# Patient Record
Sex: MALE | Race: WHITE | Employment: UNEMPLOYED | URBAN - METROPOLITAN AREA
[De-identification: names, ages, dates, MRNs, and addresses within clinical notes are randomized per-mention and may not be internally consistent; named-entity substitution may affect disease eponyms.]

---

## 2017-01-04 ENCOUNTER — TELEPHONE (OUTPATIENT)
Dept: NEUROLOGY | Age: 37
End: 2017-01-04

## 2017-01-04 DIAGNOSIS — G44.86 CERVICOGENIC HEADACHE: ICD-10-CM

## 2017-01-04 DIAGNOSIS — G43.709 CHRONIC MIGRAINE WITHOUT AURA WITHOUT STATUS MIGRAINOSUS, NOT INTRACTABLE: ICD-10-CM

## 2017-01-04 RX ORDER — BUTALBITAL, ACETAMINOPHEN AND CAFFEINE 50; 325; 40 MG/1; MG/1; MG/1
TABLET ORAL
Qty: 20 TAB | Refills: 2 | Status: SHIPPED | OUTPATIENT
Start: 2017-01-04 | End: 2017-06-01 | Stop reason: SDUPTHER

## 2017-01-04 NOTE — TELEPHONE ENCOUNTER
Dr. Justin Claros  rec'd request from [id number] [place] [address] [address] for a refill on Fioricet -40 mg table.  Please advise

## 2017-01-11 RX ORDER — SERTRALINE HYDROCHLORIDE 100 MG/1
100 TABLET, FILM COATED ORAL DAILY
Qty: 90 TAB | Refills: 3 | Status: SHIPPED | OUTPATIENT
Start: 2017-01-11 | End: 2018-01-30 | Stop reason: SDUPTHER

## 2017-01-11 NOTE — TELEPHONE ENCOUNTER
Chief Complaint   Patient presents with    Medication Refill     Last refill:  2 years ago (5/31/2014)        sertraline (ZOLOFT) 100 mg tablet       Take 100 mg by mouth daily.       Dispense: Not specified     By: Andrzej Dang MD

## 2017-01-11 NOTE — TELEPHONE ENCOUNTER
Mother of patient calling in refill for patient.  Patient can be reached at [phone]. [phone]

## 2017-02-12 ENCOUNTER — APPOINTMENT (OUTPATIENT)
Dept: GENERAL RADIOLOGY | Age: 37
End: 2017-02-12
Attending: PHYSICIAN ASSISTANT
Payer: COMMERCIAL

## 2017-02-12 ENCOUNTER — HOSPITAL ENCOUNTER (EMERGENCY)
Age: 37
Discharge: HOME | End: 2017-02-12
Attending: EMERGENCY MEDICINE
Payer: COMMERCIAL

## 2017-02-12 VITALS
TEMPERATURE: 97.9 F | DIASTOLIC BLOOD PRESSURE: 94 MMHG | RESPIRATION RATE: 16 BRPM | OXYGEN SATURATION: 98 % | BODY MASS INDEX: 37.45 KG/M2 | WEIGHT: 252.87 LBS | HEIGHT: 69 IN | HEART RATE: 104 BPM | SYSTOLIC BLOOD PRESSURE: 171 MMHG

## 2017-02-12 DIAGNOSIS — M79.601 ARM PAIN, ANTERIOR, RIGHT: Primary | ICD-10-CM

## 2017-02-12 DIAGNOSIS — M54.12 CERVICAL RADICULOPATHY: ICD-10-CM

## 2017-02-12 DIAGNOSIS — M50.30 DDD (DEGENERATIVE DISC DISEASE), CERVICAL: ICD-10-CM

## 2017-02-12 PROCEDURE — 74011250637 HC RX REV CODE- 250/637: Performed by: PHYSICIAN ASSISTANT

## 2017-02-12 PROCEDURE — 99283 EMERGENCY DEPT VISIT LOW MDM: CPT

## 2017-02-12 PROCEDURE — 73030 X-RAY EXAM OF SHOULDER: CPT

## 2017-02-12 RX ORDER — HYDROCODONE BITARTRATE AND ACETAMINOPHEN 5; 325 MG/1; MG/1
1 TABLET ORAL
Qty: 10 TAB | Refills: 0 | Status: SHIPPED | OUTPATIENT
Start: 2017-02-12 | End: 2017-03-08

## 2017-02-12 RX ORDER — HYDROCODONE BITARTRATE AND ACETAMINOPHEN 5; 325 MG/1; MG/1
1 TABLET ORAL
Status: COMPLETED | OUTPATIENT
Start: 2017-02-12 | End: 2017-02-12

## 2017-02-12 RX ORDER — DIAZEPAM 5 MG/1
5 TABLET ORAL
Status: COMPLETED | OUTPATIENT
Start: 2017-02-12 | End: 2017-02-12

## 2017-02-12 RX ORDER — DIAZEPAM 5 MG/1
5 TABLET ORAL
Qty: 10 TAB | Refills: 0 | Status: SHIPPED | OUTPATIENT
Start: 2017-02-12 | End: 2017-03-08

## 2017-02-12 RX ADMIN — DIAZEPAM 5 MG: 5 TABLET ORAL at 12:40

## 2017-02-12 RX ADMIN — HYDROCODONE BITARTRATE AND ACETAMINOPHEN 1 TABLET: 5; 325 TABLET ORAL at 12:39

## 2017-02-12 NOTE — LETTER
[address] 
[facility] EMERGENCY DEPT 
[address] [place] [address] [address] [id number] [phone] Work/School Note Date: 2/12/2017 To Whom It May concern: 
 
Jose Ly was seen and treated today in the emergency room by the following provider(s): 
Attending Provider: Tristan Herrera MD 
Physician Assistant: LOLLY Vargas. Jose Ly No work 48 hours. Sincerely, LOLLY Vargas

## 2017-02-12 NOTE — ED PROVIDER NOTES
HPI Comments: Francisca Mullins is a 39 y.o. male with pmhx significant for HTN, DM who presents ambulatory to the ED c/o sharp constant  right shoulder pain radiating into his RUE. Pt states movement exacerbates the pain. Per pt, he reports damage to his right rotator cuff as a child, but notes no follow-up or repair. He also notes numbness in the right hand. He specifically denies any neck pain or loss of bladder or stool control. PCP: Karie Spence MD    There are no other complaints, changes, or physical findings at this time. The history is provided by the patient. Past Medical History:   Diagnosis Date    Chronic headaches 2007    Depression     Diabetes ([address]     Hypertension     Thyroid disease      hypothyroid    Unspecified sleep apnea      does not use CPAP       Past Surgical History:   Procedure Laterality Date    Hx heent       wisdom teeth removed    Hx cholecystectomy  8/26/14     Dr Tonny Marrero    Hx orthopaedic       carpel tunnel         Family History:   Problem Relation Age of Onset    Diabetes Mother     Heart Disease Father     Cancer Father     Diabetes Father     Diabetes Paternal Aunt        Social History     Social History    Marital status:      Spouse name: N/A    Number of children: N/A    Years of education: N/A     Occupational History    Not on file. Social History Main Topics    Smoking status: Former Smoker     Packs/day: 0.00     Years: 14.00     Quit date: 1/1/2014    Smokeless tobacco: Never Used    Alcohol use No    Drug use: No    Sexual activity: Not on file     Other Topics Concern    Not on file     Social History Narrative         ALLERGIES: Demerol [meperidine]; Penicillin g; Percocet [oxycodone-acetaminophen]; and Sulfa (sulfonamide antibiotics)    Review of Systems   Constitutional: Negative for activity change, appetite change, chills and fever.    HENT: Negative for congestion, rhinorrhea, sinus pressure, sneezing and sore throat. Eyes: Negative for pain, discharge and visual disturbance. Respiratory: Negative for cough and shortness of breath. Cardiovascular: Negative for chest pain. Gastrointestinal: Negative for abdominal pain, diarrhea, nausea and vomiting. Genitourinary: Negative for dysuria, flank pain, frequency and urgency. Musculoskeletal: Positive for arthralgias (right shoulder). Negative for back pain, gait problem, joint swelling, myalgias and neck pain. Skin: Negative for color change and rash. Neurological: Positive for numbness (right hand). Negative for dizziness, speech difficulty, weakness, light-headedness and headaches. Psychiatric/Behavioral: Negative for agitation, behavioral problems and confusion. All other systems reviewed and are negative. Vitals:    02/12/17 1201   BP: (!) 171/94   Pulse: (!) 104   Resp: 16   Temp: 97.9 °F (36.6 °C)   SpO2: 98%   Weight: 114.7 kg (252 lb 13.9 oz)   Height: 5' 9\" (1.753 m)            Physical Exam   Constitutional: He is oriented to person, place, and time. He appears well-developed and well-nourished. No distress. HENT:   Head: Normocephalic and atraumatic. Right Ear: External ear normal.   Left Ear: External ear normal.   Nose: Nose normal.   Mouth/Throat: Oropharynx is clear and moist. No oropharyngeal exudate. Eyes: Conjunctivae and EOM are normal. Pupils are equal, round, and reactive to light. Right eye exhibits no discharge. Left eye exhibits no discharge. No scleral icterus. Neck: Normal range of motion. Neck supple. No JVD present. No tracheal deviation present. Cardiovascular: Normal rate, regular rhythm, normal heart sounds and intact distal pulses. Exam reveals no gallop and no friction rub. No murmur heard. Pulmonary/Chest: Effort normal and breath sounds normal. No respiratory distress. He has no wheezes. He has no rales. He exhibits no tenderness. Abdominal: Soft.  Bowel sounds are normal. He exhibits no distension and no mass. There is no tenderness. There is no rebound and no guarding. Musculoskeletal: Normal range of motion. He exhibits no edema or tenderness. Right trapezius, upper extremity, shoulder:  Tender  Decreased active passive  Neurovascularly intact   Lymphadenopathy:     He has no cervical adenopathy. Neurological: He is alert and oriented to person, place, and time. He has normal reflexes. No cranial nerve deficit. He exhibits normal muscle tone. Coordination normal.   Skin: Skin is warm and dry. He is not diaphoretic. Psychiatric: He has a normal mood and affect. His behavior is normal. Judgment and thought content normal.   Nursing note and vitals reviewed. MDM  Number of Diagnoses or Management Options  Diagnosis management comments: DDx: DDD, DJD, sciatica       Amount and/or Complexity of Data Reviewed  Tests in the radiology section of CPT®: ordered and reviewed  Review and summarize past medical records: yes    Patient Progress  Patient progress: stable    ED Course       Procedures     Progress Note  12:45 PM  Patient had a negative lumbar spine xray in 2015. Written by Graciela Hurd ED Scribe, as dictated by Vickey Herbert. LABORATORY TESTS:  No results found for this or any previous visit (from the past 12 hour(s)). IMAGING RESULTS:  XR SHOULDER RT AP/LAT MIN 2 V   Final Result          EXAM: XR SHOULDER RT AP/LAT MIN 2 V     INDICATION: Trauma. Right shoulder pain     COMPARISON: 9/24/2012.     FINDINGS: Three views of the right shoulder demonstrate no acute fracture or  dislocation.     IMPRESSION  IMPRESSION: No acute fracture. MEDICATIONS GIVEN:  Medications   HYDROcodone-acetaminophen (NORCO) 5-325 mg per tablet 1 Tab (1 Tab Oral Given 2/12/17 1239)   diazePAM (VALIUM) tablet 5 mg (5 mg Oral Given 2/12/17 1240)       IMPRESSION:  No diagnosis found. PLAN:  1.    Current Discharge Medication List      CONTINUE these medications which have NOT CHANGED Details   sertraline (ZOLOFT) 100 mg tablet Take 1 Tab by mouth daily. Qty: 90 Tab, Refills: 3      butalbital-acetaminophen-caffeine (FIORICET, ESGIC) -40 mg per tablet Take 1 tab at the onset of headache. Can take twice a day but not more than 10 days/month  Indications: only to be filled in one month intervals  Qty: 20 Tab, Refills: 2    Associated Diagnoses: Cervicogenic headache; Chronic migraine without aura without status migrainosus, not intractable      !! busPIRone (BUSPAR) 15 mg tablet Take 1 Tab by mouth two (2) times a day. Qty: 60 Tab, Refills: 3      insulin NPH (NOVOLIN N, HUMULIN N) 100 unit/mL injection 100 units twice daily  Qty: 1 Vial, Refills: 12    Associated Diagnoses: Uncontrolled type 1 diabetes mellitus without complication (HCC)      insulin regular (NOVOLIN R, HUMULIN R) 100 unit/mL injection 15-20 units TID AC  Qty: 1 Vial, Refills: 12    Associated Diagnoses: Uncontrolled type 1 diabetes mellitus without complication (HCC)      levothyroxine (SYNTHROID) 112 mcg tablet Take 1 Tab by mouth Daily (before breakfast). Qty: 90 Tab, Refills: 3    Associated Diagnoses: Acquired hypothyroidism      lisinopril (PRINIVIL, ZESTRIL) 10 mg tablet Take 1 Tab by mouth daily. Qty: 90 Tab, Refills: 3    Associated Diagnoses: Essential hypertension      topiramate (TOPAMAX) 25 mg tablet Wk 1 25 mg po qhs, wk 2 25 mg po bid, wk 3 25 mg am 50 mg qhs, wk 4 50 mg bid  Qty: 120 Tab, Refills: 2    Associated Diagnoses: Cervicogenic headache; Chronic migraine without aura without status migrainosus, not intractable      DULoxetine (CYMBALTA) 30 mg capsule Take 30 mg by mouth daily. diclofenac EC (VOLTAREN) 75 mg EC tablet Take 75 mg by mouth two (2) times a day. cyclobenzaprine (FLEXERIL) 5 mg tablet Take 10 mg by mouth three (3) times daily as needed for Muscle Spasm(s). !! busPIRone (BUSPAR) 5 mg tablet Take 5 mg by mouth.         HYDROcodone-acetaminophen (NORCO) 5-325 mg per tablet TK 1 T PO  Q 4 H PRN P  Refills: 0      gemfibrozil (LOPID) 600 mg tablet Take 1 Tab by mouth two (2) times a day. Qty: 180 Tab, Refills: 3    Associated Diagnoses: Hypertriglyceridemia       !! - Potential duplicate medications found. Please discuss with provider. 2.   Follow-up Information     None        3. Return to ED if worse     DISCHARGE NOTE  1:11 PM  The patient has been re-evaluated and is ready for discharge. Reviewed available results with patient. Counseled patient on diagnosis and care plan. Patient has expressed understanding, and all questions have been answered. Patient agrees with plan and agrees to follow up as recommended, or return to the ED if their symptoms worsen. Discharge instructions have been provided and explained to the patient, along with reasons to return to the ED. This note is prepared by Priya Mckinney, acting as Scribe for [employer]. MARGARITA Benson: The the scribe's documentation has been prepared under my direction and personally reviewed by me in its entirety. I confirm that the note above accurately reflects all work, treatment, procedures, and medical decision making performed by me.

## 2017-03-08 ENCOUNTER — HOSPITAL ENCOUNTER (EMERGENCY)
Age: 37
Discharge: HOME | End: 2017-03-08
Attending: EMERGENCY MEDICINE
Payer: COMMERCIAL

## 2017-03-08 VITALS
OXYGEN SATURATION: 98 % | HEIGHT: 69 IN | DIASTOLIC BLOOD PRESSURE: 86 MMHG | TEMPERATURE: 98.1 F | SYSTOLIC BLOOD PRESSURE: 131 MMHG | WEIGHT: 251.1 LBS | BODY MASS INDEX: 37.19 KG/M2 | HEART RATE: 95 BPM | RESPIRATION RATE: 18 BRPM

## 2017-03-08 DIAGNOSIS — G89.29 CHRONIC RIGHT SHOULDER PAIN: Primary | ICD-10-CM

## 2017-03-08 DIAGNOSIS — M43.6 ACUTE TORTICOLLIS: ICD-10-CM

## 2017-03-08 DIAGNOSIS — M25.511 CHRONIC RIGHT SHOULDER PAIN: Primary | ICD-10-CM

## 2017-03-08 PROCEDURE — 74011250637 HC RX REV CODE- 250/637: Performed by: PHYSICIAN ASSISTANT

## 2017-03-08 PROCEDURE — 99283 EMERGENCY DEPT VISIT LOW MDM: CPT

## 2017-03-08 RX ORDER — KETOROLAC TROMETHAMINE 10 MG/1
TABLET, FILM COATED ORAL
COMMUNITY
Start: 2017-02-16 | End: 2017-06-07

## 2017-03-08 RX ORDER — CYCLOBENZAPRINE HCL 10 MG
10 TABLET ORAL
Status: COMPLETED | OUTPATIENT
Start: 2017-03-08 | End: 2017-03-08

## 2017-03-08 RX ORDER — INSULIN LISPRO 100 [IU]/ML
INJECTION, SOLUTION INTRAVENOUS; SUBCUTANEOUS
COMMUNITY
Start: 2017-01-03 | End: 2018-03-20

## 2017-03-08 RX ORDER — HYDROCODONE BITARTRATE AND ACETAMINOPHEN 5; 325 MG/1; MG/1
1 TABLET ORAL
Qty: 20 TAB | Refills: 0 | Status: SHIPPED | OUTPATIENT
Start: 2017-03-08 | End: 2017-06-07

## 2017-03-08 RX ORDER — DULOXETIN HYDROCHLORIDE 60 MG/1
CAPSULE, DELAYED RELEASE ORAL
COMMUNITY
Start: 2017-03-06 | End: 2017-06-07

## 2017-03-08 RX ORDER — HYDROCODONE BITARTRATE AND ACETAMINOPHEN 5; 325 MG/1; MG/1
1 TABLET ORAL
Status: COMPLETED | OUTPATIENT
Start: 2017-03-08 | End: 2017-03-08

## 2017-03-08 RX ORDER — TRAMADOL HYDROCHLORIDE 50 MG/1
TABLET ORAL
COMMUNITY
Start: 2017-02-20 | End: 2017-03-08 | Stop reason: ALTCHOICE

## 2017-03-08 RX ORDER — CYCLOBENZAPRINE HCL 5 MG
5-10 TABLET ORAL
Qty: 20 TAB | Refills: 0 | Status: SHIPPED | OUTPATIENT
Start: 2017-03-08 | End: 2017-06-07

## 2017-03-08 RX ADMIN — CYCLOBENZAPRINE HYDROCHLORIDE 10 MG: 10 TABLET, FILM COATED ORAL at 21:42

## 2017-03-08 RX ADMIN — HYDROCODONE BITARTRATE AND ACETAMINOPHEN 1 TABLET: 5; 325 TABLET ORAL at 21:42

## 2017-03-09 NOTE — ED PROVIDER NOTES
HPI Comments: Norma Rocah is a 44 y.o. male with a history of hypertension, diabetes, hypothyroidism, depression, and cholecystectomy who presents ambulatory to [facility] ED with a chief complaint of neck and right shoulder pain for the past few weeks secondary to a known bulging disc and damaged rotator cuff for which patient is followed by orthopedics. Patient states he is currently on Tramadol, but reports it is not managing his symptoms. Patient reports he is followed by Dr. Damian Hurtado for his neck, and has an appointment with Dr. Blas Garcia for his shoulder on 3/26/17. Patient denies currently undergoing physical therapy. Patient denies any new injuries. Patient denies use of any antiinflammatories for his symptoms. Patient denies use of any tobacco products, alcohol, or illicit drugs. Patient denies any fevers or any other symptoms at this time. PCP: Naga Jordan MD  Orthopedics: Dr. Damian Hurtado, Dr. Blas Garcia    There are no other complaints, changes, or physical findings at this time. The history is provided by the patient. Past Medical History:   Diagnosis Date    Chronic headaches 2007    Depression     [address]     Hypertension     Thyroid disease     hypothyroid    Unspecified sleep apnea     does not use CPAP       Past Surgical History:   Procedure Laterality Date    HX CHOLECYSTECTOMY  8/26/14    Dr Leatha Peter    HX HEENT      wisdom teeth removed    HX ORTHOPAEDIC      carpel tunnel         Family History:   Problem Relation Age of Onset    Diabetes Mother     Heart Disease Father     Cancer Father     Diabetes Father     Diabetes Paternal Aunt        Social History     Social History    Marital status:      Spouse name: N/A    Number of children: N/A    Years of education: N/A     Occupational History    Not on file.      Social History Main Topics    Smoking status: Former Smoker     Packs/day: 0.00     Years: 14.00     Quit date: 1/1/2014    Smokeless tobacco: Never Used  Alcohol use No    Drug use: No    Sexual activity: Not on file     Other Topics Concern    Not on file     Social History Narrative     ALLERGIES: Demerol [meperidine]; Penicillin g; Percocet [oxycodone-acetaminophen]; and Sulfa (sulfonamide antibiotics)    Review of Systems   Constitutional: Negative. Negative for activity change, appetite change, chills and fever. HENT: Negative. Negative for congestion, rhinorrhea, sinus pressure, sneezing and sore throat. Eyes: Negative. Negative for pain, discharge and visual disturbance. Respiratory: Negative. Negative for cough and shortness of breath. Cardiovascular: Negative. Negative for chest pain. Gastrointestinal: Negative. Negative for abdominal pain, diarrhea, nausea and vomiting. Endocrine: Negative. Genitourinary: Negative. Negative for dysuria, flank pain, frequency and urgency. Musculoskeletal: Positive for arthralgias (right shoulder) and neck pain. Negative for back pain, gait problem, joint swelling and myalgias. Skin: Negative. Negative for color change and rash. Allergic/Immunologic: Negative. Neurological: Negative. Negative for dizziness, speech difficulty, weakness, light-headedness, numbness and headaches. Hematological: Negative. Psychiatric/Behavioral: Negative. Negative for agitation, behavioral problems and confusion. All other systems reviewed and are negative. Patient Vitals for the past 12 hrs:   Temp Pulse Resp BP SpO2   03/08/17 1932 98.1 °F (36.7 °C) (!) 102 18 131/86 98 %      Physical Exam   Constitutional: He is oriented to person, place, and time. He appears well-developed and well-nourished. No distress. Ambulatory   HENT:   Head: Normocephalic and atraumatic. Right Ear: External ear normal.   Left Ear: External ear normal.   Nose: Nose normal.   Mouth/Throat: Oropharynx is clear and moist. No oropharyngeal exudate.    Eyes: Conjunctivae and EOM are normal. Pupils are equal, round, and reactive to light. Right eye exhibits no discharge. Left eye exhibits no discharge. No scleral icterus. Neck: Normal range of motion. Neck supple. No tracheal deviation present. No contusion or deformity   Cardiovascular: Normal rate, regular rhythm, normal heart sounds and intact distal pulses. Exam reveals no gallop and no friction rub. No murmur heard. Pulmonary/Chest: Effort normal and breath sounds normal. No respiratory distress. He has no wheezes. He has no rales. He exhibits no tenderness. Abdominal: Soft. Bowel sounds are normal. He exhibits no distension and no mass. There is no tenderness. There is no rebound and no guarding. Musculoskeletal: He exhibits no edema. Tenderness to right rotator cuff, no contusion or deformity  Tightness to the right trapezius   Lymphadenopathy:     He has no cervical adenopathy. Neurological: He is alert and oriented to person, place, and time. No cranial nerve deficit. Coordination normal.   Neurovascularly intact to the right upper extremity   Skin: Skin is warm and dry. No rash noted. No erythema. Psychiatric: He has a normal mood and affect. His behavior is normal. Judgment and thought content normal.   Nursing note and vitals reviewed. MDM  Number of Diagnoses or Management Options  Diagnosis management comments: DDx: Shoulder pain, torticollis, DDD       Amount and/or Complexity of Data Reviewed  Review and summarize past medical records: yes    Patient Progress  Patient progress: stable      Procedures       MEDICATIONS GIVEN:  Medications   HYDROcodone-acetaminophen (NORCO) 5-325 mg per tablet 1 Tab (1 Tab Oral Given 3/8/17 2142)   cyclobenzaprine (FLEXERIL) tablet 10 mg (10 mg Oral Given 3/8/17 2142)       IMPRESSION:  1. Chronic right shoulder pain    2. Acute torticollis        PLAN:  1.    Current Discharge Medication List      START taking these medications    Details   HYDROcodone-acetaminophen (NORCO) 5-325 mg per tablet Take 1 Tab by mouth every six (6) hours as needed for Pain. Max Daily Amount: 4 Tabs. Qty: 20 Tab, Refills: 0         CONTINUE these medications which have CHANGED    Details   cyclobenzaprine (FLEXERIL) 5 mg tablet Take 1-2 Tabs by mouth three (3) times daily (with meals). Qty: 20 Tab, Refills: 0         CONTINUE these medications which have NOT CHANGED    Details   sertraline (ZOLOFT) 100 mg tablet Take 1 Tab by mouth daily. Qty: 90 Tab, Refills: 3      butalbital-acetaminophen-caffeine (FIORICET, ESGIC) -40 mg per tablet Take 1 tab at the onset of headache. Can take twice a day but not more than 10 days/month  Indications: only to be filled in one month intervals  Qty: 20 Tab, Refills: 2    Associated Diagnoses: Cervicogenic headache; Chronic migraine without aura without status migrainosus, not intractable      !! busPIRone (BUSPAR) 15 mg tablet Take 1 Tab by mouth two (2) times a day. Qty: 60 Tab, Refills: 3      insulin NPH (NOVOLIN N, HUMULIN N) 100 unit/mL injection 100 units twice daily  Qty: 1 Vial, Refills: 12    Associated Diagnoses: Uncontrolled type 1 diabetes mellitus without complication (HCC)      insulin regular (NOVOLIN R, HUMULIN R) 100 unit/mL injection 15-20 units TID AC  Qty: 1 Vial, Refills: 12    Associated Diagnoses: Uncontrolled type 1 diabetes mellitus without complication (HCC)      gemfibrozil (LOPID) 600 mg tablet Take 1 Tab by mouth two (2) times a day. Qty: 180 Tab, Refills: 3    Associated Diagnoses: Hypertriglyceridemia      levothyroxine (SYNTHROID) 112 mcg tablet Take 1 Tab by mouth Daily (before breakfast). Qty: 90 Tab, Refills: 3    Associated Diagnoses: Acquired hypothyroidism      lisinopril (PRINIVIL, ZESTRIL) 10 mg tablet Take 1 Tab by mouth daily.   Qty: 90 Tab, Refills: 3    Associated Diagnoses: Essential hypertension      topiramate (TOPAMAX) 25 mg tablet Wk 1 25 mg po qhs, wk 2 25 mg po bid, wk 3 25 mg am 50 mg qhs, wk 4 50 mg bid  Qty: 120 Tab, Refills: 2    Associated Diagnoses: Cervicogenic headache; Chronic migraine without aura without status migrainosus, not intractable      !! DULoxetine (CYMBALTA) 30 mg capsule Take 30 mg by mouth daily. diclofenac EC (VOLTAREN) 75 mg EC tablet Take 75 mg by mouth two (2) times a day. !! busPIRone (BUSPAR) 5 mg tablet Take 15 mg by mouth two (2) times a day.      ketorolac (TORADOL) 10 mg tablet       !! DULoxetine (CYMBALTA) 60 mg capsule       HUMALOG 100 unit/mL injection        !! - Potential duplicate medications found. Please discuss with provider. STOP taking these medications       traMADol (ULTRAM) 50 mg tablet Comments:   Reason for Stoppin.   Follow-up Information     Follow up With Details Comments [address] Pavan Florien, MD   [address] [facility] [address]  [address]  [address]  [address] [address]  [address] [place]   [address] [place] [address]  [phone]          Return to ED if worse     Discharge Note:  9:57 PM  The patient is ready for discharge. The patient's signs, symptoms, diagnosis, and discharge instructions have been discussed and the patient has conveyed their understanding. The patient is to follow up as recommended or return to the ER should their symptoms worsen. Plan has been discussed and the patient is in agreement. This note is prepared by Emmy Johnson, acting as Scribe for Hawk De León. MARGARITA Hernandez Search: The scribe's documentation has been prepared under my direction and personally reviewed by me in its entirety. I confirm that the note above accurately reflects all work, treatment, procedures, and medical decision making performed by me.

## 2017-03-09 NOTE — ED NOTES
Assumed care of pt from triage at this time. Pt arrives with c/o intermittent neck, R shoulder pain x  1 month. Pt was referred to Dr. Gaby Manzanares and had MRI of R neck-showed damaged rotator cuff and bulging disc between C5-C6. Pt with 8/10 posterior neck and R shoulder pain at this time. Pt resting comfortably on the stretcher in a position of comfort.  Pt in no acute distress at this time.  Call bell within reach.  Side rails x 2.   Stretcher locked in the lowest position.  Pt aware of plan to await for MD/PA-C/NP assessment, and pt/family verbalizes understanding.  Will continue to monitor neck, R shoulder pain.

## 2017-03-09 NOTE — ED NOTES
Discharge instructions given to patient by LOLLY Webster. Verbalized understanding of instructions. Patient discharged without difficulty. Patient discharged in stable condition via ambulation, pt denies wheelchair accompanied by family.

## 2017-04-20 ENCOUNTER — HOSPITAL ENCOUNTER (EMERGENCY)
Age: 37
Discharge: HOME | End: 2017-04-20
Attending: EMERGENCY MEDICINE
Payer: COMMERCIAL

## 2017-04-20 VITALS
HEIGHT: 69 IN | TEMPERATURE: 98.3 F | RESPIRATION RATE: 16 BRPM | WEIGHT: 254.19 LBS | DIASTOLIC BLOOD PRESSURE: 87 MMHG | SYSTOLIC BLOOD PRESSURE: 145 MMHG | OXYGEN SATURATION: 99 % | BODY MASS INDEX: 37.65 KG/M2 | HEART RATE: 104 BPM

## 2017-04-20 DIAGNOSIS — M25.511 CHRONIC RIGHT SHOULDER PAIN: Primary | ICD-10-CM

## 2017-04-20 DIAGNOSIS — G89.29 CHRONIC RIGHT SHOULDER PAIN: Primary | ICD-10-CM

## 2017-04-20 PROCEDURE — 99282 EMERGENCY DEPT VISIT SF MDM: CPT

## 2017-04-20 RX ORDER — NAPROXEN 500 MG/1
500 TABLET ORAL 2 TIMES DAILY WITH MEALS
Qty: 20 TAB | Refills: 0 | Status: SHIPPED | OUTPATIENT
Start: 2017-04-20 | End: 2017-04-30

## 2017-04-20 RX ORDER — HYDROCODONE BITARTRATE AND ACETAMINOPHEN 5; 325 MG/1; MG/1
1 TABLET ORAL
Qty: 15 TAB | Refills: 0 | Status: SHIPPED | OUTPATIENT
Start: 2017-04-20 | End: 2017-06-07

## 2017-04-20 RX ORDER — CYCLOBENZAPRINE HCL 10 MG
10 TABLET ORAL
Qty: 20 TAB | Refills: 0 | Status: SHIPPED | OUTPATIENT
Start: 2017-04-20 | End: 2017-06-07

## 2017-04-20 NOTE — ED NOTES
LOLLY Ridley provided patient with verbal and written discharge instructions. Patient discharged ambulatory.

## 2017-04-20 NOTE — DISCHARGE INSTRUCTIONS
Thank you for allowing us to provide you with care today. We hope we addressed all of your concerns and needs. We strive to provide excellent quality care in the Emergency Department. Please rate us as excellent, as anything less than excellent does not meet our expectations. If you feel that you have not received excellent quality care or timely care, please ask to speak to the nurse manager. Please choose us in the future for your continued health care needs. The exam and treatment you received in the Emergency Department were for an urgent problem and are not intended as complete care. It is important that you follow-up with a doctor, nurse practitioner, or  [id number] assistant to: (1) confirm your diagnosis, (2) re-evaluation of changes in your illness and treatment, and (3) for ongoing care. If your symptoms become worse or you do not improve as expected and you are unable to reach your usual health care provider, you should return to the Emergency Department. We are available 24 hours a day. Take this sheet with you when you go to your follow-up visit. If you have any problem arranging the follow-up visit, contact the Emergency Department immediately. Make an appointment with your Primary Care doctor for follow up of this visit. Return to the ER if you are unable to be seen in the time recommended on your discharge instructions.

## 2017-04-20 NOTE — ED PROVIDER NOTES
The history is provided by the patient. No  was used. Werner Pedraza is a 44 y.o. male who presents ambulatory  to [address] [facility] ED, with PMH of  HTN, DM, sleep apnea without C PAP, thyroid disease, chronic h/a, depression, and known hx right shoulder frozen shoulder and labrum tear and known hx C5C6 bulging disc, c/o 8/10 exacerbation of his right shoulder and neck pain x 3 days. Patient with long hx of right shoulder and neck pain, but for past x 3 days pain has been much more severe. Patient has decreased ROM of both neck and shoulder due to pain. Patient with ortho eval in past with hx epidural in neck that \"lasted only 4 days\" and hx shot in right shoulder \"that lasted only 4 days\". No improvement of his pain with previously prescribed tramadol by Ortho. Patient has appt with ortho on 5/1/17 and appt with ortho on 5/17/17. Patient denies specific known new injury/trauma/fall, fever/chills, URI s/s, cough, ABD pain, n/v, rashes/wounds or other specific c/o or concerns today. PCP: Toma Porter MD  Allergies: demerol, PCN, percocet, sulfa  Social Hx: former tobacco, no alcohol     There are no other complaints, changes or physical findings at this time. Past Medical History:   Diagnosis Date    Chronic headaches 2007    Depression     Diabetes ([facility] [address]     Hypertension     Thyroid disease     hypothyroid    Unspecified sleep apnea     does not use CPAP       Past Surgical History:   Procedure Laterality Date    HX CHOLECYSTECTOMY  8/26/14    Dr Kerwin Jacobs    HX HEENT      wisdom teeth removed    HX ORTHOPAEDIC      carpel tunnel         Family History:   Problem Relation Age of Onset    Diabetes Mother     Heart Disease Father     Cancer Father     Diabetes Father     Diabetes Paternal Aunt        Social History     Social History    Marital status:      Spouse name: N/A    Number of children: N/A    Years of education: N/A     Occupational History    Not on file. Social History Main Topics    Smoking status: Former Smoker     Packs/day: 0.00     Years: 14.00     Quit date: 1/1/2014    Smokeless tobacco: Never Used    Alcohol use No    Drug use: No    Sexual activity: Not on file     Other Topics Concern    Not on file     Social History Narrative         ALLERGIES: Demerol [meperidine]; Penicillin g; Percocet [oxycodone-acetaminophen]; and Sulfa (sulfonamide antibiotics)    Review of Systems   Constitutional: Negative for chills, fatigue and fever. HENT: Negative for congestion, ear pain, rhinorrhea and sore throat. Eyes: Negative for pain and redness. Respiratory: Negative for cough and wheezing. Cardiovascular: Negative for chest pain and palpitations. Gastrointestinal: Negative for abdominal pain, nausea and vomiting. Genitourinary: Negative for dysuria, frequency and urgency. Musculoskeletal: Negative for back pain and neck stiffness. Right shoulder pain   Skin: Negative for rash and wound. Neurological: Negative for dizziness, weakness, light-headedness, numbness and headaches. All other systems reviewed and are negative. Vitals:    04/20/17 1014   BP: 145/87   Pulse: (!) 104   Resp: 16   Temp: 98.3 °F (36.8 °C)   SpO2: 99%   Weight: 115.3 kg (254 lb 3.1 oz)   Height: 5' 9\" (1.753 m)            Physical Exam   Constitutional: He is oriented to person, place, and time. He appears well-developed and well-nourished. Non-toxic appearance. No distress. HENT:   Head: Normocephalic and atraumatic. Head is without right periorbital erythema and without left periorbital erythema. Right Ear: External ear normal.   Left Ear: External ear normal.   Nose: Nose normal.   Mouth/Throat: Uvula is midline. No trismus in the jaw. Eyes: Conjunctivae and EOM are normal. Pupils are equal, round, and reactive to light. No scleral icterus. Neck: Normal range of motion and full passive range of motion without pain.    Cardiovascular: Regular rhythm and normal heart sounds. Pulmonary/Chest: Effort normal and breath sounds normal. No accessory muscle usage. No tachypnea. No respiratory distress. He has no decreased breath sounds. He has no wheezes. Abdominal: Soft. There is no tenderness. There is no rigidity and no guarding. Musculoskeletal: Normal range of motion. Right shoulder: He exhibits tenderness. RIGHT SHOULDER:  Good symmetry  No bruising, redness or swelling  ROM limited secondary to pain  Diffuse tenderness   Neurological: He is alert and oriented to person, place, and time. He is not disoriented. No cranial nerve deficit or sensory deficit. GCS eye subscore is 4. GCS verbal subscore is 5. GCS motor subscore is 6. Skin: Skin is intact. No rash noted. Psychiatric: He has a normal mood and affect. His speech is normal.   Nursing note and vitals reviewed. MDM  Number of Diagnoses or Management Options  Chronic right shoulder pain:   Diagnosis management comments:      reviewed    Afebrile; well appearing; no new injury; presentation reflects a chronic, well documented complaint. Plan as below. Patient requests names of pain management MDs today while in ER. ED Course       Procedures     DISPOSITION:  11:27 AM    IMPRESSION:  1. Chronic right shoulder pain        PLAN:  1. Narcotic precautions reviewed with patient prior to discharge  2. Patient given pain management list handout at discharge  3. Take today's ER prescribed medications as directed  4. Follow up as instructed; keep your previously scheduled appts with ortho on 5/1/1/7 and 5/17/17. Discharge Medication List as of 4/20/2017 11:24 AM      START taking these medications    Details   !! HYDROcodone-acetaminophen (NORCO) 5-325 mg per tablet Take 1 Tab by mouth every four (4) hours as needed for Pain.  Max Daily Amount: 6 Tabs., Print, Disp-15 Tab, R-0      naproxen (NAPROSYN) 500 mg tablet Take 1 Tab by mouth two (2) times daily (with meals) for 10 days. , Print, Disp-20 Tab, R-0      !! cyclobenzaprine (FLEXERIL) 10 mg tablet Take 1 Tab by mouth three (3) times daily as needed for Muscle Spasm(s). , Print, Disp-20 Tab, R-0       !! - Potential duplicate medications found. Please discuss with provider. CONTINUE these medications which have NOT CHANGED    Details   ketorolac (TORADOL) 10 mg tablet Historical Med      !! DULoxetine (CYMBALTA) 60 mg capsule Historical Med      HUMALOG 100 unit/mL injection Historical Med, SANDRA      !! cyclobenzaprine (FLEXERIL) 5 mg tablet Take 1-2 Tabs by mouth three (3) times daily (with meals). , Normal, Disp-20 Tab, R-0      !! HYDROcodone-acetaminophen (NORCO) 5-325 mg per tablet Take 1 Tab by mouth every six (6) hours as needed for Pain. Max Daily Amount: 4 Tabs., Print, Disp-20 Tab, R-0      sertraline (ZOLOFT) 100 mg tablet Take 1 Tab by mouth daily. , Normal, Disp-90 Tab, R-3      butalbital-acetaminophen-caffeine (FIORICET, ESGIC) -40 mg per tablet Take 1 tab at the onset of headache. Can take twice a day but not more than 10 days/month  Indications: only to be filled in one month intervals, Print, Disp-20 Tab, R-2      !! busPIRone (BUSPAR) 15 mg tablet Take 1 Tab by mouth two (2) times a day., Normal, Disp-60 Tab, R-3      insulin NPH (NOVOLIN N, HUMULIN N) 100 unit/mL injection 100 units twice daily, No Print, Disp-1 Vial, R-12      insulin regular (NOVOLIN R, HUMULIN R) 100 unit/mL injection 15-20 units TID AC, No Print, Disp-1 Vial, R-12      gemfibrozil (LOPID) 600 mg tablet Take 1 Tab by mouth two (2) times a day., Normal, Disp-180 Tab, R-3      levothyroxine (SYNTHROID) 112 mcg tablet Take 1 Tab by mouth Daily (before breakfast). , Normal, Disp-90 Tab, R-3      lisinopril (PRINIVIL, ZESTRIL) 10 mg tablet Take 1 Tab by mouth daily. , Normal, Disp-90 Tab, R-3      topiramate (TOPAMAX) 25 mg tablet Wk 1 25 mg po qhs, wk 2 25 mg po bid, wk 3 25 mg am 50 mg qhs, wk 4 50 mg bid, Normal, Disp-120 Tab, R-2 !! DULoxetine (CYMBALTA) 30 mg capsule Take 30 mg by mouth daily. , Historical Med      diclofenac EC (VOLTAREN) 75 mg EC tablet Take 75 mg by mouth two (2) times a day., Historical Med      !! busPIRone (BUSPAR) 5 mg tablet Take 15 mg by mouth two (2) times a day., Historical Med       !! - Potential duplicate medications found. Please discuss with provider. Follow-up Information     Follow up With Details Comments Contact Info    Oksana Montejo MD Schedule an appointment as soon as possible for a visit PRIMARY CARE: call to schedule follow up [address]  [address]      Floyd Orta MD Schedule an appointment as soon as possible for a visit ORTHO: as needed if symptoms persist [address]  [phone]          Return to ED if worse     This note is prepared by Laura Matthew, acting as Scribe for MARGARITA Clifford PA-C : The scribe's documentation has been prepared under my direction and personally reviewed by me in its entirety. I confirm that the note above accurately reflects all work, treatment, procedures, and medical decision making performed by me.

## 2017-04-28 ENCOUNTER — DOCUMENTATION ONLY (OUTPATIENT)
Dept: NEUROLOGY | Age: 37
End: 2017-04-28

## 2017-06-01 DIAGNOSIS — G44.86 CERVICOGENIC HEADACHE: ICD-10-CM

## 2017-06-01 DIAGNOSIS — G43.709 CHRONIC MIGRAINE WITHOUT AURA WITHOUT STATUS MIGRAINOSUS, NOT INTRACTABLE: ICD-10-CM

## 2017-06-01 RX ORDER — BUTALBITAL, ACETAMINOPHEN AND CAFFEINE 50; 325; 40 MG/1; MG/1; MG/1
TABLET ORAL
Qty: 20 TAB | Refills: 0 | Status: SHIPPED | OUTPATIENT
Start: 2017-06-01 | End: 2017-06-07

## 2017-06-01 NOTE — TELEPHONE ENCOUNTER
No future appointments. Last Appointment My Department:  10/31/2016    Please advise of refill below. Requested Prescriptions     Pending Prescriptions Disp Refills    butalbital-acetaminophen-caffeine (FIORICET, ESGIC) -40 mg per tablet 20 Tab 0     Sig: Take 1 tab at the onset of headache. Can take twice a day but not more than 10 days/month.  PATIENT MUST MAKE APPOINTMENT FOR FURTHER REFILLS  Indications: only to be filled in one month intervals

## 2017-06-07 ENCOUNTER — APPOINTMENT (OUTPATIENT)
Dept: GENERAL RADIOLOGY | Age: 37
End: 2017-06-07
Attending: PHYSICIAN ASSISTANT
Payer: COMMERCIAL

## 2017-06-07 ENCOUNTER — HOSPITAL ENCOUNTER (EMERGENCY)
Age: 37
Discharge: HOME | End: 2017-06-07
Attending: EMERGENCY MEDICINE | Admitting: EMERGENCY MEDICINE
Payer: COMMERCIAL

## 2017-06-07 VITALS
SYSTOLIC BLOOD PRESSURE: 141 MMHG | BODY MASS INDEX: 37.49 KG/M2 | OXYGEN SATURATION: 100 % | WEIGHT: 253.09 LBS | HEART RATE: 109 BPM | RESPIRATION RATE: 11 BRPM | DIASTOLIC BLOOD PRESSURE: 100 MMHG | HEIGHT: 69 IN | TEMPERATURE: 98.1 F

## 2017-06-07 DIAGNOSIS — G43.709 CHRONIC MIGRAINE WITHOUT AURA WITHOUT STATUS MIGRAINOSUS, NOT INTRACTABLE: ICD-10-CM

## 2017-06-07 DIAGNOSIS — M54.5 CHRONIC LOW BACK PAIN, UNSPECIFIED BACK PAIN LATERALITY, WITH SCIATICA PRESENCE UNSPECIFIED: ICD-10-CM

## 2017-06-07 DIAGNOSIS — G44.86 CERVICOGENIC HEADACHE: Primary | ICD-10-CM

## 2017-06-07 DIAGNOSIS — G89.29 CHRONIC LOW BACK PAIN, UNSPECIFIED BACK PAIN LATERALITY, WITH SCIATICA PRESENCE UNSPECIFIED: ICD-10-CM

## 2017-06-07 PROCEDURE — 74011250637 HC RX REV CODE- 250/637: Performed by: EMERGENCY MEDICINE

## 2017-06-07 PROCEDURE — 72100 X-RAY EXAM L-S SPINE 2/3 VWS: CPT

## 2017-06-07 PROCEDURE — 74011636637 HC RX REV CODE- 636/637: Performed by: EMERGENCY MEDICINE

## 2017-06-07 PROCEDURE — 99283 EMERGENCY DEPT VISIT LOW MDM: CPT

## 2017-06-07 RX ORDER — DIAZEPAM 5 MG/1
5 TABLET ORAL
Status: COMPLETED | OUTPATIENT
Start: 2017-06-07 | End: 2017-06-07

## 2017-06-07 RX ORDER — PREDNISONE 20 MG/1
60 TABLET ORAL
Status: DISCONTINUED | OUTPATIENT
Start: 2017-06-07 | End: 2017-06-07 | Stop reason: CLARIF

## 2017-06-07 RX ORDER — PREDNISONE 20 MG/1
60 TABLET ORAL
Status: COMPLETED | OUTPATIENT
Start: 2017-06-07 | End: 2017-06-07

## 2017-06-07 RX ORDER — BUTALBITAL, ACETAMINOPHEN AND CAFFEINE 50; 325; 40 MG/1; MG/1; MG/1
1 TABLET ORAL
Status: COMPLETED | OUTPATIENT
Start: 2017-06-07 | End: 2017-06-07

## 2017-06-07 RX ORDER — BUTALBITAL, ACETAMINOPHEN AND CAFFEINE 50; 325; 40 MG/1; MG/1; MG/1
1 TABLET ORAL
Qty: 15 TAB | Refills: 0 | Status: SHIPPED | OUTPATIENT
Start: 2017-06-07 | End: 2017-07-18 | Stop reason: SDUPTHER

## 2017-06-07 RX ORDER — DIAZEPAM 5 MG/1
5 TABLET ORAL
Qty: 15 TAB | Refills: 0 | Status: SHIPPED | OUTPATIENT
Start: 2017-06-07 | End: 2017-10-01

## 2017-06-07 RX ADMIN — DIAZEPAM 5 MG: 5 TABLET ORAL at 20:01

## 2017-06-07 RX ADMIN — BUTALBITAL, ACETAMINOPHEN AND CAFFEINE 1 TABLET: 50; 325; 40 TABLET ORAL at 20:01

## 2017-06-07 RX ADMIN — PREDNISONE 60 MG: 20 TABLET ORAL at 20:02

## 2017-06-07 NOTE — DISCHARGE INSTRUCTIONS
Back Pain: Care Instructions  Your Care Instructions    Back pain has many possible causes. It is often related to problems with muscles and ligaments of the back. It may also be related to problems with the nerves, discs, or bones of the back. Moving, lifting, standing, sitting, or sleeping in an awkward way can strain the back. Sometimes you don't notice the injury until later. Arthritis is another common cause of back pain. Although it may hurt a lot, back pain usually improves on its own within several weeks. Most people recover in 12 weeks or less. Using good home treatment and being careful not to stress your back can help you feel better sooner. Follow-up care is a key part of your treatment and safety. Be sure to make and go to all appointments, and call your doctor if you are having problems. Its also a good idea to know your test results and keep a list of the medicines you take. How can you care for yourself at home? · Sit or lie in positions that are most comfortable and reduce your pain. Try one of these positions when you lie down:  ¨ Lie on your back with your knees bent and supported by large pillows. ¨ Lie on the floor with your legs on the seat of a sofa or chair. Renelle Borne on your side with your knees and hips bent and a pillow between your legs. ¨ Lie on your stomach if it does not make pain worse. · Do not sit up in bed, and avoid soft couches and twisted positions. Bed rest can help relieve pain at first, but it delays healing. Avoid bed rest after the first day of back pain. · Change positions every 30 minutes. If you must sit for long periods of time, take breaks from sitting. Get up and walk around, or lie in a comfortable position. · Try using a heating pad on a low or medium setting for 15 to 20 minutes every 2 or 3 hours. Try a warm shower in place of one session with the heating pad. · You can also try an ice pack for 10 to 15 minutes every 2 to 3 hours.  Put a thin cloth between the ice pack and your skin. · Take pain medicines exactly as directed. ¨ If the doctor gave you a prescription medicine for pain, take it as prescribed. ¨ If you are not taking a prescription pain medicine, ask your doctor if you can take an over-the-counter medicine. · Take short walks several times a day. You can start with 5 to 10 minutes, 3 or 4 times a day, and work up to longer walks. Walk on level surfaces and avoid hills and stairs until your back is better. · Return to work and other activities as soon as you can. Continued rest without activity is usually not good for your back. · To prevent future back pain, do exercises to stretch and strengthen your back and stomach. Learn how to use good posture, safe lifting techniques, and proper body mechanics. When should you call for help? Call your doctor now or seek immediate medical care if:  · You have new or worsening numbness in your legs. · You have new or worsening weakness in your legs. (This could make it hard to stand up.)  · You lose control of your bladder or bowels. Watch closely for changes in your health, and be sure to contact your doctor if:  · Your pain gets worse. · You are not getting better after 2 weeks. Where can you learn more? Go to http://ladarius-pavel.info/. Enter B843 in the search box to learn more about \"Back Pain: Care Instructions. \"  Current as of: May 23, 2016  Content Version: 11.2  © 5317-6189 [email or web address]. Care instructions adapted under license by [employer] (which disclaims liability or warranty for this information). If you have questions about a medical condition or this instruction, always ask your healthcare professional. [place] [address] any warranty or liability for your use of this information. Headache: Care Instructions  Your Care Instructions    Headaches have many possible causes.  Most headaches aren't a sign of a more serious problem, and they will get better on their own. Home treatment may help you feel better faster. The doctor has checked you carefully, but problems can develop later. If you notice any problems or new symptoms, get medical treatment right away. Follow-up care is a key part of your treatment and safety. Be sure to make and go to all appointments, and call your doctor if you are having problems. It's also a good idea to know your test results and keep a list of the medicines you take. How can you care for yourself at home? · Do not drive if you have taken a prescription pain medicine. · Rest in a quiet, dark room until your headache is gone. Close your eyes and try to relax or go to sleep. Don't watch TV or read. · Put a cold, moist cloth or cold pack on the painful area for 10 to 20 minutes at a time. Put a thin cloth between the cold pack and your skin. · Use a warm, moist towel or a heating pad set on low to relax tight shoulder and neck muscles. · Have someone gently massage your neck and shoulders. · Take pain medicines exactly as directed. ¨ If the doctor gave you a prescription medicine for pain, take it as prescribed. ¨ If you are not taking a prescription pain medicine, ask your doctor if you can take an over-the-counter medicine. · Be careful not to take pain medicine more often than the instructions allow, because you may get worse or more frequent headaches when the medicine wears off. · Do not ignore new symptoms that occur with a headache, such as a fever, weakness or numbness, vision changes, or confusion. These may be signs of a more serious problem. To prevent headaches  · Keep a headache diary so you can figure out what triggers your headaches. Avoiding triggers may help you prevent headaches. Record when each headache began, how long it lasted, and what the pain was like (throbbing, aching, stabbing, or dull).  Write down any other symptoms you had with the headache, such as nausea, flashing lights or dark spots, or sensitivity to bright light or loud noise. Note if the headache occurred near your period. List anything that might have triggered the headache, such as certain foods (chocolate, cheese, wine) or odors, smoke, bright light, stress, or lack of sleep. · Find healthy ways to deal with stress. Headaches are most common during or right after stressful times. Take time to relax before and after you do something that has caused a headache in the past.  · Try to keep your muscles relaxed by keeping good posture. Check your jaw, face, neck, and shoulder muscles for tension, and try relaxing them. When sitting at a desk, change positions often, and stretch for 30 seconds each hour. · Get plenty of sleep and exercise. · Eat regularly and well. Long periods without food can trigger a headache. · Treat yourself to a massage. Some people find that regular massages are very helpful in relieving tension. · Limit caffeine by not drinking too much coffee, tea, or soda. But don't quit caffeine suddenly, because that can also give you headaches. · Reduce eyestrain from computers by blinking frequently and looking away from the computer screen every so often. Make sure you have proper eyewear and that your monitor is set up properly, about an arm's length away. · Seek help if you have depression or anxiety. Your headaches may be linked to these conditions. Treatment can both prevent headaches and help with symptoms of anxiety or depression. When should you call for help? Call 911 anytime you think you may need emergency care. For example, call if:  · You have signs of a stroke. These may include:  ¨ Sudden numbness, paralysis, or weakness in your face, arm, or leg, especially on only one side of your body. ¨ Sudden vision changes. ¨ Sudden trouble speaking. ¨ Sudden confusion or trouble understanding simple statements. ¨ Sudden problems with walking or balance.   ¨ A sudden, severe headache that is different from past headaches. Call your doctor now or seek immediate medical care if:  · You have a new or worse headache. · Your headache gets much worse. Where can you learn more? Go to http://ladarius-pavel.info/. Enter M271 in the search box to learn more about \"Headache: Care Instructions. \"  Current as of: 2016  Content Version: 11.2  © 0897-0931 [employer]. Care instructions adapted under license by [employer] (which disclaims liability or warranty for this information). If you have questions about a medical condition or this instruction, always ask your healthcare professional. [address] any warranty or liability for your use of this information. [employer] Activation    Thank you for requesting access to [employer]. Please follow the instructions below to securely access and download your online medical record. [employer] allows you to send messages to your doctor, view your test results, renew your prescriptions, schedule appointments, and more. How Do I Sign Up? 1. In your internet browser, go to [email or web address]  2. Click on the First Time User? Click Here link in the Sign In box. You will be redirect to the New Member Sign Up page. 3. Enter your [employer] Access Code exactly as it appears below. You will not need to use this code after youve completed the sign-up process. If you do not sign up before the expiration date, you must request a new code. [employer] Access Code: [email or web address]  Expires: 2017  6:53 PM (This is the date your [employer] access code will )    4. Enter the last four digits of your Social Security Number (xxxx) and Date of Birth (mm/dd/yyyy) as indicated and click Submit. You will be taken to the next sign-up page. 5. Create a [employer] ID. This will be your [employer] login ID and cannot be changed, so think of one that is secure and easy to remember.   6. Create a [employer] password. You can change your password at any time. 7. Enter your Password Reset Question and Answer. This can be used at a later time if you forget your password. 8. Enter your e-mail address. You will receive e-mail notification when new information is available in [address] 9. Click Sign Up. You can now view and download portions of your medical record. 10. Click the Download Summary menu link to download a portable copy of your medical information. Additional Information    If you have questions, please visit the Frequently Asked Questions section of the [employer] website at [email or web address] Remember, [employer] is NOT to be used for urgent needs. For medical emergencies, dial 911.

## 2017-06-07 NOTE — ED NOTES
Pt to ED for lower back pain x3mo and HA x4. Pt reports a hx of migraines, states this feels similar. Radiates from neck up to anterior head. Pt AOx4, reports photophobia.

## 2017-07-18 ENCOUNTER — TELEPHONE (OUTPATIENT)
Dept: NEUROLOGY | Age: 37
End: 2017-07-18

## 2017-07-18 DIAGNOSIS — G43.009 MIGRAINE WITHOUT AURA AND WITHOUT STATUS MIGRAINOSUS, NOT INTRACTABLE: Primary | ICD-10-CM

## 2017-07-18 RX ORDER — BUTALBITAL, ACETAMINOPHEN AND CAFFEINE 50; 325; 40 MG/1; MG/1; MG/1
1 TABLET ORAL
Qty: 15 TAB | Refills: 0 | Status: SHIPPED | OUTPATIENT
Start: 2017-07-18 | End: 2017-08-11 | Stop reason: SDUPTHER

## 2017-07-24 ENCOUNTER — DOCUMENTATION ONLY (OUTPATIENT)
Dept: NEUROLOGY | Age: 37
End: 2017-07-24

## 2017-07-24 NOTE — PROGRESS NOTES
Faxed on 7/18/2017 to [facility] prescription for Butalbital-acetaminophen-caffeine -40 mg per tablet.

## 2017-08-11 ENCOUNTER — TELEPHONE (OUTPATIENT)
Dept: NEUROLOGY | Age: 37
End: 2017-08-11

## 2017-08-11 DIAGNOSIS — G43.009 MIGRAINE WITHOUT AURA AND WITHOUT STATUS MIGRAINOSUS, NOT INTRACTABLE: ICD-10-CM

## 2017-08-11 RX ORDER — BUTALBITAL, ACETAMINOPHEN AND CAFFEINE 50; 325; 40 MG/1; MG/1; MG/1
1 TABLET ORAL
Qty: 15 TAB | Refills: 0 | Status: SHIPPED | OUTPATIENT
Start: 2017-08-11 | End: 2017-09-07 | Stop reason: SDUPTHER

## 2017-08-15 ENCOUNTER — DOCUMENTATION ONLY (OUTPATIENT)
Dept: NEUROLOGY | Age: 37
End: 2017-08-15

## 2017-08-15 NOTE — PROGRESS NOTES
Faxed on 8/11/2017 to [address] prescription for Butalbital-acetaminophen-caffeine -40 mg at [phone].

## 2017-09-07 DIAGNOSIS — G43.009 MIGRAINE WITHOUT AURA AND WITHOUT STATUS MIGRAINOSUS, NOT INTRACTABLE: ICD-10-CM

## 2017-09-07 RX ORDER — BUTALBITAL, ACETAMINOPHEN AND CAFFEINE 50; 325; 40 MG/1; MG/1; MG/1
1 TABLET ORAL
Qty: 15 TAB | Refills: 0 | Status: SHIPPED | OUTPATIENT
Start: 2017-09-07 | End: 2017-10-14 | Stop reason: SDUPTHER

## 2017-09-07 NOTE — TELEPHONE ENCOUNTER
No future appointments. Last Appointment My Department:  10/31/2016    Please advise of refill below. Can you please sign off on for Dr Gregg Sarabia as he is out of the office    Requested Prescriptions     Pending Prescriptions Disp Refills    butalbital-acetaminophen-caffeine (ESGIC) -40 mg per tablet 15 Tab 0     Sig: Take 1 Tab by mouth every six (6) hours as needed for Pain. Max Daily Amount: 4 Tabs.  PLEASE HAVE PATIENT CALL FOR APPOINTMENT FOR FURTHER FILLS

## 2017-10-01 ENCOUNTER — HOSPITAL ENCOUNTER (EMERGENCY)
Age: 37
Discharge: HOME | End: 2017-10-01
Attending: EMERGENCY MEDICINE
Payer: COMMERCIAL

## 2017-10-01 VITALS
DIASTOLIC BLOOD PRESSURE: 85 MMHG | HEIGHT: 69 IN | BODY MASS INDEX: 38.11 KG/M2 | OXYGEN SATURATION: 100 % | WEIGHT: 257.28 LBS | HEART RATE: 101 BPM | RESPIRATION RATE: 18 BRPM | TEMPERATURE: 97.7 F | SYSTOLIC BLOOD PRESSURE: 165 MMHG

## 2017-10-01 DIAGNOSIS — M50.30 DEGENERATIVE DISC DISEASE, CERVICAL: Primary | ICD-10-CM

## 2017-10-01 DIAGNOSIS — M43.6 ACUTE TORTICOLLIS: ICD-10-CM

## 2017-10-01 PROCEDURE — 74011250637 HC RX REV CODE- 250/637: Performed by: PHYSICIAN ASSISTANT

## 2017-10-01 PROCEDURE — 99283 EMERGENCY DEPT VISIT LOW MDM: CPT

## 2017-10-01 RX ORDER — CYCLOBENZAPRINE HCL 10 MG
10 TABLET ORAL
Qty: 20 TAB | Refills: 0 | Status: SHIPPED | OUTPATIENT
Start: 2017-10-01 | End: 2019-06-17

## 2017-10-01 RX ORDER — HYDROCODONE BITARTRATE AND ACETAMINOPHEN 5; 325 MG/1; MG/1
1 TABLET ORAL
Status: COMPLETED | OUTPATIENT
Start: 2017-10-01 | End: 2017-10-01

## 2017-10-01 RX ORDER — NAPROXEN 500 MG/1
500 TABLET ORAL
Qty: 20 TAB | Refills: 0 | Status: SHIPPED | OUTPATIENT
Start: 2017-10-01 | End: 2018-03-20

## 2017-10-01 RX ORDER — CYCLOBENZAPRINE HCL 10 MG
10 TABLET ORAL
Status: COMPLETED | OUTPATIENT
Start: 2017-10-01 | End: 2017-10-01

## 2017-10-01 RX ORDER — NAPROXEN 250 MG/1
500 TABLET ORAL
Status: COMPLETED | OUTPATIENT
Start: 2017-10-01 | End: 2017-10-01

## 2017-10-01 RX ORDER — HYDROCODONE BITARTRATE AND ACETAMINOPHEN 5; 325 MG/1; MG/1
1-2 TABLET ORAL
Qty: 20 TAB | Refills: 0 | Status: SHIPPED | OUTPATIENT
Start: 2017-10-01 | End: 2018-03-20

## 2017-10-01 RX ORDER — CYCLOBENZAPRINE HCL 10 MG
10 TABLET ORAL
COMMUNITY
End: 2017-10-01

## 2017-10-01 RX ADMIN — CYCLOBENZAPRINE HYDROCHLORIDE 10 MG: 10 TABLET, FILM COATED ORAL at 16:22

## 2017-10-01 RX ADMIN — NAPROXEN 500 MG: 250 TABLET ORAL at 16:22

## 2017-10-01 RX ADMIN — HYDROCODONE BITARTRATE AND ACETAMINOPHEN 1 TABLET: 5; 325 TABLET ORAL at 16:22

## 2017-10-01 NOTE — ED NOTES
Pt ambulatory to discharge and accompanied by mother. Discharge papers given and explained by provider. Pt verbalized understanding.

## 2017-10-01 NOTE — LETTER
[address] 
[facility] EMERGENCY DEPT 
[address] [id number] [phone] Work/School Note Date: 10/1/2017 To Whom It May concern: 
 
Miguelina Ugarte was seen and treated today in the emergency room by the following provider(s): 
Attending Provider: Modesto Casas MD 
Physician Assistant: LOLLY Goldberg. Miguelina Ugarte may return to work on 10/4/17 or sooner, if feeling better. Sincerely, Lou Davis, PA

## 2017-10-01 NOTE — ED PROVIDER NOTES
[address]  EMERGENCY DEPARTMENT HISTORY AND PHYSICAL EXAM       Date of Service: 10/1/2017   Patient Name: Daniel Lepe   YOB: 1980  Medical Record Number: [id number]    History of Presenting Illness     Chief Complaint   Patient presents with    Neck Pain     ambulatory into triage with steady gait; pt complains of neck pain stating \"I have a bulging disc thats been acting up\" x 3 days; pt reports no relief at home with Tylenol; pt denies trauma, accident, or fall        History Provided By:  patient    Additional History:   Daniel Lepe is a 44 y.o. male with PMhx significant for bulging cervical disc, DM, chronic headaches, thyroid disease who presents ambulatory to the ED with cc of exacerbation in chronic posterior neck pain x 3 days. Pt reports radiating pain up into his head and down his RUE and describes his pain as aching. Exacerbating factors include turning his head. He states his symptoms began with a HA and progressed into neck pain. Pt reports taking Tylenol for his neck pain with no relief; however, he notes improvement in his HA with prescribed Fioricet. Pt is followed by Dr. Irene Smith and states he is in the process of scheduling outpatient PT and dry needling. He has had epidural injections in the past. Pt specifically denies any recent falls, injuries, or twists. He denies a hx of kidney or liver disease. Pt specifically denies any recent fevers or urinary/fecal incontinence. Social Hx: - Tobacco, - EtOH, - Illicit Drugs    There are no other complaints, changes or physical findings at this time.     Primary Care Provider: Cristiane Moran MD   Ortho: Dr. Irene Smith    Past History     Past Medical History:   Past Medical History:   Diagnosis Date    Chronic headaches 2007    Depression     Diabetes ([facility] [address]     Hypertension     Thyroid disease     hypothyroid    Unspecified sleep apnea     does not use CPAP        Past Surgical History:   Past Surgical History:   Procedure Laterality Date    HX CHOLECYSTECTOMY  8/26/14    Dr Tonny Marrero    HX HEENT      wisdom teeth removed    HX ORTHOPAEDIC      carpel tunnel        Family History:   Family History   Problem Relation Age of Onset    Diabetes Mother     Heart Disease Father     Cancer Father     Diabetes Father     Diabetes Paternal Aunt         Social History:   Social History   Substance Use Topics    Smoking status: Former Smoker     Packs/day: 0.00     Years: 14.00     Quit date: 1/1/2014    Smokeless tobacco: Never Used    Alcohol use No        Allergies: Allergies   Allergen Reactions    Demerol [Meperidine] Hives    Penicillin G Swelling    Percocet [Oxycodone-Acetaminophen] Hives and Nausea and Vomiting    Sulfa (Sulfonamide Antibiotics) Swelling        Review of Systems   Review of Systems   Constitutional: Negative. Negative for fever. HENT: Negative. Eyes: Negative. Respiratory: Negative. Cardiovascular: Negative. Gastrointestinal: Negative. Negative for constipation, diarrhea, nausea and vomiting. Denies liver disease   Endocrine:        Endorses thyroid disease   Genitourinary: Negative. Negative for dysuria. Denies kidney disease   Musculoskeletal: Positive for myalgias (RUE) and neck pain. Skin: Negative. Neurological: Negative. -urinary/fecal incontinence    All other systems reviewed and are negative. Physical Exam  Physical Exam   Constitutional: He is oriented to person, place, and time. He appears well-developed and well-nourished. No distress. HENT:   Head: Normocephalic and atraumatic. Right Ear: External ear normal.   Left Ear: External ear normal.   Nose: Nose normal.   Mouth/Throat: Oropharynx is clear and moist. No oropharyngeal exudate. Eyes: Conjunctivae and EOM are normal. Pupils are equal, round, and reactive to light. Right eye exhibits no discharge. Left eye exhibits no discharge. No scleral icterus. Neck: Normal range of motion. No tracheal deviation present. No step offs to cervical spine. Spasm and tenderness R trapezius and rhomboid muscle. Cardiovascular: Normal rate, regular rhythm, normal heart sounds and intact distal pulses. Exam reveals no gallop and no friction rub. No murmur heard. Pulmonary/Chest: Effort normal and breath sounds normal. No respiratory distress. He has no wheezes. He has no rales. He exhibits no tenderness. Musculoskeletal: He exhibits no edema. No contusion or deformity to spine. Pt ambulatory. Lymphadenopathy:     He has no cervical adenopathy. Neurological: He is alert and oriented to person, place, and time. No cranial nerve deficit. Coordination normal.   NVI BL upper extremities. Strength equal BL upper extremities. Skin: Skin is warm and dry. No rash noted. No erythema. Psychiatric: He has a normal mood and affect. His behavior is normal. Judgment and thought content normal.   Nursing note and vitals reviewed. Medical Decision Making   I am the first provider for this patient. I reviewed the vital signs, available nursing notes, past medical history, past surgical history, family history and social history. Provider Notes:   DDx: DDD, torticollis, spasm      ED Course:  4:00 PM   Initial assessment performed. The patients presenting problems have been discussed, and they are in agreement with the care plan formulated and outlined with them. I have encouraged them to ask questions as they arise throughout their visit. PROGRESS NOTE  4:05 PM   Pt requesting his scripts be printed since it is Sunday. Vital Signs-Reviewed the patient's vital signs.    Patient Vitals for the past 12 hrs:   Temp Pulse Resp BP SpO2   10/01/17 1556 97.7 °F (36.5 °C) (!) 101 18 165/85 100 %       Medications Given in the ED:  Medications   naproxen (NAPROSYN) tablet 500 mg (500 mg Oral Given 10/1/17 1622)   HYDROcodone-acetaminophen (NORCO) 5-325 mg per tablet 1 Tab (1 Tab Oral Given 10/1/17 1622)   cyclobenzaprine (FLEXERIL) tablet 10 mg (10 mg Oral Given 10/1/17 1622)       Diagnosis:  Clinical Impression:   1. Degenerative disc disease, cervical    2. Acute torticollis         Plan:  1:   Follow-up Information     Follow up With Details Comments 81 Afia Motta MD   [address] [place] [address]  [address] [facility]  [address]  [phone]      Sharyn Sunshine MD Schedule an appointment as soon as possible for a visit  [address] [place] [address] 100  [facility]  [phone]      [facility] EMERGENCY DEPT  If symptoms worsen [address] [place] [address]  [phone]          2:   Discharge Medication List as of 10/1/2017  4:41 PM      START taking these medications    Details   HYDROcodone-acetaminophen (NORCO) 5-325 mg per tablet Take 1-2 Tabs by mouth every six (6) hours as needed for Pain. Max Daily Amount: 8 Tabs., Print, Disp-20 Tab, R-0      naproxen (NAPROSYN) 500 mg tablet Take 1 Tab by mouth every twelve (12) hours as needed for Pain., Print, Disp-20 Tab, R-0         CONTINUE these medications which have CHANGED    Details   cyclobenzaprine (FLEXERIL) 10 mg tablet Take 1 Tab by mouth three (3) times daily (with meals). Indications: MUSCLE SPASM, Print, Disp-20 Tab, R-0         CONTINUE these medications which have NOT CHANGED    Details   butalbital-acetaminophen-caffeine (ESGIC) -40 mg per tablet Take 1 Tab by mouth every six (6) hours as needed for Pain. Max Daily Amount: 4 Tabs. PLEASE HAVE PATIENT CALL FOR APPOINTMENT FOR FURTHER FILLS, Print, Disp-15 Tab, R-0      HUMALOG 100 unit/mL injection Historical Med, SANDRA      sertraline (ZOLOFT) 100 mg tablet Take 1 Tab by mouth daily. , Normal, Disp-90 Tab, R-3      busPIRone (BUSPAR) 15 mg tablet Take 1 Tab by mouth two (2) times a day., Normal, Disp-60 Tab, R-3      insulin NPH (NOVOLIN N, HUMULIN N) 100 unit/mL injection 100 units twice daily, No Print, Disp-1 Vial, R-12 insulin regular (NOVOLIN R, HUMULIN R) 100 unit/mL injection 15-20 units TID AC, No Print, Disp-1 Vial, R-12      levothyroxine (SYNTHROID) 112 mcg tablet Take 1 Tab by mouth Daily (before breakfast). , Normal, Disp-90 Tab, R-3      lisinopril (PRINIVIL, ZESTRIL) 10 mg tablet Take 1 Tab by mouth daily. , Normal, Disp-90 Tab, R-3           Return to ED if worse. Discharge Note:  4:15 PM  The patient has been re-evaluated and is ready for discharge. Reviewed available results with patient. Counseled patient on diagnosis and care plan. Patient has expressed understanding, and all questions have been answered. Patient agrees with plan and agrees to follow up as recommended, or to return to the ED if their symptoms worsen. Discharge instructions have been provided and explained to the patient, along with reasons to return to the ED.  _______________________________   Attestations: This note is prepared by Madelyn Ndiaye, acting as Scribe for [employer]      The scribe's documentation has been prepared under my direction and personally reviewed by me in its entirety. I confirm that the note above accurately reflects all work, treatment, procedures, and medical decision making performed by me.   MARGARITA Hernandez Search  _______________________________

## 2017-10-01 NOTE — DISCHARGE INSTRUCTIONS
Cervical Disc Disease: Care Instructions  Your Care Instructions    Cervical disc disease results from damage, disease, or wear and tear to the discs between the bones (vertebra) in your neck. The discs act as shock absorbers for the spine and keep the spine flexible. When a disc is damaged, it can bulge out and press against the nerve roots or spinal cord. This is sometimes called a herniated or \"slipped disc. \" This pressure can cause pain and numbness or tingling in your arms and hands. It can also cause weakness in your legs. An accident can damage a disc and cause it to break open (rupture). Aging and hard physical work can also cause damage to cervical discs. The first treatments for cervical disc disease include physical therapy, special neck exercises, heat, and pain medicine. If these fail, your doctor may inject steroids and pain medicine into your neck. Surgery is usually done only if other treatments have not worked. Follow-up care is a key part of your treatment and safety. Be sure to make and go to all appointments, and call your doctor if you are having problems. It's also a good idea to know your test results and keep a list of the medicines you take. How can you care for yourself at home? · Take pain medicines exactly as directed. ¨ If the doctor gave you a prescription medicine for pain, take it as prescribed. ¨ If you are not taking a prescription pain medicine, ask your doctor if you can take an over-the-counter medicine. · Don't spend too long in one position. Take short breaks to move around and change positions. · Wear a seat belt and shoulder harness when you are in a car. · Sleep with a pillow under your head and neck that keeps your neck straight. · Follow your doctor's instructions for gentle neck-stretching exercises. · Do not smoke. Smoking can slow healing of your discs. If you need help quitting, talk to your doctor about stop-smoking programs and medicines.  These can increase your chances of quitting for good. · Avoid strenuous work or exercise until your doctor says it is okay. When should you call for help? Call 911 anytime you think you may need emergency care. For example, call if:  · You are unable to move an arm or a leg at all. Call your doctor now or seek immediate medical care if:  · You have new or worse symptoms in your arms, legs, chest, belly, or buttocks. Symptoms may include:  ¨ Numbness or tingling. ¨ Weakness. ¨ Pain. · You lose bladder or bowel control. Watch closely for changes in your health, and be sure to contact your doctor if:  · You are not getting better as expected. Where can you learn more? Go to http://ladarius-pavel.info/. Enter N118 in the search box to learn more about \"Cervical Disc Disease: Care Instructions. \"  Current as of: March 21, 2017  Content Version: 11.3  © 3109-9834 [employer]. Care instructions adapted under license by [employer] (which disclaims liability or warranty for this information). If you have questions about a medical condition or this instruction, always ask your healthcare professional. [place] [address] any warranty or liability for your use of this information. Torticollis: Care Instructions  Your Care Instructions  Torticollis is a severe tightness of the muscles on one side of the neck. The tight muscles can make the head turn to one side, lean to one side, or be pulled forward or backward. It is also called wryneck. Your doctor asked questions about your health and examined you. You may also have had X-rays or other tests. If your doctor thinks another medical problem is causing your tight neck muscles, you may need more tests. Torticollis usually gets better with home care. Your doctor may have you take medicine to relieve pain or relax your muscles.  He or she may suggest exercise and physical therapy to help increase flexibility and relieve stress. Your doctor may also have you wear a special collar, called a cervical collar, for a day or two. The collar may help make your neck more comfortable. Follow-up care is a key part of your treatment and safety. Be sure to make and go to all appointments, and call your doctor if you are having problems. It's also a good idea to know your test results and keep a list of the medicines you take. How can you care for yourself at home? · Be safe with medicines. Read and follow all instructions on the label. ¨ If the doctor gave you a prescription medicine for pain, take it as prescribed. ¨ If you are not taking a prescription pain medicine, ask your doctor if you can take an over-the-counter medicine. · Try using a heating pad on a low or medium setting for 15 to 20 minutes every 2 or 3 hours. Try a warm shower in place of one session with the heating pad. · Try using an ice pack for 10 to 15 minutes every 2 to 3 hours. Put a thin cloth between the ice and your skin. · If your doctor recommends a cervical collar, wear it exactly as directed. When should you call for help? Call your doctor now or seek immediate medical care if:  · You have new or worse numbness in your arms, buttocks, or legs. · You have new or worse weakness in your arms or legs. · Your neck pain gets worse. · You lose bladder or bowel control. Watch closely for changes in your health, and be sure to contact your doctor if:  · You do not get better as expected. Where can you learn more? Go to http://ladarius-pavel.info/. Enter C916 in the search box to learn more about \"Torticollis: Care Instructions. \"  Current as of: March 21, 2017  Content Version: 11.3  © 8736-5604 [employer]. Care instructions adapted under license by [employer] (which disclaims liability or warranty for this information).  If you have questions about a medical condition or this instruction, always ask your healthcare professional. [address] any warranty or liability for your use of this information.

## 2017-10-14 DIAGNOSIS — G43.009 MIGRAINE WITHOUT AURA AND WITHOUT STATUS MIGRAINOSUS, NOT INTRACTABLE: ICD-10-CM

## 2017-10-14 RX ORDER — BUTALBITAL, ACETAMINOPHEN AND CAFFEINE 50; 325; 40 MG/1; MG/1; MG/1
1 TABLET ORAL
Qty: 15 TAB | Refills: 0 | Status: SHIPPED | OUTPATIENT
Start: 2017-10-14 | End: 2017-11-16 | Stop reason: SDUPTHER

## 2017-10-16 ENCOUNTER — TELEPHONE (OUTPATIENT)
Dept: NEUROLOGY | Age: 37
End: 2017-10-16

## 2017-10-18 ENCOUNTER — DOCUMENTATION ONLY (OUTPATIENT)
Dept: NEUROLOGY | Age: 37
End: 2017-10-18

## 2017-11-16 ENCOUNTER — TELEPHONE (OUTPATIENT)
Dept: NEUROLOGY | Age: 37
End: 2017-11-16

## 2017-11-16 DIAGNOSIS — G43.009 MIGRAINE WITHOUT AURA AND WITHOUT STATUS MIGRAINOSUS, NOT INTRACTABLE: ICD-10-CM

## 2017-11-16 RX ORDER — BUTALBITAL, ACETAMINOPHEN AND CAFFEINE 50; 325; 40 MG/1; MG/1; MG/1
1 TABLET ORAL
Qty: 15 TAB | Refills: 1 | Status: SHIPPED | OUTPATIENT
Start: 2017-11-16 | End: 2018-01-18 | Stop reason: SDUPTHER

## 2017-11-16 NOTE — TELEPHONE ENCOUNTER
Spoke with patient regarding his foricet per Dr. Radha Damon he will need to show up for his appointment on 1/18/18 at 3 pm, and he will only refill medication for (1) one month. Patient states that reason for his no show appointment and his rescheduled appointment is due to his insurance. I will make Dr. Radha Damon aware.

## 2017-11-20 ENCOUNTER — DOCUMENTATION ONLY (OUTPATIENT)
Dept: NEUROLOGY | Age: 37
End: 2017-11-20

## 2017-11-20 NOTE — PROGRESS NOTES
Faxed on 11/16/2017 to Philippe Andersen a prescription for Butalbial-acetaminophen-caffeine -40 mg tablet to [phone].

## 2017-11-24 RX ORDER — BUSPIRONE HYDROCHLORIDE 15 MG/1
TABLET ORAL
Qty: 60 TAB | Refills: 3 | Status: SHIPPED | OUTPATIENT
Start: 2017-11-24 | End: 2018-09-02 | Stop reason: SDUPTHER

## 2018-01-18 ENCOUNTER — OFFICE VISIT (OUTPATIENT)
Dept: NEUROLOGY | Age: 38
End: 2018-01-18

## 2018-01-18 VITALS
OXYGEN SATURATION: 99 % | WEIGHT: 247.8 LBS | HEIGHT: 69 IN | HEART RATE: 90 BPM | SYSTOLIC BLOOD PRESSURE: 126 MMHG | BODY MASS INDEX: 36.7 KG/M2 | DIASTOLIC BLOOD PRESSURE: 72 MMHG

## 2018-01-18 DIAGNOSIS — G47.33 OBSTRUCTIVE SLEEP APNEA: ICD-10-CM

## 2018-01-18 DIAGNOSIS — G44.86 CERVICOGENIC HEADACHE: Primary | ICD-10-CM

## 2018-01-18 DIAGNOSIS — G43.009 MIGRAINE WITHOUT AURA AND WITHOUT STATUS MIGRAINOSUS, NOT INTRACTABLE: ICD-10-CM

## 2018-01-18 RX ORDER — AMITRIPTYLINE HYDROCHLORIDE 25 MG/1
25 TABLET, FILM COATED ORAL
Qty: 30 TAB | Refills: 5 | Status: SHIPPED | OUTPATIENT
Start: 2018-01-18 | End: 2019-06-17

## 2018-01-18 RX ORDER — BUTALBITAL, ACETAMINOPHEN AND CAFFEINE 50; 325; 40 MG/1; MG/1; MG/1
1 TABLET ORAL
Qty: 15 TAB | Refills: 5 | Status: SHIPPED | OUTPATIENT
Start: 2018-01-18 | End: 2018-04-16 | Stop reason: SDUPTHER

## 2018-01-18 NOTE — MR AVS SNAPSHOT
[address] [facility], 
[address], [address] [facility] 
[phone] Patient: Earla Runner MRN:  [id number] Visit Information Date & Time Provider Department Dept. Phone Encounter #  
 1/18/2018 11:40 AM Veronica Cruz MD [facility] [place] [phone] [id number] Your Appointments 7/18/2018 11:40 AM  
Follow Up with Veronica Cruz MD  
Neurology [facility] at [facility] Appt Note: [address], 
[address], [address] [id number]  
[address], [address], [address] [id number] Upcoming Health Maintenance Date Due  
 EYE EXAM RETINAL OR DILATED Q1 2/16/1989 Pneumococcal 19-64 Medium Risk (1 of 1 - PPSV23) 2/16/1998 DTaP/Tdap/Td series (1 - Tdap) 2/16/2000 HEMOGLOBIN A1C Q6M 5/28/2017 Influenza Age 5 to Adult 8/1/2017 MICROALBUMIN Q1 11/28/2017 LIPID PANEL Q1 11/28/2017 FOOT EXAM Q1 12/16/2017 Allergies as of 1/18/2018  Review Complete On: 1/18/2018 By: Veronica Cruz MD  
  
 Severity Noted Reaction Type Reactions Demerol [Meperidine] High 03/29/2012   Topical Hives Penicillin G  09/07/2011    Swelling Percocet [Oxycodone-acetaminophen]  03/23/2012    Hives, Nausea and Vomiting  
 Sulfa (Sulfonamide Antibiotics)  09/07/2011    Swelling Current Immunizations  Reviewed on 11/28/2016 Name Date Influenza Vaccine (Quad) PF 11/28/2016 Not reviewed this visit You Were Diagnosed With   
  
 Codes Comments Cervicogenic headache    -  Primary ICD-10-CM: B19 ICD-9-CM: 784.0 Migraine without aura and without status migrainosus, not intractable     ICD-10-CM: L97.952 ICD-9-CM: 346.10 Obstructive sleep apnea     ICD-10-CM: G47.33 
ICD-9-CM: 327.23 Vitals BP Pulse Height(growth percentile) Weight(growth percentile) SpO2 BMI  
 126/72 90 5' 9\" (1.753 m) 247 lb 12.8 oz (112.4 kg) 99% 36.59 kg/m2 Smoking Status Former Smoker BMI and BSA Data Body Mass Index Body Surface Area  
 36.59 kg/m 2 2.34 m 2 Preferred Pharmacy Pharmacy Name Phone [facility] [address] [place] [address] Dr [address], [address] [phone] Your Updated Medication List  
  
   
This list is accurate as of: 1/18/18 12:05 PM.  Always use your most recent med list.  
  
  
  
  
 amitriptyline 25 mg tablet Commonly known as:  ELAVIL Take 1 Tab by mouth nightly. 12.5 mg p.o. nightly for 1 week and then 25 mg p.o. nightly  
  
 busPIRone 15 mg tablet Commonly known as:  BUSPAR  
TAKE ONE TABLET BY MOUTH TWICE DAILY  
  
 butalbital-acetaminophen-caffeine -40 mg per tablet Commonly known as:  ESGIC Take 1 Tab by mouth every six (6) hours as needed for Pain. PLEASE HAVE PATIENT CALL FOR APPOINTMENT FOR FURTHER FILLS  
  
 cyclobenzaprine 10 mg tablet Commonly known as:  FLEXERIL Take 1 Tab by mouth three (3) times daily (with meals). Indications: MUSCLE SPASM HumaLOG 100 unit/mL injection Generic drug:  insulin lispro HYDROcodone-acetaminophen 5-325 mg per tablet Commonly known as:  Kayla Delgado Take 1-2 Tabs by mouth every six (6) hours as needed for Pain. Max Daily Amount: 8 Tabs. insulin  unit/mL injection Commonly known as:  NOVOLIN N, HUMULIN N  
100 units twice daily  
  
 insulin regular 100 unit/mL injection Commonly known as:  NOVOLIN R, HUMULIN R  
15-20 units TID AC  
  
 levothyroxine 112 mcg tablet Commonly known as:  SYNTHROID Take 1 Tab by mouth Daily (before breakfast). lisinopril 10 mg tablet Commonly known as:  Tyrel Boerne Take 1 Tab by mouth daily. naproxen 500 mg tablet Commonly known as:  NAPROSYN  
 Take 1 Tab by mouth every twelve (12) hours as needed for Pain. sertraline 100 mg tablet Commonly known as:  ZOLOFT Take 1 Tab by mouth daily. Prescriptions Sent to Pharmacy Refills  
 butalbital-acetaminophen-caffeine (ESGIC) -40 mg per tablet 5 Sig: Take 1 Tab by mouth every six (6) hours as needed for Pain. PLEASE HAVE PATIENT CALL FOR APPOINTMENT FOR FURTHER FILLS Class: Normal  
 Pharmacy: [facility] DR ARACELI SANCHEZ [address] Ph #: [phone] Route: Oral  
 amitriptyline (ELAVIL) 25 mg tablet 5 Sig: Take 1 Tab by mouth nightly. 12.5 mg p.o. nightly for 1 week and then 25 mg p.o. nightly Class: Normal  
 Pharmacy: [facility] DR ARACELI SANCHEZ [address] Ph #: [phone] Route: Oral  
  
Patient Instructions 1. Amitriptyline 12.5 mg po qhs for 1 wk and then 25 mg po qhs 
2. Fu in 6 months A Healthy Lifestyle: Care Instructions Your Care Instructions A healthy lifestyle can help you feel good, stay at a healthy weight, and have plenty of energy for both work and play. A healthy lifestyle is something you can share with your whole family. A healthy lifestyle also can lower your risk for serious health problems, such as high blood pressure, heart disease, and diabetes. You can follow a few steps listed below to improve your health and the health of your family. Follow-up care is a key part of your treatment and safety. Be sure to make and go to all appointments, and call your doctor if you are having problems. It's also a good idea to know your test results and keep a list of the medicines you take. How can you care for yourself at home? · Do not eat too much sugar, fat, or fast foods. You can still have dessert and treats now and then. The goal is moderation. · Start small to improve your eating habits.  Pay attention to portion sizes, drink less juice and soda pop, and eat more fruits and vegetables. ¨ Eat a healthy amount of food. A 3-ounce serving of meat, for example, is about the size of a deck of cards. Fill the rest of your plate with vegetables and whole grains. ¨ Limit the amount of soda and sports drinks you have every day. Drink more water when you are thirsty. ¨ Eat at least 5 servings of fruits and vegetables every day. It may seem like a lot, but it is not hard to reach this goal. A serving or helping is 1 piece of fruit, 1 cup of vegetables, or 2 cups of leafy, raw vegetables. Have an apple or some carrot sticks as an afternoon snack instead of a candy bar. Try to have fruits and/or vegetables at every meal. 
· Make exercise part of your daily routine. You may want to start with simple activities, such as walking, bicycling, or slow swimming. Try to be active 30 to 60 minutes every day. You do not need to do all 30 to 60 minutes all at once. For example, you can exercise 3 times a day for 10 or 20 minutes. Moderate exercise is safe for most people, but it is always a good idea to talk to your doctor before starting an exercise program. 
· Keep moving. Willa Hoop the lawn, work in the garden, or [place]. Take the stairs instead of the elevator at work. · If you smoke, quit. People who smoke have an increased risk for heart attack, stroke, cancer, and other lung illnesses. Quitting is hard, but there are ways to boost your chance of quitting tobacco for good. ¨ Use nicotine gum, patches, or lozenges. ¨ Ask your doctor about stop-smoking programs and medicines. ¨ Keep trying. In addition to reducing your risk of diseases in the future, you will notice some benefits soon after you stop using tobacco. If you have shortness of breath or asthma symptoms, they will likely get better within a few weeks after you quit. · Limit how much alcohol you drink.  Moderate amounts of alcohol (up to 2 drinks a day for men, 1 drink a day for women) are okay. But drinking too much can lead to liver problems, high blood pressure, and other health problems. Family health If you have a family, there are many things you can do together to improve your health. · Eat meals together as a family as often as possible. · Eat healthy foods. This includes fruits, vegetables, lean meats and dairy, and whole grains. · Include your family in your fitness plan. Most people think of activities such as jogging or tennis as the way to fitness, but there are many ways you and your family can be more active. Anything that makes you breathe hard and gets your heart pumping is exercise. Here are some tips: 
¨ Walk to do errands or to take your child to school or the bus. ¨ Go for a family bike ride after dinner instead of watching TV. Where can you learn more? Go to http://ladarius-pavel.info/. Enter O709 in the search box to learn more about \"A Healthy Lifestyle: Care Instructions. \" Current as of: May 12, 2017 Content Version: 11.4 © 4122-1211 [employer]. Care instructions adapted under license by [employer] (which disclaims liability or warranty for this information). If you have questions about a medical condition or this instruction, always ask your healthcare professional. [address] any warranty or liability for your use of this information. Introducing [facility]! [facility] introduces [employer]ia patient portal. Now you can access parts of your medical record, email your doctor's office, and request medication refills online. 1. In your internet browser, go to [email or web address] 2. Click on the First Time User? Click Here link in the Sign In box. You will see the New Member Sign Up page. 3. Enter your Pi-Cardia Access Code exactly as it appears below.  You will not need to use this code after youve completed the sign-up process. If you do not sign up before the expiration date, you must request a new code. · [employer] Access Code: [email or web address] Expires: 4/18/2018 11:44 AM 
 
4. Enter the last four digits of your Social Security Number (xxxx) and Date of Birth (mm/dd/yyyy) as indicated and click Submit. You will be taken to the next sign-up page. 5. Create a [employer] ID. This will be your [employer] login ID and cannot be changed, so think of one that is secure and easy to remember. 6. Create a [employer] password. You can change your password at any time. 7. Enter your Password Reset Question and Answer. This can be used at a later time if you forget your password. 8. Enter your e-mail address. You will receive e-mail notification when new information is available in [address] 9. Click Sign Up. You can now view and download portions of your medical record. 10. Click the Download Summary menu link to download a portable copy of your medical information. If you have questions, please visit the Frequently Asked Questions section of the [employer] website. Remember, [employer] is NOT to be used for urgent needs. For medical emergencies, dial 911. Now available from your iPhone and Android! Please provide this summary of care documentation to your next provider. Your primary care clinician is listed as Jorge Javed. If you have any questions after today's visit, please call [phone].

## 2018-01-18 NOTE — PATIENT INSTRUCTIONS
1. Amitriptyline 12.5 mg po qhs for 1 wk and then 25 mg po qhs  2. Fu in 6 months     A Healthy Lifestyle: Care Instructions  Your Care Instructions    A healthy lifestyle can help you feel good, stay at a healthy weight, and have plenty of energy for both work and play. A healthy lifestyle is something you can share with your whole family. A healthy lifestyle also can lower your risk for serious health problems, such as high blood pressure, heart disease, and diabetes. You can follow a few steps listed below to improve your health and the health of your family. Follow-up care is a key part of your treatment and safety. Be sure to make and go to all appointments, and call your doctor if you are having problems. It's also a good idea to know your test results and keep a list of the medicines you take. How can you care for yourself at home? · Do not eat too much sugar, fat, or fast foods. You can still have dessert and treats now and then. The goal is moderation. · Start small to improve your eating habits. Pay attention to portion sizes, drink less juice and soda pop, and eat more fruits and vegetables. ¨ Eat a healthy amount of food. A 3-ounce serving of meat, for example, is about the size of a deck of cards. Fill the rest of your plate with vegetables and whole grains. ¨ Limit the amount of soda and sports drinks you have every day. Drink more water when you are thirsty. ¨ Eat at least 5 servings of fruits and vegetables every day. It may seem like a lot, but it is not hard to reach this goal. A serving or helping is 1 piece of fruit, 1 cup of vegetables, or 2 cups of leafy, raw vegetables. Have an apple or some carrot sticks as an afternoon snack instead of a candy bar. Try to have fruits and/or vegetables at every meal.  · Make exercise part of your daily routine. You may want to start with simple activities, such as walking, bicycling, or slow swimming. Try to be active 30 to 60 minutes every day.  You do not need to do all 30 to 60 minutes all at once. For example, you can exercise 3 times a day for 10 or 20 minutes. Moderate exercise is safe for most people, but it is always a good idea to talk to your doctor before starting an exercise program.  · Keep moving. Vanessa Bernheim the lawn, work in the garden, or [employer]. Take the stairs instead of the elevator at work. · If you smoke, quit. People who smoke have an increased risk for heart attack, stroke, cancer, and other lung illnesses. Quitting is hard, but there are ways to boost your chance of quitting tobacco for good. ¨ Use nicotine gum, patches, or lozenges. ¨ Ask your doctor about stop-smoking programs and medicines. ¨ Keep trying. In addition to reducing your risk of diseases in the future, you will notice some benefits soon after you stop using tobacco. If you have shortness of breath or asthma symptoms, they will likely get better within a few weeks after you quit. · Limit how much alcohol you drink. Moderate amounts of alcohol (up to 2 drinks a day for men, 1 drink a day for women) are okay. But drinking too much can lead to liver problems, high blood pressure, and other health problems. Family health  If you have a family, there are many things you can do together to improve your health. · Eat meals together as a family as often as possible. · Eat healthy foods. This includes fruits, vegetables, lean meats and dairy, and whole grains. · Include your family in your fitness plan. Most people think of activities such as jogging or tennis as the way to fitness, but there are many ways you and your family can be more active. Anything that makes you breathe hard and gets your heart pumping is exercise. Here are some tips:  ¨ Walk to do errands or to take your child to school or the bus. ¨ Go for a family bike ride after dinner instead of watching TV. Where can you learn more? Go to http://ladarius-pavel.info/.   Enter V423 in the search box to learn more about \"A Healthy Lifestyle: Care Instructions. \"  Current as of: May 12, 2017  Content Version: 11.4  © 0932-3909 [employer]. Care instructions adapted under license by [employer] (which disclaims liability or warranty for this information). If you have questions about a medical condition or this instruction, always ask your healthcare professional. [address] any warranty or liability for your use of this information.

## 2018-01-18 NOTE — LETTER
Jane Emmanuel 44 y.o. male is my office patient and he was here today, 01/18/18 for a follow up visit.  
 
 
 
 
Cherelle Hernandez MD 
Neurologist

## 2018-01-18 NOTE — LETTER
1/18/2018 1:39 PM 
 
Patient:    Moraima Ferrera YOB: 1980 Date of Visit:    1/18/2018 Dear John Caryr, MD 
 
Thank you for referring Mr. Britney Chavez to me for evaluation/treatment. Below are the relevant portions of my assessment and plan of care. NEUROLOGY NEW PATIENT CONSULATION  
 
REFERRED BY: 
John Enamorado MD 
 
01/18/18 Chief Complaint Patient presents with  Follow-up Headaches SUBJECTIVE: 
 
Moraima Ferrera is a 44 y.o. male who presented to the neurology office for management of headaches. He has been having headaches for a long time but has been having worsening headaches since 2012. He does have close to 20 headache days in a month. He has 2 kinds of headache, one but starts of the right occipital area which is a sharp headache that shoots to the right frontal.  He does also have headaches in the left side as well. His headaches can also be pounding and throbbing and the headache severity is between 3-10 out of 10. He does have associated photophobia, phonophobia and osmophobia. Each headache can last for 4-5 days. He was recently in the emergency room and was given Fioricet and hydrocodone and he states that Percocet is helping him. Since the last visit I wrote for Topamax but he was not able to tolerate that. He continues to take Fioricet. Risk Factors for headaches Smoking: Denies denies Coffee: Denies cups/day Tea: Denies cups/day Soda: 6 cans/day Water: 1-2 glasses/day Sleeps at 8-11 p.m. and wakes up at 5:30 aM. He does snore. He has been diagnosed with obstructive sleep apnea but did not get the CPAP machine because he lost his insurance. Medications tried Preventative therapy: 
Topamax Abortive therapy Fioricet Current Outpatient Prescriptions Medication Sig  butalbital-acetaminophen-caffeine (ESGIC) -40 mg per tablet Take 1 Tab by mouth every six (6) hours as needed for Pain.  PLEASE HAVE PATIENT CALL FOR APPOINTMENT FOR FURTHER FILLS  amitriptyline (ELAVIL) 25 mg tablet Take 1 Tab by mouth nightly. 12.5 mg p.o. nightly for 1 week and then 25 mg p.o. nightly  busPIRone (BUSPAR) 15 mg tablet TAKE ONE TABLET BY MOUTH TWICE DAILY  cyclobenzaprine (FLEXERIL) 10 mg tablet Take 1 Tab by mouth three (3) times daily (with meals). Indications: MUSCLE SPASM  
 HYDROcodone-acetaminophen (NORCO) 5-325 mg per tablet Take 1-2 Tabs by mouth every six (6) hours as needed for Pain. Max Daily Amount: 8 Tabs.  naproxen (NAPROSYN) 500 mg tablet Take 1 Tab by mouth every twelve (12) hours as needed for Pain.  HUMALOG 100 unit/mL injection  sertraline (ZOLOFT) 100 mg tablet Take 1 Tab by mouth daily. (Patient taking differently: Take 100 mg by mouth two (2) times a day.)  insulin NPH (NOVOLIN N, HUMULIN N) 100 unit/mL injection 100 units twice daily  insulin regular (NOVOLIN R, HUMULIN R) 100 unit/mL injection 15-20 units TID AC (Patient taking differently: 15-20 units TID AC  Indications: 0-20u BID, per pt)  levothyroxine (SYNTHROID) 112 mcg tablet Take 1 Tab by mouth Daily (before breakfast).  lisinopril (PRINIVIL, ZESTRIL) 10 mg tablet Take 1 Tab by mouth daily. (Patient taking differently: Take 20 mg by mouth daily.) No current facility-administered medications for this visit. Allergies Allergen Reactions  Demerol [Meperidine] Hives  Penicillin G Swelling  Percocet [Oxycodone-Acetaminophen] Hives and Nausea and Vomiting  Sulfa (Sulfonamide Antibiotics) Swelling Past Medical History:  
Diagnosis Date  Chronic headaches 2007  Depression  Diabetes ([facility] [address]  Hypertension  Thyroid disease   
 hypothyroid  Unspecified sleep apnea   
 does not use CPAP Past Surgical History:  
Procedure Laterality Date  HX CHOLECYSTECTOMY  8/26/14 Dr Amador Chaney  HX HEENT    
 wisdom teeth removed  HX ORTHOPAEDIC    
 carpel tunnel Family History Problem Relation Age of Onset  Diabetes Mother  Heart Disease Father  Cancer Father  Diabetes Father  Diabetes Paternal Aunt Social History Substance Use Topics  Smoking status: Former Smoker Packs/day: 0.00 Years: 14.00 Quit date: 1/1/2014  Smokeless tobacco: Never Used  Alcohol use No  
 
 
REVIEW OF SYSTEMS:  
A ten system review of constitutional, cardiovascular, respiratory, musculoskeletal, endocrine, skin, SHEENT, genitourinary, psychiatric and neurologic systems was obtained and is unremarkable with the exception of the following: Anxiety, depression, fatigue, headaches, hearing loss, joint pain, skipped beats, snoring EXAMINATION:  
Visit Vitals  /72  Pulse 90  
 Ht 5' 9\" (1.753 m)  Wt 247 lb 12.8 oz (112.4 kg)  SpO2 99%  BMI 36.59 kg/m2 General:  
General appearance: Pt is in no acute distress Distal pulses are preserved Fundoscopic Exam: Normal 
 
Neurological Examination:  
Mental Status: AAO x3. Speech is fluent. Follows commands, has normal fund of knowledge, attention, short term recall, comprehension and insight. Cranial Nerves: Visual fields are full. PERRL, Extraocular movements are full. Facial sensation intact V1- V3. Facial movement intact, symmetric. Hearing intact to conversation. Palate elevates symmetrically. Shoulder shrug symmetric. Tongue midline. Motor: Strength is 5/5 in all 4 ext. No atrophy. Tone: Normal 
 
Sensation: Normal to light touch Reflexes: DTRs 2+ throughout. Plantar responses downgoing. Coordination/Cerebellar: Intact to finger-nose-finger Gait: Romberg is negative and casual gait is normal.  
 
Laboratory review:  
Results for orders placed or performed in visit on 11/28/16 LIPID PANEL Result Value Ref Range Cholesterol, total 394 (H) 100 - 199 mg/dL Triglyceride 1550 (HH) 0 - 149 mg/dL HDL Cholesterol 27 (L) >39 mg/dL VLDL, calculated Comment 5 - 40 mg/dL LDL, calculated Comment 0 - 99 mg/dL CBC WITH AUTOMATED DIFF Result Value Ref Range WBC 9.4 3.4 - 10.8 x10E3/uL  
 RBC 5.04 4.14 - 5.80 x10E6/uL HGB 15.0 12.6 - 17.7 g/dL HCT 41.8 37.5 - 51.0 % MCV 83 79 - 97 fL  
 MCH 29.8 26.6 - 33.0 pg  
 MCHC 35.9 (H) 31.5 - 35.7 g/dL  
 RDW 14.6 12.3 - 15.4 % PLATELET 985 887 - 092 x10E3/uL NEUTROPHILS 58 % Lymphocytes 32 % MONOCYTES 6 % EOSINOPHILS 2 % BASOPHILS 0 %  
 ABS. NEUTROPHILS 5.4 1.4 - 7.0 x10E3/uL Abs Lymphocytes 3.0 0.7 - 3.1 x10E3/uL  
 ABS. MONOCYTES 0.6 0.1 - 0.9 x10E3/uL  
 ABS. EOSINOPHILS 0.2 0.0 - 0.4 x10E3/uL  
 ABS. BASOPHILS 0.0 0.0 - 0.2 x10E3/uL IMMATURE GRANULOCYTES 2 %  
 ABS. IMM. GRANS. 0.1 0.0 - 0.1 x10E3/uL METABOLIC PANEL, COMPREHENSIVE Result Value Ref Range Glucose 238 (H) 65 - 99 mg/dL BUN 13 6 - 20 mg/dL Creatinine 0.88 0.76 - 1.27 mg/dL GFR est non- >59 mL/min/1.73 GFR est  >59 mL/min/1.73  
 BUN/Creatinine ratio 15 8 - 19 Sodium 135 (L) 136 - 144 mmol/L Potassium 4.2 3.5 - 5.2 mmol/L Chloride 94 (L) 97 - 106 mmol/L  
 CO2 23 18 - 29 mmol/L Calcium 9.8 8.7 - 10.2 mg/dL Protein, total 7.7 6.0 - 8.5 g/dL Albumin 4.5 3.5 - 5.5 g/dL GLOBULIN, TOTAL 3.2 1.5 - 4.5 g/dL A-G Ratio 1.4 1.1 - 2.5 Bilirubin, total 0.3 0.0 - 1.2 mg/dL Alk. phosphatase 114 39 - 117 IU/L  
 AST (SGOT) 24 0 - 40 IU/L  
 ALT (SGPT) 71 (H) 0 - 44 IU/L  
TSH 3RD GENERATION Result Value Ref Range TSH 10.850 (H) 0.450 - 4.500 uIU/mL CVD REPORT Result Value Ref Range INTERPRETATION Note AMB POC HEMOGLOBIN A1C Result Value Ref Range Hemoglobin A1c (POC) 10.1 % AMB POC URINE, MICROALBUMIN, SEMIQUANT (3 RESULTS) Result Value Ref Range ALBUMIN, URINE  Negative mg/L  
 CREATININE, URINE  mg/dL Microalbumin/creat ratio (POC)  mg/g [id number] CT scan of the head showed no acute process. Laboratory review: 
10/17/2016 CBC normal. CMP showed sodium of 132 and glucose 377 Documentation review: 
The patient was seen in the emergency room on 10/17/2016 because of gradually worsening chronic back pain for 2 days. The patient woke up and could not move. He does have a diagnosis of fibromyalgia. The patient was also complaining of neck pain radiating to the frontal area. The patient did get x-ray of the left knee and right knee which were normal.  The patient was given Norco, Compazine, Benadryl, Toradol and Valium the patient was discharged home on Norco one tablet every 4 hours as needed and Valium 5 mg every 8 times as needed. Assessment/Plan:  
Curry Desir is a 44 y.o. male who presented to the neurology office for management of headaches. His examination is normal and there was no evidence of papilledema. He did also get recent CT scan of the head which was normal.  He does have both chronic migraines without aura and right cervicogenic headache. He has also used Fioricet in the past and that has helped him and I will be prescribing that as well but told him not to take that more than 10 days in a month because of potential medication overuse headache. I am going to also start the patient on amitriptyline 12.5 mg p.o. nightly for a week and then 25 mg p.o. nightly He does also have obstructive sleep apnea and I did discuss with him that he should check with his primary care physician Follow-up in 6 months Thank you for allowing me to participate in the care of Mr. Roge Dunne. Please feel free to contact me if you have any questions. Guillermo Harmon MD 
Neurologist and Clinical Neurophysiologist 
 
CC: Blue Villa MD 
Fax: [phone] This note will not be viewable in [address] If you have questions, please do not hesitate to call me.   I look forward to following Mr. Cervantes along with you. Sincerely, Janina Bennett MD

## 2018-01-18 NOTE — PROGRESS NOTES
NEUROLOGY NEW PATIENT CONSULATION     REFERRED BY:  Carlos Arias MD    01/18/18    Chief Complaint   Patient presents with    Follow-up     Headaches       SUBJECTIVE:    Doug Parker is a 44 y.o. male who presented to the neurology office for management of headaches. He has been having headaches for a long time but has been having worsening headaches since 2012. He does have close to 20 headache days in a month. He has 2 kinds of headache, one but starts of the right occipital area which is a sharp headache that shoots to the right frontal.  He does also have headaches in the left side as well. His headaches can also be pounding and throbbing and the headache severity is between 3-10 out of 10. He does have associated photophobia, phonophobia and osmophobia. Each headache can last for 4-5 days. He was recently in the emergency room and was given Fioricet and hydrocodone and he states that Percocet is helping him. Since the last visit I wrote for Topamax but he was not able to tolerate that. He continues to take Fioricet. Risk Factors for headaches  Smoking: Denies denies  Coffee: Denies cups/day  Tea: Denies cups/day  Soda: 6 cans/day  Water: 1-2 glasses/day  Sleeps at 8-11 p.m. and wakes up at 5:30 aM. He does snore. He has been diagnosed with obstructive sleep apnea but did not get the CPAP machine because he lost his insurance. Medications tried  Preventative therapy:  Topamax    Abortive therapy   Fioricet    Current Outpatient Prescriptions   Medication Sig    butalbital-acetaminophen-caffeine (ESGIC) -40 mg per tablet Take 1 Tab by mouth every six (6) hours as needed for Pain. PLEASE HAVE PATIENT CALL FOR APPOINTMENT FOR FURTHER FILLS    amitriptyline (ELAVIL) 25 mg tablet Take 1 Tab by mouth nightly.  12.5 mg p.o. nightly for 1 week and then 25 mg p.o. nightly    busPIRone (BUSPAR) 15 mg tablet TAKE ONE TABLET BY MOUTH TWICE DAILY    cyclobenzaprine (FLEXERIL) 10 mg tablet Take 1 Tab by mouth three (3) times daily (with meals). Indications: MUSCLE SPASM    HYDROcodone-acetaminophen (NORCO) 5-325 mg per tablet Take 1-2 Tabs by mouth every six (6) hours as needed for Pain. Max Daily Amount: 8 Tabs.  naproxen (NAPROSYN) 500 mg tablet Take 1 Tab by mouth every twelve (12) hours as needed for Pain.  HUMALOG 100 unit/mL injection     sertraline (ZOLOFT) 100 mg tablet Take 1 Tab by mouth daily. (Patient taking differently: Take 100 mg by mouth two (2) times a day.)    insulin NPH (NOVOLIN N, HUMULIN N) 100 unit/mL injection 100 units twice daily    insulin regular (NOVOLIN R, HUMULIN R) 100 unit/mL injection 15-20 units TID AC (Patient taking differently: 15-20 units TID AC  Indications: 0-20u BID, per pt)    levothyroxine (SYNTHROID) 112 mcg tablet Take 1 Tab by mouth Daily (before breakfast).  lisinopril (PRINIVIL, ZESTRIL) 10 mg tablet Take 1 Tab by mouth daily. (Patient taking differently: Take 20 mg by mouth daily.)     No current facility-administered medications for this visit.       Allergies   Allergen Reactions    Demerol [Meperidine] Hives    Penicillin G Swelling    Percocet [Oxycodone-Acetaminophen] Hives and Nausea and Vomiting    Sulfa (Sulfonamide Antibiotics) Swelling     Past Medical History:   Diagnosis Date    Chronic headaches 2007    Depression     Diabetes ([facility] [address]     Hypertension     Thyroid disease     hypothyroid    Unspecified sleep apnea     does not use CPAP     Past Surgical History:   Procedure Laterality Date    HX CHOLECYSTECTOMY  8/26/14    Dr Nikoals Paul    HX HEENT      wisdom teeth removed    HX ORTHOPAEDIC      carpel tunnel     Family History   Problem Relation Age of Onset    Diabetes Mother     Heart Disease Father     Cancer Father     Diabetes Father     Diabetes Paternal Aunt      Social History   Substance Use Topics    Smoking status: Former Smoker     Packs/day: 0.00     Years: 14.00     Quit date: 1/1/2014   [address] [place] [address] Smokeless tobacco: Never Used    Alcohol use No       REVIEW OF SYSTEMS:   A ten system review of constitutional, cardiovascular, respiratory, musculoskeletal, endocrine, skin, SHEENT, genitourinary, psychiatric and neurologic systems was obtained and is unremarkable with the exception of the following: Anxiety, depression, fatigue, headaches, hearing loss, joint pain, skipped beats, snoring  EXAMINATION:   Visit Vitals    /72    Pulse 90    Ht 5' 9\" (1.753 m)    Wt 247 lb 12.8 oz (112.4 kg)    SpO2 99%    BMI 36.59 kg/m2        General:   General appearance: Pt is in no acute distress   Distal pulses are preserved  Fundoscopic Exam: Normal    Neurological Examination:   Mental Status: AAO x3. Speech is fluent. Follows commands, has normal fund of knowledge, attention, short term recall, comprehension and insight. Cranial Nerves: Visual fields are full. PERRL, Extraocular movements are full. Facial sensation intact V1- V3. Facial movement intact, symmetric. Hearing intact to conversation. Palate elevates symmetrically. Shoulder shrug symmetric. Tongue midline. Motor: Strength is 5/5 in all 4 ext. No atrophy. Tone: Normal    Sensation: Normal to light touch    Reflexes: DTRs 2+ throughout. Plantar responses downgoing.      Coordination/Cerebellar: Intact to finger-nose-finger     Gait: Romberg is negative and casual gait is normal.     Laboratory review:   Results for orders placed or performed in visit on 11/28/16   LIPID PANEL   Result Value Ref Range    Cholesterol, total 394 (H) 100 - 199 mg/dL    Triglyceride 1550 (HH) 0 - 149 mg/dL    HDL Cholesterol 27 (L) >39 mg/dL    VLDL, calculated Comment 5 - 40 mg/dL    LDL, calculated Comment 0 - 99 mg/dL   CBC WITH AUTOMATED DIFF   Result Value Ref Range    WBC 9.4 3.4 - 10.8 x10E3/uL    RBC 5.04 4.14 - 5.80 x10E6/uL    HGB 15.0 12.6 - 17.7 g/dL    HCT 41.8 37.5 - 51.0 %    MCV 83 79 - 97 fL    MCH 29.8 26.6 - 33.0 pg    MCHC 35.9 (H) 31.5 - 35.7 g/dL    RDW 14.6 12.3 - 15.4 %    PLATELET 766 661 - 685 x10E3/uL    NEUTROPHILS 58 %    Lymphocytes 32 %    MONOCYTES 6 %    EOSINOPHILS 2 %    BASOPHILS 0 %    ABS. NEUTROPHILS 5.4 1.4 - 7.0 x10E3/uL    Abs Lymphocytes 3.0 0.7 - 3.1 x10E3/uL    ABS. MONOCYTES 0.6 0.1 - 0.9 x10E3/uL    ABS. EOSINOPHILS 0.2 0.0 - 0.4 x10E3/uL    ABS. BASOPHILS 0.0 0.0 - 0.2 x10E3/uL    IMMATURE GRANULOCYTES 2 %    ABS. IMM. GRANS. 0.1 0.0 - 0.1 [id number]   METABOLIC PANEL, COMPREHENSIVE   Result Value Ref Range    Glucose 238 (H) 65 - 99 mg/dL    BUN 13 6 - 20 mg/dL    Creatinine 0.88 0.76 - 1.27 mg/dL    GFR est non- >59 mL/min/1.73    GFR est  >59 mL/min/1.73    BUN/Creatinine ratio 15 8 - 19    Sodium 135 (L) 136 - 144 mmol/L    Potassium 4.2 3.5 - 5.2 mmol/L    Chloride 94 (L) 97 - 106 mmol/L    CO2 23 18 - 29 mmol/L    Calcium 9.8 8.7 - 10.2 mg/dL    Protein, total 7.7 6.0 - 8.5 g/dL    Albumin 4.5 3.5 - 5.5 g/dL    GLOBULIN, TOTAL 3.2 1.5 - 4.5 g/dL    A-G Ratio 1.4 1.1 - 2.5    Bilirubin, total 0.3 0.0 - 1.2 mg/dL    Alk. phosphatase 114 39 - 117 IU/L    AST (SGOT) 24 0 - 40 IU/L    ALT (SGPT) 71 (H) 0 - 44 IU/L   TSH 3RD GENERATION   Result Value Ref Range    TSH 10.850 (H) 0.450 - 4.500 uIU/mL   CVD REPORT   Result Value Ref Range    INTERPRETATION Note    AMB POC HEMOGLOBIN A1C   Result Value Ref Range    Hemoglobin A1c (POC) 10.1 %   AMB POC URINE, MICROALBUMIN, SEMIQUANT (3 RESULTS)   Result Value Ref Range    ALBUMIN, URINE  Negative mg/L    CREATININE, URINE  mg/dL    Microalbumin/creat ratio (POC)  mg/g       [id number]  CT scan of the head showed no acute process. Laboratory review:  10/17/2016  CBC normal. CMP showed sodium of 132 and glucose 377    Documentation review:  The patient was seen in the emergency room on 10/17/2016 because of gradually worsening chronic back pain for 2 days. The patient woke up and could not move. He does have a diagnosis of fibromyalgia. The patient was also complaining of neck pain radiating to the frontal area. The patient did get x-ray of the left knee and right knee which were normal.  The patient was given Norco, Compazine, Benadryl, Toradol and Valium the patient was discharged home on Norco one tablet every 4 hours as needed and Valium 5 mg every 8 times as needed. Assessment/Plan:   Ezekiel Briones is a 44 y.o. male who presented to the neurology office for management of headaches. His examination is normal and there was no evidence of papilledema. He did also get recent CT scan of the head which was normal.  He does have both chronic migraines without aura and right cervicogenic headache. He has also used Fioricet in the past and that has helped him and I will be prescribing that as well but told him not to take that more than 10 days in a month because of potential medication overuse headache. I am going to also start the patient on amitriptyline 12.5 mg p.o. nightly for a week and then 25 mg p.o. nightly    He does also have obstructive sleep apnea and I did discuss with him that he should check with his primary care physician    Follow-up in 6 months      Thank you for allowing me to participate in the care of Mr. Roxy Stanton. Please feel free to contact me if you have any questions. Naun Velázquez MD  Neurologist and Clinical Neurophysiologist    CC: Francesca De Los Santos MD  Fax: [phone]    This note will not be viewable in [address]

## 2018-01-30 DIAGNOSIS — E78.1 HYPERTRIGLYCERIDEMIA: ICD-10-CM

## 2018-02-04 RX ORDER — SERTRALINE HYDROCHLORIDE 100 MG/1
TABLET, FILM COATED ORAL
Qty: 30 TAB | Refills: 11 | Status: SHIPPED | OUTPATIENT
Start: 2018-02-04 | End: 2019-02-23 | Stop reason: SDUPTHER

## 2018-02-04 RX ORDER — GEMFIBROZIL 600 MG/1
TABLET, FILM COATED ORAL
Qty: 60 TAB | Refills: 11 | Status: SHIPPED | OUTPATIENT
Start: 2018-02-04 | End: 2018-02-22 | Stop reason: ALTCHOICE

## 2018-02-04 NOTE — TELEPHONE ENCOUNTER
Approved zoloft and Gemfibrozil. Has not been seen for a year. Has poorly controlled diabetes. Is he seeing an endocrinologist??? Needs to be seeing someone every 3 months.  If needed, make apt to see us

## 2018-02-19 ENCOUNTER — OFFICE VISIT (OUTPATIENT)
Dept: FAMILY MEDICINE CLINIC | Age: 38
End: 2018-02-19

## 2018-02-19 VITALS
RESPIRATION RATE: 16 BRPM | TEMPERATURE: 98 F | BODY MASS INDEX: 37.92 KG/M2 | DIASTOLIC BLOOD PRESSURE: 84 MMHG | HEIGHT: 69 IN | HEART RATE: 104 BPM | WEIGHT: 256 LBS | OXYGEN SATURATION: 97 % | SYSTOLIC BLOOD PRESSURE: 133 MMHG

## 2018-02-19 DIAGNOSIS — N52.9 ERECTILE DYSFUNCTION, UNSPECIFIED ERECTILE DYSFUNCTION TYPE: ICD-10-CM

## 2018-02-19 DIAGNOSIS — Z79.4 TYPE 2 DIABETES MELLITUS WITH COMPLICATION, WITH LONG-TERM CURRENT USE OF INSULIN (HCC): Primary | Chronic | ICD-10-CM

## 2018-02-19 DIAGNOSIS — G47.30 SLEEP APNEA, UNSPECIFIED TYPE: ICD-10-CM

## 2018-02-19 DIAGNOSIS — E78.1 HYPERTRIGLYCERIDEMIA: ICD-10-CM

## 2018-02-19 DIAGNOSIS — Z23 ENCOUNTER FOR IMMUNIZATION: ICD-10-CM

## 2018-02-19 DIAGNOSIS — R80.9 MICROALBUMINURIA: ICD-10-CM

## 2018-02-19 DIAGNOSIS — M79.7 FIBROMYALGIA: ICD-10-CM

## 2018-02-19 DIAGNOSIS — I10 ESSENTIAL HYPERTENSION: Chronic | ICD-10-CM

## 2018-02-19 DIAGNOSIS — E11.8 TYPE 2 DIABETES MELLITUS WITH COMPLICATION, WITH LONG-TERM CURRENT USE OF INSULIN (HCC): Primary | Chronic | ICD-10-CM

## 2018-02-19 DIAGNOSIS — E03.9 ACQUIRED HYPOTHYROIDISM: ICD-10-CM

## 2018-02-19 LAB — HBA1C MFR BLD HPLC: 10.6 %

## 2018-02-19 RX ORDER — SILDENAFIL CITRATE 20 MG/1
60 TABLET ORAL
Qty: 30 TAB | Refills: 2 | Status: SHIPPED | OUTPATIENT
Start: 2018-02-19 | End: 2018-03-20

## 2018-02-19 NOTE — PROGRESS NOTES
.  Chief Complaint   Patient presents with    Diabetes       . 1. Have you been to the ER, urgent care clinic since your last visit? Hospitalized since your last visit? Yes    2. Have you seen or consulted any other health care providers outside of the [facility] since your last visit? Include any pap smears or colon screening. YES  Neck and back issues  . Jurgen Greene

## 2018-02-19 NOTE — PROGRESS NOTES
Santo Caballero is a 43 y.o. male who presents for Tdap immunization. He denies any symptoms , reactions or allergies that would exclude them from being immunized today. Risks and adverse reactions were discussed and the VIS was given to them. All questions were addressed. He was observed for 10 min post injection. There were no reactions observed.     Jose Cage

## 2018-02-19 NOTE — PROGRESS NOTES
FERNANDO Robert is a 43 y.o. male who presents follow-up on diabetes and other chronic issues. It has been a year since I have seen him. He had poorly controlled diabetes at our last visit. He used to see endocrinology Dr. Sawyer Felt but does not anymore. He carries a diagnosis of fibromyalgia    He has neck and back pain and went to see orthopedics since I saw him last.  He had an MRI that showed reportedly a bulging disc  around C5-C6 he believes. He had an epidural injection and a facet injection which provided him some relief for 4 days each. Because there was little to no improvement at his follow-up with Ortho over the summer it was deemed that his neck pain is related to muscle stiffness and he recommended physical therapy with dry needling. Dry needling has worked very well for him in the past but he did not follow-up to do it regularly. .    Is also seeing a neurologist because he is having headaches. These appear to be right sided parietal/temp oral.  Neurologist started him on amitriptyline 25 mg which initially helped him sleep but he does not feel like there is much effect anymore. Some days he hurts so badly cannot motivate himself to get out of bed. He says that he sleeps restlessly, snores, does not wake feeling rested. Has been asked to get evaluated for sleep apnea in the past but has not done it. Diabetes, has not changed his insulin around in quite a while. Was seeing endocrine and he was switched to the more affordable insulins, NPH and regular insulin. He is currently taking  units twice daily and regular insulin with breakfast, dinner, nighttime. Sometimes he will wake quite a while after each meal before he takes his insulin. He takes \"0-20 units\". Says that he will take about 16 units if his blood sugar is 314 units if his blood sugars 250. This blood sugar is taken randomly as far as I can tell.   Either before or after the meal whenever he thinks about it.      PMHx:  Past Medical History:   Diagnosis Date    Chronic headaches 2007    Depression     Diabetes ([facility] [address]     Hypertension     Thyroid disease     hypothyroid    Unspecified sleep apnea     does not use CPAP       Meds:   Current Outpatient Prescriptions   Medication Sig Dispense Refill    sildenafil, antihypertensive, (REVATIO) 20 mg tablet Take 3 Tabs by mouth daily as needed. 30 Tab 2    sertraline (ZOLOFT) 100 mg tablet TAKE ONE TABLET BY MOUTH ONCE DAILY 30 Tab 11    gemfibrozil (LOPID) 600 mg tablet TAKE ONE TABLET BY MOUTH TWICE DAILY 60 Tab 11    butalbital-acetaminophen-caffeine (ESGIC) -40 mg per tablet Take 1 Tab by mouth every six (6) hours as needed for Pain. PLEASE HAVE PATIENT CALL FOR APPOINTMENT FOR FURTHER FILLS 15 Tab 5    amitriptyline (ELAVIL) 25 mg tablet Take 1 Tab by mouth nightly. 12.5 mg p.o. nightly for 1 week and then 25 mg p.o. nightly 30 Tab 5    busPIRone (BUSPAR) 15 mg tablet TAKE ONE TABLET BY MOUTH TWICE DAILY 60 Tab 3    cyclobenzaprine (FLEXERIL) 10 mg tablet Take 1 Tab by mouth three (3) times daily (with meals). Indications: MUSCLE SPASM 20 Tab 0    insulin NPH (NOVOLIN N, HUMULIN N) 100 unit/mL injection 100 units twice daily 1 Vial 12    insulin regular (NOVOLIN R, HUMULIN R) 100 unit/mL injection 15-20 units TID AC (Patient taking differently: 15-20 units TID AC  Indications: 0-20u BID, per pt) 1 Vial 12    levothyroxine (SYNTHROID) 112 mcg tablet Take 1 Tab by mouth Daily (before breakfast). 90 Tab 3    lisinopril (PRINIVIL, ZESTRIL) 10 mg tablet Take 1 Tab by mouth daily. (Patient taking differently: Take 20 mg by mouth daily.) 90 Tab 3    HYDROcodone-acetaminophen (NORCO) 5-325 mg per tablet Take 1-2 Tabs by mouth every six (6) hours as needed for Pain. Max Daily Amount: 8 Tabs. 20 Tab 0    naproxen (NAPROSYN) 500 mg tablet Take 1 Tab by mouth every twelve (12) hours as needed for Pain.  20 Tab 0    HUMALOG 100 unit/mL injection Allergies: Allergies   Allergen Reactions    Demerol [Meperidine] Hives    Penicillin G Swelling    Percocet [Oxycodone-Acetaminophen] Hives and Nausea and Vomiting    Sulfa (Sulfonamide Antibiotics) Swelling       Smoker:  History   Smoking Status    Former Smoker    Packs/day: 0.00    Years: 14.00    Quit date: 1/1/2014   Smokeless Tobacco    Never Used       ETOH:   History   Alcohol Use No       FH:   Family History   Problem Relation Age of Onset    Diabetes Mother     Heart Disease Father     Cancer Father     Diabetes Father     Diabetes Paternal Aunt        ROS:   As listed in HPI. In addition:  Constitutional:   No headache, fever, fatigue, weight loss or weight gain      Cardiac:    No chest pain      Resp:   No cough or shortness of breath      Neuro   No loss of consciousness, dizziness, seizures      Physical Exam:  Blood pressure 133/84, pulse (!) 104, temperature 98 °F (36.7 °C), resp. rate 16, height 5' 9\" (1.753 m), weight 256 lb (116.1 kg), SpO2 97 %. GEN: No apparent distress. Alert and oriented and responds to all questions appropriately. NEUROLOGIC:  No focal neurologic deficits. Strength and sensation grossly intact. Coordination and gait grossly intact. EXT: Well perfused. No edema. SKIN: No obvious rashes. Assessment and Plan     Poorly controlled diabetes. A1c 10.6% (POC)  Microalbuminuria-on ACE  Not on statin  Does not have a good idea of how to use insulin. Tried to provide a lot of education today. Roslyn instructions:    units with breakfast and dinner  Regular insulin 10U with breakfast and dinner (lunch if you eat that day)  Check your blood sugar before breakfast and dinner (lunch if you eat that day)  Record these values and bring them to an appointment in 2 weeks. I strongly suspect that we will be coming down on the NPH and going up on the regular insulin.   He might be able to handle sliding scale with education but what he is doing right now is not a sliding scale    Why is he not taking metformin? Hypertriglyceridemia  Gemfibrozil, check a lipid panel    Hypothyroid  TSH    Back pain, neck pain, headache, fibromyalgia  Related? Did not fully address today. Did however talk about the evils of chronic opiate use as he has been getting occasional prescriptions for this. Strongly suspect sleep apnea  Stop bang score 7/8  [place] Sleepiness Scale 22 which is very high  Refer for sleep study    Erectile dysfunction  New problem over the last few months. Probably related to the above chronic medical issues. Use this as a talking point to get him to do a better job. Revatio    Follow-up 2 weeks      ICD-10-CM ICD-9-CM    1. Type 2 diabetes mellitus with complication, with long-term current use of insulin (HCC) E11.8 250.90 AMB POC HEMOGLOBIN A1C    Z79.4 V58.67 HEMOGLOBIN A1C WITH EAG   2. Hypertriglyceridemia E78.1 272.1 LIPID PANEL   3. Acquired hypothyroidism E03.9 244.9 TSH 3RD GENERATION   4. Fibromyalgia M79.7 729.1    5. Microalbuminuria R80.9 791.0 AMB POC URINE, MICROALBUMIN, SEMIQUANT (3 RESULTS)   6. Essential hypertension H96 628.9 METABOLIC PANEL, COMPREHENSIVE      CBC WITH AUTOMATED DIFF   7. Sleep apnea, unspecified type G47.30 780.57 SLEEP MEDICINE REFERRAL   8. Erectile dysfunction, unspecified erectile dysfunction type N52.9 607.84 sildenafil, antihypertensive, (REVATIO) 20 mg tablet   9. Encounter for immunization Z23 V03.89 TETANUS, DIPHTHERIA TOXOIDS AND ACELLULAR PERTUSSIS VACCINE (TDAP), IN INDIVIDS. >=7, IM      MA IMMUNIZ ADMIN,1 SINGLE/COMB VAC/TOXOID       AVS given. Pt expressed understanding of instructions    This was a 50 minute visit. Greater than 50% of the time was spent counseling the patient on above acute and chronic medical issues.

## 2018-02-19 NOTE — PATIENT INSTRUCTIONS
Vaccine Information Statement     Tdap (Tetanus, Diphtheria, Pertussis) Vaccine: What You Need to Know    Many Vaccine Information Statements are available in [place] and other languages. See www.immunize.org/vis. Hojas de Información Sobre Vacunas están disponibles en español y en muchos otros idiomas. Visite VanessaScale.[email or web address]    1. Why get vaccinated? Tetanus, diphtheria, and pertussis are very serious diseases. Tdap vaccine can protect us from these diseases. And, Tdap vaccine given to pregnant women can protect  babies against pertussis. TETANUS (Lockjaw) is rare in the [facility] today. It causes painful muscle tightening and stiffness, usually all over the body.  It can lead to tightening of muscles in the head and neck so you cant open your mouth, swallow, or sometimes even breathe. Tetanus kills about 1 out of 10 people who are infected even after receiving the best medical care. DIPHTHERIA is also rare in the [facility] today. It can cause a thick coating to form in the back of the throat.  It can lead to breathing problems, heart failure, paralysis, and death. PERTUSSIS (Whooping Cough) causes severe coughing spells, which can cause difficulty breathing, vomiting, and disturbed sleep.  It can also lead to weight loss, incontinence, and rib fractures. Up to 2 in 100 adolescents and 5 in 100 adults with pertussis are hospitalized or have complications, which could include pneumonia or death. These diseases are caused by bacteria. Diphtheria and pertussis are spread from person to person through secretions from coughing or sneezing. Tetanus enters the body through cuts, scratches, or wounds. Before vaccines, as many as 200,000 cases of diphtheria, 200,000 cases of pertussis, and hundreds of cases of tetanus, were reported in the [place] each year.  Since vaccination began, reports of cases for tetanus and diphtheria have dropped by about 99% and for pertussis by about 80%. 2. Tdap vaccine    Tdap vaccine can protect adolescents and adults from tetanus, diphtheria, and pertussis. One dose of Tdap is routinely given at age 6 or 15. People who did not get Tdap at that age should get it as soon as possible. Tdap is especially important for health care professionals and anyone having close contact with a baby younger than 12 months. Pregnant women should get a dose of Tdap during every pregnancy, to protect the  from pertussis. Infants are most at risk for severe, life-threatening complications from pertussis. Another vaccine, called Td, protects against tetanus and diphtheria, but not pertussis. A Td booster should be given every 10 years. Tdap may be given as one of these boosters if you have never gotten Tdap before. Tdap may also be given after a severe cut or burn to prevent tetanus infection. Your doctor or the person giving you the vaccine can give you more information. Tdap may safely be given at the same time as other vaccines. 3. Some people should not get this vaccine     A person who has ever had a life-threatening allergic reaction after a previous dose of any diphtheria, tetanus or pertussis containing vaccine, OR has a severe allergy to any part of this vaccine, should not get Tdap vaccine. Tell the person giving the vaccine about any severe allergies.  Anyone who had coma or long repeated seizures within 7 days after a childhood dose of DTP or DTaP, or a previous dose of Tdap, should not get Tdap, unless a cause other than the vaccine was found. They can still get Td.  Talk to your doctor if you:  - have seizures or another nervous system problem,  - had severe pain or swelling after any vaccine containing diphtheria, tetanus or pertussis,   - ever had a condition called Guillain Barré Syndrome (GBS),  - arent feeling well on the day the shot is scheduled.     4. Risks    With any medicine, including vaccines, there is a chance of side effects. These are usually mild and go away on their own. Serious reactions are also possible but are rare. Most people who get Tdap vaccine do not have any problems with it. Mild Problems following Tdap  (Did not interfere with activities)   Pain where the shot was given (about 3 in 4 adolescents or 2 in 3 adults)   Redness or swelling where the shot was given (about 1 person in 5)   Mild fever of at least 100.4°F (up to about 1 in 25 adolescents or 1 in 100 adults)   Headache (about 3 or 4 people in 10)   Tiredness (about 1 person in 3 or 4)   Nausea, vomiting, diarrhea, stomach ache (up to 1 in 4 adolescents or 1 in 10 adults)   Chills,  sore joints (about 1 person in 10)   Body aches (about 1 person in 3 or 4)    Rash, swollen glands (uncommon)    Moderate Problems following Tdap  (Interfered with activities, but did not require medical attention)   Pain where the shot was given (up to 1 in 5 or 6)    Redness or swelling where the shot was given (up to about 1 in 16 adolescents or 1 in 12 adults)   Fever over 102°F (about 1 in 100 adolescents or 1 in 250 adults)   Headache (about 1 in 7 adolescents or 1 in 10 adults)   Nausea, vomiting, diarrhea, stomach ache (up to 1 or 3 people in 100)   Swelling of the entire arm where the shot was given (up to about 1 in 500). Severe Problems following Tdap  (Unable to perform usual activities; required medical attention)   Swelling, severe pain, bleeding, and redness in the arm where the shot was given (rare). Problems that could happen after any vaccine:     People sometimes faint after a medical procedure, including vaccination. Sitting or lying down for about 15 minutes can help prevent fainting, and injuries caused by a fall. Tell your doctor if you feel dizzy, or have vision changes or ringing in the ears.      Some people get severe pain in the shoulder and have difficulty moving the arm where a shot was given. This happens very rarely.  Any medication can cause a severe allergic reaction. Such reactions from a vaccine are very rare, estimated at fewer than 1 in a million doses, and would happen within a few minutes to a few hours after the vaccination. As with any medicine, there is a very remote chance of a vaccine causing a serious injury or death. The safety of vaccines is always being monitored. For more information, visit: www.cdc.gov/vaccinesafety/    5. What if there is a serious problem? What should I look for?  Look for anything that concerns you, such as signs of a severe allergic reaction, very high fever, or unusual behavior.  Signs of a severe allergic reaction can include hives, swelling of the face and throat, difficulty breathing, a fast heartbeat, dizziness, and weakness. These would usually start a few minutes to a few hours after the vaccination. What should I do?  If you think it is a severe allergic reaction or other emergency that cant wait, call 9-1-1 or get the person to the nearest hospital. Otherwise, call your doctor.  Afterward, the reaction should be reported to the Vaccine Adverse Event Reporting System (VAERS). Your doctor might file this report, or you can do it yourself through the VAERS web site at www.vaers. [facility].gov, or by calling 7-438.645.6260. VAERS does not give medical advice. 6. The National Vaccine Injury Compensation Program    The [facility] Vaccine Injury Compensation Program (VICP) is a federal program that was created to compensate people who may have been injured by certain vaccines. Persons who believe they may have been injured by a vaccine can learn about the program and about filing a claim by calling 6-183.992.1759 or visiting the [employer] website at www.[facility].gov/vaccinecompensation. There is a time limit to file a claim for compensation. 7. How can I learn more?  Ask your doctor.  He or she can give you the vaccine package insert or suggest other sources of information.  Call your local or state health department.  Contact the Centers for Disease Control and Prevention (CDC):  - Call 0-110.374.3038 (1-800-CDC-INFO) or  - Visit CDCs website at www.cdc.gov/vaccines      Vaccine Information Statement   Tdap Vaccine  (2/24/2015)  [address] Gerardo [id number]    Department of Health and Human Services  Centers for Disease Control and Prevention    Office Use Only

## 2018-02-19 NOTE — LETTER
NOTIFICATION RETURN TO WORK / SCHOOL 
 
2/19/2018 9:25 AM 
 
Mr. Doug Parker [place] [id number] To Whom It May Concern: 
 
Doug Parker is currently under the care of [address] [place] [address]. He was seen today, February 19, 2018. If there are questions or concerns please have the patient contact our office. Sincerely, Carlos Arias MD

## 2018-02-20 LAB
ALBUMIN SERPL-MCNC: 4.5 G/DL (ref 3.5–5.5)
ALBUMIN/GLOB SERPL: 1.7 {RATIO} (ref 1.2–2.2)
ALP SERPL-CCNC: 98 IU/L (ref 39–117)
ALT SERPL-CCNC: 29 IU/L (ref 0–44)
AST SERPL-CCNC: 14 IU/L (ref 0–40)
BASOPHILS # BLD AUTO: 0 X10E3/UL (ref 0–0.2)
BASOPHILS NFR BLD AUTO: 0 %
BILIRUB SERPL-MCNC: 0.3 MG/DL (ref 0–1.2)
BUN SERPL-MCNC: 20 MG/DL (ref 6–20)
BUN/CREAT SERPL: 20 (ref 9–20)
CALCIUM SERPL-MCNC: 9.6 MG/DL (ref 8.7–10.2)
CHLORIDE SERPL-SCNC: 92 MMOL/L (ref 96–106)
CHOLEST SERPL-MCNC: 248 MG/DL (ref 100–199)
CO2 SERPL-SCNC: 22 MMOL/L (ref 18–29)
CREAT SERPL-MCNC: 0.99 MG/DL (ref 0.76–1.27)
EOSINOPHIL # BLD AUTO: 0.2 X10E3/UL (ref 0–0.4)
EOSINOPHIL NFR BLD AUTO: 2 %
ERYTHROCYTE [DISTWIDTH] IN BLOOD BY AUTOMATED COUNT: 13.7 % (ref 12.3–15.4)
EST. AVERAGE GLUCOSE BLD GHB EST-MCNC: 266 MG/DL
GFR SERPLBLD CREATININE-BSD FMLA CKD-EPI: 110 ML/MIN/1.73
GFR SERPLBLD CREATININE-BSD FMLA CKD-EPI: 96 ML/MIN/1.73
GLOBULIN SER CALC-MCNC: 2.6 G/DL (ref 1.5–4.5)
GLUCOSE SERPL-MCNC: 360 MG/DL (ref 65–99)
HBA1C MFR BLD: 10.9 % (ref 4.8–5.6)
HCT VFR BLD AUTO: 45.2 % (ref 37.5–51)
HDLC SERPL-MCNC: 33 MG/DL
HGB BLD-MCNC: 15.3 G/DL (ref 13–17.7)
IMM GRANULOCYTES # BLD: 0.1 X10E3/UL (ref 0–0.1)
IMM GRANULOCYTES NFR BLD: 1 %
INTERPRETATION, 910389: NORMAL
LDLC SERPL CALC-MCNC: ABNORMAL MG/DL (ref 0–99)
LYMPHOCYTES # BLD AUTO: 3.2 X10E3/UL (ref 0.7–3.1)
LYMPHOCYTES NFR BLD AUTO: 36 %
Lab: NORMAL
MCH RBC QN AUTO: 29 PG (ref 26.6–33)
MCHC RBC AUTO-ENTMCNC: 33.8 G/DL (ref 31.5–35.7)
MCV RBC AUTO: 86 FL (ref 79–97)
MONOCYTES # BLD AUTO: 0.6 X10E3/UL (ref 0.1–0.9)
MONOCYTES NFR BLD AUTO: 7 %
NEUTROPHILS # BLD AUTO: 4.8 X10E3/UL (ref 1.4–7)
NEUTROPHILS NFR BLD AUTO: 54 %
PLATELET # BLD AUTO: 268 X10E3/UL (ref 150–379)
POTASSIUM SERPL-SCNC: 5 MMOL/L (ref 3.5–5.2)
PROT SERPL-MCNC: 7.1 G/DL (ref 6–8.5)
RBC # BLD AUTO: 5.28 X10E6/UL (ref 4.14–5.8)
SODIUM SERPL-SCNC: 134 MMOL/L (ref 134–144)
TRIGL SERPL-MCNC: 567 MG/DL (ref 0–149)
TSH SERPL DL<=0.005 MIU/L-ACNC: 3.01 UIU/ML (ref 0.45–4.5)
VLDLC SERPL CALC-MCNC: ABNORMAL MG/DL (ref 5–40)
WBC # BLD AUTO: 9 X10E3/UL (ref 3.4–10.8)

## 2018-02-22 ENCOUNTER — TELEPHONE (OUTPATIENT)
Dept: FAMILY MEDICINE CLINIC | Age: 38
End: 2018-02-22

## 2018-02-22 RX ORDER — PRAVASTATIN SODIUM 40 MG/1
40 TABLET ORAL
Qty: 90 TAB | Refills: 3 | Status: SHIPPED | OUTPATIENT
Start: 2018-02-22 | End: 2019-03-05 | Stop reason: SDUPTHER

## 2018-02-22 RX ORDER — FENOFIBRATE 145 MG/1
145 TABLET, COATED ORAL DAILY
Qty: 90 TAB | Refills: 3 | Status: SHIPPED | OUTPATIENT
Start: 2018-02-22 | End: 2018-02-27

## 2018-02-27 ENCOUNTER — TELEPHONE (OUTPATIENT)
Dept: FAMILY MEDICINE CLINIC | Age: 38
End: 2018-02-27

## 2018-02-27 RX ORDER — FENOFIBRATE 160 MG/1
160 TABLET ORAL DAILY
Qty: 90 TAB | Refills: 3 | Status: SHIPPED | OUTPATIENT
Start: 2018-02-27 | End: 2019-03-23 | Stop reason: SDUPTHER

## 2018-02-27 NOTE — TELEPHONE ENCOUNTER
Notified by pharmacy that insurance would prefer fenofibrate 54 mg or 160 mg.   We will go with 160 mg

## 2018-03-05 ENCOUNTER — OFFICE VISIT (OUTPATIENT)
Dept: FAMILY MEDICINE CLINIC | Age: 38
End: 2018-03-05

## 2018-03-05 VITALS
WEIGHT: 258 LBS | HEIGHT: 69 IN | HEART RATE: 103 BPM | SYSTOLIC BLOOD PRESSURE: 130 MMHG | RESPIRATION RATE: 16 BRPM | OXYGEN SATURATION: 96 % | BODY MASS INDEX: 38.21 KG/M2 | TEMPERATURE: 98.8 F | DIASTOLIC BLOOD PRESSURE: 88 MMHG

## 2018-03-05 DIAGNOSIS — I10 ESSENTIAL HYPERTENSION: Chronic | ICD-10-CM

## 2018-03-05 DIAGNOSIS — Z79.4 TYPE 2 DIABETES MELLITUS WITH COMPLICATION, WITH LONG-TERM CURRENT USE OF INSULIN (HCC): Primary | Chronic | ICD-10-CM

## 2018-03-05 DIAGNOSIS — E11.8 TYPE 2 DIABETES MELLITUS WITH COMPLICATION, WITH LONG-TERM CURRENT USE OF INSULIN (HCC): Primary | Chronic | ICD-10-CM

## 2018-03-05 DIAGNOSIS — E03.9 ACQUIRED HYPOTHYROIDISM: ICD-10-CM

## 2018-03-05 DIAGNOSIS — E78.1 HYPERTRIGLYCERIDEMIA: ICD-10-CM

## 2018-03-05 LAB
ALBUMIN UR QL STRIP: 150 MG/L
CREATININE, URINE POC: 200 MG/DL
MICROALBUMIN/CREAT RATIO POC: NORMAL MG/G

## 2018-03-05 RX ORDER — METFORMIN HYDROCHLORIDE 500 MG/1
500 TABLET ORAL 2 TIMES DAILY WITH MEALS
Qty: 180 TAB | Refills: 3 | Status: SHIPPED | OUTPATIENT
Start: 2018-03-05 | End: 2019-06-20 | Stop reason: SDUPTHER

## 2018-03-05 NOTE — LETTER
NOTIFICATION RETURN TO WORK / SCHOOL 
 
3/5/2018 8:45 AM 
 
Mr. Reyes Fendt [phone] [id number] To Whom It May Concern: 
 
Reyes Fendt is currently under the care of [address] [place] [address]. He was seen in our office on 3/5/18. If there are questions or concerns please have the patient contact our office. Sincerely, Santi Gregory MD

## 2018-03-05 NOTE — PROGRESS NOTES
.  Chief Complaint   Patient presents with    Diabetes     1. Have you been to the ER, urgent care clinic since your last visit? Hospitalized since your last visit? No    2. Have you seen or consulted any other health care providers outside of the [address] [place] [address] since your last visit? Include any pap smears or colon screening. NO  \  . True Morgan

## 2018-03-05 NOTE — PROGRESS NOTES
FERNANDO Rivas is a 43 y.o. male who presents for 2 week follow-up on his blood sugar. A1c is quite high, 10.6% POC when we checked 2 weeks ago. Was taking his insulin haphazardly. After our last discussion he is now taking  units with breakfast and dinner, regular insulin 10 units with breakfast and dinner and lunch if he eats at that day. he has been checking his fasting blood sugar pre-lunch blood sugar and pre-dinner, as well as some nighttime sugars. Brought this log in today. I noticed that his fasting blood sugars are consistently in the 300s. This is regardless of what his evening blood sugar is (impressively his bedtime sugar 1 day was 118 and was 330 the next morning). 10 units of insulin covers some meals better than others but is consistently not enough to cover for dinner and to a lesser extent breakfast.  Seems to cover lunch pretty well. We talked about his breakfast which are really only to breakfast to choose from. He will either have biscuits, gravy, hashbrowns or a sausage egg and cheese biscuit with hashbrowns Loza's. It is the biscuits and gravy morning that is not being covered by the insulin      Was seeing endocrinology but has not followed up in quite a while. Was seen Dr. Darlene Hines. I referred him to [facility] endocrinology but he never went. Tells me that he has type 1 diabetes and was diagnosed when he was 6    PMHx:  Past Medical History:   Diagnosis Date    Chronic headaches 2007    Depression     Diabetes ([facility] [address]     Hypertension     Thyroid disease     hypothyroid    Unspecified sleep apnea     does not use CPAP       Meds:   Current Outpatient Prescriptions   Medication Sig Dispense Refill    metFORMIN (GLUCOPHAGE) 500 mg tablet Take 1 Tab by mouth two (2) times daily (with meals). 180 Tab 3    fenofibrate (LOFIBRA) 160 mg tablet Take 1 Tab by mouth daily. 90 Tab 3    pravastatin (PRAVACHOL) 40 mg tablet Take 1 Tab by mouth nightly.  90 Tab 3    sildenafil, antihypertensive, (REVATIO) 20 mg tablet Take 3 Tabs by mouth daily as needed. 30 Tab 2    sertraline (ZOLOFT) 100 mg tablet TAKE ONE TABLET BY MOUTH ONCE DAILY 30 Tab 11    butalbital-acetaminophen-caffeine (ESGIC) -40 mg per tablet Take 1 Tab by mouth every six (6) hours as needed for Pain. PLEASE HAVE PATIENT CALL FOR APPOINTMENT FOR FURTHER FILLS 15 Tab 5    amitriptyline (ELAVIL) 25 mg tablet Take 1 Tab by mouth nightly. 12.5 mg p.o. nightly for 1 week and then 25 mg p.o. nightly 30 Tab 5    busPIRone (BUSPAR) 15 mg tablet TAKE ONE TABLET BY MOUTH TWICE DAILY 60 Tab 3    cyclobenzaprine (FLEXERIL) 10 mg tablet Take 1 Tab by mouth three (3) times daily (with meals). Indications: MUSCLE SPASM 20 Tab 0    naproxen (NAPROSYN) 500 mg tablet Take 1 Tab by mouth every twelve (12) hours as needed for Pain. 20 Tab 0    HUMALOG 100 unit/mL injection       insulin NPH (NOVOLIN N, HUMULIN N) 100 unit/mL injection 100 units twice daily 1 Vial 12    insulin regular (NOVOLIN R, HUMULIN R) 100 unit/mL injection 15-20 units TID AC (Patient taking differently: 15-20 units TID AC  Indications: 0-20u BID, per pt) 1 Vial 12    levothyroxine (SYNTHROID) 112 mcg tablet Take 1 Tab by mouth Daily (before breakfast). 90 Tab 3    lisinopril (PRINIVIL, ZESTRIL) 10 mg tablet Take 1 Tab by mouth daily. (Patient taking differently: Take 20 mg by mouth daily.) 90 Tab 3    HYDROcodone-acetaminophen (NORCO) 5-325 mg per tablet Take 1-2 Tabs by mouth every six (6) hours as needed for Pain. Max Daily Amount: 8 Tabs. 20 Tab 0       Allergies:    Allergies   Allergen Reactions    Demerol [Meperidine] Hives    Penicillin G Swelling    Percocet [Oxycodone-Acetaminophen] Hives and Nausea and Vomiting    Sulfa (Sulfonamide Antibiotics) Swelling       Smoker:  History   Smoking Status    Former Smoker    Packs/day: 0.00    Years: 14.00    Quit date: 1/1/2014   Smokeless Tobacco    Never Used       ETOH: History   Alcohol Use No       FH:   Family History   Problem Relation Age of Onset    Diabetes Mother     Heart Disease Father     Cancer Father     Diabetes Father     Diabetes Paternal Aunt        ROS:   As listed in HPI. In addition:  Constitutional:   No headache, fever, fatigue, weight loss or weight gain      Cardiac:    No chest pain      Resp:   No cough or shortness of breath      Neuro   No loss of consciousness, dizziness, seizures      Physical Exam:  Blood pressure 130/88, pulse (!) 103, temperature 98.8 °F (37.1 °C), resp. rate 16, height 5' 9\" (1.753 m), weight 258 lb (117 kg), SpO2 96 %. GEN: No apparent distress. Alert and oriented and responds to all questions appropriately. NEUROLOGIC:  No focal neurologic deficits. Strength and sensation grossly intact. Coordination and gait grossly intact. EXT: Well perfused. No edema. SKIN: No obvious rashes. Assessment and Plan     Diabetes-type I? Childhood diagnosis  Poorly controlled  Continue  units with breakfast and dinner  Regular insulin 10 units with breakfast (and lunch if you eat that day) and increased to 14 units with dinner  Add metformin 500 mg twice daily because of his insulin resistance. This has been studied and is promising  In type I diabetics and has been shown to decrease blood sugars although there has not been a significant improvement in A1c in these studies    Keep a close eye on blood sugar. Make notes of foods that you are eating that are increasing her blood sugar as well as foods that you are eating that are maintaining good blood sugar. Hypertension reasonably well-controlled    Hypertriglyceridemia  Tolerating statin and TriCor    Strongly suspect sleep apnea, did not follow-up on this, has he made appointment? I do not see anything in our system    Follow-up 2 weeks      ICD-10-CM ICD-9-CM    1.  Type 2 diabetes mellitus with complication, with long-term current use of insulin ([facility]) E11.8 250.90 AMB POC URINE, MICROALBUMIN, SEMIQUANT (3 RESULTS)    Z79.4 V58.67 metFORMIN (GLUCOPHAGE) 500 mg tablet   2. Essential hypertension I10 401.9    3. Hypertriglyceridemia E78.1 272.1    4. Acquired hypothyroidism E03.9 244.9        AVS given.  Pt expressed understanding of instructions

## 2018-03-06 ENCOUNTER — DOCUMENTATION ONLY (OUTPATIENT)
Dept: FAMILY MEDICINE CLINIC | Age: 38
End: 2018-03-06

## 2018-03-06 NOTE — PROGRESS NOTES
3/6/18 Called [facility] pharmacy due to request sent from office Fenofibrate 145 mg tabs, Key: [id number]. Devi tech stated Fenofibrate 160 mg ready for pickup.

## 2018-03-07 RX ORDER — LEVOTHYROXINE SODIUM 112 UG/1
TABLET ORAL
Qty: 90 TAB | Refills: 3 | Status: SHIPPED | OUTPATIENT
Start: 2018-03-07 | End: 2019-04-27 | Stop reason: SDUPTHER

## 2018-03-07 RX ORDER — LISINOPRIL 10 MG/1
TABLET ORAL
Qty: 90 TAB | Refills: 3 | Status: SHIPPED | OUTPATIENT
Start: 2018-03-07 | End: 2019-04-27 | Stop reason: SDUPTHER

## 2018-03-20 ENCOUNTER — HOSPITAL ENCOUNTER (EMERGENCY)
Age: 38
Discharge: HOME | End: 2018-03-20
Attending: EMERGENCY MEDICINE
Payer: COMMERCIAL

## 2018-03-20 VITALS
SYSTOLIC BLOOD PRESSURE: 147 MMHG | RESPIRATION RATE: 16 BRPM | HEIGHT: 69 IN | TEMPERATURE: 98.4 F | DIASTOLIC BLOOD PRESSURE: 95 MMHG | OXYGEN SATURATION: 99 % | HEART RATE: 98 BPM | WEIGHT: 259.26 LBS | BODY MASS INDEX: 38.4 KG/M2

## 2018-03-20 DIAGNOSIS — M54.2 CHRONIC NECK PAIN: ICD-10-CM

## 2018-03-20 DIAGNOSIS — Z86.59 HISTORY OF DEPRESSION: ICD-10-CM

## 2018-03-20 DIAGNOSIS — G89.29 CHRONIC NECK PAIN: ICD-10-CM

## 2018-03-20 DIAGNOSIS — M79.7 FIBROMYALGIA: ICD-10-CM

## 2018-03-20 DIAGNOSIS — G44.86 CERVICOGENIC HEADACHE: Primary | ICD-10-CM

## 2018-03-20 PROCEDURE — 74011250637 HC RX REV CODE- 250/637: Performed by: PHYSICIAN ASSISTANT

## 2018-03-20 PROCEDURE — 99283 EMERGENCY DEPT VISIT LOW MDM: CPT

## 2018-03-20 RX ORDER — HYDROCODONE BITARTRATE AND ACETAMINOPHEN 5; 325 MG/1; MG/1
1 TABLET ORAL
Qty: 6 TAB | Refills: 0 | Status: SHIPPED | OUTPATIENT
Start: 2018-03-20 | End: 2018-03-22

## 2018-03-20 RX ORDER — HYDROCODONE BITARTRATE AND ACETAMINOPHEN 5; 325 MG/1; MG/1
1 TABLET ORAL
Status: COMPLETED | OUTPATIENT
Start: 2018-03-20 | End: 2018-03-20

## 2018-03-20 RX ADMIN — HYDROCODONE BITARTRATE AND ACETAMINOPHEN 1 TABLET: 5; 325 TABLET ORAL at 19:01

## 2018-03-20 NOTE — ED PROVIDER NOTES
EMERGENCY DEPARTMENT HISTORY AND PHYSICAL EXAM      Date: 3/20/2018  Patient Name: Jane Emmanuel    History of Presenting Illness     Chief Complaint   Patient presents with    Neck Pain     ambulatory to triage with mother, hx of bulging disk in neck, started Saturday while grocery shopping, denies lifting heacy objects and injury/ trauma    Back Pain     upper, right arm pain started on the way to the ED    Migraine     hx of migraines, blurred vision on Sat which is normal with migraines, denies N/V       History Provided By: Patient    HPI: Jane Emmanuel, 43 y.o. male with PMHx significant for HTN, diabetes, sleep apnea, chronic neck pain due to cervical disc bulge, and migraines, presents ambulatory to the ED with cc of intermittent episodes of a right-sided headache x 4 days. He states that his headache fluctuates in severity. He also c/o associated blurred vision, photophobia, and phonophobia x 4 days. He states that his symptoms are similar to prior migraines. He reports being followed by Neurology, and he has been prescribed Fioricet, Flexeril, and Elavil with no relief. He denies a history of Botox treatments. He notes that his most recent Neurology follow up was 1 month ago. He also c/o exacerbation of his chronic neck pain that intermittently radiates down his right elbow. He states that he is scheduled for an acupuncture procedure in 4 days. He specifically denies fevers, ear pain, or other complaints at this time. There are no other complaints, changes, or physical findings at this time. PCP: Gali Bansal MD   Neurology: Cherelle Hernandez MD  Orthopedic Surgery: Marco A Tariq MD    Current Outpatient Prescriptions   Medication Sig Dispense Refill    HYDROcodone-acetaminophen (NORCO) 5-325 mg per tablet Take 1 Tab by mouth every eight (8) hours as needed for Pain for up to 2 days. Max Daily Amount: 3 Tabs.  6 Tab 0    levothyroxine (SYNTHROID) 112 mcg tablet TAKE ONE TABLET BY MOUTH ONCE DAILY BEFORE BREAKFAST 90 Tab 3    lisinopril (PRINIVIL, ZESTRIL) 10 mg tablet TAKE ONE TABLET BY MOUTH ONCE DAILY 90 Tab 3    metFORMIN (GLUCOPHAGE) 500 mg tablet Take 1 Tab by mouth two (2) times daily (with meals). 180 Tab 3    fenofibrate (LOFIBRA) 160 mg tablet Take 1 Tab by mouth daily. 90 Tab 3    pravastatin (PRAVACHOL) 40 mg tablet Take 1 Tab by mouth nightly. 90 Tab 3    butalbital-acetaminophen-caffeine (ESGIC) -40 mg per tablet Take 1 Tab by mouth every six (6) hours as needed for Pain. PLEASE HAVE PATIENT CALL FOR APPOINTMENT FOR FURTHER FILLS 15 Tab 5    amitriptyline (ELAVIL) 25 mg tablet Take 1 Tab by mouth nightly. 12.5 mg p.o. nightly for 1 week and then 25 mg p.o. nightly 30 Tab 5    busPIRone (BUSPAR) 15 mg tablet TAKE ONE TABLET BY MOUTH TWICE DAILY 60 Tab 3    cyclobenzaprine (FLEXERIL) 10 mg tablet Take 1 Tab by mouth three (3) times daily (with meals). Indications:  MUSCLE SPASM 20 Tab 0    insulin NPH (NOVOLIN N, HUMULIN N) 100 unit/mL injection 100 units twice daily 1 Vial 12    insulin regular (NOVOLIN R, HUMULIN R) 100 unit/mL injection 15-20 units TID AC (Patient taking differently: 15-20 units TID AC  Indications: 0-20u BID, per pt) 1 Vial 12    sertraline (ZOLOFT) 100 mg tablet TAKE ONE TABLET BY MOUTH ONCE DAILY 30 Tab 11       Past History     Past Medical History:  Past Medical History:   Diagnosis Date    Chronic headaches 2007    Depression     Diabetes ([facility] [address]     Hypertension     Thyroid disease     hypothyroid    Unspecified sleep apnea     does not use CPAP       Past Surgical History:  Past Surgical History:   Procedure Laterality Date    HX CHOLECYSTECTOMY  8/26/14    Dr Pati Wilkins    HX HEENT      wisdom teeth removed    HX ORTHOPAEDIC      carpel tunnel       Family History:  Family History   Problem Relation Age of Onset    Diabetes Mother     Heart Disease Father     Cancer Father     Diabetes Father     Diabetes Paternal Aunt Social History:  Social History   Substance Use Topics    Smoking status: Former Smoker     Packs/day: 0.00     Years: 14.00     Quit date: 1/1/2014    Smokeless tobacco: Never Used    Alcohol use No       Allergies: Allergies   Allergen Reactions    Demerol [Meperidine] Hives    Penicillin G Swelling    Percocet [Oxycodone-Acetaminophen] Hives and Nausea and Vomiting    Sulfa (Sulfonamide Antibiotics) Swelling       Review of Systems   Review of Systems   Constitutional: Negative. Negative for activity change, appetite change, chills, diaphoresis, fever and unexpected weight change. HENT: Negative for congestion, hearing loss, rhinorrhea, sinus pressure, sneezing, sore throat and trouble swallowing. Eyes: Positive for photophobia and visual disturbance. Negative for pain, redness and itching. Respiratory: Negative for cough, shortness of breath and wheezing. Cardiovascular: Negative for chest pain, palpitations and leg swelling. Gastrointestinal: Negative for abdominal pain, constipation, diarrhea, nausea and vomiting. Genitourinary: Negative for dysuria. Musculoskeletal: Positive for neck pain. Negative for arthralgias, gait problem and myalgias. Skin: Negative for color change, pallor, rash and wound. Neurological: Positive for headaches. Negative for tremors, weakness, light-headedness and numbness. Positive for phonophobia   All other systems reviewed and are negative. Physical Exam   Physical Exam   Constitutional: He is oriented to person, place, and time. Vital signs are normal. No distress. 43 y.o. male in NAD, pt has elevated BMI  Communicates appropriately and in full sentences  Normal vital signs   HENT:   Head: Normocephalic and atraumatic. Eyes: Conjunctivae are normal. Pupils are equal, round, and reactive to light. Right eye exhibits no discharge. Left eye exhibits no discharge. Neck: Normal range of motion. Neck supple.    No nuchal rigidity  No meningeal signs  Full AROM of cervical spine without elicited pain   Cardiovascular: Normal rate, regular rhythm and intact distal pulses. Pulmonary/Chest: Effort normal and breath sounds normal. No respiratory distress. He has no wheezes. Abdominal: Soft. Bowel sounds are normal. He exhibits no distension. There is no tenderness. Musculoskeletal: Normal range of motion. He exhibits no edema, tenderness or deformity. No neurologic, motor, vascular, or compartment embarrassment observed on exam. No focal neurologic deficits. Neurological: He is alert and oriented to person, place, and time. Coordination normal.   No focal neuro deficits. NVI. Neurologically intact of UE and LE B/L  Sensation intact and symmetrical of UE and LE B/L. Strength 5/5 of UE B/L, Strength 5/5 of LE B/L. Symmetric bulk and tone of LE muscle groups. GCS 15. Skin: Skin is warm and dry. No rash noted. He is not diaphoretic. No erythema. No pallor. Psychiatric: He has a normal mood and affect. Nursing note and vitals reviewed. Medical Decision Making   I am the first provider for this patient. I reviewed the vital signs, available nursing notes, past medical history, past surgical history, family history and social history. Vital Signs-Reviewed the patient's vital signs. Patient Vitals for the past 12 hrs:   Temp Pulse Resp BP SpO2   03/20/18 1839 98.4 °F (36.9 °C) 98 16 (!) 147/95 99 %       Records Reviewed: Nursing Notes, Old Medical Records and Previous Radiology Studies    Provider Notes (Medical Decision Making):   DDx: Tension headache, cluster headache, migraine, DDD, DJD, cervical radiculopathy, cervicogenic headache, fibromyalgia. ED Course:   Initial assessment performed. The patients presenting problems have been discussed, and they are in agreement with the care plan formulated and outlined with them. I have encouraged them to ask questions as they arise throughout their visit.     Progress Note:  7:01 PM  The patient's  was reviewed. Pt has not filled controlled substance since 10/2017. Written by DENNIS Schneider, as dictated by Aimee Galan PA-C. Progress Note:  7:08 PM  At time of discharge, the patient was counseled on making appropriate lifestyle changes such as incorporating healthier diet choices and wearing his CPAP appropriately. The patient states that he has a history of sleep apnea, but he declines wearing his CPAP. He was informed that proper use of a CPAP could alleviate his symptoms. The patient was also recommended to follow up with his Neurologist and Orthopedic Surgeon for closer symptoms management to ensure longer lasting relief. Pt conveys his understanding of this , and he is in agreement with the plan. Pt is stable for discharge. Written by DENNIS Schneider, as dictated by Aimee Galan PA-C. Critical Care Time: 0 minutes    Discharge Note:  7:10 PM  The pt is ready for discharge. The pt's signs, symptoms, diagnosis, and discharge instructions have been discussed and pt has conveyed their understanding. The pt is to follow up as recommended or return to ER should their symptoms worsen. Plan has been discussed and pt is in agreement. PLAN:  1. Discharge Medication List as of 3/20/2018  7:04 PM      START taking these medications    Details   HYDROcodone-acetaminophen (NORCO) 5-325 mg per tablet Take 1 Tab by mouth every eight (8) hours as needed for Pain for up to 2 days. Max Daily Amount: 3 Tabs., Print, Disp-6 Tab, R-0         CONTINUE these medications which have NOT CHANGED    Details   levothyroxine (SYNTHROID) 112 mcg tablet TAKE ONE TABLET BY MOUTH ONCE DAILY BEFORE BREAKFAST, Normal, Disp-90 Tab, R-3      lisinopril (PRINIVIL, ZESTRIL) 10 mg tablet TAKE ONE TABLET BY MOUTH ONCE DAILY, Normal, Disp-90 Tab, R-3      metFORMIN (GLUCOPHAGE) 500 mg tablet Take 1 Tab by mouth two (2) times daily (with meals). , Normal, Disp-180 Tab, R-3      fenofibrate (LOFIBRA) 160 mg tablet Take 1 Tab by mouth daily. , Normal, Disp-90 Tab, R-3      pravastatin (PRAVACHOL) 40 mg tablet Take 1 Tab by mouth nightly., Normal, Disp-90 Tab, R-3      butalbital-acetaminophen-caffeine (ESGIC) -40 mg per tablet Take 1 Tab by mouth every six (6) hours as needed for Pain. PLEASE HAVE PATIENT CALL FOR APPOINTMENT FOR FURTHER FILLS, Normal, Disp-15 Tab, R-5      amitriptyline (ELAVIL) 25 mg tablet Take 1 Tab by mouth nightly. 12.5 mg p.o. nightly for 1 week and then 25 mg p.o. nightly, Normal, Disp-30 Tab, R-5      busPIRone (BUSPAR) 15 mg tablet TAKE ONE TABLET BY MOUTH TWICE DAILY, NormalPlease consider 90 day supplies to promote better adherenceDisp-60 Tab, R-3      cyclobenzaprine (FLEXERIL) 10 mg tablet Take 1 Tab by mouth three (3) times daily (with meals). Indications: MUSCLE SPASM, Print, Disp-20 Tab, R-0      insulin NPH (NOVOLIN N, HUMULIN N) 100 unit/mL injection 100 units twice daily, No Print, Disp-1 Vial, R-12      insulin regular (NOVOLIN R, HUMULIN R) 100 unit/mL injection 15-20 units TID AC, No Print, Disp-1 Vial, R-12      sertraline (ZOLOFT) 100 mg tablet TAKE ONE TABLET BY MOUTH ONCE DAILY, NormalPlease consider 90 day supplies to promote better adherenceDisp-30 Tab, R-11           2.    Follow-up Information     Follow up With Details Comments Contact Info    Jorge Javed MD Schedule an appointment as soon as possible for a visit in 2 days As needed, If symptoms worsen, Possible further evaluation and treatment [address]  [address] [facility]  [phone]      [facility] EMERGENCY DEPT Go to As needed, If symptoms worsen [facility] [address] [place] Juan Ortiz MD Call today  [address] [place] [address]  [address]  [address] [phone]      Caro Raymundo MD Call today  [facility] [address]  [address] [address]  [phone]          Return to ED if worse     Diagnosis     Clinical Impression:   1. Cervicogenic headache    2. Fibromyalgia    3. Chronic neck pain    4. History of depression        Attestations: This note is prepared by Terrell Blair, acting as a Scribe for Coco Conner. Wil Tang PA-C: The scribe's documentation has been prepared under my direction and personally reviewed by me in its entirety. I confirm that the notes above accurately reflects all work, treatment, procedures, and medical decision making performed by me.    8:19 AM  I was personally available for consultation in the emergency department. I have reviewed the chart and agree with the documentation recorded by the [facility], including the assessment, treatment plan, and disposition. Orly Dela Cruz MD          This note will not be viewable in [address]

## 2018-03-20 NOTE — DISCHARGE INSTRUCTIONS
Learning About How to Have a Healthy Back  What causes back pain? Back pain is often caused by overuse, strain, or injury. For example, people often hurt their backs playing sports or working in the yard, being jolted in a car accident, or lifting something too heavy. Aging plays a part too. Your bones and muscles tend to lose strength as you age, which makes injury more likely. The spongy discs between the bones of the spine (vertebrae) may suffer from wear and tear and no longer provide enough cushion between the bones. A disc that bulges or breaks open (herniated disc) can press on nerves, causing back pain. In some people, back pain is the result of arthritis, broken vertebrae caused by bone loss (osteoporosis), illness, or a spine problem. Although most people have back pain at one time or another, there are steps you can take to make it less likely. How can you have a healthy back? Reduce stress on your back through good posture  Slumping or slouching alone may not cause low back pain. But after the back has been strained or injured, bad posture can make pain worse. · Sleep in a position that maintains your back's normal curves and on a mattress that feels comfortable. Sleep on your side with a pillow between your knees, or sleep on your back with a pillow under your knees. These positions can reduce strain on your back. · Stand and sit up straight. \"Good posture\" generally means your ears, shoulders, and hips are in a straight line. · If you must stand for a long time, put one foot on a stool, ledge, or box. Switch feet every now and then. · Sit in a chair that is low enough to let you place both feet flat on the floor with both knees nearly level with your hips. If your chair or desk is too high, use a footrest to raise your knees. Place a small pillow, a rolled-up towel, or a lumbar roll in the curve of your back if you need extra support.   · Try a kneeling chair, which helps tilt your hips forward. This takes pressure off your lower back. · Try sitting on an exercise ball. It can rock from side to side, which helps keep your back loose. · When driving, keep your knees nearly level with your hips. Sit straight, and drive with both hands on the steering wheel. Your arms should be in a slightly bent position. Reduce stress on your back through careful lifting  · Squat down, bending at the hips and knees only. If you need to, put one knee to the floor and extend your other knee in front of you, bent at a right angle (half kneeling). · Press your chest straight forward. This helps keep your upper back straight while keeping a slight arch in your low back. · Hold the load as close to your body as possible, at the level of your belly button (navel). · Use your feet to change direction, taking small steps. · Lead with your hips as you change direction. Keep your shoulders in line with your hips as you move. · Set down your load carefully, squatting with your knees and hips only. Exercise and stretch your back  · Do some exercise on most days of the week, if your doctor says it is okay. You can walk, run, swim, or cycle. · Stretch your back muscles. Here are a few exercises to try:  Deborah Haus on your back, and gently pull one bent knee to your chest. Put that foot back on the floor, and then pull the other knee to your chest.  ¨ Do pelvic tilts. Lie on your back with your knees bent. Tighten your stomach muscles. Pull your belly button (navel) in and up toward your ribs. You should feel like your back is pressing to the floor and your hips and pelvis are slightly lifting off the floor. Hold for 6 seconds while breathing smoothly. ¨ Sit with your back flat against a wall. · Keep your core muscles strong. The muscles of your back, belly (abdomen), and buttocks support your spine. ¨ Pull in your belly and imagine pulling your navel toward your spine. Hold this for 6 seconds, then relax.  Remember to keep breathing normally as you tense your muscles. ¨ Do curl-ups. Always do them with your knees bent. Keep your low back on the floor, and curl your shoulders toward your knees using a smooth, slow motion. Keep your arms folded across your chest. If this bothers your neck, try putting your hands behind your neck (not your head), with your elbows spread apart. ¨ Lie on your back with your knees bent and your feet flat on the floor. Tighten your belly muscles, and then push with your feet and raise your buttocks up a few inches. Hold this position 6 seconds as you continue to breathe normally, then lower yourself slowly to the floor. Repeat 8 to 12 times. ¨ If you like group exercise, try Pilates or yoga. These classes have poses that strengthen the core muscles. Lead a healthy lifestyle  · Stay at a healthy weight to avoid strain on your back. · Do not smoke. Smoking increases the risk of osteoporosis, which weakens the spine. If you need help quitting, talk to your doctor about stop-smoking programs and medicines. These can increase your chances of quitting for good. Where can you learn more? Go to http://ladariusThinknumpavel.info/. Enter L315 in the search box to learn more about \"Learning About How to Have a Healthy Back. \"  Current as of: March 21, 2017  Content Version: 11.4  © 1303-0129 [employer]. Care instructions adapted under license by [employer] (which disclaims liability or warranty for this information). If you have questions about a medical condition or this instruction, always ask your healthcare professional. [place] [address] any warranty or liability for your use of this information. Back Pain: Care Instructions  Your Care Instructions    Back pain has many possible causes. It is often related to problems with muscles and ligaments of the back. It may also be related to problems with the nerves, discs, or bones of the back.  Moving, lifting, standing, sitting, or sleeping in an awkward way can strain the back. Sometimes you don't notice the injury until later. Arthritis is another common cause of back pain. Although it may hurt a lot, back pain usually improves on its own within several weeks. Most people recover in 12 weeks or less. Using good home treatment and being careful not to stress your back can help you feel better sooner. Follow-up care is a key part of your treatment and safety. Be sure to make and go to all appointments, and call your doctor if you are having problems. It's also a good idea to know your test results and keep a list of the medicines you take. How can you care for yourself at home? · Sit or lie in positions that are most comfortable and reduce your pain. Try one of these positions when you lie down:  ¨ Lie on your back with your knees bent and supported by large pillows. ¨ Lie on the floor with your legs on the seat of a sofa or chair. Prachi Basset on your side with your knees and hips bent and a pillow between your legs. ¨ Lie on your stomach if it does not make pain worse. · Do not sit up in bed, and avoid soft couches and twisted positions. Bed rest can help relieve pain at first, but it delays healing. Avoid bed rest after the first day of back pain. · Change positions every 30 minutes. If you must sit for long periods of time, take breaks from sitting. Get up and walk around, or lie in a comfortable position. · Try using a heating pad on a low or medium setting for 15 to 20 minutes every 2 or 3 hours. Try a warm shower in place of one session with the heating pad. · You can also try an ice pack for 10 to 15 minutes every 2 to 3 hours. Put a thin cloth between the ice pack and your skin. · Take pain medicines exactly as directed. ¨ If the doctor gave you a prescription medicine for pain, take it as prescribed.   ¨ If you are not taking a prescription pain medicine, ask your doctor if you can take an over-the-counter medicine. · Take short walks several times a day. You can start with 5 to 10 minutes, 3 or 4 times a day, and work up to longer walks. Walk on level surfaces and avoid hills and stairs until your back is better. · Return to work and other activities as soon as you can. Continued rest without activity is usually not good for your back. · To prevent future back pain, do exercises to stretch and strengthen your back and stomach. Learn how to use good posture, safe lifting techniques, and proper body mechanics. When should you call for help? Call your doctor now or seek immediate medical care if:  ? · You have new or worsening numbness in your legs. ? · You have new or worsening weakness in your legs. (This could make it hard to stand up.)   ? · You lose control of your bladder or bowels. ? Watch closely for changes in your health, and be sure to contact your doctor if:  ? · Your pain gets worse. ? · You are not getting better after 2 weeks. Where can you learn more? Go to http://ladarius-pavel.info/. Enter J260 in the search box to learn more about \"Back Pain: Care Instructions. \"  Current as of: March 21, 2017  Content Version: 11.4  © 1406-3330 [employer]. Care instructions adapted under license by [employer] (which disclaims liability or warranty for this information). If you have questions about a medical condition or this instruction, always ask your healthcare professional. [place] [address] any warranty or liability for your use of this information. Learning About Relief for Back Pain  What is back tension and strain? Back strain happens when you overstretch, or pull, a muscle in your back. You may hurt your back in an accident or when you exercise or lift something. Most back pain will get better with rest and time.  You can take care of yourself at home to help your back heal.  What can you do first to relieve back pain? When you first feel back pain, try these steps:  · Walk. Take a short walk (10 to 20 minutes) on a level surface (no slopes, hills, or stairs) every 2 to 3 hours. Walk only distances you can manage without pain, especially leg pain. · Relax. Find a comfortable position for rest. Some people are comfortable on the floor or a medium-firm bed with a small pillow under their head and another under their knees. Some people prefer to lie on their side with a pillow between their knees. Don't stay in one position for too long. · Try heat or ice. Try using a heating pad on a low or medium setting, or take a warm shower, for 15 to 20 minutes every 2 to 3 hours. Or you can buy single-use heat wraps that last up to 8 hours. You can also try an ice pack for 10 to 15 minutes every 2 to 3 hours. You can use an ice pack or a bag of frozen vegetables wrapped in a thin towel. There is not strong evidence that either heat or ice will help, but you can try them to see if they help. You may also want to try switching between heat and cold. · Take pain medicine exactly as directed. ¨ If the doctor gave you a prescription medicine for pain, take it as prescribed. ¨ If you are not taking a prescription pain medicine, ask your doctor if you can take an over-the-counter medicine. What else can you do? · Stretch and exercise. Exercises that increase flexibility may relieve your pain and make it easier for your muscles to keep your spine in a good, neutral position. And don't forget to keep walking. · Do self-massage. You can use self-massage to unwind after work or school or to energize yourself in the morning. You can easily massage your feet, hands, or neck. Self-massage works best if you are in comfortable clothes and are sitting or lying in a comfortable position. Use oil or lotion to massage bare skin. · Reduce stress.  Back pain can lead to a vicious [place]: Distress about the pain tenses the muscles in your back, which in turn causes more pain. Learn how to relax your mind and your muscles to lower your stress. Where can you learn more? Go to http://ladarius-pavel.info/. Enter X691 in the search box to learn more about \"Learning About Relief for Back Pain. \"  Current as of: March 21, 2017  Content Version: 11.4  © 3546-4537 [employer]. Care instructions adapted under license by [employer] (which disclaims liability or warranty for this information). If you have questions about a medical condition or this instruction, always ask your healthcare professional. [place] [address] any warranty or liability for your use of this information. Neck Pain: Care Instructions  Your Care Instructions    You can have neck pain anywhere from the bottom of your head to the top of your shoulders. It can spread to the upper back or arms. Injuries, painting a ceiling, sleeping with your neck twisted, staying in one position for too long, and many other activities can cause neck pain. Most neck pain gets better with home care. Your doctor may recommend medicine to relieve pain or relax your muscles. He or she may suggest exercise and physical therapy to increase flexibility and relieve stress. You may need to wear a special (cervical) collar to support your neck for a day or two. Follow-up care is a key part of your treatment and safety. Be sure to make and go to all appointments, and call your doctor if you are having problems. It's also a good idea to know your test results and keep a list of the medicines you take. How can you care for yourself at home? · Try using a heating pad on a low or medium setting for 15 to 20 minutes every 2 or 3 hours. Try a warm shower in place of one session with the heating pad. · You can also try an ice pack for 10 to 15 minutes every 2 to 3 hours. Put a thin cloth between the ice and your skin.   · Take pain medicines exactly as directed. ¨ If the doctor gave you a prescription medicine for pain, take it as prescribed. ¨ If you are not taking a prescription pain medicine, ask your doctor if you can take an over-the-counter medicine. · If your doctor recommends a cervical collar, wear it exactly as directed. When should you call for help? Call your doctor now or seek immediate medical care if:  ? · You have new or worsening numbness in your arms, buttocks or legs. ? · You have new or worsening weakness in your arms or legs. (This could make it hard to stand up.)   ? · You lose control of your bladder or bowels. ? Watch closely for changes in your health, and be sure to contact your doctor if:  ? · Your neck pain is getting worse. ? · You are not getting better after 1 week. ? · You do not get better as expected. Where can you learn more? Go to http://ladarius-pavel.info/. Enter [id number] in the search box to learn more about \"Neck Pain: Care Instructions. \"  Current as of: March 21, 2017  Content Version: 11.4  © 1620-5850 [employer]. Care instructions adapted under license by [employer] (which disclaims liability or warranty for this information). If you have questions about a medical condition or this instruction, always ask your healthcare professional. [place] [address] any warranty or liability for your use of this information. Neck: Exercises  Your Care Instructions  Here are some examples of typical rehabilitation exercises for your condition. Start each exercise slowly. Ease off the exercise if you start to have pain. Your doctor or physical therapist will tell you when you can start these exercises and which ones will work best for you. How to do the exercises  Neck stretch    1. This stretch works best if you keep your shoulder down as you lean away from it.  To help you remember to do this, start by relaxing your shoulders and lightly holding on to your thighs or your chair. 2. Tilt your head toward your shoulder and hold for 15 to 30 seconds. Let the weight of your head stretch your muscles. 3. If you would like a little added stretch, use your hand to gently and steadily pull your head toward your shoulder. For example, keeping your right shoulder down, lean your head to the left. 4. Repeat 2 to 4 times toward each shoulder. Diagonal neck stretch    1. Turn your head slightly toward the direction you will be stretching, and tilt your head diagonally toward your chest and hold for 15 to 30 seconds. 2. If you would like a little added stretch, use your hand to gently and steadily pull your head forward on the diagonal.  3. Repeat 2 to 4 times toward each side. Dorsal glide stretch    The dorsal glide stretches the back of the neck. If you feel pain, do not glide so far back. Some people find this exercise easier to do while lying on their backs with an ice pack on the neck. 1. Sit or stand tall and look straight ahead. 2. Slowly tuck your chin as you glide your head backward over your body  3. Hold for a count of 6, and then relax for up to 10 seconds. 4. Repeat 8 to 12 times. Chest and shoulder stretch    1. Sit or stand tall and glide your head backward as in the dorsal glide stretch. 2. Raise both arms so that your hands are next to your ears. 3. Take a deep breath, and as you breathe out, lower your elbows down and behind your back. You will feel your shoulder blades slide down and together, and at the same time you will feel a stretch across your chest and the front of your shoulders. 4. Hold for about 6 seconds, and then relax for up to 10 seconds. 5. Repeat 8 to 12 times. Strengthening: Hands on head    1. Move your head backward, forward, and side to side against gentle pressure from your hands, holding each position for about 6 seconds. 2. Repeat 8 to 12 times. Follow-up care is a key part of your treatment and safety.  Be sure to make and go to all appointments, and call your doctor if you are having problems. It's also a good idea to know your test results and keep a list of the medicines you take. Where can you learn more? Go to http://ladarius-pavel.info/. Enter P975 in the search box to learn more about \"Neck: Exercises. \"  Current as of: March 21, 2017  Content Version: 11.4  © 2223-5183 [employer]. Care instructions adapted under license by [employer] (which disclaims liability or warranty for this information). If you have questions about a medical condition or this instruction, always ask your healthcare professional. [address] any warranty or liability for your use of this information.

## 2018-03-28 ENCOUNTER — DOCUMENTATION ONLY (OUTPATIENT)
Dept: SLEEP MEDICINE | Age: 38
End: 2018-03-28

## 2018-04-16 DIAGNOSIS — G43.009 MIGRAINE WITHOUT AURA AND WITHOUT STATUS MIGRAINOSUS, NOT INTRACTABLE: ICD-10-CM

## 2018-04-17 RX ORDER — BUTALBITAL, ACETAMINOPHEN AND CAFFEINE 50; 325; 40 MG/1; MG/1; MG/1
TABLET ORAL
Qty: 15 TAB | Refills: 5 | Status: SHIPPED | OUTPATIENT
Start: 2018-04-17 | End: 2018-06-27 | Stop reason: DRUGHIGH

## 2018-06-20 DIAGNOSIS — G43.009 MIGRAINE WITHOUT AURA AND WITHOUT STATUS MIGRAINOSUS, NOT INTRACTABLE: ICD-10-CM

## 2018-06-21 NOTE — TELEPHONE ENCOUNTER
Future Appointments  Date Time Provider Aileen Smith   7/18/2018 11:40 AM Anabela Garsia MD [address]                         Last Appointment My Department:  1/18/2018    Please advise of refill below.    Last was sent 4/17/18    Requested Prescriptions     Pending Prescriptions Disp Refills    butalbital-acetaminophen-caffeine (FIORICET, ESGIC) -40 mg per tablet [Pharmacy Med Name: BUTAL/ACETAMN/CF -40 TAB] 15 Tab 5     Sig: TAKE 1 TABLET BY MOUTH EVERY 6 HOURS AS NEEDED FOR PAIN

## 2018-06-22 RX ORDER — BUTALBITAL, ACETAMINOPHEN AND CAFFEINE 50; 325; 40 MG/1; MG/1; MG/1
TABLET ORAL
Qty: 15 TAB | Refills: 5 | OUTPATIENT
Start: 2018-06-22

## 2018-06-22 NOTE — TELEPHONE ENCOUNTER
I called the pharmacy and the last fill dates were as follows: 6/7/18, 5/26/18, and 5/18/18.   Too soon to fill as this is to last 1 month each

## 2018-06-26 ENCOUNTER — TELEPHONE (OUTPATIENT)
Dept: NEUROLOGY | Age: 38
End: 2018-06-26

## 2018-06-26 NOTE — TELEPHONE ENCOUNTER
I called patient and advised I will need to speak with Dr Penelope Hernandez about his medication and call him back tomorrow.     When I called the pharmacy on 6/22/18, his fill dates are as follows:  6/7/18, 5/26/18, 5/18/18 and are for 15    Please advise if these were to be for 30 days

## 2018-06-26 NOTE — TELEPHONE ENCOUNTER
----- Message from [employer] sent at 6/26/2018  2:56 PM EDT -----  Regarding: Dr. Licona Or  Pt requested refill on his Rx(Fioricet) called to [facility] DR ARACELI SANCHEZ in [place]. Pt stated there are no refills left, and is out of medication. Best contact number [phone].

## 2018-06-27 DIAGNOSIS — G43.009 MIGRAINE WITHOUT AURA AND WITHOUT STATUS MIGRAINOSUS, NOT INTRACTABLE: Primary | ICD-10-CM

## 2018-06-27 RX ORDER — BUTALBITAL, ACETAMINOPHEN AND CAFFEINE 50; 325; 40 MG/1; MG/1; MG/1
TABLET ORAL
Qty: 20 TAB | Refills: 2 | Status: SHIPPED | OUTPATIENT
Start: 2018-06-27 | End: 2018-09-25 | Stop reason: SDUPTHER

## 2018-06-27 NOTE — TELEPHONE ENCOUNTER
I spoke with the patient ans notified of instructions of use of fioricet and that it was sent to the pharmacy

## 2018-09-04 RX ORDER — BUSPIRONE HYDROCHLORIDE 15 MG/1
TABLET ORAL
Qty: 60 TAB | Refills: 3 | Status: SHIPPED | OUTPATIENT
Start: 2018-09-04 | End: 2019-05-31 | Stop reason: SDUPTHER

## 2018-09-25 DIAGNOSIS — G43.009 MIGRAINE WITHOUT AURA AND WITHOUT STATUS MIGRAINOSUS, NOT INTRACTABLE: ICD-10-CM

## 2018-09-25 RX ORDER — BUTALBITAL, ACETAMINOPHEN AND CAFFEINE 50; 325; 40 MG/1; MG/1; MG/1
TABLET ORAL
Qty: 20 TAB | Refills: 2 | Status: SHIPPED | OUTPATIENT
Start: 2018-09-25 | End: 2018-12-21 | Stop reason: SDUPTHER

## 2018-11-10 ENCOUNTER — HOSPITAL ENCOUNTER (EMERGENCY)
Age: 38
Discharge: HOME | End: 2018-11-10
Attending: EMERGENCY MEDICINE
Payer: COMMERCIAL

## 2018-11-10 ENCOUNTER — APPOINTMENT (OUTPATIENT)
Dept: GENERAL RADIOLOGY | Age: 38
End: 2018-11-10
Attending: NURSE PRACTITIONER
Payer: COMMERCIAL

## 2018-11-10 VITALS
HEIGHT: 69 IN | OXYGEN SATURATION: 99 % | DIASTOLIC BLOOD PRESSURE: 97 MMHG | SYSTOLIC BLOOD PRESSURE: 153 MMHG | TEMPERATURE: 98.2 F | HEART RATE: 97 BPM | RESPIRATION RATE: 12 BRPM | WEIGHT: 253.09 LBS | BODY MASS INDEX: 37.49 KG/M2

## 2018-11-10 DIAGNOSIS — M79.641 RIGHT HAND PAIN: Primary | ICD-10-CM

## 2018-11-10 PROCEDURE — 73130 X-RAY EXAM OF HAND: CPT

## 2018-11-10 PROCEDURE — 99282 EMERGENCY DEPT VISIT SF MDM: CPT

## 2018-11-10 RX ORDER — NAPROXEN 500 MG/1
500 TABLET ORAL
Qty: 20 TAB | Refills: 0 | Status: SHIPPED | OUTPATIENT
Start: 2018-11-10 | End: 2019-06-17

## 2018-11-10 RX ORDER — TRAMADOL HYDROCHLORIDE 50 MG/1
50 TABLET ORAL
Qty: 20 TAB | Refills: 0 | Status: SHIPPED | OUTPATIENT
Start: 2018-11-10 | End: 2018-12-12

## 2018-11-10 RX ORDER — DOXYCYCLINE HYCLATE 100 MG
100 TABLET ORAL 2 TIMES DAILY
Qty: 14 TAB | Refills: 0 | Status: SHIPPED | OUTPATIENT
Start: 2018-11-10 | End: 2018-11-17

## 2018-11-10 NOTE — ED NOTES
Discharge instructions given to patient by PA [facility]. Patient verbalized understanding of discharge instructions. Pt discharged without difficulty. Pt discharged in stable condition via ambulation, accompanied by wife.

## 2018-11-10 NOTE — DISCHARGE INSTRUCTIONS
Hand Pain: Care Instructions  Your Care Instructions    Common causes of hand pain are overuse and injuries, such as might happen during sports or home repair projects. Everyday wear and tear, especially as you get older, also can cause hand pain. Most minor hand injuries will heal on their own, and home treatment is usually all you need to do. If you have sudden and severe pain, you may need tests and treatment. Follow-up care is a key part of your treatment and safety. Be sure to make and go to all appointments, and call your doctor if you are having problems. It's also a good idea to know your test results and keep a list of the medicines you take. How can you care for yourself at home? · Take pain medicines exactly as directed. ? If the doctor gave you a prescription medicine for pain, take it as prescribed. ? If you are not taking a prescription pain medicine, ask your doctor if you can take an over-the-counter medicine. · Rest and protect your hand. Take a break from any activity that may cause pain. · Put ice or a cold pack on your hand for 10 to 20 minutes at a time. Put a thin cloth between the ice and your skin. · Prop up the sore hand on a pillow when you ice it or anytime you sit or lie down during the next 3 days. Try to keep it above the level of your heart. This will help reduce swelling. · If your doctor recommends a sling, splint, or elastic bandage to support your hand, wear it as directed. When should you call for help? Call 911 anytime you think you may need emergency care. For example, call if:    · Your hand turns cool or pale or changes color.    Call your doctor now or seek immediate medical care if:    · You cannot move your hand.     · Your hand pops, moves out of its normal position, and then returns to its normal position.     · You have signs of infection, such as:  ? Increased pain, swelling, warmth, or redness. ? Red streaks leading from the sore area.   ? Pus draining from a place on your hand. ? A fever.     · Your hand feels numb or tingly.    Watch closely for changes in your health, and be sure to contact your doctor if:    · Your hand feels unstable when you try to use it.     · You do not get better as expected.     · You have any new symptoms, such as swelling.     · Bruises from an injury to your hand last longer than 2 weeks. Where can you learn more? Go to http://ladarius-pavel.info/. Enter R273 in the search box to learn more about \"Hand Pain: Care Instructions. \"  Current as of: November 20, 2017  Content Version: 11.8  © 5099-6150 [employer]. Care instructions adapted under license by [employer] (which disclaims liability or warranty for this information). If you have questions about a medical condition or this instruction, always ask your healthcare professional. [address] any warranty or liability for your use of this information.

## 2018-11-10 NOTE — ED PROVIDER NOTES
EMERGENCY DEPARTMENT HISTORY AND PHYSICAL EXAM      Date: 11/10/2018  Patient Name: Adrianna Gallo    History of Presenting Illness     Chief Complaint   Patient presents with    Hand Pain     Ambulatory into the ED with c/o non-traumatic Rt hand pain x 2 weeks       History Provided By: Patient    HPI: Adrianna Gallo, 43 y.o. male presents to the ED with cc of non-traumatic right (dominate) hand pain x 2 weeks. The patient states the pain and swelling and decreased ROM are all worsening over the past 2 weeks. He has taken Tylenol without relief and applied ice. He denies Hx of the same. There is a remote Hx of prior fractures in the hand without long term sequela. There are no other complaints, changes, or physical findings at this time. Social Hx: Tobacco (denies), EtOH (denies), Illicit drug use (denies)     PCP: Brian Barger MD    Current Outpatient Medications   Medication Sig Dispense Refill    naproxen (NAPROSYN) 500 mg tablet Take 1 Tab by mouth every twelve (12) hours as needed for Pain. 20 Tab 0    traMADol (ULTRAM) 50 mg tablet Take 1 Tab by mouth every six (6) hours as needed for Pain. Max Daily Amount: 200 mg. 20 Tab 0    doxycycline (VIBRA-TABS) 100 mg tablet Take 1 Tab by mouth two (2) times a day for 7 days. 14 Tab 0    butalbital-acetaminophen-caffeine (FIORICET, ESGIC) -40 mg per tablet TAKE 1 TABLET BY MOUTH AT THE ONSET OF HEADACHE. CAN TAKE TWICE DAILY BUT NOT MORE THAN 10 DAYS/MONTH.ONLY TO BE FILLED IN 1 MONTH INTERVALS 20 Tab 2    busPIRone (BUSPAR) 15 mg tablet TAKE ONE TABLET BY MOUTH TWICE DAILY 60 Tab 3    levothyroxine (SYNTHROID) 112 mcg tablet TAKE ONE TABLET BY MOUTH ONCE DAILY BEFORE BREAKFAST 90 Tab 3    lisinopril (PRINIVIL, ZESTRIL) 10 mg tablet TAKE ONE TABLET BY MOUTH ONCE DAILY 90 Tab 3    fenofibrate (LOFIBRA) 160 mg tablet Take 1 Tab by mouth daily. 90 Tab 3    pravastatin (PRAVACHOL) 40 mg tablet Take 1 Tab by mouth nightly.  90 Tab 3    sertraline (ZOLOFT) 100 mg tablet TAKE ONE TABLET BY MOUTH ONCE DAILY 30 Tab 11    amitriptyline (ELAVIL) 25 mg tablet Take 1 Tab by mouth nightly. 12.5 mg p.o. nightly for 1 week and then 25 mg p.o. nightly 30 Tab 5    cyclobenzaprine (FLEXERIL) 10 mg tablet Take 1 Tab by mouth three (3) times daily (with meals). Indications: MUSCLE SPASM 20 Tab 0    insulin NPH (NOVOLIN N, HUMULIN N) 100 unit/mL injection 100 units twice daily 1 Vial 12    insulin regular (NOVOLIN R, HUMULIN R) 100 unit/mL injection 15-20 units TID AC (Patient taking differently: 15-20 units TID AC  Indications: 0-20u BID, per pt) 1 Vial 12    metFORMIN (GLUCOPHAGE) 500 mg tablet Take 1 Tab by mouth two (2) times daily (with meals). 180 Tab 3       Past History     Past Medical History:  Past Medical History:   Diagnosis Date    Chronic headaches     Depression     Diabetes ([facility] [address]     Hypertension     Thyroid disease     hypothyroid    Unspecified sleep apnea     does not use CPAP       Past Surgical History:  Past Surgical History:   Procedure Laterality Date    HX CHOLECYSTECTOMY  14    Dr Lynn Grand    HX HEENT      wisdom teeth removed    HX ORTHOPAEDIC      carpel tunnel       Family History:  Family History   Problem Relation Age of Onset    Diabetes Mother     Heart Disease Father     Cancer Father     Diabetes Father     Diabetes Paternal Aunt        Social History:  Social History     Tobacco Use    Smoking status: Former Smoker     Packs/day: 0.00     Years: 14.00     Pack years: 0.00     Last attempt to quit: 2014     Years since quittin.8    Smokeless tobacco: Never Used   Substance Use Topics    Alcohol use: No    Drug use: No       Allergies:   Allergies   Allergen Reactions    Demerol [Meperidine] Hives    Penicillin G Swelling    Percocet [Oxycodone-Acetaminophen] Hives and Nausea and Vomiting    Sulfa (Sulfonamide Antibiotics) Swelling         Review of Systems   Review of Systems Constitutional: Negative for chills, diaphoresis and fever. HENT: Negative for congestion, ear pain, rhinorrhea and sore throat. Respiratory: Negative for cough and shortness of breath. Cardiovascular: Negative for chest pain. Gastrointestinal: Negative for abdominal pain, constipation, diarrhea, nausea and vomiting. Genitourinary: Negative for difficulty urinating, dysuria, frequency and hematuria. Musculoskeletal: Positive for arthralgias, joint swelling and myalgias. R hand pain/swelling   Neurological: Negative for headaches. All other systems reviewed and are negative. Physical Exam   Physical Exam   Constitutional: He is oriented to person, place, and time. He appears well-developed and well-nourished. No distress. Pleasant 43 y.o.  male    HENT:   Head: Normocephalic and atraumatic. Eyes: Conjunctivae are normal. Right eye exhibits no discharge. Left eye exhibits no discharge. Neck: Normal range of motion. Neck supple. Cardiovascular: Normal rate and intact distal pulses. Pulmonary/Chest: Effort normal and breath sounds normal.   Musculoskeletal:   R HAND: + swelling and slight erythema to the thenar eminence with tenderness to palpation. FROM of the thumb with pain. Painless ROM of the other fingers. Distal NV intact. Cap refill < 2 sec. Ambulatory without difficulty. Lymphadenopathy:     He has no cervical adenopathy. Neurological: He is alert and oriented to person, place, and time. Skin: Skin is warm and dry. He is not diaphoretic. Psychiatric: He has a normal mood and affect. His behavior is normal.   Nursing note and vitals reviewed. Diagnostic Study Results     Labs - None    Radiologic Studies -   XR HAND RT MIN 3 V (Final result)   Result time 11/10/18 16:17:53   Final result by Jenaro Eldridge Results In (11/10/18 16:16:16)                Initial Result:   Impression:    IMPRESSION:  No acute abnormality.               Narrative:    Hien Hancock HAND RT MIN 3 V    INDICATION:  right thumb and 1st finger pain. COMPARISON: None. FINDINGS: Three views of the right hand demonstrate no fracture or other acute  osseous or articular abnormality.  The soft tissues are within normal limits. Medical Decision Making   I am the first provider for this patient. I reviewed the vital signs, available nursing notes, past medical history, past surgical history, family history and social history. Vital Signs-Reviewed the patient's vital signs. Patient Vitals for the past 12 hrs:   Temp Pulse Resp BP SpO2   11/10/18 1519 98.2 °F (36.8 °C) (!) 106 12 104/60 99 %     Records Reviewed: Nursing Notes    Provider Notes (Medical Decision Making):   Sprain, strain, gamekeepers thumb, cellulitis, tenosynovitis,     ED Course:   Initial assessment performed. The patients presenting problems have been discussed, and they are in agreement with the care plan formulated and outlined with them. I have encouraged them to ask questions as they arise throughout their visit. Critical Care Time:   none    Disposition:  DISCHARGE NOTE:  4:34 PM  The pt is ready for discharge. The pt's signs, symptoms, diagnosis, and discharge instructions have been discussed and pt has conveyed their understanding. The pt is to follow up as recommended or return to ER should their symptoms worsen. Plan has been discussed and pt is in agreement. PLAN:  1. Current Discharge Medication List      START taking these medications    Details   naproxen (NAPROSYN) 500 mg tablet Take 1 Tab by mouth every twelve (12) hours as needed for Pain. Qty: 20 Tab, Refills: 0      traMADol (ULTRAM) 50 mg tablet Take 1 Tab by mouth every six (6) hours as needed for Pain. Max Daily Amount: 200 mg. Qty: 20 Tab, Refills: 0    Associated Diagnoses: Right hand pain      doxycycline (VIBRA-TABS) 100 mg tablet Take 1 Tab by mouth two (2) times a day for 7 days.   Qty: 14 Tab, Refills: 0 CONTINUE these medications which have NOT CHANGED    Details   butalbital-acetaminophen-caffeine (FIORICET, ESGIC) -40 mg per tablet TAKE 1 TABLET BY MOUTH AT THE ONSET OF HEADACHE. CAN TAKE TWICE DAILY BUT NOT MORE THAN 10 DAYS/MONTH.ONLY TO BE FILLED IN 1 MONTH INTERVALS  Qty: 20 Tab, Refills: 2    Comments: Please consider 90 day supplies to promote better adherence  Associated Diagnoses: Migraine without aura and without status migrainosus, not intractable      busPIRone (BUSPAR) 15 mg tablet TAKE ONE TABLET BY MOUTH TWICE DAILY  Qty: 60 Tab, Refills: 3    Comments: Please consider 90 day supplies to promote better adherence      levothyroxine (SYNTHROID) 112 mcg tablet TAKE ONE TABLET BY MOUTH ONCE DAILY BEFORE BREAKFAST  Qty: 90 Tab, Refills: 3    Associated Diagnoses: Acquired hypothyroidism      lisinopril (PRINIVIL, ZESTRIL) 10 mg tablet TAKE ONE TABLET BY MOUTH ONCE DAILY  Qty: 90 Tab, Refills: 3    Associated Diagnoses: Essential hypertension      fenofibrate (LOFIBRA) 160 mg tablet Take 1 Tab by mouth daily. Qty: 90 Tab, Refills: 3      pravastatin (PRAVACHOL) 40 mg tablet Take 1 Tab by mouth nightly. Qty: 90 Tab, Refills: 3      sertraline (ZOLOFT) 100 mg tablet TAKE ONE TABLET BY MOUTH ONCE DAILY  Qty: 30 Tab, Refills: 11    Comments: Please consider 90 day supplies to promote better adherence      amitriptyline (ELAVIL) 25 mg tablet Take 1 Tab by mouth nightly. 12.5 mg p.o. nightly for 1 week and then 25 mg p.o. nightly  Qty: 30 Tab, Refills: 5    Associated Diagnoses: Migraine without aura and without status migrainosus, not intractable      cyclobenzaprine (FLEXERIL) 10 mg tablet Take 1 Tab by mouth three (3) times daily (with meals). Indications:  MUSCLE SPASM  Qty: 20 Tab, Refills: 0      insulin NPH (NOVOLIN N, HUMULIN N) 100 unit/mL injection 100 units twice daily  Qty: 1 Vial, Refills: 12    Associated Diagnoses: Uncontrolled type 1 diabetes mellitus without complication ([facility] [address] insulin regular (NOVOLIN R, HUMULIN R) 100 unit/mL injection 15-20 units TID AC  Qty: 1 Vial, Refills: 12    Associated Diagnoses: Uncontrolled type 1 diabetes mellitus without complication (HCC)      metFORMIN (GLUCOPHAGE) 500 mg tablet Take 1 Tab by mouth two (2) times daily (with meals). Qty: 180 Tab, Refills: 3    Associated Diagnoses: Type 2 diabetes mellitus with complication, with long-term current use of insulin ([facility] [address]           2. Follow-up Information     Follow up With Specialties Details Why Contact Leeanne Valdivia MD Hand Surgery In 1 week  [facility]  [address]  [phone]          Return to ED if worse     Diagnosis     Clinical Impression:   1. Right hand pain          This note will not be viewable in MyChart.

## 2018-11-10 NOTE — ED NOTES

## 2018-12-12 ENCOUNTER — ANESTHESIA EVENT (OUTPATIENT)
Dept: SURGERY | Age: 38
End: 2018-12-12
Payer: COMMERCIAL

## 2018-12-12 NOTE — PERIOP NOTES
Pt states he breaks out in hives with PCN  Does not know if he has ever had any other forms of PCN in past. Normally does Z Packs for antibiotics       Requested pharmacy to remove morphine be removed from allergy list .

## 2018-12-12 NOTE — PERIOP NOTES
[facility]  Ambulatory Surgery Unit  Pre-operative Instructions    Surgery/Procedure Date  12/13/18           Tentative Arrival Time 0915      1. On the day of your surgery/procedure, please report to the Ambulatory Surgery Unit Registration Desk and sign in at your designated time. The Ambulatory Surgery Unit is located in [facility] on the [facility] side of the [facility] across from the [address] [place] [address]. Please have all of your health insurance cards and a photo ID. 2. You must have someone with you to drive you home, as you should not drive a car for 24 hours following anesthesia. Please make arrangements for a responsible adult friend or family member to stay with you for at least the first 24 hours after your surgery. 3. Do not have anything to eat or drink (including water, gum, mints, coffee, juice) after 11:59 PM  12/12/18. This may not apply to medications prescribed by your physician. (Please note below the special instructions with medications to take the morning of surgery, if applicable.)    4. We recommend you do not drink any alcoholic beverages for 24 hours before and after your surgery. 5. Contact your surgeons office for instructions on the following medications: non-steroidal anti-inflammatory drugs (i.e. Advil, Aleve), vitamins, and supplements. (Some surgeons will want you to stop these medications prior to surgery and others may allow you to take them)   **If you are currently taking Plavix, Coumadin, Aspirin and/or other blood-thinning agents, contact your surgeon for instructions. ** Your surgeon will partner with the physician prescribing these medications to determine if it is safe to stop or if you need to continue taking. Please do not stop taking these medications without instructions from your surgeon.     6. In an effort to help prevent surgical site infection, we ask that you shower with an anti-bacterial soap (i.e. Dial/Safeguard, or the soap provided to you at your preadmission testing appointment) for 3 days prior to and on the morning of surgery, using a fresh towel after each shower. (Please begin this process with fresh bed linens.) Do not apply any lotions, powders, or deodorants after the shower on the day of your procedure. If applicable, please do not shave the operative site for 48 hours prior to surgery. 7. Wear comfortable clothes. Wear glasses instead of contacts. Do not bring any jewelry or money (other than copays or fees as instructed). Do not wear make-up, particularly mascara, the morning of your surgery. Do not wear nail polish, particularly if you are having foot /hand surgery. Wear your hair loose or down, no ponytails, buns, stephie pins or clips. All body piercings must be removed. 8. You should understand that if you do not follow these instructions your surgery may be cancelled. If your physical condition changes (i.e. fever, cold or flu) please contact your surgeon as soon as possible. 9. It is important that you be on time. If a situation occurs where you may be late, or if you have any questions or problems, please call [phone].    10. Your surgery time may be subject to change. You will receive a phone call the day prior to surgery to confirm your arrival time. 11. Pediatric patients: please bring a change of clothes, diapers, bottle/sippy cup, pacifier, etc.      Special Instructions: Take all medications and inhalers, as prescribed, on the morning of surgery with a sip of water EXCEPT: Diabetic meds       Insulin Dependent Diabetic patients: Take your diabetic medications as prescribed the day before surgery. Hold all diabetic medications the day of surgery. If you are scheduled to arrive for surgery after 8:00 AM, and your AM blood sugar is >200, please call Ambulatory Surgery. I understand a pre-operative phone call will be made to verify my surgery time.   In the event that I am not available, I give permission for a message to be left on my answering service and/or with another person?       yes    Preop instructions reviewed  Pt verbalized understanding.      ___________________      ___________________      ________________  (Signature of Patient)          (Witness)                   (Date and Time)

## 2018-12-13 ENCOUNTER — ANESTHESIA (OUTPATIENT)
Dept: SURGERY | Age: 38
End: 2018-12-13
Payer: COMMERCIAL

## 2018-12-13 ENCOUNTER — HOSPITAL ENCOUNTER (OUTPATIENT)
Age: 38
Setting detail: OUTPATIENT SURGERY
Discharge: HOME | End: 2018-12-13
Attending: ORTHOPAEDIC SURGERY | Admitting: ORTHOPAEDIC SURGERY
Payer: COMMERCIAL

## 2018-12-13 VITALS
SYSTOLIC BLOOD PRESSURE: 150 MMHG | DIASTOLIC BLOOD PRESSURE: 76 MMHG | OXYGEN SATURATION: 97 % | BODY MASS INDEX: 37.62 KG/M2 | HEIGHT: 69 IN | HEART RATE: 91 BPM | RESPIRATION RATE: 12 BRPM | TEMPERATURE: 97.5 F | WEIGHT: 254 LBS

## 2018-12-13 DIAGNOSIS — M79.609 POSTOPERATIVE PAIN OF EXTREMITY: Primary | ICD-10-CM

## 2018-12-13 DIAGNOSIS — G89.18 POSTOPERATIVE PAIN OF EXTREMITY: Primary | ICD-10-CM

## 2018-12-13 LAB
GLUCOSE BLD STRIP.AUTO-MCNC: 252 MG/DL (ref 65–100)
GLUCOSE BLD STRIP.AUTO-MCNC: 255 MG/DL (ref 65–100)
SERVICE CMNT-IMP: ABNORMAL
SERVICE CMNT-IMP: ABNORMAL

## 2018-12-13 PROCEDURE — 74011250636 HC RX REV CODE- 250/636

## 2018-12-13 PROCEDURE — 77030002916 HC SUT ETHLN J&J -A: Performed by: ORTHOPAEDIC SURGERY

## 2018-12-13 PROCEDURE — 77030021352 HC CBL LD SYS DISP COVD -B: Performed by: ORTHOPAEDIC SURGERY

## 2018-12-13 PROCEDURE — 76060000062 HC AMB SURG ANES 1 TO 1.5 HR: Performed by: ORTHOPAEDIC SURGERY

## 2018-12-13 PROCEDURE — 76210000040 HC AMBSU PH I REC FIRST 0.5 HR: Performed by: ORTHOPAEDIC SURGERY

## 2018-12-13 PROCEDURE — A4565 SLINGS: HCPCS | Performed by: ORTHOPAEDIC SURGERY

## 2018-12-13 PROCEDURE — 82962 GLUCOSE BLOOD TEST: CPT

## 2018-12-13 PROCEDURE — 76030000001 HC AMB SURG OR TIME 1 TO 1.5: Performed by: ORTHOPAEDIC SURGERY

## 2018-12-13 PROCEDURE — 74011250636 HC RX REV CODE- 250/636: Performed by: ORTHOPAEDIC SURGERY

## 2018-12-13 PROCEDURE — 77030018836 HC SOL IRR NACL ICUM -A: Performed by: ORTHOPAEDIC SURGERY

## 2018-12-13 PROCEDURE — 76210000050 HC AMBSU PH II REC 0.5 TO 1 HR: Performed by: ORTHOPAEDIC SURGERY

## 2018-12-13 PROCEDURE — 77030006607 HC BLD CARPL SAFGD INLC -C: Performed by: ORTHOPAEDIC SURGERY

## 2018-12-13 PROCEDURE — 74011000250 HC RX REV CODE- 250: Performed by: ORTHOPAEDIC SURGERY

## 2018-12-13 PROCEDURE — 77030000032 HC CUF TRNQT ZIMM -B: Performed by: ORTHOPAEDIC SURGERY

## 2018-12-13 PROCEDURE — 77030020255 HC SOL INJ LR 1000ML BG: Performed by: ORTHOPAEDIC SURGERY

## 2018-12-13 PROCEDURE — 74011250636 HC RX REV CODE- 250/636: Performed by: ANESTHESIOLOGY

## 2018-12-13 RX ORDER — ONDANSETRON 2 MG/ML
INJECTION INTRAMUSCULAR; INTRAVENOUS AS NEEDED
Status: DISCONTINUED | OUTPATIENT
Start: 2018-12-13 | End: 2018-12-13 | Stop reason: HOSPADM

## 2018-12-13 RX ORDER — HYDROMORPHONE HYDROCHLORIDE 1 MG/ML
.2-.5 INJECTION, SOLUTION INTRAMUSCULAR; INTRAVENOUS; SUBCUTANEOUS ONCE
Status: DISCONTINUED | OUTPATIENT
Start: 2018-12-13 | End: 2018-12-13 | Stop reason: HOSPADM

## 2018-12-13 RX ORDER — HYDROCODONE BITARTRATE AND ACETAMINOPHEN 5; 325 MG/1; MG/1
1 TABLET ORAL
Qty: 25 TAB | Refills: 0 | Status: SHIPPED | OUTPATIENT
Start: 2018-12-13 | End: 2019-03-21 | Stop reason: ALTCHOICE

## 2018-12-13 RX ORDER — FENTANYL CITRATE 50 UG/ML
INJECTION, SOLUTION INTRAMUSCULAR; INTRAVENOUS AS NEEDED
Status: DISCONTINUED | OUTPATIENT
Start: 2018-12-13 | End: 2018-12-13 | Stop reason: HOSPADM

## 2018-12-13 RX ORDER — DIPHENHYDRAMINE HYDROCHLORIDE 50 MG/ML
12.5 INJECTION, SOLUTION INTRAMUSCULAR; INTRAVENOUS AS NEEDED
Status: DISCONTINUED | OUTPATIENT
Start: 2018-12-13 | End: 2018-12-13 | Stop reason: HOSPADM

## 2018-12-13 RX ORDER — CLINDAMYCIN PHOSPHATE 900 MG/50ML
900 INJECTION INTRAVENOUS ONCE
Status: COMPLETED | OUTPATIENT
Start: 2018-12-13 | End: 2018-12-13

## 2018-12-13 RX ORDER — LIDOCAINE HYDROCHLORIDE 10 MG/ML
0.1 INJECTION, SOLUTION EPIDURAL; INFILTRATION; INTRACAUDAL; PERINEURAL AS NEEDED
Status: DISCONTINUED | OUTPATIENT
Start: 2018-12-13 | End: 2018-12-13 | Stop reason: HOSPADM

## 2018-12-13 RX ORDER — SODIUM CHLORIDE 0.9 % (FLUSH) 0.9 %
5-10 SYRINGE (ML) INJECTION AS NEEDED
Status: DISCONTINUED | OUTPATIENT
Start: 2018-12-13 | End: 2018-12-13 | Stop reason: HOSPADM

## 2018-12-13 RX ORDER — DEXAMETHASONE SODIUM PHOSPHATE 4 MG/ML
INJECTION, SOLUTION INTRA-ARTICULAR; INTRALESIONAL; INTRAMUSCULAR; INTRAVENOUS; SOFT TISSUE AS NEEDED
Status: DISCONTINUED | OUTPATIENT
Start: 2018-12-13 | End: 2018-12-13 | Stop reason: HOSPADM

## 2018-12-13 RX ORDER — SODIUM CHLORIDE 0.9 % (FLUSH) 0.9 %
5-10 SYRINGE (ML) INJECTION EVERY 8 HOURS
Status: DISCONTINUED | OUTPATIENT
Start: 2018-12-13 | End: 2018-12-13 | Stop reason: HOSPADM

## 2018-12-13 RX ORDER — PROPOFOL 10 MG/ML
INJECTION, EMULSION INTRAVENOUS AS NEEDED
Status: DISCONTINUED | OUTPATIENT
Start: 2018-12-13 | End: 2018-12-13 | Stop reason: HOSPADM

## 2018-12-13 RX ORDER — PROPOFOL 10 MG/ML
INJECTION, EMULSION INTRAVENOUS
Status: DISCONTINUED | OUTPATIENT
Start: 2018-12-13 | End: 2018-12-13 | Stop reason: HOSPADM

## 2018-12-13 RX ORDER — HYDROMORPHONE HYDROCHLORIDE 2 MG/1
2 TABLET ORAL
Status: DISCONTINUED | OUTPATIENT
Start: 2018-12-13 | End: 2018-12-13 | Stop reason: HOSPADM

## 2018-12-13 RX ORDER — SODIUM CHLORIDE, SODIUM LACTATE, POTASSIUM CHLORIDE, CALCIUM CHLORIDE 600; 310; 30; 20 MG/100ML; MG/100ML; MG/100ML; MG/100ML
25 INJECTION, SOLUTION INTRAVENOUS CONTINUOUS
Status: DISCONTINUED | OUTPATIENT
Start: 2018-12-13 | End: 2018-12-13 | Stop reason: HOSPADM

## 2018-12-13 RX ORDER — LIDOCAINE HYDROCHLORIDE AND EPINEPHRINE 20; 5 MG/ML; UG/ML
INJECTION, SOLUTION EPIDURAL; INFILTRATION; INTRACAUDAL; PERINEURAL AS NEEDED
Status: DISCONTINUED | OUTPATIENT
Start: 2018-12-13 | End: 2018-12-13 | Stop reason: HOSPADM

## 2018-12-13 RX ORDER — ACETAMINOPHEN 10 MG/ML
INJECTION, SOLUTION INTRAVENOUS AS NEEDED
Status: DISCONTINUED | OUTPATIENT
Start: 2018-12-13 | End: 2018-12-13 | Stop reason: HOSPADM

## 2018-12-13 RX ORDER — MIDAZOLAM HYDROCHLORIDE 1 MG/ML
INJECTION, SOLUTION INTRAMUSCULAR; INTRAVENOUS AS NEEDED
Status: DISCONTINUED | OUTPATIENT
Start: 2018-12-13 | End: 2018-12-13 | Stop reason: HOSPADM

## 2018-12-13 RX ORDER — FENTANYL CITRATE 50 UG/ML
25 INJECTION, SOLUTION INTRAMUSCULAR; INTRAVENOUS
Status: DISCONTINUED | OUTPATIENT
Start: 2018-12-13 | End: 2018-12-13 | Stop reason: HOSPADM

## 2018-12-13 RX ADMIN — PROPOFOL 10 MG: 10 INJECTION, EMULSION INTRAVENOUS at 10:41

## 2018-12-13 RX ADMIN — DEXAMETHASONE SODIUM PHOSPHATE 4 MG: 4 INJECTION, SOLUTION INTRA-ARTICULAR; INTRALESIONAL; INTRAMUSCULAR; INTRAVENOUS; SOFT TISSUE at 10:33

## 2018-12-13 RX ADMIN — PROPOFOL 20 MG: 10 INJECTION, EMULSION INTRAVENOUS at 11:23

## 2018-12-13 RX ADMIN — ACETAMINOPHEN 1000 MG: 10 INJECTION, SOLUTION INTRAVENOUS at 10:28

## 2018-12-13 RX ADMIN — PROPOFOL 20 MG: 10 INJECTION, EMULSION INTRAVENOUS at 10:35

## 2018-12-13 RX ADMIN — CLINDAMYCIN PHOSPHATE 900 MG: 18 INJECTION, SOLUTION INTRAVENOUS at 10:27

## 2018-12-13 RX ADMIN — PROPOFOL 75 MCG/KG/MIN: 10 INJECTION, EMULSION INTRAVENOUS at 10:23

## 2018-12-13 RX ADMIN — FENTANYL CITRATE 25 MCG: 50 INJECTION, SOLUTION INTRAMUSCULAR; INTRAVENOUS at 11:14

## 2018-12-13 RX ADMIN — FENTANYL CITRATE 25 MCG: 50 INJECTION, SOLUTION INTRAMUSCULAR; INTRAVENOUS at 11:21

## 2018-12-13 RX ADMIN — PROPOFOL 50 MG: 10 INJECTION, EMULSION INTRAVENOUS at 10:38

## 2018-12-13 RX ADMIN — FENTANYL CITRATE 25 MCG: 50 INJECTION, SOLUTION INTRAMUSCULAR; INTRAVENOUS at 10:38

## 2018-12-13 RX ADMIN — MIDAZOLAM HYDROCHLORIDE 1 MG: 1 INJECTION, SOLUTION INTRAMUSCULAR; INTRAVENOUS at 10:27

## 2018-12-13 RX ADMIN — PROPOFOL 30 MG: 10 INJECTION, EMULSION INTRAVENOUS at 11:14

## 2018-12-13 RX ADMIN — PROPOFOL 30 MG: 10 INJECTION, EMULSION INTRAVENOUS at 10:31

## 2018-12-13 RX ADMIN — SODIUM CHLORIDE, SODIUM LACTATE, POTASSIUM CHLORIDE, AND CALCIUM CHLORIDE 25 ML/HR: 600; 310; 30; 20 INJECTION, SOLUTION INTRAVENOUS at 09:42

## 2018-12-13 RX ADMIN — PROPOFOL 50 MG: 10 INJECTION, EMULSION INTRAVENOUS at 10:23

## 2018-12-13 RX ADMIN — MIDAZOLAM HYDROCHLORIDE 2 MG: 1 INJECTION, SOLUTION INTRAMUSCULAR; INTRAVENOUS at 10:23

## 2018-12-13 RX ADMIN — ONDANSETRON 4 MG: 2 INJECTION INTRAMUSCULAR; INTRAVENOUS at 10:33

## 2018-12-13 RX ADMIN — PROPOFOL 30 MG: 10 INJECTION, EMULSION INTRAVENOUS at 11:10

## 2018-12-13 RX ADMIN — PROPOFOL 20 MG: 10 INJECTION, EMULSION INTRAVENOUS at 10:36

## 2018-12-13 RX ADMIN — FENTANYL CITRATE 25 MCG: 50 INJECTION, SOLUTION INTRAMUSCULAR; INTRAVENOUS at 10:36

## 2018-12-13 RX ADMIN — PROPOFOL 40 MG: 10 INJECTION, EMULSION INTRAVENOUS at 11:21

## 2018-12-13 RX ADMIN — PROPOFOL 20 MG: 10 INJECTION, EMULSION INTRAVENOUS at 11:15

## 2018-12-13 RX ADMIN — PROPOFOL 10 MG: 10 INJECTION, EMULSION INTRAVENOUS at 11:12

## 2018-12-13 NOTE — PERIOP NOTES
Received to PACU. Oral airway intact. No distress noted. VSS    1150 Aroused to name called. Oral airway removed. Pt denies pain, able to move fingers right hand    1158 QO=[id number] . Taking sips diet pepsi intermittantly    1 Mom and girlfriend to bedside. D/C instructions reviewed with all and Mother signed for instructions. 1225 Pt ready for discharge. Denies pain or nausea. VSS. Taking po fluids well. 1236 Discharged to home via/wc,accompanied to car per RN. Skin warm and dry, awake and alert. Respirations even, unlabored. Pt and family members questions and concerns addressed prior to discharge. All belongings with pt.  Sling applied to RUE prior to discharge

## 2018-12-13 NOTE — DISCHARGE INSTRUCTIONS
Discharge Instructions for:    Alena Hameed / [id number] : 1980    Admitted 2018 Discharged: 2018       Postoperative Hand Surgery Instructions      Elevation:  Elevation is one of the most important things to do following your surgery. Not only does it decrease swelling, but it also decreases pain. For hand or wrist surgery, keep the arm in your sling while up and about, with your hand above your elbow. When lying down, keep your arm propped up on a few pillows, so that your fingers are pointing towards the ceiling. Finger Motion:  **It is very important that you begin moving your fingers immediately after surgery, as often as you can, in order to prevent stiffness. Wiggling your fingers is not the same as moving them - moving your fingers involves bending them until they touch the dressing   (if possible). You should use your other hand to gently move the fingers on the operative hand if necessary. Dressings:  Please leave the surgical dressing in place until you are seen in the office for your first post-operative appointment with Dr Jose Bunn. The dressing helps to prevent infection and positions the extremity to protect the surgical procedure  that was performed. Bathing and showering are allowed as long as the dressing is kept dry. To keep your dressing dry while bathing, simply place a plastic bag (bread bag, newspaper bag, trash bag or subway sandwich bag) over the dressing and seal it with tape or a rubber band. Medications: You have likely been given a prescription for pain medication. Please follow the instructions closely. It is safe to take up to 600mg of Ibuprofen (Advil or Motrin) along with the pain prescription as long you are not allergic to it, are not on blood thinners, and do not have gastric reflux or an ulcer. However, do not take any additional tylenol/acetaminophen if your prescription contains tylenol.       Note that my policy is to give only one prescription for pain medication at the time of the surgery - if you use it up quickly, then you will have no more pain medication left after only a few days, and I will not give you another prescription. So use your pain medication sparingly, and only when necessary! If you have new or increasing numbness after any numbing medicine wears off (12-24 hours for regional blocks, 3 hours for local), call the office immediately. If you experience nausea, vomiting or itching with the pain medication, please call the office during regular office hours. Refills for pain medication prescriptions ARE NOT given on the weekend or after regular office hours. If antibiotics have been prescribed, please be sure to complete the entire prescription. Post-Operative Appointments:  Dr. Maria Esther Orozco likes to see you back in the office about 10-14 days following your surgery to change the post-operative dressing and remove any necessary sutures or staples. If you have not received a follow up appointment card please contact our office at [phone]. *If you have any questions or concerns or experience any sudden changes in your condition, please call our office at [phone]*    DO NOT TAKE TYLENOL/ACETAMINOPHEN WITH PERCOCET, [address], [address] [address] TAKE NARCOTIC PAIN MEDICATIONS WITH FOOD     Narcotics tend to be constipating, we suggest taking a stool softener such as Colace or Miralax (follow package instructions). DO NOT DRIVE WHILE TAKING NARCOTIC PAIN MEDICATIONS. DO NOT TAKE SLEEPING MEDICATIONS OR ANTIANXIETY MEDICATIONS WHILE TAKING NARCOTIC PAIN MEDICATIONS,  ESPECIALLY THE NIGHT OF ANESTHESIA! CPAP PATIENTS BE SURE TO WEAR MACHINE WHENEVER NAPPING OR SLEEPING!     DISCHARGE SUMMARY from Nurse    The following personal items collected during your admission are returned to you:   Dental Appliance: Dental Appliances: None  Vision: Visual Aid: None  Hearing Aid:    Jewelry: Jewelry: None  Clothing: Clothing: With patient  Other Valuables: Other Valuables: None  Valuables sent to safe:        PATIENT INSTRUCTIONS:    After General Anesthesia or Intravenous Sedation, for 24 hours or while taking prescription Narcotics:        Someone should be with you for the next 24 hours. For your own safety, a responsible adult must drive you home. · Limit your activities  · Recommended activity: Rest today, up with assistance today. Do not climb stairs or shower unattended for the next 24 hours. · Please start with a soft bland diet and advance as tolerated (no nausea) to regular diet. · If you have a sore throat you should try the following: fluids, warm salt water gargles, or throat lozenges. If it does not improve after several days please follow up with your primary physician. · Do not drive and operate hazardous machinery  · Do not make important personal or business decisions  · Do  not drink alcoholic beverages  · If you have not urinated within 8 hours after discharge, please contact your surgeon on call. Report the following to your surgeon:  · Excessive pain, swelling, redness or odor of or around the surgical area  · Temperature over 100.5  · Nausea and vomiting lasting longer than 4 hours or if unable to take medications  · Any signs of decreased circulation or nerve impairment to extremity: change in color, persistent  numbness, tingling, coldness or increase pain      · You will receive a Post Operative Call from one of the Recovery Room Nurses on the day after your surgery to check on you. It is very important for us to know how you are recovering after your surgery. If you have an issue or need to speak with someone, please call your surgeon, do not wait for the post operative call. · You may receive an e-mail or letter in the mail from [facility] regarding your experience with us in the Ambulatory Surgery Unit.  Your feedback is valuable to us and we appreciate your participation in the survey. · If the above instructions are not adequate or you are having problems after your surgery, call the physician at their office number. · We wish you a speedy recovery ? What to do at Home:      *  Please give a list of your current medications to your Primary Care Provider. *  Please update this list whenever your medications are discontinued, doses are      changed, or new medications (including over-the-counter products) are added. *  Please carry medication information at all times in case of emergency situations. These are general instructions for a healthy lifestyle:    No smoking/ No tobacco products/ Avoid exposure to second hand smoke    Surgeon General's Warning:  Quitting smoking now greatly reduces serious risk to your health. Obesity, smoking, and sedentary lifestyle greatly increases your risk for illness    A healthy diet, regular physical exercise & weight monitoring are important for maintaining a healthy lifestyle    You may be retaining fluid if you have a history of heart failure or if you experience any of the following symptoms:  Weight gain of 3 pounds or more overnight or 5 pounds in a week, increased swelling in our hands or feet or shortness of breath while lying flat in bed. Please call your doctor as soon as you notice any of these symptoms; do not wait until your next office visit. Recognize signs and symptoms of STROKE:    B - Balance  E - Eyes    F-  Face looks uneven  A-  Arms unable to move or move even  S-  Speech slurred or non-existent  T-  Time-call 911 as soon as signs and symptoms begin-DO NOT go       Back to bed or wait to see if you get better-TIME IS BRAIN. If you have not received your influenza and/or pneumococcal vaccine, please follow up with your primary care physician. The discharge information has been reviewed with the patient and caregiver. The patient and caregiver verbalized understanding.     TO PREVENT AN INFECTION      1. [address] YOUR HANDS     To prevent infection, good handwashing is the most important thing you or your caregiver can do.  Wash your hands with soap and water or use the hand  we gave you before you touch any wounds. 2. SHOWER     Use the antibacterial soap we gave you when you take a shower.  Shower with this soap until your wounds are healed.  To reach all areas of your body, you may need someone to help you.  Dont forget to clean your belly button with every shower. 3.  USE CLEAN SHEETS     Use freshly cleaned sheets on your bed after surgery.  To keep the surgery site clean, do not allow pets to sleep with you while your wound is still healing. 4. STOP SMOKING     Stop smoking, or at least cut back on smoking     Smoking slows your healing. 5.  CONTROL YOUR BLOOD SUGAR     High blood sugars slow wound healing.  If you are diabetic, control your blood sugar levels before and after your surgery.

## 2018-12-13 NOTE — OP NOTES
OPERATIVE REPORT          PREOPERATIVE DIAGNOSES  1. right carpal tunnel syndrome. POSTOPERATIVE DIAGNOSES  1. right carpal tunnel syndrome. OPERATIVE PROCEDURE: right carpal tunnel release. SURGEON: Flor Adames MD    ANESTHESIA: Local MAC anesthesia. COMPLICATIONS: None. ESTIMATED BLOOD LOSS: Minimal.    SPECIMENS:  None    IMPLANTS:  None    DESCRIPTION OF PROCEDURE: The patient brought into the operating room and  placed on the operating room table in the supine position and sedation  administered. The operative site had been identified, and appropriate preoperative antibiotic prophylaxis administered if indicated, in pre-op holding. The right upper extremity was prepped and draped in the usual  manner. After a time-out about 8 mL of 2% lidocaine with epinephrine were  injected in line with the proposed incision in the proximal palm. The limb  was then elevated, exsanguinated with an Esmarch bandage, and the  tourniquet inflated to 250 mmHg. A curved longitudinal incision was made in the proximal palm. The  subcutaneous tissues and palmar fascia were carefully divided. A mosquito  clamp was inserted underneath the ulnar-most portion of the transverse  carpal ligament. Under direct visualization a 15 blade was used to cut down  over the clamp, dividing the distal few millimeters of the TCL. A [place]  elevator was then inserted underneath the ulnar-most portion of the TCL,  followed by positioning of the KMI carpal tunnel release guide. The  single-use knife was then passed from distal to proximal, completely  dividing the TCL. Complete division was confirmed using right angle  retraction and loop magnification. The wound was thoroughly irrigated. Closure was performed with 4-0 nylon suture.       Bozena Celeste MD                                                     OPERATIVE REPORT       PREOPERATIVE DIAGNOSIS: Right index finger trigger finger, right trigger thumb  POSTOPERATIVE DIAGNOSIS: Right index finger trigger finger, right trigger thumb  OPERATIVE PROCEDURE: Release Right index finger trigger finger, right trigger thumb  SURGEON: Fernando Tyler MD.   ANESTHESIA: Local MAC anesthesia. ESTIMATED BLOOD LOSS: less than 5 ccl. SPECIMENS REMOVED: None. COMPLICATIONS: None. IMPLANTS: None. DESCRIPTION OF PROCEDURE: The patient was brought into the operating room,   placed in supine position and sedation administered. The Right upper   extremity was prepped and draped in the usual manner. After time-out,   approximately 5 ccs of 2% lidocaine with epinephrine were injected in   line with the proposed incision. The limb was then elevated, exsanguinated   with an Esmarch bandage and tourniquet inflated to 250 mmHg. A longitudinal incision was made in the distal palm, proximal to the index finger(a transverse incision was made in the thumb). Subcutaneous tissues were carefully divided and the A1 pulley located and divided under   direct visualization. The tendons then moved freely   with  flexion and extension of the digit, with no signs of triggering. The wound was   thoroughly irrigated. Closure was performed with 4-0 nylon suture. Xeroform, 4 x 4's, Kerlix and Coban were used as dressing. The tourniquet was   released. Total tourniquet time was 37 minutes, including the carpal tunnel release. The patient tolerated the procedures well, was taken back to the PACU in stable condition.      Cydney Denton MD   12/13/2018

## 2018-12-13 NOTE — BRIEF OP NOTE
BRIEF OPERATIVE NOTE    Date of Procedure: 12/13/2018   Preoperative Diagnosis: RIGHT CARPAL TUNNEL, RIGHT TRIGGER THUMB, RIGHT INDEX TRIGGER FINGER  Postoperative Diagnosis: RIGHT CARPAL TUNNEL, RIGHT TRIGGER THUMB, RIGHT INDEX TRIGGER FINGER    Procedure(s):  RIGHT CARPAL TUNNEL RELEASE, RIGHT TRIGGER THUMB RELEASE, RIGHT INDEX FINGER TRIGGER RELEASE (MAC/LOCAL)  FINGER TRIGGER RELEASE  Surgeon(s) and Role:     Tiny Hamm MD - Primary         Surgical Assistant: none    Surgical Staff:  Circ-1: Jakub Witt RN  Scrub Tech-1: Maria Teresa Spring   Event Time In Time Out   Incision Start 1045    Incision Close 1132      Anesthesia: MAC   Estimated Blood Loss: min  Specimens: * No specimens in log *   Findings: cts, tfs   Complications: none  Implants: * No implants in log *

## 2018-12-13 NOTE — ANESTHESIA PREPROCEDURE EVALUATION
Anesthetic History   No history of anesthetic complications            Review of Systems / Medical History  Patient summary reviewed, nursing notes reviewed and pertinent labs reviewed    Pulmonary        Sleep apnea: No treatment  Smoker      Comments: Former Smoker   Neuro/Psych         Headaches    Comments: Migraines  Anxiety Cardiovascular    Hypertension          Hyperlipidemia    Exercise tolerance: >4 METS     GI/Hepatic/Renal     GERD      PUD    Comments: Hx PANCREATITIS Endo/Other    Diabetes  Hypothyroidism  Obesity     Other Findings   Comments: Fibromyalgia           Physical Exam    Airway  Mallampati: III    Neck ROM: normal range of motion        Cardiovascular  Regular rate and rhythm,  S1 and S2 normal,  no murmur, click, rub, or gallop             Dental      Comments: 2 missing, no loose   Pulmonary  Breath sounds clear to auscultation               Abdominal  GI exam deferred       Other Findings            Anesthetic Plan    ASA: 3  Anesthesia type: MAC          Induction: Intravenous  Anesthetic plan and risks discussed with: Patient

## 2018-12-13 NOTE — PERIOP NOTES
Permission received to review discharge instructions and discuss private health information with Murali Jones and Willard London, mother and father and Deann Merlin, girlfriend.

## 2018-12-13 NOTE — PERIOP NOTES
Michaelbrigida Marisela  1980  [id number]    Situation:  Verbal report given from: Blas Dee RN and Rosario Fox CRNA  Procedure: Procedure(s):  RIGHT CARPAL TUNNEL RELEASE, RIGHT TRIGGER THUMB RELEASE, RIGHT INDEX FINGER TRIGGER RELEASE (MAC/LOCAL)  FINGER TRIGGER RELEASE    Background:    Preoperative diagnosis: RIGHT CARPAL TUNNEL, RIGHT TRIGGER THUMB, RIGHT INDEX TRIGGER FINGER    Postoperative diagnosis: RIGHT CARPAL TUNNEL, RIGHT TRIGGER THUMB, RIGHT INDEX TRIGGER FINGER    :  Dr. Doyal Mcburney    Assistant(s): Circ-1: Magen Miller RN  Scrub Tech-1: Cathy Frey     Specimens: * No specimens in log *    Assessment:  Intra-procedure medications         Anesthesia gave intra-procedure sedation and medications, see anesthesia flow sheet     Intravenous fluids: LR@ KVO     Vital signs stable       Recommendation:    Permission to share finding with Beckie Bennett (parents) and girlfriend Mallory : yes

## 2018-12-13 NOTE — ANESTHESIA POSTPROCEDURE EVALUATION
Procedure(s):  RIGHT CARPAL TUNNEL RELEASE, RIGHT TRIGGER THUMB RELEASE, RIGHT INDEX FINGER TRIGGER RELEASE (MAC/LOCAL)  FINGER TRIGGER RELEASE. Anesthesia Post Evaluation        Patient location during evaluation: PACU  Note status: Adequate. Level of consciousness: responsive to verbal stimuli and sleepy but conscious  Pain management: satisfactory to patient  Airway patency: patent  Anesthetic complications: no  Cardiovascular status: acceptable  Respiratory status: acceptable  Hydration status: acceptable  Comments: +Post-Anesthesia Evaluation and Assessment    Patient: Devorah Shirley MRN: [id number]  SSN: [id number]   YOB: 1980  Age: 43 y.o. Sex: male      Cardiovascular Function/Vital Signs    /79   Pulse 91   Temp 36.4 °C (97.5 °F)   Resp 12   Ht 5' 9\" (1.753 m)   Wt 115.2 kg (254 lb)   SpO2 97%   BMI 37.51 kg/m²     Patient is status post Procedure(s):  RIGHT CARPAL TUNNEL RELEASE, RIGHT TRIGGER THUMB RELEASE, RIGHT INDEX FINGER TRIGGER RELEASE (MAC/LOCAL)  FINGER TRIGGER RELEASE. Nausea/Vomiting: Controlled. Postoperative hydration reviewed and adequate. Pain:  Pain Scale 1: Numeric (0 - 10) (12/13/18 1214)  Pain Intensity 1: 0 (12/13/18 1214)   Managed. Neurological Status:   Neuro (WDL): Within Defined Limits (12/13/18 0921)   At baseline. Mental Status and Level of Consciousness: Arousable. Pulmonary Status:   O2 Device: Nasal cannula (12/13/18 1142)   Adequate oxygenation and airway patent. Complications related to anesthesia: None    Post-anesthesia assessment completed. No concerns.     Signed By: Brii Montalvo MD    12/13/2018  Post anesthesia nausea and vomiting:  controlled      Visit Vitals  /79   Pulse 91   Temp 36.4 °C (97.5 °F)   Resp 12   Ht 5' 9\" (1.753 m)   Wt 115.2 kg (254 lb)   SpO2 97%   BMI 37.51 kg/m²

## 2018-12-21 DIAGNOSIS — G43.009 MIGRAINE WITHOUT AURA AND WITHOUT STATUS MIGRAINOSUS, NOT INTRACTABLE: ICD-10-CM

## 2018-12-22 RX ORDER — BUTALBITAL, ACETAMINOPHEN AND CAFFEINE 50; 325; 40 MG/1; MG/1; MG/1
TABLET ORAL
Qty: 20 TAB | Refills: 2 | Status: SHIPPED | OUTPATIENT
Start: 2018-12-22 | End: 2019-04-18 | Stop reason: SDUPTHER

## 2019-01-25 NOTE — TELEPHONE ENCOUNTER
Spoke with Mr. Alex Segura, he states he takes flexeril on the regular. He will call back on Monday. I told him he was last given then in 2017.

## 2019-01-28 RX ORDER — CYCLOBENZAPRINE HCL 10 MG
10 TABLET ORAL
Qty: 20 TAB | Refills: 0 | OUTPATIENT
Start: 2019-01-28

## 2019-01-28 NOTE — TELEPHONE ENCOUNTER
Pt is calling Maria Guadalupe back states he spoke with on Friday and told her he would call back today

## 2019-01-28 NOTE — TELEPHONE ENCOUNTER
Patient states he last received cyclobenzaprine from [facility]. He states he is prescribed this medication by Dr. Giorgi Harrington for back pain. Patient will call him for refill.

## 2019-02-25 RX ORDER — SERTRALINE HYDROCHLORIDE 100 MG/1
TABLET, FILM COATED ORAL
Qty: 30 TAB | Refills: 11 | Status: SHIPPED | OUTPATIENT
Start: 2019-02-25 | End: 2019-06-10 | Stop reason: SDUPTHER

## 2019-03-06 RX ORDER — PRAVASTATIN SODIUM 40 MG/1
TABLET ORAL
Qty: 30 TAB | Refills: 11 | Status: SHIPPED | OUTPATIENT
Start: 2019-03-06 | End: 2019-03-22 | Stop reason: ALTCHOICE

## 2019-03-21 ENCOUNTER — OFFICE VISIT (OUTPATIENT)
Dept: FAMILY MEDICINE CLINIC | Age: 39
End: 2019-03-21

## 2019-03-21 VITALS
TEMPERATURE: 98.3 F | WEIGHT: 254 LBS | OXYGEN SATURATION: 96 % | HEART RATE: 90 BPM | SYSTOLIC BLOOD PRESSURE: 130 MMHG | DIASTOLIC BLOOD PRESSURE: 83 MMHG | BODY MASS INDEX: 37.62 KG/M2 | HEIGHT: 69 IN | RESPIRATION RATE: 18 BRPM

## 2019-03-21 DIAGNOSIS — M79.7 FIBROMYALGIA: ICD-10-CM

## 2019-03-21 DIAGNOSIS — I10 ESSENTIAL HYPERTENSION: ICD-10-CM

## 2019-03-21 DIAGNOSIS — Z79.4 TYPE 2 DIABETES MELLITUS WITH COMPLICATION, WITH LONG-TERM CURRENT USE OF INSULIN (HCC): Primary | ICD-10-CM

## 2019-03-21 DIAGNOSIS — E11.8 TYPE 2 DIABETES MELLITUS WITH COMPLICATION, WITH LONG-TERM CURRENT USE OF INSULIN (HCC): Primary | ICD-10-CM

## 2019-03-21 DIAGNOSIS — R00.2 PALPITATIONS: ICD-10-CM

## 2019-03-21 LAB
ALBUMIN UR QL STRIP: 80 MG/L
BILIRUB UR QL STRIP: NEGATIVE
CREATININE, URINE POC: 100 MG/DL
GLUCOSE UR-MCNC: NORMAL MG/DL
KETONES P FAST UR STRIP-MCNC: NEGATIVE MG/DL
MICROALBUMIN/CREAT RATIO POC: NORMAL MG/G
PH UR STRIP: 7 [PH] (ref 4.6–8)
PROT UR QL STRIP: NORMAL
SP GR UR STRIP: 1.01 (ref 1–1.03)
UA UROBILINOGEN AMB POC: NORMAL (ref 0.2–1)
URINALYSIS CLARITY POC: CLEAR
URINALYSIS COLOR POC: YELLOW
URINE BLOOD POC: NEGATIVE
URINE LEUKOCYTES POC: NEGATIVE
URINE NITRITES POC: NEGATIVE

## 2019-03-21 NOTE — PROGRESS NOTES
HPI  Sara Mims is a 35 y.o. male who presents feeling ill for the last 2 weeks. Complaining of anxiety. Elevated blood sugar    It has been 1 year since I have seen him. At that time he was taking insulin haphazardly. Was lost to follow-up to endocrinology. We got him taking his insulin more consistently and he worked on his diet but did not follow up with me after that. Reports that for the last year his fasting blood sugars have been in the mid 100s. The last 2 weeks he has had a respiratory infection, his blood sugar has increased into the 300s and 400s. This seems to have been associated with chills, hot flashes, dizziness. Separate a more chronic issue is anxiety that he feels is gotten worse over the last 6 months. He cannot think of any particular life stressors that started 6 months ago but in the last month he has had significant increase in his family stress and work stress. His mother is with him in the appointment today. She recognizes a lot of the symptoms of anxiety which she sees psychiatry and therapist for. He is taking Zoloft 100 mg and BuSpar 15 mg. He takes these every day. In general he might forget to take his medications once or twice per month over the weekend    PMHx:  Past Medical History:   Diagnosis Date    Chronic headaches 2007    Depression     Diabetes ([facility] [address]     GERD (gastroesophageal reflux disease)     Hypertension     Thyroid disease     hypothyroid    Unspecified sleep apnea     does not use CPAP       Meds:   Current Outpatient Medications   Medication Sig Dispense Refill    pravastatin (PRAVACHOL) 40 mg tablet TAKE 1 TABLET BY MOUTH NIGHTLY 30 Tab 11    sertraline (ZOLOFT) 100 mg tablet TAKE ONE TABLET BY MOUTH ONCE DAILY 30 Tab 11    butalbital-acetaminophen-caffeine (FIORICET, ESGIC) -40 mg per tablet TAKE 1 TABLET BY MOUTH AT THE ONSET OF HEADACHE. CAN TAKE TWICE DAILY BUT NOT MORE THAN 10 DAYS/MONTH.ONLY TO BE FILLED IN 1 MONTH INTERVALS 20 Tab 2    busPIRone (BUSPAR) 15 mg tablet TAKE ONE TABLET BY MOUTH TWICE DAILY 60 Tab 3    levothyroxine (SYNTHROID) 112 mcg tablet TAKE ONE TABLET BY MOUTH ONCE DAILY BEFORE BREAKFAST 90 Tab 3    lisinopril (PRINIVIL, ZESTRIL) 10 mg tablet TAKE ONE TABLET BY MOUTH ONCE DAILY 90 Tab 3    metFORMIN (GLUCOPHAGE) 500 mg tablet Take 1 Tab by mouth two (2) times daily (with meals). 180 Tab 3    fenofibrate (LOFIBRA) 160 mg tablet Take 1 Tab by mouth daily. 90 Tab 3    amitriptyline (ELAVIL) 25 mg tablet Take 1 Tab by mouth nightly. 12.5 mg p.o. nightly for 1 week and then 25 mg p.o. nightly 30 Tab 5    cyclobenzaprine (FLEXERIL) 10 mg tablet Take 1 Tab by mouth three (3) times daily (with meals). Indications: MUSCLE SPASM 20 Tab 0    insulin NPH (NOVOLIN N, HUMULIN N) 100 unit/mL injection 100 units twice daily 1 Vial 12    insulin regular (NOVOLIN R, HUMULIN R) 100 unit/mL injection 15-20 units TID AC (Patient taking differently: 15-20 units TID AC  Indications: 0-20u BID, per pt) 1 Vial 12    naproxen (NAPROSYN) 500 mg tablet Take 1 Tab by mouth every twelve (12) hours as needed for Pain. 20 Tab 0       Allergies: Allergies   Allergen Reactions    Morphine Nausea and Vomiting    Penicillin G Swelling    Percocet [Oxycodone-Acetaminophen] Hives and Nausea and Vomiting    Sulfa (Sulfonamide Antibiotics) Swelling    Tramadol Nausea Only     Nausea and headache         Smoker:  Social History     Tobacco Use   Smoking Status Former Smoker    Packs/day: 0.00    Years: 14.00    Pack years: 0.00    Last attempt to quit: 2014    Years since quittin.2   Smokeless Tobacco Never Used       ETOH:   Social History     Substance and Sexual Activity   Alcohol Use No       FH:   Family History   Problem Relation Age of Onset    Diabetes Mother     Heart Disease Father     Cancer Father     Diabetes Father     Diabetes Paternal Aunt        ROS:   As listed in HPI.  In addition:  Constitutional:   No headache, fever, fatigue, weight loss or weight gain      Cardiac:    No chest pain      Resp:   No cough or shortness of breath      Neuro   No loss of consciousness, dizziness, seizures      Physical Exam:  Blood pressure 130/83, pulse 90, temperature 98.3 °F (36.8 °C), temperature source Oral, resp. rate 18, height 5' 9\" (1.753 m), weight 254 lb (115.2 kg), SpO2 96 %. GEN: No apparent distress. Alert and oriented and responds to all questions appropriately. NEUROLOGIC:  No focal neurologic deficits. Strength and sensation grossly intact. Coordination and gait grossly intact. EXT: Well perfused. No edema. SKIN: No obvious rashes. Lungs clear to auscultation bilaterally  CV regular rate and rhythm no murmur  HEENT clear tympanic members clear his mucosa clear oromucosa         Assessment and Plan     Respiratory illness? Described cough congestion but says that he is feeling better. Nothing on physical exam.  Expect this to continue to improve    Diabetes  Elevated recently because of illness. Reports his blood sugar was 350 this morning. No ketones in his urine  He will continue his current insulin regime   units a.m. and p.m. Regular 10 units with meals    Plans to see endocrinology-Dr. Hay Nieto . No appointment yet    Palpitations  EKG is NSR. Likely due to illness, elevated blood sugar etc.  CMP    Anxiety  Taking Zoloft 100 mg  BuSpar  Anxiety getting worse due to situational and life stressors. Needs to see a counselor    Discussed the general need to be more mindful of his health. We talked about the effect of diabetes on the body. Emphasized the importance of regular follow-up    Glad that he has decided to see an endocrinologist.  He feels that his insurance is sufficiently good for him to be seeing Dr. Montanez More regularly    Will likely have him follow-up with me soon but will base this on blood work      ICD-10-CM ICD-9-CM    1.  Type 2 diabetes mellitus with complication, with long-term current use of insulin (HCC) E11.8 250.90 HEMOGLOBIN A1C WITH EAG    Z79.4 V58.67 AMB POC URINE, MICROALBUMIN, SEMIQUANT (3 RESULTS)      AMB POC URINALYSIS DIP STICK AUTO W/O MICRO      REFERRAL TO ENDOCRINOLOGY   2. Palpitations R00.2 785.1 AMB POC EKG ROUTINE W/ 12 LEADS, INTER & REP   3. Essential hypertension I10 401.9 TSH 3RD GENERATION      METABOLIC PANEL, COMPREHENSIVE      LIPID PANEL      CBC WITH AUTOMATED DIFF   4. Fibromyalgia M79.7 729.1        AVS given. Pt expressed understanding of instructions    This was a 40-minute visit. Greater than 50% of the time was spent counseling the patient on diabetes and anxiety.

## 2019-03-21 NOTE — PROGRESS NOTES
Chief Complaint   Patient presents with    Chills     and hot flashes     Fatigue    Anxiety    Dizziness    Chest Pain     not really a pain but didnt feel right      Health Maintenance reviewed     1. Have you been to the ER, urgent care clinic since your last visit? Hospitalized since your last visit? Yes, hand surgery Dr. Sydni Nick      2. Have you seen or consulted any other health care providers outside of the [address] [place] [address] since your last visit? Include any pap smears or colon screening.   Yes,  Jayro Nick for hand pain

## 2019-03-21 NOTE — LETTER
NOTIFICATION RETURN TO WORK / SCHOOL 
 
3/21/2019 12:21 PM 
 
Mr. Tess Eldridge [address] [id number] To Whom It May Concern: 
 
Tess Eldridge is currently under the care of [address] [place] [address]. He was seen today, March 21, 2019. If there are questions or concerns please have the patient contact our office. Sincerely, Evie Olmos MD

## 2019-03-22 ENCOUNTER — TELEPHONE (OUTPATIENT)
Dept: FAMILY MEDICINE CLINIC | Age: 39
End: 2019-03-22

## 2019-03-22 LAB
ALBUMIN SERPL-MCNC: 4.5 G/DL (ref 3.5–5.5)
ALBUMIN/GLOB SERPL: 1.6 {RATIO} (ref 1.2–2.2)
ALP SERPL-CCNC: 85 IU/L (ref 39–117)
ALT SERPL-CCNC: 37 IU/L (ref 0–44)
AST SERPL-CCNC: 19 IU/L (ref 0–40)
BASOPHILS # BLD AUTO: 0 X10E3/UL (ref 0–0.2)
BASOPHILS NFR BLD AUTO: 0 %
BILIRUB SERPL-MCNC: 0.4 MG/DL (ref 0–1.2)
BUN SERPL-MCNC: 15 MG/DL (ref 6–24)
BUN/CREAT SERPL: 15 (ref 9–20)
CALCIUM SERPL-MCNC: 9.4 MG/DL (ref 8.7–10.2)
CHLORIDE SERPL-SCNC: 97 MMOL/L (ref 96–106)
CHOLEST SERPL-MCNC: 252 MG/DL (ref 100–199)
CO2 SERPL-SCNC: 23 MMOL/L (ref 20–29)
CREAT SERPL-MCNC: 1.03 MG/DL (ref 0.76–1.27)
EOSINOPHIL # BLD AUTO: 0.2 X10E3/UL (ref 0–0.4)
EOSINOPHIL NFR BLD AUTO: 3 %
ERYTHROCYTE [DISTWIDTH] IN BLOOD BY AUTOMATED COUNT: 13.7 % (ref 12.3–15.4)
EST. AVERAGE GLUCOSE BLD GHB EST-MCNC: 255 MG/DL
GLOBULIN SER CALC-MCNC: 2.9 G/DL (ref 1.5–4.5)
GLUCOSE SERPL-MCNC: 355 MG/DL (ref 65–99)
HBA1C MFR BLD: 10.5 % (ref 4.8–5.6)
HCT VFR BLD AUTO: 41 % (ref 37.5–51)
HDLC SERPL-MCNC: 31 MG/DL
HGB BLD-MCNC: 14.2 G/DL (ref 13–17.7)
IMM GRANULOCYTES # BLD AUTO: 0.1 X10E3/UL (ref 0–0.1)
IMM GRANULOCYTES NFR BLD AUTO: 2 %
INTERPRETATION, 910389: NORMAL
LDLC SERPL CALC-MCNC: ABNORMAL MG/DL (ref 0–99)
LYMPHOCYTES # BLD AUTO: 3.1 X10E3/UL (ref 0.7–3.1)
LYMPHOCYTES NFR BLD AUTO: 36 %
Lab: NORMAL
MCH RBC QN AUTO: 29.6 PG (ref 26.6–33)
MCHC RBC AUTO-ENTMCNC: 34.6 G/DL (ref 31.5–35.7)
MCV RBC AUTO: 85 FL (ref 79–97)
MONOCYTES # BLD AUTO: 0.6 X10E3/UL (ref 0.1–0.9)
MONOCYTES NFR BLD AUTO: 7 %
NEUTROPHILS # BLD AUTO: 4.5 X10E3/UL (ref 1.4–7)
NEUTROPHILS NFR BLD AUTO: 52 %
PLATELET # BLD AUTO: 281 X10E3/UL (ref 150–379)
POTASSIUM SERPL-SCNC: 4.5 MMOL/L (ref 3.5–5.2)
PROT SERPL-MCNC: 7.4 G/DL (ref 6–8.5)
RBC # BLD AUTO: 4.8 X10E6/UL (ref 4.14–5.8)
SODIUM SERPL-SCNC: 135 MMOL/L (ref 134–144)
TRIGL SERPL-MCNC: 553 MG/DL (ref 0–149)
TSH SERPL DL<=0.005 MIU/L-ACNC: 3.04 UIU/ML (ref 0.45–4.5)
VLDLC SERPL CALC-MCNC: ABNORMAL MG/DL (ref 5–40)
WBC # BLD AUTO: 8.5 X10E3/UL (ref 3.4–10.8)

## 2019-03-22 RX ORDER — ROSUVASTATIN CALCIUM 20 MG/1
20 TABLET, COATED ORAL
Qty: 90 TAB | Refills: 3 | Status: ON HOLD | OUTPATIENT
Start: 2019-03-22 | End: 2019-12-03 | Stop reason: SDUPTHER

## 2019-03-22 NOTE — TELEPHONE ENCOUNTER
Triglycerides very high. Start more powerful statin-Crestor 20 mg. Stop pravastatin. Make sure patient is taking fenofibrate.     Please follow-up in 3 weeks with blood sugar log written out    Please make appointment with endocrinologist

## 2019-03-28 ENCOUNTER — TELEPHONE (OUTPATIENT)
Dept: FAMILY MEDICINE CLINIC | Age: 39
End: 2019-03-28

## 2019-03-28 NOTE — TELEPHONE ENCOUNTER
From [place]:    Pt is returning a call from the practice and is unsure what the call is about. Callback: [phone].

## 2019-03-28 NOTE — TELEPHONE ENCOUNTER
Ashley Brii Martinez notified Dr. Jonathan Wen said Triglycerides very high. Start more powerful statin-Crestor 20 mg. Stop pravastatin.   Make sure patient is taking fenofibrate.     Please follow-up in 3 weeks with blood sugar log written out     Please make appointment with endocrinologist  He voiced understanding but is out of town so he said as soon as he gets back he will start the crestor and stop the pravastatin

## 2019-04-18 DIAGNOSIS — G43.009 MIGRAINE WITHOUT AURA AND WITHOUT STATUS MIGRAINOSUS, NOT INTRACTABLE: ICD-10-CM

## 2019-04-19 RX ORDER — BUTALBITAL, ACETAMINOPHEN AND CAFFEINE 50; 325; 40 MG/1; MG/1; MG/1
TABLET ORAL
Qty: 20 TAB | Refills: 2 | Status: SHIPPED | OUTPATIENT
Start: 2019-04-19 | End: 2019-09-17 | Stop reason: SDUPTHER

## 2019-04-25 ENCOUNTER — DOCUMENTATION ONLY (OUTPATIENT)
Dept: FAMILY MEDICINE CLINIC | Age: 39
End: 2019-04-25

## 2019-04-27 DIAGNOSIS — E03.9 ACQUIRED HYPOTHYROIDISM: ICD-10-CM

## 2019-04-27 DIAGNOSIS — I10 ESSENTIAL HYPERTENSION: Chronic | ICD-10-CM

## 2019-04-29 RX ORDER — LISINOPRIL 10 MG/1
TABLET ORAL
Qty: 90 TAB | Refills: 3 | Status: SHIPPED | OUTPATIENT
Start: 2019-04-29 | End: 2019-10-17 | Stop reason: SDUPTHER

## 2019-04-29 RX ORDER — LEVOTHYROXINE SODIUM 112 UG/1
TABLET ORAL
Qty: 90 TAB | Refills: 3 | Status: SHIPPED | OUTPATIENT
Start: 2019-04-29 | End: 2020-05-05 | Stop reason: SDUPTHER

## 2019-05-15 ENCOUNTER — OFFICE VISIT (OUTPATIENT)
Dept: FAMILY MEDICINE CLINIC | Age: 39
End: 2019-05-15

## 2019-05-15 VITALS
BODY MASS INDEX: 37.62 KG/M2 | SYSTOLIC BLOOD PRESSURE: 138 MMHG | TEMPERATURE: 98.2 F | HEIGHT: 69 IN | WEIGHT: 254 LBS | DIASTOLIC BLOOD PRESSURE: 89 MMHG | HEART RATE: 88 BPM | OXYGEN SATURATION: 96 % | RESPIRATION RATE: 18 BRPM

## 2019-05-15 DIAGNOSIS — E66.01 SEVERE OBESITY (HCC): ICD-10-CM

## 2019-05-15 DIAGNOSIS — L23.7 POISON IVY DERMATITIS: Primary | ICD-10-CM

## 2019-05-15 RX ORDER — METHYLPREDNISOLONE ACETATE 80 MG/ML
80 INJECTION, SUSPENSION INTRA-ARTICULAR; INTRALESIONAL; INTRAMUSCULAR; SOFT TISSUE ONCE
Qty: 1 VIAL | Refills: 0
Start: 2019-05-15 | End: 2019-05-15

## 2019-05-15 NOTE — PROGRESS NOTES
Chief Complaint   Patient presents with    Rash     all over arms and upper back     Poison Ivy/Poison Oak/Poison Sumac Exposure     Health Maintenance reviewed     1. Have you been to the ER, urgent care clinic since your last visit? Hospitalized since your last visit? No    2. Have you seen or consulted any other health care providers outside of the [address] [place] [address] since your last visit? Include any pap smears or colon screening.  No

## 2019-05-15 NOTE — PROGRESS NOTES
Chief Complaint   Patient presents with    Rash     all over arms and upper back     Poison Ivy/Poison Oak/Poison Sumac Exposure     Patient reports that he was exposed to multiple plants while weed [place] the side of the highway 2 days ago. Patient reports that since then rash is spread from his arms to his chest and his back. Patient has been using an over-the-counter cream with some relief of symptoms, but it is not stop the spread of the rash. Subjective: (As above and below)     Chief Complaint   Patient presents with    Rash     all over arms and upper back     Poison Ivy/Poison Oak/Poison Sumac Exposure     he is a 35y.o. year old male who presents for evaluation. Reviewed PmHx, RxHx, FmHx, SocHx, AllgHx and updated in chart. Review of Systems - negative except as listed above    Objective:     Vitals:    05/15/19 1356   BP: 138/89   Pulse: 88   Resp: 18   Temp: 98.2 °F (36.8 °C)   TempSrc: Oral   SpO2: 96%   Weight: 254 lb (115.2 kg)   Height: 5' 9\" (1.753 m)     Physical Examination: General appearance - alert, well appearing, and in no distress  Mental status - normal mood, behavior, speech, dress, motor activity, and thought processes  Mouth - mucous membranes moist, pharynx normal without lesions  Chest - clear to auscultation, no wheezes, rales or rhonchi, symmetric air entry  Heart - normal rate, regular rhythm, normal S1, S2, no murmurs, rubs, clicks or gallops  Skin - vesicular rash in streaks on arms, chest and back    Assessment/ Plan:   1. Severe obesity ([address]  -Continue to work on diet and exercise    2. Poison ivy dermatitis  -Injection given in office today, advised patient if symptoms are not mostly resolved in 3 to 4 days to call office for oral supplement  - METHYLPREDNISOLONE ACETATE INJECTION 80 MG  - RI THER/PROPH/DIAG INJECTION, SUBCUT/IM  - methylPREDNISolone acetate (DEPO-MEDROL) 80 mg/mL injection; 1 mL by IntraMUSCular route once for 1 dose.   Dispense: 1 Vial; Refill: 0     Follow-up as needed  I have discussed the diagnosis with the patient and the intended plan as seen in the above orders. The patient has received an after-visit summary and questions were answered concerning future plans.      Medication Side Effects and Warnings were discussed with patient: yes  Patient Labs were reviewed: yes  Patient Past Records were reviewed:  yes    Lalo Kelly M.D.

## 2019-05-31 RX ORDER — BUSPIRONE HYDROCHLORIDE 15 MG/1
TABLET ORAL
Qty: 60 TAB | Refills: 3 | Status: SHIPPED | OUTPATIENT
Start: 2019-05-31 | End: 2019-08-26 | Stop reason: SDUPTHER

## 2019-06-10 ENCOUNTER — TELEPHONE (OUTPATIENT)
Dept: FAMILY MEDICINE CLINIC | Age: 39
End: 2019-06-10

## 2019-06-10 ENCOUNTER — OFFICE VISIT (OUTPATIENT)
Dept: FAMILY MEDICINE CLINIC | Age: 39
End: 2019-06-10

## 2019-06-10 VITALS
WEIGHT: 248 LBS | OXYGEN SATURATION: 97 % | DIASTOLIC BLOOD PRESSURE: 81 MMHG | BODY MASS INDEX: 36.73 KG/M2 | HEIGHT: 69 IN | SYSTOLIC BLOOD PRESSURE: 138 MMHG | RESPIRATION RATE: 17 BRPM | HEART RATE: 86 BPM | TEMPERATURE: 98.2 F

## 2019-06-10 DIAGNOSIS — Z79.4 TYPE 2 DIABETES MELLITUS WITH COMPLICATION, WITH LONG-TERM CURRENT USE OF INSULIN (HCC): Chronic | ICD-10-CM

## 2019-06-10 DIAGNOSIS — E11.8 TYPE 2 DIABETES MELLITUS WITH COMPLICATION, WITH LONG-TERM CURRENT USE OF INSULIN (HCC): Chronic | ICD-10-CM

## 2019-06-10 DIAGNOSIS — F41.9 ANXIETY: Primary | ICD-10-CM

## 2019-06-10 DIAGNOSIS — I10 ESSENTIAL HYPERTENSION: Chronic | ICD-10-CM

## 2019-06-10 RX ORDER — SERTRALINE HYDROCHLORIDE 100 MG/1
200 TABLET, FILM COATED ORAL DAILY
Qty: 60 TAB | Refills: 11 | Status: SHIPPED | OUTPATIENT
Start: 2019-06-10 | End: 2019-08-26 | Stop reason: SDUPTHER

## 2019-06-10 RX ORDER — HYDROXYZINE 25 MG/1
25 TABLET, FILM COATED ORAL
Qty: 90 TAB | Refills: 2 | Status: SHIPPED | OUTPATIENT
Start: 2019-06-10 | End: 2019-06-17 | Stop reason: ALTCHOICE

## 2019-06-10 RX ORDER — HYDROXYZINE PAMOATE 25 MG/1
25 CAPSULE ORAL
Qty: 90 CAP | Refills: 1 | Status: SHIPPED | OUTPATIENT
Start: 2019-06-10 | End: 2019-06-10 | Stop reason: ALTCHOICE

## 2019-06-10 NOTE — PATIENT INSTRUCTIONS
[address] [place]                 [address]    Call [address] [facility]  tel:[phone]

## 2019-06-10 NOTE — LETTER
NOTIFICATION RETURN TO WORK  
 
6/10/2019 10:04 AM 
 
[facility] [address] [facility] [id number] To Whom It May Concern: 
 
Caity Arthur is currently under the care of [address] [place] [address]. Please excuse patient from work 6/10/19-6/14/19 due to medical illness. If there are questions or concerns please have the patient contact our office. Sincerely, Myleskey Elliott MD

## 2019-06-10 NOTE — PROGRESS NOTES
Chief Complaint   Patient presents with   [facility] Follow Up     Pt reports that last Wednesday he food out of town, developed nausea/vomiting and diarrhea. Pt reports that he lost his appetite, then felt like things were closing in on him, felt light headed, chills. He was coming home last Friday, ended up stopping at a hospital in [place] due to feeling like he couldn't breathe. He was evaluated with normal testing of his heart and labs. Pt was given ativan which resolved symptoms and was diagnosed with anxiety. Pt reports that he has been under a lot of stress lately, both work and home, has been struggling with anxiety symptoms, feeling tearful. Pt reports that it has never been this bad in his life before. Pt reports that he has had issues in restaurants, not been able to stay, needed to leave due to anxiety. Pt reports that he last weight due to symptoms and lack of appetite. Pt is on sertraline and buspar. Subjective: (As above and below)     Chief Complaint   Patient presents with   [facility] Follow Up     he is a 35y.o. year old male who presents for evaluation. Reviewed PmHx, RxHx, FmHx, SocHx, AllgHx and updated in chart.     Review of Systems - negative except as listed above    Objective:     Vitals:    06/10/19 0921   BP: 138/81   Pulse: 86   Resp: 17   Temp: 98.2 °F (36.8 °C)   TempSrc: Oral   SpO2: 97%   Weight: 248 lb (112.5 kg)   Height: 5' 9\" (1.753 m)     Physical Examination: General appearance - alert, well appearing, and in no distress  Mental status - normal mood, behavior, speech, dress, motor activity, and thought processes  Mouth - mucous membranes moist, pharynx normal without lesions  Chest - clear to auscultation, no wheezes, rales or rhonchi, symmetric air entry  Heart - normal rate, regular rhythm, normal S1, S2, no murmurs, rubs, clicks or gallops  Musculoskeletal - no joint tenderness, deformity or swelling  Extremities - peripheral pulses normal, no pedal edema, no clubbing or cyanosis    Assessment/ Plan:   1. Type 2 diabetes mellitus with complication, with long-term current use of insulin (HCC)  -pt has an endo appt in one week    2. Essential hypertension  -well controlled    3. Anxiety  -pt requested time off from work due to increased stress, approved 5 days off to allow for medication changes. -increase zoloft, add vistaril as needed for acute anxiety  - hydrOXYzine pamoate (VISTARIL) 25 mg capsule; Take 1 Cap by mouth three (3) times daily as needed for Anxiety for up to 14 days. Dispense: 90 Cap; Refill: 1  - sertraline (ZOLOFT) 100 mg tablet; Take 2 Tabs by mouth daily. Dispense: 60 Tab; Refill: 11     Follow up in one month or as needed. I have discussed the diagnosis with the patient and the intended plan as seen in the above orders. The patient has received an after-visit summary and questions were answered concerning future plans.      Medication Side Effects and Warnings were discussed with patient: yes  Patient Labs were reviewed: yes  Patient Past Records were reviewed:  yes    Brenda Cole M.D.

## 2019-06-10 NOTE — PROGRESS NOTES
Chief Complaint   Patient presents with   [facility] Follow Up     1. Have you been to the ER, urgent care clinic since your last visit? Hospitalized since your last visit? Yes, ER- [facility]    2. Have you seen or consulted any other health care providers outside of the [address] [place] [address] since your last visit? Include any pap smears or colon screening.  No    Health Maintenance reviewed

## 2019-06-12 ENCOUNTER — TELEPHONE (OUTPATIENT)
Dept: FAMILY MEDICINE CLINIC | Age: 39
End: 2019-06-12

## 2019-06-12 NOTE — TELEPHONE ENCOUNTER
Pt is calling stating he saw Dr Heller and he wants to speak with  in ref to questions he has about med prescribed

## 2019-06-13 NOTE — TELEPHONE ENCOUNTER
Called pt and verified name and . Pt voiced that he was only taking his buspar once a day when the bottle says to take 1 tablet twice a day. Voiced to pt to continue to take his medication as prescribed twice a day per Dr. Ada Arellano. He voiced understanding. Pt has a f/u in one month.

## 2019-06-14 ENCOUNTER — TELEPHONE (OUTPATIENT)
Dept: FAMILY MEDICINE CLINIC | Age: 39
End: 2019-06-14

## 2019-06-14 NOTE — TELEPHONE ENCOUNTER
Pt returned call, verified name and . Pt notified to decrease dose per Dr. Kelsea Elizondo, he voiced understanding.

## 2019-06-14 NOTE — TELEPHONE ENCOUNTER
Called pt and verified name and . Pt voiced that this medication (zoloft) increase is making him tired and he wants to know if he can cut one of his pills in half. Is this ok to do? Please advise.

## 2019-06-14 NOTE — TELEPHONE ENCOUNTER
Patient calling to speak to a nurse about med he was prescribed on Monday. He says this med is making him very tired.

## 2019-06-17 ENCOUNTER — OFFICE VISIT (OUTPATIENT)
Dept: ENDOCRINOLOGY | Age: 39
End: 2019-06-17

## 2019-06-17 ENCOUNTER — OFFICE VISIT (OUTPATIENT)
Dept: FAMILY MEDICINE CLINIC | Age: 39
End: 2019-06-17

## 2019-06-17 VITALS
BODY MASS INDEX: 36.4 KG/M2 | HEIGHT: 69 IN | WEIGHT: 245.8 LBS | DIASTOLIC BLOOD PRESSURE: 75 MMHG | HEART RATE: 88 BPM | SYSTOLIC BLOOD PRESSURE: 114 MMHG

## 2019-06-17 VITALS
HEART RATE: 85 BPM | DIASTOLIC BLOOD PRESSURE: 84 MMHG | HEIGHT: 69 IN | RESPIRATION RATE: 18 BRPM | SYSTOLIC BLOOD PRESSURE: 133 MMHG | WEIGHT: 249 LBS | BODY MASS INDEX: 36.88 KG/M2 | OXYGEN SATURATION: 97 % | TEMPERATURE: 98.3 F

## 2019-06-17 DIAGNOSIS — Z79.4 TYPE 2 DIABETES MELLITUS WITH COMPLICATION, WITH LONG-TERM CURRENT USE OF INSULIN (HCC): Primary | Chronic | ICD-10-CM

## 2019-06-17 DIAGNOSIS — E03.9 ACQUIRED HYPOTHYROIDISM: ICD-10-CM

## 2019-06-17 DIAGNOSIS — E11.8 TYPE 2 DIABETES MELLITUS WITH COMPLICATION, WITH LONG-TERM CURRENT USE OF INSULIN (HCC): Primary | Chronic | ICD-10-CM

## 2019-06-17 DIAGNOSIS — R55 POSTURAL DIZZINESS WITH PRESYNCOPE: ICD-10-CM

## 2019-06-17 DIAGNOSIS — I10 ESSENTIAL HYPERTENSION: Chronic | ICD-10-CM

## 2019-06-17 DIAGNOSIS — R42 POSTURAL DIZZINESS WITH PRESYNCOPE: ICD-10-CM

## 2019-06-17 DIAGNOSIS — F41.9 ANXIETY: ICD-10-CM

## 2019-06-17 DIAGNOSIS — E10.9 TYPE 1 DIABETES MELLITUS WITHOUT COMPLICATION (HCC): Primary | ICD-10-CM

## 2019-06-17 RX ORDER — PROPRANOLOL HYDROCHLORIDE 20 MG/1
20 TABLET ORAL 2 TIMES DAILY
Qty: 60 TAB | Refills: 2 | Status: SHIPPED | OUTPATIENT
Start: 2019-06-17 | End: 2019-06-17

## 2019-06-17 NOTE — PROGRESS NOTES
Chief Complaint   Patient presents with    Diabetes     pcp and pharmacy verified   Records reviewed  History of Present Illness: Adam Mc is a 35 y.o. male who I was asked to see in consult by Dr. Brenda Cole, for evaluation of Type 1 DM. Pt was previously followed by Dr. Yareli Feliciano for many years and then saw Dr. Anahi Sahni in 2016. At that time he was taking  units BID and Regular insulin 15-20 units with each meal (but he admitted to not taking his R consistently). He never kept any follow up with Dr. Anahi Sahni and has not been seen by and Endocrinologist since 2016. Pt was diagnosed with DM at the age of 8. Current regimen is Novolin N  units twice per day and Novolin R 0-20, \"based on what I eat and what my sugars are\". Checks blood sugars 2-3 times per day. His FBGs run in the 200-300's range with BGs in the 's range during the day. The lower BGs were days he skipped lunch and his BGs will decrease. Pt notes that a couple of weeks ago he got food poisoning and \"I have not been myself since then\". He notes his PO intake has been lower. Most recent Hgb A1c was 10.5% in March 2019. Pt reports that he has been having feelings of \"my throat closing up on me and feeling dizzy, I am worried about my veins getting clogged up from the DM. \"    A typical day is as follows:  Pt wakes around 530AM to be at work by [employer].  - breakfast: He has his breakfast on the way to work and takes his insulin and pills when he gets to work. His ride to work is around 45min. He eats breakfast around 730-740AM, he will take his insulin about 15 minutes AFTER he finishes eating, when he gets to work. He takes his NPH and R at work at the same time. He states that he NEVER misses this dose. - He notes that he will snack on crackers, and cookies during the day. - lunch: He has lunch 2-3 days per week. He does not always check his BG at work. He does not consistently take his R insulin, when he does eat.   - dinner: He has dinner around 6-9PM, last night he had steak, asparagus and diet soda. He does not always take his R insulin with diner.  - He will take his second dose of NPH around 10PM, before he is ready to go to bed. Exercise consists of working on cars, caring for his son. He notes his job is physically active. Caring for his parents. No history of vascular disease. Pt reports that he does have neuropathy. No hx of nephropathy. Last eye exam was \"I can not recall when the last time I saw an eye doctor\". Pt has hx of hypothyroidism, since the age of 11-9 years old. He is currently taking 112mcg daily. Past Medical History:   Diagnosis Date    Chronic headaches 2007    Depression     Diabetes ([address]     GERD (gastroesophageal reflux disease)     Hypertension     Thyroid disease     hypothyroid    Unspecified sleep apnea     does not use CPAP     Past Surgical History:   Procedure Laterality Date    HX CHOLECYSTECTOMY  8/26/14    Dr Chauncey Coyle    HX HEENT      wisdom teeth removed    HX ORTHOPAEDIC Left 2012    carpel tunnel     Current Outpatient Medications   Medication Sig    insulin NPH (NOVOLIN N NPH U-100 INSULIN) 100 unit/mL injection by SubCUTAneous route once. 800-100 units twice a day    sertraline (ZOLOFT) 100 mg tablet Take 2 Tabs by mouth daily. (Patient taking differently: Take 100 mg by mouth daily.)    busPIRone (BUSPAR) 15 mg tablet TAKE 1 TABLET BY MOUTH TWICE DAILY    lisinopril (PRINIVIL, ZESTRIL) 10 mg tablet TAKE 1 TABLET BY MOUTH ONCE DAILY    levothyroxine (SYNTHROID) 112 mcg tablet TAKE 1 TABLET BY MOUTH ONCE DAILY BEFORE BREAKFAST    butalbital-acetaminophen-caffeine (FIORICET, ESGIC) -40 mg per tablet TAKE 1 TABLET BY MOUTH AT THE ONSET OF HEADACHE. CAN TAKE TWICE DAILY BUT NOT MORE THAN 10 DAYS/MONTH.ONLY TO BE FILLED IN 1 MONTH INTERVALS    fenofibrate (LOFIBRA) 160 mg tablet TAKE 1 TABLET BY MOUTH ONCE DAILY    rosuvastatin (CRESTOR) 20 mg tablet Take 1 Tab by mouth nightly.  metFORMIN (GLUCOPHAGE) 500 mg tablet Take 1 Tab by mouth two (2) times daily (with meals).  insulin regular (NOVOLIN R, HUMULIN R) 100 unit/mL injection 15-20 units TID AC (Patient taking differently: 15-20 units 2-3 times per day  Indications: 0-20u BID, per pt)     No current facility-administered medications for this visit.       Allergies   Allergen Reactions    Morphine Nausea and Vomiting    Penicillin G Swelling    Percocet [Oxycodone-Acetaminophen] Hives and Nausea and Vomiting    Sulfa (Sulfonamide Antibiotics) Swelling    Tramadol Nausea Only     Nausea and headache       Family History   Problem Relation Age of Onset    Diabetes Mother     Heart Disease Father     Cancer Father     Diabetes Father     Diabetes Paternal Aunt     Diabetes Maternal Grandmother     Thyroid Cancer Maternal Grandmother     Kidney Disease Maternal Grandmother     Arthritis Maternal Grandmother     Heart Disease Maternal Grandfather     High Cholesterol Maternal Grandfather     Hypertension Maternal Grandfather     Diabetes Paternal Grandmother     Hemophilia Paternal Grandfather      Social History     Socioeconomic History    Marital status:      Spouse name: Not on file    Number of children: Not on file    Years of education: Not on file    Highest education level: Not on file   Occupational History    Not on file   Social Needs    Financial resource strain: Not on file    Food insecurity:     Worry: Not on file     Inability: Not on file    Transportation needs:     Medical: Not on file     Non-medical: Not on file   Tobacco Use    Smoking status: Former Smoker     Packs/day: 0.00     Years: 14.00     Pack years: 0.00     Last attempt to quit: 2014     Years since quittin.4    Smokeless tobacco: Never Used   Substance and Sexual Activity    Alcohol use: No    Drug use: No    Sexual activity: Not on file   Lifestyle    Physical activity:     Days per week: Not on file     Minutes per session: Not on file    Stress: Not on file   Relationships    Social connections:     Talks on phone: Not on file     Gets together: Not on file     Attends [employer] service: Not on file     Active member of club or organization: Not on file     Attends meetings of clubs or organizations: Not on file     Relationship status: Not on file    Intimate partner violence:     Fear of current or ex partner: Not on file     Emotionally abused: Not on file     Physically abused: Not on file     Forced sexual activity: Not on file   Other Topics Concern    Not on file   Social History Narrative    Not on file     Review of Systems:  - Constitutional Symptoms: no fevers, chills, weight loss  - Eyes: no blurry vision or double vision  - Cardiovascular: no chest pain or palpitations  - Respiratory: no cough or shortness of breath  - Gastrointestinal: no dysphagia or abdominal pain  - Musculoskeletal: no joint pains or weakness  - Integumentary: no rashes  - Neurological: no numbness, tingling, or headaches  - Psychiatric: no depression or anxiety  - Endocrine: no heat or cold intolerance, no polyuria or polydipsia    Physical Examination:  Blood pressure 114/75, pulse 88, height 5' 9\" (1.753 m), weight 245 lb 12.8 oz (111.5 kg).   - General: pleasant, no distress, good eye contact  - HEENT: no exopthalmos, no periorbital edema, no scleral/conjunctival injection, EOMI, no lid lag or stare  - Neck: supple, no thyromegaly, masses, lymph nodes, or carotid bruits, no supraclavicular or dorsocervical fat pads  - Cardiovascular: regular, normal rate, normal S1 and S2, no murmurs/rubs/gallops, 2+ dorsalis pedis pulses bilaterally  - Respiratory: clear to auscultation bilaterally  - Gastrointestinal: soft, nontender, nondistended, no masses, no hepatosplenomegaly  - Musculoskeletal: no proximal muscle weakness in upper or lower extremities  - Integumentary: no acanthosis nigricans, no abdominal striae, no rashes, no edema, no foot ulcers  - Neurological: DTR 2+, no tremors  - Psychiatric: normal mood and affect    Diabetic foot exam:     Left Foot:   Visual Exam: callous - present   Pulse DP: 2+ (normal)   Filament test: normal sensation    Vibratory sensation: normal      Right Foot:   Visual Exam: callous - present   Pulse DP: 2+ (normal)   Filament test: normal sensation    Vibratory sensation: normal        Data Reviewed:   Component      Latest Ref Rng & Units 3/21/2019 3/21/2019 3/21/2019 3/21/2019          12:07 PM 11:58 AM 11:58 AM 11:58 AM   Glucose      65 - 99 mg/dL    355 (H)   BUN      6 - 24 mg/dL    15   Creatinine      0.76 - 1.27 mg/dL    1.03   GFR est non-AA      >59 mL/min/1.73    90   GFR est AA      >59 mL/min/1.73    105   BUN/Creatinine ratio      9 - 20    15   Sodium      134 - 144 mmol/L    135   Potassium      3.5 - 5.2 mmol/L    4.5   Chloride      96 - 106 mmol/L    97   CO2      20 - 29 mmol/L    23   Calcium      8.7 - 10.2 mg/dL    9.4   Protein, total      6.0 - 8.5 g/dL    7.4   Albumin      3.5 - 5.5 g/dL    4.5   GLOBULIN, TOTAL      1.5 - 4.5 g/dL    2.9   A-G Ratio      1.2 - 2.2    1.6   Bilirubin, total      0.0 - 1.2 mg/dL    0.4   Alk.  phosphatase      39 - 117 IU/L    85   AST      0 - 40 IU/L    19   ALT (SGPT)      0 - 44 IU/L    37   Cholesterol, total      100 - 199 mg/dL   252 (H)    Triglyceride      0 - 149 mg/dL   553 (HH)    HDL Cholesterol      >39 mg/dL   31 (L)    VLDL, calculated      5 - 40 mg/dL   Comment    LDL, calculated      0 - 99 mg/dL   Comment    ALBUMIN, URINE POC      Negative mg/L 80      CREATININE, URINE POC      mg/dL 100      Microalbumin/creat ratio (POC)      <30 MG/G       Hemoglobin A1c, (calculated)      4.8 - 5.6 %  10.5 (H)     Estimated average glucose      mg/dL  255       Component      Latest Ref Rng & Units 3/21/2019          11:58 AM   TSH      0.450 - 4.500 uIU/mL 3.040 Assessment/Plan:   1. Type 1 diabetes mellitus without complication ([facility] [address]    2. Acquired hypothyroidism    3. Postural dizziness with presyncope    1) We discussed at length the importance of taking his R WITH EACH MEAL. Pt voices understanding, but notes this is a struggle. His higher doses of NPH seem to be trying to \"cover\" his lunch and dinner, but I explained that this will not work to control his BGs and puts him a risk for hypoglycemia if his NPH dose is too high and he misses his meals. Pt instructed to ensure that he is taking his NPH every 12 hours (8AM and 8PM). Pt keeps his N and R insulin in his cooler with an ice pack. I recommended he pack his cooler so that the insulin is on top and so he has to see and touch it every time he opens the cooler to get something to drink. Pt voices understanding and agreement with the plan. With his hx of neuropathy and calluses, he would benefit from DM shoes and inserts. We discussed U-500, but he was concerned about cost.     2) Will check TFTs today and adjust his dose as needed. 3) Will order carotid dopplers to look for evidence of vascular disease to explain the presyncope. Pt voices understanding and agreement with the plan. Pain noted and pt was recommended to call his PCP for further evaluation and treatment, as needed    RTC 3 months    Patient Instructions   1) Novolin N: 80 units every 12 hours (8AM and 8PM). 2) It is important to work on taking your [address] Follow-up and Dispositions    · Return in about 3 months (around 9/17/2019).          Copy sent to:  Dr. Vladimir Espinoza

## 2019-06-17 NOTE — PROGRESS NOTES
Chief Complaint   Patient presents with    Anxiety    Medication Evaluation     Health Maintenance reviewed     1. Have you been to the ER, urgent care clinic since your last visit? Hospitalized since your last visit? no    2. Have you seen or consulted any other health care providers outside of the [address] [place] [address] since your last visit? Include any pap smears or colon screening.  No

## 2019-06-17 NOTE — PATIENT INSTRUCTIONS
1) Novolin N: 80 units every 12 hours (8AM and 8PM). 2) It is important to work on taking your [address]

## 2019-06-17 NOTE — PROGRESS NOTES
Chief Complaint   Patient presents with    Anxiety    Medication Evaluation     Pt reports that since increasing Zoloft from 48 to 100 he has been tired, lightheaded, still very anxious, heart racing. Pt reports that he cut back to 75 mg, took the decreased dose for 2 days, did not take any medication yesterday. Pt reports that he feels a little better today, but still does feel like himself or like he can function normally. Pt also reports a headache for over a week. Pt reports that when he sleeps he is sleeping well. He only feels comfortable when sleeping. Pt tried one pill of the vistaril, \"knocked me out for over a day\", then tried half a dose, still made pt tired and did not help with anxiety. Pt is still taking buspar, but only once daily, did increase to one and half daily and reports that it increased heart racing, \"it did not feel right\"    Pt is seeing endocrine later today. He has not been in some time. Subjective: (As above and below)     Chief Complaint   Patient presents with    Anxiety    Medication Evaluation     he is a 35y.o. year old male who presents for evaluation. Reviewed PmHx, RxHx, FmHx, SocHx, AllgHx and updated in chart.     Review of Systems - negative except as listed above    Objective:     Vitals:    06/17/19 1028   BP: 133/84   Pulse: 85   Resp: 18   Temp: 98.3 °F (36.8 °C)   TempSrc: Oral   SpO2: 97%   Weight: 249 lb (112.9 kg)   Height: 5' 9\" (1.753 m)     Physical Examination: General appearance - alert, well appearing, and in no distress  Mental status - anxiety  Mouth - mucous membranes moist, pharynx normal without lesions  Chest - clear to auscultation, no wheezes, rales or rhonchi, symmetric air entry  Heart - normal rate, regular rhythm, normal S1, S2, no murmurs, rubs, clicks or gallops  Musculoskeletal - no joint tenderness, deformity or swelling  Extremities - peripheral pulses normal, no pedal edema, no clubbing or cyanosis    Assessment/ Plan:   1. Type 2 diabetes mellitus with complication, with long-term current use of insulin ([address]  -follow up with endocrine as scheduled    2. Anxiety  -stop vistaril, start propranolol as needed  -reduce zoloft to 100mg  - pt was out of work for the last week, does not feel ready to go back at this point, approved two additional days of work out. - propranolol (INDERAL) 20 mg tablet; Take 1 Tab by mouth two (2) times a day. Dispense: 60 Tab; Refill: 2    3. Essential hypertension  -well controlled     Follow up as needed    I have discussed the diagnosis with the patient and the intended plan as seen in the above orders. The patient has received an after-visit summary and questions were answered concerning future plans. Medication Side Effects and Warnings were discussed with patient: yes  Patient Labs were reviewed: yes  Patient Past Records were reviewed:  yes    Michael Tovar M.D. Discussed the patient's BMI with him. The BMI follow up plan is as follows:     dietary management education, guidance, and counseling  encourage exercise  monitor weight  prescribed dietary intake    An After Visit Summary was printed and given to the patient.

## 2019-06-17 NOTE — PATIENT INSTRUCTIONS
Body Mass Index: Care Instructions  Your Care Instructions    Body mass index (BMI) can help you see if your weight is raising your risk for health problems. It uses a formula to compare how much you weigh with how tall you are. · A BMI lower than 18.5 is considered underweight. · A BMI between 18.5 and 24.9 is considered healthy. · A BMI between 25 and 29.9 is considered overweight. A BMI of 30 or higher is considered obese. If your BMI is in the normal range, it means that you have a lower risk for weight-related health problems. If your BMI is in the overweight or obese range, you may be at increased risk for weight-related health problems, such as high blood pressure, heart disease, stroke, arthritis or joint pain, and diabetes. If your BMI is in the underweight range, you may be at increased risk for health problems such as fatigue, lower protection (immunity) against illness, muscle loss, bone loss, hair loss, and hormone problems. BMI is just one measure of your risk for weight-related health problems. You may be at higher risk for health problems if you are not active, you eat an unhealthy diet, or you drink too much alcohol or use tobacco products. Follow-up care is a key part of your treatment and safety. Be sure to make and go to all appointments, and call your doctor if you are having problems. It's also a good idea to know your test results and keep a list of the medicines you take. How can you care for yourself at home? · Practice healthy eating habits. This includes eating plenty of fruits, vegetables, whole grains, lean protein, and low-fat dairy. · If your doctor recommends it, get more exercise. Walking is a good choice. Bit by bit, increase the amount you walk every day. Try for at least 30 minutes on most days of the week. · Do not smoke. Smoking can increase your risk for health problems. If you need help quitting, talk to your doctor about stop-smoking programs and medicines. These can increase your chances of quitting for good. · Limit alcohol to 2 drinks a day for men and 1 drink a day for women. Too much alcohol can cause health problems. If you have a BMI higher than 25  · Your doctor may do other tests to check your risk for weight-related health problems. This may include measuring the distance around your waist. A waist measurement of more than 40 inches in men or 35 inches in women can increase the risk of weight-related health problems. · Talk with your doctor about steps you can take to stay healthy or improve your health. You may need to make lifestyle changes to lose weight and stay healthy, such as changing your diet and getting regular exercise. If you have a BMI lower than 18.5  · Your doctor may do other tests to check your risk for health problems. · Talk with your doctor about steps you can take to stay healthy or improve your health. You may need to make lifestyle changes to gain or maintain weight and stay healthy, such as getting more healthy foods in your diet and doing exercises to build muscle. Where can you learn more? Go to http://ladarius-pavel.info/. Enter S176 in the search box to learn more about \"Body Mass Index: Care Instructions. \"  Current as of: October 13, 2016  Content Version: 11.4  © 3627-8402 [employer]. Care instructions adapted under license by [employer] (which disclaims liability or warranty for this information). If you have questions about a medical condition or this instruction, always ask your healthcare professional. [address] any warranty or liability for your use of this information.

## 2019-06-17 NOTE — LETTER
NOTIFICATION RETURN TO WORK  
 
6/17/2019 11:36 AM 
 
Mr. Brenda Soares [id number] To Whom It May Concern: 
 
Brenda Soares is currently under the care of [address] [place] [address]. Please excuse from work 6/17/19-6/18/19 due to medical illness. If there are questions or concerns please have the patient contact our office. Sincerely, Conor Adams MD

## 2019-06-18 LAB
EST. AVERAGE GLUCOSE BLD GHB EST-MCNC: 269 MG/DL
HBA1C MFR BLD: 11 % (ref 4.8–5.6)
T4 FREE SERPL-MCNC: 1.31 NG/DL (ref 0.82–1.77)
TSH SERPL DL<=0.005 MIU/L-ACNC: 1.71 UIU/ML (ref 0.45–4.5)

## 2019-06-18 NOTE — PROGRESS NOTES
Spoke with pt regarding his labs. Pt's TFTs look good.  Pt's A1C was 11.0%, he was instructed to work on getting his Novolin R with each meal.

## 2019-06-20 DIAGNOSIS — Z79.4 TYPE 2 DIABETES MELLITUS WITH COMPLICATION, WITH LONG-TERM CURRENT USE OF INSULIN (HCC): Chronic | ICD-10-CM

## 2019-06-20 DIAGNOSIS — E11.8 TYPE 2 DIABETES MELLITUS WITH COMPLICATION, WITH LONG-TERM CURRENT USE OF INSULIN (HCC): Chronic | ICD-10-CM

## 2019-06-21 RX ORDER — METFORMIN HYDROCHLORIDE 500 MG/1
TABLET ORAL
Qty: 180 TAB | Refills: 3 | Status: SHIPPED | OUTPATIENT
Start: 2019-06-21 | End: 2019-12-03

## 2019-07-08 ENCOUNTER — APPOINTMENT (OUTPATIENT)
Dept: CT IMAGING | Age: 39
End: 2019-07-08
Attending: EMERGENCY MEDICINE
Payer: COMMERCIAL

## 2019-07-08 ENCOUNTER — HOSPITAL ENCOUNTER (EMERGENCY)
Age: 39
Discharge: HOME | End: 2019-07-08
Attending: EMERGENCY MEDICINE
Payer: COMMERCIAL

## 2019-07-08 VITALS
BODY MASS INDEX: 36.69 KG/M2 | SYSTOLIC BLOOD PRESSURE: 126 MMHG | RESPIRATION RATE: 18 BRPM | HEART RATE: 78 BPM | DIASTOLIC BLOOD PRESSURE: 80 MMHG | WEIGHT: 248.46 LBS | TEMPERATURE: 98.9 F | OXYGEN SATURATION: 97 %

## 2019-07-08 DIAGNOSIS — R42 VERTIGO: Primary | ICD-10-CM

## 2019-07-08 DIAGNOSIS — R73.9 HYPERGLYCEMIA: ICD-10-CM

## 2019-07-08 DIAGNOSIS — G44.209 TENSION HEADACHE: ICD-10-CM

## 2019-07-08 LAB
ALBUMIN SERPL-MCNC: 3.8 G/DL (ref 3.5–5)
ALBUMIN/GLOB SERPL: 1 {RATIO} (ref 1.1–2.2)
ALP SERPL-CCNC: 125 U/L (ref 45–117)
ALT SERPL-CCNC: 52 U/L (ref 12–78)
ANION GAP SERPL CALC-SCNC: 7 MMOL/L (ref 5–15)
APPEARANCE UR: CLEAR
AST SERPL-CCNC: 14 U/L (ref 15–37)
BACTERIA URNS QL MICRO: NEGATIVE /HPF
BASOPHILS # BLD: 0.1 K/UL (ref 0–0.1)
BASOPHILS NFR BLD: 1 % (ref 0–1)
BILIRUB SERPL-MCNC: 0.4 MG/DL (ref 0.2–1)
BILIRUB UR QL: NEGATIVE
BUN SERPL-MCNC: 25 MG/DL (ref 6–20)
BUN/CREAT SERPL: 20 (ref 12–20)
CALCIUM SERPL-MCNC: 9 MG/DL (ref 8.5–10.1)
CHLORIDE SERPL-SCNC: 97 MMOL/L (ref 97–108)
CK SERPL-CCNC: 87 U/L (ref 39–308)
CO2 SERPL-SCNC: 26 MMOL/L (ref 21–32)
COLOR UR: ABNORMAL
CREAT SERPL-MCNC: 1.26 MG/DL (ref 0.7–1.3)
DIFFERENTIAL METHOD BLD: ABNORMAL
EOSINOPHIL # BLD: 0.2 K/UL (ref 0–0.4)
EOSINOPHIL NFR BLD: 2 % (ref 0–7)
EPITH CASTS URNS QL MICRO: ABNORMAL /LPF
ERYTHROCYTE [DISTWIDTH] IN BLOOD BY AUTOMATED COUNT: 13.1 % (ref 11.5–14.5)
GLOBULIN SER CALC-MCNC: 4 G/DL (ref 2–4)
GLUCOSE SERPL-MCNC: 357 MG/DL (ref 65–100)
GLUCOSE UR STRIP.AUTO-MCNC: >1000 MG/DL
HCT VFR BLD AUTO: 41.4 % (ref 36.6–50.3)
HGB BLD-MCNC: 14.2 G/DL (ref 12.1–17)
HGB UR QL STRIP: NEGATIVE
HYALINE CASTS URNS QL MICRO: ABNORMAL /LPF (ref 0–5)
IMM GRANULOCYTES # BLD AUTO: 0.1 K/UL (ref 0–0.04)
IMM GRANULOCYTES NFR BLD AUTO: 1 % (ref 0–0.5)
KETONES UR QL STRIP.AUTO: NEGATIVE MG/DL
LEUKOCYTE ESTERASE UR QL STRIP.AUTO: NEGATIVE
LYMPHOCYTES # BLD: 3 K/UL (ref 0.8–3.5)
LYMPHOCYTES NFR BLD: 31 % (ref 12–49)
MCH RBC QN AUTO: 28.8 PG (ref 26–34)
MCHC RBC AUTO-ENTMCNC: 34.3 G/DL (ref 30–36.5)
MCV RBC AUTO: 84 FL (ref 80–99)
MONOCYTES # BLD: 0.7 K/UL (ref 0–1)
MONOCYTES NFR BLD: 7 % (ref 5–13)
NEUTS SEG # BLD: 5.7 K/UL (ref 1.8–8)
NEUTS SEG NFR BLD: 58 % (ref 32–75)
NITRITE UR QL STRIP.AUTO: NEGATIVE
NRBC # BLD: 0 K/UL (ref 0–0.01)
NRBC BLD-RTO: 0 PER 100 WBC
PH UR STRIP: 5.5 [PH] (ref 5–8)
PLATELET # BLD AUTO: 245 K/UL (ref 150–400)
PMV BLD AUTO: 10.8 FL (ref 8.9–12.9)
POTASSIUM SERPL-SCNC: 4.3 MMOL/L (ref 3.5–5.1)
PROT SERPL-MCNC: 7.8 G/DL (ref 6.4–8.2)
PROT UR STRIP-MCNC: NEGATIVE MG/DL
RBC # BLD AUTO: 4.93 M/UL (ref 4.1–5.7)
RBC #/AREA URNS HPF: ABNORMAL /HPF (ref 0–5)
SODIUM SERPL-SCNC: 130 MMOL/L (ref 136–145)
SP GR UR REFRACTOMETRY: 1.03 (ref 1–1.03)
TROPONIN I SERPL-MCNC: <0.05 NG/ML
UA: UC IF INDICATED,UAUC: ABNORMAL
UROBILINOGEN UR QL STRIP.AUTO: 1 EU/DL (ref 0.2–1)
WBC # BLD AUTO: 9.7 K/UL (ref 4.1–11.1)
WBC URNS QL MICRO: ABNORMAL /HPF (ref 0–4)

## 2019-07-08 PROCEDURE — 99284 EMERGENCY DEPT VISIT MOD MDM: CPT

## 2019-07-08 PROCEDURE — 74011250637 HC RX REV CODE- 250/637: Performed by: EMERGENCY MEDICINE

## 2019-07-08 PROCEDURE — 96360 HYDRATION IV INFUSION INIT: CPT

## 2019-07-08 PROCEDURE — 36415 COLL VENOUS BLD VENIPUNCTURE: CPT

## 2019-07-08 PROCEDURE — 84484 ASSAY OF TROPONIN QUANT: CPT

## 2019-07-08 PROCEDURE — 81001 URINALYSIS AUTO W/SCOPE: CPT

## 2019-07-08 PROCEDURE — 80053 COMPREHEN METABOLIC PANEL: CPT

## 2019-07-08 PROCEDURE — 85025 COMPLETE CBC W/AUTO DIFF WBC: CPT

## 2019-07-08 PROCEDURE — 70450 CT HEAD/BRAIN W/O DYE: CPT

## 2019-07-08 PROCEDURE — 82550 ASSAY OF CK (CPK): CPT

## 2019-07-08 PROCEDURE — 74011250636 HC RX REV CODE- 250/636: Performed by: EMERGENCY MEDICINE

## 2019-07-08 RX ORDER — BUTALBITAL, ACETAMINOPHEN AND CAFFEINE 50; 325; 40 MG/1; MG/1; MG/1
1 TABLET ORAL
Status: COMPLETED | OUTPATIENT
Start: 2019-07-08 | End: 2019-07-08

## 2019-07-08 RX ORDER — HYDROXYZINE 25 MG/1
25 TABLET, FILM COATED ORAL
Status: COMPLETED | OUTPATIENT
Start: 2019-07-08 | End: 2019-07-08

## 2019-07-08 RX ORDER — MECLIZINE HYDROCHLORIDE 25 MG/1
25 TABLET ORAL
Qty: 20 TAB | Refills: 0 | Status: SHIPPED | OUTPATIENT
Start: 2019-07-08 | End: 2019-08-15

## 2019-07-08 RX ORDER — MECLIZINE HCL 12.5 MG 12.5 MG/1
25 TABLET ORAL
Status: COMPLETED | OUTPATIENT
Start: 2019-07-08 | End: 2019-07-08

## 2019-07-08 RX ORDER — HYDROXYZINE 25 MG/1
25 TABLET, FILM COATED ORAL
Qty: 20 TAB | Refills: 0 | Status: SHIPPED | OUTPATIENT
Start: 2019-07-08 | End: 2019-07-18

## 2019-07-08 RX ADMIN — SODIUM CHLORIDE 1000 ML: 900 INJECTION, SOLUTION INTRAVENOUS at 14:45

## 2019-07-08 RX ADMIN — MECLIZINE 25 MG: 12.5 TABLET ORAL at 14:45

## 2019-07-08 RX ADMIN — BUTALBITAL, ACETAMINOPHEN AND CAFFEINE 1 TABLET: 50; 325; 40 TABLET ORAL at 16:13

## 2019-07-08 RX ADMIN — HYDROXYZINE HYDROCHLORIDE 25 MG: 25 TABLET, FILM COATED ORAL at 16:13

## 2019-07-08 NOTE — ED TRIAGE NOTES
The patient arrives to triage, ambulatory with a steady gait. The patient reports dizziness, ears ringing that started over the weekend. Reports it started a month ago with food poisoning, then panic attacks. Reports saw PCP for panic attacks, reports taking for three weeks.

## 2019-07-08 NOTE — ED PROVIDER NOTES
EMERGENCY DEPARTMENT HISTORY AND PHYSICAL EXAM      Date: 7/8/2019  Patient Name: Suzie King    History of Presenting Illness     Chief Complaint   Patient presents with   Vanita Gonzalez in Ear    Dizziness       History Provided By: Patient    HPI: Suzie King, 35 y.o. male with PMHx significant for type 1 diabetes, hypertension, migraines and depression, presents    to the ED with cc of dizziness and tinnitus. The patient states that he started to have lightheadedness a month ago, when he was diagnosed with food poisoning. He was treated at another emergency department. He also experienced a panic attack there. He followed up with his PCP, who increased his Zoloft to 200 mg. He states that that made him feel like a zombie. So, his doctor decreased his Zoloft back to 100 mg and added propranolol. Since then, he has felt lightheaded off and on. In the last week, he has felt a spinning sensation and tinnitus. He states that he had a pounding sensation in his left ear few days ago, but denies any ear pain. He states that he could feel his pulse. He denies fever, chills, chest pain or shortness of breath at this time. He does admit that he had chest pain 3 days ago which lasted for 1 hour. It resolved when he laid down. He did not have any radiation of pain or nausea or shortness of breath associated with it. He states it felt alf like one of his panic attacks. There are no other complaints, changes, or physical findings at this time. PCP: [facility] MD Porfirio    No current facility-administered medications on file prior to encounter. Current Outpatient Medications on File Prior to Encounter   Medication Sig Dispense Refill    metFORMIN (GLUCOPHAGE) 500 mg tablet TAKE 1 TABLET BY MOUTH TWICE DAILY WITH MEALS 180 Tab 3    insulin NPH (NOVOLIN N NPH U-100 INSULIN) 100 unit/mL injection by SubCUTAneous route once.  800-100 units twice a day      sertraline (ZOLOFT) 100 mg tablet Take 2 Tabs by mouth daily. (Patient taking differently: Take 100 mg by mouth daily.) 60 Tab 11    busPIRone (BUSPAR) 15 mg tablet TAKE 1 TABLET BY MOUTH TWICE DAILY 60 Tab 3    lisinopril (PRINIVIL, ZESTRIL) 10 mg tablet TAKE 1 TABLET BY MOUTH ONCE DAILY 90 Tab 3    levothyroxine (SYNTHROID) 112 mcg tablet TAKE 1 TABLET BY MOUTH ONCE DAILY BEFORE BREAKFAST 90 Tab 3    butalbital-acetaminophen-caffeine (FIORICET, ESGIC) -40 mg per tablet TAKE 1 TABLET BY MOUTH AT THE ONSET OF HEADACHE. CAN TAKE TWICE DAILY BUT NOT MORE THAN 10 DAYS/MONTH.ONLY TO BE FILLED IN 1 MONTH INTERVALS 20 Tab 2    fenofibrate (LOFIBRA) 160 mg tablet TAKE 1 TABLET BY MOUTH ONCE DAILY 90 Tab 3    rosuvastatin (CRESTOR) 20 mg tablet Take 1 Tab by mouth nightly.  90 Tab 3    insulin regular (NOVOLIN R, HUMULIN R) 100 unit/mL injection 15-20 units TID AC (Patient taking differently: 15-20 units 2-3 times per day  Indications: 0-20u BID, per pt) 1 Vial 12       Past History     Past Medical History:  Past Medical History:   Diagnosis Date    Chronic headaches 2007    Depression     Diabetes ([address]     GERD (gastroesophageal reflux disease)     Hypertension     Thyroid disease     hypothyroid    Unspecified sleep apnea     does not use CPAP       Past Surgical History:  Past Surgical History:   Procedure Laterality Date    HX CHOLECYSTECTOMY  8/26/14    Dr Nemesio Gallo    HX HEENT      wisdom teeth removed    HX ORTHOPAEDIC Left 2012    carpel tunnel       Family History:  Family History   Problem Relation Age of Onset    Diabetes Mother     Heart Disease Father     Cancer Father     Diabetes Father     Diabetes Paternal Aunt     Diabetes Maternal Grandmother     Thyroid Cancer Maternal Grandmother     Kidney Disease Maternal Grandmother     Arthritis Maternal Grandmother     Heart Disease Maternal Grandfather     High Cholesterol Maternal Grandfather     Hypertension Maternal Grandfather     Diabetes Paternal Grandmother     Hemophilia Paternal Grandfather        Social History:  Social History     Tobacco Use    Smoking status: Former Smoker     Packs/day: 0.00     Years: 14.00     Pack years: 0.00     Last attempt to quit: 2014     Years since quittin.5    Smokeless tobacco: Never Used   Substance Use Topics    Alcohol use: No    Drug use: No       Allergies: Allergies   Allergen Reactions    Morphine Nausea and Vomiting    Penicillin G Swelling    Percocet [Oxycodone-Acetaminophen] Hives and Nausea and Vomiting    Sulfa (Sulfonamide Antibiotics) Swelling    Tramadol Nausea Only     Nausea and headache           Review of Systems   Review of Systems   Constitutional: Negative for fever. HENT: Positive for tinnitus. Negative for ear pain. Eyes: Negative. Respiratory: Negative for shortness of breath. Cardiovascular: Positive for chest pain. Gastrointestinal: Negative for abdominal pain. Endocrine: Negative for heat intolerance. Genitourinary: Negative for dysuria. Musculoskeletal: Negative for back pain. Skin: Negative for rash. Allergic/Immunologic: Negative for immunocompromised state. Neurological: Positive for dizziness, light-headedness and headaches. Patient has a mild headache currently   Hematological: Does not bruise/bleed easily. Psychiatric/Behavioral: Negative. All other systems reviewed and are negative. Physical Exam   Physical Exam   Constitutional: He is oriented to person, place, and time. He appears well-developed and well-nourished. No distress. HENT:   Head: Normocephalic and atraumatic. Cerumen in both ears, TMs clear    Eyes: Pupils are equal, round, and reactive to light. EOM are normal.   Neck: Normal range of motion. Neck supple. Cardiovascular: Normal rate, regular rhythm, normal heart sounds and intact distal pulses. Pulmonary/Chest: Effort normal and breath sounds normal. He has no wheezes. Abdominal: Soft.  Bowel sounds are normal. There is no tenderness. Musculoskeletal: Normal range of motion. He exhibits no edema or tenderness. Neurological: He is alert and oriented to person, place, and time. No cranial nerve deficit. Sensory and motor intact   Skin: Skin is warm and dry. No erythema. Psychiatric: He has a normal mood and affect. His behavior is normal.   Nursing note and vitals reviewed. Diagnostic Study Results     Labs -     Recent Results (from the past 12 hour(s))   CBC WITH AUTOMATED DIFF    Collection Time: 07/08/19  1:43 PM   Result Value Ref Range    WBC 9.7 4.1 - 11.1 K/uL    RBC 4.93 4.10 - 5.70 M/uL    HGB 14.2 12.1 - 17.0 g/dL    HCT 41.4 36.6 - 50.3 %    MCV 84.0 80.0 - 99.0 FL    MCH 28.8 26.0 - 34.0 PG    MCHC 34.3 30.0 - 36.5 g/dL    RDW 13.1 11.5 - 14.5 %    PLATELET 477 245 - 088 K/uL    MPV 10.8 8.9 - 12.9 FL    NRBC 0.0 0  WBC    ABSOLUTE NRBC 0.00 0.00 - 0.01 K/uL    NEUTROPHILS 58 32 - 75 %    LYMPHOCYTES 31 12 - 49 %    MONOCYTES 7 5 - 13 %    EOSINOPHILS 2 0 - 7 %    BASOPHILS 1 0 - 1 %    IMMATURE GRANULOCYTES 1 (H) 0.0 - 0.5 %    ABS. NEUTROPHILS 5.7 1.8 - 8.0 K/UL    ABS. LYMPHOCYTES 3.0 0.8 - 3.5 K/UL    ABS. MONOCYTES 0.7 0.0 - 1.0 K/UL    ABS. EOSINOPHILS 0.2 0.0 - 0.4 K/UL    ABS. BASOPHILS 0.1 0.0 - 0.1 K/UL    ABS. IMM. GRANS. 0.1 (H) 0.00 - 0.04 K/UL    DF AUTOMATED     METABOLIC PANEL, COMPREHENSIVE    Collection Time: 07/08/19  1:43 PM   Result Value Ref Range    Sodium 130 (L) 136 - 145 mmol/L    Potassium 4.3 3.5 - 5.1 mmol/L    Chloride 97 97 - 108 mmol/L    CO2 26 21 - 32 mmol/L    Anion gap 7 5 - 15 mmol/L    Glucose 357 (H) 65 - 100 mg/dL    BUN 25 (H) 6 - 20 MG/DL    Creatinine 1.26 0.70 - 1.30 MG/DL    BUN/Creatinine ratio 20 12 - 20      GFR est AA >60 >60 ml/min/1.73m2    GFR est non-AA >60 >60 ml/min/1.73m2    Calcium 9.0 8.5 - 10.1 MG/DL    Bilirubin, total 0.4 0.2 - 1.0 MG/DL    ALT (SGPT) 52 12 - 78 U/L    AST (SGOT) 14 (L) 15 - 37 U/L    Alk.  phosphatase 125 (H) 45 - 117 U/L    Protein, total 7.8 6.4 - 8.2 g/dL    Albumin 3.8 3.5 - 5.0 g/dL    Globulin 4.0 2.0 - 4.0 g/dL    A-G Ratio 1.0 (L) 1.1 - 2.2     URINALYSIS W/ REFLEX CULTURE    Collection Time: 07/08/19  1:53 PM   Result Value Ref Range    Color YELLOW/STRAW      Appearance CLEAR CLEAR      Specific gravity 1.026 1.003 - 1.030      pH (UA) 5.5 5.0 - 8.0      Protein NEGATIVE  NEG mg/dL    Glucose >1,000 (A) NEG mg/dL    Ketone NEGATIVE  NEG mg/dL    Bilirubin NEGATIVE  NEG      Blood NEGATIVE  NEG      Urobilinogen 1.0 0.2 - 1.0 EU/dL    Nitrites NEGATIVE  NEG      Leukocyte Esterase NEGATIVE  NEG      WBC 0-4 0 - 4 /hpf    RBC 0-5 0 - 5 /hpf    Epithelial cells FEW FEW /lpf    Bacteria NEGATIVE  NEG /hpf    UA:UC IF INDICATED CULTURE NOT INDICATED BY UA RESULT CNI      Hyaline cast 0-2 0 - 5 /lpf       Radiologic Studies -   CT HEAD WO CONT   Final Result   IMPRESSION: No acute intracranial process. Unchanged old lacunar infarct in the   left caudate head. CT Results  (Last 48 hours)    None        CXR Results  (Last 48 hours)    None            Medical Decision Making   I am the first provider for this patient. I reviewed the vital signs, available nursing notes, past medical history, past surgical history, family history and social history. Vital Signs-Reviewed the patient's vital signs. Patient Vitals for the past 12 hrs:   Temp Pulse Resp BP SpO2   07/08/19 1314 98.9 °F (37.2 °C) 78 18 139/89 97 %       Pulse Oximetry Analysis - 97% on room air    Cardiac Monitor:   Rate: 78 bpm  Rhythm: Normal Sinus Rhythm          Records Reviewed: Nursing Notes, Old Medical Records, Previous Radiology Studies and Previous Laboratory Studies    Provider Notes (Medical Decision Making):   Vertigo, CVA, TIA, electrolyte abnormality, dehydration, anemia,    ED Course:   Initial assessment performed.  The patients presenting problems have been discussed, and they are in agreement with the care plan formulated and outlined with them. I have encouraged them to ask questions as they arise throughout their visit. Progress note: The patient's results were reviewed. The patient is feeling better. He is advised to follow-up and return to ER if worse         Critical Care Time:   0    Disposition:  Home    PLAN:  1. Discharge Medication List as of 7/8/2019  4:30 PM      START taking these medications    Details   meclizine (ANTIVERT) 25 mg tablet Take 1 Tab by mouth three (3) times daily as needed for Dizziness., Normal, Disp-20 Tab, R-0      hydrOXYzine HCl (ATARAX) 25 mg tablet Take 1 Tab by mouth every six (6) hours as needed for Itching for up to 10 days. , Normal, Disp-20 Tab, R-0         CONTINUE these medications which have NOT CHANGED    Details   metFORMIN (GLUCOPHAGE) 500 mg tablet TAKE 1 TABLET BY MOUTH TWICE DAILY WITH MEALS, Normal, Disp-180 Tab, R-3      insulin NPH (NOVOLIN N NPH U-100 INSULIN) 100 unit/mL injection by SubCUTAneous route once. 800-100 units twice a day, Historical Med      sertraline (ZOLOFT) 100 mg tablet Take 2 Tabs by mouth daily. , NormalPlease consider 90 day supplies to promote better adherenceDisp-60 Tab, R-11      busPIRone (BUSPAR) 15 mg tablet TAKE 1 TABLET BY MOUTH TWICE DAILY, NormalPlease consider 90 day supplies to promote better adherenceDisp-60 Tab, R-3      lisinopril (PRINIVIL, ZESTRIL) 10 mg tablet TAKE 1 TABLET BY MOUTH ONCE DAILY, Normal, Disp-90 Tab, R-3      levothyroxine (SYNTHROID) 112 mcg tablet TAKE 1 TABLET BY MOUTH ONCE DAILY BEFORE BREAKFAST, Normal, Disp-90 Tab, R-3      butalbital-acetaminophen-caffeine (FIORICET, ESGIC) -40 mg per tablet TAKE 1 TABLET BY MOUTH AT THE ONSET OF HEADACHE. CAN TAKE TWICE DAILY BUT NOT MORE THAN 10 DAYS/MONTH.ONLY TO BE FILLED IN 1 MONTH INTERVALS, NormalPlease consider 90 day supplies to promote better adherenceDisp-20 Tab, R-2      fenofibrate (LOFIBRA) 160 mg tablet TAKE 1 TABLET BY MOUTH ONCE DAILY, NormalPlease consider 90 day supplies to promote better adherenceDisp-90 Tab, R-3      rosuvastatin (CRESTOR) 20 mg tablet Take 1 Tab by mouth nightly., Normal, Disp-90 Tab, R-3      insulin regular (NOVOLIN R, HUMULIN R) 100 unit/mL injection 15-20 units TID AC, No Print, Disp-1 Vial, R-12           2. Follow-up Information     Follow up With Specialties Details Why Contact Info    Yordy Heller MD [facility] In 2 days As needed [address] [phone]      Marisol Camarillo MD Otolaryngology  As needed [phone] Right [address] [place] [address]. [phone]      [facility] [place] EMER[facility]ENCY DE[facility] Emergency Medicine  If symptoms worsen [address]  [phone]        Return to ED if worse     Diagnosis     Clinical Impression:   1. Vertigo    2. Tension headache    3. Hyperglycemia        Attestations:     This chart was completed by myself, Dr. Cleary Memory

## 2019-07-08 NOTE — LETTER
[facility] EMER[facility]ENCY DEPT 
[address] [place] [address] [phone] 
[phone] Work/School Note Date: 7/8/2019 To Whom It May concern: 
 
Eliu Ford was seen and treated today in the emergency room by the following provider(s): 
Attending Provider: Clifford Gonzalez MD.   
 
Eliu Ford may return to work on 07/10/2019. Sincerely, Linda Walters MD

## 2019-07-08 NOTE — DISCHARGE INSTRUCTIONS
Patient Education        Headache: Care Instructions  Your Care Instructions    Headaches have many possible causes. Most headaches aren't a sign of a more serious problem, and they will get better on their own. Home treatment may help you feel better faster. The doctor has checked you carefully, but problems can develop later. If you notice any problems or new symptoms, get medical treatment right away. Follow-up care is a key part of your treatment and safety. Be sure to make and go to all appointments, and call your doctor if you are having problems. It's also a good idea to know your test results and keep a list of the medicines you take. How can you care for yourself at home? · Do not drive if you have taken a prescription pain medicine. · Rest in a quiet, dark room until your headache is gone. Close your eyes and try to relax or go to sleep. Don't watch TV or read. · Put a cold, moist cloth or cold pack on the painful area for 10 to 20 minutes at a time. Put a thin cloth between the cold pack and your skin. · Use a warm, moist towel or a heating pad set on low to relax tight shoulder and neck muscles. · Have someone gently massage your neck and shoulders. · Take pain medicines exactly as directed. ? If the doctor gave you a prescription medicine for pain, take it as prescribed. ? If you are not taking a prescription pain medicine, ask your doctor if you can take an over-the-counter medicine. · Be careful not to take pain medicine more often than the instructions allow, because you may get worse or more frequent headaches when the medicine wears off. · Do not ignore new symptoms that occur with a headache, such as a fever, weakness or numbness, vision changes, or confusion. These may be signs of a more serious problem. To prevent headaches  · Keep a headache diary so you can figure out what triggers your headaches. Avoiding triggers may help you prevent headaches.  Record when each headache began, how long it lasted, and what the pain was like (throbbing, aching, stabbing, or dull). Write down any other symptoms you had with the headache, such as nausea, flashing lights or dark spots, or sensitivity to bright light or loud noise. Note if the headache occurred near your period. List anything that might have triggered the headache, such as certain foods (chocolate, cheese, wine) or odors, smoke, bright light, stress, or lack of sleep. · Find healthy ways to deal with stress. Headaches are most common during or right after stressful times. Take time to relax before and after you do something that has caused a headache in the past.  · Try to keep your muscles relaxed by keeping good posture. Check your jaw, face, neck, and shoulder muscles for tension, and try relaxing them. When sitting at a desk, change positions often, and stretch for 30 seconds each hour. · Get plenty of sleep and exercise. · Eat regularly and well. Long periods without food can trigger a headache. · Treat yourself to a massage. Some people find that regular massages are very helpful in relieving tension. · Limit caffeine by not drinking too much coffee, tea, or soda. But don't quit caffeine suddenly, because that can also give you headaches. · Reduce eyestrain from computers by blinking frequently and looking away from the computer screen every so often. Make sure you have proper eyewear and that your monitor is set up properly, about an arm's length away. · Seek help if you have depression or anxiety. Your headaches may be linked to these conditions. Treatment can both prevent headaches and help with symptoms of anxiety or depression. When should you call for help? Call 911 anytime you think you may need emergency care. For example, call if:    · You have signs of a stroke. These may include:  ? Sudden numbness, paralysis, or weakness in your face, arm, or leg, especially on only one side of your body.   ? Sudden vision changes. ? Sudden trouble speaking. ? Sudden confusion or trouble understanding simple statements. ? Sudden problems with walking or balance. ? A sudden, severe headache that is different from past headaches.    Call your doctor now or seek immediate medical care if:    · You have a new or worse headache.     · Your headache gets much worse. Where can you learn more? Go to http://ladarius-pavel.info/. Enter M271 in the search box to learn more about \"Headache: Care Instructions. \"  Current as of: Chanel 3, 2018  Content Version: 11.9  © 4629-0327 [employer]. Care instructions adapted under license by [employer] (which disclaims liability or warranty for this information). If you have questions about a medical condition or this instruction, always ask your healthcare professional. [place] [address] any warranty or liability for your use of this information. Patient Education        Tension Headache: Care Instructions  Your Care Instructions  Most headaches are tension headaches. These headaches tend to happen again, especially if you are under stress. A tension headache may cause pain or a feeling of pressure all over your head. You probably can't pinpoint the center of the pain. If you keep getting tension headaches, the best thing you can do to limit them is to find out what is causing them and then make changes in those areas. Follow-up care is a key part of your treatment and safety. Be sure to make and go to all appointments, and call your doctor if you are having problems. It's also a good idea to know your test results and keep a list of the medicines you take. How can you care for yourself at home? · Rest in a quiet, dark room with a cool cloth on your forehead until your headache is gone. Close your eyes, and try to relax or go to sleep. Don't watch TV or read. Avoid using the computer.   · Use a warm, moist towel or a heating pad set on low to relax tight shoulder and neck muscles. · Have someone gently massage your neck and shoulders. · Take pain medicines exactly as directed. ? If the doctor gave you a prescription medicine for pain, take it as prescribed. ? If you are not taking a prescription pain medicine, ask your doctor if you can take an over-the-counter medicine. · Be careful not to take pain medicine more often than the instructions allow, because you may get worse or more frequent headaches when the medicine wears off. · If you get another tension headache, stop what you are doing and sit quietly for a moment. Close your eyes and breathe slowly. Try to relax your head and neck muscles. · Do not ignore new symptoms that occur with a headache, such as fever, weakness or numbness, vision changes, or confusion. These may be signs of a more serious problem. To help prevent headaches  · Keep a headache diary so you can figure out what triggers your headaches. Avoiding triggers may help you prevent headaches. Record when each headache began, how long it lasted, and what the pain was like (throbbing, aching, stabbing, or dull). List anything that may have triggered the headache, such as being physically or emotionally stressed or being anxious or depressed. Other possible triggers are hunger, anger, fatigue, poor posture, and muscle strain. · Find healthy ways to deal with stress. Headaches are most common during or right after stressful times. Take time to relax before and after you do something that has caused a headache in the past.  · Exercise daily to relieve stress. Relaxation exercises may help reduce tension. · Get plenty of sleep. · Eat regularly and well. Long periods without food can trigger a headache. · Treat yourself to a massage. Some people find that massages are very helpful in relieving tension. · Try to keep your muscles relaxed by keeping good posture.  Check your jaw, face, neck, and shoulder muscles for tension, and try to relax them. When sitting at a desk, change positions often, and stretch for 30 seconds each hour. · Reduce eyestrain from computers by blinking frequently and looking away from the computer screen every so often. Make sure you have proper eyewear and that your monitor is set up properly, about an arm's length away. When should you call for help? Call 911 anytime you think you may need emergency care. For example, call if:    · You have signs of a stroke. These may include:  ? Sudden numbness, paralysis, or weakness in your face, arm, or leg, especially on only one side of your body. ? Sudden vision changes. ? Sudden trouble speaking. ? Sudden confusion or trouble understanding simple statements. ? Sudden problems with walking or balance. ? A sudden, severe headache that is different from past headaches.    Call your doctor now or seek immediate medical care if:    · You have new or worse nausea and vomiting.     · You have a new or higher fever.     · Your headache gets much worse.    Watch closely for changes in your health, and be sure to contact your doctor if:    · You are not getting better after 2 days (48 hours). Where can you learn more? Go to http://ladarius-pavel.info/. Enter 84 17 [id number] in the search box to learn more about \"Tension Headache: Care Instructions. \"  Current as of: Chanel 3, 2018  Content Version: 11.9  © 1524-5470 [employer]. Care instructions adapted under license by [employer] (which disclaims liability or warranty for this information). If you have questions about a medical condition or this instruction, always ask your healthcare professional. [place] [address] any warranty or liability for your use of this information. Patient Education        Vertigo: Care Instructions  Your Care Instructions    Vertigo is the feeling that you or your surroundings are moving when there is no actual movement.  It is often described as a feeling of spinning, whirling, falling, or tilting. Vertigo may make you vomit or feel nauseated. You may have trouble standing or walking and may lose your balance. Vertigo is often related to an inner ear problem, but it can have other more serious causes. If vertigo continues, you may need more tests to find its cause. Follow-up care is a key part of your treatment and safety. Be sure to make and go to all appointments, and call your doctor if you are having problems. It's also a good idea to know your test results and keep a list of the medicines you take. How can you care for yourself at home? · Do not lie flat on your back. Prop yourself up slightly. This may reduce the spinning feeling. Keep your eyes open. · Move slowly so that you do not fall. · If your doctor recommends medicine, take it exactly as directed. · Do not drive while you are having vertigo. Certain exercises, called Holder-Daroff exercises, can help decrease vertigo. To do Holder-Daroff exercises:  · Sit on the edge of a bed or sofa and quickly lie down on the side that causes the worst vertigo. Lie on your side with your ear down. · Stay in this position for at least 30 seconds or until the vertigo goes away. · Sit up. If this causes vertigo, wait for it to stop. · Repeat the procedure on the other side. · Repeat this 10 times. Do these exercises 2 times a day until the vertigo is gone. When should you call for help? Call 911 anytime you think you may need emergency care. For example, call if:    · You passed out (lost consciousness).     · You have symptoms of a stroke. These may include:  ? Sudden numbness, tingling, weakness, or loss of movement in your face, arm, or leg, especially on only one side of your body. ? Sudden vision changes. ? Sudden trouble speaking. ? Sudden confusion or trouble understanding simple statements. ? Sudden problems with walking or balance.   ? A sudden, severe headache that is different from past headaches.    Call your doctor now or seek immediate medical care if:    · Vertigo occurs with a fever, a headache, or ringing in your ears.     · You have new or increased nausea and vomiting.    Watch closely for changes in your health, and be sure to contact your doctor if:    · Vertigo gets worse or happens more often.     · Vertigo has not gotten better after 2 weeks. Where can you learn more? Go to http://ladarius-pavel.info/. Enter T780 in the search box to learn more about \"Vertigo: Care Instructions. \"  Current as of: March 27, 2018  Content Version: 11.9  © 5770-0566 [employer]. Care instructions adapted under license by [employer] (which disclaims liability or warranty for this information). If you have questions about a medical condition or this instruction, always ask your healthcare professional. [address] any warranty or liability for your use of this information.

## 2019-07-15 ENCOUNTER — HOSPITAL ENCOUNTER (OUTPATIENT)
Dept: VASCULAR SURGERY | Age: 39
Discharge: HOME | End: 2019-07-15
Attending: INTERNAL MEDICINE
Payer: COMMERCIAL

## 2019-07-15 DIAGNOSIS — R42 POSTURAL DIZZINESS WITH PRESYNCOPE: ICD-10-CM

## 2019-07-15 DIAGNOSIS — R55 POSTURAL DIZZINESS WITH PRESYNCOPE: ICD-10-CM

## 2019-07-15 PROBLEM — I65.23 BILATERAL CAROTID ARTERY STENOSIS: Status: ACTIVE | Noted: 2019-07-15

## 2019-07-15 PROBLEM — G45.1 TRANSIENT ISCHEMIC ATTACK INVOLVING LEFT INTERNAL CAROTID ARTERY: Status: ACTIVE | Noted: 2019-07-15

## 2019-07-15 LAB
LEFT CCA DIST DIAS: 26.4 CM/S
LEFT CCA DIST SYS: 91.2 CM/S
LEFT CCA PROX DIAS: 29 CM/S
LEFT CCA PROX SYS: 122.3 CM/S
LEFT ECA DIAS: 16.1 CM/S
LEFT ECA SYS: 91.2 CM/S
LEFT ICA DIST DIAS: 29 CM/S
LEFT ICA DIST SYS: 75.6 CM/S
LEFT ICA MID DIAS: 23.8 CM/S
LEFT ICA MID SYS: 80.8 CM/S
LEFT ICA PROX DIAS: 26.4 CM/S
LEFT ICA PROX SYS: 91.2 CM/S
LEFT ICA/CCA SYS: 1
LEFT SUBCLAVIAN DIAS: 23.8 CM/S
LEFT SUBCLAVIAN SYS: 124.9 CM/S
LEFT VERTEBRAL DIAS: 16.2 CM/S
LEFT VERTEBRAL SYS: 49.4 CM/S
RIGHT CCA DIST DIAS: 29 CM/S
RIGHT CCA DIST SYS: 90.4 CM/S
RIGHT CCA PROX DIAS: 20.2 CM/S
RIGHT CCA PROX SYS: 108 CM/S
RIGHT ECA DIAS: 18 CM/S
RIGHT ECA SYS: 88.2 CM/S
RIGHT ICA DIST DIAS: 24.6 CM/S
RIGHT ICA DIST SYS: 68.5 CM/S
RIGHT ICA MID DIAS: 26.8 CM/S
RIGHT ICA MID SYS: 75.1 CM/S
RIGHT ICA PROX DIAS: 26.8 CM/S
RIGHT ICA PROX SYS: 72.9 CM/S
RIGHT ICA/CCA SYS: 0.8
RIGHT SUBCLAVIAN DIAS: 0 CM/S
RIGHT SUBCLAVIAN SYS: 98.3 CM/S
RIGHT VERTEBRAL DIAS: 0 CM/S
RIGHT VERTEBRAL SYS: 24.6 CM/S

## 2019-07-15 PROCEDURE — 93880 EXTRACRANIAL BILAT STUDY: CPT

## 2019-07-16 DIAGNOSIS — R42 POSTURAL DIZZINESS WITH PRESYNCOPE: Primary | ICD-10-CM

## 2019-07-16 DIAGNOSIS — R55 POSTURAL DIZZINESS WITH PRESYNCOPE: Primary | ICD-10-CM

## 2019-07-30 ENCOUNTER — TELEPHONE (OUTPATIENT)
Dept: ENDOCRINOLOGY | Age: 39
End: 2019-07-30

## 2019-07-30 NOTE — TELEPHONE ENCOUNTER
I called regarding the [id number] and got approval.    Approval #: [id number]  Expiration Date: 9/13/2019.

## 2019-07-30 NOTE — TELEPHONE ENCOUNTER
----- Message from Patrice Barnes sent at 7/30/2019  2:52 PM EDT -----  Regarding: /Telephone  General Message/Vendor Calls    Caller's first and last name: Benita Solis      Reason for call:  Insurance peer to peer      Callback required yes/no and why: yes      Best contact number(s): [phone]      Details to clarify the request: Benita Solis called from [facility] in regards to a peer to peer for [facility] [phone] option 3 include case number [id number] to complete the request       Patrice Barnes

## 2019-08-03 ENCOUNTER — HOSPITAL ENCOUNTER (OUTPATIENT)
Dept: MRI IMAGING | Age: 39
Discharge: HOME | End: 2019-08-03
Attending: INTERNAL MEDICINE
Payer: SUBSIDIZED

## 2019-08-03 DIAGNOSIS — R42 POSTURAL DIZZINESS WITH PRESYNCOPE: ICD-10-CM

## 2019-08-03 DIAGNOSIS — R55 POSTURAL DIZZINESS WITH PRESYNCOPE: ICD-10-CM

## 2019-08-03 PROCEDURE — 74011250636 HC RX REV CODE- 250/636: Performed by: INTERNAL MEDICINE

## 2019-08-03 PROCEDURE — 70553 MRI BRAIN STEM W/O & W/DYE: CPT

## 2019-08-03 PROCEDURE — A9575 INJ GADOTERATE MEGLUMI 0.1ML: HCPCS | Performed by: INTERNAL MEDICINE

## 2019-08-03 RX ORDER — GADOTERATE MEGLUMINE 376.9 MG/ML
20 INJECTION INTRAVENOUS
Status: COMPLETED | OUTPATIENT
Start: 2019-08-03 | End: 2019-08-03

## 2019-08-03 RX ADMIN — GADOTERATE MEGLUMINE 20 ML: 376.9 INJECTION INTRAVENOUS at 10:23

## 2019-08-14 ENCOUNTER — HOSPITAL ENCOUNTER (EMERGENCY)
Age: 39
Discharge: HOME | End: 2019-08-15
Attending: EMERGENCY MEDICINE
Payer: SUBSIDIZED

## 2019-08-14 DIAGNOSIS — J20.9 ACUTE BRONCHITIS, UNSPECIFIED ORGANISM: Primary | ICD-10-CM

## 2019-08-14 DIAGNOSIS — J01.90 ACUTE SINUSITIS, RECURRENCE NOT SPECIFIED, UNSPECIFIED LOCATION: ICD-10-CM

## 2019-08-14 PROCEDURE — 99283 EMERGENCY DEPT VISIT LOW MDM: CPT

## 2019-08-14 PROCEDURE — 94640 AIRWAY INHALATION TREATMENT: CPT

## 2019-08-14 PROCEDURE — 96361 HYDRATE IV INFUSION ADD-ON: CPT

## 2019-08-14 PROCEDURE — 96374 THER/PROPH/DIAG INJ IV PUSH: CPT

## 2019-08-15 ENCOUNTER — APPOINTMENT (OUTPATIENT)
Dept: GENERAL RADIOLOGY | Age: 39
End: 2019-08-15
Attending: EMERGENCY MEDICINE
Payer: SUBSIDIZED

## 2019-08-15 VITALS
RESPIRATION RATE: 16 BRPM | BODY MASS INDEX: 37 KG/M2 | WEIGHT: 249.78 LBS | TEMPERATURE: 99 F | OXYGEN SATURATION: 100 % | DIASTOLIC BLOOD PRESSURE: 92 MMHG | HEIGHT: 69 IN | SYSTOLIC BLOOD PRESSURE: 125 MMHG | HEART RATE: 87 BPM

## 2019-08-15 LAB
ALBUMIN SERPL-MCNC: 3.6 G/DL (ref 3.5–5)
ALBUMIN/GLOB SERPL: 0.8 {RATIO} (ref 1.1–2.2)
ALP SERPL-CCNC: 135 U/L (ref 45–117)
ALT SERPL-CCNC: 37 U/L (ref 12–78)
ANION GAP SERPL CALC-SCNC: 6 MMOL/L (ref 5–15)
AST SERPL-CCNC: 15 U/L (ref 15–37)
BASOPHILS # BLD: 0.1 K/UL (ref 0–0.1)
BASOPHILS NFR BLD: 1 % (ref 0–1)
BILIRUB SERPL-MCNC: 0.4 MG/DL (ref 0.2–1)
BUN SERPL-MCNC: 16 MG/DL (ref 6–20)
BUN/CREAT SERPL: 13 (ref 12–20)
CALCIUM SERPL-MCNC: 9.3 MG/DL (ref 8.5–10.1)
CHLORIDE SERPL-SCNC: 99 MMOL/L (ref 97–108)
CO2 SERPL-SCNC: 29 MMOL/L (ref 21–32)
CREAT SERPL-MCNC: 1.27 MG/DL (ref 0.7–1.3)
DIFFERENTIAL METHOD BLD: ABNORMAL
EOSINOPHIL # BLD: 0.3 K/UL (ref 0–0.4)
EOSINOPHIL NFR BLD: 3 % (ref 0–7)
ERYTHROCYTE [DISTWIDTH] IN BLOOD BY AUTOMATED COUNT: 13 % (ref 11.5–14.5)
GLOBULIN SER CALC-MCNC: 4.6 G/DL (ref 2–4)
GLUCOSE SERPL-MCNC: 143 MG/DL (ref 65–100)
HCT VFR BLD AUTO: 43.3 % (ref 36.6–50.3)
HGB BLD-MCNC: 14.7 G/DL (ref 12.1–17)
IMM GRANULOCYTES # BLD AUTO: 0.1 K/UL (ref 0–0.04)
IMM GRANULOCYTES NFR BLD AUTO: 1 % (ref 0–0.5)
LYMPHOCYTES # BLD: 3 K/UL (ref 0.8–3.5)
LYMPHOCYTES NFR BLD: 27 % (ref 12–49)
MCH RBC QN AUTO: 28.9 PG (ref 26–34)
MCHC RBC AUTO-ENTMCNC: 33.9 G/DL (ref 30–36.5)
MCV RBC AUTO: 85.1 FL (ref 80–99)
MONOCYTES # BLD: 1 K/UL (ref 0–1)
MONOCYTES NFR BLD: 9 % (ref 5–13)
NEUTS SEG # BLD: 6.7 K/UL (ref 1.8–8)
NEUTS SEG NFR BLD: 59 % (ref 32–75)
NRBC # BLD: 0 K/UL (ref 0–0.01)
NRBC BLD-RTO: 0 PER 100 WBC
PLATELET # BLD AUTO: 251 K/UL (ref 150–400)
PMV BLD AUTO: 10.5 FL (ref 8.9–12.9)
POTASSIUM SERPL-SCNC: 3.9 MMOL/L (ref 3.5–5.1)
PROT SERPL-MCNC: 8.2 G/DL (ref 6.4–8.2)
RBC # BLD AUTO: 5.09 M/UL (ref 4.1–5.7)
SODIUM SERPL-SCNC: 134 MMOL/L (ref 136–145)
WBC # BLD AUTO: 11.3 K/UL (ref 4.1–11.1)

## 2019-08-15 PROCEDURE — 71046 X-RAY EXAM CHEST 2 VIEWS: CPT

## 2019-08-15 PROCEDURE — 74011250636 HC RX REV CODE- 250/636

## 2019-08-15 PROCEDURE — 74011250636 HC RX REV CODE- 250/636: Performed by: EMERGENCY MEDICINE

## 2019-08-15 PROCEDURE — 85025 COMPLETE CBC W/AUTO DIFF WBC: CPT

## 2019-08-15 PROCEDURE — 74011000250 HC RX REV CODE- 250: Performed by: EMERGENCY MEDICINE

## 2019-08-15 PROCEDURE — 80053 COMPREHEN METABOLIC PANEL: CPT

## 2019-08-15 PROCEDURE — 74011000250 HC RX REV CODE- 250

## 2019-08-15 PROCEDURE — 74011250637 HC RX REV CODE- 250/637

## 2019-08-15 PROCEDURE — 36415 COLL VENOUS BLD VENIPUNCTURE: CPT

## 2019-08-15 PROCEDURE — 74011250637 HC RX REV CODE- 250/637: Performed by: EMERGENCY MEDICINE

## 2019-08-15 RX ORDER — HYDROCODONE POLISTIREX AND CHLORPHENIRAMINE POLISTIREX 10; 8 MG/5ML; MG/5ML
5 SUSPENSION, EXTENDED RELEASE ORAL
Status: COMPLETED | OUTPATIENT
Start: 2019-08-15 | End: 2019-08-15

## 2019-08-15 RX ORDER — KETOROLAC TROMETHAMINE 30 MG/ML
30 INJECTION, SOLUTION INTRAMUSCULAR; INTRAVENOUS
Status: COMPLETED | OUTPATIENT
Start: 2019-08-15 | End: 2019-08-15

## 2019-08-15 RX ORDER — ALBUTEROL SULFATE 90 UG/1
1 AEROSOL, METERED RESPIRATORY (INHALATION)
Status: COMPLETED | OUTPATIENT
Start: 2019-08-15 | End: 2019-08-15

## 2019-08-15 RX ORDER — ALBUTEROL SULFATE 2.5 MG/.5ML
5 SOLUTION RESPIRATORY (INHALATION)
Status: COMPLETED | OUTPATIENT
Start: 2019-08-15 | End: 2019-08-15

## 2019-08-15 RX ORDER — KETOROLAC TROMETHAMINE 30 MG/ML
INJECTION, SOLUTION INTRAMUSCULAR; INTRAVENOUS
Status: COMPLETED
Start: 2019-08-15 | End: 2019-08-15

## 2019-08-15 RX ORDER — ALBUTEROL SULFATE 0.83 MG/ML
SOLUTION RESPIRATORY (INHALATION)
Status: COMPLETED
Start: 2019-08-15 | End: 2019-08-15

## 2019-08-15 RX ORDER — HYDROCODONE POLISTIREX AND CHLORPHENIRAMINE POLISTIREX 10; 8 MG/5ML; MG/5ML
SUSPENSION, EXTENDED RELEASE ORAL
Status: COMPLETED
Start: 2019-08-15 | End: 2019-08-15

## 2019-08-15 RX ORDER — HYDROCODONE BITARTRATE AND HOMATROPINE METHYLBROMIDE ORAL SOLUTION 5; 1.5 MG/5ML; MG/5ML
5 LIQUID ORAL
Qty: 120 ML | Refills: 0 | Status: SHIPPED | OUTPATIENT
Start: 2019-08-15 | End: 2019-08-21

## 2019-08-15 RX ORDER — AZITHROMYCIN 250 MG/1
250 TABLET, FILM COATED ORAL DAILY
Qty: 4 TAB | Refills: 0 | Status: SHIPPED | OUTPATIENT
Start: 2019-08-15 | End: 2019-08-19

## 2019-08-15 RX ORDER — PROPRANOLOL HYDROCHLORIDE 20 MG/1
TABLET ORAL 3 TIMES DAILY
COMMUNITY
End: 2019-08-26 | Stop reason: ALTCHOICE

## 2019-08-15 RX ORDER — SODIUM CHLORIDE 0.9 % (FLUSH) 0.9 %
5-40 SYRINGE (ML) INJECTION AS NEEDED
Status: DISCONTINUED | OUTPATIENT
Start: 2019-08-15 | End: 2019-08-15 | Stop reason: HOSPADM

## 2019-08-15 RX ORDER — AZITHROMYCIN 250 MG/1
500 TABLET, FILM COATED ORAL
Status: COMPLETED | OUTPATIENT
Start: 2019-08-15 | End: 2019-08-15

## 2019-08-15 RX ORDER — SODIUM CHLORIDE 0.9 % (FLUSH) 0.9 %
5-40 SYRINGE (ML) INJECTION EVERY 8 HOURS
Status: DISCONTINUED | OUTPATIENT
Start: 2019-08-15 | End: 2019-08-15 | Stop reason: HOSPADM

## 2019-08-15 RX ADMIN — AZITHROMYCIN MONOHYDRATE 500 MG: 250 TABLET ORAL at 03:37

## 2019-08-15 RX ADMIN — HYDROCODONE POLISTIREX AND CHLORPHENIRAMINE POLISTIREX 5 ML: 10; 8 SUSPENSION, EXTENDED RELEASE ORAL at 01:30

## 2019-08-15 RX ADMIN — ALBUTEROL SULFATE 2.5 MG: 2.5 SOLUTION RESPIRATORY (INHALATION) at 01:37

## 2019-08-15 RX ADMIN — KETOROLAC TROMETHAMINE 30 MG: 30 INJECTION, SOLUTION INTRAMUSCULAR at 01:38

## 2019-08-15 RX ADMIN — ALBUTEROL SULFATE 5 MG: 2.5 SOLUTION RESPIRATORY (INHALATION) at 01:13

## 2019-08-15 RX ADMIN — SODIUM CHLORIDE 1000 ML: 900 INJECTION, SOLUTION INTRAVENOUS at 01:43

## 2019-08-15 RX ADMIN — ALBUTEROL SULFATE 1 PUFF: 90 AEROSOL, METERED RESPIRATORY (INHALATION) at 03:37

## 2019-08-15 RX ADMIN — KETOROLAC TROMETHAMINE 30 MG: 30 INJECTION, SOLUTION INTRAMUSCULAR; INTRAVENOUS at 01:38

## 2019-08-15 NOTE — ED NOTES
Reports feeling some better. Discussed use of albuterol inhaler post discharge. Dr. Juan Luis Hassan has reviewed discharge instructions with the patient. The patient verbalized understanding. Pt. A&Ox4, respirations even and unlabored. VS stable as noted in flowsheet. Declined wheelchair assist from department; paperwork in hand.

## 2019-08-15 NOTE — ED NOTES
Assumed care of pt from triage. Complains of weakness, congestion, cough, and occasional nausea over the last few days. A&Ox4, monitor x2.

## 2019-08-15 NOTE — ED NOTES
Medicated as per EMAR. Given medication education for the cough suppressant and the albuterol treatment. Verbalized understanding.

## 2019-08-15 NOTE — DISCHARGE INSTRUCTIONS
Patient Education        Saline Nasal Washes: Care Instructions  Your Care Instructions  Saline nasal washes help keep the nasal passages open by washing out thick or dried mucus. This simple remedy can help relieve symptoms of allergies, sinusitis, and colds. It also can make the nose feel more comfortable by keeping the mucous membranes moist. You may notice a little burning sensation in your nose the first few times you use the solution, but this usually gets better in a few days. Follow-up care is a key part of your treatment and safety. Be sure to make and go to all appointments, and call your doctor if you are having problems. It's also a good idea to know your test results and keep a list of the medicines you take. How can you care for yourself at home? · You can buy premixed saline solution in a squeeze bottle or other sinus rinse products at a drugstore. Read and follow the instructions on the label. · You also can make your own saline solution by adding 1 teaspoon of salt and 1 teaspoon of baking soda to 2 cups of distilled water. · If you use a homemade solution, pour a small amount into a clean bowl. Using a rubber bulb syringe, squeeze the syringe and place the tip in the salt water. Pull a small amount of the salt water into the syringe by relaxing your hand. · Sit down with your head tilted slightly back. Do not lie down. Put the tip of the bulb syringe or the squeeze bottle a little way into one of your nostrils. Gently drip or squirt a few drops into the nostril. Repeat with the other nostril. Some sneezing and gagging are normal at first.  · Gently blow your nose. · Wipe the syringe or bottle tip clean after each use. · Repeat this 2 or 3 times a day. · Use nasal washes gently if you have nosebleeds often. When should you call for help?   Watch closely for changes in your health, and be sure to contact your doctor if:    · You often get nosebleeds.     · You have problems doing the nasal washes. Where can you learn more? Go to http://ladarius-pavel.info/. Enter [id number] [id number] in the search box to learn more about \"Saline Nasal Washes: Care Instructions. \"  Current as of: October 21, 2018  Content Version: 12.1  © 8270-9282 [employer]. Care instructions adapted under license by [employer] (which disclaims liability or warranty for this information). If you have questions about a medical condition or this instruction, always ask your healthcare professional. [place] [address] any warranty or liability for your use of this information. Patient Education        Bronchitis: Care Instructions  Your Care Instructions    Bronchitis is inflammation of the bronchial tubes, which carry air to the lungs. The tubes swell and produce mucus, or phlegm. The mucus and inflamed bronchial tubes make you cough. You may have trouble breathing. Most cases of bronchitis are caused by viruses like those that cause colds. Antibiotics usually do not help and they may be harmful. Bronchitis usually develops rapidly and lasts about 2 to 3 weeks in otherwise healthy people. Follow-up care is a key part of your treatment and safety. Be sure to make and go to all appointments, and call your doctor if you are having problems. It's also a good idea to know your test results and keep a list of the medicines you take. How can you care for yourself at home? · Take all medicines exactly as prescribed. Call your doctor if you think you are having a problem with your medicine. · Get some extra rest.  · Take an over-the-counter pain medicine, such as acetaminophen (Tylenol), ibuprofen (Advil, Motrin), or naproxen (Aleve) to reduce fever and relieve body aches. Read and follow all instructions on the label. · Do not take two or more pain medicines at the same time unless the doctor told you to. Many pain medicines have acetaminophen, which is Tylenol.  Too much acetaminophen (Tylenol) can be harmful. · Take an over-the-counter cough medicine that contains dextromethorphan to help quiet a dry, hacking cough so that you can sleep. Avoid cough medicines that have more than one active ingredient. Read and follow all instructions on the label. · Breathe moist air from a humidifier, hot shower, or sink filled with hot water. The heat and moisture will thin mucus so you can cough it out. · Do not smoke. Smoking can make bronchitis worse. If you need help quitting, talk to your doctor about stop-smoking programs and medicines. These can increase your chances of quitting for good. When should you call for help? Call 911 anytime you think you may need emergency care. For example, call if:    · You have severe trouble breathing.    Call your doctor now or seek immediate medical care if:    · You have new or worse trouble breathing.     · You cough up dark brown or bloody mucus (sputum).     · You have a new or higher fever.     · You have a new rash.    Watch closely for changes in your health, and be sure to contact your doctor if:    · You cough more deeply or more often, especially if you notice more mucus or a change in the color of your mucus.     · You are not getting better as expected. Where can you learn more? Go to http://ladarius-pavel.info/. Enter H333 in the search box to learn more about \"Bronchitis: Care Instructions. \"  Current as of: September 5, 2018  Content Version: 12.1  © 7070-3542 [employer]. Care instructions adapted under license by [employer] (which disclaims liability or warranty for this information). If you have questions about a medical condition or this instruction, always ask your healthcare professional. [place] [address] any warranty or liability for your use of this information.          Patient Education        Sinusitis: Care Instructions  Your Care Instructions    Sinusitis is an infection of the lining of the sinus cavities in your head. Sinusitis often follows a cold. It causes pain and pressure in your head and face. In most cases, sinusitis gets better on its own in 1 to 2 weeks. But some mild symptoms may last for several weeks. Sometimes antibiotics are needed. Follow-up care is a key part of your treatment and safety. Be sure to make and go to all appointments, and call your doctor if you are having problems. It's also a good idea to know your test results and keep a list of the medicines you take. How can you care for yourself at home? · Take an over-the-counter pain medicine, such as acetaminophen (Tylenol), ibuprofen (Advil, Motrin), or naproxen (Aleve). Read and follow all instructions on the label. · If the doctor prescribed antibiotics, take them as directed. Do not stop taking them just because you feel better. You need to take the full course of antibiotics. · Be careful when taking over-the-counter cold or flu medicines and Tylenol at the same time. Many of these medicines have acetaminophen, which is Tylenol. Read the labels to make sure that you are not taking more than the recommended dose. Too much acetaminophen (Tylenol) can be harmful. · Breathe warm, moist air from a steamy shower, a hot bath, or a sink filled with hot water. Avoid cold, dry air. Using a humidifier in your home may help. Follow the directions for cleaning the machine. · Use saline (saltwater) nasal washes to help keep your nasal passages open and wash out mucus and bacteria. You can buy saline nose drops at a grocery store or drugstore. Or you can make your own at home by adding 1 teaspoon of salt and 1 teaspoon of baking soda to 2 cups of distilled water. If you make your own, fill a bulb syringe with the solution, insert the tip into your nostril, and squeeze gently. [place]  your nose.   · Put a hot, wet towel or a warm gel pack on your face 3 or 4 times a day for 5 to 10 minutes each time.  · Try a decongestant nasal spray like oxymetazoline (Afrin). Do not use it for more than 3 days in a row. Using it for more than 3 days can make your congestion worse. When should you call for help? Call your doctor now or seek immediate medical care if:    · You have new or worse swelling or redness in your face or around your eyes.     · You have a new or higher fever.    Watch closely for changes in your health, and be sure to contact your doctor if:    · You have new or worse facial pain.     · The mucus from your nose becomes thicker (like pus) or has new blood in it.     · You are not getting better as expected. Where can you learn more? Go to http://ladarius-pavel.info/. Enter Y642 in the search box to learn more about \"Sinusitis: Care Instructions. \"  Current as of: October 21, 2018  Content Version: 12.1  © 1742-6880 [employer]. Care instructions adapted under license by [email or web address] (which disclaims liability or warranty for this information). If you have questions about a medical condition or this instruction, always ask your healthcare professional. [place] [address] any warranty or liability for your use of this information.        USE INHALER EVERY 4 HOURS FOR THE NEXT 2 DAYS WHILE AWAKE    SALINE NASAL RINSE    IF UNABLE TO USE, OVER THE COUNTER FLONASE OR NASACORT TWICE A DAY FOR 5 DAYS    OVER THE COUNTER [address] OR CORICIDIN

## 2019-08-19 NOTE — ED PROVIDER NOTES
EMERGENCY DEPARTMENT HISTORY AND PHYSICAL EXAM      Date: 8/14/2019  Patient Name: Cesar Aranda    History of Presenting Illness     Chief Complaint   Patient presents with    Cough     x the week, became productive on Monday    Sinus Pain     headache, ear ache, sore throat       History Provided By: Patient    HPI: Cesar Aranda, 35 y.o. male with PMHx significant for HTN, DM, Hypothyroid, presents by POV  to the ED with cc of cough for 1 week. Pt states approx 1 week ago his cough started after several days it became more productive of greenish, yellow sputum. Pt states he has noticed an increase in shortness of breath especially with exertion. Pt states burning in his chest associated with cough. Pt denies any fever. He has noticed chills and decrease appetite. Pt states in addition t ocough he has been having sinus pressure and ear ache. Pain in chest rated 8/10, earache and sinuses 5/10. No alleviating factors. No abd pain, n/v/d. Pt denies any calf pain or tenderness. There are no other complaints, changes, or physical findings at this time. PCP: Morenita Heller Doctor, MD    No current facility-administered medications on file prior to encounter. Current Outpatient Medications on File Prior to Encounter   Medication Sig Dispense Refill    propranolol (INDERAL) 20 mg tablet Take  by mouth three (3) times daily.  metFORMIN (GLUCOPHAGE) 500 mg tablet TAKE 1 TABLET BY MOUTH TWICE DAILY WITH MEALS 180 Tab 3    insulin NPH (NOVOLIN N NPH U-100 INSULIN) 100 unit/mL injection by SubCUTAneous route once. 800-100 units twice a day      sertraline (ZOLOFT) 100 mg tablet Take 2 Tabs by mouth daily.  (Patient taking differently: Take 100 mg by mouth daily.) 60 Tab 11    busPIRone (BUSPAR) 15 mg tablet TAKE 1 TABLET BY MOUTH TWICE DAILY 60 Tab 3    lisinopril (PRINIVIL, ZESTRIL) 10 mg tablet TAKE 1 TABLET BY MOUTH ONCE DAILY 90 Tab 3    levothyroxine (SYNTHROID) 112 mcg tablet TAKE 1 TABLET BY MOUTH ONCE DAILY BEFORE BREAKFAST 90 Tab 3    butalbital-acetaminophen-caffeine (FIORICET, ESGIC) -40 mg per tablet TAKE 1 TABLET BY MOUTH AT THE ONSET OF HEADACHE. CAN TAKE TWICE DAILY BUT NOT MORE THAN 10 DAYS/MONTH.ONLY TO BE FILLED IN 1 MONTH INTERVALS 20 Tab 2    fenofibrate (LOFIBRA) 160 mg tablet TAKE 1 TABLET BY MOUTH ONCE DAILY 90 Tab 3    rosuvastatin (CRESTOR) 20 mg tablet Take 1 Tab by mouth nightly.  90 Tab 3    insulin regular (NOVOLIN R, HUMULIN R) 100 unit/mL injection 15-20 units TID AC (Patient taking differently: 15-20 units 2-3 times per day  Indications: 0-20u BID, per pt) 1 Vial 12       Past History     Past Medical History:  Past Medical History:   Diagnosis Date    Chronic headaches     Depression     Diabetes ([address]     GERD (gastroesophageal reflux disease)     Hypertension     Thyroid disease     hypothyroid    Unspecified sleep apnea     does not use CPAP       Past Surgical History:  Past Surgical History:   Procedure Laterality Date    HX CHOLECYSTECTOMY  14    Dr Bridges Bigger    HX HEENT      wisdom teeth removed    HX ORTHOPAEDIC Left 2012    carpel tunnel       Family History:  Family History   Problem Relation Age of Onset    Diabetes Mother     Heart Disease Father     Cancer Father     Diabetes Father     Diabetes Paternal Aunt     Diabetes Maternal Grandmother     Thyroid Cancer Maternal Grandmother     Kidney Disease Maternal Grandmother     Arthritis Maternal Grandmother     Heart Disease Maternal Grandfather     High Cholesterol Maternal Grandfather     Hypertension Maternal Grandfather     Diabetes Paternal Grandmother     Hemophilia Paternal Grandfather        Social History:  Social History     Tobacco Use    Smoking status: Former Smoker     Packs/day: 0.00     Years: 14.00     Pack years: 0.00     Last attempt to quit: 2014     Years since quittin.6    Smokeless tobacco: Never Used   Substance Use Topics    Alcohol use: No    Drug use: No       Allergies: Allergies   Allergen Reactions    Morphine Nausea and Vomiting    Penicillin G Swelling    Percocet [Oxycodone-Acetaminophen] Hives and Nausea and Vomiting    Sulfa (Sulfonamide Antibiotics) Swelling    Tramadol Nausea Only     Nausea and headache           Review of Systems   Review of Systems   Constitutional: Positive for appetite change and fatigue. Negative for chills and fever. HENT: Positive for congestion, ear pain, postnasal drip and rhinorrhea. Negative for sinus pressure and sore throat. Eyes: Negative. Respiratory: Positive for cough, shortness of breath and wheezing. Negative for choking and chest tightness. Cardiovascular: Negative. Negative for chest pain, palpitations and leg swelling. Gastrointestinal: Negative for abdominal pain, constipation, diarrhea, nausea and vomiting. Endocrine: Negative. Genitourinary: Negative. Negative for difficulty urinating, dysuria, flank pain and urgency. Musculoskeletal: Negative. Skin: Negative. Neurological: Negative. Negative for dizziness, speech difficulty, weakness, light-headedness, numbness and headaches. Psychiatric/Behavioral: Negative. All other systems reviewed and are negative. Physical Exam   Physical Exam   Constitutional: He is oriented to person, place, and time. He appears well-developed and well-nourished. No distress. HENT:   Head: Normocephalic and atraumatic. Mouth/Throat: Oropharynx is clear and moist. No oropharyngeal exudate. Left TM retracted, Right TM bulging, no effusion, no erythema   Eyes: Pupils are equal, round, and reactive to light. Conjunctivae and EOM are normal.   Neck: Normal range of motion. Neck supple. No JVD present. No tracheal deviation present. Cardiovascular: Normal rate, regular rhythm, normal heart sounds and intact distal pulses. No murmur heard. Pulmonary/Chest: Effort normal. No stridor. No respiratory distress. He has wheezes. He has no rales. He exhibits no tenderness. + rhonchi, no resp distress   Abdominal: Soft. He exhibits no distension. There is no tenderness. There is no rebound and no guarding. obese   Musculoskeletal: Normal range of motion. He exhibits no edema or tenderness. Neurological: He is alert and oriented to person, place, and time. No cranial nerve deficit. No gross motor or sensory deficits    Skin: Skin is warm and dry. He is not diaphoretic. Psychiatric: He has a normal mood and affect. His behavior is normal.   Nursing note and vitals reviewed. Diagnostic Study Results     Labs -   No results found for this or any previous visit (from the past 12 hour(s)). Radiologic Studies -   XR CHEST PA LAT   Final Result   IMPRESSION:   NORMAL CHEST. CT Results  (Last 48 hours)    None        CXR Results  (Last 48 hours)    None            Medical Decision Making   I am the first provider for this patient. I reviewed the vital signs, available nursing notes, past medical history, past surgical history, family history and social history. Vital Signs-Reviewed the patient's vital signs. No data found. Pulse Oximetry Analysis - 100% on RA      Records Reviewed: Nursing Notes, Old Medical Records, Previous Radiology Studies and Previous Laboratory Studies    Provider Notes (Medical Decision Making):   DDx- Bronchitis, sinusitis, pneumonia    ED Course:   Initial assessment performed. The patients presenting problems have been discussed, and they are in agreement with the care plan formulated and outlined with them. I have encouraged them to ask questions as they arise throughout their visit. Critical Care Time:   None    Disposition:  Dc home    PLAN:  1.    Discharge Medication List as of 8/15/2019  3:16 AM      START taking these medications    Details   HYDROcodone-homatropine (HYCODAN) 5-1.5 mg/5 mL syrup Take 5 mL by mouth four (4) times daily as needed for Cough for up to 6 days. Max Daily Amount: 20 mL., Print, Disp-120 mL, R-0      azithromycin (ZITHROMAX) 250 mg tablet Take 1 Tab by mouth daily for 4 days. , Normal, Disp-4 Tab, R-0         CONTINUE these medications which have NOT CHANGED    Details   propranolol (INDERAL) 20 mg tablet Take  by mouth three (3) times daily. , Historical Med      metFORMIN (GLUCOPHAGE) 500 mg tablet TAKE 1 TABLET BY MOUTH TWICE DAILY WITH MEALS, Normal, Disp-180 Tab, R-3      insulin NPH (NOVOLIN N NPH U-100 INSULIN) 100 unit/mL injection by SubCUTAneous route once. 800-100 units twice a day, Historical Med      sertraline (ZOLOFT) 100 mg tablet Take 2 Tabs by mouth daily. , NormalPlease consider 90 day supplies to promote better adherenceDisp-60 Tab, R-11      busPIRone (BUSPAR) 15 mg tablet TAKE 1 TABLET BY MOUTH TWICE DAILY, NormalPlease consider 90 day supplies to promote better adherenceDisp-60 Tab, R-3      lisinopril (PRINIVIL, ZESTRIL) 10 mg tablet TAKE 1 TABLET BY MOUTH ONCE DAILY, Normal, Disp-90 Tab, R-3      levothyroxine (SYNTHROID) 112 mcg tablet TAKE 1 TABLET BY MOUTH ONCE DAILY BEFORE BREAKFAST, Normal, Disp-90 Tab, R-3      butalbital-acetaminophen-caffeine (FIORICET, ESGIC) -40 mg per tablet TAKE 1 TABLET BY MOUTH AT THE ONSET OF HEADACHE. CAN TAKE TWICE DAILY BUT NOT MORE THAN 10 DAYS/MONTH.ONLY TO BE FILLED IN 1 MONTH INTERVALS, NormalPlease consider 90 day supplies to promote better adherenceDisp-20 Tab, R-2      fenofibrate (LOFIBRA) 160 mg tablet TAKE 1 TABLET BY MOUTH ONCE DAILY, NormalPlease consider 90 day supplies to promote better adherenceDisp-90 Tab, R-3      rosuvastatin (CRESTOR) 20 mg tablet Take 1 Tab by mouth nightly., Normal, Disp-90 Tab, R-3      insulin regular (NOVOLIN R, HUMULIN R) 100 unit/mL injection 15-20 units TID AC, No Print, Disp-1 Vial, R-12           2.    Follow-up Information     Follow up With Specialties Details Why Contact Info    Jani Heller MD [facility] [address]  [address] [facility]  [phone]          Return to ED if worse     Diagnosis     Clinical Impression:   1. Acute bronchitis, unspecified organism    2.  Acute sinusitis, recurrence not specified, unspecified location

## 2019-08-26 ENCOUNTER — OFFICE VISIT (OUTPATIENT)
Dept: FAMILY MEDICINE CLINIC | Age: 39
End: 2019-08-26

## 2019-08-26 VITALS
TEMPERATURE: 98 F | SYSTOLIC BLOOD PRESSURE: 122 MMHG | BODY MASS INDEX: 36.2 KG/M2 | OXYGEN SATURATION: 98 % | WEIGHT: 244.4 LBS | HEIGHT: 69 IN | DIASTOLIC BLOOD PRESSURE: 75 MMHG | HEART RATE: 71 BPM | RESPIRATION RATE: 16 BRPM

## 2019-08-26 DIAGNOSIS — F41.9 ANXIETY: ICD-10-CM

## 2019-08-26 RX ORDER — SERTRALINE HYDROCHLORIDE 100 MG/1
100 TABLET, FILM COATED ORAL DAILY
COMMUNITY
Start: 2019-08-26 | End: 2020-03-24 | Stop reason: SDUPTHER

## 2019-08-26 RX ORDER — BUSPIRONE HYDROCHLORIDE 15 MG/1
15 TABLET ORAL 3 TIMES DAILY
Qty: 90 TAB | Refills: 3 | Status: SHIPPED | OUTPATIENT
Start: 2019-08-26 | End: 2019-09-11 | Stop reason: SDUPTHER

## 2019-08-26 NOTE — PROGRESS NOTES
Chief Complaint   Patient presents with    ED Follow-up    Anxiety     MEDICATION EVALUATION     -weaning off propanolol  -worked up for dizziness by Dr. Asad Rodríguez  -had an MRI and carotid dopplers.     -was taking buspar once daily, increased to twice daily  -pt has been out of work due to multiple medical issues. Subjective: (As above and below)     Chief Complaint   Patient presents with    ED Follow-up    Anxiety     MEDICATION EVALUATION     he is a 35y.o. year old male who presents for evaluation. Reviewed PmHx, RxHx, FmHx, SocHx, AllgHx and updated in chart. Review of Systems - negative except as listed above    Objective:     Vitals:    08/26/19 1536   BP: 122/75   Pulse: 71   Resp: 16   Temp: 98 °F (36.7 °C)   TempSrc: Oral   SpO2: 98%   Weight: 244 lb 6.4 oz (110.9 kg)   Height: 5' 9\" (1.753 m)     Physical Examination: General appearance - alert, well appearing, and in no distress  Mental status - normal mood, behavior, speech, dress, motor activity, and thought processes  Mouth - mucous membranes moist, pharynx normal without lesions  Chest - clear to auscultation, no wheezes, rales or rhonchi, symmetric air entry  Heart - normal rate, regular rhythm, normal S1, S2, no murmurs, rubs, clicks or gallops  Musculoskeletal - no joint tenderness, deformity or swelling    Assessment/ Plan:   1. Anxiety  -increase buspar to three times a day  - sertraline (ZOLOFT) 100 mg tablet; Take 1 Tab by mouth daily. - busPIRone (BUSPAR) 15 mg tablet; Take 1 Tab by mouth three (3) times daily. Dispense: 90 Tab; Refill: 3     Follow up as needed or in one month    I have discussed the diagnosis with the patient and the intended plan as seen in the above orders. The patient has received an after-visit summary and questions were answered concerning future plans.      Medication Side Effects and Warnings were discussed with patient: yes  Patient Labs were reviewed: yes  Patient Past Records were reviewed: yes    Yazmin Dawn M.D.

## 2019-08-26 NOTE — PROGRESS NOTES
1. Have you been to the ER, urgent care clinic since your last visit? Hospitalized since your last visit? Yes, in [facility]    2. Have you seen or consulted any other health care providers outside of the [address] [place] [address] since your last visit? Include any pap smears or colon screening.  Yes, Dr. Burg[facility] [phone]     Health Maintenance Due   Topic Date Due    Pneumococcal 0-64 years (1 of 1 - PPSV23) 02/16/1985    EYE EXAM RETINAL OR DILATED  02/16/1989    Influenza Age 9 to Adult  08/01/2019

## 2019-09-03 ENCOUNTER — TELEPHONE (OUTPATIENT)
Dept: ENDOCRINOLOGY | Age: 39
End: 2019-09-03

## 2019-09-03 NOTE — TELEPHONE ENCOUNTER
I could not predict when the symptoms would resolve, so I picked a date that gave 2 months of leave before returning. If the symptoms have not resolved and his work needs an amended form, have them send the form to the office and I will amend it. Since his PCP is also evaluating him, I think he should have some input to the [facility] papers as well.

## 2019-09-03 NOTE — TELEPHONE ENCOUNTER
Spoke with patient. He was reading the last Short Term Disability form and noticed it said that he could return to work on 09/09/2019. He states that work wants him 100% better before returning to work. Patient states that he still has dizziness, feels \"fainty\" at times and has \"bad headaches\". He saw his PCP and has weaned off of Propranolol. PCP increased his Buspirone to 15 mg TID. He is not sure when he can return to work, but knows he cannot return next week.

## 2019-09-03 NOTE — TELEPHONE ENCOUNTER
Patient called wanting to speak with Oliver Meyer or Dr. Tristen Arthur. He has some questions about his short term disability claims.     Call back at [phone]

## 2019-09-11 DIAGNOSIS — F41.9 ANXIETY: ICD-10-CM

## 2019-09-11 RX ORDER — BUSPIRONE HYDROCHLORIDE 15 MG/1
15 TABLET ORAL 3 TIMES DAILY
Qty: 90 TAB | Refills: 3 | Status: SHIPPED | OUTPATIENT
Start: 2019-09-11 | End: 2020-03-24

## 2019-09-17 DIAGNOSIS — G43.009 MIGRAINE WITHOUT AURA AND WITHOUT STATUS MIGRAINOSUS, NOT INTRACTABLE: ICD-10-CM

## 2019-09-18 RX ORDER — BUTALBITAL, ACETAMINOPHEN AND CAFFEINE 50; 325; 40 MG/1; MG/1; MG/1
TABLET ORAL
Qty: 20 TAB | Refills: 2 | Status: SHIPPED | OUTPATIENT
Start: 2019-09-18 | End: 2020-01-13

## 2019-09-23 ENCOUNTER — APPOINTMENT (OUTPATIENT)
Dept: GENERAL RADIOLOGY | Age: 39
End: 2019-09-23
Attending: PHYSICIAN ASSISTANT
Payer: SELF-PAY

## 2019-09-23 ENCOUNTER — OFFICE VISIT (OUTPATIENT)
Dept: ENDOCRINOLOGY | Age: 39
End: 2019-09-23

## 2019-09-23 VITALS
WEIGHT: 252.8 LBS | BODY MASS INDEX: 37.44 KG/M2 | HEART RATE: 88 BPM | SYSTOLIC BLOOD PRESSURE: 131 MMHG | DIASTOLIC BLOOD PRESSURE: 68 MMHG | HEIGHT: 69 IN

## 2019-09-23 DIAGNOSIS — E10.9 TYPE 1 DIABETES MELLITUS WITHOUT COMPLICATION (HCC): Primary | ICD-10-CM

## 2019-09-23 DIAGNOSIS — R42 POSTURAL DIZZINESS WITH PRESYNCOPE: ICD-10-CM

## 2019-09-23 DIAGNOSIS — E03.9 ACQUIRED HYPOTHYROIDISM: ICD-10-CM

## 2019-09-23 DIAGNOSIS — R55 POSTURAL DIZZINESS WITH PRESYNCOPE: ICD-10-CM

## 2019-09-23 LAB
ALBUMIN SERPL-MCNC: 3.8 G/DL (ref 3.5–5)
ALBUMIN/GLOB SERPL: 1 {RATIO} (ref 1.1–2.2)
ALP SERPL-CCNC: 70 U/L (ref 45–117)
ALT SERPL-CCNC: 39 U/L (ref 12–78)
ANION GAP SERPL CALC-SCNC: 5 MMOL/L (ref 5–15)
AST SERPL-CCNC: 22 U/L (ref 15–37)
BASOPHILS # BLD: 0.1 K/UL (ref 0–0.1)
BASOPHILS NFR BLD: 1 % (ref 0–1)
BILIRUB SERPL-MCNC: 0.5 MG/DL (ref 0.2–1)
BUN SERPL-MCNC: 16 MG/DL (ref 6–20)
BUN/CREAT SERPL: 13 (ref 12–20)
CALCIUM SERPL-MCNC: 8.8 MG/DL (ref 8.5–10.1)
CHLORIDE SERPL-SCNC: 103 MMOL/L (ref 97–108)
CO2 SERPL-SCNC: 28 MMOL/L (ref 21–32)
CREAT SERPL-MCNC: 1.27 MG/DL (ref 0.7–1.3)
DIFFERENTIAL METHOD BLD: ABNORMAL
EOSINOPHIL # BLD: 0.2 K/UL (ref 0–0.4)
EOSINOPHIL NFR BLD: 3 % (ref 0–7)
ERYTHROCYTE [DISTWIDTH] IN BLOOD BY AUTOMATED COUNT: 13.3 % (ref 11.5–14.5)
GLOBULIN SER CALC-MCNC: 3.8 G/DL (ref 2–4)
GLUCOSE SERPL-MCNC: 243 MG/DL (ref 65–100)
HBA1C MFR BLD HPLC: 10.1 %
HCT VFR BLD AUTO: 40.3 % (ref 36.6–50.3)
HGB BLD-MCNC: 13.3 G/DL (ref 12.1–17)
IMM GRANULOCYTES # BLD AUTO: 0.1 K/UL (ref 0–0.04)
IMM GRANULOCYTES NFR BLD AUTO: 1 % (ref 0–0.5)
LYMPHOCYTES # BLD: 3.2 K/UL (ref 0.8–3.5)
LYMPHOCYTES NFR BLD: 37 % (ref 12–49)
MCH RBC QN AUTO: 28.2 PG (ref 26–34)
MCHC RBC AUTO-ENTMCNC: 33 G/DL (ref 30–36.5)
MCV RBC AUTO: 85.4 FL (ref 80–99)
MONOCYTES # BLD: 0.5 K/UL (ref 0–1)
MONOCYTES NFR BLD: 6 % (ref 5–13)
NEUTS SEG # BLD: 4.4 K/UL (ref 1.8–8)
NEUTS SEG NFR BLD: 52 % (ref 32–75)
NRBC # BLD: 0 K/UL (ref 0–0.01)
NRBC BLD-RTO: 0 PER 100 WBC
PLATELET # BLD AUTO: 245 K/UL (ref 150–400)
PMV BLD AUTO: 10.7 FL (ref 8.9–12.9)
POTASSIUM SERPL-SCNC: 4 MMOL/L (ref 3.5–5.1)
PROT SERPL-MCNC: 7.6 G/DL (ref 6.4–8.2)
RBC # BLD AUTO: 4.72 M/UL (ref 4.1–5.7)
SODIUM SERPL-SCNC: 136 MMOL/L (ref 136–145)
TROPONIN I SERPL-MCNC: <0.05 NG/ML
WBC # BLD AUTO: 8.5 K/UL (ref 4.1–11.1)

## 2019-09-23 PROCEDURE — 71046 X-RAY EXAM CHEST 2 VIEWS: CPT

## 2019-09-23 PROCEDURE — 84484 ASSAY OF TROPONIN QUANT: CPT

## 2019-09-23 PROCEDURE — 85025 COMPLETE CBC W/AUTO DIFF WBC: CPT

## 2019-09-23 PROCEDURE — 93005 ELECTROCARDIOGRAM TRACING: CPT

## 2019-09-23 PROCEDURE — 80053 COMPREHEN METABOLIC PANEL: CPT

## 2019-09-23 PROCEDURE — 36415 COLL VENOUS BLD VENIPUNCTURE: CPT

## 2019-09-23 PROCEDURE — 99284 EMERGENCY DEPT VISIT MOD MDM: CPT

## 2019-09-23 NOTE — PROGRESS NOTES
Chief Complaint   Patient presents with    Diabetes     pcp and pharmacy verified. Eye exam over a year     History of Present Illness: Evin Fatima is a 35 y.o. male here for follow up of diabetes. At our initial visit in June 2019 he was taking  units BID and Regular insulin (though not consistently) 15-20 units with meals. His A1C was 11.0%. Pt was instructed to decrease his NPH to 80 units in the AM and 80 units HS and to take Regular insulin 20 units WITH EACH MEAL. His A1C today was 10.1%. Pt continues to have issues of lightheadedness and dizziness. We have ruled out thyroid issues, hypoglycemia, hypotension and his CT and MRI of the brain were unremarkable and his carotid doppler was unremarkable. Pt notes he is starting to have Migraine HA's, which he notes he used to have in the past.    Pt is taking the NPH 80 units in the morning and HS (about 12 hours apart). He is taking the Regular insulin 10 units with each meal. He notes that if he takes more than 10 units he feels that he tends to have episodes of drops. He notes his BGs have been in the 120-300's range, but he is not having anymore issues of BGs in the 400-500's range. \"I feel very stressed out and run down\". A typical day is as follows:  Pt has been off work on disability since June 2019. He notes that he is waking around 8AM-Noon. He will eat breakfast, he will have a microwave turkey \"hungry man\" and water or diet soda. He is having dinner around 630-930PM, last night he had sushi and a 6\" Nazario sub and diet soda. No history of vascular disease. Pt has hx of neuropathy. No hx of nephropathy. Last eye exam was \"I can not recall when the last time I saw an eye doctor\". Pt has hx of hypothyroidism, since the age of 11-9 years old. He is currently taking 112mcg daily.       Current Outpatient Medications   Medication Sig    butalbital-acetaminophen-caffeine (FIORICET, ESGIC) -40 mg per tablet TAKE 1 TABLET BY MOUTH AT THE ONSET OF HEADACHE. CAN TAKE TWICE DAILY BUT NOT MORE THAN 10 DAYS/MONTH.ONLY TO BE FILLED IN 1 MONTH INTERVALS    busPIRone (BUSPAR) 15 mg tablet Take 1 Tab by mouth three (3) times daily. (Patient taking differently: Take 15 mg by mouth two (2) times a day.)    sertraline (ZOLOFT) 100 mg tablet Take 1 Tab by mouth daily.  metFORMIN (GLUCOPHAGE) 500 mg tablet TAKE 1 TABLET BY MOUTH TWICE DAILY WITH MEALS    insulin NPH (NOVOLIN N NPH U-100 INSULIN) 100 unit/mL injection by SubCUTAneous route once. 80 units twice a day    lisinopril (PRINIVIL, ZESTRIL) 10 mg tablet TAKE 1 TABLET BY MOUTH ONCE DAILY    levothyroxine (SYNTHROID) 112 mcg tablet TAKE 1 TABLET BY MOUTH ONCE DAILY BEFORE BREAKFAST    fenofibrate (LOFIBRA) 160 mg tablet TAKE 1 TABLET BY MOUTH ONCE DAILY    rosuvastatin (CRESTOR) 20 mg tablet Take 1 Tab by mouth nightly.  insulin regular (NOVOLIN R, HUMULIN R) 100 unit/mL injection 15-20 units TID AC (Patient taking differently: 10 units 2-3 times per day  Indications: 0-20u BID, per pt)     No current facility-administered medications for this visit. Allergies   Allergen Reactions    Morphine Nausea and Vomiting    Penicillin G Swelling    Percocet [Oxycodone-Acetaminophen] Hives and Nausea and Vomiting    Sulfa (Sulfonamide Antibiotics) Swelling    Tramadol Nausea Only     Nausea and headache       Review of Systems:  - Eyes: no blurry vision or double vision  - Cardiovascular: no chest pain  - Respiratory: no shortness of breath  - Musculoskeletal: no myalgias  - Neurological: no numbness/tingling in extremities    Physical Examination:  Blood pressure 131/68, pulse 88, height 5' 9\" (1.753 m), weight 252 lb 12.8 oz (114.7 kg).   - General: pleasant, no distress, good eye contact   - Neck: no carotid bruits  - Cardiovascular: regular, normal rate, nl s1 and s2, no m/r/g, 2+ DP pulses   - Respiratory: clear bilaterally  - Integumentary: no edema, no foot ulcers,   - Psychiatric: normal mood and affect    Diabetic foot exam:     Left Foot:   Visual Exam: callous - present   Pulse DP: 2+ (normal)   Filament test: normal sensation    Vibratory sensation: normal      Right Foot:   Visual Exam: callous - present   Pulse DP: 2+ (normal)   Filament test: normal sensation    Vibratory sensation: normal        Data Reviewed:   Component      Latest Ref Rng & Units 8/15/2019 8/15/2019 7/8/2019           1:29 AM  1:29 AM  1:43 PM   WBC      4.1 - 11.1 K/uL  11.3 (H)    RBC      4.10 - 5.70 M/uL  5.09    HGB      12.1 - 17.0 g/dL  14.7    HCT      36.6 - 50.3 %  43.3    MCV      80.0 - 99.0 FL  85.1    MCH      26.0 - 34.0 PG  28.9    MCHC      30.0 - 36.5 g/dL  33.9    RDW      11.5 - 14.5 %  13.0    PLATELET      421 - 619 K/uL  251    MPV      8.9 - 12.9 FL  10.5    NRBC      0  WBC  0.0    ABSOLUTE NRBC      0.00 - 0.01 K/uL  0.00    NEUTROPHILS      32 - 75 %  59    LYMPHOCYTES      12 - 49 %  27    MONOCYTES      5 - 13 %  9    EOSINOPHILS      0 - 7 %  3    BASOPHILS      0 - 1 %  1    IMMATURE GRANULOCYTES      0.0 - 0.5 %  1 (H)    ABS. NEUTROPHILS      1.8 - 8.0 K/UL  6.7    ABS. LYMPHOCYTES      0.8 - 3.5 K/UL  3.0    ABS. MONOCYTES      0.0 - 1.0 K/UL  1.0    ABS. EOSINOPHILS      0.0 - 0.4 K/UL  0.3    ABS. BASOPHILS      0.0 - 0.1 K/UL  0.1    ABS. IMM.  GRANS.      0.00 - 0.04 K/UL  0.1 (H)    DF        AUTOMATED    Sodium      136 - 145 mmol/L 134 (L)  130 (L)   Potassium      3.5 - 5.1 mmol/L 3.9  4.3   Chloride      97 - 108 mmol/L 99  97   CO2      21 - 32 mmol/L 29  26   Anion gap      5 - 15 mmol/L 6  7   Glucose      65 - 100 mg/dL 143 (H)  357 (H)   BUN      6 - 20 MG/DL 16  25 (H)   Creatinine      0.70 - 1.30 MG/DL 1.27  1.26   BUN/Creatinine ratio      12 - 20   13  20   GFR est AA      >60 ml/min/1.73m2 >60  >60   GFR est non-AA      >60 ml/min/1.73m2 >60  >60   Calcium      8.5 - 10.1 MG/DL 9.3  9.0   Bilirubin, total      0.2 - 1.0 MG/DL 0.4 0. 4   ALT (SGPT)      12 - 78 U/L 37  52   AST      15 - 37 U/L 15  14 (L)   Alk. phosphatase      45 - 117 U/L 135 (H)  125 (H)   Protein, total      6.4 - 8.2 g/dL 8.2  7.8   Albumin      3.5 - 5.0 g/dL 3.6  3.8   Globulin      2.0 - 4.0 g/dL 4.6 (H)  4.0   A-G Ratio      1.1 - 2.2   0.8 (L)  1.0 (L)   Hemoglobin A1c, (calculated)      4.8 - 5.6 %      Estimated average glucose      mg/dL      TSH      0.450 - 4.500 uIU/mL      T4, Free      0.82 - 1.77 ng/dL        Component      Latest Ref Rng & Units 7/8/2019 6/17/2019 6/17/2019 6/17/2019           1:43 PM  3:40 PM  3:40 PM  3:40 PM   WBC      4.1 - 11.1 K/uL 9.7      RBC      4.10 - 5.70 M/uL 4.93      HGB      12.1 - 17.0 g/dL 14.2      HCT      36.6 - 50.3 % 41.4      MCV      80.0 - 99.0 FL 84.0      MCH      26.0 - 34.0 PG 28.8      MCHC      30.0 - 36.5 g/dL 34.3      RDW      11.5 - 14.5 % 13.1      PLATELET      890 - 282 K/uL 245      MPV      8.9 - 12.9 FL 10.8      NRBC      0  WBC 0.0      ABSOLUTE NRBC      0.00 - 0.01 K/uL 0.00      NEUTROPHILS      32 - 75 % 58      LYMPHOCYTES      12 - 49 % 31      MONOCYTES      5 - 13 % 7      EOSINOPHILS      0 - 7 % 2      BASOPHILS      0 - 1 % 1      IMMATURE GRANULOCYTES      0.0 - 0.5 % 1 (H)      ABS. NEUTROPHILS      1.8 - 8.0 K/UL 5.7      ABS. LYMPHOCYTES      0.8 - 3.5 K/UL 3.0      ABS. MONOCYTES      0.0 - 1.0 K/UL 0.7      ABS. EOSINOPHILS      0.0 - 0.4 K/UL 0.2      ABS. BASOPHILS      0.0 - 0.1 K/UL 0.1      ABS. IMM.  GRANS.      0.00 - 0.04 K/UL 0.1 (H)      DF       AUTOMATED      Sodium      136 - 145 mmol/L       Potassium      3.5 - 5.1 mmol/L       Chloride      97 - 108 mmol/L       CO2      21 - 32 mmol/L       Anion gap      5 - 15 mmol/L       Glucose      65 - 100 mg/dL       BUN      6 - 20 MG/DL       Creatinine      0.70 - 1.30 MG/DL       BUN/Creatinine ratio      12 - 20         GFR est AA      >60 ml/min/1.73m2       GFR est non-AA      >60 ml/min/1.73m2 Calcium      8.5 - 10.1 MG/DL       Bilirubin, total      0.2 - 1.0 MG/DL       ALT (SGPT)      12 - 78 U/L       AST      15 - 37 U/L       Alk. phosphatase      45 - 117 U/L       Protein, total      6.4 - 8.2 g/dL       Albumin      3.5 - 5.0 g/dL       Globulin      2.0 - 4.0 g/dL       A-G Ratio      1.1 - 2.2         Hemoglobin A1c, (calculated)      4.8 - 5.6 %    11.0 (H)   Estimated average glucose      mg/dL    269   TSH      0.450 - 4.500 uIU/mL   1.710    T4, Free      0.82 - 1.77 ng/dL  1.31       His A1C today was 10.1%. Assessment/Plan:   1) DM > His A1C today was 10.1%. Will have pt increase his Regular insulin to 12 units with meals, continue the NPH 80 units BID and check his BGs 4 times per day and mail his BG logs to me in 3 weeks. 2) Hypothyroidism > Pt is euthyroid on LT4 112mcg daily. 3) Dizziness > I have evaluated him for thyroid abnormalities, he is not having hypoglycemia or hypotension during these episodes. I ordered CT and MRI of the head/brain, which were negative and carotid dopplers, which were unremarkable. I will refer to Neurology for further evaluation. Pt voices understanding and agreement with the plan.   Pain noted and pt was recommended to call his PCP for further evaluation and treatment, as needed    RTC 3 months    Copy sent to:  Dr. Marjan Begum

## 2019-09-24 ENCOUNTER — HOSPITAL ENCOUNTER (EMERGENCY)
Age: 39
Discharge: HOME | End: 2019-09-24
Attending: EMERGENCY MEDICINE
Payer: SELF-PAY

## 2019-09-24 VITALS
TEMPERATURE: 98.8 F | HEART RATE: 85 BPM | OXYGEN SATURATION: 94 % | WEIGHT: 253.53 LBS | RESPIRATION RATE: 20 BRPM | HEIGHT: 69 IN | SYSTOLIC BLOOD PRESSURE: 110 MMHG | DIASTOLIC BLOOD PRESSURE: 82 MMHG | BODY MASS INDEX: 37.55 KG/M2

## 2019-09-24 DIAGNOSIS — F41.1 ANXIETY STATE: ICD-10-CM

## 2019-09-24 DIAGNOSIS — R07.89 ATYPICAL CHEST PAIN: Primary | ICD-10-CM

## 2019-09-24 LAB
ATRIAL RATE: 89 BPM
CALCULATED P AXIS, ECG09: 43 DEGREES
CALCULATED R AXIS, ECG10: 18 DEGREES
CALCULATED T AXIS, ECG11: 32 DEGREES
DIAGNOSIS, 93000: NORMAL
P-R INTERVAL, ECG05: 154 MS
Q-T INTERVAL, ECG07: 340 MS
QRS DURATION, ECG06: 88 MS
QTC CALCULATION (BEZET), ECG08: 413 MS
TROPONIN I SERPL-MCNC: <0.05 NG/ML
VENTRICULAR RATE, ECG03: 89 BPM

## 2019-09-24 PROCEDURE — 36415 COLL VENOUS BLD VENIPUNCTURE: CPT

## 2019-09-24 PROCEDURE — 84484 ASSAY OF TROPONIN QUANT: CPT

## 2019-09-24 PROCEDURE — 74011250636 HC RX REV CODE- 250/636: Performed by: EMERGENCY MEDICINE

## 2019-09-24 RX ORDER — LORAZEPAM 2 MG/ML
0.5 INJECTION INTRAMUSCULAR
Status: COMPLETED | OUTPATIENT
Start: 2019-09-24 | End: 2019-09-24

## 2019-09-24 RX ORDER — CYCLOBENZAPRINE HCL 10 MG
10 TABLET ORAL
COMMUNITY
End: 2020-03-12

## 2019-09-24 RX ORDER — ASPIRIN 81 MG/1
81 TABLET ORAL DAILY
Qty: 30 TAB | Refills: 0 | Status: SHIPPED | OUTPATIENT
Start: 2019-09-24

## 2019-09-24 RX ORDER — METOPROLOL SUCCINATE 25 MG/1
25 TABLET, EXTENDED RELEASE ORAL DAILY
Qty: 30 TAB | Refills: 0 | Status: SHIPPED | OUTPATIENT
Start: 2019-09-24 | End: 2019-10-17

## 2019-09-24 RX ORDER — LORAZEPAM 0.5 MG/1
0.5 TABLET ORAL
Qty: 20 TAB | Refills: 0 | Status: SHIPPED | OUTPATIENT
Start: 2019-09-24 | End: 2019-10-17 | Stop reason: SDUPTHER

## 2019-09-24 RX ADMIN — LORAZEPAM 0.5 MG: 2 INJECTION, SOLUTION INTRAMUSCULAR; INTRAVENOUS at 03:14

## 2019-09-24 NOTE — ED NOTES
Discharge instructions given to patient by MD Verduzco. Verbalized understanding of instructions. Patient discharged without difficulty. Patient discharged in stable condition via ambulation accompanied by Mother.

## 2019-09-24 NOTE — ED PROVIDER NOTES
EMERGENCY DEPARTMENT HISTORY AND PHYSICAL EXAM      Date: 9/24/2019  Patient Name: Amy Mendoza    History of Presenting Illness     Chief Complaint   Patient presents with    Chest Pain     pain in upper left chest x 2 weeks       History Provided By: Patient and Patient's Mother    HPI: Amy Mendoza, 35 y.o. male with PMHx significant for hypertension, diabetes, anxiety, and migraines presents to the emergency room with chief complaint of chest pain that has been going on off and on for the past 2 weeks. Pain is a pressure-like pain and is intermittent. Patient states he has been under a lot of stress lately. His chest pain, when it occurs is associated with some shortness of breath, nausea but no vomiting, palpitations, and dizziness. Patient is currently here in the hospital visiting his father who is an inpatient. Patient has not had a stress test or other cardiac evaluation in the past.  He was visiting his father tonight when he had an episode of pain in his chest which was located in the substernal area. It was brief l but he was concerned so he came to the emergency room. Patient states his pain is 6 out of 10 in intensity. PCP: [facility] MD Porfirio    No current facility-administered medications on file prior to encounter. Current Outpatient Medications on File Prior to Encounter   Medication Sig Dispense Refill    cyclobenzaprine (FLEXERIL) 10 mg tablet Take 10 mg by mouth three (3) times daily as needed for Muscle Spasm(s).  butalbital-acetaminophen-caffeine (FIORICET, ESGIC) -40 mg per tablet TAKE 1 TABLET BY MOUTH AT THE ONSET OF HEADACHE. CAN TAKE TWICE DAILY BUT NOT MORE THAN 10 DAYS/MONTH.ONLY TO BE FILLED IN 1 MONTH INTERVALS 20 Tab 2    busPIRone (BUSPAR) 15 mg tablet Take 1 Tab by mouth three (3) times daily. (Patient taking differently: Take 15 mg by mouth two (2) times a day.) 90 Tab 3    sertraline (ZOLOFT) 100 mg tablet Take 1 Tab by mouth daily.  metFORMIN (GLUCOPHAGE) 500 mg tablet TAKE 1 TABLET BY MOUTH TWICE DAILY WITH MEALS 180 Tab 3    insulin NPH (NOVOLIN N NPH U-100 INSULIN) 100 unit/mL injection by SubCUTAneous route once. 80 units twice a day      lisinopril (PRINIVIL, ZESTRIL) 10 mg tablet TAKE 1 TABLET BY MOUTH ONCE DAILY 90 Tab 3    levothyroxine (SYNTHROID) 112 mcg tablet TAKE 1 TABLET BY MOUTH ONCE DAILY BEFORE BREAKFAST 90 Tab 3    fenofibrate (LOFIBRA) 160 mg tablet TAKE 1 TABLET BY MOUTH ONCE DAILY 90 Tab 3    rosuvastatin (CRESTOR) 20 mg tablet Take 1 Tab by mouth nightly.  90 Tab 3    insulin regular (NOVOLIN R, HUMULIN R) 100 unit/mL injection 15-20 units TID AC (Patient taking differently: 10 units 2-3 times per day  Indications: 0-20u BID, per pt) 1 Vial 12       Past History     Past Medical History:  Past Medical History:   Diagnosis Date    Chronic headaches 2007    Depression     Diabetes ([address]     GERD (gastroesophageal reflux disease)     Hypertension     Thyroid disease     hypothyroid    Unspecified sleep apnea     does not use CPAP       Past Surgical History:  Past Surgical History:   Procedure Laterality Date    HX CHOLECYSTECTOMY  8/26/14    Dr Christiana Denise    HX HEENT      wisdom teeth removed    HX ORTHOPAEDIC Left 2012    carpel tunnel       Family History:  Family History   Problem Relation Age of Onset    Diabetes Mother     Heart Disease Father     Cancer Father     Diabetes Father     Diabetes Paternal Aunt     Diabetes Maternal Grandmother     Thyroid Cancer Maternal Grandmother     Kidney Disease Maternal Grandmother     Arthritis Maternal Grandmother     Heart Disease Maternal Grandfather     High Cholesterol Maternal Grandfather     Hypertension Maternal Grandfather     Diabetes Paternal Grandmother     Hemophilia Paternal Grandfather        Social History:  Social History     Tobacco Use    Smoking status: Former Smoker     Packs/day: 0.00     Years: 14.00     Pack years: 0.00 Last attempt to quit: 2014     Years since quittin.7    Smokeless tobacco: Never Used   Substance Use Topics    Alcohol use: No    Drug use: No       Allergies: Allergies   Allergen Reactions    Morphine Nausea and Vomiting    Penicillin G Swelling    Percocet [Oxycodone-Acetaminophen] Hives and Nausea and Vomiting    Sulfa (Sulfonamide Antibiotics) Swelling    Tramadol Nausea Only     Nausea and headache           Review of Systems   Review of Systems   Constitutional: Negative for chills, diaphoresis and fever. HENT: Negative for congestion, ear pain, rhinorrhea and sore throat. Eyes: Negative. Respiratory: Positive for chest tightness and shortness of breath. Negative for cough and wheezing. Cardiovascular: Positive for palpitations. Gastrointestinal: Positive for nausea. Negative for abdominal pain, diarrhea and vomiting. Genitourinary: Negative for dysuria, flank pain and hematuria. Musculoskeletal: Negative for back pain and myalgias. Skin: Negative for rash and wound. Neurological: Positive for dizziness. Negative for seizures, syncope, speech difficulty, numbness and headaches. Psychiatric/Behavioral: Negative for behavioral problems. The patient is not nervous/anxious.           Physical Exam   General appearance -anxious, overweight well nourished, well appearing, and in no distress  Eyes - pupils equal and reactive, extraocular eye movements intact  ENT - mucous membranes moist, pharynx normal without lesions  Neck - supple, no significant adenopathy; non-tender to palpation  Chest - clear to auscultation, no wheezes, rales or rhonchi; mild anterior chest wall tenderness  Heart - normal rate and regular rhythm, S1 and S2 normal, no murmurs noted  Abdomen - soft, nontender, nondistended, no masses or organomegaly  Musculoskeletal - no joint tenderness, deformity or swelling; normal ROM  Extremities - peripheral pulses normal, no pedal edema  Skin - normal coloration and turgor, no rashes  Neurological - alert, oriented x3, normal speech, no focal findings or movement disorder noted    Diagnostic Study Results     Labs -     Recent Results (from the past 12 hour(s))   EKG, 12 LEAD, INITIAL    Collection Time: 09/23/19  9:18 PM   Result Value Ref Range    Ventricular Rate 89 BPM    Atrial Rate 89 BPM    P-R Interval 154 ms    QRS Duration 88 ms    Q-T Interval 340 ms    QTC Calculation (Bezet) 413 ms    Calculated P Axis 43 degrees    Calculated R Axis 18 degrees    Calculated T Axis 32 degrees    Diagnosis       Normal sinus rhythm  Normal ECG  When compared with ECG of 26-JAN-2013 22:53,  No significant change was found     CBC WITH AUTOMATED DIFF    Collection Time: 09/23/19  9:54 PM   Result Value Ref Range    WBC 8.5 4.1 - 11.1 K/uL    RBC 4.72 4.10 - 5.70 M/uL    HGB 13.3 12.1 - 17.0 g/dL    HCT 40.3 36.6 - 50.3 %    MCV 85.4 80.0 - 99.0 FL    MCH 28.2 26.0 - 34.0 PG    MCHC 33.0 30.0 - 36.5 g/dL    RDW 13.3 11.5 - 14.5 %    PLATELET 012 497 - 971 K/uL    MPV 10.7 8.9 - 12.9 FL    NRBC 0.0 0  WBC    ABSOLUTE NRBC 0.00 0.00 - 0.01 K/uL    NEUTROPHILS 52 32 - 75 %    LYMPHOCYTES 37 12 - 49 %    MONOCYTES 6 5 - 13 %    EOSINOPHILS 3 0 - 7 %    BASOPHILS 1 0 - 1 %    IMMATURE GRANULOCYTES 1 (H) 0.0 - 0.5 %    ABS. NEUTROPHILS 4.4 1.8 - 8.0 K/UL    ABS. LYMPHOCYTES 3.2 0.8 - 3.5 K/UL    ABS. MONOCYTES 0.5 0.0 - 1.0 K/UL    ABS. EOSINOPHILS 0.2 0.0 - 0.4 K/UL    ABS. BASOPHILS 0.1 0.0 - 0.1 K/UL    ABS. IMM.  GRANS. 0.1 (H) 0.00 - 0.04 K/UL    DF AUTOMATED     METABOLIC PANEL, COMPREHENSIVE    Collection Time: 09/23/19  9:54 PM   Result Value Ref Range    Sodium 136 136 - 145 mmol/L    Potassium 4.0 3.5 - 5.1 mmol/L    Chloride 103 97 - 108 mmol/L    CO2 28 21 - 32 mmol/L    Anion gap 5 5 - 15 mmol/L    Glucose 243 (H) 65 - 100 mg/dL    BUN 16 6 - 20 MG/DL    Creatinine 1.27 0.70 - 1.30 MG/DL    BUN/Creatinine ratio 13 12 - 20      GFR est AA >60 >60 ml/min/1.73m2    GFR est non-AA >60 >60 ml/min/1.73m2    Calcium 8.8 8.5 - 10.1 MG/DL    Bilirubin, total 0.5 0.2 - 1.0 MG/DL    ALT (SGPT) 39 12 - 78 U/L    AST (SGOT) 22 15 - 37 U/L    Alk. phosphatase 70 45 - 117 U/L    Protein, total 7.6 6.4 - 8.2 g/dL    Albumin 3.8 3.5 - 5.0 g/dL    Globulin 3.8 2.0 - 4.0 g/dL    A-G Ratio 1.0 (L) 1.1 - 2.2     TROPONIN I    Collection Time: 09/23/19  9:54 PM   Result Value Ref Range    Troponin-I, Qt. <0.05 <0.05 ng/mL   TROPONIN I    Collection Time: 09/24/19  3:13 AM   Result Value Ref Range    Troponin-I, Qt. <0.05 <0.05 ng/mL       Radiologic Studies -   XR CHEST PA LAT   Final Result   IMPRESSION: Normal chest.           CT Results  (Last 48 hours)    None        CXR Results  (Last 48 hours)               09/23/19 2305  XR CHEST PA LAT Final result    Impression:  IMPRESSION: Normal chest.           Narrative:  INDICATION: Left upper chest pain       COMPARISON: August 15, 2019       FINDINGS: PA and lateral views of the chest demonstrate a stable   cardiomediastinal silhouette and clear lungs bilaterally. The visualized osseous   structures are unremarkable. Medical Decision Making   I am the first provider for this patient. I reviewed the vital signs, available nursing notes, past medical history, past surgical history, family history and social history. Vital Signs-Reviewed the patient's vital signs. Patient Vitals for the past 12 hrs:   Temp Pulse Resp BP SpO2   09/23/19 2110 98.8 °F (37.1 °C) 85 20 (!) 143/94 98 %       EKG: Normal sinus rhythm, 89 bpm, normal axis, normal IA, QRS, QTc intervals, no ischemic changes. Records Reviewed: Nursing Notes and Old Medical Records    Provider Notes (Medical Decision Making):   Differential diagnosis: GERD, chest wall strain, acute coronary syndrome, pneumonia    ED Course:   Initial assessment performed.  The patients presenting problems have been discussed, and they are in agreement with the care plan formulated and outlined with them. I have encouraged them to ask questions as they arise throughout their visit. Progress Notes:  ED Course as of Sep 24 0451   Tue Sep 24, 2019   0450 Patient is feeling better after Ativan in the emergency room. 2 sets of cardiac enzymes are negative. Will discharge with aspirin and Toprol and encourage close follow-up with Dr. Maryellen Bridges.    [AO]      ED Course User Index  [AO] Raquel Capone April MD AGUSTO       Disposition:  Discharge home    PLAN:  1. Current Discharge Medication List      START taking these medications    Details   aspirin delayed-release 81 mg tablet Take 1 Tab by mouth daily. Qty: 30 Tab, Refills: 0      metoprolol succinate (TOPROL XL) 25 mg XL tablet Take 1 Tab by mouth daily. Qty: 30 Tab, Refills: 0      LORazepam (ATIVAN) 0.5 mg tablet Take 1 Tab by mouth every eight (8) hours as needed for Anxiety. Max Daily Amount: 1.5 mg.  Qty: 20 Tab, Refills: 0    Associated Diagnoses: Anxiety state           2. Follow-up Information     Follow up With Specialties Details Why Contact Info    [facility] EMERGENCY DEPT Emergency Medicine  If symptoms worsen [address] [place]    Adi Pena MD Cardiology Schedule an appointment as soon as possible for a visit  [phone]          Return to ED if worse     Diagnosis     Clinical Impression:   1. Atypical chest pain    2.  Anxiety state

## 2019-09-24 NOTE — ED TRIAGE NOTES
Pt arrived from triage ambulatory. Pt stated he has had CP x2 weeks. Pt reports being under a lot of stress recently with his father being in the hospital.  Pt reports lack of energy and pain in right neck shooting to head. Pt reports having migraines with blurred vision. Pt states he had a neuro appointment tomorrow (9/25). Pt states having chills and diarrhea.   Denies N/V.

## 2019-09-24 NOTE — DISCHARGE INSTRUCTIONS
Patient Education        Anxiety Disorder: Care Instructions  Your Care Instructions    Anxiety is a normal reaction to stress. Difficult situations can cause you to have symptoms such as sweaty palms and a nervous feeling. In an anxiety disorder, the symptoms are far more severe. Constant worry, muscle tension, trouble sleeping, nausea and diarrhea, and other symptoms can make normal daily activities difficult or impossible. These symptoms may occur for no reason, and they can affect your work, school, or social life. Medicines, counseling, and self-care can all help. Follow-up care is a key part of your treatment and safety. Be sure to make and go to all appointments, and call your doctor if you are having problems. It's also a good idea to know your test results and keep a list of the medicines you take. How can you care for yourself at home? · Take medicines exactly as directed. Call your doctor if you think you are having a problem with your medicine. · Go to your counseling sessions and follow-up appointments. · Recognize and accept your anxiety. Then, when you are in a situation that makes you anxious, say to yourself, \"This is not an emergency. I feel uncomfortable, but I am not in danger. I can keep going even if I feel anxious. \"  · Be kind to your body:  ? Relieve tension with exercise or a massage. ? Get enough rest.  ? Avoid alcohol, caffeine, nicotine, and illegal drugs. They can increase your anxiety level and cause sleep problems. ? Learn and do relaxation techniques. See below for more about these techniques. · Engage your mind. Get out and do something you enjoy. Go to a funny movie, or take a walk or hike. Plan your day. Having too much or too little to do can make you anxious. · Keep a record of your symptoms. Discuss your fears with a good friend or family member, or join a support group for people with similar problems. Talking to others sometimes relieves stress.   · Get involved in social groups, or volunteer to help others. Being alone sometimes makes things seem worse than they are. · Get at least 30 minutes of exercise on most days of the week to relieve stress. Walking is a good choice. You also may want to do other activities, such as running, swimming, cycling, or playing tennis or team sports. Relaxation techniques  Do relaxation exercises 10 to 20 minutes a day. You can play soothing, relaxing music while you do them, if you wish. · Tell others in your house that you are going to do your relaxation exercises. Ask them not to disturb you. · Find a comfortable place, away from all distractions and noise. · Lie down on your back, or sit with your back straight. · Focus on your breathing. Make it slow and steady. · Breathe in through your nose. Breathe out through either your nose or mouth. · Breathe deeply, filling up the area between your navel and your rib cage. Breathe so that your belly goes up and down. · Do not hold your breath. · Breathe like this for 5 to 10 minutes. Notice the feeling of calmness throughout your whole body. As you continue to breathe slowly and deeply, relax by doing the following for another 5 to 10 minutes:  · Tighten and relax each muscle group in your body. You can begin at your toes and work your way up to your head. · Imagine your muscle groups relaxing and becoming heavy. · Empty your mind of all thoughts. · Let yourself relax more and more deeply. · Become aware of the state of calmness that surrounds you. · When your relaxation time is over, you can bring yourself back to alertness by moving your fingers and toes and then your hands and feet and then stretching and moving your entire body. Sometimes people fall asleep during relaxation, but they usually wake up shortly afterward. · Always give yourself time to return to full alertness before you drive a car or do anything that might cause an accident if you are not fully alert.  Never play a relaxation tape while you drive a car. When should you call for help? Call 911 anytime you think you may need emergency care. For example, call if:    · You feel you cannot stop from hurting yourself or someone else.   Michael Rhodes the numbers for these national suicide hotlines: 8-294-855-TALK (7-593.272.6152) and 2-511-GNGXZFE (7-469.113.4978). If you or someone you know talks about suicide or feeling hopeless, get help right away.   Watch closely for changes in your health, and be sure to contact your doctor if:    · You have anxiety or fear that affects your life.     · You have symptoms of anxiety that are new or different from those you had before. Where can you learn more? Go to http://ladariusLivefyrepavel.info/. Enter P754 in the search box to learn more about \"Anxiety Disorder: Care Instructions. \"  Current as of: May 28, 2019  Content Version: 12.2  © 3064-4740 [employer]. Care instructions adapted under license by [employer] (which disclaims liability or warranty for this information). If you have questions about a medical condition or this instruction, always ask your healthcare professional. [address] any warranty or liability for your use of this information. Patient Education        Chest Pain: Care Instructions  Your Care Instructions    There are many things that can cause chest pain. Some are not serious and will get better on their own in a few days. But some kinds of chest pain need more testing and treatment. Your doctor may have recommended a follow-up visit in the next 8 to 12 hours. If you are not getting better, you may need more tests or treatment. Even though your doctor has released you, you still need to watch for any problems. The doctor carefully checked you, but sometimes problems can develop later. If you have new symptoms or if your symptoms do not get better, get medical care right away.   If you have worse or different chest pain or pressure that lasts more than 5 minutes or you passed out (lost consciousness), call 911 or seek other emergency help right away. A medical visit is only one step in your treatment. Even if you feel better, you still need to do what your doctor recommends, such as going to all suggested follow-up appointments and taking medicines exactly as directed. This will help you recover and help prevent future problems. How can you care for yourself at home? · Rest until you feel better. · Take your medicine exactly as prescribed. Call your doctor if you think you are having a problem with your medicine. · Do not drive after taking a prescription pain medicine. When should you call for help? Call 911 if:    · You passed out (lost consciousness).     · You have severe difficulty breathing.     · You have symptoms of a heart attack. These may include:  ? Chest pain or pressure, or a strange feeling in your chest.  ? Sweating. ? Shortness of breath. ? Nausea or vomiting. ? Pain, pressure, or a strange feeling in your back, neck, jaw, or upper belly or in one or both shoulders or arms. ? Lightheadedness or sudden weakness. ? A fast or irregular heartbeat. After you call 911, the  may tell you to chew 1 adult-strength or 2 to 4 low-dose aspirin. Wait for an ambulance. Do not try to drive yourself.    Call your doctor today if:    · You have any trouble breathing.     · Your chest pain gets worse.     · You are dizzy or lightheaded, or you feel like you may faint.     · You are not getting better as expected.     · You are having new or different chest pain. Where can you learn more? Go to http://ladarius-pavel.info/. Enter A120 in the search box to learn more about \"Chest Pain: Care Instructions. \"  Current as of: June 26, 2019  Content Version: 12.2  © 0729-6296 [employer].  Care instructions adapted under license by [employer] (which disclaims liability or warranty for this information). If you have questions about a medical condition or this instruction, always ask your healthcare professional. [address] any warranty or liability for your use of this information.

## 2019-09-25 ENCOUNTER — OFFICE VISIT (OUTPATIENT)
Dept: NEUROLOGY | Age: 39
End: 2019-09-25

## 2019-09-25 ENCOUNTER — TELEPHONE (OUTPATIENT)
Dept: ENDOCRINOLOGY | Age: 39
End: 2019-09-25

## 2019-09-25 VITALS
BODY MASS INDEX: 37.33 KG/M2 | HEIGHT: 69 IN | WEIGHT: 252 LBS | OXYGEN SATURATION: 98 % | DIASTOLIC BLOOD PRESSURE: 75 MMHG | HEART RATE: 87 BPM | SYSTOLIC BLOOD PRESSURE: 125 MMHG

## 2019-09-25 DIAGNOSIS — G43.719 INTRACTABLE CHRONIC MIGRAINE WITHOUT AURA AND WITHOUT STATUS MIGRAINOSUS: ICD-10-CM

## 2019-09-25 DIAGNOSIS — G44.86 CERVICOGENIC HEADACHE: ICD-10-CM

## 2019-09-25 DIAGNOSIS — G43.009 MIGRAINE WITHOUT AURA AND WITHOUT STATUS MIGRAINOSUS, NOT INTRACTABLE: Primary | ICD-10-CM

## 2019-09-25 DIAGNOSIS — G47.33 OBSTRUCTIVE SLEEP APNEA: ICD-10-CM

## 2019-09-25 NOTE — PROGRESS NOTES
NEUROLOGY FOLLOW UP NOTE       Chief Complaint   Patient presents with    Follow-up     migraine, vision flashes, facial numbness,dizzy, balance       SUBJECTIVE:    Lubna Waite is a 35 y.o. male who presented to the neurology office for management of headaches. He has been having headaches for a long time but has been having worsening headaches since 2012. He does have close to 20 headache days in a month. He has 2 kinds of headache, one but starts of the right occipital area which is a sharp headache that shoots to the right frontal.  He does also have headaches in the left side as well. His headaches can also be pounding and throbbing and the headache severity is between 3-10 out of 10. He does have associated photophobia, phonophobia and osmophobia. Each headache can last for 4-5 days. Interval Hx:  His headaches got better during summers and beginning June/July his headaches came back. He is having a constant headache. Headache character is the same as in the past.     Baseline headache frequency: 20/month  Headache frequency now:     Risk Factors for headaches  Smoking: Denies denies  Coffee: Denies cups/day  Tea: Denies cups/day  Soda: 6 cans/day  Water: 1-2 glasses/day  Sleeps at 8-11 p.m. and wakes up at 5:30 aM. He does snore. He has been diagnosed with obstructive sleep apnea but did not get the CPAP machine because he lost his insurance. Medications tried  Preventative therapy:  Topamax  Metoprolol  Amitriptyline    Abortive therapy   Fioricet    Current Outpatient Medications   Medication Sig    galcanezumab-gnlm (EMGALITY PEN) 120 mg/mL injection 1 mL by SubCUTAneous route every thirty (30) days.  galcanezumab-gnlm (EMGALITY PEN) 120 mg/mL injection 2 mL by SubCUTAneous route once for 1 dose.  cyclobenzaprine (FLEXERIL) 10 mg tablet Take 10 mg by mouth three (3) times daily as needed for Muscle Spasm(s).     metoprolol succinate (TOPROL XL) 25 mg XL tablet Take 1 Tab by mouth daily.    LORazepam (ATIVAN) 0.5 mg tablet Take 1 Tab by mouth every eight (8) hours as needed for Anxiety. Max Daily Amount: 1.5 mg.    butalbital-acetaminophen-caffeine (FIORICET, ESGIC) -40 mg per tablet TAKE 1 TABLET BY MOUTH AT THE ONSET OF HEADACHE. CAN TAKE TWICE DAILY BUT NOT MORE THAN 10 DAYS/MONTH.ONLY TO BE FILLED IN 1 MONTH INTERVALS    busPIRone (BUSPAR) 15 mg tablet Take 1 Tab by mouth three (3) times daily. (Patient taking differently: Take 15 mg by mouth two (2) times a day.)    sertraline (ZOLOFT) 100 mg tablet Take 1 Tab by mouth daily.  metFORMIN (GLUCOPHAGE) 500 mg tablet TAKE 1 TABLET BY MOUTH TWICE DAILY WITH MEALS    insulin NPH (NOVOLIN N NPH U-100 INSULIN) 100 unit/mL injection by SubCUTAneous route once. 80 units twice a day    lisinopril (PRINIVIL, ZESTRIL) 10 mg tablet TAKE 1 TABLET BY MOUTH ONCE DAILY    levothyroxine (SYNTHROID) 112 mcg tablet TAKE 1 TABLET BY MOUTH ONCE DAILY BEFORE BREAKFAST    fenofibrate (LOFIBRA) 160 mg tablet TAKE 1 TABLET BY MOUTH ONCE DAILY    rosuvastatin (CRESTOR) 20 mg tablet Take 1 Tab by mouth nightly.  insulin regular (NOVOLIN R, HUMULIN R) 100 unit/mL injection 15-20 units TID AC (Patient taking differently: 10 units 2-3 times per day  Indications: 0-20u BID, per pt)    aspirin delayed-release 81 mg tablet Take 1 Tab by mouth daily. No current facility-administered medications for this visit.       Allergies   Allergen Reactions    Morphine Nausea and Vomiting    Penicillin G Swelling    Percocet [Oxycodone-Acetaminophen] Hives and Nausea and Vomiting    Sulfa (Sulfonamide Antibiotics) Swelling    Tramadol Nausea Only     Nausea and headache       Past Medical History:   Diagnosis Date    Chronic headaches 2007    Depression     Diabetes ([facility] [address]     GERD (gastroesophageal reflux disease)     Hypertension     Thyroid disease     hypothyroid    Unspecified sleep apnea     does not use CPAP     Past Surgical History: Procedure Laterality Date    HX CHOLECYSTECTOMY  14    Dr Maverick Coyle    HX HEENT      wisdom teeth removed    HX ORTHOPAEDIC Left 2012    carpel tunnel     Family History   Problem Relation Age of Onset    Diabetes Mother     Heart Disease Father     Cancer Father     Diabetes Father     Diabetes Paternal Aunt     Diabetes Maternal Grandmother     Thyroid Cancer Maternal Grandmother     Kidney Disease Maternal Grandmother     Arthritis Maternal Grandmother     Heart Disease Maternal Grandfather     High Cholesterol Maternal Grandfather     Hypertension Maternal Grandfather     Diabetes Paternal Grandmother     Hemophilia Paternal Grandfather      Social History     Tobacco Use    Smoking status: Former Smoker     Packs/day: 0.00     Years: 14.00     Pack years: 0.00     Last attempt to quit: 2014     Years since quittin.7    Smokeless tobacco: Never Used   Substance Use Topics    Alcohol use: No    Drug use: No       REVIEW OF SYSTEMS:   A ten system review of constitutional, cardiovascular, respiratory, musculoskeletal, endocrine, skin, SHEENT, genitourinary, psychiatric and neurologic systems was obtained and is unremarkable with the exception of the following: Anxiety, depression, fatigue, headaches, hearing loss, joint pain, skipped beats, snoring  EXAMINATION:   Visit Vitals  /75   Pulse 87   Ht 5' 9\" (1.753 m)   Wt 252 lb (114.3 kg)   SpO2 98%   BMI 37.21 kg/m²        General:   General appearance: Pt is in no acute distress   Distal pulses are preserved  Fundoscopic Exam: Normal    Neurological Examination:   Mental Status: AAO x3. Speech is fluent. Follows commands, has normal fund of knowledge, attention, short term recall, comprehension and insight. Cranial Nerves: Visual fields are full. PERRL, Extraocular movements are full. Facial sensation intact V1- V3. Facial movement intact, symmetric. Hearing intact to conversation. Palate elevates symmetrically.  Shoulder shrug symmetric. Tongue midline. Motor: Strength is 5/5 in all 4 ext. No atrophy. Tone: Normal    Sensation: Normal to light touch    Reflexes: DTRs 2+ throughout. Plantar responses downgoing. Coordination/Cerebellar: Intact to finger-nose-finger     Gait: Romberg is negative and casual gait is normal.     Laboratory review:   Results for orders placed or performed during the hospital encounter of 09/24/19   CBC WITH AUTOMATED DIFF   Result Value Ref Range    WBC 8.5 4.1 - 11.1 K/uL    RBC 4.72 4.10 - 5.70 M/uL    HGB 13.3 12.1 - 17.0 g/dL    HCT 40.3 36.6 - 50.3 %    MCV 85.4 80.0 - 99.0 FL    MCH 28.2 26.0 - 34.0 PG    MCHC 33.0 30.0 - 36.5 g/dL    RDW 13.3 11.5 - 14.5 %    PLATELET 934 489 - 888 K/uL    MPV 10.7 8.9 - 12.9 FL    NRBC 0.0 0  WBC    ABSOLUTE NRBC 0.00 0.00 - 0.01 K/uL    NEUTROPHILS 52 32 - 75 %    LYMPHOCYTES 37 12 - 49 %    MONOCYTES 6 5 - 13 %    EOSINOPHILS 3 0 - 7 %    BASOPHILS 1 0 - 1 %    IMMATURE GRANULOCYTES 1 (H) 0.0 - 0.5 %    ABS. NEUTROPHILS 4.4 1.8 - 8.0 K/UL    ABS. LYMPHOCYTES 3.2 0.8 - 3.5 K/UL    ABS. MONOCYTES 0.5 0.0 - 1.0 K/UL    ABS. EOSINOPHILS 0.2 0.0 - 0.4 K/UL    ABS. BASOPHILS 0.1 0.0 - 0.1 K/UL    ABS. IMM. GRANS. 0.1 (H) 0.00 - 0.04 K/UL    DF AUTOMATED     METABOLIC PANEL, COMPREHENSIVE   Result Value Ref Range    Sodium 136 136 - 145 mmol/L    Potassium 4.0 3.5 - 5.1 mmol/L    Chloride 103 97 - 108 mmol/L    CO2 28 21 - 32 mmol/L    Anion gap 5 5 - 15 mmol/L    Glucose 243 (H) 65 - 100 mg/dL    BUN 16 6 - 20 MG/DL    Creatinine 1.27 0.70 - 1.30 MG/DL    BUN/Creatinine ratio 13 12 - 20      GFR est AA >60 >60 ml/min/1.73m2    GFR est non-AA >60 >60 ml/min/1.73m2    Calcium 8.8 8.5 - 10.1 MG/DL    Bilirubin, total 0.5 0.2 - 1.0 MG/DL    ALT (SGPT) 39 12 - 78 U/L    AST (SGOT) 22 15 - 37 U/L    Alk.  phosphatase 70 45 - 117 U/L    Protein, total 7.6 6.4 - 8.2 g/dL    Albumin 3.8 3.5 - 5.0 g/dL    Globulin 3.8 2.0 - 4.0 g/dL    A-G Ratio 1.0 (L) 1.1 - 2. 2     TROPONIN I   Result Value Ref Range    Troponin-I, Qt. <0.05 <0.05 ng/mL   TROPONIN I   Result Value Ref Range    Troponin-I, Qt. <0.05 <0.05 ng/mL   EKG, 12 LEAD, INITIAL   Result Value Ref Range    Ventricular Rate 89 BPM    Atrial Rate 89 BPM    P-R Interval 154 ms    QRS Duration 88 ms    Q-T Interval 340 ms    QTC Calculation (Bezet) 413 ms    Calculated P Axis 43 degrees    Calculated R Axis 18 degrees    Calculated T Axis 32 degrees    Diagnosis       Normal sinus rhythm  Normal ECG  Confirmed by Matt Rivera ([id number]) on 2019 7:21:25 PM         [id number]  CT scan of the head showed no acute process. Laboratory review:  10/17/2016  CBC normal. CMP showed sodium of 132 and glucose 377    Documentation review:  The patient was seen in the emergency room on 10/17/2016 because of gradually worsening chronic back pain for 2 days. The patient woke up and could not move. He does have a diagnosis of fibromyalgia. The patient was also complaining of neck pain radiating to the frontal area. The patient did get x-ray of the left knee and right knee which were normal.  The patient was given Norco, Compazine, Benadryl, Toradol and Valium the patient was discharged home on Norco one tablet every 4 hours as needed and Valium 5 mg every 8 times as needed. Assessment/Plan:   Kimberlyn Escalona is a 35 y.o. male who presented to the neurology office for management of headaches. His examination is normal and there was no evidence of papilledema. He did also get recent CT scan of the head which was normal.  He does have both chronic migraines without aura and right cervicogenic headache.     - Right greater and lesser occipital nerve block to be scheduled  -We will start the patient on Emgality. Emgality administered in office with no complications. Follow-up in 6 months      Thank you for allowing me to participate in the care of Mr. Ryan Barbour.  Please feel free to contact me if you have any questions. Maia Spence MD  Neurologist and Clinical Neurophysiologist    CC: Trinh Heller MD  Fax: [phone]    This note will not be viewable in [address]

## 2019-09-25 NOTE — LETTER
9/25/19 Patient: Dung Appiah YOB: 1980 Date of Visit: 9/25/2019 Trinh Heller MD 
[address] [facility] [address] [place] [address] VIA In Basket Dear Qing Long MD, Thank you for referring Mr. Teri Restrepo to [address] [place] [address] for evaluation. My notes for this consultation are attached. If you have questions, please do not hesitate to call me. I look forward to following your patient along with you. Sincerely, Maia Spence MD

## 2019-09-25 NOTE — PATIENT INSTRUCTIONS
Office Policies  · Phone calls/patient messages:  Please allow up to 24 hours for someone in the office to contact you about your call or message. Be mindful your provider may be out of the office or your message may require further review. We encourage you to use [employer] for your messages as this is a faster, more efficient way to communicate with our office  · Medication Refills:  Prescription medications require up to 48 business hours to process. We encourage you to use [employer] for your refills. For controlled medications: Please allow up to 72 business hours to process. Certain medications may require you to  a written prescription at our office. NO narcotic/controlled medications will be prescribed after 4pm Monday through Friday or on weekends  · Form/Paperwork Completion:  Please note there is a $25 fee for all paperwork completed by our providers. We ask that you allow 7-14 business days. Pre-payment is due prior to picking up/faxing the completed form. You may also download your forms to [employer] to have your doctor print off.

## 2019-09-25 NOTE — TELEPHONE ENCOUNTER
LEFTY: Spoke with patient. He said that he saw neuro, Patrisha Cancer today. He received 2 shots today for migraines. He will get these every month. He states that he has an appt on 10/25/19 at 10:20 am for a Botox injection and on 01/22/20 at 3 pm for a follow up appointment. Patient states that he was advised to see an ortho for a bulging disc in his neck. He has an appt 10/23/19 at 9:30 am with , ortho. He states that he has called Dr.Stanley Deluca's office for an appt to follow up on his heart. That office will call him back to schedule the appointment. Patient states he wanted to keep  informed.

## 2019-09-26 ENCOUNTER — TELEPHONE (OUTPATIENT)
Dept: ENDOCRINOLOGY | Age: 39
End: 2019-09-26

## 2019-09-26 ENCOUNTER — DOCUMENTATION ONLY (OUTPATIENT)
Dept: ENDOCRINOLOGY | Age: 39
End: 2019-09-26

## 2019-09-26 NOTE — TELEPHONE ENCOUNTER
9/26/2019  2:35 PM        Mrs. Cervantes calling on behalf of her son Shyanne Wellington would like for you to give her a call back regarding his headaches.

## 2019-09-26 NOTE — TELEPHONE ENCOUNTER
LEFTY: spoke with patient. He states that he awoke with a headache. After eating, about an hour and a half later, he started drifting to the right when walking. He said that the drifting to the right lasted about 5 minutes. He states that when he was drifting to the right, the right side of his face went numb. He states when he gets the headaches, it usually starts after getting a shooting pain in his neck, then it travels up to the upper right  portion of his head. He wanted to let  know for documentation of his symptoms.

## 2019-10-10 ENCOUNTER — HOSPITAL ENCOUNTER (EMERGENCY)
Age: 39
Discharge: HOME | End: 2019-10-11
Attending: EMERGENCY MEDICINE
Payer: COMMERCIAL

## 2019-10-10 ENCOUNTER — APPOINTMENT (OUTPATIENT)
Dept: GENERAL RADIOLOGY | Age: 39
End: 2019-10-10
Attending: PHYSICIAN ASSISTANT
Payer: COMMERCIAL

## 2019-10-10 VITALS
BODY MASS INDEX: 37.36 KG/M2 | WEIGHT: 252.21 LBS | HEIGHT: 69 IN | SYSTOLIC BLOOD PRESSURE: 173 MMHG | DIASTOLIC BLOOD PRESSURE: 86 MMHG | HEART RATE: 98 BPM | RESPIRATION RATE: 20 BRPM | TEMPERATURE: 98.1 F | OXYGEN SATURATION: 100 %

## 2019-10-10 DIAGNOSIS — G43.819 OTHER MIGRAINE WITHOUT STATUS MIGRAINOSUS, INTRACTABLE: ICD-10-CM

## 2019-10-10 DIAGNOSIS — M54.2 CERVICALGIA: Primary | ICD-10-CM

## 2019-10-10 PROCEDURE — 96374 THER/PROPH/DIAG INJ IV PUSH: CPT

## 2019-10-10 PROCEDURE — 74011250637 HC RX REV CODE- 250/637: Performed by: PHYSICIAN ASSISTANT

## 2019-10-10 PROCEDURE — 99283 EMERGENCY DEPT VISIT LOW MDM: CPT

## 2019-10-10 PROCEDURE — 72050 X-RAY EXAM NECK SPINE 4/5VWS: CPT

## 2019-10-10 PROCEDURE — 74011250636 HC RX REV CODE- 250/636: Performed by: PHYSICIAN ASSISTANT

## 2019-10-10 RX ORDER — NAPROXEN 500 MG/1
500 TABLET ORAL 2 TIMES DAILY WITH MEALS
Qty: 20 TAB | Refills: 0 | Status: SHIPPED | OUTPATIENT
Start: 2019-10-10 | End: 2019-10-20

## 2019-10-10 RX ORDER — FENTANYL CITRATE 50 UG/ML
100 INJECTION, SOLUTION INTRAMUSCULAR; INTRAVENOUS
Status: COMPLETED | OUTPATIENT
Start: 2019-10-10 | End: 2019-10-10

## 2019-10-10 RX ORDER — ONDANSETRON 4 MG/1
4 TABLET, ORALLY DISINTEGRATING ORAL
Qty: 10 TAB | Refills: 0 | Status: SHIPPED | OUTPATIENT
Start: 2019-10-10 | End: 2019-12-03

## 2019-10-10 RX ORDER — ONDANSETRON 4 MG/1
4 TABLET, ORALLY DISINTEGRATING ORAL
Status: COMPLETED | OUTPATIENT
Start: 2019-10-10 | End: 2019-10-10

## 2019-10-10 RX ORDER — HYDROCODONE BITARTRATE AND ACETAMINOPHEN 5; 325 MG/1; MG/1
1 TABLET ORAL
Qty: 9 TAB | Refills: 0 | Status: SHIPPED | OUTPATIENT
Start: 2019-10-10 | End: 2019-10-13

## 2019-10-10 RX ADMIN — FENTANYL CITRATE 100 MCG: 50 INJECTION INTRAMUSCULAR; INTRAVENOUS at 23:55

## 2019-10-10 RX ADMIN — ONDANSETRON 4 MG: 4 TABLET, ORALLY DISINTEGRATING ORAL at 23:54

## 2019-10-11 NOTE — ED PROVIDER NOTES
EMERGENCY DEPARTMENT HISTORY AND PHYSICAL EXAM      Date: 10/10/2019  Patient Name: Dolores Hernandez    History of Presenting Illness     Chief Complaint   Patient presents with    Neck Pain     Patient states he has a bulging disc and now the pain is in his shoulder and arm       History Provided By: Patient    HPI: Dolores Hernandez, 35 y.o. male presents ambulatory with his mother to the ED with cc of chronic but acutely worse this evening of 10 out of 10 constant, aching neck pain that is worse with movement and not associated with fever or injury. He tells me his neck pain is causing his chronic migraine condition to worsen and though he is taking Fioricet, he has had no relief of his symptoms. He tells me he is currently working with neurology for his chronic migraines and, regarding his neck, had an MRI under the guidance of Ortho/Spine. There are no other complaints, changes, or physical findings at this time. PCP: Mercy Heller MD    Current Outpatient Medications   Medication Sig Dispense Refill    HYDROcodone-acetaminophen (LORTAB 5-325) 5-325 mg per tablet Take 1 Tab by mouth every eight (8) hours as needed for Pain for up to 3 days. Max Daily Amount: 3 Tabs. 9 Tab 0    naproxen (NAPROSYN) 500 mg tablet Take 1 Tab by mouth two (2) times daily (with meals) for 10 days. 20 Tab 0    ondansetron (ZOFRAN ODT) 4 mg disintegrating tablet Take 1 Tab by mouth every eight (8) hours as needed for Nausea. 10 Tab 0    galcanezumab-gnlm (EMGALITY PEN) 120 mg/mL injection 1 mL by SubCUTAneous route every thirty (30) days. 1 mL 11    cyclobenzaprine (FLEXERIL) 10 mg tablet Take 10 mg by mouth three (3) times daily as needed for Muscle Spasm(s).  aspirin delayed-release 81 mg tablet Take 1 Tab by mouth daily. 30 Tab 0    metoprolol succinate (TOPROL XL) 25 mg XL tablet Take 1 Tab by mouth daily.  30 Tab 0    LORazepam (ATIVAN) 0.5 mg tablet Take 1 Tab by mouth every eight (8) hours as needed for Anxiety. Max Daily Amount: 1.5 mg. 20 Tab 0    butalbital-acetaminophen-caffeine (FIORICET, ESGIC) -40 mg per tablet TAKE 1 TABLET BY MOUTH AT THE ONSET OF HEADACHE. CAN TAKE TWICE DAILY BUT NOT MORE THAN 10 DAYS/MONTH.ONLY TO BE FILLED IN 1 MONTH INTERVALS 20 Tab 2    busPIRone (BUSPAR) 15 mg tablet Take 1 Tab by mouth three (3) times daily. (Patient taking differently: Take 15 mg by mouth two (2) times a day.) 90 Tab 3    sertraline (ZOLOFT) 100 mg tablet Take 1 Tab by mouth daily.  metFORMIN (GLUCOPHAGE) 500 mg tablet TAKE 1 TABLET BY MOUTH TWICE DAILY WITH MEALS 180 Tab 3    insulin NPH (NOVOLIN N NPH U-100 INSULIN) 100 unit/mL injection by SubCUTAneous route once. 80 units twice a day      lisinopril (PRINIVIL, ZESTRIL) 10 mg tablet TAKE 1 TABLET BY MOUTH ONCE DAILY 90 Tab 3    levothyroxine (SYNTHROID) 112 mcg tablet TAKE 1 TABLET BY MOUTH ONCE DAILY BEFORE BREAKFAST 90 Tab 3    fenofibrate (LOFIBRA) 160 mg tablet TAKE 1 TABLET BY MOUTH ONCE DAILY 90 Tab 3    rosuvastatin (CRESTOR) 20 mg tablet Take 1 Tab by mouth nightly.  90 Tab 3    insulin regular (NOVOLIN R, HUMULIN R) 100 unit/mL injection 15-20 units TID AC (Patient taking differently: 10 units 2-3 times per day  Indications: 0-20u BID, per pt) 1 Vial 12     Past History     Past Medical History:  Past Medical History:   Diagnosis Date    Chronic headaches 2007    Depression     Diabetes ([address]     GERD (gastroesophageal reflux disease)     Hypertension     Thyroid disease     hypothyroid    Unspecified sleep apnea     does not use CPAP       Past Surgical History:  Past Surgical History:   Procedure Laterality Date    HX CHOLECYSTECTOMY  8/26/14    Dr Katya Johnson    HX HEENT      wisdom teeth removed    HX ORTHOPAEDIC Left 2012    carpel tunnel       Family History:  Family History   Problem Relation Age of Onset    Diabetes Mother     Heart Disease Father     Cancer Father     Diabetes Father     Diabetes Paternal Aunt     Diabetes Maternal Grandmother     Thyroid Cancer Maternal Grandmother     Kidney Disease Maternal Grandmother     Arthritis Maternal Grandmother     Heart Disease Maternal Grandfather     High Cholesterol Maternal Grandfather     Hypertension Maternal Grandfather     Diabetes Paternal Grandmother     Hemophilia Paternal Grandfather        Social History:  Social History     Tobacco Use    Smoking status: Former Smoker     Packs/day: 0.00     Years: 14.00     Pack years: 0.00     Last attempt to quit: 2014     Years since quittin.7    Smokeless tobacco: Never Used   Substance Use Topics    Alcohol use: No    Drug use: No       Allergies: Allergies   Allergen Reactions    Morphine Nausea and Vomiting    Penicillin G Swelling    Percocet [Oxycodone-Acetaminophen] Hives and Nausea and Vomiting    Sulfa (Sulfonamide Antibiotics) Swelling    Tramadol Nausea Only     Nausea and headache       Review of Systems   Review of Systems   Constitutional: Negative for fatigue and fever. HENT: Negative for congestion, ear pain and rhinorrhea. Eyes: Negative for pain and redness. Respiratory: Negative for cough and wheezing. Cardiovascular: Negative for chest pain and palpitations. Gastrointestinal: Negative for abdominal pain, nausea and vomiting. Genitourinary: Negative for dysuria, frequency and urgency. Musculoskeletal: Positive for neck pain. Negative for back pain and neck stiffness. Skin: Negative for rash and wound. Neurological: Positive for headaches. Negative for weakness, light-headedness and numbness. Physical Exam   Physical Exam   Constitutional: He is oriented to person, place, and time. He appears well-developed and well-nourished. Non-toxic appearance. No distress. HENT:   Head: Normocephalic and atraumatic. Head is without right periorbital erythema and without left periorbital erythema.    Right Ear: External ear normal.   Left Ear: External ear normal.   Nose: Nose normal.   Mouth/Throat: Uvula is midline. No trismus in the jaw. Eyes: Pupils are equal, round, and reactive to light. Conjunctivae and EOM are normal. No scleral icterus. Neck: Normal range of motion and full passive range of motion without pain. No spinous process tenderness and no muscular tenderness present. CERVICAL SPINE:  No bruising, redness or swelling  Full active range of motion of all extremities  Diffuse soreness   Cardiovascular: Normal rate, regular rhythm and normal heart sounds. Pulmonary/Chest: Effort normal and breath sounds normal. No accessory muscle usage. No tachypnea. No respiratory distress. He has no decreased breath sounds. He has no wheezes. Abdominal: Soft. There is no tenderness. There is no rigidity and no guarding. Musculoskeletal: Normal range of motion. Neurological: He is alert and oriented to person, place, and time. He is not disoriented. No cranial nerve deficit or sensory deficit. GCS eye subscore is 4. GCS verbal subscore is 5. GCS motor subscore is 6. No focal neurological deficits   Skin: Skin is intact. No rash noted. Psychiatric: He has a normal mood and affect. His speech is normal.   Nursing note and vitals reviewed. Diagnostic Study Results     Labs -   No results found for this or any previous visit (from the past 12 hour(s)). Radiologic Studies -   XR SPINE CERV 4 OR 5 V   Final Result   IMPRESSION: No acute fracture or subluxation. CT Results  (Last 48 hours)    None        CXR Results  (Last 48 hours)    None        Medical Decision Making   I am the first provider for this patient. I reviewed the vital signs, available nursing notes, past medical history, past surgical history, family history and social history. Vital Signs-Reviewed the patient's vital signs.   Patient Vitals for the past 12 hrs:   Temp Pulse Resp BP SpO2   10/10/19 1930 98.1 °F (36.7 °C) 98 20 173/86 100 %       Pulse Oximetry Analysis - 100% on RA    Records Reviewed: Nursing Notes, Old Medical Records, Previous Radiology Studies, Previous Laboratory Studies and  and KARLEE    Provider Notes (Medical Decision Making):   DDx: HNP, cervical DDD, chronic migraine    Afebrile; appears uncomfortable but otherwise no distress; plain films of the cervical spine are negative for acute process; regarding his headache, there are are no focal neurological deficits and this is a chronic problem; will offer pain medication in the emergency department and refer to neurology and Ortho    ED Course:   Initial assessment performed. The patients presenting problems have been discussed, and they are in agreement with the care plan formulated and outlined with them. I have encouraged them to ask questions as they arise throughout their visit. Disposition:  Discharge    PLAN:  1. Current Discharge Medication List      START taking these medications    Details   HYDROcodone-acetaminophen (LORTAB 5-325) 5-325 mg per tablet Take 1 Tab by mouth every eight (8) hours as needed for Pain for up to 3 days. Max Daily Amount: 3 Tabs. Qty: 9 Tab, Refills: 0    Associated Diagnoses: Cervicalgia; Other migraine without status migrainosus, intractable      naproxen (NAPROSYN) 500 mg tablet Take 1 Tab by mouth two (2) times daily (with meals) for 10 days. Qty: 20 Tab, Refills: 0      ondansetron (ZOFRAN ODT) 4 mg disintegrating tablet Take 1 Tab by mouth every eight (8) hours as needed for Nausea. Qty: 10 Tab, Refills: 0           2.    Follow-up Information     Follow up With Specialties Details Amandeep Newberry MD Neurology Schedule an appointment as soon as possible for a visit NEUROLOGY: call to schedule follow up [id number]      Tawnya Lombardo MD Orthopedic Surgery Schedule an appointment as soon as possible for a visit ORTHO / SPINE: call to schedule follow up [phone]  [facility]  [phone]          Return to ED if worse     Diagnosis     Clinical Impression:   1. Cervicalgia    2.  Other migraine without status migrainosus, intractable

## 2019-10-11 NOTE — ED NOTES
Attempted to call the patient back to a room with no response. Will try again when another room becomes available.

## 2019-10-11 NOTE — ED NOTES
Patient was provided with discharge instructions. Instructions and any medications were reviewed with the patient &/or family by the provider. Questions and concerns addressed by the provider. Patient was ambulatory out of the ED and was escorted by his mother.

## 2019-10-17 ENCOUNTER — OFFICE VISIT (OUTPATIENT)
Dept: FAMILY MEDICINE CLINIC | Age: 39
End: 2019-10-17

## 2019-10-17 VITALS
DIASTOLIC BLOOD PRESSURE: 81 MMHG | TEMPERATURE: 97.2 F | HEIGHT: 69 IN | RESPIRATION RATE: 20 BRPM | OXYGEN SATURATION: 98 % | WEIGHT: 255 LBS | SYSTOLIC BLOOD PRESSURE: 144 MMHG | BODY MASS INDEX: 37.77 KG/M2 | HEART RATE: 95 BPM

## 2019-10-17 DIAGNOSIS — F41.8 DEPRESSION WITH ANXIETY: ICD-10-CM

## 2019-10-17 DIAGNOSIS — Z00.00 ENCOUNTER FOR MEDICAL EXAMINATION TO ESTABLISH CARE: Primary | ICD-10-CM

## 2019-10-17 DIAGNOSIS — E55.9 VITAMIN D DEFICIENCY: ICD-10-CM

## 2019-10-17 DIAGNOSIS — R35.0 FREQUENCY OF URINATION: ICD-10-CM

## 2019-10-17 DIAGNOSIS — Z79.4 TYPE 2 DIABETES MELLITUS WITH COMPLICATION, WITH LONG-TERM CURRENT USE OF INSULIN (HCC): ICD-10-CM

## 2019-10-17 DIAGNOSIS — E11.8 TYPE 2 DIABETES MELLITUS WITH COMPLICATION, WITH LONG-TERM CURRENT USE OF INSULIN (HCC): ICD-10-CM

## 2019-10-17 DIAGNOSIS — Z23 ENCOUNTER FOR IMMUNIZATION: ICD-10-CM

## 2019-10-17 DIAGNOSIS — E78.1 HYPERTRIGLYCERIDEMIA: ICD-10-CM

## 2019-10-17 DIAGNOSIS — I10 HYPERTENSION GOAL BP (BLOOD PRESSURE) < 130/80: ICD-10-CM

## 2019-10-17 DIAGNOSIS — E03.9 ACQUIRED HYPOTHYROIDISM: ICD-10-CM

## 2019-10-17 RX ORDER — LORAZEPAM 0.5 MG/1
0.5 TABLET ORAL
Qty: 20 TAB | Refills: 0 | Status: SHIPPED | OUTPATIENT
Start: 2019-10-17 | End: 2019-10-17 | Stop reason: CLARIF

## 2019-10-17 RX ORDER — LORAZEPAM 0.5 MG/1
0.5 TABLET ORAL
Qty: 30 TAB | Refills: 0 | Status: SHIPPED | OUTPATIENT
Start: 2019-10-17 | End: 2019-11-27 | Stop reason: SDUPTHER

## 2019-10-17 RX ORDER — LISINOPRIL 10 MG/1
TABLET ORAL
Qty: 90 TAB | Refills: 3 | Status: ON HOLD | OUTPATIENT
Start: 2019-10-17 | End: 2019-12-03 | Stop reason: SDUPTHER

## 2019-10-17 RX ORDER — CHLORTHALIDONE 25 MG/1
25 TABLET ORAL DAILY
Qty: 90 TAB | Refills: 1 | Status: SHIPPED | OUTPATIENT
Start: 2019-10-17 | End: 2020-05-22

## 2019-10-17 NOTE — PROGRESS NOTES
Chief Complaint   Patient presents with    New Patient     Patient is new with anxiety and depression. Having daily headaches and see neuro. Have appt on 10/23/2019 for bulging disk  at [facility].

## 2019-10-17 NOTE — PROGRESS NOTES
Chief Complaint   Patient presents with    New Patient    Dizziness     LIGHTHEADED     HPI:  Cathie Alford is a 35 y.o.  male with h/o diabetes, hypertension, hypercholesterolemia, hypothyroidism, migraine headaches, depression and anxiety presents to establish care. .  Patient has been frequenting ER with headaches and neck pain found to be due to bulging disc in neck. He has appointment to see a neurosurgeon next week. Most recent lab results indicate poorly controlled diabetes with A1C of 10% on insulin and metformin. He follow Dr. Alejandrina Galloway for diabetes and hypothyroidism. Depression and anxiety was managed by previous pcp. Migraine headaches is treated by a neurologist. Reason for this visit is to meet and greet pcp.   Previous PCP: Dr. Valeria Heller    Review of Systems  Constitutional: negative for fevers/chills  Eyes:   negative for visual disturbance   Respiratory:  negative for cough, dyspnea,wheezing  CV:   negative for chest pain, lower extremity edema  GI:   negative for abdominal pain  Endo:             negative for polyuria,polydipsia,polyphagia,heat intolerance  Genitourinary: negative for frequency, dysuria  Integument:  negative for rash and pruritus  Musculoskel: negative for myalgias, arthralgias, back pain, joint pain  Neurological:  negative for headaches, dizziness and gait   Behavl/Psych: negative for feelings of anxiety, depression, mood changes    Past Medical History:   Diagnosis Date    Anxiety     Chronic headaches 2007    Depression     Diabetes ([address]     GERD (gastroesophageal reflux disease)     Hypercholesterolemia     Hypertension     Thyroid disease     hypothyroid    Unspecified sleep apnea     does not use CPAP     Past Surgical History:   Procedure Laterality Date    HX CHOLECYSTECTOMY  8/26/14    Dr Watt Hand    HX HEENT      wisdom teeth removed    HX ORTHOPAEDIC Left 2012    carpel tunnel     Social History     Socioeconomic History    Marital status:      Spouse name: Not on file    Number of children: Not on file    Years of education: Not on file    Highest education level: Not on file   Tobacco Use    Smoking status: Former Smoker     Packs/day: 0.00     Years: 14.00     Pack years: 0.00     Last attempt to quit: 2014     Years since quittin.7    Smokeless tobacco: Never Used   Substance and Sexual Activity    Alcohol use: No    Drug use: No    Sexual activity: Not Currently     Family History   Problem Relation Age of Onset    Diabetes Mother     Heart Disease Father     Cancer Father     Diabetes Father     Diabetes Paternal Aunt     Diabetes Maternal Grandmother     Thyroid Cancer Maternal Grandmother     Kidney Disease Maternal Grandmother     Arthritis Maternal Grandmother     Heart Disease Maternal Grandfather     High Cholesterol Maternal Grandfather     Hypertension Maternal Grandfather     Diabetes Paternal Grandmother     Hemophilia Paternal Grandfather      Current Outpatient Medications   Medication Sig Dispense Refill    naproxen (NAPROSYN) 500 mg tablet Take 1 Tab by mouth two (2) times daily (with meals) for 10 days. 20 Tab 0    ondansetron (ZOFRAN ODT) 4 mg disintegrating tablet Take 1 Tab by mouth every eight (8) hours as needed for Nausea. 10 Tab 0    cyclobenzaprine (FLEXERIL) 10 mg tablet Take 10 mg by mouth three (3) times daily as needed for Muscle Spasm(s).  busPIRone (BUSPAR) 15 mg tablet Take 1 Tab by mouth three (3) times daily. (Patient taking differently: Take 15 mg by mouth two (2) times a day.) 90 Tab 3    sertraline (ZOLOFT) 100 mg tablet Take 1 Tab by mouth daily.  metFORMIN (GLUCOPHAGE) 500 mg tablet TAKE 1 TABLET BY MOUTH TWICE DAILY WITH MEALS 180 Tab 3    insulin NPH (NOVOLIN N NPH U-100 INSULIN) 100 unit/mL injection by SubCUTAneous route once.  80 units twice a day      lisinopril (PRINIVIL, ZESTRIL) 10 mg tablet TAKE 1 TABLET BY MOUTH ONCE DAILY 90 Tab 3    levothyroxine (SYNTHROID) 112 mcg tablet TAKE 1 TABLET BY MOUTH ONCE DAILY BEFORE BREAKFAST 90 Tab 3    fenofibrate (LOFIBRA) 160 mg tablet TAKE 1 TABLET BY MOUTH ONCE DAILY 90 Tab 3    rosuvastatin (CRESTOR) 20 mg tablet Take 1 Tab by mouth nightly. 90 Tab 3    galcanezumab-gnlm (EMGALITY PEN) 120 mg/mL injection 1 mL by SubCUTAneous route every thirty (30) days. 1 mL 11    aspirin delayed-release 81 mg tablet Take 1 Tab by mouth daily. 30 Tab 0    butalbital-acetaminophen-caffeine (FIORICET, ESGIC) -40 mg per tablet TAKE 1 TABLET BY MOUTH AT THE ONSET OF HEADACHE. CAN TAKE TWICE DAILY BUT NOT MORE THAN 10 DAYS/MONTH.ONLY TO BE FILLED IN 1 MONTH INTERVALS 20 Tab 2    insulin regular (NOVOLIN R, HUMULIN R) 100 unit/mL injection 15-20 units TID AC (Patient taking differently: 10 units 2-3 times per day  Indications: 0-20u BID, per pt) 1 Vial 12     Allergies   Allergen Reactions    Morphine Nausea and Vomiting    Penicillin G Swelling    Percocet [Oxycodone-Acetaminophen] Hives and Nausea and Vomiting    Sulfa (Sulfonamide Antibiotics) Swelling    Tramadol Nausea Only     Nausea and headache         Objective:  Visit Vitals  /81   Pulse 95   Temp 97.2 °F (36.2 °C) (Oral)   Resp 20   Ht 5' 9\" (1.753 m)   Wt 255 lb (115.7 kg)   SpO2 98%   BMI 37.66 kg/m²     Physical Exam:   General appearance - alert, well appearing in no distress  Mental status - alert, oriented to person, place, and time  EYE-PERRL, EOMI  ENT-ENT exam normal, no neck nodes or sinus tenderness  Mouth - mucous membranes moist, pharynx normal without lesions  Neck - supple, no significant adenopathy   Chest - clear to auscultation, no wheezes, rales or rhonchi  Heart - normal rate, regular rhythm, normal   Abdomen - soft, nontender, nondistended, no organomegaly  Ext-peripheral pulses normal, no pedal edema   Neuro -alert, oriented, normal speech, no focal findings   Back-full range of motion, no tenderness, palpable spasm or pain on motion       Results for orders placed or performed during the hospital encounter of 09/24/19   CBC WITH AUTOMATED DIFF   Result Value Ref Range    WBC 8.5 4.1 - 11.1 K/uL    RBC 4.72 4.10 - 5.70 M/uL    HGB 13.3 12.1 - 17.0 g/dL    HCT 40.3 36.6 - 50.3 %    MCV 85.4 80.0 - 99.0 FL    MCH 28.2 26.0 - 34.0 PG    MCHC 33.0 30.0 - 36.5 g/dL    RDW 13.3 11.5 - 14.5 %    PLATELET 402 405 - 562 K/uL    MPV 10.7 8.9 - 12.9 FL    NRBC 0.0 0  WBC    ABSOLUTE NRBC 0.00 0.00 - 0.01 K/uL    NEUTROPHILS 52 32 - 75 %    LYMPHOCYTES 37 12 - 49 %    MONOCYTES 6 5 - 13 %    EOSINOPHILS 3 0 - 7 %    BASOPHILS 1 0 - 1 %    IMMATURE GRANULOCYTES 1 (H) 0.0 - 0.5 %    ABS. NEUTROPHILS 4.4 1.8 - 8.0 K/UL    ABS. LYMPHOCYTES 3.2 0.8 - 3.5 K/UL    ABS. MONOCYTES 0.5 0.0 - 1.0 K/UL    ABS. EOSINOPHILS 0.2 0.0 - 0.4 K/UL    ABS. BASOPHILS 0.1 0.0 - 0.1 K/UL    ABS. IMM. GRANS. 0.1 (H) 0.00 - 0.04 K/UL    DF AUTOMATED     METABOLIC PANEL, COMPREHENSIVE   Result Value Ref Range    Sodium 136 136 - 145 mmol/L    Potassium 4.0 3.5 - 5.1 mmol/L    Chloride 103 97 - 108 mmol/L    CO2 28 21 - 32 mmol/L    Anion gap 5 5 - 15 mmol/L    Glucose 243 (H) 65 - 100 mg/dL    BUN 16 6 - 20 MG/DL    Creatinine 1.27 0.70 - 1.30 MG/DL    BUN/Creatinine ratio 13 12 - 20      GFR est AA >60 >60 ml/min/1.73m2    GFR est non-AA >60 >60 ml/min/1.73m2    Calcium 8.8 8.5 - 10.1 MG/DL    Bilirubin, total 0.5 0.2 - 1.0 MG/DL    ALT (SGPT) 39 12 - 78 U/L    AST (SGOT) 22 15 - 37 U/L    Alk.  phosphatase 70 45 - 117 U/L    Protein, total 7.6 6.4 - 8.2 g/dL    Albumin 3.8 3.5 - 5.0 g/dL    Globulin 3.8 2.0 - 4.0 g/dL    A-G Ratio 1.0 (L) 1.1 - 2.2     TROPONIN I   Result Value Ref Range    Troponin-I, Qt. <0.05 <0.05 ng/mL   TROPONIN I   Result Value Ref Range    Troponin-I, Qt. <0.05 <0.05 ng/mL   EKG, 12 LEAD, INITIAL   Result Value Ref Range    Ventricular Rate 89 BPM    Atrial Rate 89 BPM    P-R Interval 154 ms    QRS Duration 88 ms    Q-T Interval 340 ms QTC Calculation (Bezet) 413 ms    Calculated P Axis 43 degrees    Calculated R Axis 18 degrees    Calculated T Axis 32 degrees    Diagnosis       Normal sinus rhythm  Normal ECG  Confirmed by Yony Nam ([id number]) on 9/24/2019 7:21:25 PM       Assessment/Plan:  Diagnoses and all orders for this visit:    Encounter for medical examination to establish care  -     METABOLIC PANEL, COMPREHENSIVE  -     CBC W/O DIFF    Encounter for immunization  -     INFLUENZA VIRUS VAC QUAD,SPLIT,PRESV FREE SYRINGE IM  -     SD IMMUNIZ ADMIN,1 SINGLE/COMB VAC/TOXOID    Type 2 diabetes mellitus with complication, with long-term current use of insulin ([address]  -     HEMOGLOBIN A1C WITH EAG    Acquired hypothyroidism    Hypertriglyceridemia  -     LIPID PANEL    Depression with anxiety  -     METABOLIC PANEL, COMPREHENSIVE  -     LORazepam (ATIVAN) 0.5 mg tablet; Take 1 Tab by mouth every eight (8) hours as needed for Anxiety. Max Daily Amount: 1.5 mg., Print, Disp-30 Tab, R-0  -     REFERRAL TO PSYCHIATRY    Hypertension goal BP (blood pressure) < 130/80  -     chlorthalidone (HYGROTEN) 25 mg tablet; Take 1 Tab by mouth daily. , Normal, Disp-90 Tab, R-1  -     lisinopril (PRINIVIL, ZESTRIL) 10 mg tablet; TAKE 1 TABLET BY MOUTH ONCE DAILY, Normal, Disp-90 Tab, R-3  -     METABOLIC PANEL, COMPREHENSIVE    Vitamin D deficiency  -     VITAMIN D, 25 HYDROXY    Frequency of urination  -     URINALYSIS W/ RFLX MICROSCOPIC    Other orders  -     Discontinue: LORazepam (ATIVAN) 0.5 mg tablet; Take 1 Tab by mouth every eight (8) hours as needed for Anxiety. Max Daily Amount: 1.5 mg., Print, Disp-20 Tab, R-0      Patient Instructions     Vaccine Information Statement    Influenza (Flu) Vaccine (Inactivated or Recombinant): What You Need to Know    Many Vaccine Information Statements are available in [place]ian and other languages. See www.immunize.org/vis  Hojas de información sobre vacunas están disponibles en español y en muchos otros idiomas. Visite www.immunize.org/vis    1. Why get vaccinated? Influenza vaccine can prevent influenza (flu). Flu is a contagious disease that spreads around the [place] every year, usually between October and May. Anyone can get the flu, but it is more dangerous for some people. Infants and young children, people 72years of age and older, pregnant women, and people with certain health conditions or a weakened immune system are at greatest risk of flu complications. Pneumonia, bronchitis, sinus infections and ear infections are examples of flu-related complications. If you have a medical condition, such as heart disease, cancer or diabetes, flu can make it worse. Flu can cause fever and chills, sore throat, muscle aches, fatigue, cough, headache, and runny or stuffy nose. Some people may have vomiting and diarrhea, though this is more common in children than adults. Each year thousands of people in the [facility] die from flu, and many more are hospitalized. Flu vaccine prevents millions of illnesses and flu-related visits to the doctor each year. 2. Influenza vaccines     CDC recommends everyone 10months of age and older get vaccinated every flu season. Children 6 months through 6years of age may need 2 doses during a single flu season. Everyone else needs only 1 dose each flu season. It takes about 2 weeks for protection to develop after vaccination. There are many flu viruses, and they are always changing. Each year a new flu vaccine is made to protect against three or four viruses that are likely to cause disease in the upcoming flu season. Even when the vaccine doesnt exactly match these viruses, it may still provide some protection. Influenza vaccine does not cause flu. Influenza vaccine may be given at the same time as other vaccines.     3. Talk with your health care provider    Tell your vaccine provider if the person getting the vaccine:   Has had an allergic reaction after a previous dose of influenza vaccine, or has any severe, life-threatening allergies.  Has ever had Guillain-Barré Syndrome (also called GBS). In some cases, your health care provider may decide to postpone influenza vaccination to a future visit. People with minor illnesses, such as a cold, may be vaccinated. People who are moderately or severely ill should usually wait until they recover before getting influenza vaccine. Your health care provider can give you more information. 4. Risks of a reaction     Soreness, redness, and swelling where shot is given, fever, muscle aches, and headache can happen after influenza vaccine.  There may be a very small increased risk of Guillain-Barré Syndrome (GBS) after inactivated influenza vaccine (the flu shot). Irl Pae children who get the flu shot along with pneumococcal vaccine (PCV13), and/or DTaP vaccine at the same time might be slightly more likely to have a seizure caused by fever. Tell your health care provider if a child who is getting flu vaccine has ever had a seizure. People sometimes faint after medical procedures, including vaccination. Tell your provider if you feel dizzy or have vision changes or ringing in the ears. As with any medicine, there is a very remote chance of a vaccine causing a severe allergic reaction, other serious injury, or death. 5. What if there is a serious problem? An allergic reaction could occur after the vaccinated person leaves the clinic. If you see signs of a severe allergic reaction (hives, swelling of the face and throat, difficulty breathing, a fast heartbeat, dizziness, or weakness), call 9-1-1 and get the person to the nearest hospital.    For other signs that concern you, call your health care provider. Adverse reactions should be reported to the Vaccine Adverse Event Reporting System (VAERS). Your health care provider will usually file this report, or you can do it yourself.  Visit the VAERS website at www.vaers. hhs.gov or call 4-326.793.3064. VAERS is only for reporting reactions, and VAERS staff do not give medical advice. 6. The National Vaccine Injury Compensation Program    The [facility] Vaccine Injury Compensation Program (VICP) is a federal program that was created to compensate people who may have been injured by certain vaccines. Visit the VICP website at www.[facility].gov/vaccinecompensation or call 0-128.725.1901 to learn about the program and about filing a claim. There is a time limit to file a claim for compensation. 7. How can I learn more?  Ask your health care provider.  Call your local or state health department.  Contact the Centers for Disease Control and Prevention (CDC):  - Call 6-884.722.3531 (1-800-CDC-INFO) or  - Visit CDCs influenza website at www.cdc.gov/flu    Vaccine Information Statement (Interim)  Inactivated Influenza Vaccine   8/15/2019  42 UAshley Thurston [id number]   Department of Health and Human Services  Centers for Disease Control and Prevention    Office Use Only        Follow-up and Dispositions    · Return 2-3 weeks, for f/u results.

## 2019-10-17 NOTE — PATIENT INSTRUCTIONS
Vaccine Information Statement    Influenza (Flu) Vaccine (Inactivated or Recombinant): What You Need to Know    Many Vaccine Information Statements are available in Persian and other languages. See www.immunize.org/vis  Hojas de información sobre vacunas están disponibles en español y en muchos otros idiomas. Visite www.immunize.org/vis    1. Why get vaccinated? Influenza vaccine can prevent influenza (flu). Flu is a contagious disease that spreads around the [facility] every year, usually between October and May. Anyone can get the flu, but it is more dangerous for some people. Infants and young children, people 72years of age and older, pregnant women, and people with certain health conditions or a weakened immune system are at greatest risk of flu complications. Pneumonia, bronchitis, sinus infections and ear infections are examples of flu-related complications. If you have a medical condition, such as heart disease, cancer or diabetes, flu can make it worse. Flu can cause fever and chills, sore throat, muscle aches, fatigue, cough, headache, and runny or stuffy nose. Some people may have vomiting and diarrhea, though this is more common in children than adults. Each year thousands of people in the [facility] die from flu, and many more are hospitalized. Flu vaccine prevents millions of illnesses and flu-related visits to the doctor each year. 2. Influenza vaccines     CDC recommends everyone 10months of age and older get vaccinated every flu season. Children 6 months through 6years of age may need 2 doses during a single flu season. Everyone else needs only 1 dose each flu season. It takes about 2 weeks for protection to develop after vaccination. There are many flu viruses, and they are always changing. Each year a new flu vaccine is made to protect against three or four viruses that are likely to cause disease in the upcoming flu season.  Even when the vaccine doesnt exactly match these viruses, it may still provide some protection. Influenza vaccine does not cause flu. Influenza vaccine may be given at the same time as other vaccines. 3. Talk with your health care provider    Tell your vaccine provider if the person getting the vaccine:   Has had an allergic reaction after a previous dose of influenza vaccine, or has any severe, life-threatening allergies.  Has ever had Guillain-Barré Syndrome (also called GBS). In some cases, your health care provider may decide to postpone influenza vaccination to a future visit. People with minor illnesses, such as a cold, may be vaccinated. People who are moderately or severely ill should usually wait until they recover before getting influenza vaccine. Your health care provider can give you more information. 4. Risks of a reaction     Soreness, redness, and swelling where shot is given, fever, muscle aches, and headache can happen after influenza vaccine.  There may be a very small increased risk of Guillain-Barré Syndrome (GBS) after inactivated influenza vaccine (the flu shot). [facility] children who get the flu shot along with pneumococcal vaccine (PCV13), and/or DTaP vaccine at the same time might be slightly more likely to have a seizure caused by fever. Tell your health care provider if a child who is getting flu vaccine has ever had a seizure. People sometimes faint after medical procedures, including vaccination. Tell your provider if you feel dizzy or have vision changes or ringing in the ears. As with any medicine, there is a very remote chance of a vaccine causing a severe allergic reaction, other serious injury, or death. 5. What if there is a serious problem? An allergic reaction could occur after the vaccinated person leaves the clinic.  If you see signs of a severe allergic reaction (hives, swelling of the face and throat, difficulty breathing, a fast heartbeat, dizziness, or weakness), call 9-1-1 and get the person to the nearest hospital.    For other signs that concern you, call your health care provider. Adverse reactions should be reported to the Vaccine Adverse Event Reporting System (VAERS). Your health care provider will usually file this report, or you can do it yourself. Visit the VAERS website at www.vaers. [facility].gov or call 2-886.376.4631. VAERS is only for reporting reactions, and VAERS staff do not give medical advice. 6. The National Vaccine Injury Compensation Program    The [facility] Vaccine Injury Compensation Program (VICP) is a federal program that was created to compensate people who may have been injured by certain vaccines. Visit the VICP website at www.[facility].gov/vaccinecompensation or call 6-990.740.3860 to learn about the program and about filing a claim. There is a time limit to file a claim for compensation. 7. How can I learn more?  Ask your health care provider.  Call your local or state health department.  Contact the Centers for Disease Control and Prevention (CDC):  - Call 1-666.714.5055 (7-572-HTM-INFO) or  - Visit CDCs influenza website at www.cdc.gov/flu    Vaccine Information Statement (Interim)  Inactivated Influenza Vaccine   8/15/2019  42 MEREDITH Allison [id number]   Department of Health and Human Services  Centers for Disease Control and Prevention    Office Use Only

## 2019-10-18 ENCOUNTER — TELEPHONE (OUTPATIENT)
Dept: ENDOCRINOLOGY | Age: 39
End: 2019-10-18

## 2019-10-18 NOTE — TELEPHONE ENCOUNTER
Reviewed BG logs (see scanned documents)  Pt instructed to continue the NPH 80 units in the AM and 80 units HS, but he has been requiring an lot of correction R so will have him increase his R with meals to 20 units TID/AC    Pt voices understanding and agreement with the plan.

## 2019-10-23 LAB
25(OH)D3+25(OH)D2 SERPL-MCNC: 12 NG/ML (ref 30–100)
ALBUMIN SERPL-MCNC: 4.2 G/DL (ref 3.5–5.5)
ALBUMIN SERPL-MCNC: 4.3 G/DL (ref 3.5–5.5)
ALBUMIN/GLOB SERPL: 1.6 {RATIO} (ref 1.2–2.2)
ALBUMIN/GLOB SERPL: 1.7 {RATIO} (ref 1.2–2.2)
ALP SERPL-CCNC: 85 IU/L (ref 39–117)
ALP SERPL-CCNC: 88 IU/L (ref 39–117)
ALT SERPL-CCNC: 55 IU/L (ref 0–44)
ALT SERPL-CCNC: 56 IU/L (ref 0–44)
APPEARANCE UR: CLEAR
AST SERPL-CCNC: 17 IU/L (ref 0–40)
AST SERPL-CCNC: 17 IU/L (ref 0–40)
BILIRUB SERPL-MCNC: 0.3 MG/DL (ref 0–1.2)
BILIRUB SERPL-MCNC: 0.3 MG/DL (ref 0–1.2)
BILIRUB UR QL STRIP: NEGATIVE
BUN SERPL-MCNC: 17 MG/DL (ref 6–24)
BUN SERPL-MCNC: 19 MG/DL (ref 6–24)
BUN/CREAT SERPL: 15 (ref 9–20)
BUN/CREAT SERPL: 18 (ref 9–20)
CALCIUM SERPL-MCNC: 9.3 MG/DL (ref 8.7–10.2)
CALCIUM SERPL-MCNC: 9.3 MG/DL (ref 8.7–10.2)
CHLORIDE SERPL-SCNC: 95 MMOL/L (ref 96–106)
CHLORIDE SERPL-SCNC: 97 MMOL/L (ref 96–106)
CHOLEST SERPL-MCNC: 196 MG/DL (ref 100–199)
CO2 SERPL-SCNC: 22 MMOL/L (ref 20–29)
CO2 SERPL-SCNC: 23 MMOL/L (ref 20–29)
COLOR UR: YELLOW
CREAT SERPL-MCNC: 1.08 MG/DL (ref 0.76–1.27)
CREAT SERPL-MCNC: 1.13 MG/DL (ref 0.76–1.27)
ERYTHROCYTE [DISTWIDTH] IN BLOOD BY AUTOMATED COUNT: 12.8 % (ref 12.3–15.4)
ERYTHROCYTE [DISTWIDTH] IN BLOOD BY AUTOMATED COUNT: 12.9 % (ref 12.3–15.4)
EST. AVERAGE GLUCOSE BLD GHB EST-MCNC: 243 MG/DL
GLOBULIN SER CALC-MCNC: 2.5 G/DL (ref 1.5–4.5)
GLOBULIN SER CALC-MCNC: 2.7 G/DL (ref 1.5–4.5)
GLUCOSE SERPL-MCNC: 320 MG/DL (ref 65–99)
GLUCOSE SERPL-MCNC: 322 MG/DL (ref 65–99)
GLUCOSE UR QL: ABNORMAL
HBA1C MFR BLD: 10.1 % (ref 4.8–5.6)
HCT VFR BLD AUTO: 41.1 % (ref 37.5–51)
HCT VFR BLD AUTO: 41.4 % (ref 37.5–51)
HDLC SERPL-MCNC: 27 MG/DL
HGB BLD-MCNC: 13.6 G/DL (ref 13–17.7)
HGB BLD-MCNC: 13.7 G/DL (ref 13–17.7)
HGB UR QL STRIP: NEGATIVE
KETONES UR QL STRIP: NEGATIVE
LDLC SERPL CALC-MCNC: ABNORMAL MG/DL (ref 0–99)
LEUKOCYTE ESTERASE UR QL STRIP: NEGATIVE
MCH RBC QN AUTO: 28.4 PG (ref 26.6–33)
MCH RBC QN AUTO: 28.8 PG (ref 26.6–33)
MCHC RBC AUTO-ENTMCNC: 33.1 G/DL (ref 31.5–35.7)
MCHC RBC AUTO-ENTMCNC: 33.1 G/DL (ref 31.5–35.7)
MCV RBC AUTO: 86 FL (ref 79–97)
MCV RBC AUTO: 87 FL (ref 79–97)
MICRO URNS: ABNORMAL
NITRITE UR QL STRIP: NEGATIVE
PH UR STRIP: 5.5 [PH] (ref 5–7.5)
PLATELET # BLD AUTO: 220 X10E3/UL (ref 150–450)
PLATELET # BLD AUTO: 232 X10E3/UL (ref 150–450)
POTASSIUM SERPL-SCNC: 4.5 MMOL/L (ref 3.5–5.2)
POTASSIUM SERPL-SCNC: 4.5 MMOL/L (ref 3.5–5.2)
PROT SERPL-MCNC: 6.8 G/DL (ref 6–8.5)
PROT SERPL-MCNC: 6.9 G/DL (ref 6–8.5)
PROT UR QL STRIP: NEGATIVE
RBC # BLD AUTO: 4.73 X10E6/UL (ref 4.14–5.8)
RBC # BLD AUTO: 4.83 X10E6/UL (ref 4.14–5.8)
SODIUM SERPL-SCNC: 133 MMOL/L (ref 134–144)
SODIUM SERPL-SCNC: 135 MMOL/L (ref 134–144)
SP GR UR: >=1.03 (ref 1–1.03)
T4 FREE SERPL-MCNC: 1.16 NG/DL (ref 0.82–1.77)
TESTOST FREE SERPL-MCNC: 4.8 PG/ML (ref 6.8–21.5)
TESTOST SERPL-MCNC: 199 NG/DL (ref 264–916)
TRIGL SERPL-MCNC: 493 MG/DL (ref 0–149)
TSH SERPL DL<=0.005 MIU/L-ACNC: 2.37 UIU/ML (ref 0.45–4.5)
UROBILINOGEN UR STRIP-MCNC: 0.2 MG/DL (ref 0.2–1)
VLDLC SERPL CALC-MCNC: ABNORMAL MG/DL (ref 5–40)
WBC # BLD AUTO: 6.5 X10E3/UL (ref 3.4–10.8)
WBC # BLD AUTO: 6.6 X10E3/UL (ref 3.4–10.8)

## 2019-10-25 ENCOUNTER — OFFICE VISIT (OUTPATIENT)
Dept: NEUROLOGY | Age: 39
End: 2019-10-25

## 2019-10-25 ENCOUNTER — TELEPHONE (OUTPATIENT)
Dept: NEUROLOGY | Age: 39
End: 2019-10-25

## 2019-10-25 VITALS
SYSTOLIC BLOOD PRESSURE: 165 MMHG | OXYGEN SATURATION: 97 % | BODY MASS INDEX: 38.06 KG/M2 | DIASTOLIC BLOOD PRESSURE: 95 MMHG | WEIGHT: 257 LBS | HEIGHT: 69 IN | HEART RATE: 94 BPM

## 2019-10-25 DIAGNOSIS — G44.86 CERVICOGENIC HEADACHE: Primary | ICD-10-CM

## 2019-10-25 DIAGNOSIS — G43.009 MIGRAINE WITHOUT AURA AND WITHOUT STATUS MIGRAINOSUS, NOT INTRACTABLE: Primary | ICD-10-CM

## 2019-10-25 DIAGNOSIS — E29.1 HYPOGONADISM IN MALE: Primary | ICD-10-CM

## 2019-10-25 RX ORDER — LIDOCAINE HYDROCHLORIDE 10 MG/ML
INJECTION, SOLUTION EPIDURAL; INFILTRATION; INTRACAUDAL; PERINEURAL
Qty: 1 VIAL | Refills: 0 | Status: SHIPPED | COMMUNITY
Start: 2019-10-25 | End: 2020-05-22

## 2019-10-25 RX ORDER — METHYLPREDNISOLONE ACETATE 40 MG/ML
40 INJECTION, SUSPENSION INTRA-ARTICULAR; INTRALESIONAL; INTRAMUSCULAR; SOFT TISSUE ONCE
Qty: 1 VIAL | Refills: 0 | Status: SHIPPED | COMMUNITY
Start: 2019-10-25 | End: 2019-10-25

## 2019-10-25 NOTE — PATIENT INSTRUCTIONS

## 2019-10-25 NOTE — PROCEDURES
After cleaning the injection site with an alcohol swab, 2 mL of (1 mL Depo-Medrol +1 mL Marcaine) using a 30G 1/2 inch sterile needle was injected at the site of the greater occipital nerve identified at a point midway between the occipital protuberance and the mastoid process. The needle was aspirated prior to injection to exclude vascular compromise. There was no blood loss. After cleaning the injection site with an alcohol swab, 1ml of (0.5 mL Depo-Medrol +0.5 mL marcaine) using a 30G 1/2 inch sterile needle was injected at the site of the lesser occipital nerve identified at a point just medial to the between mastoid process. The needle was aspirated prior to injection to exclude vascular compromise. There was no blood loss. Procedure was performed on the right. Yes

## 2019-10-25 NOTE — PROGRESS NOTES
Discussed results with pt. Will repeat AM fasting Testosterone and check FSH, LH, prolactin and iron panel.

## 2019-10-29 LAB
FSH SERPL-ACNC: 2.1 MIU/ML (ref 1.5–12.4)
IRON SATN MFR SERPL: 22 % (ref 15–55)
IRON SERPL-MCNC: 83 UG/DL (ref 38–169)
LH SERPL-ACNC: 3 MIU/ML (ref 1.7–8.6)
PROLACTIN SERPL-MCNC: 8.9 NG/ML (ref 4–15.2)
TESTOST FREE SERPL-MCNC: 5.5 PG/ML (ref 6.8–21.5)
TESTOST SERPL-MCNC: 240 NG/DL (ref 264–916)
TIBC SERPL-MCNC: 369 UG/DL (ref 250–450)
UIBC SERPL-MCNC: 286 UG/DL (ref 111–343)

## 2019-10-30 RX ORDER — CLOMIPHENE CITRATE 50 MG/1
TABLET ORAL
Qty: 15 TAB | Refills: 3 | Status: SHIPPED | OUTPATIENT
Start: 2019-10-30 | End: 2020-05-22 | Stop reason: SDUPTHER

## 2019-10-30 NOTE — PROGRESS NOTES
Spoke with pt regarding his labs. We discussed that his testosterone is low and this could be due to his acute and chronic illnesses. We discussed topical testosterone vs oral clomid every other day. Pt would prefer the oral clomind.

## 2019-11-07 ENCOUNTER — TELEPHONE (OUTPATIENT)
Dept: ENDOCRINOLOGY | Age: 39
End: 2019-11-07

## 2019-11-08 ENCOUNTER — TELEPHONE (OUTPATIENT)
Dept: ENDOCRINOLOGY | Age: 39
End: 2019-11-08

## 2019-11-08 NOTE — TELEPHONE ENCOUNTER
Patient states that he is taking 80 units twice a day of Novolin N and between 8-20 units of R depending on how much he eats and his blood sugars. He will drop by his blood sugar readings this afternoon.

## 2019-11-08 NOTE — TELEPHONE ENCOUNTER
We can check an A1C prior to this, but I would like to see his most recent blood sugars, so we can continue to make adjustments to his insulin to get his sugars down.     Is he still taking the Novolin N 80 units in the morning, 80 units at bedtime and the Novolin R 20 units with each meal?

## 2019-11-08 NOTE — TELEPHONE ENCOUNTER
Patient brought in BS readings.  advises to take 25 units of \"R\" with EVERY meal. Patient expressed understanding.

## 2019-11-08 NOTE — TELEPHONE ENCOUNTER
Patient states that he wanted to give an update. He states that he has seen [address] and was found to have a bulging disk in his neck pressingon nerves. He has surgery scheduled 12/12/19. He was told that he has to have an A1C at 8 or under by then. He states that PCP did A1C on 10/22/19. It ws 10.1. Patient states that he is working very hard to stop snacking and eating healthy foods. Since his surgery is 12/12/19, he wonders when he can have another A1C to see if it dropped, since it is less than 3 months.

## 2019-11-18 ENCOUNTER — HOSPITAL ENCOUNTER (EMERGENCY)
Age: 39
Discharge: HOME | End: 2019-11-18
Attending: EMERGENCY MEDICINE
Payer: COMMERCIAL

## 2019-11-18 ENCOUNTER — APPOINTMENT (OUTPATIENT)
Dept: GENERAL RADIOLOGY | Age: 39
End: 2019-11-18
Attending: EMERGENCY MEDICINE
Payer: COMMERCIAL

## 2019-11-18 VITALS
RESPIRATION RATE: 18 BRPM | SYSTOLIC BLOOD PRESSURE: 161 MMHG | OXYGEN SATURATION: 99 % | WEIGHT: 258.6 LBS | TEMPERATURE: 98.6 F | HEART RATE: 92 BPM | BODY MASS INDEX: 38.3 KG/M2 | DIASTOLIC BLOOD PRESSURE: 86 MMHG | HEIGHT: 69 IN

## 2019-11-18 DIAGNOSIS — M54.2 NECK PAIN: Primary | ICD-10-CM

## 2019-11-18 PROCEDURE — 72050 X-RAY EXAM NECK SPINE 4/5VWS: CPT

## 2019-11-18 PROCEDURE — 96374 THER/PROPH/DIAG INJ IV PUSH: CPT

## 2019-11-18 PROCEDURE — 99282 EMERGENCY DEPT VISIT SF MDM: CPT

## 2019-11-18 PROCEDURE — 74011250636 HC RX REV CODE- 250/636: Performed by: PHYSICIAN ASSISTANT

## 2019-11-18 PROCEDURE — 96375 TX/PRO/DX INJ NEW DRUG ADDON: CPT

## 2019-11-18 RX ORDER — KETOROLAC TROMETHAMINE 30 MG/ML
15 INJECTION, SOLUTION INTRAMUSCULAR; INTRAVENOUS
Status: COMPLETED | OUTPATIENT
Start: 2019-11-18 | End: 2019-11-18

## 2019-11-18 RX ORDER — HYDROCODONE BITARTRATE AND ACETAMINOPHEN 5; 325 MG/1; MG/1
1 TABLET ORAL
Qty: 12 TAB | Refills: 0 | Status: SHIPPED | OUTPATIENT
Start: 2019-11-18 | End: 2019-11-21

## 2019-11-18 RX ORDER — DIAZEPAM 10 MG/2ML
2 INJECTION INTRAMUSCULAR
Status: COMPLETED | OUTPATIENT
Start: 2019-11-18 | End: 2019-11-18

## 2019-11-18 RX ORDER — FENTANYL CITRATE 50 UG/ML
75 INJECTION, SOLUTION INTRAMUSCULAR; INTRAVENOUS
Status: COMPLETED | OUTPATIENT
Start: 2019-11-18 | End: 2019-11-18

## 2019-11-18 RX ADMIN — FENTANYL CITRATE 75 MCG: 50 INJECTION, SOLUTION INTRAMUSCULAR; INTRAVENOUS at 22:54

## 2019-11-18 RX ADMIN — DIAZEPAM 2 MG: 5 INJECTION, SOLUTION INTRAMUSCULAR; INTRAVENOUS at 21:49

## 2019-11-18 RX ADMIN — KETOROLAC TROMETHAMINE 15 MG: 30 INJECTION, SOLUTION INTRAMUSCULAR at 21:48

## 2019-11-19 DIAGNOSIS — E55.9 VITAMIN D DEFICIENCY: Primary | ICD-10-CM

## 2019-11-19 RX ORDER — CHOLECALCIFEROL TAB 125 MCG (5000 UNIT) 125 MCG
5000 TAB ORAL DAILY
Qty: 90 TAB | Refills: 1 | Status: SHIPPED | OUTPATIENT
Start: 2019-11-19 | End: 2021-03-10 | Stop reason: SDUPTHER

## 2019-11-19 NOTE — ED NOTES
Discharge instructions given to patient by LOLLY Vivar. Pt verbalized understanding of discharge instructions. Pt discharged without difficulty. Pt discharged in stable condition via wheelchair, accompanied by mother.

## 2019-11-19 NOTE — ED PROVIDER NOTES
EMERGENCY DEPARTMENT HISTORY AND PHYSICAL EXAM      Date: 11/18/2019  Patient Name: Vitaliy Harper    History of Presenting Illness     Chief Complaint   Patient presents with    Neck Pain     Patient stated that he has a buldging disc in his cervical spine which is causing a bad headache, tingling and numbness in both arm. Patient denies any OTC pain medication prior to arrival. Patient supposed to have surgery next month. Wanting pain meds until he can see his ortho doctor. History Provided By: Patient and Patient's Mother    HPI: Vitaliy Harper, 35 y.o. male with PMHx significant for diabetes, GERD, hypercholesterolemia, hypertension, hypothyroidism, presents to the ED with cc of severe neck pain. The patient reports that he has a herniated disc in his neck and that he is scheduled for surgery in a few weeks. However, the pain has become more severe over the last few days. He has been taking over-the-counter analgesics and Flexeril without relief of pain. He came in tonight because he was no longer able to tolerate the pain. It is radiating into both of his arms. He has paresthesias at times but denies any currently. He denies fever, new injury, arm weakness. There are no other complaints, changes, or physical findings at this time. PCP: Idris Christine MD    No current facility-administered medications on file prior to encounter. Current Outpatient Medications on File Prior to Encounter   Medication Sig Dispense Refill    clomiPHENE (CLOMID) 50 mg tablet One pill every other day 15 Tab 3    lidocaine, PF, (XYLOCAINE) 10 mg/mL (1 %) injection Given in office 1 Vial 0    chlorthalidone (HYGROTEN) 25 mg tablet Take 1 Tab by mouth daily. 90 Tab 1    lisinopril (PRINIVIL, ZESTRIL) 10 mg tablet TAKE 1 TABLET BY MOUTH ONCE DAILY 90 Tab 3    LORazepam (ATIVAN) 0.5 mg tablet Take 1 Tab by mouth every eight (8) hours as needed for Anxiety.  Max Daily Amount: 1.5 mg. 30 Tab 0    ondansetron (ZOFRAN ODT) 4 mg disintegrating tablet Take 1 Tab by mouth every eight (8) hours as needed for Nausea. 10 Tab 0    galcanezumab-gnlm (EMGALITY PEN) 120 mg/mL injection 1 mL by SubCUTAneous route every thirty (30) days. 1 mL 11    cyclobenzaprine (FLEXERIL) 10 mg tablet Take 10 mg by mouth three (3) times daily as needed for Muscle Spasm(s).  aspirin delayed-release 81 mg tablet Take 1 Tab by mouth daily. 30 Tab 0    butalbital-acetaminophen-caffeine (FIORICET, ESGIC) -40 mg per tablet TAKE 1 TABLET BY MOUTH AT THE ONSET OF HEADACHE. CAN TAKE TWICE DAILY BUT NOT MORE THAN 10 DAYS/MONTH.ONLY TO BE FILLED IN 1 MONTH INTERVALS 20 Tab 2    busPIRone (BUSPAR) 15 mg tablet Take 1 Tab by mouth three (3) times daily. (Patient taking differently: Take 15 mg by mouth two (2) times a day.) 90 Tab 3    sertraline (ZOLOFT) 100 mg tablet Take 1 Tab by mouth daily.  metFORMIN (GLUCOPHAGE) 500 mg tablet TAKE 1 TABLET BY MOUTH TWICE DAILY WITH MEALS 180 Tab 3    insulin NPH (NOVOLIN N NPH U-100 INSULIN) 100 unit/mL injection by SubCUTAneous route once. 80 units twice a day      levothyroxine (SYNTHROID) 112 mcg tablet TAKE 1 TABLET BY MOUTH ONCE DAILY BEFORE BREAKFAST 90 Tab 3    fenofibrate (LOFIBRA) 160 mg tablet TAKE 1 TABLET BY MOUTH ONCE DAILY 90 Tab 3    rosuvastatin (CRESTOR) 20 mg tablet Take 1 Tab by mouth nightly.  90 Tab 3    insulin regular (NOVOLIN R, HUMULIN R) 100 unit/mL injection 15-20 units TID AC (Patient taking differently: 10 units 2-3 times per day  Indications: 0-20u BID, per pt) 1 Vial 12       Past History     Past Medical History:  Past Medical History:   Diagnosis Date    Anxiety     Chronic headaches 2007    Depression     Diabetes ([address]     GERD (gastroesophageal reflux disease)     Hypercholesterolemia     Hypertension     Thyroid disease     hypothyroid    Unspecified sleep apnea     does not use CPAP       Past Surgical History:  Past Surgical History:   Procedure Laterality Date    HX CHOLECYSTECTOMY  14    Dr Lucina Butler    HX HEENT      wisdom teeth removed    HX ORTHOPAEDIC Left 2012    carpel tunnel       Family History:  Family History   Problem Relation Age of Onset    Diabetes Mother     Heart Disease Father     Cancer Father     Diabetes Father     Diabetes Paternal Aunt     Diabetes Maternal Grandmother     Thyroid Cancer Maternal Grandmother     Kidney Disease Maternal Grandmother     Arthritis Maternal Grandmother     Heart Disease Maternal Grandfather     High Cholesterol Maternal Grandfather     Hypertension Maternal Grandfather     Diabetes Paternal Grandmother     Hemophilia Paternal Grandfather        Social History:  Social History     Tobacco Use    Smoking status: Former Smoker     Packs/day: 0.00     Years: 14.00     Pack years: 0.00     Last attempt to quit: 2014     Years since quittin.8    Smokeless tobacco: Never Used   Substance Use Topics    Alcohol use: No    Drug use: No       Allergies: Allergies   Allergen Reactions    Morphine Nausea and Vomiting    Penicillin G Swelling    Percocet [Oxycodone-Acetaminophen] Hives and Nausea and Vomiting    Sulfa (Sulfonamide Antibiotics) Swelling    Tramadol Nausea Only     Nausea and headache           Review of Systems   Review of Systems   Constitutional: Negative for chills and fever. HENT: Negative for ear pain and sore throat. Eyes: Negative for redness and visual disturbance. Respiratory: Negative for cough and shortness of breath. Cardiovascular: Negative for chest pain and palpitations. Gastrointestinal: Negative for abdominal pain, nausea and vomiting. Genitourinary: Negative for dysuria and hematuria. Musculoskeletal: Positive for neck pain. Negative for back pain and gait problem. Skin: Negative for rash and wound. Neurological: Negative for dizziness, weakness, numbness and headaches.    Psychiatric/Behavioral: Negative for behavioral problems and confusion. All other systems reviewed and are negative. Physical Exam   Physical Exam   Constitutional: He is oriented to person, place, and time. He appears well-developed and well-nourished. Pt is uncomfortable appearing. HENT:   Head: Normocephalic and atraumatic. Eyes: Pupils are equal, round, and reactive to light. Conjunctivae and EOM are normal.   Neck: Normal range of motion. Neck supple. Palpable spasms of the right cervical muscles with tenderness to palpation. There is also midline tenderness to palpation of the C-spine diffusely. Cardiovascular: Normal rate, regular rhythm and normal heart sounds. Pulses:       Radial pulses are 2+ on the right side, and 2+ on the left side. Pulmonary/Chest: Effort normal and breath sounds normal.   Musculoskeletal: Normal range of motion. Per MSK exam.   Neurological: He is alert and oriented to person, place, and time. He has normal strength. No cranial nerve deficit or sensory deficit. GCS eye subscore is 4. GCS verbal subscore is 5. GCS motor subscore is 6. Strength 5/5 in bilateral upper extremities. Skin: Skin is warm and dry. No rash noted. Psychiatric: He has a normal mood and affect. His behavior is normal.   Nursing note and vitals reviewed. Diagnostic Study Results     Labs -   No results found for this or any previous visit (from the past 12 hour(s)). Radiologic Studies -   XR SPINE CERV 4 OR 5 V   Final Result   IMPRESSION: No acute bony abnormality of the cervical spine . Loss of the usual   cervical lordosis which may be related to muscle spasm. Mild degenerative   change. .           CT Results  (Last 48 hours)    None        CXR Results  (Last 48 hours)    None            Medical Decision Making   I am the first provider for this patient. I reviewed the vital signs, available nursing notes, past medical history, past surgical history, family history and social history.     Vital Signs-Reviewed the patient's vital signs. Patient Vitals for the past 12 hrs:   Temp Pulse Resp BP SpO2   11/18/19 2022 98.6 °F (37 °C) 92 18 161/86 99 %         Records Reviewed: Nursing Notes and Old Medical Records      Provider Notes (Medical Decision Making):   DDx: herniated disc, muscle spasm, chronic neck pain    Pt has no red flag signs and neuro exam is intact. X-ray was ordered in triage prior to my assessment, which shows findings consistent with muscle spasm. Plan to treat with pain medication and muscle relaxer. ED Course:   Initial assessment performed. The patients presenting problems have been discussed, and they are in agreement with the care plan formulated and outlined with them. I have encouraged them to ask questions as they arise throughout their visit. Disposition:  11:00 PM -    The patient has been re-evaluated and is ready for discharge. Reviewed available results with patient. Counseled patient on diagnosis and care plan. Patient has expressed understanding, and all questions have been answered. Patient agrees with plan and agrees to follow up as recommended, or to return to the ED if their symptoms worsen. Discharge instructions have been provided and explained to the patient, along with reasons to return to the ED. PLAN:  1. Discharge Medication List as of 11/18/2019 10:57 PM      START taking these medications    Details   HYDROcodone-acetaminophen (NORCO) 5-325 mg per tablet Take 1 Tab by mouth every four (4) hours as needed for Pain for up to 3 days. Max Daily Amount: 6 Tabs., Print, Disp-12 Tab, R-0         CONTINUE these medications which have NOT CHANGED    Details   clomiPHENE (CLOMID) 50 mg tablet One pill every other day, Normal, Disp-15 Tab, R-3      lidocaine, PF, (XYLOCAINE) 10 mg/mL (1 %) injection Given in office, Sample, Disp-1 Vial, R-0      chlorthalidone (HYGROTEN) 25 mg tablet Take 1 Tab by mouth daily. , Normal, Disp-90 Tab, R-1      lisinopril (Deepa Aguirre) 10 mg tablet TAKE 1 TABLET BY MOUTH ONCE DAILY, Normal, Disp-90 Tab, R-3      LORazepam (ATIVAN) 0.5 mg tablet Take 1 Tab by mouth every eight (8) hours as needed for Anxiety. Max Daily Amount: 1.5 mg., Print, Disp-30 Tab, R-0      ondansetron (ZOFRAN ODT) 4 mg disintegrating tablet Take 1 Tab by mouth every eight (8) hours as needed for Nausea., Normal, Disp-10 Tab, R-0      galcanezumab-gnlm (EMGALITY PEN) 120 mg/mL injection 1 mL by SubCUTAneous route every thirty (30) days. , Print, Disp-1 mL, R-11      cyclobenzaprine (FLEXERIL) 10 mg tablet Take 10 mg by mouth three (3) times daily as needed for Muscle Spasm(s). , Historical Med      aspirin delayed-release 81 mg tablet Take 1 Tab by mouth daily. , Print, Disp-30 Tab, R-0      butalbital-acetaminophen-caffeine (FIORICET, ESGIC) -40 mg per tablet TAKE 1 TABLET BY MOUTH AT THE ONSET OF HEADACHE. CAN TAKE TWICE DAILY BUT NOT MORE THAN 10 DAYS/MONTH.ONLY TO BE FILLED IN 1 MONTH INTERVALS, NormalPlease consider 90 day supplies to promote better adherenceDisp-20 Tab, R-2      busPIRone (BUSPAR) 15 mg tablet Take 1 Tab by mouth three (3) times daily. , Normal, Disp-90 Tab, R-3      sertraline (ZOLOFT) 100 mg tablet Take 1 Tab by mouth daily. , Historical MedPlease consider 90 day supplies to promote better adherence      metFORMIN (GLUCOPHAGE) 500 mg tablet TAKE 1 TABLET BY MOUTH TWICE DAILY WITH MEALS, Normal, Disp-180 Tab, R-3      insulin NPH (NOVOLIN N NPH U-100 INSULIN) 100 unit/mL injection by SubCUTAneous route once.  80 units twice a day, Historical Med      levothyroxine (SYNTHROID) 112 mcg tablet TAKE 1 TABLET BY MOUTH ONCE DAILY BEFORE BREAKFAST, Normal, Disp-90 Tab, R-3      fenofibrate (LOFIBRA) 160 mg tablet TAKE 1 TABLET BY MOUTH ONCE DAILY, NormalPlease consider 90 day supplies to promote better adherenceDisp-90 Tab, R-3      rosuvastatin (CRESTOR) 20 mg tablet Take 1 Tab by mouth nightly., Normal, Disp-90 Tab, R-3      insulin regular (NOVOLIN R, HUMULIN R) 100 unit/mL injection 15-20 units TID AC, No Print, Disp-1 Vial, R-12           2. Follow-up Information     Follow up With Specialties Details Why Contact Info    Tacos Champion MD Orthopedic Surgery Call in 1 day to schedule a follow up appointment Pradip Younger [phone]      [facility] EMERGENCY DEPT Emergency Medicine Go to If symptoms worsen [address] [place] [address]  [phone]        Return to ED if worse     Diagnosis     Clinical Impression:   1. Neck pain            Erik Sinha.  MARGARITA Vivar

## 2019-11-26 ENCOUNTER — HOSPITAL ENCOUNTER (OUTPATIENT)
Dept: GENERAL RADIOLOGY | Age: 39
Discharge: HOME | DRG: 246 | End: 2019-11-26
Payer: COMMERCIAL

## 2019-11-26 DIAGNOSIS — R52 PAIN: ICD-10-CM

## 2019-11-26 PROCEDURE — 71046 X-RAY EXAM CHEST 2 VIEWS: CPT

## 2019-11-27 DIAGNOSIS — F41.8 DEPRESSION WITH ANXIETY: ICD-10-CM

## 2019-11-27 RX ORDER — LORAZEPAM 0.5 MG/1
TABLET ORAL
Qty: 30 TAB | Refills: 0 | Status: SHIPPED | OUTPATIENT
Start: 2019-11-27 | End: 2020-01-27 | Stop reason: SDUPTHER

## 2019-11-28 ENCOUNTER — HOSPITAL ENCOUNTER (INPATIENT)
Age: 39
LOS: 5 days | Discharge: HOME | DRG: 246 | End: 2019-12-03
Attending: EMERGENCY MEDICINE | Admitting: INTERNAL MEDICINE
Payer: COMMERCIAL

## 2019-11-28 ENCOUNTER — APPOINTMENT (OUTPATIENT)
Dept: GENERAL RADIOLOGY | Age: 39
DRG: 246 | End: 2019-11-28
Attending: EMERGENCY MEDICINE
Payer: COMMERCIAL

## 2019-11-28 DIAGNOSIS — I10 HYPERTENSION GOAL BP (BLOOD PRESSURE) < 130/80: ICD-10-CM

## 2019-11-28 DIAGNOSIS — I10 ACCELERATED HYPERTENSION: ICD-10-CM

## 2019-11-28 DIAGNOSIS — I24.9 ACS (ACUTE CORONARY SYNDROME) (HCC): ICD-10-CM

## 2019-11-28 DIAGNOSIS — N17.9 AKI (ACUTE KIDNEY INJURY) (HCC): ICD-10-CM

## 2019-11-28 DIAGNOSIS — R07.9 ACUTE CHEST PAIN: ICD-10-CM

## 2019-11-28 DIAGNOSIS — I20.0 UNSTABLE ANGINA (HCC): Primary | ICD-10-CM

## 2019-11-28 LAB
ALBUMIN SERPL-MCNC: 3.8 G/DL (ref 3.5–5)
ALBUMIN/GLOB SERPL: 1.1 {RATIO} (ref 1.1–2.2)
ALP SERPL-CCNC: 66 U/L (ref 45–117)
ALT SERPL-CCNC: 47 U/L (ref 12–78)
ANION GAP SERPL CALC-SCNC: 7 MMOL/L (ref 5–15)
AST SERPL-CCNC: 21 U/L (ref 15–37)
ATRIAL RATE: 100 BPM
BASOPHILS # BLD: 0.1 K/UL (ref 0–0.1)
BASOPHILS NFR BLD: 1 % (ref 0–1)
BILIRUB SERPL-MCNC: 0.4 MG/DL (ref 0.2–1)
BNP SERPL-MCNC: <5 PG/ML
BUN SERPL-MCNC: 19 MG/DL (ref 6–20)
BUN/CREAT SERPL: 14 (ref 12–20)
CALCIUM SERPL-MCNC: 9.4 MG/DL (ref 8.5–10.1)
CALCULATED P AXIS, ECG09: 58 DEGREES
CALCULATED R AXIS, ECG10: 52 DEGREES
CALCULATED T AXIS, ECG11: 71 DEGREES
CHLORIDE SERPL-SCNC: 103 MMOL/L (ref 97–108)
CK MB CFR SERPL CALC: 1.7 % (ref 0–2.5)
CK MB SERPL-MCNC: 1.4 NG/ML (ref 5–25)
CK SERPL-CCNC: 81 U/L (ref 39–308)
CK SERPL-CCNC: 94 U/L (ref 39–308)
CO2 SERPL-SCNC: 27 MMOL/L (ref 21–32)
COMMENT, HOLDF: NORMAL
CREAT SERPL-MCNC: 1.33 MG/DL (ref 0.7–1.3)
D DIMER PPP FEU-MCNC: <0.19 MG/L FEU (ref 0–0.65)
DIAGNOSIS, 93000: NORMAL
DIFFERENTIAL METHOD BLD: ABNORMAL
EOSINOPHIL # BLD: 0.3 K/UL (ref 0–0.4)
EOSINOPHIL NFR BLD: 3 % (ref 0–7)
ERYTHROCYTE [DISTWIDTH] IN BLOOD BY AUTOMATED COUNT: 13.6 % (ref 11.5–14.5)
GLOBULIN SER CALC-MCNC: 3.6 G/DL (ref 2–4)
GLUCOSE BLD STRIP.AUTO-MCNC: 166 MG/DL (ref 65–100)
GLUCOSE BLD STRIP.AUTO-MCNC: 191 MG/DL (ref 65–100)
GLUCOSE BLD STRIP.AUTO-MCNC: 221 MG/DL (ref 65–100)
GLUCOSE BLD STRIP.AUTO-MCNC: 335 MG/DL (ref 65–100)
GLUCOSE SERPL-MCNC: 164 MG/DL (ref 65–100)
HCT VFR BLD AUTO: 44.5 % (ref 36.6–50.3)
HGB BLD-MCNC: 14.7 G/DL (ref 12.1–17)
IMM GRANULOCYTES # BLD AUTO: 0.2 K/UL (ref 0–0.04)
IMM GRANULOCYTES NFR BLD AUTO: 1 % (ref 0–0.5)
LYMPHOCYTES # BLD: 4.1 K/UL (ref 0.8–3.5)
LYMPHOCYTES NFR BLD: 36 % (ref 12–49)
MCH RBC QN AUTO: 28.9 PG (ref 26–34)
MCHC RBC AUTO-ENTMCNC: 33 G/DL (ref 30–36.5)
MCV RBC AUTO: 87.4 FL (ref 80–99)
MONOCYTES # BLD: 0.8 K/UL (ref 0–1)
MONOCYTES NFR BLD: 7 % (ref 5–13)
NEUTS SEG # BLD: 5.9 K/UL (ref 1.8–8)
NEUTS SEG NFR BLD: 52 % (ref 32–75)
NRBC # BLD: 0 K/UL (ref 0–0.01)
NRBC BLD-RTO: 0 PER 100 WBC
P-R INTERVAL, ECG05: 158 MS
PLATELET # BLD AUTO: 214 K/UL (ref 150–400)
PMV BLD AUTO: 10.5 FL (ref 8.9–12.9)
POTASSIUM SERPL-SCNC: 3.8 MMOL/L (ref 3.5–5.1)
PROT SERPL-MCNC: 7.4 G/DL (ref 6.4–8.2)
Q-T INTERVAL, ECG07: 302 MS
QRS DURATION, ECG06: 86 MS
QTC CALCULATION (BEZET), ECG08: 389 MS
RBC # BLD AUTO: 5.09 M/UL (ref 4.1–5.7)
SAMPLES BEING HELD,HOLD: NORMAL
SERVICE CMNT-IMP: ABNORMAL
SODIUM SERPL-SCNC: 137 MMOL/L (ref 136–145)
TROPONIN I BLD-MCNC: <0.04 NG/ML (ref 0–0.08)
TROPONIN I SERPL-MCNC: <0.05 NG/ML
VENTRICULAR RATE, ECG03: 100 BPM
WBC # BLD AUTO: 11.3 K/UL (ref 4.1–11.1)

## 2019-11-28 PROCEDURE — 94760 N-INVAS EAR/PLS OXIMETRY 1: CPT

## 2019-11-28 PROCEDURE — 77010033678 HC OXYGEN DAILY

## 2019-11-28 PROCEDURE — 65660000000 HC RM CCU STEPDOWN

## 2019-11-28 PROCEDURE — 36415 COLL VENOUS BLD VENIPUNCTURE: CPT

## 2019-11-28 PROCEDURE — 93005 ELECTROCARDIOGRAM TRACING: CPT

## 2019-11-28 PROCEDURE — 96375 TX/PRO/DX INJ NEW DRUG ADDON: CPT

## 2019-11-28 PROCEDURE — 82550 ASSAY OF CK (CPK): CPT

## 2019-11-28 PROCEDURE — 96372 THER/PROPH/DIAG INJ SC/IM: CPT

## 2019-11-28 PROCEDURE — 71045 X-RAY EXAM CHEST 1 VIEW: CPT

## 2019-11-28 PROCEDURE — 74011250637 HC RX REV CODE- 250/637: Performed by: INTERNAL MEDICINE

## 2019-11-28 PROCEDURE — 74011636637 HC RX REV CODE- 636/637: Performed by: INTERNAL MEDICINE

## 2019-11-28 PROCEDURE — 74011250636 HC RX REV CODE- 250/636: Performed by: INTERNAL MEDICINE

## 2019-11-28 PROCEDURE — 82553 CREATINE MB FRACTION: CPT

## 2019-11-28 PROCEDURE — 96374 THER/PROPH/DIAG INJ IV PUSH: CPT

## 2019-11-28 PROCEDURE — 74011000250 HC RX REV CODE- 250: Performed by: EMERGENCY MEDICINE

## 2019-11-28 PROCEDURE — 85379 FIBRIN DEGRADATION QUANT: CPT

## 2019-11-28 PROCEDURE — 99218 HC RM OBSERVATION: CPT

## 2019-11-28 PROCEDURE — 83880 ASSAY OF NATRIURETIC PEPTIDE: CPT

## 2019-11-28 PROCEDURE — 84484 ASSAY OF TROPONIN QUANT: CPT

## 2019-11-28 PROCEDURE — 85025 COMPLETE CBC W/AUTO DIFF WBC: CPT

## 2019-11-28 PROCEDURE — 74011250636 HC RX REV CODE- 250/636: Performed by: EMERGENCY MEDICINE

## 2019-11-28 PROCEDURE — 96376 TX/PRO/DX INJ SAME DRUG ADON: CPT

## 2019-11-28 PROCEDURE — 82962 GLUCOSE BLOOD TEST: CPT

## 2019-11-28 PROCEDURE — 99285 EMERGENCY DEPT VISIT HI MDM: CPT

## 2019-11-28 PROCEDURE — 80053 COMPREHEN METABOLIC PANEL: CPT

## 2019-11-28 PROCEDURE — 74011250637 HC RX REV CODE- 250/637: Performed by: EMERGENCY MEDICINE

## 2019-11-28 RX ORDER — ONDANSETRON 2 MG/ML
4 INJECTION INTRAMUSCULAR; INTRAVENOUS
Status: DISCONTINUED | OUTPATIENT
Start: 2019-11-28 | End: 2019-12-03 | Stop reason: HOSPADM

## 2019-11-28 RX ORDER — BUSPIRONE HYDROCHLORIDE 10 MG/1
15 TABLET ORAL 2 TIMES DAILY
Status: DISCONTINUED | OUTPATIENT
Start: 2019-11-29 | End: 2019-12-03 | Stop reason: HOSPADM

## 2019-11-28 RX ORDER — LISINOPRIL 5 MG/1
10 TABLET ORAL DAILY
Status: DISCONTINUED | OUTPATIENT
Start: 2019-11-28 | End: 2019-11-29

## 2019-11-28 RX ORDER — LEVOTHYROXINE SODIUM 50 UG/1
100 TABLET ORAL
Status: DISCONTINUED | OUTPATIENT
Start: 2019-11-28 | End: 2019-12-03 | Stop reason: HOSPADM

## 2019-11-28 RX ORDER — SERTRALINE HYDROCHLORIDE 50 MG/1
100 TABLET, FILM COATED ORAL DAILY
Status: DISCONTINUED | OUTPATIENT
Start: 2019-11-28 | End: 2019-12-03 | Stop reason: HOSPADM

## 2019-11-28 RX ORDER — SODIUM CHLORIDE 9 MG/ML
75 INJECTION, SOLUTION INTRAVENOUS CONTINUOUS
Status: DISCONTINUED | OUTPATIENT
Start: 2019-11-28 | End: 2019-11-30

## 2019-11-28 RX ORDER — NITROGLYCERIN 0.4 MG/1
0.4 TABLET SUBLINGUAL
Status: DISCONTINUED | OUTPATIENT
Start: 2019-11-28 | End: 2019-12-03 | Stop reason: HOSPADM

## 2019-11-28 RX ORDER — FENOFIBRATE 145 MG/1
145 TABLET, COATED ORAL DAILY
Status: DISCONTINUED | OUTPATIENT
Start: 2019-11-28 | End: 2019-12-03 | Stop reason: HOSPADM

## 2019-11-28 RX ORDER — MAGNESIUM SULFATE 100 %
4 CRYSTALS MISCELLANEOUS AS NEEDED
Status: DISCONTINUED | OUTPATIENT
Start: 2019-11-28 | End: 2019-12-03 | Stop reason: HOSPADM

## 2019-11-28 RX ORDER — FENTANYL CITRATE 50 UG/ML
25 INJECTION, SOLUTION INTRAMUSCULAR; INTRAVENOUS
Status: DISCONTINUED | OUTPATIENT
Start: 2019-11-28 | End: 2019-12-03 | Stop reason: HOSPADM

## 2019-11-28 RX ORDER — ASPIRIN 81 MG/1
81 TABLET ORAL DAILY
Status: DISCONTINUED | OUTPATIENT
Start: 2019-11-28 | End: 2019-12-03 | Stop reason: HOSPADM

## 2019-11-28 RX ORDER — FENTANYL CITRATE 50 UG/ML
100 INJECTION, SOLUTION INTRAMUSCULAR; INTRAVENOUS
Status: COMPLETED | OUTPATIENT
Start: 2019-11-28 | End: 2019-11-28

## 2019-11-28 RX ORDER — METOPROLOL TARTRATE 25 MG/1
25 TABLET, FILM COATED ORAL EVERY 12 HOURS
Status: DISCONTINUED | OUTPATIENT
Start: 2019-11-28 | End: 2019-11-29

## 2019-11-28 RX ORDER — SODIUM CHLORIDE 0.9 % (FLUSH) 0.9 %
5-40 SYRINGE (ML) INJECTION EVERY 8 HOURS
Status: DISCONTINUED | OUTPATIENT
Start: 2019-11-28 | End: 2019-12-03 | Stop reason: HOSPADM

## 2019-11-28 RX ORDER — ENOXAPARIN SODIUM 100 MG/ML
40 INJECTION SUBCUTANEOUS EVERY 24 HOURS
Status: DISCONTINUED | OUTPATIENT
Start: 2019-11-28 | End: 2019-12-03 | Stop reason: HOSPADM

## 2019-11-28 RX ORDER — DEXTROSE 50 % IN WATER (D50W) INTRAVENOUS SYRINGE
25-50 AS NEEDED
Status: DISCONTINUED | OUTPATIENT
Start: 2019-11-28 | End: 2019-12-03 | Stop reason: HOSPADM

## 2019-11-28 RX ORDER — CYCLOBENZAPRINE HCL 10 MG
10 TABLET ORAL
Status: DISCONTINUED | OUTPATIENT
Start: 2019-11-28 | End: 2019-12-03 | Stop reason: HOSPADM

## 2019-11-28 RX ORDER — BISACODYL 5 MG
5 TABLET, DELAYED RELEASE (ENTERIC COATED) ORAL DAILY PRN
Status: DISCONTINUED | OUTPATIENT
Start: 2019-11-28 | End: 2019-12-03 | Stop reason: HOSPADM

## 2019-11-28 RX ORDER — ROSUVASTATIN CALCIUM 10 MG/1
20 TABLET, COATED ORAL
Status: DISCONTINUED | OUTPATIENT
Start: 2019-11-28 | End: 2019-12-03 | Stop reason: HOSPADM

## 2019-11-28 RX ORDER — NITROGLYCERIN 20 MG/100ML
0-20 INJECTION INTRAVENOUS
Status: DISCONTINUED | OUTPATIENT
Start: 2019-11-28 | End: 2019-11-28

## 2019-11-28 RX ORDER — SODIUM CHLORIDE 0.9 % (FLUSH) 0.9 %
5-40 SYRINGE (ML) INJECTION AS NEEDED
Status: DISCONTINUED | OUTPATIENT
Start: 2019-11-28 | End: 2019-12-03 | Stop reason: HOSPADM

## 2019-11-28 RX ORDER — INSULIN LISPRO 100 [IU]/ML
INJECTION, SOLUTION INTRAVENOUS; SUBCUTANEOUS
Status: DISCONTINUED | OUTPATIENT
Start: 2019-11-28 | End: 2019-12-03 | Stop reason: HOSPADM

## 2019-11-28 RX ADMIN — SODIUM CHLORIDE 75 ML/HR: 900 INJECTION, SOLUTION INTRAVENOUS at 23:28

## 2019-11-28 RX ADMIN — FENTANYL CITRATE 25 MCG: 50 INJECTION, SOLUTION INTRAMUSCULAR; INTRAVENOUS at 17:10

## 2019-11-28 RX ADMIN — ROSUVASTATIN CALCIUM 20 MG: 10 TABLET, COATED ORAL at 23:22

## 2019-11-28 RX ADMIN — NITROGLYCERIN 1 INCH: 20 OINTMENT TOPICAL at 09:50

## 2019-11-28 RX ADMIN — ASPIRIN 81 MG: 81 TABLET, COATED ORAL at 08:25

## 2019-11-28 RX ADMIN — ENOXAPARIN SODIUM 40 MG: 40 INJECTION SUBCUTANEOUS at 06:08

## 2019-11-28 RX ADMIN — SODIUM CHLORIDE 75 ML/HR: 900 INJECTION, SOLUTION INTRAVENOUS at 08:09

## 2019-11-28 RX ADMIN — NITROGLYCERIN 1 INCH: 20 OINTMENT TOPICAL at 21:36

## 2019-11-28 RX ADMIN — INSULIN LISPRO 3 UNITS: 100 INJECTION, SOLUTION INTRAVENOUS; SUBCUTANEOUS at 16:39

## 2019-11-28 RX ADMIN — NITROGLYCERIN 1 INCH: 20 OINTMENT TOPICAL at 03:06

## 2019-11-28 RX ADMIN — Medication 10 ML: at 14:52

## 2019-11-28 RX ADMIN — INSULIN LISPRO 4 UNITS: 100 INJECTION, SOLUTION INTRAVENOUS; SUBCUTANEOUS at 19:52

## 2019-11-28 RX ADMIN — NITROGLYCERIN 1 INCH: 20 OINTMENT TOPICAL at 15:30

## 2019-11-28 RX ADMIN — Medication 10 ML: at 23:28

## 2019-11-28 RX ADMIN — FENTANYL CITRATE 100 MCG: 50 INJECTION, SOLUTION INTRAMUSCULAR; INTRAVENOUS at 04:24

## 2019-11-28 RX ADMIN — LEVOTHYROXINE SODIUM 100 MCG: 75 TABLET ORAL at 08:24

## 2019-11-28 RX ADMIN — FENOFIBRATE 145 MG: 145 TABLET ORAL at 13:59

## 2019-11-28 RX ADMIN — NITROGLYCERIN 10 MCG/MIN: 20 INJECTION INTRAVENOUS at 04:09

## 2019-11-28 RX ADMIN — METOPROLOL TARTRATE 25 MG: 25 TABLET ORAL at 09:50

## 2019-11-28 RX ADMIN — SERTRALINE HYDROCHLORIDE 100 MG: 50 TABLET ORAL at 08:24

## 2019-11-28 RX ADMIN — NITROGLYCERIN 0.4 MG: 0.4 TABLET, ORALLY DISINTEGRATING SUBLINGUAL at 18:17

## 2019-11-28 RX ADMIN — LISINOPRIL 10 MG: 5 TABLET ORAL at 08:24

## 2019-11-28 RX ADMIN — NITROGLYCERIN 0.4 MG: 0.4 TABLET, ORALLY DISINTEGRATING SUBLINGUAL at 13:33

## 2019-11-28 RX ADMIN — INSULIN LISPRO 2 UNITS: 100 INJECTION, SOLUTION INTRAVENOUS; SUBCUTANEOUS at 09:49

## 2019-11-28 RX ADMIN — Medication 10 ML: at 06:09

## 2019-11-28 RX ADMIN — INSULIN LISPRO 2 UNITS: 100 INJECTION, SOLUTION INTRAVENOUS; SUBCUTANEOUS at 13:59

## 2019-11-28 RX ADMIN — FENTANYL CITRATE 100 MCG: 50 INJECTION, SOLUTION INTRAMUSCULAR; INTRAVENOUS at 03:05

## 2019-11-28 RX ADMIN — METOPROLOL TARTRATE 25 MG: 25 TABLET ORAL at 21:36

## 2019-11-28 NOTE — ED PROVIDER NOTES
EMERGENCY DEPARTMENT HISTORY AND PHYSICAL EXAM      Please note that this dictation was completed with [employer], the computer voice recognition software. Quite often unanticipated grammatical, syntax, homophones, and other interpretive errors are inadvertently transcribed by the computer software. Please disregard these errors and any errors that have escaped final proofreading. Thank you. Date: 11/28/2019  Patient Name: Cindy Joyce  Patient Age and Sex: 35 y.o. male    History of Presenting Illness     Chief Complaint   Patient presents with    Chest Pain       History Provided By: Patient and EMS    HPI: Cindy Joyce, 35 y.o. male with past medical history as documented below presents to the ED with c/o of acute onset of severe, pressure-like left sided chest pain onset about 1 hour PTA. Pt states he was sitting watching TV when episode occurred. Pt reports pain radiating down to left arm. He reports initial pain at 9/10 intensity but currently down to 7/10 after full dose aspirin given by EMS. He reports associated dizziness, diaphoresis during onset of episode which has not subsided. Pt states he's had similar episodes for the past month, was seen by Dr. Jose Anguiano a week ago and had abnormal stress test. He is scheduled for cardiac cath next week. He states the other episodes of chest pain would happen randomly, at rest or with exertion. He does report a h/o of HTN, hyperlipidemia and type 1 DM since age 8. He reports a strong family h/o of early CAD/MI and his father just had PCI last week. Given severity of pain, he called EMS. Vitals were stable with normal 12-lead EKG. He was given aspirin 324mg and one SLN prior to arrival. Pt denies any other alleviating or exacerbating factors.  Additionally, pt specifically denies any recent fever, chills, headache, nausea, vomiting, abdominal pain, numbness, weakness, BLE swelling, heart palpitations, urinary sxs, diarrhea, constipation, melena, hematochezia, cough, or congestion. There are no other complaints, changes or physical findings at this time. PCP: Jacquie Allison MD    Past History   Past Medical History:  Past Medical History:   Diagnosis Date    Anxiety     Chronic headaches 2007    Depression     Diabetes ([address]     GERD (gastroesophageal reflux disease)     Hypercholesterolemia     Hypertension     Thyroid disease     hypothyroid    Unspecified sleep apnea     does not use CPAP       Past Surgical History:  Past Surgical History:   Procedure Laterality Date    HX CHOLECYSTECTOMY  14    Dr Chely Edmonds    HX HEENT      wisdom teeth removed    HX ORTHOPAEDIC Left 2012    carpel tunnel       Family History:  Family History   Problem Relation Age of Onset    Diabetes Mother     Heart Disease Father     Cancer Father     Diabetes Father     Diabetes Paternal Aunt     Diabetes Maternal Grandmother     Thyroid Cancer Maternal Grandmother     Kidney Disease Maternal Grandmother     Arthritis Maternal Grandmother     Heart Disease Maternal Grandfather     High Cholesterol Maternal Grandfather     Hypertension Maternal Grandfather     Diabetes Paternal Grandmother     Hemophilia Paternal Grandfather        Social History:  Social History     Tobacco Use    Smoking status: Former Smoker     Packs/day: 0.00     Years: 14.00     Pack years: 0.00     Last attempt to quit: 2014     Years since quittin.9    Smokeless tobacco: Never Used   Substance Use Topics    Alcohol use: No    Drug use: No       Allergies: Allergies   Allergen Reactions    Morphine Nausea and Vomiting    Penicillin G Swelling    Percocet [Oxycodone-Acetaminophen] Hives and Nausea and Vomiting    Sulfa (Sulfonamide Antibiotics) Swelling    Tramadol Nausea Only     Nausea and headache         Current Medications:  No current facility-administered medications on file prior to encounter.       Current Outpatient Medications on File Prior to Encounter Medication Sig Dispense Refill    ondansetron (ZOFRAN ODT) 4 mg disintegrating tablet Take 1 Tab by mouth every eight (8) hours as needed for Nausea. 10 Tab 0    LORazepam (ATIVAN) 0.5 mg tablet TAKE 1 TABLET BY MOUTH ONCE DAILY AS NEEDED 30 Tab 0    cholecalciferol, VITAMIN D3, (VITAMIN D3) 5,000 unit tab tablet Take 1 Tab by mouth daily. 90 Tab 1    clomiPHENE (CLOMID) 50 mg tablet One pill every other day 15 Tab 3    lidocaine, PF, (XYLOCAINE) 10 mg/mL (1 %) injection Given in office 1 Vial 0    chlorthalidone (HYGROTEN) 25 mg tablet Take 1 Tab by mouth daily. 90 Tab 1    lisinopril (PRINIVIL, ZESTRIL) 10 mg tablet TAKE 1 TABLET BY MOUTH ONCE DAILY 90 Tab 3    galcanezumab-gnlm (EMGALITY PEN) 120 mg/mL injection 1 mL by SubCUTAneous route every thirty (30) days. 1 mL 11    cyclobenzaprine (FLEXERIL) 10 mg tablet Take 10 mg by mouth three (3) times daily as needed for Muscle Spasm(s).  aspirin delayed-release 81 mg tablet Take 1 Tab by mouth daily. 30 Tab 0    butalbital-acetaminophen-caffeine (FIORICET, ESGIC) -40 mg per tablet TAKE 1 TABLET BY MOUTH AT THE ONSET OF HEADACHE. CAN TAKE TWICE DAILY BUT NOT MORE THAN 10 DAYS/MONTH.ONLY TO BE FILLED IN 1 MONTH INTERVALS 20 Tab 2    busPIRone (BUSPAR) 15 mg tablet Take 1 Tab by mouth three (3) times daily. (Patient taking differently: Take 15 mg by mouth two (2) times a day.) 90 Tab 3    sertraline (ZOLOFT) 100 mg tablet Take 1 Tab by mouth daily.  metFORMIN (GLUCOPHAGE) 500 mg tablet TAKE 1 TABLET BY MOUTH TWICE DAILY WITH MEALS 180 Tab 3    insulin NPH (NOVOLIN N NPH U-100 INSULIN) 100 unit/mL injection by SubCUTAneous route once. 80 units twice a day      levothyroxine (SYNTHROID) 112 mcg tablet TAKE 1 TABLET BY MOUTH ONCE DAILY BEFORE BREAKFAST 90 Tab 3    fenofibrate (LOFIBRA) 160 mg tablet TAKE 1 TABLET BY MOUTH ONCE DAILY 90 Tab 3    rosuvastatin (CRESTOR) 20 mg tablet Take 1 Tab by mouth nightly.  90 Tab 3    insulin regular (NOVOLIN R, HUMULIN R) 100 unit/mL injection 15-20 units TID AC (Patient taking differently: 10 units 2-3 times per day  Indications: 0-20u BID, per pt) 1 Vial 12       Review of Systems   Review of Systems   Constitutional: Positive for diaphoresis. Negative for chills and fever. HENT: Negative. Negative for congestion, facial swelling, rhinorrhea, sore throat, trouble swallowing and voice change. Eyes: Negative. Respiratory: Positive for chest tightness and shortness of breath. Negative for apnea, cough and wheezing. Cardiovascular: Positive for chest pain. Negative for palpitations and leg swelling. Gastrointestinal: Negative. Negative for abdominal distention, abdominal pain, blood in stool, constipation, diarrhea, nausea and vomiting. Endocrine: Negative. Negative for cold intolerance, heat intolerance and polyuria. Genitourinary: Negative. Negative for difficulty urinating, dysuria, flank pain, frequency, hematuria and urgency. Musculoskeletal: Negative. Negative for arthralgias, back pain, myalgias, neck pain and neck stiffness. Skin: Negative. Negative for color change and rash. Neurological: Positive for dizziness and light-headedness. Negative for syncope, facial asymmetry, speech difficulty, weakness, numbness and headaches. Hematological: Negative. Does not bruise/bleed easily. Psychiatric/Behavioral: Negative. Negative for confusion and self-injury. The patient is not nervous/anxious. Physical Exam   Physical Exam  Vitals signs and nursing note reviewed. Constitutional:       General: He is in acute distress. Appearance: He is well-developed. He is ill-appearing, toxic-appearing and diaphoretic. HENT:      Head: Normocephalic and atraumatic. Mouth/Throat:      Pharynx: No posterior oropharyngeal erythema. Eyes:      Conjunctiva/sclera: Conjunctivae normal.      Pupils: Pupils are equal, round, and reactive to light.    Neck:      Musculoskeletal: Normal range of motion. Cardiovascular:      Rate and Rhythm: Normal rate and regular rhythm. Heart sounds: Normal heart sounds. No murmur. No friction rub. No gallop. Pulmonary:      Effort: Pulmonary effort is normal. No respiratory distress. Breath sounds: Normal breath sounds. No wheezing or rales. Chest:      Chest wall: No tenderness. Abdominal:      General: Bowel sounds are normal. There is no distension. Palpations: Abdomen is soft. There is no mass. Tenderness: There is no tenderness. There is no guarding or rebound. Musculoskeletal: Normal range of motion. General: No tenderness or deformity. Skin:     General: Skin is warm. Findings: No rash. Neurological:      Mental Status: He is alert and oriented to person, place, and time. Cranial Nerves: No cranial nerve deficit. Motor: No abnormal muscle tone. Coordination: Coordination normal.      Deep Tendon Reflexes: Reflexes normal.   Psychiatric:         Behavior: Behavior is cooperative. Diagnostic Study Results     Labs -  Recent Results (from the past 24 hour(s))   CBC WITH AUTOMATED DIFF    Collection Time: 11/28/19  2:31 AM   Result Value Ref Range    WBC 11.3 (H) 4.1 - 11.1 K/uL    RBC 5.09 4. 10 - 5.70 M/uL    HGB 14.7 12.1 - 17.0 g/dL    HCT 44.5 36.6 - 50.3 %    MCV 87.4 80.0 - 99.0 FL    MCH 28.9 26.0 - 34.0 PG    MCHC 33.0 30.0 - 36.5 g/dL    RDW 13.6 11.5 - 14.5 %    PLATELET 692 890 - 018 K/uL    MPV 10.5 8.9 - 12.9 FL    NRBC 0.0 0  WBC    ABSOLUTE NRBC 0.00 0.00 - 0.01 K/uL    NEUTROPHILS 52 32 - 75 %    LYMPHOCYTES 36 12 - 49 %    MONOCYTES 7 5 - 13 %    EOSINOPHILS 3 0 - 7 %    BASOPHILS 1 0 - 1 %    IMMATURE GRANULOCYTES 1 (H) 0.0 - 0.5 %    ABS. NEUTROPHILS 5.9 1.8 - 8.0 K/UL    ABS. LYMPHOCYTES 4.1 (H) 0.8 - 3.5 K/UL    ABS. MONOCYTES 0.8 0.0 - 1.0 K/UL    ABS. EOSINOPHILS 0.3 0.0 - 0.4 K/UL    ABS. BASOPHILS 0.1 0.0 - 0.1 K/UL    ABS. IMM.  GRANS. 0.2 (H) 0.00 - 0.04 K/UL    DF AUTOMATED     METABOLIC PANEL, COMPREHENSIVE    Collection Time: 11/28/19  2:31 AM   Result Value Ref Range    Sodium 137 136 - 145 mmol/L    Potassium 3.8 3.5 - 5.1 mmol/L    Chloride 103 97 - 108 mmol/L    CO2 27 21 - 32 mmol/L    Anion gap 7 5 - 15 mmol/L    Glucose 164 (H) 65 - 100 mg/dL    BUN 19 6 - 20 MG/DL    Creatinine 1.33 (H) 0.70 - 1.30 MG/DL    BUN/Creatinine ratio 14 12 - 20      GFR est AA >60 >60 ml/min/1.73m2    GFR est non-AA 60 (L) >60 ml/min/1.73m2    Calcium 9.4 8.5 - 10.1 MG/DL    Bilirubin, total 0.4 0.2 - 1.0 MG/DL    ALT (SGPT) 47 12 - 78 U/L    AST (SGOT) 21 15 - 37 U/L    Alk.  phosphatase 66 45 - 117 U/L    Protein, total 7.4 6.4 - 8.2 g/dL    Albumin 3.8 3.5 - 5.0 g/dL    Globulin 3.6 2.0 - 4.0 g/dL    A-G Ratio 1.1 1.1 - 2.2     CK W/ REFLX CKMB    Collection Time: 11/28/19  2:31 AM   Result Value Ref Range    CK 94 39 - 308 U/L   TROPONIN I    Collection Time: 11/28/19  2:31 AM   Result Value Ref Range    Troponin-I, Qt. <0.05 <0.05 ng/mL   NT-PRO BNP    Collection Time: 11/28/19  2:31 AM   Result Value Ref Range    NT pro-BNP <5 <125 PG/ML   POC TROPONIN-I    Collection Time: 11/28/19  2:31 AM   Result Value Ref Range    Troponin-I (POC) <0.04 0.00 - 0.08 ng/mL   D DIMER    Collection Time: 11/28/19  4:25 AM   Result Value Ref Range    D-dimer <0.19 0.00 - 0.65 mg/L FEU   EKG, 12 LEAD, INITIAL    Collection Time: 11/28/19  6:02 AM   Result Value Ref Range    Ventricular Rate 90 BPM    Atrial Rate 90 BPM    P-R Interval 162 ms    QRS Duration 90 ms    Q-T Interval 330 ms    QTC Calculation (Bezet) 403 ms    Calculated P Axis 40 degrees    Calculated R Axis 12 degrees    Calculated T Axis 60 degrees    Diagnosis       Normal sinus rhythm  Possible Inferior infarct , age undetermined  Anterior infarct , age undetermined  Abnormal ECG  When compared with ECG of 28-NOV-2019 02:26,  MANUAL COMPARISON REQUIRED, DATA IS UNCONFIRMED     SAMPLES BEING HELD    Collection Time: 11/28/19  6:10 AM   Result Value Ref Range    SAMPLES BEING HELD 1SST     COMMENT        Add-on orders for these samples will be processed based on acceptable specimen integrity and analyte stability, which may vary by analyte. Radiologic Studies -   XR CHEST PORT   Final Result   IMPRESSION: No acute process. CT Results  (Last 48 hours)    None        CXR Results  (Last 48 hours)               11/28/19 0239  XR CHEST PORT Final result    Impression:  IMPRESSION: No acute process. Narrative:  EXAM:  CR chest portable       INDICATION: Chest pain       COMPARISON: 11/26/2019. TECHNIQUE: Portable AP upright chest view at 0216 hours       FINDINGS: Cardiac monitoring wires overlie the thorax. The cardiomediastinal   contours are stable. The lungs and pleural spaces are clear. There is no   pneumothorax. The bones and upper abdomen are stable. 11/26/19 1411  XR CHEST PA LAT Final result    Impression:  IMPRESSION: Evidence of pulmonary hyperaeration. Narrative:  Chest 2 views dated 11/26/2019       Comparison chest dated 9/23/2019       History is pain       PA and lateral views of the chest were obtained. It is difficult to accurately   assess the size of the cardiac silhouette. There is hyperaeration of the lungs. No areas of lobar consolidation are identified. Medical Decision Making   I am the first provider for this patient. I reviewed the vital signs, available nursing notes, past medical history, past surgical history, family history and social history. Vital Signs-Reviewed the patient's vital signs.   Patient Vitals for the past 24 hrs:   Temp Pulse Resp BP SpO2   11/28/19 0515 98.6 °F (37 °C) 93 16 143/55 94 %   11/28/19 0500 -- 98 15 126/68 96 %   11/28/19 0445 -- 92 10 131/70 96 %   11/28/19 0430 -- 97 12 142/67 95 %   11/28/19 0415 -- 97 15 135/64 94 %   11/28/19 0400 98.6 °F (37 °C) 100 20 136/77 94 %   11/28/19 0345 -- 95 14 126/65 93 %   11/28/19 0330 -- 96 12 141/78 95 %   11/28/19 0315 -- (!) 101 12 137/63 97 %   11/28/19 0300 -- (!) 102 13 145/62 96 %   11/28/19 0245 -- 98 17 143/66 95 %   11/28/19 0230 -- (!) 107 17 161/73 --   11/28/19 0223 98.8 °F (37.1 °C) (!) 102 16 143/66 96 %     Pulse Oximetry Analysis - 96% on RA    Cardiac Monitor:   Rate: 102 bpm  Rhythm: Sinus Tachycardia      ED EKG interpretation:  Rhythm: normal sinus rhythm; and regular . Rate (approx.): 90; Axis: normal; P wave: normal; QRS interval: normal ; ST/T wave: non-specific changes; Other findings: borderline ekg. This EKG was interpreted by Marcial Dinero M.D. Records Reviewed: Nursing Notes, Old Medical Records, Previous electrocardiograms, Previous Radiology Studies and Previous Laboratory Studies    Provider Notes (Medical Decision Making):   DDx includes STEMI, NSTEMI, Angina, PE, Aortic Pathology, Chest Wall Pain, Pleurisy, Pneumonia, GERD/esophagitis, Anxiety. No cough/fever or focal lung findings to suggest pneumonia. No tachycardia, hypoxia or pleuritic component to suggest PE. Pulses symmetric and no extremely elevated BP/asymmetry or classic tearing sensation to suggest Aortic Dissection. Also, no neuro findings. No wretching/forceful vomiting to suggest esophageal disaster. Denies IV drug abuse, has native valves, no fevers/murmurs or skin lesions to suggest endocarditis. Will evaluate with EKG, labs, cardiac enzymes, chest x-ray. Will provide pain control and reassess. ED Course:   Initial assessment performed. The patients presenting problems have been discussed, and they are in agreement with the care plan formulated and outlined with them. I have encouraged them to ask questions as they arise throughout their visit. HYPERTENSION COUNSELING   Education was provided to the patient today regarding their hypertension.  Patient is made aware of their elevated blood pressure and is instructed to follow up this week with their Primary Care for a recheck. Patient is counseled regarding consequences of chronic, uncontrolled hypertension including kidney disease, heart disease, stroke or even death. Patient states their understanding and agrees to follow up this week. Additionally, during their visit, I discussed sodium restriction, maintaining ideal body weight and regular exercise program as physiologic means to achieve blood pressure control. The patient will strive towards this. I reviewed our electronic medical record system for any past medical records that were available that may contribute to the patient's current condition, the nursing notes and vital signs from today's visit. Dulce Treadwell MD    ED Orders Placed :  Orders Placed This Encounter    XR CHEST PORT    CBC WITH AUTOMATED DIFF    METABOLIC PANEL, COMPREHENSIVE    CK W/ REFLX CKMB    TROPONIN I (Utilize POC if available)    NT-PRO BNP    D DIMER    TROPONIN I    METABOLIC PANEL, BASIC    CBC W/O DIFF    CK W/ CKMB & INDEX    SAMPLES BEING HELD    DIET NPO    CHEST PAIN PANEL TRACKING (DO NOT DESELECT)    Cardiac Monitor    VITAL SIGNS    VITAL SIGNS PER UNIT ROUTINE    BEDREST, COMPLETE    NOTIFY PROVIDER:  ADMIT STATUS    NOTIFY PROVIDER: VITAL SIGNS CHANGES    INTAKE AND OUTPUT    APPLY/MAINTAIN SEQUENTIAL COMPRESSION DEVICE    NURSING-MISCELLANEOUS: Initiate Hypoglycemia protocol if blood glucose is less than 80 mg/dL CONTINUOUS    NURSING-MISCELLANEOUS: FOR CONSCIOUS PATIENT: Administer 4 ounces fruit juice OR regular soda OR 4 glucose tablets. CONTINUOUS    NURSING-MISCELLANEOUS: If patient NPO, unconscious or unable to swallow and If Blood Glucose between 60 and 80 mg/dL: Follow instructions below IF PATIENT NPO, UNCONSCIOUS OR UNABLE TO SWALLOW AND IF BLOOD GLUCOSE BETWEEN 60 AND 80 MG/DL: GIVE 25 . ..     NURSING-MISCELLANEOUS: Repeat finger stick blood glucose in 15 minutes AFTER treatment, if less than 80 repeat hypoglycemic protocol and notify provider. CONTINUOUS    NURSING-MISCELLANEOUS: Following Hypoglycemic Protocol: Once patient is fully alert and BG greater than 80 provide a small snack,  if NPO consider IV fluids with dextrose. CONTINUOUS    NURSING-MISCELLANEOUS: Document all interventions in the Electronic Medical  Record (EMR).  CONTINUOUS    NURSING-MISCELLANEOUS: Blood glucose targets -- ICU: 140 - 180 mg/dL;  NON-ICU: 100 - 180 mg/dL CONTINUOUS    FULL CODE    POC TROPONIN-I    EKG 12 LEAD INITIAL    SALINE LOCK IV ONE TIME STAT    fentaNYL citrate (PF) injection 100 mcg    nitroglycerin (NITROBID) 2 % ointment 1 Inch    DISCONTD: nitroglycerin (Tridil) 200 mcg/ml infusion    fentaNYL citrate (PF) injection 100 mcg    sodium chloride (NS) flush 5-40 mL    sodium chloride (NS) flush 5-40 mL    ondansetron (ZOFRAN) injection 4 mg    bisacodyl (DULCOLAX) tablet 5 mg    enoxaparin (LOVENOX) injection 40 mg    glucose chewable tablet 16 g    dextrose (D50W) injection syrg 12.5-25 g    glucagon (GLUCAGEN) injection 1 mg    insulin lispro (HUMALOG) injection    IP CONSULT TO HOSPITALIST    IP CONSULT TO CARDIOLOGY    INITIAL PHYSICIAN ORDER: OBSERVATION/OUTPATIENT IN A BED OBSERVATION; Telemetry; chest pain     ED Medications Administered:  Medications   0.9% sodium chloride infusion (0 mL/hr IntraVENous Transfusion Completed 11/29/19 1600)   0.9% sodium chloride infusion (0 mL/hr IntraVENous Stopped 12/2/19 1557)   fentaNYL citrate (PF) injection 100 mcg (100 mcg IntraVENous Given 11/28/19 0305)   nitroglycerin (NITROBID) 2 % ointment 1 Inch (1 Inch Topical Given 11/28/19 0306)   fentaNYL citrate (PF) injection 100 mcg (100 mcg IntraVENous Given 11/28/19 0424)   insulin lispro (HUMALOG) injection 12 Units (12 Units SubCUTAneous Given 11/29/19 0802)   heparinized saline 2 units/mL infusion (500 mL Irrigation New Bag 11/29/19 0904)   heparinized saline 2 units/mL infusion (500 mL Irrigation New Bag 11/29/19 0905)   heparinized saline 2 units/mL infusion (500 mL Irrigation New Bag 11/29/19 0905)   atropine injection 0.1 mg (0.1 mg IntraVENous Given 11/29/19 1141)   insulin lispro (HUMALOG) injection 15 Units (15 Units SubCUTAneous Given 11/30/19 1259)   potassium chloride SR (KLOR-CON 10) tablet 40 mEq (40 mEq Oral Given 12/1/19 0843)   heparinized saline 2 units/mL infusion (500 mL Irrigation New Bag 12/2/19 0934)   heparinized saline 2 units/mL infusion (500 mL Irrigation New Bag 12/2/19 0935)   heparinized saline 2 units/mL infusion (500 mL Irrigation New Bag 12/2/19 0935)   acetaminophen (TYLENOL) tablet 650 mg (650 mg Oral Given 12/3/19 0910)         Progress Note:  Pt reports improvement of pain after IV Fentanyl and nitro paste, now down to 3/10. Progress Note:  Pt's pain has resumed, states now up to 9/10, will place on nitro gtt. Consult Cards, will consider anticoagulation. Pt will need to be admitted for further f/u, likely cath at this point for unstable angina. Consult Note:  Barbara Siemens, MD spoke with Dr. Army Andrade,   Specialty: Cardiology  Discussed pt's hx, disposition, and available diagnostic and imaging results. Reviewed care plans. Agree with management and plan thus far. Consultant recommends trending cardiac enzymes, keep NPO for possible cath in AM. Dr. Antony Garcia to see patient in AM.    Progress Note:  I have just re-evaluated the patient. Patient reports resolution of pain   I have reviewed His vital signs and determined there is currently no worsening in their condition or physical exam. Results have been reviewed with them and their questions have been answered. We will continue to review further results as they come available. Consult Note:  Barbara Siemens, MD spoke with Dr. Mandie Tubbs,   Specialty: Hospitalist  Discussed pt's hx, disposition, and available diagnostic and imaging results. Reviewed care plans. Agree with management and plan thus far. Consultant will evaluate pt for admission.     CRITICAL CARE NOTE Yogesh Mcnamara IMPENDING DETERIORATION -Airway, Respiratory, Cardiovascular, Metabolic and Renal  ASSOCIATED RISK FACTORS - Hypotension, Shock, Hypoxia, Dysrhythmia and Metabolic changes  MANAGEMENT- Bedside Assessment and Supervision of Care  INTERPRETATION -  Xrays, ECG, Blood Pressure and Cardiac Output Measures   INTERVENTIONS - hemodynamic mngmt and Metobolic interventions  CASE REVIEW - Hospitalist, Medical Sub-Specialist, Nursing and Family  TREATMENT RESPONSE -Improved  PERFORMED BY - Self    NOTES   :    I have spent 55 minutes of critical care time involved in lab review, consultations with specialist, family decision- making, bedside attention and documentation. During this entire length of time I was immediately available to the patient . Critical Care: The reason for providing this level of medical care for this critically ill patient was due to a critical illness that impaired one or more vital organ systems, such that there was a high probability of imminent or life threatening deterioration in the patient's condition. This care involved high complexity decision making to assess, manipulate, and support vital system functions, to treat this degree of vital organ system failure, and to prevent further life threatening deterioration of the patients condition. Billy Oquendo MD    Disposition: ADMIT  Patient is being admitted to the hospital. The results of their tests and reasons for their admission have been discussed with them and/or available family. I have discussed the case with the admitting specialist and expressed my high concern for decompensation if patient is discharged from the ED. The patient and/or available family convey agreement and understanding for the need to be admitted and for their admission diagnosis. Consultation has been made with the inpatient physician specialist for hospitalization. Diagnosis     Clinical Impression:   1. Unstable angina ([address]    2.  ACS (acute coronary syndrome) ([address]    3. Acute chest pain    4. Hypertension goal BP (blood pressure) < 130/80    5. Accelerated hypertension    6. SERGE (acute kidney injury) ([address]        Attestation:  I personally performed the services described in this documentation on this date 11/28/2019 for patient, Kendell Gonsales. Junior Michaela MD      This note will not be viewable in [address]

## 2019-11-28 NOTE — PROGRESS NOTES
Chest pain rule out acute current syndrome Admit patient to telemetry 99follow-up serial troponincardiology consultationstart patient on aspirinnitroglycerin as needed Echocardiogram 
 
Hypertensioncontinue home antihypertensive medication DMstart patient sliding scale with insulin Hyperlipidemia Continue home medication 35years old male from home with past medical history significant for hypertension,  hyperlipidemia, DM presented to the hospital for evaluation of chest pain radiated to the left started today, patient stated he been complaining from chest pain for the month and he was seen by cardiologist and patient had stress test done for him as outpatient and it was abnormal and patient was scheduled for cardiac cath next week, patient denies any nausea any vomiting any shortness of breath.

## 2019-11-28 NOTE — Clinical Note
Lesion: Located in the Proximal LAD. Stent inserted. Stent deployed. Single technique used. First inflation pressure = 14 greg; inflation time: 15 sec.

## 2019-11-28 NOTE — Clinical Note
Lesion located in the Mid RCA. Balloon inflated using multiple inflations inflation technique. Pressure = 8 greg; Duration = 14 sec. Inflation 2: Pressure: 10 greg; Duration: 14 sec.

## 2019-11-28 NOTE — Clinical Note
Lesion: Located in the Mid RCA. Stent deployed. Single technique used. First inflation pressure = 18 greg; inflation time: 18 sec.

## 2019-11-28 NOTE — Clinical Note
Lesion: Located in the Distal RCA. Stent deployed. Multiple inflations used. First inflation pressure = 12 greg; inflation time: 14 sec. Second inflation pressure: 15 greg; inflation time: 10 sec.

## 2019-11-28 NOTE — PROGRESS NOTES
1330: Approached by patient's family member who said \"He needs to see you, he isn't feeling very well. \" Patient laying in bed. States he is experiencing chest pain 8/10. VSS. Visit Vitals  /55   Pulse 74   Temp 97.9 °F (36.6 °C)   Resp 20   Ht 5' 9\" (1.753 m)   Wt 116.5 kg (256 lb 13.4 oz)   SpO2 97%   BMI 37.93 kg/m²     SL nitroglycerin administered x1. Patient states pain now 3.5/10. BP 94/50.    1540: Bedside shift report given to KG Rockwell. SBAR and Kardex discussed. room air

## 2019-11-28 NOTE — Clinical Note
Lesion located in the Proximal RCA. Balloon inflated using multiple inflations inflation technique. Pressure = 8 greg; Duration = 12 sec. Inflation 2: Pressure: 12 greg; Duration: 18 sec. Inflation 3: Pressure: 12 greg; Duration: 10 sec.

## 2019-11-28 NOTE — PROGRESS NOTES
Awaiting return call form Dr Nori Brock concerning patient c/o chest pain.  Requesting a PRN pain medication

## 2019-11-28 NOTE — Clinical Note
TRANSFER - OUT REPORT:     Verbal report given to: Ruth. Report consisted of patient's Situation, Background, Assessment and   Recommendations(SBAR). Opportunity for questions and clarification was provided. Patient transported with a Registered Nurse. Patient transported to: [facility].

## 2019-11-28 NOTE — Clinical Note
Lesion located in the Distal RCA. Balloon inflated using multiple inflations inflation technique. Pressure = 12 greg; Duration = 10 sec. Inflation 2: Pressure: 12 greg; Duration: 10 sec.

## 2019-11-28 NOTE — Clinical Note
Lesion located in the Mid RCA. Balloon inflated using multiple inflations inflation technique. Pressure = 15 greg; Duration = 10 sec. Inflation 2: Pressure: 15 greg; Duration: 10 sec. Inflation 3: Pressure: 15 greg; Duration: 10 sec.

## 2019-11-28 NOTE — Clinical Note
Lesion located in the Mid LAD. Balloon inserted. Balloon inflated using multiple inflations inflation technique. Pressure = 8 greg; Duration = 10 sec. Inflation 2: Pressure: 8 greg; Duration: 10 sec. Inflation 3: Pressure: 8 greg; Duration: 10 sec.

## 2019-11-28 NOTE — PROGRESS NOTES
Problem: Pain  Goal: *Control of Pain  Outcome: Progressing Towards Goal     Problem: Falls - Risk of  Goal: *Absence of Falls  Description  Document David Bah Fall Risk and appropriate interventions in the flowsheet.   Outcome: Progressing Towards Goal  Note: Fall Risk Interventions:            Medication Interventions: Patient to call before getting OOB

## 2019-11-28 NOTE — Clinical Note
Lesion located in the Proximal RCA. Balloon inflated using single inflation technique. Pressure = 15 greg; Duration = 10 sec.

## 2019-11-28 NOTE — PROGRESS NOTES
chart reviewed , agree with H&P   Cath tomorrow per cardio  2D echo pending   Continue with current management  Non billable round

## 2019-11-28 NOTE — H&P
Hospitalist Admission Note    NAME: Bridgett Canela   :  1980   MRN:  [id number]     Date/Time:  2019 5:17 AM    Patient PCP: Angi Khan MD  ______________________________________________________________________  Given the patient's current clinical presentation, I have a high level of concern for decompensation if discharged from the emergency department. Complex decision making was performed, which includes reviewing the patient's available past medical records, laboratory results, and x-ray films. My assessment of this patient's clinical condition and my plan of care is as follows. Assessment / Plan:    Chest pain rule out acute coronary  syndrome  -Admit patient to telemetry   -follow-up serial troponin  -cardiology consultation  -start patient on aspirin  -nitroglycerin as needed  -Echocardiogram    Hypertension  -continue home antihypertensive medication    DM  -start patient sliding scale with insulin    Hyperlipidemia  -Continue home medication      Code Status: Full   Surrogate Decision Maker:Arnaud Cervantes    DVT Prophylaxis: Lovenox   GI Prophylaxis: not indicated          Subjective:   CHIEF COMPLAINT: chest pain    HISTORY OF PRESENT ILLNESS:     35years old male from home with past medical history significant for hypertension, hyperlipidemia, DM presented to the hospital for evaluation of chest pain radiated to the left started today, patient stated he been complaining from chest pain for the month and he was seen by cardiologist and patient had stress test done for him as outpatient and it was abnormal and patient was scheduled for cardiac cath next week, patient denies any nausea any vomiting any shortness of breath. We were asked to admit for work up and evaluation of the above problems.      Past Medical History:   Diagnosis Date    Anxiety     Chronic headaches     Depression     Diabetes (HCC)     GERD (gastroesophageal reflux disease)     Hypercholesterolemia     Hypertension     Thyroid disease     hypothyroid    Unspecified sleep apnea     does not use CPAP        Past Surgical History:   Procedure Laterality Date    HX CHOLECYSTECTOMY  14    Dr Amber Maciel    HX HEENT      wisdom teeth removed    HX ORTHOPAEDIC Left 2012    carpel tunnel       Social History     Tobacco Use    Smoking status: Former Smoker     Packs/day: 0.00     Years: 14.00     Pack years: 0.00     Last attempt to quit: 2014     Years since quittin.9    Smokeless tobacco: Never Used   Substance Use Topics    Alcohol use: No        Family History   Problem Relation Age of Onset    Diabetes Mother     Heart Disease Father     Cancer Father     Diabetes Father     Diabetes Paternal Aunt     Diabetes Maternal Grandmother     Thyroid Cancer Maternal Grandmother     Kidney Disease Maternal Grandmother     Arthritis Maternal Grandmother     Heart Disease Maternal Grandfather     High Cholesterol Maternal Grandfather     Hypertension Maternal Grandfather     Diabetes Paternal Grandmother     Hemophilia Paternal Grandfather      Allergies   Allergen Reactions    Morphine Nausea and Vomiting    Penicillin G Swelling    Percocet [Oxycodone-Acetaminophen] Hives and Nausea and Vomiting    Sulfa (Sulfonamide Antibiotics) Swelling    Tramadol Nausea Only     Nausea and headache          Prior to Admission medications    Medication Sig Start Date End Date Taking? Authorizing Provider   LORazepam (ATIVAN) 0.5 mg tablet TAKE 1 TABLET BY MOUTH ONCE DAILY AS NEEDED 19   Janelle Hurst MD   cholecalciferol, VITAMIN D3, (VITAMIN D3) 5,000 unit tab tablet Take 1 Tab by mouth daily.  19   Yuliya Higginbotham MD   clomiPHENE (CLOMID) 50 mg tablet One pill every other day 10/30/19   Keesha Nunez MD   lidocaine, PF, (XYLOCAINE) 10 mg/mL (1 %) injection Given in office 10/25/19   Mojgan Martino MD   chlorthalidone (HYGROTEN) 25 mg tablet Take 1 Tab by mouth daily. 10/17/19   Micah Hurst MD   lisinopril (PRINIVIL, ZESTRIL) 10 mg tablet TAKE 1 TABLET BY MOUTH ONCE DAILY 10/17/19   Micah Hurst MD   ondansetron (ZOFRAN ODT) 4 mg disintegrating tablet Take 1 Tab by mouth every eight (8) hours as needed for Nausea. 10/10/19   LOLLY Gallagher   galcanezumab-gnlm (EMGALITY PEN) 120 mg/mL injection 1 mL by SubCUTAneous route every thirty (30) days. 9/25/19   Thomas Castaneda MD   cyclobenzaprine (FLEXERIL) 10 mg tablet Take 10 mg by mouth three (3) times daily as needed for Muscle Spasm(s). Audrey Kay MD   aspirin delayed-release 81 mg tablet Take 1 Tab by mouth daily. 9/24/19   Regine Verduzco MD   butalbital-acetaminophen-caffeine (FIORICET, ESGIC) -40 mg per tablet TAKE 1 TABLET BY MOUTH AT THE ONSET OF HEADACHE. CAN TAKE TWICE DAILY BUT NOT MORE THAN 10 DAYS/MONTH.ONLY TO BE FILLED IN 1 MONTH INTERVALS 9/18/19   Thomas Castaneda MD   busPIRone (BUSPAR) 15 mg tablet Take 1 Tab by mouth three (3) times daily. Patient taking differently: Take 15 mg by mouth two (2) times a day. 9/11/19   Jyoti Heller MD   sertraline (ZOLOFT) 100 mg tablet Take 1 Tab by mouth daily. 8/26/19   Jyoti Heller MD   metFORMIN (GLUCOPHAGE) 500 mg tablet TAKE 1 TABLET BY MOUTH TWICE DAILY WITH MEALS 6/21/19   Susan Ramos MD   insulin NPH (NOVOLIN N NPH U-100 INSULIN) 100 unit/mL injection by SubCUTAneous route once. 80 units twice a day    Provider, Historical   levothyroxine (SYNTHROID) 112 mcg tablet TAKE 1 TABLET BY MOUTH ONCE DAILY BEFORE BREAKFAST 4/29/19   Susan Ramos MD   fenofibrate (LOFIBRA) 160 mg tablet TAKE 1 TABLET BY MOUTH ONCE DAILY 3/25/19   Susan Ramos MD   rosuvastatin (CRESTOR) 20 mg tablet Take 1 Tab by mouth nightly.  3/22/19   Susan Ramos MD   insulin regular (NOVOLIN R, HUMULIN R) 100 unit/mL injection 15-20 units TID [facility]  Patient taking differently: 10 units 2-3 times per day  Indications: 0-20u BID, per pt 12/16/16   Herbert Toure MD       REVIEW OF SYSTEMS:     I am not able to complete the review of systems because: The patient is intubated and sedated    The patient has altered mental status due to his acute medical problems    The patient has baseline aphasia from prior stroke(s)    The patient has baseline dementia and is not reliable historian    The patient is in acute medical distress and unable to provide information           Total of 12 systems reviewed as follows:       POSITIVE= underlined text  Negative = text not underlined  General:  fever, chills, sweats, generalized weakness, weight loss/gain,      loss of appetite   Eyes:    blurred vision, eye pain, loss of vision, double vision  ENT:    rhinorrhea, pharyngitis   Respiratory:   cough, sputum production, SOB, COLE, wheezing, pleuritic pain   Cardiology:   chest pain, palpitations, orthopnea, PND, edema, syncope   Gastrointestinal:  abdominal pain , N/V, diarrhea, dysphagia, constipation, bleeding   Genitourinary:  frequency, urgency, dysuria, hematuria, incontinence   Muskuloskeletal :  arthralgia, myalgia, back pain  Hematology:  easy bruising, nose or gum bleeding, lymphadenopathy   Dermatological: rash, ulceration, pruritis, color change / jaundice  Endocrine:   hot flashes or polydipsia   Neurological:  headache, dizziness, confusion, focal weakness, paresthesia,     Speech difficulties, memory loss, gait difficulty  Psychological: Feelings of anxiety, depression, agitation    Objective:   VITALS:    Visit Vitals  /55   Pulse 93   Temp 98.6 °F (37 °C)   Resp 16   Ht 5' 9\" (1.753 m)   Wt 117.9 kg (260 lb)   SpO2 94%   BMI 38.40 kg/m²       PHYSICAL EXAM:    General:    Alert, cooperative, no distress, appears stated age.      HEENT: Atraumatic, anicteric sclerae, pink conjunctivae     No oral ulcers, mucosa moist, throat clear, dentition fair  Neck:  Supple, symmetrical,  thyroid: non tender  Lungs:   Clear to auscultation bilaterally. No Wheezing or Rhonchi. No rales. Chest wall:  No tenderness  No Accessory muscle use. Heart:   Regular  rhythm,  No  murmur   No edema  Abdomen:   Soft, non-tender. Not distended. Bowel sounds normal  Extremities: No cyanosis. No clubbing,      Skin turgor normal, Capillary refill normal, Radial dial pulse 2+  Skin:     Not pale. Not Jaundiced  No rashes   Psych:  Good insight. Not depressed. Not anxious or agitated. Neurologic: EOMs intact. No facial asymmetry. No aphasia or slurred speech. Symmetrical strength, Sensation grossly intact. Alert and oriented X 4.     _______________________________________________________________________  Care Plan discussed with:    Comments   Patient y    Family      RN y    Care Manager                    Consultant:      _______________________________________________________________________  Expected  Disposition:   Home with Family y   HH/PT/OT/RN    SNF/LTC    ZEUS    ________________________________________________________________________  TOTAL TIME:  61  Minutes    Critical Care Provided     Minutes non procedure based      Comments    y Reviewed previous records   >50% of visit spent in counseling and coordination of care y Discussion with patient and/or family and questions answered       ________________________________________________________________________  Signed: Brenda Huffman MD    Procedures: see electronic medical records for all procedures/Xrays and details which were not copied into this note but were reviewed prior to creation of Plan. LAB DATA REVIEWED:    Recent Results (from the past 24 hour(s))   CBC WITH AUTOMATED DIFF    Collection Time: 11/28/19  2:31 AM   Result Value Ref Range    WBC 11.3 (H) 4.1 - 11.1 K/uL    RBC 5.09 4. 10 - 5.70 M/uL    HGB 14.7 12.1 - 17.0 g/dL    HCT 44.5 36.6 - 50.3 %    MCV 87.4 80.0 - 99.0 FL    MCH 28.9 26.0 - 34.0 PG    MCHC 33.0 30.0 - 36.5 g/dL    RDW 13.6 11.5 - 14.5 %    PLATELET 481 819 - 658 K/uL    MPV 10.5 8.9 - 12.9 FL    NRBC 0.0 0  WBC    ABSOLUTE NRBC 0.00 0.00 - 0.01 K/uL    NEUTROPHILS 52 32 - 75 %    LYMPHOCYTES 36 12 - 49 %    MONOCYTES 7 5 - 13 %    EOSINOPHILS 3 0 - 7 %    BASOPHILS 1 0 - 1 %    IMMATURE GRANULOCYTES 1 (H) 0.0 - 0.5 %    ABS. NEUTROPHILS 5.9 1.8 - 8.0 K/UL    ABS. LYMPHOCYTES 4.1 (H) 0.8 - 3.5 K/UL    ABS. MONOCYTES 0.8 0.0 - 1.0 K/UL    ABS. EOSINOPHILS 0.3 0.0 - 0.4 K/UL    ABS. BASOPHILS 0.1 0.0 - 0.1 K/UL    ABS. IMM. GRANS. 0.2 (H) 0.00 - 0.04 K/UL    DF AUTOMATED     METABOLIC PANEL, COMPREHENSIVE    Collection Time: 11/28/19  2:31 AM   Result Value Ref Range    Sodium 137 136 - 145 mmol/L    Potassium 3.8 3.5 - 5.1 mmol/L    Chloride 103 97 - 108 mmol/L    CO2 27 21 - 32 mmol/L    Anion gap 7 5 - 15 mmol/L    Glucose 164 (H) 65 - 100 mg/dL    BUN 19 6 - 20 MG/DL    Creatinine 1.33 (H) 0.70 - 1.30 MG/DL    BUN/Creatinine ratio 14 12 - 20      GFR est AA >60 >60 ml/min/1.73m2    GFR est non-AA 60 (L) >60 ml/min/1.73m2    Calcium 9.4 8.5 - 10.1 MG/DL    Bilirubin, total 0.4 0.2 - 1.0 MG/DL    ALT (SGPT) 47 12 - 78 U/L    AST (SGOT) 21 15 - 37 U/L    Alk.  phosphatase 66 45 - 117 U/L    Protein, total 7.4 6.4 - 8.2 g/dL    Albumin 3.8 3.5 - 5.0 g/dL    Globulin 3.6 2.0 - 4.0 g/dL    A-G Ratio 1.1 1.1 - 2.2     CK W/ REFLX CKMB    Collection Time: 11/28/19  2:31 AM   Result Value Ref Range    CK 94 39 - 308 U/L   TROPONIN I    Collection Time: 11/28/19  2:31 AM   Result Value Ref Range    Troponin-I, Qt. <0.05 <0.05 ng/mL   NT-PRO BNP    Collection Time: 11/28/19  2:31 AM   Result Value Ref Range    NT pro-BNP <5 <125 PG/ML   POC TROPONIN-I    Collection Time: 11/28/19  2:31 AM   Result Value Ref Range    Troponin-I (POC) <0.04 0.00 - 0.08 ng/mL

## 2019-11-28 NOTE — CONSULTS
Consult/Admission    NAME: Cindy Joyce   :  1980   MRN:  [id number]     Date/Time:  2019 8:09 AM    Patient PCP: Anne Simental MD  ________________________________________________________________________     Assessment:     Unstable Angina   HTN  Hyperlipidemia  Diabetes type 1  - onset age 8. Obesity   Family hx of CAD / MI. DJD   ? Cervical disc disease.    Has been on short term disability due to dizziness and lightheadedness. His Father was just in hospital in October and had PCI . Plan:     Anti anginal meds     Aspirin, B Blocker ,  Nitro paste. Cath tomorrow, sooner if he becomes  unstable. [x]           High complexity decision making was performed        Subjective:   CHIEF COMPLAINT: chest pain   HISTORY OF PRESENT ILLNESS:     Fracisco Sanderson is a [address]  male who has above diagnoses. Has been having sxs for the last month, with precordial chest pain , dizziness. Not particularly related to exertion. No radiation. Saw Dr Jose Anguiano earlier this week,  Had an abnormal stress echo and scheduled for cath next week. During the night had severe aching in the chest , sweating. Called EMS and brought in. No acute EKG change. Initial Trop normal     Just some minor discomfort at this time.            Past Medical History:   Diagnosis Date    Anxiety     Chronic headaches     Depression     Diabetes ([address]     GERD (gastroesophageal reflux disease)     Hypercholesterolemia     Hypertension     Thyroid disease     hypothyroid    Unspecified sleep apnea     does not use CPAP      Past Surgical History:   Procedure Laterality Date    HX CHOLECYSTECTOMY  14    Dr Suzie Lester    HX HEENT      wisdom teeth removed    HX ORTHOPAEDIC Left 2012    carpel tunnel     Allergies   Allergen Reactions    Morphine Nausea and Vomiting    Penicillin G Swelling    Percocet [Oxycodone-Acetaminophen] Hives and Nausea and Vomiting    Sulfa (Sulfonamide Antibiotics) Swelling    Tramadol Nausea Only     Nausea and headache        Meds:  See below  Social History     Tobacco Use    Smoking status: Former Smoker     Packs/day: 0.00     Years: 14.00     Pack years: 0.00     Last attempt to quit: 2014     Years since quittin.9    Smokeless tobacco: Never Used   Substance Use Topics    Alcohol use: No      Family History   Problem Relation Age of Onset    Diabetes Mother     Heart Disease Father     Cancer Father     Diabetes Father     Diabetes Paternal Aunt     Diabetes Maternal Grandmother     Thyroid Cancer Maternal Grandmother     Kidney Disease Maternal Grandmother     Arthritis Maternal Grandmother     Heart Disease Maternal Grandfather     High Cholesterol Maternal Grandfather     Hypertension Maternal Grandfather     Diabetes Paternal Grandmother     Hemophilia Paternal Grandfather        REVIEW OF SYSTEMS:     []            Unable to obtain  ROS due to ---   [x]            Total of 12 systems reviewed as follows:    Constitutional: negative fever, negative chills, negative weight loss  Eyes:   negative visual changes  ENT:   negative sore throat, tongue or lip swelling  Respiratory:  negative cough, negative dyspnea  Cards:  negative for chest pain, palpitations, lower extremity edema  GI:   negative for nausea, vomiting, diarrhea, and abdominal pain  Genitourinary: negative for frequency, dysuria  Integument:  negative for rash   Hematologic:  negative for easy bruising and gum/nose bleeding  Musculoskel: negative for myalgias,  back pain  Neurological:  negative for headaches, dizziness, vertigo, weakness  Behavl/Psych: negative for feelings of anxiety, depression     Pertinent Positives include :    Objective:      Physical Exam:    Last 24hrs VS reviewed since prior progress note.  Most recent are:    Visit Vitals  /64 (BP 1 Location: Right arm, BP Patient Position: At rest)   Pulse 93   Temp 98 °F (36.7 °C)   Resp 16   Ht 5' 9\" (1.753 m)   Wt 116.5 kg (256 lb 13.4 oz)   SpO2 97%   BMI 37.93 kg/m²       Intake/Output Summary (Last 24 hours) at 11/28/2019 0809  Last data filed at 11/28/2019 0420  Gross per 24 hour   Intake 0.41 ml   Output --   Net 0.41 ml        General Appearance: Well developed, well nourished, alert & oriented x 3,    no acute distress. Ears/Nose/Mouth/Throat: Pupils equal and round, Hearing grossly normal.  Neck: Supple. JVP within normal limits. Carotids good upstrokes, with no bruit. Chest: Lungs clear to auscultation bilaterally. Cardiovascular: Regular rate and rhythm, S1S2 normal, no murmur, rubs, gallops. Abdomen: Soft, non-tender, bowel sounds are active. No organomegaly. Extremities: No edema bilaterally. Femoral pulses +2, Distal Pulses +1. Skin: Warm and dry. Neuro: CN II-XII grossly intact, Strength and sensation grossly intact. Data:      Prior to Admission medications    Medication Sig Start Date End Date Taking? Authorizing Provider   ondansetron (ZOFRAN ODT) 4 mg disintegrating tablet Take 1 Tab by mouth every eight (8) hours as needed for Nausea. 10/10/19  Yes LOLLY Wagner   LORazepam (ATIVAN) 0.5 mg tablet TAKE 1 TABLET BY MOUTH ONCE DAILY AS NEEDED 11/27/19   Akanksha Hurst MD   cholecalciferol, VITAMIN D3, (VITAMIN D3) 5,000 unit tab tablet Take 1 Tab by mouth daily. 11/19/19   Fabi Rivas MD   clomiPHENE (CLOMID) 50 mg tablet One pill every other day 10/30/19   Mo Mullen MD   lidocaine, PF, (XYLOCAINE) 10 mg/mL (1 %) injection Given in office 10/25/19   Bart King MD   chlorthalidone (HYGROTEN) 25 mg tablet Take 1 Tab by mouth daily. 10/17/19   Akanksha Hurst MD   lisinopril (PRINIVIL, ZESTRIL) 10 mg tablet TAKE 1 TABLET BY MOUTH ONCE DAILY 10/17/19   Akanksha Hurst MD   galcanezumab-gn (EMGALITY PEN) 120 mg/mL injection 1 mL by SubCUTAneous route every thirty (30) days.  9/25/19   Bart King MD   cyclobenzaprine (FLEXERIL) 10 mg tablet Take 10 mg by mouth three (3) times daily as needed for Muscle Spasm(s). Eliza, MD Audrey   aspirin delayed-release 81 mg tablet Take 1 Tab by mouth daily. 9/24/19   Regine Verduzco MD   butalbital-acetaminophen-caffeine (FIORICET, ESGIC) -40 mg per tablet TAKE 1 TABLET BY MOUTH AT THE ONSET OF HEADACHE. CAN TAKE TWICE DAILY BUT NOT MORE THAN 10 DAYS/MONTH.ONLY TO BE FILLED IN 1 MONTH INTERVALS 9/18/19   Bubba Nichols MD   busPIRone (BUSPAR) 15 mg tablet Take 1 Tab by mouth three (3) times daily. Patient taking differently: Take 15 mg by mouth two (2) times a day. 9/11/19   Anaya Heller MD   sertraline (ZOLOFT) 100 mg tablet Take 1 Tab by mouth daily. 8/26/19   Anaya Heller MD   metFORMIN (GLUCOPHAGE) 500 mg tablet TAKE 1 TABLET BY MOUTH TWICE DAILY WITH MEALS 6/21/19   Jacklyn Henriquez MD   insulin NPH (NOVOLIN N NPH U-100 INSULIN) 100 unit/mL injection by SubCUTAneous route once. 80 units twice a day    Provider, Pratima   levothyroxine (SYNTHROID) 112 mcg tablet TAKE 1 TABLET BY MOUTH ONCE DAILY BEFORE BREAKFAST 4/29/19   Jacklyn Henriquez MD   fenofibrate (LOFIBRA) 160 mg tablet TAKE 1 TABLET BY MOUTH ONCE DAILY 3/25/19   Jacklyn Henriquez MD   rosuvastatin (CRESTOR) 20 mg tablet Take 1 Tab by mouth nightly. 3/22/19   Jacklyn Henriquez MD   insulin regular (NOVOLIN R, HUMULIN R) 100 unit/mL injection 15-20 units TID [facility]  Patient taking differently: 10 units 2-3 times per day  Indications: 0-20u BID, per pt 12/16/16   Tru Kellogg MD       Recent Results (from the past 24 hour(s))   CBC WITH AUTOMATED DIFF    Collection Time: 11/28/19  2:31 AM   Result Value Ref Range    WBC 11.3 (H) 4.1 - 11.1 K/uL    RBC 5.09 4. 10 - 5.70 M/uL    HGB 14.7 12.1 - 17.0 g/dL    HCT 44.5 36.6 - 50.3 %    MCV 87.4 80.0 - 99.0 FL    MCH 28.9 26.0 - 34.0 PG    MCHC 33.0 30.0 - 36.5 g/dL    RDW 13.6 11.5 - 14.5 %    PLATELET 339 476 - 230 K/uL    MPV 10.5 8.9 - 12.9 FL    NRBC 0.0 0  WBC ABSOLUTE NRBC 0.00 0.00 - 0.01 K/uL    NEUTROPHILS 52 32 - 75 %    LYMPHOCYTES 36 12 - 49 %    MONOCYTES 7 5 - 13 %    EOSINOPHILS 3 0 - 7 %    BASOPHILS 1 0 - 1 %    IMMATURE GRANULOCYTES 1 (H) 0.0 - 0.5 %    ABS. NEUTROPHILS 5.9 1.8 - 8.0 K/UL    ABS. LYMPHOCYTES 4.1 (H) 0.8 - 3.5 K/UL    ABS. MONOCYTES 0.8 0.0 - 1.0 K/UL    ABS. EOSINOPHILS 0.3 0.0 - 0.4 K/UL    ABS. BASOPHILS 0.1 0.0 - 0.1 K/UL    ABS. IMM. GRANS. 0.2 (H) 0.00 - 0.04 K/UL    DF AUTOMATED     METABOLIC PANEL, COMPREHENSIVE    Collection Time: 11/28/19  2:31 AM   Result Value Ref Range    Sodium 137 136 - 145 mmol/L    Potassium 3.8 3.5 - 5.1 mmol/L    Chloride 103 97 - 108 mmol/L    CO2 27 21 - 32 mmol/L    Anion gap 7 5 - 15 mmol/L    Glucose 164 (H) 65 - 100 mg/dL    BUN 19 6 - 20 MG/DL    Creatinine 1.33 (H) 0.70 - 1.30 MG/DL    BUN/Creatinine ratio 14 12 - 20      GFR est AA >60 >60 ml/min/1.73m2    GFR est non-AA 60 (L) >60 ml/min/1.73m2    Calcium 9.4 8.5 - 10.1 MG/DL    Bilirubin, total 0.4 0.2 - 1.0 MG/DL    ALT (SGPT) 47 12 - 78 U/L    AST (SGOT) 21 15 - 37 U/L    Alk.  phosphatase 66 45 - 117 U/L    Protein, total 7.4 6.4 - 8.2 g/dL    Albumin 3.8 3.5 - 5.0 g/dL    Globulin 3.6 2.0 - 4.0 g/dL    A-G Ratio 1.1 1.1 - 2.2     CK W/ REFLX CKMB    Collection Time: 11/28/19  2:31 AM   Result Value Ref Range    CK 94 39 - 308 U/L   TROPONIN I    Collection Time: 11/28/19  2:31 AM   Result Value Ref Range    Troponin-I, Qt. <0.05 <0.05 ng/mL   NT-PRO BNP    Collection Time: 11/28/19  2:31 AM   Result Value Ref Range    NT pro-BNP <5 <125 PG/ML   POC TROPONIN-I    Collection Time: 11/28/19  2:31 AM   Result Value Ref Range    Troponin-I (POC) <0.04 0.00 - 0.08 ng/mL   D DIMER    Collection Time: 11/28/19  4:25 AM   Result Value Ref Range    D-dimer <0.19 0.00 - 0.65 mg/L FEU   EKG, 12 LEAD, INITIAL    Collection Time: 11/28/19  6:02 AM   Result Value Ref Range    Ventricular Rate 90 BPM    Atrial Rate 90 BPM    P-R Interval 162 ms    QRS Duration 90 ms    Q-T Interval 330 ms    QTC Calculation (Bezet) 403 ms    Calculated P Axis 40 degrees    Calculated R Axis 12 degrees    Calculated T Axis 60 degrees    Diagnosis       Normal sinus rhythm  Possible Inferior infarct , age undetermined  Anterior infarct , age undetermined  Abnormal ECG  When compared with ECG of 28-NOV-2019 02:26,  MANUAL COMPARISON REQUIRED, DATA IS UNCONFIRMED     CK W/ CKMB & INDEX    Collection Time: 11/28/19  6:10 AM   Result Value Ref Range    CK 81 39 - 308 U/L    CK - MB 1.4 <3.6 NG/ML    CK-MB Index 1.7 0.0 - 2.5     SAMPLES BEING HELD    Collection Time: 11/28/19  6:10 AM   Result Value Ref Range    SAMPLES BEING HELD 1SST     COMMENT        Add-on orders for these samples will be processed based on acceptable specimen integrity and analyte stability, which may vary by analyte.    TROPONIN I    Collection Time: 11/28/19  6:10 AM   Result Value Ref Range    Troponin-I, Qt. <0.05 <0.05 ng/mL

## 2019-11-28 NOTE — Clinical Note
Lesion located in the Proximal LAD. Balloon inserted. Balloon inflated using multiple inflations inflation technique. Pressure = 18 greg; Duration = 24 sec. Inflation 2: Pressure: 18 greg; Duration: 24 sec. Inflation 3: Pressure: 15 greg; Duration: 10 sec. Inflation 4: Pressure: 15 greg; Duration: 10 sec. Inflation 5: Pressure: 15 greg; Duration: 10 sec.

## 2019-11-28 NOTE — ED NOTES
Patient presents to the ED via EMS complaining of left side chest pain. Patient reports he is currently under the care of Shaheed Mike MD (Cardiologist) for chest pain. Patient acknowledges having a heart block. Patient also reports he has a scheduled cardiac cath next week. EMS reports giving 324 of asa and 1 nitro tablet. Patient placed on the monitor x3 and provided with his call bell.

## 2019-11-28 NOTE — Clinical Note
Lesion: Located in the Mid LAD. Stent inserted. Stent deployed. Single technique used. First inflation pressure = 12 greg; inflation time: 20 sec.

## 2019-11-28 NOTE — Clinical Note
Lesion: Located in the Proximal RCA. Stent deployed. Single technique used. First inflation pressure = 14 greg; inflation time: 20 sec.

## 2019-11-28 NOTE — Clinical Note
Lesion located in the Mid RCA. Balloon inflated using multiple inflations inflation technique. Pressure = 14 greg; Duration = 12 sec. Inflation 2: Pressure: 15 greg; Duration: 10 sec. Inflation 3: Pressure: 15 greg; Duration: 10 sec.

## 2019-11-28 NOTE — Clinical Note
Lesion located in the Proximal RCA. Balloon inflated using multiple inflations inflation technique. Pressure = 15 greg; Duration = 0 sec. Inflation 2: Pressure: 15 greg; Duration: 10 sec. Inflation 3: Pressure: 15 greg; Duration: 10 sec.

## 2019-11-28 NOTE — Clinical Note
TRANSFER - OUT REPORT:     Verbal report given to: Emily Almaraz RN. Report consisted of patient's Situation, Background, Assessment and   Recommendations(SBAR). Opportunity for questions and clarification was provided. Patient transported to: IVCU.

## 2019-11-28 NOTE — Clinical Note
Lesion: Located in the Proximal LAD. Stent inserted. Stent deployed. Multiple inflations used. First inflation pressure = 12 greg; inflation time: 12 sec. Second inflation pressure: 15 greg; inflation time: 11 sec.

## 2019-11-28 NOTE — Clinical Note
Lesion located in the Proximal RCA. Balloon inflated using single inflation technique. Pressure = 10 greg; Duration = 16 sec.

## 2019-11-28 NOTE — PROGRESS NOTES
Problem: Falls - Risk of  Goal: *Absence of Falls  Description  Document Redgie Curet Fall Risk and appropriate interventions in the flowsheet.   Outcome: Progressing Towards Goal  Note: Fall Risk Interventions:            Medication Interventions: Patient to call before getting OOB

## 2019-11-28 NOTE — Clinical Note
Lesion: Located in the Proximal LAD. Stent inserted. Single technique used. First inflation pressure = 15 greg; inflation time: 15 sec.

## 2019-11-29 ENCOUNTER — APPOINTMENT (OUTPATIENT)
Dept: NON INVASIVE DIAGNOSTICS | Age: 39
DRG: 246 | End: 2019-11-29
Attending: INTERNAL MEDICINE
Payer: COMMERCIAL

## 2019-11-29 LAB
ANION GAP SERPL CALC-SCNC: 6 MMOL/L (ref 5–15)
ATRIAL RATE: 66 BPM
AV VELOCITY RATIO: 0.54
BUN SERPL-MCNC: 21 MG/DL (ref 6–20)
BUN/CREAT SERPL: 14 (ref 12–20)
CALCIUM SERPL-MCNC: 8.5 MG/DL (ref 8.5–10.1)
CALCULATED P AXIS, ECG09: 43 DEGREES
CALCULATED R AXIS, ECG10: 19 DEGREES
CALCULATED T AXIS, ECG11: 35 DEGREES
CHLORIDE SERPL-SCNC: 104 MMOL/L (ref 97–108)
CO2 SERPL-SCNC: 26 MMOL/L (ref 21–32)
CREAT SERPL-MCNC: 1.47 MG/DL (ref 0.7–1.3)
DIAGNOSIS, 93000: NORMAL
ECHO AV AREA PEAK VELOCITY: 1.6 CM2
ECHO AV AREA/BSA PEAK VELOCITY: 0.7 CM2/M2
ECHO AV PEAK GRADIENT: 7.5 MMHG
ECHO AV PEAK VELOCITY: 137.11 CM/S
ECHO EST RA PRESSURE: 10 MMHG
ECHO LA AREA 4C: 11.8 CM2
ECHO LA MAJOR AXIS: 2.94 CM
ECHO LA VOL 4C: 22.67 ML (ref 18–58)
ECHO LA VOLUME INDEX A4C: 9.88 ML/M2 (ref 16–28)
ECHO LV E' LATERAL VELOCITY: 12.2 CM/S
ECHO LV E' SEPTAL VELOCITY: 5.99 CM/S
ECHO LV EDV TEICHHOLZ: 0.36 ML
ECHO LV ESV TEICHHOLZ: 0.14 ML
ECHO LV INTERNAL DIMENSION DIASTOLIC: 3.84 CM (ref 4.2–5.9)
ECHO LV INTERNAL DIMENSION SYSTOLIC: 2.63 CM
ECHO LV IVSD: 1.5 CM (ref 0.6–1)
ECHO LV MASS 2D: 264.1 G (ref 88–224)
ECHO LV MASS INDEX 2D: 115.1 G/M2 (ref 49–115)
ECHO LV POSTERIOR WALL DIASTOLIC: 1.52 CM (ref 0.6–1)
ECHO LVOT DIAM: 1.94 CM
ECHO LVOT PEAK GRADIENT: 2.2 MMHG
ECHO LVOT PEAK VELOCITY: 74.7 CM/S
ECHO MV A VELOCITY: 67.32 CM/S
ECHO MV E DECELERATION TIME (DT): 246.6 MS
ECHO MV E VELOCITY: 86.69 CM/S
ECHO MV E/A RATIO: 1.29
ECHO MV E/E' LATERAL: 7.11
ECHO MV E/E' RATIO (AVERAGED): 10.79
ECHO MV E/E' SEPTAL: 14.47
ECHO MV REGURGITANT PEAK GRADIENT: 3.7 MMHG
ECHO MV REGURGITANT PEAK VELOCITY: 95.9 CM/S
ECHO PULMONARY ARTERY SYSTOLIC PRESSURE (PASP): 15.4 MMHG
ECHO RIGHT VENTRICULAR SYSTOLIC PRESSURE (RVSP): 15.4 MMHG
ECHO TV REGURGITANT MAX VELOCITY: 116.67 CM/S
ECHO TV REGURGITANT PEAK GRADIENT: 5.4 MMHG
ERYTHROCYTE [DISTWIDTH] IN BLOOD BY AUTOMATED COUNT: 13.3 % (ref 11.5–14.5)
GLUCOSE BLD STRIP.AUTO-MCNC: 201 MG/DL (ref 65–100)
GLUCOSE BLD STRIP.AUTO-MCNC: 296 MG/DL (ref 65–100)
GLUCOSE BLD STRIP.AUTO-MCNC: 296 MG/DL (ref 65–100)
GLUCOSE BLD STRIP.AUTO-MCNC: 327 MG/DL (ref 65–100)
GLUCOSE BLD STRIP.AUTO-MCNC: 401 MG/DL (ref 65–100)
GLUCOSE SERPL-MCNC: 370 MG/DL (ref 65–100)
HCT VFR BLD AUTO: 40.1 % (ref 36.6–50.3)
HGB BLD-MCNC: 13 G/DL (ref 12.1–17)
LVFS 2D: 31.55 %
LVSV (TEICH): 16.13 ML
MCH RBC QN AUTO: 28.3 PG (ref 26–34)
MCHC RBC AUTO-ENTMCNC: 32.4 G/DL (ref 30–36.5)
MCV RBC AUTO: 87.4 FL (ref 80–99)
MV DEC SLOPE: 3.52
NRBC # BLD: 0 K/UL (ref 0–0.01)
NRBC BLD-RTO: 0 PER 100 WBC
P-R INTERVAL, ECG05: 152 MS
PLATELET # BLD AUTO: 187 K/UL (ref 150–400)
PMV BLD AUTO: 10.5 FL (ref 8.9–12.9)
POTASSIUM SERPL-SCNC: 5 MMOL/L (ref 3.5–5.1)
Q-T INTERVAL, ECG07: 364 MS
QRS DURATION, ECG06: 90 MS
QTC CALCULATION (BEZET), ECG08: 381 MS
RBC # BLD AUTO: 4.59 M/UL (ref 4.1–5.7)
SERVICE CMNT-IMP: ABNORMAL
SODIUM SERPL-SCNC: 136 MMOL/L (ref 136–145)
VENTRICULAR RATE, ECG03: 66 BPM
WBC # BLD AUTO: 8.9 K/UL (ref 4.1–11.1)

## 2019-11-29 PROCEDURE — 74011250637 HC RX REV CODE- 250/637: Performed by: INTERNAL MEDICINE

## 2019-11-29 PROCEDURE — C1769 GUIDE WIRE: HCPCS | Performed by: INTERNAL MEDICINE

## 2019-11-29 PROCEDURE — 65660000000 HC RM CCU STEPDOWN

## 2019-11-29 PROCEDURE — 4A023N7 MEASUREMENT OF CARDIAC SAMPLING AND PRESSURE, LEFT HEART, PERCUTANEOUS APPROACH: ICD-10-PCS | Performed by: INTERNAL MEDICINE

## 2019-11-29 PROCEDURE — 77030004549 HC CATH ANGI DX PRF MRTM -A: Performed by: INTERNAL MEDICINE

## 2019-11-29 PROCEDURE — 93005 ELECTROCARDIOGRAM TRACING: CPT

## 2019-11-29 PROCEDURE — 92928 PRQ TCAT PLMT NTRAC ST 1 LES: CPT | Performed by: INTERNAL MEDICINE

## 2019-11-29 PROCEDURE — 77030019569 HC BND COMPR RAD TERU -B: Performed by: INTERNAL MEDICINE

## 2019-11-29 PROCEDURE — 74011250636 HC RX REV CODE- 250/636: Performed by: INTERNAL MEDICINE

## 2019-11-29 PROCEDURE — B2151ZZ FLUOROSCOPY OF LEFT HEART USING LOW OSMOLAR CONTRAST: ICD-10-PCS | Performed by: INTERNAL MEDICINE

## 2019-11-29 PROCEDURE — 77030030195 HC CATH ANGI DX PRF4 MRTM -A: Performed by: INTERNAL MEDICINE

## 2019-11-29 PROCEDURE — 77030019697 HC SYR ANGI INFL MRTM -B: Performed by: INTERNAL MEDICINE

## 2019-11-29 PROCEDURE — 77030010221 HC SPLNT WR POS TELE -B: Performed by: INTERNAL MEDICINE

## 2019-11-29 PROCEDURE — 36415 COLL VENOUS BLD VENIPUNCTURE: CPT

## 2019-11-29 PROCEDURE — 77030019569 HC BND COMPR RAD TERU -B

## 2019-11-29 PROCEDURE — 99153 MOD SED SAME PHYS/QHP EA: CPT | Performed by: INTERNAL MEDICINE

## 2019-11-29 PROCEDURE — C1874 STENT, COATED/COV W/DEL SYS: HCPCS | Performed by: INTERNAL MEDICINE

## 2019-11-29 PROCEDURE — 74011636320 HC RX REV CODE- 636/320: Performed by: INTERNAL MEDICINE

## 2019-11-29 PROCEDURE — 93306 TTE W/DOPPLER COMPLETE: CPT

## 2019-11-29 PROCEDURE — 82962 GLUCOSE BLOOD TEST: CPT

## 2019-11-29 PROCEDURE — C1894 INTRO/SHEATH, NON-LASER: HCPCS | Performed by: INTERNAL MEDICINE

## 2019-11-29 PROCEDURE — 77030015766: Performed by: INTERNAL MEDICINE

## 2019-11-29 PROCEDURE — 85027 COMPLETE CBC AUTOMATED: CPT

## 2019-11-29 PROCEDURE — B2111ZZ FLUOROSCOPY OF MULTIPLE CORONARY ARTERIES USING LOW OSMOLAR CONTRAST: ICD-10-PCS | Performed by: INTERNAL MEDICINE

## 2019-11-29 PROCEDURE — 93458 L HRT ARTERY/VENTRICLE ANGIO: CPT | Performed by: INTERNAL MEDICINE

## 2019-11-29 PROCEDURE — 027137Z DILATION OF CORONARY ARTERY, TWO ARTERIES WITH FOUR OR MORE DRUG-ELUTING INTRALUMINAL DEVICES, PERCUTANEOUS APPROACH: ICD-10-PCS | Performed by: INTERNAL MEDICINE

## 2019-11-29 PROCEDURE — 74011000250 HC RX REV CODE- 250: Performed by: INTERNAL MEDICINE

## 2019-11-29 PROCEDURE — C1725 CATH, TRANSLUMIN NON-LASER: HCPCS | Performed by: INTERNAL MEDICINE

## 2019-11-29 PROCEDURE — 80048 BASIC METABOLIC PNL TOTAL CA: CPT

## 2019-11-29 PROCEDURE — 74011636637 HC RX REV CODE- 636/637: Performed by: INTERNAL MEDICINE

## 2019-11-29 PROCEDURE — C1887 CATHETER, GUIDING: HCPCS | Performed by: INTERNAL MEDICINE

## 2019-11-29 PROCEDURE — 99152 MOD SED SAME PHYS/QHP 5/>YRS: CPT | Performed by: INTERNAL MEDICINE

## 2019-11-29 PROCEDURE — 85347 COAGULATION TIME ACTIVATED: CPT

## 2019-11-29 DEVICE — STENT RONYX25030UX RESOLUTE ONYX 2.50X30
Type: IMPLANTABLE DEVICE | Status: FUNCTIONAL
Brand: RESOLUTE ONYX™

## 2019-11-29 DEVICE — STENT RONYX25018UX RESOLUTE ONYX 2.50X18
Type: IMPLANTABLE DEVICE | Status: FUNCTIONAL
Brand: RESOLUTE ONYX™

## 2019-11-29 DEVICE — STENT RONYX22538UX RESOLUTE ONYX 2.25X38
Type: IMPLANTABLE DEVICE | Status: FUNCTIONAL
Brand: RESOLUTE ONYX™

## 2019-11-29 DEVICE — STENT RONYX30012UX RESOLUTE ONYX 3.00X12
Type: IMPLANTABLE DEVICE | Status: FUNCTIONAL
Brand: RESOLUTE ONYX™

## 2019-11-29 RX ORDER — HEPARIN SODIUM 200 [USP'U]/100ML
INJECTION, SOLUTION INTRAVENOUS
Status: COMPLETED | OUTPATIENT
Start: 2019-11-29 | End: 2019-11-29

## 2019-11-29 RX ORDER — LIDOCAINE HYDROCHLORIDE 10 MG/ML
INJECTION, SOLUTION EPIDURAL; INFILTRATION; INTRACAUDAL; PERINEURAL AS NEEDED
Status: DISCONTINUED | OUTPATIENT
Start: 2019-11-29 | End: 2019-11-29 | Stop reason: HOSPADM

## 2019-11-29 RX ORDER — SODIUM CHLORIDE 9 MG/ML
100 INJECTION, SOLUTION INTRAVENOUS CONTINUOUS
Status: DISPENSED | OUTPATIENT
Start: 2019-11-29 | End: 2019-11-29

## 2019-11-29 RX ORDER — FENTANYL CITRATE 50 UG/ML
INJECTION, SOLUTION INTRAMUSCULAR; INTRAVENOUS AS NEEDED
Status: DISCONTINUED | OUTPATIENT
Start: 2019-11-29 | End: 2019-11-29 | Stop reason: HOSPADM

## 2019-11-29 RX ORDER — ATROPINE SULFATE 1 MG/ML
1 INJECTION, SOLUTION INTRAVENOUS ONCE
Status: DISCONTINUED | OUTPATIENT
Start: 2019-11-29 | End: 2019-11-29

## 2019-11-29 RX ORDER — ATROPINE SULFATE 0.1 MG/ML
0.1 INJECTION INTRAVENOUS ONCE
Status: COMPLETED | OUTPATIENT
Start: 2019-11-29 | End: 2019-11-29

## 2019-11-29 RX ORDER — CALCIUM CARBONATE 200(500)MG
200 TABLET,CHEWABLE ORAL
Status: DISCONTINUED | OUTPATIENT
Start: 2019-11-29 | End: 2019-12-03 | Stop reason: HOSPADM

## 2019-11-29 RX ORDER — INSULIN LISPRO 100 [IU]/ML
12 INJECTION, SOLUTION INTRAVENOUS; SUBCUTANEOUS ONCE
Status: COMPLETED | OUTPATIENT
Start: 2019-11-29 | End: 2019-11-29

## 2019-11-29 RX ORDER — BUTALBITAL, ACETAMINOPHEN AND CAFFEINE 50; 325; 40 MG/1; MG/1; MG/1
1 TABLET ORAL
Status: DISCONTINUED | OUTPATIENT
Start: 2019-11-29 | End: 2019-12-03 | Stop reason: HOSPADM

## 2019-11-29 RX ORDER — MIDAZOLAM HYDROCHLORIDE 1 MG/ML
INJECTION, SOLUTION INTRAMUSCULAR; INTRAVENOUS AS NEEDED
Status: DISCONTINUED | OUTPATIENT
Start: 2019-11-29 | End: 2019-11-29 | Stop reason: HOSPADM

## 2019-11-29 RX ORDER — BUSPIRONE HYDROCHLORIDE 10 MG/1
15 TABLET ORAL 3 TIMES DAILY
Status: DISCONTINUED | OUTPATIENT
Start: 2019-11-29 | End: 2019-11-29 | Stop reason: SDUPTHER

## 2019-11-29 RX ORDER — METOPROLOL TARTRATE 25 MG/1
12.5 TABLET, FILM COATED ORAL EVERY 12 HOURS
Status: DISCONTINUED | OUTPATIENT
Start: 2019-11-29 | End: 2019-12-03 | Stop reason: HOSPADM

## 2019-11-29 RX ORDER — LORAZEPAM 0.5 MG/1
0.5 TABLET ORAL DAILY PRN
Status: DISCONTINUED | OUTPATIENT
Start: 2019-11-29 | End: 2019-12-03 | Stop reason: HOSPADM

## 2019-11-29 RX ORDER — PANTOPRAZOLE SODIUM 40 MG/1
40 TABLET, DELAYED RELEASE ORAL
Status: DISCONTINUED | OUTPATIENT
Start: 2019-11-29 | End: 2019-12-03 | Stop reason: HOSPADM

## 2019-11-29 RX ORDER — SODIUM CHLORIDE 0.9 % (FLUSH) 0.9 %
5-40 SYRINGE (ML) INJECTION EVERY 8 HOURS
Status: DISCONTINUED | OUTPATIENT
Start: 2019-11-29 | End: 2019-12-03 | Stop reason: HOSPADM

## 2019-11-29 RX ORDER — SODIUM CHLORIDE 0.9 % (FLUSH) 0.9 %
5-40 SYRINGE (ML) INJECTION AS NEEDED
Status: DISCONTINUED | OUTPATIENT
Start: 2019-11-29 | End: 2019-12-03 | Stop reason: HOSPADM

## 2019-11-29 RX ORDER — VERAPAMIL HYDROCHLORIDE 2.5 MG/ML
INJECTION, SOLUTION INTRAVENOUS AS NEEDED
Status: DISCONTINUED | OUTPATIENT
Start: 2019-11-29 | End: 2019-11-29 | Stop reason: HOSPADM

## 2019-11-29 RX ORDER — HEPARIN SODIUM 1000 [USP'U]/ML
INJECTION, SOLUTION INTRAVENOUS; SUBCUTANEOUS AS NEEDED
Status: DISCONTINUED | OUTPATIENT
Start: 2019-11-29 | End: 2019-11-29 | Stop reason: HOSPADM

## 2019-11-29 RX ORDER — ZOLPIDEM TARTRATE 5 MG/1
5 TABLET ORAL
Status: DISCONTINUED | OUTPATIENT
Start: 2019-11-29 | End: 2019-12-03 | Stop reason: HOSPADM

## 2019-11-29 RX ADMIN — ROSUVASTATIN CALCIUM 20 MG: 10 TABLET, COATED ORAL at 22:37

## 2019-11-29 RX ADMIN — ASPIRIN 81 MG: 81 TABLET, COATED ORAL at 07:51

## 2019-11-29 RX ADMIN — PANTOPRAZOLE SODIUM 40 MG: 40 TABLET, DELAYED RELEASE ORAL at 12:05

## 2019-11-29 RX ADMIN — FENTANYL CITRATE 25 MCG: 50 INJECTION, SOLUTION INTRAMUSCULAR; INTRAVENOUS at 12:09

## 2019-11-29 RX ADMIN — METOPROLOL TARTRATE 25 MG: 25 TABLET ORAL at 08:03

## 2019-11-29 RX ADMIN — LISINOPRIL 10 MG: 5 TABLET ORAL at 08:03

## 2019-11-29 RX ADMIN — NITROGLYCERIN 1 INCH: 20 OINTMENT TOPICAL at 08:04

## 2019-11-29 RX ADMIN — SERTRALINE HYDROCHLORIDE 100 MG: 50 TABLET ORAL at 08:11

## 2019-11-29 RX ADMIN — Medication 10 ML: at 17:08

## 2019-11-29 RX ADMIN — FENTANYL CITRATE 25 MCG: 50 INJECTION, SOLUTION INTRAMUSCULAR; INTRAVENOUS at 07:50

## 2019-11-29 RX ADMIN — BUSPIRONE HYDROCHLORIDE 15 MG: 10 TABLET ORAL at 17:08

## 2019-11-29 RX ADMIN — NITROGLYCERIN 1 INCH: 20 OINTMENT TOPICAL at 04:23

## 2019-11-29 RX ADMIN — Medication 10 ML: at 21:36

## 2019-11-29 RX ADMIN — INSULIN LISPRO 5 UNITS: 100 INJECTION, SOLUTION INTRAVENOUS; SUBCUTANEOUS at 12:07

## 2019-11-29 RX ADMIN — INSULIN LISPRO 2 UNITS: 100 INJECTION, SOLUTION INTRAVENOUS; SUBCUTANEOUS at 21:34

## 2019-11-29 RX ADMIN — LEVOTHYROXINE SODIUM 100 MCG: 75 TABLET ORAL at 07:25

## 2019-11-29 RX ADMIN — HUMAN INSULIN 60 UNITS: 100 INJECTION, SUSPENSION SUBCUTANEOUS at 16:13

## 2019-11-29 RX ADMIN — HUMAN INSULIN 40 UNITS: 100 INJECTION, SUSPENSION SUBCUTANEOUS at 12:08

## 2019-11-29 RX ADMIN — INSULIN LISPRO 5 UNITS: 100 INJECTION, SOLUTION INTRAVENOUS; SUBCUTANEOUS at 16:13

## 2019-11-29 RX ADMIN — BUTALBITAL, ACETAMINOPHEN AND CAFFEINE 1 TABLET: 50; 325; 40 TABLET ORAL at 19:33

## 2019-11-29 RX ADMIN — INSULIN LISPRO 12 UNITS: 100 INJECTION, SOLUTION INTRAVENOUS; SUBCUTANEOUS at 08:02

## 2019-11-29 RX ADMIN — HUMAN INSULIN 6 UNITS: 100 INJECTION, SOLUTION SUBCUTANEOUS at 16:13

## 2019-11-29 RX ADMIN — HUMAN INSULIN 6 UNITS: 100 INJECTION, SOLUTION SUBCUTANEOUS at 12:06

## 2019-11-29 RX ADMIN — FENOFIBRATE 145 MG: 145 TABLET ORAL at 12:05

## 2019-11-29 RX ADMIN — METOPROLOL TARTRATE 12.5 MG: 25 TABLET ORAL at 21:33

## 2019-11-29 RX ADMIN — BUSPIRONE HYDROCHLORIDE 15 MG: 10 TABLET ORAL at 08:03

## 2019-11-29 RX ADMIN — Medication 10 ML: at 07:50

## 2019-11-29 RX ADMIN — ATROPINE SULFATE 0.1 MG: 0.1 INJECTION PARENTERAL at 11:41

## 2019-11-29 RX ADMIN — CALCIUM CARBONATE (ANTACID) CHEW TAB 500 MG 200 MG: 500 CHEW TAB at 12:05

## 2019-11-29 RX ADMIN — TICAGRELOR 90 MG: 90 TABLET ORAL at 22:37

## 2019-11-29 RX ADMIN — FENTANYL CITRATE 25 MCG: 50 INJECTION, SOLUTION INTRAMUSCULAR; INTRAVENOUS at 16:14

## 2019-11-29 NOTE — PROGRESS NOTES
[facility]  Home  Follow up appointments    Reason for Admission:   Chest pain, unstable angina                   RRAT Score:    5                 Plan for utilizing home health: Yet to be determined. Not homebound. Current Advanced Directive/Advance Care Plan:   Not on file                         Transition of Care Plan:       Home  Follow up appointments    CM met with patient to confirm demographics and discuss discharge planning. Pt name and  confirmed as pt identifiers. Patient is , lives with his parents in a single story home. Employed. ADL's/IADL's - independent pta to include driving. DME - none  Preferred Rx  - R [address] no previous experience    Update emergency contacts: Add:  Martha Renner  [address] no phone provided. Shanika Pacheco [phone]          Care Management Interventions  PCP Verified by CM: Yes  Mode of Transport at Discharge: Other (see comment)(family)  Transition of Care Consult (CM Consult): Discharge Planning  Discharge Durable Medical Equipment: No  Physical Therapy Consult: No  Occupational Therapy Consult: No  Speech Therapy Consult: No  Current Support Network: Other(lives with parents 1 story home)  Confirm Follow Up Transport: Family  Plan discussed with Pt/Family/Caregiver: Yes  Discharge Location  Discharge Placement: Home    Pateint is scheduled for procedure on Monday, 2019. CM will continue to follow for discharge planning.      Valdo Pop RN CM  Ext [phone]

## 2019-11-29 NOTE — PROCEDURES
L Cath     PCI of LAD   PCI of Cx     LAD with long segment of disease through prox and mid vessel. Up to 90% stenosis. PCI with several overlapping JAMIL OG to 0% . PHIL 3 flow. Cx marginal with 80% lesion. PCI with JAMIL OG to 0%. PHIL 3    The RCA is total occlusion in mid vessel with colaterals from the left. Unable to seat a guide well. No intervetion     LV looks normal.      Check Echo.      Will return to lab next week and see about doing the RCA from the Femoral.

## 2019-11-29 NOTE — PROGRESS NOTES
1900: Received report from day shift nurse Joshua Gaston. Reviewed SBAR, Kardex, I/O, MAR, Procedural information and Recent results. VSS, NSR (rhythm), RA (oxygenation). A/O X 4  Pt resting in bed/sitting up in the bed. Family at bedside. Pt reporting no CP/SOB. 2200: Telehealth request for 15mg buspar to be ordered BID.    2300: VSS, NSR, no complaints. 0300: Labs drawn. VSS, NSR, no complaints. Nitro paste 1 inch on left thigh. 0700: Bedside and Verbal shift change report given to day shift nurse Joshua Gaston (oncoming nurse) by KG Irene (offgoing nurse). Report included the following information: SBAR, Kardex, Intake/Output, MAR, Procedural information, and Recent Results.

## 2019-11-29 NOTE — PROGRESS NOTES
Hospitalist Progress Note    NAME: Terrie Rai   :  1980   MRN:  [id number]       Assessment / Plan:  Unstable angina   S/p cardiac cath with PCI LAD and LCX on   Unsuccessful PCI of the RCA   Pt came in with CP and abnormal Outpatient stress test   Underwent cardiac cath with stent placement on LAD and LCX . RCA completely occluded , unable to access . Plan is to return to cath lab next week to try to stent RCA through femoral access  C/w asa , brilinta , statin , BB , hold ACEI because SERGE  2D echo showed EF 61%     Hypertension  BP soft , decrease BB dose , hold ACEI   C/w IVF      SERGE   Creat 1.33> 1.47  C/w IVF     DM2 with hyperglycemia   BS > 300  -restart home dose regimen  novolin N and humulin   C/w SSI   Hba1c 10%     Hyperlipidemia  Depression   Hypothyroidism   Migraines   -Continue with statin , c/w zoloft   - c/w synthroid   - c/w prn Fioricet   Check lipid profile       Morbid obesity : BMI > 35 with complications DM2 HTN   Code Status: Full   Surrogate Decision Maker:Arnaud Cervantes     DVT Prophylaxis: Lovenox   GI Prophylaxis: not indicated      30.0 - 39.9 Obese / Body mass index is 37.8 kg/m². Subjective:     Chief Complaint / Reason for Physician Visit  Fu unstable angina. Seen before going to cath . Pt c/o HA and BS is 400mg this am .    Discussed with RN events overnight. Review of Systems:  Symptom Y/N Comments  Symptom Y/N Comments   Fever/Chills n   Chest Pain n    Poor Appetite    Edema     Cough    Abdominal Pain n    Sputum    Joint Pain     SOB/COLE n   Pruritis/Rash     Nausea/vomit n   Tolerating PT/OT     Diarrhea n   Tolerating Diet  npo   Constipation    Other y HA      Could NOT obtain due to:      Objective:     VITALS:   Last 24hrs VS reviewed since prior progress note.  Most recent are:  Patient Vitals for the past 24 hrs:   Temp Pulse Resp BP SpO2   19 1155 -- 74 -- 106/60 97 %   19 1145 -- 73 -- 102/56 97 %   19 1130 -- -- -- 96/60 --   11/29/19 1125 -- 67 -- 91/57 93 %   11/29/19 1115 -- -- -- 107/57 --   11/29/19 1100 -- 72 -- 111/69 96 %   11/29/19 1050 97.8 °F (36.6 °C) 69 17 105/71 95 %   11/29/19 1045 -- -- -- 104/65 --   11/29/19 1038 97.7 °F (36.5 °C) 68 16 120/64 96 %   11/29/19 0700 98.1 °F (36.7 °C) 81 20 121/73 --   11/29/19 0300 98.1 °F (36.7 °C) 80 18 141/73 93 %   11/28/19 2300 97.6 °F (36.4 °C) 74 18 104/64 93 %   11/28/19 1900 98.3 °F (36.8 °C) 84 18 112/60 94 %   11/28/19 1700 98 °F (36.7 °C) 76 17 114/48 99 %   11/28/19 1530 98.8 °F (37.1 °C) 75 18 116/64 98 %   11/28/19 1357 -- -- -- 135/51 --   11/28/19 1339 -- -- -- 94/50 --   11/28/19 1330 97.9 °F (36.6 °C) 74 20 114/55 97 %   11/28/19 1254 97.5 °F (36.4 °C) 72 18 91/53 98 %     No intake or output data in the 24 hours ending 11/29/19 1230     PHYSICAL EXAM:  General: WD, WN. Alert, cooperative, no acute distress    EENT:  EOMI. Anicteric sclerae. MMM  Resp:  CTA bilaterally, no wheezing or rales. No accessory muscle use  CV:  Regular  rhythm,  No edema  GI:  Soft, Non distended, Non tender.  +Bowel sounds  Neurologic:  Alert and oriented X 3, normal speech,   Psych:   Good insight. Not anxious nor agitated  Skin:  No rashes. No jaundice    Reviewed most current lab test results and cultures  YES  Reviewed most current radiology test results   YES  Review and summation of old records today    NO  Reviewed patient's current orders and MAR    YES  PMH/SH reviewed - no change compared to H&P  ________________________________________________________________________  Care Plan discussed with:    Comments   Patient x    Family      RN x    Care Manager     Consultant                        Multidiciplinary team rounds were held today with , nursing, pharmacist and clinical coordinator. Patient's plan of care was discussed; medications were reviewed and discharge planning was addressed. ________________________________________________________________________  Total NON critical care TIME:  35   Minutes    Total CRITICAL CARE TIME Spent:   Minutes non procedure based      Comments   >50% of visit spent in counseling and coordination of care x    ________________________________________________________________________  Atiya Gudino MD     Procedures: see electronic medical records for all procedures/Xrays and details which were not copied into this note but were reviewed prior to creation of Plan. LABS:  I reviewed today's most current labs and imaging studies.   Pertinent labs include:  Recent Labs     11/29/19 0358 11/28/19 0231   WBC 8.9 11.3*   HGB 13.0 14.7   HCT 40.1 44.5    214     Recent Labs     11/29/19 0358 11/28/19 0231    137   K 5.0 3.8    103   CO2 26 27   * 164*   BUN 21* 19   CREA 1.47* 1.33*   CA 8.5 9.4   ALB  --  3.8   TBILI  --  0.4   SGOT  --  21   ALT  --  47       Signed: Atiya Gudino MD

## 2019-11-30 LAB
ANION GAP SERPL CALC-SCNC: 6 MMOL/L (ref 5–15)
BASOPHILS # BLD: 0.1 K/UL (ref 0–0.1)
BASOPHILS NFR BLD: 1 % (ref 0–1)
BUN SERPL-MCNC: 18 MG/DL (ref 6–20)
BUN/CREAT SERPL: 14 (ref 12–20)
CALCIUM SERPL-MCNC: 9.2 MG/DL (ref 8.5–10.1)
CHLORIDE SERPL-SCNC: 108 MMOL/L (ref 97–108)
CHOLEST SERPL-MCNC: 124 MG/DL
CO2 SERPL-SCNC: 27 MMOL/L (ref 21–32)
CREAT SERPL-MCNC: 1.27 MG/DL (ref 0.7–1.3)
DIFFERENTIAL METHOD BLD: ABNORMAL
EOSINOPHIL # BLD: 0.3 K/UL (ref 0–0.4)
EOSINOPHIL NFR BLD: 3 % (ref 0–7)
ERYTHROCYTE [DISTWIDTH] IN BLOOD BY AUTOMATED COUNT: 13.6 % (ref 11.5–14.5)
GLUCOSE BLD STRIP.AUTO-MCNC: 193 MG/DL (ref 65–100)
GLUCOSE BLD STRIP.AUTO-MCNC: 298 MG/DL (ref 65–100)
GLUCOSE BLD STRIP.AUTO-MCNC: 388 MG/DL (ref 65–100)
GLUCOSE BLD STRIP.AUTO-MCNC: 424 MG/DL (ref 65–100)
GLUCOSE SERPL-MCNC: 73 MG/DL (ref 65–100)
HCT VFR BLD AUTO: 42.3 % (ref 36.6–50.3)
HDLC SERPL-MCNC: 31 MG/DL
HDLC SERPL: 4 {RATIO} (ref 0–5)
HGB BLD-MCNC: 14.1 G/DL (ref 12.1–17)
IMM GRANULOCYTES # BLD AUTO: 0.1 K/UL (ref 0–0.04)
IMM GRANULOCYTES NFR BLD AUTO: 1 % (ref 0–0.5)
LDLC SERPL CALC-MCNC: 47.2 MG/DL (ref 0–100)
LIPID PROFILE,FLP: ABNORMAL
LYMPHOCYTES # BLD: 3.2 K/UL (ref 0.8–3.5)
LYMPHOCYTES NFR BLD: 29 % (ref 12–49)
MCH RBC QN AUTO: 28.7 PG (ref 26–34)
MCHC RBC AUTO-ENTMCNC: 33.3 G/DL (ref 30–36.5)
MCV RBC AUTO: 86 FL (ref 80–99)
MONOCYTES # BLD: 0.8 K/UL (ref 0–1)
MONOCYTES NFR BLD: 8 % (ref 5–13)
NEUTS SEG # BLD: 6.4 K/UL (ref 1.8–8)
NEUTS SEG NFR BLD: 58 % (ref 32–75)
NRBC # BLD: 0 K/UL (ref 0–0.01)
NRBC BLD-RTO: 0 PER 100 WBC
PLATELET # BLD AUTO: 208 K/UL (ref 150–400)
PMV BLD AUTO: 10.5 FL (ref 8.9–12.9)
POTASSIUM SERPL-SCNC: 3.7 MMOL/L (ref 3.5–5.1)
RBC # BLD AUTO: 4.92 M/UL (ref 4.1–5.7)
SERVICE CMNT-IMP: ABNORMAL
SODIUM SERPL-SCNC: 141 MMOL/L (ref 136–145)
TRIGL SERPL-MCNC: 229 MG/DL (ref ?–150)
VLDLC SERPL CALC-MCNC: 45.8 MG/DL
WBC # BLD AUTO: 10.9 K/UL (ref 4.1–11.1)

## 2019-11-30 PROCEDURE — 82962 GLUCOSE BLOOD TEST: CPT

## 2019-11-30 PROCEDURE — 74011250636 HC RX REV CODE- 250/636: Performed by: INTERNAL MEDICINE

## 2019-11-30 PROCEDURE — 85025 COMPLETE CBC W/AUTO DIFF WBC: CPT

## 2019-11-30 PROCEDURE — 74011636637 HC RX REV CODE- 636/637: Performed by: INTERNAL MEDICINE

## 2019-11-30 PROCEDURE — 74011250637 HC RX REV CODE- 250/637: Performed by: INTERNAL MEDICINE

## 2019-11-30 PROCEDURE — 80061 LIPID PANEL: CPT

## 2019-11-30 PROCEDURE — 80048 BASIC METABOLIC PNL TOTAL CA: CPT

## 2019-11-30 PROCEDURE — 65660000000 HC RM CCU STEPDOWN

## 2019-11-30 PROCEDURE — 36415 COLL VENOUS BLD VENIPUNCTURE: CPT

## 2019-11-30 RX ORDER — INSULIN LISPRO 100 [IU]/ML
15 INJECTION, SOLUTION INTRAVENOUS; SUBCUTANEOUS ONCE
Status: COMPLETED | OUTPATIENT
Start: 2019-11-30 | End: 2019-11-30

## 2019-11-30 RX ADMIN — FENTANYL CITRATE 25 MCG: 50 INJECTION, SOLUTION INTRAMUSCULAR; INTRAVENOUS at 14:25

## 2019-11-30 RX ADMIN — BUSPIRONE HYDROCHLORIDE 15 MG: 10 TABLET ORAL at 09:42

## 2019-11-30 RX ADMIN — INSULIN LISPRO 15 UNITS: 100 INJECTION, SOLUTION INTRAVENOUS; SUBCUTANEOUS at 12:59

## 2019-11-30 RX ADMIN — Medication 10 ML: at 05:28

## 2019-11-30 RX ADMIN — SERTRALINE HYDROCHLORIDE 100 MG: 50 TABLET ORAL at 09:42

## 2019-11-30 RX ADMIN — FENTANYL CITRATE 25 MCG: 50 INJECTION, SOLUTION INTRAMUSCULAR; INTRAVENOUS at 09:43

## 2019-11-30 RX ADMIN — FENOFIBRATE 145 MG: 145 TABLET ORAL at 09:42

## 2019-11-30 RX ADMIN — HUMAN INSULIN 10 UNITS: 100 INJECTION, SOLUTION SUBCUTANEOUS at 16:49

## 2019-11-30 RX ADMIN — PANTOPRAZOLE SODIUM 40 MG: 40 TABLET, DELAYED RELEASE ORAL at 09:43

## 2019-11-30 RX ADMIN — ROSUVASTATIN CALCIUM 20 MG: 10 TABLET, COATED ORAL at 22:27

## 2019-11-30 RX ADMIN — Medication 10 ML: at 14:28

## 2019-11-30 RX ADMIN — INSULIN LISPRO 7 UNITS: 100 INJECTION, SOLUTION INTRAVENOUS; SUBCUTANEOUS at 16:49

## 2019-11-30 RX ADMIN — HUMAN INSULIN 60 UNITS: 100 INJECTION, SUSPENSION SUBCUTANEOUS at 09:44

## 2019-11-30 RX ADMIN — Medication 10 ML: at 22:27

## 2019-11-30 RX ADMIN — INSULIN LISPRO 3 UNITS: 100 INJECTION, SOLUTION INTRAVENOUS; SUBCUTANEOUS at 22:25

## 2019-11-30 RX ADMIN — METOPROLOL TARTRATE 12.5 MG: 25 TABLET ORAL at 22:26

## 2019-11-30 RX ADMIN — HUMAN INSULIN 6 UNITS: 100 INJECTION, SOLUTION SUBCUTANEOUS at 09:44

## 2019-11-30 RX ADMIN — TICAGRELOR 90 MG: 90 TABLET ORAL at 13:01

## 2019-11-30 RX ADMIN — ASPIRIN 81 MG: 81 TABLET, COATED ORAL at 09:42

## 2019-11-30 RX ADMIN — HUMAN INSULIN 6 UNITS: 100 INJECTION, SOLUTION SUBCUTANEOUS at 13:00

## 2019-11-30 RX ADMIN — METOPROLOL TARTRATE 12.5 MG: 25 TABLET ORAL at 09:43

## 2019-11-30 RX ADMIN — TICAGRELOR 90 MG: 90 TABLET ORAL at 22:26

## 2019-11-30 RX ADMIN — INSULIN LISPRO 2 UNITS: 100 INJECTION, SOLUTION INTRAVENOUS; SUBCUTANEOUS at 09:44

## 2019-11-30 RX ADMIN — BUSPIRONE HYDROCHLORIDE 15 MG: 10 TABLET ORAL at 16:52

## 2019-11-30 RX ADMIN — ENOXAPARIN SODIUM 40 MG: 40 INJECTION SUBCUTANEOUS at 05:28

## 2019-11-30 RX ADMIN — LEVOTHYROXINE SODIUM 100 MCG: 75 TABLET ORAL at 05:25

## 2019-11-30 RX ADMIN — HUMAN INSULIN 80 UNITS: 100 INJECTION, SUSPENSION SUBCUTANEOUS at 16:50

## 2019-11-30 NOTE — CARDIO/PULMONARY
Cardiac Rehab Note: chart review     Consult has been acknowledged     Smoking history assessed. Patient is a former smoker. EF 65%  on 11/29/193echo per     CAD education folder, with heart heathy diet, warning signs, heart facts, catheterization brochure, and out patient cardiac rehab program provided to Terrie Rai on 11/30/19                                              Educated using teach back method. Reviewed CAD diagnosis definition and purpose of intervention. Discussed risk factors for CAD to include the following: family history, elevated BMI, hyperlipidemia, hypertension, diabetes, and stress. Patient is diabetic and stated he started a month or so ago being \"better\" about doing what he is suppose to do. Discussed Heart Healthy/Low Sodium (less than 2000 mg) diet. Reviewed the importance of medication compliance, follow up appointments with cardiologist, signs and symptoms of angina, and what to report to physician after discharge. Emphasized the value of cardiac rehab. Discussed Cardiac Rehab Program format, benefits, and encouraged enrollment to assist with risk modification and management. Patient will have another cath on Monday. Nursing indicated that patient BG still high/variable. Patient indicated he is eating food in addition to his meal trays. Encouraged patient finish meal trays but also asked patient to note any additional food/snacks/drinks he is consuming and coordinate intake with nursing for better BG management. Asked nursing about dietary/DTC consult visit. Terrie Cecelia verbalized understanding with questions answered.   Jany Mejia RN

## 2019-11-30 NOTE — PROGRESS NOTES
Hospitalist Progress Note    NAME: Joaquin Tapia   :  1980   MRN:  [id number]       Assessment / Plan:  Unstable angina   S/p cardiac cath with PCI LAD and LCX on   Unsuccessful PCI of the RCA   Pt came in with CP and abnormal Outpatient stress test   Underwent cardiac cath with stent placement on LAD and LCX . RCA completely occluded , unable to access . Plan is to return to cath lab next week to try to stent RCA through femoral access  C/w asa , brilinta , statin , BB , hold ACEI because SERGE  2D echo showed EF 61%     Hypertension  BP soft , decrease BB dose , hold ACEI        SERGE resolved   Creat 1.33> 1.47> 1.27  Stop IVF     DM2 with hyperglycemia   BS > 300  - home dose regimen  novolin N and humulin restarted at 1/2 dose , increase to full dose   C/w SSI , diabetic diet   Hba1c 10%     Hyperlipidemia  Depression   Hypothyroidism   Migraines   -Continue with statin , c/w zoloft   - c/w synthroid   - c/w prn Fioricet   LDL 0.47      Morbid obesity : BMI > 35 with complications DM2 HTN   Code Status: Full   Surrogate Decision Maker:Arnaud Cervantes     DVT Prophylaxis: Lovenox   GI Prophylaxis: not indicated      30.0 - 39.9 Obese / Body mass index is 37.8 kg/m². Subjective:     Chief Complaint / Reason for Physician Visit  Fu unstable angina. Reports no acute complaints . Discussed with RN events overnight. Review of Systems:  Symptom Y/N Comments  Symptom Y/N Comments   Fever/Chills n   Chest Pain n    Poor Appetite    Edema     Cough    Abdominal Pain n    Sputum    Joint Pain     SOB/COLE n   Pruritis/Rash     Nausea/vomit n   Tolerating PT/OT     Diarrhea n   Tolerating Diet y    Constipation    Other       Could NOT obtain due to:      Objective:     VITALS:   Last 24hrs VS reviewed since prior progress note.  Most recent are:  Patient Vitals for the past 24 hrs:   Temp Pulse Resp BP SpO2   19 0359 97.9 °F (36.6 °C) 69 18 138/63 97 %   19 0026 97.7 °F (36.5 °C) 78 17 117/50 98 %   11/29/19 2100 -- 76 -- 126/67 94 %   11/29/19 2001 -- 71 -- -- 96 %   11/29/19 2000 -- -- -- 131/68 --   11/29/19 1901 97.5 °F (36.4 °C) 70 18 116/54 96 %   11/29/19 1800 -- 74 17 126/74 98 %   11/29/19 1701 -- 77 18 104/52 97 %   11/29/19 1630 -- 82 17 121/71 95 %   11/29/19 1614 -- 77 -- -- 96 %   11/29/19 1600 -- 66 -- 151/87 97 %   11/29/19 1530 -- 68 -- 143/79 97 %   11/29/19 1515 -- 74 -- 145/72 96 %   11/29/19 1500 -- 73 18 151/71 98 %   11/29/19 1451 -- -- -- 145/67 --   11/29/19 1445 -- 74 18 128/75 98 %   11/29/19 1430 -- 75 -- 134/70 97 %   11/29/19 1415 -- 77 -- 116/71 98 %   11/29/19 1400 -- -- -- (!) 151/93 97 %   11/29/19 1345 -- 71 -- 147/78 98 %   11/29/19 1330 -- 79 -- 153/83 96 %   11/29/19 1315 -- 69 -- 164/70 97 %   11/29/19 1300 -- 77 -- (!) 143/106 95 %   11/29/19 1245 -- 72 -- 122/75 97 %   11/29/19 1155 -- 74 -- 106/60 97 %   11/29/19 1145 -- 73 -- 102/56 97 %   11/29/19 1130 -- -- -- 96/60 --   11/29/19 1125 -- 67 -- 91/57 93 %   11/29/19 1115 -- -- -- 107/57 --   11/29/19 1100 -- 72 -- 111/69 96 %   11/29/19 1050 97.8 °F (36.6 °C) 69 17 105/71 95 %   11/29/19 1045 -- -- -- 104/65 --   11/29/19 1038 97.7 °F (36.5 °C) 68 16 120/64 96 %       Intake/Output Summary (Last 24 hours) at 11/30/2019 0803  Last data filed at 11/29/2019 1330  Gross per 24 hour   Intake 360 ml   Output --   Net 360 ml        PHYSICAL EXAM:  General: WD, WN. Alert, cooperative, no acute distress    EENT:  EOMI. Anicteric sclerae. MMM  Resp:  CTA bilaterally, no wheezing or rales. No accessory muscle use  CV:  Regular  rhythm,  No edema  GI:  Soft, Non distended, Non tender.  +Bowel sounds  Neurologic:  Alert and oriented X 3, normal speech,   Psych:   Good insight. Not anxious nor agitated  Skin:  No rashes.   No jaundice    Reviewed most current lab test results and cultures  YES  Reviewed most current radiology test results   YES  Review and summation of old records today    NO  Reviewed patient's current orders and MAR    YES  PMH/SH reviewed - no change compared to H&P  ________________________________________________________________________  Care Plan discussed with:    Comments   Patient x    Family      RN x    Care Manager     Consultant                        Multidiciplinary team rounds were held today with , nursing, pharmacist and clinical coordinator. Patient's plan of care was discussed; medications were reviewed and discharge planning was addressed. ________________________________________________________________________  Total NON critical care TIME:  35   Minutes    Total CRITICAL CARE TIME Spent:   Minutes non procedure based      Comments   >50% of visit spent in counseling and coordination of care x    ________________________________________________________________________  Jared Harris MD     Procedures: see electronic medical records for all procedures/Xrays and details which were not copied into this note but were reviewed prior to creation of Plan. LABS:  I reviewed today's most current labs and imaging studies.   Pertinent labs include:  Recent Labs     11/30/19 0353 11/29/19 0358 11/28/19 0231   WBC 10.9 8.9 11.3*   HGB 14.1 13.0 14.7   HCT 42.3 40.1 44.5    187 214     Recent Labs     11/30/19 0353 11/29/19 0358 11/28/19 0231    136 137   K 3.7 5.0 3.8    104 103   CO2 27 26 27   GLU 73 370* 164*   BUN 18 21* 19   CREA 1.27 1.47* 1.33*   CA 9.2 8.5 9.4   ALB  --   --  3.8   TBILI  --   --  0.4   SGOT  --   --  21   ALT  --   --  47       Signed: Jared Harris MD

## 2019-12-01 LAB
ANION GAP SERPL CALC-SCNC: 8 MMOL/L (ref 5–15)
BUN SERPL-MCNC: 17 MG/DL (ref 6–20)
BUN/CREAT SERPL: 15 (ref 12–20)
CALCIUM SERPL-MCNC: 8.8 MG/DL (ref 8.5–10.1)
CHLORIDE SERPL-SCNC: 108 MMOL/L (ref 97–108)
CO2 SERPL-SCNC: 26 MMOL/L (ref 21–32)
CREAT SERPL-MCNC: 1.15 MG/DL (ref 0.7–1.3)
ERYTHROCYTE [DISTWIDTH] IN BLOOD BY AUTOMATED COUNT: 13.2 % (ref 11.5–14.5)
GLUCOSE BLD STRIP.AUTO-MCNC: 100 MG/DL (ref 65–100)
GLUCOSE BLD STRIP.AUTO-MCNC: 148 MG/DL (ref 65–100)
GLUCOSE BLD STRIP.AUTO-MCNC: 193 MG/DL (ref 65–100)
GLUCOSE BLD STRIP.AUTO-MCNC: 236 MG/DL (ref 65–100)
GLUCOSE BLD STRIP.AUTO-MCNC: 61 MG/DL (ref 65–100)
GLUCOSE BLD STRIP.AUTO-MCNC: 98 MG/DL (ref 65–100)
GLUCOSE SERPL-MCNC: 67 MG/DL (ref 65–100)
HCT VFR BLD AUTO: 40.9 % (ref 36.6–50.3)
HGB BLD-MCNC: 13.8 G/DL (ref 12.1–17)
MCH RBC QN AUTO: 28.9 PG (ref 26–34)
MCHC RBC AUTO-ENTMCNC: 33.7 G/DL (ref 30–36.5)
MCV RBC AUTO: 85.7 FL (ref 80–99)
NRBC # BLD: 0 K/UL (ref 0–0.01)
NRBC BLD-RTO: 0 PER 100 WBC
PLATELET # BLD AUTO: 202 K/UL (ref 150–400)
PMV BLD AUTO: 10.1 FL (ref 8.9–12.9)
POTASSIUM SERPL-SCNC: 3.4 MMOL/L (ref 3.5–5.1)
RBC # BLD AUTO: 4.77 M/UL (ref 4.1–5.7)
SERVICE CMNT-IMP: ABNORMAL
SERVICE CMNT-IMP: NORMAL
SERVICE CMNT-IMP: NORMAL
SODIUM SERPL-SCNC: 142 MMOL/L (ref 136–145)
WBC # BLD AUTO: 9.6 K/UL (ref 4.1–11.1)

## 2019-12-01 PROCEDURE — 82962 GLUCOSE BLOOD TEST: CPT

## 2019-12-01 PROCEDURE — 74011250637 HC RX REV CODE- 250/637: Performed by: INTERNAL MEDICINE

## 2019-12-01 PROCEDURE — 74011636637 HC RX REV CODE- 636/637: Performed by: INTERNAL MEDICINE

## 2019-12-01 PROCEDURE — 80048 BASIC METABOLIC PNL TOTAL CA: CPT

## 2019-12-01 PROCEDURE — 36415 COLL VENOUS BLD VENIPUNCTURE: CPT

## 2019-12-01 PROCEDURE — 85027 COMPLETE CBC AUTOMATED: CPT

## 2019-12-01 PROCEDURE — 65660000000 HC RM CCU STEPDOWN

## 2019-12-01 PROCEDURE — 74011250636 HC RX REV CODE- 250/636: Performed by: INTERNAL MEDICINE

## 2019-12-01 RX ORDER — POTASSIUM CHLORIDE 750 MG/1
40 TABLET, FILM COATED, EXTENDED RELEASE ORAL ONCE
Status: COMPLETED | OUTPATIENT
Start: 2019-12-01 | End: 2019-12-01

## 2019-12-01 RX ORDER — SODIUM CHLORIDE 9 MG/ML
100 INJECTION, SOLUTION INTRAVENOUS CONTINUOUS
Status: DISCONTINUED | OUTPATIENT
Start: 2019-12-02 | End: 2019-12-03 | Stop reason: HOSPADM

## 2019-12-01 RX ADMIN — METOPROLOL TARTRATE 12.5 MG: 25 TABLET ORAL at 20:42

## 2019-12-01 RX ADMIN — FENTANYL CITRATE 25 MCG: 50 INJECTION, SOLUTION INTRAMUSCULAR; INTRAVENOUS at 15:31

## 2019-12-01 RX ADMIN — INSULIN LISPRO 3 UNITS: 100 INJECTION, SOLUTION INTRAVENOUS; SUBCUTANEOUS at 17:04

## 2019-12-01 RX ADMIN — Medication 10 ML: at 13:47

## 2019-12-01 RX ADMIN — INSULIN LISPRO 2 UNITS: 100 INJECTION, SOLUTION INTRAVENOUS; SUBCUTANEOUS at 08:51

## 2019-12-01 RX ADMIN — HUMAN INSULIN 80 UNITS: 100 INJECTION, SUSPENSION SUBCUTANEOUS at 17:05

## 2019-12-01 RX ADMIN — TICAGRELOR 90 MG: 90 TABLET ORAL at 10:50

## 2019-12-01 RX ADMIN — HUMAN INSULIN 10 UNITS: 100 INJECTION, SOLUTION SUBCUTANEOUS at 13:46

## 2019-12-01 RX ADMIN — LEVOTHYROXINE SODIUM 100 MCG: 75 TABLET ORAL at 05:32

## 2019-12-01 RX ADMIN — PANTOPRAZOLE SODIUM 40 MG: 40 TABLET, DELAYED RELEASE ORAL at 08:43

## 2019-12-01 RX ADMIN — METOPROLOL TARTRATE 12.5 MG: 25 TABLET ORAL at 08:43

## 2019-12-01 RX ADMIN — Medication 10 ML: at 22:17

## 2019-12-01 RX ADMIN — HUMAN INSULIN 80 UNITS: 100 INJECTION, SUSPENSION SUBCUTANEOUS at 10:49

## 2019-12-01 RX ADMIN — HUMAN INSULIN 10 UNITS: 100 INJECTION, SOLUTION SUBCUTANEOUS at 08:51

## 2019-12-01 RX ADMIN — ASPIRIN 81 MG: 81 TABLET, COATED ORAL at 08:44

## 2019-12-01 RX ADMIN — FENOFIBRATE 145 MG: 145 TABLET ORAL at 08:43

## 2019-12-01 RX ADMIN — HUMAN INSULIN 10 UNITS: 100 INJECTION, SOLUTION SUBCUTANEOUS at 17:04

## 2019-12-01 RX ADMIN — BUSPIRONE HYDROCHLORIDE 15 MG: 10 TABLET ORAL at 08:44

## 2019-12-01 RX ADMIN — BUTALBITAL, ACETAMINOPHEN AND CAFFEINE 1 TABLET: 50; 325; 40 TABLET ORAL at 14:27

## 2019-12-01 RX ADMIN — FENTANYL CITRATE 25 MCG: 50 INJECTION, SOLUTION INTRAMUSCULAR; INTRAVENOUS at 20:41

## 2019-12-01 RX ADMIN — SERTRALINE HYDROCHLORIDE 100 MG: 50 TABLET ORAL at 08:43

## 2019-12-01 RX ADMIN — BUSPIRONE HYDROCHLORIDE 15 MG: 10 TABLET ORAL at 17:04

## 2019-12-01 RX ADMIN — POTASSIUM CHLORIDE 40 MEQ: 750 TABLET, FILM COATED, EXTENDED RELEASE ORAL at 08:43

## 2019-12-01 RX ADMIN — ENOXAPARIN SODIUM 40 MG: 40 INJECTION SUBCUTANEOUS at 05:32

## 2019-12-01 RX ADMIN — ROSUVASTATIN CALCIUM 20 MG: 10 TABLET, COATED ORAL at 22:17

## 2019-12-01 RX ADMIN — TICAGRELOR 90 MG: 90 TABLET ORAL at 23:00

## 2019-12-01 RX ADMIN — FENTANYL CITRATE 25 MCG: 50 INJECTION, SOLUTION INTRAMUSCULAR; INTRAVENOUS at 07:35

## 2019-12-01 RX ADMIN — INSULIN LISPRO 2 UNITS: 100 INJECTION, SOLUTION INTRAVENOUS; SUBCUTANEOUS at 13:46

## 2019-12-01 RX ADMIN — Medication 10 ML: at 05:33

## 2019-12-01 RX ADMIN — FENTANYL CITRATE 25 MCG: 50 INJECTION, SOLUTION INTRAMUSCULAR; INTRAVENOUS at 10:55

## 2019-12-01 NOTE — PROGRESS NOTES
1900 - Bedside report from Ruth. NSR 70-80's. No current complaints. Trace edema. 2220 - . Since dinner he had a regular icey 120cc and 8 saltines w 2 tablespoons of peanut butter - all of which he has had in the last 45 minutes. Pt ed regarding regular sugar items (icey cups). 0700- No complaints all shift. bedside report to RN. NSR.   Pt would benefit from DTC education

## 2019-12-01 NOTE — PROGRESS NOTES
Progress Note      12/1/2019 9:52 AM  NAME: Sydni Dasilva   MRN:  [id number]   Admit Diagnosis: Chest pain [R07.9]; Unstable angina (HCC) [I20.0]     Assessment:     Severe Multivessel CAD on CATH   PCI of prox - mid LAD with JAMIL OG   PCI of Cx Marginal with ONX OG   RCA is total occlusion. LV EF  - 60%   Normal LV fx.     Echo normal LV function with EF 60%. No significant valve disease. Unstable Angina   HTN  Hyperlipidemia  Diabetes type 1  - onset age 8. Obesity   Family hx of CAD / MI. DJD   ? Cervical disc disease.         Has been on short term disability due to dizziness and lightheadedness.       His Father was just in hospital in October and had PCI . 12/1  No further chest pain at this time.           Plan: Will proceed with staged PCI of the occluded RCA tomorrow           [x]        High complexity decision making was performed    Subjective:     Sydni Dasilva denies chest pain, dyspnea. Discussed with RN events overnight. Patient Active Problem List   Diagnosis Code    DM [facility] mellitus) [facility] E11.9    Acquired hypothyroidism E03.9    Essential hypertension I10    Fibromyalgia M79.7    Microalbuminuria R80.9    Anxiety F41.9    Migraine without aura G43.009    Hypertriglyceridemia E78.1    Severe obesity (HCC) E66.01    Bilateral carotid artery stenosis I65.23    Transient ischemic attack involving left internal carotid artery G45.1    Chest pain R07.9    Unstable angina (HCC) I20.0       Review of Systems:    Symptom Y/N Comments  Symptom Y/N Comments   Fever/Chills N   Chest Pain N    Poor Appetite N   Edema N    Cough N   Abdominal Pain N    Sputum N   Joint Pain N    SOB/COLE N   Pruritis/Rash N    Nausea/vomit N   Tolerating PT/OT Y    Diarrhea N   Tolerating Diet Y    Constipation N   Other       Could NOT obtain due to:      Objective:      Physical Exam:    Last 24hrs VS reviewed since prior progress note.  Most recent are:    Visit Vitals  /65   Pulse 76   Temp 98.2 °F (36.8 °C)   Resp 17   Ht 5' 9\" (1.753 m)   Wt 116.1 kg (256 lb)   SpO2 98%   BMI 37.80 kg/m²       Intake/Output Summary (Last 24 hours) at 12/1/2019 3373  Last data filed at 11/30/2019 2320  Gross per 24 hour   Intake 700 ml   Output 800 ml   Net -100 ml        General Appearance: Well developed, well nourished, alert & oriented x 3,    no acute distress. Ears/Nose/Mouth/Throat: Hearing grossly normal.  Neck: Supple. Chest: Lungs clear to auscultation bilaterally. Cardiovascular: Regular rate and rhythm, S1S2 normal, no murmur. Abdomen: Soft, non-tender, bowel sounds are active. Extremities: No edema bilaterally. Skin: Warm and dry. PMH/SH reviewed - no change compared to H&P    Data Review    Telemetry: normal sinus rhythm     Lab Data Personally Reviewed:    Recent Labs     12/01/19  0530 11/30/19  0353   WBC 9.6 10.9   HGB 13.8 14.1   HCT 40.9 42.3    208   LABRCNT(INR:3,PTP:3,APTT:3,)  Recent Labs     12/01/19  0530 11/30/19  0353 11/29/19  0358    141 136   K 3.4* 3.7 5.0    108 104   CO2 26 27 26   BUN 17 18 21*   CREA 1.15 1.27 1.47*   GLU 67 73 370*   CA 8.8 9.2 8.5   LABRCNT(CPK:3,CpKMB:3,ckndx:3,troiq:3)  Lab Results   Component Value Date/Time    Cholesterol, total 124 11/30/2019 03:53 AM    HDL Cholesterol 31 11/30/2019 03:53 AM    LDL,Direct 151 (H) 08/24/2014 02:16 AM    LDL, calculated 47.2 11/30/2019 03:53 AM    Triglyceride 229 (H) 11/30/2019 03:53 AM    CHOL/HDL Ratio 4.0 11/30/2019 03:53 AM   LABRCNT(sgot:3,gpt:3,ap:3,tbiL:3,TP:3,ALB:3,GLOB:3,ggt:3,aml:3,amyp:3,lpse:3,hlpse:3)No results for input(s): PH, PCO2, PO2 in the last 72 hours.   Lab Results   Component Value Date/Time    Cholesterol, total 124 11/30/2019 03:53 AM    HDL Cholesterol 31 11/30/2019 03:53 AM    LDL,Direct 151 (H) 08/24/2014 02:16 AM    LDL, calculated 47.2 11/30/2019 03:53 AM    Triglyceride 229 (H) 11/30/2019 03:53 AM    CHOL/HDL Ratio 4.0 11/30/2019 03:53 AM   [facility] HospitalJonathan Jones MD  No results for input(s): PH, PCO2, PO2 in the last 72 hours.     Medications Personally Reviewed:    Current Facility-Administered Medications   Medication Dose Route Frequency    insulin NPH (NOVOLIN N, HUMULIN N) injection 80 Units  80 Units SubCUTAneous ACB&D    insulin regular (NOVOLIN R, HUMULIN R) injection 10 Units  10 Units SubCUTAneous TIDAC    butalbital-acetaminophen-caffeine (FIORICET, ESGIC) -40 mg per tablet 1 Tab  1 Tab Oral Q6H PRN    LORazepam (ATIVAN) tablet 0.5 mg  0.5 mg Oral DAILY PRN    ticagrelor (BRILINTA) tablet 90 mg  90 mg Oral Q12H    sodium chloride (NS) flush 5-40 mL  5-40 mL IntraVENous Q8H    sodium chloride (NS) flush 5-40 mL  5-40 mL IntraVENous PRN    zolpidem (AMBIEN) tablet 5 mg  5 mg Oral QHS PRN    pantoprazole (PROTONIX) tablet 40 mg  40 mg Oral ACB    calcium carbonate (TUMS) chewable tablet 200 mg [elemental]  200 mg Oral TID PRN    metoprolol tartrate (LOPRESSOR) tablet 12.5 mg  12.5 mg Oral Q12H    sodium chloride (NS) flush 5-40 mL  5-40 mL IntraVENous Q8H    sodium chloride (NS) flush 5-40 mL  5-40 mL IntraVENous PRN    ondansetron (ZOFRAN) injection 4 mg  4 mg IntraVENous Q6H PRN    bisacodyl (DULCOLAX) tablet 5 mg  5 mg Oral DAILY PRN    enoxaparin (LOVENOX) injection 40 mg  40 mg SubCUTAneous Q24H    glucose chewable tablet 16 g  4 Tab Oral PRN    dextrose (D50W) injection syrg 12.5-25 g  25-50 mL IntraVENous PRN    glucagon (GLUCAGEN) injection 1 mg  1 mg IntraMUSCular PRN    insulin lispro (HUMALOG) injection   SubCUTAneous AC&HS    rosuvastatin (CRESTOR) tablet 20 mg  20 mg Oral QHS    fenofibrate nanocrystallized (TRICOR) tablet 145 mg  145 mg Oral DAILY    levothyroxine (SYNTHROID) tablet 100 mcg  100 mcg Oral 6am    sertraline (ZOLOFT) tablet 100 mg  100 mg Oral DAILY    cyclobenzaprine (FLEXERIL) tablet 10 mg  10 mg Oral TID PRN    aspirin delayed-release tablet 81 mg  81 mg Oral DAILY    nitroglycerin (NITROSTAT) tablet 0.4 mg  0.4 mg SubLINGual Q5MIN PRN    fentaNYL citrate (PF) injection 25 mcg  25 mcg IntraVENous Q4H PRN    busPIRone (BUSPAR) tablet 15 mg  15 mg Oral BID         Isabel Perez MD

## 2019-12-01 NOTE — PROGRESS NOTES
Hospitalist Progress Note    NAME: Arian Armstrong   :  1980   MRN:  [id number]       Assessment / Plan:  Unstable angina   S/p cardiac cath with PCI LAD and LCX on   Unsuccessful PCI of the RCA   Pt came in with CP and abnormal Outpatient stress test   Underwent cardiac cath with stent placement on LAD and LCX . RCA completely occluded , unable to access . Plan is to return to cath labon  to try to stent RCA through femoral access:staged PCI of the occluded RCA   C/w asa , brilinta , statin , BB , hold ACEI because SERGE  2D echo showed EF 61%     Hypertension  BP soft , decrease BB dose , hold ACEI      SERGE resolved   Creat 1.33> 1.47> 1.27  Stop IVF     DM2 with hyperglycemia   BS more controlled   - home dose regimen  novolin N and humulin restarted at 1/2 dose , increase to full dose   C/w SSI , diabetic diet   Hba1c 10%     Hyperlipidemia  Depression   Hypothyroidism   Migraines   -Continue with statin , c/w zoloft   - c/w synthroid   - c/w prn Fioricet   LDL 0.47      Morbid obesity : BMI > 35 with complications DM2 HTN   Code Status: Full   Surrogate Decision Maker:Arnaud Cervantes     DVT Prophylaxis: Lovenox   GI Prophylaxis: not indicated      30.0 - 39.9 Obese / Body mass index is 37.8 kg/m². Subjective:     Chief Complaint / Reason for Physician Visit  Fu unstable angina. Reports no acute complaints . Discussed with RN events overnight. Review of Systems:  Symptom Y/N Comments  Symptom Y/N Comments   Fever/Chills n   Chest Pain n    Poor Appetite    Edema     Cough    Abdominal Pain n    Sputum    Joint Pain     SOB/COLE n   Pruritis/Rash     Nausea/vomit n   Tolerating PT/OT     Diarrhea n   Tolerating Diet y    Constipation    Other       Could NOT obtain due to:      Objective:     VITALS:   Last 24hrs VS reviewed since prior progress note.  Most recent are:  Patient Vitals for the past 24 hrs:   Temp Pulse Resp BP SpO2   19 0710 98.2 °F (36.8 °C) 76 17 132/65 98 %   12/01/19 0359 98.7 °F (37.1 °C) 63 16 137/72 97 %   11/30/19 2320 98 °F (36.7 °C) 84 16 142/53 97 %   11/30/19 1901 98.4 °F (36.9 °C) 81 16 140/71 100 %   11/30/19 1514 98.1 °F (36.7 °C) 78 18 140/60 97 %   11/30/19 1112 98.1 °F (36.7 °C) 82 18 115/61 98 %       Intake/Output Summary (Last 24 hours) at 12/1/2019 0819  Last data filed at 11/30/2019 2320  Gross per 24 hour   Intake 700 ml   Output 800 ml   Net -100 ml        PHYSICAL EXAM:  General: WD, WN. Alert, cooperative, no acute distress    EENT:  EOMI. Anicteric sclerae. MMM  Resp:  CTA bilaterally, no wheezing or rales. No accessory muscle use  CV:  Regular  rhythm,  No edema  GI:  Soft, Non distended, Non tender.  +Bowel sounds  Neurologic:  Alert and oriented X 3, normal speech,   Psych:   Good insight. Not anxious nor agitated  Skin:  No rashes. No jaundice    Reviewed most current lab test results and cultures  YES  Reviewed most current radiology test results   YES  Review and summation of old records today    NO  Reviewed patient's current orders and MAR    YES  PMH/ reviewed - no change compared to H&P  ________________________________________________________________________  Care Plan discussed with:    Comments   Patient x    Family      RN x    Care Manager     Consultant                        Multidiciplinary team rounds were held today with , nursing, pharmacist and clinical coordinator. Patient's plan of care was discussed; medications were reviewed and discharge planning was addressed.      ________________________________________________________________________  Total NON critical care TIME:  25   Minutes    Total CRITICAL CARE TIME Spent:   Minutes non procedure based      Comments   >50% of visit spent in counseling and coordination of care x    ________________________________________________________________________  Shamar Zamora MD     Procedures: see electronic medical records for all procedures/Xrays and details which were not copied into this note but were reviewed prior to creation of Plan. LABS:  I reviewed today's most current labs and imaging studies.   Pertinent labs include:  Recent Labs     12/01/19 0530 11/30/19 0353 11/29/19 0358   WBC 9.6 10.9 8.9   HGB 13.8 14.1 13.0   HCT 40.9 42.3 40.1    208 187     Recent Labs     12/01/19 0530 11/30/19 0353 11/29/19 0358    141 136   K 3.4* 3.7 5.0    108 104   CO2 26 27 26   GLU 67 73 370*   BUN 17 18 21*   CREA 1.15 1.27 1.47*   CA 8.8 9.2 8.5       Signed: Atiya Gudino MD

## 2019-12-02 ENCOUNTER — TELEPHONE (OUTPATIENT)
Dept: ENDOCRINOLOGY | Age: 39
End: 2019-12-02

## 2019-12-02 LAB
ANION GAP SERPL CALC-SCNC: 7 MMOL/L (ref 5–15)
ATRIAL RATE: 73 BPM
BUN SERPL-MCNC: 18 MG/DL (ref 6–20)
BUN/CREAT SERPL: 15 (ref 12–20)
CALCIUM SERPL-MCNC: 8.6 MG/DL (ref 8.5–10.1)
CALCULATED P AXIS, ECG09: 46 DEGREES
CALCULATED R AXIS, ECG10: 19 DEGREES
CALCULATED T AXIS, ECG11: 63 DEGREES
CHLORIDE SERPL-SCNC: 105 MMOL/L (ref 97–108)
CO2 SERPL-SCNC: 26 MMOL/L (ref 21–32)
CREAT SERPL-MCNC: 1.2 MG/DL (ref 0.7–1.3)
DIAGNOSIS, 93000: NORMAL
ERYTHROCYTE [DISTWIDTH] IN BLOOD BY AUTOMATED COUNT: 13.2 % (ref 11.5–14.5)
GLUCOSE BLD STRIP.AUTO-MCNC: 147 MG/DL (ref 65–100)
GLUCOSE BLD STRIP.AUTO-MCNC: 151 MG/DL (ref 65–100)
GLUCOSE BLD STRIP.AUTO-MCNC: 163 MG/DL (ref 65–100)
GLUCOSE BLD STRIP.AUTO-MCNC: 207 MG/DL (ref 65–100)
GLUCOSE SERPL-MCNC: 191 MG/DL (ref 65–100)
HCT VFR BLD AUTO: 40.8 % (ref 36.6–50.3)
HGB BLD-MCNC: 13.8 G/DL (ref 12.1–17)
MCH RBC QN AUTO: 29.2 PG (ref 26–34)
MCHC RBC AUTO-ENTMCNC: 33.8 G/DL (ref 30–36.5)
MCV RBC AUTO: 86.3 FL (ref 80–99)
NRBC # BLD: 0 K/UL (ref 0–0.01)
NRBC BLD-RTO: 0 PER 100 WBC
P-R INTERVAL, ECG05: 146 MS
PLATELET # BLD AUTO: 197 K/UL (ref 150–400)
PMV BLD AUTO: 10.5 FL (ref 8.9–12.9)
POTASSIUM SERPL-SCNC: 3.7 MMOL/L (ref 3.5–5.1)
Q-T INTERVAL, ECG07: 356 MS
QRS DURATION, ECG06: 92 MS
QTC CALCULATION (BEZET), ECG08: 392 MS
RBC # BLD AUTO: 4.73 M/UL (ref 4.1–5.7)
SERVICE CMNT-IMP: ABNORMAL
SODIUM SERPL-SCNC: 138 MMOL/L (ref 136–145)
VENTRICULAR RATE, ECG03: 73 BPM
WBC # BLD AUTO: 10.2 K/UL (ref 4.1–11.1)

## 2019-12-02 PROCEDURE — C1725 CATH, TRANSLUMIN NON-LASER: HCPCS | Performed by: INTERNAL MEDICINE

## 2019-12-02 PROCEDURE — C1769 GUIDE WIRE: HCPCS | Performed by: INTERNAL MEDICINE

## 2019-12-02 PROCEDURE — 027034Z DILATION OF CORONARY ARTERY, ONE ARTERY WITH DRUG-ELUTING INTRALUMINAL DEVICE, PERCUTANEOUS APPROACH: ICD-10-PCS | Performed by: INTERNAL MEDICINE

## 2019-12-02 PROCEDURE — 74011250637 HC RX REV CODE- 250/637: Performed by: INTERNAL MEDICINE

## 2019-12-02 PROCEDURE — 82962 GLUCOSE BLOOD TEST: CPT

## 2019-12-02 PROCEDURE — 85347 COAGULATION TIME ACTIVATED: CPT

## 2019-12-02 PROCEDURE — C1874 STENT, COATED/COV W/DEL SYS: HCPCS | Performed by: INTERNAL MEDICINE

## 2019-12-02 PROCEDURE — 93454 CORONARY ARTERY ANGIO S&I: CPT | Performed by: INTERNAL MEDICINE

## 2019-12-02 PROCEDURE — 36415 COLL VENOUS BLD VENIPUNCTURE: CPT

## 2019-12-02 PROCEDURE — 80048 BASIC METABOLIC PNL TOTAL CA: CPT

## 2019-12-02 PROCEDURE — 65660000000 HC RM CCU STEPDOWN

## 2019-12-02 PROCEDURE — 92943 PRQ TRLUML REVSC CH OCC ANT: CPT | Performed by: INTERNAL MEDICINE

## 2019-12-02 PROCEDURE — 74011636320 HC RX REV CODE- 636/320: Performed by: INTERNAL MEDICINE

## 2019-12-02 PROCEDURE — 99152 MOD SED SAME PHYS/QHP 5/>YRS: CPT | Performed by: INTERNAL MEDICINE

## 2019-12-02 PROCEDURE — 74011250636 HC RX REV CODE- 250/636: Performed by: INTERNAL MEDICINE

## 2019-12-02 PROCEDURE — 74011000250 HC RX REV CODE- 250: Performed by: INTERNAL MEDICINE

## 2019-12-02 PROCEDURE — 77030029065 HC DRSG HEMO QCLOT ZMED -B: Performed by: INTERNAL MEDICINE

## 2019-12-02 PROCEDURE — C1760 CLOSURE DEV, VASC: HCPCS | Performed by: INTERNAL MEDICINE

## 2019-12-02 PROCEDURE — C1887 CATHETER, GUIDING: HCPCS | Performed by: INTERNAL MEDICINE

## 2019-12-02 PROCEDURE — 74011636637 HC RX REV CODE- 636/637: Performed by: INTERNAL MEDICINE

## 2019-12-02 PROCEDURE — C1894 INTRO/SHEATH, NON-LASER: HCPCS | Performed by: INTERNAL MEDICINE

## 2019-12-02 PROCEDURE — 99153 MOD SED SAME PHYS/QHP EA: CPT | Performed by: INTERNAL MEDICINE

## 2019-12-02 PROCEDURE — 77030003623 HC NDL PERC VASC TELE -C: Performed by: INTERNAL MEDICINE

## 2019-12-02 PROCEDURE — 85027 COMPLETE CBC AUTOMATED: CPT

## 2019-12-02 PROCEDURE — 93005 ELECTROCARDIOGRAM TRACING: CPT

## 2019-12-02 PROCEDURE — 77030019697 HC SYR ANGI INFL MRTM -B: Performed by: INTERNAL MEDICINE

## 2019-12-02 DEVICE — STENT RONYX22534UX RESOLUTE ONYX 2.25X34
Type: IMPLANTABLE DEVICE | Status: FUNCTIONAL
Brand: RESOLUTE ONYX™

## 2019-12-02 DEVICE — STENT RONYX25038UX RESOLUTE ONYX 2.50X38
Type: IMPLANTABLE DEVICE | Status: FUNCTIONAL
Brand: RESOLUTE ONYX™

## 2019-12-02 DEVICE — STENT RONYX22526UX RESOLUTE ONYX 2.25X26
Type: IMPLANTABLE DEVICE | Status: FUNCTIONAL
Brand: RESOLUTE ONYX™

## 2019-12-02 RX ORDER — SODIUM CHLORIDE 0.9 % (FLUSH) 0.9 %
5-40 SYRINGE (ML) INJECTION AS NEEDED
Status: DISCONTINUED | OUTPATIENT
Start: 2019-12-02 | End: 2019-12-03 | Stop reason: HOSPADM

## 2019-12-02 RX ORDER — LORAZEPAM 2 MG/ML
INJECTION INTRAMUSCULAR AS NEEDED
Status: DISCONTINUED | OUTPATIENT
Start: 2019-12-02 | End: 2019-12-02 | Stop reason: HOSPADM

## 2019-12-02 RX ORDER — SODIUM CHLORIDE 9 MG/ML
100 INJECTION, SOLUTION INTRAVENOUS CONTINUOUS
Status: DISPENSED | OUTPATIENT
Start: 2019-12-02 | End: 2019-12-02

## 2019-12-02 RX ORDER — FENTANYL CITRATE 50 UG/ML
INJECTION, SOLUTION INTRAMUSCULAR; INTRAVENOUS AS NEEDED
Status: DISCONTINUED | OUTPATIENT
Start: 2019-12-02 | End: 2019-12-02 | Stop reason: HOSPADM

## 2019-12-02 RX ORDER — MIDAZOLAM HYDROCHLORIDE 1 MG/ML
INJECTION, SOLUTION INTRAMUSCULAR; INTRAVENOUS AS NEEDED
Status: DISCONTINUED | OUTPATIENT
Start: 2019-12-02 | End: 2019-12-02 | Stop reason: HOSPADM

## 2019-12-02 RX ORDER — ZOLPIDEM TARTRATE 5 MG/1
5 TABLET ORAL
Status: DISCONTINUED | OUTPATIENT
Start: 2019-12-02 | End: 2019-12-03 | Stop reason: HOSPADM

## 2019-12-02 RX ORDER — LIDOCAINE HYDROCHLORIDE 10 MG/ML
INJECTION INFILTRATION; PERINEURAL AS NEEDED
Status: DISCONTINUED | OUTPATIENT
Start: 2019-12-02 | End: 2019-12-02 | Stop reason: HOSPADM

## 2019-12-02 RX ORDER — HEPARIN SODIUM 200 [USP'U]/100ML
INJECTION, SOLUTION INTRAVENOUS
Status: COMPLETED | OUTPATIENT
Start: 2019-12-02 | End: 2019-12-02

## 2019-12-02 RX ORDER — SODIUM CHLORIDE 0.9 % (FLUSH) 0.9 %
5-40 SYRINGE (ML) INJECTION EVERY 8 HOURS
Status: DISCONTINUED | OUTPATIENT
Start: 2019-12-02 | End: 2019-12-03 | Stop reason: HOSPADM

## 2019-12-02 RX ORDER — HEPARIN SODIUM 1000 [USP'U]/ML
INJECTION, SOLUTION INTRAVENOUS; SUBCUTANEOUS AS NEEDED
Status: DISCONTINUED | OUTPATIENT
Start: 2019-12-02 | End: 2019-12-02 | Stop reason: HOSPADM

## 2019-12-02 RX ADMIN — Medication 10 ML: at 22:31

## 2019-12-02 RX ADMIN — PANTOPRAZOLE SODIUM 40 MG: 40 TABLET, DELAYED RELEASE ORAL at 08:01

## 2019-12-02 RX ADMIN — INSULIN LISPRO 3 UNITS: 100 INJECTION, SOLUTION INTRAVENOUS; SUBCUTANEOUS at 17:19

## 2019-12-02 RX ADMIN — FENTANYL CITRATE 25 MCG: 50 INJECTION, SOLUTION INTRAMUSCULAR; INTRAVENOUS at 00:41

## 2019-12-02 RX ADMIN — HUMAN INSULIN 10 UNITS: 100 INJECTION, SOLUTION SUBCUTANEOUS at 17:19

## 2019-12-02 RX ADMIN — LEVOTHYROXINE SODIUM 100 MCG: 75 TABLET ORAL at 06:17

## 2019-12-02 RX ADMIN — HUMAN INSULIN 10 UNITS: 100 INJECTION, SOLUTION SUBCUTANEOUS at 11:27

## 2019-12-02 RX ADMIN — HUMAN INSULIN 80 UNITS: 100 INJECTION, SUSPENSION SUBCUTANEOUS at 11:27

## 2019-12-02 RX ADMIN — INSULIN LISPRO 2 UNITS: 100 INJECTION, SOLUTION INTRAVENOUS; SUBCUTANEOUS at 08:01

## 2019-12-02 RX ADMIN — TICAGRELOR 90 MG: 90 TABLET ORAL at 22:30

## 2019-12-02 RX ADMIN — METOPROLOL TARTRATE 12.5 MG: 25 TABLET ORAL at 06:20

## 2019-12-02 RX ADMIN — BUSPIRONE HYDROCHLORIDE 15 MG: 10 TABLET ORAL at 08:01

## 2019-12-02 RX ADMIN — ROSUVASTATIN CALCIUM 20 MG: 10 TABLET, COATED ORAL at 22:37

## 2019-12-02 RX ADMIN — TICAGRELOR 90 MG: 90 TABLET ORAL at 08:01

## 2019-12-02 RX ADMIN — INSULIN LISPRO 2 UNITS: 100 INJECTION, SOLUTION INTRAVENOUS; SUBCUTANEOUS at 11:26

## 2019-12-02 RX ADMIN — SERTRALINE HYDROCHLORIDE 100 MG: 50 TABLET ORAL at 08:01

## 2019-12-02 RX ADMIN — FENOFIBRATE 145 MG: 145 TABLET ORAL at 08:01

## 2019-12-02 RX ADMIN — FENTANYL CITRATE 25 MCG: 50 INJECTION, SOLUTION INTRAMUSCULAR; INTRAVENOUS at 21:36

## 2019-12-02 RX ADMIN — FENTANYL CITRATE 25 MCG: 50 INJECTION, SOLUTION INTRAMUSCULAR; INTRAVENOUS at 12:46

## 2019-12-02 RX ADMIN — HUMAN INSULIN 80 UNITS: 100 INJECTION, SUSPENSION SUBCUTANEOUS at 17:19

## 2019-12-02 RX ADMIN — SODIUM CHLORIDE 100 ML/HR: 900 INJECTION, SOLUTION INTRAVENOUS at 06:16

## 2019-12-02 RX ADMIN — ASPIRIN 81 MG: 81 TABLET, COATED ORAL at 06:19

## 2019-12-02 RX ADMIN — Medication 10 ML: at 06:17

## 2019-12-02 RX ADMIN — Medication 10 ML: at 22:32

## 2019-12-02 RX ADMIN — BUSPIRONE HYDROCHLORIDE 15 MG: 10 TABLET ORAL at 17:19

## 2019-12-02 RX ADMIN — FENTANYL CITRATE 25 MCG: 50 INJECTION, SOLUTION INTRAMUSCULAR; INTRAVENOUS at 17:24

## 2019-12-02 RX ADMIN — METOPROLOL TARTRATE 12.5 MG: 25 TABLET ORAL at 22:30

## 2019-12-02 NOTE — PROGRESS NOTES
Hospitalist Progress Note    NAME: Nikhil Smyth   :  1980   MRN:  [id number]       Assessment / Plan:  Unstable angina   S/p cardiac cath with PCI LAD and LCX on   Unsuccessful PCI of the RCA on   S/p PCI of RCA on   Pt came in with CP and abnormal Outpatient stress test   Underwent cardiac cath with stent placement on LAD and LCX . RCA completely occluded , unable to access  On . Pt returned  to cath lab on   t and underwent staged PCI of the occluded RCA through femoral access   C/w asa , brilinta , statin , BB , hold ACEI because SERGE  2D echo showed EF 61%     Hypertension  C/w BB ,restart ACEI upon DC if creat remained stable      SERGE resolved   Creat 1.33> 1.47> 1.27      DM2 with hyperglycemia   BS more controlled   - home dose regimen  novolin N and humulin restarted at 1/2 dose , increased to full dose   C/w SSI , diabetic diet   Hba1c 10%     Hyperlipidemia  Depression   Hypothyroidism   Migraines   -Continue with statin , c/w zoloft   - c/w synthroid   - c/w prn Fioricet   LDL 0.47      Morbid obesity : BMI > 35 with complications DM2 HTN   Code Status: Full   Surrogate Decision Maker:Arnaud Cervantes     DVT Prophylaxis: Lovenox   GI Prophylaxis: not indicated      30.0 - 39.9 Obese / Body mass index is 37.31 kg/m². Subjective:     Chief Complaint / Reason for Physician Visit  Fu unstable angina. Seen before pt went to the cath lab . No acute complaints , just anxious . Discussed with RN events overnight. Review of Systems:  Symptom Y/N Comments  Symptom Y/N Comments   Fever/Chills n   Chest Pain n    Poor Appetite    Edema     Cough    Abdominal Pain n    Sputum    Joint Pain     SOB/COLE n   Pruritis/Rash     Nausea/vomit n   Tolerating PT/OT     Diarrhea n   Tolerating Diet  npo   Constipation    Other       Could NOT obtain due to:      Objective:     VITALS:   Last 24hrs VS reviewed since prior progress note.  Most recent are:  Patient Vitals for the past 24 hrs:   Temp Pulse Resp BP SpO2   12/02/19 0701 -- 81 -- -- --   12/02/19 0700 97.8 °F (36.6 °C) 81 18 124/73 97 %   12/02/19 0641 -- 85 -- 134/73 --   12/02/19 0347 97.7 °F (36.5 °C) 79 18 119/72 94 %   12/01/19 2226 -- 74 -- -- 96 %   12/01/19 2225 98.4 °F (36.9 °C) 74 18 115/70 96 %   12/01/19 2042 98.6 °F (37 °C) 88 18 145/66 95 %   12/01/19 2027 -- 84 -- -- 95 %   12/01/19 2026 -- -- -- 145/66 --   12/01/19 1511 98.2 °F (36.8 °C) 79 17 133/69 99 %   12/01/19 1141 98.4 °F (36.9 °C) 72 18 131/86 99 %       Intake/Output Summary (Last 24 hours) at 12/2/2019 0815  Last data filed at 12/2/2019 0730  Gross per 24 hour   Intake 200 ml   Output 600 ml   Net -400 ml        PHYSICAL EXAM:  General: WD, WN. Alert, cooperative, no acute distress    EENT:  EOMI. Anicteric sclerae. MMM  Resp:  CTA bilaterally, no wheezing or rales. No accessory muscle use  CV:  Regular  rhythm,  No edema  GI:  Soft, Non distended, Non tender.  +Bowel sounds  Neurologic:  Alert and oriented X 3, normal speech,   Psych:   Good insight. Not anxious nor agitated  Skin:  No rashes. No jaundice    Reviewed most current lab test results and cultures  YES  Reviewed most current radiology test results   YES  Review and summation of old records today    NO  Reviewed patient's current orders and MAR    YES  PMH/SH reviewed - no change compared to H&P  ________________________________________________________________________  Care Plan discussed with:    Comments   Patient x    Family      RN x    Care Manager     Consultant                        Multidiciplinary team rounds were held today with , nursing, pharmacist and clinical coordinator. Patient's plan of care was discussed; medications were reviewed and discharge planning was addressed.      ________________________________________________________________________  Total NON critical care TIME:  25   Minutes    Total CRITICAL CARE TIME Spent:   Minutes non procedure based Comments   >50% of visit spent in counseling and coordination of care x    ________________________________________________________________________  Tami Capps MD     Procedures: see electronic medical records for all procedures/Xrays and details which were not copied into this note but were reviewed prior to creation of Plan. LABS:  I reviewed today's most current labs and imaging studies.   Pertinent labs include:  Recent Labs     12/02/19 0343 12/01/19 0530 11/30/19  0353   WBC 10.2 9.6 10.9   HGB 13.8 13.8 14.1   HCT 40.8 40.9 42.3    202 208     Recent Labs     12/02/19 0343 12/01/19  0530 11/30/19  0353    142 141   K 3.7 3.4* 3.7    108 108   CO2 26 26 27   * 67 73   BUN 18 17 18   CREA 1.20 1.15 1.27   CA 8.6 8.8 9.2       Signed: Tami Capps MD

## 2019-12-02 NOTE — TELEPHONE ENCOUNTER
----- Message from Magaly Marquez sent at 12/2/2019  9:23 AM EST -----  Regarding: Dr. Susan Lim Message/Vendor Calls    Caller's first and last name: Cherelle Salgado       Reason for call: Pt's family member is calling asking for Patrick Briggs (Nurse to Dr. Selina Benson) to contact her back in regards to the Pt.        Callback required yes/no and why:Y      Best contact number(s):[phone]      Details to clarify the request:      Magaly Marquez

## 2019-12-02 NOTE — TELEPHONE ENCOUNTER
----- Message from Charissa  sent at 12/2/2019  2:40 PM EST -----  Regarding: /telephone  General Message/Vendor Calls    Caller's first and last name: Giovani Espinal. Reason for call: Pt's mother said the pt would like a call back from the nurse. Callback required yes/no and why: yes. Best contact number(s):[phone], [phone]. Details to clarify the request: The pt is currently admitted in [facility] with \"heart issues\".

## 2019-12-02 NOTE — PROGRESS NOTES
Problem: Unstable Angina/NSTEMI: Discharge Outcomes  Goal: *Hemodynamically stable  Outcome: Progressing Towards Goal  Goal: *Stable cardiac rhythm  Outcome: Progressing Towards Goal  Goal: *Lungs clear or at baseline  Outcome: Progressing Towards Goal  Goal: *Optimal pain control at patient's stated goal  Outcome: Progressing Towards Goal  Goal: *Identifies cardiac risk factors  Outcome: Progressing Towards Goal  Goal: *Verbalizes home exercise program, activity guidelines, cardiac precautions  Outcome: Progressing Towards Goal  Goal: *Verbalizes understanding and describes prescribed diet  Outcome: Progressing Towards Goal  Goal: *Verbalizes name, dosage, time, side effects, and number of days to continue medications  Outcome: Progressing Towards Goal  Goal: *Anxiety reduced or absent  Outcome: Progressing Towards Goal  Goal: *Understands and describes signs and symptoms to report to providers(Stroke Metric)  Outcome: Progressing Towards Goal  Goal: *Describes follow-up/return visits to physicians  Outcome: Progressing Towards Goal  Goal: *Describes available resources and support systems  Outcome: Progressing Towards Goal  Goal: *Influenza immunization  Outcome: Progressing Towards Goal  Goal: *Pneumococcal immunization  Outcome: Progressing Towards Goal  Goal: *Describes smoking cessation resources  Outcome: Progressing Towards Goal     Problem: Pain  Goal: *Control of Pain  Outcome: Progressing Towards Goal     Problem: Falls - Risk of  Goal: *Absence of Falls  Description  Document Josiah Fall Risk and appropriate interventions in the flowsheet.   Outcome: Progressing Towards Goal  Note: Fall Risk Interventions:            Medication Interventions: Patient to call before getting OOB                   Problem: Patient Education: Go to Patient Education Activity  Goal: Patient/Family Education  Outcome: Progressing Towards Goal     Problem: Breathing Pattern - Ineffective  Goal: *Absence of hypoxia  Outcome: Progressing Towards Goal     Problem: Patient Education: Go to Patient Education Activity  Goal: Patient/Family Education  Outcome: Progressing Towards Goal     Problem: Cath Lab Procedures: Discharge Outcomes  Goal: *Stable cardiac rhythm  Outcome: Progressing Towards Goal  Goal: *Hemodynamically stable  Outcome: Progressing Towards Goal  Goal: *Optimal pain control at patient's stated goal  Outcome: Progressing Towards Goal  Goal: *Pulses palpable, skin color within defined limits, skin temperature warm  Outcome: Progressing Towards Goal  Goal: *Lungs clear or at baseline  Outcome: Progressing Towards Goal  Goal: *Demonstrates ability to perform prescribed activity without shortness of breath or discomfort  Outcome: Progressing Towards Goal  Goal: *Verbalizes home exercise program, activity guidelines, cardiac precautions  Outcome: Progressing Towards Goal  Goal: *Verbalizes understanding and describes prescribed diet  Outcome: Progressing Towards Goal  Goal: *Verbalizes understanding and describes medication purposes and frequencies  Outcome: Progressing Towards Goal  Goal: *Identifies cardiac risk factors  Outcome: Progressing Towards Goal  Goal: *No signs and symptoms of infection or wound complications  Outcome: Progressing Towards Goal  Goal: *Anxiety reduced or absent  Outcome: Progressing Towards Goal  Goal: *Verbalizes and demonstrates incision care  Outcome: Progressing Towards Goal  Goal: *Understands and describes signs and symptoms to report to providers(Stroke Metric)  Outcome: Progressing Towards Goal  Goal: *Describes follow-up/return visits to physicians  Outcome: Progressing Towards Goal  Goal: *Describes available resources and support systems  Outcome: Progressing Towards Goal  Goal: *Influenza immunization  Outcome: Progressing Towards Goal  Goal: *Pneumococcal immunization  Outcome: Progressing Towards Goal     Problem: Pressure Injury - Risk of  Goal: *Prevention of pressure injury  Description  Document Sree Scale and appropriate interventions in the flowsheet. Outcome: Progressing Towards Goal  Note: Pressure Injury Interventions:             Activity Interventions: Chair cushion         Nutrition Interventions: Document food/fluid/supplement intake                     Problem: Patient Education: Go to Patient Education Activity  Goal: Patient/Family Education  Outcome: Progressing Towards Goal     Problem: Patient Education: Go to Patient Education Activity  Goal: Patient/Family Education  Outcome: Progressing Towards Goal     Problem: Cath Lab Procedures: Pre-Procedure  Goal: Off Pathway (Use only if patient is Off Pathway)  Outcome: Progressing Towards Goal  Goal: Activity/Safety  Outcome: Progressing Towards Goal  Goal: Consults, if ordered  Outcome: Progressing Towards Goal  Goal: Diagnostic Test/Procedures  Outcome: Progressing Towards Goal  Goal: Nutrition/Diet  Outcome: Progressing Towards Goal  Goal: Discharge Planning  Outcome: Progressing Towards Goal  Goal: Medications  Outcome: Progressing Towards Goal  Goal: Respiratory  Outcome: Progressing Towards Goal  Goal: Treatments/Interventions/Procedures  Outcome: Progressing Towards Goal  Goal: Psychosocial  Outcome: Progressing Towards Goal  Goal: *Verbalize description of procedure  Outcome: Progressing Towards Goal  Goal: *Consent signed  Outcome: Progressing Towards Goal     Problem: Diabetes Maintenance:Admission  Goal: Activity/Safety  Outcome: Progressing Towards Goal  Goal: Diagnostic Tests/Procedures  Outcome: Progressing Towards Goal  Goal: Nutrition  Outcome: Progressing Towards Goal  Goal: Medications  Outcome: Progressing Towards Goal  Goal: Treatments/Interventions/Procedures  Outcome: Progressing Towards Goal     Problem: Diabetes Maintenance:Ongoing  Goal: Activity/Safety  Outcome: Progressing Towards Goal  Goal: Nutrition  Outcome: Progressing Towards Goal  Goal: Medications  Outcome: Progressing Towards Goal  Goal: Treatments/Interventsions/Procedures  Outcome: Progressing Towards Goal  Goal: *Blood Glucose 80 to 180 md/dl  Outcome: Progressing Towards Goal     Problem: Diabetes Maintenance:Discharge Outcomes  Goal: *Describes follow-up/return visits to physicians  Outcome: Progressing Towards Goal  Goal: *Blood glucose at patient's target range or approaching  Outcome: Progressing Towards Goal  Goal: *Aware of nutrition guidelines  Outcome: Progressing Towards Goal  Goal: *Verbalizes information about medication  Description  Verbalizes name, dosage, time, side effects, and number of days to  continue medications.   Outcome: Progressing Towards Goal  Goal: *Describes goals, rules, symptoms, and treatments  Description  Describes blood glucose goals, monitoring, sick day rules,  hypo/hyperglycemia prevention, symptoms, and treatment  Outcome: Progressing Towards Goal  Goal: *Describes available outpatient diabetes resources and support systems  Outcome: Progressing Towards Goal

## 2019-12-02 NOTE — DIABETES MGMT
DTC note:   Consult received. Pt currently in cath lab. Please consider changing NPH to Q12hrs and changing regular insulin to humalog to prevent insulin stacking. Pt with hypoglycemia yesterday evening. DTC will f/u with pt for consult.   Thank you,  WICHO Ann, RN, [address]

## 2019-12-02 NOTE — PROGRESS NOTES
[facility]  Home  Follow up appointments    Chart review. Patient is scheduled for staged PCI of the occluded RCA  Today. CM will continue to follow for discharge needs.     Henrique Bell RN CM  Ext [phone]

## 2019-12-02 NOTE — PROGRESS NOTES
Bedside shift change report given to Leah Trivedi RN (oncoming nurse) by Christos Mcdonald RN (offgoing nurse). Report included the following information SBAR, Kardex, ED Summary, Procedure Summary, Intake/Output, MAR, Accordion, Recent Results, Med Rec Status, Cardiac Rhythm NSR, Alarm Parameters , Pre Procedure Checklist, Procedure Verification, Quality Measures and Dual Neuro Assessment.

## 2019-12-02 NOTE — DIABETES MGMT
[facility]    [facility] Consult Note    Recommendations/ Comments: Please consider changing NPH to Q12hrs and changing regular insulin to humalog to prevent insulin stacking. Pt with hypoglycemia yesterday evening. Current hospital DM medication: NPH 80 units ac b/d, regular 10 units ac tid plus humalog correction    Met with pt for consult. Pt reports he is followed by Dr. Jax Clarke. Pt reports he is planning on having neck surgery and it has been postponed due to his elevated a1c. He is working to decrease his a1c. He reports he had been letting his BG run higher due to being afraid of hypoglycemia. He reports he had 5-6 low BG less than 80 in the last month. He is monitoring his BG 3-5x/day and will often take his regular insulin each of those times. Discussed duration of regular insulin and risk of insulin stacking. Enc pt to only take regular insulin ac& hs unless otherwise directed by Dr. Jax Clarke. Pt treats his hypoglycemia with soda. Discussed tx of hypoglycemia. Discussed basic meal planning using plate method. Pt has f/u with Dr. Jax Clarke scheduled. Discussed infection risk and need for good BG control pre/post-op any surgery. Consult received for:  [x]             Assessment of home management                []      Medication Recommendations                []             Meter/monitoring     []             Insulin instruction     []             New diagnosis     []             Outpatient education     []             Insulin pump patient     []             Insulin infusion     []             DKA/HHS    Chart reviewed and initial evaluation complete on Sydni Dasilva. Patient is a 35 y.o. male with known Type 2 Diabetes on metformin 500mg ac b/d, NPH 80 units Qam, Qhs, regular SSI ac/hs- pt adjusts depending on po intake and BG level at home.     Assessed and instructed patient on the following:   ·  interpretation of lab results, blood sugar goals, complications of diabetes mellitus, hypoglycemia prevention and treatment, nutrition and referred to Diabetes Educator      Provided patient with the following: [x]             Diabetes Self Care Guide               [x]             Insulin education materials               []             CHO counting education materials               [x]             Outpatient DTC contact number               []             Glucometer               Patient was able to give return demonstration of    []       Glucometer    []       saline injection      with []     without []       assistance needed. []       Nurse to have patient self inject prior to discharge. Discussed with patient and/or family need for follow up appointment for diabetes management after discharge. A1c:   Lab Results   Component Value Date/Time    Hemoglobin A1c 10.1 (H) 10/22/2019 10:45 AM       Recent Glucose Results:   Lab Results   Component Value Date/Time     (H) 12/02/2019 03:43 AM    GLUCPOC 151 (H) 12/02/2019 11:15 AM    GLUCPOC 147 (H) 12/02/2019 06:44 AM    GLUCPOC 98 12/01/2019 11:21 PM        Lab Results   Component Value Date/Time    Creatinine 1.20 12/02/2019 03:43 AM     Estimated Creatinine Clearance: 102.2 mL/min (based on SCr of 1.2 mg/dL). Active Orders   Diet    DIET DIABETIC CONSISTENT CARB Regular        PO intake:   Patient Vitals for the past 72 hrs:   % Diet Eaten   12/02/19 1319 100 %   11/30/19 1901 100 %       Will continue to follow as needed. Thank you.     WICHO Wilson, RN, [address]

## 2019-12-02 NOTE — PROGRESS NOTES
222: B, hold insulin sliding scale    2306 - Pt. Reported feeling \"funny\" and requested for BG check. B  Provided pt. With juices.  - B, pt. Asymptomatic.

## 2019-12-02 NOTE — TELEPHONE ENCOUNTER
Spoke with Oliver Cuello, mother. Patient was brought to [facility] via squad on 11/29/19 for chest pain. He had several blocked arteries. Had 5 stents placed on 11/29/19 and 3 stents placed today. He is in ED [facility] room 2162. Patient requests a call from Jose Raman on his cell #, listed in chart.

## 2019-12-02 NOTE — PROGRESS NOTES
Bedside shift change report given to Hiwot Cates RN (oncoming nurse) by Lelia Simon RN (offgoing nurse). Report included the following information SBAR, Kardex, ED Summary, Procedure Summary, Intake/Output, MAR, Accordion, Recent Results, Med Rec Status, Cardiac Rhythm NSR, Alarm Parameters , Pre Procedure Checklist, Procedure Verification, Quality Measures and Dual Neuro Assessment.

## 2019-12-02 NOTE — CARDIO/PULMONARY
Cardiac Rehab Note: chart review      Consult has been acknowledged      Smoking history assessed. Patient is a former smoker.      EF 65%  on 11/29/193echo per      CAD education folder, with heart heathy diet, warning signs, heart facts, catheterization brochure, and out patient cardiac rehab program provided to Jose Ly on 11/30/19                                              Educated using teach back method. Reviewed CAD diagnosis definition and purpose of intervention. Discussed risk factors for CAD to include the following: family history, elevated BMI, hyperlipidemia, hypertension, diabetes, and stress. Pt denies having read over written materials provided during first educational session. Pt asking appropriate questions regarding HH diet. Pt seen by Diabetic nurse for teaching this afternoon as well. Instructed on plant based diet with decrease in saturated fats and heart healthy manner of cooking: broiled, grilled, air fried. Pt and family members verbalized understanding.

## 2019-12-02 NOTE — PROCEDURES
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 PCI using AL0. 75 guide . Crossed occlusion with  200 after Kellie Elaine XT would not cross. Ballooned with 1.5 and 2.0 balloons. Stented with overlapping Navneet OG prox to distal bifurcation , post dilated with 3.0 NC to 15 REGINA. 100 % / PHIL 0 to 0% / PHIL 3.      Angio seal R femoral.

## 2019-12-02 NOTE — PROGRESS NOTES
Bedrest complete. Assisted patient with walking in the grider. No signs or symptoms of chest pain, shortness of breath, or dizziness. Tolerated well. VSS.

## 2019-12-03 VITALS
WEIGHT: 252.65 LBS | HEIGHT: 69 IN | BODY MASS INDEX: 37.42 KG/M2 | OXYGEN SATURATION: 97 % | TEMPERATURE: 98.3 F | RESPIRATION RATE: 18 BRPM | DIASTOLIC BLOOD PRESSURE: 75 MMHG | HEART RATE: 86 BPM | SYSTOLIC BLOOD PRESSURE: 113 MMHG

## 2019-12-03 PROBLEM — I25.110 CORONARY ARTERY DISEASE INVOLVING NATIVE CORONARY ARTERY OF NATIVE HEART WITH UNSTABLE ANGINA PECTORIS (HCC): Status: ACTIVE | Noted: 2019-12-03

## 2019-12-03 LAB
ACT BLD: 224 SECS (ref 79–138)
ACT BLD: 252 SECS (ref 79–138)
ACT BLD: 285 SECS (ref 79–138)
ACT BLD: 406 SECS (ref 79–138)
ACT BLD: 422 SECS (ref 79–138)
ANION GAP SERPL CALC-SCNC: 6 MMOL/L (ref 5–15)
BASOPHILS # BLD: 0.1 K/UL (ref 0–0.1)
BASOPHILS NFR BLD: 1 % (ref 0–1)
BUN SERPL-MCNC: 17 MG/DL (ref 6–20)
BUN/CREAT SERPL: 13 (ref 12–20)
CALCIUM SERPL-MCNC: 8.5 MG/DL (ref 8.5–10.1)
CHLORIDE SERPL-SCNC: 105 MMOL/L (ref 97–108)
CO2 SERPL-SCNC: 27 MMOL/L (ref 21–32)
CREAT SERPL-MCNC: 1.31 MG/DL (ref 0.7–1.3)
DIFFERENTIAL METHOD BLD: ABNORMAL
EOSINOPHIL # BLD: 0.2 K/UL (ref 0–0.4)
EOSINOPHIL NFR BLD: 3 % (ref 0–7)
ERYTHROCYTE [DISTWIDTH] IN BLOOD BY AUTOMATED COUNT: 13.4 % (ref 11.5–14.5)
GLUCOSE BLD STRIP.AUTO-MCNC: 106 MG/DL (ref 65–100)
GLUCOSE BLD STRIP.AUTO-MCNC: 231 MG/DL (ref 65–100)
GLUCOSE BLD STRIP.AUTO-MCNC: 71 MG/DL (ref 65–100)
GLUCOSE BLD STRIP.AUTO-MCNC: 76 MG/DL (ref 65–100)
GLUCOSE SERPL-MCNC: 166 MG/DL (ref 65–100)
HCT VFR BLD AUTO: 39.1 % (ref 36.6–50.3)
HGB BLD-MCNC: 12.9 G/DL (ref 12.1–17)
IMM GRANULOCYTES # BLD AUTO: 0.1 K/UL (ref 0–0.04)
IMM GRANULOCYTES NFR BLD AUTO: 1 % (ref 0–0.5)
LYMPHOCYTES # BLD: 2.5 K/UL (ref 0.8–3.5)
LYMPHOCYTES NFR BLD: 28 % (ref 12–49)
MCH RBC QN AUTO: 28.6 PG (ref 26–34)
MCHC RBC AUTO-ENTMCNC: 33 G/DL (ref 30–36.5)
MCV RBC AUTO: 86.7 FL (ref 80–99)
MONOCYTES # BLD: 0.6 K/UL (ref 0–1)
MONOCYTES NFR BLD: 7 % (ref 5–13)
NEUTS SEG # BLD: 5.2 K/UL (ref 1.8–8)
NEUTS SEG NFR BLD: 60 % (ref 32–75)
NRBC # BLD: 0 K/UL (ref 0–0.01)
NRBC BLD-RTO: 0 PER 100 WBC
PLATELET # BLD AUTO: 202 K/UL (ref 150–400)
PMV BLD AUTO: 10.8 FL (ref 8.9–12.9)
POTASSIUM SERPL-SCNC: 3.8 MMOL/L (ref 3.5–5.1)
RBC # BLD AUTO: 4.51 M/UL (ref 4.1–5.7)
SERVICE CMNT-IMP: ABNORMAL
SERVICE CMNT-IMP: ABNORMAL
SERVICE CMNT-IMP: NORMAL
SERVICE CMNT-IMP: NORMAL
SODIUM SERPL-SCNC: 138 MMOL/L (ref 136–145)
WBC # BLD AUTO: 8.7 K/UL (ref 4.1–11.1)

## 2019-12-03 PROCEDURE — 74011250637 HC RX REV CODE- 250/637: Performed by: INTERNAL MEDICINE

## 2019-12-03 PROCEDURE — 74011636637 HC RX REV CODE- 636/637: Performed by: INTERNAL MEDICINE

## 2019-12-03 PROCEDURE — 36415 COLL VENOUS BLD VENIPUNCTURE: CPT

## 2019-12-03 PROCEDURE — 80048 BASIC METABOLIC PNL TOTAL CA: CPT

## 2019-12-03 PROCEDURE — 74011250636 HC RX REV CODE- 250/636: Performed by: INTERNAL MEDICINE

## 2019-12-03 PROCEDURE — 85025 COMPLETE CBC W/AUTO DIFF WBC: CPT

## 2019-12-03 PROCEDURE — 82962 GLUCOSE BLOOD TEST: CPT

## 2019-12-03 RX ORDER — ICOSAPENT ETHYL 1000 MG/1
1 CAPSULE ORAL 2 TIMES DAILY WITH MEALS
Qty: 30 CAP | Refills: 1 | Status: SHIPPED
Start: 2019-12-03 | End: 2020-01-27

## 2019-12-03 RX ORDER — ROSUVASTATIN CALCIUM 20 MG/1
20 TABLET, COATED ORAL
Qty: 90 TAB | Refills: 3 | Status: SHIPPED | OUTPATIENT
Start: 2019-12-03 | End: 2020-04-29

## 2019-12-03 RX ORDER — METOPROLOL SUCCINATE 25 MG/1
25 TABLET, EXTENDED RELEASE ORAL DAILY
Qty: 90 TAB | Refills: 3 | Status: SHIPPED | OUTPATIENT
Start: 2019-12-03 | End: 2022-04-28

## 2019-12-03 RX ORDER — LISINOPRIL 10 MG/1
TABLET ORAL
Qty: 90 TAB | Refills: 3 | Status: SHIPPED | OUTPATIENT
Start: 2019-12-03 | End: 2020-07-10

## 2019-12-03 RX ORDER — ACETAMINOPHEN 325 MG/1
650 TABLET ORAL ONCE
Status: COMPLETED | OUTPATIENT
Start: 2019-12-03 | End: 2019-12-03

## 2019-12-03 RX ADMIN — FENOFIBRATE 145 MG: 145 TABLET ORAL at 08:21

## 2019-12-03 RX ADMIN — Medication 10 ML: at 07:25

## 2019-12-03 RX ADMIN — HUMAN INSULIN 10 UNITS: 100 INJECTION, SOLUTION SUBCUTANEOUS at 11:48

## 2019-12-03 RX ADMIN — PANTOPRAZOLE SODIUM 40 MG: 40 TABLET, DELAYED RELEASE ORAL at 07:52

## 2019-12-03 RX ADMIN — TICAGRELOR 90 MG: 90 TABLET ORAL at 11:49

## 2019-12-03 RX ADMIN — ASPIRIN 81 MG: 81 TABLET, COATED ORAL at 08:21

## 2019-12-03 RX ADMIN — INSULIN LISPRO 3 UNITS: 100 INJECTION, SOLUTION INTRAVENOUS; SUBCUTANEOUS at 11:49

## 2019-12-03 RX ADMIN — METOPROLOL TARTRATE 12.5 MG: 25 TABLET ORAL at 08:21

## 2019-12-03 RX ADMIN — LORAZEPAM 0.5 MG: 0.5 TABLET ORAL at 02:12

## 2019-12-03 RX ADMIN — BUSPIRONE HYDROCHLORIDE 15 MG: 10 TABLET ORAL at 08:21

## 2019-12-03 RX ADMIN — SERTRALINE HYDROCHLORIDE 100 MG: 50 TABLET ORAL at 08:21

## 2019-12-03 RX ADMIN — HUMAN INSULIN 10 UNITS: 100 INJECTION, SOLUTION SUBCUTANEOUS at 09:03

## 2019-12-03 RX ADMIN — FENTANYL CITRATE 25 MCG: 50 INJECTION, SOLUTION INTRAMUSCULAR; INTRAVENOUS at 02:12

## 2019-12-03 RX ADMIN — ACETAMINOPHEN 650 MG: 325 TABLET ORAL at 09:10

## 2019-12-03 RX ADMIN — LEVOTHYROXINE SODIUM 100 MCG: 75 TABLET ORAL at 07:25

## 2019-12-03 RX ADMIN — HUMAN INSULIN 80 UNITS: 100 INJECTION, SUSPENSION SUBCUTANEOUS at 09:03

## 2019-12-03 NOTE — PROGRESS NOTES
[facility]  Home  Follow up appointments      1155am  Brillinta card provided to patient  Follow up appointments noted on AVS.    CM tasks are complete for discharge. Nursing informed.      Jen Fairchild RN CM  Ext [phone]

## 2019-12-03 NOTE — DISCHARGE SUMMARY
Hospitalist Discharge Note    NAME: David Leblanc   :  1980   MRN:  [id number]     Admit date: 2019    Discharge date: 19    PCP: Mendoza Giang MD    Discharge Diagnoses:    Unstable angina POA    CAD POA s/p LAD, LCirc and RCA stents    Essential Hypertension POA     SERGE resolved    DM type 2 with hyperglycemia POA    Hyperlipidemia    Depression     Hypothyroidism     Migraines      Obesity POA Body mass index is 37.31 kg/m². with complications DM2 HTN     Code Status: Full     Discharge Medications:  Current Discharge Medication List      START taking these medications    Details   ticagrelor (BRILINTA) 90 mg tablet Take 1 Tab by mouth every twelve (12) hours every twelve (12) hours. Qty: 60 Tab, Refills: 12      metoprolol succinate (TOPROL-XL) 25 mg XL tablet Take 1 Tab by mouth daily. Qty: 90 Tab, Refills: 3         CONTINUE these medications which have CHANGED    Details   lisinopril (PRINIVIL, ZESTRIL) 10 mg tablet TAKE 1 TABLET BY MOUTH ONCE DAILY  Qty: 90 Tab, Refills: 3    Associated Diagnoses: Hypertension goal BP (blood pressure) < 130/80      rosuvastatin (CRESTOR) 20 mg tablet Take 1 Tab by mouth nightly. Qty: 90 Tab, Refills: 3         CONTINUE these medications which have NOT CHANGED    Details   LORazepam (ATIVAN) 0.5 mg tablet TAKE 1 TABLET BY MOUTH ONCE DAILY AS NEEDED  Qty: 30 Tab, Refills: 0    Associated Diagnoses: Depression with anxiety      cholecalciferol, VITAMIN D3, (VITAMIN D3) 5,000 unit tab tablet Take 1 Tab by mouth daily. Qty: 90 Tab, Refills: 1    Associated Diagnoses: Vitamin D deficiency      clomiPHENE (CLOMID) 50 mg tablet One pill every other day  Qty: 15 Tab, Refills: 3      lidocaine, PF, (XYLOCAINE) 10 mg/mL (1 %) injection Given in office  Qty: 1 Vial, Refills: 0    Associated Diagnoses: Cervicogenic headache      chlorthalidone (HYGROTEN) 25 mg tablet Take 1 Tab by mouth daily.   Qty: 90 Tab, Refills: 1    Associated Diagnoses: Hypertension goal BP (blood pressure) < 130/80      galcanezumab-gnlm (EMGALITY PEN) 120 mg/mL injection 1 mL by SubCUTAneous route every thirty (30) days. Qty: 1 mL, Refills: 11    Associated Diagnoses: Intractable chronic migraine without aura and without status migrainosus      cyclobenzaprine (FLEXERIL) 10 mg tablet Take 10 mg by mouth three (3) times daily as needed for Muscle Spasm(s). aspirin delayed-release 81 mg tablet Take 1 Tab by mouth daily. Qty: 30 Tab, Refills: 0      butalbital-acetaminophen-caffeine (FIORICET, ESGIC) -40 mg per tablet TAKE 1 TABLET BY MOUTH AT THE ONSET OF HEADACHE. CAN TAKE TWICE DAILY BUT NOT MORE THAN 10 DAYS/MONTH.ONLY TO BE FILLED IN 1 MONTH INTERVALS  Qty: 20 Tab, Refills: 2    Comments: Please consider 90 day supplies to promote better adherence  Associated Diagnoses: Migraine without aura and without status migrainosus, not intractable      busPIRone (BUSPAR) 15 mg tablet Take 1 Tab by mouth three (3) times daily. Qty: 90 Tab, Refills: 3    Associated Diagnoses: Anxiety      sertraline (ZOLOFT) 100 mg tablet Take 1 Tab by mouth daily. Comments: Please consider 90 day supplies to promote better adherence  Associated Diagnoses: Anxiety      insulin NPH (NOVOLIN N NPH U-100 INSULIN) 100 unit/mL injection by SubCUTAneous route once.  80 units twice a day      levothyroxine (SYNTHROID) 112 mcg tablet TAKE 1 TABLET BY MOUTH ONCE DAILY BEFORE BREAKFAST  Qty: 90 Tab, Refills: 3    Associated Diagnoses: Acquired hypothyroidism      fenofibrate (LOFIBRA) 160 mg tablet TAKE 1 TABLET BY MOUTH ONCE DAILY  Qty: 90 Tab, Refills: 3    Comments: Please consider 90 day supplies to promote better adherence      insulin regular (NOVOLIN R, HUMULIN R) 100 unit/mL injection 15-20 units TID AC  Qty: 1 Vial, Refills: 12    Associated Diagnoses: Uncontrolled type 1 diabetes mellitus without complication (HCC)         STOP taking these medications       ondansetron (ZOFRAN ODT) 4 mg disintegrating tablet Comments:   Reason for Stopping:         metFORMIN (GLUCOPHAGE) 500 mg tablet Comments:   Reason for Stopping: Follow-up Information     Follow up With Specialties Details Why Contact Info    Laney Randle MD Cardiology Schedule an appointment as soon as possible for a visit in 2 weeks Cardiology - office will call you to schedule.    If you do not hear from them in 2 business days please follow up . [phone]      Victor Hugo Jones MD Internal Medicine On 12/4/2019 For previously scheduled appointment at 11:10AM  [address]  [phone]            Time spent on discharge:   I spent greater than 30 minutes on discharge, seeing and examining the patient, reconciling home meds and new meds, coordinating care with case management, doing the discharge papers and the D/C summary    Discharge disposition: home    Discharge Condition: Stable    Summary of admission H+P(copied from Dr Katy Mazariegos Note):     CHIEF COMPLAINT: chest pain     HISTORY OF PRESENT ILLNESS:     35years old male from home with past medical history significant for hypertension, hyperlipidemia, DM presented to the hospital for evaluation of chest pain radiated to the left started today, patient stated he been complaining from chest pain for the month and he was seen by cardiologist and patient had stress test done for him as outpatient and it was abnormal and patient was scheduled for cardiac cath next week, patient denies any nausea any vomiting any shortness of breath.               We were asked to admit for work up and evaluation of the above problems.           Past Medical History:   Diagnosis Date    Anxiety      Chronic headaches 2007    Depression      Diabetes ([address]      GERD (gastroesophageal reflux disease)      Hypercholesterolemia      Hypertension      Thyroid disease       hypothyroid    Unspecified sleep apnea       does not use CPAP      pCXR read by radiology FINDINGS:   Cardiac monitoring wires overlie the thorax. The cardiomediastinal  contours are stable. The lungs and pleural spaces are clear. There is no pneumothorax. The bones and upper abdomen are stable. IMPRESSION: No acute process. Echo TTE read by cardiology   Left Ventricle Normal cavity size, systolic function (ejection fraction normal) and diastolic function. Moderate concentric hypertrophy. The estimated ejection fraction is 61 - 65%. Left Atrium Normal cavity size. Right Ventricle Normal cavity size. Right Atrium Normal cavity size. Aortic Valve Trileaflet valve structure, no stenosis and no regurgitation. There is a possible mass on the aortic valve. Mitral Valve Normal valve structure and no stenosis. Trace regurgitation. Tricuspid Valve Normal valve structure and no stenosis. Trace tricuspid valve regurgitation. Pulmonary arterial systolic pressure is 44.3 mmHg. Pulmonic Valve Normal valve structure. Aorta Normal aortic root, ascending aortic, and aortic arch. Cardiac cath 11/29/2019 per cardiology note:  Diagnostic   Dominance: Right   Left Anterior Descending   Prox LAD to Mid LAD lesion 90% stenosed. .   Left Circumflex   Mid Cx lesion 90% stenosed. .   Intervention     Prox LAD to Mid LAD lesion   Angioplasty   Angioplasty was performed prior to stent deployment. Balloon inflated using multiple inflations inflation technique. Supplies used: CATH BLLN RX TREK 2.78F01FQ --    Stent   Drug-eluting stent was successfully placed. Supplies used: STENT COR OG 2.19S43AG -- OG RESOLUTE JAMIL   Stent   Drug-eluting stent was successfully placed. Supplies used: STENT COR OG 2.58I31CW -- OG RESOLUTE JAMIL   Stent   Drug-eluting stent was successfully placed. Supplies used: STENT COR OG 3.51E35NI -- OG RESOLUTE JAMIL   Stent   Drug-eluting stent was successfully placed.    Supplies used: STENT COR OG 3.33V25CV -- OG RESOLUTE JAMIL   Angioplasty   Angioplasty was performed following stent deployment. Supplies used: CATH BLLN COR DIL 3X20MM -- NC TREK RAPID-EXCHANGE   Post-Intervention Lesion Assessment          Mid Cx lesion   Stent   Drug-eluting stent was successfully placed. Supplies used: STENT COR OG 2.21S49SL -- OG RESOLUTE JAMIL   Post-Intervention Lesion Assessment     Cardiac cath per Dr Maida Whelan note 12/2/2019   Diagnostic   Dominance: Right   Right Coronary Artery   Prox RCA lesion 100% stenosed. . Diagnostic coronary angiography shows positive for . Intervention     Prox RCA lesion   Angioplasty   Angioplasty using a standard balloon was performed prior to stent deployment. Balloon inflated using multiple inflations inflation technique. Supplies used: CATH PRUDENCIO BLLN OTW 1.5X12MM -- SPRINTER; CATH BLLN RX MINI TREK 2X30MM --    Stent   Multiple inflations done and stents placed. Drug-eluting stent was successfully placed. Supplies used: STENT COR OG 2.71H97GV -- OG RESOLUTE JAMIL; STENT COR OG 2.58U43JR -- OG RESOLUTE JAMIL; STENT COR OG 2.17I83NN -- OG RESOLUTE JAMIL   Post-Intervention Lesion Assessment   The intervention was successful. The guidewire crossed the lesion. Device was deployed. There is no pre-intervention PHIL flow. Post-intervention PHIL flow is 3. There were no complications. Supplies used: STENT COR OG 2.28J27MG -- OG RESOLUTE JAMIL; STENT COR OG 2.79L89TD -- OG RESOLUTE JAMIL; STENT COR OG 2.96E67UG -- OG RESOLUTE JAMIL; CATH BLLN COR DIL 3X15MM -- NC TREK RAPID-EXCHANGE; CATH BLLN RX MINI TREK 2X30MM -- ; CATH PRUDENCIO BLLN OTW 1.5X12MM -- SPRINTER   There is a 0% residual stenosis post intervention.        Hospital course:     Unstable angina   CAD POA s/p LAD, LCirc and RCA stents  PCI LAD and LCX with stents on 11/29  Unsuccessful PCI of the RCA on 11/29  S/p PCI of RCA on 12/02  Pt came in with CP and abnormal Outpatient stress test   Underwent cardiac cath with stent placement on LAD and LCX on 11/29. RCA completely occluded , unable to access    Pt returned  to cath lab on 12/2 and underwent staged PCI of the occluded RCA   C/w asa , brilinta , statin , BB , resumed ACE in at discharge  2D echo showed EF 61%   Cleared for discharge by Dr Burt Escobar Hypertension POA  C/w BB ,restart ACEI upon DC     SERGE resolved   Creat 1.33> 1.47> 1.27    DM2 with hyperglycemia   BS more controlled   Resume home insulin at discharge  C/w SSI , diabetic diet   Hba1c 10%     Hyperlipidemia  Depression   Hypothyroidism   Migraines   Continue with statin , c/w zoloft   C/w synthroid    /w prn Fioricet      Obesity : BMI > 35 with complications DM2 HTN     Code Status: Full     Surrogate Decision Maker:Arnaud Cervantes     DVT Prophylaxis: Lovenox   GI Prophylaxis: not indicated     Subjective:     Chief Complaint / Reason for Physician Visit  Fu unstable angina. \"My right groin is mildly sore\"  Minimal CP, no SOB or N/V  Sore mildly in right groin after the cardiac cath  Cleared by cardiology for discharge  Discussed with RN events overnight. Review of Systems:  Symptom Y/N Comments  Symptom Y/N Comments   Fever/Chills n   Chest Pain n    Poor Appetite    Edema     Cough    Abdominal Pain n    Sputum    Joint Pain     SOB/COLE n   Pruritis/Rash     Nausea/vomit n   Tolerating PT/OT     Diarrhea n   Tolerating Diet y    Constipation    Other       Could NOT obtain due to:      Objective:     VITALS:   Last 24hrs VS reviewed since prior progress note.  Most recent are:  Patient Vitals for the past 24 hrs:   Temp Pulse Resp BP SpO2   12/03/19 1140 98.3 °F (36.8 °C) 86 18 113/75 97 %   12/03/19 0745 97.9 °F (36.6 °C) 89 18 117/57 100 %   12/03/19 0300 98.2 °F (36.8 °C) 85 18 133/64 99 %   12/02/19 2300 96.9 °F (36.1 °C) 86 18 150/82 97 %   12/02/19 1900 97.8 °F (36.6 °C) 89 18 149/61 98 %   12/02/19 1500 97.9 °F (36.6 °C) 82 18 120/68 98 %   12/02/19 1430 -- 73 -- 124/70 98 % 12/02/19 1400 -- 74 -- 127/77 99 %   12/02/19 1330 -- 71 -- 118/69 100 %   12/02/19 1300 -- 72 -- 129/66 100 %   12/02/19 1230 -- 72 -- 130/55 99 %   12/02/19 1200 -- 71 -- 139/53 99 %       Intake/Output Summary (Last 24 hours) at 12/3/2019 1156  Last data filed at 12/2/2019 1735  Gross per 24 hour   Intake 600 ml   Output 750 ml   Net -150 ml        PHYSICAL EXAM:  General: WD, WN. Alert, cooperative, no acute distress    EENT:  EOMI. Anicteric sclerae. MMM  Resp:  CTA bilaterally, no wheezing or rales. No accessory muscle use  CV:  Regular  rhythm,  No edema  GI:  Soft, Non distended, Non tender.  +Bowel sounds  Neurologic:  Alert and oriented X 3, normal speech,   Psych:   Good insight. Not anxious nor agitated  Skin:  No rashes. No jaundice    Reviewed most current lab test results and cultures  YES  Reviewed most current radiology test results   YES  Review and summation of old records today    NO  Reviewed patient's current orders and MAR    YES  PMH/ reviewed - no change compared to H&P  ________________________________________________________________________  Care Plan discussed with:    Comments   Patient x    Family      RN x    Care Manager     Consultant                        Multidiciplinary team rounds were held today with , nursing, pharmacist and clinical coordinator. Patient's plan of care was discussed; medications were reviewed and discharge planning was addressed. ________________________________________________________________________      Comments   >50% of visit spent in counseling and coordination of care x    ________________________________________________________________________  Ekta Lopez MD     Procedures: see electronic medical records for all procedures/Xrays and details which were not copied into this note but were reviewed prior to creation of Plan. LABS:  I reviewed today's most current labs and imaging studies.   Pertinent labs include:  Recent Labs     12/03/19 0214 12/02/19 0343 12/01/19  0530   WBC 8.7 10.2 9.6   HGB 12.9 13.8 13.8   HCT 39.1 40.8 40.9    197 202     Recent Labs     12/03/19 0214 12/02/19 0343 12/01/19  0530    138 142   K 3.8 3.7 3.4*    105 108   CO2 27 26 26   * 191* 67   BUN 17 18 17   CREA 1.31* 1.20 1.15   CA 8.5 8.6 8.8       Signed: Rina Dunn MD

## 2019-12-03 NOTE — PROGRESS NOTES
07:05 - Bedside and Verbal shift change report given to KG Campbell (oncoming nurse) by Lakhwinder Elise RN (offgoing nurse). Report included the following information SBAR, Kardex, Procedure Summary, MAR, Recent Results and Cardiac Rhythm SR.     07:25 - BS 76.  OJ given per protocol. Advised Rubi SANTAMARIA RN who is assuming care. 09:15 - Assumed care of patient. 12:45 - Spoke with Dr Bibiana Herrera regarding discharge order for fenofibrate 160mg daily. Patient states MD took him off this med and exchanged for Vascepa 1Gm BID. Confirmed this order with Dr Bibiana Herrera. Adjustment made by Buster Barrios NP.    13:00 - Discharge instructions given to patient. Verbalizes understanding. PIV and tele removed.

## 2019-12-03 NOTE — PROGRESS NOTES
Progress Note      12/3/2019 9:52 AM  NAME: Norma Rocha   MRN:  [id number]   Admit Diagnosis: Chest pain [R07.9]; Unstable angina (HCC) [I20.0]     Assessment:     Severe Multivessel CAD on CATH   PCI of prox - mid LAD with JAMIL OG   PCI of Cx Marginal with ONX OG   RCA is total occlusion. LV EF  - 60%   Normal LV fx.     Echo normal LV function with EF 60%. No significant valve disease. 12/2 PCI of RCA for  .  3 JAMIL OG . 0% residual.   Normal flow. Unstable Angina   HTN  Hyperlipidemia  Diabetes type 1  - onset age 8. Obesity   Family hx of CAD / MI. DJD   ? Cervical disc disease.         Has been on short term disability due to dizziness and lightheadedness.       His Father was just in hospital in October and had PCI . 12/1  No further chest pain at this time. 12/3 Doing well . Some pain at cath site, otherwise fine. Site looks fine.          Plan:     OK to discharge home. Advance as tolerated. F/u with Dr Handy Hernandez in ~ 2 weeks. [x]        High complexity decision making was performed    Subjective:     Norma Rocha denies chest pain, dyspnea. Discussed with RN events overnight.      Patient Active Problem List   Diagnosis Code    DM (diabetes mellitus) ([facility] [address] E11.9    Acquired hypothyroidism E03.9    Essential hypertension I10    Fibromyalgia M79.7    Microalbuminuria R80.9    Anxiety F41.9    Migraine without aura G43.009    Hypertriglyceridemia E78.1    Severe obesity (HCC) E66.01    Bilateral carotid artery stenosis I65.23    Transient ischemic attack involving left internal carotid artery G45.1    Chest pain R07.9    Unstable angina (HCC) I20.0       Review of Systems:    Symptom Y/N Comments  Symptom Y/N Comments   Fever/Chills N   Chest Pain N    Poor Appetite N   Edema N    Cough N   Abdominal Pain N    Sputum N   Joint Pain N    SOB/COLE N   Pruritis/Rash N    Nausea/vomit N   Tolerating PT/OT Y    Diarrhea N Tolerating Diet Y    Constipation N   Other       Could NOT obtain due to:      Objective:      Physical Exam:    Last 24hrs VS reviewed since prior progress note. Most recent are:    Visit Vitals  /57 (BP 1 Location: Right arm, BP Patient Position: At rest)   Pulse 89   Temp 97.9 °F (36.6 °C)   Resp 18   Ht 5' 9\" (1.753 m)   Wt 114.6 kg (252 lb 10.4 oz)   SpO2 100%   BMI 37.31 kg/m²       Intake/Output Summary (Last 24 hours) at 12/3/2019 0943  Last data filed at 12/2/2019 1735  Gross per 24 hour   Intake 600 ml   Output 750 ml   Net -150 ml        General Appearance: Well developed, well nourished, alert & oriented x 3,    no acute distress. Ears/Nose/Mouth/Throat: Hearing grossly normal.  Neck: Supple. Chest: Lungs clear to auscultation bilaterally. Cardiovascular: Regular rate and rhythm, S1S2 normal, no murmur. Abdomen: Soft, non-tender, bowel sounds are active. Extremities: No edema bilaterally. Skin: Warm and dry. PMH/SH reviewed - no change compared to H&P    Data Review    Telemetry: normal sinus rhythm     Lab Data Personally Reviewed:    Recent Labs     12/03/19  0214 12/02/19  0343   WBC 8.7 10.2   HGB 12.9 13.8   HCT 39.1 40.8    197   LABRCNT(INR:3,PTP:3,APTT:3,)  Recent Labs     12/03/19  0214 12/02/19  0343 12/01/19  0530    138 142   K 3.8 3.7 3.4*    105 108   CO2 27 26 26   BUN 17 18 17   CREA 1.31* 1.20 1.15   * 191* 67   CA 8.5 8.6 8.8   LABRCNT(CPK:3,CpKMB:3,ckndx:3,troiq:3)  Lab Results   Component Value Date/Time    Cholesterol, total 124 11/30/2019 03:53 AM    HDL Cholesterol 31 11/30/2019 03:53 AM    LDL,Direct 151 (H) 08/24/2014 02:16 AM    LDL, calculated 47.2 11/30/2019 03:53 AM    Triglyceride 229 (H) 11/30/2019 03:53 AM    CHOL/HDL Ratio 4.0 11/30/2019 03:53 AM   LABRCNT(sgot:3,gpt:3,ap:3,tbiL:3,TP:3,ALB:3,GLOB:3,ggt:3,aml:3,amyp:3,lpse:3,hlpse:3)No results for input(s): PH, PCO2, PO2 in the last 72 hours.   Lab Results   Component Value Date/Time    Cholesterol, total 124 11/30/2019 03:53 AM    HDL Cholesterol 31 11/30/2019 03:53 AM    LDL,Direct 151 (H) 08/24/2014 02:16 AM    LDL, calculated 47.2 11/30/2019 03:53 AM    Triglyceride 229 (H) 11/30/2019 03:53 AM    CHOL/HDL Ratio 4.0 11/30/2019 03:53 AM   Jose Miguel Oakley MD  No results for input(s): PH, PCO2, PO2 in the last 72 hours.     Medications Personally Reviewed:    Current Facility-Administered Medications   Medication Dose Route Frequency    sodium chloride (NS) flush 5-40 mL  5-40 mL IntraVENous Q8H    sodium chloride (NS) flush 5-40 mL  5-40 mL IntraVENous PRN    zolpidem (AMBIEN) tablet 5 mg  5 mg Oral QHS PRN    0.9% sodium chloride infusion  100 mL/hr IntraVENous CONTINUOUS    insulin NPH (NOVOLIN N, HUMULIN N) injection 80 Units  80 Units SubCUTAneous ACB&D    insulin regular (NOVOLIN R, HUMULIN R) injection 10 Units  10 Units SubCUTAneous TIDAC    butalbital-acetaminophen-caffeine (FIORICET, ESGIC) -40 mg per tablet 1 Tab  1 Tab Oral Q6H PRN    LORazepam (ATIVAN) tablet 0.5 mg  0.5 mg Oral DAILY PRN    ticagrelor (BRILINTA) tablet 90 mg  90 mg Oral Q12H    sodium chloride (NS) flush 5-40 mL  5-40 mL IntraVENous Q8H    sodium chloride (NS) flush 5-40 mL  5-40 mL IntraVENous PRN    zolpidem (AMBIEN) tablet 5 mg  5 mg Oral QHS PRN    pantoprazole (PROTONIX) tablet 40 mg  40 mg Oral ACB    calcium carbonate (TUMS) chewable tablet 200 mg [elemental]  200 mg Oral TID PRN    metoprolol tartrate (LOPRESSOR) tablet 12.5 mg  12.5 mg Oral Q12H    sodium chloride (NS) flush 5-40 mL  5-40 mL IntraVENous Q8H    sodium chloride (NS) flush 5-40 mL  5-40 mL IntraVENous PRN    ondansetron (ZOFRAN) injection 4 mg  4 mg IntraVENous Q6H PRN    bisacodyl (DULCOLAX) tablet 5 mg  5 mg Oral DAILY PRN    enoxaparin (LOVENOX) injection 40 mg  40 mg SubCUTAneous Q24H    glucose chewable tablet 16 g  4 Tab Oral PRN    dextrose (D50W) injection syrg 12.5-25 g  25-50 mL IntraVENous PRN    glucagon (GLUCAGEN) injection 1 mg  1 mg IntraMUSCular PRN    insulin lispro (HUMALOG) injection   SubCUTAneous AC&HS    rosuvastatin (CRESTOR) tablet 20 mg  20 mg Oral QHS    fenofibrate nanocrystallized (TRICOR) tablet 145 mg  145 mg Oral DAILY    levothyroxine (SYNTHROID) tablet 100 mcg  100 mcg Oral 6am    sertraline (ZOLOFT) tablet 100 mg  100 mg Oral DAILY    cyclobenzaprine (FLEXERIL) tablet 10 mg  10 mg Oral TID PRN    aspirin delayed-release tablet 81 mg  81 mg Oral DAILY    nitroglycerin (NITROSTAT) tablet 0.4 mg  0.4 mg SubLINGual Q5MIN PRN    fentaNYL citrate (PF) injection 25 mcg  25 mcg IntraVENous Q4H PRN    busPIRone (BUSPAR) tablet 15 mg  15 mg Oral BID         Astrid Quintanilla MD

## 2019-12-03 NOTE — DISCHARGE INSTRUCTIONS
Patient Discharge Instructions    Arian Armstrong / [id number] : 1980    Admitted 2019 Discharged: 12/3/2019         DISCHARGE DIAGNOSIS:     Unstable angina ([facility] (2019)      DM (diabetes mellitus) ([facility] (2014) type 1      Acquired hypothyroidism (2016)      Essential hypertension (2016)      Bilateral carotid artery stenosis (7/15/2019)      Coronary artery disease involving native coronary artery of native heart with unstable angina pectoris ([facility] (12/3/2019)           What to do at Home    1. Recommended diet: Diabetic Diet, low fat    2. Recommended activity: Activity as tolerated    3. If you experience any of the following symptoms then please call your primary care physician or return to the emergency room if you cannot get hold of your doctor:   Fevers > 100.5, chills   Nausea or vomiting, persistent diarrhea > 24 hours   Blood in stool or black stools   Chest pain or SOB      Follow-up Information     Follow up With Specialties Details Why Contact Info    Inessa Delcid MD Cardiology Schedule an appointment as soon as possible for a visit in 2 weeks Cardiology - office will call you to schedule. If you do not hear from them in 2 business days please follow up . [address] [phone]      Yue Escobar MD Internal Medicine On 2019 For previously scheduled appointment at 11:10AM  [address] [place] [address]  [phone]          Do not stop taking the aspirin and brilinta until,Dr Roseanna Esquivel says it is okay to stop    Hold metformin till Dr Jose De Jesus Holden rechecks your kidney function at your next visit    Information obtained by :  I understand that if any problems occur once I am at home I am to contact my physician. I understand and acknowledge receipt of the instructions indicated above. Physician's or R.N.'s Signature                                                                  Date/Time                                                                                                                                              Patient or Representative Signature                                                          Date/Time

## 2019-12-04 ENCOUNTER — APPOINTMENT (OUTPATIENT)
Dept: GENERAL RADIOLOGY | Age: 39
End: 2019-12-04
Attending: EMERGENCY MEDICINE
Payer: COMMERCIAL

## 2019-12-04 ENCOUNTER — HOSPITAL ENCOUNTER (EMERGENCY)
Age: 39
Discharge: HOME | End: 2019-12-04
Attending: EMERGENCY MEDICINE
Payer: COMMERCIAL

## 2019-12-04 VITALS
TEMPERATURE: 97.9 F | OXYGEN SATURATION: 98 % | BODY MASS INDEX: 37.47 KG/M2 | HEART RATE: 85 BPM | RESPIRATION RATE: 18 BRPM | DIASTOLIC BLOOD PRESSURE: 51 MMHG | SYSTOLIC BLOOD PRESSURE: 117 MMHG | HEIGHT: 69 IN | WEIGHT: 253 LBS

## 2019-12-04 DIAGNOSIS — R07.89 ATYPICAL CHEST PAIN: Primary | ICD-10-CM

## 2019-12-04 LAB
ANION GAP SERPL CALC-SCNC: 8 MMOL/L (ref 5–15)
ATRIAL RATE: 84 BPM
BASOPHILS # BLD: 0.1 K/UL (ref 0–0.1)
BASOPHILS NFR BLD: 1 % (ref 0–1)
BUN SERPL-MCNC: 20 MG/DL (ref 6–20)
BUN/CREAT SERPL: 14 (ref 12–20)
CALCIUM SERPL-MCNC: 9.1 MG/DL (ref 8.5–10.1)
CALCULATED P AXIS, ECG09: 39 DEGREES
CALCULATED R AXIS, ECG10: 33 DEGREES
CALCULATED T AXIS, ECG11: 52 DEGREES
CHLORIDE SERPL-SCNC: 100 MMOL/L (ref 97–108)
CO2 SERPL-SCNC: 27 MMOL/L (ref 21–32)
CREAT SERPL-MCNC: 1.44 MG/DL (ref 0.7–1.3)
DIAGNOSIS, 93000: NORMAL
DIFFERENTIAL METHOD BLD: ABNORMAL
EOSINOPHIL # BLD: 0.3 K/UL (ref 0–0.4)
EOSINOPHIL NFR BLD: 3 % (ref 0–7)
ERYTHROCYTE [DISTWIDTH] IN BLOOD BY AUTOMATED COUNT: 13.2 % (ref 11.5–14.5)
GLUCOSE SERPL-MCNC: 305 MG/DL (ref 65–100)
HCT VFR BLD AUTO: 41.5 % (ref 36.6–50.3)
HGB BLD-MCNC: 13.6 G/DL (ref 12.1–17)
IMM GRANULOCYTES # BLD AUTO: 0.1 K/UL (ref 0–0.04)
IMM GRANULOCYTES NFR BLD AUTO: 2 % (ref 0–0.5)
LYMPHOCYTES # BLD: 2.1 K/UL (ref 0.8–3.5)
LYMPHOCYTES NFR BLD: 26 % (ref 12–49)
MCH RBC QN AUTO: 28.2 PG (ref 26–34)
MCHC RBC AUTO-ENTMCNC: 32.8 G/DL (ref 30–36.5)
MCV RBC AUTO: 86.1 FL (ref 80–99)
MONOCYTES # BLD: 0.5 K/UL (ref 0–1)
MONOCYTES NFR BLD: 6 % (ref 5–13)
NEUTS SEG # BLD: 5.1 K/UL (ref 1.8–8)
NEUTS SEG NFR BLD: 62 % (ref 32–75)
NRBC # BLD: 0 K/UL (ref 0–0.01)
NRBC BLD-RTO: 0 PER 100 WBC
P-R INTERVAL, ECG05: 152 MS
PLATELET # BLD AUTO: 219 K/UL (ref 150–400)
PMV BLD AUTO: 10.5 FL (ref 8.9–12.9)
POTASSIUM SERPL-SCNC: 4.2 MMOL/L (ref 3.5–5.1)
Q-T INTERVAL, ECG07: 340 MS
QRS DURATION, ECG06: 88 MS
QTC CALCULATION (BEZET), ECG08: 401 MS
RBC # BLD AUTO: 4.82 M/UL (ref 4.1–5.7)
SODIUM SERPL-SCNC: 135 MMOL/L (ref 136–145)
TROPONIN I SERPL-MCNC: <0.05 NG/ML
VENTRICULAR RATE, ECG03: 84 BPM
WBC # BLD AUTO: 8.2 K/UL (ref 4.1–11.1)

## 2019-12-04 PROCEDURE — 85025 COMPLETE CBC W/AUTO DIFF WBC: CPT

## 2019-12-04 PROCEDURE — 84484 ASSAY OF TROPONIN QUANT: CPT

## 2019-12-04 PROCEDURE — 36415 COLL VENOUS BLD VENIPUNCTURE: CPT

## 2019-12-04 PROCEDURE — 99283 EMERGENCY DEPT VISIT LOW MDM: CPT

## 2019-12-04 PROCEDURE — 74011636637 HC RX REV CODE- 636/637: Performed by: EMERGENCY MEDICINE

## 2019-12-04 PROCEDURE — 93005 ELECTROCARDIOGRAM TRACING: CPT

## 2019-12-04 PROCEDURE — 71046 X-RAY EXAM CHEST 2 VIEWS: CPT

## 2019-12-04 PROCEDURE — 74011250637 HC RX REV CODE- 250/637: Performed by: EMERGENCY MEDICINE

## 2019-12-04 PROCEDURE — 96374 THER/PROPH/DIAG INJ IV PUSH: CPT

## 2019-12-04 PROCEDURE — 80048 BASIC METABOLIC PNL TOTAL CA: CPT

## 2019-12-04 RX ORDER — NITROGLYCERIN 0.4 MG/1
0.4 TABLET SUBLINGUAL
Qty: 1 BOTTLE | Refills: 0 | Status: SHIPPED | OUTPATIENT
Start: 2019-12-04

## 2019-12-04 RX ORDER — NITROGLYCERIN 0.4 MG/1
0.4 TABLET SUBLINGUAL
Status: DISCONTINUED | OUTPATIENT
Start: 2019-12-04 | End: 2019-12-04 | Stop reason: HOSPADM

## 2019-12-04 RX ORDER — METOPROLOL TARTRATE 5 MG/5ML
5 INJECTION INTRAVENOUS ONCE
Status: DISCONTINUED | OUTPATIENT
Start: 2019-12-04 | End: 2019-12-04 | Stop reason: HOSPADM

## 2019-12-04 RX ADMIN — NITROGLYCERIN 0.4 MG: 0.4 TABLET, ORALLY DISINTEGRATING SUBLINGUAL at 13:26

## 2019-12-04 RX ADMIN — HUMAN INSULIN 10 UNITS: 100 INJECTION, SOLUTION SUBCUTANEOUS at 13:10

## 2019-12-04 NOTE — DISCHARGE INSTRUCTIONS
Try taking Nitroglycerin and tylenol as needed for chest pain. Follow up closely with Dr. Arelis Kearney or Dr. Elizabeth Llamas.

## 2019-12-04 NOTE — ED NOTES
Patient presents to the ED ambulatory complaining of left side chest pain. Patient was discharged on 12/3/2019 after having 8 stents placed. Patient reports that he had chest pain at the time that he was discharged, continue to have chest pain overnight last night. Chest pain is in his left chest, aching and sharp,  8 out of 10 severity. Patient acknowledges some palpitations and denies nausea and vomiting.

## 2019-12-04 NOTE — ED PROVIDER NOTES
EMERGENCY DEPARTMENT HISTORY AND PHYSICAL EXAM      Date: 12/4/2019  Patient Name: Maite Cormier  Patient Age and Sex: 35 y.o. male    History of Presenting Illness     Chief Complaint   Patient presents with    Chest Pain     pt reports he was admitted last week, had cardiac stents placed, continues with chest pain and elevated BP       History Provided By: Patient and Patient's Mother    Ability to gather history was limited by: None    HPI: Maite Cormier, 35 y.o. male with history of type 1 diabetes, hypercholesterolemia, hypertension, coronary artery disease, just discharged from the hospital yesterday after he was hospitalized for unstable angina and had something like 8 drug-eluting stents placed to multiple coronary vessels. He states that he had chest pain at the time that he was discharged, continue to have chest pain overnight last night. Chest pain is focal right over the area of the heart in the left chest, aching and sharp in nature, up to 8 out of 10 severity. Some palpitations. No significant shortness of breath or nausea or vomiting. He reports his blood pressure was 160/100 range all night last night. Pt denies any other alleviating or exacerbating factors. There are no other complaints, changes or physical findings at this time.      Past Medical History:   Diagnosis Date    Anxiety     Chronic headaches 2007    Depression     Diabetes ([address]     GERD (gastroesophageal reflux disease)     Hypercholesterolemia     Hypertension     Thyroid disease     hypothyroid    Unspecified sleep apnea     does not use CPAP     Past Surgical History:   Procedure Laterality Date    HX CHOLECYSTECTOMY  8/26/14    Dr Beckie Gould    HX HEENT      wisdom teeth removed    HX ORTHOPAEDIC Left 2012    carpel tunnel       PCP: Amelia Khan MD    Past History     Past Medical History:  Past Medical History:   Diagnosis Date    Anxiety     Chronic headaches 2007    Depression     [address] [address]     GERD (gastroesophageal reflux disease)     Hypercholesterolemia     Hypertension     Thyroid disease     hypothyroid    Unspecified sleep apnea     does not use CPAP       Past Surgical History:  Past Surgical History:   Procedure Laterality Date    HX CHOLECYSTECTOMY  14    Dr Salma Downing    HX HEENT      wisdom teeth removed    HX ORTHOPAEDIC Left 2012    carpel tunnel       Family History:  Family History   Problem Relation Age of Onset    Diabetes Mother     Heart Disease Father     Cancer Father     Diabetes Father     Diabetes Paternal Aunt     Diabetes Maternal Grandmother     Thyroid Cancer Maternal Grandmother     Kidney Disease Maternal Grandmother     Arthritis Maternal Grandmother     Heart Disease Maternal Grandfather     High Cholesterol Maternal Grandfather     Hypertension Maternal Grandfather     Diabetes Paternal Grandmother     Hemophilia Paternal Grandfather        Social History:  Social History     Tobacco Use    Smoking status: Former Smoker     Packs/day: 0.00     Years: 14.00     Pack years: 0.00     Last attempt to quit: 2014     Years since quittin.9    Smokeless tobacco: Never Used   Substance Use Topics    Alcohol use: No    Drug use: No       Allergies:   Allergies   Allergen Reactions    Morphine Nausea and Vomiting    Penicillin G Swelling    Percocet [Oxycodone-Acetaminophen] Hives and Nausea and Vomiting    Sulfa (Sulfonamide Antibiotics) Swelling    Tramadol Nausea Only     Nausea and headache         Current Medications:  Current Facility-Administered Medications on File Prior to Encounter   Medication Dose Route Frequency Provider Last Rate Last Dose    [DISCONTINUED] sodium chloride (NS) flush 5-40 mL  5-40 mL IntraVENous Q8H Akanksha Reed MD   10 mL at 19 0725    [DISCONTINUED] sodium chloride (NS) flush 5-40 mL  5-40 mL IntraVENous PRN Nura Ambriz MD        [DISCONTINUED] zolpidem (AMBIEN) tablet 5 mg 5 mg Oral QHS PRAGUSTO Matias MD        [DISCONTINUED] 0.9% sodium chloride infusion  100 mL/hr IntraVENous CONTINUOUS Rubi Reed  mL/hr at 12/02/19 0616 100 mL/hr at 12/02/19 0616    [DISCONTINUED] insulin NPH (NOVOLIN N, HUMULIN N) injection 80 Units  80 Units SubCUTAneous MOLINAB&D Lady Matias MD   80 Units at 12/03/19 0903    [DISCONTINUED] insulin regular (NOVOLIN R, HUMULIN R) injection 10 Units  10 Units SubCUTAneous TIDAC Lady Matias MD   10 Units at 12/03/19 1148    [DISCONTINUED] butalbital-acetaminophen-caffeine (FIORICET, ESGIC) -40 mg per tablet 1 Tab  1 Tab Oral Q6H PRAGUSTO Matias MD   1 Tab at 12/01/19 1427    [DISCONTINUED] LORazepam (ATIVAN) tablet 0.5 mg  0.5 mg Oral DAILY PRN Lady Matias MD   0.5 mg at 12/03/19 0212    [DISCONTINUED] ticagrelor (BRILINTA) tablet 90 mg  90 mg Oral Q12H Lady Matias MD   90 mg at 12/03/19 1149    [DISCONTINUED] sodium chloride (NS) flush 5-40 mL  5-40 mL IntraVENous Q8H Rubi Reed MD   10 mL at 12/02/19 2231    [DISCONTINUED] sodium chloride (NS) flush 5-40 mL  5-40 mL IntraVENous PRAGUSTO Matias MD        [DISCONTINUED] zolpidem (AMBIEN) tablet 5 mg  5 mg Oral QHS PRAGUSTO Matias MD        [DISCONTINUED] pantoprazole (PROTONIX) tablet 40 mg  40 mg Oral NENO MATHEWS MD   40 mg at 12/03/19 6724    [DISCONTINUED] calcium carbonate (TUMS) chewable tablet 200 mg [elemental]  200 mg Oral TID PRN Lady Matias MD   200 mg at 11/29/19 1205    [DISCONTINUED] metoprolol tartrate (LOPRESSOR) tablet 12.5 mg  12.5 mg Oral Q12H Lady Matias MD   12.5 mg at 12/03/19 0821    [DISCONTINUED] sodium chloride (NS) flush 5-40 mL  5-40 mL IntraVENous Q8H Rubi Reed MD   10 mL at 12/02/19 2232    [DISCONTINUED] sodium chloride (NS) flush 5-40 mL  5-40 mL IntraVENous PRN Lady Matias MD   10 mL at 11/29/19 0750    [DISCONTINUED] ondansetron (ZOFRAN) injection 4 mg  4 mg IntraVENous Q6H PRN Yuliya Gonzalez MD        [DISCONTINUED] bisacodyl (DULCOLAX) tablet 5 mg  5 mg Oral DAILY PRN Yuliya Gonzalez MD        [DISCONTINUED] enoxaparin (LOVENOX) injection 40 mg  40 mg SubCUTAneous Q24H Gasper Reed MD   40 mg at 12/01/19 0532    [DISCONTINUED] glucose chewable tablet 16 g  4 Tab Oral PRN Yuliya Gonzalez MD        [DISCONTINUED] dextrose (D50W) injection syrg 12.5-25 g  25-50 mL IntraVENous PRN Yuliya Gonzalez MD        [DISCONTINUED] glucagon (GLUCAGEN) injection 1 mg  1 mg IntraMUSCular PRN Yuliya Gonzalez MD        [DISCONTINUED] insulin lispro (HUMALOG) injection   SubCUTAneous AC&HS Yuliya Gonzalez MD   3 Units at 12/03/19 1149    [DISCONTINUED] rosuvastatin (CRESTOR) tablet 20 mg  20 mg Oral QHS Yuliya Gonzalez MD   20 mg at 12/02/19 2237    [DISCONTINUED] fenofibrate nanocrystallized (TRICOR) tablet 145 mg  145 mg Oral DAILY Yuliya Gonzalez MD   145 mg at 12/03/19 4311    [DISCONTINUED] levothyroxine (SYNTHROID) tablet 100 mcg  100 mcg Oral Alesia King MD   100 mcg at 12/03/19 0725    [DISCONTINUED] sertraline (ZOLOFT) tablet 100 mg  100 mg Oral DAILY Yuliya Gonzalez MD   100 mg at 12/03/19 2203    [DISCONTINUED] cyclobenzaprine (FLEXERIL) tablet 10 mg  10 mg Oral TID PRN Yuliya Gonzalez MD        [DISCONTINUED] aspirin delayed-release tablet 81 mg  81 mg Oral DAILY Gasper Reed MD   81 mg at 12/03/19 4357    [DISCONTINUED] nitroglycerin (NITROSTAT) tablet 0.4 mg  0.4 mg SubLINGual Q5MIN PRN Yuliya Gonzalez MD   0.4 mg at 11/28/19 1817    [DISCONTINUED] fentaNYL citrate (PF) injection 25 mcg  25 mcg IntraVENous Q4H PRN Yuliya Gonzalez MD   25 mcg at 12/03/19 8607    [DISCONTINUED] busPIRone (BUSPAR) tablet 15 mg  15 mg Oral BID Yuliya Gonzalez MD   15 mg at 12/03/19 7874     Current Outpatient Medications on File Prior to Encounter   Medication Sig Dispense Refill    lisinopril (PRINIVIL, ZESTRIL) 10 mg tablet TAKE 1 TABLET BY MOUTH ONCE DAILY 90 Tab 3    rosuvastatin (CRESTOR) 20 mg tablet Take 1 Tab by mouth nightly. 90 Tab 3    ticagrelor (BRILINTA) 90 mg tablet Take 1 Tab by mouth every twelve (12) hours every twelve (12) hours. 60 Tab 12    metoprolol succinate (TOPROL-XL) 25 mg XL tablet Take 1 Tab by mouth daily. 90 Tab 3    icosapent ethyl (VASCEPA) 1 gram capsule Take 1 Cap by mouth two (2) times daily (with meals). 30 Cap 1    LORazepam (ATIVAN) 0.5 mg tablet TAKE 1 TABLET BY MOUTH ONCE DAILY AS NEEDED 30 Tab 0    cholecalciferol, VITAMIN D3, (VITAMIN D3) 5,000 unit tab tablet Take 1 Tab by mouth daily. 90 Tab 1    clomiPHENE (CLOMID) 50 mg tablet One pill every other day 15 Tab 3    lidocaine, PF, (XYLOCAINE) 10 mg/mL (1 %) injection Given in office 1 Vial 0    chlorthalidone (HYGROTEN) 25 mg tablet Take 1 Tab by mouth daily. 90 Tab 1    galcanezumab-gnlm (EMGALITY PEN) 120 mg/mL injection 1 mL by SubCUTAneous route every thirty (30) days. 1 mL 11    cyclobenzaprine (FLEXERIL) 10 mg tablet Take 10 mg by mouth three (3) times daily as needed for Muscle Spasm(s).  aspirin delayed-release 81 mg tablet Take 1 Tab by mouth daily. 30 Tab 0    butalbital-acetaminophen-caffeine (FIORICET, ESGIC) -40 mg per tablet TAKE 1 TABLET BY MOUTH AT THE ONSET OF HEADACHE. CAN TAKE TWICE DAILY BUT NOT MORE THAN 10 DAYS/MONTH.ONLY TO BE FILLED IN 1 MONTH INTERVALS 20 Tab 2    busPIRone (BUSPAR) 15 mg tablet Take 1 Tab by mouth three (3) times daily. (Patient taking differently: Take 15 mg by mouth two (2) times a day.) 90 Tab 3    sertraline (ZOLOFT) 100 mg tablet Take 1 Tab by mouth daily.  insulin NPH (NOVOLIN N NPH U-100 INSULIN) 100 unit/mL injection by SubCUTAneous route once.  80 units twice a day      levothyroxine (SYNTHROID) 112 mcg tablet TAKE 1 TABLET BY MOUTH ONCE DAILY BEFORE BREAKFAST 90 Tab 3    insulin regular (NOVOLIN R, HUMULIN R) 100 unit/mL injection 15-20 units TID AC (Patient taking differently: 10 units 2-3 times per day  Indications: 0-20u BID, per pt) 1 Vial 12       Review of Systems   Review of Systems   Respiratory: Negative for shortness of breath. Cardiovascular: Positive for chest pain. All other systems reviewed and are negative. Physical Exam   Vital Signs  Patient Vitals for the past 24 hrs:   Temp Pulse Resp BP SpO2   12/04/19 1326 -- 85 -- 117/51 --   12/04/19 1116 97.9 °F (36.6 °C) 90 18 173/79 98 %       Physical Exam  Vitals signs and nursing note reviewed. Constitutional:       General: He is not in acute distress. Appearance: He is well-developed. Comments: Obese, no distress   HENT:      Head: Normocephalic and atraumatic. Eyes:      General:         Right eye: No discharge. Left eye: No discharge. Conjunctiva/sclera: Conjunctivae normal.   Neck:      Musculoskeletal: Normal range of motion and neck supple. Cardiovascular:      Rate and Rhythm: Normal rate and regular rhythm. Heart sounds: Normal heart sounds. No murmur. Pulmonary:      Effort: Pulmonary effort is normal. No respiratory distress. Breath sounds: Normal breath sounds. No wheezing. Abdominal:      General: There is no distension. Palpations: Abdomen is soft. Tenderness: There is no tenderness. Musculoskeletal: Normal range of motion. General: No deformity. Skin:     General: Skin is warm and dry. Findings: No rash. Neurological:      Mental Status: He is alert and oriented to person, place, and time. Psychiatric:         Behavior: Behavior normal.         Thought Content:  Thought content normal.         Diagnostic Study Results   Labs  Recent Results (from the past 24 hour(s))   EKG, 12 LEAD, INITIAL    Collection Time: 12/04/19 11:11 AM   Result Value Ref Range    Ventricular Rate 84 BPM    Atrial Rate 84 BPM    P-R Interval 152 ms QRS Duration 88 ms    Q-T Interval 340 ms    QTC Calculation (Bezet) 401 ms    Calculated P Axis 39 degrees    Calculated R Axis 33 degrees    Calculated T Axis 52 degrees    Diagnosis       Normal sinus rhythm  Nonspecific T wave abnormality  When compared with ECG of 02-DEC-2019 11:18,  No significant change was found  Confirmed by Kel Ho ([id number]) on 12/4/2019 12:47:36 PM     CBC WITH AUTOMATED DIFF    Collection Time: 12/04/19 11:55 AM   Result Value Ref Range    WBC 8.2 4.1 - 11.1 K/uL    RBC 4.82 4.10 - 5.70 M/uL    HGB 13.6 12.1 - 17.0 g/dL    HCT 41.5 36.6 - 50.3 %    MCV 86.1 80.0 - 99.0 FL    MCH 28.2 26.0 - 34.0 PG    MCHC 32.8 30.0 - 36.5 g/dL    RDW 13.2 11.5 - 14.5 %    PLATELET 375 222 - 464 K/uL    MPV 10.5 8.9 - 12.9 FL    NRBC 0.0 0  WBC    ABSOLUTE NRBC 0.00 0.00 - 0.01 K/uL    NEUTROPHILS 62 32 - 75 %    LYMPHOCYTES 26 12 - 49 %    MONOCYTES 6 5 - 13 %    EOSINOPHILS 3 0 - 7 %    BASOPHILS 1 0 - 1 %    IMMATURE GRANULOCYTES 2 (H) 0.0 - 0.5 %    ABS. NEUTROPHILS 5.1 1.8 - 8.0 K/UL    ABS. LYMPHOCYTES 2.1 0.8 - 3.5 K/UL    ABS. MONOCYTES 0.5 0.0 - 1.0 K/UL    ABS. EOSINOPHILS 0.3 0.0 - 0.4 K/UL    ABS. BASOPHILS 0.1 0.0 - 0.1 K/UL    ABS. IMM. GRANS. 0.1 (H) 0.00 - 0.04 K/UL    DF AUTOMATED     METABOLIC PANEL, BASIC    Collection Time: 12/04/19 11:55 AM   Result Value Ref Range    Sodium 135 (L) 136 - 145 mmol/L    Potassium 4.2 3.5 - 5.1 mmol/L    Chloride 100 97 - 108 mmol/L    CO2 27 21 - 32 mmol/L    Anion gap 8 5 - 15 mmol/L    Glucose 305 (H) 65 - 100 mg/dL    BUN 20 6 - 20 MG/DL    Creatinine 1.44 (H) 0.70 - 1.30 MG/DL    BUN/Creatinine ratio 14 12 - 20      GFR est AA >60 >60 ml/min/1.73m2    GFR est non-AA 54 (L) >60 ml/min/1.73m2    Calcium 9.1 8.5 - 10.1 MG/DL   TROPONIN I    Collection Time: 12/04/19 11:55 AM   Result Value Ref Range    Troponin-I, Qt. <0.05 <0.05 ng/mL       Radiologic Studies  XR CHEST PA LAT   Final Result   IMPRESSION: Normal two view chest x-ray. CT Results  (Last 48 hours)    None        CXR Results  (Last 48 hours)               12/04/19 1210  XR CHEST PA LAT Final result    Impression:  IMPRESSION: Normal two view chest x-ray. Narrative:  INDICATION: left chest pain, recent cardia cath       EXAM: CXR 2 View       FINDINGS: Frontal and lateral views of the chest show clear lungs. Heart size is   normal. There is no pulmonary edema. There is no evident pneumothorax,   adenopathy or effusion. Procedures   EKG  Date/Time: 12/4/2019 12:27 PM  Performed by: Portillo Gray MD  Authorized by: Portillo Gray MD     ECG reviewed by ED Physician in the absence of a cardiologist: yes    Interpretation:     Interpretation: non-specific    Rate:     ECG rate assessment: normal    Rhythm:     Rhythm: sinus rhythm    Ectopy:     Ectopy: none    QRS:     QRS axis:  Normal  ST segments:     ST segments:  Normal  T waves:     T waves: normal          Medical Decision Making     Provider Notes (Medical Decision Making):   54-year-old male with type 1 diabetes, severe CAD, just discharged from the hospital yesterday after placement of multiple stents to multiple coronary arteries, complaining of focal left chest pain, which he had at the time of discharge and is not significantly improved. EKG without signs of acute ischemia. No STEMI. We will check laboratories, troponin, chest x-ray. Plan for cardiology consult. We will treat his blood pressure with amlodipine and nitroglycerin, monitor for improvement in chest pain. Melvi Mccollum MD  12:25 PM    Troponin negative. ECG non-ischemic. Spoke with Dr. Whit Galarza (was in the ED) who feels pt's pain is noncardiac in origin, or at least not ACS/unstable angina. Does not recommend additional telemetry monitoring. No evidence of Dressler's or pericarditis. Given prolonged time course of ongoing pain, will defer additional troponin testing.   Pt to try NTG PRN, Tylenol for discomfort, and follow up closely with Dr. Jerman Delatorre. Penny Saab MD        Consult required? Yes cardiology consult on persistent chest pain after placement of multiple stents      Medications Administered During ED Course:  Medications   nitroglycerin (NITROSTAT) tablet 0.4 mg (0.4 mg SubLINGual Given 12/4/19 1326)   metoprolol (LOPRESSOR) injection 5 mg (has no administration in time range)   insulin regular (NOVOLIN R, HUMULIN R) injection 10 Units (10 Units IntraVENous Given 12/4/19 1310)          Diagnosis and Disposition     Disposition:  Discharged    Clinical Impression:   1. Atypical chest pain        Attestation:  I personally performed the services described in this documentation on this date 12/4/2019 for patient Cathie Alford. Zurdo Payne MD        I was the first provider for this patient on this visit. To the best of my ability I reviewed relevant prior medical records, electrocardiograms, laboratories, and radiologic studies. The patient's presenting problems were discussed, and the patient was in agreement with the care plan formulated and outlined with them. Zurdo Payne MD    Please note that this dictation was completed with Dragon voice recognition software. Quite often unanticipated grammatical, syntax, homophones, and other interpretive errors are inadvertently transcribed by the computer software. Please disregard these errors and excuse any errors that have escaped final proofreading.

## 2019-12-04 NOTE — ED NOTES
Patient's blood pressure is within normal limits. Jaylyn Chang MD. Verbal order received to hold Metoprolol & Nitroglycerin. Patient updated on plan of care.

## 2019-12-06 ENCOUNTER — TELEPHONE (OUTPATIENT)
Dept: CARDIAC REHAB | Age: 39
End: 2019-12-06

## 2019-12-06 NOTE — TELEPHONE ENCOUNTER
Spoke w pt re [facility]. Stated his short term disability is running out soon and hopes to get long term disability. Reminded to do some activity 3-5 x week like walks or join a gym. Pt asked if being SOB when lying down and sleeping was normal. Suggested he call Dr. Nichole León office today and provided him with contact number. Reminded if SOB becomes worse to call 911.

## 2019-12-13 DIAGNOSIS — E10.9 TYPE 1 DIABETES MELLITUS WITHOUT COMPLICATION (HCC): Primary | ICD-10-CM

## 2019-12-13 NOTE — TELEPHONE ENCOUNTER
12/13/2019  3:17 PM      Ms. Makayla Jones calling on behalf of Brittnee Gudino stated that he was taken of Metformin when he was in the hospital and would like a call back.     Please call [phone]    Thanks

## 2019-12-13 NOTE — TELEPHONE ENCOUNTER
Patient states was taken off Metformin when he had contrast dye. He said the hospital told him to have his kidneys tested via lab before he starts taking Metformin again. Please order. Advised patient can go to the lab on Monday.

## 2019-12-17 ENCOUNTER — TELEPHONE (OUTPATIENT)
Dept: ENDOCRINOLOGY | Age: 39
End: 2019-12-17

## 2019-12-17 NOTE — TELEPHONE ENCOUNTER
FYI: Patient states will get labs drawn tomorrow and bring 2 weeks of blood sugar readings here for review. He states that he has been off of Metformin for about 2 weeks due to kidneys.

## 2019-12-17 NOTE — TELEPHONE ENCOUNTER
----- Message from Sabine Farrell sent at 12/17/2019  2:33 PM EST -----  Regarding: Dr Deborah Anderson  General Message/Vendor Calls    Caller's first and last name:      Reason for call: requesting a call from Prosper Smith regarding medicine.       Callback required yes/no and why:      Best contact number(s):[phone]      Details to clarify the request:      Sabine Farrell

## 2019-12-19 ENCOUNTER — TELEPHONE (OUTPATIENT)
Dept: ENDOCRINOLOGY | Age: 39
End: 2019-12-19

## 2019-12-19 LAB
BUN SERPL-MCNC: 18 MG/DL (ref 6–24)
BUN/CREAT SERPL: 14 (ref 9–20)
CALCIUM SERPL-MCNC: 9.4 MG/DL (ref 8.7–10.2)
CHLORIDE SERPL-SCNC: 96 MMOL/L (ref 96–106)
CO2 SERPL-SCNC: 22 MMOL/L (ref 20–29)
CREAT SERPL-MCNC: 1.27 MG/DL (ref 0.76–1.27)
GLUCOSE SERPL-MCNC: NORMAL MG/DL
POTASSIUM SERPL-SCNC: NORMAL MMOL/L
SODIUM SERPL-SCNC: 135 MMOL/L (ref 134–144)

## 2019-12-19 NOTE — TELEPHONE ENCOUNTER
Patient has been given the contact information for Social Security. Encouraged to call them. Patient expressed understanding.

## 2019-12-19 NOTE — TELEPHONE ENCOUNTER
FYI:  Patient came in stating that he is still having headaches. He states he needs neck surgery to help. He said that [address] is \"cutting off\" his short term disability. His company does not offer long term disability. He wonders if  has any suggestions about possible state disability or any other advice, for he fears that he may not be hirable after the heart surgery. Advised VM, per 's recommendation, that this message will be sent to the  for this practice and this issue will be discussed at his next office visit next week.

## 2020-01-08 ENCOUNTER — TELEPHONE (OUTPATIENT)
Dept: CARDIAC REHAB | Age: 40
End: 2020-01-08

## 2020-01-08 ENCOUNTER — HOSPITAL ENCOUNTER (EMERGENCY)
Age: 40
Discharge: HOME | End: 2020-01-08
Attending: EMERGENCY MEDICINE
Payer: COMMERCIAL

## 2020-01-08 ENCOUNTER — APPOINTMENT (OUTPATIENT)
Dept: GENERAL RADIOLOGY | Age: 40
End: 2020-01-08
Attending: EMERGENCY MEDICINE
Payer: COMMERCIAL

## 2020-01-08 VITALS
OXYGEN SATURATION: 96 % | WEIGHT: 246.03 LBS | BODY MASS INDEX: 36.44 KG/M2 | TEMPERATURE: 98.8 F | SYSTOLIC BLOOD PRESSURE: 136 MMHG | HEART RATE: 98 BPM | DIASTOLIC BLOOD PRESSURE: 79 MMHG | RESPIRATION RATE: 19 BRPM | HEIGHT: 69 IN

## 2020-01-08 DIAGNOSIS — G43.009 MIGRAINE WITHOUT AURA AND WITHOUT STATUS MIGRAINOSUS, NOT INTRACTABLE: ICD-10-CM

## 2020-01-08 DIAGNOSIS — B34.9 VIRAL ILLNESS: Primary | ICD-10-CM

## 2020-01-08 DIAGNOSIS — M79.10 MYALGIA: ICD-10-CM

## 2020-01-08 LAB
ALBUMIN SERPL-MCNC: 3.6 G/DL (ref 3.5–5)
ALBUMIN/GLOB SERPL: 0.9 {RATIO} (ref 1.1–2.2)
ALP SERPL-CCNC: 86 U/L (ref 45–117)
ALT SERPL-CCNC: 43 U/L (ref 12–78)
ANION GAP SERPL CALC-SCNC: 8 MMOL/L (ref 5–15)
AST SERPL-CCNC: 16 U/L (ref 15–37)
BASOPHILS # BLD: 0.1 K/UL (ref 0–0.1)
BASOPHILS NFR BLD: 1 % (ref 0–1)
BILIRUB SERPL-MCNC: 0.9 MG/DL (ref 0.2–1)
BUN SERPL-MCNC: 20 MG/DL (ref 6–20)
BUN/CREAT SERPL: 14 (ref 12–20)
CALCIUM SERPL-MCNC: 9.3 MG/DL (ref 8.5–10.1)
CHLORIDE SERPL-SCNC: 101 MMOL/L (ref 97–108)
CO2 SERPL-SCNC: 26 MMOL/L (ref 21–32)
CREAT SERPL-MCNC: 1.4 MG/DL (ref 0.7–1.3)
DIFFERENTIAL METHOD BLD: ABNORMAL
EOSINOPHIL # BLD: 0.1 K/UL (ref 0–0.4)
EOSINOPHIL NFR BLD: 1 % (ref 0–7)
ERYTHROCYTE [DISTWIDTH] IN BLOOD BY AUTOMATED COUNT: 13.4 % (ref 11.5–14.5)
FLUAV AG NPH QL IA: NEGATIVE
FLUBV AG NOSE QL IA: NEGATIVE
GLOBULIN SER CALC-MCNC: 4.1 G/DL (ref 2–4)
GLUCOSE SERPL-MCNC: 311 MG/DL (ref 65–100)
HCT VFR BLD AUTO: 40.2 % (ref 36.6–50.3)
HGB BLD-MCNC: 13.5 G/DL (ref 12.1–17)
IMM GRANULOCYTES # BLD AUTO: 0.1 K/UL (ref 0–0.04)
IMM GRANULOCYTES NFR BLD AUTO: 1 % (ref 0–0.5)
LACTATE BLD-SCNC: 2.3 MMOL/L (ref 0.4–2)
LYMPHOCYTES # BLD: 1 K/UL (ref 0.8–3.5)
LYMPHOCYTES NFR BLD: 10 % (ref 12–49)
MCH RBC QN AUTO: 28.4 PG (ref 26–34)
MCHC RBC AUTO-ENTMCNC: 33.6 G/DL (ref 30–36.5)
MCV RBC AUTO: 84.6 FL (ref 80–99)
MONOCYTES # BLD: 0.8 K/UL (ref 0–1)
MONOCYTES NFR BLD: 8 % (ref 5–13)
NEUTS SEG # BLD: 8.2 K/UL (ref 1.8–8)
NEUTS SEG NFR BLD: 79 % (ref 32–75)
NRBC # BLD: 0 K/UL (ref 0–0.01)
NRBC BLD-RTO: 0 PER 100 WBC
PLATELET # BLD AUTO: 175 K/UL (ref 150–400)
PMV BLD AUTO: 10 FL (ref 8.9–12.9)
POTASSIUM SERPL-SCNC: 4.3 MMOL/L (ref 3.5–5.1)
PROT SERPL-MCNC: 7.7 G/DL (ref 6.4–8.2)
RBC # BLD AUTO: 4.75 M/UL (ref 4.1–5.7)
SODIUM SERPL-SCNC: 135 MMOL/L (ref 136–145)
WBC # BLD AUTO: 10.2 K/UL (ref 4.1–11.1)

## 2020-01-08 PROCEDURE — 36415 COLL VENOUS BLD VENIPUNCTURE: CPT

## 2020-01-08 PROCEDURE — 85025 COMPLETE CBC W/AUTO DIFF WBC: CPT

## 2020-01-08 PROCEDURE — 80053 COMPREHEN METABOLIC PANEL: CPT

## 2020-01-08 PROCEDURE — 74011250636 HC RX REV CODE- 250/636: Performed by: EMERGENCY MEDICINE

## 2020-01-08 PROCEDURE — 96374 THER/PROPH/DIAG INJ IV PUSH: CPT

## 2020-01-08 PROCEDURE — 99284 EMERGENCY DEPT VISIT MOD MDM: CPT

## 2020-01-08 PROCEDURE — 87804 INFLUENZA ASSAY W/OPTIC: CPT

## 2020-01-08 PROCEDURE — 96375 TX/PRO/DX INJ NEW DRUG ADDON: CPT

## 2020-01-08 PROCEDURE — 83605 ASSAY OF LACTIC ACID: CPT

## 2020-01-08 PROCEDURE — 71046 X-RAY EXAM CHEST 2 VIEWS: CPT

## 2020-01-08 RX ORDER — KETOROLAC TROMETHAMINE 30 MG/ML
30 INJECTION, SOLUTION INTRAMUSCULAR; INTRAVENOUS ONCE
Status: COMPLETED | OUTPATIENT
Start: 2020-01-08 | End: 2020-01-08

## 2020-01-08 RX ORDER — ACETAMINOPHEN 500 MG
1000 TABLET ORAL ONCE
Status: DISCONTINUED | OUTPATIENT
Start: 2020-01-08 | End: 2020-01-08 | Stop reason: HOSPADM

## 2020-01-08 RX ORDER — METOCLOPRAMIDE HYDROCHLORIDE 5 MG/ML
10 INJECTION INTRAMUSCULAR; INTRAVENOUS
Status: COMPLETED | OUTPATIENT
Start: 2020-01-08 | End: 2020-01-08

## 2020-01-08 RX ORDER — IBUPROFEN 600 MG/1
600 TABLET ORAL
Qty: 20 TAB | Refills: 0 | Status: SHIPPED | OUTPATIENT
Start: 2020-01-08 | End: 2021-02-17

## 2020-01-08 RX ADMIN — KETOROLAC TROMETHAMINE 30 MG: 30 INJECTION, SOLUTION INTRAMUSCULAR at 10:07

## 2020-01-08 RX ADMIN — METOCLOPRAMIDE 10 MG: 5 INJECTION, SOLUTION INTRAMUSCULAR; INTRAVENOUS at 10:07

## 2020-01-08 RX ADMIN — SODIUM CHLORIDE 1000 ML: 900 INJECTION, SOLUTION INTRAVENOUS at 10:07

## 2020-01-08 NOTE — ED PROVIDER NOTES
EMERGENCY DEPARTMENT HISTORY AND PHYSICAL EXAM      Date: 1/8/2020  Patient Name: Francisca Mullins  Patient Age and Sex: 35 y.o. male    History of Presenting Illness     Chief Complaint   Patient presents with    Vomiting     began vomiting last night    Diarrhea     diarrhea x3-4 days    Headache     x3-4 days       History Provided By: Patient    Ability to gather history was limited by: None    HPI: Francisca Mullins, 35 y.o. male complains of flulike illness for the past 3 to 4 days. He has had nausea, vomiting, diarrhea, headache, generalized body aches. No respiratory symptoms or cough. No rashes. No unusual neck pain or stiffness or new back pain. His headache is throbbing, 8 out of 10 severity, did not respond to Fioricet. He has a history of migraines with similar symptoms. Pt denies any other alleviating or exacerbating factors. There are no other complaints, changes or physical findings at this time.      Past Medical History:   Diagnosis Date    Anxiety     Chronic headaches 2007    Depression     Diabetes ([address]     GERD (gastroesophageal reflux disease)     Hypercholesterolemia     Hypertension     Thyroid disease     hypothyroid    Unspecified sleep apnea     does not use CPAP     Past Surgical History:   Procedure Laterality Date    HX CHOLECYSTECTOMY  8/26/14    Dr Tonny Marrero    HX JESSICAENT      wisdom teeth removed    HX ORTHOPAEDIC Left 2012    carpel tunnel       PCP: Trae Law MD    Past History     Past Medical History:  Past Medical History:   Diagnosis Date    Anxiety     Chronic headaches 2007    Depression     Diabetes ([address]     GERD (gastroesophageal reflux disease)     Hypercholesterolemia     Hypertension     Thyroid disease     hypothyroid    Unspecified sleep apnea     does not use CPAP       Past Surgical History:  Past Surgical History:   Procedure Laterality Date    HX CHOLECYSTECTOMY  8/26/14    Dr Tonny Marrero    HX JESSICAENT      wisdom teeth removed  HX ORTHOPAEDIC Left 2012    carpel tunnel       Family History:  Family History   Problem Relation Age of Onset    Diabetes Mother     Heart Disease Father     Cancer Father     Diabetes Father     Diabetes Paternal Aunt     Diabetes Maternal Grandmother     Thyroid Cancer Maternal Grandmother     Kidney Disease Maternal Grandmother     Arthritis Maternal Grandmother     Heart Disease Maternal Grandfather     High Cholesterol Maternal Grandfather     Hypertension Maternal Grandfather     Diabetes Paternal Grandmother     Hemophilia Paternal Grandfather        Social History:  Social History     Tobacco Use    Smoking status: Former Smoker     Packs/day: 0.00     Years: 14.00     Pack years: 0.00     Last attempt to quit: 2014     Years since quittin.0    Smokeless tobacco: Never Used   Substance Use Topics    Alcohol use: No    Drug use: No       Allergies: Allergies   Allergen Reactions    Morphine Nausea and Vomiting    Penicillin G Swelling    Percocet [Oxycodone-Acetaminophen] Hives and Nausea and Vomiting    Sulfa (Sulfonamide Antibiotics) Swelling    Tramadol Nausea Only     Nausea and headache         Current Medications:  No current facility-administered medications on file prior to encounter. Current Outpatient Medications on File Prior to Encounter   Medication Sig Dispense Refill    nitroglycerin (NITROSTAT) 0.4 mg SL tablet Take 1 Tab by mouth every five (5) minutes as needed for Chest Pain. Sit down then put one tab under the tongue every 5 minutes as needed 1 Bottle 0    lisinopril (PRINIVIL, ZESTRIL) 10 mg tablet TAKE 1 TABLET BY MOUTH ONCE DAILY 90 Tab 3    rosuvastatin (CRESTOR) 20 mg tablet Take 1 Tab by mouth nightly. 90 Tab 3    ticagrelor (BRILINTA) 90 mg tablet Take 1 Tab by mouth every twelve (12) hours every twelve (12) hours. 60 Tab 12    metoprolol succinate (TOPROL-XL) 25 mg XL tablet Take 1 Tab by mouth daily.  90 Tab 3    icosapent ethyl (VASCEPA) 1 gram capsule Take 1 Cap by mouth two (2) times daily (with meals). 30 Cap 1    LORazepam (ATIVAN) 0.5 mg tablet TAKE 1 TABLET BY MOUTH ONCE DAILY AS NEEDED 30 Tab 0    cholecalciferol, VITAMIN D3, (VITAMIN D3) 5,000 unit tab tablet Take 1 Tab by mouth daily. 90 Tab 1    clomiPHENE (CLOMID) 50 mg tablet One pill every other day 15 Tab 3    lidocaine, PF, (XYLOCAINE) 10 mg/mL (1 %) injection Given in office 1 Vial 0    chlorthalidone (HYGROTEN) 25 mg tablet Take 1 Tab by mouth daily. 90 Tab 1    galcanezumab-gnlm (EMGALITY PEN) 120 mg/mL injection 1 mL by SubCUTAneous route every thirty (30) days. 1 mL 11    cyclobenzaprine (FLEXERIL) 10 mg tablet Take 10 mg by mouth three (3) times daily as needed for Muscle Spasm(s).  aspirin delayed-release 81 mg tablet Take 1 Tab by mouth daily. 30 Tab 0    butalbital-acetaminophen-caffeine (FIORICET, ESGIC) -40 mg per tablet TAKE 1 TABLET BY MOUTH AT THE ONSET OF HEADACHE. CAN TAKE TWICE DAILY BUT NOT MORE THAN 10 DAYS/MONTH.ONLY TO BE FILLED IN 1 MONTH INTERVALS 20 Tab 2    busPIRone (BUSPAR) 15 mg tablet Take 1 Tab by mouth three (3) times daily. (Patient taking differently: Take 15 mg by mouth two (2) times a day.) 90 Tab 3    sertraline (ZOLOFT) 100 mg tablet Take 1 Tab by mouth daily.  insulin NPH (NOVOLIN N NPH U-100 INSULIN) 100 unit/mL injection by SubCUTAneous route once. 80 units twice a day      levothyroxine (SYNTHROID) 112 mcg tablet TAKE 1 TABLET BY MOUTH ONCE DAILY BEFORE BREAKFAST 90 Tab 3    insulin regular (NOVOLIN R, HUMULIN R) 100 unit/mL injection 15-20 units TID AC (Patient taking differently: 10 units 2-3 times per day  Indications: 0-20u BID, per pt) 1 Vial 12       Review of Systems   Review of Systems   Constitutional: Positive for fatigue and fever. Respiratory: Negative for cough and shortness of breath. Gastrointestinal: Positive for diarrhea and nausea. Negative for abdominal pain.    Musculoskeletal: Positive for myalgias. Negative for back pain and neck pain. Neurological: Positive for headaches. All other systems reviewed and are negative. Physical Exam   Vital Signs  Patient Vitals for the past 24 hrs:   Temp Pulse Resp BP SpO2   01/08/20 1144 98.8 °F (37.1 °C) -- -- -- --   01/08/20 1130 -- 98 19 -- 96 %   01/08/20 1015 -- 95 19 136/79 96 %   01/08/20 1000 -- 99 23 140/77 96 %   01/08/20 0937 -- 100 16 -- 99 %   01/08/20 0934 -- 98 -- 116/56 97 %   01/08/20 0922 (!) 100.6 °F (38.1 °C) 100 15 (!) 138/116 100 %       Physical Exam  Vitals signs and nursing note reviewed. Constitutional:       General: He is not in acute distress. Appearance: He is well-developed. HENT:      Head: Normocephalic and atraumatic. Eyes:      General:         Right eye: No discharge. Left eye: No discharge. Conjunctiva/sclera: Conjunctivae normal.   Neck:      Musculoskeletal: Normal range of motion and neck supple. Cardiovascular:      Rate and Rhythm: Normal rate and regular rhythm. Heart sounds: Normal heart sounds. No murmur. Pulmonary:      Effort: Pulmonary effort is normal. No respiratory distress. Breath sounds: Normal breath sounds. No wheezing. Abdominal:      General: There is no distension. Palpations: Abdomen is soft. Tenderness: There is no tenderness. Musculoskeletal: Normal range of motion. General: No deformity. Skin:     General: Skin is warm and dry. Findings: No rash. Neurological:      Mental Status: He is alert and oriented to person, place, and time. Psychiatric:         Behavior: Behavior normal.         Thought Content:  Thought content normal.         Diagnostic Study Results   Labs  Recent Results (from the past 24 hour(s))   INFLUENZA A & B AG (RAPID TEST)    Collection Time: 01/08/20 10:00 AM   Result Value Ref Range    Influenza A Antigen NEGATIVE  NEG      Influenza B Antigen NEGATIVE  NEG     METABOLIC PANEL, COMPREHENSIVE Collection Time: 01/08/20 10:00 AM   Result Value Ref Range    Sodium 135 (L) 136 - 145 mmol/L    Potassium 4.3 3.5 - 5.1 mmol/L    Chloride 101 97 - 108 mmol/L    CO2 26 21 - 32 mmol/L    Anion gap 8 5 - 15 mmol/L    Glucose 311 (H) 65 - 100 mg/dL    BUN 20 6 - 20 MG/DL    Creatinine 1.40 (H) 0.70 - 1.30 MG/DL    BUN/Creatinine ratio 14 12 - 20      GFR est AA >60 >60 ml/min/1.73m2    GFR est non-AA 56 (L) >60 ml/min/1.73m2    Calcium 9.3 8.5 - 10.1 MG/DL    Bilirubin, total 0.9 0.2 - 1.0 MG/DL    ALT (SGPT) 43 12 - 78 U/L    AST (SGOT) 16 15 - 37 U/L    Alk. phosphatase 86 45 - 117 U/L    Protein, total 7.7 6.4 - 8.2 g/dL    Albumin 3.6 3.5 - 5.0 g/dL    Globulin 4.1 (H) 2.0 - 4.0 g/dL    A-G Ratio 0.9 (L) 1.1 - 2.2     POC LACTIC ACID    Collection Time: 01/08/20 10:16 AM   Result Value Ref Range    Lactic Acid (POC) 2.30 (HH) 0.40 - 2.00 mmol/L   CBC WITH AUTOMATED DIFF    Collection Time: 01/08/20 11:07 AM   Result Value Ref Range    WBC 10.2 4.1 - 11.1 K/uL    RBC 4.75 4.10 - 5.70 M/uL    HGB 13.5 12.1 - 17.0 g/dL    HCT 40.2 36.6 - 50.3 %    MCV 84.6 80.0 - 99.0 FL    MCH 28.4 26.0 - 34.0 PG    MCHC 33.6 30.0 - 36.5 g/dL    RDW 13.4 11.5 - 14.5 %    PLATELET 334 040 - 729 K/uL    MPV 10.0 8.9 - 12.9 FL    NRBC 0.0 0  WBC    ABSOLUTE NRBC 0.00 0.00 - 0.01 K/uL    NEUTROPHILS 79 (H) 32 - 75 %    LYMPHOCYTES 10 (L) 12 - 49 %    MONOCYTES 8 5 - 13 %    EOSINOPHILS 1 0 - 7 %    BASOPHILS 1 0 - 1 %    IMMATURE GRANULOCYTES 1 (H) 0.0 - 0.5 %    ABS. NEUTROPHILS 8.2 (H) 1.8 - 8.0 K/UL    ABS. LYMPHOCYTES 1.0 0.8 - 3.5 K/UL    ABS. MONOCYTES 0.8 0.0 - 1.0 K/UL    ABS. EOSINOPHILS 0.1 0.0 - 0.4 K/UL    ABS. BASOPHILS 0.1 0.0 - 0.1 K/UL    ABS. IMM. GRANS. 0.1 (H) 0.00 - 0.04 K/UL    DF AUTOMATED         Radiologic Studies  XR CHEST PA LAT   Final Result   IMPRESSION: No acute findings.         CT Results  (Last 48 hours)    None        CXR Results  (Last 48 hours)               01/08/20 1046  XR CHEST PA LAT Final result    Impression:  IMPRESSION: No acute findings. Narrative:  EXAM: XR CHEST PA LAT       INDICATION: headache, flu-like illness       COMPARISON: None. FINDINGS: PA and lateral radiographs of the chest demonstrate clear lungs. The   cardiac and mediastinal contours and pulmonary vascularity are normal. The bones   and soft tissues are within normal limits. Procedures   Procedures    Medical Decision Making     Provider Notes (Medical Decision Making):   79-year-old male with flulike symptoms, generalized body aches, headache, nausea, vomiting, diarrhea. Well-appearing, no significant distress. He has a mild borderline fever. No significant leukocytosis. Mild elevation in his lactate. This patient does not appear to have sepsis based on my H&P. He appears to have an uncomplicated flulike illness. I specifically considered the possibility of meningitis. He is quite well-appearing, neck is supple, negative Brudzinski sign. He also has a longstanding history of similar headaches. I spoke with the patient and his family member regarding the remote possibility of meningitis, we collectively agreed that the risks of pursuing spinal tap outweigh the benefits at this point. No evidence of pneumonia. At this time he will return home, plenty of ibuprofen and Tylenol, stay well-hydrated, and will return if worsening headache or neck pain/stiffness or any other concerning symptoms. Pamela Ortega MD  3:57 PM        Consult required? No      Medications Administered During ED Course:  Medications   ketorolac (TORADOL) injection 30 mg (30 mg IntraVENous Given 1/8/20 1007)   metoclopramide HCl (REGLAN) injection 10 mg (10 mg IntraVENous Given 1/8/20 1007)   sodium chloride 0.9 % bolus infusion 1,000 mL (0 mL IntraVENous IV Completed 1/8/20 1117)          Diagnosis and Disposition     Disposition:  Discharged    Clinical Impression:   1. Viral illness    2.  Myalgia 3. Migraine without aura and without status migrainosus, not intractable        Attestation:  I personally performed the services described in this documentation on this date 1/8/2020 for patient Tylor Huitron. Bill Chadwick MD        I was the first provider for this patient on this visit. To the best of my ability I reviewed relevant prior medical records, electrocardiograms, laboratories, and radiologic studies. The patient's presenting problems were discussed, and the patient was in agreement with the care plan formulated and outlined with them. Bill Chadwick MD    Please note that this dictation was completed with Dragon voice recognition software. Quite often unanticipated grammatical, syntax, homophones, and other interpretive errors are inadvertently transcribed by the computer software. Please disregard these errors and excuse any errors that have escaped final proofreading.

## 2020-01-08 NOTE — ED NOTES
AVS reviewed with pt/family who verbalized understanding. All pt concerns addressed and questions answered. Pt discharged at this time.

## 2020-01-08 NOTE — DISCHARGE INSTRUCTIONS
Your symptoms are most consistent with an uncomplicated flulike illness, although you tested negative for influenza virus. If you develop worsening headaches, unusual neck or back pain or stiffness, confusion or difficulty waking, or any other concerning symptoms that could potentially represent meningitis, return to the emergency department immediately. Take ibuprofen and Tylenol as needed every 3-4 hours, stay well-hydrated and follow-up closely with your primary care physician.

## 2020-01-13 DIAGNOSIS — G43.009 MIGRAINE WITHOUT AURA AND WITHOUT STATUS MIGRAINOSUS, NOT INTRACTABLE: ICD-10-CM

## 2020-01-13 RX ORDER — BUTALBITAL, ACETAMINOPHEN AND CAFFEINE 50; 325; 40 MG/1; MG/1; MG/1
TABLET ORAL
Qty: 20 TAB | Refills: 0 | Status: SHIPPED | OUTPATIENT
Start: 2020-01-13 | End: 2021-02-17

## 2020-01-15 ENCOUNTER — PATIENT OUTREACH (OUTPATIENT)
Dept: ENDOCRINOLOGY | Age: 40
End: 2020-01-15

## 2020-01-15 ENCOUNTER — OFFICE VISIT (OUTPATIENT)
Dept: ENDOCRINOLOGY | Age: 40
End: 2020-01-15

## 2020-01-15 VITALS
WEIGHT: 256 LBS | BODY MASS INDEX: 37.92 KG/M2 | DIASTOLIC BLOOD PRESSURE: 64 MMHG | HEART RATE: 75 BPM | SYSTOLIC BLOOD PRESSURE: 118 MMHG | HEIGHT: 69 IN

## 2020-01-15 DIAGNOSIS — E10.9 TYPE 1 DIABETES MELLITUS WITHOUT COMPLICATION (HCC): Primary | ICD-10-CM

## 2020-01-15 DIAGNOSIS — E29.1 HYPOGONADISM IN MALE: ICD-10-CM

## 2020-01-15 DIAGNOSIS — E03.9 ACQUIRED HYPOTHYROIDISM: ICD-10-CM

## 2020-01-15 LAB — HBA1C MFR BLD HPLC: 9.5 %

## 2020-01-15 RX ORDER — CLOPIDOGREL BISULFATE 75 MG/1
TABLET ORAL
COMMUNITY
Start: 2019-12-23 | End: 2021-10-31

## 2020-01-15 NOTE — PROGRESS NOTES
Chief Complaint   Patient presents with    Diabetes     pcp and pharmacy verified     History of Present Illness: Brooklynn Mendosa is a 35 y.o. male here for follow up of diabetes. Since our last visit in September 2019 pt was admitted for CP and found to have multivessel disease, requiring multiple stents, which were placed by Dr. Bozena Caal of Cardiology. He was also in the ED last week with issues of N/V. He was tested for influenza and it was negative. For his issues of vertigo, and dizziness he is followed by Dr. Sheldon Sotelo of Neurology. At our last visit we tested his Thyroid labs, which were normal, but he had a very low Testosterone of 199 on 10/22/19 and repeat was 240 in 10/28/19. His FSH, LH and prolactin were unremarkable. Pt opted to start on Clomid 50mg every other day. His A1C today was 9.5%. He brought in his BG logs, pt is checking his BGs 4 times per day his BGs are running from  througout the day. Pt notes that there are times he is skipping his R, because he is concerned about having a low BG. He ha been taking the NPH 80 units BID and has been taking 8-18 units of R. He notes that for the past few days he has not been eating much. He will get a low about once per month. He notes that his BGs were improving till his Cath and his BGs have been higher since. Pt was laid off because his company did not have long-term disability and he is not able to work, because of his MI and the vertigo. He has been working on cutting back on sodas and is adjusting his diet, drinking more water and ginger ale. He keeps his NPH and R vials at room temperature. Pt has hx of CAD s/p stenting in December 2019. Pt has hx of neuropathy. No hx of nephropathy. Last eye exam was \"I can not recall when the last time I saw an eye doctor\". Pt has hx of hypothyroidism, since the age of 11-9 years old. He is currently taking 112mcg daily.  He was biochemically euthyroid in October 2019.      Current Outpatient Medications   Medication Sig    butalbital-acetaminophen-caffeine (FIORICET, ESGIC) -40 mg per tablet TAKE 1 TABLET BY MOUTH AT THE ONSET OF HEADACHE. CAN TAKE TWICE DAILY BUT NOT MORE THAN 10 DAYS/MONTH.ONLY TO BE FILLED IN 1 MONTH INTERVALS    ibuprofen (MOTRIN) 600 mg tablet Take 1 Tab by mouth every six (6) hours as needed for Pain.  nitroglycerin (NITROSTAT) 0.4 mg SL tablet Take 1 Tab by mouth every five (5) minutes as needed for Chest Pain. Sit down then put one tab under the tongue every 5 minutes as needed    lisinopril (PRINIVIL, ZESTRIL) 10 mg tablet TAKE 1 TABLET BY MOUTH ONCE DAILY    rosuvastatin (CRESTOR) 20 mg tablet Take 1 Tab by mouth nightly.  metoprolol succinate (TOPROL-XL) 25 mg XL tablet Take 1 Tab by mouth daily. (Patient taking differently: Take 25 mg by mouth two (2) times a day.)    LORazepam (ATIVAN) 0.5 mg tablet TAKE 1 TABLET BY MOUTH ONCE DAILY AS NEEDED    cholecalciferol, VITAMIN D3, (VITAMIN D3) 5,000 unit tab tablet Take 1 Tab by mouth daily.  chlorthalidone (HYGROTEN) 25 mg tablet Take 1 Tab by mouth daily.  cyclobenzaprine (FLEXERIL) 10 mg tablet Take 10 mg by mouth three (3) times daily as needed for Muscle Spasm(s).  aspirin delayed-release 81 mg tablet Take 1 Tab by mouth daily.  busPIRone (BUSPAR) 15 mg tablet Take 1 Tab by mouth three (3) times daily. (Patient taking differently: Take 15 mg by mouth two (2) times a day.)    sertraline (ZOLOFT) 100 mg tablet Take 1 Tab by mouth daily.  insulin NPH (NOVOLIN N NPH U-100 INSULIN) 100 unit/mL injection by SubCUTAneous route once.  80 units twice a day    levothyroxine (SYNTHROID) 112 mcg tablet TAKE 1 TABLET BY MOUTH ONCE DAILY BEFORE BREAKFAST    insulin regular (NOVOLIN R, HUMULIN R) 100 unit/mL injection 15-20 units TID AC (Patient taking differently: 0-20 units 2-3 times per day  Indications: 0-20u BID, per pt)    clopidogrel (PLAVIX) 75 mg tab TAKE 1 TABLET BY MOUTH ONCE DAILY    ticagrelor (BRILINTA) 90 mg tablet Take 1 Tab by mouth every twelve (12) hours every twelve (12) hours.  icosapent ethyl (VASCEPA) 1 gram capsule Take 1 Cap by mouth two (2) times daily (with meals).  clomiPHENE (CLOMID) 50 mg tablet One pill every other day    lidocaine, PF, (XYLOCAINE) 10 mg/mL (1 %) injection Given in office    galcanezumab-gnlm (EMGALITY PEN) 120 mg/mL injection 1 mL by SubCUTAneous route every thirty (30) days. No current facility-administered medications for this visit. Allergies   Allergen Reactions    Morphine Nausea and Vomiting    Penicillin G Swelling    Percocet [Oxycodone-Acetaminophen] Hives and Nausea and Vomiting    Sulfa (Sulfonamide Antibiotics) Swelling    Tramadol Nausea Only     Nausea and headache       Review of Systems:  - Eyes: no blurry vision or double vision  - Cardiovascular: no chest pain  - Respiratory: no shortness of breath  - Musculoskeletal: no myalgias  - Neurological: no numbness/tingling in extremities    Physical Examination:  There were no vitals taken for this visit. - General: pleasant, no distress, good eye contact   - Neck: no carotid bruits  - Cardiovascular: regular, normal rate, nl s1 and s2, no m/r/g, 2+ DP pulses   - Respiratory: clear bilaterally  - Integumentary: no edema, no foot ulcers,   - Psychiatric: normal mood and affect    Diabetic foot exam:     Left Foot:   Visual Exam: callous - present   Pulse DP: 2+ (normal)   Filament test: normal sensation    Vibratory sensation: normal      Right Foot:   Visual Exam: callous - present   Pulse DP: 2+ (normal)   Filament test: normal sensation    Vibratory sensation: normal        Data Reviewed:   His A1C today was 9.5%.     Component      Latest Ref Rng & Units 1/8/2020 1/8/2020 1/8/2020          11:07 AM 10:00 AM 10:00 AM   WBC      4.1 - 11.1 K/uL 10.2     RBC      4.10 - 5.70 M/uL 4.75     HGB      12.1 - 17.0 g/dL 13.5     HCT      36.6 - 50.3 % 40.2 MCV      80.0 - 99.0 FL 84.6     MCH      26.0 - 34.0 PG 28.4     MCHC      30.0 - 36.5 g/dL 33.6     RDW      11.5 - 14.5 % 13.4     PLATELET      930 - 188 K/uL 175     MPV      8.9 - 12.9 FL 10.0     NRBC      0  WBC 0.0     ABSOLUTE NRBC      0.00 - 0.01 K/uL 0.00     NEUTROPHILS      32 - 75 % 79 (H)     LYMPHOCYTES      12 - 49 % 10 (L)     MONOCYTES      5 - 13 % 8     EOSINOPHILS      0 - 7 % 1     BASOPHILS      0 - 1 % 1     IMMATURE GRANULOCYTES      0.0 - 0.5 % 1 (H)     ABS. NEUTROPHILS      1.8 - 8.0 K/UL 8.2 (H)     ABS. LYMPHOCYTES      0.8 - 3.5 K/UL 1.0     ABS. MONOCYTES      0.0 - 1.0 K/UL 0.8     ABS. EOSINOPHILS      0.0 - 0.4 K/UL 0.1     ABS. BASOPHILS      0.0 - 0.1 K/UL 0.1     ABS. IMM. GRANS.      0.00 - 0.04 K/UL 0.1 (H)     DF       AUTOMATED     Sodium      136 - 145 mmol/L  135 (L)    Potassium      3.5 - 5.1 mmol/L  4.3    Chloride      97 - 108 mmol/L  101    CO2      21 - 32 mmol/L  26    Anion gap      5 - 15 mmol/L  8    Glucose      65 - 100 mg/dL  311 (H)    BUN      6 - 20 MG/DL  20    Creatinine      0.70 - 1.30 MG/DL  1.40 (H)    BUN/Creatinine ratio      12 - 20    14    GFR est AA      >60 ml/min/1.73m2  >60    GFR est non-AA      >60 ml/min/1.73m2  56 (L)    Calcium      8.5 - 10.1 MG/DL  9.3    Bilirubin, total      0.2 - 1.0 MG/DL  0.9    ALT (SGPT)      12 - 78 U/L  43    AST      15 - 37 U/L  16    Alk. phosphatase      45 - 117 U/L  86    Protein, total      6.4 - 8.2 g/dL  7.7    Albumin      3.5 - 5.0 g/dL  3.6    Globulin      2.0 - 4.0 g/dL  4.1 (H)    A-G Ratio      1.1 - 2.2    0.9 (L)    Influenza A Antigen      NEG     NEGATIVE   Influenza B Antigen      NEG     NEGATIVE       Assessment/Plan:   1) DM > His A1C today was 9.5%. Will decrease his NPH to 60 units in the AM and 60 units HS and increase his Regular 20 units with each meal plus 2:50 for BG > 150. Pt to check his BGs 4 times per day and mail his BG logs to me in 2 weeks.     2) Hypothyroidism > Pt is euthyroid on LT4 112mcg daily. 3) Hypogonadism > He has not been taking the Clomid, pt instructed to start. Pt voices understanding and agreement with the plan. Pain noted and pt was recommended to call his PCP for further evaluation and treatment, as needed    RTC 3 months    We spent 40 minutes of face to face time together and > 50% of the time was spent in counseling on diabetes management and determining what changes to make to his insulin regimen.             Copy sent to:  Dr. Suresh Lockwood

## 2020-01-15 NOTE — PROGRESS NOTES
Ambulatory Care Management Note      Date/Time:  1/15/2020 3:50 PM    This patient was received as a referral from Provider   Top Challenges reviewed with the provider   Patient has a hemoglobin A1C of 9.5. Patient has an average blood sugar of 226. Patient is on insulin therapy for diabetes management. Ambulatory  contacted patient for discussion and case management of diabetes   Summary of patients top problems:   1. Patient is a 35year old male with a diagnosis of diabetes mellitus, type 1. Patient has a medical history of severe obesity, unstable angina, HTN, HLD,  CAD and migraines. Patient has a recent surgical history of cardiac stent placement in December 2019. Patient is on Humulin R 18 units daily and NPH 80 units 2 times daily. Patient states skipping or decreasing his insulin due to fear of low blood sugar. ACM discuss in detail treatment of low blood sugar. Patient states having no income, but still have medical insurance with his last employer. ACM discussed with patient option for obtaining discounted or free medication. ACM referred patient to the disability office after patient states he believe he will no longer be able to work due to having frequent migraines, pinched nerve in his neck and heart problems. Patient will contact social security for help with obtaining disability benefits.   ACM also provide patient with resources for food to help with cost.  Patient's challenges to self management identified:   financial, ineffective coping, lack of knowledge about disease, level of motivation, polypharmacy    Medication Management:  good adherence, poor understanding    Advance Care Planning:   Does patient have an Advance Directive:  not on file; education provided    [employer], Referrals, and Durable Medical Equipment: n/a    PCP/Specialist follow up:   Future Appointments   Date Time Provider Aileen Smith   1/22/2020  3:00 PM Bubba Nichols MD [facility]   3/24/2020  2:00 PM Jero Corbett  [facility]   4/21/2020  4:10 PM Pop Delatorre MD RDE LOPEZ [address] [facility] Patient verbalizes understanding of self -management goals of living with Diabetes. 1/15/20  Patient will test his blood sugar 3-4 times daily and review with ACM in next 1 week. Patient verbalized understanding of all information discussed. Patient has this Ambulatory Care Manager's contact information for any further questions, concerns, or needs.

## 2020-01-15 NOTE — PATIENT INSTRUCTIONS
1) NPH: 60 units in the morning and 60 units at bedtime. 2) R: Take 20 units with each meal (only take R when you eat).     3) Correction:    150-199 2 units  200-249 4 units  250-299 6 units  300-349 8 units  350-399 10 units  400-449 12 units  450-499 14 units  500-549 16 units  550 + 18 units

## 2020-01-22 ENCOUNTER — PATIENT OUTREACH (OUTPATIENT)
Dept: ENDOCRINOLOGY | Age: 40
End: 2020-01-22

## 2020-01-22 NOTE — PROGRESS NOTES
Goals Addressed                 This Visit's Progress     Identification of barriers to adherence to a plan of care such as inability to afford medications, lack of insurance, lack of transportation, etc.        1/22/20  ACM and patient spoke in detail about patient's inability to work and he need for income, food and medications. ACM provided patient with application for social security association (SSA). Patient will complete application and return it to his local social security office in [facility]. ACM provided patient with resources for free or discount medications, community food sources and his local  office contact.  Patient verbalizes understanding of self -management goals of living with Diabetes. 1/15/20  Patient will test his blood sugar 4 times daily and review with ACM in next 1 week. 1/22/20  Patient states he has been testing his blood sugar 3-4 times daily. Patient will bring in his blood sugar on Friday, 1/24/20 for review with ACM.

## 2020-01-24 ENCOUNTER — TELEPHONE (OUTPATIENT)
Dept: FAMILY MEDICINE CLINIC | Age: 40
End: 2020-01-24

## 2020-01-24 NOTE — TELEPHONE ENCOUNTER
----- Message from Himanshu Rodas sent at 1/24/2020 12:30 PM EST -----  Regarding: Donah/telephone  Pts mother Julius Rothman is requesting an appointment for Monday for a cold. Pts number is [phone] and Mothers number is [phone].

## 2020-01-27 ENCOUNTER — OFFICE VISIT (OUTPATIENT)
Dept: FAMILY MEDICINE CLINIC | Age: 40
End: 2020-01-27

## 2020-01-27 VITALS
HEIGHT: 69 IN | SYSTOLIC BLOOD PRESSURE: 117 MMHG | OXYGEN SATURATION: 98 % | DIASTOLIC BLOOD PRESSURE: 67 MMHG | RESPIRATION RATE: 20 BRPM | BODY MASS INDEX: 38.06 KG/M2 | WEIGHT: 257 LBS | TEMPERATURE: 97.5 F | HEART RATE: 75 BPM

## 2020-01-27 DIAGNOSIS — I10 HYPERTENSION, WELL CONTROLLED: ICD-10-CM

## 2020-01-27 DIAGNOSIS — E11.65 TYPE 2 DIABETES MELLITUS WITH HYPERGLYCEMIA, WITHOUT LONG-TERM CURRENT USE OF INSULIN (HCC): Primary | ICD-10-CM

## 2020-01-27 DIAGNOSIS — E11.8 TYPE 2 DIABETES MELLITUS WITH COMPLICATION, WITH LONG-TERM CURRENT USE OF INSULIN (HCC): Primary | ICD-10-CM

## 2020-01-27 DIAGNOSIS — Z79.4 TYPE 2 DIABETES MELLITUS WITH COMPLICATION, WITH LONG-TERM CURRENT USE OF INSULIN (HCC): Primary | ICD-10-CM

## 2020-01-27 DIAGNOSIS — F41.8 DEPRESSION WITH ANXIETY: ICD-10-CM

## 2020-01-27 DIAGNOSIS — E03.9 ACQUIRED HYPOTHYROIDISM: ICD-10-CM

## 2020-01-27 DIAGNOSIS — J02.9 SORE THROAT: ICD-10-CM

## 2020-01-27 PROBLEM — E11.21 TYPE 2 DIABETES WITH NEPHROPATHY (HCC): Status: ACTIVE | Noted: 2020-01-27

## 2020-01-27 LAB
ERYTHROCYTE [DISTWIDTH] IN BLOOD BY AUTOMATED COUNT: 13.6 % (ref 11.6–15.4)
GLUCOSE POC: 382 MG/DL
HBA1C MFR BLD HPLC: 9.6 %
HCT VFR BLD AUTO: 38.5 % (ref 37.5–51)
HGB BLD-MCNC: 12.9 G/DL (ref 13–17.7)
MCH RBC QN AUTO: 28.1 PG (ref 26.6–33)
MCHC RBC AUTO-ENTMCNC: 33.5 G/DL (ref 31.5–35.7)
MCV RBC AUTO: 84 FL (ref 79–97)
PLATELET # BLD AUTO: 233 X10E3/UL (ref 150–450)
RBC # BLD AUTO: 4.59 X10E6/UL (ref 4.14–5.8)
S PYO AG THROAT QL: NEGATIVE
VALID INTERNAL CONTROL?: YES
WBC # BLD AUTO: 7.2 X10E3/UL (ref 3.4–10.8)

## 2020-01-27 RX ORDER — LORAZEPAM 0.5 MG/1
TABLET ORAL
Qty: 30 TAB | Refills: 0 | Status: SHIPPED | OUTPATIENT
Start: 2020-01-27 | End: 2020-03-24 | Stop reason: ALTCHOICE

## 2020-01-27 NOTE — PATIENT INSTRUCTIONS
A Healthy Lifestyle: Care Instructions  Your Care Instructions    A healthy lifestyle can help you feel good, stay at a healthy weight, and have plenty of energy for both work and play. A healthy lifestyle is something you can share with your whole family. A healthy lifestyle also can lower your risk for serious health problems, such as high blood pressure, heart disease, and diabetes. You can follow a few steps listed below to improve your health and the health of your family. Follow-up care is a key part of your treatment and safety. Be sure to make and go to all appointments, and call your doctor if you are having problems. It's also a good idea to know your test results and keep a list of the medicines you take. How can you care for yourself at home? · Do not eat too much sugar, fat, or fast foods. You can still have dessert and treats now and then. The goal is moderation. · Start small to improve your eating habits. Pay attention to portion sizes, drink less juice and soda pop, and eat more fruits and vegetables. ? Eat a healthy amount of food. A 3-ounce serving of meat, for example, is about the size of a deck of cards. Fill the rest of your plate with vegetables and whole grains. ? Limit the amount of soda and sports drinks you have every day. Drink more water when you are thirsty. ? Eat at least 5 servings of fruits and vegetables every day. It may seem like a lot, but it is not hard to reach this goal. A serving or helping is 1 piece of fruit, 1 cup of vegetables, or 2 cups of leafy, raw vegetables. Have an apple or some carrot sticks as an afternoon snack instead of a candy bar. Try to have fruits and/or vegetables at every meal.  · Make exercise part of your daily routine. You may want to start with simple activities, such as walking, bicycling, or slow swimming. Try to be active 30 to 60 minutes every day. You do not need to do all 30 to 60 minutes all at once.  For example, you can exercise 3 times a day for 10 or 20 minutes. Moderate exercise is safe for most people, but it is always a good idea to talk to your doctor before starting an exercise program.  · Keep moving. [place] [employer] the lawn, work in the garden, or [employer]. Take the stairs instead of the elevator at work. · If you smoke, quit. People who smoke have an increased risk for heart attack, stroke, cancer, and other lung illnesses. Quitting is hard, but there are ways to boost your chance of quitting tobacco for good. ? Use nicotine gum, patches, or lozenges. ? Ask your doctor about stop-smoking programs and medicines. ? Keep trying. In addition to reducing your risk of diseases in the future, you will notice some benefits soon after you stop using tobacco. If you have shortness of breath or asthma symptoms, they will likely get better within a few weeks after you quit. · Limit how much alcohol you drink. Moderate amounts of alcohol (up to 2 drinks a day for men, 1 drink a day for women) are okay. But drinking too much can lead to liver problems, high blood pressure, and other health problems. Family health  If you have a family, there are many things you can do together to improve your health. · Eat meals together as a family as often as possible. · Eat healthy foods. This includes fruits, vegetables, lean meats and dairy, and whole grains. · Include your family in your fitness plan. Most people think of activities such as jogging or tennis as the way to fitness, but there are many ways you and your family can be more active. Anything that makes you breathe hard and gets your heart pumping is exercise. Here are some tips:  ? Walk to do errands or to take your child to school or the bus.  ? Go for a family bike ride after dinner instead of watching TV. Where can you learn more? Go to http://ladarius-pavel.info/. Enter P513 in the search box to learn more about \"A Healthy Lifestyle: Care Instructions. \"  Current as of: May 28, 2019  Content Version: 12.2  © 2326-7632 [employer]. Care instructions adapted under license by [employer] (which disclaims liability or warranty for this information). If you have questions about a medical condition or this instruction, always ask your healthcare professional. [address] any warranty or liability for your use of this information. A Healthy Lifestyle: Care Instructions  Your Care Instructions    A healthy lifestyle can help you feel good, stay at a healthy weight, and have plenty of energy for both work and play. A healthy lifestyle is something you can share with your whole family. A healthy lifestyle also can lower your risk for serious health problems, such as high blood pressure, heart disease, and diabetes. You can follow a few steps listed below to improve your health and the health of your family. Follow-up care is a key part of your treatment and safety. Be sure to make and go to all appointments, and call your doctor if you are having problems. It's also a good idea to know your test results and keep a list of the medicines you take. How can you care for yourself at home? · Do not eat too much sugar, fat, or fast foods. You can still have dessert and treats now and then. The goal is moderation. · Start small to improve your eating habits. Pay attention to portion sizes, drink less juice and soda pop, and eat more fruits and vegetables. ? Eat a healthy amount of food. A 3-ounce serving of meat, for example, is about the size of a deck of cards. Fill the rest of your plate with vegetables and whole grains. ? Limit the amount of soda and sports drinks you have every day. Drink more water when you are thirsty. ? Eat at least 5 servings of fruits and vegetables every day.  It may seem like a lot, but it is not hard to reach this goal. A serving or helping is 1 piece of fruit, 1 cup of vegetables, or 2 cups of leafy, raw vegetables. Have an apple or some carrot sticks as an afternoon snack instead of a candy bar. Try to have fruits and/or vegetables at every meal.  · Make exercise part of your daily routine. You may want to start with simple activities, such as walking, bicycling, or slow swimming. Try to be active 30 to 60 minutes every day. You do not need to do all 30 to 60 minutes all at once. For example, you can exercise 3 times a day for 10 or 20 minutes. Moderate exercise is safe for most people, but it is always a good idea to talk to your doctor before starting an exercise program.  · Keep moving. [place] the lawn, work in the garden, or [employer]. Take the stairs instead of the elevator at work. · If you smoke, quit. People who smoke have an increased risk for heart attack, stroke, cancer, and other lung illnesses. Quitting is hard, but there are ways to boost your chance of quitting tobacco for good. ? Use nicotine gum, patches, or lozenges. ? Ask your doctor about stop-smoking programs and medicines. ? Keep trying. In addition to reducing your risk of diseases in the future, you will notice some benefits soon after you stop using tobacco. If you have shortness of breath or asthma symptoms, they will likely get better within a few weeks after you quit. · Limit how much alcohol you drink. Moderate amounts of alcohol (up to 2 drinks a day for men, 1 drink a day for women) are okay. But drinking too much can lead to liver problems, high blood pressure, and other health problems. Family health  If you have a family, there are many things you can do together to improve your health. · Eat meals together as a family as often as possible. · Eat healthy foods. This includes fruits, vegetables, lean meats and dairy, and whole grains. · Include your family in your fitness plan.  Most people think of activities such as jogging or tennis as the way to fitness, but there are many ways you and your family can be more active. Anything that makes you breathe hard and gets your heart pumping is exercise. Here are some tips:  ? Walk to do errands or to take your child to school or the bus.  ? Go for a family bike ride after dinner instead of watching TV. Where can you learn more? Go to http://ladarius-pavel.info/. Enter E014 in the search box to learn more about \"A Healthy Lifestyle: Care Instructions. \"  Current as of: May 28, 2019  Content Version: 12.2  © 4063-2311 [employer]. Care instructions adapted under license by [employer] (which disclaims liability or warranty for this information). If you have questions about a medical condition or this instruction, always ask your healthcare professional. [address] any warranty or liability for your use of this information.

## 2020-01-27 NOTE — PROGRESS NOTES
Chief Complaint   Patient presents with   [address] [place] [address] ED Follow-up     surgery on 2019    Blood sugar problem    Sore Throat     HPI:  Laisha Cox is a 35 y.o.  male with h/o poorly controlled diabetes, hypertension, hypercholesterolemia, hypothyroidism, migraine headaches, depression and anxiety presents accompanied by for ER follow up. Patient follows endocrinologist for diabetes and hypothyroidism. Patient was seen in ER with headaches and URI, was treated with antibiotics. Patient is complaining of sore throat follow treatment. Throat swab is negative. Note, he is non compliant with diabetic treatment and diet. I have advised diabetic education as relates to diet.  Blood pressure is at goal.    Review of Systems  As per hpi    Past Medical History:   Diagnosis Date    Anxiety     Chronic headaches 2007    Depression     Diabetes ([facility] [address]     GERD (gastroesophageal reflux disease)     Hypercholesterolemia     Hypertension     Thyroid disease     hypothyroid    Unspecified sleep apnea     does not use CPAP     Past Surgical History:   Procedure Laterality Date    HX CHOLECYSTECTOMY  14    Dr Jessica Donahue    HX HEENT      wisdom teeth removed    HX ORTHOPAEDIC Left 2012    carpel tunnel     Social History     Socioeconomic History    Marital status:      Spouse name: Not on file    Number of children: Not on file    Years of education: Not on file    Highest education level: Not on file   Tobacco Use    Smoking status: Former Smoker     Packs/day: 0.00     Years: 14.00     Pack years: 0.00     Last attempt to quit: 2014     Years since quittin.0    Smokeless tobacco: Never Used   Substance and Sexual Activity    Alcohol use: No    Drug use: No    Sexual activity: Not Currently     Family History   Problem Relation Age of Onset    Diabetes Mother     Heart Disease Father     Cancer Father     Diabetes Father     Diabetes Paternal Aunt     Diabetes Maternal Grandmother     Thyroid Cancer Maternal Grandmother     Kidney Disease Maternal Grandmother     Arthritis Maternal Grandmother     Heart Disease Maternal Grandfather     High Cholesterol Maternal Grandfather     Hypertension Maternal Grandfather     Diabetes Paternal Grandmother     Hemophilia Paternal Grandfather      Current Outpatient Medications   Medication Sig Dispense Refill    clopidogrel (PLAVIX) 75 mg tab TAKE 1 TABLET BY MOUTH ONCE DAILY      butalbital-acetaminophen-caffeine (FIORICET, ESGIC) -40 mg per tablet TAKE 1 TABLET BY MOUTH AT THE ONSET OF HEADACHE. CAN TAKE TWICE DAILY BUT NOT MORE THAN 10 DAYS/MONTH.ONLY TO BE FILLED IN 1 MONTH INTERVALS 20 Tab 0    ibuprofen (MOTRIN) 600 mg tablet Take 1 Tab by mouth every six (6) hours as needed for Pain. 20 Tab 0    nitroglycerin (NITROSTAT) 0.4 mg SL tablet Take 1 Tab by mouth every five (5) minutes as needed for Chest Pain. Sit down then put one tab under the tongue every 5 minutes as needed 1 Bottle 0    lisinopril (PRINIVIL, ZESTRIL) 10 mg tablet TAKE 1 TABLET BY MOUTH ONCE DAILY 90 Tab 3    rosuvastatin (CRESTOR) 20 mg tablet Take 1 Tab by mouth nightly. 90 Tab 3    metoprolol succinate (TOPROL-XL) 25 mg XL tablet Take 1 Tab by mouth daily. (Patient taking differently: Take 25 mg by mouth two (2) times a day.) 90 Tab 3    LORazepam (ATIVAN) 0.5 mg tablet TAKE 1 TABLET BY MOUTH ONCE DAILY AS NEEDED 30 Tab 0    cholecalciferol, VITAMIN D3, (VITAMIN D3) 5,000 unit tab tablet Take 1 Tab by mouth daily. 90 Tab 1    clomiPHENE (CLOMID) 50 mg tablet One pill every other day 15 Tab 3    lidocaine, PF, (XYLOCAINE) 10 mg/mL (1 %) injection Given in office 1 Vial 0    chlorthalidone (HYGROTEN) 25 mg tablet Take 1 Tab by mouth daily. 90 Tab 1    cyclobenzaprine (FLEXERIL) 10 mg tablet Take 10 mg by mouth three (3) times daily as needed for Muscle Spasm(s).  aspirin delayed-release 81 mg tablet Take 1 Tab by mouth daily.  30 Tab 0    busPIRone (BUSPAR) 15 mg tablet Take 1 Tab by mouth three (3) times daily. (Patient taking differently: Take 15 mg by mouth two (2) times a day.) 90 Tab 3    sertraline (ZOLOFT) 100 mg tablet Take 1 Tab by mouth daily.  insulin NPH (NOVOLIN N NPH U-100 INSULIN) 100 unit/mL injection by SubCUTAneous route once. 80 units twice a day      levothyroxine (SYNTHROID) 112 mcg tablet TAKE 1 TABLET BY MOUTH ONCE DAILY BEFORE BREAKFAST 90 Tab 3    insulin regular (NOVOLIN R, HUMULIN R) 100 unit/mL injection 15-20 units TID AC (Patient taking differently: 0-20 units 2-3 times per day  Indications: 0-20u BID, per pt) 1 Vial 12    ticagrelor (BRILINTA) 90 mg tablet Take 1 Tab by mouth every twelve (12) hours every twelve (12) hours.  60 Tab 12     Allergies   Allergen Reactions    Morphine Nausea and Vomiting    Penicillin G Swelling    Percocet [Oxycodone-Acetaminophen] Hives and Nausea and Vomiting    Sulfa (Sulfonamide Antibiotics) Swelling    Tramadol Nausea Only     Nausea and headache       Objective:  Visit Vitals  /67   Pulse 75   Temp 97.5 °F (36.4 °C)   Resp 20   Ht 5' 9\" (1.753 m)   Wt 257 lb (116.6 kg)   SpO2 98%   BMI 37.95 kg/m²     Physical Exam:   General appearance - obesealert, well appearing in no distress  Mental status - alert, oriented to person, place, and time  Chest - clear to auscultation, no wheezes, rales or rhonchi  Heart - normal rate, regular rhythm, normal blood pressure  Abdomen - soft, nontender, nondistended, no organomegaly  Ext-peripheral pulses normal, no pedal edema   Neuro -alert, oriented, normal speech, no focal findings   Back-full range of motion, no tenderness, palpable spasm or pain on motion     Results for orders placed or performed in visit on 01/15/20   AMB POC HEMOGLOBIN A1C   Result Value Ref Range    Hemoglobin A1c (POC) 9.5 %     Assessment/Plan:  Diagnoses and all orders for this visit:    Type 2 diabetes mellitus with complication, with long-term current use of insulin (HCC)  -     AMB POC HEMOGLOBIN A1C  -     AMB POC GLUCOSE BLOOD, BY GLUCOSE MONITORING DEVICE  -     REFERRAL TO DIETITIAN  -     URINALYSIS W/ RFLX MICROSCOPIC    Sore throat  -     AMB POC RAPID STREP A    Depression with anxiety  -     LORazepam (ATIVAN) 0.5 mg tablet; TAKE 1 TABLET BY MOUTH ONCE DAILY AS NEEDED, Print, Disp-30 Tab, R-0    Acquired hypothyroidism    Hypertension, well controlled      Patient Instructions        A Healthy Lifestyle: Care Instructions  Your Care Instructions    A healthy lifestyle can help you feel good, stay at a healthy weight, and have plenty of energy for both work and play. A healthy lifestyle is something you can share with your whole family. A healthy lifestyle also can lower your risk for serious health problems, such as high blood pressure, heart disease, and diabetes. You can follow a few steps listed below to improve your health and the health of your family. Follow-up care is a key part of your treatment and safety. Be sure to make and go to all appointments, and call your doctor if you are having problems. It's also a good idea to know your test results and keep a list of the medicines you take. How can you care for yourself at home? · Do not eat too much sugar, fat, or fast foods. You can still have dessert and treats now and then. The goal is moderation. · Start small to improve your eating habits. Pay attention to portion sizes, drink less juice and soda pop, and eat more fruits and vegetables. ? Eat a healthy amount of food. A 3-ounce serving of meat, for example, is about the size of a deck of cards. Fill the rest of your plate with vegetables and whole grains. ? Limit the amount of soda and sports drinks you have every day. Drink more water when you are thirsty. ? Eat at least 5 servings of fruits and vegetables every day.  It may seem like a lot, but it is not hard to reach this goal. A serving or helping is 1 piece of fruit, 1 cup of vegetables, or 2 cups of leafy, raw vegetables. Have an apple or some carrot sticks as an afternoon snack instead of a candy bar. Try to have fruits and/or vegetables at every meal.  · Make exercise part of your daily routine. You may want to start with simple activities, such as walking, bicycling, or slow swimming. Try to be active 30 to 60 minutes every day. You do not need to do all 30 to 60 minutes all at once. For example, you can exercise 3 times a day for 10 or 20 minutes. Moderate exercise is safe for most people, but it is always a good idea to talk to your doctor before starting an exercise program.  · Keep moving. [place] the lawn, work in the garden, or [employer]. Take the stairs instead of the elevator at work. · If you smoke, quit. People who smoke have an increased risk for heart attack, stroke, cancer, and other lung illnesses. Quitting is hard, but there are ways to boost your chance of quitting tobacco for good. ? Use nicotine gum, patches, or lozenges. ? Ask your doctor about stop-smoking programs and medicines. ? Keep trying. In addition to reducing your risk of diseases in the future, you will notice some benefits soon after you stop using tobacco. If you have shortness of breath or asthma symptoms, they will likely get better within a few weeks after you quit. · Limit how much alcohol you drink. Moderate amounts of alcohol (up to 2 drinks a day for men, 1 drink a day for women) are okay. But drinking too much can lead to liver problems, high blood pressure, and other health problems. Family health  If you have a family, there are many things you can do together to improve your health. · Eat meals together as a family as often as possible. · Eat healthy foods. This includes fruits, vegetables, lean meats and dairy, and whole grains. · Include your family in your fitness plan.  Most people think of activities such as jogging or tennis as the way to fitness, but there are many ways you and your family can be more active. Anything that makes you breathe hard and gets your heart pumping is exercise. Here are some tips:  ? Walk to do errands or to take your child to school or the bus.  ? Go for a family bike ride after dinner instead of watching TV. Where can you learn more? Go to http://ladarius-pavel.info/. Enter N252 in the search box to learn more about \"A Healthy Lifestyle: Care Instructions. \"  Current as of: May 28, 2019  Content Version: 12.2  © 0345-1748 [employer]. Care instructions adapted under license by [employer] (which disclaims liability or warranty for this information). If you have questions about a medical condition or this instruction, always ask your healthcare professional. [address] any warranty or liability for your use of this information. A Healthy Lifestyle: Care Instructions  Your Care Instructions    A healthy lifestyle can help you feel good, stay at a healthy weight, and have plenty of energy for both work and play. A healthy lifestyle is something you can share with your whole family. A healthy lifestyle also can lower your risk for serious health problems, such as high blood pressure, heart disease, and diabetes. You can follow a few steps listed below to improve your health and the health of your family. Follow-up care is a key part of your treatment and safety. Be sure to make and go to all appointments, and call your doctor if you are having problems. It's also a good idea to know your test results and keep a list of the medicines you take. How can you care for yourself at home? · Do not eat too much sugar, fat, or fast foods. You can still have dessert and treats now and then. The goal is moderation. · Start small to improve your eating habits. Pay attention to portion sizes, drink less juice and soda pop, and eat more fruits and vegetables. ? Eat a healthy amount of food.  A 3-ounce serving of meat, for example, is about the size of a deck of cards. Fill the rest of your plate with vegetables and whole grains. ? Limit the amount of soda and sports drinks you have every day. Drink more water when you are thirsty. ? Eat at least 5 servings of fruits and vegetables every day. It may seem like a lot, but it is not hard to reach this goal. A serving or helping is 1 piece of fruit, 1 cup of vegetables, or 2 cups of leafy, raw vegetables. Have an apple or some carrot sticks as an afternoon snack instead of a candy bar. Try to have fruits and/or vegetables at every meal.  · Make exercise part of your daily routine. You may want to start with simple activities, such as walking, bicycling, or slow swimming. Try to be active 30 to 60 minutes every day. You do not need to do all 30 to 60 minutes all at once. For example, you can exercise 3 times a day for 10 or 20 minutes. Moderate exercise is safe for most people, but it is always a good idea to talk to your doctor before starting an exercise program.  · Keep moving. [employer] the lawn, work in the garden, or [place]. Take the stairs instead of the elevator at work. · If you smoke, quit. People who smoke have an increased risk for heart attack, stroke, cancer, and other lung illnesses. Quitting is hard, but there are ways to boost your chance of quitting tobacco for good. ? Use nicotine gum, patches, or lozenges. ? Ask your doctor about stop-smoking programs and medicines. ? Keep trying. In addition to reducing your risk of diseases in the future, you will notice some benefits soon after you stop using tobacco. If you have shortness of breath or asthma symptoms, they will likely get better within a few weeks after you quit. · Limit how much alcohol you drink. Moderate amounts of alcohol (up to 2 drinks a day for men, 1 drink a day for women) are okay.  But drinking too much can lead to liver problems, high blood pressure, and other health problems. Family health  If you have a family, there are many things you can do together to improve your health. · Eat meals together as a family as often as possible. · Eat healthy foods. This includes fruits, vegetables, lean meats and dairy, and whole grains. · Include your family in your fitness plan. Most people think of activities such as jogging or tennis as the way to fitness, but there are many ways you and your family can be more active. Anything that makes you breathe hard and gets your heart pumping is exercise. Here are some tips:  ? Walk to do errands or to take your child to school or the bus.  ? Go for a family bike ride after dinner instead of watching TV. Where can you learn more? Go to http://ladarius-pavel.info/. Enter X100 in the search box to learn more about \"A Healthy Lifestyle: Care Instructions. \"  Current as of: May 28, 2019  Content Version: 12.2  © 4151-2401 [employer]. Care instructions adapted under license by [employer] (which disclaims liability or warranty for this information). If you have questions about a medical condition or this instruction, always ask your healthcare professional. [place] [address] any warranty or liability for your use of this information. Follow-up and Dispositions    · Return in about 3 months (around 4/27/2020), or if symptoms worsen or fail to improve, for routine follow up.

## 2020-01-28 LAB
APPEARANCE UR: CLEAR
BILIRUB UR QL STRIP: NEGATIVE
COLOR UR: YELLOW
GLUCOSE UR QL: ABNORMAL
HGB UR QL STRIP: NEGATIVE
KETONES UR QL STRIP: NEGATIVE
LEUKOCYTE ESTERASE UR QL STRIP: NEGATIVE
MICRO URNS: ABNORMAL
NITRITE UR QL STRIP: NEGATIVE
PH UR STRIP: 7 [PH] (ref 5–7.5)
PROT UR QL STRIP: ABNORMAL
SP GR UR: >=1.03 (ref 1–1.03)
UROBILINOGEN UR STRIP-MCNC: 1 MG/DL (ref 0.2–1)

## 2020-01-29 ENCOUNTER — PATIENT OUTREACH (OUTPATIENT)
Dept: ENDOCRINOLOGY | Age: 40
End: 2020-01-29

## 2020-01-30 NOTE — PROGRESS NOTES
Goals Addressed                 This Visit's Progress     Identification of barriers to adherence to a plan of care such as inability to afford medications, lack of insurance, lack of transportation, etc.        1/22/20  ACM and patient spoke in detail about patient's inability to work and he need for income, food and medications. ACM provided patient with application for social security association (SSA). Patient will complete application and return it to his local social security office in [facility]. ACM provided patient with resources for free or discount medications, community food sources and his local  office contact. 1/29/20  Spoke to patient today. Patient states his insurance coverage will be ending in the next 2 days, 1/31/20. Patient states he is concerned about his insulin medications. ACM provided patient with contact information for [employer] program and [employer] program to help with follow up visit and medications. Patient will contact [employer] program and [employer] today. ACM will contact patient in next 2 days to help complete patient assistance program (PAP) application.  Patient verbalizes understanding of self -management goals of living with Diabetes. 1/15/20  Patient will test his blood sugar 4 times daily and review with ACM in next 1 week. 1/22/20  Patient states he has been testing his blood sugar 3-4 times daily. Patient will bring in his blood sugar on Friday, 1/24/20 for review with ACM. 1/29/20  Patient states he is testing his blood sugar daily, but states he does adjust his insulin on what his blood sugar is according to corrective scale. Patient is taking Novolin N and Novolin R for diabetes management.
Mother

## 2020-02-05 ENCOUNTER — PATIENT OUTREACH (OUTPATIENT)
Dept: ENDOCRINOLOGY | Age: 40
End: 2020-02-05

## 2020-02-05 NOTE — PROGRESS NOTES
Goals Addressed                 This Visit's Progress     Identification of barriers to adherence to a plan of care such as inability to afford medications, lack of insurance, lack of transportation, etc.        1/22/20  ACM and patient spoke in detail about patient's inability to work and he need for income, food and medications. ACM provided patient with application for social security association (SSA). Patient will complete application and return it to his local social security office in [facility]. ACM provided patient with resources for free or discount medications, community food sources and his local  office contact. 1/29/20  Spoke to patient today. Patient states his insurance coverage will be ending in the next 2 days, 1/31/20. Patient states he is concerned about his insulin medications. ACM provided patient with contact information for [employer] program and [employer] program to help with follow up visit and medications. Patient will contact [employer] program and [employer] today. ACM will contact patient in next 2 days to help complete patient assistance program (PAP) application. 2/5/20  Spoke to patient today, patient states he has contacted the [employer] office and is submitting his financial verification documents. ACM will follow up with patient in next 2 weeks.  Patient verbalizes understanding of self -management goals of living with Diabetes. 1/15/20  Patient will test his blood sugar 4 times daily and review with ACM in next 1 week. 1/22/20  Patient states he has been testing his blood sugar 3-4 times daily. Patient will bring in his blood sugar on Friday, 1/24/20 for review with ACM. 1/29/20  Patient states he is testing his blood sugar daily, but states he does adjust his insulin on what his blood sugar is according to corrective scale. Patient is taking Novolin N and Novolin R for diabetes management. 2/5/20  Patient states having his insulin at home. Patient will contact ACM if cost of insulin is more than he can afford. ACM will assist with patient assistance and resources for obtaining lost cost insulin. Patient will continue to test his blood sugar for the next 2 weeks 3-4 times daily and review with ACM at next contact.

## 2020-02-14 ENCOUNTER — HOSPITAL ENCOUNTER (EMERGENCY)
Age: 40
Discharge: HOME | End: 2020-02-14
Attending: EMERGENCY MEDICINE
Payer: COMMERCIAL

## 2020-02-14 ENCOUNTER — APPOINTMENT (OUTPATIENT)
Dept: GENERAL RADIOLOGY | Age: 40
End: 2020-02-14
Attending: EMERGENCY MEDICINE
Payer: COMMERCIAL

## 2020-02-14 ENCOUNTER — TELEPHONE (OUTPATIENT)
Dept: ENDOCRINOLOGY | Age: 40
End: 2020-02-14

## 2020-02-14 VITALS
DIASTOLIC BLOOD PRESSURE: 70 MMHG | HEART RATE: 70 BPM | TEMPERATURE: 98 F | RESPIRATION RATE: 16 BRPM | SYSTOLIC BLOOD PRESSURE: 130 MMHG | BODY MASS INDEX: 37.65 KG/M2 | WEIGHT: 254.19 LBS | HEIGHT: 69 IN | OXYGEN SATURATION: 98 %

## 2020-02-14 DIAGNOSIS — R07.89 ATYPICAL CHEST PAIN: Primary | ICD-10-CM

## 2020-02-14 LAB
ALBUMIN SERPL-MCNC: 3.4 G/DL (ref 3.5–5)
ALBUMIN/GLOB SERPL: 0.9 {RATIO} (ref 1.1–2.2)
ALP SERPL-CCNC: 79 U/L (ref 45–117)
ALT SERPL-CCNC: 54 U/L (ref 12–78)
ANION GAP SERPL CALC-SCNC: 3 MMOL/L (ref 5–15)
AST SERPL-CCNC: 22 U/L (ref 15–37)
ATRIAL RATE: 73 BPM
BASOPHILS # BLD: 0 K/UL (ref 0–0.1)
BASOPHILS NFR BLD: 1 % (ref 0–1)
BILIRUB SERPL-MCNC: 0.4 MG/DL (ref 0.2–1)
BUN SERPL-MCNC: 14 MG/DL (ref 6–20)
BUN/CREAT SERPL: 11 (ref 12–20)
CALCIUM SERPL-MCNC: 8.8 MG/DL (ref 8.5–10.1)
CALCULATED P AXIS, ECG09: 39 DEGREES
CALCULATED R AXIS, ECG10: 12 DEGREES
CALCULATED T AXIS, ECG11: 23 DEGREES
CHLORIDE SERPL-SCNC: 102 MMOL/L (ref 97–108)
CK SERPL-CCNC: 86 U/L (ref 39–308)
CO2 SERPL-SCNC: 30 MMOL/L (ref 21–32)
CREAT SERPL-MCNC: 1.31 MG/DL (ref 0.7–1.3)
DIAGNOSIS, 93000: NORMAL
DIFFERENTIAL METHOD BLD: ABNORMAL
EOSINOPHIL # BLD: 0.2 K/UL (ref 0–0.4)
EOSINOPHIL NFR BLD: 4 % (ref 0–7)
ERYTHROCYTE [DISTWIDTH] IN BLOOD BY AUTOMATED COUNT: 13.3 % (ref 11.5–14.5)
GLOBULIN SER CALC-MCNC: 3.8 G/DL (ref 2–4)
GLUCOSE SERPL-MCNC: 355 MG/DL (ref 65–100)
HCT VFR BLD AUTO: 39.3 % (ref 36.6–50.3)
HGB BLD-MCNC: 13 G/DL (ref 12.1–17)
IMM GRANULOCYTES # BLD AUTO: 0.1 K/UL (ref 0–0.04)
IMM GRANULOCYTES NFR BLD AUTO: 1 % (ref 0–0.5)
LIPASE SERPL-CCNC: 65 U/L (ref 73–393)
LYMPHOCYTES # BLD: 1.8 K/UL (ref 0.8–3.5)
LYMPHOCYTES NFR BLD: 31 % (ref 12–49)
MCH RBC QN AUTO: 28 PG (ref 26–34)
MCHC RBC AUTO-ENTMCNC: 33.1 G/DL (ref 30–36.5)
MCV RBC AUTO: 84.7 FL (ref 80–99)
MONOCYTES # BLD: 0.5 K/UL (ref 0–1)
MONOCYTES NFR BLD: 8 % (ref 5–13)
NEUTS SEG # BLD: 3.2 K/UL (ref 1.8–8)
NEUTS SEG NFR BLD: 55 % (ref 32–75)
NRBC # BLD: 0 K/UL (ref 0–0.01)
NRBC BLD-RTO: 0 PER 100 WBC
P-R INTERVAL, ECG05: 158 MS
PLATELET # BLD AUTO: 162 K/UL (ref 150–400)
PMV BLD AUTO: 10.2 FL (ref 8.9–12.9)
POTASSIUM SERPL-SCNC: 4.5 MMOL/L (ref 3.5–5.1)
PROT SERPL-MCNC: 7.2 G/DL (ref 6.4–8.2)
Q-T INTERVAL, ECG07: 374 MS
QRS DURATION, ECG06: 92 MS
QTC CALCULATION (BEZET), ECG08: 412 MS
RBC # BLD AUTO: 4.64 M/UL (ref 4.1–5.7)
SODIUM SERPL-SCNC: 135 MMOL/L (ref 136–145)
TROPONIN I BLD-MCNC: <0.04 NG/ML (ref 0–0.08)
TROPONIN I SERPL-MCNC: <0.05 NG/ML
VENTRICULAR RATE, ECG03: 73 BPM
WBC # BLD AUTO: 5.8 K/UL (ref 4.1–11.1)

## 2020-02-14 PROCEDURE — 96374 THER/PROPH/DIAG INJ IV PUSH: CPT

## 2020-02-14 PROCEDURE — 36415 COLL VENOUS BLD VENIPUNCTURE: CPT

## 2020-02-14 PROCEDURE — 80053 COMPREHEN METABOLIC PANEL: CPT

## 2020-02-14 PROCEDURE — 84484 ASSAY OF TROPONIN QUANT: CPT

## 2020-02-14 PROCEDURE — 74011000250 HC RX REV CODE- 250: Performed by: EMERGENCY MEDICINE

## 2020-02-14 PROCEDURE — 83690 ASSAY OF LIPASE: CPT

## 2020-02-14 PROCEDURE — 85025 COMPLETE CBC W/AUTO DIFF WBC: CPT

## 2020-02-14 PROCEDURE — 71045 X-RAY EXAM CHEST 1 VIEW: CPT

## 2020-02-14 PROCEDURE — 74011250636 HC RX REV CODE- 250/636: Performed by: EMERGENCY MEDICINE

## 2020-02-14 PROCEDURE — 99285 EMERGENCY DEPT VISIT HI MDM: CPT

## 2020-02-14 PROCEDURE — 82550 ASSAY OF CK (CPK): CPT

## 2020-02-14 PROCEDURE — 74011250637 HC RX REV CODE- 250/637: Performed by: EMERGENCY MEDICINE

## 2020-02-14 PROCEDURE — 93005 ELECTROCARDIOGRAM TRACING: CPT

## 2020-02-14 RX ORDER — ACETAMINOPHEN 500 MG
1000 TABLET ORAL
Status: COMPLETED | OUTPATIENT
Start: 2020-02-14 | End: 2020-02-14

## 2020-02-14 RX ORDER — PHENOL/SODIUM PHENOLATE
20 AEROSOL, SPRAY (ML) MUCOUS MEMBRANE DAILY
Qty: 20 TAB | Refills: 0 | Status: SHIPPED | OUTPATIENT
Start: 2020-02-14 | End: 2022-01-14 | Stop reason: ALTCHOICE

## 2020-02-14 RX ORDER — GUAIFENESIN 100 MG/5ML
324 LIQUID (ML) ORAL
Status: COMPLETED | OUTPATIENT
Start: 2020-02-14 | End: 2020-02-14

## 2020-02-14 RX ADMIN — LIDOCAINE HYDROCHLORIDE 40 ML: 20 SOLUTION ORAL; TOPICAL at 13:10

## 2020-02-14 RX ADMIN — ASPIRIN 81 MG 324 MG: 81 TABLET ORAL at 13:10

## 2020-02-14 RX ADMIN — FAMOTIDINE 20 MG: 10 INJECTION, SOLUTION INTRAVENOUS at 13:10

## 2020-02-14 RX ADMIN — ACETAMINOPHEN 1000 MG: 500 TABLET ORAL at 14:52

## 2020-02-14 NOTE — ED PROVIDER NOTES
EMERGENCY DEPARTMENT HISTORY AND PHYSICAL EXAM      Date: 2/14/2020  Patient Name: Miguelina Ugarte    Please note that this dictation was completed with [employer], the computer voice recognition software. Quite often unanticipated grammatical, syntax, homophones, and other interpretive errors are inadvertently transcribed by the computer software. Please disregard these errors. Please excuse any errors that have escaped final proofreading. History of Presenting Illness     Chief Complaint   Patient presents with    Hypertension     The patient presents to the ED with complaints of high blood pressure that started  yesterday and chest pain that started three days ago.  Chest Pain       History Provided By: Patient    HPI: Miguelina Ugarte, 35 y.o. male, with a past medical history significant for coronary artery disease presenting the emergency department complaining of chest pain. Describes intermittent episodes of sharp stabbing pain in his chest and epigastrium. No associated shortness of breath, no diaphoresis, no nausea or vomiting. Not similar to his prior cardiac episode. Denies any fevers or chills. No cough. No unilateral leg pain or leg swelling. PCP: Fabi Rivas MD    No current facility-administered medications on file prior to encounter. Current Outpatient Medications on File Prior to Encounter   Medication Sig Dispense Refill    LORazepam (ATIVAN) 0.5 mg tablet TAKE 1 TABLET BY MOUTH ONCE DAILY AS NEEDED 30 Tab 0    clopidogrel (PLAVIX) 75 mg tab TAKE 1 TABLET BY MOUTH ONCE DAILY      butalbital-acetaminophen-caffeine (FIORICET, ESGIC) -40 mg per tablet TAKE 1 TABLET BY MOUTH AT THE ONSET OF HEADACHE. CAN TAKE TWICE DAILY BUT NOT MORE THAN 10 DAYS/MONTH.ONLY TO BE FILLED IN 1 MONTH INTERVALS 20 Tab 0    ibuprofen (MOTRIN) 600 mg tablet Take 1 Tab by mouth every six (6) hours as needed for Pain.  20 Tab 0    nitroglycerin (NITROSTAT) 0.4 mg SL tablet Take 1 Tab by mouth every five (5) minutes as needed for Chest Pain. Sit down then put one tab under the tongue every 5 minutes as needed 1 Bottle 0    lisinopril (PRINIVIL, ZESTRIL) 10 mg tablet TAKE 1 TABLET BY MOUTH ONCE DAILY 90 Tab 3    rosuvastatin (CRESTOR) 20 mg tablet Take 1 Tab by mouth nightly. 90 Tab 3    metoprolol succinate (TOPROL-XL) 25 mg XL tablet Take 1 Tab by mouth daily. (Patient taking differently: Take 25 mg by mouth two (2) times a day.) 90 Tab 3    cholecalciferol, VITAMIN D3, (VITAMIN D3) 5,000 unit tab tablet Take 1 Tab by mouth daily. 90 Tab 1    clomiPHENE (CLOMID) 50 mg tablet One pill every other day 15 Tab 3    lidocaine, PF, (XYLOCAINE) 10 mg/mL (1 %) injection Given in office 1 Vial 0    chlorthalidone (HYGROTEN) 25 mg tablet Take 1 Tab by mouth daily. 90 Tab 1    cyclobenzaprine (FLEXERIL) 10 mg tablet Take 10 mg by mouth three (3) times daily as needed for Muscle Spasm(s).  aspirin delayed-release 81 mg tablet Take 1 Tab by mouth daily. 30 Tab 0    busPIRone (BUSPAR) 15 mg tablet Take 1 Tab by mouth three (3) times daily. (Patient taking differently: Take 15 mg by mouth two (2) times a day.) 90 Tab 3    sertraline (ZOLOFT) 100 mg tablet Take 1 Tab by mouth daily.  insulin NPH (NOVOLIN N NPH U-100 INSULIN) 100 unit/mL injection by SubCUTAneous route once.  80 units twice a day      levothyroxine (SYNTHROID) 112 mcg tablet TAKE 1 TABLET BY MOUTH ONCE DAILY BEFORE BREAKFAST 90 Tab 3    insulin regular (NOVOLIN R, HUMULIN R) 100 unit/mL injection 15-20 units TID AC (Patient taking differently: 0-20 units 2-3 times per day  Indications: 0-20u BID, per pt) 1 Vial 12       Past History     Past Medical History:  Past Medical History:   Diagnosis Date    Anxiety     Chronic headaches 2007    Depression     Diabetes ([address]     GERD (gastroesophageal reflux disease)     Hypercholesterolemia     Hypertension     Thyroid disease     hypothyroid    Unspecified sleep apnea     does not use CPAP       Past Surgical History:  Past Surgical History:   Procedure Laterality Date    HX CHOLECYSTECTOMY  14    Dr Renetta Johns    HX HEENT      wisdom teeth removed    HX ORTHOPAEDIC Left 2012    carpel tunnel       Family History:  Family History   Problem Relation Age of Onset    Diabetes Mother     Heart Disease Father     Cancer Father     Diabetes Father     Diabetes Paternal Aunt     Diabetes Maternal Grandmother     Thyroid Cancer Maternal Grandmother     Kidney Disease Maternal Grandmother     Arthritis Maternal Grandmother     Heart Disease Maternal Grandfather     High Cholesterol Maternal Grandfather     Hypertension Maternal Grandfather     Diabetes Paternal Grandmother     Hemophilia Paternal Grandfather        Social History:  Social History     Tobacco Use    Smoking status: Former Smoker     Packs/day: 0.00     Years: 14.00     Pack years: 0.00     Last attempt to quit: 2014     Years since quittin.1    Smokeless tobacco: Never Used   Substance Use Topics    Alcohol use: No    Drug use: No       Allergies: Allergies   Allergen Reactions    Morphine Nausea and Vomiting    Penicillin G Swelling    Percocet [Oxycodone-Acetaminophen] Hives and Nausea and Vomiting    Sulfa (Sulfonamide Antibiotics) Swelling    Tramadol Nausea Only     Nausea and headache           Review of Systems   Review of Systems   Constitutional: Negative for chills and fever. HENT: Negative for congestion and sore throat. Eyes: Negative for visual disturbance. Respiratory: Negative for cough and shortness of breath. Cardiovascular: Positive for chest pain. Negative for leg swelling. Gastrointestinal: Positive for abdominal pain. Negative for blood in stool, diarrhea, nausea and vomiting. Endocrine: Negative for polyuria. Genitourinary: Negative for dysuria and testicular pain. Musculoskeletal: Negative for arthralgias, joint swelling and myalgias.    Skin: Negative for rash.   Allergic/Immunologic: Negative for immunocompromised state. Neurological: Negative for weakness and headaches. Hematological: Does not bruise/bleed easily. Psychiatric/Behavioral: Negative for confusion. Physical Exam   Physical Exam  Vitals signs and nursing note reviewed. Constitutional:       Appearance: He is well-developed. HENT:      Head: Normocephalic and atraumatic. Eyes:      General:         Right eye: No discharge. Left eye: No discharge. Conjunctiva/sclera: Conjunctivae normal.      Pupils: Pupils are equal, round, and reactive to light. Neck:      Musculoskeletal: Normal range of motion and neck supple. Trachea: No tracheal deviation. Cardiovascular:      Rate and Rhythm: Normal rate and regular rhythm. Heart sounds: Normal heart sounds. No murmur. Pulmonary:      Effort: Pulmonary effort is normal. No respiratory distress. Breath sounds: Normal breath sounds. No wheezing or rales. Abdominal:      General: Bowel sounds are normal.      Palpations: Abdomen is soft. Tenderness: There is no abdominal tenderness. There is no guarding or rebound. Musculoskeletal: Normal range of motion. General: No tenderness or deformity. Skin:     General: Skin is warm and dry. Findings: No erythema or rash. Neurological:      Mental Status: He is alert and oriented to person, place, and time. Psychiatric:         Behavior: Behavior normal.         Diagnostic Study Results     Labs -     No results found for this or any previous visit (from the past 12 hour(s)). Radiologic Studies -   XR CHEST PORT   Final Result   Impression: No acute process. CT Results  (Last 48 hours)    None        CXR Results  (Last 48 hours)               02/14/20 1147  XR CHEST PORT Final result    Impression:  Impression: No acute process. Narrative:   Indication: Chest pain       Comparison: 1/8/2020       Portable exam of the chest obtained at 1118 demonstrates normal heart size. There is no acute process in the lung fields. The osseous structures are   unremarkable. Medical Decision Making   I am the first provider for this patient. I reviewed the vital signs, available nursing notes, past medical history, past surgical history, family history and social history. Vital Signs-Reviewed the patient's vital signs. No data found. EKG interpretation: (Preliminary)  EKG shows sinus rhythm, rate 73. Normal axis. Normal intervals. No evidence of acute ischemic ST or T wave changes. Interpreted by me    Records Reviewed: Nursing Notes and Old Medical Records    Provider Notes (Medical Decision Making):   Patient presents with CP. DDx:  ACS, Aortic dissection, PNA, PE, PTX, pericarditis, myocarditis, GERD, costochondritis, anxiety. Most likely noncardiac, likely GI given the HPI and Physical exam. Will obtain labs, CXR, EKG. - I have re-examined the patient and the patient denies chest pain on re-examination. The patient has had onset of chest pain greater than 8 hours and one negative set of cardiac enzymes or 2 negative sets of cardiac enzymes in the ER during this visit. The diagnosis, follow up, return instructions, test results, x-rays and medications have been discussed and reviewed with the patient. The patient has been given the opportunity to ask questions. The patient  expresses understanding of the diagnosis, follow-up and return instructions. The patient agrees to follow up with primary care or cardiology as directed and to return immediately if the chest pain worsens. The patient expresses understanding that although cardiac testing at this time is negative, a cardiac problem could still be present and that a follow-up appointment for further evaluation and risk factor modification is necessary to complete the evaluation of this complaint. ED Course:   Initial assessment performed.  The patients presenting problems have been discussed, and they are in agreement with the care plan formulated and outlined with them. I have encouraged them to ask questions as they arise throughout their visit. Critical Care Time:   none    Disposition:  DISCHARGE NOTE  Patients results have been reviewed with them. Patient and/or family have verbally conveyed their understanding and agreement of the patient's signs, symptoms, diagnosis, treatment and prognosis and additionally agree to follow up as recommended or return to the Emergency Room should their condition change or have any new concerns prior to their follow-up appointment. Patient verbally agrees with the care-plan and verbally conveys that all of their questions have been answered. Discharge instructions have also been provided to the patient with some educational information regarding their diagnosis as well a list of reasons why they would want to return to the ER prior to their follow-up appointment should their condition change. PLAN:  1. Discharge Medication List as of 2/14/2020  2:43 PM        2. Follow-up Information     Follow up With Specialties Details Why Contact Info    Viviana Christine MD Internal Medicine Schedule an appointment as soon as possible for a visit  [address] [facility]  [address]  [address] [address]  [address] [facility]  Schedule an appointment as soon as possible for a visit  [address] [facility] [phone]    [facility] EMERGENCY DEPT Emergency Medicine  If symptoms worsen [address] [facility] [address]   [address] [place]    Terry Bailey MD Cardiology In 4 days  [address]  [address]  [address] [phone]            Return to ED if worse     Diagnosis     Clinical Impression:   1. Atypical chest pain        Attestations:   This note was completed by Cathi Cuello DO

## 2020-02-14 NOTE — DISCHARGE INSTRUCTIONS

## 2020-02-14 NOTE — TELEPHONE ENCOUNTER
Reviewed BG logs. I called pt and recommended he continue the NPH 70 units AM/HS, and increase in Novolin R to 20 units with each meal plus 2:50 correction scale. Pt voices understanding and agreement with the plan.

## 2020-02-20 ENCOUNTER — PATIENT OUTREACH (OUTPATIENT)
Dept: ENDOCRINOLOGY | Age: 40
End: 2020-02-20

## 2020-02-20 NOTE — PROGRESS NOTES
Goals Addressed                 This Visit's Progress     Identification of barriers to adherence to a plan of care such as inability to afford medications, lack of insurance, lack of transportation, etc.        1/22/20  ACM and patient spoke in detail about patient's inability to work and he need for income, food and medications. ACM provided patient with application for social security association (SSA). Patient will complete application and return it to his local social security office in [facility]. ACM provided patient with resources for free or discount medications, community food sources and his local  office contact. 1/29/20  Spoke to patient today. Patient states his insurance coverage will be ending in the next 2 days, 1/31/20. Patient states he is concerned about his insulin medications. ACM provided patient with contact information for [employer] program and [employer] program to help with follow up visit and medications. Patient will contact [employer] program and [employer] today. ACM will contact patient in next 2 days to help complete patient assistance program (PAP) application. 2/5/20  Spoke to patient today, patient states he has contacted the [employer] office and is submitting his financial verification documents. ACM will follow up with patient in next 2 weeks. 2/20/20  Spoke to patient. ACM and patient completed the [employer] application. Patient will make a copy of application bring original application to Vasyl at [facility] today. ACM will contact patient in 2-3 weeks.

## 2020-03-11 ENCOUNTER — HOSPITAL ENCOUNTER (EMERGENCY)
Age: 40
Discharge: HOME | End: 2020-03-12
Attending: EMERGENCY MEDICINE
Payer: SUBSIDIZED

## 2020-03-11 DIAGNOSIS — R07.9 CHEST PAIN, UNSPECIFIED TYPE: Primary | ICD-10-CM

## 2020-03-11 LAB
ALBUMIN SERPL-MCNC: 3.4 G/DL (ref 3.5–5)
ALBUMIN/GLOB SERPL: 0.9 {RATIO} (ref 1.1–2.2)
ALP SERPL-CCNC: 71 U/L (ref 45–117)
ALT SERPL-CCNC: 39 U/L (ref 12–78)
ANION GAP SERPL CALC-SCNC: 6 MMOL/L (ref 5–15)
AST SERPL-CCNC: 34 U/L (ref 15–37)
BASOPHILS # BLD: 0.1 K/UL (ref 0–0.1)
BASOPHILS NFR BLD: 1 % (ref 0–1)
BILIRUB SERPL-MCNC: 0.4 MG/DL (ref 0.2–1)
BUN SERPL-MCNC: 13 MG/DL (ref 6–20)
BUN/CREAT SERPL: 11 (ref 12–20)
CALCIUM SERPL-MCNC: 8.5 MG/DL (ref 8.5–10.1)
CHLORIDE SERPL-SCNC: 103 MMOL/L (ref 97–108)
CO2 SERPL-SCNC: 25 MMOL/L (ref 21–32)
CREAT SERPL-MCNC: 1.2 MG/DL (ref 0.7–1.3)
DIFFERENTIAL METHOD BLD: ABNORMAL
EOSINOPHIL # BLD: 0.3 K/UL (ref 0–0.4)
EOSINOPHIL NFR BLD: 4 % (ref 0–7)
ERYTHROCYTE [DISTWIDTH] IN BLOOD BY AUTOMATED COUNT: 13.6 % (ref 11.5–14.5)
GLOBULIN SER CALC-MCNC: 3.9 G/DL (ref 2–4)
GLUCOSE SERPL-MCNC: 351 MG/DL (ref 65–100)
HCT VFR BLD AUTO: 39.7 % (ref 36.6–50.3)
HGB BLD-MCNC: 13.3 G/DL (ref 12.1–17)
IMM GRANULOCYTES # BLD AUTO: 0.1 K/UL (ref 0–0.04)
IMM GRANULOCYTES NFR BLD AUTO: 1 % (ref 0–0.5)
LYMPHOCYTES # BLD: 2.4 K/UL (ref 0.8–3.5)
LYMPHOCYTES NFR BLD: 30 % (ref 12–49)
MCH RBC QN AUTO: 27.5 PG (ref 26–34)
MCHC RBC AUTO-ENTMCNC: 33.5 G/DL (ref 30–36.5)
MCV RBC AUTO: 82 FL (ref 80–99)
MONOCYTES # BLD: 0.6 K/UL (ref 0–1)
MONOCYTES NFR BLD: 7 % (ref 5–13)
NEUTS SEG # BLD: 4.6 K/UL (ref 1.8–8)
NEUTS SEG NFR BLD: 57 % (ref 32–75)
NRBC # BLD: 0 K/UL (ref 0–0.01)
NRBC BLD-RTO: 0 PER 100 WBC
PLATELET # BLD AUTO: 173 K/UL (ref 150–400)
PMV BLD AUTO: 10.8 FL (ref 8.9–12.9)
POTASSIUM SERPL-SCNC: 4.6 MMOL/L (ref 3.5–5.1)
PROT SERPL-MCNC: 7.3 G/DL (ref 6.4–8.2)
RBC # BLD AUTO: 4.84 M/UL (ref 4.1–5.7)
SODIUM SERPL-SCNC: 134 MMOL/L (ref 136–145)
TROPONIN I SERPL-MCNC: <0.05 NG/ML
TROPONIN I SERPL-MCNC: <0.05 NG/ML
WBC # BLD AUTO: 7.9 K/UL (ref 4.1–11.1)

## 2020-03-11 PROCEDURE — 36415 COLL VENOUS BLD VENIPUNCTURE: CPT

## 2020-03-11 PROCEDURE — 74011000250 HC RX REV CODE- 250: Performed by: EMERGENCY MEDICINE

## 2020-03-11 PROCEDURE — 74011250637 HC RX REV CODE- 250/637: Performed by: EMERGENCY MEDICINE

## 2020-03-11 PROCEDURE — 84484 ASSAY OF TROPONIN QUANT: CPT

## 2020-03-11 PROCEDURE — 99285 EMERGENCY DEPT VISIT HI MDM: CPT

## 2020-03-11 PROCEDURE — 85025 COMPLETE CBC W/AUTO DIFF WBC: CPT

## 2020-03-11 PROCEDURE — 80053 COMPREHEN METABOLIC PANEL: CPT

## 2020-03-11 PROCEDURE — 93005 ELECTROCARDIOGRAM TRACING: CPT

## 2020-03-11 RX ORDER — NITROGLYCERIN 0.4 MG/1
0.4 TABLET SUBLINGUAL
Status: COMPLETED | OUTPATIENT
Start: 2020-03-11 | End: 2020-03-11

## 2020-03-11 RX ORDER — GUAIFENESIN 100 MG/5ML
162 LIQUID (ML) ORAL
Status: COMPLETED | OUTPATIENT
Start: 2020-03-11 | End: 2020-03-11

## 2020-03-11 RX ADMIN — LIDOCAINE HYDROCHLORIDE 40 ML: 20 SOLUTION ORAL; TOPICAL at 21:17

## 2020-03-11 RX ADMIN — ASPIRIN 81 MG 162 MG: 81 TABLET ORAL at 21:17

## 2020-03-11 RX ADMIN — NITROGLYCERIN 0.4 MG: 0.4 TABLET, ORALLY DISINTEGRATING SUBLINGUAL at 21:17

## 2020-03-12 VITALS
DIASTOLIC BLOOD PRESSURE: 68 MMHG | SYSTOLIC BLOOD PRESSURE: 114 MMHG | HEIGHT: 69 IN | RESPIRATION RATE: 18 BRPM | HEART RATE: 68 BPM | BODY MASS INDEX: 37.62 KG/M2 | WEIGHT: 253.97 LBS | OXYGEN SATURATION: 95 % | TEMPERATURE: 97.9 F

## 2020-03-12 LAB
ATRIAL RATE: 66 BPM
ATRIAL RATE: 73 BPM
CALCULATED P AXIS, ECG09: 37 DEGREES
CALCULATED P AXIS, ECG09: 39 DEGREES
CALCULATED R AXIS, ECG10: 13 DEGREES
CALCULATED R AXIS, ECG10: 18 DEGREES
CALCULATED T AXIS, ECG11: 13 DEGREES
CALCULATED T AXIS, ECG11: 15 DEGREES
DIAGNOSIS, 93000: NORMAL
DIAGNOSIS, 93000: NORMAL
P-R INTERVAL, ECG05: 158 MS
P-R INTERVAL, ECG05: 160 MS
Q-T INTERVAL, ECG07: 366 MS
Q-T INTERVAL, ECG07: 380 MS
QRS DURATION, ECG06: 96 MS
QRS DURATION, ECG06: 98 MS
QTC CALCULATION (BEZET), ECG08: 398 MS
QTC CALCULATION (BEZET), ECG08: 403 MS
VENTRICULAR RATE, ECG03: 66 BPM
VENTRICULAR RATE, ECG03: 73 BPM

## 2020-03-12 RX ORDER — CYCLOBENZAPRINE HCL 10 MG
10 TABLET ORAL
Qty: 12 TAB | Refills: 0 | OUTPATIENT
Start: 2020-03-12 | End: 2020-12-21

## 2020-03-12 RX ORDER — SUCRALFATE 1 G/10ML
1 SUSPENSION ORAL 4 TIMES DAILY
Qty: 414 ML | Refills: 0 | Status: SHIPPED | OUTPATIENT
Start: 2020-03-12 | End: 2020-05-22

## 2020-03-12 NOTE — ED NOTES
Oral MENENDEZ reviewed discharge instructions with the patient. The patient verbalized understanding. All questions and concerns were addressed. The patient is discharged ambulatory with instructions and prescriptions in hand. Pt is alert and oriented x 4. Respirations are clear and unlabored.

## 2020-03-12 NOTE — DISCHARGE INSTRUCTIONS

## 2020-03-15 NOTE — ED PROVIDER NOTES
EMERGENCY DEPARTMENT HISTORY AND PHYSICAL EXAM      Date: 3/11/2020  Patient Name: Francisca Mullins    History of Presenting Illness     Chief Complaint   Patient presents with    Epigastric Pain     Pt ambulatory to triage with c/o \"gas-like\" pain since yesterday \"my chest has been bothering me real bad, not sure if its indigestion or not\"; pain is non-radiating; no N/V       History Provided By: Patient    HPI: Francisca Mullins, 38 y.o. male with PMHx as noted below presents the emergency department with chief complaint of chest pain. Patient reports constant left-sided chest pain since yesterday. He describes it as a dull aching sensation that does not radiate. Patient also reports experiencing worsening indigestion over the last day. Patient did take his daily aspirin today and is compliant with his other medications. Pt denies any other alleviating or exacerbating factors. Additionally, pt specifically denies any recent fever, chills, headache, nausea, vomiting, , SOB, lightheadedness, dizziness, numbness, weakness, BLE swelling, heart palpitations, urinary sxs, diarrhea, constipation, melena, hematochezia, cough, or congestion. PCP: Trae Law MD    Current Outpatient Medications   Medication Sig Dispense Refill    sucralfate (Carafate) 100 mg/mL suspension Take 5 mL by mouth four (4) times daily. 414 mL 0    cyclobenzaprine (FLEXERIL) 10 mg tablet Take 1 Tab by mouth three (3) times daily as needed for Muscle Spasm(s). 12 Tab 0    Omeprazole delayed release (PRILOSEC D/R) 20 mg tablet Take 1 Tab by mouth daily. 20 Tab 0    LORazepam (ATIVAN) 0.5 mg tablet TAKE 1 TABLET BY MOUTH ONCE DAILY AS NEEDED 30 Tab 0    clopidogrel (PLAVIX) 75 mg tab TAKE 1 TABLET BY MOUTH ONCE DAILY      butalbital-acetaminophen-caffeine (FIORICET, ESGIC) -40 mg per tablet TAKE 1 TABLET BY MOUTH AT THE ONSET OF HEADACHE. CAN TAKE TWICE DAILY BUT NOT MORE THAN 10 DAYS/MONTH.ONLY TO BE FILLED IN 1 MONTH INTERVALS 20 Tab 0    ibuprofen (MOTRIN) 600 mg tablet Take 1 Tab by mouth every six (6) hours as needed for Pain. 20 Tab 0    nitroglycerin (NITROSTAT) 0.4 mg SL tablet Take 1 Tab by mouth every five (5) minutes as needed for Chest Pain. Sit down then put one tab under the tongue every 5 minutes as needed 1 Bottle 0    lisinopril (PRINIVIL, ZESTRIL) 10 mg tablet TAKE 1 TABLET BY MOUTH ONCE DAILY 90 Tab 3    rosuvastatin (CRESTOR) 20 mg tablet Take 1 Tab by mouth nightly. 90 Tab 3    metoprolol succinate (TOPROL-XL) 25 mg XL tablet Take 1 Tab by mouth daily. (Patient taking differently: Take 25 mg by mouth two (2) times a day.) 90 Tab 3    cholecalciferol, VITAMIN D3, (VITAMIN D3) 5,000 unit tab tablet Take 1 Tab by mouth daily. 90 Tab 1    clomiPHENE (CLOMID) 50 mg tablet One pill every other day 15 Tab 3    lidocaine, PF, (XYLOCAINE) 10 mg/mL (1 %) injection Given in office 1 Vial 0    chlorthalidone (HYGROTEN) 25 mg tablet Take 1 Tab by mouth daily. 90 Tab 1    aspirin delayed-release 81 mg tablet Take 1 Tab by mouth daily. 30 Tab 0    busPIRone (BUSPAR) 15 mg tablet Take 1 Tab by mouth three (3) times daily. (Patient taking differently: Take 15 mg by mouth two (2) times a day.) 90 Tab 3    sertraline (ZOLOFT) 100 mg tablet Take 1 Tab by mouth daily.  insulin NPH (NOVOLIN N NPH U-100 INSULIN) 100 unit/mL injection by SubCUTAneous route once.  80 units twice a day      levothyroxine (SYNTHROID) 112 mcg tablet TAKE 1 TABLET BY MOUTH ONCE DAILY BEFORE BREAKFAST 90 Tab 3    insulin regular (NOVOLIN R, HUMULIN R) 100 unit/mL injection 15-20 units TID AC (Patient taking differently: 0-20 units 2-3 times per day  Indications: 0-20u BID, per pt) 1 Vial 12       Past History     Past Medical History:  Past Medical History:   Diagnosis Date    Anxiety     Chronic headaches 2007    Depression     Diabetes ([address]     GERD (gastroesophageal reflux disease)     Hypercholesterolemia     Hypertension     Thyroid disease     hypothyroid    Unspecified sleep apnea     does not use CPAP       Past Surgical History:  Past Surgical History:   Procedure Laterality Date    HX CHOLECYSTECTOMY  14    Dr Christian Landers    HX HEENT      wisdom teeth removed    HX ORTHOPAEDIC Left 2012    carpel tunnel       Family History:  Family History   Problem Relation Age of Onset    Diabetes Mother     Heart Disease Father     Cancer Father     Diabetes Father     Diabetes Paternal Aunt     Diabetes Maternal Grandmother     Thyroid Cancer Maternal Grandmother     Kidney Disease Maternal Grandmother     Arthritis Maternal Grandmother     Heart Disease Maternal Grandfather     High Cholesterol Maternal Grandfather     Hypertension Maternal Grandfather     Diabetes Paternal Grandmother     Hemophilia Paternal Grandfather        Social History:  Social History     Tobacco Use    Smoking status: Former Smoker     Packs/day: 0.00     Years: 14.00     Pack years: 0.00     Last attempt to quit: 2014     Years since quittin.2    Smokeless tobacco: Never Used   Substance Use Topics    Alcohol use: No    Drug use: No       Allergies: Allergies   Allergen Reactions    Morphine Nausea and Vomiting    Penicillin G Swelling    Percocet [Oxycodone-Acetaminophen] Hives and Nausea and Vomiting    Sulfa (Sulfonamide Antibiotics) Swelling    Tramadol Nausea Only     Nausea and headache           Review of Systems   Review of Systems  Constitutional: Negative for fever, chills, and fatigue. HENT: Negative for congestion, sore throat, rhinorrhea, sneezing and neck stiffness   Eyes: Negative for discharge and redness. Respiratory: Negative for  shortness of breath, wheezing   Cardiovascular: Positive for chest pain. Negative palpitations   Gastrointestinal: Negative for nausea, vomiting, constipation, diarrhea and blood in stool.    Genitourinary: Negative for dysuria, hematuria, flank pain, decreased urine volume, discharge,   Musculoskeletal: Negative for myalgias or joint pain . Skin: Negative for rash or lesions . Neurological: Negative weakness, light-headedness, numbness and headaches. Physical Exam   Physical Exam    GENERAL: alert and oriented, no acute distress  EYES: PEERL, No injection, discharge or icterus. ENT: Mucous membranes pink and moist.  NECK: Supple  LUNGS: Airway patent. Non-labored respirations. Breath sounds clear with good air entry bilaterally. HEART: Regular rate and rhythm. No peripheral edema  ABDOMEN: Non-distended and non-tender, without guarding or rebound. SKIN:  warm, dry  MSK/EXTREMITIES: Without swelling, tenderness or deformity, symmetric with normal ROM  NEUROLOGICAL: Alert, oriented      Diagnostic Study Results     Labs -  Reviewed    Radiologic Studies -   No orders to display     CT Results  (Last 48 hours)    None        CXR Results  (Last 48 hours)    None            Medical Decision Making   I am the first provider for this patient. I reviewed the vital signs, available nursing notes, past medical history, past surgical history, family history and social history. Vital Signs-Reviewed the patient's vital signs. EKG interpretation: (Preliminary)  Rhythm: normal sinus rhythm; and regular . Rate (approx.): 73; Axis: normal; P wave: normal; QRS interval: normal ; ST/T wave: Nonspecific abnormality. No significant changes when compared to previous;  was interpreted by Zuri Segura MD,ED Provider. Records Reviewed: Nursing Notes and Old Medical Records    Provider Notes (Medical Decision Making): On presentation the patient is well appearing, in no acute distress with reassurnig vital signs. Based on the history and exam the differential diagnosis for this patient includes PE, dissection, ACS, MSK chest pain, pleurisy, costochondritis, bronchitis, GERD, esophageal spasm.      The pt is unlikely to have PE or aortic dissection given his description of symptoms and resolution while in the department. Do not feel that further testing for these diagnoses is indicated. I have re-examined the patient and the patient denies any new or changing symptoms. Has remained pain-free. The patient has had 2 negative troponin at this time and 2 EKG's without any signs of acute ischemia. The diagnosis, follow up, return instructions, test esults, x-rays and medications have been discussed and reviewed with the patient. Patient notified of his elevated blood glucose and the need for stricter glycemic control as left uncontrolled long-term could result in worsening heart disease, kidney failure, blindness, severe infection. He expressed understanding and need for follow-up. The patient has been given the opportunity to ask questions. The patient expresses understanding of the diagnosis, follow-up and return instructions. The patient agrees to follow up with cardiology as directed and to return immediately if the chest pain worsens. The patient expresses understanding that although cardiac testing at this time is negative, a cardiac problem could still be present and that a follow-up appointment with a cardiologist for further evaluation and risk factor modification is necessary to complete the evaluation of this complaint. Jose Linton MD.        ED Course:   Initial assessment performed. The patients presenting problems have been discussed, and they are in agreement with the care plan formulated and outlined with them. I have encouraged them to ask questions as they arise throughout their visit. PROGRESS NOTE:  The patient has been re-evaluated and is ready for discharge. Reviewed available results with patient and have counseled them on diagnosis and care plan. They have expressed understanding, and all their questions have been answered. They agree with plan and agree to have pt F/U as recommended, or return to the ED if their sxs worsen. Discharge instructions have been provided and explained to them, along with reasons to have pt return to the ED. The patient is amenable to discharge so will discharge pt at this time      Disposition:  home    PLAN:  1. Discharge Medication List as of 3/12/2020 12:23 AM      START taking these medications    Details   sucralfate (Carafate) 100 mg/mL suspension Take 5 mL by mouth four (4) times daily. , Normal, Disp-414 mL, R-0         CONTINUE these medications which have CHANGED    Details   cyclobenzaprine (FLEXERIL) 10 mg tablet Take 1 Tab by mouth three (3) times daily as needed for Muscle Spasm(s). , Normal, Disp-12 Tab, R-0         CONTINUE these medications which have NOT CHANGED    Details   Omeprazole delayed release (PRILOSEC D/R) 20 mg tablet Take 1 Tab by mouth daily. , Normal, Disp-20 Tab, R-0      LORazepam (ATIVAN) 0.5 mg tablet TAKE 1 TABLET BY MOUTH ONCE DAILY AS NEEDED, Print, Disp-30 Tab, R-0      clopidogrel (PLAVIX) 75 mg tab TAKE 1 TABLET BY MOUTH ONCE DAILY, Historical Med      butalbital-acetaminophen-caffeine (FIORICET, ESGIC) -40 mg per tablet TAKE 1 TABLET BY MOUTH AT THE ONSET OF HEADACHE. CAN TAKE TWICE DAILY BUT NOT MORE THAN 10 DAYS/MONTH.ONLY TO BE FILLED IN 1 MONTH INTERVALS, Normal, Disp-20 Tab, R-0      ibuprofen (MOTRIN) 600 mg tablet Take 1 Tab by mouth every six (6) hours as needed for Pain., Normal, Disp-20 Tab, R-0      nitroglycerin (NITROSTAT) 0.4 mg SL tablet Take 1 Tab by mouth every five (5) minutes as needed for Chest Pain. Sit down then put one tab under the tongue every 5 minutes as needed, Normal, Disp-1 Bottle, R-0      lisinopril (PRINIVIL, ZESTRIL) 10 mg tablet TAKE 1 TABLET BY MOUTH ONCE DAILY, Print, Disp-90 Tab, R-3      rosuvastatin (CRESTOR) 20 mg tablet Take 1 Tab by mouth nightly. , Print, Disp-90 Tab, R-3      metoprolol succinate (TOPROL-XL) 25 mg XL tablet Take 1 Tab by mouth daily. , Print, Disp-90 Tab, R-3      cholecalciferol, VITAMIN D3, (VITAMIN D3) 5,000 unit tab tablet Take 1 Tab by mouth daily. , Print, Disp-90 Tab, R-1      clomiPHENE (CLOMID) 50 mg tablet One pill every other day, Normal, Disp-15 Tab, R-3      lidocaine, PF, (XYLOCAINE) 10 mg/mL (1 %) injection Given in office, Sample, Disp-1 Vial, R-0      chlorthalidone (HYGROTEN) 25 mg tablet Take 1 Tab by mouth daily. , Normal, Disp-90 Tab, R-1      aspirin delayed-release 81 mg tablet Take 1 Tab by mouth daily. , Print, Disp-30 Tab, R-0      busPIRone (BUSPAR) 15 mg tablet Take 1 Tab by mouth three (3) times daily. , Normal, Disp-90 Tab, R-3      sertraline (ZOLOFT) 100 mg tablet Take 1 Tab by mouth daily. , Historical MedPlease consider 90 day supplies to promote better adherence      insulin NPH (NOVOLIN N NPH U-100 INSULIN) 100 unit/mL injection by SubCUTAneous route once. 80 units twice a day, Historical Med      levothyroxine (SYNTHROID) 112 mcg tablet TAKE 1 TABLET BY MOUTH ONCE DAILY BEFORE BREAKFAST, Normal, Disp-90 Tab, R-3      insulin regular (NOVOLIN R, HUMULIN R) 100 unit/mL injection 15-20 units TID AC, No Print, Disp-1 Vial, R-12           2. Follow-up Information     Follow up With Specialties Details Why Contact Info    Severo Carol, MD Internal Medicine Schedule an appointment as soon as possible for a visit in 2 days  [address] [facility]  [address]      Sinai Hartman MD Cardiology Schedule an appointment as soon as possible for a visit in 2 days  [phone]          Return to ED if worse     Diagnosis     Clinical Impression:   1. Chest pain, unspecified type      2. Hyperglycemia    Please note that this dictation was completed with Dragon, computer voice recognition software. Quite often unanticipated grammatical, syntax, homophones, and other interpretive errors are inadvertently transcribed by the computer software. Please disregard these errors.   Additionally, please excuse any errors that have escaped final proofreading.

## 2020-03-24 ENCOUNTER — OFFICE VISIT (OUTPATIENT)
Dept: BEHAVIORAL/MENTAL HEALTH CLINIC | Age: 40
End: 2020-03-24

## 2020-03-24 VITALS — TEMPERATURE: 97.6 F

## 2020-03-24 DIAGNOSIS — F41.0 ANXIETY DISORDER DUE TO GENERAL MEDICAL CONDITION WITH PANIC ATTACK: Primary | ICD-10-CM

## 2020-03-24 DIAGNOSIS — F06.4 ANXIETY DISORDER DUE TO GENERAL MEDICAL CONDITION WITH PANIC ATTACK: Primary | ICD-10-CM

## 2020-03-24 DIAGNOSIS — F41.9 ANXIETY: ICD-10-CM

## 2020-03-24 DIAGNOSIS — G47.00 INSOMNIA DISORDER WITH NON-SLEEP DISORDER MENTAL COMORBIDITY: ICD-10-CM

## 2020-03-24 DIAGNOSIS — F43.29 ADJUSTMENT DISORDER WITH MIXED EMOTIONAL FEATURES: ICD-10-CM

## 2020-03-24 DIAGNOSIS — F34.1 DYSTHYMIA: ICD-10-CM

## 2020-03-24 RX ORDER — SERTRALINE HYDROCHLORIDE 100 MG/1
100 TABLET, FILM COATED ORAL DAILY
Qty: 30 TAB | Refills: 3 | Status: SHIPPED | OUTPATIENT
Start: 2020-03-24 | End: 2020-05-08

## 2020-03-24 RX ORDER — LORAZEPAM 1 MG/1
1 TABLET ORAL
Qty: 30 TAB | Refills: 3 | Status: SHIPPED | OUTPATIENT
Start: 2020-03-24 | End: 2020-05-08

## 2020-03-24 RX ORDER — TRAZODONE HYDROCHLORIDE 50 MG/1
50 TABLET ORAL
Qty: 30 TAB | Refills: 3 | Status: SHIPPED | OUTPATIENT
Start: 2020-03-24 | End: 2020-05-22

## 2020-03-24 RX ORDER — BUSPIRONE HYDROCHLORIDE 15 MG/1
15 TABLET ORAL 2 TIMES DAILY
Qty: 60 TAB | Refills: 3 | Status: SHIPPED | OUTPATIENT
Start: 2020-03-24 | End: 2020-05-08

## 2020-03-24 NOTE — PROGRESS NOTES
INITIAL PSYCHIATRIC EVALUATION    IDENTIFICATION:      A. Name: Rehan Mae Age:     38 y.o.      C.   MRN: [id number]       D.   CSN:      [id number]      E. Admission Date: (Not on file)       REMBERTO   :     1980          SOURCE OF INFORMATION: The patient, past records          CHIEF COMPLAINT:  'I have been down, anxious, and short tempered\"    Current symptoms: down, hit wall,door,racing thoughts, argumentative,irritable, tense, anxious, insomnia,tired, multiple panic attacks, insomnia ( trouble falling asleep), crowds like at [place]  Duration of symptoms: 1 year, following cardiac problems and food poisoining     PHQ-9 scores:3/27  HAM-A scores:  Mood Disorder Questionaire scores:    HPI: Darreld Boy is a 35 y.o.   male  poorly controlled diabetes,CAD ( with ten stents placed), hypertension, hypercholesterolemia, hypothyroidism,hypogonadism, migraine headaches, depression/anxiety that presented to establish care at this clinic. He has been struggling with insomnia, anxiety with panic attacks, and depressive symptoms since a bout of food poisoning and his coronary artery blockages found last year. He feels that his temper has been bad for years because of the death of his grandparents, then his wife's infidelity,divorce, and not being able to have the boys. After which his medical problems ensued. He has been trying to live but unable to forget the panic attacks he developed  Year ago. He has not been the same, he has trouble falling and maitaining sleep, sleep apnea has been entertained but he never went to obtain a CPAP machine. He reports having resorted to alcohol at one point for one year, smoked until he quit many years ago when his son asked him why he smoked. He has never had any major psychiatric problems prior to the broken marriage and his health problems.  He was depressed and sad over the grandparent but never sought any interventions despite becoming defiant, aggressive and oppositional.   He has never attempted suicide nor ever admitted for a psychiatric disorder. He has never been arrested nor charged with any criminal activity. He has had a very supportive family. STRESSORS and or Precipitating factors:  Heart problems, father's behavior ( since his CNS hge)    PERSONAL COPING STYLEs :  Walk away, escape, take lorazepam, Used to punch walls and things in the past           REVIEW OF  PSYCHIATRIC SYSTEMS:  Patient did not meet criteria for a Major Depressive Disorder ( W OR W/O  psychotic features) PTSD, Schizophrenia, Bipolar Affective Disorder, Obsessive Compulsive Disorder, Anxiety Disorder, Agoraphobia, EATING DISORDER,SUBSTANCE USE DISORDER,  Psychotic disorders or ASD. PAST PSYCHIATRIC HISTORY:   Outpatient Psychiatric treatments:marriage counselor in the past  Suicide attempts/self inflictive behaviors:none  Hospitalizations:  none  Medications Tried:Duloxetine, Inderal, Hydroxyzine, Ambien,    The current ones : Zoloft 100mg, Ativan 0.5mg daily,and Buspar 15mg tid    ECT,TMS or Ketamine infusion:   none  Legal/Assault History:  none    PAST SUBSTANCE ABUSE HISTORY:  Began drinking for one year after losing his job as an  ( ), Worked for [employer] for over 12 years  No drinks in past 2-4 weeks. Smoked cigs for 10-12 years,stopped. Drank 10-12 mountain dews/day,    FAMILY PSYCH HISTORY: parents both suffer from anxiety       SOCIAL HISTORY: he is the only child born to an intact marriage,depressed after his grandmother(Alli)  And then his grandfather  in . 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 Worked for [employer] work for over 12 years, then laid off. She  his HS tl after she returned from college, took care of her son and their son,  after two years of marriage.  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 PAST MEDICAL/SURGICAL HISTORY:   IDDM,CAD, multivessel disease, requiring multiple stents, which were placed by Dr. Bozena Caal of Cardiology, vertigo, and dizziness he is followed by Dr. Sheldon Sotelo of Neurology.   Hypothyroidism, Hypogonadism,  Current Outpatient Medications   Medication Sig Dispense Refill    cyclobenzaprine (FLEXERIL) 10 mg tablet Take 1 Tab by mouth three (3) times daily as needed for Muscle Spasm(s). 12 Tab 0    Omeprazole delayed release (PRILOSEC D/R) 20 mg tablet Take 1 Tab by mouth daily. 20 Tab 0    clopidogrel (PLAVIX) 75 mg tab TAKE 1 TABLET BY MOUTH ONCE DAILY      butalbital-acetaminophen-caffeine (FIORICET, ESGIC) -40 mg per tablet TAKE 1 TABLET BY MOUTH AT THE ONSET OF HEADACHE. CAN TAKE TWICE DAILY BUT NOT MORE THAN 10 DAYS/MONTH.ONLY TO BE FILLED IN 1 MONTH INTERVALS 20 Tab 0    ibuprofen (MOTRIN) 600 mg tablet Take 1 Tab by mouth every six (6) hours as needed for Pain. 20 Tab 0    nitroglycerin (NITROSTAT) 0.4 mg SL tablet Take 1 Tab by mouth every five (5) minutes as needed for Chest Pain. Sit down then put one tab under the tongue every 5 minutes as needed 1 Bottle 0    lisinopril (PRINIVIL, ZESTRIL) 10 mg tablet TAKE 1 TABLET BY MOUTH ONCE DAILY 90 Tab 3    rosuvastatin (CRESTOR) 20 mg tablet Take 1 Tab by mouth nightly. 90 Tab 3    metoprolol succinate (TOPROL-XL) 25 mg XL tablet Take 1 Tab by mouth daily. (Patient taking differently: Take 25 mg by mouth two (2) times a day.) 90 Tab 3    cholecalciferol, VITAMIN D3, (VITAMIN D3) 5,000 unit tab tablet Take 1 Tab by mouth daily. 90 Tab 1    clomiPHENE (CLOMID) 50 mg tablet One pill every other day 15 Tab 3    aspirin delayed-release 81 mg tablet Take 1 Tab by mouth daily.  30 Tab 0    levothyroxine (SYNTHROID) 112 mcg tablet TAKE 1 TABLET BY MOUTH ONCE DAILY BEFORE BREAKFAST 90 Tab 3    insulin regular (NOVOLIN R, HUMULIN R) 100 unit/mL injection 15-20 units TID AC (Patient taking differently: 0-20 units 2-3 times per day  Indications: 0-20u BID, per pt) 1 Vial 12    busPIRone (BUSPAR) 15 mg tablet Take 1 Tab by mouth two (2) times a day. 60 Tab 3    sertraline (ZOLOFT) 100 mg tablet Take 1 Tab by mouth daily. 30 Tab 3    LORazepam (ATIVAN) 1 mg tablet Take 1 Tab by mouth every four (4) hours as needed for Anxiety. Max Daily Amount: 6 mg. 30 Tab 3    traZODone (DESYREL) 50 mg tablet Take 1 Tab by mouth nightly. 30 Tab 3    sucralfate (Carafate) 100 mg/mL suspension Take 5 mL by mouth four (4) times daily. 414 mL 0    lidocaine, PF, (XYLOCAINE) 10 mg/mL (1 %) injection Given in office 1 Vial 0    chlorthalidone (HYGROTEN) 25 mg tablet Take 1 Tab by mouth daily. 90 Tab 1    insulin NPH (NOVOLIN N NPH U-100 INSULIN) 100 unit/mL injection by SubCUTAneous route once. 80 units twice a day           Medical review of systems mainly considered within normal limits expect as noted in history above. Pertinent LABS:unremarkable      ALLERGIES:   He is allergic to morphine; penicillin g; percocet [oxycodone-acetaminophen]; sulfa (sulfonamide antibiotics); and tramadol. MENTAL STATUS EXAM:  Orientation person, place, time/date, situation, day of week, month of year and year    Behavior/Eye contact: Good eye contact   Appearance:  Well kempt,appearing his/her age   Motor Behavior:  within normal limits   Speech:  normal pitch, normal volume and non-pressured   Thought Process: goal directed and logical   Thought Content free of delusions and obsessions   Suicidal ideations no plan  and no intention   Homicidal ideations no plan  and no intention   Mood:  anxious and sad   Affect:  full range and sad   Memory recent  adequate   Memory remote:  adequate   Concentration:  adequate   Abstraction:  abstract   Insight:  good   Reliability good   Perceptual disortions  Absent( auditory,visual,olfactory,tactile), patient denied         ASSESSMENT:  The patient is a 38 y.o.  male with symptoms  Suggestive of an adjusttment disorder with mixed emotional features in a prolonged grieving man(death of grandparents, divorce from wife and numerous medical problems, including hypogonadism). He remains anxious and does not sleep well . Suicide/Homicide risk : (Nil,Low, Moderate, High): NIL    STRENGTHS:   WEAKNESSES:    PROVISIONAL DIAGNOSES:    ICD-10-CM ICD-9-CM   1. Anxiety disorder due to general medical condition with panic attack F06.4 293.84    F41.0 300.01   2. Adjustment disorder with mixed emotional features F43.29 309.9   3. Dysthymia F34.1 300.4   4. Insomnia disorder with non-sleep disorder mental comorbidity G47.00 780.52   5. Anxiety F41.9 300.00       Orders Placed This Encounter    busPIRone (BUSPAR) 15 mg tablet     Sig: Take 1 Tab by mouth two (2) times a day. Dispense:  60 Tab     Refill:  3    sertraline (ZOLOFT) 100 mg tablet     Sig: Take 1 Tab by mouth daily. Dispense:  30 Tab     Refill:  3     Please consider 90 day supplies to promote better adherence    LORazepam (ATIVAN) 1 mg tablet     Sig: Take 1 Tab by mouth every four (4) hours as needed for Anxiety. Max Daily Amount: 6 mg. Dispense:  30 Tab     Refill:  3    traZODone (DESYREL) 50 mg tablet     Sig: Take 1 Tab by mouth nightly. Dispense:  30 Tab     Refill:  3       TREATMENT PLAN:       1. Medications:               Discussed adding Trazadone to the regimen for insomnia and increasing dose of Ativan to 1mg as needed to control his anxiety and agitation. He agreed. No other changes for now. The risks and benefits of the proposed medications; the potential medication side effects and consequences of non-compliance were dicussed. The  patient was given opportunity to ask questions             2. Counseling/ psychotherapy:  Psych-education provided: impact of anxiety and insomnia on DM,weight, and CAD.    Discussed rational versus irrational thinking patterns and their consequences. Discussed healthy/adaptive and unhealthy/maladaptive coping. 3.  Labs/ tests/ old records/ collateral: NA    4. Follow up : 6 weeks. Referrals/Consults: will refer for IT once more stable          Mr. Rishabh Gómez has a reminder for a \"due or due soon\" health maintenance. I have asked that he contact his primary care provider for follow-up on this health maintenance. TIME SPENT FACE TO FACE: 60,71,15,56, 60 MINUTES  minute visit spent counseling regarding her extensive symptoms, reviewing previous records and coordinating care. SIGNED:    Daina Armstrong. Hector Gomez MD,   Adult Psychiatrist/Psychosomatic Medicine  3/24/2020         * If you feel suicidal after hours, please call the [address] [place] [address] @ [phone] OR GO TO THE NEAREST [address]. YOU MAY ALSO ACCESS THE SUICIDE HOTLINE @ \"SPEAKING OF SUICIDE. COM/RESOURCES\"

## 2020-04-15 DIAGNOSIS — E10.9 TYPE 1 DIABETES MELLITUS WITHOUT COMPLICATION (HCC): ICD-10-CM

## 2020-04-15 DIAGNOSIS — E29.1 HYPOGONADISM IN MALE: ICD-10-CM

## 2020-04-15 DIAGNOSIS — E03.9 ACQUIRED HYPOTHYROIDISM: ICD-10-CM

## 2020-04-21 ENCOUNTER — VIRTUAL VISIT (OUTPATIENT)
Dept: ENDOCRINOLOGY | Age: 40
End: 2020-04-21

## 2020-04-21 DIAGNOSIS — E29.1 HYPOGONADISM IN MALE: ICD-10-CM

## 2020-04-21 DIAGNOSIS — E10.9 TYPE 1 DIABETES MELLITUS WITHOUT COMPLICATION (HCC): Primary | ICD-10-CM

## 2020-04-21 DIAGNOSIS — E03.9 ACQUIRED HYPOTHYROIDISM: ICD-10-CM

## 2020-04-21 RX ORDER — METFORMIN HYDROCHLORIDE 500 MG/1
TABLET ORAL
COMMUNITY
Start: 2020-02-27 | End: 2020-08-31

## 2020-04-21 RX ORDER — ALUMINA, MAGNESIA, AND SIMETHICONE 2400; 2400; 240 MG/30ML; MG/30ML; MG/30ML
10 SUSPENSION ORAL
COMMUNITY
End: 2022-09-14

## 2020-04-21 NOTE — PROGRESS NOTES
No chief complaint on file. **THIS IS A VIRTUAL VISIT VIA A VIDEO ENCOUNTER. PATIENT AGREED TO HAVE THEIR CARE DELIVERED OVER VIDEO IN PLACE OF THEIR REGULARLY SCHEDULED OFFICE VISIT**      History of Present Illness: Maya Her is a 38 y.o. male here for follow up of diabetes. 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 Timothy Jean of Cardiology. He was also in the ED in January 2020 with issues of N/V. He was tested for influenza and it was negative. For his issues of vertigo, and dizziness he is followed by Dr. Leonardo Camargo of Neurology. Since our last visit in January we have been adjusting his insulin regimen based on his BG logs. My most recent recommendation was NPH 70 units AM/HS, and increase in Novolin R to 20 units with each meal plus 2:50 correction scale. He was in the ED with high BP and CP in February 2020 and \"epigastric pain\" in March 2020. He was diagnosed with GERD and started on Omeprazole. He has also been taking Malox PRN. He is now following with Dr. Elza Oconnell of psychiatry for anxiety and depression. Pt is checking his BGs 3 times per day. His BGs have been running \"high\". Pt notes he has not been having any issues of low BGs and is not skipping any doses of his Novolin R. He notes that sometimes during the middle of the day it can be in the (mid-200-300's range). She has been taking 20+ units of the Novolin R to \"couinteract what I am eating plus low BGs. He is taking NPH 70 units in the AM and 70 units HS    Pt is eating 2-3 meals per day. He wakes around 8AM-Noon. He takes his morning NPH 70. He will wake up, eat and then take his Novolin N and Novolin R  His breakfast today was eggos and a ham and cheese sandwich and diet soda. He will have lunch around 3PM, yesterday he did not have lunch.  He only takes his Novolin R if he eats  He has dinner around 8-11PM, last night he had ham and pancakes and diet soda. Pt has hx of CAD s/p stenting in December 2019. Pt has hx of neuropathy. No hx of nephropathy. Last eye exam was February 2020. No retinopathy. Pt has hx of hypothyroidism, since the age of 11-9 years old. He is currently taking 112mcg daily. He was biochemically euthyroid in October 2019. At our visit in October 2019 we tested his Thyroid labs, which were normal, but he had a very low Testosterone of 199 on 10/22/19 and repeat was 240 in 10/28/19. His FSH, LH and prolactin were unremarkable. Pt opted to start not Clomid 50mg every other day. At our last visit we again ordered the clomid. Pt notes that he has been taking the Clomid 50mg every other day. He notes he is still having issues of ED and poor libido. Current Outpatient Medications   Medication Sig    busPIRone (BUSPAR) 15 mg tablet Take 1 Tab by mouth two (2) times a day.  sertraline (ZOLOFT) 100 mg tablet Take 1 Tab by mouth daily.  LORazepam (ATIVAN) 1 mg tablet Take 1 Tab by mouth every four (4) hours as needed for Anxiety. Max Daily Amount: 6 mg.  traZODone (DESYREL) 50 mg tablet Take 1 Tab by mouth nightly.  sucralfate (Carafate) 100 mg/mL suspension Take 5 mL by mouth four (4) times daily.  cyclobenzaprine (FLEXERIL) 10 mg tablet Take 1 Tab by mouth three (3) times daily as needed for Muscle Spasm(s).  Omeprazole delayed release (PRILOSEC D/R) 20 mg tablet Take 1 Tab by mouth daily.  clopidogrel (PLAVIX) 75 mg tab TAKE 1 TABLET BY MOUTH ONCE DAILY    butalbital-acetaminophen-caffeine (FIORICET, ESGIC) -40 mg per tablet TAKE 1 TABLET BY MOUTH AT THE ONSET OF HEADACHE. CAN TAKE TWICE DAILY BUT NOT MORE THAN 10 DAYS/MONTH.ONLY TO BE FILLED IN 1 MONTH INTERVALS    ibuprofen (MOTRIN) 600 mg tablet Take 1 Tab by mouth every six (6) hours as needed for Pain.     nitroglycerin (NITROSTAT) 0.4 mg SL tablet Take 1 Tab by mouth every five (5) minutes as needed for Chest Pain. Sit down then put one tab under the tongue every 5 minutes as needed    lisinopril (PRINIVIL, ZESTRIL) 10 mg tablet TAKE 1 TABLET BY MOUTH ONCE DAILY    rosuvastatin (CRESTOR) 20 mg tablet Take 1 Tab by mouth nightly.  metoprolol succinate (TOPROL-XL) 25 mg XL tablet Take 1 Tab by mouth daily. (Patient taking differently: Take 25 mg by mouth two (2) times a day.)    cholecalciferol, VITAMIN D3, (VITAMIN D3) 5,000 unit tab tablet Take 1 Tab by mouth daily.  clomiPHENE (CLOMID) 50 mg tablet One pill every other day    lidocaine, PF, (XYLOCAINE) 10 mg/mL (1 %) injection Given in office    chlorthalidone (HYGROTEN) 25 mg tablet Take 1 Tab by mouth daily.  aspirin delayed-release 81 mg tablet Take 1 Tab by mouth daily.  insulin NPH (NOVOLIN N NPH U-100 INSULIN) 100 unit/mL injection by SubCUTAneous route once. 80 units twice a day    levothyroxine (SYNTHROID) 112 mcg tablet TAKE 1 TABLET BY MOUTH ONCE DAILY BEFORE BREAKFAST    insulin regular (NOVOLIN R, HUMULIN R) 100 unit/mL injection 15-20 units TID AC (Patient taking differently: 0-20 units 2-3 times per day  Indications: 0-20u BID, per pt)     No current facility-administered medications for this visit. Allergies   Allergen Reactions    Morphine Nausea and Vomiting    Penicillin G Swelling    Percocet [Oxycodone-Acetaminophen] Hives and Nausea and Vomiting    Sulfa (Sulfonamide Antibiotics) Swelling    Tramadol Nausea Only     Nausea and headache       Review of Systems:  - Eyes: no blurry vision or double vision  - Cardiovascular: no chest pain  - Respiratory: no shortness of breath  - Musculoskeletal: no myalgias  - Neurological: no numbness/tingling in extremities    Physical Examination:  There were no vitals taken for this visit.   - GENERAL: NCAT, Appears well nourished   - EYES: EOMI, non-icteric, no proptosis   - Ear/Nose/Throat: NCAT, no visible inflammation or masses   - CARDIOVASCULAR: no cyanosis, no visible JVD - RESPIRATORY: respiratory effort normal without any distress or labored breathing   - MUSCULOSKELETAL: Normal ROM of neck and upper extremities observed   - SKIN: No rash on face   - NEUROLOGIC:  No facial asymmetry (Cranial nerve 7 motor function), No gaze palsy   - PSYCHIATRIC: Normal affect, Normal insight and judgement           Data Reviewed:   Component      Latest Ref Rng & Units 3/11/2020 3/11/2020           8:05 PM  8:05 PM   WBC      4.1 - 11.1 K/uL  7.9   RBC      4.10 - 5.70 M/uL  4.84   HGB      12.1 - 17.0 g/dL  13.3   HCT      36.6 - 50.3 %  39.7   MCV      80.0 - 99.0 FL  82.0   MCH      26.0 - 34.0 PG  27.5   MCHC      30.0 - 36.5 g/dL  33.5   RDW      11.5 - 14.5 %  13.6   PLATELET      012 - 699 K/uL  173   MPV      8.9 - 12.9 FL  10.8   NRBC      0  WBC  0.0   ABSOLUTE NRBC      0.00 - 0.01 K/uL  0.00   NEUTROPHILS      32 - 75 %  57   LYMPHOCYTES      12 - 49 %  30   MONOCYTES      5 - 13 %  7   EOSINOPHILS      0 - 7 %  4   BASOPHILS      0 - 1 %  1   IMMATURE GRANULOCYTES      0.0 - 0.5 %  1 (H)   ABS. NEUTROPHILS      1.8 - 8.0 K/UL  4.6   ABS. LYMPHOCYTES      0.8 - 3.5 K/UL  2.4   ABS. MONOCYTES      0.0 - 1.0 K/UL  0.6   ABS. EOSINOPHILS      0.0 - 0.4 K/UL  0.3   ABS. BASOPHILS      0.0 - 0.1 K/UL  0.1   ABS. IMM. GRANS.      0.00 - 0.04 K/UL  0.1 (H)   DF        AUTOMATED   Sodium      136 - 145 mmol/L 134 (L)    Potassium      3.5 - 5.1 mmol/L 4.6    Chloride      97 - 108 mmol/L 103    CO2      21 - 32 mmol/L 25    Anion gap      5 - 15 mmol/L 6    Glucose      65 - 100 mg/dL 351 (H)    BUN      6 - 20 MG/DL 13    Creatinine      0.70 - 1.30 MG/DL 1.20    BUN/Creatinine ratio      12 - 20   11 (L)    GFR est AA      >60 ml/min/1.73m2 >60    GFR est non-AA      >60 ml/min/1.73m2 >60    Calcium      8.5 - 10.1 MG/DL 8.5    Bilirubin, total      0.2 - 1.0 MG/DL 0.4    ALT (SGPT)      12 - 78 U/L 39    AST      15 - 37 U/L 34    Alk.  phosphatase      45 - 117 U/L 71    Protein, total      6.4 - 8.2 g/dL 7.3    Albumin      3.5 - 5.0 g/dL 3.4 (L)    Globulin      2.0 - 4.0 g/dL 3.9    A-G Ratio      1.1 - 2.2   0.9 (L)        Assessment/Plan:   1) DM > His BGs remain elevated, he is no longer having issues of low BGs. Will have pt continue the NPH 70 units in the AM and 70 units HS and increase his Novolin R to 30 units with breakfast, 30 units with lunch and 30 units with dinner  plus 2:50 for BG > 150. .  Will order an A1C today. Pt to check his BGs 4 times per day and mail his BG logs to me in 2 weeks. 2) Hypothyroidism > Pt is euthyroid on LT4 112mcg daily. Will order TFTs and adjust his dose as needed. 3) Hypogonadism > He has been taking the Clomid, but notes his ED and poor libido have not improved. Will order a testosterone level    Pt voices understanding and agreement with the plan.   Pain noted and pt was recommended to call his PCP for further evaluation and treatment, as needed    RTC 5/22/20       Copy sent to:  Dr. Benjamin Ramirez

## 2020-04-21 NOTE — PATIENT INSTRUCTIONS
1) NPH: 70 units in the morning and 70 units at bedtime. 2) R: Take 30 units with each meal (only take R when you eat). 3) Correction: 
 
150-199 2 units 200-249 4 units 250-299 6 units 300-349 8 units 350-399 10 units 400-449 12 units 450-499 14 units 500-549 16 units 550 + 18 units

## 2020-05-03 DIAGNOSIS — E03.9 ACQUIRED HYPOTHYROIDISM: ICD-10-CM

## 2020-05-04 RX ORDER — LEVOTHYROXINE SODIUM 112 UG/1
TABLET ORAL
Qty: 90 TAB | Refills: 0 | OUTPATIENT
Start: 2020-05-04

## 2020-05-05 RX ORDER — LEVOTHYROXINE SODIUM 112 UG/1
TABLET ORAL
Qty: 90 TAB | Refills: 3 | Status: SHIPPED | OUTPATIENT
Start: 2020-05-05 | End: 2021-05-03

## 2020-05-08 ENCOUNTER — VIRTUAL VISIT (OUTPATIENT)
Dept: BEHAVIORAL/MENTAL HEALTH CLINIC | Age: 40
End: 2020-05-08

## 2020-05-08 DIAGNOSIS — F43.29 ADJUSTMENT DISORDER WITH MIXED EMOTIONAL FEATURES: ICD-10-CM

## 2020-05-08 DIAGNOSIS — F34.1 DYSTHYMIA: ICD-10-CM

## 2020-05-08 DIAGNOSIS — F06.4 ANXIETY DISORDER DUE TO GENERAL MEDICAL CONDITION WITH PANIC ATTACK: ICD-10-CM

## 2020-05-08 DIAGNOSIS — F41.9 ANXIETY: ICD-10-CM

## 2020-05-08 DIAGNOSIS — F41.0 ANXIETY DISORDER DUE TO GENERAL MEDICAL CONDITION WITH PANIC ATTACK: ICD-10-CM

## 2020-05-08 DIAGNOSIS — G47.00 INSOMNIA DISORDER WITH NON-SLEEP DISORDER MENTAL COMORBIDITY: Primary | ICD-10-CM

## 2020-05-08 RX ORDER — BUSPIRONE HYDROCHLORIDE 15 MG/1
15 TABLET ORAL 2 TIMES DAILY
Qty: 60 TAB | Refills: 5 | Status: SHIPPED | OUTPATIENT
Start: 2020-05-08 | End: 2021-03-10 | Stop reason: SDUPTHER

## 2020-05-08 RX ORDER — SERTRALINE HYDROCHLORIDE 100 MG/1
100 TABLET, FILM COATED ORAL DAILY
Qty: 30 TAB | Refills: 5 | Status: SHIPPED | OUTPATIENT
Start: 2020-05-08 | End: 2021-04-05 | Stop reason: SDUPTHER

## 2020-05-08 RX ORDER — LORAZEPAM 1 MG/1
1 TABLET ORAL
Qty: 30 TAB | Refills: 5 | Status: SHIPPED | OUTPATIENT
Start: 2020-05-08 | End: 2021-12-15

## 2020-05-08 NOTE — PROGRESS NOTES
Heather Poe is a 38 y.o. male evaluated via audio only technology on 5/8/2020. He is a   male with symptoms suggestive of an adjusttment disorder with mixed emotional features in a prolonged grieving man(death of grandparents, divorce from wife and numerous medical problems, including hypogonadism). He was anxious and did not sleep well . Trazadone 50mg was added to his regimen for insomnia which helped him with sound sleep but he woke up groggy and therefore stopped taking it. He remains anxious when his father loses his temper and acts out. Consent: He and/or his health care decision maker is aware that he may receive a bill for this audio only encounter, depending on his insurance coverage, and has provided verbal consent to proceed: Yes    I communicated with the patient and/or health care decision maker about the nature and details of the following:  Assessment & Plan:   Diagnoses and all orders for this visit:    1. Insomnia disorder with non-sleep disorder mental comorbidity    2. Anxiety disorder due to general medical condition with panic attack    3. Dysthymia    4. Adjustment disorder with mixed emotional features    5. Anxiety    He was reminded and counseled on the importance of sleep in his case. He was receptive. 12  Subjective:   Heather Poe is a 38 y.o. male who was seen for Anxiety    He indicated his frustration with his father's temper and inability to obtain any benefits. He is unable to return to work due to cervical vertebral disc prolapses that may given in any time and render him paralyzed. He has his son with him and lives with his parents at this time, helping them out. Prior to Admission medications    Medication Sig Start Date End Date Taking?  Authorizing Provider   levothyroxine (SYNTHROID) 112 mcg tablet TAKE 1 TABLET BY MOUTH ONCE DAILY BEFORE BREAKFAST 5/5/20   Jacqueline Lema MD   rosuvastatin (CRESTOR) 20 mg tablet Take 1 tablet by mouth nightly 4/29/20   Bob Hurst MD   aluminum & magnesium hydroxide-simethicone (Maalox Maximum Strength) 400-400-40 mg/5 mL suspension Take 10 mL by mouth every six (6) hours as needed for Indigestion. Provider, Historical   metFORMIN (GLUCOPHAGE) 500 mg tablet TAKE 1 TABLET BY MOUTH TWICE DAILY WITH MEALS 2/27/20   Provider, Historical   busPIRone (BUSPAR) 15 mg tablet Take 1 Tab by mouth two (2) times a day. 3/24/20   Betzaida Villeda MD   sertraline (ZOLOFT) 100 mg tablet Take 1 Tab by mouth daily. 3/24/20   Betzaida Villeda MD   LORazepam (ATIVAN) 1 mg tablet Take 1 Tab by mouth every four (4) hours as needed for Anxiety. Max Daily Amount: 6 mg. 3/24/20   Betzaida Villeda MD   traZODone (DESYREL) 50 mg tablet Take 1 Tab by mouth nightly. 3/24/20   Betzaida Villeda MD   sucralfate (Carafate) 100 mg/mL suspension Take 5 mL by mouth four (4) times daily. 3/12/20   Nhung Toribio MD   cyclobenzaprine (FLEXERIL) 10 mg tablet Take 1 Tab by mouth three (3) times daily as needed for Muscle Spasm(s). 3/12/20   Nhung Toribio MD   Omeprazole delayed release (PRILOSEC D/R) 20 mg tablet Take 1 Tab by mouth daily. 2/14/20   Kenneth Jacobs DO   clopidogrel (PLAVIX) 75 mg tab TAKE 1 TABLET BY MOUTH ONCE DAILY 12/23/19   Provider, Historical   butalbital-acetaminophen-caffeine (FIORICET, ESGIC) -40 mg per tablet TAKE 1 TABLET BY MOUTH AT THE ONSET OF HEADACHE. CAN TAKE TWICE DAILY BUT NOT MORE THAN 10 DAYS/MONTH.ONLY TO BE FILLED IN 1 MONTH INTERVALS 1/13/20   Megan Guallpa MD   ibuprofen (MOTRIN) 600 mg tablet Take 1 Tab by mouth every six (6) hours as needed for Pain. 1/8/20   Charlie Fields MD   nitroglycerin (NITROSTAT) 0.4 mg SL tablet Take 1 Tab by mouth every five (5) minutes as needed for Chest Pain.  Sit down then put one tab under the tongue every 5 minutes as needed 12/4/19   Charlie Fields MD   lisinopril (PRINIVIL, ZESTRIL) 10 mg tablet TAKE 1 TABLET BY MOUTH ONCE DAILY  Patient taking differently: two (2) times a day. TAKE 1 TABLET BY MOUTH TWICE DAILY 12/3/19   Maximilian Ruiz MD   metoprolol succinate (TOPROL-XL) 25 mg XL tablet Take 1 Tab by mouth daily. Patient taking differently: Take 25 mg by mouth two (2) times a day. 12/3/19   Maximilian Riuz MD   cholecalciferol, VITAMIN D3, (VITAMIN D3) 5,000 unit tab tablet Take 1 Tab by mouth daily. 11/19/19   Walker Hernandes MD   clomiPHENE (CLOMID) 50 mg tablet One pill every other day 10/30/19   Alirio Iverson MD   lidocaine, PF, (XYLOCAINE) 10 mg/mL (1 %) injection Given in office 10/25/19   Oralai Liao MD   chlorthalidone (HYGROTEN) 25 mg tablet Take 1 Tab by mouth daily. 10/17/19   Domah, Isa Duane, MD   aspirin delayed-release 81 mg tablet Take 1 Tab by mouth daily. 9/24/19   Regine Verduzco MD   insulin NPH (NOVOLIN N NPH U-100 INSULIN) 100 unit/mL injection by SubCUTAneous route once. 70 units twice a day    Provider, Historical   insulin regular (NOVOLIN R, HUMULIN R) 100 unit/mL injection 15-20 units TID AC  Patient taking differently: 18-20 units 2-3 times per day. Indications: 0-20u BID, per pt 12/16/16   Keesha Wallace MD     Allergies   Allergen Reactions    Morphine Nausea and Vomiting    Penicillin G Swelling    Percocet [Oxycodone-Acetaminophen] Hives and Nausea and Vomiting    Sulfa (Sulfonamide Antibiotics) Swelling    Tramadol Nausea Only     Nausea and headache       ROS: as noted he is a cardiac patient and insulin dependent diabetic with numerous medical disorders but is currently stable. He sounded lucid, w/o any speech or thought disorder. NO perceptual distortions noted, no delusions, denied any SI/HI. He was full cognizant of the current events and oriented to all spheres. I affirm this is a Patient-Initiated Episode with a Patient who has not had a related appointment within my department in the past 7 days or scheduled within the next 24 hours.     Total Time: minutes: 11-20 minutes    Note: not billable if this call serves to triage the patient into an appointment for the relevant concern      Jaime Zabala MD (3) slightly limited

## 2020-05-22 ENCOUNTER — VIRTUAL VISIT (OUTPATIENT)
Dept: ENDOCRINOLOGY | Age: 40
End: 2020-05-22

## 2020-05-22 DIAGNOSIS — E10.42 TYPE 1 DIABETES MELLITUS WITH DIABETIC POLYNEUROPATHY (HCC): ICD-10-CM

## 2020-05-22 DIAGNOSIS — E10.9 TYPE 1 DIABETES MELLITUS WITHOUT COMPLICATION (HCC): Primary | ICD-10-CM

## 2020-05-22 DIAGNOSIS — E29.1 HYPOGONADISM IN MALE: ICD-10-CM

## 2020-05-22 DIAGNOSIS — E03.9 ACQUIRED HYPOTHYROIDISM: ICD-10-CM

## 2020-05-22 RX ORDER — CLOMIPHENE CITRATE 50 MG/1
TABLET ORAL
Qty: 15 TAB | Refills: 5 | Status: SHIPPED | OUTPATIENT
Start: 2020-05-22 | End: 2021-08-17

## 2020-05-22 NOTE — PROGRESS NOTES
Chief Complaint   Patient presents with    Diabetes     pcp and pharmacy verified            **THIS IS A VIRTUAL VISIT VIA A VIDEO ENCOUNTER. PATIENT AGREED TO HAVE THEIR CARE DELIVERED OVER VIDEO IN PLACE OF THEIR REGULARLY SCHEDULED OFFICE VISIT**      History of Present Illness: Girtha Siemens is a 38 y.o. male here for follow up of diabetes. 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 Lyndsey Orourke of Cardiology. He was also in the ED in January 2020 with issues of N/V. He was tested for influenza and it was negative. For his issues of vertigo, and dizziness he is followed by Dr. Sharyn Mims of Neurology. At our last visit in April 2020 I increased his insulin regimen, he was no longer having issues of hypoglycemia, but he was having frequent hyperglycemia. Pt instructed to continue the NPH 70 units in the AM and 70 units HS and to increase his Novolin R to 30 units with breakfast, 30 units with lunch and 30 units with dinner, plus 2:50 correction scale. He is now following with Dr. Guillermo Shah of psychiatry for anxiety and depression. Pt reports that his BGs have been \"getting better\". I have been forgetting to take my Novolin R sometimes, but I am still having his blood sugars after I eat. He notes that he has also been taking his Novolin R after he eats. His BGs are high after dinner and in the early morning and have been lower in the afternoon. Pt notes he has been taking NPH 70 units in the AM and 70 units HS and to increase his Novolin R to 20-30 units with meals plus the correction scale. He has had 2 episodes of low BG, one around dinner, the other during the afternoon. He has not been having any issues of low BGs after dinner or in the evening. He notes that his PO intake has been down because of \"stomach problems\".  He notes that every since he had the PCI and stenting he had had issues of gastric pain and he notes that he gets very bloated. He notes that he will eat 1/2 a sandwich and \"I feel like I have eaten a 3 course meal.\"    Pt is eating 2 meals per day  He has breakfast, around 9AM-Noon, today he had two slices of pizza and diet soda. He notes that he will occasionally snack during the day on crackers, trail mix or chips. He has dinner around 6-9PM, last night he had 6\" sub (steak and cheese) no side items and diet fruit drink. Pt has hx of CAD s/p stenting in December 2019. Pt has hx of neuropathy. No hx of nephropathy. Last eye exam was February 2020. No retinopathy. Pt has hx of hypothyroidism, since the age of 11-9 years old. He is currently taking 112mcg daily. He was biochemically euthyroid in October 2019. At our visit in October 2019 we tested his Thyroid labs, which were normal, but he had a very low Testosterone of 199 on 10/22/19 and repeat was 240 in 10/28/19. His FSH, LH and prolactin were unremarkable. Pt opted to start not Clomid 50mg every other day. At our last visit we again ordered the clomid. Pt notes that he has been taking the Clomid 50mg every other day. He notes he is still having issues of ED and poor libido. Current Outpatient Medications   Medication Sig    LORazepam (ATIVAN) 1 mg tablet Take 1 Tab by mouth every four (4) hours as needed for Anxiety. Max Daily Amount: 6 mg.  busPIRone (BUSPAR) 15 mg tablet Take 1 Tab by mouth two (2) times a day.  sertraline (ZOLOFT) 100 mg tablet Take 1 Tab by mouth daily.  levothyroxine (SYNTHROID) 112 mcg tablet TAKE 1 TABLET BY MOUTH ONCE DAILY BEFORE BREAKFAST    rosuvastatin (CRESTOR) 20 mg tablet Take 1 tablet by mouth nightly    aluminum & magnesium hydroxide-simethicone (Maalox Maximum Strength) 400-400-40 mg/5 mL suspension Take 10 mL by mouth every six (6) hours as needed for Indigestion.     metFORMIN (GLUCOPHAGE) 500 mg tablet TAKE 1 TABLET BY MOUTH TWICE DAILY WITH MEALS    cyclobenzaprine (FLEXERIL) 10 mg tablet Take 1 Tab by mouth three (3) times daily as needed for Muscle Spasm(s).  Omeprazole delayed release (PRILOSEC D/R) 20 mg tablet Take 1 Tab by mouth daily.  clopidogrel (PLAVIX) 75 mg tab TAKE 1 TABLET BY MOUTH ONCE DAILY    butalbital-acetaminophen-caffeine (FIORICET, ESGIC) -40 mg per tablet TAKE 1 TABLET BY MOUTH AT THE ONSET OF HEADACHE. CAN TAKE TWICE DAILY BUT NOT MORE THAN 10 DAYS/MONTH.ONLY TO BE FILLED IN 1 MONTH INTERVALS    ibuprofen (MOTRIN) 600 mg tablet Take 1 Tab by mouth every six (6) hours as needed for Pain.  nitroglycerin (NITROSTAT) 0.4 mg SL tablet Take 1 Tab by mouth every five (5) minutes as needed for Chest Pain. Sit down then put one tab under the tongue every 5 minutes as needed    lisinopril (PRINIVIL, ZESTRIL) 10 mg tablet TAKE 1 TABLET BY MOUTH ONCE DAILY (Patient taking differently: two (2) times a day. TAKE 1 TABLET BY MOUTH TWICE DAILY)    metoprolol succinate (TOPROL-XL) 25 mg XL tablet Take 1 Tab by mouth daily. (Patient taking differently: Take 25 mg by mouth two (2) times a day.)    cholecalciferol, VITAMIN D3, (VITAMIN D3) 5,000 unit tab tablet Take 1 Tab by mouth daily.  clomiPHENE (CLOMID) 50 mg tablet One pill every other day    aspirin delayed-release 81 mg tablet Take 1 Tab by mouth daily.  insulin NPH (NOVOLIN N NPH U-100 INSULIN) 100 unit/mL injection by SubCUTAneous route once. 70 units twice a day    insulin regular (NOVOLIN R, HUMULIN R) 100 unit/mL injection 15-20 units TID AC (Patient taking differently: 20-30 units with each meal 3 times per day)    traZODone (DESYREL) 50 mg tablet Take 1 Tab by mouth nightly.  sucralfate (Carafate) 100 mg/mL suspension Take 5 mL by mouth four (4) times daily.  lidocaine, PF, (XYLOCAINE) 10 mg/mL (1 %) injection Given in office    chlorthalidone (HYGROTEN) 25 mg tablet Take 1 Tab by mouth daily. No current facility-administered medications for this visit.       Allergies Allergen Reactions    Morphine Nausea and Vomiting    Penicillin G Swelling    Percocet [Oxycodone-Acetaminophen] Hives and Nausea and Vomiting    Sulfa (Sulfonamide Antibiotics) Swelling    Tramadol Nausea Only     Nausea and headache       Review of Systems:  - Eyes: no blurry vision or double vision  - Cardiovascular: no chest pain  - Respiratory: no shortness of breath  - Musculoskeletal: no myalgias  - Neurological: no numbness/tingling in extremities    Physical Examination:  There were no vitals taken for this visit. - GENERAL: NCAT, Appears well nourished   - EYES: EOMI, non-icteric, no proptosis   - Ear/Nose/Throat: NCAT, no visible inflammation or masses   - CARDIOVASCULAR: no cyanosis, no visible JVD   - RESPIRATORY: respiratory effort normal without any distress or labored breathing   - MUSCULOSKELETAL: Normal ROM of neck and upper extremities observed   - SKIN: No rash on face   - NEUROLOGIC:  No facial asymmetry (Cranial nerve 7 motor function), No gaze palsy   - PSYCHIATRIC: Normal affect, Normal insight and judgement           Data Reviewed:   Component      Latest Ref Rng & Units 3/11/2020 3/11/2020           8:05 PM  8:05 PM   WBC      4.1 - 11.1 K/uL  7.9   RBC      4.10 - 5.70 M/uL  4.84   HGB      12.1 - 17.0 g/dL  13.3   HCT      36.6 - 50.3 %  39.7   MCV      80.0 - 99.0 FL  82.0   MCH      26.0 - 34.0 PG  27.5   MCHC      30.0 - 36.5 g/dL  33.5   RDW      11.5 - 14.5 %  13.6   PLATELET      948 - 173 K/uL  173   MPV      8.9 - 12.9 FL  10.8   NRBC      0  WBC  0.0   ABSOLUTE NRBC      0.00 - 0.01 K/uL  0.00   NEUTROPHILS      32 - 75 %  57   LYMPHOCYTES      12 - 49 %  30   MONOCYTES      5 - 13 %  7   EOSINOPHILS      0 - 7 %  4   BASOPHILS      0 - 1 %  1   IMMATURE GRANULOCYTES      0.0 - 0.5 %  1 (H)   ABS. NEUTROPHILS      1.8 - 8.0 K/UL  4.6   ABS. LYMPHOCYTES      0.8 - 3.5 K/UL  2.4   ABS. MONOCYTES      0.0 - 1.0 K/UL  0.6   ABS.  EOSINOPHILS      0.0 - 0.4 K/UL  0.3 ABS. BASOPHILS      0.0 - 0.1 K/UL  0.1   ABS. IMM. GRANS.      0.00 - 0.04 K/UL  0.1 (H)   DF        AUTOMATED   Sodium      136 - 145 mmol/L 134 (L)    Potassium      3.5 - 5.1 mmol/L 4.6    Chloride      97 - 108 mmol/L 103    CO2      21 - 32 mmol/L 25    Anion gap      5 - 15 mmol/L 6    Glucose      65 - 100 mg/dL 351 (H)    BUN      6 - 20 MG/DL 13    Creatinine      0.70 - 1.30 MG/DL 1.20    BUN/Creatinine ratio      12 - 20   11 (L)    GFR est AA      >60 ml/min/1.73m2 >60    GFR est non-AA      >60 ml/min/1.73m2 >60    Calcium      8.5 - 10.1 MG/DL 8.5    Bilirubin, total      0.2 - 1.0 MG/DL 0.4    ALT (SGPT)      12 - 78 U/L 39    AST      15 - 37 U/L 34    Alk. phosphatase      45 - 117 U/L 71    Protein, total      6.4 - 8.2 g/dL 7.3    Albumin      3.5 - 5.0 g/dL 3.4 (L)    Globulin      2.0 - 4.0 g/dL 3.9    A-G Ratio      1.1 - 2.2   0.9 (L)        Assessment/Plan:   1) DM > His BGs remain elevated, though he notes he has been missing doses. He has had some lower BGs in the afternoon and his evening BGs are consistently high. Will have pt continue the NPH 70 units in the AM and 70 units HS and increase his Novolin R to 20 units with breakfast, 20 units with lunch and 40 units with dinner  plus 2:50 for BG > 150. Pt to check his BGs 4 times per day and mail his BG logs to me in 2 weeks. 2) Hypothyroidism > Pt is euthyroid on LT4 112mcg daily. Will order TFTs and adjust his dose as needed. 3) Hypogonadism > He has been taking the Clomid, but notes his ED and poor libido have not improved. Will order a testosterone level    Pt voices understanding and agreement with the plan.   Pain noted and pt was recommended to call his PCP for further evaluation and treatment, as needed    RTC 3 months      Copy sent to:  Dr. Willard Hernandez

## 2020-06-11 NOTE — LETTER
[facility] EMERGENCY DEPT 
[address] [place] [address] [id number] [phone] Work/School Note Date: 3/20/2018 To Whom It May concern: 
 
Rosi Montoya was seen and treated today in the emergency room by the following provider(s): 
Attending Provider: Bhavin Ricketts MD 
Physician Assistant: Carla Mendieta. Dione Preciado. Rosi Montoya may return to work on 3/22/2018. Sincerely, 
 
 
 
 
Carla Mendieta.  Dione Preciado 
 
 
 
 thin liquid/mech soft/ground

## 2020-06-25 ENCOUNTER — HOSPITAL ENCOUNTER (EMERGENCY)
Age: 40
Discharge: HOME | End: 2020-06-25
Attending: EMERGENCY MEDICINE
Payer: MEDICAID

## 2020-06-25 VITALS
HEART RATE: 71 BPM | WEIGHT: 252.87 LBS | RESPIRATION RATE: 16 BRPM | HEIGHT: 69 IN | OXYGEN SATURATION: 99 % | DIASTOLIC BLOOD PRESSURE: 66 MMHG | BODY MASS INDEX: 37.45 KG/M2 | SYSTOLIC BLOOD PRESSURE: 152 MMHG | TEMPERATURE: 98.1 F

## 2020-06-25 DIAGNOSIS — M54.2 CERVICALGIA: Primary | ICD-10-CM

## 2020-06-25 PROCEDURE — 74011250637 HC RX REV CODE- 250/637: Performed by: PHYSICIAN ASSISTANT

## 2020-06-25 PROCEDURE — 99283 EMERGENCY DEPT VISIT LOW MDM: CPT

## 2020-06-25 RX ORDER — CYCLOBENZAPRINE HCL 10 MG
10 TABLET ORAL
Qty: 20 TAB | Refills: 0 | Status: SHIPPED | OUTPATIENT
Start: 2020-06-25 | End: 2020-08-26 | Stop reason: ALTCHOICE

## 2020-06-25 RX ORDER — HYDROCODONE BITARTRATE AND ACETAMINOPHEN 5; 325 MG/1; MG/1
1 TABLET ORAL
Status: COMPLETED | OUTPATIENT
Start: 2020-06-25 | End: 2020-06-25

## 2020-06-25 RX ORDER — HYDROCODONE BITARTRATE AND ACETAMINOPHEN 5; 325 MG/1; MG/1
1 TABLET ORAL
Qty: 9 TAB | Refills: 0 | Status: SHIPPED | OUTPATIENT
Start: 2020-06-25 | End: 2020-06-28

## 2020-06-25 RX ORDER — CYCLOBENZAPRINE HCL 10 MG
10 TABLET ORAL
Status: COMPLETED | OUTPATIENT
Start: 2020-06-25 | End: 2020-06-25

## 2020-06-25 RX ADMIN — CYCLOBENZAPRINE HYDROCHLORIDE 10 MG: 10 TABLET, FILM COATED ORAL at 17:39

## 2020-06-25 RX ADMIN — HYDROCODONE BITARTRATE AND ACETAMINOPHEN 1 TABLET: 5; 325 TABLET ORAL at 17:39

## 2020-06-25 NOTE — ED PROVIDER NOTES
EMERGENCY DEPARTMENT HISTORY AND PHYSICAL EXAM      Date: 6/25/2020  Patient Name: Priscilla Mosqueda    History of Presenting Illness     Chief Complaint   Patient presents with    Neck Pain     hx of bulging disk at C5-6.  he turned his neck over the weekend and has had pain and headache since       History Provided By: Patient    HPI: Priscilla Mosqueda, 38 y.o. male with a history of insulin-dependent diabetes, chronic headaches, hypertension, depression and others as below presents ambulatory to the ED with cc of several days of 6 out of 10 constant, aching posterior neck pain that is worse with movement. He tells me he knows that he has a \"bulging disc at C5\" that was diagnosed on MRI sometime ago. He has seen Ortho/pain management for epidural steroid injections in the past with Dr. Lucia Mancilla, however he had no lasting benefit from that procedure. He tells me this weekend he went to turn his head and felt a sudden, sharp \"pop\". Pain is well localized and does not radiate. He denies numbness or weakness. There has been no fever. He tells me he has taken ibuprofen with no significant improvement. There are no other complaints, changes, or physical findings at this time. PCP: Desire Martino MD    Current Outpatient Medications   Medication Sig Dispense Refill    HYDROcodone-acetaminophen (NORCO) 5-325 mg per tablet Take 1 Tab by mouth every eight (8) hours as needed for Pain for up to 3 days. Max Daily Amount: 3 Tabs. 9 Tab 0    cyclobenzaprine (FLEXERIL) 10 mg tablet Take 1 Tab by mouth three (3) times daily as needed for Muscle Spasm(s). 20 Tab 0    clomiPHENE (CLOMID) 50 mg tablet One pill every other day 15 Tab 5    insulin regular (NOVOLIN R, HUMULIN R) 100 unit/mL injection 20 with breakfast and 40 units with dinner plus correction.  160 units per day max 3 Vial 12    insulin NPH (NovoLIN N NPH U-100 Insulin) 100 unit/mL injection 70 units twice a day 4 Vial 12    LORazepam (ATIVAN) 1 mg tablet Take 1 Tab by mouth every four (4) hours as needed for Anxiety. Max Daily Amount: 6 mg. 30 Tab 5    busPIRone (BUSPAR) 15 mg tablet Take 1 Tab by mouth two (2) times a day. 60 Tab 5    sertraline (ZOLOFT) 100 mg tablet Take 1 Tab by mouth daily. 30 Tab 5    levothyroxine (SYNTHROID) 112 mcg tablet TAKE 1 TABLET BY MOUTH ONCE DAILY BEFORE BREAKFAST 90 Tab 3    rosuvastatin (CRESTOR) 20 mg tablet Take 1 tablet by mouth nightly 30 Tab 3    aluminum & magnesium hydroxide-simethicone (Maalox Maximum Strength) 400-400-40 mg/5 mL suspension Take 10 mL by mouth every six (6) hours as needed for Indigestion.  metFORMIN (GLUCOPHAGE) 500 mg tablet TAKE 1 TABLET BY MOUTH TWICE DAILY WITH MEALS      cyclobenzaprine (FLEXERIL) 10 mg tablet Take 1 Tab by mouth three (3) times daily as needed for Muscle Spasm(s). 12 Tab 0    Omeprazole delayed release (PRILOSEC D/R) 20 mg tablet Take 1 Tab by mouth daily. 20 Tab 0    clopidogrel (PLAVIX) 75 mg tab TAKE 1 TABLET BY MOUTH ONCE DAILY      butalbital-acetaminophen-caffeine (FIORICET, ESGIC) -40 mg per tablet TAKE 1 TABLET BY MOUTH AT THE ONSET OF HEADACHE. CAN TAKE TWICE DAILY BUT NOT MORE THAN 10 DAYS/MONTH.ONLY TO BE FILLED IN 1 MONTH INTERVALS 20 Tab 0    ibuprofen (MOTRIN) 600 mg tablet Take 1 Tab by mouth every six (6) hours as needed for Pain. 20 Tab 0    nitroglycerin (NITROSTAT) 0.4 mg SL tablet Take 1 Tab by mouth every five (5) minutes as needed for Chest Pain. Sit down then put one tab under the tongue every 5 minutes as needed 1 Bottle 0    lisinopril (PRINIVIL, ZESTRIL) 10 mg tablet TAKE 1 TABLET BY MOUTH ONCE DAILY (Patient taking differently: two (2) times a day. TAKE 1 TABLET BY MOUTH TWICE DAILY) 90 Tab 3    metoprolol succinate (TOPROL-XL) 25 mg XL tablet Take 1 Tab by mouth daily.  (Patient taking differently: Take 25 mg by mouth two (2) times a day.) 90 Tab 3    cholecalciferol, VITAMIN D3, (VITAMIN D3) 5,000 unit tab tablet Take 1 Tab by mouth daily. 90 Tab 1    aspirin delayed-release 81 mg tablet Take 1 Tab by mouth daily. 27 Tab 0     Past History     Past Medical History:  Past Medical History:   Diagnosis Date    Anxiety     Chronic headaches 2007    Depression     Diabetes ([address]     GERD (gastroesophageal reflux disease)     Hypercholesterolemia     Hypertension     Thyroid disease     hypothyroid    Unspecified sleep apnea     does not use CPAP       Past Surgical History:  Past Surgical History:   Procedure Laterality Date    HX CHOLECYSTECTOMY  14    Dr Kimberly Aponte    HX HEENT      wisdom teeth removed    HX ORTHOPAEDIC Left 2012    carpel tunnel       Family History:  Family History   Problem Relation Age of Onset    Diabetes Mother     Heart Disease Father     Cancer Father     Diabetes Father     Diabetes Paternal Aunt     Diabetes Maternal Grandmother     Thyroid Cancer Maternal Grandmother     Kidney Disease Maternal Grandmother     Arthritis Maternal Grandmother     Heart Disease Maternal Grandfather     High Cholesterol Maternal Grandfather     Hypertension Maternal Grandfather     Diabetes Paternal Grandmother     Hemophilia Paternal Grandfather        Social History:  Social History     Tobacco Use    Smoking status: Former Smoker     Packs/day: 0.00     Years: 14.00     Pack years: 0.00     Last attempt to quit: 2014     Years since quittin.4    Smokeless tobacco: Never Used   Substance Use Topics    Alcohol use: No    Drug use: No       Allergies: Allergies   Allergen Reactions    Morphine Nausea and Vomiting    Penicillin G Swelling    Percocet [Oxycodone-Acetaminophen] Hives and Nausea and Vomiting    Sulfa (Sulfonamide Antibiotics) Swelling    Tramadol Nausea Only     Nausea and headache       Review of Systems   Review of Systems   Constitutional: Negative for fatigue and fever. HENT: Negative for congestion, ear pain and rhinorrhea. Eyes: Negative for pain and redness. Respiratory: Negative for cough and wheezing. Cardiovascular: Negative for chest pain and palpitations. Gastrointestinal: Negative for abdominal pain, nausea and vomiting. Genitourinary: Negative for dysuria, frequency and urgency. Musculoskeletal: Positive for neck pain. Negative for back pain and neck stiffness. Skin: Negative for rash and wound. Neurological: Negative for weakness, light-headedness, numbness and headaches. Physical Exam   Physical Exam  Vitals signs and nursing note reviewed. Constitutional:       General: He is not in acute distress. Appearance: He is well-developed. He is not toxic-appearing. HENT:      Head: Normocephalic and atraumatic. No right periorbital erythema or left periorbital erythema. Jaw: No trismus. Right Ear: External ear normal.      Left Ear: External ear normal.      Nose: Nose normal.      Mouth/Throat:      Pharynx: Uvula midline. Eyes:      General: No scleral icterus. Conjunctiva/sclera: Conjunctivae normal.      Pupils: Pupils are equal, round, and reactive to light. Neck:      Musculoskeletal: Full passive range of motion without pain and normal range of motion. Cardiovascular:      Rate and Rhythm: Normal rate and regular rhythm. Heart sounds: Normal heart sounds. Pulmonary:      Effort: Pulmonary effort is normal. No tachypnea, accessory muscle usage or respiratory distress. Breath sounds: Normal breath sounds. No decreased breath sounds or wheezing. Abdominal:      Palpations: Abdomen is soft. Abdomen is not rigid. Tenderness: There is no abdominal tenderness. There is no guarding. Musculoskeletal: Normal range of motion. Cervical back: He exhibits tenderness. Back:       Comments: CERVICAL SPINE:  Full active range of motion of all extremities  No bruising, redness or swelling  Primarily right-sided neck pain with palpation   Skin:     Findings: No rash.    Neurological:      Mental Status: He is alert and oriented to person, place, and time. He is not disoriented. GCS: GCS eye subscore is 4. GCS verbal subscore is 5. GCS motor subscore is 6. Cranial Nerves: No cranial nerve deficit. Sensory: No sensory deficit. Psychiatric:         Speech: Speech normal.       Diagnostic Study Results     Labs -   No results found for this or any previous visit (from the past 12 hour(s)). Radiologic Studies -   No orders to display     CT Results  (Last 48 hours)    None        CXR Results  (Last 48 hours)    None        Medical Decision Making   I am the first provider for this patient. I reviewed the vital signs, available nursing notes, past medical history, past surgical history, family history and social history. Vital Signs-Reviewed the patient's vital signs. Patient Vitals for the past 12 hrs:   Temp Pulse Resp BP SpO2   06/25/20 1642 98.1 °F (36.7 °C) 71 16 152/66 99 %       Pulse Oximetry Analysis - 99% on RA    Records Reviewed: Nursing Notes, Old Medical Records, Previous Radiology Studies, Previous Laboratory Studies and     Provider Notes (Medical Decision Making): Afebrile, well appearing, no new injury, presentation reflects an exacerbation of a chronic problem, plan as below     ED Course:   Initial assessment performed. The patients presenting problems have been discussed, and they are in agreement with the care plan formulated and outlined with them. I have encouraged them to ask questions as they arise throughout their visit. Disposition:  Discharge    PLAN:  1. Current Discharge Medication List      START taking these medications    Details   HYDROcodone-acetaminophen (NORCO) 5-325 mg per tablet Take 1 Tab by mouth every eight (8) hours as needed for Pain for up to 3 days. Max Daily Amount: 3 Tabs.   Qty: 9 Tab, Refills: 0    Associated Diagnoses: Cervicalgia      !! cyclobenzaprine (FLEXERIL) 10 mg tablet Take 1 Tab by mouth three (3) times daily as needed for Muscle Spasm(s). Qty: 20 Tab, Refills: 0       !! - Potential duplicate medications found. Please discuss with provider. CONTINUE these medications which have NOT CHANGED    Details   !! cyclobenzaprine (FLEXERIL) 10 mg tablet Take 1 Tab by mouth three (3) times daily as needed for Muscle Spasm(s). Qty: 12 Tab, Refills: 0      ibuprofen (MOTRIN) 600 mg tablet Take 1 Tab by mouth every six (6) hours as needed for Pain. Qty: 20 Tab, Refills: 0       !! - Potential duplicate medications found. Please discuss with provider. 2.   Follow-up Information     Follow up With Specialties Details Why Karri Lino MD Physical Medicine and Rehab Schedule an appointment as soon as possible for a visit ORTHO: as needed if symptoms persist [address] [place] [address]  [address]  [address] [address] [phone]          Return to ED if worse     Diagnosis     Clinical Impression:   1.  Cervicalgia

## 2020-06-26 ENCOUNTER — PATIENT OUTREACH (OUTPATIENT)
Dept: INTERNAL MEDICINE CLINIC | Age: 40
End: 2020-06-26

## 2020-06-26 NOTE — PROGRESS NOTES
Patient contacted regarding recent discharge and COVID-19 risk. [facility] ED 20 dx-cervicalgia    Discussed COVID-19 related testing which was not done at this time. Test results were not done. Patient informed of results, if available? N/a  Recent Travel Screening and Travel History documentation     Travel Screening       Question Response     In the last month, have you been in contact with someone who was confirmed or suspected to have Coronavirus / COVID-19? No / Unsure     Do you have any of the following symptoms? None of these     Have you traveled internationally in the last month? No      Travel History   Travel since 20     No documented travel since 20        Spoke to pt who stated he is feeling better and has the prescriptions, but not needed them. Pt will call PCP and schedule appt. Pt has received two neck injections from Dr Anthony Fontanez and may or may not call him. Pt agreed to Loop enrollment. Care Transition Nurse/ Ambulatory Care Manager contacted the patient by telephone to perform post discharge assessment. Verified name and  with patient as identifiers. Patient has following risk factors of: diabetes and severe obesity. CTN/ACM reviewed discharge instructions, medical action plan and red flags related to discharge diagnosis. Reviewed and educated them on any new and changed medications related to discharge diagnosis. Advised obtaining a 90-day supply of all daily and as-needed medications. Education provided regarding infection prevention, and signs and symptoms of COVID-19 and when to seek medical attention with patient who verbalized understanding. Discussed exposure protocols and quarantine from [address] you at higher risk for severe illness  and given an opportunity for questions and concerns. The patient agrees to contact the COVID-19 hotline [phone] or PCP office for questions related to their healthcare.  CTN/ACM provided contact information for future reference. From CDC: Are you at higher risk for severe illness?  Wash your hands often.  Avoid close contact (6 feet, which is about two arm lengths) with people who are sick.  Put distance between yourself and other people if COVID-19 is spreading in your community.  Clean and disinfect frequently touched surfaces.  Avoid all cruise travel and non-essential air travel.  Call your healthcare professional if you have concerns about COVID-19 and your underlying condition or if you are sick. For more information on steps you can take to protect yourself, see CDC's How to Protect Yourself      Patient/family/caregiver given information for [employer] Loop and agrees to enroll yes  Patient's preferred e-mail:  [email or web address]  Patient's preferred phone number: [phone]  Based on Loop alert triggers, patient will be contacted by nurse care manager for worsening symptoms. Pt will be further monitored by COVID Loop Team based on severity of symptoms and risk factors.

## 2020-07-27 PROCEDURE — 99284 EMERGENCY DEPT VISIT MOD MDM: CPT

## 2020-07-27 PROCEDURE — 96375 TX/PRO/DX INJ NEW DRUG ADDON: CPT

## 2020-07-27 PROCEDURE — 75810000053 HC SPLINT APPLICATION

## 2020-07-27 PROCEDURE — 96374 THER/PROPH/DIAG INJ IV PUSH: CPT

## 2020-07-28 ENCOUNTER — APPOINTMENT (OUTPATIENT)
Dept: GENERAL RADIOLOGY | Age: 40
End: 2020-07-28
Attending: EMERGENCY MEDICINE
Payer: MEDICAID

## 2020-07-28 ENCOUNTER — HOSPITAL ENCOUNTER (EMERGENCY)
Age: 40
Discharge: HOME | End: 2020-07-28
Attending: EMERGENCY MEDICINE
Payer: MEDICAID

## 2020-07-28 VITALS
OXYGEN SATURATION: 97 % | SYSTOLIC BLOOD PRESSURE: 119 MMHG | HEART RATE: 75 BPM | RESPIRATION RATE: 17 BRPM | BODY MASS INDEX: 38.2 KG/M2 | WEIGHT: 257.94 LBS | HEIGHT: 69 IN | TEMPERATURE: 98.1 F | DIASTOLIC BLOOD PRESSURE: 68 MMHG

## 2020-07-28 DIAGNOSIS — M79.643 PAIN OF HAND, UNSPECIFIED LATERALITY: Primary | ICD-10-CM

## 2020-07-28 DIAGNOSIS — M10.00 ACUTE IDIOPATHIC GOUT, UNSPECIFIED SITE: ICD-10-CM

## 2020-07-28 LAB
ANION GAP SERPL CALC-SCNC: 7 MMOL/L (ref 5–15)
BASOPHILS # BLD: 0.1 K/UL (ref 0–0.1)
BASOPHILS NFR BLD: 1 % (ref 0–1)
BUN SERPL-MCNC: 17 MG/DL (ref 6–20)
BUN/CREAT SERPL: 14 (ref 12–20)
CALCIUM SERPL-MCNC: 8.8 MG/DL (ref 8.5–10.1)
CHLORIDE SERPL-SCNC: 101 MMOL/L (ref 97–108)
CO2 SERPL-SCNC: 26 MMOL/L (ref 21–32)
CREAT SERPL-MCNC: 1.24 MG/DL (ref 0.7–1.3)
DIFFERENTIAL METHOD BLD: ABNORMAL
EOSINOPHIL # BLD: 0.3 K/UL (ref 0–0.4)
EOSINOPHIL NFR BLD: 4 % (ref 0–7)
ERYTHROCYTE [DISTWIDTH] IN BLOOD BY AUTOMATED COUNT: 13.7 % (ref 11.5–14.5)
GLUCOSE SERPL-MCNC: 397 MG/DL (ref 65–100)
HCT VFR BLD AUTO: 40.4 % (ref 36.6–50.3)
HGB BLD-MCNC: 13.8 G/DL (ref 12.1–17)
IMM GRANULOCYTES # BLD AUTO: 0.1 K/UL (ref 0–0.04)
IMM GRANULOCYTES NFR BLD AUTO: 1 % (ref 0–0.5)
LYMPHOCYTES # BLD: 2.4 K/UL (ref 0.8–3.5)
LYMPHOCYTES NFR BLD: 33 % (ref 12–49)
MCH RBC QN AUTO: 28.3 PG (ref 26–34)
MCHC RBC AUTO-ENTMCNC: 34.2 G/DL (ref 30–36.5)
MCV RBC AUTO: 83 FL (ref 80–99)
MONOCYTES # BLD: 0.5 K/UL (ref 0–1)
MONOCYTES NFR BLD: 7 % (ref 5–13)
NEUTS SEG # BLD: 4.1 K/UL (ref 1.8–8)
NEUTS SEG NFR BLD: 54 % (ref 32–75)
NRBC # BLD: 0 K/UL (ref 0–0.01)
NRBC BLD-RTO: 0 PER 100 WBC
PLATELET # BLD AUTO: 186 K/UL (ref 150–400)
PMV BLD AUTO: 10.7 FL (ref 8.9–12.9)
POTASSIUM SERPL-SCNC: 4.3 MMOL/L (ref 3.5–5.1)
RBC # BLD AUTO: 4.87 M/UL (ref 4.1–5.7)
SODIUM SERPL-SCNC: 134 MMOL/L (ref 136–145)
URATE SERPL-MCNC: 6.1 MG/DL (ref 3.5–7.2)
WBC # BLD AUTO: 7.4 K/UL (ref 4.1–11.1)

## 2020-07-28 PROCEDURE — 80048 BASIC METABOLIC PNL TOTAL CA: CPT

## 2020-07-28 PROCEDURE — 75810000053 HC SPLINT APPLICATION

## 2020-07-28 PROCEDURE — 74011250637 HC RX REV CODE- 250/637: Performed by: EMERGENCY MEDICINE

## 2020-07-28 PROCEDURE — 85025 COMPLETE CBC W/AUTO DIFF WBC: CPT

## 2020-07-28 PROCEDURE — 36415 COLL VENOUS BLD VENIPUNCTURE: CPT

## 2020-07-28 PROCEDURE — 96375 TX/PRO/DX INJ NEW DRUG ADDON: CPT

## 2020-07-28 PROCEDURE — 84550 ASSAY OF BLOOD/URIC ACID: CPT

## 2020-07-28 PROCEDURE — 96374 THER/PROPH/DIAG INJ IV PUSH: CPT

## 2020-07-28 PROCEDURE — 74011250636 HC RX REV CODE- 250/636: Performed by: EMERGENCY MEDICINE

## 2020-07-28 PROCEDURE — 73130 X-RAY EXAM OF HAND: CPT

## 2020-07-28 RX ORDER — KETOROLAC TROMETHAMINE 30 MG/ML
15 INJECTION, SOLUTION INTRAMUSCULAR; INTRAVENOUS
Status: COMPLETED | OUTPATIENT
Start: 2020-07-28 | End: 2020-07-28

## 2020-07-28 RX ORDER — HYDROCODONE BITARTRATE AND ACETAMINOPHEN 10; 325 MG/1; MG/1
2 TABLET ORAL
Qty: 20 TAB | Refills: 0 | Status: SHIPPED | OUTPATIENT
Start: 2020-07-28 | End: 2020-07-31

## 2020-07-28 RX ORDER — HYDROCODONE BITARTRATE AND ACETAMINOPHEN 10; 325 MG/1; MG/1
1 TABLET ORAL
Status: COMPLETED | OUTPATIENT
Start: 2020-07-28 | End: 2020-07-28

## 2020-07-28 RX ORDER — NAPROXEN 500 MG/1
500 TABLET ORAL 2 TIMES DAILY WITH MEALS
Qty: 6 TAB | Refills: 0 | Status: SHIPPED | OUTPATIENT
Start: 2020-07-28 | End: 2020-07-31

## 2020-07-28 RX ORDER — HYDROMORPHONE HYDROCHLORIDE 1 MG/ML
1 INJECTION, SOLUTION INTRAMUSCULAR; INTRAVENOUS; SUBCUTANEOUS ONCE
Status: COMPLETED | OUTPATIENT
Start: 2020-07-28 | End: 2020-07-28

## 2020-07-28 RX ORDER — PREDNISONE 50 MG/1
50 TABLET ORAL DAILY
Qty: 5 TAB | Refills: 0 | Status: SHIPPED | OUTPATIENT
Start: 2020-07-28 | End: 2020-08-02

## 2020-07-28 RX ADMIN — KETOROLAC TROMETHAMINE 15 MG: 30 INJECTION, SOLUTION INTRAMUSCULAR at 01:40

## 2020-07-28 RX ADMIN — HYDROMORPHONE HYDROCHLORIDE 1 MG: 1 INJECTION, SOLUTION INTRAMUSCULAR; INTRAVENOUS; SUBCUTANEOUS at 03:22

## 2020-07-28 RX ADMIN — HYDROCODONE BITARTRATE AND ACETAMINOPHEN 1 TABLET: 10; 325 TABLET ORAL at 01:39

## 2020-07-28 RX ADMIN — METHYLPREDNISOLONE SODIUM SUCCINATE 125 MG: 125 INJECTION, POWDER, FOR SOLUTION INTRAMUSCULAR; INTRAVENOUS at 01:43

## 2020-07-28 NOTE — LETTER
[address] 
[facility] EMERGENCY DEPT 
[address] [place] [address] Dandre Diaz [id number] 
[phone] Work/School Note Date: 7/27/2020 To Whom It May concern: 
 
Sona Carrasco was seen and treated today in the emergency room by the following provider(s): 
Attending Provider: Angi Cota MD.   
 
Sona Carrasco may return to work on 8/3/2020. Sincerely, Dennie Floro, MD

## 2020-07-28 NOTE — DISCHARGE INSTRUCTIONS
Patient Education        Gout: Care Instructions  Your Care Instructions     Gout is a form of arthritis caused by a buildup of uric acid crystals in a joint. It causes sudden attacks of pain, swelling, redness, and stiffness, usually in one joint, especially the big toe. Gout usually comes on without a cause. But it can be brought on by drinking alcohol (especially beer) or eating seafood and red meat. Taking certain medicines, such as diuretics or aspirin, also can bring on an attack of gout. Taking your medicines as prescribed and following up with your doctor regularly can help you avoid gout attacks in the future. Follow-up care is a key part of your treatment and safety. Be sure to make and go to all appointments, and call your doctor if you are having problems. It's also a good idea to know your test results and keep a list of the medicines you take. How can you care for yourself at home? · If the joint is swollen, put ice or a cold pack on the area for 10 to 20 minutes at a time. Put a thin cloth between the ice and your skin. · Prop up the sore limb on a pillow when you ice it or anytime you sit or lie down during the next 3 days. Try to keep it above the level of your heart. This will help reduce swelling. · Rest sore joints. Avoid activities that put weight or strain on the joints for a few days. Take short rest breaks from your regular activities during the day. · Take your medicines exactly as prescribed. Call your doctor if you think you are having a problem with your medicine. · Take pain medicines exactly as directed. ? If the doctor gave you a prescription medicine for pain, take it as prescribed. ? If you are not taking a prescription pain medicine, ask your doctor if you can take an over-the-counter medicine. · Eat less seafood and red meat. · Check with your doctor before drinking alcohol. · Losing weight, if you are overweight, may help reduce attacks of gout.  But do not go on a \"crash diet. \" Losing a lot of weight in a short amount of time can cause a gout attack. When should you call for help? Call your doctor now or seek immediate medical care if:  · You have a fever. · The joint is so painful you cannot use it. · You have sudden, unexplained swelling, redness, warmth, or severe pain in one or more joints. Watch closely for changes in your health, and be sure to contact your doctor if:  · You have joint pain. · Your symptoms get worse or are not improving after 2 or 3 days. Where can you learn more? Go to http://ladarius-pavel.info/  Enter E531 in the search box to learn more about \"Gout: Care Instructions. \"  Current as of: December 9, 2019               Content Version: 12.5  © 6171-1981 [employer]. Care instructions adapted under license by [employer] (which disclaims liability or warranty for this information). If you have questions about a medical condition or this instruction, always ask your healthcare professional. [place] [address] any warranty or liability for your use of this information. Patient Education        Hand Pain: Care Instructions  Your Care Instructions     Common causes of hand pain are overuse and injuries, such as might happen during sports or home repair projects. Everyday wear and tear, especially as you get older, also can cause hand pain. Most minor hand injuries will heal on their own, and home treatment is usually all you need to do. If you have sudden and severe pain, you may need tests and treatment. Follow-up care is a key part of your treatment and safety. Be sure to make and go to all appointments, and call your doctor if you are having problems. It's also a good idea to know your test results and keep a list of the medicines you take. How can you care for yourself at home? · Take pain medicines exactly as directed.   ? If the doctor gave you a prescription medicine for pain, take it as prescribed. ? If you are not taking a prescription pain medicine, ask your doctor if you can take an over-the-counter medicine. · Rest and protect your hand. Take a break from any activity that may cause pain. · Put ice or a cold pack on your hand for 10 to 20 minutes at a time. Put a thin cloth between the ice and your skin. · Prop up the sore hand on a pillow when you ice it or anytime you sit or lie down during the next 3 days. Try to keep it above the level of your heart. This will help reduce swelling. · If your doctor recommends a sling, splint, or elastic bandage to support your hand, wear it as directed. When should you call for help? [id number] anytime you think you may need emergency care. For example, call if:  · Your hand turns cool or pale or changes color. Call your doctor now or seek immediate medical care if:  · You cannot move your hand. · Your hand pops, moves out of its normal position, and then returns to its normal position. · You have signs of infection, such as:  ? Increased pain, swelling, warmth, or redness. ? Red streaks leading from the sore area. ? Pus draining from a place on your hand. ? A fever. · Your hand feels numb or tingly. Watch closely for changes in your health, and be sure to contact your doctor if:  · Your hand feels unstable when you try to use it. · You do not get better as expected. · You have any new symptoms, such as swelling. · Bruises from an injury to your hand last longer than 2 weeks. Where can you learn more? Go to http://ladarius-pavel.info/  Enter R273 in the search box to learn more about \"Hand Pain: Care Instructions. \"  Current as of: June 26, 2019               Content Version: 12.5  © 9340-7255 [employer]. Care instructions adapted under license by [employer] (which disclaims liability or warranty for this information).  If you have questions about a medical condition or this instruction, always ask your healthcare professional. [place] [address] any warranty or liability for your use of this information.

## 2020-07-28 NOTE — ED NOTES
Pt asked if he is taking his diabetes medicine and states he ate 2 brownies earlier tonight and was supposed to dose himself but he forgot.

## 2020-07-28 NOTE — ED NOTES
Dr. Nancy Orozco placed a fiberglass splint on rt hand and pt tolerated well. Cap refill<3 seconds before and after splint placement. Pt discharged home with written and verbal instructions given by Dr. Nancy Orozco and patient ambulated to lobby without difficulty and pt's mother driving him home.

## 2020-07-31 NOTE — ED PROVIDER NOTES
EMERGENCY DEPARTMENT HISTORY AND PHYSICAL EXAM    Please note that this dictation was completed with Welcome Real-time, the computer voice recognition software. Quite often unanticipated grammatical, syntax, homophones, and other interpretive errors are inadvertently transcribed by the computer software. Please disregard these errors. Please excuse any errors that have escaped final proofreading. Date: 7/28/2020  Patient Name: Carline Barriga  Patient Age and Sex: 38 y.o. male    History of Presenting Illness     Chief Complaint   Patient presents with    Hand Swelling     right hand swelling and pain x several weeks. Denies being bit or scratched by anything. Denies injury       History Provided By: Patient    HPI: Carline Barriga, is a 38 y.o. male whose medical history is noted below presents to the ED with pain and swelling in his right hand and wrist. Has had remote carpal tunnel surgery there previously. 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 Worse with flexing fingers. Better when hand held at rest.  Has not tried anything yet for the symptoms. Pain initially mild to moderate, became severe today. No fevers or chills. Pt denies any other alleviating or exacerbating factors. There are no other complaints, changes or physical findings at this time.      PCP: Manpreet Campa MD    Past History   Past Medical History:  Past Medical History:   Diagnosis Date    Anxiety     Chronic headaches 2007    Depression     Diabetes ([facility] [address]     GERD (gastroesophageal reflux disease)     Hypercholesterolemia     Hypertension     Thyroid disease     hypothyroid    Unspecified sleep apnea     does not use CPAP       Past Surgical History:  Past Surgical History:   Procedure Laterality Date    HX CHOLECYSTECTOMY  8/26/14    Dr Lyles Netrene    HX HEENT      wisdom teeth removed    HX ORTHOPAEDIC Left 2012    carpel tunnel       Family History:  Family History   Problem Relation Age of Onset    Diabetes Mother     Heart Disease Father     Cancer Father     Diabetes Father     Diabetes Paternal Aunt     Diabetes Maternal Grandmother     Thyroid Cancer Maternal Grandmother     Kidney Disease Maternal Grandmother     Arthritis Maternal Grandmother     Heart Disease Maternal Grandfather     High Cholesterol Maternal Grandfather     Hypertension Maternal Grandfather     Diabetes Paternal Grandmother     Hemophilia Paternal Grandfather        Social History:  Social History     Tobacco Use    Smoking status: Former Smoker     Packs/day: 0.00     Years: 14.00     Pack years: 0.00     Last attempt to quit: 2014     Years since quittin.5    Smokeless tobacco: Never Used   Substance Use Topics    Alcohol use: No    Drug use: No       Allergies: Allergies   Allergen Reactions    Morphine Nausea and Vomiting    Penicillin G Swelling    Percocet [Oxycodone-Acetaminophen] Hives and Nausea and Vomiting    Sulfa (Sulfonamide Antibiotics) Swelling    Tramadol Nausea Only     Nausea and headache         Review of Systems     Constitutional: No fever, normal appetite  Skin: No rash, no color change  HEENT: No nasal congestion  Resp: No cough or SOB  CV: No chest pain, no palpitations  GI: No vomiting or diarrhea  : No dysuria or hematuria  MSK: right hand pain, no other joint pain or swelling. Full rom. Neuro: No numbness or focal weakness  Psych: Not suicidal/homicidal    Physical Exam     General: alert, No acute distress  Eyes: EOMI, normal conjunctiva, PERRLA  ENT: moist mucous membranes  Neck: Active, full ROM of neck   Skin: No rashes, no ecchymosis or wonds           Lungs: Equal chest expansion. No respiratory distress. Lungs clear to auscultation. Heart: Regular rate and rhythm. Equal and normal pulses in all extremities, no peripheral edema    Abd:  non distended, soft, not tender  MSK: Full, active ROM in all 4 extremities, no midline back pain. tenderness and mild swelling around right MCP joint of fingers 2-4. Referred pain to mid-hand and wrist although no swelling there and pain is less compared to MCP joints. Normal cap refill in all fingers. No finger swelling. No erythema, induration, fluctuance. Normal  strength but flexing fingers  Limited by pain. Neuro: alert and oriented at baseline, normal speech; no unilateral or focal weakness  Psych: Cooperative with exam; Appropriate mood and affect          Diagnostic Study Results     Labs - I have personally reviewed and interpreted all laboratory results. Trenton Gerber MD, MSc  Recent Results (from the past 100 hour(s))   CBC WITH AUTOMATED DIFF    Collection Time: 07/28/20  1:19 AM   Result Value Ref Range    WBC 7.4 4.1 - 11.1 K/uL    RBC 4.87 4.10 - 5.70 M/uL    HGB 13.8 12.1 - 17.0 g/dL    HCT 40.4 36.6 - 50.3 %    MCV 83.0 80.0 - 99.0 FL    MCH 28.3 26.0 - 34.0 PG    MCHC 34.2 30.0 - 36.5 g/dL    RDW 13.7 11.5 - 14.5 %    PLATELET 845 355 - 502 K/uL    MPV 10.7 8.9 - 12.9 FL    NRBC 0.0 0  WBC    ABSOLUTE NRBC 0.00 0.00 - 0.01 K/uL    NEUTROPHILS 54 32 - 75 %    LYMPHOCYTES 33 12 - 49 %    MONOCYTES 7 5 - 13 %    EOSINOPHILS 4 0 - 7 %    BASOPHILS 1 0 - 1 %    IMMATURE GRANULOCYTES 1 (H) 0.0 - 0.5 %    ABS. NEUTROPHILS 4.1 1.8 - 8.0 K/UL    ABS. LYMPHOCYTES 2.4 0.8 - 3.5 K/UL    ABS. MONOCYTES 0.5 0.0 - 1.0 K/UL    ABS. EOSINOPHILS 0.3 0.0 - 0.4 K/UL    ABS. BASOPHILS 0.1 0.0 - 0.1 K/UL    ABS. IMM.  GRANS. 0.1 (H) 0.00 - 0.04 K/UL    DF AUTOMATED     METABOLIC PANEL, BASIC    Collection Time: 07/28/20  1:19 AM   Result Value Ref Range    Sodium 134 (L) 136 - 145 mmol/L    Potassium 4.3 3.5 - 5.1 mmol/L    Chloride 101 97 - 108 mmol/L    CO2 26 21 - 32 mmol/L    Anion gap 7 5 - 15 mmol/L    Glucose 397 (H) 65 - 100 mg/dL    BUN 17 6 - 20 MG/DL    Creatinine 1.24 0.70 - 1.30 MG/DL    BUN/Creatinine ratio 14 12 - 20      GFR est AA >60 >60 ml/min/1.73m2    GFR est non-AA >60 >60 ml/min/1.73m2    Calcium 8.8 8.5 - 10.1 MG/DL   URIC ACID    Collection Time: 07/28/20  1:19 AM   Result Value Ref Range    Uric acid 6.1 3.5 - 7.2 MG/DL       Radiologic Studies - I have personally reviewed and interpreted all imaging studies and agree with radiology interpretation and report. - Brian Meyer MD, MSc  XR HAND RT MIN 3 V   Final Result   IMPRESSION: Diffuse soft tissue swelling in the first web space. No significant   osseous abnormality. Medical Decision Making   I am the first provider for this patient. Records Reviewed: I reviewed our electronic medical record system for any past medical records that were available that may contribute to the patient's current condition, including their PMH, surgical history, social and family history. Reviewed the nursing notes and vital signs from today's visit. Nursing notes will be reviewed as they become available in realtime while the pt has been in the ED. Brian Meyer MD Msc    Vital Signs-Reviewed the patient's vital signs. Provider Notes (Medical Decision Making):   Overall well-appearing male presents with tenderness and swelling most notable around right 2nd and 3rd mcp joint. Has full rom in hand. No erythema. No tenderness along flexor tendons. Fingers are not swollen. Ddx: gout, arthritis, fracture, unlikely septic arthritis given exam. Also low concern for tendon inflammation or infection as thendons are not tender. Plan: pain control, labs to assess wbc, hand xray    ED Course:   Initial assessment performed. The patients presenting problems have been discussed, and they are in agreement with the care plan formulated and outlined with them. I have encouraged them to ask questions as they arise throughout their visit. On my interpretation of the patient's laboratory studies: no leukocytosis which combined with low clinical concern for cellulitis is reassuring that this is not infectious process.   Uric acid not elevated, however, absence of high uric acid does not rule out gout. On my interpretation of the patient's imaging studies ; no fracture on right hand xray    Impression: likely gout as history fits this sort of onset as does his exam. No sufficient joint effusion to tap but will treat empirically. Yovanny also place hand in splint for comfort. pateint has hand surgeon, will call later today to schedule follow up. Procedure Note - Splint Placement:  2:23 AM  Performed by: lida  Neurovascularly intact prior to tx. An Orthoglass and and wrist splint was placed on pt's right hand. Joint was placed in neutral position. Neurovascularly intact after tx. The procedure took 16-30 minutes, and pt tolerated well. Progress note:  Patient has been reassessed and reports feeling considerably better, has normal vital signs and feels comfortable going home. I think this is reasonable as no findings today suggest a life-threatening condition. DISPOSITION: DISCHARGE  The patient's results have been reviewed with patient and available family and/or caregiver. They verbally convey their understanding and agreement of the patient's signs, symptoms, diagnosis, treatment and prognosis and additionally agree to follow up as recommended in the discharge instructions or to return to the Emergency Department should the patient's condition change prior to their follow-up appointment. The patient and available family and/or caregiver verbally agree with the care plan and all of their questions have been answered. The discharge instructions have also been provided to the them with educational information regarding the patient's diagnosis as well a list of reasons why the patient would want to return to the ER prior to their follow-up appointment should any concerns arise, the patient's condition change or symptoms worsen.     Eric Mike MD, Msc    PLAN:  Discharge Medication List as of 7/28/2020  3:45 AM      START taking these medications    Details   predniSONE (DELTASONE) 50 mg tablet Take 1 Tab by mouth daily for 5 days. , Normal, Disp-5 Tab,R-0      naproxen (NAPROSYN) 500 mg tablet Take 1 Tab by mouth two (2) times daily (with meals) for 3 days. , Normal, Disp-6 Tab,R-0      HYDROcodone-acetaminophen (Norco)  mg tablet Take 2 Tabs by mouth every six (6) hours as needed for Pain for up to 3 days. Max Daily Amount: 8 Tabs., Normal, Disp-20 Tab,R-0         CONTINUE these medications which have NOT CHANGED    Details   lisinopriL (PRINIVIL, ZESTRIL) 10 mg tablet Take 2 Tabs by mouth daily. TAKE 1 TABLET BY MOUTH TWICE DAILY, Normal, Disp-60 Tab,R-2      !! cyclobenzaprine (FLEXERIL) 10 mg tablet Take 1 Tab by mouth three (3) times daily as needed for Muscle Spasm(s). , Normal, Disp-20 Tab, R-0      clomiPHENE (CLOMID) 50 mg tablet One pill every other day, Normal, Disp-15 Tab, R-5      insulin regular (NOVOLIN R, HUMULIN R) 100 unit/mL injection 20 with breakfast and 40 units with dinner plus correction. 160 units per day max, Normal, Disp-3 Vial, R-12      insulin NPH (NovoLIN N NPH U-100 Insulin) 100 unit/mL injection 70 units twice a day, Normal, Disp-4 Vial, R-12      LORazepam (ATIVAN) 1 mg tablet Take 1 Tab by mouth every four (4) hours as needed for Anxiety. Max Daily Amount: 6 mg., Normal, Disp-30 Tab, R-5      busPIRone (BUSPAR) 15 mg tablet Take 1 Tab by mouth two (2) times a day., Normal, Disp-60 Tab, R-5      sertraline (ZOLOFT) 100 mg tablet Take 1 Tab by mouth daily. , Normal, Disp-30 Tab, R-5Please consider 90 day supplies to promote better adherence      levothyroxine (SYNTHROID) 112 mcg tablet TAKE 1 TABLET BY MOUTH ONCE DAILY BEFORE BREAKFAST, Normal, Disp-90 Tab, R-3      rosuvastatin (CRESTOR) 20 mg tablet Take 1 tablet by mouth nightly, Normal, Disp-30 Tab, R-3      aluminum & magnesium hydroxide-simethicone (Maalox Maximum Strength) 400-400-40 mg/5 mL suspension Take 10 mL by mouth every six (6) hours as needed for Indigestion. , Historical Med      metFORMIN (GLUCOPHAGE) 500 mg tablet TAKE 1 TABLET BY MOUTH TWICE DAILY WITH MEALS, Historical Med      !! cyclobenzaprine (FLEXERIL) 10 mg tablet Take 1 Tab by mouth three (3) times daily as needed for Muscle Spasm(s). , Normal, Disp-12 Tab, R-0      Omeprazole delayed release (PRILOSEC D/R) 20 mg tablet Take 1 Tab by mouth daily. , Normal, Disp-20 Tab, R-0      clopidogrel (PLAVIX) 75 mg tab TAKE 1 TABLET BY MOUTH ONCE DAILY, Historical Med      butalbital-acetaminophen-caffeine (FIORICET, ESGIC) -40 mg per tablet TAKE 1 TABLET BY MOUTH AT THE ONSET OF HEADACHE. CAN TAKE TWICE DAILY BUT NOT MORE THAN 10 DAYS/MONTH.ONLY TO BE FILLED IN 1 MONTH INTERVALS, Normal, Disp-20 Tab, R-0      ibuprofen (MOTRIN) 600 mg tablet Take 1 Tab by mouth every six (6) hours as needed for Pain., Normal, Disp-20 Tab, R-0      nitroglycerin (NITROSTAT) 0.4 mg SL tablet Take 1 Tab by mouth every five (5) minutes as needed for Chest Pain. Sit down then put one tab under the tongue every 5 minutes as needed, Normal, Disp-1 Bottle, R-0      metoprolol succinate (TOPROL-XL) 25 mg XL tablet Take 1 Tab by mouth daily. , Print, Disp-90 Tab, R-3      cholecalciferol, VITAMIN D3, (VITAMIN D3) 5,000 unit tab tablet Take 1 Tab by mouth daily. , Print, Disp-90 Tab, R-1      aspirin delayed-release 81 mg tablet Take 1 Tab by mouth daily. , Print, Disp-30 Tab, R-0       !! - Potential duplicate medications found. Please discuss with provider. 1.   2.     Follow-up Information     Follow up With Specialties Details Why Contact Info    Your hand surgeon  Call today Please see your hand surgeon as soon as able, preferably this week. 3.   Return to ED if worse       IJuwan MD, am the attending of record for this patient encounter. Diagnosis     Clinical Impression:   1. Pain of hand, unspecified laterality    2.  Acute idiopathic gout, unspecified site        Attestation:  I personally performed the services described in this documentation on this date 7/28/2020 for patient Rina Goodpasture.   Marcela Silva MD

## 2020-08-26 ENCOUNTER — VIRTUAL VISIT (OUTPATIENT)
Dept: ENDOCRINOLOGY | Age: 40
End: 2020-08-26

## 2020-08-26 DIAGNOSIS — E10.9 TYPE 1 DIABETES MELLITUS WITHOUT COMPLICATION (HCC): Primary | ICD-10-CM

## 2020-08-26 DIAGNOSIS — E03.9 ACQUIRED HYPOTHYROIDISM: ICD-10-CM

## 2020-08-26 DIAGNOSIS — E29.1 HYPOGONADISM IN MALE: ICD-10-CM

## 2020-08-26 DIAGNOSIS — E78.2 MIXED HYPERLIPIDEMIA: ICD-10-CM

## 2020-08-26 PROCEDURE — 99214 OFFICE O/P EST MOD 30 MIN: CPT | Performed by: INTERNAL MEDICINE

## 2020-08-26 NOTE — PROGRESS NOTES
Chief Complaint   Patient presents with    Diabetes     Doxy: [phone] send txt message at 11:15. No recent labs    Other     Pharmacy; [id number]            **THIS IS A VIRTUAL VISIT VIA A VIDEO ENCOUNTER. PATIENT AGREED TO HAVE THEIR CARE DELIVERED OVER VIDEO IN PLACE OF THEIR REGULARLY SCHEDULED OFFICE VISIT**      History of Present Illness: Sergei Flores is a 38 y.o. male here for follow up of diabetes. 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 Julia Diaz of Cardiology. He was also in the ED in January 2020 with issues of N/V. He was tested for influenza and it was negative. For his issues of vertigo, and dizziness he is followed by Dr. Fernie Hernandez of Neurology. At our visit in April 2020 I increased his insulin regimen, he was no longer having issues of hypoglycemia, but he was having frequent hyperglycemia. Pt instructed to continue the NPH 70 units in the AM and 70 units HS and to increase his Novolin R to 30 units with breakfast, 30 units with lunch and 30 units with dinner, plus 2:50 correction scale. He is now following with Dr. Micah Bautista of psychiatry for anxiety and depression. Pt reports that his BGs have been \"out of whack, I was doing better, but I got on a steroid when I went to the ED for swelling in my hand. At first they thought it was broken, but the X-rays were negative. They gave me steroids, thinking it could be gout. I am waiting for my insurance to kick in so I can see my hand specialist.\"  He was on the prednisone for a week, in July. He notes that since he has been off the prednisone, his BGs have improved. He notes he is still having high BGs and \"those have been my fault a lot of the time if I miss a shot of inuslin\".     Pt reports that he is taking the NPH 70 units in the AM and 70 units HS and Novoln R 20-30 with breakfast, 14-20 with lunch \"If I eat lunch\" (he notes that if his BGs are in the 200s before he eats he will take 14 units) and 20-30 with dinner. He has been taking 10-20 units of R HS because his BGs have been high. He notes that when he does take his Novolin R his BGs will come down to a better range. He notes his BGs have been in the 300-600s when he checks. He has not been having low BGs. Pt is eating 2-3 meals per day  He has breakfast, around 9AM-Noon, today he had dennis, eggs, [place] toast sticks (no toppings), hash browns and diet soda. He will have lunch 2-3 days per week. Yesterday he had a roast beef and cheese sandwich, fries and diet soda. He notes that he will occasionally snack during the day on crackers, trail mix or chips. He has dinner around 6-9PM, last night he had a steak and fries. He has been having a [place] snack of a ham and cheese sandwich and then going back to bed. Pt has hx of CAD s/p stenting in December 2019. Pt is taking Rosuvastatin 20mg daily. Pt has hx of neuropathy. No hx of nephropathy. Last eye exam was February 2020. No retinopathy. Pt has hx of hypothyroidism, since the age of 11-9 years old. He is currently taking 112mcg daily. He was biochemically euthyroid in October 2019. At our visit in October 2019 we tested his Thyroid labs, which were normal, but he had a very low Testosterone of 199 on 10/22/19 and repeat was 240 in 10/28/19. His FSH, LH and prolactin were unremarkable. Pt opted to  Not start Clomid 50mg every other day. At our last visit we again ordered the clomid. Pt notes that he has been taking the Clomid 50mg every other day. He notes he is still having issues of ED and poor libido. Current Outpatient Medications   Medication Sig    lisinopriL (PRINIVIL, ZESTRIL) 10 mg tablet Take 2 Tabs by mouth daily.  TAKE 1 TABLET BY MOUTH TWICE DAILY    clomiPHENE (CLOMID) 50 mg tablet One pill every other day    insulin regular (NOVOLIN R, HUMULIN R) 100 unit/mL injection 20 with breakfast and 40 units with dinner plus correction. 160 units per day max (Patient taking differently: 20 with breakfast and 30 units with dinner plus correction. 160 units per day max)    insulin NPH (NovoLIN N NPH U-100 Insulin) 100 unit/mL injection 70 units twice a day    LORazepam (ATIVAN) 1 mg tablet Take 1 Tab by mouth every four (4) hours as needed for Anxiety. Max Daily Amount: 6 mg.  busPIRone (BUSPAR) 15 mg tablet Take 1 Tab by mouth two (2) times a day.  sertraline (ZOLOFT) 100 mg tablet Take 1 Tab by mouth daily.  levothyroxine (SYNTHROID) 112 mcg tablet TAKE 1 TABLET BY MOUTH ONCE DAILY BEFORE BREAKFAST    rosuvastatin (CRESTOR) 20 mg tablet Take 1 tablet by mouth nightly    aluminum & magnesium hydroxide-simethicone (Maalox Maximum Strength) 400-400-40 mg/5 mL suspension Take 10 mL by mouth every six (6) hours as needed for Indigestion.  metFORMIN (GLUCOPHAGE) 500 mg tablet TAKE 1 TABLET BY MOUTH TWICE DAILY WITH MEALS    cyclobenzaprine (FLEXERIL) 10 mg tablet Take 1 Tab by mouth three (3) times daily as needed for Muscle Spasm(s).  Omeprazole delayed release (PRILOSEC D/R) 20 mg tablet Take 1 Tab by mouth daily.  clopidogrel (PLAVIX) 75 mg tab TAKE 1 TABLET BY MOUTH ONCE DAILY    butalbital-acetaminophen-caffeine (FIORICET, ESGIC) -40 mg per tablet TAKE 1 TABLET BY MOUTH AT THE ONSET OF HEADACHE. CAN TAKE TWICE DAILY BUT NOT MORE THAN 10 DAYS/MONTH.ONLY TO BE FILLED IN 1 MONTH INTERVALS    ibuprofen (MOTRIN) 600 mg tablet Take 1 Tab by mouth every six (6) hours as needed for Pain.  nitroglycerin (NITROSTAT) 0.4 mg SL tablet Take 1 Tab by mouth every five (5) minutes as needed for Chest Pain. Sit down then put one tab under the tongue every 5 minutes as needed    metoprolol succinate (TOPROL-XL) 25 mg XL tablet Take 1 Tab by mouth daily.  (Patient taking differently: Take 25 mg by mouth two (2) times a day.)    cholecalciferol, VITAMIN D3, (VITAMIN D3) 5,000 unit tab tablet Take 1 Tab by mouth daily.  aspirin delayed-release 81 mg tablet Take 1 Tab by mouth daily. No current facility-administered medications for this visit. Allergies   Allergen Reactions    Morphine Nausea and Vomiting    Penicillin G Swelling    Percocet [Oxycodone-Acetaminophen] Hives and Nausea and Vomiting    Sulfa (Sulfonamide Antibiotics) Swelling    Tramadol Nausea Only     Nausea and headache       Review of Systems:  - Eyes: no blurry vision or double vision  - Cardiovascular: no chest pain  - Respiratory: no shortness of breath  - Musculoskeletal: no myalgias  - Neurological: no numbness/tingling in extremities    Physical Examination:  There were no vitals taken for this visit.   - GENERAL: NCAT, Appears well nourished   - EYES: EOMI, non-icteric, no proptosis   - Ear/Nose/Throat: NCAT, no visible inflammation or masses   - CARDIOVASCULAR: no cyanosis, no visible JVD   - RESPIRATORY: respiratory effort normal without any distress or labored breathing   - MUSCULOSKELETAL: Normal ROM of neck and upper extremities observed   - SKIN: No rash on face   - NEUROLOGIC:  No facial asymmetry (Cranial nerve 7 motor function), No gaze palsy   - PSYCHIATRIC: Normal affect, Normal insight and judgement           Data Reviewed:   Component      Latest Ref Rng & Units 7/28/2020 7/28/2020 7/28/2020           1:19 AM  1:19 AM  1:19 AM   WBC      4.1 - 11.1 K/uL   7.4   RBC      4.10 - 5.70 M/uL   4.87   HGB      12.1 - 17.0 g/dL   13.8   HCT      36.6 - 50.3 %   40.4   MCV      80.0 - 99.0 FL   83.0   MCH      26.0 - 34.0 PG   28.3   MCHC      30.0 - 36.5 g/dL   34.2   RDW      11.5 - 14.5 %   13.7   PLATELET      127 - 784 K/uL   186   MPV      8.9 - 12.9 FL   10.7   NRBC      0  WBC   0.0   ABSOLUTE NRBC      0.00 - 0.01 K/uL   0.00   NEUTROPHILS      32 - 75 %   54   LYMPHOCYTES      12 - 49 %   33   MONOCYTES      5 - 13 %   7   EOSINOPHILS      0 - 7 %   4   BASOPHILS      0 - 1 %   1   IMMATURE GRANULOCYTES      0.0 - 0.5 %   1 (H)   ABS. NEUTROPHILS      1.8 - 8.0 K/UL   4.1   ABS. LYMPHOCYTES      0.8 - 3.5 K/UL   2.4   ABS. MONOCYTES      0.0 - 1.0 K/UL   0.5   ABS. EOSINOPHILS      0.0 - 0.4 K/UL   0.3   ABS. BASOPHILS      0.0 - 0.1 K/UL   0.1   ABS. IMM. GRANS.      0.00 - 0.04 K/UL   0.1 (H)   DF         AUTOMATED   Sodium      136 - 145 mmol/L 134 (L)     Potassium      3.5 - 5.1 mmol/L 4.3     Chloride      97 - 108 mmol/L 101     CO2      21 - 32 mmol/L 26     Anion gap      5 - 15 mmol/L 7     Glucose      65 - 100 mg/dL 397 (H)     BUN      6 - 20 MG/DL 17     Creatinine      0.70 - 1.30 MG/DL 1.24     BUN/Creatinine ratio      12 - 20   14     GFR est AA      >60 ml/min/1.73m2 >60     GFR est non-AA      >60 ml/min/1.73m2 >60     Calcium      8.5 - 10.1 MG/DL 8.8     Uric acid      3.5 - 7.2 MG/DL  6.1        Assessment/Plan:   1) DM > His BGs remain elevated, though he notes he has been missing doses. He has had some lower BGs in the afternoon and his evening BGs are consistently high. Will have pt continue the NPH 70 units in the AM and 70 units HS and increase his Novolin R to 40 units with breakfast, 30 units with lunch and 40 units with dinner  plus 2:50 for BG > 150. Pt to check his BGs 4 times per day and mail his BG logs to me in 2 weeks. Will order a A1C    2) Hypothyroidism > Pt is euthyroid on LT4 112mcg daily. Will order TFTs and adjust his dose as needed. 3) Hypogonadism > He has been taking the Clomid, but notes his ED and poor libido have not improved. Will order a testosterone level. 4) HLD > Pt to continue the Rosuvastatin 20mg daily. Will order a lipid panel. Pt voices understanding and agreement with the plan.   Pain noted and pt was recommended to call his PCP for further evaluation and treatment, as needed    RTC 3 months      Copy sent to:  Dr. Romulo Cheema

## 2020-08-28 RX ORDER — ROSUVASTATIN CALCIUM 20 MG/1
TABLET, COATED ORAL
Qty: 30 TAB | Refills: 0 | Status: SHIPPED | OUTPATIENT
Start: 2020-08-28 | End: 2020-10-02

## 2020-09-28 DIAGNOSIS — E10.42 TYPE 1 DIABETES MELLITUS WITH DIABETIC POLYNEUROPATHY (HCC): ICD-10-CM

## 2020-09-28 NOTE — TELEPHONE ENCOUNTER
----- Message from [facility] sent at 9/28/2020 11:06 AM EDT -----  Regarding: /Refill  Medication Refill    Caller (if not patient):      Relationship of caller (if not patient):      Best contact number(s):[phone]      Name of medication and dosage if known:2 insulin long term and short term (pt do not know the name of the medication )      Is patient out of this medication (yes/no):yes      Pharmacy name:[facility]    Pharmacy listed in chart? (yes/no):yes  Pharmacy phone [phone]      Details to clarify the request:Pt requesting a refill .       [facility]

## 2020-10-02 RX ORDER — ROSUVASTATIN CALCIUM 20 MG/1
TABLET, COATED ORAL
Qty: 30 TAB | Refills: 0 | Status: ON HOLD | OUTPATIENT
Start: 2020-10-02 | End: 2021-10-31 | Stop reason: SDUPTHER

## 2020-10-07 ENCOUNTER — TELEPHONE (OUTPATIENT)
Dept: ENDOCRINOLOGY | Age: 40
End: 2020-10-07

## 2020-10-07 NOTE — TELEPHONE ENCOUNTER
Insurance will not cover Humulin N. Insurance prefers Lantus. If OK to switch, please send rx to pharmacy. Thank you.

## 2020-10-08 RX ORDER — INSULIN GLARGINE 100 [IU]/ML
INJECTION, SOLUTION SUBCUTANEOUS
Qty: 4 VIAL | Refills: 0 | Status: SHIPPED | OUTPATIENT
Start: 2020-10-08 | End: 2020-11-23

## 2020-10-08 NOTE — TELEPHONE ENCOUNTER
Please let him know I wrote for lantus vial to replace the NPH and he will take the same dose of 70 units twice daily. Sometimes lantus is stronger than NPH and if he is having 2 or more readings under 90 with switching to lantus, he should decrease his dose to 66 units twice daily. This will be ready for  at the pharmacy today.

## 2020-10-08 NOTE — TELEPHONE ENCOUNTER
Patient has been advised and expressed understanding. He just picked up a vial of NPH. Advised to finish up the NPH. Patient agreed.

## 2020-10-20 DIAGNOSIS — I10 HYPERTENSION GOAL BP (BLOOD PRESSURE) < 130/80: ICD-10-CM

## 2020-10-20 RX ORDER — LISINOPRIL 10 MG/1
20 TABLET ORAL DAILY
Qty: 60 TAB | Refills: 0 | Status: SHIPPED | OUTPATIENT
Start: 2020-10-20 | End: 2021-08-17 | Stop reason: DRUGHIGH

## 2020-10-25 ENCOUNTER — HOSPITAL ENCOUNTER (EMERGENCY)
Age: 40
Discharge: HOME | End: 2020-10-25
Attending: EMERGENCY MEDICINE
Payer: MEDICAID

## 2020-10-25 VITALS
TEMPERATURE: 98 F | SYSTOLIC BLOOD PRESSURE: 123 MMHG | RESPIRATION RATE: 16 BRPM | OXYGEN SATURATION: 92 % | DIASTOLIC BLOOD PRESSURE: 65 MMHG | HEART RATE: 79 BPM | BODY MASS INDEX: 38.51 KG/M2 | WEIGHT: 260.8 LBS

## 2020-10-25 DIAGNOSIS — M54.12 CERVICAL RADICULOPATHY: Primary | ICD-10-CM

## 2020-10-25 DIAGNOSIS — M54.2 ACUTE NECK PAIN: ICD-10-CM

## 2020-10-25 PROCEDURE — 99284 EMERGENCY DEPT VISIT MOD MDM: CPT

## 2020-10-25 PROCEDURE — 74011250637 HC RX REV CODE- 250/637: Performed by: PHYSICIAN ASSISTANT

## 2020-10-25 RX ORDER — CYCLOBENZAPRINE HCL 10 MG
10 TABLET ORAL
Status: COMPLETED | OUTPATIENT
Start: 2020-10-25 | End: 2020-10-25

## 2020-10-25 RX ORDER — CYCLOBENZAPRINE HCL 10 MG
10 TABLET ORAL
Qty: 20 TAB | Refills: 0 | OUTPATIENT
Start: 2020-10-25 | End: 2020-12-21

## 2020-10-25 RX ORDER — HYDROCODONE BITARTRATE AND ACETAMINOPHEN 5; 325 MG/1; MG/1
1 TABLET ORAL
Qty: 15 TAB | Refills: 0 | Status: SHIPPED | OUTPATIENT
Start: 2020-10-25 | End: 2020-10-30

## 2020-10-25 RX ORDER — HYDROCODONE BITARTRATE AND ACETAMINOPHEN 5; 325 MG/1; MG/1
2 TABLET ORAL
Status: COMPLETED | OUTPATIENT
Start: 2020-10-25 | End: 2020-10-25

## 2020-10-25 RX ADMIN — CYCLOBENZAPRINE 10 MG: 10 TABLET, FILM COATED ORAL at 17:10

## 2020-10-25 RX ADMIN — HYDROCODONE BITARTRATE AND ACETAMINOPHEN 2 TABLET: 5; 325 TABLET ORAL at 17:10

## 2020-10-25 NOTE — ED TRIAGE NOTES
History of slipped disk in his neck. Complaints of neck pain for 1 week but now pain radiating down his back and into his arms. Also complaints of arm pain with numbness and tingling into arms.

## 2020-10-25 NOTE — ED NOTES
Pt presents from triage w/ Hx of c5 and c6 injury. Pt c/o neck pain pain down both arms, left shoulder pain, head ache, nausea.

## 2020-10-25 NOTE — ED PROVIDER NOTES
EMERGENCY DEPARTMENT HISTORY AND PHYSICAL EXAM      Date: 10/25/2020  Patient Name: Alta Fishman    History of Presenting Illness     Chief Complaint   Patient presents with    Neck Pain    Back Pain    Headache       History Provided By: Patient    HPI: Alta Fishman, 38 y.o. male history of diabetes, chronic headaches, chronic neck pain, hypertension and others as below presents ambulatory with his mother to the ED with cc of a couple of days of 8 out of 10 constant, aching posterior neck pain for which he found some, but no lasting improvement with his prescribed lorazepam.  He tells me he has a bulging disc at C5-C6 and has seen Dr. Ruddy Rai MD with Ortho/Spine and was scheduled for an anterior cervical discectomy this past December, however the procedure was canceled due to his heart attack in November 2019. Has been no new injury. There has been no fever recently. There are no other complaints, changes, or physical findings at this time. PCP: Hillary Salinas MD    Current Outpatient Medications   Medication Sig Dispense Refill    HYDROcodone-acetaminophen (NORCO) 5-325 mg per tablet Take 1 Tab by mouth every eight (8) hours as needed for Pain for up to 5 days. Max Daily Amount: 3 Tabs. 15 Tab 0    cyclobenzaprine (FLEXERIL) 10 mg tablet Take 1 Tab by mouth three (3) times daily as needed for Muscle Spasm(s). 20 Tab 0    lisinopriL (PRINIVIL, ZESTRIL) 10 mg tablet Take 2 Tabs by mouth daily. 60 Tab 0    insulin glargine (LANTUS) 100 unit/mL injection Inject 70 units in the morning and at night--replaces NPH 4 Vial 0    rosuvastatin (CRESTOR) 20 mg tablet Take 1 tablet by mouth nightly 30 Tab 0    insulin regular (NOVOLIN R, HUMULIN R) 100 unit/mL injection 20-38 units with breakfast and 20-38 units with dinner plus correction.  160 units per day max 5 Vial 3    metFORMIN (GLUCOPHAGE) 500 mg tablet TAKE 1 TABLET BY MOUTH TWICE DAILY WITH MEALS 180 Tab 3    clomiPHENE (CLOMID) 50 mg tablet One pill every other day 15 Tab 5    LORazepam (ATIVAN) 1 mg tablet Take 1 Tab by mouth every four (4) hours as needed for Anxiety. Max Daily Amount: 6 mg. 30 Tab 5    busPIRone (BUSPAR) 15 mg tablet Take 1 Tab by mouth two (2) times a day. 60 Tab 5    sertraline (ZOLOFT) 100 mg tablet Take 1 Tab by mouth daily. 30 Tab 5    levothyroxine (SYNTHROID) 112 mcg tablet TAKE 1 TABLET BY MOUTH ONCE DAILY BEFORE BREAKFAST 90 Tab 3    aluminum & magnesium hydroxide-simethicone (Maalox Maximum Strength) 400-400-40 mg/5 mL suspension Take 10 mL by mouth every six (6) hours as needed for Indigestion.  cyclobenzaprine (FLEXERIL) 10 mg tablet Take 1 Tab by mouth three (3) times daily as needed for Muscle Spasm(s). 12 Tab 0    Omeprazole delayed release (PRILOSEC D/R) 20 mg tablet Take 1 Tab by mouth daily. 20 Tab 0    clopidogrel (PLAVIX) 75 mg tab TAKE 1 TABLET BY MOUTH ONCE DAILY      butalbital-acetaminophen-caffeine (FIORICET, ESGIC) -40 mg per tablet TAKE 1 TABLET BY MOUTH AT THE ONSET OF HEADACHE. CAN TAKE TWICE DAILY BUT NOT MORE THAN 10 DAYS/MONTH.ONLY TO BE FILLED IN 1 MONTH INTERVALS 20 Tab 0    ibuprofen (MOTRIN) 600 mg tablet Take 1 Tab by mouth every six (6) hours as needed for Pain. 20 Tab 0    nitroglycerin (NITROSTAT) 0.4 mg SL tablet Take 1 Tab by mouth every five (5) minutes as needed for Chest Pain. Sit down then put one tab under the tongue every 5 minutes as needed 1 Bottle 0    metoprolol succinate (TOPROL-XL) 25 mg XL tablet Take 1 Tab by mouth daily. (Patient taking differently: Take 25 mg by mouth two (2) times a day.) 90 Tab 3    cholecalciferol, VITAMIN D3, (VITAMIN D3) 5,000 unit tab tablet Take 1 Tab by mouth daily. 90 Tab 1    aspirin delayed-release 81 mg tablet Take 1 Tab by mouth daily.  30 Tab 0     Past History     Past Medical History:  Past Medical History:   Diagnosis Date    Anxiety     Chronic headaches 2007    Depression     Diabetes ([facility] [address]     GERD (gastroesophageal reflux disease)     Hypercholesterolemia     Hypertension     Thyroid disease     hypothyroid    Unspecified sleep apnea     does not use CPAP       Past Surgical History:  Past Surgical History:   Procedure Laterality Date    HX CHOLECYSTECTOMY  14    Dr Zaina Young    HX HEENT      wisdom teeth removed    HX ORTHOPAEDIC Left 2012    carpel tunnel       Family History:  Family History   Problem Relation Age of Onset    Diabetes Mother     Heart Disease Father     Cancer Father     Diabetes Father     Diabetes Paternal Aunt     Diabetes Maternal Grandmother     Thyroid Cancer Maternal Grandmother     Kidney Disease Maternal Grandmother     Arthritis Maternal Grandmother     Heart Disease Maternal Grandfather     High Cholesterol Maternal Grandfather     Hypertension Maternal Grandfather     Diabetes Paternal Grandmother     Hemophilia Paternal Grandfather        Social History:  Social History     Tobacco Use    Smoking status: Former Smoker     Packs/day: 0.00     Years: 14.00     Pack years: 0.00     Last attempt to quit: 2014     Years since quittin.8    Smokeless tobacco: Never Used   Substance Use Topics    Alcohol use: No    Drug use: No       Allergies: Allergies   Allergen Reactions    Morphine Nausea and Vomiting    Penicillin G Swelling    Percocet [Oxycodone-Acetaminophen] Hives and Nausea and Vomiting    Sulfa (Sulfonamide Antibiotics) Swelling    Tramadol Nausea Only     Nausea and headache       Review of Systems   Review of Systems   Constitutional: Negative for fatigue and fever. HENT: Negative for congestion, ear pain and rhinorrhea. Eyes: Negative for pain and redness. Respiratory: Negative for cough and wheezing. Cardiovascular: Negative for chest pain and palpitations. Gastrointestinal: Negative for abdominal pain, nausea and vomiting. Genitourinary: Negative for dysuria, frequency and urgency.    Musculoskeletal: Negative for back pain, neck pain and neck stiffness. Skin: Negative for rash and wound. Neurological: Positive for headaches. Negative for weakness, light-headedness and numbness. Physical Exam   Physical Exam  Vitals signs and nursing note reviewed. Constitutional:       General: He is not in acute distress. Appearance: He is well-developed. He is obese. He is not toxic-appearing. HENT:      Head: Normocephalic and atraumatic. No right periorbital erythema or left periorbital erythema. Jaw: No trismus. Right Ear: External ear normal.      Left Ear: External ear normal.      Nose: Nose normal.      Mouth/Throat:      Pharynx: Uvula midline. Eyes:      General: No scleral icterus. Conjunctiva/sclera: Conjunctivae normal.      Pupils: Pupils are equal, round, and reactive to light. Neck:      Musculoskeletal: Full passive range of motion without pain and normal range of motion. Comments:   CERVICAL SPINE:  Supple  Full active range of motion of all extremities  No bruising, redness or swelling  Posterior cervical and paracervical soreness on palpation  Cardiovascular:      Rate and Rhythm: Normal rate and regular rhythm. Heart sounds: Normal heart sounds. Pulmonary:      Effort: Pulmonary effort is normal. No tachypnea, accessory muscle usage or respiratory distress. Breath sounds: Normal breath sounds. No decreased breath sounds or wheezing. Abdominal:      Palpations: Abdomen is soft. Abdomen is not rigid. Tenderness: There is no abdominal tenderness. There is no guarding. Musculoskeletal: Normal range of motion. Skin:     Findings: No rash. Neurological:      Mental Status: He is alert and oriented to person, place, and time. He is not disoriented. GCS: GCS eye subscore is 4. GCS verbal subscore is 5. GCS motor subscore is 6. Cranial Nerves: No cranial nerve deficit. Sensory: No sensory deficit.       Comments:   Normal speech  Good facial symmetry  No focal neurological deficits   Psychiatric:         Speech: Speech normal.       Diagnostic Study Results     Labs -   No results found for this or any previous visit (from the past 12 hour(s)). Radiologic Studies -   No orders to display     CT Results  (Last 48 hours)    None        CXR Results  (Last 48 hours)    None        Medical Decision Making   I am the first provider for this patient. I reviewed the vital signs, available nursing notes, past medical history, past surgical history, family history and social history. Vital Signs-Reviewed the patient's vital signs. Patient Vitals for the past 12 hrs:   Temp Pulse Resp BP SpO2   10/25/20 1645 -- -- -- 123/65 92 %   10/25/20 1630 -- -- -- 122/67 93 %   10/25/20 1615 -- -- -- 118/76 95 %   10/25/20 1600 -- -- -- 136/85 94 %   10/25/20 1546 98 °F (36.7 °C) 79 16 -- 98 %       Pulse Oximetry Analysis - 98% on RA    Records Reviewed: Nursing Notes, Old Medical Records, Previous Radiology Studies, Previous Laboratory Studies and     Provider Notes (Medical Decision Making): Afebrile, well appearing, no new injury, presentation reflects an exacerbation of a chronic problem, plan as below     ED Course:   Initial assessment performed. The patients presenting problems have been discussed, and they are in agreement with the care plan formulated and outlined with them. I have encouraged them to ask questions as they arise throughout their visit. Disposition:  Discharge    PLAN:  1. Discharge Medication List as of 10/25/2020  5:05 PM      START taking these medications    Details   HYDROcodone-acetaminophen (NORCO) 5-325 mg per tablet Take 1 Tab by mouth every eight (8) hours as needed for Pain for up to 5 days. Max Daily Amount: 3 Tabs., Normal, Disp-15 Tab,R-0      !! cyclobenzaprine (FLEXERIL) 10 mg tablet Take 1 Tab by mouth three (3) times daily as needed for Muscle Spasm(s). , Normal, Disp-20 Tab,R-0       !! - Potential duplicate medications found. Please discuss with provider. CONTINUE these medications which have NOT CHANGED    Details   lisinopriL (PRINIVIL, ZESTRIL) 10 mg tablet Take 2 Tabs by mouth daily. , Normal, Disp-60 Tab,R-0      insulin glargine (LANTUS) 100 unit/mL injection Inject 70 units in the morning and at night--replaces NPH, Normal, Disp-4 Vial,R-0      rosuvastatin (CRESTOR) 20 mg tablet Take 1 tablet by mouth nightly, Normal, Disp-30 Tab,R-0      insulin regular (NOVOLIN R, HUMULIN R) 100 unit/mL injection 20-38 units with breakfast and 20-38 units with dinner plus correction. 160 units per day max, Normal, Disp-5 Vial,R-3      metFORMIN (GLUCOPHAGE) 500 mg tablet TAKE 1 TABLET BY MOUTH TWICE DAILY WITH MEALS, Normal, Disp-180 Tab,R-3      clomiPHENE (CLOMID) 50 mg tablet One pill every other day, Normal, Disp-15 Tab, R-5      LORazepam (ATIVAN) 1 mg tablet Take 1 Tab by mouth every four (4) hours as needed for Anxiety. Max Daily Amount: 6 mg., Normal, Disp-30 Tab, R-5      busPIRone (BUSPAR) 15 mg tablet Take 1 Tab by mouth two (2) times a day., Normal, Disp-60 Tab, R-5      sertraline (ZOLOFT) 100 mg tablet Take 1 Tab by mouth daily. , Normal, Disp-30 Tab, R-5Please consider 90 day supplies to promote better adherence      levothyroxine (SYNTHROID) 112 mcg tablet TAKE 1 TABLET BY MOUTH ONCE DAILY BEFORE BREAKFAST, Normal, Disp-90 Tab, R-3      aluminum & magnesium hydroxide-simethicone (Maalox Maximum Strength) 400-400-40 mg/5 mL suspension Take 10 mL by mouth every six (6) hours as needed for Indigestion. , Historical Med      !! cyclobenzaprine (FLEXERIL) 10 mg tablet Take 1 Tab by mouth three (3) times daily as needed for Muscle Spasm(s). , Normal, Disp-12 Tab, R-0      Omeprazole delayed release (PRILOSEC D/R) 20 mg tablet Take 1 Tab by mouth daily. , Normal, Disp-20 Tab, R-0      clopidogrel (PLAVIX) 75 mg tab TAKE 1 TABLET BY MOUTH ONCE DAILY, Historical Med      butalbital-acetaminophen-caffeine (FIORICET, ESGIC) -40 mg per tablet TAKE 1 TABLET BY MOUTH AT THE ONSET OF HEADACHE. CAN TAKE TWICE DAILY BUT NOT MORE THAN 10 DAYS/MONTH.ONLY TO BE FILLED IN 1 MONTH INTERVALS, Normal, Disp-20 Tab, R-0      ibuprofen (MOTRIN) 600 mg tablet Take 1 Tab by mouth every six (6) hours as needed for Pain., Normal, Disp-20 Tab, R-0      nitroglycerin (NITROSTAT) 0.4 mg SL tablet Take 1 Tab by mouth every five (5) minutes as needed for Chest Pain. Sit down then put one tab under the tongue every 5 minutes as needed, Normal, Disp-1 Bottle, R-0      metoprolol succinate (TOPROL-XL) 25 mg XL tablet Take 1 Tab by mouth daily. , Print, Disp-90 Tab, R-3      cholecalciferol, VITAMIN D3, (VITAMIN D3) 5,000 unit tab tablet Take 1 Tab by mouth daily. , Print, Disp-90 Tab, R-1      aspirin delayed-release 81 mg tablet Take 1 Tab by mouth daily. , Print, Disp-30 Tab, R-0       !! - Potential duplicate medications found. Please discuss with provider. 2.   Follow-up Information     Follow up With Specialties Details Why Contact Info    Radha Worthington MD Neurosurgery Schedule an appointment as soon as possible for a visit NEUROSURGERY: as needed if symptoms persist [id number] [facility]  [phone]          Return to ED if worse     Diagnosis     Clinical Impression:   1. Cervical radiculopathy    2.  Acute neck pain

## 2020-10-26 ENCOUNTER — HOSPITAL ENCOUNTER (EMERGENCY)
Age: 40
Discharge: STILL ADMITTED OR ADMINISTRATIVE | End: 2020-10-26

## 2020-10-26 ENCOUNTER — PATIENT OUTREACH (OUTPATIENT)
Dept: CASE MANAGEMENT | Age: 40
End: 2020-10-26

## 2020-10-26 NOTE — PROGRESS NOTES
Patient contacted regarding recent discharge and COVID-19 risk. [facility] ED 10/25/20 dx-cervical radiculopathy, acute neck pain (neck & back pain, H/A)    Discussed COVID-19 related testing which was not done at this time. Test results were not done. Patient informed of results, if available? N/a    Pt stated he is feeling a little bit better than yesterday. He has his prescriptions and is waiting for neuro surgeon to call back about an appt. Care Transition Nurse/ Ambulatory Care Manager/ LPN Care Coordinator contacted the patient by telephone to perform post discharge assessment. Verified name and  with patient as identifiers. Patient has following risk factors of: diabetes and CAD, HTN, severe obesity. CTN/ACM/LPN reviewed discharge instructions, medical action plan and red flags related to discharge diagnosis. Reviewed and educated them on any new and changed medications related to discharge diagnosis. Advised obtaining a 90-day supply of all daily and as-needed medications. Advance Care Planning:   Does patient have an Advance Directive: currently not on file; education provided     Education provided regarding infection prevention, and signs and symptoms of COVID-19 and when to seek medical attention with patient who verbalized understanding. Discussed exposure protocols and quarantine from [address] you at higher risk for severe illness 2019 and given an opportunity for questions and concerns. The patient agrees to contact the COVID-19 hotline [phone] or PCP office for questions related to their healthcare. CTN/ACM/LPN provided contact information for future reference. From CDC: Are you at higher risk for severe illness?  Wash your hands often.  Avoid close contact (6 feet, which is about two arm lengths) with people who are sick.  Put distance between yourself and other people if COVID-19 is spreading in your community.    Clean and disinfect frequently touched surfaces.  Avoid all cruise travel and non-essential air travel.  Call your healthcare professional if you have concerns about COVID-19 and your underlying condition or if you are sick. For more information on steps you can take to protect yourself, see CDC's How to Protect Yourself      Patient/family/caregiver given information for GetWell Loop and agrees to enroll yes  Patient's preferred e-mail:  [email or web address]  Patient's preferred phone number: [phone]  Based on Loop alert triggers, patient will be contacted by nurse care manager for worsening symptoms. Pt will be further monitored by COVID Loop Team based on severity of symptoms and risk factors.

## 2020-10-26 NOTE — ACP (ADVANCE CARE PLANNING)
Advance Care Planning:   Does patient have an Advance Directive: currently not on file; education provided       Primary Decision Maker: Lanny Brannon - Mother - [phone]    Secondary Decision Maker: Dilcia Jordan - Girlfriend - [phone]

## 2020-11-20 ENCOUNTER — HOSPITAL ENCOUNTER (EMERGENCY)
Age: 40
Discharge: HOME | End: 2020-11-21
Attending: EMERGENCY MEDICINE
Payer: COMMERCIAL

## 2020-11-20 VITALS
RESPIRATION RATE: 17 BRPM | BODY MASS INDEX: 38.56 KG/M2 | HEART RATE: 85 BPM | SYSTOLIC BLOOD PRESSURE: 153 MMHG | TEMPERATURE: 97.8 F | DIASTOLIC BLOOD PRESSURE: 80 MMHG | OXYGEN SATURATION: 99 % | HEIGHT: 69 IN | WEIGHT: 260.36 LBS

## 2020-11-20 DIAGNOSIS — M54.2 CHRONIC MIDLINE POSTERIOR NECK PAIN: Primary | ICD-10-CM

## 2020-11-20 DIAGNOSIS — G89.29 OTHER CHRONIC PAIN: ICD-10-CM

## 2020-11-20 DIAGNOSIS — G89.29 CHRONIC MIDLINE POSTERIOR NECK PAIN: Primary | ICD-10-CM

## 2020-11-20 DIAGNOSIS — M54.12 CERVICAL RADICULOPATHY: ICD-10-CM

## 2020-11-20 PROCEDURE — 99283 EMERGENCY DEPT VISIT LOW MDM: CPT

## 2020-11-20 PROCEDURE — 96375 TX/PRO/DX INJ NEW DRUG ADDON: CPT

## 2020-11-20 PROCEDURE — 96374 THER/PROPH/DIAG INJ IV PUSH: CPT

## 2020-11-21 PROCEDURE — 74011250636 HC RX REV CODE- 250/636: Performed by: EMERGENCY MEDICINE

## 2020-11-21 PROCEDURE — 96375 TX/PRO/DX INJ NEW DRUG ADDON: CPT

## 2020-11-21 PROCEDURE — 74011250637 HC RX REV CODE- 250/637: Performed by: EMERGENCY MEDICINE

## 2020-11-21 PROCEDURE — 96374 THER/PROPH/DIAG INJ IV PUSH: CPT

## 2020-11-21 PROCEDURE — 74011000250 HC RX REV CODE- 250: Performed by: EMERGENCY MEDICINE

## 2020-11-21 RX ORDER — DIPHENHYDRAMINE HYDROCHLORIDE 50 MG/ML
25 INJECTION, SOLUTION INTRAMUSCULAR; INTRAVENOUS
Status: COMPLETED | OUTPATIENT
Start: 2020-11-21 | End: 2020-11-21

## 2020-11-21 RX ORDER — LIDOCAINE 4 G/100G
1 PATCH TOPICAL
Status: DISCONTINUED | OUTPATIENT
Start: 2020-11-21 | End: 2020-11-21 | Stop reason: HOSPADM

## 2020-11-21 RX ORDER — LIDOCAINE 50 MG/G
PATCH TOPICAL
Qty: 5 EACH | Refills: 0 | OUTPATIENT
Start: 2020-11-21 | End: 2020-12-21

## 2020-11-21 RX ORDER — PREDNISONE 10 MG/1
TABLET ORAL
Qty: 48 TAB | Refills: 0 | OUTPATIENT
Start: 2020-11-21 | End: 2020-12-21

## 2020-11-21 RX ORDER — METOCLOPRAMIDE HYDROCHLORIDE 5 MG/ML
10 INJECTION INTRAMUSCULAR; INTRAVENOUS
Status: COMPLETED | OUTPATIENT
Start: 2020-11-21 | End: 2020-11-21

## 2020-11-21 RX ORDER — DIAZEPAM 5 MG/1
5 TABLET ORAL
Status: COMPLETED | OUTPATIENT
Start: 2020-11-21 | End: 2020-11-21

## 2020-11-21 RX ORDER — DEXAMETHASONE SODIUM PHOSPHATE 4 MG/ML
10 INJECTION, SOLUTION INTRA-ARTICULAR; INTRALESIONAL; INTRAMUSCULAR; INTRAVENOUS; SOFT TISSUE
Status: COMPLETED | OUTPATIENT
Start: 2020-11-21 | End: 2020-11-21

## 2020-11-21 RX ORDER — BUTALBITAL, ACETAMINOPHEN AND CAFFEINE 50; 325; 40 MG/1; MG/1; MG/1
2 TABLET ORAL
Status: COMPLETED | OUTPATIENT
Start: 2020-11-21 | End: 2020-11-21

## 2020-11-21 RX ORDER — DIAZEPAM 5 MG/1
5 TABLET ORAL
Qty: 15 TAB | Refills: 0 | OUTPATIENT
Start: 2020-11-21 | End: 2020-12-21

## 2020-11-21 RX ADMIN — DIAZEPAM 5 MG: 5 TABLET ORAL at 01:04

## 2020-11-21 RX ADMIN — BUTALBITAL, ACETAMINOPHEN, AND CAFFEINE 2 TABLET: 50; 325; 40 TABLET ORAL at 01:04

## 2020-11-21 RX ADMIN — DEXAMETHASONE SODIUM PHOSPHATE 10 MG: 4 INJECTION, SOLUTION INTRAMUSCULAR; INTRAVENOUS at 01:03

## 2020-11-21 RX ADMIN — METOCLOPRAMIDE 10 MG: 5 INJECTION, SOLUTION INTRAMUSCULAR; INTRAVENOUS at 01:04

## 2020-11-21 RX ADMIN — DIPHENHYDRAMINE HYDROCHLORIDE 25 MG: 50 INJECTION, SOLUTION INTRAMUSCULAR; INTRAVENOUS at 01:04

## 2020-11-21 NOTE — DISCHARGE INSTRUCTIONS
Patient Education        Neck Pain: Care Instructions  Your Care Instructions    You can have neck pain anywhere from the bottom of your head to the top of your shoulders. It can spread to the upper back or arms. Injuries, painting a ceiling, sleeping with your neck twisted, staying in one position for too long, and many other activities can cause neck pain. Most neck pain gets better with home care. Your doctor may recommend medicine to relieve pain or relax your muscles. He or she may suggest exercise and physical therapy to increase flexibility and relieve stress. You may need to wear a special (cervical) collar to support your neck for a day or two. Follow-up care is a key part of your treatment and safety. Be sure to make and go to all appointments, and call your doctor if you are having problems. It's also a good idea to know your test results and keep a list of the medicines you take. How can you care for yourself at home? · Try using a heating pad on a low or medium setting for 15 to 20 minutes every 2 or 3 hours. Try a warm shower in place of one session with the heating pad. · You can also try an ice pack for 10 to 15 minutes every 2 to 3 hours. Put a thin cloth between the ice and your skin. · Take pain medicines exactly as directed. ¨ If the doctor gave you a prescription medicine for pain, take it as prescribed. ¨ If you are not taking a prescription pain medicine, ask your doctor if you can take an over-the-counter medicine. · If your doctor recommends a cervical collar, wear it exactly as directed. When should you call for help? Call your doctor now or seek immediate medical care if:  ? · You have new or worsening numbness in your arms, buttocks or legs. ? · You have new or worsening weakness in your arms or legs. (This could make it hard to stand up.)   ? · You lose control of your bladder or bowels. ? Watch closely for changes in your health, and be sure to contact your doctor if:  ? · Your neck pain is getting worse. ? · You are not getting better after 1 week. ? · You do not get better as expected. Where can you learn more? Go to http://www.gray.com/. Enter [id number] in the search box to learn more about \"Neck Pain: Care Instructions. \"  Current as of: March 21, 2017  Content Version: 11.5  © 0994-8392 [employer]. Care instructions adapted under license by [employer] (which disclaims liability or warranty for this information). If you have questions about a medical condition or this instruction, always ask your healthcare professional. [place] [address] any warranty or liability for your use of this information. Patient Education        Neck: Exercises  Introduction  Here are some examples of exercises for you to try. The exercises may be suggested for a condition or for rehabilitation. Start each exercise slowly. Ease off the exercises if you start to have pain. You will be told when to start these exercises and which ones will work best for you. How to do the exercises  Neck stretch   1. This stretch works best if you keep your shoulder down as you lean away from it. To help you remember to do this, start by relaxing your shoulders and lightly holding on to your thighs or your chair. 2. Tilt your head toward your shoulder and hold for 15 to 30 seconds. Let the weight of your head stretch your muscles. 3. If you would like a little added stretch, use your hand to gently and steadily pull your head toward your shoulder. For example, keeping your right shoulder down, lean your head to the left. 4. Repeat 2 to 4 times toward each shoulder. Diagonal neck stretch   1. Turn your head slightly toward the direction you will be stretching, and tilt your head diagonally toward your chest and hold for 15 to 30 seconds.   2. If you would like a little added stretch, use your hand to gently and steadily pull your head forward on the diagonal.  3. Repeat 2 to 4 times toward each side. Dorsal glide stretch   The dorsal glide stretches the back of the neck. If you feel pain, do not glide so far back. Some people find this exercise easier to do while lying on their backs with an ice pack on the neck. 1. Sit or stand tall and look straight ahead. 2. Slowly tuck your chin as you glide your head backward over your body  3. Hold for a count of 6, and then relax for up to 10 seconds. 4. Repeat 8 to 12 times. Chest and shoulder stretch   1. Sit or stand tall and glide your head backward as in the dorsal glide stretch. 2. Raise both arms so that your hands are next to your ears. 3. Take a deep breath, and as you breathe out, lower your elbows down and behind your back. You will feel your shoulder blades slide down and together, and at the same time you will feel a stretch across your chest and the front of your shoulders. 4. Hold for about 6 seconds, and then relax for up to 10 seconds. 5. Repeat 8 to 12 times. Strengthening: Hands on head   1. Move your head backward, forward, and side to side against gentle pressure from your hands, holding each position for about 6 seconds. 2. Repeat 8 to 12 times. Follow-up care is a key part of your treatment and safety. Be sure to make and go to all appointments, and call your doctor if you are having problems. It's also a good idea to know your test results and keep a list of the medicines you take. Where can you learn more? Go to http://www.herrera.com/  Enter P975 in the search box to learn more about \"Neck: Exercises. \"  Current as of: March 2, 2020               Content Version: 12.6  © 9154-9243 [employer]. Care instructions adapted under license by [employer] (which disclaims liability or warranty for this information).  If you have questions about a medical condition or this instruction, always ask your healthcare professional. [address] any warranty or liability for your use of this information. Patient Education        Pinched Nerve in the Neck: Care Instructions  Your Care Instructions  A pinched nerve in the neck happens when a vertebra or disc in the upper part of your spine is damaged. This damage can happen because of an injury. Or it can just happen with age. The changes caused by the damage may put pressure on a nearby nerve root, pinching it. This causes symptoms such as sharp pain in your neck, shoulder, arm, hand, or back. You may also have tingling or numbness. Sometimes it makes your arm weaker. The symptoms are usually worse when you turn your head or strain your neck. For many people, the symptoms get better over time and finally go away. Early treatment usually includes medicines for pain and swelling. Sometimes physical therapy and special exercises may help. Follow-up care is a key part of your treatment and safety. Be sure to make and go to all appointments, and call your doctor if you are having problems. It's also a good idea to know your test results and keep a list of the medicines you take. How can you care for yourself at home? · Be safe with medicines. Read and follow all instructions on the label. ? If the doctor gave you a prescription medicine for pain, take it as prescribed. ? If you are not taking a prescription pain medicine, ask your doctor if you can take an over-the-counter medicine. · Try using a heating pad on a low or medium setting for 15 to 20 minutes every 2 or 3 hours. Try a warm shower in place of one session with the heating pad. You can also buy single-use heat wraps that last up to 8 hours. · You can also try an ice pack for 10 to 15 minutes every 2 to 3 hours. There isn't strong evidence that either heat or ice will help. But you can try them to see if they help you. · Don't spend too long in one position.  Take short breaks to move around and change positions. · Wear a seat belt and shoulder harness when you are in a car. · Sleep with a pillow under your head and neck that keeps your neck straight. · If you were given a neck brace (cervical collar) to limit neck motion, wear it as instructed for as many days as your doctor tells you to. Do not wear it longer than you were told to. Wearing a brace for too long can lead to neck stiffness and can weaken the neck muscles. · Follow your doctor's instructions for gentle neck-stretching exercises. · Do not smoke. Smoking can slow healing of your discs. If you need help quitting, talk to your doctor about stop-smoking programs and medicines. These can increase your chances of quitting for good. · Avoid strenuous work or exercise until your doctor says it is okay. When should you call for help? Call 911 anytime you think you may need emergency care. For example, call if:    · You are unable to move an arm or a leg at all. Call your doctor now or seek immediate medical care if:    · You have new or worse symptoms in your arms, legs, chest, belly, or buttocks. Symptoms may include:  ? Numbness or tingling. ? Weakness. ? Pain.     · You lose bladder or bowel control. Watch closely for changes in your health, and be sure to contact your doctor if:    · You are not getting better as expected. Where can you learn more? Go to http://www.gray.com/  Enter U096 in the search box to learn more about \"Pinched Nerve in the Neck: Care Instructions. \"  Current as of: March 2, 2020               Content Version: 12.6  © 2006-2020 [employer]. Care instructions adapted under license by [employer] (which disclaims liability or warranty for this information).  If you have questions about a medical condition or this instruction, always ask your healthcare professional. [place] [address] any warranty or liability for your use of this information.

## 2020-11-21 NOTE — ED TRIAGE NOTES
Pt states he has chronic neck pain from a bulging disc and has been more painfull for the past wk and is going down his shoulders and into his head.

## 2020-11-21 NOTE — ED PROVIDER NOTES
EMERGENCY DEPARTMENT HISTORY AND PHYSICAL EXAM     ------------------------------------------------------------------------------------------------------  Please note that this dictation was completed with [employer], the [employer] voice recognition software. Quite often unanticipated grammatical, syntax, homophones, and other interpretive errors are inadvertently transcribed by the computer software. Please disregard these errors. Please excuse any errors that have escaped final proofreading.  -----------------------------------------------------------------------------------------------------------------    Date: 11/20/2020  Patient Name: Vern Honeycutt    History of Presenting Illness     Chief Complaint   Patient presents with    Neck Pain       History Provided By: Patient    HPI: Vern Honeycutt is a 38 y.o. male, with significant pmhx of chronic neck pain, migraines, acs, dm, thyroid dysfunction, who presents via private vehicle to the ED with c/o on chronic posterior neck pain. Patient reports having ongoing issues for the last many years and was previously followed by a spine specialist with [facility] who subsequently retired. Notes he has a bulging disc at C5/C6 and has not had surgery for it. Notes that he was getting ready to have surgery when he had heart attack. Notes he has been intermittently taking Ativan at home to alleviate his spasm type pain. Denies recent injury, lifting of heavy objects or new exercise routines. Reports having chronic intermittent headaches as well and has been having a migraine type headache for the last several days. Attempted to sleep all day to alleviate his symptoms without relief. Reports compliance with his aspirin and Plavix. Is followed by Dr. Lamonte Mariano as his cardiologist.  Worsening pain with movement of his head to the left or right. No recent fever, cough, shortness of breath or known contact with anyone having COVID-19.   Pt also specifically denies any recent fevers, chills, CP, SOB, nausea, vomiting, diarrhea, abd pain, changes in BM, urinary sxs. PCP: Shagufta Suarez MD    Social Hx: denies tobacco, denies EtOH, denies Illicit Drugs     There are no other complaints, changes, or physical findings at this time. Allergies   Allergen Reactions    Morphine Nausea and Vomiting    Penicillin G Swelling    Percocet [Oxycodone-Acetaminophen] Hives and Nausea and Vomiting    Sulfa (Sulfonamide Antibiotics) Swelling    Tramadol Nausea Only     Nausea and headache           Current Outpatient Medications   Medication Sig Dispense Refill    predniSONE (STERAPRED DS) 10 mg dose pack Take as directed on packaging 48 Tab 0    diazePAM (Valium) 5 mg tablet Take 1 Tab by mouth every eight (8) hours as needed (spasm). Max Daily Amount: 15 mg. 15 Tab 0    lidocaine (Lidoderm) 5 % Apply patch to the affected area for 12 hours a day and remove for 12 hours a day. 5 Each 0    cyclobenzaprine (FLEXERIL) 10 mg tablet Take 1 Tab by mouth three (3) times daily as needed for Muscle Spasm(s). 20 Tab 0    lisinopriL (PRINIVIL, ZESTRIL) 10 mg tablet Take 2 Tabs by mouth daily. 60 Tab 0    insulin glargine (LANTUS) 100 unit/mL injection Inject 70 units in the morning and at night--replaces NPH 4 Vial 0    rosuvastatin (CRESTOR) 20 mg tablet Take 1 tablet by mouth nightly 30 Tab 0    insulin regular (NOVOLIN R, HUMULIN R) 100 unit/mL injection 20-38 units with breakfast and 20-38 units with dinner plus correction. 160 units per day max 5 Vial 3    metFORMIN (GLUCOPHAGE) 500 mg tablet TAKE 1 TABLET BY MOUTH TWICE DAILY WITH MEALS 180 Tab 3    clomiPHENE (CLOMID) 50 mg tablet One pill every other day 15 Tab 5    LORazepam (ATIVAN) 1 mg tablet Take 1 Tab by mouth every four (4) hours as needed for Anxiety. Max Daily Amount: 6 mg. 30 Tab 5    busPIRone (BUSPAR) 15 mg tablet Take 1 Tab by mouth two (2) times a day.  60 Tab 5    sertraline (ZOLOFT) 100 mg tablet Take 1 Tab by mouth daily. 30 Tab 5    levothyroxine (SYNTHROID) 112 mcg tablet TAKE 1 TABLET BY MOUTH ONCE DAILY BEFORE BREAKFAST 90 Tab 3    aluminum & magnesium hydroxide-simethicone (Maalox Maximum Strength) 400-400-40 mg/5 mL suspension Take 10 mL by mouth every six (6) hours as needed for Indigestion.  cyclobenzaprine (FLEXERIL) 10 mg tablet Take 1 Tab by mouth three (3) times daily as needed for Muscle Spasm(s). 12 Tab 0    Omeprazole delayed release (PRILOSEC D/R) 20 mg tablet Take 1 Tab by mouth daily. 20 Tab 0    clopidogrel (PLAVIX) 75 mg tab TAKE 1 TABLET BY MOUTH ONCE DAILY      butalbital-acetaminophen-caffeine (FIORICET, ESGIC) -40 mg per tablet TAKE 1 TABLET BY MOUTH AT THE ONSET OF HEADACHE. CAN TAKE TWICE DAILY BUT NOT MORE THAN 10 DAYS/MONTH.ONLY TO BE FILLED IN 1 MONTH INTERVALS 20 Tab 0    ibuprofen (MOTRIN) 600 mg tablet Take 1 Tab by mouth every six (6) hours as needed for Pain. 20 Tab 0    nitroglycerin (NITROSTAT) 0.4 mg SL tablet Take 1 Tab by mouth every five (5) minutes as needed for Chest Pain. Sit down then put one tab under the tongue every 5 minutes as needed 1 Bottle 0    metoprolol succinate (TOPROL-XL) 25 mg XL tablet Take 1 Tab by mouth daily. (Patient taking differently: Take 25 mg by mouth two (2) times a day.) 90 Tab 3    cholecalciferol, VITAMIN D3, (VITAMIN D3) 5,000 unit tab tablet Take 1 Tab by mouth daily. 90 Tab 1    aspirin delayed-release 81 mg tablet Take 1 Tab by mouth daily.  30 Tab 0       Past History     Past Medical History:  Past Medical History:   Diagnosis Date    Anxiety     Chronic headaches 2007    Depression     Diabetes ([address]     GERD (gastroesophageal reflux disease)     Hypercholesterolemia     Hypertension     Thyroid disease     hypothyroid    Unspecified sleep apnea     does not use CPAP       Past Surgical History:  Past Surgical History:   Procedure Laterality Date    HX CHOLECYSTECTOMY  8/26/14    Dr Jin Query    RX FSHVT wisdom teeth removed    HX ORTHOPAEDIC Left 2012    carpel tunnel       Family History:  Family History   Problem Relation Age of Onset    Diabetes Mother     Heart Disease Father     Cancer Father     Diabetes Father     Diabetes Paternal Aunt     Diabetes Maternal Grandmother     Thyroid Cancer Maternal Grandmother     Kidney Disease Maternal Grandmother     Arthritis Maternal Grandmother     Heart Disease Maternal Grandfather     High Cholesterol Maternal Grandfather     Hypertension Maternal Grandfather     Diabetes Paternal Grandmother     Hemophilia Paternal Grandfather        Social History:  Social History     Tobacco Use    Smoking status: Former Smoker     Packs/day: 0.00     Years: 14.00     Pack years: 0.00     Last attempt to quit: 2014     Years since quittin.8    Smokeless tobacco: Never Used   Substance Use Topics    Alcohol use: No    Drug use: No       Allergies: Allergies   Allergen Reactions    Morphine Nausea and Vomiting    Penicillin G Swelling    Percocet [Oxycodone-Acetaminophen] Hives and Nausea and Vomiting    Sulfa (Sulfonamide Antibiotics) Swelling    Tramadol Nausea Only     Nausea and headache           Review of Systems   Review of Systems   Constitutional: Negative for chills and fever. HENT: Negative. Eyes: Negative. Respiratory: Negative for cough, chest tightness and shortness of breath. Cardiovascular: Negative for chest pain and leg swelling. Gastrointestinal: Negative for abdominal pain, diarrhea, nausea and vomiting. Endocrine: Negative. Genitourinary: Negative for difficulty urinating and dysuria. Musculoskeletal: Positive for neck pain. Negative for myalgias. Skin: Negative. Neurological: Negative. Psychiatric/Behavioral: Negative. All other systems reviewed and are negative. Physical Exam   Physical Exam  Vitals signs and nursing note reviewed.    Constitutional:       General: He is not in acute distress. Appearance: He is well-developed. He is not diaphoretic. HENT:      Head: Normocephalic and atraumatic. Nose: Nose normal.      Mouth/Throat:      Pharynx: No oropharyngeal exudate. Eyes:      Conjunctiva/sclera: Conjunctivae normal.      Pupils: Pupils are equal, round, and reactive to light. Neck:      Musculoskeletal: Normal range of motion and neck supple. Vascular: No JVD. Cardiovascular:      Rate and Rhythm: Normal rate and regular rhythm. Heart sounds: Normal heart sounds. No murmur. No friction rub. Pulmonary:      Effort: Pulmonary effort is normal. No respiratory distress. Breath sounds: Normal breath sounds. No stridor. No wheezing or rales. Abdominal:      General: Bowel sounds are normal. There is no distension. Palpations: Abdomen is soft. Tenderness: There is no abdominal tenderness. There is no rebound. Musculoskeletal: Normal range of motion. General: No tenderness. Skin:     General: Skin is warm and dry. Findings: No rash. Neurological:      Mental Status: He is alert and oriented to person, place, and time. Cranial Nerves: No cranial nerve deficit. Psychiatric:         Speech: Speech normal.         Behavior: Behavior normal.         Thought Content: Thought content normal.         Judgment: Judgment normal.           Diagnostic Study Results     Labs -   No results found for this or any previous visit (from the past 12 hour(s)). Radiologic Studies -   No orders to display     CT Results  (Last 48 hours)    None        CXR Results  (Last 48 hours)    None            Medical Decision Making   I am the first provider for this patient. I reviewed the vital signs, available nursing notes, past medical history, past surgical history, family history and social history. Vital Signs-Reviewed the patient's vital signs.   Patient Vitals for the past 12 hrs:   Temp Pulse Resp BP SpO2   11/20/20 2159 97.8 °F (36.6 °C) 85 17 (!) 153/80 99 %       Pulse Oximetry Analysis - 99% on RA normal    Records Reviewed/Interpretted: Nursing Notes from triage and Old Medical Records, noting previous ER visits for cervical radiculopathy    Provider Notes (Medical Decision Making):     DDX:  Cervical radiculopathy, acute on chronic pain, migraine, muscle spasm    Plan:  Analgesic, headache cocktail    Impression:  Chronic neck pain, radiculopathy    ED Course:   Initial assessment performed. The patients presenting problems have been discussed, and they are in agreement with the care plan formulated and outlined with them. I have encouraged them to ask questions as they arise throughout their visit. I reviewed our electronic medical record system for any past medical records that were available that may contribute to the patients current condition, the nursing notes and and vital signs from today's visit  Nursing notes will be reviewed as they become available in realtime while the pt has been in the ED. Nirmal Euceda MD  HYPERTENSION COUNSELING:  Patient made aware of their elevated blood pressure and is instructed to follow up this week with their Primary Care or [address] [id number] for a recheck (should they be discharged.) Patient is counseled regarding consequences of chronic, uncontrolled hypertension including kidney disease, heart disease, stroke or even death. Patient states their understanding    I personally reviewed/interpreted pt's imaging. Agree with official read by radiology as noted above. Nirmal Euceda MD      Progress note:  Pt noted to be feeling better, ready for discharge,  will follow up with primary care and Ortho as instructed. All questions have been answered, pt voiced understanding and agreement with plan. Specific return precautions provided in addition to instructions for pt to return to the ED immediately should sx worsen at any time.   Nirmal Euceda MD             Critical Care Time: none      Diagnosis     Clinical Impression:   1. Chronic midline posterior neck pain    2. Cervical radiculopathy    3. Other chronic pain        PLAN:  1. Discharge Medication List as of 11/21/2020  2:01 AM      START taking these medications    Details   predniSONE (STERAPRED DS) 10 mg dose pack Take as directed on packaging, Normal, Disp-48 Tab,R-0      diazePAM (Valium) 5 mg tablet Take 1 Tab by mouth every eight (8) hours as needed (spasm). Max Daily Amount: 15 mg., Normal, Disp-15 Tab,R-0      lidocaine (Lidoderm) 5 % Apply patch to the affected area for 12 hours a day and remove for 12 hours a day., Normal, Disp-5 Each,R-0         CONTINUE these medications which have NOT CHANGED    Details   !! cyclobenzaprine (FLEXERIL) 10 mg tablet Take 1 Tab by mouth three (3) times daily as needed for Muscle Spasm(s). , Normal, Disp-20 Tab,R-0      lisinopriL (PRINIVIL, ZESTRIL) 10 mg tablet Take 2 Tabs by mouth daily. , Normal, Disp-60 Tab,R-0      insulin glargine (LANTUS) 100 unit/mL injection Inject 70 units in the morning and at night--replaces NPH, Normal, Disp-4 Vial,R-0      rosuvastatin (CRESTOR) 20 mg tablet Take 1 tablet by mouth nightly, Normal, Disp-30 Tab,R-0      insulin regular (NOVOLIN R, HUMULIN R) 100 unit/mL injection 20-38 units with breakfast and 20-38 units with dinner plus correction. 160 units per day max, Normal, Disp-5 Vial,R-3      metFORMIN (GLUCOPHAGE) 500 mg tablet TAKE 1 TABLET BY MOUTH TWICE DAILY WITH MEALS, Normal, Disp-180 Tab,R-3      clomiPHENE (CLOMID) 50 mg tablet One pill every other day, Normal, Disp-15 Tab, R-5      LORazepam (ATIVAN) 1 mg tablet Take 1 Tab by mouth every four (4) hours as needed for Anxiety. Max Daily Amount: 6 mg., Normal, Disp-30 Tab, R-5      busPIRone (BUSPAR) 15 mg tablet Take 1 Tab by mouth two (2) times a day., Normal, Disp-60 Tab, R-5      sertraline (ZOLOFT) 100 mg tablet Take 1 Tab by mouth daily. , Normal, Disp-30 Tab, R-5Please consider 90 day supplies to promote better adherence      levothyroxine (SYNTHROID) 112 mcg tablet TAKE 1 TABLET BY MOUTH ONCE DAILY BEFORE BREAKFAST, Normal, Disp-90 Tab, R-3      aluminum & magnesium hydroxide-simethicone (Maalox Maximum Strength) 400-400-40 mg/5 mL suspension Take 10 mL by mouth every six (6) hours as needed for Indigestion. , Historical Med      !! cyclobenzaprine (FLEXERIL) 10 mg tablet Take 1 Tab by mouth three (3) times daily as needed for Muscle Spasm(s). , Normal, Disp-12 Tab, R-0      Omeprazole delayed release (PRILOSEC D/R) 20 mg tablet Take 1 Tab by mouth daily. , Normal, Disp-20 Tab, R-0      clopidogrel (PLAVIX) 75 mg tab TAKE 1 TABLET BY MOUTH ONCE DAILY, Historical Med      butalbital-acetaminophen-caffeine (FIORICET, ESGIC) -40 mg per tablet TAKE 1 TABLET BY MOUTH AT THE ONSET OF HEADACHE. CAN TAKE TWICE DAILY BUT NOT MORE THAN 10 DAYS/MONTH.ONLY TO BE FILLED IN 1 MONTH INTERVALS, Normal, Disp-20 Tab, R-0      ibuprofen (MOTRIN) 600 mg tablet Take 1 Tab by mouth every six (6) hours as needed for Pain., Normal, Disp-20 Tab, R-0      nitroglycerin (NITROSTAT) 0.4 mg SL tablet Take 1 Tab by mouth every five (5) minutes as needed for Chest Pain. Sit down then put one tab under the tongue every 5 minutes as needed, Normal, Disp-1 Bottle, R-0      metoprolol succinate (TOPROL-XL) 25 mg XL tablet Take 1 Tab by mouth daily. , Print, Disp-90 Tab, R-3      cholecalciferol, VITAMIN D3, (VITAMIN D3) 5,000 unit tab tablet Take 1 Tab by mouth daily. , Print, Disp-90 Tab, R-1      aspirin delayed-release 81 mg tablet Take 1 Tab by mouth daily. , Print, Disp-30 Tab, R-0       !! - Potential duplicate medications found. Please discuss with provider.         2.   Follow-up Information     Follow up With Specialties Details Why Contact Info    Sidney Newman MD Internal Medicine Schedule an appointment as soon as possible for a visit in 2 days  [address]  [phone] Rachel Hernandes MD Orthopedic Surgery Schedule an appointment as soon as possible for a visit in 2 days  [address]  [address]   [phone]          Return to ED if worse     Disposition:  The patient's results have been reviewed with family and/or caregiver. They verbally convey their understanding and agreement of the patient's signs, symptoms, diagnosis, treatment and prognosis and additionally agree to follow up as recommended in the discharge instructions or to return to the Emergency Room should the patient's condition change prior to their follow-up appointment. The family and/or caregiver verbally agrees with the care-plan and all of their questions have been answered. The discharge instructions have also been provided to the them with educational information regarding the patient's diagnosis as well a list of reasons why the patient would want to return to the ER prior to their follow-up appointment should their condition change.   Yeny Burnett MD

## 2020-11-23 ENCOUNTER — PATIENT OUTREACH (OUTPATIENT)
Dept: CASE MANAGEMENT | Age: 40
End: 2020-11-23

## 2020-11-23 NOTE — PROGRESS NOTES
Patient contacted regarding recent discharge and COVID-19 risk. Discussed COVID-19 related testing which was not done at this time. Ambulatory Care Manager contacted the patient by telephone to perform post discharge assessment. Verified name and  with patient as identifiers. Patient has following risk factors of: diabetes and ED visit 20. ACM reviewed discharge instructions, medical action plan and red flags related to discharge diagnosis. Reviewed and educated them on any new and changed medications related to discharge diagnosis. Patient denied questions or concerns regarding discharge instructions or medications. Advance Care Planning:   Does patient have an Advance Directive: currently not on file; education provided     Patient verified healthcare decision makers as correctly listed in chart:  Lauri Armenta (mother) [address] [phone],  Mana Lee (girlfriend) Secondary Decision Maker [phone] and Eula Cantrell (father) [phone]    Education provided regarding infection prevention, and signs and symptoms of COVID-19 and when to seek medical attention with patient who verbalized understanding. Discussed exposure protocols and quarantine from [address] [place] [address] you at higher risk for severe illness  and given an opportunity for questions and concerns. The patient was supplied the COVID-19 hotline [phone] and [facility] department hotline [phone]. [facility] recommended patient follow up with PCP and provided [facility] contact information for future reference. From CDC: Are you at higher risk for severe illness?  Wash your hands often and avoid touching eyes, nose and mouth.  Avoid close contact (6 feet, which is about two arm lengths) with people who are sick.  Put distance between yourself and other people if COVID-19 is spreading in your community.  Clean and disinfect frequently touched surfaces.    Avoid all cruise travel and non-essential air travel.  Call your healthcare professional if you have concerns about COVID-19 and your underlying condition or if you are sick. For more information on steps you can take to protect yourself, see CDC's How to Protect Yourself      Patient/family/caregiver given information for GetWell Loop and agrees to enroll yes  Patient's preferred e-mail:  [email or web address]  Patient's preferred phone number: [phone]  Based on Loop alert triggers, patient will be contacted by nurse care manager for worsening symptoms. Pt will be further monitored by COVID Loop Team based on severity of symptoms and risk factors.     Neelam Neri RN  Ambulatory Care Manager

## 2020-11-23 NOTE — ACP (ADVANCE CARE PLANNING)
Advance Care Planning:   Does patient have an Advance Directive: currently not on file; education provided     Patient verified healthcare decision makers as correctly listed in chart:  Po Davies (mother) [address] [facility] [phone],  Julianabuddy Singleton (girlfriend) Secondary Decision Maker [phone] and Ayan Bustos (father) [phone]  Nichelle Campoverde RN  Ambulatory Care Manager

## 2020-12-01 LAB
CHOLEST SERPL-MCNC: 140 MG/DL (ref 100–199)
EST. AVERAGE GLUCOSE BLD GHB EST-MCNC: 275 MG/DL
FSH SERPL-ACNC: 2.2 MIU/ML (ref 1.5–12.4)
HBA1C MFR BLD: 11.2 % (ref 4.8–5.6)
HDLC SERPL-MCNC: 26 MG/DL
INTERPRETATION, 910389: NORMAL
LDLC SERPL CALC-MCNC: 71 MG/DL (ref 0–99)
LH SERPL-ACNC: 5.5 MIU/ML (ref 1.7–8.6)
Lab: NORMAL
T4 FREE SERPL-MCNC: 1.04 NG/DL (ref 0.82–1.77)
TESTOST FREE SERPL-MCNC: 12.3 PG/ML (ref 6.8–21.5)
TESTOST SERPL-MCNC: 278 NG/DL (ref 264–916)
TRIGL SERPL-MCNC: 262 MG/DL (ref 0–149)
TSH SERPL DL<=0.005 MIU/L-ACNC: 2.84 UIU/ML (ref 0.45–4.5)
VLDLC SERPL CALC-MCNC: 43 MG/DL (ref 5–40)

## 2020-12-04 ENCOUNTER — VIRTUAL VISIT (OUTPATIENT)
Dept: ENDOCRINOLOGY | Age: 40
End: 2020-12-04
Payer: COMMERCIAL

## 2020-12-04 DIAGNOSIS — E29.1 HYPOGONADISM IN MALE: ICD-10-CM

## 2020-12-04 DIAGNOSIS — E03.9 ACQUIRED HYPOTHYROIDISM: ICD-10-CM

## 2020-12-04 DIAGNOSIS — E10.42 TYPE 1 DIABETES MELLITUS WITH DIABETIC POLYNEUROPATHY (HCC): Primary | ICD-10-CM

## 2020-12-04 DIAGNOSIS — E78.2 MIXED HYPERLIPIDEMIA: ICD-10-CM

## 2020-12-04 PROCEDURE — 99215 OFFICE O/P EST HI 40 MIN: CPT | Performed by: INTERNAL MEDICINE

## 2020-12-04 RX ORDER — TESTOSTERONE CYPIONATE 200 MG/ML
INJECTION INTRAMUSCULAR
Qty: 2 ML | Refills: 3 | Status: SHIPPED | OUTPATIENT
Start: 2020-12-04 | End: 2021-06-23

## 2020-12-04 NOTE — PROGRESS NOTES
Chief Complaint   Patient presents with    Diabetes     pcp and pharmacy verified. Eye exam: 1 year ago. DOXY. ME            **THIS IS A VIRTUAL VISIT VIA A VIDEO ENCOUNTER. PATIENT AGREED TO HAVE THEIR CARE DELIVERED OVER VIDEO IN PLACE OF THEIR REGULARLY SCHEDULED OFFICE VISIT**      History of Present Illness: Bernardo Victoria is a 38 y.o. male here for follow up of diabetes. 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 David Rebollar of Cardiology. He was also in the ED in January 2020 with issues of N/V. He was tested for influenza and it was negative. For his issues of vertigo, and dizziness he is followed by Dr. Ina Ta of Neurology. At our visit in April 2020 I increased his insulin regimen, he was no longer having issues of hypoglycemia, but he was having frequent hyperglycemia. Pt instructed to continue the NPH 70 units in the AM and 70 units HS and to increase his Novolin R to 30 units with breakfast, 30 units with lunch and 30 units with dinner, plus 2:50 correction scale. He is now following with Dr. Lazara Pulliam of psychiatry for anxiety and depression. Pt notes he was pt on a regimen of prednisone and his BGS were running in the 600s. His A1C last week was 11.2%. Pt notes that he has been in the hospital for the buldging disc in his neck and that is when they gave him the prednisone. Fallon Munacorbin gave me a prescription of prednisone, but I did not start that. I started more doses of the Novolin R to get my sugars back down to a better range. Pt is now taking Lantus 70 units BID and Novoln R 20-35 with breakfast, 20 with lunch \"If I eat lunch\" (he notes that if his BGs are in the 200s before he eats he will take 14 units) and 20-30 with dinner. He has been taking 10-16 units of R HS because his BGs have been high.     He was scared to start 70 BID of the Lantus, so he started on 40 units BID and has been increasing it progressively and he is now up to the 70 Units BID. He has not been having low BGs. Pt is eating 2-3 meals per day  He has breakfast, around 8-11AM, today he had creamed chipped beef, toast and diet soda. He will have lunch 2-3 days per week. He has not had lunch today or yesterday. He notes that he will occasionally snack during the day on crackers and PB or peanuts or chips. He has dinner around 6-9PM, last night he had chicken, tater tots and diet soda. He will occasionally have an evening snack. Last night he had some crackers. Pt has hx of CAD s/p stenting in December 2019. Pt is taking Rosuvastatin 20mg daily. Pt has hx of neuropathy. No hx of nephropathy. Last eye exam was February 2020. No retinopathy. Pt has hx of hypothyroidism, since the age of 11-9 years old. He is currently taking 112mcg daily. He was biochemically euthyroid in October 2019. At our visit in October 2019 we tested his Thyroid labs, which were normal, but he had a very low Testosterone of 199 on 10/22/19 and repeat was 240 in 10/28/19. His FSH, LH and prolactin were unremarkable. Pt opted to  Not start Clomid 50mg every other day. At our last visit we again ordered the clomid. Pt notes that he has been taking the Clomid 50mg every other day. He notes he is still having issues of ED and poor libido so he stopped taking the Clomid because of the cost.  He wanted to discuss trying an alternative agent. Current Outpatient Medications   Medication Sig    insulin glargine (Lantus U-100 Insulin) 100 unit/mL injection INJECT 70 UNITS SUBCUTANEOUSLY IN THE MORNING AND AT NIGHT    diazePAM (Valium) 5 mg tablet Take 1 Tab by mouth every eight (8) hours as needed (spasm). Max Daily Amount: 15 mg.    cyclobenzaprine (FLEXERIL) 10 mg tablet Take 1 Tab by mouth three (3) times daily as needed for Muscle Spasm(s).  lisinopriL (PRINIVIL, ZESTRIL) 10 mg tablet Take 2 Tabs by mouth daily.     rosuvastatin (CRESTOR) 20 mg tablet Take 1 tablet by mouth nightly    insulin regular (NOVOLIN R, HUMULIN R) 100 unit/mL injection 20-38 units with breakfast and 20-38 units with dinner plus correction. 160 units per day max (Patient taking differently: 20-34 units with breakfast,20-34 units with lunch and  20-34 units with dinner plus correction. 160 units per day max)    metFORMIN (GLUCOPHAGE) 500 mg tablet TAKE 1 TABLET BY MOUTH TWICE DAILY WITH MEALS    LORazepam (ATIVAN) 1 mg tablet Take 1 Tab by mouth every four (4) hours as needed for Anxiety. Max Daily Amount: 6 mg.  busPIRone (BUSPAR) 15 mg tablet Take 1 Tab by mouth two (2) times a day.  sertraline (ZOLOFT) 100 mg tablet Take 1 Tab by mouth daily.  levothyroxine (SYNTHROID) 112 mcg tablet TAKE 1 TABLET BY MOUTH ONCE DAILY BEFORE BREAKFAST    aluminum & magnesium hydroxide-simethicone (Maalox Maximum Strength) 400-400-40 mg/5 mL suspension Take 10 mL by mouth every six (6) hours as needed for Indigestion.  cyclobenzaprine (FLEXERIL) 10 mg tablet Take 1 Tab by mouth three (3) times daily as needed for Muscle Spasm(s).  Omeprazole delayed release (PRILOSEC D/R) 20 mg tablet Take 1 Tab by mouth daily.  clopidogrel (PLAVIX) 75 mg tab TAKE 1 TABLET BY MOUTH ONCE DAILY    butalbital-acetaminophen-caffeine (FIORICET, ESGIC) -40 mg per tablet TAKE 1 TABLET BY MOUTH AT THE ONSET OF HEADACHE. CAN TAKE TWICE DAILY BUT NOT MORE THAN 10 DAYS/MONTH.ONLY TO BE FILLED IN 1 MONTH INTERVALS    nitroglycerin (NITROSTAT) 0.4 mg SL tablet Take 1 Tab by mouth every five (5) minutes as needed for Chest Pain. Sit down then put one tab under the tongue every 5 minutes as needed    metoprolol succinate (TOPROL-XL) 25 mg XL tablet Take 1 Tab by mouth daily. (Patient taking differently: Take 25 mg by mouth two (2) times a day.)    cholecalciferol, VITAMIN D3, (VITAMIN D3) 5,000 unit tab tablet Take 1 Tab by mouth daily.     aspirin delayed-release 81 mg tablet Take 1 Tab by mouth daily.  predniSONE (STERAPRED DS) 10 mg dose pack Take as directed on packaging    lidocaine (Lidoderm) 5 % Apply patch to the affected area for 12 hours a day and remove for 12 hours a day.  clomiPHENE (CLOMID) 50 mg tablet One pill every other day    ibuprofen (MOTRIN) 600 mg tablet Take 1 Tab by mouth every six (6) hours as needed for Pain. No current facility-administered medications for this visit. Allergies   Allergen Reactions    Morphine Nausea and Vomiting    Penicillin G Swelling    Percocet [Oxycodone-Acetaminophen] Hives and Nausea and Vomiting    Sulfa (Sulfonamide Antibiotics) Swelling    Tramadol Nausea Only     Nausea and headache       Review of Systems:  - Eyes: no blurry vision or double vision  - Cardiovascular: no chest pain  - Respiratory: no shortness of breath  - Musculoskeletal: no myalgias  - Neurological: no numbness/tingling in extremities    Physical Examination:  There were no vitals taken for this visit.   - GENERAL: NCAT, Appears well nourished   - EYES: EOMI, non-icteric, no proptosis   - Ear/Nose/Throat: NCAT, no visible inflammation or masses   - CARDIOVASCULAR: no cyanosis, no visible JVD   - RESPIRATORY: respiratory effort normal without any distress or labored breathing   - MUSCULOSKELETAL: Normal ROM of neck and upper extremities observed   - SKIN: No rash on face   - NEUROLOGIC:  No facial asymmetry (Cranial nerve 7 motor function), No gaze palsy   - PSYCHIATRIC: Normal affect, Normal insight and judgement           Data Reviewed:   Component      Latest Ref Rng & Units 11/30/2020 11/30/2020 11/30/2020 11/30/2020          11:18 AM 11:18 AM 11:18 AM 11:18 AM   Cholesterol, total      100 - 199 mg/dL  140     Triglyceride      0 - 149 mg/dL  262 (H)     HDL Cholesterol      >39 mg/dL  26 (L)     VLDL Cholesterol Shaquille      5 - 40 mg/dL  43 (H)     LDL Cholesterol      0 - 99 mg/dL  71     Testosterone      264 - 916 ng/dL   278 Free testosterone (Direct)      6.8 - 21.5 pg/mL   12.3    Luteinizing hormone      1.7 - 8.6 mIU/mL 5.5      FSH      1.5 - 12.4 mIU/mL 2.2      T4, Free      0.82 - 1.77 ng/dL    1.04     Component      Latest Ref Rng & Units 11/30/2020 11/30/2020          11:18 AM 11:18 AM   Hemoglobin A1c, (calculated)      4.8 - 5.6 %  11.2 (H)   Estimated average glucose      mg/dL  275   TSH      0.450 - 4.500 uIU/mL 2.840        Assessment/Plan:   1) DM > His BGs are still running in the 200s range. Will have pt increase his Lantus 80 units in the AM and 70 units HS and continue the Novolin R 20-35 with breakfast, 20 with lunch  And 30 units with dinner. Pt to check his BGs 4 times per day and send me BG readings in 2 weeks. His A1C last week was 11.2%. 2) Hypothyroidism > Pt is euthyroid on LT4 112mcg daily. Will have pt continue the LT4 112mcg daily. 3) Hypogonadism > He has been taking the Clomid, but it was not helping with his symptoms so we agreed to start on Testosterone IM 100mg weekly. 4) HLD > Pt to continue the Rosuvastatin 20mg daily. His lipid panel was at goal in November 2020. Pt voices understanding and agreement with the plan. Pain noted and pt was recommended to call his PCP for further evaluation and treatment, as needed    RTC 4 months    We spent 40 minutes of total time together during this virtual visit with a tele-video encounter. All questions were answered and a copy of the instructions were sent to her via [employer]/a letter.        Copy sent to:  Dr. Shabbir Noguera

## 2020-12-07 RX ORDER — SYRINGE AND NEEDLE,INSULIN,1ML 25GX1"
SYRINGE, EMPTY DISPOSABLE MISCELLANEOUS
Qty: 100 EACH | Refills: 3 | Status: SHIPPED | OUTPATIENT
Start: 2020-12-07

## 2020-12-09 ENCOUNTER — HOSPITAL ENCOUNTER (EMERGENCY)
Age: 40
Discharge: HOME | End: 2020-12-09
Attending: EMERGENCY MEDICINE
Payer: COMMERCIAL

## 2020-12-09 VITALS
OXYGEN SATURATION: 100 % | TEMPERATURE: 98.1 F | BODY MASS INDEX: 39.22 KG/M2 | SYSTOLIC BLOOD PRESSURE: 129 MMHG | RESPIRATION RATE: 16 BRPM | DIASTOLIC BLOOD PRESSURE: 77 MMHG | HEART RATE: 88 BPM | WEIGHT: 264.77 LBS | HEIGHT: 69 IN

## 2020-12-09 DIAGNOSIS — M54.2 NECK PAIN: ICD-10-CM

## 2020-12-09 DIAGNOSIS — M50.20 HERNIATED DISC, CERVICAL: Primary | ICD-10-CM

## 2020-12-09 PROCEDURE — 74011250637 HC RX REV CODE- 250/637: Performed by: EMERGENCY MEDICINE

## 2020-12-09 PROCEDURE — 99283 EMERGENCY DEPT VISIT LOW MDM: CPT

## 2020-12-09 RX ORDER — ACETAMINOPHEN 500 MG
1000 TABLET ORAL ONCE
Status: COMPLETED | OUTPATIENT
Start: 2020-12-09 | End: 2020-12-09

## 2020-12-09 RX ORDER — DIAZEPAM 5 MG/1
5 TABLET ORAL
Qty: 20 TAB | Refills: 0 | OUTPATIENT
Start: 2020-12-09 | End: 2020-12-21

## 2020-12-09 RX ORDER — DIAZEPAM 5 MG/1
5 TABLET ORAL
Status: COMPLETED | OUTPATIENT
Start: 2020-12-09 | End: 2020-12-09

## 2020-12-09 RX ADMIN — DIAZEPAM 5 MG: 5 TABLET ORAL at 22:06

## 2020-12-09 RX ADMIN — IBUPROFEN 600 MG: 200 TABLET, FILM COATED ORAL at 22:06

## 2020-12-09 RX ADMIN — ACETAMINOPHEN 1000 MG: 500 TABLET ORAL at 22:06

## 2020-12-10 ENCOUNTER — PATIENT OUTREACH (OUTPATIENT)
Dept: CASE MANAGEMENT | Age: 40
End: 2020-12-10

## 2020-12-10 NOTE — DISCHARGE INSTRUCTIONS
PAIN CONTROL  Tylenol 1000 mg every 6 hours  Ibuprofen 600mg every 6 hours  Valium 5mg every 8 hours as needed for breakthrough pain  Lidocaine Patch 4% (Over the counter - called Salonpas), icy hot, aspercreme  Heat, ice, massage

## 2020-12-10 NOTE — ACP (ADVANCE CARE PLANNING)
Advance Care Planning:   Does patient have an Advance Directive: currently not on file; education provided  and patient is not interested in completing an Advance Medical Directive at this time.

## 2020-12-10 NOTE — ED PROVIDER NOTES
EMERGENCY DEPARTMENT HISTORY AND PHYSICAL EXAM      Date: 12/9/2020  Patient Name: Tanmay Moy    History of Presenting Illness     Chief Complaint   Patient presents with    Neck Pain     3 to 4 days of acute flare of neck pain. pt has a bulging disc C5 C 6. has tingling radiating down both arms. History Provided By: Patient    HPI: Tanmay Moy, 38 y.o. male  With past medical history of herniated disc in C5-C6 presenting with neck pain. The patient states that he has been having increase in his neck symptoms for the past couple of days. He states that he has been taking Tylenol without relief of his symptoms. He has seen a surgeon in the past but is trying to get a new surgeon. Denies any bowel or bladder dysfunction. He says the pain is exacerbated if he moves his head particularly down towards his chest.  No fevers or chills. No new injuries. No other complaints. There are no other complaints, changes, or physical findings at this time. PCP: Peter Constantino MD    No current facility-administered medications on file prior to encounter. Current Outpatient Medications on File Prior to Encounter   Medication Sig Dispense Refill    Insulin Syringe-Needle U-100 (BD Insulin Syringe) 1 mL 25 x 1\" syrg Use for drawing up/injecting testosterone once weekly. 100 Each 3    testosterone cypionate (DEPOTESTOTERONE CYPIONATE) 200 mg/mL injection Take 1/2 a milileter every week (100mg). 2 mL 3    Syringe with Needle, Disp, 1 mL 25 gauge x 5/8\" syrg Use with testosterone once every week 50 Syringe 3    insulin glargine (Lantus U-100 Insulin) 100 unit/mL injection INJECT 70 UNITS SUBCUTANEOUSLY IN THE MORNING AND AT NIGHT 40 mL 3    predniSONE (STERAPRED DS) 10 mg dose pack Take as directed on packaging 48 Tab 0    diazePAM (Valium) 5 mg tablet Take 1 Tab by mouth every eight (8) hours as needed (spasm).  Max Daily Amount: 15 mg. 15 Tab 0    lidocaine (Lidoderm) 5 % Apply patch to the affected area for 12 hours a day and remove for 12 hours a day. 5 Each 0    cyclobenzaprine (FLEXERIL) 10 mg tablet Take 1 Tab by mouth three (3) times daily as needed for Muscle Spasm(s). 20 Tab 0    lisinopriL (PRINIVIL, ZESTRIL) 10 mg tablet Take 2 Tabs by mouth daily. 60 Tab 0    rosuvastatin (CRESTOR) 20 mg tablet Take 1 tablet by mouth nightly 30 Tab 0    insulin regular (NOVOLIN R, HUMULIN R) 100 unit/mL injection 20-38 units with breakfast and 20-38 units with dinner plus correction. 160 units per day max (Patient taking differently: 20-34 units with breakfast,20-34 units with lunch and  20-34 units with dinner plus correction. 160 units per day max) 5 Vial 3    metFORMIN (GLUCOPHAGE) 500 mg tablet TAKE 1 TABLET BY MOUTH TWICE DAILY WITH MEALS 180 Tab 3    clomiPHENE (CLOMID) 50 mg tablet One pill every other day 15 Tab 5    LORazepam (ATIVAN) 1 mg tablet Take 1 Tab by mouth every four (4) hours as needed for Anxiety. Max Daily Amount: 6 mg. 30 Tab 5    busPIRone (BUSPAR) 15 mg tablet Take 1 Tab by mouth two (2) times a day. 60 Tab 5    sertraline (ZOLOFT) 100 mg tablet Take 1 Tab by mouth daily. 30 Tab 5    levothyroxine (SYNTHROID) 112 mcg tablet TAKE 1 TABLET BY MOUTH ONCE DAILY BEFORE BREAKFAST 90 Tab 3    aluminum & magnesium hydroxide-simethicone (Maalox Maximum Strength) 400-400-40 mg/5 mL suspension Take 10 mL by mouth every six (6) hours as needed for Indigestion.  cyclobenzaprine (FLEXERIL) 10 mg tablet Take 1 Tab by mouth three (3) times daily as needed for Muscle Spasm(s). 12 Tab 0    Omeprazole delayed release (PRILOSEC D/R) 20 mg tablet Take 1 Tab by mouth daily. 20 Tab 0    clopidogrel (PLAVIX) 75 mg tab TAKE 1 TABLET BY MOUTH ONCE DAILY      butalbital-acetaminophen-caffeine (FIORICET, ESGIC) -40 mg per tablet TAKE 1 TABLET BY MOUTH AT THE ONSET OF HEADACHE. CAN TAKE TWICE DAILY BUT NOT MORE THAN 10 DAYS/MONTH.ONLY TO BE FILLED IN 1 MONTH INTERVALS 20 Tab 0    ibuprofen (MOTRIN) 600 mg tablet Take 1 Tab by mouth every six (6) hours as needed for Pain. 20 Tab 0    nitroglycerin (NITROSTAT) 0.4 mg SL tablet Take 1 Tab by mouth every five (5) minutes as needed for Chest Pain. Sit down then put one tab under the tongue every 5 minutes as needed 1 Bottle 0    metoprolol succinate (TOPROL-XL) 25 mg XL tablet Take 1 Tab by mouth daily. (Patient taking differently: Take 25 mg by mouth two (2) times a day.) 90 Tab 3    cholecalciferol, VITAMIN D3, (VITAMIN D3) 5,000 unit tab tablet Take 1 Tab by mouth daily. 90 Tab 1    aspirin delayed-release 81 mg tablet Take 1 Tab by mouth daily.  27 Tab 0       Past History     Past Medical History:  Past Medical History:   Diagnosis Date    Anxiety     Chronic headaches 2007    Depression     Diabetes ([address]     GERD (gastroesophageal reflux disease)     Hypercholesterolemia     Hypertension     Thyroid disease     hypothyroid    Unspecified sleep apnea     does not use CPAP       Past Surgical History:  Past Surgical History:   Procedure Laterality Date    HX CHOLECYSTECTOMY  14    Dr Saumya Vora    HX HEENT      wisdom teeth removed    HX ORTHOPAEDIC Left 2012    carpel tunnel       Family History:  Family History   Problem Relation Age of Onset    Diabetes Mother     Heart Disease Father     Cancer Father     Diabetes Father     Diabetes Paternal Aunt     Diabetes Maternal Grandmother     Thyroid Cancer Maternal Grandmother     Kidney Disease Maternal Grandmother     Arthritis Maternal Grandmother     Heart Disease Maternal Grandfather     High Cholesterol Maternal Grandfather     Hypertension Maternal Grandfather     Diabetes Paternal Grandmother     Hemophilia Paternal Grandfather        Social History:  Social History     Tobacco Use    Smoking status: Former Smoker     Packs/day: 0.00     Years: 14.00     Pack years: 0.00     Last attempt to quit: 2014     Years since quittin.9    Smokeless tobacco: Never Used   Substance Use Topics    Alcohol use: No    Drug use: No       Allergies: Allergies   Allergen Reactions    Morphine Nausea and Vomiting    Penicillin G Swelling    Percocet [Oxycodone-Acetaminophen] Hives and Nausea and Vomiting    Sulfa (Sulfonamide Antibiotics) Swelling    Tramadol Nausea Only     Nausea and headache           Review of Systems   Constitutional: No  fever  Skin: No  rash  HEENT: No  nasal congestion  Resp: No cough  CV: No chest pain  GI: No vomiting  : No dysuria  MSK: + joint pain      Physical Exam     Patient Vitals for the past 12 hrs:   Temp Pulse Resp BP SpO2   12/09/20 1904 98.1 °F (36.7 °C) 88 16 129/77 100 %       General: alert, No acute distress  Eyes: EOMI, normal conjunctiva  ENT: moist mucous membranes. Neck: Posterior tenderness to palpation and neck, no crepitus, muscle spasm on the bilateral paraspinal muscles in the neck. Skin: No rashes. no jaundice              Lungs: Equal chest expansion. no respiratory distress. Heart: regular rate     no peripheral edema    Abd:  non distended soft  Back: Full ROM  MSK: Full, active ROM in all 4 extremities. Neuro: alert  Person, Place, Time and Situation; normal speech;   Psych: Cooperative with exam; Appropriate mood and affect             Diagnostic Study Results     Labs -   No results found for this or any previous visit (from the past 12 hour(s)). Radiologic Studies -   No orders to display     CT Results  (Last 48 hours)    None        CXR Results  (Last 48 hours)    None          Medical Decision Making   I am the first provider for this patient. I reviewed the vital signs, available nursing notes, past medical history, past surgical history, family history and social history. Vital Signs-Reviewed the patient's vital signs.   Patient Vitals for the past 12 hrs:   Temp Pulse Resp BP SpO2   12/09/20 1904 98.1 °F (36.7 °C) 88 16 129/77 100 %       Pulse Oximetry Analysis - 100% on room air -  Interpretation: Normal    Provider Notes (Medical Decision Making):     Differential Diagnosis: Muscle spasm, herniated disc, chronic neck pain    Initial Plan: Oral and topical analgesics and discharged to follow-up as an outpatient. No red flag symptoms, patient is walking without any difficulty, normal neurologic exam.    ED Course:   Initial assessment performed. The patients presenting problems have been discussed, and they are in agreement with the care plan formulated and outlined with them. I have encouraged them to ask questions as they arise throughout their visit. Violeta Reyes MD, am the attending of record for this patient encounter. Dispo: Discharged. The patient has been re-evaluated and is ready for discharge. Reviewed available results with patient. Counseled patient on diagnosis and care plan. Patient has expressed understanding, and all questions have been answered. Patient agrees with plan and agrees to follow up as recommended, or to return to the ED if their symptoms worsen. Discharge instructions have been provided and explained to the patient, along with reasons to return to the ED. PLAN:  Current Discharge Medication List      START taking these medications    Details   !! diazePAM (Valium) 5 mg tablet Take 1 Tab by mouth every eight (8) hours as needed for Muscle Spasm(s). Max Daily Amount: 15 mg.  Qty: 20 Tab, Refills: 0    Associated Diagnoses: Herniated disc, cervical       !! - Potential duplicate medications found. Please discuss with provider. CONTINUE these medications which have NOT CHANGED    Details   !! diazePAM (Valium) 5 mg tablet Take 1 Tab by mouth every eight (8) hours as needed (spasm). Max Daily Amount: 15 mg.  Qty: 15 Tab, Refills: 0    Associated Diagnoses: Chronic midline posterior neck pain       !! - Potential duplicate medications found. Please discuss with provider.         2.     Follow-up Information     Follow up With Specialties Details Why Contact Info    Juan Colon MD Internal Medicine  As needed [id number] [facility]  [address] [place] [address]  [phone]          3. Return to ED if worse       Diagnosis     Clinical Impression:   1. Herniated disc, cervical    2. Neck pain        Attestations:    Lou Saleem MD    Please note that this dictation was completed with [employer], the computer voice recognition software. Quite often unanticipated grammatical, syntax, homophones, and other interpretive errors are inadvertently transcribed by the computer software. Please disregard these errors. Please excuse any errors that have escaped final proofreading. Thank you.

## 2020-12-10 NOTE — PROGRESS NOTES
Patient contacted regarding recent discharge and COVID-19 risk. Discussed COVID-19 related testing which was not done at this time. Test results were not done. Patient informed of results, if available? N/A      Care Transition Nurse/ Ambulatory Care Manager/ LPN Care Coordinator contacted the patient by telephone to perform post discharge assessment. Verified name and  with patient as identifiers. Patient has following risk factors of: diabetes. CTN/ACM/LPN reviewed discharge instructions, medical action plan and red flags related to discharge diagnosis. Reviewed and educated them on any new and changed medications related to discharge diagnosis; Rx- valium. Advised obtaining a 90-day supply of all daily and as-needed medications. Advance Care Planning:   Does patient have an Advance Directive: currently not on file; education provided  and patient is not interested in completing an Advance Medical Directive at this time. Education provided regarding infection prevention, and signs and symptoms of COVID-19 and when to seek medical attention with patient who verbalized understanding. Discussed exposure protocols and quarantine from [address] you at higher risk for severe illness  and given an opportunity for questions and concerns. The patient agrees to contact the COVID-19 hotline [phone] or PCP office for questions related to their healthcare. CTN/ACM/LPN provided contact information for future reference. From CDC: Are you at higher risk for severe illness?  Wash your hands often.  Avoid close contact (6 feet, which is about two arm lengths) with people who are sick.  Put distance between yourself and other people if COVID-19 is spreading in your community.  Clean and disinfect frequently touched surfaces.  Avoid all cruise travel and non-essential air travel.    Call your healthcare professional if you have concerns about COVID-19 and your underlying condition or if you are sick. For more information on steps you can take to protect yourself, see CDC's How to Protect Yourself      Patient/family/caregiver given information for [facility] and agrees to enroll no  Patient's preferred e-mail:  N/A  Patient's preferred phone number: N/A  Based on Loop alert triggers, patient will be contacted by nurse care manager for worsening symptoms. Pt was advised to f/u with PCP (Dr. Naila Arias Provider) post-discharge. Patient declines ACM assistance today with scheduling of PCP follow-up. Plan for follow-up call in 7-14 days based on severity of symptoms and risk factors.

## 2020-12-21 ENCOUNTER — APPOINTMENT (OUTPATIENT)
Dept: GENERAL RADIOLOGY | Age: 40
End: 2020-12-21
Attending: EMERGENCY MEDICINE
Payer: COMMERCIAL

## 2020-12-21 ENCOUNTER — HOSPITAL ENCOUNTER (EMERGENCY)
Age: 40
Discharge: HOME | End: 2020-12-21
Attending: EMERGENCY MEDICINE
Payer: COMMERCIAL

## 2020-12-21 VITALS
DIASTOLIC BLOOD PRESSURE: 59 MMHG | WEIGHT: 255.51 LBS | SYSTOLIC BLOOD PRESSURE: 102 MMHG | RESPIRATION RATE: 16 BRPM | TEMPERATURE: 98.6 F | OXYGEN SATURATION: 94 % | BODY MASS INDEX: 37.84 KG/M2 | HEIGHT: 69 IN | HEART RATE: 91 BPM

## 2020-12-21 DIAGNOSIS — J18.9 COMMUNITY ACQUIRED PNEUMONIA, UNSPECIFIED LATERALITY: Primary | ICD-10-CM

## 2020-12-21 DIAGNOSIS — Z20.822 SUSPECTED COVID-19 VIRUS INFECTION: ICD-10-CM

## 2020-12-21 DIAGNOSIS — E86.0 DEHYDRATION: ICD-10-CM

## 2020-12-21 LAB
ANION GAP SERPL CALC-SCNC: 5 MMOL/L (ref 5–15)
BUN SERPL-MCNC: 15 MG/DL (ref 6–20)
BUN/CREAT SERPL: 12 (ref 12–20)
CALCIUM SERPL-MCNC: 8.4 MG/DL (ref 8.5–10.1)
CHLORIDE SERPL-SCNC: 101 MMOL/L (ref 97–108)
CO2 SERPL-SCNC: 27 MMOL/L (ref 21–32)
CREAT SERPL-MCNC: 1.28 MG/DL (ref 0.7–1.3)
ERYTHROCYTE [DISTWIDTH] IN BLOOD BY AUTOMATED COUNT: 13.6 % (ref 11.5–14.5)
GLUCOSE SERPL-MCNC: 223 MG/DL (ref 65–100)
HCT VFR BLD AUTO: 39.2 % (ref 36.6–50.3)
HGB BLD-MCNC: 13.2 G/DL (ref 12.1–17)
MCH RBC QN AUTO: 28 PG (ref 26–34)
MCHC RBC AUTO-ENTMCNC: 33.7 G/DL (ref 30–36.5)
MCV RBC AUTO: 83.2 FL (ref 80–99)
NRBC # BLD: 0 K/UL (ref 0–0.01)
NRBC BLD-RTO: 0 PER 100 WBC
PLATELET # BLD AUTO: 144 K/UL (ref 150–400)
PMV BLD AUTO: 10.1 FL (ref 8.9–12.9)
POTASSIUM SERPL-SCNC: 4.1 MMOL/L (ref 3.5–5.1)
RBC # BLD AUTO: 4.71 M/UL (ref 4.1–5.7)
SODIUM SERPL-SCNC: 133 MMOL/L (ref 136–145)
WBC # BLD AUTO: 4.2 K/UL (ref 4.1–11.1)

## 2020-12-21 PROCEDURE — 74011250637 HC RX REV CODE- 250/637: Performed by: EMERGENCY MEDICINE

## 2020-12-21 PROCEDURE — 96361 HYDRATE IV INFUSION ADD-ON: CPT

## 2020-12-21 PROCEDURE — 99284 EMERGENCY DEPT VISIT MOD MDM: CPT

## 2020-12-21 PROCEDURE — 85027 COMPLETE CBC AUTOMATED: CPT

## 2020-12-21 PROCEDURE — 36415 COLL VENOUS BLD VENIPUNCTURE: CPT

## 2020-12-21 PROCEDURE — 87635 SARS-COV-2 COVID-19 AMP PRB: CPT

## 2020-12-21 PROCEDURE — 96360 HYDRATION IV INFUSION INIT: CPT

## 2020-12-21 PROCEDURE — 74011250636 HC RX REV CODE- 250/636: Performed by: EMERGENCY MEDICINE

## 2020-12-21 PROCEDURE — 71045 X-RAY EXAM CHEST 1 VIEW: CPT

## 2020-12-21 PROCEDURE — 74011636637 HC RX REV CODE- 636/637: Performed by: EMERGENCY MEDICINE

## 2020-12-21 PROCEDURE — 80048 BASIC METABOLIC PNL TOTAL CA: CPT

## 2020-12-21 RX ORDER — ACETAMINOPHEN 500 MG
1000 TABLET ORAL
Status: COMPLETED | OUTPATIENT
Start: 2020-12-21 | End: 2020-12-21

## 2020-12-21 RX ORDER — AZITHROMYCIN 250 MG/1
TABLET, FILM COATED ORAL
Qty: 6 TAB | Refills: 0 | Status: SHIPPED | OUTPATIENT
Start: 2020-12-21 | End: 2021-02-17 | Stop reason: ALTCHOICE

## 2020-12-21 RX ORDER — GUAIFENESIN 100 MG/5ML
200 SOLUTION ORAL
Status: COMPLETED | OUTPATIENT
Start: 2020-12-21 | End: 2020-12-21

## 2020-12-21 RX ORDER — PREDNISONE 20 MG/1
60 TABLET ORAL
Status: COMPLETED | OUTPATIENT
Start: 2020-12-21 | End: 2020-12-21

## 2020-12-21 RX ORDER — PREDNISONE 20 MG/1
60 TABLET ORAL DAILY
Qty: 15 TAB | Refills: 0 | Status: SHIPPED | OUTPATIENT
Start: 2020-12-21 | End: 2020-12-24

## 2020-12-21 RX ADMIN — PREDNISONE 60 MG: 20 TABLET ORAL at 17:20

## 2020-12-21 RX ADMIN — GUAIFENESIN 200 MG: 200 SOLUTION ORAL at 17:20

## 2020-12-21 RX ADMIN — SODIUM CHLORIDE 1000 ML: 9 INJECTION, SOLUTION INTRAVENOUS at 17:48

## 2020-12-21 RX ADMIN — ACETAMINOPHEN 1000 MG: 500 TABLET ORAL at 17:20

## 2020-12-21 NOTE — DISCHARGE INSTRUCTIONS

## 2020-12-21 NOTE — ED PROVIDER NOTES
EMERGENCY DEPARTMENT HISTORY AND PHYSICAL EXAM      Date: 12/21/2020  Patient Name: Bhavna Lara  Patient Age and Sex: 38 y.o. male     History of Presenting Illness     Chief Complaint   Patient presents with    Cough     sneezing last weekend. cough, diarrhea, headaches behind eyes wednesday. body aches       History Provided By: Patient    HPI: Bhavna Lara is a 79-year-old male with a history of diabetes, hypertension presenting for fever, sneezing, cough. Patient states that 1 week ago started having a cough, with sneezing, congestion. Then on Wednesday he was working with metal and he feels that ever since then has been going downhill. More fatigued, lightheaded and now having fevers. Yesterday his temperature was 100.4. States that a week and a half ago his father passed away so he was in the hospital then. No known COVID-19 exposures. Denies any nausea, vomiting but does state he has been having diarrhea. There are no other complaints, changes, or physical findings at this time. PCP: Charlee Capone MD    No current facility-administered medications on file prior to encounter. Current Outpatient Medications on File Prior to Encounter   Medication Sig Dispense Refill    [DISCONTINUED] diazePAM (Valium) 5 mg tablet Take 1 Tab by mouth every eight (8) hours as needed for Muscle Spasm(s). Max Daily Amount: 15 mg. 20 Tab 0    Insulin Syringe-Needle U-100 (BD Insulin Syringe) 1 mL 25 x 1\" syrg Use for drawing up/injecting testosterone once weekly. 100 Each 3    testosterone cypionate (DEPOTESTOTERONE CYPIONATE) 200 mg/mL injection Take 1/2 a milileter every week (100mg).  2 mL 3    Syringe with Needle, Disp, 1 mL 25 gauge x 5/8\" syrg Use with testosterone once every week 50 Syringe 3    insulin glargine (Lantus U-100 Insulin) 100 unit/mL injection INJECT 70 UNITS SUBCUTANEOUSLY IN THE MORNING AND AT NIGHT 40 mL 3    [DISCONTINUED] predniSONE (STERAPRED DS) 10 mg dose pack Take as directed on packaging 48 Tab 0    [DISCONTINUED] diazePAM (Valium) 5 mg tablet Take 1 Tab by mouth every eight (8) hours as needed (spasm). Max Daily Amount: 15 mg. 15 Tab 0    [DISCONTINUED] lidocaine (Lidoderm) 5 % Apply patch to the affected area for 12 hours a day and remove for 12 hours a day. 5 Each 0    [DISCONTINUED] cyclobenzaprine (FLEXERIL) 10 mg tablet Take 1 Tab by mouth three (3) times daily as needed for Muscle Spasm(s). 20 Tab 0    lisinopriL (PRINIVIL, ZESTRIL) 10 mg tablet Take 2 Tabs by mouth daily. 60 Tab 0    rosuvastatin (CRESTOR) 20 mg tablet Take 1 tablet by mouth nightly 30 Tab 0    insulin regular (NOVOLIN R, HUMULIN R) 100 unit/mL injection 20-38 units with breakfast and 20-38 units with dinner plus correction. 160 units per day max (Patient taking differently: 20-34 units with breakfast,20-34 units with lunch and  20-34 units with dinner plus correction. 160 units per day max) 5 Vial 3    metFORMIN (GLUCOPHAGE) 500 mg tablet TAKE 1 TABLET BY MOUTH TWICE DAILY WITH MEALS 180 Tab 3    clomiPHENE (CLOMID) 50 mg tablet One pill every other day 15 Tab 5    LORazepam (ATIVAN) 1 mg tablet Take 1 Tab by mouth every four (4) hours as needed for Anxiety. Max Daily Amount: 6 mg. 30 Tab 5    busPIRone (BUSPAR) 15 mg tablet Take 1 Tab by mouth two (2) times a day. 60 Tab 5    sertraline (ZOLOFT) 100 mg tablet Take 1 Tab by mouth daily. 30 Tab 5    levothyroxine (SYNTHROID) 112 mcg tablet TAKE 1 TABLET BY MOUTH ONCE DAILY BEFORE BREAKFAST 90 Tab 3    aluminum & magnesium hydroxide-simethicone (Maalox Maximum Strength) 400-400-40 mg/5 mL suspension Take 10 mL by mouth every six (6) hours as needed for Indigestion.  [DISCONTINUED] cyclobenzaprine (FLEXERIL) 10 mg tablet Take 1 Tab by mouth three (3) times daily as needed for Muscle Spasm(s). 12 Tab 0    Omeprazole delayed release (PRILOSEC D/R) 20 mg tablet Take 1 Tab by mouth daily.  20 Tab 0    clopidogrel (PLAVIX) 75 mg tab TAKE 1 TABLET BY MOUTH ONCE DAILY      butalbital-acetaminophen-caffeine (FIORICET, ESGIC) -40 mg per tablet TAKE 1 TABLET BY MOUTH AT THE ONSET OF HEADACHE. CAN TAKE TWICE DAILY BUT NOT MORE THAN 10 DAYS/MONTH.ONLY TO BE FILLED IN 1 MONTH INTERVALS 20 Tab 0    ibuprofen (MOTRIN) 600 mg tablet Take 1 Tab by mouth every six (6) hours as needed for Pain. 20 Tab 0    nitroglycerin (NITROSTAT) 0.4 mg SL tablet Take 1 Tab by mouth every five (5) minutes as needed for Chest Pain. Sit down then put one tab under the tongue every 5 minutes as needed 1 Bottle 0    metoprolol succinate (TOPROL-XL) 25 mg XL tablet Take 1 Tab by mouth daily. (Patient taking differently: Take 25 mg by mouth two (2) times a day.) 90 Tab 3    cholecalciferol, VITAMIN D3, (VITAMIN D3) 5,000 unit tab tablet Take 1 Tab by mouth daily. 90 Tab 1    aspirin delayed-release 81 mg tablet Take 1 Tab by mouth daily.  30 Tab 0       Past History     Past Medical History:  Past Medical History:   Diagnosis Date    Anxiety     Chronic headaches 2007    Depression     Diabetes ([facility] [address]     GERD (gastroesophageal reflux disease)     Hypercholesterolemia     Hypertension     Thyroid disease     hypothyroid    Unspecified sleep apnea     does not use CPAP       Past Surgical History:  Past Surgical History:   Procedure Laterality Date    HX CHOLECYSTECTOMY  8/26/14    Dr Trevor Welch    HX HEENT      wisdom teeth removed    HX ORTHOPAEDIC Left 2012    carpel tunnel       Family History:  Family History   Problem Relation Age of Onset    Diabetes Mother     Heart Disease Father     Cancer Father     Diabetes Father     Diabetes Paternal Aunt     Diabetes Maternal Grandmother     Thyroid Cancer Maternal Grandmother     Kidney Disease Maternal Grandmother     Arthritis Maternal Grandmother     Heart Disease Maternal Grandfather     High Cholesterol Maternal Grandfather     Hypertension Maternal Grandfather     Diabetes Paternal Grandmother     Hemophilia Paternal Grandfather        Social History:  Social History     Tobacco Use    Smoking status: Former Smoker     Packs/day: 0.00     Years: 14.00     Pack years: 0.00     Quit date: 2014     Years since quittin.9    Smokeless tobacco: Never Used   Substance Use Topics    Alcohol use: No    Drug use: No       Allergies: Allergies   Allergen Reactions    Morphine Nausea and Vomiting    Penicillin G Swelling    Percocet [Oxycodone-Acetaminophen] Hives and Nausea and Vomiting    Sulfa (Sulfonamide Antibiotics) Swelling    Tramadol Nausea Only     Nausea and headache           Review of Systems   Review of Systems   Constitutional: Positive for fatigue and fever. Negative for chills. HENT: Positive for congestion. Respiratory: Positive for cough. Negative for shortness of breath. Cardiovascular: Negative for chest pain. Gastrointestinal: Positive for diarrhea and nausea. Negative for abdominal pain, constipation and vomiting. Genitourinary: Negative for dysuria, frequency and hematuria. Neurological: Negative for weakness and numbness. All other systems reviewed and are negative. Physical Exam   Physical Exam  Vitals signs and nursing note reviewed. Constitutional:       Appearance: He is well-developed. HENT:      Head: Normocephalic and atraumatic. Nose: Nose normal.      Mouth/Throat:      Mouth: Mucous membranes are moist.   Eyes:      Extraocular Movements: Extraocular movements intact. Conjunctiva/sclera: Conjunctivae normal.   Neck:      Musculoskeletal: Normal range of motion and neck supple. Cardiovascular:      Rate and Rhythm: Normal rate and regular rhythm. Pulmonary:      Effort: Pulmonary effort is normal. No respiratory distress. Comments: Dry cough, lung exam deferred secondary to Covid pandemic  Abdominal:      General: There is no distension. Palpations: Abdomen is soft. Tenderness: There is no abdominal tenderness. Musculoskeletal: Normal range of motion. Skin:     General: Skin is warm and dry. Neurological:      General: No focal deficit present. Mental Status: He is alert and oriented to person, place, and time. Mental status is at baseline. Psychiatric:         Mood and Affect: Mood normal.          Diagnostic Study Results     Labs -     Recent Results (from the past 12 hour(s))   SARS-COV-2    Collection Time: 12/21/20  5:18 PM   Result Value Ref Range    Specimen source Nasopharyngeal      SARS-CoV-2 PENDING     SARS-CoV-2 PENDING     Specimen source Nasopharyngeal      COVID-19 rapid test PENDING     Specimen type NP Swab      Health status PENDING     COVID-19 PENDING    CBC W/O DIFF    Collection Time: 12/21/20  5:41 PM   Result Value Ref Range    WBC 4.2 4.1 - 11.1 K/uL    RBC 4.71 4.10 - 5.70 M/uL    HGB 13.2 12.1 - 17.0 g/dL    HCT 39.2 36.6 - 50.3 %    MCV 83.2 80.0 - 99.0 FL    MCH 28.0 26.0 - 34.0 PG    MCHC 33.7 30.0 - 36.5 g/dL    RDW 13.6 11.5 - 14.5 %    PLATELET 302 (L) 420 - 400 K/uL    MPV 10.1 8.9 - 12.9 FL    NRBC 0.0 0  WBC    ABSOLUTE NRBC 0.00 0.00 - 6.60 K/uL   METABOLIC PANEL, BASIC    Collection Time: 12/21/20  5:41 PM   Result Value Ref Range    Sodium 133 (L) 136 - 145 mmol/L    Potassium 4.1 3.5 - 5.1 mmol/L    Chloride 101 97 - 108 mmol/L    CO2 27 21 - 32 mmol/L    Anion gap 5 5 - 15 mmol/L    Glucose 223 (H) 65 - 100 mg/dL    BUN 15 6 - 20 MG/DL    Creatinine 1.28 0.70 - 1.30 MG/DL    BUN/Creatinine ratio 12 12 - 20      GFR est AA >60 >60 ml/min/1.73m2    GFR est non-AA >60 >60 ml/min/1.73m2    Calcium 8.4 (L) 8.5 - 10.1 MG/DL       Radiologic Studies -   XR CHEST PORT   Final Result   IMPRESSION: Mild patchy bibasilar airspace disease, suspicious for early   pneumonia.         CT Results  (Last 48 hours)    None        CXR Results  (Last 48 hours)               12/21/20 7897  XR CHEST PORT Final result    Impression:  IMPRESSION: Mild patchy bibasilar airspace disease, suspicious for early   pneumonia. Narrative:  INDICATION: Cough       COMPARISON: February 14, 2020       FINDINGS: AP portable imaging of the chest performed at 5:40 PM demonstrates a   stable cardiomediastinal silhouette. There is mild patchy bibasilar airspace   disease. No significant osseous abnormalities are seen. Medical Decision Making   I am the first provider for this patient. I reviewed the vital signs, available nursing notes, past medical history, past surgical history, family history and social history. Vital Signs-Reviewed the patient's vital signs. Patient Vitals for the past 12 hrs:   Temp Pulse Resp BP SpO2   12/21/20 1756 -- -- -- -- 95 %   12/21/20 1745 -- -- -- (!) 127/59 95 %   12/21/20 1739 98.6 °F (37 °C) -- -- -- --   12/21/20 1735 -- -- -- 106/60 95 %   12/21/20 1730 -- -- -- (!) 98/55 94 %   12/21/20 1657 (!) 100.8 °F (38.2 °C) 91 16 127/74 99 %       Records Reviewed: Nursing Notes and Old Medical Records    Provider Notes (Medical Decision Making):   Patient presenting with fever, cough. Vitals otherwise all within normal limits. Given his history, will get COVID-19 swab, chest x-ray to rule out pneumonia. Differential includes COVID-19 infection, URI, other viral illness, pneumonia. ED Course:   Initial assessment performed. The patients presenting problems have been discussed, and they are in agreement with the care plan formulated and outlined with them. I have encouraged them to ask questions as they arise throughout their visit. ED Course as of Dec 21 1816   Mon Dec 21, 2020   1731 Nurse made me aware that patient stated that he started to feel lightheaded and they checked his blood pressure and it was 98/50. Place IV and give him some fluids. States that he has not been eating well given his cough and cold symptoms. Likely is dehydrated so normal saline and labs ordered.     [JS]      ED Course User Index  [JS] Thalia Conte MD Critical Care Time:   0    Disposition:  Discharge Note:  The patient has been re-evaluated and is ready for discharge. Reviewed available results with patient. Counseled patient on diagnosis and care plan. Patient has expressed understanding, and all questions have been answered. Patient agrees with plan and agrees to follow up as recommended, or to return to the ED if their symptoms worsen. Discharge instructions have been provided and explained to the patient, along with reasons to return to the ED. PLAN:  Current Discharge Medication List      START taking these medications    Details   azithromycin (Zithromax Z-Brad) 250 mg tablet Take 2 tablets on first day, and then 1 tablet daily for next 4 days. Qty: 6 Tab, Refills: 0      predniSONE (DELTASONE) 20 mg tablet Take 60 mg by mouth daily for 3 days. Qty: 15 Tab, Refills: 0      zinc 50 mg tab tablet Take 1 Tab by mouth two (2) times a day for 5 days. Qty: 10 Tab, Refills: 0         STOP taking these medications       diazePAM (Valium) 5 mg tablet Comments:   Reason for Stopping:         predniSONE (STERAPRED DS) 10 mg dose pack Comments:   Reason for Stopping:         diazePAM (Valium) 5 mg tablet Comments:   Reason for Stopping:         lidocaine (Lidoderm) 5 % Comments:   Reason for Stopping:         cyclobenzaprine (FLEXERIL) 10 mg tablet Comments:   Reason for Stopping:         cyclobenzaprine (FLEXERIL) 10 mg tablet Comments:   Reason for Stoppin.   Follow-up Information     Follow up With Specialties Details Why Contact Info    Derrell Martin MD Internal Medicine  As needed [address] [place] [address]  [phone]          3. Return to ED if worse     Diagnosis     Clinical Impression:   1. Community acquired pneumonia, unspecified laterality    2. Suspected COVID-19 virus infection    3. Dehydration        Attestations:    Emmy Barksdale M.D.         Please note that this dictation was completed with Dragon, the computer voice recognition software. Quite often unanticipated grammatical, syntax, homophones, and other interpretive errors are inadvertently transcribed by the computer software. Please disregard these errors. Please excuse any errors that have escaped final proofreading. Thank you.

## 2020-12-22 ENCOUNTER — PATIENT OUTREACH (OUTPATIENT)
Dept: CASE MANAGEMENT | Age: 40
End: 2020-12-22

## 2020-12-22 LAB
HEALTH STATUS, XMCV2T: ABNORMAL
SARS-COV-2, COV2: DETECTED
SOURCE, COVRS: ABNORMAL
SPECIMEN SOURCE, FCOV2M: ABNORMAL
SPECIMEN TYPE, XMCV1T: ABNORMAL

## 2020-12-22 NOTE — ED NOTES
Bedside shift change report given to TY RN (oncoming nurse) by Marie Pyle RN (offgoing nurse). Report included the following information SBAR, Kardex, ED Summary, Intake/Output, MAR and Recent Results.

## 2020-12-22 NOTE — PROGRESS NOTES
Patient contacted regarding COVID-19  risk. Discussed COVID-19 related testing which was available at this time. Test results were positive. Patient informed of results, if available? yes. Outreach made within 2 business days of discharge: Yes    Care Transition Nurse/ Ambulatory Care Manager/ LPN Care Coordinator contacted the patient by telephone to perform post discharge assessment. Verified name and  with patient as identifiers. Provided introduction to self, and explanation of the CTN/ACM/LPN role, and reason for call due to risk factors for infection and/or exposure to COVID-19. Symptoms reviewed with patient who verbalized the following symptoms: cough. Due to no new or worsening symptoms encounter was not routed to provider for escalation. Discussed follow-up appointments. If no appointment was previously scheduled, appointment scheduling offered: Advised to follow up virtually. [facility] follow up appointment(s):   Future Appointments   Date Time Provider Aileen Smith   2021 12:10 PM Malgorzata Marie MD [facility] [address] [facility] AMB     Non-BS follow up appointment(s): n/a      Advance Care Planning:   Does patient have an Advance Directive: health care decision makers updated    Patient has following risk factors of: diabetes. CTN/ACM/LPN reviewed discharge instructions, medical action plan and red flags such as increased shortness of breath, increasing fever and signs of decompensation with patient who verbalized understanding. Discussed exposure protocols and quarantine with CDC Guidelines What to do if you are sick with coronavirus disease .  Patient was given an opportunity for questions and concerns. The patient agrees to contact the [facility] exposure line [phone], local [facility] department [facility]  ([phone]) and PCP office for questions related to their healthcare. CTN/ACM provided contact information for future needs.     Reviewed and educated patient on any new and changed medications related to discharge diagnosis. Patient/family/caregiver given information for [employer] and agrees to enroll yes  Patient's preferred e-mail:  n/a  Patient's preferred phone number: [phone]  Based on Loop alert triggers, patient will be contacted by nurse care manager for worsening symptoms. Pt will be further monitored by COVID Loop Team, pending account activation by patient.  based on severity of symptoms and risk factors.

## 2020-12-22 NOTE — ACP (ADVANCE CARE PLANNING)
Advance Care Planning   Healthcare Decision Maker:       Primary Decision Maker: Ruth Dietrich - Mother - [phone]    Secondary Decision Maker: Spencerdamari Barrera - Girlfriend - [phone]    Click here to complete [address] including selection of the Healthcare Decision Maker Relationship (ie \"Primary\")

## 2020-12-23 NOTE — PROGRESS NOTES
Electronic health records demonstrate patient was contacted via telephone yesterday by Patient Outreach and made aware of his positive SARS-CoV-2 test.

## 2021-01-13 ENCOUNTER — PATIENT OUTREACH (OUTPATIENT)
Dept: CASE MANAGEMENT | Age: 41
End: 2021-01-13

## 2021-01-13 NOTE — PROGRESS NOTES
Per chart review, patient is most recently followed by BETZY Ennis LPN for [address] Transitions s/p subsequent ED visit to [facility] on 12/21/2020. No further outreach and/or follow-up by this ACM is scheduled at this time.

## 2021-02-17 ENCOUNTER — OFFICE VISIT (OUTPATIENT)
Dept: FAMILY MEDICINE CLINIC | Age: 41
End: 2021-02-17
Payer: COMMERCIAL

## 2021-02-17 VITALS
HEIGHT: 69 IN | SYSTOLIC BLOOD PRESSURE: 117 MMHG | WEIGHT: 264 LBS | OXYGEN SATURATION: 98 % | RESPIRATION RATE: 20 BRPM | TEMPERATURE: 96.6 F | HEART RATE: 78 BPM | DIASTOLIC BLOOD PRESSURE: 60 MMHG | BODY MASS INDEX: 39.1 KG/M2

## 2021-02-17 DIAGNOSIS — G47.30 SLEEP APNEA IN ADULT: ICD-10-CM

## 2021-02-17 DIAGNOSIS — E55.9 VITAMIN D DEFICIENCY: ICD-10-CM

## 2021-02-17 DIAGNOSIS — E03.9 ACQUIRED HYPOTHYROIDISM: ICD-10-CM

## 2021-02-17 DIAGNOSIS — R35.0 FREQUENCY OF URINATION: ICD-10-CM

## 2021-02-17 DIAGNOSIS — E78.1 HYPERTRIGLYCERIDEMIA: ICD-10-CM

## 2021-02-17 DIAGNOSIS — I10 HYPERTENSION, WELL CONTROLLED: ICD-10-CM

## 2021-02-17 DIAGNOSIS — E11.8 TYPE 2 DIABETES MELLITUS WITH COMPLICATION, WITH LONG-TERM CURRENT USE OF INSULIN (HCC): Primary | ICD-10-CM

## 2021-02-17 DIAGNOSIS — E11.9 ENCOUNTER FOR DIABETIC FOOT EXAM (HCC): ICD-10-CM

## 2021-02-17 DIAGNOSIS — Z79.4 TYPE 2 DIABETES MELLITUS WITH COMPLICATION, WITH LONG-TERM CURRENT USE OF INSULIN (HCC): Primary | ICD-10-CM

## 2021-02-17 LAB — GLUCOSE POC: 354 MG/DL

## 2021-02-17 PROCEDURE — 82962 GLUCOSE BLOOD TEST: CPT | Performed by: INTERNAL MEDICINE

## 2021-02-17 PROCEDURE — 99214 OFFICE O/P EST MOD 30 MIN: CPT | Performed by: INTERNAL MEDICINE

## 2021-02-17 NOTE — PROGRESS NOTES
Chief Complaint   Patient presents with   [facility] ED Follow-up     regular check up     Sleep Problem    Blurred Vision     Patient have appts with Dr. sAhley Najera Guardian) at [facility] at [address] [place] [address] 5/11/2021

## 2021-02-17 NOTE — PROGRESS NOTES
Chief Complaint   Patient presents with    ED Follow-up     regular check up     Sleep Problem    Blurred Vision     HPI:  Sparkle Holloway is a 42 y.o.  male with h/o poorly controlled diabetes, hypertension, hypercholesterolemia, hypothyroidism, migraine headaches, depression and anxiety presents for follow-up. Patient is c/o problem falling and staying at sleep. He says he wakes up choking. Also, he has additional complaints of blurred Vision . He has appointment for eye exam with Dr. Dre Lee 3/16/21. He will see Dr. Reddy/endocrinology 21 and  Dr. Weaver Neigh his GI: 21.      Review of Systems  As per hpi    Past Medical History:   Diagnosis Date    Anxiety     Chronic headaches 2007    Depression     Diabetes ([address]     GERD (gastroesophageal reflux disease)     Hypercholesterolemia     Hypertension     Thyroid disease     hypothyroid    Unspecified sleep apnea     does not use CPAP     Past Surgical History:   Procedure Laterality Date    HX CHOLECYSTECTOMY  14    Dr Mcgrath Shall    HX HEENT      wisdom teeth removed    HX ORTHOPAEDIC Left 2012    carpel tunnel     Social History     Socioeconomic History    Marital status:      Spouse name: Not on file    Number of children: Not on file    Years of education: Not on file    Highest education level: Not on file   Tobacco Use    Smoking status: Former Smoker     Packs/day: 0.00     Years: 14.00     Pack years: 0.00     Quit date: 2014     Years since quittin.1    Smokeless tobacco: Never Used   Substance and Sexual Activity    Alcohol use: No    Drug use: No    Sexual activity: Not Currently     Family History   Problem Relation Age of Onset    Diabetes Mother     Heart Disease Father     Cancer Father     Diabetes Father     Diabetes Paternal Aunt     Diabetes Maternal Grandmother     Thyroid Cancer Maternal Grandmother     Kidney Disease Maternal Grandmother     Arthritis Maternal Grandmother     Heart Disease Maternal Grandfather     High Cholesterol Maternal Grandfather     Hypertension Maternal Grandfather     Diabetes Paternal Grandmother     Hemophilia Paternal Grandfather      Current Outpatient Medications   Medication Sig Dispense Refill    Insulin Syringe-Needle U-100 (BD Insulin Syringe) 1 mL 25 x 1\" syrg Use for drawing up/injecting testosterone once weekly. 100 Each 3    testosterone cypionate (DEPOTESTOTERONE CYPIONATE) 200 mg/mL injection Take 1/2 a milileter every week (100mg). 2 mL 3    Syringe with Needle, Disp, 1 mL 25 gauge x 5/8\" syrg Use with testosterone once every week 50 Syringe 3    insulin glargine (Lantus U-100 Insulin) 100 unit/mL injection INJECT 70 UNITS SUBCUTANEOUSLY IN THE MORNING AND AT NIGHT 40 mL 3    lisinopriL (PRINIVIL, ZESTRIL) 10 mg tablet Take 2 Tabs by mouth daily. 60 Tab 0    rosuvastatin (CRESTOR) 20 mg tablet Take 1 tablet by mouth nightly 30 Tab 0    insulin regular (NOVOLIN R, HUMULIN R) 100 unit/mL injection 20-38 units with breakfast and 20-38 units with dinner plus correction. 160 units per day max (Patient taking differently: 20-34 units with breakfast,20-34 units with lunch and  20-34 units with dinner plus correction. 160 units per day max) 5 Vial 3    metFORMIN (GLUCOPHAGE) 500 mg tablet TAKE 1 TABLET BY MOUTH TWICE DAILY WITH MEALS 180 Tab 3    clomiPHENE (CLOMID) 50 mg tablet One pill every other day 15 Tab 5    LORazepam (ATIVAN) 1 mg tablet Take 1 Tab by mouth every four (4) hours as needed for Anxiety. Max Daily Amount: 6 mg. 30 Tab 5    busPIRone (BUSPAR) 15 mg tablet Take 1 Tab by mouth two (2) times a day. 60 Tab 5    sertraline (ZOLOFT) 100 mg tablet Take 1 Tab by mouth daily.  30 Tab 5    levothyroxine (SYNTHROID) 112 mcg tablet TAKE 1 TABLET BY MOUTH ONCE DAILY BEFORE BREAKFAST 90 Tab 3    aluminum & magnesium hydroxide-simethicone (Maalox Maximum Strength) 400-400-40 mg/5 mL suspension Take 10 mL by mouth every six (6) hours as needed for Indigestion.  Omeprazole delayed release (PRILOSEC D/R) 20 mg tablet Take 1 Tab by mouth daily. 20 Tab 0    clopidogrel (PLAVIX) 75 mg tab TAKE 1 TABLET BY MOUTH ONCE DAILY      nitroglycerin (NITROSTAT) 0.4 mg SL tablet Take 1 Tab by mouth every five (5) minutes as needed for Chest Pain. Sit down then put one tab under the tongue every 5 minutes as needed 1 Bottle 0    metoprolol succinate (TOPROL-XL) 25 mg XL tablet Take 1 Tab by mouth daily. (Patient taking differently: Take 25 mg by mouth two (2) times a day.) 90 Tab 3    cholecalciferol, VITAMIN D3, (VITAMIN D3) 5,000 unit tab tablet Take 1 Tab by mouth daily. 90 Tab 1    aspirin delayed-release 81 mg tablet Take 1 Tab by mouth daily. 30 Tab 0     Allergies   Allergen Reactions    Morphine Nausea and Vomiting    Penicillin G Swelling    Percocet [Oxycodone-Acetaminophen] Hives and Nausea and Vomiting    Sulfa (Sulfonamide Antibiotics) Swelling    Tramadol Nausea Only     Nausea and headache       Objective:  Visit Vitals  /60   Pulse 78   Temp (!) 96.6 °F (35.9 °C) (Temporal)   Resp 20   Ht 5' 9\" (1.753 m)   Wt 264 lb (119.7 kg)   SpO2 98%   BMI 38.99 kg/m²     Physical Exam:   General appearance - alert, well appearing in no distress  Mental status - alert, oriented to person, place, and time  EYE-PERRL, EOMI  Neck - supple, no significant adenopathy   Chest - clear to auscultation, no wheezes, rales or rhonchi  Heart - normal rate, regular rhythm, no murmurs  Abdomen - soft, nontender, nondistended, no organomegaly  Ext-peripheral pulses normal, no pedal edema  Neuro - no focal findings   Foot Exam: 2+ pulse, no corn/calus, no edema, normal reaction to vibration.     Results for orders placed or performed in visit on 02/17/21   AMB POC GLUCOSE BLOOD, BY GLUCOSE MONITORING DEVICE   Result Value Ref Range    Glucose  mg/dL     Assessment/Plan:  Diagnoses and all orders for this visit:    Type 2 diabetes mellitus with complication, with long-term current use of insulin (HCC)  -     AMB POC GLUCOSE BLOOD, BY GLUCOSE MONITORING DEVICE  -     HEMOGLOBIN A1C WITH EAG; Future  -     CBC W/O DIFF; Future  -     METABOLIC PANEL, COMPREHENSIVE; Future  -     MICROALBUMIN, UR, RAND W/ MICROALB/CREAT RATIO; Future    Sleep apnea in adult  -     SLEEP MEDICINE REFERRAL    Hypertension, well controlled  -     CBC W/O DIFF; Future  -     METABOLIC PANEL, COMPREHENSIVE; Future    Vitamin D deficiency  -     VITAMIN D, 25 HYDROXY; Future    Acquired hypothyroidism  -     TSH 3RD GENERATION; Future    Frequency of urination  -     URINALYSIS W/ RFLX MICROSCOPIC; Future    Hypertriglyceridemia  -     LIPID PANEL; Future      Patient Instructions        Learning About Diabetes Food Guidelines  Your Care Instructions     Meal planning is important to manage diabetes. It helps keep your blood sugar at a target level (which you set with your doctor). You don't have to eat special foods. You can eat what your family eats, including sweets once in a while. But you do have to pay attention to how often you eat and how much you eat of certain foods. You may want to work with a dietitian or a certified diabetes educator (CDE) to help you plan meals and snacks. A dietitian or CDE can also help you lose weight if that is one of your goals. What should you know about eating carbs? Managing the amount of carbohydrate (carbs) you eat is an important part of healthy meals when you have diabetes. Carbohydrate is found in many foods. · Learn which foods have carbs. And learn the amounts of carbs in different foods. ? Bread, cereal, pasta, and rice have about 15 grams of carbs in a serving. A serving is 1 slice of bread (1 ounce), ½ cup of cooked cereal, or 1/3 cup of cooked pasta or rice. ? Fruits have 15 grams of carbs in a serving.  A serving is 1 small fresh fruit, such as an apple or orange; ½ of a banana; ½ cup of cooked or canned fruit; ½ cup of fruit juice; 1 cup of melon or raspberries; or 2 tablespoons of dried fruit. ? Milk and no-sugar-added yogurt have 15 grams of carbs in a serving. A serving is 1 cup of milk or 2/3 cup of no-sugar-added yogurt. ? Starchy vegetables have 15 grams of carbs in a serving. A serving is ½ cup of mashed potatoes or sweet potato; 1 cup winter squash; ½ of a small baked potato; ½ cup of cooked beans; or ½ cup cooked corn or green peas. · Learn how much carbs to eat each day and at each meal. A dietitian or CDE can teach you how to keep track of the amount of carbs you eat. This is called carbohydrate counting. · If you are not sure how to count carbohydrate grams, use the Plate Method to plan meals. It is a good, quick way to make sure that you have a balanced meal. It also helps you spread carbs throughout the day. ? Divide your plate by types of foods. Put non-starchy vegetables on half the plate, meat or other protein food on one-quarter of the plate, and a grain or starchy vegetable in the final quarter of the plate. To this you can add a small piece of fruit and 1 cup of milk or yogurt, depending on how many carbs you are supposed to eat at a meal.  · Try to eat about the same amount of carbs at each meal. Do not \"save up\" your daily allowance of carbs to eat at one meal.  · Proteins have very little or no carbs per serving. Examples of proteins are beef, chicken, turkey, fish, eggs, tofu, cheese, cottage cheese, and peanut butter. A serving size of meat is 3 ounces, which is about the size of a deck of cards. Examples of meat substitute serving sizes (equal to 1 ounce of meat) are 1/4 cup of cottage cheese, 1 egg, 1 tablespoon of peanut butter, and ½ cup of tofu. How can you eat out and still eat healthy? · Learn to estimate the serving sizes of foods that have carbohydrate. If you measure food at home, it will be easier to estimate the amount in a serving of restaurant food.   · If the meal you order has too much carbohydrate (such as potatoes, corn, or baked beans), ask to have a low-carbohydrate food instead. Ask for a salad or green vegetables. · If you use insulin, check your blood sugar before and after eating out to help you plan how much to eat in the future. · If you eat more carbohydrate at a meal than you had planned, take a walk or do other exercise. This will help lower your blood sugar. What else should you know? · Limit saturated fat, such as the fat from meat and dairy products. This is a healthy choice because people who have diabetes are at higher risk of heart disease. So choose lean cuts of meat and nonfat or low-fat dairy products. Use olive or canola oil instead of butter or shortening when cooking. · Don't skip meals. Your blood sugar may drop too low if you skip meals and take insulin or certain medicines for diabetes. · Check with your doctor before you drink alcohol. Alcohol can cause your blood sugar to drop too low. Alcohol can also cause a bad reaction if you take certain diabetes medicines. Follow-up care is a key part of your treatment and safety. Be sure to make and go to all appointments, and call your doctor if you are having problems. It's also a good idea to know your test results and keep a list of the medicines you take. Where can you learn more? Go to http://ladarius-pavel.info/  Enter I147 in the search box to learn more about \"Learning About Diabetes Food Guidelines. \"  Current as of: December 20, 2019               Content Version: 12.6  © 3223-4488 [employer]. Care instructions adapted under license by [employer] (which disclaims liability or warranty for this information). If you have questions about a medical condition or this instruction, always ask your healthcare professional. [place] [address] any warranty or liability for your use of this information.         Follow-up and Dispositions · Return 2-3 weeks, for f/u results.

## 2021-02-17 NOTE — PATIENT INSTRUCTIONS

## 2021-03-03 ENCOUNTER — TELEPHONE (OUTPATIENT)
Dept: ENDOCRINOLOGY | Age: 41
End: 2021-03-03

## 2021-03-03 NOTE — TELEPHONE ENCOUNTER
Per , needs to get refills from PCP for Buspar, Zoloft or Lorazepam.  Patient expressed understanding.

## 2021-03-03 NOTE — TELEPHONE ENCOUNTER
----- Message from [facility] sent at 3/3/2021 11:11 AM EST -----  Regarding: Dr. Yogesh Welch telephone  General Message/Vendor Calls    Caller's first and last name:n/a      Reason for call: medication questions      Callback required yes/no and why:yes      Best contact number(s):[phone]      Details to clarify the request: Patient would like to be contacted to see if Dr. Ion Camargo would be willing to refill his 15mg Buspirone medication as the prescribing doctor has retired.       [facility]

## 2021-03-10 ENCOUNTER — OFFICE VISIT (OUTPATIENT)
Dept: FAMILY MEDICINE CLINIC | Age: 41
End: 2021-03-10
Payer: COMMERCIAL

## 2021-03-10 VITALS
HEIGHT: 69 IN | RESPIRATION RATE: 20 BRPM | BODY MASS INDEX: 37.18 KG/M2 | SYSTOLIC BLOOD PRESSURE: 128 MMHG | HEART RATE: 81 BPM | DIASTOLIC BLOOD PRESSURE: 66 MMHG | TEMPERATURE: 97.5 F | OXYGEN SATURATION: 98 % | WEIGHT: 251 LBS

## 2021-03-10 DIAGNOSIS — E55.9 VITAMIN D DEFICIENCY: ICD-10-CM

## 2021-03-10 DIAGNOSIS — F41.9 ANXIETY: ICD-10-CM

## 2021-03-10 DIAGNOSIS — F06.4 ANXIETY DISORDER DUE TO GENERAL MEDICAL CONDITION WITH PANIC ATTACK: ICD-10-CM

## 2021-03-10 DIAGNOSIS — F41.0 ANXIETY DISORDER DUE TO GENERAL MEDICAL CONDITION WITH PANIC ATTACK: ICD-10-CM

## 2021-03-10 DIAGNOSIS — E11.65 POORLY CONTROLLED DIABETES MELLITUS (HCC): Primary | ICD-10-CM

## 2021-03-10 DIAGNOSIS — F43.29 ADJUSTMENT DISORDER WITH MIXED EMOTIONAL FEATURES: ICD-10-CM

## 2021-03-10 DIAGNOSIS — I10 HYPERTENSION, WELL CONTROLLED: ICD-10-CM

## 2021-03-10 PROCEDURE — 99214 OFFICE O/P EST MOD 30 MIN: CPT | Performed by: INTERNAL MEDICINE

## 2021-03-10 RX ORDER — CHOLECALCIFEROL TAB 125 MCG (5000 UNIT) 125 MCG
5000 TAB ORAL DAILY
Qty: 90 TAB | Refills: 1 | Status: SHIPPED | OUTPATIENT
Start: 2021-03-10

## 2021-03-10 RX ORDER — LORAZEPAM 1 MG/1
1 TABLET ORAL
Qty: 30 TAB | Refills: 5 | Status: CANCELLED | OUTPATIENT
Start: 2021-03-10

## 2021-03-10 RX ORDER — BUSPIRONE HYDROCHLORIDE 15 MG/1
15 TABLET ORAL 2 TIMES DAILY
Qty: 60 TAB | Refills: 3 | Status: SHIPPED | OUTPATIENT
Start: 2021-03-10 | End: 2021-07-08

## 2021-03-10 NOTE — LETTER
3/10/2021 Mr. Jacek Campa [facility] [id number] Dear Jacek Campa: 
 
Please find your most recent results below. Glucose in urine, serum glucose is up, A1C is up(10.8%), Slightly low sodium Vit D is low Resulted Orders MICROALBUMIN, UR, RAND W/ MICROALB/CREAT RATIO Result Value Ref Range Microalbumin,urine random 2.99 MG/DL Creatinine, urine 63.10 mg/dL Microalbumin/Creat ratio (mg/g creat) 47 (H) 0 - 30 mg/g URINALYSIS W/ RFLX MICROSCOPIC Result Value Ref Range Color YELLOW/STRAW Appearance CLEAR CLEAR Specific gravity 1.022 1.003 - 1.030    
 pH (UA) 5.0 5.0 - 8.0 Protein Negative Negative mg/dL Glucose >1,000 (A) Negative mg/dL Ketone Negative Negative mg/dL Bilirubin Negative Negative Blood Negative Negative Urobilinogen 0.2 0.2 - 1.0 EU/dL Nitrites Negative Negative Leukocyte Esterase Negative Negative VITAMIN D, 25 HYDROXY Result Value Ref Range Vitamin D 25-Hydroxy 29.8 (L) 30 - 100 ng/mL METABOLIC PANEL, COMPREHENSIVE Result Value Ref Range Sodium 134 (L) 136 - 145 mmol/L Potassium 4.4 3.5 - 5.1 mmol/L Chloride 100 97 - 108 mmol/L  
 CO2 27 21 - 32 mmol/L Anion gap 7 5 - 15 mmol/L Glucose 292 (H) 65 - 100 mg/dL BUN 13 6 - 20 MG/DL Creatinine 1.03 0.70 - 1.30 MG/DL  
 BUN/Creatinine ratio 13 12 - 20 GFR est AA >60 >60 ml/min/1.73m2 GFR est non-AA >60 >60 ml/min/1.73m2 Calcium 9.0 8.5 - 10.1 MG/DL Bilirubin, total 0.5 0.2 - 1.0 MG/DL  
 ALT (SGPT) 58 12 - 78 U/L  
 AST (SGOT) 25 15 - 37 U/L Alk. phosphatase 84 45 - 117 U/L Protein, total 7.1 6.4 - 8.2 g/dL Albumin 4.0 3.5 - 5.0 g/dL Globulin 3.1 2.0 - 4.0 g/dL A-G Ratio 1.3 1.1 - 2.2    
CBC W/O DIFF Result Value Ref Range WBC 6.5 4.1 - 11.1 K/uL  
 RBC 4.75 4.10 - 5.70 M/uL  
 HGB 13.2 12.1 - 17.0 g/dL HCT 40.4 36.6 - 50.3 %  MCV 85.1 80.0 - 99.0 FL  
 MCH 27.8 26.0 - 34.0 PG  
 MCHC 32.7 30.0 - 36.5 g/dL  
 RDW 14.5 11.5 - 14.5 % PLATELET 604 337 - 849 K/uL MPV 10.4 8.9 - 12.9 FL  
 NRBC 0.0 0  WBC ABSOLUTE NRBC 0.00 0.00 - 0.01 K/uL LIPID PANEL Result Value Ref Range LIPID PROFILE Cholesterol, total 137 <200 MG/DL Triglyceride 303 (H) <150 MG/DL  
 HDL Cholesterol 29 MG/DL  
 LDL, calculated 47.4 0 - 100 MG/DL VLDL, calculated 60.6 MG/DL  
 CHOL/HDL Ratio 4.7 0.0 - 5.0 HEMOGLOBIN A1C WITH EAG Result Value Ref Range Hemoglobin A1c 10.8 (H) 4.0 - 5.6 % Est. average glucose 263 mg/dL TSH 3RD GENERATION Result Value Ref Range TSH 2.10 0.36 - 3.74 uIU/mL RECOMMENDATIONS: 
Work on diet and exercise. Continue with current  medications. Please call me if you have any questions: [phone] Sincerely, Dm Poe MD

## 2021-03-10 NOTE — PROGRESS NOTES
Chief Complaint   Patient presents with    Follow-up     2-3 week     HPI:  Lexi Johnson is a 42 y.o.  male presents for 2 week follow-up on results  Glucose in urine, serum glucose is elevated, A1C is also elevated(10.8%) with slightly low serum sodium  Vit D is low. Vit D supplement has been called in to pharmacy. Patient is been referred to diabetic teaching class. He follows Dr. Brigid Flores for diabetes.     Review of Systems  As per hpi    Past Medical History:   Diagnosis Date    Anxiety     Chronic headaches 2007    Depression     Diabetes ([address]     GERD (gastroesophageal reflux disease)     Hypercholesterolemia     Hypertension     Thyroid disease     hypothyroid    Unspecified sleep apnea     does not use CPAP     Past Surgical History:   Procedure Laterality Date    HX CHOLECYSTECTOMY  14    Dr Lev Ann    HX HEENT      wisdom teeth removed    HX ORTHOPAEDIC Left 2012    carpel tunnel     Social History     Socioeconomic History    Marital status:      Spouse name: Not on file    Number of children: Not on file    Years of education: Not on file    Highest education level: Not on file   Tobacco Use    Smoking status: Former Smoker     Packs/day: 0.00     Years: 14.00     Pack years: 0.00     Quit date: 2014     Years since quittin.1    Smokeless tobacco: Never Used   Substance and Sexual Activity    Alcohol use: No    Drug use: No    Sexual activity: Not Currently     Family History   Problem Relation Age of Onset    Diabetes Mother     Heart Disease Father     Cancer Father     Diabetes Father     Diabetes Paternal Aunt     Diabetes Maternal Grandmother     Thyroid Cancer Maternal Grandmother     Kidney Disease Maternal Grandmother     Arthritis Maternal Grandmother     Heart Disease Maternal Grandfather     High Cholesterol Maternal Grandfather     Hypertension Maternal Grandfather     Diabetes Paternal Grandmother     Hemophilia Paternal Grandfather      Current Outpatient Medications   Medication Sig Dispense Refill    Insulin Syringe-Needle U-100 (BD Insulin Syringe) 1 mL 25 x 1\" syrg Use for drawing up/injecting testosterone once weekly. 100 Each 3    testosterone cypionate (DEPOTESTOTERONE CYPIONATE) 200 mg/mL injection Take 1/2 a milileter every week (100mg). 2 mL 3    Syringe with Needle, Disp, 1 mL 25 gauge x 5/8\" syrg Use with testosterone once every week 50 Syringe 3    insulin glargine (Lantus U-100 Insulin) 100 unit/mL injection INJECT 70 UNITS SUBCUTANEOUSLY IN THE MORNING AND AT NIGHT 40 mL 3    lisinopriL (PRINIVIL, ZESTRIL) 10 mg tablet Take 2 Tabs by mouth daily. 60 Tab 0    rosuvastatin (CRESTOR) 20 mg tablet Take 1 tablet by mouth nightly 30 Tab 0    insulin regular (NOVOLIN R, HUMULIN R) 100 unit/mL injection 20-38 units with breakfast and 20-38 units with dinner plus correction. 160 units per day max (Patient taking differently: 20-34 units with breakfast,20-34 units with lunch and  20-34 units with dinner plus correction. 160 units per day max) 5 Vial 3    metFORMIN (GLUCOPHAGE) 500 mg tablet TAKE 1 TABLET BY MOUTH TWICE DAILY WITH MEALS 180 Tab 3    clomiPHENE (CLOMID) 50 mg tablet One pill every other day 15 Tab 5    LORazepam (ATIVAN) 1 mg tablet Take 1 Tab by mouth every four (4) hours as needed for Anxiety. Max Daily Amount: 6 mg. 30 Tab 5    busPIRone (BUSPAR) 15 mg tablet Take 1 Tab by mouth two (2) times a day. 60 Tab 5    sertraline (ZOLOFT) 100 mg tablet Take 1 Tab by mouth daily. 30 Tab 5    levothyroxine (SYNTHROID) 112 mcg tablet TAKE 1 TABLET BY MOUTH ONCE DAILY BEFORE BREAKFAST 90 Tab 3    aluminum & magnesium hydroxide-simethicone (Maalox Maximum Strength) 400-400-40 mg/5 mL suspension Take 10 mL by mouth every six (6) hours as needed for Indigestion.  Omeprazole delayed release (PRILOSEC D/R) 20 mg tablet Take 1 Tab by mouth daily.  20 Tab 0    clopidogrel (PLAVIX) 75 mg tab TAKE 1 TABLET BY MOUTH ONCE DAILY      nitroglycerin (NITROSTAT) 0.4 mg SL tablet Take 1 Tab by mouth every five (5) minutes as needed for Chest Pain. Sit down then put one tab under the tongue every 5 minutes as needed 1 Bottle 0    metoprolol succinate (TOPROL-XL) 25 mg XL tablet Take 1 Tab by mouth daily. (Patient taking differently: Take 25 mg by mouth two (2) times a day.) 90 Tab 3    cholecalciferol, VITAMIN D3, (VITAMIN D3) 5,000 unit tab tablet Take 1 Tab by mouth daily. 90 Tab 1    aspirin delayed-release 81 mg tablet Take 1 Tab by mouth daily.  30 Tab 0     Allergies   Allergen Reactions    Morphine Nausea and Vomiting    Penicillin G Swelling    Percocet [Oxycodone-Acetaminophen] Hives and Nausea and Vomiting    Sulfa (Sulfonamide Antibiotics) Swelling    Tramadol Nausea Only     Nausea and headache       Objective:  Visit Vitals  /66   Pulse 81   Temp 97.5 °F (36.4 °C) (Temporal)   Resp 20   Ht 5' 9\" (1.753 m)   Wt 251 lb (113.9 kg)   SpO2 98%   BMI 37.07 kg/m²     Physical Exam:   General appearance - obese, alert, well appearing in no distress  Mental status - alert, oriented to person, place, and time  Chest - clear to auscultation, no wheezes, rales or rhonchi  Heart - normal rate, regular rhythm, no murmurs  Abdomen - soft, nontender, nondistended, no organomegaly  Ext-peripheral pulses normal, no pedal edema  Neuro -no focal findings    Results for orders placed or performed in visit on 02/17/21   MICROALBUMIN, UR, RAND W/ MICROALB/CREAT RATIO   Result Value Ref Range    Microalbumin,urine random 2.99 MG/DL    Creatinine, urine 63.10 mg/dL    Microalbumin/Creat ratio (mg/g creat) 47 (H) 0 - 30 mg/g   URINALYSIS W/ RFLX MICROSCOPIC   Result Value Ref Range    Color YELLOW/STRAW      Appearance CLEAR CLEAR      Specific gravity 1.022 1.003 - 1.030      pH (UA) 5.0 5.0 - 8.0      Protein Negative Negative mg/dL    Glucose >1,000 (A) Negative mg/dL    Ketone Negative Negative mg/dL    Bilirubin Negative Negative      Blood Negative Negative      Urobilinogen 0.2 0.2 - 1.0 EU/dL    Nitrites Negative Negative      Leukocyte Esterase Negative Negative     VITAMIN D, 25 HYDROXY   Result Value Ref Range    Vitamin D 25-Hydroxy 29.8 (L) 30 - 047 ng/mL   METABOLIC PANEL, COMPREHENSIVE   Result Value Ref Range    Sodium 134 (L) 136 - 145 mmol/L    Potassium 4.4 3.5 - 5.1 mmol/L    Chloride 100 97 - 108 mmol/L    CO2 27 21 - 32 mmol/L    Anion gap 7 5 - 15 mmol/L    Glucose 292 (H) 65 - 100 mg/dL    BUN 13 6 - 20 MG/DL    Creatinine 1.03 0.70 - 1.30 MG/DL    BUN/Creatinine ratio 13 12 - 20      GFR est AA >60 >60 ml/min/1.73m2    GFR est non-AA >60 >60 ml/min/1.73m2    Calcium 9.0 8.5 - 10.1 MG/DL    Bilirubin, total 0.5 0.2 - 1.0 MG/DL    ALT (SGPT) 58 12 - 78 U/L    AST (SGOT) 25 15 - 37 U/L    Alk.  phosphatase 84 45 - 117 U/L    Protein, total 7.1 6.4 - 8.2 g/dL    Albumin 4.0 3.5 - 5.0 g/dL    Globulin 3.1 2.0 - 4.0 g/dL    A-G Ratio 1.3 1.1 - 2.2     CBC W/O DIFF   Result Value Ref Range    WBC 6.5 4.1 - 11.1 K/uL    RBC 4.75 4.10 - 5.70 M/uL    HGB 13.2 12.1 - 17.0 g/dL    HCT 40.4 36.6 - 50.3 %    MCV 85.1 80.0 - 99.0 FL    MCH 27.8 26.0 - 34.0 PG    MCHC 32.7 30.0 - 36.5 g/dL    RDW 14.5 11.5 - 14.5 %    PLATELET 958 592 - 665 K/uL    MPV 10.4 8.9 - 12.9 FL    NRBC 0.0 0  WBC    ABSOLUTE NRBC 0.00 0.00 - 0.01 K/uL   LIPID PANEL   Result Value Ref Range    LIPID PROFILE          Cholesterol, total 137 <200 MG/DL    Triglyceride 303 (H) <150 MG/DL    HDL Cholesterol 29 MG/DL    LDL, calculated 47.4 0 - 100 MG/DL    VLDL, calculated 60.6 MG/DL    CHOL/HDL Ratio 4.7 0.0 - 5.0     HEMOGLOBIN A1C WITH EAG   Result Value Ref Range    Hemoglobin A1c 10.8 (H) 4.0 - 5.6 %    Est. average glucose 263 mg/dL   TSH 3RD GENERATION   Result Value Ref Range    TSH 2.10 0.36 - 3.74 uIU/mL       Assessment/Plan:  Diagnoses and all orders for this visit:    Poorly controlled diabetes mellitus (HCC)  -     REFERRAL TO DIABETIC EDUCATION; Standing    Anxiety  -     busPIRone (BUSPAR) 15 mg tablet; Take 1 Tab by mouth two (2) times a day., Normal, Disp-60 Tab, R-3  -     REFERRAL TO PSYCHIATRY    Anxiety disorder due to general medical condition with panic attack  -     busPIRone (BUSPAR) 15 mg tablet; Take 1 Tab by mouth two (2) times a day., Normal, Disp-60 Tab, R-3  -     REFERRAL TO PSYCHIATRY    Adjustment disorder with mixed emotional features  -     busPIRone (BUSPAR) 15 mg tablet; Take 1 Tab by mouth two (2) times a day., Normal, Disp-60 Tab, R-3  -     REFERRAL TO PSYCHIATRY    Vitamin D deficiency  -     cholecalciferol (Vitamin D3) (5000 Units/125 mcg) tab tablet; Take 1 Tab by mouth daily. , Normal, Disp-90 Tab, R-1    Hypertension, well controlled    Other orders  -     Cancel: LORazepam (ATIVAN) 1 mg tablet; Take 1 Tab by mouth every four (4) hours as needed for Anxiety. Max Daily Amount: 6 mg., Normal, Disp-30 Tab, R-5      Patient Instructions        Counting Carbohydrates: Care Instructions  Your Care Instructions     You don't have to eat special foods when you have diabetes. You just have to be careful to eat healthy foods. Carbohydrates (carbs) raise blood sugar higher and quicker than any other nutrient. Carbs are found in desserts, breads and cereals, and fruit. They're also in starchy vegetables. These include potatoes, corn, and grains such as rice and pasta. Carbs are also in milk and yogurt. The more carbs you eat at one time, the higher your blood sugar will rise. Spreading carbs all through the day helps keep your blood sugar levels within your target range. Counting carbs is one of the best ways to keep your blood sugar under control. If you use insulin, counting carbs helps you match the right amount of insulin to the number of grams of carbs in a meal. Then you can change your diet and insulin dose as needed.  Testing your blood sugar several times a day can help you learn how carbs affect your blood sugar.  A registered dietitian or certified diabetes educator can help you plan meals and snacks. Follow-up care is a key part of your treatment and safety. Be sure to make and go to all appointments, and call your doctor if you are having problems. It's also a good idea to know your test results and keep a list of the medicines you take. How can you care for yourself at home? Know your daily amount of carbohydrates  Your daily amount depends on several things, such as your weight, how active you are, which diabetes medicines you take, and what your goals are for your blood sugar levels. A registered dietitian or certified diabetes educator can help you plan how many carbs to include in each meal and snack. For most adults, a guideline for the daily amount of carbs is:  · 45 to 60 grams at each meal. That's about the same as 3 to 4 carbohydrate servings. · 15 to 20 grams at each snack. That's about the same as 1 carbohydrate serving. Count carbs  Counting carbs lets you know how much rapid-acting insulin to take before you eat. If you use an insulin pump, you get a constant rate of insulin during the day. So the pump must be programmed at meals. This gives you extra insulin to cover the rise in blood sugar after meals. If you take insulin:  · Learn your own insulin-to-carb ratio. You and your diabetes health professional will figure out the ratio. You can do this by testing your blood sugar after meals. For example, you may need a certain amount of insulin for every 15 grams of carbs. · Add up the carb grams in a meal. Then you can figure out how many units of insulin to take based on your insulin-to-carb ratio. · Exercise lowers blood sugar. You can use less insulin than you would if you were not doing exercise. Keep in mind that timing matters.  If you exercise within 1 hour after a meal, your body may need less insulin for that meal than it would if you exercised 3 hours after the meal. Test your blood sugar to find out how exercise affects your need for insulin. If you do or don't take insulin:  · Look at labels on packaged foods. This can tell you how many carbs are in a serving. You can also use guides from the American Diabetes Association. · Be aware of portions, or serving sizes. If a package has two servings and you eat the whole package, you need to double the number of grams of carbohydrate listed for one serving. · Protein, fat, and fiber do not raise blood sugar as much as carbs do. If you eat a lot of these nutrients in a meal, your blood sugar will rise more slowly than it would otherwise. Eat from all food groups  · Eat at least three meals a day. · Plan meals to include food from all the food groups. The food groups include grains, fruits, dairy, proteins, and vegetables. · Talk to your dietitian or diabetes educator about ways to add limited amounts of sweets into your meal plan. · If you drink alcohol, talk to your doctor. It may not be recommended when you are taking certain diabetes medicines. Where can you learn more? Go to http://www.gray.com/  Enter G703 in the search box to learn more about \"Counting Carbohydrates: Care Instructions. \"  Current as of: December 20, 2019               Content Version: 12.6  © 0204-5735 [employer]. Care instructions adapted under license by [employer] (which disclaims liability or warranty for this information). If you have questions about a medical condition or this instruction, always ask your healthcare professional. [place] [address] any warranty or liability for your use of this information. Follow-up and Dispositions    · Return in about 3 months (around 6/10/2021), or if symptoms worsen or fail to improve, for routine follow up.

## 2021-03-10 NOTE — PATIENT INSTRUCTIONS
Counting Carbohydrates: Care Instructions  Your Care Instructions     You don't have to eat special foods when you have diabetes. You just have to be careful to eat healthy foods. Carbohydrates (carbs) raise blood sugar higher and quicker than any other nutrient. Carbs are found in desserts, breads and cereals, and fruit. They're also in starchy vegetables. These include potatoes, corn, and grains such as rice and pasta. Carbs are also in milk and yogurt. The more carbs you eat at one time, the higher your blood sugar will rise. Spreading carbs all through the day helps keep your blood sugar levels within your target range. Counting carbs is one of the best ways to keep your blood sugar under control. If you use insulin, counting carbs helps you match the right amount of insulin to the number of grams of carbs in a meal. Then you can change your diet and insulin dose as needed. Testing your blood sugar several times a day can help you learn how carbs affect your blood sugar. A registered dietitian or certified diabetes educator can help you plan meals and snacks. Follow-up care is a key part of your treatment and safety. Be sure to make and go to all appointments, and call your doctor if you are having problems. It's also a good idea to know your test results and keep a list of the medicines you take. How can you care for yourself at home? Know your daily amount of carbohydrates  Your daily amount depends on several things, such as your weight, how active you are, which diabetes medicines you take, and what your goals are for your blood sugar levels. A registered dietitian or certified diabetes educator can help you plan how many carbs to include in each meal and snack. For most adults, a guideline for the daily amount of carbs is:  · 45 to 60 grams at each meal. That's about the same as 3 to 4 carbohydrate servings. · 15 to 20 grams at each snack.  That's about the same as 1 carbohydrate serving. Count carbs  Counting carbs lets you know how much rapid-acting insulin to take before you eat. If you use an insulin pump, you get a constant rate of insulin during the day. So the pump must be programmed at meals. This gives you extra insulin to cover the rise in blood sugar after meals. If you take insulin:  · Learn your own insulin-to-carb ratio. You and your diabetes health professional will figure out the ratio. You can do this by testing your blood sugar after meals. For example, you may need a certain amount of insulin for every 15 grams of carbs. · Add up the carb grams in a meal. Then you can figure out how many units of insulin to take based on your insulin-to-carb ratio. · Exercise lowers blood sugar. You can use less insulin than you would if you were not doing exercise. Keep in mind that timing matters. If you exercise within 1 hour after a meal, your body may need less insulin for that meal than it would if you exercised 3 hours after the meal. Test your blood sugar to find out how exercise affects your need for insulin. If you do or don't take insulin:  · Look at labels on packaged foods. This can tell you how many carbs are in a serving. You can also use guides from the American Diabetes Association. · Be aware of portions, or serving sizes. If a package has two servings and you eat the whole package, you need to double the number of grams of carbohydrate listed for one serving. · Protein, fat, and fiber do not raise blood sugar as much as carbs do. If you eat a lot of these nutrients in a meal, your blood sugar will rise more slowly than it would otherwise. Eat from all food groups  · Eat at least three meals a day. · Plan meals to include food from all the food groups. The food groups include grains, fruits, dairy, proteins, and vegetables. · Talk to your dietitian or diabetes educator about ways to add limited amounts of sweets into your meal plan.   · If you drink alcohol, talk to your doctor. It may not be recommended when you are taking certain diabetes medicines. Where can you learn more? Go to http://www.gray.com/  Enter G703 in the search box to learn more about \"Counting Carbohydrates: Care Instructions. \"  Current as of: December 20, 2019               Content Version: 12.6  © 4590-3346 [employer] Care instructions adapted under license by [employer] (which disclaims liability or warranty for this information). If you have questions about a medical condition or this instruction, always ask your healthcare professional. [address] any warranty or liability for your use of this information.

## 2021-03-15 ENCOUNTER — VIRTUAL VISIT (OUTPATIENT)
Dept: DIABETES SERVICES | Age: 41
End: 2021-03-15
Payer: COMMERCIAL

## 2021-03-15 DIAGNOSIS — E11.65 POORLY CONTROLLED DIABETES MELLITUS (HCC): ICD-10-CM

## 2021-03-15 PROCEDURE — G0108 DIAB MANAGE TRN  PER INDIV: HCPCS | Performed by: DIETITIAN, REGISTERED

## 2021-03-15 NOTE — PROGRESS NOTES
[employer]  Diabetes Self-Management Education & Support Program  Pre-program Assessment    Reason for Referral: uncontrolled diabetes  Referral Source: Michelle Graham MD  Services requested: Jose Guadalupe Rios        Mr. Adria Waite was seen today in a virtual visit via doxy. me for education for elevated blood sugars. Patient states he has had diabetes for 33 years, since 25. States his son also has diabetes, on an insulin pump, and diagnosed at age 3. States he has been on the insulin pump in the past and it wasnt for me.   Patient states that since meeting with Dr. Joyce Tsai, he has stopped snacking and has started taking his insulin right after eating instead of 2-3 hours later. Patient also reports that he is rubbing and pressing his injection site because he is getting lumps under the skin. Discussed the importance of allowing the insulin to absorb on its own. States he his also giving all insulin (80 units) in one injection- patient to divide dose in half and inject ½ at a time, 1 inch apart. Reports lipohypertrophy- patient verbalizes understanding to not inject insulin in these areas until they are healed. States that in the past few weeks, he has starting injecting insulin in the back of his arm, and reports better glycemic control. From my perspective, the participant would benefit from [facility] specifically related to Reducing risks, Healthy eating, Monitoring, Physical activity, Taking medications, Healthy coping and Problem solving. Will adapt DSMES program to build on participant's skills score, confidence score and preparedness score as noted in the Diabetes Skills, Confidence, and Preparedness Index. During the program, we will focus on providing DSMES that specifically addresses participant's interest in Reducing risks, Healthy eating, Taking medications and Problem solving, as shown by their reported readiness to change.     The participant would be best served by attending weekly individual sessions. Diabetes Self-Management Education Follow-up Visit: 1 week to begin David training       Clinical Presentation  Jared Cook is a 42 y.o. White male referred for diabetes self-management education. Participant has Type 1 DM for 31-40 years. Family history positive for diabetes. Patient reports receiving DSMES services in the past. Most recent A1c value:   Lab Results   Component Value Date/Time    Hemoglobin A1c 10.8 (H) 02/17/2021 12:24 PM    Hemoglobin A1c (POC) 9.6 01/27/2020 11:55 AM       Diabetes-related medical history:    Macrovascular disease  Myocardial infarction  Other associated conditions     Depression    Diabetes-related medications:  Current dosing:   Key Antihyperglycemic Medications             insulin glargine (Lantus U-100 Insulin) 100 unit/mL injection INJECT 80 UNITS SUBCUTANEOUSLY IN THE MORNING AND AT NIGHT    insulin regular (NOVOLIN R, HUMULIN R) 100 unit/mL injection 20-38 units with breakfast and 20-38 units with dinner plus correction. 160 units per day max    metFORMIN (GLUCOPHAGE) 500 mg tablet TAKE 1 TABLET BY MOUTH TWICE DAILY WITH MEALS          Blood Pressure Management  Key ACE/ARB Medications             lisinopriL (PRINIVIL, ZESTRIL) 10 mg tablet Take 2 Tabs by mouth daily. Lipid Management  Key Antihyperlipidemia Meds             rosuvastatin (CRESTOR) 20 mg tablet Take 1 tablet by mouth nightly          Clot Prevention  Key Anti-Platelet Anticoagulant Meds             clopidogrel (PLAVIX) 75 mg tab TAKE 1 TABLET BY MOUTH ONCE DAILY    aspirin delayed-release 81 mg tablet Take 1 Tab by mouth daily. Learning Assessment  Learning objectives Educator assessment (3/15/2021)   Diabetes Disease Process  The participant can   A) describe diabetes in basic terms;   B) state the type of diabetes they have; &   C) state accepted blood glucose targets.      Healthy Eating  The participant can   A) identify carbohydrate foods; &   B) accurately read food labels. Being Active  The participant can  A) state the benefits of physical activity;  B) report their current PA practices;  C) identify PA they would consider incorporating in their lives; &  D) develop an implementation plan. Monitoring  The participant can  A) operate their blood glucose meter; &  B) describe how they log their blood glucoses to share with their provider. Taking Medications  The participant can  A) name their diabetes medications;  B) state the purpose and dose;  C) note side effects; &  D) describe proper storage, disposal & transport (if appropriate). Healthy Coping  The participant can    A) describe their response to diabetes diagnosis; B) describe their specific coping mechanisms;  C) identify supportive people and/or other resources that positively support their diabetes self-care and health. Reducing Risks  The participant can describe the preventive measures used by providers to promote health and prevent diabetes complications. Problem Solving  The participant can   A) identify signs, symptoms & treatment of hypoglycemia;   B) identify signs, symptoms & treatment of hyperglycemia;  C) describe their sick day plan; &  D) identify BG patterns to discuss with their provider.        Yes  Yes  No        Yes  Yes        Yes  Yes  Yes  Yes        Yes  Yes          Yes  Yes  Yes  No      Yes  Yes  Yes        Yes          Yes  Yes  Yes  Yes     Characteristics to Learning   Barriers to Learning   [] Cognitive loss  [] Mental retardation       [] Psychiatric disorder  [] Visually impaired  [] Hearing loss                 [] Low literacy  [] Low health literacy  [] Language  [] Functional limitation  [] Pain   [] Financial  [] Transportation  [x] None   Favorite Ways to Learn   [] Lecture  [] Slides  [] Reading [] Video-Internet  [] Cassettes/CDs/MP3's  [x] Interactive Small Groups [] Other       Behavioral Assessment  Current self-care practices  Educator assessment (3/15/2021)   Healthy Eating  Current practices     Would benefit from [facility] related to Healthy Eating: Yes      Eats a carbohydrate controlled diet: No      Stage of change: Preparation   Being Active  Current practices  How many days during the past week have you performed physical activity where your heart beats faster and your breathing is harder than normal for 30 minutes or more?  0 days    How many days in a typical week do you perform activity such as this?  0 days     Would benefit from [facility] related to Being Active: Yes      Exercises 150 minutes/week: No      Stage of change: Contemplation     Monitoring  Current practices  Do you monitor your blood sugar? Yes    How often do you monitor? 4x/day    What are the range of readings? 103-354 mg/dL    Do you know your last A1c measurement? Yes    Do you know the meaning of the A1c? Yes     Would benefit from [facility] related to Monitoring: Yes      Uses BG readings to establish trends and understand BG patterns: No      Stage of change: Action   Taking Medication  Current practices  Do you understand what your diabetes medications do? No    How often do you miss doses of your diabetes medications? 1-3 times per week    Can you afford your diabetes medications? Yes   Would benefit from [facility] related to Taking Medication: Yes      Takes medications consistently to receive full benefit: No      Stage of change: Preparation       Healthy Coping   Current state  Diabetes Skills, Confidence and Preparedness Index:   Total score: 5.0  Skills: 5.0  Confidence: 5.0  Preparedness: 5.0   Would benefit from DSMES related to Healthy Coping: Yes      Identifies specific people, organizations,etc, that actively support their diabetes self-care efforts: Yes      Stage of change: Action     Reducing Risks  Current state  Vaccines:  Influenza:   Immunization History   Administered Date(s) Administered    Influenza Vaccine [employer]) PF (>6 Mo Flulaval, Fluarix, and >3 Yrs Kamrontio Asencio 67090) 11/28/2016, 10/17/2019       Pneumococcal: There is no immunization history for the selected administration types on file for this patient. Hepatitis: There is no immunization history for the selected administration types on file for this patient. Examinations:  Diabetic Foot and Eye Exam HM Status   Topic Date Due    Eye Exam  03/16/2021 (Originally 2/16/1990)    Diabetic Foot Care  02/17/2022        Dental exam: DUE    Foot exam: DUE    Heart Protection:  BP Readings from Last 2 Encounters:   03/10/21 128/66   02/17/21 117/60        Lab Results   Component Value Date/Time    LDL,Direct 151 (H) 08/24/2014 02:16 AM    LDL, calculated 47.4 02/17/2021 12:24 PM        Kidney Protection:  Lab Results   Component Value Date/Time    Microalbumin/Creat ratio (mg/g creat) 47 (H) 02/17/2021 12:24 PM    Microalbumin,urine random 2.99 02/17/2021 12:24 PM        Would benefit from [facility] related to Reducing Risks: Yes      Actively participates in decision-making with provider regarding secondary prevention:  Yes      Stage of change: Action   Problem Solving  Current state  Hypoglycemia Management:  What are signs and symptoms of hypoglycemia that you experience? Shaking/trembling    How do you prevent hypoglycemia? Consistent meals/snack times    How do you treat hypoglycemia? Rule of 15    Hyperglycemia Management:  What are signs and symptoms of hyperglycemia that you experience? Frequent urination    How can you prevent hyperglycemia? Take medication as instructed    Sick Day Management:  What do you do differently on sick days? Pt reported being unaware of self-management on sick days    Pattern Management:  Do you notice blood glucose patterns when you look at the readings in your meter or logbook? Yes    How do you use the blood glucose readings from your meter or logbook?  Pt reported being unaware of pattern management skills Would benefit from [facility] related to Problem Solving: Yes      Articulates appropriate strategies to address hypoglycemia, hyperglycemia, sick day care and BG pattern: yes      Stage of change: Action     Note: Content derived from the American Association of Diabetes Educators' Diabetes Education Curriculum: A Guide to Successful Self-Management (2nd edition)      Jayy Kim RD on 3/15/2021 at 2:31 PM    [facility] for Telehealth:  Kendall Demarco is a 42 y.o. male being evaluated through a synchronous (real-time) audio-video technology platform, as a substitution for an in-person encounter, to address the healthcare issues mentioned above. I was in the office. The patient was at home. A caregiver was present when appropriate. The patient and/or his healthcare decision maker, is aware that this patient-initiated, Telehealth encounter on 3/15/2021 is a billable service, with coverage as determined by his insurance carrier. He is aware that he may receive a bill and has provided verbal consent to proceed: Yes. This telehealth encounter occurred during the COVID-19 pandemic and public health emergency. Evaluation of the following organ systems was limited: Vitals/Constitutional/EENT/Resp/CV/GI//MS/Neuro/Skin/Heme-Lymph-Imm. Pursuant to the emergency declaration under the [address] [place] [address] waiver authority and the [employer] Act, this Virtual Visit was conducted with patient's (and/or legal guardian's) consent, to reduce the risk of exposure to COVID-19 and provide necessary medical care.      Time in appointment: 47 minutes

## 2021-03-22 ENCOUNTER — CLINICAL SUPPORT (OUTPATIENT)
Dept: DIABETES SERVICES | Age: 41
End: 2021-03-22

## 2021-03-22 DIAGNOSIS — E11.65 POORLY CONTROLLED DIABETES MELLITUS (HCC): Primary | ICD-10-CM

## 2021-03-22 DIAGNOSIS — E11.65 UNCONTROLLED TYPE 2 DIABETES MELLITUS WITH HYPERGLYCEMIA (HCC): Primary | ICD-10-CM

## 2021-03-22 NOTE — PROGRESS NOTES
[employer]  Diabetes Self-Management Education & Support Program  Encounter note                                                                                       Continuous Glucose Monitor (CGM) Training Note    Mr. Sly Elias presents to office for training on Freestyle Mikal 2 CGM with Registered Nurse Diabetes Educator; Certified CGM Trainer. Diabetes self-care management training was completed related to CGM technology: [employer] Freestyle Mikal 2 CGM. The following areas were addressed:   Purpose of CGM   Appropriate locations for CGM wear   CGM lifespan and when to change site   Site selection and skin preparation   Troubleshooting for site failure, loss of connectivity/signal   Charging of devices, as applicable   Linking of CGM to smart devices, as applicable   Discrepancies between blood glucose meter and CGM data   Calibrations, as applicable   Linking to insulin pump, as applicable   CGM warm-up phase   Proper disposal of CGM and     Mr. Syl Elias demonstrated ability to place Freestyle Mikal 2 CGM and verbalized understanding on training information. Mr. Syl Elias understands he is to contact our office, or the [employer] [address] with any issues related to their CGM. Contact information and number provided. Educational Materials provided. DATE DSMES TOPIC EVALUATION     @[email or web address]@ HOW CAN BLOOD GLUCOSE MONITORING HELP ME?   a. Value of blood glucose monitoring   b. Realistic expectations   c. Blood glucose monitoring targets   d. Target adjustments   e. Setting a1c & blood glucose targets with provider   f. Meter selection    g. Technique for obtaining blood droplet   h. Blood glucose testing sites   i. Determining best times to test   j. Pregnancy recommendations   k.  Data sharing with provider        The participant    Can demonstrate their glucometer procedure YES   Logs their BG readings YES    The participant needs to address Charge [place] daily  Use BG meter for hypoglycemia and discrepancies with CGM data  Contact our office or the [employer] CGM company with any issues related to your CGM device using the contact information and numbers provided. Follow up appointment for continuing Diabetes Education scheduled 4/6/2021.        Nikos Friedman RN on 3/22/2021 at 3:50 PM  Time in appointment: 60 minutes

## 2021-03-26 ENCOUNTER — TELEPHONE (OUTPATIENT)
Dept: DIABETES SERVICES | Age: 41
End: 2021-03-26

## 2021-03-26 RX ORDER — INSULIN GLARGINE 100 [IU]/ML
INJECTION, SOLUTION SUBCUTANEOUS
Qty: 60 ML | Refills: 3 | Status: SHIPPED | OUTPATIENT
Start: 2021-03-26 | End: 2021-09-01

## 2021-03-26 RX ORDER — FLASH GLUCOSE SENSOR
KIT MISCELLANEOUS
Qty: 2 KIT | Refills: 3 | Status: SHIPPED | OUTPATIENT
Start: 2021-03-26 | End: 2022-04-14

## 2021-03-26 RX ORDER — FLASH GLUCOSE SCANNING READER
EACH MISCELLANEOUS
Qty: 1 EACH | Refills: 0 | Status: SHIPPED | OUTPATIENT
Start: 2021-03-26 | End: 2022-04-14

## 2021-03-26 NOTE — TELEPHONE ENCOUNTER
Returned participant call relating to CGM technology. Mr. Ronny Hinkle reported that he \"hit my arm on the door frame and knocked my CGM off\". He stated that he \"really liked it\" and \"wants another one\". Encouraged Mr. Ronny Hinkle to contact provider for prescription for Abbott Freestyle Mikal 2 CGM today. Reviewed BG monitoring. Mr. Ronny Hinkle understands that he will need to utilize his BG meter. He stated that he will contact provider today and will monitor and record BG and communicate results with provider.

## 2021-03-26 NOTE — TELEPHONE ENCOUNTER
Patient states takes 80 units BID of Lantus. Ran out of insulin. Pharmacy won't refill. Advised will send new Rx with the increased units BID.

## 2021-04-05 DIAGNOSIS — F41.9 ANXIETY: ICD-10-CM

## 2021-04-06 RX ORDER — SERTRALINE HYDROCHLORIDE 100 MG/1
100 TABLET, FILM COATED ORAL DAILY
Qty: 30 TAB | Refills: 5 | Status: SHIPPED | OUTPATIENT
Start: 2021-04-06 | End: 2021-10-01

## 2021-04-07 ENCOUNTER — OFFICE VISIT (OUTPATIENT)
Dept: ENDOCRINOLOGY | Age: 41
End: 2021-04-07
Payer: COMMERCIAL

## 2021-04-07 VITALS
SYSTOLIC BLOOD PRESSURE: 125 MMHG | HEART RATE: 80 BPM | RESPIRATION RATE: 14 BRPM | DIASTOLIC BLOOD PRESSURE: 75 MMHG | WEIGHT: 250 LBS | BODY MASS INDEX: 36.92 KG/M2

## 2021-04-07 DIAGNOSIS — E10.42 TYPE 1 DIABETES MELLITUS WITH DIABETIC POLYNEUROPATHY (HCC): Primary | ICD-10-CM

## 2021-04-07 DIAGNOSIS — E03.9 ACQUIRED HYPOTHYROIDISM: ICD-10-CM

## 2021-04-07 DIAGNOSIS — E29.1 HYPOGONADISM IN MALE: ICD-10-CM

## 2021-04-07 DIAGNOSIS — E78.2 MIXED HYPERLIPIDEMIA: ICD-10-CM

## 2021-04-07 PROCEDURE — 99214 OFFICE O/P EST MOD 30 MIN: CPT | Performed by: INTERNAL MEDICINE

## 2021-04-07 NOTE — PROGRESS NOTES
Chief Complaint   Patient presents with    Diabetes     pcp and pharmacy verified. Eye exam: 2021  IN PERSON    Thyroid Problem    Hypogonadism     History of Present Illness: Ebonie Katz is a 42 y.o. male here for follow up of diabetes. 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 Juancarlos Oconnell of Cardiology. He was also in the ED in January 2020 with issues of N/V. He was tested for influenza and it was negative. For his issues of vertigo, and dizziness he is followed by Dr. Noe Bermudez of Neurology. At our visit in April 2020 I increased his insulin regimen, he was no longer having issues of hypoglycemia, but he was having frequent hyperglycemia. Pt instructed to continue the NPH 70 units in the AM and 70 units HS and to increase his Novolin R to 30 units with breakfast, 30 units with lunch and 30 units with dinner, plus 2:50 correction scale. He is now following with Dr. Jayden Carvajal of psychiatry for anxiety and depression. Since our last visit, pt was referred to a Dietician and they have been working with his diet and he has been using [employer] and is testing his BGs 8-10 times per day. He has noted that with checking his BGs more his numbers are some better, but still off. His A1C in February was down to 10.8%. Pt notes he was pt on a regimen of prednisone and his BGS were running in the 600s. His A1C last week was 11.2%. Pt notes that he has been in the hospital for the buldging disc in his neck and that is when they gave him the prednisone. Carlota Sarabia gave me a prescription of prednisone, but I did not start that. I started more doses of the Novolin R to get my sugars back down to a better range. His BGs have been running in the [id number].     Pt is currently taking Lantus 80 units in the AM and 80 units HS and Humalin R 12-30 units with each meal based on his BGs and what he is eating. Pt notes that his PO intake has changed. He notes about a month he was having issues of grazing all night, and his BGs were higher. When he stopped the grazing he noted he has lost 5-6 pounds. He has not been having low BGs. Pt is eating 2-3 meals per day  He has breakfast, around 8-11AM, today he had a chicken sandwich, fries and diet soda. If he has a late breakfast he will skip lunch. He will have lunch 2-3 days per week. He has not had lunch today or yesterday. He notes that he will occasionally snack during the day on crackers and PB or peanuts or chips. He has dinner around 6-8PM, last night he had a taco salad with steak and diet soda. He will occasionally have an evening snack. Last night he had some crackers. He notes he has been snacking on easter candy. Pt has hx of CAD s/p stenting in December 2019. Pt is taking Rosuvastatin 20mg daily. Pt has hx of neuropathy. No hx of nephropathy. Last eye exam was March 2021 \"he said I had blood in my left eye, he said my DM in the eye is looking good, he did not think the blood was due to my diabetes. He referred me to a specialist at [facility]. \"     Pt has hx of hypothyroidism, since the age of 11-9 years old. He is currently taking 112mcg daily. He was biochemically euthyroid in February 2021. At our visit in October 2019 we tested his Thyroid labs, which were normal, but he had a very low Testosterone of 199 on 10/22/19 and repeat was 240 in 10/28/19. His FSH, LH and prolactin were unremarkable. Pt opted to  Not start Clomid 50mg every other day. At our last visit we again ordered the clomid. Pt notes that he has been taking the Clomid 50mg every other day. He notes he is still having issues of ED and poor libido so he stopped taking the Clomid because of the cost.  He wanted to discuss trying an alternative agent. He is now taking 100mg weekly.  He takes his injection on Wednesday afternoon      Current Outpatient Medications Medication Sig    sertraline (ZOLOFT) 100 mg tablet Take 1 Tab by mouth daily.  insulin glargine (Lantus U-100 Insulin) 100 unit/mL injection INJECT 80 UNITS SUBCUTANEOUSLY IN THE MORNING AND 80 UNITS AT NIGHT    flash glucose sensor (FreeStyle Mikal 2 Sensor) kit Change sensor every 14 days    flash glucose scanning reader (FreeStyle Mikal 2 Isabel) misc Change sensor every 14 days    busPIRone (BUSPAR) 15 mg tablet Take 1 Tab by mouth two (2) times a day.  cholecalciferol (Vitamin D3) (5000 Units/125 mcg) tab tablet Take 1 Tab by mouth daily.  Insulin Syringe-Needle U-100 (BD Insulin Syringe) 1 mL 25 x 1\" syrg Use for drawing up/injecting testosterone once weekly.  testosterone cypionate (DEPOTESTOTERONE CYPIONATE) 200 mg/mL injection Take 1/2 a milileter every week (100mg).  Syringe with Needle, Disp, 1 mL 25 gauge x 5/8\" syrg Use with testosterone once every week    lisinopriL (PRINIVIL, ZESTRIL) 10 mg tablet Take 2 Tabs by mouth daily.  rosuvastatin (CRESTOR) 20 mg tablet Take 1 tablet by mouth nightly    insulin regular (NOVOLIN R, HUMULIN R) 100 unit/mL injection 20-38 units with breakfast and 20-38 units with dinner plus correction. 160 units per day max (Patient taking differently: 20-34 units with breakfast,20-34 units with lunch and  20-34 units with dinner plus correction. 160 units per day max)    metFORMIN (GLUCOPHAGE) 500 mg tablet TAKE 1 TABLET BY MOUTH TWICE DAILY WITH MEALS    LORazepam (ATIVAN) 1 mg tablet Take 1 Tab by mouth every four (4) hours as needed for Anxiety. Max Daily Amount: 6 mg.  levothyroxine (SYNTHROID) 112 mcg tablet TAKE 1 TABLET BY MOUTH ONCE DAILY BEFORE BREAKFAST    aluminum & magnesium hydroxide-simethicone (Maalox Maximum Strength) 400-400-40 mg/5 mL suspension Take 10 mL by mouth every six (6) hours as needed for Indigestion.  Omeprazole delayed release (PRILOSEC D/R) 20 mg tablet Take 1 Tab by mouth daily.     clopidogrel (PLAVIX) 75 mg tab TAKE 1 TABLET BY MOUTH ONCE DAILY    nitroglycerin (NITROSTAT) 0.4 mg SL tablet Take 1 Tab by mouth every five (5) minutes as needed for Chest Pain. Sit down then put one tab under the tongue every 5 minutes as needed    metoprolol succinate (TOPROL-XL) 25 mg XL tablet Take 1 Tab by mouth daily. (Patient taking differently: Take 25 mg by mouth two (2) times a day.)    aspirin delayed-release 81 mg tablet Take 1 Tab by mouth daily.  clomiPHENE (CLOMID) 50 mg tablet One pill every other day     No current facility-administered medications for this visit. Allergies   Allergen Reactions    Morphine Nausea and Vomiting    Penicillin G Swelling    Percocet [Oxycodone-Acetaminophen] Hives and Nausea and Vomiting    Sulfa (Sulfonamide Antibiotics) Swelling    Tramadol Nausea Only     Nausea and headache       Review of Systems:  - Eyes: no blurry vision or double vision  - Cardiovascular: no chest pain  - Respiratory: no shortness of breath  - Musculoskeletal: no myalgias  - Neurological: no numbness/tingling in extremities    Physical Examination:  Blood pressure 125/75, pulse 80, resp. rate 14, weight 250 lb (113.4 kg).   - General: pleasant, no distress, good eye contact   - Neck: no carotid bruits  - Cardiovascular: regular, normal rate, nl s1 and s2, no m/r/g, 2+ DP pulses   - Respiratory: clear bilaterally  - Integumentary: no edema, no foot ulcers,   - Psychiatric: normal mood and affect    Diabetic foot exam:     Left Foot:   Visual Exam: callous - present   Pulse DP: 2+ (normal)   Filament test: normal sensation    Vibratory sensation: normal      Right Foot:   Visual Exam: callous - present   Pulse DP: 2+ (normal)   Filament test: normal sensation    Vibratory sensation: normal        Data Reviewed:   Component      Latest Ref Rng & Units 2/17/2021 2/17/2021 2/17/2021 2/17/2021          12:24 PM 12:24 PM 12:24 PM 12:24 PM   WBC      4.1 - 11.1 K/uL    6.5   RBC      4.10 - 5.70 M/uL    4.75 HGB      12.1 - 17.0 g/dL    13.2   HCT      36.6 - 50.3 %    40.4   MCV      80.0 - 99.0 FL    85.1   MCH      26.0 - 34.0 PG    27.8   MCHC      30.0 - 36.5 g/dL    32.7   RDW      11.5 - 14.5 %    14.5   PLATELET      280 - 211 K/uL    178   MPV      8.9 - 12.9 FL    10.4   NRBC      0  WBC    0.0   ABSOLUTE NRBC      0.00 - 0.01 K/uL    0.00   Cholesterol, total      <200 MG/DL   137    Triglyceride      <150 MG/DL   303 (H)    HDL Cholesterol      MG/DL   29    LDL, calculated      0 - 100 MG/DL   47.4    VLDL, calculated      MG/DL   60.6    CHOL/HDL Ratio      0.0 - 5.0     4.7    Hemoglobin A1c, (calculated)      4.0 - 5.6 %  10.8 (H)     Est. average glucose      mg/dL  263     TSH      0.36 - 3.74 uIU/mL 2.10        Component      Latest Ref Rng & Units 2/17/2021 2/17/2021 2/17/2021          12:24 PM 12:24 PM 12:24 PM   Sodium      136 - 145 mmol/L 134 (L)     Potassium      3.5 - 5.1 mmol/L 4.4     Chloride      97 - 108 mmol/L 100     CO2      21 - 32 mmol/L 27     Anion gap      5 - 15 mmol/L 7     Glucose      65 - 100 mg/dL 292 (H)     BUN      6 - 20 MG/DL 13     Creatinine      0.70 - 1.30 MG/DL 1.03     BUN/Creatinine ratio      12 - 20   13     GFR est AA      >60 ml/min/1.73m2 >60     GFR est non-AA      >60 ml/min/1.73m2 >60     Calcium      8.5 - 10.1 MG/DL 9.0     Bilirubin, total      0.2 - 1.0 MG/DL 0.5     ALT      12 - 78 U/L 58     AST      15 - 37 U/L 25     Alk.  phosphatase      45 - 117 U/L 84     Protein, total      6.4 - 8.2 g/dL 7.1     Albumin      3.5 - 5.0 g/dL 4.0     Globulin      2.0 - 4.0 g/dL 3.1     A-G Ratio      1.1 - 2.2   1.3     Color         YELLOW/STRAW   Appearance      CLEAR     CLEAR   Specific gravity      1.003 - 1.030     1.022   pH (UA)      5.0 - 8.0     5.0   Protein      Negative mg/dL   Negative   Glucose      Negative mg/dL   >1,000 (A)   Ketone      Negative mg/dL   Negative   Bilirubin      Negative     Negative   Blood      Negative Negative   Urobilinogen      0.2 - 1.0 EU/dL   0.2   Nitrites      Negative     Negative   Leukocyte Esterase      Negative     Negative   Vitamin D 25-Hydroxy      30 - 100 ng/mL  29.8 (L)    TSH      0.36 - 3.74 uIU/mL        Component      Latest Ref Rng & Units 2/17/2021 2/17/2021          12:24 PM 11:20 AM   Microalbumin,urine random      MG/DL 2.99    Creatinine, urine      mg/dL 63.10    Microalbumin/Creat. Ratio      0 - 30 mg/g 47 (H)    GLUCOSE,FAST - POC      mg/dL  354         Assessment/Plan:   1) DM > His A1C in February was 10.8%. Will have pt continue the Lantus 80 units BID, but increase his Novolin R to 20 units with each meal plus 2:50 correction. Pt to check his BGs 4 times per day and mail his BG logs to me in 2 weeks. 2) Hypothyroidism > Pt is biochemically euthyroid on LT4 112mcg daily. 3) Hypogonadism > He reports that he has been taking the Testosterone 100mg weekly. Will order a testosterone level. His CBC in February was unremarkable. Pt voices understanding and agreement with the plan.   Pain noted and pt was recommended to call his PCP for further evaluation and treatment, as needed    RTC 3 months    Copy sent to:  Dr. Mandie Naranjo

## 2021-04-07 NOTE — PATIENT INSTRUCTIONS
1) Novolin R: Take 20 units with each meal, plus the correction.     2) Correction scale:  150-199 2 additional units  200-249 4 additional units  250-299 6 additional units  300-349 8 additional units  350-399 10 additional units  400-449 12 additional units  450-499 14 additional units  500-549 16 additional units  550+ 18 additional units

## 2021-04-13 ENCOUNTER — CLINICAL SUPPORT (OUTPATIENT)
Dept: DIABETES SERVICES | Age: 41
End: 2021-04-13
Payer: COMMERCIAL

## 2021-04-13 DIAGNOSIS — E11.65 POORLY CONTROLLED DIABETES MELLITUS (HCC): Primary | ICD-10-CM

## 2021-04-13 PROCEDURE — G0108 DIAB MANAGE TRN  PER INDIV: HCPCS | Performed by: DIETITIAN, REGISTERED

## 2021-04-16 LAB
TESTOST FREE SERPL-MCNC: 13 PG/ML (ref 6.8–21.5)
TESTOST SERPL-MCNC: 536 NG/DL (ref 264–916)

## 2021-04-16 NOTE — PROGRESS NOTES
[facility] Program for Diabetes Health  Diabetes Self-Management Education & Support Program  Encounter note    SUMMARY    EVALUATION: Diabetes self-care management training was completed today. Mr. Vinny Daniels has had diabetes for 30 years as well as having a son with Type 1 diabetes. Reviewed CHO counting and choosing healthy foods while eating out that are consistent in CHO as well as heart healthy. He has been chosing foods that are high in saturated fat, mostly fried/fast foods- states this is what he enjoys. Discussed the benefits of healthier eating and to decrease poor food choices by choosing healthier options more frequently. States he is learning how to maneuver around with his CGM as he as had it come off several times. Provided patient with Tegaderm samples to see if this will help with protection. Average BG rading of David rodas is 297 mg/dL (ranging per patient from  mg/dL). Patient admits that he did eat more Easter candy than he should have. He notes that his blood sugars were well within range on Saturday when he was actively working in the yard. Encouarged patient to be morephysically active to help decrease insulin resistance. Mr. Vinny Daniels understands that he will need to utilize his BG meter- states he has been finger stick checking when he has been feeling hypoglycemic. Discussed insulin pump technology with patient. He states this may be something he would be interested in the future to help with BG control. States he has dividing his basal insulin it 2 different injections (40 units twice) and he states that he is having few knots under the skin as well better BG control. RECOMMENDATIONS:    States will monitor and record BG and communicate results with provider. No further appointment has been scheduled at this time with [facility]. To continue to work with Dr. Bushra Mariee and Dr. Puja Monique in regards to his diabetes. SMART GOAL(S)  1.  Patient did not set a goal  ACHIEVEMENT OF GOAL(S)  [x] 0-24%     [] 25-49%     [] 50-74%     [] %           DATE DSMES TOPIC EVALUATION     4/21/2021 WHAT CAN I EAT?   a. General principles   b. Nutritional terms & tools    Healthy Plate method    Carbohydrate Counting    Reading food labels    Free apps        The participant    Uses Healthy Plate principles in constructing meals No   Reads food labels in choosing acceptable foods Yes    The participant needs to address- decreasing eating out frequency as well as decrease saturated fats.      Time in session: 65 minutes

## 2021-05-03 DIAGNOSIS — E03.9 ACQUIRED HYPOTHYROIDISM: ICD-10-CM

## 2021-05-03 RX ORDER — LEVOTHYROXINE SODIUM 112 UG/1
TABLET ORAL
Qty: 90 TAB | Refills: 0 | Status: SHIPPED | OUTPATIENT
Start: 2021-05-03 | End: 2021-07-27

## 2021-05-20 ENCOUNTER — VIRTUAL VISIT (OUTPATIENT)
Dept: SLEEP MEDICINE | Age: 41
End: 2021-05-20
Payer: COMMERCIAL

## 2021-05-20 VITALS — HEIGHT: 69 IN | BODY MASS INDEX: 38.21 KG/M2 | WEIGHT: 258 LBS

## 2021-05-20 DIAGNOSIS — F32.A ANXIETY AND DEPRESSION: ICD-10-CM

## 2021-05-20 DIAGNOSIS — I10 ESSENTIAL HYPERTENSION: ICD-10-CM

## 2021-05-20 DIAGNOSIS — G47.33 OSA (OBSTRUCTIVE SLEEP APNEA): Primary | ICD-10-CM

## 2021-05-20 DIAGNOSIS — F41.9 ANXIETY AND DEPRESSION: ICD-10-CM

## 2021-05-20 PROCEDURE — 99203 OFFICE O/P NEW LOW 30 MIN: CPT | Performed by: INTERNAL MEDICINE
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
## 2021-05-20 NOTE — PATIENT INSTRUCTIONS
217 [facility]., [place]. 1668 [facility], [address] Tel.  [phone] Fax. 100 [facility] [address] [place], [address] [facility] Tel.  [phone] Fax. [phone] [address] Tel.  [phone] Fax. [phone] Sleep Apnea: After Your Visit Your Care Instructions Sleep apnea occurs when you frequently stop breathing for 10 seconds or longer during sleep. It can be mild to severe, based on the number of times per hour that you stop breathing or have slowed breathing. Blocked or narrowed airways in your nose, mouth, or throat can cause sleep apnea. Your airway can become blocked when your throat muscles and tongue relax during sleep. Sleep apnea is common, occurring in 1 out of 20 individuals. Individuals having any of the following characteristics should be evaluated and treated right away due to high risk and detrimental consequences from untreated sleep apnea: 
1. Obesity 2. Congestive Heart failure 3. Atrial Fibrillation 4. Uncontrolled Hypertension 5. Type II Diabetes 6. Night-time Arrhythmias 7. Stroke 8. Pulmonary Hypertension 9. High-risk Driving Populations (pilots, truck drivers, etc.) 10. Patients Considering Weight-loss Surgery How do you know you have sleep apnea? You probably have sleep apnea if you answer 'yes' to 3 or more of the following questions: S - Have you been told that you Snore? T - Are you often Tired during the day? O - Has anyone Observed you stop breathing while sleeping? P- Do you have (or are being treated for) high blood Pressure? B - Are you obese (Body Mass Index > 35)? A - Is your Age 48years old or older? N - Is your Neck size greater than 16 inches? G - Are you male Gender? A sleep physician can prescribe a breathing device that prevents tissues in the throat from blocking your airway.  Or your doctor may recommend using a dental device (oral breathing device) to help keep your airway open. In some cases, surgery may be needed to remove enlarged tissues in the throat. Follow-up care is a key part of your treatment and safety. Be sure to make and go to all appointments, and call your doctor if you are having problems. It's also a good idea to know your test results and keep a list of the medicines you take. How can you care for yourself at home? · Lose weight, if needed. It may reduce the number of times you stop breathing or have slowed breathing. · Go to bed at the same time every night. · Sleep on your side. It may stop mild apnea. If you tend to roll onto your back, sew a pocket in the back of your pajama top. Put a tennis ball into the pocket, and stitch the pocket shut. This will help keep you from sleeping on your back. · Avoid alcohol and medicines such as sleeping pills and sedatives before bed. · Do not smoke. Smoking can make sleep apnea worse. If you need help quitting, talk to your doctor about stop-smoking programs and medicines. These can increase your chances of quitting for good. · Prop up the head of your bed 4 to 6 inches by putting bricks under the legs of the bed. · Treat breathing problems, such as a stuffy nose, caused by a cold or allergies. · Use a continuous positive airway pressure (CPAP) breathing machine if lifestyle changes do not help your apnea and your doctor recommends it. The machine keeps your airway from closing when you sleep. · If CPAP does not help you, ask your doctor whether you should try other breathing machines. A bilevel positive airway pressure machine has two types of air pressureâone for breathing in and one for breathing out. Another device raises or lowers air pressure as needed while you breathe. · If your nose feels dry or bleeds when using one of these machines, talk with your doctor about increasing moisture in the air. A humidifier may help.  
· If your nose is runny or stuffy from using a breathing machine, talk with your doctor about using decongestants or a corticosteroid nasal spray. When should you call for help? Watch closely for changes in your health, and be sure to contact your doctor if: 
· You still have sleep apnea even though you have made lifestyle changes. · You are thinking of trying a device such as CPAP. · You are having problems using a CPAP or similar machine. Where can you learn more? Go to 365looks (Coqueta.me). Enter T025 in the search box to learn more about \"Sleep Apnea: After Your Visit. \"  
© 0081-9857 Healthwise, Incorporated. Care instructions adapted under license by [facility] [employer] (which disclaims liability or warranty for this information). This care instruction is for use with your licensed healthcare professional. If you have questions about a medical condition or this instruction, always ask your healthcare professional. Lalo Slider any warranty or liability for your use of this information. PROPER SLEEP HYGIENE What to avoid · Do not have drinks with caffeine, such as coffee or black tea, for 8 hours before bed. · Do not smoke or use other types of tobacco near bedtime. Nicotine is a stimulant and can keep you awake. · Avoid drinking alcohol late in the evening, because it can cause you to wake in the middle of the night. · Do not eat a big meal close to bedtime. If you are hungry, eat a light snack. · Do not drink a lot of water close to bedtime, because the need to urinate may wake you up during the night. · Do not read or watch TV in bed. Use the bed only for sleeping and sexual activity. What to try · Go to bed at the same time every night, and wake up at the same time every morning. Do not take naps during the day. · Keep your bedroom quiet, dark, and cool. · Get regular exercise, but not within 3 to 4 hours of your bedtime. Mirlande Naranjo · Sleep on a comfortable pillow and mattress.  
· If watching the clock makes you anxious, turn it facing away from you so you cannot see the time. · If you worry when you lie down, start a worry book. Well before bedtime, write down your worries, and then set the book and your concerns aside. · Try meditation or other relaxation techniques before you go to bed. · If you cannot fall asleep, get up and go to another room until you feel sleepy. Do something relaxing. Repeat your bedtime routine before you go to bed again. · Make your house quiet and calm about an hour before bedtime. Turn down the lights, turn off the TV, log off the computer, and turn down the volume on music. This can help you relax after a busy day. Drowsy Driving [employer] | [employer] cites drowsiness as a causing factor in more than 470,417 police reported crashes annually, resulting in 76,000 injuries and 1,500 deaths. Other surveys suggest 55% of people polled have driven while drowsy in the past year, 23% had fallen asleep but not crashed, 3% crashed, and 2% had and accident due to drowsy driving. Who is at risk? Young Drivers: One study of drowsy driving accidents states that 55% of the drivers were under 25 years. Of those, 75% were male. Shift Workers and Travelers: People who work overnight or travel across time zones frequently are at higher risk of experiencing Circadian Rhythm Disorders. They are trying to work and function when their body is programed to sleep. Sleep Deprived: Lack of sleep has a serious impact on your ability to pay attention or focus on a task. Consistently getting less than the average of 8 hours your body needs creates partial or cumulative sleep deprivation. Untreated Sleep Disorders: Sleep Apnea, Narcolepsy, R.L.S., and other sleep disorders (untreated) prevent a person from getting enough restful sleep. This leads to excessive daytime sleepiness and increases the risk for drowsy driving accidents by up to 7 times.  
Medications / Alcohol: Even over the counter medications can cause drowsiness. Medications that impair a drivers attention should have a warning label. Alcohol naturally makes you sleepy and on its own can cause accidents. Combined with excessive drowsiness its effects are amplified. Signs of Drowsy Driving: * You don't remember driving the last few miles * You may drift out of your sheyla * You are unable to focus and your thoughts wander * You may yawn more often than normal 
 * You have difficulty keeping your eyes open / nodding off * Missing traffic signs, speeding, or tailgating Prevention-  
Good sleep hygiene, lifestyle and behavioral choices have the most impact on drowsy driving. There is no substitute for sleep and the average person requires 8 hours nightly. If you find yourself driving drowsy, stop and sleep. Consider the sleep hygiene tips provided during your visit as well. Medication Refill Policy: Refills for all medications require 1 week advance notice. Please have your pharmacy fax a refill request. We are unable to fax, or call in \"controled substance\" medications and you will need to pick these prescriptions up from our office. MiName Activation Thank you for requesting access to MiName. Please follow the instructions below to securely access and download your online medical record. MiName allows you to send messages to your doctor, view your test results, renew your prescriptions, schedule appointments, and more. How Do I Sign Up? 1. In your internet browser, go to [email or web address] Devicehart. 2. Click on the First Time User? Click Here link in the Sign In box. You will see the New Member Sign Up page. 3. Enter your MiName Access Code exactly as it appears below. You will not need to use this code after youve completed the sign-up process. If you do not sign up before the expiration date, you must request a new code. MiName Access Code: Activation code not generated Current MiName Status: Active (This is the date your Fanplayr access code will ) 4. Enter the last four digits of your Social Security Number (xxxx) and Date of Birth (mm/dd/yyyy) as indicated and click Submit. You will be taken to the next sign-up page. 5. Create a Cuikert ID. This will be your Fanplayr login ID and cannot be changed, so think of one that is secure and easy to remember. 6. Create a Fanplayr password. You can change your password at any time. 7. Enter your Password Reset Question and Answer. This can be used at a later time if you forget your password. 8. Enter your e-mail address. You will receive e-mail notification when new information is available in [address] 9. Click Sign Up. You can now view and download portions of your medical record. 10. Click the Download Summary menu link to download a portable copy of your medical information. Additional Information If you have questions, please call [phone]. Remember, Fanplayr is NOT to be used for urgent needs. For medical emergencies, dial 911.

## 2021-05-24 ENCOUNTER — OFFICE VISIT (OUTPATIENT)
Dept: SLEEP MEDICINE | Age: 41
End: 2021-05-24

## 2021-05-24 DIAGNOSIS — G47.33 OSA (OBSTRUCTIVE SLEEP APNEA): Primary | ICD-10-CM

## 2021-05-24 NOTE — PROGRESS NOTES
S>Sabas Cantu is a 42 y.o. male seen today to receive a home sleep testing unit (HST). · Patient was educated on proper hookup and operation of the HST via detailed instruction sheet (per COVID-19 precautions)  · Instruction forms with after hours contact and documentation were signed. O>    There were no vitals taken for this visit. A>  No diagnosis found. P>  · General information regarding operations and maintenance of the device was provided. · Follow-up appointment was made to return the HST. He will be contacted once the results have been reviewed. · He was asked to contact our office for any problems regarding his home sleep test study.

## 2021-05-25 ENCOUNTER — OFFICE VISIT (OUTPATIENT)
Dept: SLEEP MEDICINE | Age: 41
End: 2021-05-25

## 2021-05-25 ENCOUNTER — HOSPITAL ENCOUNTER (OUTPATIENT)
Dept: SLEEP MEDICINE | Age: 41
Discharge: HOME | End: 2021-05-25
Payer: COMMERCIAL

## 2021-05-25 DIAGNOSIS — G47.33 OSA (OBSTRUCTIVE SLEEP APNEA): Primary | ICD-10-CM

## 2021-05-25 PROCEDURE — 95806 SLEEP STUDY UNATT&RESP EFFT: CPT | Performed by: INTERNAL MEDICINE

## 2021-05-26 ENCOUNTER — TELEPHONE (OUTPATIENT)
Dept: SLEEP MEDICINE | Age: 41
End: 2021-05-26

## 2021-05-26 DIAGNOSIS — G47.33 OSA (OBSTRUCTIVE SLEEP APNEA): Primary | ICD-10-CM

## 2021-05-26 DIAGNOSIS — Z11.52 ENCOUNTER FOR SCREENING FOR COVID-19: ICD-10-CM

## 2021-05-26 NOTE — TELEPHONE ENCOUNTER
Results of Sleep Testing reviewed with patient who agrees to return for an attended titration due to severe prolonged hypoxemia noted on diagnostic testing. An APAP prescription was written for interim use due to severity of WALLY and follow-up discussed with patient. Patient encouraged to call if there were any further questions regarding sleep symptoms. Encounter Diagnoses   Name Primary?  WALLY (obstructive sleep apnea) Yes    Encounter for screening for COVID-19        Orders Placed This Encounter    AMB SUPPLY ORDER     Diagnosis: Obstructive Sleep Apnea ICD-10 Code (G47.33)    Positive Airway Pressure Therapy: Duration of need: 99 months. ResMed APAP Device with Heated Humidifer [id number] / [id number]. Minimum Pressure: 4 cmH2O, Maximum Pressure: 20 cmH2O.  Nasal Cushion (Replace) 2 per month.  Nasal Interface Mask 1 every 3 months.  Headgear 1 every 6 months.  Filter(s) Disposable 2 per month.  Filter(s) Non-Disposable 1 every 6 months. [address] [facility] for [employer] (Replace) 1 every 6 months.  Tubing with heating element 1 every 3 months. Perform Mask Fitting per patient preference and comfort - replace as above. Ike Fernandez MD, FAASM; NPI: [id number]  Electronically signed. 05/26/21    NOVEL CORONAVIRUS (COVID-19)     Standing Status:   Future     Number of Occurrences:   1     Standing Expiration Date:   8/26/2021     Scheduling Instructions:      1) Due to current limited availability of the COVID-19 PCR test, tests will be prioritized and may not be completed.              2) Order only if the test result will change clinical management or necessary for a return to mission-critical employment decision.              3) Print and instruct patient to adhere to CDC home isolation program. (Link Above)              4) Set up or refer patient for a monitoring program.              5) Have patient sign up for and leverage Loco2t (if not previously done). Order Specific Question:   Status     Answer:   Asymptomatic/Surveillance(e.g. pre-op/pre-procedure/pre-delivery/transfer)     Order Specific Question:   Reason for Test     Answer:   Upcoming elective surgery/procedure/delivery, return to work, or discharge to another facility    SLEEP LAB (PAP TITRATION)     Standing Status:   Future     Standing Expiration Date:   8/26/2021     Scheduling Instructions:      Start on CPAP Switch to Bi-Level if Hypoxemia noted on CPAP following resolution of events.      Order Specific Question:   Reason for Exam     Answer:   WALLY

## 2021-05-27 ENCOUNTER — DOCUMENTATION ONLY (OUTPATIENT)
Dept: SLEEP MEDICINE | Age: 41
End: 2021-05-27

## 2021-05-27 ENCOUNTER — TELEPHONE (OUTPATIENT)
Dept: SLEEP MEDICINE | Age: 41
End: 2021-05-27

## 2021-05-27 NOTE — TELEPHONE ENCOUNTER
Reviewed sleep study results with patient. He expressed understanding and is willing to proceed with a trial of PAP. Reviewed sleep study results with patient. He expressed understanding and is willing to proceed with a CPAP Titration.

## 2021-06-10 ENCOUNTER — OFFICE VISIT (OUTPATIENT)
Dept: SLEEP MEDICINE | Age: 41
End: 2021-06-10

## 2021-06-10 DIAGNOSIS — G47.33 OSA (OBSTRUCTIVE SLEEP APNEA): Primary | ICD-10-CM

## 2021-06-10 NOTE — PROGRESS NOTES
Tech in appropriate PPE. Patient taken to room. Nasal Swab  COVID Test explained to patient to be done prior to titration study. Swab of both nostrils completed. Patient will be called for a detected result. PAP titration appointment confirmed, patient expressed understanding. Patient doesn't need assistance during study. (If patient does need assistance, caregiver must come with patient).

## 2021-06-11 LAB
SARS-COV-2, NAA 2 DAY TAT: NORMAL
SARS-COV-2, NAA: NOT DETECTED

## 2021-06-15 ENCOUNTER — HOSPITAL ENCOUNTER (OUTPATIENT)
Dept: SLEEP MEDICINE | Age: 41
Discharge: HOME | End: 2021-06-15
Payer: COMMERCIAL

## 2021-06-15 DIAGNOSIS — G47.33 OSA (OBSTRUCTIVE SLEEP APNEA): ICD-10-CM

## 2021-06-15 PROCEDURE — 95811 POLYSOM 6/>YRS CPAP 4/> PARM: CPT | Performed by: INTERNAL MEDICINE

## 2021-06-16 ENCOUNTER — DOCUMENTATION ONLY (OUTPATIENT)
Dept: SLEEP MEDICINE | Age: 41
End: 2021-06-16

## 2021-06-16 VITALS
HEIGHT: 69 IN | OXYGEN SATURATION: 97 % | SYSTOLIC BLOOD PRESSURE: 133 MMHG | HEART RATE: 78 BPM | WEIGHT: 258 LBS | BODY MASS INDEX: 38.21 KG/M2 | TEMPERATURE: 97.6 F | DIASTOLIC BLOOD PRESSURE: 74 MMHG

## 2021-06-16 NOTE — PROGRESS NOTES
217 [facility] [place]. [place], [address]  Tel.  [phone]  Fax. 100 [facility] 60  [place], 200 [address] [facility]  Tel.  [phone]  Fax. [phone] [address]  Tel.  [phone]  Fax. [phone]     Sleep Study Technical Notes        PRE-Test:  Stephanie Wood (: 1980) arrived in the lobby. Patient was greeted, temperature checked (97.6 ) and screening questions asked. The patient was taken to the Sleep Center and taken directly to his/her room. BP (133/74) and SaO2 (97) were taken. Weight per patient (250). Procedure explained to the patient and questions were answered. The patient expressed understanding of the procedure. Electrodes were applied without incident. The patient was placed in bed and the study was started. Acquisition Notes:   Lights off: 9:56pm     Respiratory events: obstructive, hypopnea, central   ECG:     PAP titration: CPAP & Bi-LEVEL   Desensitization Mask(s) Used: Res Med Air Fit N20 nasal-medium, Res Med [employer] F30i full face mask - medium cushion   Other comments: Avoidence of the supine position was suggested. POST Test:   Patient was awakened. Mask & electrodes were removed. The patient was discharged after answering the Post Sleep Questionnaire. Patient stated that he was alert and ok to drive.  Equipment and room cleaned per infection control policy.

## 2021-06-27 ENCOUNTER — HOSPITAL ENCOUNTER (EMERGENCY)
Age: 41
Discharge: HOME | End: 2021-06-27
Attending: EMERGENCY MEDICINE
Payer: COMMERCIAL

## 2021-06-27 ENCOUNTER — APPOINTMENT (OUTPATIENT)
Dept: CT IMAGING | Age: 41
End: 2021-06-27
Attending: EMERGENCY MEDICINE
Payer: COMMERCIAL

## 2021-06-27 VITALS
HEIGHT: 69 IN | RESPIRATION RATE: 16 BRPM | SYSTOLIC BLOOD PRESSURE: 109 MMHG | DIASTOLIC BLOOD PRESSURE: 52 MMHG | TEMPERATURE: 97.9 F | WEIGHT: 258.6 LBS | OXYGEN SATURATION: 95 % | BODY MASS INDEX: 38.3 KG/M2 | HEART RATE: 78 BPM

## 2021-06-27 DIAGNOSIS — M54.9 OTHER ACUTE BACK PAIN: Primary | ICD-10-CM

## 2021-06-27 DIAGNOSIS — R10.9 FLANK PAIN: ICD-10-CM

## 2021-06-27 LAB
ALBUMIN SERPL-MCNC: 3.7 G/DL (ref 3.5–5)
ALBUMIN/GLOB SERPL: 1 {RATIO} (ref 1.1–2.2)
ALP SERPL-CCNC: 93 U/L (ref 45–117)
ALT SERPL-CCNC: 39 U/L (ref 12–78)
ANION GAP SERPL CALC-SCNC: 5 MMOL/L (ref 5–15)
APPEARANCE UR: CLEAR
AST SERPL-CCNC: 14 U/L (ref 15–37)
BACTERIA URNS QL MICRO: NEGATIVE /HPF
BASOPHILS # BLD: 0.1 K/UL (ref 0–0.1)
BASOPHILS NFR BLD: 1 % (ref 0–1)
BILIRUB SERPL-MCNC: 0.5 MG/DL (ref 0.2–1)
BILIRUB UR QL: NEGATIVE
BUN SERPL-MCNC: 16 MG/DL (ref 6–20)
BUN/CREAT SERPL: 14 (ref 12–20)
CALCIUM SERPL-MCNC: 9 MG/DL (ref 8.5–10.1)
CHLORIDE SERPL-SCNC: 103 MMOL/L (ref 97–108)
CO2 SERPL-SCNC: 29 MMOL/L (ref 21–32)
COLOR UR: ABNORMAL
CREAT SERPL-MCNC: 1.13 MG/DL (ref 0.7–1.3)
DIFFERENTIAL METHOD BLD: ABNORMAL
EOSINOPHIL # BLD: 0.3 K/UL (ref 0–0.4)
EOSINOPHIL NFR BLD: 3 % (ref 0–7)
EPITH CASTS URNS QL MICRO: ABNORMAL /LPF
ERYTHROCYTE [DISTWIDTH] IN BLOOD BY AUTOMATED COUNT: 14.1 % (ref 11.5–14.5)
GLOBULIN SER CALC-MCNC: 3.7 G/DL (ref 2–4)
GLUCOSE SERPL-MCNC: 259 MG/DL (ref 65–100)
GLUCOSE UR STRIP.AUTO-MCNC: 500 MG/DL
HCT VFR BLD AUTO: 40.9 % (ref 36.6–50.3)
HGB BLD-MCNC: 13.8 G/DL (ref 12.1–17)
HGB UR QL STRIP: NEGATIVE
HYALINE CASTS URNS QL MICRO: ABNORMAL /LPF (ref 0–5)
IMM GRANULOCYTES # BLD AUTO: 0.1 K/UL (ref 0–0.04)
IMM GRANULOCYTES NFR BLD AUTO: 1 % (ref 0–0.5)
KETONES UR QL STRIP.AUTO: NEGATIVE MG/DL
LEUKOCYTE ESTERASE UR QL STRIP.AUTO: NEGATIVE
LIPASE SERPL-CCNC: 86 U/L (ref 73–393)
LYMPHOCYTES # BLD: 2.1 K/UL (ref 0.8–3.5)
LYMPHOCYTES NFR BLD: 28 % (ref 12–49)
MCH RBC QN AUTO: 28 PG (ref 26–34)
MCHC RBC AUTO-ENTMCNC: 33.7 G/DL (ref 30–36.5)
MCV RBC AUTO: 83 FL (ref 80–99)
MONOCYTES # BLD: 0.6 K/UL (ref 0–1)
MONOCYTES NFR BLD: 8 % (ref 5–13)
NEUTS SEG # BLD: 4.4 K/UL (ref 1.8–8)
NEUTS SEG NFR BLD: 59 % (ref 32–75)
NITRITE UR QL STRIP.AUTO: NEGATIVE
NRBC # BLD: 0 K/UL (ref 0–0.01)
NRBC BLD-RTO: 0 PER 100 WBC
PH UR STRIP: 5.5 [PH] (ref 5–8)
PLATELET # BLD AUTO: 179 K/UL (ref 150–400)
PMV BLD AUTO: 10 FL (ref 8.9–12.9)
POTASSIUM SERPL-SCNC: 4.3 MMOL/L (ref 3.5–5.1)
PROT SERPL-MCNC: 7.4 G/DL (ref 6.4–8.2)
PROT UR STRIP-MCNC: 30 MG/DL
RBC # BLD AUTO: 4.93 M/UL (ref 4.1–5.7)
RBC #/AREA URNS HPF: ABNORMAL /HPF (ref 0–5)
SODIUM SERPL-SCNC: 137 MMOL/L (ref 136–145)
SP GR UR REFRACTOMETRY: 1.02 (ref 1–1.03)
UA: UC IF INDICATED,UAUC: ABNORMAL
UROBILINOGEN UR QL STRIP.AUTO: 1 EU/DL (ref 0.2–1)
WBC # BLD AUTO: 7.4 K/UL (ref 4.1–11.1)
WBC URNS QL MICRO: ABNORMAL /HPF (ref 0–4)

## 2021-06-27 PROCEDURE — 36415 COLL VENOUS BLD VENIPUNCTURE: CPT

## 2021-06-27 PROCEDURE — 74011000250 HC RX REV CODE- 250: Performed by: EMERGENCY MEDICINE

## 2021-06-27 PROCEDURE — 74011250637 HC RX REV CODE- 250/637: Performed by: EMERGENCY MEDICINE

## 2021-06-27 PROCEDURE — 96375 TX/PRO/DX INJ NEW DRUG ADDON: CPT

## 2021-06-27 PROCEDURE — 96376 TX/PRO/DX INJ SAME DRUG ADON: CPT

## 2021-06-27 PROCEDURE — 96374 THER/PROPH/DIAG INJ IV PUSH: CPT

## 2021-06-27 PROCEDURE — 99284 EMERGENCY DEPT VISIT MOD MDM: CPT

## 2021-06-27 PROCEDURE — 80053 COMPREHEN METABOLIC PANEL: CPT

## 2021-06-27 PROCEDURE — 83690 ASSAY OF LIPASE: CPT

## 2021-06-27 PROCEDURE — 81001 URINALYSIS AUTO W/SCOPE: CPT

## 2021-06-27 PROCEDURE — 85025 COMPLETE CBC W/AUTO DIFF WBC: CPT

## 2021-06-27 PROCEDURE — 74176 CT ABD & PELVIS W/O CONTRAST: CPT

## 2021-06-27 PROCEDURE — 74011250636 HC RX REV CODE- 250/636: Performed by: EMERGENCY MEDICINE

## 2021-06-27 RX ORDER — DIAZEPAM 5 MG/1
5 TABLET ORAL
Qty: 20 TABLET | Refills: 0 | Status: SHIPPED | OUTPATIENT
Start: 2021-06-27 | End: 2021-10-06 | Stop reason: ALTCHOICE

## 2021-06-27 RX ORDER — DIPHENHYDRAMINE HCL 25 MG
25 CAPSULE ORAL
Status: COMPLETED | OUTPATIENT
Start: 2021-06-27 | End: 2021-06-27

## 2021-06-27 RX ORDER — LIDOCAINE 4 G/100G
1 PATCH TOPICAL EVERY 24 HOURS
Status: DISCONTINUED | OUTPATIENT
Start: 2021-06-27 | End: 2021-06-27 | Stop reason: HOSPADM

## 2021-06-27 RX ORDER — FENTANYL CITRATE 50 UG/ML
100 INJECTION, SOLUTION INTRAMUSCULAR; INTRAVENOUS
Status: COMPLETED | OUTPATIENT
Start: 2021-06-27 | End: 2021-06-27

## 2021-06-27 RX ORDER — DIAZEPAM 5 MG/1
5 TABLET ORAL
Status: COMPLETED | OUTPATIENT
Start: 2021-06-27 | End: 2021-06-27

## 2021-06-27 RX ORDER — KETOROLAC TROMETHAMINE 30 MG/ML
15 INJECTION, SOLUTION INTRAMUSCULAR; INTRAVENOUS
Status: COMPLETED | OUTPATIENT
Start: 2021-06-27 | End: 2021-06-27

## 2021-06-27 RX ORDER — HYDROCODONE BITARTRATE AND ACETAMINOPHEN 5; 325 MG/1; MG/1
1 TABLET ORAL
Qty: 12 TABLET | Refills: 0 | Status: SHIPPED | OUTPATIENT
Start: 2021-06-27 | End: 2021-06-30

## 2021-06-27 RX ORDER — ONDANSETRON 2 MG/ML
4 INJECTION INTRAMUSCULAR; INTRAVENOUS
Status: COMPLETED | OUTPATIENT
Start: 2021-06-27 | End: 2021-06-27

## 2021-06-27 RX ADMIN — KETOROLAC TROMETHAMINE 15 MG: 30 INJECTION, SOLUTION INTRAMUSCULAR; INTRAVENOUS at 18:37

## 2021-06-27 RX ADMIN — FENTANYL CITRATE 100 MCG: 50 INJECTION INTRAMUSCULAR; INTRAVENOUS at 16:44

## 2021-06-27 RX ADMIN — DIAZEPAM 5 MG: 5 TABLET ORAL at 17:11

## 2021-06-27 RX ADMIN — FENTANYL CITRATE 100 MCG: 50 INJECTION INTRAMUSCULAR; INTRAVENOUS at 18:37

## 2021-06-27 RX ADMIN — ONDANSETRON 4 MG: 2 INJECTION INTRAMUSCULAR; INTRAVENOUS at 16:43

## 2021-06-27 RX ADMIN — DIPHENHYDRAMINE HYDROCHLORIDE 25 MG: 25 CAPSULE ORAL at 19:22

## 2021-06-27 NOTE — ED NOTES
Dr. Christiane Hazel and Roopa Hope RN reviewed discharge instructions with the patient. The patient verbalized understanding. All questions and concerns were addressed. The patient declined a wheelchair and is discharged ambulatory in the care of family members with instructions and prescriptions in hand. Pt is alert and oriented x 4. Respirations are clear and unlabored. Pt with some itching to his upper extremities so given benadryl before d/c.

## 2021-06-27 NOTE — ED NOTES
Notified phlebotomist of hemolyzed lab draw, she states the pt is a difficult stick and will need an US guided lab draw when he is brought back to an ED room.

## 2021-06-30 ENCOUNTER — DOCUMENTATION ONLY (OUTPATIENT)
Dept: SLEEP MEDICINE | Age: 41
End: 2021-06-30

## 2021-06-30 ENCOUNTER — TELEPHONE (OUTPATIENT)
Dept: SLEEP MEDICINE | Age: 41
End: 2021-06-30

## 2021-06-30 DIAGNOSIS — G47.33 OSA (OBSTRUCTIVE SLEEP APNEA): Primary | ICD-10-CM

## 2021-06-30 NOTE — TELEPHONE ENCOUNTER
Patient called into the office on 06/30/2021 at 2:58pm stating that he received a [employer] notification that his results were ready for review, however he cannot see them. Please upload result and call patients to review.

## 2021-06-30 NOTE — TELEPHONE ENCOUNTER
Orders Placed This Encounter    AMB SUPPLY ORDER     Diagnosis: Sleep Apnea ICD-10 Code (G47.30); ICD-9 Code (780.57). Positive Airway Pressure Therapy: Duration of need: 99 months. ResMed VPAP Auto (VAuto Mode): Maximum IPAP: 25 cmH2O; Minimum EPAP: 9 cmH2O; PS: 6 cmH2O. Ramp Time: 30 Minutes. CPAP mask -  As fitted during titration OR patient preference, headgear, tubing, and filter;  heated humidifier; wireless modem. Remote monitoring enrollment. Dennis Silveira MD, FAASM; NPI: [id number]  Electronically signed. 06/30/21

## 2021-07-01 ENCOUNTER — DOCUMENTATION ONLY (OUTPATIENT)
Dept: SLEEP MEDICINE | Age: 41
End: 2021-07-01

## 2021-07-02 NOTE — ED PROVIDER NOTES
EMERGENCY DEPARTMENT HISTORY AND PHYSICAL EXAM      Date: 6/27/2021  Patient Name: Garett Victor    History of Presenting Illness     Chief Complaint   Patient presents with    Flank Pain     Ambulatory into the ED with c/o Rt flank/RUQ abdominal pain x 3-4 days. Denies CP, SOB, abdominal pain, n/v/d, urinary sx. No obvious distress noted.  Abdominal Pain       History Provided By: Patient    HPI: Garett Victor, 42 y.o. male presents to the ED with cc of flank pain. Patient states pain began approximately 4 days ago in his right flank and right upper quadrant. He states initially he thought maybe he had pulled a muscle but since the pain has not alleviated and has worsened he came into the emergency department tonight for evaluation. He states the pain is currently rated 8 out of 10 sharp stabbing and worse with certain movements. There has been no fever or chills. He denies any chest pain or shortness of breath. He denies any cough or cold symptoms. There has been no nausea, vomiting or diarrhea. Denies any gross hematuria. He states he does have a history of prior kidney stones he also has a history of previous back issues related to herniated disc. There is been no pain or swelling lower extremities. He denies any melena, hematochezia or bright red blood per rectum. There are no other complaints, changes, or physical findings at this time. PCP: Kalyn Baca MD    No current facility-administered medications on file prior to encounter. Current Outpatient Medications on File Prior to Encounter   Medication Sig Dispense Refill    testosterone cypionate (DEPOTESTOTERONE CYPIONATE) 200 mg/mL injection INJECT 1/2 ML (100 MG) INTRAMUSCULARLY EVERY WEEK 2 mL 4    levothyroxine (SYNTHROID) 112 mcg tablet TAKE 1 TABLET BY MOUTH ONCE DAILY BEFORE BREAKFAST 90 Tab 0    sertraline (ZOLOFT) 100 mg tablet Take 1 Tab by mouth daily.  30 Tab 5    insulin glargine (Lantus U-100 Insulin) 100 unit/mL injection INJECT 80 UNITS SUBCUTANEOUSLY IN THE MORNING AND 80 UNITS AT NIGHT 60 mL 3    flash glucose sensor (FreeStyle Mikal 2 Sensor) kit Change sensor every 14 days 2 Kit 3    flash glucose scanning reader (FreeStyle Mikal 2 [place]) misc Change sensor every 14 days 1 Each 0    busPIRone (BUSPAR) 15 mg tablet Take 1 Tab by mouth two (2) times a day. 60 Tab 3    cholecalciferol (Vitamin D3) (5000 Units/125 mcg) tab tablet Take 1 Tab by mouth daily. 90 Tab 1    Insulin Syringe-Needle U-100 (BD Insulin Syringe) 1 mL 25 x 1\" syrg Use for drawing up/injecting testosterone once weekly. 100 Each 3    Syringe with Needle, Disp, 1 mL 25 gauge x 5/8\" syrg Use with testosterone once every week 50 Syringe 3    lisinopriL (PRINIVIL, ZESTRIL) 10 mg tablet Take 2 Tabs by mouth daily. 60 Tab 0    rosuvastatin (CRESTOR) 20 mg tablet Take 1 tablet by mouth nightly 30 Tab 0    insulin regular (NOVOLIN R, HUMULIN R) 100 unit/mL injection 20-38 units with breakfast and 20-38 units with dinner plus correction. 160 units per day max (Patient taking differently: 20-34 units with breakfast,20-34 units with lunch and  20-34 units with dinner plus correction. 160 units per day max) 5 Vial 3    metFORMIN (GLUCOPHAGE) 500 mg tablet TAKE 1 TABLET BY MOUTH TWICE DAILY WITH MEALS 180 Tab 3    clomiPHENE (CLOMID) 50 mg tablet One pill every other day (Patient not taking: Reported on 5/20/2021) 15 Tab 5    LORazepam (ATIVAN) 1 mg tablet Take 1 Tab by mouth every four (4) hours as needed for Anxiety. Max Daily Amount: 6 mg. 30 Tab 5    aluminum & magnesium hydroxide-simethicone (Maalox Maximum Strength) 400-400-40 mg/5 mL suspension Take 10 mL by mouth every six (6) hours as needed for Indigestion. (Patient not taking: Reported on 5/20/2021)      Omeprazole delayed release (PRILOSEC D/R) 20 mg tablet Take 1 Tab by mouth daily.  20 Tab 0    clopidogrel (PLAVIX) 75 mg tab TAKE 1 TABLET BY MOUTH ONCE DAILY      nitroglycerin (NITROSTAT) 0.4 mg SL tablet Take 1 Tab by mouth every five (5) minutes as needed for Chest Pain. Sit down then put one tab under the tongue every 5 minutes as needed 1 Bottle 0    metoprolol succinate (TOPROL-XL) 25 mg XL tablet Take 1 Tab by mouth daily. (Patient taking differently: Take 25 mg by mouth two (2) times a day.) 90 Tab 3    aspirin delayed-release 81 mg tablet Take 1 Tab by mouth daily. 27 Tab 0       Past History     Past Medical History:  Past Medical History:   Diagnosis Date    Anxiety     Chronic headaches 2007    Depression     Diabetes ([address]     GERD (gastroesophageal reflux disease)     Hypercholesterolemia     Hypertension     Thyroid disease     hypothyroid    Unspecified sleep apnea     does not use CPAP       Past Surgical History:  Past Surgical History:   Procedure Laterality Date    HX CHOLECYSTECTOMY  14    Dr Zaina Young    HX HEENT      wisdom teeth removed    HX ORTHOPAEDIC Left 2012    carpel tunnel       Family History:  Family History   Problem Relation Age of Onset    Diabetes Mother     Heart Disease Father     Cancer Father     Diabetes Father     Diabetes Paternal Aunt     Diabetes Maternal Grandmother     Thyroid Cancer Maternal Grandmother     Kidney Disease Maternal Grandmother     Arthritis Maternal Grandmother     Heart Disease Maternal Grandfather     High Cholesterol Maternal Grandfather     Hypertension Maternal Grandfather     Diabetes Paternal Grandmother     Hemophilia Paternal Grandfather        Social History:  Social History     Tobacco Use    Smoking status: Former Smoker     Packs/day: 0.00     Years: 14.00     Pack years: 0.00     Quit date: 2014     Years since quittin.5    Smokeless tobacco: Never Used   Substance Use Topics    Alcohol use: No    Drug use: No       Allergies:   Allergies   Allergen Reactions    Morphine Nausea and Vomiting    Penicillin G Swelling    Percocet [Oxycodone-Acetaminophen] Hives and Nausea and Vomiting    Sulfa (Sulfonamide Antibiotics) Swelling    Tramadol Nausea Only     Nausea and headache           Review of Systems   Review of Systems   Constitutional: Negative. Negative for appetite change, chills, fatigue and fever. HENT: Negative. Negative for congestion, rhinorrhea, sinus pressure and sore throat. Eyes: Negative. Respiratory: Negative. Negative for cough, choking, chest tightness, shortness of breath and wheezing. Cardiovascular: Negative. Negative for chest pain, palpitations and leg swelling. Gastrointestinal: Positive for abdominal pain (RUQ has had prior cholecystectomy ). Negative for constipation, diarrhea, nausea and vomiting. Endocrine: Negative. Genitourinary: Positive for flank pain (right). Negative for difficulty urinating, dysuria and urgency. Skin: Negative. Neurological: Negative. Negative for dizziness, speech difficulty, weakness, light-headedness, numbness and headaches. Psychiatric/Behavioral: Negative. All other systems reviewed and are negative. Physical Exam   Physical Exam  Vitals and nursing note reviewed. Constitutional:       General: He is not in acute distress. Appearance: He is well-developed. He is obese. He is not diaphoretic. Comments: Appears uncomfortable      HENT:      Head: Normocephalic and atraumatic. Mouth/Throat:      Mouth: Mucous membranes are moist.      Pharynx: No oropharyngeal exudate. Eyes:      Extraocular Movements: Extraocular movements intact. Conjunctiva/sclera: Conjunctivae normal.      Pupils: Pupils are equal, round, and reactive to light. Neck:      Vascular: No JVD. Trachea: No tracheal deviation. Cardiovascular:      Rate and Rhythm: Normal rate and regular rhythm. Heart sounds: Normal heart sounds. No murmur heard. Pulmonary:      Effort: Pulmonary effort is normal. No respiratory distress. Breath sounds: Normal breath sounds. No stridor.  No wheezing or rales. Abdominal:      General: A surgical scar is present. Bowel sounds are normal. There is no distension. Palpations: Abdomen is soft. Tenderness: There is no abdominal tenderness. There is right CVA tenderness. There is no guarding or rebound. Negative signs include Rovsing's sign, McBurney's sign and psoas sign. Musculoskeletal:         General: Tenderness (right upper lumbar paraspinal muscles ) present. Normal range of motion. Cervical back: Normal range of motion and neck supple. Right lower leg: No edema. Left lower leg: No edema. Skin:     General: Skin is warm and dry. Capillary Refill: Capillary refill takes less than 2 seconds. Findings: No rash. Neurological:      Mental Status: He is alert and oriented to person, place, and time. Cranial Nerves: No cranial nerve deficit.       Comments: No gross motor or sensory deficits    Psychiatric:         Mood and Affect: Mood normal.         Behavior: Behavior normal.         Diagnostic Study Results     Labs -  CBC WITH AUTOMATED DIFF (Final result)   Component (Lab Inquiry)  Collection Time Result Time WBC RBC HGB HCT MCV   06/27/21 15:33:00 06/27/21 15:43:33 7.4 4.93 13.8 40.9 83.0       Collection Time Result Time MCH MCHC RDW PLT MPLV   06/27/21 15:33:00 06/27/21 15:43:33 28.0 33.7 14.1 179 10.0       Collection Time Result Time NRBC ANRBC GRANS LYMPH MONOS   06/27/21 15:33:00 06/27/21 15:43:33 0.0 0.00 59 28 8       Collection Time Result Time EOS BASOS IG ABG ABL   06/27/21 15:33:00 06/27/21 15:43:33 3 1 1High  4.4 2.1       Collection Time Result Time ABM SALEEM ABB AIG DF   06/27/21 15:33:00 06/27/21 15:43:33 0.6 0.3 0.1 0.1High  AUTOMATED         Final result                          LIPASE (Final result)   Component (Lab Inquiry)  Collection Time Result Time LPSE   06/27/21 15:33:00 06/27/21 16:09:36 86         Final result                          Contains abnormal data METABOLIC PANEL, COMPREHENSIVE (Final result)   Component (Lab Inquiry)  Collection Time Result Time NA K CL CO2 AGAP   06/27/21 15:33:00 06/27/21 16:09:36 137 4.3 103 29 5       Collection Time Result Time GLU BUN CREA BUCR GFRAA   06/27/21 15:33:00 06/27/21 16:09:36 259High  16 1.13 14 >60       Collection Time Result Time GFRNA CA TBIL GPT SGOT   06/27/21 15:33:00 06/27/21 16:09:36 >60   Estimated GFR is calcu. .. 9.0 0.5 39 14Low        Collection Time Result Time AP TP ALB GLOB AGRAT   06/27/21 15:33:00 06/27/21 16:09:36 93 7.4 3.7 3.7 1.0Low        Final result                        Contains abnormal data URINALYSIS W/ REFLEX CULTURE (Final result)   Component (Lab Inquiry)  Collection Time Result Time COLOR APPRN REFSG ENZO PROTU   06/27/21 14:10:00 06/27/21 14:38:52 YELLOW/STRAW   Color Reference Range:. .. CLEAR 1.023 5.5 30Abnormal        Collection Time Result Time GLUCU KETU BILU BLDU UROU   06/27/21 14:10:00 06/27/21 14:38:52 500Abnormal  Negative Negative Negative 1.0       Collection Time Result Time DONNA LEUKU WBCU RBCU EPSU   06/27/21 14:10:00 06/27/21 14:38:52 Negative Negative 0-4 0-5 FEW   Epithelial cell catego. ..       Collection Time Result Time Sound[employer] Sale HYCST   06/27/21 14:10:00 06/27/21 14:38:52 Negative CULTURE NOT INDICATED BY UA RESULT 0-2       Final result                        Radiologic Studies -   CT ABD PELV WO CONT   Final Result   No acute process on noncontrast CT. No nephrolithiasis or hydronephrosis. CT Results  (Last 48 hours)    None        CXR Results  (Last 48 hours)    None          Medical Decision Making   I am the first provider for this patient. I reviewed the vital signs, available nursing notes, past medical history, past surgical history, family history and social history. Vital Signs-Reviewed the patient's vital signs.     Records Reviewed: Nursing Notes, Old Medical Records, Previous Radiology Studies and Previous Laboratory Studies, Hx of WALLY, followed by Endocrine for Hypogonadism, DM, previous herniated disc     Provider Notes (Medical Decision Making):   DDx- Kidney stone, UTI, Back strain, constipation, colitis     ED Course:   Initial assessment performed. The patients presenting problems have been discussed, and they are in agreement with the care plan formulated and outlined with them. I have encouraged them to ask questions as they arise throughout their visit. CT Abd/Pelvis neg for intra-abdominal etiology of pain. Will treat as musculoskeletal pain. Disposition:  DC home     DISCHARGE PLAN:  1. Discharge Medication List as of 6/27/2021  7:14 PM      START taking these medications    Details   diazePAM (Valium) 5 mg tablet Take 1 Tablet by mouth three (3) times daily as needed for Anxiety (spasm). Max Daily Amount: 15 mg., Normal, Disp-20 Tablet, R-0      HYDROcodone-acetaminophen (Norco) 5-325 mg per tablet Take 1 Tablet by mouth every six (6) hours as needed for Pain for up to 3 days. Max Daily Amount: 4 Tablets., Normal, Disp-12 Tablet, R-0         CONTINUE these medications which have NOT CHANGED    Details   testosterone cypionate (DEPOTESTOTERONE CYPIONATE) 200 mg/mL injection INJECT 1/2 ML (100 MG) INTRAMUSCULARLY EVERY WEEK, Normal, Disp-2 mL, R-4      levothyroxine (SYNTHROID) 112 mcg tablet TAKE 1 TABLET BY MOUTH ONCE DAILY BEFORE BREAKFAST, Normal, Disp-90 Tab, R-0      sertraline (ZOLOFT) 100 mg tablet Take 1 Tab by mouth daily. , Normal, Disp-30 Tab, R-5Please consider 90 day supplies to promote better adherence      insulin glargine (Lantus U-100 Insulin) 100 unit/mL injection INJECT 80 UNITS SUBCUTANEOUSLY IN THE MORNING AND 80 UNITS AT NIGHT, Normal, Disp-60 mL, R-3      flash glucose sensor (FreeStyle Mikal 2 Sensor) kit Change sensor every 14 days, Normal, Disp-2 Kit, R-3      flash glucose scanning reader (FreeStyle Mikal 2 [place]) misc Change sensor every 14 days, Normal, Disp-1 Each, R-0      busPIRone (BUSPAR) 15 mg tablet Take 1 Tab by mouth two (2) times a day., Normal, Disp-60 Tab, R-3      cholecalciferol (Vitamin D3) (5000 Units/125 mcg) tab tablet Take 1 Tab by mouth daily. , Normal, Disp-90 Tab, R-1      Insulin Syringe-Needle U-100 (BD Insulin Syringe) 1 mL 25 x 1\" syrg Use for drawing up/injecting testosterone once weekly. , Normal, Disp-100 Each,R-3      Syringe with Needle, Disp, 1 mL 25 gauge x 5/8\" syrg Use with testosterone once every week, Normal, Disp-50 Syringe,R-3      lisinopriL (PRINIVIL, ZESTRIL) 10 mg tablet Take 2 Tabs by mouth daily. , Normal, Disp-60 Tab,R-0      rosuvastatin (CRESTOR) 20 mg tablet Take 1 tablet by mouth nightly, Normal, Disp-30 Tab,R-0      insulin regular (NOVOLIN R, HUMULIN R) 100 unit/mL injection 20-38 units with breakfast and 20-38 units with dinner plus correction. 160 units per day max, Normal, Disp-5 Vial,R-3      metFORMIN (GLUCOPHAGE) 500 mg tablet TAKE 1 TABLET BY MOUTH TWICE DAILY WITH MEALS, Normal, Disp-180 Tab,R-3      clomiPHENE (CLOMID) 50 mg tablet One pill every other day, Normal, Disp-15 Tab, R-5      LORazepam (ATIVAN) 1 mg tablet Take 1 Tab by mouth every four (4) hours as needed for Anxiety. Max Daily Amount: 6 mg., Normal, Disp-30 Tab, R-5      aluminum & magnesium hydroxide-simethicone (Maalox Maximum Strength) 400-400-40 mg/5 mL suspension Take 10 mL by mouth every six (6) hours as needed for Indigestion. , Historical Med      Omeprazole delayed release (PRILOSEC D/R) 20 mg tablet Take 1 Tab by mouth daily. , Normal, Disp-20 Tab, R-0      clopidogrel (PLAVIX) 75 mg tab TAKE 1 TABLET BY MOUTH ONCE DAILY, Historical Med      nitroglycerin (NITROSTAT) 0.4 mg SL tablet Take 1 Tab by mouth every five (5) minutes as needed for Chest Pain. Sit down then put one tab under the tongue every 5 minutes as needed, Normal, Disp-1 Bottle, R-0      metoprolol succinate (TOPROL-XL) 25 mg XL tablet Take 1 Tab by mouth daily. , Print, Disp-90 Tab, R-3      aspirin delayed-release 81 mg tablet Take 1 Tab by mouth daily. , Print, Disp-30 Tab, R-0           2. Follow-up Information     Follow up With Specialties Details Why Contact Info    Carina Mata MD Internal Medicine   [id number]  [phone]          3. Return to ED if worse     Diagnosis     Clinical Impression:   1. Other acute back pain    2. Flank pain        Attestations:    Arliss Phoenix, DO    Please note that this dictation was completed with [employer], the computer voice recognition software. Quite often unanticipated grammatical, syntax, homophones, and other interpretive errors are inadvertently transcribed by the computer software. Please disregard these errors. Please excuse any errors that have escaped final proofreading. Thank you.

## 2021-07-09 NOTE — TELEPHONE ENCOUNTER
Mr Miguel Ángel Webb is going to reach out to [address] [place] [address] in regards to set up of his ordered V pap auto device.

## 2021-07-14 ENCOUNTER — TELEPHONE (OUTPATIENT)
Dept: ENDOCRINOLOGY | Age: 41
End: 2021-07-14

## 2021-07-14 NOTE — TELEPHONE ENCOUNTER
----- Message from Alan Gould sent at 7/14/2021  1:19 PM EDT -----  Regarding: /Telephone  General Message/Vendor Calls    Caller's first and last name Rich Covarrubias       Reason for call: speak with the nurse       Callback required yes/no and why: yes      Best contact number(s): [phone]      Details to clarify the request:Pt requesting to speak with Leslie Bar in regards to a letter stating the reason pt is not working .       Alan Gould

## 2021-07-14 NOTE — TELEPHONE ENCOUNTER
Patient asks that Sonja Winston write a letter explaining why he was taken out of work. He states that he had had a heart attack and neck pain. Advised that he should get the letters from his cardiologist and neurologist. He states that Sonja Winston took him out of work due to his \"health issues\" and would like a letter stating such. He may be applying for disability and needs the letter for child support. He said he can pick it up. Said OK to address it to : To Whom It May Concern.

## 2021-08-17 RX ORDER — BUTALBITAL, ACETAMINOPHEN AND CAFFEINE 300; 40; 50 MG/1; MG/1; MG/1
1 CAPSULE ORAL
COMMUNITY
End: 2021-10-06 | Stop reason: ALTCHOICE

## 2021-08-17 RX ORDER — CYCLOBENZAPRINE HCL 10 MG
10 TABLET ORAL
COMMUNITY
End: 2021-10-06 | Stop reason: ALTCHOICE

## 2021-08-17 RX ORDER — LISINOPRIL 20 MG/1
10 TABLET ORAL 2 TIMES DAILY
COMMUNITY
End: 2021-10-31

## 2021-08-17 NOTE — PERIOP NOTES
[facility]  Ambulatory Surgery Unit  Pre-operative Instructions    Surgery/Procedure Date  8/26            Tentative Arrival Time tbd      1. On the day of your surgery/procedure, please report to the Ambulatory Surgery Unit Registration Desk and sign in at your designated time. The Ambulatory Surgery Unit is located in [facility] on the [facility] side of the [facility] across from the [address] [place] [address]. Please have all of your health insurance cards and a photo ID. 2. You must have someone with you to drive you home, as you should not drive a car for 24 hours following anesthesia. Please make arrangements for a responsible adult friend or family member to stay with you for at least the first 24 hours after your surgery. 3. Do not have anything to eat or drink (including water, gum, mints, coffee, juice) after 11:59 PM  8/25. This may not apply to medications prescribed by your physician. (Please note below the special instructions with medications to take the morning of surgery, if applicable.)    4. We recommend you do not drink any alcoholic beverages for 24 hours before and after your surgery. 5. Contact your surgeons office for instructions on the following medications: non-steroidal anti-inflammatory drugs (i.e. Advil, Aleve), vitamins, and supplements. (Some surgeons will want you to stop these medications prior to surgery and others may allow you to take them)   **If you are currently taking Plavix, Coumadin, Aspirin and/or other blood-thinning agents, contact your surgeon for instructions. ** Your surgeon will partner with the physician prescribing these medications to determine if it is safe to stop or if you need to continue taking. Please do not stop taking these medications without instructions from your surgeon.     6. In an effort to help prevent surgical site infection, we ask that you shower with an anti-bacterial soap (i.e. Dial/Safeguard, or the soap provided to you at your preadmission testing appointment) for 3 days prior to and on the morning of surgery, using a fresh towel after each shower. (Please begin this process with fresh bed linens.) Do not apply any lotions, powders, or deodorants after the shower on the day of your procedure. If applicable, please do not shave the operative site for 48 hours prior to surgery. 7. Wear comfortable clothes. Wear glasses instead of contacts. Do not bring any jewelry or money (other than copays or fees as instructed). Do not wear make-up, particularly mascara, the morning of your surgery. Do not wear nail polish, particularly if you are having foot /hand surgery. Wear your hair loose or down, no ponytails, buns, stephie pins or clips. All body piercings must be removed. 8. You should understand that if you do not follow these instructions your surgery may be cancelled. If your physical condition changes (i.e. fever, cold or flu) please contact your surgeon as soon as possible. 9. It is important that you be on time. If a situation occurs where you may be late, or if you have any questions or problems, please call [phone].    10. Your surgery time may be subject to change. You will receive a phone call the day prior to surgery to confirm your arrival time. Special Instructions: Take all medications and inhalers, as prescribed, on the morning of surgery with a sip of water EXCEPT: diabetic meds (plavix/aspirin hold per Dr. Curry Hawkins and Dr. Dandre Baeza)      Insulin Dependent Diabetic patients: Take your diabetic medications as prescribed the day before surgery. Hold all diabetic medications the day of surgery. If you are scheduled to arrive for surgery after 8:00 AM, and your AM blood sugar is >200, please call Ambulatory Surgery. I understand a pre-operative phone call will be made to verify my surgery time.   In the event that I am not available, I give permission for a message to be left on my answering service and/or with another person? yes      Reviewed by phone with pt, verbalized understanding.   covid testing 8/23 between 7-1145am.  Quarantine post covid testing   ___________________      ___________________      ________________  (Signature of Patient)          (Witness)                   (Date and Time)

## 2021-08-17 NOTE — PERIOP NOTES
Pat call to patient. Has not been instructed on plavix/asa for surgery. Call to [facility] in Dr. Ordoñez Overall' office. She will ask Dr. Smita Jordan and update patient. If plavix/asa hold is required, [facility] will contact Dr. Sriram Kong office for clearance.

## 2021-08-19 ENCOUNTER — HOSPITAL ENCOUNTER (EMERGENCY)
Age: 41
Discharge: HOME | End: 2021-08-19
Attending: EMERGENCY MEDICINE
Payer: COMMERCIAL

## 2021-08-19 VITALS
BODY MASS INDEX: 39.15 KG/M2 | DIASTOLIC BLOOD PRESSURE: 83 MMHG | WEIGHT: 264.33 LBS | HEIGHT: 69 IN | SYSTOLIC BLOOD PRESSURE: 168 MMHG | RESPIRATION RATE: 18 BRPM | HEART RATE: 85 BPM | OXYGEN SATURATION: 100 % | TEMPERATURE: 98.3 F

## 2021-08-19 DIAGNOSIS — M54.12 CERVICAL RADICULOPATHY: ICD-10-CM

## 2021-08-19 DIAGNOSIS — M54.2 ACUTE NECK PAIN: Primary | ICD-10-CM

## 2021-08-19 LAB — TROPONIN I BLD-MCNC: <0.04 NG/ML (ref 0–0.08)

## 2021-08-19 PROCEDURE — 93005 ELECTROCARDIOGRAM TRACING: CPT

## 2021-08-19 PROCEDURE — 96374 THER/PROPH/DIAG INJ IV PUSH: CPT

## 2021-08-19 PROCEDURE — 74011250637 HC RX REV CODE- 250/637: Performed by: EMERGENCY MEDICINE

## 2021-08-19 PROCEDURE — 84484 ASSAY OF TROPONIN QUANT: CPT

## 2021-08-19 PROCEDURE — 99284 EMERGENCY DEPT VISIT MOD MDM: CPT

## 2021-08-19 PROCEDURE — 74011250636 HC RX REV CODE- 250/636: Performed by: EMERGENCY MEDICINE

## 2021-08-19 RX ORDER — HYDROCODONE BITARTRATE AND ACETAMINOPHEN 5; 325 MG/1; MG/1
2 TABLET ORAL
Status: COMPLETED | OUTPATIENT
Start: 2021-08-19 | End: 2021-08-19

## 2021-08-19 RX ORDER — IBUPROFEN 600 MG/1
600 TABLET ORAL
Qty: 20 TABLET | Refills: 0 | Status: SHIPPED | OUTPATIENT
Start: 2021-08-19 | End: 2021-10-06 | Stop reason: ALTCHOICE

## 2021-08-19 RX ORDER — HYDROCODONE BITARTRATE AND ACETAMINOPHEN 5; 325 MG/1; MG/1
2 TABLET ORAL
Qty: 15 TABLET | Refills: 0 | Status: SHIPPED | OUTPATIENT
Start: 2021-08-19 | End: 2021-08-23

## 2021-08-19 RX ORDER — KETOROLAC TROMETHAMINE 30 MG/ML
30 INJECTION, SOLUTION INTRAMUSCULAR; INTRAVENOUS
Status: COMPLETED | OUTPATIENT
Start: 2021-08-19 | End: 2021-08-19

## 2021-08-19 RX ADMIN — HYDROCODONE BITARTRATE AND ACETAMINOPHEN 2 TABLET: 5; 325 TABLET ORAL at 22:04

## 2021-08-19 RX ADMIN — KETOROLAC TROMETHAMINE 30 MG: 30 INJECTION, SOLUTION INTRAMUSCULAR; INTRAVENOUS at 22:05

## 2021-08-19 NOTE — PERIOP NOTES
Called to pt, lm for him to call back. Reached out to Dr. Krista yMers office to see if pt has set up an appt or if surgeon has requested blood thinner clearance. Per Tia no appt or blood thinner request at office. Asked Dr. Kalli Dickson if pt needed to be off blood thinners to proceed. He is fine with pt staying on blood thinner to proceed with this case. Pt called back, he has made an appt with Dr. Meghann Chavez, not til after surgery. I informed him to stay on blood thinner, not to take morning of surgery, he can take when he gets home. We reviewed medications for day of surgery. Pt verbalized understanding.

## 2021-08-20 ENCOUNTER — PATIENT OUTREACH (OUTPATIENT)
Dept: CASE MANAGEMENT | Age: 41
End: 2021-08-20

## 2021-08-20 LAB
ATRIAL RATE: 81 BPM
CALCULATED P AXIS, ECG09: 43 DEGREES
CALCULATED R AXIS, ECG10: 3 DEGREES
CALCULATED T AXIS, ECG11: 91 DEGREES
DIAGNOSIS, 93000: NORMAL
P-R INTERVAL, ECG05: 164 MS
Q-T INTERVAL, ECG07: 356 MS
QRS DURATION, ECG06: 90 MS
QTC CALCULATION (BEZET), ECG08: 413 MS
VENTRICULAR RATE, ECG03: 81 BPM

## 2021-08-20 NOTE — PROGRESS NOTES
ACM contacted patient for ER follow up . Unable to reach patient and left VM for return call with contact info provided for return call. 12:00  ACM attempted second outreach to the patient . Unable to reach patient and left VM for return call .      ACM will close this episode at this time due to inability to reach the patient

## 2021-08-20 NOTE — DISCHARGE INSTRUCTIONS
It was a pleasure taking care of you at [facility] Emergency Department today. We know that when you come to [facility], you are entrusting us with your health, comfort, and safety. Our physicians and nurses honor that trust, and we truly appreciate the opportunity to care for you and your loved ones. We also value your feedback. If you receive a survey about your Emergency Department experience today, please fill it out. We care about our patients' feedback, and we listen to what you have to say. Thank you!

## 2021-08-20 NOTE — ED NOTES
9:16- CC:Patient presents A/O x4  Complains that 2 days ago he felt a pop in his neck. States he has been getting severe headaches 9 out of 10. Has had pains in the past on the left side of neck but the pain on the right side is new. Hurts around neck and upper part of spine, pt states he has a bulging disk at C5, and C6. Buldging disk happened about 2 years ago per patient. Pt states he has been taking tylenol but states it doesn't really help.        10:13- Patient tolerated pain meds po and IV without difficulty.

## 2021-08-20 NOTE — ED PROVIDER NOTES
EMERGENCY DEPARTMENT HISTORY AND PHYSICAL EXAM      Date: 8/19/2021  Patient Name: Tanner Rodrigues  Patient Age and Sex: 42 y.o. male  MRN:  [id number]  CSN:  [id number]    History of Presenting Illness     Chief Complaint   Patient presents with    Neck Pain     ED visit posterior neck with radiation to upper back with pain - onset of sxs, few days ago, 3 days ago - reports turning my head quickly hearing a \" pop and worsening pain since this happened \" ;; hx of herniated disc;;       History Provided By: Patient    Ability to gather history was limited by:     HPI: Tanner Rodrigues, 42 y.o. male with history of obesity, poorly controlled diabetes, hypertension, CAD, complains of acute on chronic neck pain. Felt a popping sensation in the back of his neck a couple days ago while turning his head, this felt pain radiating into the right arm and into the back of the head. No specific neuro symptoms, no unusual weakness or numbness. Pain is moderate to severe, aching and tingling/burning in nature. Location:    Quality:      Severity:    Duration:   Timing:      Context:    Modifying factors:   Associated symptoms:     Past History      The patient's medical, surgical, and social history on file were reviewed by me today.      The family history was reviewed by me today and was non-contributory, unless otherwise specified below:    Past Medical History:  Past Medical History:   Diagnosis Date    Anxiety and depression     Bleeding of eye, left     8/17/21 pt reports receiving injections in right eye for beeding    Chronic headaches 2007    COVID-19 12/20/2020    Diabetes ([address]     GERD (gastroesophageal reflux disease)     Hypercholesterolemia     Hypertension     Hypothyroidism     MI (myocardial infarction) ([address]     as of 8/17/21 pt reports 8 stents total    WALLY on CPAP        Past Surgical History:  Past Surgical History:   Procedure Laterality Date    HX CARPAL TUNNEL RELEASE Left 2012    HX CARPAL TUNNEL RELEASE Right     CTE trigger thumb and index finger    HX HEART CATHETERIZATION      HX LAP CHOLECYSTECTOMY  14    Dr Saini Buys    HX WISDOM TEETH EXTRACTION         Family History:  Family History   Problem Relation Age of Onset    Diabetes Mother     Heart Disease Father     Cancer Father     Diabetes Father     Diabetes Paternal Aunt     Diabetes Maternal Grandmother     Thyroid Cancer Maternal Grandmother     Kidney Disease Maternal Grandmother     Arthritis Maternal Grandmother     Heart Disease Maternal Grandfather     High Cholesterol Maternal Grandfather     Hypertension Maternal Grandfather     Diabetes Paternal Grandmother     Hemophilia Paternal Grandfather        Social History:  Social History     Tobacco Use    Smoking status: Former Smoker     Packs/day: 0.00     Years: 14.00     Pack years: 0.00     Quit date: 2014     Years since quittin.6    Smokeless tobacco: Never Used   Substance Use Topics    Alcohol use: No    Drug use: No       Current Medications:  No current facility-administered medications on file prior to encounter. Current Outpatient Medications on File Prior to Encounter   Medication Sig Dispense Refill    lisinopriL (PRINIVIL, ZESTRIL) 20 mg tablet Take 10 mg by mouth two (2) times a day.  butalbital-acetaminophen-caff (Fioricet) -40 mg per capsule Take 1 Capsule by mouth two (2) times daily as needed for Headache.  cyclobenzaprine (FLEXERIL) 10 mg tablet Take 10 mg by mouth three (3) times daily as needed for Muscle Spasm(s).  levothyroxine (SYNTHROID) 112 mcg tablet TAKE 1 TABLET BY MOUTH ONCE DAILY BEFORE BREAKFAST 90 Tablet 2    busPIRone (BUSPAR) 15 mg tablet Take 1 tablet by mouth twice daily 60 Tablet 2    diazePAM (Valium) 5 mg tablet Take 1 Tablet by mouth three (3) times daily as needed for Anxiety (spasm).  Max Daily Amount: 15 mg. 20 Tablet 0    testosterone cypionate (Arther Allis CYPIONATE) 200 mg/mL injection INJECT 1/2 ML (100 MG) INTRAMUSCULARLY EVERY WEEK 2 mL 4    sertraline (ZOLOFT) 100 mg tablet Take 1 Tab by mouth daily. 30 Tab 5    insulin glargine (Lantus U-100 Insulin) 100 unit/mL injection INJECT 80 UNITS SUBCUTANEOUSLY IN THE MORNING AND 80 UNITS AT NIGHT (Patient taking differently: INJECT 90 UNITS SUBCUTANEOUSLY IN THE MORNING AND 90 UNITS AT NIGHT) 60 mL 3    flash glucose sensor (FreeStyle Mikal 2 Sensor) kit Change sensor every 14 days 2 Kit 3    flash glucose scanning reader (FreeStyle Mikal 2 [place]) misc Change sensor every 14 days 1 Each 0    cholecalciferol (Vitamin D3) (5000 Units/125 mcg) tab tablet Take 1 Tab by mouth daily. 90 Tab 1    Insulin Syringe-Needle U-100 (BD Insulin Syringe) 1 mL 25 x 1\" syrg Use for drawing up/injecting testosterone once weekly. 100 Each 3    Syringe with Needle, Disp, 1 mL 25 gauge x 5/8\" syrg Use with testosterone once every week 50 Syringe 3    rosuvastatin (CRESTOR) 20 mg tablet Take 1 tablet by mouth nightly 30 Tab 0    insulin regular (NOVOLIN R, HUMULIN R) 100 unit/mL injection 20-38 units with breakfast and 20-38 units with dinner plus correction. 160 units per day max (Patient taking differently: 30 units with breakfast,30 units with lunch and  30 units with dinner plus correction. 160 units per day max) 5 Vial 3    metFORMIN (GLUCOPHAGE) 500 mg tablet TAKE 1 TABLET BY MOUTH TWICE DAILY WITH MEALS 180 Tab 3    LORazepam (ATIVAN) 1 mg tablet Take 1 Tab by mouth every four (4) hours as needed for Anxiety. Max Daily Amount: 6 mg. 30 Tab 5    aluminum & magnesium hydroxide-simethicone (Maalox Maximum Strength) 400-400-40 mg/5 mL suspension Take 10 mL by mouth every six (6) hours as needed for Indigestion.  Omeprazole delayed release (PRILOSEC D/R) 20 mg tablet Take 1 Tab by mouth daily.  20 Tab 0    clopidogrel (PLAVIX) 75 mg tab TAKE 1 TABLET BY MOUTH ONCE DAILY      nitroglycerin (NITROSTAT) 0.4 mg SL tablet Take 1 Tab by mouth every five (5) minutes as needed for Chest Pain. Sit down then put one tab under the tongue every 5 minutes as needed (Patient not taking: Reported on 8/17/2021) 1 Bottle 0    metoprolol succinate (TOPROL-XL) 25 mg XL tablet Take 1 Tab by mouth daily. (Patient taking differently: Take 25 mg by mouth two (2) times a day.) 90 Tab 3    aspirin delayed-release 81 mg tablet Take 1 Tab by mouth daily. 30 Tab 0       Allergies: Allergies   Allergen Reactions    Morphine Nausea and Vomiting    Penicillin G Swelling    Percocet [Oxycodone-Acetaminophen] Hives and Nausea and Vomiting    Sulfa (Sulfonamide Antibiotics) Swelling    Tramadol Nausea Only     Nausea and headache       Review of Systems    A complete ROS was reviewed by me today and was negative, unless otherwise specified below:    Review of Systems   Constitutional: Negative for fatigue and fever. Respiratory: Negative for shortness of breath. Cardiovascular: Negative for chest pain. Gastrointestinal: Negative for abdominal pain. Musculoskeletal: Positive for neck pain. Neurological: Negative for weakness. All other systems reviewed and are negative. Physical Exam   Vital Signs  Patient Vitals for the past 8 hrs:   Temp Pulse Resp BP SpO2   08/19/21 2012 98.3 °F (36.8 °C) 85 18 (!) 168/83 100 %          Physical Exam  Vitals and nursing note reviewed. Constitutional:       General: He is not in acute distress. Appearance: He is obese. He is not ill-appearing. HENT:      Head: Normocephalic and atraumatic. Neck:     Musculoskeletal:      Cervical back: Normal range of motion and neck supple. No swelling, deformity or bony tenderness. Muscular tenderness present. No spinous process tenderness. Normal range of motion. Skin:     General: Skin is warm. Findings: No ecchymosis, erythema, rash or wound. Neurological:      General: No focal deficit present.       Mental Status: He is alert and oriented to person, place, and time. Sensory: Sensation is intact. Motor: Motor function is intact. No weakness. Psychiatric:         Mood and Affect: Mood normal.         Behavior: Behavior normal.         Cognition and Memory: Cognition normal.         Diagnostic Study Results   Labs  Recent Results (from the past 24 hour(s))   EKG, 12 LEAD, INITIAL    Collection Time: 08/19/21  8:14 PM   Result Value Ref Range    Ventricular Rate 81 BPM    Atrial Rate 81 BPM    P-R Interval 164 ms    QRS Duration 90 ms    Q-T Interval 356 ms    QTC Calculation (Bezet) 413 ms    Calculated P Axis 43 degrees    Calculated R Axis 3 degrees    Calculated T Axis 91 degrees    Diagnosis       Normal sinus rhythm  Left ventricular hypertrophy with repolarization abnormality  Cannot rule out Inferior infarct , age undetermined  When compared with ECG of 11-MAR-2020 23:50,  Non-specific change in ST segment in Lateral leads     POC TROPONIN-I    Collection Time: 08/19/21  8:36 PM   Result Value Ref Range    Troponin-I (POC) <0.04 0.00 - 0.08 ng/mL       Radiologic Studies  No orders to display     CT Results  (Last 48 hours)    None        CXR Results  (Last 48 hours)    None          Billable Procedures   Procedures    Cardiac monitoring was not ordered for this patient. Medical Decision Making     I reviewed the patient's most recent Emergency Dept notes and diagnostic tests in formulating my MDM on today's visit. Provider Notes (Medical Decision Making):   54-year-old male with acute on chronic pain in the posterior neck starting a couple days ago, felt a popping sensation, pain radiating to right upper extremity. Well-appearing, no distress. Obese, normal vital signs. Normal neurologic exam, no concerning weakness or numbness. No strokelike symptoms. No clinical concern at this time for acute spinal cord pathology, significant impingement of the spinal cord, or vascular injury such as carotid dissection or CVA.   No imaging is indicated. Benign H&P. [place] better after Norco and Toradol. Discharge home Rx Norco and ibuprofen. Ena Katz MD  10:45 PM  2021     Consults:    Social History     Tobacco Use    Smoking status: Former Smoker     Packs/day: 0.00     Years: 14.00     Pack years: 0.00     Quit date: 2014     Years since quittin.6    Smokeless tobacco: Never Used   Substance Use Topics    Alcohol use: No    Drug use: No       Medications Administered during ED course:  Medications   ketorolac (TORADOL) injection 30 mg (30 mg IntraVENous Given 21)   HYDROcodone-acetaminophen (NORCO) 5-325 mg per tablet 2 Tablet (2 Tablets Oral Given 21)          Prescriptions from today's ED visit:  Current Discharge Medication List      START taking these medications    Details   HYDROcodone-acetaminophen (Norco) 5-325 mg per tablet Take 2 Tablets by mouth every six (6) hours as needed for Pain for up to 3 days. Max Daily Amount: 8 Tablets. Qty: 15 Tablet, Refills: 0  Start date: 2021, End date: 2021    Associated Diagnoses: Acute neck pain; Cervical radiculopathy      ibuprofen (MOTRIN) 600 mg tablet Take 1 Tablet by mouth every six (6) hours as needed for Pain. Qty: 20 Tablet, Refills: 0  Start date: 2021            Diagnosis and Disposition     Disposition:  Discharged    Clinical Impression:   1. Acute neck pain    2. Cervical radiculopathy        Attestation:  I personally performed the services described in this documentation on this date 2021 for patient Rossi Lebron. Ena Katz MD        I was the first provider for this patient on this visit. To the best of my ability I reviewed relevant prior medical records, electrocardiograms, laboratories, and radiologic studies. The patient's presenting problems were discussed, and the patient was in agreement with the care plan formulated and outlined with them.       Ena Katz MD    Please note that this dictation was completed with Dragon voice recognition software. Quite often unanticipated grammatical, syntax, homophones, and other interpretive errors are inadvertently transcribed by the computer software. Please disregard these errors and excuse any errors that have escaped final proofreading.

## 2021-08-23 ENCOUNTER — HOSPITAL ENCOUNTER (OUTPATIENT)
Dept: PREADMISSION TESTING | Age: 41
Discharge: HOME | End: 2021-08-23
Payer: COMMERCIAL

## 2021-08-23 ENCOUNTER — TELEPHONE (OUTPATIENT)
Dept: ENDOCRINOLOGY | Age: 41
End: 2021-08-23

## 2021-08-23 ENCOUNTER — PATIENT OUTREACH (OUTPATIENT)
Dept: CASE MANAGEMENT | Age: 41
End: 2021-08-23

## 2021-08-23 ENCOUNTER — OFFICE VISIT (OUTPATIENT)
Dept: NEUROLOGY | Age: 41
End: 2021-08-23
Payer: COMMERCIAL

## 2021-08-23 ENCOUNTER — TELEPHONE (OUTPATIENT)
Dept: SLEEP MEDICINE | Age: 41
End: 2021-08-23

## 2021-08-23 VITALS
BODY MASS INDEX: 38.99 KG/M2 | WEIGHT: 264 LBS | SYSTOLIC BLOOD PRESSURE: 136 MMHG | RESPIRATION RATE: 17 BRPM | OXYGEN SATURATION: 97 % | DIASTOLIC BLOOD PRESSURE: 72 MMHG | HEART RATE: 76 BPM

## 2021-08-23 DIAGNOSIS — I63.81 MULTIPLE LACUNAR INFARCTS (HCC): ICD-10-CM

## 2021-08-23 DIAGNOSIS — R42 DIZZY SPELLS: Primary | ICD-10-CM

## 2021-08-23 DIAGNOSIS — I65.23 BILATERAL CAROTID ARTERY STENOSIS: ICD-10-CM

## 2021-08-23 PROCEDURE — 99215 OFFICE O/P EST HI 40 MIN: CPT | Performed by: PSYCHIATRY & NEUROLOGY

## 2021-08-23 PROCEDURE — U0003 INFECTIOUS AGENT DETECTION BY NUCLEIC ACID (DNA OR RNA); SEVERE ACUTE RESPIRATORY SYNDROME CORONAVIRUS 2 (SARS-COV-2) (CORONAVIRUS DISEASE [COVID-19]), AMPLIFIED PROBE TECHNIQUE, MAKING USE OF HIGH THROUGHPUT TECHNOLOGIES AS DESCRIBED BY CMS-2020-01-R: HCPCS

## 2021-08-23 NOTE — ASSESSMENT & PLAN NOTE
sounds more consistent with benign positional vertigo but given patient's complex medical history I think it is reasonable to check for carotid artery stenosis duplex studies will be ordered as well as MRI of the brain with special attention to IACs patient also has a known history of basal ganglia lacunar infarcts

## 2021-08-23 NOTE — TELEPHONE ENCOUNTER
8/23/2021  3:01 PM    Wanted to let you know that he had his appt with neurology and has been scheduled for a doppler scan for next month and will also be getting an MRI.   Can be reached at [phone]    Thanks,  Nuzhat Krueger

## 2021-08-23 NOTE — PROGRESS NOTES
[facility] 82  In Office FOLLOW-UP VISIT         Samantha Rosado is a 42 y.o. male who presents today for the following:  Chief Complaint   Patient presents with    Follow-up     doing rouhg; heart attack; not doing well, losing left eye sight, when he bends over it feels like it cuts off blood flow         ASSESSMENT AND PLAN  Patient is known to this practice. This is my first time seeing the patient. Chart and history reviewed in detail at today's office visit. 1. Dizzy spells  Assessment & Plan:    sounds more consistent with benign positional vertigo but given patient's complex medical history I think it is reasonable to check for carotid artery stenosis duplex studies will be ordered as well as MRI of the brain with special attention to IACs patient also has a known history of basal ganglia lacunar infarcts  Orders:  -     MRI BRAIN WO CONT; Future  2. Bilateral carotid artery stenosis  -     DUPLEX CAROTID BILATERAL; Future  3. Multiple lacunar infarcts [facility])  Assessment & Plan:   MRI of the brain has been ordered along with duplex studies    Patient has been encouraged to keep his risk factors under good control to include blood sugars, hypertension and cholesterol      Patient and/or family was given time to ask questions and voice concerns. I believe all questions concerns were adequately addressed at this  office visit. Patient and/or family also verbalized agreement and understanding of the above-stated plan    Patient remains a complex patient secondary to polypharmacy, significant comorbid conditions, and use of high-risk medications which complicate the decision making process related to patient's neurologic diagnosis    Follow-up and Dispositions    · Return in about 3 months (around 11/23/2021) for In office appointment. ICD-10-CM ICD-9-CM    1. Dizzy spells  R42 780.4 MRI BRAIN WO CONT   2.  Bilateral carotid artery stenosis  I65.23 433.10 DUPLEX CAROTID BILATERAL     433.30    3. Multiple lacunar infarcts (HCC)  I63.81 434.91          I attest that 40 minutes was spent on today's visit reviewing medical records and diagnostic testing deemed pertinent to this patient's care, along with direct time spent at patient's visit including the history, physical assessment and plan, discussing diagnosis and management along with documentation. HPI  Historical Data  Patient is known to the practice and was previously seen by Dr. Wilfred Riojas    Neurologic diagnosis  Headaches and migraines  He has been having headaches for a long time but has been having worsening headaches since 2012. He does have close to 20 headache days in a month. He has 2 kinds of headache, one but starts of the right occipital area which is a sharp headache that shoots to the right frontal.  He does also have headaches in the left side as well. His headaches can also be pounding and throbbing and the headache severity is between 3-10 out of 10. He does have associated photophobia, phonophobia and osmophobia. Each headache can last for 4-5 days. Risk Factors for headaches  Smoking: Denies denies  Coffee: Denies cups/day  Tea: Denies cups/day  Soda: 6 cans/day  Water: 1-2 glasses/day  Sleeps at 8-11 p.m. and wakes up at 5:30 aM. He does snore. He has been diagnosed with obstructive sleep apnea but did not get the CPAP machine because he lost his insurance. 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[employer]    Interim Data:     Dizziness  With position changes: lightheaded and bending and also when he stands up  Duration:  For the past several months  If gets up might get lightheaded  If puts head down can occur  Pre-syncope but no actual syncopal events  Denies any acute neurologic symptoms related to these events Other  Frequent chest pain and SOB   States cardiac he is aware thought perhaps more related to reflux   Has not needed to use nitroglycerin    Diabetes: blood sugar normally \"ok\" runs between 200-300  LT eye: \"getting shots in eyes\"  Denies numbness tingling or sensory loss in his lower extremities or in his hands          Pertinent diagnostic data    11/28/19    ECHO ADULT COMPLETE 11/29/2019 11/29/2019    Interpretation Summary  · Left Ventricle: Normal cavity size, systolic function (ejection fraction normal) and diastolic function. Moderate concentric hypertrophy. Estimated left ventricular ejection fraction is 61 - 65%. · Mitral Valve: Trace mitral valve regurgitation is present. Signed by: Mahnaz Arreola MD on 11/29/2019 12:26 PM      Results from Hospital Encounter encounter on 08/03/19    MRI BRAIN W WO CONT    Impression  IMPRESSION: Small bilateral remote basal ganglia lacunes . No acute lesion no  masses. Allergies   Allergen Reactions    Morphine Nausea and Vomiting    Penicillin G Swelling    Percocet [Oxycodone-Acetaminophen] Hives and Nausea and Vomiting    Sulfa (Sulfonamide Antibiotics) Swelling    Tramadol Nausea Only     Nausea and headache         Current Outpatient Medications   Medication Sig    ibuprofen (MOTRIN) 600 mg tablet Take 1 Tablet by mouth every six (6) hours as needed for Pain.  lisinopriL (PRINIVIL, ZESTRIL) 20 mg tablet Take 10 mg by mouth two (2) times a day.  butalbital-acetaminophen-caff (Fioricet) -40 mg per capsule Take 1 Capsule by mouth two (2) times daily as needed for Headache.  cyclobenzaprine (FLEXERIL) 10 mg tablet Take 10 mg by mouth three (3) times daily as needed for Muscle Spasm(s).     levothyroxine (SYNTHROID) 112 mcg tablet TAKE 1 TABLET BY MOUTH ONCE DAILY BEFORE BREAKFAST    busPIRone (BUSPAR) 15 mg tablet Take 1 tablet by mouth twice daily    diazePAM (Valium) 5 mg tablet Take 1 Tablet by mouth three (3) times daily as needed for Anxiety (spasm). Max Daily Amount: 15 mg.  testosterone cypionate (DEPOTESTOTERONE CYPIONATE) 200 mg/mL injection INJECT 1/2 ML (100 MG) INTRAMUSCULARLY EVERY WEEK    sertraline (ZOLOFT) 100 mg tablet Take 1 Tab by mouth daily.  insulin glargine (Lantus U-100 Insulin) 100 unit/mL injection INJECT 80 UNITS SUBCUTANEOUSLY IN THE MORNING AND 80 UNITS AT NIGHT (Patient taking differently: INJECT 90 UNITS SUBCUTANEOUSLY IN THE MORNING AND 90 UNITS AT NIGHT)    flash glucose sensor (FreeStyle Mikal 2 Sensor) kit Change sensor every 14 days    flash glucose scanning reader (FreeStyle Mikal 2 [place]) misc Change sensor every 14 days    cholecalciferol (Vitamin D3) (5000 Units/125 mcg) tab tablet Take 1 Tab by mouth daily.  Insulin Syringe-Needle U-100 (BD Insulin Syringe) 1 mL 25 x 1\" syrg Use for drawing up/injecting testosterone once weekly.  Syringe with Needle, Disp, 1 mL 25 gauge x 5/8\" syrg Use with testosterone once every week    rosuvastatin (CRESTOR) 20 mg tablet Take 1 tablet by mouth nightly    insulin regular (NOVOLIN R, HUMULIN R) 100 unit/mL injection 20-38 units with breakfast and 20-38 units with dinner plus correction. 160 units per day max (Patient taking differently: 30 units with breakfast,30 units with lunch and  30 units with dinner plus correction. 160 units per day max)    metFORMIN (GLUCOPHAGE) 500 mg tablet TAKE 1 TABLET BY MOUTH TWICE DAILY WITH MEALS    LORazepam (ATIVAN) 1 mg tablet Take 1 Tab by mouth every four (4) hours as needed for Anxiety. Max Daily Amount: 6 mg.  aluminum & magnesium hydroxide-simethicone (Maalox Maximum Strength) 400-400-40 mg/5 mL suspension Take 10 mL by mouth every six (6) hours as needed for Indigestion.  Omeprazole delayed release (PRILOSEC D/R) 20 mg tablet Take 1 Tab by mouth daily.     clopidogrel (PLAVIX) 75 mg tab TAKE 1 TABLET BY MOUTH ONCE DAILY    metoprolol succinate (TOPROL-XL) 25 mg XL tablet Take 1 Tab by mouth daily. (Patient taking differently: Take 25 mg by mouth two (2) times a day.)    aspirin delayed-release 81 mg tablet Take 1 Tab by mouth daily.  nitroglycerin (NITROSTAT) 0.4 mg SL tablet Take 1 Tab by mouth every five (5) minutes as needed for Chest Pain. Sit down then put one tab under the tongue every 5 minutes as needed (Patient not taking: Reported on 8/17/2021)     No current facility-administered medications for this visit. Past medical history/surgical history, family history, and social history have been reviewed for today's visit      ROS    A ten system review of constitutional, cardiovascular, respiratory, musculoskeletal, endocrine, skin, SHEENT, genitourinary, psychiatric and neurologic systems was obtained and is unremarkable except as mentioned under HPI          EXAMINATION:     Visit Vitals  /72 (BP 1 Location: Left upper arm, BP Patient Position: Sitting, BP Cuff Size: Adult)   Pulse 76   Resp 17   Wt 264 lb (119.7 kg)   SpO2 97%   BMI 38.99 kg/m²         General appearance: Patient is well-developed and well-nourished large girth and large boned gentleman in no apparent distress and well groomed.     Psych/mental health:  Affect: Appropriate    PHQ  3 most recent PHQ Screens 3/24/2020   Little interest or pleasure in doing things Not at all   Feeling down, depressed, irritable, or hopeless Several days   Total Score PHQ 2 1   Trouble falling or staying asleep, or sleeping too much Several days   Feeling tired or having little energy Several days   Poor appetite, weight loss, or overeating Not at all   Feeling bad about yourself - or that you are a failure or have let yourself or your family down Not at all   Trouble concentrating on things such as school, work, reading, or watching TV Not at all   Moving or speaking so slowly that other people could have noticed; or the opposite being so fidgety that others notice Not at all   Thoughts of being better off dead, or hurting yourself in some way Not at all   PHQ 9 Score 3       HEENT:   Normocephalic  With evidence of trauma: No  Full range of motion head neck: Yes  Tenderness to palpation of the head neck region: No      Cardiovascular:     Extremities warm to touch: Yes  Extremity swelling: No  Discoloration: No  Evidence of PVD: No    Respiratory:   Dyspnea on exertion: No   Abnormal effort on casual observation: No   Use of portable oxygen: No   Evidence of cyanosis: No     Musculoskeletal:   Evidence of significant bone deformities: No   Spinal curvature: No     Integumentary:    Obvious bruising: No   Lacerations or discoloration on casual observation: No       Neurological Examination:   Mental Status:        MMSE  No flowsheet data found. Formal testing was not completed    there was nothing concerning on general observation and discussion. Alert oriented and appropriate to general conversation  Normal processing on general observation  Followed conversation and responded seemingly appropriate throughout the office visit  No word finding difficulties noted on casual observation  Able to follow directions without difficulty     Cranial Nerves:    Pupils equal round reactive to light   Left sclera with induration  EOMs intact gaze is conjugate  No nystagmus is appreciated  Facial motor intact bilaterally  Hearing intact to conversation  Voice with normal projection, no evidence of secretion pooling  Shoulder shrug intact bilaterally  No tongue deviation appreciated     Motor:   Normal bulk  No tremor appreciated on today's exam  No abnormal movements appreciated on today's exam  Moves extremities spontaneously and with purpose  5/5 x 4      Sensation: Intact to light touch    Coordination/Cerebellar:   Grossly intact    Gait: Ambulates independently    Reflexes: Diminished but symmetrical    Fall risk assessment  Fall Risk Assessment, last 12 mths 3/10/2021   Able to walk?  Yes   Fall in past 12 months? 0   Do you feel unsteady?  0   Are you worried about falling 0                 Rigoberto Houser MS, ANP-BC, [facility]

## 2021-08-23 NOTE — PATIENT INSTRUCTIONS
As per our discussion    MRI scan has been ordered when central scheduling calls you let them know that your shoulder with is quite broad and if you need to be switched to an open scanner or something like that  Central scheduling should be calling you to set up the test that have been ordered. If you do not hear from them you can reach out to them at [phone] to get that completed. The carotid duplex studies which checks your circulation will be done here at the office    In the meantime continue to do good work related to your diabetes and all your other health issues    The lightheadedness and dizziness especially with position changes is multifactorial in nature as we discussed    But again we can check these other studies to make sure there is no contributing factors    Depending on those results we may do what is called ANS testing    Office Policies    o Phone calls/patient messages:  Please allow up to 24 hours for someone in the office to contact you about your call or message. Be mindful your provider may be out of the office or your message may require further review. We encourage you to use [employer] for your messages as this is a faster, more efficient way to communicate with our office    o Medication Refills:  Prescription medications require up to 48 business hours to process. We encourage you to use [employer] for your refills. For controlled medications: Please allow up to 72 business hours to process. Certain medications may require you to  a written prescription at our office. NO narcotic/controlled medications will be prescribed after 4pm Monday through Friday or on weekends    o Form/Paperwork Completion:  We ask that you allow 7-14 business days. You may also download your forms to [employer] to have your doctor print off.

## 2021-08-23 NOTE — LETTER
8/23/2021    Patient: Radha Quintero   YOB: 1980   Date of Visit: 8/23/2021     Manish Oliver MD  [address]  [address]  [address] [address]  Via In Basket    Dear Manish Oliver MD,      Thank you for referring Mr. Ric Taylor to [address] [place] [address] for evaluation. My notes for this consultation are attached. If you have questions, please do not hesitate to call me. I look forward to following your patient along with you.       Sincerely,    Bailey Thomas, NP

## 2021-08-23 NOTE — TELEPHONE ENCOUNTER
Patient states he's having mask problems since pressures have been changed on his bilevel.  He can be reached at [phone]

## 2021-08-23 NOTE — PROGRESS NOTES
Educated patient about risk for severe COVID-19 due to risk factors according to CDC guidelines. ACM reviewed discharge instructions, medical action plan and red flag symptoms with the patient who verbalized understanding. Discussed COVID vaccination status: no. Education provided on COVID-19 vaccination as appropriate. Discussed exposure protocols and quarantine with CDC Guidelines. Patient was given an opportunity to verbalize any questions and concerns and agrees to contact ACM or health care provider for questions related to their healthcare. ACM provided patient info on neck exercises to relieve neck pain. Offered PCP follow up / Patient declined.  Sees ortho as needed

## 2021-08-23 NOTE — ASSESSMENT & PLAN NOTE
MRI of the brain has been ordered along with duplex studies    Patient has been encouraged to keep his risk factors under good control to include blood sugars, hypertension and cholesterol and encouraged to follow-up with his appropriate specialist and PCP

## 2021-08-24 LAB — SARS-COV-2, COV2NT: NOT DETECTED

## 2021-08-24 NOTE — TELEPHONE ENCOUNTER
Patient shared that he is doing well at current settings and will follow up with office moving forward if needed.

## 2021-08-25 ENCOUNTER — ANESTHESIA EVENT (OUTPATIENT)
Dept: SURGERY | Age: 41
End: 2021-08-25
Payer: COMMERCIAL

## 2021-08-26 ENCOUNTER — TELEPHONE (OUTPATIENT)
Dept: SLEEP MEDICINE | Age: 41
End: 2021-08-26

## 2021-08-26 ENCOUNTER — ANESTHESIA (OUTPATIENT)
Dept: SURGERY | Age: 41
End: 2021-08-26
Payer: COMMERCIAL

## 2021-08-26 ENCOUNTER — HOSPITAL ENCOUNTER (OUTPATIENT)
Age: 41
Setting detail: OUTPATIENT SURGERY
Discharge: HOME | End: 2021-08-26
Attending: ORTHOPAEDIC SURGERY | Admitting: ORTHOPAEDIC SURGERY
Payer: COMMERCIAL

## 2021-08-26 VITALS
WEIGHT: 260 LBS | OXYGEN SATURATION: 95 % | SYSTOLIC BLOOD PRESSURE: 123 MMHG | RESPIRATION RATE: 12 BRPM | HEIGHT: 69 IN | BODY MASS INDEX: 38.51 KG/M2 | TEMPERATURE: 98.3 F | HEART RATE: 74 BPM | DIASTOLIC BLOOD PRESSURE: 76 MMHG

## 2021-08-26 DIAGNOSIS — G89.18 POSTOPERATIVE PAIN OF EXTREMITY: Primary | ICD-10-CM

## 2021-08-26 DIAGNOSIS — M79.609 POSTOPERATIVE PAIN OF EXTREMITY: Primary | ICD-10-CM

## 2021-08-26 LAB
GLUCOSE BLD STRIP.AUTO-MCNC: 132 MG/DL (ref 65–117)
GLUCOSE BLD STRIP.AUTO-MCNC: 93 MG/DL (ref 65–117)
SERVICE CMNT-IMP: ABNORMAL
SERVICE CMNT-IMP: NORMAL

## 2021-08-26 PROCEDURE — 77030031139 HC SUT VCRL2 J&J -A: Performed by: ORTHOPAEDIC SURGERY

## 2021-08-26 PROCEDURE — 74011250636 HC RX REV CODE- 250/636

## 2021-08-26 PROCEDURE — 76210000040 HC AMBSU PH I REC FIRST 0.5 HR: Performed by: ORTHOPAEDIC SURGERY

## 2021-08-26 PROCEDURE — 77030000032 HC CUF TRNQT ZIMM -B: Performed by: ORTHOPAEDIC SURGERY

## 2021-08-26 PROCEDURE — 74011000250 HC RX REV CODE- 250: Performed by: NURSE ANESTHETIST, CERTIFIED REGISTERED

## 2021-08-26 PROCEDURE — 74011250636 HC RX REV CODE- 250/636: Performed by: ORTHOPAEDIC SURGERY

## 2021-08-26 PROCEDURE — 2709999900 HC NON-CHARGEABLE SUPPLY: Performed by: ORTHOPAEDIC SURGERY

## 2021-08-26 PROCEDURE — 74011250636 HC RX REV CODE- 250/636: Performed by: NURSE ANESTHETIST, CERTIFIED REGISTERED

## 2021-08-26 PROCEDURE — 74011250637 HC RX REV CODE- 250/637: Performed by: ORTHOPAEDIC SURGERY

## 2021-08-26 PROCEDURE — 74011250636 HC RX REV CODE- 250/636: Performed by: ANESTHESIOLOGY

## 2021-08-26 PROCEDURE — 77030021352 HC CBL LD SYS DISP COVD -B: Performed by: ORTHOPAEDIC SURGERY

## 2021-08-26 PROCEDURE — 74011000250 HC RX REV CODE- 250: Performed by: ORTHOPAEDIC SURGERY

## 2021-08-26 PROCEDURE — 76030000001 HC AMB SURG OR TIME 1 TO 1.5: Performed by: ORTHOPAEDIC SURGERY

## 2021-08-26 PROCEDURE — 76060000062 HC AMB SURG ANES 1 TO 1.5 HR: Performed by: ORTHOPAEDIC SURGERY

## 2021-08-26 PROCEDURE — 77030039825 HC MSK NSL PAP SUPERNO2VA VYRM -B: Performed by: ANESTHESIOLOGY

## 2021-08-26 PROCEDURE — 77030002916 HC SUT ETHLN J&J -A: Performed by: ORTHOPAEDIC SURGERY

## 2021-08-26 PROCEDURE — 77030040356 HC CORD BPLR FRCP COVD -A: Performed by: ORTHOPAEDIC SURGERY

## 2021-08-26 PROCEDURE — 76210000050 HC AMBSU PH II REC 0.5 TO 1 HR: Performed by: ORTHOPAEDIC SURGERY

## 2021-08-26 PROCEDURE — 82962 GLUCOSE BLOOD TEST: CPT

## 2021-08-26 RX ORDER — SODIUM CHLORIDE, SODIUM LACTATE, POTASSIUM CHLORIDE, CALCIUM CHLORIDE 600; 310; 30; 20 MG/100ML; MG/100ML; MG/100ML; MG/100ML
25 INJECTION, SOLUTION INTRAVENOUS CONTINUOUS
Status: DISCONTINUED | OUTPATIENT
Start: 2021-08-26 | End: 2021-08-26 | Stop reason: HOSPADM

## 2021-08-26 RX ORDER — DEXAMETHASONE SODIUM PHOSPHATE 4 MG/ML
INJECTION, SOLUTION INTRA-ARTICULAR; INTRALESIONAL; INTRAMUSCULAR; INTRAVENOUS; SOFT TISSUE AS NEEDED
Status: DISCONTINUED | OUTPATIENT
Start: 2021-08-26 | End: 2021-08-26 | Stop reason: HOSPADM

## 2021-08-26 RX ORDER — HYDROMORPHONE HYDROCHLORIDE 1 MG/ML
.2-.5 INJECTION, SOLUTION INTRAMUSCULAR; INTRAVENOUS; SUBCUTANEOUS ONCE
Status: DISCONTINUED | OUTPATIENT
Start: 2021-08-26 | End: 2021-08-26 | Stop reason: HOSPADM

## 2021-08-26 RX ORDER — LIDOCAINE HYDROCHLORIDE 20 MG/ML
INJECTION, SOLUTION EPIDURAL; INFILTRATION; INTRACAUDAL; PERINEURAL AS NEEDED
Status: DISCONTINUED | OUTPATIENT
Start: 2021-08-26 | End: 2021-08-26 | Stop reason: HOSPADM

## 2021-08-26 RX ORDER — ONDANSETRON 2 MG/ML
4 INJECTION INTRAMUSCULAR; INTRAVENOUS AS NEEDED
Status: DISCONTINUED | OUTPATIENT
Start: 2021-08-26 | End: 2021-08-26 | Stop reason: HOSPADM

## 2021-08-26 RX ORDER — VANCOMYCIN/0.9 % SOD CHLORIDE 1.5G/250ML
1500 PLASTIC BAG, INJECTION (ML) INTRAVENOUS ONCE
Status: COMPLETED | OUTPATIENT
Start: 2021-08-26 | End: 2021-08-26

## 2021-08-26 RX ORDER — SODIUM CHLORIDE 0.9 % (FLUSH) 0.9 %
5-40 SYRINGE (ML) INJECTION AS NEEDED
Status: DISCONTINUED | OUTPATIENT
Start: 2021-08-26 | End: 2021-08-26 | Stop reason: HOSPADM

## 2021-08-26 RX ORDER — ONDANSETRON 2 MG/ML
INJECTION INTRAMUSCULAR; INTRAVENOUS AS NEEDED
Status: DISCONTINUED | OUTPATIENT
Start: 2021-08-26 | End: 2021-08-26 | Stop reason: HOSPADM

## 2021-08-26 RX ORDER — FENTANYL CITRATE 50 UG/ML
25 INJECTION, SOLUTION INTRAMUSCULAR; INTRAVENOUS
Status: DISCONTINUED | OUTPATIENT
Start: 2021-08-26 | End: 2021-08-26 | Stop reason: HOSPADM

## 2021-08-26 RX ORDER — SODIUM CHLORIDE 9 MG/ML
INJECTION, SOLUTION INTRAVENOUS
Status: COMPLETED | OUTPATIENT
Start: 2021-08-26 | End: 2021-08-26

## 2021-08-26 RX ORDER — HYDROCODONE BITARTRATE AND ACETAMINOPHEN 5; 325 MG/1; MG/1
1 TABLET ORAL
Qty: 20 TABLET | Refills: 0 | Status: SHIPPED | OUTPATIENT
Start: 2021-08-26 | End: 2021-08-29

## 2021-08-26 RX ORDER — LIDOCAINE HYDROCHLORIDE AND EPINEPHRINE 20; 5 MG/ML; UG/ML
INJECTION, SOLUTION EPIDURAL; INFILTRATION; INTRACAUDAL; PERINEURAL AS NEEDED
Status: DISCONTINUED | OUTPATIENT
Start: 2021-08-26 | End: 2021-08-26 | Stop reason: HOSPADM

## 2021-08-26 RX ORDER — DEXMEDETOMIDINE HYDROCHLORIDE 100 UG/ML
INJECTION, SOLUTION INTRAVENOUS AS NEEDED
Status: DISCONTINUED | OUTPATIENT
Start: 2021-08-26 | End: 2021-08-26 | Stop reason: HOSPADM

## 2021-08-26 RX ORDER — PROPOFOL 10 MG/ML
INJECTION, EMULSION INTRAVENOUS AS NEEDED
Status: DISCONTINUED | OUTPATIENT
Start: 2021-08-26 | End: 2021-08-26 | Stop reason: HOSPADM

## 2021-08-26 RX ORDER — SODIUM CHLORIDE 0.9 % (FLUSH) 0.9 %
5-40 SYRINGE (ML) INJECTION EVERY 8 HOURS
Status: DISCONTINUED | OUTPATIENT
Start: 2021-08-26 | End: 2021-08-26 | Stop reason: HOSPADM

## 2021-08-26 RX ORDER — VANCOMYCIN/0.9 % SOD CHLORIDE 1.5G/250ML
PLASTIC BAG, INJECTION (ML) INTRAVENOUS
Status: COMPLETED
Start: 2021-08-26 | End: 2021-08-26

## 2021-08-26 RX ORDER — PROPOFOL 10 MG/ML
INJECTION, EMULSION INTRAVENOUS
Status: DISCONTINUED | OUTPATIENT
Start: 2021-08-26 | End: 2021-08-26 | Stop reason: HOSPADM

## 2021-08-26 RX ORDER — MIDAZOLAM HYDROCHLORIDE 1 MG/ML
INJECTION, SOLUTION INTRAMUSCULAR; INTRAVENOUS AS NEEDED
Status: DISCONTINUED | OUTPATIENT
Start: 2021-08-26 | End: 2021-08-26 | Stop reason: HOSPADM

## 2021-08-26 RX ORDER — EPHEDRINE SULFATE/0.9% NACL/PF 50 MG/5 ML
SYRINGE (ML) INTRAVENOUS AS NEEDED
Status: DISCONTINUED | OUTPATIENT
Start: 2021-08-26 | End: 2021-08-26 | Stop reason: HOSPADM

## 2021-08-26 RX ORDER — DIPHENHYDRAMINE HYDROCHLORIDE 50 MG/ML
12.5 INJECTION, SOLUTION INTRAMUSCULAR; INTRAVENOUS AS NEEDED
Status: DISCONTINUED | OUTPATIENT
Start: 2021-08-26 | End: 2021-08-26 | Stop reason: HOSPADM

## 2021-08-26 RX ORDER — FENTANYL CITRATE 50 UG/ML
INJECTION, SOLUTION INTRAMUSCULAR; INTRAVENOUS AS NEEDED
Status: DISCONTINUED | OUTPATIENT
Start: 2021-08-26 | End: 2021-08-26 | Stop reason: HOSPADM

## 2021-08-26 RX ORDER — LIDOCAINE HYDROCHLORIDE 10 MG/ML
0.1 INJECTION, SOLUTION EPIDURAL; INFILTRATION; INTRACAUDAL; PERINEURAL AS NEEDED
Status: DISCONTINUED | OUTPATIENT
Start: 2021-08-26 | End: 2021-08-26 | Stop reason: HOSPADM

## 2021-08-26 RX ADMIN — PROPOFOL 75 MCG/KG/MIN: 10 INJECTION, EMULSION INTRAVENOUS at 08:20

## 2021-08-26 RX ADMIN — Medication 3 AMPULE: at 07:16

## 2021-08-26 RX ADMIN — DEXMEDETOMIDINE HYDROCHLORIDE 4 MCG: 100 INJECTION, SOLUTION, CONCENTRATE INTRAVENOUS at 08:20

## 2021-08-26 RX ADMIN — MIDAZOLAM HYDROCHLORIDE 2 MG: 1 INJECTION, SOLUTION INTRAMUSCULAR; INTRAVENOUS at 08:11

## 2021-08-26 RX ADMIN — VANCOMYCIN HYDROCHLORIDE 1500 MG: 10 INJECTION, POWDER, LYOPHILIZED, FOR SOLUTION INTRAVENOUS at 07:20

## 2021-08-26 RX ADMIN — DEXAMETHASONE SODIUM PHOSPHATE 8 MG: 4 INJECTION, SOLUTION INTRAMUSCULAR; INTRAVENOUS at 08:35

## 2021-08-26 RX ADMIN — LIDOCAINE HYDROCHLORIDE 80 MG: 20 INJECTION, SOLUTION EPIDURAL; INFILTRATION; INTRACAUDAL; PERINEURAL at 08:20

## 2021-08-26 RX ADMIN — PROPOFOL 40 MG: 10 INJECTION, EMULSION INTRAVENOUS at 08:20

## 2021-08-26 RX ADMIN — ONDANSETRON HYDROCHLORIDE 4 MG: 2 INJECTION, SOLUTION INTRAMUSCULAR; INTRAVENOUS at 08:40

## 2021-08-26 RX ADMIN — Medication 10 MG: at 08:27

## 2021-08-26 RX ADMIN — FENTANYL CITRATE 25 MCG: 50 INJECTION, SOLUTION INTRAMUSCULAR; INTRAVENOUS at 09:01

## 2021-08-26 RX ADMIN — Medication 10 MG: at 08:54

## 2021-08-26 RX ADMIN — Medication 1500 MG: at 07:20

## 2021-08-26 RX ADMIN — SODIUM CHLORIDE, POTASSIUM CHLORIDE, SODIUM LACTATE AND CALCIUM CHLORIDE 25 ML/HR: 600; 310; 30; 20 INJECTION, SOLUTION INTRAVENOUS at 07:16

## 2021-08-26 RX ADMIN — FENTANYL CITRATE 50 MCG: 50 INJECTION, SOLUTION INTRAMUSCULAR; INTRAVENOUS at 08:20

## 2021-08-26 NOTE — PERIOP NOTES
Received to PACU. arouses to verbal stimuli,   5812 Stated only discomfort was \"thing in my nose\" . Awake, answering questions, O2 sats 100%, nasal trumpet removed, on room air. 0930 Dr. Sherri Sahni to bedside to speak with pt.  0938 BS checked=93  0942 HOB elevated, drinking apple juice and then diet ginger ale without diff. 1010 D/C instructions reviewed with patient at bedside and with mother in conference room and she signed for them. Pt drinking liquids without difficulty. No discomfort. 1038 Discharged to home via/wc,accompanied to car per RN. Skin warm and dry, awake and alert. Respirations even, unlabored. Pt and family members questions and concerns addressed prior to discharge. All belongings with pt.

## 2021-08-26 NOTE — PERIOP NOTES
Permission received to review discharge instructions and discuss private health information with mother, Zac Miles. Patient states that mother will be with them for at least 24 hours following today's procedure. Mistral-Air warming blanket applied at this time. Set to appropriate setting that is comfortable to patient. Will continue to monitor.

## 2021-08-26 NOTE — TELEPHONE ENCOUNTER
Patient's mother came into the office on 08/26/2021 at 8:30am stating her son is at [facility] today having hand surgery and her needs to have his pap pressure decreased right now it is currently around 10 and it's to much air for him she states. Patient can be reached at [phone].

## 2021-08-26 NOTE — TELEPHONE ENCOUNTER
* Device pressure change to Bi - Level  Max 20; Min EPAP 9; PS 4 cmH2O.    * PAP card download in 2 weeks.

## 2021-08-26 NOTE — ANESTHESIA POSTPROCEDURE EVALUATION
Procedure(s):  RIGHT MIDDLE AND RIGHT RING FINGERS TRIGGER RELEASE. MAC    Anesthesia Post Evaluation      Multimodal analgesia: multimodal analgesia used between 6 hours prior to anesthesia start to PACU discharge  Patient location during evaluation: PACU  Patient participation: complete - patient participated  Level of consciousness: awake and alert  Pain score: 0  Airway patency: patent  Anesthetic complications: no  Cardiovascular status: acceptable  Respiratory status: acceptable  Hydration status: acceptable  Comments: Denies surgical site pain. C/o nasal pain from nasal trumpet; has significant WALLY & needed it as well as a Supernova while sedated. Post anesthesia nausea and vomiting:  none  Final Post Anesthesia Temperature Assessment:  Normothermia (36.0-37.5 degrees C)      INITIAL Post-op Vital signs:   Vitals Value Taken Time   /69 08/26/21 0930   Temp 36.7 °C (98 °F) 08/26/21 0920   Pulse 75 08/26/21 0939   Resp 23 08/26/21 0939   SpO2 97 % 08/26/21 0939   Vitals shown include unvalidated device data.

## 2021-08-26 NOTE — BRIEF OP NOTE
Brief Postoperative Note    Patient: Bethany Alfaro  YOB: 1980  MRN: [id number]    Date of Procedure: 8/26/2021     Pre-Op Diagnosis: TRIGGER FINGERS    Post-Op Diagnosis: Same as preoperative diagnosis.       Procedure(s):  RIGHT MIDDLE AND RIGHT RING FINGERS TRIGGER RELEASE    Surgeon(s):  Shelley Wall MD    Surgical Assistant: None    Anesthesia: MAC     Estimated Blood Loss (mL): Minimal    Complications: None    Specimens: * No specimens in log *     Implants: * No implants in log *    Drains: * No LDAs found *    Findings: Tfs as above    Electronically Signed by Carina Springer MD on 8/26/2021 at 9:29 AM

## 2021-08-26 NOTE — ANESTHESIA PREPROCEDURE EVALUATION
Anesthetic History   No history of anesthetic complications            Review of Systems / Medical History  Patient summary reviewed, nursing notes reviewed and pertinent labs reviewed    Pulmonary        Sleep apnea: CPAP  Smoker      Comments: Former Smoker   Neuro/Psych         Headaches and psychiatric history    Comments: Migraines  Anxiety Cardiovascular    Hypertension          Past MI, CAD, cardiac stents (x8, last time 2019. ) and hyperlipidemia    Exercise tolerance: >4 METS  Comments: 08/21 EKG= SR, LVH, inf infarct, NSSTTW    12 /19 ECHO= EF 61-65%, mod cLVH   GI/Hepatic/Renal     GERD: well controlled          Comments: Hx PANCREATITIS Endo/Other    Diabetes: type 2  Hypothyroidism  Obesity     Other Findings   Comments: Fibromyalgia  Bulging discs in neck         Physical Exam    Airway  Mallampati: IV  TM Distance: 4 - 6 cm  Neck ROM: decreased range of motion, short neck   Mouth opening: Normal     Cardiovascular    Rhythm: regular  Rate: normal         Dental  No notable dental hx    Comments: 2 missing, no loose   Pulmonary  Breath sounds clear to auscultation               Abdominal  GI exam deferred       Other Findings            Anesthetic Plan    ASA: 3  Anesthesia type: MAC          Induction: Intravenous  Anesthetic plan and risks discussed with: Patient      preop glucose 132.  Took BB at 545am

## 2021-08-26 NOTE — PERIOP NOTES
Tanner Rodrigues  1980  [id number]    Situation:  Verbal report given from: DANIEL Vasques RN and Yogesh Laguna  Procedure: Procedure(s):  RIGHT MIDDLE AND RIGHT RING FINGERS TRIGGER RELEASE    Background:    Preoperative diagnosis: TRIGGER FINGERS    Postoperative diagnosis: TRIGGER FINGERS    :  Dr. Lydia Douglas    Assistant(s): Circ-1: Jane CHRISTIE  Scrub Tech-1: May Jean    Specimens: * No specimens in log *    Assessment:  Intra-procedure medications         Anesthesia gave intra-procedure sedation and medications, see anesthesia flow sheet     Intravenous fluids: LR@ KVO     Vital signs stable       Recommendation:    Permission to share finding with mother : yes

## 2021-08-26 NOTE — DISCHARGE INSTRUCTIONS
Discharge Instructions for:    Mamadou Teague / [id number] : 1980    Admitted 2021 Discharged: 2021       Postoperative Hand Surgery Instructions      Elevation:  Elevation is one of the most important things to do following your surgery. Not only does it decrease swelling, but it also decreases pain. For hand or wrist surgery, keep the arm in your sling while up and about, with your hand above your elbow. When lying down, keep your arm propped up on a few pillows, so that your fingers are pointing towards the ceiling. Finger Motion:  **It is very important that you begin moving your fingers immediately after surgery, as often as you can, in order to prevent stiffness. Wiggling your fingers is not the same as moving them - moving your fingers involves bending them until they touch the dressing   (if possible). You should use your other hand to gently move the fingers on the operative hand if necessary. Dressings:  Please leave the surgical dressing in place until you are seen in the office for your first post-operative appointment with Dr Raghu Mckeon. The dressing helps to prevent infection and positions the extremity to protect the surgical procedure  that was performed. Bathing and showering are allowed as long as the dressing is kept dry. To keep your dressing dry while bathing, simply place a plastic bag (bread bag, newspaper bag, trash bag or subway sandwich bag) over the dressing and seal it with tape or a rubber band. Medications: You have likely been given a prescription for pain medication. Please follow the instructions closely. It is safe to take up to 600mg of Ibuprofen (Advil or Motrin) along with the pain prescription as long you are not allergic to it, are not on blood thinners, and do not have gastric reflux or an ulcer. However, do not take any additional tylenol/acetaminophen if your prescription contains tylenol.       Note that my policy is to give only one prescription for pain medication at the time of the surgery - if you use it up quickly, then you will have no more pain medication left after only a few days, and I will not give you another prescription. So use your pain medication sparingly, and only when necessary! If you have new or increasing numbness after any numbing medicine wears off (12-24 hours for regional blocks, 3 hours for local), call the office immediately. If you experience nausea, vomiting or itching with the pain medication, please call the office during regular office hours. Refills for pain medication prescriptions ARE NOT given on the weekend or after regular office hours. If antibiotics have been prescribed, please be sure to complete the entire prescription. Post-Operative Appointments:  Dr. Tam Marie likes to see you back in the office about 10-14 days following your surgery to change the post-operative dressing and remove any necessary sutures or staples. If you have not received a follow up appointment card please contact our office at [phone]. *If you have any questions or concerns or experience any sudden changes in your condition, please call our office at [phone]*    TO PREVENT AN INFECTION      1. [address] YOUR HANDS     To prevent infection, good handwashing is the most important thing you or your caregiver can do.  Wash your hands with soap and water or use the hand  we gave you before you touch any wounds. 2. SHOWER     Use the antibacterial soap we gave you when you take a shower.  Shower with this soap until your wounds are healed.  To reach all areas of your body, you may need someone to help you.  Dont forget to clean your belly button with every shower. 3.  USE CLEAN SHEETS     Use freshly cleaned sheets on your bed after surgery.  To keep the surgery site clean, do not allow pets to sleep with you while your wound is still healing.      4. STOP SMOKING     Stop smoking, or at least cut back on smoking     Smoking slows your healing. 5.  CONTROL YOUR BLOOD SUGAR     High blood sugars slow wound healing. If you are diabetic, control your blood sugar levels before and after your surgery. DO NOT TAKE TYLENOL/ACETAMINOPHEN WITH PERCOCET, [facility], [address] TAKE NARCOTIC PAIN MEDICATIONS WITH FOOD     Narcotics tend to be constipating, we suggest taking a stool softener such as Colace or Miralax (follow package instructions). DO NOT DRIVE WHILE TAKING NARCOTIC PAIN MEDICATIONS. DO NOT TAKE SLEEPING MEDICATIONS OR ANTIANXIETY MEDICATIONS WHILE TAKING NARCOTIC PAIN MEDICATIONS,  ESPECIALLY THE NIGHT OF ANESTHESIA! CPAP PATIENTS BE SURE TO WEAR MACHINE WHENEVER NAPPING OR SLEEPING! DISCHARGE SUMMARY from Nurse    The following personal items collected during your admission are returned to you:   Dental Appliance: Dental Appliances: None  Vision: Visual Aid: None  Hearing Aid:    Jewelry: Jewelry: None  Clothing: Clothing: With patient  Other Valuables: Other Valuables: None  Valuables sent to safe:        PATIENT INSTRUCTIONS:    After General Anesthesia or Intravenous Sedation, for 24 hours or while taking prescription Narcotics:        Someone should be with you for the next 24 hours. For your own safety, a responsible adult must drive you home. Limit your activities  Recommended activity: Rest today, up with assistance today. Do not climb stairs or shower unattended for the next 24 hours. Please start with a soft bland diet and advance as tolerated (no nausea) to regular diet. If you have a sore throat you should try the following: fluids, warm salt water gargles, or throat lozenges. If it does not improve after several days please follow up with your primary physician.   Do not drive and operate hazardous machinery  Do not make important personal or business decisions  Do  not drink alcoholic beverages  If you have not urinated within 8 hours after discharge, please contact your surgeon on call. Report the following to your surgeon:  Excessive pain, swelling, redness or odor of or around the surgical area  Temperature over 100.5  Nausea and vomiting lasting longer than 4 hours or if unable to take medications  Any signs of decreased circulation or nerve impairment to extremity: change in color, persistent  numbness, tingling, coldness or increase pain      You will receive a Post Operative Call from one of the Recovery Room Nurses on the day after your surgery to check on you. It is very important for us to know how you are recovering after your surgery. If you have an issue or need to speak with someone, please call your surgeon, do not wait for the post operative call. You may receive an e-mail or letter in the mail from [employer] regarding your experience with us in the Ambulatory Surgery Unit. Your feedback is valuable to us and we appreciate your participation in the survey. If the above instructions are not adequate or you are having problems after your surgery, call the physician at their office number. We wish you a speedy recovery ? What to do at Home:      *  Please give a list of your current medications to your Primary Care Provider. *  Please update this list whenever your medications are discontinued, doses are      changed, or new medications (including over-the-counter products) are added. *  Please carry medication information at all times in case of emergency situations. If you have not received your influenza and/or pneumococcal vaccine, please follow up with your primary care physician. The discharge information has been reviewed with the patient and caregiver. The patient and caregiver verbalized understanding.     

## 2021-08-26 NOTE — OP NOTES
OPERATIVE REPORT       PREOPERATIVE DIAGNOSIS: Right middle and ring finger trigger fingers  POSTOPERATIVE DIAGNOSIS: Right middle and ring finger trigger fingers  OPERATIVE PROCEDURE: Release right middle and ring finger  trigger fingers  SURGEON: Berto Romero MD.   ANESTHESIA: Local MAC anesthesia. ESTIMATED BLOOD LOSS: less than 5 ccl. SPECIMENS REMOVED: None. COMPLICATIONS: None. IMPLANTS: None. DESCRIPTION OF PROCEDURE: The patient was brought into the operating room,   placed in supine position and sedation administered. The Right upper   extremity was prepped and draped in the usual manner. After time-out,   approximately 5 ccs of 2% lidocaine with epinephrine were injected in   line with the proposed incision. The limb was then elevated, exsanguinated   with an Esmarch bandage and tourniquet inflated to 250 mmHg. A transverse incision was made in the distal palm, proximal to the appropriate fingers. Subcutaneous tissues were carefully divided and the A1 pulley located and divided under   direct visualization. The tendons then moved freely   with  flexion and extension of the digit, with no signs of triggering. His fingers remained stiff, likely due to his diabetes. The wound was   thoroughly irrigated. Closure was performed with 4-0 nylon suture. Xeroform, 4 x 4's, Kerlix and Coban were used as dressing. The tourniquet was   released. Total tourniquet time was 37 minutes. The patient tolerated the   procedure well, was taken back to the PACU in stable condition.      Carina Springer MD   8/26/2021

## 2021-08-26 NOTE — TELEPHONE ENCOUNTER
Warner Velasquez was called and shared he would like the setting reduced for comfort. Download was added  To patient chart for review.

## 2021-08-27 NOTE — TELEPHONE ENCOUNTER
Confirm sending these new settings to device [id number]:   Set Therapy mode to VAuto   Set Max IPAP to 20.0 cmH2O   Set Min EPAP to 9.0 cmH2O   Set Pressure Support to 4.0 cmH2O    Set Ramp enable to On   Set Ramp time to 30 min   Set Start EPAP to 4.0 cmH2O     Patient was contacted and made aware of pressure settings change.

## 2021-09-01 ENCOUNTER — TELEPHONE (OUTPATIENT)
Dept: SLEEP MEDICINE | Age: 41
End: 2021-09-01

## 2021-09-01 NOTE — TELEPHONE ENCOUNTER
Pressure changes were just changed on 8/26/21 - patient may need a follow up with the physician to address concern.

## 2021-09-03 ENCOUNTER — HOSPITAL ENCOUNTER (OUTPATIENT)
Dept: MRI IMAGING | Age: 41
Discharge: HOME | End: 2021-09-03
Payer: COMMERCIAL

## 2021-09-03 DIAGNOSIS — R42 DIZZY SPELLS: ICD-10-CM

## 2021-09-03 PROCEDURE — 70551 MRI BRAIN STEM W/O DYE: CPT

## 2021-09-04 ENCOUNTER — HOSPITAL ENCOUNTER (EMERGENCY)
Age: 41
Discharge: HOME | End: 2021-09-05
Attending: EMERGENCY MEDICINE
Payer: COMMERCIAL

## 2021-09-04 VITALS
RESPIRATION RATE: 18 BRPM | TEMPERATURE: 97.5 F | DIASTOLIC BLOOD PRESSURE: 89 MMHG | SYSTOLIC BLOOD PRESSURE: 194 MMHG | BODY MASS INDEX: 38.96 KG/M2 | HEART RATE: 83 BPM | OXYGEN SATURATION: 100 % | HEIGHT: 69 IN | WEIGHT: 263.01 LBS

## 2021-09-04 DIAGNOSIS — N48.1 BALANITIS: Primary | ICD-10-CM

## 2021-09-04 DIAGNOSIS — B00.9 HERPES INFECTION: ICD-10-CM

## 2021-09-04 LAB
GLUCOSE BLD STRIP.AUTO-MCNC: 275 MG/DL (ref 65–117)
SERVICE CMNT-IMP: ABNORMAL

## 2021-09-04 PROCEDURE — 99284 EMERGENCY DEPT VISIT MOD MDM: CPT

## 2021-09-04 PROCEDURE — 82962 GLUCOSE BLOOD TEST: CPT

## 2021-09-05 LAB
APPEARANCE UR: CLEAR
BACTERIA URNS QL MICRO: NEGATIVE /HPF
BILIRUB UR QL: NEGATIVE
COLOR UR: ABNORMAL
EPITH CASTS URNS QL MICRO: ABNORMAL /LPF
GLUCOSE UR STRIP.AUTO-MCNC: >1000 MG/DL
HGB UR QL STRIP: NEGATIVE
KETONES UR QL STRIP.AUTO: 15 MG/DL
LEUKOCYTE ESTERASE UR QL STRIP.AUTO: ABNORMAL
NITRITE UR QL STRIP.AUTO: NEGATIVE
OTHER,OTHU: ABNORMAL
PH UR STRIP: 6.5 [PH] (ref 5–8)
PROT UR STRIP-MCNC: 30 MG/DL
RBC #/AREA URNS HPF: ABNORMAL /HPF (ref 0–5)
SP GR UR REFRACTOMETRY: 1.02 (ref 1–1.03)
UA: UC IF INDICATED,UAUC: ABNORMAL
UROBILINOGEN UR QL STRIP.AUTO: 2 EU/DL (ref 0.2–1)
WBC URNS QL MICRO: ABNORMAL /HPF (ref 0–4)

## 2021-09-05 PROCEDURE — 81001 URINALYSIS AUTO W/SCOPE: CPT

## 2021-09-05 PROCEDURE — 74011000250 HC RX REV CODE- 250: Performed by: EMERGENCY MEDICINE

## 2021-09-05 PROCEDURE — 74011250637 HC RX REV CODE- 250/637: Performed by: EMERGENCY MEDICINE

## 2021-09-05 RX ORDER — FLUCONAZOLE 100 MG/1
150 TABLET ORAL
Status: COMPLETED | OUTPATIENT
Start: 2021-09-05 | End: 2021-09-05

## 2021-09-05 RX ORDER — ACYCLOVIR 800 MG/1
800 TABLET ORAL
Qty: 35 TABLET | Refills: 0 | Status: SHIPPED | OUTPATIENT
Start: 2021-09-05 | End: 2021-09-12

## 2021-09-05 RX ORDER — LIDOCAINE 40 MG/G
CREAM TOPICAL
Status: COMPLETED | OUTPATIENT
Start: 2021-09-05 | End: 2021-09-05

## 2021-09-05 RX ORDER — VALACYCLOVIR HYDROCHLORIDE 500 MG/1
500 TABLET, FILM COATED ORAL
Status: COMPLETED | OUTPATIENT
Start: 2021-09-05 | End: 2021-09-05

## 2021-09-05 RX ORDER — VALACYCLOVIR HYDROCHLORIDE 1 G/1
1000 TABLET, FILM COATED ORAL 3 TIMES DAILY
Qty: 21 TABLET | Refills: 0 | Status: SHIPPED | OUTPATIENT
Start: 2021-09-05 | End: 2021-09-12

## 2021-09-05 RX ORDER — CHLORPHENIRAMINE MALEATE 4 MG
TABLET ORAL 2 TIMES DAILY
Qty: 24 G | Refills: 0 | Status: SHIPPED | OUTPATIENT
Start: 2021-09-05 | End: 2021-10-05

## 2021-09-05 RX ADMIN — VALACYCLOVIR HYDROCHLORIDE 500 MG: 500 TABLET, FILM COATED ORAL at 01:49

## 2021-09-05 RX ADMIN — FLUCONAZOLE 150 MG: 100 TABLET ORAL at 00:35

## 2021-09-05 RX ADMIN — LIDOCAINE: 40 CREAM TOPICAL at 00:36

## 2021-09-05 NOTE — ED PROVIDER NOTES
EMERGENCY DEPARTMENT HISTORY AND PHYSICAL EXAM      Date: 9/4/2021  Patient Name: Araseli Haile    History of Presenting Illness     Chief Complaint   Patient presents with    Rash     penile swelling; reddness; states is able to urinate; denies any urinary infection s/s; recent hand surgery and was on antibiotics for it afterwards; thinks he may have a yeast infection. History Provided By: Patient    HPI: Araseli Haile, 42 y.o. male presents to the ED with cc of pain and swelling of penis. Pt uncircumcised with hx of DM. He states in the past he has had issues like this but typically improved with daily cleaning with Hibiclens and water. He states he had surgery on his right hand. Since then he had noted some swelling to the penis and he began using Hibiclens. Today he had been unable to retract foreskin. He is having pain in the penis rated 8/10. There are no alleviating factors. Pain exacerbated by touch. He denies any penile discharge. He is able to urinate. There has been no fever or chills. He denies n/v/d. He denies any pain in the scrotum, testicles, rectal. He denies hematuria. There are no other complaints, changes, or physical findings at this time. PCP: Malick Hobbs MD    No current facility-administered medications on file prior to encounter. Current Outpatient Medications on File Prior to Encounter   Medication Sig Dispense Refill    insulin glargine (Lantus U-100 Insulin) 100 unit/mL injection INJECT 95 UNITS SUBCUTANEOUSLY IN THE MORNING AND 95 UNITS AT NIGHT 60 mL 1    ibuprofen (MOTRIN) 600 mg tablet Take 1 Tablet by mouth every six (6) hours as needed for Pain. 20 Tablet 0    lisinopriL (PRINIVIL, ZESTRIL) 20 mg tablet Take 10 mg by mouth two (2) times a day.  butalbital-acetaminophen-caff (Fioricet) -40 mg per capsule Take 1 Capsule by mouth two (2) times daily as needed for Headache.       cyclobenzaprine (FLEXERIL) 10 mg tablet Take 10 mg by mouth three (3) times daily as needed for Muscle Spasm(s).  levothyroxine (SYNTHROID) 112 mcg tablet TAKE 1 TABLET BY MOUTH ONCE DAILY BEFORE BREAKFAST 90 Tablet 2    busPIRone (BUSPAR) 15 mg tablet Take 1 tablet by mouth twice daily 60 Tablet 2    diazePAM (Valium) 5 mg tablet Take 1 Tablet by mouth three (3) times daily as needed for Anxiety (spasm). Max Daily Amount: 15 mg. 20 Tablet 0    testosterone cypionate (DEPOTESTOTERONE CYPIONATE) 200 mg/mL injection INJECT 1/2 ML (100 MG) INTRAMUSCULARLY EVERY WEEK 2 mL 4    sertraline (ZOLOFT) 100 mg tablet Take 1 Tab by mouth daily. 30 Tab 5    flash glucose sensor (FreeStyle Mikal 2 Sensor) kit Change sensor every 14 days 2 Kit 3    flash glucose scanning reader (FreeStyle Mikal 2 [place]) misc Change sensor every 14 days 1 Each 0    cholecalciferol (Vitamin D3) (5000 Units/125 mcg) tab tablet Take 1 Tab by mouth daily. 90 Tab 1    Insulin Syringe-Needle U-100 (BD Insulin Syringe) 1 mL 25 x 1\" syrg Use for drawing up/injecting testosterone once weekly. 100 Each 3    Syringe with Needle, Disp, 1 mL 25 gauge x 5/8\" syrg Use with testosterone once every week 50 Syringe 3    rosuvastatin (CRESTOR) 20 mg tablet Take 1 tablet by mouth nightly 30 Tab 0    insulin regular (NOVOLIN R, HUMULIN R) 100 unit/mL injection 20-38 units with breakfast and 20-38 units with dinner plus correction. 160 units per day max (Patient taking differently: 30 units with breakfast,30 units with lunch and  30 units with dinner plus correction. 160 units per day max) 5 Vial 3    metFORMIN (GLUCOPHAGE) 500 mg tablet TAKE 1 TABLET BY MOUTH TWICE DAILY WITH MEALS 180 Tab 3    LORazepam (ATIVAN) 1 mg tablet Take 1 Tab by mouth every four (4) hours as needed for Anxiety. Max Daily Amount: 6 mg. 30 Tab 5    aluminum & magnesium hydroxide-simethicone (Maalox Maximum Strength) 400-400-40 mg/5 mL suspension Take 10 mL by mouth every six (6) hours as needed for Indigestion.       Omeprazole delayed release (PRILOSEC D/R) 20 mg tablet Take 1 Tab by mouth daily. 20 Tab 0    clopidogrel (PLAVIX) 75 mg tab TAKE 1 TABLET BY MOUTH ONCE DAILY      nitroglycerin (NITROSTAT) 0.4 mg SL tablet Take 1 Tab by mouth every five (5) minutes as needed for Chest Pain. Sit down then put one tab under the tongue every 5 minutes as needed (Patient not taking: Reported on 8/17/2021) 1 Bottle 0    metoprolol succinate (TOPROL-XL) 25 mg XL tablet Take 1 Tab by mouth daily. (Patient taking differently: Take 25 mg by mouth two (2) times a day.) 90 Tab 3    aspirin delayed-release 81 mg tablet Take 1 Tab by mouth daily.  27 Tab 0       Past History     Past Medical History:  Past Medical History:   Diagnosis Date    Anxiety and depression     Bleeding of eye, left     8/17/21 pt reports receiving injections in right eye for beeding    Chronic headaches 2007    COVID-19 12/20/2020    Diabetes ([facility] [address]     GERD (gastroesophageal reflux disease)     Hypercholesterolemia     Hypertension     Hypothyroidism     MI (myocardial infarction) ([facility] [address]     as of 8/17/21 pt reports 8 stents total    WALLY on CPAP        Past Surgical History:  Past Surgical History:   Procedure Laterality Date    HX CARPAL TUNNEL RELEASE Left 2012    HX CARPAL TUNNEL RELEASE Right 2018    CTE trigger thumb and index finger    HX HEART CATHETERIZATION      HX LAP CHOLECYSTECTOMY  8/26/14    Dr Dylon August    HX WISDOM TEETH EXTRACTION         Family History:  Family History   Problem Relation Age of Onset    Diabetes Mother     Heart Disease Father     Cancer Father     Diabetes Father     Diabetes Paternal Aunt     Diabetes Maternal Grandmother     Thyroid Cancer Maternal Grandmother     Kidney Disease Maternal Grandmother     Arthritis Maternal Grandmother     Heart Disease Maternal Grandfather     High Cholesterol Maternal Grandfather     Hypertension Maternal Grandfather     Diabetes Paternal Grandmother     Hemophilia Paternal Grandfather Social History:  Social History     Tobacco Use    Smoking status: Former Smoker     Packs/day: 0.00     Years: 14.00     Pack years: 0.00     Quit date: 2014     Years since quittin.6    Smokeless tobacco: Never Used   Substance Use Topics    Alcohol use: No    Drug use: No       Allergies: Allergies   Allergen Reactions    Morphine Nausea and Vomiting    Penicillin G Swelling    Percocet [Oxycodone-Acetaminophen] Hives and Nausea and Vomiting    Sulfa (Sulfonamide Antibiotics) Swelling    Tramadol Nausea Only     Nausea and headache           Review of Systems   Review of Systems   Constitutional: Negative. Negative for appetite change, chills, fatigue and fever. HENT: Negative. Negative for congestion, rhinorrhea, sinus pressure and sore throat. Eyes: Negative. Respiratory: Negative. Negative for cough, choking, chest tightness, shortness of breath and wheezing. Cardiovascular: Negative. Negative for chest pain, palpitations and leg swelling. Gastrointestinal: Negative for abdominal pain, constipation, diarrhea, nausea and vomiting. Endocrine: Negative. Genitourinary: Positive for penile pain and penile swelling. Negative for difficulty urinating, dysuria, flank pain, frequency, hematuria and urgency. Musculoskeletal: Negative. Recent surgery on right hand    Skin: Negative. Neurological: Negative. Negative for dizziness, speech difficulty, weakness, light-headedness, numbness and headaches. Psychiatric/Behavioral: Negative. All other systems reviewed and are negative. Physical Exam   Physical Exam  Vitals and nursing note reviewed. Constitutional:       General: He is not in acute distress. Appearance: He is well-developed. He is obese. He is not diaphoretic. HENT:      Head: Normocephalic and atraumatic. Mouth/Throat:      Pharynx: No oropharyngeal exudate. Eyes:      Extraocular Movements: Extraocular movements intact. Conjunctiva/sclera: Conjunctivae normal.      Pupils: Pupils are equal, round, and reactive to light. Neck:      Vascular: No JVD. Trachea: No tracheal deviation. Cardiovascular:      Rate and Rhythm: Normal rate and regular rhythm. Heart sounds: Normal heart sounds. No murmur heard. Pulmonary:      Effort: Pulmonary effort is normal. No respiratory distress. Breath sounds: Normal breath sounds. No stridor. No wheezing or rales. Genitourinary:     Comments: Uncircumcised, the penis is edematous, foreskin can not be pulled all the way back, but can be retracted enough to view urethral os, there is no discharge, several ulcerations/vessicles, c/w HSV outbreak, no scrotal swelling   Musculoskeletal:         General: No tenderness. Normal range of motion. Cervical back: Normal range of motion and neck supple. Right lower leg: No edema. Left lower leg: No edema. Comments: Surgical dressing in place right hand   Skin:     General: Skin is warm and dry. Capillary Refill: Capillary refill takes less than 2 seconds. Neurological:      Mental Status: He is alert and oriented to person, place, and time. Cranial Nerves: No cranial nerve deficit.       Comments: No gross motor or sensory deficits    Psychiatric:         Mood and Affect: Mood normal.         Behavior: Behavior normal.         Diagnostic Study Results     Labs -     Recent Results (from the past 12 hour(s))   GLUCOSE, POC    Collection Time: 09/04/21 11:37 PM   Result Value Ref Range    Glucose (POC) 275 (H) 65 - 117 mg/dL    Performed by Fifi Lang    URINALYSIS W/ REFLEX CULTURE    Collection Time: 09/05/21 12:40 AM    Specimen: Urine   Result Value Ref Range    Color YELLOW/STRAW      Appearance CLEAR CLEAR      Specific gravity 1.020 1.003 - 1.030      pH (UA) 6.5 5.0 - 8.0      Protein 30 (A) NEG mg/dL    Glucose >1,000 (A) NEG mg/dL    Ketone 15 (A) NEG mg/dL    Bilirubin Negative NEG      Blood Negative NEG      Urobilinogen 2.0 (H) 0.2 - 1.0 EU/dL    Nitrites Negative NEG      Leukocyte Esterase SMALL (A) NEG      WBC 5-10 0 - 4 /hpf    RBC 0-5 0 - 5 /hpf    Epithelial cells FEW FEW /lpf    Bacteria Negative NEG /hpf    UA:UC IF INDICATED CULTURE NOT INDICATED BY UA RESULT CNI      Other: Renal Epithelial cells Present         Radiologic Studies -   No orders to display     CT Results  (Last 48 hours)    None        CXR Results  (Last 48 hours)    None          Medical Decision Making   I am the first provider for this patient. I reviewed the vital signs, available nursing notes, past medical history, past surgical history, family history and social history. Vital Signs-Reviewed the patient's vital signs. Patient Vitals for the past 12 hrs:   Temp Pulse Resp BP SpO2   09/04/21 2342 97.5 °F (36.4 °C) -- -- -- --   09/04/21 2326 -- 83 18 (!) 194/89 100 %     Records Reviewed: Nursing Notes, Old Medical Records, Previous Radiology Studies and Previous Laboratory Studies  Pt with extensive PMHx- DM, CAD, HTN, CVA, most recently had trigger finger release by Dr. Cathy Paget    Provider Notes (Medical Decision Making):   DDx- Balanitis, UTI, HSV outbreak, candidal infxn, phimosis    ED Course:   Initial assessment performed. The patients presenting problems have been discussed, and they are in agreement with the care plan formulated and outlined with them. I have encouraged them to ask questions as they arise throughout their visit. Pt has had fever blisters before. Will start pt on Valtrex given several small ulcerations to the inside of the foreskin that are ttp. Pt is able to urinate and foreskin can be retracted to visualized urethral os. Pt is a diabetic and is also at [facility] for yeast. Pt has been using  Monistat cream which he states has helped some. Pt given dose of Diflucan here will prescribe anti-fungal for topical application. Lidocaine cream brought pain to 0/10.      Disposition:  DC home, follow-up with Urology, return to the ED if swelling worsens or the inability to urinate. DISCHARGE PLAN:  1. Discharge Medication List as of 9/5/2021  1:55 AM      START taking these medications    Details   valACYclovir (VALTREX) 1 gram tablet Take 1 Tablet by mouth three (3) times daily for 7 days. , Normal, Disp-21 Tablet, R-0      acyclovir (ZOVIRAX) 800 mg tablet Take 1 Tablet by mouth five (5) times daily for 7 days. , Print, Disp-35 Tablet, R-0      clotrimazole (LOTRIMIN) 1 % topical cream Apply  to affected area two (2) times a day for 30 days. Apply twice a day for 2 weeks, Normal, Disp-24 g, R-0         CONTINUE these medications which have NOT CHANGED    Details   insulin glargine (Lantus U-100 Insulin) 100 unit/mL injection INJECT 95 UNITS SUBCUTANEOUSLY IN THE MORNING AND 95 UNITS AT NIGHT, Normal, Disp-60 mL, R-1      ibuprofen (MOTRIN) 600 mg tablet Take 1 Tablet by mouth every six (6) hours as needed for Pain., Normal, Disp-20 Tablet, R-0      lisinopriL (PRINIVIL, ZESTRIL) 20 mg tablet Take 10 mg by mouth two (2) times a day., Historical Med      butalbital-acetaminophen-caff (Fioricet) -40 mg per capsule Take 1 Capsule by mouth two (2) times daily as needed for Headache., Historical Med      cyclobenzaprine (FLEXERIL) 10 mg tablet Take 10 mg by mouth three (3) times daily as needed for Muscle Spasm(s). , Historical Med      levothyroxine (SYNTHROID) 112 mcg tablet TAKE 1 TABLET BY MOUTH ONCE DAILY BEFORE BREAKFAST, Normal, Disp-90 Tablet, R-2      busPIRone (BUSPAR) 15 mg tablet Take 1 tablet by mouth twice daily, Normal, Disp-60 Tablet, R-2      diazePAM (Valium) 5 mg tablet Take 1 Tablet by mouth three (3) times daily as needed for Anxiety (spasm).  Max Daily Amount: 15 mg., Normal, Disp-20 Tablet, R-0      testosterone cypionate (DEPOTESTOTERONE CYPIONATE) 200 mg/mL injection INJECT 1/2 ML (100 MG) INTRAMUSCULARLY EVERY WEEK, Normal, Disp-2 mL, R-4      sertraline (ZOLOFT) 100 mg tablet Take 1 Tab by mouth daily. , Normal, Disp-30 Tab, R-5Please consider 90 day supplies to promote better adherence      flash glucose sensor (FreeStyle Mikal 2 Sensor) kit Change sensor every 14 days, Normal, Disp-2 Kit, R-3      flash glucose scanning reader (FreeStyle Mikal 2 [place]) misc Change sensor every 14 days, Normal, Disp-1 Each, R-0      cholecalciferol (Vitamin D3) (5000 Units/125 mcg) tab tablet Take 1 Tab by mouth daily. , Normal, Disp-90 Tab, R-1      Insulin Syringe-Needle U-100 (BD Insulin Syringe) 1 mL 25 x 1\" syrg Use for drawing up/injecting testosterone once weekly. , Normal, Disp-100 Each,R-3      Syringe with Needle, Disp, 1 mL 25 gauge x 5/8\" syrg Use with testosterone once every week, Normal, Disp-50 Syringe,R-3      rosuvastatin (CRESTOR) 20 mg tablet Take 1 tablet by mouth nightly, Normal, Disp-30 Tab,R-0      insulin regular (NOVOLIN R, HUMULIN R) 100 unit/mL injection 20-38 units with breakfast and 20-38 units with dinner plus correction. 160 units per day max, Normal, Disp-5 Vial,R-3      metFORMIN (GLUCOPHAGE) 500 mg tablet TAKE 1 TABLET BY MOUTH TWICE DAILY WITH MEALS, Normal, Disp-180 Tab,R-3      LORazepam (ATIVAN) 1 mg tablet Take 1 Tab by mouth every four (4) hours as needed for Anxiety. Max Daily Amount: 6 mg., Normal, Disp-30 Tab, R-5      aluminum & magnesium hydroxide-simethicone (Maalox Maximum Strength) 400-400-40 mg/5 mL suspension Take 10 mL by mouth every six (6) hours as needed for Indigestion. , Historical Med      Omeprazole delayed release (PRILOSEC D/R) 20 mg tablet Take 1 Tab by mouth daily. , Normal, Disp-20 Tab, R-0      clopidogrel (PLAVIX) 75 mg tab TAKE 1 TABLET BY MOUTH ONCE DAILY, Historical Med      nitroglycerin (NITROSTAT) 0.4 mg SL tablet Take 1 Tab by mouth every five (5) minutes as needed for Chest Pain.  Sit down then put one tab under the tongue every 5 minutes as needed, Normal, Disp-1 Bottle, R-0      metoprolol succinate (TOPROL-XL) 25 mg XL tablet Take 1 Tab by mouth daily. , Print, Disp-90 Tab, R-3      aspirin delayed-release 81 mg tablet Take 1 Tab by mouth daily. , Print, Disp-30 Tab, R-0           2. Follow-up Information     Follow up With Specialties Details Why Contact Info    Gem Bueno MD Internal Medicine   [address] [place] [address]  [phone]      Shawnee Baron MD Urology   [place] [address]  [address] [address] [phone]          3. Return to ED if worse     Diagnosis     Clinical Impression:   1. Balanitis    2. Herpes infection        Attestations:    Mati Espinal, DO    Please note that this dictation was completed with [employer], the computer voice recognition software. Quite often unanticipated grammatical, syntax, homophones, and other interpretive errors are inadvertently transcribed by the computer software. Please disregard these errors. Please excuse any errors that have escaped final proofreading. Thank you.

## 2021-09-07 ENCOUNTER — PATIENT OUTREACH (OUTPATIENT)
Dept: CASE MANAGEMENT | Age: 41
End: 2021-09-07

## 2021-09-07 NOTE — PROGRESS NOTES
Patient resolved from Transition of Care episode on 9/7/21. ACM/CTN was unsuccessful at contacting this patient today. Patient/family was provided the following resources and education related to COVID-19 during the initial call:                         Signs, symptoms and red flags related to COVID-19            CDC exposure and quarantine guidelines            [employer] exposure contact - [phone]            Contact for their local Department of Health                 Patient has not had any additional ED or hospital visits. No further outreach scheduled with this CTN/ACM. Episode of Care resolved. Patient has this CTN/ACM contact information if future needs arise.

## 2021-10-01 DIAGNOSIS — F41.9 ANXIETY: ICD-10-CM

## 2021-10-01 RX ORDER — SERTRALINE HYDROCHLORIDE 100 MG/1
TABLET, FILM COATED ORAL
Qty: 30 TABLET | Refills: 0 | Status: SHIPPED | OUTPATIENT
Start: 2021-10-01 | End: 2021-11-08

## 2021-10-01 RX ORDER — METFORMIN HYDROCHLORIDE 500 MG/1
500 TABLET ORAL 2 TIMES DAILY WITH MEALS
Qty: 180 TABLET | Refills: 3 | Status: SHIPPED | OUTPATIENT
Start: 2021-10-01

## 2021-10-06 ENCOUNTER — VIRTUAL VISIT (OUTPATIENT)
Dept: FAMILY MEDICINE CLINIC | Age: 41
End: 2021-10-06
Payer: COMMERCIAL

## 2021-10-06 DIAGNOSIS — I10 HYPERTENSION, WELL CONTROLLED: ICD-10-CM

## 2021-10-06 DIAGNOSIS — E03.9 ACQUIRED HYPOTHYROIDISM: ICD-10-CM

## 2021-10-06 DIAGNOSIS — Z11.59 NEED FOR HEPATITIS C SCREENING TEST: ICD-10-CM

## 2021-10-06 DIAGNOSIS — E11.65 POORLY CONTROLLED DIABETES MELLITUS (HCC): Primary | ICD-10-CM

## 2021-10-06 DIAGNOSIS — R35.0 FREQUENCY OF URINATION: ICD-10-CM

## 2021-10-06 DIAGNOSIS — E78.00 HYPERCHOLESTEROLEMIA: ICD-10-CM

## 2021-10-06 PROCEDURE — 99214 OFFICE O/P EST MOD 30 MIN: CPT | Performed by: INTERNAL MEDICINE

## 2021-10-06 NOTE — PROGRESS NOTES
Called patient to schedule a 12 day follow up with . patient reported have eye surgery on 10/12/2021 at [facility] with Dr. Jimmie Pantoja.

## 2021-10-06 NOTE — PROGRESS NOTES
Chief Complaint   Patient presents with    Medication Refill     AM glucose readings 186 and 350    Follow-up     HPI:  Alistair Arriaga is a 42 y.o. male who was seen by synchronous (real-time) audio-video technology on 10/6/2021 for Medication Refill (AM glucose readings 186 and 350) and Follow-up    Assessment & Plan:   Diagnoses and all orders for this visit:    1. Poorly controlled diabetes mellitus ([address]  -     HEMOGLOBIN A1C WITH EAG; Future  -     METABOLIC PANEL, COMPREHENSIVE; Future    2. Acquired hypothyroidism  -     TSH 3RD GENERATION; Future    3. Frequency of urination  -     URINALYSIS W/ RFLX MICROSCOPIC; Future    4. Hypertension, well controlled  -     HEMOGLOBIN A1C WITH EAG; Future  -     METABOLIC PANEL, COMPREHENSIVE; Future    5. Hypercholesterolemia  -     LIPID PANEL; Future    6. Need for hepatitis C screening test  -     HEPATITIS C AB; Future      I spent at least 20 minutes on this visit with this established patient. [id number]  Subjective:   Alistair Arriaga is a 42 y.o.  male with h/o poorly controlled diabetes, hypertension, hypercholesterolemia, hypothyroidism, migraine headaches, depression and anxiety is seen for overdue follow-up. Patient has not been seen in office since March/2021. Advised to do blood work and follow up in clinic for review of results. Prior to Admission medications    Medication Sig Start Date End Date Taking? Authorizing Provider   sertraline (ZOLOFT) 100 mg tablet Take 1 tablet by mouth once daily 10/1/21  Yes Daisha Morse MD   metFORMIN (GLUCOPHAGE) 500 mg tablet Take 1 Tablet by mouth two (2) times daily (with meals). 10/1/21  Yes Malu Ryan MD   insulin regular (HumuLIN R Regular U-100 Insuln) 100 unit/mL injection 30 units with breakfast,30 units with lunch and  30 units with dinner plus correction.  160 units per day max 9/29/21  Yes Malu Ryan MD   insulin glargine (Lantus U-100 Insulin) 100 unit/mL injection INJECT 95 UNITS SUBCUTANEOUSLY IN THE MORNING AND 95 UNITS AT NIGHT 9/1/21  Yes Chavez Golden MD   lisinopriL (PRINIVIL, ZESTRIL) 20 mg tablet Take 10 mg by mouth two (2) times a day. Yes Provider, Historical   levothyroxine (SYNTHROID) 112 mcg tablet TAKE 1 TABLET BY MOUTH ONCE DAILY BEFORE BREAKFAST 7/27/21  Yes Chavez Golden MD   busPIRone (BUSPAR) 15 mg tablet Take 1 tablet by mouth twice daily 7/8/21  Yes Brittany Mcwilliams MD   testosterone cypionate (DEPOTESTOTERONE CYPIONATE) 200 mg/mL injection INJECT 1/2 ML (100 MG) INTRAMUSCULARLY EVERY WEEK 6/23/21  Yes Chavez Golden MD   flash glucose sensor (FreeStyle [place] 2 Sensor) kit Change sensor every 14 days 3/26/21  Yes Chavez Golden MD   flash glucose scanning reader (FreeStyle Mikal 2 [place]) misc Change sensor every 14 days 3/26/21  Yes Chavez Golden MD   cholecalciferol (Vitamin D3) (5000 Units/125 mcg) tab tablet Take 1 Tab by mouth daily. 3/10/21  Yes Brittany Mcwilliams MD   Insulin Syringe-Needle U-100 (BD Insulin Syringe) 1 mL 25 x 1\" syrg Use for drawing up/injecting testosterone once weekly. 12/7/20  Yes Chavez Golden MD   Syringe with Needle, Disp, 1 mL 25 gauge x 5/8\" syrg Use with testosterone once every week 12/4/20  Yes Chavez Golden MD   rosuvastatin (CRESTOR) 20 mg tablet Take 1 tablet by mouth nightly 10/2/20  Yes Brittany Mcwilliams MD   LORazepam (ATIVAN) 1 mg tablet Take 1 Tab by mouth every four (4) hours as needed for Anxiety. Max Daily Amount: 6 mg. 5/8/20  Yes Kamron Gill MD   aluminum & magnesium hydroxide-simethicone (Maalox Maximum Strength) 400-400-40 mg/5 mL suspension Take 10 mL by mouth every six (6) hours as needed for Indigestion. Yes Provider, Historical   Omeprazole delayed release (PRILOSEC D/R) 20 mg tablet Take 1 Tab by mouth daily.  2/14/20  Yes Gina Gordon,    clopidogrel (PLAVIX) 75 mg tab TAKE 1 TABLET BY MOUTH ONCE DAILY 12/23/19  Yes Provider, Historical   nitroglycerin (NITROSTAT) 0.4 mg SL tablet Take 1 Tab by mouth every five (5) minutes as needed for Chest Pain. Sit down then put one tab under the tongue every 5 minutes as needed 12/4/19  Yes Shaquille Louis MD   metoprolol succinate (TOPROL-XL) 25 mg XL tablet Take 1 Tab by mouth daily. Patient taking differently: Take 25 mg by mouth two (2) times a day. 12/3/19  Yes Miguel Esposito MD   aspirin delayed-release 81 mg tablet Take 1 Tab by mouth daily. 9/24/19  Yes Regine Verduzco MD   clotrimazole (LOTRIMIN) 1 % topical cream Apply  to affected area two (2) times a day for 30 days. Apply twice a day for 2 weeks 9/5/21 10/5/21  Shey Florez DO   ibuprofen (MOTRIN) 600 mg tablet Take 1 Tablet by mouth every six (6) hours as needed for Pain. Patient not taking: Reported on 10/6/2021 8/19/21 10/6/21  Shaquille Louis MD   butalbital-acetaminophen-caff (Fioricet) -40 mg per capsule Take 1 Capsule by mouth two (2) times daily as needed for Headache. Patient not taking: Reported on 10/6/2021  10/6/21  Provider, Historical   cyclobenzaprine (FLEXERIL) 10 mg tablet Take 10 mg by mouth three (3) times daily as needed for Muscle Spasm(s). Patient not taking: Reported on 10/6/2021  10/6/21  Provider, Historical   diazePAM (Valium) 5 mg tablet Take 1 Tablet by mouth three (3) times daily as needed for Anxiety (spasm). Max Daily Amount: 15 mg.   Patient not taking: Reported on 10/6/2021 6/27/21 10/6/21  Shey Florez DO     Patient Active Problem List   Diagnosis Code    DM (diabetes mellitus) ([facility] E11.9    Acquired hypothyroidism E03.9    Essential hypertension I10    Fibromyalgia M79.7    Microalbuminuria R80.9    Anxiety F41.9    Migraine without aura G43.009    Hypertriglyceridemia E78.1    Severe obesity (HCC) E66.01    Bilateral carotid artery stenosis I65.23    Transient ischemic attack involving left internal carotid artery G45.1    Chest pain R07.9    Unstable angina ([facility]) I20.0    Coronary artery disease involving native coronary artery of native heart with unstable angina pectoris ([facility]) I25.110    Type 2 diabetes with nephropathy ([facility]) E11.21    Dysthymia F34.1    Adjustment disorder with mixed emotional features F43.29    Anxiety disorder due to general medical condition with panic attack F06.4, F41.0    Insomnia disorder with non-sleep disorder mental comorbidity G47.00    Dizzy spells R42    Multiple lacunar infarcts ([facility]) I63.81     Current Outpatient Medications   Medication Sig Dispense Refill    sertraline (ZOLOFT) 100 mg tablet Take 1 tablet by mouth once daily 30 Tablet 0    metFORMIN (GLUCOPHAGE) 500 mg tablet Take 1 Tablet by mouth two (2) times daily (with meals). 180 Tablet 3    insulin regular (HumuLIN R Regular U-100 Insuln) 100 unit/mL injection 30 units with breakfast,30 units with lunch and  30 units with dinner plus correction. 160 units per day max 50 mL 3    insulin glargine (Lantus U-100 Insulin) 100 unit/mL injection INJECT 95 UNITS SUBCUTANEOUSLY IN THE MORNING AND 95 UNITS AT NIGHT 60 mL 1    lisinopriL (PRINIVIL, ZESTRIL) 20 mg tablet Take 10 mg by mouth two (2) times a day.  levothyroxine (SYNTHROID) 112 mcg tablet TAKE 1 TABLET BY MOUTH ONCE DAILY BEFORE BREAKFAST 90 Tablet 2    busPIRone (BUSPAR) 15 mg tablet Take 1 tablet by mouth twice daily 60 Tablet 2    testosterone cypionate (DEPOTESTOTERONE CYPIONATE) 200 mg/mL injection INJECT 1/2 ML (100 MG) INTRAMUSCULARLY EVERY WEEK 2 mL 4    flash glucose sensor (FreeStyle Mikal 2 Sensor) kit Change sensor every 14 days 2 Kit 3    flash glucose scanning reader (FreeStyle Mikal 2 [place]) misc Change sensor every 14 days 1 Each 0    cholecalciferol (Vitamin D3) (5000 Units/125 mcg) tab tablet Take 1 Tab by mouth daily. 90 Tab 1    Insulin Syringe-Needle U-100 (BD Insulin Syringe) 1 mL 25 x 1\" syrg Use for drawing up/injecting testosterone once weekly.  100 Each 3    Syringe with Needle, Disp, 1 mL 25 gauge x 5/8\" syrg Use with testosterone once every week 50 Syringe 3    rosuvastatin (CRESTOR) 20 mg tablet Take 1 tablet by mouth nightly 30 Tab 0    LORazepam (ATIVAN) 1 mg tablet Take 1 Tab by mouth every four (4) hours as needed for Anxiety. Max Daily Amount: 6 mg. 30 Tab 5    aluminum & magnesium hydroxide-simethicone (Maalox Maximum Strength) 400-400-40 mg/5 mL suspension Take 10 mL by mouth every six (6) hours as needed for Indigestion.  Omeprazole delayed release (PRILOSEC D/R) 20 mg tablet Take 1 Tab by mouth daily. 20 Tab 0    clopidogrel (PLAVIX) 75 mg tab TAKE 1 TABLET BY MOUTH ONCE DAILY      nitroglycerin (NITROSTAT) 0.4 mg SL tablet Take 1 Tab by mouth every five (5) minutes as needed for Chest Pain. Sit down then put one tab under the tongue every 5 minutes as needed 1 Bottle 0    metoprolol succinate (TOPROL-XL) 25 mg XL tablet Take 1 Tab by mouth daily. (Patient taking differently: Take 25 mg by mouth two (2) times a day.) 90 Tab 3    aspirin delayed-release 81 mg tablet Take 1 Tab by mouth daily.  30 Tab 0     Allergies   Allergen Reactions    Morphine Nausea and Vomiting    Penicillin G Swelling    Percocet [Oxycodone-Acetaminophen] Hives and Nausea and Vomiting    Sulfa (Sulfonamide Antibiotics) Swelling    Tramadol Nausea Only     Nausea and headache       Past Medical History:   Diagnosis Date    Anxiety and depression     Bleeding of eye, left     8/17/21 pt reports receiving injections in right eye for beeding    Chronic headaches 2007    COVID-19 12/20/2020    Diabetes ([facility]     GERD (gastroesophageal reflux disease)     Hypercholesterolemia     Hypertension     Hypothyroidism     MI (myocardial infarction) ([facility]     as of 8/17/21 pt reports 8 stents total    WALLY on CPAP      Past Surgical History:   Procedure Laterality Date    HX CARPAL TUNNEL RELEASE Left 2012    HX CARPAL TUNNEL RELEASE Right 2018    CTE trigger thumb and index finger    HX HEART CATHETERIZATION      HX LAP CHOLECYSTECTOMY  14    Dr Nasir Patel EXTRACTION       Family History   Problem Relation Age of Onset    Diabetes Mother     Heart Disease Father     Cancer Father     Diabetes Father     Diabetes Paternal Aunt     Diabetes Maternal Grandmother     Thyroid Cancer Maternal Grandmother     Kidney Disease Maternal Grandmother     Arthritis Maternal Grandmother     Heart Disease Maternal Grandfather     High Cholesterol Maternal Grandfather     Hypertension Maternal Grandfather     Diabetes Paternal Grandmother     Hemophilia Paternal Grandfather      Social History     Tobacco Use    Smoking status: Former Smoker     Packs/day: 0.00     Years: 14.00     Pack years: 0.00     Quit date: 2014     Years since quittin.7    Smokeless tobacco: Never Used   Substance Use Topics    Alcohol use: No     ROS:  As per hpi    Objective:     Patient-Reported Vitals 10/6/2021   Patient-Reported Weight -   Patient-Reported Temperature 98.7      General: alert, cooperative, no distress   Mental  status: normal mood, behavior, speech, dress, motor activity, and thought processes, able to follow commands   HENT: NCAT   Neck: no visualized mass   Resp: no respiratory distress   Neuro: no gross deficits   Skin: no discoloration or lesions of concern on visible areas   Psychiatric: normal affect, consistent with stated mood, no evidence of hallucinations     Additional exam findings: We discussed the expected course, resolution and complications of the diagnosis(es) in detail. Medication risks, benefits, costs, interactions, and alternatives were discussed as indicated. I advised him to contact the office if his condition worsens, changes or fails to improve as anticipated. He expressed understanding with the diagnosis(es) and plan. Heather Poe, was evaluated through a synchronous (real-time) audio-video encounter.  The patient (or guardian if applicable) is aware that this is a billable service. Verbal consent to proceed has been obtained within the past 12 months. The visit was conducted pursuant to the emergency declaration under the [address] [place] [address] and the [employer] Act. Patient identification was verified, and a caregiver was present when appropriate. The patient was located in a state where the provider was credentialed to provide care.     Wilbert Worrell MD

## 2021-10-08 ENCOUNTER — TELEPHONE (OUTPATIENT)
Dept: ENDOCRINOLOGY | Age: 41
End: 2021-10-08

## 2021-10-08 NOTE — TELEPHONE ENCOUNTER
PATIENT STATES OK TO WAIT UNTIL DR GRACE RETURNS ON 10/11/2021. Spoke with patient. He will be having eye surgery on 10/12/21. He had brought a pre-op packet from his eye surgeon from [facility] listing which meds he can and cannot take the AM of his surgery. He wanted to \"run this by \" to assure he agrees with the recommendation. He said last time he was NPO and took no meds, but this time he was told to take his oral meds (except oral DM meds) with a few sips of water only, but hold plavix and baby aspirin. The form said to take only 45 units of Lantus (1/2 his normal dose) the night before and 45 units (1/2 his normal dose) the AM of his eye surgery. (packet was placed on 's desk). Please respond Monday 10/11/21, as patient is having eye surgery on 10/12/21.

## 2021-10-11 NOTE — TELEPHONE ENCOUNTER
Patient has been advised and expressed understanding to take 60 units tonight and 60 units AM of surgery.

## 2021-10-18 ENCOUNTER — OFFICE VISIT (OUTPATIENT)
Dept: ENDOCRINOLOGY | Age: 41
End: 2021-10-18
Payer: COMMERCIAL

## 2021-10-18 ENCOUNTER — OFFICE VISIT (OUTPATIENT)
Dept: FAMILY MEDICINE CLINIC | Age: 41
End: 2021-10-18

## 2021-10-18 VITALS
HEART RATE: 74 BPM | RESPIRATION RATE: 20 BRPM | SYSTOLIC BLOOD PRESSURE: 127 MMHG | BODY MASS INDEX: 39.25 KG/M2 | WEIGHT: 265 LBS | TEMPERATURE: 97.5 F | DIASTOLIC BLOOD PRESSURE: 70 MMHG | OXYGEN SATURATION: 99 % | HEIGHT: 69 IN

## 2021-10-18 VITALS
WEIGHT: 265.2 LBS | DIASTOLIC BLOOD PRESSURE: 70 MMHG | HEART RATE: 81 BPM | BODY MASS INDEX: 39.28 KG/M2 | HEIGHT: 69 IN | SYSTOLIC BLOOD PRESSURE: 127 MMHG

## 2021-10-18 DIAGNOSIS — E11.65 POORLY CONTROLLED DIABETES MELLITUS (HCC): Primary | ICD-10-CM

## 2021-10-18 DIAGNOSIS — E10.42 TYPE 1 DIABETES MELLITUS WITH DIABETIC POLYNEUROPATHY (HCC): Primary | ICD-10-CM

## 2021-10-18 DIAGNOSIS — I10 HYPERTENSION GOAL BP (BLOOD PRESSURE) < 130/80: ICD-10-CM

## 2021-10-18 DIAGNOSIS — E78.2 MIXED HYPERLIPIDEMIA: ICD-10-CM

## 2021-10-18 DIAGNOSIS — E78.1 HYPERTRIGLYCERIDEMIA: ICD-10-CM

## 2021-10-18 DIAGNOSIS — E29.1 HYPOGONADISM IN MALE: ICD-10-CM

## 2021-10-18 DIAGNOSIS — E03.9 ACQUIRED HYPOTHYROIDISM: ICD-10-CM

## 2021-10-18 PROCEDURE — 99214 OFFICE O/P EST MOD 30 MIN: CPT | Performed by: INTERNAL MEDICINE

## 2021-10-18 RX ORDER — PREDNISOLONE ACETATE 10 MG/ML
SUSPENSION/ DROPS OPHTHALMIC
COMMUNITY
Start: 2021-10-12 | End: 2021-12-03

## 2021-10-18 RX ORDER — OFLOXACIN 3 MG/ML
SOLUTION/ DROPS OPHTHALMIC
COMMUNITY
Start: 2021-10-12 | End: 2022-01-14 | Stop reason: ALTCHOICE

## 2021-10-18 NOTE — PATIENT INSTRUCTIONS
1) Continue the Lantus 90 units in the morning and 90 units at bedtime    2) Increase the Novolin R to 30 units with each meal plus correction.   Taking the 40 units of Novolin R, if your blood sugars are in the 300 is not too much and should not cause your blood sugars to run too low,    3) Novolin R correction  150-199 2 additional units  200-249 4 additional units  250-299 6 additional units  300-349 8 additional units  350-399 10 additional units  400-449 12 additional units  450-499 14 additional units  500-549 16 additional units  550+ 18 additional units

## 2021-10-18 NOTE — LETTER
10/18/2021    Mr. Alistair Arriaga  [facility] [id number]    Dear Marcin Cervantes:    Please find your most recent results below. A1C shows poorly controlled diabetes, Triglyceride level shows improvement, Serum glucose is up  Glucose present in urine    Resulted Orders   HEPATITIS C AB   Result Value Ref Range    Hep C virus Ab Interp. NONREACTIVE NONREACTIVE     HEMOGLOBIN A1C WITH EAG   Result Value Ref Range    Hemoglobin A1c 9.9 (H) 4.0 - 5.6 %    Est. average glucose 237 mg/dL   LIPID PANEL   Result Value Ref Range    Cholesterol, total 142 <200 MG/DL    Triglyceride 223 (H) <150 MG/DL    HDL Cholesterol 35 MG/DL    LDL, calculated 62.4 0 - 100 MG/DL    VLDL, calculated 44.6 MG/DL    CHOL/HDL Ratio 4.1 0.0 - 5.0     METABOLIC PANEL, COMPREHENSIVE   Result Value Ref Range    Sodium 132 (L) 136 - 145 mmol/L    Potassium 4.5 3.5 - 5.1 mmol/L    Chloride 101 97 - 108 mmol/L    CO2 28 21 - 32 mmol/L    Anion gap 3 (L) 5 - 15 mmol/L    Glucose 334 (H) 65 - 100 mg/dL    BUN 18 6 - 20 MG/DL    Creatinine 1.15 0.70 - 1.30 MG/DL    BUN/Creatinine ratio 16 12 - 20      GFR est AA >60 >60 ml/min/1.73m2    GFR est non-AA >60 >60 ml/min/1.73m2    Calcium 9.2 8.5 - 10.1 MG/DL    Bilirubin, total 0.6 0.2 - 1.0 MG/DL    ALT (SGPT) 44 12 - 78 U/L    AST (SGOT) 17 15 - 37 U/L    Alk.  phosphatase 93 45 - 117 U/L    Protein, total 7.9 6.4 - 8.2 g/dL    Albumin 3.7 3.5 - 5.0 g/dL    Globulin 4.2 (H) 2.0 - 4.0 g/dL    A-G Ratio 0.9 (L) 1.1 - 2.2     URINALYSIS W/ RFLX MICROSCOPIC   Result Value Ref Range    Color YELLOW/STRAW      Appearance CLEAR CLEAR      Specific gravity 1.023 1.003 - 1.030      pH (UA) 5.5 5.0 - 8.0      Protein TRACE (A) Negative mg/dL    Glucose >1,000 (A) Negative mg/dL    Ketone Negative Negative mg/dL    Bilirubin Negative Negative      Blood Negative Negative      Urobilinogen 0.2 0.2 - 1.0 EU/dL    Nitrites Negative Negative      Leukocyte Esterase Negative Negative      WBC 0-4 0 - 4 /hpf    RBC 0-5 0 - 5 /hpf    Epithelial cells FEW FEW /lpf    Bacteria Negative Negative /hpf    Hyaline cast 0-2 0 - 5 /lpf   TSH 3RD GENERATION   Result Value Ref Range    TSH 1.71 0.36 - 3.74 uIU/mL     RECOMMENDATIONS:  Work on diet and exercise. Continue with current  medications.     Please call me if you have any questions: [phone]    Sincerely,      Bree Cifuentes MD

## 2021-10-18 NOTE — PROGRESS NOTES
Chief Complaint   Patient presents with    Diabetes     pcp and pharmacy verified    Thyroid Problem    Hypogonadism     History of Present Illness: Vince Brizuela is a 42 y.o. male here for follow up of diabetes. 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 Haley Oliveira of Cardiology. He was also in the ED in January 2020 with issues of N/V. He was tested for influenza and it was negative. For his issues of vertigo, and dizziness he is followed by Dr. Belvie Najjar of Neurology. At our visit in April 2020 I increased his insulin regimen, he was no longer having issues of hypoglycemia, but he was having frequent hyperglycemia. Pt instructed to continue the NPH 70 units in the AM and 70 units HS and to increase his Novolin R to 30 units with breakfast, 30 units with lunch and 30 units with dinner, plus 2:50 correction scale. Pt is still following with Dr. Alaina Gerardo of psychiatry for anxiety and depression. He was working with a dietician and has been using a [employer] CGM. He was on oral prednisone prior to trigger finger release. He also had an injection of steroid in his had. These caused his BG to run much higher than they were. Pt recently had surgery for his eyes (retinopathy). \"I am not seeing very well still. I had blood all in my eye that they got out. I have a bubble holding my retina in place. It is healing well. \"    He is taking Novolin R 20 units per each meal plus 2:50 correction with meals and Lantus 90 units in the AM and 90 units HS and Metformin 1000mg BID. He has been testing his BGs 6-8 times per day using the FreeStyle Mikal CGM. He finds that he is not needing correction with each meal, pt he notes that he may need up to 16 units of correction with a meal.  His BGs are running in the 200-300s range. His A1C last week has declined from 11.2% in April to 9.9% in October 2021.     He notes that he will miss a dose of his Novolin R and will take it AFTER he eats \"when I remember\". He notes when he does recall he will take his Novolin R. Pt is eating 2-3 meals per day  He has breakfast, around 7-10AM, today he did not eat breakfast.   If he has a late breakfast he will skip lunch. He will have lunch 2-3 days per week. He will eat around Noon-1PM, today he had a chicken sandwich, mashed potatoes and diet soda. He notes that he will occasionally snack during the day on crackers and PB or peanuts or chips. He has dinner around 7-9PM, last night he had salad, pasta and diet soda. He will occasionally have an evening snack. He notes he has been snacking on sushi. Typically between MN-2AM.    Pt has hx of CAD s/p stenting in December 2019. Pt is taking Rosuvastatin 20mg daily. Pt has hx of neuropathy. No hx of nephropathy. Pt has hx of hypothyroidism, since the age of 11-9 years old. He is currently taking 112mcg daily. At our visit in October 2019 we tested his Thyroid labs, which were normal, but he had a very low Testosterone of 199 on 10/22/19 and repeat was 240 in 10/28/19. His FSH, LH and prolactin were unremarkable. Pt opted to  Not start Clomid 50mg every other day. At our last visit we again ordered the clomid. Pt notes that he has been taking the Clomid 50mg every other day. He notes he is still having issues of ED and poor libido so he stopped taking the Clomid because of the cost.  He wanted to discuss trying an alternative agent. He is not currently taking the testosterone he noted some change in the size of his testicles and he stopped. He has not taken any in 3 weeks. \"When I don't take it, I can feel the difference. Pt notes he was changed from CPAP to BiPap for the last 2 months.     Current Outpatient Medications   Medication Sig    prednisoLONE acetate (PRED FORTE) 1 % ophthalmic suspension     ofloxacin (FLOXIN) 0.3 % ophthalmic solution     busPIRone (BUSPAR) 15 mg tablet Take 1 tablet by mouth twice daily    sertraline (ZOLOFT) 100 mg tablet Take 1 tablet by mouth once daily    metFORMIN (GLUCOPHAGE) 500 mg tablet Take 1 Tablet by mouth two (2) times daily (with meals).  insulin regular (HumuLIN R Regular U-100 Insuln) 100 unit/mL injection 30 units with breakfast,30 units with lunch and  30 units with dinner plus correction. 160 units per day max (Patient taking differently: 20 units with breakfast, 20 units with lunch and  20 units with dinner plus correction. 160 units per day max)    insulin glargine (Lantus U-100 Insulin) 100 unit/mL injection INJECT 95 UNITS SUBCUTANEOUSLY IN THE MORNING AND 95 UNITS AT NIGHT (Patient taking differently: INJECT 90 UNITS SUBCUTANEOUSLY IN THE MORNING AND 90 UNITS AT NIGHT)    lisinopriL (PRINIVIL, ZESTRIL) 20 mg tablet Take 10 mg by mouth two (2) times a day.  levothyroxine (SYNTHROID) 112 mcg tablet TAKE 1 TABLET BY MOUTH ONCE DAILY BEFORE BREAKFAST    flash glucose sensor (FreeStyle Mikal 2 Sensor) kit Change sensor every 14 days    flash glucose scanning reader (FreeStyle Mikal 2 [place]) misc Change sensor every 14 days    cholecalciferol (Vitamin D3) (5000 Units/125 mcg) tab tablet Take 1 Tab by mouth daily.  Insulin Syringe-Needle U-100 (BD Insulin Syringe) 1 mL 25 x 1\" syrg Use for drawing up/injecting testosterone once weekly.  Syringe with Needle, Disp, 1 mL 25 gauge x 5/8\" syrg Use with testosterone once every week    rosuvastatin (CRESTOR) 20 mg tablet Take 1 tablet by mouth nightly    LORazepam (ATIVAN) 1 mg tablet Take 1 Tab by mouth every four (4) hours as needed for Anxiety. Max Daily Amount: 6 mg.  aluminum & magnesium hydroxide-simethicone (Maalox Maximum Strength) 400-400-40 mg/5 mL suspension Take 10 mL by mouth every six (6) hours as needed for Indigestion.  Omeprazole delayed release (PRILOSEC D/R) 20 mg tablet Take 1 Tab by mouth daily.     clopidogrel (PLAVIX) 75 mg tab TAKE 1 TABLET BY MOUTH ONCE DAILY    nitroglycerin (NITROSTAT) 0.4 mg SL tablet Take 1 Tab by mouth every five (5) minutes as needed for Chest Pain. Sit down then put one tab under the tongue every 5 minutes as needed    metoprolol succinate (TOPROL-XL) 25 mg XL tablet Take 1 Tab by mouth daily. (Patient taking differently: Take 25 mg by mouth two (2) times a day.)    aspirin delayed-release 81 mg tablet Take 1 Tab by mouth daily.  testosterone cypionate (DEPOTESTOTERONE CYPIONATE) 200 mg/mL injection INJECT 1/2 ML (100 MG) INTRAMUSCULARLY EVERY WEEK (Patient not taking: Reported on 10/18/2021)     No current facility-administered medications for this visit. Allergies   Allergen Reactions    Morphine Nausea and Vomiting    Penicillin G Swelling    Percocet [Oxycodone-Acetaminophen] Hives and Nausea and Vomiting    Sulfa (Sulfonamide Antibiotics) Swelling    Tramadol Nausea Only     Nausea and headache       Review of Systems:  - Eyes: no blurry vision or double vision  - Cardiovascular: no chest pain  - Respiratory: no shortness of breath  - Musculoskeletal: no myalgias  - Neurological: no numbness/tingling in extremities    Physical Examination:  Blood pressure 127/70, pulse 81, height 5' 9\" (1.753 m), weight 265 lb 3.2 oz (120.3 kg).   - General: pleasant, no distress, good eye contact   - Neck: no carotid bruits  - Cardiovascular: regular, normal rate, nl s1 and s2, no m/r/g, 2+ DP pulses   - Respiratory: clear bilaterally  - Integumentary: no edema, no foot ulcers,   - Psychiatric: normal mood and affect    Diabetic foot exam:     Left Foot:   Visual Exam: callous - present   Pulse DP: 2+ (normal)   Filament test: normal sensation    Vibratory sensation: normal      Right Foot:   Visual Exam: callous - present   Pulse DP: 2+ (normal)   Filament test: normal sensation    Vibratory sensation: normal        Data Reviewed:   Component      Latest Ref Rng & Units 10/7/2021 10/7/2021 10/7/2021 10/7/2021           1:50 PM  1:50 PM  1:50 PM  1:50 PM   Sodium      136 - 145 mmol/L  132 (L)     Potassium      3.5 - 5.1 mmol/L  4.5     Chloride      97 - 108 mmol/L  101     CO2      21 - 32 mmol/L  28     Anion gap      5 - 15 mmol/L  3 (L)     Glucose      65 - 100 mg/dL  334 (H)     BUN      6 - 20 MG/DL  18     Creatinine      0.70 - 1.30 MG/DL  1.15     BUN/Creatinine ratio      12 - 20    16     GFR est AA      >60 ml/min/1.73m2  >60     GFR est non-AA      >60 ml/min/1.73m2  >60     Calcium      8.5 - 10.1 MG/DL  9.2     Bilirubin, total      0.2 - 1.0 MG/DL  0.6     ALT      12 - 78 U/L  44     AST      15 - 37 U/L  17     Alk. phosphatase      45 - 117 U/L  93     Protein, total      6.4 - 8.2 g/dL  7.9     Albumin      3.5 - 5.0 g/dL  3.7     Globulin      2.0 - 4.0 g/dL  4.2 (H)     A-G Ratio      1.1 - 2.2    0.9 (L)     Cholesterol, total      <200 MG/DL   142    Triglyceride      <150 MG/DL   223 (H)    HDL Cholesterol      MG/DL   35    LDL, calculated      0 - 100 MG/DL   62.4    VLDL, calculated      MG/DL   44.6    CHOL/HDL Ratio      0.0 - 5.0     4.1    Hemoglobin A1c, (calculated)      4.0 - 5.6 %    9.9 (H)   Est. average glucose      mg/dL    237   TSH      0.36 - 3.74 uIU/mL 1.71          Assessment/Plan:   1) DM > His A1C in October 2021 was 9.9%. Pt to continue the Lantus 90 units BID and will increase his Novolin R to 30 units with each meal, plus the 2:50 correction for BG > 150. Pt to check his BGs 4 times per day and mail his BG logs to me in 2 weeks. 2) Hypothyroidism > Pt is clinically and biochemically euthyroid on LT4 112mcg daily. 3) Hypogonadism > Pt to restart the Testosterone 100mg weekly. Pt voices understanding and agreement with the plan. Pain noted and pt was recommended to call his PCP for further evaluation and treatment, as needed    RTC 4 months    Follow-up and Dispositions    · Return in about 4 months (around 2/18/2022).      Copy sent to:  Dr. La Parkinson Martha Plasencia

## 2021-10-18 NOTE — PATIENT INSTRUCTIONS
DASH Diet: Care Instructions  Your Care Instructions     The DASH diet is an eating plan that can help lower your blood pressure. DASH stands for Dietary Approaches to Stop Hypertension. Hypertension is high blood pressure. The DASH diet focuses on eating foods that are high in calcium, potassium, and magnesium. These nutrients can lower blood pressure. The foods that are highest in these nutrients are fruits, vegetables, low-fat dairy products, nuts, seeds, and legumes. But taking calcium, potassium, and magnesium supplements instead of eating foods that are high in those nutrients does not have the same effect. The DASH diet also includes whole grains, fish, and poultry. The DASH diet is one of several lifestyle changes your doctor may recommend to lower your high blood pressure. Your doctor may also want you to decrease the amount of sodium in your diet. Lowering sodium while following the DASH diet can lower blood pressure even further than just the DASH diet alone. Follow-up care is a key part of your treatment and safety. Be sure to make and go to all appointments, and call your doctor if you are having problems. It's also a good idea to know your test results and keep a list of the medicines you take. How can you care for yourself at home? Following the DASH diet  · Eat 4 to 5 servings of fruit each day. A serving is 1 medium-sized piece of fruit, ½ cup chopped or canned fruit, 1/4 cup dried fruit, or 4 ounces (½ cup) of fruit juice. Choose fruit more often than fruit juice. · Eat 4 to 5 servings of vegetables each day. A serving is 1 cup of lettuce or raw leafy vegetables, ½ cup of chopped or cooked vegetables, or 4 ounces (½ cup) of vegetable juice. Choose vegetables more often than vegetable juice. · Get 2 to 3 servings of low-fat and fat-free dairy each day. A serving is 8 ounces of milk, 1 cup of yogurt, or 1 ½ ounces of cheese. · Eat 6 to 8 servings of grains each day.  A serving is 1 slice of bread, 1 ounce of dry cereal, or ½ cup of cooked rice, pasta, or cooked cereal. Try to choose whole-grain products as much as possible. · Limit lean meat, poultry, and fish to 2 servings each day. A serving is 3 ounces, about the size of a deck of cards. · Eat 4 to 5 servings of nuts, seeds, and legumes (cooked dried beans, lentils, and split peas) each week. A serving is 1/3 cup of nuts, 2 tablespoons of seeds, or ½ cup of cooked beans or peas. · Limit fats and oils to 2 to 3 servings each day. A serving is 1 teaspoon of vegetable oil or 2 tablespoons of salad dressing. · Limit sweets and added sugars to 5 servings or less a week. A serving is 1 tablespoon jelly or jam, ½ cup sorbet, or 1 cup of lemonade. · Eat less than 2,300 milligrams (mg) of sodium a day. If you limit your sodium to 1,500 mg a day, you can lower your blood pressure even more. · Be aware that all of these are the suggested number of servings for people who eat 1,800 to 2,000 calories a day. Your recommended number of servings may be different if you need more or fewer calories. Tips for success  · Start small. Do not try to make dramatic changes to your diet all at once. You might feel that you are missing out on your favorite foods and then be more likely to not follow the plan. Make small changes, and stick with them. Once those changes become habit, add a few more changes. · Try some of the following:  ? Make it a goal to eat a fruit or vegetable at every meal and at snacks. This will make it easy to get the recommended amount of fruits and vegetables each day. ? Try yogurt topped with fruit and nuts for a snack or healthy dessert. ? Add lettuce, tomato, cucumber, and onion to sandwiches. ? Combine a ready-made pizza crust with low-fat mozzarella cheese and lots of vegetable toppings. Try using tomatoes, squash, spinach, broccoli, carrots, cauliflower, and onions. ?  Have a variety of cut-up vegetables with a low-fat dip as an appetizer instead of chips and dip. ? Sprinkle sunflower seeds or chopped almonds over salads. Or try adding chopped walnuts or almonds to cooked vegetables. ? Try some vegetarian meals using beans and peas. Add garbanzo or kidney beans to salads. Make burritos and tacos with mashed payne beans or black beans. Where can you learn more? Go to http://www.herrera.com/  Enter H967 in the search box to learn more about \"DASH Diet: Care Instructions. \"  Current as of: April 29, 2021               Content Version: 13.0  © 0993-4477 [employer]. Care instructions adapted under license by [employer] (which disclaims liability or warranty for this information). If you have questions about a medical condition or this instruction, always ask your healthcare professional. [address] any warranty or liability for your use of this information.

## 2021-10-18 NOTE — PROGRESS NOTES
Chief Complaint   Patient presents with    Results     labs     HPI:  Priscilla Mosqueda is a 42 y.o. AA male presents for result review. A1C (9.9% from 10%) shows poorly controlled diabetes, Serum glucose is up, there is glucose present in urine. Patient follows Dr. Josefina Watson. Triglyceride level shows improvement. Review of Systems  As per hpi    Past Medical History:   Diagnosis Date    Anxiety and depression     Bleeding of eye, left     21 pt reports receiving injections in right eye for beeding    Chronic headaches     COVID-19 2020    [facility] [address]     GERD (gastroesophageal reflux disease)     Hypercholesterolemia     Hypertension     Hypothyroidism     MI (myocardial infarction) ([facility] [address]     as of 21 pt reports 8 stents total    WALLY on CPAP      Past Surgical History:   Procedure Laterality Date    HX CARPAL TUNNEL RELEASE Left     HX CARPAL TUNNEL RELEASE Right     CTE trigger thumb and index finger    HX HEART CATHETERIZATION      HX LAP CHOLECYSTECTOMY  14    Dr Susan Baldwin History     Socioeconomic History    Marital status:      Spouse name: Not on file    Number of children: Not on file    Years of education: Not on file    Highest education level: Not on file   Tobacco Use    Smoking status: Former Smoker     Packs/day: 0.00     Years: 14.00     Pack years: 0.00     Quit date: 2014     Years since quittin.8    Smokeless tobacco: Never Used   Substance and Sexual Activity    Alcohol use: No    Drug use: No    Sexual activity: Not Currently     Social Determinants of Health     Financial Resource Strain:     Difficulty of Paying Living Expenses:    Food Insecurity:     Worried About Running Out of Food in the Last Year:     Ran Out of Food in the Last Year:    Transportation Needs:     Lack of Transportation (Medical):      Lack of Transportation (Non-Medical):    Physical Activity:  Days of Exercise per Week:     Minutes of Exercise per Session:    Stress:     Feeling of Stress :    Social Connections:     Frequency of Communication with Friends and Family:     Frequency of Social Gatherings with Friends and Family:     Attends Tenriism Services:     Active Member of Clubs or Organizations:     Attends Club or Organization Meetings:     Marital Status:      Family History   Problem Relation Age of Onset    Diabetes Mother     Heart Disease Father     Cancer Father     Diabetes Father     Diabetes Paternal Aunt     Diabetes Maternal Grandmother     Thyroid Cancer Maternal Grandmother     Kidney Disease Maternal Grandmother     Arthritis Maternal Grandmother     Heart Disease Maternal Grandfather     High Cholesterol Maternal Grandfather     Hypertension Maternal Grandfather     Diabetes Paternal Grandmother     Hemophilia Paternal Grandfather      Current Outpatient Medications   Medication Sig Dispense Refill    prednisoLONE acetate (PRED FORTE) 1 % ophthalmic suspension       ofloxacin (FLOXIN) 0.3 % ophthalmic solution       busPIRone (BUSPAR) 15 mg tablet Take 1 tablet by mouth twice daily 60 Tablet 5    sertraline (ZOLOFT) 100 mg tablet Take 1 tablet by mouth once daily 30 Tablet 0    metFORMIN (GLUCOPHAGE) 500 mg tablet Take 1 Tablet by mouth two (2) times daily (with meals). 180 Tablet 3    insulin regular (HumuLIN R Regular U-100 Insuln) 100 unit/mL injection 30 units with breakfast,30 units with lunch and  30 units with dinner plus correction. 160 units per day max 50 mL 3    insulin glargine (Lantus U-100 Insulin) 100 unit/mL injection INJECT 95 UNITS SUBCUTANEOUSLY IN THE MORNING AND 95 UNITS AT NIGHT 60 mL 1    lisinopriL (PRINIVIL, ZESTRIL) 20 mg tablet Take 10 mg by mouth two (2) times a day.       levothyroxine (SYNTHROID) 112 mcg tablet TAKE 1 TABLET BY MOUTH ONCE DAILY BEFORE BREAKFAST 90 Tablet 2    testosterone cypionate (DEPOTESTOTERONE CYPIONATE) 200 mg/mL injection INJECT 1/2 ML (100 MG) INTRAMUSCULARLY EVERY WEEK 2 mL 4    flash glucose sensor (FreeStyle Mikal 2 Sensor) kit Change sensor every 14 days 2 Kit 3    flash glucose scanning reader (FreeStyle Mikal 2 [place]) misc Change sensor every 14 days 1 Each 0    cholecalciferol (Vitamin D3) (5000 Units/125 mcg) tab tablet Take 1 Tab by mouth daily. 90 Tab 1    Insulin Syringe-Needle U-100 (BD Insulin Syringe) 1 mL 25 x 1\" syrg Use for drawing up/injecting testosterone once weekly. 100 Each 3    Syringe with Needle, Disp, 1 mL 25 gauge x 5/8\" syrg Use with testosterone once every week 50 Syringe 3    rosuvastatin (CRESTOR) 20 mg tablet Take 1 tablet by mouth nightly 30 Tab 0    LORazepam (ATIVAN) 1 mg tablet Take 1 Tab by mouth every four (4) hours as needed for Anxiety. Max Daily Amount: 6 mg. 30 Tab 5    aluminum & magnesium hydroxide-simethicone (Maalox Maximum Strength) 400-400-40 mg/5 mL suspension Take 10 mL by mouth every six (6) hours as needed for Indigestion.  Omeprazole delayed release (PRILOSEC D/R) 20 mg tablet Take 1 Tab by mouth daily. 20 Tab 0    clopidogrel (PLAVIX) 75 mg tab TAKE 1 TABLET BY MOUTH ONCE DAILY      nitroglycerin (NITROSTAT) 0.4 mg SL tablet Take 1 Tab by mouth every five (5) minutes as needed for Chest Pain. Sit down then put one tab under the tongue every 5 minutes as needed 1 Bottle 0    metoprolol succinate (TOPROL-XL) 25 mg XL tablet Take 1 Tab by mouth daily. (Patient taking differently: Take 25 mg by mouth two (2) times a day.) 90 Tab 3    aspirin delayed-release 81 mg tablet Take 1 Tab by mouth daily.  30 Tab 0     Allergies   Allergen Reactions    Morphine Nausea and Vomiting    Penicillin G Swelling    Percocet [Oxycodone-Acetaminophen] Hives and Nausea and Vomiting    Sulfa (Sulfonamide Antibiotics) Swelling    Tramadol Nausea Only     Nausea and headache       Objective:  Visit Vitals  BP (!) 147/70 Comment: no medicaion taken Pulse 74   Temp 97.5 °F (36.4 °C)   Resp 20   Ht 5' 9\" (1.753 m)   Wt 265 lb (120.2 kg)   SpO2 99%   BMI 39.13 kg/m²     Physical Exam:   General appearance - alert, well appearing in no distress  Mental status - alert, oriented to person, place, and time  Chest - clear to auscultation, no wheezes, rales or rhonchi  Heart - normal rate, regular rhythm, no murmurs  Abdomen - soft, nontender, nondistended, no organomegaly  Ext-peripheral pulses normal, no pedal edema    Results for orders placed or performed in visit on 10/07/21   HEPATITIS C AB   Result Value Ref Range    Hep C virus Ab Interp. NONREACTIVE NONREACTIVE     HEMOGLOBIN A1C WITH EAG   Result Value Ref Range    Hemoglobin A1c 9.9 (H) 4.0 - 5.6 %    Est. average glucose 237 mg/dL   LIPID PANEL   Result Value Ref Range    Cholesterol, total 142 <200 MG/DL    Triglyceride 223 (H) <150 MG/DL    HDL Cholesterol 35 MG/DL    LDL, calculated 62.4 0 - 100 MG/DL    VLDL, calculated 44.6 MG/DL    CHOL/HDL Ratio 4.1 0.0 - 5.0     METABOLIC PANEL, COMPREHENSIVE   Result Value Ref Range    Sodium 132 (L) 136 - 145 mmol/L    Potassium 4.5 3.5 - 5.1 mmol/L    Chloride 101 97 - 108 mmol/L    CO2 28 21 - 32 mmol/L    Anion gap 3 (L) 5 - 15 mmol/L    Glucose 334 (H) 65 - 100 mg/dL    BUN 18 6 - 20 MG/DL    Creatinine 1.15 0.70 - 1.30 MG/DL    BUN/Creatinine ratio 16 12 - 20      GFR est AA >60 >60 ml/min/1.73m2    GFR est non-AA >60 >60 ml/min/1.73m2    Calcium 9.2 8.5 - 10.1 MG/DL    Bilirubin, total 0.6 0.2 - 1.0 MG/DL    ALT (SGPT) 44 12 - 78 U/L    AST (SGOT) 17 15 - 37 U/L    Alk.  phosphatase 93 45 - 117 U/L    Protein, total 7.9 6.4 - 8.2 g/dL    Albumin 3.7 3.5 - 5.0 g/dL    Globulin 4.2 (H) 2.0 - 4.0 g/dL    A-G Ratio 0.9 (L) 1.1 - 2.2     URINALYSIS W/ RFLX MICROSCOPIC   Result Value Ref Range    Color YELLOW/STRAW      Appearance CLEAR CLEAR      Specific gravity 1.023 1.003 - 1.030      pH (UA) 5.5 5.0 - 8.0      Protein TRACE (A) Negative mg/dL    Glucose >1,000 (A) Negative mg/dL    Ketone Negative Negative mg/dL    Bilirubin Negative Negative      Blood Negative Negative      Urobilinogen 0.2 0.2 - 1.0 EU/dL    Nitrites Negative Negative      Leukocyte Esterase Negative Negative      WBC 0-4 0 - 4 /hpf    RBC 0-5 0 - 5 /hpf    Epithelial cells FEW FEW /lpf    Bacteria Negative Negative /hpf    Hyaline cast 0-2 0 - 5 /lpf   TSH 3RD GENERATION   Result Value Ref Range    TSH 1.71 0.36 - 3.74 uIU/mL     Assessment/Plan:  Diagnoses and all orders for this visit:    Poorly controlled diabetes mellitus (HCC)    Hypertension goal BP (blood pressure) < 130/80    Hypertriglyceridemia      Patient Instructions        DASH Diet: Care Instructions  Your Care Instructions     The DASH diet is an eating plan that can help lower your blood pressure. DASH stands for Dietary Approaches to Stop Hypertension. Hypertension is high blood pressure. The DASH diet focuses on eating foods that are high in calcium, potassium, and magnesium. These nutrients can lower blood pressure. The foods that are highest in these nutrients are fruits, vegetables, low-fat dairy products, nuts, seeds, and legumes. But taking calcium, potassium, and magnesium supplements instead of eating foods that are high in those nutrients does not have the same effect. The DASH diet also includes whole grains, fish, and poultry. The DASH diet is one of several lifestyle changes your doctor may recommend to lower your high blood pressure. Your doctor may also want you to decrease the amount of sodium in your diet. Lowering sodium while following the DASH diet can lower blood pressure even further than just the DASH diet alone. Follow-up care is a key part of your treatment and safety. Be sure to make and go to all appointments, and call your doctor if you are having problems. It's also a good idea to know your test results and keep a list of the medicines you take. How can you care for yourself at home?   Following the DASH diet  · Eat 4 to 5 servings of fruit each day. A serving is 1 medium-sized piece of fruit, ½ cup chopped or canned fruit, 1/4 cup dried fruit, or 4 ounces (½ cup) of fruit juice. Choose fruit more often than fruit juice. · Eat 4 to 5 servings of vegetables each day. A serving is 1 cup of lettuce or raw leafy vegetables, ½ cup of chopped or cooked vegetables, or 4 ounces (½ cup) of vegetable juice. Choose vegetables more often than vegetable juice. · Get 2 to 3 servings of low-fat and fat-free dairy each day. A serving is 8 ounces of milk, 1 cup of yogurt, or 1 ½ ounces of cheese. · Eat 6 to 8 servings of grains each day. A serving is 1 slice of bread, 1 ounce of dry cereal, or ½ cup of cooked rice, pasta, or cooked cereal. Try to choose whole-grain products as much as possible. · Limit lean meat, poultry, and fish to 2 servings each day. A serving is 3 ounces, about the size of a deck of cards. · Eat 4 to 5 servings of nuts, seeds, and legumes (cooked dried beans, lentils, and split peas) each week. A serving is 1/3 cup of nuts, 2 tablespoons of seeds, or ½ cup of cooked beans or peas. · Limit fats and oils to 2 to 3 servings each day. A serving is 1 teaspoon of vegetable oil or 2 tablespoons of salad dressing. · Limit sweets and added sugars to 5 servings or less a week. A serving is 1 tablespoon jelly or jam, ½ cup sorbet, or 1 cup of lemonade. · Eat less than 2,300 milligrams (mg) of sodium a day. If you limit your sodium to 1,500 mg a day, you can lower your blood pressure even more. · Be aware that all of these are the suggested number of servings for people who eat 1,800 to 2,000 calories a day. Your recommended number of servings may be different if you need more or fewer calories. Tips for success  · Start small. Do not try to make dramatic changes to your diet all at once. You might feel that you are missing out on your favorite foods and then be more likely to not follow the plan.  Make small changes, and stick with them. Once those changes become habit, add a few more changes. · Try some of the following:  ? Make it a goal to eat a fruit or vegetable at every meal and at snacks. This will make it easy to get the recommended amount of fruits and vegetables each day. ? Try yogurt topped with fruit and nuts for a snack or healthy dessert. ? Add lettuce, tomato, cucumber, and onion to sandwiches. ? Combine a ready-made pizza crust with low-fat mozzarella cheese and lots of vegetable toppings. Try using tomatoes, squash, spinach, broccoli, carrots, cauliflower, and onions. ? Have a variety of cut-up vegetables with a low-fat dip as an appetizer instead of chips and dip. ? Sprinkle sunflower seeds or chopped almonds over salads. Or try adding chopped walnuts or almonds to cooked vegetables. ? Try some vegetarian meals using beans and peas. Add garbanzo or kidney beans to salads. Make burritos and tacos with mashed payne beans or black beans. Where can you learn more? Go to http://www.herrera.com/  Enter H967 in the search box to learn more about \"DASH Diet: Care Instructions. \"  Current as of: April 29, 2021               Content Version: 13.0  © 3990-7765 Healthwise, Incorporated. Care instructions adapted under license by [employer] (which disclaims liability or warranty for this information). If you have questions about a medical condition or this instruction, always ask your healthcare professional. [place] [address] any warranty or liability for your use of this information. Follow-up and Dispositions    · Return in about 3 months (around 1/18/2022), or if symptoms worsen or fail to improve, for routine follow up.

## 2021-10-19 ENCOUNTER — TELEPHONE (OUTPATIENT)
Dept: ENDOCRINOLOGY | Age: 41
End: 2021-10-19

## 2021-10-19 NOTE — TELEPHONE ENCOUNTER
----- Message from [facility] sent at 10/19/2021  2:08 PM EDT -----  Regarding: Dr. Shivani Jackson telephone  Contact: [phone]  General Message/Vendor Calls    Caller's first and last name: Jai oMta   Mother       Reason for call: Patient wanted to make aware to keep copies from procedure.       Callback required yes/no and why: no      Best contact number(s): [phone]      Details to clarify the request: [address] [place] [address], [address]

## 2021-10-19 NOTE — TELEPHONE ENCOUNTER
Spoke with patient. He states that he has another copy and does not need the pre and post op instructions back. Advised that we do not need them, but to ask his eye doctor to fax a report to Jose Raman. He states that he has an appt tomorrow.

## 2021-10-20 ENCOUNTER — OFFICE VISIT (OUTPATIENT)
Dept: SLEEP MEDICINE | Age: 41
End: 2021-10-20
Payer: COMMERCIAL

## 2021-10-20 VITALS
TEMPERATURE: 97 F | WEIGHT: 261 LBS | SYSTOLIC BLOOD PRESSURE: 112 MMHG | HEART RATE: 84 BPM | RESPIRATION RATE: 20 BRPM | DIASTOLIC BLOOD PRESSURE: 69 MMHG | BODY MASS INDEX: 38.54 KG/M2 | OXYGEN SATURATION: 97 %

## 2021-10-20 DIAGNOSIS — I10 ESSENTIAL HYPERTENSION: ICD-10-CM

## 2021-10-20 DIAGNOSIS — G47.33 OSA (OBSTRUCTIVE SLEEP APNEA): Primary | ICD-10-CM

## 2021-10-20 PROCEDURE — 99214 OFFICE O/P EST MOD 30 MIN: CPT | Performed by: INTERNAL MEDICINE

## 2021-10-20 NOTE — PROGRESS NOTES
AirCurve 10 VAuto   Settings sent to device  Set Mode to Advance Auto    Set Min EPAP to 11.0 cmH2O   Set Max IPAP to 25.0 cmH2O   Set Pressure support to 4.0 cmH2O   Set Ramp enable to On   Set Ramp time to 30 min   Set Start EPAP to 4.0 cmH2O  Previous Settings  Set Mode to Advance Auto    Set Min EPAP to 9.0 cmH2O   Set Max IPAP to 20.0 cmH2O   Set Pressure support to 4.0 cmH2O   Set Ramp enable to On   Set Ramp time to 30 min   Set Start EPAP to 4.0 cmH2O    Settings sent to device via [employer]  per Dr. Roland Ackerman

## 2021-10-20 NOTE — PROGRESS NOTES
[address] [facility] [place]. [place], [address]  Tel.  [phone]  Fax. [address] [facility] [address]  [place], [address] [facility]  Tel.  [phone]  Fax. [phone] [address]  Tel.  [phone]  Fax. [phone] BUTCH Sharma (: 1980) is a 42 y.o. male, established patient, seen for positive airway pressure follow-up and evaluation of the following chief complaint(s):   Sleep Problem (1st Adherence visit)       Jacek Campa was last seen by me on 21, prior notes reviewed in detail. Home Sleep Apnea Test (HSAT) performed on 21 showed an AHI of 76.3/hr with a lowest SpO2 of 71%, duration of SpO2 < 88% 70.2 minutes. ASSESSMENT/PLAN:    ICD-10-CM ICD-9-CM    1. WALLY (obstructive sleep apnea)  G47.33 327.23    2. Essential hypertension  I10 401.9    3. BMI 39.0-39.9,adult  Z68.39 V85.39        On ResMed:  AirCurve 10 VAuto:      Settings:  Mode - VAuto    Max IPAP: 20 cmH2O    Min EPAP: 09 cmH2O    PS: 04 cmH2O      He is adherent with PAP therapy and PAP continues to benefit patient and remains necessary for control of his sleep apnea. Follow-up and Dispositions    · Return for follow-up with nurse practioner in 3 months. 1. Sleep Apnea -   * Change pressure setting to                            Mode - VAuto    Max IPAP: 25 cmH2O    Min EPAP: 11 cmH2O    PS: 04 cmH2O      * He is familiar with the telephone monitoring application, is willing to track therapy and agrees to notify use if AHI is >5 per hour. * Counseling was provided regarding the importance of regular PAP use with emphasis on ensuring sufficient total sleep time, proper sleep hygiene, and safe driving. * Re-enforced proper and regular cleaning for the device. * He was asked to contact our office for any problems regarding PAP therapy.       2. Recommended a dedicated weight loss program through appropriate diet and exercise regimen as significant weight reduction has been shown to reduce severity of obstructive sleep apnea. SUBJECTIVE/OBJECTIVE:    He  is seen today for follow up on PAP device and reports no problems using the device. The following concerns identified:    Drowsiness no Problems exhaling no   Snoring no Forget to put on no   Mask Comfortable yes Can't fall asleep no   Dry Mouth no Mask falls off no   Air Leaking no Frequent awakenings no       He admits that his sleep has improved on PAP therapy using FF mask and heated tubing. Review of device download indicated:    Usage Days >= 4 hours 77 % over 30 days  Median Usage  5 hour 10 minutes    EPAP:  Median (avg) 10.9 cmH2O  95th % (avg) 14.3 cmH2O  Max (avg) 16.1cmH2O    IPAP:  Median (avg) 15.4 cmH2O  95th % (avg) 18.8 cmH2O  Max (avg) 19.7cmH2O    Average AHI: 10.1 /hr which reflects improved sleep breathing condition. [place] Sleepiness Score: 13   Modified F.O.S.Q. Score Total / 2: 15.5       Sleep Review of Systems: notable for Negative difficulty falling asleep; Positive awakenings at night; Negative  early morning headaches; Negative  memory problems; Negative  concentration issues; Positive  chest pain has been checked out; Positive  shortness of breath;  Negative rashes or itching; Negative heartburn / belching / flatulence; Negative  significant mood issues; occasional afternoon naps per week.       Visit Vitals  /69 (BP 1 Location: Right arm, BP Patient Position: Sitting, BP Cuff Size: Large adult)   Pulse 84   Temp 97 °F (36.1 °C) (Oral)   Resp 20   Wt 261 lb (118.4 kg)   SpO2 97%   BMI 38.54 kg/m²         General:   Not in acute distress   Eyes:  Anicteric sclerae, no obvious strabismus   Nose:  No obvious nasal septum deviation    Oropharynx:   Class 4 oropharyngeal outlet, thick tongue base, uvula not seen due to low-lying soft palate, narrow tonsilo-pharyngeal pilars   Tonsils:   tonsils are not visualized due to low-lying soft palate   Neck: midline trachea   Chest/Lungs:  Equal lung expansion, clear on auscultation    CVS:  Normal rate, regular rhythm; no JVD   Skin:  Warm to touch; no obvious rashes   Neuro:  No focal deficits ; no obvious tremor    Psych:  Normal affect,  normal countenance; An electronic signature was used to authenticate this note. Say Hernandez MD, FAASM  Electronically signed.  10/20/21

## 2021-10-20 NOTE — PATIENT INSTRUCTIONS
[address] [facility] [place]. [place], [address]  Tel.  [phone]  Fax. [address] [facility] 60  [place], [address] [facility]  Tel.  [phone]  Fax. [phone] [address] [facility] [address]  Tel.  [phone]  Fax. [phone]     Learning About CPAP for Sleep Apnea  What is CPAP? CPAP is a small machine that you use at home every night while you sleep. It increases air pressure in your throat to keep your airway open. When you have sleep apnea, this can help you sleep better so you feel much better. CPAP stands for \"continuous positive airway pressure. \"  The CPAP machine will have one of the following:  · A mask that covers your nose and mouth  · Prongs that fit into your nose  · A mask that covers your nose only, the most common type. This type is called NCPAP. The N stands for \"nasal.\"  Why is it done? CPAP is usually the best treatment for obstructive sleep apnea. It is the first treatment choice and the most widely used. Your doctor may suggest CPAP if you have:  · Moderate to severe sleep apnea. · Sleep apnea and coronary artery disease (CAD) or heart failure. How does it help? · CPAP can help you have more normal sleep, so you feel less sleepy and more alert during the daytime. · CPAP may help keep heart failure or other heart problems from getting worse. · NCPAP may help lower your blood pressure. · If you use CPAP, your bed partner may also sleep better because you are not snoring or restless. What are the side effects? Some people who use CPAP have:  · A dry or stuffy nose and a sore throat. · Irritated skin on the face. · Sore eyes. · Bloating. If you have any of these problems, work with your doctor to fix them. Here are some things you can try:  · Be sure the mask or nasal prongs fit well. · See if your doctor can adjust the pressure of your CPAP. · If your nose is dry, try a humidifier.   · If your nose is runny or stuffy, try decongestant medicine or a steroid nasal spray. If these things do not help, you might try a different type of machine. Some machines have air pressure that adjusts on its own. Others have air pressures that are different when you breathe in than when you breathe out. This may reduce discomfort caused by too much pressure in your nose. Where can you learn more? Go to Informantonline.be  Enter Ron Alves in the search box to learn more about \"Learning About CPAP for Sleep Apnea. \"   © 3916-8877 Healthwise, Incorporated. Care instructions adapted under license by [facility] [employer] (which disclaims liability or warranty for this information). This care instruction is for use with your licensed healthcare professional. If you have questions about a medical condition or this instruction, always ask your healthcare professional. [address] any warranty or liability for your use of this information. Content Version: 1.0.17852; Last Revised: January 11, 2010  PROPER SLEEP HYGIENE    What to avoid  · Do not have drinks with caffeine, such as coffee or black tea, for 8 hours before bed. · Do not smoke or use other types of tobacco near bedtime. Nicotine is a stimulant and can keep you awake. · Avoid drinking alcohol late in the evening, because it can cause you to wake in the middle of the night. · Do not eat a big meal close to bedtime. If you are hungry, eat a light snack. · Do not drink a lot of water close to bedtime, because the need to urinate may wake you up during the night. · Do not read or watch TV in bed. Use the bed only for sleeping and sexual activity. What to try  · Go to bed at the same time every night, and wake up at the same time every morning. Do not take naps during the day. · Keep your bedroom quiet, dark, and cool. · Get regular exercise, but not within 3 to 4 hours of your bedtime. .  · Sleep on a comfortable pillow and mattress.   · If watching the clock makes you anxious, turn it facing away from you so you cannot see the time. · If you worry when you lie down, start a worry book. Well before bedtime, write down your worries, and then set the book and your concerns aside. · Try meditation or other relaxation techniques before you go to bed. · If you cannot fall asleep, get up and go to another room until you feel sleepy. Do something relaxing. Repeat your bedtime routine before you go to bed again. · Make your house quiet and calm about an hour before bedtime. Turn down the lights, turn off the TV, log off the computer, and turn down the volume on music. This can help you relax after a busy day. Drowsy Driving: The [facility] cites drowsiness as a causing factor in more than 724,918 police reported crashes annually, resulting in 76,000 injuries and 1,500 deaths. Other surveys suggest 55% of people polled have driven while drowsy in the past year, 23% had fallen asleep but not crashed, 3% crashed, and 2% had and accident due to drowsy driving. Who is at risk? Young Drivers: One study of drowsy driving accidents states that 55% of the drivers were under 25 years. Of those, 75% were male. Shift Workers and Travelers: People who work overnight or travel across time zones frequently are at higher risk of experiencing Circadian Rhythm Disorders. They are trying to work and function when their body is programed to sleep. Sleep Deprived: Lack of sleep has a serious impact on your ability to pay attention or focus on a task. Consistently getting less than the average of 8 hours your body needs creates partial or cumulative sleep deprivation. Untreated Sleep Disorders: Sleep Apnea, Narcolepsy, R.L.S., and other sleep disorders (untreated) prevent a person from getting enough restful sleep. This leads to excessive daytime sleepiness and increases the risk for drowsy driving accidents by up to 7 times.   Medications / Alcohol: Even over the counter medications can cause drowsiness. Medications that impair a drivers attention should have a warning label. Alcohol naturally makes you sleepy and on its own can cause accidents. Combined with excessive drowsiness its effects are amplified. Signs of Drowsy Driving:   * You don't remember driving the last few miles   * You may drift out of your sheyla   * You are unable to focus and your thoughts wander   * You may yawn more often than normal   * You have difficulty keeping your eyes open / nodding off   * Missing traffic signs, speeding, or tailgating  Prevention-   Good sleep hygiene, lifestyle and behavioral choices have the most impact on drowsy driving. There is no substitute for sleep and the average person requires 8 hours nightly. If you find yourself driving drowsy, stop and sleep. Consider the sleep hygiene tips provided during your visit as well. Medication Refill Policy: Refills for all medications require 1 week advance notice. Please have your pharmacy fax a refill request. We are unable to fax, or call in \"controled substance\" medications and you will need to pick these prescriptions up from our office. Take the Interview Activation    Thank you for requesting access to Take the Interview. Please follow the instructions below to securely access and download your online medical record. Take the Interview allows you to send messages to your doctor, view your test results, renew your prescriptions, schedule appointments, and more. How Do I Sign Up? 1. In your internet browser, go to [email or web address] 2. Click on the First Time User? Click Here link in the Sign In box. You will see the New Member Sign Up page. 3. Enter your Take the Interview Access Code exactly as it appears below. You will not need to use this code after youve completed the sign-up process. If you do not sign up before the expiration date, you must request a new code. Take the Interview Access Code:  Activation code not generated  Current Leho Status: Active (This is the date your Leho access code will )    4. Enter the last four digits of your Social Security Number (xxxx) and Date of Birth (mm/dd/yyyy) as indicated and click Submit. You will be taken to the next sign-up page. 5. Create a Orth[employer] Kinematicst ID. This will be your [employer] Kinematic[employer] login ID and cannot be changed, so think of one that is secure and easy to remember. 6. Create a Leho password. You can change your password at any time. 7. Enter your Password Reset Question and Answer. This can be used at a later time if you forget your password. 8. Enter your e-mail address. You will receive e-mail notification when new information is available in [address] 9. Click Sign Up. You can now view and download portions of your medical record. 10. Click the Download Summary menu link to download a portable copy of your medical information. Additional Information    If you have questions, please call [phone]. Remember, Leho is NOT to be used for urgent needs. For medical emergencies, dial 911.

## 2021-10-27 ENCOUNTER — HOSPITAL ENCOUNTER (OUTPATIENT)
Age: 41
Setting detail: OBSERVATION
Discharge: HOME | End: 2021-10-31
Attending: EMERGENCY MEDICINE | Admitting: INTERNAL MEDICINE
Payer: COMMERCIAL

## 2021-10-27 ENCOUNTER — APPOINTMENT (OUTPATIENT)
Dept: GENERAL RADIOLOGY | Age: 41
End: 2021-10-27
Attending: EMERGENCY MEDICINE
Payer: COMMERCIAL

## 2021-10-27 DIAGNOSIS — I21.4 NSTEMI (NON-ST ELEVATED MYOCARDIAL INFARCTION) (HCC): Primary | ICD-10-CM

## 2021-10-27 DIAGNOSIS — R07.9 CHEST PAIN, UNSPECIFIED TYPE: ICD-10-CM

## 2021-10-27 LAB
COMMENT, HOLDF: NORMAL
SAMPLES BEING HELD,HOLD: NORMAL

## 2021-10-27 PROCEDURE — 84484 ASSAY OF TROPONIN QUANT: CPT

## 2021-10-27 PROCEDURE — 82550 ASSAY OF CK (CPK): CPT

## 2021-10-27 PROCEDURE — 85025 COMPLETE CBC W/AUTO DIFF WBC: CPT

## 2021-10-27 PROCEDURE — 99284 EMERGENCY DEPT VISIT MOD MDM: CPT

## 2021-10-27 PROCEDURE — 36415 COLL VENOUS BLD VENIPUNCTURE: CPT

## 2021-10-27 PROCEDURE — 85379 FIBRIN DEGRADATION QUANT: CPT

## 2021-10-27 PROCEDURE — 93005 ELECTROCARDIOGRAM TRACING: CPT

## 2021-10-27 PROCEDURE — 80053 COMPREHEN METABOLIC PANEL: CPT

## 2021-10-27 PROCEDURE — 83880 ASSAY OF NATRIURETIC PEPTIDE: CPT

## 2021-10-27 PROCEDURE — 71045 X-RAY EXAM CHEST 1 VIEW: CPT

## 2021-10-27 RX ORDER — ONDANSETRON 2 MG/ML
4 INJECTION INTRAMUSCULAR; INTRAVENOUS ONCE
Status: COMPLETED | OUTPATIENT
Start: 2021-10-27 | End: 2021-10-28

## 2021-10-27 NOTE — Clinical Note
TRANSFER - OUT REPORT:     Verbal report given to: Chris Cerda. Report consisted of patient's Situation, Background, Assessment and   Recommendations(SBAR). Opportunity for questions and clarification was provided. Patient transported with a Registered Nurse. [facility]

## 2021-10-28 ENCOUNTER — APPOINTMENT (OUTPATIENT)
Dept: CT IMAGING | Age: 41
End: 2021-10-28
Attending: EMERGENCY MEDICINE
Payer: COMMERCIAL

## 2021-10-28 LAB
ALBUMIN SERPL-MCNC: 3.5 G/DL (ref 3.5–5)
ALBUMIN/GLOB SERPL: 1 {RATIO} (ref 1.1–2.2)
ALP SERPL-CCNC: 97 U/L (ref 45–117)
ALT SERPL-CCNC: 59 U/L (ref 12–78)
ANION GAP SERPL CALC-SCNC: 6 MMOL/L (ref 5–15)
AST SERPL-CCNC: 21 U/L (ref 15–37)
ATRIAL RATE: 87 BPM
BASOPHILS # BLD: 0 K/UL (ref 0–0.1)
BASOPHILS NFR BLD: 0 % (ref 0–1)
BILIRUB SERPL-MCNC: 0.4 MG/DL (ref 0.2–1)
BNP SERPL-MCNC: 72 PG/ML
BUN SERPL-MCNC: 16 MG/DL (ref 6–20)
BUN/CREAT SERPL: 13 (ref 12–20)
CALCIUM SERPL-MCNC: 9.8 MG/DL (ref 8.5–10.1)
CALCULATED P AXIS, ECG09: 47 DEGREES
CALCULATED R AXIS, ECG10: 6 DEGREES
CALCULATED T AXIS, ECG11: 108 DEGREES
CHLORIDE SERPL-SCNC: 99 MMOL/L (ref 97–108)
CK SERPL-CCNC: 100 U/L (ref 39–308)
CO2 SERPL-SCNC: 26 MMOL/L (ref 21–32)
CREAT SERPL-MCNC: 1.26 MG/DL (ref 0.7–1.3)
D DIMER PPP FEU-MCNC: 0.2 MG/L FEU (ref 0–0.65)
DIAGNOSIS, 93000: NORMAL
DIFFERENTIAL METHOD BLD: NORMAL
EOSINOPHIL # BLD: 0.2 K/UL (ref 0–0.4)
EOSINOPHIL NFR BLD: 2 % (ref 0–7)
ERYTHROCYTE [DISTWIDTH] IN BLOOD BY AUTOMATED COUNT: 13.9 % (ref 11.5–14.5)
GLOBULIN SER CALC-MCNC: 3.6 G/DL (ref 2–4)
GLUCOSE BLD STRIP.AUTO-MCNC: 425 MG/DL (ref 65–117)
GLUCOSE BLD STRIP.AUTO-MCNC: 461 MG/DL (ref 65–117)
GLUCOSE BLD STRIP.AUTO-MCNC: 464 MG/DL (ref 65–117)
GLUCOSE SERPL-MCNC: 368 MG/DL (ref 65–100)
HCT VFR BLD AUTO: 38.2 % (ref 36.6–50.3)
HGB BLD-MCNC: 13.1 G/DL (ref 12.1–17)
IMM GRANULOCYTES # BLD AUTO: 0 K/UL (ref 0–0.04)
IMM GRANULOCYTES NFR BLD AUTO: 0 % (ref 0–0.5)
LYMPHOCYTES # BLD: 2.2 K/UL (ref 0.8–3.5)
LYMPHOCYTES NFR BLD: 28 % (ref 12–49)
MCH RBC QN AUTO: 28.7 PG (ref 26–34)
MCHC RBC AUTO-ENTMCNC: 34.3 G/DL (ref 30–36.5)
MCV RBC AUTO: 83.6 FL (ref 80–99)
METAMYELOCYTES NFR BLD MANUAL: 5 %
MONOCYTES # BLD: 0.4 K/UL (ref 0–1)
MONOCYTES NFR BLD: 5 % (ref 5–13)
NEUTS BAND NFR BLD MANUAL: 1 %
NEUTS SEG # BLD: 4.8 K/UL (ref 1.8–8)
NEUTS SEG NFR BLD: 59 % (ref 32–75)
NRBC # BLD: 0 K/UL (ref 0–0.01)
NRBC BLD-RTO: 0 PER 100 WBC
P-R INTERVAL, ECG05: 158 MS
PLATELET # BLD AUTO: 186 K/UL (ref 150–400)
PMV BLD AUTO: 10 FL (ref 8.9–12.9)
POTASSIUM SERPL-SCNC: 4.1 MMOL/L (ref 3.5–5.1)
PROT SERPL-MCNC: 7.1 G/DL (ref 6.4–8.2)
Q-T INTERVAL, ECG07: 334 MS
QRS DURATION, ECG06: 88 MS
QTC CALCULATION (BEZET), ECG08: 401 MS
RBC # BLD AUTO: 4.57 M/UL (ref 4.1–5.7)
RBC MORPH BLD: NORMAL
SERVICE CMNT-IMP: ABNORMAL
SODIUM SERPL-SCNC: 131 MMOL/L (ref 136–145)
TROPONIN-HIGH SENSITIVITY: 24 NG/L (ref 0–76)
TROPONIN-HIGH SENSITIVITY: 63 NG/L (ref 0–76)
TROPONIN-HIGH SENSITIVITY: 69 NG/L (ref 0–76)
VENTRICULAR RATE, ECG03: 87 BPM
WBC # BLD AUTO: 8 K/UL (ref 4.1–11.1)

## 2021-10-28 PROCEDURE — 74011250637 HC RX REV CODE- 250/637: Performed by: STUDENT IN AN ORGANIZED HEALTH CARE EDUCATION/TRAINING PROGRAM

## 2021-10-28 PROCEDURE — 96361 HYDRATE IV INFUSION ADD-ON: CPT

## 2021-10-28 PROCEDURE — 99218 HC RM OBSERVATION: CPT

## 2021-10-28 PROCEDURE — 74011000636 HC RX REV CODE- 636: Performed by: EMERGENCY MEDICINE

## 2021-10-28 PROCEDURE — 82962 GLUCOSE BLOOD TEST: CPT

## 2021-10-28 PROCEDURE — 74011636637 HC RX REV CODE- 636/637: Performed by: INTERNAL MEDICINE

## 2021-10-28 PROCEDURE — 74011250636 HC RX REV CODE- 250/636: Performed by: INTERNAL MEDICINE

## 2021-10-28 PROCEDURE — 74011250636 HC RX REV CODE- 250/636: Performed by: EMERGENCY MEDICINE

## 2021-10-28 PROCEDURE — 84484 ASSAY OF TROPONIN QUANT: CPT

## 2021-10-28 PROCEDURE — 74011250637 HC RX REV CODE- 250/637: Performed by: INTERNAL MEDICINE

## 2021-10-28 PROCEDURE — 74011636637 HC RX REV CODE- 636/637: Performed by: STUDENT IN AN ORGANIZED HEALTH CARE EDUCATION/TRAINING PROGRAM

## 2021-10-28 PROCEDURE — 74011000250 HC RX REV CODE- 250: Performed by: INTERNAL MEDICINE

## 2021-10-28 PROCEDURE — 74177 CT ABD & PELVIS W/CONTRAST: CPT

## 2021-10-28 PROCEDURE — 96374 THER/PROPH/DIAG INJ IV PUSH: CPT

## 2021-10-28 PROCEDURE — 36415 COLL VENOUS BLD VENIPUNCTURE: CPT

## 2021-10-28 RX ORDER — SODIUM CHLORIDE 0.9 % (FLUSH) 0.9 %
5-40 SYRINGE (ML) INJECTION AS NEEDED
Status: DISCONTINUED | OUTPATIENT
Start: 2021-10-28 | End: 2021-10-30 | Stop reason: SDUPTHER

## 2021-10-28 RX ORDER — ONDANSETRON 2 MG/ML
4 INJECTION INTRAMUSCULAR; INTRAVENOUS
Status: DISCONTINUED | OUTPATIENT
Start: 2021-10-28 | End: 2021-10-31 | Stop reason: HOSPADM

## 2021-10-28 RX ORDER — ATORVASTATIN CALCIUM 40 MG/1
40 TABLET, FILM COATED ORAL
Status: DISCONTINUED | OUTPATIENT
Start: 2021-10-28 | End: 2021-10-31 | Stop reason: HOSPADM

## 2021-10-28 RX ORDER — ENOXAPARIN SODIUM 100 MG/ML
40 INJECTION SUBCUTANEOUS DAILY
Status: DISCONTINUED | OUTPATIENT
Start: 2021-10-28 | End: 2021-10-31 | Stop reason: HOSPADM

## 2021-10-28 RX ORDER — INSULIN GLARGINE 100 [IU]/ML
0.2 INJECTION, SOLUTION SUBCUTANEOUS DAILY
Status: DISCONTINUED | OUTPATIENT
Start: 2021-10-28 | End: 2021-10-28

## 2021-10-28 RX ORDER — MAGNESIUM SULFATE 100 %
4 CRYSTALS MISCELLANEOUS AS NEEDED
Status: DISCONTINUED | OUTPATIENT
Start: 2021-10-28 | End: 2021-10-30 | Stop reason: SDUPTHER

## 2021-10-28 RX ORDER — ACETAMINOPHEN 325 MG/1
650 TABLET ORAL
Status: DISCONTINUED | OUTPATIENT
Start: 2021-10-28 | End: 2021-10-31 | Stop reason: HOSPADM

## 2021-10-28 RX ORDER — ASPIRIN 81 MG/1
81 TABLET ORAL DAILY
Status: DISCONTINUED | OUTPATIENT
Start: 2021-10-28 | End: 2021-10-31 | Stop reason: HOSPADM

## 2021-10-28 RX ORDER — INSULIN GLARGINE 100 [IU]/ML
0.2 INJECTION, SOLUTION SUBCUTANEOUS DAILY
Status: DISCONTINUED | OUTPATIENT
Start: 2021-10-28 | End: 2021-10-30

## 2021-10-28 RX ORDER — LISINOPRIL 5 MG/1
10 TABLET ORAL 2 TIMES DAILY
Status: DISCONTINUED | OUTPATIENT
Start: 2021-10-28 | End: 2021-10-31 | Stop reason: HOSPADM

## 2021-10-28 RX ORDER — ONDANSETRON 4 MG/1
4 TABLET, ORALLY DISINTEGRATING ORAL
Status: DISCONTINUED | OUTPATIENT
Start: 2021-10-28 | End: 2021-10-31 | Stop reason: HOSPADM

## 2021-10-28 RX ORDER — LEVOTHYROXINE SODIUM 112 UG/1
112 TABLET ORAL
Status: DISCONTINUED | OUTPATIENT
Start: 2021-10-28 | End: 2021-10-31 | Stop reason: HOSPADM

## 2021-10-28 RX ORDER — BUSPIRONE HYDROCHLORIDE 5 MG/1
15 TABLET ORAL 2 TIMES DAILY
Status: DISCONTINUED | OUTPATIENT
Start: 2021-10-28 | End: 2021-10-31 | Stop reason: HOSPADM

## 2021-10-28 RX ORDER — DEXTROSE 50 % IN WATER (D50W) INTRAVENOUS SYRINGE
25-50 AS NEEDED
Status: DISCONTINUED | OUTPATIENT
Start: 2021-10-28 | End: 2021-10-30 | Stop reason: SDUPTHER

## 2021-10-28 RX ORDER — SODIUM CHLORIDE 0.9 % (FLUSH) 0.9 %
5-40 SYRINGE (ML) INJECTION EVERY 8 HOURS
Status: DISCONTINUED | OUTPATIENT
Start: 2021-10-28 | End: 2021-10-30 | Stop reason: SDUPTHER

## 2021-10-28 RX ORDER — POLYETHYLENE GLYCOL 3350 17 G/17G
17 POWDER, FOR SOLUTION ORAL DAILY PRN
Status: DISCONTINUED | OUTPATIENT
Start: 2021-10-28 | End: 2021-10-31 | Stop reason: HOSPADM

## 2021-10-28 RX ORDER — INSULIN LISPRO 100 [IU]/ML
INJECTION, SOLUTION INTRAVENOUS; SUBCUTANEOUS
Status: DISCONTINUED | OUTPATIENT
Start: 2021-10-28 | End: 2021-10-29

## 2021-10-28 RX ADMIN — SODIUM CHLORIDE 10 ML: 9 INJECTION, SOLUTION INTRAMUSCULAR; INTRAVENOUS; SUBCUTANEOUS at 06:53

## 2021-10-28 RX ADMIN — BUSPIRONE HYDROCHLORIDE 15 MG: 5 TABLET ORAL at 17:23

## 2021-10-28 RX ADMIN — INSULIN LISPRO 12 UNITS: 100 INJECTION, SOLUTION INTRAVENOUS; SUBCUTANEOUS at 19:08

## 2021-10-28 RX ADMIN — INSULIN LISPRO 7 UNITS: 100 INJECTION, SOLUTION INTRAVENOUS; SUBCUTANEOUS at 12:15

## 2021-10-28 RX ADMIN — LISINOPRIL 10 MG: 10 TABLET ORAL at 17:23

## 2021-10-28 RX ADMIN — LISINOPRIL 10 MG: 10 TABLET ORAL at 12:17

## 2021-10-28 RX ADMIN — SODIUM CHLORIDE 10 ML: 9 INJECTION, SOLUTION INTRAMUSCULAR; INTRAVENOUS; SUBCUTANEOUS at 17:24

## 2021-10-28 RX ADMIN — ONDANSETRON 4 MG: 2 INJECTION INTRAMUSCULAR; INTRAVENOUS at 00:01

## 2021-10-28 RX ADMIN — INSULIN GLARGINE 24 UNITS: 100 INJECTION, SOLUTION SUBCUTANEOUS at 12:15

## 2021-10-28 RX ADMIN — IOPAMIDOL 100 ML: 755 INJECTION, SOLUTION INTRAVENOUS at 00:26

## 2021-10-28 RX ADMIN — ACETAMINOPHEN 650 MG: 325 TABLET ORAL at 17:16

## 2021-10-28 RX ADMIN — SODIUM CHLORIDE 1000 ML: 9 INJECTION, SOLUTION INTRAVENOUS at 00:05

## 2021-10-28 RX ADMIN — LEVOTHYROXINE SODIUM 112 MCG: 0.11 TABLET ORAL at 12:17

## 2021-10-28 RX ADMIN — BUSPIRONE HYDROCHLORIDE 15 MG: 5 TABLET ORAL at 12:27

## 2021-10-28 RX ADMIN — INSULIN GLARGINE 24 UNITS: 100 INJECTION, SOLUTION SUBCUTANEOUS at 17:24

## 2021-10-28 RX ADMIN — ASPIRIN 81 MG: 81 TABLET, COATED ORAL at 12:17

## 2021-10-28 RX ADMIN — ENOXAPARIN SODIUM 40 MG: 100 INJECTION SUBCUTANEOUS at 12:15

## 2021-10-28 NOTE — ED NOTES
Patient states he was wrestling with his girlfriend tonight around 10pm and he felt acute SOB, sweating, and  N/V/D. He states he hasn't experienced this before. Patient is A&Ox4. He states he has 8 heart stents from MI in 2019.

## 2021-10-28 NOTE — PROGRESS NOTES
Problem: Diabetes Self-Management  Goal: *Disease process and treatment process  Description: Define diabetes and identify own type of diabetes; list 3 options for treating diabetes. Outcome: Progressing Towards Goal  Goal: *Incorporating nutritional management into lifestyle  Description: Describe effect of type, amount and timing of food on blood glucose; list 3 methods for planning meals. Outcome: Progressing Towards Goal  Goal: *Incorporating physical activity into lifestyle  Description: State effect of exercise on blood glucose levels. Outcome: Progressing Towards Goal  Goal: *Developing strategies to promote health/change behavior  Description: Define the ABC's of diabetes; identify appropriate screenings, schedule and personal plan for screenings. Outcome: Progressing Towards Goal  Goal: *Using medications safely  Description: State effect of diabetes medications on diabetes; name diabetes medication taking, action and side effects. Outcome: Progressing Towards Goal  Goal: *Monitoring blood glucose, interpreting and using results  Description: Identify recommended blood glucose targets  and personal targets. Outcome: Progressing Towards Goal  Goal: *Prevention, detection, treatment of acute complications  Description: List symptoms of hyper- and hypoglycemia; describe how to treat low blood sugar and actions for lowering  high blood glucose level. Outcome: Progressing Towards Goal  Goal: *Prevention, detection and treatment of chronic complications  Description: Define the natural course of diabetes and describe the relationship of blood glucose levels to long term complications of diabetes.   Outcome: Progressing Towards Goal  Goal: *Developing strategies to address psychosocial issues  Description: Describe feelings about living with diabetes; identify support needed and support network  Outcome: Progressing Towards Goal     Problem: Patient Education: Go to Patient Education Activity  Goal: Patient/Family Education  Outcome: Progressing Towards Goal     Problem: Hypertension  Goal: *Blood pressure within specified parameters  Outcome: Progressing Towards Goal  Goal: *Fluid volume balance  Outcome: Progressing Towards Goal  Goal: *Labs within defined limits  Outcome: Progressing Towards Goal     Problem: Patient Education: Go to Patient Education Activity  Goal: Patient/Family Education  Outcome: Progressing Towards Goal

## 2021-10-28 NOTE — H&P
Hospitalist Admission Note    NAME: Jacek Campa   :  1980   MRN:  [id number]     Date/Time:  10/28/2021 7:13 AM    Patient PCP: Jeanine Lyons MD  ______________________________________________________________________  Given the patient's current clinical presentation, I have a high level of concern for decompensation if discharged from the emergency department. Complex decision making was performed, which includes reviewing the patient's available past medical records, laboratory results, and x-ray films. My assessment of this patient's clinical condition and my plan of care is as follows. Assessment / Plan:  Dizziness, abdominal pain  Concern for ACS  Uncontrolled diabetes mellitus  Hypothyroidism  Hypertension  Tobacco use  WALLY    -Admit to observation on telemetry  -High-sensitivity troponin negative x2 but uptrending with convincing story for ACS  -Became diaphoretic while \"wrestling\" with his wife earlier this evening also noting dizziness  -Symptoms resolved with rest  -Cardiology consult  -Trend troponin  -Continue aspirin and statin  -Continue Synthroid for hypothyroidism  -Keep n.p.o. for now  -Counseled on tobacco cessation, admits smoking about a pack per day recently and as above also noted to have uncontrolled diabetes and was counseled on the cardiac risk this poses  -Nicotine patch  -Continue CPAP nightly for his sleep apnea      Anxiety/depression  -Continue buspirone      Code Status: Full  Surrogate Decision Maker: Francheska Baker, mother: [phone]    DVT Prophylaxis: Lovenox  GI Prophylaxis: not indicated      Subjective:   CHIEF COMPLAINT: Dizziness, diaphoresis    HISTORY OF PRESENT ILLNESS:     Akash Horn is a 42 y.o.   male with past medical history of poorly controlled diabetes, hypothyroidism, hypertension, hyperlipidemia, GERD, tobacco use, and anxiety/depression who presents to the ED after experiencing a spell of dizziness and diaphoresis while wrestling with his girlfriend earlier this evening. Symptoms lasted for minutes and resolved with rest.  Denies chest pain but does admit some chest discomfort as well as some nausea. Patient did \"feel hot\" during this episode but otherwise denies fevers or chills. It has been a few days since his last BM which is a little unusual for him. Patient denies EtOH intake or recreational drug use. We were asked to admit for work up and evaluation of the above problems.      Past Medical History:   Diagnosis Date    Anxiety and depression     Bleeding of eye, left     21 pt reports receiving injections in right eye for beeding    Chronic headaches     COVID-19 2020    Diabetes ([facility]     GERD (gastroesophageal reflux disease)     Hypercholesterolemia     Hypertension     Hypothyroidism     MI (myocardial infarction) ([facility]     as of 21 pt reports 8 stents total    WALLY on CPAP         Past Surgical History:   Procedure Laterality Date    HX CARPAL TUNNEL RELEASE Left     HX CARPAL TUNNEL RELEASE Right     CTE trigger thumb and index finger    HX HEART CATHETERIZATION      HX LAP CHOLECYSTECTOMY  14    Dr Awais Benito    HX WISDOM TEETH EXTRACTION         Social History     Tobacco Use    Smoking status: Former Smoker     Packs/day: 0.00     Years: 14.00     Pack years: 0.00     Quit date: 2014     Years since quittin.8    Smokeless tobacco: Never Used   Substance Use Topics    Alcohol use: No        Family History   Problem Relation Age of Onset    Diabetes Mother     Heart Disease Father     Cancer Father     Diabetes Father     Diabetes Paternal Aunt     Diabetes Maternal Grandmother     Thyroid Cancer Maternal Grandmother     Kidney Disease Maternal Grandmother     Arthritis Maternal Grandmother     Heart Disease Maternal Grandfather     High Cholesterol Maternal Grandfather     Hypertension Maternal Grandfather     Diabetes Paternal Grandmother     Hemophilia Paternal Grandfather      Allergies   Allergen Reactions    Morphine Nausea and Vomiting    Penicillin G Swelling    Percocet [Oxycodone-Acetaminophen] Hives and Nausea and Vomiting    Sulfa (Sulfonamide Antibiotics) Swelling    Tramadol Nausea Only     Nausea and headache          Prior to Admission medications    Medication Sig Start Date End Date Taking? Authorizing Provider   prednisoLONE acetate (PRED FORTE) 1 % ophthalmic suspension  10/12/21   Provider, Historical   ofloxacin (FLOXIN) 0.3 % ophthalmic solution  10/12/21   Provider, Historical   busPIRone (BUSPAR) 15 mg tablet Take 1 tablet by mouth twice daily 10/14/21   Norma BREWER MD   sertraline (ZOLOFT) 100 mg tablet Take 1 tablet by mouth once daily 10/1/21   Norma BREWER MD   metFORMIN (GLUCOPHAGE) 500 mg tablet Take 1 Tablet by mouth two (2) times daily (with meals). 10/1/21   Cj Haynes MD   insulin regular (HumuLIN R Regular U-100 Insuln) 100 unit/mL injection 30 units with breakfast,30 units with lunch and  30 units with dinner plus correction. 160 units per day max  Patient taking differently: 20 units with breakfast, 20 units with lunch and  20 units with dinner plus correction. 160 units per day max 9/29/21   Cj Haynes MD   insulin glargine (Lantus U-100 Insulin) 100 unit/mL injection INJECT 95 UNITS SUBCUTANEOUSLY IN THE MORNING AND 95 UNITS AT NIGHT  Patient taking differently: INJECT 90 UNITS SUBCUTANEOUSLY IN THE MORNING AND 90 UNITS AT NIGHT 9/1/21   Cj Haynes MD   lisinopriL (PRINIVIL, ZESTRIL) 20 mg tablet Take 10 mg by mouth two (2) times a day.     Provider, Historical   levothyroxine (SYNTHROID) 112 mcg tablet TAKE 1 TABLET BY MOUTH ONCE DAILY BEFORE BREAKFAST 7/27/21   Cj Haynes MD   testosterone cypionate (DEPOTESTOTERONE CYPIONATE) 200 mg/mL injection INJECT 1/2 ML (100 MG) INTRAMUSCULARLY EVERY WEEK  Patient not taking: Reported on 10/18/2021 6/23/21 Oliver Romero MD   flash glucose sensor (FreeStyle Hernandez 2 Sensor) kit Change sensor every 14 days 3/26/21   Oliver Romero MD   flash glucose scanning reader [facility] [place]) misc Change sensor every 14 days 3/26/21   Oliver Romero MD   cholecalciferol (Vitamin D3) (5000 Units/125 mcg) tab tablet Take 1 Tab by mouth daily. 3/10/21   Domah, Herma Severs, MD   Insulin Syringe-Needle U-100 (BD Insulin Syringe) 1 mL 25 x 1\" syrg Use for drawing up/injecting testosterone once weekly. 12/7/20   Oliver Romero MD   Syringe with Needle, Disp, 1 mL 25 gauge x 5/8\" syrg Use with testosterone once every week 12/4/20   Oliver Romero MD   rosuvastatin (CRESTOR) 20 mg tablet Take 1 tablet by mouth nightly 10/2/20   Domah, Herma Severs, MD   LORazepam (ATIVAN) 1 mg tablet Take 1 Tab by mouth every four (4) hours as needed for Anxiety. Max Daily Amount: 6 mg. 5/8/20   Myra Claude, MD   aluminum & magnesium hydroxide-simethicone (Maalox Maximum Strength) 400-400-40 mg/5 mL suspension Take 10 mL by mouth every six (6) hours as needed for Indigestion. Provider, Historical   Omeprazole delayed release (PRILOSEC D/R) 20 mg tablet Take 1 Tab by mouth daily. 2/14/20   Jayme Warner, DO   clopidogrel (PLAVIX) 75 mg tab TAKE 1 TABLET BY MOUTH ONCE DAILY 12/23/19   Provider, Historical   nitroglycerin (NITROSTAT) 0.4 mg SL tablet Take 1 Tab by mouth every five (5) minutes as needed for Chest Pain. Sit down then put one tab under the tongue every 5 minutes as needed 12/4/19   Catalino Martin MD   metoprolol succinate (TOPROL-XL) 25 mg XL tablet Take 1 Tab by mouth daily. Patient taking differently: Take 25 mg by mouth two (2) times a day. 12/3/19   Jacob Aguilar MD   aspirin delayed-release 81 mg tablet Take 1 Tab by mouth daily. 9/24/19   Regine Verduzco MD       REVIEW OF SYSTEMS:     I am not able to complete the review of systems because:    The patient is intubated and sedated    The patient has altered mental status due to his acute medical problems    The patient has baseline aphasia from prior stroke(s)    The patient has baseline dementia and is not reliable historian    The patient is in acute medical distress and unable to provide information           Total of 12 systems reviewed as follows:       POSITIVE= underlined text  Negative = text not underlined  General:  fever, chills, sweats, generalized weakness, weight loss/gain,      loss of appetite   Eyes:    blurred vision, eye pain, loss of vision, double vision  ENT:    rhinorrhea, pharyngitis   Respiratory:   cough, sputum production, SOB, COLE, wheezing, pleuritic pain   Cardiology:   chest pain, palpitations, orthopnea, PND, edema, syncope   Gastrointestinal:  abdominal pain , N/V, diarrhea, dysphagia, constipation, bleeding   Genitourinary:  frequency, urgency, dysuria, hematuria, incontinence   Muskuloskeletal :  arthralgia, myalgia, back pain  Hematology:  easy bruising, nose or gum bleeding, lymphadenopathy   Dermatological: rash, ulceration, pruritis, color change / jaundice  Endocrine:   hot flashes or polydipsia   Neurological:  headache, dizziness, confusion, focal weakness, paresthesia,     Speech difficulties, memory loss, gait difficulty  Psychological: Feelings of anxiety, depression, agitation    Objective:   VITALS:    Visit Vitals  BP (!) 152/86 (BP 1 Location: Right upper arm, BP Patient Position: At rest;Lying)   Pulse 75   Temp 97.8 °F (36.6 °C)   Resp 12   Ht 5' 9\" (1.753 m)   Wt 117.9 kg (260 lb)   SpO2 97%   BMI 38.40 kg/m²       PHYSICAL EXAM:    General:    Alert, cooperative, no distress, appears stated age. HEENT: Atraumatic, anicteric sclerae, pink conjunctivae     No oral ulcers, mucosa moist, throat clear, dentition fair  Neck:  Supple, symmetrical,  thyroid: non tender  Lungs:   Mildly diminished air entry b/l/ fields. No Wheezing or Rhonchi. No rales.   Chest wall:  No tenderness  No Accessory muscle use.  Heart:   Regular  rhythm,  No  murmur   No edema  Abdomen:   Soft, non-tender. Not distended. Bowel sounds normal  Extremities: No cyanosis. No clubbing,      Skin turgor normal, Capillary refill normal, Radial dial pulse 2+  Skin:     Not pale. Not Jaundiced  No rashes   Psych:  Not anxious or agitated. Neurologic: EOMs intact. No facial asymmetry. No aphasia or slurred speech. Symmetrical strength, Sensation grossly intact. Alert and oriented X 4.     _______________________________________________________________________  Care Plan discussed with:    Comments   Patient x    Family      RN x    Care Manager                    Consultant:      _______________________________________________________________________  Expected  Disposition:   Home with Family x   HH/PT/OT/RN    SNF/LTC    ZEUS    ________________________________________________________________________  TOTAL TIME:  72 Minutes    Critical Care Provided     Minutes non procedure based      Comments    x Reviewed previous records   >50% of visit spent in counseling and coordination of care x Discussion with patient and/or family and questions answered       ________________________________________________________________________  Signed: Skip Sol, DO    Procedures: see electronic medical records for all procedures/Xrays and details which were not copied into this note but were reviewed prior to creation of Plan.     LAB DATA REVIEWED:    Recent Results (from the past 24 hour(s))   EKG, 12 LEAD, INITIAL    Collection Time: 10/27/21 11:37 PM   Result Value Ref Range    Ventricular Rate 87 BPM    Atrial Rate 87 BPM    P-R Interval 158 ms    QRS Duration 88 ms    Q-T Interval 334 ms    QTC Calculation (Bezet) 401 ms    Calculated P Axis 47 degrees    Calculated R Axis 6 degrees    Calculated T Axis 108 degrees    Diagnosis       Normal sinus rhythm  Left ventricular hypertrophy with repolarization abnormality  When compared with ECG of 19-AUG-2021 20:14,  No significant change was found     CBC WITH AUTOMATED DIFF    Collection Time: 10/27/21 11:45 PM   Result Value Ref Range    WBC 8.0 4.1 - 11.1 K/uL    RBC 4.57 4.10 - 5.70 M/uL    HGB 13.1 12.1 - 17.0 g/dL    HCT 38.2 36.6 - 50.3 %    MCV 83.6 80.0 - 99.0 FL    MCH 28.7 26.0 - 34.0 PG    MCHC 34.3 30.0 - 36.5 g/dL    RDW 13.9 11.5 - 14.5 %    PLATELET 112 663 - 424 K/uL    MPV 10.0 8.9 - 12.9 FL    NRBC 0.0 0  WBC    ABSOLUTE NRBC 0.00 0.00 - 0.01 K/uL    NEUTROPHILS 59 32 - 75 %    BAND NEUTROPHILS 1 %    LYMPHOCYTES 28 12 - 49 %    MONOCYTES 5 5 - 13 %    EOSINOPHILS 2 0 - 7 %    BASOPHILS 0 0 - 1 %    METAMYELOCYTES 5 %    IMMATURE GRANULOCYTES 0 0.0 - 0.5 %    ABS. NEUTROPHILS 4.8 1.8 - 8.0 K/UL    ABS. LYMPHOCYTES 2.2 0.8 - 3.5 K/UL    ABS. MONOCYTES 0.4 0.0 - 1.0 K/UL    ABS. EOSINOPHILS 0.2 0.0 - 0.4 K/UL    ABS. BASOPHILS 0.0 0.0 - 0.1 K/UL    ABS. IMM. GRANS. 0.0 0.00 - 0.04 K/UL    DF MANUAL      RBC COMMENTS NORMOCYTIC, NORMOCHROMIC     METABOLIC PANEL, COMPREHENSIVE    Collection Time: 10/27/21 11:45 PM   Result Value Ref Range    Sodium 131 (L) 136 - 145 mmol/L    Potassium 4.1 3.5 - 5.1 mmol/L    Chloride 99 97 - 108 mmol/L    CO2 26 21 - 32 mmol/L    Anion gap 6 5 - 15 mmol/L    Glucose 368 (H) 65 - 100 mg/dL    BUN 16 6 - 20 MG/DL    Creatinine 1.26 0.70 - 1.30 MG/DL    BUN/Creatinine ratio 13 12 - 20      GFR est AA >60 >60 ml/min/1.73m2    GFR est non-AA >60 >60 ml/min/1.73m2    Calcium 9.8 8.5 - 10.1 MG/DL    Bilirubin, total 0.4 0.2 - 1.0 MG/DL    ALT (SGPT) 59 12 - 78 U/L    AST (SGOT) 21 15 - 37 U/L    Alk.  phosphatase 97 45 - 117 U/L    Protein, total 7.1 6.4 - 8.2 g/dL    Albumin 3.5 3.5 - 5.0 g/dL    Globulin 3.6 2.0 - 4.0 g/dL    A-G Ratio 1.0 (L) 1.1 - 2.2     CK W/ REFLX CKMB    Collection Time: 10/27/21 11:45 PM   Result Value Ref Range     39 - 308 U/L   NT-PRO BNP    Collection Time: 10/27/21 11:45 PM   Result Value Ref Range    NT pro-BNP 72 <125 PG/ML   SAMPLES BEING HELD    Collection Time: 10/27/21 11:45 PM   Result Value Ref Range    SAMPLES BEING HELD BL     COMMENT        Add-on orders for these samples will be processed based on acceptable specimen integrity and analyte stability, which may vary by analyte.    D DIMER    Collection Time: 10/27/21 11:45 PM   Result Value Ref Range    D-dimer 0.20 0.00 - 0.65 mg/L FEU   TROPONIN-HIGH SENSITIVITY    Collection Time: 10/27/21 11:48 PM   Result Value Ref Range    Troponin-High Sensitivity 24 0 - 76 ng/L   TROPONIN-HIGH SENSITIVITY    Collection Time: 10/28/21  1:53 AM   Result Value Ref Range    Troponin-High Sensitivity 63 0 - 76 ng/L   TROPONIN-HIGH SENSITIVITY    Collection Time: 10/28/21  5:49 AM   Result Value Ref Range    Troponin-High Sensitivity 69 0 - 76 ng/L

## 2021-10-28 NOTE — PROGRESS NOTES
Patient seen and evaluated in his room. [For details see H&P done by my colleague earlier today]    He complains of no dizziness, chest pain. He has some vague belly pain today. Labs reviewed, CT abdomen reviewed. Cardiology following, likely medical management only for now.   Possible discharge in a.m. if symptoms better

## 2021-10-28 NOTE — H&P
Patient is a 48-year-old adult  male with past medical history of CAD who underwent PCI in 2019, requiring multiple PCI procedures with placement of 8 stents. Patient states he was \"wrestling\" with his girlfriend earlier this evening around 10 PM and became diaphoretic and dizzy. He denies palpitations or chest pain per se. The patient has diabetes, which is poorly controlled. Reports he recently has been taking Lantus 90 units twice daily as well as 30 units of short acting mealtime insulin 3 times a day with meals. He admits that his blood sugars have been intermittently into the 300s. EKG in the ED shows NSR and high sensitivity troponin is normal x 2. Patient has has no further episodes of diaphoresis/dizziness or any chest discomfort in the ED.      Full note to follow

## 2021-10-28 NOTE — ED NOTES
[facility]. She didn't have any questions at this time.

## 2021-10-28 NOTE — ED PROVIDER NOTES
EMERGENCY DEPARTMENT HISTORY AND PHYSICAL EXAM     ----------------------------------------------------------------------------  Please note that this dictation was completed with 3Jam, the computer voice recognition software. Quite often unanticipated grammatical, syntax, homophones, and other interpretive errors are inadvertently transcribed by the computer software. Please disregard these errors. Please excuse any errors that have escaped final proofreading  ----------------------------------------------------------------------------      Date: 10/27/2021  Patient Name: Leopoldo Halter    History of Presenting Illness     Chief Complaint   Patient presents with    Shortness of Breath     pt presents w/ c/o rapid on set of SOB, and feeling like he is going to pass out. pt reports no BM since saturday        History Provided By:  Patient    HPI: Leopoldo Halter is a 42 y.o. male, with significant pmhx of CAD, anxiety poorly controlled diabetes, reflux, cholesterol, sleep apnea who presents via private vehicle to the ED with c/o sudden onset of dizziness, diaphoresis and shortness of breath that started while \"horsing around\" with his girlfriend. Patient reports having episode of nausea with dry heaving without actual vomiting. Notes that he has not had a bowel movement in 4 days which is abnormal for him and is currently followed by GI at [facility] for ongoing abdominal issues. Specifically denies chest pain with associated shortness of breath. Patient also specifically denies any associated fevers, chills,  diarrhea, abd pain,  urinary sxs, or headache. Social Hx: denies tobacco  denies EtOH , denies recreational/Illicit Drugs    There are no other complaints, changes, or physical findings at this time.      PCP: Ankur Cummings MD    Allergies   Allergen Reactions    Morphine Nausea and Vomiting    Penicillin G Swelling    Percocet [Oxycodone-Acetaminophen] Hives and Nausea and Vomiting    Sulfa (Sulfonamide Antibiotics) Swelling    Tramadol Nausea Only     Nausea and headache         Current Outpatient Medications   Medication Sig Dispense Refill    prednisoLONE acetate (PRED FORTE) 1 % ophthalmic suspension       ofloxacin (FLOXIN) 0.3 % ophthalmic solution       busPIRone (BUSPAR) 15 mg tablet Take 1 tablet by mouth twice daily 60 Tablet 5    sertraline (ZOLOFT) 100 mg tablet Take 1 tablet by mouth once daily 30 Tablet 0    metFORMIN (GLUCOPHAGE) 500 mg tablet Take 1 Tablet by mouth two (2) times daily (with meals). 180 Tablet 3    insulin regular (HumuLIN R Regular U-100 Insuln) 100 unit/mL injection 30 units with breakfast,30 units with lunch and  30 units with dinner plus correction. 160 units per day max (Patient taking differently: 20 units with breakfast, 20 units with lunch and  20 units with dinner plus correction. 160 units per day max) 50 mL 3    insulin glargine (Lantus U-100 Insulin) 100 unit/mL injection INJECT 95 UNITS SUBCUTANEOUSLY IN THE MORNING AND 95 UNITS AT NIGHT (Patient taking differently: INJECT 90 UNITS SUBCUTANEOUSLY IN THE MORNING AND 90 UNITS AT NIGHT) 60 mL 1    lisinopriL (PRINIVIL, ZESTRIL) 20 mg tablet Take 10 mg by mouth two (2) times a day.  levothyroxine (SYNTHROID) 112 mcg tablet TAKE 1 TABLET BY MOUTH ONCE DAILY BEFORE BREAKFAST 90 Tablet 2    testosterone cypionate (DEPOTESTOTERONE CYPIONATE) 200 mg/mL injection INJECT 1/2 ML (100 MG) INTRAMUSCULARLY EVERY WEEK (Patient not taking: Reported on 10/18/2021) 2 mL 4    flash glucose sensor (FreeStyle Mikal 2 Sensor) kit Change sensor every 14 days 2 Kit 3    flash glucose scanning reader (FreeStyle Mikal 2 [place]) misc Change sensor every 14 days 1 Each 0    cholecalciferol (Vitamin D3) (5000 Units/125 mcg) tab tablet Take 1 Tab by mouth daily. 90 Tab 1    Insulin Syringe-Needle U-100 (BD Insulin Syringe) 1 mL 25 x 1\" syrg Use for drawing up/injecting testosterone once weekly.  100 Each 3    Syringe with Needle, Disp, 1 mL 25 gauge x 5/8\" syrg Use with testosterone once every week 50 Syringe 3    rosuvastatin (CRESTOR) 20 mg tablet Take 1 tablet by mouth nightly 30 Tab 0    LORazepam (ATIVAN) 1 mg tablet Take 1 Tab by mouth every four (4) hours as needed for Anxiety. Max Daily Amount: 6 mg. 30 Tab 5    aluminum & magnesium hydroxide-simethicone (Maalox Maximum Strength) 400-400-40 mg/5 mL suspension Take 10 mL by mouth every six (6) hours as needed for Indigestion.  Omeprazole delayed release (PRILOSEC D/R) 20 mg tablet Take 1 Tab by mouth daily. 20 Tab 0    clopidogrel (PLAVIX) 75 mg tab TAKE 1 TABLET BY MOUTH ONCE DAILY      nitroglycerin (NITROSTAT) 0.4 mg SL tablet Take 1 Tab by mouth every five (5) minutes as needed for Chest Pain. Sit down then put one tab under the tongue every 5 minutes as needed 1 Bottle 0    metoprolol succinate (TOPROL-XL) 25 mg XL tablet Take 1 Tab by mouth daily. (Patient taking differently: Take 25 mg by mouth two (2) times a day.) 90 Tab 3    aspirin delayed-release 81 mg tablet Take 1 Tab by mouth daily.  27 Tab 0       Past History     Past Medical History:  Past Medical History:   Diagnosis Date    Anxiety and depression     Bleeding of eye, left     8/17/21 pt reports receiving injections in right eye for beeding    Chronic headaches 2007    COVID-19 12/20/2020    Diabetes ([address]     GERD (gastroesophageal reflux disease)     Hypercholesterolemia     Hypertension     Hypothyroidism     MI (myocardial infarction) ([address]     as of 8/17/21 pt reports 8 stents total    WALLY on CPAP        Past Surgical History:  Past Surgical History:   Procedure Laterality Date    HX CARPAL TUNNEL RELEASE Left 2012    HX CARPAL TUNNEL RELEASE Right 2018    CTE trigger thumb and index finger    HX HEART CATHETERIZATION      HX LAP CHOLECYSTECTOMY  8/26/14    Dr Chritsie White    HX WISDOM TEETH EXTRACTION         Family History:  Family History   Problem Relation Age of Onset  Diabetes Mother     Heart Disease Father     Cancer Father     Diabetes Father     Diabetes Paternal Aunt     Diabetes Maternal Grandmother     Thyroid Cancer Maternal Grandmother     Kidney Disease Maternal Grandmother     Arthritis Maternal Grandmother     Heart Disease Maternal Grandfather     High Cholesterol Maternal Grandfather     Hypertension Maternal Grandfather     Diabetes Paternal Grandmother     Hemophilia Paternal Grandfather        Social History:  Social History     Tobacco Use    Smoking status: Former Smoker     Packs/day: 0.00     Years: 14.00     Pack years: 0.00     Quit date: 2014     Years since quittin.8    Smokeless tobacco: Never Used   Substance Use Topics    Alcohol use: No    Drug use: No       Allergies: Allergies   Allergen Reactions    Morphine Nausea and Vomiting    Penicillin G Swelling    Percocet [Oxycodone-Acetaminophen] Hives and Nausea and Vomiting    Sulfa (Sulfonamide Antibiotics) Swelling    Tramadol Nausea Only     Nausea and headache           Review of Systems   Review of Systems   Constitutional: Positive for diaphoresis. Negative for chills and fever. HENT: Negative. Eyes: Negative. Respiratory: Positive for shortness of breath. Negative for cough and chest tightness. Cardiovascular: Negative for chest pain and leg swelling. Gastrointestinal: Positive for nausea. Negative for abdominal pain, diarrhea and vomiting. Endocrine: Negative. Genitourinary: Negative for difficulty urinating and dysuria. Musculoskeletal: Negative for myalgias. Skin: Negative. Neurological: Positive for dizziness and light-headedness. Negative for syncope. Psychiatric/Behavioral: Negative. All other systems reviewed and are negative. Physical Exam   Physical Exam  Vitals and nursing note reviewed. Constitutional:       General: He is not in acute distress. Appearance: He is well-developed. He is obese.  He is not diaphoretic. HENT:      Head: Normocephalic and atraumatic. Nose: Nose normal.      Mouth/Throat:      Pharynx: No oropharyngeal exudate. Eyes:      Conjunctiva/sclera: Conjunctivae normal.      Pupils: Pupils are equal, round, and reactive to light. Neck:      Vascular: No JVD. Cardiovascular:      Rate and Rhythm: Normal rate and regular rhythm. Heart sounds: Normal heart sounds. No murmur heard. No friction rub. Pulmonary:      Effort: Pulmonary effort is normal. No respiratory distress. Breath sounds: Normal breath sounds. No stridor. No wheezing or rales. Abdominal:      General: Bowel sounds are normal. There is no distension. Palpations: Abdomen is soft. Tenderness: There is no abdominal tenderness. There is no rebound. Musculoskeletal:         General: No tenderness. Normal range of motion. Cervical back: Normal range of motion and neck supple. Skin:     General: Skin is warm and dry. Findings: No rash. Neurological:      General: No focal deficit present. Mental Status: He is alert and oriented to person, place, and time. Cranial Nerves: No cranial nerve deficit. Psychiatric:         Speech: Speech normal.         Behavior: Behavior normal.         Thought Content:  Thought content normal.         Judgment: Judgment normal.           Diagnostic Study Results     Labs -     Recent Results (from the past 12 hour(s))   EKG, 12 LEAD, INITIAL    Collection Time: 10/27/21 11:37 PM   Result Value Ref Range    Ventricular Rate 87 BPM    Atrial Rate 87 BPM    P-R Interval 158 ms    QRS Duration 88 ms    Q-T Interval 334 ms    QTC Calculation (Bezet) 401 ms    Calculated P Axis 47 degrees    Calculated R Axis 6 degrees    Calculated T Axis 108 degrees    Diagnosis       Normal sinus rhythm  Left ventricular hypertrophy with repolarization abnormality  When compared with ECG of 19-AUG-2021 20:14,  No significant change was found     CBC WITH AUTOMATED DIFF    Collection Time: 10/27/21 11:45 PM   Result Value Ref Range    WBC 8.0 4.1 - 11.1 K/uL    RBC 4.57 4.10 - 5.70 M/uL    HGB 13.1 12.1 - 17.0 g/dL    HCT 38.2 36.6 - 50.3 %    MCV 83.6 80.0 - 99.0 FL    MCH 28.7 26.0 - 34.0 PG    MCHC 34.3 30.0 - 36.5 g/dL    RDW 13.9 11.5 - 14.5 %    PLATELET 661 496 - 096 K/uL    MPV 10.0 8.9 - 12.9 FL    NRBC 0.0 0  WBC    ABSOLUTE NRBC 0.00 0.00 - 0.01 K/uL    NEUTROPHILS 59 32 - 75 %    BAND NEUTROPHILS 1 %    LYMPHOCYTES 28 12 - 49 %    MONOCYTES 5 5 - 13 %    EOSINOPHILS 2 0 - 7 %    BASOPHILS 0 0 - 1 %    METAMYELOCYTES 5 %    IMMATURE GRANULOCYTES 0 0.0 - 0.5 %    ABS. NEUTROPHILS 4.8 1.8 - 8.0 K/UL    ABS. LYMPHOCYTES 2.2 0.8 - 3.5 K/UL    ABS. MONOCYTES 0.4 0.0 - 1.0 K/UL    ABS. EOSINOPHILS 0.2 0.0 - 0.4 K/UL    ABS. BASOPHILS 0.0 0.0 - 0.1 K/UL    ABS. IMM. GRANS. 0.0 0.00 - 0.04 K/UL    DF MANUAL      RBC COMMENTS NORMOCYTIC, NORMOCHROMIC     METABOLIC PANEL, COMPREHENSIVE    Collection Time: 10/27/21 11:45 PM   Result Value Ref Range    Sodium 131 (L) 136 - 145 mmol/L    Potassium 4.1 3.5 - 5.1 mmol/L    Chloride 99 97 - 108 mmol/L    CO2 26 21 - 32 mmol/L    Anion gap 6 5 - 15 mmol/L    Glucose 368 (H) 65 - 100 mg/dL    BUN 16 6 - 20 MG/DL    Creatinine 1.26 0.70 - 1.30 MG/DL    BUN/Creatinine ratio 13 12 - 20      GFR est AA >60 >60 ml/min/1.73m2    GFR est non-AA >60 >60 ml/min/1.73m2    Calcium 9.8 8.5 - 10.1 MG/DL    Bilirubin, total 0.4 0.2 - 1.0 MG/DL    ALT (SGPT) 59 12 - 78 U/L    AST (SGOT) 21 15 - 37 U/L    Alk.  phosphatase 97 45 - 117 U/L    Protein, total 7.1 6.4 - 8.2 g/dL    Albumin 3.5 3.5 - 5.0 g/dL    Globulin 3.6 2.0 - 4.0 g/dL    A-G Ratio 1.0 (L) 1.1 - 2.2     CK W/ REFLX CKMB    Collection Time: 10/27/21 11:45 PM   Result Value Ref Range     39 - 308 U/L   NT-PRO BNP    Collection Time: 10/27/21 11:45 PM   Result Value Ref Range    NT pro-BNP 72 <125 PG/ML   SAMPLES BEING HELD    Collection Time: 10/27/21 11:45 PM   Result Value Ref Range    SAMPLES BEING HELD BL     COMMENT        Add-on orders for these samples will be processed based on acceptable specimen integrity and analyte stability, which may vary by analyte. D DIMER    Collection Time: 10/27/21 11:45 PM   Result Value Ref Range    D-dimer 0.20 0.00 - 0.65 mg/L FEU   TROPONIN-HIGH SENSITIVITY    Collection Time: 10/27/21 11:48 PM   Result Value Ref Range    Troponin-High Sensitivity 24 0 - 76 ng/L   TROPONIN-HIGH SENSITIVITY    Collection Time: 10/28/21  1:53 AM   Result Value Ref Range    Troponin-High Sensitivity 63 0 - 76 ng/L       Radiologic Studies -   CT ABD PELV W CONT   Final Result   1. No acute intra-abdominal pathology      2.  17 is inflammation in the anterior abdominal wall with skin thickening. Question cellulitis      XR CHEST PORT   Final Result   Cardiomegaly. Diffuse interstitial opacities may represent pulmonary   edema versus viral pneumonia        CT Results  (Last 48 hours)               10/28/21 0026  CT ABD PELV W CONT Final result    Impression:  1. No acute intra-abdominal pathology       2.  17 is inflammation in the anterior abdominal wall with skin thickening. Question cellulitis       Narrative:  EXAM: CT ABD PELV W CONT       INDICATION: eval for obstruction       COMPARISON: 1/27/2021        CONTRAST: 100 mL of Isovue-370. TECHNIQUE:    Following the uneventful intravenous administration of contrast, thin axial   images were obtained through the abdomen and pelvis. Coronal and sagittal   reconstructions were generated. Oral contrast was not administered. CT dose   reduction was achieved through use of a standardized protocol tailored for this   examination and automatic exposure control for dose modulation. FINDINGS:    LOWER THORAX: No significant abnormality in the incidentally imaged lower chest.   LIVER: Mild hepatic steatosis. Small hypodensity in the left hepatic lobe too   small to characterize.    BILIARY TREE: Cholecystectomy CBD is not dilated. SPLEEN: within normal limits. PANCREAS: No mass or ductal dilatation. ADRENALS: Unremarkable. KIDNEYS: No mass, calculus, or hydronephrosis. STOMACH: Unremarkable. SMALL BOWEL: No dilatation or wall thickening. COLON: No dilatation or wall thickening. APPENDIX: Unremarkable   PERITONEUM: No ascites or pneumoperitoneum. RETROPERITONEUM: No lymphadenopathy or aortic aneurysm. REPRODUCTIVE ORGANS: Unremarkable   URINARY BLADDER: No mass or calculus. BONES: No destructive bone lesion. ABDOMINAL WALL: Subcutaneous soft tissue stranding and skin thickening in the   anterior abdominal wall. Question cellulitis   ADDITIONAL COMMENTS: N/A               CXR Results  (Last 48 hours)               10/28/21 0002  XR CHEST PORT Final result    Impression:  Cardiomegaly. Diffuse interstitial opacities may represent pulmonary   edema versus viral pneumonia       Narrative:  EXAM: XR CHEST PORT       INDICATION: Shortness of breath       COMPARISON: 12/21/2020       FINDINGS: A portable AP radiograph of the chest was obtained at 2350 hours. The   patient is on a cardiac monitor. Diffuse interstitial opacities are noted. Heart   size is enlarged. .  The bones and soft tissues are grossly within normal limits. Medical Decision Making   I am the first provider for this patient. I reviewed the vital signs, available nursing notes, past medical history, past surgical history, family history and social history. Vital Signs-Reviewed the patient's vital signs.   Patient Vitals for the past 12 hrs:   Temp Pulse Resp BP SpO2   10/27/21 2333 97.8 °F (36.6 °C) 86 18 (!) 165/80 98 %       Pulse Oximetry Analysis - 98% on RA, normal  Rate: 86 bpm  Rhythm: nsr      Provider Notes (Medical Decision Making):     DDX:  Arrhythmia, electrolyte abnormality, PE, bowel obstruction, panic attack    Plan:  EKG, labs, D-dimer, chest x-ray, CT scan, antiemetic, IV fluids    Impression:  Nstemi, acute chest pain    ED Course:   Initial assessment performed. The patients presenting problems have been discussed, and they are in agreement with the care plan formulated and outlined with them. I have encouraged them to ask questions as they arise throughout their visit. I reviewed the nursing notes and and vital signs from today's visit, as well as the electronic medical record system for any past medical records that were available that may contribute to the patients current condition, including noting previous primary care and endocrine visits for poorly controlled type 1 diabetes and anxiety    Nursing notes will be reviewed as they become available in realtime while the pt has been in the ED. Radha Olivas MD      HYPERTENSION COUNSELING:  Patient made aware of their elevated blood pressure and is instructed to follow up this week with their Primary Care or [address] [address] for a recheck (should they be discharged.) Patient is counseled regarding consequences of chronic, uncontrolled hypertension including kidney disease, heart disease, stroke or even death. Patient states their understanding    EKG interpretation 2337: NSR, nl Axis, rate 87; , QRS 88, QTc 401; NO STEMI; interpreted by Radha Olivas MD    I personally reviewed/interpreted pt's imaging. Agree with official read by radiology as noted above. Radha Olivas MD    PROGRESS NOTE:  3:09 AM   Pt with upper trending troponin, milligrams chest pain at time of reevaluation. Will admit for further work-up. Radha Olivas MD    CONSULT NOTE:   3:09 Rambo Kong MD spoke with Dr. Kitty Amador,   Specialty: Marlon Amador due to nstemi. Discussed pt's HPI and available diagnostic results thus far. Expressed concerns for needed admission. Consultant will evaluate for admission.   Radha Olivas MD    ADMISSION NOTE:  3:09 AM  Patient is being admitted to [facility] by Dr. Kati Plata. The results of their tests and reasons for their admission have been discussed with them and/or available family. They convey agreement and understanding for the need to be admitted and for their admission diagnosis. Dami Llamas MD             Critical Care Time:     none      Diagnosis     Clinical Impression:   1. NSTEMI (non-ST elevated myocardial infarction) ([address]        PLAN:  1.  Admit to hospitalist

## 2021-10-28 NOTE — PROGRESS NOTES
Pt known to me   3 vessel CAD with intervention in 2019 . PCI of RCA for ,   LAD and Cx arteries   Type 1 diabetes poorly controlled   Obesity   Hyperlipidemia     Atypical symptoms on presentation yesterday   No EKG changes   Troponin normal     No indication of ACS   Do not plan for cardiac cath unless there is a change in status . Full note to follow .

## 2021-10-28 NOTE — PROGRESS NOTES
Transition of Care Plan:    RUR: N/A - pt is observation status  Disposition: Home with family assistance  Follow up appointments: PCP, Cardiologist?  DME needed: Pt uses a Bipap machine at night  Transportation at Discharge: Pt's girlfriend  Joel August or means to access home: Pt's girlfriend has access        Medicare Letter: N/A  Observation Letter: Received on 10/28/2021  Is patient a BCPI-A Bundle:        No   If yes, was Bundle Letter given?:     Caregiver Contact: MotherEscobar ([phone])  Discharge Caregiver contacted prior to discharge? Pt's mother is currently admitted to [address] [place] [address]    Reason for Admission:  Chest Pain                     RUR Score:          N/A - pt is observation            Plan for utilizing home health:          PCP: First and Last name:  Ruddy Levy MD     Name of Practice: [facility] at [address] [place] [address]   Are you a current patient: Yes/No: Yes   Approximate date of last visit: 10/18/2021   Can you participate in a virtual visit with your PCP: Yes                    Current Advanced Directive/Advance Care Plan: Full Code      Healthcare Decision Maker:              Primary Decision Maker: Kwasi Butler Mother - [phone]    Secondary Decision Maker: Jojo Whaet - Girlfriend - [phone]                  Transition of Care Plan:       Home with family assistance             CM met with patient at the bedside to complete initial assessment. CM introduced role, verified demographics, and discussed discharge planning. Pt is a 43 yo male with an admitting diagnosis of Chest Pain. Pt lives with his mother in a 1 story home with 3 LOPEZ. Pt is independent at baseline. Pt uses a Bipap machine at night. Pt has no hx of HH, SNF, or IPR. Pt's daughter will transport him home at d/c. CM will continue to follow for discharge planning while patient is admitted on current unit. Please contact this CM with questions or issues related to discharge.      Care Management Interventions  PCP Verified by CM: Yes (Dr. Henry Gonzalez)  Last Visit to PCP: 10/18/21  Mode of Transport at Discharge:  Other (see comment) (Pt's girlfriend)  Transition of Care Consult (CM Consult): Discharge Planning  Discharge Durable Medical Equipment: No (Pt has a Bipap machine that he uses at night)  Physical Therapy Consult: No  Occupational Therapy Consult: No  Speech Therapy Consult: No  Support Systems: Parent(s), Spouse/Significant Other  Confirm Follow Up Transport: Self  Discharge Location  Discharge Placement: Home with family assistance    JOSE MIGUEL Eng  Care Manager [facility]  [phone]

## 2021-10-29 LAB
ACT BLD: 312 SECS (ref 79–138)
ANION GAP SERPL CALC-SCNC: 8 MMOL/L (ref 5–15)
ATRIAL RATE: 85 BPM
BUN SERPL-MCNC: 19 MG/DL (ref 6–20)
BUN/CREAT SERPL: 18 (ref 12–20)
CALCIUM SERPL-MCNC: 9.4 MG/DL (ref 8.5–10.1)
CALCULATED P AXIS, ECG09: 33 DEGREES
CALCULATED R AXIS, ECG10: -3 DEGREES
CALCULATED T AXIS, ECG11: 94 DEGREES
CHLORIDE SERPL-SCNC: 95 MMOL/L (ref 97–108)
CO2 SERPL-SCNC: 26 MMOL/L (ref 21–32)
CREAT SERPL-MCNC: 1.05 MG/DL (ref 0.7–1.3)
DIAGNOSIS, 93000: NORMAL
ERYTHROCYTE [DISTWIDTH] IN BLOOD BY AUTOMATED COUNT: 14.1 % (ref 11.5–14.5)
GLUCOSE BLD STRIP.AUTO-MCNC: 331 MG/DL (ref 65–117)
GLUCOSE BLD STRIP.AUTO-MCNC: 377 MG/DL (ref 65–117)
GLUCOSE BLD STRIP.AUTO-MCNC: 395 MG/DL (ref 65–117)
GLUCOSE BLD STRIP.AUTO-MCNC: 461 MG/DL (ref 65–117)
GLUCOSE SERPL-MCNC: 400 MG/DL (ref 65–100)
HCT VFR BLD AUTO: 40.6 % (ref 36.6–50.3)
HGB BLD-MCNC: 13.6 G/DL (ref 12.1–17)
MCH RBC QN AUTO: 28.2 PG (ref 26–34)
MCHC RBC AUTO-ENTMCNC: 33.5 G/DL (ref 30–36.5)
MCV RBC AUTO: 84.2 FL (ref 80–99)
NRBC # BLD: 0 K/UL (ref 0–0.01)
NRBC BLD-RTO: 0 PER 100 WBC
P-R INTERVAL, ECG05: 152 MS
PLATELET # BLD AUTO: 182 K/UL (ref 150–400)
PMV BLD AUTO: 10.3 FL (ref 8.9–12.9)
POTASSIUM SERPL-SCNC: 4.6 MMOL/L (ref 3.5–5.1)
Q-T INTERVAL, ECG07: 352 MS
QRS DURATION, ECG06: 90 MS
QTC CALCULATION (BEZET), ECG08: 418 MS
RBC # BLD AUTO: 4.82 M/UL (ref 4.1–5.7)
SERVICE CMNT-IMP: ABNORMAL
SODIUM SERPL-SCNC: 129 MMOL/L (ref 136–145)
TROPONIN-HIGH SENSITIVITY: 593 NG/L (ref 0–76)
VENTRICULAR RATE, ECG03: 85 BPM
WBC # BLD AUTO: 7.4 K/UL (ref 4.1–11.1)

## 2021-10-29 PROCEDURE — 77030015766: Performed by: INTERNAL MEDICINE

## 2021-10-29 PROCEDURE — C1769 GUIDE WIRE: HCPCS | Performed by: INTERNAL MEDICINE

## 2021-10-29 PROCEDURE — 84484 ASSAY OF TROPONIN QUANT: CPT

## 2021-10-29 PROCEDURE — 74011636637 HC RX REV CODE- 636/637: Performed by: NURSE PRACTITIONER

## 2021-10-29 PROCEDURE — 99153 MOD SED SAME PHYS/QHP EA: CPT | Performed by: INTERNAL MEDICINE

## 2021-10-29 PROCEDURE — 99218 HC RM OBSERVATION: CPT

## 2021-10-29 PROCEDURE — 85027 COMPLETE CBC AUTOMATED: CPT

## 2021-10-29 PROCEDURE — 74011000250 HC RX REV CODE- 250: Performed by: INTERNAL MEDICINE

## 2021-10-29 PROCEDURE — 85347 COAGULATION TIME ACTIVATED: CPT

## 2021-10-29 PROCEDURE — 77030010221 HC SPLNT WR POS TELE -B: Performed by: INTERNAL MEDICINE

## 2021-10-29 PROCEDURE — 77030030195 HC CATH ANGI DX PRF4 MRTM -A: Performed by: INTERNAL MEDICINE

## 2021-10-29 PROCEDURE — 74011250637 HC RX REV CODE- 250/637: Performed by: INTERNAL MEDICINE

## 2021-10-29 PROCEDURE — 74011250636 HC RX REV CODE- 250/636: Performed by: INTERNAL MEDICINE

## 2021-10-29 PROCEDURE — 93458 L HRT ARTERY/VENTRICLE ANGIO: CPT | Performed by: INTERNAL MEDICINE

## 2021-10-29 PROCEDURE — 82962 GLUCOSE BLOOD TEST: CPT

## 2021-10-29 PROCEDURE — 77030004549 HC CATH ANGI DX PRF MRTM -A: Performed by: INTERNAL MEDICINE

## 2021-10-29 PROCEDURE — 80048 BASIC METABOLIC PNL TOTAL CA: CPT

## 2021-10-29 PROCEDURE — 74011636637 HC RX REV CODE- 636/637: Performed by: STUDENT IN AN ORGANIZED HEALTH CARE EDUCATION/TRAINING PROGRAM

## 2021-10-29 PROCEDURE — 2709999900 HC NON-CHARGEABLE SUPPLY

## 2021-10-29 PROCEDURE — 77030019697 HC SYR ANGI INFL MRTM -B: Performed by: INTERNAL MEDICINE

## 2021-10-29 PROCEDURE — 36415 COLL VENOUS BLD VENIPUNCTURE: CPT

## 2021-10-29 PROCEDURE — 93005 ELECTROCARDIOGRAM TRACING: CPT

## 2021-10-29 PROCEDURE — 77030008543 HC TBNG MON PRSS MRTM -A: Performed by: INTERNAL MEDICINE

## 2021-10-29 PROCEDURE — C1887 CATHETER, GUIDING: HCPCS | Performed by: INTERNAL MEDICINE

## 2021-10-29 PROCEDURE — 77030019698 HC SYR ANGI MDLON MRTM -A: Performed by: INTERNAL MEDICINE

## 2021-10-29 PROCEDURE — 99152 MOD SED SAME PHYS/QHP 5/>YRS: CPT | Performed by: INTERNAL MEDICINE

## 2021-10-29 PROCEDURE — 74011636637 HC RX REV CODE- 636/637: Performed by: INTERNAL MEDICINE

## 2021-10-29 PROCEDURE — 2709999900 HC NON-CHARGEABLE SUPPLY: Performed by: INTERNAL MEDICINE

## 2021-10-29 PROCEDURE — 74011000636 HC RX REV CODE- 636: Performed by: INTERNAL MEDICINE

## 2021-10-29 PROCEDURE — C1894 INTRO/SHEATH, NON-LASER: HCPCS | Performed by: INTERNAL MEDICINE

## 2021-10-29 PROCEDURE — 77030042317 HC BND COMPR HEMSTAT -B: Performed by: INTERNAL MEDICINE

## 2021-10-29 PROCEDURE — C1874 STENT, COATED/COV W/DEL SYS: HCPCS | Performed by: INTERNAL MEDICINE

## 2021-10-29 PROCEDURE — C1725 CATH, TRANSLUMIN NON-LASER: HCPCS | Performed by: INTERNAL MEDICINE

## 2021-10-29 PROCEDURE — 92928 PRQ TCAT PLMT NTRAC ST 1 LES: CPT | Performed by: INTERNAL MEDICINE

## 2021-10-29 DEVICE — STENT RONYX25018UX RESOLUTE ONYX 2.50X18
Type: IMPLANTABLE DEVICE | Status: FUNCTIONAL
Brand: RESOLUTE ONYX™

## 2021-10-29 RX ORDER — METOPROLOL TARTRATE 25 MG/1
25 TABLET, FILM COATED ORAL 2 TIMES DAILY
Status: DISCONTINUED | OUTPATIENT
Start: 2021-10-29 | End: 2021-10-31 | Stop reason: HOSPADM

## 2021-10-29 RX ORDER — GUAIFENESIN 100 MG/5ML
LIQUID (ML) ORAL AS NEEDED
Status: DISCONTINUED | OUTPATIENT
Start: 2021-10-29 | End: 2021-10-29

## 2021-10-29 RX ORDER — HEPARIN SODIUM 200 [USP'U]/100ML
INJECTION, SOLUTION INTRAVENOUS
Status: DISCONTINUED | OUTPATIENT
Start: 2021-10-29 | End: 2021-10-29

## 2021-10-29 RX ORDER — LISINOPRIL 20 MG/1
20 TABLET ORAL DAILY
Qty: 30 TABLET | Refills: 12 | Status: SHIPPED | OUTPATIENT
Start: 2021-10-29 | End: 2022-04-28

## 2021-10-29 RX ORDER — ZOLPIDEM TARTRATE 5 MG/1
5 TABLET ORAL
Status: DISCONTINUED | OUTPATIENT
Start: 2021-10-29 | End: 2021-10-31 | Stop reason: HOSPADM

## 2021-10-29 RX ORDER — CLOPIDOGREL BISULFATE 75 MG/1
75 TABLET ORAL DAILY
Qty: 30 TABLET | Refills: 12 | Status: SHIPPED | OUTPATIENT
Start: 2021-10-29

## 2021-10-29 RX ORDER — INSULIN LISPRO 100 [IU]/ML
12 INJECTION, SOLUTION INTRAVENOUS; SUBCUTANEOUS ONCE
Status: DISCONTINUED | OUTPATIENT
Start: 2021-10-29 | End: 2021-10-29

## 2021-10-29 RX ORDER — SODIUM CHLORIDE 0.9 % (FLUSH) 0.9 %
5-40 SYRINGE (ML) INJECTION AS NEEDED
Status: DISCONTINUED | OUTPATIENT
Start: 2021-10-29 | End: 2021-10-31 | Stop reason: HOSPADM

## 2021-10-29 RX ORDER — SODIUM CHLORIDE 9 MG/ML
125 INJECTION, SOLUTION INTRAVENOUS CONTINUOUS
Status: DISCONTINUED | OUTPATIENT
Start: 2021-10-29 | End: 2021-10-31 | Stop reason: HOSPADM

## 2021-10-29 RX ORDER — CLOPIDOGREL BISULFATE 75 MG/1
75 TABLET ORAL DAILY
Status: DISCONTINUED | OUTPATIENT
Start: 2021-10-30 | End: 2021-10-31 | Stop reason: HOSPADM

## 2021-10-29 RX ORDER — FENTANYL CITRATE 50 UG/ML
INJECTION, SOLUTION INTRAMUSCULAR; INTRAVENOUS AS NEEDED
Status: DISCONTINUED | OUTPATIENT
Start: 2021-10-29 | End: 2021-10-29

## 2021-10-29 RX ORDER — SODIUM CHLORIDE 9 MG/ML
100 INJECTION, SOLUTION INTRAVENOUS CONTINUOUS
Status: DISPENSED | OUTPATIENT
Start: 2021-10-29 | End: 2021-10-29

## 2021-10-29 RX ORDER — INSULIN LISPRO 100 [IU]/ML
7 INJECTION, SOLUTION INTRAVENOUS; SUBCUTANEOUS ONCE
Status: COMPLETED | OUTPATIENT
Start: 2021-10-29 | End: 2021-10-29

## 2021-10-29 RX ORDER — LORAZEPAM 1 MG/1
1 TABLET ORAL
Status: DISCONTINUED | OUTPATIENT
Start: 2021-10-29 | End: 2021-10-31 | Stop reason: HOSPADM

## 2021-10-29 RX ORDER — LIDOCAINE HYDROCHLORIDE 10 MG/ML
INJECTION, SOLUTION EPIDURAL; INFILTRATION; INTRACAUDAL; PERINEURAL AS NEEDED
Status: DISCONTINUED | OUTPATIENT
Start: 2021-10-29 | End: 2021-10-29

## 2021-10-29 RX ORDER — INSULIN LISPRO 100 [IU]/ML
INJECTION, SOLUTION INTRAVENOUS; SUBCUTANEOUS
Status: DISCONTINUED | OUTPATIENT
Start: 2021-10-29 | End: 2021-10-30

## 2021-10-29 RX ORDER — HEPARIN SODIUM 1000 [USP'U]/ML
INJECTION, SOLUTION INTRAVENOUS; SUBCUTANEOUS AS NEEDED
Status: DISCONTINUED | OUTPATIENT
Start: 2021-10-29 | End: 2021-10-29

## 2021-10-29 RX ORDER — SODIUM CHLORIDE 0.9 % (FLUSH) 0.9 %
5-40 SYRINGE (ML) INJECTION EVERY 8 HOURS
Status: DISCONTINUED | OUTPATIENT
Start: 2021-10-29 | End: 2021-10-31 | Stop reason: HOSPADM

## 2021-10-29 RX ORDER — MIDAZOLAM HYDROCHLORIDE 1 MG/ML
INJECTION, SOLUTION INTRAMUSCULAR; INTRAVENOUS AS NEEDED
Status: DISCONTINUED | OUTPATIENT
Start: 2021-10-29 | End: 2021-10-29

## 2021-10-29 RX ORDER — CLOPIDOGREL BISULFATE 75 MG/1
TABLET ORAL AS NEEDED
Status: DISCONTINUED | OUTPATIENT
Start: 2021-10-29 | End: 2021-10-29

## 2021-10-29 RX ORDER — VERAPAMIL HYDROCHLORIDE 2.5 MG/ML
INJECTION, SOLUTION INTRAVENOUS AS NEEDED
Status: DISCONTINUED | OUTPATIENT
Start: 2021-10-29 | End: 2021-10-29

## 2021-10-29 RX ADMIN — ALUMINUM HYDROXIDE AND MAGNESIUM HYDROXIDE 15 ML: 200; 200 SUSPENSION ORAL at 16:26

## 2021-10-29 RX ADMIN — BUSPIRONE HYDROCHLORIDE 15 MG: 5 TABLET ORAL at 18:00

## 2021-10-29 RX ADMIN — INSULIN GLARGINE 24 UNITS: 100 INJECTION, SOLUTION SUBCUTANEOUS at 10:25

## 2021-10-29 RX ADMIN — HUMAN INSULIN 10 UNITS: 100 INJECTION, SOLUTION SUBCUTANEOUS at 14:17

## 2021-10-29 RX ADMIN — METOPROLOL TARTRATE 25 MG: 25 TABLET, FILM COATED ORAL at 10:51

## 2021-10-29 RX ADMIN — INSULIN LISPRO 10 UNITS: 100 INJECTION, SOLUTION INTRAVENOUS; SUBCUTANEOUS at 10:26

## 2021-10-29 RX ADMIN — METOPROLOL TARTRATE 25 MG: 25 TABLET, FILM COATED ORAL at 18:01

## 2021-10-29 RX ADMIN — SODIUM CHLORIDE 10 ML: 9 INJECTION, SOLUTION INTRAMUSCULAR; INTRAVENOUS; SUBCUTANEOUS at 01:25

## 2021-10-29 RX ADMIN — LISINOPRIL 10 MG: 10 TABLET ORAL at 18:01

## 2021-10-29 RX ADMIN — SODIUM CHLORIDE 10 ML: 9 INJECTION, SOLUTION INTRAMUSCULAR; INTRAVENOUS; SUBCUTANEOUS at 21:04

## 2021-10-29 RX ADMIN — LEVOTHYROXINE SODIUM 112 MCG: 0.11 TABLET ORAL at 10:25

## 2021-10-29 RX ADMIN — ATORVASTATIN CALCIUM 40 MG: 40 TABLET, FILM COATED ORAL at 21:02

## 2021-10-29 RX ADMIN — LORAZEPAM 1 MG: 1 TABLET ORAL at 21:01

## 2021-10-29 RX ADMIN — Medication 10 ML: at 14:18

## 2021-10-29 RX ADMIN — LISINOPRIL 10 MG: 10 TABLET ORAL at 10:25

## 2021-10-29 RX ADMIN — INSULIN LISPRO 10 UNITS: 100 INJECTION, SOLUTION INTRAVENOUS; SUBCUTANEOUS at 16:26

## 2021-10-29 RX ADMIN — ACETAMINOPHEN 650 MG: 325 TABLET ORAL at 21:01

## 2021-10-29 RX ADMIN — BUSPIRONE HYDROCHLORIDE 15 MG: 5 TABLET ORAL at 10:25

## 2021-10-29 RX ADMIN — INSULIN LISPRO 5 UNITS: 100 INJECTION, SOLUTION INTRAVENOUS; SUBCUTANEOUS at 22:11

## 2021-10-29 RX ADMIN — ATORVASTATIN CALCIUM 40 MG: 40 TABLET, FILM COATED ORAL at 01:25

## 2021-10-29 RX ADMIN — ALUMINUM HYDROXIDE AND MAGNESIUM HYDROXIDE 15 ML: 200; 200 SUSPENSION ORAL at 21:01

## 2021-10-29 RX ADMIN — INSULIN LISPRO 7 UNITS: 100 INJECTION, SOLUTION INTRAVENOUS; SUBCUTANEOUS at 01:24

## 2021-10-29 RX ADMIN — SODIUM CHLORIDE 10 ML: 9 INJECTION, SOLUTION INTRAMUSCULAR; INTRAVENOUS; SUBCUTANEOUS at 14:18

## 2021-10-29 RX ADMIN — Medication 10 ML: at 21:04

## 2021-10-29 RX ADMIN — SODIUM CHLORIDE 10 ML: 9 INJECTION, SOLUTION INTRAMUSCULAR; INTRAVENOUS; SUBCUTANEOUS at 06:43

## 2021-10-29 NOTE — PROGRESS NOTES
Progress Note      10/29/2021 7:15 AM  NAME: Alistair Arriaga   MRN:  [id number]   Admit Diagnosis: Chest pain [R07.9]     Assessment:     Non cardiac dizziness and SOB   Coronary artery disease  - stable   S/p PCI of RCA , LAD and CX with stenting 2019   - stable   Insulin dependent Type 1 Diabetes ,  Poorly controlled   Hyperlipidemia   Obesity   Sleep Apnea   Nausea   Sees GI at [facility] for GI issues. NOS       10/29   Troponin shows increase this AM to 593  ( high sensitivity test ) , so that is a change. It is slightly positive now . He has not had any new angina . Creatinine is 1.0 . Plan:     Given the elevation in the Troponin level today , will proceed with Cath this morning and PCI if needed. Reviewed with him and he agrees. Has not had anything to eat yet . Will proceed. [x]        High complexity decision making was performed    Subjective:     Alistair Arriaga denies chest pain, dyspnea. Discussed with RN events overnight.      Patient Active Problem List   Diagnosis Code    [facility] mellitus) [facility] E11.9    Acquired hypothyroidism E03.9    Essential hypertension I10    Fibromyalgia M79.7    Microalbuminuria R80.9    Anxiety F41.9    Migraine without aura G43.009    Hypertriglyceridemia E78.1    Severe obesity (HCC) E66.01    Bilateral carotid artery stenosis I65.23    Transient ischemic attack involving left internal carotid artery G45.1    Chest pain R07.9    Unstable angina (HCC) I20.0    Coronary artery disease involving native coronary artery of native heart with unstable angina pectoris (HCC) I25.110    Type 2 diabetes with nephropathy ([facility]) E11.21    Dysthymia F34.1    Adjustment disorder with mixed emotional features F43.29    Anxiety disorder due to general medical condition with panic attack F06.4, F41.0    Insomnia disorder with non-sleep disorder mental comorbidity G47.00    Dizzy spells R42    Multiple lacunar infarcts ([facility] I63.81 Review of Systems:    Symptom Y/N Comments  Symptom Y/N Comments   Fever/Chills N   Chest Pain N    Poor Appetite N   Edema N    Cough N   Abdominal Pain N    Sputum N   Joint Pain N    SOB/COLE N   Pruritis/Rash N    Nausea/vomit N   Tolerating PT/OT Y    Diarrhea N   Tolerating Diet Y    Constipation N   Other       Could NOT obtain due to:      Objective:      Physical Exam:    Last 24hrs VS reviewed since prior progress note. Most recent are:    Visit Vitals  /66 (BP 1 Location: Right upper arm, BP Patient Position: At rest)   Pulse 82   Temp 97.9 °F (36.6 °C)   Resp 18   Ht 5' 9\" (1.753 m)   Wt 117.9 kg (260 lb)   SpO2 95%   BMI 38.40 kg/m²     No intake or output data in the 24 hours ending 10/29/21 0715     General Appearance: Well developed, well nourished, alert & oriented x 3,    no acute distress. Ears/Nose/Mouth/Throat: Hearing grossly normal.  Neck: Supple. Chest: Lungs clear to auscultation bilaterally. Cardiovascular: Regular rate and rhythm, S1S2 normal, no murmur. Abdomen: Soft, non-tender, bowel sounds are active. Extremities: No edema bilaterally. Skin: Warm and dry.     PMH/SH reviewed - no change compared to H&P    Data Review    Telemetry: normal sinus rhythm     Lab Data Personally Reviewed:    Recent Labs     10/29/21  0322 10/27/21  2345   WBC 7.4 8.0   HGB 13.6 13.1   HCT 40.6 38.2    186   LABRCNT(INR:3,PTP:3,APTT:3,)  Recent Labs     10/29/21  0322 10/27/21  2345   * 131*   K 4.6 4.1   CL 95* 99   CO2 26 26   BUN 19 16   CREA 1.05 1.26   * 368*   CA 9.4 9.8   LABRCNT(CPK:3,CpKMB:3,ckndx:3,troiq:3)  Lab Results   Component Value Date/Time    Cholesterol, total 142 10/07/2021 01:50 PM    HDL Cholesterol 35 10/07/2021 01:50 PM    LDL,Direct 151 (H) 08/24/2014 02:16 AM    LDL, calculated 62.4 10/07/2021 01:50 PM    Triglyceride 223 (H) 10/07/2021 01:50 PM    CHOL/HDL Ratio 4.1 10/07/2021 01:50 PM LABRCNT(sgot:3,gpt:3,ap:3,tbiL:3,TP:3,ALB:3,GLOB:3,ggt:3,aml:3,amyp:3,lpse:3,hlpse:3)No results for input(s): PH, PCO2, PO2 in the last 72 hours. Lab Results   Component Value Date/Time    Cholesterol, total 142 10/07/2021 01:50 PM    HDL Cholesterol 35 10/07/2021 01:50 PM    LDL,Direct 151 (H) 08/24/2014 02:16 AM    LDL, calculated 62.4 10/07/2021 01:50 PM    Triglyceride 223 (H) 10/07/2021 01:50 PM    CHOL/HDL Ratio 4.1 10/07/2021 01:50 PM   MEDTABLETimherman Ordaz MD  No results for input(s): PH, PCO2, PO2 in the last 72 hours.     Medications Personally Reviewed:    Current Facility-Administered Medications   Medication Dose Route Frequency    insulin lispro (HUMALOG) injection   SubCUTAneous AC&HS    sodium chloride (NS) flush 5-40 mL  5-40 mL IntraVENous Q8H    sodium chloride (NS) flush 5-40 mL  5-40 mL IntraVENous PRN    polyethylene glycol (MIRALAX) packet 17 g  17 g Oral DAILY PRN    ondansetron (ZOFRAN ODT) tablet 4 mg  4 mg Oral Q8H PRN    Or    ondansetron (ZOFRAN) injection 4 mg  4 mg IntraVENous Q6H PRN    enoxaparin (LOVENOX) injection 40 mg  40 mg SubCUTAneous DAILY    busPIRone (BUSPAR) tablet 15 mg  15 mg Oral BID    aspirin delayed-release tablet 81 mg  81 mg Oral DAILY    levothyroxine (SYNTHROID) tablet 112 mcg  112 mcg Oral ACB    lisinopriL (PRINIVIL, ZESTRIL) tablet 10 mg  10 mg Oral BID    glucose chewable tablet 16 g  4 Tablet Oral PRN    dextrose (D50W) injection syrg 12.5-25 g  25-50 mL IntraVENous PRN    glucagon (GLUCAGEN) injection 1 mg  1 mg IntraMUSCular PRN    acetaminophen (TYLENOL) tablet 650 mg  650 mg Oral Q6H PRN    insulin glargine (LANTUS) injection 24 Units  0.2 Units/kg SubCUTAneous DAILY    atorvastatin (LIPITOR) tablet 40 mg  40 mg Oral QHS         Kaz Lang MD

## 2021-10-29 NOTE — PROGRESS NOTES
TRANSFER - OUT REPORT:    Verbal report given to Ruth (name) on Prem Michael  being transferred to [facility] (unit) for routine progression of care       Report consisted of patients Situation, Background, Assessment and   Recommendations(SBAR). Information from the following report(s) SBAR, Kardex, Procedure Summary, Intake/Output, MAR, Accordion, Recent Results, Cardiac Rhythm SR and Quality Measures was reviewed with the receiving nurse. Lines:   Peripheral IV 10/28/21 Anterior;Left;Proximal Forearm (Active)   Site Assessment Clean, dry, & intact 10/28/21 2100   Phlebitis Assessment 0 10/28/21 2100   Infiltration Assessment 0 10/28/21 2100   Dressing Status Clean, dry, & intact 10/28/21 2100   Dressing Type Transparent 10/28/21 2100   Hub Color/Line Status [place] 10/28/21 2100   Alcohol Cap Used Yes 10/28/21 2100        Opportunity for questions and clarification was provided.       Patient transported with:   Monitor

## 2021-10-29 NOTE — PROCEDURES
L Cath     PCI of Cx Marginal for in stent restenosis . Stented RCA with mild disease   Short Left Main normal     Stented LAD with mild  Disease. Stented Cx Marginal with 80% stenosis . PCI with 2.5 xc  18 Navneet OG with 0% residual   PHIL 3 flow     No V  Gram     R Radial    Heparin    Plavix .

## 2021-10-29 NOTE — CONSULTS
Consult/Admission    NAME: Dre Guerrero   :  1980   MRN:  [id number]     Date/Time:  10/29/2021 7:02 AM                                   This is a delayed entry Consult note on this patient who was seen and examined on 10/28/21 for cardiac consultation . Patient PCP: Kamron Shrestha MD  ________________________________________________________________________     Assessment:     Non cardiac dizziness and SOB   Coronary artery disease  - stable   S/p PCI of RCA , LAD and CX with stenting 2019   - stable   Insulin dependent Type 1 Diabetes ,  Poorly controlled   Hyperlipidemia   Obesity   Sleep Apnea   Nausea   Sees GI at [facility] for GI issues. NOS             Plan:     Continue on cardiac meds as OP   No plan for further cardiac testing at this time , unless clinical course changes. [x]           High complexity decision making was performed         Subjective:   CHIEF COMPLAINT: Dizziness,  SOB . HISTORY OF PRESENT ILLNESS:       Dre Guerrero is a 42 y.o. male, with significant pmhx of CAD, s/p PCI of RCA , LAD and Circumflex arteries 2019 . He has poorly controlled diabetes, reflux, Hyperlipidemia , sleep apnea who presents via private vehicle to the ED with c/o sudden onset of dizziness, diaphoresis and shortness of breath that started while \"horsing around\" with his girlfriend. Patient reports having episode of nausea with dry heaving without actual vomiting. He is currently followed by GI at [facility] for ongoing abdominal issues. He  Specifically denies chest pain with associated shortness of breath. Prior cardiac history is notable for:   Acute MI  In 2019 ,  With PCI of RCA , LAD and Cx arteries . RCA was  . He has not had further cardiac complications, symptoms or intervention . The Troponin levels here are normal .    The EKG is reviewed and there are no acute changes.          Past Medical History:   Diagnosis Date    Anxiety and depression     Bleeding of eye, left     21 pt reports receiving injections in right eye for beeding    Chronic headaches 2007    COVID-19 2020    Diabetes [facility]     GERD (gastroesophageal reflux disease)     Hypercholesterolemia     Hypertension     Hypothyroidism     MI (myocardial infarction) ([facility]     as of 21 pt reports 8 stents total    WALLY on CPAP       Past Surgical History:   Procedure Laterality Date    HX CARPAL TUNNEL RELEASE Left     HX CARPAL TUNNEL RELEASE Right     CTE trigger thumb and index finger    HX HEART CATHETERIZATION      HX LAP CHOLECYSTECTOMY  14    Dr Kimberly Aponte    HX WISDOM TEETH EXTRACTION       Allergies   Allergen Reactions    Morphine Nausea and Vomiting    Penicillin G Swelling    Percocet [Oxycodone-Acetaminophen] Hives and Nausea and Vomiting     Pt is allergic to Oxycodone, has previously tolerated Tylenol and Hydrocodone.     Sulfa (Sulfonamide Antibiotics) Swelling    Tramadol Nausea Only     Nausea and headache        Meds:  See below  Social History     Tobacco Use    Smoking status: Former Smoker     Packs/day: 0.00     Years: 14.00     Pack years: 0.00     Quit date: 2014     Years since quittin.8    Smokeless tobacco: Never Used   Substance Use Topics    Alcohol use: No      Family History   Problem Relation Age of Onset    Diabetes Mother     Heart Disease Father     Cancer Father     Diabetes Father     Diabetes Paternal Aunt     Diabetes Maternal Grandmother     Thyroid Cancer Maternal Grandmother     Kidney Disease Maternal Grandmother     Arthritis Maternal Grandmother     Heart Disease Maternal Grandfather     High Cholesterol Maternal Grandfather     Hypertension Maternal Grandfather     Diabetes Paternal Grandmother     Hemophilia Paternal Grandfather        REVIEW OF SYSTEMS:     []            Unable to obtain  ROS due to ---   [x]            Total of 12 systems reviewed as follows:    Constitutional: negative fever, negative chills, negative weight loss  Eyes:   negative visual changes  ENT:   negative sore throat, tongue or lip swelling  Respiratory:  negative cough, negative dyspnea  Cards:  negative for chest pain, palpitations, lower extremity edema  GI:   negative for nausea, vomiting, diarrhea, and abdominal pain  Genitourinary: negative for frequency, dysuria  Integument:  negative for rash   Hematologic:  negative for easy bruising and gum/nose bleeding  Musculoskel: negative for myalgias,  back pain  Neurological:  negative for headaches, dizziness, vertigo, weakness  Behavl/Psych: negative for feelings of anxiety, depression     Pertinent Positives include :    Objective:      Physical Exam:    Last 24hrs VS reviewed since prior progress note. Most recent are:    Visit Vitals  /66 (BP 1 Location: Right upper arm, BP Patient Position: At rest)   Pulse 82   Temp 97.9 °F (36.6 °C)   Resp 18   Ht 5' 9\" (1.753 m)   Wt 117.9 kg (260 lb)   SpO2 95%   BMI 38.40 kg/m²     No intake or output data in the 24 hours ending 10/29/21 0702     General Appearance: Well developed, well nourished, alert & oriented x 3,    no acute distress. Ears/Nose/Mouth/Throat: Pupils equal and round, Hearing grossly normal.  Neck: Supple. JVP within normal limits. Carotids good upstrokes, with no bruit. Chest: Lungs clear to auscultation bilaterally. Cardiovascular: Regular rate and rhythm, S1S2 normal, no murmur, rubs, gallops. Abdomen: Soft, non-tender, bowel sounds are active. No organomegaly. Extremities: No edema bilaterally. Femoral pulses +2, Distal Pulses +1. Skin: Warm and dry. Neuro: CN II-XII grossly intact, Strength and sensation grossly intact. Data:      Prior to Admission medications    Medication Sig Start Date End Date Taking?  Authorizing Provider   prednisoLONE acetate (PRED FORTE) 1 % ophthalmic suspension  10/12/21   Provider, Historical   ofloxacin (FLOXIN) 0.3 % ophthalmic solution  10/12/21 Provider, Historical   busPIRone (BUSPAR) 15 mg tablet Take 1 tablet by mouth twice daily 10/14/21   Joni BREWER MD   sertraline (ZOLOFT) 100 mg tablet Take 1 tablet by mouth once daily 10/1/21   Joni BREWER MD   metFORMIN (GLUCOPHAGE) 500 mg tablet Take 1 Tablet by mouth two (2) times daily (with meals). 10/1/21   Mary Bustamante MD   insulin regular (HumuLIN R Regular U-100 Insuln) 100 unit/mL injection 30 units with breakfast,30 units with lunch and  30 units with dinner plus correction. 160 units per day max  Patient taking differently: 20 units with breakfast, 20 units with lunch and  20 units with dinner plus correction. 160 units per day max 9/29/21   Mary Bustamante MD   insulin glargine (Lantus U-100 Insulin) 100 unit/mL injection INJECT 95 UNITS SUBCUTANEOUSLY IN THE MORNING AND 95 UNITS AT NIGHT  Patient taking differently: INJECT 90 UNITS SUBCUTANEOUSLY IN THE MORNING AND 90 UNITS AT NIGHT 9/1/21   Mary Bustamante MD   lisinopriL (PRINIVIL, ZESTRIL) 20 mg tablet Take 10 mg by mouth two (2) times a day. Provider, Historical   levothyroxine (SYNTHROID) 112 mcg tablet TAKE 1 TABLET BY MOUTH ONCE DAILY BEFORE BREAKFAST 7/27/21   Mary Bustamante MD   testosterone cypionate (DEPOTESTOTERONE CYPIONATE) 200 mg/mL injection INJECT 1/2 ML (100 MG) INTRAMUSCULARLY EVERY WEEK  Patient not taking: Reported on 10/18/2021 6/23/21   Mary Bustamante MD   flash glucose sensor (FreeStyle ELHAM MERVIN [facility] 2 Sensor) kit Change sensor every 14 days 3/26/21   Mary Bustamante MD   flash glucose scanning reader [facility] 2 [place]) misc Change sensor every 14 days 3/26/21   Mary Bustamante MD   cholecalciferol (Vitamin D3) (5000 Units/125 mcg) tab tablet Take 1 Tab by mouth daily. 3/10/21   Briana Hurst MD   Insulin Syringe-Needle U-100 (BD Insulin Syringe) 1 mL 25 x 1\" syrg Use for drawing up/injecting testosterone once weekly.  12/7/20   Mary Bustamante MD   Syringe with Needle, Disp, 1 mL 25 gauge x 5/8\" syrg Use with testosterone once every week 12/4/20   Alirio Iverson MD   rosuvastatin (CRESTOR) 20 mg tablet Take 1 tablet by mouth nightly 10/2/20   Domah, Isa Duane, MD   LORazepam (ATIVAN) 1 mg tablet Take 1 Tab by mouth every four (4) hours as needed for Anxiety. Max Daily Amount: 6 mg. 5/8/20   Maik Lobo MD   aluminum & magnesium hydroxide-simethicone (Maalox Maximum Strength) 400-400-40 mg/5 mL suspension Take 10 mL by mouth every six (6) hours as needed for Indigestion. Provider, Historical   Omeprazole delayed release (PRILOSEC D/R) 20 mg tablet Take 1 Tab by mouth daily. 2/14/20   Anita Elise DO   clopidogrel (PLAVIX) 75 mg tab TAKE 1 TABLET BY MOUTH ONCE DAILY 12/23/19   Provider, Historical   nitroglycerin (NITROSTAT) 0.4 mg SL tablet Take 1 Tab by mouth every five (5) minutes as needed for Chest Pain. Sit down then put one tab under the tongue every 5 minutes as needed 12/4/19   Kya Chowdary MD   metoprolol succinate (TOPROL-XL) 25 mg XL tablet Take 1 Tab by mouth daily. Patient taking differently: Take 25 mg by mouth two (2) times a day. 12/3/19   Maximilian Ruiz MD   aspirin delayed-release 81 mg tablet Take 1 Tab by mouth daily.  9/24/19   Regine Verduzco MD       Recent Results (from the past 24 hour(s))   GLUCOSE, POC    Collection Time: 10/28/21 12:10 PM   Result Value Ref Range    Glucose (POC) 461 (H) 65 - 117 mg/dL    Performed by Bridgette Appl (TRV PCT)    GLUCOSE, POC    Collection Time: 10/28/21  4:41 PM   Result Value Ref Range    Glucose (POC) 464 (H) 65 - 117 mg/dL    Performed by Bridgette Appl (TRV PCT)    GLUCOSE, POC    Collection Time: 10/28/21  9:41 PM   Result Value Ref Range    Glucose (POC) 425 (H) 65 - 117 mg/dL    Performed by Krystal Pryor (PCT)    TROPONIN-HIGH SENSITIVITY    Collection Time: 10/29/21  3:22 AM   Result Value Ref Range    Troponin-High Sensitivity 593 (HH) 0 - 76 ng/L   CBC W/O DIFF    Collection Time: 10/29/21  3:22 AM   Result Value Ref Range    WBC 7.4 4.1 - 11.1 K/uL    RBC 4.82 4.10 - 5.70 M/uL    HGB 13.6 12.1 - 17.0 g/dL    HCT 40.6 36.6 - 50.3 %    MCV 84.2 80.0 - 99.0 FL    MCH 28.2 26.0 - 34.0 PG    MCHC 33.5 30.0 - 36.5 g/dL    RDW 14.1 11.5 - 14.5 %    PLATELET 979 061 - 007 K/uL    MPV 10.3 8.9 - 12.9 FL    NRBC 0.0 0  WBC    ABSOLUTE NRBC 0.00 0.00 - 7.41 K/uL   METABOLIC PANEL, BASIC    Collection Time: 10/29/21  3:22 AM   Result Value Ref Range    Sodium 129 (L) 136 - 145 mmol/L    Potassium 4.6 3.5 - 5.1 mmol/L    Chloride 95 (L) 97 - 108 mmol/L    CO2 26 21 - 32 mmol/L    Anion gap 8 5 - 15 mmol/L    Glucose 400 (H) 65 - 100 mg/dL    BUN 19 6 - 20 MG/DL    Creatinine 1.05 0.70 - 1.30 MG/DL    BUN/Creatinine ratio 18 12 - 20      GFR est AA >60 >60 ml/min/1.73m2    GFR est non-AA >60 >60 ml/min/1.73m2    Calcium 9.4 8.5 - 10.1 MG/DL

## 2021-10-29 NOTE — PROGRESS NOTES
0130-Pt BS uncontrolled educated on need to control urge to constantly snack. Pt verbalized not knowing that having a high glucose level can make you hungrier. Aware that he isn't on his home dosage of insulin but diet addressed. On call paged for pt persistent glucose level over 400 sliding scale adjusted and one time order for humalog ordered and given. 0600-Received critical result Troponin 593 on pt whose trend wasn't above 60. MD called stat to determine next steps. MD  Came to bedside to evaluate pt who had been asymptomatic overnight and still has no complaints this morning. New plan of having cardiac cath today. 0730-Day nurse updated on the above.

## 2021-10-29 NOTE — PROGRESS NOTES
Doing well     OK to discharge home today from my standpoint     See me 1 mth     All Daily :   ASA 81 mg   Plavix 75 mg   Metoprolol succinate 25 mg   Lisinopril 20 mg   Rosuvastatin 20 mg .

## 2021-10-29 NOTE — PROGRESS NOTES
Received notification from bedside RN about patient with regards to: , been consistently in 400's    Intervention given: Change Lispro SS to Insulin resistant scale, Lispro 7 units SQ x 1 dose ordered

## 2021-10-29 NOTE — PROGRESS NOTES
Problem: Diabetes Self-Management  Goal: *Prevention, detection, treatment of acute complications  Description: List symptoms of hyper- and hypoglycemia; describe how to treat low blood sugar and actions for lowering  high blood glucose level. Outcome: Progressing Towards Goal  Goal: *Prevention, detection and treatment of chronic complications  Description: Define the natural course of diabetes and describe the relationship of blood glucose levels to long term complications of diabetes.   Outcome: Progressing Towards Goal     Problem: Hypertension  Goal: *Fluid volume balance  Outcome: Progressing Towards Goal

## 2021-10-30 LAB
ANION GAP SERPL CALC-SCNC: 4 MMOL/L (ref 5–15)
BASOPHILS # BLD: 0.1 K/UL (ref 0–0.1)
BASOPHILS NFR BLD: 1 % (ref 0–1)
BUN SERPL-MCNC: 20 MG/DL (ref 6–20)
BUN/CREAT SERPL: 17 (ref 12–20)
CALCIUM SERPL-MCNC: 10 MG/DL (ref 8.5–10.1)
CHLORIDE SERPL-SCNC: 95 MMOL/L (ref 97–108)
CO2 SERPL-SCNC: 28 MMOL/L (ref 21–32)
CREAT SERPL-MCNC: 1.2 MG/DL (ref 0.7–1.3)
DIFFERENTIAL METHOD BLD: ABNORMAL
EOSINOPHIL # BLD: 0.2 K/UL (ref 0–0.4)
EOSINOPHIL NFR BLD: 4 % (ref 0–7)
ERYTHROCYTE [DISTWIDTH] IN BLOOD BY AUTOMATED COUNT: 14.1 % (ref 11.5–14.5)
GLUCOSE BLD STRIP.AUTO-MCNC: 329 MG/DL (ref 65–117)
GLUCOSE BLD STRIP.AUTO-MCNC: 351 MG/DL (ref 65–117)
GLUCOSE BLD STRIP.AUTO-MCNC: 460 MG/DL (ref 65–117)
GLUCOSE BLD STRIP.AUTO-MCNC: 463 MG/DL (ref 65–117)
GLUCOSE BLD STRIP.AUTO-MCNC: 467 MG/DL (ref 65–117)
GLUCOSE BLD STRIP.AUTO-MCNC: 507 MG/DL (ref 65–117)
GLUCOSE BLD STRIP.AUTO-MCNC: 91 MG/DL (ref 65–117)
GLUCOSE SERPL-MCNC: 430 MG/DL (ref 65–100)
HCT VFR BLD AUTO: 38.5 % (ref 36.6–50.3)
HGB BLD-MCNC: 12.9 G/DL (ref 12.1–17)
IMM GRANULOCYTES # BLD AUTO: 0.1 K/UL (ref 0–0.04)
IMM GRANULOCYTES NFR BLD AUTO: 1 % (ref 0–0.5)
LYMPHOCYTES # BLD: 1.4 K/UL (ref 0.8–3.5)
LYMPHOCYTES NFR BLD: 22 % (ref 12–49)
MCH RBC QN AUTO: 28 PG (ref 26–34)
MCHC RBC AUTO-ENTMCNC: 33.5 G/DL (ref 30–36.5)
MCV RBC AUTO: 83.5 FL (ref 80–99)
MONOCYTES # BLD: 0.6 K/UL (ref 0–1)
MONOCYTES NFR BLD: 8 % (ref 5–13)
NEUTS SEG # BLD: 4.3 K/UL (ref 1.8–8)
NEUTS SEG NFR BLD: 64 % (ref 32–75)
NRBC # BLD: 0 K/UL (ref 0–0.01)
NRBC BLD-RTO: 0 PER 100 WBC
PLATELET # BLD AUTO: 171 K/UL (ref 150–400)
PMV BLD AUTO: 10.2 FL (ref 8.9–12.9)
POTASSIUM SERPL-SCNC: 4.6 MMOL/L (ref 3.5–5.1)
RBC # BLD AUTO: 4.61 M/UL (ref 4.1–5.7)
SERVICE CMNT-IMP: ABNORMAL
SERVICE CMNT-IMP: NORMAL
SODIUM SERPL-SCNC: 127 MMOL/L (ref 136–145)
WBC # BLD AUTO: 6.6 K/UL (ref 4.1–11.1)

## 2021-10-30 PROCEDURE — 74011636637 HC RX REV CODE- 636/637: Performed by: GENERAL ACUTE CARE HOSPITAL

## 2021-10-30 PROCEDURE — 74011250636 HC RX REV CODE- 250/636: Performed by: INTERNAL MEDICINE

## 2021-10-30 PROCEDURE — 36415 COLL VENOUS BLD VENIPUNCTURE: CPT

## 2021-10-30 PROCEDURE — 74011250637 HC RX REV CODE- 250/637: Performed by: INTERNAL MEDICINE

## 2021-10-30 PROCEDURE — 94760 N-INVAS EAR/PLS OXIMETRY 1: CPT

## 2021-10-30 PROCEDURE — 99218 HC RM OBSERVATION: CPT

## 2021-10-30 PROCEDURE — 82962 GLUCOSE BLOOD TEST: CPT

## 2021-10-30 PROCEDURE — 85025 COMPLETE CBC W/AUTO DIFF WBC: CPT

## 2021-10-30 PROCEDURE — 80048 BASIC METABOLIC PNL TOTAL CA: CPT

## 2021-10-30 PROCEDURE — 77010033678 HC OXYGEN DAILY

## 2021-10-30 RX ORDER — DEXTROSE 50 % IN WATER (D50W) INTRAVENOUS SYRINGE
12.5-25 AS NEEDED
Status: DISCONTINUED | OUTPATIENT
Start: 2021-10-30 | End: 2021-10-31 | Stop reason: HOSPADM

## 2021-10-30 RX ORDER — INSULIN GLARGINE 100 [IU]/ML
90 INJECTION, SOLUTION SUBCUTANEOUS DAILY
Status: DISCONTINUED | OUTPATIENT
Start: 2021-10-30 | End: 2021-10-30

## 2021-10-30 RX ORDER — INSULIN GLARGINE 100 [IU]/ML
45 INJECTION, SOLUTION SUBCUTANEOUS DAILY
Status: DISCONTINUED | OUTPATIENT
Start: 2021-10-30 | End: 2021-10-30

## 2021-10-30 RX ORDER — INSULIN LISPRO 100 [IU]/ML
INJECTION, SOLUTION INTRAVENOUS; SUBCUTANEOUS
Status: DISCONTINUED | OUTPATIENT
Start: 2021-10-30 | End: 2021-10-31 | Stop reason: HOSPADM

## 2021-10-30 RX ORDER — INSULIN GLARGINE 100 [IU]/ML
90 INJECTION, SOLUTION SUBCUTANEOUS DAILY
Status: DISCONTINUED | OUTPATIENT
Start: 2021-10-31 | End: 2021-10-30

## 2021-10-30 RX ORDER — INSULIN GLARGINE 100 [IU]/ML
90 INJECTION, SOLUTION SUBCUTANEOUS DAILY
Status: DISCONTINUED | OUTPATIENT
Start: 2021-10-31 | End: 2021-10-31 | Stop reason: HOSPADM

## 2021-10-30 RX ORDER — MAGNESIUM SULFATE 100 %
4 CRYSTALS MISCELLANEOUS AS NEEDED
Status: DISCONTINUED | OUTPATIENT
Start: 2021-10-30 | End: 2021-10-31 | Stop reason: HOSPADM

## 2021-10-30 RX ADMIN — HUMAN INSULIN 30 UNITS: 100 INJECTION, SOLUTION SUBCUTANEOUS at 12:44

## 2021-10-30 RX ADMIN — METOPROLOL TARTRATE 25 MG: 25 TABLET, FILM COATED ORAL at 09:21

## 2021-10-30 RX ADMIN — HUMAN INSULIN 30 UNITS: 100 INJECTION, SOLUTION SUBCUTANEOUS at 09:18

## 2021-10-30 RX ADMIN — BUSPIRONE HYDROCHLORIDE 15 MG: 5 TABLET ORAL at 17:32

## 2021-10-30 RX ADMIN — LEVOTHYROXINE SODIUM 112 MCG: 0.11 TABLET ORAL at 09:22

## 2021-10-30 RX ADMIN — METOPROLOL TARTRATE 25 MG: 25 TABLET, FILM COATED ORAL at 17:00

## 2021-10-30 RX ADMIN — ENOXAPARIN SODIUM 40 MG: 100 INJECTION SUBCUTANEOUS at 09:22

## 2021-10-30 RX ADMIN — INSULIN GLARGINE 45 UNITS: 100 INJECTION, SOLUTION SUBCUTANEOUS at 09:19

## 2021-10-30 RX ADMIN — ACETAMINOPHEN 650 MG: 325 TABLET ORAL at 19:37

## 2021-10-30 RX ADMIN — INSULIN GLARGINE 45 UNITS: 100 INJECTION, SOLUTION SUBCUTANEOUS at 09:20

## 2021-10-30 RX ADMIN — Medication 10 ML: at 16:53

## 2021-10-30 RX ADMIN — Medication 10 ML: at 21:59

## 2021-10-30 RX ADMIN — LISINOPRIL 10 MG: 10 TABLET ORAL at 16:51

## 2021-10-30 RX ADMIN — BUSPIRONE HYDROCHLORIDE 15 MG: 5 TABLET ORAL at 09:21

## 2021-10-30 RX ADMIN — ALUMINUM HYDROXIDE AND MAGNESIUM HYDROXIDE 15 ML: 200; 200 SUSPENSION ORAL at 16:50

## 2021-10-30 RX ADMIN — Medication 10 ML: at 06:00

## 2021-10-30 RX ADMIN — ASPIRIN 81 MG: 81 TABLET, COATED ORAL at 09:21

## 2021-10-30 RX ADMIN — HUMAN INSULIN 30 UNITS: 100 INJECTION, SOLUTION SUBCUTANEOUS at 16:51

## 2021-10-30 RX ADMIN — ALUMINUM HYDROXIDE AND MAGNESIUM HYDROXIDE 15 ML: 200; 200 SUSPENSION ORAL at 09:22

## 2021-10-30 RX ADMIN — INSULIN LISPRO 10 UNITS: 100 INJECTION, SOLUTION INTRAVENOUS; SUBCUTANEOUS at 16:30

## 2021-10-30 RX ADMIN — SODIUM CHLORIDE 10 ML: 9 INJECTION, SOLUTION INTRAMUSCULAR; INTRAVENOUS; SUBCUTANEOUS at 06:00

## 2021-10-30 RX ADMIN — LISINOPRIL 10 MG: 10 TABLET ORAL at 09:22

## 2021-10-30 RX ADMIN — CLOPIDOGREL BISULFATE 75 MG: 75 TABLET ORAL at 09:21

## 2021-10-30 RX ADMIN — ATORVASTATIN CALCIUM 40 MG: 40 TABLET, FILM COATED ORAL at 21:59

## 2021-10-30 NOTE — PROGRESS NOTES
Bedside shift change report given to Shivani Hawk RN (oncoming nurse) by Lakeshia Huber RN (offgoing nurse). Report included the following information SBAR, Kardex, ED Summary, Procedure Summary, Intake/Output, MAR, Accordion, Recent Results, Med Rec Status, Cardiac Rhythm NSR, Alarm Parameters , Pre Procedure Checklist, Procedure Verification, Quality Measures and Dual Neuro Assessment.

## 2021-10-30 NOTE — PROGRESS NOTES
1920 - Bedside shift change report given to Gonsalo Olsen RN (oncoming nurse) by Ruchi Posada RN (offgoing nurse). Report included the following information SBAR, Kardex, ED Summary, Procedure Summary, Intake/Output, MAR, Accordion, Recent Results, Med Rec Status, Cardiac Rhythm NSR, Alarm Parameters , Pre Procedure Checklist, Procedure Verification, Quality Measures and Dual Neuro Assessment. 2101 - Pt. Complained of right chest/ shoulder muscle aching 3/10, Gas issue and feeling anxious. RN administered Tylenol 650 mg, Maalox and Ativan 1mg PO.

## 2021-10-30 NOTE — PROGRESS NOTES
Pt. Ambulated to bathroom and in the grider without difficulty. Pt. Denied SOB, CP, dizziness and palpitation. Right radial cardiac site dressing intact, no bleeding and hematoma. Site care education provided.

## 2021-10-31 VITALS
SYSTOLIC BLOOD PRESSURE: 100 MMHG | WEIGHT: 256 LBS | HEART RATE: 90 BPM | DIASTOLIC BLOOD PRESSURE: 67 MMHG | BODY MASS INDEX: 37.92 KG/M2 | OXYGEN SATURATION: 98 % | RESPIRATION RATE: 19 BRPM | TEMPERATURE: 97.7 F | HEIGHT: 69 IN

## 2021-10-31 LAB
GLUCOSE BLD STRIP.AUTO-MCNC: 188 MG/DL (ref 65–117)
GLUCOSE BLD STRIP.AUTO-MCNC: 331 MG/DL (ref 65–117)
SERVICE CMNT-IMP: ABNORMAL
SERVICE CMNT-IMP: ABNORMAL

## 2021-10-31 PROCEDURE — 82962 GLUCOSE BLOOD TEST: CPT

## 2021-10-31 PROCEDURE — 74011250636 HC RX REV CODE- 250/636: Performed by: INTERNAL MEDICINE

## 2021-10-31 PROCEDURE — 74011636637 HC RX REV CODE- 636/637: Performed by: GENERAL ACUTE CARE HOSPITAL

## 2021-10-31 PROCEDURE — 99218 HC RM OBSERVATION: CPT

## 2021-10-31 PROCEDURE — 77010033678 HC OXYGEN DAILY

## 2021-10-31 PROCEDURE — 74011250637 HC RX REV CODE- 250/637: Performed by: INTERNAL MEDICINE

## 2021-10-31 RX ORDER — ROSUVASTATIN CALCIUM 40 MG/1
40 TABLET, COATED ORAL
Qty: 30 TABLET | Refills: 2 | Status: SHIPPED | OUTPATIENT
Start: 2021-10-31 | End: 2021-11-30

## 2021-10-31 RX ADMIN — LEVOTHYROXINE SODIUM 112 MCG: 0.11 TABLET ORAL at 09:43

## 2021-10-31 RX ADMIN — METOPROLOL TARTRATE 25 MG: 25 TABLET, FILM COATED ORAL at 09:43

## 2021-10-31 RX ADMIN — HUMAN INSULIN 30 UNITS: 100 INJECTION, SOLUTION SUBCUTANEOUS at 11:44

## 2021-10-31 RX ADMIN — INSULIN GLARGINE 90 UNITS: 100 INJECTION, SOLUTION SUBCUTANEOUS at 09:43

## 2021-10-31 RX ADMIN — LISINOPRIL 10 MG: 10 TABLET ORAL at 09:43

## 2021-10-31 RX ADMIN — ASPIRIN 81 MG: 81 TABLET, COATED ORAL at 09:43

## 2021-10-31 RX ADMIN — Medication 10 ML: at 06:38

## 2021-10-31 RX ADMIN — INSULIN LISPRO 3 UNITS: 100 INJECTION, SOLUTION INTRAVENOUS; SUBCUTANEOUS at 07:30

## 2021-10-31 RX ADMIN — HUMAN INSULIN 30 UNITS: 100 INJECTION, SOLUTION SUBCUTANEOUS at 09:44

## 2021-10-31 RX ADMIN — ENOXAPARIN SODIUM 40 MG: 100 INJECTION SUBCUTANEOUS at 09:46

## 2021-10-31 RX ADMIN — CLOPIDOGREL BISULFATE 75 MG: 75 TABLET ORAL at 09:43

## 2021-10-31 RX ADMIN — BUSPIRONE HYDROCHLORIDE 15 MG: 5 TABLET ORAL at 11:44

## 2021-10-31 RX ADMIN — ALUMINUM HYDROXIDE AND MAGNESIUM HYDROXIDE 15 ML: 200; 200 SUSPENSION ORAL at 09:54

## 2021-10-31 NOTE — PROGRESS NOTES
Continues to do well from cardiac standpoint     OK to discharge home today     See me 1 mth     All Daily :   ASA 81 mg   Plavix 75 mg   Metoprolol succinate 25 mg   Lisinopril 20 mg   Rosuvastatin 20 mg .

## 2021-10-31 NOTE — PROGRESS NOTES
Bedside shift change report given to Dawood Brooks RN (oncoming nurse) by Niki Chew RN (offgoing nurse). Report included the following information SBAR, Kardex, ED Summary, Procedure Summary, Intake/Output, MAR, Accordion, Recent Results, Med Rec Status, Cardiac Rhythm NSR, Alarm Parameters , Pre Procedure Checklist, Procedure Verification, Quality Measures and Dual Neuro Assessment.

## 2021-10-31 NOTE — DISCHARGE SUMMARY
Hospitalist Discharge Summary     Patient ID:  Bear Banks  [id number]  15 y.o.  1980  10/27/2021    PCP on record: Isidro Little MD    Admit date: 10/27/2021  Discharge date and time: 10/31/2021    DISCHARGE DIAGNOSIS:  Dizziness, abdominal pain  Concern for ACS  Uncontrolled type I diabetes mellitus  Hypothyroidism  Hypertension  Tobacco use  WALLY    CONSULTATIONS:  IP CONSULT TO CARDIOLOGY    Excerpted HPI from H&P of Yohannes Savage DO:  Naeem Becker is a 42 y.o.  male with past medical history of poorly controlled type I diabetes, hypothyroidism, hypertension, hyperlipidemia, GERD, tobacco use, and anxiety/depression who presents to the ED after experiencing a spell of dizziness and diaphoresis while wrestling with his girlfriend earlier this evening. Symptoms lasted for minutes and resolved with rest.  Denies chest pain but does admit some chest discomfort as well as some nausea. Patient did \"feel hot\" during this episode but otherwise denies fevers or chills. It has been a few days since his last BM which is a little unusual for him. Patient denies EtOH intake or recreational drug use.  ____________________________________________________________________  DISCHARGE SUMMARY/HOSPITAL COURSE:  for full details see H&P, daily progress notes, labs, consult notes. Dizziness, abdominal pain  Concern for ACS  Uncontrolled type I diabetes mellitus  Hypothyroidism  Hypertension  Tobacco use  WALLY  -High-sensitivity troponin negative x2 but uptrending with convincing story for ACS  -Became diaphoretic while \"wrestling\" with his wife earlier this evening also noting dizziness  -cardiac cath done and pt had PCI of Cx Marginal for in stent restenosis   -Continue aspirin and statin  -blood sugar uncontrolled as pt was not getting his home regimen. Resume home insulin regimen: Lantus 90 units BID, pre meal Insulin 30 units TID + ISS.  Pt advised to check finger stick ACHS and adjust insulin dose accordingly  -Counseled on tobacco cessation, admits smoking about a pack per day recently and as above also noted to have uncontrolled diabetes and was counseled on the cardiac risk this poses  -Nicotine patch  -Continue Synthroid for hypothyroidism  -Continue CPAP nightly for his sleep apnea  POC d/w pt, all question/concerns addressed and pt verbalized understanding.      _______________________________________________________________________  Patient seen and examined by me on discharge day. Pertinent Findings:  Gen:    Not in distress  Chest: Clear lungs  CVS:   Regular rhythm. No edema  Abd:  Soft, not distended, not tender  Neuro:  AAOX3  _______________________________________________________________________  DISCHARGE MEDICATIONS:   Current Discharge Medication List      CONTINUE these medications which have CHANGED    Details   rosuvastatin (CRESTOR) 40 mg tablet Take 1 Tablet by mouth nightly for 30 days. Qty: 30 Tablet, Refills: 2  Start date: 10/31/2021, End date: 11/30/2021      clopidogreL (PLAVIX) 75 mg tab Take 1 Tablet by mouth daily. Qty: 30 Tablet, Refills: 12  Start date: 10/29/2021      lisinopriL (PRINIVIL, ZESTRIL) 20 mg tablet Take 1 Tablet by mouth daily. Qty: 30 Tablet, Refills: 12  Start date: 10/29/2021         CONTINUE these medications which have NOT CHANGED    Details   prednisoLONE acetate (PRED FORTE) 1 % ophthalmic suspension       ofloxacin (FLOXIN) 0.3 % ophthalmic solution       busPIRone (BUSPAR) 15 mg tablet Take 1 tablet by mouth twice daily  Qty: 60 Tablet, Refills: 5    Associated Diagnoses: Anxiety; Anxiety disorder due to general medical condition with panic attack;  Adjustment disorder with mixed emotional features      sertraline (ZOLOFT) 100 mg tablet Take 1 tablet by mouth once daily  Qty: 30 Tablet, Refills: 0    Associated Diagnoses: Anxiety      metFORMIN (GLUCOPHAGE) 500 mg tablet Take 1 Tablet by mouth two (2) times daily (with meals). Qty: 180 Tablet, Refills: 3      insulin regular (HumuLIN R Regular U-100 Insuln) 100 unit/mL injection 30 units with breakfast,30 units with lunch and  30 units with dinner plus correction. 160 units per day max  Qty: 50 mL, Refills: 3    Associated Diagnoses: Type 1 diabetes mellitus with diabetic polyneuropathy (HCC)      insulin glargine (Lantus U-100 Insulin) 100 unit/mL injection INJECT 95 UNITS SUBCUTANEOUSLY IN THE MORNING AND 95 UNITS AT NIGHT  Qty: 60 mL, Refills: 1      levothyroxine (SYNTHROID) 112 mcg tablet TAKE 1 TABLET BY MOUTH ONCE DAILY BEFORE BREAKFAST  Qty: 90 Tablet, Refills: 2    Associated Diagnoses: Acquired hypothyroidism      testosterone cypionate (DEPOTESTOTERONE CYPIONATE) 200 mg/mL injection INJECT 1/2 ML (100 MG) INTRAMUSCULARLY EVERY WEEK  Qty: 2 mL, Refills: 4    Associated Diagnoses: Hypogonadism in male      flash glucose sensor (FreeStyle Mikal 2 Sensor) kit Change sensor every 14 days  Qty: 2 Kit, Refills: 3      flash glucose scanning reader (FreeStyle Mikal 2 [place]) misc Change sensor every 14 days  Qty: 1 Each, Refills: 0      cholecalciferol (Vitamin D3) (5000 Units/125 mcg) tab tablet Take 1 Tab by mouth daily. Qty: 90 Tab, Refills: 1    Associated Diagnoses: Vitamin D deficiency      Insulin Syringe-Needle U-100 (BD Insulin Syringe) 1 mL 25 x 1\" syrg Use for drawing up/injecting testosterone once weekly. Qty: 100 Each, Refills: 3      Syringe with Needle, Disp, 1 mL 25 gauge x 5/8\" syrg Use with testosterone once every week  Qty: 50 Syringe, Refills: 3      LORazepam (ATIVAN) 1 mg tablet Take 1 Tab by mouth every four (4) hours as needed for Anxiety. Max Daily Amount: 6 mg. Qty: 30 Tab, Refills: 5    Associated Diagnoses: Anxiety disorder due to general medical condition with panic attack;  Adjustment disorder with mixed emotional features      aluminum & magnesium hydroxide-simethicone (Maalox Maximum Strength) 400-400-40 mg/5 mL suspension Take 10 mL by mouth every six (6) hours as needed for Indigestion. Omeprazole delayed release (PRILOSEC D/R) 20 mg tablet Take 1 Tab by mouth daily. Qty: 20 Tab, Refills: 0      nitroglycerin (NITROSTAT) 0.4 mg SL tablet Take 1 Tab by mouth every five (5) minutes as needed for Chest Pain. Sit down then put one tab under the tongue every 5 minutes as needed  Qty: 1 Bottle, Refills: 0      metoprolol succinate (TOPROL-XL) 25 mg XL tablet Take 1 Tab by mouth daily. Qty: 90 Tab, Refills: 3      aspirin delayed-release 81 mg tablet Take 1 Tab by mouth daily. Qty: 30 Tab, Refills: 0               Patient Follow Up Instructions: Activity: Activity as tolerated  Diet: DIET ONE TIME MESSAGE  ADULT DIET Regular; 4 carb choices (60 gm/meal); Low Fat/Low Chol/High Fiber/2 gm Na  Wound Care: None needed  Follow-up with PCP in  1 week.   Follow-up tests/labs none       Follow-up Information     Follow up With Specialties Details Why Yamil Valadez MD Cardiology Schedule an appointment as soon as possible for a visit in 1 month  [address] [facility]  [address]  [place] [phone]      Yolanda Childress MD Internal Medicine   [address]  [address]  [place] [phone]          ________________________________________________________________    Risk of deterioration: Low    Condition at Discharge:  Stable  __________________________________________________________________    Disposition  Home with family, no needs    ____________________________________________________________________    Code Status: Full Code  ___________________________________________________________________      Total time in minutes spent coordinating this discharge (includes going over instructions, follow-up, prescriptions, and preparing report for sign off to her PCP) :  >30 minutes    Signed:  Radha Kaye MD

## 2021-10-31 NOTE — PROGRESS NOTES
- Bedside shift change report given to Addison Drummond RN (oncoming nurse) by Gisella Ozuna RN (offgoing nurse). Report included the following information SBAR, Kardex, ED Summary, Procedure Summary, Intake/Output, MAR, Accordion, Recent Results, Med Rec Status, Cardiac Rhythm NSR, Alarm Parameters , Pre Procedure Checklist, Procedure Verification, Quality Measures and Dual Neuro Assessment.  - Pt. Reported feeling dizzy (acute on chronic) while using bathroom. B, BP: 107/77, HR: 81 bpm.  Urinal at bedside and instruct pt. To call prior to get out of bed.     - Pt. Finished a sandwich.

## 2021-10-31 NOTE — PROGRESS NOTES
Hospitalist Progress Note    NAME: Neal Tamayo   :  1980   MRN:  [id number]       Assessment / Plan:    Dizziness, abdominal pain  Concern for ACS  Uncontrolled diabetes mellitus  Hypothyroidism  Hypertension  Tobacco use  WALLY     -Admit to observation on telemetry  -High-sensitivity troponin negative x2 but uptrending with convincing story for ACS  -Became diaphoretic while \"wrestling\" with his wife earlier this evening also noting dizziness  -cardiac cath reported PCI of Cx Marginal for in stent restenosis   -Continue aspirin and statin  -resume home insulin regimen: Lantus 90 units BID, pre meal Insulin 30 units TID + ISS  -Counseled on tobacco cessation, admits smoking about a pack per day recently and as above also noted to have uncontrolled diabetes and was counseled on the cardiac risk this poses  -Nicotine patch  -Continue Synthroid for hypothyroidism  -Continue CPAP nightly for his sleep apnea     Anxiety/depression  -Continue buspirone     Code Status: Full  Surrogate Decision Maker: Jared Lee, mother: [phone]  DVT Prophylaxis: Lovenox  GI Prophylaxis: not indicated  Anticipated Discharge Date:  24 hours        Subjective:     Discussed with RN events overnight. Pt denies CP and SOB  Blood glucose significantly elevated and pt on high insulin regimen at home which is resumed today    Review of Systems:  Symptom Y/N Comments  Symptom Y/N Comments   Fever/Chills    Chest Pain     Poor Appetite    Edema     Cough    Abdominal Pain     Sputum    Joint Pain     SOB/COLE    Pruritis/Rash     Nausea/vomit    Tolerating PT/OT     Diarrhea    Tolerating Diet     Constipation    Other       PO intake: No data found.     Wt Readings from Last 10 Encounters:   10/30/21 115.2 kg (253 lb 15.5 oz)   10/20/21 118.4 kg (261 lb)   10/18/21 120.3 kg (265 lb 3.2 oz)   10/18/21 120.2 kg (265 lb) 09/04/21 119.3 kg (263 lb 0.1 oz)   08/26/21 117.9 kg (260 lb)   08/23/21 119.7 kg (264 lb)   08/19/21 119.9 kg (264 lb 5.3 oz)   06/27/21 117.3 kg (258 lb 9.6 oz)   06/16/21 117 kg (258 lb)       Objective:     VITALS:   Last 24hrs VS reviewed since prior progress note. Most recent are:  Patient Vitals for the past 24 hrs:   Temp Pulse Resp BP SpO2   10/30/21 1935 98.1 °F (36.7 °C) 74 16 117/66 96 %   10/30/21 1445 98.3 °F (36.8 °C) 81 17 110/70 97 %   10/30/21 1046 97.4 °F (36.3 °C) 74 14 107/72 95 %   10/30/21 0800 98 °F (36.7 °C) 73 17 (!) 145/56 95 %   10/30/21 0320 98.1 °F (36.7 °C) 78 16 127/63 97 %   10/29/21 2307 98.3 °F (36.8 °C) 77 16 (!) 120/48 97 %       Intake/Output Summary (Last 24 hours) at 10/30/2021 2224  Last data filed at 10/30/2021 0590  Gross per 24 hour   Intake 300 ml   Output 300 ml   Net 0 ml        I had a face to face encounter, and independently examined this patient on 10/30/2021, as outlined below:    PHYSICAL EXAM:  General:    No distress     HEENT: Atraumatic, anicteric sclerae, pink conjunctivae, MMM  Neck:  Supple, symmetrical  Lungs:   CTA. No Wheezing/Rhonchi. No rales. No tenderness. No Accessory muscle use. Heart:   Regular rhythm. No murmur. No JVD   GI/:   Soft. NT. ND. BS normal  Extremities: No edema. No cyanosis. No clubbing. Skin:     Not pale. Not Jaundiced. No rashes   Psych:  Good insight. Not depressed. Not anxious or agitated. Neurologic: Alert and oriented X 4. EOMs intact. No facial asymmetry. No slurred speech. Symmetrical strength, Sensation grossly intact. Labs     I reviewed today's most current labs and imaging studies.   Pertinent labs include:  Recent Labs     10/30/21  0319 10/29/21  0322 10/27/21  2345   WBC 6.6 7.4 8.0   HGB 12.9 13.6 13.1   HCT 38.5 40.6 38.2    182 186     Recent Labs     10/30/21  0319 10/29/21  0322 10/27/21  2345   * 129* 131*   K 4.6 4.6 4.1   CL 95* 95* 99   CO2 28 26 26   * 400* 368*   BUN 20 19 16   CREA 1.20 1.05 1.26   CA 10.0 9.4 9.8   ALB  --   --  3.5   TBILI  --   --  0.4   ALT  --   --  59     CT ABD PELV W CONT    Result Date: 10/28/2021  1. No acute intra-abdominal pathology 2.  17 is inflammation in the anterior abdominal wall with skin thickening. Question cellulitis    XR CHEST PORT    Result Date: 10/28/2021  Cardiomegaly. Diffuse interstitial opacities may represent pulmonary edema versus viral pneumonia     No results found. 11/28/19    ECHO ADULT COMPLETE 11/29/2019 11/29/2019    Interpretation Summary  · Left Ventricle: Normal cavity size, systolic function (ejection fraction normal) and diastolic function. Moderate concentric hypertrophy. Estimated left ventricular ejection fraction is 61 - 65%. · Mitral Valve: Trace mitral valve regurgitation is present.     Signed by: Milford Kawasaki, MD on 11/29/2019 12:26 PM       Current Medications:     Current Facility-Administered Medications:     insulin regular (NOVOLIN R, HUMULIN R) injection 30 Units, 30 Units, SubCUTAneous, TIGuy OMALLEY General Solid, MD, 30 Units at 10/30/21 1651    [START ON 10/31/2021] insulin glargine (LANTUS) injection 90 Units, 90 Units, SubCUTAneous, DAILY **AND** [DISCONTINUED] insulin glargine (LANTUS) injection 90 Units, 90 Units, SubCUTAneous, DAILY, Kassie Tovar MD    insulin lispro (HUMALOG) injection, , SubCUTAneous, AC&HS, General Saran Tovar MD, 10 Units at 10/30/21 1630    glucose chewable tablet 16 g, 4 Tablet, Oral, PRN, General Saran Tovar MD    dextrose (D50W) injection syrg 12.5-25 g, 12.5-25 g, IntraVENous, PRNGuy Haydar M, MD    glucagon (GLUCAGEN) injection 1 mg, 1 mg, IntraMUSCular, PRGuy LIZ General Solid, MD    [Held by provider] 0.9% sodium chloride infusion, 125 mL/hr, IntraVENous, CONTINUOUS, Blayne Reed MD, Last Rate: 125 mL/hr at 10/29/21 1028, 125 mL/hr at 10/29/21 1028    clopidogreL (PLAVIX) tablet 75 mg, 75 mg, Oral, DAILY, Jonathan Reed MD BISI, 75 mg at 10/30/21 9177    sodium chloride (NS) flush 5-40 mL, 5-40 mL, IntraVENous, Q8H, Valeria Reed MD, 10 mL at 10/30/21 2159    sodium chloride (NS) flush 5-40 mL, 5-40 mL, IntraVENous, PRN, Valeria Reed MD    LORazepam (ATIVAN) tablet 1 mg, 1 mg, Oral, BID PRN, Mary Hamilton MD, 1 mg at 10/29/21 2101    zolpidem (AMBIEN) tablet 5 mg, 5 mg, Oral, QHS PRN, Mary Hamilton MD    metoprolol tartrate (LOPRESSOR) tablet 25 mg, 25 mg, Oral, BID, Valeria Reed MD, 25 mg at 10/30/21 1700    aluminum-magnesium hydroxide (MAALOX) oral suspension 15 mL, 15 mL, Oral, QID PRN, Mary Hamilton MD, 15 mL at 10/30/21 1650    polyethylene glycol (MIRALAX) packet 17 g, 17 g, Oral, DAILY PRN, Valeria Reed MD    ondansetron (ZOFRAN ODT) tablet 4 mg, 4 mg, Oral, Q8H PRN **OR** ondansetron (ZOFRAN) injection 4 mg, 4 mg, IntraVENous, Q6H PRN, Valeria Reed MD    enoxaparin (LOVENOX) injection 40 mg, 40 mg, SubCUTAneous, DAILY, Valeria Reed MD, 40 mg at 10/30/21 5096    busPIRone (BUSPAR) tablet 15 mg, 15 mg, Oral, BID, Jonathan Reed MD, 15 mg at 10/30/21 173    aspirin delayed-release tablet 81 mg, 81 mg, Oral, DAILY, Valeria Reed MD, 81 mg at 10/30/21 2485    levothyroxine (SYNTHROID) tablet 112 mcg, 112 mcg, Oral, ACB, Valeria Reed MD, 112 mcg at 10/30/21 1317    lisinopriL (PRINIVIL, ZESTRIL) tablet 10 mg, 10 mg, Oral, BID, Jonathan Reed MD, 10 mg at 10/30/21 1651    acetaminophen (TYLENOL) tablet 650 mg, 650 mg, Oral, Q6H PRN, Valeria Reed MD, 650 mg at 10/30/21 1937    atorvastatin (LIPITOR) tablet 40 mg, 40 mg, Oral, QHS, Valeria Reed MD, 40 mg at 10/30/21 5538     Procedures: see electronic medical records for all procedures/Xrays and details which were not copied into this note but were reviewed prior to creation of Plan.     Reviewed most current lab test results and cultures  YES  Reviewed most current radiology test results   YES  Review and summation of old records today    NO  Reviewed patient's current orders and MAR    YES  PMH/SH reviewed - no change compared to H&P  ________________________________________________________________________  Care Plan discussed with:    Comments   Patient x    Family      RN x    Care Manager     Consultant                        Multidiciplinary team rounds were held today with , nursing, pharmacist and clinical coordinator. Patient's plan of care was discussed; medications were reviewed and discharge planning was addressed.      ________________________________________________________________________  Total NON critical care TIME:   35  Minutes    Total CRITICAL CARE TIME Spent:   Minutes non procedure based      Comments   >50% of visit spent in counseling and coordination of care x     This includes time during multidisciplinary rounds if indicated above   ________________________________________________________________________  Lexy Daniel MD

## 2021-10-31 NOTE — PROGRESS NOTES
D/C Plan:    -d/c home  -PCP & speciality follow up        Scheduled to discharge today. Aware to follow up w/PCP and specialists. Patient desires to drive himself home, assigned RN is aware and will check w/discharging physician. If patient isn't able to drive himself, he has access to transportation. Clear to discharge from SW standpoint.          JOSE MIGUEL Kumar

## 2021-10-31 NOTE — PROGRESS NOTES
Discharge instructions reviewed with patient. Allowed adequate time to ask questions, all questions answered. Printed copy of AVS given to patient. All belongings gathered, IV and tele discontinued. Pt waiting for ride home. Liam Mullins

## 2021-10-31 NOTE — DISCHARGE INSTRUCTIONS
Patient Education        Heart Attack: Care Instructions  Overview     A heart attack is an event that occurs when part of the heart muscle does not get enough blood and oxygen. This part of the heart starts to die. A heart attack is also called a myocardial infarction, or MI. A heart attack most often happens because blood flow through one or more of the coronary arteries is blocked. This blockage is usually caused by a blood clot that forms when plaque in the artery breaks open. After a heart attack, you may be worried about your future. Over the next several weeks, your heart will start to heal. Though it can be hard to break old habits, you can reduce your risk of having another heart attack. You can do this by making some lifestyle changes and by taking medicines. Follow-up care is a key part of your treatment and safety. Be sure to make and go to all appointments, and call your doctor if you are having problems. It's also a good idea to know your test results and keep a list of the medicines you take. How can you care for yourself at home? Activity    · Until your doctor says it is okay, do not do strenuous exercise. And do not lift, pull, or push anything heavy. Ask your doctor what types of activities are safe for you.     · If your doctor has not set you up with a cardiac rehabilitation (rehab) program, talk to him or her about whether that is right for you. Cardiac rehab includes supervised exercise. It also includes help with diet and lifestyle changes and emotional support. It may reduce your risk of future heart problems.     · Increase your activities slowly. Take short rest breaks when you get tired.     · If your doctor recommends it, get more exercise. Walking is a good choice. Bit by bit, increase the amount you walk every day. Try for at least 30 minutes on most days of the week. You also may want to swim, bike, or do other activities.  Talk with your doctor before you start an exercise program to make sure it is safe for you.     · Ask your doctor when you can drive, go back to work, and do other daily activities again.     · You can have sex as soon as you feel ready for it. Often this means when you can easily walk around or climb stairs. Talk with your doctor if you have any concerns. If you are taking nitroglycerin, do not take erection-enhancing medicine such as sildenafil (Viagra), tadalafil (Cialis), or vardenafil (Levitra) . Lifestyle changes    · Do not smoke. Smoking increases your risk of another heart attack. If you need help quitting, talk to your doctor about stop-smoking programs and medicines. These can increase your chances of quitting for good.     · Eat a heart-healthy diet that is low in saturated fat and salt, and is full of fruits, vegetables and whole-grains. You may get more details about how to eat healthy. But these tips can help you get started.     · Stay at a healthy weight, or lose weight if you need to. Medicines    · Be safe with medicines. Take your medicines exactly as prescribed. Call your doctor if you think you are having a problem with your medicine. You will get more details on the specific medicines your doctor prescribes. Do not stop taking your medicine unless your doctor tells you to. Not taking your medicine might raise your risk of having another heart attack.     · You may need several medicines to help lower your risk of another heart attack. These include:  ? Blood pressure medicines such as angiotensin-converting enzyme (ACE) inhibitors, ARBs (angiotensin II receptor blockers), and beta-blockers. ? Cholesterol medicine called statins. ? Aspirin and other blood thinners. These prevent blood clots that can cause a heart attack.     · If your doctor has given you nitroglycerin, keep it with you at all times. If you have angina symptoms, such as chest pain or pressure, sit down and rest. Take the first dose of nitroglycerin as directed.  If symptoms get worse or are not getting better within 5 minutes, call 911 right away. Stay on the phone. The emergency  will tell you what to do.     · Do not take any over-the-counter medicines, vitamins, or herbal products without talking to your doctor first.   Staying healthy    · Manage other health conditions such as high blood pressure and diabetes.     · Avoid colds and flu. Get a pneumococcal vaccine shot. If you have had one before, ask your doctor whether you need another dose. Get the flu vaccine every year. If you must be around people with colds or flu, wash your hands often.     · Be sure to tell your doctor about any angina symptoms you have had, even if they went away. Pay attention to your angina symptoms. Know what is typical for you and learn how to control it. Know when to call for help.     · Talk to your family, friends, or a counselor about your feelings. It is normal to feel frightened, angry, hopeless, helpless, and even guilty. Talking openly about bad feelings can help you cope. If you have symptoms of depression, talk to your doctor. When should you call for help? Call 911 anytime you think you may need emergency care. For example, call if:    · You have symptoms of a heart attack. These may include:  ? Chest pain or pressure, or a strange feeling in the chest.  ? Sweating. ? Shortness of breath. ? Nausea or vomiting. ? Pain, pressure, or a strange feeling in the back, neck, jaw, or upper belly or in one or both shoulders or arms. ? Lightheadedness or sudden weakness. ? A fast or irregular heartbeat. After you call 911, the  may tell you to chew 1 adult-strength or 2 to 4 low-dose aspirin. Wait for an ambulance. Do not try to drive yourself.     · You have angina symptoms (such as chest pain or pressure) that do not go away with rest or are not getting better within 5 minutes after you take a dose of nitroglycerin.     · You passed out (lost consciousness).      · You feel like you are having another heart attack. Call your doctor now or seek immediate medical care if:    · You are having angina symptoms, such as chest pain or pressure, more often than usual, or the symptoms are different or worse than usual.     · You have new or increased shortness of breath.     · You are dizzy or lightheaded, or you feel like you may faint. Watch closely for changes in your health, and be sure to contact your doctor if you have any problems. Where can you learn more? Go to http://www.gray.com/  Enter H564 in the search box to learn more about \"Heart Attack: Care Instructions. \"  Current as of: April 29, 2021               Content Version: 13.0  © 2006-2021 [employer]. Care instructions adapted under license by [employer] (which disclaims liability or warranty for this information). If you have questions about a medical condition or this instruction, always ask your healthcare professional. [place] [address] any warranty or liability for your use of this information. Patient Education   Patient Education        Learning About Type 2 Diabetes  What is type 2 diabetes? Type 2 diabetes is a condition in which you have too much sugar (glucose) in your blood. Glucose is a type of sugar produced in your body when carbohydrates and other foods are digested. It provides energy to cells throughout the body. Normally, blood sugar levels increase after you eat a meal. When blood sugar rises, cells in the pancreas release insulin, which causes the body to absorb sugar from the blood and lowers the blood sugar level to normal.  When you have type 2 diabetes, sugar stays in the blood rather than entering the body's cells to be used for energy. This results in high blood sugar. It happens when your body can't use insulin the right way.   Over time, high blood sugar can harm many parts of the body, such as your eyes, heart, blood vessels, nerves, and kidneys. It can also increase your risk for other health problems (complications). What can you expect with type 2 diabetes? Raúl Covington keep hearing about how important it is to keep your blood sugar within a target range. That's because over time, high blood sugar can lead to serious problems. It can:  · Harm your eyes, nerves, and kidneys. · Damage your blood vessels, leading to heart disease and stroke. · Reduce blood flow and cause nerve damage to parts of your body, especially your feet. This can cause slow healing and pain when you walk. · Make your immune system weak and less able to fight infections. When people hear the word \"diabetes,\" they often think of problems like these. But daily care and treatment can help prevent or delay these problems. The goal is to keep your blood sugar in a target range. That's the best way to reduce your chance of having more problems from diabetes. What are the symptoms? Some people who have type 2 diabetes may not have any symptoms early on. Many people with the disease don't even know they have it at first. But with time, diabetes starts to cause symptoms. You have most symptoms of type 2 diabetes when your blood sugar is either too high or too low. The most common symptoms of high blood sugar include:  · Thirst.  · Needing to urinate often. · Weight loss. · Blurry vision. The symptoms of low blood sugar include:  · Sweating. · Shakiness. · Weakness. · Hunger. · Confusion. You're not likely to get symptoms of low blood sugar unless you take insulin or use certain diabetes medicines that lower blood sugar. How can you help prevent type 2 diabetes? There are things you can do to help prevent type 2 diabetes. Stay at a healthy weight. Exercise regularly, and eat healthy foods. Even small changes can make a difference. If you have prediabetes, the medicine metformin can help prevent type 2 diabetes.   How is type 2 diabetes treated? Treatment for type 2 diabetes will change over time to meet your needs. But the focus of your treatment will usually be to keep your blood sugar levels in your target range. This will help prevent problems such as eye, kidney, heart, blood vessel, and nerve disease. Some people may need medicines to help their bodies make insulin or decrease insulin resistance. Some medicines slow down how quickly the body absorbs carbohydrates. Treatment to manage type 2 diabetes includes:  · Making healthy food choices and being active. · Losing weight, if you need to. · Seeing your doctor regularly. · Keeping your blood sugar in your target range. · Taking medicines, if you need them. · Quitting smoking, if you smoke. · Keeping your blood pressure and cholesterol under control. Follow-up care is a key part of your treatment and safety. Be sure to make and go to all appointments, and call your doctor if you are having problems. It's also a good idea to know your test results and keep a list of the medicines you take. Where can you learn more? Go to http://www.gray.com/  Enter [id number] in the search box to learn more about \"Learning About Type 2 Diabetes. \"  Current as of: August 31, 2020               Content Version: 13.0  © 4010-0472 [employer]. Care instructions adapted under license by [employer] (which disclaims liability or warranty for this information). If you have questions about a medical condition or this instruction, always ask your healthcare professional. [place] [address] any warranty or liability for your use of this information. Learning About Low Blood Sugar (Hypoglycemia) in Diabetes  What is low blood sugar (hypoglycemia)? Hypoglycemia means that your blood sugar is low and your body (especially your brain) is not getting enough fuel.  If you have diabetes, your blood sugar can go too low if you take too much of some diabetes medicines. It can also go too low if you miss a meal. And it can happen if you exercise too hard without eating enough food. Some medicines used to treat other health problems can cause low blood sugar too. What are the symptoms? Symptoms of low blood sugar can start quickly. It may take just 10 to 15 minutes. If you have had diabetes for many years, you may not realize that your blood sugar is low until it drops very low. · If your blood sugar level drops below 70 (mild low blood sugar), you may feel tired, anxious, dizzy, weak, shaky, or sweaty. You may have a fast heartbeat or blurry vision. · If your blood sugar level continues to drop, your behavior may change. You may feel more irritable. You may find it hard to concentrate or talk. And you may feel unsteady when you stand or walk. You may become too weak or confused to eat something with sugar to raise your blood sugar level. · If your blood sugar level drops very low, you may pass out (lose consciousness). Or you may have a seizure or stroke. If you have symptoms of severe low blood sugar, you need to get medical care right away. If you had a low blood sugar level during the night, you may wake up tired or with a headache. Or you may sweat so much during the night that your pajamas or sheets are damp when you wake up. How is low blood sugar treated? You can treat low blood sugar by eating or drinking something that has 15 grams of carbohydrate. These should be quick-sugar foods. Check your blood sugar level again 15 minutes after having a quick-sugar food to make sure your level is getting back to your target range. Children usually need less than 15 grams of carbohydrate. Check with your doctor or diabetes educator for the amount that is right for your child.   Here are examples of quick-sugar foods that have 15 grams of carbohydrate:  · 3 to 4 glucose tablets  · 1 tablespoon (3 teaspoons) of table sugar  · 1 tablespoon (3 teaspoons) honey  · ½ cup to ¾ cup (4 to 6 ounces) of fruit juice or regular (not diet) soda  · Hard candy (such as 6 Life Savers)  If you have problems with severe low blood sugar, someone else may have to give you glucagon. This is a hormone that raises blood sugar levels quickly. How can you prevent low blood sugar? You can take steps to prevent low blood sugar. · Follow your treatment plan. Take your insulin or other diabetes medicine exactly as your doctor prescribed it. Talk with your doctor if you're having low blood sugar often. Your medicine may need to be adjusted if it's causing your low blood sugar. · Check your blood sugar levels often. This helps you find early changes before an emergency happens. · Keep a quick-sugar food with you in case your blood sugar level drops low. · Eat small meals more often so that you don't get too hungry between meals. Don't skip meals. · Balance extra exercise with eating more. Check your blood sugar and learn how it changes after exercise. If your blood sugar stays at a normal level, you may not need to eat after you exercise. · Limit how much alcohol you drink. Alcohol can make low blood sugar go even lower. Don't drink alcohol if you have problems recognizing the early signs of low blood sugar. · Keep a diary of your symptoms. This helps you learn when changes in your body may signal low blood sugar. And keep track of how often you have low blood sugar, including when you last ate and what you ate. This will help you learn what causes your blood sugar to drop. · Learn about diabetes and low blood sugar. Support groups or a diabetes education center can help you understand how medicines, diet, and exercise affect your blood sugar levels. Since low blood sugar levels can quickly become an emergency, be sure to wear medical alert jewelry, such as a medical alert bracelet. This is to let people know you have diabetes so they can get help for you.  You can buy this at most drugstores. And make sure your family, friends, and coworkers know the symptoms of low blood sugar. Teach them what to do to get your sugar level up. Follow-up care is a key part of your treatment and safety. Be sure to make and go to all appointments, and call your doctor if you are having problems. It's also a good idea to know your test results and keep a list of the medicines you take. Where can you learn more? Go to http://www.gray.com/  Enter T511 in the search box to learn more about \"Learning About Low Blood Sugar (Hypoglycemia) in Diabetes. \"  Current as of: August 31, 2020               Content Version: 13.0  © 2006-2021 Healthwise, Incorporated. Care instructions adapted under license by [employer] (which disclaims liability or warranty for this information). If you have questions about a medical condition or this instruction, always ask your healthcare professional. [address] any warranty or liability for your use of this information.

## 2021-11-01 ENCOUNTER — TRANSCRIBE ORDER (OUTPATIENT)
Dept: CARDIAC REHAB | Age: 41
End: 2021-11-01

## 2021-11-01 DIAGNOSIS — Z95.5 STENTED CORONARY ARTERY: Primary | ICD-10-CM

## 2021-11-01 NOTE — CARDIO/PULMONARY
Cardiac Rehab Note: chart review/referral     CP, cardia cath with intervention 10/29/21    Smoking history assessed. Patient is a former smoker. Smoking Cessation Program link has not been added to the AVS.     CAD education folder, with heart heathy diet, warning signs, heart facts, catheterization brochure, and out patient cardiac rehab program mailed to Lexi Johnson. Patient materials mailed, cp rehab will f/u with phone call.

## 2021-11-04 ENCOUNTER — TELEPHONE (OUTPATIENT)
Dept: ENDOCRINOLOGY | Age: 41
End: 2021-11-04

## 2021-11-04 NOTE — TELEPHONE ENCOUNTER
Spoke with patient. He states that he was hospitalized recently at [address] [facility] and had another heart stent placed. He states that he has been taking 20-30 units of Humulin R and 90 units of Lantus twice a day. His blood sugars are running from the 50s up to 240 (the highest). This am his blood sugar was 76. He felt sweaty. He ate candy and Cheeezits and then a small bowl of sweetened cereal. He has not rechecked his blood sugar. Last night his blood sugar was in the 100s. He ate steak for dinner and did not take any insulin (Humulin R). He said he is trying to work on his eating habits. (He stated that he just wanted to let Eliane Cardenas know about his recent health update).

## 2021-11-20 ENCOUNTER — TELEPHONE (OUTPATIENT)
Dept: ENDOCRINOLOGY | Age: 41
End: 2021-11-20

## 2021-11-20 RX ORDER — INSULIN GLARGINE 100 [IU]/ML
INJECTION, SOLUTION SUBCUTANEOUS
Qty: 60 ML | Refills: 0 | OUTPATIENT
Start: 2021-11-20 | End: 2021-12-20

## 2021-11-20 NOTE — TELEPHONE ENCOUNTER
Received a message from Judith Beard, [facility] that [facility] and needed his lantus refilled as he was all out and apparently a fax was sent this week for a refill and was never received. I gave a verbal approval for 6 vials with no refills and future refills can go to Dr. Candy Saab.

## 2021-12-01 ENCOUNTER — APPOINTMENT (OUTPATIENT)
Dept: GENERAL RADIOLOGY | Age: 41
End: 2021-12-01
Attending: PHYSICIAN ASSISTANT
Payer: COMMERCIAL

## 2021-12-01 ENCOUNTER — HOSPITAL ENCOUNTER (EMERGENCY)
Age: 41
Discharge: HOME | End: 2021-12-01
Attending: EMERGENCY MEDICINE
Payer: COMMERCIAL

## 2021-12-01 VITALS
WEIGHT: 264.99 LBS | HEART RATE: 78 BPM | TEMPERATURE: 98.7 F | RESPIRATION RATE: 14 BRPM | OXYGEN SATURATION: 100 % | BODY MASS INDEX: 39.25 KG/M2 | DIASTOLIC BLOOD PRESSURE: 70 MMHG | SYSTOLIC BLOOD PRESSURE: 142 MMHG | HEIGHT: 69 IN

## 2021-12-01 DIAGNOSIS — M54.2 ACUTE NECK PAIN: ICD-10-CM

## 2021-12-01 DIAGNOSIS — M54.50 ACUTE LEFT-SIDED LOW BACK PAIN WITHOUT SCIATICA: Primary | ICD-10-CM

## 2021-12-01 LAB
APPEARANCE UR: CLEAR
BACTERIA URNS QL MICRO: NEGATIVE /HPF
BILIRUB UR QL: NEGATIVE
COLOR UR: ABNORMAL
EPITH CASTS URNS QL MICRO: ABNORMAL /LPF
GLUCOSE UR STRIP.AUTO-MCNC: 100 MG/DL
HGB UR QL STRIP: NEGATIVE
HYALINE CASTS URNS QL MICRO: ABNORMAL /LPF (ref 0–5)
KETONES UR QL STRIP.AUTO: NEGATIVE MG/DL
LEUKOCYTE ESTERASE UR QL STRIP.AUTO: NEGATIVE
NITRITE UR QL STRIP.AUTO: NEGATIVE
PH UR STRIP: 5.5 [PH] (ref 5–8)
PROT UR STRIP-MCNC: NEGATIVE MG/DL
RBC #/AREA URNS HPF: ABNORMAL /HPF (ref 0–5)
SP GR UR REFRACTOMETRY: 1.01 (ref 1–1.03)
UA: UC IF INDICATED,UAUC: ABNORMAL
UROBILINOGEN UR QL STRIP.AUTO: 1 EU/DL (ref 0.2–1)
WBC URNS QL MICRO: ABNORMAL /HPF (ref 0–4)

## 2021-12-01 PROCEDURE — 72100 X-RAY EXAM L-S SPINE 2/3 VWS: CPT

## 2021-12-01 PROCEDURE — 81001 URINALYSIS AUTO W/SCOPE: CPT

## 2021-12-01 PROCEDURE — 99282 EMERGENCY DEPT VISIT SF MDM: CPT

## 2021-12-01 RX ORDER — HYDROCODONE BITARTRATE AND ACETAMINOPHEN 5; 325 MG/1; MG/1
1 TABLET ORAL
Qty: 15 TABLET | Refills: 0 | Status: SHIPPED | OUTPATIENT
Start: 2021-12-01 | End: 2021-12-06

## 2021-12-01 NOTE — ED PROVIDER NOTES
EMERGENCY DEPARTMENT HISTORY AND PHYSICAL EXAM      Date: 12/1/2021  Patient Name: Love Oreilly    History of Presenting Illness     Chief Complaint   Patient presents with    Back Pain     upper back pain ongoing; but here today for lower back pain with radiating to left lower; no difficulty of urination. no known injury. feels like someone putting a fist in to it for a week. History Provided By: Patient    HPI: Love Oreilly, 42 y.o. male with history of diabetes, hypertension, CAD currently on Plavix and others as below presents ambulatory to the ED with cc of about a week of 7 out of 10 constant, achy low back pain that does radiate slightly around the flank. He denies any specific injury. He denies any nausea or vomiting. He tells me he has frequent loose stools because of a gallbladder issue that is unchanged. There has been no constipation. Pain is worse with movement and palpation. He tells me he also has a history of a bulging disc in his neck and his neck is been bothering him as well. He denies any numbness or weakness. He tells me he is currently followed by [facility]. He has been well lately without fever. There has been no chest pain or shortness of breath. He denies saddle anesthesia. He denies any urinary symptoms such as dysuria, urgency or frequency. There are no other complaints, changes, or physical findings at this time. PCP: Madhav Levy MD    Current Outpatient Medications   Medication Sig Dispense Refill    HYDROcodone-acetaminophen (NORCO) 5-325 mg per tablet Take 1 Tablet by mouth every six (6) hours as needed for Pain for up to 5 days. Max Daily Amount: 4 Tablets.  15 Tablet 0    insulin glargine (Lantus U-100 Insulin) 100 unit/mL injection INJECT 95 UNITS SUBCUTANEOUSLY IN THE MORNING AND 95 UNITS AT NIGHT 60 mL 0    sertraline (ZOLOFT) 100 mg tablet Take 1 tablet by mouth once daily 30 Tablet 4    clopidogreL (PLAVIX) 75 mg tab Take 1 Tablet by mouth daily. 30 Tablet 12    lisinopriL (PRINIVIL, ZESTRIL) 20 mg tablet Take 1 Tablet by mouth daily. 30 Tablet 12    prednisoLONE acetate (PRED FORTE) 1 % ophthalmic suspension       ofloxacin (FLOXIN) 0.3 % ophthalmic solution       busPIRone (BUSPAR) 15 mg tablet Take 1 tablet by mouth twice daily 60 Tablet 5    metFORMIN (GLUCOPHAGE) 500 mg tablet Take 1 Tablet by mouth two (2) times daily (with meals). 180 Tablet 3    insulin regular (HumuLIN R Regular U-100 Insuln) 100 unit/mL injection 30 units with breakfast,30 units with lunch and  30 units with dinner plus correction. 160 units per day max (Patient taking differently: 20 units with breakfast, 20 units with lunch and  20 units with dinner plus correction. 160 units per day max) 50 mL 3    levothyroxine (SYNTHROID) 112 mcg tablet TAKE 1 TABLET BY MOUTH ONCE DAILY BEFORE BREAKFAST 90 Tablet 2    testosterone cypionate (DEPOTESTOTERONE CYPIONATE) 200 mg/mL injection INJECT 1/2 ML (100 MG) INTRAMUSCULARLY EVERY WEEK (Patient not taking: Reported on 10/18/2021) 2 mL 4    flash glucose sensor (FreeStyle Mikal 2 Sensor) kit Change sensor every 14 days 2 Kit 3    flash glucose scanning reader (FreeStyle Mikal 2 [place]) misc Change sensor every 14 days 1 Each 0    cholecalciferol (Vitamin D3) (5000 Units/125 mcg) tab tablet Take 1 Tab by mouth daily. 90 Tab 1    Insulin Syringe-Needle U-100 (BD Insulin Syringe) 1 mL 25 x 1\" syrg Use for drawing up/injecting testosterone once weekly. 100 Each 3    Syringe with Needle, Disp, 1 mL 25 gauge x 5/8\" syrg Use with testosterone once every week 50 Syringe 3    LORazepam (ATIVAN) 1 mg tablet Take 1 Tab by mouth every four (4) hours as needed for Anxiety. Max Daily Amount: 6 mg. 30 Tab 5    aluminum & magnesium hydroxide-simethicone (Maalox Maximum Strength) 400-400-40 mg/5 mL suspension Take 10 mL by mouth every six (6) hours as needed for Indigestion.       Omeprazole delayed release (PRILOSEC D/R) 20 mg tablet Take 1 Tab by mouth daily. 20 Tab 0    nitroglycerin (NITROSTAT) 0.4 mg SL tablet Take 1 Tab by mouth every five (5) minutes as needed for Chest Pain. Sit down then put one tab under the tongue every 5 minutes as needed 1 Bottle 0    metoprolol succinate (TOPROL-XL) 25 mg XL tablet Take 1 Tab by mouth daily. (Patient taking differently: Take 25 mg by mouth two (2) times a day.) 90 Tab 3    aspirin delayed-release 81 mg tablet Take 1 Tab by mouth daily.  27 Tab 0     Past History     Past Medical History:  Past Medical History:   Diagnosis Date    Anxiety and depression     Bleeding of eye, left     8/17/21 pt reports receiving injections in right eye for beeding    Chronic headaches 2007    COVID-19 12/20/2020    Diabetes type 1, uncontrolled ([address]     since 8years old    GERD (gastroesophageal reflux disease)     Hypercholesterolemia     Hypertension     Hypothyroidism     MI (myocardial infarction) ([address]     as of 8/17/21 pt reports 8 stents total    WALLY on CPAP        Past Surgical History:  Past Surgical History:   Procedure Laterality Date    HX CARPAL TUNNEL RELEASE Left 2012    HX CARPAL TUNNEL RELEASE Right 2018    CTE trigger thumb and index finger    HX HEART CATHETERIZATION      HX LAP CHOLECYSTECTOMY  8/26/14    Dr Hollie Baker    HX WISDOM TEETH EXTRACTION         Family History:  Family History   Problem Relation Age of Onset    Diabetes Mother     Heart Disease Father     Cancer Father     Diabetes Father     Diabetes Paternal Aunt     Diabetes Maternal Grandmother     Thyroid Cancer Maternal Grandmother     Kidney Disease Maternal Grandmother     Arthritis Maternal Grandmother     Heart Disease Maternal Grandfather     High Cholesterol Maternal Grandfather     Hypertension Maternal Grandfather     Diabetes Paternal Grandmother     Hemophilia Paternal Grandfather        Social History:  Social History     Tobacco Use    Smoking status: Former Smoker     Packs/day: 0.00     Years: 14.00     Pack years: 0.00     Quit date: 2014     Years since quittin.9    Smokeless tobacco: Never Used   Substance Use Topics    Alcohol use: No    Drug use: No       Allergies: Allergies   Allergen Reactions    Morphine Nausea and Vomiting    Penicillin G Swelling    Percocet [Oxycodone-Acetaminophen] Hives and Nausea and Vomiting     Pt is allergic to Oxycodone, has previously tolerated Tylenol and Hydrocodone.  Sulfa (Sulfonamide Antibiotics) Swelling    Tramadol Nausea Only     Nausea and headache       Review of Systems   Review of Systems   Constitutional: Negative for fatigue and fever. HENT: Negative for congestion, ear pain and rhinorrhea. Eyes: Negative for pain and redness. Respiratory: Negative for cough and wheezing. Cardiovascular: Negative for chest pain and palpitations. Gastrointestinal: Negative for abdominal pain, nausea and vomiting. Genitourinary: Negative for dysuria, frequency and urgency. Musculoskeletal: Positive for back pain and neck pain. Negative for neck stiffness. Skin: Negative for rash and wound. Neurological: Negative for weakness, light-headedness, numbness and headaches. Physical Exam   Physical Exam  Vitals and nursing note reviewed. Constitutional:       General: He is not in acute distress. Appearance: He is well-developed. He is not toxic-appearing. HENT:      Head: Normocephalic and atraumatic. No right periorbital erythema or left periorbital erythema. Jaw: No trismus. Right Ear: External ear normal.      Left Ear: External ear normal.      Nose: Nose normal.      Mouth/Throat:      Pharynx: Uvula midline. Eyes:      General: No scleral icterus. Conjunctiva/sclera: Conjunctivae normal.      Pupils: Pupils are equal, round, and reactive to light. Cardiovascular:      Rate and Rhythm: Normal rate and regular rhythm. Heart sounds: Normal heart sounds.    Pulmonary:      Effort: Pulmonary effort is normal. No tachypnea, accessory muscle usage or respiratory distress. Breath sounds: Normal breath sounds. No decreased breath sounds or wheezing. Abdominal:      Palpations: Abdomen is soft. Abdomen is not rigid. Tenderness: There is no abdominal tenderness. There is no guarding. Comments:   Obese  Soft  Nontender   Musculoskeletal:         General: Normal range of motion. Cervical back: Full passive range of motion without pain and normal range of motion. Back:       Comments:   LOWER BACK:  No bruising, redness or swelling. No step off. Diffuse muscular discomfort. No CVA tenderness   Skin:     Findings: No rash. Neurological:      Mental Status: He is alert and oriented to person, place, and time. He is not disoriented. GCS: GCS eye subscore is 4. GCS verbal subscore is 5. GCS motor subscore is 6. Cranial Nerves: No cranial nerve deficit. Sensory: No sensory deficit. Psychiatric:         Speech: Speech normal.       Diagnostic Study Results     Labs -     Recent Results (from the past 12 hour(s))   URINALYSIS W/ REFLEX CULTURE    Collection Time: 12/01/21  2:36 PM    Specimen: Urine   Result Value Ref Range    Color YELLOW/STRAW      Appearance CLEAR CLEAR      Specific gravity 1.010 1.003 - 1.030      pH (UA) 5.5 5.0 - 8.0      Protein Negative NEG mg/dL    Glucose 100 (A) NEG mg/dL    Ketone Negative NEG mg/dL    Bilirubin Negative NEG      Blood Negative NEG      Urobilinogen 1.0 0.2 - 1.0 EU/dL    Nitrites Negative NEG      Leukocyte Esterase Negative NEG      WBC 0-4 0 - 4 /hpf    RBC 0-5 0 - 5 /hpf    Epithelial cells FEW FEW /lpf    Bacteria Negative NEG /hpf    UA:UC IF INDICATED CULTURE NOT INDICATED BY UA RESULT CNI      Hyaline cast 0-2 0 - 5 /lpf       Radiologic Studies -   XR SPINE LUMB 2 OR 3 V   Final Result   No acute bony abnormality of the lumbar spine is confirmed. See   discussion above.            CT Results  (Last 48 hours) None        CXR Results  (Last 48 hours)    None        Medical Decision Making   I am the first provider for this patient. I reviewed the vital signs, available nursing notes, past medical history, past surgical history, family history and social history. Vital Signs-Reviewed the patient's vital signs. Patient Vitals for the past 12 hrs:   Temp Pulse Resp BP SpO2   12/01/21 1426 98.7 °F (37.1 °C) 78 14 (!) 142/70 100 %       Pulse Oximetry Analysis - 100% on RA    Records Reviewed: Nursing Notes, Old Medical Records, Previous Radiology Studies, Previous Laboratory Studies and  and KARLEE    Provider Notes (Medical Decision Making):   DDx: Compression fracture, HNP, DDD, strain    Afebrile and well-appearing. Patient ambulates independently without a limp or list.  UA is without blood or evidence of infection. Plain films of the lumbar spine are negative for acute process. Regarding the neck pain, this is a chronic problem and there has been no new injury. Additional testing deferred. Will offer medication and refer to Ortho if symptoms persist.  Return precautions for any concerns. ED Course:   Initial assessment performed. The patients presenting problems have been discussed, and they are in agreement with the care plan formulated and outlined with them. I have encouraged them to ask questions as they arise throughout their visit. Disposition:  Discharge    PLAN:  1. Discharge Medication List as of 12/1/2021  4:35 PM      START taking these medications    Details   HYDROcodone-acetaminophen (NORCO) 5-325 mg per tablet Take 1 Tablet by mouth every six (6) hours as needed for Pain for up to 5 days.  Max Daily Amount: 4 Tablets., Normal, Disp-15 Tablet, R-0         CONTINUE these medications which have NOT CHANGED    Details   insulin glargine (Lantus U-100 Insulin) 100 unit/mL injection INJECT 95 UNITS SUBCUTANEOUSLY IN THE MORNING AND 95 UNITS AT NIGHT, Phone In, Disp-60 mL, R-0 sertraline (ZOLOFT) 100 mg tablet Take 1 tablet by mouth once daily, Normal, Disp-30 Tablet, R-4      clopidogreL (PLAVIX) 75 mg tab Take 1 Tablet by mouth daily. , Normal, Disp-30 Tablet, R-12      lisinopriL (PRINIVIL, ZESTRIL) 20 mg tablet Take 1 Tablet by mouth daily. , Normal, Disp-30 Tablet, R-12      prednisoLONE acetate (PRED FORTE) 1 % ophthalmic suspension Historical Med      ofloxacin (FLOXIN) 0.3 % ophthalmic solution Historical Med      busPIRone (BUSPAR) 15 mg tablet Take 1 tablet by mouth twice daily, Normal, Disp-60 Tablet, R-5      metFORMIN (GLUCOPHAGE) 500 mg tablet Take 1 Tablet by mouth two (2) times daily (with meals). , Normal, Disp-180 Tablet, R-3      insulin regular (HumuLIN R Regular U-100 Insuln) 100 unit/mL injection 30 units with breakfast,30 units with lunch and  30 units with dinner plus correction. 160 units per day max, Normal, Disp-50 mL, R-3      levothyroxine (SYNTHROID) 112 mcg tablet TAKE 1 TABLET BY MOUTH ONCE DAILY BEFORE BREAKFAST, Normal, Disp-90 Tablet, R-2      testosterone cypionate (DEPOTESTOTERONE CYPIONATE) 200 mg/mL injection INJECT 1/2 ML (100 MG) INTRAMUSCULARLY EVERY WEEK, Normal, Disp-2 mL, R-4      flash glucose sensor (FreeStyle Mikal 2 Sensor) kit Change sensor every 14 days, Normal, Disp-2 Kit, R-3      flash glucose scanning reader (FreeStyle Mikal 2 [place]) misc Change sensor every 14 days, Normal, Disp-1 Each, R-0      cholecalciferol (Vitamin D3) (5000 Units/125 mcg) tab tablet Take 1 Tab by mouth daily. , Normal, Disp-90 Tab, R-1      Insulin Syringe-Needle U-100 (BD Insulin Syringe) 1 mL 25 x 1\" syrg Use for drawing up/injecting testosterone once weekly. , Normal, Disp-100 Each,R-3      Syringe with Needle, Disp, 1 mL 25 gauge x 5/8\" syrg Use with testosterone once every week, Normal, Disp-50 Syringe,R-3      LORazepam (ATIVAN) 1 mg tablet Take 1 Tab by mouth every four (4) hours as needed for Anxiety.  Max Daily Amount: 6 mg., Normal, Disp-30 Tab, R-5 aluminum & magnesium hydroxide-simethicone (Maalox Maximum Strength) 400-400-40 mg/5 mL suspension Take 10 mL by mouth every six (6) hours as needed for Indigestion. , Historical Med      Omeprazole delayed release (PRILOSEC D/R) 20 mg tablet Take 1 Tab by mouth daily. , Normal, Disp-20 Tab, R-0      nitroglycerin (NITROSTAT) 0.4 mg SL tablet Take 1 Tab by mouth every five (5) minutes as needed for Chest Pain. Sit down then put one tab under the tongue every 5 minutes as needed, Normal, Disp-1 Bottle, R-0      metoprolol succinate (TOPROL-XL) 25 mg XL tablet Take 1 Tab by mouth daily. , Print, Disp-90 Tab, R-3      aspirin delayed-release 81 mg tablet Take 1 Tab by mouth daily. , Print, Disp-30 Tab, R-0         STOP taking these medications       rosuvastatin (CRESTOR) 40 mg tablet Comments:   Reason for Stoppin.   Follow-up Information     Follow up With Specialties Details Why Contact Info    [address]  Call  ORTHO: as needed Sánchez  [phone]        Return to ED if worse     Diagnosis     Clinical Impression:   1. Acute left-sided low back pain without sciatica    2.  Acute neck pain

## 2021-12-03 ENCOUNTER — OFFICE VISIT (OUTPATIENT)
Dept: NEUROLOGY | Age: 41
End: 2021-12-03
Payer: COMMERCIAL

## 2021-12-03 VITALS
BODY MASS INDEX: 39.1 KG/M2 | DIASTOLIC BLOOD PRESSURE: 55 MMHG | RESPIRATION RATE: 18 BRPM | TEMPERATURE: 97.8 F | SYSTOLIC BLOOD PRESSURE: 94 MMHG | WEIGHT: 264 LBS | HEART RATE: 74 BPM | OXYGEN SATURATION: 95 % | HEIGHT: 69 IN

## 2021-12-03 DIAGNOSIS — I63.81 MULTIPLE LACUNAR INFARCTS (HCC): ICD-10-CM

## 2021-12-03 DIAGNOSIS — M47.22 CERVICAL SPONDYLOSIS WITH RADICULOPATHY: ICD-10-CM

## 2021-12-03 DIAGNOSIS — R42 DIZZY SPELLS: ICD-10-CM

## 2021-12-03 DIAGNOSIS — G43.009 MIGRAINE WITHOUT AURA AND WITHOUT STATUS MIGRAINOSUS, NOT INTRACTABLE: Primary | ICD-10-CM

## 2021-12-03 PROCEDURE — 99215 OFFICE O/P EST HI 40 MIN: CPT | Performed by: PSYCHIATRY & NEUROLOGY

## 2021-12-03 NOTE — LETTER
12/3/2021 3:26 PM    Patient:  Bear Banks   YOB: 1980  Date of Visit: 12/3/2021      Dear Lev Thurman MD  [address]  [address]  [place] [facility] [place]  [address], [place]  [address] [facility] [address] [facility]  [address] [facility] [phone]  Via In Basket: Thank you for referring Mr. Naeem Becker to me for evaluation/treatment. Below are the relevant portions of my assessment and plan of care. If you have questions, please do not hesitate to call me. I look forward to following Mr. Cervantes along with you.         Sincerely,      Kelly Arriaga NP

## 2021-12-03 NOTE — ASSESSMENT & PLAN NOTE
MRI was unremarkable and duplex studies did not show any significant stenosis.     I believe this is coming from mostly metabolic issues with uncontrolled blood sugars [his most mid recent dizzy episode occurred with a blood sugar of 500] additionally he is also having some low blood pressure and this may be contributory especially with position changes    I have asked him to monitor his blood pressure as well as his blood sugar when he has his did dizzy episodes and he may need medications adjusted from cardiology related to blood pressure    It is also possible he is developing autonomic neuropathy secondary to diabetes at which point he may have to go on medications to increase his blood pressure with position changes but again given his complexed cardiac issues I would prefer to defer that into the range of cardiology if necessary

## 2021-12-03 NOTE — LETTER
12/3/2021    Patient: Jacquie Guajardo   YOB: 1980   Date of Visit: 12/3/2021     Cathy Turner MD  [address]  [address]  [address] [phone]  Via In Basket    Dear Cathy Turner MD,      Thank you for referring Mr. Paulie Garcia to [facility] for evaluation. My notes for this consultation are attached. If you have questions, please do not hesitate to call me. I look forward to following your patient along with you.       Sincerely,    Harsha Nelson NP

## 2021-12-03 NOTE — ASSESSMENT & PLAN NOTE
Has been not been particularly problematic however patient is beginning to have more problems from a cervical perspective and is afraid that this will aggravate his migraines.     He does have an appointment with Ortho which I think is appropriate

## 2021-12-03 NOTE — ASSESSMENT & PLAN NOTE
Patient has an appointment to see Ortho regarding symptoms of increasing neck pain and arm pain on the right side

## 2021-12-03 NOTE — PROGRESS NOTES
[facility] 82  In Office FOLLOW-UP VISIT         Girtha Siemens is a 42 y.o. male who presents today for the following:  Chief Complaint   Patient presents with    Follow-up     shooting pain from neck up, bulging disc C5 & C6    Migraine         ASSESSMENT AND PLAN      1. Migraine without aura and without status migrainosus, not intractable  Assessment & Plan:  Has been not been particularly problematic however patient is beginning to have more problems from a cervical perspective and is afraid that this will aggravate his migraines. He does have an appointment with Ortho which I think is appropriate  2. Multiple lacunar infarcts [facility])  Assessment & Plan:  Duplex studies did not show any hemodynamically significant stenosis  MRI scan was unremarkable    Patient remains on 81 mg aspirin a day as well as Plavix [dual purpose related to cardiac as well and managed by cardiology]    He has been encouraged to keep all of his comorbid conditions under good control and he is making a very concerted effort to do so  He is to follow-up with all of his specialists as appropriate as well as PCP for management of these issues    RECOMMENDATIONS:  - BP goal is less than 140/90  - Goal HbA1c is less than 7.   - LDL less than 70         3. Dizzy spells  Assessment & Plan:  MRI was unremarkable and duplex studies did not show any significant stenosis.     I believe this is coming from mostly metabolic issues with uncontrolled blood sugars [his most mid recent dizzy episode occurred with a blood sugar of 500] additionally he is also having some low blood pressure and this may be contributory especially with position changes    I have asked him to monitor his blood pressure as well as his blood sugar when he has his did dizzy episodes and he may need medications adjusted from cardiology related to blood pressure    It is also possible he is developing autonomic neuropathy secondary to diabetes at which point he may have to go on medications to increase his blood pressure with position changes but again given his complexed cardiac issues I would prefer to defer that into the range of cardiology if necessary  4. Cervical spondylosis with radiculopathy  Assessment & Plan:  Patient has an appointment to see Ortho regarding symptoms of increasing neck pain and arm pain on the right side          Patient and/or family was given time to ask questions and voice concerns. I believe all questions concerns were adequately addressed at this  office visit. Patient and/or family also verbalized agreement and understanding of the above-stated plan    Patient remains a complex patient secondary to polypharmacy, significant comorbid conditions, and use of high-risk medications which complicate the decision making process related to patient's neurologic diagnosis    Follow-up and Dispositions    · Return in about 6 months (around 6/3/2022) for In office appointment. ICD-10-CM ICD-9-CM    1. Migraine without aura and without status migrainosus, not intractable  G43.009 346.10    2. Multiple lacunar infarcts (HCC)  I63.81 434.91    3. Dizzy spells  R42 780.4    4. Cervical spondylosis with radiculopathy  M47.22 721.0          I attest that 40 minutes was spent on today's visit reviewing medical records and diagnostic testing deemed pertinent to this patient's care, along with direct time spent at patient's visit including the history, physical assessment and plan, discussing diagnosis and management along with documentation. HPI  Historical Data  Patient is known to the practice and was previously seen by Dr. Sharyn Mims    Neurologic diagnosis  Headaches and migraines  He has been having headaches for a long time but has been having worsening headaches since 2012. He does have close to 20 headache days in a month.   He has 2 kinds of headache, one but starts of the right occipital area which is a sharp headache that shoots to the right frontal.  He does also have headaches in the left side as well. His headaches can also be pounding and throbbing and the headache severity is between 3-10 out of 10. He does have associated photophobia, phonophobia and osmophobia. Each headache can last for 4-5 days. Risk Factors for headaches  Smoking: Denies denies  Coffee: Denies cups/day  Tea: Denies cups/day  Soda: 6 cans/day  Water: 1-2 glasses/day  Sleeps at 8-11 p.m. and wakes up at 5:30 aM. He does snore. He has been diagnosed with obstructive sleep apnea but did not get the CPAP machine because he lost his insurance.      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    Interim Data:   Since last office visit patient has been to the ED 3 times  9/4/2021 for rash: Penile swelling and redness   Patient had fever blisters and was started on Valtrex   He was treated for yeast infection the penile region with Monistat and Diflucan    To the ED on 10/27/2021 and he was admitted to the hospital and discharged on 10/31/2021   Presented to the ED chest discomfort    Discharge diagnosis was dizziness and abdominal pain    Uncontrolled type 1 diabetes    Cardiac cath completed had PCI of CX marginal for stent restenosis: was restented     Was to continue on aspirin and Plavix      ED on 12/1/2021 for back pain: Has old chronic upper back pain but was in because of low back pain radiating to left lower leg   Was discharged on Norco 5/325 15 tablets no refills    Starting cardiac rehab next week      Dizziness  With position changes: lightheaded and bending and also when he stands up  If gets up might get lightheaded  If puts head down can occur  Pre-syncope but no actual syncopal events  Denies any acute neurologic symptoms related to these events   MRI of the brain completed and was normal and his results were reviewed at office visit of 12/3/2021 along with sending a note MyChart to the patient previously    Having shooting pain from RT neck to Headaches and down Rt arm   Going to [facility]     Other  Diabetes: blood sugar normally \"ok\" runs between 150-200 since d/c from hospital end of Oct 2021   12/3/21:    LT eye: \"getting shots in eyes\"   Had surgery and now Left eye much improved   Due for Rt eye intervention in January 2021          Pertinent diagnostic data    Carotid duplex studies completed 9/1/2021  This study consisted of pulsed wave Doppler examination, color flow imaging, and duplex imaging of both right and left carotid systems in both vertebral arteries     Imaging of both right and left carotid systems showed mild mixed plaque in the bifurcations and proximal and distal internal carotid arteries bilaterally with stenosis in the range of 16-49% only and with no flow abnormalities identified. Both external carotid arteries and both common carotid arteries showed showed normal antegrade flow, and showed mild disease in the range of 0-15% only without associated flow abnormalities.     Left vertebral artery showed normal antegrade flow, but the right vertebral artery was not well visualized, most likely representing technical difficulty, but cannot completely rule out obstructive cerebrovascular disease or congenital variant, so if clinically indicated other imaging modalities like CTA or MRA may be of further diagnostic benefit, if clinically indicated.     Clinical correlation recommended. 11/28/19    ECHO ADULT COMPLETE 11/29/2019 11/29/2019    Interpretation Summary  · Left Ventricle: Normal cavity size, systolic function (ejection fraction normal) and diastolic function. Moderate concentric hypertrophy.  Estimated left ventricular ejection fraction is 61 - 65%.  · Mitral Valve: Trace mitral valve regurgitation is present. Signed by: Cecy Vincent MD on 11/29/2019 12:26 PM      Results from Hospital Encounter encounter on 09/03/21    MRI BRAIN WO CONT    Impression  No acute intracranial abnormality. Normal IACs. Results from [place] encounter on 08/03/19    MRI BRAIN W WO CONT    Impression  IMPRESSION: Small bilateral remote basal ganglia lacunes . No acute lesion no  masses. 12/01/21:  Lumbar spine x-rays    IMPRESSION  No acute bony abnormality of the lumbar spine is confirmed. See  discussion above. Allergies   Allergen Reactions    Morphine Nausea and Vomiting    Penicillin G Swelling    Percocet [Oxycodone-Acetaminophen] Hives and Nausea and Vomiting     Pt is allergic to Oxycodone, has previously tolerated Tylenol and Hydrocodone.  Sulfa (Sulfonamide Antibiotics) Swelling    Tramadol Nausea Only     Nausea and headache         Current Outpatient Medications   Medication Sig    HYDROcodone-acetaminophen (NORCO) 5-325 mg per tablet Take 1 Tablet by mouth every six (6) hours as needed for Pain for up to 5 days. Max Daily Amount: 4 Tablets.  insulin glargine (Lantus U-100 Insulin) 100 unit/mL injection INJECT 95 UNITS SUBCUTANEOUSLY IN THE MORNING AND 95 UNITS AT NIGHT    sertraline (ZOLOFT) 100 mg tablet Take 1 tablet by mouth once daily    clopidogreL (PLAVIX) 75 mg tab Take 1 Tablet by mouth daily.  lisinopriL (PRINIVIL, ZESTRIL) 20 mg tablet Take 1 Tablet by mouth daily.  busPIRone (BUSPAR) 15 mg tablet Take 1 tablet by mouth twice daily    metFORMIN (GLUCOPHAGE) 500 mg tablet Take 1 Tablet by mouth two (2) times daily (with meals).  insulin regular (HumuLIN R Regular U-100 Insuln) 100 unit/mL injection 30 units with breakfast,30 units with lunch and  30 units with dinner plus correction.  160 units per day max (Patient taking differently: 20 units with breakfast, 20 units with lunch and  20 units with dinner plus correction. 160 units per day max)    levothyroxine (SYNTHROID) 112 mcg tablet TAKE 1 TABLET BY MOUTH ONCE DAILY BEFORE BREAKFAST    flash glucose sensor (FreeStyle Mikal 2 Sensor) kit Change sensor every 14 days    flash glucose scanning reader (FreeStyle Mikal 2 [place]ett) misc Change sensor every 14 days    cholecalciferol (Vitamin D3) (5000 Units/125 mcg) tab tablet Take 1 Tab by mouth daily.  Insulin Syringe-Needle U-100 (BD Insulin Syringe) 1 mL 25 x 1\" syrg Use for drawing up/injecting testosterone once weekly.  Syringe with Needle, Disp, 1 mL 25 gauge x 5/8\" syrg Use with testosterone once every week    LORazepam (ATIVAN) 1 mg tablet Take 1 Tab by mouth every four (4) hours as needed for Anxiety. Max Daily Amount: 6 mg.  aluminum & magnesium hydroxide-simethicone (Maalox Maximum Strength) 400-400-40 mg/5 mL suspension Take 10 mL by mouth every six (6) hours as needed for Indigestion.  Omeprazole delayed release (PRILOSEC D/R) 20 mg tablet Take 1 Tab by mouth daily.  nitroglycerin (NITROSTAT) 0.4 mg SL tablet Take 1 Tab by mouth every five (5) minutes as needed for Chest Pain. Sit down then put one tab under the tongue every 5 minutes as needed    metoprolol succinate (TOPROL-XL) 25 mg XL tablet Take 1 Tab by mouth daily. (Patient taking differently: Take 25 mg by mouth two (2) times a day.)    aspirin delayed-release 81 mg tablet Take 1 Tab by mouth daily.  ofloxacin (FLOXIN) 0.3 % ophthalmic solution  (Patient not taking: Reported on 12/3/2021)     No current facility-administered medications for this visit.        Past medical history/surgical history, family history, and social history have been reviewed for today's visit      ROS    A ten system review of constitutional, cardiovascular, respiratory, musculoskeletal, endocrine, skin, SHEENT, genitourinary, psychiatric and neurologic systems was obtained and is unremarkable except as mentioned under HPI          EXAMINATION:     Visit Vitals  BP (!) 94/55   Pulse 74   Temp 97.8 °F (36.6 °C) (Temporal)   Resp 18   Ht 5' 9\" (1.753 m)   Wt 264 lb (119.7 kg)   SpO2 95%   BMI 38.99 kg/m²         General appearance: Patient is well-developed and well-nourished large girth and large boned gentleman in no apparent distress and well groomed. Psych/mental health:  Affect: Appropriate    PHQ  3 most recent PHQ Screens 3/24/2020   Little interest or pleasure in doing things Not at all   Feeling down, depressed, irritable, or hopeless Several days   Total Score PHQ 2 1   Trouble falling or staying asleep, or sleeping too much Several days   Feeling tired or having little energy Several days   Poor appetite, weight loss, or overeating Not at all   Feeling bad about yourself - or that you are a failure or have let yourself or your family down Not at all   Trouble concentrating on things such as school, work, reading, or watching TV Not at all   Moving or speaking so slowly that other people could have noticed; or the opposite being so fidgety that others notice Not at all   Thoughts of being better off dead, or hurting yourself in some way Not at all   PHQ 9 Score 3       HEENT:   Normocephalic  With evidence of trauma: No  Full range of motion head neck: Yes  Tenderness to palpation of the head neck region: No      Cardiovascular:     Extremities warm to touch: Yes  Extremity swelling: No  Discoloration: No  Evidence of PVD: No    Respiratory:   Dyspnea on exertion: No   Abnormal effort on casual observation: No   Use of portable oxygen: No   Evidence of cyanosis: No     Musculoskeletal:   Evidence of significant bone deformities: No   Spinal curvature: No     Integumentary:    Obvious bruising: No   Lacerations or discoloration on casual observation: No       Neurological Examination:   Mental Status:        MMSE  No flowsheet data found.      Formal testing was not completed    there was nothing concerning on general observation and discussion. Alert oriented and appropriate to general conversation  Normal processing on general observation  Followed conversation and responded seemingly appropriate throughout the office visit  No word finding difficulties noted on casual observation  Able to follow directions without difficulty     Cranial Nerves:    Pupils equal round reactive to light  EOMs intact gaze is conjugate  No nystagmus is appreciated  Facial motor intact bilaterally  Hearing intact to conversation  Voice with normal projection, no evidence of secretion pooling  Shoulder shrug intact bilaterally  No tongue deviation appreciated     Motor:   Normal bulk  No tremor appreciated on today's exam  No abnormal movements appreciated on today's exam  Moves extremities spontaneously and with purpose  5/5 x 4      Sensation: Intact to light touch    Coordination/Cerebellar:   Grossly intact    Gait: Ambulates independently    Reflexes: Diminished but symmetrical    Fall risk assessment  Fall Risk Assessment, last 12 mths 3/10/2021   Able to walk? Yes   Fall in past 12 months? 0   Do you feel unsteady?  0   Are you worried about falling 0                 Vivienne Soliz MS, ANP-BC, [facility]

## 2021-12-03 NOTE — ASSESSMENT & PLAN NOTE
Duplex studies did not show any hemodynamically significant stenosis  MRI scan was unremarkable    Patient remains on 81 mg aspirin a day as well as Plavix [dual purpose related to cardiac as well and managed by cardiology]    He has been encouraged to keep all of his comorbid conditions under good control and he is making a very concerted effort to do so  He is to follow-up with all of his specialists as appropriate as well as PCP for management of these issues    RECOMMENDATIONS:  - BP goal is less than 140/90  - Goal HbA1c is less than 7.   - LDL less than 70

## 2021-12-03 NOTE — PATIENT INSTRUCTIONS
As per discussion    You are doing a great job continue on managing your blood sugars I do want you to monitor your blood pressure as it starting to get borderline low and have that addressed through cardiology    I agree with you seeing orthopedics regarding your neck    And I do think the issues related to going to cardiac rehab will help you on a lot of other domains    I want you to focus on keeping your blood sugar under good control an inch is lost not so much the actual weight    I hope you have a great holiday season and happy new year    Office Policies    o Phone calls/patient messages:  Please allow up to 24 hours for someone in the office to contact you about your call or message. Be mindful your provider may be out of the office or your message may require further review. We encourage you to use [employer] for your messages as this is a faster, more efficient way to communicate with our office    o Medication Refills:  Prescription medications require up to 48 business hours to process. We encourage you to use [employer] for your refills. For controlled medications: Please allow up to 72 business hours to process. Certain medications may require you to  a written prescription at our office. NO narcotic/controlled medications will be prescribed after 4pm Monday through Friday or on weekends    o Form/Paperwork Completion:  We ask that you allow 7-14 business days. You may also download your forms to [employer] to have your doctor print off.

## 2021-12-08 ENCOUNTER — HOSPITAL ENCOUNTER (EMERGENCY)
Age: 41
Discharge: HOME | End: 2021-12-08
Attending: EMERGENCY MEDICINE
Payer: COMMERCIAL

## 2021-12-08 ENCOUNTER — APPOINTMENT (OUTPATIENT)
Dept: GENERAL RADIOLOGY | Age: 41
End: 2021-12-08
Attending: EMERGENCY MEDICINE
Payer: COMMERCIAL

## 2021-12-08 VITALS
BODY MASS INDEX: 39.97 KG/M2 | SYSTOLIC BLOOD PRESSURE: 127 MMHG | RESPIRATION RATE: 20 BRPM | HEART RATE: 75 BPM | HEIGHT: 69 IN | DIASTOLIC BLOOD PRESSURE: 62 MMHG | WEIGHT: 269.84 LBS | OXYGEN SATURATION: 95 % | TEMPERATURE: 98.4 F

## 2021-12-08 DIAGNOSIS — R07.9 CHEST PAIN WITH LOW RISK OF ACUTE CORONARY SYNDROME: Primary | ICD-10-CM

## 2021-12-08 LAB
ALBUMIN SERPL-MCNC: 3.6 G/DL (ref 3.5–5)
ALBUMIN/GLOB SERPL: 1 {RATIO} (ref 1.1–2.2)
ALP SERPL-CCNC: 81 U/L (ref 45–117)
ALT SERPL-CCNC: 49 U/L (ref 12–78)
ANION GAP SERPL CALC-SCNC: 6 MMOL/L (ref 5–15)
AST SERPL-CCNC: 28 U/L (ref 15–37)
ATRIAL RATE: 78 BPM
BASOPHILS # BLD: 0 K/UL (ref 0–0.1)
BASOPHILS NFR BLD: 0 % (ref 0–1)
BILIRUB SERPL-MCNC: 0.4 MG/DL (ref 0.2–1)
BNP SERPL-MCNC: 69 PG/ML
BUN SERPL-MCNC: 15 MG/DL (ref 6–20)
BUN/CREAT SERPL: 10 (ref 12–20)
CALCIUM SERPL-MCNC: 9.3 MG/DL (ref 8.5–10.1)
CALCULATED P AXIS, ECG09: 50 DEGREES
CALCULATED R AXIS, ECG10: 20 DEGREES
CALCULATED T AXIS, ECG11: 112 DEGREES
CHLORIDE SERPL-SCNC: 102 MMOL/L (ref 97–108)
CO2 SERPL-SCNC: 29 MMOL/L (ref 21–32)
CREAT SERPL-MCNC: 1.43 MG/DL (ref 0.7–1.3)
DIAGNOSIS, 93000: NORMAL
DIFFERENTIAL METHOD BLD: NORMAL
EOSINOPHIL # BLD: 0.2 K/UL (ref 0–0.4)
EOSINOPHIL NFR BLD: 2 % (ref 0–7)
ERYTHROCYTE [DISTWIDTH] IN BLOOD BY AUTOMATED COUNT: 13.8 % (ref 11.5–14.5)
GLOBULIN SER CALC-MCNC: 3.6 G/DL (ref 2–4)
GLUCOSE SERPL-MCNC: 309 MG/DL (ref 65–100)
HCT VFR BLD AUTO: 38.6 % (ref 36.6–50.3)
HGB BLD-MCNC: 12.8 G/DL (ref 12.1–17)
IMM GRANULOCYTES # BLD AUTO: 0 K/UL (ref 0–0.04)
IMM GRANULOCYTES NFR BLD AUTO: 0 % (ref 0–0.5)
LYMPHOCYTES # BLD: 2.4 K/UL (ref 0.8–3.5)
LYMPHOCYTES NFR BLD: 31 % (ref 12–49)
MCH RBC QN AUTO: 28.5 PG (ref 26–34)
MCHC RBC AUTO-ENTMCNC: 33.2 G/DL (ref 30–36.5)
MCV RBC AUTO: 86 FL (ref 80–99)
METAMYELOCYTES NFR BLD MANUAL: 1 %
MONOCYTES # BLD: 0.6 K/UL (ref 0–1)
MONOCYTES NFR BLD: 8 % (ref 5–13)
MYELOCYTES NFR BLD MANUAL: 1 %
NEUTS BAND NFR BLD MANUAL: 1 %
NEUTS SEG # BLD: 4.4 K/UL (ref 1.8–8)
NEUTS SEG NFR BLD: 56 % (ref 32–75)
NRBC # BLD: 0 K/UL (ref 0–0.01)
NRBC BLD-RTO: 0 PER 100 WBC
P-R INTERVAL, ECG05: 162 MS
PLATELET # BLD AUTO: 183 K/UL (ref 150–400)
PMV BLD AUTO: 10 FL (ref 8.9–12.9)
POTASSIUM SERPL-SCNC: 4.5 MMOL/L (ref 3.5–5.1)
PROT SERPL-MCNC: 7.2 G/DL (ref 6.4–8.2)
Q-T INTERVAL, ECG07: 358 MS
QRS DURATION, ECG06: 92 MS
QTC CALCULATION (BEZET), ECG08: 408 MS
RBC # BLD AUTO: 4.49 M/UL (ref 4.1–5.7)
RBC MORPH BLD: NORMAL
SODIUM SERPL-SCNC: 137 MMOL/L (ref 136–145)
TROPONIN-HIGH SENSITIVITY: 10 NG/L (ref 0–76)
TROPONIN-HIGH SENSITIVITY: 12 NG/L (ref 0–76)
VENTRICULAR RATE, ECG03: 78 BPM
WBC # BLD AUTO: 7.7 K/UL (ref 4.1–11.1)

## 2021-12-08 PROCEDURE — 71046 X-RAY EXAM CHEST 2 VIEWS: CPT

## 2021-12-08 PROCEDURE — 80053 COMPREHEN METABOLIC PANEL: CPT

## 2021-12-08 PROCEDURE — 36415 COLL VENOUS BLD VENIPUNCTURE: CPT

## 2021-12-08 PROCEDURE — 96374 THER/PROPH/DIAG INJ IV PUSH: CPT

## 2021-12-08 PROCEDURE — 84484 ASSAY OF TROPONIN QUANT: CPT

## 2021-12-08 PROCEDURE — 93005 ELECTROCARDIOGRAM TRACING: CPT

## 2021-12-08 PROCEDURE — 83880 ASSAY OF NATRIURETIC PEPTIDE: CPT

## 2021-12-08 PROCEDURE — 85025 COMPLETE CBC W/AUTO DIFF WBC: CPT

## 2021-12-08 PROCEDURE — 99285 EMERGENCY DEPT VISIT HI MDM: CPT

## 2021-12-08 PROCEDURE — 74011250636 HC RX REV CODE- 250/636: Performed by: EMERGENCY MEDICINE

## 2021-12-08 RX ORDER — KETOROLAC TROMETHAMINE 30 MG/ML
15 INJECTION, SOLUTION INTRAMUSCULAR; INTRAVENOUS ONCE
Status: COMPLETED | OUTPATIENT
Start: 2021-12-08 | End: 2021-12-08

## 2021-12-08 RX ADMIN — KETOROLAC TROMETHAMINE 15 MG: 30 INJECTION, SOLUTION INTRAMUSCULAR; INTRAVENOUS at 14:18

## 2021-12-08 NOTE — DISCHARGE INSTRUCTIONS
It was a pleasure taking care of you in our Emergency Department today. We know that when you come to [facility], you are entrusting us with your health, comfort, and safety. Our physicians and nurses honor that trust, and truly appreciate the opportunity to care for you and your loved ones. We also value your feedback. If you receive a survey about your Emergency Department experience today, please fill it out. We care about our patients' feedback, and we listen to what you have to say. Please read over your discharge instructions as these contain pertinent information to help you in the healing process. These instructions include a list of prescriptions you were given today. Follow-up information is also noted on your discharge papers. There are attached instructions and information pertaining to the reason why you were seen in the emergency department today. These discharge instructions may not be for exactly why you were here, but may be the closest available instructions that we have. These include important advice for things that you can do at home to feel better, and reasons to return to the emergency department. The evaluation and treatment you received in the emergency department is not always definitive care. If follow-up with your primary care doctor or specialist was recommended, it is important that you make these appointments for follow-up care. You may need further testing, procedures, and/or medications to help you feel better. Further tests may be required that are not available in the emergency department. Failure to make these follow-up appointments may jeopardize your health. The emergency department is here for emergent stabilization and evaluation of life and limb threatening illness and/or injuries.   Further care through a specialist or primary care doctor may be required to assist in your healing and complete your treatment and/or evaluation. We may not always be able to make a diagnosis in the emergency department, or things may change that will alter your diagnosis. Our primary goal is to ensure that nothing serious is occurring and that you are stable to continue your treatment and evaluation at home as an outpatient. Of course, if things change, and you feel worse, you are always encouraged to return to the emergency department for re-evaluation. Lab Results Today:  Recent Results (from the past 8 hour(s))   EKG, 12 LEAD, INITIAL    Collection Time: 12/08/21 11:57 AM   Result Value Ref Range    Ventricular Rate 78 BPM    Atrial Rate 78 BPM    P-R Interval 162 ms    QRS Duration 92 ms    Q-T Interval 358 ms    QTC Calculation (Bezet) 408 ms    Calculated P Axis 50 degrees    Calculated R Axis 20 degrees    Calculated T Axis 112 degrees    Diagnosis       Normal sinus rhythm  ST & T wave abnormality, consider lateral ischemia  When compared with ECG of 29-OCT-2021 09:03,  T wave inversion more evident in Lateral leads  Confirmed by Seth Polo ([id number]) on 12/8/2021 1:59:27 PM     CBC WITH AUTOMATED DIFF    Collection Time: 12/08/21 11:59 AM   Result Value Ref Range    WBC 7.7 4.1 - 11.1 K/uL    RBC 4.49 4. 10 - 5.70 M/uL    HGB 12.8 12.1 - 17.0 g/dL    HCT 38.6 36.6 - 50.3 %    MCV 86.0 80.0 - 99.0 FL    MCH 28.5 26.0 - 34.0 PG    MCHC 33.2 30.0 - 36.5 g/dL    RDW 13.8 11.5 - 14.5 %    PLATELET 934 599 - 785 K/uL    MPV 10.0 8.9 - 12.9 FL    NRBC 0.0 0  WBC    ABSOLUTE NRBC 0.00 0.00 - 0.01 K/uL    NEUTROPHILS 56 32 - 75 %    BAND NEUTROPHILS 1 %    LYMPHOCYTES 31 12 - 49 %    MONOCYTES 8 5 - 13 %    EOSINOPHILS 2 0 - 7 %    BASOPHILS 0 0 - 1 %    METAMYELOCYTES 1 %    MYELOCYTES 1 %    IMMATURE GRANULOCYTES 0 0.0 - 0.5 %    ABS. NEUTROPHILS 4.4 1.8 - 8.0 K/UL    ABS. LYMPHOCYTES 2.4 0.8 - 3.5 K/UL    ABS. MONOCYTES 0.6 0.0 - 1.0 K/UL    ABS. EOSINOPHILS 0.2 0.0 - 0.4 K/UL    ABS. BASOPHILS 0.0 0.0 - 0.1 K/UL    ABS. IMM. GRANS. 0.0 0.00 - 0.04 K/UL    DF MANUAL      RBC COMMENTS NORMOCYTIC, NORMOCHROMIC     METABOLIC PANEL, COMPREHENSIVE    Collection Time: 12/08/21 11:59 AM   Result Value Ref Range    Sodium 137 136 - 145 mmol/L    Potassium 4.5 3.5 - 5.1 mmol/L    Chloride 102 97 - 108 mmol/L    CO2 29 21 - 32 mmol/L    Anion gap 6 5 - 15 mmol/L    Glucose 309 (H) 65 - 100 mg/dL    BUN 15 6 - 20 MG/DL    Creatinine 1.43 (H) 0.70 - 1.30 MG/DL    BUN/Creatinine ratio 10 (L) 12 - 20      GFR est AA >60 >60 ml/min/1.73m2    GFR est non-AA 54 (L) >60 ml/min/1.73m2    Calcium 9.3 8.5 - 10.1 MG/DL    Bilirubin, total 0.4 0.2 - 1.0 MG/DL    ALT (SGPT) 49 12 - 78 U/L    AST (SGOT) 28 15 - 37 U/L    Alk. phosphatase 81 45 - 117 U/L    Protein, total 7.2 6.4 - 8.2 g/dL    Albumin 3.6 3.5 - 5.0 g/dL    Globulin 3.6 2.0 - 4.0 g/dL    A-G Ratio 1.0 (L) 1.1 - 2.2     NT-PRO BNP    Collection Time: 12/08/21 11:59 AM   Result Value Ref Range    NT pro-BNP 69 <125 PG/ML   TROPONIN-HIGH SENSITIVITY    Collection Time: 12/08/21 11:59 AM   Result Value Ref Range    Troponin-High Sensitivity 12 0 - 76 ng/L   TROPONIN-HIGH SENSITIVITY    Collection Time: 12/08/21  1:54 PM   Result Value Ref Range    Troponin-High Sensitivity 10 0 - 76 ng/L        Radiology Results Today:  XR CHEST PA LAT    Result Date: 12/8/2021  1.  No acute cardiopulmonary disease

## 2021-12-08 NOTE — ED PROVIDER NOTES
EMERGENCY DEPARTMENT HISTORY AND PHYSICAL EXAM      Date: 12/8/2021  Patient Name: Love Oreilly    History of Presenting Illness     Chief Complaint   Patient presents with    Chest Pain     c/o chest pain that radiates under left arm; PT stated he took 1 NTG with some relief; +SOB, near syncope       History Provided By: Patient    HPI: Love Oreilly, 42 y.o. male  presents to the ED with cc of chest pain. Patient states he has pain in the left side of his chest under his axilla radiating to the left upper arm. It occurred while he was at the [facility] with a friend. After his friend was seen at the [facility] patient came to the emergency department. In the meantime he states he took 1 nitroglycerin which did help with the pain. He has a significant coronary artery disease history. 1 month ago had a stent placed by Dr. Mge Dubois. Dr. Lex Martinez is his primary cardiologist.  He is currently on aspirin and Plavix. He states he did feel nauseous and had some lightheadedness. Pain is only very minor at this time and mostly resolved. No leg swelling.     Past History     Past Medical History:  Past Medical History:   Diagnosis Date    Anxiety and depression     Bleeding of eye, left     8/17/21 pt reports receiving injections in right eye for beeding    Chronic headaches 2007    COVID-19 12/20/2020    Diabetes type 1, uncontrolled ([address]     since 8years old    GERD (gastroesophageal reflux disease)     Hypercholesterolemia     Hypertension     Hypothyroidism     MI (myocardial infarction) ([address]     as of 8/17/21 pt reports 8 stents total    WALLY on CPAP        Past Surgical History:  Past Surgical History:   Procedure Laterality Date    HX CARPAL TUNNEL RELEASE Left 2012    HX CARPAL TUNNEL RELEASE Right 2018    CTE trigger thumb and index finger    HX HEART CATHETERIZATION      HX LAP CHOLECYSTECTOMY  8/26/14    Dr Annika Mas    HX WISDOM TEETH EXTRACTION         Medications:  No current facility-administered medications on file prior to encounter. Current Outpatient Medications on File Prior to Encounter   Medication Sig Dispense Refill    insulin glargine (Lantus U-100 Insulin) 100 unit/mL injection INJECT 95 UNITS SUBCUTANEOUSLY IN THE MORNING AND 95 UNITS AT NIGHT 60 mL 0    sertraline (ZOLOFT) 100 mg tablet Take 1 tablet by mouth once daily 30 Tablet 4    clopidogreL (PLAVIX) 75 mg tab Take 1 Tablet by mouth daily. 30 Tablet 12    lisinopriL (PRINIVIL, ZESTRIL) 20 mg tablet Take 1 Tablet by mouth daily. 30 Tablet 12    ofloxacin (FLOXIN) 0.3 % ophthalmic solution  (Patient not taking: Reported on 12/3/2021)      busPIRone (BUSPAR) 15 mg tablet Take 1 tablet by mouth twice daily 60 Tablet 5    metFORMIN (GLUCOPHAGE) 500 mg tablet Take 1 Tablet by mouth two (2) times daily (with meals). 180 Tablet 3    insulin regular (HumuLIN R Regular U-100 Insuln) 100 unit/mL injection 30 units with breakfast,30 units with lunch and  30 units with dinner plus correction. 160 units per day max (Patient taking differently: 20 units with breakfast, 20 units with lunch and  20 units with dinner plus correction. 160 units per day max) 50 mL 3    levothyroxine (SYNTHROID) 112 mcg tablet TAKE 1 TABLET BY MOUTH ONCE DAILY BEFORE BREAKFAST 90 Tablet 2    flash glucose sensor (FreeStyle Mikal 2 Sensor) kit Change sensor every 14 days 2 Kit 3    flash glucose scanning reader (FreeStyle Mikal 2 [place]) misc Change sensor every 14 days 1 Each 0    cholecalciferol (Vitamin D3) (5000 Units/125 mcg) tab tablet Take 1 Tab by mouth daily. 90 Tab 1    Insulin Syringe-Needle U-100 (BD Insulin Syringe) 1 mL 25 x 1\" syrg Use for drawing up/injecting testosterone once weekly. 100 Each 3    Syringe with Needle, Disp, 1 mL 25 gauge x 5/8\" syrg Use with testosterone once every week 50 Syringe 3    LORazepam (ATIVAN) 1 mg tablet Take 1 Tab by mouth every four (4) hours as needed for Anxiety.  Max Daily Amount: 6 mg. 30 Tab 5    aluminum & magnesium hydroxide-simethicone (Maalox Maximum Strength) 400-400-40 mg/5 mL suspension Take 10 mL by mouth every six (6) hours as needed for Indigestion.  Omeprazole delayed release (PRILOSEC D/R) 20 mg tablet Take 1 Tab by mouth daily. 20 Tab 0    nitroglycerin (NITROSTAT) 0.4 mg SL tablet Take 1 Tab by mouth every five (5) minutes as needed for Chest Pain. Sit down then put one tab under the tongue every 5 minutes as needed 1 Bottle 0    metoprolol succinate (TOPROL-XL) 25 mg XL tablet Take 1 Tab by mouth daily. (Patient taking differently: Take 25 mg by mouth two (2) times a day.) 90 Tab 3    aspirin delayed-release 81 mg tablet Take 1 Tab by mouth daily. 27 Tab 0       Family History:  Family History   Problem Relation Age of Onset    Diabetes Mother     Heart Disease Father     Cancer Father     Diabetes Father     Diabetes Paternal Aunt     Diabetes Maternal Grandmother     Thyroid Cancer Maternal Grandmother     Kidney Disease Maternal Grandmother     Arthritis Maternal Grandmother     Heart Disease Maternal Grandfather     High Cholesterol Maternal Grandfather     Hypertension Maternal Grandfather     Diabetes Paternal Grandmother     Hemophilia Paternal Grandfather        Social History:  Social History     Tobacco Use    Smoking status: Former Smoker     Packs/day: 0.00     Years: 14.00     Pack years: 0.00     Quit date: 2014     Years since quittin.9    Smokeless tobacco: Never Used   Substance Use Topics    Alcohol use: No    Drug use: No       Allergies: Allergies   Allergen Reactions    Morphine Nausea and Vomiting    Penicillin G Swelling    Percocet [Oxycodone-Acetaminophen] Hives and Nausea and Vomiting     Pt is allergic to Oxycodone, has previously tolerated Tylenol and Hydrocodone.     Sulfa (Sulfonamide Antibiotics) Swelling    Tramadol Nausea Only     Nausea and headache         All the above components of the past  history are auto-populated from the electronic record. They have been reviewed and the patient has been interviewed for any pertinent past history that pertains to the patient's chief complaint and reason for visit. Not all pre-populated components may be accurate at the time this note was generated. Review of Systems   Review of Systems   Constitutional: Negative for chills and fever. HENT: Negative for congestion, ear pain, rhinorrhea, sore throat and trouble swallowing. Eyes: Negative for visual disturbance. Respiratory: Negative for cough, chest tightness and shortness of breath. Cardiovascular: Positive for chest pain. Negative for palpitations. Gastrointestinal: Positive for nausea. Negative for abdominal pain, blood in stool, constipation, diarrhea and vomiting. Genitourinary: Negative for decreased urine volume, difficulty urinating, dysuria and frequency. Musculoskeletal: Negative for back pain and neck pain. Skin: Negative for color change and rash. Neurological: Positive for light-headedness. Negative for dizziness, weakness and headaches. Physical Exam   Physical Exam  Vitals and nursing note reviewed. Constitutional:       General: He is not in acute distress. Appearance: He is well-developed. He is not ill-appearing. HENT:      Head: Normocephalic. Eyes:      Conjunctiva/sclera: Conjunctivae normal.   Cardiovascular:      Rate and Rhythm: Normal rate and regular rhythm. Pulmonary:      Effort: Pulmonary effort is normal. No accessory muscle usage or respiratory distress. Abdominal:      General: There is no distension. Musculoskeletal:      Cervical back: Normal range of motion. Skin:     General: Skin is warm and dry. Neurological:      Mental Status: He is alert and oriented to person, place, and time.          Due to the COVID-19 pandemic, in order to reduce the spread and transmission of the virus, some basic elements of the physical exam have been deferred to reduce direct or close contact with the patient unless they are deemed to be absolutely necessary, regardless of whether the virus is highly suspected or not. Diagnostic Study Results     Labs -     Recent Results (from the past 24 hour(s))   EKG, 12 LEAD, INITIAL    Collection Time: 12/08/21 11:57 AM   Result Value Ref Range    Ventricular Rate 78 BPM    Atrial Rate 78 BPM    P-R Interval 162 ms    QRS Duration 92 ms    Q-T Interval 358 ms    QTC Calculation (Bezet) 408 ms    Calculated P Axis 50 degrees    Calculated R Axis 20 degrees    Calculated T Axis 112 degrees    Diagnosis       Normal sinus rhythm  ST & T wave abnormality, consider lateral ischemia  When compared with ECG of 29-OCT-2021 09:03,  T wave inversion more evident in Lateral leads  Confirmed by Cande Mckee ([id number]) on 12/8/2021 1:59:27 PM     CBC WITH AUTOMATED DIFF    Collection Time: 12/08/21 11:59 AM   Result Value Ref Range    WBC 7.7 4.1 - 11.1 K/uL    RBC 4.49 4. 10 - 5.70 M/uL    HGB 12.8 12.1 - 17.0 g/dL    HCT 38.6 36.6 - 50.3 %    MCV 86.0 80.0 - 99.0 FL    MCH 28.5 26.0 - 34.0 PG    MCHC 33.2 30.0 - 36.5 g/dL    RDW 13.8 11.5 - 14.5 %    PLATELET 631 942 - 088 K/uL    MPV 10.0 8.9 - 12.9 FL    NRBC 0.0 0  WBC    ABSOLUTE NRBC 0.00 0.00 - 0.01 K/uL    NEUTROPHILS 56 32 - 75 %    BAND NEUTROPHILS 1 %    LYMPHOCYTES 31 12 - 49 %    MONOCYTES 8 5 - 13 %    EOSINOPHILS 2 0 - 7 %    BASOPHILS 0 0 - 1 %    METAMYELOCYTES 1 %    MYELOCYTES 1 %    IMMATURE GRANULOCYTES 0 0.0 - 0.5 %    ABS. NEUTROPHILS 4.4 1.8 - 8.0 K/UL    ABS. LYMPHOCYTES 2.4 0.8 - 3.5 K/UL    ABS. MONOCYTES 0.6 0.0 - 1.0 K/UL    ABS. EOSINOPHILS 0.2 0.0 - 0.4 K/UL    ABS. BASOPHILS 0.0 0.0 - 0.1 K/UL    ABS. IMM.  GRANS. 0.0 0.00 - 0.04 K/UL    DF MANUAL      RBC COMMENTS NORMOCYTIC, NORMOCHROMIC     METABOLIC PANEL, COMPREHENSIVE    Collection Time: 12/08/21 11:59 AM   Result Value Ref Range    Sodium 137 136 - 145 mmol/L    Potassium 4.5 3.5 - 5.1 mmol/L Chloride 102 97 - 108 mmol/L    CO2 29 21 - 32 mmol/L    Anion gap 6 5 - 15 mmol/L    Glucose 309 (H) 65 - 100 mg/dL    BUN 15 6 - 20 MG/DL    Creatinine 1.43 (H) 0.70 - 1.30 MG/DL    BUN/Creatinine ratio 10 (L) 12 - 20      GFR est AA >60 >60 ml/min/1.73m2    GFR est non-AA 54 (L) >60 ml/min/1.73m2    Calcium 9.3 8.5 - 10.1 MG/DL    Bilirubin, total 0.4 0.2 - 1.0 MG/DL    ALT (SGPT) 49 12 - 78 U/L    AST (SGOT) 28 15 - 37 U/L    Alk. phosphatase 81 45 - 117 U/L    Protein, total 7.2 6.4 - 8.2 g/dL    Albumin 3.6 3.5 - 5.0 g/dL    Globulin 3.6 2.0 - 4.0 g/dL    A-G Ratio 1.0 (L) 1.1 - 2.2     NT-PRO BNP    Collection Time: 12/08/21 11:59 AM   Result Value Ref Range    NT pro-BNP 69 <125 PG/ML   TROPONIN-HIGH SENSITIVITY    Collection Time: 12/08/21 11:59 AM   Result Value Ref Range    Troponin-High Sensitivity 12 0 - 76 ng/L   TROPONIN-HIGH SENSITIVITY    Collection Time: 12/08/21  1:54 PM   Result Value Ref Range    Troponin-High Sensitivity 10 0 - 76 ng/L       Radiologic Studies -   XR CHEST PA LAT   Final Result   1. No acute cardiopulmonary disease           CT Results  (Last 48 hours)    None        CXR Results  (Last 48 hours)               12/08/21 1225  XR CHEST PA LAT Final result    Impression:  1. No acute cardiopulmonary disease           Narrative:  INDICATION:  Chest Pain        Exam: Chest 2 views. Comparison: 10/27/2021. Findings: Cardiomediastinal silhouette is normal. Pulmonary vasculature is not   engorged. No focal parenchymal opacities, effusions, or pneumothorax. Bony   thorax is intact. Medical Decision Making     I reviewed the vital signs, available nursing notes, past medical history, past surgical history, family history and social history. Vital Signs-I have reviewed the vital signs that have been made available during the patient's emergency department visit.   The vital signs auto-populated below are obtained mostly by electronic means through monitoring devices that have been downloaded into the patient's chart by the nursing staff. Some vital signs are not downloaded into the chart until after the patient has been discharged and this note has been completed, therefore some vital signs may not be available to the physician for review prior to patient's discharge or admission. The physician has reviewed the patient's triage vital signs, monitored the electronic monitoring devices remotely for any significant vital sign abnormalities, and have reviewed vital signs prior to discharge. Some vital signs reviewed at bedside or remotely utilizing electronic monitoring devices may be different than the vital signs downloaded into the electronic medical record. Some vital signs may be erroneous and inaccurate since they are obtained by electronic monitoring devices, and not all vital signs are verified for accuracy by nursing staff prior to downloading into the patient's chart. Patient Vitals for the past 24 hrs:   Temp Pulse Resp BP SpO2   12/08/21 1155 98.4 °F (36.9 °C) 80 20 (!) 153/66 99 %         Records Reviewed: Nursing notes for today's visit have been reviewed. I have also reviewed most recent medical records pertinent to today's complaints, if available in our medical record system. I have also reviewed all labs and imaging results from previous results in comparison to results obtained today. If an EKG was obtained today, it has been compared to previous EKGs, if available. If arriving via EMS, the EMS report has been reviewed if made available to us within the patient's time in the emergency department. Provider Notes (Medical Decision Making):   Patient with a history of coronary artery disease presents with pain in the left shoulder and left arm. Patient just recently had stent placement a month ago. EKG shows no changes. He has 2 sets a high sensitivity troponins that are normal.  Atypical pain presentation.   Spoke with cardiologist who did his last stent and they feel he is safe to be discharged home. Likely musculoskeletal type pain. We will treat with low-dose of NSAIDs here in the ER. Recommend warm compress and heating pad. ED Course:   Initial assessment performed. The patients presenting problems have been discussed, and they are in agreement with the care plan formulated and outlined with them. I have encouraged them to ask questions as they arise throughout their visit. Orders Placed This Encounter    XR CHEST PA LAT    CBC WITH AUTOMATED DIFF    METABOLIC PANEL, COMPREHENSIVE    NT-PRO BNP    TROPONIN-HIGH SENSITIVITY    TROPONIN-HIGH SENSITIVITY    CHEST PAIN PANEL TRACKING (DO NOT DESELECT)    CARDIAC MONITOR - ED ONLY    OXYGEN CANNULA    VITAL SIGNS Per Protocol    MEASURE HEIGHT    WEIGH PATIENT    EKG NOTEWRITER(ASAP ONLY)    OXYGEN CANNULA Liters per minute: 2; Indications for O2 therapy: CHEST PAIN CONTINUOUS STAT    EKG, 12 LEAD, INITIAL    SALINE LOCK IV ONE TIME STAT    ketorolac (TORADOL) injection 15 mg    IP CONSULT TO CARDIOLOGY       EKG    Date/Time: 12/8/2021 11:57 AM  Performed by: Mable Mcgill MD  Authorized by: Mable Mcgill MD     ECG reviewed by ED Physician in the absence of a cardiologist: yes    Previous ECG:     Previous ECG:  Compared to current    Comparison ECG info:  10-    Similarity:  No change  Rate:     ECG rate:  78    ECG rate assessment: normal    Rhythm:     Rhythm: sinus rhythm    Ectopy:     Ectopy: none    QRS:     QRS axis:  Normal  Conduction:     Conduction: normal    ST segments:     ST segments:  Non-specific  T waves:     T waves: non-specific            Critical Care Time:   0    Disposition:  Discharge    The patient's emergency department evaluation is now complete. I have reviewed all labs, imaging, and pertinent information. I have discussed all results with the patient and/or family.   Based on our evaluation today I do believe that the patient is safe to be discharged home. The patient has been provided with at home instructions that are pertinent to their complaint today, although these may not be specific to the exact diagnosis. I have reviewed the patient's home medications and attempted to reconcile if not already done so by pharmacy or nursing staff. I have discussed all new prescriptions with the patient. The patient has been encouraged to follow-up with primary care doctor and/or specialist, and these have been discussed with the patient. The patient has been advised that they may return to the emergency department if they have any worsening symptoms and or new symptoms that are of concern to them. Verbal discharge instructions may have also been provided to the patient that may not be specifically contained in the written discharge instructions. The patient has been given opportunity to ask questions prior to discharge. PLAN:  1. Current Discharge Medication List        2. Follow-up Information     Follow up With Specialties Details Why Contact Info    Milford Kawasaki, MD Cardio Vascular Surgery, Interventional Cardiology, Cardiology Schedule an appointment as soon as possible for a visit  As needed [address]  [address]  [address] [phone]          Return to ED if worse     Diagnosis     Clinical Impression:   1.  Chest pain with low risk of acute coronary syndrome

## 2021-12-09 ENCOUNTER — HOSPITAL ENCOUNTER (OUTPATIENT)
Dept: CARDIAC REHAB | Age: 41
Discharge: HOME | End: 2021-12-09
Payer: COMMERCIAL

## 2021-12-09 VITALS — HEIGHT: 69 IN | WEIGHT: 267 LBS | BODY MASS INDEX: 39.55 KG/M2

## 2021-12-09 LAB
GLUCOSE BLD STRIP.AUTO-MCNC: 280 MG/DL (ref 65–117)
SERVICE CMNT-IMP: ABNORMAL

## 2021-12-09 PROCEDURE — 93798 PHYS/QHP OP CAR RHAB W/ECG: CPT

## 2021-12-09 RX ORDER — ROSUVASTATIN CALCIUM 20 MG/1
20 TABLET, COATED ORAL
COMMUNITY
End: 2022-04-14

## 2021-12-09 NOTE — CARDIO/PULMONARY
[facility]    Cardiac Rehabilitation Program    Intake Appointment  2021           NAME: Melissa Rodriguez : 1980 AGE: 42 y.o. GENDER: male    CARDIAC REHAB ADMITTING DIAGNOSIS: Presence of coronary angioplasty implant and graft [Z95.5]    REFERRING PHYSICIAN: Lisa Grigsby MD    MEDICAL HX:  Past Medical History:   Diagnosis Date    Anxiety and depression     Bleeding of eye, left     21 pt reports receiving injections in right eye for beeding    Chronic headaches 2007    COVID-19 2020    Diabetes type 1, uncontrolled ([facility] [address]     since 8years old    GERD (gastroesophageal reflux disease)     Hypercholesterolemia     Hypertension     Hypothyroidism     MI (myocardial infarction) ([facility] [address]     as of 21 pt reports 8 stents total    WALLY on CPAP        LABS:     Lab Results   Component Value Date/Time    Hemoglobin A1c 9.9 (H) 10/07/2021 01:50 PM    Hemoglobin A1c (POC) 9.6 2020 11:55 AM     Lab Results   Component Value Date/Time    Cholesterol, total 142 10/07/2021 01:50 PM    HDL Cholesterol 35 10/07/2021 01:50 PM    LDL,Direct 151 (H) 2014 02:16 AM    LDL, calculated 62.4 10/07/2021 01:50 PM    VLDL, calculated 44.6 10/07/2021 01:50 PM    Triglyceride 223 (H) 10/07/2021 01:50 PM    CHOL/HDL Ratio 4.1 10/07/2021 01:50 PM         MEDICATIONS/SUPPLEMENTS:     Prior to Admission medications    Medication Sig Start Date End Date Taking? Authorizing Provider   rosuvastatin (CRESTOR) 20 mg tablet Take 20 mg by mouth nightly.    Yes Provider, Historical   insulin glargine (Lantus U-100 Insulin) 100 unit/mL injection INJECT 95 UNITS SUBCUTANEOUSLY IN THE MORNING AND 95 UNITS AT NIGHT  Patient taking differently: INJECT 90 UNITS SUBCUTANEOUSLY IN THE MORNING AND 90 UNITS AT NIGHT 21  Yes Juancarlos Silva MD   sertraline (ZOLOFT) 100 mg tablet Take 1 tablet by mouth once daily 21  Yes Allan Molina MD   clopidogreL (PLAVIX) 75 mg tab Take 1 Tablet by mouth daily. 10/29/21  Yes Jake Hernandez MD   lisinopriL (PRINIVIL, ZESTRIL) 20 mg tablet Take 1 Tablet by mouth daily. 10/29/21  Yes Jake Hernandez MD   busPIRone (BUSPAR) 15 mg tablet Take 1 tablet by mouth twice daily 10/14/21  Yes Pelon Randle MD   metFORMIN (GLUCOPHAGE) 500 mg tablet Take 1 Tablet by mouth two (2) times daily (with meals). 10/1/21  Yes Kiran Spence MD   insulin regular (HumuLIN R Regular U-100 Insuln) 100 unit/mL injection 30 units with breakfast,30 units with lunch and  30 units with dinner plus correction. 160 units per day max  Patient taking differently: 20 units with breakfast, 20 units with lunch and  20 units with dinner plus correction. 160 units per day max 9/29/21  Yes Kiran Spence MD   levothyroxine (SYNTHROID) 112 mcg tablet TAKE 1 TABLET BY MOUTH ONCE DAILY BEFORE BREAKFAST 7/27/21  Yes Kiran Spence MD   flash glucose sensor (FreeStyle Hernandez 2 Sensor) kit Change sensor every 14 days 3/26/21  Yes Kiran Spence MD   flash glucose scanning reader [facility] Mikal 2 [place]) misc Change sensor every 14 days 3/26/21  Yes Kiran Spence MD   cholecalciferol (Vitamin D3) (5000 Units/125 mcg) tab tablet Take 1 Tab by mouth daily. 3/10/21  Yes Pelon Randle MD   Insulin Syringe-Needle U-100 (BD Insulin Syringe) 1 mL 25 x 1\" syrg Use for drawing up/injecting testosterone once weekly. 12/7/20  Yes Kiran Spence MD   Syringe with Needle, Disp, 1 mL 25 gauge x 5/8\" syrg Use with testosterone once every week 12/4/20  Yes Kiran Spence MD   LORazepam (ATIVAN) 1 mg tablet Take 1 Tab by mouth every four (4) hours as needed for Anxiety. Max Daily Amount: 6 mg. 5/8/20  Yes Lincoln Mathew MD   aluminum & magnesium hydroxide-simethicone (Maalox Maximum Strength) 400-400-40 mg/5 mL suspension Take 10 mL by mouth every six (6) hours as needed for Indigestion.    Yes Provider, Historical   Omeprazole delayed release (PRILOSEC D/R) 20 mg tablet Take 1 Tab by mouth daily. 2/14/20  Yes Stephan Baum,    nitroglycerin (NITROSTAT) 0.4 mg SL tablet Take 1 Tab by mouth every five (5) minutes as needed for Chest Pain. Sit down then put one tab under the tongue every 5 minutes as needed 12/4/19  Yes Josie Dallas MD   metoprolol succinate (TOPROL-XL) 25 mg XL tablet Take 1 Tab by mouth daily. Patient taking differently: Take 25 mg by mouth two (2) times a day. 12/3/19  Yes Sun Jorge MD   aspirin delayed-release 81 mg tablet Take 1 Tab by mouth daily. 9/24/19  Yes Regine Verduzco MD   ofloxacin (FLOXIN) 0.3 % ophthalmic solution  10/12/21   Provider, Historical       ANTHROPOMETRICS:      Ht Readings from Last 1 Encounters:   12/09/21 5' 9\" (1.753 m)      Wt Readings from Last 1 Encounters:   12/09/21 121.1 kg (267 lb)        WAIST: 48    SCREENING SCORES:                       [facility]: 30.2  [facility]: 28  Rate Your Plate: 41  Cardiac Knowledge: 8  PHQ-9: 7       VISIT SUMMARY:    Dre Guerrero 42 y.o. presented to [facility] for program orientation and 6 minute walk test today with a primary diagnosis of Presence of coronary angioplasty implant and graft [Z95.5]. Dre Guerrero has a history that includes: T1 Diabetes, HTN, CAD, GERD, Chronic Pain, hypothyroid. Cardiac risk factors include smoking/ tobacco exposure, family history, diabetes mellitus, obesity, hypertension, thyroid dysfunction. EF is  . Dre Guerrero is (not)  and lives alone. social hx. PHQ9, depression score, is  7 and this is considered moderate. The result was discussed with patient who affirms score to be accurate and score was faxed to PCP. Patient denied chest pain or SOB during 6 minute walk test and was in SR/ST/BBB. Patient walked 240 meters meters at a speed of 1.8 mph and grade of 0 % for a final MET level of 2.3. Exercise prescription developed around patient stated goals, to be supplemented with home exercise recommendations. Education manual given to patient and reviewed. Patient will attend cardiac rehab 2 times/week and denied the classes. Fasting Blood glucose was 380 today. Pt stated he ate lunch and took his insulin. RN checked BG and resulted at 280.        Linda Lam RN

## 2021-12-15 ENCOUNTER — APPOINTMENT (OUTPATIENT)
Dept: GENERAL RADIOLOGY | Age: 41
End: 2021-12-15
Attending: EMERGENCY MEDICINE
Payer: COMMERCIAL

## 2021-12-15 ENCOUNTER — HOSPITAL ENCOUNTER (EMERGENCY)
Age: 41
Discharge: HOME | End: 2021-12-15
Attending: EMERGENCY MEDICINE
Payer: COMMERCIAL

## 2021-12-15 ENCOUNTER — APPOINTMENT (OUTPATIENT)
Dept: CT IMAGING | Age: 41
End: 2021-12-15
Attending: EMERGENCY MEDICINE
Payer: COMMERCIAL

## 2021-12-15 VITALS
OXYGEN SATURATION: 92 % | TEMPERATURE: 97.7 F | BODY MASS INDEX: 38.51 KG/M2 | HEART RATE: 75 BPM | HEIGHT: 69 IN | RESPIRATION RATE: 18 BRPM | DIASTOLIC BLOOD PRESSURE: 76 MMHG | WEIGHT: 260 LBS | SYSTOLIC BLOOD PRESSURE: 113 MMHG

## 2021-12-15 DIAGNOSIS — R07.1 CHEST PAIN ON BREATHING: ICD-10-CM

## 2021-12-15 DIAGNOSIS — R91.8 LUNG INFILTRATE ON CT: Primary | ICD-10-CM

## 2021-12-15 DIAGNOSIS — F41.0 PANIC ATTACK: ICD-10-CM

## 2021-12-15 LAB
ALBUMIN SERPL-MCNC: 3.4 G/DL (ref 3.5–5)
ALBUMIN/GLOB SERPL: 1 {RATIO} (ref 1.1–2.2)
ALP SERPL-CCNC: 92 U/L (ref 45–117)
ALT SERPL-CCNC: 48 U/L (ref 12–78)
ANION GAP SERPL CALC-SCNC: 7 MMOL/L (ref 5–15)
AST SERPL-CCNC: 18 U/L (ref 15–37)
ATRIAL RATE: 79 BPM
BASOPHILS # BLD: 0.1 K/UL (ref 0–0.1)
BASOPHILS NFR BLD: 1 % (ref 0–1)
BILIRUB SERPL-MCNC: 0.5 MG/DL (ref 0.2–1)
BNP SERPL-MCNC: 96 PG/ML
BUN SERPL-MCNC: 19 MG/DL (ref 6–20)
BUN/CREAT SERPL: 16 (ref 12–20)
CALCIUM SERPL-MCNC: 8.8 MG/DL (ref 8.5–10.1)
CALCULATED P AXIS, ECG09: 43 DEGREES
CALCULATED R AXIS, ECG10: 9 DEGREES
CALCULATED T AXIS, ECG11: 112 DEGREES
CHLORIDE SERPL-SCNC: 104 MMOL/L (ref 97–108)
CO2 SERPL-SCNC: 27 MMOL/L (ref 21–32)
COMMENT, HOLDF: NORMAL
CREAT SERPL-MCNC: 1.22 MG/DL (ref 0.7–1.3)
DIAGNOSIS, 93000: NORMAL
DIFFERENTIAL METHOD BLD: ABNORMAL
EOSINOPHIL # BLD: 0.3 K/UL (ref 0–0.4)
EOSINOPHIL NFR BLD: 4 % (ref 0–7)
ERYTHROCYTE [DISTWIDTH] IN BLOOD BY AUTOMATED COUNT: 13.9 % (ref 11.5–14.5)
GLOBULIN SER CALC-MCNC: 3.5 G/DL (ref 2–4)
GLUCOSE SERPL-MCNC: 282 MG/DL (ref 65–100)
HCT VFR BLD AUTO: 36.9 % (ref 36.6–50.3)
HGB BLD-MCNC: 12.6 G/DL (ref 12.1–17)
IMM GRANULOCYTES # BLD AUTO: 0.1 K/UL (ref 0–0.04)
IMM GRANULOCYTES NFR BLD AUTO: 1 % (ref 0–0.5)
LIPASE SERPL-CCNC: 78 U/L (ref 73–393)
LYMPHOCYTES # BLD: 1.5 K/UL (ref 0.8–3.5)
LYMPHOCYTES NFR BLD: 20 % (ref 12–49)
MCH RBC QN AUTO: 28.6 PG (ref 26–34)
MCHC RBC AUTO-ENTMCNC: 34.1 G/DL (ref 30–36.5)
MCV RBC AUTO: 83.7 FL (ref 80–99)
MONOCYTES # BLD: 0.6 K/UL (ref 0–1)
MONOCYTES NFR BLD: 8 % (ref 5–13)
NEUTS SEG # BLD: 4.9 K/UL (ref 1.8–8)
NEUTS SEG NFR BLD: 66 % (ref 32–75)
NRBC # BLD: 0 K/UL (ref 0–0.01)
NRBC BLD-RTO: 0 PER 100 WBC
P-R INTERVAL, ECG05: 156 MS
PLATELET # BLD AUTO: 180 K/UL (ref 150–400)
PMV BLD AUTO: 10.2 FL (ref 8.9–12.9)
POTASSIUM SERPL-SCNC: 4 MMOL/L (ref 3.5–5.1)
PROT SERPL-MCNC: 6.9 G/DL (ref 6.4–8.2)
Q-T INTERVAL, ECG07: 368 MS
QRS DURATION, ECG06: 90 MS
QTC CALCULATION (BEZET), ECG08: 421 MS
RBC # BLD AUTO: 4.41 M/UL (ref 4.1–5.7)
SAMPLES BEING HELD,HOLD: NORMAL
SARS-COV-2, COV2: NORMAL
SODIUM SERPL-SCNC: 138 MMOL/L (ref 136–145)
TROPONIN-HIGH SENSITIVITY: 12 NG/L (ref 0–76)
TROPONIN-HIGH SENSITIVITY: 14 NG/L (ref 0–76)
VENTRICULAR RATE, ECG03: 79 BPM
WBC # BLD AUTO: 7.3 K/UL (ref 4.1–11.1)

## 2021-12-15 PROCEDURE — 74011000250 HC RX REV CODE- 250: Performed by: EMERGENCY MEDICINE

## 2021-12-15 PROCEDURE — 80053 COMPREHEN METABOLIC PANEL: CPT

## 2021-12-15 PROCEDURE — 74177 CT ABD & PELVIS W/CONTRAST: CPT

## 2021-12-15 PROCEDURE — 74011250637 HC RX REV CODE- 250/637: Performed by: EMERGENCY MEDICINE

## 2021-12-15 PROCEDURE — 83880 ASSAY OF NATRIURETIC PEPTIDE: CPT

## 2021-12-15 PROCEDURE — 71275 CT ANGIOGRAPHY CHEST: CPT

## 2021-12-15 PROCEDURE — 74011000636 HC RX REV CODE- 636: Performed by: EMERGENCY MEDICINE

## 2021-12-15 PROCEDURE — U0005 INFEC AGEN DETEC AMPLI PROBE: HCPCS

## 2021-12-15 PROCEDURE — 96374 THER/PROPH/DIAG INJ IV PUSH: CPT

## 2021-12-15 PROCEDURE — C9113 INJ PANTOPRAZOLE SODIUM, VIA: HCPCS | Performed by: EMERGENCY MEDICINE

## 2021-12-15 PROCEDURE — 93005 ELECTROCARDIOGRAM TRACING: CPT

## 2021-12-15 PROCEDURE — 71045 X-RAY EXAM CHEST 1 VIEW: CPT

## 2021-12-15 PROCEDURE — 84484 ASSAY OF TROPONIN QUANT: CPT

## 2021-12-15 PROCEDURE — 85025 COMPLETE CBC W/AUTO DIFF WBC: CPT

## 2021-12-15 PROCEDURE — 83690 ASSAY OF LIPASE: CPT

## 2021-12-15 PROCEDURE — 74011250636 HC RX REV CODE- 250/636: Performed by: EMERGENCY MEDICINE

## 2021-12-15 PROCEDURE — 99285 EMERGENCY DEPT VISIT HI MDM: CPT

## 2021-12-15 PROCEDURE — 36415 COLL VENOUS BLD VENIPUNCTURE: CPT

## 2021-12-15 RX ORDER — AZITHROMYCIN 250 MG/1
TABLET, FILM COATED ORAL
Qty: 6 TABLET | Refills: 0 | Status: SHIPPED | OUTPATIENT
Start: 2021-12-15 | End: 2022-01-14 | Stop reason: ALTCHOICE

## 2021-12-15 RX ORDER — PANTOPRAZOLE SODIUM 40 MG/10ML
40 INJECTION, POWDER, LYOPHILIZED, FOR SOLUTION INTRAVENOUS
Status: COMPLETED | OUTPATIENT
Start: 2021-12-15 | End: 2021-12-15

## 2021-12-15 RX ORDER — BENZONATATE 200 MG/1
200 CAPSULE ORAL
Qty: 15 CAPSULE | Refills: 1 | Status: SHIPPED | OUTPATIENT
Start: 2021-12-15 | End: 2022-01-14 | Stop reason: ALTCHOICE

## 2021-12-15 RX ORDER — DICYCLOMINE HYDROCHLORIDE 20 MG/1
20 TABLET ORAL
Status: COMPLETED | OUTPATIENT
Start: 2021-12-15 | End: 2021-12-15

## 2021-12-15 RX ORDER — LORAZEPAM 1 MG/1
1 TABLET ORAL
Qty: 10 TABLET | Refills: 0 | Status: SHIPPED | OUTPATIENT
Start: 2021-12-15 | End: 2022-01-14 | Stop reason: ALTCHOICE

## 2021-12-15 RX ORDER — AZITHROMYCIN 250 MG/1
TABLET, FILM COATED ORAL
Qty: 6 TABLET | Refills: 0 | Status: SHIPPED | OUTPATIENT
Start: 2021-12-15 | End: 2021-12-15 | Stop reason: SDUPTHER

## 2021-12-15 RX ORDER — HYDROXYZINE 25 MG/1
50 TABLET, FILM COATED ORAL
Status: COMPLETED | OUTPATIENT
Start: 2021-12-15 | End: 2021-12-15

## 2021-12-15 RX ADMIN — IOPAMIDOL 100 ML: 755 INJECTION, SOLUTION INTRAVENOUS at 09:17

## 2021-12-15 RX ADMIN — DICYCLOMINE HYDROCHLORIDE 20 MG: 20 TABLET ORAL at 06:08

## 2021-12-15 RX ADMIN — PANTOPRAZOLE SODIUM 40 MG: 40 INJECTION, POWDER, FOR SOLUTION INTRAVENOUS at 06:09

## 2021-12-15 RX ADMIN — LIDOCAINE HYDROCHLORIDE 40 ML: 20 SOLUTION TOPICAL at 06:08

## 2021-12-15 RX ADMIN — HYDROXYZINE HYDROCHLORIDE 50 MG: 25 TABLET, FILM COATED ORAL at 06:07

## 2021-12-15 NOTE — ED NOTES
PT to room by EMS and self ambulates to bed. PT here today with complaints of sob, anxiety and abd pain. PT states that he ws laying in bed and got SOB and abd pain that was not relieved with movement. PT states that he has a history of GERD so he thought it might be indigestion and felt like \"my stomach was going to come up through my mouth\". PT states that he also has had lots of burping since this started. PT states that he is tender to palpation in the abd and feels distended on the sides. Pt states that he also felt Sob and anxious due to the pain. EMS put pt on 2L NC for comfort due to the anxiety and pt states that he is feeling better now on room air. PT states that he is also having 6/10 temporal headache that started with this episode as well. PT states that he was seen her a week ago for indigestion. PT states that he had a stent a month ago for a blockage. PT denies n/v/d, fever, cough, passing out, urinary symptoms. Pt ANOX4, respirations even and unlabored, skin warm dry and intact, NAD noted.

## 2021-12-15 NOTE — DISCHARGE INSTRUCTIONS
It was a pleasure taking care of you in our Emergency Department today. We know that when you come to [facility], you are entrusting us with your health, comfort, and safety. Our physicians and nurses honor that trust, and truly appreciate the opportunity to care for you and your loved ones. We also value your feedback. If you receive a survey about your Emergency Department experience today, please fill it out. We care about our patients' feedback, and we listen to what you have to say. Thank you!       Dr. Ankush Delatorre MD.

## 2021-12-15 NOTE — ED PROVIDER NOTES
EMERGENCY DEPARTMENT HISTORY AND PHYSICAL EXAM     ----------------------------------------------------------------------------  Please note that this dictation was completed with [employer], the computer voice recognition software. Quite often unanticipated grammatical, syntax, homophones, and other interpretive errors are inadvertently transcribed by the computer software. Please disregard these errors. Please excuse any errors that have escaped final proofreading  ----------------------------------------------------------------------------      Date: 12/15/2021  Patient Name: Dre Guerrero    History of Presenting Illness     Chief Complaint   Patient presents with    Chest Pain     pt presents w/ c/o cp since 0100. pt had recent stints placed. pt reports CP and SOB        History Provided By:  Patient    HPI: Dre Guerrero is a 42 y.o. male, with significant pmhx of CAD with previous stenting, anxiety, hypertension, GERD, type 1 diabetes who presents via EMS to the ED with c/o shortness of breath started earlier in the evening with associated abdominal distention. Notes he is been having longstanding issues with his abdomen for which she sees GI but notes that they \"cannot figure out what is going on with him. \"  He notes that he ate dinner and had \"steak\" with food. Stomach was pushing up to his chest.  Denies associated nausea or vomiting. Patient also specifically denies any associated fevers, chills,  urinary sxs, or headache. Social Hx: denies tobacco  denies EtOH , denies recreational/Illicit Drugs    There are no other complaints, changes, or physical findings at this time. PCP: Kamron Shrestha MD    Allergies   Allergen Reactions    Morphine Nausea and Vomiting    Penicillin G Swelling    Percocet [Oxycodone-Acetaminophen] Hives and Nausea and Vomiting     Pt is allergic to Oxycodone, has previously tolerated Tylenol and Hydrocodone.     Sulfa (Sulfonamide Antibiotics) Swelling    Tramadol Nausea Only     Nausea and headache         Current Facility-Administered Medications   Medication Dose Route Frequency Provider Last Rate Last Admin    iopamidoL (ISOVUE-370) 76 % injection 100 mL  100 mL IntraVENous RAD ONCE Blayne Burden MD         Current Outpatient Medications   Medication Sig Dispense Refill    rosuvastatin (CRESTOR) 20 mg tablet Take 20 mg by mouth nightly.  insulin glargine (Lantus U-100 Insulin) 100 unit/mL injection INJECT 95 UNITS SUBCUTANEOUSLY IN THE MORNING AND 95 UNITS AT NIGHT (Patient taking differently: INJECT 90 UNITS SUBCUTANEOUSLY IN THE MORNING AND 90 UNITS AT NIGHT) 60 mL 0    sertraline (ZOLOFT) 100 mg tablet Take 1 tablet by mouth once daily 30 Tablet 4    clopidogreL (PLAVIX) 75 mg tab Take 1 Tablet by mouth daily. 30 Tablet 12    lisinopriL (PRINIVIL, ZESTRIL) 20 mg tablet Take 1 Tablet by mouth daily. 30 Tablet 12    ofloxacin (FLOXIN) 0.3 % ophthalmic solution  (Patient not taking: Reported on 12/3/2021)      busPIRone (BUSPAR) 15 mg tablet Take 1 tablet by mouth twice daily 60 Tablet 5    metFORMIN (GLUCOPHAGE) 500 mg tablet Take 1 Tablet by mouth two (2) times daily (with meals). 180 Tablet 3    insulin regular (HumuLIN R Regular U-100 Insuln) 100 unit/mL injection 30 units with breakfast,30 units with lunch and  30 units with dinner plus correction. 160 units per day max (Patient taking differently: 20 units with breakfast, 20 units with lunch and  20 units with dinner plus correction. 160 units per day max) 50 mL 3    levothyroxine (SYNTHROID) 112 mcg tablet TAKE 1 TABLET BY MOUTH ONCE DAILY BEFORE BREAKFAST 90 Tablet 2    flash glucose sensor (FreeStyle Mikal 2 Sensor) kit Change sensor every 14 days 2 Kit 3    flash glucose scanning reader (FreeStyle Mikal 2 Sparks) misc Change sensor every 14 days 1 Each 0    cholecalciferol (Vitamin D3) (5000 Units/125 mcg) tab tablet Take 1 Tab by mouth daily.  90 Tab 1    Insulin Syringe-Needle U-100 (BD Insulin Syringe) 1 mL 25 x 1\" syrg Use for drawing up/injecting testosterone once weekly. 100 Each 3    Syringe with Needle, Disp, 1 mL 25 gauge x 5/8\" syrg Use with testosterone once every week 50 Syringe 3    LORazepam (ATIVAN) 1 mg tablet Take 1 Tab by mouth every four (4) hours as needed for Anxiety. Max Daily Amount: 6 mg. 30 Tab 5    aluminum & magnesium hydroxide-simethicone (Maalox Maximum Strength) 400-400-40 mg/5 mL suspension Take 10 mL by mouth every six (6) hours as needed for Indigestion.  Omeprazole delayed release (PRILOSEC D/R) 20 mg tablet Take 1 Tab by mouth daily. 20 Tab 0    nitroglycerin (NITROSTAT) 0.4 mg SL tablet Take 1 Tab by mouth every five (5) minutes as needed for Chest Pain. Sit down then put one tab under the tongue every 5 minutes as needed 1 Bottle 0    metoprolol succinate (TOPROL-XL) 25 mg XL tablet Take 1 Tab by mouth daily. (Patient taking differently: Take 25 mg by mouth two (2) times a day.) 90 Tab 3    aspirin delayed-release 81 mg tablet Take 1 Tab by mouth daily.  27 Tab 0       Past History     Past Medical History:  Past Medical History:   Diagnosis Date    Anxiety and depression     Bleeding of eye, left     8/17/21 pt reports receiving injections in right eye for beeding    Chronic headaches 2007    COVID-19 12/20/2020    Diabetes type 1, uncontrolled ([address]     since 8years old    GERD (gastroesophageal reflux disease)     Hypercholesterolemia     Hypertension     Hypothyroidism     MI (myocardial infarction) ([address]     as of 8/17/21 pt reports 8 stents total    WALLY on CPAP        Past Surgical History:  Past Surgical History:   Procedure Laterality Date    HX CARPAL TUNNEL RELEASE Left 2012    HX CARPAL TUNNEL RELEASE Right 2018    CTE trigger thumb and index finger    HX HEART CATHETERIZATION      HX HEENT      HX LAP CHOLECYSTECTOMY  8/26/14    Dr Mitchell Buck    HX WISDOM TEETH EXTRACTION         Family History:  Family History Problem Relation Age of Onset    Diabetes Mother     Hypertension Mother     Heart Disease Father     Cancer Father     Diabetes Father     Diabetes Paternal Aunt     Diabetes Maternal Grandmother     Thyroid Cancer Maternal Grandmother     Kidney Disease Maternal Grandmother     Arthritis Maternal Grandmother     Heart Disease Maternal Grandfather     High Cholesterol Maternal Grandfather     Hypertension Maternal Grandfather     Diabetes Paternal Grandmother     Hemophilia Paternal Grandfather        Social History:  Social History     Tobacco Use    Smoking status: Former Smoker     Packs/day: 0.00     Years: 14.00     Pack years: 0.00     Quit date: 2014     Years since quittin.9    Smokeless tobacco: Never Used   Vaping Use    Vaping Use: Never used   Substance Use Topics    Alcohol use: No    Drug use: No       Allergies: Allergies   Allergen Reactions    Morphine Nausea and Vomiting    Penicillin G Swelling    Percocet [Oxycodone-Acetaminophen] Hives and Nausea and Vomiting     Pt is allergic to Oxycodone, has previously tolerated Tylenol and Hydrocodone.  Sulfa (Sulfonamide Antibiotics) Swelling    Tramadol Nausea Only     Nausea and headache           Review of Systems   Review of Systems   Constitutional: Negative for chills and fever. HENT: Negative. Eyes: Negative. Respiratory: Positive for chest tightness and shortness of breath. Negative for cough. Cardiovascular: Negative for chest pain and leg swelling. Gastrointestinal: Positive for abdominal distention. Negative for abdominal pain, diarrhea, nausea and vomiting. Endocrine: Negative. Genitourinary: Negative for difficulty urinating and dysuria. Musculoskeletal: Negative for myalgias. Skin: Negative. Neurological: Negative. Psychiatric/Behavioral: Negative. All other systems reviewed and are negative. Physical Exam   Physical Exam  Vitals and nursing note reviewed. Constitutional:       General: He is not in acute distress. Appearance: He is well-developed. He is obese. He is not diaphoretic. HENT:      Head: Normocephalic and atraumatic. Nose: Nose normal.      Mouth/Throat:      Pharynx: No oropharyngeal exudate. Eyes:      Conjunctiva/sclera: Conjunctivae normal.      Pupils: Pupils are equal, round, and reactive to light. Neck:      Vascular: No JVD. Cardiovascular:      Rate and Rhythm: Normal rate and regular rhythm. Heart sounds: Normal heart sounds. No murmur heard. No friction rub. Pulmonary:      Effort: Pulmonary effort is normal. No respiratory distress. Breath sounds: Normal breath sounds. No stridor. No wheezing or rales. Abdominal:      General: Bowel sounds are normal. There is no distension. Palpations: Abdomen is soft. Tenderness: There is no abdominal tenderness. There is no rebound. Musculoskeletal:         General: No tenderness. Normal range of motion. Cervical back: Normal range of motion and neck supple. Skin:     General: Skin is warm and dry. Findings: No rash. Neurological:      Mental Status: He is alert and oriented to person, place, and time. Cranial Nerves: No cranial nerve deficit. Psychiatric:         Speech: Speech normal.         Behavior: Behavior normal.         Thought Content:  Thought content normal.         Judgment: Judgment normal.           Diagnostic Study Results     Labs -     Recent Results (from the past 12 hour(s))   EKG, 12 LEAD, INITIAL    Collection Time: 12/15/21  3:38 AM   Result Value Ref Range    Ventricular Rate 79 BPM    Atrial Rate 79 BPM    P-R Interval 156 ms    QRS Duration 90 ms    Q-T Interval 368 ms    QTC Calculation (Bezet) 421 ms    Calculated P Axis 43 degrees    Calculated R Axis 9 degrees    Calculated T Axis 112 degrees    Diagnosis       Normal sinus rhythm  Left ventricular hypertrophy with repolarization abnormality  When compared with ECG of 08-DEC-2021 11:57,  No significant change was found     CBC WITH AUTOMATED DIFF    Collection Time: 12/15/21  3:41 AM   Result Value Ref Range    WBC 7.3 4.1 - 11.1 K/uL    RBC 4.41 4.10 - 5.70 M/uL    HGB 12.6 12.1 - 17.0 g/dL    HCT 36.9 36.6 - 50.3 %    MCV 83.7 80.0 - 99.0 FL    MCH 28.6 26.0 - 34.0 PG    MCHC 34.1 30.0 - 36.5 g/dL    RDW 13.9 11.5 - 14.5 %    PLATELET 826 689 - 097 K/uL    MPV 10.2 8.9 - 12.9 FL    NRBC 0.0 0  WBC    ABSOLUTE NRBC 0.00 0.00 - 0.01 K/uL    NEUTROPHILS 66 32 - 75 %    LYMPHOCYTES 20 12 - 49 %    MONOCYTES 8 5 - 13 %    EOSINOPHILS 4 0 - 7 %    BASOPHILS 1 0 - 1 %    IMMATURE GRANULOCYTES 1 (H) 0.0 - 0.5 %    ABS. NEUTROPHILS 4.9 1.8 - 8.0 K/UL    ABS. LYMPHOCYTES 1.5 0.8 - 3.5 K/UL    ABS. MONOCYTES 0.6 0.0 - 1.0 K/UL    ABS. EOSINOPHILS 0.3 0.0 - 0.4 K/UL    ABS. BASOPHILS 0.1 0.0 - 0.1 K/UL    ABS. IMM. GRANS. 0.1 (H) 0.00 - 0.04 K/UL    DF AUTOMATED     METABOLIC PANEL, COMPREHENSIVE    Collection Time: 12/15/21  3:41 AM   Result Value Ref Range    Sodium 138 136 - 145 mmol/L    Potassium 4.0 3.5 - 5.1 mmol/L    Chloride 104 97 - 108 mmol/L    CO2 27 21 - 32 mmol/L    Anion gap 7 5 - 15 mmol/L    Glucose 282 (H) 65 - 100 mg/dL    BUN 19 6 - 20 MG/DL    Creatinine 1.22 0.70 - 1.30 MG/DL    BUN/Creatinine ratio 16 12 - 20      GFR est AA >60 >60 ml/min/1.73m2    GFR est non-AA >60 >60 ml/min/1.73m2    Calcium 8.8 8.5 - 10.1 MG/DL    Bilirubin, total 0.5 0.2 - 1.0 MG/DL    ALT (SGPT) 48 12 - 78 U/L    AST (SGOT) 18 15 - 37 U/L    Alk.  phosphatase 92 45 - 117 U/L    Protein, total 6.9 6.4 - 8.2 g/dL    Albumin 3.4 (L) 3.5 - 5.0 g/dL    Globulin 3.5 2.0 - 4.0 g/dL    A-G Ratio 1.0 (L) 1.1 - 2.2     NT-PRO BNP    Collection Time: 12/15/21  3:41 AM   Result Value Ref Range    NT pro-BNP 96 <125 PG/ML   TROPONIN-HIGH SENSITIVITY    Collection Time: 12/15/21  3:41 AM   Result Value Ref Range    Troponin-High Sensitivity 12 0 - 76 ng/L   SAMPLES BEING HELD    Collection Time: 12/15/21  3:41 AM   Result Value Ref Range    SAMPLES BEING HELD RD BL     COMMENT        Add-on orders for these samples will be processed based on acceptable specimen integrity and analyte stability, which may vary by analyte. TROPONIN-HIGH SENSITIVITY    Collection Time: 12/15/21  6:02 AM   Result Value Ref Range    Troponin-High Sensitivity 14 0 - 76 ng/L   LIPASE    Collection Time: 12/15/21  6:02 AM   Result Value Ref Range    Lipase 78 73 - 393 U/L       Radiologic Studies -   XR CHEST PORT   Final Result   Normal chest.      CTA CHEST W OR W WO CONT    (Results Pending)     CT Results  (Last 48 hours)    None        CXR Results  (Last 48 hours)               12/15/21 0417  XR CHEST PORT Final result    Impression:  Normal chest.       Narrative:  EXAM: XR CHEST PORT       INDICATION: chest pain       COMPARISON: December 8, 2021       FINDINGS: A portable AP radiograph of the chest was obtained at 0405 hours. The   patient is on a cardiac monitor. The lungs are clear. The cardiac and   mediastinal contours and pulmonary vascularity are normal.  The bones and soft   tissues are grossly within normal limits. Medical Decision Making   I am the first provider for this patient. I reviewed the vital signs, available nursing notes, past medical history, past surgical history, family history and social history. Vital Signs-Reviewed the patient's vital signs.   Patient Vitals for the past 12 hrs:   Temp Pulse Resp BP SpO2   12/15/21 0810 97.7 °F (36.5 °C) 72 17 (!) 149/57 94 %   12/15/21 0630 -- 71 18 (!) 127/59 95 %   12/15/21 0615 -- 78 19 (!) 132/57 93 %   12/15/21 0600 -- 77 17 (!) 140/73 98 %   12/15/21 0545 -- 78 24 131/61 95 %   12/15/21 0530 -- 73 18 133/66 93 %   12/15/21 0515 -- 75 21 128/67 94 %   12/15/21 0500 -- 76 21 (!) 142/66 92 %   12/15/21 0445 -- 75 21 (!) 149/67 92 %   12/15/21 0430 -- 72 22 (!) 143/58 92 %   12/15/21 0415 -- 75 18 139/62 94 %   12/15/21 0400 -- 76 19 (!) 147/69 94 %   12/15/21 0345 -- 78 16 138/67 95 %   12/15/21 0340 -- -- -- -- 97 %   12/15/21 0339 -- 79 16 -- 95 %   12/15/21 0334 -- -- -- 136/72 --   12/15/21 0326 98.4 °F (36.9 °C) 77 18 (!) 142/75 97 %       Pulse Oximetry Analysis - 95% on RA, normal  Rate: 77 bpm  Rhythm: nsr      Provider Notes (Medical Decision Making):     DDX:  ACS, arrhythmia, gastroparesis, PE, anxiety    Plan:  Labs, EKG, chest x-ray    Impression:  Shortness of breath, anxiety    ED Course:   Initial assessment performed. The patients presenting problems have been discussed, and they are in agreement with the care plan formulated and outlined with them. I have encouraged them to ask questions as they arise throughout their visit. I reviewed the nursing notes and and vital signs from today's visit, as well as the electronic medical record system for any past medical records that were available that may contribute to the patients current condition, including previous ER visits for similar symptoms. And that previous stenting and NSTEMI, recent 2 months    Nursing notes will be reviewed as they become available in realtime while the pt has been in the ED. Radha Olivas MD    I personally reviewed/interpreted pt's imaging. Agree with official read by radiology as noted above. Radha Olivas MD      8:16 AM  Patient's presentation, labs/imaging and plan of care was reviewed with Dr. Trish Feliz as part of sign out. They will f/u on cta as part of the plan discussed with the patient. Dr. El Stephenson assistance in completion of this plan is greatly appreciated but it should be noted that I will be the provider of record for this patient. Radha Olivas MD          ED Course as of 12/16/21 0333   Wed Dec 15, 2021   1242 CT results show patchy infiltrates in the bilateral lower lungs, no PE, nonacute abdomen. Discussed with patient, prescribing antibiotics, Ativan for anxiety and Tessalon Perles.   Patient to get SARS test prior to discharge. [TL]      ED Course User Index  [TL] Mikala Luo MD       Critical Care Time:     none      Diagnosis     Clinical Impression:   1. SOB (shortness of breath)    2. Anxiety state        PLAN:  1. Current Discharge Medication List        2. Follow-up Information     Follow up With Specialties Details Why Contact Info    Andra Simon MD Internal Medicine Schedule an appointment as soon as possible for a visit in 2 days  [address] [facility]  [address] [facility]  [address] [place]      Eduin Ochoa MD Cardio Vascular Surgery, Interventional Cardiology, Cardiology Schedule an appointment as soon as possible for a visit in 2 days  [address] [facility] [address]  [phone]      [facility] EMERGENCY DEPT Emergency Medicine  As needed [address] [facility]  [address]  [phone]        Return to ED if worse     Disposition:    The patient's results have been reviewed with family and/or caregiver. They verbally convey their understanding and agreement of the patient's signs, symptoms, diagnosis, treatment and prognosis and additionally agree to follow up as recommended in the discharge instructions or to return to the Emergency Room should the patient's condition change prior to their follow-up appointment. The family and/or caregiver verbally agrees with the care-plan and all of their questions have been answered. The discharge instructions have also been provided to the them with educational information regarding the patient's diagnosis as well a list of reasons why the patient would want to return to the ER prior to their follow-up appointment should their condition change.   Carlee Solano MD

## 2021-12-15 NOTE — ED NOTES
Bedside and Verbal shift change report given to Fabiola Chavez (oncoming nurse) by Dionicio Oseguera (offgoing nurse). Report included the following information SBAR, ED Summary, Intake/Output and MAR.

## 2021-12-16 ENCOUNTER — APPOINTMENT (OUTPATIENT)
Dept: CARDIAC REHAB | Age: 41
End: 2021-12-16
Payer: COMMERCIAL

## 2021-12-16 ENCOUNTER — PATIENT OUTREACH (OUTPATIENT)
Dept: CASE MANAGEMENT | Age: 41
End: 2021-12-16

## 2021-12-16 LAB
SARS-COV-2, XPLCVT: NOT DETECTED
SOURCE, COVRS: NORMAL

## 2021-12-16 NOTE — PROGRESS NOTES
Patient contacted regarding COVID-19 risk. Discussed COVID-19 related testing which was available at this time. Test results were negative. Patient informed of results, if available? yes. Ambulatory Care Manager contacted the patient by telephone to perform post discharge assessment. Call within 2 business days of discharge: Yes Verified name and  with patient as identifiers. Provided introduction to self, and explanation of the CTN/ACM role, and reason for call due to risk factors for infection and/or exposure to COVID-19. Symptoms reviewed with patient who verbalized the following symptoms: shortness of breath, stomach pain. Due to no new or worsening symptoms encounter was not routed to provider for escalation. Discussed follow-up appointments. If no appointment was previously scheduled, appointment scheduling offered:  yes.   [facility] follow up appointment(s):   Future Appointments   Date Time Provider Aileen Smith   2021  3:00 PM [facility]UTRITION [facility]   2021  2:30 PM MRM CARDIOPULM EXERCISE [facility]   2021  2:30 PM MRM CARDIOPULM EXERCISE [facility]   2021  2:30 PM MR[facility]ARD[facility]OPULM EXERCISE [facility]   2021  2:30 PM MRM CARDIOPULM EXERCISE [facility]   1/3/2022  2:30 PM MRM CARDIOPULM EXERCISE [facility]   2022  2:30 PM MRM CARDIOPULM EXERCISE [facility]   1/10/2022  2:30 PM MRM CARDIOPULM EXERCISE [facility]   2022  2:30 PM MRM CARDIOPULM EXERCISE [facility]   2022  2:30 PM MRM CARDIOPULM EXERCISE [facility]   2022 11:10 AM Jeny Hurst MD [facility] AM[facility]   2022  2:30 PM MRM CARDIOPULM EXERCISE [facility]   2022  2:30 PM MRM CARDIOPULM EXERCISE [facility]   2022  2:00 PM Dionne Duarte MD [facility] AMB   2022  2:30 PM MRM CARDIOPULM EXERCISE [facility]   2022  2:30 PM EDEN CARDIOPULM EXERCISE [facility]   2/3/2022  2:30 PM MRM CARDIOPULM EXERCISE [facility]   2/7/2022  2:30 PM MRM CARDIOPULM EXERCISE [facility]   2/10/2022  2:30 PM MRM CARDIOPULM EXERCISE [facility]   2/14/2022  2:30 PM MRM CARDIOPULM EXERCISE [facility]   2/17/2022  2:30 PM MRM CARDIOPULM EXERCISE [facility]   2/18/2022 11:10 AM Jyoti Munson MD [facility] [address] BS AMB   2/21/2022  2:30 PM MRM CARDIOPULM EXERCISE [facility]   2/24/2022  2:30 PM MRM CARDIOPULM EXERCISE [facility]   2/28/2022  2:30 PM MRM CARDIOPULM EXERCISE [facility]   3/3/2022  2:30 PM MRM CARDIOPULM EXERCISE [facility]   3/7/2022  2:30 PM MRM CARDIOPULM EXERCISE [facility]   3/10/2022  2:30 PM MRM CARDIOPULM EXERCISE [facility]   3/14/2022  2:30 PM MRM CARDIOPULM EXERCISE [facility]   3/17/2022  2:30 PM MRM CARDIOPULM EXERCISE [facility]   3/21/2022  2:30 PM MRM CARDIOPULM EXERCISE [facility]   3/24/2022  2:30 PM MRM CARDIOPULM EXERCISE [facility]   3/28/2022  2:30 PM MRM CARDIOPULM EXERCISE [facility]   3/31/2022  2:30 PM MRM [id number] [facility]   4/4/2022  2:30 PM MRM CARDIOPULM EXERCISE [facility]   4/7/2022  2:30 PM MRM CARDIOPULM EXERCISE [facility]   4/11/2022  2:30 PM MRM CARDIOPULM EXERCISE [facility]   6/13/2022  1:00 PM Suzie Gifford NP [facility] BS AMB     [facility] follow up appointment(s): Interventions to address risk factors: Scheduled appointment with PCP-Patient will call toSchedule follow up with PCP, GI and cardiology. Advance Care Planning:   Does patient have an Advance Directive: not on file. Educated patient about risk for severe COVID-19 due to risk factors according to CDC guidelines.  ACM reviewed discharge instructions, medical action plan and red flag symptoms with the patient who verbalized understanding. Discussed COVID vaccination status: yes. Education provided on COVID-19 vaccination as appropriate. Discussed exposure protocols and quarantine with CDC Guidelines. Patient was given an opportunity to verbalize any questions and concerns and agrees to contact ACM or health care provider for questions related to their healthcare. Reviewed and educated patient on any new and changed medications related to discharge diagnosis     Was patient discharged with a pulse oximeter? no    ACM provided contact information. Plan for follow-up call in 5-7 days based on severity of symptoms and risk factors.

## 2021-12-20 ENCOUNTER — APPOINTMENT (OUTPATIENT)
Dept: CARDIAC REHAB | Age: 41
End: 2021-12-20
Payer: COMMERCIAL

## 2021-12-23 ENCOUNTER — APPOINTMENT (OUTPATIENT)
Dept: CARDIAC REHAB | Age: 41
End: 2021-12-23
Payer: COMMERCIAL

## 2021-12-23 ENCOUNTER — PATIENT OUTREACH (OUTPATIENT)
Dept: CASE MANAGEMENT | Age: 41
End: 2021-12-23

## 2021-12-23 NOTE — PROGRESS NOTES
Patient resolved from [address] episode on 12/23/21. Discussed COVID-19 related testing which was available at this time. Test results were negative. Patient informed of results, if available? yes     Patient/family has been provided the following resources and education related to COVID-19:                         Signs, symptoms and red flags related to COVID-19            CDC exposure and quarantine guidelines            [facility] exposure contact - [phone]            Contact for their local Department of Health                 Patient currently reports that the following symptoms have improved: SOB/Abdominal Pian . No further outreach scheduled with this CTN/ACM/LPN/HC/ MA. Episode of Care resolved. Patient has this CTN/ACM/LPN/HC/MA contact information if future needs arise.

## 2022-01-03 ENCOUNTER — APPOINTMENT (OUTPATIENT)
Dept: CARDIAC REHAB | Age: 42
End: 2022-01-03

## 2022-01-06 ENCOUNTER — APPOINTMENT (OUTPATIENT)
Dept: CARDIAC REHAB | Age: 42
End: 2022-01-06

## 2022-01-10 ENCOUNTER — APPOINTMENT (OUTPATIENT)
Dept: CARDIAC REHAB | Age: 42
End: 2022-01-10

## 2022-01-13 ENCOUNTER — APPOINTMENT (OUTPATIENT)
Dept: CARDIAC REHAB | Age: 42
End: 2022-01-13

## 2022-01-13 ENCOUNTER — HOSPITAL ENCOUNTER (OUTPATIENT)
Dept: GENERAL RADIOLOGY | Age: 42
Discharge: HOME | End: 2022-01-13
Payer: COMMERCIAL

## 2022-01-13 ENCOUNTER — TRANSCRIBE ORDER (OUTPATIENT)
Dept: EMERGENCY DEPT | Age: 42
End: 2022-01-13

## 2022-01-13 DIAGNOSIS — J18.9 PNEUMONIA IN INFECTIOUS DISEASE: ICD-10-CM

## 2022-01-13 DIAGNOSIS — J18.9 PNEUMONIA IN INFECTIOUS DISEASE: Primary | ICD-10-CM

## 2022-01-13 PROCEDURE — 71046 X-RAY EXAM CHEST 2 VIEWS: CPT

## 2022-01-14 ENCOUNTER — OFFICE VISIT (OUTPATIENT)
Dept: FAMILY MEDICINE CLINIC | Age: 42
End: 2022-01-14
Payer: COMMERCIAL

## 2022-01-14 VITALS
HEIGHT: 69 IN | TEMPERATURE: 97.7 F | HEART RATE: 70 BPM | RESPIRATION RATE: 20 BRPM | BODY MASS INDEX: 38.95 KG/M2 | SYSTOLIC BLOOD PRESSURE: 132 MMHG | WEIGHT: 263 LBS | OXYGEN SATURATION: 99 % | DIASTOLIC BLOOD PRESSURE: 65 MMHG

## 2022-01-14 DIAGNOSIS — R35.0 FREQUENCY OF URINATION: ICD-10-CM

## 2022-01-14 DIAGNOSIS — I10 HYPERTENSION, WELL CONTROLLED: ICD-10-CM

## 2022-01-14 DIAGNOSIS — F43.29 ADJUSTMENT DISORDER WITH MIXED EMOTIONAL FEATURES: ICD-10-CM

## 2022-01-14 DIAGNOSIS — K21.00 GASTROESOPHAGEAL REFLUX DISEASE WITH ESOPHAGITIS WITHOUT HEMORRHAGE: ICD-10-CM

## 2022-01-14 DIAGNOSIS — E11.65 POORLY CONTROLLED DIABETES MELLITUS (HCC): Primary | ICD-10-CM

## 2022-01-14 DIAGNOSIS — E78.00 HYPERCHOLESTEROLEMIA: ICD-10-CM

## 2022-01-14 DIAGNOSIS — E03.9 ACQUIRED HYPOTHYROIDISM: ICD-10-CM

## 2022-01-14 PROBLEM — M54.50 LOW BACK PAIN: Status: ACTIVE | Noted: 2022-01-14

## 2022-01-14 LAB
GLUCOSE POC: 275 MG/DL
HBA1C MFR BLD HPLC: 9.1 %

## 2022-01-14 PROCEDURE — 83036 HEMOGLOBIN GLYCOSYLATED A1C: CPT | Performed by: INTERNAL MEDICINE

## 2022-01-14 PROCEDURE — 99214 OFFICE O/P EST MOD 30 MIN: CPT | Performed by: INTERNAL MEDICINE

## 2022-01-14 PROCEDURE — 82962 GLUCOSE BLOOD TEST: CPT | Performed by: INTERNAL MEDICINE

## 2022-01-14 RX ORDER — BUSPIRONE HYDROCHLORIDE 30 MG/1
30 TABLET ORAL DAILY
Qty: 90 TABLET | Refills: 1 | Status: SHIPPED | OUTPATIENT
Start: 2022-01-14 | End: 2022-05-06 | Stop reason: SDUPTHER

## 2022-01-14 RX ORDER — RANITIDINE 150 MG/1
20 TABLET, FILM COATED ORAL DAILY
COMMUNITY
End: 2022-01-14 | Stop reason: DRUGHIGH

## 2022-01-14 RX ORDER — RANITIDINE 300 MG/1
300 TABLET ORAL DAILY
Qty: 30 TABLET | Refills: 3 | Status: SHIPPED | OUTPATIENT
Start: 2022-01-14 | End: 2022-01-19 | Stop reason: ALTCHOICE

## 2022-01-14 NOTE — PROGRESS NOTES
Chief Complaint   Patient presents with   [facility] ED Follow-up     12/15/2021     Medication Refill     HPI:  Juana Acosta is a 42 y.o.  male with h/o CAD with previous stenting, poorly controlled diabetes type 1, hypertension, hypercholesterolemia, hypothyroidism, migraine headaches, depression and anxiety presents ER follow-up. Patient was seen in ER 12/15/2021 for for shortness on breath. He has since been evaluated by his cardiologist. Patient is asymptomatic today. Blood pressure is at goal. Patient says omeprazole not working so he changed to [employer]. He is requesting refill to medication. Patient reports he sees Dr. Karina Saha for asthma; He also follows GI at [facility] due to insurance coverage.     Review of Systems  As per hpi    Past Medical History:   Diagnosis Date    Anxiety and depression     Bleeding of eye, left     21 pt reports receiving injections in right eye for beeding    Chronic headaches     COVID-19 2020    Diabetes type 1, uncontrolled ([address]     since 8years old    GERD (gastroesophageal reflux disease)     Hypercholesterolemia     Hypertension     Hypothyroidism     MI (myocardial infarction) ([address]     as of 21 pt reports 8 stents total    WALLY on CPAP      Past Surgical History:   Procedure Laterality Date    HX CARPAL TUNNEL RELEASE Left     HX CARPAL TUNNEL RELEASE Right     CTE trigger thumb and index finger    HX HEART CATHETERIZATION      HX HEENT      HX LAP CHOLECYSTECTOMY  14    Dr Richard Payment EXTRACTION       Social History     Socioeconomic History    Marital status:    Tobacco Use    Smoking status: Former Smoker     Packs/day: 0.00     Years: 14.00     Pack years: 0.00     Quit date: 2014     Years since quittin.0    Smokeless tobacco: Never Used   Vaping Use    Vaping Use: Never used   Substance and Sexual Activity    Alcohol use: No    Drug use: No    Sexual activity: Not Currently Other Topics Concern     Service No    Blood Transfusions No    Caffeine Concern No    Occupational Exposure No    Hobby Hazards No    Sleep Concern Yes    Stress Concern No    Weight Concern Yes    Special Diet Yes    Back Care Yes    Exercise No    Bike Helmet No    Seat Belt Yes    Self-Exams No     Family History   Problem Relation Age of Onset    Diabetes Mother     Hypertension Mother     Heart Disease Father     Cancer Father     Diabetes Father     Diabetes Paternal Aunt     Diabetes Maternal Grandmother     Thyroid Cancer Maternal Grandmother     Kidney Disease Maternal Grandmother     Arthritis Maternal Grandmother     Heart Disease Maternal Grandfather     High Cholesterol Maternal Grandfather     Hypertension Maternal Grandfather     Diabetes Paternal Grandmother     Hemophilia Paternal Grandfather      Current Outpatient Medications   Medication Sig Dispense Refill    raNITIdine (Zantac) 150 mg tablet Take 20 mg by mouth daily. Indications: heartburn      insulin glargine (Lantus U-100 Insulin) 100 unit/mL injection INJECT 90 UNITS SUBCUTANEOUSLY IN THE MORNING AND 90 UNITS AT NIGHT 60 mL 5    benzonatate (TESSALON) 200 mg capsule Take 1 Capsule by mouth three (3) times daily as needed for Cough for up to 15 doses. 15 Capsule 1    LORazepam (Ativan) 1 mg tablet Take 1 Tablet by mouth every eight (8) hours as needed for Anxiety for up to 10 doses. Max Daily Amount: 3 mg. 10 Tablet 0    azithromycin (Zithromax Z-Brad) 250 mg tablet 2 pills p.o. on day #1; 1 pill p.o. on days 2 through 5. 6 Tablet 0    rosuvastatin (CRESTOR) 20 mg tablet Take 20 mg by mouth nightly.  sertraline (ZOLOFT) 100 mg tablet Take 1 tablet by mouth once daily 30 Tablet 4    clopidogreL (PLAVIX) 75 mg tab Take 1 Tablet by mouth daily. 30 Tablet 12    lisinopriL (PRINIVIL, ZESTRIL) 20 mg tablet Take 1 Tablet by mouth daily.  30 Tablet 12    ofloxacin (FLOXIN) 0.3 % ophthalmic solution       busPIRone (BUSPAR) 15 mg tablet Take 1 tablet by mouth twice daily 60 Tablet 5    metFORMIN (GLUCOPHAGE) 500 mg tablet Take 1 Tablet by mouth two (2) times daily (with meals). 180 Tablet 3    insulin regular (HumuLIN R Regular U-100 Insuln) 100 unit/mL injection 30 units with breakfast,30 units with lunch and  30 units with dinner plus correction. 160 units per day max (Patient taking differently: 20 units with breakfast, 20 units with lunch and  20 units with dinner plus correction. 160 units per day max) 50 mL 3    levothyroxine (SYNTHROID) 112 mcg tablet TAKE 1 TABLET BY MOUTH ONCE DAILY BEFORE BREAKFAST 90 Tablet 2    flash glucose sensor (FreeStyle Mikal 2 Sensor) kit Change sensor every 14 days 2 Kit 3    flash glucose scanning reader (FreeStyle Mikal 2 [place]) misc Change sensor every 14 days 1 Each 0    cholecalciferol (Vitamin D3) (5000 Units/125 mcg) tab tablet Take 1 Tab by mouth daily. 90 Tab 1    Insulin Syringe-Needle U-100 (BD Insulin Syringe) 1 mL 25 x 1\" syrg Use for drawing up/injecting testosterone once weekly. 100 Each 3    Syringe with Needle, Disp, 1 mL 25 gauge x 5/8\" syrg Use with testosterone once every week 50 Syringe 3    aluminum & magnesium hydroxide-simethicone (Maalox Maximum Strength) 400-400-40 mg/5 mL suspension Take 10 mL by mouth every six (6) hours as needed for Indigestion.  nitroglycerin (NITROSTAT) 0.4 mg SL tablet Take 1 Tab by mouth every five (5) minutes as needed for Chest Pain. Sit down then put one tab under the tongue every 5 minutes as needed 1 Bottle 0    metoprolol succinate (TOPROL-XL) 25 mg XL tablet Take 1 Tab by mouth daily. (Patient taking differently: Take 25 mg by mouth two (2) times a day.) 90 Tab 3    aspirin delayed-release 81 mg tablet Take 1 Tab by mouth daily.  30 Tab 0     Allergies   Allergen Reactions    Morphine Nausea and Vomiting    Penicillin G Swelling    Percocet [Oxycodone-Acetaminophen] Hives and Nausea and Vomiting     Pt is allergic to Oxycodone, has previously tolerated Tylenol and Hydrocodone.  Sulfa (Sulfonamide Antibiotics) Swelling    Tramadol Nausea Only     Nausea and headache         Objective:  Visit Vitals  /65   Pulse 70   Temp 97.7 °F (36.5 °C) (Temporal)   Resp 20   Ht 5' 9\" (1.753 m)   Wt 263 lb (119.3 kg)   SpO2 99%   BMI 38.84 kg/m²     Physical Exam:   General appearance - alert, well appearing in no distress  Mental status - alert, oriented to person, place, and time  Chest - clear to auscultation, no wheezes, rales or rhonchi  Heart - normal rate, regular rhythm, no murmurs  Abdomen - soft, nontender, nondistended, no organomegaly  Ext-peripheral pulses normal, no pedal edema  Neuro - no focal findings     Results for orders placed or performed during the hospital encounter of 12/15/21   CBC WITH AUTOMATED DIFF   Result Value Ref Range    WBC 7.3 4.1 - 11.1 K/uL    RBC 4.41 4.10 - 5.70 M/uL    HGB 12.6 12.1 - 17.0 g/dL    HCT 36.9 36.6 - 50.3 %    MCV 83.7 80.0 - 99.0 FL    MCH 28.6 26.0 - 34.0 PG    MCHC 34.1 30.0 - 36.5 g/dL    RDW 13.9 11.5 - 14.5 %    PLATELET 886 763 - 325 K/uL    MPV 10.2 8.9 - 12.9 FL    NRBC 0.0 0  WBC    ABSOLUTE NRBC 0.00 0.00 - 0.01 K/uL    NEUTROPHILS 66 32 - 75 %    LYMPHOCYTES 20 12 - 49 %    MONOCYTES 8 5 - 13 %    EOSINOPHILS 4 0 - 7 %    BASOPHILS 1 0 - 1 %    IMMATURE GRANULOCYTES 1 (H) 0.0 - 0.5 %    ABS. NEUTROPHILS 4.9 1.8 - 8.0 K/UL    ABS. LYMPHOCYTES 1.5 0.8 - 3.5 K/UL    ABS. MONOCYTES 0.6 0.0 - 1.0 K/UL    ABS. EOSINOPHILS 0.3 0.0 - 0.4 K/UL    ABS. BASOPHILS 0.1 0.0 - 0.1 K/UL    ABS. IMM.  GRANS. 0.1 (H) 0.00 - 0.04 K/UL    DF AUTOMATED     METABOLIC PANEL, COMPREHENSIVE   Result Value Ref Range    Sodium 138 136 - 145 mmol/L    Potassium 4.0 3.5 - 5.1 mmol/L    Chloride 104 97 - 108 mmol/L    CO2 27 21 - 32 mmol/L    Anion gap 7 5 - 15 mmol/L    Glucose 282 (H) 65 - 100 mg/dL    BUN 19 6 - 20 MG/DL    Creatinine 1.22 0.70 - 1.30 MG/DL BUN/Creatinine ratio 16 12 - 20      GFR est AA >60 >60 ml/min/1.73m2    GFR est non-AA >60 >60 ml/min/1.73m2    Calcium 8.8 8.5 - 10.1 MG/DL    Bilirubin, total 0.5 0.2 - 1.0 MG/DL    ALT (SGPT) 48 12 - 78 U/L    AST (SGOT) 18 15 - 37 U/L    Alk. phosphatase 92 45 - 117 U/L    Protein, total 6.9 6.4 - 8.2 g/dL    Albumin 3.4 (L) 3.5 - 5.0 g/dL    Globulin 3.5 2.0 - 4.0 g/dL    A-G Ratio 1.0 (L) 1.1 - 2.2     NT-PRO BNP   Result Value Ref Range    NT pro-BNP 96 <125 PG/ML   TROPONIN-HIGH SENSITIVITY   Result Value Ref Range    Troponin-High Sensitivity 12 0 - 76 ng/L   SAMPLES BEING HELD   Result Value Ref Range    SAMPLES BEING HELD RD BL     COMMENT        Add-on orders for these samples will be processed based on acceptable specimen integrity and analyte stability, which may vary by analyte.    TROPONIN-HIGH SENSITIVITY   Result Value Ref Range    Troponin-High Sensitivity 14 0 - 76 ng/L   LIPASE   Result Value Ref Range    Lipase 78 73 - 393 U/L   SARS-COV-2   Result Value Ref Range    SARS-CoV-2 Please find results under separate order     SARS-COV-2   Result Value Ref Range    Specimen source Nasopharyngeal      SARS-CoV-2 Not detected NOTD     EKG, 12 LEAD, INITIAL   Result Value Ref Range    Ventricular Rate 79 BPM    Atrial Rate 79 BPM    P-R Interval 156 ms    QRS Duration 90 ms    Q-T Interval 368 ms    QTC Calculation (Bezet) 421 ms    Calculated P Axis 43 degrees    Calculated R Axis 9 degrees    Calculated T Axis 112 degrees    Diagnosis       Normal sinus rhythm  Left ventricular hypertrophy with repolarization abnormality  When compared with ECG of 08-DEC-2021 11:57,  No significant change was found  Confirmed by Bert Carter ([id number]) on 12/15/2021 8:53:05 AM       Assessment/Plan:  Diagnoses and all orders for this visit:    Poorly controlled diabetes mellitus ([facility]  -     AMB POC HEMOGLOBIN A1C  -     AMB POC GLUCOSE BLOOD, BY GLUCOSE MONITORING DEVICE  -     METABOLIC PANEL, COMPREHENSIVE; Future    Acquired hypothyroidism  -     TSH 3RD GENERATION; Future    Hypertension, well controlled  -     METABOLIC PANEL, COMPREHENSIVE; Future    Gastroesophageal reflux disease with esophagitis without hemorrhage  -     raNITIdine (ZANTAC) 300 mg tab; Take 1 Tablet by mouth daily. , Normal, Disp-30 Tablet, R-3  -     METABOLIC PANEL, COMPREHENSIVE; Future    Adjustment disorder with mixed emotional features  -     busPIRone (BUSPAR) 30 mg tablet; Take 1 Tablet by mouth daily. , Normal, Disp-90 Tablet, R-1  -     METABOLIC PANEL, COMPREHENSIVE; Future  -     REFERRAL TO PSYCHIATRY    Frequency of urination  -     URINALYSIS W/ RFLX MICROSCOPIC; Future    Hypercholesterolemia  -     LIPID PANEL; Future      Patient Instructions      Ranitidine (Zantac, Zantac 150, Zantac 300, Zantac 75) - (By mouth)   Why this medicine is used:   Treats ulcers, heartburn, and gastroesophageal reflux disease (GERD). Contact a nurse or doctor right away if you have:  · Blistering, peeling, red skin rash  · Unusual bleeding, bruising, weakness  · Dark urine or pale stools  · Nausea, vomiting, loss of appetite, stomach pain  · Yellow skin or eyes     Common side effects:  · Constipation, diarrhea  · Headache  © 2017 [facility] [place][note 2] Information is for End User's use only and may not be sold, redistributed or otherwise used for commercial purposes. Follow-up and Dispositions    · Return in about 4 months (around 5/14/2022), or if symptoms worsen or fail to improve, for routine follow up.

## 2022-01-14 NOTE — PATIENT INSTRUCTIONS
Ranitidine (Zantac, Zantac 150, Zantac 300, Zantac 75) - (By mouth)   Why this medicine is used:   Treats ulcers, heartburn, and gastroesophageal reflux disease (GERD). Contact a nurse or doctor right away if you have:  · Blistering, peeling, red skin rash  · Unusual bleeding, bruising, weakness  · Dark urine or pale stools  · Nausea, vomiting, loss of appetite, stomach pain  · Yellow skin or eyes     Common side effects:  · Constipation, diarrhea  · Headache  © 2017 [facility] Information is for End User's use only and may not be sold, redistributed or otherwise used for commercial purposes.

## 2022-01-17 ENCOUNTER — TELEPHONE (OUTPATIENT)
Dept: FAMILY MEDICINE CLINIC | Age: 42
End: 2022-01-17

## 2022-01-17 NOTE — TELEPHONE ENCOUNTER
Per [facility] : No Longer make RANITIDINE  Patient has been using the OTC Zantac which is now FAMOTIDINE 20MG

## 2022-01-19 DIAGNOSIS — K21.00 GASTROESOPHAGEAL REFLUX DISEASE WITH ESOPHAGITIS WITHOUT HEMORRHAGE: Primary | ICD-10-CM

## 2022-01-19 RX ORDER — FAMOTIDINE 40 MG/1
40 TABLET, FILM COATED ORAL DAILY
Qty: 30 TABLET | Refills: 3 | Status: SHIPPED | OUTPATIENT
Start: 2022-01-19

## 2022-01-20 ENCOUNTER — TELEPHONE (OUTPATIENT)
Dept: CARDIAC REHAB | Age: 42
End: 2022-01-20

## 2022-01-26 ENCOUNTER — OFFICE VISIT (OUTPATIENT)
Dept: SLEEP MEDICINE | Age: 42
End: 2022-01-26
Payer: COMMERCIAL

## 2022-01-26 VITALS
HEART RATE: 75 BPM | RESPIRATION RATE: 20 BRPM | BODY MASS INDEX: 38.51 KG/M2 | DIASTOLIC BLOOD PRESSURE: 68 MMHG | TEMPERATURE: 97.7 F | SYSTOLIC BLOOD PRESSURE: 119 MMHG | HEIGHT: 69 IN | WEIGHT: 260 LBS | OXYGEN SATURATION: 96 %

## 2022-01-26 DIAGNOSIS — I10 ESSENTIAL HYPERTENSION: ICD-10-CM

## 2022-01-26 DIAGNOSIS — I25.110 CORONARY ARTERY DISEASE INVOLVING NATIVE CORONARY ARTERY OF NATIVE HEART WITH UNSTABLE ANGINA PECTORIS (HCC): ICD-10-CM

## 2022-01-26 DIAGNOSIS — G47.33 OSA (OBSTRUCTIVE SLEEP APNEA): Primary | ICD-10-CM

## 2022-01-26 DIAGNOSIS — E11.21 TYPE 2 DIABETES WITH NEPHROPATHY (HCC): ICD-10-CM

## 2022-01-26 PROCEDURE — 99214 OFFICE O/P EST MOD 30 MIN: CPT | Performed by: INTERNAL MEDICINE

## 2022-01-26 NOTE — PROGRESS NOTES
[address] [facility]. [place], [address]   Tel.  [phone]   Fax. [address] [facility] [address]   [place], [address] [facility]   Tel.  [phone]   Fax. [phone] [address]   Tel.  [phone]   Fax. [phone] (: 1980) is a 42 y.o. male, established patient, seen for positive airway pressure follow-up and evaluation. He was last seen by Dr. Cristina Moise on 10/2021, prior notes reviewed in detail. Home sleep test 2021 showed AHI of 76.3/hr with a lowest SaO2 of 71%. He is seen today for follow up. ASSESSMENT/PLAN:    ICD-10-CM ICD-9-CM    1. WALLY (obstructive sleep apnea)  G47.33 327.23 AMB SUPPLY ORDER   2. Coronary artery disease involving native coronary artery of native heart with unstable angina pectoris ([facility])  I25.110 414.01      411.1    3. Type 2 diabetes with nephropathy ([facility])  E11.21 250.40      583.81    4. Essential hypertension  I10 401.9    5. BMI 38.0-38.9,adult  Z68.38 V85.38      AHI = 76.3 (). On Bi - Level :  Max IPAP 25 cmH2O; Min EPAP 11 cmH2O; PS 4 cmH2O. Set up 2021. He is adherent with PAP therapy and PAP continues to benefit patient and remains necessary for control of his sleep apnea. 1. Sleep Apnea - Continue on current pressures    Orders Placed This Encounter    AMB SUPPLY ORDER     Diagnosis: (G47.33) WALLY (obstructive sleep apnea)  (primary encounter diagnosis)     Replacement Supplies for Positive Airway Pressure Therapy Device:   Duration of need: 99 months.  Full Face Mask 1 every 3 months.  Full Face Mask Cushion 1 per month.  Headgear 1 every 6 months.  Positive Airway Pressure chinstrap 1 every 6 months.  Tubing with heating element 1 every 3 months.  Filter(s) Disposable 2 per month.  Filter(s) Non-Disposable 1 every 6 months. .   [address] [facility] for Humidifier (Replace) 1 every 6 months.     Jennie Pantoja Kate Gonzalez [facility] NPI: [id number]    Electronically signed. Date:- 01/26/22     * Counseling was provided regarding the importance of regular PAP use with emphasis on ensuring sufficient total sleep time, proper sleep hygiene, and safe driving. * Re-enforced proper and regular cleaning for the device. * He was asked to contact our office for any problems regarding PAP therapy. 2. Coronary artery disease - s/p cardiac stents. Followed by [facility]     3. Type II diabetes -  he continues on his current regimen. I have reviewed the relationship between sleep disordered breathing as it relates to diabetes. 4. Hypertension -  continue on his current regimen, he will continue to monitor his BP and follow up with his PMD for reevaluation/adjustment of medications if warranted. I have reviewed the relationship between hypertension as it relates to sleep-disordered breathing. 5. Recommended a dedicated weight loss program through appropriate diet and exercise regimen as significant weight reduction has been shown to reduce severity of obstructive sleep apnea. SUBJECTIVE/OBJECTIVE:    He  is seen today for follow up on PAP device and reports no problems using the device. The following concerns identified:    Drowsiness no Problems exhaling no   Snoring sometimes Forget to put on no   Mask Comfortable yes Can't fall asleep no   Dry Mouth no Mask falls off no   Air Leaking no Frequent awakenings no     He admits that his sleep has improved on PAP therapy using full face mask and heated tubing. Review of device download indicated:  BiLevel pressure: Max IPAP 25 cmH2O; Min EPAP 11 cmH2O; PS 4 cmH2O;   95th Percentile Leak: 24 L/Min     % Used Days >= 4 hours: 87.  Avg hours used:  6 hours 11 minutes. Therapy Apnea Index averaged over PAP use: 4.8 /hr which reflects significantly improved sleep breathing condition.     [place] Sleepiness Score: 15 and Modified F.O.S.Q. Score Total / 2: 15.5 which reflects improved sleep quality over therapy time. Sleep Review of Systems: notable for Negative difficulty falling asleep; Negative awakenings at night; Negative early morning headaches; Negative memory problems; Negative concentration issues; Negative chest pain; Negative shortness of breath; Negative significant joint pain at night; Negative significant muscle pain at night; Negative rashes or itching; Negative heartburn; Negative significant mood issues; Visit Vitals  /68 (BP 1 Location: Right arm, BP Patient Position: Sitting, BP Cuff Size: Large adult)   Pulse 75   Temp 97.7 °F (36.5 °C) (Temporal)   Resp 20   Ht 5' 9\" (1.753 m)   Wt 260 lb (117.9 kg)   SpO2 96%   BMI 38.40 kg/m²          General:   Alert, oriented, not in acute distress   Eyes:  Anicteric Sclerae; no obvious strabismus   Nose:  No obvious nasal septum deviation    Neck:   Midline trachea   Chest/Lungs:  Symmetrical lung expansion, clear lung fields on auscultation    CVS:  Normal rate, regular rhythm,  no JVD   Extremities:  No obvious rashes, no edema    Neuro:  No focal deficits; No obvious tremor    Psych:  Normal affect,  normal countenance     Patient's phone number [phone] (home)  was reviewed and confirmed for accuracy. He gives permission for messages regarding results and appointments to be left at that number. On this date 01/26/2022 I have spent 30 minutes reviewing previous notes, test results and face to face with the patient discussing the diagnosis and importance of compliance with the treatment plan as well as documenting on the day of the visit. An electronic signature was used to authenticate this note.     -- Rosemary Monreal NP, [facility]  01/26/22

## 2022-01-26 NOTE — PATIENT INSTRUCTIONS
[address] [facility] [place]. [place], [address]  Tel.  [phone]  Fax. 100 [facility] 60  [place], [address] [facility]  Tel.  [phone]  Fax. [phone] [address]  Tel.  [phone]  Fax. [phone]     Learning About CPAP for Sleep Apnea  What is CPAP? CPAP is a small machine that you use at home every night while you sleep. It increases air pressure in your throat to keep your airway open. When you have sleep apnea, this can help you sleep better so you feel much better. CPAP stands for \"continuous positive airway pressure. \"  The CPAP machine will have one of the following:  · A mask that covers your nose and mouth  · Prongs that fit into your nose  · A mask that covers your nose only, the most common type. This type is called NCPAP. The N stands for \"nasal.\"  Why is it done? CPAP is usually the best treatment for obstructive sleep apnea. It is the first treatment choice and the most widely used. Your doctor may suggest CPAP if you have:  · Moderate to severe sleep apnea. · Sleep apnea and coronary artery disease (CAD) or heart failure. How does it help? · CPAP can help you have more normal sleep, so you feel less sleepy and more alert during the daytime. · CPAP may help keep heart failure or other heart problems from getting worse. · NCPAP may help lower your blood pressure. · If you use CPAP, your bed partner may also sleep better because you are not snoring or restless. What are the side effects? Some people who use CPAP have:  · A dry or stuffy nose and a sore throat. · Irritated skin on the face. · Sore eyes. · Bloating. If you have any of these problems, work with your doctor to fix them. Here are some things you can try:  · Be sure the mask or nasal prongs fit well. · See if your doctor can adjust the pressure of your CPAP. · If your nose is dry, try a humidifier.   · If your nose is runny or stuffy, try decongestant medicine or a steroid nasal spray. If these things do not help, you might try a different type of machine. Some machines have air pressure that adjusts on its own. Others have air pressures that are different when you breathe in than when you breathe out. This may reduce discomfort caused by too much pressure in your nose. Where can you learn more? Go to [employer]  Enter Cathyann Felty in the search box to learn more about \"Learning About CPAP for Sleep Apnea. \"   © 8150-8065 Healthwise, Incorporated. Care instructions adapted under license by [facility] [employer] (which disclaims liability or warranty for this information). This care instruction is for use with your licensed healthcare professional. If you have questions about a medical condition or this instruction, always ask your healthcare professional. [address] any warranty or liability for your use of this information. Content Version: 4.8.31540; Last Revised: January 11, 2010  PROPER SLEEP HYGIENE    What to avoid  · Do not have drinks with caffeine, such as coffee or black tea, for 8 hours before bed. · Do not smoke or use other types of tobacco near bedtime. Nicotine is a stimulant and can keep you awake. · Avoid drinking alcohol late in the evening, because it can cause you to wake in the middle of the night. · Do not eat a big meal close to bedtime. If you are hungry, eat a light snack. · Do not drink a lot of water close to bedtime, because the need to urinate may wake you up during the night. · Do not read or watch TV in bed. Use the bed only for sleeping and sexual activity. What to try  · Go to bed at the same time every night, and wake up at the same time every morning. Do not take naps during the day. · Keep your bedroom quiet, dark, and cool. · Get regular exercise, but not within 3 to 4 hours of your bedtime. .  · Sleep on a comfortable pillow and mattress.   · If watching the clock makes you anxious, turn it facing away from you so you cannot see the time. · If you worry when you lie down, start a worry book. Well before bedtime, write down your worries, and then set the book and your concerns aside. · Try meditation or other relaxation techniques before you go to bed. · If you cannot fall asleep, get up and go to another room until you feel sleepy. Do something relaxing. Repeat your bedtime routine before you go to bed again. · Make your house quiet and calm about an hour before bedtime. Turn down the lights, turn off the TV, log off the computer, and turn down the volume on music. This can help you relax after a busy day. Drowsy Driving: The [facility] cites drowsiness as a causing factor in more than 123,444 police reported crashes annually, resulting in 76,000 injuries and 1,500 deaths. Other surveys suggest 55% of people polled have driven while drowsy in the past year, 23% had fallen asleep but not crashed, 3% crashed, and 2% had and accident due to drowsy driving. Who is at risk? Young Drivers: One study of drowsy driving accidents states that 55% of the drivers were under 25 years. Of those, 75% were male. Shift Workers and Travelers: People who work overnight or travel across time zones frequently are at higher risk of experiencing Circadian Rhythm Disorders. They are trying to work and function when their body is programed to sleep. Sleep Deprived: Lack of sleep has a serious impact on your ability to pay attention or focus on a task. Consistently getting less than the average of 8 hours your body needs creates partial or cumulative sleep deprivation. Untreated Sleep Disorders: Sleep Apnea, Narcolepsy, R.L.S., and other sleep disorders (untreated) prevent a person from getting enough restful sleep. This leads to excessive daytime sleepiness and increases the risk for drowsy driving accidents by up to 7 times.   Medications / Alcohol: Even over the counter medications can cause drowsiness. Medications that impair a drivers attention should have a warning label. Alcohol naturally makes you sleepy and on its own can cause accidents. Combined with excessive drowsiness its effects are amplified. Signs of Drowsy Driving:   * You don't remember driving the last few miles   * You may drift out of your sheyla   * You are unable to focus and your thoughts wander   * You may yawn more often than normal   * You have difficulty keeping your eyes open / nodding off   * Missing traffic signs, speeding, or tailgating  Prevention-   Good sleep hygiene, lifestyle and behavioral choices have the most impact on drowsy driving. There is no substitute for sleep and the average person requires 8 hours nightly. If you find yourself driving drowsy, stop and sleep. Consider the sleep hygiene tips provided during your visit as well. Medication Refill Policy: Refills for all medications require 1 week advance notice. Please have your pharmacy fax a refill request. We are unable to fax, or call in \"controled substance\" medications and you will need to pick these prescriptions up from our office. AUTO[employer]CT Activation    Thank you for requesting access to [employer]CT. Please follow the instructions below to securely access and download your online medical record. AUTO[employer]CT allows you to send messages to your doctor, view your test results, renew your prescriptions, schedule appointments, and more. How Do I Sign Up? 1. In your internet browser, go to [email or web address]. 2. Click on the First Time User? Click Here link in the Sign In box. You will see the New Member Sign Up page. 3. Enter your AUTO[employer]CT Access Code exactly as it appears below. You will not need to use this code after youve completed the sign-up process. If you do not sign up before the expiration date, you must request a new code. AUTO[employer]CT Access Code:  Activation code not generated  Current Naytev Status: Active (This is the date your Naytev access code will )    4. Enter the last four digits of your Social Security Number (xxxx) and Date of Birth (mm/dd/yyyy) as indicated and click Submit. You will be taken to the next sign-up page. 5. Create a Savveot ID. This will be your Sav[employer]ot login ID and cannot be changed, so think of one that is secure and easy to remember. 6. Create a Naytev password. You can change your password at any time. 7. Enter your Password Reset Question and Answer. This can be used at a later time if you forget your password. 8. Enter your e-mail address. You will receive e-mail notification when new information is available in [address] 9. Click Sign Up. You can now view and download portions of your medical record. 10. Click the Download Summary menu link to download a portable copy of your medical information. Additional Information    If you have questions, please call [phone]. Remember, Naytev is NOT to be used for urgent needs. For medical emergencies, dial 911.

## 2022-01-28 ENCOUNTER — HOSPITAL ENCOUNTER (EMERGENCY)
Age: 42
Discharge: HOME | End: 2022-01-28
Attending: EMERGENCY MEDICINE
Payer: COMMERCIAL

## 2022-01-28 VITALS
RESPIRATION RATE: 20 BRPM | HEART RATE: 103 BPM | WEIGHT: 259.7 LBS | SYSTOLIC BLOOD PRESSURE: 153 MMHG | HEIGHT: 69 IN | BODY MASS INDEX: 38.47 KG/M2 | DIASTOLIC BLOOD PRESSURE: 69 MMHG | TEMPERATURE: 99.7 F | OXYGEN SATURATION: 100 %

## 2022-01-28 DIAGNOSIS — R51.9 NONINTRACTABLE HEADACHE, UNSPECIFIED CHRONICITY PATTERN, UNSPECIFIED HEADACHE TYPE: ICD-10-CM

## 2022-01-28 DIAGNOSIS — Z20.822 PERSON UNDER INVESTIGATION FOR COVID-19: Primary | ICD-10-CM

## 2022-01-28 LAB
FLUAV AG NPH QL IA: NEGATIVE
FLUBV AG NOSE QL IA: NEGATIVE

## 2022-01-28 PROCEDURE — 99282 EMERGENCY DEPT VISIT SF MDM: CPT

## 2022-01-28 PROCEDURE — 87804 INFLUENZA ASSAY W/OPTIC: CPT

## 2022-01-28 PROCEDURE — U0005 INFEC AGEN DETEC AMPLI PROBE: HCPCS

## 2022-01-28 PROCEDURE — 74011250637 HC RX REV CODE- 250/637: Performed by: EMERGENCY MEDICINE

## 2022-01-28 RX ORDER — FLUTICASONE PROPIONATE 50 MCG
2 SPRAY, SUSPENSION (ML) NASAL DAILY
Status: DISCONTINUED | OUTPATIENT
Start: 2022-01-29 | End: 2022-01-29 | Stop reason: HOSPADM

## 2022-01-28 RX ORDER — BUTALBITAL, ACETAMINOPHEN AND CAFFEINE 50; 325; 40 MG/1; MG/1; MG/1
1 TABLET ORAL
Qty: 10 TABLET | Refills: 0 | Status: SHIPPED | OUTPATIENT
Start: 2022-01-28 | End: 2022-06-13 | Stop reason: SDUPTHER

## 2022-01-28 RX ORDER — IBUPROFEN 600 MG/1
600 TABLET ORAL
Status: COMPLETED | OUTPATIENT
Start: 2022-01-28 | End: 2022-01-28

## 2022-01-28 RX ADMIN — IBUPROFEN 600 MG: 600 TABLET ORAL at 21:55

## 2022-01-29 NOTE — ED PROVIDER NOTES
EMERGENCY DEPARTMENT HISTORY AND PHYSICAL EXAM      Date: 1/28/2022  Patient Name: Leatha Guzman    History of Presenting Illness     Chief Complaint   Patient presents with    Nasal Congestion     Patient arrives with complaint of stuffy nose, headache and fever since this morning when he woke up. Patient also reports body aches as well.  Headache    Fever       History Provided By: Patient    HPI: Leatha Guzman, 42 y.o. male with PMHx as noted below presents the emergency department chief complaint of nasal congestion, moderate, gradual onset dull diffuse headache, body aches, chills, fever. Patient reported onset of symptoms earlier today. Symptoms been constant, moderate in intensity. Patient has not received Covid vaccine. Pt denies any other alleviating or exacerbating factors. Additionally, pt specifically denies any chest pain, severe dyspnea, AMS    PCP: Sapna Cervantes MD    Current Outpatient Medications   Medication Sig Dispense Refill    butalbital-acetaminophen-caffeine (FIORICET, ESGIC) -40 mg per tablet Take 1 Tablet by mouth every six (6) hours as needed for Headache. Indications: a migraine headache 10 Tablet 0    famotidine (PEPCID) 40 mg tablet Take 1 Tablet by mouth daily. 30 Tablet 3    busPIRone (BUSPAR) 30 mg tablet Take 1 Tablet by mouth daily. 90 Tablet 1    insulin glargine (Lantus U-100 Insulin) 100 unit/mL injection INJECT 90 UNITS SUBCUTANEOUSLY IN THE MORNING AND 90 UNITS AT NIGHT 60 mL 5    rosuvastatin (CRESTOR) 20 mg tablet Take 20 mg by mouth nightly.  sertraline (ZOLOFT) 100 mg tablet Take 1 tablet by mouth once daily 30 Tablet 4    clopidogreL (PLAVIX) 75 mg tab Take 1 Tablet by mouth daily. 30 Tablet 12    lisinopriL (PRINIVIL, ZESTRIL) 20 mg tablet Take 1 Tablet by mouth daily. (Patient taking differently: Take 20 mg by mouth daily.  10 in morning) 30 Tablet 12    metFORMIN (GLUCOPHAGE) 500 mg tablet Take 1 Tablet by mouth two (2) times daily (with meals). 180 Tablet 3    insulin regular (HumuLIN R Regular U-100 Insuln) 100 unit/mL injection 30 units with breakfast,30 units with lunch and  30 units with dinner plus correction. 160 units per day max (Patient taking differently: 20 units with breakfast, 20 units with lunch and  20 units with dinner plus correction. 160 units per day max) 50 mL 3    levothyroxine (SYNTHROID) 112 mcg tablet TAKE 1 TABLET BY MOUTH ONCE DAILY BEFORE BREAKFAST 90 Tablet 2    flash glucose sensor (FreeStyle Mikal 2 Sensor) kit Change sensor every 14 days 2 Kit 3    flash glucose scanning reader (FreeStyle Mikal 2 [place]) misc Change sensor every 14 days 1 Each 0    cholecalciferol (Vitamin D3) (5000 Units/125 mcg) tab tablet Take 1 Tab by mouth daily. 90 Tab 1    Insulin Syringe-Needle U-100 (BD Insulin Syringe) 1 mL 25 x 1\" syrg Use for drawing up/injecting testosterone once weekly. 100 Each 3    Syringe with Needle, Disp, 1 mL 25 gauge x 5/8\" syrg Use with testosterone once every week 50 Syringe 3    aluminum & magnesium hydroxide-simethicone (Maalox Maximum Strength) 400-400-40 mg/5 mL suspension Take 10 mL by mouth every six (6) hours as needed for Indigestion.  nitroglycerin (NITROSTAT) 0.4 mg SL tablet Take 1 Tab by mouth every five (5) minutes as needed for Chest Pain. Sit down then put one tab under the tongue every 5 minutes as needed 1 Bottle 0    metoprolol succinate (TOPROL-XL) 25 mg XL tablet Take 1 Tab by mouth daily. (Patient taking differently: Take 25 mg by mouth two (2) times a day.) 90 Tab 3    aspirin delayed-release 81 mg tablet Take 1 Tab by mouth daily.  30 Tab 0       Past History     Past Medical History:  Past Medical History:   Diagnosis Date    Anxiety and depression     Bleeding of eye, left     8/17/21 pt reports receiving injections in right eye for beeding    Chronic headaches 2007    COVID-19 12/20/2020    Diabetes type 1, uncontrolled ([address]     since 8years old    GERD (gastroesophageal reflux disease)     Hypercholesterolemia     Hypertension     Hypothyroidism     MI [facility]) [facility] [address]     as of 21 pt reports 8 stents total    WALLY on CPAP        Past Surgical History:  Past Surgical History:   Procedure Laterality Date    HX CARPAL TUNNEL RELEASE Left     HX CARPAL TUNNEL RELEASE Right     CTE trigger thumb and index finger    HX HEART CATHETERIZATION      HX HEENT      HX LAP CHOLECYSTECTOMY  14    Dr Anish Caban    HX WISDOM TEETH EXTRACTION         Family History:  Family History   Problem Relation Age of Onset    Diabetes Mother     Hypertension Mother     Heart Disease Father     Cancer Father     Diabetes Father     Diabetes Paternal Aunt     Diabetes Maternal Grandmother     Thyroid Cancer Maternal Grandmother     Kidney Disease Maternal Grandmother     Arthritis Maternal Grandmother     Heart Disease Maternal Grandfather     High Cholesterol Maternal Grandfather     Hypertension Maternal Grandfather     Diabetes Paternal Grandmother     Hemophilia Paternal Grandfather        Social History:  Social History     Tobacco Use    Smoking status: Former Smoker     Packs/day: 0.00     Years: 14.00     Pack years: 0.00     Quit date: 2014     Years since quittin.0    Smokeless tobacco: Never Used   Vaping Use    Vaping Use: Never used   Substance Use Topics    Alcohol use: No    Drug use: No       Allergies: Allergies   Allergen Reactions    Morphine Nausea and Vomiting    Penicillin G Swelling    Percocet [Oxycodone-Acetaminophen] Hives and Nausea and Vomiting     Pt is allergic to Oxycodone, has previously tolerated Tylenol and Hydrocodone.  Sulfa (Sulfonamide Antibiotics) Swelling    Tramadol Nausea Only     Nausea and headache           Review of Systems   Review of Systems  Constitutional: positive for fever, chills, and fatigue. HENT: positive for congestion.   Negative sore throat, neck stiffness Eyes: Negative for discharge and redness. Respiratory: Negative for  shortness of breath, wheezing   Cardiovascular: Negative for chest pain, palpitations   Gastrointestinal: Negative for nausea, vomiting, abdominal pain, constipation, diarrhea and blood in stool. Genitourinary: Negative for dysuria, hematuria, flank pain, decreased urine volume, discharge,   Musculoskeletal: positive for myalgias. Negative joint pain . Skin: Negative for rash or lesions . Neurological: Negative weakness, light-headedness, numbness. Positive headaches. Physical Exam   Physical Exam    GENERAL: alert and oriented, no acute distress  EYES: PEERL, No injection, discharge or icterus. ENT: Mucous membranes pink and moist.  NECK: Supple  LUNGS: Airway patent. Non-labored respirations. Breath sounds clear with good air entry bilaterally. HEART: Regular rate and rhythm. No peripheral edema  ABDOMEN: Non-distended and non-tender, without guarding or rebound. SKIN:  warm, dry  MSK/EXTREMITIES: Without swelling, tenderness or deformity, symmetric with normal ROM  NEUROLOGICAL: Alert, oriented      Diagnostic Study Results     Labs -     Recent Results (from the past 12 hour(s))   INFLUENZA A+B VIRAL AGS    Collection Time: 01/28/22  9:58 PM   Result Value Ref Range    Influenza A Antigen Negative NEG      Influenza B Antigen Negative NEG         Radiologic Studies -   No orders to display     CT Results  (Last 48 hours)    None        CXR Results  (Last 48 hours)    None            Medical Decision Making     IJohnnie MD am the first provider for this patient and am the attending of record for this patient encounter. I reviewed the vital signs, available nursing notes, past medical history, past surgical history, family history and social history. Vital Signs-Reviewed the patient's vital signs.   Patient Vitals for the past 12 hrs:   Temp Pulse Resp BP SpO2   01/28/22 2041 99.7 °F (37.6 °C) (!) 103 20 (!) 153/69 100 %       Pulse Oximetry Analysis - 100% on RA      Records Reviewed: Nursing Notes and Old Medical Records    Provider Notes (Medical Decision Making): On presentation, the patient is well appearing, in no acute distress with reassuring vital signs. Based on my history and exam the differential diagnosis for this patient includes COVID 19, flu, URI, sinusitis, bronchitis. Nothing to suggest strep pharyngitis, PNA or bacterial sinusitis. Covid test sent and pending. Flu testing is negative. Patient was given Motrin for his symptoms. Patient may have COVID 19, however is breathing comfortably, maintaining adequate O2 sats on room air is tolerating PO and is overall well appearing so does not require admission at this time. Have recommended self quarantine per CDC guidelines. Symptomatic therapy suggested. Should return immediately to the emergency department develops shortness of breath, confusion, weakness or any othe new/worrisome symptoms       ED Course:   Initial assessment performed. The patients presenting problems have been discussed, and they are in agreement with the care plan formulated and outlined with them. I have encouraged them to ask questions as they arise throughout their visit. Medications   ibuprofen (MOTRIN) tablet 600 mg (600 mg Oral Given 1/28/22 2155)         PROGRESS  Rickie Cervantes's  results have been reviewed with him. He has been counseled regarding his diagnosis. He verbally conveys understanding and agreement of the signs, symptoms, diagnosis, treatment and prognosis and additionally agrees to follow up as recommended. He also agrees with the care-plan and conveys that all of his questions have been answered.   I have also put together some discharge instructions for him that include: 1) educational information regarding their diagnosis, 2) how to care for their diagnosis at home, as well a 3) list of reasons why they would want to return to the ED prior to their follow-up appointment, should their condition change. Disposition:  home    PLAN:  1. Discharge Medication List as of 1/28/2022  9:51 PM      START taking these medications    Details   butalbital-acetaminophen-caffeine (FIORICET, ESGIC) -40 mg per tablet Take 1 Tablet by mouth every six (6) hours as needed for Headache. Indications: a migraine headache, Normal, Disp-10 Tablet, R-0         CONTINUE these medications which have NOT CHANGED    Details   famotidine (PEPCID) 40 mg tablet Take 1 Tablet by mouth daily. , Normal, Disp-30 Tablet, R-3      busPIRone (BUSPAR) 30 mg tablet Take 1 Tablet by mouth daily. , Normal, Disp-90 Tablet, R-1      insulin glargine (Lantus U-100 Insulin) 100 unit/mL injection INJECT 90 UNITS SUBCUTANEOUSLY IN THE MORNING AND 90 UNITS AT NIGHT, Normal, Disp-60 mL, R-5      rosuvastatin (CRESTOR) 20 mg tablet Take 20 mg by mouth nightly., Historical Med      sertraline (ZOLOFT) 100 mg tablet Take 1 tablet by mouth once daily, Normal, Disp-30 Tablet, R-4      clopidogreL (PLAVIX) 75 mg tab Take 1 Tablet by mouth daily. , Normal, Disp-30 Tablet, R-12      lisinopriL (PRINIVIL, ZESTRIL) 20 mg tablet Take 1 Tablet by mouth daily. , Normal, Disp-30 Tablet, R-12      metFORMIN (GLUCOPHAGE) 500 mg tablet Take 1 Tablet by mouth two (2) times daily (with meals). , Normal, Disp-180 Tablet, R-3      insulin regular (HumuLIN R Regular U-100 Insuln) 100 unit/mL injection 30 units with breakfast,30 units with lunch and  30 units with dinner plus correction.  160 units per day max, Normal, Disp-50 mL, R-3      levothyroxine (SYNTHROID) 112 mcg tablet TAKE 1 TABLET BY MOUTH ONCE DAILY BEFORE BREAKFAST, Normal, Disp-90 Tablet, R-2      flash glucose sensor (FreeStyle Mikal 2 Sensor) kit Change sensor every 14 days, Normal, Disp-2 Kit, R-3      flash glucose scanning reader (FreeStyle Mikal 2 [place]) misc Change sensor every 14 days, Normal, Disp-1 Each, R-0      cholecalciferol (Vitamin D3) (5000 Units/125 mcg) tab tablet Take 1 Tab by mouth daily. , Normal, Disp-90 Tab, R-1      Insulin Syringe-Needle U-100 (BD Insulin Syringe) 1 mL 25 x 1\" syrg Use for drawing up/injecting testosterone once weekly. , Normal, Disp-100 Each,R-3      Syringe with Needle, Disp, 1 mL 25 gauge x 5/8\" syrg Use with testosterone once every week, Normal, Disp-50 Syringe,R-3      aluminum & magnesium hydroxide-simethicone (Maalox Maximum Strength) 400-400-40 mg/5 mL suspension Take 10 mL by mouth every six (6) hours as needed for Indigestion. , Historical Med      nitroglycerin (NITROSTAT) 0.4 mg SL tablet Take 1 Tab by mouth every five (5) minutes as needed for Chest Pain. Sit down then put one tab under the tongue every 5 minutes as needed, Normal, Disp-1 Bottle, R-0      metoprolol succinate (TOPROL-XL) 25 mg XL tablet Take 1 Tab by mouth daily. , Print, Disp-90 Tab, R-3      aspirin delayed-release 81 mg tablet Take 1 Tab by mouth daily. , Print, Disp-30 Tab, R-0           2. Follow-up Information     Follow up With Specialties Details Why Contact Info    Willian Mendez MD Internal Medicine Schedule an appointment as soon as possible for a visit in 2 days  [address]. [phone]      [facility] EMERGENCY DEPT Emergency Medicine  If symptoms worsen [address] [place] [address]  [phone]        Return to ED if worse     Diagnosis     Clinical Impression:   1. Person under investigation for COVID-19    2. Nonintractable headache, unspecified chronicity pattern, unspecified headache type        Please note that this dictation was completed with Dragon, computer voice recognition software. Quite often unanticipated grammatical, syntax, homophones, and other interpretive errors are inadvertently transcribed by the computer software. Please disregard these errors. Additionally, please excuse any errors that have escaped final proofreading.

## 2022-01-30 LAB
SARS-COV-2, XPLCVT: DETECTED
SOURCE, COVRS: ABNORMAL

## 2022-01-31 ENCOUNTER — APPOINTMENT (OUTPATIENT)
Dept: CARDIAC REHAB | Age: 42
End: 2022-01-31

## 2022-01-31 ENCOUNTER — PATIENT OUTREACH (OUTPATIENT)
Dept: CASE MANAGEMENT | Age: 42
End: 2022-01-31

## 2022-01-31 NOTE — PROGRESS NOTES
1/31/2022  3:05 PM    Ambulatory Care Coordination ED COVID Follow up Call    Challenges to be reviewed by the provider   Additional needs identified to be addressed with provider no  none    Patient reports that he contacted his Cardiologist today inquire about OTC medications that he can take for symptom relief of nasal congestion - he is waiting to receive a return phone call from the office; history of hypertension and coronary artery disease is noted. Patient reports that he is using Abreva for fever blisters. ACM will route encounter to PCP today for notification/review. Encounter was not routed to provider for escalation. Method of communication with provider :chart routing. Discussed COVID-19 related testing which was available at this time. Test results were positive. Patient informed of results, if available? yes. Current Symptoms: cough, no new symptoms and no worsening symptoms, headaches, nasal congestion, sore throat, fever blisters. Reviewed New or Changed Meds: Yes; Fioricet prescribed at ED discharge. Do you have what you need at home?  Durable Medical Equipment ordered at discharge: None   Home Health/Outpatient orders at discharge: none    Pulse oximeter? no Discussed and confirmed pulse oximeter discharge instructions and when to notify provider or seek emergency care. Patient education provided: Reviewed appropriate site of care based on symptoms and resources available to patient including: When to call 911. Follow up appointment recommended: Yes; PCP (Dr. Gerlene Runner). . If no appointment scheduled, scheduling offered: no.  Future Appointments   Date Time Provider Aileen Smith   2/18/2022 11:10 AM Katheryn Ellison MD [facility] [address] BS AMB   5/2/2022 10:30 AM Ally Martin MD [facility] BS AMB   5/20/2022 10:50 AM Bella Tapia MD [facility] BS AMB   6/13/2022  1:00 PM Myriam Figueroa, NP [facility] BS AMB   2/1/2023 11:00 AM Mary Joshi MD [facility] BS AMB Interventions: Obtained and reviewed discharge summary and/or continuity of care documents, Education of patient/family/caregiver/guardian to support self-management-emergency warning signs and when to seek emergency medical attention, encouraged rest and hydration and patient is advised to contact his PCP regarding scheduling follow-up. Reviewed discharge instructions, medical action plan and red flags with patient who verbalized understanding. Provided contact information for future needs. Plan for follow-up call in 7-10 days based on severity of symptoms and risk factors. Plan for next call: follow-up assessment.      Koffi Schilling RN

## 2022-02-01 ENCOUNTER — HOSPITAL ENCOUNTER (EMERGENCY)
Age: 42
Discharge: HOME | End: 2022-02-01
Attending: EMERGENCY MEDICINE
Payer: COMMERCIAL

## 2022-02-01 VITALS
WEIGHT: 257.72 LBS | DIASTOLIC BLOOD PRESSURE: 83 MMHG | OXYGEN SATURATION: 100 % | SYSTOLIC BLOOD PRESSURE: 169 MMHG | HEART RATE: 79 BPM | TEMPERATURE: 98.1 F | HEIGHT: 69 IN | BODY MASS INDEX: 38.17 KG/M2 | RESPIRATION RATE: 16 BRPM

## 2022-02-01 DIAGNOSIS — U07.1 COVID-19 VIRUS INFECTION: ICD-10-CM

## 2022-02-01 DIAGNOSIS — B00.1 COLD SORE: Primary | ICD-10-CM

## 2022-02-01 DIAGNOSIS — J02.9 ACUTE PHARYNGITIS, UNSPECIFIED ETIOLOGY: ICD-10-CM

## 2022-02-01 PROCEDURE — 87070 CULTURE OTHR SPECIMN AEROBIC: CPT

## 2022-02-01 PROCEDURE — 87147 CULTURE TYPE IMMUNOLOGIC: CPT

## 2022-02-01 PROCEDURE — 99282 EMERGENCY DEPT VISIT SF MDM: CPT

## 2022-02-01 RX ORDER — VALACYCLOVIR HYDROCHLORIDE 1 G/1
1000 TABLET, FILM COATED ORAL 2 TIMES DAILY
Qty: 14 TABLET | Refills: 0 | Status: SHIPPED | OUTPATIENT
Start: 2022-02-01 | End: 2022-02-08

## 2022-02-01 RX ORDER — LIDOCAINE HYDROCHLORIDE 20 MG/ML
15 SOLUTION OROPHARYNGEAL AS NEEDED
Qty: 1 EACH | Refills: 0 | Status: SHIPPED | OUTPATIENT
Start: 2022-02-01 | End: 2022-10-13

## 2022-02-02 ENCOUNTER — PATIENT OUTREACH (OUTPATIENT)
Dept: CASE MANAGEMENT | Age: 42
End: 2022-02-02

## 2022-02-02 NOTE — PROGRESS NOTES
Follow Up Call    Challenges to be reviewed by the provider   Additional needs identified to be addressed with provider: no  none           Encounter was not routed to provider for escalation. Method of communication with provider: none. Contacted the patient by telephone to follow up after second ED visit. .    Status: improved, pt reports the cold sore looks better and his throat doesn't hurt as much  Interventions to address identified needs: n/a    [address] follow up appointment(s):   Future Appointments   Date Time Provider Aileen Smith   2/3/2022 11:10 AM Linwood Underwood MD [facility] BS AMB   2/18/2022 11:10 AM Eris Veras MD [facility] [address] BS AMB   5/2/2022 10:30 AM Alisia Julien MD [facility] BS AMB   5/20/2022 10:50 AM Linwood Underwood MD [facility] BS AMB   6/13/2022  1:00 PM LATASHA Ochoa BS AMB   2/1/2023 11:00 AM Dakota Franco MD [facility] BS [facility] follow up appointment(s): n/a     Follow up appointment completed? No, but pt was instructed to call Dr Caprice Simmonds office for a VV should his condition change. Provided contact information for future needs. Plan for follow-up call in 3-5 days based on severity of symptoms and risk factors.   Plan for next call: f/u on pt's current condition and assess for any needs      Karri Mcfadden RN

## 2022-02-03 ENCOUNTER — VIRTUAL VISIT (OUTPATIENT)
Dept: FAMILY MEDICINE CLINIC | Age: 42
End: 2022-02-03
Payer: COMMERCIAL

## 2022-02-03 ENCOUNTER — APPOINTMENT (OUTPATIENT)
Dept: CARDIAC REHAB | Age: 42
End: 2022-02-03

## 2022-02-03 DIAGNOSIS — J04.0 LARYNGITIS: Primary | ICD-10-CM

## 2022-02-03 DIAGNOSIS — U07.1 LAB TEST POSITIVE FOR DETECTION OF COVID-19 VIRUS: ICD-10-CM

## 2022-02-03 PROBLEM — S05.01XA CORNEAL ABRASION, RIGHT: Status: ACTIVE | Noted: 2022-01-19

## 2022-02-03 PROBLEM — E10.3553 STABLE PROLIFERATIVE DIABETIC RETINOPATHY OF BOTH EYES ASSOCIATED WITH TYPE 1 DIABETES MELLITUS (HCC): Status: ACTIVE | Noted: 2022-01-19

## 2022-02-03 LAB
BACTERIA SPEC CULT: ABNORMAL
BACTERIA SPEC CULT: ABNORMAL
SERVICE CMNT-IMP: ABNORMAL

## 2022-02-03 PROCEDURE — 99214 OFFICE O/P EST MOD 30 MIN: CPT | Performed by: INTERNAL MEDICINE

## 2022-02-03 RX ORDER — DOXYCYCLINE 100 MG/1
100 CAPSULE ORAL 2 TIMES DAILY
Qty: 14 CAPSULE | Refills: 0 | Status: SHIPPED | OUTPATIENT
Start: 2022-02-03 | End: 2022-02-10

## 2022-02-03 NOTE — PROGRESS NOTES
Chief Complaint   Patient presents with    Post-COVID Symptoms    Mouth Lesions     was seen in ER  and given Rx medication      HPI:  Stefan Rushing is a 42 y.o. male who was seen by synchronous (real-time) audio-video technology on 2/3/2022 for Post-COVID Symptoms and Mouth Lesions (was seen in ER  and given Rx medication )    Assessment & Plan:   Diagnoses and all orders for this visit:    1. Laryngitis    2. Lab test positive for detection of COVID-19 virus  -     doxycycline (VIBRAMYCIN) 100 mg capsule; Take 1 Capsule by mouth two (2) times a day for 7 days. I spent at least 20 minutes on this visit with this established patient. 712  Subjective:   Stefan Rushing is a 42 y.o. male with diabetes, hypertension, hypercholesterolemia, coronary artery disease is seen for ER follow up. Patient was seen in ER 2/01/22 following diagnoses with COVID with c/o sore throat. He was diagnosed with cold sore and acute pharyngitis. He was treated with valtrex and mouth wash. Prior to Admission medications    Medication Sig Start Date End Date Taking? Authorizing Provider   valACYclovir (VALTREX) 1 gram tablet Take 1 Tablet by mouth two (2) times a day for 7 days. 2/1/22 2/8/22 Yes Duarte Vivar PA   lidocaine (XYLOCAINE) 2 % solution Take 15 mL by mouth as needed for Pain. Gargle and spit out to help with pain 2/1/22  Yes Duarte Vivar PA   butalbital-acetaminophen-caffeine (FIORICET, ESGIC) -40 mg per tablet Take 1 Tablet by mouth every six (6) hours as needed for Headache. Indications: a migraine headache 1/28/22  Yes Tanmay Khanna MD   famotidine (PEPCID) 40 mg tablet Take 1 Tablet by mouth daily. 1/19/22  Yes Alden Pierre MD   busPIRone (BUSPAR) 30 mg tablet Take 1 Tablet by mouth daily.  1/14/22  Yes Alden Pierre MD   insulin glargine (Lantus U-100 Insulin) 100 unit/mL injection INJECT 90 UNITS SUBCUTANEOUSLY IN THE MORNING AND 90 UNITS AT NIGHT 12/20/21  Yes Annabel Bazan MD   rosuvastatin (CRESTOR) 20 mg tablet Take 20 mg by mouth nightly. Yes Provider, Historical   sertraline (ZOLOFT) 100 mg tablet Take 1 tablet by mouth once daily 11/8/21  Yes Ilir Rocha MD   clopidogreL (PLAVIX) 75 mg tab Take 1 Tablet by mouth daily. 10/29/21  Yes Darryle Donna, MD   lisinopriL (PRINIVIL, ZESTRIL) 20 mg tablet Take 1 Tablet by mouth daily. Patient taking differently: Take 20 mg by mouth daily. 10 in morning 10/29/21  Yes Darryle Donna, MD   metFORMIN (GLUCOPHAGE) 500 mg tablet Take 1 Tablet by mouth two (2) times daily (with meals). 10/1/21  Yes Maeve Leavitt MD   insulin regular (HumuLIN R Regular U-100 Insuln) 100 unit/mL injection 30 units with breakfast,30 units with lunch and  30 units with dinner plus correction. 160 units per day max  Patient taking differently: 20 units with breakfast, 20 units with lunch and  20 units with dinner plus correction. 160 units per day max 9/29/21  Yes Maeve Leavitt MD   flash glucose sensor (FreeStyle [place] 2 Sensor) kit Change sensor every 14 days 3/26/21  Yes Maeve Leavitt MD   flash glucose scanning reader [facility] 2 [place]) misc Change sensor every 14 days 3/26/21  Yes Maeve Leavitt MD   cholecalciferol (Vitamin D3) (5000 Units/125 mcg) tab tablet Take 1 Tab by mouth daily. 3/10/21  Yes Ilir Rocha MD   Insulin Syringe-Needle U-100 (BD Insulin Syringe) 1 mL 25 x 1\" syrg Use for drawing up/injecting testosterone once weekly. 12/7/20  Yes Maeve Leavitt MD   Syringe with Needle, Disp, 1 mL 25 gauge x 5/8\" syrg Use with testosterone once every week 12/4/20  Yes Maeve Leavitt MD   aluminum & magnesium hydroxide-simethicone (Maalox Maximum Strength) 400-400-40 mg/5 mL suspension Take 10 mL by mouth every six (6) hours as needed for Indigestion. Yes Provider, Historical   nitroglycerin (NITROSTAT) 0.4 mg SL tablet Take 1 Tab by mouth every five (5) minutes as needed for Chest Pain.  Sit down then put one tab under the tongue every 5 minutes as needed 12/4/19  Yes Shania Soares MD   metoprolol succinate (TOPROL-XL) 25 mg XL tablet Take 1 Tab by mouth daily. Patient taking differently: Take 25 mg by mouth two (2) times a day. 12/3/19  Yes Kobi Espinal MD   aspirin delayed-release 81 mg tablet Take 1 Tab by mouth daily. 9/24/19  Yes Regine Verduzco MD   levothyroxine (SYNTHROID) 112 mcg tablet TAKE 1 TABLET BY MOUTH ONCE DAILY BEFORE BREAKFAST  Patient not taking: Reported on 2/3/2022 7/27/21 2/3/22  Constantine Brock MD     Patient Active Problem List   Diagnosis Code    DM (diabetes mellitus) ([facility] [address] E11.9    Acquired hypothyroidism E03.9    Essential hypertension I10    Fibromyalgia M79.7    Microalbuminuria R80.9    Anxiety F41.9    Migraine without aura G43.009    Hypertriglyceridemia E78.1    Severe obesity ([facility] [address] E66.01    Transient ischemic attack involving left internal carotid artery G45.1    Chest pain R07.9    Unstable angina ([facility] [address] I20.0    Coronary artery disease involving native coronary artery of native heart with unstable angina pectoris (HCC) I25.110    Type 2 diabetes with nephropathy ([facility]) E11.21    Dysthymia F34.1    Adjustment disorder with mixed emotional features F43.29    Anxiety disorder due to general medical condition with panic attack F06.4, F41.0    Insomnia disorder with non-sleep disorder mental comorbidity G47.00    Dizzy spells R42    Multiple lacunar infarcts (HCC) I63.81    Cervical spondylosis with radiculopathy M47.22    Low back pain M54.50     Current Outpatient Medications   Medication Sig Dispense Refill    valACYclovir (VALTREX) 1 gram tablet Take 1 Tablet by mouth two (2) times a day for 7 days. 14 Tablet 0    lidocaine (XYLOCAINE) 2 % solution Take 15 mL by mouth as needed for Pain.  Gargle and spit out to help with pain 1 Each 0    butalbital-acetaminophen-caffeine (FIORICET, ESGIC) -40 mg per tablet Take 1 Tablet by mouth every six (6) hours as needed for Headache. Indications: a migraine headache 10 Tablet 0    famotidine (PEPCID) 40 mg tablet Take 1 Tablet by mouth daily. 30 Tablet 3    busPIRone (BUSPAR) 30 mg tablet Take 1 Tablet by mouth daily. 90 Tablet 1    insulin glargine (Lantus U-100 Insulin) 100 unit/mL injection INJECT 90 UNITS SUBCUTANEOUSLY IN THE MORNING AND 90 UNITS AT NIGHT 60 mL 5    rosuvastatin (CRESTOR) 20 mg tablet Take 20 mg by mouth nightly.  sertraline (ZOLOFT) 100 mg tablet Take 1 tablet by mouth once daily 30 Tablet 4    clopidogreL (PLAVIX) 75 mg tab Take 1 Tablet by mouth daily. 30 Tablet 12    lisinopriL (PRINIVIL, ZESTRIL) 20 mg tablet Take 1 Tablet by mouth daily. (Patient taking differently: Take 20 mg by mouth daily. 10 in morning) 30 Tablet 12    metFORMIN (GLUCOPHAGE) 500 mg tablet Take 1 Tablet by mouth two (2) times daily (with meals). 180 Tablet 3    insulin regular (HumuLIN R Regular U-100 Insuln) 100 unit/mL injection 30 units with breakfast,30 units with lunch and  30 units with dinner plus correction. 160 units per day max (Patient taking differently: 20 units with breakfast, 20 units with lunch and  20 units with dinner plus correction. 160 units per day max) 50 mL 3    flash glucose sensor (FreeStyle Mikal 2 Sensor) kit Change sensor every 14 days 2 Kit 3    flash glucose scanning reader (FreeStyle Mikal 2 [place]) misc Change sensor every 14 days 1 Each 0    cholecalciferol (Vitamin D3) (5000 Units/125 mcg) tab tablet Take 1 Tab by mouth daily. 90 Tab 1    Insulin Syringe-Needle U-100 (BD Insulin Syringe) 1 mL 25 x 1\" syrg Use for drawing up/injecting testosterone once weekly. 100 Each 3    Syringe with Needle, Disp, 1 mL 25 gauge x 5/8\" syrg Use with testosterone once every week 50 Syringe 3    aluminum & magnesium hydroxide-simethicone (Maalox Maximum Strength) 400-400-40 mg/5 mL suspension Take 10 mL by mouth every six (6) hours as needed for Indigestion.       nitroglycerin (NITROSTAT) 0.4 mg SL tablet Take 1 Tab by mouth every five (5) minutes as needed for Chest Pain. Sit down then put one tab under the tongue every 5 minutes as needed 1 Bottle 0    metoprolol succinate (TOPROL-XL) 25 mg XL tablet Take 1 Tab by mouth daily. (Patient taking differently: Take 25 mg by mouth two (2) times a day.) 90 Tab 3    aspirin delayed-release 81 mg tablet Take 1 Tab by mouth daily. 30 Tab 0     Allergies   Allergen Reactions    Morphine Nausea and Vomiting    Penicillin G Swelling    Percocet [Oxycodone-Acetaminophen] Hives and Nausea and Vomiting     Pt is allergic to Oxycodone, has previously tolerated Tylenol and Hydrocodone.     Sulfa (Sulfonamide Antibiotics) Swelling    Tramadol Nausea Only     Nausea and headache       Past Medical History:   Diagnosis Date    Anxiety and depression     Bleeding of eye, left     8/17/21 pt reports receiving injections in right eye for beeding    Chronic headaches 2007    COVID-19 12/20/2020    Diabetes type 1, uncontrolled ([facility] [address]     since 8years old    GERD (gastroesophageal reflux disease)     Hypercholesterolemia     Hypertension     Hypothyroidism     MI (myocardial infarction) ([address]     as of 8/17/21 pt reports 8 stents total    WALLY on CPAP      Past Surgical History:   Procedure Laterality Date    HX CARPAL TUNNEL RELEASE Left 2012    HX CARPAL TUNNEL RELEASE Right 2018    CTE trigger thumb and index finger    HX HEART CATHETERIZATION      HX HEENT      HX LAP CHOLECYSTECTOMY  8/26/14    Dr Sylvester Kothari    HX WISDOM TEETH EXTRACTION       Family History   Problem Relation Age of Onset    Diabetes Mother     Hypertension Mother     Heart Disease Father     Cancer Father     Diabetes Father     Diabetes Paternal Aunt     Diabetes Maternal Grandmother     Thyroid Cancer Maternal Grandmother     Kidney Disease Maternal Grandmother     Arthritis Maternal Grandmother     Heart Disease Maternal Grandfather     High Cholesterol Maternal Grandfather     Hypertension Maternal Grandfather     Diabetes Paternal Grandmother     Hemophilia Paternal Grandfather      Social History     Tobacco Use    Smoking status: Former Smoker     Packs/day: 0.00     Years: 14.00     Pack years: 0.00     Quit date: 2014     Years since quittin.0    Smokeless tobacco: Never Used   Substance Use Topics    Alcohol use: No     ROS:  As per hpi    Objective:     Patient-Reported Vitals 2/3/2022   Patient-Reported Weight 254   Patient-Reported Height 5'9\"   Patient-Reported Temperature 98.3      General: alert, cooperative, no distress   Mental  status: normal mood, behavior, speech, dress, motor activity, and thought processes, able to follow commands   HENT: NCAT   Neck: no visualized mass   Resp: no respiratory distress   Neuro: no gross deficits   Skin: no discoloration or lesions of concern on visible areas   Psychiatric: normal affect, consistent with stated mood, no evidence of hallucinations     Additional exam findings: We discussed the expected course, resolution and complications of the diagnosis(es) in detail. Medication risks, benefits, costs, interactions, and alternatives were discussed as indicated. I advised him to contact the office if his condition worsens, changes or fails to improve as anticipated. He expressed understanding with the diagnosis(es) and plan. Partha Duran, was evaluated through a synchronous (real-time) audio-video encounter. The patient (or guardian if applicable) is aware that this is a billable service, which includes applicable co-pays. Verbal consent to proceed has been obtained. The visit was conducted pursuant to the emergency declaration under the [address] [place] [address] authority and the [employer] General Act. Patient identification was verified, and a caregiver was present when appropriate.  The patient was located at home in a state where the provider was licensed to provide care.     David Katz MD

## 2022-02-04 NOTE — PROGRESS NOTES
RE STREP: Called patient who verified demographics. Informed of positive throat culture. He notes that he followed up with his PCP yesterday and was already placed on an antibiotic. He believes this was doxycycline. No additional changed needed.

## 2022-02-07 ENCOUNTER — APPOINTMENT (OUTPATIENT)
Dept: CARDIAC REHAB | Age: 42
End: 2022-02-07

## 2022-02-08 ENCOUNTER — PATIENT OUTREACH (OUTPATIENT)
Dept: CASE MANAGEMENT | Age: 42
End: 2022-02-08

## 2022-02-08 NOTE — PROGRESS NOTES
2/8/2022  11:36 AM    Chart review by this ACM today to perform follow-up assessment. Per chart review, patient is currently being followed by EMPERATRIZ Villagran RN for Care Transitions s/p subsequent ED visit to [facility] ED on 2/1/22. No further patient outreach/follow-up by this ACM is scheduled at this time.

## 2022-02-10 ENCOUNTER — APPOINTMENT (OUTPATIENT)
Dept: CARDIAC REHAB | Age: 42
End: 2022-02-10

## 2022-02-14 ENCOUNTER — APPOINTMENT (OUTPATIENT)
Dept: CARDIAC REHAB | Age: 42
End: 2022-02-14

## 2022-02-17 ENCOUNTER — APPOINTMENT (OUTPATIENT)
Dept: CARDIAC REHAB | Age: 42
End: 2022-02-17

## 2022-02-18 ENCOUNTER — OFFICE VISIT (OUTPATIENT)
Dept: ENDOCRINOLOGY | Age: 42
End: 2022-02-18
Payer: COMMERCIAL

## 2022-02-18 VITALS
SYSTOLIC BLOOD PRESSURE: 125 MMHG | DIASTOLIC BLOOD PRESSURE: 70 MMHG | WEIGHT: 263.2 LBS | BODY MASS INDEX: 38.98 KG/M2 | HEIGHT: 69 IN | HEART RATE: 70 BPM

## 2022-02-18 DIAGNOSIS — E78.2 MIXED HYPERLIPIDEMIA: ICD-10-CM

## 2022-02-18 DIAGNOSIS — E29.1 HYPOGONADISM IN MALE: ICD-10-CM

## 2022-02-18 DIAGNOSIS — E03.9 ACQUIRED HYPOTHYROIDISM: ICD-10-CM

## 2022-02-18 DIAGNOSIS — E10.42 TYPE 1 DIABETES MELLITUS WITH DIABETIC POLYNEUROPATHY (HCC): Primary | ICD-10-CM

## 2022-02-18 PROCEDURE — 99215 OFFICE O/P EST HI 40 MIN: CPT | Performed by: INTERNAL MEDICINE

## 2022-02-18 RX ORDER — TESTOSTERONE 20.25 MG/1.25G
GEL TOPICAL
Qty: 1 EACH | Refills: 3 | Status: SHIPPED | OUTPATIENT
Start: 2022-02-18 | End: 2022-06-29 | Stop reason: SDUPTHER

## 2022-02-18 RX ORDER — ROSUVASTATIN CALCIUM 40 MG/1
40 TABLET, COATED ORAL DAILY
COMMUNITY
Start: 2022-02-16

## 2022-02-18 RX ORDER — LEVOTHYROXINE SODIUM 112 UG/1
TABLET ORAL
COMMUNITY
Start: 2022-02-15 | End: 2022-05-23

## 2022-02-18 NOTE — PROGRESS NOTES
Chief Complaint   Patient presents with    Diabetes     pcp and pharmacy verified     History of Present Illness: Carmelina Carpenter is a 36 y.o. male here for follow up of diabetes. 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 Patsy Martinez of Cardiology. He was also in the ED in January 2020 with issues of N/V. He was tested for influenza and it was negative. For his issues of vertigo, and dizziness he is followed by Dr. Dm Villavicencio of Neurology. At our visit in April 2020 I increased his insulin regimen, he was no longer having issues of hypoglycemia, but he was having frequent hyperglycemia. Pt instructed to continue the NPH 70 units in the AM and 70 units HS and to increase his Novolin R to 30 units with breakfast, 30 units with lunch and 30 units with dinner, plus 2:50 correction scale. Pt is still following with Dr. James Wilson of psychiatry for anxiety and depression. He was working with a dietician and has been using a [employer] CGM. His A1C in January 2022 was down from 9.9% to 9.1%. \"A whole bunch of stuff has been going on. I have tried changing my diet to work on weight loss. I have had an ECHO recently, It showed my heart was good, though one of my valves was a little small, but nothing major. I saw a lung doctor and they did breathing tests on my lungs and they said everything was good. \"    Pt notes that the FreeStyle brendon sensor has been off between 10-50 points. He has not been on any steroids since our last visit, \"but I have been on ABx, I ended up with COVID and strep throat 3 weeks ago and that has caused me to have his blood sugars. For the last month my sugars have been all over the place. \"  \"It started at an ear ache, then I got fever blisters and then strep throat\". Pt notes his vision is doing well and he is recovering from his retinopathy surgery.  He is still having difficulty with seeing \"up close\". Pt notes he has been taking Regular 10-30 based on his carb intake and BG readings. He notes he feels very uncomfortable when his BGs get in the 100s range. Pt is taking Lantus 90 units in the AM and 90 units HS. He is still taking the Metformin 500mg BID. He is testing his BGs 4-6 times per day using the [employer]. His BGs have been in the 100-400 range. His FBGs tend to be his lowest and then they run higher during the day. Pt is eating 2-3 meals per day  He has breakfast, around 730-830AM, yesterday he had sausage gravy, a biscuit, has browns and diet soda. today he did not eat breakfast.   He has lunch 3 days per week. He will eat around Noon-1PM, he did not eat lunch yesterday. He notes that he has not been snacking during the day. He has dinner around 7-9PM, last night he had a ham steak and diet soda. He will snack in the evening. He notes he has been snacking on chips or a sandwich. He notes he is snacking at night, because he is hungry. He goes to bed around 11PM-2AM.    Pt has hx of CAD s/p stenting in December 2019. Pt is taking Rosuvastatin 40mg daily. Pt has hx of neuropathy. No hx of nephropathy. Pt has hx of hypothyroidism, since the age of 11-9 years old. He is currently taking 112mcg daily. At our visit in October 2019 we tested his Thyroid labs, which were normal, but he had a very low Testosterone of 199 on 10/22/19 and repeat was 240 in 10/28/19. His FSH, LH and prolactin were unremarkable. Pt opted to  Not start Clomid 50mg every other day. At our last visit we again ordered the clomid. Pt notes that he has been taking the Clomid 50mg every other day. He notes he is still having issues of ED and poor libido so he stopped taking the Clomid because of the cost.  He wanted to discuss trying an alternative agent. He is not currently taking the testosterone he noted some change in the size of his testicles and he stopped.  He did not note any improvement symptomatically so he stopped. Pt notes he was changed from CPAP to BiPap for the last 2 months. Current Outpatient Medications   Medication Sig    lidocaine (XYLOCAINE) 2 % solution Take 15 mL by mouth as needed for Pain. Gargle and spit out to help with pain    butalbital-acetaminophen-caffeine (FIORICET, ESGIC) -40 mg per tablet Take 1 Tablet by mouth every six (6) hours as needed for Headache. Indications: a migraine headache    famotidine (PEPCID) 40 mg tablet Take 1 Tablet by mouth daily.  busPIRone (BUSPAR) 30 mg tablet Take 1 Tablet by mouth daily.  insulin glargine (Lantus U-100 Insulin) 100 unit/mL injection INJECT 90 UNITS SUBCUTANEOUSLY IN THE MORNING AND 90 UNITS AT NIGHT    rosuvastatin (CRESTOR) 20 mg tablet Take 20 mg by mouth nightly.  sertraline (ZOLOFT) 100 mg tablet Take 1 tablet by mouth once daily    clopidogreL (PLAVIX) 75 mg tab Take 1 Tablet by mouth daily.  lisinopriL (PRINIVIL, ZESTRIL) 20 mg tablet Take 1 Tablet by mouth daily. (Patient taking differently: Take 20 mg by mouth daily. 10 in morning)    metFORMIN (GLUCOPHAGE) 500 mg tablet Take 1 Tablet by mouth two (2) times daily (with meals).  insulin regular (HumuLIN R Regular U-100 Insuln) 100 unit/mL injection 30 units with breakfast,30 units with lunch and  30 units with dinner plus correction. 160 units per day max (Patient taking differently: 20 units with breakfast, 20 units with lunch and  20 units with dinner plus correction. 160 units per day max)    flash glucose sensor (FreeStyle Mikal 2 Sensor) kit Change sensor every 14 days    flash glucose scanning reader (FreeStyle Mikal 2 [place]) misc Change sensor every 14 days    cholecalciferol (Vitamin D3) (5000 Units/125 mcg) tab tablet Take 1 Tab by mouth daily.  Insulin Syringe-Needle U-100 (BD Insulin Syringe) 1 mL 25 x 1\" syrg Use for drawing up/injecting testosterone once weekly.     Syringe with Needle, Disp, 1 mL 25 gauge x 5/8\" syrg Use with testosterone once every week    aluminum & magnesium hydroxide-simethicone (Maalox Maximum Strength) 400-400-40 mg/5 mL suspension Take 10 mL by mouth every six (6) hours as needed for Indigestion.  nitroglycerin (NITROSTAT) 0.4 mg SL tablet Take 1 Tab by mouth every five (5) minutes as needed for Chest Pain. Sit down then put one tab under the tongue every 5 minutes as needed    metoprolol succinate (TOPROL-XL) 25 mg XL tablet Take 1 Tab by mouth daily. (Patient taking differently: Take 25 mg by mouth two (2) times a day.)    aspirin delayed-release 81 mg tablet Take 1 Tab by mouth daily. No current facility-administered medications for this visit. Allergies   Allergen Reactions    Morphine Nausea and Vomiting    Penicillin G Swelling    Percocet [Oxycodone-Acetaminophen] Hives and Nausea and Vomiting     Pt is allergic to Oxycodone, has previously tolerated Tylenol and Hydrocodone.  Sulfa (Sulfonamide Antibiotics) Swelling    Tramadol Nausea Only     Nausea and headache       Review of Systems:  - Eyes: no blurry vision or double vision  - Cardiovascular: no chest pain  - Respiratory: no shortness of breath  - Musculoskeletal: no myalgias  - Neurological: no numbness/tingling in extremities    Physical Examination:  There were no vitals taken for this visit.   - General: pleasant, no distress, good eye contact   - Neck: no carotid bruits  - Cardiovascular: regular, normal rate, nl s1 and s2, no m/r/g, 2+ DP pulses   - Respiratory: clear bilaterally  - Integumentary: no edema, no foot ulcers,   - Psychiatric: normal mood and affect    Diabetic foot exam:     Left Foot:   Visual Exam: callous - present   Pulse DP: 2+ (normal)   Filament test: normal sensation    Vibratory sensation: normal      Right Foot:   Visual Exam: callous - present   Pulse DP: 2+ (normal)   Filament test: normal sensation    Vibratory sensation: normal        Data Reviewed:   Component Latest Ref Rng & Units 1/14/2022          11:36 AM   Hemoglobin A1c (POC)      % 9.1       Assessment/Plan:   1) DM > His A1C in January 2022 was down from 9.9% to 9.1%. Since his BGs are ok in the morning and much higher during the day, I instructed pt to increase his Regular insulin to 15 units with small meals and 20 units with his larger meals, plus 3:50 for BG >150 and to cut down on the snacking. If his BGs are not improving on this change we can discuss trying him on phentermine to help with appetite suppression to help with the snacking. Pt to check his BGs 4 times per day and mail his BG logs to me in 4 weeks. 2) Hypothyroidism > Pt is clinically euthyroid on LT4 112mcg daily. His TFTs were at goal in October 2021. 3) Hypogonadism > He was not benefiting from the Testosterone so we agreed to try him on a topical testosterone. I spent 40 minutes on his case and > 50% of the time was spent in counseling on diabetes management and determining what changes to make to his insulin regimen. Pt voices understanding and agreement with the plan.   Pain noted and pt was recommended to call his PCP for further evaluation and treatment, as needed    RTC 4 months    Copy sent to:  Dr. Yani Louis

## 2022-02-18 NOTE — LETTER
2/18/2022 11:47 AM    Mr. Juana Acosta  [facility] [address]. [id number]      To Whom It May Concern:    Juana Acosta is currently under the care of [facility]. Because of the complications he is experiencing due to his diabetes and sequela, he is currently on disability and is not able to work. If there are questions or concerns please have the patient contact our office.         Sincerely,      Gilda Urrutia MD

## 2022-02-18 NOTE — LETTER
2/18/2022    Patient: Jeffery Perdomo   YOB: 1980   Date of Visit: 2/18/2022     Lyubov Degree, MD  [address] [facility]  [address] [place] [address]  [address] [facility] [address]    Dear Lyubov Degree, MD,      Thank you for referring Mr. Néstor Adams to [address] [place] [address] for evaluation. My notes for this consultation are attached. If you have questions, please do not hesitate to call me. I look forward to following your patient along with you.       Sincerely,    Agustina Lange MD

## 2022-02-18 NOTE — PATIENT INSTRUCTIONS
1) Continue the Lantus 90 units in the morning and 90 units at bedtime. 2) R insulin: Try taking 15 units of insulin for a small meal and 20 units for a larger meal plus correction    150-199 3 additional units  200-249 6 additional units  250-299 9 additional units  300-349 12 additional units  350-399 15 additional units  400-449 18 additional units  450-499 21 additional units  500-549 24 additional units  550+ 27 additional units             Plantar Fasciitis: Care Instructions  Overview     Plantar fasciitis is pain and inflammation of the plantar fascia, the tissue at the bottom of your foot that connects the heel bone to the toes. The plantar fascia also supports the arch. If you strain the plantar fascia, it can develop small tears and cause heel pain when you stand or walk. Plantar fasciitis can be caused by running or other sports. It also may occur in people who are overweight or who have high arches or flat feet. You may get plantar fasciitis if you walk or stand for long periods, or have a tight Achilles tendon or calf muscles. You can improve your foot pain with rest and other care at home. It might take a few weeks to a few months for your foot to heal completely. Follow-up care is a key part of your treatment and safety. Be sure to make and go to all appointments, and call your doctor if you are having problems. It's also a good idea to know your test results and keep a list of the medicines you take. How can you care for yourself at home? · Rest your feet often. Reduce your activity to a level that lets you avoid pain. If possible, do not run or walk on hard surfaces. · Take pain medicines exactly as directed. ? If the doctor gave you a prescription medicine for pain, take it as prescribed. ? If you are not taking a prescription pain medicine, take an over-the-counter anti-inflammatory medicine for pain and swelling, such as ibuprofen (Advil, Motrin) or naproxen (Aleve).  Read and follow all instructions on the label. · Use ice massage to help with pain and swelling. You can use an ice cube or an ice cup several times a day. To make an ice cup, fill a paper cup with water and freeze it. Cut off the top of the cup until a half-inch of ice shows. Hold onto the remaining paper to use the cup. Rub the ice in small circles over the area for 5 to 7 minutes. · Contrast baths, which alternate hot and cold water, can also help reduce swelling. But because heat alone may make pain and swelling worse, end a contrast bath with a soak in cold water. · Wear a night splint if your doctor suggests it. A night splint holds your foot with the toes pointed up and the foot and ankle at a 90-degree angle. This position gives the bottom of your foot a constant, gentle stretch. · Do simple exercises such as calf stretches and towel stretches 2 to 3 times each day, especially when you first get up in the morning. These can help the plantar fascia become more flexible. They also make the muscles that support your arch stronger. Hold these stretches for 15 to 30 seconds per stretch. Repeat 2 to 4 times. ? Stand about 1 foot from a wall. Place the palms of both hands against the wall at chest level. Lean forward against the wall, keeping one leg with the knee straight and heel on the ground while bending the knee of the other leg.  ? Sit down on the floor or a mat with your feet stretched in front of you. Roll up a towel lengthwise, and loop it over the ball of your foot. Holding the towel at both ends, gently pull the towel toward you to stretch your foot. · Wear shoes with good arch support. Athletic shoes or shoes with a well-cushioned sole are good choices. · Replace athletic shoes regularly. · Try heel cups or shoe inserts (orthotics) to help cushion your heel. You can buy these at many shoe stores. · Put on your shoes as soon as you get out of bed.  Going barefoot or wearing slippers may make your pain worse.  · Reach and stay at a good weight for your height. This puts less strain on your feet. When should you call for help? Call your doctor now or seek immediate medical care if:    · You have heel pain with fever, redness, or warmth in your heel.     · You cannot put weight on the sore foot. Watch closely for changes in your health, and be sure to contact your doctor if:    · You have numbness or tingling in your heel.     · Your heel pain lasts more than 2 weeks. Where can you learn more? Go to http://www.gray.com/  Enter X351 in the search box to learn more about \"Plantar Fasciitis: Care Instructions. \"  Current as of: July 1, 2021               Content Version: 13.0  © 2006-2021 [employer]. Care instructions adapted under license by [employer] (which disclaims liability or warranty for this information). If you have questions about a medical condition or this instruction, always ask your healthcare professional. [place] [address] any warranty or liability for your use of this information. Plantar Fasciitis: Exercises  Introduction  Here are some examples of exercises for you to try. The exercises may be suggested for a condition or for rehabilitation. Start each exercise slowly. Ease off the exercises if you start to have pain. You will be told when to start these exercises and which ones will work best for you. How to do the exercises  Towel stretch    1. Sit with your legs extended and knees straight. 2. Place a towel around your foot just under the toes. 3. Hold each end of the towel in each hand, with your hands above your knees. 4. Pull back with the towel so that your foot stretches toward you. 5. Hold the position for at least 15 to 30 seconds. 6. Repeat 2 to 4 times a session, up to 5 sessions a day.   Calf stretch    This exercise stretches the muscles at the back of the lower leg (the calf) and the Achilles tendon. Do this exercise 3 or 4 times a day, 5 days a week. 1. Stand facing a wall with your hands on the wall at about eye level. Put the leg you want to stretch about a step behind your other leg. 2. Keeping your back heel on the floor, bend your front knee until you feel a stretch in the back leg. 3. Hold the stretch for 15 to 30 seconds. Repeat 2 to 4 times. Plantar fascia and calf stretch    Stretching the plantar fascia and calf muscles can increase flexibility and decrease heel pain. You can do this exercise several times each day and before and after activity. 1. Stand on a step as shown above. Be sure to hold on to the banister. 2. Slowly let your heels down over the edge of the step as you relax your calf muscles. You should feel a gentle stretch across the bottom of your foot and up the back of your leg to your knee. 3. Hold the stretch about 15 to 30 seconds, and then tighten your calf muscle a little to bring your heel back up to the level of the step. Repeat 2 to 4 times. Towel curls    Make this exercise more challenging by placing a weighted object, such as a soup can, on the other end of the towel. 1. While sitting, place your foot on a towel on the floor and scrunch the towel toward you with your toes. 2. Then, also using your toes, push the towel away from you. [place]mar pickups    1. Put marbles on the floor next to a cup.  2. Using your toes, try to lift the marbles up from the floor and put them in the cup. Follow-up care is a key part of your treatment and safety. Be sure to make and go to all appointments, and call your doctor if you are having problems. It's also a good idea to know your test results and keep a list of the medicines you take. Where can you learn more? Go to http://www.gray.com/  Enter [id number] in the search box to learn more about \"Plantar Fasciitis: Exercises. \"  Current as of: July 1, 2021               Content Version: 13.0  © 9381-0586 [employer]. Care instructions adapted under license by [employer] (which disclaims liability or warranty for this information). If you have questions about a medical condition or this instruction, always ask your healthcare professional. [address] any warranty or liability for your use of this information.

## 2022-02-18 NOTE — Clinical Note
2/18/2022 11:47 AM    Mr. Perrin [facility] [address]. [id number]              Sincerely,      Isis Shaikh MD

## 2022-02-21 ENCOUNTER — APPOINTMENT (OUTPATIENT)
Dept: CARDIAC REHAB | Age: 42
End: 2022-02-21

## 2022-02-24 ENCOUNTER — APPOINTMENT (OUTPATIENT)
Dept: CARDIAC REHAB | Age: 42
End: 2022-02-24

## 2022-02-28 ENCOUNTER — APPOINTMENT (OUTPATIENT)
Dept: CARDIAC REHAB | Age: 42
End: 2022-02-28

## 2022-03-03 ENCOUNTER — APPOINTMENT (OUTPATIENT)
Dept: CARDIAC REHAB | Age: 42
End: 2022-03-03

## 2022-03-07 ENCOUNTER — APPOINTMENT (OUTPATIENT)
Dept: CARDIAC REHAB | Age: 42
End: 2022-03-07

## 2022-03-10 ENCOUNTER — APPOINTMENT (OUTPATIENT)
Dept: CARDIAC REHAB | Age: 42
End: 2022-03-10

## 2022-03-14 ENCOUNTER — APPOINTMENT (OUTPATIENT)
Dept: CARDIAC REHAB | Age: 42
End: 2022-03-14

## 2022-03-17 ENCOUNTER — APPOINTMENT (OUTPATIENT)
Dept: CARDIAC REHAB | Age: 42
End: 2022-03-17

## 2022-03-18 PROBLEM — G47.00 INSOMNIA DISORDER WITH NON-SLEEP DISORDER MENTAL COMORBIDITY: Status: ACTIVE | Noted: 2020-03-24

## 2022-03-18 PROBLEM — I20.0 UNSTABLE ANGINA (HCC): Status: ACTIVE | Noted: 2019-11-28

## 2022-03-18 PROBLEM — E10.3553 STABLE PROLIFERATIVE DIABETIC RETINOPATHY OF BOTH EYES ASSOCIATED WITH TYPE 1 DIABETES MELLITUS (HCC): Status: ACTIVE | Noted: 2022-01-19

## 2022-03-19 PROBLEM — F06.4 ANXIETY DISORDER DUE TO GENERAL MEDICAL CONDITION WITH PANIC ATTACK: Status: ACTIVE | Noted: 2020-03-24

## 2022-03-19 PROBLEM — F34.1 DYSTHYMIA: Status: ACTIVE | Noted: 2020-03-24

## 2022-03-19 PROBLEM — F43.29 ADJUSTMENT DISORDER WITH MIXED EMOTIONAL FEATURES: Status: ACTIVE | Noted: 2020-03-24

## 2022-03-19 PROBLEM — R42 DIZZY SPELLS: Status: ACTIVE | Noted: 2021-08-23

## 2022-03-19 PROBLEM — M47.22 CERVICAL SPONDYLOSIS WITH RADICULOPATHY: Status: ACTIVE | Noted: 2021-12-03

## 2022-03-19 PROBLEM — E66.01 SEVERE OBESITY (HCC): Status: ACTIVE | Noted: 2019-05-15

## 2022-03-19 PROBLEM — R07.9 CHEST PAIN: Status: ACTIVE | Noted: 2019-11-28

## 2022-03-19 PROBLEM — M54.50 LOW BACK PAIN: Status: ACTIVE | Noted: 2022-01-14

## 2022-03-19 PROBLEM — F41.0 ANXIETY DISORDER DUE TO GENERAL MEDICAL CONDITION WITH PANIC ATTACK: Status: ACTIVE | Noted: 2020-03-24

## 2022-03-19 PROBLEM — I63.81 MULTIPLE LACUNAR INFARCTS (HCC): Status: ACTIVE | Noted: 2021-08-23

## 2022-03-20 PROBLEM — E11.21 TYPE 2 DIABETES WITH NEPHROPATHY (HCC): Status: ACTIVE | Noted: 2020-01-27

## 2022-03-20 PROBLEM — G45.1 TRANSIENT ISCHEMIC ATTACK INVOLVING LEFT INTERNAL CAROTID ARTERY: Status: ACTIVE | Noted: 2019-07-15

## 2022-03-20 PROBLEM — I25.110 CORONARY ARTERY DISEASE INVOLVING NATIVE CORONARY ARTERY OF NATIVE HEART WITH UNSTABLE ANGINA PECTORIS (HCC): Status: ACTIVE | Noted: 2019-12-03

## 2022-03-20 PROBLEM — S05.01XA CORNEAL ABRASION, RIGHT: Status: ACTIVE | Noted: 2022-01-19

## 2022-03-21 ENCOUNTER — APPOINTMENT (OUTPATIENT)
Dept: CARDIAC REHAB | Age: 42
End: 2022-03-21

## 2022-03-24 ENCOUNTER — APPOINTMENT (OUTPATIENT)
Dept: CARDIAC REHAB | Age: 42
End: 2022-03-24

## 2022-03-28 ENCOUNTER — APPOINTMENT (OUTPATIENT)
Dept: CARDIAC REHAB | Age: 42
End: 2022-03-28

## 2022-03-31 ENCOUNTER — APPOINTMENT (OUTPATIENT)
Dept: CARDIAC REHAB | Age: 42
End: 2022-03-31

## 2022-04-04 ENCOUNTER — APPOINTMENT (OUTPATIENT)
Dept: CARDIAC REHAB | Age: 42
End: 2022-04-04

## 2022-04-06 ENCOUNTER — HOSPITAL ENCOUNTER (EMERGENCY)
Age: 42
Discharge: HOME | End: 2022-04-06
Attending: STUDENT IN AN ORGANIZED HEALTH CARE EDUCATION/TRAINING PROGRAM
Payer: COMMERCIAL

## 2022-04-06 ENCOUNTER — APPOINTMENT (OUTPATIENT)
Dept: ULTRASOUND IMAGING | Age: 42
End: 2022-04-06
Attending: PHYSICIAN ASSISTANT
Payer: COMMERCIAL

## 2022-04-06 ENCOUNTER — APPOINTMENT (OUTPATIENT)
Dept: CT IMAGING | Age: 42
End: 2022-04-06
Attending: PHYSICIAN ASSISTANT
Payer: COMMERCIAL

## 2022-04-06 VITALS
OXYGEN SATURATION: 100 % | SYSTOLIC BLOOD PRESSURE: 149 MMHG | DIASTOLIC BLOOD PRESSURE: 70 MMHG | HEART RATE: 82 BPM | RESPIRATION RATE: 16 BRPM | BODY MASS INDEX: 38.82 KG/M2 | HEIGHT: 69 IN | TEMPERATURE: 97.8 F | WEIGHT: 262.13 LBS

## 2022-04-06 DIAGNOSIS — R52 INTRACTABLE PAIN: Primary | ICD-10-CM

## 2022-04-06 DIAGNOSIS — N50.811 TESTICULAR PAIN, RIGHT: ICD-10-CM

## 2022-04-06 DIAGNOSIS — R59.0 LYMPHADENOPATHY, INGUINAL: ICD-10-CM

## 2022-04-06 LAB
ALBUMIN SERPL-MCNC: 3.5 G/DL (ref 3.5–5)
ALBUMIN/GLOB SERPL: 1 {RATIO} (ref 1.1–2.2)
ALP SERPL-CCNC: 91 U/L (ref 45–117)
ALT SERPL-CCNC: 55 U/L (ref 12–78)
ANION GAP SERPL CALC-SCNC: 4 MMOL/L (ref 5–15)
APPEARANCE UR: CLEAR
AST SERPL-CCNC: 20 U/L (ref 15–37)
BACTERIA URNS QL MICRO: NEGATIVE /HPF
BASOPHILS # BLD: 0.1 K/UL (ref 0–0.1)
BASOPHILS NFR BLD: 1 % (ref 0–1)
BILIRUB SERPL-MCNC: 0.7 MG/DL (ref 0.2–1)
BILIRUB UR QL: NEGATIVE
BUN SERPL-MCNC: 15 MG/DL (ref 6–20)
BUN/CREAT SERPL: 15 (ref 12–20)
CALCIUM SERPL-MCNC: 8.8 MG/DL (ref 8.5–10.1)
CHLORIDE SERPL-SCNC: 101 MMOL/L (ref 97–108)
CO2 SERPL-SCNC: 26 MMOL/L (ref 21–32)
COLOR UR: YELLOW
CREAT BLD-MCNC: 0.89 MG/DL (ref 0.51–1.19)
CREAT SERPL-MCNC: 1.03 MG/DL (ref 0.7–1.3)
DIFFERENTIAL METHOD BLD: ABNORMAL
EOSINOPHIL # BLD: 0.3 K/UL (ref 0–0.4)
EOSINOPHIL NFR BLD: 4 % (ref 0–7)
EPITH CASTS URNS QL MICRO: ABNORMAL /LPF
ERYTHROCYTE [DISTWIDTH] IN BLOOD BY AUTOMATED COUNT: 14 % (ref 11.5–14.5)
GLOBULIN SER CALC-MCNC: 3.4 G/DL (ref 2–4)
GLUCOSE SERPL-MCNC: 440 MG/DL (ref 65–100)
GLUCOSE UR STRIP.AUTO-MCNC: >1000 MG/DL
HCT VFR BLD AUTO: 37.1 % (ref 36.6–50.3)
HGB BLD-MCNC: 12.4 G/DL (ref 12.1–17)
HGB UR QL STRIP: NEGATIVE
HYALINE CASTS URNS QL MICRO: ABNORMAL /LPF (ref 0–5)
IMM GRANULOCYTES # BLD AUTO: 0.1 K/UL (ref 0–0.04)
IMM GRANULOCYTES NFR BLD AUTO: 1 % (ref 0–0.5)
KETONES UR QL STRIP.AUTO: NEGATIVE MG/DL
LEUKOCYTE ESTERASE UR QL STRIP.AUTO: NEGATIVE
LYMPHOCYTES # BLD: 1.8 K/UL (ref 0.8–3.5)
LYMPHOCYTES NFR BLD: 27 % (ref 12–49)
MCH RBC QN AUTO: 27.8 PG (ref 26–34)
MCHC RBC AUTO-ENTMCNC: 33.4 G/DL (ref 30–36.5)
MCV RBC AUTO: 83.2 FL (ref 80–99)
MONOCYTES # BLD: 0.5 K/UL (ref 0–1)
MONOCYTES NFR BLD: 8 % (ref 5–13)
NEUTS SEG # BLD: 4.1 K/UL (ref 1.8–8)
NEUTS SEG NFR BLD: 59 % (ref 32–75)
NITRITE UR QL STRIP.AUTO: NEGATIVE
NRBC # BLD: 0 K/UL (ref 0–0.01)
NRBC BLD-RTO: 0 PER 100 WBC
PH UR STRIP: 5.5 [PH] (ref 5–8)
PLATELET # BLD AUTO: 144 K/UL (ref 150–400)
PMV BLD AUTO: 9.6 FL (ref 8.9–12.9)
POTASSIUM SERPL-SCNC: 4.8 MMOL/L (ref 3.5–5.1)
PROT SERPL-MCNC: 6.9 G/DL (ref 6.4–8.2)
PROT UR STRIP-MCNC: NEGATIVE MG/DL
RBC # BLD AUTO: 4.46 M/UL (ref 4.1–5.7)
RBC #/AREA URNS HPF: ABNORMAL /HPF (ref 0–5)
SODIUM SERPL-SCNC: 131 MMOL/L (ref 136–145)
SP GR UR REFRACTOMETRY: 1.02 (ref 1–1.03)
UA: UC IF INDICATED,UAUC: ABNORMAL
UROBILINOGEN UR QL STRIP.AUTO: 0.2 EU/DL (ref 0.2–1)
WBC # BLD AUTO: 6.8 K/UL (ref 4.1–11.1)
WBC URNS QL MICRO: ABNORMAL /HPF (ref 0–4)

## 2022-04-06 PROCEDURE — 99285 EMERGENCY DEPT VISIT HI MDM: CPT

## 2022-04-06 PROCEDURE — 76870 US EXAM SCROTUM: CPT

## 2022-04-06 PROCEDURE — 74011250637 HC RX REV CODE- 250/637: Performed by: PHYSICIAN ASSISTANT

## 2022-04-06 PROCEDURE — 74177 CT ABD & PELVIS W/CONTRAST: CPT

## 2022-04-06 PROCEDURE — 85025 COMPLETE CBC W/AUTO DIFF WBC: CPT

## 2022-04-06 PROCEDURE — 74011000636 HC RX REV CODE- 636: Performed by: STUDENT IN AN ORGANIZED HEALTH CARE EDUCATION/TRAINING PROGRAM

## 2022-04-06 PROCEDURE — 81001 URINALYSIS AUTO W/SCOPE: CPT

## 2022-04-06 PROCEDURE — 82565 ASSAY OF CREATININE: CPT

## 2022-04-06 PROCEDURE — 80053 COMPREHEN METABOLIC PANEL: CPT

## 2022-04-06 PROCEDURE — 36415 COLL VENOUS BLD VENIPUNCTURE: CPT

## 2022-04-06 RX ORDER — ACETAMINOPHEN 325 MG/1
650 TABLET ORAL
Qty: 20 TABLET | Refills: 0 | Status: SHIPPED | OUTPATIENT
Start: 2022-04-06 | End: 2022-04-06

## 2022-04-06 RX ORDER — OXYCODONE HYDROCHLORIDE 5 MG/1
10 TABLET ORAL ONCE
Status: COMPLETED | OUTPATIENT
Start: 2022-04-06 | End: 2022-04-06

## 2022-04-06 RX ORDER — OXYCODONE HYDROCHLORIDE 5 MG/1
5 TABLET ORAL
Qty: 10 TABLET | Refills: 0 | Status: SHIPPED | OUTPATIENT
Start: 2022-04-06 | End: 2022-04-06

## 2022-04-06 RX ORDER — HYDROCODONE BITARTRATE AND ACETAMINOPHEN 5; 325 MG/1; MG/1
1 TABLET ORAL
Qty: 18 TABLET | Refills: 0 | Status: SHIPPED | OUTPATIENT
Start: 2022-04-06 | End: 2022-04-09

## 2022-04-06 RX ADMIN — IOPAMIDOL 100 ML: 755 INJECTION, SOLUTION INTRAVENOUS at 11:38

## 2022-04-06 RX ADMIN — OXYCODONE HYDROCHLORIDE 10 MG: 5 TABLET ORAL at 09:37

## 2022-04-06 NOTE — Clinical Note
[facility] EMERGENCY DEPT  [address] [place] [id number] [phone]    Work/School Note    Date: 4/6/2022    To Whom It May concern:    Kelton Feliciano was seen and treated today in the emergency room by the following provider(s):  Attending Provider: Ana Morse MD  Physician Assistant: LOLLY Waddell. Kelton Feliciano is excused from work/school on 04/06/22 and 04/07/22. He is medically clear to return to work/school on 4/8/2022.        Sincerely,          LOLLY Goode

## 2022-04-06 NOTE — ED PROVIDER NOTES
EMERGENCY DEPARTMENT HISTORY AND PHYSICAL EXAM      Date: 4/6/2022  Patient Name: Zofia Caballero    History of Presenting Illness     Chief Complaint   Patient presents with    Hip Pain     right hip pain radiating from right groin feels like someone kicked him; onset pain a week ago it is radiating down right leg; no position of comfort-pain is making it hard to sleep       History Provided By: Patient    HPI: Zofia Caballero, 36 y.o. male with a past medical history of coronary artery disease, hypertension, diabetes who presents emergency department with right hip and groin pain. His symptoms began 1 week ago with pain on the outside of his right hip. It has been persistent since and now complains of an achy right testicular pain. Pain radiates down the inside of his thigh. The pain has been constant, though at times waxing and waning in severity. He denies any alleviating or exacerbating factors. Had some nausea, but no vomiting diarrhea or bloody stools. He denies any fevers, sore throat, congestion, difficulty breathing, abdominal pain, dysuria, hematuria, penile discharge, testicular swelling or redness. He is not have any concern for STD. He had a rash on both of his hips a few weeks ago that was itchy but Subsequently resolved. He denies any new rash. There are no other complaints, changes, or physical findings at this time. PCP: Pablo Apley, MD    No current facility-administered medications on file prior to encounter. Current Outpatient Medications on File Prior to Encounter   Medication Sig Dispense Refill    levothyroxine (SYNTHROID) 112 mcg tablet       rosuvastatin (CRESTOR) 40 mg tablet Take 40 mg by mouth daily.  testosterone (ANDROGEL) 20.25 mg/1.25 gram (1.62 %) gel Apply 2 pump presses daily. Ok to dispense generic testosterone. 1 Each 3    lidocaine (XYLOCAINE) 2 % solution Take 15 mL by mouth as needed for Pain.  Gargle and spit out to help with pain 1 Each 0    butalbital-acetaminophen-caffeine (FIORICET, ESGIC) -40 mg per tablet Take 1 Tablet by mouth every six (6) hours as needed for Headache. Indications: a migraine headache 10 Tablet 0    famotidine (PEPCID) 40 mg tablet Take 1 Tablet by mouth daily. 30 Tablet 3    busPIRone (BUSPAR) 30 mg tablet Take 1 Tablet by mouth daily. (Patient taking differently: Take 15 mg by mouth two (2) times a day.) 90 Tablet 1    insulin glargine (Lantus U-100 Insulin) 100 unit/mL injection INJECT 90 UNITS SUBCUTANEOUSLY IN THE MORNING AND 90 UNITS AT NIGHT 60 mL 5    rosuvastatin (CRESTOR) 20 mg tablet Take 20 mg by mouth nightly. (Patient not taking: Reported on 2/18/2022)      sertraline (ZOLOFT) 100 mg tablet Take 1 tablet by mouth once daily 30 Tablet 4    clopidogreL (PLAVIX) 75 mg tab Take 1 Tablet by mouth daily. 30 Tablet 12    lisinopriL (PRINIVIL, ZESTRIL) 20 mg tablet Take 1 Tablet by mouth daily. (Patient taking differently: Take 10 mg by mouth daily.) 30 Tablet 12    metFORMIN (GLUCOPHAGE) 500 mg tablet Take 1 Tablet by mouth two (2) times daily (with meals). 180 Tablet 3    insulin regular (HumuLIN R Regular U-100 Insuln) 100 unit/mL injection 30 units with breakfast,30 units with lunch and  30 units with dinner plus correction. 160 units per day max (Patient taking differently: 10-30 units with breakfast, 10-30 units with lunch and  10-30 units with dinner plus correction. 160 units per day max) 50 mL 3    flash glucose sensor (FreeStyle Mikal 2 Sensor) kit Change sensor every 14 days (Patient not taking: Reported on 2/18/2022) 2 Kit 3    flash glucose scanning reader (FreeStyle Mikal 2 [place]) misc Change sensor every 14 days (Patient not taking: Reported on 2/18/2022) 1 Each 0    cholecalciferol (Vitamin D3) (5000 Units/125 mcg) tab tablet Take 1 Tab by mouth daily. 90 Tab 1    Insulin Syringe-Needle U-100 (BD Insulin Syringe) 1 mL 25 x 1\" syrg Use for drawing up/injecting testosterone once weekly.  100 Each 3    Syringe with Needle, Disp, 1 mL 25 gauge x 5/8\" syrg Use with testosterone once every week (Patient not taking: Reported on 2/18/2022) 50 Syringe 3    aluminum & magnesium hydroxide-simethicone (Maalox Maximum Strength) 400-400-40 mg/5 mL suspension Take 10 mL by mouth every six (6) hours as needed for Indigestion.  nitroglycerin (NITROSTAT) 0.4 mg SL tablet Take 1 Tab by mouth every five (5) minutes as needed for Chest Pain. Sit down then put one tab under the tongue every 5 minutes as needed 1 Bottle 0    metoprolol succinate (TOPROL-XL) 25 mg XL tablet Take 1 Tab by mouth daily. (Patient taking differently: Take 25 mg by mouth two (2) times a day.) 90 Tab 3    aspirin delayed-release 81 mg tablet Take 1 Tab by mouth daily.  [address] [place] Tab 0       Past History     Past Medical History:  Past Medical History:   Diagnosis Date    Anxiety and depression     Bleeding of eye, left     8/17/21 pt reports receiving injections in right eye for beeding    Chronic headaches 2007    COVID-19 12/20/2020    Diabetes type 1, uncontrolled ([address]     since 8years old    GERD (gastroesophageal reflux disease)     Hypercholesterolemia     Hypertension     Hypothyroidism     MI (myocardial infarction) ([address]     as of 8/17/21 pt reports 8 stents total    WALLY on CPAP        Past Surgical History:  Past Surgical History:   Procedure Laterality Date    HX CARPAL TUNNEL RELEASE Left 2012    HX CARPAL TUNNEL RELEASE Right 2018    CTE trigger thumb and index finger    HX HEART CATHETERIZATION      HX HEENT      HX LAP CHOLECYSTECTOMY  8/26/14    Dr Eduin Manzano    HX WISDOM TEETH EXTRACTION         Family History:  Family History   Problem Relation Age of Onset    Diabetes Mother     Hypertension Mother     Heart Disease Father     Cancer Father     Diabetes Father     Diabetes Paternal Aunt     Diabetes Maternal Grandmother     Thyroid Cancer Maternal Grandmother     Kidney Disease Maternal Grandmother  Arthritis Maternal Grandmother     Heart Disease Maternal Grandfather     High Cholesterol Maternal Grandfather     Hypertension Maternal Grandfather     Diabetes Paternal Grandmother     Hemophilia Paternal Grandfather        Social History:  Social History     Tobacco Use    Smoking status: Former Smoker     Packs/day: 0.00     Years: 14.00     Pack years: 0.00     Quit date: 2014     Years since quittin.2    Smokeless tobacco: Never Used   Vaping Use    Vaping Use: Never used   Substance Use Topics    Alcohol use: No    Drug use: No       Allergies: Allergies   Allergen Reactions    Morphine Nausea and Vomiting    Penicillin G Swelling    Percocet [Oxycodone-Acetaminophen] Hives and Nausea and Vomiting     Pt is allergic to Oxycodone, has previously tolerated Tylenol and Hydrocodone.  Sulfa (Sulfonamide Antibiotics) Swelling    Tramadol Nausea Only     Nausea and headache           Review of Systems   Review of Systems   Constitutional: Negative for chills and fever. HENT: Negative for congestion and sore throat. Respiratory: Negative for cough and shortness of breath. Cardiovascular: Negative for chest pain. Gastrointestinal: Negative for abdominal pain, diarrhea, nausea and vomiting. Genitourinary: Positive for testicular pain. Negative for dysuria and hematuria. Musculoskeletal: Positive for arthralgias. Negative for myalgias. Skin: Negative for rash. Neurological: Negative for headaches. All other systems reviewed and are negative. Physical Exam   Physical Exam  Vitals and nursing note reviewed. Exam conducted with a chaperone present. Constitutional:       General: He is not in acute distress. Appearance: He is not toxic-appearing. HENT:      Head: Atraumatic.       Right Ear: External ear normal.      Left Ear: External ear normal.      Nose: Nose normal.      Mouth/Throat:      Mouth: Mucous membranes are moist.   Eyes:      Extraocular Movements: Extraocular movements intact. Conjunctiva/sclera: Conjunctivae normal.   Cardiovascular:      Rate and Rhythm: Normal rate and regular rhythm. Pulses: Normal pulses. Heart sounds: Normal heart sounds. No murmur heard. No friction rub. No gallop. Pulmonary:      Effort: Pulmonary effort is normal. No respiratory distress. Breath sounds: Normal breath sounds. No stridor. No wheezing, rhonchi or rales. Abdominal:      General: Abdomen is flat. There is no distension. Palpations: Abdomen is soft. Tenderness: There is no abdominal tenderness. There is no guarding or rebound. Genitourinary:     Penis: Normal.       Testes: Normal.      Comments: Penis uncircumcised. Foreskin retracted with no lesions swelling or redness. Scrotum normal to inspection with no erythema, swelling or rash. Right testicle tenderness to palpation over posterior and anterior aspect. No testicular swelling. Testicular lie normal.  No hernias  Musculoskeletal:         General: Tenderness present. No swelling. Normal range of motion. Cervical back: Neck supple. Comments: Hip normal to inspection other than mild intertriginous redness, which patient states is chronic. Range of hip normal with active and passive range of motion with only mild pain. Pain worse with external rotation of the leg. Tenderness over right trochanteric bursa sac. Skin:     General: Skin is warm. Neurological:      Mental Status: He is alert and oriented to person, place, and time. Psychiatric:         Mood and Affect: Mood normal.         Behavior: Behavior normal.         Thought Content:  Thought content normal.         Judgment: Judgment normal.         Diagnostic Study Results     Labs -     Recent Results (from the past 12 hour(s))   URINALYSIS W/ REFLEX CULTURE    Collection Time: 04/06/22 10:16 AM    Specimen: Urine   Result Value Ref Range    Color YELLOW      Appearance CLEAR CLEAR      Specific gravity 1.025 1.003 - 1.030      pH (UA) 5.5 5.0 - 8.0      Protein Negative NEG mg/dL    Glucose >1,000 (A) NEG mg/dL    Ketone Negative NEG mg/dL    Bilirubin Negative NEG      Blood Negative NEG      Urobilinogen 0.2 0.2 - 1.0 EU/dL    Nitrites Negative NEG      Leukocyte Esterase Negative NEG      UA:UC IF INDICATED CULTURE NOT INDICATED BY UA RESULT CNI      WBC 0-4 0 - 4 /hpf    RBC 0-5 0 - 5 /hpf    Epithelial cells FEW FEW /lpf    Bacteria Negative NEG /hpf    Hyaline cast 0-2 0 - 5 /lpf   CREATININE, POC    Collection Time: 04/06/22 11:21 AM   Result Value Ref Range    Creatinine (POC) 0.89 0.51 - 1.19 mg/dL    GFRAA, POC >60 >60 ml/min/1.73m2    GFRNA, POC >60 >60 ml/min/1.73m2   CBC WITH AUTOMATED DIFF    Collection Time: 04/06/22 11:23 AM   Result Value Ref Range    WBC 6.8 4.1 - 11.1 K/uL    RBC 4.46 4.10 - 5.70 M/uL    HGB 12.4 12.1 - 17.0 g/dL    HCT 37.1 36.6 - 50.3 %    MCV 83.2 80.0 - 99.0 FL    MCH 27.8 26.0 - 34.0 PG    MCHC 33.4 30.0 - 36.5 g/dL    RDW 14.0 11.5 - 14.5 %    PLATELET 290 (L) 307 - 400 K/uL    MPV 9.6 8.9 - 12.9 FL    NRBC 0.0 0  WBC    ABSOLUTE NRBC 0.00 0.00 - 0.01 K/uL    NEUTROPHILS 59 32 - 75 %    LYMPHOCYTES 27 12 - 49 %    MONOCYTES 8 5 - 13 %    EOSINOPHILS 4 0 - 7 %    BASOPHILS 1 0 - 1 %    IMMATURE GRANULOCYTES 1 (H) 0.0 - 0.5 %    ABS. NEUTROPHILS 4.1 1.8 - 8.0 K/UL    ABS. LYMPHOCYTES 1.8 0.8 - 3.5 K/UL    ABS. MONOCYTES 0.5 0.0 - 1.0 K/UL    ABS. EOSINOPHILS 0.3 0.0 - 0.4 K/UL    ABS. BASOPHILS 0.1 0.0 - 0.1 K/UL    ABS. IMM.  GRANS. 0.1 (H) 0.00 - 0.04 K/UL    DF AUTOMATED     METABOLIC PANEL, COMPREHENSIVE    Collection Time: 04/06/22 11:23 AM   Result Value Ref Range    Sodium 131 (L) 136 - 145 mmol/L    Potassium 4.8 3.5 - 5.1 mmol/L    Chloride 101 97 - 108 mmol/L    CO2 26 21 - 32 mmol/L    Anion gap 4 (L) 5 - 15 mmol/L    Glucose 440 (H) 65 - 100 mg/dL    BUN 15 6 - 20 MG/DL    Creatinine 1.03 0.70 - 1.30 MG/DL    BUN/Creatinine ratio 15 12 - 20      GFR est AA >60 >60 ml/min/1.73m2    GFR est non-AA >60 >60 ml/min/1.73m2    Calcium 8.8 8.5 - 10.1 MG/DL    Bilirubin, total 0.7 0.2 - 1.0 MG/DL    ALT (SGPT) 55 12 - 78 U/L    AST (SGOT) 20 15 - 37 U/L    Alk. phosphatase 91 45 - 117 U/L    Protein, total 6.9 6.4 - 8.2 g/dL    Albumin 3.5 3.5 - 5.0 g/dL    Globulin 3.4 2.0 - 4.0 g/dL    A-G Ratio 1.0 (L) 1.1 - 2.2         Radiologic Studies -   CT ABD PELV W CONT   Final Result   No acute abnormality. US SCROTUM/TESTICLES   Final Result   Bilateral microlithiasis. Prominent right inguinal lymph nodes. No   acute abnormality. CT Results  (Last 48 hours)               04/06/22 1138  CT ABD PELV W CONT Final result    Impression:  No acute abnormality. Narrative:  EXAM: CT ABD PELV W CONT       INDICATION: RLQ/hip/testicular pain       COMPARISON: 12/15/2021        CONTRAST: 100 mL of Isovue-370. ORAL CONTRAST: None       TECHNIQUE:    Following the uneventful intravenous administration of contrast, thin axial   images were obtained through the abdomen and pelvis. Coronal and sagittal   reconstructions were generated. CT dose reduction was achieved through use of a   standardized protocol tailored for this examination and automatic exposure   control for dose modulation. FINDINGS:    LOWER THORAX: No significant abnormality in the incidentally imaged lower chest.   LIVER: No mass. BILIARY TREE: Status post cholecystectomy. CBD is not dilated. SPLEEN: within normal limits. PANCREAS: No mass or ductal dilatation. ADRENALS: Unremarkable. KIDNEYS: No mass, calculus, or hydronephrosis. STOMACH: Unremarkable. SMALL BOWEL: No dilatation or wall thickening. COLON: No dilatation or wall thickening. APPENDIX: Within normal limits. PERITONEUM: No ascites or pneumoperitoneum. RETROPERITONEUM: No lymphadenopathy or aortic aneurysm. REPRODUCTIVE ORGANS: There is no pelvic mass or lymphadenopathy.    URINARY BLADDER: No mass or calculus. BONES: No destructive bone lesion. ABDOMINAL WALL: No mass or hernia. ADDITIONAL COMMENTS: N/A               CXR Results  (Last 48 hours)    None            Medical Decision Making   I am the first provider for this patient. I reviewed the vital signs, available nursing notes, past medical history, past surgical history, family history and social history. Vital Signs-Reviewed the patient's vital signs. Patient Vitals for the past 12 hrs:   Temp Pulse Resp BP SpO2   04/06/22 0840 97.8 °F (36.6 °C) 82 16 (!) 149/70 100 %       Records Reviewed: Nursing Notes    Provider Notes (Medical Decision Making):   Patient evaluated for 1 week history of progressive right hip pain that had migrated to his right testicle. Differential diagnosis includes but not limited to, testicular torsion , epididymitis , cellulitis, septic hip , appendicitis, psoas abscess, transient synovitis, soft tissue strain, reactive lymphadenitis. Patient was otherwise well-appearing, afebrile, nontoxic-appearing examination of both his abdomen, hip and scrotum/genitals not reveal any acute abnormality aside from mild testicular tenderness and reproducible pain with range of motion of the hip. Patient only had mild pain with movement of his hip, nor was any significant swelling or erythema. I doubt this is septic arthritis. Ultrasound did not reveal any testicular abnormality, and given the length of patient's symptoms and overall appearance in the ED, this is inconsistent with testicular torsion. Given the pain began in his hip and unremarkable urinalysis, this is inconsistent with epididymitis. This differential and work-up was discussed with the patient. Through shared decision-making, patient elected to have CT of his abdomen and pelvis performed, which did not reveal any acutefindings.  I discussed the case with the attending physician Dr Lucas Thakur, and we agreed that his symptoms did not represent an emergent process and possibly a synovitis or reactive lymphadenopathy. We discussed his inguinal lymphadenopathy in the overall diagnostic uncertainty of his presentation and current work up. We agreed to conservative management, close follow up with his PCP in the next 48 hours, as well as strict return precautions to the ED. There was no signs of localized infection or indications otherwise for antibiotic treatment. Patient expressed understanding and agreement with the treatment plan. ED Course:   Initial assessment performed. The patients presenting problems have been discussed, and they are in agreement with the care plan formulated and outlined with them. I have encouraged them to ask questions as they arise throughout their visit. Critical Care Time: None    Disposition:   The patient's emergency department evaluation is now complete. I have reviewed all labs, imaging, and pertinent information. I have discussed all results with the patient and/or family. Based on our evaluation today I do believe that the patient is safe to be discharged home. The patient has been provided with at home instructions that are pertinent to their complaint today, although these may not be specific to the exact diagnosis. I have reviewed the patient's home medications and attempted to reconcile if not already done so by pharmacy or nursing staff. I have discussed all new prescriptions with the patient. The patient has been encouraged to follow-up with primary care doctor and/or specialist, and these have been discussed with the patient. The patient has been advised that they may return to the emergency department if they have any worsening symptoms and or new symptoms that are of concern to them. Verbal discharge instructions may have also been provided to the patient that may not be specifically contained in the written discharge instructions. The patient has been given opportunity to ask questions prior to discharge. PLAN:  1. Discharge Medication List as of 4/6/2022 12:50 PM      START taking these medications    Details   oxyCODONE IR (Roxicodone) 5 mg immediate release tablet Take 1 Tablet by mouth every six (6) hours as needed for Pain for up to 5 days. Max Daily Amount: 20 mg., Normal, Disp-10 Tablet, R-0      acetaminophen (TYLENOL) 325 mg tablet Take 2 Tablets by mouth every four (4) hours as needed for Pain., Normal, Disp-20 Tablet, R-0         CONTINUE these medications which have NOT CHANGED    Details   levothyroxine (SYNTHROID) 112 mcg tablet Historical Med      !! rosuvastatin (CRESTOR) 40 mg tablet Take 40 mg by mouth daily. , Historical Med      testosterone (ANDROGEL) 20.25 mg/1.25 gram (1.62 %) gel Apply 2 pump presses daily. Ok to dispense generic testosterone., Normal, Disp-1 Each, R-3      lidocaine (XYLOCAINE) 2 % solution Take 15 mL by mouth as needed for Pain. Gargle and spit out to help with pain, Normal, Disp-1 Each, R-0      butalbital-acetaminophen-caffeine (FIORICET, ESGIC) -40 mg per tablet Take 1 Tablet by mouth every six (6) hours as needed for Headache. Indications: a migraine headache, Normal, Disp-10 Tablet, R-0      famotidine (PEPCID) 40 mg tablet Take 1 Tablet by mouth daily. , Normal, Disp-30 Tablet, R-3      busPIRone (BUSPAR) 30 mg tablet Take 1 Tablet by mouth daily. , Normal, Disp-90 Tablet, R-1      insulin glargine (Lantus U-100 Insulin) 100 unit/mL injection INJECT 90 UNITS SUBCUTANEOUSLY IN THE MORNING AND 90 UNITS AT NIGHT, Normal, Disp-60 mL, R-5      !! rosuvastatin (CRESTOR) 20 mg tablet Take 20 mg by mouth nightly., Historical Med      sertraline (ZOLOFT) 100 mg tablet Take 1 tablet by mouth once daily, Normal, Disp-30 Tablet, R-4      clopidogreL (PLAVIX) 75 mg tab Take 1 Tablet by mouth daily. , Normal, Disp-30 Tablet, R-12      lisinopriL (PRINIVIL, ZESTRIL) 20 mg tablet Take 1 Tablet by mouth daily. , Normal, Disp-30 Tablet, R-12      metFORMIN (GLUCOPHAGE) 500 mg tablet Take 1 Tablet by mouth two (2) times daily (with meals). , Normal, Disp-180 Tablet, R-3      insulin regular (HumuLIN R Regular U-100 Insuln) 100 unit/mL injection 30 units with breakfast,30 units with lunch and  30 units with dinner plus correction. 160 units per day max, Normal, Disp-50 mL, R-3      flash glucose sensor (FreeStyle Mikal 2 Sensor) kit Change sensor every 14 days, Normal, Disp-2 Kit, R-3      flash glucose scanning reader (FreeStyle Mikal 2 [place]) misc Change sensor every 14 days, Normal, Disp-1 Each, R-0      cholecalciferol (Vitamin D3) (5000 Units/125 mcg) tab tablet Take 1 Tab by mouth daily. , Normal, Disp-90 Tab, R-1      Insulin Syringe-Needle U-100 (BD Insulin Syringe) 1 mL 25 x 1\" syrg Use for drawing up/injecting testosterone once weekly. , Normal, Disp-100 Each,R-3      Syringe with Needle, Disp, 1 mL 25 gauge x 5/8\" syrg Use with testosterone once every week, Normal, Disp-50 Syringe,R-3      aluminum & magnesium hydroxide-simethicone (Maalox Maximum Strength) 400-400-40 mg/5 mL suspension Take 10 mL by mouth every six (6) hours as needed for Indigestion. , Historical Med      nitroglycerin (NITROSTAT) 0.4 mg SL tablet Take 1 Tab by mouth every five (5) minutes as needed for Chest Pain. Sit down then put one tab under the tongue every 5 minutes as needed, Normal, Disp-1 Bottle, R-0      metoprolol succinate (TOPROL-XL) 25 mg XL tablet Take 1 Tab by mouth daily. , Print, Disp-90 Tab, R-3      aspirin delayed-release 81 mg tablet Take 1 Tab by mouth daily. , Print, Disp-30 Tab, R-0       !! - Potential duplicate medications found. Please discuss with provider.         2.   Follow-up Information     Follow up With Specialties Details Why Contact Info    [facility]  Schedule an appointment as soon as possible for a visit   [id number] Matilde Cook [address]  In 1 day  [address]  [address] [facility] [address]    Calderon Jason MD Internal Medicine Schedule an appointment as soon as possible for a visit   [address] [place] [address]  [phone]          Return to ED if worse     Diagnosis     Clinical Impression:   1. Intractable pain    2. Testicular pain, right    3. Lymphadenopathy, inguinal          Please note that this dictation was completed with [employer], the computer voice recognition software. Quite often unanticipated grammatical, syntax, homophones, and other interpretive errors are inadvertently transcribed by the computer software. Please disregards these errors. Please excuse any errors that have escaped final proofreading.

## 2022-04-06 NOTE — DISCHARGE INSTRUCTIONS
Call your primary care doctor immediately to make an appointment within the next 1 to 2 days for reevaluation of your symptoms. You should also follow-up with a urology/orthopedic doctor and have been provided contact information you. Return to the emergency department sooner if you have any new or worsening symptoms.

## 2022-04-07 ENCOUNTER — APPOINTMENT (OUTPATIENT)
Dept: CARDIAC REHAB | Age: 42
End: 2022-04-07

## 2022-04-09 ENCOUNTER — HOSPITAL ENCOUNTER (EMERGENCY)
Age: 42
Discharge: HOME | End: 2022-04-09
Attending: EMERGENCY MEDICINE
Payer: COMMERCIAL

## 2022-04-09 ENCOUNTER — APPOINTMENT (OUTPATIENT)
Dept: ULTRASOUND IMAGING | Age: 42
End: 2022-04-09
Attending: EMERGENCY MEDICINE
Payer: COMMERCIAL

## 2022-04-09 VITALS
SYSTOLIC BLOOD PRESSURE: 165 MMHG | HEIGHT: 69 IN | TEMPERATURE: 97.8 F | WEIGHT: 262.13 LBS | BODY MASS INDEX: 38.82 KG/M2 | HEART RATE: 79 BPM | DIASTOLIC BLOOD PRESSURE: 70 MMHG | OXYGEN SATURATION: 100 % | RESPIRATION RATE: 14 BRPM

## 2022-04-09 DIAGNOSIS — N50.811 RIGHT TESTICULAR PAIN: ICD-10-CM

## 2022-04-09 DIAGNOSIS — I86.1 RIGHT VARICOCELE: Primary | ICD-10-CM

## 2022-04-09 DIAGNOSIS — M54.31 SCIATICA OF RIGHT SIDE: ICD-10-CM

## 2022-04-09 PROCEDURE — 76870 US EXAM SCROTUM: CPT

## 2022-04-09 PROCEDURE — 99284 EMERGENCY DEPT VISIT MOD MDM: CPT

## 2022-04-09 PROCEDURE — 74011000250 HC RX REV CODE- 250: Performed by: EMERGENCY MEDICINE

## 2022-04-09 PROCEDURE — 74011250636 HC RX REV CODE- 250/636: Performed by: EMERGENCY MEDICINE

## 2022-04-09 PROCEDURE — 74011250637 HC RX REV CODE- 250/637: Performed by: EMERGENCY MEDICINE

## 2022-04-09 PROCEDURE — 96372 THER/PROPH/DIAG INJ SC/IM: CPT

## 2022-04-09 RX ORDER — KETOROLAC TROMETHAMINE 30 MG/ML
30 INJECTION, SOLUTION INTRAMUSCULAR; INTRAVENOUS
Status: COMPLETED | OUTPATIENT
Start: 2022-04-09 | End: 2022-04-09

## 2022-04-09 RX ORDER — ACETAMINOPHEN 500 MG
1000 TABLET ORAL
Status: COMPLETED | OUTPATIENT
Start: 2022-04-09 | End: 2022-04-09

## 2022-04-09 RX ORDER — OXYCODONE HYDROCHLORIDE 5 MG/1
5 TABLET ORAL
Status: COMPLETED | OUTPATIENT
Start: 2022-04-09 | End: 2022-04-09

## 2022-04-09 RX ORDER — METHYLPREDNISOLONE 4 MG/1
TABLET ORAL
Qty: 1 DOSE PACK | Refills: 0 | Status: SHIPPED | OUTPATIENT
Start: 2022-04-09 | End: 2022-04-28

## 2022-04-09 RX ORDER — LIDOCAINE 4 G/100G
1 PATCH TOPICAL
Status: DISCONTINUED | OUTPATIENT
Start: 2022-04-09 | End: 2022-04-09 | Stop reason: HOSPADM

## 2022-04-09 RX ADMIN — OXYCODONE 5 MG: 5 TABLET ORAL at 08:02

## 2022-04-09 RX ADMIN — ACETAMINOPHEN 1000 MG: 500 TABLET ORAL at 08:01

## 2022-04-09 RX ADMIN — KETOROLAC TROMETHAMINE 30 MG: 30 INJECTION, SOLUTION INTRAMUSCULAR at 08:01

## 2022-04-09 NOTE — DISCHARGE INSTRUCTIONS
You were evaluated in the emergency department for right testicular pain, right hip pain, right-sided back pain. Your examination was reassuring as was your work-up including ultrasound. It will be important for you to follow-up with your primary care physician and [facility] urology within 2-4 days. If you develop worsening symptoms such as worsening testicular pain or fevers, please return to the emergency department immediately.

## 2022-04-09 NOTE — ED PROVIDER NOTES
EMERGENCY DEPARTMENT HISTORY AND PHYSICAL EXAM      Date: 2022  Patient Name: Claire Mario    History of Presenting Illness     Chief Complaint   Patient presents with    Hip Pain     ongoing right hip and right groin pain; was here on wednesday, had multiple tests has swollen lymph glands; but pain is worse and he cannot sleep. History Provided By: Patient    HPI: Claire Mario, 36 y.o. male with history of hypertension, hypercholesterolemia, CAD, prior MI, insulin-dependent diabetes presents to the ED with cc of right hip pain, right groin pain, right testicular pain, right low back pain. Pain has been present over the past 1 week. He was seen here in the ED for same symptoms on  and underwent CT imaging and ultrasound which demonstrated right inguinal lymphadenopathy. He has been taking hydrocodone acetaminophen at home without significant relief of symptoms. Pain is now worsened and is worse when lying down and with positional changes. Pain is now radiating into the right low back and down the right lower extremity into the right knee. Denies injury or trauma. He also complains of persistent intermittent testicular pain described as a throbbing sensation. There are no other complaints, changes, or physical findings at this time. PCP: Lorraine Jose MD    No current facility-administered medications on file prior to encounter. Current Outpatient Medications on File Prior to Encounter   Medication Sig Dispense Refill    [] HYDROcodone-acetaminophen (Lorcet, HYDROcodone,) 5-325 mg per tablet Take 1 Tablet by mouth every four (4) hours as needed for Pain for up to 3 days. Max Daily Amount: 6 Tablets. 18 Tablet 0    levothyroxine (SYNTHROID) 112 mcg tablet       rosuvastatin (CRESTOR) 40 mg tablet Take 40 mg by mouth daily.  testosterone (ANDROGEL) 20.25 mg/1.25 gram (1.62 %) gel Apply 2 pump presses daily. Ok to dispense generic testosterone.  1 Each 3    lidocaine (XYLOCAINE) 2 % solution Take 15 mL by mouth as needed for Pain. Gargle and spit out to help with pain 1 Each 0    butalbital-acetaminophen-caffeine (FIORICET, ESGIC) -40 mg per tablet Take 1 Tablet by mouth every six (6) hours as needed for Headache. Indications: a migraine headache 10 Tablet 0    famotidine (PEPCID) 40 mg tablet Take 1 Tablet by mouth daily. 30 Tablet 3    busPIRone (BUSPAR) 30 mg tablet Take 1 Tablet by mouth daily. (Patient taking differently: Take 15 mg by mouth two (2) times a day.) 90 Tablet 1    insulin glargine (Lantus U-100 Insulin) 100 unit/mL injection INJECT 90 UNITS SUBCUTANEOUSLY IN THE MORNING AND 90 UNITS AT NIGHT 60 mL 5    rosuvastatin (CRESTOR) 20 mg tablet Take 20 mg by mouth nightly. (Patient not taking: Reported on 2/18/2022)      sertraline (ZOLOFT) 100 mg tablet Take 1 tablet by mouth once daily 30 Tablet 4    clopidogreL (PLAVIX) 75 mg tab Take 1 Tablet by mouth daily. 30 Tablet 12    lisinopriL (PRINIVIL, ZESTRIL) 20 mg tablet Take 1 Tablet by mouth daily. (Patient taking differently: Take 10 mg by mouth daily.) 30 Tablet 12    metFORMIN (GLUCOPHAGE) 500 mg tablet Take 1 Tablet by mouth two (2) times daily (with meals). 180 Tablet 3    insulin regular (HumuLIN R Regular U-100 Insuln) 100 unit/mL injection 30 units with breakfast,30 units with lunch and  30 units with dinner plus correction. 160 units per day max (Patient taking differently: 10-30 units with breakfast, 10-30 units with lunch and  10-30 units with dinner plus correction. 160 units per day max) 50 mL 3    flash glucose sensor (FreeStyle Mikal 2 Sensor) kit Change sensor every 14 days (Patient not taking: Reported on 2/18/2022) 2 Kit 3    flash glucose scanning reader (FreeStyle Mikal 2 [place]) misc Change sensor every 14 days (Patient not taking: Reported on 2/18/2022) 1 Each 0    cholecalciferol (Vitamin D3) (5000 Units/125 mcg) tab tablet Take 1 Tab by mouth daily.  90 Tab 1    Insulin Syringe-Needle U-100 (BD Insulin Syringe) 1 mL 25 x 1\" syrg Use for drawing up/injecting testosterone once weekly. 100 Each 3    Syringe with Needle, Disp, 1 mL 25 gauge x 5/8\" syrg Use with testosterone once every week (Patient not taking: Reported on 2/18/2022) 50 Syringe 3    aluminum & magnesium hydroxide-simethicone (Maalox Maximum Strength) 400-400-40 mg/5 mL suspension Take 10 mL by mouth every six (6) hours as needed for Indigestion.  nitroglycerin (NITROSTAT) 0.4 mg SL tablet Take 1 Tab by mouth every five (5) minutes as needed for Chest Pain. Sit down then put one tab under the tongue every 5 minutes as needed 1 Bottle 0    metoprolol succinate (TOPROL-XL) 25 mg XL tablet Take 1 Tab by mouth daily. (Patient taking differently: Take 25 mg by mouth two (2) times a day.) 90 Tab 3    aspirin delayed-release 81 mg tablet Take 1 Tab by mouth daily.  27 Tab 0       Past History     Past Medical History:  Past Medical History:   Diagnosis Date    Anxiety and depression     Bleeding of eye, left     8/17/21 pt reports receiving injections in right eye for beeding    Chronic headaches 2007    COVID-19 12/20/2020    Diabetes type 1, uncontrolled ([address]     since 8years old    GERD (gastroesophageal reflux disease)     Hypercholesterolemia     Hypertension     Hypothyroidism     MI (myocardial infarction) ([address]     as of 8/17/21 pt reports 8 stents total    WALLY on CPAP        Past Surgical History:  Past Surgical History:   Procedure Laterality Date    HX CARPAL TUNNEL RELEASE Left 2012    HX CARPAL TUNNEL RELEASE Right 2018    CTE trigger thumb and index finger    HX HEART CATHETERIZATION      HX HEENT      HX LAP CHOLECYSTECTOMY  8/26/14    Dr Bola Mercado    HX WISDOM TEETH EXTRACTION         Family History:  Family History   Problem Relation Age of Onset    Diabetes Mother     Hypertension Mother     Heart Disease Father     Cancer Father     Diabetes Father     Diabetes Paternal Aunt     Diabetes Maternal Grandmother     Thyroid Cancer Maternal Grandmother     Kidney Disease Maternal Grandmother     Arthritis Maternal Grandmother     Heart Disease Maternal Grandfather     High Cholesterol Maternal Grandfather     Hypertension Maternal Grandfather     Diabetes Paternal Grandmother     Hemophilia Paternal Grandfather        Social History:  Social History     Tobacco Use    Smoking status: Former Smoker     Packs/day: 0.00     Years: 14.00     Pack years: 0.00     Quit date: 2014     Years since quittin.2    Smokeless tobacco: Never Used   Vaping Use    Vaping Use: Never used   Substance Use Topics    Alcohol use: No    Drug use: No       Allergies: Allergies   Allergen Reactions    Morphine Nausea and Vomiting    Penicillin G Swelling    Percocet [Oxycodone-Acetaminophen] Hives and Nausea and Vomiting     Pt is allergic to Oxycodone, has previously tolerated Tylenol and Hydrocodone.  Sulfa (Sulfonamide Antibiotics) Swelling    Tramadol Nausea Only     Nausea and headache           Review of Systems   Review of Systems   Constitutional: Negative for chills and fever. Respiratory: Negative for cough and shortness of breath. Cardiovascular: Negative for chest pain and leg swelling. Gastrointestinal: Negative for abdominal pain, nausea and vomiting. Genitourinary: Positive for testicular pain. Negative for dysuria. Musculoskeletal: Positive for arthralgias, back pain and myalgias. Negative for gait problem. Skin: Negative for color change and rash. Neurological: Negative for dizziness, weakness, light-headedness and headaches. All other systems reviewed and are negative. Physical Exam   Physical Exam  Vitals and nursing note reviewed. Constitutional:       General: He is not in acute distress. Appearance: Normal appearance. He is not ill-appearing, toxic-appearing or diaphoretic. HENT:      Head: Normocephalic and atraumatic. Cardiovascular:      Rate and Rhythm: Normal rate and regular rhythm. Heart sounds: Normal heart sounds. No murmur heard. Pulmonary:      Effort: Pulmonary effort is normal. No respiratory distress. Breath sounds: Normal breath sounds. No wheezing. Abdominal:      Palpations: Abdomen is soft. Tenderness: There is no abdominal tenderness. There is no guarding or rebound. Genitourinary:     Comments: Normal uncircumcised penis. Minimal testicular pain to the lateral aspect of the right scrotum. No significant testicular mass. Normal cremasteric reflexes. Musculoskeletal:      Comments: Full range of motion right lower extremity, neurovascularly intact distally. No reproducible bony TTP. There is some TTP to the right inguinal crease with lymphadenopathy. Skin:     General: Skin is warm and dry. Findings: No erythema or rash. Neurological:      General: No focal deficit present. Mental Status: He is alert and oriented to person, place, and time. Diagnostic Study Results     Labs -  Labs Reviewed - No data to display    Radiologic Studies -   US SCROTUM/TESTICLES   Final Result   Bilateral testicular microlithiasis. Right varicocele. No acute   abnormality. CT Results  (Last 48 hours)    None        CXR Results  (Last 48 hours)    None          Medical Decision Making   I am the first provider for this patient. I reviewed the vital signs, available nursing notes, past medical history, past surgical history, family history and social history. Vital Signs-Reviewed the patient's vital signs.   Visit Vitals  BP (!) 165/70   Pulse 79   Temp 97.8 °F (36.6 °C)   Resp 14   Ht 5' 9\" (1.753 m)   Wt 118.9 kg (262 lb 2 oz)   SpO2 100%   BMI 38.71 kg/m²       Records Reviewed: Nursing Notes    Provider Notes (Medical Decision Making):   79-year-old male presenting with persistent right testicular pain, right groin pain, and pain that radiates from right low back down into right knee has been persistent after ED evaluation on 4/6 for same symptoms. Afebrile and vital signs stable. He was evaluated with CT and ultrasound 3 days ago. Abdomen is soft, benign, nontender, and nonperitoneal and do not feel need to repeat CT today. Will repeat ultrasound to rule out intermittent torsion. Differential diagnosis includes intermittent torsion, epididymitis, inguinal lymphadenopathy, musculoskeletal etiology including sciatica. He does have positive straight leg raise supporting sciatica. We will treat symptomatically, repeat ultrasound, and anticipate discharge home with steroids with close follow-up with PCP. Ultrasound negative for torsion or acute process. He does have varicocele as well as bilateral microlithiasis. Encourage follow-up with urology and patient given strict return ED precautions. We will treat presumed sciatica with course of steroids. Patient encouraged close glucose control and follow-up with PCP regarding this. ED Course:   Initial assessment performed. The patients presenting problems have been discussed, and they are in agreement with the care plan formulated and outlined with them. I have encouraged them to ask questions as they arise throughout their visit. Discharge Note:  The patient has been re-evaluated and is ready for discharge. Reviewed available results with patient. Counseled patient on diagnosis and care plan. Patient has expressed understanding, and all questions have been answered. Patient agrees with plan and agrees to follow up as recommended, or to return to the ED if their symptoms worsen. Discharge instructions have been provided and explained to the patient, along with reasons to return to the ED. Disposition:  Discharge    DISCHARGE PLAN:  1.    Discharge Medication List as of 4/9/2022 10:07 AM      START taking these medications    Details   methylPREDNISolone (Medrol, Brad,) 4 mg tablet Take by mouth as directed until completion., Normal, Disp-1 Dose Pack, R-0         CONTINUE these medications which have NOT CHANGED    Details   HYDROcodone-acetaminophen (Lorcet, HYDROcodone,) 5-325 mg per tablet Take 1 Tablet by mouth every four (4) hours as needed for Pain for up to 3 days. Max Daily Amount: 6 Tablets., Normal, Disp-18 Tablet, R-0      levothyroxine (SYNTHROID) 112 mcg tablet Historical Med      !! rosuvastatin (CRESTOR) 40 mg tablet Take 40 mg by mouth daily. , Historical Med      testosterone (ANDROGEL) 20.25 mg/1.25 gram (1.62 %) gel Apply 2 pump presses daily. Ok to dispense generic testosterone., Normal, Disp-1 Each, R-3      lidocaine (XYLOCAINE) 2 % solution Take 15 mL by mouth as needed for Pain. Gargle and spit out to help with pain, Normal, Disp-1 Each, R-0      butalbital-acetaminophen-caffeine (FIORICET, ESGIC) -40 mg per tablet Take 1 Tablet by mouth every six (6) hours as needed for Headache. Indications: a migraine headache, Normal, Disp-10 Tablet, R-0      famotidine (PEPCID) 40 mg tablet Take 1 Tablet by mouth daily. , Normal, Disp-30 Tablet, R-3      busPIRone (BUSPAR) 30 mg tablet Take 1 Tablet by mouth daily. , Normal, Disp-90 Tablet, R-1      insulin glargine (Lantus U-100 Insulin) 100 unit/mL injection INJECT 90 UNITS SUBCUTANEOUSLY IN THE MORNING AND 90 UNITS AT NIGHT, Normal, Disp-60 mL, R-5      !! rosuvastatin (CRESTOR) 20 mg tablet Take 20 mg by mouth nightly., Historical Med      sertraline (ZOLOFT) 100 mg tablet Take 1 tablet by mouth once daily, Normal, Disp-30 Tablet, R-4      clopidogreL (PLAVIX) 75 mg tab Take 1 Tablet by mouth daily. , Normal, Disp-30 Tablet, R-12      lisinopriL (PRINIVIL, ZESTRIL) 20 mg tablet Take 1 Tablet by mouth daily. , Normal, Disp-30 Tablet, R-12      metFORMIN (GLUCOPHAGE) 500 mg tablet Take 1 Tablet by mouth two (2) times daily (with meals). , Normal, Disp-180 Tablet, R-3      insulin regular (HumuLIN R Regular U-100 Insuln) 100 unit/mL injection 30 units with breakfast,30 units with lunch and  30 units with dinner plus correction. 160 units per day max, Normal, Disp-50 mL, R-3      flash glucose sensor (FreeStyle Mikal 2 Sensor) kit Change sensor every 14 days, Normal, Disp-2 Kit, R-3      flash glucose scanning reader (FreeStyle Mikal 2 [place]) misc Change sensor every 14 days, Normal, Disp-1 Each, R-0      cholecalciferol (Vitamin D3) (5000 Units/125 mcg) tab tablet Take 1 Tab by mouth daily. , Normal, Disp-90 Tab, R-1      Insulin Syringe-Needle U-100 (BD Insulin Syringe) 1 mL 25 x 1\" syrg Use for drawing up/injecting testosterone once weekly. , Normal, Disp-100 Each,R-3      Syringe with Needle, Disp, 1 mL 25 gauge x 5/8\" syrg Use with testosterone once every week, Normal, Disp-50 Syringe,R-3      aluminum & magnesium hydroxide-simethicone (Maalox Maximum Strength) 400-400-40 mg/5 mL suspension Take 10 mL by mouth every six (6) hours as needed for Indigestion. , Historical Med      nitroglycerin (NITROSTAT) 0.4 mg SL tablet Take 1 Tab by mouth every five (5) minutes as needed for Chest Pain. Sit down then put one tab under the tongue every 5 minutes as needed, Normal, Disp-1 Bottle, R-0      metoprolol succinate (TOPROL-XL) 25 mg XL tablet Take 1 Tab by mouth daily. , Print, Disp-90 Tab, R-3      aspirin delayed-release 81 mg tablet Take 1 Tab by mouth daily. , Print, Disp-30 Tab, R-0       !! - Potential duplicate medications found. Please discuss with provider.         2.   Follow-up Information     Follow up With Specialties Details Why Contact Info    Lorraine Jose MD Internal Medicine Schedule an appointment as soon as possible for a visit   [address]   [address] [facility] [phone]      [facility]  Schedule an appointment as soon as possible for a visit   [address] [place] [address]  [phone]    hospitals EMERGENCY DEPT Emergency Medicine Go to  As needed, If symptoms worsen [address]  [place] [address]  [phone]        3. Return to ED if worse     Diagnosis     Clinical Impression:   1. Right varicocele    2. Right testicular pain    3. Sciatica of right side        Attestations:  I am the first and primary provider of record for this patient's ED encounter. I personally performed the services described above in this documentation. Rivera Moreira MD    Please note that this dictation was completed with Quickoffice, the computer voice recognition software. Quite often unanticipated grammatical, syntax, homophones, and other interpretive errors are inadvertently transcribed by the computer software. Please disregard these errors. Please excuse any errors that have escaped final proofreading. Thank you.

## 2022-04-09 NOTE — ED NOTES
2510: US at bedside. 1035Floria MD Farhan has reviewed discharge instructions with the patient. The patient verbalized understanding. Patient made aware of prescription to be picked up at pharmacy. Patient wheeled out of ED at this time by ED tech. Accompanied home by mother.

## 2022-04-11 ENCOUNTER — APPOINTMENT (OUTPATIENT)
Dept: CARDIAC REHAB | Age: 42
End: 2022-04-11

## 2022-04-11 ENCOUNTER — TELEPHONE (OUTPATIENT)
Dept: ENDOCRINOLOGY | Age: 42
End: 2022-04-11

## 2022-04-11 NOTE — TELEPHONE ENCOUNTER
I called pt and instructed him to take his Lantus as normal, but hold his Novolin R during the clear liquid diet, till after his procedure. Pt voices understanding and agreement with the plan.

## 2022-04-11 NOTE — TELEPHONE ENCOUNTER
4/11/2022  01:06 pm      Pt is having upper and lower gi done on Thursday. Pt wanted to know if  had any request for his insulin for Wednesday. VR#[phone]      Thanks,  Nic Schaeffer

## 2022-04-14 ENCOUNTER — HOSPITAL ENCOUNTER (EMERGENCY)
Age: 42
Discharge: HOME | End: 2022-04-14
Attending: EMERGENCY MEDICINE
Payer: COMMERCIAL

## 2022-04-14 VITALS
HEART RATE: 66 BPM | BODY MASS INDEX: 38.01 KG/M2 | TEMPERATURE: 97.8 F | SYSTOLIC BLOOD PRESSURE: 146 MMHG | DIASTOLIC BLOOD PRESSURE: 67 MMHG | HEIGHT: 69 IN | WEIGHT: 256.62 LBS | RESPIRATION RATE: 11 BRPM | OXYGEN SATURATION: 93 %

## 2022-04-14 DIAGNOSIS — R04.0 EPISTAXIS: Primary | ICD-10-CM

## 2022-04-14 DIAGNOSIS — M54.31 SCIATICA, RIGHT SIDE: ICD-10-CM

## 2022-04-14 DIAGNOSIS — Z79.02 ANTIPLATELET OR ANTITHROMBOTIC LONG-TERM USE: ICD-10-CM

## 2022-04-14 LAB
GLUCOSE BLD STRIP.AUTO-MCNC: 286 MG/DL (ref 65–117)
SERVICE CMNT-IMP: ABNORMAL

## 2022-04-14 PROCEDURE — 74011000250 HC RX REV CODE- 250: Performed by: EMERGENCY MEDICINE

## 2022-04-14 PROCEDURE — 82962 GLUCOSE BLOOD TEST: CPT

## 2022-04-14 PROCEDURE — 74011250637 HC RX REV CODE- 250/637: Performed by: EMERGENCY MEDICINE

## 2022-04-14 PROCEDURE — 99284 EMERGENCY DEPT VISIT MOD MDM: CPT

## 2022-04-14 PROCEDURE — 74011250636 HC RX REV CODE- 250/636: Performed by: EMERGENCY MEDICINE

## 2022-04-14 RX ORDER — TIZANIDINE 4 MG/1
4 TABLET ORAL
Qty: 15 TABLET | Refills: 0 | Status: SHIPPED | OUTPATIENT
Start: 2022-04-14 | End: 2022-04-28

## 2022-04-14 RX ORDER — HYDROCODONE BITARTRATE AND ACETAMINOPHEN 5; 325 MG/1; MG/1
1 TABLET ORAL ONCE
Status: COMPLETED | OUTPATIENT
Start: 2022-04-14 | End: 2022-04-14

## 2022-04-14 RX ORDER — TRANEXAMIC ACID 100 MG/ML
1 INJECTION, SOLUTION INTRAVENOUS ONCE
Status: COMPLETED | OUTPATIENT
Start: 2022-04-14 | End: 2022-04-14

## 2022-04-14 RX ADMIN — HYDROCODONE BITARTRATE AND ACETAMINOPHEN 1 TABLET: 5; 325 TABLET ORAL at 08:22

## 2022-04-14 RX ADMIN — TRANEXAMIC ACID 1000 MG: 1 INJECTION, SOLUTION INTRAVENOUS at 08:22

## 2022-04-14 RX ADMIN — SODIUM CHLORIDE 1000 ML: 9 INJECTION, SOLUTION INTRAVENOUS at 09:12

## 2022-04-14 NOTE — ED NOTES
Dr. Sahra Denise informed of patient having continuous left nostril bleeding. Anterior 4.5 rapid RHINO applied to left nostril by Dr. Melvina Read. Patient tolerated procedure well. RN will monitor patient for any further nose bleeding.

## 2022-04-14 NOTE — DISCHARGE INSTRUCTIONS
It was a pleasure taking care of you in our Emergency Department today. We know that when you come to [facility] you are entrusting us with your health, comfort, and safety. Our physicians and nurses honor that trust, and truly appreciate the opportunity to care for you and your loved ones. We also value your feedback. If you receive a survey about your Emergency Department experience today, please fill it out. We care about our patients' feedback, and we listen to what you have to say. Please read over your discharge instructions as these contain pertinent information to help you in the healing process. These instructions include a list of prescriptions you were given today. Follow-up information is also noted on your discharge papers. There are attached instructions and information pertaining to the reason why you were seen in the emergency department today. These discharge instructions may not be for exactly why you were here, but may be the closest available instructions that we have. These include important advice for things that you can do at home to feel better, and reasons to return to the emergency department. The evaluation and treatment you received in the emergency department is not always definitive care. If follow-up with your primary care doctor or specialist was recommended, it is important that you make these appointments for follow-up care. You may need further testing, procedures, and/or medications to help you feel better. Further tests may be required that are not available in the emergency department. Failure to make these follow-up appointments may jeopardize your health. The emergency department is here for emergent stabilization and evaluation of life and limb threatening illness and/or injuries.   Further care through a specialist or primary care doctor may be required to assist in your healing and complete your treatment and/or evaluation. We may not always be able to make a diagnosis in the emergency department, or things may change that will alter your diagnosis. Our primary goal is to ensure that nothing serious is occurring and that you are stable to continue your treatment and evaluation at home as an outpatient. Of course, if things change, and you feel worse, you are always encouraged to return to the emergency department for re-evaluation. Lab Results Today:  Recent Results (from the past 8 hour(s))   GLUCOSE, POC    Collection Time: 04/14/22  9:14 AM   Result Value Ref Range    Glucose (POC) 286 (H) 65 - 117 mg/dL    Performed by Ayala Beard (BRYAN RN)         Radiology Results Today:  No results found.
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
## 2022-04-14 NOTE — ED NOTES
Patient called to say that he was having nose bleed after the administration of the intra nasal medication. Entered patient's room and noted blood draining from patient's left nostril. Nose clamp placed, pressure applied to stop bleeding.

## 2022-04-14 NOTE — ED PROVIDER NOTES
EMERGENCY DEPARTMENT HISTORY AND PHYSICAL EXAM      Date: 4/14/2022  Patient Name: Jerry Spear    History of Presenting Illness     Chief Complaint   Patient presents with    Epistaxis     pt presents w/ c/o epistaxis x2 hours, pt rpeorts on plavix and 81mg aspirin     Hip Pain     pt also reports right hip pain [id number] has been seen for this complaint       History Provided By: Patient    HPI: Jerry Spear, 36 y.o. male  presents to the ED with cc of epistaxis and right hip pain. Patient states he started having epistaxis from the left naris about 2 hours ago. He is currently on Plavix and aspirin. He states this started out pretty heavy and he had a large clot in the nares. Currently after applying a nose clamp and pressure in the ER it has stopped. He states it was running down the back of his throat a little bit. No nasal steroid or decongestant use. He states he has not been blowing his nose a lot. He also has a complaint of right hip pain. He has been told this is likely from sciatica. He has had work-up here in the emergency department on 2 occasions. He was referred to specialist however is not able to get a quick appointment. He questions what he can do for pain as over-the-counter medicines are currently not helping such as Tylenol and lidocaine patches.     Past History     Past Medical History:  Past Medical History:   Diagnosis Date    Anxiety and depression     Bleeding of eye, left     8/17/21 pt reports receiving injections in right eye for beeding    Chronic headaches 2007    COVID-19 12/20/2020    Diabetes type 1, uncontrolled ([address]     since 8years old    GERD (gastroesophageal reflux disease)     Hypercholesterolemia     Hypertension     Hypothyroidism     MI (myocardial infarction) ([address]     as of 8/17/21 pt reports 8 stents total    WALLY on CPAP        Past Surgical History:  Past Surgical History:   Procedure Laterality Date    HX CARPAL TUNNEL RELEASE Left 2012    HX CARPAL TUNNEL RELEASE Right 2018    CTE trigger thumb and index finger    HX HEART CATHETERIZATION      HX HEENT      HX LAP CHOLECYSTECTOMY  8/26/14    Dr Mackay  EXTRACTION         Medications:  No current facility-administered medications on file prior to encounter. Current Outpatient Medications on File Prior to Encounter   Medication Sig Dispense Refill    methylPREDNISolone (Medrol, Brad,) 4 mg tablet Take by mouth as directed until completion. 1 Dose Pack 0    levothyroxine (SYNTHROID) 112 mcg tablet       rosuvastatin (CRESTOR) 40 mg tablet Take 40 mg by mouth daily.  testosterone (ANDROGEL) 20.25 mg/1.25 gram (1.62 %) gel Apply 2 pump presses daily. Ok to dispense generic testosterone. 1 Each 3    lidocaine (XYLOCAINE) 2 % solution Take 15 mL by mouth as needed for Pain. Gargle and spit out to help with pain 1 Each 0    butalbital-acetaminophen-caffeine (FIORICET, ESGIC) -40 mg per tablet Take 1 Tablet by mouth every six (6) hours as needed for Headache. Indications: a migraine headache 10 Tablet 0    famotidine (PEPCID) 40 mg tablet Take 1 Tablet by mouth daily. 30 Tablet 3    busPIRone (BUSPAR) 30 mg tablet Take 1 Tablet by mouth daily. (Patient taking differently: Take 15 mg by mouth two (2) times a day.) 90 Tablet 1    insulin glargine (Lantus U-100 Insulin) 100 unit/mL injection INJECT 90 UNITS SUBCUTANEOUSLY IN THE MORNING AND 90 UNITS AT NIGHT 60 mL 5    [DISCONTINUED] rosuvastatin (CRESTOR) 20 mg tablet Take 20 mg by mouth nightly. (Patient not taking: Reported on 2/18/2022)      sertraline (ZOLOFT) 100 mg tablet Take 1 tablet by mouth once daily 30 Tablet 4    clopidogreL (PLAVIX) 75 mg tab Take 1 Tablet by mouth daily. 30 Tablet 12    lisinopriL (PRINIVIL, ZESTRIL) 20 mg tablet Take 1 Tablet by mouth daily.  (Patient taking differently: Take 10 mg by mouth daily.) 30 Tablet 12    metFORMIN (GLUCOPHAGE) 500 mg tablet Take 1 Tablet by mouth two (2) times daily (with meals). 180 Tablet 3    insulin regular (HumuLIN R Regular U-100 Insuln) 100 unit/mL injection 30 units with breakfast,30 units with lunch and  30 units with dinner plus correction. 160 units per day max (Patient taking differently: 10-30 units with breakfast, 10-30 units with lunch and  10-30 units with dinner plus correction. 160 units per day max) 50 mL 3    [DISCONTINUED] flash glucose sensor (FreeStyle Mikal 2 Sensor) kit Change sensor every 14 days (Patient not taking: Reported on 2/18/2022) 2 Kit 3    [DISCONTINUED] flash glucose scanning reader (FreeStyle Mikal 2 [place]) misc Change sensor every 14 days (Patient not taking: Reported on 2/18/2022) 1 Each 0    cholecalciferol (Vitamin D3) (5000 Units/125 mcg) tab tablet Take 1 Tab by mouth daily. 90 Tab 1    Insulin Syringe-Needle U-100 (BD Insulin Syringe) 1 mL 25 x 1\" syrg Use for drawing up/injecting testosterone once weekly. 100 Each 3    [DISCONTINUED] Syringe with Needle, Disp, 1 mL 25 gauge x 5/8\" syrg Use with testosterone once every week (Patient not taking: Reported on 2/18/2022) 50 Syringe 3    aluminum & magnesium hydroxide-simethicone (Maalox Maximum Strength) 400-400-40 mg/5 mL suspension Take 10 mL by mouth every six (6) hours as needed for Indigestion.  nitroglycerin (NITROSTAT) 0.4 mg SL tablet Take 1 Tab by mouth every five (5) minutes as needed for Chest Pain. Sit down then put one tab under the tongue every 5 minutes as needed 1 Bottle 0    metoprolol succinate (TOPROL-XL) 25 mg XL tablet Take 1 Tab by mouth daily. (Patient taking differently: Take 25 mg by mouth two (2) times a day.) 90 Tab 3    aspirin delayed-release 81 mg tablet Take 1 Tab by mouth daily.  27 Tab 0       Family History:  Family History   Problem Relation Age of Onset    Diabetes Mother     Hypertension Mother     Heart Disease Father     Cancer Father     Diabetes Father     Diabetes Paternal Aunt     Diabetes Maternal Grandmother     Thyroid Cancer Maternal Grandmother     Kidney Disease Maternal Grandmother     Arthritis Maternal Grandmother     Heart Disease Maternal Grandfather     High Cholesterol Maternal Grandfather     Hypertension Maternal Grandfather     Diabetes Paternal Grandmother     Hemophilia Paternal Grandfather        Social History:  Social History     Tobacco Use    Smoking status: Former Smoker     Packs/day: 0.00     Years: 14.00     Pack years: 0.00     Quit date: 2014     Years since quittin.2    Smokeless tobacco: Never Used   Vaping Use    Vaping Use: Never used   Substance Use Topics    Alcohol use: No    Drug use: No       Allergies: Allergies   Allergen Reactions    Morphine Nausea and Vomiting    Penicillin G Swelling    Percocet [Oxycodone-Acetaminophen] Hives and Nausea and Vomiting     Pt is allergic to Oxycodone, has previously tolerated Tylenol and Hydrocodone.  Sulfa (Sulfonamide Antibiotics) Swelling    Tramadol Nausea Only     Nausea and headache         All the above components of the past  history are auto-populated from the electronic record. They have been reviewed and the patient has been interviewed for any pertinent past history that pertains to the patient's chief complaint and reason for visit. Not all pre-populated components may be accurate at the time this note was generated. Review of Systems   Review of Systems   Constitutional: Negative for chills and fever. HENT: Positive for nosebleeds. Negative for congestion, ear pain, rhinorrhea, sore throat and trouble swallowing. Eyes: Negative for visual disturbance. Respiratory: Negative for cough, chest tightness and shortness of breath. Cardiovascular: Negative for chest pain and palpitations. Gastrointestinal: Negative for abdominal pain, blood in stool, constipation, diarrhea, nausea and vomiting.    Genitourinary: Negative for decreased urine volume, difficulty urinating, dysuria and frequency. Musculoskeletal: Positive for arthralgias. Negative for back pain and neck pain. Right hip pain   Skin: Negative for color change and rash. Neurological: Negative for dizziness, weakness, light-headedness and headaches. Physical Exam   Physical Exam  Vitals and nursing note reviewed. Constitutional:       General: He is not in acute distress. Appearance: He is well-developed. He is not ill-appearing. HENT:      Head: Normocephalic. Nose:      Right Nostril: No epistaxis. Left Nostril: Epistaxis present. Comments: Potential site for bleeding on the nasal septum with no active bleeding  Eyes:      Conjunctiva/sclera: Conjunctivae normal.   Cardiovascular:      Rate and Rhythm: Normal rate and regular rhythm. Pulmonary:      Effort: Pulmonary effort is normal. No accessory muscle usage or respiratory distress. Abdominal:      General: There is no distension. Musculoskeletal:      Cervical back: Normal range of motion. Skin:     General: Skin is warm and dry. Neurological:      Mental Status: He is alert and oriented to person, place, and time. Diagnostic Study Results     Labs -     Recent Results (from the past 24 hour(s))   GLUCOSE, POC    Collection Time: 04/14/22  9:14 AM   Result Value Ref Range    Glucose (POC) 286 (H) 65 - 117 mg/dL    Performed by Danie Spear (BRYAN RN)        Radiologic Studies -   No orders to display     CT Results  (Last 48 hours)    None        CXR Results  (Last 48 hours)    None            Medical Decision Making     I reviewed the vital signs, available nursing notes, past medical history, past surgical history, family history and social history. Vital Signs-I have reviewed the vital signs that have been made available during the patient's emergency department visit.   The vital signs auto-populated below are obtained mostly by electronic means through monitoring devices that have been downloaded into the patient's chart by the nursing staff. Some vital signs are not downloaded into the chart until after the patient has been discharged and this note has been completed, therefore some vital signs may not be available to the physician for review prior to patient's discharge or admission. The physician has reviewed the patient's triage vital signs, monitored the electronic monitoring devices remotely for any significant vital sign abnormalities, and have reviewed vital signs prior to discharge. Some vital signs reviewed at bedside or remotely utilizing electronic monitoring devices may be different than the vital signs downloaded into the electronic medical record. Some vital signs may be erroneous and inaccurate since they are obtained by electronic monitoring devices, and not all vital signs are verified for accuracy by nursing staff prior to downloading into the patient's chart. Patient Vitals for the past 24 hrs:   Temp Pulse Resp BP SpO2   04/14/22 0954 -- 66 11 (!) 146/67 93 %   04/14/22 0922 -- 62 12 119/67 94 %   04/14/22 0907 -- -- -- (!) 96/55 100 %   04/14/22 0757 -- -- -- 139/73 98 %   04/14/22 0735 97.8 °F (36.6 °C) 70 20 137/78 98 %   04/14/22 0704 97.8 °F (36.6 °C) 69 -- -- 100 %         Records Reviewed: Nursing notes for today's visit have been reviewed. I have also reviewed most recent medical records pertinent to today's complaints, if available in our medical record system. I have also reviewed all labs and imaging results from previous results in comparison to results obtained today. If an EKG was obtained today, it has been compared to previous EKGs, if available. If arriving via EMS, the EMS report has been reviewed if made available to us within the patient's time in the emergency department. Provider Notes (Medical Decision Making):   Patient who is on antiplatelet therapy presents with epistaxis from the left naris. Source appears to be anterior.   Patient was able to stop the bleeding with nose clamp on arrival to the emergency department. Initially TXA was applied intranasally. He was monitored and unfortunately had return of hemorrhage from the left naris. A nasal tampon device was then placed to control the bleeding which worked successfully. He did have a vagal response to the tampon device with the balloon being inflated so this was deflated and the patient was given an IV fluid bolus. He had not eaten or drank anything this morning and having given a hydrocodone so this all exacerbated his symptoms. Will refer to ENT. Advised to call this afternoon to see before the weekend. Patient instructed to return to the ER if he has worsening bleeding. Patient also has sciatica on the right side in which she has been seen twice in the ER. Patient currently is not having relief with over-the-counter medicines. He was given hydrocodone 6 days ago for 3-day course. I will start him on tizanidine to see if this helps with his symptoms and would recommend continuing the Tylenol and lidocaine patches. ED Course:   Initial assessment performed. The patients presenting problems have been discussed, and they are in agreement with the care plan formulated and outlined with them. I have encouraged them to ask questions as they arise throughout their visit.          Orders Placed This Encounter    EPISTAXIS MANAGEMENT    GLUCOSE, POC    SALINE LOCK IV ONE TIME STAT    tranexamic acid (CYKLOKAPRON) injection 1,000 mg    HYDROcodone-acetaminophen (NORCO) 5-325 mg per tablet 1 Tablet    sodium chloride 0.9 % bolus infusion 1,000 mL    tiZANidine (ZANAFLEX) 4 mg tablet       Epistaxis Management    Date/Time: 4/14/2022 9:00 AM  Performed by: Celia Lange MD  Authorized by: Celia Lange MD     Procedure details:     Treatment site:  L anterior    Treatment method:  Nasal tampon    Treatment complexity:  Limited    Treatment episode: initial    Post-procedure details:     Assessment: Bleeding stopped    Patient tolerance of procedure: Tolerated well, no immediate complications  Comments:      Vagal response about 10 minutes after placement. Balloon deflated. Critical Care Time:   0    Disposition:  Discharge    The patient's emergency department evaluation is now complete. I have reviewed all labs, imaging, and pertinent information. I have discussed all results with the patient and/or family. Based on our evaluation today I do believe that the patient is safe to be discharged home. The patient has been provided with at home instructions that are pertinent to their complaint today, although these may not be specific to the exact diagnosis. I have reviewed the patient's home medications and attempted to reconcile if not already done so by pharmacy or nursing staff. I have discussed all new prescriptions with the patient. The patient has been encouraged to follow-up with primary care doctor and/or specialist, and these have been discussed with the patient. The patient has been advised that they may return to the emergency department if they have any worsening symptoms and or new symptoms that are of concern to them. Verbal discharge instructions may have also been provided to the patient that may not be specifically contained in the written discharge instructions. The patient has been given opportunity to ask questions prior to discharge. PLAN:  1. Current Discharge Medication List      START taking these medications    Details   tiZANidine (ZANAFLEX) 4 mg tablet Take 1 Tablet by mouth three (3) times daily as needed for Muscle Spasm(s) or Pain. Qty: 15 Tablet, Refills: 0  Start date: 4/14/2022         CONTINUE these medications which have NOT CHANGED    Details   rosuvastatin (CRESTOR) 40 mg tablet Take 40 mg by mouth daily.            2.   Follow-up Information     Follow up With Specialties Details Why Contact Info    Kenyn Paz MD Internal Medicine Schedule an appointment as soon as possible for a visit  As needed [address]  [address]  [address] [facility]      Ida Pollock MD Otolaryngology Schedule an appointment as soon as possible for a visit  nosebleeds [address]  [address]  [address] [phone]          Return to ED if worse     Diagnosis     Clinical Impression:   1. Epistaxis    2. Sciatica, right side    3.  Antiplatelet or antithrombotic long-term use

## 2022-04-14 NOTE — ED NOTES
After patient had RHINO placed in left nostril, he called to say that he was not \"feeling good, I feel I am going to pass out, and I am sweating, I can't breath\". Patient was assessed and noted to be slightly diaphoretic, skin moist, blood pressure dropped to 96/56. Dr. Corinne Sahu was called and came to patient bedside. New orders received for IV access and bolus of normal saline. Patient placed on cardiac monitor. Dr. Lizy Cloud decreased the amount of pressure in RHINO tube.

## 2022-04-14 NOTE — ED NOTES
Reviewed discharge instructions with patient and mother. Patient verbalized understanding of instructions, Patient taken out to private vehicle in wheelchair in a stable condition accompanied by mom.

## 2022-04-26 LAB
HBA1C MFR BLD HPLC: 10.1 %
LDL-C, EXTERNAL: 60

## 2022-04-28 ENCOUNTER — OFFICE VISIT (OUTPATIENT)
Dept: BEHAVIORAL/MENTAL HEALTH CLINIC | Age: 42
End: 2022-04-28
Payer: COMMERCIAL

## 2022-04-28 VITALS
TEMPERATURE: 97 F | RESPIRATION RATE: 16 BRPM | DIASTOLIC BLOOD PRESSURE: 76 MMHG | WEIGHT: 258.8 LBS | BODY MASS INDEX: 38.33 KG/M2 | HEIGHT: 69 IN | SYSTOLIC BLOOD PRESSURE: 129 MMHG | HEART RATE: 72 BPM | OXYGEN SATURATION: 98 %

## 2022-04-28 DIAGNOSIS — F43.10 PTSD (POST-TRAUMATIC STRESS DISORDER): ICD-10-CM

## 2022-04-28 DIAGNOSIS — F41.1 GAD (GENERALIZED ANXIETY DISORDER): Primary | ICD-10-CM

## 2022-04-28 DIAGNOSIS — G47.00 INSOMNIA DISORDER WITH NON-SLEEP DISORDER MENTAL COMORBIDITY: ICD-10-CM

## 2022-04-28 PROCEDURE — 99214 OFFICE O/P EST MOD 30 MIN: CPT | Performed by: NURSE PRACTITIONER

## 2022-04-28 RX ORDER — LORAZEPAM 1 MG/1
1 TABLET ORAL
Qty: 45 TABLET | Refills: 0 | Status: SHIPPED | OUTPATIENT
Start: 2022-04-28 | End: 2022-05-26 | Stop reason: SDUPTHER

## 2022-04-28 RX ORDER — PROPRANOLOL HYDROCHLORIDE 10 MG/1
10 TABLET ORAL
Qty: 90 TABLET | Refills: 0 | Status: SHIPPED | OUTPATIENT
Start: 2022-04-28 | End: 2022-05-26

## 2022-04-28 RX ORDER — SERTRALINE HYDROCHLORIDE 25 MG/1
25 TABLET, FILM COATED ORAL DAILY
Qty: 30 TABLET | Refills: 0 | Status: SHIPPED | OUTPATIENT
Start: 2022-04-28 | End: 2022-05-23

## 2022-04-28 RX ORDER — SERTRALINE HYDROCHLORIDE 100 MG/1
100 TABLET, FILM COATED ORAL DAILY
Qty: 30 TABLET | Refills: 1 | Status: SHIPPED | OUTPATIENT
Start: 2022-04-28 | End: 2022-05-26 | Stop reason: SDUPTHER

## 2022-04-28 NOTE — PROGRESS NOTES
Chief Complaint   Patient presents with    New Patient     Former [facility] patient     Visit Vitals  /76 (BP 1 Location: Left upper arm, BP Patient Position: Sitting, BP Cuff Size: Large adult)   Pulse 72   Temp 97 °F (36.1 °C) (Temporal)   Resp 16   Ht 5' 9\" (1.753 m)   Wt 117.4 kg (258 lb 12.8 oz)   SpO2 98%   BMI 38.22 kg/m²     Prior to Admission medications    Medication Sig Start Date End Date Taking? Authorizing Provider   sertraline (ZOLOFT) 100 mg tablet Take 1 tablet by mouth once daily 4/27/22  Yes Umair Salgado MD   levothyroxine (SYNTHROID) 112 mcg tablet  2/15/22  Yes Provider, Historical   rosuvastatin (CRESTOR) 40 mg tablet Take 40 mg by mouth daily. 2/16/22  Yes Provider, Historical   testosterone (ANDROGEL) 20.25 mg/1.25 gram (1.62 %) gel Apply 2 pump presses daily. Ok to dispense generic testosterone. 2/18/22  Yes Tia Arrieta MD   lidocaine (XYLOCAINE) 2 % solution Take 15 mL by mouth as needed for Pain. Gargle and spit out to help with pain 2/1/22  Yes Justin Vivar PA   butalbital-acetaminophen-caffeine (FIORICET, ESGIC) -40 mg per tablet Take 1 Tablet by mouth every six (6) hours as needed for Headache. Indications: a migraine headache 1/28/22  Yes Ray Randle MD   famotidine (PEPCID) 40 mg tablet Take 1 Tablet by mouth daily. 1/19/22  Yes Umair Salgado MD   busPIRone (BUSPAR) 30 mg tablet Take 1 Tablet by mouth daily. Patient taking differently: Take 15 mg by mouth two (2) times a day. 1/14/22  Yes Umair Salgado MD   insulin glargine (Lantus U-100 Insulin) 100 unit/mL injection INJECT 90 UNITS SUBCUTANEOUSLY IN THE MORNING AND 90 UNITS AT NIGHT 12/20/21  Yes Tia Arrieta MD   clopidogreL (PLAVIX) 75 mg tab Take 1 Tablet by mouth daily. 10/29/21  Yes Roya Joseph MD   lisinopriL (PRINIVIL, ZESTRIL) 20 mg tablet Take 1 Tablet by mouth daily. Patient taking differently: Take 10 mg by mouth daily.  10/29/21  Yes Roya Joseph MD   metFORMIN (GLUCOPHAGE) 500 mg tablet Take 1 Tablet by mouth two (2) times daily (with meals). 10/1/21  Yes Evelyn Medina MD   insulin regular (HumuLIN R Regular U-100 Insuln) 100 unit/mL injection 30 units with breakfast,30 units with lunch and  30 units with dinner plus correction. 160 units per day max  Patient taking differently: 10-30 units with breakfast, 10-30 units with lunch and  10-30 units with dinner plus correction. 160 units per day max 9/29/21  Yes Evelyn Medina MD   cholecalciferol (Vitamin D3) (5000 Units/125 mcg) tab tablet Take 1 Tab by mouth daily. 3/10/21  Yes Maurice Carr MD   Insulin Syringe-Needle U-100 (BD Insulin Syringe) 1 mL 25 x 1\" syrg Use for drawing up/injecting testosterone once weekly. 12/7/20  Yes Evelyn Medina MD   aluminum & magnesium hydroxide-simethicone (Maalox Maximum Strength) 400-400-40 mg/5 mL suspension Take 10 mL by mouth every six (6) hours as needed for Indigestion. Yes Provider, Historical   nitroglycerin (NITROSTAT) 0.4 mg SL tablet Take 1 Tab by mouth every five (5) minutes as needed for Chest Pain. Sit down then put one tab under the tongue every 5 minutes as needed 12/4/19  Yes Sherren Becket, MD   metoprolol succinate (TOPROL-XL) 25 mg XL tablet Take 1 Tab by mouth daily. Patient taking differently: Take 25 mg by mouth two (2) times a day. 12/3/19  Yes Angie Garcia MD   aspirin delayed-release 81 mg tablet Take 1 Tab by mouth daily. 9/24/19  Yes Regine Verduzco MD   tiZANidine (ZANAFLEX) 4 mg tablet Take 1 Tablet by mouth three (3) times daily as needed for Muscle Spasm(s) or Pain. Patient not taking: Reported on 4/28/2022 4/14/22   Brian LOPEZ MD   methylPREDNISolone (Medrol, Brad,) 4 mg tablet Take by mouth as directed until completion.  4/9/22   Amber Pruett MD

## 2022-04-28 NOTE — PROGRESS NOTES
CHIEF COMPLAINT:  Marquetta Olszewski is a 36 y.o. male and was seen today to establish psychiatric care. HPI:    Rafael Charles reports the following psychiatric symptoms:  depression and anxiety. The symptoms have been present for years and are of moderate severity. The symptoms occur constantly. Associated symptoms include  agitation, anger outbursts, anxiety attacks, fear of impending doom and relationship difficulties. Rafael Charles is a transfer patient within the [address] [place] [address] system last seen Gary Plascencia two years ago. He has a past psychiatric hx of Adjustment Disorder, Insomnia, and anxiety. He has past medical hx of MI, DMII, CAD. Pt believes exacerbation in anxiety started several months ago when he starting having GI upset and chest pain. Patient reports being afraid of having another MI. He reports having daily panic attacks, night cantu, fear of enclosed spaces, social anxiety and general worry. He reports sleep disturbance due to chronic pain. He is currently taking Zoloft 100 mg daily and  Buspar 15 mg BID. There are no other precipitating or alleviating factors. Denies SI/HI/AH/VH and delusions.      PAST HISTORY:  Psychiatric:  Past Psychiatric Hospitalization: denies   Past Outpatient Providers:  Chelle   Past Psychiatric Medications: Duloxetine, Inderal, Hydroxyzine, Ambien, Ativan     Medical:  Active Ambulatory Problems     Diagnosis Date Noted    DM (diabetes mellitus) ([facility] 08/23/2014    Acquired hypothyroidism 11/28/2016    Essential hypertension 11/28/2016    Fibromyalgia 11/28/2016    Microalbuminuria 11/28/2016    Anxiety 11/28/2016    Migraine without aura 11/28/2016    Hypertriglyceridemia 12/05/2016    Severe obesity ([address] 05/15/2019    Transient ischemic attack involving left internal carotid artery 07/15/2019    Chest pain 11/28/2019    Unstable angina ([address] 11/28/2019    Coronary artery disease involving native coronary artery of native heart with unstable angina pectoris ([address] 2019    Type 2 diabetes with nephropathy ([facility] 2020    Dysthymia 2020    Adjustment disorder with mixed emotional features 2020    Anxiety disorder due to general medical condition with panic attack 2020    Insomnia disorder with non-sleep disorder mental comorbidity 2020    Dizzy spells 2021    [facility] lacunar infarcts ([facility] 2021    Cervical spondylosis with radiculopathy 2021    Low back pain 2022    Corneal abrasion, right 2022    Stable proliferative diabetic retinopathy of both eyes associated with type 1 diabetes mellitus ([facility] 2022     Resolved Ambulatory Problems     Diagnosis Date Noted    Acute pancreatitis 2014     Past Medical History:   Diagnosis Date    Anxiety and depression     Bleeding of eye, left     Chronic headaches     COVID-19 2020    Diabetes type 1, uncontrolled ([facility]     GERD (gastroesophageal reflux disease)     Hypercholesterolemia     Hypertension     Hypothyroidism     MI (myocardial infarction) ([facility]     WALLY on CPAP      Substance Use:   Social History     Socioeconomic History    Marital status:    Tobacco Use    Smoking status: Former Smoker     Packs/day: 0.00     Years: 14.00     Pack years: 0.00     Quit date: 2014     Years since quittin.3    Smokeless tobacco: Never Used   Vaping Use    Vaping Use: Never used   Substance and Sexual Activity    Alcohol use: No    Drug use: No    Sexual activity: Not Currently   Other Topics Concern     Service No    Blood Transfusions No    Caffeine Concern No    Occupational Exposure No    Hobby Hazards No    Sleep Concern Yes    Stress Concern No    Weight Concern Yes    Special Diet Yes    Back Care Yes    Exercise No    Bike Helmet No    Seat Belt Yes    Self-Exams No     Social:  Marital Status: , currently has a girl friend   Children: Son 16year old Norma Reyes.    Educational Level: High School, Electrical Apprenticeship   Work History: Disabled- was working with [employer] before MI in 2019  Legal History: denies   Pertinent Childhood History: Denies   Grew up in [place] with both parents     Family:  Family history of mental or substance use history reported. Family history of medical problems reviewed. Family History   Problem Relation Age of Onset    Diabetes Mother     Hypertension Mother     Heart Disease Father     Cancer Father     Diabetes Father     Diabetes Paternal Aunt     Diabetes Maternal Grandmother     Thyroid Cancer Maternal Grandmother     Kidney Disease Maternal Grandmother     Arthritis Maternal Grandmother     Heart Disease Maternal Grandfather     High Cholesterol Maternal Grandfather     Hypertension Maternal Grandfather     Diabetes Paternal Grandmother     Hemophilia Paternal Grandfather         MEDICATIONS:  Current Outpatient Medications   Medication Sig Dispense Refill    sertraline (ZOLOFT) 100 mg tablet Take 1 Tablet by mouth daily. 30 Tablet 1    sertraline (ZOLOFT) 25 mg tablet Take 1 Tablet by mouth daily. 30 Tablet 0    LORazepam (ATIVAN) 1 mg tablet Take 1 Tablet by mouth two (2) times daily as needed for Anxiety. Max Daily Amount: 2 mg. 45 Tablet 0    propranoloL (INDERAL) 10 mg tablet Take 1 Tablet by mouth three (3) times daily as needed for Anxiety. 90 Tablet 0    levothyroxine (SYNTHROID) 112 mcg tablet       rosuvastatin (CRESTOR) 40 mg tablet Take 40 mg by mouth daily.  testosterone (ANDROGEL) 20.25 mg/1.25 gram (1.62 %) gel Apply 2 pump presses daily. Ok to dispense generic testosterone. 1 Each 3    lidocaine (XYLOCAINE) 2 % solution Take 15 mL by mouth as needed for Pain. Gargle and spit out to help with pain 1 Each 0    butalbital-acetaminophen-caffeine (FIORICET, ESGIC) -40 mg per tablet Take 1 Tablet by mouth every six (6) hours as needed for Headache.  Indications: a migraine headache 10 Tablet 0  famotidine (PEPCID) 40 mg tablet Take 1 Tablet by mouth daily. 30 Tablet 3    busPIRone (BUSPAR) 30 mg tablet Take 1 Tablet by mouth daily. (Patient taking differently: Take 15 mg by mouth two (2) times a day.) 90 Tablet 1    insulin glargine (Lantus U-100 Insulin) 100 unit/mL injection INJECT 90 UNITS SUBCUTANEOUSLY IN THE MORNING AND 90 UNITS AT NIGHT 60 mL 5    clopidogreL (PLAVIX) 75 mg tab Take 1 Tablet by mouth daily. 30 Tablet 12    metFORMIN (GLUCOPHAGE) 500 mg tablet Take 1 Tablet by mouth two (2) times daily (with meals). 180 Tablet 3    cholecalciferol (Vitamin D3) (5000 Units/125 mcg) tab tablet Take 1 Tab by mouth daily. 90 Tab 1    Insulin Syringe-Needle U-100 (BD Insulin Syringe) 1 mL 25 x 1\" syrg Use for drawing up/injecting testosterone once weekly. 100 Each 3    aluminum & magnesium hydroxide-simethicone (Maalox Maximum Strength) 400-400-40 mg/5 mL suspension Take 10 mL by mouth every six (6) hours as needed for Indigestion.  nitroglycerin (NITROSTAT) 0.4 mg SL tablet Take 1 Tab by mouth every five (5) minutes as needed for Chest Pain. Sit down then put one tab under the tongue every 5 minutes as needed 1 Bottle 0    aspirin delayed-release 81 mg tablet Take 1 Tab by mouth daily. 30 Tab 0       ALLERGIES:  Allergies   Allergen Reactions    Morphine Nausea and Vomiting    Penicillin G Swelling    Percocet [Oxycodone-Acetaminophen] Hives and Nausea and Vomiting     Pt is allergic to Oxycodone, has previously tolerated Tylenol and Hydrocodone.     Sulfa (Sulfonamide Antibiotics) Swelling    Tramadol Nausea Only     Nausea and headache         REVIEW OF SYSTEMS:  Psychiatric:  depression, anxiety   Appetite:\"comes and goes\"    Sleep: decreased more than normal, average about 6 hours, but due to pain patient is unable to sleep much  Neuro: denies   Cardio: Hx of MI in 2019  Pt reports the following:  DMII, CAD, Chronic pain   All other systems reviewed and are considered negative. MENTAL STATUS EXAM:     Orientation oriented to time, place and person   Vital Signs (BP,Pulse, Temp) See below (reviewed)   Gait and Station Within normal limits   Abnormal Muscular Movements/Tone/Behavior No EPS, no Tardive Dyskinesia, no abnormal muscular movements; wnl tone   Relations cooperative   General Appearance:  age appropriate and within normal Limits   Language No aphasia or dysarthria   Speech:  normal pitch and normal volume   Thought Processes logical, wnl rate of thoughts, good abstract reasoning and computation   Thought Associations within normal limits   Thought Content free of delusions and free of hallucinations   Suicidal Ideations none   Homicidal Ideations none   Mood:  anxious and depressed   Affect:  depressed   Memory recent  adequate   Memory remote:  adequate   Concentration/Attention:  adequate   Fund of Knowledge average   Insight:  good   Reliability good   Judgment:  good     VITALS:     Visit Vitals  /76 (BP 1 Location: Left upper arm, BP Patient Position: Sitting, BP Cuff Size: Large adult)   Pulse 72   Temp 97 °F (36.1 °C) (Temporal)   Resp 16   Ht 5' 9\" (1.753 m)   Wt 117.4 kg (258 lb 12.8 oz)   SpO2 98%   BMI 38.22 kg/m²       PERTINENT DATA:  No visits with results within 2 Day(s) from this visit. Latest known visit with results is:   Admission on 04/14/2022, Discharged on 04/14/2022   Component Date Value Ref Range Status    Glucose (POC) 04/14/2022 286* 65 - 117 mg/dL Final    Performed by 04/14/2022 Shena Mariano (BRYAN RN)   Final       XR Results (most recent):  Results from Hospital Encounter encounter on 01/13/22    XR CHEST PA LAT    Narrative  EXAM: XR CHEST PA LAT    HISTORY: Pneumonia. COMPARISON: CT 12/15/2021    FINDINGS: 2 view(s) of the chest. The lungs are well inflated. No focal  consolidation, pleural effusion, or pneumothorax. The cardiomediastinal  silhouette is unremarkable.  The visualized osseous structures are unremarkable. Impression  No acute cardiopulmonary process. MEDICAL DECISION MAKING:  Problems addressed today:     ICD-10-CM ICD-9-CM    1. CESAR (generalized anxiety disorder)  F41.1 300.02 sertraline (ZOLOFT) 100 mg tablet      sertraline (ZOLOFT) 25 mg tablet      LORazepam (ATIVAN) 1 mg tablet      propranoloL (INDERAL) 10 mg tablet   2. Insomnia disorder with non-sleep disorder mental comorbidity  G47.00 780.52    3. PTSD (post-traumatic stress disorder)  F43.10 309.81            Assessment:   Alberto Gary is a 36 y.o. male and presents to outpatient face to face apt to establish care with this provider. He is alert and oriented, cooperative, dressed appropriately for season, and has good eye contact. Discussed current medications and treatment plan, discussed alternative treatment plan, medications, dosage, risks vs benefits, benzo safety, and side effects. Patient educated on diagnosis, encouraged to start psychotherapy.  reviewed, benzo agreement signed. Will add Zoloft 25 mg, start Propranolol 10 mg TID for anxiety, and start Ativan  1 mg BID as needed for severe anxiety and panic attacks. Patient informed of benzo policy. He denies SI/HI/AH/VH and delusions. Plan:   1. Medication:      Current Outpatient Medications   Medication Sig Dispense Refill    sertraline (ZOLOFT) 100 mg tablet Take 1 Tablet by mouth daily. 30 Tablet 1    sertraline (ZOLOFT) 25 mg tablet Take 1 Tablet by mouth daily. 30 Tablet 0    LORazepam (ATIVAN) 1 mg tablet Take 1 Tablet by mouth two (2) times daily as needed for Anxiety. Max Daily Amount: 2 mg. 45 Tablet 0    propranoloL (INDERAL) 10 mg tablet Take 1 Tablet by mouth three (3) times daily as needed for Anxiety. 90 Tablet 0    levothyroxine (SYNTHROID) 112 mcg tablet       rosuvastatin (CRESTOR) 40 mg tablet Take 40 mg by mouth daily.  testosterone (ANDROGEL) 20.25 mg/1.25 gram (1.62 %) gel Apply 2 pump presses daily.  Ok to dispense generic testosterone. 1 Each 3    lidocaine (XYLOCAINE) 2 % solution Take 15 mL by mouth as needed for Pain. Gargle and spit out to help with pain 1 Each 0    butalbital-acetaminophen-caffeine (FIORICET, ESGIC) -40 mg per tablet Take 1 Tablet by mouth every six (6) hours as needed for Headache. Indications: a migraine headache 10 Tablet 0    famotidine (PEPCID) 40 mg tablet Take 1 Tablet by mouth daily. 30 Tablet 3    busPIRone (BUSPAR) 30 mg tablet Take 1 Tablet by mouth daily. (Patient taking differently: Take 15 mg by mouth two (2) times a day.) 90 Tablet 1    insulin glargine (Lantus U-100 Insulin) 100 unit/mL injection INJECT 90 UNITS SUBCUTANEOUSLY IN THE MORNING AND 90 UNITS AT NIGHT 60 mL 5    clopidogreL (PLAVIX) 75 mg tab Take 1 Tablet by mouth daily. 30 Tablet 12    metFORMIN (GLUCOPHAGE) 500 mg tablet Take 1 Tablet by mouth two (2) times daily (with meals). 180 Tablet 3    cholecalciferol (Vitamin D3) (5000 Units/125 mcg) tab tablet Take 1 Tab by mouth daily. 90 Tab 1    Insulin Syringe-Needle U-100 (BD Insulin Syringe) 1 mL 25 x 1\" syrg Use for drawing up/injecting testosterone once weekly. 100 Each 3    aluminum & magnesium hydroxide-simethicone (Maalox Maximum Strength) 400-400-40 mg/5 mL suspension Take 10 mL by mouth every six (6) hours as needed for Indigestion.  nitroglycerin (NITROSTAT) 0.4 mg SL tablet Take 1 Tab by mouth every five (5) minutes as needed for Chest Pain. Sit down then put one tab under the tongue every 5 minutes as needed 1 Bottle 0    aspirin delayed-release 81 mg tablet Take 1 Tab by mouth daily. 30 Tab 0         Medication changes made today: Zoloft 25 mg po daily, cont Zoloft 100 mg po daily, start Propranolol 10 mg TID as needed for anxiety. 2. Counseling and coordination of care including instructions for treatment, risks/benefits, risk factor reduction and patient/family education. He agrees with the plan.  Patient instructed to call with any side effects, questions or issues. 3. Collateral information  4. Individual therapy   5. Monitor VS and appropriate labs  6. Request records     Follow-up and Dispositions    · Return in about 4 weeks (around 5/26/2022) for Med/management .               4/28/2022  Bunny Albert NP

## 2022-05-02 ENCOUNTER — TELEPHONE (OUTPATIENT)
Dept: BEHAVIORAL/MENTAL HEALTH CLINIC | Age: 42
End: 2022-05-02

## 2022-05-02 ENCOUNTER — OFFICE VISIT (OUTPATIENT)
Dept: INTERNAL MEDICINE CLINIC | Age: 42
End: 2022-05-02
Payer: COMMERCIAL

## 2022-05-02 VITALS
WEIGHT: 256 LBS | SYSTOLIC BLOOD PRESSURE: 129 MMHG | HEIGHT: 69 IN | BODY MASS INDEX: 37.92 KG/M2 | HEART RATE: 72 BPM | TEMPERATURE: 98 F | OXYGEN SATURATION: 98 % | DIASTOLIC BLOOD PRESSURE: 78 MMHG | RESPIRATION RATE: 18 BRPM

## 2022-05-02 DIAGNOSIS — F41.0 ANXIETY DISORDER DUE TO GENERAL MEDICAL CONDITION WITH PANIC ATTACK: ICD-10-CM

## 2022-05-02 DIAGNOSIS — F06.4 ANXIETY DISORDER DUE TO GENERAL MEDICAL CONDITION WITH PANIC ATTACK: ICD-10-CM

## 2022-05-02 DIAGNOSIS — R42 DIZZY SPELLS: ICD-10-CM

## 2022-05-02 DIAGNOSIS — I25.110 CORONARY ARTERY DISEASE INVOLVING NATIVE CORONARY ARTERY OF NATIVE HEART WITH UNSTABLE ANGINA PECTORIS (HCC): ICD-10-CM

## 2022-05-02 DIAGNOSIS — G47.00 INSOMNIA DISORDER WITH NON-SLEEP DISORDER MENTAL COMORBIDITY: ICD-10-CM

## 2022-05-02 DIAGNOSIS — E78.1 HYPERTRIGLYCERIDEMIA: ICD-10-CM

## 2022-05-02 DIAGNOSIS — E11.21 TYPE 2 DIABETES WITH NEPHROPATHY (HCC): Primary | ICD-10-CM

## 2022-05-02 DIAGNOSIS — M47.22 CERVICAL SPONDYLOSIS WITH RADICULOPATHY: ICD-10-CM

## 2022-05-02 DIAGNOSIS — I63.81 MULTIPLE LACUNAR INFARCTS (HCC): ICD-10-CM

## 2022-05-02 DIAGNOSIS — F43.29 ADJUSTMENT DISORDER WITH MIXED EMOTIONAL FEATURES: ICD-10-CM

## 2022-05-02 DIAGNOSIS — E66.01 SEVERE OBESITY (HCC): ICD-10-CM

## 2022-05-02 PROCEDURE — 99214 OFFICE O/P EST MOD 30 MIN: CPT | Performed by: INTERNAL MEDICINE

## 2022-05-02 RX ORDER — METOPROLOL SUCCINATE 25 MG/1
25 TABLET, EXTENDED RELEASE ORAL 2 TIMES DAILY
COMMUNITY

## 2022-05-02 NOTE — PROGRESS NOTES
PROGRESS NOTE  Name: Miles Dinero   : 1980       ASSESSMENT/ PLAN:     1. Type 2 diabetes with nephropathy ([facility] [address]): Follows with Dr. Harpreet Marquez labs to him. He is seeing ophthalmologist for retinopathy. 2. Coronary artery disease involving native coronary artery of native heart with unstable angina pectoris [facility]): As per Dr. Raymon Light, cardiology. 3. Severe obesity ([facility] [address]): Diet, exercise. 4. Hypertriglyceridemia:  5. Adjustment disorder with mixed emotional features / Anxiety disorder due to general medical condition with panic attack: Continue follow up with Psychiatry. 6. Insomnia disorder with non-sleep disorder mental comorbidity: Continue follow up with psychiatrist.   7. Multiple lacunar infarcts [facility]): Per neurology. 8. Dizzy spells: Seeing neurology. 9. Cervical spondylosis with radiculopathy: Ongoing, not relieved with MARILY. Future appt planned with Dr. Jeremiah Andre, pain management. 10. GERD: Improved; seeing GI at [facility]. 11. Topical skin allergies: Called in the double antihistamine cream at [facility]. Follow-up and Dispositions  ·   Return in about 6 months (around 2022) for CAD, DM, etc.         I have reviewed the patient's medications and risks/side effects/benefits were discussed. Diagnosis(-es) explained to patient and questions answered. Literature provided where appropriate. SUBJECTIVE:   Mr. Miles Dinero is a 36 y.o. male who is here for follow up of routine medical issues. Chief Complaint   Patient presents with    New Patient    Depression     seeing someone for this here     Medication Refill     request for hydrocortisone and antihistamine gel for poison ivy     Rash     4 years ago regla arm got poison ivy , reoccurs yearly        He uses Medi-Summer \"double antihistamine\" gel, and wants a refill. He sees Dr. Raymon Light for CAD. \"I have 9 stensts\". Last was in , Dr. Brooks Fagan. He has had groin pain that comes and goes. Seeing urology now. Depression: \"My heart attack and my father passing away--it got to me. \"  Sees Antoine P[facility]rez at [facility]. At this time, this is stable. No SI. He sees Dr. Melody Lesches, endocrinologist, for DM. Sees Neurologist, for dizziness. GERD: \"Doing a lot better. \" He has seen gastroenterologist at [facility] and was put on nexium qAM and pepcid bid. Pain: \"I have a bulging disk in my neck, with shooting pain from my neck up to my head. I've had three different opinions. \" He has had three MARILY--relief was fleeting. At this time, he is otherwise doing well and has brought no other complaints to my attention today. For a list of the medical issues addressed today, see the assessment and plan below. PMH:   Past Medical History:   Diagnosis Date    Anxiety and depression     Bleeding of eye, left     8/17/21 pt reports receiving injections in right eye for beeding    Chronic headaches 2007    COVID-19 12/20/2020    Diabetes type 1, uncontrolled     since 8years old    GERD (gastroesophageal reflux disease)     Hypercholesterolemia     Hypertension     Hypothyroidism     MI (myocardial infarction) ([facility] [address]     as of 8/17/21 pt reports 8 stents total    WALLY on CPAP        Past Surgical History:   Procedure Laterality Date    HX CARPAL TUNNEL RELEASE Left 2012    HX CARPAL TUNNEL RELEASE Right 2018    CTE trigger thumb and index finger    HX HEART CATHETERIZATION      HX HEENT      HX LAP CHOLECYSTECTOMY  8/26/14    Dr Kurt Burkitt HX WISDOM TEETH EXTRACTION         All: He is allergic to morphine, penicillin g, percocet [oxycodone-acetaminophen], sulfa (sulfonamide antibiotics), and tramadol. Current Outpatient Medications   Medication Sig    metoprolol succinate (TOPROL-XL) 25 mg XL tablet Take 25 mg by mouth two (2) times a day.  sertraline (ZOLOFT) 100 mg tablet Take 1 Tablet by mouth daily.     sertraline (ZOLOFT) 25 mg tablet Take 1 Tablet by mouth daily.  LORazepam (ATIVAN) 1 mg tablet Take 1 Tablet by mouth two (2) times daily as needed for Anxiety. Max Daily Amount: 2 mg.  levothyroxine (SYNTHROID) 112 mcg tablet     rosuvastatin (CRESTOR) 40 mg tablet Take 40 mg by mouth daily.  testosterone (ANDROGEL) 20.25 mg/1.25 gram (1.62 %) gel Apply 2 pump presses daily. Ok to dispense generic testosterone.  butalbital-acetaminophen-caffeine (FIORICET, ESGIC) -40 mg per tablet Take 1 Tablet by mouth every six (6) hours as needed for Headache. Indications: a migraine headache    famotidine (PEPCID) 40 mg tablet Take 1 Tablet by mouth daily.  busPIRone (BUSPAR) 30 mg tablet Take 1 Tablet by mouth daily. (Patient taking differently: Take 15 mg by mouth two (2) times a day.)    insulin glargine (Lantus U-100 Insulin) 100 unit/mL injection INJECT 90 UNITS SUBCUTANEOUSLY IN THE MORNING AND 90 UNITS AT NIGHT    clopidogreL (PLAVIX) 75 mg tab Take 1 Tablet by mouth daily.  metFORMIN (GLUCOPHAGE) 500 mg tablet Take 1 Tablet by mouth two (2) times daily (with meals).  cholecalciferol (Vitamin D3) (5000 Units/125 mcg) tab tablet Take 1 Tab by mouth daily.  Insulin Syringe-Needle U-100 (BD Insulin Syringe) 1 mL 25 x 1\" syrg Use for drawing up/injecting testosterone once weekly.  aluminum & magnesium hydroxide-simethicone (Maalox Maximum Strength) 400-400-40 mg/5 mL suspension Take 10 mL by mouth every six (6) hours as needed for Indigestion.  nitroglycerin (NITROSTAT) 0.4 mg SL tablet Take 1 Tab by mouth every five (5) minutes as needed for Chest Pain. Sit down then put one tab under the tongue every 5 minutes as needed    aspirin delayed-release 81 mg tablet Take 1 Tab by mouth daily.  propranoloL (INDERAL) 10 mg tablet Take 1 Tablet by mouth three (3) times daily as needed for Anxiety. (Patient not taking: Reported on 5/2/2022)    lidocaine (XYLOCAINE) 2 % solution Take 15 mL by mouth as needed for Pain.  Gargle and spit out to help with pain (Patient not taking: Reported on 5/2/2022)     No current facility-administered medications for this visit. FH: His family history includes Arthritis in his maternal grandmother; Cancer in his father; Diabetes in his father, maternal grandmother, mother, paternal aunt, and paternal grandmother; Heart Disease in his father and maternal grandfather; Hemophilia in his paternal grandfather; High Cholesterol in his maternal grandfather; Hypertension in his maternal grandfather and mother; Kidney Disease in his maternal grandmother; Thyroid Cancer in his maternal grandmother. SH: He is a former , worked for [employer]; Trying to get disability. He reports that he quit smoking about 8 years ago. He smoked 0.00 packs per day for 14.00 years. He has never used smokeless tobacco. He reports that he does not drink alcohol and does not use drugs. ROS: See above; Complete ROS otherwise negative. OBJECTIVE:   Vitals:   Visit Vitals  /78 (BP 1 Location: Left arm, BP Patient Position: Sitting, BP Cuff Size: Adult)   Pulse 72   Temp 98 °F (36.7 °C) (Temporal)   Resp 18   Ht 5' 9\" (1.753 m)   Wt 256 lb (116.1 kg)   SpO2 98%   BMI 37.80 kg/m²      Gen: Pleasant 36 y.o.  male in NAD. HEENT: PERRLA. EOMI. OP moist and pink. Neck: Supple. No LAD. HEART: RRR, No M/G/R.    LUNGS: CTAB No W/R. ABDOMEN: S, NT, ND, BS+. EXTREMITIES: Warm. No C/C/E.  MUSCULOSKELETAL: Normal ROM, muscle strength 5/5 all groups. NEURO: Alert and oriented x 3. Cranial nerves grossly intact. No focal sensory or motor deficits noted. SKIN: Warm. Dry. No rashes or other lesions noted.     Lab Results   Component Value Date/Time    Sodium 131 (L) 04/06/2022 11:23 AM    Potassium 4.8 04/06/2022 11:23 AM    Chloride 101 04/06/2022 11:23 AM    CO2 26 04/06/2022 11:23 AM    Anion gap 4 (L) 04/06/2022 11:23 AM    Glucose 440 (H) 04/06/2022 11:23 AM    BUN 15 04/06/2022 11:23 AM    Creatinine 1.03 04/06/2022 11:23 AM BUN/Creatinine ratio 15 04/06/2022 11:23 AM    GFR est AA >60 04/06/2022 11:23 AM    GFR est non-AA >60 04/06/2022 11:23 AM    Calcium 8.8 04/06/2022 11:23 AM    Bilirubin, total 0.7 04/06/2022 11:23 AM    ALT (SGPT) 55 04/06/2022 11:23 AM    Alk.  phosphatase 91 04/06/2022 11:23 AM    Protein, total 6.9 04/06/2022 11:23 AM    Albumin 3.5 04/06/2022 11:23 AM    Globulin 3.4 04/06/2022 11:23 AM    A-G Ratio 1.0 (L) 04/06/2022 11:23 AM       Lab Results   Component Value Date/Time    Cholesterol, total 142 10/07/2021 01:50 PM    HDL Cholesterol 35 10/07/2021 01:50 PM    LDL,Direct 151 (H) 08/24/2014 02:16 AM    LDL, calculated 62.4 10/07/2021 01:50 PM    Triglyceride 223 (H) 10/07/2021 01:50 PM    CHOL/HDL Ratio 4.1 10/07/2021 01:50 PM        Lab Results   Component Value Date/Time    Hemoglobin A1c 9.9 (H) 10/07/2021 01:50 PM       Lab Results   Component Value Date/Time    WBC 6.8 04/06/2022 11:23 AM    HGB 12.4 04/06/2022 11:23 AM    HCT 37.1 04/06/2022 11:23 AM    PLATELET 943 (L) 08/10/6365 11:23 AM    MCV 83.2 04/06/2022 11:23 AM

## 2022-05-02 NOTE — TELEPHONE ENCOUNTER
Patient was recently prescribed PROPRANOLOL, but was advised by another of their providers not to take the medication due to existing prescriptions. Wanted to request a new script for a different medication if possible.     CB: [phone]

## 2022-05-06 DIAGNOSIS — F41.1 GAD (GENERALIZED ANXIETY DISORDER): Primary | ICD-10-CM

## 2022-05-06 DIAGNOSIS — F43.29 ADJUSTMENT DISORDER WITH MIXED EMOTIONAL FEATURES: ICD-10-CM

## 2022-05-06 RX ORDER — BUSPIRONE HYDROCHLORIDE 15 MG/1
15 TABLET ORAL 3 TIMES DAILY
Qty: 90 TABLET | Refills: 2 | Status: SHIPPED | OUTPATIENT
Start: 2022-05-06 | End: 2022-08-12 | Stop reason: SDUPTHER

## 2022-05-22 DIAGNOSIS — F41.1 GAD (GENERALIZED ANXIETY DISORDER): ICD-10-CM

## 2022-05-23 RX ORDER — SERTRALINE HYDROCHLORIDE 25 MG/1
TABLET, FILM COATED ORAL
Qty: 30 TABLET | Refills: 0 | Status: SHIPPED | OUTPATIENT
Start: 2022-05-23 | End: 2022-05-26 | Stop reason: DRUGHIGH

## 2022-05-26 ENCOUNTER — OFFICE VISIT (OUTPATIENT)
Dept: BEHAVIORAL/MENTAL HEALTH CLINIC | Age: 42
End: 2022-05-26
Payer: COMMERCIAL

## 2022-05-26 VITALS
WEIGHT: 254 LBS | BODY MASS INDEX: 37.62 KG/M2 | HEART RATE: 71 BPM | RESPIRATION RATE: 18 BRPM | HEIGHT: 69 IN | DIASTOLIC BLOOD PRESSURE: 60 MMHG | SYSTOLIC BLOOD PRESSURE: 106 MMHG | OXYGEN SATURATION: 98 % | TEMPERATURE: 97.1 F

## 2022-05-26 DIAGNOSIS — F43.10 PTSD (POST-TRAUMATIC STRESS DISORDER): ICD-10-CM

## 2022-05-26 DIAGNOSIS — G47.00 INSOMNIA DISORDER WITH NON-SLEEP DISORDER MENTAL COMORBIDITY: Primary | ICD-10-CM

## 2022-05-26 DIAGNOSIS — F41.1 GAD (GENERALIZED ANXIETY DISORDER): ICD-10-CM

## 2022-05-26 DIAGNOSIS — F43.29 ADJUSTMENT DISORDER WITH MIXED EMOTIONAL FEATURES: ICD-10-CM

## 2022-05-26 PROCEDURE — 99214 OFFICE O/P EST MOD 30 MIN: CPT | Performed by: NURSE PRACTITIONER

## 2022-05-26 RX ORDER — LORAZEPAM 1 MG/1
1 TABLET ORAL
Qty: 90 TABLET | Refills: 0 | Status: SHIPPED | OUTPATIENT
Start: 2022-05-26 | End: 2022-10-13 | Stop reason: SDUPTHER

## 2022-05-26 RX ORDER — ESOMEPRAZOLE MAGNESIUM 40 MG/1
CAPSULE, DELAYED RELEASE ORAL
COMMUNITY
Start: 2022-04-27

## 2022-05-26 RX ORDER — SERTRALINE HYDROCHLORIDE 100 MG/1
100 TABLET, FILM COATED ORAL DAILY
Qty: 30 TABLET | Refills: 2 | Status: SHIPPED | OUTPATIENT
Start: 2022-05-26 | End: 2022-07-11 | Stop reason: SDUPTHER

## 2022-05-26 RX ORDER — SERTRALINE HYDROCHLORIDE 25 MG/1
25 TABLET, FILM COATED ORAL DAILY
Qty: 30 TABLET | Refills: 2 | Status: SHIPPED | OUTPATIENT
Start: 2022-05-26 | End: 2022-07-11 | Stop reason: SDUPTHER

## 2022-05-26 NOTE — PROGRESS NOTES
Chief Complaint   Patient presents with    Anxiety     4w f/u       1. Have you been to the ER, urgent care clinic since your last visit? Hospitalized since your last visit? No    2. Have you seen or consulted any other health care providers outside of the [address] [place] [address] since your last visit? Include any pap smears or colon screening. No    Pt was pulled directly to providers, vitals taken after visit.

## 2022-05-26 NOTE — PROGRESS NOTES
CHIEF COMPLAINT:  Jerry Spear is a 36 y.o. male and was seen today for follow-up of psychiatric condition and psychotropic medication management. HPI:    Lubna Miller reports the following psychiatric symptoms by hx:  depression and anxiety. Overall symptoms have been present for years. Currently symptoms are is of moderate/high severity. Anxiety is exacerbated. The symptoms occur intermittently. Pt reports medications are effective but he continues to have at least 1 panic attack daily. Precipitating factors: mother's illness, Alleviating factors: none Met with pt for appt today  to review current treatment plan. FAMILY/SOCIAL HX:   Psychosocial Stressors     REVIEW OF SYSTEMS:  Psychiatric symptoms being monitored for:  depression, anxiety   Appetite:good   Sleep: poor with DIS (difficulty initiating sleep)   Neuro: denies   Cardio: denies     Visit Vitals  Ht 5' 9\" (1.753 m)   BMI 37.80 kg/m²       Side Effects:  none    MENTAL STATUS EXAM:   Sensorium  oriented to time, place and person   Relations cooperative   Appearance:  age appropriate and within normal Limits   Motor Behavior:  within normal limits   Speech:  normal pitch and normal volume   Thought Process: within normal limits   Thought Content free of delusions and free of hallucinations   Suicidal ideations none   Homicidal ideations none   Mood:  anxious   Affect:  mood-congruent   Memory recent  adequate   Memory remote:  adequate   Concentration:  adequate   Abstraction:  abstract   Insight:  good   Reliability good   Judgment:  good     MEDICAL DECISION MAKING:  Problems addressed today:    ICD-10-CM ICD-9-CM    1. Insomnia disorder with non-sleep disorder mental comorbidity  G47.00 780.52    2. CESAR (generalized anxiety disorder)  F41.1 300.02 sertraline (ZOLOFT) 100 mg tablet      LORazepam (ATIVAN) 1 mg tablet      sertraline (ZOLOFT) 25 mg tablet   3. PTSD (post-traumatic stress disorder)  F43.10 309.81    4.  Adjustment disorder with mixed emotional features  F43.29 309.9        Assessment:   Kayli is responding to treatment. Symptoms are less in severity and less in occurrence. Patient reports increased anxiety and panic attacks do to fear of death and mother had CVA. Discussed current medications and dosages. Recommend therapy possibly EMDR and ART. Educated patient on trauma. Will increase the Ativan to every hours 8. Patient is unable to take other medications for anxiety due to cardiac hx. Discussed risks vs benefits, safety, tolerance, and risks of physical and psychological dependence. Reviewed treatment goals and target symptoms to monitor for. Denies SI/HI/AH/VH and delusions. Plan:   1. Current Outpatient Medications   Medication Sig Dispense Refill    sertraline (ZOLOFT) 100 mg tablet Take 1 Tablet by mouth daily. 30 Tablet 2    LORazepam (ATIVAN) 1 mg tablet Take 1 Tablet by mouth every eight (8) hours as needed for Anxiety. Max Daily Amount: 3 mg. 90 Tablet 0    sertraline (ZOLOFT) 25 mg tablet Take 1 Tablet by mouth daily. 30 Tablet 2    esomeprazole (NEXIUM) 40 mg capsule TAKE 1 CAPSULE BY MOUTH ONCE DAILY BEFORE BREAKFAST DO NOT OPEN CAPSULE      levothyroxine (SYNTHROID) 112 mcg tablet TAKE 1 TABLET BY MOUTH ONCE DAILY BEFORE BREAKFAST 90 Tablet 3    busPIRone (BUSPAR) 15 mg tablet Take 1 Tablet by mouth three (3) times daily. 90 Tablet 2    metoprolol succinate (TOPROL-XL) 25 mg XL tablet Take 25 mg by mouth two (2) times a day.  rosuvastatin (CRESTOR) 40 mg tablet Take 40 mg by mouth daily.  testosterone (ANDROGEL) 20.25 mg/1.25 gram (1.62 %) gel Apply 2 pump presses daily. Ok to dispense generic testosterone. 1 Each 3    lidocaine (XYLOCAINE) 2 % solution Take 15 mL by mouth as needed for Pain.  Gargle and spit out to help with pain (Patient not taking: Reported on 5/2/2022) 1 Each 0    butalbital-acetaminophen-caffeine (FIORICET, ESGIC) -40 mg per tablet Take 1 Tablet by mouth every six (6) hours as needed for Headache. Indications: a migraine headache 10 Tablet 0    famotidine (PEPCID) 40 mg tablet Take 1 Tablet by mouth daily. 30 Tablet 3    insulin glargine (Lantus U-100 Insulin) 100 unit/mL injection INJECT 90 UNITS SUBCUTANEOUSLY IN THE MORNING AND 90 UNITS AT NIGHT 60 mL 5    clopidogreL (PLAVIX) 75 mg tab Take 1 Tablet by mouth daily. 30 Tablet 12    metFORMIN (GLUCOPHAGE) 500 mg tablet Take 1 Tablet by mouth two (2) times daily (with meals). 180 Tablet 3    cholecalciferol (Vitamin D3) (5000 Units/125 mcg) tab tablet Take 1 Tab by mouth daily. 90 Tab 1    Insulin Syringe-Needle U-100 (BD Insulin Syringe) 1 mL 25 x 1\" syrg Use for drawing up/injecting testosterone once weekly. 100 Each 3    aluminum & magnesium hydroxide-simethicone (Maalox Maximum Strength) 400-400-40 mg/5 mL suspension Take 10 mL by mouth every six (6) hours as needed for Indigestion.  nitroglycerin (NITROSTAT) 0.4 mg SL tablet Take 1 Tab by mouth every five (5) minutes as needed for Chest Pain. Sit down then put one tab under the tongue every 5 minutes as needed 1 Bottle 0    aspirin delayed-release 81 mg tablet Take 1 Tab by mouth daily. 30 Tab 0          medication changes made today: Ativan 1 mg po  every 8 hours as needed. 2.  Counseling and coordination of care including instructions for treatment, risks/benefits, risk factor reduction and patient/family education. He agrees with the plan. Patient instructed to call with any side effects, questions or issues.           5/26/2022  Malina Lainez NP

## 2022-06-10 NOTE — Clinical Note
Catheter inserted. Quality 47: Advance Care Plan: Advance Care Planning discussed and documented; advance care plan or surrogate decision maker documented in the medical record. Quality 431: Preventive Care And Screening: Unhealthy Alcohol Use - Screening: Patient screened for unhealthy alcohol use using a single question and scores less than 2 times per year Quality 226: Preventive Care And Screening: Tobacco Use: Screening And Cessation Intervention: Patient screened for tobacco use and is an ex/non-smoker Quality 130: Documentation Of Current Medications In The Medical Record: Current Medications Documented Detail Level: Detailed

## 2022-06-13 ENCOUNTER — OFFICE VISIT (OUTPATIENT)
Dept: NEUROLOGY | Age: 42
End: 2022-06-13
Payer: COMMERCIAL

## 2022-06-13 VITALS
SYSTOLIC BLOOD PRESSURE: 140 MMHG | OXYGEN SATURATION: 97 % | WEIGHT: 258 LBS | HEART RATE: 80 BPM | HEIGHT: 69 IN | TEMPERATURE: 98 F | DIASTOLIC BLOOD PRESSURE: 78 MMHG | RESPIRATION RATE: 16 BRPM | BODY MASS INDEX: 38.21 KG/M2

## 2022-06-13 DIAGNOSIS — F43.29 ADJUSTMENT DISORDER WITH MIXED EMOTIONAL FEATURES: ICD-10-CM

## 2022-06-13 DIAGNOSIS — E10.59 TYPE 1 DIABETES MELLITUS WITH OTHER CIRCULATORY COMPLICATION (HCC): Chronic | ICD-10-CM

## 2022-06-13 DIAGNOSIS — R42 DIZZY SPELLS: ICD-10-CM

## 2022-06-13 DIAGNOSIS — I65.23 BILATERAL CAROTID ARTERY STENOSIS: ICD-10-CM

## 2022-06-13 DIAGNOSIS — I63.81 MULTIPLE LACUNAR INFARCTS (HCC): ICD-10-CM

## 2022-06-13 DIAGNOSIS — M47.22 CERVICAL SPONDYLOSIS WITH RADICULOPATHY: ICD-10-CM

## 2022-06-13 DIAGNOSIS — G43.009 MIGRAINE WITHOUT AURA AND WITHOUT STATUS MIGRAINOSUS, NOT INTRACTABLE: Primary | ICD-10-CM

## 2022-06-13 PROCEDURE — 3046F HEMOGLOBIN A1C LEVEL >9.0%: CPT | Performed by: PSYCHIATRY & NEUROLOGY

## 2022-06-13 PROCEDURE — 99215 OFFICE O/P EST HI 40 MIN: CPT | Performed by: PSYCHIATRY & NEUROLOGY

## 2022-06-13 RX ORDER — BUTALBITAL, ACETAMINOPHEN AND CAFFEINE 50; 325; 40 MG/1; MG/1; MG/1
1 TABLET ORAL
Qty: 10 TABLET | Refills: 0 | Status: SHIPPED | OUTPATIENT
Start: 2022-06-13

## 2022-06-13 NOTE — ASSESSMENT & PLAN NOTE
Remains poorly controlled   Pt states working on [facility] issues    Discussed diet and exercise r/t BS issues and encouraged pt to continue to f/u with specialist as appropriate

## 2022-06-13 NOTE — PROGRESS NOTES
Chief Complaint   Patient presents with    Follow-up     follow up on migraines which is ok but has other underlying issues that he is usure what is going on.   moving head gets dizzy.   sometime for no reason will feel like spinning and but everything else is staying still.  has left ear nosies like pops and like water is in the ear.  hear the heart beat in left ear. has very high blood sugar ove [id number]

## 2022-06-13 NOTE — ASSESSMENT & PLAN NOTE
Patient is already on 81 mg aspirin daily  Stenosis is less than 50% bilaterally as of 9/20/2021 patient is not particularly symptomatic we will look to repeat duplex testing at next office visit

## 2022-06-13 NOTE — ASSESSMENT & PLAN NOTE
Probably multifactorial in nature MRI did not show any acute events and duplex studies did not show any significant stenosis    Most likely this is related to blood sugar issues as well as hypertensive issues and other medical problems    Patient's been encouraged to keep his multiple core medical conditions under his good control as possible    Recommend possibly using Zyrtec or like product but without the \"D\" component for about 30 days and if no improvement should follow-up with ENT

## 2022-06-13 NOTE — ASSESSMENT & PLAN NOTE
Being followed by mental health encourage patient continue to follow-up with mental health routinely    Have also recommended not using butalbital any later than 6 PM since he takes clonazepam at night

## 2022-06-13 NOTE — ASSESSMENT & PLAN NOTE
Duplex did not show any hemodynamically significant stenosis MRI without any acute issues    Patient remains on aspirin and Plavix [for cardiac reasons but will certainly manage to prophylax in this arena]    Patient is to follow-up with all of his specialist as appropriate he remains high risk for cerebrovascular events secondary to cardiovascular issues as well as poorly controlled diabetes

## 2022-06-13 NOTE — PATIENT INSTRUCTIONS
As per discussion    I renewed the Fioricet and then I am also can give Claudia Ledesma I may have to switch to NurTec depending on what your insurance allows us to prescribe] we do need to get prior authorization for the Ubrelvy/NurTec  You are able to use these medications pretty liberally they do not interfere with other medications your liver your kidney or have any cardiac ramifications    Continue to work on your diet regarding your blood sugars you have done a good job so far just keep pushing and get very diligent with your insulins you are eating habits etc.  Continue to follow-up with your specialist as appropriate    Below is some information on [id number] as well as diabetic diet for you to review      Office Policies      o Appointments  Please make sure that you arrive for your next appointment at least 15 minutes prior to your appointment time. If for some reason you are going to be late please notify the office to determine if you need to be rescheduled or we can adjust your appointment time      o Phone calls/patient messages:  Please allow up to 24 hours for someone in the office to contact you about your call or message. Be mindful your provider may be out of the office or your message may require further review. We encourage you to use [employer] for your messages as this is a faster, more efficient way to communicate with our office    o Medication Refills:  Prescription medications require up to 48 business hours to process. We encourage you to use [employer] for your refills. For controlled medications: Please allow up to 72 business hours to process. Certain medications may require you to  a written prescription at our office. NO narcotic/controlled medications will be prescribed after 4pm Monday through Friday or on weekends    o Form/Paperwork Completion:  We ask that you allow 7-14 business days.  You may also download your forms to [employer] to have your doctor print off.               Learning About Carbohydrate (Carb) Counting and Eating Out When You Have Diabetes  Why plan your meals? Meal planning can be a key part of managing diabetes. Planning meals and snacks with the right balance of carbohydrate, protein, and fat can help you keep your blood sugar at the target level you set with your doctor. You don't have to eat special foods. You can eat what your family eats, including sweets once in a while. But you do have to pay attention to how often you eat and how much you eat of certain foods. You may want to work with a dietitian or a diabetes educator. They can give you tips and meal ideas and can answer your questions about meal planning. This health professional can also help you reach a healthy weight if that is one of your goals. What should you know about eating carbs? Managing the amount of carbohydrate (carbs) you eat is an important part of healthy meals when you have diabetes. Carbohydrate is found in many foods. · Learn which foods have carbs. And learn the amounts of carbs in different foods. ? Bread, cereal, pasta, and rice have about 15 grams of carbs in a serving. A serving is 1 slice of bread (1 ounce), ½ cup of cooked cereal, or 1/3 cup of cooked pasta or rice. ? Fruits have 15 grams of carbs in a serving. A serving is 1 small fresh fruit, such as an apple or orange; ½ of a banana; ½ cup of cooked or canned fruit; ½ cup of fruit juice; 1 cup of melon or raspberries; or 2 tablespoons of dried fruit. ? Milk and no-sugar-added yogurt have 15 grams of carbs in a serving. A serving is 1 cup of milk or 3/4 cup (6 oz) of no-sugar-added yogurt. ? Starchy vegetables have 15 grams of carbs in a serving. A serving is ½ cup of mashed potatoes or sweet potato; 1 cup winter squash; ½ of a small baked potato; ½ cup of cooked beans; or ½ cup cooked corn or green peas.   · Learn how much carbs to eat each day and at each meal. A dietitian or certified diabetes educator can teach you how to keep track of the amount of carbs you eat. This is called carbohydrate counting. · If you are not sure how to count carbohydrate grams, use the plate method to plan meals. It is a quick way to make sure that you have a balanced meal. It also can help you manage the amount of carbohydrate you eat at meals. ? Divide your plate by types of foods. Put non-starchy vegetables on half the plate, meat or other protein food on one-quarter of the plate, and a grain or starchy vegetable in the final quarter of the plate. To this you can add a small piece of fruit and 1 cup of milk or yogurt, depending on how many carbs you are supposed to eat at a meal.  · Try to eat about the same amount of carbs at each meal. Do not \"save up\" your daily allowance of carbs to eat at one meal.  · Proteins have very little or no carbs. Examples of proteins are beef, chicken, turkey, fish, eggs, tofu, cheese, cottage cheese, and peanut butter. How can you eat out and still eat healthy? · Learn to estimate the serving sizes of foods that have carbohydrate. If you measure food at home, it will be easier to estimate the amount in a serving of restaurant food. · If the meal you order has too much carbohydrate (such as potatoes, corn, or baked beans), ask to have a low-carbohydrate food instead. Ask for a salad or non-starchy vegetables like broccoli, cauliflower, green beans, or peppers. · If you eat more carbohydrate at a meal than you had planned, take a walk or do other exercise. This will help lower your blood sugar. What are some tips for eating healthy? · Limit saturated fat, such as the fat from meat and dairy products. This is a healthy choice because people who have diabetes are at higher risk of heart disease. So choose lean cuts of meat and nonfat or low-fat dairy products. Use olive or canola oil instead of butter or shortening when cooking. · Don't skip meals.  Your blood sugar may drop too low if you skip meals and take insulin or certain medicines for diabetes. · Check with your doctor before you drink alcohol. Alcohol can cause your blood sugar to drop too low. Alcohol can also cause a bad reaction if you take certain diabetes medicines. Follow-up care is a key part of your treatment and safety. Be sure to make and go to all appointments, and call your doctor if you are having problems. It's also a good idea to know your test results and keep a list of the medicines you take. Where can you learn more? Go to http://www.herrera.com/  Enter I147 in the search box to learn more about \"Learning About Carbohydrate (Carb) Counting and Eating Out When You Have Diabetes. \"  Current as of: September 8, 2021               Content Version: 13.2  © 2006-2022 [employer]. Care instructions adapted under license by [employer] (which disclaims liability or warranty for this information). If you have questions about a medical condition or this instruction, always ask your healthcare professional. [place] [address] any warranty or liability for your use of this information. Learning About the [address] Diet  What is the Mediterranean diet? The Mediterranean diet is a style of eating rather than a diet plan. It features foods eaten in [place], [place], [place] and [place], and other countries along the [facility]. It emphasizes eating foods like fish, fruits, vegetables, beans, high-fiber breads and whole grains, nuts, and olive oil. This style of eating includes limited red meat, cheese, and sweets. Why choose the Mediterranean diet? A Mediterranean-style diet may improve heart health. It contains more fat than other heart-healthy diets. But the fats are mainly from nuts, unsaturated oils (such as fish oils and olive oil), and certain nut or seed oils (such as canola, soybean, or flaxseed oil).  These fats may help protect the heart and blood vessels. How can you get started on the Mediterranean diet? Here are some things you can do to switch to a more Mediterranean way of eating. What to eat  · Eat a variety of fruits and vegetables each day, such as grapes, blueberries, tomatoes, broccoli, peppers, figs, olives, spinach, eggplant, beans, lentils, and chickpeas. · Eat a variety of whole-grain foods each day, such as oats, brown rice, and whole wheat bread, pasta, and couscous. · Eat fish at least 2 times a week. Try tuna, salmon, mackerel, lake trout, herring, or sardines. · Eat moderate amounts of low-fat dairy products, such as milk, cheese, or yogurt. · Eat moderate amounts of poultry and eggs. · Choose healthy (unsaturated) fats, such as nuts, olive oil, and certain nut or seed oils like canola, soybean, and flaxseed. · Limit unhealthy (saturated) fats, such as butter, palm oil, and coconut oil. And limit fats found in animal products, such as meat and dairy products made with whole milk. Try to eat red meat only a few times a month in very small amounts. · Limit sweets and desserts to only a few times a week. This includes sugar-sweetened drinks like soda. The Mediterranean diet may also include red wine with your meal--1 glass each day for women and up to 2 glasses a day for men. Tips for eating at home  · Use herbs, spices, garlic, lemon zest, and citrus juice instead of salt to add flavor to foods. · Add avocado slices to your sandwich instead of dennis. · Have fish for lunch or dinner instead of red meat. Brush the fish with olive oil, and broil or grill it. · Sprinkle your salad with seeds or nuts instead of cheese. · Cook with olive or canola oil instead of butter or oils that are high in saturated fat. · Switch from 2% milk or whole milk to 1% or fat-free milk.   · Dip raw vegetables in a vinaigrette dressing or hummus instead of dips made from mayonnaise or sour cream.  · Have a piece of fruit for dessert instead of a piece of cake. Try baked apples, or have some dried fruit. Tips for eating out  · Try broiled, grilled, baked, or poached fish instead of having it fried or breaded. · Ask your  to have your meals prepared with olive oil instead of butter. · Order dishes made with marinara sauce or sauces made from olive oil. Avoid sauces made from cream or mayonnaise. · Choose whole-grain breads, whole wheat pasta and pizza crust, brown rice, beans, and lentils. · Cut back on butter or margarine on bread. Instead, you can dip your bread in a small amount of olive oil. · Ask for a side salad or grilled vegetables instead of french fries or chips. Where can you learn more? Go to http://www.herrera.com/  Enter O407 in the search box to learn more about \"Learning About the Mediterranean Diet. \"  Current as of: September 8, 2021               Content Version: 13.2  © 2006-2022 Healthwise, [facility]. Care instructions adapted under license by [employer] (which disclaims liability or warranty for this information). If you have questions about a medical condition or this instruction, always ask your healthcare professional. [place] [address] any warranty or liability for your use of this information.

## 2022-06-13 NOTE — ASSESSMENT & PLAN NOTE
Still continues with persistent neck pain is being followed by Ortho and has been seen at [facility] as well

## 2022-06-13 NOTE — PROGRESS NOTES
[facility] 82  In Office FOLLOW-UP VISIT         Liya Thibodeaux is a 36 y.o. male who presents today for the following:  Chief Complaint   Patient presents with    Follow-up     follow up on migraines which is ok but has other underlying issues that he is usure what is going on.   moving head gets dizzy. sometime for no reason will feel like spinning and but everything else is staying still.  has left ear nosies like pops and like water is in the ear.  hear the heart beat in left ear. has very high blood sugar ove [id number]         ASSESSMENT AND PLAN      1. Migraine without aura and without status migrainosus, not intractable  Assessment & Plan:   Has not traditionally done well with preventative medications  He is limited in his rescue medication secondary to cardiac and cerebrovascular issues subsequently triptans and ergotamine's are contraindicated    I renewed the Fioricet  I have prescribed Ubrelvy for rescue as well hopefully will not address too many insurance barriers regarding this  Orders:  -     ubrogepant (UBRELVY) 100 mg tablet; Take 1 Tablet by mouth daily as needed for Migraine. Indications: a migraine headache, Normal, Disp-16 Tablet, R-12  -     butalbital-acetaminophen-caffeine (FIORICET, ESGIC) -40 mg per tablet; Take 1 Tablet by mouth every six (6) hours as needed for Headache. Indications: a migraine headache, Normal, Disp-10 Tablet, R-0  2. Multiple lacunar infarcts [facility])  Assessment & Plan:  Duplex did not show any hemodynamically significant stenosis MRI without any acute issues    Patient remains on aspirin and Plavix [for cardiac reasons but will certainly manage to prophylax in this arena]    Patient is to follow-up with all of his specialist as appropriate he remains high risk for cerebrovascular events secondary to cardiovascular issues as well as poorly controlled diabetes  3.  Dizzy spells  Assessment & Plan:   Probably multifactorial in nature MRI did not show any acute events and duplex studies did not show any significant stenosis    Most likely this is related to blood sugar issues as well as hypertensive issues and other medical problems    Patient's been encouraged to keep his multiple core medical conditions under his good control as possible    Recommend possibly using Zyrtec or like product but without the \"D\" component for about 30 days and if no improvement should follow-up with ENT  4. Cervical spondylosis with radiculopathy  Assessment & Plan:   Still continues with persistent neck pain is being followed by Ortho and has been seen at [facility] as well      5. Bilateral carotid artery stenosis  Assessment & Plan:   Patient is already on 81 mg aspirin daily  Stenosis is less than 50% bilaterally as of 9/20/2021 patient is not particularly symptomatic we will look to repeat duplex testing at next office visit  6. Adjustment disorder with mixed emotional features  Assessment & Plan:   Being followed by mental health encourage patient continue to follow-up with mental health routinely    Have also recommended not using butalbital any later than 6 PM since he takes clonazepam at night  7. Type 1 diabetes mellitus with other circulatory complication [facility])  Assessment & Plan:  Remains poorly controlled   Pt states working on [employer] issues    Discussed diet and exercise r/t BS issues and encouraged pt to continue to f/u with specialist as appropriate          Patient and/or family was given time to ask questions and voice concerns. I believe all questions concerns were adequately addressed at this  office visit.   Patient and/or family also verbalized agreement and understanding of the above-stated plan    Patient remains a complex patient secondary to polypharmacy, significant comorbid conditions, and use of high-risk medications which complicate the decision making process related to patient's neurologic diagnosis    Follow-up and Dispositions    · Return in about 6 months (around 12/13/2022) for In office appointment. ICD-10-CM ICD-9-CM    1. Migraine without aura and without status migrainosus, not intractable  G43.009 346.10 ubrogepant (UBRELVY) 100 mg tablet      butalbital-acetaminophen-caffeine (FIORICET, ESGIC) -40 mg per tablet   2. Multiple lacunar infarcts (HCC)  I63.81 434.91    3. Dizzy spells  R42 780.4    4. Cervical spondylosis with radiculopathy  M47.22 721.0    5. Bilateral carotid artery stenosis  I65.23 433.10      433.30    6. Adjustment disorder with mixed emotional features  F43.29 309.9    7. Type 1 diabetes mellitus with other circulatory complication (HCC)  D26.50 250.71      443.81          I attest that 40 minutes was spent on today's visit reviewing medical records and diagnostic testing deemed pertinent to this patient's care, along with direct time spent at patient's visit including the history, physical assessment and plan, discussing diagnosis and management along with documentation. HPI  Historical Data  Patient is known to the practice and was previously seen by Dr. Perez Gerardo    Neurologic diagnosis  Headaches and migraines  He has been having headaches for a long time but has been having worsening headaches since 2012. He does have close to 20 headache days in a month. He has 2 kinds of headache, one but starts of the right occipital area which is a sharp headache that shoots to the right frontal.  He does also have headaches in the left side as well. His headaches can also be pounding and throbbing and the headache severity is between 3-10 out of 10. He does have associated photophobia, phonophobia and osmophobia. Each headache can last for 4-5 days. Risk Factors for headaches  Smoking: Denies denies  Coffee: Denies cups/day  Tea: Denies cups/day  Soda: 6 cans/day  Water: 1-2 glasses/day  Sleeps at 8-11 p.m. and wakes up at 5:30 aM. He does snore.   He has been diagnosed with obstructive sleep apnea but did not get the CPAP machine because he lost his insurance.      Medications tried  Preventative therapy:  Topamax  Metoprolol  Amitriptyline  Botox     Abortive therapy   Fioricet    Other significant comorbid conditions/concerns  Obstructive sleep apnea: biPap  MI  DM  COVID 19 February 2022    ED visits  9/4/2021 for rash: Penile swelling and redness   Patient had fever blisters and was started on Valtrex   He was treated for yeast infection the penile region with Monistat and Diflucan    To the ED on 10/27/2021 and he was admitted to the hospital and discharged on 10/31/2021   Presented to the ED chest discomfort    Discharge diagnosis was dizziness and abdominal pain    Uncontrolled type 1 diabetes    Cardiac cath completed had PCI of CX marginal for stent restenosis: was restented     Was to continue on aspirin and Plavix      ED on 12/1/2021 for back pain: Has old chronic upper back pain but was in because of low back pain radiating to left lower leg   Was discharged on Norco 5/325 15 tablets no refills    Between January and April 2022:  Patient has been to the ED multiple times sinus problems, hip pain, COVID 19 positive, nosebleed        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 get disability    Interim Data:     Since last office visit   He is now being seen by mental health for anxiety and depression and insomnia along with PTSD    Cardiac rehab on hold due to other issues   But patient plans on getting this reinitiated      Dizziness  With position changes: lightheaded and bending and also when he stands up  If gets up might get lightheaded  If puts head down can occur  Pre-syncope but no actual syncopal events  Denies any acute neurologic symptoms related to these events   MRI of the brain completed and was normal and his results were reviewed at office visit of 12/3/2021 along with sending a note MyChart to the patient previously  OV 6/13/2022: Feels as though he is also having some pulsatile tinnitus that comes and goes he has not been to ENT regarding this    Having shooting pain from RT neck to Headaches and down Rt arm   Going to [facility]     Headaches/ migraines  2-3x/week   Patient is out of Fioricet and does not like to use OTCs  Check -300's    Other  Diabetes: blood sugar normally \"ok\" runs between 150-200 since d/c from hospital end of Oct 2021   12/3/21:    LT eye: \"getting shots in eyes\"   Had surgery and now Left eye much improved   Continues to be followed routinely              Pertinent diagnostic data    Lab Results   Component Value Date/Time    Hemoglobin A1c 9.9 (H) 10/07/2021 01:50 PM    Hemoglobin A1c (POC) 9.1 01/14/2022 11:36 AM    Hemoglobin A1c, External 10.1 04/26/2022 12:00 AM         Carotid duplex studies completed 9/1/2021  This study consisted of pulsed wave Doppler examination, color flow imaging, and duplex imaging of both right and left carotid systems in both vertebral arteries     Imaging of both right and left carotid systems showed mild mixed plaque in the bifurcations and proximal and distal internal carotid arteries bilaterally with stenosis in the range of 16-49% only and with no flow abnormalities identified. Both external carotid arteries and both common carotid arteries showed showed normal antegrade flow, and showed mild disease in the range of 0-15% only without associated flow abnormalities.     Left vertebral artery showed normal antegrade flow, but the right vertebral artery was not well visualized, most likely representing technical difficulty, but cannot completely rule out obstructive cerebrovascular disease or congenital variant, so if clinically indicated other imaging modalities like CTA or MRA may be of further diagnostic benefit, if clinically indicated.     Clinical correlation recommended.       11/28/19    ECHO ADULT COMPLETE 11/29/2019 11/29/2019    Interpretation Summary  · Left Ventricle: Normal cavity size, systolic function (ejection fraction normal) and diastolic function. Moderate concentric hypertrophy. Estimated left ventricular ejection fraction is 61 - 65%. · Mitral Valve: Trace mitral valve regurgitation is present. Signed by: Yusuf Phelan MD on 11/29/2019 12:26 PM      Results from Hospital Encounter encounter on 09/03/21    MRI BRAIN WO CONT    Impression  No acute intracranial abnormality. Normal IACs. Results from [place] encounter on 08/03/19    MRI BRAIN W WO CONT    Impression  IMPRESSION: Small bilateral remote basal ganglia lacunes . No acute lesion no  masses. 12/01/21:  Lumbar spine x-rays    IMPRESSION  No acute bony abnormality of the lumbar spine is confirmed. See  discussion above. Allergies   Allergen Reactions    Morphine Nausea and Vomiting    Penicillin G Swelling    Percocet [Oxycodone-Acetaminophen] Hives and Nausea and Vomiting     Pt is allergic to Oxycodone, has previously tolerated Tylenol and Hydrocodone.  Sulfa (Sulfonamide Antibiotics) Swelling    Tramadol Nausea Only     Nausea and headache         Current Outpatient Medications   Medication Sig    ubrogepant (UBRELVY) 100 mg tablet Take 1 Tablet by mouth daily as needed for Migraine. Indications: a migraine headache    butalbital-acetaminophen-caffeine (FIORICET, ESGIC) -40 mg per tablet Take 1 Tablet by mouth every six (6) hours as needed for Headache. Indications: a migraine headache    esomeprazole (NEXIUM) 40 mg capsule TAKE 1 CAPSULE BY MOUTH ONCE DAILY BEFORE BREAKFAST DO NOT OPEN CAPSULE    sertraline (ZOLOFT) 100 mg tablet Take 1 Tablet by mouth daily.  LORazepam (ATIVAN) 1 mg tablet Take 1 Tablet by mouth every eight (8) hours as needed for Anxiety. Max Daily Amount: 3 mg.  sertraline (ZOLOFT) 25 mg tablet Take 1 Tablet by mouth daily.     levothyroxine (SYNTHROID) 112 mcg tablet TAKE 1 TABLET BY MOUTH ONCE DAILY BEFORE BREAKFAST    busPIRone (BUSPAR) 15 mg tablet Take 1 Tablet by mouth three (3) times daily.  metoprolol succinate (TOPROL-XL) 25 mg XL tablet Take 25 mg by mouth two (2) times a day.  rosuvastatin (CRESTOR) 40 mg tablet Take 40 mg by mouth daily.  testosterone (ANDROGEL) 20.25 mg/1.25 gram (1.62 %) gel Apply 2 pump presses daily. Ok to dispense generic testosterone.  famotidine (PEPCID) 40 mg tablet Take 1 Tablet by mouth daily.  insulin glargine (Lantus U-100 Insulin) 100 unit/mL injection INJECT 90 UNITS SUBCUTANEOUSLY IN THE MORNING AND 90 UNITS AT NIGHT    clopidogreL (PLAVIX) 75 mg tab Take 1 Tablet by mouth daily.  metFORMIN (GLUCOPHAGE) 500 mg tablet Take 1 Tablet by mouth two (2) times daily (with meals).  cholecalciferol (Vitamin D3) (5000 Units/125 mcg) tab tablet Take 1 Tab by mouth daily.  Insulin Syringe-Needle U-100 (BD Insulin Syringe) 1 mL 25 x 1\" syrg Use for drawing up/injecting testosterone once weekly.  aluminum & magnesium hydroxide-simethicone (Maalox Maximum Strength) 400-400-40 mg/5 mL suspension Take 10 mL by mouth every six (6) hours as needed for Indigestion.  nitroglycerin (NITROSTAT) 0.4 mg SL tablet Take 1 Tab by mouth every five (5) minutes as needed for Chest Pain. Sit down then put one tab under the tongue every 5 minutes as needed    aspirin delayed-release 81 mg tablet Take 1 Tab by mouth daily.  lidocaine (XYLOCAINE) 2 % solution Take 15 mL by mouth as needed for Pain. Gargle and spit out to help with pain (Patient not taking: Reported on 5/2/2022)     No current facility-administered medications for this visit.        Past medical history/surgical history, family history, and social history have been reviewed for today's visit      ROS    A ten system review of constitutional, cardiovascular, respiratory, musculoskeletal, endocrine, skin, SHEENT, genitourinary, psychiatric and neurologic systems was obtained and is unremarkable except as mentioned under HPI          EXAMINATION: Visit Vitals  BP (!) 140/78 (BP 1 Location: Left upper arm, BP Patient Position: Sitting, BP Cuff Size: Large adult)   Pulse 80   Temp 98 °F (36.7 °C)   Resp 16   Ht 5' 9\" (1.753 m)   Wt 258 lb (117 kg)   SpO2 97%   BMI 38.10 kg/m²         General appearance: Patient is well-developed and well-nourished large girth and large boned gentleman in no apparent distress and well groomed.     Psych/mental health:  Affect: Appropriate    PHQ  3 most recent PHQ Screens 6/13/2022   Little interest or pleasure in doing things Not at all   Feeling down, depressed, irritable, or hopeless Not at all   Total Score PHQ 2 0   Trouble falling or staying asleep, or sleeping too much -   Feeling tired or having little energy -   Poor appetite, weight loss, or overeating -   Feeling bad about yourself - or that you are a failure or have let yourself or your family down -   Trouble concentrating on things such as school, work, reading, or watching TV -   Moving or speaking so slowly that other people could have noticed; or the opposite being so fidgety that others notice -   Thoughts of being better off dead, or hurting yourself in some way -   PHQ 9 Score -   How difficult have these problems made it for you to do your work, take care of your home and get along with others -       HEENT:   Normocephalic  With evidence of trauma: No  Full range of motion head neck: Yes  Tenderness to palpation of the head neck region: No      Cardiovascular:     Extremities warm to touch: Yes  Extremity swelling: No  Discoloration: No  Evidence of PVD: No    Respiratory:   Dyspnea on exertion: No   Abnormal effort on casual observation: No   Use of portable oxygen: No   Evidence of cyanosis: No     Musculoskeletal:   Evidence of significant bone deformities: No   Spinal curvature: No     Integumentary:    Obvious bruising: No   Lacerations or discoloration on casual observation: No       Neurological Examination:   Mental Status:        MMSE  No flowsheet data found. Formal testing was not completed    there was nothing concerning on general observation and discussion. Alert oriented and appropriate to general conversation  Normal processing on general observation  Followed conversation and responded seemingly appropriate throughout the office visit  No word finding difficulties noted on casual observation  Able to follow directions without difficulty     Cranial Nerves:    Grossly intact    Motor:   Normal bulk  No tremor appreciated on today's exam  No abnormal movements appreciated on today's exam  Moves extremities spontaneously and with purpose  5/5 x 4      Sensation: Intact to light touch    Coordination/Cerebellar:   Grossly intact    Gait: Ambulates independently    Reflexes: Diminished but symmetrical    Fall risk assessment  Fall Risk Assessment, last 12 mths 5/2/2022   Able to walk? Yes   Fall in past 12 months? 0   Do you feel unsteady?  0   Are you worried about falling 0                 Roxy Black MS, ANP-BC, [facility]

## 2022-06-13 NOTE — ASSESSMENT & PLAN NOTE
Has not traditionally done well with preventative medications  He is limited in his rescue medication secondary to cardiac and cerebrovascular issues subsequently triptans and ergotamine's are contraindicated    I renewed the Fioricet  I have prescribed Nicolás Daley for rescue as well hopefully will not address too many insurance barriers regarding this

## 2022-06-16 NOTE — TELEPHONE ENCOUNTER
Patient states needs new rx. He has increased his insulin to 20-40 units per meal because his sugars are running higher.

## 2022-06-21 ENCOUNTER — TELEPHONE (OUTPATIENT)
Dept: NEUROLOGY | Age: 42
End: 2022-06-21

## 2022-06-21 NOTE — TELEPHONE ENCOUNTER
Mary AMBROCIO sent to [facility] w/ notes. Maria Guadalupe Granados - [phone]    Status is pending.

## 2022-06-27 ENCOUNTER — TRANSCRIBE ORDER (OUTPATIENT)
Dept: CARDIAC REHAB | Age: 42
End: 2022-06-27

## 2022-06-27 DIAGNOSIS — Z95.5 STENTED CORONARY ARTERY: Primary | ICD-10-CM

## 2022-06-29 ENCOUNTER — OFFICE VISIT (OUTPATIENT)
Dept: ENDOCRINOLOGY | Age: 42
End: 2022-06-29
Payer: COMMERCIAL

## 2022-06-29 VITALS
WEIGHT: 259.2 LBS | BODY MASS INDEX: 38.39 KG/M2 | HEIGHT: 69 IN | SYSTOLIC BLOOD PRESSURE: 105 MMHG | DIASTOLIC BLOOD PRESSURE: 65 MMHG | HEART RATE: 72 BPM

## 2022-06-29 DIAGNOSIS — E29.1 HYPOGONADISM IN MALE: ICD-10-CM

## 2022-06-29 PROCEDURE — 99215 OFFICE O/P EST HI 40 MIN: CPT | Performed by: INTERNAL MEDICINE

## 2022-06-29 RX ORDER — LISINOPRIL 10 MG/1
10 TABLET ORAL DAILY
COMMUNITY
Start: 2022-06-24

## 2022-06-29 RX ORDER — TESTOSTERONE 20.25 MG/1.25G
GEL TOPICAL
Qty: 2 EACH | Refills: 5 | Status: SHIPPED | OUTPATIENT
Start: 2022-06-29

## 2022-06-29 RX ORDER — SYRINGE,INSUL U-500,NDL,0.5ML 31GX15/64"
SYRINGE, EMPTY DISPOSABLE MISCELLANEOUS
Qty: 300 SYRINGE | Refills: 3 | Status: SHIPPED | OUTPATIENT
Start: 2022-06-29

## 2022-06-29 NOTE — LETTER
6/29/2022    Patient: Keely Ivy   YOB: 1980   Date of Visit: 6/29/2022     James Martinez MD  [address]  [facility] [address]  [facility] [address]  [address] [place]    Dear James Martinez MD,      Thank you for referring Mr. Merlene Jett to [address] [place] [address] for evaluation. My notes for this consultation are attached. If you have questions, please do not hesitate to call me. I look forward to following your patient along with you.       Sincerely,    Estephanie Santiago MD

## 2022-07-11 ENCOUNTER — OFFICE VISIT (OUTPATIENT)
Dept: BEHAVIORAL/MENTAL HEALTH CLINIC | Age: 42
End: 2022-07-11
Payer: COMMERCIAL

## 2022-07-11 DIAGNOSIS — F43.29 ADJUSTMENT DISORDER WITH MIXED EMOTIONAL FEATURES: ICD-10-CM

## 2022-07-11 DIAGNOSIS — G47.00 INSOMNIA DISORDER WITH NON-SLEEP DISORDER MENTAL COMORBIDITY: ICD-10-CM

## 2022-07-11 DIAGNOSIS — F41.1 GAD (GENERALIZED ANXIETY DISORDER): Primary | ICD-10-CM

## 2022-07-11 DIAGNOSIS — F43.10 PTSD (POST-TRAUMATIC STRESS DISORDER): ICD-10-CM

## 2022-07-11 PROCEDURE — 99214 OFFICE O/P EST MOD 30 MIN: CPT | Performed by: NURSE PRACTITIONER

## 2022-07-11 RX ORDER — SERTRALINE HYDROCHLORIDE 100 MG/1
100 TABLET, FILM COATED ORAL DAILY
Qty: 30 TABLET | Refills: 2 | Status: SHIPPED | OUTPATIENT
Start: 2022-07-11 | End: 2022-10-13 | Stop reason: SDUPTHER

## 2022-07-11 RX ORDER — SERTRALINE HYDROCHLORIDE 25 MG/1
25 TABLET, FILM COATED ORAL DAILY
Qty: 30 TABLET | Refills: 2 | Status: SHIPPED | OUTPATIENT
Start: 2022-07-11 | End: 2022-10-13 | Stop reason: SDUPTHER

## 2022-07-11 NOTE — PROGRESS NOTES
CHIEF COMPLAINT:  Mariana Britt is a 36 y.o. male and was seen today for follow-up of psychiatric condition and psychotropic medication management. HPI:    Av Jean reports the following psychiatric symptoms by hx:  depression and anxiety. Overall symptoms have been present for years. Currently symptoms are is of moderate/high severity. Anxiety is exacerbated. The symptoms occur intermittently. Pt reports medications are effective. Precipitating factors: mother's illness, Alleviating factors: none Met with pt for appt today  to review current treatment plan. FAMILY/SOCIAL HX:   Psychosocial Stressors     REVIEW OF SYSTEMS:  Psychiatric symptoms being monitored for:  depression, anxiety   Appetite:good   Sleep: poor with DIS (difficulty initiating sleep)   Neuro: denies    Cardio: denies   There were no vitals taken for this visit. Side Effects:  none    MENTAL STATUS EXAM:   Sensorium  oriented to time, place and person   Relations cooperative   Appearance:  age appropriate and within normal Limits   Motor Behavior:  within normal limits   Speech:  normal pitch and normal volume   Thought Process: within normal limits   Thought Content free of delusions and free of hallucinations   Suicidal ideations none   Homicidal ideations none   Mood:  \"pretty good\"    Affect:  mood-congruent   Memory recent  adequate   Memory remote:  adequate   Concentration:  adequate   Abstraction:  abstract   Insight:  good   Reliability good   Judgment:  good         MEDICAL DECISION MAKING:  Problems addressed today:    ICD-10-CM ICD-9-CM    1. CESAR (generalized anxiety disorder)  F41.1 300.02 sertraline (ZOLOFT) 100 mg tablet      sertraline (ZOLOFT) 25 mg tablet   2. Insomnia disorder with non-sleep disorder mental comorbidity  G47.00 780.52    3. PTSD (post-traumatic stress disorder)  F43.10 309.81    4.  Adjustment disorder with mixed emotional features  F43.29 309.9          Assessment:   Av Jean is responding to treatment. Symptoms are less in severity and less in occurrence. Patient reports some GI disturbance. His PCP is aware. He endorses anxiety related to health condition and fear of MI. Discussed current medications and dosages. No changes made today. Reviewed treatment goals and target symptoms to monitor for. Reviewed benzo safety and policy. Reinforced positive coping strategies and discussed low fat diet. Will continue to monitor . Plan:   1. Current Outpatient Medications   Medication Sig Dispense Refill    sertraline (ZOLOFT) 100 mg tablet Take 1 Tablet by mouth daily. 30 Tablet 2    sertraline (ZOLOFT) 25 mg tablet Take 1 Tablet by mouth daily. 30 Tablet 2    lisinopriL (PRINIVIL, ZESTRIL) 10 mg tablet Take 10 mg by mouth daily.  testosterone (ANDROGEL) 20.25 mg/1.25 gram (1.62 %) gel Apply 4 pump presses daily. Ok to dispense generic testosterone. 2 Each 5    insulin U-500 CONCENTRATED regular (U-500) 500 unit/mL (3 mL) inpn subQ pen Take 100 units in the morning 100 units with lunch and 100 units with dinner. 12 mL 5    insulin CONCENTRATED regular (U-500 CONCENTRATED) 500 unit/mL soln 100 units in the morning 100 units with lunch and 100 units with dinner 60 mL 5    insulin U-500 syringe-needle (BD Insulin Syringe U-500) 1/2 mL 31 gauge x 15/64\" syrg Three shots per day. 300 Syringe 3    insulin regular (NOVOLIN R, HUMULIN R) 100 unit/mL injection Take 20-40 unit with each meal 3 times per day. (Patient taking differently: Take 12-40 unit with each meal 3 times per day.) 40 mL 2    ubrogepant (UBRELVY) 100 mg tablet Take 1 Tablet by mouth daily as needed for Migraine. Indications: a migraine headache (Patient not taking: Reported on 6/29/2022) 16 Tablet 12    butalbital-acetaminophen-caffeine (FIORICET, ESGIC) -40 mg per tablet Take 1 Tablet by mouth every six (6) hours as needed for Headache.  Indications: a migraine headache 10 Tablet 0    esomeprazole (NEXIUM) 40 mg capsule TAKE 1 CAPSULE BY MOUTH ONCE DAILY BEFORE BREAKFAST DO NOT OPEN CAPSULE      LORazepam (ATIVAN) 1 mg tablet Take 1 Tablet by mouth every eight (8) hours as needed for Anxiety. Max Daily Amount: 3 mg. 90 Tablet 0    levothyroxine (SYNTHROID) 112 mcg tablet TAKE 1 TABLET BY MOUTH ONCE DAILY BEFORE BREAKFAST 90 Tablet 3    busPIRone (BUSPAR) 15 mg tablet Take 1 Tablet by mouth three (3) times daily. 90 Tablet 2    metoprolol succinate (TOPROL-XL) 25 mg XL tablet Take 25 mg by mouth two (2) times a day.  rosuvastatin (CRESTOR) 40 mg tablet Take 40 mg by mouth daily.  lidocaine (XYLOCAINE) 2 % solution Take 15 mL by mouth as needed for Pain. Gargle and spit out to help with pain (Patient not taking: Reported on 5/2/2022) 1 Each 0    famotidine (PEPCID) 40 mg tablet Take 1 Tablet by mouth daily. 30 Tablet 3    insulin glargine (Lantus U-100 Insulin) 100 unit/mL injection INJECT 90 UNITS SUBCUTANEOUSLY IN THE MORNING AND 90 UNITS AT NIGHT 60 mL 5    clopidogreL (PLAVIX) 75 mg tab Take 1 Tablet by mouth daily. 30 Tablet 12    metFORMIN (GLUCOPHAGE) 500 mg tablet Take 1 Tablet by mouth two (2) times daily (with meals). 180 Tablet 3    cholecalciferol (Vitamin D3) (5000 Units/125 mcg) tab tablet Take 1 Tab by mouth daily. 90 Tab 1    Insulin Syringe-Needle U-100 (BD Insulin Syringe) 1 mL 25 x 1\" syrg Use for drawing up/injecting testosterone once weekly. 100 Each 3    aluminum & magnesium hydroxide-simethicone (Maalox Maximum Strength) 400-400-40 mg/5 mL suspension Take 10 mL by mouth every six (6) hours as needed for Indigestion.  nitroglycerin (NITROSTAT) 0.4 mg SL tablet Take 1 Tab by mouth every five (5) minutes as needed for Chest Pain. Sit down then put one tab under the tongue every 5 minutes as needed 1 Bottle 0    aspirin delayed-release 81 mg tablet Take 1 Tab by mouth daily. 30 Tab 0          medication changes made today: None, Continue previously prescribed meds     2.   Counseling and coordination of care including instructions for treatment, risks/benefits, risk factor reduction and patient/family education. He agrees with the plan. Patient instructed to call with any side effects, questions or issues.          7/11/2022  Kian Costa NP

## 2022-07-16 ENCOUNTER — HOSPITAL ENCOUNTER (EMERGENCY)
Age: 42
Discharge: HOME | End: 2022-07-16
Attending: EMERGENCY MEDICINE
Payer: COMMERCIAL

## 2022-07-16 VITALS
RESPIRATION RATE: 14 BRPM | HEIGHT: 69 IN | OXYGEN SATURATION: 99 % | DIASTOLIC BLOOD PRESSURE: 64 MMHG | SYSTOLIC BLOOD PRESSURE: 122 MMHG | BODY MASS INDEX: 38.3 KG/M2 | HEART RATE: 81 BPM | WEIGHT: 258.6 LBS | TEMPERATURE: 98.1 F

## 2022-07-16 DIAGNOSIS — H92.01 OTALGIA OF RIGHT EAR: Primary | ICD-10-CM

## 2022-07-16 DIAGNOSIS — H60.501 ACUTE OTITIS EXTERNA OF RIGHT EAR, UNSPECIFIED TYPE: ICD-10-CM

## 2022-07-16 PROCEDURE — 99283 EMERGENCY DEPT VISIT LOW MDM: CPT

## 2022-07-16 RX ORDER — NEOMYCIN SULFATE, POLYMYXIN B SULFATE AND HYDROCORTISONE 10; 3.5; 1 MG/ML; MG/ML; [USP'U]/ML
3 SUSPENSION/ DROPS AURICULAR (OTIC) 4 TIMES DAILY
Qty: 10 ML | Refills: 0 | Status: SHIPPED | OUTPATIENT
Start: 2022-07-16 | End: 2022-07-23

## 2022-07-16 RX ORDER — METHYLPREDNISOLONE 4 MG/1
TABLET ORAL
Qty: 1 DOSE PACK | Refills: 0 | Status: SHIPPED | OUTPATIENT
Start: 2022-07-16 | End: 2022-09-02

## 2022-07-16 NOTE — ED PROVIDER NOTES
EMERGENCY DEPARTMENT HISTORY AND PHYSICAL EXAM      Date: 7/16/2022  Patient Name: Ray Hawk    History of Presenting Illness     Chief Complaint   Patient presents with    Ear Pain     feeling sick for couple of days; last night right ear started hurting; now feels like someone has hit hm on right side of head; top jaw bone hurts to chew food; home Covid test negative yesterday       History Provided By: Patient    HPI: Ray Hakw, 36 y.o. male with PMHx of DM1, HTN, CAD, presents BIB self to the ED with cc of R otalgia x 2-3 days. The pain is described as like a pressure, felt at the right tragus and radiating around the right ear. Pain radiates to his right TMJ area and is worse with eating food. This is in the setting of recent cold/cough symptoms x1 week. COVID test at home was negative. Denies otorrhea, change in hearing, fever, significant nasal congestion, sore throat, difficulty swallowing. There are no other complaints, changes, or physical findings at this time. PCP: Fabian Simmons MD    No current facility-administered medications on file prior to encounter. Current Outpatient Medications on File Prior to Encounter   Medication Sig Dispense Refill    sertraline (ZOLOFT) 100 mg tablet Take 1 Tablet by mouth daily. 30 Tablet 2    sertraline (ZOLOFT) 25 mg tablet Take 1 Tablet by mouth daily. 30 Tablet 2    lisinopriL (PRINIVIL, ZESTRIL) 10 mg tablet Take 10 mg by mouth daily.  testosterone (ANDROGEL) 20.25 mg/1.25 gram (1.62 %) gel Apply 4 pump presses daily. Ok to dispense generic testosterone. 2 Each 5    insulin U-500 CONCENTRATED regular (U-500) 500 unit/mL (3 mL) inpn subQ pen Take 100 units in the morning 100 units with lunch and 100 units with dinner.  12 mL 5    insulin CONCENTRATED regular (U-500 CONCENTRATED) 500 unit/mL soln 100 units in the morning 100 units with lunch and 100 units with dinner 60 mL 5    insulin U-500 syringe-needle (BD Insulin Syringe U-500) 1/2 mL 31 gauge x 15/64\" syrg Three shots per day. 300 Syringe 3    insulin regular (NOVOLIN R, HUMULIN R) 100 unit/mL injection Take 20-40 unit with each meal 3 times per day. (Patient taking differently: Take 12-40 unit with each meal 3 times per day.) 40 mL 2    ubrogepant (UBRELVY) 100 mg tablet Take 1 Tablet by mouth daily as needed for Migraine. Indications: a migraine headache (Patient not taking: Reported on 6/29/2022) 16 Tablet 12    butalbital-acetaminophen-caffeine (FIORICET, ESGIC) -40 mg per tablet Take 1 Tablet by mouth every six (6) hours as needed for Headache. Indications: a migraine headache 10 Tablet 0    esomeprazole (NEXIUM) 40 mg capsule TAKE 1 CAPSULE BY MOUTH ONCE DAILY BEFORE BREAKFAST DO NOT OPEN CAPSULE      LORazepam (ATIVAN) 1 mg tablet Take 1 Tablet by mouth every eight (8) hours as needed for Anxiety. Max Daily Amount: 3 mg. 90 Tablet 0    levothyroxine (SYNTHROID) 112 mcg tablet TAKE 1 TABLET BY MOUTH ONCE DAILY BEFORE BREAKFAST 90 Tablet 3    busPIRone (BUSPAR) 15 mg tablet Take 1 Tablet by mouth three (3) times daily. 90 Tablet 2    metoprolol succinate (TOPROL-XL) 25 mg XL tablet Take 25 mg by mouth two (2) times a day.  rosuvastatin (CRESTOR) 40 mg tablet Take 40 mg by mouth daily.  lidocaine (XYLOCAINE) 2 % solution Take 15 mL by mouth as needed for Pain. Gargle and spit out to help with pain (Patient not taking: Reported on 5/2/2022) 1 Each 0    famotidine (PEPCID) 40 mg tablet Take 1 Tablet by mouth daily. 30 Tablet 3    insulin glargine (Lantus U-100 Insulin) 100 unit/mL injection INJECT 90 UNITS SUBCUTANEOUSLY IN THE MORNING AND 90 UNITS AT NIGHT 60 mL 5    clopidogreL (PLAVIX) 75 mg tab Take 1 Tablet by mouth daily. 30 Tablet 12    metFORMIN (GLUCOPHAGE) 500 mg tablet Take 1 Tablet by mouth two (2) times daily (with meals). 180 Tablet 3    cholecalciferol (Vitamin D3) (5000 Units/125 mcg) tab tablet Take 1 Tab by mouth daily.  90 Tab 1    Insulin Syringe-Needle U-100 (BD Insulin Syringe) 1 mL 25 x 1\" syrg Use for drawing up/injecting testosterone once weekly. 100 Each 3    aluminum & magnesium hydroxide-simethicone (Maalox Maximum Strength) 400-400-40 mg/5 mL suspension Take 10 mL by mouth every six (6) hours as needed for Indigestion.  nitroglycerin (NITROSTAT) 0.4 mg SL tablet Take 1 Tab by mouth every five (5) minutes as needed for Chest Pain. Sit down then put one tab under the tongue every 5 minutes as needed 1 Bottle 0    aspirin delayed-release 81 mg tablet Take 1 Tab by mouth daily.  27 Tab 0       Past History     Past Medical History:  Past Medical History:   Diagnosis Date    Anxiety and depression     Bleeding of eye, left     8/17/21 pt reports receiving injections in right eye for beeding    Chronic headaches 2007    COVID-19 12/20/2020    Diabetes type 1, uncontrolled     since 8years old    GERD (gastroesophageal reflux disease)     Hypercholesterolemia     Hypertension     Hypothyroidism     MI (myocardial infarction) ([facility] [address]     as of 8/17/21 pt reports 8 stents total    WALLY on CPAP        Past Surgical History:  Past Surgical History:   Procedure Laterality Date    HX CARPAL TUNNEL RELEASE Left 2012    HX CARPAL TUNNEL RELEASE Right 2018    CTE trigger thumb and index finger    HX HEART CATHETERIZATION      HX HEENT      HX LAP CHOLECYSTECTOMY  8/26/14    Dr Annika Mas    HX WISDOM TEETH EXTRACTION         Family History:  Family History   Problem Relation Age of Onset    Diabetes Mother     Hypertension Mother     Heart Disease Father     Cancer Father     Diabetes Father     Other Father         MAC    Diabetes Paternal Aunt     Diabetes Maternal Grandmother     Thyroid Cancer Maternal Grandmother     Kidney Disease Maternal Grandmother     Arthritis Maternal Grandmother     Heart Disease Maternal Grandfather     High Cholesterol Maternal Grandfather     Hypertension Maternal Grandfather     Diabetes Paternal Grandmother     Hemophilia Paternal Grandfather        Social History:  Social History     Tobacco Use    Smoking status: Former Smoker     Packs/day: 0.00     Years: 14.00     Pack years: 0.00     Quit date: 2014     Years since quittin.5    Smokeless tobacco: Never Used   Vaping Use    Vaping Use: Never used   Substance Use Topics    Alcohol use: No    Drug use: No       Allergies: Allergies   Allergen Reactions    Morphine Nausea and Vomiting    Penicillin G Swelling    Percocet [Oxycodone-Acetaminophen] Hives and Nausea and Vomiting     Pt is allergic to Oxycodone, has previously tolerated Tylenol and Hydrocodone.  Sulfa (Sulfonamide Antibiotics) Swelling    Tramadol Nausea Only     Nausea and headache           Review of Systems   Review of Systems   Constitutional: Negative for fever. HENT: Positive for ear pain. Negative for ear discharge, hearing loss, sore throat, trouble swallowing and voice change. Respiratory: Positive for cough. Negative for shortness of breath. Cardiovascular: Negative for chest pain. Neurological: Negative for dizziness. All other systems reviewed and are negative. Physical Exam   Physical Exam  Vitals and nursing note reviewed. Constitutional:       General: He is not in acute distress. Appearance: Normal appearance. He is not toxic-appearing. HENT:      Head: Normocephalic and atraumatic. Right Ear: Hearing, tympanic membrane and external ear normal. Tympanic membrane is not erythematous or retracted. Left Ear: Hearing, tympanic membrane and external ear normal.      Ears:      Comments: Mild TTP over the right tragus. Pain reported inside the ear on movement of the right pinna. No mastoid tenderness. Nose: Nose normal.      Mouth/Throat:      Mouth: Mucous membranes are moist.   Eyes:      Extraocular Movements: Extraocular movements intact.       Conjunctiva/sclera: Conjunctivae normal.      Pupils: Pupils are equal, round, and reactive to light. Neck:      Trachea: Phonation normal.   Cardiovascular:      Rate and Rhythm: Normal rate and regular rhythm. Pulmonary:      Effort: Pulmonary effort is normal.      Breath sounds: Normal breath sounds. Abdominal:      Palpations: Abdomen is soft. Tenderness: There is no abdominal tenderness. There is no guarding. Musculoskeletal:         General: Normal range of motion. Cervical back: Normal range of motion and neck supple. Skin:     General: Skin is warm and dry. Neurological:      General: No focal deficit present. Mental Status: He is alert and oriented to person, place, and time. GCS: GCS eye subscore is 4. GCS verbal subscore is 5. GCS motor subscore is 6. Psychiatric:         Mood and Affect: Mood normal.         Behavior: Behavior normal. Behavior is cooperative. Diagnostic Study Results     Labs -   No results found for this or any previous visit (from the past 12 hour(s)). Radiologic Studies -   No orders to display     CT Results  (Last 48 hours)    None        CXR Results  (Last 48 hours)    None            Medical Decision Making   I am the first provider for this patient. I reviewed the vital signs, available nursing notes, past medical history, past surgical history, family history and social history. Vital Signs-Reviewed the patient's vital signs. Patient Vitals for the past 12 hrs:   Temp Pulse Resp BP SpO2   07/16/22 1746 98.1 °F (36.7 °C) 81 14 122/64 99 %       Records Reviewed: Nursing Notes and Old Medical Records    Provider Notes (Medical Decision Making):   Patient has mild tenderness on palpation of the tragus and manipulation of the pinna, exam is otherwise unremarkable. Discussed DDx of early otitis externa, TMJ arthritis, eustachian tube dysfunction. Will treat with antibiotic eardrops and Medrol Dosepak.   Recommended close attention to blood sugars while on steroids due to presence of diabetes. Follow-up with PCP. The patient is in agreement this plan. ED Course:   Initial assessment performed. The patients presenting problems have been discussed, and they are in agreement with the care plan formulated and outlined with them. I have encouraged them to ask questions as they arise throughout their visit. Critical Care Time: None    Disposition:  dc    PLAN:  1. Discharge Medication List as of 7/16/2022  6:33 PM      START taking these medications    Details   neomycin-polymyxin-hydrocortisone, buffered, (PEDIOTIC) 3.5-10,000-1 mg/mL-unit/mL-% otic suspension Administer 3 Drops in right ear four (4) times daily for 7 days. , Normal, Disp-10 mL, R-0      methylPREDNISolone (Medrol, Brad,) 4 mg tablet Take as directed on package, Normal, Disp-1 Dose Pack, R-0         CONTINUE these medications which have NOT CHANGED    Details   !! sertraline (ZOLOFT) 100 mg tablet Take 1 Tablet by mouth daily. , Normal, Disp-30 Tablet, R-2Changed number of refills      !! sertraline (ZOLOFT) 25 mg tablet Take 1 Tablet by mouth daily. , Normal, Disp-30 Tablet, R-2Include 2 refills      lisinopriL (PRINIVIL, ZESTRIL) 10 mg tablet Take 10 mg by mouth daily. , Historical Med      testosterone (ANDROGEL) 20.25 mg/1.25 gram (1.62 %) gel Apply 4 pump presses daily.  Ok to dispense generic testosterone., Normal, Disp-2 Each, R-5      insulin U-500 CONCENTRATED regular (U-500) 500 unit/mL (3 mL) inpn subQ pen Take 100 units in the morning 100 units with lunch and 100 units with dinner., Print, Disp-12 mL, R-5      insulin CONCENTRATED regular (U-500 CONCENTRATED) 500 unit/mL soln 100 units in the morning 100 units with lunch and 100 units with dinner, Print, Disp-60 mL, R-5      insulin U-500 syringe-needle (BD Insulin Syringe U-500) 1/2 mL 31 gauge x 15/64\" syrg Three shots per day., Print, Disp-300 Syringe, R-3      insulin regular (NOVOLIN R, HUMULIN R) 100 unit/mL injection Take 20-40 unit with each meal 3 times per day., Normal, Disp-40 mL, R-2      ubrogepant (UBRELVY) 100 mg tablet Take 1 Tablet by mouth daily as needed for Migraine. Indications: a migraine headache, Normal, Disp-16 Tablet, R-12      butalbital-acetaminophen-caffeine (FIORICET, ESGIC) -40 mg per tablet Take 1 Tablet by mouth every six (6) hours as needed for Headache. Indications: a migraine headache, Normal, Disp-10 Tablet, R-0      esomeprazole (NEXIUM) 40 mg capsule TAKE 1 CAPSULE BY MOUTH ONCE DAILY BEFORE BREAKFAST DO NOT OPEN CAPSULE, Historical Med      LORazepam (ATIVAN) 1 mg tablet Take 1 Tablet by mouth every eight (8) hours as needed for Anxiety. Max Daily Amount: 3 mg., Normal, Disp-90 Tablet, R-0      levothyroxine (SYNTHROID) 112 mcg tablet TAKE 1 TABLET BY MOUTH ONCE DAILY BEFORE BREAKFAST, Normal, Disp-90 Tablet, R-3      busPIRone (BUSPAR) 15 mg tablet Take 1 Tablet by mouth three (3) times daily. , Normal, Disp-90 Tablet, R-2      metoprolol succinate (TOPROL-XL) 25 mg XL tablet Take 25 mg by mouth two (2) times a day., Historical Med      rosuvastatin (CRESTOR) 40 mg tablet Take 40 mg by mouth daily. , Historical Med      lidocaine (XYLOCAINE) 2 % solution Take 15 mL by mouth as needed for Pain. Gargle and spit out to help with pain, Normal, Disp-1 Each, R-0      famotidine (PEPCID) 40 mg tablet Take 1 Tablet by mouth daily. , Normal, Disp-30 Tablet, R-3      insulin glargine (Lantus U-100 Insulin) 100 unit/mL injection INJECT 90 UNITS SUBCUTANEOUSLY IN THE MORNING AND 90 UNITS AT NIGHT, Normal, Disp-60 mL, R-5      clopidogreL (PLAVIX) 75 mg tab Take 1 Tablet by mouth daily. , Normal, Disp-30 Tablet, R-12      metFORMIN (GLUCOPHAGE) 500 mg tablet Take 1 Tablet by mouth two (2) times daily (with meals). , Normal, Disp-180 Tablet, R-3      cholecalciferol (Vitamin D3) (5000 Units/125 mcg) tab tablet Take 1 Tab by mouth daily. , Normal, Disp-90 Tab, R-1      Insulin Syringe-Needle U-100 (BD Insulin Syringe) 1 mL 25 x 1\" syrg Use for drawing up/injecting testosterone once weekly. , Normal, Disp-100 Each,R-3      aluminum & magnesium hydroxide-simethicone (Maalox Maximum Strength) 400-400-40 mg/5 mL suspension Take 10 mL by mouth every six (6) hours as needed for Indigestion. , Historical Med      nitroglycerin (NITROSTAT) 0.4 mg SL tablet Take 1 Tab by mouth every five (5) minutes as needed for Chest Pain. Sit down then put one tab under the tongue every 5 minutes as needed, Normal, Disp-1 Bottle, R-0      aspirin delayed-release 81 mg tablet Take 1 Tab by mouth daily. , Print, Disp-30 Tab, R-0       !! - Potential duplicate medications found. Please discuss with provider. 2.   Follow-up Information     Follow up With Specialties Details Why Contact Info    [facility] EMERGENCY DEPT Emergency Medicine  As needed, If symptoms worsen [address]  [address]   [address] [facility]    Elsie Greene MD Internal Medicine Physician Call  For follow up [address] [place] [address]  [phone]          Return to ED if worse     Diagnosis     Clinical Impression:   1. Otalgia of right ear    2. Acute otitis externa of right ear, unspecified type          Please note that this dictation was completed with [employer], the computer voice recognition software. Quite often unanticipated grammatical, syntax, homophones, and other interpretive errors are inadvertently transcribed by the computer software. Please disregards these errors. Please excuse any errors that have escaped final proofreading.

## 2022-07-16 NOTE — ED NOTES
I have reviewed discharge instructions with the patient. The patient verbalized understanding. Patient ambulatory out of ED at this time. Made aware of prescription to be picked up at pharmacy.

## 2022-07-18 ENCOUNTER — HOSPITAL ENCOUNTER (OUTPATIENT)
Dept: CARDIAC REHAB | Age: 42
Discharge: HOME | End: 2022-07-18
Payer: COMMERCIAL

## 2022-07-18 VITALS — WEIGHT: 261.4 LBS | HEIGHT: 69 IN | BODY MASS INDEX: 38.72 KG/M2

## 2022-07-18 PROCEDURE — 93798 PHYS/QHP OP CAR RHAB W/ECG: CPT

## 2022-07-18 NOTE — CARDIO/PULMONARY
[facility]    Cardiac Rehabilitation Program    Intake Appointment  2022           NAME: Barbara Land : 1980 AGE: 36 y.o. GENDER: male    CARDIAC REHAB ADMITTING DIAGNOSIS: Presence of coronary angioplasty implant and graft [Z95.5]    REFERRING PHYSICIAN: Maria Guadalupe Daniels MD    MEDICAL HX:  Past Medical History:   Diagnosis Date    Anxiety and depression     anxiety, depression    Bleeding of eye, left     21 pt reports receiving injections in right eye for beeding    Chronic headaches     COVID-19 2020    Diabetes type 1, uncontrolled     since 8years old    GERD (gastroesophageal reflux disease)     Hypercholesterolemia     Hypertension     Hypothyroidism     MI (myocardial infarction) ([address]     as of 21 pt reports 8 stents total    WALLY on CPAP        LABS:     Lab Results   Component Value Date/Time    Hemoglobin A1c 9.9 (H) 10/07/2021 01:50 PM    Hemoglobin A1c (POC) 9.1 2022 11:36 AM    Hemoglobin A1c, External 10.1 2022 12:00 AM     Lab Results   Component Value Date/Time    Cholesterol, total 142 10/07/2021 01:50 PM    HDL Cholesterol 35 10/07/2021 01:50 PM    LDL,Direct 151 (H) 2014 02:16 AM    LDL, calculated 62.4 10/07/2021 01:50 PM    VLDL, calculated 44.6 10/07/2021 01:50 PM    Triglyceride 223 (H) 10/07/2021 01:50 PM    CHOL/HDL Ratio 4.1 10/07/2021 01:50 PM         MEDICATIONS/SUPPLEMENTS:     Prior to Admission medications    Medication Sig Start Date End Date Taking? Authorizing Provider   neomycin-polymyxin-hydrocortisone, buffered, (PEDIOTIC) 3.5-10,000-1 mg/mL-unit/mL-% otic suspension Administer 3 Drops in right ear four (4) times daily for 7 days. 22  LOLLY Reina   methylPREDNISolone (Medrol, Brad,) 4 mg tablet Take as directed on package 22   LOLLY Reina   sertraline (ZOLOFT) 100 mg tablet Take 1 Tablet by mouth daily.  22   Margie Gonzalez NP sertraline (ZOLOFT) 25 mg tablet Take 1 Tablet by mouth daily. 7/11/22   Avelino Fernandez NP   lisinopriL (PRINIVIL, ZESTRIL) 10 mg tablet Take 10 mg by mouth daily. 6/24/22   Provider, Historical   testosterone (ANDROGEL) 20.25 mg/1.25 gram (1.62 %) gel Apply 4 pump presses daily. Ok to dispense generic testosterone. 6/29/22   Karolyn Glass MD   insulin U-500 CONCENTRATED regular (U-500) 500 unit/mL (3 mL) inpn subQ pen Take 100 units in the morning 100 units with lunch and 100 units with dinner. 6/29/22   Karolyn Glass MD   insulin CONCENTRATED regular (U-500 CONCENTRATED) 500 unit/mL soln 100 units in the morning 100 units with lunch and 100 units with dinner 6/29/22   Karolyn Glass MD   insulin U-500 syringe-needle (BD Insulin Syringe U-500) 1/2 mL 31 gauge x 15/64\" syrg Three shots per day. 6/29/22   Karolyn Glass MD   insulin regular (NOVOLIN R, HUMULIN R) 100 unit/mL injection Take 20-40 unit with each meal 3 times per day. Patient taking differently: Take 12-40 unit with each meal 3 times per day. 6/16/22   Karolyn Glass MD   ubrogepant (UBRELVY) 100 mg tablet Take 1 Tablet by mouth daily as needed for Migraine. Indications: a migraine headache  Patient not taking: Reported on 6/29/2022 6/13/22   Mercedes White NP   butalbital-acetaminophen-caffeine (FIORICET, ESGIC) -40 mg per tablet Take 1 Tablet by mouth every six (6) hours as needed for Headache. Indications: a migraine headache 6/13/22   Mercedes White NP   esomeprazole (NEXIUM) 40 mg capsule TAKE 1 CAPSULE BY MOUTH ONCE DAILY BEFORE BREAKFAST DO NOT OPEN CAPSULE 4/27/22   Provider, Historical   LORazepam (ATIVAN) 1 mg tablet Take 1 Tablet by mouth every eight (8) hours as needed for Anxiety.  Max Daily Amount: 3 mg. 5/26/22   Avelino Fernandez NP   levothyroxine (SYNTHROID) 112 mcg tablet TAKE 1 TABLET BY MOUTH ONCE DAILY BEFORE BREAKFAST 5/23/22   Karolyn Glass MD   busPIRone (BUSPAR) 15 mg tablet Take 1 Tablet by mouth three (3) times daily. 5/6/22   Viktor Fernandez NP   metoprolol succinate (TOPROL-XL) 25 mg XL tablet Take 25 mg by mouth two (2) times a day. Provider, Historical   rosuvastatin (CRESTOR) 40 mg tablet Take 40 mg by mouth daily. 2/16/22   Provider, Historical   lidocaine (XYLOCAINE) 2 % solution Take 15 mL by mouth as needed for Pain. Gargle and spit out to help with pain  Patient not taking: Reported on 5/2/2022 2/1/22   LOLLY Mc   famotidine (PEPCID) 40 mg tablet Take 1 Tablet by mouth daily. 1/19/22   Dari Hurst MD   insulin glargine (Lantus U-100 Insulin) 100 unit/mL injection INJECT 90 UNITS SUBCUTANEOUSLY IN THE MORNING AND 90 UNITS AT NIGHT 12/20/21   Oniel Smalls MD   clopidogreL (PLAVIX) 75 mg tab Take 1 Tablet by mouth daily. 10/29/21   Zi Kumar MD   metFORMIN (GLUCOPHAGE) 500 mg tablet Take 1 Tablet by mouth two (2) times daily (with meals). 10/1/21   Oniel Smalls MD   cholecalciferol (Vitamin D3) (5000 Units/125 mcg) tab tablet Take 1 Tab by mouth daily. 3/10/21   Dari Hurst MD   Insulin Syringe-Needle U-100 (BD Insulin Syringe) 1 mL 25 x 1\" syrg Use for drawing up/injecting testosterone once weekly. 12/7/20   Oniel Smalls MD   aluminum & magnesium hydroxide-simethicone (Maalox Maximum Strength) 400-400-40 mg/5 mL suspension Take 10 mL by mouth every six (6) hours as needed for Indigestion. Provider, Historical   nitroglycerin (NITROSTAT) 0.4 mg SL tablet Take 1 Tab by mouth every five (5) minutes as needed for Chest Pain. Sit down then put one tab under the tongue every 5 minutes as needed 12/4/19   Fletcher Savage MD   aspirin delayed-release 81 mg tablet Take 1 Tab by mouth daily.  9/24/19   Regine Verduzco MD       ANTHROPOMETRICS:      Ht Readings from Last 1 Encounters:   07/16/22 5' 9\" (1.753 m)      Wt Readings from Last 1 Encounters:   07/16/22 117.3 kg (258 lb 9.6 oz)        WAIST:      SCREENING SCORES: Farhan:    Naomi:    Rate Your Plate:    Cardiac Knowledge:    PHQ-9:         VISIT SUMMARY:    Love Oreilly 36 y.o. presented to [facility] for program orientation and 6 minute walk test today with a primary diagnosis of Presence of coronary angioplasty implant and graft [Z95.5]. Cardiac risk factors include smoking/ tobacco exposure, family history, dyslipidemia, diabetes mellitus, obesity, hypertension, prior MI, thyroid dysfunction. EF is 60%. Love Oreilly is (not) . PHQ9, depression score, is  10 and this is considered moderate. The result was discussed with patient who affirms score to be accurate and his PCP (Dr. Rosalia Hamm was notified of score. Patient denied chest pain or SOB during 6 minute walk test and was in NSR/BBB. Patient walked 240 meters at a speed of 1.5 and grade of 0.0 for a final MET level of 2.1Exercise prescription developed around patient stated goals, to be supplemented with home exercise recommendations. Education manual given to patient and reviewed. Patient will attend cardiac rehab 2-3 times/week and also plans to participate in educational classes.         Aylin Sheppadr

## 2022-07-20 ENCOUNTER — HOSPITAL ENCOUNTER (OUTPATIENT)
Dept: CARDIAC REHAB | Age: 42
Discharge: HOME | End: 2022-07-20
Payer: COMMERCIAL

## 2022-07-20 VITALS — WEIGHT: 258.8 LBS | BODY MASS INDEX: 38.22 KG/M2

## 2022-07-20 PROCEDURE — 93798 PHYS/QHP OP CAR RHAB W/ECG: CPT

## 2022-07-21 ENCOUNTER — TELEPHONE (OUTPATIENT)
Dept: ENDOCRINOLOGY | Age: 42
End: 2022-07-21

## 2022-07-21 NOTE — TELEPHONE ENCOUNTER
Dr. Ike Obregon,    What are the correct directions for the Humulin R insulin.  The prescription states 2 different directions

## 2022-07-21 NOTE — TELEPHONE ENCOUNTER
7/21/2022    Ramya called from [address] and left a vm at 9:28 am stating they are calling to verify the instructions on pt's [place]. They can be reached at [phone].     Thanks,   Cheli Rai

## 2022-07-25 ENCOUNTER — HOSPITAL ENCOUNTER (OUTPATIENT)
Dept: CARDIAC REHAB | Age: 42
Discharge: HOME | End: 2022-07-25
Payer: COMMERCIAL

## 2022-07-25 VITALS — BODY MASS INDEX: 38.42 KG/M2 | WEIGHT: 260.2 LBS

## 2022-07-25 PROCEDURE — 93797 PHYS/QHP OP CAR RHAB WO ECG: CPT

## 2022-07-25 PROCEDURE — 93798 PHYS/QHP OP CAR RHAB W/ECG: CPT

## 2022-07-25 NOTE — CARDIO/PULMONARY
Cardiac Rehab Nutrition Assessment - 1:1 Evaluation         NAME: Anni Serrato : 1980 AGE: 36 y.o. GENDER: male  CARDIAC REHAB ADMITTING DIAGNOSIS: s/p stent x8, then an additional stent     PROBLEM LIST:  Patient Active Problem List   Diagnosis Code    DM (diabetes mellitus) ([facility] [place] [facility] E11.9    Acquired hypothyroidism E03.9    Essential hypertension I10    Fibromyalgia M79.7    Microalbuminuria R80.9    Anxiety F41.9    Migraine without aura G43.009    Hypertriglyceridemia E78.1    Severe obesity ([facility]) E66.01    Transient ischemic attack involving left internal carotid artery G45. 1    Chest pain R07.9    Unstable angina ([facility]) I20.0    Coronary artery disease involving native coronary artery of native heart with unstable angina pectoris ([facility]) I25.110    Type 2 diabetes with nephropathy ([facility]) E11.21    Dysthymia F34.1    Adjustment disorder with mixed emotional features F43.29    Anxiety disorder due to general medical condition with panic attack F06.4, F41.0    Insomnia disorder with non-sleep disorder mental comorbidity G47.00    Dizzy spells R42    Multiple lacunar infarcts ([facility]) I63.81    Cervical spondylosis with radiculopathy M47.22    Low back pain M54.50    Corneal abrasion, right S05. 01XA    Stable proliferative diabetic retinopathy of both eyes associated with type 1 diabetes mellitus ([facility] [place] [facility] I42.0465    Bilateral carotid artery stenosis I65.23    Gall bladder removed 10 years afo    LABS:   Lab Results   Component Value Date/Time    Hemoglobin A1c 9.9 (H) 10/07/2021 01:50 PM    Hemoglobin A1c (POC) 9.1 2022 11:36 AM    Hemoglobin A1c, External 10.1 2022 12:00 AM     Lab Results   Component Value Date/Time    Cholesterol, total 142 10/07/2021 01:50 PM    HDL Cholesterol 35 10/07/2021 01:50 PM    LDL,Direct 151 (H) 2014 02:16 AM    LDL, calculated 62.4 10/07/2021 01:50 PM    VLDL, calculated 44.6 10/07/2021 01:50 PM    Triglyceride 223 (H) 10/07/2021 01:50 PM    CHOL/HDL Ratio 4.1 10/07/2021 01:50 PM         MEDICATIONS/SUPPLEMENTS:   Prior to Admission medications    Medication Sig Start Date End Date Taking? Authorizing Provider   insulin regular (NOVOLIN R, HUMULIN R) 100 unit/mL injection Take 12-40 unit with each meal 3 times per day. 7/21/22   Oniel Smalls MD   methylPREDNISolone (Medrol, Brad,) 4 mg tablet Take as directed on package  Patient not taking: Reported on 7/18/2022 7/16/22   LOLLY Sanderson   sertraline (ZOLOFT) 100 mg tablet Take 1 Tablet by mouth daily. 7/11/22   Viktor Fernandez NP   sertraline (ZOLOFT) 25 mg tablet Take 1 Tablet by mouth daily. 7/11/22   Viktor Fernandez NP   lisinopriL (PRINIVIL, ZESTRIL) 10 mg tablet Take 10 mg by mouth daily. 6/24/22   Provider, Historical   testosterone (ANDROGEL) 20.25 mg/1.25 gram (1.62 %) gel Apply 4 pump presses daily. Ok to dispense generic testosterone. 6/29/22   Oniel Smalls MD   insulin U-500 syringe-needle (BD Insulin Syringe U-500) 1/2 mL 31 gauge x 15/64\" syrg Three shots per day. 6/29/22   Oniel Smalls MD   ubrogepant (UBRELVY) 100 mg tablet Take 1 Tablet by mouth daily as needed for Migraine. Indications: a migraine headache  Patient not taking: Reported on 6/29/2022 6/13/22   El Caraballo NP   butalbital-acetaminophen-caffeine (FIORICET, ESGIC) -40 mg per tablet Take 1 Tablet by mouth every six (6) hours as needed for Headache. Indications: a migraine headache  Patient not taking: Reported on 7/18/2022 6/13/22   El Caraballo NP   esomeprazole (NEXIUM) 40 mg capsule TAKE 1 CAPSULE BY MOUTH ONCE DAILY BEFORE BREAKFAST DO NOT OPEN CAPSULE 4/27/22   Provider, Historical   LORazepam (ATIVAN) 1 mg tablet Take 1 Tablet by mouth every eight (8) hours as needed for Anxiety.  Max Daily Amount: 3 mg. 5/26/22   Viktor Fernandez NP   levothyroxine (SYNTHROID) 112 mcg tablet TAKE 1 TABLET BY MOUTH ONCE DAILY BEFORE BREAKFAST 5/23/22   Oniel Smalls MD   busPIRone (BUSPAR) 15 mg tablet Take 1 Tablet by mouth three (3) times daily. 5/6/22   Gila Fernandez [facility], [facility]   metoprolol succinate (TOPROL-XL) 25 mg XL tablet Take 25 mg by mouth two (2) times a day. Provider, Historical   rosuvastatin (CRESTOR) 40 mg tablet Take 40 mg by mouth daily. 2/16/22   Provider, Historical   lidocaine (XYLOCAINE) 2 % solution Take 15 mL by mouth as needed for Pain. Gargle and spit out to help with pain  Patient not taking: Reported on 5/2/2022 2/1/22   LOLLY Shoemaker   famotidine (PEPCID) 40 mg tablet Take 1 Tablet by mouth daily. 1/19/22   Bbo Hurst MD   insulin glargine (Lantus U-100 Insulin) 100 unit/mL injection INJECT 90 UNITS SUBCUTANEOUSLY IN THE MORNING AND 90 UNITS AT NIGHT 12/20/21   Donte Ken MD   clopidogreL (PLAVIX) 75 mg tab Take 1 Tablet by mouth daily. 10/29/21   Mary Hamilton MD   metFORMIN (GLUCOPHAGE) 500 mg tablet Take 1 Tablet by mouth two (2) times daily (with meals). 10/1/21   Donte Ken MD   cholecalciferol (Vitamin D3) (5000 Units/125 mcg) tab tablet Take 1 Tab by mouth daily. 3/10/21   Bob Hurst MD   Insulin Syringe-Needle U-100 (BD Insulin Syringe) 1 mL 25 x 1\" syrg Use for drawing up/injecting testosterone once weekly. 12/7/20   Donte Ken MD   aluminum & magnesium hydroxide-simethicone (Maalox Maximum Strength) 400-400-40 mg/5 mL suspension Take 10 mL by mouth every six (6) hours as needed for Indigestion. Provider, Historical   nitroglycerin (NITROSTAT) 0.4 mg SL tablet Take 1 Tab by mouth every five (5) minutes as needed for Chest Pain. Sit down then put one tab under the tongue every 5 minutes as needed 12/4/19   Charlie Fields MD   aspirin delayed-release 81 mg tablet Take 1 Tab by mouth daily.  9/24/19   Regine Verduzco MD         ANTHROPOMETRICS:    Ht Readings from Last 1 Encounters:   07/18/22 5' 9\" (1.753 m)      Wt Readings from Last 10 Encounters:   07/20/22 117.4 kg (258 lb 12.8 oz)   07/18/22 118.6 kg (261 lb 6.4 oz)   07/16/22 117.3 kg (258 lb 9.6 oz)   06/29/22 117.6 kg (259 lb 3.2 oz)   06/13/22 117 kg (258 lb)   05/26/22 115.2 kg (254 lb)   05/02/22 116.1 kg (256 lb)   04/28/22 117.4 kg (258 lb 12.8 oz)   04/14/22 116.4 kg (256 lb 9.9 oz)   04/09/22 118.9 kg (262 lb 2 oz)         BMI: 38.42 kg/M2 Category: obese (class I)  Waist: 50 inches    Reported Wt Hx:     Reported Diet Hx: Pt has always loved salads/greens; fruit. Pt had been snacking frequently and drinking 6-8 diet sodas/day prior to stents. Pt hs been experiencing stomach issues since surgery; state he feels full after consuming even small portions. Yesterday, pt consumed a mini-peanut butter/jelly and cheeseburger and felt extremely bloated/taut. Denies bloating after eating salads/veggies. Pt notes his gall bladder was removed ~10 years ago, suspect stomach issues exacerbated with consumption of high fat foods. Rate Your Plate Score: 41  (Score 58-72: Making many healthy choices; 41-57: Some choices need improving 24-40: many choices need improving)    24 HOUR DIET RECALL  Breakfast    Lunch    Dinner    Snacks    Beverages No sugar gatorade or unsweet tea or diet soda     Kendall Demarco     Environmental/Social: Significant other; Lives with mother who is also a current patient at [facility]. NUTRITION INTERVENTION:  Nutrition 60 minute one-on-one education & goal setting with Jose Flores with Kendall Demarco relevant labs compared to ideals. Reviewed weight history and patient's verbalized weight goal as well as any real or perceived barriers to obtaining the goal. Collaborated with patient to set a specific short and long term weight goal.     Reviewed Rate Your Plate and conducted a verbal diet recall.   Assessed for environmental, financial, psychosocial, physical and comorbidities that may impact the food and eating patterns / behaviors of [address] with patient to set specific nutrient goals as well as specific food / behavior changes that will help patient meet the overall goal of following a heart healthy eating pattern (using guidelines as set forth by the American Heart Association and modeled after healthful eating patterns as recognized by the USDA Dietary Guidelines such as DASH, Mediterranean or plant-based). Briefly reviewed with Kendall Demarco the nutrition information in the Cardiac Rehab patient education book and encouraged Kendall Demarco to read thoroughly, ask questions as needed, and use for future reference for heart healthy nutrition information. Kendall Demarco is scheduled to participate in Cardiac Rehab group nutrition classes. PATIENT GOALS:    Weight Goals: 230#    Nutrition Goals:  Daily Recommendations:  Calories: 2000 /day  (using Didi Fitch with AF 1.2-500)    Saturated Fat: no more than 11 g/day  Trans Fat: 0 g/day  Sodium: no more than 2565-4293 mg/day  Fruit: 2 cups / day  Vegetables: 2-3 cups/day    Other:  - read and compare food labels  - make veg main part of meal (fill 1/2 plate at most meals)  - choose lean protein  - increase water consumption/unsweetened tea while limiting diet soda      Keeping a food diary was recommended. Questions addressed. Follow-up plans discussed. Kendall Demarco verbalized understanding.             Gerard Ahuja RD

## 2022-07-27 ENCOUNTER — APPOINTMENT (OUTPATIENT)
Dept: CARDIAC REHAB | Age: 42
End: 2022-07-27
Payer: COMMERCIAL

## 2022-08-01 ENCOUNTER — APPOINTMENT (OUTPATIENT)
Dept: CARDIAC REHAB | Age: 42
End: 2022-08-01
Payer: MEDICAID

## 2022-08-02 ENCOUNTER — TELEPHONE (OUTPATIENT)
Dept: ENDOCRINOLOGY | Age: 42
End: 2022-08-02

## 2022-08-02 RX ORDER — PEN NEEDLE, DIABETIC 31 GX3/16"
NEEDLE, DISPOSABLE MISCELLANEOUS
Qty: 500 PEN NEEDLE | Refills: 3 | Status: SHIPPED | OUTPATIENT
Start: 2022-08-02

## 2022-08-02 NOTE — TELEPHONE ENCOUNTER
Fax received from [address] requesting that a prescription for pen needles be sent in to go along with insulin pen prescription; patient does not want to pay out of pocket.     Latasha Cuellar LPN

## 2022-08-03 ENCOUNTER — TELEPHONE (OUTPATIENT)
Dept: ENDOCRINOLOGY | Age: 42
End: 2022-08-03

## 2022-08-03 NOTE — TELEPHONE ENCOUNTER
8/3/2022  4:01 PM    Roge Vázquez from [address] called to get clarification on the new script for insulin. She stated that it went from humalog to a concentrated amount which is much higher than before.   She requested a call back at [phone] thanks

## 2022-08-03 NOTE — TELEPHONE ENCOUNTER
Called pharmacist, who was concerned pt did not fully understand our plan with the insulin changes. I called pt and again instructed him to stop the Lantus and he will start the Humalg U-500 100 units TID/AC as his insulin to control his BGs. Pt to continue to use the Humulin R U-100 5:50 correction for BG >150 only. Pt voices understanding and agreement with the plan.

## 2022-08-08 ENCOUNTER — APPOINTMENT (OUTPATIENT)
Dept: CARDIAC REHAB | Age: 42
End: 2022-08-08
Payer: MEDICAID

## 2022-08-10 ENCOUNTER — HOSPITAL ENCOUNTER (OUTPATIENT)
Dept: CARDIAC REHAB | Age: 42
Discharge: HOME | End: 2022-08-10
Payer: MEDICAID

## 2022-08-10 VITALS — BODY MASS INDEX: 38.66 KG/M2 | WEIGHT: 261.8 LBS

## 2022-08-10 LAB
GLUCOSE BLD STRIP.AUTO-MCNC: 275 MG/DL (ref 65–117)
SERVICE CMNT-IMP: ABNORMAL

## 2022-08-10 PROCEDURE — 93798 PHYS/QHP OP CAR RHAB W/ECG: CPT

## 2022-08-10 PROCEDURE — 93797 PHYS/QHP OP CAR RHAB WO ECG: CPT

## 2022-08-12 ENCOUNTER — TELEPHONE (OUTPATIENT)
Dept: ENDOCRINOLOGY | Age: 42
End: 2022-08-12

## 2022-08-12 DIAGNOSIS — F41.1 GAD (GENERALIZED ANXIETY DISORDER): ICD-10-CM

## 2022-08-12 RX ORDER — BUSPIRONE HYDROCHLORIDE 15 MG/1
15 TABLET ORAL 3 TIMES DAILY
Qty: 90 TABLET | Refills: 2 | Status: SHIPPED | OUTPATIENT
Start: 2022-08-12

## 2022-08-12 NOTE — TELEPHONE ENCOUNTER
----- Message from Domi Browning LPN sent at 4/61/2894  1:26 PM EDT -----  Regarding: FW: U-500    ----- Message -----  From: Mariana Britt  Sent: 8/12/2022  10:24 AM EDT  To: [facility] Nurse Pool  Subject: U-500                                            Kirti Escobar can you give me a call? I have some questions about this new insulin. My blood sugar has been all over the place. I did start myself off on 40 units and now I'm up to 90 units and I'm staying in the high 400 in the mornings. If you can please call me that will be great. My number is [phone].  Thank you sir

## 2022-08-12 NOTE — TELEPHONE ENCOUNTER
Pt notes he started 40 units AM and with dinner and he notes his BGs have been in the 300s during the day and 300-400s in the yesterday he increased to 90 units in the AM and his BGs during the day were in the 200s. I instructed him to take 100 units of U-500 in the AM and 100 units with dinner. If he eats lunch in the afternoon, pt is to take 40 units. Pt to call next week with BG readings. Pt voices understanding and agreement with the plan.

## 2022-08-15 ENCOUNTER — APPOINTMENT (OUTPATIENT)
Dept: CARDIAC REHAB | Age: 42
End: 2022-08-15
Payer: MEDICAID

## 2022-08-18 RX ORDER — INSULIN GLARGINE 300 U/ML
INJECTION, SOLUTION SUBCUTANEOUS
Qty: 15 ML | Refills: 5 | Status: SHIPPED | OUTPATIENT
Start: 2022-08-18 | End: 2022-09-30 | Stop reason: SDUPTHER

## 2022-08-18 NOTE — PROGRESS NOTES
Spoke with pt he notes the U-500 is not working well for him and he is not feeling well with this new insulin. We agreed to try Toujeo Max U-300, 100 units day and Humalin R U-100, 40 units with each meal.    Pt voices understanding and agreement with the plan.

## 2022-08-19 ENCOUNTER — DOCUMENTATION ONLY (OUTPATIENT)
Dept: ENDOCRINOLOGY | Age: 42
End: 2022-08-19

## 2022-08-29 ENCOUNTER — APPOINTMENT (OUTPATIENT)
Dept: CARDIAC REHAB | Age: 42
End: 2022-08-29
Payer: MEDICAID

## 2022-08-31 ENCOUNTER — APPOINTMENT (OUTPATIENT)
Dept: CARDIAC REHAB | Age: 42
End: 2022-08-31
Payer: MEDICAID

## 2022-09-01 ENCOUNTER — TELEPHONE (OUTPATIENT)
Dept: ENDOCRINOLOGY | Age: 42
End: 2022-09-01

## 2022-09-01 DIAGNOSIS — E10.42 TYPE 1 DIABETES MELLITUS WITH DIABETIC POLYNEUROPATHY (HCC): ICD-10-CM

## 2022-09-01 DIAGNOSIS — E78.2 MIXED HYPERLIPIDEMIA: ICD-10-CM

## 2022-09-01 DIAGNOSIS — E29.1 HYPOGONADISM IN MALE: Primary | ICD-10-CM

## 2022-09-01 DIAGNOSIS — E03.9 ACQUIRED HYPOTHYROIDISM: ICD-10-CM

## 2022-09-01 NOTE — TELEPHONE ENCOUNTER
Toujeo Max 100 units every day    Novolin R If you eat a meal, take 20 units plus correction.   If you have a snack (two pieces of bread, or crackers) Take 10 units of the Novolin R.    150-199 5 additional units  200-249 10 additional units  250-299 15 additional units  300-349 20 additional units  350-399 25 additional units  400-449 30 additional units  594-046 35 additional units  500-549 40 additional units  550+ 45 additional units

## 2022-09-02 ENCOUNTER — OFFICE VISIT (OUTPATIENT)
Dept: SLEEP MEDICINE | Age: 42
End: 2022-09-02
Payer: COMMERCIAL

## 2022-09-02 VITALS
HEART RATE: 82 BPM | OXYGEN SATURATION: 96 % | BODY MASS INDEX: 38.08 KG/M2 | WEIGHT: 257.1 LBS | DIASTOLIC BLOOD PRESSURE: 70 MMHG | SYSTOLIC BLOOD PRESSURE: 113 MMHG | TEMPERATURE: 97.5 F | RESPIRATION RATE: 20 BRPM | HEIGHT: 69 IN

## 2022-09-02 DIAGNOSIS — I25.110 CORONARY ARTERY DISEASE INVOLVING NATIVE CORONARY ARTERY OF NATIVE HEART WITH UNSTABLE ANGINA PECTORIS (HCC): ICD-10-CM

## 2022-09-02 DIAGNOSIS — I10 ESSENTIAL HYPERTENSION: ICD-10-CM

## 2022-09-02 DIAGNOSIS — G47.33 OSA (OBSTRUCTIVE SLEEP APNEA): Primary | ICD-10-CM

## 2022-09-02 DIAGNOSIS — E11.21 TYPE 2 DIABETES WITH NEPHROPATHY (HCC): ICD-10-CM

## 2022-09-02 PROCEDURE — 3046F HEMOGLOBIN A1C LEVEL >9.0%: CPT | Performed by: NURSE PRACTITIONER

## 2022-09-02 PROCEDURE — 99214 OFFICE O/P EST MOD 30 MIN: CPT | Performed by: NURSE PRACTITIONER

## 2022-09-02 NOTE — PROGRESS NOTES
Patient was fit with a F&P Flakito FFM and will use for trial.   Patient will follow up if working well and would like stay with use.

## 2022-09-02 NOTE — PROGRESS NOTES
[address] [facility] [place] [place], [address]   Tel.  [phone]   Fax. [address] [facility] [address]   [place], [address] [facility]   Tel.  [phone]   Fax. [phone] [address]   Tel.  [phone]   Fax. [phone] (: 1980) is a 36 y.o. male, established patient, seen for positive airway pressure follow-up and evaluation. He was last seen by Dr. Ivy Ignacio on 2022, prior notes and sleep tests reviewed in detail. Home sleep test 2021 showed AHI of 76.3/hr with a lowest SaO2 of 71%, duration of SaO2 < 88% 70 min. Subsequent titration 2021 showed CPAP failure with adequate treatment on BiPAP. ASSESSMENT/PLAN:    ICD-10-CM ICD-9-CM    1. WALLY (obstructive sleep apnea)  G47.33 327.23       2. Essential hypertension  I10 401.9       3. Coronary artery disease involving native coronary artery of native heart with unstable angina pectoris ([facility])  I25.110 414.01      411.1       4. Type 2 diabetes with nephropathy ([facility])  E11.21 250.40      583.81       5. Adult BMI 37.0-37.9 kg/sq m  Z68.37 V85.37           AHI = 76.3(2021). On Bi - Level :  Max IPAP 25 cmH2O; Min EPAP 11 cmH2O; PS 4 cmH2O. Set up 2021.    he is not compliant with PAP therapy although when used PAP shows benefit to the patient and remains necessary for control of his sleep apnea. There is continued clinical benefit from the hours of use demonstrated by AHI reduced from 76.3/hr to 5.8/hr. Follow-up and Dispositions    Return in about 4 weeks (around 2022) for compliance follow up - can be virtual .         Sleep Apnea -    Sleep apnea condition has been exacerbated and treatment is causing negative side effects. Change in treatment plan is needed. Change full face mask style to below the nose and start desensitization process for mask tolerance.     * Tech to assess mask fit for tolerance, history of anxiety and panic attack with F20 style mask    * Counseling was provided regarding the importance of regular PAP use with emphasis on ensuring sufficient total sleep time, proper sleep hygiene, and safe driving. * Re-enforced proper and regular cleaning for the device. * He was asked to contact our office for any problems regarding PAP therapy. 2. Hypertension -  continue on his current regimen, he will continue to monitor his BP and follow up with his PMD for reevaluation/adjustment of medications if warranted. I have reviewed the relationship between hypertension as it relates to sleep-disordered breathing. 3. Coronary Artery Disease - continue on his current regimen. I have reviewed the relationship between heart disease and sleep disordered breathing. 4. Type II diabetes -  he continues on his current regimen. I have reviewed the relationship between sleep disordered breathing as it relates to diabetes. 5.  Encouraged continued weight management program through appropriate diet and exercise regimen as significant weight reduction has been shown to reduce severity of obstructive sleep apnea. SUBJECTIVE/OBJECTIVE:    He  is seen today for follow up on PAP device and reports problems using the device. The following concerns identified:    Drowsiness no Problems exhaling no   Snoring no Forget to put on no   Mask Comfortable No, he feels it is suffocating Can't fall asleep yes   Dry Mouth no Mask falls off no   Air Leaking no Frequent awakenings no       He admits that his sleep had slightly improved on PAP therapy using full face mask and heated tubing but he had a panic attack while wearing the mask earlier this year and has not been able to resume using the mask. He notes that he is waking up more and more tired during the day. He reports working on his overall health and wanting to treat the sleep apnea for cardiac benefits.   We discussed alternatives to PAP therapy which are limited due to the severity of his apnea and his current weight. He may be interested in an oral appliance or inspire if we are unable to overcome the CPAP intolerance issues. Review of device download indicates no use in the prior 5 months, previous data shows: Auto Bilevel pressure: Max UPAP 25, Min EPAP 11, PS 4 cmH2O; Max Pressure: IPAP 20.1, EPAP 16.1 cmH2O;  95th percentile Pressure: IPAP 18.9, EPAP 14.9 cmH2O   95th Percentile Leak: 34.0 L/Min       Therapy Apnea Index averaged over PAP use: 5.8 /hr which reflects improved sleep breathing condition. Fitzgerald Sleepiness Score: 14 and Modified F.O.S.Q. Score Total / 2: 13.5 which reflects significant daytime sleepiness. Sleep Review of Systems: notable for Negative difficulty falling asleep; Positive awakenings at night; Negative early morning headaches; Negative memory problems; Negative concentration issues; Negative chest pain; Negative shortness of breath; Negative significant joint pain at night; Negative significant muscle pain at night; Negative rashes or itching; Negative heartburn; Negative significant mood issues; daily afternoon naps per week      Visit Vitals  /70 (BP 1 Location: Right arm, BP Patient Position: Sitting, BP Cuff Size: Large adult)   Pulse 82   Temp 97.5 °F (36.4 °C) (Temporal)   Resp 20   Ht 5' 9\" (1.753 m)   Wt 257 lb 1.6 oz (116.6 kg)   SpO2 96%   BMI 37.97 kg/m²          General:   Alert, oriented, not in acute distress   Eyes:  Anicteric Sclerae; no obvious strabismus   Nose:  No obvious nasal septum deviation    Neck:   Midline trachea   Chest/Lungs:  Symmetrical lung expansion, clear lung fields on auscultation    CVS:  Normal rate, regular rhythm,  no JVD   Extremities:  No obvious rashes, no edema    Neuro:  No focal deficits; No obvious tremor    Psych:  Normal affect,  normal countenance       An electronic signature was used to authenticate this note.     -- Tj Lechuga NP, [facility]  09/02/22

## 2022-09-02 NOTE — PATIENT INSTRUCTIONS
[address] [facility] [place]. [place], [address]  Tel.  [phone]  Fax. 100 [facility] 60  [place], [address] [facility]  Tel.  [phone]  Fax. [phone] [address]  Tel.  [phone]  Fax. [phone]     Learning About CPAP for Sleep Apnea  What is CPAP? CPAP is a small machine that you use at home every night while you sleep. It increases air pressure in your throat to keep your airway open. When you have sleep apnea, this can help you sleep better so you feel much better. CPAP stands for \"continuous positive airway pressure. \"  The CPAP machine will have one of the following:  A mask that covers your nose and mouth  Prongs that fit into your nose  A mask that covers your nose only, the most common type. This type is called NCPAP. The N stands for \"nasal.\"  Why is it done? CPAP is usually the best treatment for obstructive sleep apnea. It is the first treatment choice and the most widely used. Your doctor may suggest CPAP if you have: Moderate to severe sleep apnea. Sleep apnea and coronary artery disease (CAD) or heart failure. How does it help? CPAP can help you have more normal sleep, so you feel less sleepy and more alert during the daytime. CPAP may help keep heart failure or other heart problems from getting worse. NCPAP may help lower your blood pressure. If you use CPAP, your bed partner may also sleep better because you are not snoring or restless. What are the side effects? Some people who use CPAP have:  A dry or stuffy nose and a sore throat. Irritated skin on the face. Sore eyes. Bloating. If you have any of these problems, work with your doctor to fix them. Here are some things you can try:  Be sure the mask or nasal prongs fit well. See if your doctor can adjust the pressure of your CPAP. If your nose is dry, try a humidifier.   If your nose is runny or stuffy, try decongestant medicine or a steroid nasal spray. If these things do not help, you might try a different type of machine. Some machines have air pressure that adjusts on its own. Others have air pressures that are different when you breathe in than when you breathe out. This may reduce discomfort caused by too much pressure in your nose. Where can you learn more? Go to [employer]  Enter Carissa Abraham in the search box to learn more about \"Learning About CPAP for Sleep Apnea. \"   © 8524-0052 Healthwise, Incorporated. Care instructions adapted under license by [employer] (which disclaims liability or warranty for this information). This care instruction is for use with your licensed healthcare professional. If you have questions about a medical condition or this instruction, always ask your healthcare professional. [address] any warranty or liability for your use of this information. Content Version: 3.4.95862; Last Revised: January 11, 2010  PROPER SLEEP HYGIENE    What to avoid  Do not have drinks with caffeine, such as coffee or black tea, for 8 hours before bed. Do not smoke or use other types of tobacco near bedtime. Nicotine is a stimulant and can keep you awake. Avoid drinking alcohol late in the evening, because it can cause you to wake in the middle of the night. Do not eat a big meal close to bedtime. If you are hungry, eat a light snack. Do not drink a lot of water close to bedtime, because the need to urinate may wake you up during the night. Do not read or watch TV in bed. Use the bed only for sleeping and sexual activity. What to try  Go to bed at the same time every night, and wake up at the same time every morning. Do not take naps during the day. Keep your bedroom quiet, dark, and cool. Get regular exercise, but not within 3 to 4 hours of your bedtime. .  Sleep on a comfortable pillow and mattress.   If watching the clock makes you anxious, turn it facing away from you so you cannot see the time. If you worry when you lie down, start a worry book. Well before bedtime, write down your worries, and then set the book and your concerns aside. Try meditation or other relaxation techniques before you go to bed. If you cannot fall asleep, get up and go to another room until you feel sleepy. Do something relaxing. Repeat your bedtime routine before you go to bed again. Make your house quiet and calm about an hour before bedtime. Turn down the lights, turn off the TV, log off the computer, and turn down the volume on music. This can help you relax after a busy day. Drowsy Driving: The [employer] cites drowsiness as a causing factor in more than 419,676 police reported crashes annually, resulting in 76,000 injuries and 1,500 deaths. Other surveys suggest 55% of people polled have driven while drowsy in the past year, 23% had fallen asleep but not crashed, 3% crashed, and 2% had and accident due to drowsy driving. Who is at risk? Young Drivers: One study of drowsy driving accidents states that 55% of the drivers were under 25 years. Of those, 75% were male. Shift Workers and Travelers: People who work overnight or travel across time zones frequently are at higher risk of experiencing Circadian Rhythm Disorders. They are trying to work and function when their body is programed to sleep. Sleep Deprived: Lack of sleep has a serious impact on your ability to pay attention or focus on a task. Consistently getting less than the average of 8 hours your body needs creates partial or cumulative sleep deprivation. Untreated Sleep Disorders: Sleep Apnea, Narcolepsy, R.L.S., and other sleep disorders (untreated) prevent a person from getting enough restful sleep. This leads to excessive daytime sleepiness and increases the risk for drowsy driving accidents by up to 7 times.   Medications / Alcohol: Even over the counter medications can cause drowsiness. Medications that impair a drivers attention should have a warning label. Alcohol naturally makes you sleepy and on its own can cause accidents. Combined with excessive drowsiness its effects are amplified. Signs of Drowsy Driving:   * You don't remember driving the last few miles   * You may drift out of your sheyla   * You are unable to focus and your thoughts wander   * You may yawn more often than normal   * You have difficulty keeping your eyes open / nodding off   * Missing traffic signs, speeding, or tailgating  Prevention-   Good sleep hygiene, lifestyle and behavioral choices have the most impact on drowsy driving. There is no substitute for sleep and the average person requires 8 hours nightly. If you find yourself driving drowsy, stop and sleep. Consider the sleep hygiene tips provided during your visit as well. Medication Refill Policy: Refills for all medications require 1 week advance notice. Please have your pharmacy fax a refill request. We are unable to fax, or call in \"controled substance\" medications and you will need to pick these prescriptions up from our office. Divvyshot Activation    Thank you for requesting access to Divvyshot. Please follow the instructions below to securely access and download your online medical record. Divvyshot allows you to send messages to your doctor, view your test results, renew your prescriptions, schedule appointments, and more. How Do I Sign Up? In your internet browser, go to [email or web address]. Click on the First Time User? Click Here link in the Sign In box. You will see the New Member Sign Up page. Enter your Divvyshot Access Code exactly as it appears below. You will not need to use this code after youve completed the sign-up process. If you do not sign up before the expiration date, you must request a new code. Divvyshot Access Code:  Activation code not generated  Current Divvyshot Status: Active (This is the date your Pop[employer].org access code will )    Enter the last four digits of your Social Security Number (xxxx) and Date of Birth (mm/dd/yyyy) as indicated and click Submit. You will be taken to the next sign-up page. Create a Populus.org ID. This will be your Populus.org login ID and cannot be changed, so think of one that is secure and easy to remember. Create a Populus.org password. You can change your password at any time. Enter your Password Reset Question and Answer. This can be used at a later time if you forget your password. Enter your e-mail address. You will receive e-mail notification when new information is available in [address] Click Sign Up. You can now view and download portions of your medical record. Click the Workpop link to download a portable copy of your medical information. Additional Information    If you have questions, please call [phone]. Remember, [email or web address] is NOT to be used for urgent needs. For medical emergencies, dial 911.

## 2022-09-07 ENCOUNTER — APPOINTMENT (OUTPATIENT)
Dept: CARDIAC REHAB | Age: 42
End: 2022-09-07

## 2022-09-12 ENCOUNTER — HOSPITAL ENCOUNTER (OUTPATIENT)
Dept: CARDIAC REHAB | Age: 42
End: 2022-09-12

## 2022-09-13 LAB
ALBUMIN SERPL-MCNC: 4.4 G/DL (ref 4–5)
ALBUMIN/GLOB SERPL: 1.9 {RATIO} (ref 1.2–2.2)
ALP SERPL-CCNC: 133 IU/L (ref 44–121)
ALT SERPL-CCNC: 47 IU/L (ref 0–44)
AST SERPL-CCNC: 23 IU/L (ref 0–40)
BILIRUB SERPL-MCNC: 0.3 MG/DL (ref 0–1.2)
BUN SERPL-MCNC: 18 MG/DL (ref 6–24)
BUN/CREAT SERPL: 18 (ref 9–20)
CALCIUM SERPL-MCNC: 9.5 MG/DL (ref 8.7–10.2)
CHLORIDE SERPL-SCNC: 93 MMOL/L (ref 96–106)
CHOLEST SERPL-MCNC: 181 MG/DL (ref 100–199)
CO2 SERPL-SCNC: 21 MMOL/L (ref 20–29)
CREAT SERPL-MCNC: 1.01 MG/DL (ref 0.76–1.27)
EGFR: 95 ML/MIN/1.73
ERYTHROCYTE [DISTWIDTH] IN BLOOD BY AUTOMATED COUNT: 14.6 % (ref 11.6–15.4)
GLOBULIN SER CALC-MCNC: 2.3 G/DL (ref 1.5–4.5)
GLUCOSE SERPL-MCNC: 352 MG/DL (ref 65–99)
HCT VFR BLD AUTO: 41.9 % (ref 37.5–51)
HDLC SERPL-MCNC: 28 MG/DL
HGB BLD-MCNC: 13.8 G/DL (ref 13–17.7)
IMP & REVIEW OF LAB RESULTS: NORMAL
LDLC SERPL CALC-MCNC: 68 MG/DL (ref 0–99)
MCH RBC QN AUTO: 27.8 PG (ref 26.6–33)
MCHC RBC AUTO-ENTMCNC: 32.9 G/DL (ref 31.5–35.7)
MCV RBC AUTO: 85 FL (ref 79–97)
PLATELET # BLD AUTO: 171 X10E3/UL (ref 150–450)
POTASSIUM SERPL-SCNC: 5.2 MMOL/L (ref 3.5–5.2)
PROT SERPL-MCNC: 6.7 G/DL (ref 6–8.5)
RBC # BLD AUTO: 4.96 X10E6/UL (ref 4.14–5.8)
SODIUM SERPL-SCNC: 130 MMOL/L (ref 134–144)
T4 FREE SERPL-MCNC: 0.9 NG/DL (ref 0.82–1.77)
TESTOST FREE SERPL-MCNC: 6 PG/ML (ref 6.8–21.5)
TESTOST SERPL-MCNC: 207 NG/DL (ref 264–916)
TRIGL SERPL-MCNC: 552 MG/DL (ref 0–149)
TSH SERPL DL<=0.005 MIU/L-ACNC: 4.7 UIU/ML (ref 0.45–4.5)
VLDLC SERPL CALC-MCNC: 85 MG/DL (ref 5–40)
WBC # BLD AUTO: 6.7 X10E3/UL (ref 3.4–10.8)

## 2022-09-13 RX ORDER — LEVOTHYROXINE SODIUM 125 UG/1
125 TABLET ORAL
Qty: 90 TABLET | Refills: 3 | Status: SHIPPED | OUTPATIENT
Start: 2022-09-13

## 2022-09-14 ENCOUNTER — HOSPITAL ENCOUNTER (EMERGENCY)
Age: 42
Discharge: HOME | End: 2022-09-14
Attending: STUDENT IN AN ORGANIZED HEALTH CARE EDUCATION/TRAINING PROGRAM
Payer: COMMERCIAL

## 2022-09-14 ENCOUNTER — HOSPITAL ENCOUNTER (OUTPATIENT)
Dept: CARDIAC REHAB | Age: 42
Discharge: HOME | End: 2022-09-14

## 2022-09-14 ENCOUNTER — APPOINTMENT (OUTPATIENT)
Dept: GENERAL RADIOLOGY | Age: 42
End: 2022-09-14
Attending: STUDENT IN AN ORGANIZED HEALTH CARE EDUCATION/TRAINING PROGRAM
Payer: COMMERCIAL

## 2022-09-14 VITALS
DIASTOLIC BLOOD PRESSURE: 75 MMHG | RESPIRATION RATE: 11 BRPM | OXYGEN SATURATION: 98 % | BODY MASS INDEX: 37.77 KG/M2 | WEIGHT: 255 LBS | HEART RATE: 78 BPM | HEIGHT: 69 IN | SYSTOLIC BLOOD PRESSURE: 135 MMHG | TEMPERATURE: 98.8 F

## 2022-09-14 DIAGNOSIS — R73.9 HYPERGLYCEMIA: ICD-10-CM

## 2022-09-14 DIAGNOSIS — R07.9 CHEST PAIN, UNSPECIFIED TYPE: Primary | ICD-10-CM

## 2022-09-14 DIAGNOSIS — R10.13 DYSPEPSIA: ICD-10-CM

## 2022-09-14 LAB
ALBUMIN SERPL-MCNC: 3.4 G/DL (ref 3.5–5)
ALBUMIN/GLOB SERPL: 1 {RATIO} (ref 1.1–2.2)
ALP SERPL-CCNC: 105 U/L (ref 45–117)
ALT SERPL-CCNC: 58 U/L (ref 12–78)
ANION GAP SERPL CALC-SCNC: 6 MMOL/L (ref 5–15)
AST SERPL-CCNC: 26 U/L (ref 15–37)
ATRIAL RATE: 74 BPM
ATRIAL RATE: 77 BPM
BASOPHILS # BLD: 0.1 K/UL (ref 0–0.1)
BASOPHILS NFR BLD: 1 % (ref 0–1)
BILIRUB SERPL-MCNC: 0.5 MG/DL (ref 0.2–1)
BNP SERPL-MCNC: 176 PG/ML
BUN SERPL-MCNC: 20 MG/DL (ref 6–20)
BUN/CREAT SERPL: 17 (ref 12–20)
CALCIUM SERPL-MCNC: 9.1 MG/DL (ref 8.5–10.1)
CALCULATED P AXIS, ECG09: 47 DEGREES
CALCULATED P AXIS, ECG09: 60 DEGREES
CALCULATED R AXIS, ECG10: -3 DEGREES
CALCULATED R AXIS, ECG10: 36 DEGREES
CALCULATED T AXIS, ECG11: 100 DEGREES
CALCULATED T AXIS, ECG11: 82 DEGREES
CHLORIDE SERPL-SCNC: 100 MMOL/L (ref 97–108)
CO2 SERPL-SCNC: 28 MMOL/L (ref 21–32)
CREAT SERPL-MCNC: 1.15 MG/DL (ref 0.7–1.3)
DIAGNOSIS, 93000: NORMAL
DIAGNOSIS, 93000: NORMAL
DIFFERENTIAL METHOD BLD: ABNORMAL
EOSINOPHIL # BLD: 0.3 K/UL (ref 0–0.4)
EOSINOPHIL NFR BLD: 3 % (ref 0–7)
ERYTHROCYTE [DISTWIDTH] IN BLOOD BY AUTOMATED COUNT: 14.5 % (ref 11.5–14.5)
GLOBULIN SER CALC-MCNC: 3.5 G/DL (ref 2–4)
GLUCOSE BLD STRIP.AUTO-MCNC: 390 MG/DL (ref 65–117)
GLUCOSE SERPL-MCNC: 383 MG/DL (ref 65–100)
HCT VFR BLD AUTO: 38.5 % (ref 36.6–50.3)
HGB BLD-MCNC: 12.6 G/DL (ref 12.1–17)
IMM GRANULOCYTES # BLD AUTO: 0.2 K/UL (ref 0–0.04)
IMM GRANULOCYTES NFR BLD AUTO: 2 % (ref 0–0.5)
LYMPHOCYTES # BLD: 2.1 K/UL (ref 0.8–3.5)
LYMPHOCYTES NFR BLD: 24 % (ref 12–49)
MCH RBC QN AUTO: 27 PG (ref 26–34)
MCHC RBC AUTO-ENTMCNC: 32.7 G/DL (ref 30–36.5)
MCV RBC AUTO: 82.4 FL (ref 80–99)
MONOCYTES # BLD: 0.7 K/UL (ref 0–1)
MONOCYTES NFR BLD: 8 % (ref 5–13)
NEUTS SEG # BLD: 5.4 K/UL (ref 1.8–8)
NEUTS SEG NFR BLD: 62 % (ref 32–75)
NRBC # BLD: 0 K/UL (ref 0–0.01)
NRBC BLD-RTO: 0 PER 100 WBC
P-R INTERVAL, ECG05: 158 MS
P-R INTERVAL, ECG05: 160 MS
PLATELET # BLD AUTO: 173 K/UL (ref 150–400)
PMV BLD AUTO: 10 FL (ref 8.9–12.9)
POTASSIUM SERPL-SCNC: 5.1 MMOL/L (ref 3.5–5.1)
PROT SERPL-MCNC: 6.9 G/DL (ref 6.4–8.2)
Q-T INTERVAL, ECG07: 380 MS
Q-T INTERVAL, ECG07: 382 MS
QRS DURATION, ECG06: 86 MS
QRS DURATION, ECG06: 88 MS
QTC CALCULATION (BEZET), ECG08: 424 MS
QTC CALCULATION (BEZET), ECG08: 430 MS
RBC # BLD AUTO: 4.67 M/UL (ref 4.1–5.7)
SERVICE CMNT-IMP: ABNORMAL
SODIUM SERPL-SCNC: 134 MMOL/L (ref 136–145)
TROPONIN-HIGH SENSITIVITY: 22 NG/L (ref 0–76)
TROPONIN-HIGH SENSITIVITY: 24 NG/L (ref 0–76)
VENTRICULAR RATE, ECG03: 74 BPM
VENTRICULAR RATE, ECG03: 77 BPM
WBC # BLD AUTO: 8.7 K/UL (ref 4.1–11.1)

## 2022-09-14 PROCEDURE — 83880 ASSAY OF NATRIURETIC PEPTIDE: CPT

## 2022-09-14 PROCEDURE — 74011000250 HC RX REV CODE- 250: Performed by: STUDENT IN AN ORGANIZED HEALTH CARE EDUCATION/TRAINING PROGRAM

## 2022-09-14 PROCEDURE — 85025 COMPLETE CBC W/AUTO DIFF WBC: CPT

## 2022-09-14 PROCEDURE — 93005 ELECTROCARDIOGRAM TRACING: CPT

## 2022-09-14 PROCEDURE — 80053 COMPREHEN METABOLIC PANEL: CPT

## 2022-09-14 PROCEDURE — 84484 ASSAY OF TROPONIN QUANT: CPT

## 2022-09-14 PROCEDURE — 99285 EMERGENCY DEPT VISIT HI MDM: CPT

## 2022-09-14 PROCEDURE — 96375 TX/PRO/DX INJ NEW DRUG ADDON: CPT

## 2022-09-14 PROCEDURE — 71045 X-RAY EXAM CHEST 1 VIEW: CPT

## 2022-09-14 PROCEDURE — 74011250636 HC RX REV CODE- 250/636: Performed by: STUDENT IN AN ORGANIZED HEALTH CARE EDUCATION/TRAINING PROGRAM

## 2022-09-14 PROCEDURE — 96361 HYDRATE IV INFUSION ADD-ON: CPT

## 2022-09-14 PROCEDURE — 96374 THER/PROPH/DIAG INJ IV PUSH: CPT

## 2022-09-14 PROCEDURE — 82962 GLUCOSE BLOOD TEST: CPT

## 2022-09-14 PROCEDURE — 36415 COLL VENOUS BLD VENIPUNCTURE: CPT

## 2022-09-14 PROCEDURE — 74011636637 HC RX REV CODE- 636/637: Performed by: STUDENT IN AN ORGANIZED HEALTH CARE EDUCATION/TRAINING PROGRAM

## 2022-09-14 PROCEDURE — 74011250637 HC RX REV CODE- 250/637: Performed by: STUDENT IN AN ORGANIZED HEALTH CARE EDUCATION/TRAINING PROGRAM

## 2022-09-14 RX ORDER — ACETAMINOPHEN 500 MG
1000 TABLET ORAL ONCE
Status: COMPLETED | OUTPATIENT
Start: 2022-09-14 | End: 2022-09-14

## 2022-09-14 RX ORDER — MAG HYDROX/ALUMINUM HYD/SIMETH 200-200-20
30 SUSPENSION, ORAL (FINAL DOSE FORM) ORAL
Qty: 150 ML | Refills: 0 | Status: SHIPPED | OUTPATIENT
Start: 2022-09-14

## 2022-09-14 RX ORDER — NITROGLYCERIN 0.4 MG/1
0.4 TABLET SUBLINGUAL ONCE
Status: COMPLETED | OUTPATIENT
Start: 2022-09-14 | End: 2022-09-14

## 2022-09-14 RX ADMIN — NITROGLYCERIN 0.4 MG: 0.4 TABLET, ORALLY DISINTEGRATING SUBLINGUAL at 10:01

## 2022-09-14 RX ADMIN — Medication 8 UNITS: at 12:26

## 2022-09-14 RX ADMIN — ACETAMINOPHEN 1000 MG: 500 TABLET ORAL at 10:01

## 2022-09-14 RX ADMIN — FAMOTIDINE 20 MG: 10 INJECTION INTRAVENOUS at 10:00

## 2022-09-14 RX ADMIN — SODIUM CHLORIDE 1000 ML: 9 INJECTION, SOLUTION INTRAVENOUS at 12:26

## 2022-09-14 RX ADMIN — ALUMINUM HYDROXIDE AND MAGNESIUM HYDROXIDE: 200; 200 SUSPENSION ORAL at 10:00

## 2022-09-14 NOTE — ED TRIAGE NOTES
Pt arrives by Ems complaining of chest pain started at 0700 in the middle radiating to the left. Pt has a hx of 9 stents. Pt states he felt that his arms were heavy. EMS did give 324mg of aspirin. Pt states he felt like he was going to pass out.

## 2022-09-14 NOTE — ED NOTES
I reviewed discharge instructions with the patient. The patient verbalized understanding. All questions and concerns were addressed. The patient is discharged home with instructions and prescriptions in hand. Pt is alert and oriented x 4. Respirations are clear and unlabored.

## 2022-09-14 NOTE — ED PROVIDER NOTES
EMERGENCY DEPARTMENT HISTORY AND PHYSICAL EXAM      Date: 9/14/2022  Patient Name: Sri Pereira    History of Presenting Illness     Chief Complaint   Patient presents with    Chest Pain     Pt arrives by EMS where he reports chest pain in the middle of his chest. Pt reports his arms felt heavy. Hx of 9 stents. HPI: Sri Pereira, 36 y.o. male presents to the ED with cc of chest pain. This started around 7 AM after he lay flat after eating several pieces of bologna. He states that he has had stomach issues for the past year, and has similar symptoms especially after he eats spicy food. He describes this as a substernal tightness. He reports some associated shortness of breath, no associated nausea or diaphoresis. Since it started, the discomfort comes and goes randomly, nonexertional, it feels improved now. Got 325 aspirin by EMS prior to arrival.  He otherwise denies any fevers or cough, no leg pain or leg swelling, he has been compliant with all of his medications including his aspirin and Plavix. There are no other complaints, changes, or physical findings at this time. PCP: Brittany Munson MD    No current facility-administered medications on file prior to encounter. Current Outpatient Medications on File Prior to Encounter   Medication Sig Dispense Refill    levothyroxine (SYNTHROID) 125 mcg tablet Take 1 Tablet by mouth Daily (before breakfast). 90 Tablet 3    insulin glargine U-300 conc (Toujeo Max U-300 SoloStar) 300 unit/mL (3 mL) inpn Take 100 units every day 15 mL 5    insulin regular (NOVOLIN R, HUMULIN R) 100 unit/mL injection 40 units with each meal, plus correction. Max daily dose of 150 units. 40 mL 5    busPIRone (BUSPAR) 15 mg tablet Take 1 Tablet by mouth three (3) times daily.  90 Tablet 2    Insulin Needles, Disposable, 32 gauge x 5/32\" ndle 5 shots per day (dispense whatever brand is covered by his insurance) 500 Pen Needle 3    sertraline (ZOLOFT) 100 mg tablet Take 1 Tablet by mouth daily. 30 Tablet 2    sertraline (ZOLOFT) 25 mg tablet Take 1 Tablet by mouth daily. 30 Tablet 2    lisinopriL (PRINIVIL, ZESTRIL) 10 mg tablet Take 10 mg by mouth daily. testosterone (ANDROGEL) 20.25 mg/1.25 gram (1.62 %) gel Apply 4 pump presses daily. Ok to dispense generic testosterone. 2 Each 5    insulin U-500 syringe-needle (BD Insulin Syringe U-500) 1/2 mL 31 gauge x 15/64\" syrg Three shots per day. 300 Syringe 3    ubrogepant (UBRELVY) 100 mg tablet Take 1 Tablet by mouth daily as needed for Migraine. Indications: a migraine headache (Patient not taking: No sig reported) 16 Tablet 12    butalbital-acetaminophen-caffeine (FIORICET, ESGIC) -40 mg per tablet Take 1 Tablet by mouth every six (6) hours as needed for Headache. Indications: a migraine headache (Patient not taking: No sig reported) 10 Tablet 0    esomeprazole (NEXIUM) 40 mg capsule TAKE 1 CAPSULE BY MOUTH ONCE DAILY BEFORE BREAKFAST DO NOT OPEN CAPSULE      LORazepam (ATIVAN) 1 mg tablet Take 1 Tablet by mouth every eight (8) hours as needed for Anxiety. Max Daily Amount: 3 mg. 90 Tablet 0    metoprolol succinate (TOPROL-XL) 25 mg XL tablet Take 25 mg by mouth two (2) times a day. rosuvastatin (CRESTOR) 40 mg tablet Take 40 mg by mouth daily. lidocaine (XYLOCAINE) 2 % solution Take 15 mL by mouth as needed for Pain. Gargle and spit out to help with pain (Patient not taking: No sig reported) 1 Each 0    famotidine (PEPCID) 40 mg tablet Take 1 Tablet by mouth daily. 30 Tablet 3    clopidogreL (PLAVIX) 75 mg tab Take 1 Tablet by mouth daily. 30 Tablet 12    metFORMIN (GLUCOPHAGE) 500 mg tablet Take 1 Tablet by mouth two (2) times daily (with meals). 180 Tablet 3    cholecalciferol (Vitamin D3) (5000 Units/125 mcg) tab tablet Take 1 Tab by mouth daily. 90 Tab 1    Insulin Syringe-Needle U-100 (BD Insulin Syringe) 1 mL 25 x 1\" syrg Use for drawing up/injecting testosterone once weekly.  100 Each 3 aluminum & magnesium hydroxide-simethicone (MYLANTA II) 400-400-40 mg/5 mL suspension Take 10 mL by mouth every six (6) hours as needed for Indigestion. nitroglycerin (NITROSTAT) 0.4 mg SL tablet Take 1 Tab by mouth every five (5) minutes as needed for Chest Pain. Sit down then put one tab under the tongue every 5 minutes as needed 1 Bottle 0    aspirin delayed-release 81 mg tablet Take 1 Tab by mouth daily.  27 Tab 0       Past History     Past Medical History:  Past Medical History:   Diagnosis Date    Anxiety and depression     anxiety, depression    Bleeding of eye, left     8/17/21 pt reports receiving injections in right eye for beeding    Chronic headaches 2007    COVID-19 12/20/2020    Diabetes type 1, uncontrolled     since 8years old    GERD (gastroesophageal reflux disease)     Hypercholesterolemia     Hypertension     Hypothyroidism     MI (myocardial infarction) ([facility] [address]     as of 8/17/21 pt reports 8 stents total    WALLY on CPAP        Past Surgical History:  Past Surgical History:   Procedure Laterality Date    HX CARPAL TUNNEL RELEASE Left 2012    HX CARPAL TUNNEL RELEASE Right 2018    CTE trigger thumb and index finger    HX HEART CATHETERIZATION      HX HEENT      HX LAP CHOLECYSTECTOMY  8/26/14    Dr Fay Dys    HX WISDOM TEETH EXTRACTION         Family History:  Family History   Problem Relation Age of Onset    Diabetes Mother     Hypertension Mother     Heart Disease Father     Cancer Father     Diabetes Father     Other Father         MAC    Diabetes Paternal Aunt     Diabetes Maternal Grandmother     Thyroid Cancer Maternal Grandmother     Kidney Disease Maternal Grandmother     Arthritis Maternal Grandmother     Heart Disease Maternal Grandfather     High Cholesterol Maternal Grandfather     Hypertension Maternal Grandfather     Diabetes Paternal Grandmother     Hemophilia Paternal Grandfather        Social History:  Social History     Tobacco Use    Smoking status: Former     Packs/day: 0.00     Years: 14.00     Pack years: 0.00     Types: Cigarettes     Quit date: 2014     Years since quittin.7    Smokeless tobacco: Never   Vaping Use    Vaping Use: Never used   Substance Use Topics    Alcohol use: No    Drug use: No       Allergies: Allergies   Allergen Reactions    Morphine Nausea and Vomiting    Penicillin G Swelling    Percocet [Oxycodone-Acetaminophen] Hives and Nausea and Vomiting     Pt is allergic to Oxycodone, has previously tolerated Tylenol and Hydrocodone. Sulfa (Sulfonamide Antibiotics) Swelling    Tramadol Nausea Only     Nausea and headache           Review of Systems   no fever  No ear pain  No eye pain  Reports shortness of breath  Reports chest pain  no abdominal pain  no dysuria  no leg pain  No rash  No lymphadenopathy  No weight loss    Physical Exam   Physical Exam  Constitutional:       General: He is not in acute distress. Appearance: He is not toxic-appearing. HENT:      Head: Normocephalic and atraumatic. Eyes:      Extraocular Movements: Extraocular movements intact. Cardiovascular:      Rate and Rhythm: Normal rate and regular rhythm. Pulmonary:      Effort: Pulmonary effort is normal.      Breath sounds: Normal breath sounds. Abdominal:      Palpations: Abdomen is soft. Tenderness: There is no abdominal tenderness. Musculoskeletal:         General: No deformity. Cervical back: Neck supple. Skin:     General: Skin is warm and dry. Neurological:      General: No focal deficit present. Mental Status: He is alert and oriented to person, place, and time.    Psychiatric:         Mood and Affect: Mood normal.       Diagnostic Study Results     Labs -     Recent Results (from the past 24 hour(s))   EKG, 12 LEAD, INITIAL    Collection Time: 22  9:53 AM   Result Value Ref Range    Ventricular Rate 77 BPM    Atrial Rate 77 BPM    P-R Interval 158 ms    QRS Duration 86 ms    Q-T Interval 380 ms    QTC Calculation (Bezet) 430 ms Calculated P Axis 60 degrees    Calculated R Axis 36 degrees    Calculated T Axis 82 degrees    Diagnosis       Normal sinus rhythm  T wave abnormality, consider lateral ischemia  When compared with ECG of 15-DEC-2021 03:38,  No significant change was found     CBC WITH AUTOMATED DIFF    Collection Time: 09/14/22  9:54 AM   Result Value Ref Range    WBC 8.7 4.1 - 11.1 K/uL    RBC 4.67 4.10 - 5.70 M/uL    HGB 12.6 12.1 - 17.0 g/dL    HCT 38.5 36.6 - 50.3 %    MCV 82.4 80.0 - 99.0 FL    MCH 27.0 26.0 - 34.0 PG    MCHC 32.7 30.0 - 36.5 g/dL    RDW 14.5 11.5 - 14.5 %    PLATELET 359 659 - 339 K/uL    MPV 10.0 8.9 - 12.9 FL    NRBC 0.0 0  WBC    ABSOLUTE NRBC 0.00 0.00 - 0.01 K/uL    NEUTROPHILS 62 32 - 75 %    LYMPHOCYTES 24 12 - 49 %    MONOCYTES 8 5 - 13 %    EOSINOPHILS 3 0 - 7 %    BASOPHILS 1 0 - 1 %    IMMATURE GRANULOCYTES 2 (H) 0.0 - 0.5 %    ABS. NEUTROPHILS 5.4 1.8 - 8.0 K/UL    ABS. LYMPHOCYTES 2.1 0.8 - 3.5 K/UL    ABS. MONOCYTES 0.7 0.0 - 1.0 K/UL    ABS. EOSINOPHILS 0.3 0.0 - 0.4 K/UL    ABS. BASOPHILS 0.1 0.0 - 0.1 K/UL    ABS. IMM. GRANS. 0.2 (H) 0.00 - 0.04 K/UL    DF AUTOMATED     METABOLIC PANEL, COMPREHENSIVE    Collection Time: 09/14/22  9:54 AM   Result Value Ref Range    Sodium 134 (L) 136 - 145 mmol/L    Potassium 5.1 3.5 - 5.1 mmol/L    Chloride 100 97 - 108 mmol/L    CO2 28 21 - 32 mmol/L    Anion gap 6 5 - 15 mmol/L    Glucose 383 (H) 65 - 100 mg/dL    BUN 20 6 - 20 MG/DL    Creatinine 1.15 0.70 - 1.30 MG/DL    BUN/Creatinine ratio 17 12 - 20      GFR est AA >60 >60 ml/min/1.73m2    GFR est non-AA >60 >60 ml/min/1.73m2    Calcium 9.1 8.5 - 10.1 MG/DL    Bilirubin, total 0.5 0.2 - 1.0 MG/DL    ALT (SGPT) 58 12 - 78 U/L    AST (SGOT) 26 15 - 37 U/L    Alk.  phosphatase 105 45 - 117 U/L    Protein, total 6.9 6.4 - 8.2 g/dL    Albumin 3.4 (L) 3.5 - 5.0 g/dL    Globulin 3.5 2.0 - 4.0 g/dL    A-G Ratio 1.0 (L) 1.1 - 2.2     NT-PRO BNP    Collection Time: 09/14/22  9:54 AM   Result Value Ref Range    NT pro- (H) <125 PG/ML   TROPONIN-HIGH SENSITIVITY    Collection Time: 09/14/22  9:54 AM   Result Value Ref Range    Troponin-High Sensitivity 24 0 - 76 ng/L       Radiologic Studies -   XR CHEST PORT   Final Result   No acute process. CT Results  (Last 48 hours)      None          CXR Results  (Last 48 hours)                 09/14/22 1014  XR CHEST PORT Final result    Impression:  No acute process. Narrative:  INDICATION: chest pain       EXAM:  AP CHEST RADIOGRAPH       COMPARISON: January 13, 2022       FINDINGS:       AP portable view of the chest demonstrates a normal cardiomediastinal   silhouette. There is no edema, effusion, consolidation, or pneumothorax. The   osseous structures are unremarkable. Medical Decision Making   I am the first provider for this patient. I reviewed the vital signs, available nursing notes, past medical history, past surgical history, family history and social history. Vital Signs-Reviewed the patient's vital signs. Patient Vitals for the past 24 hrs:   Temp Pulse Resp BP SpO2   09/14/22 1045 -- 78 11 135/75 98 %   09/14/22 1015 -- 79 18 121/75 96 %   09/14/22 0945 98.8 °F (37.1 °C) 79 12 (!) 173/90 97 %         Provider Notes (Medical Decision Making):   69-year-old male presenting with chest pain. Substernal, nonexertional pain that is worse after eating bologna and lying flat with history of gastric problems is concerning for possible gastritis or GERD, esophageal spasm. However, he does have significant cardiac history, differential includes ACS. He is saturating well on room air, not tachycardic, unlikely PE, lungs clear to auscultation, no leg swelling, unlikely CHF. He received aspirin prior to arrival, will be given GI cocktail and nitroglycerin and Tylenol. ED Course:     Initial assessment performed.  The patients presenting problems have been discussed, and they are in agreement with the care plan formulated and outlined with them. I have encouraged them to ask questions as they arise throughout their visit. ED Course as of 09/15/22 0605   Wed Sep 14, 2022   0957 EKG is performed at 9: 48, shows sinus rhythm at a rate of 77, , QRS 86, QTc 430, axis upright, no ST segment elevation or depression concerning for ACS, T wave inversion in V6, this is interpreted as sinus rhythm with T wave inversion. [CM]   3811 On comparison to prior EKG in December 2021, borderline T wave inversion in V6 also seen at that time. He also had T wave inversion in lead I and aVL, no longer see T wave inversion in lead I on this EKG. [CM]   6701 Chart reviewed, had cardiac catheterization in 2021, had stent placed to circumflex marginal at that time [CM]   1231 Repeat EKG is performed at 12: 27, shows sinus rhythm at a rate of 74, , QRS 88, QTc 424, axis upright, no ST segment elevation or depression concerning for ACS, stable appearing T wave changes in V6, continue to inversion in aVL, this is interpreted as sinus rhythm with stable T wave abnormalities. [CM]      ED Course User Index  [CM] Teresa Candelario MD      CBC negative for leukocytosis or anemia, basic metabolic panel with normal renal function, no worrisome electrolyte abnormalities other than hyperglycemia. Patient states that he has had trouble controlling his sugars over the last several months, working with his primary care doctor to control these. His initial troponin is not significantly elevated at 24. Chest x-ray is unremarkable. He is given insulin and IV fluids, and repeat troponin is not significantly changed at 22. She is resting comfortably on reevaluation, and is chest pain-free. Counseled on compliance with his diabetic medications, and need to follow-up within the next few days with his endocrinologist.  Patient is counseled on supportive care and return precautions.  Will return to the ED for any worsening pain, shortness of breath, or any new or worrisome symptoms. Will followup with cardiology, primary care doctor within 2 days. Critical Care Time:         Disposition:  Home    PLAN:  1. Current Discharge Medication List        2.    Follow-up Information    None       Return to ED if worse     Diagnosis     Clinical Impression: Acute chest pain with history of dyspepsia, acute hyperglycemia with history of poorly controlled diabetes

## 2022-09-30 ENCOUNTER — DOCUMENTATION ONLY (OUTPATIENT)
Dept: SLEEP MEDICINE | Age: 42
End: 2022-09-30

## 2022-10-05 ENCOUNTER — DOCUMENTATION ONLY (OUTPATIENT)
Dept: SLEEP MEDICINE | Age: 42
End: 2022-10-05

## 2022-10-05 ENCOUNTER — VIRTUAL VISIT (OUTPATIENT)
Dept: SLEEP MEDICINE | Age: 42
End: 2022-10-05
Payer: COMMERCIAL

## 2022-10-05 DIAGNOSIS — G47.33 OSA (OBSTRUCTIVE SLEEP APNEA): Primary | ICD-10-CM

## 2022-10-05 PROCEDURE — 99214 OFFICE O/P EST MOD 30 MIN: CPT | Performed by: NURSE PRACTITIONER

## 2022-10-05 NOTE — PROGRESS NOTES
Blu Gonzalez (: 1980) is a 36 y.o. male, established patient, seen for positive airway pressure follow-up, he was last seen by me on 2022, previously seen by Dr. Lexi Carter on 2022, prior notes and sleep tests reviewed in detail. Home sleep test 2021 showed AHI of 76.3/hr with a lowest SaO2 of 71%, duration of SaO2 < 88% 70 min. Subsequent titration 2021 showed CPAP failure with adequate treatment on BiPAP. ASSESSMENT/PLAN:    ICD-10-CM ICD-9-CM    1. WALLY (obstructive sleep apnea)  G47.33 327.23           AHI = 76.3(2021). On ResMed Bi - Level, Resmed :   Max IPAP 25 cmH2O; Min EPAP 11 cmH2O; PS 4 cmH2O. Set up 2021. He is not compliant with PAP therapy although when used PAP shows benefit to the patient and remains necessary for control of his sleep apnea. There is continued clinical benefit from the hours of use demonstrated by AHI reduced from 76.3/hr to 5.8/hr. Follow-up and Dispositions    Return in about 3 months (around 2023) for 3 month compliance . Sleep Apnea -   Continue on current pressures, he will try to use mask and follow up with us if he is able to start using device to adjust pressure if needed. * Counseling was provided regarding the importance of regular PAP use with emphasis on ensuring sufficient total sleep time, proper sleep hygiene, and safe driving. * He was asked to contact our office for any problems regarding PAP therapy. 2. Encouraged continued weight management program through appropriate diet and exercise regimen as significant weight reduction has been shown to reduce severity of obstructive sleep apnea. SUBJECTIVE/OBJECTIVE:    He  is seen today for follow up on PAP device and reports problems using the device. He had noted tolerance issues at last visit after he had a panic attack while wearing the mask earlier this year.   We had discussed trial of new mask and he was fitted with a F&P Flakito FFM in sleep office for him to trial at home, starting with brief periods and removing if he felt anxious. We had also discussed alternatives to PAP therapy which are limited due to the severity of his apnea and his current weight. Both oral appliance and inspire were reviewed as alternatives given his CPAP intolerance issues. Review of device download indicates no usage since prior visit last month. He reports that his sleep schedule has been irregular and he has been afraid to try the mask. We discussed trying the mask without the hose attached to help get use to the feel of the mask. He reports that he is not having issues with dozing and his prior sleepiness while driving has not been an issue of late. We discussed need to use caution when driving if he is not feeling rested. [place] Sleepiness Score: (P) 14 and Modified F.O.S.Q. Score Total / 2: (P) 16 which reflects unchanged sleep quality over therapy time. He reports that he is not sleepy when driving. CO2 28 mmol/L on 9/14/22 labs    Sleep Review of Systems: notable for Positive difficulty falling asleep; Positive awakenings at night; Negative early morning headaches; Negative memory problems; Negative concentration issues; Negative chest pain; Negative shortness of breath; Negative significant joint pain at night; Negative rashes or itching; Positive heartburn; Negative significant mood issues; occasional afternoon naps     Vitals reported by patient   Patient-Reported Vitals 10/5/2022   Patient-Reported Weight 255   Patient-Reported Height -   Patient-Reported Temperature -      Calculated BMI 37    Physical Exam completed by visual and auditory observation of patient with verbal input from patient.     General:   Alert, oriented, not in acute distress   Eyes:  Anicteric Sclerae; no obvious strabismus   Nose:  No obvious nasal septum deviation    Neck:   Midline trachea, no visible mass   Chest/Lungs:  Respiratory effort normal, no visualized signs of difficulty breathing or respiratory distress   CVS:  No JVD   Extremities:  No obvious rashes noted on face, neck, or hands   Neuro:  No facial asymmetry, no focal deficits; no obvious tremor    Psych:  Normal affect,  normal countenance       Pino Valenzuela, was evaluated through a synchronous (real-time) audio-video encounter. The patient (or guardian if applicable) is aware that this is a billable service, which includes applicable co-pays. This Virtual Visit was conducted with patient's (and/or legal guardian's) consent. The visit was conducted pursuant to the emergency declaration under the [address] [place] [address], [address] [place] [address] [facility] authority and the [employer] General Act. Patient identification was verified, and a caregiver was present when appropriate. The patient was located at: Home: [facility]. [id number]  The provider was located at: Facility (Appt Department): [address] [place] [address]      On this date 10/05/2022 I have spent 30 minutes reviewing previous notes, test results and face to face with the patient discussing the diagnosis and importance of compliance with the treatment plan as well as documenting on the day of the visit. An electronic signature was used to authenticate this note.     -- Garvin Habermann, NP, [facility]  10/05/22

## 2022-10-05 NOTE — PATIENT INSTRUCTIONS
[address] [facility] [place]. [place], [address]  Tel.  [phone]  Fax. [phone] [facility]  [place], [address] [facility]  Tel.  [phone]  Fax. [phone] [address]  Tel.  [phone]  Fax. [phone]     Learning About CPAP for Sleep Apnea  What is CPAP? CPAP is a small machine that you use at home every night while you sleep. It increases air pressure in your throat to keep your airway open. When you have sleep apnea, this can help you sleep better so you feel much better. CPAP stands for \"continuous positive airway pressure. \"  The CPAP machine will have one of the following:  A mask that covers your nose and mouth  Prongs that fit into your nose  A mask that covers your nose only, the most common type. This type is called NCPAP. The N stands for \"nasal.\"  Why is it done? CPAP is usually the best treatment for obstructive sleep apnea. It is the first treatment choice and the most widely used. Your doctor may suggest CPAP if you have: Moderate to severe sleep apnea. Sleep apnea and coronary artery disease (CAD) or heart failure. How does it help? CPAP can help you have more normal sleep, so you feel less sleepy and more alert during the daytime. CPAP may help keep heart failure or other heart problems from getting worse. NCPAP may help lower your blood pressure. If you use CPAP, your bed partner may also sleep better because you are not snoring or restless. What are the side effects? Some people who use CPAP have:  A dry or stuffy nose and a sore throat. Irritated skin on the face. Sore eyes. Bloating. If you have any of these problems, work with your doctor to fix them. Here are some things you can try:  Be sure the mask or nasal prongs fit well. See if your doctor can adjust the pressure of your CPAP. If your nose is dry, try a humidifier.   If your nose is runny or stuffy, try decongestant medicine or a steroid nasal spray. If these things do not help, you might try a different type of machine. Some machines have air pressure that adjusts on its own. Others have air pressures that are different when you breathe in than when you breathe out. This may reduce discomfort caused by too much pressure in your nose. Where can you learn more? Go to [employer]be  Enter Denise Langford in the search box to learn more about \"Learning About CPAP for Sleep Apnea. \"   © 9281-5818 Healthwise, Incorporated. Care instructions adapted under license by [facility] (which disclaims liability or warranty for this information). This care instruction is for use with your licensed healthcare professional. If you have questions about a medical condition or this instruction, always ask your healthcare professional. [address] any warranty or liability for your use of this information. Content Version: 7.4.46767; Last Revised: January 11, 2010  PROPER SLEEP HYGIENE    What to avoid  Do not have drinks with caffeine, such as coffee or black tea, for 8 hours before bed. Do not smoke or use other types of tobacco near bedtime. Nicotine is a stimulant and can keep you awake. Avoid drinking alcohol late in the evening, because it can cause you to wake in the middle of the night. Do not eat a big meal close to bedtime. If you are hungry, eat a light snack. Do not drink a lot of water close to bedtime, because the need to urinate may wake you up during the night. Do not read or watch TV in bed. Use the bed only for sleeping and sexual activity. What to try  Go to bed at the same time every night, and wake up at the same time every morning. Do not take naps during the day. Keep your bedroom quiet, dark, and cool. Get regular exercise, but not within 3 to 4 hours of your bedtime. .  Sleep on a comfortable pillow and mattress.   If watching the clock makes you anxious, turn it facing away from you so you cannot see the time. If you worry when you lie down, start a worry book. Well before bedtime, write down your worries, and then set the book and your concerns aside. Try meditation or other relaxation techniques before you go to bed. If you cannot fall asleep, get up and go to another room until you feel sleepy. Do something relaxing. Repeat your bedtime routine before you go to bed again. Make your house quiet and calm about an hour before bedtime. Turn down the lights, turn off the TV, log off the computer, and turn down the volume on music. This can help you relax after a busy day. Drowsy Driving: The [place] [address] cites drowsiness as a causing factor in more than 867,227 police reported crashes annually, resulting in 76,000 injuries and 1,500 deaths. Other surveys suggest 55% of people polled have driven while drowsy in the past year, 23% had fallen asleep but not crashed, 3% crashed, and 2% had and accident due to drowsy driving. Who is at risk? Young Drivers: One study of drowsy driving accidents states that 55% of the drivers were under 25 years. Of those, 75% were male. Shift Workers and Travelers: People who work overnight or travel across time zones frequently are at higher risk of experiencing Circadian Rhythm Disorders. They are trying to work and function when their body is programed to sleep. Sleep Deprived: Lack of sleep has a serious impact on your ability to pay attention or focus on a task. Consistently getting less than the average of 8 hours your body needs creates partial or cumulative sleep deprivation. Untreated Sleep Disorders: Sleep Apnea, Narcolepsy, R.L.S., and other sleep disorders (untreated) prevent a person from getting enough restful sleep. This leads to excessive daytime sleepiness and increases the risk for drowsy driving accidents by up to 7 times.   Medications / Alcohol: Even over the counter medications can cause drowsiness. Medications that impair a drivers attention should have a warning label. Alcohol naturally makes you sleepy and on its own can cause accidents. Combined with excessive drowsiness its effects are amplified. Signs of Drowsy Driving:   * You don't remember driving the last few miles   * You may drift out of your sheyla   * You are unable to focus and your thoughts wander   * You may yawn more often than normal   * You have difficulty keeping your eyes open / nodding off   * Missing traffic signs, speeding, or tailgating  Prevention-   Good sleep hygiene, lifestyle and behavioral choices have the most impact on drowsy driving. There is no substitute for sleep and the average person requires 8 hours nightly. If you find yourself driving drowsy, stop and sleep. Consider the sleep hygiene tips provided during your visit as well. Medication Refill Policy: Refills for all medications require 1 week advance notice. Please have your pharmacy fax a refill request. We are unable to fax, or call in \"controled substance\" medications and you will need to pick these prescriptions up from our office. [employer] Activation    Thank you for requesting access to [employer]. Please follow the instructions below to securely access and download your online medical record. [employer] allows you to send messages to your doctor, view your test results, renew your prescriptions, schedule appointments, and more. How Do I Sign Up? In your internet browser, go to [email or web address]. Click on the First Time User? Click Here link in the Sign In box. You will see the New Member Sign Up page. Enter your [employer] Access Code exactly as it appears below. You will not need to use this code after youve completed the sign-up process. If you do not sign up before the expiration date, you must request a new code. [employer] Access Code:  Activation code not generated  Current [employer] Status: Active (This is the date your [employer] access code will )    Enter the last four digits of your Social Security Number (xxxx) and Date of Birth (mm/dd/yyyy) as indicated and click Submit. You will be taken to the next sign-up page. Create a [employer] ID. This will be your [employer] login ID and cannot be changed, so think of one that is secure and easy to remember. Create a [employer] password. You can change your password at any time. Enter your Password Reset Question and Answer. This can be used at a later time if you forget your password. Enter your e-mail address. You will receive e-mail notification when new information is available in [address] Click Sign Up. You can now view and download portions of your medical record. Click the [employer] link to download a portable copy of your medical information. Additional Information    If you have questions, please call [phone]. Remember, [employer] is NOT to be used for urgent needs. For medical emergencies, dial 911.

## 2022-10-06 RX ORDER — LIDOCAINE 40 MG/G
CREAM TOPICAL
Qty: 15 G | Refills: 1 | Status: SHIPPED | OUTPATIENT
Start: 2022-10-06

## 2022-10-10 ENCOUNTER — APPOINTMENT (OUTPATIENT)
Dept: CARDIAC REHAB | Age: 42
End: 2022-10-10

## 2022-10-13 ENCOUNTER — VIRTUAL VISIT (OUTPATIENT)
Dept: BEHAVIORAL/MENTAL HEALTH CLINIC | Age: 42
End: 2022-10-13
Payer: COMMERCIAL

## 2022-10-13 DIAGNOSIS — F43.10 PTSD (POST-TRAUMATIC STRESS DISORDER): ICD-10-CM

## 2022-10-13 DIAGNOSIS — F43.29 ADJUSTMENT DISORDER WITH MIXED EMOTIONAL FEATURES: Primary | ICD-10-CM

## 2022-10-13 DIAGNOSIS — G47.00 INSOMNIA DISORDER WITH NON-SLEEP DISORDER MENTAL COMORBIDITY: ICD-10-CM

## 2022-10-13 DIAGNOSIS — F41.1 GAD (GENERALIZED ANXIETY DISORDER): ICD-10-CM

## 2022-10-13 PROCEDURE — 99214 OFFICE O/P EST MOD 30 MIN: CPT | Performed by: NURSE PRACTITIONER

## 2022-10-13 RX ORDER — SERTRALINE HYDROCHLORIDE 100 MG/1
100 TABLET, FILM COATED ORAL DAILY
Qty: 30 TABLET | Refills: 2 | Status: SHIPPED | OUTPATIENT
Start: 2022-10-13

## 2022-10-13 RX ORDER — LORAZEPAM 1 MG/1
1 TABLET ORAL
Qty: 90 TABLET | Refills: 2 | Status: SHIPPED | OUTPATIENT
Start: 2022-10-13

## 2022-10-13 RX ORDER — SERTRALINE HYDROCHLORIDE 50 MG/1
50 TABLET, FILM COATED ORAL DAILY
Qty: 30 TABLET | Refills: 2 | Status: SHIPPED | OUTPATIENT
Start: 2022-10-13

## 2022-10-19 ENCOUNTER — APPOINTMENT (OUTPATIENT)
Dept: CARDIAC REHAB | Age: 42
End: 2022-10-19

## 2022-10-24 ENCOUNTER — APPOINTMENT (OUTPATIENT)
Dept: CARDIAC REHAB | Age: 42
End: 2022-10-24

## 2022-10-26 ENCOUNTER — APPOINTMENT (OUTPATIENT)
Dept: CARDIAC REHAB | Age: 42
End: 2022-10-26

## 2022-10-31 ENCOUNTER — APPOINTMENT (OUTPATIENT)
Dept: CARDIAC REHAB | Age: 42
End: 2022-10-31

## 2022-10-31 RX ORDER — INSULIN GLARGINE 300 U/ML
INJECTION, SOLUTION SUBCUTANEOUS
Qty: 30 ML | Refills: 5 | Status: ON HOLD | OUTPATIENT
Start: 2022-10-31

## 2022-11-02 ENCOUNTER — APPOINTMENT (OUTPATIENT)
Dept: CARDIAC REHAB | Age: 42
End: 2022-11-02

## 2022-11-15 ENCOUNTER — OFFICE VISIT (OUTPATIENT)
Dept: INTERNAL MEDICINE CLINIC | Age: 42
End: 2022-11-15
Payer: COMMERCIAL

## 2022-11-15 ENCOUNTER — HOSPITAL ENCOUNTER (INPATIENT)
Age: 42
LOS: 24 days | Discharge: HOME | End: 2022-12-09
Attending: EMERGENCY MEDICINE | Admitting: THORACIC SURGERY (CARDIOTHORACIC VASCULAR SURGERY)
Payer: COMMERCIAL

## 2022-11-15 ENCOUNTER — APPOINTMENT (OUTPATIENT)
Dept: GENERAL RADIOLOGY | Age: 42
End: 2022-11-15
Attending: EMERGENCY MEDICINE
Payer: COMMERCIAL

## 2022-11-15 VITALS
DIASTOLIC BLOOD PRESSURE: 63 MMHG | SYSTOLIC BLOOD PRESSURE: 98 MMHG | OXYGEN SATURATION: 97 % | HEIGHT: 69 IN | WEIGHT: 257 LBS | BODY MASS INDEX: 38.06 KG/M2 | TEMPERATURE: 98.1 F | HEART RATE: 83 BPM | RESPIRATION RATE: 18 BRPM

## 2022-11-15 DIAGNOSIS — Z95.1 S/P CABG X 1: ICD-10-CM

## 2022-11-15 DIAGNOSIS — Z95.2 S/P AVR (AORTIC VALVE REPLACEMENT) AND AORTOPLASTY: ICD-10-CM

## 2022-11-15 DIAGNOSIS — D72.825 BANDEMIA: ICD-10-CM

## 2022-11-15 DIAGNOSIS — I20.0 UNSTABLE ANGINA (HCC): ICD-10-CM

## 2022-11-15 DIAGNOSIS — R50.9 FEVER, UNSPECIFIED FEVER CAUSE: ICD-10-CM

## 2022-11-15 DIAGNOSIS — N17.9 AKI (ACUTE KIDNEY INJURY) (HCC): ICD-10-CM

## 2022-11-15 DIAGNOSIS — J96.01 ACUTE RESPIRATORY FAILURE WITH HYPOXIA (HCC): ICD-10-CM

## 2022-11-15 DIAGNOSIS — I21.4 NSTEMI (NON-ST ELEVATED MYOCARDIAL INFARCTION) (HCC): Primary | ICD-10-CM

## 2022-11-15 DIAGNOSIS — F41.9 ANXIETY: ICD-10-CM

## 2022-11-15 DIAGNOSIS — R79.89 LFT ELEVATION: ICD-10-CM

## 2022-11-15 DIAGNOSIS — U07.1 LAB TEST POSITIVE FOR DETECTION OF COVID-19 VIRUS: ICD-10-CM

## 2022-11-15 DIAGNOSIS — R07.9 CHEST PAIN: ICD-10-CM

## 2022-11-15 DIAGNOSIS — I10 ESSENTIAL HYPERTENSION: ICD-10-CM

## 2022-11-15 DIAGNOSIS — E10.65 POOR CONTROL TYPE I DIABETES MELLITUS (HCC): ICD-10-CM

## 2022-11-15 DIAGNOSIS — I25.111 CORONARY ARTERY DISEASE INVOLVING NATIVE HEART WITH ANGINA PECTORIS AND DOCUMENTED SPASM, UNSPECIFIED VESSEL OR LESION TYPE (HCC): ICD-10-CM

## 2022-11-15 DIAGNOSIS — I06.0 RHEUMATIC AORTIC STENOSIS: ICD-10-CM

## 2022-11-15 DIAGNOSIS — I25.9 CHEST PAIN DUE TO MYOCARDIAL ISCHEMIA, UNSPECIFIED ISCHEMIC CHEST PAIN TYPE: ICD-10-CM

## 2022-11-15 DIAGNOSIS — I35.0 AORTIC VALVE STENOSIS, ETIOLOGY OF CARDIAC VALVE DISEASE UNSPECIFIED: ICD-10-CM

## 2022-11-15 DIAGNOSIS — R07.9 CHEST PAIN, UNSPECIFIED TYPE: ICD-10-CM

## 2022-11-15 DIAGNOSIS — E10.59 TYPE 1 DIABETES MELLITUS WITH OTHER CIRCULATORY COMPLICATION (HCC): ICD-10-CM

## 2022-11-15 DIAGNOSIS — R07.9 CHEST PAIN, UNSPECIFIED TYPE: Primary | ICD-10-CM

## 2022-11-15 LAB
ALBUMIN SERPL-MCNC: 3.8 G/DL (ref 3.5–5)
ALBUMIN/GLOB SERPL: 1.1 {RATIO} (ref 1.1–2.2)
ALP SERPL-CCNC: 112 U/L (ref 45–117)
ALT SERPL-CCNC: 48 U/L (ref 12–78)
ANION GAP SERPL CALC-SCNC: 3 MMOL/L (ref 5–15)
APTT PPP: 26.5 SEC (ref 22.1–31)
AST SERPL-CCNC: 17 U/L (ref 15–37)
BASOPHILS # BLD: 0 K/UL (ref 0–0.1)
BASOPHILS # BLD: 0.1 K/UL (ref 0–0.1)
BASOPHILS NFR BLD: 1 % (ref 0–1)
BASOPHILS NFR BLD: 1 % (ref 0–1)
BILIRUB SERPL-MCNC: 0.5 MG/DL (ref 0.2–1)
BNP SERPL-MCNC: 208 PG/ML
BUN SERPL-MCNC: 21 MG/DL (ref 6–20)
BUN/CREAT SERPL: 19 (ref 12–20)
CALCIUM SERPL-MCNC: 9.3 MG/DL (ref 8.5–10.1)
CHLORIDE SERPL-SCNC: 98 MMOL/L (ref 97–108)
CO2 SERPL-SCNC: 31 MMOL/L (ref 21–32)
COMMENT, HOLDF: NORMAL
CREAT SERPL-MCNC: 1.1 MG/DL (ref 0.7–1.3)
DIFFERENTIAL METHOD BLD: ABNORMAL
DIFFERENTIAL METHOD BLD: ABNORMAL
EOSINOPHIL # BLD: 0.3 K/UL (ref 0–0.4)
EOSINOPHIL # BLD: 0.3 K/UL (ref 0–0.4)
EOSINOPHIL NFR BLD: 4 % (ref 0–7)
EOSINOPHIL NFR BLD: 4 % (ref 0–7)
ERYTHROCYTE [DISTWIDTH] IN BLOOD BY AUTOMATED COUNT: 14 % (ref 11.5–14.5)
ERYTHROCYTE [DISTWIDTH] IN BLOOD BY AUTOMATED COUNT: 14.2 % (ref 11.5–14.5)
GLOBULIN SER CALC-MCNC: 3.4 G/DL (ref 2–4)
GLUCOSE BLD STRIP.AUTO-MCNC: 171 MG/DL (ref 65–117)
GLUCOSE SERPL-MCNC: 297 MG/DL (ref 65–100)
HCT VFR BLD AUTO: 38.8 % (ref 36.6–50.3)
HCT VFR BLD AUTO: 39.7 % (ref 36.6–50.3)
HGB BLD-MCNC: 13.2 G/DL (ref 12.1–17)
HGB BLD-MCNC: 13.2 G/DL (ref 12.1–17)
IMM GRANULOCYTES # BLD AUTO: 0.1 K/UL (ref 0–0.04)
IMM GRANULOCYTES # BLD AUTO: 0.1 K/UL (ref 0–0.04)
IMM GRANULOCYTES NFR BLD AUTO: 1 % (ref 0–0.5)
IMM GRANULOCYTES NFR BLD AUTO: 1 % (ref 0–0.5)
LYMPHOCYTES # BLD: 2 K/UL (ref 0.8–3.5)
LYMPHOCYTES # BLD: 2.1 K/UL (ref 0.8–3.5)
LYMPHOCYTES NFR BLD: 24 % (ref 12–49)
LYMPHOCYTES NFR BLD: 24 % (ref 12–49)
MCH RBC QN AUTO: 27.5 PG (ref 26–34)
MCH RBC QN AUTO: 27.7 PG (ref 26–34)
MCHC RBC AUTO-ENTMCNC: 33.2 G/DL (ref 30–36.5)
MCHC RBC AUTO-ENTMCNC: 34 G/DL (ref 30–36.5)
MCV RBC AUTO: 81.3 FL (ref 80–99)
MCV RBC AUTO: 82.7 FL (ref 80–99)
MONOCYTES # BLD: 0.6 K/UL (ref 0–1)
MONOCYTES # BLD: 0.7 K/UL (ref 0–1)
MONOCYTES NFR BLD: 7 % (ref 5–13)
MONOCYTES NFR BLD: 9 % (ref 5–13)
NEUTS SEG # BLD: 5.3 K/UL (ref 1.8–8)
NEUTS SEG # BLD: 5.4 K/UL (ref 1.8–8)
NEUTS SEG NFR BLD: 61 % (ref 32–75)
NEUTS SEG NFR BLD: 63 % (ref 32–75)
NRBC # BLD: 0 K/UL (ref 0–0.01)
NRBC # BLD: 0 K/UL (ref 0–0.01)
NRBC BLD-RTO: 0 PER 100 WBC
NRBC BLD-RTO: 0 PER 100 WBC
PLATELET # BLD AUTO: 179 K/UL (ref 150–400)
PLATELET # BLD AUTO: 180 K/UL (ref 150–400)
PMV BLD AUTO: 10.1 FL (ref 8.9–12.9)
PMV BLD AUTO: 10.1 FL (ref 8.9–12.9)
POTASSIUM SERPL-SCNC: 4.4 MMOL/L (ref 3.5–5.1)
PROT SERPL-MCNC: 7.2 G/DL (ref 6.4–8.2)
RBC # BLD AUTO: 4.77 M/UL (ref 4.1–5.7)
RBC # BLD AUTO: 4.8 M/UL (ref 4.1–5.7)
SAMPLES BEING HELD,HOLD: NORMAL
SERVICE CMNT-IMP: ABNORMAL
SODIUM SERPL-SCNC: 132 MMOL/L (ref 136–145)
THERAPEUTIC RANGE,PTTT: NORMAL SECS (ref 58–77)
TROPONIN-HIGH SENSITIVITY: 714 NG/L (ref 0–76)
UFH PPP CHRO-ACNC: <0.1 IU/ML
WBC # BLD AUTO: 8.3 K/UL (ref 4.1–11.1)
WBC # BLD AUTO: 8.7 K/UL (ref 4.1–11.1)

## 2022-11-15 PROCEDURE — 85520 HEPARIN ASSAY: CPT

## 2022-11-15 PROCEDURE — 85025 COMPLETE CBC W/AUTO DIFF WBC: CPT

## 2022-11-15 PROCEDURE — 99214 OFFICE O/P EST MOD 30 MIN: CPT | Performed by: INTERNAL MEDICINE

## 2022-11-15 PROCEDURE — 71045 X-RAY EXAM CHEST 1 VIEW: CPT

## 2022-11-15 PROCEDURE — 65270000029 HC RM PRIVATE

## 2022-11-15 PROCEDURE — 85730 THROMBOPLASTIN TIME PARTIAL: CPT

## 2022-11-15 PROCEDURE — 82962 GLUCOSE BLOOD TEST: CPT

## 2022-11-15 PROCEDURE — 84484 ASSAY OF TROPONIN QUANT: CPT

## 2022-11-15 PROCEDURE — 74011250636 HC RX REV CODE- 250/636: Performed by: NURSE PRACTITIONER

## 2022-11-15 PROCEDURE — 74011250637 HC RX REV CODE- 250/637: Performed by: INTERNAL MEDICINE

## 2022-11-15 PROCEDURE — 74011250636 HC RX REV CODE- 250/636: Performed by: INTERNAL MEDICINE

## 2022-11-15 PROCEDURE — 36415 COLL VENOUS BLD VENIPUNCTURE: CPT

## 2022-11-15 PROCEDURE — 99285 EMERGENCY DEPT VISIT HI MDM: CPT

## 2022-11-15 PROCEDURE — 93005 ELECTROCARDIOGRAM TRACING: CPT

## 2022-11-15 PROCEDURE — 80053 COMPREHEN METABOLIC PANEL: CPT

## 2022-11-15 PROCEDURE — 83880 ASSAY OF NATRIURETIC PEPTIDE: CPT

## 2022-11-15 PROCEDURE — 74011000250 HC RX REV CODE- 250: Performed by: INTERNAL MEDICINE

## 2022-11-15 RX ORDER — INSULIN GLARGINE 100 [IU]/ML
50 INJECTION, SOLUTION SUBCUTANEOUS DAILY
Status: DISCONTINUED | OUTPATIENT
Start: 2022-11-16 | End: 2022-11-16

## 2022-11-15 RX ORDER — HEPARIN SODIUM 1000 [USP'U]/ML
2000 INJECTION, SOLUTION INTRAVENOUS; SUBCUTANEOUS AS NEEDED
Status: DISCONTINUED | OUTPATIENT
Start: 2022-11-15 | End: 2022-11-22

## 2022-11-15 RX ORDER — CLOPIDOGREL BISULFATE 75 MG/1
75 TABLET ORAL DAILY
Status: DISCONTINUED | OUTPATIENT
Start: 2022-11-16 | End: 2022-11-22

## 2022-11-15 RX ORDER — SODIUM CHLORIDE 0.9 % (FLUSH) 0.9 %
5-40 SYRINGE (ML) INJECTION EVERY 8 HOURS
Status: DISCONTINUED | OUTPATIENT
Start: 2022-11-15 | End: 2022-11-18 | Stop reason: SDUPTHER

## 2022-11-15 RX ORDER — FUROSEMIDE 10 MG/ML
20 INJECTION INTRAMUSCULAR; INTRAVENOUS 2 TIMES DAILY
Status: DISCONTINUED | OUTPATIENT
Start: 2022-11-15 | End: 2022-11-15

## 2022-11-15 RX ORDER — HEPARIN SODIUM 1000 [USP'U]/ML
4000 INJECTION, SOLUTION INTRAVENOUS; SUBCUTANEOUS ONCE
Status: COMPLETED | OUTPATIENT
Start: 2022-11-15 | End: 2022-11-15

## 2022-11-15 RX ORDER — INSULIN GLARGINE 100 [IU]/ML
50 INJECTION, SOLUTION SUBCUTANEOUS
Status: DISCONTINUED | OUTPATIENT
Start: 2022-11-15 | End: 2022-11-16

## 2022-11-15 RX ORDER — ACETAMINOPHEN 650 MG/1
650 SUPPOSITORY RECTAL
Status: DISCONTINUED | OUTPATIENT
Start: 2022-11-15 | End: 2022-11-22

## 2022-11-15 RX ORDER — ALPRAZOLAM 0.5 MG/1
0.5 TABLET ORAL
Status: COMPLETED | OUTPATIENT
Start: 2022-11-15 | End: 2022-11-15

## 2022-11-15 RX ORDER — INSULIN GLARGINE 100 [IU]/ML
50 INJECTION, SOLUTION SUBCUTANEOUS
Status: DISCONTINUED | OUTPATIENT
Start: 2022-11-15 | End: 2022-11-15

## 2022-11-15 RX ORDER — BUSPIRONE HYDROCHLORIDE 5 MG/1
15 TABLET ORAL 3 TIMES DAILY
Status: DISCONTINUED | OUTPATIENT
Start: 2022-11-15 | End: 2022-12-09 | Stop reason: HOSPADM

## 2022-11-15 RX ORDER — MAGNESIUM SULFATE 100 %
4 CRYSTALS MISCELLANEOUS AS NEEDED
Status: DISCONTINUED | OUTPATIENT
Start: 2022-11-15 | End: 2022-11-22

## 2022-11-15 RX ORDER — ROSUVASTATIN CALCIUM 40 MG/1
40 TABLET, COATED ORAL DAILY
Status: DISCONTINUED | OUTPATIENT
Start: 2022-11-16 | End: 2022-11-27

## 2022-11-15 RX ORDER — GUAIFENESIN 100 MG/5ML
162 LIQUID (ML) ORAL
Status: COMPLETED | OUTPATIENT
Start: 2022-11-15 | End: 2022-11-15

## 2022-11-15 RX ORDER — PANTOPRAZOLE SODIUM 40 MG/1
40 TABLET, DELAYED RELEASE ORAL
Status: DISCONTINUED | OUTPATIENT
Start: 2022-11-16 | End: 2022-11-27

## 2022-11-15 RX ORDER — INSULIN LISPRO 100 [IU]/ML
INJECTION, SOLUTION INTRAVENOUS; SUBCUTANEOUS
Status: DISCONTINUED | OUTPATIENT
Start: 2022-11-15 | End: 2022-11-21

## 2022-11-15 RX ORDER — INSULIN GLARGINE 100 [IU]/ML
100 INJECTION, SOLUTION SUBCUTANEOUS
Status: DISCONTINUED | OUTPATIENT
Start: 2022-11-15 | End: 2022-11-15 | Stop reason: SDUPTHER

## 2022-11-15 RX ORDER — ACETAMINOPHEN 325 MG/1
650 TABLET ORAL
Status: DISCONTINUED | OUTPATIENT
Start: 2022-11-15 | End: 2022-11-22

## 2022-11-15 RX ORDER — HEPARIN SODIUM 1000 [USP'U]/ML
4000 INJECTION, SOLUTION INTRAVENOUS; SUBCUTANEOUS AS NEEDED
Status: DISCONTINUED | OUTPATIENT
Start: 2022-11-15 | End: 2022-11-22

## 2022-11-15 RX ORDER — POLYETHYLENE GLYCOL 3350 17 G/17G
17 POWDER, FOR SOLUTION ORAL DAILY PRN
Status: DISCONTINUED | OUTPATIENT
Start: 2022-11-15 | End: 2022-11-22

## 2022-11-15 RX ORDER — ONDANSETRON 4 MG/1
4 TABLET, ORALLY DISINTEGRATING ORAL
Status: DISCONTINUED | OUTPATIENT
Start: 2022-11-15 | End: 2022-11-22

## 2022-11-15 RX ORDER — ASPIRIN 81 MG/1
81 TABLET ORAL DAILY
Status: DISCONTINUED | OUTPATIENT
Start: 2022-11-16 | End: 2022-11-22

## 2022-11-15 RX ORDER — ONDANSETRON 2 MG/ML
4 INJECTION INTRAMUSCULAR; INTRAVENOUS
Status: DISCONTINUED | OUTPATIENT
Start: 2022-11-15 | End: 2022-11-22

## 2022-11-15 RX ORDER — METOPROLOL SUCCINATE 25 MG/1
25 TABLET, EXTENDED RELEASE ORAL 2 TIMES DAILY
Status: DISCONTINUED | OUTPATIENT
Start: 2022-11-15 | End: 2022-11-22

## 2022-11-15 RX ORDER — HEPARIN SODIUM 10000 [USP'U]/100ML
8-25 INJECTION, SOLUTION INTRAVENOUS
Status: DISPENSED | OUTPATIENT
Start: 2022-11-15 | End: 2022-11-22

## 2022-11-15 RX ORDER — SODIUM CHLORIDE 0.9 % (FLUSH) 0.9 %
5-40 SYRINGE (ML) INJECTION AS NEEDED
Status: DISCONTINUED | OUTPATIENT
Start: 2022-11-15 | End: 2022-11-18 | Stop reason: SDUPTHER

## 2022-11-15 RX ORDER — BUSPIRONE HYDROCHLORIDE 7.5 MG/1
15 TABLET ORAL 3 TIMES DAILY
Status: DISCONTINUED | OUTPATIENT
Start: 2022-11-15 | End: 2022-11-15 | Stop reason: SDUPTHER

## 2022-11-15 RX ORDER — FUROSEMIDE 10 MG/ML
20 INJECTION INTRAMUSCULAR; INTRAVENOUS 2 TIMES DAILY
Status: DISPENSED | OUTPATIENT
Start: 2022-11-16 | End: 2022-11-16

## 2022-11-15 RX ORDER — FENTANYL CITRATE 50 UG/ML
50 INJECTION, SOLUTION INTRAMUSCULAR; INTRAVENOUS ONCE
Status: COMPLETED | OUTPATIENT
Start: 2022-11-15 | End: 2022-11-15

## 2022-11-15 RX ORDER — LORAZEPAM 1 MG/1
1 TABLET ORAL
Status: DISCONTINUED | OUTPATIENT
Start: 2022-11-15 | End: 2022-12-05

## 2022-11-15 RX ORDER — GUAIFENESIN 100 MG/5ML
81 LIQUID (ML) ORAL
Status: COMPLETED | OUTPATIENT
Start: 2022-11-15 | End: 2022-11-15

## 2022-11-15 RX ORDER — NITROGLYCERIN 0.4 MG/1
0.4 TABLET SUBLINGUAL
Status: COMPLETED | OUTPATIENT
Start: 2022-11-15 | End: 2022-11-15

## 2022-11-15 RX ORDER — INSULIN GLARGINE 100 [IU]/ML
100 INJECTION, SOLUTION SUBCUTANEOUS
Status: DISCONTINUED | OUTPATIENT
Start: 2022-11-15 | End: 2022-11-15 | Stop reason: DRUGHIGH

## 2022-11-15 RX ORDER — DEXTROSE MONOHYDRATE 100 MG/ML
0-250 INJECTION, SOLUTION INTRAVENOUS AS NEEDED
Status: DISCONTINUED | OUTPATIENT
Start: 2022-11-15 | End: 2022-11-22

## 2022-11-15 RX ORDER — SERTRALINE HYDROCHLORIDE 50 MG/1
100 TABLET, FILM COATED ORAL DAILY
Status: DISCONTINUED | OUTPATIENT
Start: 2022-11-16 | End: 2022-12-09 | Stop reason: HOSPADM

## 2022-11-15 RX ADMIN — HEPARIN SODIUM 4000 UNITS: 1000 INJECTION INTRAVENOUS; SUBCUTANEOUS at 19:45

## 2022-11-15 RX ADMIN — BUSPIRONE HYDROCHLORIDE 15 MG: 5 TABLET ORAL at 23:24

## 2022-11-15 RX ADMIN — ALPRAZOLAM 0.5 MG: 0.5 TABLET ORAL at 19:46

## 2022-11-15 RX ADMIN — FENTANYL CITRATE 50 MCG: 50 INJECTION, SOLUTION INTRAMUSCULAR; INTRAVENOUS at 21:24

## 2022-11-15 RX ADMIN — METOPROLOL SUCCINATE 25 MG: 25 TABLET, FILM COATED, EXTENDED RELEASE ORAL at 20:38

## 2022-11-15 RX ADMIN — ASPIRIN 162 MG: 81 TABLET, CHEWABLE ORAL at 19:47

## 2022-11-15 RX ADMIN — ASPIRIN 81 MG: 81 TABLET, CHEWABLE ORAL at 19:46

## 2022-11-15 RX ADMIN — NITROGLYCERIN 0.4 MG: 0.4 TABLET, ORALLY DISINTEGRATING SUBLINGUAL at 19:48

## 2022-11-15 RX ADMIN — SODIUM CHLORIDE, PRESERVATIVE FREE 10 ML: 5 INJECTION INTRAVENOUS at 23:29

## 2022-11-15 RX ADMIN — HEPARIN SODIUM 8 UNITS/KG/HR: 10000 INJECTION, SOLUTION INTRAVENOUS at 20:42

## 2022-11-15 NOTE — Clinical Note
Mitral stenosis of 50.50  Using 6 Indian Right 4 guide and 4 Indian glide catheter, dual transducers.

## 2022-11-15 NOTE — ED PROVIDER NOTES
EMERGENCY DEPARTMENT HISTORY AND PHYSICAL EXAM      Date: 11/15/2022  Patient Name: Sri Pereira    History of Presenting Illness     Chief Complaint   Patient presents with    Chest Pain     Pt ambulatory into triage with a cc chest pain x 1 month; PCP advised pt to come to ED for further evaluation        History Provided By: Patient    HPI: Sri Pereira, 36 y.o. male presents to the ED with cc of chest pain. Patient has a history of coronary artery disease, and states he has received 9 stents in the past.  He was seen in the ER previously, and had a negative work-up. He was told his symptoms were due to indigestion. For the last month, he has had intermittent pain which lasts up to 20 minutes at a time. He says it occurs with exertion primarily. He says that it can be a 7 out of 10 in severity. It usually starts in the mid chest, and radiates to both sides of the chest, both arms, occasionally to the back, and into the left jaw. It is associated with shortness of breath, nausea and occasional diaphoresis. He has been taking Maalox or similar medications with the symptoms. If he rests,  the symptoms will resolve within 20 minutes. Denies leg pain, leg edema, cough, fever or chills. Patient has a 2 out of 10 pain in the left chest now, states he believes that is due to anxiety. He has no radiation or shortness of breath at this time. There are no other complaints, changes, or physical findings at this time. PCP: Brittany Munson MD    No current facility-administered medications on file prior to encounter.      Current Outpatient Medications on File Prior to Encounter   Medication Sig Dispense Refill    metFORMIN (GLUCOPHAGE) 500 mg tablet TAKE 1 TABLET BY MOUTH TWICE DAILY WITH MEALS 180 Tablet 3    insulin glargine U-300 conc (Toujeo Max U-300 SoloStar) 300 unit/mL (3 mL) inpn Take 140 units every day (new dosage) 30 mL 5    LORazepam (ATIVAN) 1 mg tablet Take 1 Tablet by mouth every eight (8) hours as needed for Anxiety. Max Daily Amount: 3 mg. 90 Tablet 2    sertraline (ZOLOFT) 100 mg tablet Take 1 Tablet by mouth daily. 30 Tablet 2    sertraline (ZOLOFT) 50 mg tablet Take 1 Tablet by mouth daily. 30 Tablet 2    [DISCONTINUED] lidocaine (LMX 4) 4 % topical cream Apply  to affected area two (2) times daily as needed for Pain. (Patient not taking: Reported on 11/15/2022) 15 g 1    alum-mag hydroxide-simeth (MYLANTA) 200-200-20 mg/5 mL susp Take 30 mL by mouth every four (4) hours as needed for Indigestion. 150 mL 0    levothyroxine (SYNTHROID) 125 mcg tablet Take 1 Tablet by mouth Daily (before breakfast). 90 Tablet 3    insulin regular (NOVOLIN R, HUMULIN R) 100 unit/mL injection 40 units with each meal, plus correction. Max daily dose of 150 units. 40 mL 5    busPIRone (BUSPAR) 15 mg tablet Take 1 Tablet by mouth three (3) times daily. 90 Tablet 2    Insulin Needles, Disposable, 32 gauge x 5/32\" ndle 5 shots per day (dispense whatever brand is covered by his insurance) 500 Pen Needle 3    lisinopriL (PRINIVIL, ZESTRIL) 10 mg tablet Take 10 mg by mouth daily. testosterone (ANDROGEL) 20.25 mg/1.25 gram (1.62 %) gel Apply 4 pump presses daily. Ok to dispense generic testosterone. 2 Each 5    insulin U-500 syringe-needle (BD Insulin Syringe U-500) 1/2 mL 31 gauge x 15/64\" syrg Three shots per day. 300 Syringe 3    butalbital-acetaminophen-caffeine (FIORICET, ESGIC) -40 mg per tablet Take 1 Tablet by mouth every six (6) hours as needed for Headache. Indications: a migraine headache 10 Tablet 0    esomeprazole (NEXIUM) 40 mg capsule TAKE 1 CAPSULE BY MOUTH ONCE DAILY BEFORE BREAKFAST DO NOT OPEN CAPSULE      metoprolol succinate (TOPROL-XL) 25 mg XL tablet Take 25 mg by mouth two (2) times a day. rosuvastatin (CRESTOR) 40 mg tablet Take 40 mg by mouth daily. famotidine (PEPCID) 40 mg tablet Take 1 Tablet by mouth daily.  30 Tablet 3    clopidogreL (PLAVIX) 75 mg tab Take 1 Tablet by mouth daily. 30 Tablet 12    cholecalciferol (Vitamin D3) (5000 Units/125 mcg) tab tablet Take 1 Tab by mouth daily. 90 Tab 1    Insulin Syringe-Needle U-100 (BD Insulin Syringe) 1 mL 25 x 1\" syrg Use for drawing up/injecting testosterone once weekly. 100 Each 3    nitroglycerin (NITROSTAT) 0.4 mg SL tablet Take 1 Tab by mouth every five (5) minutes as needed for Chest Pain. Sit down then put one tab under the tongue every 5 minutes as needed 1 Bottle 0    aspirin delayed-release 81 mg tablet Take 1 Tab by mouth daily.  27 Tab 0       Past History     Past Medical History:  Past Medical History:   Diagnosis Date    Anxiety and depression     anxiety, depression    Bleeding of eye, left     8/17/21 pt reports receiving injections in right eye for beeding    Chronic headaches 2007    COVID-19 12/20/2020    Diabetes type 1, uncontrolled     since 8years old    GERD (gastroesophageal reflux disease)     Hypercholesterolemia     Hypertension     Hypothyroidism     MI (myocardial infarction) ([facility] [address]     as of 8/17/21 pt reports 8 stents total    WALLY on CPAP        Past Surgical History:  Past Surgical History:   Procedure Laterality Date    HX CARPAL TUNNEL RELEASE Left 2012    HX CARPAL TUNNEL RELEASE Right 2018    CTE trigger thumb and index finger    HX HEART CATHETERIZATION      HX HEENT      HX LAP CHOLECYSTECTOMY  8/26/14    Dr Christine Duncan    HX WISDOM TEETH EXTRACTION         Family History:  Family History   Problem Relation Age of Onset    Diabetes Mother     Hypertension Mother     Heart Disease Father     Cancer Father     Diabetes Father     Other Father         MAC    Diabetes Paternal Aunt     Diabetes Maternal Grandmother     Thyroid Cancer Maternal Grandmother     Kidney Disease Maternal Grandmother     Arthritis Maternal Grandmother     Heart Disease Maternal Grandfather     High Cholesterol Maternal Grandfather     Hypertension Maternal Grandfather     Diabetes Paternal Grandmother     Hemophilia Paternal Grandfather        Social History:  Social History     Tobacco Use    Smoking status: Former     Packs/day: 0.00     Years: 14.00     Pack years: 0.00     Types: Cigarettes     Quit date: 2014     Years since quittin.8    Smokeless tobacco: Never   Vaping Use    Vaping Use: Never used   Substance Use Topics    Alcohol use: No    Drug use: No       Allergies: Allergies   Allergen Reactions    Morphine Nausea and Vomiting    Penicillin G Swelling    Percocet [Oxycodone-Acetaminophen] Hives and Nausea and Vomiting     Pt is allergic to Oxycodone, has previously tolerated Tylenol and Hydrocodone. Sulfa (Sulfonamide Antibiotics) Swelling    Tramadol Nausea Only     Nausea and headache           Review of Systems   Review of Systems   Constitutional:  Positive for diaphoresis. Negative for fever. HENT:  Negative for congestion. Eyes: Negative. Respiratory:  Positive for shortness of breath. Cardiovascular:  Positive for chest pain. Gastrointestinal:  Positive for nausea. Negative for abdominal pain. Endocrine: Negative for heat intolerance. Musculoskeletal:  Positive for back pain. Skin:  Negative for rash. Allergic/Immunologic: Negative for immunocompromised state. Neurological:  Negative for dizziness. Hematological:  Does not bruise/bleed easily. Psychiatric/Behavioral: Negative. All other systems reviewed and are negative. Physical Exam   Physical Exam  Vitals and nursing note reviewed. Constitutional:       General: He is not in acute distress. Appearance: He is well-developed. HENT:      Head: Normocephalic and atraumatic. Cardiovascular:      Rate and Rhythm: Normal rate and regular rhythm. Heart sounds: Normal heart sounds. Pulmonary:      Effort: Pulmonary effort is normal.      Breath sounds: Normal breath sounds. Abdominal:      General: Bowel sounds are normal.      Palpations: Abdomen is soft.    Musculoskeletal:         General: Normal range of motion. Cervical back: Normal range of motion. Skin:     General: Skin is warm and dry. Neurological:      General: No focal deficit present. Mental Status: He is alert and oriented to person, place, and time. Coordination: Coordination normal.   Psychiatric:         Mood and Affect: Mood normal.         Behavior: Behavior normal.       Diagnostic Study Results     Labs -     Recent Results (from the past 12 hour(s))   EKG, 12 LEAD, INITIAL    Collection Time: 11/15/22  5:28 PM   Result Value Ref Range    Ventricular Rate 83 BPM    Atrial Rate 83 BPM    P-R Interval 160 ms    QRS Duration 84 ms    Q-T Interval 358 ms    QTC Calculation (Bezet) 420 ms    Calculated P Axis 50 degrees    Calculated R Axis 3 degrees    Calculated T Axis 126 degrees    Diagnosis       Normal sinus rhythm  Left ventricular hypertrophy with repolarization abnormality  When compared with ECG of 14-SEP-2022 12:27,  No significant change was found     CBC WITH AUTOMATED DIFF    Collection Time: 11/15/22  5:34 PM   Result Value Ref Range    WBC 8.7 4.1 - 11.1 K/uL    RBC 4.80 4. 10 - 5.70 M/uL    HGB 13.2 12.1 - 17.0 g/dL    HCT 39.7 36.6 - 50.3 %    MCV 82.7 80.0 - 99.0 FL    MCH 27.5 26.0 - 34.0 PG    MCHC 33.2 30.0 - 36.5 g/dL    RDW 14.2 11.5 - 14.5 %    PLATELET 892 768 - 492 K/uL    MPV 10.1 8.9 - 12.9 FL    NRBC 0.0 0  WBC    ABSOLUTE NRBC 0.00 0.00 - 0.01 K/uL    NEUTROPHILS 61 32 - 75 %    LYMPHOCYTES 24 12 - 49 %    MONOCYTES 9 5 - 13 %    EOSINOPHILS 4 0 - 7 %    BASOPHILS 1 0 - 1 %    IMMATURE GRANULOCYTES 1 (H) 0.0 - 0.5 %    ABS. NEUTROPHILS 5.4 1.8 - 8.0 K/UL    ABS. LYMPHOCYTES 2.1 0.8 - 3.5 K/UL    ABS. MONOCYTES 0.7 0.0 - 1.0 K/UL    ABS. EOSINOPHILS 0.3 0.0 - 0.4 K/UL    ABS. BASOPHILS 0.1 0.0 - 0.1 K/UL    ABS. IMM.  GRANS. 0.1 (H) 0.00 - 0.04 K/UL    DF AUTOMATED     METABOLIC PANEL, COMPREHENSIVE    Collection Time: 11/15/22  5:34 PM   Result Value Ref Range    Sodium 132 (L) 136 - 145 mmol/L Potassium 4.4 3.5 - 5.1 mmol/L    Chloride 98 97 - 108 mmol/L    CO2 31 21 - 32 mmol/L    Anion gap 3 (L) 5 - 15 mmol/L    Glucose 297 (H) 65 - 100 mg/dL    BUN 21 (H) 6 - 20 MG/DL    Creatinine 1.10 0.70 - 1.30 MG/DL    BUN/Creatinine ratio 19 12 - 20      eGFR >60 >60 ml/min/1.73m2    Calcium 9.3 8.5 - 10.1 MG/DL    Bilirubin, total 0.5 0.2 - 1.0 MG/DL    ALT (SGPT) 48 12 - 78 U/L    AST (SGOT) 17 15 - 37 U/L    Alk. phosphatase 112 45 - 117 U/L    Protein, total 7.2 6.4 - 8.2 g/dL    Albumin 3.8 3.5 - 5.0 g/dL    Globulin 3.4 2.0 - 4.0 g/dL    A-G Ratio 1.1 1.1 - 2.2     NT-PRO BNP    Collection Time: 11/15/22  5:34 PM   Result Value Ref Range    NT pro- (H) <125 PG/ML   TROPONIN-HIGH SENSITIVITY    Collection Time: 11/15/22  5:34 PM   Result Value Ref Range    Troponin-High Sensitivity 714 (HH) 0 - 76 ng/L       Radiologic Studies -   XR CHEST PORT   Final Result      Mild interstitial pulmonary edema versus interstitial pneumonia. No   pneumothorax. Consider PA and lateral chest views when the patient can better   tolerate. CT Results  (Last 48 hours)      None          CXR Results  (Last 48 hours)                 11/15/22 1745  XR CHEST PORT Final result    Impression:      Mild interstitial pulmonary edema versus interstitial pneumonia. No   pneumothorax. Consider PA and lateral chest views when the patient can better   tolerate. Narrative:  EXAM: XR CHEST PORT       INDICATION: Chest pain. COMPARISON: CT chest on 12/15/2021. Portable chest on 9/14/2022. Chest views on   1/13/2022. TECHNIQUE: Portable chest AP view       FINDINGS: The cardiomediastinal and hilar contours are within normal limits. Coronary calcific atherosclerosis is visible. Aorta is not enlarged. Pulmonary   vasculature is within normal limits. Reticular interstitial opacities are more central than lateral and new. No focal   airspace opacity. No pneumothorax.  The visualized bones and upper abdomen are   age-appropriate. Medical Decision Making   I am the first provider for this patient. I reviewed the vital signs, available nursing notes, past medical history, past surgical history, family history and social history. Vital Signs-Reviewed the patient's vital signs. Patient Vitals for the past 12 hrs:   Temp Pulse Resp BP SpO2   11/15/22 1722 97.9 °F (36.6 °C) 84 18 (!) 146/86 100 %       EKG interpretation: (Preliminary)  Rhythm: normal sinus rhythm; and regular . Rate (approx.): 83; Axis: normal; MO interval: normal; QRS interval: normal ; ST/T wave: normal; Other findings: left ventricular hypertrophy. Records Reviewed: Nursing Notes, Old Medical Records, Previous electrocardiograms, Previous Radiology Studies, and Previous Laboratory Studies    Provider Notes (Medical Decision Making):   ACS, unstable angina,    ED Course:   Initial assessment performed. The patients presenting problems have been discussed, and they are in agreement with the care plan formulated and outlined with them. I have encouraged them to ask questions as they arise throughout their visit. Consult note:    I spoke to Dr. Brigida Barillas, cardiology. He recommends heparin drip, admission by the hospitalist    Consult note: The patient is being admitted by the hospitalist, Dr. Vicki Concepcion Time:   0    Disposition:  admit    DISCHARGE PLAN:  1. Current Discharge Medication List        2. Follow-up Information    None       3. Return to ED if worse     Diagnosis     Clinical Impression:   1. NSTEMI (non-ST elevated myocardial infarction) [facility])        Attestations:    Raf Calvo MD        Please note that this dictation was completed with [employer], the computer voice recognition software. Quite often unanticipated grammatical, syntax, homophones, and other interpretive errors are inadvertently transcribed by the computer software. Please disregard these errors.   Please excuse any errors that have escaped final proofreading. Thank you.

## 2022-11-15 NOTE — Clinical Note
Sheath #1: Sheath: inserted. Sheath inserted/placed in the right radial  artery.  Used 4/5 slender sheath dialator only, then advanced the 5/6 glide sheath in right  Radial artery

## 2022-11-15 NOTE — CONSULTS
Cardiology Consult Note    CC: CP    PCP:Beltran Jules MD  Reason for consult:  CAD  Requesting MD:  Dr. Susan Ludwig Date: 11/15/2022   Today's Date: 11/15/2022   Cardiologist:  Dr Ana Alcazar. Cardiac Assessment/Plan:   1. CAD. Unstable Angina 11/2019. Underwent PCI of LAD and LCx with OG 11/29/19. Unsuccessful attempt at PCI of RCA on that day. Then underwent staged PCI of RCA on 12/2/19. EF 61% during admission. *Cath 10/2021: PCI of Cx Marginal for in stent restenosis (Stented Cx Marginal with 80% stenosis . PCI with 2.5 xc  18 Navneet OG with 0% residual   PHIL 3 flow ). Previously Stented RCA with mild disease   Short Left Main normal; Stented LAD with mild  Disease. 2. HTN  3. Hyperlipidemia  4. Diabetes type 2  5. WALLY  6. Obesity  7. Family hx of CAD / MI (father, uncle, grandfather)  6. DJD  9. Cervical disc disease. 10. Panic attacks; sees psychiatry. 11. GERD. 12. He describes esophageal dysfxn and ulcer (last month @ [facility] [place] Presenting with intermittent CP; worse COLE and trop 700. Rx in ER w/ asa/hep gtt/NTG paste; Admission to hospitalists pending. I have recommended diagnostic cath for definitive evaluation of the patient's coronary anatomy and PCI if appropriate; All risks, benefits, and alternatives were discussed and patient wishes to proceed; tomorrow AM with KATERINA MENENDEZ.        As noted by Dr Epifania Osgood in office today: \"He was in ER in Sept, for \"I thought I was having a heart attack\"; turned out to be acid reflux. He contacted GI, and had barium study. \"She said my esophagus is shrinking. She also was worried there were a couple of ulcers. \"  EGD is planned. He says, \"I can't do anything without getting winded and feeling like I'm going to pass out. \" Gets chest pressure radiating to back. He'll drink a maalox, and sits, and this helps. He sees Dr. Ana Alcazar for CAD. \"I have 9 stents\". Last was in 2021, Dr. Shira Brito. He sees urologist for groin pain that comes and goes. Depression: He sees Ivy Justin at [facility]. At this time, this is stable. No SI. He sees Dr. Sandip Zelaya, endocrinologist, for DM. Sees Neurologist, for dizziness. GERD: \"Doing a lot better. \" He has seen gastroenterologist at [address] [place] [address] and was put on nexium qAM and pepcid bid. Pain: \"I have a bulging disk in my neck, with shooting pain from my neck up to my head. I've had three different opinions. \" He has had three MARILY--relief was fleeting. \"    ______________________________________________________________________    The patient reports continued CP ever since coronary arteries widely patent after last cath 10/2021; Central CP after eating; but also w/ exertion lately as well as COLE worse than usual for last month. No PND, orthopnea, palpitations, pre-syncope, syncope, peripheral edema; + decrease in exercise tolerance. ECG: sinus; LVH w/ repolarization changes. Tele: sinus  CXR: \"Mild interstitial pulmonary edema versus interstitial pneumonia. No  pneumothorax. Consider PA and lateral chest views when the patient can better  tolerate. \"    Notable labs: Nl CBC; Na 132; Cr 1.1; gluc 297. LDL 68. Trop 714; proBNP 208. Works for [employer] ([employer] controls). Followed by Dr. Luiz Sheffield.       ______________________________________________________________________  Patient Active Problem List    Diagnosis Date Noted    Bilateral carotid artery stenosis 06/13/2022    Corneal abrasion, right 01/19/2022    Stable proliferative diabetic retinopathy of both eyes associated with type 1 diabetes mellitus ([address] 01/19/2022    Low back pain 01/14/2022    Cervical spondylosis with radiculopathy 12/03/2021    Dizzy spells 08/23/2021    Multiple lacunar infarcts ([address] 08/23/2021    Dysthymia 03/24/2020    Adjustment disorder with mixed emotional features 03/24/2020    Anxiety disorder due to general medical condition with panic attack 03/24/2020    Insomnia disorder with non-sleep disorder mental comorbidity 03/24/2020    Type 2 diabetes with nephropathy ([facility] 01/27/2020    Coronary artery disease involving native coronary artery of native heart with unstable angina pectoris ([address] 12/03/2019    Chest pain 11/28/2019    Unstable angina ([address] 11/28/2019    Transient ischemic attack involving left internal carotid artery 07/15/2019    Severe obesity ([address] 05/15/2019    Hypertriglyceridemia 12/05/2016    Acquired hypothyroidism 11/28/2016    Essential hypertension 11/28/2016    Fibromyalgia 11/28/2016    Microalbuminuria 11/28/2016    Anxiety 11/28/2016    Migraine without aura 11/28/2016    DM (diabetes mellitus) ([address] 08/23/2014        Past Medical History:   Diagnosis Date    Anxiety and depression     anxiety, depression    Bleeding of eye, left     8/17/21 pt reports receiving injections in right eye for beeding    Chronic headaches 2007    COVID-19 12/20/2020    Diabetes type 1, uncontrolled     since 8years old    GERD (gastroesophageal reflux disease)     Hypercholesterolemia     Hypertension     Hypothyroidism     MI (myocardial infarction) ([address]     as of 8/17/21 pt reports 8 stents total    WALLY on CPAP       Past Surgical History:   Procedure Laterality Date    HX CARPAL TUNNEL RELEASE Left 2012    HX CARPAL TUNNEL RELEASE Right 2018    CTE trigger thumb and index finger    HX HEART CATHETERIZATION      HX HEENT      HX LAP CHOLECYSTECTOMY  8/26/14    Dr Aimee Renner    HX WISDOM TEETH EXTRACTION       Allergies   Allergen Reactions    Morphine Nausea and Vomiting    Penicillin G Swelling    Percocet [Oxycodone-Acetaminophen] Hives and Nausea and Vomiting     Pt is allergic to Oxycodone, has previously tolerated Tylenol and Hydrocodone.     Sulfa (Sulfonamide Antibiotics) Swelling    Tramadol Nausea Only     Nausea and headache        Family History   Problem Relation Age of Onset    Diabetes Mother     Hypertension Mother     Heart Disease Father     Cancer Father     Diabetes Father     Other Father         MAC    Diabetes Paternal Aunt     Diabetes Maternal Grandmother     Thyroid Cancer Maternal Grandmother     Kidney Disease Maternal Grandmother     Arthritis Maternal Grandmother     Heart Disease Maternal Grandfather     High Cholesterol Maternal Grandfather     Hypertension Maternal Grandfather     Diabetes Paternal Grandmother     Hemophilia Paternal Grandfather       Social History     Socioeconomic History    Marital status:      Spouse name: Not on file    Number of children: Not on file    Years of education: Not on file    Highest education level: High school graduate   Occupational History    Not on file   Tobacco Use    Smoking status: Former     Packs/day: 0.00     Years: 14.00     Pack years: 0.00     Types: Cigarettes     Quit date: 2014     Years since quittin.8    Smokeless tobacco: Never   Vaping Use    Vaping Use: Never used   Substance and Sexual Activity    Alcohol use: No    Drug use: No    Sexual activity: Not Currently   Other Topics Concern     Service No    Blood Transfusions No    Caffeine Concern No    Occupational Exposure No    Hobby Hazards No    Sleep Concern Yes    Stress Concern No    Weight Concern Yes    Special Diet Yes    Back Care Yes    Exercise No    Bike Helmet No    Seat Belt Yes    Self-Exams No   Social History Narrative    Not on file     Social Determinants of Health     Financial Resource Strain: Not on file   Food Insecurity: Not on file   Transportation Needs: Not on file   Physical Activity: Not on file   Stress: Not on file   Social Connections: Not on file   Intimate Partner Violence: Not on file   Housing Stability: Not on file     Current Facility-Administered Medications   Medication Dose Route Frequency    nitroglycerin (NITROSTAT) tablet 0.4 mg  0.4 mg SubLINGual NOW     Current Outpatient Medications   Medication Sig    metFORMIN (GLUCOPHAGE) 500 mg tablet TAKE 1 TABLET BY MOUTH TWICE DAILY WITH MEALS    insulin glargine U-300 conc (Toujeo Max U-300 SoloStar) 300 unit/mL (3 mL) inpn Take 140 units every day (new dosage)    LORazepam (ATIVAN) 1 mg tablet Take 1 Tablet by mouth every eight (8) hours as needed for Anxiety. Max Daily Amount: 3 mg.    sertraline (ZOLOFT) 100 mg tablet Take 1 Tablet by mouth daily. sertraline (ZOLOFT) 50 mg tablet Take 1 Tablet by mouth daily. alum-mag hydroxide-simeth (MYLANTA) 200-200-20 mg/5 mL susp Take 30 mL by mouth every four (4) hours as needed for Indigestion. levothyroxine (SYNTHROID) 125 mcg tablet Take 1 Tablet by mouth Daily (before breakfast). insulin regular (NOVOLIN R, HUMULIN R) 100 unit/mL injection 40 units with each meal, plus correction. Max daily dose of 150 units. busPIRone (BUSPAR) 15 mg tablet Take 1 Tablet by mouth three (3) times daily. Insulin Needles, Disposable, 32 gauge x 5/32\" ndle 5 shots per day (dispense whatever brand is covered by his insurance)    lisinopriL (PRINIVIL, ZESTRIL) 10 mg tablet Take 10 mg by mouth daily. testosterone (ANDROGEL) 20.25 mg/1.25 gram (1.62 %) gel Apply 4 pump presses daily. Ok to dispense generic testosterone. insulin U-500 syringe-needle (BD Insulin Syringe U-500) 1/2 mL 31 gauge x 15/64\" syrg Three shots per day. butalbital-acetaminophen-caffeine (FIORICET, ESGIC) -40 mg per tablet Take 1 Tablet by mouth every six (6) hours as needed for Headache. Indications: a migraine headache    esomeprazole (NEXIUM) 40 mg capsule TAKE 1 CAPSULE BY MOUTH ONCE DAILY BEFORE BREAKFAST DO NOT OPEN CAPSULE    metoprolol succinate (TOPROL-XL) 25 mg XL tablet Take 25 mg by mouth two (2) times a day. rosuvastatin (CRESTOR) 40 mg tablet Take 40 mg by mouth daily. famotidine (PEPCID) 40 mg tablet Take 1 Tablet by mouth daily. clopidogreL (PLAVIX) 75 mg tab Take 1 Tablet by mouth daily. cholecalciferol (Vitamin D3) (5000 Units/125 mcg) tab tablet Take 1 Tab by mouth daily.     Insulin Syringe-Needle U-100 (BD Insulin Syringe) 1 mL 25 x 1\" syrg Use for drawing up/injecting testosterone once weekly. nitroglycerin (NITROSTAT) 0.4 mg SL tablet Take 1 Tab by mouth every five (5) minutes as needed for Chest Pain. Sit down then put one tab under the tongue every 5 minutes as needed    aspirin delayed-release 81 mg tablet Take 1 Tab by mouth daily. No reported FHx of early CAD or SCD    Prior to Admission Medications:  Prior to Admission medications    Medication Sig Start Date End Date Taking? Authorizing Provider   metFORMIN (GLUCOPHAGE) 500 mg tablet TAKE 1 TABLET BY MOUTH TWICE DAILY WITH MEALS 11/7/22   Maeve Phillips MD   insulin glargine U-300 conc (Toujeo Max U-300 SoloStar) 300 unit/mL (3 mL) inpn Take 140 units every day (new dosage) 10/31/22   Maeve Phillips MD   LORazepam (ATIVAN) 1 mg tablet Take 1 Tablet by mouth every eight (8) hours as needed for Anxiety. Max Daily Amount: 3 mg. 10/13/22   Maria Esther Fernandez NP   sertraline (ZOLOFT) 100 mg tablet Take 1 Tablet by mouth daily. 10/13/22   Mena Fernandez NP   sertraline (ZOLOFT) 50 mg tablet Take 1 Tablet by mouth daily. 10/13/22   Mena Fernandez NP   alum-mag hydroxide-simeth (MYLANTA) 200-200-20 mg/5 mL susp Take 30 mL by mouth every four (4) hours as needed for Indigestion. 9/14/22   Devi Candelario MD   levothyroxine (SYNTHROID) 125 mcg tablet Take 1 Tablet by mouth Daily (before breakfast). 9/13/22   Maeve Phillips MD   insulin regular (NOVOLIN R, HUMULIN R) 100 unit/mL injection 40 units with each meal, plus correction. Max daily dose of 150 units. 8/18/22   Maeve Phillips MD   busPIRone (BUSPAR) 15 mg tablet Take 1 Tablet by mouth three (3) times daily. 8/12/22   Ranjith Heck MD   Insulin Needles, Disposable, 32 gauge x 5/32\" ndle 5 shots per day (dispense whatever brand is covered by his insurance) 8/2/22   Maeve Phillips MD   lisinopriL (PRINIVIL, ZESTRIL) 10 mg tablet Take 10 mg by mouth daily.  6/24/22 Provider, Historical   testosterone (ANDROGEL) 20.25 mg/1.25 gram (1.62 %) gel Apply 4 pump presses daily. Ok to dispense generic testosterone. 6/29/22   Leisa Ovalle MD   insulin U-500 syringe-needle (BD Insulin Syringe U-500) 1/2 mL 31 gauge x 15/64\" syrg Three shots per day. 6/29/22   Leisa Ovalle MD   butalbital-acetaminophen-caffeine (FIORICET, ESGIC) -40 mg per tablet Take 1 Tablet by mouth every six (6) hours as needed for Headache. Indications: a migraine headache 6/13/22   Maryuri Ochoa NP   esomeprazole (NEXIUM) 40 mg capsule TAKE 1 CAPSULE BY MOUTH ONCE DAILY BEFORE BREAKFAST DO NOT OPEN CAPSULE 4/27/22   Provider, Historical   metoprolol succinate (TOPROL-XL) 25 mg XL tablet Take 25 mg by mouth two (2) times a day. Provider, Historical   rosuvastatin (CRESTOR) 40 mg tablet Take 40 mg by mouth daily. 2/16/22   Provider, Historical   famotidine (PEPCID) 40 mg tablet Take 1 Tablet by mouth daily. 1/19/22   Priscilla Hurst MD   clopidogreL (PLAVIX) 75 mg tab Take 1 Tablet by mouth daily. 10/29/21   Ilene Jenkins MD   cholecalciferol (Vitamin D3) (5000 Units/125 mcg) tab tablet Take 1 Tab by mouth daily. 3/10/21   Priscilla Hurst MD   Insulin Syringe-Needle U-100 (BD Insulin Syringe) 1 mL 25 x 1\" syrg Use for drawing up/injecting testosterone once weekly. 12/7/20   Leisa Ovalle MD   nitroglycerin (NITROSTAT) 0.4 mg SL tablet Take 1 Tab by mouth every five (5) minutes as needed for Chest Pain. Sit down then put one tab under the tongue every 5 minutes as needed 12/4/19   Alexus Pichardo MD   aspirin delayed-release 81 mg tablet Take 1 Tab by mouth daily.  9/24/19   Regine Verduzco MD        Review of Symptoms: Except as noted in HPI, patient denies recent fever or chills, nausea, vomiting, diarrhea, hemoptysis, hematemesis, dysuria, myalgias, focal neurologic symptoms, ecchymosis, angioedema, odynophagia, dysphagia, sore throat, earache,rash, melena, hematochezia, depression, GERD, cold intolerance, petechia, bleeding gums, or significant weight loss. 24 hr VS reviewed, overall VSSAF  Temp (24hrs), Av °F (36.7 °C), Min:97.9 °F (36.6 °C), Max:98.1 °F (36.7 °C)    Patient Vitals for the past 8 hrs:   Pulse   11/15/22 172 84     Patient Vitals for the past 8 hrs:   Resp   11/15/22 1722 18     Patient Vitals for the past 8 hrs:   BP   11/15/22 1722 (!) 146/86     No intake or output data in the 24 hours ending 11/15/22 185      Physical Exam (complete single organ system exam)    Cons: The patient is no distress. Appears stated age. HEENT: Normal conjunctivae and palate. No xanthelasma. Neck: Flat JVP without appreciable HJR. Resp: Normal respiratory effort with clear lungs bilaterally. CV: Regular rate and rhythm. PMI not palpated. Normal S1,S2  No gallop or rubs appreciated. Apical systolic murmur appreciated. Intact carotid upstroke bilaterally without appreciated bruits. Abdominal aorta not palpated; no abdominal bruit noted. Intact pedal pulses. No peripheral edema. GI: No abd mass noted, soft; no organomegaly noted. Bowel sounds present. Obese  Muscular:  No significant kyphosis. Strength WNL for age. Ext: No cyanosis, clubbing, or stigmata of peripheral embolization. Derm: No ulcers or stasis dermatitis of lower extremities. Neuro: Alert and oriented x 3;  Grossly non-focal. Normal mood and affect.      Labs:   Recent Results (from the past 24 hour(s))   EKG, 12 LEAD, INITIAL    Collection Time: 11/15/22  5:28 PM   Result Value Ref Range    Ventricular Rate 83 BPM    Atrial Rate 83 BPM    P-R Interval 160 ms    QRS Duration 84 ms    Q-T Interval 358 ms    QTC Calculation (Bezet) 420 ms    Calculated P Axis 50 degrees    Calculated R Axis 3 degrees    Calculated T Axis 126 degrees    Diagnosis       Normal sinus rhythm  Left ventricular hypertrophy with repolarization abnormality  When compared with ECG of 14-SEP-2022 12:27,  No significant change was found     CBC WITH AUTOMATED DIFF    Collection Time: 11/15/22  5:34 PM   Result Value Ref Range    WBC 8.7 4.1 - 11.1 K/uL    RBC 4.80 4. 10 - 5.70 M/uL    HGB 13.2 12.1 - 17.0 g/dL    HCT 39.7 36.6 - 50.3 %    MCV 82.7 80.0 - 99.0 FL    MCH 27.5 26.0 - 34.0 PG    MCHC 33.2 30.0 - 36.5 g/dL    RDW 14.2 11.5 - 14.5 %    PLATELET 007 968 - 710 K/uL    MPV 10.1 8.9 - 12.9 FL    NRBC 0.0 0  WBC    ABSOLUTE NRBC 0.00 0.00 - 0.01 K/uL    NEUTROPHILS 61 32 - 75 %    LYMPHOCYTES 24 12 - 49 %    MONOCYTES 9 5 - 13 %    EOSINOPHILS 4 0 - 7 %    BASOPHILS 1 0 - 1 %    IMMATURE GRANULOCYTES 1 (H) 0.0 - 0.5 %    ABS. NEUTROPHILS 5.4 1.8 - 8.0 K/UL    ABS. LYMPHOCYTES 2.1 0.8 - 3.5 K/UL    ABS. MONOCYTES 0.7 0.0 - 1.0 K/UL    ABS. EOSINOPHILS 0.3 0.0 - 0.4 K/UL    ABS. BASOPHILS 0.1 0.0 - 0.1 K/UL    ABS. IMM. GRANS. 0.1 (H) 0.00 - 0.04 K/UL    DF AUTOMATED     METABOLIC PANEL, COMPREHENSIVE    Collection Time: 11/15/22  5:34 PM   Result Value Ref Range    Sodium 132 (L) 136 - 145 mmol/L    Potassium 4.4 3.5 - 5.1 mmol/L    Chloride 98 97 - 108 mmol/L    CO2 31 21 - 32 mmol/L    Anion gap 3 (L) 5 - 15 mmol/L    Glucose 297 (H) 65 - 100 mg/dL    BUN 21 (H) 6 - 20 MG/DL    Creatinine 1.10 0.70 - 1.30 MG/DL    BUN/Creatinine ratio 19 12 - 20      eGFR >60 >60 ml/min/1.73m2    Calcium 9.3 8.5 - 10.1 MG/DL    Bilirubin, total 0.5 0.2 - 1.0 MG/DL    ALT (SGPT) 48 12 - 78 U/L    AST (SGOT) 17 15 - 37 U/L    Alk. phosphatase 112 45 - 117 U/L    Protein, total 7.2 6.4 - 8.2 g/dL    Albumin 3.8 3.5 - 5.0 g/dL    Globulin 3.4 2.0 - 4.0 g/dL    A-G Ratio 1.1 1.1 - 2.2     NT-PRO BNP    Collection Time: 11/15/22  5:34 PM   Result Value Ref Range    NT pro- (H) <125 PG/ML   TROPONIN-HIGH SENSITIVITY    Collection Time: 11/15/22  5:34 PM   Result Value Ref Range    Troponin-High Sensitivity 714 (HH) 0 - 76 ng/L     No results for input(s): CPK, CKMB, CKNDX, TROIQ in the last 72 hours.     Recent Labs     11/15/22  0455 *   K 4.4   CL 98   CO2 31   BUN 21*   CREA 1.10   *   CA 9.3   ALB 3.8   WBC 8.7   HGB 13.2   HCT 39.7          Mo Obrien MD

## 2022-11-15 NOTE — PROGRESS NOTES
PROGRESS NOTE  Name: Stefan Rushing   : 1980       ASSESSMENT/ PLAN:     Chest pain and dyspnea: Concerning for angina. F/up ASAP with Dr. Danelle White. ER when it recurs, to get Trop and Ekg checked. Hypertension: BP is fine. Type 2 diabetes with nephropathy ([facility] [place] [address]): Follows with Dr. Wendi Pang labs to him. He is seeing ophthalmologist for retinopathy. Coronary artery disease involving native coronary artery of native heart with unstable angina pectoris [facility]): As per Dr. Danelle White, cardiology. Severe obesity ([facility] [place] [address]): Diet, exercise. Hypertriglyceridemia:  Adjustment disorder with mixed emotional features / Anxiety disorder due to general medical condition with panic attack: Continue follow up with Psychiatry. Insomnia disorder with non-sleep disorder mental comorbidity: Continue follow up with psychiatrist.   Multiple lacunar infarcts [facility]): Per neurology. Dizzy spells: Seeing neurology. Cervical spondylosis with radiculopathy: Ongoing, not relieved with MARILY. Future appt planned with Dr. Shay Nath, pain management. GERD: Improved; seeing GI at [facility]. RTC 6 months DM    I have reviewed the patient's medications and risks/side effects/benefits were discussed. Diagnosis(-es) explained to patient and questions answered. Literature provided where appropriate. SUBJECTIVE:   Mr. Stefan Rushing is a 36 y.o. male who is here for follow up of routine medical issues. Chief Complaint   Patient presents with    Follow-up     6 month fu       He was in ER in Sept, for \"I thought I was having a heart attack\"; turned out to be acid reflux. He contacted GI, and had barium study. \"She said my esophagus is shrinking. She also was worried there were a couple of ulcers. \"  EGD is planned. He says, \"I can't do anything without getting winded and feeling like I'm going to pass out. \" Gets chest pressure radiating to back. He'll drink a maalox, and sits, and this helps.      He sees  Rimma Villeda for CAD. \"I have 9 stents\". Last was in 2021, Dr. Darvin Lizarraga. He sees urologist for groin pain that comes and goes. Depression: He sees Princess Mckeon at [facility]. At this time, this is stable. No SI. He sees Dr. Liza Read, endocrinologist, for DM. Sees Neurologist, for dizziness. GERD: \"Doing a lot better. \" He has seen gastroenterologist at [facility] and was put on nexium qAM and pepcid bid. Pain: \"I have a bulging disk in my neck, with shooting pain from my neck up to my head. I've had three different opinions. \" He has had three MARILY--relief was fleeting. At this time, he is otherwise doing well and has brought no other complaints to my attention today. For a list of the medical issues addressed today, see the assessment and plan below. PMH:   Past Medical History:   Diagnosis Date    Anxiety and depression     anxiety, depression    Bleeding of eye, left     8/17/21 pt reports receiving injections in right eye for beeding    Chronic headaches 2007    COVID-19 12/20/2020    Diabetes type 1, uncontrolled     since 8years old    GERD (gastroesophageal reflux disease)     Hypercholesterolemia     Hypertension     Hypothyroidism     MI (myocardial infarction) ([facility] [address]     as of 8/17/21 pt reports 8 stents total    WALLY on CPAP        Past Surgical History:   Procedure Laterality Date    HX CARPAL TUNNEL RELEASE Left 2012    HX CARPAL TUNNEL RELEASE Right 2018    CTE trigger thumb and index finger    HX HEART CATHETERIZATION      HX HEENT      HX LAP CHOLECYSTECTOMY  8/26/14    Dr Aimee Renner    HX WISDOM TEETH EXTRACTION         All: He is allergic to morphine, penicillin g, percocet [oxycodone-acetaminophen], sulfa (sulfonamide antibiotics), and tramadol.    Current Outpatient Medications   Medication Sig    metFORMIN (GLUCOPHAGE) 500 mg tablet TAKE 1 TABLET BY MOUTH TWICE DAILY WITH MEALS    insulin glargine U-300 conc (Toujeo Max U-300 SoloStar) 300 unit/mL (3 mL) inpn Take 140 units every day (new dosage)    LORazepam (ATIVAN) 1 mg tablet Take 1 Tablet by mouth every eight (8) hours as needed for Anxiety. Max Daily Amount: 3 mg.    sertraline (ZOLOFT) 100 mg tablet Take 1 Tablet by mouth daily. sertraline (ZOLOFT) 50 mg tablet Take 1 Tablet by mouth daily. alum-mag hydroxide-simeth (MYLANTA) 200-200-20 mg/5 mL susp Take 30 mL by mouth every four (4) hours as needed for Indigestion. levothyroxine (SYNTHROID) 125 mcg tablet Take 1 Tablet by mouth Daily (before breakfast). insulin regular (NOVOLIN R, HUMULIN R) 100 unit/mL injection 40 units with each meal, plus correction. Max daily dose of 150 units. busPIRone (BUSPAR) 15 mg tablet Take 1 Tablet by mouth three (3) times daily. Insulin Needles, Disposable, 32 gauge x 5/32\" ndle 5 shots per day (dispense whatever brand is covered by his insurance)    lisinopriL (PRINIVIL, ZESTRIL) 10 mg tablet Take 10 mg by mouth daily. testosterone (ANDROGEL) 20.25 mg/1.25 gram (1.62 %) gel Apply 4 pump presses daily. Ok to dispense generic testosterone. insulin U-500 syringe-needle (BD Insulin Syringe U-500) 1/2 mL 31 gauge x 15/64\" syrg Three shots per day. butalbital-acetaminophen-caffeine (FIORICET, ESGIC) -40 mg per tablet Take 1 Tablet by mouth every six (6) hours as needed for Headache. Indications: a migraine headache    esomeprazole (NEXIUM) 40 mg capsule TAKE 1 CAPSULE BY MOUTH ONCE DAILY BEFORE BREAKFAST DO NOT OPEN CAPSULE    metoprolol succinate (TOPROL-XL) 25 mg XL tablet Take 25 mg by mouth two (2) times a day. rosuvastatin (CRESTOR) 40 mg tablet Take 40 mg by mouth daily. famotidine (PEPCID) 40 mg tablet Take 1 Tablet by mouth daily. clopidogreL (PLAVIX) 75 mg tab Take 1 Tablet by mouth daily. cholecalciferol (Vitamin D3) (5000 Units/125 mcg) tab tablet Take 1 Tab by mouth daily.     Insulin Syringe-Needle U-100 (BD Insulin Syringe) 1 mL 25 x 1\" syrg Use for drawing up/injecting testosterone once weekly. nitroglycerin (NITROSTAT) 0.4 mg SL tablet Take 1 Tab by mouth every five (5) minutes as needed for Chest Pain. Sit down then put one tab under the tongue every 5 minutes as needed    aspirin delayed-release 81 mg tablet Take 1 Tab by mouth daily. lidocaine (LMX 4) 4 % topical cream Apply  to affected area two (2) times daily as needed for Pain. (Patient not taking: Reported on 11/15/2022)     No current facility-administered medications for this visit. FH: His family history includes Arthritis in his maternal grandmother; Cancer in his father; Diabetes in his father, maternal grandmother, mother, paternal aunt, and paternal grandmother; Heart Disease in his father and maternal grandfather; Hemophilia in his paternal grandfather; High Cholesterol in his maternal grandfather; Hypertension in his maternal grandfather and mother; Kidney Disease in his maternal grandmother; Other in his father; Thyroid Cancer in his maternal grandmother. SH: He is a former , worked for [employer]; Trying to get disability. He reports that he quit smoking about 8 years ago. His smoking use included cigarettes. He has never used smokeless tobacco. He reports that he does not drink alcohol and does not use drugs. ROS: See above; Complete ROS otherwise negative. OBJECTIVE:   Vitals:   Visit Vitals  BP 98/63 (BP 1 Location: Left upper arm, BP Patient Position: Sitting, BP Cuff Size: Large adult)   Pulse 83   Temp 98.1 °F (36.7 °C) (Temporal)   Resp 18   Ht 5' 9\" (1.753 m)   Wt 257 lb (116.6 kg)   SpO2 97%   BMI 37.95 kg/m²      Gen: Pleasant 36 y.o.  male in NAD. HEENT: PERRLA. EOMI. OP moist and pink. Neck: Supple. No LAD. HEART: RRR, No M/G/R.    LUNGS: CTAB No W/R. ABDOMEN: S, NT, ND, BS+. EXTREMITIES: Warm. No C/C/E.  MUSCULOSKELETAL: Normal ROM, muscle strength 5/5 all groups. NEURO: Alert and oriented x 3. Cranial nerves grossly intact.   No focal sensory or motor deficits noted. SKIN: Warm. Dry. No rashes or other lesions noted. Lab Results   Component Value Date/Time    Sodium 134 (L) 09/14/2022 09:54 AM    Potassium 5.1 09/14/2022 09:54 AM    Chloride 100 09/14/2022 09:54 AM    CO2 28 09/14/2022 09:54 AM    Anion gap 6 09/14/2022 09:54 AM    Glucose 383 (H) 09/14/2022 09:54 AM    BUN 20 09/14/2022 09:54 AM    Creatinine 1.15 09/14/2022 09:54 AM    BUN/Creatinine ratio 17 09/14/2022 09:54 AM    GFR est AA >60 09/14/2022 09:54 AM    GFR est non-AA >60 09/14/2022 09:54 AM    Calcium 9.1 09/14/2022 09:54 AM    Bilirubin, total 0.5 09/14/2022 09:54 AM    ALT (SGPT) 58 09/14/2022 09:54 AM    Alk.  phosphatase 105 09/14/2022 09:54 AM    Protein, total 6.9 09/14/2022 09:54 AM    Albumin 3.4 (L) 09/14/2022 09:54 AM    Globulin 3.5 09/14/2022 09:54 AM    A-G Ratio 1.0 (L) 09/14/2022 09:54 AM       Lab Results   Component Value Date/Time    Cholesterol, total 181 09/07/2022 09:40 AM    HDL Cholesterol 28 (L) 09/07/2022 09:40 AM    LDL,Direct 151 (H) 08/24/2014 02:16 AM    LDL, calculated 68 09/07/2022 09:40 AM    LDL, calculated 62.4 10/07/2021 01:50 PM    Triglyceride 552 (HH) 09/07/2022 09:40 AM    CHOL/HDL Ratio 4.1 10/07/2021 01:50 PM        Lab Results   Component Value Date/Time    Hemoglobin A1c 9.9 (H) 10/07/2021 01:50 PM    Hemoglobin A1c, External 10.1 04/26/2022 12:00 AM       Lab Results   Component Value Date/Time    WBC 8.7 09/14/2022 09:54 AM    HGB 12.6 09/14/2022 09:54 AM    HCT 38.5 09/14/2022 09:54 AM    PLATELET 980 37/47/3849 09:54 AM    MCV 82.4 09/14/2022 09:54 AM

## 2022-11-15 NOTE — Clinical Note
TRANSFER - OUT REPORT:     Verbal report given to: [facility]. Report consisted of patient's Situation, Background, Assessment and   Recommendations(SBAR). Opportunity for questions and clarification was provided. Patient transported with a Registered Nurse. [facility]

## 2022-11-15 NOTE — Clinical Note
TRANSFER - OUT REPORT:     Verbal report given to: [facility]. Report consisted of patient's Situation, Background, Assessment and   Recommendations(SBAR). Opportunity for questions and clarification was provided. Patient transported with a Registered Nurse. Patient transported to: [facility].

## 2022-11-15 NOTE — PROGRESS NOTES
1. \"Have you been to the ER, urgent care clinic since your last visit? Hospitalized since your last visit? Yes, 9/14/2022 Chest pain    2. \"Have you seen or consulted any other health care providers outside of the [address] [place] [address] since your last visit? See care team     3. For patients aged 39-70: Has the patient had a colonoscopy / FIT/ Cologuard?  No

## 2022-11-16 ENCOUNTER — APPOINTMENT (OUTPATIENT)
Dept: NON INVASIVE DIAGNOSTICS | Age: 42
End: 2022-11-16
Attending: STUDENT IN AN ORGANIZED HEALTH CARE EDUCATION/TRAINING PROGRAM
Payer: COMMERCIAL

## 2022-11-16 ENCOUNTER — APPOINTMENT (OUTPATIENT)
Dept: VASCULAR SURGERY | Age: 42
End: 2022-11-16
Payer: COMMERCIAL

## 2022-11-16 ENCOUNTER — APPOINTMENT (OUTPATIENT)
Dept: CT IMAGING | Age: 42
End: 2022-11-16
Payer: COMMERCIAL

## 2022-11-16 LAB
ANION GAP SERPL CALC-SCNC: 8 MMOL/L (ref 5–15)
APPEARANCE UR: CLEAR
ARTERIAL PATENCY WRIST A: ABNORMAL
ATRIAL RATE: 83 BPM
BACTERIA URNS QL MICRO: NEGATIVE /HPF
BASE EXCESS BLDA CALC-SCNC: 0.4 MMOL/L
BDY SITE: ABNORMAL
BILIRUB UR QL: NEGATIVE
BUN SERPL-MCNC: 20 MG/DL (ref 6–20)
BUN/CREAT SERPL: 20 (ref 12–20)
CALCIUM SERPL-MCNC: 9 MG/DL (ref 8.5–10.1)
CALCULATED P AXIS, ECG09: 50 DEGREES
CALCULATED R AXIS, ECG10: 3 DEGREES
CALCULATED T AXIS, ECG11: 126 DEGREES
CHLORIDE SERPL-SCNC: 102 MMOL/L (ref 97–108)
CHOLEST SERPL-MCNC: 148 MG/DL
CO2 SERPL-SCNC: 26 MMOL/L (ref 21–32)
COLOR UR: ABNORMAL
COMMENT, HOLDF: NORMAL
CREAT SERPL-MCNC: 1 MG/DL (ref 0.7–1.3)
DIAGNOSIS, 93000: NORMAL
ECHO AV AREA PEAK VELOCITY: 1 CM2
ECHO AV AREA VTI: 1.2 CM2
ECHO AV AREA/BSA PEAK VELOCITY: 0.4 CM2/M2
ECHO AV AREA/BSA VTI: 0.5 CM2/M2
ECHO AV MEAN GRADIENT: 37 MMHG
ECHO AV MEAN VELOCITY: 2.9 M/S
ECHO AV PEAK GRADIENT: 63 MMHG
ECHO AV PEAK VELOCITY: 4 M/S
ECHO AV VELOCITY RATIO: 0.28
ECHO AV VTI: 85.9 CM
ECHO LA DIAMETER INDEX: 1.52 CM/M2
ECHO LA DIAMETER: 3.5 CM
ECHO LA VOL 4C: 35 ML (ref 18–58)
ECHO LA VOLUME INDEX A4C: 15 ML/M2 (ref 16–34)
ECHO LV E' LATERAL VELOCITY: 9 CM/S
ECHO LV E' SEPTAL VELOCITY: 5 CM/S
ECHO LV EDV A4C: 100 ML
ECHO LV EDV INDEX A4C: 43 ML/M2
ECHO LV EJECTION FRACTION A4C: 55 %
ECHO LV ESV A4C: 45 ML
ECHO LV ESV INDEX A4C: 20 ML/M2
ECHO LV FRACTIONAL SHORTENING: 21 % (ref 28–44)
ECHO LV INTERNAL DIMENSION DIASTOLE INDEX: 2.09 CM/M2
ECHO LV INTERNAL DIMENSION DIASTOLIC: 4.8 CM (ref 4.2–5.9)
ECHO LV INTERNAL DIMENSION SYSTOLIC INDEX: 1.65 CM/M2
ECHO LV INTERNAL DIMENSION SYSTOLIC: 3.8 CM
ECHO LV IVSD: 1.1 CM (ref 0.6–1)
ECHO LV MASS 2D: 219.1 G (ref 88–224)
ECHO LV MASS INDEX 2D: 95.3 G/M2 (ref 49–115)
ECHO LV POSTERIOR WALL DIASTOLIC: 1.3 CM (ref 0.6–1)
ECHO LV RELATIVE WALL THICKNESS RATIO: 0.54
ECHO LVOT AREA: 3.8 CM2
ECHO LVOT AV VTI INDEX: 0.33
ECHO LVOT DIAM: 2.2 CM
ECHO LVOT MEAN GRADIENT: 3 MMHG
ECHO LVOT PEAK GRADIENT: 5 MMHG
ECHO LVOT PEAK VELOCITY: 1.1 M/S
ECHO LVOT STROKE VOLUME INDEX: 46.4 ML/M2
ECHO LVOT SV: 106.8 ML
ECHO LVOT VTI: 28.1 CM
ECHO MV A VELOCITY: 0.98 M/S
ECHO MV AREA PHT: 4.6 CM2
ECHO MV AREA VTI: 3.3 CM2
ECHO MV E DECELERATION TIME (DT): 164.3 MS
ECHO MV E VELOCITY: 0.95 M/S
ECHO MV E/A RATIO: 0.97
ECHO MV E/E' LATERAL: 10.56
ECHO MV E/E' RATIO (AVERAGED): 14.78
ECHO MV E/E' SEPTAL: 19
ECHO MV LVOT VTI INDEX: 1.15
ECHO MV MAX VELOCITY: 1 M/S
ECHO MV MEAN GRADIENT: 2 MMHG
ECHO MV MEAN VELOCITY: 0.6 M/S
ECHO MV PEAK GRADIENT: 4 MMHG
ECHO MV PRESSURE HALF TIME (PHT): 47.8 MS
ECHO MV VTI: 32.3 CM
ECHO PV MAX VELOCITY: 1.4 M/S
ECHO PV PEAK GRADIENT: 8 MMHG
ECHO RV FREE WALL PEAK S': 13 CM/S
ECHO RV TAPSE: 1.3 CM (ref 1.7–?)
EPITH CASTS URNS QL MICRO: ABNORMAL /LPF
ERYTHROCYTE [DISTWIDTH] IN BLOOD BY AUTOMATED COUNT: 14.3 % (ref 11.5–14.5)
EST. AVERAGE GLUCOSE BLD GHB EST-MCNC: 212 MG/DL
GLUCOSE BLD STRIP.AUTO-MCNC: 181 MG/DL (ref 65–117)
GLUCOSE BLD STRIP.AUTO-MCNC: 190 MG/DL (ref 65–117)
GLUCOSE BLD STRIP.AUTO-MCNC: 227 MG/DL (ref 65–117)
GLUCOSE BLD STRIP.AUTO-MCNC: 269 MG/DL (ref 65–117)
GLUCOSE BLD STRIP.AUTO-MCNC: 298 MG/DL (ref 65–117)
GLUCOSE BLD STRIP.AUTO-MCNC: 370 MG/DL (ref 65–117)
GLUCOSE SERPL-MCNC: 207 MG/DL (ref 65–100)
GLUCOSE UR STRIP.AUTO-MCNC: >1000 MG/DL
HBA1C MFR BLD: 9 % (ref 4–5.6)
HCO3 BLDA-SCNC: 26 MMOL/L (ref 22–26)
HCT VFR BLD AUTO: 37.6 % (ref 36.6–50.3)
HDLC SERPL-MCNC: 33 MG/DL
HDLC SERPL: 4.5 {RATIO} (ref 0–5)
HGB BLD-MCNC: 12.4 G/DL (ref 12.1–17)
HGB UR QL STRIP: NEGATIVE
HYALINE CASTS URNS QL MICRO: ABNORMAL /LPF (ref 0–2)
KETONES UR QL STRIP.AUTO: NEGATIVE MG/DL
LDLC SERPL CALC-MCNC: 45.8 MG/DL (ref 0–100)
LEUKOCYTE ESTERASE UR QL STRIP.AUTO: NEGATIVE
MAGNESIUM SERPL-MCNC: 2 MG/DL (ref 1.6–2.4)
MCH RBC QN AUTO: 27.4 PG (ref 26–34)
MCHC RBC AUTO-ENTMCNC: 33 G/DL (ref 30–36.5)
MCV RBC AUTO: 83.2 FL (ref 80–99)
NITRITE UR QL STRIP.AUTO: NEGATIVE
NRBC # BLD: 0 K/UL (ref 0–0.01)
NRBC BLD-RTO: 0 PER 100 WBC
P-R INTERVAL, ECG05: 160 MS
PCO2 BLDA: 44 MMHG (ref 35–45)
PH BLDA: 7.39 [PH] (ref 7.35–7.45)
PH UR STRIP: 6 [PH] (ref 5–8)
PLATELET # BLD AUTO: 164 K/UL (ref 150–400)
PMV BLD AUTO: 9.5 FL (ref 8.9–12.9)
PO2 BLDA: 70 MMHG (ref 80–100)
POTASSIUM SERPL-SCNC: 4.1 MMOL/L (ref 3.5–5.1)
PROCALCITONIN SERPL-MCNC: <0.05 NG/ML
PROT UR STRIP-MCNC: NEGATIVE MG/DL
Q-T INTERVAL, ECG07: 358 MS
QRS DURATION, ECG06: 84 MS
QTC CALCULATION (BEZET), ECG08: 420 MS
RBC # BLD AUTO: 4.52 M/UL (ref 4.1–5.7)
RBC #/AREA URNS HPF: ABNORMAL /HPF (ref 0–5)
SAMPLES BEING HELD,HOLD: NORMAL
SAO2 % BLD: 94 % (ref 92–97)
SAO2% DEVICE SAO2% SENSOR NAME: ABNORMAL
SERVICE CMNT-IMP: ABNORMAL
SODIUM SERPL-SCNC: 136 MMOL/L (ref 136–145)
SP GR UR REFRACTOMETRY: 1.01 (ref 1–1.03)
SPECIMEN SITE: ABNORMAL
TRIGL SERPL-MCNC: 346 MG/DL (ref ?–150)
TROPONIN-HIGH SENSITIVITY: 639 NG/L (ref 0–76)
TSH SERPL DL<=0.05 MIU/L-ACNC: 1.96 UIU/ML (ref 0.36–3.74)
UA: UC IF INDICATED,UAUC: ABNORMAL
UFH PPP CHRO-ACNC: <0.1 IU/ML
UROBILINOGEN UR QL STRIP.AUTO: 0.2 EU/DL (ref 0.2–1)
VENTRICULAR RATE, ECG03: 83 BPM
VLDLC SERPL CALC-MCNC: 69.2 MG/DL
WBC # BLD AUTO: 8.6 K/UL (ref 4.1–11.1)
WBC URNS QL MICRO: ABNORMAL /HPF (ref 0–4)

## 2022-11-16 PROCEDURE — C1887 CATHETER, GUIDING: HCPCS | Performed by: STUDENT IN AN ORGANIZED HEALTH CARE EDUCATION/TRAINING PROGRAM

## 2022-11-16 PROCEDURE — 93880 EXTRACRANIAL BILAT STUDY: CPT

## 2022-11-16 PROCEDURE — C1894 INTRO/SHEATH, NON-LASER: HCPCS | Performed by: STUDENT IN AN ORGANIZED HEALTH CARE EDUCATION/TRAINING PROGRAM

## 2022-11-16 PROCEDURE — 2709999900 HC NON-CHARGEABLE SUPPLY: Performed by: STUDENT IN AN ORGANIZED HEALTH CARE EDUCATION/TRAINING PROGRAM

## 2022-11-16 PROCEDURE — 74011000636 HC RX REV CODE- 636: Performed by: INTERNAL MEDICINE

## 2022-11-16 PROCEDURE — 83036 HEMOGLOBIN GLYCOSYLATED A1C: CPT

## 2022-11-16 PROCEDURE — 74011250636 HC RX REV CODE- 250/636: Performed by: INTERNAL MEDICINE

## 2022-11-16 PROCEDURE — 80061 LIPID PANEL: CPT

## 2022-11-16 PROCEDURE — 77030015766: Performed by: STUDENT IN AN ORGANIZED HEALTH CARE EDUCATION/TRAINING PROGRAM

## 2022-11-16 PROCEDURE — 99152 MOD SED SAME PHYS/QHP 5/>YRS: CPT | Performed by: STUDENT IN AN ORGANIZED HEALTH CARE EDUCATION/TRAINING PROGRAM

## 2022-11-16 PROCEDURE — 84443 ASSAY THYROID STIM HORMONE: CPT

## 2022-11-16 PROCEDURE — 65270000029 HC RM PRIVATE

## 2022-11-16 PROCEDURE — 74011000250 HC RX REV CODE- 250: Performed by: INTERNAL MEDICINE

## 2022-11-16 PROCEDURE — C1769 GUIDE WIRE: HCPCS | Performed by: STUDENT IN AN ORGANIZED HEALTH CARE EDUCATION/TRAINING PROGRAM

## 2022-11-16 PROCEDURE — 84145 PROCALCITONIN (PCT): CPT

## 2022-11-16 PROCEDURE — 94729 DIFFUSING CAPACITY: CPT

## 2022-11-16 PROCEDURE — 77030040934 HC CATH DIAG DXTERITY MEDT -A: Performed by: STUDENT IN AN ORGANIZED HEALTH CARE EDUCATION/TRAINING PROGRAM

## 2022-11-16 PROCEDURE — B2111ZZ FLUOROSCOPY OF MULTIPLE CORONARY ARTERIES USING LOW OSMOLAR CONTRAST: ICD-10-PCS | Performed by: STUDENT IN AN ORGANIZED HEALTH CARE EDUCATION/TRAINING PROGRAM

## 2022-11-16 PROCEDURE — 77030019698 HC SYR ANGI MDLON MRTM -A: Performed by: STUDENT IN AN ORGANIZED HEALTH CARE EDUCATION/TRAINING PROGRAM

## 2022-11-16 PROCEDURE — 77030008543 HC TBNG MON PRSS MRTM -A: Performed by: STUDENT IN AN ORGANIZED HEALTH CARE EDUCATION/TRAINING PROGRAM

## 2022-11-16 PROCEDURE — 93458 L HRT ARTERY/VENTRICLE ANGIO: CPT | Performed by: STUDENT IN AN ORGANIZED HEALTH CARE EDUCATION/TRAINING PROGRAM

## 2022-11-16 PROCEDURE — 94375 RESPIRATORY FLOW VOLUME LOOP: CPT

## 2022-11-16 PROCEDURE — 74011000250 HC RX REV CODE- 250: Performed by: STUDENT IN AN ORGANIZED HEALTH CARE EDUCATION/TRAINING PROGRAM

## 2022-11-16 PROCEDURE — 82803 BLOOD GASES ANY COMBINATION: CPT

## 2022-11-16 PROCEDURE — 74011250637 HC RX REV CODE- 250/637: Performed by: NURSE PRACTITIONER

## 2022-11-16 PROCEDURE — 36415 COLL VENOUS BLD VENIPUNCTURE: CPT

## 2022-11-16 PROCEDURE — 74011000636 HC RX REV CODE- 636: Performed by: STUDENT IN AN ORGANIZED HEALTH CARE EDUCATION/TRAINING PROGRAM

## 2022-11-16 PROCEDURE — 99153 MOD SED SAME PHYS/QHP EA: CPT | Performed by: STUDENT IN AN ORGANIZED HEALTH CARE EDUCATION/TRAINING PROGRAM

## 2022-11-16 PROCEDURE — 80048 BASIC METABOLIC PNL TOTAL CA: CPT

## 2022-11-16 PROCEDURE — 77030030195 HC CATH ANGI DX PRF4 MRTM -A: Performed by: STUDENT IN AN ORGANIZED HEALTH CARE EDUCATION/TRAINING PROGRAM

## 2022-11-16 PROCEDURE — 4A023N7 MEASUREMENT OF CARDIAC SAMPLING AND PRESSURE, LEFT HEART, PERCUTANEOUS APPROACH: ICD-10-PCS | Performed by: STUDENT IN AN ORGANIZED HEALTH CARE EDUCATION/TRAINING PROGRAM

## 2022-11-16 PROCEDURE — 74011250636 HC RX REV CODE- 250/636: Performed by: STUDENT IN AN ORGANIZED HEALTH CARE EDUCATION/TRAINING PROGRAM

## 2022-11-16 PROCEDURE — 83735 ASSAY OF MAGNESIUM: CPT

## 2022-11-16 PROCEDURE — 36600 WITHDRAWAL OF ARTERIAL BLOOD: CPT

## 2022-11-16 PROCEDURE — B2151ZZ FLUOROSCOPY OF LEFT HEART USING LOW OSMOLAR CONTRAST: ICD-10-PCS | Performed by: STUDENT IN AN ORGANIZED HEALTH CARE EDUCATION/TRAINING PROGRAM

## 2022-11-16 PROCEDURE — 81001 URINALYSIS AUTO W/SCOPE: CPT

## 2022-11-16 PROCEDURE — 77030019569 HC BND COMPR RAD TERU -B: Performed by: STUDENT IN AN ORGANIZED HEALTH CARE EDUCATION/TRAINING PROGRAM

## 2022-11-16 PROCEDURE — 85520 HEPARIN ASSAY: CPT

## 2022-11-16 PROCEDURE — 82962 GLUCOSE BLOOD TEST: CPT

## 2022-11-16 PROCEDURE — 84484 ASSAY OF TROPONIN QUANT: CPT

## 2022-11-16 PROCEDURE — 99223 1ST HOSP IP/OBS HIGH 75: CPT | Performed by: THORACIC SURGERY (CARDIOTHORACIC VASCULAR SURGERY)

## 2022-11-16 PROCEDURE — 85027 COMPLETE CBC AUTOMATED: CPT

## 2022-11-16 PROCEDURE — 93306 TTE W/DOPPLER COMPLETE: CPT

## 2022-11-16 PROCEDURE — 77030010221 HC SPLNT WR POS TELE -B: Performed by: STUDENT IN AN ORGANIZED HEALTH CARE EDUCATION/TRAINING PROGRAM

## 2022-11-16 PROCEDURE — 74011636637 HC RX REV CODE- 636/637: Performed by: INTERNAL MEDICINE

## 2022-11-16 PROCEDURE — 74011250637 HC RX REV CODE- 250/637: Performed by: INTERNAL MEDICINE

## 2022-11-16 PROCEDURE — 71275 CT ANGIOGRAPHY CHEST: CPT

## 2022-11-16 RX ORDER — MIDAZOLAM HYDROCHLORIDE 1 MG/ML
INJECTION, SOLUTION INTRAMUSCULAR; INTRAVENOUS AS NEEDED
Status: DISCONTINUED | OUTPATIENT
Start: 2022-11-16 | End: 2022-11-16 | Stop reason: HOSPADM

## 2022-11-16 RX ORDER — INSULIN LISPRO 100 [IU]/ML
8 INJECTION, SOLUTION INTRAVENOUS; SUBCUTANEOUS
Status: DISCONTINUED | OUTPATIENT
Start: 2022-11-16 | End: 2022-11-17

## 2022-11-16 RX ORDER — INSULIN GLARGINE 100 [IU]/ML
70 INJECTION, SOLUTION SUBCUTANEOUS DAILY
Status: DISCONTINUED | OUTPATIENT
Start: 2022-11-17 | End: 2022-11-21

## 2022-11-16 RX ORDER — HEPARIN SODIUM 200 [USP'U]/100ML
INJECTION, SOLUTION INTRAVENOUS
Status: COMPLETED | OUTPATIENT
Start: 2022-11-16 | End: 2022-11-16

## 2022-11-16 RX ORDER — FENTANYL CITRATE 50 UG/ML
INJECTION, SOLUTION INTRAMUSCULAR; INTRAVENOUS AS NEEDED
Status: DISCONTINUED | OUTPATIENT
Start: 2022-11-16 | End: 2022-11-16 | Stop reason: HOSPADM

## 2022-11-16 RX ORDER — LIDOCAINE HYDROCHLORIDE 10 MG/ML
INJECTION, SOLUTION EPIDURAL; INFILTRATION; INTRACAUDAL; PERINEURAL AS NEEDED
Status: DISCONTINUED | OUTPATIENT
Start: 2022-11-16 | End: 2022-11-16 | Stop reason: HOSPADM

## 2022-11-16 RX ORDER — VERAPAMIL HYDROCHLORIDE 2.5 MG/ML
INJECTION, SOLUTION INTRAVENOUS AS NEEDED
Status: DISCONTINUED | OUTPATIENT
Start: 2022-11-16 | End: 2022-11-16 | Stop reason: HOSPADM

## 2022-11-16 RX ORDER — INSULIN GLARGINE 100 [IU]/ML
70 INJECTION, SOLUTION SUBCUTANEOUS
Status: DISCONTINUED | OUTPATIENT
Start: 2022-11-16 | End: 2022-11-21

## 2022-11-16 RX ADMIN — Medication 8 UNITS: at 14:12

## 2022-11-16 RX ADMIN — Medication 8 UNITS: at 17:25

## 2022-11-16 RX ADMIN — SODIUM CHLORIDE, PRESERVATIVE FREE 10 ML: 5 INJECTION INTRAVENOUS at 21:40

## 2022-11-16 RX ADMIN — BUSPIRONE HYDROCHLORIDE 15 MG: 5 TABLET ORAL at 21:40

## 2022-11-16 RX ADMIN — NITROGLYCERIN 1 INCH: 20 OINTMENT TOPICAL at 01:54

## 2022-11-16 RX ADMIN — LORAZEPAM 1 MG: 1 TABLET ORAL at 21:40

## 2022-11-16 RX ADMIN — CLOPIDOGREL BISULFATE 75 MG: 75 TABLET ORAL at 08:11

## 2022-11-16 RX ADMIN — ROSUVASTATIN CALCIUM 40 MG: 40 TABLET, FILM COATED ORAL at 10:24

## 2022-11-16 RX ADMIN — SODIUM CHLORIDE, PRESERVATIVE FREE 10 ML: 5 INJECTION INTRAVENOUS at 17:28

## 2022-11-16 RX ADMIN — IOPAMIDOL 80 ML: 755 INJECTION, SOLUTION INTRAVENOUS at 15:44

## 2022-11-16 RX ADMIN — HEPARIN SODIUM 4000 UNITS: 1000 INJECTION INTRAVENOUS; SUBCUTANEOUS at 04:03

## 2022-11-16 RX ADMIN — PANTOPRAZOLE SODIUM 40 MG: 40 TABLET, DELAYED RELEASE ORAL at 10:28

## 2022-11-16 RX ADMIN — SERTRALINE 100 MG: 50 TABLET, FILM COATED ORAL at 10:28

## 2022-11-16 RX ADMIN — BUSPIRONE HYDROCHLORIDE 15 MG: 5 TABLET ORAL at 10:27

## 2022-11-16 RX ADMIN — INSULIN GLARGINE 50 UNITS: 100 INJECTION, SOLUTION SUBCUTANEOUS at 10:24

## 2022-11-16 RX ADMIN — Medication 7 UNITS: at 11:37

## 2022-11-16 RX ADMIN — Medication 7 UNITS: at 17:26

## 2022-11-16 RX ADMIN — SODIUM CHLORIDE, PRESERVATIVE FREE 10 ML: 5 INJECTION INTRAVENOUS at 06:40

## 2022-11-16 RX ADMIN — INSULIN GLARGINE 70 UNITS: 100 INJECTION, SOLUTION SUBCUTANEOUS at 21:40

## 2022-11-16 RX ADMIN — ASPIRIN 81 MG: 81 TABLET, COATED ORAL at 08:11

## 2022-11-16 NOTE — PROGRESS NOTES
Received notification from bedside RN about patient with regards to: chest pain, requesting medication for relief.  Has documented reaction to Morphine  VS: /86, HR 84, RR 18, O2 sat 100% on RA    Intervention given: Fentanyl 50 mcg IV x 1 dose ordered

## 2022-11-16 NOTE — ED NOTES
Spoke with pharmacy to verify Heparin starting rate of 8/units/kg and then a repeat Anti Xa in 6 hours.

## 2022-11-16 NOTE — PROGRESS NOTES
1002: pt arrived to [facility] from [facility]. [email or web address] needs echo prior to determining poss surgery workup.    1251: 4cc total of air taken out of pts TR band. Mildy oozing, will re-assess in an hour. 1430: TR band taken off and covered with gauze and tegaderm. Pt educated to keep immobilizer on for atleast 4-6 hours.

## 2022-11-16 NOTE — PROGRESS NOTES
Reason for Admission:  Acute NSTEMI                     RUR Score:  9%                   Plan for utilizing home health:  Declines        PCP: First and Last name:  France Montoya MD     Name of Practice:    Are you a current patient: Yes/No:    Approximate date of last visit: 11/15/22   Can you participate in a virtual visit with your PCP:                     Current Advanced Directive/Advance Care Plan: Full Code  CM confirmed patient is a Full Code    Healthcare Decision Maker:   Click here to complete [id number] including selection of the Healthcare Decision Maker Relationship (ie \"Primary\")             Primary Decision Maker: Santos Bar - Mother - [phone]    Secondary Decision Maker: Gabriel Spanglerison - Girlfriend - [phone]                  Transition of Care Plan:                    Patient lives at home with his mother. Patient uses no DME and is independent in all care. Patient's GF will be his ride at discharge. Patient uses [facility] in [place]. Current Dispo: Home/self.

## 2022-11-16 NOTE — H&P
Hospitalist Admission Note    NAME: Aydin Powellland   :  1980   MRN:  [id number]     Date/Time:  11/15/2022 7:52 PM    Patient PCP: Helen Camargo MD  ______________________________________________________________________  Given the patient's current clinical presentation, I have a high level of concern for decompensation if discharged from the emergency department. Complex decision making was performed, which includes reviewing the patient's available past medical records, laboratory results, and x-ray films. My assessment of this patient's clinical condition and my plan of care is as follows. Assessment / Plan:  Acute non-ST elevation myocardial infarction  History of coronary artery disease s/p multiple stents  -Troponin is 740  -Start heparin drip. Continue home aspirin, Plavix, metoprolol and Crestor. Keep n.p.o. from midnight. Cardiology consulted. -Consider starting on Nitropaste if he develops any further chest pain      Shortness of breath could be anginal equivalent but will rule out congestive heart failure  -proBNP is mildly elevated at 208. Chest x-ray shows pulmonary edema versus interstitial pneumonia  -We will do Lasix 20 mg IV x1. Check 2D echocardiogram.  -Strict I's and O's, daily weights and low-salt diet  -Check procalcitonin to rule out pneumonia    Diabetes mellitus type 2 uncontrolled  -He is on insulin Toujeo at home. Will change to Lantus 100 units at bedtime. Since he will be kept n.p.o., will hold his home regular insulin with meals and instead start him on insulin sliding scale with blood sugar checks. Depression  Hypothyroidism  Hypertension  GERD  Dyslipidemia  -Continue home BuSpar, levothyroxine, metoprolol, PPI, crestor    Code Status: Full code  Surrogate Decision Maker:  Mother    DVT Prophylaxis: Heparin drip      Baseline: From home, independent of ADLs      Subjective:   CHIEF COMPLAINT: Chest pain and shortness of breath    HISTORY OF PRESENT ILLNESS:     Milka Montalvo is a 36 y.o.   male who presents with past medical history of coronary artery disease, hypertension, diabetes mellitus, extensive history of stents he is coming to the hospital chief complaint of chest pain and shortness of breath. Patient reports that chest pain has been going on for months and saw his primary care physician and was felt to be related to GI causes and was scheduled to have an endoscopy. Went to see his primary care physician today and noted to have typical cardiac chest pain along with some shortness of breath for which she was advised to go to the emergency department. He reports chest pain substernal, pressure-like pain, radiating to the left arm. He also reports some exertional shortness of breath but not much cough or phlegm. On arrival to ED, vital signs are within normal, on labs CBC is normal.  BMP shows sodium of 132, glucose of 297, creatinine of 1.10. Troponin is 714. proBNP is 208. Chest x-ray shows mild interstitial pulmonary edema versus interstitial pneumonia no pneumothorax. We were asked to admit for work up and evaluation of the above problems.      Past Medical History:   Diagnosis Date    Anxiety and depression     anxiety, depression    Bleeding of eye, left     8/17/21 pt reports receiving injections in right eye for beeding    Chronic headaches 2007    COVID-19 12/20/2020    Diabetes type 1, uncontrolled     since 8years old    GERD (gastroesophageal reflux disease)     Hypercholesterolemia     Hypertension     Hypothyroidism     MI (myocardial infarction) ([facility] [address]     as of 8/17/21 pt reports 8 stents total    WALLY on CPAP         Past Surgical History:   Procedure Laterality Date    HX CARPAL TUNNEL RELEASE Left 2012    HX CARPAL TUNNEL RELEASE Right 2018    CTE trigger thumb and index finger    HX HEART CATHETERIZATION      HX HEENT      HX LAP CHOLECYSTECTOMY  8/26/14    Dr Burgess Mitchell     [facility] Social History     Tobacco Use    Smoking status: Former     Packs/day: 0.00     Years: 14.00     Pack years: 0.00     Types: Cigarettes     Quit date: 2014     Years since quittin.8    Smokeless tobacco: Never   Substance Use Topics    Alcohol use: No        Family History   Problem Relation Age of Onset    Diabetes Mother     Hypertension Mother     Heart Disease Father     Cancer Father     Diabetes Father     Other Father         MAC    Diabetes Paternal Aunt     Diabetes Maternal Grandmother     Thyroid Cancer Maternal Grandmother     Kidney Disease Maternal Grandmother     Arthritis Maternal Grandmother     Heart Disease Maternal Grandfather     High Cholesterol Maternal Grandfather     Hypertension Maternal Grandfather     Diabetes Paternal Grandmother     Hemophilia Paternal Grandfather      Allergies   Allergen Reactions    Morphine Nausea and Vomiting    Penicillin G Swelling    Percocet [Oxycodone-Acetaminophen] Hives and Nausea and Vomiting     Pt is allergic to Oxycodone, has previously tolerated Tylenol and Hydrocodone. Sulfa (Sulfonamide Antibiotics) Swelling    Tramadol Nausea Only     Nausea and headache          Prior to Admission medications    Medication Sig Start Date End Date Taking? Authorizing Provider   metFORMIN (GLUCOPHAGE) 500 mg tablet TAKE 1 TABLET BY MOUTH TWICE DAILY WITH MEALS 22   Gary Gagnon MD   insulin glargine U-300 conc (Toujeo Max U-300 SoloStar) 300 unit/mL (3 mL) inpn Take 140 units every day (new dosage) 10/31/22   Gary Gagnon MD   LORazepam (ATIVAN) 1 mg tablet Take 1 Tablet by mouth every eight (8) hours as needed for Anxiety. Max Daily Amount: 3 mg. 10/13/22   Anika Fernandez NP   sertraline (ZOLOFT) 100 mg tablet Take 1 Tablet by mouth daily. 10/13/22   Burgess Elysia Fernandez NP   sertraline (ZOLOFT) 50 mg tablet Take 1 Tablet by mouth daily.  10/13/22   Burgess Elysia Fernandez NP   alum-mag hydroxide-simeth (MYLANTA) 200-200-20 mg/5 mL susp Take 30 mL by mouth every four (4) hours as needed for Indigestion. 9/14/22   Evert Candelario MD   levothyroxine (SYNTHROID) 125 mcg tablet Take 1 Tablet by mouth Daily (before breakfast). 9/13/22   Kaleigh Lewis MD   insulin regular (NOVOLIN R, HUMULIN R) 100 unit/mL injection 40 units with each meal, plus correction. Max daily dose of 150 units. 8/18/22   Kaleigh Lewis MD   busPIRone (BUSPAR) 15 mg tablet Take 1 Tablet by mouth three (3) times daily. 8/12/22   Abrahma Whelan MD   Insulin Needles, Disposable, 32 gauge x 5/32\" ndle 5 shots per day (dispense whatever brand is covered by his insurance) 8/2/22   Kaleigh Lewis MD   lisinopriL (PRINIVIL, ZESTRIL) 10 mg tablet Take 10 mg by mouth daily. 6/24/22   Provider, Historical   testosterone (ANDROGEL) 20.25 mg/1.25 gram (1.62 %) gel Apply 4 pump presses daily. Ok to dispense generic testosterone. 6/29/22   Kaleigh Lewis MD   insulin U-500 syringe-needle (BD Insulin Syringe U-500) 1/2 mL 31 gauge x 15/64\" syrg Three shots per day. 6/29/22   Kaleigh Lewis MD   butalbital-acetaminophen-caffeine (FIORICET, ESGIC) -40 mg per tablet Take 1 Tablet by mouth every six (6) hours as needed for Headache. Indications: a migraine headache 6/13/22   Sharee Cuenca NP   esomeprazole (NEXIUM) 40 mg capsule TAKE 1 CAPSULE BY MOUTH ONCE DAILY BEFORE BREAKFAST DO NOT OPEN CAPSULE 4/27/22   Provider, Historical   metoprolol succinate (TOPROL-XL) 25 mg XL tablet Take 25 mg by mouth two (2) times a day. Provider, Historical   rosuvastatin (CRESTOR) 40 mg tablet Take 40 mg by mouth daily. 2/16/22   Provider, Historical   famotidine (PEPCID) 40 mg tablet Take 1 Tablet by mouth daily. 1/19/22   Kiara Hurst MD   clopidogreL (PLAVIX) 75 mg tab Take 1 Tablet by mouth daily. 10/29/21   Ayla Caal MD   cholecalciferol (Vitamin D3) (5000 Units/125 mcg) tab tablet Take 1 Tab by mouth daily.  3/10/21   Esperanza Colon MD   Insulin Syringe-Needle U-100 (BD Insulin Syringe) 1 mL 25 x 1\" syrg Use for drawing up/injecting testosterone once weekly. 12/7/20   Wilma Araujo MD   nitroglycerin (NITROSTAT) 0.4 mg SL tablet Take 1 Tab by mouth every five (5) minutes as needed for Chest Pain. Sit down then put one tab under the tongue every 5 minutes as needed 12/4/19   Rambo Marroquin MD   aspirin delayed-release 81 mg tablet Take 1 Tab by mouth daily. 9/24/19   Regine Verduzco MD       REVIEW OF SYSTEMS:     I am not able to complete the review of systems because:    The patient is intubated and sedated    The patient has altered mental status due to his acute medical problems    The patient has baseline aphasia from prior stroke(s)    The patient has baseline dementia and is not reliable historian    The patient is in acute medical distress and unable to provide information           Total of 12 systems reviewed as follows:       POSITIVE= underlined text  Negative = text not underlined  General:  fever, chills, sweats, generalized weakness, weight loss/gain,      loss of appetite   Eyes:    blurred vision, eye pain, loss of vision, double vision  ENT:    rhinorrhea, pharyngitis   Respiratory:   cough, sputum production, SOB, COLE, wheezing, pleuritic pain   Cardiology:   chest pain, palpitations, orthopnea, PND, edema, syncope   Gastrointestinal:  abdominal pain , N/V, diarrhea, dysphagia, constipation, bleeding   Genitourinary:  frequency, urgency, dysuria, hematuria, incontinence   Muskuloskeletal :  arthralgia, myalgia, back pain  Hematology:  easy bruising, nose or gum bleeding, lymphadenopathy   Dermatological: rash, ulceration, pruritis, color change / jaundice  Endocrine:   hot flashes or polydipsia   Neurological:  headache, dizziness, confusion, focal weakness, paresthesia,     Speech difficulties, memory loss, gait difficulty  Psychological: Feelings of anxiety, depression, agitation    Objective:   VITALS:    Visit Vitals  BP Kat Ellison ) 146/86 (BP 1 Location: Left arm, BP Patient Position: At rest)   Pulse 84   Temp 97.9 °F (36.6 °C)   Resp 18   Ht 5' 9\" (1.753 m)   Wt 117 kg (257 lb 15 oz)   SpO2 100%   BMI 38.09 kg/m²       PHYSICAL EXAM:    General:    Alert, cooperative, no distress, appears stated age. HEENT: Atraumatic, anicteric sclerae, pink conjunctivae     No oral ulcers, mucosa moist  Neck:  Supple, symmetrical,  thyroid: non tender  Lungs:   Bilateral air entry present, crackles present, no wheezing  Chest wall:  No tenderness  No Accessory muscle use. Heart:   Regular  rhythm,  No  murmur   No edema  Abdomen:   Soft, non-tender. Not distended. Bowel sounds normal  Extremities: No cyanosis. No clubbing,      Skin turgor normal, Capillary refill normal, Radial dial pulse 2+  Skin:     Not pale. Not Jaundiced  No rashes   Psych:  Not anxious or agitated. Neurologic: EOMs intact. No facial asymmetry. No aphasia or slurred speech. Symmetrical strength, Sensation grossly intact.  Alert and oriented X 4.     _______________________________________________________________________  Care Plan discussed with:    Comments   Patient y    Family      RN y    Care Manager                    Consultant:      _______________________________________________________________________  Expected  Disposition:   Home with Family y   HH/PT/OT/RN    SNF/LTC    ZEUS    ________________________________________________________________________  TOTAL TIME:  61  Minutes    Critical Care Provided     Minutes non procedure based      Comments    y Reviewed previous records   >50% of visit spent in counseling and coordination of care y Discussion with patient and/or family and questions answered       ________________________________________________________________________  Signed: Nicolasa Nazario MD    Procedures: see electronic medical records for all procedures/Xrays and details which were not copied into this note but were reviewed prior to creation of Plan.    LAB DATA REVIEWED:    Recent Results (from the past 24 hour(s))   EKG, 12 LEAD, INITIAL    Collection Time: 11/15/22  5:28 PM   Result Value Ref Range    Ventricular Rate 83 BPM    Atrial Rate 83 BPM    P-R Interval 160 ms    QRS Duration 84 ms    Q-T Interval 358 ms    QTC Calculation (Bezet) 420 ms    Calculated P Axis 50 degrees    Calculated R Axis 3 degrees    Calculated T Axis 126 degrees    Diagnosis       Normal sinus rhythm  Left ventricular hypertrophy with repolarization abnormality  When compared with ECG of 14-SEP-2022 12:27,  No significant change was found     CBC WITH AUTOMATED DIFF    Collection Time: 11/15/22  5:34 PM   Result Value Ref Range    WBC 8.7 4.1 - 11.1 K/uL    RBC 4.80 4. 10 - 5.70 M/uL    HGB 13.2 12.1 - 17.0 g/dL    HCT 39.7 36.6 - 50.3 %    MCV 82.7 80.0 - 99.0 FL    MCH 27.5 26.0 - 34.0 PG    MCHC 33.2 30.0 - 36.5 g/dL    RDW 14.2 11.5 - 14.5 %    PLATELET 032 437 - 805 K/uL    MPV 10.1 8.9 - 12.9 FL    NRBC 0.0 0  WBC    ABSOLUTE NRBC 0.00 0.00 - 0.01 K/uL    NEUTROPHILS 61 32 - 75 %    LYMPHOCYTES 24 12 - 49 %    MONOCYTES 9 5 - 13 %    EOSINOPHILS 4 0 - 7 %    BASOPHILS 1 0 - 1 %    IMMATURE GRANULOCYTES 1 (H) 0.0 - 0.5 %    ABS. NEUTROPHILS 5.4 1.8 - 8.0 K/UL    ABS. LYMPHOCYTES 2.1 0.8 - 3.5 K/UL    ABS. MONOCYTES 0.7 0.0 - 1.0 K/UL    ABS. EOSINOPHILS 0.3 0.0 - 0.4 K/UL    ABS. BASOPHILS 0.1 0.0 - 0.1 K/UL    ABS. IMM.  GRANS. 0.1 (H) 0.00 - 0.04 K/UL    DF AUTOMATED     METABOLIC PANEL, COMPREHENSIVE    Collection Time: 11/15/22  5:34 PM   Result Value Ref Range    Sodium 132 (L) 136 - 145 mmol/L    Potassium 4.4 3.5 - 5.1 mmol/L    Chloride 98 97 - 108 mmol/L    CO2 31 21 - 32 mmol/L    Anion gap 3 (L) 5 - 15 mmol/L    Glucose 297 (H) 65 - 100 mg/dL    BUN 21 (H) 6 - 20 MG/DL    Creatinine 1.10 0.70 - 1.30 MG/DL    BUN/Creatinine ratio 19 12 - 20      eGFR >60 >60 ml/min/1.73m2    Calcium 9.3 8.5 - 10.1 MG/DL    Bilirubin, total 0.5 0.2 - 1.0 MG/DL    ALT (SGPT) 48 12 - 78 U/L    AST (SGOT) 17 15 - 37 U/L    Alk. phosphatase 112 45 - 117 U/L    Protein, total 7.2 6.4 - 8.2 g/dL    Albumin 3.8 3.5 - 5.0 g/dL    Globulin 3.4 2.0 - 4.0 g/dL    A-G Ratio 1.1 1.1 - 2.2     NT-PRO BNP    Collection Time: 11/15/22  5:34 PM   Result Value Ref Range    NT pro- (H) <125 PG/ML   TROPONIN-HIGH SENSITIVITY    Collection Time: 11/15/22  5:34 PM   Result Value Ref Range    Troponin-High Sensitivity 714 (HH) 0 - 76 ng/L   CBC WITH AUTOMATED DIFF    Collection Time: 11/15/22  7:39 PM   Result Value Ref Range    WBC 8.3 4.1 - 11.1 K/uL    RBC 4.77 4.10 - 5.70 M/uL    HGB 13.2 12.1 - 17.0 g/dL    HCT 38.8 36.6 - 50.3 %    MCV 81.3 80.0 - 99.0 FL    MCH 27.7 26.0 - 34.0 PG    MCHC 34.0 30.0 - 36.5 g/dL    RDW 14.0 11.5 - 14.5 %    PLATELET 328 341 - 710 K/uL    MPV 10.1 8.9 - 12.9 FL    NRBC 0.0 0  WBC    ABSOLUTE NRBC 0.00 0.00 - 0.01 K/uL    NEUTROPHILS 63 32 - 75 %    LYMPHOCYTES 24 12 - 49 %    MONOCYTES 7 5 - 13 %    EOSINOPHILS 4 0 - 7 %    BASOPHILS 1 0 - 1 %    IMMATURE GRANULOCYTES 1 (H) 0.0 - 0.5 %    ABS. NEUTROPHILS 5.3 1.8 - 8.0 K/UL    ABS. LYMPHOCYTES 2.0 0.8 - 3.5 K/UL    ABS. MONOCYTES 0.6 0.0 - 1.0 K/UL    ABS. EOSINOPHILS 0.3 0.0 - 0.4 K/UL    ABS. BASOPHILS 0.0 0.0 - 0.1 K/UL    ABS. IMM. GRANS. 0.1 (H) 0.00 - 0.04 K/UL    DF AUTOMATED     SAMPLES BEING HELD    Collection Time: 11/15/22  7:39 PM   Result Value Ref Range    SAMPLES BEING HELD 2PST, RED, SST, BLUE     COMMENT        Add-on orders for these samples will be processed based on acceptable specimen integrity and analyte stability, which may vary by analyte.

## 2022-11-16 NOTE — ED NOTES
Bedside  report given to Cinthia Jenkins (oncoming nurse) by Flavia Murphy (offgoing nurse). Report included the following information SBAR, ED Summary, MAR and Recent Results.

## 2022-11-16 NOTE — PROGRESS NOTES
Brief Procedure Note:         Indication:   CP,.  SOB    Procedure:   LHC, Cors   LV gram   AV study     Complications: None   Blood loss: Minimal   Condition: Stable     Brief procedure Result:     Patent LAD stent with mild to moderate diffuse disease   Jailed small diagonal branch with severe ostial narrowing   Mild RCA   Severe aortic stenosis with mean gradient 50.50 mm Hg   Elevated LVEDP   Dilated ascending aorta     Recommendations:     Severe AS - with mean gradient 50.50, also dilated ascending aorta by LV gram   Needs Echo   If Confirmed severe AS (could be bicuspid valve) then consider for surgery then IFR of LAD/ RCA should be considered to eval for hemodynamically significant   Discussed with Dr Marija Chaparro, D/w Dr Cristobal Liz       Full note and recommendations to follow. '

## 2022-11-16 NOTE — CONSULTS
Patient: Ramsey Cole   Age: 36 y.o. Patient Care Team:  Cameron Blackburn MD as PCP - General (Internal Medicine Physician)  Cameron Blackburn MD as PCP - [address] [place] [address]  [facility] Provider  Gareth Serna MD (Neurology)  Leisa Ovalle MD as Consulting Provider (Endocrinology Physician)  Melia Edmonds MD (Cardiovascular Disease Physician)  Brittaney Brewster MD as Physician (Psychiatry)    PCP: Cameron Blackburn MD    Cardiologist: Dr. Omi Story    Diagnosis/Reason for Consultation: The primary encounter diagnosis was NSTEMI (non-ST elevated myocardial infarction) [facility]). A diagnosis of Unstable angina (HCC) was also pertinent to this visit. Problem List:   Patient Active Problem List   Diagnosis Code    DM [facility]diabetes mellitus) ([facility] E11.9    Acquired hypothyroidism E03.9    Essential hypertension I10    Fibromyalgia M79.7    Microalbuminuria R80.9    Anxiety F41.9    Migraine without aura G43.009    Hypertriglyceridemia E78.1    Severe obesity ([facility]) E66.01    Transient ischemic attack involving left internal carotid artery G45. 1    Chest pain R07.9    Unstable angina ([facility]) I20.0    Coronary artery disease involving native coronary artery of native heart with unstable angina pectoris ([facility]) I25.110    Type 2 diabetes with nephropathy ([facility]) E11.21    Dysthymia F34.1    Adjustment disorder with mixed emotional features F43.29    Anxiety disorder due to general medical condition with panic attack F06.4, F41.0    Insomnia disorder with non-sleep disorder mental comorbidity G47.00    Dizzy spells R42    Multiple lacunar infarcts ([facility]) I63.81    Cervical spondylosis with radiculopathy M47.22    Low back pain M54.50    Corneal abrasion, right S05. 01XA    Stable proliferative diabetic retinopathy of both eyes associated with type 1 diabetes mellitus ([facility] Z00.3526    Bilateral carotid artery stenosis I65.23    Acute non-Q wave non-ST elevation myocardial infarction (NSTEMI) (HCC) I21.4         HPI: 36 y.o. male who presents with past medical history of coronary artery disease, hypertension, XOL, diabetes mellitus, extensive history of stents, GERD, WALLY not on CPAP, former smoker, hypothyroidism, anxiety and depression, that is referred to the [address] [place] [address] by Dr. Nadiya Turner for interventional evaluation of AS. Patient presented to the emergency department on 11/15/22 with c/o CP and SOB. Patient has been having issues with the same since 2019. Around 2019, he had 8 stents placed; he later needed another stent in 2021. Presented to emergency department again on 9/15/22 with CP, but was sent home when his troponin and EKG were negative. They told him at that time that it may be heart burn. Since then, he has been working with Dr. Jenny Fabian with GI at [address] [place] [address]. He underwent a Upper GI with small bowel follow-through Double contrast on 22, which showed the followin. Mild esophageal dysmotility. 2. Gastric wall thickening may be exaggerated by incomplete distention, but gastritis is possible. Superimposed subcentimeter ulcerations in the distal stomach not excluded. Recommend further evaluation with endoscopy. 3. Mildly patulous thoracic esophagus with mild tapering distally. Apparent mural irregularity in the distal esophagus, seen only on a single image. This could also be better assessed endoscopically. 4. Borderline rapid small bowel transit time. No evidence of small bowel obstruction. 5. Patchy opacity in the left lung base. Recommend dedicated chest radiograph. (C&P from [facility] note)    He was started on PPIs (Nexium in the morning; 1 Prilosec in the afternoon and 1 Prilosec at bedtime; additionally, he was going through 1.5 bottles of Maalox per week) but continued to have symptoms, which he described as a pain in the epigastric area, that radiated to the back and down both arms; it would actually start to feel like both arms would go numb.  Maalox and rest would sometimes give him relief; any kind of activity would make the discomfort worse. Plan was for a scope, which he was in the process of scheduling the day of admission. Went to see his primary care physician (Dr. Radha Gilliam) again on the day of admission and was noted to have typical cardiac chest pain (substernal, pressure-like pain, radiating to the left arm) along with some shortness of breath for which she was advised to go to the emergency department. CP was associated with some exertional shortness of breath but not much cough or phlegm. On arrival to ED, vital signs were within normal. On labs CBC was normal.  BMP showed sodium of 132, glucose of 297, creatinine of 1.10. Troponin was 714 and  proBNP was 208. Chest x-ray showed mild interstitial pulmonary edema versus interstitial pneumonia no pneumothorax. He was admitted for acute NSTEMI, and CHF r/o. He was started on a heparin gtt and home ASA, Plavix, BB and statin were resumed. Cardiology was consulted, an echocardiogram was ordered and he was given a one time dose of IV Lasix. He developed CP again overnight which was treated with nitro paste. He had a LHC today which showed Patent LAD stent with mild to moderate diffuse disease, jailed small diagonal branch with severe ostial narrowing, mild RCA, severe aortic stenosis with mean gradient 50.50 mm Hg, an elevated LVEDP, and a dilated ascending aorta. Endorses dizziness/lightheadedness, fatigue, CP, and SOB. Symptoms are intermittent. Denies syncope, PND, claudication, and LE edema. Endorses thyroid dz, tobacco abuse (Smoked half PPD x 18 years.  Quit in ), family hx of cardiac dx (father- first MI at 48-  from [facility]; maternal grandfather  at 62 from MI), and WALLY (not on CPAP)  Denies hx of CVA/TIA, difficulty swallowing, lung dz, previous thoracic surgery, liver dz, alcohol abuse, drug abuse, kidney dz, and PAD/PVD or vein stripping    Functional status: Independent and ambulatory without assistive devices   Activity level: Assists mother around the house and yard. Social support:  He is . He lives with his mother. Occupation: He is in the process of trying to obtain disability   COVID vaccination status: Unvaccinated     (+) Medication changes in past 3 months  (-) Blood/Blackness/Tarriness of stools  Heart Failure Admission w/in past year: [facility]- echo pending   Heart Failure Admission w/in past 2 weeks: See above         NYHA Classification: III   Class I (Mild): No limitation of physical activity. Ordinary physical activity does not cause undue fatigue, palpitation, or dyspnea. Class II (Mild): Slight limitation of physical activity. Comfortable at rest, but ordinary physical activity results in fatigue, palpitation, or dyspnea. Class III (Moderate): Marked limitation of physical activity. Comfortable at rest, but less than ordinary activity causes fatigue, palpitation, or dyspnea   Class IV (Severe): Unable to carry out any physical activity without discomfort. Symptoms  of cardiac insufficiency at rest.  If any physical activity is undertaken, discomfort is increased.     Angina Classification: 3   Class 0: No symptoms   Class 1: Angina with strenuous exercise   Class 2: Angina with moderate exercise   Class 3: Angina with mild exertion   Walking 1-2 level blocks at normal pace; Climbing 1 flight of stairs at normal pace  Class 4: Angina at any level of physical exertion       Past Medical History:   Diagnosis Date    Anxiety and depression     anxiety, depression    Bleeding of eye, left     8/17/21 pt reports receiving injections in right eye for beeding    Chronic headaches 2007    COVID-19 12/20/2020    Diabetes type 1, uncontrolled     since 8years old    GERD (gastroesophageal reflux disease)     Hypercholesterolemia     Hypertension     Hypothyroidism     MI (myocardial infarction) ([facility]     as of 8/17/21 pt reports 8 stents total    WALLY on CPAP Endocarditis: no  Pulmonary HTN:  unknown Greater than 2/3 systemic: unknown  Immunocompromised/Steroids: no  Liver Dz/Cirrhosis: no   If yes, MELD score:  N/A  Last Dental Visit:  \"It's been a couple of years\"   Any dental pain/concerns: no    Past Surgical History:   Procedure Laterality Date    HX CARPAL TUNNEL RELEASE Left     HX CARPAL TUNNEL RELEASE Right 2018    CTE trigger thumb and index finger    HX HEART CATHETERIZATION      HX HEENT      HX LAP CHOLECYSTECTOMY  14    Dr Anish Caban    HX WISDOM TEETH EXTRACTION        Social History     Tobacco Use    Smoking status: Former     Packs/day: 0.00     Years: 14.00     Pack years: 0.00     Types: Cigarettes     Quit date: 2014     Years since quittin.8    Smokeless tobacco: Never   Substance Use Topics    Alcohol use: No      Family History   Problem Relation Age of Onset    Diabetes Mother     Hypertension Mother     Heart Disease Father     Cancer Father     Diabetes Father     Other Father         MAC    Diabetes Paternal Aunt     Diabetes Maternal Grandmother     Thyroid Cancer Maternal Grandmother     Kidney Disease Maternal Grandmother     Arthritis Maternal Grandmother     Heart Disease Maternal Grandfather     High Cholesterol Maternal Grandfather     Hypertension Maternal Grandfather     Diabetes Paternal Grandmother     Hemophilia Paternal Grandfather      Prior to Admission medications    Medication Sig Start Date End Date Taking? Authorizing Provider   metFORMIN (GLUCOPHAGE) 500 mg tablet TAKE 1 TABLET BY MOUTH TWICE DAILY WITH MEALS 22   Maeve Phillips MD   insulin glargine U-300 conc (Toujeo Max U-300 SoloStar) 300 unit/mL (3 mL) inpn Take 140 units every day (new dosage) 10/31/22   Maeve Phillips MD   LORazepam (ATIVAN) 1 mg tablet Take 1 Tablet by mouth every eight (8) hours as needed for Anxiety.  Max Daily Amount: 3 mg. 10/13/22   Maria Esther Fernandez NP   sertraline (ZOLOFT) 100 mg tablet Take 1 Tablet by mouth daily. 10/13/22   Olayinka Fernandez NP   sertraline (ZOLOFT) 50 mg tablet Take 1 Tablet by mouth daily. 10/13/22   Olayinka Fernandez NP   alum-mag hydroxide-simeth (MYLANTA) 200-200-20 mg/5 mL susp Take 30 mL by mouth every four (4) hours as needed for Indigestion. 9/14/22   Kateryna Candelario MD   levothyroxine (SYNTHROID) 125 mcg tablet Take 1 Tablet by mouth Daily (before breakfast). 9/13/22   Javid Bustamante MD   insulin regular (NOVOLIN R, HUMULIN R) 100 unit/mL injection 40 units with each meal, plus correction. Max daily dose of 150 units. 8/18/22   Javid Bustamante MD   busPIRone (BUSPAR) 15 mg tablet Take 1 Tablet by mouth three (3) times daily. 8/12/22   Monica Nur MD   Insulin Needles, Disposable, 32 gauge x 5/32\" ndle 5 shots per day (dispense whatever brand is covered by his insurance) 8/2/22   Javid Bustamante MD   lisinopriL (PRINIVIL, ZESTRIL) 10 mg tablet Take 10 mg by mouth daily. 6/24/22   Provider, Historical   testosterone (ANDROGEL) 20.25 mg/1.25 gram (1.62 %) gel Apply 4 pump presses daily. Ok to dispense generic testosterone. 6/29/22   Javid Bustamante MD   insulin U-500 syringe-needle (BD Insulin Syringe U-500) 1/2 mL 31 gauge x 15/64\" syrg Three shots per day. 6/29/22   Javid Bustamante MD   butalbital-acetaminophen-caffeine (FIORICET, ESGIC) -40 mg per tablet Take 1 Tablet by mouth every six (6) hours as needed for Headache. Indications: a migraine headache 6/13/22   Marcia Trujillo NP   esomeprazole (NEXIUM) 40 mg capsule TAKE 1 CAPSULE BY MOUTH ONCE DAILY BEFORE BREAKFAST DO NOT OPEN CAPSULE 4/27/22   Provider, Historical   metoprolol succinate (TOPROL-XL) 25 mg XL tablet Take 25 mg by mouth two (2) times a day. Provider, Historical   rosuvastatin (CRESTOR) 40 mg tablet Take 40 mg by mouth daily. 2/16/22   Provider, Historical   famotidine (PEPCID) 40 mg tablet Take 1 Tablet by mouth daily.  1/19/22   Cristine Hurst MD   clopidogreL (PLAVIX) 75 mg tab Take 1 Tablet by mouth daily. 10/29/21   Mitra Borden MD   cholecalciferol (Vitamin D3) (5000 Units/125 mcg) tab tablet Take 1 Tab by mouth daily. 3/10/21   Twyla Hurst MD   Insulin Syringe-Needle U-100 (BD Insulin Syringe) 1 mL 25 x 1\" syrg Use for drawing up/injecting testosterone once weekly. 12/7/20   Katheryn Ellison MD   nitroglycerin (NITROSTAT) 0.4 mg SL tablet Take 1 Tab by mouth every five (5) minutes as needed for Chest Pain. Sit down then put one tab under the tongue every 5 minutes as needed 12/4/19   Rosario Ortiz MD   aspirin delayed-release 81 mg tablet Take 1 Tab by mouth daily. 9/24/19   Regine Verduzco MD       Allergies   Allergen Reactions    Morphine Nausea and Vomiting    Penicillin G Swelling    Percocet [Oxycodone-Acetaminophen] Hives and Nausea and Vomiting     Pt is allergic to Oxycodone, has previously tolerated Tylenol and Hydrocodone.     Sulfa (Sulfonamide Antibiotics) Swelling    Tramadol Nausea Only     Nausea and headache         Current Medications:   Current Facility-Administered Medications   Medication Dose Route Frequency Provider Last Rate Last Admin    nitroglycerin (NITROBID) 2 % ointment 1 Inch  1 Inch Topical Q6H PRN Angela Singleton, NP   1 Inch at 11/16/22 0154    heparin 25,000 units in D5W 250 ml infusion  8-25 Units/kg/hr IntraVENous TITRATE Joshua Patel MD   Stopped at 11/16/22 0815    heparin (porcine) 1,000 unit/mL injection 2,000 Units  2,000 Units IntraVENous PRN Joshua Patel MD        Or    heparin (porcine) 1,000 unit/mL injection 4,000 Units  4,000 Units IntraVENous PRN Joshua Patel MD   4,000 Units at 11/16/22 0403    aspirin delayed-release tablet 81 mg  81 mg Oral DAILY Joshua Patel MD   81 mg at 11/16/22 8959    clopidogreL (PLAVIX) tablet 75 mg  75 mg Oral DAILY Joshua Patel MD   75 mg at 11/16/22 0811    pantoprazole (PROTONIX) tablet 40 mg  40 mg Oral ACB Joshua Patel MD        levothyroxine (SYNTHROID) tablet 125 mcg 125 mcg Oral ACB Dimple Grimaldo MD        LORazepam (ATIVAN) tablet 1 mg  1 mg Oral Q8H PRN Petrona Merida MD        metoprolol succinate (TOPROL-XL) XL tablet 25 mg  25 mg Oral BID Petrona Merida MD   25 mg at 11/15/22 2038    rosuvastatin (CRESTOR) tablet 40 mg  40 mg Oral DAILY Petrona Merida MD        sertraline (ZOLOFT) tablet 100 mg  100 mg Oral DAILY Dimple Grimaldo MD        sodium chloride (NS) flush 5-40 mL  5-40 mL IntraVENous Q8H Petrona Merida MD   10 mL at 11/16/22 0640    sodium chloride (NS) flush 5-40 mL  5-40 mL IntraVENous PRN Petrona Merida MD        acetaminophen (TYLENOL) tablet 650 mg  650 mg Oral Q6H PRN Petrona Merida MD        Or    acetaminophen (TYLENOL) suppository 650 mg  650 mg Rectal Q6H PRN Petrona Merida MD        polyethylene glycol (MIRALAX) packet 17 g  17 g Oral DAILY PRN Petrona Merida MD        ondansetron (ZOFRAN ODT) tablet 4 mg  4 mg Oral Q8H PRN Petrona Merida MD        Or    ondansetron (ZOFRAN) injection 4 mg  4 mg IntraVENous Q6H PRN Petrona Merida MD        insulin lispro (HUMALOG) injection   SubCUTAneous AC&HS Petrona Merida MD        glucose chewable tablet 16 g  4 Tablet Oral PRN Dimple Bernal MD        glucagon (GLUCAGEN) injection 1 mg  1 mg IntraMUSCular PRN Petrona Merida MD        dextrose 10% infusion 0-250 mL  0-250 mL IntraVENous PRN Petrona Merida MD        furosemide (LASIX) injection 20 mg  20 mg IntraVENous BID Petrona Merida MD        busPIRone (BUSPAR) tablet 15 mg  15 mg Oral TID Petrona Merida MD   15 mg at 11/15/22 2324    insulin glargine (LANTUS) injection 50 Units  50 Units SubCUTAneous DAILY Dimple Grimaldo MD        And    insulin glargine (LANTUS) injection 50 Units  50 Units SubCUTAneous QHS Petrona Merida MD           Vitals: Blood pressure 99/66, pulse 67, temperature 97.5 °F (36.4 °C), resp. rate 17, height 5' 9\" (1.753 m), weight 257 lb 15 oz (117 kg), SpO2 94 %.        Allergies: is allergic to morphine, penicillin g, percocet [oxycodone-acetaminophen], sulfa (sulfonamide antibiotics), and tramadol. Review of Systems: Pertinent Positives/Negatives per HPI   [] Unable to obtain  ROS due to  []mental status change  []sedated   []intubated   [x]Total of 13 systems reviewed as follows:  Constitutional: Negative fever, negative chills  Eyes:   Negative for amauroses fugax  ENT:   Negative sore throat,oral abscess   Endocrine Negative for thyroid replacement Rx; goiter; DM  Respiratory:  Negative chronic cough,sputum production  Cards:   Negative for palpitations, varicosities, claudication, lower extremity edema  GI:   Negative for dysphagia, bleeding, nausea, vomiting, diarrhea, and abdominal pain  Genitourinary: Negative for frequency, dysuria, BPH   Integument:  Negative for rash and pruritus  Hematologic:  Negative for easy bruising; bleeding dyscrasia   Musculoskel: Negative for muscle weakness inhibiting ambulation  Neurological:  Negative for stroke, TIA, syncope, dizziness  Behavl/Psych: Negative for feelings of anxiety, depression     Cardiovascular Testing:     EKG:   EKG RESULTS       Procedure Component Value Units Date/Time    EKG, 12 LEAD, INITIAL [[id number]] Collected: 11/15/22 1728    Order Status: Completed Updated: 11/15/22 1729     Ventricular Rate 83 BPM      Atrial Rate 83 BPM      P-R Interval 160 ms      QRS Duration 84 ms      Q-T Interval 358 ms      QTC Calculation (Bezet) 420 ms      Calculated P Axis 50 degrees      Calculated R Axis 3 degrees      Calculated T Axis 126 degrees      Diagnosis --     Normal sinus rhythm  Left ventricular hypertrophy with repolarization abnormality  When compared with ECG of 14-SEP-2022 12:27,  No significant change was found                TTE:  11/28/19    ECHO ADULT COMPLETE 11/29/2019 11/29/2019    Interpretation Summary  · Left Ventricle: Normal cavity size, systolic function (ejection fraction normal) and diastolic function. Moderate concentric hypertrophy.  Estimated left ventricular ejection fraction is 61 - 65%. · Mitral Valve: Trace mitral valve regurgitation is present. Signed by: Boogie Mendoza MD on 11/29/2019 12:26 PM    Repeat pending        SYLWIA: None    Cardiac catheterization: 10/27/21    CARDIAC PROCEDURE 11/03/2021 11/3/2021    Conclusion  L Cath    PCI of Cx Marginal for in stent restenosis . Stented RCA with mild disease      RCA with Anterior origin . AL1 catheter to access. Short Left Main normal    Stented LAD with mild  Disease. Stented Cx Marginal with 80% stenosis . PCI with 2.5 xc  18 Navneet OG with 0% residual   PHIL 3 flow    No V  Gram    R Radial    Heparin    Plavix . Signed by: Boogie Mendoza MD on 11/3/2021  4:56 PM    Repeat pending    Carotid Dopplers: pending    PFTs: FEV1/Predicted:  Pending  Normal FEV1 > 1 Liter, Predicted = 100%, DLCO > 80%    Mild Lung Disease (60-70% Predicted)    Moderate Lung Disease (50-55% Predicted)    Severe Lung Disease (< 50% Predicted or PO2 < 60 or pCO2>50 on RA)    CTA chest: pending    Physical Exam:  General: Awake, alert, oriented  and no acute distress. Neuro:  Gross motor and sensory apparatus intact   Neck:  Supple, trachea Midline   Heart:  Regular rate and rhythm  and grade II murmur right of the sternal border, 2nd ICS   Lungs:    Clear to auscultation bilaterally   Abdomen:    Soft, non-distended, non-tender and obese   Extremities:  Non-pitting edema BLE and skin on RLL noted to be shiny    Skin:  No obvious s/s of infection or breakdown   MSK:  FROM in all major joints. Dulce muscle tone.         STS 4.2 Risk Score / Predicted 30 day mortality: - calculations scanned into EMR      STS Adult Cardiac Surgery Database Version 4.20  RISK SCORES  Procedure: Isolated AVR  Risk of Mortality:  1.469%  Renal Failure:  3.641%  Permanent Stroke:  0.794%  Prolonged Ventilation:  5.466%  DSW Infection:  0.227%  Reoperation:  3.078%  Morbidity or Mortality:  8.995%  Short Length of Stay:  51.986%  Long Length of Stay:  3.063%      STS Adult Cardiac Surgery Database Version 4.20  RISK SCORES  Procedure: AVR + CAB (if needed)  Risk of Mortality:  2.932%  Renal Failure:  4.759%  Permanent Stroke:  1.247%  Prolonged Ventilation:  10.871%  DSW Infection:  0.379%  Reoperation:  5.124%  Morbidity or Mortality:  19.348%  Short Length of Stay:  34.685%  Long Length of Stay:  4.306%    Assessment/Plan:     Severe AS. Most likely a bicuspid valve. Will initiate SAVR workup, as with his age, he will need a mechanical valve. Plan for repeat heart cath/CTA chest as explained below. Will need Plavix washout. NSTEMI, in the setting of known CAD s/p stents, s/p C 11/16/22. Status of LAD stent is in question. Plan for repeat to further evaluate this. On ASA, Plavix, BB, heparin gtt. Dilated ascending aorta. Presence of potential bicuspid valve has a high association with aneurysms-CTA chest ordered to evaluate this. May need repair with AVR.   SOB. . Repeat echo still pending. HTN. On ACEI, BB PTA; currently only on BB. XOL. Continue statin. WALLY , not on CPAP. Was unable to tolerate due to anxiety; he has been working on this with Sleep Medicine. OP follow-up. DMII with hyperglycemia. On Toujeo at home. A1C from 2021 9.9, repeat ordered and pending. Currently on Lantus and SSI. May need to start insulin gtt day prior to surgery pending results. Hypothyroidism. On Synthroid PTA; continue. Repeat TSH pending. GERD. Continue PPI. Anxiety and depression. On Buspar, Zoloft; continue. Obesity. BMI 38.09%. Diet and exercise counseling. Time spent: 120 minutes      Addendum: Pt seen and examined. Images reviewed. Discussed with Dr. Akil Lobo in depth and also Dr. Dennis Morrison. Will need an open AVR as well as possibly CABGx2 depending on findings of IFR on repeat cath. In discussion with Dr. Akil Lobo, he does have prior LAD and RCA stents which appear they could have some stenosis.  He will perform the IFR Friday. The patient endorses to me signifcant symptoms of ongoing CP with minimal exertion and signficant dyspnea with mild exertion. I believe the best thing for him would be to remain in the hospital until the Plavix washes out and perform the surgery next week. He understood the risks and benefits and agreed to proceed and to remain in the hospital until then. We discussed the valve choice (mechanical vs bioprosthetic) but this will be an ongoing discussion of the pros and cons over the next several days. We have him tentatively planned for surgery Tuesday or Wednesday of next week.      Time spent 125 minutes

## 2022-11-16 NOTE — PROGRESS NOTES
Jet Salgado 36 y.o. male    Change of shift. Renae Cristina RN has received report from [facility], the off going nurse. Report has included, Demographics, Chief complaint, SBAR, kardex, most recent lab results, MAR and plan of care. All questions and concerns have been answered.

## 2022-11-16 NOTE — PROGRESS NOTES
Received notification from bedside RN about patient with regards to: recurrence of chest pain  VS: /67, HR 71, RR 18, O2 sat 95% on RA    Intervention given: Nitropaste PRN ordered

## 2022-11-16 NOTE — PROGRESS NOTES
Hospitalist Progress Note    NAME: Frida Beard   :  1980   MRN:  [id number]       Assessment / Plan:  Acute non-ST elevation myocardial infarction  History of coronary artery disease s/p multiple stents  -S/p coronary angiogram today noted to have patent LAD stent with mild to moderate diffuse disease and a jailed small diagonal branch with severe ostial narrowing  -Cath also showed evidence of severe aortic stenosis and cardiology is recommending evaluation by CT surgery and they will consider IFR of LAD/RCA  -Cardiology following and appreciate recommendations  -Continue aspirin, Plavix. Continue heparin drip. Severe aortic stenosis   -CT surgery consulted. Will follow recommendations. Dilated ascending aorta  -Noted on coronary angiogram  -CTA chest ordered  -Bicuspid aortic valve has high association with aneurysms she will need CT    Diabetes mellitus type 2 uncontrolled  -He is on insulin Toujeo 140 units daily  -We will change to Lantus 70 units twice daily per formulary. Add insulin lispro 8 units 3 times daily. Continue sliding scale insulin.  -On diabetic diet  -Will need insulin drip prior to surgery      Depression  Hypothyroidism  Hypertension  GERD  Dyslipidemia  -Continue home BuSpar, levothyroxine, metoprolol, PPI, crestor    Obstructive sleep apnea  -Not on CPAP as not able to tolerate due to anxiety     Code Status: Full code  Surrogate Decision Maker: Mother     DVT Prophylaxis: Heparin drip        Baseline: From home, independent of ADLs      YOMAIRA:   Barriers: Evaluation by CT surgery, aortic valve surgery, cath           Subjective:     Chief Complaint / Reason for Physician Visit  No further chest pain. Had cardiac cath today and noted to have severe arctic stenosis. Reports mild shortness of breath.   No nausea or vomiting  Overnight events noted      Review of Systems:  Symptom Y/N Comments  Symptom Y/N Comments   Fever/Chills    Chest Pain     Poor Appetite Edema     Cough    Abdominal Pain     Sputum    Joint Pain     SOB/COLE    Pruritis/Rash     Nausea/vomit    Tolerating PT/OT     Diarrhea    Tolerating Diet     Constipation    Other       Could NOT obtain due to:      Objective:     VITALS:   Last 24hrs VS reviewed since prior progress note. Most recent are:  Patient Vitals for the past 24 hrs:   Temp Pulse Resp BP SpO2   11/16/22 1459 -- -- -- 122/87 --   11/16/22 1430 -- -- -- 122/87 --   11/16/22 1410 -- -- -- 107/79 --   11/16/22 1255 -- -- -- 113/77 --   11/16/22 1251 -- -- -- 125/73 --   11/16/22 1200 -- 70 16 (!) 100/58 96 %   11/16/22 1133 -- 71 19 98/63 97 %   11/16/22 1115 -- 80 26 103/73 95 %   11/16/22 1058 -- 67 9 96/77 98 %   11/16/22 1052 -- 69 11 95/66 96 %   11/16/22 1043 -- 72 11 (!) 81/48 99 %   11/16/22 1028 -- 72 15 (!) 101/57 95 %   11/16/22 1013 -- 71 16 (!) 95/57 97 %   11/16/22 1002 97.5 °F (36.4 °C) 67 17 99/66 94 %   11/16/22 0700 97.8 °F (36.6 °C) 64 17 108/68 96 %   11/16/22 0300 97.6 °F (36.4 °C) 64 9 123/81 91 %   11/16/22 0154 -- 71 -- 119/79 --   11/15/22 2343 97.8 °F (36.6 °C) 71 18 109/67 95 %   11/15/22 2318 97.8 °F (36.6 °C) -- -- -- --   11/15/22 2132 -- 74 11 118/68 95 %   11/15/22 1722 97.9 °F (36.6 °C) 84 18 (!) 146/86 100 %       Intake/Output Summary (Last 24 hours) at 11/16/2022 1544  Last data filed at 11/16/2022 0410  Gross per 24 hour   Intake 70.88 ml   Output --   Net 70.88 ml        I had a face to face encounter and independently examined this patient on 11/16/2022, as outlined below:  PHYSICAL EXAM:  General: WD, WN. Alert, cooperative, no acute distress    EENT:  EOMI. Anicteric sclerae. MMM  Resp:  CTA bilaterally, no wheezing or rales. No accessory muscle use  CV:  Regular  rhythm,  No edema, systolic murmur present  GI:  Soft, Non distended, Non tender. +Bowel sounds  Neurologic:  Alert and oriented X 3, normal speech,   Psych:   Not anxious nor agitated  Skin:  No rashes.   No jaundice    Reviewed most current lab test results and cultures  YES  Reviewed most current radiology test results   YES  Review and summation of old records today    NO  Reviewed patient's current orders and MAR    YES  PMH/SH reviewed - no change compared to H&P  ________________________________________________________________________  Care Plan discussed with:    Comments   Patient y    Family      RN y    Care Manager     Consultant                        Multidiciplinary team rounds were held today with , nursing, pharmacist and clinical coordinator. Patient's plan of care was discussed; medications were reviewed and discharge planning was addressed. ________________________________________________________________________  Total NON critical care TIME:  35    Minutes    Total CRITICAL CARE TIME Spent:   Minutes non procedure based      Comments   >50% of visit spent in counseling and coordination of care     ________________________________________________________________________  Michaelle Mcintyre MD     Procedures: see electronic medical records for all procedures/Xrays and details which were not copied into this note but were reviewed prior to creation of Plan. LABS:  I reviewed today's most current labs and imaging studies.   Pertinent labs include:  Recent Labs     11/16/22  0254 11/15/22  1939 11/15/22  1734   WBC 8.6 8.3 8.7   HGB 12.4 13.2 13.2   HCT 37.6 38.8 39.7    180 179     Recent Labs     11/16/22  0254 11/15/22  1734    132*   K 4.1 4.4    98   CO2 26 31   * 297*   BUN 20 21*   CREA 1.00 1.10   CA 9.0 9.3   MG 2.0  --    ALB  --  3.8   TBILI  --  0.5   ALT  --  48       Signed: Michaelle Mcintyre MD

## 2022-11-17 ENCOUNTER — TELEPHONE (OUTPATIENT)
Dept: ENDOCRINOLOGY | Age: 42
End: 2022-11-17

## 2022-11-17 LAB
ANION GAP SERPL CALC-SCNC: 9 MMOL/L (ref 5–15)
BUN SERPL-MCNC: 17 MG/DL (ref 6–20)
BUN/CREAT SERPL: 15 (ref 12–20)
CALCIUM SERPL-MCNC: 8.8 MG/DL (ref 8.5–10.1)
CHLORIDE SERPL-SCNC: 98 MMOL/L (ref 97–108)
CO2 SERPL-SCNC: 26 MMOL/L (ref 21–32)
CREAT SERPL-MCNC: 1.11 MG/DL (ref 0.7–1.3)
ERYTHROCYTE [DISTWIDTH] IN BLOOD BY AUTOMATED COUNT: 14 % (ref 11.5–14.5)
GLUCOSE BLD STRIP.AUTO-MCNC: 135 MG/DL (ref 65–117)
GLUCOSE BLD STRIP.AUTO-MCNC: 304 MG/DL (ref 65–117)
GLUCOSE BLD STRIP.AUTO-MCNC: 330 MG/DL (ref 65–117)
GLUCOSE BLD STRIP.AUTO-MCNC: 355 MG/DL (ref 65–117)
GLUCOSE SERPL-MCNC: 352 MG/DL (ref 65–100)
HCT VFR BLD AUTO: 38.1 % (ref 36.6–50.3)
HGB BLD-MCNC: 12.7 G/DL (ref 12.1–17)
LEFT CCA DIST DIAS: 27.2 CM/S
LEFT CCA DIST SYS: 96.6 CM/S
LEFT CCA PROX DIAS: 21.7 CM/S
LEFT CCA PROX SYS: 87.5 CM/S
LEFT ECA DIAS: 10.8 CM/S
LEFT ECA SYS: 87.5 CM/S
LEFT ICA DIST DIAS: 23.6 CM/S
LEFT ICA DIST SYS: 78.4 CM/S
LEFT ICA MID DIAS: 19.9 CM/S
LEFT ICA MID SYS: 72.9 CM/S
LEFT ICA PROX DIAS: 23.6 CM/S
LEFT ICA PROX SYS: 76.5 CM/S
LEFT ICA/CCA SYS: 0.8 NO UNITS
LEFT VERTEBRAL DIAS: 10.8 CM/S
LEFT VERTEBRAL SYS: 40 CM/S
MCH RBC QN AUTO: 27.9 PG (ref 26–34)
MCHC RBC AUTO-ENTMCNC: 33.3 G/DL (ref 30–36.5)
MCV RBC AUTO: 83.6 FL (ref 80–99)
NRBC # BLD: 0 K/UL (ref 0–0.01)
NRBC BLD-RTO: 0 PER 100 WBC
PLATELET # BLD AUTO: 183 K/UL (ref 150–400)
PMV BLD AUTO: 10.3 FL (ref 8.9–12.9)
POTASSIUM SERPL-SCNC: 4.5 MMOL/L (ref 3.5–5.1)
RBC # BLD AUTO: 4.56 M/UL (ref 4.1–5.7)
RIGHT CCA DIST DIAS: 19.5 CM/S
RIGHT CCA DIST SYS: 78.8 CM/S
RIGHT CCA PROX DIAS: 19.5 CM/S
RIGHT CCA PROX SYS: 94.1 CM/S
RIGHT ECA DIAS: 0 CM/S
RIGHT ECA SYS: 81 CM/S
RIGHT ICA DIST DIAS: 26.1 CM/S
RIGHT ICA DIST SYS: 78.8 CM/S
RIGHT ICA MID DIAS: 39.3 CM/S
RIGHT ICA MID SYS: 105.1 CM/S
RIGHT ICA PROX DIAS: 39.3 CM/S
RIGHT ICA PROX SYS: 100.8 CM/S
RIGHT ICA/CCA SYS: 1.3 NO UNITS
RIGHT VERTEBRAL DIAS: 0 CM/S
RIGHT VERTEBRAL SYS: 23 CM/S
SERVICE CMNT-IMP: ABNORMAL
SODIUM SERPL-SCNC: 133 MMOL/L (ref 136–145)
VAS LEFT SUBCLAVIAN PROX EDV: 0 CM/S
VAS LEFT SUBCLAVIAN PROX PSV: 100.3 CM/S
VAS RIGHT SUBCLAVIAN PROX EDV: 0 CM/S
VAS RIGHT SUBCLAVIAN PROX PSV: 142.5 CM/S
WBC # BLD AUTO: 7.2 K/UL (ref 4.1–11.1)

## 2022-11-17 PROCEDURE — 85520 HEPARIN ASSAY: CPT

## 2022-11-17 PROCEDURE — 74011250637 HC RX REV CODE- 250/637: Performed by: INTERNAL MEDICINE

## 2022-11-17 PROCEDURE — 74011000250 HC RX REV CODE- 250: Performed by: INTERNAL MEDICINE

## 2022-11-17 PROCEDURE — 36415 COLL VENOUS BLD VENIPUNCTURE: CPT

## 2022-11-17 PROCEDURE — 74011250637 HC RX REV CODE- 250/637: Performed by: NURSE PRACTITIONER

## 2022-11-17 PROCEDURE — 99233 SBSQ HOSP IP/OBS HIGH 50: CPT | Performed by: THORACIC SURGERY (CARDIOTHORACIC VASCULAR SURGERY)

## 2022-11-17 PROCEDURE — 74011250636 HC RX REV CODE- 250/636: Performed by: INTERNAL MEDICINE

## 2022-11-17 PROCEDURE — 80048 BASIC METABOLIC PNL TOTAL CA: CPT

## 2022-11-17 PROCEDURE — 74011636637 HC RX REV CODE- 636/637: Performed by: INTERNAL MEDICINE

## 2022-11-17 PROCEDURE — 82962 GLUCOSE BLOOD TEST: CPT

## 2022-11-17 PROCEDURE — 85027 COMPLETE CBC AUTOMATED: CPT

## 2022-11-17 PROCEDURE — 65270000029 HC RM PRIVATE

## 2022-11-17 PROCEDURE — 93005 ELECTROCARDIOGRAM TRACING: CPT

## 2022-11-17 RX ORDER — INSULIN LISPRO 100 [IU]/ML
30 INJECTION, SOLUTION INTRAVENOUS; SUBCUTANEOUS
Status: DISCONTINUED | OUTPATIENT
Start: 2022-11-17 | End: 2022-11-19

## 2022-11-17 RX ADMIN — METOPROLOL SUCCINATE 25 MG: 25 TABLET, FILM COATED, EXTENDED RELEASE ORAL at 17:22

## 2022-11-17 RX ADMIN — BUSPIRONE HYDROCHLORIDE 15 MG: 5 TABLET ORAL at 07:47

## 2022-11-17 RX ADMIN — LEVOTHYROXINE SODIUM 125 MCG: 0.07 TABLET ORAL at 07:47

## 2022-11-17 RX ADMIN — BUSPIRONE HYDROCHLORIDE 15 MG: 5 TABLET ORAL at 21:18

## 2022-11-17 RX ADMIN — SODIUM CHLORIDE, PRESERVATIVE FREE 10 ML: 5 INJECTION INTRAVENOUS at 13:40

## 2022-11-17 RX ADMIN — LORAZEPAM 1 MG: 1 TABLET ORAL at 21:18

## 2022-11-17 RX ADMIN — INSULIN GLARGINE 70 UNITS: 100 INJECTION, SOLUTION SUBCUTANEOUS at 21:18

## 2022-11-17 RX ADMIN — SERTRALINE 100 MG: 50 TABLET, FILM COATED ORAL at 08:18

## 2022-11-17 RX ADMIN — ROSUVASTATIN CALCIUM 40 MG: 40 TABLET, FILM COATED ORAL at 08:18

## 2022-11-17 RX ADMIN — Medication 10 UNITS: at 12:06

## 2022-11-17 RX ADMIN — BUSPIRONE HYDROCHLORIDE 15 MG: 5 TABLET ORAL at 13:38

## 2022-11-17 RX ADMIN — Medication 30 UNITS: at 12:05

## 2022-11-17 RX ADMIN — PANTOPRAZOLE SODIUM 40 MG: 40 TABLET, DELAYED RELEASE ORAL at 07:47

## 2022-11-17 RX ADMIN — INSULIN GLARGINE 70 UNITS: 100 INJECTION, SOLUTION SUBCUTANEOUS at 08:18

## 2022-11-17 RX ADMIN — ASPIRIN 81 MG: 81 TABLET, COATED ORAL at 08:17

## 2022-11-17 RX ADMIN — METOPROLOL SUCCINATE 25 MG: 25 TABLET, FILM COATED, EXTENDED RELEASE ORAL at 08:17

## 2022-11-17 RX ADMIN — Medication 10 UNITS: at 21:18

## 2022-11-17 RX ADMIN — NITROGLYCERIN 1 INCH: 20 OINTMENT TOPICAL at 15:53

## 2022-11-17 RX ADMIN — Medication 10 UNITS: at 07:50

## 2022-11-17 RX ADMIN — Medication 8 UNITS: at 07:50

## 2022-11-17 RX ADMIN — SODIUM CHLORIDE, PRESERVATIVE FREE 10 ML: 5 INJECTION INTRAVENOUS at 21:27

## 2022-11-17 RX ADMIN — LORAZEPAM 1 MG: 1 TABLET ORAL at 15:56

## 2022-11-17 NOTE — PROGRESS NOTES
Spiritual Care Assessment/Progress Note  [facility]      NAME: Dianne Nuno      MRN: [id number]  AGE: 36 y.o. SEX: male  Congregational Affiliation: Scientology   Language: English     11/17/2022     Total Time (in minutes): 35     Spiritual Assessment begun in [facility]ARD[facility] through conversation with:         [x]Patient        [x] Family    [] Friend(s)        Reason for Consult: Request by staff     Spiritual beliefs: (Please include comment if needed)     [x] Identifies with a atul tradition:         [] Supported by a atul community:            [] Claims no spiritual orientation:           [] Seeking spiritual identity:                [] Adheres to an individual form of spirituality:           [] Not able to assess:                           Identified resources for coping:      [x] Prayer                               [] Music                  [] Guided Imagery     [x] Family/friends                 [] Pet visits     [] Devotional reading                         [] Unknown     [] Other:                                                Interventions offered during this visit: (See comments for more details)    Patient Interventions: Affirmation of emotions/emotional suffering, Initial/Spiritual assessment, patient floor, Life review/legacy, Normalization of emotional/spiritual concerns, Prayer (actual), Integration of medical assessment with existing values and beliefs, Affirmation of atul, Catharsis/review of pertinent events in supportive environment, Iconic (affirming the presence of God/Higher Power), Congregational beliefs/image of God discussed     Family/Friend(s):  Affirmation of emotions/emotional suffering, Iconic (affirming the presence of God/Higher Power), Life review/legacy, Integration of medical assessment with existing values and beliefs, Prayer (actual), Normalization of emotional/spiritual concerns, Congregational beliefs/image of God discussed     Plan of Care:     [] Support spiritual and/or cultural needs    [] Support AMD and/or advance care planning process      [] Support grieving process   [] Coordinate Rites and/or Rituals    [] Coordination with community clergy   [] No spiritual needs identified at this time   [] Detailed Plan of Care below (See Comments)  [] Make referral to Music Therapy  [] Make referral to Pet Therapy     [] Make referral to Addiction services  [] Make referral to [facility]  [] Make referral to Spiritual Care Partner  [] No future visits requested        [x] Contact Spiritual Care for further referrals     Comments:  Patient's nurse left a  on spiritual care office phone, requesting for pastoral support for Mr. Cervantes in [phone]. Patient had expressed to nurse that he was concerned about his upcoming procedure. Patient was in his recliner, and his mother was present when  visited. They both received visit kindly and affirmed they accepted nurse's offer to call a  for support. Patient shared at length about his medical condition, which started in 2019. He stated that he laid in bed the whole night thinking about his upcoming procedure and wondered what would happen if he dies in the process. He has an 25year old son, his only child, whom he loves dearly. He himself is an only child as well, mom stated, and all they have is each other.  listened with empathy and affirmation. Exploring coping resources, patient shared that he believes in God and his word, though he is not very Restoration. He shared that he has been talking to him a lot.  spoke calm words to encourage focus on the present, patient's mom requested for prayer, which  offered. They seemed encouraged and thanked  for the visit and prayer. Spiritual care is still available for further assistance.        Visited by: Jayesh kohli : [phone]  ([phone])

## 2022-11-17 NOTE — PROGRESS NOTES
CM reviewed chart. Pt lives at home with his mother. Pt's significant other will transport at d/c. Pt has an YOMAIRA of 11/22/22. CM will continue to follow.      JOSE MIGUEL West  Care Management, [place]

## 2022-11-17 NOTE — CARDIO/PULMONARY
Cardiac Rehab Note: chart review/referral     BUARK SALDANA  Cardiac cath 11/16/22:  \"Brief procedure Result: Patent LAD stent with mild to moderate diffuse disease   Jailed small diagonal branch with severe ostial narrowing   Mild RCA   Severe aortic stenosis with mean gradient 50.50 mm Hg   Elevated LVEDP   Dilated ascending aorta      Recommendations: Severe AS - with mean gradient 50.50, also dilated ascending aorta by LV gram   Needs Echo   If Confirmed severe AS (could be bicuspid valve) then consider for surgery then IFR of LAD/ RCA should be considered to eval for hemodynamically significant   Discussed with Dr Amrit Gomez, D/w Dr Aundrea Mims \"      Smoking history assessed. Patient is a former smoker. Smoking Cessation Program link has not been added to the AVS.     Patient not seen at this time due to current condition as indicated in chart. CP Rehab will see as indicated.

## 2022-11-17 NOTE — PROGRESS NOTES
CALLED FACILITY, STILL PENDING INSURANCE CSS FLOOR Progress Note    Admit Date: 11/15/2022  POD: 1 Day Post-Op      Procedure:  Procedure(s):  LEFT HEART CATH / CORONARY ANGIOGRAPHY    Subjective/overnight events:   Pt seen with Dr. Kacey Marlow, up in the chair. In NSR, on RA, afebrile . VSS. No events overnight. He is experiencing some anxiety r/t planned procedure. He is open to a visit with the [facility]; nursing has been notified. Objective:     Visit Vitals  /83   Pulse 85   Temp 97.8 °F (36.6 °C)   Resp 16   Ht 5' 9\" (1.753 m)   Wt 257 lb 15 oz (117 kg)   SpO2 98%   BMI 38.09 kg/m²     Temp (24hrs), Av.8 °F (36.6 °C), Min:97.5 °F (36.4 °C), Max:98 °F (36.7 °C)      Last 24hr Input/Output:  No intake or output data in the 24 hours ending 22 0750     Chest tube output: N/A    EKG/Rhythm:   EKG Results       Procedure 720 Value Units Date/Time    EKG, 12 LEAD, INITIAL [[id number]] Collected: 11/15/22 1728    Order Status: Completed Updated: 22 1136     Ventricular Rate 83 BPM      Atrial Rate 83 BPM      P-R Interval 160 ms      QRS Duration 84 ms      Q-T Interval 358 ms      QTC Calculation (Bezet) 420 ms      Calculated P Axis 50 degrees      Calculated R Axis 3 degrees      Calculated T Axis 126 degrees      Diagnosis --     Normal sinus rhythm  Left ventricular hypertrophy with repolarization abnormality  When compared with ECG of 14-SEP-2022 12:27,  No significant change was found  Confirmed by Mitali Chandler ([id number]) on 2022 11:35:53 AM              Oxygen: RA    CXR:   CXR Results  (Last 48 hours)                 11/15/22 1745  XR CHEST PORT Final result    Impression:      Mild interstitial pulmonary edema versus interstitial pneumonia. No   pneumothorax. Consider PA and lateral chest views when the patient can better   tolerate. Narrative:  EXAM: XR CHEST PORT       INDICATION: Chest pain. COMPARISON: CT chest on 12/15/2021. Portable chest on 2022. Chest views on   2022.        TECHNIQUE: Portable chest AP view       FINDINGS: The cardiomediastinal and hilar contours are within normal limits. Coronary calcific atherosclerosis is visible. Aorta is not enlarged. Pulmonary   vasculature is within normal limits. Reticular interstitial opacities are more central than lateral and new. No focal   airspace opacity. No pneumothorax. The visualized bones and upper abdomen are   age-appropriate. CT Results  (Last 48 hours)                 11/16/22 1544  CTA CHEST W OR W WO CONT Final result    Impression:  1. Ectasia of the ascending aorta which measures up to 3.7 cm.   2. Incidental findings as above. Narrative:  EXAM:  CTA CHEST W OR W WO CONT   INDICATION:  Eval aorta. Additional history:   COMPARISON: CT of the chest, 12/15/2021. .   TECHNIQUE:    Precontrast  images were obtained to localize the volume for acquisition. Multislice helical CT arteriography was performed from the diaphragm to the   thoracic inlet during uneventful rapid bolus intravenous contrast   administration. Lung and soft tissue windows were generated. Coronal and   sagittal images were generated and 3D post processing consisting of coronal   maximum intensity images was performed. CT dose reduction was achieved through use of a standardized protocol tailored   for this examination and automatic exposure control for dose modulation. Tracey Sales FINDINGS:   CHEST:   Chest wall/thoracic inlet: Within normal limits. Thyroid: Within normal limits. Mediastinum/ye: Within normal limits. Heart/vessels: Calcifications about the aortic valve. Calcification/stents in   the coronary arteries. Ectasia of the ascending aorta which measures up to 3.7   cm   Lungs/Pleura: Within normal limits. .   INCIDENTALLY IMAGED ABDOMEN:   Incidentally imaged portions of the abdomen appear unremarkable. .   MSK:    Within normal limits.    .             11/15/22    ECHO ADULT COMPLETE 11/16/2022 11/16/2022    Interpretation Summary    Left Ventricle: Low normal left ventricular systolic function with a visually estimated EF of 50 - 55%. Left ventricle size is normal. Increased wall thickness. Normal wall motion. Aortic Valve: Not well visualized. Possible bicuspid aortic valve. Thickened cusp. Calcified cusp with restricted leaflet motion. Moderate to Severe stenosis of the aortic valve. Mean gradient 39.9 mm Hg. Peak veolocity 4.05 m/sec. Technical qualifiers: Echo study was technically difficult due to patient's body habitus. Signed by: Mikayla Mendez MD on 11/16/2022  4:16 PM    11/15/22    CARDIAC PROCEDURE 11/16/2022 11/16/2022    Conclusion  Cardiac Cathetherization Note    PreOp Diagnosis / Indication:  Chest pain, sob. History of PCI to LAD, LCx and RCA    Procedure Performed:  1 LHC, Cors, Cineflouroscopy  2 AV study  3 LV gram      I have explained the nature of cardiac catheterization and possible percutaneous coronary intervention including risks and benefits of the procedure with the patient which include at least a 1:1000 risk for diagnostic procedure and 1/100 risk for percutaneous intervention. Risks include but are not limited to risk of heart attack, stroke, vascular trauma requiring surgical repair or transfusion, abnormal heart rhythm requiring defibrillation or pacemaker, need for intraaortic balloon pump support, renal dysfunction requiring dialysis, exacerbated gastrointestinal bleeding, allergic response to medications requiring ventilatory support, emergent cardiac surgery and even death. They also understand the need for medical compliance - particularly if stenting is required - mandating continued daily consumption of aspirin and plavix or other antiplatelet therapy. Differences between medicated stent versus bare metal stent reviewed with patient. The patient expresses an understanding and verbally consents.  They also understand plans for either radial or femoral access - with unique risks to both vascular beds including arterial occlusion, vascular trauma, hematoma and need for vascular surgery. I have answered all of their questions regarding the procedure and they are willing to proceed. Procedure status: [x]  Elective  [] Urgent  [] Emergent  Operators:  Raghu Wallace  Assistants: None  Access:   [x]  RIGHT Radial  []  LEFT Radial  [] RCFA  []  LCFA  []  RCFV  []  LCFV  Catheters: TIG, JR  Closure: [x]  TR Band  []  Angioseal  []  Perclose  []  Manual Compression  Tubes/Drains:  [x] No tubes or drains remain from this procedure  [] Other:  Estimated Blood Loss: Minimal  Specimens: None  Sedation: Moderate conscious sedation with IV fentany & versed, local anesthesia with 1% lidocaine. This was performed by non-anesthesia personnel and I provided direct supervision to a trained independent observer. Time under moderate sedation: 1hr 10  Min  Patient age:  36 y.o. Complications:  [x] None  [] Other:  Patient Condition at the end of the procedure:  [x] Stable  [] Other:    Hemodynamics: Ao: 111/74  LV:  163/17, EDP 30, There was 50-60 mm Hg gradient during pull back    Simultaneous pressure measurement of LV and Ao performed  Mean gradient across Aortic valve was 50.5 mm Hg suggesting severe AS. Cors:    Dominance: [x] Right  [] Left  [] Mixed    LM: Very short. No significant disease. LAD: Large caliber vessel that wraps around the apex which has patent stent, 30% proximal narrowing, with up 40-50% narrowing in proximal segment. D1: Small to moderate caliber vessel with 90% ostial narrowing, jailed from LAD stent. LCX: Large caliber vessel without significant stenosis, patent LCx to OM stent. OM1: Moderate caliber vessel with patent stent in mid segment and diffuse disease distally. 60-70% narrowing in distal vessel. RCA: Moderate vessel with patent stent and 40-50% narrowing in mid segment.   PDA: Moderate caliber vessel without significant stenosis. PLB: Small caliber vessel without significant stenosis. LV angiography:  EF: 50-55%  Wall motion:  NA  MR: None minimal      Findings/PostOp Diagnosis:  1. Mild to moderate disease in major epicardial artery  2. Severe ostial disease of diagonal branch (jailed from LAD stent)  3. Severe aortic stenosis with mean gradient 51 mm Hg (by simultaneous pressure measurement)  4. Mildly dilated ascending aorta  5. Elevated left sided filling pressures    Recommendations:  1. Echocardiogram to assess aortic stenosis and AV morphology  2. Routine post procedure & access site care  3. Continue aggressive medical management and risk factor modification    Clara Hastings MD    Signed by: Clara Hastings MD on 11/16/2022  4:54 PM        PFTs (11/16/22):      Carotid US:   11/16/22- preliminary report: There is mild stenosis in the right ICA (<50%). There is mild stenosis in the left ICA (<50%). The right vertebral is antegrade. The left vertebral is antegrade.       Admission Weight: Last Weight   Weight: 257 lb 15 oz (117 kg) Weight: 257 lb 15 oz (117 kg)       EXAM:  General: Awake, alert, oriented  and no acute distress   Lungs:    Diminished to ausculation bilaterally   Incision:  N/A   Heart:   grade II systolic ejection murmur right of the sternal border, 2nd ICS   Abdomen:    Soft, non-distended, non-tender and obese   Extremities:  Non-pitting edema BLE   Neurologic:  Gross motor and sensory apparatus intact         Activity: OOB TID, ambulate     Diet:  Cardiac    Lab Data Reviewed:   Recent Labs     11/17/22  0646 11/17/22  0319   WBC  --  7.2   HGB  --  12.7   HCT  --  38.1   PLT  --  183   NA  --  133*   K  --  4.5   BUN  --  17   CREA  --  1.11   GLU  --  352*   GLUCPOC 330*  --          Assessment:     Active Problems:    Acute non-Q wave non-ST elevation myocardial infarction (NSTEMI) ([facility] [address] (11/15/2022)             Plan/Recommendations/Medical Decision Making:     Severe symptomatic AS, most likely a bicuspid valve. SAVR  workup in process including possible repeat heart cath tomorrow. Discussed with Dr. Amrit Gomez- Dr. Aundrea Mims considering tissue valve instead of mechanical, possibly early next week; final plan TBD by Dr. Aundrea Mims. Plavix placed on hold. NSTEMI, in the setting of known CAD s/p stents, s/p [facility] 11/16/22. Status of LAD stent is in question. Plan for repeat to further evaluate this, possibly tomorrow per Dr. Akil Lobo. On ASA, Plavix, BB, heparin gtt. Dilated ascending aorta. No aneurysm noted on CT as above. SOB in the setting of AS. . Repeat echo and PFT results as above. Plan for SAVR as above. HTN. On ACEI, BB PTA; currently only on BB. XOL. Continue statin. WALLY , not on CPAP. Was unable to tolerate due to anxiety; he has been working on this with Sleep Medicine. OP follow-up. DMII with hyperglycemia. On Toujeo at home. Currently on Lantus and SSI per primary team. Repeat A1C 9.0- will need to start insulin gtt day prior to surgery. Hypothyroidism. On Synthroid PTA; continue. Repeat TSH 1.96. GERD. Continue PPI. Anxiety and depression. On Buspar, Zoloft; continue. Supportive care. Obesity. BMI 38.09%. Diet and exercise counseling. Time spent: 30 minutes     DVT ppx- Heparin gtt  Dispo- Remain on stepdown        Signed By: Eber Salguero NP    Addendum: Pt seen and examined. Agree with LATASHA Berger note. He is a good candidate for surgery. Discussed prosthesis choice further with Dr. Amrit Gomez, and again with the patient. He is open to either valve choice, and understands the pros and cons of each. We decided to let him think it through and will discuss more tomorrow. He is scheduled for repeat [facility] tmrw with IFR of his stents.      Time spent: 35 minutes

## 2022-11-17 NOTE — PROGRESS NOTES
Hospitalist Progress Note    NAME: Bean Hazel   :  1980   MRN:  [id number]       Assessment / Plan:  Acute non-ST elevation myocardial infarction  History of coronary artery disease s/p multiple stents  -S/p coronary angiogram today noted to have patent LAD stent with mild to moderate diffuse disease and a jailed small diagonal branch with severe ostial narrowing  -Cath also showed evidence of severe aortic stenosis   -CT surgery will consider tissue aortic valve replacement early next week  -Cardiology is planning on coronary angiogram with IFR of LAD/RCA  -Continue aspirin. Holding Plavix due to anticipated surgery. Continue heparin drip per cardiology  -Appreciate recommendations from cardiology and CT surgery    Severe aortic stenosis  Shortness of breath due to severe aortic stenosis  -Possible tissue valve replacement early next week    Dilated ascending aorta  -Noted on coronary angiogram  -CTA shows ectasia of the aorta at 3.7 cm but no aneurysm    Diabetes mellitus type 2 uncontrolled  -He is on insulin Toujeo 140 units daily  -Continue Lantus 70 units twice daily  -Increase lispro to 30 units twice daily  -He is on 36 units of insulin lispro 3 times daily at home  -Continue sliding scale insulin as well  -Continue diabetic diet  -Will need insulin drip at the time of surgery for effective blood sugar management        Depression  Hypothyroidism  Hypertension  GERD  Dyslipidemia  -Continue home BuSpar, levothyroxine, metoprolol, PPI, crestor    Obstructive sleep apnea  -Not on CPAP as not able to tolerate due to anxiety     Code Status: Full code  Surrogate Decision Maker: Mother     DVT Prophylaxis: Heparin drip        Baseline: From home, independent of ADLs      YOMAIRA:   Barriers: Evaluation by CT surgery, aortic valve surgery, cath           Subjective:     Chief Complaint / Reason for Physician Visit  Reports some anxiety due to upcoming procedures. Chest pain is improved.   Still with some shortness of breath mostly on exertion. No cough or phlegm  Blood sugar still high  Overnight events noted and discussed with nursing        Review of Systems:  Symptom Y/N Comments  Symptom Y/N Comments   Fever/Chills    Chest Pain     Poor Appetite    Edema     Cough    Abdominal Pain     Sputum    Joint Pain     SOB/COLE    Pruritis/Rash     Nausea/vomit    Tolerating PT/OT     Diarrhea    Tolerating Diet     Constipation    Other       Could NOT obtain due to:      Objective:     VITALS:   Last 24hrs VS reviewed since prior progress note. Most recent are:  Patient Vitals for the past 24 hrs:   Temp Pulse Resp BP SpO2   11/17/22 0745 96.8 °F (36 °C) 85 14 130/83 98 %   11/17/22 0329 97.8 °F (36.6 °C) 91 16 130/62 98 %   11/16/22 2254 98 °F (36.7 °C) 83 14 (!) 105/54 95 %   11/16/22 1912 97.7 °F (36.5 °C) 76 14 105/67 98 %   11/16/22 1830 -- 78 -- 120/78 --   11/16/22 1800 -- 75 -- 124/72 --   11/16/22 1731 -- 72 -- 126/74 --   11/16/22 1725 -- 76 -- 105/74 --   11/16/22 1645 -- 79 -- (!) 152/58 --   11/16/22 1459 -- -- -- 122/87 --   11/16/22 1430 -- -- -- 122/87 --   11/16/22 1410 -- -- -- 107/79 --   11/16/22 1255 -- -- -- 113/77 --   11/16/22 1251 -- -- -- 125/73 --       No intake or output data in the 24 hours ending 11/17/22 1213       I had a face to face encounter and independently examined this patient on 11/17/2022, as outlined below:  PHYSICAL EXAM:  General: WD, WN. Alert, cooperative, no acute distress    EENT:  EOMI. Anicteric sclerae. MMM  Resp:  CTA bilaterally, no wheezing or rales. No accessory muscle use  CV:  Regular  rhythm,  No edema, systolic murmur present  GI:  Soft, Non distended, Non tender. +Bowel sounds  Neurologic:  Alert and oriented X 3, normal speech,   Psych:   Not anxious nor agitated  Skin:  No rashes.   No jaundice    Reviewed most current lab test results and cultures  YES  Reviewed most current radiology test results   YES  Review and summation of old records today    NO  Reviewed patient's current orders and MAR    YES  PMH/SH reviewed - no change compared to H&P  ________________________________________________________________________  Care Plan discussed with:    Comments   Patient y    Family      RN y    Care Manager     Consultant                        Multidiciplinary team rounds were held today with , nursing, pharmacist and clinical coordinator. Patient's plan of care was discussed; medications were reviewed and discharge planning was addressed. ________________________________________________________________________  Total NON critical care TIME:  35    Minutes    Total CRITICAL CARE TIME Spent:   Minutes non procedure based      Comments   >50% of visit spent in counseling and coordination of care     ________________________________________________________________________  Jennie Stanley MD     Procedures: see electronic medical records for all procedures/Xrays and details which were not copied into this note but were reviewed prior to creation of Plan. LABS:  I reviewed today's most current labs and imaging studies.   Pertinent labs include:  Recent Labs     11/17/22  0319 11/16/22  0254 11/15/22  1939   WBC 7.2 8.6 8.3   HGB 12.7 12.4 13.2   HCT 38.1 37.6 38.8    164 180       Recent Labs     11/17/22  0319 11/16/22  0254 11/15/22  1734   * 136 132*   K 4.5 4.1 4.4   CL 98 102 98   CO2 26 26 31   * 207* 297*   BUN 17 20 21*   CREA 1.11 1.00 1.10   CA 8.8 9.0 9.3   MG  --  2.0  --    ALB  --   --  3.8   TBILI  --   --  0.5   ALT  --   --  48         Signed: Jennie Stanley MD

## 2022-11-17 NOTE — PROGRESS NOTES
Cardiology Progress Note  11/17/2022 8:36 AM  Admit Date: 11/15/2022  Admit Diagnosis/CC: Acute non-Q wave non-ST elevation myocardial infarction (NSTEMI) ([facility] [address] [I21.4]  Subjective:   Patient reports:  Chest Pain:  [x] none,  consistent with [] non-cardiac  [] atypical  []  anginal chest pain             [x] none now    []  on-going  Dyspnea: [x] none  [] at rest  [] with exertion  [] improved  [] unchanged [] worsening  PND:       [x] none  [] overnight   [] current  Orthopnea: [x] none  [] improved  [] unchanged  [] worsening  Presyncope: [x] none  [] improved  [] unchanged  [] worsening  Ambulated in hallway without symptoms  [] Yes  Ambulated in room without symptoms  [x] Yes  ROS(2+other systems)   Hematuria: [] Yes  [x] No.   Dysuria: [] Yes  [x] No                                           Cough:       [] Yes  [x] No.   Sputum: [] Yes  [x] No                                            Hematochezia: [] Yes  [x] No.   Melena: [] Yes  [x] No                                            No change in family and social history from H&P/Consult note.     Current Facility-Administered Medications   Medication Dose Route Frequency    insulin lispro (HUMALOG) injection 30 Units  30 Units SubCUTAneous TIDAC    nitroglycerin (NITROBID) 2 % ointment 1 Inch  1 Inch Topical Q6H PRN    insulin glargine (LANTUS) injection 70 Units  70 Units SubCUTAneous DAILY    And    insulin glargine (LANTUS) injection 70 Units  70 Units SubCUTAneous QHS    heparin 25,000 units in D5W 250 ml infusion  8-25 Units/kg/hr IntraVENous TITRATE    heparin (porcine) 1,000 unit/mL injection 2,000 Units  2,000 Units IntraVENous PRN    Or    heparin (porcine) 1,000 unit/mL injection 4,000 Units  4,000 Units IntraVENous PRN    aspirin delayed-release tablet 81 mg  81 mg Oral DAILY    [Held by provider] clopidogreL (PLAVIX) tablet 75 mg  75 mg Oral DAILY    pantoprazole (PROTONIX) tablet 40 mg  40 mg Oral ACB    levothyroxine (SYNTHROID) tablet 125 mcg 125 mcg Oral ACB    LORazepam (ATIVAN) tablet 1 mg  1 mg Oral Q8H PRN    metoprolol succinate (TOPROL-XL) XL tablet 25 mg  25 mg Oral BID    rosuvastatin (CRESTOR) tablet 40 mg  40 mg Oral DAILY    sertraline (ZOLOFT) tablet 100 mg  100 mg Oral DAILY    sodium chloride (NS) flush 5-40 mL  5-40 mL IntraVENous Q8H    sodium chloride (NS) flush 5-40 mL  5-40 mL IntraVENous PRN    acetaminophen (TYLENOL) tablet 650 mg  650 mg Oral Q6H PRN    Or    acetaminophen (TYLENOL) suppository 650 mg  650 mg Rectal Q6H PRN    polyethylene glycol (MIRALAX) packet 17 g  17 g Oral DAILY PRN    ondansetron (ZOFRAN ODT) tablet 4 mg  4 mg Oral Q8H PRN    Or    ondansetron (ZOFRAN) injection 4 mg  4 mg IntraVENous Q6H PRN    insulin lispro (HUMALOG) injection   SubCUTAneous AC&HS    glucose chewable tablet 16 g  4 Tablet Oral PRN    glucagon (GLUCAGEN) injection 1 mg  1 mg IntraMUSCular PRN    dextrose 10% infusion 0-250 mL  0-250 mL IntraVENous PRN    busPIRone (BUSPAR) tablet 15 mg  15 mg Oral TID       Objective:    Physical Exam:  Last VS: Visit Vitals  /83 (BP 1 Location: Left upper arm, BP Patient Position: Sitting)   Pulse 85   Temp 96.8 °F (36 °C)   Resp 14   Ht 5' 9\" (1.753 m)   Wt 117 kg (257 lb 15 oz)   SpO2 98%   BMI 38.09 kg/m²    Temp (24hrs), Av.6 °F (36.4 °C), Min:96.8 °F (36 °C), Max:98 °F (36.7 °C)    24 hr VS reviewed. General Appearance:   [x] well developed, well nourished,  [x] NAD. [] agitated   [] lethargic but arousable  [] obtunded   ENT, Palate:    [x] WNL   [] dry palate/MM        Respiratory:    [x] CTA bilateral  [] rales  [] rhonchi  [] normal resp effort      [] similar to yesterday   [] worse    [] improved   Cardiovascular:   [x] RRR   [] Irregular rate and rhythm   [x] Normal S1, S2   [x] No gallop or rub.    [] no murmur   [x] no new murmur  [] murmur c/w:   [x] no edema  RLE: []1+ []2+ [] 3+;  LLE: []1+ []2+ []3+      [] edema similar to yesterday   [] worse    [] improved   [x] normal JVP   [] Elevated JVP    [x] JVP similar to yesterday   [] worse    [] improved   [x] carotid upstroke unchanged   [x] abd aorta not palpated   [x] no stigmata of peripheral emboli   GI:    [x] abd soft, non-distented,bowel sounds present, no                     organomegaly appreciated   Skin:  Neuro:    Cath Site:  [x] warm and dry [] cold extremities   [x] A/O x 3, grossly non-focal      [] intact w/o hematoma or bruit; distal pulse unchanged.               Data Review:   Labs:    Recent Results (from the past 24 hour(s))   GLUCOSE, POC    Collection Time: 11/16/22 10:07 AM   Result Value Ref Range    Glucose (POC) 227 (H) 65 - 117 mg/dL    Performed by Aniket Regalado RN    GLUCOSE, POC    Collection Time: 11/16/22 11:21 AM   Result Value Ref Range    Glucose (POC) 298 (H) 65 - 117 mg/dL    Performed by Makayla Chinchilla RN    TSH 3RD GENERATION    Collection Time: 11/16/22 12:27 PM   Result Value Ref Range    TSH 1.96 0.36 - 3.74 uIU/mL   HEMOGLOBIN A1C WITH EAG    Collection Time: 11/16/22 12:27 PM   Result Value Ref Range    Hemoglobin A1c 9.0 (H) 4.0 - 5.6 %    Est. average glucose 212 mg/dL   URINALYSIS W/ REFLEX CULTURE    Collection Time: 11/16/22 12:27 PM    Specimen: Urine   Result Value Ref Range    Color YELLOW/STRAW      Appearance CLEAR CLEAR      Specific gravity 1.010 1.003 - 1.030      pH (UA) 6.0 5.0 - 8.0      Protein Negative NEG mg/dL    Glucose >1,000 (A) NEG mg/dL    Ketone Negative NEG mg/dL    Bilirubin Negative NEG      Blood Negative NEG      Urobilinogen 0.2 0.2 - 1.0 EU/dL    Nitrites Negative NEG      Leukocyte Esterase Negative NEG      UA:UC IF INDICATED CULTURE NOT INDICATED BY UA RESULT      WBC 0-4 0 - 4 /hpf    RBC 0-5 0 - 5 /hpf    Epithelial cells FEW FEW /lpf    Bacteria Negative NEG /hpf    Hyaline cast 0-2 0 - 2 /lpf   SAMPLES BEING HELD    Collection Time: 11/16/22 12:27 PM   Result Value Ref Range    SAMPLES BEING HELD PST     COMMENT        Add-on orders for these samples will be processed based on acceptable specimen integrity and analyte stability, which may vary by analyte.    GLUCOSE, POC    Collection Time: 11/16/22  1:37 PM   Result Value Ref Range    Glucose (POC) 370 (H) 65 - 117 mg/dL    Performed by Edward Henriquez    DUPLEX CAROTID BILATERAL    Collection Time: 11/16/22  2:26 PM   Result Value Ref Range    Right subclavian prox .5 cm/s    Right subclavian prox EDV 0.0 cm/s    Right cca dist sys 78.8 cm/s    Right CCA dist ayers 19.5 cm/s    Right CCA prox sys 94.1 cm/s    Right CCA prox ayers 19.5 cm/s    Right ICA dist sys 78.8 cm/s    Right ICA dist ayers 26.1 cm/s    Right ICA mid sys 105.1 cm/s    Right ICA mid ayers 39.3 cm/s    Right ICA prox sys 100.8 cm/s    Right ICA prox ayers 39.3 cm/s    Right eca sys 81.0 cm/s    RIGHT EXTERNAL CAROTID ARTERY D 0.00 cm/s    Right vertebral sys 23.0 cm/s    RIGHT VERTEBRAL ARTERY D 0.00 cm/s    Right ICA/CCA sys 1.3 no units    Left subclavian prox .3 cm/s    Left subclavian prox EDV 0.0 cm/s    Left CCA dist sys 96.6 cm/s    Left CCA dist ayers 27.2 cm/s    Left CCA prox sys 87.5 cm/s    Left CCA prox ayers 21.7 cm/s    Left ICA dist sys 78.4 cm/s    Left ICA dist ayers 23.6 cm/s    Left ICA mid sys 72.9 cm/s    Left ICA mid ayers 19.9 cm/s    Left ICA prox sys 76.5 cm/s    Left ICA prox ayers 23.6 cm/s    Left ECA sys 87.5 cm/s    LEFT EXTERNAL CAROTID ARTERY D 10.80 cm/s    Left vertebral sys 40.0 cm/s    LEFT VERTEBRAL ARTERY D 10.80 cm/s    Left ICA/CCA sys 0.80 no units   ECHO ADULT COMPLETE    Collection Time: 11/16/22  3:23 PM   Result Value Ref Range    IVSd 1.1 (A) 0.6 - 1.0 cm    LVIDd 4.8 4.2 - 5.9 cm    LVIDs 3.8 cm    LVOT Diameter 2.2 cm    LVPWd 1.3 (A) 0.6 - 1.0 cm    LV Ejection Fraction A4C 55 %    LV EDV A4C 100 mL    LV ESV A4C 45 mL    LVOT Peak Gradient 5 mmHg    LVOT Mean Gradient 3 mmHg    LVOT .8 ml    LVOT Peak Velocity 1.1 m/s    LVOT VTI 28.1 cm    RV Free Wall Peak S' 13 cm/s    LA Diameter 3.5 cm    LA Volume 4C 35 18 - 58 mL    AV Area by Peak Velocity 1.0 cm2    AV Area by VTI 1.2 cm2    AV Peak Gradient 63 mmHg    AV Mean Gradient 37 mmHg    AV Peak Velocity 4.0 m/s    AV Mean Velocity 2.9 m/s    AV VTI 85.9 cm    MV A Velocity 0.98 m/s    MV E Wave Deceleration Time 164.3 ms    MV E Velocity 0.95 m/s    LV E' Lateral Velocity 9 cm/s    LV E' Septal Velocity 5 cm/s    MV PHT 47.8 ms    MV Area by PHT 4.6 cm2    MV Area by VTI 3.3 cm2    MV Peak Gradient 4 mmHg    MV Mean Gradient 2 mmHg    MV Max Velocity 1.0 m/s    MV Mean Velocity 0.6 m/s    MV VTI 32.3 cm    PV Peak Gradient 8 mmHg    PV Max Velocity 1.4 m/s    TAPSE 1.3 (A) 1.7 cm    Fractional Shortening 2D 21 28 - 44 %    LV ESV Index A4C 20 mL/m2    LV EDV Index A4C 43 mL/m2    LVIDd Index 2.09 cm/m2    LVIDs Index 1.65 cm/m2    LV RWT Ratio 0.54     LV Mass 2D 219.1 88 - 224 g    LV Mass 2D Index 95.3 49 - 115 g/m2    MV E/A 0.97     E/E' Ratio (Averaged) 14.78     E/E' Lateral 10.56     E/E' Septal 19.00     LVOT Stroke Volume Index 46.4 mL/m2    LVOT Area 3.8 cm2    LA Volume Index 4C 15 (A) 16 - 34 mL/m2    LA Size Index 1.52 cm/m2    AV Velocity Ratio 0.28     LVOT:AV VTI Index 0.33     ÁNGELA/BSA VTI 0.5 cm2/m2    ÁNGELA/BSA Peak Velocity 0.4 cm2/m2    MV:LVOT VTI Index 1.15    GLUCOSE, POC    Collection Time: 11/16/22  4:17 PM   Result Value Ref Range    Glucose (POC) 269 (H) 65 - 117 mg/dL    Performed by Cordell Hernandez. PCT    BLOOD GAS, ARTERIAL    Collection Time: 11/16/22  5:18 PM   Result Value Ref Range    pH 7.39 7.35 - 7.45      PCO2 44 35 - 45 mmHg    PO2 70 (L) 80 - 100 mmHg    O2 SAT 94 92 - 97 %    BICARBONATE 26 22 - 26 mmol/L    BASE EXCESS 0.4 mmol/L    O2 METHOD ROOM AIR      Sample source ARTERIAL      SITE LEFT RADIAL      MARSHA'S TEST NOT APPLICABLE     GLUCOSE, POC    Collection Time: 11/16/22  8:59 PM   Result Value Ref Range    Glucose (POC) 181 (H) 65 - 117 mg/dL    Performed by Cordell Hernandez.  PCT CBC W/O DIFF    Collection Time: 11/17/22  3:19 AM   Result Value Ref Range    WBC 7.2 4.1 - 11.1 K/uL    RBC 4.56 4.10 - 5.70 M/uL    HGB 12.7 12.1 - 17.0 g/dL    HCT 38.1 36.6 - 50.3 %    MCV 83.6 80.0 - 99.0 FL    MCH 27.9 26.0 - 34.0 PG    MCHC 33.3 30.0 - 36.5 g/dL    RDW 14.0 11.5 - 14.5 %    PLATELET 675 814 - 627 K/uL    MPV 10.3 8.9 - 12.9 FL    NRBC 0.0 0  WBC    ABSOLUTE NRBC 0.00 0.00 - 3.56 K/uL   METABOLIC PANEL, BASIC    Collection Time: 11/17/22  3:19 AM   Result Value Ref Range    Sodium 133 (L) 136 - 145 mmol/L    Potassium 4.5 3.5 - 5.1 mmol/L    Chloride 98 97 - 108 mmol/L    CO2 26 21 - 32 mmol/L    Anion gap 9 5 - 15 mmol/L    Glucose 352 (H) 65 - 100 mg/dL    BUN 17 6 - 20 MG/DL    Creatinine 1.11 0.70 - 1.30 MG/DL    BUN/Creatinine ratio 15 12 - 20      eGFR >60 >60 ml/min/1.73m2    Calcium 8.8 8.5 - 10.1 MG/DL   GLUCOSE, POC    Collection Time: 11/17/22  6:46 AM   Result Value Ref Range    Glucose (POC) 330 (H) 65 - 117 mg/dL    Performed by Sabrina Swenson RN        Provided Telemetry: [x] sinus  [] chronic afib   [] paroxysmal afib  [] NSVT      Assessment:     Active Problems:    Acute non-Q wave non-ST elevation myocardial infarction (NSTEMI) ([facility] [address] (11/15/2022)    S/W Dr. Akil Lobo. . AVR + FFR. Plan: Aortic Valve Disease  unchanged    Surgical consult      For all other plans, see orders.    [x] High complexity decision making was performed

## 2022-11-18 ENCOUNTER — TELEPHONE (OUTPATIENT)
Dept: ENDOCRINOLOGY | Age: 42
End: 2022-11-18

## 2022-11-18 LAB
ANION GAP SERPL CALC-SCNC: 6 MMOL/L (ref 5–15)
ATRIAL RATE: 77 BPM
BUN SERPL-MCNC: 18 MG/DL (ref 6–20)
BUN/CREAT SERPL: 18 (ref 12–20)
CALCIUM SERPL-MCNC: 9.2 MG/DL (ref 8.5–10.1)
CALCULATED P AXIS, ECG09: 49 DEGREES
CALCULATED R AXIS, ECG10: -6 DEGREES
CALCULATED T AXIS, ECG11: 108 DEGREES
CHLORIDE SERPL-SCNC: 98 MMOL/L (ref 97–108)
CO2 SERPL-SCNC: 27 MMOL/L (ref 21–32)
CREAT SERPL-MCNC: 1.02 MG/DL (ref 0.7–1.3)
DIAGNOSIS, 93000: NORMAL
ERYTHROCYTE [DISTWIDTH] IN BLOOD BY AUTOMATED COUNT: 14.1 % (ref 11.5–14.5)
GLUCOSE BLD STRIP.AUTO-MCNC: 208 MG/DL (ref 65–117)
GLUCOSE BLD STRIP.AUTO-MCNC: 256 MG/DL (ref 65–117)
GLUCOSE BLD STRIP.AUTO-MCNC: 279 MG/DL (ref 65–117)
GLUCOSE BLD STRIP.AUTO-MCNC: 298 MG/DL (ref 65–117)
GLUCOSE SERPL-MCNC: 353 MG/DL (ref 65–100)
HCT VFR BLD AUTO: 38.5 % (ref 36.6–50.3)
HGB BLD-MCNC: 12.9 G/DL (ref 12.1–17)
MCH RBC QN AUTO: 27.4 PG (ref 26–34)
MCHC RBC AUTO-ENTMCNC: 33.5 G/DL (ref 30–36.5)
MCV RBC AUTO: 81.7 FL (ref 80–99)
NRBC # BLD: 0 K/UL (ref 0–0.01)
NRBC BLD-RTO: 0 PER 100 WBC
P-R INTERVAL, ECG05: 164 MS
PLATELET # BLD AUTO: 181 K/UL (ref 150–400)
PMV BLD AUTO: 10.2 FL (ref 8.9–12.9)
POTASSIUM SERPL-SCNC: 4.3 MMOL/L (ref 3.5–5.1)
Q-T INTERVAL, ECG07: 370 MS
QRS DURATION, ECG06: 82 MS
QTC CALCULATION (BEZET), ECG08: 418 MS
RBC # BLD AUTO: 4.71 M/UL (ref 4.1–5.7)
SERVICE CMNT-IMP: ABNORMAL
SODIUM SERPL-SCNC: 131 MMOL/L (ref 136–145)
UFH PPP CHRO-ACNC: 0.22 IU/ML
UFH PPP CHRO-ACNC: 0.33 IU/ML
VENTRICULAR RATE, ECG03: 77 BPM
WBC # BLD AUTO: 8.1 K/UL (ref 4.1–11.1)

## 2022-11-18 PROCEDURE — 93571 IV DOP VEL&/PRESS C FLO 1ST: CPT | Performed by: STUDENT IN AN ORGANIZED HEALTH CARE EDUCATION/TRAINING PROGRAM

## 2022-11-18 PROCEDURE — 93454 CORONARY ARTERY ANGIO S&I: CPT | Performed by: STUDENT IN AN ORGANIZED HEALTH CARE EDUCATION/TRAINING PROGRAM

## 2022-11-18 PROCEDURE — 93572 IV DOP VEL&/PRESS C FLO EA: CPT | Performed by: STUDENT IN AN ORGANIZED HEALTH CARE EDUCATION/TRAINING PROGRAM

## 2022-11-18 PROCEDURE — 77030010221 HC SPLNT WR POS TELE -B: Performed by: STUDENT IN AN ORGANIZED HEALTH CARE EDUCATION/TRAINING PROGRAM

## 2022-11-18 PROCEDURE — C1887 CATHETER, GUIDING: HCPCS | Performed by: STUDENT IN AN ORGANIZED HEALTH CARE EDUCATION/TRAINING PROGRAM

## 2022-11-18 PROCEDURE — 74011636637 HC RX REV CODE- 636/637: Performed by: INTERNAL MEDICINE

## 2022-11-18 PROCEDURE — C1769 GUIDE WIRE: HCPCS | Performed by: STUDENT IN AN ORGANIZED HEALTH CARE EDUCATION/TRAINING PROGRAM

## 2022-11-18 PROCEDURE — 36415 COLL VENOUS BLD VENIPUNCTURE: CPT

## 2022-11-18 PROCEDURE — 99153 MOD SED SAME PHYS/QHP EA: CPT | Performed by: STUDENT IN AN ORGANIZED HEALTH CARE EDUCATION/TRAINING PROGRAM

## 2022-11-18 PROCEDURE — 85027 COMPLETE CBC AUTOMATED: CPT

## 2022-11-18 PROCEDURE — 85520 HEPARIN ASSAY: CPT

## 2022-11-18 PROCEDURE — 2709999900 HC NON-CHARGEABLE SUPPLY: Performed by: STUDENT IN AN ORGANIZED HEALTH CARE EDUCATION/TRAINING PROGRAM

## 2022-11-18 PROCEDURE — 74011000636 HC RX REV CODE- 636: Performed by: STUDENT IN AN ORGANIZED HEALTH CARE EDUCATION/TRAINING PROGRAM

## 2022-11-18 PROCEDURE — 74011250637 HC RX REV CODE- 250/637: Performed by: STUDENT IN AN ORGANIZED HEALTH CARE EDUCATION/TRAINING PROGRAM

## 2022-11-18 PROCEDURE — 65270000029 HC RM PRIVATE

## 2022-11-18 PROCEDURE — 74011250636 HC RX REV CODE- 250/636: Performed by: INTERNAL MEDICINE

## 2022-11-18 PROCEDURE — 77030019569 HC BND COMPR RAD TERU -B: Performed by: STUDENT IN AN ORGANIZED HEALTH CARE EDUCATION/TRAINING PROGRAM

## 2022-11-18 PROCEDURE — 74011000250 HC RX REV CODE- 250: Performed by: STUDENT IN AN ORGANIZED HEALTH CARE EDUCATION/TRAINING PROGRAM

## 2022-11-18 PROCEDURE — C1894 INTRO/SHEATH, NON-LASER: HCPCS | Performed by: STUDENT IN AN ORGANIZED HEALTH CARE EDUCATION/TRAINING PROGRAM

## 2022-11-18 PROCEDURE — 77030008543 HC TBNG MON PRSS MRTM -A: Performed by: STUDENT IN AN ORGANIZED HEALTH CARE EDUCATION/TRAINING PROGRAM

## 2022-11-18 PROCEDURE — 99233 SBSQ HOSP IP/OBS HIGH 50: CPT | Performed by: THORACIC SURGERY (CARDIOTHORACIC VASCULAR SURGERY)

## 2022-11-18 PROCEDURE — 77010033678 HC OXYGEN DAILY

## 2022-11-18 PROCEDURE — 80048 BASIC METABOLIC PNL TOTAL CA: CPT

## 2022-11-18 PROCEDURE — 74011250637 HC RX REV CODE- 250/637: Performed by: INTERNAL MEDICINE

## 2022-11-18 PROCEDURE — 74011250636 HC RX REV CODE- 250/636: Performed by: STUDENT IN AN ORGANIZED HEALTH CARE EDUCATION/TRAINING PROGRAM

## 2022-11-18 PROCEDURE — 99152 MOD SED SAME PHYS/QHP 5/>YRS: CPT | Performed by: STUDENT IN AN ORGANIZED HEALTH CARE EDUCATION/TRAINING PROGRAM

## 2022-11-18 PROCEDURE — 82962 GLUCOSE BLOOD TEST: CPT

## 2022-11-18 RX ORDER — HEPARIN SODIUM 200 [USP'U]/100ML
INJECTION, SOLUTION INTRAVENOUS
Status: COMPLETED | OUTPATIENT
Start: 2022-11-18 | End: 2022-11-18

## 2022-11-18 RX ORDER — GUAIFENESIN 100 MG/5ML
LIQUID (ML) ORAL AS NEEDED
Status: DISCONTINUED | OUTPATIENT
Start: 2022-11-18 | End: 2022-11-18 | Stop reason: HOSPADM

## 2022-11-18 RX ORDER — SODIUM CHLORIDE 0.9 % (FLUSH) 0.9 %
5-40 SYRINGE (ML) INJECTION AS NEEDED
Status: DISCONTINUED | OUTPATIENT
Start: 2022-11-18 | End: 2022-11-22

## 2022-11-18 RX ORDER — LIDOCAINE HYDROCHLORIDE 10 MG/ML
INJECTION, SOLUTION EPIDURAL; INFILTRATION; INTRACAUDAL; PERINEURAL AS NEEDED
Status: DISCONTINUED | OUTPATIENT
Start: 2022-11-18 | End: 2022-11-18 | Stop reason: HOSPADM

## 2022-11-18 RX ORDER — FENTANYL CITRATE 50 UG/ML
INJECTION, SOLUTION INTRAMUSCULAR; INTRAVENOUS AS NEEDED
Status: DISCONTINUED | OUTPATIENT
Start: 2022-11-18 | End: 2022-11-18 | Stop reason: HOSPADM

## 2022-11-18 RX ORDER — SODIUM CHLORIDE 0.9 % (FLUSH) 0.9 %
5-40 SYRINGE (ML) INJECTION EVERY 8 HOURS
Status: DISCONTINUED | OUTPATIENT
Start: 2022-11-18 | End: 2022-11-22

## 2022-11-18 RX ORDER — HEPARIN SODIUM 1000 [USP'U]/ML
INJECTION, SOLUTION INTRAVENOUS; SUBCUTANEOUS AS NEEDED
Status: DISCONTINUED | OUTPATIENT
Start: 2022-11-18 | End: 2022-11-18 | Stop reason: HOSPADM

## 2022-11-18 RX ORDER — MIDAZOLAM HYDROCHLORIDE 1 MG/ML
INJECTION, SOLUTION INTRAMUSCULAR; INTRAVENOUS AS NEEDED
Status: DISCONTINUED | OUTPATIENT
Start: 2022-11-18 | End: 2022-11-18 | Stop reason: HOSPADM

## 2022-11-18 RX ORDER — VERAPAMIL HYDROCHLORIDE 2.5 MG/ML
INJECTION, SOLUTION INTRAVENOUS AS NEEDED
Status: DISCONTINUED | OUTPATIENT
Start: 2022-11-18 | End: 2022-11-18 | Stop reason: HOSPADM

## 2022-11-18 RX ADMIN — SODIUM CHLORIDE, PRESERVATIVE FREE 10 ML: 5 INJECTION INTRAVENOUS at 09:15

## 2022-11-18 RX ADMIN — INSULIN GLARGINE 70 UNITS: 100 INJECTION, SOLUTION SUBCUTANEOUS at 09:12

## 2022-11-18 RX ADMIN — ASPIRIN 81 MG: 81 TABLET, COATED ORAL at 09:12

## 2022-11-18 RX ADMIN — ROSUVASTATIN CALCIUM 40 MG: 40 TABLET, FILM COATED ORAL at 09:12

## 2022-11-18 RX ADMIN — Medication 30 UNITS: at 12:10

## 2022-11-18 RX ADMIN — ACETAMINOPHEN 650 MG: 325 TABLET ORAL at 22:18

## 2022-11-18 RX ADMIN — SERTRALINE 100 MG: 50 TABLET, FILM COATED ORAL at 09:13

## 2022-11-18 RX ADMIN — LEVOTHYROXINE SODIUM 125 MCG: 0.07 TABLET ORAL at 09:12

## 2022-11-18 RX ADMIN — Medication 7 UNITS: at 12:09

## 2022-11-18 RX ADMIN — Medication 30 UNITS: at 17:09

## 2022-11-18 RX ADMIN — Medication 4 UNITS: at 22:04

## 2022-11-18 RX ADMIN — BUSPIRONE HYDROCHLORIDE 15 MG: 5 TABLET ORAL at 15:38

## 2022-11-18 RX ADMIN — Medication 30 UNITS: at 09:12

## 2022-11-18 RX ADMIN — METOPROLOL SUCCINATE 25 MG: 25 TABLET, FILM COATED, EXTENDED RELEASE ORAL at 09:12

## 2022-11-18 RX ADMIN — METOPROLOL SUCCINATE 25 MG: 25 TABLET, FILM COATED, EXTENDED RELEASE ORAL at 17:09

## 2022-11-18 RX ADMIN — BUSPIRONE HYDROCHLORIDE 15 MG: 5 TABLET ORAL at 09:12

## 2022-11-18 RX ADMIN — INSULIN GLARGINE 70 UNITS: 100 INJECTION, SOLUTION SUBCUTANEOUS at 22:05

## 2022-11-18 RX ADMIN — Medication 4 UNITS: at 17:09

## 2022-11-18 RX ADMIN — Medication 7 UNITS: at 07:34

## 2022-11-18 RX ADMIN — BUSPIRONE HYDROCHLORIDE 15 MG: 5 TABLET ORAL at 22:06

## 2022-11-18 RX ADMIN — SODIUM CHLORIDE, PRESERVATIVE FREE 10 ML: 5 INJECTION INTRAVENOUS at 22:07

## 2022-11-18 RX ADMIN — PANTOPRAZOLE SODIUM 40 MG: 40 TABLET, DELAYED RELEASE ORAL at 09:12

## 2022-11-18 RX ADMIN — HEPARIN SODIUM 2000 UNITS: 1000 INJECTION INTRAVENOUS; SUBCUTANEOUS at 01:10

## 2022-11-18 NOTE — PROGRESS NOTES
0745: Pt off unit to CCL.    0902: Pt back from CCL. TR band @ 12cc. R radial CDI, no hematoma. 1055: TR band removed. R radial CDI, no hematoma. 1545: Restart heparin gtt per Dionisio Rivera MD. Heparin restarted.  Confirmed with Belen Herring, pharmacist to restart at old rate of 14u/kg/hr

## 2022-11-18 NOTE — TELEPHONE ENCOUNTER
LEFTY: Spoke with patient. He states that he is still in [facility]. He will be having CABG and an aortic valve replacement next week. He wanted to let  know.

## 2022-11-18 NOTE — PROGRESS NOTES
Brief Procedure Note:         Indication:   CAD    Procedure:   IFR of LAD and RCA    Complications: None   Blood loss: Minimal   Condition: Stable     Brief procedure Result: Moderate diffuse LAD disease significant by IFR (0.69)   Moderate RCA disease not significant by IFR (0.93)     Recommendations:    Moderate diffuse LAD disease significant by IFR - Would need to revascularized with AVR         Full note and recommendations to follow. '

## 2022-11-18 NOTE — TELEPHONE ENCOUNTER
Patient needs to reschedule appointment from 11/21/22. Please call patient to schedule an appointment.  Thank you

## 2022-11-18 NOTE — PROGRESS NOTES
Problem: Diabetes Self-Management  Goal: *Disease process and treatment process  Description: Define diabetes and identify own type of diabetes; list 3 options for treating diabetes. Outcome: Progressing Towards Goal  Goal: *Incorporating nutritional management into lifestyle  Description: Describe effect of type, amount and timing of food on blood glucose; list 3 methods for planning meals. Outcome: Progressing Towards Goal  Goal: *Incorporating physical activity into lifestyle  Description: State effect of exercise on blood glucose levels. Outcome: Progressing Towards Goal  Goal: *Developing strategies to promote health/change behavior  Description: Define the ABC's of diabetes; identify appropriate screenings, schedule and personal plan for screenings. Outcome: Progressing Towards Goal  Goal: *Using medications safely  Description: State effect of diabetes medications on diabetes; name diabetes medication taking, action and side effects. Outcome: Progressing Towards Goal  Goal: *Monitoring blood glucose, interpreting and using results  Description: Identify recommended blood glucose targets  and personal targets. Outcome: Progressing Towards Goal  Goal: *Prevention, detection, treatment of acute complications  Description: List symptoms of hyper- and hypoglycemia; describe how to treat low blood sugar and actions for lowering  high blood glucose level. Outcome: Progressing Towards Goal  Goal: *Prevention, detection and treatment of chronic complications  Description: Define the natural course of diabetes and describe the relationship of blood glucose levels to long term complications of diabetes.   Outcome: Progressing Towards Goal  Goal: *Developing strategies to address psychosocial issues  Description: Describe feelings about living with diabetes; identify support needed and support network  Outcome: Progressing Towards Goal  Goal: *Insulin pump training  Outcome: Progressing Towards Goal  Goal: *Sick day guidelines  Outcome: Progressing Towards Goal  Goal: *Patient Specific Goal (EDIT GOAL, INSERT TEXT)  Outcome: Progressing Towards Goal     Problem: Patient Education: Go to Patient Education Activity  Goal: Patient/Family Education  Outcome: Progressing Towards Goal     Problem: Falls - Risk of  Goal: *Absence of Falls  Description: Document Delgado Blades Fall Risk and appropriate interventions in the flowsheet.   Outcome: Progressing Towards Goal  Note: Fall Risk Interventions:            Medication Interventions: Teach patient to arise slowly                   Problem: Patient Education: Go to Patient Education Activity  Goal: Patient/Family Education  Outcome: Progressing Towards Goal

## 2022-11-19 LAB
ANION GAP SERPL CALC-SCNC: 6 MMOL/L (ref 5–15)
BUN SERPL-MCNC: 16 MG/DL (ref 6–20)
BUN/CREAT SERPL: 15 (ref 12–20)
CALCIUM SERPL-MCNC: 8.6 MG/DL (ref 8.5–10.1)
CHLORIDE SERPL-SCNC: 97 MMOL/L (ref 97–108)
CO2 SERPL-SCNC: 27 MMOL/L (ref 21–32)
CREAT SERPL-MCNC: 1.05 MG/DL (ref 0.7–1.3)
ERYTHROCYTE [DISTWIDTH] IN BLOOD BY AUTOMATED COUNT: 14.3 % (ref 11.5–14.5)
GLUCOSE BLD STRIP.AUTO-MCNC: 152 MG/DL (ref 65–117)
GLUCOSE BLD STRIP.AUTO-MCNC: 158 MG/DL (ref 65–117)
GLUCOSE BLD STRIP.AUTO-MCNC: 277 MG/DL (ref 65–117)
GLUCOSE BLD STRIP.AUTO-MCNC: 352 MG/DL (ref 65–117)
GLUCOSE BLD STRIP.AUTO-MCNC: 359 MG/DL (ref 65–117)
GLUCOSE BLD STRIP.AUTO-MCNC: 400 MG/DL (ref 65–117)
GLUCOSE BLD STRIP.AUTO-MCNC: 85 MG/DL (ref 65–117)
GLUCOSE SERPL-MCNC: 374 MG/DL (ref 65–100)
HCT VFR BLD AUTO: 37.1 % (ref 36.6–50.3)
HGB BLD-MCNC: 12.8 G/DL (ref 12.1–17)
MCH RBC QN AUTO: 28.6 PG (ref 26–34)
MCHC RBC AUTO-ENTMCNC: 34.5 G/DL (ref 30–36.5)
MCV RBC AUTO: 82.8 FL (ref 80–99)
NRBC # BLD: 0 K/UL (ref 0–0.01)
NRBC BLD-RTO: 0 PER 100 WBC
PLATELET # BLD AUTO: 179 K/UL (ref 150–400)
PMV BLD AUTO: 10.2 FL (ref 8.9–12.9)
POTASSIUM SERPL-SCNC: 4.2 MMOL/L (ref 3.5–5.1)
RBC # BLD AUTO: 4.48 M/UL (ref 4.1–5.7)
SERVICE CMNT-IMP: ABNORMAL
SERVICE CMNT-IMP: NORMAL
SODIUM SERPL-SCNC: 130 MMOL/L (ref 136–145)
UFH PPP CHRO-ACNC: 0.34 IU/ML
WBC # BLD AUTO: 7.7 K/UL (ref 4.1–11.1)

## 2022-11-19 PROCEDURE — 85027 COMPLETE CBC AUTOMATED: CPT

## 2022-11-19 PROCEDURE — 82962 GLUCOSE BLOOD TEST: CPT

## 2022-11-19 PROCEDURE — 65270000029 HC RM PRIVATE

## 2022-11-19 PROCEDURE — 74011250637 HC RX REV CODE- 250/637: Performed by: INTERNAL MEDICINE

## 2022-11-19 PROCEDURE — 74011636637 HC RX REV CODE- 636/637: Performed by: INTERNAL MEDICINE

## 2022-11-19 PROCEDURE — 85520 HEPARIN ASSAY: CPT

## 2022-11-19 PROCEDURE — 74011250636 HC RX REV CODE- 250/636: Performed by: INTERNAL MEDICINE

## 2022-11-19 PROCEDURE — 74011000250 HC RX REV CODE- 250: Performed by: STUDENT IN AN ORGANIZED HEALTH CARE EDUCATION/TRAINING PROGRAM

## 2022-11-19 PROCEDURE — 99231 SBSQ HOSP IP/OBS SF/LOW 25: CPT | Performed by: THORACIC SURGERY (CARDIOTHORACIC VASCULAR SURGERY)

## 2022-11-19 PROCEDURE — 80048 BASIC METABOLIC PNL TOTAL CA: CPT

## 2022-11-19 PROCEDURE — 74011250637 HC RX REV CODE- 250/637: Performed by: NURSE PRACTITIONER

## 2022-11-19 PROCEDURE — 36415 COLL VENOUS BLD VENIPUNCTURE: CPT

## 2022-11-19 RX ORDER — INSULIN LISPRO 100 [IU]/ML
36 INJECTION, SOLUTION INTRAVENOUS; SUBCUTANEOUS
Status: DISCONTINUED | OUTPATIENT
Start: 2022-11-19 | End: 2022-11-21

## 2022-11-19 RX ADMIN — PANTOPRAZOLE SODIUM 40 MG: 40 TABLET, DELAYED RELEASE ORAL at 09:27

## 2022-11-19 RX ADMIN — Medication 36 UNITS: at 11:20

## 2022-11-19 RX ADMIN — SODIUM CHLORIDE, PRESERVATIVE FREE 10 ML: 5 INJECTION INTRAVENOUS at 21:53

## 2022-11-19 RX ADMIN — Medication 10 UNITS: at 09:27

## 2022-11-19 RX ADMIN — ASPIRIN 81 MG: 81 TABLET, COATED ORAL at 09:27

## 2022-11-19 RX ADMIN — SERTRALINE 100 MG: 50 TABLET, FILM COATED ORAL at 09:27

## 2022-11-19 RX ADMIN — HEPARIN SODIUM 14 UNITS/KG/HR: 10000 INJECTION, SOLUTION INTRAVENOUS at 21:48

## 2022-11-19 RX ADMIN — LORAZEPAM 1 MG: 1 TABLET ORAL at 04:02

## 2022-11-19 RX ADMIN — HEPARIN SODIUM 14 UNITS/KG/HR: 10000 INJECTION, SOLUTION INTRAVENOUS at 06:00

## 2022-11-19 RX ADMIN — INSULIN GLARGINE 70 UNITS: 100 INJECTION, SOLUTION SUBCUTANEOUS at 09:27

## 2022-11-19 RX ADMIN — Medication 36 UNITS: at 17:29

## 2022-11-19 RX ADMIN — BUSPIRONE HYDROCHLORIDE 15 MG: 5 TABLET ORAL at 21:52

## 2022-11-19 RX ADMIN — BUSPIRONE HYDROCHLORIDE 15 MG: 5 TABLET ORAL at 15:16

## 2022-11-19 RX ADMIN — Medication 7 UNITS: at 11:18

## 2022-11-19 RX ADMIN — INSULIN GLARGINE 70 UNITS: 100 INJECTION, SOLUTION SUBCUTANEOUS at 22:46

## 2022-11-19 RX ADMIN — METOPROLOL SUCCINATE 25 MG: 25 TABLET, FILM COATED, EXTENDED RELEASE ORAL at 17:30

## 2022-11-19 RX ADMIN — NITROGLYCERIN 1 INCH: 20 OINTMENT TOPICAL at 01:08

## 2022-11-19 RX ADMIN — BUSPIRONE HYDROCHLORIDE 15 MG: 5 TABLET ORAL at 09:27

## 2022-11-19 RX ADMIN — ROSUVASTATIN CALCIUM 40 MG: 40 TABLET, FILM COATED ORAL at 09:27

## 2022-11-19 RX ADMIN — Medication 30 UNITS: at 09:28

## 2022-11-19 RX ADMIN — SODIUM CHLORIDE, PRESERVATIVE FREE 10 ML: 5 INJECTION INTRAVENOUS at 06:00

## 2022-11-19 RX ADMIN — LORAZEPAM 1 MG: 1 TABLET ORAL at 21:52

## 2022-11-19 RX ADMIN — METOPROLOL SUCCINATE 25 MG: 25 TABLET, FILM COATED, EXTENDED RELEASE ORAL at 09:27

## 2022-11-19 RX ADMIN — LEVOTHYROXINE SODIUM 125 MCG: 0.07 TABLET ORAL at 06:18

## 2022-11-19 NOTE — PROGRESS NOTES
[facility] FLOOR Progress Note    Admit Date: 11/15/2022  POD:  Day 1 Post-Op      Procedure:  Procedure(s):  LEFT HEART CATH / CORONARY ANGIOGRAPHY    Subjective/overnight events:   Pt seen with Dr. Paige Bird, in bed. In NSR, on RA, afebrile . VSS. No events overnight. Anxious about surgery .            Objective:     Visit Vitals  /68 (BP 1 Location: Left upper arm, BP Patient Position: At rest)   Pulse 72   Temp 97.7 °F (36.5 °C)   Resp 16   Ht 5' 9\" (1.753 m)   Wt 253 lb 1.4 oz (114.8 kg)   SpO2 98%   BMI 37.37 kg/m²     Temp (24hrs), Av.1 °F (36.7 °C), Min:97.7 °F (36.5 °C), Max:98.5 °F (36.9 °C)      Last 24hr Input/Output:    Intake/Output Summary (Last 24 hours) at 2022 1009  Last data filed at 2022 0932  Gross per 24 hour   Intake 1190 ml   Output --   Net 1190 ml          Chest tube output: N/A    EKG/Rhythm:   EKG Results       Procedure 720 Value Units Date/Time    EKG, 12 LEAD, INITIAL [[id number]] Collected: 22 1532    Order Status: Completed Updated: 22 1017     Ventricular Rate 77 BPM      Atrial Rate 77 BPM      P-R Interval 164 ms      QRS Duration 82 ms      Q-T Interval 370 ms      QTC Calculation (Bezet) 418 ms      Calculated P Axis 49 degrees      Calculated R Axis -6 degrees      Calculated T Axis 108 degrees      Diagnosis --     Normal sinus rhythm  Minimal voltage criteria for LVH, may be normal variant ( R in aVL )  Nonspecific ST and T wave abnormality  Poor R-wave Progression (consider lead placement or loss of anterior forces)    Confirmed by Edi Bryan MD, Raf Russell ([id number]) on 2022 10:17:13 AM      EKG, 12 LEAD, INITIAL [[id number]] Collected: 11/15/22 1728    Order Status: Completed Updated: 22 1136     Ventricular Rate 83 BPM      Atrial Rate 83 BPM      P-R Interval 160 ms      QRS Duration 84 ms      Q-T Interval 358 ms      QTC Calculation (Bezet) 420 ms      Calculated P Axis 50 degrees      Calculated R Axis 3 degrees      Calculated T Axis 126 degrees      Diagnosis --     Normal sinus rhythm  Left ventricular hypertrophy with repolarization abnormality  When compared with ECG of 14-SEP-2022 12:27,  No significant change was found  Confirmed by Rosy Cedillo ([id number]) on 11/16/2022 11:35:53 AM              Oxygen: RA    CXR:   CXR Results  (Last 48 hours)      None          CT Results  (Last 48 hours)      None          11/15/22    ECHO ADULT COMPLETE 11/16/2022 11/16/2022    Interpretation Summary    Left Ventricle: Low normal left ventricular systolic function with a visually estimated EF of 50 - 55%. Left ventricle size is normal. Increased wall thickness. Normal wall motion. Aortic Valve: Not well visualized. Possible bicuspid aortic valve. Thickened cusp. Calcified cusp with restricted leaflet motion. Moderate to Severe stenosis of the aortic valve. Mean gradient 39.9 mm Hg. Peak veolocity 4.05 m/sec. Technical qualifiers: Echo study was technically difficult due to patient's body habitus. Signed by: Martha Palmer MD on 11/16/2022  4:16 PM    11/15/22    CARDIAC PROCEDURE 11/16/2022 11/16/2022    Conclusion  Cardiac Cathetherization Note    PreOp Diagnosis / Indication:  Chest pain, sob. History of PCI to LAD, LCx and RCA    Procedure Performed:  1 LHC, Cors, Cineflouroscopy  2 AV study  3 LV gram      I have explained the nature of cardiac catheterization and possible percutaneous coronary intervention including risks and benefits of the procedure with the patient which include at least a 1:1000 risk for diagnostic procedure and 1/100 risk for percutaneous intervention.     Risks include but are not limited to risk of heart attack, stroke, vascular trauma requiring surgical repair or transfusion, abnormal heart rhythm requiring defibrillation or pacemaker, need for intraaortic balloon pump support, renal dysfunction requiring dialysis, exacerbated gastrointestinal bleeding, allergic response to medications requiring ventilatory support, emergent cardiac surgery and even death. They also understand the need for medical compliance - particularly if stenting is required - mandating continued daily consumption of aspirin and plavix or other antiplatelet therapy. Differences between medicated stent versus bare metal stent reviewed with patient. The patient expresses an understanding and verbally consents. They also understand plans for either radial or femoral access - with unique risks to both vascular beds including arterial occlusion, vascular trauma, hematoma and need for vascular surgery. I have answered all of their questions regarding the procedure and they are willing to proceed. Procedure status: [x]  Elective  [] Urgent  [] Emergent  Operators:  Keyanna Burnett  Assistants: None  Access:   [x]  RIGHT Radial  []  LEFT Radial  [] RCFA  []  LCFA  []  RCFV  []  LCFV  Catheters: TIG, JR  Closure: [x]  TR Band  []  Angioseal  []  Perclose  []  Manual Compression  Tubes/Drains:  [x] No tubes or drains remain from this procedure  [] Other:  Estimated Blood Loss: Minimal  Specimens: None  Sedation: Moderate conscious sedation with IV fentany & versed, local anesthesia with 1% lidocaine. This was performed by non-anesthesia personnel and I provided direct supervision to a trained independent observer. Time under moderate sedation: 1hr 10  Min  Patient age:  36 y.o. Complications:  [x] None  [] Other:  Patient Condition at the end of the procedure:  [x] Stable  [] Other:    Hemodynamics: Ao: 111/74  LV:  163/17, EDP 30, There was 50-60 mm Hg gradient during pull back    Simultaneous pressure measurement of LV and Ao performed  Mean gradient across Aortic valve was 50.5 mm Hg suggesting severe AS. Cors:    Dominance: [x] Right  [] Left  [] Mixed    LM: Very short. No significant disease.     LAD: Large caliber vessel that wraps around the apex which has patent stent, 30% proximal narrowing, with up 40-50% narrowing in proximal segment. D1: Small to moderate caliber vessel with 90% ostial narrowing, jailed from LAD stent. LCX: Large caliber vessel without significant stenosis, patent LCx to OM stent. OM1: Moderate caliber vessel with patent stent in mid segment and diffuse disease distally. 60-70% narrowing in distal vessel. RCA: Moderate vessel with patent stent and 40-50% narrowing in mid segment. PDA: Moderate caliber vessel without significant stenosis. PLB: Small caliber vessel without significant stenosis. LV angiography:  EF: 50-55%  Wall motion:  NA  MR: None minimal      Findings/PostOp Diagnosis:  1. Mild to moderate disease in major epicardial artery  2. Severe ostial disease of diagonal branch (jailed from LAD stent)  3. Severe aortic stenosis with mean gradient 51 mm Hg (by simultaneous pressure measurement)  4. Mildly dilated ascending aorta  5. Elevated left sided filling pressures    Recommendations:  1. Echocardiogram to assess aortic stenosis and AV morphology  2. Routine post procedure & access site care  3. Continue aggressive medical management and risk factor modification    Siena Webster MD    Signed by: Siena Webster MD on 11/16/2022  4:54 PM        PFTs (11/16/22):      Carotid US:   11/16/22- preliminary report: There is mild stenosis in the right ICA (<50%). There is mild stenosis in the left ICA (<50%). The right vertebral is antegrade. The left vertebral is antegrade.       Admission Weight: Last Weight   Weight: 257 lb 15 oz (117 kg) Weight: 253 lb 1.4 oz (114.8 kg)       EXAM:  General: Awake, alert, oriented  and no acute distress   Lungs:    Diminished to ausculation bilaterally   Incision:  N/A   Heart:   grade II systolic ejection murmur right of the sternal border, 2nd ICS   Abdomen:    Soft, non-distended, non-tender and obese   Extremities:  Non-pitting edema BLE   Neurologic:  Gross motor and sensory apparatus intact         Activity: OOB TID, ambulate     Diet: Cardiac    Lab Data Reviewed:   Recent Labs     11/19/22  0855 11/19/22  0632 11/19/22  0349   WBC  --   --  7.7   HGB  --   --  12.8   HCT  --   --  37.1   PLT  --   --  179   NA  --   --  130*   K  --   --  4.2   BUN  --   --  16   CREA  --   --  1.05   GLU  --   --  374*   GLUCPOC 352*   < >  --     < > = values in this interval not displayed. Assessment:     Active Problems:    Acute non-Q wave non-ST elevation myocardial infarction (NSTEMI) ([facility] [address] (11/15/2022)           Plan/Recommendations/Medical Decision Making:     Severe symptomatic AS, most likely a bicuspid valve. Plan for bioprosthetic AVR with CABG next week. NSTEMI, in the setting of known CAD s/p stents, s/p C 11/16/22 and 11/18/22- plan for CABG with AVR. On ASA, Plavix, BB, heparin gtt; continue. Dilated ascending aorta. No aneurysm noted on CT as above. SOB in the setting of AS. . Repeat echo and PFT results as above. Plan for SAVR as above. HTN. On ACEI, BB PTA; currently only on BB. XOL. Continue statin. WALLY , not on CPAP. Was unable to tolerate due to anxiety; he has been working on this with Sleep Medicine. OP follow-up. DMII with hyperglycemia. On Toujeo at home. Currently on Lantus and SSI per primary team. Repeat A1C 9.0- will need to start insulin gtt day prior to surgery. Hypothyroidism. On Synthroid PTA; continue. Repeat TSH 1.96. GERD. Continue PPI. Anxiety and depression. On Buspar, Zoloft; continue. Supportive care. Obesity. BMI 38.09%. Diet and exercise counseling. DVT ppx- Heparin gtt  Dispo- Remain on stepdown    Time spent: 15 minutes    Signed By: Karuna Smyth NP    Addendum: Addendum: Pt seen and examined. Agree with LATASHA Rader note. Doing well. Answered all his questions, which were related to timing. Will be either Tuesday or Wednesday. Encourage ambulation.      Time spent: 20 minutes

## 2022-11-19 NOTE — PROGRESS NOTES
Cardiology Progress Note  11/19/2022 8:36 AM  Admit Date: 11/15/2022  Admit Diagnosis/CC: Acute non-Q wave non-ST elevation myocardial infarction (NSTEMI) ([facility] [address] [I21.4]  Subjective:   Patient reports:  Chest Pain:  [x] none,  consistent with [] non-cardiac  [] atypical  []  anginal chest pain             [x] none now    []  on-going  Dyspnea: [x] none  [] at rest  [] with exertion  [] improved  [] unchanged [] worsening  PND:       [x] none  [] overnight   [] current  Orthopnea: [x] none  [] improved  [] unchanged  [] worsening  Presyncope: [x] none  [] improved  [] unchanged  [] worsening  Ambulated in hallway without symptoms  [] Yes  Ambulated in room without symptoms  [x] Yes  ROS(2+other systems)   Hematuria: [] Yes  [x] No.   Dysuria: [] Yes  [x] No                                           Cough:       [] Yes  [x] No.   Sputum: [] Yes  [x] No                                            Hematochezia: [] Yes  [x] No.   Melena: [] Yes  [x] No                                            No change in family and social history from H&P/Consult note.     Current Facility-Administered Medications   Medication Dose Route Frequency    sodium chloride (NS) flush 5-40 mL  5-40 mL IntraVENous Q8H    sodium chloride (NS) flush 5-40 mL  5-40 mL IntraVENous PRN    insulin lispro (HUMALOG) injection 30 Units  30 Units SubCUTAneous TIDAC    nitroglycerin (NITROBID) 2 % ointment 1 Inch  1 Inch Topical Q6H PRN    insulin glargine (LANTUS) injection 70 Units  70 Units SubCUTAneous DAILY    And    insulin glargine (LANTUS) injection 70 Units  70 Units SubCUTAneous QHS    heparin 25,000 units in D5W 250 ml infusion  8-25 Units/kg/hr IntraVENous TITRATE    heparin (porcine) 1,000 unit/mL injection 2,000 Units  2,000 Units IntraVENous PRN    Or    heparin (porcine) 1,000 unit/mL injection 4,000 Units  4,000 Units IntraVENous PRN    aspirin delayed-release tablet 81 mg  81 mg Oral DAILY    [Held by provider] clopidogreL (PLAVIX) tablet 75 mg  75 mg Oral DAILY    pantoprazole (PROTONIX) tablet 40 mg  40 mg Oral ACB    levothyroxine (SYNTHROID) tablet 125 mcg  125 mcg Oral ACB    LORazepam (ATIVAN) tablet 1 mg  1 mg Oral Q8H PRN    metoprolol succinate (TOPROL-XL) XL tablet 25 mg  25 mg Oral BID    rosuvastatin (CRESTOR) tablet 40 mg  40 mg Oral DAILY    sertraline (ZOLOFT) tablet 100 mg  100 mg Oral DAILY    acetaminophen (TYLENOL) tablet 650 mg  650 mg Oral Q6H PRN    Or    acetaminophen (TYLENOL) suppository 650 mg  650 mg Rectal Q6H PRN    polyethylene glycol (MIRALAX) packet 17 g  17 g Oral DAILY PRN    ondansetron (ZOFRAN ODT) tablet 4 mg  4 mg Oral Q8H PRN    Or    ondansetron (ZOFRAN) injection 4 mg  4 mg IntraVENous Q6H PRN    insulin lispro (HUMALOG) injection   SubCUTAneous AC&HS    glucose chewable tablet 16 g  4 Tablet Oral PRN    glucagon (GLUCAGEN) injection 1 mg  1 mg IntraMUSCular PRN    dextrose 10% infusion 0-250 mL  0-250 mL IntraVENous PRN    busPIRone (BUSPAR) tablet 15 mg  15 mg Oral TID       Objective:    Physical Exam:  Last VS: Visit Vitals  /68 (BP 1 Location: Left upper arm, BP Patient Position: At rest)   Pulse 72   Temp 97.7 °F (36.5 °C)   Resp 16   Ht 5' 9\" (1.753 m)   Wt 114.8 kg (253 lb 1.4 oz)   SpO2 98%   BMI 37.37 kg/m²      Temp (24hrs), Av.1 °F (36.7 °C), Min:97.7 °F (36.5 °C), Max:98.5 °F (36.9 °C)    24 hr VS reviewed. General Appearance:   [x] well developed, well nourished,  [x] NAD. [] agitated   [] lethargic but arousable  [] obtunded   ENT, Palate:    [x] WNL   [] dry palate/MM        Respiratory:    [x] CTA bilateral  [] rales  [] rhonchi  [] normal resp effort      [] similar to yesterday   [] worse    [] improved   Cardiovascular:   [x] RRR   [] Irregular rate and rhythm   [x] Normal S1, S2   [x] No gallop or rub.    [] no murmur   [x] no new murmur  [] murmur c/w:   [x] no edema  RLE: []1+ []2+ [] 3+;  LLE: []1+ []2+ []3+      [] edema similar to yesterday   [] worse    [] improved [x] normal JVP   [] Elevated JVP    [x] JVP similar to yesterday   [] worse    [] improved   [x] carotid upstroke unchanged   [x] abd aorta not palpated   [x] no stigmata of peripheral emboli   GI:    [x] abd soft, non-distented,bowel sounds present, no                     organomegaly appreciated   Skin:  Neuro:    Cath Site:  [x] warm and dry [] cold extremities   [x] A/O x 3, grossly non-focal      [] intact w/o hematoma or bruit; distal pulse unchanged. Data Review:   Labs:    Recent Results (from the past 24 hour(s))   GLUCOSE, POC    Collection Time: 11/18/22 11:14 AM   Result Value Ref Range    Glucose (POC) 256 (H) 65 - 117 mg/dL    Performed by Fernand Favre" RN    GLUCOSE, POC    Collection Time: 11/18/22  4:37 PM   Result Value Ref Range    Glucose (POC) 208 (H) 65 - 117 mg/dL    [facility].  PCT    GLUCOSE, POC    Collection Time: 11/18/22  9:07 PM   Result Value Ref Range    Glucose (POC) 298 (H) 65 - 117 mg/dL    Performed by [facility]. PCT    UNFRACTIONATED AND LMW HEPARIN    Collection Time: 11/18/22 10:09 PM   Result Value Ref Range    Heparin Xa,Unfrac. and LMW 0.33 IU/mL   GLUCOSE, POC    Collection Time: 11/19/22  3:18 AM   Result Value Ref Range    Glucose (POC) 359 (H) 65 - 117 mg/dL    Performed by Stas Burnett RN (BSN)    UNFRACTIONATED AND LMW HEPARIN    Collection Time: 11/19/22  3:49 AM   Result Value Ref Range    Heparin Xa,Unfrac. and LMW 0.34 IU/mL   CBC W/O DIFF    Collection Time: 11/19/22  3:49 AM   Result Value Ref Range    WBC 7.7 4.1 - 11.1 K/uL    RBC 4.48 4.10 - 5.70 M/uL    HGB 12.8 12.1 - 17.0 g/dL    HCT 37.1 36.6 - 50.3 %    MCV 82.8 80.0 - 99.0 FL    MCH 28.6 26.0 - 34.0 PG    MCHC 34.5 30.0 - 36.5 g/dL    RDW 14.3 11.5 - 14.5 %    PLATELET 333 156 - 215 K/uL    MPV 10.2 8.9 - 12.9 FL    NRBC 0.0 0  WBC    ABSOLUTE NRBC 0.00 0.00 - 2.67 K/uL   METABOLIC PANEL, BASIC    Collection Time: 11/19/22  3:49 AM   Result Value Ref Range    Sodium 130 (L) 136 - 145 mmol/L    Potassium 4.2 3.5 - 5.1 mmol/L    Chloride 97 97 - 108 mmol/L    CO2 27 21 - 32 mmol/L    Anion gap 6 5 - 15 mmol/L    Glucose 374 (H) 65 - 100 mg/dL    BUN 16 6 - 20 MG/DL    Creatinine 1.05 0.70 - 1.30 MG/DL    BUN/Creatinine ratio 15 12 - 20      eGFR >60 >60 ml/min/1.73m2    Calcium 8.6 8.5 - 10.1 MG/DL   GLUCOSE, POC    Collection Time: 11/19/22  6:32 AM   Result Value Ref Range    Glucose (POC) 400 (H) 65 - 117 mg/dL    Performed by Paulie Triplett RN (BSN)    GLUCOSE, POC    Collection Time: 11/19/22  8:55 AM   Result Value Ref Range    Glucose (POC) 352 (H) 65 - 117 mg/dL    Performed by Juliocesar Pressley RN        Provided Telemetry: [x] sinus  [] chronic afib   [] paroxysmal afib  [] NSVT      Assessment:     Active Problems:    Acute non-Q wave non-ST elevation myocardial infarction (NSTEMI) ([facility] [address] (11/15/2022)  S/W Dr. Loraine Barbour. . AVR + FFR. Plan:   - Cont heparin  - Cont asa  - Cont metoprolol  - Cont crestor    For CABG/AVR next week      For all other plans, see orders.    [x] High complexity decision making was performed

## 2022-11-19 NOTE — PROGRESS NOTES
0815: Perfect serve sent to Paras Butler MD about BG of 400. Wants recheck. Recheck . Pt given 30 units Lispro with meals as ordered plus 10 units Lispor per Paras Butler MD to follow sliding scale. See MAR.     1111:  36 units Lispro with meals now ordered, given per MAR. 7 units Lispro given as ordered by sliding scale. Pt received 43 units total of Lispro. See MAR. Verbal order to increase pts diet to include double protein and non starchy vegetable diet order. RD also available and suggesting low carb supplement to help pt feel full. Pt also informed me that he is Type 1 DM. 1513: Checked pts BG per pts request d/t him feeling shaky. BG 85. Pt reports eating hamburger with only top portion of bun and side salad with dressing at lunch with no snacks as of yet. Pop crackers and PB given to keep BG from dropping more. Will recheck BG before dinner. 1634: Dinner BG check . Notified MD. Paras Butler MD asked to give 36 units Lispro as ordered and hold sliding scale dose. See MAR.

## 2022-11-20 LAB
ANION GAP SERPL CALC-SCNC: 7 MMOL/L (ref 5–15)
BUN SERPL-MCNC: 17 MG/DL (ref 6–20)
BUN/CREAT SERPL: 18 (ref 12–20)
CALCIUM SERPL-MCNC: 8.4 MG/DL (ref 8.5–10.1)
CHLORIDE SERPL-SCNC: 103 MMOL/L (ref 97–108)
CO2 SERPL-SCNC: 25 MMOL/L (ref 21–32)
CREAT SERPL-MCNC: 0.97 MG/DL (ref 0.7–1.3)
ERYTHROCYTE [DISTWIDTH] IN BLOOD BY AUTOMATED COUNT: 14.1 % (ref 11.5–14.5)
GLUCOSE BLD STRIP.AUTO-MCNC: 181 MG/DL (ref 65–117)
GLUCOSE BLD STRIP.AUTO-MCNC: 185 MG/DL (ref 65–117)
GLUCOSE BLD STRIP.AUTO-MCNC: 196 MG/DL (ref 65–117)
GLUCOSE BLD STRIP.AUTO-MCNC: 209 MG/DL (ref 65–117)
GLUCOSE BLD STRIP.AUTO-MCNC: 299 MG/DL (ref 65–117)
GLUCOSE SERPL-MCNC: 305 MG/DL (ref 65–100)
HCT VFR BLD AUTO: 38.4 % (ref 36.6–50.3)
HGB BLD-MCNC: 13.2 G/DL (ref 12.1–17)
MCH RBC QN AUTO: 28.3 PG (ref 26–34)
MCHC RBC AUTO-ENTMCNC: 34.4 G/DL (ref 30–36.5)
MCV RBC AUTO: 82.2 FL (ref 80–99)
NRBC # BLD: 0 K/UL (ref 0–0.01)
NRBC BLD-RTO: 0 PER 100 WBC
PLATELET # BLD AUTO: 187 K/UL (ref 150–400)
PMV BLD AUTO: 10.1 FL (ref 8.9–12.9)
POTASSIUM SERPL-SCNC: 4.1 MMOL/L (ref 3.5–5.1)
RBC # BLD AUTO: 4.67 M/UL (ref 4.1–5.7)
SERVICE CMNT-IMP: ABNORMAL
SODIUM SERPL-SCNC: 135 MMOL/L (ref 136–145)
UFH PPP CHRO-ACNC: 0.3 IU/ML
WBC # BLD AUTO: 8.9 K/UL (ref 4.1–11.1)

## 2022-11-20 PROCEDURE — 85520 HEPARIN ASSAY: CPT

## 2022-11-20 PROCEDURE — 85027 COMPLETE CBC AUTOMATED: CPT

## 2022-11-20 PROCEDURE — 80048 BASIC METABOLIC PNL TOTAL CA: CPT

## 2022-11-20 PROCEDURE — 74011636637 HC RX REV CODE- 636/637: Performed by: INTERNAL MEDICINE

## 2022-11-20 PROCEDURE — 36415 COLL VENOUS BLD VENIPUNCTURE: CPT

## 2022-11-20 PROCEDURE — 74011000250 HC RX REV CODE- 250: Performed by: STUDENT IN AN ORGANIZED HEALTH CARE EDUCATION/TRAINING PROGRAM

## 2022-11-20 PROCEDURE — 74011250637 HC RX REV CODE- 250/637: Performed by: NURSE PRACTITIONER

## 2022-11-20 PROCEDURE — 99231 SBSQ HOSP IP/OBS SF/LOW 25: CPT | Performed by: THORACIC SURGERY (CARDIOTHORACIC VASCULAR SURGERY)

## 2022-11-20 PROCEDURE — 74011250637 HC RX REV CODE- 250/637: Performed by: INTERNAL MEDICINE

## 2022-11-20 PROCEDURE — 82962 GLUCOSE BLOOD TEST: CPT

## 2022-11-20 PROCEDURE — 74011250636 HC RX REV CODE- 250/636: Performed by: INTERNAL MEDICINE

## 2022-11-20 PROCEDURE — 65270000029 HC RM PRIVATE

## 2022-11-20 RX ORDER — LEVOTHYROXINE SODIUM 125 UG/1
125 TABLET ORAL
Status: DISCONTINUED | OUTPATIENT
Start: 2022-11-21 | End: 2022-11-29

## 2022-11-20 RX ADMIN — LORAZEPAM 1 MG: 1 TABLET ORAL at 22:35

## 2022-11-20 RX ADMIN — Medication 36 UNITS: at 08:28

## 2022-11-20 RX ADMIN — HEPARIN SODIUM 14 UNITS/KG/HR: 10000 INJECTION, SOLUTION INTRAVENOUS at 14:58

## 2022-11-20 RX ADMIN — LEVOTHYROXINE SODIUM 125 MCG: 0.07 TABLET ORAL at 04:52

## 2022-11-20 RX ADMIN — Medication 3 UNITS: at 11:57

## 2022-11-20 RX ADMIN — Medication 7 UNITS: at 08:30

## 2022-11-20 RX ADMIN — SODIUM CHLORIDE, PRESERVATIVE FREE 10 ML: 5 INJECTION INTRAVENOUS at 04:05

## 2022-11-20 RX ADMIN — ASPIRIN 81 MG: 81 TABLET, COATED ORAL at 08:31

## 2022-11-20 RX ADMIN — NITROGLYCERIN 1 INCH: 20 OINTMENT TOPICAL at 13:24

## 2022-11-20 RX ADMIN — SODIUM CHLORIDE, PRESERVATIVE FREE 10 ML: 5 INJECTION INTRAVENOUS at 14:28

## 2022-11-20 RX ADMIN — SERTRALINE 100 MG: 50 TABLET, FILM COATED ORAL at 08:31

## 2022-11-20 RX ADMIN — BUSPIRONE HYDROCHLORIDE 15 MG: 5 TABLET ORAL at 22:35

## 2022-11-20 RX ADMIN — METOPROLOL SUCCINATE 25 MG: 25 TABLET, FILM COATED, EXTENDED RELEASE ORAL at 18:10

## 2022-11-20 RX ADMIN — INSULIN GLARGINE 70 UNITS: 100 INJECTION, SOLUTION SUBCUTANEOUS at 08:31

## 2022-11-20 RX ADMIN — Medication 36 UNITS: at 11:56

## 2022-11-20 RX ADMIN — BUSPIRONE HYDROCHLORIDE 15 MG: 5 TABLET ORAL at 08:31

## 2022-11-20 RX ADMIN — METOPROLOL SUCCINATE 25 MG: 25 TABLET, FILM COATED, EXTENDED RELEASE ORAL at 08:35

## 2022-11-20 RX ADMIN — PANTOPRAZOLE SODIUM 40 MG: 40 TABLET, DELAYED RELEASE ORAL at 08:37

## 2022-11-20 RX ADMIN — SODIUM CHLORIDE, PRESERVATIVE FREE 10 ML: 5 INJECTION INTRAVENOUS at 22:36

## 2022-11-20 RX ADMIN — INSULIN GLARGINE 70 UNITS: 100 INJECTION, SOLUTION SUBCUTANEOUS at 22:36

## 2022-11-20 RX ADMIN — BUSPIRONE HYDROCHLORIDE 15 MG: 5 TABLET ORAL at 14:27

## 2022-11-20 RX ADMIN — Medication 3 UNITS: at 16:53

## 2022-11-20 RX ADMIN — Medication 36 UNITS: at 16:52

## 2022-11-20 RX ADMIN — ROSUVASTATIN CALCIUM 40 MG: 40 TABLET, FILM COATED ORAL at 08:32

## 2022-11-20 NOTE — PROGRESS NOTES
Hospitalist Progress Note    NAME: Renetta Bryant   :  1980   MRN:  [id number]       Assessment / Plan:  Acute non-ST elevation myocardial infarction  History of coronary artery disease s/p multiple stents  -S/p coronary angiogram  noted to have patent LAD stent with mild to moderate diffuse disease and a jailed small diagonal branch with severe ostial narrowing  -Cath also showed evidence of severe aortic stenosis   -CT surgery will consider tissue aortic valve replacement early next week  -Underwent repeat angiogram today  and recommended to have AVR secondary to significant lesion in LAD  -Continue aspirin. Holding Plavix due to anticipated surgery. Continue heparin drip per cardiology  -Appreciate recommendations from cardiology and CT surgery    Severe aortic stenosis  Shortness of breath due to severe aortic stenosis  -Possible tissue valve replacement early next week    Dilated ascending aorta  -Noted on coronary angiogram  -CTA shows ectasia of the aorta at 3.7 cm but no aneurysm    Diabetes mellitus type 2 uncontrolled  -He is on insulin Toujeo 140 units daily  -Continue Lantus 70 units twice daily  -Increase lispro to 36 units 3 times daily  -He is on 36 units of insulin lispro 3 times daily at home  -Continue sliding scale insulin as well  -Continue diabetic diet  -Will need insulin drip at the time of surgery for effective blood sugar management  -Consider adjusting insulin further based on amount of sliding scale required today        Depression  Hypothyroidism  Hypertension  GERD  Dyslipidemia  -Continue home BuSpar, levothyroxine, metoprolol, PPI, crestor    Obstructive sleep apnea  -Not on CPAP as not able to tolerate due to anxiety     Code Status: Full code  Surrogate Decision Maker:  Mother     DVT Prophylaxis: Heparin drip        Baseline: From home, independent of ADLs      YOMAIRA:   Barriers: Evaluation by CT surgery, aortic valve surgery, cath           Subjective: Chief Complaint / Reason for Physician Visit  Does not report any chest pain or shortness of breath  Blood sugar still high        Review of Systems:  Symptom Y/N Comments  Symptom Y/N Comments   Fever/Chills    Chest Pain     Poor Appetite    Edema     Cough    Abdominal Pain     Sputum    Joint Pain     SOB/COLE    Pruritis/Rash     Nausea/vomit    Tolerating PT/OT     Diarrhea    Tolerating Diet     Constipation    Other       Could NOT obtain due to:      Objective:     VITALS:   Last 24hrs VS reviewed since prior progress note. Most recent are:  Patient Vitals for the past 24 hrs:   Temp Pulse Resp BP SpO2   11/19/22 1515 98 °F (36.7 °C) 84 16 122/70 96 %   11/19/22 1112 97.8 °F (36.6 °C) 73 16 136/85 96 %   11/19/22 0738 97.7 °F (36.5 °C) 72 16 119/68 98 %   11/19/22 0229 98.5 °F (36.9 °C) 73 16 120/67 95 %   11/18/22 2341 98.3 °F (36.8 °C) 72 16 118/65 96 %         Intake/Output Summary (Last 24 hours) at 11/19/2022 2102  Last data filed at 11/19/2022 1751  Gross per 24 hour   Intake 1450 ml   Output --   Net 1450 ml          I had a face to face encounter and independently examined this patient on 11/19/2022, as outlined below:  PHYSICAL EXAM:  General: WD, WN. Alert, cooperative, no acute distress    EENT:  EOMI. Anicteric sclerae. MMM  Resp:  CTA bilaterally, no wheezing or rales. No accessory muscle use  CV:  Regular  rhythm,  No edema, systolic murmur present  GI:  Soft, Non distended, Non tender. +Bowel sounds  Neurologic:  Alert and oriented X 3, normal speech,   Psych:   Not anxious nor agitated  Skin:  No rashes.   No jaundice    Reviewed most current lab test results and cultures  YES  Reviewed most current radiology test results   YES  Review and summation of old records today    NO  Reviewed patient's current orders and MAR    YES  PMH/SH reviewed - no change compared to H&P  ________________________________________________________________________  Care Plan discussed with:    Comments Patient y    Family      RN y    Care Manager     Consultant                        Multidiciplinary team rounds were held today with , nursing, pharmacist and clinical coordinator. Patient's plan of care was discussed; medications were reviewed and discharge planning was addressed. ________________________________________________________________________  Total NON critical care TIME:  35    Minutes    Total CRITICAL CARE TIME Spent:   Minutes non procedure based      Comments   >50% of visit spent in counseling and coordination of care     ________________________________________________________________________  Jennie Stanley MD     Procedures: see electronic medical records for all procedures/Xrays and details which were not copied into this note but were reviewed prior to creation of Plan. LABS:  I reviewed today's most current labs and imaging studies.   Pertinent labs include:  Recent Labs     11/19/22  0349 11/18/22  0307 11/17/22  0319   WBC 7.7 8.1 7.2   HGB 12.8 12.9 12.7   HCT 37.1 38.5 38.1    181 183       Recent Labs     11/19/22  0349 11/18/22  0307 11/17/22  0319   * 131* 133*   K 4.2 4.3 4.5   CL 97 98 98   CO2 27 27 26   * 353* 352*   BUN 16 18 17   CREA 1.05 1.02 1.11   CA 8.6 9.2 8.8         Signed: Jennie Stanley MD

## 2022-11-20 NOTE — PROGRESS NOTES
1930 - Bedside report from [facility]. SR  VS'S. Heparin infusing, therapeutic since this am.  No chest pain, + murmur, lungs clear, trace edema.   Up in chair.     - Bedside report to STEPHANIE EDWARDS.

## 2022-11-20 NOTE — PROGRESS NOTES
Hospitalist Progress Note    NAME: Pino Valenzuela   :  1980   MRN:  [id number]       Assessment / Plan:  Acute non-ST elevation myocardial infarction  History of coronary artery disease s/p multiple stents  -S/p coronary angiogram  noted to have patent LAD stent with mild to moderate diffuse disease and a jailed small diagonal branch with severe ostial narrowing  -Cath also showed evidence of severe aortic stenosis   -CT surgery to perform  tissue aortic valve replacement early next week  -Underwent repeat angiogram  and recommended to have AVR secondary to significant lesion in LAD. Plan on CABG  -Continue aspirin. Holding Plavix due to anticipated surgery. Continue heparin drip per cardiology  -Appreciate recommendations from cardiology and CT surgery    Severe aortic stenosis  Shortness of breath due to severe aortic stenosis  -tissue valve replacement early this week with CABG    Dilated ascending aorta  -Noted on coronary angiogram  -CTA shows ectasia of the aorta at 3.7 cm but no aneurysm    Diabetes mellitus type 1 uncontrolled  -He is on insulin Toujeo 140 units daily  -Continue Lantus 70 units twice daily  -Increased lispro to 36 units 3 times daily  -He is on 36 units of insulin lispro 3 times daily at home  -Continue sliding scale insulin as well  -Continue diabetic diet  -Will need insulin drip at the time of surgery for effective blood sugar management  -Consider adjusting insulin further based on amount of sliding scale required today        Depression  Hypothyroidism  Hypertension  GERD  Dyslipidemia  -Continue home BuSpar, levothyroxine, metoprolol, PPI, crestor    Obstructive sleep apnea  -Not on CPAP as not able to tolerate due to anxiety     Code Status: Full code  Surrogate Decision Maker:  Mother     DVT Prophylaxis: Heparin drip        Baseline: From home, independent of ADLs      YOMAIRA:   Barriers: aortic valve surgery, CABG           Subjective:     Chief Complaint / Reason for Physician Visit  Patient seen and examined mother at bedside. Patient currently has no complaints at this time. He states he has been walking around the room. Denies any chest pain or shortness of breath. Awaiting for surgery earlier this week. Review of Systems:  Symptom Y/N Comments  Symptom Y/N Comments   Fever/Chills    Chest Pain     Poor Appetite    Edema     Cough    Abdominal Pain     Sputum    Joint Pain     SOB/COLE    Pruritis/Rash     Nausea/vomit    Tolerating PT/OT     Diarrhea    Tolerating Diet     Constipation    Other       Could NOT obtain due to:      Objective:     VITALS:   Last 24hrs VS reviewed since prior progress note. Most recent are:  Patient Vitals for the past 24 hrs:   Temp Pulse Resp BP SpO2   11/20/22 0835 97.6 °F (36.4 °C) 75 -- 114/61 --   11/20/22 0812 97.6 °F (36.4 °C) 77 18 97/66 94 %   11/20/22 0435 98.3 °F (36.8 °C) 73 16 112/70 97 %   11/19/22 2250 98 °F (36.7 °C) 76 16 119/66 96 %   11/19/22 1958 98.1 °F (36.7 °C) 75 16 139/75 96 %   11/19/22 1515 98 °F (36.7 °C) 84 16 122/70 96 %   11/19/22 1112 97.8 °F (36.6 °C) 73 16 136/85 96 %         Intake/Output Summary (Last 24 hours) at 11/20/2022 1105  Last data filed at 11/20/2022 0435  Gross per 24 hour   Intake 1801.31 ml   Output --   Net 1801.31 ml          I had a face to face encounter and independently examined this patient on 11/20/2022, as outlined below:  PHYSICAL EXAM:  General: WD, WN. Alert, cooperative, no acute distress    EENT:  EOMI. Anicteric sclerae. MMM  Resp:  CTA bilaterally, no wheezing or rales. No accessory muscle use  CV:  Regular  rhythm,  No edema, systolic murmur present  GI:  Soft, Non distended, Non tender. +Bowel sounds  Neurologic:  Alert and oriented X 3, normal speech,   Psych:   Not anxious nor agitated  Skin:  No rashes.   No jaundice    Reviewed most current lab test results and cultures  YES  Reviewed most current radiology test results   YES  Review and summation of old records today    NO  Reviewed patient's current orders and MAR    YES  PMH/SH reviewed - no change compared to H&P  ________________________________________________________________________  Care Plan discussed with:    Comments   Patient y    Family      RN y    Care Manager     Consultant                        Multidiciplinary team rounds were held today with , nursing, pharmacist and clinical coordinator. Patient's plan of care was discussed; medications were reviewed and discharge planning was addressed. ________________________________________________________________________  Total NON critical care TIME:  35    Minutes    Total CRITICAL CARE TIME Spent:   Minutes non procedure based      Comments   >50% of visit spent in counseling and coordination of care     ________________________________________________________________________  Odalis Weber MD     Procedures: see electronic medical records for all procedures/Xrays and details which were not copied into this note but were reviewed prior to creation of Plan. LABS:  I reviewed today's most current labs and imaging studies.   Pertinent labs include:  Recent Labs     11/20/22  0430 11/19/22 0349 11/18/22  0307   WBC 8.9 7.7 8.1   HGB 13.2 12.8 12.9   HCT 38.4 37.1 38.5    179 181       Recent Labs     11/20/22  0430 11/19/22  0349 11/18/22  0307   * 130* 131*   K 4.1 4.2 4.3    97 98   CO2 25 27 27   * 374* 353*   BUN 17 16 18   CREA 0.97 1.05 1.02   CA 8.4* 8.6 9.2         Signed: Odalis Weber MD

## 2022-11-20 NOTE — PROGRESS NOTES
Cardiology Progress Note  11/20/2022 8:36 AM  Admit Date: 11/15/2022  Admit Diagnosis/CC: Acute non-Q wave non-ST elevation myocardial infarction (NSTEMI) ([facility] [address] [I21.4]  Subjective:   Patient reports:  Chest Pain:  [x] none,  consistent with [] non-cardiac  [] atypical  []  anginal chest pain             [x] none now    []  on-going  Dyspnea: [x] none  [] at rest  [] with exertion  [] improved  [] unchanged [] worsening  PND:       [x] none  [] overnight   [] current  Orthopnea: [x] none  [] improved  [] unchanged  [] worsening  Presyncope: [x] none  [] improved  [] unchanged  [] worsening  Ambulated in hallway without symptoms  [] Yes  Ambulated in room without symptoms  [x] Yes  ROS(2+other systems)   Hematuria: [] Yes  [x] No.   Dysuria: [] Yes  [x] No                                           Cough:       [] Yes  [x] No.   Sputum: [] Yes  [x] No                                            Hematochezia: [] Yes  [x] No.   Melena: [] Yes  [x] No                                            No change in family and social history from H&P/Consult note.     Current Facility-Administered Medications   Medication Dose Route Frequency    [START ON 11/21/2022] levothyroxine (SYNTHROID) tablet 125 mcg  125 mcg Oral 6am    insulin lispro (HUMALOG) injection 36 Units  36 Units SubCUTAneous TIDAC    sodium chloride (NS) flush 5-40 mL  5-40 mL IntraVENous Q8H    sodium chloride (NS) flush 5-40 mL  5-40 mL IntraVENous PRN    nitroglycerin (NITROBID) 2 % ointment 1 Inch  1 Inch Topical Q6H PRN    insulin glargine (LANTUS) injection 70 Units  70 Units SubCUTAneous DAILY    And    insulin glargine (LANTUS) injection 70 Units  70 Units SubCUTAneous QHS    heparin 25,000 units in D5W 250 ml infusion  8-25 Units/kg/hr IntraVENous TITRATE    heparin (porcine) 1,000 unit/mL injection 2,000 Units  2,000 Units IntraVENous PRN    Or    heparin (porcine) 1,000 unit/mL injection 4,000 Units  4,000 Units IntraVENous PRN    aspirin delayed-release tablet 81 mg  81 mg Oral DAILY    [Held by provider] clopidogreL (PLAVIX) tablet 75 mg  75 mg Oral DAILY    pantoprazole (PROTONIX) tablet 40 mg  40 mg Oral ACB    LORazepam (ATIVAN) tablet 1 mg  1 mg Oral Q8H PRN    metoprolol succinate (TOPROL-XL) XL tablet 25 mg  25 mg Oral BID    rosuvastatin (CRESTOR) tablet 40 mg  40 mg Oral DAILY    sertraline (ZOLOFT) tablet 100 mg  100 mg Oral DAILY    acetaminophen (TYLENOL) tablet 650 mg  650 mg Oral Q6H PRN    Or    acetaminophen (TYLENOL) suppository 650 mg  650 mg Rectal Q6H PRN    polyethylene glycol (MIRALAX) packet 17 g  17 g Oral DAILY PRN    ondansetron (ZOFRAN ODT) tablet 4 mg  4 mg Oral Q8H PRN    Or    ondansetron (ZOFRAN) injection 4 mg  4 mg IntraVENous Q6H PRN    insulin lispro (HUMALOG) injection   SubCUTAneous AC&HS    glucose chewable tablet 16 g  4 Tablet Oral PRN    glucagon (GLUCAGEN) injection 1 mg  1 mg IntraMUSCular PRN    dextrose 10% infusion 0-250 mL  0-250 mL IntraVENous PRN    busPIRone (BUSPAR) tablet 15 mg  15 mg Oral TID       Objective:    Physical Exam:  Last VS: Visit Vitals  /61   Pulse 75   Temp 98.3 °F (36.8 °C)   Resp 16   Ht 5' 9\" (1.753 m)   Wt 114.9 kg (253 lb 4.9 oz)   SpO2 97%   BMI 37.41 kg/m²      Temp (24hrs), Av °F (36.7 °C), Min:97.8 °F (36.6 °C), Max:98.3 °F (36.8 °C)    24 hr VS reviewed. General Appearance:   [x] well developed, well nourished,  [x] NAD. [] agitated   [] lethargic but arousable  [] obtunded   ENT, Palate:    [x] WNL   [] dry palate/MM        Respiratory:    [x] CTA bilateral  [] rales  [] rhonchi  [] normal resp effort      [] similar to yesterday   [] worse    [] improved   Cardiovascular:   [x] RRR   [] Irregular rate and rhythm   [x] Normal S1, S2   [x] No gallop or rub.    [] no murmur   [x] no new murmur  [] murmur c/w:   [x] no edema  RLE: []1+ []2+ [] 3+;  LLE: []1+ []2+ []3+      [] edema similar to yesterday   [] worse    [] improved   [x] normal JVP   [] Elevated JVP    [x] JVP similar to yesterday   [] worse    [] improved   [x] carotid upstroke unchanged   [x] abd aorta not palpated   [x] no stigmata of peripheral emboli   GI:    [x] abd soft, non-distented,bowel sounds present, no                     organomegaly appreciated   Skin:  Neuro:    Cath Site:  [x] warm and dry [] cold extremities   [x] A/O x 3, grossly non-focal      [] intact w/o hematoma or bruit; distal pulse unchanged.               Data Review:   Labs:    Recent Results (from the past 24 hour(s))   GLUCOSE, POC    Collection Time: 11/19/22 11:11 AM   Result Value Ref Range    Glucose (POC) 277 (H) 65 - 117 mg/dL    Performed by Juliocesar Pressley RN    GLUCOSE, POC    Collection Time: 11/19/22  3:13 PM   Result Value Ref Range    Glucose (POC) 85 65 - 117 mg/dL    Performed by Juliocesar Pressley RN    GLUCOSE, POC    Collection Time: 11/19/22  4:34 PM   Result Value Ref Range    Glucose (POC) 152 (H) 65 - 117 mg/dL    Performed by Robert Escamilla, POC    Collection Time: 11/19/22  9:56 PM   Result Value Ref Range    Glucose (POC) 158 (H) 65 - 117 mg/dL    Performed by Sherren Cornell C.    UNFRACTIONATED AND LMW HEPARIN    Collection Time: 11/20/22  4:30 AM   Result Value Ref Range    Heparin Xa,Unfrac. and LMW 0.30 IU/mL   CBC W/O DIFF    Collection Time: 11/20/22  4:30 AM   Result Value Ref Range    WBC 8.9 4.1 - 11.1 K/uL    RBC 4.67 4.10 - 5.70 M/uL    HGB 13.2 12.1 - 17.0 g/dL    HCT 38.4 36.6 - 50.3 %    MCV 82.2 80.0 - 99.0 FL    MCH 28.3 26.0 - 34.0 PG    MCHC 34.4 30.0 - 36.5 g/dL    RDW 14.1 11.5 - 14.5 %    PLATELET 607 729 - 061 K/uL    MPV 10.1 8.9 - 12.9 FL    NRBC 0.0 0  WBC    ABSOLUTE NRBC 0.00 0.00 - 1.94 K/uL   METABOLIC PANEL, BASIC    Collection Time: 11/20/22  4:30 AM   Result Value Ref Range    Sodium 135 (L) 136 - 145 mmol/L    Potassium 4.1 3.5 - 5.1 mmol/L    Chloride 103 97 - 108 mmol/L    CO2 25 21 - 32 mmol/L    Anion gap 7 5 - 15 mmol/L    Glucose 305 (H) 65 - 100 mg/dL BUN 17 6 - 20 MG/DL    Creatinine 0.97 0.70 - 1.30 MG/DL    BUN/Creatinine ratio 18 12 - 20      eGFR >60 >60 ml/min/1.73m2    Calcium 8.4 (L) 8.5 - 10.1 MG/DL   GLUCOSE, POC    Collection Time: 11/20/22  6:44 AM   Result Value Ref Range    Glucose (POC) 299 (H) 65 - 117 mg/dL    Performed by Lenny Molina. Provided Telemetry: [x] sinus  [] chronic afib   [] paroxysmal afib  [] NSVT      Assessment:     Active Problems:    Acute non-Q wave non-ST elevation myocardial infarction (NSTEMI) ([facility] [address] (11/15/2022)  S/W Dr. Makayla Harmon. . AVR + FFR. Plan:   - Cont heparin  - Cont asa  - Cont metoprolol  - Cont crestor    For CABG/AVR next week      For all other plans, see orders.    [x] High complexity decision making was performed

## 2022-11-20 NOTE — PROGRESS NOTES
[facility] FLOOR Progress Note    Admit Date: 11/15/2022  POD:  Day 2 Post-Op    Procedure:  Procedure(s):  LEFT HEART CATH / CORONARY ANGIOGRAPHY    Subjective/overnight events:   Pt seen with Dr. Kacey Marlow, in bed. In NSR, on RA, afebrile . VSS. No events overnight. No complaints at present . Reports that he had some CP yesterday; it's gone currently. Got SOB while trying to wash up yesterday as well.        Objective:     Visit Vitals  BP 97/66   Pulse 77   Temp 98.3 °F (36.8 °C)   Resp 16   Ht 5' 9\" (1.753 m)   Wt 253 lb 4.9 oz (114.9 kg)   SpO2 97%   BMI 37.41 kg/m²     Temp (24hrs), Av °F (36.7 °C), Min:97.8 °F (36.6 °C), Max:98.3 °F (36.8 °C)      Last 24hr Input/Output:    Intake/Output Summary (Last 24 hours) at 2022 0828  Last data filed at 2022 0435  Gross per 24 hour   Intake 1901.31 ml   Output --   Net 1901.31 ml        Chest tube output: N/A    EKG/Rhythm:   EKG Results       Procedure 720 Value Units Date/Time    EKG, 12 LEAD, INITIAL [[id number]] Collected: 22 1532    Order Status: Completed Updated: 22 1017     Ventricular Rate 77 BPM      Atrial Rate 77 BPM      P-R Interval 164 ms      QRS Duration 82 ms      Q-T Interval 370 ms      QTC Calculation (Bezet) 418 ms      Calculated P Axis 49 degrees      Calculated R Axis -6 degrees      Calculated T Axis 108 degrees      Diagnosis --     Normal sinus rhythm  Minimal voltage criteria for LVH, may be normal variant ( R in aVL )  Nonspecific ST and T wave abnormality  Poor R-wave Progression (consider lead placement or loss of anterior forces)    Confirmed by Yrn Rousseau MD, Andre Vasquez ([id number]) on 2022 10:17:13 AM      EKG, 12 LEAD, INITIAL [[id number]] Collected: 11/15/22 1721    Order Status: Completed Updated: 22 1136     Ventricular Rate 83 BPM      Atrial Rate 83 BPM      P-R Interval 160 ms      QRS Duration 84 ms      Q-T Interval 358 ms      QTC Calculation (Bezet) 420 ms      Calculated P Axis 50 degrees      Calculated R Axis 3 degrees      Calculated T Axis 126 degrees      Diagnosis --     Normal sinus rhythm  Left ventricular hypertrophy with repolarization abnormality  When compared with ECG of 14-SEP-2022 12:27,  No significant change was found  Confirmed by Naldo Billy ([id number]) on 11/16/2022 11:35:53 AM              Oxygen: RA    CXR:   CXR Results  (Last 48 hours)      None          CT Results  (Last 48 hours)      None          11/15/22    ECHO ADULT COMPLETE 11/16/2022 11/16/2022    Interpretation Summary    Left Ventricle: Low normal left ventricular systolic function with a visually estimated EF of 50 - 55%. Left ventricle size is normal. Increased wall thickness. Normal wall motion. Aortic Valve: Not well visualized. Possible bicuspid aortic valve. Thickened cusp. Calcified cusp with restricted leaflet motion. Moderate to Severe stenosis of the aortic valve. Mean gradient 39.9 mm Hg. Peak veolocity 4.05 m/sec. Technical qualifiers: Echo study was technically difficult due to patient's body habitus. Signed by: Blas Rivas MD on 11/16/2022  4:16 PM    11/15/22    CARDIAC PROCEDURE 11/16/2022 11/16/2022    Conclusion  Cardiac Cathetherization Note    PreOp Diagnosis / Indication:  Chest pain, sob. History of PCI to LAD, LCx and RCA    Procedure Performed:  1 LHC, Cors, Cineflouroscopy  2 AV study  3 LV gram      I have explained the nature of cardiac catheterization and possible percutaneous coronary intervention including risks and benefits of the procedure with the patient which include at least a 1:1000 risk for diagnostic procedure and 1/100 risk for percutaneous intervention.     Risks include but are not limited to risk of heart attack, stroke, vascular trauma requiring surgical repair or transfusion, abnormal heart rhythm requiring defibrillation or pacemaker, need for intraaortic balloon pump support, renal dysfunction requiring dialysis, exacerbated gastrointestinal bleeding, allergic response to medications requiring ventilatory support, emergent cardiac surgery and even death. They also understand the need for medical compliance - particularly if stenting is required - mandating continued daily consumption of aspirin and plavix or other antiplatelet therapy. Differences between medicated stent versus bare metal stent reviewed with patient. The patient expresses an understanding and verbally consents. They also understand plans for either radial or femoral access - with unique risks to both vascular beds including arterial occlusion, vascular trauma, hematoma and need for vascular surgery. I have answered all of their questions regarding the procedure and they are willing to proceed. Procedure status: [x]  Elective  [] Urgent  [] Emergent  Operators:  Williams Gramajo  Assistants: None  Access:   [x]  RIGHT Radial  []  LEFT Radial  [] RCFA  []  LCFA  []  RCFV  []  LCFV  Catheters: TIG, JR  Closure: [x]  TR Band  []  Angioseal  []  Perclose  []  Manual Compression  Tubes/Drains:  [x] No tubes or drains remain from this procedure  [] Other:  Estimated Blood Loss: Minimal  Specimens: None  Sedation: Moderate conscious sedation with IV fentany & versed, local anesthesia with 1% lidocaine. This was performed by non-anesthesia personnel and I provided direct supervision to a trained independent observer. Time under moderate sedation: 1hr 10  Min  Patient age:  36 y.o. Complications:  [x] None  [] Other:  Patient Condition at the end of the procedure:  [x] Stable  [] Other:    Hemodynamics: Ao: 111/74  LV:  163/17, EDP 30, There was 50-60 mm Hg gradient during pull back    Simultaneous pressure measurement of LV and Ao performed  Mean gradient across Aortic valve was 50.5 mm Hg suggesting severe AS. Cors:    Dominance: [x] Right  [] Left  [] Mixed    LM: Very short. No significant disease.     LAD: Large caliber vessel that wraps around the apex which has patent stent, 30% proximal narrowing, with up 40-50% narrowing in proximal segment. D1: Small to moderate caliber vessel with 90% ostial narrowing, jailed from LAD stent. LCX: Large caliber vessel without significant stenosis, patent LCx to OM stent. OM1: Moderate caliber vessel with patent stent in mid segment and diffuse disease distally. 60-70% narrowing in distal vessel. RCA: Moderate vessel with patent stent and 40-50% narrowing in mid segment. PDA: Moderate caliber vessel without significant stenosis. PLB: Small caliber vessel without significant stenosis. LV angiography:  EF: 50-55%  Wall motion:  NA  MR: None minimal      Findings/PostOp Diagnosis:  1. Mild to moderate disease in major epicardial artery  2. Severe ostial disease of diagonal branch (jailed from LAD stent)  3. Severe aortic stenosis with mean gradient 51 mm Hg (by simultaneous pressure measurement)  4. Mildly dilated ascending aorta  5. Elevated left sided filling pressures    Recommendations:  1. Echocardiogram to assess aortic stenosis and AV morphology  2. Routine post procedure & access site care  3. Continue aggressive medical management and risk factor modification    Sridhar Bender MD    Signed by: Sridhar Bender MD on 11/16/2022  4:54 PM        PFTs (11/16/22):      Carotid US:   11/16/22- preliminary report: There is mild stenosis in the right ICA (<50%). There is mild stenosis in the left ICA (<50%). The right vertebral is antegrade. The left vertebral is antegrade.       Admission Weight: Last Weight   Weight: 257 lb 15 oz (117 kg) Weight: 253 lb 4.9 oz (114.9 kg)       EXAM:  General: Awake, alert, oriented  and no acute distress   Lungs:    Diminished to ausculation bilaterally   Incision:  N/A   Heart:   grade II systolic ejection murmur right of the sternal border, 2nd ICS   Abdomen:    Soft, non-distended, non-tender and obese   Extremities:  Non-pitting edema BLE   Neurologic:  Gross motor and sensory apparatus intact         Activity: OOB TID, ambulate     Diet:  Cardiac    Lab Data Reviewed:   Recent Labs     11/20/22  0644 11/20/22  0430   WBC  --  8.9   HGB  --  13.2   HCT  --  38.4   PLT  --  187   NA  --  135*   K  --  4.1   BUN  --  17   CREA  --  0.97   GLU  --  305*   GLUCPOC 299*  --          Assessment:     Active Problems:    Acute non-Q wave non-ST elevation myocardial infarction (NSTEMI) ([facility] [address] (11/15/2022)           Plan/Recommendations/Medical Decision Making:     Severe symptomatic AS, most likely a bicuspid valve. Plan for bioprosthetic AVR with CABG next week. NSTEMI, in the setting of known CAD s/p stents, s/p C 11/16/22 and 11/18/22- plan for CABG with AVR. On ASA, Plavix, BB, heparin gtt; continue. Dilated ascending aorta. No aneurysm noted on CT as above. SOB in the setting of AS. . Repeat echo and PFT results as above. Plan for SAVR as above. HTN. On ACEI, BB PTA; currently only on BB. XOL. Continue statin. WALLY , not on CPAP. Was unable to tolerate due to anxiety; he has been working on this with Sleep Medicine. OP follow-up. DMII with hyperglycemia. On Toujeo at home. Currently on Lantus and SSI per primary team. Repeat A1C 9.0- will need to start insulin gtt day prior to surgery. Hypothyroidism. On Synthroid PTA; continue. Repeat TSH 1.96. GERD. Continue PPI. Anxiety and depression. On Buspar, Zoloft; continue. Supportive care. Obesity. BMI 37.37%. Diet and exercise counseling. DVT ppx- Heparin gtt  Dispo- Remain on stepdown    Time spent: 15 minutes    Signed By: Martha Olvera NP    Addendum: Pt seen and examined. Agree with NP Fidencio Dobbs note. No cp but mentioned significant sob with walking to the bathroom. Reviewed plan for next week, including single vessel bypass to LAD.      Time spent: 20 minutes

## 2022-11-21 ENCOUNTER — ANESTHESIA EVENT (OUTPATIENT)
Dept: CARDIOTHORACIC SURGERY | Age: 42
DRG: 162 | End: 2022-11-21
Payer: COMMERCIAL

## 2022-11-21 LAB
ADMINISTERED INITIALS, ADMINIT: NORMAL
D50 ADMINISTERED, D50ADM: 0 ML
D50 ORDER, D50ORD: 0 ML
GLUCOSE BLD STRIP.AUTO-MCNC: 199 MG/DL (ref 65–117)
GLUCOSE BLD STRIP.AUTO-MCNC: 205 MG/DL (ref 65–117)
GLUCOSE BLD STRIP.AUTO-MCNC: 227 MG/DL (ref 65–117)
GLUCOSE BLD STRIP.AUTO-MCNC: 235 MG/DL (ref 65–117)
GLUCOSE BLD STRIP.AUTO-MCNC: 235 MG/DL (ref 65–117)
GLUCOSE BLD STRIP.AUTO-MCNC: 239 MG/DL (ref 65–117)
GLUCOSE BLD STRIP.AUTO-MCNC: 247 MG/DL (ref 65–117)
GLUCOSE BLD STRIP.AUTO-MCNC: 255 MG/DL (ref 65–117)
GLUCOSE BLD STRIP.AUTO-MCNC: 281 MG/DL (ref 65–117)
GLUCOSE BLD STRIP.AUTO-MCNC: 354 MG/DL (ref 65–117)
GLUCOSE BLD STRIP.AUTO-MCNC: 383 MG/DL (ref 65–117)
GLUCOSE BLD STRIP.AUTO-MCNC: 391 MG/DL (ref 65–117)
GLUCOSE, GLC: 199 MG/DL
GLUCOSE, GLC: 205 MG/DL
GLUCOSE, GLC: 227 MG/DL
GLUCOSE, GLC: 235 MG/DL
GLUCOSE, GLC: 235 MG/DL
GLUCOSE, GLC: 239 MG/DL
GLUCOSE, GLC: 247 MG/DL
GLUCOSE, GLC: 255 MG/DL
GLUCOSE, GLC: 281 MG/DL
HIGH TARGET, HITG: 180 MG/DL
INSULIN ADMINSTERED, INSADM: 10.5 UNITS/HOUR
INSULIN ADMINSTERED, INSADM: 12.5 UNITS/HOUR
INSULIN ADMINSTERED, INSADM: 14.5 UNITS/HOUR
INSULIN ADMINSTERED, INSADM: 15.5 UNITS/HOUR
INSULIN ADMINSTERED, INSADM: 15.6 UNITS/HOUR
INSULIN ADMINSTERED, INSADM: 3.6 UNITS/HOUR
INSULIN ADMINSTERED, INSADM: 5 UNITS/HOUR
INSULIN ADMINSTERED, INSADM: 7.5 UNITS/HOUR
INSULIN ADMINSTERED, INSADM: 8.8 UNITS/HOUR
INSULIN ORDER, INSORD: 10.5 UNITS/HOUR
INSULIN ORDER, INSORD: 12.5 UNITS/HOUR
INSULIN ORDER, INSORD: 14.5 UNITS/HOUR
INSULIN ORDER, INSORD: 15.5 UNITS/HOUR
INSULIN ORDER, INSORD: 15.6 UNITS/HOUR
INSULIN ORDER, INSORD: 3.6 UNITS/HOUR
INSULIN ORDER, INSORD: 5 UNITS/HOUR
INSULIN ORDER, INSORD: 7.5 UNITS/HOUR
INSULIN ORDER, INSORD: 8.8 UNITS/HOUR
LOW TARGET, LOT: 140 MG/DL
MINUTES UNTIL NEXT BG, NBG: 60 MIN
MULTIPLIER, MUL: 0.02
MULTIPLIER, MUL: 0.03
MULTIPLIER, MUL: 0.04
MULTIPLIER, MUL: 0.05
MULTIPLIER, MUL: 0.06
MULTIPLIER, MUL: 0.07
MULTIPLIER, MUL: 0.08
MULTIPLIER, MUL: 0.09
MULTIPLIER, MUL: 0.1
ORDER INITIALS, ORDINIT: NORMAL
SERVICE CMNT-IMP: ABNORMAL
UFH PPP CHRO-ACNC: 0.3 IU/ML

## 2022-11-21 PROCEDURE — 74011000250 HC RX REV CODE- 250

## 2022-11-21 PROCEDURE — 74011636637 HC RX REV CODE- 636/637

## 2022-11-21 PROCEDURE — 86923 COMPATIBILITY TEST ELECTRIC: CPT

## 2022-11-21 PROCEDURE — 74011250637 HC RX REV CODE- 250/637: Performed by: INTERNAL MEDICINE

## 2022-11-21 PROCEDURE — 77010033678 HC OXYGEN DAILY

## 2022-11-21 PROCEDURE — 65270000029 HC RM PRIVATE

## 2022-11-21 PROCEDURE — 74011250636 HC RX REV CODE- 250/636: Performed by: INTERNAL MEDICINE

## 2022-11-21 PROCEDURE — 74011000250 HC RX REV CODE- 250: Performed by: STUDENT IN AN ORGANIZED HEALTH CARE EDUCATION/TRAINING PROGRAM

## 2022-11-21 PROCEDURE — 74011636637 HC RX REV CODE- 636/637: Performed by: INTERNAL MEDICINE

## 2022-11-21 PROCEDURE — 85520 HEPARIN ASSAY: CPT

## 2022-11-21 PROCEDURE — 82962 GLUCOSE BLOOD TEST: CPT

## 2022-11-21 PROCEDURE — 99024 POSTOP FOLLOW-UP VISIT: CPT | Performed by: THORACIC SURGERY (CARDIOTHORACIC VASCULAR SURGERY)

## 2022-11-21 PROCEDURE — 86900 BLOOD TYPING SEROLOGIC ABO: CPT

## 2022-11-21 PROCEDURE — 74011000258 HC RX REV CODE- 258

## 2022-11-21 PROCEDURE — 36415 COLL VENOUS BLD VENIPUNCTURE: CPT

## 2022-11-21 PROCEDURE — 74011250637 HC RX REV CODE- 250/637

## 2022-11-21 RX ORDER — DEXTROSE MONOHYDRATE 100 MG/ML
0-250 INJECTION, SOLUTION INTRAVENOUS AS NEEDED
Status: DISCONTINUED | OUTPATIENT
Start: 2022-11-21 | End: 2022-11-22

## 2022-11-21 RX ORDER — MUPIROCIN 20 MG/G
OINTMENT TOPICAL EVERY 12 HOURS
Status: DISCONTINUED | OUTPATIENT
Start: 2022-11-21 | End: 2022-11-23

## 2022-11-21 RX ORDER — MAGNESIUM SULFATE HEPTAHYDRATE 40 MG/ML
2 INJECTION, SOLUTION INTRAVENOUS ONCE
Status: DISCONTINUED | OUTPATIENT
Start: 2022-11-22 | End: 2022-11-22

## 2022-11-21 RX ORDER — INSULIN GLARGINE 100 [IU]/ML
43 INJECTION, SOLUTION SUBCUTANEOUS DAILY
Status: DISCONTINUED | OUTPATIENT
Start: 2022-11-21 | End: 2022-11-21

## 2022-11-21 RX ORDER — DESMOPRESSIN ACETATE 4 UG/ML
2 INJECTION, SOLUTION INTRAVENOUS; SUBCUTANEOUS ONCE
Status: COMPLETED | OUTPATIENT
Start: 2022-11-22 | End: 2022-11-22

## 2022-11-21 RX ORDER — INSULIN GLARGINE 100 [IU]/ML
42 INJECTION, SOLUTION SUBCUTANEOUS
Status: DISCONTINUED | OUTPATIENT
Start: 2022-11-21 | End: 2022-11-21

## 2022-11-21 RX ORDER — INSULIN GLARGINE 100 [IU]/ML
85 INJECTION, SOLUTION SUBCUTANEOUS DAILY
Status: DISCONTINUED | OUTPATIENT
Start: 2022-11-21 | End: 2022-11-21 | Stop reason: SDUPTHER

## 2022-11-21 RX ORDER — NOREPINEPHRINE BITARTRATE/D5W 8 MG/250ML
2-16 PLASTIC BAG, INJECTION (ML) INTRAVENOUS
Status: DISCONTINUED | OUTPATIENT
Start: 2022-11-22 | End: 2022-11-22

## 2022-11-21 RX ORDER — DEXMEDETOMIDINE HYDROCHLORIDE 4 UG/ML
.1-1.5 INJECTION, SOLUTION INTRAVENOUS
Status: DISCONTINUED | OUTPATIENT
Start: 2022-11-22 | End: 2022-11-23

## 2022-11-21 RX ORDER — INSULIN GLARGINE 100 [IU]/ML
85 INJECTION, SOLUTION SUBCUTANEOUS
Status: DISCONTINUED | OUTPATIENT
Start: 2022-11-21 | End: 2022-11-21 | Stop reason: SDUPTHER

## 2022-11-21 RX ORDER — PROTAMINE SULFATE 10 MG/ML
250 INJECTION, SOLUTION INTRAVENOUS
Status: DISCONTINUED | OUTPATIENT
Start: 2022-11-22 | End: 2022-11-22

## 2022-11-21 RX ORDER — INSULIN GLARGINE 100 [IU]/ML
43 INJECTION, SOLUTION SUBCUTANEOUS
Status: DISCONTINUED | OUTPATIENT
Start: 2022-11-21 | End: 2022-11-21

## 2022-11-21 RX ORDER — NITROGLYCERIN 20 MG/100ML
0-20 INJECTION INTRAVENOUS
Status: DISCONTINUED | OUTPATIENT
Start: 2022-11-22 | End: 2022-11-22

## 2022-11-21 RX ORDER — INSULIN GLARGINE 100 [IU]/ML
42 INJECTION, SOLUTION SUBCUTANEOUS DAILY
Status: DISCONTINUED | OUTPATIENT
Start: 2022-11-21 | End: 2022-11-21

## 2022-11-21 RX ORDER — POTASSIUM CHLORIDE 29.8 MG/ML
20 INJECTION INTRAVENOUS ONCE
Status: DISCONTINUED | OUTPATIENT
Start: 2022-11-22 | End: 2022-11-22

## 2022-11-21 RX ORDER — CHLORHEXIDINE GLUCONATE 1.2 MG/ML
15 RINSE ORAL EVERY 12 HOURS
Status: DISCONTINUED | OUTPATIENT
Start: 2022-11-21 | End: 2022-11-23 | Stop reason: SDUPTHER

## 2022-11-21 RX ORDER — INSULIN LISPRO 100 [IU]/ML
INJECTION, SOLUTION INTRAVENOUS; SUBCUTANEOUS
Status: DISCONTINUED | OUTPATIENT
Start: 2022-11-21 | End: 2022-11-22

## 2022-11-21 RX ORDER — AMIODARONE HYDROCHLORIDE 200 MG/1
400 TABLET ORAL 2 TIMES DAILY
Status: DISCONTINUED | OUTPATIENT
Start: 2022-11-21 | End: 2022-11-22

## 2022-11-21 RX ORDER — DOBUTAMINE HYDROCHLORIDE 200 MG/100ML
0-10 INJECTION INTRAVENOUS
Status: DISCONTINUED | OUTPATIENT
Start: 2022-11-22 | End: 2022-11-23

## 2022-11-21 RX ORDER — INSULIN GLARGINE 100 [IU]/ML
1-50 INJECTION, SOLUTION SUBCUTANEOUS
Status: DISCONTINUED | OUTPATIENT
Start: 2022-11-21 | End: 2022-11-22

## 2022-11-21 RX ORDER — DOPAMINE HYDROCHLORIDE 320 MG/100ML
0-20 INJECTION, SOLUTION INTRAVENOUS
Status: DISCONTINUED | OUTPATIENT
Start: 2022-11-22 | End: 2022-11-22

## 2022-11-21 RX ADMIN — SERTRALINE 100 MG: 50 TABLET, FILM COATED ORAL at 08:48

## 2022-11-21 RX ADMIN — HEPARIN SODIUM 14 UNITS/KG/HR: 10000 INJECTION, SOLUTION INTRAVENOUS at 07:31

## 2022-11-21 RX ADMIN — Medication 8 UNITS: at 08:52

## 2022-11-21 RX ADMIN — LEVOTHYROXINE SODIUM 125 MCG: 0.12 TABLET ORAL at 06:22

## 2022-11-21 RX ADMIN — METOPROLOL SUCCINATE 25 MG: 25 TABLET, FILM COATED, EXTENDED RELEASE ORAL at 17:33

## 2022-11-21 RX ADMIN — LORAZEPAM 1 MG: 1 TABLET ORAL at 15:40

## 2022-11-21 RX ADMIN — SODIUM CHLORIDE, PRESERVATIVE FREE 10 ML: 5 INJECTION INTRAVENOUS at 15:40

## 2022-11-21 RX ADMIN — INSULIN GLARGINE 42 UNITS: 100 INJECTION, SOLUTION SUBCUTANEOUS at 08:54

## 2022-11-21 RX ADMIN — Medication 3 UNITS: at 16:49

## 2022-11-21 RX ADMIN — Medication 36 UNITS: at 12:02

## 2022-11-21 RX ADMIN — BUSPIRONE HYDROCHLORIDE 15 MG: 5 TABLET ORAL at 22:46

## 2022-11-21 RX ADMIN — MUPIROCIN: 20 OINTMENT TOPICAL at 22:46

## 2022-11-21 RX ADMIN — BUSPIRONE HYDROCHLORIDE 15 MG: 5 TABLET ORAL at 15:37

## 2022-11-21 RX ADMIN — PANTOPRAZOLE SODIUM 40 MG: 40 TABLET, DELAYED RELEASE ORAL at 08:48

## 2022-11-21 RX ADMIN — SODIUM CHLORIDE 3.9 UNITS/HR: 9 INJECTION, SOLUTION INTRAVENOUS at 15:25

## 2022-11-21 RX ADMIN — ROSUVASTATIN CALCIUM 40 MG: 40 TABLET, FILM COATED ORAL at 08:48

## 2022-11-21 RX ADMIN — Medication 12 UNITS: at 12:03

## 2022-11-21 RX ADMIN — SODIUM CHLORIDE, PRESERVATIVE FREE 10 ML: 5 INJECTION INTRAVENOUS at 06:23

## 2022-11-21 RX ADMIN — METOPROLOL SUCCINATE 25 MG: 25 TABLET, FILM COATED, EXTENDED RELEASE ORAL at 08:48

## 2022-11-21 RX ADMIN — AMIODARONE HYDROCHLORIDE 400 MG: 200 TABLET ORAL at 17:35

## 2022-11-21 RX ADMIN — SODIUM CHLORIDE, PRESERVATIVE FREE 10 ML: 5 INJECTION INTRAVENOUS at 22:46

## 2022-11-21 RX ADMIN — 0.12% CHLORHEXIDINE GLUCONATE 15 ML: 1.2 RINSE ORAL at 22:46

## 2022-11-21 RX ADMIN — INSULIN GLARGINE 43 UNITS: 100 INJECTION, SOLUTION SUBCUTANEOUS at 08:55

## 2022-11-21 RX ADMIN — ASPIRIN 81 MG: 81 TABLET, COATED ORAL at 08:48

## 2022-11-21 RX ADMIN — Medication 36 UNITS: at 08:48

## 2022-11-21 RX ADMIN — BUSPIRONE HYDROCHLORIDE 15 MG: 5 TABLET ORAL at 08:48

## 2022-11-21 NOTE — PROGRESS NOTES
dermatitisCardiology Progress Note  11/21/2022 9:19 AM  Admit Date: 11/15/2022  Admit Diagnosis/CC: Acute non-Q wave non-ST elevation myocardial infarction (NSTEMI) ([facility] [address] [I21.4]  Subjective:   Patient reports:  Chest Pain:  [x] none,  consistent with [] non-cardiac  [] atypical  []  anginal chest pain             [x] none now    []  on-going  Dyspnea: [x] none  [] at rest  [] with exertion  [] improved  [] unchanged [] worsening  PND:       [x] none  [] overnight   [] current  Orthopnea: [x] none  [] improved  [] unchanged  [] worsening  Presyncope: [x] none  [] improved  [] unchanged  [] worsening  Ambulated in hallway without symptoms  [] Yes  Ambulated in room without symptoms  [x] Yes  ROS(2+other systems)   Hematuria: [] Yes  [x] No.   Dysuria: [] Yes  [x] No                                           Cough:       [] Yes  [x] No.   Sputum: [] Yes  [x] No                                            Hematochezia: [] Yes  [x] No.   Melena: [] Yes  [x] No                                            No change in family and social history from H&P/Consult note.     Current Facility-Administered Medications   Medication Dose Route Frequency    insulin glargine (LANTUS) injection 42 Units  42 Units SubCUTAneous DAILY    And    insulin glargine (LANTUS) injection 43 Units  43 Units SubCUTAneous DAILY    insulin glargine (LANTUS) injection 42 Units  42 Units SubCUTAneous QHS    And    insulin glargine (LANTUS) injection 43 Units  43 Units SubCUTAneous QHS    levothyroxine (SYNTHROID) tablet 125 mcg  125 mcg Oral 6am    insulin lispro (HUMALOG) injection 36 Units  36 Units SubCUTAneous TIDAC    sodium chloride (NS) flush 5-40 mL  5-40 mL IntraVENous Q8H    sodium chloride (NS) flush 5-40 mL  5-40 mL IntraVENous PRN    nitroglycerin (NITROBID) 2 % ointment 1 Inch  1 Inch Topical Q6H PRN    heparin 25,000 units in D5W 250 ml infusion  8-25 Units/kg/hr IntraVENous TITRATE    heparin (porcine) 1,000 unit/mL injection 2,000 Units 2,000 Units IntraVENous PRN    Or    heparin (porcine) 1,000 unit/mL injection 4,000 Units  4,000 Units IntraVENous PRN    aspirin delayed-release tablet 81 mg  81 mg Oral DAILY    [Held by provider] clopidogreL (PLAVIX) tablet 75 mg  75 mg Oral DAILY    pantoprazole (PROTONIX) tablet 40 mg  40 mg Oral ACB    LORazepam (ATIVAN) tablet 1 mg  1 mg Oral Q8H PRN    metoprolol succinate (TOPROL-XL) XL tablet 25 mg  25 mg Oral BID    rosuvastatin (CRESTOR) tablet 40 mg  40 mg Oral DAILY    sertraline (ZOLOFT) tablet 100 mg  100 mg Oral DAILY    acetaminophen (TYLENOL) tablet 650 mg  650 mg Oral Q6H PRN    Or    acetaminophen (TYLENOL) suppository 650 mg  650 mg Rectal Q6H PRN    polyethylene glycol (MIRALAX) packet 17 g  17 g Oral DAILY PRN    ondansetron (ZOFRAN ODT) tablet 4 mg  4 mg Oral Q8H PRN    Or    ondansetron (ZOFRAN) injection 4 mg  4 mg IntraVENous Q6H PRN    insulin lispro (HUMALOG) injection   SubCUTAneous AC&HS    glucose chewable tablet 16 g  4 Tablet Oral PRN    glucagon (GLUCAGEN) injection 1 mg  1 mg IntraMUSCular PRN    dextrose 10% infusion 0-250 mL  0-250 mL IntraVENous PRN    busPIRone (BUSPAR) tablet 15 mg  15 mg Oral TID       Objective:    Physical Exam:  Last VS: Visit Vitals  BP (!) 137/57 (BP 1 Location: Left upper arm, BP Patient Position: At rest)   Pulse 75   Temp 97.8 °F (36.6 °C)   Resp 17   Ht 5' 9\" (1.753 m)   Wt 114.9 kg (253 lb 4.9 oz)   SpO2 96%   BMI 37.41 kg/m²    Temp (24hrs), Av.1 °F (36.7 °C), Min:97.7 °F (36.5 °C), Max:98.5 °F (36.9 °C)    24 hr VS reviewed. General Appearance:   [x] well developed, well nourished,  [x] NAD. [] agitated   [] lethargic but arousable  [] obtunded   ENT, Palate:    [x] WNL   [] dry palate/MM        Respiratory:    [x] CTA bilateral  [] rales  [] rhonchi  [] normal resp effort      [] similar to yesterday   [] worse    [] improved   Cardiovascular:   [x] RRR   [] Irregular rate and rhythm   [x] Normal S1, S2   [x] No gallop or rub.    [] no murmur   [x] no new murmur  [] murmur c/w:   [x] no edema  RLE: []1+ []2+ [] 3+;  LLE: []1+ []2+ []3+      [] edema similar to yesterday   [] worse    [] improved   [x] normal JVP   [] Elevated JVP    [x] JVP similar to yesterday   [] worse    [] improved   [x] carotid upstroke unchanged   [x] abd aorta not palpated   [x] no stigmata of peripheral emboli   GI:    [x] abd soft, non-distented,bowel sounds present, no                     organomegaly appreciated   Skin:  Neuro:    Cath Site:  [x] warm and dry [] cold extremities   [x] A/O x 3, grossly non-focal      [] intact w/o hematoma or bruit; distal pulse unchanged.               Data Review:   Labs:    Recent Results (from the past 24 hour(s))   GLUCOSE, POC    Collection Time: 11/20/22 10:18 AM   Result Value Ref Range    Glucose (POC) 209 (H) 65 - 117 mg/dL    Performed by Janna Farias RN    GLUCOSE, POC    Collection Time: 11/20/22 11:11 AM   Result Value Ref Range    Glucose (POC) 196 (H) 65 - 117 mg/dL    Performed by Janna Farias RN    GLUCOSE, POC    Collection Time: 11/20/22  4:29 PM   Result Value Ref Range    Glucose (POC) 185 (H) 65 - 117 mg/dL    Performed by Janna Farias RN    GLUCOSE, POC    Collection Time: 11/20/22  9:09 PM   Result Value Ref Range    Glucose (POC) 181 (H) 65 - 117 mg/dL    Performed by Serge Graham RN    UNFRACTIONATED AND LMW HEPARIN    Collection Time: 11/21/22  4:07 AM   Result Value Ref Range    Heparin Xa,Unfrac. and LMW 0.30 IU/mL   GLUCOSE, POC    Collection Time: 11/21/22  6:48 AM   Result Value Ref Range    Glucose (POC) 354 (H) 65 - 117 mg/dL    Performed by Serge Graham RN    GLUCOSE, POC    Collection Time: 11/21/22  6:50 AM   Result Value Ref Range    Glucose (POC) 383 (H) 65 - 117 mg/dL    Performed by Serge Graham RN        Provided Telemetry: [x] sinus  [] chronic afib   [] paroxysmal afib  [] NSVT      Assessment:     Active Problems:    Acute non-Q wave non-ST elevation myocardial infarction (NSTEMI) ([facility] [address] (11/15/2022)        Plan: Aortic Valve Disease  unchanged    Surgical consult  For OR. Still very SOB with exertion. Severe AS. For all other plans, see orders.    [x] High complexity decision making was performed

## 2022-11-21 NOTE — PROGRESS NOTES
Hospitalist Progress Note    NAME: Carmelina Carpenter   :  1980   MRN:  [id number]       Assessment / Plan:  Acute non-ST elevation myocardial infarction  History of coronary artery disease s/p multiple stents  -S/p coronary angiogram  noted to have patent LAD stent with mild to moderate diffuse disease and a jailed small diagonal branch with severe ostial narrowing  -Cath also showed evidence of severe aortic stenosis   -CT surgery to perform  tissue aortic valve replacement early next week  -Underwent repeat angiogram  and recommended to have AVR secondary to significant lesion in LAD. Plan on CABG tomorrow   -Continue aspirin. Holding Plavix due to anticipated surgery. Continue heparin drip per cardiology  -Appreciate recommendations from cardiology and CT surgery    Severe aortic stenosis  Shortness of breath due to severe aortic stenosis  -tissue valve replacement early this week with CABG    Dilated ascending aorta  -Noted on coronary angiogram  -CTA shows ectasia of the aorta at 3.7 cm but no aneurysm    Diabetes mellitus type 1 uncontrolled  -He is on insulin Toujeo 140 units daily  -Continue Lantus 70 units twice daily  -Increased lispro to 36 units 3 times daily  -He is on 36 units of insulin lispro 3 times daily at home  -Continue sliding scale insulin as well  -Continue diabetic diet  -Will need insulin drip at the time of surgery for effective blood sugar management  -Consider adjusting insulin further based on amount of sliding scale required today  -Patient will be placed on insulin gtt shortly in preparation for surgery tomorrow      Depression  Hypothyroidism  Hypertension  GERD  Dyslipidemia  -Continue home BuSpar, levothyroxine, metoprolol, PPI, crestor    Obstructive sleep apnea  -Not on CPAP as not able to tolerate due to anxiety     Code Status: Full code  Surrogate Decision Maker:  Mother     DVT Prophylaxis: Heparin drip        Baseline: From home, independent of ADLs      YOMAIRA: 11/23  Barriers: aortic valve surgery, CABG           Subjective:     Chief Complaint / Reason for Physician Visit  Patient seen and examined mother at bedside. Patient currently has no complaints. States that he is going to the OR tomorrow morning with cardiothoracic surgery. Denies any chest pain or shortness of breath. All questions addressed. Awaiting for surgery earlier this week. Review of Systems:  Symptom Y/N Comments  Symptom Y/N Comments   Fever/Chills    Chest Pain     Poor Appetite    Edema     Cough    Abdominal Pain     Sputum    Joint Pain     SOB/COLE    Pruritis/Rash     Nausea/vomit    Tolerating PT/OT     Diarrhea    Tolerating Diet     Constipation    Other       Could NOT obtain due to:      Objective:     VITALS:   Last 24hrs VS reviewed since prior progress note. Most recent are:  Patient Vitals for the past 24 hrs:   Temp Pulse Resp BP SpO2   11/21/22 1110 97.9 °F (36.6 °C) 72 17 138/63 97 %   11/21/22 0730 97.8 °F (36.6 °C) 75 17 (!) 137/57 96 %   11/21/22 0415 97.7 °F (36.5 °C) 78 18 123/79 95 %   11/20/22 2240 98.1 °F (36.7 °C) 84 18 113/61 98 %   11/20/22 2010 98.2 °F (36.8 °C) 81 18 114/76 98 %   11/20/22 1810 -- 87 -- 122/60 --   11/20/22 1502 98.2 °F (36.8 °C) 77 16 120/69 98 %       No intake or output data in the 24 hours ending 11/21/22 1433       I had a face to face encounter and independently examined this patient on 11/21/2022, as outlined below:  PHYSICAL EXAM:  General: WD, WN. Alert, cooperative, no acute distress    EENT:  EOMI. Anicteric sclerae. MMM  Resp:  CTA bilaterally, no wheezing or rales. No accessory muscle use  CV:  Regular  rhythm,  No edema, systolic murmur present  GI:  Soft, Non distended, Non tender. +Bowel sounds  Neurologic:  Alert and oriented X 3, normal speech,   Psych:   Not anxious nor agitated  Skin:  No rashes.   No jaundice    Reviewed most current lab test results and cultures  YES  Reviewed most current radiology test results   YES  Review and summation of old records today    NO  Reviewed patient's current orders and MAR    YES  PMH/SH reviewed - no change compared to H&P  ________________________________________________________________________  Care Plan discussed with:    Comments   Patient y    Family      RN y    Care Manager     Consultant                        Multidiciplinary team rounds were held today with , nursing, pharmacist and clinical coordinator. Patient's plan of care was discussed; medications were reviewed and discharge planning was addressed. ________________________________________________________________________  Total NON critical care TIME:  35    Minutes    Total CRITICAL CARE TIME Spent:   Minutes non procedure based      Comments   >50% of visit spent in counseling and coordination of care     ________________________________________________________________________  Petrona Rodriguez MD     Procedures: see electronic medical records for all procedures/Xrays and details which were not copied into this note but were reviewed prior to creation of Plan. LABS:  I reviewed today's most current labs and imaging studies.   Pertinent labs include:  Recent Labs     11/20/22 0430 11/19/22  0349   WBC 8.9 7.7   HGB 13.2 12.8   HCT 38.4 37.1    179       Recent Labs     11/20/22 0430 11/19/22  0349   * 130*   K 4.1 4.2    97   CO2 25 27   * 374*   BUN 17 16   CREA 0.97 1.05   CA 8.4* 8.6         Signed: Petrona Rodriguez MD

## 2022-11-21 NOTE — PROGRESS NOTES
[facility] FLOOR Progress Note    Admit Date: 11/15/2022  POD:  Day 3 Post-Op    Procedure:  Procedure(s):  LEFT HEART CATH / CORONARY ANGIOGRAPHY    Subjective/overnight events:   Pt seen in bed eating breakfast. In NSR, on RA, afebrile . VSS. No events overnight. No complaints .          Objective:     Visit Vitals  /79 (BP 1 Location: Left upper arm, BP Patient Position: At rest)   Pulse 78   Temp 97.7 °F (36.5 °C)   Resp 18   Ht 5' 9\" (1.753 m)   Wt 253 lb 4.9 oz (114.9 kg)   SpO2 95%   BMI 37.41 kg/m²     Temp (24hrs), Av °F (36.7 °C), Min:97.6 °F (36.4 °C), Max:98.5 °F (36.9 °C)      Last 24hr Input/Output:  No intake or output data in the 24 hours ending 22 0548       Chest tube output: N/A    EKG/Rhythm:   EKG Results       Procedure 720 Value Units Date/Time    EKG, 12 LEAD, INITIAL [[id number]] Collected: 22 1532    Order Status: Completed Updated: 22 1017     Ventricular Rate 77 BPM      Atrial Rate 77 BPM      P-R Interval 164 ms      QRS Duration 82 ms      Q-T Interval 370 ms      QTC Calculation (Bezet) 418 ms      Calculated P Axis 49 degrees      Calculated R Axis -6 degrees      Calculated T Axis 108 degrees      Diagnosis --     Normal sinus rhythm  Minimal voltage criteria for LVH, may be normal variant ( R in aVL )  Nonspecific ST and T wave abnormality  Poor R-wave Progression (consider lead placement or loss of anterior forces)    Confirmed by Edi Bryan MD, Raf Russell ([id number]) on 2022 10:17:13 AM      EKG, 12 LEAD, INITIAL [[id number]] Collected: 11/15/22 1728    Order Status: Completed Updated: 22 1136     Ventricular Rate 83 BPM      Atrial Rate 83 BPM      P-R Interval 160 ms      QRS Duration 84 ms      Q-T Interval 358 ms      QTC Calculation (Bezet) 420 ms      Calculated P Axis 50 degrees      Calculated R Axis 3 degrees      Calculated T Axis 126 degrees      Diagnosis --     Normal sinus rhythm  Left ventricular hypertrophy with repolarization abnormality  When compared with ECG of 14-SEP-2022 12:27,  No significant change was found  Confirmed by Pervis Agusto ([id number]) on 11/16/2022 11:35:53 AM              Oxygen: RA    CXR:   CXR Results  (Last 48 hours)      None          CT Results  (Last 48 hours)      None          11/15/22    ECHO ADULT COMPLETE 11/16/2022 11/16/2022    Interpretation Summary    Left Ventricle: Low normal left ventricular systolic function with a visually estimated EF of 50 - 55%. Left ventricle size is normal. Increased wall thickness. Normal wall motion. Aortic Valve: Not well visualized. Possible bicuspid aortic valve. Thickened cusp. Calcified cusp with restricted leaflet motion. Moderate to Severe stenosis of the aortic valve. Mean gradient 39.9 mm Hg. Peak veolocity 4.05 m/sec. Technical qualifiers: Echo study was technically difficult due to patient's body habitus. Signed by: Hank Herrera MD on 11/16/2022  4:16 PM    11/15/22    CARDIAC PROCEDURE 11/16/2022 11/16/2022    Conclusion  Cardiac Cathetherization Note    PreOp Diagnosis / Indication:  Chest pain, sob. History of PCI to LAD, LCx and RCA    Procedure Performed:  1 LHC, Cors, Cineflouroscopy  2 AV study  3 LV gram      I have explained the nature of cardiac catheterization and possible percutaneous coronary intervention including risks and benefits of the procedure with the patient which include at least a 1:1000 risk for diagnostic procedure and 1/100 risk for percutaneous intervention. Risks include but are not limited to risk of heart attack, stroke, vascular trauma requiring surgical repair or transfusion, abnormal heart rhythm requiring defibrillation or pacemaker, need for intraaortic balloon pump support, renal dysfunction requiring dialysis, exacerbated gastrointestinal bleeding, allergic response to medications requiring ventilatory support, emergent cardiac surgery and even death.  They also understand the need for medical compliance - particularly if stenting is required - mandating continued daily consumption of aspirin and plavix or other antiplatelet therapy. Differences between medicated stent versus bare metal stent reviewed with patient. The patient expresses an understanding and verbally consents. They also understand plans for either radial or femoral access - with unique risks to both vascular beds including arterial occlusion, vascular trauma, hematoma and need for vascular surgery. I have answered all of their questions regarding the procedure and they are willing to proceed. Procedure status: [x]  Elective  [] Urgent  [] Emergent  Operators:  Spenser Brown  Assistants: None  Access:   [x]  RIGHT Radial  []  LEFT Radial  [] RCFA  []  LCFA  []  RCFV  []  LCFV  Catheters: TIG, JR  Closure: [x]  TR Band  []  Angioseal  []  Perclose  []  Manual Compression  Tubes/Drains:  [x] No tubes or drains remain from this procedure  [] Other:  Estimated Blood Loss: Minimal  Specimens: None  Sedation: Moderate conscious sedation with IV fentany & versed, local anesthesia with 1% lidocaine. This was performed by non-anesthesia personnel and I provided direct supervision to a trained independent observer. Time under moderate sedation: 1hr 10  Min  Patient age:  36 y.o. Complications:  [x] None  [] Other:  Patient Condition at the end of the procedure:  [x] Stable  [] Other:    Hemodynamics: Ao: 111/74  LV:  163/17, EDP 30, There was 50-60 mm Hg gradient during pull back    Simultaneous pressure measurement of LV and Ao performed  Mean gradient across Aortic valve was 50.5 mm Hg suggesting severe AS. Cors:    Dominance: [x] Right  [] Left  [] Mixed    LM: Very short. No significant disease. LAD: Large caliber vessel that wraps around the apex which has patent stent, 30% proximal narrowing, with up 40-50% narrowing in proximal segment. D1: Small to moderate caliber vessel with 90% ostial narrowing, jailed from LAD stent.     LCX: Large caliber vessel without significant stenosis, patent LCx to OM stent. OM1: Moderate caliber vessel with patent stent in mid segment and diffuse disease distally. 60-70% narrowing in distal vessel. RCA: Moderate vessel with patent stent and 40-50% narrowing in mid segment. PDA: Moderate caliber vessel without significant stenosis. PLB: Small caliber vessel without significant stenosis. LV angiography:  EF: 50-55%  Wall motion:  NA  MR: None minimal      Findings/PostOp Diagnosis:  1. Mild to moderate disease in major epicardial artery  2. Severe ostial disease of diagonal branch (jailed from LAD stent)  3. Severe aortic stenosis with mean gradient 51 mm Hg (by simultaneous pressure measurement)  4. Mildly dilated ascending aorta  5. Elevated left sided filling pressures    Recommendations:  1. Echocardiogram to assess aortic stenosis and AV morphology  2. Routine post procedure & access site care  3. Continue aggressive medical management and risk factor modification    Kavon Nicole MD    Signed by: Kavon Nicole MD on 11/16/2022  4:54 PM        PFTs (11/16/22):      Carotid US:   11/16/22- preliminary report: There is mild stenosis in the right ICA (<50%). There is mild stenosis in the left ICA (<50%). The right vertebral is antegrade. The left vertebral is antegrade.       Admission Weight: Last Weight   Weight: 257 lb 15 oz (117 kg) Weight: 253 lb 4.9 oz (114.9 kg)       EXAM:  General: Awake, alert, oriented  and no acute distress   Lungs:    Diminished to ausculation bilaterally   Incision:  N/A   Heart:   grade II systolic ejection murmur right of the sternal border, 2nd ICS   Abdomen:    Soft, non-distended, non-tender and obese   Extremities:  Non-pitting edema BLE   Neurologic:  Gross motor and sensory apparatus intact         Activity: OOB TID, ambulate     Diet:  Cardiac    Lab Data Reviewed:   Recent Labs     11/20/22  2109 11/20/22  0644 11/20/22  0430   WBC  --   --  8.9   HGB  -- --  13.2   HCT  --   --  38.4   PLT  --   --  187   NA  --   --  135*   K  --   --  4.1   BUN  --   --  17   CREA  --   --  0.97   GLU  --   --  305*   GLUCPOC 181*   < >  --     < > = values in this interval not displayed. Assessment:     Active Problems:    Acute non-Q wave non-ST elevation myocardial infarction (NSTEMI) ([address] (11/15/2022)           Plan/Recommendations/Medical Decision Making:     Severe symptomatic AS, most likely a bicuspid valve. Plan for bioprosthetic AVR with CABG this week, Tuesday or Wednesday; final plans per Dr. Blayne Sanchez. NSTEMI, in the setting of known CAD s/p stents, s/p [facility] 11/16/22 and 11/18/22- plan for CABG with AVR. On ASA, Plavix, BB, heparin gtt; continue. Dilated ascending aorta. No aneurysm noted on CT as above. SOB in the setting of AS. . Repeat echo and PFT results as above. Plan for SAVR as above. HTN. On ACEI, BB PTA; currently only on BB. XOL. Continue statin. WALLY , not on CPAP. Was unable to tolerate due to anxiety; he has been working on this with Sleep Medicine. OP follow-up. DMII with hyperglycemia. On Toujeo at home. Currently on Lantus and SSI per primary team. Repeat A1C 9.0- will need to start insulin gtt day prior to surgery. Hypothyroidism. On Synthroid PTA; continue. Repeat TSH 1.96. GERD. Continue PPI. Anxiety and depression. On Buspar, Zoloft; continue. Supportive care. Obesity. BMI 37.41%. Diet and exercise counseling. DVT ppx- Heparin gtt  Dispo- Remain on stepdown    Time spent: 15 minutes    Signed By: Kyleigh Campbell NP    Addendum: Pt seen and examined. Agree with NP Alexi Mckeon note. Opening for OR available tmrw. Plavix has been washed out. Will plan for tissue AVR and LIMA to LAD. Discussed with pt NPO at midnight and hep off at midnight, and insulin infusion. Patient understood and wished to proceed tmrw. Updated Dr. Chriss Rose.     Time spent: 25 minutes

## 2022-11-22 ENCOUNTER — APPOINTMENT (OUTPATIENT)
Dept: GENERAL RADIOLOGY | Age: 42
End: 2022-11-22
Attending: PHYSICIAN ASSISTANT
Payer: COMMERCIAL

## 2022-11-22 ENCOUNTER — ANESTHESIA (OUTPATIENT)
Dept: CARDIOTHORACIC SURGERY | Age: 42
DRG: 162 | End: 2022-11-22
Payer: COMMERCIAL

## 2022-11-22 ENCOUNTER — HOSPITAL ENCOUNTER (OUTPATIENT)
Dept: NON INVASIVE DIAGNOSTICS | Age: 42
Discharge: HOME | End: 2022-11-22
Attending: THORACIC SURGERY (CARDIOTHORACIC VASCULAR SURGERY)

## 2022-11-22 PROBLEM — I25.10 CAD (CORONARY ARTERY DISEASE): Status: ACTIVE | Noted: 2022-11-22

## 2022-11-22 PROBLEM — Z95.1 S/P CABG X 1: Status: ACTIVE | Noted: 2022-11-22

## 2022-11-22 PROBLEM — Z95.2 S/P AVR (AORTIC VALVE REPLACEMENT) AND AORTOPLASTY: Status: ACTIVE | Noted: 2022-11-22

## 2022-11-22 PROBLEM — I35.0 AORTIC STENOSIS: Status: ACTIVE | Noted: 2022-11-22

## 2022-11-22 LAB
ACUTE KIDNEY INJURY RISK SCORE, AKIR: 0.33 (ref 0–0.3)
ADMINISTERED INITIALS, ADMINIT: NORMAL
ALBUMIN SERPL-MCNC: 3.6 G/DL (ref 3.5–5)
ALBUMIN/GLOB SERPL: 1.5 {RATIO} (ref 1.1–2.2)
ALP SERPL-CCNC: 78 U/L (ref 45–117)
ALT SERPL-CCNC: 109 U/L (ref 12–78)
ANION GAP SERPL CALC-SCNC: 8 MMOL/L (ref 5–15)
APTT PPP: 26.4 SEC (ref 22.1–31)
ARTERIAL PATENCY WRIST A: ABNORMAL
ARTERIAL PATENCY WRIST A: ABNORMAL
AST SERPL-CCNC: 172 U/L (ref 15–37)
BASE DEFICIT BLDA-SCNC: 4.8 MMOL/L
BASE DEFICIT BLDA-SCNC: 5.2 MMOL/L
BDY SITE: ABNORMAL
BDY SITE: ABNORMAL
BILIRUB SERPL-MCNC: 1.9 MG/DL (ref 0.2–1)
BREATHS.SPONTANEOUS ON VENT: 30
BUN SERPL-MCNC: 22 MG/DL (ref 6–20)
BUN/CREAT SERPL: 14 (ref 12–20)
CA-I BLD-SCNC: 1.3 MMOL/L (ref 1.13–1.32)
CALCIUM SERPL-MCNC: 9.1 MG/DL (ref 8.5–10.1)
CHLORIDE SERPL-SCNC: 110 MMOL/L (ref 97–108)
CO2 SERPL-SCNC: 23 MMOL/L (ref 21–32)
CREAT SERPL-MCNC: 1.62 MG/DL (ref 0.7–1.3)
D50 ADMINISTERED, D50ADM: 0 ML
D50 ORDER, D50ORD: 0 ML
FIO2 ON VENT: 50 %
FIO2 ON VENT: 80 %
GAS FLOW.O2 SETTING OXYMISER: 14 L/MIN
GLOBULIN SER CALC-MCNC: 2.4 G/DL (ref 2–4)
GLUCOSE BLD STRIP.AUTO-MCNC: 104 MG/DL (ref 65–117)
GLUCOSE BLD STRIP.AUTO-MCNC: 109 MG/DL (ref 65–117)
GLUCOSE BLD STRIP.AUTO-MCNC: 127 MG/DL (ref 65–117)
GLUCOSE BLD STRIP.AUTO-MCNC: 135 MG/DL (ref 65–117)
GLUCOSE BLD STRIP.AUTO-MCNC: 141 MG/DL (ref 65–117)
GLUCOSE BLD STRIP.AUTO-MCNC: 145 MG/DL (ref 65–117)
GLUCOSE BLD STRIP.AUTO-MCNC: 149 MG/DL (ref 65–117)
GLUCOSE BLD STRIP.AUTO-MCNC: 163 MG/DL (ref 65–117)
GLUCOSE BLD STRIP.AUTO-MCNC: 165 MG/DL (ref 65–117)
GLUCOSE BLD STRIP.AUTO-MCNC: 169 MG/DL (ref 65–117)
GLUCOSE BLD STRIP.AUTO-MCNC: 171 MG/DL (ref 65–117)
GLUCOSE BLD STRIP.AUTO-MCNC: 185 MG/DL (ref 65–117)
GLUCOSE BLD STRIP.AUTO-MCNC: 205 MG/DL (ref 65–117)
GLUCOSE BLD STRIP.AUTO-MCNC: 81 MG/DL (ref 65–117)
GLUCOSE SERPL-MCNC: 176 MG/DL (ref 65–100)
GLUCOSE, GLC: 109 MG/DL
GLUCOSE, GLC: 121 MG/DL
GLUCOSE, GLC: 127 MG/DL
GLUCOSE, GLC: 135 MG/DL
GLUCOSE, GLC: 135 MG/DL
GLUCOSE, GLC: 141 MG/DL
GLUCOSE, GLC: 145 MG/DL
GLUCOSE, GLC: 149 MG/DL
GLUCOSE, GLC: 160 MG/DL
GLUCOSE, GLC: 163 MG/DL
GLUCOSE, GLC: 165 MG/DL
GLUCOSE, GLC: 169 MG/DL
GLUCOSE, GLC: 171 MG/DL
GLUCOSE, GLC: 171 MG/DL
GLUCOSE, GLC: 184 MG/DL
GLUCOSE, GLC: 185 MG/DL
GLUCOSE, GLC: 187 MG/DL
GLUCOSE, GLC: 205 MG/DL
GLUCOSE, GLC: 206 MG/DL
GLUCOSE, GLC: 209 MG/DL
GLUCOSE, GLC: 228 MG/DL
GLUCOSE, GLC: 81 MG/DL
HCO3 BLDA-SCNC: 21 MMOL/L (ref 22–26)
HCO3 BLDA-SCNC: 23 MMOL/L (ref 22–26)
HCT VFR BLD AUTO: 34.4 % (ref 36.6–50.3)
HCT VFR BLD AUTO: 34.5 % (ref 36.6–50.3)
HGB BLD-MCNC: 11.7 G/DL (ref 12.1–17)
HGB BLD-MCNC: 11.8 G/DL (ref 12.1–17)
HIGH TARGET, HITG: 130 MG/DL
HIGH TARGET, HITG: 140 MG/DL
HIGH TARGET, HITG: 180 MG/DL
HISTORY CHECKED?,CKHIST: NORMAL
INR PPP: 1.1 (ref 0.9–1.1)
INSULIN ADMINSTERED, INSADM: 1.6 UNITS/HOUR
INSULIN ADMINSTERED, INSADM: 1.8 UNITS/HOUR
INSULIN ADMINSTERED, INSADM: 10.2 UNITS/HOUR
INSULIN ADMINSTERED, INSADM: 10.7 UNITS/HOUR
INSULIN ADMINSTERED, INSADM: 12.1 UNITS/HOUR
INSULIN ADMINSTERED, INSADM: 12.2 UNITS/HOUR
INSULIN ADMINSTERED, INSADM: 12.4 UNITS/HOUR
INSULIN ADMINSTERED, INSADM: 12.7 UNITS/HOUR
INSULIN ADMINSTERED, INSADM: 12.8 UNITS/HOUR
INSULIN ADMINSTERED, INSADM: 12.8 UNITS/HOUR
INSULIN ADMINSTERED, INSADM: 13 UNITS/HOUR
INSULIN ADMINSTERED, INSADM: 13.4 UNITS/HOUR
INSULIN ADMINSTERED, INSADM: 13.4 UNITS/HOUR
INSULIN ADMINSTERED, INSADM: 13.5 UNITS/HOUR
INSULIN ADMINSTERED, INSADM: 13.7 UNITS/HOUR
INSULIN ADMINSTERED, INSADM: 15 UNITS/HOUR
INSULIN ADMINSTERED, INSADM: 15.3 UNITS/HOUR
INSULIN ADMINSTERED, INSADM: 16 UNITS/HOUR
INSULIN ADMINSTERED, INSADM: 4 UNITS/HOUR
INSULIN ADMINSTERED, INSADM: 4.7 UNITS/HOUR
INSULIN ADMINSTERED, INSADM: 5.6 UNITS/HOUR
INSULIN ADMINSTERED, INSADM: 7.4 UNITS/HOUR
INSULIN ORDER, INSORD: 1.6 UNITS/HOUR
INSULIN ORDER, INSORD: 1.8 UNITS/HOUR
INSULIN ORDER, INSORD: 10.2 UNITS/HOUR
INSULIN ORDER, INSORD: 10.7 UNITS/HOUR
INSULIN ORDER, INSORD: 12.1 UNITS/HOUR
INSULIN ORDER, INSORD: 12.2 UNITS/HOUR
INSULIN ORDER, INSORD: 12.4 UNITS/HOUR
INSULIN ORDER, INSORD: 12.7 UNITS/HOUR
INSULIN ORDER, INSORD: 12.8 UNITS/HOUR
INSULIN ORDER, INSORD: 12.8 UNITS/HOUR
INSULIN ORDER, INSORD: 13 UNITS/HOUR
INSULIN ORDER, INSORD: 13.4 UNITS/HOUR
INSULIN ORDER, INSORD: 13.4 UNITS/HOUR
INSULIN ORDER, INSORD: 13.5 UNITS/HOUR
INSULIN ORDER, INSORD: 13.7 UNITS/HOUR
INSULIN ORDER, INSORD: 15 UNITS/HOUR
INSULIN ORDER, INSORD: 15.3 UNITS/HOUR
INSULIN ORDER, INSORD: 16 UNITS/HOUR
INSULIN ORDER, INSORD: 4 UNITS/HOUR
INSULIN ORDER, INSORD: 4.7 UNITS/HOUR
INSULIN ORDER, INSORD: 5.6 UNITS/HOUR
INSULIN ORDER, INSORD: 7.4 UNITS/HOUR
LOW TARGET, LOT: 100 MG/DL
LOW TARGET, LOT: 140 MG/DL
LOW TARGET, LOT: 95 MG/DL
MAGNESIUM SERPL-MCNC: 2.7 MG/DL (ref 1.6–2.4)
MAGNESIUM SERPL-MCNC: 3 MG/DL (ref 1.6–2.4)
MINUTES UNTIL NEXT BG, NBG: 60 MIN
MULTIPLIER, MUL: 0.03
MULTIPLIER, MUL: 0.04
MULTIPLIER, MUL: 0.05
MULTIPLIER, MUL: 0.06
MULTIPLIER, MUL: 0.07
MULTIPLIER, MUL: 0.08
MULTIPLIER, MUL: 0.08
MULTIPLIER, MUL: 0.09
MULTIPLIER, MUL: 0.1
MULTIPLIER, MUL: 0.1
MULTIPLIER, MUL: 0.11
MULTIPLIER, MUL: 0.11
MULTIPLIER, MUL: 0.12
MULTIPLIER, MUL: 0.13
MULTIPLIER, MUL: 0.14
MULTIPLIER, MUL: 0.15
MULTIPLIER, MUL: 0.16
MULTIPLIER, MUL: 0.17
MULTIPLIER, MUL: 0.18
MULTIPLIER, MUL: 0.18
ORDER INITIALS, ORDINIT: NORMAL
PCO2 BLDA: 43 MMHG (ref 35–45)
PCO2 BLDA: 51 MMHG (ref 35–45)
PEEP RESPIRATORY: 5 CM[H2O]
PEEP RESPIRATORY: 5 CM[H2O]
PH BLDA: 7.26 [PH] (ref 7.35–7.45)
PH BLDA: 7.31 [PH] (ref 7.35–7.45)
PO2 BLDA: 84 MMHG (ref 80–100)
PO2 BLDA: 87 MMHG (ref 80–100)
POTASSIUM SERPL-SCNC: 4.8 MMOL/L (ref 3.5–5.1)
PRESSURE SUPPORT SETTING VENT: 5 CM[H2O]
PROT SERPL-MCNC: 6 G/DL (ref 6.4–8.2)
PROTHROMBIN TIME: 11.2 SEC (ref 9–11.1)
SAO2 % BLD: 95 % (ref 92–97)
SAO2 % BLD: 95 % (ref 92–97)
SAO2% DEVICE SAO2% SENSOR NAME: ABNORMAL
SAO2% DEVICE SAO2% SENSOR NAME: ABNORMAL
SERVICE CMNT-IMP: ABNORMAL
SERVICE CMNT-IMP: NORMAL
SODIUM SERPL-SCNC: 141 MMOL/L (ref 136–145)
SPECIMEN SITE: ABNORMAL
SPECIMEN SITE: ABNORMAL
THERAPEUTIC RANGE,PTTT: NORMAL SECS (ref 58–77)
VENTILATION MODE VENT: ABNORMAL
VT SETTING VENT: 400 ML

## 2022-11-22 PROCEDURE — 77030018729 HC ELECTRD DEFIB PAD CARD -B: Performed by: THORACIC SURGERY (CARDIOTHORACIC VASCULAR SURGERY)

## 2022-11-22 PROCEDURE — 77030013798 HC KT TRNSDUC PRSSR EDWD -B: Performed by: ANESTHESIOLOGY

## 2022-11-22 PROCEDURE — 76060000044 HC ANESTHESIA 6.5 TO 7 HR: Performed by: THORACIC SURGERY (CARDIOTHORACIC VASCULAR SURGERY)

## 2022-11-22 PROCEDURE — 77030011235 HC DRN PERCRD SUMP MEDT -B: Performed by: THORACIC SURGERY (CARDIOTHORACIC VASCULAR SURGERY)

## 2022-11-22 PROCEDURE — 2709999900 HC NON-CHARGEABLE SUPPLY: Performed by: THORACIC SURGERY (CARDIOTHORACIC VASCULAR SURGERY)

## 2022-11-22 PROCEDURE — 77030002933 HC SUT MCRYL J&J -A: Performed by: THORACIC SURGERY (CARDIOTHORACIC VASCULAR SURGERY)

## 2022-11-22 PROCEDURE — 74011000250 HC RX REV CODE- 250: Performed by: NURSE ANESTHETIST, CERTIFIED REGISTERED

## 2022-11-22 PROCEDURE — P9047 ALBUMIN (HUMAN), 25%, 50ML: HCPCS | Performed by: THORACIC SURGERY (CARDIOTHORACIC VASCULAR SURGERY)

## 2022-11-22 PROCEDURE — 71045 X-RAY EXAM CHEST 1 VIEW: CPT

## 2022-11-22 PROCEDURE — 77030013079 HC BLNKT BAIR HGGR 3M -A: Performed by: ANESTHESIOLOGY

## 2022-11-22 PROCEDURE — 88311 DECALCIFY TISSUE: CPT

## 2022-11-22 PROCEDURE — 74011000258 HC RX REV CODE- 258: Performed by: PHYSICIAN ASSISTANT

## 2022-11-22 PROCEDURE — 93005 ELECTROCARDIOGRAM TRACING: CPT

## 2022-11-22 PROCEDURE — 77030002996 HC SUT SLK J&J -A: Performed by: THORACIC SURGERY (CARDIOTHORACIC VASCULAR SURGERY)

## 2022-11-22 PROCEDURE — 77030037878 HC DRSG MEPILEX >48IN BORD MOLN -B: Performed by: THORACIC SURGERY (CARDIOTHORACIC VASCULAR SURGERY)

## 2022-11-22 PROCEDURE — 74011250636 HC RX REV CODE- 250/636: Performed by: NURSE ANESTHETIST, CERTIFIED REGISTERED

## 2022-11-22 PROCEDURE — 77030003010 HC SUT SURG STL J&J -B: Performed by: THORACIC SURGERY (CARDIOTHORACIC VASCULAR SURGERY)

## 2022-11-22 PROCEDURE — 74011000250 HC RX REV CODE- 250: Performed by: PHYSICIAN ASSISTANT

## 2022-11-22 PROCEDURE — 76010000119 HC CV SURG 6.5 TO 7 HR: Performed by: THORACIC SURGERY (CARDIOTHORACIC VASCULAR SURGERY)

## 2022-11-22 PROCEDURE — 88305 TISSUE EXAM BY PATHOLOGIST: CPT

## 2022-11-22 PROCEDURE — 88304 TISSUE EXAM BY PATHOLOGIST: CPT

## 2022-11-22 PROCEDURE — 74011250636 HC RX REV CODE- 250/636: Performed by: PHYSICIAN ASSISTANT

## 2022-11-22 PROCEDURE — 77030003029 HC SUT VCRL J&J -B: Performed by: THORACIC SURGERY (CARDIOTHORACIC VASCULAR SURGERY)

## 2022-11-22 PROCEDURE — 74011636637 HC RX REV CODE- 636/637: Performed by: NURSE PRACTITIONER

## 2022-11-22 PROCEDURE — 77030041469 HC VLV AORT INSPIRIS EDWD -K4: Performed by: THORACIC SURGERY (CARDIOTHORACIC VASCULAR SURGERY)

## 2022-11-22 PROCEDURE — C1768 GRAFT, VASCULAR: HCPCS | Performed by: THORACIC SURGERY (CARDIOTHORACIC VASCULAR SURGERY)

## 2022-11-22 PROCEDURE — 80053 COMPREHEN METABOLIC PANEL: CPT

## 2022-11-22 PROCEDURE — 77030041244 HC CBL PACE EXT TEMP REMG -B: Performed by: THORACIC SURGERY (CARDIOTHORACIC VASCULAR SURGERY)

## 2022-11-22 PROCEDURE — 65620000000 HC RM CCU GENERAL

## 2022-11-22 PROCEDURE — 02100Z9 BYPASS CORONARY ARTERY, ONE ARTERY FROM LEFT INTERNAL MAMMARY, OPEN APPROACH: ICD-10-PCS | Performed by: THORACIC SURGERY (CARDIOTHORACIC VASCULAR SURGERY)

## 2022-11-22 PROCEDURE — 77030013532 HC SEAL FBRN TISSL RDYTUSE 4ML BAXT -C: Performed by: THORACIC SURGERY (CARDIOTHORACIC VASCULAR SURGERY)

## 2022-11-22 PROCEDURE — 77030010797: Performed by: THORACIC SURGERY (CARDIOTHORACIC VASCULAR SURGERY)

## 2022-11-22 PROCEDURE — 77010033678 HC OXYGEN DAILY

## 2022-11-22 PROCEDURE — 74011000250 HC RX REV CODE- 250: Performed by: THORACIC SURGERY (CARDIOTHORACIC VASCULAR SURGERY)

## 2022-11-22 PROCEDURE — 77030002524 HC INSTR CLMP FGRTY EDWD -B: Performed by: THORACIC SURGERY (CARDIOTHORACIC VASCULAR SURGERY)

## 2022-11-22 PROCEDURE — C1751 CATH, INF, PER/CENT/MIDLINE: HCPCS | Performed by: ANESTHESIOLOGY

## 2022-11-22 PROCEDURE — 74011250636 HC RX REV CODE- 250/636: Performed by: THORACIC SURGERY (CARDIOTHORACIC VASCULAR SURGERY)

## 2022-11-22 PROCEDURE — 74011250636 HC RX REV CODE- 250/636: Performed by: NURSE PRACTITIONER

## 2022-11-22 PROCEDURE — 65610000006 HC RM INTENSIVE CARE

## 2022-11-22 PROCEDURE — 74011000258 HC RX REV CODE- 258: Performed by: THORACIC SURGERY (CARDIOTHORACIC VASCULAR SURGERY)

## 2022-11-22 PROCEDURE — 85018 HEMOGLOBIN: CPT

## 2022-11-22 PROCEDURE — 82803 BLOOD GASES ANY COMBINATION: CPT

## 2022-11-22 PROCEDURE — 33533 CABG ARTERIAL SINGLE: CPT | Performed by: THORACIC SURGERY (CARDIOTHORACIC VASCULAR SURGERY)

## 2022-11-22 PROCEDURE — C1713 ANCHOR/SCREW BN/BN,TIS/BN: HCPCS | Performed by: THORACIC SURGERY (CARDIOTHORACIC VASCULAR SURGERY)

## 2022-11-22 PROCEDURE — 74011250636 HC RX REV CODE- 250/636: Performed by: ANESTHESIOLOGY

## 2022-11-22 PROCEDURE — 74011250637 HC RX REV CODE- 250/637: Performed by: PHYSICIAN ASSISTANT

## 2022-11-22 PROCEDURE — 85610 PROTHROMBIN TIME: CPT

## 2022-11-22 PROCEDURE — 77030012390 HC DRN CHST BTL GTNG -B: Performed by: THORACIC SURGERY (CARDIOTHORACIC VASCULAR SURGERY)

## 2022-11-22 PROCEDURE — 77030007667 HC INSRT SUT HLD MEDT -B: Performed by: THORACIC SURGERY (CARDIOTHORACIC VASCULAR SURGERY)

## 2022-11-22 PROCEDURE — 77030002986 HC SUT PROL J&J -A: Performed by: THORACIC SURGERY (CARDIOTHORACIC VASCULAR SURGERY)

## 2022-11-22 PROCEDURE — 74011250637 HC RX REV CODE- 250/637: Performed by: INTERNAL MEDICINE

## 2022-11-22 PROCEDURE — 77030002912 HC SUT ETHBND J&J -A: Performed by: THORACIC SURGERY (CARDIOTHORACIC VASCULAR SURGERY)

## 2022-11-22 PROCEDURE — 77030033150: Performed by: THORACIC SURGERY (CARDIOTHORACIC VASCULAR SURGERY)

## 2022-11-22 PROCEDURE — 74011000258 HC RX REV CODE- 258: Performed by: NURSE ANESTHETIST, CERTIFIED REGISTERED

## 2022-11-22 PROCEDURE — 74011000250 HC RX REV CODE- 250: Performed by: NURSE PRACTITIONER

## 2022-11-22 PROCEDURE — 77030008771 HC TU NG SALEM SUMP -A: Performed by: ANESTHESIOLOGY

## 2022-11-22 PROCEDURE — 82962 GLUCOSE BLOOD TEST: CPT

## 2022-11-22 PROCEDURE — 83735 ASSAY OF MAGNESIUM: CPT

## 2022-11-22 PROCEDURE — 77030003020 HC SUT TICRN COVD -A: Performed by: THORACIC SURGERY (CARDIOTHORACIC VASCULAR SURGERY)

## 2022-11-22 PROCEDURE — 94002 VENT MGMT INPAT INIT DAY: CPT

## 2022-11-22 PROCEDURE — 77030020061 HC IV BLD WRMR ADMIN SET 3M -B: Performed by: ANESTHESIOLOGY

## 2022-11-22 PROCEDURE — 85730 THROMBOPLASTIN TIME PARTIAL: CPT

## 2022-11-22 PROCEDURE — 02HV33Z INSERTION OF INFUSION DEVICE INTO SUPERIOR VENA CAVA, PERCUTANEOUS APPROACH: ICD-10-PCS | Performed by: THORACIC SURGERY (CARDIOTHORACIC VASCULAR SURGERY)

## 2022-11-22 PROCEDURE — 2709999900 HC NON-CHARGEABLE SUPPLY

## 2022-11-22 PROCEDURE — 77030013798 HC KT TRNSDUC PRSSR EDWD -B: Performed by: THORACIC SURGERY (CARDIOTHORACIC VASCULAR SURGERY)

## 2022-11-22 PROCEDURE — 77030026438 HC STYL ET INTUB CARD -A: Performed by: ANESTHESIOLOGY

## 2022-11-22 PROCEDURE — 82330 ASSAY OF CALCIUM: CPT

## 2022-11-22 PROCEDURE — 36415 COLL VENOUS BLD VENIPUNCTURE: CPT

## 2022-11-22 PROCEDURE — 74011000250 HC RX REV CODE- 250: Performed by: ANESTHESIOLOGY

## 2022-11-22 PROCEDURE — 77030008771 HC TU NG SALEM SUMP -A: Performed by: THORACIC SURGERY (CARDIOTHORACIC VASCULAR SURGERY)

## 2022-11-22 PROCEDURE — 77030014491 HC PLEDG PTFE BARD -A: Performed by: THORACIC SURGERY (CARDIOTHORACIC VASCULAR SURGERY)

## 2022-11-22 PROCEDURE — 02RF08Z REPLACEMENT OF AORTIC VALVE WITH ZOOPLASTIC TISSUE, OPEN APPROACH: ICD-10-PCS | Performed by: THORACIC SURGERY (CARDIOTHORACIC VASCULAR SURGERY)

## 2022-11-22 PROCEDURE — 77030006994: Performed by: THORACIC SURGERY (CARDIOTHORACIC VASCULAR SURGERY)

## 2022-11-22 PROCEDURE — 74011000258 HC RX REV CODE- 258: Performed by: NURSE PRACTITIONER

## 2022-11-22 PROCEDURE — 74011000258 HC RX REV CODE- 258

## 2022-11-22 PROCEDURE — 77030020167 HC PERF SET MULT MEDT -B: Performed by: THORACIC SURGERY (CARDIOTHORACIC VASCULAR SURGERY)

## 2022-11-22 PROCEDURE — 74011636637 HC RX REV CODE- 636/637

## 2022-11-22 PROCEDURE — 02RX0JZ REPLACEMENT OF THORACIC AORTA, ASCENDING/ARCH WITH SYNTHETIC SUBSTITUTE, OPEN APPROACH: ICD-10-PCS | Performed by: THORACIC SURGERY (CARDIOTHORACIC VASCULAR SURGERY)

## 2022-11-22 PROCEDURE — 77030008684 HC TU ET CUF COVD -B: Performed by: ANESTHESIOLOGY

## 2022-11-22 PROCEDURE — 74011636637 HC RX REV CODE- 636/637: Performed by: THORACIC SURGERY (CARDIOTHORACIC VASCULAR SURGERY)

## 2022-11-22 PROCEDURE — 77030010506 HC ADH BIOGLU CRYO -G: Performed by: THORACIC SURGERY (CARDIOTHORACIC VASCULAR SURGERY)

## 2022-11-22 PROCEDURE — 33863 ASCENDING AORTIC GRAFT: CPT | Performed by: THORACIC SURGERY (CARDIOTHORACIC VASCULAR SURGERY)

## 2022-11-22 PROCEDURE — 03HY32Z INSERTION OF MONITORING DEVICE INTO UPPER ARTERY, PERCUTANEOUS APPROACH: ICD-10-PCS | Performed by: ANESTHESIOLOGY

## 2022-11-22 PROCEDURE — 77030005401 HC CATH RAD ARRO -A: Performed by: ANESTHESIOLOGY

## 2022-11-22 DEVICE — VALVE AORT 25MM REPL RESILIENT BOV PERICARD CO CHROMIUM ALLY: Type: IMPLANTABLE DEVICE | Site: AORTIC VALVE | Status: FUNCTIONAL

## 2022-11-22 DEVICE — IMPLANTABLE DEVICE: Type: IMPLANTABLE DEVICE | Site: AORTA | Status: FUNCTIONAL

## 2022-11-22 RX ORDER — CEFAZOLIN SODIUM 1 G/3ML
1 INJECTION, POWDER, FOR SOLUTION INTRAMUSCULAR; INTRAVENOUS ONCE
Status: COMPLETED | OUTPATIENT
Start: 2022-11-22 | End: 2022-11-22

## 2022-11-22 RX ORDER — LIDOCAINE HYDROCHLORIDE 20 MG/ML
INJECTION, SOLUTION EPIDURAL; INFILTRATION; INTRACAUDAL; PERINEURAL AS NEEDED
Status: DISCONTINUED | OUTPATIENT
Start: 2022-11-22 | End: 2022-11-22 | Stop reason: HOSPADM

## 2022-11-22 RX ORDER — LANOLIN ALCOHOL/MO/W.PET/CERES
400 CREAM (GRAM) TOPICAL 2 TIMES DAILY
Status: DISCONTINUED | OUTPATIENT
Start: 2022-11-23 | End: 2022-11-27

## 2022-11-22 RX ORDER — SODIUM CHLORIDE 0.9 % (FLUSH) 0.9 %
5-40 SYRINGE (ML) INJECTION AS NEEDED
Status: DISCONTINUED | OUTPATIENT
Start: 2022-11-22 | End: 2022-12-06

## 2022-11-22 RX ORDER — MUPIROCIN 20 MG/G
OINTMENT TOPICAL 2 TIMES DAILY
Status: COMPLETED | OUTPATIENT
Start: 2022-11-22 | End: 2022-11-27

## 2022-11-22 RX ORDER — ACETAMINOPHEN 325 MG/1
650 TABLET ORAL
Status: DISCONTINUED | OUTPATIENT
Start: 2022-11-22 | End: 2022-11-22

## 2022-11-22 RX ORDER — HEPARIN SODIUM 1000 [USP'U]/ML
2000 INJECTION, SOLUTION INTRAVENOUS; SUBCUTANEOUS ONCE
Status: COMPLETED | OUTPATIENT
Start: 2022-11-22 | End: 2022-11-22

## 2022-11-22 RX ORDER — INSULIN GLARGINE 100 [IU]/ML
1-50 INJECTION, SOLUTION SUBCUTANEOUS
Status: DISCONTINUED | OUTPATIENT
Start: 2022-11-22 | End: 2022-11-30 | Stop reason: SDUPTHER

## 2022-11-22 RX ORDER — LIDOCAINE 4 G/100G
2 PATCH TOPICAL EVERY 24 HOURS
Status: DISCONTINUED | OUTPATIENT
Start: 2022-11-22 | End: 2022-12-09 | Stop reason: HOSPADM

## 2022-11-22 RX ORDER — SODIUM CHLORIDE 0.9 % (FLUSH) 0.9 %
5-40 SYRINGE (ML) INJECTION EVERY 8 HOURS
Status: DISCONTINUED | OUTPATIENT
Start: 2022-11-22 | End: 2022-11-23

## 2022-11-22 RX ORDER — CEFAZOLIN SODIUM/WATER 2 G/20 ML
SYRINGE (ML) INTRAVENOUS AS NEEDED
Status: DISCONTINUED | OUTPATIENT
Start: 2022-11-22 | End: 2022-11-22 | Stop reason: HOSPADM

## 2022-11-22 RX ORDER — SODIUM CHLORIDE 9 MG/ML
9 INJECTION, SOLUTION INTRAVENOUS CONTINUOUS
Status: DISCONTINUED | OUTPATIENT
Start: 2022-11-22 | End: 2022-11-23

## 2022-11-22 RX ORDER — MIDAZOLAM HYDROCHLORIDE 1 MG/ML
INJECTION, SOLUTION INTRAMUSCULAR; INTRAVENOUS AS NEEDED
Status: DISCONTINUED | OUTPATIENT
Start: 2022-11-22 | End: 2022-11-22 | Stop reason: HOSPADM

## 2022-11-22 RX ORDER — FENTANYL CITRATE 50 UG/ML
50 INJECTION, SOLUTION INTRAMUSCULAR; INTRAVENOUS AS NEEDED
Status: DISCONTINUED | OUTPATIENT
Start: 2022-11-22 | End: 2022-11-22

## 2022-11-22 RX ORDER — HYDROMORPHONE HYDROCHLORIDE 1 MG/ML
0.5 INJECTION, SOLUTION INTRAMUSCULAR; INTRAVENOUS; SUBCUTANEOUS
Status: DISCONTINUED | OUTPATIENT
Start: 2022-11-22 | End: 2022-11-23

## 2022-11-22 RX ORDER — FENTANYL CITRATE 50 UG/ML
INJECTION, SOLUTION INTRAMUSCULAR; INTRAVENOUS AS NEEDED
Status: DISCONTINUED | OUTPATIENT
Start: 2022-11-22 | End: 2022-11-22 | Stop reason: HOSPADM

## 2022-11-22 RX ORDER — ACETAMINOPHEN 500 MG
1000 TABLET ORAL EVERY 6 HOURS
Status: DISCONTINUED | OUTPATIENT
Start: 2022-11-22 | End: 2022-11-23

## 2022-11-22 RX ORDER — PROTAMINE SULFATE 10 MG/ML
INJECTION, SOLUTION INTRAVENOUS AS NEEDED
Status: DISCONTINUED | OUTPATIENT
Start: 2022-11-22 | End: 2022-11-22 | Stop reason: HOSPADM

## 2022-11-22 RX ORDER — MAGNESIUM SULFATE 100 %
4 CRYSTALS MISCELLANEOUS AS NEEDED
Status: DISCONTINUED | OUTPATIENT
Start: 2022-11-22 | End: 2022-11-30 | Stop reason: SDUPTHER

## 2022-11-22 RX ORDER — LANOLIN ALCOHOL/MO/W.PET/CERES
3-6 CREAM (GRAM) TOPICAL
Status: DISCONTINUED | OUTPATIENT
Start: 2022-11-22 | End: 2022-12-09 | Stop reason: SDUPTHER

## 2022-11-22 RX ORDER — PROPOFOL 10 MG/ML
INJECTION, EMULSION INTRAVENOUS
Status: DISCONTINUED | OUTPATIENT
Start: 2022-11-22 | End: 2022-11-22 | Stop reason: HOSPADM

## 2022-11-22 RX ORDER — HEPARIN SODIUM 1000 [USP'U]/ML
INJECTION, SOLUTION INTRAVENOUS; SUBCUTANEOUS AS NEEDED
Status: DISCONTINUED | OUTPATIENT
Start: 2022-11-22 | End: 2022-11-22 | Stop reason: HOSPADM

## 2022-11-22 RX ORDER — CHLORHEXIDINE GLUCONATE 1.2 MG/ML
10 RINSE ORAL 2 TIMES DAILY
Status: DISPENSED | OUTPATIENT
Start: 2022-11-22 | End: 2022-11-29

## 2022-11-22 RX ORDER — ONDANSETRON 2 MG/ML
INJECTION INTRAMUSCULAR; INTRAVENOUS AS NEEDED
Status: DISCONTINUED | OUTPATIENT
Start: 2022-11-22 | End: 2022-11-22 | Stop reason: HOSPADM

## 2022-11-22 RX ORDER — TRAMADOL HYDROCHLORIDE 50 MG/1
50 TABLET ORAL
Status: DISCONTINUED | OUTPATIENT
Start: 2022-11-22 | End: 2022-11-23

## 2022-11-22 RX ORDER — SODIUM BICARBONATE 1 MEQ/ML
SYRINGE (ML) INTRAVENOUS AS NEEDED
Status: DISCONTINUED | OUTPATIENT
Start: 2022-11-22 | End: 2022-11-22 | Stop reason: HOSPADM

## 2022-11-22 RX ORDER — PAPAVERINE HYDROCHLORIDE 30 MG/ML
30 INJECTION INTRAMUSCULAR; INTRAVENOUS ONCE
Status: COMPLETED | OUTPATIENT
Start: 2022-11-22 | End: 2022-11-22

## 2022-11-22 RX ORDER — NALOXONE HYDROCHLORIDE 0.4 MG/ML
0.4 INJECTION, SOLUTION INTRAMUSCULAR; INTRAVENOUS; SUBCUTANEOUS AS NEEDED
Status: DISCONTINUED | OUTPATIENT
Start: 2022-11-22 | End: 2022-12-09 | Stop reason: HOSPADM

## 2022-11-22 RX ORDER — SODIUM CHLORIDE 0.9 % (FLUSH) 0.9 %
5-40 SYRINGE (ML) INJECTION EVERY 8 HOURS
Status: DISCONTINUED | OUTPATIENT
Start: 2022-11-22 | End: 2022-12-07

## 2022-11-22 RX ORDER — ONDANSETRON 2 MG/ML
4 INJECTION INTRAMUSCULAR; INTRAVENOUS
Status: DISCONTINUED | OUTPATIENT
Start: 2022-11-22 | End: 2022-12-09 | Stop reason: HOSPADM

## 2022-11-22 RX ORDER — BACITRACIN 500 UNIT/G
1 PACKET (EA) TOPICAL AS NEEDED
Status: DISCONTINUED | OUTPATIENT
Start: 2022-11-22 | End: 2022-12-05

## 2022-11-22 RX ORDER — AMIODARONE HYDROCHLORIDE 200 MG/1
400 TABLET ORAL 2 TIMES DAILY
Status: DISCONTINUED | OUTPATIENT
Start: 2022-11-23 | End: 2022-11-27

## 2022-11-22 RX ORDER — PROPOFOL 10 MG/ML
0-50 VIAL (ML) INTRAVENOUS
Status: DISCONTINUED | OUTPATIENT
Start: 2022-11-22 | End: 2022-11-23

## 2022-11-22 RX ORDER — GLYCOPYRROLATE 0.2 MG/ML
INJECTION INTRAMUSCULAR; INTRAVENOUS AS NEEDED
Status: DISCONTINUED | OUTPATIENT
Start: 2022-11-22 | End: 2022-11-22 | Stop reason: HOSPADM

## 2022-11-22 RX ORDER — ALBUTEROL SULFATE 0.83 MG/ML
2.5 SOLUTION RESPIRATORY (INHALATION)
Status: DISCONTINUED | OUTPATIENT
Start: 2022-11-22 | End: 2022-12-09 | Stop reason: HOSPADM

## 2022-11-22 RX ORDER — INSULIN LISPRO 100 [IU]/ML
INJECTION, SOLUTION INTRAVENOUS; SUBCUTANEOUS
Status: DISCONTINUED | OUTPATIENT
Start: 2022-11-24 | End: 2022-11-30

## 2022-11-22 RX ORDER — GUAIFENESIN 100 MG/5ML
81 LIQUID (ML) ORAL DAILY
Status: DISCONTINUED | OUTPATIENT
Start: 2022-11-23 | End: 2022-12-09 | Stop reason: HOSPADM

## 2022-11-22 RX ORDER — HYDROMORPHONE HYDROCHLORIDE 1 MG/ML
0.2 INJECTION, SOLUTION INTRAMUSCULAR; INTRAVENOUS; SUBCUTANEOUS
Status: DISCONTINUED | OUTPATIENT
Start: 2022-11-22 | End: 2022-11-22

## 2022-11-22 RX ORDER — SODIUM CHLORIDE 0.9 % (FLUSH) 0.9 %
5-40 SYRINGE (ML) INJECTION AS NEEDED
Status: DISCONTINUED | OUTPATIENT
Start: 2022-11-22 | End: 2022-11-23 | Stop reason: SDUPTHER

## 2022-11-22 RX ORDER — MIDAZOLAM HYDROCHLORIDE 1 MG/ML
1 INJECTION, SOLUTION INTRAMUSCULAR; INTRAVENOUS AS NEEDED
Status: DISCONTINUED | OUTPATIENT
Start: 2022-11-22 | End: 2022-11-22

## 2022-11-22 RX ORDER — INSULIN LISPRO 100 [IU]/ML
INJECTION, SOLUTION INTRAVENOUS; SUBCUTANEOUS
Status: ACTIVE | OUTPATIENT
Start: 2022-11-23 | End: 2022-11-24

## 2022-11-22 RX ORDER — ACETAMINOPHEN 325 MG/1
650 TABLET ORAL ONCE
Status: ACTIVE | OUTPATIENT
Start: 2022-11-22 | End: 2022-11-23

## 2022-11-22 RX ORDER — FENTANYL CITRATE/PF 1500MCG/30
PATIENT CONTROLLED ANALGESIA SYRINGE INTRAVENOUS
Status: DISCONTINUED | OUTPATIENT
Start: 2022-11-22 | End: 2022-11-23 | Stop reason: ALTCHOICE

## 2022-11-22 RX ORDER — SODIUM CHLORIDE 9 MG/ML
INJECTION, SOLUTION INTRAVENOUS
Status: DISCONTINUED | OUTPATIENT
Start: 2022-11-22 | End: 2022-11-22 | Stop reason: HOSPADM

## 2022-11-22 RX ORDER — BUPIVACAINE HYDROCHLORIDE 2.5 MG/ML
30 INJECTION, SOLUTION EPIDURAL; INFILTRATION; INTRACAUDAL ONCE
Status: COMPLETED | OUTPATIENT
Start: 2022-11-22 | End: 2022-11-22

## 2022-11-22 RX ORDER — ROCURONIUM BROMIDE 10 MG/ML
INJECTION, SOLUTION INTRAVENOUS AS NEEDED
Status: DISCONTINUED | OUTPATIENT
Start: 2022-11-22 | End: 2022-11-22 | Stop reason: HOSPADM

## 2022-11-22 RX ORDER — POLYETHYLENE GLYCOL 3350 17 G/17G
17 POWDER, FOR SOLUTION ORAL DAILY
Status: DISCONTINUED | OUTPATIENT
Start: 2022-11-23 | End: 2022-12-09 | Stop reason: HOSPADM

## 2022-11-22 RX ORDER — KETOROLAC TROMETHAMINE 30 MG/ML
15 INJECTION, SOLUTION INTRAMUSCULAR; INTRAVENOUS EVERY 6 HOURS
Status: DISCONTINUED | OUTPATIENT
Start: 2022-11-22 | End: 2022-11-22

## 2022-11-22 RX ORDER — LIDOCAINE HYDROCHLORIDE 10 MG/ML
0.1 INJECTION, SOLUTION EPIDURAL; INFILTRATION; INTRACAUDAL; PERINEURAL AS NEEDED
Status: DISCONTINUED | OUTPATIENT
Start: 2022-11-22 | End: 2022-11-23 | Stop reason: ALTCHOICE

## 2022-11-22 RX ORDER — SODIUM CHLORIDE 9 MG/ML
250 INJECTION, SOLUTION INTRAVENOUS AS NEEDED
Status: DISCONTINUED | OUTPATIENT
Start: 2022-11-22 | End: 2022-11-22

## 2022-11-22 RX ORDER — ONDANSETRON 2 MG/ML
4 INJECTION INTRAMUSCULAR; INTRAVENOUS AS NEEDED
Status: DISCONTINUED | OUTPATIENT
Start: 2022-11-22 | End: 2022-11-28 | Stop reason: SDUPTHER

## 2022-11-22 RX ORDER — VANCOMYCIN/0.9 % SOD CHLORIDE 1.5G/250ML
1.5 PLASTIC BAG, INJECTION (ML) INTRAVENOUS EVERY 12 HOURS
Status: DISCONTINUED | OUTPATIENT
Start: 2022-11-22 | End: 2022-11-23 | Stop reason: DRUGHIGH

## 2022-11-22 RX ORDER — AMOXICILLIN 250 MG
1 CAPSULE ORAL DAILY
Status: DISCONTINUED | OUTPATIENT
Start: 2022-11-23 | End: 2022-11-29

## 2022-11-22 RX ORDER — PROPOFOL 10 MG/ML
INJECTION, EMULSION INTRAVENOUS AS NEEDED
Status: DISCONTINUED | OUTPATIENT
Start: 2022-11-22 | End: 2022-11-22 | Stop reason: HOSPADM

## 2022-11-22 RX ORDER — NEOSTIGMINE METHYLSULFATE 1 MG/ML
INJECTION, SOLUTION INTRAVENOUS AS NEEDED
Status: DISCONTINUED | OUTPATIENT
Start: 2022-11-22 | End: 2022-11-22 | Stop reason: HOSPADM

## 2022-11-22 RX ORDER — SODIUM CHLORIDE 450 MG/100ML
10 INJECTION, SOLUTION INTRAVENOUS CONTINUOUS
Status: DISCONTINUED | OUTPATIENT
Start: 2022-11-22 | End: 2022-12-05

## 2022-11-22 RX ORDER — MIDAZOLAM HYDROCHLORIDE 1 MG/ML
1 INJECTION, SOLUTION INTRAMUSCULAR; INTRAVENOUS
Status: DISCONTINUED | OUTPATIENT
Start: 2022-11-22 | End: 2022-12-02

## 2022-11-22 RX ORDER — FENTANYL CITRATE 50 UG/ML
25 INJECTION, SOLUTION INTRAMUSCULAR; INTRAVENOUS
Status: DISCONTINUED | OUTPATIENT
Start: 2022-11-22 | End: 2022-11-22

## 2022-11-22 RX ORDER — MAGNESIUM SULFATE 1 G/100ML
1 INJECTION INTRAVENOUS AS NEEDED
Status: DISCONTINUED | OUTPATIENT
Start: 2022-11-22 | End: 2022-12-05

## 2022-11-22 RX ORDER — SODIUM CHLORIDE, SODIUM LACTATE, POTASSIUM CHLORIDE, CALCIUM CHLORIDE 600; 310; 30; 20 MG/100ML; MG/100ML; MG/100ML; MG/100ML
50 INJECTION, SOLUTION INTRAVENOUS CONTINUOUS
Status: DISCONTINUED | OUTPATIENT
Start: 2022-11-22 | End: 2022-11-23 | Stop reason: ALTCHOICE

## 2022-11-22 RX ORDER — SODIUM CHLORIDE, SODIUM LACTATE, POTASSIUM CHLORIDE, CALCIUM CHLORIDE 600; 310; 30; 20 MG/100ML; MG/100ML; MG/100ML; MG/100ML
25 INJECTION, SOLUTION INTRAVENOUS CONTINUOUS
Status: DISCONTINUED | OUTPATIENT
Start: 2022-11-22 | End: 2022-11-22

## 2022-11-22 RX ADMIN — SODIUM CHLORIDE 13 UNITS/HR: 9 INJECTION, SOLUTION INTRAVENOUS at 23:10

## 2022-11-22 RX ADMIN — ONDANSETRON HYDROCHLORIDE 4 MG: 2 INJECTION, SOLUTION INTRAMUSCULAR; INTRAVENOUS at 14:17

## 2022-11-22 RX ADMIN — SODIUM CHLORIDE, POTASSIUM CHLORIDE, SODIUM LACTATE AND CALCIUM CHLORIDE 250 ML: 600; 310; 30; 20 INJECTION, SOLUTION INTRAVENOUS at 17:40

## 2022-11-22 RX ADMIN — MUPIROCIN: 20 OINTMENT TOPICAL at 19:03

## 2022-11-22 RX ADMIN — SODIUM CHLORIDE 1.4 MCG/KG/HR: 9 INJECTION, SOLUTION INTRAVENOUS at 20:32

## 2022-11-22 RX ADMIN — Medication 2 G: at 08:20

## 2022-11-22 RX ADMIN — SODIUM CHLORIDE: 9 INJECTION, SOLUTION INTRAVENOUS at 07:56

## 2022-11-22 RX ADMIN — EPINEPHRINE 2 MCG/MIN: 1 INJECTION INTRAMUSCULAR; INTRAVENOUS; SUBCUTANEOUS at 17:00

## 2022-11-22 RX ADMIN — DESMOPRESSIN ACETATE 40 MCG: 4 INJECTION INTRAVENOUS at 13:09

## 2022-11-22 RX ADMIN — SODIUM CHLORIDE 1.2 MCG/KG/HR: 9 INJECTION, SOLUTION INTRAVENOUS at 21:50

## 2022-11-22 RX ADMIN — Medication 3 MG: at 14:34

## 2022-11-22 RX ADMIN — FENTANYL CITRATE: 50 INJECTION INTRAVENOUS at 16:38

## 2022-11-22 RX ADMIN — FENTANYL CITRATE 50 MCG: 50 INJECTION INTRAMUSCULAR; INTRAVENOUS at 07:00

## 2022-11-22 RX ADMIN — VANCOMYCIN HYDROCHLORIDE 1500 MG: 10 INJECTION, POWDER, LYOPHILIZED, FOR SOLUTION INTRAVENOUS at 17:22

## 2022-11-22 RX ADMIN — LORAZEPAM 1 MG: 1 TABLET ORAL at 00:03

## 2022-11-22 RX ADMIN — ROCURONIUM BROMIDE 50 MG: 10 INJECTION INTRAVENOUS at 09:30

## 2022-11-22 RX ADMIN — ACETAMINOPHEN 1000 MG: 500 TABLET ORAL at 18:00

## 2022-11-22 RX ADMIN — AMINOCAPROIC ACID 10 G/HR: 250 INJECTION, SOLUTION INTRAVENOUS at 07:56

## 2022-11-22 RX ADMIN — ROCURONIUM BROMIDE 50 MG: 10 INJECTION INTRAVENOUS at 12:41

## 2022-11-22 RX ADMIN — EPINEPHRINE 3 MCG/MIN: 1 INJECTION INTRAMUSCULAR; INTRAVENOUS; SUBCUTANEOUS at 12:43

## 2022-11-22 RX ADMIN — NITROGLYCERIN 20 MCG: 20 INJECTION INTRAVENOUS at 13:04

## 2022-11-22 RX ADMIN — ACETAMINOPHEN 1000 MG: 500 TABLET ORAL at 23:05

## 2022-11-22 RX ADMIN — PROPOFOL 200 MG: 10 INJECTION, EMULSION INTRAVENOUS at 07:56

## 2022-11-22 RX ADMIN — SODIUM CHLORIDE 0.3 MCG/KG/HR: 9 INJECTION, SOLUTION INTRAVENOUS at 11:50

## 2022-11-22 RX ADMIN — SODIUM CHLORIDE 200 MCG: 900 INJECTION, SOLUTION INTRAVENOUS at 07:58

## 2022-11-22 RX ADMIN — LIDOCAINE HYDROCHLORIDE 100 MG: 20 INJECTION, SOLUTION EPIDURAL; INFILTRATION; INTRACAUDAL; PERINEURAL at 07:56

## 2022-11-22 RX ADMIN — SODIUM CHLORIDE 16 UNITS/HR: 9 INJECTION, SOLUTION INTRAVENOUS at 01:40

## 2022-11-22 RX ADMIN — SODIUM CHLORIDE, PRESERVATIVE FREE 10 ML: 5 INJECTION INTRAVENOUS at 17:55

## 2022-11-22 RX ADMIN — FENTANYL CITRATE 100 MCG/HR: 50 INJECTION, SOLUTION INTRAMUSCULAR; INTRAVENOUS at 07:56

## 2022-11-22 RX ADMIN — PROPOFOL 20 MG: 10 INJECTION, EMULSION INTRAVENOUS at 08:41

## 2022-11-22 RX ADMIN — 0.12% CHLORHEXIDINE GLUCONATE 15 ML: 1.2 RINSE ORAL at 23:00

## 2022-11-22 RX ADMIN — Medication 2 G: at 14:10

## 2022-11-22 RX ADMIN — CHLORHEXIDINE GLUCONATE 0.12% ORAL RINSE 10 ML: 1.2 LIQUID ORAL at 19:02

## 2022-11-22 RX ADMIN — PROPOFOL 25 MCG/KG/MIN: 10 INJECTION, EMULSION INTRAVENOUS at 13:53

## 2022-11-22 RX ADMIN — ROCURONIUM BROMIDE 100 MG: 10 INJECTION INTRAVENOUS at 07:56

## 2022-11-22 RX ADMIN — SODIUM CHLORIDE, PRESERVATIVE FREE 10 ML: 5 INJECTION INTRAVENOUS at 23:02

## 2022-11-22 RX ADMIN — Medication 2 G: at 11:15

## 2022-11-22 RX ADMIN — KETOROLAC TROMETHAMINE 15 MG: 30 INJECTION, SOLUTION INTRAMUSCULAR; INTRAVENOUS at 16:24

## 2022-11-22 RX ADMIN — BUSPIRONE HYDROCHLORIDE 15 MG: 5 TABLET ORAL at 23:00

## 2022-11-22 RX ADMIN — SODIUM CHLORIDE 0.8 MCG/KG/HR: 9 INJECTION, SOLUTION INTRAVENOUS at 23:15

## 2022-11-22 RX ADMIN — SODIUM CHLORIDE 1 MCG/KG/HR: 9 INJECTION, SOLUTION INTRAVENOUS at 16:33

## 2022-11-22 RX ADMIN — SODIUM BICARBONATE 50 MEQ: 84 INJECTION, SOLUTION INTRAVENOUS at 14:16

## 2022-11-22 RX ADMIN — MIDAZOLAM 5 MG: 1 INJECTION INTRAMUSCULAR; INTRAVENOUS at 07:00

## 2022-11-22 RX ADMIN — SODIUM CHLORIDE 200 MCG: 900 INJECTION, SOLUTION INTRAVENOUS at 08:00

## 2022-11-22 RX ADMIN — FENTANYL CITRATE 200 MCG: 50 INJECTION INTRAMUSCULAR; INTRAVENOUS at 08:25

## 2022-11-22 RX ADMIN — FENTANYL CITRATE 200 MCG: 50 INJECTION INTRAMUSCULAR; INTRAVENOUS at 07:56

## 2022-11-22 RX ADMIN — PHENYLEPHRINE HYDROCHLORIDE 60 MCG/MIN: 10 INJECTION INTRAVENOUS at 07:56

## 2022-11-22 RX ADMIN — SODIUM CHLORIDE 10 ML/HR: 4.5 INJECTION, SOLUTION INTRAVENOUS at 17:48

## 2022-11-22 RX ADMIN — MUPIROCIN: 20 OINTMENT TOPICAL at 22:59

## 2022-11-22 RX ADMIN — PROTAMINE SULFATE 300 MG: 10 INJECTION, SOLUTION INTRAVENOUS at 13:05

## 2022-11-22 RX ADMIN — SODIUM CHLORIDE 9 ML/HR: 9 INJECTION, SOLUTION INTRAVENOUS at 16:25

## 2022-11-22 RX ADMIN — HEPARIN SODIUM 45000 UNITS: 1000 INJECTION, SOLUTION INTRAVENOUS; SUBCUTANEOUS at 09:20

## 2022-11-22 RX ADMIN — FAMOTIDINE 20 MG: 10 INJECTION, SOLUTION INTRAVENOUS at 23:00

## 2022-11-22 RX ADMIN — SODIUM CHLORIDE 1 MCG/KG/HR: 9 INJECTION, SOLUTION INTRAVENOUS at 22:45

## 2022-11-22 RX ADMIN — GLYCOPYRROLATE 0.4 MG: 0.2 INJECTION, SOLUTION INTRAMUSCULAR; INTRAVENOUS at 14:34

## 2022-11-22 RX ADMIN — SODIUM CHLORIDE, POTASSIUM CHLORIDE, SODIUM LACTATE AND CALCIUM CHLORIDE 25 ML/HR: 600; 310; 30; 20 INJECTION, SOLUTION INTRAVENOUS at 07:00

## 2022-11-22 RX ADMIN — SODIUM CHLORIDE 12.4 UNITS/HR: 9 INJECTION, SOLUTION INTRAVENOUS at 16:17

## 2022-11-22 RX ADMIN — SODIUM BICARBONATE 50 MEQ: 84 INJECTION, SOLUTION INTRAVENOUS at 14:19

## 2022-11-22 NOTE — CONSULTS
ICU DAILY PROGRESS NOTE      Summary  35 y/o male POD ) from CABG x1 an AV valve replacement. He arrived intubated and on pressors. Previous 24 Hours    On pump CORONARY ARTERY BYPASS GRAFT  X 1 WITH LEFT INTERNAL MAMMARY ARTERY to LAD  AORTIC VALVE REPLACEMENT WITH MEYER 25MM INSPIRIS RESILIA TISSUE VALVE   REPAIR OF ASCENDING AORTIC ANEURYSM with 26mm hemashield graft    [facility] Day 7    Critical Care Problems    Patient Active Problem List   Diagnosis Code    DM (diabetes mellitus) ([facility].) E11.9    Acquired hypothyroidism E03.9    Essential hypertension I10    Fibromyalgia M79.7    Microalbuminuria R80.9    Anxiety F41.9    Migraine without aura G43.009    Hypertriglyceridemia E78.1    Severe obesity ([facility]) E66.01    Transient ischemic attack involving left internal carotid artery G45. 1    Chest pain R07.9    Unstable angina ([facility]) I20.0    Coronary artery disease involving native coronary artery of native heart with unstable angina pectoris ([facility]) I25.110    Type 2 diabetes with nephropathy ([facility]) E11.21    Dysthymia F34.1    Adjustment disorder with mixed emotional features F43.29    Anxiety disorder due to general medical condition with panic attack F06.4, F41.0    Insomnia disorder with non-sleep disorder mental comorbidity G47.00    Dizzy spells R42    Multiple lacunar infarcts ([facility]) I63.81    Cervical spondylosis with radiculopathy M47.22    Low back pain M54.50    Corneal abrasion, right S05. 01XA    Stable proliferative diabetic retinopathy of both eyes associated with type 1 diabetes mellitus ([facility].) F48.0187    Bilateral carotid artery stenosis I65.23    Acute non-Q wave non-ST elevation myocardial infarction (NSTEMI) ([facility]) I21.4    Aortic stenosis I35.0    CAD (coronary artery disease) I25.10    S/P CABG x 1 Z95.1    S/P AVR (aortic valve replacement) and aortoplasty Z95.2         Plan    Neuro  -routine post op pain management  - wean off sedation for SAT/SBT trial    CVS  CAD s/p CABG  - wean pressors to maintain MAP > 65 and SVO2 > 65  - cont. amio    Pulm  Acute Postoperative respiratory failure  - wean Fio2  - SBT and extubate once ready. GI  - on famotidine    Renal  -replete K and Mg as needed    Endo  Hyperglycemia  Hypothyroidism  - insulin drip, BS goal   - cont levothyroxine    ID  - complete periop vanc    Heme  -DVT prophylaxis as per CT surgery    Vitals  Visit Vitals  BP (!) 104/49   Pulse 86   Temp 98.3 °F (36.8 °C)   Resp 16   Ht 5' 9\" (1.753 m)   Wt 114.9 kg (253 lb 4.9 oz)   SpO2 96%   BMI 37.41 kg/m²    O2 Flow Rate (L/min): 4 l/min O2 Device: Ventilator Temp (24hrs), Av °F (36.7 °C), Min:97.8 °F (36.6 °C), Max:98.3 °F (36.8 °C)    CVP (mmHg): 10 mmHg (22 1532)      Intake/Output:     Intake/Output Summary (Last 24 hours) at 2022 1719  Last data filed at 2022 1700  Gross per 24 hour   Intake 1910 ml   Output 2849 ml   Net -939 ml         Physical exam:        I have examined the patient on this day 2022 and the above documented exam is accurate including the components that have been copied forward    LABS AND  DATA: Personally reviewed  Recent Labs     22  1513 22  0430   WBC  --  8.9   HGB 11.8* 13.2   HCT 34.5* 38.4   PLT  --  187     Recent Labs     22  1513 22  0430   NA  --  135*   K  --  4.1   CL  --  103   CO2  --  25   BUN  --  17   CREA  --  0.97   GLU  --  305*   CA  --  8.4*   MG 3.0*  --      No results for input(s): AP, TBIL, TP, ALB, GLOB, AML, LPSE in the last 72 hours. No lab exists for component: SGOT, GPT, AMYP  Recent Labs     22  1513   INR 1.1   PTP 11.2*   APTT 26.4      No results for input(s): PHI, PCO2I, PO2I, FIO2I in the last 72 hours. No results for input(s): CPK, CKMB, TROIQ, BNPP in the last 72 hours.     Hemodynamics:   PAP: PAP Systolic: 37 (44/52/79 0615) CO: CO (l/min): 6.1 l/min (22 1700)   Wedge:   CI: CI (l/min/m2): 2.7 l/min/m2 (22 1700)   CVP:  CVP (mmHg): 10 mmHg (11/22/22 1532) SVR:       PVR:       Ventilator Settings:  Mode Rate Tidal Volume Pressure FiO2 PEEP   Assist control   450 ml  8 cm H2O 80 % 5 cm H20     Peak airway pressure: 27 cm H2O    Minute ventilation: 10.1 l/min        MEDS: Reviewed    Chest X-Ray:  CXR Results  (Last 48 hours)                 11/22/22 1523  XR CHEST PORT Final result    Impression:  New postsurgical changes. Tubes and lines in satisfactory position. Mild   pulmonary fluid overload. Narrative:  INDICATION:    post op heart        EXAMINATION:  AP CHEST, PORTABLE       COMPARISON: November 15       FINDINGS: Single AP portable view of the chest at 1513 hours demonstrates   interval median sternotomy, intubation, and placement of a NG tube in the   stomach. There is also a right IJ line, with a Bozrah-Yuly catheter tip in the   main pulmonary outflow tract. There is evidence of chest/mediastinal drains. There is no evidence of pneumothorax. There is mild pulmonary fluid overload. The cardiomediastinal silhouette is stable. There is no new airspace disease. Multidisciplinary Rounds Completed:  N/A      DISPOSITION  Stay in ICU    CRITICAL CARE CONSULTANT NOTE  I had a in-person encounter with Charlee Raya, reviewed and interpreted patient data including events, labs, images, vital signs, I/O's, and examined patient. I have discussed the case and the plan and management of the patient's care with the consulting services, the bedside nurses and the respiratory therapist.      NOTE OF PERSONAL INVOLVEMENT IN CARE   This patient is at high risk for sudden and clinically significant deterioration, which requires the highest level of preparedness to intervene urgently. I participated in the decision-making and personally managed or directed the management of the following life and organ supporting interventions that required my frequent assessment to treat or prevent imminent deterioration.     I personally spent 50 minutes of critical care time. This is time spent at patient's bedside actively involved in patient care as well as the coordination of care and discussions with the patient's family. This does not include any procedural time which has been billed separately.       ------------------------------------------------------------------------------    Petar Peguero MD, PhD  [address]  [phone]

## 2022-11-22 NOTE — ANESTHESIA PROCEDURE NOTES
Arterial Line Placement    Start time: 11/22/2022 7:00 AM  Performed by: Lyubov Sorensen MD  Authorized by: Lyubov Sorensen MD     Pre-Procedure  Indications:  Arterial pressure monitoring and blood sampling  Preanesthetic Checklist: patient identified, risks and benefits discussed, anesthesia consent, site marked, patient being monitored, timeout performed and patient being monitored    Timeout Time: 07:00 EST      Procedure:   Prep:  ChloraPrep  Seldinger Technique?: Yes    Orientation:  Right  Location:  Radial artery  Catheter size:  20 G  Number of attempts:  1    Assessment:   Post-procedure:  Line secured and sterile dressing applied  Patient Tolerance:  Patient tolerated the procedure well with no immediate complications

## 2022-11-22 NOTE — PROGRESS NOTES
1900: Received report from day shift nurse Ginny Arellano. Reviewed SBAR, Kardex, I/O, MAR, Procedural information and Recent results. VSS, NSR (rhythm), RA (oxygenation). A/O X 4  Pt resting in bed/sitting up in the bed. Family at bedside. Pt reporting no CP/SOB. Heparin gtt at 14u/kg/hr. To be turned off at MN. Insulin gtt at 8. 8units/hr, hourly BG checks. Plan for first case AVR and CABG in the AM.     2200: Pt showered. Complete linen change. Complete equipment change. All equipment bleach cleansed. Pt is feeling ready for tomorrow's procedure. Procedural and Blood consent signed and on the chart. Anesthesia consent has not been signed by MD.     Pt has request for CM/MD/Authorized personnel. Pt has a court date with [address] [place] [address] on 11/28/22. Pt needs a letter of recognition FAXED to the district court acknowledging that he will be unable to attend this court date due to major cardiovascular surgery on 11/22/22. Pt needs this letter faxed to the [facility] office. Info below:   Address: [address] [place] [address], [address] [place] [address]  Telephone #: [phone]  Fax #: [phone].     0000: Pt NPO. Heparin gtt turned off. Insulin gtt running at 14.5units/hr. 0530: VSS, NSR, RA, no CP/SOB. Insulin gtt running at 1.6units/hr. 56: Verbal report given to Ortega Barr, pre-op RN. Pt washing up with his second CHG/gown change for the day of surgery. 7804: PT MARCELO for surgery, transported with RN and family.

## 2022-11-22 NOTE — ANESTHESIA POSTPROCEDURE EVALUATION
Post-Anesthesia Evaluation and Assessment    Patient: Bogdan Mora MRN: [id number]  SSN: [id number]    YOB: 1980  Age: 36 y.o. Sex: male      I have evaluated the patient and they are stable in the ICU. Cardiovascular Function/Vital Signs  Visit Vitals  BP (!) 104/49   Pulse 90   Temp 36.6 °C (97.8 °F)   Resp 8   Ht 5' 9\" (1.753 m)   Wt 114.9 kg (253 lb 4.9 oz)   SpO2 93%   BMI 37.41 kg/m²       Patient is status post General anesthesia for Procedure(s):  SYLWIA BY DR Ibrahima Alvarez -  CORONARY ARTERY BYPASS GRAFT  X 1 WITH LEFT INTERNAL MAMMARY ARTERY GRAFTING - AORTIC VALVE REPLACEMENT WITH MEYER 25MM INSPIRIS RESILIA TISSUE VALVE AND REPAIR OF ASCENDING AORTIC ANEURYSM. Nausea/Vomiting: None    Postoperative hydration reviewed and adequate. Pain:  Pain Scale 1: Numeric (0 - 10) (11/22/22 0710)  Pain Intensity 1: 0 (11/22/22 0710)   Managed    Neurological Status:   Neuro (WDL): Within Defined Limits (11/22/22 0718)   Intubated and sedated     Pulmonary Status:   O2 Device: Nasal cannula (11/22/22 0730)   Intubated with adequate ventilation and oxygenation     Complications related to anesthesia: None    Post-anesthesia assessment completed.  No concerns    Signed By: Manpreet Kauffman MD     November 22, 2022

## 2022-11-22 NOTE — PERIOP NOTES
06:15= brought to Pre OP via stretcher; A&Ox4, consents verified (3).     06:45= clipped and cleansed according to protocol; mepilex dsg applied to sacrum; area noted in center of sacrum, which appears to be from moisture. 06:58= timeout performed with Dr. Elizabeth Nguyen; 2LO2NC placed on pt and frequent VS started prior to sedation. Two warming blankets applied. 07:09= O2 sats decreased to 75% on 2LO2NC; increased to 4LO2 and sats increased to 94-99% (hx of WALLY).

## 2022-11-22 NOTE — PERIOP NOTES
0830 - FAMILY UPDATED RE PROCEDURE PROGRESS - SPOKE WITH PATIENTS MOTHER VIOLET    1000 - FAMILY UPDATED RE PROCEDURE PROGRESS - SPOKE WITH  PATIENTS MOTHER VIOLET    1315 - FAMILY UPDATED RE PROCEDURE PROGRESS - SPOKE WITH PATIENTS  MOTHER VIOLET    1320 - TRANSFER - OUT REPORT:    Verbal report given to ANH Guzman  being transferred to CCU for routine progression of care       Report consisted of patients Situation, Background, Assessment and   Recommendations(SBAR). Information from the following report(s) SBAR, OR Summary, and Procedure Summary was reviewed with the receiving nurse. Lines:   Double Lumen 11/22/22 Right (Active)       Peripheral IV 11/15/22 Left Antecubital (Active)   Site Assessment Clean, dry, & intact 11/22/22 0700   Phlebitis Assessment 0 11/22/22 0700   Infiltration Assessment 0 11/22/22 0700   Dressing Status Clean, dry, & intact 11/22/22 0700   Dressing Type Tape;Transparent 11/22/22 0700   Hub Color/Line Status Infusing;Pink 11/22/22 0700   Action Taken Blood drawn 11/21/22 0415   Alcohol Cap Used Yes 11/21/22 0759       Peripheral IV 11/15/22 Right Antecubital (Active)   Site Assessment Clean, dry, & intact 11/22/22 0700   Phlebitis Assessment 0 11/22/22 0700   Infiltration Assessment 0 11/22/22 0700   Dressing Status Clean, dry, & intact 11/22/22 0700   Dressing Type Tape;Transparent 11/22/22 0700   Hub Color/Line Status Flushed;Pink; Infusing 11/22/22 0700   Action Taken Blood drawn 11/17/22 0329   Alcohol Cap Used Yes 11/21/22 0759       Arterial Line 11/22/22 Right Radial artery (Active)        Opportunity for questions and clarification was provided.       Patient transported with:   Monitor  O2 @ 10 liters via ET Tube and AMBU Bag ventilations accompanied by CRNA, MARGARITA and RN Circulator

## 2022-11-22 NOTE — ANESTHESIA PREPROCEDURE EVALUATION
Anesthetic History   No history of anesthetic complications            Review of Systems / Medical History  Patient summary reviewed, nursing notes reviewed and pertinent labs reviewed    Pulmonary        Sleep apnea: CPAP  Smoker      Comments: Former Smoker   Neuro/Psych         TIA, headaches and psychiatric history    Comments: Migraines  Anxiety Cardiovascular    Hypertension          Past MI, CAD, cardiac stents (x8, last time 2019. ) and hyperlipidemia    Exercise tolerance: <4 METS  Comments:      Left Ventricle: Low normal left ventricular systolic function with a visually estimated EF of 50 - 55%. Left ventricle size is normal. Increased wall thickness. Normal wall motion.   Aortic Valve: Not well visualized. Possible bicuspid aortic valve. Thickened cusp. Calcified cusp with restricted leaflet motion. Moderate to Severe stenosis of the aortic valve. Mean gradient 39.9 mm Hg. Peak veolocity 4.05 m/sec. GI/Hepatic/Renal     GERD: well controlled          Comments: Hx PANCREATITIS Endo/Other    Diabetes: type 2  Hypothyroidism  Obesity and arthritis     Other Findings   Comments: Fibromyalgia  Bulging discs in neck           Physical Exam    Airway  Mallampati: IV  TM Distance: 4 - 6 cm  Neck ROM: decreased range of motion, short neck   Mouth opening: Normal     Cardiovascular    Rhythm: regular  Rate: normal         Dental  No notable dental hx    Comments: 2 missing, no loose   Pulmonary  Breath sounds clear to auscultation               Abdominal  GI exam deferred       Other Findings            Anesthetic Plan    ASA: 4  Anesthesia type: general    Monitoring Plan: Arterial line, BIS, CVP, [place]-Yuly and SYLWIA    Post procedure ventilation   Induction: Intravenous  Anesthetic plan and risks discussed with: Patient      Discussed with the patient risks of anesthesia for the procedure and they wish to proceed.  Plan for GETA with standard ASA monitors, 2 PIV, arterial line, central line, PA catheter, SYLWIA, blood transfusion likely, ICU post op and all other indicated procedures.

## 2022-11-22 NOTE — PERIOP NOTES
TRANSFER - IN REPORT:    Verbal report received from Hailey RN(name) on Dorbi Laughter  being received from Jim Holliday) for ordered procedure      Report consisted of patients Situation, Background, Assessment and   Recommendations(SBAR). Information from the following report(s) SBAR, Kardex, Procedure Summary, Intake/Output, MAR, Recent Results, Med Rec Status, Cardiac Rhythm NSR, and Procedure Verification was reviewed with the receiving nurse. Opportunity for questions and clarification was provided. Assessment completed upon patients arrival to unit and care assumed.

## 2022-11-22 NOTE — ANESTHESIA PROCEDURE NOTES
SYLWIA  Date/Time: 11/22/2022 12:01 PM      Procedure Details: probe placement, image aquisition & interpretation    Risks and benefits discussed with the patient and plans are to proceed    Procedure Note    Performed by: Marie Alex MD  Authorized by: Marie Alex MD     Indications: assessment of ascending aorta and assessment of surgical repair  Modalities: 2D, CF, CWD, PWD  Probe Type: biplane and multiplane  Insertion: atraumatic  Patient Status: intubated and sedated  Echocardiographic and Doppler Measurements   Aorta  Size  Diam(cm)  Dissection PlaqueThick(mm)  Plaque Mobile    Ascending dilated 4.2 No  No    Arch normal  No  No    Descending normal  No  No          Valves  Annulus  Stenosis  Area/Grad  Regurg  Leaflet   Morph  Leaflet   Motion    Aortic normal severe  1+ calcified, bicuspid restricted    Mitral normal none  1+ normal normal    Tricuspid normal none  1+ normal normal          Atria  Size  SEC (smoke)  Thrombus  Tumor  Device    Rt Atrium normal No No  No    Lt Atrium normal No No  No     Interatrial Septum Morphology: normal    Interventricular Septum Morphology: normal  Ventricle  Cavity Size  Cavity Dimension Hypertrophy  Thrombus  Gloal FXN  EF    RV normal  No no normal     LV normal  Yes No mildly impaired >50%       Regional Function  (1 = normal, 2 = mildly hypokinetic, 3 = severely hypokinetic, 4 = akinetic, 5 = dyskinetic) LAV - Long [place]   ME LAV = 0  ME LAV = 90  ME LAV = 130   Basal Sept:2 Basal Ant:2 Basal Post:3   Mid Sept:2 Mid Ant:2 Mid Post:2   Apical Sept:2 Apical Ant:2 Basal Ant Sept:2   Basal Lat:2 Basal Inf:2 Mid Ant Sept:2   Mid Lat:2 Mid Inf:2    Apical Lat:2 Apical Inf:2      Diastolic function: Grade 1  Pericardium: normal    Post Intervention Follow-up Study  Ventricular Global Function: unchanged  Ventricular Regional Function: unchanged     Valve  Function  Regurgitation  Area    Aortic improved 0     Mitral no change 1+     Tricuspid no change 1+     Prosthetic normal       Complications: None  Comments: Pre Exam- Mild global LV dysfunction with LVEF 50%, normal RV function. Bicuspid aortic valve with severe AS and trace AI. Normal appearing mitral valve with trace MR, normal appearing tricuspid valve with trace TR. Normal atrium, JOHN no thrombus, no PFO. No Pericardial effusion. Ascending aorta dilated to 4.2 cm no dissection. Normal descending aorta. Post Exam- S/p CABG, AVR, ascending aorta replacement. Initial post pump severe RV dysfunction, improved to mild dysfunction with epi. Bioprosthetic AVR well positioned, leaflets mobile, mean grad 9mmhg, no PVL. LVEF 50% with no new WMA. Rest of exam unchanged.

## 2022-11-22 NOTE — ANESTHESIA PROCEDURE NOTES
Central Line Placement with PAC insertion    Start time: 11/22/2022 7:00 AM  Performed by: Fabrizio Valente MD  Authorized by: Fabrizio Valente MD     Indications: vascular access, central pressure monitoring and need for vasopressors  Preanesthetic Checklist: patient identified, risks and benefits discussed, anesthesia consent, site marked, patient being monitored and timeout performed    Timeout Time: 07:00 EST       Pre-procedure: All elements of maximal sterile barrier technique followed?  Yes    2% Chlorhexidine for cutaneous antisepsis, Hand hygiene performed prior to catheter insertion and Ultrasound guidance    Sterile Ultrasound Technique followed?: Yes            Procedure:   Prep:  Chlorhexidine  Location: internal jugular  Orientation:  Right  Patient position:  Trendelenburg  Catheter type:  Double lumen  Catheter size:  9 Fr  Catheter length:  12 cm  Number of attempts:  1  Successful placement: Yes      Assessment:   Post-procedure:  Catheter secured and sterile dressing applied  Assessment:  Blood return through all ports, free fluid flow and guidewire removal verified  Insertion:  Uncomplicated  Patient tolerance:  Patient tolerated the procedure well with no immediate complications  8 Fr CCO PAC

## 2022-11-22 NOTE — BRIEF OP NOTE
BRIEF OP NOTE  Pre-Op Diagnosis: SEVERE AORTIC STENOSIS, CAD    Post-Op Diagnosis: SEVERE AORTIC STENOSIS, CAD      Procedure: On pump CORONARY ARTERY BYPASS GRAFT  X 1 WITH LEFT INTERNAL MAMMARY ARTERY to LAD  AORTIC VALVE REPLACEMENT WITH MEYER 25MM INSPIRIS RESILIA TISSUE VALVE   REPAIR OF ASCENDING AORTIC ANEURYSM with 26mm hemashield graft    Surgeon: Maxine Coleman MD    Assistant(s): Raphael Myrick PA-C    Anesthesia: General     Infusions: precedex, insulin, baldev, epi    Estimated Blood Loss:  1420ml    Cell Saver:  1050ml    Specimens:   ID Type Source Tests Collected by Time Destination   1 : Aortic Valve Leaflets Preservative Aortic Valve  Carrie Soares MD 11/22/2022 0920 Pathology   2 : Aorta Preservative Aorta  Carrie Soares MD 11/22/2022 1057 Pathology     Drains and pacing wires: 2 atrial wires, 1 bipolar ventricular wire, 3 lloyd drains    Complications: None    Findings: CAD, AS, Ascending aortic aneurysm    Implants:   Implant Name Type Inv.  Item Serial No.  Lot No. LRB No. Used Action   VALVE AORT 25MM REPL RESILIENT BOV PERICARD CO CHROMIUM ALLY - [id number]  VALVE AORT 25MM REPL RESILIENT BOV PERICARD CO CHROMIUM ALLY [id number] [facility]_WD [id number] N/A 1 Implanted   GRAFT VASC [facility] STR [id number] - [id number]  GRAFT VASC WOVN DBL [facility] STR [id number] Group 1 [employer] [facility]_WD [id number] N/A 1 Implanted

## 2022-11-22 NOTE — PERIOP NOTES
Received orders from [address] [place] [address] from OR to order 2 units PRBCs and have 4 units ahead.

## 2022-11-22 NOTE — PROGRESS NOTES
1445  Patient arrived into CCU from OR with OR team, Dr Azucena Chinchilla , and Radha AMBROCIO. Patient CABG x 1, AVR and ascending aortic aneurysm repair with epi, dex , propofol and insulin gtts infusing. Report received from CRNA at the bedside. Chest tube drainage so far 93cc. Labs, chest x-ray, EKG, and assessment completed. 1655  Pt beginning to wake up, calmed easily. Follows commands though barely squeezes hands. Pt nods yes to pain. Pt given PCA dose. 's. D/w Dr. Azucena Chinchilla. Will decrease epi to 2mcg and leave at 2mcg overnight. 1800  Pt placed on SBT 5/5 50%. Tolerating well. Dex decreased    1835  ABG drawn    1850 ABG results discussed with Dr. Vinny Chandler. Order received to extubate pt to BIPAP.   RT made aware    1917  Pt extubated to BIPAP

## 2022-11-23 ENCOUNTER — APPOINTMENT (OUTPATIENT)
Dept: GENERAL RADIOLOGY | Age: 42
End: 2022-11-23
Attending: NURSE PRACTITIONER
Payer: COMMERCIAL

## 2022-11-23 ENCOUNTER — APPOINTMENT (OUTPATIENT)
Dept: GENERAL RADIOLOGY | Age: 42
End: 2022-11-23
Attending: PHYSICIAN ASSISTANT
Payer: COMMERCIAL

## 2022-11-23 LAB
ACUTE KIDNEY INJURY RISK SCORE, AKIR: 1.44 (ref 0–0.3)
ADMINISTERED INITIALS, ADMINIT: NORMAL
ALBUMIN SERPL-MCNC: 3.2 G/DL (ref 3.5–5)
ALBUMIN SERPL-MCNC: 3.3 G/DL (ref 3.5–5)
ALBUMIN SERPL-MCNC: 3.4 G/DL (ref 3.5–5)
ALBUMIN/GLOB SERPL: 1.3 {RATIO} (ref 1.1–2.2)
ALBUMIN/GLOB SERPL: 1.3 {RATIO} (ref 1.1–2.2)
ALBUMIN/GLOB SERPL: 1.5 {RATIO} (ref 1.1–2.2)
ALP SERPL-CCNC: 72 U/L (ref 45–117)
ALP SERPL-CCNC: 73 U/L (ref 45–117)
ALP SERPL-CCNC: 91 U/L (ref 45–117)
ALT SERPL-CCNC: 102 U/L (ref 12–78)
ALT SERPL-CCNC: 107 U/L (ref 12–78)
ALT SERPL-CCNC: 141 U/L (ref 12–78)
ANION GAP SERPL CALC-SCNC: 6 MMOL/L (ref 5–15)
ANION GAP SERPL CALC-SCNC: 6 MMOL/L (ref 5–15)
ANION GAP SERPL CALC-SCNC: 7 MMOL/L (ref 5–15)
ANION GAP SERPL CALC-SCNC: 7 MMOL/L (ref 5–15)
APPEARANCE UR: ABNORMAL
AST SERPL-CCNC: 263 U/L (ref 15–37)
AST SERPL-CCNC: 267 U/L (ref 15–37)
AST SERPL-CCNC: 349 U/L (ref 15–37)
BACTERIA URNS QL MICRO: ABNORMAL /HPF
BILIRUB SERPL-MCNC: 0.7 MG/DL (ref 0.2–1)
BILIRUB SERPL-MCNC: 0.8 MG/DL (ref 0.2–1)
BILIRUB SERPL-MCNC: 1.5 MG/DL (ref 0.2–1)
BILIRUB UR QL CFM: POSITIVE
BNP SERPL-MCNC: 2387 PG/ML
BUN SERPL-MCNC: 26 MG/DL (ref 6–20)
BUN SERPL-MCNC: 27 MG/DL (ref 6–20)
BUN SERPL-MCNC: 34 MG/DL (ref 6–20)
BUN SERPL-MCNC: 34 MG/DL (ref 6–20)
BUN/CREAT SERPL: 13 (ref 12–20)
BUN/CREAT SERPL: 13 (ref 12–20)
BUN/CREAT SERPL: 15 (ref 12–20)
BUN/CREAT SERPL: 15 (ref 12–20)
CALCIUM SERPL-MCNC: 8.7 MG/DL (ref 8.5–10.1)
CALCIUM SERPL-MCNC: 9 MG/DL (ref 8.5–10.1)
CALCIUM SERPL-MCNC: 9.1 MG/DL (ref 8.5–10.1)
CALCIUM SERPL-MCNC: 9.4 MG/DL (ref 8.5–10.1)
CHLORIDE SERPL-SCNC: 104 MMOL/L (ref 97–108)
CHLORIDE SERPL-SCNC: 104 MMOL/L (ref 97–108)
CHLORIDE SERPL-SCNC: 110 MMOL/L (ref 97–108)
CHLORIDE SERPL-SCNC: 110 MMOL/L (ref 97–108)
CO2 SERPL-SCNC: 23 MMOL/L (ref 21–32)
CO2 SERPL-SCNC: 23 MMOL/L (ref 21–32)
CO2 SERPL-SCNC: 25 MMOL/L (ref 21–32)
CO2 SERPL-SCNC: 26 MMOL/L (ref 21–32)
COLOR UR: ABNORMAL
CREAT SERPL-MCNC: 1.94 MG/DL (ref 0.7–1.3)
CREAT SERPL-MCNC: 2.01 MG/DL (ref 0.7–1.3)
CREAT SERPL-MCNC: 2.22 MG/DL (ref 0.7–1.3)
CREAT SERPL-MCNC: 2.3 MG/DL (ref 0.7–1.3)
D50 ADMINISTERED, D50ADM: 0 ML
D50 ORDER, D50ORD: 0 ML
EPITH CASTS URNS QL MICRO: ABNORMAL /LPF
ERYTHROCYTE [DISTWIDTH] IN BLOOD BY AUTOMATED COUNT: 14.8 % (ref 11.5–14.5)
GLOBULIN SER CALC-MCNC: 2.2 G/DL (ref 2–4)
GLOBULIN SER CALC-MCNC: 2.5 G/DL (ref 2–4)
GLOBULIN SER CALC-MCNC: 2.6 G/DL (ref 2–4)
GLUCOSE BLD STRIP.AUTO-MCNC: 102 MG/DL (ref 65–117)
GLUCOSE BLD STRIP.AUTO-MCNC: 108 MG/DL (ref 65–117)
GLUCOSE BLD STRIP.AUTO-MCNC: 110 MG/DL (ref 65–117)
GLUCOSE BLD STRIP.AUTO-MCNC: 112 MG/DL (ref 65–117)
GLUCOSE BLD STRIP.AUTO-MCNC: 114 MG/DL (ref 65–117)
GLUCOSE BLD STRIP.AUTO-MCNC: 115 MG/DL (ref 65–117)
GLUCOSE BLD STRIP.AUTO-MCNC: 116 MG/DL (ref 65–117)
GLUCOSE BLD STRIP.AUTO-MCNC: 122 MG/DL (ref 65–117)
GLUCOSE BLD STRIP.AUTO-MCNC: 123 MG/DL (ref 65–117)
GLUCOSE BLD STRIP.AUTO-MCNC: 127 MG/DL (ref 65–117)
GLUCOSE BLD STRIP.AUTO-MCNC: 147 MG/DL (ref 65–117)
GLUCOSE BLD STRIP.AUTO-MCNC: 158 MG/DL (ref 65–117)
GLUCOSE BLD STRIP.AUTO-MCNC: 163 MG/DL (ref 65–117)
GLUCOSE BLD STRIP.AUTO-MCNC: 174 MG/DL (ref 65–117)
GLUCOSE BLD STRIP.AUTO-MCNC: 203 MG/DL (ref 65–117)
GLUCOSE BLD STRIP.AUTO-MCNC: 94 MG/DL (ref 65–117)
GLUCOSE BLD STRIP.AUTO-MCNC: 96 MG/DL (ref 65–117)
GLUCOSE BLD STRIP.AUTO-MCNC: 97 MG/DL (ref 65–117)
GLUCOSE SERPL-MCNC: 109 MG/DL (ref 65–100)
GLUCOSE SERPL-MCNC: 124 MG/DL (ref 65–100)
GLUCOSE SERPL-MCNC: 132 MG/DL (ref 65–100)
GLUCOSE SERPL-MCNC: 190 MG/DL (ref 65–100)
GLUCOSE UR STRIP.AUTO-MCNC: NEGATIVE MG/DL
GLUCOSE, GLC: 102 MG/DL
GLUCOSE, GLC: 108 MG/DL
GLUCOSE, GLC: 112 MG/DL
GLUCOSE, GLC: 114 MG/DL
GLUCOSE, GLC: 115 MG/DL
GLUCOSE, GLC: 115 MG/DL
GLUCOSE, GLC: 116 MG/DL
GLUCOSE, GLC: 122 MG/DL
GLUCOSE, GLC: 123 MG/DL
GLUCOSE, GLC: 127 MG/DL
GLUCOSE, GLC: 127 MG/DL
GLUCOSE, GLC: 147 MG/DL
GLUCOSE, GLC: 158 MG/DL
GLUCOSE, GLC: 163 MG/DL
GLUCOSE, GLC: 174 MG/DL
GLUCOSE, GLC: 203 MG/DL
GLUCOSE, GLC: 94 MG/DL
GLUCOSE, GLC: 96 MG/DL
GLUCOSE, GLC: 97 MG/DL
HCT VFR BLD AUTO: 32.8 % (ref 36.6–50.3)
HGB BLD-MCNC: 10.8 G/DL (ref 12.1–17)
HGB UR QL STRIP: ABNORMAL
HIGH TARGET, HITG: 130 MG/DL
INSULIN ADMINSTERED, INSADM: 11 UNITS/HOUR
INSULIN ADMINSTERED, INSADM: 11.7 UNITS/HOUR
INSULIN ADMINSTERED, INSADM: 13 UNITS/HOUR
INSULIN ADMINSTERED, INSADM: 15.1 UNITS/HOUR
INSULIN ADMINSTERED, INSADM: 16.9 UNITS/HOUR
INSULIN ADMINSTERED, INSADM: 17.9 UNITS/HOUR
INSULIN ADMINSTERED, INSADM: 21 UNITS/HOUR
INSULIN ADMINSTERED, INSADM: 23.5 UNITS/HOUR
INSULIN ADMINSTERED, INSADM: 4.9 UNITS/HOUR
INSULIN ADMINSTERED, INSADM: 6.3 UNITS/HOUR
INSULIN ADMINSTERED, INSADM: 6.4 UNITS/HOUR
INSULIN ADMINSTERED, INSADM: 7.6 UNITS/HOUR
INSULIN ADMINSTERED, INSADM: 8.1 UNITS/HOUR
INSULIN ADMINSTERED, INSADM: 8.6 UNITS/HOUR
INSULIN ADMINSTERED, INSADM: 9.4 UNITS/HOUR
INSULIN ADMINSTERED, INSADM: 9.5 UNITS/HOUR
INSULIN ADMINSTERED, INSADM: 9.7 UNITS/HOUR
INSULIN ADMINSTERED, INSADM: 9.9 UNITS/HOUR
INSULIN ADMINSTERED, INSADM: 9.9 UNITS/HOUR
INSULIN ORDER, INSORD: 11 UNITS/HOUR
INSULIN ORDER, INSORD: 11.7 UNITS/HOUR
INSULIN ORDER, INSORD: 13 UNITS/HOUR
INSULIN ORDER, INSORD: 15.1 UNITS/HOUR
INSULIN ORDER, INSORD: 16.9 UNITS/HOUR
INSULIN ORDER, INSORD: 17.9 UNITS/HOUR
INSULIN ORDER, INSORD: 21 UNITS/HOUR
INSULIN ORDER, INSORD: 23.5 UNITS/HOUR
INSULIN ORDER, INSORD: 4.9 UNITS/HOUR
INSULIN ORDER, INSORD: 6.3 UNITS/HOUR
INSULIN ORDER, INSORD: 6.4 UNITS/HOUR
INSULIN ORDER, INSORD: 7.6 UNITS/HOUR
INSULIN ORDER, INSORD: 8.1 UNITS/HOUR
INSULIN ORDER, INSORD: 8.6 UNITS/HOUR
INSULIN ORDER, INSORD: 9.4 UNITS/HOUR
INSULIN ORDER, INSORD: 9.5 UNITS/HOUR
INSULIN ORDER, INSORD: 9.7 UNITS/HOUR
INSULIN ORDER, INSORD: 9.9 UNITS/HOUR
INSULIN ORDER, INSORD: 9.9 UNITS/HOUR
KETONES UR QL STRIP.AUTO: ABNORMAL MG/DL
LEUKOCYTE ESTERASE UR QL STRIP.AUTO: ABNORMAL
LOW TARGET, LOT: 95 MG/DL
MAGNESIUM SERPL-MCNC: 2.4 MG/DL (ref 1.6–2.4)
MCH RBC QN AUTO: 27.3 PG (ref 26–34)
MCHC RBC AUTO-ENTMCNC: 32.9 G/DL (ref 30–36.5)
MCV RBC AUTO: 83 FL (ref 80–99)
MINUTES UNTIL NEXT BG, NBG: 120 MIN
MINUTES UNTIL NEXT BG, NBG: 120 MIN
MINUTES UNTIL NEXT BG, NBG: 60 MIN
MULTIPLIER, MUL: 0.14
MULTIPLIER, MUL: 0.14
MULTIPLIER, MUL: 0.15
MULTIPLIER, MUL: 0.15
MULTIPLIER, MUL: 0.16
MULTIPLIER, MUL: 0.17
MULTIPLIER, MUL: 0.18
MULTIPLIER, MUL: 0.19
MULTIPLIER, MUL: 0.19
NITRITE UR QL STRIP.AUTO: NEGATIVE
NRBC # BLD: 0 K/UL (ref 0–0.01)
NRBC BLD-RTO: 0 PER 100 WBC
ORDER INITIALS, ORDINIT: NORMAL
PH UR STRIP: 5 [PH] (ref 5–8)
PLATELET # BLD AUTO: 158 K/UL (ref 150–400)
PMV BLD AUTO: 9.7 FL (ref 8.9–12.9)
POTASSIUM SERPL-SCNC: 5.4 MMOL/L (ref 3.5–5.1)
POTASSIUM SERPL-SCNC: 5.6 MMOL/L (ref 3.5–5.1)
POTASSIUM SERPL-SCNC: 5.6 MMOL/L (ref 3.5–5.1)
POTASSIUM SERPL-SCNC: 5.8 MMOL/L (ref 3.5–5.1)
PROT SERPL-MCNC: 5.4 G/DL (ref 6.4–8.2)
PROT SERPL-MCNC: 5.8 G/DL (ref 6.4–8.2)
PROT SERPL-MCNC: 6 G/DL (ref 6.4–8.2)
PROT UR STRIP-MCNC: 100 MG/DL
RBC # BLD AUTO: 3.95 M/UL (ref 4.1–5.7)
RBC #/AREA URNS HPF: ABNORMAL /HPF (ref 0–5)
SERVICE CMNT-IMP: ABNORMAL
SERVICE CMNT-IMP: NORMAL
SODIUM SERPL-SCNC: 136 MMOL/L (ref 136–145)
SODIUM SERPL-SCNC: 136 MMOL/L (ref 136–145)
SODIUM SERPL-SCNC: 139 MMOL/L (ref 136–145)
SODIUM SERPL-SCNC: 140 MMOL/L (ref 136–145)
SP GR UR REFRACTOMETRY: 1.03
UROBILINOGEN UR QL STRIP.AUTO: 1 EU/DL (ref 0.2–1)
VANCOMYCIN SERPL-MCNC: 10 UG/ML
WBC # BLD AUTO: 20.6 K/UL (ref 4.1–11.1)
WBC URNS QL MICRO: ABNORMAL /HPF (ref 0–4)

## 2022-11-23 PROCEDURE — 81001 URINALYSIS AUTO W/SCOPE: CPT

## 2022-11-23 PROCEDURE — 74011250636 HC RX REV CODE- 250/636: Performed by: NURSE PRACTITIONER

## 2022-11-23 PROCEDURE — 97110 THERAPEUTIC EXERCISES: CPT | Performed by: OCCUPATIONAL THERAPIST

## 2022-11-23 PROCEDURE — 74011000250 HC RX REV CODE- 250: Performed by: THORACIC SURGERY (CARDIOTHORACIC VASCULAR SURGERY)

## 2022-11-23 PROCEDURE — 99233 SBSQ HOSP IP/OBS HIGH 50: CPT | Performed by: CLINICAL NURSE SPECIALIST

## 2022-11-23 PROCEDURE — 85027 COMPLETE CBC AUTOMATED: CPT

## 2022-11-23 PROCEDURE — 74011000250 HC RX REV CODE- 250: Performed by: PHYSICIAN ASSISTANT

## 2022-11-23 PROCEDURE — 80202 ASSAY OF VANCOMYCIN: CPT

## 2022-11-23 PROCEDURE — 74011250637 HC RX REV CODE- 250/637: Performed by: PHYSICIAN ASSISTANT

## 2022-11-23 PROCEDURE — 74011250636 HC RX REV CODE- 250/636: Performed by: THORACIC SURGERY (CARDIOTHORACIC VASCULAR SURGERY)

## 2022-11-23 PROCEDURE — 97161 PT EVAL LOW COMPLEX 20 MIN: CPT

## 2022-11-23 PROCEDURE — 74011000250 HC RX REV CODE- 250: Performed by: NURSE PRACTITIONER

## 2022-11-23 PROCEDURE — P9047 ALBUMIN (HUMAN), 25%, 50ML: HCPCS | Performed by: NURSE PRACTITIONER

## 2022-11-23 PROCEDURE — 97530 THERAPEUTIC ACTIVITIES: CPT | Performed by: OCCUPATIONAL THERAPIST

## 2022-11-23 PROCEDURE — 97110 THERAPEUTIC EXERCISES: CPT

## 2022-11-23 PROCEDURE — 97165 OT EVAL LOW COMPLEX 30 MIN: CPT | Performed by: OCCUPATIONAL THERAPIST

## 2022-11-23 PROCEDURE — 71045 X-RAY EXAM CHEST 1 VIEW: CPT

## 2022-11-23 PROCEDURE — 77010033678 HC OXYGEN DAILY

## 2022-11-23 PROCEDURE — 36415 COLL VENOUS BLD VENIPUNCTURE: CPT

## 2022-11-23 PROCEDURE — 74011250637 HC RX REV CODE- 250/637: Performed by: INTERNAL MEDICINE

## 2022-11-23 PROCEDURE — 80053 COMPREHEN METABOLIC PANEL: CPT

## 2022-11-23 PROCEDURE — 74011250636 HC RX REV CODE- 250/636: Performed by: PHYSICIAN ASSISTANT

## 2022-11-23 PROCEDURE — 74011636637 HC RX REV CODE- 636/637: Performed by: NURSE PRACTITIONER

## 2022-11-23 PROCEDURE — 74011250637 HC RX REV CODE- 250/637: Performed by: THORACIC SURGERY (CARDIOTHORACIC VASCULAR SURGERY)

## 2022-11-23 PROCEDURE — 74011000250 HC RX REV CODE- 250: Performed by: ANESTHESIOLOGY

## 2022-11-23 PROCEDURE — 74011000258 HC RX REV CODE- 258: Performed by: NURSE PRACTITIONER

## 2022-11-23 PROCEDURE — 97116 GAIT TRAINING THERAPY: CPT

## 2022-11-23 PROCEDURE — 74011000258 HC RX REV CODE- 258: Performed by: PHYSICIAN ASSISTANT

## 2022-11-23 PROCEDURE — 74011250636 HC RX REV CODE- 250/636: Performed by: INTERNAL MEDICINE

## 2022-11-23 PROCEDURE — 74011636637 HC RX REV CODE- 636/637: Performed by: INTERNAL MEDICINE

## 2022-11-23 PROCEDURE — 82962 GLUCOSE BLOOD TEST: CPT

## 2022-11-23 PROCEDURE — 83735 ASSAY OF MAGNESIUM: CPT

## 2022-11-23 PROCEDURE — 74011000250 HC RX REV CODE- 250: Performed by: INTERNAL MEDICINE

## 2022-11-23 PROCEDURE — 94660 CPAP INITIATION&MGMT: CPT

## 2022-11-23 PROCEDURE — 83880 ASSAY OF NATRIURETIC PEPTIDE: CPT

## 2022-11-23 PROCEDURE — 65610000006 HC RM INTENSIVE CARE

## 2022-11-23 RX ORDER — HYDROCODONE BITARTRATE AND ACETAMINOPHEN 7.5; 325 MG/1; MG/1
1 TABLET ORAL
Status: DISCONTINUED | OUTPATIENT
Start: 2022-11-23 | End: 2022-11-27

## 2022-11-23 RX ORDER — DEXTROSE MONOHYDRATE 100 MG/ML
250 INJECTION, SOLUTION INTRAVENOUS ONCE
Status: COMPLETED | OUTPATIENT
Start: 2022-11-23 | End: 2022-11-23

## 2022-11-23 RX ORDER — FUROSEMIDE 10 MG/ML
40 INJECTION INTRAMUSCULAR; INTRAVENOUS ONCE
Status: COMPLETED | OUTPATIENT
Start: 2022-11-23 | End: 2022-11-23

## 2022-11-23 RX ORDER — SODIUM BICARBONATE 1 MEQ/ML
100 SYRINGE (ML) INTRAVENOUS
Status: COMPLETED | OUTPATIENT
Start: 2022-11-23 | End: 2022-11-23

## 2022-11-23 RX ORDER — HYDROMORPHONE HYDROCHLORIDE 1 MG/ML
0.5 INJECTION, SOLUTION INTRAMUSCULAR; INTRAVENOUS; SUBCUTANEOUS
Status: DISCONTINUED | OUTPATIENT
Start: 2022-11-23 | End: 2022-12-03

## 2022-11-23 RX ORDER — VANCOMYCIN/0.9 % SOD CHLORIDE 1.5G/250ML
1500 PLASTIC BAG, INJECTION (ML) INTRAVENOUS ONCE
Status: COMPLETED | OUTPATIENT
Start: 2022-11-23 | End: 2022-11-23

## 2022-11-23 RX ORDER — HYDROCODONE BITARTRATE AND ACETAMINOPHEN 5; 325 MG/1; MG/1
1 TABLET ORAL
Status: DISCONTINUED | OUTPATIENT
Start: 2022-11-23 | End: 2022-11-27

## 2022-11-23 RX ORDER — ALBUMIN HUMAN 250 G/1000ML
12.5 SOLUTION INTRAVENOUS ONCE
Status: COMPLETED | OUTPATIENT
Start: 2022-11-23 | End: 2022-11-23

## 2022-11-23 RX ORDER — ACETAMINOPHEN 500 MG
500 TABLET ORAL
Status: DISCONTINUED | OUTPATIENT
Start: 2022-11-23 | End: 2022-11-27

## 2022-11-23 RX ORDER — SODIUM CHLORIDE 9 MG/ML
3 INJECTION, SOLUTION INTRAVENOUS CONTINUOUS
Status: DISCONTINUED | OUTPATIENT
Start: 2022-11-23 | End: 2022-12-05

## 2022-11-23 RX ORDER — BUMETANIDE 0.25 MG/ML
1 INJECTION INTRAMUSCULAR; INTRAVENOUS ONCE
Status: COMPLETED | OUTPATIENT
Start: 2022-11-23 | End: 2022-11-23

## 2022-11-23 RX ORDER — HYDROMORPHONE HYDROCHLORIDE 1 MG/ML
1 INJECTION, SOLUTION INTRAMUSCULAR; INTRAVENOUS; SUBCUTANEOUS
Status: DISCONTINUED | OUTPATIENT
Start: 2022-11-23 | End: 2022-12-03

## 2022-11-23 RX ADMIN — CALCIUM CHLORIDE 2 G: 100 INJECTION, SOLUTION INTRAVENOUS at 12:44

## 2022-11-23 RX ADMIN — SENNOSIDES AND DOCUSATE SODIUM 1 TABLET: 50; 8.6 TABLET ORAL at 09:07

## 2022-11-23 RX ADMIN — SODIUM CHLORIDE 0.5 MCG/KG/HR: 9 INJECTION, SOLUTION INTRAVENOUS at 03:58

## 2022-11-23 RX ADMIN — MAGNESIUM OXIDE TAB 400 MG (241.3 MG ELEMENTAL MG) 400 MG: 400 (241.3 MG) TAB at 09:07

## 2022-11-23 RX ADMIN — POLYETHYLENE GLYCOL 3350 17 G: 17 POWDER, FOR SOLUTION ORAL at 09:08

## 2022-11-23 RX ADMIN — MUPIROCIN: 20 OINTMENT TOPICAL at 17:33

## 2022-11-23 RX ADMIN — BUSPIRONE HYDROCHLORIDE 15 MG: 5 TABLET ORAL at 09:06

## 2022-11-23 RX ADMIN — SERTRALINE 100 MG: 50 TABLET, FILM COATED ORAL at 09:06

## 2022-11-23 RX ADMIN — HYDROMORPHONE HYDROCHLORIDE 0.5 MG: 1 INJECTION, SOLUTION INTRAMUSCULAR; INTRAVENOUS; SUBCUTANEOUS at 14:31

## 2022-11-23 RX ADMIN — FAMOTIDINE 20 MG: 10 INJECTION, SOLUTION INTRAVENOUS at 09:05

## 2022-11-23 RX ADMIN — VANCOMYCIN HYDROCHLORIDE 1500 MG: 10 INJECTION, POWDER, LYOPHILIZED, FOR SOLUTION INTRAVENOUS at 18:16

## 2022-11-23 RX ADMIN — ASPIRIN 81 MG CHEWABLE TABLET 81 MG: 81 TABLET CHEWABLE at 09:06

## 2022-11-23 RX ADMIN — HYDROMORPHONE HYDROCHLORIDE 1 MG: 1 INJECTION, SOLUTION INTRAMUSCULAR; INTRAVENOUS; SUBCUTANEOUS at 19:57

## 2022-11-23 RX ADMIN — SODIUM CHLORIDE, PRESERVATIVE FREE 10 ML: 5 INJECTION INTRAVENOUS at 23:12

## 2022-11-23 RX ADMIN — SODIUM CHLORIDE 15.1 UNITS/HR: 9 INJECTION, SOLUTION INTRAVENOUS at 11:22

## 2022-11-23 RX ADMIN — ONDANSETRON 4 MG: 2 INJECTION INTRAMUSCULAR; INTRAVENOUS at 09:14

## 2022-11-23 RX ADMIN — DEXTROSE MONOHYDRATE 250 ML: 100 INJECTION, SOLUTION INTRAVENOUS at 13:10

## 2022-11-23 RX ADMIN — SODIUM ZIRCONIUM CYCLOSILICATE 15 G: 10 POWDER, FOR SUSPENSION ORAL at 12:44

## 2022-11-23 RX ADMIN — Medication: at 09:10

## 2022-11-23 RX ADMIN — HYDROCODONE BITARTRATE AND ACETAMINOPHEN 1 TABLET: 7.5; 325 TABLET ORAL at 17:30

## 2022-11-23 RX ADMIN — FUROSEMIDE 40 MG: 10 INJECTION, SOLUTION INTRAMUSCULAR; INTRAVENOUS at 06:18

## 2022-11-23 RX ADMIN — ACETAMINOPHEN 1000 MG: 500 TABLET ORAL at 13:10

## 2022-11-23 RX ADMIN — ROSUVASTATIN CALCIUM 40 MG: 40 TABLET, FILM COATED ORAL at 09:06

## 2022-11-23 RX ADMIN — LEVOTHYROXINE SODIUM 125 MCG: 0.12 TABLET ORAL at 06:19

## 2022-11-23 RX ADMIN — ACETAMINOPHEN 1000 MG: 500 TABLET ORAL at 06:19

## 2022-11-23 RX ADMIN — MAGNESIUM OXIDE TAB 400 MG (241.3 MG ELEMENTAL MG) 400 MG: 400 (241.3 MG) TAB at 17:30

## 2022-11-23 RX ADMIN — SODIUM BICARBONATE 100 MEQ: 84 INJECTION INTRAVENOUS at 13:10

## 2022-11-23 RX ADMIN — BUMETANIDE 1 MG: 0.25 INJECTION, SOLUTION INTRAMUSCULAR; INTRAVENOUS at 17:29

## 2022-11-23 RX ADMIN — CHLORHEXIDINE GLUCONATE 0.12% ORAL RINSE 10 ML: 1.2 LIQUID ORAL at 09:05

## 2022-11-23 RX ADMIN — AMIODARONE HYDROCHLORIDE 400 MG: 200 TABLET ORAL at 17:30

## 2022-11-23 RX ADMIN — AMIODARONE HYDROCHLORIDE 400 MG: 200 TABLET ORAL at 09:05

## 2022-11-23 RX ADMIN — ALBUMIN HUMAN 12.5 G: 0.25 SOLUTION INTRAVENOUS at 04:02

## 2022-11-23 RX ADMIN — MUPIROCIN: 20 OINTMENT TOPICAL at 09:08

## 2022-11-23 RX ADMIN — LORAZEPAM 1 MG: 1 TABLET ORAL at 04:11

## 2022-11-23 RX ADMIN — CHLORHEXIDINE GLUCONATE 0.12% ORAL RINSE 10 ML: 1.2 LIQUID ORAL at 17:32

## 2022-11-23 RX ADMIN — Medication 10 UNITS: at 12:00

## 2022-11-23 RX ADMIN — NALOXONE HYDROCHLORIDE 0.4 MG: 0.4 INJECTION, SOLUTION INTRAMUSCULAR; INTRAVENOUS; SUBCUTANEOUS at 14:24

## 2022-11-23 RX ADMIN — BUSPIRONE HYDROCHLORIDE 15 MG: 5 TABLET ORAL at 23:12

## 2022-11-23 RX ADMIN — SODIUM CHLORIDE 11.7 UNITS/HR: 9 INJECTION, SOLUTION INTRAVENOUS at 20:37

## 2022-11-23 RX ADMIN — SODIUM CHLORIDE 50 ML/HR: 9 INJECTION, SOLUTION INTRAVENOUS at 00:56

## 2022-11-23 RX ADMIN — SODIUM CHLORIDE, PRESERVATIVE FREE 10 ML: 5 INJECTION INTRAVENOUS at 13:17

## 2022-11-23 RX ADMIN — BUSPIRONE HYDROCHLORIDE 15 MG: 5 TABLET ORAL at 13:10

## 2022-11-23 NOTE — PROGRESS NOTES
Comprehensive Nutrition Assessment    Type and Reason for Visit: Initial, RD nutrition re-screen/LOS    Nutrition Recommendations/Plan:   Continue diet advancement per CTS team (recommend 2000 kcal CHO controlled/Cardiac)   Resume Ensure High Protein BID once advanced past clears   If hyperkalemia continues will also need Low K diet rxn and change to Nepro ONS     Malnutrition Assessment:  Malnutrition Status:  No malnutrition (11/23/22 1549)        Nutrition Assessment:  Pt admitted with NSTEMI. PMH: CAD, DM, HTN, GERD. Chart reviewed for LOS, case discussed during CCU rounds. Pt sleeping soundly at time of visit, no family in the room to speak with. He is s/p CABG x 1. MST not filled out. Appetite appears to have been fairly good preop. He is c/o nausea today and thus not eating much. Nephrology consulted for poor renal function postop, K 5.8 this afternoon. -203 despite glucostabilizer, diabetes team is following and making recommendations. Patient Vitals for the past 168 hrs:   % Diet Eaten   11/19/22 1751 76 - 100%   11/19/22 1112 76 - 100%   11/19/22 0738 76 - 100%   11/18/22 1900 76 - 100%   11/18/22 1711 51 - 75%   11/18/22 0915 51 - 75%   11/18/22 0725 0%            Nutrition Related Findings:    Meds: humalog, magox, protonix, miralax, pericolace, vancomycin; Drips: insulin. Edema: +3-all extremities. BM 11/20 Wound Type: Surgical incision    Current Nutrition Intake & Therapies:  Average Meal Intake: 1-25% (great cirilo preop)     ADULT ORAL NUTRITION SUPPLEMENT Breakfast, Dinner; Low Calorie/High Protein  ADULT DIET Clear Liquid; 4 carb choices (60 gm/meal)    Anthropometric Measures:  Height: 5' 9\" (175.3 cm)  Ideal Body Weight (IBW): 160 lbs (73 kg)     Current Body Wt:  124.5 kg (274 lb 7.6 oz), 171.5 % IBW.  Bed scale  Current BMI (kg/m2): 40.5                          BMI Category: Obese class 3 (BMI 40.0 or greater)    Estimated Daily Nutrient Needs:  Energy Requirements Based On: Formula  Weight Used for Energy Requirements: Current  Energy (kcal/day): MSJ 2000 (2131 x 1.2 -500 for obesity)  Weight Used for Protein Requirements: Ideal  Protein (g/day): 109g (1.5gPro/kg IBW)  Method Used for Fluid Requirements: 1 ml/kcal  Fluid (ml/day): 2000mL    Nutrition Diagnosis:   No nutrition diagnosis at this time     Nutrition Interventions:   Food and/or Nutrient Delivery: Continue current diet, Modify current diet, Start oral nutrition supplement  Nutrition Education/Counseling: No recommendations at this time  Coordination of Nutrition Care: Continue to monitor while inpatient, Interdisciplinary rounds       Goals:     Goals: PO intake 50% or greater, by next RD assessment (advance to solids)       Nutrition Monitoring and Evaluation:   Behavioral-Environmental Outcomes: None identified  Food/Nutrient Intake Outcomes: Diet advancement/tolerance, Food and nutrient intake, Supplement intake  Physical Signs/Symptoms Outcomes: Biochemical data, Nutrition focused physical findings, Skin, Weight, Nausea/vomiting    Discharge Planning:     Too soon to determine    Lubna Chandler RD, CNSC  Contact: ext [phone]

## 2022-11-23 NOTE — INTERDISCIPLINARY ROUNDS
Interdisciplinary team rounds were held 11/23/2022 with the following team members:Care Management, Diabetes Treatment Specialist, Nursing, Nutrition, Pharmacy, Physician, and Clinical Coordinator. Plan of care discussed. See clinical pathway and/or care plan for interventions and desired outcomes. Goals of the Day: Remains on EPI gtt.

## 2022-11-23 NOTE — PROGRESS NOTES
35 y/o m pt admitted to the ICU s/p CABG x1, AVR, and ascending aortic aneurysm repair on 11/22.    11/22-11/23 overnight: pt having elevated kidney biomarkers, worsening SERGE despite gentle fluid boluses (Cr 2.01 up from baseline of 0.97 and K 5.6). Received 250cc LR x2 and 12.5 g of 25% albumin. Also developing a transaminitis (/ ). CVP this am 13 (up from 7 earlier in the night) and net positive 2 L for the shift with an average UO of 30 cc/hr. CXR showed stable mild pulmonary edema and pro-BNP 2387 (up from 208 on 11/15). I am concerned that SERGE and transaminitis is possibly due to RV dysfunction since SYLWIA intra op post procedure showed RV dysfunction and pt starting to have s/o fluid overload. Will give 40 of lasix now. TTE and nephrology consult ordered for today. Case discussed with on-call CT surgeon, Dr. Lidia Zheng, who agreed with plan.     Charli Lung, NP  [facility]  11/23/22

## 2022-11-23 NOTE — CONSULTS
ICU DAILY PROGRESS NOTE      Summary  37 y/o male POD 1 from CABG x1 an AV valve replacement. He arrived intubated and on pressors. Extubated 11/22 on POD 0 @ 19:00. Noted to have SERGE with creatinine rise 2. Possibly secondary to hypotension in OR. Previous 24 Hours    On pump CORONARY ARTERY BYPASS GRAFT  X 1 WITH LEFT INTERNAL MAMMARY ARTERY to LAD  AORTIC VALVE REPLACEMENT WITH MEYER 25MM INSPIRIS RESILIA TISSUE VALVE   REPAIR OF ASCENDING AORTIC ANEURYSM with 26mm hemashield graft    [facility] Day 8    Critical Care Problems    Patient Active Problem List   Diagnosis Code    DM (diabetes mellitus) ([facility] E11.9    Acquired hypothyroidism E03.9    Essential hypertension I10    Fibromyalgia M79.7    Microalbuminuria R80.9    Anxiety F41.9    Migraine without aura G43.009    Hypertriglyceridemia E78.1    Severe obesity ([facility]) E66.01    Transient ischemic attack involving left internal carotid artery G45. 1    Chest pain R07.9    Unstable angina ([facility]) I20.0    Coronary artery disease involving native coronary artery of native heart with unstable angina pectoris ([facility]) I25.110    Type 2 diabetes with nephropathy ([facility]) E11.21    Dysthymia F34.1    Adjustment disorder with mixed emotional features F43.29    Anxiety disorder due to general medical condition with panic attack F06.4, F41.0    Insomnia disorder with non-sleep disorder mental comorbidity G47.00    Dizzy spells R42    Multiple lacunar infarcts ([facility]) I63.81    Cervical spondylosis with radiculopathy M47.22    Low back pain M54.50    Corneal abrasion, right S05. 01XA    Stable proliferative diabetic retinopathy of both eyes associated with type 1 diabetes mellitus ([facility] G26.5796    Bilateral carotid artery stenosis I65.23    Acute non-Q wave non-ST elevation myocardial infarction (NSTEMI) ([facility]) I21.4    Aortic stenosis I35.0    CAD (coronary artery disease) I25.10    S/P CABG x 1 Z95.1    S/P AVR (aortic valve replacement) and aortoplasty Z95.2         Plan    Neuro  -routine post op pain management  - stopped PCA because pt to sleepy and not doing deep breathing exercises. CVS  CAD s/p CABG  - wean pressors to maintain MAP > 65 and SVO2 > 65  - cont. amio    Pulm  Acute Postoperative respiratory failure- resolved  - extubated last night. - saturating well but not doing IS; may need to do intermittent bipap    GI  - on famotidine    Renal  SERGE  - Nephrology now following and reviewed their consult; Maintain perfusion and avoid any nephrotoxic agents.   -replete K and Mg as needed    Endo  Hyperglycemia  Hypothyroidism  - continue insulin drip, BS goal   - cont levothyroxine    ID  - complete periop vanc    Heme  -DVT prophylaxis as per CT surgery    Vitals  Visit Vitals  BP (!) 105/56   Pulse 83   Temp (!) 100.9 °F (38.3 °C)   Resp 27   Ht 5' 9\" (1.753 m)   Wt 124.5 kg (274 lb 7.6 oz)   SpO2 96%   BMI 40.53 kg/m²    O2 Flow Rate (L/min): 6 l/min O2 Device: Nasal cannula Temp (24hrs), Av.5 °F (37.5 °C), Min:97.8 °F (36.6 °C), Max:101.1 °F (38.4 °C)    CVP (mmHg): 14 mmHg (22 0900)      Intake/Output:     Intake/Output Summary (Last 24 hours) at 2022 1152  Last data filed at 2022 0900  Gross per 24 hour   Intake 5894.88 ml   Output 3925 ml   Net 1969.88 ml           Physical exam:        I have examined the patient on this day 2022 and the above documented exam is accurate including the components that have been copied forward    LABS AND  DATA: Personally reviewed  Recent Labs     22   WBC 20.6*  --    HGB 10.8* 11.7*   HCT 32.8* 34.4*     --        Recent Labs     22  0456 22    140 141   K 5.6* 5.6* 4.8   * 110* 110*   CO2    BUN 27* 26* 22*   CREA 2.01* 1.94* 1.62*   * 132* 176*   CA 8.7 9.0 9.1   MG  --  2.4 2.7*       Recent Labs     22  0456 22  0249   AP 72 73   TP 6.0* 5.8*   ALB 3.4* 3.3*   GLOB 2.6 2.5     Recent Labs     11/22/22 1513   INR 1.1   PTP 11.2*   APTT 26.4        No results for input(s): PHI, PCO2I, PO2I, FIO2I in the last 72 hours. No results for input(s): CPK, CKMB, TROIQ, BNPP in the last 72 hours. Hemodynamics:   PAP: PAP Systolic: 30 (70/29/56 6849) CO: CO (l/min): 4.9 l/min (11/23/22 0900)   Wedge:   CI: CI (l/min/m2): 2.1 l/min/m2 (11/23/22 0900)   CVP:  CVP (mmHg): 14 mmHg (11/23/22 0900) SVR:       PVR:       Ventilator Settings:  Mode Rate Tidal Volume Pressure FiO2 PEEP   NIPPV   450 ml  5 cm H2O 50 % 5 cm H20     Peak airway pressure: 13 cm H2O    Minute ventilation: 11.4 l/min        MEDS: Reviewed    Chest X-Ray:  CXR Results  (Last 48 hours)                 11/23/22 0453  XR CHEST PORT Final result    Impression:      Stable mild pulmonary edema. Narrative:  EXAM:  XR CHEST PORT       INDICATION: Mild pulmonary edema       COMPARISON: 11/22/2022 at 1513 hours       TECHNIQUE: Portable AP semiupright chest view at 0426 hours       FINDINGS: The endotracheal tube and enteric tube have been removed. The right IJ   pulmonary artery catheter, mediastinal drain, and left chest tube are stable. The cardiomediastinal contours are stable. There are stable mild diffuse interstitial opacities. There is no pleural   effusion or pneumothorax. The bones and upper abdomen are stable. 11/22/22 1523  XR CHEST PORT Final result    Impression:  New postsurgical changes. Tubes and lines in satisfactory position. Mild   pulmonary fluid overload. Narrative:  INDICATION:    post op heart        EXAMINATION:  AP CHEST, PORTABLE       COMPARISON: November 15       FINDINGS: Single AP portable view of the chest at 1513 hours demonstrates   interval median sternotomy, intubation, and placement of a NG tube in the   stomach. There is also a right IJ line, with a [place] catheter tip in the   main pulmonary outflow tract. There is evidence of chest/mediastinal drains. There is no evidence of pneumothorax. There is mild pulmonary fluid overload. The cardiomediastinal silhouette is stable. There is no new airspace disease. Multidisciplinary Rounds Completed:  N/A      DISPOSITION  Stay in ICU    CRITICAL CARE CONSULTANT NOTE  I had a in-person encounter with Pino Valenzuela, reviewed and interpreted patient data including events, labs, images, vital signs, I/O's, and examined patient. I have discussed the case and the plan and management of the patient's care with the consulting services, the bedside nurses and the respiratory therapist.      NOTE OF PERSONAL INVOLVEMENT IN CARE   This patient is at high risk for sudden and clinically significant deterioration, which requires the highest level of preparedness to intervene urgently. I participated in the decision-making and personally managed or directed the management of the following life and organ supporting interventions that required my frequent assessment to treat or prevent imminent deterioration. I personally spent 40 minutes of critical care time. This is time spent at patient's bedside actively involved in patient care as well as the coordination of care and discussions with the patient's family. This does not include any procedural time which has been billed separately.       ------------------------------------------------------------------------------    Ariella Jorge MD, PhD  [address]  [phone]

## 2022-11-23 NOTE — DIABETES MGMT
[address] [facility]    CLINICAL NURSE SPECIALIST CONSULT     Initial Presentation   Tyson Garcia is a 36 y.o. male admitted 11/15/22 after experiencing chest pain. Afebrile. Hypertensive. 02 sats 100%  LAB: WBC 8.7. Normal H&H. /AG 3. Normal kidney & liver parameters. NT pro-; troponin 714  CXR:  Mild interstitial pulmonary edema versus interstitial pneumonia. No   pneumothorax. Consider PA and lateral chest views when the patient can better   tolerate. HX:   Past Medical History:   Diagnosis Date    Anxiety and depression     anxiety, depression    Bleeding of eye, left     8/17/21 pt reports receiving injections in right eye for beeding    Chronic headaches 2007    COVID-19 12/20/2020    Diabetes type 1, uncontrolled     since 8years old    GERD (gastroesophageal reflux disease)     Hypercholesterolemia     Hypertension     Hypothyroidism     MI (myocardial infarction) ([facility]     as of 8/17/21 pt reports 9 stents total    WALLY on CPAP       INITIAL DX:   Acute non-Q wave non-ST elevation myocardial infarction (NSTEMI) ([facility] [I21.4]  Aortic stenosis [I35.0]  CAD (coronary artery disease) [I25.10]     Current Treatment     TX: 11/22/22 On pump CORONARY ARTERY BYPASS GRAFT  X 1 WITH LEFT INTERNAL MAMMARY ARTERY to LAD  AORTIC VALVE REPLACEMENT WITH MEYER 25MM INSPIRIS RESILIA TISSUE VALVE   REPAIR OF ASCENDING AORTIC ANEURYSM with 26mm hemashield graft    Consulted by Provider for advanced diabetes nursing assessment and care for:   [x] Transitioning off Ranelle Stalling   [x] Inpatient management strategy  [] Home management assessment  [] Survival skill education    Hospital Course   Clinical progress has been complicated by need for ICU level of care.      Diabetes History   Type 1 diabetes since age 8; hospitalized at that time and discharged on insulin therapy  Family history positive for both Type 1 & 2 diabetes  Has been on a variety of insulin regimens over the years  Sees Krishan Nguyen MD (endocrinologist) for diabetes care    Diabetes-related Medical History  Acute complications  DKA  Neurological complications  Peripheral neuropathy  Microvascular disease  Retinopathy; loss of vision in left eye  Macrovascular disease  CAD, MI  Other  Sleep apnea    Diabetes Medication History  Key Antihyperglycemic Medications               metFORMIN (GLUCOPHAGE) 500 mg tablet (Taking) TAKE 1 TABLET BY MOUTH TWICE DAILY WITH MEALS    insulin glargine U-300 conc (Toujeo Max U-300 SoloStar) 300 unit/mL (3 mL) inpn (Taking) Take 140 units every day (new dosage)    insulin regular (NOVOLIN R, HUMULIN R) 100 unit/mL injection (Taking) 40 units with each meal, plus correction. Max daily dose of 150 units. Diabetes self-management practices:   Eating pattern - Eats 3 meals on most days and snacks in the evening   [x] Not eating a carbohydrate-controlled mealplan  Physical activity pattern   [x] Not employing a physical activity program to control BG  Monitoring pattern   [x] Testing BGs   [x] Breakfast 300s  [x] Lunch  100-200s  [x] Dinner  100-200s  Taking medications pattern  [x] Consistent administration  [x] Affordable  Overall evaluation:    [x] Not achieving A1c target with drug therapy & self-care practices    Subjective   I 've had diabetes since I was [age] (7).      Objective   Physical exam  General Obese male in no acute distress.  Conversant and cooperative  Neuro  Alert, oriented   Vital Signs Visit Vitals  BP (!) 105/56   Pulse 83   Temp (!) 100.9 °F (38.3 °C)   Resp 27   Ht 5' 9\" (1.753 m)   Wt 124.5 kg (274 lb 7.6 oz)   SpO2 96%   BMI 40.53 kg/m²     Laboratory  Recent Labs     11/23/22  0456 11/23/22  0249 11/22/22  1842   * 132* 176*   AGAP 6 7 8   WBC  --  20.6*  --    CREA 2.01* 1.94* 1.62*   * 263* 172*   * 107* 109*     Factors impacting BG management  Factor Dose Comments   Nutrition:  CL meals    Not consuming much of anything Drugs:  Vasopressor load  Atypical antipsychotics   Epi infusion  Buspar 3XB. Zoloft D   Affects insulin delivery     Pain Dilaudid infusion    Infection Vanc Q12 hrs Fever. WBC elevated   Other:   Kidney function  Liver function  NT pro-BNP   eGFR 43/creatinine 1.9  Liver enzymes elevated  2387      Blood glucose pattern      Significant diabetes-related events over the past 24-72 hours  11/15/22 Admission   Total insulin requirements without achieving target Bgs until 11/20/22:    11/21/22 Started on Jalaine Lamin  11/23/22 Using 5-10 units/hr of insulin this morning    Assessment and Plan   Nursing Diagnosis Risk for unstable blood glucose pattern   Nursing Intervention Domain 5255 Decision-making Support   Nursing Interventions Examined current inpatient diabetes/blood glucose control   Explored factors facilitating and impeding inpatient management  Explored corrective strategies with patient and responsible inpatient provider   Informed patient of rational for insulin strategy while hospitalized     Evaluation   This 36year old  male with known CAD was admitted with chest pain; he underwent CABG  & AVR. Patient has known Type 1 diabetes with poor control going into this surgery. Patient required 260 units/D prior to the surgery (during this admission) without consistently achieving BG targets. Has been on Glucostabilizer since surgery; has been requiring less & less insulin overnight. Now at 5 units/hour which equates to his pre-admission insulin dosing per Dr. Adithya Hill of 1 unit/kg/D. This patient would benefit from diabetes self-management education and support [facility]) after discharge.     Recommendations     [x] When ready to transition off Glucostabilizer, use of Subcutaneous Insulin Order set (7328)  Insulin Dosing Specific recommendation   Basal                                      (Based on weight, BMI & GFR) [x]        1 unit/kg/D Lantus insulin 120 units daily OR split into 60 twice daily   Nutritional                                      (Based on CHO/dextrose load) [x] Insulin-resistant sensitivity Humalog 20-25 units with each consumed meal of >50%   Corrective                                       (Useful in adjusting insulin dosing) [x] Insulin-resistant sensitivity      [x] Referral to  [x] Program [facility] (Phone [phone] to schedule appointment) for [facility]    Billing Code(s)   [x] 89676 IP subsequent hospital care - 35 minutes     Before making these care recommendations, I personally reviewed the hospitalization record, including notes, laboratory & diagnostic data and current medications, and examined the patient at the bedside (circumstances permitting) before making care recommendations. More than fifty (50) percent of the time was spent in patient counseling and/or care coordination.   Total minutes: [address], CNS  Diabetes Clinical Nurse Specialist  [facility]  Access via [facility]

## 2022-11-23 NOTE — PROGRESS NOTES
Problem: Self Care Deficits Care Plan (Adult)  Goal: *Acute Goals and Plan of Care (Insert Text)  Description:     FUNCTIONAL STATUS PRIOR TO ADMISSION: Patient was independent and active without use of DME.    HOME SUPPORT: The patient lived with his mother. Occupational Therapy Goals  Initiated 11/23/2022  1. Patient will perform grooming standing at sink with supervision/set-up within 7 day(s). 2.  Patient will perform upper body dressing with supervision/set-up within 7 day(s). 3.  Patient will perform lower body dressing with supervision/set-up within 7 day(s). 4.  Patient will perform toilet transfers with supervision/set-up within 7 day(s). 5.  Patient will perform all aspects of toileting with supervision/set-up within 7 day(s). Outcome: Not Met     OCCUPATIONAL THERAPY EVALUATION  Patient: Blu Gonzalez (56 y.o. male)  Date: 11/23/2022  Primary Diagnosis: Acute non-Q wave non-ST elevation myocardial infarction (NSTEMI) (HCC) [I21.4]  Aortic stenosis [I35.0]  CAD (coronary artery disease) [I25.10]  Procedure(s) (LRB):  SYLWIA BY DR Olga Carranza -  CORONARY ARTERY BYPASS GRAFT  X 1 WITH LEFT INTERNAL MAMMARY ARTERY GRAFTING - AORTIC VALVE REPLACEMENT WITH MEYER 25MM INSPIRIS RESILIA TISSUE VALVE AND REPAIR OF ASCENDING AORTIC ANEURYSM (N/A) 1 Day Post-Op   Precautions:   Sternal (move in the tube)    ASSESSMENT  Based on the objective data described below, the patient presents with post op day 1 for CABG x1, AVR and repair of AAA, now with post op drowsiness, sternal/move in the tube precautions, edema, expected sternal incision pain, GW, decreased activity tolerance, decreased safety awareness and decreased balance which is impairing his functional independence. The patient is functioning below his independent baseline, now performing ADLs at a min A to total A level and is CGA of 2 for functional mobility.  The patient will benefit from acute OT intervention and will likely need HHOT and PT with the assistance of family PRN after discharge pending progress. Functional Outcome Measure: The patient scored 10/100 on the Barthel Index outcome measure which is indicative of 90% impairment in function. PLAN :  Recommendations and Planned Interventions: self care training, functional mobility training, therapeutic exercise, balance training, therapeutic activities, endurance activities, patient education, home safety training, and family training/education    Frequency/Duration: Patient will be followed by occupational therapy 5 times a week to address goals.     Recommendation for discharge: (in order for the patient to meet his/her long term goals)  Occupational therapy at least 2 days/week in the home and support of family, pending progress    Equipment recommendations for successful discharge (if) home: Likely none       OBJECTIVE DATA SUMMARY:   HISTORY:   Past Medical History:   Diagnosis Date    Anxiety and depression     anxiety, depression    Bleeding of eye, left     8/17/21 pt reports receiving injections in right eye for beeding    Chronic headaches 2007    COVID-19 12/20/2020    Diabetes type 1, uncontrolled     since 8years old    GERD (gastroesophageal reflux disease)     Hypercholesterolemia     Hypertension     Hypothyroidism     MI (myocardial infarction) ([facility] [address]     as of 8/17/21 pt reports 9 stents total    WALLY on CPAP      Past Surgical History:   Procedure Laterality Date    HX CARPAL TUNNEL RELEASE Left 2012    HX CARPAL TUNNEL RELEASE Right 2018    CTE trigger thumb and index finger    HX HEART CATHETERIZATION      HX HEENT      HX LAP CHOLECYSTECTOMY  8/26/14    Dr Lizeth Meng    HX [id number] [facility] EXTRACTION         Expanded or extensive additional review of patient history:     Home Situation  Home Environment: Private residence  # Steps to Enter: 3  Rails to Enter: Yes  One/Two Story Residence: One story  Living Alone: No  Support Systems: Parent(s)  Patient Expects to be Discharged to[de-identified] Home  Current DME Used/Available at Home: None  Tub or Shower Type: Tub/Shower combination    Hand dominance: Right    EXAMINATION OF PERFORMANCE DEFICITS:  Cognitive/Behavioral Status:   Drowsy  Able to follow commands  Oriented X 4     Vision/Perceptual:    Acuity: Within Defined Limits         Range of Motion:  AROM: Grossly decreased, non-functional    Strength:  Strength: Generally decreased, functional    Coordination:  Coordination: Generally decreased, functional            Tone & Sensation:  Tone: Normal  Sensation: Impaired (reported BUE numbness/tingling)       Balance:  Sitting: Intact  Standing: Impaired  Standing - Static: Good  Standing - Dynamic : Fair    Functional Mobility and Transfers for ADLs:  Bed Mobility:  Rolling: Contact guard assistance;Assist x2  Supine to Sit: Contact guard assistance;Assist x2  Scooting: Stand-by assistance    Transfers:  Sit to Stand: Contact guard assistance;Assist x2  Stand to Sit: Contact guard assistance;Assist x2  Bed to Chair: Contact guard assistance;Assist x2    ADL Assessment:  Feeding: Minimum assistance    Oral Facial Hygiene/Grooming: Moderate assistance    Bathing: Total assistance    Upper Body Dressing: Total assistance    Lower Body Dressing: Total assistance    Toileting:  Total assistance              Therapeutic Exercise:     CARDIAC  EXERCISE   Sets   Reps   Active Active Assist   Passive Self ROM   Comments   Shoulder flexion 1 5 []                                            [x]                                            []                                            []                                               Shoulder abduction 1 5 []                                            [x]                                            []                                            []                                               Scapular elevation 1 5 [x]                                            [] []                                            []                                               Scapular retraction 1 5 [x]                                            []                                            []                                            []                                               Trunk rotation 1 5 [x]                                            []                                            []                                            []                                               Trunk sidebending 1 5 [x]                                            []                                            []                                            []                                                  []                                            []                                            []                                            []                                                   Functional Measure:  Barthel Index:    Bathin  Bladder: 0  Bowels: 5  Groomin  Dressin  Feedin  Mobility: 0  Stairs: 0  Toilet Use: 0  Transfer (Bed to Chair and Back): 5  Total: 10/100        Percentage of impairment   0%   1-19%   20-39%   40-59%   60-79%   80-99%   100%   Barthel Score 0-100 100 99-80 79-60 59-40 20-39 1-19   0     The Barthel ADL Index: Guidelines  1. The index should be used as a record of what a patient does, not as a record of what a patient could do. 2. The main aim is to establish degree of independence from any help, physical or verbal, however minor and for whatever reason. 3. The need for supervision renders the patient not independent. 4. A patient's performance should be established using the best available evidence. Asking the patient, friends/relatives and nurses are the usual sources, but direct observation and common sense are also important. However direct testing is not needed.   5. Usually the patient's performance over the preceding 24-48 hours is important, but occasionally longer periods will be relevant. 6. Middle categories imply that the patient supplies over 50 per cent of the effort. 7. Use of aids to be independent is allowed. Chani Hennessy., Barthel, D.W. ([id number]). Functional evaluation: the Barthel Index. [address] [facility] (14)2. Cleotha Reason pari GLENNY Hadley, Leland Cowden., Michelle Gaitan., [place], [address] (1999). Measuring the change indisability after inpatient rehabilitation; comparison of the responsiveness of the Barthel Index and Functional [place] Measure. Journal of Neurology, Neurosurgery, and Psychiatry, 66(4), 094-595. Gustavo Maya, N.J.A, DANIEL Hodge, & Viviane Horne M.A. (2004.) Assessment of post-stroke quality of life in cost-effectiveness studies: The usefulness of the Barthel Index and the EuroQoL-5D. Quality of Life Research, 13, 427-43        Pain Rating:  Sternal incision pain    Activity Tolerance:   Poor      After treatment patient left in no apparent distress:    Sitting in chair and Call bell within reach    COMMUNICATION/EDUCATION:   The patients plan of care was discussed with: Physical Therapist and Registered Nurse. Patient understands intent and goals of therapy, but is neutral about his/her participation. This patients plan of care is appropriate for delegation to [facility].     Thank you for this referral.  De Reza, OTR/L  Time Calculation: 31 mins

## 2022-11-23 NOTE — PROGRESS NOTES
Problem: Mobility Impaired (Adult and Pediatric)  Goal: *Acute Goals and Plan of Care (Insert Text)  Description: FUNCTIONAL STATUS PRIOR TO ADMISSION: Patient was independent and active without use of DME. Still driving. Denies home O2 use. States he is active at baseline, performing yard work and working part-time as a . HOME SUPPORT PRIOR TO ADMISSION: The patient lived with mother. Physical Therapy Goals  Initiated 11/23/2022  1. Patient will move from supine to sit and sit to supine , scoot up and down, and roll side to side in bed with modified independence within 5 days. 2.  Patient will perform sit to/from stand with modified independence within 5 days. 3.  Patient will ambulate 300 feet with least restrictive assistive device and independence within 5 days. 4.  Patient will ascend/descend 3 stairs with 1 handrail(s) with modified independence within 5 days. 5.  Patient will perform cardiac exercises per protocol with independence within 5 days. 6.  Patient will verbally and functionally demonstrate 3/3 sternal precautions without cues within 5 days.          11/23/2022 1257 by Gemini Dee PT  Outcome: Progressing Towards Goal   PHYSICAL THERAPY EVALUATION  Patient: Tiny Solares (61 y.o. male)  Date: 11/23/2022  Primary Diagnosis: Acute non-Q wave non-ST elevation myocardial infarction (NSTEMI) ([facility]) [I21.4]  Aortic stenosis [I35.0]  CAD (coronary artery disease) [I25.10]  Procedure(s) (LRB):  SYLWIA BY DR Karen Whitaker -  CORONARY ARTERY BYPASS GRAFT  X 1 WITH LEFT INTERNAL MAMMARY ARTERY GRAFTING - AORTIC VALVE REPLACEMENT WITH MEYER 25MM INSPIRIS RESILIA TISSUE VALVE AND REPAIR OF ASCENDING AORTIC ANEURYSM (N/A) 1 Day Post-Op   Precautions:   Sternal (move in the tube)      ASSESSMENT  Based on the objective data described below, the patient presents with drowsiness, self-limiting behaviors, increased pain levels, impaired activity tolerance, generalized weakness, impaired balance, BUE edema, and overall impaired functional mobility on POD 1 following CABGx1, AVR, and AAA repair. Pt required max verbal cueing throughout for arousal/maintained eyes in opened position/active participation during therapy. Overall, pt required CGAx2 for bed mobility, sit<>stand transfers, and brief ambulation from EOB>>bedside chair. Mobilization limited by lines/leads this date. Pending hospital course and further progress with therapy, recommend pt return home w/ HHPT and assist of family. Patient is not verbalizing and is not demonstrating understanding of mindful-based movements (\"move in the tube\") principles of keeping UEs proximal to ribcage to prevent lateral pull on the sternum during load-bearing activities with verbal and manual cues required for compliance. Current Level of Function Impacting Discharge (mobility/balance): CGAx2 during bed mobility, sit<>stand transfer, and brief ambulation from EOB>>bedside chair    Functional Outcome Measure: The patient scored 10/100 on the Barthel Index outcome measure which is indicative of significant impairments in ADLs and functional mobility. Other factors to consider for discharge: pain levels, self-limiting, independent prior     Patient will benefit from skilled therapy intervention to address the above noted impairments. PLAN :  Recommendations and Planned Interventions: bed mobility training, transfer training, gait training, therapeutic exercises, patient and family training/education, and therapeutic activities      Frequency/Duration: Patient will be followed by physical therapy:  daily to address goals.     Recommendation for discharge: (in order for the patient to meet his/her long term goals)  Physical therapy at least 2 days/week in the home  w/ family assist, pending further progress with therapy    This discharge recommendation:  Has not yet been discussed the attending provider and/or case management    IF patient discharges home will need the following DME: to be determined (TBD) likely none         SUBJECTIVE:   Patient stated I'm feeling nauseous.     OBJECTIVE DATA SUMMARY:   Patient mobilized on continuous portable monitor/telemetry.   HISTORY:    Past Medical History:   Diagnosis Date    Anxiety and depression     anxiety, depression    Bleeding of eye, left     8/17/21 pt reports receiving injections in right eye for beeding    Chronic headaches 2007    COVID-19 12/20/2020    Diabetes type 1, uncontrolled     since 8years old    GERD (gastroesophageal reflux disease)     Hypercholesterolemia     Hypertension     Hypothyroidism     MI (myocardial infarction) ([facility] [address]     as of 8/17/21 pt reports 9 stents total    WALLY on CPAP      Past Surgical History:   Procedure Laterality Date    HX CARPAL TUNNEL RELEASE Left 2012    HX CARPAL TUNNEL RELEASE Right 2018    CTE trigger thumb and index finger    HX HEART CATHETERIZATION      HX HEENT      HX LAP CHOLECYSTECTOMY  8/26/14    Dr Julien Mckee    HX WISDOM TEETH EXTRACTION         Personal factors and/or comorbidities impacting plan of care:     Home Situation  Home Environment: Private residence  # Steps to Enter: 3  Rails to Enter: Yes  One/Two Story Residence: One story  Living Alone: No  Support Systems: Parent(s)  Patient Expects to be Discharged to[de-identified] Home  Current DME Used/Available at Home: None  Tub or Shower Type: Tub/Shower combination    EXAMINATION/PRESENTATION/DECISION MAKING:     Critical Behavior:  Neurologic State: Pharmacologically induced (comment)  Orientation Level: Oriented to person, Oriented to place, Oriented to situation  Cognition: Decreased attention/concentration, Impaired decision making     Hearing:     Skin:  dressings clean, dry, intact  Edema: BUE edema including hands  Range Of Motion:  AROM: Grossly decreased, non-functional                       Strength:    Strength: Generally decreased, functional                    Tone & Sensation:   Tone: Normal              Sensation: Impaired (reported BUE numbness/tingling)               Coordination:  Coordination: Generally decreased, functional  Vision:   Acuity: Within Defined Limits  Functional Mobility:  Bed Mobility:  Rolling: Contact guard assistance;Assist x2  Supine to Sit: Contact guard assistance;Assist x2     Scooting: Stand-by assistance  Transfers:  Sit to Stand: Contact guard assistance;Assist x2  Stand to Sit: Contact guard assistance;Assist x2        Bed to Chair: Contact guard assistance;Assist x2              Balance:   Sitting: Intact  Standing: Impaired  Standing - Static: Good  Standing - Dynamic : Fair  Ambulation/Gait Training:  Distance (ft): 2 Feet (ft)  Assistive Device: Gait belt  Ambulation - Level of Assistance: Contact guard assistance;Assist x2        Gait Abnormalities: Decreased step clearance;Shuffling gait;Trunk sway increased        Base of Support: Widened     Speed/Juliet: Shuffled; Slow  Step Length: Left shortened;Right shortened                            Cardiac diagnosis intervention:  Patient instructed and educated on mindful movement principles based on Move in The Tube concept to include maintaining bilateral elbows close to rib cage when performing any load-bearing activity such as getting in/out of bed, pushing up from a chair, opening a door, or lifting a box. Patient was given a handout with diagrams of each correct/incorrect method of performing each of the above tasks. Therapeutic Exercises:   Patient instructed on the benefits and demonstrated cardiac exercises while seated in recliner chair with Moderate Assistance. Instructed and indicated understanding on how to progress reps, sets against gravity, pacing through progressive muscle strengthening standing based on surgeon clearance for more weight in prep for functional activity. Instruction on the use of household items in place of weights as needed.     CARDIAC   EXERCISE    Sets    Reps    Active Active Assist    Passive  Self ROM    Comments    Shoulder flexion  1  5   [x]                            []                             []                             []                                Shoulder abduction  1  5  [x]                             []                             []                             []                                Scapular elevation  1  5  [x]                             []                              []                             []                                Scapular retraction  1  5  [x]                             []                             []                             []                                Trunk rotation  1  5  [x]                             []                             []                             []                                Trunk sidebending  1  5  [x]                             []                              []                             []                                          Functional Measure:  Barthel Index:    Bathin  Bladder: 0  Bowels: 5  Groomin  Dressin  Feedin  Mobility: 0  Stairs: 0  Toilet Use: 0  Transfer (Bed to Chair and Back): 5  Total: 10/100       The Barthel ADL Index: Guidelines  1. The index should be used as a record of what a patient does, not as a record of what a patient could do. 2. The main aim is to establish degree of independence from any help, physical or verbal, however minor and for whatever reason. 3. The need for supervision renders the patient not independent. 4. A patient's performance should be established using the best available evidence. Asking the patient, friends/relatives and nurses are the usual sources, but direct observation and common sense are also important. However direct testing is not needed. 5. Usually the patient's performance over the preceding 24-48 hours is important, but occasionally longer periods will be relevant.   6. Middle categories imply that the patient supplies over 50 per cent of the effort. 7. Use of aids to be independent is allowed. Score Interpretation (from [facility])    Independent   60-79 Minimally independent   40-59 Partially dependent   20-39 Very dependent   <20 Totally dependent     -Wilian Rico., Barthel, D.W. (1965). Functional evaluation: the Barthel Index. [address] ([address] (1997). The Barthel activities of daily living index: self-reporting versus actual performance in the old (> or = 75 years). Journal of [address] [place] [address] 45(7), [address] [facility], GLENNY, Idalia Rodriguez., [place]. (1999). Measuring the change in disability after inpatient rehabilitation; comparison of the responsiveness of the Barthel Index and Functional [place] Measure. Journal of Neurology, Neurosurgery, and Psychiatry, 66(4), 358-088. Altagracia Mckeon, N.J.A, DANIEL Hodge, & Roberto Cruz MAshleyA. (2004) Assessment of post-stroke quality of life in cost-effectiveness studies: The usefulness of the Barthel Index and the EuroQoL-5D.  Quality of Life Research, 15, 567-03           Physical Therapy Evaluation Charge Determination   History Examination Presentation Decision-Making   MEDIUM  Complexity : 1-2 comorbidities / personal factors will impact the outcome/ POC  MEDIUM Complexity : 3 Standardized tests and measures addressing body structure, function, activity limitation and / or participation in recreation  MEDIUM Complexity : Evolving with changing characteristics  MEDIUM Complexity : FOTO score of 26-74      Based on the above components, the patient evaluation is determined to be of the following complexity level: MEDIUM    Pain Rating:  Reported increased sternal pain however did not quantify     Activity Tolerance:   Fair and Poor    After treatment patient left in no apparent distress:   Sitting in chair and Call bell within reach    COMMUNICATION/EDUCATION:   The patients plan of care was discussed with: Occupational therapist and Registered nurse. Fall prevention education was provided and the patient/caregiver indicated understanding., Patient/family have participated as able in goal setting and plan of care. , and Patient/family agree to work toward stated goals and plan of care.     Thank you for this referral.  Emma Dang, PT, DPT   Time Calculation: 27 mins

## 2022-11-23 NOTE — CARDIO/PULMONARY
Cardiac Rehab Note: chart review/referral     Consult has been acknowledged     NSTEMI    CABG/MVR 11/22/22    Smoking history assessed. Patient is a former smoker. Smoking Cessation Program link has not been added to the AVS.     EF  50-55   on 11/16/22 per echo    Surgical educational folder with \"Cardiac Surgery Post-Op Instructions\" book, heart healthy eating, warning signs, heart facts, heart aware, resources, and CABG Surgery booklet to [facility] on 11/23/22    Patient was sleeping soundly in recliner. CP Rehab will attempt to follow up.

## 2022-11-23 NOTE — PROGRESS NOTES
1945  restless and agitated in the bed. \" I don't feel tight. My heart is not right! \"  SR 85 and sats 98%. Attempting to remove bipap mask. PCA button pushed and increased precedex. PA line with much respiratory fling. CVP 5. LR bolus infusing. Mother at bedside    2100  Continued to rapidly increase precedex for anxiety to assist with bipap    2145  Dr Cory Molina called to check on patient. Toredol discontinued due to increased creatinine. Ok to take bipap off. Increase NS to 50 cc/ hr    2200  CI drift 1.9 and SBP dropping to 89. Increased Epi to 3mcg. 2320  Nephrocheck . 33  UOP < 60 cc/hr. CVP 7-8. Discuss result with NP.   cc given x 2      0355  Nephrocheck 1.44. NP notified and reviewed am lab results. PAP (flat) 32/17 and CVP 10.  CI 2.2. Order received for dose of Albumin and repeat labs at  5am.  NP also ordered Pro BNP  and stat CXR    0424  Ativan po given to start weaning precedex    0552  Lab results and most recent hemodynamics discussed with NP who called Dr Monae Elizabeth. Lasix 40 mg IVP ordered. 3312  Discuss with pharmacy rising creatinine and Vancomycin 1500 mg due (at 4am)  Instructed to hold this dose for now and get a random vancomycin level. Pharmacy will follow up with level and dosing.

## 2022-11-23 NOTE — OP NOTES
[address]  OPERATIVE REPORT    Name:  Gilberto Weber  MR#:  [id number]  :  1980  ACCOUNT #:  [de-identified]  DATE OF SERVICE:  2022    PREOPERATIVE DIAGNOSES:  1. Severe bicuspid aortic valve stenosis. 2.  Ascending aortic aneurysm. 3.  Severe coronary artery disease with in-stent restenosis. 4.  Obesity with a body mass index of 37, height 5 feet 9, weight 233 pounds. 5.  Status post Plavix washout. 6.  Diabetes mellitus with A1c of 10.  7.  Non-ST elevation myocardial infarction. POSTOPERATIVE DIAGNOSES:  1. Severe bicuspid aortic valve stenosis. 2.  Ascending aortic aneurysm. 3.  Severe coronary artery disease with in-stent restenosis. 4.  Obesity with a body mass index of 37, height 5 feet 9, weight 233 pounds. 5.  Status post Plavix washout. 6.  Diabetes mellitus with A1c of 10.  7.  Non-ST elevation myocardial infarction. PROCEDURES PERFORMED:  1. Aortic valve replacement with #25 bioprosthetic Rodas Inspiris valve. 2.  Ascending aortic replacement with #26 Dacron graft. 3.  CABG x1:  Left internal mammary artery to LAD. SURGEON:  Darrius Chinchilla MD    ASSISTANT:  Jaquelin Strauss PA-C. ANESTHESIA:  General endotracheal.    ANESTHESIOLOGIST:  Malia Mendoza MD    COMPLICATIONS:  None. SPECIMENS REMOVED:  None. IMPLANTS:  A #25 Rodas Inspiris valve, #26 Dacron tube graft. ESTIMATED BLOOD LOSS:  500 mL. DETAILS:  The patient is a 14-year-old gentleman who is a patient of Dr. Regina Stanford, who was found to have severe in-stent restenosis of his proximal LAD as well as severe symptomatic aortic stenosis with a bicuspid aortic valve. On CT scan, he was also found to have a 4 x 4.2 cm ascending aorta in the setting of bicuspid valve. He was prepped and draped in a sterile fashion. A time-out was performed. An incision was made over the sternum. Median sternotomy was performed. A left hemisternum was elevated.   Left internal mammary artery was dissected free from the left chest wall. It was large and pulsatile and ligated and divided. It was wrapped in a papaverine-soaked sponge. Simultaneously, the heparin was given for cardiopulmonary bypass. The sternal retractor was placed. The pericardium was opened widely and laterally. The ascending aorta was inspected with my finger. It was free of disease. It was quite large in appearance in the field and it was eccentric in its dilatation in the mid ascending aorta. The aortic arch however was normal in caliber as was the aortic root. We proceeded with aortic cannulation in the proximal aortic arch, right atrial cannulation as well as antegrade cardioplegic catheter placement, retrograde cardioplegic catheter placement and placement of a left ventricular clamp to the right superior pulmonary vein. When we had therapeutic ACT, we went on cardiopulmonary bypass. I then applied the aortic cross-clamp at the very distal ascending aorta up to the innominate artery. I then delivered antegrade as well as retrograde cardioplegia. We had a prompt diastolic arrest.  I then opened the ascending aorta. We then began with our LIMA to LAD anastomosis. The LAD was heavily diseased diffusely. It was calcified to the touch throughout its entire course. We did find a soft place in the very distal aspect of LAD. Fortunately, the mammary was able to reach to this extent. We opened the LAD and it was a large vessel. We performed the LIMA to LAD anastomosis with no issue. We then turned our attention to the aorta and the aortic valve. We opened the aorta transversely and we resected the aorta up to the aortic clamp. This comprised the entire ascending aorta. We then reached the stent in the aortic valve commissure. We inspected the aortic valve. It was a true bicuspid aortic valve. There was no commissure where a right commissure should have been.   We then proceeded to resect the aortic valve leaflets and debride the aortic annulus which was heavily calcified. We then placed our annular sutures circumferentially. We then sized the valve for a size 25 valve. We then placed the sutures through the aortic valve prosthesis and I seated this in place. We then secured this. We had no issues with this. We were happy with the setting of the valve. We then sized the proximal aorta and the distal aorta for approximately a 26 aortic graft. We performed a proximal anastomosis with no issue. We then performed our distal aortic anastomosis. We had no issues with this either. We then placed an antegrade cardioplegia catheter for deairing in the aortic graft itself. We then released the aortic cross clamp. We deaired. We weaned from cardiopulmonary bypass using a low dose of epinephrine. We then dried up our surgical sites. We administered protamine. We decannulated. We packed the aortic anastomosis. We had no issues with bleeding. No blood products were required. We were satisfied with hemostasis and the biventricular function as well as the function of the aortic valve which had a mean gradient of 9. We were satisfied that we had a size 25 valve and it appeared to be functioning properly. We then placed two mediastinal chest tubes and a left pleural chest tube. We placed atrial and ventricular pacing wires. We then placed numerous stainless steel wires and closed the soft tissue in multiple layers as well. The patient was then safely transported to the CCU. The PA was critical for all aspects of the surgery including the sternotomy, the cannulation, construction of the anastomosis, placement of the aortic valve, construction of the medial ascending aorta, decannulation, hemostasis, closure of the wound and transport.       MD DANIELA Hale/[email or web address]  D:  11/22/2022 16:27  T:  11/22/2022 23:01  JOB #:  [id number]

## 2022-11-23 NOTE — CONSULTS
Consultation Note    NAME: Pino Valenzuela   :  1980   MRN:  [id number]     Date/Time:  2022 11:10 AM         Assessment :    Plan:  SERGE  hyperkalemia  DM  PVD  CAD  S/P CABGX1  S/P AVR  Hypotension   SERGE  likely due to ATN from hypotension. Currently on low dose pressor. Check UA. He has a pennington. Hold on renal imaging. Got lasix last night. Fair to poor UO. He appears to be volume overloaded. There is no acute need for dialysis. Potassium elevated. I will treat acutely and also give lokelma. Repeat potassium this afternoon. Subjective:   CHIEF COMPLAINT:  \"My chest hurts. \"    HISTORY OF PRESENT ILLNESS:     Pati Lee is a 36 y.o.   male who has a history of DM, HTN S/P AVR and CABG X 1.  Creatinine pre-op was 0.97. Post-op it was up to 1.62 and this AM was 2.01. He has a pennington. RN reports fair to poor UO. He was given lasix overnight with a poor response.       Past Medical History:   Diagnosis Date    Anxiety and depression     anxiety, depression    Bleeding of eye, left     21 pt reports receiving injections in right eye for beeding    Chronic headaches     COVID-19 2020    Diabetes type 1, uncontrolled     since 8years old    GERD (gastroesophageal reflux disease)     Hypercholesterolemia     Hypertension     Hypothyroidism     MI (myocardial infarction) ([facility] [address]     as of 21 pt reports 9 stents total    WALLY on CPAP       Past Surgical History:   Procedure Laterality Date    HX CARPAL TUNNEL RELEASE Left     HX CARPAL TUNNEL RELEASE Right     CTE trigger thumb and index finger    HX HEART CATHETERIZATION      HX HEENT      HX LAP CHOLECYSTECTOMY  13    Dr [facility]    HX WISDOM TEETH EXTRACTION       Social History     Tobacco Use    Smoking status: Former     Packs/day: 0.00     Years: 14.00     Pack years: 0.00     Types: Cigarettes     Quit date: 2014     Years since quittin.8    Smokeless tobacco: Never   Substance Use Topics Alcohol use: No      Family History   Problem Relation Age of Onset    Diabetes Mother     Hypertension Mother     Heart Disease Father     Cancer Father     Diabetes Father     Other Father         MAC    Diabetes Paternal Aunt     Diabetes Maternal Grandmother     Thyroid Cancer Maternal Grandmother     Kidney Disease Maternal Grandmother     Arthritis Maternal Grandmother     Heart Disease Maternal Grandfather     High Cholesterol Maternal Grandfather     Hypertension Maternal Grandfather     Diabetes Paternal Grandmother     Hemophilia Paternal Grandfather       Allergies   Allergen Reactions    Gabapentin Swelling     Of feet    Morphine Nausea and Vomiting    Penicillin G Swelling    Percocet [Oxycodone-Acetaminophen] Hives and Nausea and Vomiting     Pt is allergic to Oxycodone, has previously tolerated Tylenol and Hydrocodone. Sulfa (Sulfonamide Antibiotics) Swelling    Tramadol Nausea Only     Nausea and headache        Prior to Admission medications    Medication Sig Start Date End Date Taking? Authorizing Provider   metFORMIN (GLUCOPHAGE) 500 mg tablet TAKE 1 TABLET BY MOUTH TWICE DAILY WITH MEALS 11/7/22  Yes Kalyan Campbell MD   insulin glargine U-300 conc (Toujeo Max U-300 SoloStar) 300 unit/mL (3 mL) inpn Take 140 units every day (new dosage) 10/31/22  Yes Kalyan Campbell MD   LORazepam (ATIVAN) 1 mg tablet Take 1 Tablet by mouth every eight (8) hours as needed for Anxiety. Max Daily Amount: 3 mg. 10/13/22  Yes Adele Fernandez NP   sertraline (ZOLOFT) 100 mg tablet Take 1 Tablet by mouth daily. 10/13/22  Yes Adele Fernandez NP   sertraline (ZOLOFT) 50 mg tablet Take 1 Tablet by mouth daily. 10/13/22  Yes Ai Fernandez NP   alum-mag hydroxide-simeth (MYLANTA) 200-200-20 mg/5 mL susp Take 30 mL by mouth every four (4) hours as needed for Indigestion. 9/14/22  Yes Jayshree Candelario MD   levothyroxine (SYNTHROID) 125 mcg tablet Take 1 Tablet by mouth Daily (before breakfast). 9/13/22  Yes Kalen Ryan MD   insulin regular (NOVOLIN R, HUMULIN R) 100 unit/mL injection 40 units with each meal, plus correction. Max daily dose of 150 units. 8/18/22  Yes Kalen Ryan MD   busPIRone (BUSPAR) 15 mg tablet Take 1 Tablet by mouth three (3) times daily. 8/12/22  Yes Sravanthi Torres MD   Insulin Needles, Disposable, 32 gauge x 5/32\" ndle 5 shots per day (dispense whatever brand is covered by his insurance) 8/2/22  Yes Kalen Ryan MD   lisinopriL (PRINIVIL, ZESTRIL) 10 mg tablet Take 10 mg by mouth daily. 6/24/22  Yes Provider, Historical   testosterone (ANDROGEL) 20.25 mg/1.25 gram (1.62 %) gel Apply 4 pump presses daily. Ok to dispense generic testosterone. 6/29/22  Yes Kalen Ryan MD   butalbital-acetaminophen-caffeine (FIORICET, ESGIC) -40 mg per tablet Take 1 Tablet by mouth every six (6) hours as needed for Headache. Indications: a migraine headache 6/13/22  Yes Stevo Lowe, NP   esomeprazole (NEXIUM) 40 mg capsule TAKE 1 CAPSULE BY MOUTH ONCE DAILY BEFORE BREAKFAST DO NOT OPEN CAPSULE 4/27/22  Yes Provider, Historical   metoprolol succinate (TOPROL-XL) 25 mg XL tablet Take 25 mg by mouth two (2) times a day. Yes Provider, Historical   rosuvastatin (CRESTOR) 40 mg tablet Take 40 mg by mouth daily. 2/16/22  Yes Provider, Historical   famotidine (PEPCID) 40 mg tablet Take 1 Tablet by mouth daily. 1/19/22  Yes Jackson Sunshine MD   clopidogreL (PLAVIX) 75 mg tab Take 1 Tablet by mouth daily. 10/29/21  Yes Sal Wilkins MD   cholecalciferol (Vitamin D3) (5000 Units/125 mcg) tab tablet Take 1 Tab by mouth daily. 3/10/21  Yes Jackson Sunshine MD   Insulin Syringe-Needle U-100 (BD Insulin Syringe) 1 mL 25 x 1\" syrg Use for drawing up/injecting testosterone once weekly. 12/7/20  Yes Kalen Ryan MD   nitroglycerin (NITROSTAT) 0.4 mg SL tablet Take 1 Tab by mouth every five (5) minutes as needed for Chest Pain.  Sit down then put one tab under the tongue every 5 minutes as needed 12/4/19  Yes Boris Adams MD   aspirin delayed-release 81 mg tablet Take 1 Tab by mouth daily. 9/24/19  Yes Regine Verduzco MD   insulin U-500 syringe-needle (BD Insulin Syringe U-500) 1/2 mL 31 gauge x 15/64\" syrg Three shots per day.  6/29/22   Kalen Ryan MD     REVIEW OF SYSTEMS:     []  Unable to obtain reliable ROS due to  [] mental status  [] sedated   [] intubated   [x] Total of 12 systems reviewed as follows:  Constitutional: negative fever, negative chills, negative weight loss  Eyes:   negative visual changes  ENT:   negative sore throat, tongue or lip swelling  Respiratory:  negative cough, negative dyspnea  Cards:  pos for chest pain, no palpitations, pos lower extremity edema  GI:   negative for nausea, vomiting, diarrhea, and abdominal pain  :  negative for frequency, dysuria  Integument:  negative for rash and pruritus  Heme:  negative for easy bruising and gum/nose bleeding  Musculoskel: negative for myalgias,  back pain and muscle weakness  Neuro:  negative for headaches, dizziness, vertigo  Psych:  negative for feelings of anxiety, depression   Travel?: none    Objective:   VITALS:    Visit Vitals  BP (!) 105/56   Pulse 83   Temp (!) 100.9 °F (38.3 °C)   Resp 27   Ht 5' 9\" (1.753 m)   Wt 124.5 kg (274 lb 7.6 oz)   SpO2 96%   BMI 40.53 kg/m²     PHYSICAL EXAM:  Gen:  []  WD []  WN  [] cachectic []  thin []  obese []  disheveled             [x]  ill apearing  []   Critical  []   Chronic    []  No acute distress    HEENT:   [x] NC/AT/PERRL    [x] pink conjunctivae      [] pale conjunctivae                  PERRL  [] yes  [] no      [] moist mucosa    [] dry mucosa    hearing intact to voice [] yes  [] No                 NECK:   supple [x] yes  [] no        masses [] yes  [x] No               thyroid  []  non tender  []  tender    RESP:   [] CTA bilaterally/no wheezing/rhonchi/rales/crackles    [x] rhonchi bilaterally - no dullness  [] wheezing   [] rhonchi [] crackles     use of accessory muscles [] yes [] no    CARD:   [x]  regular rate and rhythm/No murmurs/rubs/gallops    murmur  [] yes ()  [] no      Rubs  [] yes  [] no       Gallops [] yes  [] no    Rate []  regular  []  irregular        carotid bruits  [] Right  []  Left                 LE edema [x] yes  [] no           JVP  []  yes   []  no    ABD:    [x] soft/non distended/non tender/+bowel sounds/no HSM    []  Rigid    tenderness [] yes [] no   Liver enlargement  []  yes []  no                Spleen enlargement  []  yes []  no     distended []  yes [] no     bowel sound  [] hypoactive   [] hyperactive    LYMPH:    Neck []  yes [x]  no       Axillae []  yes [x]  no    SKIN:   Rashes []  yes   [x]  no    Ulcers []  yes   [x]  no               [] tight to palpitation    skin turgor []  good  [] poor  [] decreased               Cyanosis/clubbing []  yes []  no    NEUR:   [x] cranial nerves II-XII grossly intact       [] Cranial nerves deficit                 []  facial droop    []  slurred speech   [] aphasic     [] Strength normal     []  weakness  []  LUE  []   RUE/ []  LLE  []   RLE    follows commands  [x]  yes []  no           PSYCH:   insight [x] poor [] good   Alert and Oriented to  [x] person  [x] place  []  time                    [] depressed [] anxious [] agitated  [] lethargic [] stuporous  [] sedated     LAB DATA REVIEWED:    Recent Labs     11/23/22  0249 11/22/22 1849   WBC 20.6*  --    HGB 10.8* 11.7*   HCT 32.8* 34.4*     --      Recent Labs     11/23/22  0456 11/23/22  0249 11/22/22  1842 11/22/22  1513    140 141  --    K 5.6* 5.6* 4.8  --    * 110* 110*  --    CO2 23 23 23  --    BUN 27* 26* 22*  --    CREA 2.01* 1.94* 1.62*  --    * 132* 176*  --    CA 8.7 9.0 9.1  --    MG  --  2.4 2.7* 3.0*     Recent Labs     11/23/22  0456 11/23/22  0249 11/22/22  1842   * 107* 109*   AP 72 73 78   TBILI 0.7 0.8 1.9*   ALB 3.4* 3.3* 3.6   GLOB 2.6 2.5 2.4     Recent Labs 11/22/22  1513   INR 1.1   PTP 11.2*   APTT 26.4      No results for input(s): FE, TIBC, PSAT, FERR in the last 72 hours. Recent Labs     11/22/22  1841 11/22/22  1503   PH 7.31* 7.26*   PCO2 43 51*   PO2 84 87     No results for input(s): CPK, CKMB in the last 72 hours.     No lab exists for component: TROPONINI  Lab Results   Component Value Date/Time    Glucose (POC) 116 11/23/2022 09:59 AM    Glucose (POC) 94 11/23/2022 08:57 AM    Glucose (POC) 110 11/23/2022 05:03 AM    Glucose (POC) 115 11/23/2022 04:53 AM    Glucose (POC) 112 11/23/2022 03:55 AM       Procedures: see electronic medical records for all procedures/Xrays and details which were not copied into this note but were reviewed prior to creation of Plan.    ________________________________________________________________________       ___________________________________________________  Consulting Physician: Lou Michelle MD

## 2022-11-24 ENCOUNTER — APPOINTMENT (OUTPATIENT)
Dept: ULTRASOUND IMAGING | Age: 42
End: 2022-11-24
Attending: PHYSICIAN ASSISTANT
Payer: COMMERCIAL

## 2022-11-24 ENCOUNTER — APPOINTMENT (OUTPATIENT)
Dept: NON INVASIVE DIAGNOSTICS | Age: 42
End: 2022-11-24
Attending: NURSE PRACTITIONER
Payer: COMMERCIAL

## 2022-11-24 ENCOUNTER — ANESTHESIA (OUTPATIENT)
Dept: CCU | Age: 42
End: 2022-11-24
Payer: COMMERCIAL

## 2022-11-24 ENCOUNTER — APPOINTMENT (OUTPATIENT)
Dept: GENERAL RADIOLOGY | Age: 42
End: 2022-11-24
Attending: NURSE PRACTITIONER
Payer: COMMERCIAL

## 2022-11-24 ENCOUNTER — ANESTHESIA EVENT (OUTPATIENT)
Dept: CCU | Age: 42
End: 2022-11-24
Payer: COMMERCIAL

## 2022-11-24 ENCOUNTER — APPOINTMENT (OUTPATIENT)
Dept: GENERAL RADIOLOGY | Age: 42
End: 2022-11-24
Attending: THORACIC SURGERY (CARDIOTHORACIC VASCULAR SURGERY)
Payer: COMMERCIAL

## 2022-11-24 LAB
ADMINISTERED INITIALS, ADMINIT: NORMAL
ALBUMIN SERPL-MCNC: 2.9 G/DL (ref 3.5–5)
ALBUMIN SERPL-MCNC: 3 G/DL (ref 3.5–5)
ALBUMIN/GLOB SERPL: 1 {RATIO} (ref 1.1–2.2)
ALBUMIN/GLOB SERPL: 1.1 {RATIO} (ref 1.1–2.2)
ALP SERPL-CCNC: 110 U/L (ref 45–117)
ALP SERPL-CCNC: 111 U/L (ref 45–117)
ALT SERPL-CCNC: 151 U/L (ref 12–78)
ALT SERPL-CCNC: 155 U/L (ref 12–78)
ANION GAP SERPL CALC-SCNC: 4 MMOL/L (ref 5–15)
ANION GAP SERPL CALC-SCNC: 5 MMOL/L (ref 5–15)
ANION GAP SERPL CALC-SCNC: 5 MMOL/L (ref 5–15)
APTT PPP: 26 SEC (ref 22.1–31)
ARTERIAL PATENCY WRIST A: ABNORMAL
AST SERPL-CCNC: 331 U/L (ref 15–37)
AST SERPL-CCNC: 340 U/L (ref 15–37)
B PERT DNA SPEC QL NAA+PROBE: NOT DETECTED
BASE DEFICIT BLDA-SCNC: 0.2 MMOL/L
BASE DEFICIT BLDA-SCNC: 1.6 MMOL/L
BASE EXCESS BLDA CALC-SCNC: 0.3 MMOL/L
BASOPHILS # BLD: 0 K/UL (ref 0–0.1)
BASOPHILS # BLD: 0.2 K/UL (ref 0–0.1)
BASOPHILS NFR BLD: 0 % (ref 0–1)
BASOPHILS NFR BLD: 1 % (ref 0–1)
BDY SITE: ABNORMAL
BILIRUB SERPL-MCNC: 1.6 MG/DL (ref 0.2–1)
BILIRUB SERPL-MCNC: 2 MG/DL (ref 0.2–1)
BNP SERPL-MCNC: 2833 PG/ML
BORDETELLA PARAPERTUSSIS PCR, BORPAR: NOT DETECTED
BUN SERPL-MCNC: 34 MG/DL (ref 6–20)
BUN/CREAT SERPL: 19 (ref 12–20)
BUN/CREAT SERPL: 19 (ref 12–20)
BUN/CREAT SERPL: 21 (ref 12–20)
C PNEUM DNA SPEC QL NAA+PROBE: NOT DETECTED
CALCIUM SERPL-MCNC: 8.4 MG/DL (ref 8.5–10.1)
CALCIUM SERPL-MCNC: 8.5 MG/DL (ref 8.5–10.1)
CALCIUM SERPL-MCNC: 8.6 MG/DL (ref 8.5–10.1)
CHLORIDE SERPL-SCNC: 102 MMOL/L (ref 97–108)
CHLORIDE SERPL-SCNC: 102 MMOL/L (ref 97–108)
CHLORIDE SERPL-SCNC: 103 MMOL/L (ref 97–108)
CO2 SERPL-SCNC: 26 MMOL/L (ref 21–32)
CO2 SERPL-SCNC: 27 MMOL/L (ref 21–32)
CO2 SERPL-SCNC: 28 MMOL/L (ref 21–32)
COVID-19 RAPID TEST, COVR: DETECTED
CREAT SERPL-MCNC: 1.6 MG/DL (ref 0.7–1.3)
CREAT SERPL-MCNC: 1.79 MG/DL (ref 0.7–1.3)
CREAT SERPL-MCNC: 1.82 MG/DL (ref 0.7–1.3)
D DIMER PPP FEU-MCNC: 0.32 MG/L FEU (ref 0–0.65)
D50 ADMINISTERED, D50ADM: 0 ML
D50 ORDER, D50ORD: 0 ML
DIFFERENTIAL METHOD BLD: ABNORMAL
DIFFERENTIAL METHOD BLD: ABNORMAL
EOSINOPHIL # BLD: 0 K/UL (ref 0–0.4)
EOSINOPHIL # BLD: 0 K/UL (ref 0–0.4)
EOSINOPHIL NFR BLD: 0 % (ref 0–7)
EOSINOPHIL NFR BLD: 0 % (ref 0–7)
ERYTHROCYTE [DISTWIDTH] IN BLOOD BY AUTOMATED COUNT: 15.1 % (ref 11.5–14.5)
ERYTHROCYTE [DISTWIDTH] IN BLOOD BY AUTOMATED COUNT: 15.3 % (ref 11.5–14.5)
FIO2 ON VENT: 100 %
FIO2 ON VENT: 100 %
FLUAV H1 2009 PAND RNA SPEC QL NAA+PROBE: NOT DETECTED
FLUAV H1 RNA SPEC QL NAA+PROBE: NOT DETECTED
FLUAV H3 RNA SPEC QL NAA+PROBE: NOT DETECTED
FLUAV SUBTYP SPEC NAA+PROBE: NOT DETECTED
FLUBV RNA SPEC QL NAA+PROBE: NOT DETECTED
GAS FLOW.O2 SETTING OXYMISER: 16 L/MIN
GAS FLOW.O2 SETTING OXYMISER: 16 L/MIN
GLOBULIN SER CALC-MCNC: 2.8 G/DL (ref 2–4)
GLOBULIN SER CALC-MCNC: 2.9 G/DL (ref 2–4)
GLUCOSE BLD STRIP.AUTO-MCNC: 100 MG/DL (ref 65–117)
GLUCOSE BLD STRIP.AUTO-MCNC: 103 MG/DL (ref 65–117)
GLUCOSE BLD STRIP.AUTO-MCNC: 103 MG/DL (ref 65–117)
GLUCOSE BLD STRIP.AUTO-MCNC: 108 MG/DL (ref 65–117)
GLUCOSE BLD STRIP.AUTO-MCNC: 108 MG/DL (ref 65–117)
GLUCOSE BLD STRIP.AUTO-MCNC: 111 MG/DL (ref 65–117)
GLUCOSE BLD STRIP.AUTO-MCNC: 112 MG/DL (ref 65–117)
GLUCOSE BLD STRIP.AUTO-MCNC: 114 MG/DL (ref 65–117)
GLUCOSE BLD STRIP.AUTO-MCNC: 122 MG/DL (ref 65–117)
GLUCOSE BLD STRIP.AUTO-MCNC: 122 MG/DL (ref 65–117)
GLUCOSE BLD STRIP.AUTO-MCNC: 127 MG/DL (ref 65–117)
GLUCOSE BLD STRIP.AUTO-MCNC: 128 MG/DL (ref 65–117)
GLUCOSE BLD STRIP.AUTO-MCNC: 138 MG/DL (ref 65–117)
GLUCOSE BLD STRIP.AUTO-MCNC: 80 MG/DL (ref 65–117)
GLUCOSE BLD STRIP.AUTO-MCNC: 86 MG/DL (ref 65–117)
GLUCOSE BLD STRIP.AUTO-MCNC: 94 MG/DL (ref 65–117)
GLUCOSE BLD STRIP.AUTO-MCNC: 96 MG/DL (ref 65–117)
GLUCOSE BLD STRIP.AUTO-MCNC: 97 MG/DL (ref 65–117)
GLUCOSE SERPL-MCNC: 122 MG/DL (ref 65–100)
GLUCOSE SERPL-MCNC: 124 MG/DL (ref 65–100)
GLUCOSE SERPL-MCNC: 144 MG/DL (ref 65–100)
GLUCOSE, GLC: 100 MG/DL
GLUCOSE, GLC: 103 MG/DL
GLUCOSE, GLC: 103 MG/DL
GLUCOSE, GLC: 108 MG/DL
GLUCOSE, GLC: 108 MG/DL
GLUCOSE, GLC: 111 MG/DL
GLUCOSE, GLC: 112 MG/DL
GLUCOSE, GLC: 114 MG/DL
GLUCOSE, GLC: 122 MG/DL
GLUCOSE, GLC: 122 MG/DL
GLUCOSE, GLC: 128 MG/DL
GLUCOSE, GLC: 138 MG/DL
GLUCOSE, GLC: 80 MG/DL
GLUCOSE, GLC: 86 MG/DL
GLUCOSE, GLC: 94 MG/DL
GLUCOSE, GLC: 96 MG/DL
GLUCOSE, GLC: 97 MG/DL
HADV DNA SPEC QL NAA+PROBE: NOT DETECTED
HCO3 BLDA-SCNC: 25 MMOL/L (ref 22–26)
HCO3 BLDA-SCNC: 25 MMOL/L (ref 22–26)
HCO3 BLDA-SCNC: 26 MMOL/L (ref 22–26)
HCOV 229E RNA SPEC QL NAA+PROBE: NOT DETECTED
HCOV HKU1 RNA SPEC QL NAA+PROBE: NOT DETECTED
HCOV NL63 RNA SPEC QL NAA+PROBE: NOT DETECTED
HCOV OC43 RNA SPEC QL NAA+PROBE: NOT DETECTED
HCT VFR BLD AUTO: 28.7 % (ref 36.6–50.3)
HCT VFR BLD AUTO: 29.2 % (ref 36.6–50.3)
HGB BLD-MCNC: 9.1 G/DL (ref 12.1–17)
HGB BLD-MCNC: 9.2 G/DL (ref 12.1–17)
HIGH TARGET, HITG: 130 MG/DL
HMPV RNA SPEC QL NAA+PROBE: NOT DETECTED
HPIV1 RNA SPEC QL NAA+PROBE: NOT DETECTED
HPIV2 RNA SPEC QL NAA+PROBE: NOT DETECTED
HPIV3 RNA SPEC QL NAA+PROBE: NOT DETECTED
HPIV4 RNA SPEC QL NAA+PROBE: NOT DETECTED
IMM GRANULOCYTES # BLD AUTO: 0 K/UL (ref 0–0.04)
IMM GRANULOCYTES # BLD AUTO: 0 K/UL (ref 0–0.04)
IMM GRANULOCYTES NFR BLD AUTO: 0 % (ref 0–0.5)
IMM GRANULOCYTES NFR BLD AUTO: 0 % (ref 0–0.5)
INSULIN ADMINSTERED, INSADM: 10.1 UNITS/HOUR
INSULIN ADMINSTERED, INSADM: 10.5 UNITS/HOUR
INSULIN ADMINSTERED, INSADM: 10.5 UNITS/HOUR
INSULIN ADMINSTERED, INSADM: 12 UNITS/HOUR
INSULIN ADMINSTERED, INSADM: 2.5 UNITS/HOUR
INSULIN ADMINSTERED, INSADM: 3.6 UNITS/HOUR
INSULIN ADMINSTERED, INSADM: 4 UNITS/HOUR
INSULIN ADMINSTERED, INSADM: 5 UNITS/HOUR
INSULIN ADMINSTERED, INSADM: 5.1 UNITS/HOUR
INSULIN ADMINSTERED, INSADM: 5.6 UNITS/HOUR
INSULIN ADMINSTERED, INSADM: 5.8 UNITS/HOUR
INSULIN ADMINSTERED, INSADM: 5.8 UNITS/HOUR
INSULIN ADMINSTERED, INSADM: 7.8 UNITS/HOUR
INSULIN ADMINSTERED, INSADM: 8.3 UNITS/HOUR
INSULIN ADMINSTERED, INSADM: 9.1 UNITS/HOUR
INSULIN ADMINSTERED, INSADM: 9.3 UNITS/HOUR
INSULIN ADMINSTERED, INSADM: 9.9 UNITS/HOUR
INSULIN ORDER, INSORD: 10.1 UNITS/HOUR
INSULIN ORDER, INSORD: 10.5 UNITS/HOUR
INSULIN ORDER, INSORD: 10.5 UNITS/HOUR
INSULIN ORDER, INSORD: 12 UNITS/HOUR
INSULIN ORDER, INSORD: 2.5 UNITS/HOUR
INSULIN ORDER, INSORD: 3.6 UNITS/HOUR
INSULIN ORDER, INSORD: 4 UNITS/HOUR
INSULIN ORDER, INSORD: 5 UNITS/HOUR
INSULIN ORDER, INSORD: 5.1 UNITS/HOUR
INSULIN ORDER, INSORD: 5.6 UNITS/HOUR
INSULIN ORDER, INSORD: 5.8 UNITS/HOUR
INSULIN ORDER, INSORD: 5.8 UNITS/HOUR
INSULIN ORDER, INSORD: 7.8 UNITS/HOUR
INSULIN ORDER, INSORD: 8.3 UNITS/HOUR
INSULIN ORDER, INSORD: 9.1 UNITS/HOUR
INSULIN ORDER, INSORD: 9.3 UNITS/HOUR
INSULIN ORDER, INSORD: 9.9 UNITS/HOUR
LACTATE SERPL-SCNC: 1.4 MMOL/L (ref 0.4–2)
LOW TARGET, LOT: 95 MG/DL
LYMPHOCYTES # BLD: 1.9 K/UL (ref 0.8–3.5)
LYMPHOCYTES # BLD: 3 K/UL (ref 0.8–3.5)
LYMPHOCYTES NFR BLD: 10 % (ref 12–49)
LYMPHOCYTES NFR BLD: 18 % (ref 12–49)
M PNEUMO DNA SPEC QL NAA+PROBE: NOT DETECTED
MAGNESIUM SERPL-MCNC: 2.2 MG/DL (ref 1.6–2.4)
MCH RBC QN AUTO: 27.2 PG (ref 26–34)
MCH RBC QN AUTO: 27.2 PG (ref 26–34)
MCHC RBC AUTO-ENTMCNC: 31.5 G/DL (ref 30–36.5)
MCHC RBC AUTO-ENTMCNC: 31.7 G/DL (ref 30–36.5)
MCV RBC AUTO: 85.9 FL (ref 80–99)
MCV RBC AUTO: 86.4 FL (ref 80–99)
METAMYELOCYTES NFR BLD MANUAL: 1 %
MINUTES UNTIL NEXT BG, NBG: 120 MIN
MINUTES UNTIL NEXT BG, NBG: 120 MIN
MINUTES UNTIL NEXT BG, NBG: 60 MIN
MONOCYTES # BLD: 1.6 K/UL (ref 0–1)
MONOCYTES # BLD: 3 K/UL (ref 0–1)
MONOCYTES NFR BLD: 10 % (ref 5–13)
MONOCYTES NFR BLD: 16 % (ref 5–13)
MULTIPLIER, MUL: 0.11
MULTIPLIER, MUL: 0.11
MULTIPLIER, MUL: 0.12
MULTIPLIER, MUL: 0.13
MULTIPLIER, MUL: 0.15
MULTIPLIER, MUL: 0.15
MULTIPLIER, MUL: 0.19
NEUTS BAND NFR BLD MANUAL: 1 %
NEUTS BAND NFR BLD MANUAL: 1 %
NEUTS SEG # BLD: 11.5 K/UL (ref 1.8–8)
NEUTS SEG # BLD: 13.7 K/UL (ref 1.8–8)
NEUTS SEG NFR BLD: 69 % (ref 32–75)
NEUTS SEG NFR BLD: 73 % (ref 32–75)
NRBC # BLD: 0.02 K/UL (ref 0–0.01)
NRBC # BLD: 0.03 K/UL (ref 0–0.01)
NRBC BLD-RTO: 0.1 PER 100 WBC
NRBC BLD-RTO: 0.2 PER 100 WBC
ORDER INITIALS, ORDINIT: NORMAL
PCO2 BLDA: 43 MMHG (ref 35–45)
PCO2 BLDA: 44 MMHG (ref 35–45)
PCO2 BLDA: 50 MMHG (ref 35–45)
PEEP RESPIRATORY: 12 CM[H2O]
PEEP RESPIRATORY: 12 CM[H2O]
PH BLDA: 7.32 [PH] (ref 7.35–7.45)
PH BLDA: 7.39 [PH] (ref 7.35–7.45)
PH BLDA: 7.39 [PH] (ref 7.35–7.45)
PLATELET # BLD AUTO: 107 K/UL (ref 150–400)
PLATELET # BLD AUTO: 113 K/UL (ref 150–400)
PMV BLD AUTO: 10.1 FL (ref 8.9–12.9)
PMV BLD AUTO: 10.2 FL (ref 8.9–12.9)
PO2 BLDA: 199 MMHG (ref 80–100)
PO2 BLDA: 51 MMHG (ref 80–100)
PO2 BLDA: 66 MMHG (ref 80–100)
POTASSIUM SERPL-SCNC: 5 MMOL/L (ref 3.5–5.1)
POTASSIUM SERPL-SCNC: 5 MMOL/L (ref 3.5–5.1)
POTASSIUM SERPL-SCNC: 5.1 MMOL/L (ref 3.5–5.1)
PROT SERPL-MCNC: 5.8 G/DL (ref 6.4–8.2)
PROT SERPL-MCNC: 5.8 G/DL (ref 6.4–8.2)
RBC # BLD AUTO: 3.34 M/UL (ref 4.1–5.7)
RBC # BLD AUTO: 3.38 M/UL (ref 4.1–5.7)
RBC MORPH BLD: ABNORMAL
RBC MORPH BLD: ABNORMAL
RSV RNA SPEC QL NAA+PROBE: NOT DETECTED
RV+EV RNA SPEC QL NAA+PROBE: NOT DETECTED
SAO2 % BLD: 86 % (ref 92–97)
SAO2 % BLD: 91 % (ref 92–97)
SAO2 % BLD: 99 % (ref 92–97)
SAO2% DEVICE SAO2% SENSOR NAME: ABNORMAL
SARS-COV-2 PCR, COVPCR: NOT DETECTED
SERVICE CMNT-IMP: ABNORMAL
SERVICE CMNT-IMP: NORMAL
SODIUM SERPL-SCNC: 133 MMOL/L (ref 136–145)
SODIUM SERPL-SCNC: 134 MMOL/L (ref 136–145)
SODIUM SERPL-SCNC: 135 MMOL/L (ref 136–145)
SOURCE, COVRS: ABNORMAL
SPECIMEN SITE: ABNORMAL
THERAPEUTIC RANGE,PTTT: NORMAL SECS (ref 58–77)
TROPONIN-HIGH SENSITIVITY: 6485 NG/L (ref 0–76)
UFH PPP CHRO-ACNC: 0.28 IU/ML
UFH PPP CHRO-ACNC: <0.1 IU/ML
VENTILATION MODE VENT: ABNORMAL
VENTILATION MODE VENT: ABNORMAL
VT SETTING VENT: 450 ML
WBC # BLD AUTO: 16.4 K/UL (ref 4.1–11.1)
WBC # BLD AUTO: 18.6 K/UL (ref 4.1–11.1)

## 2022-11-24 PROCEDURE — 74011250637 HC RX REV CODE- 250/637: Performed by: PHYSICIAN ASSISTANT

## 2022-11-24 PROCEDURE — 74011250636 HC RX REV CODE- 250/636: Performed by: NURSE PRACTITIONER

## 2022-11-24 PROCEDURE — 36415 COLL VENOUS BLD VENIPUNCTURE: CPT

## 2022-11-24 PROCEDURE — 71045 X-RAY EXAM CHEST 1 VIEW: CPT

## 2022-11-24 PROCEDURE — 74011000258 HC RX REV CODE- 258: Performed by: NURSE PRACTITIONER

## 2022-11-24 PROCEDURE — 74011250637 HC RX REV CODE- 250/637: Performed by: THORACIC SURGERY (CARDIOTHORACIC VASCULAR SURGERY)

## 2022-11-24 PROCEDURE — 83605 ASSAY OF LACTIC ACID: CPT

## 2022-11-24 PROCEDURE — 85025 COMPLETE CBC W/AUTO DIFF WBC: CPT

## 2022-11-24 PROCEDURE — 74011000250 HC RX REV CODE- 250: Performed by: THORACIC SURGERY (CARDIOTHORACIC VASCULAR SURGERY)

## 2022-11-24 PROCEDURE — 65610000006 HC RM INTENSIVE CARE

## 2022-11-24 PROCEDURE — U0005 INFEC AGEN DETEC AMPLI PROBE: HCPCS

## 2022-11-24 PROCEDURE — 87635 SARS-COV-2 COVID-19 AMP PRB: CPT

## 2022-11-24 PROCEDURE — 5A1955Z RESPIRATORY VENTILATION, GREATER THAN 96 CONSECUTIVE HOURS: ICD-10-PCS | Performed by: STUDENT IN AN ORGANIZED HEALTH CARE EDUCATION/TRAINING PROGRAM

## 2022-11-24 PROCEDURE — 74011250636 HC RX REV CODE- 250/636: Performed by: HOSPITALIST

## 2022-11-24 PROCEDURE — 74018 RADEX ABDOMEN 1 VIEW: CPT

## 2022-11-24 PROCEDURE — 85379 FIBRIN DEGRADATION QUANT: CPT

## 2022-11-24 PROCEDURE — 80053 COMPREHEN METABOLIC PANEL: CPT

## 2022-11-24 PROCEDURE — 94003 VENT MGMT INPAT SUBQ DAY: CPT

## 2022-11-24 PROCEDURE — 83880 ASSAY OF NATRIURETIC PEPTIDE: CPT

## 2022-11-24 PROCEDURE — 82803 BLOOD GASES ANY COMBINATION: CPT

## 2022-11-24 PROCEDURE — 74011250636 HC RX REV CODE- 250/636: Performed by: THORACIC SURGERY (CARDIOTHORACIC VASCULAR SURGERY)

## 2022-11-24 PROCEDURE — 31500 INSERT EMERGENCY AIRWAY: CPT

## 2022-11-24 PROCEDURE — 74011250636 HC RX REV CODE- 250/636: Performed by: PHYSICIAN ASSISTANT

## 2022-11-24 PROCEDURE — 0BH17EZ INSERTION OF ENDOTRACHEAL AIRWAY INTO TRACHEA, VIA NATURAL OR ARTIFICIAL OPENING: ICD-10-PCS | Performed by: STUDENT IN AN ORGANIZED HEALTH CARE EDUCATION/TRAINING PROGRAM

## 2022-11-24 PROCEDURE — 93970 EXTREMITY STUDY: CPT

## 2022-11-24 PROCEDURE — 74011000250 HC RX REV CODE- 250: Performed by: ANESTHESIOLOGY

## 2022-11-24 PROCEDURE — 84484 ASSAY OF TROPONIN QUANT: CPT

## 2022-11-24 PROCEDURE — 93306 TTE W/DOPPLER COMPLETE: CPT

## 2022-11-24 PROCEDURE — 85730 THROMBOPLASTIN TIME PARTIAL: CPT

## 2022-11-24 PROCEDURE — 83735 ASSAY OF MAGNESIUM: CPT

## 2022-11-24 PROCEDURE — 0202U NFCT DS 22 TRGT SARS-COV-2: CPT

## 2022-11-24 PROCEDURE — 94002 VENT MGMT INPAT INIT DAY: CPT

## 2022-11-24 PROCEDURE — 85520 HEPARIN ASSAY: CPT

## 2022-11-24 PROCEDURE — 74011636637 HC RX REV CODE- 636/637: Performed by: NURSE PRACTITIONER

## 2022-11-24 PROCEDURE — 82962 GLUCOSE BLOOD TEST: CPT

## 2022-11-24 PROCEDURE — 74011000250 HC RX REV CODE- 250: Performed by: NURSE PRACTITIONER

## 2022-11-24 RX ORDER — MIDAZOLAM HYDROCHLORIDE 1 MG/ML
2 INJECTION, SOLUTION INTRAMUSCULAR; INTRAVENOUS ONCE
Status: COMPLETED | OUTPATIENT
Start: 2022-11-24 | End: 2022-11-24

## 2022-11-24 RX ORDER — HEPARIN SODIUM 10000 [USP'U]/100ML
16-36 INJECTION, SOLUTION INTRAVENOUS
Status: DISCONTINUED | OUTPATIENT
Start: 2022-11-24 | End: 2022-11-26

## 2022-11-24 RX ORDER — ROCURONIUM BROMIDE 10 MG/ML
INJECTION, SOLUTION INTRAVENOUS AS NEEDED
Status: DISCONTINUED | OUTPATIENT
Start: 2022-11-24 | End: 2022-11-24 | Stop reason: HOSPADM

## 2022-11-24 RX ORDER — HEPARIN SODIUM 1000 [USP'U]/ML
80 INJECTION, SOLUTION INTRAVENOUS; SUBCUTANEOUS AS NEEDED
Status: DISCONTINUED | OUTPATIENT
Start: 2022-11-24 | End: 2022-11-24

## 2022-11-24 RX ORDER — ROCURONIUM BROMIDE 50 MG/5 ML
100 SYRINGE (ML) INTRAVENOUS
Status: COMPLETED | OUTPATIENT
Start: 2022-11-24 | End: 2022-11-24

## 2022-11-24 RX ORDER — DEXMEDETOMIDINE HYDROCHLORIDE 4 UG/ML
.1-1.5 INJECTION, SOLUTION INTRAVENOUS
Status: DISCONTINUED | OUTPATIENT
Start: 2022-11-24 | End: 2022-12-03

## 2022-11-24 RX ORDER — FUROSEMIDE 10 MG/ML
80 INJECTION INTRAMUSCULAR; INTRAVENOUS ONCE
Status: COMPLETED | OUTPATIENT
Start: 2022-11-24 | End: 2022-11-24

## 2022-11-24 RX ORDER — HEPARIN SODIUM 10000 [USP'U]/100ML
16-36 INJECTION, SOLUTION INTRAVENOUS
Status: DISCONTINUED | OUTPATIENT
Start: 2022-11-24 | End: 2022-11-24

## 2022-11-24 RX ORDER — HEPARIN SODIUM 1000 [USP'U]/ML
80 INJECTION, SOLUTION INTRAVENOUS; SUBCUTANEOUS AS NEEDED
Status: DISCONTINUED | OUTPATIENT
Start: 2022-11-24 | End: 2022-11-26

## 2022-11-24 RX ORDER — HEPARIN SODIUM 1000 [USP'U]/ML
40 INJECTION, SOLUTION INTRAVENOUS; SUBCUTANEOUS AS NEEDED
Status: DISCONTINUED | OUTPATIENT
Start: 2022-11-24 | End: 2022-11-24

## 2022-11-24 RX ORDER — PROPOFOL 10 MG/ML
0-50 VIAL (ML) INTRAVENOUS
Status: DISCONTINUED | OUTPATIENT
Start: 2022-11-24 | End: 2022-12-02 | Stop reason: ALTCHOICE

## 2022-11-24 RX ORDER — ETOMIDATE 2 MG/ML
20 INJECTION INTRAVENOUS ONCE
Status: COMPLETED | OUTPATIENT
Start: 2022-11-24 | End: 2022-11-24

## 2022-11-24 RX ORDER — ETOMIDATE 2 MG/ML
INJECTION INTRAVENOUS AS NEEDED
Status: DISCONTINUED | OUTPATIENT
Start: 2022-11-24 | End: 2022-11-24 | Stop reason: HOSPADM

## 2022-11-24 RX ORDER — HEPARIN SODIUM 1000 [USP'U]/ML
40 INJECTION, SOLUTION INTRAVENOUS; SUBCUTANEOUS AS NEEDED
Status: DISCONTINUED | OUTPATIENT
Start: 2022-11-24 | End: 2022-11-26

## 2022-11-24 RX ADMIN — HYDROMORPHONE HYDROCHLORIDE 1 MG: 1 INJECTION, SOLUTION INTRAMUSCULAR; INTRAVENOUS; SUBCUTANEOUS at 03:40

## 2022-11-24 RX ADMIN — HYDROMORPHONE HYDROCHLORIDE 0.5 MG: 1 INJECTION, SOLUTION INTRAMUSCULAR; INTRAVENOUS; SUBCUTANEOUS at 00:24

## 2022-11-24 RX ADMIN — HYDROMORPHONE HYDROCHLORIDE 0.5 MG: 1 INJECTION, SOLUTION INTRAMUSCULAR; INTRAVENOUS; SUBCUTANEOUS at 14:59

## 2022-11-24 RX ADMIN — FENTANYL CITRATE 100 MCG/HR: 50 INJECTION INTRAVENOUS at 21:06

## 2022-11-24 RX ADMIN — SODIUM CHLORIDE 4 UNITS/HR: 9 INJECTION, SOLUTION INTRAVENOUS at 08:48

## 2022-11-24 RX ADMIN — HEPARIN SODIUM 16 UNITS/KG/HR: 10000 INJECTION, SOLUTION INTRAVENOUS at 10:48

## 2022-11-24 RX ADMIN — ROCURONIUM BROMIDE 100 MG: 10 INJECTION, SOLUTION INTRAVENOUS at 03:01

## 2022-11-24 RX ADMIN — EPOPROSTENOL 30 NG/KG/MIN: 1.5 INJECTION, POWDER, LYOPHILIZED, FOR SOLUTION INTRAVENOUS at 14:49

## 2022-11-24 RX ADMIN — CHLORHEXIDINE GLUCONATE 0.12% ORAL RINSE 10 ML: 1.2 LIQUID ORAL at 09:00

## 2022-11-24 RX ADMIN — HYDROMORPHONE HYDROCHLORIDE 1 MG: 1 INJECTION, SOLUTION INTRAMUSCULAR; INTRAVENOUS; SUBCUTANEOUS at 05:35

## 2022-11-24 RX ADMIN — AMIODARONE HYDROCHLORIDE 400 MG: 200 TABLET ORAL at 17:18

## 2022-11-24 RX ADMIN — SODIUM CHLORIDE 5 MG/HR: 900 INJECTION, SOLUTION INTRAVENOUS at 00:05

## 2022-11-24 RX ADMIN — ETOMIDATE 20 MG: 2 INJECTION INTRAVENOUS at 03:02

## 2022-11-24 RX ADMIN — MIDAZOLAM 2 MG: 1 INJECTION INTRAMUSCULAR; INTRAVENOUS at 03:09

## 2022-11-24 RX ADMIN — HEPARIN SODIUM 4980 UNITS: 1000 INJECTION INTRAVENOUS; SUBCUTANEOUS at 20:38

## 2022-11-24 RX ADMIN — MAGNESIUM OXIDE TAB 400 MG (241.3 MG ELEMENTAL MG) 400 MG: 400 (241.3 MG) TAB at 17:19

## 2022-11-24 RX ADMIN — HEPARIN SODIUM 16 UNITS/KG/HR: 10000 INJECTION, SOLUTION INTRAVENOUS at 05:24

## 2022-11-24 RX ADMIN — Medication 0.8 MCG/KG/HR: at 12:40

## 2022-11-24 RX ADMIN — Medication 0.4 MCG/KG/HR: at 03:10

## 2022-11-24 RX ADMIN — MUPIROCIN: 20 OINTMENT TOPICAL at 17:20

## 2022-11-24 RX ADMIN — BUSPIRONE HYDROCHLORIDE 15 MG: 5 TABLET ORAL at 20:39

## 2022-11-24 RX ADMIN — MIDAZOLAM 5 MG: 1 INJECTION INTRAMUSCULAR; INTRAVENOUS at 05:35

## 2022-11-24 RX ADMIN — ETOMIDATE 16 MG: 2 INJECTION INTRAVENOUS at 03:01

## 2022-11-24 RX ADMIN — SODIUM CHLORIDE 5 MG/HR: 900 INJECTION, SOLUTION INTRAVENOUS at 05:55

## 2022-11-24 RX ADMIN — EPINEPHRINE 4 MCG/MIN: 1 INJECTION INTRAMUSCULAR; INTRAVENOUS; SUBCUTANEOUS at 03:44

## 2022-11-24 RX ADMIN — SODIUM CHLORIDE, PRESERVATIVE FREE 10 ML: 5 INJECTION INTRAVENOUS at 06:00

## 2022-11-24 RX ADMIN — Medication 1 MCG/KG/HR: at 21:27

## 2022-11-24 RX ADMIN — ACETAMINOPHEN 500 MG: 500 TABLET ORAL at 15:25

## 2022-11-24 RX ADMIN — HEPARIN SODIUM 18 UNITS/KG/HR: 10000 INJECTION, SOLUTION INTRAVENOUS at 22:56

## 2022-11-24 RX ADMIN — FENTANYL CITRATE 50 MCG/HR: 50 INJECTION INTRAVENOUS at 03:10

## 2022-11-24 RX ADMIN — Medication 0.8 MCG/KG/HR: at 08:43

## 2022-11-24 RX ADMIN — CHLORHEXIDINE GLUCONATE 0.12% ORAL RINSE 10 ML: 1.2 LIQUID ORAL at 18:00

## 2022-11-24 RX ADMIN — FUROSEMIDE 80 MG: 10 INJECTION, SOLUTION INTRAMUSCULAR; INTRAVENOUS at 05:01

## 2022-11-24 RX ADMIN — Medication 100 MG: at 03:02

## 2022-11-24 RX ADMIN — MUPIROCIN: 20 OINTMENT TOPICAL at 09:23

## 2022-11-24 RX ADMIN — SODIUM CHLORIDE 12 UNITS/HR: 9 INJECTION, SOLUTION INTRAVENOUS at 04:38

## 2022-11-24 RX ADMIN — BUSPIRONE HYDROCHLORIDE 15 MG: 5 TABLET ORAL at 15:53

## 2022-11-24 RX ADMIN — EPOPROSTENOL 30 NG/KG/MIN: 1.5 INJECTION, POWDER, LYOPHILIZED, FOR SOLUTION INTRAVENOUS at 04:45

## 2022-11-24 RX ADMIN — SODIUM CHLORIDE 15 MG/HR: 900 INJECTION, SOLUTION INTRAVENOUS at 03:28

## 2022-11-24 RX ADMIN — SODIUM CHLORIDE, PRESERVATIVE FREE 10 ML: 5 INJECTION INTRAVENOUS at 15:31

## 2022-11-24 RX ADMIN — SODIUM CHLORIDE, PRESERVATIVE FREE 10 ML: 5 INJECTION INTRAVENOUS at 22:00

## 2022-11-24 RX ADMIN — Medication 1 MCG/KG/HR: at 17:00

## 2022-11-24 RX ADMIN — SODIUM CHLORIDE 8.3 UNITS/HR: 9 INJECTION, SOLUTION INTRAVENOUS at 17:14

## 2022-11-24 RX ADMIN — SODIUM CHLORIDE 50 ML/HR: 9 INJECTION, SOLUTION INTRAVENOUS at 22:29

## 2022-11-24 NOTE — PROGRESS NOTES
[facility] ICU Progress Note    Admit Date: 11/15/2022  POD:  2 Day Post-Op    Procedure:  Procedure(s):  SYLWIA BY DR Sana Doherty -  CORONARY ARTERY BYPASS GRAFT  X 1 WITH LEFT INTERNAL MAMMARY ARTERY GRAFTING - AORTIC VALVE REPLACEMENT WITH MEYER 25MM INSPIRIS RESILIA TISSUE VALVE AND REPAIR OF ASCENDING AORTIC ANEURYSM        Subjective/Interval:   Pt seen with Dr. Francesca Alcaraz. Patient developed hypoxia at approximately 11pm last night requiring Bipap and was ultimately re-intubated at 0200 for acute respiratory failure. Bronchoscopy was completed and was unremarkable. CXR this am with small left effusion and atelectasis with slight pulmonary edema. CT output 450cc/24, no air leak. Stat TTE was attempted, however due to the patients body habitus views were limited. CXR  SYLWIA was completed this am with normal RV and LVF without effusion or shunting. CTA was no completed due to SERGE, He remains hypoxic, hypercarbic and slightly acidotic this am on [facility] with 450 tv, 12 of peep and 100% FiO2. Tm over the last 24 hours 101.0. He is hemodynamically stable on Epi @ 4 and Raymond synephrine @ 30, Mv02 64, CO/CI 6.3/3.7. Creatinine is improving 1.79 this am, good UOP 20804xq/24. Stat COVID this am is positive, Viral panel is pending. Objective:   Vitals:  Blood pressure (!) 110/55, pulse 86, temperature (!) 101.8 °F (38.8 °C), resp. rate 20, height 5' 9\" (1.753 m), weight 274 lb 7.6 oz (124.5 kg), SpO2 98 %.   Temp (24hrs), Av.6 °F (37.6 °C), Min:98.4 °F (36.9 °C), Max:101.8 °F (38.8 °C)    Hemodynamics:   CO: CO (l/min): 5.1 l/min   CI: CI (l/min/m2): 2.2 l/min/m2   CVP: CVP (mmHg): 11 mmHg (22 1030)   SVR: SVR (dyne*sec)/cm5: 954 (dyne*sec)/cm5 (22 8622)   PAP Systolic: PAP Systolic: 28 (58/66/43 9681)   PAP Diastolic: PAP Diastolic: 15 (00/36 2510)   PVR:     SV02: SVO2 (%): 57 % (22 1000)   SCV02:      EKG/Rhythm:    EKG Results       Procedure 720 Value Units Date/Time    EKG, 12 LEAD, INITIAL [[id number]]     Order Status: Sent     EKG, 12 LEAD, INITIAL [[id number]] Collected: 11/17/22 1532    Order Status: Completed Updated: 11/18/22 1017     Ventricular Rate 77 BPM      Atrial Rate 77 BPM      P-R Interval 164 ms      QRS Duration 82 ms      Q-T Interval 370 ms      QTC Calculation (Bezet) 418 ms      Calculated P Axis 49 degrees      Calculated R Axis -6 degrees      Calculated T Axis 108 degrees      Diagnosis --     Normal sinus rhythm  Minimal voltage criteria for LVH, may be normal variant ( R in aVL )  Nonspecific ST and T wave abnormality  Poor R-wave Progression (consider lead placement or loss of anterior forces)    Confirmed by Pratik Guaman MD, Kush Winn ([id number]) on 11/18/2022 10:17:13 AM      EKG, 12 LEAD, INITIAL [[id number]] Collected: 11/15/22 1728    Order Status: Completed Updated: 11/16/22 1136     Ventricular Rate 83 BPM      Atrial Rate 83 BPM      P-R Interval 160 ms      QRS Duration 84 ms      Q-T Interval 358 ms      QTC Calculation (Bezet) 420 ms      Calculated P Axis 50 degrees      Calculated R Axis 3 degrees      Calculated T Axis 126 degrees      Diagnosis --     Normal sinus rhythm  Left ventricular hypertrophy with repolarization abnormality  When compared with ECG of 14-SEP-2022 12:27,  No significant change was found  Confirmed by Pervis Agusto ([id number]) on 11/16/2022 11:35:53 AM              CT Output: 450 in 24 hrs    Ventilator:  Ventilator Volumes  Vt Set (ml): 450 ml (11/24/22 0718)  Vt Exhaled (Machine Breath) (ml): 490 ml (11/24/22 0718)  Vt Spont (ml): 455 ml (11/23/22 2349)  Ve Observed (l/min): 9.9 l/min (11/24/22 0718)        Oxygen Therapy:  Oxygen Therapy  O2 Sat (%): 98 % (11/24/22 1030)  Pulse via Oximetry: 85 beats per minute (11/24/22 1030)  O2 Device: Ventilator (11/24/22 0800)  Skin Assessment: Clean, dry, & intact (11/24/22 0800)  Skin Protection for O2 Device: N/A (11/24/22 0800)  O2 Flow Rate (L/min): 6 l/min (11/24/22 0800)  FIO2 (%): 100 % (11/24/22 0800)    CXR:  CXR Results  (Last 48 hours)                 11/24/22 0331  XR CHEST PORT Final result    Impression:      Satisfactory endotracheal tube placement. Stable minimal pulmonary edema. Narrative:  EXAM:  XR CHEST PORT       INDICATION: Intubated       COMPARISON: 11/24/2022 at 0144 hours       TECHNIQUE: Portable AP semiupright chest view at 0320 hours       FINDINGS: The endotracheal tube terminates above the whitney. The remaining   support devices are stable. The cardiomediastinal contours are stable. There are stable mild diffuse interstitial opacities. There is no pleural   effusion or pneumothorax. The bones and upper abdomen are stable. 11/24/22 0148  XR CHEST PORT Final result    Impression:      Stable support devices. Decreased minimal pulmonary edema. Narrative:  EXAM:  XR CHEST PORT       INDICATION: Desaturation       COMPARISON: 11/23/2022 at 0426 hours       TECHNIQUE: Portable AP upright chest view at 0144 hours       FINDINGS: The right IJ pulmonary artery catheter, mediastinal drain, and left   chest tube are stable. The cardiomediastinal contours are stable. There are decreased minimal diffuse interstitial opacities. There is no pleural   effusion or pneumothorax. The bones and upper abdomen are stable. 11/23/22 0453  XR CHEST PORT Final result    Impression:      Stable mild pulmonary edema. Narrative:  EXAM:  XR CHEST PORT       INDICATION: Mild pulmonary edema       COMPARISON: 11/22/2022 at 1513 hours       TECHNIQUE: Portable AP semiupright chest view at 0426 hours       FINDINGS: The endotracheal tube and enteric tube have been removed. The right IJ   pulmonary artery catheter, mediastinal drain, and left chest tube are stable. The cardiomediastinal contours are stable. There are stable mild diffuse interstitial opacities. There is no pleural   effusion or pneumothorax. The bones and upper abdomen are stable. 11/22/22 1523  XR CHEST PORT Final result    Impression:  New postsurgical changes. Tubes and lines in satisfactory position. Mild   pulmonary fluid overload. Narrative:  INDICATION:    post op heart        EXAMINATION:  AP CHEST, PORTABLE       COMPARISON: November 15       FINDINGS: Single AP portable view of the chest at 1513 hours demonstrates   interval median sternotomy, intubation, and placement of a NG tube in the   stomach. There is also a right IJ line, with a [place]-Yuly catheter tip in the   main pulmonary outflow tract. There is evidence of chest/mediastinal drains. There is no evidence of pneumothorax. There is mild pulmonary fluid overload. The cardiomediastinal silhouette is stable. There is no new airspace disease. Admission Weight: Last Weight   Weight: 257 lb 15 oz (117 kg) Weight: 274 lb 7.6 oz (124.5 kg)     Intake / Output / Drain:  Current Shift: 11/24 0701 - 11/24 1900  In: 1221.8 [I.V.:1221.8]  Out: 182 [Urine:625; Drains:40]  Last 24 hrs.:   Intake/Output Summary (Last 24 hours) at 11/24/2022 1056  Last data filed at 11/24/2022 0900  Gross per 24 hour   Intake 4021.79 ml   Output 2060 ml   Net 1961.79 ml         EXAM:  General: Intubated, sedated. Lungs:    Coarse to auscultation bilaterally   Incision:  Incision clean, dry and intact and prineo intact   Heart:   Regular rate and rhythm  and no murmurs, rubs or gallops   Abdomen:    Distended, hypoactive bowel sounds, and obese   Extremities:  Non-pitting edema BLE   Neurologic:  Intubated and sedated.           Labs:   Recent Labs     11/24/22  1005 11/24/22  0437 11/24/22  0405 11/24/22  0403 11/22/22  1615 11/22/22  1513   WBC  --   --   --  18.6*   < >  --    HGB  --   --   --  9.1*   < > 11.8*   HCT  --   --   --  28.7*   < > 34.5*   PLT  --   --   --  113*   < >  --    NA  --   --  134*  --    < >  --    K  --   --  5.1  --    < >  --    BUN  --   --  34*  --    < >  --    CREA  --   --  1.79*  -- < >  --    GLU  --   --  124*  --    < >  --    GLUCPOC 96   < >  --   --    < >  --    INR  --   --   --   --   --  1.1    < > = values in this interval not displayed. Assessment:     Active Problems:    Acute non-Q wave non-ST elevation myocardial infarction (NSTEMI) ([address] (11/15/2022)      Aortic stenosis (11/22/2022)      CAD (coronary artery disease) (11/22/2022)      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)      Overview: On pump CORONARY ARTERY BYPASS GRAFT  X 1 WITH LEFT INTERNAL MAMMARY       ARTERY to LAD      AORTIC VALVE REPLACEMENT WITH MEYER 25MM INSPIRIS RESILIA TISSUE VALVE       REPAIR OF ASCENDING AORTIC ANEURYSM with 26mm hemashield graft      S/P AVR (aortic valve replacement) and aortoplasty (11/22/2022)      Overview: On pump CORONARY ARTERY BYPASS GRAFT  X 1 WITH LEFT INTERNAL MAMMARY       ARTERY to LAD      AORTIC VALVE REPLACEMENT WITH MEYER 25MM INSPIRIS RESILIA TISSUE VALVE       REPAIR OF ASCENDING AORTIC ANEURYSM with 26mm hemashield graft       Plan/Recommendations/Medical Decision Making:     Severe symptomatic AS, s/p tissue AVR. Continue ASA. CAD, STEMI, s/p CABG. Continue ASA, amio, statin. Consider BB when off pressors. Acute hypoxic respiratory failure: Patient developed hypoxia at approximately 11pm last night requiring Bipap and was ultimately re-intubated at 0200 for acute respiratory failure. Bronchoscopy was completed and was unremarkable. CXR this am with small left effusion and atelectasis with slight pulmonary edema. CT output 450cc/24, no air leak. Stat TTE was attempted, however due to the patients body habitus views were limited. CXR  SYLWIA was completed this am with normal RV and LVF without effusion or shunting. CTA was no completed due to SERGE, He remains hypoxic, hypercarbic and slightly acidotic this am on [facility] with 450 tv, 12 of peep and 100% FiO2. Will Start heparin gtt this am for possible PE, may consider CT for PE once patient is more stable.   May consider steroids and antiviral for COVID. Gentle diuresis as needed. BLE US ordered. Acute kidney injury. Creatinine 2.01>1. 79. Nephrology consult appreciated, avoid nephrotoxins, trend. Acute blood loss anemia: H&H 9.1/28. 7. Monitor. Transaminitis. Avoid hepatotoxins, trend. Leukocytosis. WBC 20.6>18.6. Afebrile. Likely atelectasis vs possible PE, Sputum culture sent. Post-op pain control. Continue Dilaudid PCA. HTN. On ACEI, BB PTA; resume BB when appropriate. HLD. Continue statin. WALLY , not on CPAP. Was unable to tolerate due to anxiety; he has been working on this with Sleep Medicine. OP follow-up. DMII. On Toujeo at home. Repeat A1C 9.0- Insulin gtt per protocol. Diabetes management following. Hypothyroidism. On Synthroid PTA; continue. Repeat TSH 1.96. GERD. Continue PPI. Anxiety and depression. On Buspar, Zoloft; continue. Supportive care. Obesity. BMI 40.53%. Diet and exercise counseling. DVT ppx- SCDs, heparin.   Dispo- ICU today       Signed By: Esteban Gautam PA-C

## 2022-11-24 NOTE — PROGRESS NOTES
0302: Anesthesia, CRNA, RT, Link Gabrielar, NP and primary RN at bedside for intubation, meds administered, see MAR.     0305: bilateral breath sounds and positive color change noted.  ETT secured, STAT CXR ordered

## 2022-11-24 NOTE — CONSULTS
ICU DAILY PROGRESS NOTE      Summary  35 y/o male POD 1 from CABG x1 an AV valve replacement. He arrived intubated and on pressors. Extubated 11/22 on POD 0 @ 19:00. Noted to have SERGE with creatinine rise 2. Possibly secondary to hypotension in OR. Night of 11/23 was worked up for hypoxemia. This morning he tested positive for Covid. Case discussed with cT surgery. Most likely diagnosis is PE most likely secondary to hypercoaguable state from Covid. Pt had no respiratory symptoms preop or post op suggestive of URI. Previous 24 Hours    Overnight developed hypoxemia. Pt had bronch and SYLWIA neither of which explained cause for hypoxemia. He required re-intubation and was put on veletri. [facility] Day 9    Critical Care Problems    Patient Active Problem List   Diagnosis Code    DM (diabetes mellitus) ([facility] E11.9    Acquired hypothyroidism E03.9    Essential hypertension I10    Fibromyalgia M79.7    Microalbuminuria R80.9    Anxiety F41.9    Migraine without aura G43.009    Hypertriglyceridemia E78.1    Severe obesity ([facility]) E66.01    Transient ischemic attack involving left internal carotid artery G45. 1    Chest pain R07.9    Unstable angina ([facility]) I20.0    Coronary artery disease involving native coronary artery of native heart with unstable angina pectoris ([facility]) I25.110    Type 2 diabetes with nephropathy ([facility]) E11.21    Dysthymia F34.1    Adjustment disorder with mixed emotional features F43.29    Anxiety disorder due to general medical condition with panic attack F06.4, F41.0    Insomnia disorder with non-sleep disorder mental comorbidity G47.00    Dizzy spells R42    Multiple lacunar infarcts ([facility]) I63.81    Cervical spondylosis with radiculopathy M47.22    Low back pain M54.50    Corneal abrasion, right S05. 01XA    Stable proliferative diabetic retinopathy of both eyes associated with type 1 diabetes mellitus ([facility] T27.5082    Bilateral carotid artery stenosis I65.23    Acute non-Q wave non-ST elevation myocardial infarction (NSTEMI) ([facility]) I21.4    Aortic stenosis I35.0    CAD (coronary artery disease) I25.10    S/P CABG x 1 Z95.1    S/P AVR (aortic valve replacement) and aortoplasty Z95.2         Plan    Neuro  -continue sedation  - pt able to follow commands on current level of sedation    CVS  CAD s/p CABG  - wean pressors to maintain MAP > 65 and SVO2 > 65  - cont. amio    Pulm  Acute Postoperative respiratory failure  Suspected Pulmonary emboli secondary to covid 19  - continue vent support  - on veletri  - starting heparin drip for PE    GI  - on famotidine    Renal  SERGE- resolving  - Nephrology now following and reviewed their consult;   - Maintain perfusion and avoid any nephrotoxic agents. -replete K and Mg as needed    Endo  Hyperglycemia  Hypothyroidism  - continue insulin drip, BS goal   - cont levothyroxine    ID  Covid 19  - Discussed with CT surgery and PharmD. Based on presentation does not appear to be active Covid disease but sequelae from recent exposure causing hypercoaguable state leading to PE. Will hold on steroids and additional treatment at this time.    - Rapid covid returned positive but PCR returned negative.  Repeating PCR covid    Heme  -Heparin drip    Vitals  Visit Vitals  BP (!) 104/59   Pulse 84   Temp (!) 102.2 °F (39 °C)   Resp 20   Ht 5' 9\" (1.753 m)   Wt 124.5 kg (274 lb 7.6 oz)   SpO2 98%   BMI 40.53 kg/m²    O2 Flow Rate (L/min): 6 l/min O2 Device: Ventilator Temp (24hrs), Av.8 °F (37.7 °C), Min:98.6 °F (37 °C), Max:102.2 °F (39 °C)    CVP (mmHg): 11 mmHg (22 1030)      Intake/Output:     Intake/Output Summary (Last 24 hours) at 2022 1131  Last data filed at 2022 1100  Gross per 24 hour   Intake 4021.79 ml   Output 2495 ml   Net 1526.79 ml           Physical exam:        I have examined the patient on this day 2022 and the above documented exam is accurate including the components that have been copied forward    LABS AND DATA: Personally reviewed  Recent Labs     11/24/22 0403 11/24/22  0321   WBC 18.6* 16.4*   HGB 9.1* 9.2*   HCT 28.7* 29.2*   * 107*       Recent Labs     11/24/22  0405 11/24/22  0321 11/23/22  0456 11/23/22  0249   * 133*   < > 140   K 5.1 5.0   < > 5.6*    102   < > 110*   CO2 27 26   < > 23   BUN 34* 34*   < > 26*   CREA 1.79* 1.82*   < > 1.94*   * 122*   < > 132*   CA 8.6 8.5   < > 9.0   MG 2.2  --   --  2.4    < > = values in this interval not displayed. Recent Labs     11/24/22 0405 11/24/22 0321    110   TP 5.8* 5.8*   ALB 2.9* 3.0*   GLOB 2.9 2.8       Recent Labs     11/24/22 0403 11/22/22  1513   INR  --  1.1   PTP  --  11.2*   APTT 26.0 26.4        No results for input(s): PHI, PCO2I, PO2I, FIO2I in the last 72 hours. No results for input(s): CPK, CKMB, TROIQ, BNPP in the last 72 hours. Hemodynamics:   PAP: PAP Systolic: 32 (80/76/36 7125) CO: CO (l/min): 6 l/min (11/24/22 1100)   Wedge:   CI: CI (l/min/m2): 2.6 l/min/m2 (11/24/22 1100)   CVP:  CVP (mmHg): 11 mmHg (11/24/22 1030) SVR:       PVR:       Ventilator Settings:  Mode Rate Tidal Volume Pressure FiO2 PEEP   Assist control   450 ml  5 cm H2O 100 % 12 cm H20     Peak airway pressure: 31 cm H2O    Minute ventilation: 9.6 l/min        MEDS: Reviewed    Chest X-Ray:  CXR Results  (Last 48 hours)                 11/24/22 0331  XR CHEST PORT Final result    Impression:      Satisfactory endotracheal tube placement. Stable minimal pulmonary edema. Narrative:  EXAM:  XR CHEST PORT       INDICATION: Intubated       COMPARISON: 11/24/2022 at 0144 hours       TECHNIQUE: Portable AP semiupright chest view at 0320 hours       FINDINGS: The endotracheal tube terminates above the whitney. The remaining   support devices are stable. The cardiomediastinal contours are stable. There are stable mild diffuse interstitial opacities. There is no pleural   effusion or pneumothorax.  The bones and upper abdomen are stable. 11/24/22 0148  XR CHEST PORT Final result    Impression:      Stable support devices. Decreased minimal pulmonary edema. Narrative:  EXAM:  XR CHEST PORT       INDICATION: Desaturation       COMPARISON: 11/23/2022 at 0426 hours       TECHNIQUE: Portable AP upright chest view at 0144 hours       FINDINGS: The right IJ pulmonary artery catheter, mediastinal drain, and left   chest tube are stable. The cardiomediastinal contours are stable. There are decreased minimal diffuse interstitial opacities. There is no pleural   effusion or pneumothorax. The bones and upper abdomen are stable. 11/23/22 0453  XR CHEST PORT Final result    Impression:      Stable mild pulmonary edema. Narrative:  EXAM:  XR CHEST PORT       INDICATION: Mild pulmonary edema       COMPARISON: 11/22/2022 at 1513 hours       TECHNIQUE: Portable AP semiupright chest view at 0426 hours       FINDINGS: The endotracheal tube and enteric tube have been removed. The right IJ   pulmonary artery catheter, mediastinal drain, and left chest tube are stable. The cardiomediastinal contours are stable. There are stable mild diffuse interstitial opacities. There is no pleural   effusion or pneumothorax. The bones and upper abdomen are stable. 11/22/22 1523  XR CHEST PORT Final result    Impression:  New postsurgical changes. Tubes and lines in satisfactory position. Mild   pulmonary fluid overload. Narrative:  INDICATION:    post op heart        EXAMINATION:  AP CHEST, PORTABLE       COMPARISON: November 15       FINDINGS: Single AP portable view of the chest at 1513 hours demonstrates   interval median sternotomy, intubation, and placement of a NG tube in the   stomach. There is also a right IJ line, with a [place]-Yuly catheter tip in the   main pulmonary outflow tract. There is evidence of chest/mediastinal drains. There is no evidence of pneumothorax.  There is mild pulmonary fluid overload. The cardiomediastinal silhouette is stable. There is no new airspace disease. Multidisciplinary Rounds Completed:  N/A      DISPOSITION  Stay in ICU    CRITICAL CARE CONSULTANT NOTE  I had a in-person encounter with Lalita Cano, reviewed and interpreted patient data including events, labs, images, vital signs, I/O's, and examined patient. I have discussed the case and the plan and management of the patient's care with the consulting services, the bedside nurses and the respiratory therapist.      NOTE OF PERSONAL INVOLVEMENT IN CARE   This patient is at high risk for sudden and clinically significant deterioration, which requires the highest level of preparedness to intervene urgently. I participated in the decision-making and personally managed or directed the management of the following life and organ supporting interventions that required my frequent assessment to treat or prevent imminent deterioration. I personally spent 65 minutes of critical care time. This is time spent at patient's bedside actively involved in patient care as well as the coordination of care and discussions with the patient's family. This does not include any procedural time which has been billed separately.       ------------------------------------------------------------------------------    Petar Peguero MD, PhD  [address] [phone]

## 2022-11-24 NOTE — PROGRESS NOTES
[facility]  Bronchoscopy    Procedure: Therapeutic bronchoscopy. Indication: Abnormal chest imaging    Consent/Treatment: Informed consent was obtained from the   emergent  after risks, benefits and alternatives were explained. Timeout verified the correct patient and correct procedure. Anesthesia:   Per MAR. Moderate ( conscious ) sedation was administered by the endoscopy nurse and supervised by the endoscopist. The following parameters were monitored: oxygen saturation, heart rate, blood pressure, respiratory rate, EKG, adequacy of pulmonary ventilation and response to care. Total physician intraservice time was 20min    Procedure Details:   -- The bronchoscope was introduced through an endotracheal tube. -- The trachea and whitney were completely inspected and were found to be normal.  -- The right-sided endobronchial anatomy was completely inspected and was found to be normal with minimal secretions. -- The left-sided endobronchial anatomy was completely inspected and was found to be normal with minimal secretions.            Complications: none    Estimated Blood Loss: Minimal    Lew Blackwood MD

## 2022-11-24 NOTE — PROGRESS NOTES
35 y/o m pt admitted to the ICU s/p CABG x1, AVR, and ascending aortic aneurysm repair on 11/22.    0235- called to bedside for hypoxia. As per RN, pt started becoming progressively more hypoxic over the previous hour. Upon arrival to bedside p's SPO2 82% on Bipap FiO2 100% 20/15, RR 30's. Attempted to suction pt without return of secretions. ABG done= 7.39/44/51/26. Decision made to intubate pt. Anesthesia called to intubate due to difficult airway. Pt intubated successfully by the anesthesiologist. Holder Shirley intubation pt's Spo2 80%. CXR showed good position of ETT with mild pulmonary edema, improved from yesterday, no focal opacities. C/f shunt vs PE. Called Dr. Lili Sawant to discuss. Decision made to hold off on CTA due to SERGE. Will get Stat TTE with bubble study to assess for shunt and also to look at the RV to assess for RV strain. Will also start on inhaled epoprostenol and call in Dr. Nicolás Stahl to do bronchoscopy. After starting inhaled epoprostenol pt's SPO2 improved to 91% on 100% FiO2 and 8 of peep. Will continue to monitor and reassess once all studies are done.     Warner Naqvi NP  [facility], [place] Critical care  11/24/22

## 2022-11-24 NOTE — PROCEDURES
SYLWIA        Procedure Details: probe placement, image aquisition & interpretation        Procedure Note    Performed by: Ye Castañeda MD  Authorized by: Ye Castañeda MD     Indications: assessment of ascending aorta, assessment of surgical repair and suspected pericardial effusion  Modalities: PWD, contrast, CWD, CF, 2D  Probe Type: multiplane  Insertion: atraumatic  Patient Status: intubated  Echocardiographic and Doppler Measurements   Aorta  Size  Diam(cm)  Dissection PlaqueThick(mm)  Plaque Mobile    Ascending normal  No 0-3     Arch normal  No      Descending normal  No            Valves  Annulus  Stenosis  Area/Grad  Regurg  Leaflet   Morph  Leaflet   Motion    Aortic bioprosthetic none  0 normal normal    Mitral normal   1+ normal normal    Tricuspid normal   1+ normal normal          Atria  Size  SEC (smoke)  Thrombus  Tumor  Device    Rt Atrium normal No No No     Lt Atrium normal No No No      Interatrial Septum Morphology: normal    Interventricular Septum Morphology: normal  Ventricle  Cavity Size  Cavity Dimension Hypertrophy  Thrombus  Gloal FXN  EF    RV   No  mildly impaired     LV   Yes  normal        Regional Function  (1 = normal, 2 = mildly hypokinetic, 3 = severely hypokinetic, 4 = akinetic, 5 = dyskinetic) LAV - [place]   ME LAV = 0  ME LAV = 90  ME LAV = 130   Basal Sept:1 Basal Ant:1 Basal Post:1   Mid Sept:1 Mid Ant:1 Mid Post:1   Apical Sept:1 Apical Ant:1 Basal Ant Sept:1   Basal Lat:1 Basal Inf:1 Mid Ant Sept:1   Mid Lat:1 Mid Inf:1    Apical Lat:1 Apical Inf:1        Pericardium: thickening  Effusion: normal  Post Intervention Follow-up Study         Valve  Function  Regurgitation  Area    Aortic       Mitral       Tricuspid       Prosthetic          Comments: Brief Bed side SYLWIA to r/o PFO  LV underfilled, hyperdynamic, RV mildly dysfunctional , small in size due to small clot /pericardial collection. No signs of tamponade.  BP is in 130s,  NO clot in RV no evidence of PE. Mild MR, MIld TR. LA and RA are small in size. IAS normal with no evidence of shunt or PFO. Bubble study done with no evidence of PFO. Aortic valve is well seated, leaflets are functioning normally. NO AS/AI or PVL. Small collection in pericardium about 1 cm thick around the RV but no signs of tamponade.

## 2022-11-24 NOTE — PROGRESS NOTES
1900 Bedside shift change report given to Stephanie Willett RN (oncoming nurse) by Randolph Rob RN (offgoing nurse). Report included the following information SBAR, Kardex, ED Summary, OR Summary, Procedure Summary, Intake/Output, MAR, Accordion, Recent Results, Med Rec Status, Cardiac Rhythm SR, and Alarm Parameters . 2000 Assessment complete. Detail assessment in flowsheet. 0000 Assessment complete. Detail assessment in flowsheet. 0216 Patient started to desat to 88%, RRT made notified, increased FI02 from 50% to 100%. 4841 Patient is still desatting to 88% RRT called to make any recommendations. 46 RN called NP for help on desatting to 88%. 5681 NP recommended to reintubate patient. Due to patient's trachea is narrow, he is a difficult to intubate per providers. Waiting on Anesthesia.    0302 Patient reintubated with 7.5 cm to 22 at the teeth. NP called family and cardiac surgery to inform. [address] ordered by NP, waiting on Velitri. 0400 Still waiting on Velitri. Assessment complete. Detail assessment in flowsheet. 0600 SYLWIA is at bedside.     0700 Bedside report given to Arianna/KG Armendariz

## 2022-11-24 NOTE — PROGRESS NOTES
NAME: Fernando Ochoa        :  1980        MRN:  [id number]                     Assessment   :                                               Plan:  SERGE  hyperkalemia  DM  PVD  CAD  S/P CABGX1  S/P AVR  Hypotension    SERGE  likely due to ATN from hypotension. UA with protein and blood. He has a pennington. Hold on renal imaging. Fair  UO. He appears to be volume overloaded. There is no acute need for dialysis. Creatinine about the same. OK to diurese as needed. Potassium better. Check BMP this pm.    WYATT RN    Patient is critically ill and at significant risk of deterioration. Subjective:     Chief Complaint:  Intubated. Sedated. Review of Systems:    Symptom Y/N Comments  Symptom Y/N Comments   Fever/Chills    Chest Pain     Poor Appetite    Edema     Cough    Abdominal Pain     Sputum    Joint Pain     SOB/COLE    Pruritis/Rash     Nausea/vomit    Tolerating PT/OT     Diarrhea    Tolerating Diet     Constipation    Other       Could not obtain due to:      Objective:     VITALS:   Last 24hrs VS reviewed since prior progress note.  Most recent are:  Visit Vitals  /71   Pulse 84   Temp (!) 101.8 °F (38.8 °C)   Resp 20   Ht 5' 9\" (1.753 m)   Wt 124.5 kg (274 lb 7.6 oz)   SpO2 97%   BMI 40.53 kg/m²       Intake/Output Summary (Last 24 hours) at 2022 1031  Last data filed at 2022 0900  Gross per 24 hour   Intake 4021.79 ml   Output 2060 ml   Net 1961.79 ml      Telemetry Reviewed:     PHYSICAL EXAM:  General: NAD  1+ edema      Lab Data Reviewed: (see below)    Medications Reviewed: (see below)    PMH/SH reviewed - no change compared to H&P  ________________________________________________________________________  Care Plan discussed with:  Patient     Family      RN     Care Manager                    Consultant:          Comments   >50% of visit spent in counseling and coordination of care ________________________________________________________________________  Leydi Roy MD     Procedures: see electronic medical records for all procedures/Xrays and details which  were not copied into this note but were reviewed prior to creation of Plan. LABS:  Recent Labs     11/24/22  0403 11/24/22  0321   WBC 18.6* 16.4*   HGB 9.1* 9.2*   HCT 28.7* 29.2*   * 107*     Recent Labs     11/24/22  0405 11/24/22  0321 11/23/22  1653 11/23/22  0456 11/23/22  0249 11/22/22  1842   * 133* 136   < > 140 141   K 5.1 5.0 5.4*   < > 5.6* 4.8    102 104   < > 110* 110*   CO2 27 26 26   < > 23 23   BUN 34* 34* 34*   < > 26* 22*   CREA 1.79* 1.82* 2.22*   < > 1.94* 1.62*   * 122* 109*   < > 132* 176*   CA 8.6 8.5 9.1   < > 9.0 9.1   MG 2.2  --   --   --  2.4 2.7*    < > = values in this interval not displayed. Recent Labs     11/24/22  0405 11/24/22 0321 11/23/22  1437    110 91   TP 5.8* 5.8* 5.4*   ALB 2.9* 3.0* 3.2*   GLOB 2.9 2.8 2.2     Recent Labs     11/24/22  0403 11/22/22  1513   INR  --  1.1   PTP  --  11.2*   APTT 26.0 26.4      No results for input(s): FE, TIBC, PSAT, FERR in the last 72 hours. No results found for: FOL, RBCF   Recent Labs     11/24/22  0626 11/24/22  0240   PH 7.32* 7.39   PCO2 50* 44   PO2 66* 51*     No results for input(s): CPK, CKMB in the last 72 hours.     No lab exists for component: TROPONINI  No components found for: [place]  Lab Results   Component Value Date/Time    Color DARK YELLOW 11/23/2022 02:37 PM    Appearance TURBID (A) 11/23/2022 02:37 PM    Specific gravity 1.026 11/23/2022 02:37 PM    Specific gravity 1.010 11/16/2022 12:27 PM    pH (UA) 5.0 11/23/2022 02:37 PM    Protein 100 (A) 11/23/2022 02:37 PM    Glucose Negative 11/23/2022 02:37 PM    Ketone TRACE (A) 11/23/2022 02:37 PM    Bilirubin Negative 11/16/2022 12:27 PM    Urobilinogen 1.0 11/23/2022 02:37 PM    Nitrites Negative 11/23/2022 02:37 PM    Leukocyte Esterase TRACE (A) 11/23/2022 02:37 PM    Epithelial cells MODERATE (A) 11/23/2022 02:37 PM    Bacteria 3+ (A) 11/23/2022 02:37 PM    WBC 10-20 11/23/2022 02:37 PM    RBC  11/23/2022 02:37 PM       MEDICATIONS:  Current Facility-Administered Medications   Medication Dose Route Frequency    dexmedeTOMidine in 0.9 % NaCl (PRECEDEX) 400 mcg/100 mL (4 mcg/mL) infusion soln  0.1-1.5 mcg/kg/hr IntraVENous TITRATE    fentaNYL (PF) 1,500 mcg/30 mL (50 mcg/mL) infusion  0-200 mcg/hr IntraVENous TITRATE    epoprostenol (VELETRI) 30 mcg/mL in 0.9% sodium chloride 50 mL inhalation solution  30 ng/kg/min (Ideal) Inhalation CONTINUOUS    propofol (DIPRIVAN) 10 mg/mL infusion  0-50 mcg/kg/min IntraVENous TITRATE    heparin 25,000 units in D5W 250 ml infusion  16-36 Units/kg/hr IntraVENous TITRATE    heparin (porcine) 1,000 unit/mL injection 4,980 Units  40 Units/kg IntraVENous PRN    Or    heparin (porcine) 1,000 unit/mL injection 9,960 Units  80 Units/kg IntraVENous PRN    0.9% sodium chloride infusion  50 mL/hr IntraVENous CONTINUOUS    HYDROcodone-acetaminophen (NORCO) 5-325 mg per tablet 1 Tablet  1 Tablet Oral Q4H PRN    HYDROcodone-acetaminophen (NORCO) 7.5-325 mg per tablet 1 Tablet  1 Tablet Oral Q4H PRN    HYDROmorphone (DILAUDID) injection 1 mg  1 mg IntraVENous Q4H PRN    HYDROmorphone (DILAUDID) injection 0.5 mg  0.5 mg IntraVENous Q4H PRN    acetaminophen (TYLENOL) tablet 500 mg  500 mg Oral Q6H PRN    niCARdipine (CARDENE) 25 mg in 0.9% sodium chloride 250 mL ([id number])  5-15 mg/hr IntraVENous TITRATE    sodium chloride (NS) flush 5-40 mL  5-40 mL IntraVENous Q8H    sodium chloride (NS) flush 5-40 mL  5-40 mL IntraVENous PRN    ondansetron (ZOFRAN) injection 4 mg  4 mg IntraVENous PRN    bacitracin 500 unit/gram packet 1 Packet  1 Packet Topical PRN    0.45% sodium chloride infusion  10 mL/hr IntraVENous CONTINUOUS    naloxone (NARCAN) injection 0.4 mg  0.4 mg IntraVENous PRN    mupirocin (BACTROBAN) 2 % ointment   Both Nostrils BID    amiodarone (CORDARONE) tablet 400 mg  400 mg Oral BID    ondansetron (ZOFRAN) injection 4 mg  4 mg IntraVENous Q4H PRN    albuterol (PROVENTIL VENTOLIN) nebulizer solution 2.5 mg  2.5 mg Nebulization Q4H PRN    aspirin chewable tablet 81 mg  81 mg Oral DAILY    midazolam (VERSED) injection 1 mg  1 mg IntraVENous Q1H PRN    chlorhexidine (PERIDEX) 0.12 % mouthwash 10 mL  10 mL Oral BID    magnesium oxide (MAG-OX) tablet 400 mg  400 mg Oral BID    calcium chloride 1 g in 0.9% sodium chloride 250 mL IVPB  1 g IntraVENous PRN    polyethylene glycol (MIRALAX) packet 17 g  17 g Oral DAILY    senna-docusate (PERICOLACE) 8.6-50 mg per tablet 1 Tablet  1 Tablet Oral DAILY    ELECTROLYTE REPLACEMENT NOTE: Nurse to review Serum Potassium and Magnesuim levels and Initiate Electrolyte Replacement Protocol as needed  1 Each Other PRN    magnesium sulfate 1 g/100 ml IVPB (premix or compounded)  1 g IntraVENous PRN    glucose chewable tablet 16 g  4 Tablet Oral PRN    glucagon (GLUCAGEN) injection 1 mg  1 mg IntraMUSCular PRN    insulin lispro (HUMALOG) injection   SubCUTAneous AC&HS    insulin glargine (LANTUS) injection 1-50 Units  1-50 Units SubCUTAneous ONCE OVER 24 HOURS    lactated ringers bolus infusion 250 mL  250 mL IntraVENous Q1H PRN    dextrose 10 % infusion 0-250 mL  0-250 mL IntraVENous PRN    melatonin tablet 3-6 mg  3-6 mg Oral QHS PRN    EPINEPHrine (ADRENALIN) 5 mg in 0.9% sodium chloride 250 mL infusion  0-10 mcg/min IntraVENous TITRATE    lidocaine 4 % patch 2 Patch  2 Patch TransDERmal Q24H    insulin regular (NOVOLIN R, HUMULIN R) 100 Units in 0.9% sodium chloride 100 mL infusion  1-25 Units/hr IntraVENous TITRATE    levothyroxine (SYNTHROID) tablet 125 mcg  125 mcg Oral 6am    pantoprazole (PROTONIX) tablet 40 mg  40 mg Oral ACB    LORazepam (ATIVAN) tablet 1 mg  1 mg Oral Q8H PRN    rosuvastatin (CRESTOR) tablet 40 mg  40 mg Oral DAILY    sertraline (ZOLOFT) tablet 100 mg  100 mg Oral DAILY busPIRone (BUSPAR) tablet 15 mg  15 mg Oral TID

## 2022-11-24 NOTE — PROGRESS NOTES
STAT echo attempted. Patient had an echo on 11-16-22 and the study was TD due to body habitus. Dr. Malik Aleman on phone during exam, and decided a SYLWIA woul'd be more effective.

## 2022-11-24 NOTE — ANESTHESIA PROCEDURE NOTES
Emergent Intubation  Performed by: Nikunj Saunders MD  Authorized by: Nikunj Saunders MD     Emergent Intubation:   Location:  ICU  Date/Time:  11/24/2022 3:03 AM  Indications:  Respiratory failure  Spontaneous Ventilation: present    Level of Consciousness: awake  Preoxygenated: Yes      Airway Documentation:   Airway:  ETT - Cuffed  Technique:  Rapid sequence  Advanced Technique:  Glide scope  Insertion Site:  Oral  Blade Type:  Radha  Blade Size:  3  ETT size (mm):  7.5  ETT Line Ramírez:  Teeth  ETT Insertion depth (cm):  23  Placement verified by: auscultation, EtCO2 and BBS    Attempts:  1  Difficult airway: No    Anterior airway. Used glidescope S4 with rigid stylet.  Maskable with oral airway and 2 providers

## 2022-11-24 NOTE — PROGRESS NOTES
0715- Bedside and Verbal shift change report given to 0 [facility] (oncoming nurse) by Aissatou Sena RN (offgoing nurse). Report included the following information SBAR, Kardex, Intake/Output, Recent Results, Cardiac Rhythm NSR, and Alarm Parameters . 0800- Shift assessment completed; see flow sheet for details. Pt is drowsy but responds to voice with eye opening, once awake able to follow commands, is oriented x4, falls asleep very shortly after conversation and sometimes mid-conversation, voicing visual hallucinations. Currently in NSR; BP stable, remains on Epi, maintaining cardiac index/output. Lungs are diminished, remains on NC~6LPM, breathing is shallow, HOB elevated. ABD is semi-soft; BS hypoactive. Strong catheter in place; adequate UOP. Skin is warm and dry; sternotomy and SVG sites are CDI. Pt continues to voice complaints of pain/discomfort despite PCA; pt repositioned. CT surg rounding at the bedside; see progress note for details. Plans for continue Epi gtt, repeat labs mid-day, up OOB, pulmonary toileting, changing Fentayl PCA to Dilaudid PCA    1020- Pt assisted to the recliner chair with 2 person assist, more so for line management; tolerated transfer fair well. 1210- Reassessment completed; see flow sheet for details. Pt appears more drowsy, lethargic, still awakens to voice however drifts back off to sleep more quickly during conversation. Will alert MD for further interventions. Nephrology aware of elevated K+, see EMAR for medication details. 1230- Dilaudid PCA stopped; spoke with pharmacy to determine other multimodals for pain management. Pt given Narcan x1    1420- Pt assisted back to bed with 2-3 person assist. Dr. Zhang Francis at the bedside and limited ECHO performed; with no significant findings. Cont current plan of care    1600- Reassessment completed; see flow sheet for details. Pt a little more awake, however remains drowsy, easily aroused. Able to follow commands.      Repeat BMP obtained and reviewed with Dr. Luz Peters via telephone; verbal order for IV Bumex x1    1820- Pt's mother at the bedside, verbal update given. Pt consuming dinner meal     1940- Bedside and Verbal shift change report given to DIONE Serna RN (oncoming nurse) by Rubi Alex RN (offgoing nurse). Report included the following information SBAR, Kardex, Intake/Output, Recent Results, Cardiac Rhythm NSR, and Alarm Parameters .

## 2022-11-24 NOTE — PROGRESS NOTES
Problem: Falls - Risk of  Goal: *Absence of Falls  Description: Document Matilde Embs Fall Risk and appropriate interventions in the flowsheet.   Outcome: Progressing Towards Goal  Note: Fall Risk Interventions:  Mobility Interventions: Bed/chair exit alarm, Assess mobility with egress test, PT Consult for mobility concerns, PT Consult for assist device competence, Strengthening exercises (ROM-active/passive)    Mentation Interventions: Adequate sleep, hydration, pain control, Bed/chair exit alarm, Door open when patient unattended, Evaluate medications/consider consulting pharmacy, More frequent rounding, Increase mobility, Reorient patient, Room close to nurse's station, Toileting rounds, Update white board    Medication Interventions: Assess postural VS orthostatic hypotension, Bed/chair exit alarm    Elimination Interventions: Bed/chair exit alarm, Toileting schedule/hourly rounds    History of Falls Interventions: Evaluate medications/consider consulting pharmacy, Bed/chair exit alarm, Room close to nurse's station, Vital signs minimum Q4HRs X 24 hrs (comment for end date)         Problem: Non-Violent Restraints  Goal: Removal from restraints as soon as assessed to be safe  Outcome: Progressing Towards Goal  Goal: No harm/injury to patient while restraints in use  Outcome: Progressing Towards Goal  Goal: Patient's dignity will be maintained  Outcome: Progressing Towards Goal  Goal: Patient Interventions  Outcome: Progressing Towards Goal

## 2022-11-24 NOTE — PROGRESS NOTES
0700: [facility] 0800: Shift assessment completed. Pt is currently intubated and sedated. Pt responds to pain. He will \"nod\" his head appropriately to questions. Pt is noted to be febrile with temp of 101.8. Pt is sinus rhythm on the monitor. Pt has ventricular pacemaker to pulse generator off. Lung sounds are diminished bilaterally. Vent settings: A/C - R 16, , FiO2 100%, and PEEP 12. Pt has ETT. Bowel sounds are hypoactive. Pt has a pennington that is patent and draining. Pt has a MAC and Bryan. Pt is in bilateral restraints. Precedex infusing @ 0.8 mcg/kg/hr. EPI infusing @ 4 mcg/min. Fentanyl infusing @ 50 mcg/hr. Insulin drip infusing. 3187: Rapid COVID test positive, provider notified. 1048: Heparin infusion started, infusing @ 16 U/kg/hr. 1104: Fentanyl infusion titrated per MD order. Pt nodded head \"yes\" when asked if he was in pain. Now infusing @ 75 mcg/hr. 1200: Shift reassessment completed, no changes to previous assessment. 1230: NGT placed, KUB ordered to confirmed placement. 1308: Fentanyl infusion titrated again, pt nodded head \"yes\" when asked if still in pain. Now infusing @ 100 mcg/hr. 1310: Precedex titrated per MD order, see MAR for amount. 1314: NGT placement confirmed via KUB and can be used now. 1432: SARS- CoV-2, PCR test indicates results of \"none detected\", Dr. Jerome Francis advised. Test to be done again. 1459: PRN Dilaudid 0.5 mg IVP given for pain. 1551: Repeat BMP sent per Dr. Luz Peters. 1600: Shift reassessment completed, no changes to previous assessment. 1713: Attempted to wean EPI, MAP < 65 so EPI placed back @ 4 mcg's. Pt. Bathed, Pt. Tolerated it well. 1736: Heparin lab work drawn and sent. 1900: Bedside shift report given to Stephanie Willett, RN (oncoming nurse). Precedex infusing @ 1 mcg/kg/hr. EPI infusing @ 4 mcg/min. Fentanyl infusing @ 100 mcg/hr. Heparin infusing @ 16 U/kg/hr. Insulin gtt infusing. Heparin  blood work not resulted yet, Felice Latham RN to call Lab now.

## 2022-11-25 ENCOUNTER — APPOINTMENT (OUTPATIENT)
Dept: GENERAL RADIOLOGY | Age: 42
End: 2022-11-25
Attending: NURSE PRACTITIONER
Payer: COMMERCIAL

## 2022-11-25 LAB
ABO + RH BLD: NORMAL
ADMINISTERED INITIALS, ADMINIT: NORMAL
ALBUMIN SERPL-MCNC: 2.3 G/DL (ref 3.5–5)
ALBUMIN SERPL-MCNC: 2.5 G/DL (ref 3.5–5)
ALBUMIN/GLOB SERPL: 0.7 {RATIO} (ref 1.1–2.2)
ALBUMIN/GLOB SERPL: 0.8 {RATIO} (ref 1.1–2.2)
ALP SERPL-CCNC: 144 U/L (ref 45–117)
ALP SERPL-CCNC: 167 U/L (ref 45–117)
ALT SERPL-CCNC: 114 U/L (ref 12–78)
ALT SERPL-CCNC: 115 U/L (ref 12–78)
ANION GAP SERPL CALC-SCNC: 4 MMOL/L (ref 5–15)
ANION GAP SERPL CALC-SCNC: 4 MMOL/L (ref 5–15)
ANION GAP SERPL CALC-SCNC: 5 MMOL/L (ref 5–15)
APPEARANCE UR: CLEAR
APTT PPP: 64.3 SEC (ref 22.1–31)
AST SERPL-CCNC: 163 U/L (ref 15–37)
AST SERPL-CCNC: 207 U/L (ref 15–37)
BACTERIA URNS QL MICRO: NEGATIVE /HPF
BILIRUB SERPL-MCNC: 0.9 MG/DL (ref 0.2–1)
BILIRUB SERPL-MCNC: 1.3 MG/DL (ref 0.2–1)
BILIRUB UR QL: NEGATIVE
BLD PROD TYP BPU: NORMAL
BLD PROD TYP BPU: NORMAL
BLOOD GROUP ANTIBODIES SERPL: NORMAL
BPU ID: NORMAL
BPU ID: NORMAL
BUN SERPL-MCNC: 29 MG/DL (ref 6–20)
BUN SERPL-MCNC: 30 MG/DL (ref 6–20)
BUN SERPL-MCNC: 31 MG/DL (ref 6–20)
BUN/CREAT SERPL: 23 (ref 12–20)
BUN/CREAT SERPL: 24 (ref 12–20)
BUN/CREAT SERPL: 26 (ref 12–20)
CALCIUM SERPL-MCNC: 7.6 MG/DL (ref 8.5–10.1)
CALCIUM SERPL-MCNC: 8.1 MG/DL (ref 8.5–10.1)
CALCIUM SERPL-MCNC: 8.5 MG/DL (ref 8.5–10.1)
CHLORIDE SERPL-SCNC: 102 MMOL/L (ref 97–108)
CHLORIDE SERPL-SCNC: 102 MMOL/L (ref 97–108)
CHLORIDE SERPL-SCNC: 104 MMOL/L (ref 97–108)
CO2 SERPL-SCNC: 28 MMOL/L (ref 21–32)
COLOR UR: ABNORMAL
CREAT SERPL-MCNC: 1.19 MG/DL (ref 0.7–1.3)
CREAT SERPL-MCNC: 1.23 MG/DL (ref 0.7–1.3)
CREAT SERPL-MCNC: 1.24 MG/DL (ref 0.7–1.3)
CROSSMATCH RESULT,%XM: NORMAL
CROSSMATCH RESULT,%XM: NORMAL
CRP SERPL-MCNC: 25 MG/DL (ref 0–0.6)
D50 ADMINISTERED, D50ADM: 0 ML
D50 ORDER, D50ORD: 0 ML
ECHO EST RA PRESSURE: 10 MMHG
ECHO LA DIAMETER INDEX: 1.31 CM/M2
ECHO LA DIAMETER: 3.1 CM
ECHO LV FRACTIONAL SHORTENING: 19 % (ref 28–44)
ECHO LV INTERNAL DIMENSION DIASTOLE INDEX: 1.99 CM/M2
ECHO LV INTERNAL DIMENSION DIASTOLIC: 4.7 CM (ref 4.2–5.9)
ECHO LV INTERNAL DIMENSION SYSTOLIC INDEX: 1.61 CM/M2
ECHO LV INTERNAL DIMENSION SYSTOLIC: 3.8 CM
ECHO LV IVSD: 1.2 CM (ref 0.6–1)
ECHO LV MASS 2D: 212 G (ref 88–224)
ECHO LV MASS INDEX 2D: 89.8 G/M2 (ref 49–115)
ECHO LV POSTERIOR WALL DIASTOLIC: 1.2 CM (ref 0.6–1)
ECHO LV RELATIVE WALL THICKNESS RATIO: 0.51
ECHO LVOT AREA: 2.5 CM2
ECHO LVOT DIAM: 1.8 CM
ECHO PV MAX VELOCITY: 1.7 M/S
ECHO PV PEAK GRADIENT: 12 MMHG
ECHO RIGHT VENTRICULAR SYSTOLIC PRESSURE (RVSP): 27 MMHG
ECHO TV REGURGITANT MAX VELOCITY: 2.08 M/S
ECHO TV REGURGITANT PEAK GRADIENT: 17 MMHG
EPITH CASTS URNS QL MICRO: ABNORMAL /LPF
ERYTHROCYTE [DISTWIDTH] IN BLOOD BY AUTOMATED COUNT: 14.9 % (ref 11.5–14.5)
GLOBULIN SER CALC-MCNC: 3.2 G/DL (ref 2–4)
GLOBULIN SER CALC-MCNC: 3.4 G/DL (ref 2–4)
GLUCOSE BLD STRIP.AUTO-MCNC: 100 MG/DL (ref 65–117)
GLUCOSE BLD STRIP.AUTO-MCNC: 103 MG/DL (ref 65–117)
GLUCOSE BLD STRIP.AUTO-MCNC: 104 MG/DL (ref 65–117)
GLUCOSE BLD STRIP.AUTO-MCNC: 109 MG/DL (ref 65–117)
GLUCOSE BLD STRIP.AUTO-MCNC: 112 MG/DL (ref 65–117)
GLUCOSE BLD STRIP.AUTO-MCNC: 115 MG/DL (ref 65–117)
GLUCOSE BLD STRIP.AUTO-MCNC: 116 MG/DL (ref 65–117)
GLUCOSE BLD STRIP.AUTO-MCNC: 118 MG/DL (ref 65–117)
GLUCOSE BLD STRIP.AUTO-MCNC: 120 MG/DL (ref 65–117)
GLUCOSE BLD STRIP.AUTO-MCNC: 121 MG/DL (ref 65–117)
GLUCOSE BLD STRIP.AUTO-MCNC: 127 MG/DL (ref 65–117)
GLUCOSE BLD STRIP.AUTO-MCNC: 129 MG/DL (ref 65–117)
GLUCOSE BLD STRIP.AUTO-MCNC: 135 MG/DL (ref 65–117)
GLUCOSE BLD STRIP.AUTO-MCNC: 146 MG/DL (ref 65–117)
GLUCOSE BLD STRIP.AUTO-MCNC: 91 MG/DL (ref 65–117)
GLUCOSE BLD STRIP.AUTO-MCNC: 94 MG/DL (ref 65–117)
GLUCOSE BLD STRIP.AUTO-MCNC: 99 MG/DL (ref 65–117)
GLUCOSE SERPL-MCNC: 118 MG/DL (ref 65–100)
GLUCOSE SERPL-MCNC: 135 MG/DL (ref 65–100)
GLUCOSE SERPL-MCNC: 137 MG/DL (ref 65–100)
GLUCOSE UR STRIP.AUTO-MCNC: NEGATIVE MG/DL
GLUCOSE, GLC: 100 MG/DL
GLUCOSE, GLC: 103 MG/DL
GLUCOSE, GLC: 104 MG/DL
GLUCOSE, GLC: 109 MG/DL
GLUCOSE, GLC: 112 MG/DL
GLUCOSE, GLC: 115 MG/DL
GLUCOSE, GLC: 116 MG/DL
GLUCOSE, GLC: 118 MG/DL
GLUCOSE, GLC: 120 MG/DL
GLUCOSE, GLC: 121 MG/DL
GLUCOSE, GLC: 127 MG/DL
GLUCOSE, GLC: 129 MG/DL
GLUCOSE, GLC: 135 MG/DL
GLUCOSE, GLC: 146 MG/DL
GLUCOSE, GLC: 91 MG/DL
GLUCOSE, GLC: 94 MG/DL
GLUCOSE, GLC: 99 MG/DL
HCT VFR BLD AUTO: 26.2 % (ref 36.6–50.3)
HGB BLD-MCNC: 8.6 G/DL (ref 12.1–17)
HGB UR QL STRIP: ABNORMAL
HIGH TARGET, HITG: 130 MG/DL
HYALINE CASTS URNS QL MICRO: ABNORMAL /LPF (ref 0–2)
INSULIN ADMINSTERED, INSADM: 2.3 UNITS/HOUR
INSULIN ADMINSTERED, INSADM: 2.7 UNITS/HOUR
INSULIN ADMINSTERED, INSADM: 2.8 UNITS/HOUR
INSULIN ADMINSTERED, INSADM: 3.4 UNITS/HOUR
INSULIN ADMINSTERED, INSADM: 3.5 UNITS/HOUR
INSULIN ADMINSTERED, INSADM: 3.8 UNITS/HOUR
INSULIN ADMINSTERED, INSADM: 4.1 UNITS/HOUR
INSULIN ADMINSTERED, INSADM: 4.6 UNITS/HOUR
INSULIN ADMINSTERED, INSADM: 4.7 UNITS/HOUR
INSULIN ADMINSTERED, INSADM: 5 UNITS/HOUR
INSULIN ADMINSTERED, INSADM: 5.2 UNITS/HOUR
INSULIN ADMINSTERED, INSADM: 5.2 UNITS/HOUR
INSULIN ADMINSTERED, INSADM: 5.4 UNITS/HOUR
INSULIN ADMINSTERED, INSADM: 5.6 UNITS/HOUR
INSULIN ADMINSTERED, INSADM: 5.9 UNITS/HOUR
INSULIN ADMINSTERED, INSADM: 6.2 UNITS/HOUR
INSULIN ADMINSTERED, INSADM: 7.2 UNITS/HOUR
INSULIN ADMINSTERED, INSADM: 7.4 UNITS/HOUR
INSULIN ADMINSTERED, INSADM: 7.7 UNITS/HOUR
INSULIN ORDER, INSORD: 2.3 UNITS/HOUR
INSULIN ORDER, INSORD: 2.7 UNITS/HOUR
INSULIN ORDER, INSORD: 2.8 UNITS/HOUR
INSULIN ORDER, INSORD: 3.4 UNITS/HOUR
INSULIN ORDER, INSORD: 3.5 UNITS/HOUR
INSULIN ORDER, INSORD: 3.8 UNITS/HOUR
INSULIN ORDER, INSORD: 4.1 UNITS/HOUR
INSULIN ORDER, INSORD: 4.6 UNITS/HOUR
INSULIN ORDER, INSORD: 4.7 UNITS/HOUR
INSULIN ORDER, INSORD: 5 UNITS/HOUR
INSULIN ORDER, INSORD: 5.2 UNITS/HOUR
INSULIN ORDER, INSORD: 5.2 UNITS/HOUR
INSULIN ORDER, INSORD: 5.4 UNITS/HOUR
INSULIN ORDER, INSORD: 5.6 UNITS/HOUR
INSULIN ORDER, INSORD: 5.9 UNITS/HOUR
INSULIN ORDER, INSORD: 6.2 UNITS/HOUR
INSULIN ORDER, INSORD: 7.2 UNITS/HOUR
INSULIN ORDER, INSORD: 7.4 UNITS/HOUR
INSULIN ORDER, INSORD: 7.7 UNITS/HOUR
KETONES UR QL STRIP.AUTO: NEGATIVE MG/DL
LEUKOCYTE ESTERASE UR QL STRIP.AUTO: NEGATIVE
LOW TARGET, LOT: 95 MG/DL
MAGNESIUM SERPL-MCNC: 2.3 MG/DL (ref 1.6–2.4)
MAGNESIUM SERPL-MCNC: 2.3 MG/DL (ref 1.6–2.4)
MCH RBC QN AUTO: 27.9 PG (ref 26–34)
MCHC RBC AUTO-ENTMCNC: 32.8 G/DL (ref 30–36.5)
MCV RBC AUTO: 85.1 FL (ref 80–99)
MINUTES UNTIL NEXT BG, NBG: 120 MIN
MINUTES UNTIL NEXT BG, NBG: 60 MIN
MULTIPLIER, MUL: 0.07
MULTIPLIER, MUL: 0.08
MULTIPLIER, MUL: 0.09
MULTIPLIER, MUL: 0.11
NITRITE UR QL STRIP.AUTO: NEGATIVE
NRBC # BLD: 0.02 K/UL (ref 0–0.01)
NRBC BLD-RTO: 0.1 PER 100 WBC
ORDER INITIALS, ORDINIT: NORMAL
PH UR STRIP: 6 [PH] (ref 5–8)
PLATELET # BLD AUTO: 123 K/UL (ref 150–400)
PMV BLD AUTO: 10.1 FL (ref 8.9–12.9)
POTASSIUM SERPL-SCNC: 4.7 MMOL/L (ref 3.5–5.1)
POTASSIUM SERPL-SCNC: 4.9 MMOL/L (ref 3.5–5.1)
POTASSIUM SERPL-SCNC: 5.1 MMOL/L (ref 3.5–5.1)
PROCALCITONIN SERPL-MCNC: 0.7 NG/ML
PROT SERPL-MCNC: 5.7 G/DL (ref 6.4–8.2)
PROT SERPL-MCNC: 5.7 G/DL (ref 6.4–8.2)
PROT UR STRIP-MCNC: NEGATIVE MG/DL
RBC # BLD AUTO: 3.08 M/UL (ref 4.1–5.7)
RBC #/AREA URNS HPF: ABNORMAL /HPF (ref 0–5)
SARS-COV-2, XPLCVT: NOT DETECTED
SERVICE CMNT-IMP: ABNORMAL
SERVICE CMNT-IMP: NORMAL
SODIUM SERPL-SCNC: 134 MMOL/L (ref 136–145)
SODIUM SERPL-SCNC: 135 MMOL/L (ref 136–145)
SODIUM SERPL-SCNC: 136 MMOL/L (ref 136–145)
SOURCE, COVRS: NORMAL
SP GR UR REFRACTOMETRY: 1.01
SPECIMEN EXP DATE BLD: NORMAL
STATUS OF UNIT,%ST: NORMAL
STATUS OF UNIT,%ST: NORMAL
THERAPEUTIC RANGE,PTTT: ABNORMAL SECS (ref 58–77)
UFH PPP CHRO-ACNC: 0.48 IU/ML
UFH PPP CHRO-ACNC: 0.59 IU/ML
UNIT DIVISION, %UDIV: 0
UNIT DIVISION, %UDIV: 0
UROBILINOGEN UR QL STRIP.AUTO: 1 EU/DL (ref 0.2–1)
WBC # BLD AUTO: 17.1 K/UL (ref 4.1–11.1)
WBC URNS QL MICRO: ABNORMAL /HPF (ref 0–4)

## 2022-11-25 PROCEDURE — 74011000250 HC RX REV CODE- 250: Performed by: NURSE PRACTITIONER

## 2022-11-25 PROCEDURE — 74011000258 HC RX REV CODE- 258: Performed by: INTERNAL MEDICINE

## 2022-11-25 PROCEDURE — 87070 CULTURE OTHR SPECIMN AEROBIC: CPT

## 2022-11-25 PROCEDURE — 81001 URINALYSIS AUTO W/SCOPE: CPT

## 2022-11-25 PROCEDURE — 74011250636 HC RX REV CODE- 250/636: Performed by: PHYSICIAN ASSISTANT

## 2022-11-25 PROCEDURE — 74011250637 HC RX REV CODE- 250/637: Performed by: THORACIC SURGERY (CARDIOTHORACIC VASCULAR SURGERY)

## 2022-11-25 PROCEDURE — 74011250636 HC RX REV CODE- 250/636: Performed by: NURSE PRACTITIONER

## 2022-11-25 PROCEDURE — 74011000250 HC RX REV CODE- 250: Performed by: PHYSICIAN ASSISTANT

## 2022-11-25 PROCEDURE — 87040 BLOOD CULTURE FOR BACTERIA: CPT

## 2022-11-25 PROCEDURE — 85520 HEPARIN ASSAY: CPT

## 2022-11-25 PROCEDURE — 74011000250 HC RX REV CODE- 250: Performed by: THORACIC SURGERY (CARDIOTHORACIC VASCULAR SURGERY)

## 2022-11-25 PROCEDURE — 94003 VENT MGMT INPAT SUBQ DAY: CPT

## 2022-11-25 PROCEDURE — 74011250636 HC RX REV CODE- 250/636: Performed by: INTERNAL MEDICINE

## 2022-11-25 PROCEDURE — 74011000258 HC RX REV CODE- 258: Performed by: NURSE PRACTITIONER

## 2022-11-25 PROCEDURE — 82962 GLUCOSE BLOOD TEST: CPT

## 2022-11-25 PROCEDURE — 86900 BLOOD TYPING SEROLOGIC ABO: CPT

## 2022-11-25 PROCEDURE — 86140 C-REACTIVE PROTEIN: CPT

## 2022-11-25 PROCEDURE — 85027 COMPLETE CBC AUTOMATED: CPT

## 2022-11-25 PROCEDURE — 83735 ASSAY OF MAGNESIUM: CPT

## 2022-11-25 PROCEDURE — 74011000250 HC RX REV CODE- 250: Performed by: ANESTHESIOLOGY

## 2022-11-25 PROCEDURE — 80053 COMPREHEN METABOLIC PANEL: CPT

## 2022-11-25 PROCEDURE — 85730 THROMBOPLASTIN TIME PARTIAL: CPT

## 2022-11-25 PROCEDURE — 74011000250 HC RX REV CODE- 250: Performed by: INTERNAL MEDICINE

## 2022-11-25 PROCEDURE — 71045 X-RAY EXAM CHEST 1 VIEW: CPT

## 2022-11-25 PROCEDURE — P9047 ALBUMIN (HUMAN), 25%, 50ML: HCPCS | Performed by: PHYSICIAN ASSISTANT

## 2022-11-25 PROCEDURE — 65610000006 HC RM INTENSIVE CARE

## 2022-11-25 PROCEDURE — 86923 COMPATIBILITY TEST ELECTRIC: CPT

## 2022-11-25 PROCEDURE — 74011250637 HC RX REV CODE- 250/637: Performed by: PHYSICIAN ASSISTANT

## 2022-11-25 PROCEDURE — 36415 COLL VENOUS BLD VENIPUNCTURE: CPT

## 2022-11-25 PROCEDURE — 74011250636 HC RX REV CODE- 250/636: Performed by: THORACIC SURGERY (CARDIOTHORACIC VASCULAR SURGERY)

## 2022-11-25 PROCEDURE — 84145 PROCALCITONIN (PCT): CPT

## 2022-11-25 PROCEDURE — 93005 ELECTROCARDIOGRAM TRACING: CPT

## 2022-11-25 RX ORDER — FUROSEMIDE 10 MG/ML
60 INJECTION INTRAMUSCULAR; INTRAVENOUS ONCE
Status: COMPLETED | OUTPATIENT
Start: 2022-11-25 | End: 2022-11-25

## 2022-11-25 RX ORDER — FUROSEMIDE 10 MG/ML
60 INJECTION INTRAMUSCULAR; INTRAVENOUS EVERY 8 HOURS
Status: DISCONTINUED | OUTPATIENT
Start: 2022-11-25 | End: 2022-11-25

## 2022-11-25 RX ORDER — ALBUMIN HUMAN 250 G/1000ML
12.5 SOLUTION INTRAVENOUS EVERY 6 HOURS
Status: COMPLETED | OUTPATIENT
Start: 2022-11-25 | End: 2022-11-26

## 2022-11-25 RX ORDER — METRONIDAZOLE 500 MG/100ML
500 INJECTION, SOLUTION INTRAVENOUS EVERY 12 HOURS
Status: DISCONTINUED | OUTPATIENT
Start: 2022-11-25 | End: 2022-11-28

## 2022-11-25 RX ORDER — ALBUMIN HUMAN 250 G/1000ML
12.5 SOLUTION INTRAVENOUS EVERY 8 HOURS
Status: DISCONTINUED | OUTPATIENT
Start: 2022-11-25 | End: 2022-11-25

## 2022-11-25 RX ADMIN — MAGNESIUM OXIDE TAB 400 MG (241.3 MG ELEMENTAL MG) 400 MG: 400 (241.3 MG) TAB at 17:47

## 2022-11-25 RX ADMIN — CEFEPIME 2 G: 2 INJECTION, POWDER, FOR SOLUTION INTRAVENOUS at 17:34

## 2022-11-25 RX ADMIN — SODIUM CHLORIDE 5 MG/HR: 900 INJECTION, SOLUTION INTRAVENOUS at 00:11

## 2022-11-25 RX ADMIN — VANCOMYCIN HYDROCHLORIDE 2500 MG: 10 INJECTION, POWDER, LYOPHILIZED, FOR SOLUTION INTRAVENOUS at 11:09

## 2022-11-25 RX ADMIN — HYDROMORPHONE HYDROCHLORIDE 1 MG: 1 INJECTION, SOLUTION INTRAMUSCULAR; INTRAVENOUS; SUBCUTANEOUS at 06:13

## 2022-11-25 RX ADMIN — CEFEPIME 2 G: 2 INJECTION, POWDER, FOR SOLUTION INTRAVENOUS at 10:55

## 2022-11-25 RX ADMIN — BUSPIRONE HYDROCHLORIDE 15 MG: 5 TABLET ORAL at 15:25

## 2022-11-25 RX ADMIN — Medication 1 MCG/KG/HR: at 20:58

## 2022-11-25 RX ADMIN — ACETAMINOPHEN 500 MG: 500 TABLET ORAL at 06:14

## 2022-11-25 RX ADMIN — PROPOFOL 5 MCG/KG/MIN: 10 INJECTION, EMULSION INTRAVENOUS at 20:13

## 2022-11-25 RX ADMIN — EPINEPHRINE 4 MCG/MIN: 1 INJECTION INTRAMUSCULAR; INTRAVENOUS; SUBCUTANEOUS at 02:55

## 2022-11-25 RX ADMIN — METRONIDAZOLE 500 MG: 500 INJECTION, SOLUTION INTRAVENOUS at 13:58

## 2022-11-25 RX ADMIN — SERTRALINE 100 MG: 50 TABLET, FILM COATED ORAL at 08:52

## 2022-11-25 RX ADMIN — ACETAMINOPHEN 500 MG: 500 TABLET ORAL at 13:10

## 2022-11-25 RX ADMIN — FENTANYL CITRATE 100 MCG/HR: 50 INJECTION INTRAVENOUS at 12:51

## 2022-11-25 RX ADMIN — CHLORHEXIDINE GLUCONATE 0.12% ORAL RINSE 10 ML: 1.2 LIQUID ORAL at 08:51

## 2022-11-25 RX ADMIN — BUSPIRONE HYDROCHLORIDE 15 MG: 5 TABLET ORAL at 08:54

## 2022-11-25 RX ADMIN — ALBUMIN HUMAN 12.5 G: 0.25 SOLUTION INTRAVENOUS at 14:59

## 2022-11-25 RX ADMIN — Medication 1 MCG/KG/HR: at 10:56

## 2022-11-25 RX ADMIN — SODIUM CHLORIDE, PRESERVATIVE FREE 10 ML: 5 INJECTION INTRAVENOUS at 21:15

## 2022-11-25 RX ADMIN — Medication 1 MCG/KG/HR: at 14:22

## 2022-11-25 RX ADMIN — ROSUVASTATIN CALCIUM 40 MG: 40 TABLET, FILM COATED ORAL at 08:57

## 2022-11-25 RX ADMIN — MUPIROCIN: 20 OINTMENT TOPICAL at 08:56

## 2022-11-25 RX ADMIN — LEVOTHYROXINE SODIUM 125 MCG: 0.12 TABLET ORAL at 06:05

## 2022-11-25 RX ADMIN — PROPOFOL 20 MCG/KG/MIN: 10 INJECTION, EMULSION INTRAVENOUS at 12:27

## 2022-11-25 RX ADMIN — ALBUMIN HUMAN 12.5 G: 0.25 SOLUTION INTRAVENOUS at 17:47

## 2022-11-25 RX ADMIN — HYDROMORPHONE HYDROCHLORIDE 1 MG: 1 INJECTION, SOLUTION INTRAMUSCULAR; INTRAVENOUS; SUBCUTANEOUS at 18:38

## 2022-11-25 RX ADMIN — FUROSEMIDE 60 MG: 10 INJECTION, SOLUTION INTRAMUSCULAR; INTRAVENOUS at 08:54

## 2022-11-25 RX ADMIN — HEPARIN SODIUM 18 UNITS/KG/HR: 10000 INJECTION, SOLUTION INTRAVENOUS at 11:15

## 2022-11-25 RX ADMIN — METRONIDAZOLE 500 MG: 500 INJECTION, SOLUTION INTRAVENOUS at 22:21

## 2022-11-25 RX ADMIN — MUPIROCIN: 20 OINTMENT TOPICAL at 17:48

## 2022-11-25 RX ADMIN — HEPARIN SODIUM 18 UNITS/KG/HR: 10000 INJECTION, SOLUTION INTRAVENOUS at 23:26

## 2022-11-25 RX ADMIN — FUROSEMIDE 60 MG: 10 INJECTION, SOLUTION INTRAMUSCULAR; INTRAVENOUS at 21:11

## 2022-11-25 RX ADMIN — Medication 1 MCG/KG/HR: at 06:05

## 2022-11-25 RX ADMIN — SODIUM CHLORIDE 10 ML/HR: 4.5 INJECTION, SOLUTION INTRAVENOUS at 14:01

## 2022-11-25 RX ADMIN — BUSPIRONE HYDROCHLORIDE 15 MG: 5 TABLET ORAL at 21:11

## 2022-11-25 RX ADMIN — ASPIRIN 81 MG CHEWABLE TABLET 81 MG: 81 TABLET CHEWABLE at 08:56

## 2022-11-25 RX ADMIN — SODIUM CHLORIDE, PRESERVATIVE FREE 10 ML: 5 INJECTION INTRAVENOUS at 06:05

## 2022-11-25 RX ADMIN — ACETAMINOPHEN 500 MG: 500 TABLET ORAL at 21:11

## 2022-11-25 RX ADMIN — PROPOFOL 20 MCG/KG/MIN: 10 INJECTION, EMULSION INTRAVENOUS at 07:10

## 2022-11-25 RX ADMIN — SODIUM CHLORIDE, PRESERVATIVE FREE 10 ML: 5 INJECTION INTRAVENOUS at 14:27

## 2022-11-25 RX ADMIN — EPOPROSTENOL 30 NG/KG/MIN: 1.5 INJECTION, POWDER, LYOPHILIZED, FOR SOLUTION INTRAVENOUS at 01:30

## 2022-11-25 RX ADMIN — EPOPROSTENOL 30 NG/KG/MIN: 1.5 INJECTION, POWDER, LYOPHILIZED, FOR SOLUTION INTRAVENOUS at 11:05

## 2022-11-25 RX ADMIN — PANTOPRAZOLE SODIUM 40 MG: 40 TABLET, DELAYED RELEASE ORAL at 08:54

## 2022-11-25 RX ADMIN — MAGNESIUM OXIDE TAB 400 MG (241.3 MG ELEMENTAL MG) 400 MG: 400 (241.3 MG) TAB at 08:52

## 2022-11-25 RX ADMIN — SENNOSIDES AND DOCUSATE SODIUM 1 TABLET: 50; 8.6 TABLET ORAL at 08:53

## 2022-11-25 RX ADMIN — AMIODARONE HYDROCHLORIDE 400 MG: 200 TABLET ORAL at 08:52

## 2022-11-25 RX ADMIN — Medication 1 MCG/KG/HR: at 01:50

## 2022-11-25 RX ADMIN — CHLORHEXIDINE GLUCONATE 0.12% ORAL RINSE 10 ML: 1.2 LIQUID ORAL at 17:48

## 2022-11-25 NOTE — PROGRESS NOTES
Nutrition Note    Consult received to start trickle TF per Cardiothoracic Surgery. Pt was re-intubated early 11/24, remains on some pressor support and sedated with propofol at 14. 1mL/hr providing 393 kcals/day. MAP >65. Start TwoCal at 15mL/hr + FWF 50mL Q6hr. When cleared to advance, increase rate 10mL Q6hr to goal rate 35mL/hr + Prosource x7 packs per day. Will provide 147g Pro, 1960kcals, 184g CHO, 788mL water. Will meet 100% protein and 75% kcals, appropriate to permissively underfeed for BMI 40.5.     Estimated needs:  PSU 2646 kcals (MSJ 2131)  146g Pro (2.0g/kg IBW)    Electronically signed by Marcial Sloan RD on 11/25/2022 at 12:03 PM    Contact: [id number]

## 2022-11-25 NOTE — PROGRESS NOTES
1900 Bedside shift change report given to Tika MCINTOSH RN (oncoming nurse) by Lauire Santillan RN (offgoing nurse). Report included the following information SBAR, Kardex, ED Summary, OR Summary, Procedure Summary, Intake/Output, MAR, Accordion, Recent Results, Med Rec Status, Cardiac Rhythm NSR, and Alarm Parameters . 2000 Assessment cmplete. Detil asessment in flowsheet. 2027 Pharmacist Lorena Ji agreed to changes on Heparin drip to 18 units/kg/hr and give bolus of 5000 units max. 0000 Assessment cmplete. Detil asessment in flowsheet. 0230 Anti-Xa is 0.59, therapeutic number per protocol. 0400 Assessment cmplete. Detil asessment in flowsheet    0630 Patient started to desat again, called RRT/NP. NP planned to paralyzed patient but we need to sedate first. Propofol started at 20 mcg/kg/min.    0700 Bedside report given to Agnieszka/KG Wilde.

## 2022-11-25 NOTE — PROGRESS NOTES
PT note:     Chart reviewed and spoke with nursing. Noted patient re-intubated on 11/24/22, currently on 80% FiO2. Patient also sedated and not medically stable for PT services. Will defer and continue to follow.      General Overlie, PT, DPT

## 2022-11-25 NOTE — PROGRESS NOTES
Pharmacy Automatic Renal Dosing Protocol - Antimicrobials    Indication for Antimicrobials: sepsis     Current Regimen of Each Antimicrobial:   Cefepime 2g IV q8h ; started ; day 1  Vancomycin - pharmacy to dose; started ; day 1  Metronidazole 500 mg IV q12h; started ; day 1     Previous Antimicrobial Therapy:   Periop cefazolin and vanc     Goal Level: VANCOMYCIN TROUGH GOAL RANGE    Vancomycin Trough: 15 - 20 mcg/mL  (AUC: 400 - 600 mg/hr/Liter/day)     Date Dose & Interval Measured (mcg/mL) Extrapolated (mcg/mL)                       Significant Cultures:    blood cx pending     Paralysis, amputations, malnutrition: -    Labs:  Recent Labs     22  0201 22  1551 22  0405 22  0403 22  0321   CREA 1.23 1.60* 1.79*  --  1.82*   BUN 30* 34* 34*  --  34*   WBC 17.1*  --   --  18.6* 16.4*     Temp (24hrs), Av.3 °F (38.5 °C), Min:100.4 °F (38 °C), Max:102.2 °F (39 °C)    Creatinine Clearance (mL/min) or Dialysis: 77.4 mL/min (IBW)    Impression/Plan:   Scr trending down  Vancomycin 2500 mg loading dose  Anticipated dose 1000 mg IV q18h but will monitor levels early due to recovering SERGE  Antimicrobial stop date pending     Pharmacy will follow daily and adjust medications as appropriate for renal function and/or serum levels.     Thank you,  ANGELICA Ochoa

## 2022-11-25 NOTE — PROGRESS NOTES
NAME: Ruby Brian        :  1980        MRN:  [id number]                     Assessment   :                                               Plan:  SERGE  hyperkalemia  DM  PVD  CAD  S/P CABGX1  S/P AVR  Hypotension    SERGE  likely due to ATN from hypotension. UA with protein and blood. He has a pennington. Hold on renal imaging. Fair  UO. He appears to be volume overloaded. There is no acute need for dialysis. Creatinine better. OK to diurese as needed. Potassium better. WYATT RN    Patient is critically ill and at significant risk of deterioration. I will sign off. Please call if any questions. Subjective:     Chief Complaint:  Intubated. Sedated. Review of Systems:    Symptom Y/N Comments  Symptom Y/N Comments   Fever/Chills    Chest Pain     Poor Appetite    Edema     Cough    Abdominal Pain     Sputum    Joint Pain     SOB/COLE    Pruritis/Rash     Nausea/vomit    Tolerating PT/OT     Diarrhea    Tolerating Diet     Constipation    Other       Could not obtain due to:      Objective:     VITALS:   Last 24hrs VS reviewed since prior progress note.  Most recent are:  Visit Vitals  BP (!) 115/58   Pulse 84   Temp (!) 101.9 °F (38.8 °C)   Resp 20   Ht 5' 9\" (1.753 m)   Wt 124.5 kg (274 lb 7.6 oz)   SpO2 97%   BMI 40.53 kg/m²       Intake/Output Summary (Last 24 hours) at 2022 1021  Last data filed at 2022 0900  Gross per 24 hour   Intake 3131.08 ml   Output 2520 ml   Net 611.08 ml        Telemetry Reviewed:     PHYSICAL EXAM:  General: NAD  1+ edema      Lab Data Reviewed: (see below)    Medications Reviewed: (see below)    PMH/SH reviewed - no change compared to H&P  ________________________________________________________________________  Care Plan discussed with:  Patient     Family      RN     Care Manager                    Consultant:          Comments   >50% of visit spent in counseling and coordination of care       ________________________________________________________________________  Lou Michelle MD     Procedures: see electronic medical records for all procedures/Xrays and details which  were not copied into this note but were reviewed prior to creation of Plan. LABS:  Recent Labs     11/25/22  0201 11/24/22  0403   WBC 17.1* 18.6*   HGB 8.6* 9.1*   HCT 26.2* 28.7*   * 113*       Recent Labs     11/25/22  0201 11/24/22  1551 11/24/22  0405 11/23/22  0456 11/23/22  0249   * 135* 134*   < > 140   K 5.1 5.0 5.1   < > 5.6*    102 103   < > 110*   CO2 28 28 27   < > 23   BUN 30* 34* 34*   < > 26*   CREA 1.23 1.60* 1.79*   < > 1.94*   * 144* 124*   < > 132*   CA 8.5 8.4* 8.6   < > 9.0   MG 2.3  --  2.2  --  2.4    < > = values in this interval not displayed. Recent Labs     11/25/22  0201 11/24/22  0405 11/24/22  0321   * 111 110   TP 5.7* 5.8* 5.8*   ALB 2.5* 2.9* 3.0*   GLOB 3.2 2.9 2.8       Recent Labs     11/25/22  0811 11/24/22  0403 11/22/22  1513   INR  --   --  1.1   PTP  --   --  11.2*   APTT 64.3* 26.0 26.4        No results for input(s): FE, TIBC, PSAT, FERR in the last 72 hours. No results found for: FOL, RBCF   Recent Labs     11/24/22  1445 11/24/22  0626   PH 7.39 7.32*   PCO2 43 50*   PO2 199* 66*       No results for input(s): CPK, CKMB in the last 72 hours.     No lab exists for component: TROPONINI  No components found for: [place]  Lab Results   Component Value Date/Time    Color DARK YELLOW 11/23/2022 02:37 PM    Appearance TURBID (A) 11/23/2022 02:37 PM    Specific gravity 1.026 11/23/2022 02:37 PM    Specific gravity 1.010 11/16/2022 12:27 PM    pH (UA) 5.0 11/23/2022 02:37 PM    Protein 100 (A) 11/23/2022 02:37 PM    Glucose Negative 11/23/2022 02:37 PM    Ketone TRACE (A) 11/23/2022 02:37 PM    Bilirubin Negative 11/16/2022 12:27 PM    Urobilinogen 1.0 11/23/2022 02:37 PM    Nitrites Negative 11/23/2022 02:37 PM    Leukocyte Esterase TRACE (A) 11/23/2022 02:37 PM    Epithelial cells MODERATE (A) 11/23/2022 02:37 PM    Bacteria 3+ (A) 11/23/2022 02:37 PM    WBC 10-20 11/23/2022 02:37 PM    RBC  11/23/2022 02:37 PM       MEDICATIONS:  Current Facility-Administered Medications   Medication Dose Route Frequency    furosemide (LASIX) injection 60 mg  60 mg IntraVENous Q8H    vancomycin (VANCOCIN) 2500 mg in  mL infusion  2,500 mg IntraVENous ONCE    cefepime (MAXIPIME) 2 g in 0.9% sodium chloride 10 mL IV syringe  2 g IntraVENous NOW    Followed by    cefepime (MAXIPIME) 2 g in 0.9% sodium chloride (MBP/ADV) 100 mL MBP  2 g IntraVENous Q8H    metroNIDAZOLE (FLAGYL) IVPB premix 500 mg  500 mg IntraVENous Q12H    dexmedeTOMidine in 0.9 % NaCl (PRECEDEX) 400 mcg/100 mL (4 mcg/mL) infusion soln  0.1-1.5 mcg/kg/hr IntraVENous TITRATE    fentaNYL (PF) 1,500 mcg/30 mL (50 mcg/mL) infusion  0-200 mcg/hr IntraVENous TITRATE    epoprostenol (VELETRI) 30 mcg/mL in 0.9% sodium chloride 50 mL inhalation solution  30 ng/kg/min (Ideal) Inhalation CONTINUOUS    propofol (DIPRIVAN) 10 mg/mL infusion  0-50 mcg/kg/min IntraVENous TITRATE    heparin 25,000 units in D5W 250 ml infusion  16-36 Units/kg/hr IntraVENous TITRATE    heparin (porcine) 1,000 unit/mL injection 4,980 Units  40 Units/kg IntraVENous PRN    Or    heparin (porcine) 1,000 unit/mL injection 9,960 Units  80 Units/kg IntraVENous PRN    0.9% sodium chloride infusion  50 mL/hr IntraVENous CONTINUOUS    HYDROcodone-acetaminophen (NORCO) 5-325 mg per tablet 1 Tablet  1 Tablet Oral Q4H PRN    HYDROcodone-acetaminophen (NORCO) 7.5-325 mg per tablet 1 Tablet  1 Tablet Oral Q4H PRN    HYDROmorphone (DILAUDID) injection 1 mg  1 mg IntraVENous Q4H PRN    HYDROmorphone (DILAUDID) injection 0.5 mg  0.5 mg IntraVENous Q4H PRN    acetaminophen (TYLENOL) tablet 500 mg  500 mg Oral Q6H PRN    niCARdipine (CARDENE) 25 mg in 0.9% sodium chloride 250 mL ([id number])  5-15 mg/hr IntraVENous TITRATE    sodium chloride (NS) flush 5-40 mL  5-40 mL IntraVENous Q8H    sodium chloride (NS) flush 5-40 mL  5-40 mL IntraVENous PRN    ondansetron (ZOFRAN) injection 4 mg  4 mg IntraVENous PRN    bacitracin 500 unit/gram packet 1 Packet  1 Packet Topical PRN    0.45% sodium chloride infusion  10 mL/hr IntraVENous CONTINUOUS    naloxone (NARCAN) injection 0.4 mg  0.4 mg IntraVENous PRN    mupirocin (BACTROBAN) 2 % ointment   Both Nostrils BID    amiodarone (CORDARONE) tablet 400 mg  400 mg Oral BID    ondansetron (ZOFRAN) injection 4 mg  4 mg IntraVENous Q4H PRN    albuterol (PROVENTIL VENTOLIN) nebulizer solution 2.5 mg  2.5 mg Nebulization Q4H PRN    aspirin chewable tablet 81 mg  81 mg Oral DAILY    midazolam (VERSED) injection 1 mg  1 mg IntraVENous Q1H PRN    chlorhexidine (PERIDEX) 0.12 % mouthwash 10 mL  10 mL Oral BID    magnesium oxide (MAG-OX) tablet 400 mg  400 mg Oral BID    calcium chloride 1 g in 0.9% sodium chloride 250 mL IVPB  1 g IntraVENous PRN    polyethylene glycol (MIRALAX) packet 17 g  17 g Oral DAILY    senna-docusate (PERICOLACE) 8.6-50 mg per tablet 1 Tablet  1 Tablet Oral DAILY    ELECTROLYTE REPLACEMENT NOTE: Nurse to review Serum Potassium and Magnesuim levels and Initiate Electrolyte Replacement Protocol as needed  1 Each Other PRN    magnesium sulfate 1 g/100 ml IVPB (premix or compounded)  1 g IntraVENous PRN    glucose chewable tablet 16 g  4 Tablet Oral PRN    glucagon (GLUCAGEN) injection 1 mg  1 mg IntraMUSCular PRN    insulin lispro (HUMALOG) injection   SubCUTAneous AC&HS    insulin glargine (LANTUS) injection 1-50 Units  1-50 Units SubCUTAneous ONCE OVER 24 HOURS    lactated ringers bolus infusion 250 mL  250 mL IntraVENous Q1H PRN    dextrose 10 % infusion 0-250 mL  0-250 mL IntraVENous PRN    melatonin tablet 3-6 mg  3-6 mg Oral QHS PRN    EPINEPHrine (ADRENALIN) 5 mg in 0.9% sodium chloride 250 mL infusion  0-10 mcg/min IntraVENous TITRATE    lidocaine 4 % patch 2 Patch  2 Patch TransDERmal Q24H    insulin regular (NOVOLIN R, HUMULIN R) 100 Units in 0.9% sodium chloride 100 mL infusion  1-25 Units/hr IntraVENous TITRATE    levothyroxine (SYNTHROID) tablet 125 mcg  125 mcg Oral 6am    pantoprazole (PROTONIX) tablet 40 mg  40 mg Oral ACB    LORazepam (ATIVAN) tablet 1 mg  1 mg Oral Q8H PRN    rosuvastatin (CRESTOR) tablet 40 mg  40 mg Oral DAILY    sertraline (ZOLOFT) tablet 100 mg  100 mg Oral DAILY    busPIRone (BUSPAR) tablet 15 mg  15 mg Oral TID

## 2022-11-25 NOTE — PROGRESS NOTES
0715: Bedside shift report received from [place], [address]: Interdisciplinary team rounds were held 11/25/2022 with the following team members:Care Management, Nursing, Pharmacy, and Physician and the patient. Plan of care discussed. See clinical pathway and/or care plan for interventions and desired outcomes. Goals of the Day: Nutrition, SAT/SBT, wean sedation    4431-7211: BP dropped to SBP 70's suddenly, rhythm change noted on monitor, appearing to be junctional although HR maintained 70-80 bpm. Attempted to obtain EKG but unable to hook up before patient converted NSR. Upon return to NSR 's. Epi decreased to 3 mcg.    5573-5623: CI down to 1.9, Epi increased to 3.5. Claudia AMBROCIO at bedside. Updated on recent events. PO amio held. Pacer remains on VVI backup. 1310: Tylenol given for pulmonary artery temp 101.9.     1445: Spoke with Claudia AMBROCIO regarding update on pt condition. Patient diuresed >700 cc, PAP 26/13, CVP 9, SVR 2000, CI down to 1.3. Labs sent. Order received to hold Lasix and give 25% albumin. 1611: Spoke to Tuyet AMBROCIO, updated on hemodynamics. CI up to 2.4. BP stable. UO tapering down. FI02 down to 70%. O2 sat 97%. Order received for 25% albumin Q6 X 24 hrs and lasix 60 mg at 2000. Will reassess pt in AM. Also notified of pt bleeding from L most lloyd site, dressing changed and pinpoint pressuyre held X15 min. Bleeding ceased and new dressing applied. 1638: Propofol decreasd to 10 mcg, pt barely responding to pain. .     1723: Propofol decreased to 5 mcg. 3648-9362: Pt turned and bed pad changed. Pt eyes opened and nodding appropriately. BP increased drastically, 's-190's. Prop increased to 50 mcg. BP reached 220's/90's. Dr. Roberto Blackwood notified and PRN Dilaudid 1mg given. BP began slowly decreasing after several minutes. Epi restarted and Prop increased to 10 mcg. 1930: Bedside shift report given to Modesta Price RN.

## 2022-11-25 NOTE — PROGRESS NOTES
Occupational Therapy    Chart reviewed and spoke with nursing. Noted patient re-intubated on 11/24/22, currently on 80% FiO2. Patient also sedated and not medically stable for OT services. Will defer and continue to follow.      De Reza, OTR/L

## 2022-11-25 NOTE — PROGRESS NOTES
Critical Care Progress Note  Sanam Christian MD          Date of Service:  2022  NAME:  Hipolito De La Cruz  :  1980  MRN:  [id number]      Subjective/Hospital course:      35 y/o male POD 1 from CABG x1 an AV valve replacement. He arrived intubated and on pressors. Extubated  on POD 0 @ 19:00. Noted to have SERGE with creatinine rise 2. Possibly secondary to hypotension in OR. Night of  was worked up for hypoxemia. This morning he tested positive for Covid. Case discussed with cT surgery. Most likely diagnosis is PE most likely secondary to hypercoaguable state from Covid. Pt had no respiratory symptoms preop or post op suggestive of URI      - Overnight developed hypoxemia. Pt had bronch and SYLWIA neither of which explained cause for hypoxemia. He required re-intubation and was put on veletri.  - intermittent desaturation events.   CXR this a.m. with pulmonary edema    Problem list:         Assessment/Plan:     PULMONARY:  Acute hypoxic resopiratory failure   Covid rapid +, PCR negative  D dimer / LE doppler negative - unlikely PE  Pulmonary edema/ pleural effusions    Plan  - recommend to stop heparin - defer to CTS  - Vent settings established, reviewed and/or adjusted as per orders  - Continue nebulized bronchodilators and steroids  - Supplemental O2 to maintain SpO2 92-97%  - Daily SBT when meets criteria  - Wean veletri      CARDIOVASCULAR/HEMODYNAMIC:  Volume overload  Suspect some degree of RV failue  Unable to visualize on echo but PA pressures elevated on monitor   >7L positive for hospital stay         Current vasopressors: Epi 2     Plan  - Add lasix TID  - ICU hemodynamic/cardiac monitoring  - MAP goal > 65 mmHg        RENAL:  SERGE - resolved       Plan  - Monitor I/Os and Uo  - Monitor renal function panel intermittently  - Correct electrolytes as needed  - Avoid nephrotoxic meds    Current RRT:       GI/NUTRITION:    Plan  - start trickle feeds   Do not advance while on epi        INFECTIOUS DISEASE  Covid? Micro:    Plan  - start broad spectrum abx until PNA ruled out  - pan culture  - covid PCR    NEURO  Sedation for veny synchrony    Plan  - add propofol  - Daily SAT when meets ICU criteria  - ABCDEF mobility bundle  - PT/OT involvement when appropriate          Care Plan discussed with: Nurse and     I personally spent 45 minutes of critical care time. This is time spent at this critically ill patient's bedside actively involved in patient care as well as the coordination of care and discussions with the patient's family. This does not include any procedural time which has been billed separately. Review of Systems:   ROS   Intubated     Vital Signs:   Patient Vitals for the past 4 hrs:   BP Temp Pulse Resp SpO2   11/25/22 0730 -- -- 79 20 91 %   11/25/22 0715 -- -- 83 20 (!) 87 %   11/25/22 0700 (!) 108/54 -- 76 20 (!) 87 %   11/25/22 0600 112/61 (!) 101 °F (38.3 °C) 90 20 97 %   11/25/22 0558 -- -- 90 20 97 %   11/25/22 0500 (!) 116/59 (!) 100.9 °F (38.3 °C) 83 20 97 %   11/25/22 0400 118/70 (!) 101 °F (38.3 °C) 88 20 98 %        Intake/Output Summary (Last 24 hours) at 11/25/2022 0746  Last data filed at 11/25/2022 0600  Gross per 24 hour   Intake 3983.1 ml   Output 3410 ml   Net 573.1 ml        Physical Examination:    Physical Exam  Constitutional:       Appearance: He is ill-appearing. Interventions: He is intubated. Comments: Intubated sedated   HENT:      Head: Normocephalic. Mouth/Throat:      Mouth: Mucous membranes are moist.   Eyes:      Pupils: Pupils are equal, round, and reactive to light. Pulmonary:      Effort: He is intubated. Breath sounds: Decreased air movement present. No wheezing or rhonchi. Abdominal:      General: There is distension. Palpations: Abdomen is soft. Musculoskeletal:      Right lower leg: Edema present. Left lower leg: Edema present.        Labs and Imaging: Reviewed.       Medications:     Current Facility-Administered Medications   Medication Dose Route Frequency    furosemide (LASIX) injection 60 mg  60 mg IntraVENous Q8H    dexmedeTOMidine in 0.9 % NaCl (PRECEDEX) 400 mcg/100 mL (4 mcg/mL) infusion soln  0.1-1.5 mcg/kg/hr IntraVENous TITRATE    fentaNYL (PF) 1,500 mcg/30 mL (50 mcg/mL) infusion  0-200 mcg/hr IntraVENous TITRATE    epoprostenol (VELETRI) 30 mcg/mL in 0.9% sodium chloride 50 mL inhalation solution  30 ng/kg/min (Ideal) Inhalation CONTINUOUS    propofol (DIPRIVAN) 10 mg/mL infusion  0-50 mcg/kg/min IntraVENous TITRATE    heparin 25,000 units in D5W 250 ml infusion  16-36 Units/kg/hr IntraVENous TITRATE    heparin (porcine) 1,000 unit/mL injection 4,980 Units  40 Units/kg IntraVENous PRN    Or    heparin (porcine) 1,000 unit/mL injection 9,960 Units  80 Units/kg IntraVENous PRN    0.9% sodium chloride infusion  50 mL/hr IntraVENous CONTINUOUS    HYDROcodone-acetaminophen (NORCO) 5-325 mg per tablet 1 Tablet  1 Tablet Oral Q4H PRN    HYDROcodone-acetaminophen (NORCO) 7.5-325 mg per tablet 1 Tablet  1 Tablet Oral Q4H PRN    HYDROmorphone (DILAUDID) injection 1 mg  1 mg IntraVENous Q4H PRN    HYDROmorphone (DILAUDID) injection 0.5 mg  0.5 mg IntraVENous Q4H PRN    acetaminophen (TYLENOL) tablet 500 mg  500 mg Oral Q6H PRN    niCARdipine (CARDENE) 25 mg in 0.9% sodium chloride 250 mL ([id number])  5-15 mg/hr IntraVENous TITRATE    sodium chloride (NS) flush 5-40 mL  5-40 mL IntraVENous Q8H    sodium chloride (NS) flush 5-40 mL  5-40 mL IntraVENous PRN    ondansetron (ZOFRAN) injection 4 mg  4 mg IntraVENous PRN    bacitracin 500 unit/gram packet 1 Packet  1 Packet Topical PRN    0.45% sodium chloride infusion  10 mL/hr IntraVENous CONTINUOUS    naloxone (NARCAN) injection 0.4 mg  0.4 mg IntraVENous PRN    mupirocin (BACTROBAN) 2 % ointment   Both Nostrils BID    amiodarone (CORDARONE) tablet 400 mg  400 mg Oral BID    ondansetron (ZOFRAN) injection 4 mg  4 mg IntraVENous Q4H PRN    albuterol (PROVENTIL VENTOLIN) nebulizer solution 2.5 mg  2.5 mg Nebulization Q4H PRN    aspirin chewable tablet 81 mg  81 mg Oral DAILY    midazolam (VERSED) injection 1 mg  1 mg IntraVENous Q1H PRN    chlorhexidine (PERIDEX) 0.12 % mouthwash 10 mL  10 mL Oral BID    magnesium oxide (MAG-OX) tablet 400 mg  400 mg Oral BID    calcium chloride 1 g in 0.9% sodium chloride 250 mL IVPB  1 g IntraVENous PRN    polyethylene glycol (MIRALAX) packet 17 g  17 g Oral DAILY    senna-docusate (PERICOLACE) 8.6-50 mg per tablet 1 Tablet  1 Tablet Oral DAILY    ELECTROLYTE REPLACEMENT NOTE: Nurse to review Serum Potassium and Magnesuim levels and Initiate Electrolyte Replacement Protocol as needed  1 Each Other PRN    magnesium sulfate 1 g/100 ml IVPB (premix or compounded)  1 g IntraVENous PRN    glucose chewable tablet 16 g  4 Tablet Oral PRN    glucagon (GLUCAGEN) injection 1 mg  1 mg IntraMUSCular PRN    insulin lispro (HUMALOG) injection   SubCUTAneous AC&HS    insulin glargine (LANTUS) injection 1-50 Units  1-50 Units SubCUTAneous ONCE OVER 24 HOURS    lactated ringers bolus infusion 250 mL  250 mL IntraVENous Q1H PRN    dextrose 10 % infusion 0-250 mL  0-250 mL IntraVENous PRN    melatonin tablet 3-6 mg  3-6 mg Oral QHS PRN    EPINEPHrine (ADRENALIN) 5 mg in 0.9% sodium chloride 250 mL infusion  0-10 mcg/min IntraVENous TITRATE    lidocaine 4 % patch 2 Patch  2 Patch TransDERmal Q24H    insulin regular (NOVOLIN R, HUMULIN R) 100 Units in 0.9% sodium chloride 100 mL infusion  1-25 Units/hr IntraVENous TITRATE    levothyroxine (SYNTHROID) tablet 125 mcg  125 mcg Oral 6am    pantoprazole (PROTONIX) tablet 40 mg  40 mg Oral ACB    LORazepam (ATIVAN) tablet 1 mg  1 mg Oral Q8H PRN    rosuvastatin (CRESTOR) tablet 40 mg  40 mg Oral DAILY    sertraline (ZOLOFT) tablet 100 mg  100 mg Oral DAILY    busPIRone (BUSPAR) tablet 15 mg  15 mg Oral TID ______________________________________________________________________  EXPECTED LENGTH OF STAY: 1d 19h  ACTUAL LENGTH OF STAY:          Jim Greene MD   Pulmonary/[facility]  [phone]

## 2022-11-25 NOTE — PROGRESS NOTES
[facility] ICU Progress Note    Admit Date: 11/15/2022  POD:  3 Day Post-Op    Procedure:  Procedure(s):  SYLWIA BY DR Ronda Roblero -  CORONARY ARTERY BYPASS GRAFT  X 1 WITH LEFT INTERNAL MAMMARY ARTERY GRAFTING - AORTIC VALVE REPLACEMENT WITH MEYER 25MM INSPIRIS RESILIA TISSUE VALVE AND REPAIR OF ASCENDING AORTIC ANEURYSM        Subjective/Interval:   Pt seen with Dr. Tarah Carpenter. Patient remains intubated on on maximum support with epoprostenol. CXR this am with increasing pulmonary edema. CT output 210cc/24, no air leak. Tm over the last 24 hours 102. 2. He is on Epi @ 4, Mv02 58%, CO/CI 5.2/2.3. Creatinine is improving 1.23 this am, good UOP 2840cc/24. COVID PCR yesterday was negative, repeat is pending today. Objective:   Vitals:  Blood pressure (!) 115/58, pulse 84, temperature (!) 101.9 °F (38.8 °C), resp. rate 20, height 5' 9\" (1.753 m), weight 274 lb 7.6 oz (124.5 kg), SpO2 97 %.   Temp (24hrs), Av.3 °F (38.5 °C), Min:100.4 °F (38 °C), Max:102.2 °F (39 °C)      Hemodynamics:   CO: CO (l/min): 5.2 l/min   CI: CI (l/min/m2): 2.3 l/min/m2   CVP: CVP (mmHg): 19 mmHg (22 0900)   SVR: SVR (dyne*sec)/cm5: 954 (dyne*sec)/cm5 (22 5494)   PAP Systolic: PAP Systolic: 39 (92/17/56 8567)   PAP Diastolic: PAP Diastolic: 25 (73/89/73 2133)   PVR:     SV02: SVO2 (%): 58 % (22 0900)   SCV02:      EKG/Rhythm:    EKG Results       Procedure 720 Value Units Date/Time    EKG, 12 LEAD, INITIAL [[id number]]     Order Status: Sent     EKG, 12 LEAD, INITIAL [[id number]] Collected: 22    Order Status: Completed Updated: 22 1017     Ventricular Rate 77 BPM      Atrial Rate 77 BPM      P-R Interval 164 ms      QRS Duration 82 ms      Q-T Interval 370 ms      QTC Calculation (Bezet) 418 ms      Calculated P Axis 49 degrees      Calculated R Axis -6 degrees      Calculated T Axis 108 degrees      Diagnosis --     Normal sinus rhythm  Minimal voltage criteria for LVH, may be normal variant ( R in aVL )  Nonspecific ST and T wave abnormality  Poor R-wave Progression (consider lead placement or loss of anterior forces)    Confirmed by [facility] Sidra MENENDEZ ([id number]) on 11/18/2022 10:17:13 AM      EKG, 12 LEAD, INITIAL [[id number]] Collected: 11/15/22 1728    Order Status: Completed Updated: 11/16/22 1136     Ventricular Rate 83 BPM      Atrial Rate 83 BPM      P-R Interval 160 ms      QRS Duration 84 ms      Q-T Interval 358 ms      QTC Calculation (Bezet) 420 ms      Calculated P Axis 50 degrees      Calculated R Axis 3 degrees      Calculated T Axis 126 degrees      Diagnosis --     Normal sinus rhythm  Left ventricular hypertrophy with repolarization abnormality  When compared with ECG of 14-SEP-2022 12:27,  No significant change was found  Confirmed by Jennifer Tinoco ([id number]) on 11/16/2022 11:35:53 AM              CT Output: 210 in 24 hrs    Ventilator:  Ventilator Volumes  Vt Set (ml): 450 ml (11/25/22 0902)  Vt Exhaled (Machine Breath) (ml): 480 ml (11/25/22 0902)  Vt Spont (ml): 455 ml (11/23/22 2349)  Ve Observed (l/min): 9.5 l/min (11/25/22 0902)        Oxygen Therapy:  Oxygen Therapy  O2 Sat (%): 97 % (11/25/22 0902)  Pulse via Oximetry: 84 beats per minute (11/25/22 0900)  O2 Device: Endotracheal tube;Ventilator (11/25/22 0800)  Skin Assessment: Clean, dry, & intact (11/25/22 0902)  Skin Protection for O2 Device: N/A (11/25/22 0000)  O2 Flow Rate (L/min): 6 l/min (11/24/22 1600)  FIO2 (%): 100 % (11/25/22 0902)    CXR:    CXR Results  (Last 48 hours)                 11/25/22 0835  XR CHEST PORT Final result    Impression:  Worsening pulmonary edema and pleural effusions. Narrative:  INDICATION: hypoxia       EXAMINATION:  AP CHEST, PORTABLE       COMPARISON: 11/24/2022       FINDINGS: Single AP portable view of the chest demonstrates interval placement   of a nasogastric tube which extends to the left upper quadrant the tip not   included on the study.  Remaining life support lines and tubes are unchanged. The   cardiomediastinal silhouette is unchanged. Increasing pleural effusions and mild   pulmonary edema. 11/24/22 0331  XR CHEST PORT Final result    Impression:      Satisfactory endotracheal tube placement. Stable minimal pulmonary edema. Narrative:  EXAM:  XR CHEST PORT       INDICATION: Intubated       COMPARISON: 11/24/2022 at 0144 hours       TECHNIQUE: Portable AP semiupright chest view at 0320 hours       FINDINGS: The endotracheal tube terminates above the [place]. The remaining   support devices are stable. The cardiomediastinal contours are stable. There are stable mild diffuse interstitial opacities. There is no pleural   effusion or pneumothorax. The bones and upper abdomen are stable. 11/24/22 0148  XR CHEST PORT Final result    Impression:      Stable support devices. Decreased minimal pulmonary edema. Narrative:  EXAM:  XR CHEST PORT       INDICATION: Desaturation       COMPARISON: 11/23/2022 at 0426 hours       TECHNIQUE: Portable AP upright chest view at 0144 hours       FINDINGS: The right IJ pulmonary artery catheter, mediastinal drain, and left   chest tube are stable. The cardiomediastinal contours are stable. There are decreased minimal diffuse interstitial opacities. There is no pleural   effusion or pneumothorax. The bones and upper abdomen are stable. Admission Weight: Last Weight   Weight: 257 lb 15 oz (117 kg) Weight: 274 lb 7.6 oz (124.5 kg)     Intake / Output / Drain:  Current Shift: 11/25 0701 - 11/25 1900  In: 369.8 [I.V.:369.8]  Out: 225 [Urine:225]  Last 24 hrs.:   Intake/Output Summary (Last 24 hours) at 11/25/2022 1029  Last data filed at 11/25/2022 0900  Gross per 24 hour   Intake 3131.08 ml   Output 2520 ml   Net 611.08 ml         EXAM:  General: Intubated, sedated.     Lungs:    Coarse to auscultation bilaterally   Incision:  Incision clean, dry and intact and prineo intact   Heart:   Regular rate and rhythm  and no murmurs, rubs or gallops   Abdomen:    Distended, hypoactive bowel sounds, and obese   Extremities:  +1 pitting edema BLE, BUE   Neurologic:  Intubated and sedated. Labs:   Recent Labs     11/25/22  0849 11/25/22  0223 11/25/22  0201 11/22/22  1615 11/22/22  1513   WBC  --   --  17.1*   < >  --    HGB  --   --  8.6*   < > 11.8*   HCT  --   --  26.2*   < > 34.5*   PLT  --   --  123*   < >  --    NA  --   --  134*   < >  --    K  --   --  5.1   < >  --    BUN  --   --  30*   < >  --    CREA  --   --  1.23   < >  --    GLU  --   --  135*   < >  --    GLUCPOC 129*   < >  --    < >  --    INR  --   --   --   --  1.1    < > = values in this interval not displayed. Assessment:     Active Problems:    Acute non-Q wave non-ST elevation myocardial infarction (NSTEMI) ([address] (11/15/2022)      Aortic stenosis (11/22/2022)      CAD (coronary artery disease) (11/22/2022)      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)      Overview: On pump CORONARY ARTERY BYPASS GRAFT  X 1 WITH LEFT INTERNAL MAMMARY       ARTERY to LAD      AORTIC VALVE REPLACEMENT WITH MEYER 25MM INSPIRIS RESILIA TISSUE VALVE       REPAIR OF ASCENDING AORTIC ANEURYSM with 26mm hemashield graft      S/P AVR (aortic valve replacement) and aortoplasty (11/22/2022)      Overview: On pump CORONARY ARTERY BYPASS GRAFT  X 1 WITH LEFT INTERNAL MAMMARY       ARTERY to LAD      AORTIC VALVE REPLACEMENT WITH MEYER 25MM INSPIRIS RESILIA TISSUE VALVE       REPAIR OF ASCENDING AORTIC ANEURYSM with 26mm hemashield graft       Plan/Recommendations/Medical Decision Making:     Severe symptomatic AS, s/p tissue AVR. Continue ASA. CAD, STEMI, s/p CABG. Continue ASA, amio, statin. Consider BB when off pressors. Wean Epi for MAP greater than 75. Acute hypoxic respiratory failure: Patient developed hypoxia at approximately 11pm on 11/24 requiring Bipap and was ultimately re-intubated at 0200 for acute respiratory failure.  Bronchoscopy was completed 11/24 and was unremarkable. SYLWIA was completed 11/24 with normal RV and LVF without effusion or shunting. CTA was no completed due to SERGE. Will continue heparin gtt for possible PE, may consider CT for PE once patient is more stable. May consider steroids and antiviral for COVID. BLE US (-) for DVT. Continue Epoprostenol per [facility], Lasix 60 mg IV TID today. Acute kidney injury. Creatinine 2.01>1.79>1.23. Nephrology consult appreciated, avoid nephrotoxins, trend. Acute blood loss anemia: H&H 8.6/26.2. Monitor. Transaminitis. Avoid hepatotoxins, trend. Leukocytosis. WBC 20.6>18.6>17.1. febrile last 36 hours, tm 102.2 last 24, now on Flagyl; vanco, and cefepime, BC, Sputum and urine cultures pending. HTN. On ACEI, BB PTA; resume BB when appropriate. HLD. Continue statin. WALLY , not on CPAP. Was unable to tolerate due to anxiety; he has been working on this with Sleep Medicine. OP follow-up. DMII. On Toujeo at home. Repeat A1C 9.0- Insulin gtt per protocol. Diabetes management following. Hypothyroidism. On Synthroid PTA; continue. Repeat TSH 1.96. GERD. Continue PPI. Anxiety and depression. On Buspar, Zoloft; continue. Supportive care. Nutrition: Start trickle TF today. DVT ppx- SCDs, heparin.   Dispo- ICU today       Signed By: Camille Flores PA-C

## 2022-11-26 ENCOUNTER — APPOINTMENT (OUTPATIENT)
Dept: GENERAL RADIOLOGY | Age: 42
End: 2022-11-26
Attending: INTERNAL MEDICINE
Payer: COMMERCIAL

## 2022-11-26 LAB
ADMINISTERED INITIALS, ADMINIT: NORMAL
ALBUMIN SERPL-MCNC: 2.7 G/DL (ref 3.5–5)
ALBUMIN/GLOB SERPL: 0.8 {RATIO} (ref 1.1–2.2)
ALP SERPL-CCNC: 162 U/L (ref 45–117)
ALT SERPL-CCNC: 104 U/L (ref 12–78)
ANION GAP SERPL CALC-SCNC: 6 MMOL/L (ref 5–15)
AST SERPL-CCNC: 132 U/L (ref 15–37)
BASOPHILS # BLD: 0 K/UL (ref 0–0.1)
BASOPHILS NFR BLD: 0 % (ref 0–1)
BILIRUB SERPL-MCNC: 1.2 MG/DL (ref 0.2–1)
BUN SERPL-MCNC: 36 MG/DL (ref 6–20)
BUN/CREAT SERPL: 27 (ref 12–20)
CALCIUM SERPL-MCNC: 8.1 MG/DL (ref 8.5–10.1)
CHLORIDE SERPL-SCNC: 102 MMOL/L (ref 97–108)
CO2 SERPL-SCNC: 28 MMOL/L (ref 21–32)
CREAT SERPL-MCNC: 1.33 MG/DL (ref 0.7–1.3)
D50 ADMINISTERED, D50ADM: 0 ML
D50 ORDER, D50ORD: 0 ML
DATE LAST DOSE: NORMAL
DIFFERENTIAL METHOD BLD: ABNORMAL
EOSINOPHIL # BLD: 0.1 K/UL (ref 0–0.4)
EOSINOPHIL NFR BLD: 1 % (ref 0–7)
ERYTHROCYTE [DISTWIDTH] IN BLOOD BY AUTOMATED COUNT: 14.7 % (ref 11.5–14.5)
GLOBULIN SER CALC-MCNC: 3.4 G/DL (ref 2–4)
GLUCOSE BLD STRIP.AUTO-MCNC: 102 MG/DL (ref 65–117)
GLUCOSE BLD STRIP.AUTO-MCNC: 106 MG/DL (ref 65–117)
GLUCOSE BLD STRIP.AUTO-MCNC: 108 MG/DL (ref 65–117)
GLUCOSE BLD STRIP.AUTO-MCNC: 109 MG/DL (ref 65–117)
GLUCOSE BLD STRIP.AUTO-MCNC: 113 MG/DL (ref 65–117)
GLUCOSE BLD STRIP.AUTO-MCNC: 115 MG/DL (ref 65–117)
GLUCOSE BLD STRIP.AUTO-MCNC: 115 MG/DL (ref 65–117)
GLUCOSE BLD STRIP.AUTO-MCNC: 116 MG/DL (ref 65–117)
GLUCOSE BLD STRIP.AUTO-MCNC: 117 MG/DL (ref 65–117)
GLUCOSE BLD STRIP.AUTO-MCNC: 118 MG/DL (ref 65–117)
GLUCOSE BLD STRIP.AUTO-MCNC: 121 MG/DL (ref 65–117)
GLUCOSE BLD STRIP.AUTO-MCNC: 121 MG/DL (ref 65–117)
GLUCOSE BLD STRIP.AUTO-MCNC: 123 MG/DL (ref 65–117)
GLUCOSE BLD STRIP.AUTO-MCNC: 137 MG/DL (ref 65–117)
GLUCOSE BLD STRIP.AUTO-MCNC: 142 MG/DL (ref 65–117)
GLUCOSE BLD STRIP.AUTO-MCNC: 145 MG/DL (ref 65–117)
GLUCOSE BLD STRIP.AUTO-MCNC: 146 MG/DL (ref 65–117)
GLUCOSE BLD STRIP.AUTO-MCNC: 92 MG/DL (ref 65–117)
GLUCOSE SERPL-MCNC: 149 MG/DL (ref 65–100)
GLUCOSE, GLC: 102 MG/DL
GLUCOSE, GLC: 106 MG/DL
GLUCOSE, GLC: 108 MG/DL
GLUCOSE, GLC: 109 MG/DL
GLUCOSE, GLC: 113 MG/DL
GLUCOSE, GLC: 115 MG/DL
GLUCOSE, GLC: 115 MG/DL
GLUCOSE, GLC: 116 MG/DL
GLUCOSE, GLC: 117 MG/DL
GLUCOSE, GLC: 118 MG/DL
GLUCOSE, GLC: 121 MG/DL
GLUCOSE, GLC: 121 MG/DL
GLUCOSE, GLC: 123 MG/DL
GLUCOSE, GLC: 137 MG/DL
GLUCOSE, GLC: 142 MG/DL
GLUCOSE, GLC: 145 MG/DL
GLUCOSE, GLC: 146 MG/DL
GLUCOSE, GLC: 92 MG/DL
HCT VFR BLD AUTO: 23.1 % (ref 36.6–50.3)
HGB BLD-MCNC: 7.5 G/DL (ref 12.1–17)
HIGH TARGET, HITG: 130 MG/DL
IMM GRANULOCYTES # BLD AUTO: 0 K/UL (ref 0–0.04)
IMM GRANULOCYTES NFR BLD AUTO: 0 % (ref 0–0.5)
INSULIN ADMINSTERED, INSADM: 2.5 UNITS/HOUR
INSULIN ADMINSTERED, INSADM: 4.2 UNITS/HOUR
INSULIN ADMINSTERED, INSADM: 4.3 UNITS/HOUR
INSULIN ADMINSTERED, INSADM: 4.4 UNITS/HOUR
INSULIN ADMINSTERED, INSADM: 4.6 UNITS/HOUR
INSULIN ADMINSTERED, INSADM: 4.7 UNITS/HOUR
INSULIN ADMINSTERED, INSADM: 5.4 UNITS/HOUR
INSULIN ADMINSTERED, INSADM: 5.5 UNITS/HOUR
INSULIN ADMINSTERED, INSADM: 5.6 UNITS/HOUR
INSULIN ADMINSTERED, INSADM: 5.7 UNITS/HOUR
INSULIN ADMINSTERED, INSADM: 6 UNITS/HOUR
INSULIN ADMINSTERED, INSADM: 6.2 UNITS/HOUR
INSULIN ADMINSTERED, INSADM: 7.3 UNITS/HOUR
INSULIN ADMINSTERED, INSADM: 7.6 UNITS/HOUR
INSULIN ADMINSTERED, INSADM: 7.6 UNITS/HOUR
INSULIN ADMINSTERED, INSADM: 8.5 UNITS/HOUR
INSULIN ORDER, INSORD: 2.5 UNITS/HOUR
INSULIN ORDER, INSORD: 4.2 UNITS/HOUR
INSULIN ORDER, INSORD: 4.3 UNITS/HOUR
INSULIN ORDER, INSORD: 4.4 UNITS/HOUR
INSULIN ORDER, INSORD: 4.6 UNITS/HOUR
INSULIN ORDER, INSORD: 4.7 UNITS/HOUR
INSULIN ORDER, INSORD: 5.4 UNITS/HOUR
INSULIN ORDER, INSORD: 5.5 UNITS/HOUR
INSULIN ORDER, INSORD: 5.6 UNITS/HOUR
INSULIN ORDER, INSORD: 5.7 UNITS/HOUR
INSULIN ORDER, INSORD: 6 UNITS/HOUR
INSULIN ORDER, INSORD: 6.2 UNITS/HOUR
INSULIN ORDER, INSORD: 7.3 UNITS/HOUR
INSULIN ORDER, INSORD: 7.6 UNITS/HOUR
INSULIN ORDER, INSORD: 7.6 UNITS/HOUR
INSULIN ORDER, INSORD: 8.5 UNITS/HOUR
LOW TARGET, LOT: 95 MG/DL
LYMPHOCYTES # BLD: 1.8 K/UL (ref 0.8–3.5)
LYMPHOCYTES NFR BLD: 15 % (ref 12–49)
MAGNESIUM SERPL-MCNC: 2.4 MG/DL (ref 1.6–2.4)
MCH RBC QN AUTO: 27.8 PG (ref 26–34)
MCHC RBC AUTO-ENTMCNC: 32.5 G/DL (ref 30–36.5)
MCV RBC AUTO: 85.6 FL (ref 80–99)
MINUTES UNTIL NEXT BG, NBG: 120 MIN
MINUTES UNTIL NEXT BG, NBG: 60 MIN
MONOCYTES # BLD: 1.1 K/UL (ref 0–1)
MONOCYTES NFR BLD: 9 % (ref 5–13)
MULTIPLIER, MUL: 0.08
MULTIPLIER, MUL: 0.09
MULTIPLIER, MUL: 0.1
NEUTS BAND NFR BLD MANUAL: 1 %
NEUTS SEG # BLD: 8.7 K/UL (ref 1.8–8)
NEUTS SEG NFR BLD: 74 % (ref 32–75)
NRBC # BLD: 0.02 K/UL (ref 0–0.01)
NRBC BLD-RTO: 0.2 PER 100 WBC
ORDER INITIALS, ORDINIT: NORMAL
PLATELET # BLD AUTO: 120 K/UL (ref 150–400)
PMV BLD AUTO: 9.8 FL (ref 8.9–12.9)
POTASSIUM SERPL-SCNC: 4.9 MMOL/L (ref 3.5–5.1)
PROT SERPL-MCNC: 6.1 G/DL (ref 6.4–8.2)
RBC # BLD AUTO: 2.7 M/UL (ref 4.1–5.7)
RBC MORPH BLD: ABNORMAL
REPORTED DOSE,DOSE: NORMAL UNITS
REPORTED DOSE/TIME,TMG: NORMAL
SERVICE CMNT-IMP: ABNORMAL
SERVICE CMNT-IMP: NORMAL
SODIUM SERPL-SCNC: 136 MMOL/L (ref 136–145)
UFH PPP CHRO-ACNC: 0.36 IU/ML
VANCOMYCIN TROUGH SERPL-MCNC: 8 UG/ML (ref 5–10)
WBC # BLD AUTO: 11.7 K/UL (ref 4.1–11.1)

## 2022-11-26 PROCEDURE — 74011000250 HC RX REV CODE- 250: Performed by: NURSE PRACTITIONER

## 2022-11-26 PROCEDURE — 74011000258 HC RX REV CODE- 258: Performed by: NURSE PRACTITIONER

## 2022-11-26 PROCEDURE — 65610000006 HC RM INTENSIVE CARE

## 2022-11-26 PROCEDURE — 80053 COMPREHEN METABOLIC PANEL: CPT

## 2022-11-26 PROCEDURE — 74011000258 HC RX REV CODE- 258: Performed by: INTERNAL MEDICINE

## 2022-11-26 PROCEDURE — P9047 ALBUMIN (HUMAN), 25%, 50ML: HCPCS | Performed by: PHYSICIAN ASSISTANT

## 2022-11-26 PROCEDURE — 85520 HEPARIN ASSAY: CPT

## 2022-11-26 PROCEDURE — 74011250636 HC RX REV CODE- 250/636: Performed by: PHYSICIAN ASSISTANT

## 2022-11-26 PROCEDURE — 74011250636 HC RX REV CODE- 250/636: Performed by: NURSE PRACTITIONER

## 2022-11-26 PROCEDURE — 83735 ASSAY OF MAGNESIUM: CPT

## 2022-11-26 PROCEDURE — 36415 COLL VENOUS BLD VENIPUNCTURE: CPT

## 2022-11-26 PROCEDURE — 71045 X-RAY EXAM CHEST 1 VIEW: CPT

## 2022-11-26 PROCEDURE — 74011250636 HC RX REV CODE- 250/636: Performed by: INTERNAL MEDICINE

## 2022-11-26 PROCEDURE — 74011000250 HC RX REV CODE- 250: Performed by: ANESTHESIOLOGY

## 2022-11-26 PROCEDURE — 82962 GLUCOSE BLOOD TEST: CPT

## 2022-11-26 PROCEDURE — 74011000250 HC RX REV CODE- 250: Performed by: THORACIC SURGERY (CARDIOTHORACIC VASCULAR SURGERY)

## 2022-11-26 PROCEDURE — 74011250637 HC RX REV CODE- 250/637: Performed by: PHYSICIAN ASSISTANT

## 2022-11-26 PROCEDURE — 74011250637 HC RX REV CODE- 250/637: Performed by: THORACIC SURGERY (CARDIOTHORACIC VASCULAR SURGERY)

## 2022-11-26 PROCEDURE — 80202 ASSAY OF VANCOMYCIN: CPT

## 2022-11-26 PROCEDURE — 94003 VENT MGMT INPAT SUBQ DAY: CPT

## 2022-11-26 PROCEDURE — 85025 COMPLETE CBC W/AUTO DIFF WBC: CPT

## 2022-11-26 RX ORDER — FUROSEMIDE 10 MG/ML
40 INJECTION INTRAMUSCULAR; INTRAVENOUS DAILY
Status: DISCONTINUED | OUTPATIENT
Start: 2022-11-26 | End: 2022-11-26

## 2022-11-26 RX ORDER — NITROGLYCERIN 20 MG/100ML
0-200 INJECTION INTRAVENOUS
Status: DISCONTINUED | OUTPATIENT
Start: 2022-11-26 | End: 2022-11-30

## 2022-11-26 RX ORDER — ENOXAPARIN SODIUM 100 MG/ML
30 INJECTION SUBCUTANEOUS EVERY 12 HOURS
Status: DISCONTINUED | OUTPATIENT
Start: 2022-11-26 | End: 2022-11-27 | Stop reason: ALTCHOICE

## 2022-11-26 RX ORDER — FUROSEMIDE 10 MG/ML
40 INJECTION INTRAMUSCULAR; INTRAVENOUS EVERY 8 HOURS
Status: DISCONTINUED | OUTPATIENT
Start: 2022-11-26 | End: 2022-11-27

## 2022-11-26 RX ADMIN — SODIUM CHLORIDE 6.2 UNITS/HR: 9 INJECTION, SOLUTION INTRAVENOUS at 16:07

## 2022-11-26 RX ADMIN — METRONIDAZOLE 500 MG: 500 INJECTION, SOLUTION INTRAVENOUS at 09:21

## 2022-11-26 RX ADMIN — MAGNESIUM OXIDE TAB 400 MG (241.3 MG ELEMENTAL MG) 400 MG: 400 (241.3 MG) TAB at 09:20

## 2022-11-26 RX ADMIN — POLYETHYLENE GLYCOL 3350 17 G: 17 POWDER, FOR SOLUTION ORAL at 09:20

## 2022-11-26 RX ADMIN — ENOXAPARIN SODIUM 30 MG: 100 INJECTION SUBCUTANEOUS at 21:51

## 2022-11-26 RX ADMIN — SODIUM CHLORIDE 2.5 UNITS/HR: 9 INJECTION, SOLUTION INTRAVENOUS at 11:36

## 2022-11-26 RX ADMIN — Medication 1 MCG/KG/HR: at 12:22

## 2022-11-26 RX ADMIN — ACETAMINOPHEN 500 MG: 500 TABLET ORAL at 03:09

## 2022-11-26 RX ADMIN — Medication 1 MCG/KG/HR: at 05:22

## 2022-11-26 RX ADMIN — PROPOFOL 10 MCG/KG/MIN: 10 INJECTION, EMULSION INTRAVENOUS at 23:56

## 2022-11-26 RX ADMIN — SODIUM CHLORIDE 6 UNITS/HR: 9 INJECTION, SOLUTION INTRAVENOUS at 18:20

## 2022-11-26 RX ADMIN — MAGNESIUM OXIDE TAB 400 MG (241.3 MG ELEMENTAL MG) 400 MG: 400 (241.3 MG) TAB at 17:23

## 2022-11-26 RX ADMIN — SODIUM CHLORIDE 5.4 UNITS/HR: 9 INJECTION, SOLUTION INTRAVENOUS at 17:18

## 2022-11-26 RX ADMIN — FENTANYL CITRATE 100 MCG/HR: 50 INJECTION INTRAVENOUS at 18:15

## 2022-11-26 RX ADMIN — Medication 1 MCG/KG/HR: at 09:22

## 2022-11-26 RX ADMIN — FUROSEMIDE 40 MG: 10 INJECTION, SOLUTION INTRAMUSCULAR; INTRAVENOUS at 11:21

## 2022-11-26 RX ADMIN — LEVOTHYROXINE SODIUM 125 MCG: 0.12 TABLET ORAL at 06:31

## 2022-11-26 RX ADMIN — SODIUM CHLORIDE, PRESERVATIVE FREE 10 ML: 5 INJECTION INTRAVENOUS at 05:20

## 2022-11-26 RX ADMIN — MUPIROCIN: 20 OINTMENT TOPICAL at 17:24

## 2022-11-26 RX ADMIN — ROSUVASTATIN CALCIUM 40 MG: 40 TABLET, FILM COATED ORAL at 09:20

## 2022-11-26 RX ADMIN — CEFEPIME 2 G: 2 INJECTION, POWDER, FOR SOLUTION INTRAVENOUS at 17:01

## 2022-11-26 RX ADMIN — Medication 1 MCG/KG/HR: at 04:24

## 2022-11-26 RX ADMIN — FENTANYL CITRATE 100 MCG/HR: 50 INJECTION INTRAVENOUS at 03:09

## 2022-11-26 RX ADMIN — CEFEPIME 2 G: 2 INJECTION, POWDER, FOR SOLUTION INTRAVENOUS at 09:20

## 2022-11-26 RX ADMIN — BUSPIRONE HYDROCHLORIDE 15 MG: 5 TABLET ORAL at 15:08

## 2022-11-26 RX ADMIN — ALBUMIN HUMAN 12.5 G: 0.25 SOLUTION INTRAVENOUS at 11:22

## 2022-11-26 RX ADMIN — BUSPIRONE HYDROCHLORIDE 15 MG: 5 TABLET ORAL at 22:05

## 2022-11-26 RX ADMIN — SODIUM CHLORIDE, PRESERVATIVE FREE 10 ML: 5 INJECTION INTRAVENOUS at 21:58

## 2022-11-26 RX ADMIN — SERTRALINE 100 MG: 50 TABLET, FILM COATED ORAL at 09:20

## 2022-11-26 RX ADMIN — SENNOSIDES AND DOCUSATE SODIUM 1 TABLET: 50; 8.6 TABLET ORAL at 09:20

## 2022-11-26 RX ADMIN — SODIUM CHLORIDE 8.5 UNITS/HR: 9 INJECTION, SOLUTION INTRAVENOUS at 13:50

## 2022-11-26 RX ADMIN — BUSPIRONE HYDROCHLORIDE 15 MG: 5 TABLET ORAL at 09:20

## 2022-11-26 RX ADMIN — Medication 1 MCG/KG/HR: at 19:34

## 2022-11-26 RX ADMIN — ALBUMIN HUMAN 12.5 G: 0.25 SOLUTION INTRAVENOUS at 05:25

## 2022-11-26 RX ADMIN — CHLORHEXIDINE GLUCONATE 0.12% ORAL RINSE 10 ML: 1.2 LIQUID ORAL at 17:24

## 2022-11-26 RX ADMIN — FUROSEMIDE 40 MG: 10 INJECTION, SOLUTION INTRAMUSCULAR; INTRAVENOUS at 21:58

## 2022-11-26 RX ADMIN — ACETAMINOPHEN 500 MG: 500 TABLET ORAL at 18:46

## 2022-11-26 RX ADMIN — ASPIRIN 81 MG CHEWABLE TABLET 81 MG: 81 TABLET CHEWABLE at 09:20

## 2022-11-26 RX ADMIN — CHLORHEXIDINE GLUCONATE 0.12% ORAL RINSE 10 ML: 1.2 LIQUID ORAL at 09:20

## 2022-11-26 RX ADMIN — METRONIDAZOLE 500 MG: 500 INJECTION, SOLUTION INTRAVENOUS at 21:56

## 2022-11-26 RX ADMIN — Medication 1 MCG/KG/HR: at 22:49

## 2022-11-26 RX ADMIN — Medication 1 MCG/KG/HR: at 01:48

## 2022-11-26 RX ADMIN — ALBUMIN HUMAN 12.5 G: 0.25 SOLUTION INTRAVENOUS at 00:44

## 2022-11-26 RX ADMIN — MUPIROCIN: 20 OINTMENT TOPICAL at 09:20

## 2022-11-26 RX ADMIN — SODIUM CHLORIDE, PRESERVATIVE FREE 10 ML: 5 INJECTION INTRAVENOUS at 15:13

## 2022-11-26 RX ADMIN — SODIUM CHLORIDE 7.6 UNITS/HR: 9 INJECTION, SOLUTION INTRAVENOUS at 07:57

## 2022-11-26 RX ADMIN — SODIUM CHLORIDE 5.6 UNITS/HR: 9 INJECTION, SOLUTION INTRAVENOUS at 10:28

## 2022-11-26 RX ADMIN — VANCOMYCIN HYDROCHLORIDE 1000 MG: 1 INJECTION, POWDER, LYOPHILIZED, FOR SOLUTION INTRAVENOUS at 17:13

## 2022-11-26 RX ADMIN — EPOPROSTENOL 20 NG/KG/MIN: 1.5 INJECTION, POWDER, LYOPHILIZED, FOR SOLUTION INTRAVENOUS at 04:41

## 2022-11-26 RX ADMIN — VANCOMYCIN HYDROCHLORIDE 1000 MG: 1 INJECTION, POWDER, LYOPHILIZED, FOR SOLUTION INTRAVENOUS at 05:25

## 2022-11-26 RX ADMIN — CEFEPIME 2 G: 2 INJECTION, POWDER, FOR SOLUTION INTRAVENOUS at 02:41

## 2022-11-26 RX ADMIN — PANTOPRAZOLE SODIUM 40 MG: 40 TABLET, DELAYED RELEASE ORAL at 09:20

## 2022-11-26 NOTE — PROGRESS NOTES
2230 Bedside report received from Leopold Fritz, [facility]    0000 Assessment complete. Detail assessment in flowsheet. 0400 Assessment complete. Detail assessment in flowsheet. 1364 Temp went up to 39.1 C. Patient put on cooling blanket.

## 2022-11-26 NOTE — PROGRESS NOTES
[facility] ICU Progress Note    Admit Date: 11/15/2022  POD:  4 Day Post-Op    Procedure:  Procedure(s):  SYLWIA BY DR Yuliana Mejia -  CORONARY ARTERY BYPASS GRAFT  X 1 WITH LEFT INTERNAL MAMMARY ARTERY GRAFTING - AORTIC VALVE REPLACEMENT WITH MEYER 25MM INSPIRIS RESILIA TISSUE VALVE AND REPAIR OF ASCENDING AORTIC ANEURYSM        Subjective/Interval:   Pt seen with Dr. Shyla Moya. Patient remains intubated, epoprostenol now off. Now on 60% FiO2 and 12 of PEEP. CXR this am with decreasing pulmonary edema. CT output 70cc/24, no air leak. Tm over the last 24 hours 102.4. He is on Epi @ 2, Mv02 52%, CO/CI 5.1/2.2. Creatinine is improving 1.23 this am, good UOP 3565cc/24. COVID PCR yesterday was negative. Objective:   Vitals:  Blood pressure (!) 177/96, pulse 86, temperature (!) 102.4 °F (39.1 °C), resp. rate 20, height 5' 9\" (1.753 m), weight 274 lb 7.6 oz (124.5 kg), SpO2 97 %.   Temp (24hrs), Av.8 °F (38.8 °C), Min:101.5 °F (38.6 °C), Max:102.4 °F (39.1 °C)      Hemodynamics:   CO: CO (l/min): 5.1 l/min   CI: CI (l/min/m2): 2.2 l/min/m2   CVP: CVP (mmHg): 18 mmHg (22 0500)   SVR: SVR (dyne*sec)/cm5: 863 (dyne*sec)/cm5 (22 1069)   PAP Systolic: PAP Systolic: 42 (2349/23 9237)   PAP Diastolic: PAP Diastolic: 24 (62/14/34 1352)   PVR:     SV02: SVO2 (%): 52 % (22 1242)   SCV02:      EKG/Rhythm:    EKG Results       Procedure 720 Value Units Date/Time    EKG, 12 LEAD, INITIAL [[id number]]     Order Status: Sent     EKG, 12 LEAD, INITIAL [[id number]] Collected: 22 1532    Order Status: Completed Updated: 22 1017     Ventricular Rate 77 BPM      Atrial Rate 77 BPM      P-R Interval 164 ms      QRS Duration 82 ms      Q-T Interval 370 ms      QTC Calculation (Bezet) 418 ms      Calculated P Axis 49 degrees      Calculated R Axis -6 degrees      Calculated T Axis 108 degrees      Diagnosis --     Normal sinus rhythm  Minimal voltage criteria for LVH, may be normal variant ( R in aVL )  Nonspecific ST and T wave abnormality  Poor R-wave Progression (consider lead placement or loss of anterior forces)    Confirmed by Crystal Sanders MD ([id number]) on 11/18/2022 10:17:13 AM      EKG, 12 LEAD, INITIAL [[id number]] Collected: 11/15/22 1728    Order Status: Completed Updated: 11/16/22 1136     Ventricular Rate 83 BPM      Atrial Rate 83 BPM      P-R Interval 160 ms      QRS Duration 84 ms      Q-T Interval 358 ms      QTC Calculation (Bezet) 420 ms      Calculated P Axis 50 degrees      Calculated R Axis 3 degrees      Calculated T Axis 126 degrees      Diagnosis --     Normal sinus rhythm  Left ventricular hypertrophy with repolarization abnormality  When compared with ECG of 14-SEP-2022 12:27,  No significant change was found  Confirmed by Elen Hernandez ([id number]) on 11/16/2022 11:35:53 AM              CT Output: 70 in 24 hrs    Ventilator:  Ventilator Volumes  Vt Set (ml): 450 ml (11/26/22 1111)  Vt Exhaled (Machine Breath) (ml): 465 ml (11/26/22 1111)  Vt Spont (ml): 465 ml (11/26/22 1111)  Ve Observed (l/min): 9.3 l/min (11/26/22 1111)        Oxygen Therapy:  Oxygen Therapy  O2 Sat (%): 97 % (11/26/22 1111)  Pulse via Oximetry: 111 beats per minute (11/26/22 0500)  O2 Device: Endotracheal tube (11/26/22 0400)  Skin Assessment: Clean, dry, & intact (11/26/22 0400)  Skin Protection for O2 Device: N/A (11/26/22 0400)  O2 Flow Rate (L/min): 6 l/min (11/24/22 1600)  FIO2 (%): 60 % (11/26/22 1111)    CXR:    CXR Results  (Last 48 hours)                 11/26/22 0423  XR CHEST PORT Final result    Impression:  No interval change. Narrative:  INDICATION: ET tube placement. Portable AP view of the chest.       Direct comparison made to prior chest x-ray dated November 25, 2022. Cardiomediastinal silhouette is stable. Tubes and lines are unchanged. Lungs are   grossly clear bilaterally. Pleural spaces are normal and there is no   pneumothorax. Osseous structures are intact. 11/25/22 0835  XR CHEST PORT Final result    Impression:  Worsening pulmonary edema and pleural effusions. Narrative:  INDICATION: hypoxia       EXAMINATION:  AP CHEST, PORTABLE       COMPARISON: 11/24/2022       FINDINGS: Single AP portable view of the chest demonstrates interval placement   of a nasogastric tube which extends to the left upper quadrant the tip not   included on the study. Remaining life support lines and tubes are unchanged. The   cardiomediastinal silhouette is unchanged. Increasing pleural effusions and mild   pulmonary edema. Admission Weight: Last Weight   Weight: 257 lb 15 oz (117 kg) Weight: 274 lb 7.6 oz (124.5 kg)     Intake / Output / Drain:  Current Shift: 11/26 0701 - 11/26 1900  In: 624 [I.V.:624]  Out: 1225 [Urine:1225]  Last 24 hrs.:   Intake/Output Summary (Last 24 hours) at 11/26/2022 1438  Last data filed at 11/26/2022 1425  Gross per 24 hour   Intake 3341.86 ml   Output 3140 ml   Net 201.86 ml         EXAM:  General: Intubated, awake, following commands, COKER, calm. Lungs:    Coarse to auscultation bilaterally   Incision:  Incision clean, dry and intact and prineo intact   Heart:   Regular rate and rhythm  and no murmurs, rubs or gallops   Abdomen:    Distended, hypoactive bowel sounds, and obese   Extremities:  +1 pitting edema BLE, BUE   Neurologic:  Intubated, awake, following commands, COKER, calm. Labs:   Recent Labs     11/26/22  1348 11/26/22  0441 11/26/22  0438 11/26/22  0437   WBC  --   --  11.7*  --    HGB  --   --  7.5*  --    HCT  --   --  23.1*  --    PLT  --   --  120*  --    NA  --   --   --  136   K  --   --   --  4.9   BUN  --   --   --  36*   CREA  --   --   --  1.33*   GLU  --   --   --  149*   GLUCPOC 146*   < >  --   --     < > = values in this interval not displayed.           Assessment:     Active Problems:    Acute non-Q wave non-ST elevation myocardial infarction (NSTEMI) ([facility] [address] (11/15/2022)      Aortic stenosis (11/22/2022)      CAD (coronary artery disease) (11/22/2022)      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)      Overview: On pump CORONARY ARTERY BYPASS GRAFT  X 1 WITH LEFT INTERNAL MAMMARY       ARTERY to LAD      AORTIC VALVE REPLACEMENT WITH MEYER 25MM INSPIRIS RESILIA TISSUE VALVE       REPAIR OF ASCENDING AORTIC ANEURYSM with 26mm hemashield graft      S/P AVR (aortic valve replacement) and aortoplasty (11/22/2022)      Overview: On pump CORONARY ARTERY BYPASS GRAFT  X 1 WITH LEFT INTERNAL MAMMARY       ARTERY to LAD      AORTIC VALVE REPLACEMENT WITH MEYER 25MM INSPIRIS RESILIA TISSUE VALVE       REPAIR OF ASCENDING AORTIC ANEURYSM with 26mm hemashield graft       Plan/Recommendations/Medical Decision Making:     Severe symptomatic AS, s/p tissue AVR. Continue ASA. CAD, STEMI, s/p CABG. Continue ASA, amio, statin. Consider BB when off pressors. Wean Epi for MAP greater than 75. Acute hypoxic respiratory failure: Patient developed hypoxia at approximately 11pm on 11/24 requiring Bipap and was ultimately re-intubated at 0200 for acute respiratory failure. Bronchoscopy was completed 11/24 and was unremarkable. SYLWIA was completed 11/24 with normal RV and LVF without effusion or shunting. CTA was no completed due to SERGE. Heparin gtt for possible PE discontinued by [facility]. Lasix 40 mg IV TID today. Acute kidney injury. Creatinine 2.01>1.79>1.23>1. 33. Nephrology consult appreciated, avoid nephrotoxins, trend. Acute blood loss anemia: H&H 7.5/23. 1. Monitor. Transaminitis. Avoid hepatotoxins, trend. Leukocytosis. WBC 20.6>18.6>17.1>11.7. febrile, tm 102.4 last 24, now on Flagyl; vanco, and cefepime, BC, Sputum and urine cultures NTD.  HTN. On ACEI, BB PTA; resume BB when appropriate. HLD. Continue statin. WALLY , not on CPAP. Was unable to tolerate due to anxiety; he has been working on this with Sleep Medicine. OP follow-up. DMII. On Toujeo at home. Repeat A1C 9.0- Insulin gtt per protocol.  Diabetes management following. Hypothyroidism. On Synthroid PTA; continue. Repeat TSH 1.96. GERD. Continue PPI. Anxiety and depression. On Buspar, Zoloft; continue. Supportive care. Nutrition: trickle TF today.       DVT ppx- SCDs  Dispo- ICU today       Signed By: Noa Ritter PA-C

## 2022-11-26 NOTE — PROGRESS NOTES
Problem: Diabetes Self-Management  Goal: *Disease process and treatment process  Description: Define diabetes and identify own type of diabetes; list 3 options for treating diabetes. Outcome: Progressing Towards Goal  Goal: *Incorporating nutritional management into lifestyle  Description: Describe effect of type, amount and timing of food on blood glucose; list 3 methods for planning meals. Outcome: Progressing Towards Goal  Goal: *Incorporating physical activity into lifestyle  Description: State effect of exercise on blood glucose levels. Outcome: Progressing Towards Goal  Goal: *Developing strategies to promote health/change behavior  Description: Define the ABC's of diabetes; identify appropriate screenings, schedule and personal plan for screenings. Outcome: Progressing Towards Goal  Goal: *Using medications safely  Description: State effect of diabetes medications on diabetes; name diabetes medication taking, action and side effects. Outcome: Progressing Towards Goal  Goal: *Monitoring blood glucose, interpreting and using results  Description: Identify recommended blood glucose targets  and personal targets. Outcome: Progressing Towards Goal  Goal: *Prevention, detection, treatment of acute complications  Description: List symptoms of hyper- and hypoglycemia; describe how to treat low blood sugar and actions for lowering  high blood glucose level. Outcome: Progressing Towards Goal  Goal: *Prevention, detection and treatment of chronic complications  Description: Define the natural course of diabetes and describe the relationship of blood glucose levels to long term complications of diabetes.   Outcome: Progressing Towards Goal  Goal: *Developing strategies to address psychosocial issues  Description: Describe feelings about living with diabetes; identify support needed and support network  Outcome: Progressing Towards Goal  Goal: *Insulin pump training  Outcome: Progressing Towards Goal  Goal: *Sick day guidelines  Outcome: Progressing Towards Goal  Goal: *Patient Specific Goal (EDIT GOAL, INSERT TEXT)  Outcome: Progressing Towards Goal     Problem: Patient Education: Go to Patient Education Activity  Goal: Patient/Family Education  Outcome: Progressing Towards Goal     Problem: Falls - Risk of  Goal: *Absence of Falls  Description: Document Shena Han Fall Risk and appropriate interventions in the flowsheet.   Outcome: Progressing Towards Goal  Note: Fall Risk Interventions:  Mobility Interventions: Bed/chair exit alarm, Communicate number of staff needed for ambulation/transfer, Strengthening exercises (ROM-active/passive)    Mentation Interventions: Adequate sleep, hydration, pain control, Bed/chair exit alarm, More frequent rounding, Room close to nurse's station, Increase mobility    Medication Interventions: Bed/chair exit alarm, Evaluate medications/consider consulting pharmacy    Elimination Interventions: Bed/chair exit alarm, Toileting schedule/hourly rounds    History of Falls Interventions: Bed/chair exit alarm, Door open when patient unattended, Room close to nurse's station         Problem: Patient Education: Go to Patient Education Activity  Goal: Patient/Family Education  Outcome: Progressing Towards Goal     Problem: Patient Education: Go to Patient Education Activity  Goal: Patient/Family Education  Outcome: Progressing Towards Goal     Problem: Cath Lab Procedures: Pre-Procedure  Goal: Off Pathway (Use only if patient is Off Pathway)  Outcome: Progressing Towards Goal  Goal: Activity/Safety  Outcome: Progressing Towards Goal  Goal: Consults, if ordered  Outcome: Progressing Towards Goal  Goal: Diagnostic Test/Procedures  Outcome: Progressing Towards Goal  Goal: Nutrition/Diet  Outcome: Progressing Towards Goal  Goal: Discharge Planning  Outcome: Progressing Towards Goal  Goal: Medications  Outcome: Progressing Towards Goal  Goal: Respiratory  Outcome: Progressing Towards Goal  Goal: Treatments/Interventions/Procedures  Outcome: Progressing Towards Goal  Goal: Psychosocial  Outcome: Progressing Towards Goal  Goal: *Verbalize description of procedure  Outcome: Progressing Towards Goal  Goal: *Consent signed  Outcome: Progressing Towards Goal     Problem: Cath Lab Procedures: Post-Cath Day of Procedure (Initiate SCIP Measures for Post-Op Care)  Goal: Off Pathway (Use only if patient is Off Pathway)  Outcome: Progressing Towards Goal  Goal: Activity/Safety  Outcome: Progressing Towards Goal  Goal: Consults, if ordered  Outcome: Progressing Towards Goal  Goal: Diagnostic Test/Procedures  Outcome: Progressing Towards Goal  Goal: Nutrition/Diet  Outcome: Progressing Towards Goal  Goal: Discharge Planning  Outcome: Progressing Towards Goal  Goal: Medications  Outcome: Progressing Towards Goal  Goal: Respiratory  Outcome: Progressing Towards Goal  Goal: Treatments/Interventions/Procedures  Outcome: Progressing Towards Goal  Goal: Psychosocial  Outcome: Progressing Towards Goal  Goal: *Procedure site is without bleeding and signs of infection six hours post sheath removal  Outcome: Progressing Towards Goal  Goal: *Hemodynamically stable  Outcome: Progressing Towards Goal  Goal: *Optimal pain control at patient's stated goal  Outcome: Progressing Towards Goal     Problem: Cath Lab Procedures: Post-Cath Day 1  Goal: Off Pathway (Use only if patient is Off Pathway)  Outcome: Progressing Towards Goal  Goal: Activity/Safety  Outcome: Progressing Towards Goal  Goal: Diagnostic Test/Procedures  Outcome: Progressing Towards Goal  Goal: Nutrition/Diet  Outcome: Progressing Towards Goal  Goal: Discharge Planning  Outcome: Progressing Towards Goal  Goal: Medications  Outcome: Progressing Towards Goal  Goal: Respiratory  Outcome: Progressing Towards Goal  Goal: Treatments/Interventions/Procedures  Outcome: Progressing Towards Goal  Goal: Psychosocial  Outcome: Progressing Towards Goal     Problem: Cath Lab Procedures: Discharge Outcomes  Goal: *Stable cardiac rhythm  Outcome: Progressing Towards Goal  Goal: *Hemodynamically stable  Outcome: Progressing Towards Goal  Goal: *Optimal pain control at patient's stated goal  Outcome: Progressing Towards Goal  Goal: *Pulses palpable, skin color within defined limits, skin temperature warm  Outcome: Progressing Towards Goal  Goal: *Lungs clear or at baseline  Outcome: Progressing Towards Goal  Goal: *Demonstrates ability to perform prescribed activity without shortness of breath or discomfort  Outcome: Progressing Towards Goal  Goal: *Verbalizes home exercise program, activity guidelines, cardiac precautions  Outcome: Progressing Towards Goal  Goal: *Verbalizes understanding and describes prescribed diet  Outcome: Progressing Towards Goal  Goal: *Verbalizes understanding and describes medication purposes and frequencies  Outcome: Progressing Towards Goal  Goal: *Identifies cardiac risk factors  Outcome: Progressing Towards Goal  Goal: *No signs and symptoms of infection or wound complications  Outcome: Progressing Towards Goal  Goal: *Anxiety reduced or absent  Outcome: Progressing Towards Goal  Goal: *Verbalizes and demonstrates incision care  Outcome: Progressing Towards Goal  Goal: *Understands and describes signs and symptoms to report to providers(Stroke Metric)  Outcome: Progressing Towards Goal  Goal: *Describes follow-up/return visits to physicians  Outcome: Progressing Towards Goal  Goal: *Describes available resources and support systems  Outcome: Progressing Towards Goal  Goal: *Influenza immunization  Outcome: Progressing Towards Goal  Goal: *Pneumococcal immunization  Outcome: Progressing Towards Goal

## 2022-11-26 NOTE — PROGRESS NOTES
Pharmacy Automatic Renal Dosing Protocol - Antimicrobials    Indication for Antimicrobials: sepsis     Current Regimen of Each Antimicrobial:   Cefepime 2g IV q8h ; started ; day 2  Vancomycin - pharmacy to dose; started ; day 2  Metronidazole 500 mg IV q12h; started ; day 2    Previous Antimicrobial Therapy:   Periop cefazolin and vanc     Goal Level: VANCOMYCIN TROUGH GOAL RANGE  Vancomycin Trough: 15 - 20 mcg/mL  (AUC: 400 - 600 mg/hr/Liter/day)     Date Dose & Interval Measured (mcg/mL) Extrapolated (mcg/mL)    S/p load 8 -                 Significant Cultures:    blood cx NGTD pending   Sputum cx (ETT aspirate) NGTD, pending      Paralysis, amputations, malnutrition: -    Labs:  Recent Labs     22  0438 22  0437 22  1452 22  0201 22  0405 22  0403   CREA  --  1.33* 1.19 1.24 1.23   < >  --    BUN  --  36* 31* 29* 30*   < >  --    WBC 11.7*  --   --   --  17.1*  --  18.6*    < > = values in this interval not displayed. Temp (24hrs), Av.7 °F (38.7 °C), Min:100.9 °F (38.3 °C), Max:102.4 °F (39.1 °C)    Creatinine Clearance (mL/min) or Dialysis: 71.6 mL/min (IBW)    Impression/Plan:   Scr increased some from yesterday - watch closely  Vancomycin level below goal range, cleared quickly  Will increase vancomycin to 1000 mg q12h for now, anticipated  and check trough tomorrow  Antimicrobial stop date pending     Pharmacy will follow daily and adjust medications as appropriate for renal function and/or serum levels.     Thank you,  ANGELICA Cifuentes

## 2022-11-26 NOTE — PROGRESS NOTES
Critical Care Progress Note  Placido Johnson MD          Date of Service:  2022  NAME:  Moisés Whiting  :  1980  MRN:  [id number]      Subjective/Hospital course:      37 y/o male POD 1 from CABG x1 an AV valve replacement. He arrived intubated and on pressors. Extubated  on POD 0 @ 19:00. Noted to have SERGE with creatinine rise 2. Possibly secondary to hypotension in OR. Night of  was worked up for hypoxemia. This morning he tested positive for Covid. Case discussed with cT surgery. Most likely diagnosis is PE most likely secondary to hypercoaguable state from Covid. Pt had no respiratory symptoms preop or post op suggestive of URI      - Overnight developed hypoxemia. Pt had bronch and SYLWIA neither of which explained cause for hypoxemia. He required re-intubation and was put on veletri.  - intermittent desaturation events.   CXR this a.m. with pulmonary edema   - fio2 improved         Assessment/Plan:     PULMONARY:  Acute hypoxic resopiratory failure   Covid rapid +, PCR negative  D dimer / LE doppler negative - unlikely PE  Pulmonary edema/ pleural effusions    Plan  - stop heparin - discussed with CTS  - Vent settings established, reviewed and/or adjusted as per orders  - Continue nebulized bronchodilators and steroids  - Supplemental O2 to maintain SpO2 92-97%  - Daily SBT when meets criteria  - Wean veletri to off       CARDIOVASCULAR/HEMODYNAMIC:  Volume overload  Suspect some degree of RV failue  Unable to visualize on echo but PA pressures elevated on monitor   >7L positive for hospital stay         Current vasopressors: Epi 2     Plan  - continue lasix   - ICU hemodynamic/cardiac monitoring  - MAP goal > 65 mmHg        RENAL:  SERGE    Plan  - Monitor I/Os and Uo  - Monitor renal function panel intermittently  - Correct electrolytes as needed  - Avoid nephrotoxic meds      GI/NUTRITION:    Plan  - start trickle feeds        INFECTIOUS DISEASE  Covid ruled out with 2 negative PCR  Intermittent fevers  Cultures pending       Micro:    Plan  - Continue broad spectrum abx       NEURO  Sedation for veny synchrony    Plan    - Daily SAT when meets ICU criteria  - ABCDEF mobility bundle  - PT/OT involvement when appropriate          Care Plan discussed with: Nurse and     I personally spent 45 minutes of critical care time. This is time spent at this critically ill patient's bedside actively involved in patient care as well as the coordination of care and discussions with the patient's family. This does not include any procedural time which has been billed separately. Review of Systems:   ROS   Intubated     Vital Signs:   Patient Vitals for the past 4 hrs:   Temp   11/26/22 1504 100.2 °F (37.9 °C)          Intake/Output Summary (Last 24 hours) at 11/26/2022 1600  Last data filed at 11/26/2022 1425  Gross per 24 hour   Intake 3006.7 ml   Output 2930 ml   Net 76.7 ml          Physical Examination:    Physical Exam  Constitutional:       Interventions: He is intubated. Comments: Intubated sedated   HENT:      Head: Normocephalic. Mouth/Throat:      Mouth: Mucous membranes are moist.   Eyes:      Pupils: Pupils are equal, round, and reactive to light. Pulmonary:      Effort: He is intubated. Breath sounds: No wheezing or rhonchi. Abdominal:      Palpations: Abdomen is soft. Musculoskeletal:      Right lower leg: Edema present. Left lower leg: Edema present. Labs and Imaging:   Reviewed.       Medications:     Current Facility-Administered Medications   Medication Dose Route Frequency    vancomycin (VANCOCIN) 1,000 mg in 0.9% sodium chloride 250 mL ([id number])  1,000 mg IntraVENous Q12H    [START ON 11/27/2022] Vancomycin  trough prior to dose due 11/27 at 0500   Other ONCE    furosemide (LASIX) injection 40 mg  40 mg IntraVENous Q8H    nitroGLYcerin (Tridil) 200 mcg/ml infusion  0-200 mcg/min IntraVENous TITRATE    enoxaparin (LOVENOX) injection 30 mg  30 mg SubCUTAneous Q12H    cefepime (MAXIPIME) 2 g in 0.9% sodium chloride (MBP/ADV) 100 mL MBP  2 g IntraVENous Q8H    metroNIDAZOLE (FLAGYL) IVPB premix 500 mg  500 mg IntraVENous Q12H    dexmedeTOMidine in 0.9 % NaCl (PRECEDEX) 400 mcg/100 mL (4 mcg/mL) infusion soln  0.1-1.5 mcg/kg/hr IntraVENous TITRATE    fentaNYL (PF) 1,500 mcg/30 mL (50 mcg/mL) infusion  0-200 mcg/hr IntraVENous TITRATE    [Held by provider] epoprostenol (VELETRI) 30 mcg/mL in 0.9% sodium chloride 50 mL inhalation solution  10 ng/kg/min (Ideal) Inhalation CONTINUOUS    propofol (DIPRIVAN) 10 mg/mL infusion  0-50 mcg/kg/min IntraVENous TITRATE    0.9% sodium chloride infusion  10 mL/hr IntraVENous CONTINUOUS    HYDROcodone-acetaminophen (NORCO) 5-325 mg per tablet 1 Tablet  1 Tablet Oral Q4H PRN    HYDROcodone-acetaminophen (NORCO) 7.5-325 mg per tablet 1 Tablet  1 Tablet Oral Q4H PRN    HYDROmorphone (DILAUDID) injection 1 mg  1 mg IntraVENous Q4H PRN    HYDROmorphone (DILAUDID) injection 0.5 mg  0.5 mg IntraVENous Q4H PRN    acetaminophen (TYLENOL) tablet 500 mg  500 mg Oral Q6H PRN    niCARdipine (CARDENE) 25 mg in 0.9% sodium chloride 250 mL ([id number])  5-15 mg/hr IntraVENous TITRATE    sodium chloride (NS) flush 5-40 mL  5-40 mL IntraVENous Q8H    sodium chloride (NS) flush 5-40 mL  5-40 mL IntraVENous PRN    ondansetron (ZOFRAN) injection 4 mg  4 mg IntraVENous PRN    bacitracin 500 unit/gram packet 1 Packet  1 Packet Topical PRN    0.45% sodium chloride infusion  10 mL/hr IntraVENous CONTINUOUS    naloxone (NARCAN) injection 0.4 mg  0.4 mg IntraVENous PRN    mupirocin (BACTROBAN) 2 % ointment   Both Nostrils BID    [Held by provider] amiodarone (CORDARONE) tablet 400 mg  400 mg Oral BID    ondansetron (ZOFRAN) injection 4 mg  4 mg IntraVENous Q4H PRN    albuterol (PROVENTIL VENTOLIN) nebulizer solution 2.5 mg  2.5 mg Nebulization Q4H PRN    aspirin chewable tablet 81 mg  81 mg Oral DAILY midazolam (VERSED) injection 1 mg  1 mg IntraVENous Q1H PRN    chlorhexidine (PERIDEX) 0.12 % mouthwash 10 mL  10 mL Oral BID    magnesium oxide (MAG-OX) tablet 400 mg  400 mg Oral BID    calcium chloride 1 g in 0.9% sodium chloride 250 mL IVPB  1 g IntraVENous PRN    polyethylene glycol (MIRALAX) packet 17 g  17 g Oral DAILY    senna-docusate (PERICOLACE) 8.6-50 mg per tablet 1 Tablet  1 Tablet Oral DAILY    ELECTROLYTE REPLACEMENT NOTE: Nurse to review Serum Potassium and Magnesuim levels and Initiate Electrolyte Replacement Protocol as needed  1 Each Other PRN    magnesium sulfate 1 g/100 ml IVPB (premix or compounded)  1 g IntraVENous PRN    glucose chewable tablet 16 g  4 Tablet Oral PRN    glucagon (GLUCAGEN) injection 1 mg  1 mg IntraMUSCular PRN    insulin lispro (HUMALOG) injection   SubCUTAneous AC&HS    insulin glargine (LANTUS) injection 1-50 Units  1-50 Units SubCUTAneous ONCE OVER 24 HOURS    lactated ringers bolus infusion 250 mL  250 mL IntraVENous Q1H PRN    dextrose 10 % infusion 0-250 mL  0-250 mL IntraVENous PRN    melatonin tablet 3-6 mg  3-6 mg Oral QHS PRN    EPINEPHrine (ADRENALIN) 5 mg in 0.9% sodium chloride 250 mL infusion  0-10 mcg/min IntraVENous TITRATE    lidocaine 4 % patch 2 Patch  2 Patch TransDERmal Q24H    insulin regular (NOVOLIN R, HUMULIN R) 100 Units in 0.9% sodium chloride 100 mL infusion  1-25 Units/hr IntraVENous TITRATE    levothyroxine (SYNTHROID) tablet 125 mcg  125 mcg Oral 6am    pantoprazole (PROTONIX) tablet 40 mg  40 mg Oral ACB    LORazepam (ATIVAN) tablet 1 mg  1 mg Oral Q8H PRN    rosuvastatin (CRESTOR) tablet 40 mg  40 mg Oral DAILY    sertraline (ZOLOFT) tablet 100 mg  100 mg Oral DAILY    busPIRone (BUSPAR) tablet 15 mg  15 mg Oral TID     ______________________________________________________________________  EXPECTED LENGTH OF STAY: 1d 19h  ACTUAL LENGTH OF STAY:          11                 Michaell Epley, MD   Pulmonary/CCM  [facility]  [phone]

## 2022-11-26 NOTE — PROGRESS NOTES
Physical therapy:    Chart reviewed. Noted patient remains intubated and sedated. Pt is not appropriate for skilled PT intervention at this time. Will hold and continue to follow. Thank you.     Jaz Churchill, PT, DPT

## 2022-11-27 ENCOUNTER — APPOINTMENT (OUTPATIENT)
Dept: ULTRASOUND IMAGING | Age: 42
End: 2022-11-27
Attending: INTERNAL MEDICINE
Payer: COMMERCIAL

## 2022-11-27 ENCOUNTER — APPOINTMENT (OUTPATIENT)
Dept: NON INVASIVE DIAGNOSTICS | Age: 42
End: 2022-11-27
Attending: PHYSICIAN ASSISTANT
Payer: COMMERCIAL

## 2022-11-27 ENCOUNTER — APPOINTMENT (OUTPATIENT)
Dept: GENERAL RADIOLOGY | Age: 42
End: 2022-11-27
Attending: INTERNAL MEDICINE
Payer: COMMERCIAL

## 2022-11-27 ENCOUNTER — APPOINTMENT (OUTPATIENT)
Dept: GENERAL RADIOLOGY | Age: 42
End: 2022-11-27
Attending: PHYSICIAN ASSISTANT
Payer: COMMERCIAL

## 2022-11-27 LAB
ADMINISTERED INITIALS, ADMINIT: NORMAL
ALBUMIN SERPL-MCNC: 2.6 G/DL (ref 3.5–5)
ALBUMIN SERPL-MCNC: 2.7 G/DL (ref 3.5–5)
ALBUMIN/GLOB SERPL: 0.7 {RATIO} (ref 1.1–2.2)
ALBUMIN/GLOB SERPL: 1 {RATIO} (ref 1.1–2.2)
ALP SERPL-CCNC: 279 U/L (ref 45–117)
ALP SERPL-CCNC: 451 U/L (ref 45–117)
ALT SERPL-CCNC: 1124 U/L (ref 12–78)
ALT SERPL-CCNC: 1137 U/L (ref 12–78)
AMMONIA PLAS-SCNC: 46 UMOL/L
AMYLASE SERPL-CCNC: 28 U/L (ref 25–115)
ANION GAP SERPL CALC-SCNC: 5 MMOL/L (ref 5–15)
APTT PPP: 28.9 SEC (ref 22.1–31)
ARTERIAL PATENCY WRIST A: ABNORMAL
AST SERPL-CCNC: 1433 U/L (ref 15–37)
AST SERPL-CCNC: >2000 U/L (ref 15–37)
BACTERIA SPEC CULT: NORMAL
BASE EXCESS BLD CALC-SCNC: 0.2 MMOL/L
BASOPHILS # BLD: 0 K/UL (ref 0–0.1)
BASOPHILS # BLD: 0 K/UL (ref 0–0.1)
BASOPHILS NFR BLD: 0 % (ref 0–1)
BASOPHILS NFR BLD: 0 % (ref 0–1)
BDY SITE: ABNORMAL
BDY SITE: ABNORMAL
BILIRUB SERPL-MCNC: 1.1 MG/DL (ref 0.2–1)
BILIRUB SERPL-MCNC: 1.3 MG/DL (ref 0.2–1)
BUN SERPL-MCNC: 45 MG/DL (ref 6–20)
BUN SERPL-MCNC: 48 MG/DL (ref 6–20)
BUN SERPL-MCNC: 49 MG/DL (ref 6–20)
BUN/CREAT SERPL: 33 (ref 12–20)
BUN/CREAT SERPL: 36 (ref 12–20)
BUN/CREAT SERPL: 39 (ref 12–20)
CALCIUM SERPL-MCNC: 7.6 MG/DL (ref 8.5–10.1)
CALCIUM SERPL-MCNC: 7.7 MG/DL (ref 8.5–10.1)
CALCIUM SERPL-MCNC: 8 MG/DL (ref 8.5–10.1)
CHLORIDE SERPL-SCNC: 105 MMOL/L (ref 97–108)
CO2 SERPL-SCNC: 28 MMOL/L (ref 21–32)
COHGB MFR BLD: 0 % (ref 1–2)
CREAT SERPL-MCNC: 1.15 MG/DL (ref 0.7–1.3)
CREAT SERPL-MCNC: 1.34 MG/DL (ref 0.7–1.3)
CREAT SERPL-MCNC: 1.47 MG/DL (ref 0.7–1.3)
D50 ADMINISTERED, D50ADM: 0 ML
D50 ORDER, D50ORD: 0 ML
DATE LAST DOSE: NORMAL
DIFFERENTIAL METHOD BLD: ABNORMAL
DIFFERENTIAL METHOD BLD: ABNORMAL
EOSINOPHIL # BLD: 0.1 K/UL (ref 0–0.4)
EOSINOPHIL # BLD: 0.4 K/UL (ref 0–0.4)
EOSINOPHIL NFR BLD: 1 % (ref 0–7)
EOSINOPHIL NFR BLD: 3 % (ref 0–7)
ERYTHROCYTE [DISTWIDTH] IN BLOOD BY AUTOMATED COUNT: 14.7 % (ref 11.5–14.5)
GAS FLOW.O2 O2 DELIVERY SYS: ABNORMAL L/MIN
GAS FLOW.O2 SETTING OXYMISER: 20 BPM
GLOBULIN SER CALC-MCNC: 2.7 G/DL (ref 2–4)
GLOBULIN SER CALC-MCNC: 3.6 G/DL (ref 2–4)
GLUCOSE BLD STRIP.AUTO-MCNC: 104 MG/DL (ref 65–117)
GLUCOSE BLD STRIP.AUTO-MCNC: 106 MG/DL (ref 65–117)
GLUCOSE BLD STRIP.AUTO-MCNC: 110 MG/DL (ref 65–117)
GLUCOSE BLD STRIP.AUTO-MCNC: 111 MG/DL (ref 65–117)
GLUCOSE BLD STRIP.AUTO-MCNC: 113 MG/DL (ref 65–117)
GLUCOSE BLD STRIP.AUTO-MCNC: 125 MG/DL (ref 65–117)
GLUCOSE BLD STRIP.AUTO-MCNC: 126 MG/DL (ref 65–117)
GLUCOSE BLD STRIP.AUTO-MCNC: 127 MG/DL (ref 65–117)
GLUCOSE BLD STRIP.AUTO-MCNC: 128 MG/DL (ref 65–117)
GLUCOSE BLD STRIP.AUTO-MCNC: 129 MG/DL (ref 65–117)
GLUCOSE BLD STRIP.AUTO-MCNC: 139 MG/DL (ref 65–117)
GLUCOSE BLD STRIP.AUTO-MCNC: 76 MG/DL (ref 65–117)
GLUCOSE BLD STRIP.AUTO-MCNC: 95 MG/DL (ref 65–117)
GLUCOSE BLD STRIP.AUTO-MCNC: 97 MG/DL (ref 65–117)
GLUCOSE SERPL-MCNC: 100 MG/DL (ref 65–100)
GLUCOSE SERPL-MCNC: 109 MG/DL (ref 65–100)
GLUCOSE SERPL-MCNC: 121 MG/DL (ref 65–100)
GLUCOSE, GLC: 104 MG/DL
GLUCOSE, GLC: 106 MG/DL
GLUCOSE, GLC: 110 MG/DL
GLUCOSE, GLC: 111 MG/DL
GLUCOSE, GLC: 113 MG/DL
GLUCOSE, GLC: 125 MG/DL
GLUCOSE, GLC: 126 MG/DL
GLUCOSE, GLC: 127 MG/DL
GLUCOSE, GLC: 128 MG/DL
GLUCOSE, GLC: 129 MG/DL
GLUCOSE, GLC: 139 MG/DL
GLUCOSE, GLC: 76 MG/DL
GLUCOSE, GLC: 95 MG/DL
GLUCOSE, GLC: 97 MG/DL
GRAM STN SPEC: NORMAL
HBV SURFACE AB SER QL: NONREACTIVE
HBV SURFACE AB SER-ACNC: <3.1 MIU/ML
HCO3 BLD-SCNC: 25.6 MMOL/L (ref 22–26)
HCT VFR BLD AUTO: 24.1 % (ref 36.6–50.3)
HCT VFR BLD AUTO: 24.2 % (ref 36.6–50.3)
HCT VFR BLD AUTO: 24.6 % (ref 36.6–50.3)
HGB BLD-MCNC: 7.7 G/DL (ref 12.1–17)
HGB BLD-MCNC: 7.8 G/DL (ref 12.1–17)
HGB BLD-MCNC: 7.9 G/DL (ref 12.1–17)
HIGH TARGET, HITG: 130 MG/DL
HISTORY CHECKED?,CKHIST: NORMAL
IGM SERPL-MCNC: 92 MG/DL (ref 40–230)
IMM GRANULOCYTES # BLD AUTO: 0 K/UL (ref 0–0.04)
IMM GRANULOCYTES # BLD AUTO: 0 K/UL (ref 0–0.04)
IMM GRANULOCYTES NFR BLD AUTO: 0 % (ref 0–0.5)
IMM GRANULOCYTES NFR BLD AUTO: 0 % (ref 0–0.5)
INR PPP: 1.3 (ref 0.9–1.1)
INSULIN ADMINSTERED, INSADM: 1.3 UNITS/HOUR
INSULIN ADMINSTERED, INSADM: 3.5 UNITS/HOUR
INSULIN ADMINSTERED, INSADM: 3.7 UNITS/HOUR
INSULIN ADMINSTERED, INSADM: 4 UNITS/HOUR
INSULIN ADMINSTERED, INSADM: 4.2 UNITS/HOUR
INSULIN ADMINSTERED, INSADM: 4.4 UNITS/HOUR
INSULIN ADMINSTERED, INSADM: 4.6 UNITS/HOUR
INSULIN ADMINSTERED, INSADM: 5 UNITS/HOUR
INSULIN ADMINSTERED, INSADM: 6 UNITS/HOUR
INSULIN ADMINSTERED, INSADM: 6.1 UNITS/HOUR
INSULIN ADMINSTERED, INSADM: 6.4 UNITS/HOUR
INSULIN ADMINSTERED, INSADM: 6.5 UNITS/HOUR
INSULIN ADMINSTERED, INSADM: 6.8 UNITS/HOUR
INSULIN ADMINSTERED, INSADM: 7 UNITS/HOUR
INSULIN ORDER, INSORD: 1.3 UNITS/HOUR
INSULIN ORDER, INSORD: 3.5 UNITS/HOUR
INSULIN ORDER, INSORD: 3.7 UNITS/HOUR
INSULIN ORDER, INSORD: 4 UNITS/HOUR
INSULIN ORDER, INSORD: 4.2 UNITS/HOUR
INSULIN ORDER, INSORD: 4.4 UNITS/HOUR
INSULIN ORDER, INSORD: 4.6 UNITS/HOUR
INSULIN ORDER, INSORD: 5 UNITS/HOUR
INSULIN ORDER, INSORD: 6 UNITS/HOUR
INSULIN ORDER, INSORD: 6.1 UNITS/HOUR
INSULIN ORDER, INSORD: 6.4 UNITS/HOUR
INSULIN ORDER, INSORD: 6.5 UNITS/HOUR
INSULIN ORDER, INSORD: 6.8 UNITS/HOUR
INSULIN ORDER, INSORD: 7 UNITS/HOUR
LACTATE SERPL-SCNC: 1.3 MMOL/L (ref 0.4–2)
LDH SERPL L TO P-CCNC: 1182 U/L (ref 85–241)
LIPASE SERPL-CCNC: 76 U/L (ref 73–393)
LOW TARGET, LOT: 95 MG/DL
LYMPHOCYTES # BLD: 1.7 K/UL (ref 0.8–3.5)
LYMPHOCYTES # BLD: 2.1 K/UL (ref 0.8–3.5)
LYMPHOCYTES NFR BLD: 13 % (ref 12–49)
LYMPHOCYTES NFR BLD: 15 % (ref 12–49)
MAGNESIUM SERPL-MCNC: 2.7 MG/DL (ref 1.6–2.4)
MAGNESIUM SERPL-MCNC: 2.7 MG/DL (ref 1.6–2.4)
MCH RBC QN AUTO: 27.3 PG (ref 26–34)
MCH RBC QN AUTO: 27.4 PG (ref 26–34)
MCH RBC QN AUTO: 27.9 PG (ref 26–34)
MCHC RBC AUTO-ENTMCNC: 31.8 G/DL (ref 30–36.5)
MCHC RBC AUTO-ENTMCNC: 32.1 G/DL (ref 30–36.5)
MCHC RBC AUTO-ENTMCNC: 32.4 G/DL (ref 30–36.5)
MCV RBC AUTO: 85.4 FL (ref 80–99)
MCV RBC AUTO: 85.8 FL (ref 80–99)
MCV RBC AUTO: 86.1 FL (ref 80–99)
METAMYELOCYTES NFR BLD MANUAL: 1 %
METAMYELOCYTES NFR BLD MANUAL: 1 %
METHGB MFR BLD: 0.2 % (ref 0–1.4)
MINUTES UNTIL NEXT BG, NBG: 120 MIN
MINUTES UNTIL NEXT BG, NBG: 60 MIN
MONOCYTES # BLD: 0.1 K/UL (ref 0–1)
MONOCYTES # BLD: 0.9 K/UL (ref 0–1)
MONOCYTES NFR BLD: 1 % (ref 5–13)
MONOCYTES NFR BLD: 7 % (ref 5–13)
MULTIPLIER, MUL: 0.08
MULTIPLIER, MUL: 0.09
MULTIPLIER, MUL: 0.1
MYELOCYTES NFR BLD MANUAL: 1 %
NEUTS SEG # BLD: 10.1 K/UL (ref 1.8–8)
NEUTS SEG # BLD: 11.1 K/UL (ref 1.8–8)
NEUTS SEG NFR BLD: 77 % (ref 32–75)
NEUTS SEG NFR BLD: 80 % (ref 32–75)
NRBC # BLD: 0.02 K/UL (ref 0–0.01)
NRBC # BLD: 0.04 K/UL (ref 0–0.01)
NRBC # BLD: 0.12 K/UL (ref 0–0.01)
NRBC BLD-RTO: 0.2 PER 100 WBC
NRBC BLD-RTO: 0.3 PER 100 WBC
NRBC BLD-RTO: 0.9 PER 100 WBC
O2/TOTAL GAS SETTING VFR VENT: 80 %
ORDER INITIALS, ORDINIT: NORMAL
OXYHGB MFR BLD: 57.8 % (ref 94–97)
PCO2 BLD: 43.7 MMHG (ref 35–45)
PEEP RESPIRATORY: 12 CMH2O
PH BLD: 7.38 [PH] (ref 7.35–7.45)
PHOSPHATE SERPL-MCNC: 4.8 MG/DL (ref 2.6–4.7)
PLATELET # BLD AUTO: 142 K/UL (ref 150–400)
PLATELET # BLD AUTO: 144 K/UL (ref 150–400)
PLATELET # BLD AUTO: 174 K/UL (ref 150–400)
PMV BLD AUTO: 10.2 FL (ref 8.9–12.9)
PMV BLD AUTO: 9.8 FL (ref 8.9–12.9)
PMV BLD AUTO: 9.9 FL (ref 8.9–12.9)
PO2 BLD: 75 MMHG (ref 80–100)
POTASSIUM SERPL-SCNC: 4.5 MMOL/L (ref 3.5–5.1)
POTASSIUM SERPL-SCNC: 4.6 MMOL/L (ref 3.5–5.1)
POTASSIUM SERPL-SCNC: 4.6 MMOL/L (ref 3.5–5.1)
PROCALCITONIN SERPL-MCNC: 1.2 NG/ML
PROT SERPL-MCNC: 5.4 G/DL (ref 6.4–8.2)
PROT SERPL-MCNC: 6.2 G/DL (ref 6.4–8.2)
PROTHROMBIN TIME: 13.3 SEC (ref 9–11.1)
RBC # BLD AUTO: 2.8 M/UL (ref 4.1–5.7)
RBC # BLD AUTO: 2.82 M/UL (ref 4.1–5.7)
RBC # BLD AUTO: 2.88 M/UL (ref 4.1–5.7)
RBC MORPH BLD: ABNORMAL
RBC MORPH BLD: ABNORMAL
REPORTED DOSE,DOSE: NORMAL UNITS
REPORTED DOSE/TIME,TMG: NORMAL
SAO2 % BLD: 58 % (ref 95–99)
SAO2 % BLD: 94.5 % (ref 92–97)
SERVICE CMNT-IMP: ABNORMAL
SERVICE CMNT-IMP: NORMAL
SODIUM SERPL-SCNC: 138 MMOL/L (ref 136–145)
SPECIMEN SITE: ABNORMAL
SPECIMEN TYPE: ABNORMAL
THERAPEUTIC RANGE,PTTT: NORMAL SECS (ref 58–77)
UFH PPP CHRO-ACNC: <0.1 IU/ML
VANCOMYCIN TROUGH SERPL-MCNC: 9.9 UG/ML (ref 5–10)
VENTILATION MODE VENT: ABNORMAL
VT SETTING VENT: 450 ML
WBC # BLD AUTO: 12.5 K/UL (ref 4.1–11.1)
WBC # BLD AUTO: 13.1 K/UL (ref 4.1–11.1)
WBC # BLD AUTO: 13.9 K/UL (ref 4.1–11.1)

## 2022-11-27 PROCEDURE — 74011250637 HC RX REV CODE- 250/637: Performed by: PHYSICIAN ASSISTANT

## 2022-11-27 PROCEDURE — 85027 COMPLETE CBC AUTOMATED: CPT

## 2022-11-27 PROCEDURE — 85520 HEPARIN ASSAY: CPT

## 2022-11-27 PROCEDURE — 65610000006 HC RM INTENSIVE CARE

## 2022-11-27 PROCEDURE — 85730 THROMBOPLASTIN TIME PARTIAL: CPT

## 2022-11-27 PROCEDURE — 87798 DETECT AGENT NOS DNA AMP: CPT

## 2022-11-27 PROCEDURE — 74011000258 HC RX REV CODE- 258: Performed by: INTERNAL MEDICINE

## 2022-11-27 PROCEDURE — 74011250636 HC RX REV CODE- 250/636: Performed by: INTERNAL MEDICINE

## 2022-11-27 PROCEDURE — 74011250636 HC RX REV CODE- 250/636: Performed by: NURSE PRACTITIONER

## 2022-11-27 PROCEDURE — 74011000250 HC RX REV CODE- 250: Performed by: ANESTHESIOLOGY

## 2022-11-27 PROCEDURE — 82150 ASSAY OF AMYLASE: CPT

## 2022-11-27 PROCEDURE — 87522 HEPATITIS C REVRS TRNSCRPJ: CPT

## 2022-11-27 PROCEDURE — 82533 TOTAL CORTISOL: CPT

## 2022-11-27 PROCEDURE — 84100 ASSAY OF PHOSPHORUS: CPT

## 2022-11-27 PROCEDURE — 93308 TTE F-UP OR LMTD: CPT

## 2022-11-27 PROCEDURE — 74011000250 HC RX REV CODE- 250: Performed by: THORACIC SURGERY (CARDIOTHORACIC VASCULAR SURGERY)

## 2022-11-27 PROCEDURE — 85379 FIBRIN DEGRADATION QUANT: CPT

## 2022-11-27 PROCEDURE — 86706 HEP B SURFACE ANTIBODY: CPT

## 2022-11-27 PROCEDURE — 76700 US EXAM ABDOM COMPLETE: CPT

## 2022-11-27 PROCEDURE — 82375 ASSAY CARBOXYHB QUANT: CPT

## 2022-11-27 PROCEDURE — 84145 PROCALCITONIN (PCT): CPT

## 2022-11-27 PROCEDURE — 80074 ACUTE HEPATITIS PANEL: CPT

## 2022-11-27 PROCEDURE — 83690 ASSAY OF LIPASE: CPT

## 2022-11-27 PROCEDURE — 85025 COMPLETE CBC W/AUTO DIFF WBC: CPT

## 2022-11-27 PROCEDURE — 82803 BLOOD GASES ANY COMBINATION: CPT

## 2022-11-27 PROCEDURE — 30233N1 TRANSFUSION OF NONAUTOLOGOUS RED BLOOD CELLS INTO PERIPHERAL VEIN, PERCUTANEOUS APPROACH: ICD-10-PCS | Performed by: THORACIC SURGERY (CARDIOTHORACIC VASCULAR SURGERY)

## 2022-11-27 PROCEDURE — 74018 RADEX ABDOMEN 1 VIEW: CPT

## 2022-11-27 PROCEDURE — 83735 ASSAY OF MAGNESIUM: CPT

## 2022-11-27 PROCEDURE — 80053 COMPREHEN METABOLIC PANEL: CPT

## 2022-11-27 PROCEDURE — 74011250636 HC RX REV CODE- 250/636: Performed by: THORACIC SURGERY (CARDIOTHORACIC VASCULAR SURGERY)

## 2022-11-27 PROCEDURE — 80202 ASSAY OF VANCOMYCIN: CPT

## 2022-11-27 PROCEDURE — 85610 PROTHROMBIN TIME: CPT

## 2022-11-27 PROCEDURE — 82784 ASSAY IGA/IGD/IGG/IGM EACH: CPT

## 2022-11-27 PROCEDURE — 36430 TRANSFUSION BLD/BLD COMPNT: CPT

## 2022-11-27 PROCEDURE — 74011250636 HC RX REV CODE- 250/636: Performed by: PHYSICIAN ASSISTANT

## 2022-11-27 PROCEDURE — 86644 CMV ANTIBODY: CPT

## 2022-11-27 PROCEDURE — 83605 ASSAY OF LACTIC ACID: CPT

## 2022-11-27 PROCEDURE — 87529 HSV DNA AMP PROBE: CPT

## 2022-11-27 PROCEDURE — 71045 X-RAY EXAM CHEST 1 VIEW: CPT

## 2022-11-27 PROCEDURE — 83615 LACTATE (LD) (LDH) ENZYME: CPT

## 2022-11-27 PROCEDURE — 74011636637 HC RX REV CODE- 636/637: Performed by: NURSE PRACTITIONER

## 2022-11-27 PROCEDURE — 74011000258 HC RX REV CODE- 258: Performed by: NURSE PRACTITIONER

## 2022-11-27 PROCEDURE — 86038 ANTINUCLEAR ANTIBODIES: CPT

## 2022-11-27 PROCEDURE — 74011000250 HC RX REV CODE- 250: Performed by: NURSE PRACTITIONER

## 2022-11-27 PROCEDURE — 82140 ASSAY OF AMMONIA: CPT

## 2022-11-27 PROCEDURE — 94003 VENT MGMT INPAT SUBQ DAY: CPT

## 2022-11-27 PROCEDURE — 36415 COLL VENOUS BLD VENIPUNCTURE: CPT

## 2022-11-27 PROCEDURE — 86692 HEPATITIS DELTA AGENT ANTBDY: CPT

## 2022-11-27 PROCEDURE — 86015 ACTIN ANTIBODY EACH: CPT

## 2022-11-27 PROCEDURE — P9016 RBC LEUKOCYTES REDUCED: HCPCS

## 2022-11-27 PROCEDURE — 87517 HEPATITIS B DNA QUANT: CPT

## 2022-11-27 PROCEDURE — 87040 BLOOD CULTURE FOR BACTERIA: CPT

## 2022-11-27 PROCEDURE — 82962 GLUCOSE BLOOD TEST: CPT

## 2022-11-27 PROCEDURE — 86664 EPSTEIN-BARR NUCLEAR ANTIGEN: CPT

## 2022-11-27 RX ORDER — VANCOMYCIN HYDROCHLORIDE
1250 EVERY 12 HOURS
Status: DISCONTINUED | OUTPATIENT
Start: 2022-11-27 | End: 2022-12-02

## 2022-11-27 RX ORDER — HEPARIN SODIUM 1000 [USP'U]/ML
80 INJECTION, SOLUTION INTRAVENOUS; SUBCUTANEOUS AS NEEDED
Status: DISCONTINUED | OUTPATIENT
Start: 2022-11-27 | End: 2022-11-28

## 2022-11-27 RX ORDER — METOCLOPRAMIDE HYDROCHLORIDE 5 MG/ML
5 INJECTION INTRAMUSCULAR; INTRAVENOUS EVERY 6 HOURS
Status: DISCONTINUED | OUTPATIENT
Start: 2022-11-27 | End: 2022-12-05

## 2022-11-27 RX ORDER — SODIUM CHLORIDE 9 MG/ML
250 INJECTION, SOLUTION INTRAVENOUS AS NEEDED
Status: DISPENSED | OUTPATIENT
Start: 2022-11-27 | End: 2022-11-28

## 2022-11-27 RX ORDER — HYDRALAZINE HYDROCHLORIDE 20 MG/ML
20 INJECTION INTRAMUSCULAR; INTRAVENOUS ONCE
Status: ACTIVE | OUTPATIENT
Start: 2022-11-27 | End: 2022-11-27

## 2022-11-27 RX ORDER — HEPARIN SODIUM 10000 [USP'U]/100ML
17-36 INJECTION, SOLUTION INTRAVENOUS
Status: DISCONTINUED | OUTPATIENT
Start: 2022-11-27 | End: 2022-11-28

## 2022-11-27 RX ORDER — HEPARIN SODIUM 1000 [USP'U]/ML
40 INJECTION, SOLUTION INTRAVENOUS; SUBCUTANEOUS AS NEEDED
Status: DISCONTINUED | OUTPATIENT
Start: 2022-11-27 | End: 2022-11-28

## 2022-11-27 RX ADMIN — BUSPIRONE HYDROCHLORIDE 15 MG: 5 TABLET ORAL at 13:16

## 2022-11-27 RX ADMIN — FENTANYL CITRATE 100 MCG/HR: 50 INJECTION INTRAVENOUS at 09:20

## 2022-11-27 RX ADMIN — Medication 1.2 MCG/KG/HR: at 13:30

## 2022-11-27 RX ADMIN — CEFEPIME 2 G: 2 INJECTION, POWDER, FOR SOLUTION INTRAVENOUS at 02:37

## 2022-11-27 RX ADMIN — Medication 1.2 MCG/KG/HR: at 09:33

## 2022-11-27 RX ADMIN — MUPIROCIN: 20 OINTMENT TOPICAL at 08:26

## 2022-11-27 RX ADMIN — FUROSEMIDE 10 MG/HR: 10 INJECTION, SOLUTION INTRAVENOUS at 09:31

## 2022-11-27 RX ADMIN — SODIUM CHLORIDE, PRESERVATIVE FREE 10 ML: 5 INJECTION INTRAVENOUS at 05:42

## 2022-11-27 RX ADMIN — VANCOMYCIN HYDROCHLORIDE 1250 MG: 10 INJECTION, POWDER, LYOPHILIZED, FOR SOLUTION INTRAVENOUS at 17:44

## 2022-11-27 RX ADMIN — VANCOMYCIN HYDROCHLORIDE 1000 MG: 1 INJECTION, POWDER, LYOPHILIZED, FOR SOLUTION INTRAVENOUS at 05:36

## 2022-11-27 RX ADMIN — METRONIDAZOLE 500 MG: 500 INJECTION, SOLUTION INTRAVENOUS at 10:41

## 2022-11-27 RX ADMIN — SENNOSIDES AND DOCUSATE SODIUM 1 TABLET: 50; 8.6 TABLET ORAL at 08:25

## 2022-11-27 RX ADMIN — PANTOPRAZOLE SODIUM 40 MG: 40 TABLET, DELAYED RELEASE ORAL at 08:25

## 2022-11-27 RX ADMIN — FUROSEMIDE 40 MG: 10 INJECTION, SOLUTION INTRAMUSCULAR; INTRAVENOUS at 05:42

## 2022-11-27 RX ADMIN — SODIUM CHLORIDE 2.5 MG/HR: 900 INJECTION, SOLUTION INTRAVENOUS at 00:31

## 2022-11-27 RX ADMIN — METRONIDAZOLE 500 MG: 500 INJECTION, SOLUTION INTRAVENOUS at 21:50

## 2022-11-27 RX ADMIN — PROPOFOL 20 MCG/KG/MIN: 10 INJECTION, EMULSION INTRAVENOUS at 22:39

## 2022-11-27 RX ADMIN — HEPARIN SODIUM 17 UNITS/KG/HR: 10000 INJECTION, SOLUTION INTRAVENOUS at 18:08

## 2022-11-27 RX ADMIN — LEVOTHYROXINE SODIUM 125 MCG: 0.12 TABLET ORAL at 05:51

## 2022-11-27 RX ADMIN — BUSPIRONE HYDROCHLORIDE 15 MG: 5 TABLET ORAL at 08:25

## 2022-11-27 RX ADMIN — Medication 1 MCG/KG/HR: at 01:51

## 2022-11-27 RX ADMIN — ENOXAPARIN SODIUM 30 MG: 100 INJECTION SUBCUTANEOUS at 08:25

## 2022-11-27 RX ADMIN — Medication 1 MCG/KG/HR: at 05:33

## 2022-11-27 RX ADMIN — SODIUM CHLORIDE 6.4 UNITS/HR: 9 INJECTION, SOLUTION INTRAVENOUS at 06:44

## 2022-11-27 RX ADMIN — CEFEPIME 2 G: 2 INJECTION, POWDER, FOR SOLUTION INTRAVENOUS at 10:41

## 2022-11-27 RX ADMIN — POLYETHYLENE GLYCOL 3350 17 G: 17 POWDER, FOR SOLUTION ORAL at 08:25

## 2022-11-27 RX ADMIN — SERTRALINE 100 MG: 50 TABLET, FILM COATED ORAL at 08:25

## 2022-11-27 RX ADMIN — PROPOFOL 15 MCG/KG/MIN: 10 INJECTION, EMULSION INTRAVENOUS at 06:04

## 2022-11-27 RX ADMIN — ROSUVASTATIN CALCIUM 40 MG: 40 TABLET, FILM COATED ORAL at 08:25

## 2022-11-27 RX ADMIN — SODIUM CHLORIDE, PRESERVATIVE FREE 10 ML: 5 INJECTION INTRAVENOUS at 15:29

## 2022-11-27 RX ADMIN — CHLORHEXIDINE GLUCONATE 0.12% ORAL RINSE 10 ML: 1.2 LIQUID ORAL at 21:19

## 2022-11-27 RX ADMIN — MAGNESIUM OXIDE TAB 400 MG (241.3 MG ELEMENTAL MG) 400 MG: 400 (241.3 MG) TAB at 08:25

## 2022-11-27 RX ADMIN — METOCLOPRAMIDE 5 MG: 5 INJECTION, SOLUTION INTRAMUSCULAR; INTRAVENOUS at 18:07

## 2022-11-27 RX ADMIN — METOCLOPRAMIDE 5 MG: 5 INJECTION, SOLUTION INTRAMUSCULAR; INTRAVENOUS at 13:16

## 2022-11-27 RX ADMIN — LACTULOSE 45 ML: 20 SOLUTION ORAL at 10:42

## 2022-11-27 RX ADMIN — Medication 1.2 MCG/KG/HR: at 21:34

## 2022-11-27 RX ADMIN — ASPIRIN 81 MG CHEWABLE TABLET 81 MG: 81 TABLET CHEWABLE at 08:25

## 2022-11-27 RX ADMIN — SODIUM CHLORIDE, PRESERVATIVE FREE 10 ML: 5 INJECTION INTRAVENOUS at 21:21

## 2022-11-27 RX ADMIN — CEFEPIME 2 G: 2 INJECTION, POWDER, FOR SOLUTION INTRAVENOUS at 18:07

## 2022-11-27 NOTE — PROGRESS NOTES
Pharmacy Automatic Renal Dosing Protocol - Antimicrobials    Indication for Antimicrobials: sepsis     Current Regimen of Each Antimicrobial:   Cefepime 2g IV q8h ; started ; day 3  Vancomycin - pharmacy to dose; started ; day 3  Metronidazole 500 mg IV q12h; started ; day 3    Previous Antimicrobial Therapy:   Periop cefazolin and vanc     Goal Level: VANCOMYCIN TROUGH GOAL RANGE  Vancomycin Trough: 15 - 20 mcg/mL  (AUC: 400 - 600 mg/hr/Liter/day)     Date Dose & Interval Measured (mcg/mL) Extrapolated (mcg/mL)    S/p load 8 -    1000 mg q12 9.9 430           Significant Cultures:    blood cx NGTD pending   Sputum cx (ETT aspirate) NGTD, pending      Paralysis, amputations, malnutrition: -    Labs:  Recent Labs     22  0425 22  0009 22  0438 22  0437   CREA 1.15 1.34*  --  1.33*   BUN 45* 48*  --  36*   WBC 13.1* 12.5* 11.7*  --      Temp (24hrs), Av.9 °F (37.7 °C), Min:95.4 °F (35.2 °C), Max:102.7 °F (39.3 °C)    Creatinine Clearance (mL/min) or Dialysis: 82.8 mL/min (IBW)    Impression/Plan:   Scr improved some from yesterday - watch closely  Vancomycin level on the low end of goal range  Will increase vancomycin to 1250 mg q12h for now, anticipated    Antimicrobial stop date pending     Pharmacy will follow daily and adjust medications as appropriate for renal function and/or serum levels.     Thank you,  ANGELICA Hardy

## 2022-11-27 NOTE — PROGRESS NOTES
0700 - report received from Fernandez [place], RN    0800 - shift assessment completed. Patient responds to pain. Pulses palpable. Lung sounds clear over dim. Abdomen noted to be distended. Residuals taken, only got back 100mL. KUB order verbally  by the LOLLY Taylor and completed. 0900 - tube feeds turned off per Providers verbal order.

## 2022-11-27 NOTE — CONSULTS
Gastroenterology Consult     Referring Physician: Dr. Pompa [facility] Date: 11/27/2022     Subjective:     Chief Complaint: severe hepatitis    History of Present Illness: Blu Gonzalez is a 36 y.o. male who is seen in consultation for severe hepatitis. Critically ill. Intubated on low-dose vasopressors and requiring increasing peep and FiO2.    admitted 11/15 with NSTEMI    41yo with T2DM on insulin A1C 9, hypothyroidism on LT4, s/p on-pump CABG 11/22/2022 with tissue aortic valve replacement and ascending aortic aneurysm repair due to bicuspic AV, COVID positive rapic PCR negative 11/23, worsening hypoxemic respiratory failure s/p reintubation 11/24/2022 now with worsening PEEP and FiO2 requirements, severe SERGE 11/24 Cr of 2 now 1.33.    US today with hepatomegaly with diffuse echocenicity limited views, as well as splenomegaly. 11/15/2022, and even September and April of 2022 and 12/2021 with normal LFTs. Since 11/22pm he has had progressive worsening of transaminitis in the 100-300range ALT, 260-350 AST with intermittent mild hyperbilirubinemia of 1.1-1.3.   11/26/2022 LFTs alb 2.7, Tb 1.2, , ,   11/27/2022 LFTs alb 2.7, Tb 1.1, ALT 1137, AST >2000,     He has had mild thrombmocytopenia ranging 144-183 throughout 2022 and the trough was 107 on 11/24. No INR since 11/22 and it was normal at 1.1.         Past Medical History:   Diagnosis Date    Anxiety and depression     anxiety, depression    Bleeding of eye, left     8/17/21 pt reports receiving injections in right eye for beeding    Chronic headaches 2007    COVID-19 12/20/2020    Diabetes type 1, uncontrolled     since 8years old    GERD (gastroesophageal reflux disease)     Hypercholesterolemia     Hypertension     Hypothyroidism     MI (myocardial infarction) ([facility] [address]     as of 8/17/21 pt reports 9 stents total    WALLY on CPAP      Past Surgical History:   Procedure Laterality Date    HX CARPAL TUNNEL RELEASE Left 2012    HX CARPAL TUNNEL RELEASE Right 2018    CTE trigger thumb and index finger    HX HEART CATHETERIZATION      HX HEENT      HX LAP CHOLECYSTECTOMY  14    Dr Laurel Noguera    HX WISDOM TEETH EXTRACTION        Family History   Problem Relation Age of Onset    Diabetes Mother     Hypertension Mother     Heart Disease Father     Cancer Father     Diabetes Father     Other Father         MAC    Diabetes Paternal Aunt     Diabetes Maternal Grandmother     Thyroid Cancer Maternal Grandmother     Kidney Disease Maternal Grandmother     Arthritis Maternal Grandmother     Heart Disease Maternal Grandfather     High Cholesterol Maternal Grandfather     Hypertension Maternal Grandfather     Diabetes Paternal Grandmother     Hemophilia Paternal Grandfather      Social History     Tobacco Use    Smoking status: Former     Packs/day: 0.00     Years: 14.00     Pack years: 0.00     Types: Cigarettes     Quit date: 2014     Years since quittin.9    Smokeless tobacco: Never   Substance Use Topics    Alcohol use: No      Allergies   Allergen Reactions    Gabapentin Swelling     Of feet    Morphine Nausea and Vomiting    Penicillin G Swelling    Percocet [Oxycodone-Acetaminophen] Hives and Nausea and Vomiting     Pt is allergic to Oxycodone, has previously tolerated Tylenol and Hydrocodone.     Sulfa (Sulfonamide Antibiotics) Swelling    Tramadol Nausea Only     Nausea and headache       Current Facility-Administered Medications   Medication Dose Route Frequency    furosemide (LASIX) 200 mg in 0.9% sodium chloride 100 mL infusion  10 mg/hr IntraVENous CONTINUOUS    vancomycin (VANCOCIN) 1250 mg in  ml infusion  1,250 mg IntraVENous Q12H    metoclopramide HCl (REGLAN) injection 5 mg  5 mg IntraVENous Q6H    nitroGLYcerin (Tridil) 200 mcg/ml infusion  0-200 mcg/min IntraVENous TITRATE    enoxaparin (LOVENOX) injection 30 mg  30 mg SubCUTAneous Q12H    cefepime (MAXIPIME) 2 g in 0.9% sodium chloride (MBP/ADV) 100 mL MBP  2 g IntraVENous Q8H    metroNIDAZOLE (FLAGYL) IVPB premix 500 mg  500 mg IntraVENous Q12H    dexmedeTOMidine in 0.9 % NaCl (PRECEDEX) 400 mcg/100 mL (4 mcg/mL) infusion soln  0.1-1.5 mcg/kg/hr IntraVENous TITRATE    fentaNYL (PF) 1,500 mcg/30 mL (50 mcg/mL) infusion  0-200 mcg/hr IntraVENous TITRATE    [Held by provider] epoprostenol (VELETRI) 30 mcg/mL in 0.9% sodium chloride 50 mL inhalation solution  10 ng/kg/min (Ideal) Inhalation CONTINUOUS    propofol (DIPRIVAN) 10 mg/mL infusion  0-50 mcg/kg/min IntraVENous TITRATE    0.9% sodium chloride infusion  10 mL/hr IntraVENous CONTINUOUS    HYDROcodone-acetaminophen (NORCO) 5-325 mg per tablet 1 Tablet  1 Tablet Oral Q4H PRN    HYDROcodone-acetaminophen (NORCO) 7.5-325 mg per tablet 1 Tablet  1 Tablet Oral Q4H PRN    HYDROmorphone (DILAUDID) injection 1 mg  1 mg IntraVENous Q4H PRN    HYDROmorphone (DILAUDID) injection 0.5 mg  0.5 mg IntraVENous Q4H PRN    acetaminophen (TYLENOL) tablet 500 mg  500 mg Oral Q6H PRN    niCARdipine (CARDENE) 25 mg in 0.9% sodium chloride 250 mL ([id number])  5-15 mg/hr IntraVENous TITRATE    sodium chloride (NS) flush 5-40 mL  5-40 mL IntraVENous Q8H    sodium chloride (NS) flush 5-40 mL  5-40 mL IntraVENous PRN    ondansetron (ZOFRAN) injection 4 mg  4 mg IntraVENous PRN    bacitracin 500 unit/gram packet 1 Packet  1 Packet Topical PRN    0.45% sodium chloride infusion  10 mL/hr IntraVENous CONTINUOUS    naloxone (NARCAN) injection 0.4 mg  0.4 mg IntraVENous PRN    [Held by provider] amiodarone (CORDARONE) tablet 400 mg  400 mg Oral BID    ondansetron (ZOFRAN) injection 4 mg  4 mg IntraVENous Q4H PRN    albuterol (PROVENTIL VENTOLIN) nebulizer solution 2.5 mg  2.5 mg Nebulization Q4H PRN    aspirin chewable tablet 81 mg  81 mg Oral DAILY    midazolam (VERSED) injection 1 mg  1 mg IntraVENous Q1H PRN    chlorhexidine (PERIDEX) 0.12 % mouthwash 10 mL  10 mL Oral BID    magnesium oxide (MAG-OX) tablet 400 mg  400 mg Oral BID    calcium chloride 1 g in 0.9% sodium chloride 250 mL IVPB  1 g IntraVENous PRN    polyethylene glycol (MIRALAX) packet 17 g  17 g Oral DAILY    senna-docusate (PERICOLACE) 8.6-50 mg per tablet 1 Tablet  1 Tablet Oral DAILY    ELECTROLYTE REPLACEMENT NOTE: Nurse to review Serum Potassium and Magnesuim levels and Initiate Electrolyte Replacement Protocol as needed  1 Each Other PRN    magnesium sulfate 1 g/100 ml IVPB (premix or compounded)  1 g IntraVENous PRN    glucose chewable tablet 16 g  4 Tablet Oral PRN    glucagon (GLUCAGEN) injection 1 mg  1 mg IntraMUSCular PRN    insulin lispro (HUMALOG) injection   SubCUTAneous AC&HS    insulin glargine (LANTUS) injection 1-50 Units  1-50 Units SubCUTAneous ONCE OVER 24 HOURS    lactated ringers bolus infusion 250 mL  250 mL IntraVENous Q1H PRN    dextrose 10 % infusion 0-250 mL  0-250 mL IntraVENous PRN    melatonin tablet 3-6 mg  3-6 mg Oral QHS PRN    EPINEPHrine (ADRENALIN) 5 mg in 0.9% sodium chloride 250 mL infusion  0-10 mcg/min IntraVENous TITRATE    lidocaine 4 % patch 2 Patch  2 Patch TransDERmal Q24H    insulin regular (NOVOLIN R, HUMULIN R) 100 Units in 0.9% sodium chloride 100 mL infusion  1-25 Units/hr IntraVENous TITRATE    levothyroxine (SYNTHROID) tablet 125 mcg  125 mcg Oral 6am    pantoprazole (PROTONIX) tablet 40 mg  40 mg Oral ACB    LORazepam (ATIVAN) tablet 1 mg  1 mg Oral Q8H PRN    rosuvastatin (CRESTOR) tablet 40 mg  40 mg Oral DAILY    sertraline (ZOLOFT) tablet 100 mg  100 mg Oral DAILY    busPIRone (BUSPAR) tablet 15 mg  15 mg Oral TID        Review of Systems:  14pt ROS neg except per HPI      Objective:     Physical Exam:  Visit Vitals  BP (!) (P) 100/49 (BP 1 Location: Right lower arm, BP Patient Position: At rest;Lying)   Pulse 73   Temp 99.9 °F (37.7 °C)   Resp 20   Ht 5' 9\" (1.753 m)   Wt 124.5 kg (274 lb 7.6 oz)   SpO2 96%   BMI 40.53 kg/m²      General: critically ill. No distress.   Skin:  Extremities and face reveal no rashes. No hussein erythema. Median sternotomy incision c/d/I healing. HEENT: Sclerae anicteric. ETT and NGT in place. No abnormal pigmentation of the lips. Cardiovascular: Regular rate and rhythm. No appreciable murmur. Respiratory:  ventilatory breath sounds clear. Symmetric chest rise  GI:  Obese. Soft. Nondistended. Unable to appreciate HSM due to obesity. Normal active bowel sounds. Rectal:  Deferred  Musculoskeletal:  No pitting edema of the lower legs. Normal bulk. Neurological:  heavily sedated. Psychiatric:  critically ill intubated unable to assess. Lymphatic:  No cervical or supraclavicular adenopathy. Laboratory:    Lab Results   Component Value Date/Time    WBC 13.1 (H) 11/27/2022 04:25 AM    HGB 7.7 (L) 11/27/2022 04:25 AM    HCT 24.2 (L) 11/27/2022 04:25 AM    PLATELET 759 (L) 80/19/2886 04:25 AM    MCV 85.8 11/27/2022 04:25 AM     Lab Results   Component Value Date/Time    INR 1.1 11/22/2022 03:13 PM    Prothrombin time 11.2 (H) 11/22/2022 03:13 PM     Lab Results   Component Value Date/Time    Sodium 138 11/27/2022 04:25 AM    Potassium 4.6 11/27/2022 04:25 AM    Chloride 105 11/27/2022 04:25 AM    CO2 28 11/27/2022 04:25 AM    Anion gap 5 11/27/2022 04:25 AM    Glucose 121 (H) 11/27/2022 04:25 AM    BUN 45 (H) 11/27/2022 04:25 AM    Creatinine 1.15 11/27/2022 04:25 AM    BUN/Creatinine ratio 39 (H) 11/27/2022 04:25 AM    GFR est AA >60 09/14/2022 09:54 AM    GFR est non-AA >60 09/14/2022 09:54 AM    Calcium 7.6 (L) 11/27/2022 04:25 AM    Bilirubin, total 1.1 (H) 11/27/2022 04:25 AM    Alk.  phosphatase 279 (H) 11/27/2022 04:25 AM    Protein, total 5.4 (L) 11/27/2022 04:25 AM    Albumin 2.7 (L) 11/27/2022 04:25 AM    Globulin 2.7 11/27/2022 04:25 AM    A-G Ratio 1.0 (L) 11/27/2022 04:25 AM    ALT (SGPT) 1,137 (H) 11/27/2022 04:25 AM    AST (SGOT) >2,000 (H) 11/27/2022 04:25 AM     US Results (most recent):  Results from [place] encounter on 11/15/22    US ABD COMP    Narrative  EXAM: Ultrasound of the abdomen. INDICATION:  elevated LFTts    TECHNIQUE: High-resolution grayscale and selected color flow/Doppler evaluation  of the abdomen performed. COMPARISON:  CT 4/6/2022. FINDINGS:    Liver:  Diffusely increased in echogenicity left lobe partially obscured by  overlying bandages and treatment apparatus. No focal lesions. Gallbladder:  Surgically absent. Bile ducts:  Normal.  CBD 3 mm. No intrahepatic biliary dilation. Spleen:  Enlarged measuring 5.3 x 12.4 x 14.5 cm for estimated volume of 499 mL. No focal lesion. Kidneys:  Normal with no stone, mass, or hydronephrosis. Kidneys measure 10.8  cm on the right and 11.8 cm on the left. Pancreas:  Obscured and not well evaluated. Abdominal aorta:  Obscured and not well evaluated. IVC:  Visualized portions normal.    Misc:  No pleural effusion or ascites identified. Impression  Increased hepatic echogenicity compatible with diffuse  hepatocellular process, most commonly seen in steatosis. 11/15/22    ECHO ADULT COMPLETE 11/25/2022 11/25/2022    Interpretation Summary    Left Ventricle: Normal left ventricular systolic function with a visually estimated EF of 55 - 60%. Left ventricle size is normal. Increased wall thickness. Normal wall motion. Right Ventricle: Not well visualized. Right ventricle size is normal.    Aortic Valve: Bioprosthetic valve. Tricuspid Valve: The estimated RVSP is 27 mmHg. Technical qualifiers: Echo study was technically difficult due to patient's body habitus. Signed by: Jonathon Patino MD on 11/25/2022  2:01 PM          Assessment/Plan:     Active Problems:    Acute non-Q wave non-ST elevation myocardial infarction (NSTEMI) ([address] (11/15/2022)      Aortic stenosis (11/22/2022)      CAD (coronary artery disease) (11/22/2022)      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)      Overview:  On pump CORONARY ARTERY BYPASS GRAFT  X 1 WITH LEFT INTERNAL MAMMARY       ARTERY to LAD      AORTIC VALVE REPLACEMENT WITH MEYER 25MM INSPIRIS RESILIA TISSUE VALVE       REPAIR OF ASCENDING AORTIC ANEURYSM with 26mm hemashield graft      S/P AVR (aortic valve replacement) and aortoplasty (11/22/2022)      Overview: On pump CORONARY ARTERY BYPASS GRAFT  X 1 WITH LEFT INTERNAL MAMMARY       ARTERY to LAD      AORTIC VALVE REPLACEMENT WITH MEYER 25MM INSPIRIS RESILIA TISSUE VALVE       REPAIR OF ASCENDING AORTIC ANEURYSM with 26mm hemashield graft         Mr. Anthony Phillips is a 43yo with T2DM on insulin A1C 9-10, hypothyroidism on LT4, s/p on-pump CABG 11/22/2022 with tissue aortic valve replacement and ascending aortic aneurysm repair due to bicuspic AV, COVID positive rapic PCR negative 11/23, worsening hypoxemic respiratory failure s/p reintubation 11/24/2022 now with worsening PEEP and FiO2 requirements, severe SERGE 11/24 Cr of 2 now 1.33, with acute hepatitis. Reviewed all catheterization and operative notes since admission. US today with hepatomegaly with diffuse echocenicity limited views, as well as splenomegaly. CT 4/2022 with normal appearing liver and spleen. 11/15/2022, and even September and April of 2022 and 12/2021 with normal LFTs. Since 11/22pm he has had progressive worsening of transaminitis in the 100-300range ALT, 260-350 AST with intermittent mild hyperbilirubinemia of 1.1-1.3.     11/26/2022 LFTs alb 2.7, Tb 1.2, , ,   11/27/2022 LFTs alb 2.7, Tb 1.1, ALT 1137, AST >2000,     He has had mild thrombmocytopenia ranging 144-183 throughout 2022 and the trough was 107 on 11/24. No INR since 11/22 and it was normal at 1.1. This is a critical hepatitis, but his liver function appears intact so far. Usually if ischemic from surgery, the peak is sooner after the event then downtrends. He did have some mild worsening hepatitis which peaked 11/24 and was down trending slowly. He has an acute critical hepatitis insult.  Unlikely to be an acute viral infection, but will send studies. Main concern is an acute thrombosis or severe congestion now that he has splenomegaly and diffuse congestion in liver. Reviewed MAR at length for the past 10 days. Minimal use of tylenol, as well as amiodarone started 11/23. He has been on continuous high-intensity statin rosuvastatin 40mg daily, recommend temporary holding of this. If possible, recommend holding amiodarone. I do recommend empiric NAC protocol. Will defer to ICU team to order as there may be issues with volume overload. I strongly recommend a stat CT triple phase of liver and abdomen to assess for thromboses of portal vein, hepatic veins, and assess for acute ischemia of liver. Will also send LDH and peripheral smear for possible DIC and help assess viral vs ischemic etiologies. Will send serologies for viral hepatitis infections, including hepatitis D superinfection and HSV (strongly doubt), but also CMV and EBV. Will send autoimmune hepatitis studies. Again, I doubt this is a cause. Awaiting INR, and in this instance without a history of cirrhosis, will send ammonia level off the A-line placed immediately on ice. Needs daily INR and LFTs. There is a preliminary read of limited TTE by Dr. Davalos Salvage today - normal appearing R heart function and trace pericardial effusion. That is good news. His mean PAP on Bryan catheter has been slowly increasing the past few days and is >30. If cardiology does not believe cardiac congestion from elevated PAPs, and if can not get CT angiography, recommend discuss with IR for transjugular pressure measurements. If the wedge / hepatic vein pressures show elevation or stenosis that would be concerning for budd chiari and would need IR intervention. If the wedge is normal he could still have a portal vein thrombus. Preferable to get the CT triple phase. Discussed at length with ICU team.    Prognosis is guarded.     Critical care time 95 minutes.

## 2022-11-27 NOTE — PROGRESS NOTES
Physical Therapy  Chart reviewed. Patient remains intubated and sedated. Will defer therapy and continue to follow as appropriate.   Thank you,  Ricardo Leyva, PT

## 2022-11-27 NOTE — PROGRESS NOTES
11/27/22 0740   ABCDEF Bundle   SBT Safety Screen Passed No   SBT Screen Reason for Failure FiO2 > 50%;PEEP > 7.5

## 2022-11-27 NOTE — PROGRESS NOTES
Reviewed LFT's : very high AST and ALT  Suspect hepatic ischemia/infarction   Spoke with US tech : will not be able get good view to be able to doppler the hepatic artery and portal vein  Patient is not stable for CTA of abdomen   Consulted GI

## 2022-11-27 NOTE — PROGRESS NOTES
Critical Care Progress Note  Elly Romo MD          Date of Service:  2022  NAME:  Orlanod Perez  :  1980  MRN:  [id number]      Subjective/Hospital course:      37 y/o male POD 1 from CABG x1 an AV valve replacement. He arrived intubated and on pressors. Extubated  on POD 0 @ 19:00. Noted to have SERGE with creatinine rise 2. Possibly secondary to hypotension in OR. Night of  was worked up for hypoxemia. This morning he tested positive for Covid. Case discussed with cT surgery. Most likely diagnosis is PE most likely secondary to hypercoaguable state from Covid. Pt had no respiratory symptoms preop or post op suggestive of URI      - Overnight developed hypoxemia. Pt had bronch and SYLWIA neither of which explained cause for hypoxemia. He required re-intubation and was put on veletri.  - intermittent desaturation events.   CXR this a.m. with pulmonary edema   - fio2 improved   : fio2 and PEEP requirement has increased, still requiring epi at 2 mcg/min        Assessment/Plan:     PULMONARY:  Acute hypoxic resopiratory failure   Covid rapid +, PCR negative  D dimer / LE doppler negative - unlikely PE  Pulmonary edema/ pleural effusions    Plan  - stop heparin - discussed with CTS  - Vent settings established, reviewed and/or adjusted as per orders  - Continue nebulized bronchodilators and steroids  - stat lasix drip today    CARDIOVASCULAR/HEMODYNAMIC:  Volume overload  Suspect some degree of RV failue  Unable to visualize on echo but PA pressures elevated on monitor   >7L positive for hospital stay         Current vasopressors: Epi 2     Plan  - continue lasix : switch to lasix drip  - ICU hemodynamic/cardiac monitoring  - MAP goal > 65 mmHg        RENAL:  SERGE    Plan  - Monitor I/Os and Uo  - Monitor renal function panel intermittently  - Correct electrolytes as needed  - Avoid nephrotoxic meds      GI/NUTRITION:  Abdomen distended today : hold trickle feeds  Obtain KUB  Bedside US of abdomen  Amylase and Lipase  Start lactulose as part of bowel regimen    INFECTIOUS DISEASE  Covid ruled out with 2 negative PCR  Intermittent fevers  Cultures pending       Micro:    Plan  - Continue broad spectrum abx       NEURO  Sedation for vent synchrony    Plan    - Daily SAT when meets ICU criteria  - ABCDEF mobility bundle  - PT/OT involvement when appropriate          Care Plan discussed with: attending, CTS NP and bedside nurse    I personally spent 45 minutes of critical care time. This is time spent at this critically ill patient's bedside actively involved in patient care as well as the coordination of care and discussions with the patient's family. This does not include any procedural time which has been billed separately. Review of Systems:   ROS   Intubated     Vital Signs:   Patient Vitals for the past 4 hrs:   BP Temp Pulse Resp SpO2   11/27/22 0931 -- 97.5 °F (36.4 °C) 69 -- 95 %   11/27/22 0831 -- 96.8 °F (36 °C) 68 20 93 %   11/27/22 0731 -- -- 67 20 93 %   11/27/22 0700 (!) 119/58 (!) 95.9 °F (35.5 °C) 66 20 93 %          Intake/Output Summary (Last 24 hours) at 11/27/2022 1035  Last data filed at 11/27/2022 0841  Gross per 24 hour   Intake 3197.58 ml   Output 4265 ml   Net -1067.42 ml          Physical Examination:    Young, well built  HEENT: normal  Heart: s1,s2  Lungs: clear  Abd: distended, no bowel sounds  Ext: no edema  Cns: sedated     Labs and Imaging:   Reviewed.       Medications:     Current Facility-Administered Medications   Medication Dose Route Frequency    lactated ringers bolus infusion 500 mL  500 mL IntraVENous ONCE    hydrALAZINE (APRESOLINE) 20 mg/mL injection 20 mg  20 mg IntraVENous ONCE    furosemide (LASIX) 200 mg in 0.9% sodium chloride 100 mL infusion  10 mg/hr IntraVENous CONTINUOUS    lactulose (CHRONULAC) 10 gram/15 mL solution 45 mL  30 g Oral ONCE    vancomycin (VANCOCIN) 1250 mg in  ml infusion  1,250 mg IntraVENous Q12H    nitroGLYcerin (Tridil) 200 mcg/ml infusion  0-200 mcg/min IntraVENous TITRATE    enoxaparin (LOVENOX) injection 30 mg  30 mg SubCUTAneous Q12H    cefepime (MAXIPIME) 2 g in 0.9% sodium chloride (MBP/ADV) 100 mL MBP  2 g IntraVENous Q8H    metroNIDAZOLE (FLAGYL) IVPB premix 500 mg  500 mg IntraVENous Q12H    dexmedeTOMidine in 0.9 % NaCl (PRECEDEX) 400 mcg/100 mL (4 mcg/mL) infusion soln  0.1-1.5 mcg/kg/hr IntraVENous TITRATE    fentaNYL (PF) 1,500 mcg/30 mL (50 mcg/mL) infusion  0-200 mcg/hr IntraVENous TITRATE    [Held by provider] epoprostenol (VELETRI) 30 mcg/mL in 0.9% sodium chloride 50 mL inhalation solution  10 ng/kg/min (Ideal) Inhalation CONTINUOUS    propofol (DIPRIVAN) 10 mg/mL infusion  0-50 mcg/kg/min IntraVENous TITRATE    0.9% sodium chloride infusion  10 mL/hr IntraVENous CONTINUOUS    HYDROcodone-acetaminophen (NORCO) 5-325 mg per tablet 1 Tablet  1 Tablet Oral Q4H PRN    HYDROcodone-acetaminophen (NORCO) 7.5-325 mg per tablet 1 Tablet  1 Tablet Oral Q4H PRN    HYDROmorphone (DILAUDID) injection 1 mg  1 mg IntraVENous Q4H PRN    HYDROmorphone (DILAUDID) injection 0.5 mg  0.5 mg IntraVENous Q4H PRN    acetaminophen (TYLENOL) tablet 500 mg  500 mg Oral Q6H PRN    niCARdipine (CARDENE) 25 mg in 0.9% sodium chloride 250 mL ([id number])  5-15 mg/hr IntraVENous TITRATE    sodium chloride (NS) flush 5-40 mL  5-40 mL IntraVENous Q8H    sodium chloride (NS) flush 5-40 mL  5-40 mL IntraVENous PRN    ondansetron (ZOFRAN) injection 4 mg  4 mg IntraVENous PRN    bacitracin 500 unit/gram packet 1 Packet  1 Packet Topical PRN    0.45% sodium chloride infusion  10 mL/hr IntraVENous CONTINUOUS    naloxone (NARCAN) injection 0.4 mg  0.4 mg IntraVENous PRN    [Held by provider] amiodarone (CORDARONE) tablet 400 mg  400 mg Oral BID    ondansetron (ZOFRAN) injection 4 mg  4 mg IntraVENous Q4H PRN    albuterol (PROVENTIL VENTOLIN) nebulizer solution 2.5 mg  2.5 mg Nebulization Q4H PRN    aspirin chewable tablet 81 mg  81 mg Oral DAILY    midazolam (VERSED) injection 1 mg  1 mg IntraVENous Q1H PRN    chlorhexidine (PERIDEX) 0.12 % mouthwash 10 mL  10 mL Oral BID    magnesium oxide (MAG-OX) tablet 400 mg  400 mg Oral BID    calcium chloride 1 g in 0.9% sodium chloride 250 mL IVPB  1 g IntraVENous PRN    polyethylene glycol (MIRALAX) packet 17 g  17 g Oral DAILY    senna-docusate (PERICOLACE) 8.6-50 mg per tablet 1 Tablet  1 Tablet Oral DAILY    ELECTROLYTE REPLACEMENT NOTE: Nurse to review Serum Potassium and Magnesuim levels and Initiate Electrolyte Replacement Protocol as needed  1 Each Other PRN    magnesium sulfate 1 g/100 ml IVPB (premix or compounded)  1 g IntraVENous PRN    glucose chewable tablet 16 g  4 Tablet Oral PRN    glucagon (GLUCAGEN) injection 1 mg  1 mg IntraMUSCular PRN    insulin lispro (HUMALOG) injection   SubCUTAneous AC&HS    insulin glargine (LANTUS) injection 1-50 Units  1-50 Units SubCUTAneous ONCE OVER 24 HOURS    lactated ringers bolus infusion 250 mL  250 mL IntraVENous Q1H PRN    dextrose 10 % infusion 0-250 mL  0-250 mL IntraVENous PRN    melatonin tablet 3-6 mg  3-6 mg Oral QHS PRN    EPINEPHrine (ADRENALIN) 5 mg in 0.9% sodium chloride 250 mL infusion  0-10 mcg/min IntraVENous TITRATE    lidocaine 4 % patch 2 Patch  2 Patch TransDERmal Q24H    insulin regular (NOVOLIN R, HUMULIN R) 100 Units in 0.9% sodium chloride 100 mL infusion  1-25 Units/hr IntraVENous TITRATE    levothyroxine (SYNTHROID) tablet 125 mcg  125 mcg Oral 6am    pantoprazole (PROTONIX) tablet 40 mg  40 mg Oral ACB    LORazepam (ATIVAN) tablet 1 mg  1 mg Oral Q8H PRN    rosuvastatin (CRESTOR) tablet 40 mg  40 mg Oral DAILY    sertraline (ZOLOFT) tablet 100 mg  100 mg Oral DAILY    busPIRone (BUSPAR) tablet 15 mg  15 mg Oral TID     ______________________________________________________________________  EXPECTED LENGTH OF STAY: 1d 19h  ACTUAL LENGTH OF STAY:          12                 Juan Manuel Alas MD Pulmonary/[facility]  [address] [facility]

## 2022-11-27 NOTE — PROGRESS NOTES
[facility] ICU Progress Note    Admit Date: 11/15/2022  POD:  5 Day Post-Op    Procedure:  Procedure(s):  SYLWIA BY DR Keren Kumar -  CORONARY ARTERY BYPASS GRAFT  X 1 WITH LEFT INTERNAL MAMMARY ARTERY GRAFTING - AORTIC VALVE REPLACEMENT WITH MEYER 25MM INSPIRIS RESILIA TISSUE VALVE AND REPAIR OF ASCENDING AORTIC ANEURYSM        Subjective/Interval:   Pt seen with Dr. Tae Oliver. Patient remains intubated. Now on 90% FiO2 and 12 of PEEP. CXR this am with decreasing pulmonary edema. CT output 150cc/24, no air leak. Tm over the last 24 hours 101.7. He was on Epi @ 2 this am, Mv02 58% this am, now 43% CO/CI 5.7/2.5 this am, now 4.0/1.5. Creatinine is improving 1.15 this am, good UOP 3.8cc/24. COVID PCR (-)X2, Cultures NTD, current temp 101.8. WBC 13.1. Acute rise in LFTs AST >2000, ALT 1137 Alk phos 279, amylase, lipase. ABD US ordered and GI was consulted. KUB without ileus. Objective:   Vitals:  Blood pressure (!) 100/53, pulse 80, temperature (!) 101.7 °F (38.7 °C), resp. rate 20, height 5' 9\" (1.753 m), weight 274 lb 7.6 oz (124.5 kg), SpO2 93 %.   Temp (24hrs), Av.7 °F (37.6 °C), Min:95.4 °F (35.2 °C), Max:102.7 °F (39.3 °C)      Hemodynamics:   CO: CO (l/min): 4.2 l/min   CI: CI (l/min/m2): 1.8 l/min/m2   CVP: CVP (mmHg): 23 mmHg (22 1520)   SVR: SVR (dyne*sec)/cm5: 749 (dyne*sec)/cm5 (22 5104)   PAP Systolic: PAP Systolic: 44 (67/45/94 4100)   PAP Diastolic: PAP Diastolic: 26 (57/77/14 8385)   PVR:     SV02: SVO2 (%): 48 % (22 1520)   SCV02:      EKG/Rhythm:    EKG Results       Procedure 720 Value Units Date/Time    EKG, 12 LEAD, INITIAL [[id number]]     Order Status: Sent     EKG, 12 LEAD, INITIAL [[id number]] Collected: 22    Order Status: Completed Updated: 22 1017     Ventricular Rate 77 BPM      Atrial Rate 77 BPM      P-R Interval 164 ms      QRS Duration 82 ms      Q-T Interval 370 ms      QTC Calculation (Bezet) 418 ms      Calculated P Axis 49 degrees Calculated R Axis -6 degrees      Calculated T Axis 108 degrees      Diagnosis --     Normal sinus rhythm  Minimal voltage criteria for LVH, may be normal variant ( R in aVL )  Nonspecific ST and T wave abnormality  Poor R-wave Progression (consider lead placement or loss of anterior forces)    Confirmed by Huey Gaucher MD, Lukasz Gudino ([id number]) on 11/18/2022 10:17:13 AM      EKG, 12 LEAD, INITIAL [[id number]] Collected: 11/15/22 1728    Order Status: Completed Updated: 11/16/22 1136     Ventricular Rate 83 BPM      Atrial Rate 83 BPM      P-R Interval 160 ms      QRS Duration 84 ms      Q-T Interval 358 ms      QTC Calculation (Bezet) 420 ms      Calculated P Axis 50 degrees      Calculated R Axis 3 degrees      Calculated T Axis 126 degrees      Diagnosis --     Normal sinus rhythm  Left ventricular hypertrophy with repolarization abnormality  When compared with ECG of 14-SEP-2022 12:27,  No significant change was found  Confirmed by Hawa Ellington ([id number]) on 11/16/2022 11:35:53 AM              CT Output: 150 in 24 hrs    Ventilator:  Ventilator Volumes  Vt Set (ml): 450 ml (11/27/22 1213)  Vt Exhaled (Machine Breath) (ml): 475 ml (11/27/22 1213)  Vt Spont (ml): 455 ml (11/23/22 2349)  Ve Observed (l/min): 9.48 l/min (11/27/22 1213)        Oxygen Therapy:  Oxygen Therapy  O2 Sat (%): 93 % (11/27/22 1520)  Pulse via Oximetry: 80 beats per minute (11/27/22 1520)  O2 Device: Endotracheal tube (11/27/22 0831)  Skin Assessment: Clean, dry, & intact (11/27/22 1213)  Skin Protection for O2 Device: N/A (11/26/22 0400)  O2 Flow Rate (L/min): 6 l/min (11/24/22 1600)  FIO2 (%): 90 % (11/27/22 1213)    CXR:    CXR Results  (Last 48 hours)                 11/27/22 0413  XR CHEST PORT Final result    Impression:  Left lower lobe atelectasis or pneumonic infiltrate. Support lines   in expected positions as above. Narrative:  EXAM:  XR CHEST PORT       INDICATION: Postop. COMPARISON: Chest x-ray 11/26/2022. CT 11/16/2022. TECHNIQUE: Single portable chest AP view       FINDINGS: Endotracheal tube, enteric tube, and Fanshawe-Yuly catheter remain in   stable and expected position. There are median sternotomy wires. The cardiac   silhouette is within normal limits. The pulmonary vasculature is within normal   limits. The lungs and pleural spaces show left basilar opacity but otherwise are clear. The visualized bones and upper abdomen are age-appropriate. 11/26/22 0423  XR CHEST PORT Final result    Impression:  No interval change. Narrative:  INDICATION: ET tube placement. Portable AP view of the chest.       Direct comparison made to prior chest x-ray dated November 25, 2022. Cardiomediastinal silhouette is stable. Tubes and lines are unchanged. Lungs are   grossly clear bilaterally. Pleural spaces are normal and there is no   pneumothorax. Osseous structures are intact. KUB 11/27/2022:  IMPRESSION  Positive bowel gas limits evaluation of bowel loops with some  colonic gas and stool seen and no interval antegrade progression of gastric  luminal contrast material over the last 2 days. ABD US 11/27/2022:  IMPRESSION  Increased hepatic echogenicity compatible with diffuse  hepatocellular process, most commonly seen in steatosis. Admission Weight: Last Weight   Weight: 257 lb 15 oz (117 kg) Weight: 274 lb 7.6 oz (124.5 kg)     Intake / Output / Drain:  Current Shift: 11/27 0701 - 11/27 1900  In: 65   Out: 1325 [Urine:1325]  Last 24 hrs.:   Intake/Output Summary (Last 24 hours) at 11/27/2022 1622  Last data filed at 11/27/2022 1250  Gross per 24 hour   Intake 2817.58 ml   Output 3875 ml   Net -1057.42 ml         EXAM:  General: Intubated, sedated.     Lungs:    Coarse to auscultation bilaterally   Incision:  Incision clean, dry and intact and prineo intact   Heart:   Regular rate and rhythm  and no murmurs, rubs or gallops   Abdomen:    Distended, firm, hypoactive bowel sounds, and obese.   Extremities:  +1-2 pitting edema BLE, BUE   Neurologic:  Intubated, sedated. Labs:   Recent Labs     11/27/22  1558 11/27/22  0510 11/27/22  0425   WBC  --   --  13.1*   HGB  --   --  7.7*   HCT  --   --  24.2*   PLT  --   --  142*   NA  --   --  138   K  --   --  4.6   BUN  --   --  45*   CREA  --   --  1.15   GLU  --   --  121*   GLUCPOC 97   < >  --     < > = values in this interval not displayed. Assessment:     Active Problems:    Acute non-Q wave non-ST elevation myocardial infarction (NSTEMI) ([address] (11/15/2022)      Aortic stenosis (11/22/2022)      CAD (coronary artery disease) (11/22/2022)      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)      Overview: On pump CORONARY ARTERY BYPASS GRAFT  X 1 WITH LEFT INTERNAL MAMMARY       ARTERY to LAD      AORTIC VALVE REPLACEMENT WITH MEYER 25MM INSPIRIS RESILIA TISSUE VALVE       REPAIR OF ASCENDING AORTIC ANEURYSM with 26mm hemashield graft      S/P AVR (aortic valve replacement) and aortoplasty (11/22/2022)      Overview: On pump CORONARY ARTERY BYPASS GRAFT  X 1 WITH LEFT INTERNAL MAMMARY       ARTERY to LAD      AORTIC VALVE REPLACEMENT WITH MEYER 25MM INSPIRIS RESILIA TISSUE VALVE       REPAIR OF ASCENDING AORTIC ANEURYSM with 26mm hemashield graft       Plan/Recommendations/Medical Decision Making:     Severe symptomatic AS, s/p tissue AVR. Continue ASA. CAD, STEMI, s/p CABG. Continue ASA, amio, statin. Consider BB when off pressors. Now with poor CO/CI, MvO2 with MAP of 65, repeat Echo this am without effusion or tamponade, NL LV/RVF, Will titrate epi for map of 80, maintain CI greater than 2.0. May need to add dobutamine. Acute hypoxic respiratory failure: Patient developed hypoxia at approximately 11pm on 11/24 requiring Bipap and was ultimately re-intubated at 0200 for acute respiratory failure. Bronchoscopy was completed 11/24 and was unremarkable. SYLWIA was completed 11/24 with normal RV and LVF without effusion or shunting.  CTA was no completed due to SERGE. Heparin gtt for possible PE discontinued by [facility] yesterday.  -500cc/24, CXR unchanged, will transitioned to a Lasix gtt goal for 100cc net negative per hour. Continues on broad spectrum antibiotics. Acute kidney injury. Creatinine 2.01>1.79>1.23>1.33>1. 15. Nephrology consult appreciated, avoid nephrotoxins, trend. Acute blood loss anemia: H&H 7.7/24.2. Given poor hemodynamics will transfuse 1 unit of PRBC. Transaminitis. Acute Hepatitis, GI consult appreciated, patient now with hepatomegaly and splenomegaly, ? For possible acute thrombosis, will resume heparin gtt now. Recommendations for DT triple phase of the liver and ABD however the patient is not stable for transport. Viral panel, LDH pending. TF stopped for now. Leukocytosis. WBC 20.6>18.6>17.1>11.7>13.1. febrile, tm 101.8 today, now on Flagyl; vanco, and cefepime, BC, Sputum and urine cultures NTD. Will consult ID, repeat BC, cooling blanket. HTN. On ACEI, BB PTA; resume BB when appropriate. HLD. Continue statin. WALLY , not on CPAP. Was unable to tolerate due to anxiety; he has been working on this with Sleep Medicine. OP follow-up. DMII. On Toujeo at home. Repeat A1C 9.0- Insulin gtt per protocol. Diabetes management following. Hypothyroidism. On Synthroid PTA; continue. Repeat TSH 1.96. GERD. Continue PPI. Anxiety and depression. On Buspar, Zoloft; continue. Supportive care. Nutrition: trickle TF today.       DVT ppx- SCDs  Dispo- ICU today       Signed By: Dequan Craft PA-C

## 2022-11-27 NOTE — PROGRESS NOTES
1900: Bedside shift change report given to KG Parks (oncoming nurse) by Zuleyka Tomas RN (offgoing nurse). Report included the following information SBAR, Kardex, Intake/Output, MAR, Recent Results, and Cardiac Rhythm NSR .     2000: Assessment completed. T 102.4. Cooling blanket turned on and tylenol given by previous shift. Patient currently in bilateral wrist restraints. Mouth care completed and patient repositioned for comfort. See MAR and flowsheet for more details. 2200: CI dropped to 1.9. Epi increased to 2 mcg/min. 0030: Patient with frequent ectopy. Labs sent. Cardene gtt started at 2.5 mg/hr to maintain SBP <160.     0145: Patient becoming agitated hitting hands against side rails. BP elevated. Propofol increased to 20 mcg/kg/min.     0400: Reassessment completed. 0700: Bedside shift change report given to Stephanie Soriano RN (oncoming nurse) by KG Parks (offgoing nurse). Report included the following information SBAR, Kardex, Intake/Output, MAR, Recent Results, and Cardiac Rhythm NSR .

## 2022-11-27 NOTE — PROGRESS NOTES
Cardiology Progress Note  11/27/2022  Admit Date: 11/15/2022  Admit Diagnosis/CC: Acute non-Q wave non-ST elevation myocardial infarction (NSTEMI) ([facility]) [I21.4]  Aortic stenosis [I35.0]  CAD (coronary artery disease) [I25.10]    Subjective:   Patient reports:  He cannot communicate due to intubation/sedation. COVID positive. Increasing oxygen requirement. Chest Pain:  [] none,  consistent with [] non-cardiac  [] atypical  []  anginal chest pain             [] none now    []  on-going  Dyspnea: [] none  [] at rest  [] with exertion  [] improved  [] unchanged [] worsening  PND:       [] none  [] overnight   [] current  Orthopnea: [] none  [] improved  [] unchanged  [] worsening  Presyncope: [] none  [] improved  [] unchanged  [] worsening  Ambulated in hallway without symptoms  [] Yes  Ambulated in room without symptoms  [] Yes  ROS(2+other systems)   Hematuria: [] Yes  [] No.   Dysuria: [] Yes  [] No                                           Cough:       [] Yes  [] No.   Sputum: [] Yes  [] No                                            Hematochezia: [] Yes  [] No.   Melena: [] Yes  [] No                                            No change in family and social history from H&P/Consult note.     Current Facility-Administered Medications   Medication Dose Route Frequency    lactated ringers bolus infusion 500 mL  500 mL IntraVENous ONCE    hydrALAZINE (APRESOLINE) 20 mg/mL injection 20 mg  20 mg IntraVENous ONCE    furosemide (LASIX) 200 mg in 0.9% sodium chloride 100 mL infusion  10 mg/hr IntraVENous CONTINUOUS    vancomycin (VANCOCIN) 1250 mg in  ml infusion  1,250 mg IntraVENous Q12H    nitroGLYcerin (Tridil) 200 mcg/ml infusion  0-200 mcg/min IntraVENous TITRATE    enoxaparin (LOVENOX) injection 30 mg  30 mg SubCUTAneous Q12H    cefepime (MAXIPIME) 2 g in 0.9% sodium chloride (MBP/ADV) 100 mL MBP  2 g IntraVENous Q8H    metroNIDAZOLE (FLAGYL) IVPB premix 500 mg  500 mg IntraVENous Q12H    dexmedeTOMidine in 0.9 % NaCl (PRECEDEX) 400 mcg/100 mL (4 mcg/mL) infusion soln  0.1-1.5 mcg/kg/hr IntraVENous TITRATE    fentaNYL (PF) 1,500 mcg/30 mL (50 mcg/mL) infusion  0-200 mcg/hr IntraVENous TITRATE    [Held by provider] epoprostenol (VELETRI) 30 mcg/mL in 0.9% sodium chloride 50 mL inhalation solution  10 ng/kg/min (Ideal) Inhalation CONTINUOUS    propofol (DIPRIVAN) 10 mg/mL infusion  0-50 mcg/kg/min IntraVENous TITRATE    0.9% sodium chloride infusion  10 mL/hr IntraVENous CONTINUOUS    HYDROcodone-acetaminophen (NORCO) 5-325 mg per tablet 1 Tablet  1 Tablet Oral Q4H PRN    HYDROcodone-acetaminophen (NORCO) 7.5-325 mg per tablet 1 Tablet  1 Tablet Oral Q4H PRN    HYDROmorphone (DILAUDID) injection 1 mg  1 mg IntraVENous Q4H PRN    HYDROmorphone (DILAUDID) injection 0.5 mg  0.5 mg IntraVENous Q4H PRN    acetaminophen (TYLENOL) tablet 500 mg  500 mg Oral Q6H PRN    niCARdipine (CARDENE) 25 mg in 0.9% sodium chloride 250 mL ([id number])  5-15 mg/hr IntraVENous TITRATE    sodium chloride (NS) flush 5-40 mL  5-40 mL IntraVENous Q8H    sodium chloride (NS) flush 5-40 mL  5-40 mL IntraVENous PRN    ondansetron (ZOFRAN) injection 4 mg  4 mg IntraVENous PRN    bacitracin 500 unit/gram packet 1 Packet  1 Packet Topical PRN    0.45% sodium chloride infusion  10 mL/hr IntraVENous CONTINUOUS    naloxone (NARCAN) injection 0.4 mg  0.4 mg IntraVENous PRN    [Held by provider] amiodarone (CORDARONE) tablet 400 mg  400 mg Oral BID    ondansetron (ZOFRAN) injection 4 mg  4 mg IntraVENous Q4H PRN    albuterol (PROVENTIL VENTOLIN) nebulizer solution 2.5 mg  2.5 mg Nebulization Q4H PRN    aspirin chewable tablet 81 mg  81 mg Oral DAILY    midazolam (VERSED) injection 1 mg  1 mg IntraVENous Q1H PRN    chlorhexidine (PERIDEX) 0.12 % mouthwash 10 mL  10 mL Oral BID    magnesium oxide (MAG-OX) tablet 400 mg  400 mg Oral BID    calcium chloride 1 g in 0.9% sodium chloride 250 mL IVPB  1 g IntraVENous PRN    polyethylene glycol (MIRALAX) packet 17 g  17 g Oral DAILY    senna-docusate (PERICOLACE) 8.6-50 mg per tablet 1 Tablet  1 Tablet Oral DAILY    ELECTROLYTE REPLACEMENT NOTE: Nurse to review Serum Potassium and Magnesuim levels and Initiate Electrolyte Replacement Protocol as needed  1 Each Other PRN    magnesium sulfate 1 g/100 ml IVPB (premix or compounded)  1 g IntraVENous PRN    glucose chewable tablet 16 g  4 Tablet Oral PRN    glucagon (GLUCAGEN) injection 1 mg  1 mg IntraMUSCular PRN    insulin lispro (HUMALOG) injection   SubCUTAneous AC&HS    insulin glargine (LANTUS) injection 1-50 Units  1-50 Units SubCUTAneous ONCE OVER 24 HOURS    lactated ringers bolus infusion 250 mL  250 mL IntraVENous Q1H PRN    dextrose 10 % infusion 0-250 mL  0-250 mL IntraVENous PRN    melatonin tablet 3-6 mg  3-6 mg Oral QHS PRN    EPINEPHrine (ADRENALIN) 5 mg in 0.9% sodium chloride 250 mL infusion  0-10 mcg/min IntraVENous TITRATE    lidocaine 4 % patch 2 Patch  2 Patch TransDERmal Q24H    insulin regular (NOVOLIN R, HUMULIN R) 100 Units in 0.9% sodium chloride 100 mL infusion  1-25 Units/hr IntraVENous TITRATE    levothyroxine (SYNTHROID) tablet 125 mcg  125 mcg Oral 6am    pantoprazole (PROTONIX) tablet 40 mg  40 mg Oral ACB    LORazepam (ATIVAN) tablet 1 mg  1 mg Oral Q8H PRN    rosuvastatin (CRESTOR) tablet 40 mg  40 mg Oral DAILY    sertraline (ZOLOFT) tablet 100 mg  100 mg Oral DAILY    busPIRone (BUSPAR) tablet 15 mg  15 mg Oral TID       Objective:    Physical Exam:  Last VS: Visit Vitals  BP (!) 119/58   Pulse 70   Temp 98.1 °F (36.7 °C)   Resp 20   Ht 5' 9\" (1.753 m)   Wt 124.5 kg (274 lb 7.6 oz)   SpO2 95%   BMI 40.53 kg/m²      Temp (24hrs), Av.7 °F (37.6 °C), Min:95.4 °F (35.2 °C), Max:102.7 °F (39.3 °C)    24 hr VS reviewed. General Appearance:   [x] well developed, well nourished,  [x] NAD.       [] agitated   [] lethargic but arousable  [] obtunded   ENT, Palate:    [x] WNL   [] dry palate/MM        Respiratory:    [x] CTA bilateral  [] rales  [] rhonchi  [] normal resp effort      [] similar to yesterday   [] worse    [] improved   Cardiovascular:   [x] RRR   [] Irregular rate and rhythm   [x] Normal S1, S2   [x] No gallop or rub. [] no murmur   [x] no new murmur  [] murmur c/w:   [x] no edema  RLE: []1+ []2+ [] 3+;  LLE: []1+ []2+ []3+      [] edema similar to yesterday   [] worse    [] improved   [] normal JVP   [] Elevated JVP    [] JVP similar to yesterday   [] worse    [] improved   [] carotid upstroke unchanged   [x] abd aorta not palpated   [x] no stigmata of peripheral emboli   GI:    [x] abd soft, non-distented,bowel sounds present, no                     organomegaly appreciated   Skin:  Neuro:    Cath Site:  [x] warm and dry [] cold extremities   [x] A/O x 3, grossly non-focal      [] intact w/o hematoma or bruit; distal pulse unchanged.               Data Review:   Labs:    Recent Results (from the past 24 hour(s))   GLUCOSE, POC    Collection Time: 11/26/22 11:33 AM   Result Value Ref Range    Glucose (POC) 92 65 - 117 mg/dL    Performed by Yoanna Brown (BRYAN SAUL)    GLUCOSTABILIZER    Collection Time: 11/26/22 11:34 AM   Result Value Ref Range    Glucose 92 mg/dL    Insulin order 2.5 units/hour    Insulin adminstered 2.5 units/hour    Multiplier 0.079     Low target 95 mg/dL    High target 130 mg/dL    D50 order 0.0 ml    D50 administered 0.00 ml    Minutes until next BG 60 min    Order initials jhl     Administered initials jhl    GLUCOSE, POC    Collection Time: 11/26/22 12:39 PM   Result Value Ref Range    Glucose (POC) 145 (H) 65 - 117 mg/dL    Performed by Yoanna Brown (BRYAN SAUL)    GLUCOSTABILIZER    Collection Time: 11/26/22 12:40 PM   Result Value Ref Range    Glucose 145 mg/dL    Insulin order 7.6 units/hour    Insulin adminstered 7.6 units/hour    Multiplier 0.089     Low target 95 mg/dL    High target 130 mg/dL    D50 order 0.0 ml    D50 administered 0.00 ml    Minutes until next BG 60 min    Order initials jhl     Administered initials l    GLUCOSE, POC    Collection Time: 11/26/22  1:48 PM   Result Value Ref Range    Glucose (POC) 146 (H) 65 - 117 mg/dL    Performed by Behzad Nguyễn (BRYAN RN)    GLUCOSTABILIZER    Collection Time: 11/26/22  1:48 PM   Result Value Ref Range    Glucose 146 mg/dL    Insulin order 8.5 units/hour    Insulin adminstered 8.5 units/hour    Multiplier 0.099     Low target 95 mg/dL    High target 130 mg/dL    D50 order 0.0 ml    D50 administered 0.00 ml    Minutes until next BG 60 min    Order initials jhl     Administered initials [facility]    GLUCOSE, POC    Collection Time: 11/26/22  3:01 PM   Result Value Ref Range    Glucose (POC) 106 65 - 117 mg/dL    Performed by Behzad Nguyễn (BRYAN SAUL)    GLUCOSTABILIZER    Collection Time: 11/26/22  3:01 PM   Result Value Ref Range    Glucose 106 mg/dL    Insulin order 4.6 units/hour    Insulin adminstered 4.6 units/hour    Multiplier 0.099     Low target 95 mg/dL    High target 130 mg/dL    D50 order 0.0 ml    D50 administered 0.00 ml    Minutes until next BG 60 min    Order initials jhl     Administered initials [facility]    GLUCOSE, POC    Collection Time: 11/26/22  4:06 PM   Result Value Ref Range    Glucose (POC) 123 (H) 65 - 117 mg/dL    Performed by Behzad Nguyễn (BRYAN RN)    GLUCOSTABILIZER    Collection Time: 11/26/22  4:06 PM   Result Value Ref Range    Glucose 123 mg/dL    Insulin order 6.2 units/hour    Insulin adminstered 6.2 units/hour    Multiplier 0.099     Low target 95 mg/dL    High target 130 mg/dL    D50 order 0.0 ml    D50 administered 0.00 ml    Minutes until next BG 60 min    Order initials l     Administered initials [facility]    GLUCOSE, POC    Collection Time: 11/26/22  5:17 PM   Result Value Ref Range    Glucose (POC) 115 65 - 117 mg/dL    Performed by Behzad Nguyễn (BRYAN SAUL)    GLUCOSTABILIZER    Collection Time: 11/26/22  5:18 PM   Result Value Ref Range    Glucose 115 mg/dL    Insulin order 5.4 units/hour    Insulin adminstered 5.4 units/hour    Multiplier 0.099 Low target 95 mg/dL    High target 130 mg/dL    D50 order 0.0 ml    D50 administered 0.00 ml    Minutes until next BG 60 min    Order initials jhl     Administered initials jhl    GLUCOSE, POC    Collection Time: 11/26/22  6:19 PM   Result Value Ref Range    Glucose (POC) 121 (H) 65 - 117 mg/dL    Performed by Stepan Basilio (BRYAN RN)    GLUCOSTABILIZER    Collection Time: 11/26/22  6:19 PM   Result Value Ref Range    Glucose 121 mg/dL    Insulin order 6.0 units/hour    Insulin adminstered 6.0 units/hour    Multiplier 0.099     Low target 95 mg/dL    High target 130 mg/dL    D50 order 0.0 ml    D50 administered 0.00 ml    Minutes until next BG 60 min    Order initials jhl     Administered initials jhl    GLUCOSE, POC    Collection Time: 11/26/22  7:33 PM   Result Value Ref Range    Glucose (POC) 116 65 - 117 mg/dL    Performed by Stepan Basilio (BRYAN RN)    GLUCOSTABILIZER    Collection Time: 11/26/22  7:33 PM   Result Value Ref Range    Glucose 116 mg/dL    Insulin order 5.5 units/hour    Insulin adminstered 5.5 units/hour    Multiplier 0.099     Low target 95 mg/dL    High target 130 mg/dL    D50 order 0.0 ml    D50 administered 0.00 ml    Minutes until next BG 60 min    Order initials [facility]     Administered initials jhl    GLUCOSE, POC    Collection Time: 11/26/22  8:33 PM   Result Value Ref Range    Glucose (POC) 115 65 - 117 mg/dL    Performed by Caroline Chilel RN    GLUCOSTABILIZER    Collection Time: 11/26/22  8:34 PM   Result Value Ref Range    Glucose 115 mg/dL    Insulin order 5.4 units/hour    Insulin adminstered 5.4 units/hour    Multiplier 0.099     Low target 95 mg/dL    High target 130 mg/dL    D50 order 0.0 ml    D50 administered 0.00 ml    Minutes until next  min    Order initials cms     Administered initials cms    GLUCOSE, POC    Collection Time: 11/26/22 10:46 PM   Result Value Ref Range    Glucose (POC) 102 65 - 117 mg/dL    [facility] [place]    Collection Time: 11/26/22 10:46 PM   Result Value Ref Range    Glucose 102 mg/dL    Insulin order 4.2 units/hour    Insulin adminstered 4.2 units/hour    Multiplier 0.099     Low target 95 mg/dL    High target 130 mg/dL    D50 order 0.0 ml    D50 administered 0.00 ml    Minutes until next  min    Order initials MM     Administered initials MM    METABOLIC PANEL, BASIC    Collection Time: 11/27/22 12:09 AM   Result Value Ref Range    Sodium 138 136 - 145 mmol/L    Potassium 4.6 3.5 - 5.1 mmol/L    Chloride 105 97 - 108 mmol/L    CO2 28 21 - 32 mmol/L    Anion gap 5 5 - 15 mmol/L    Glucose 100 65 - 100 mg/dL    BUN 48 (H) 6 - 20 MG/DL    Creatinine 1.34 (H) 0.70 - 1.30 MG/DL    BUN/Creatinine ratio 36 (H) 12 - 20      eGFR >60 >60 ml/min/1.73m2    Calcium 7.7 (L) 8.5 - 10.1 MG/DL   CBC W/O DIFF    Collection Time: 11/27/22 12:09 AM   Result Value Ref Range    WBC 12.5 (H) 4.1 - 11.1 K/uL    RBC 2.80 (L) 4.10 - 5.70 M/uL    HGB 7.8 (L) 12.1 - 17.0 g/dL    HCT 24.1 (L) 36.6 - 50.3 %    MCV 86.1 80.0 - 99.0 FL    MCH 27.9 26.0 - 34.0 PG    MCHC 32.4 30.0 - 36.5 g/dL    RDW 14.7 (H) 11.5 - 14.5 %    PLATELET 380 (L) 894 - 400 K/uL    MPV 9.8 8.9 - 12.9 FL    NRBC 0.3 (H) 0  WBC    ABSOLUTE NRBC 0.04 (H) 0.00 - 0.01 K/uL   PHOSPHORUS    Collection Time: 11/27/22 12:09 AM   Result Value Ref Range    Phosphorus 4.8 (H) 2.6 - 4.7 MG/DL   MAGNESIUM    Collection Time: 11/27/22 12:09 AM   Result Value Ref Range    Magnesium 2.7 (H) 1.6 - 2.4 mg/dL   GLUCOSE, POC    Collection Time: 11/27/22 12:54 AM   Result Value Ref Range    Glucose (POC) 95 65 - 117 mg/dL    Performed by Cher Hubbard RN    GLUCOSTABILIZER    Collection Time: 11/27/22 12:55 AM   Result Value Ref Range    Glucose 95 mg/dL    Insulin order 3.5 units/hour    Insulin adminstered 3.5 units/hour    Multiplier 0.099     Low target 95 mg/dL    High target 130 mg/dL    D50 order 0.0 ml    D50 administered 0.00 ml    Minutes until next  min    Order initials cms     Administered initials cms    GLUCOSE, POC    Collection Time: 11/27/22  3:00 AM   Result Value Ref Range    Glucose (POC) 104 65 - 117 mg/dL    Performed by Marnie Faria RN    GLUCOSTABILIZER    Collection Time: 11/27/22  3:00 AM   Result Value Ref Range    Glucose 104 mg/dL    Insulin order 4.4 units/hour    Insulin adminstered 4.4 units/hour    Multiplier 0.099     Low target 95 mg/dL    High target 130 mg/dL    D50 order 0.0 ml    D50 administered 0.00 ml    Minutes until next  min    Order initials cms     Administered initials cms    PROCALCITONIN    Collection Time: 11/27/22  4:25 AM   Result Value Ref Range    Procalcitonin 8.55 ng/mL   METABOLIC PANEL, COMPREHENSIVE    Collection Time: 11/27/22  4:25 AM   Result Value Ref Range    Sodium 138 136 - 145 mmol/L    Potassium 4.6 3.5 - 5.1 mmol/L    Chloride 105 97 - 108 mmol/L    CO2 28 21 - 32 mmol/L    Anion gap 5 5 - 15 mmol/L    Glucose 121 (H) 65 - 100 mg/dL    BUN 45 (H) 6 - 20 MG/DL    Creatinine 1.15 0.70 - 1.30 MG/DL    BUN/Creatinine ratio 39 (H) 12 - 20      eGFR >60 >60 ml/min/1.73m2    Calcium 7.6 (L) 8.5 - 10.1 MG/DL    Bilirubin, total 1.1 (H) 0.2 - 1.0 MG/DL    ALT (SGPT) 1,137 (H) 12 - 78 U/L    AST (SGOT) >2,000 (H) 15 - 37 U/L    Alk.  phosphatase 279 (H) 45 - 117 U/L    Protein, total 5.4 (L) 6.4 - 8.2 g/dL    Albumin 2.7 (L) 3.5 - 5.0 g/dL    Globulin 2.7 2.0 - 4.0 g/dL    A-G Ratio 1.0 (L) 1.1 - 2.2     CBC WITH AUTOMATED DIFF    Collection Time: 11/27/22  4:25 AM   Result Value Ref Range    WBC 13.1 (H) 4.1 - 11.1 K/uL    RBC 2.82 (L) 4.10 - 5.70 M/uL    HGB 7.7 (L) 12.1 - 17.0 g/dL    HCT 24.2 (L) 36.6 - 50.3 %    MCV 85.8 80.0 - 99.0 FL    MCH 27.3 26.0 - 34.0 PG    MCHC 31.8 30.0 - 36.5 g/dL    RDW 14.7 (H) 11.5 - 14.5 %    PLATELET 561 (L) 309 - 400 K/uL    MPV 10.2 8.9 - 12.9 FL    NRBC 0.2 (H) 0  WBC    ABSOLUTE NRBC 0.02 (H) 0.00 - 0.01 K/uL    NEUTROPHILS 77 (H) 32 - 75 %    LYMPHOCYTES 13 12 - 49 % MONOCYTES 7 5 - 13 %    EOSINOPHILS 1 0 - 7 %    BASOPHILS 0 0 - 1 %    METAMYELOCYTES 1 %    MYELOCYTES 1 %    IMMATURE GRANULOCYTES 0 0.0 - 0.5 %    ABS. NEUTROPHILS 10.1 (H) 1.8 - 8.0 K/UL    ABS. LYMPHOCYTES 1.7 0.8 - 3.5 K/UL    ABS. MONOCYTES 0.9 0.0 - 1.0 K/UL    ABS. EOSINOPHILS 0.1 0.0 - 0.4 K/UL    ABS. BASOPHILS 0.0 0.0 - 0.1 K/UL    ABS. IMM. GRANS. 0.0 0.00 - 0.04 K/UL    DF MANUAL      RBC COMMENTS NORMOCYTIC, NORMOCHROMIC     VANCOMYCIN, TROUGH    Collection Time: 11/27/22  4:25 AM   Result Value Ref Range    Vancomycin,trough 9.9 5.0 - 10.0 ug/mL    Reported dose date NOT PROVIDED      Reported dose time: NOT PROVIDED      Reported dose: NOT PROVIDED UNITS   MAGNESIUM    Collection Time: 11/27/22  4:25 AM   Result Value Ref Range    Magnesium 2.7 (H) 1.6 - 2.4 mg/dL   POC G3 - PUL    Collection Time: 11/27/22  4:42 AM   Result Value Ref Range    FIO2 (POC) 80 %    pH (POC) 7.38 7.35 - 7.45      pCO2 (POC) 43.7 35.0 - 45.0 MMHG    pO2 (POC) 75 (L) 80 - 100 MMHG    HCO3 (POC) 25.6 22 - 26 MMOL/L    sO2 (POC) 94.5 92 - 97 %    Base excess (POC) 0.2 mmol/L    Site DRAWN FROM ARTERIAL LINE      Device: ADULT VENT      Mode ASSIST CONTROL      Tidal volume 450 ml    Set Rate 20 bpm    PEEP/CPAP (POC) 12 cmH2O    Allens test (POC) NOT APPLICABLE      Specimen type (POC) ARTERIAL     CARBOXYHEMOGLOBIN    Collection Time: 11/27/22  4:45 AM   Result Value Ref Range    Carboxy-Hgb 0.0 (L) 1 - 2 %    Methemoglobin 0.2 0 - 1.4 %    Oxyhemoglobin 57.8 (LL) 94 - 97 %    O2 SATURATION 58 (L) 95 - 99 %    SITE DRAWN FROM ARTERIAL LINE      Sample source ARTERIAL      Critical value read back       Called to VERONICA Cerrato RN on 11/27/2022 at 04:49   GLUCOSE, POC    Collection Time: 11/27/22  5:10 AM   Result Value Ref Range    Glucose (POC) 125 (H) 65 - 117 mg/dL    Performed by Jean Claude Sharma RN    GLUCOSTABILIZER    Collection Time: 11/27/22  5:10 AM   Result Value Ref Range    Glucose 125 mg/dL    Insulin order 6.4 units/hour    Insulin adminstered 6.4 units/hour    Multiplier 0.099     Low target 95 mg/dL    High target 130 mg/dL    D50 order 0.0 ml    D50 administered 0.00 ml    Minutes until next  min    Order initials cns     Administered initials cms    GLUCOSE, POC    Collection Time: 11/27/22  7:20 AM   Result Value Ref Range    Glucose (POC) 111 65 - 117 mg/dL    Performed by Annamarie Mark RN    GLUCOSTABILIZER    Collection Time: 11/27/22  7:20 AM   Result Value Ref Range    Glucose 111 mg/dL    Insulin order 5.0 units/hour    Insulin adminstered 5.0 units/hour    Multiplier 0.099     Low target 95 mg/dL    High target 130 mg/dL    D50 order 0.0 ml    D50 administered 0.00 ml    Minutes until next  min    Order initials cms     Administered initials cms    AMYLASE    Collection Time: 11/27/22  8:47 AM   Result Value Ref Range    Amylase 28 25 - 115 U/L   LIPASE    Collection Time: 11/27/22  8:47 AM   Result Value Ref Range    Lipase 76 73 - 393 U/L   GLUCOSE, POC    Collection Time: 11/27/22  9:26 AM   Result Value Ref Range    Glucose (POC) 129 (H) 65 - 117 mg/dL    Performed by Sancho Mcclendon RN    GLUCOSTABILIZER    Collection Time: 11/27/22  9:26 AM   Result Value Ref Range    Glucose 129 mg/dL    Insulin order 6.8 units/hour    Insulin adminstered 6.8 units/hour    Multiplier 0.099     Low target 95 mg/dL    High target 130 mg/dL    D50 order 0.0 ml    D50 administered 0.00 ml    Minutes until next  min    Order initials MG     Administered initials MG    ECHO ADULT FOLLOW-UP OR LIMITED    Collection Time: 11/27/22 10:32 AM   Result Value Ref Range    LVIDd M-mode 4.2 4.2 - 5.9 cm    LVIDs M-mode 3.1 cm    AV Peak Gradient 13 mmHg    AV Mean Gradient 7 mmHg    AV Peak Velocity 1.8 m/s    AV Mean Velocity 1.2 m/s    AV VTI 24.3 cm       Provided Telemetry: [x] sinus  [] chronic afib   [] paroxysmal afib  [] NSVT      Assessment:     Active Problems:    Acute non-Q wave non-ST elevation myocardial infarction (NSTEMI) ([facility] [address] (11/15/2022)      Aortic stenosis (11/22/2022)      CAD (coronary artery disease) (11/22/2022)      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)      Overview: On pump CORONARY ARTERY BYPASS GRAFT  X 1 WITH LEFT INTERNAL MAMMARY       ARTERY to LAD      AORTIC VALVE REPLACEMENT WITH MEYER 25MM INSPIRIS RESILIA TISSUE VALVE       REPAIR OF ASCENDING AORTIC ANEURYSM with 26mm hemashield graft      S/P AVR (aortic valve replacement) and aortoplasty (11/22/2022)      Overview: On pump CORONARY ARTERY BYPASS GRAFT  X 1 WITH LEFT INTERNAL MAMMARY       ARTERY to LAD      AORTIC VALVE REPLACEMENT WITH MEYER 25MM INSPIRIS RESILIA TISSUE VALVE       REPAIR OF ASCENDING AORTIC ANEURYSM with 26mm hemashield graft      Plan:     Limited echo done at the bedside. Preliminarily, normal LV systolic function, EF 51-03%. R heart appears normal in size and function on limited views. Trace if any pericardial effusion. Dr. Omi Story should be back tomorrow. For all other plans, see orders.     [x] High complexity decision making was performed     Signed By: Kashif Sparrow MD     November 27, 2022

## 2022-11-28 ENCOUNTER — APPOINTMENT (OUTPATIENT)
Dept: CT IMAGING | Age: 42
End: 2022-11-28
Attending: NURSE PRACTITIONER
Payer: COMMERCIAL

## 2022-11-28 ENCOUNTER — APPOINTMENT (OUTPATIENT)
Dept: GENERAL RADIOLOGY | Age: 42
End: 2022-11-28
Attending: PHYSICIAN ASSISTANT
Payer: COMMERCIAL

## 2022-11-28 LAB
ABO + RH BLD: NORMAL
ALBUMIN SERPL-MCNC: 2.4 G/DL (ref 3.5–5)
ALBUMIN SERPL-MCNC: 2.5 G/DL (ref 3.5–5)
ALBUMIN/GLOB SERPL: 0.6 {RATIO} (ref 1.1–2.2)
ALBUMIN/GLOB SERPL: 0.6 {RATIO} (ref 1.1–2.2)
ALP SERPL-CCNC: 455 U/L (ref 45–117)
ALP SERPL-CCNC: 461 U/L (ref 45–117)
ALT SERPL-CCNC: 1020 U/L (ref 12–78)
ALT SERPL-CCNC: 1110 U/L (ref 12–78)
ANION GAP SERPL CALC-SCNC: 6 MMOL/L (ref 5–15)
ANION GAP SERPL CALC-SCNC: 8 MMOL/L (ref 5–15)
ARTERIAL PATENCY WRIST A: ABNORMAL
AST SERPL-CCNC: 1010 U/L (ref 15–37)
AST SERPL-CCNC: 707 U/L (ref 15–37)
ATRIAL RATE: 78 BPM
ATRIAL RATE: 79 BPM
ATRIAL RATE: 86 BPM
BASE EXCESS BLDA CALC-SCNC: 0.8 MMOL/L
BASE EXCESS BLDA CALC-SCNC: 1.8 MMOL/L
BASOPHILS # BLD: 0.3 K/UL (ref 0–0.1)
BASOPHILS NFR BLD: 2 % (ref 0–1)
BDY SITE: ABNORMAL
BILIRUB DIRECT SERPL-MCNC: 0.6 MG/DL (ref 0–0.2)
BILIRUB SERPL-MCNC: 1.3 MG/DL (ref 0.2–1)
BILIRUB SERPL-MCNC: 1.5 MG/DL (ref 0.2–1)
BLD PROD TYP BPU: NORMAL
BLOOD GROUP ANTIBODIES SERPL: NORMAL
BPU ID: NORMAL
BUN SERPL-MCNC: 43 MG/DL (ref 6–20)
BUN SERPL-MCNC: 44 MG/DL (ref 6–20)
BUN/CREAT SERPL: 33 (ref 12–20)
BUN/CREAT SERPL: 36 (ref 12–20)
CALCIUM SERPL-MCNC: 8.1 MG/DL (ref 8.5–10.1)
CALCIUM SERPL-MCNC: 8.4 MG/DL (ref 8.5–10.1)
CALCULATED P AXIS, ECG09: 42 DEGREES
CALCULATED P AXIS, ECG09: 42 DEGREES
CALCULATED P AXIS, ECG09: 54 DEGREES
CALCULATED R AXIS, ECG10: 18 DEGREES
CALCULATED R AXIS, ECG10: 38 DEGREES
CALCULATED R AXIS, ECG10: 41 DEGREES
CALCULATED T AXIS, ECG11: -144 DEGREES
CALCULATED T AXIS, ECG11: 35 DEGREES
CALCULATED T AXIS, ECG11: 95 DEGREES
CHLORIDE SERPL-SCNC: 102 MMOL/L (ref 97–108)
CHLORIDE SERPL-SCNC: 103 MMOL/L (ref 97–108)
CO2 SERPL-SCNC: 27 MMOL/L (ref 21–32)
CO2 SERPL-SCNC: 29 MMOL/L (ref 21–32)
CORTIS SERPL-MCNC: 23.3 UG/DL
CREAT SERPL-MCNC: 1.23 MG/DL (ref 0.7–1.3)
CREAT SERPL-MCNC: 1.29 MG/DL (ref 0.7–1.3)
CROSSMATCH RESULT,%XM: NORMAL
D DIMER PPP FEU-MCNC: 6.78 MG/L FEU (ref 0–0.65)
DIAGNOSIS, 93000: NORMAL
DIFFERENTIAL METHOD BLD: ABNORMAL
ECHO AV MEAN GRADIENT: 7 MMHG
ECHO AV MEAN VELOCITY: 1.2 M/S
ECHO AV PEAK GRADIENT: 13 MMHG
ECHO AV PEAK VELOCITY: 1.8 M/S
ECHO AV VTI: 24.3 CM
ECHO LV FRACTIONAL SHORTENING: 26 % (ref 28–44)
ECHO LV INTERNAL DIMENSION DIASTOLE INDEX: 1.78 CM/M2
ECHO LV INTERNAL DIMENSION DIASTOLIC MMODE: 4.2 CM (ref 4.2–5.9)
ECHO LV INTERNAL DIMENSION DIASTOLIC: 4.2 CM (ref 4.2–5.9)
ECHO LV INTERNAL DIMENSION SYSTOLIC INDEX: 1.31 CM/M2
ECHO LV INTERNAL DIMENSION SYSTOLIC MMODE: 3.1 CM
ECHO LV INTERNAL DIMENSION SYSTOLIC: 3.1 CM
EOSINOPHIL # BLD: 0.8 K/UL (ref 0–0.4)
EOSINOPHIL NFR BLD: 5 % (ref 0–7)
ERYTHROCYTE [DISTWIDTH] IN BLOOD BY AUTOMATED COUNT: 15.6 % (ref 11.5–14.5)
FIO2 ON VENT: 80 %
GAS FLOW.O2 SETTING OXYMISER: 20 L/MIN
GLOBULIN SER CALC-MCNC: 3.8 G/DL (ref 2–4)
GLOBULIN SER CALC-MCNC: 4.1 G/DL (ref 2–4)
GLUCOSE BLD STRIP.AUTO-MCNC: 100 MG/DL (ref 65–117)
GLUCOSE BLD STRIP.AUTO-MCNC: 104 MG/DL (ref 65–117)
GLUCOSE BLD STRIP.AUTO-MCNC: 106 MG/DL (ref 65–117)
GLUCOSE BLD STRIP.AUTO-MCNC: 106 MG/DL (ref 65–117)
GLUCOSE BLD STRIP.AUTO-MCNC: 111 MG/DL (ref 65–117)
GLUCOSE BLD STRIP.AUTO-MCNC: 112 MG/DL (ref 65–117)
GLUCOSE BLD STRIP.AUTO-MCNC: 113 MG/DL (ref 65–117)
GLUCOSE BLD STRIP.AUTO-MCNC: 114 MG/DL (ref 65–117)
GLUCOSE BLD STRIP.AUTO-MCNC: 115 MG/DL (ref 65–117)
GLUCOSE BLD STRIP.AUTO-MCNC: 118 MG/DL (ref 65–117)
GLUCOSE BLD STRIP.AUTO-MCNC: 118 MG/DL (ref 65–117)
GLUCOSE BLD STRIP.AUTO-MCNC: 122 MG/DL (ref 65–117)
GLUCOSE BLD STRIP.AUTO-MCNC: 123 MG/DL (ref 65–117)
GLUCOSE BLD STRIP.AUTO-MCNC: 124 MG/DL (ref 65–117)
GLUCOSE BLD STRIP.AUTO-MCNC: 126 MG/DL (ref 65–117)
GLUCOSE BLD STRIP.AUTO-MCNC: 131 MG/DL (ref 65–117)
GLUCOSE BLD STRIP.AUTO-MCNC: 134 MG/DL (ref 65–117)
GLUCOSE BLD STRIP.AUTO-MCNC: 86 MG/DL (ref 65–117)
GLUCOSE BLD STRIP.AUTO-MCNC: 96 MG/DL (ref 65–117)
GLUCOSE SERPL-MCNC: 119 MG/DL (ref 65–100)
GLUCOSE SERPL-MCNC: 126 MG/DL (ref 65–100)
HAV IGM SER QL: NONREACTIVE
HBV CORE IGM SER QL: NONREACTIVE
HBV SURFACE AG SER QL: 0.37 INDEX
HBV SURFACE AG SER QL: NEGATIVE
HCO3 BLDA-SCNC: 26 MMOL/L (ref 22–26)
HCO3 BLDA-SCNC: 27 MMOL/L (ref 22–26)
HCT VFR BLD AUTO: 28 % (ref 36.6–50.3)
HCV AB SERPL QL IA: NONREACTIVE
HGB BLD-MCNC: 9.2 G/DL (ref 12.1–17)
IMM GRANULOCYTES # BLD AUTO: 0 K/UL (ref 0–0.04)
IMM GRANULOCYTES NFR BLD AUTO: 0 % (ref 0–0.5)
INR PPP: 1.3 (ref 0.9–1.1)
LYMPHOCYTES # BLD: 2.5 K/UL (ref 0.8–3.5)
LYMPHOCYTES NFR BLD: 15 % (ref 12–49)
MAGNESIUM SERPL-MCNC: 2.3 MG/DL (ref 1.6–2.4)
MCH RBC QN AUTO: 27.4 PG (ref 26–34)
MCHC RBC AUTO-ENTMCNC: 32.9 G/DL (ref 30–36.5)
MCV RBC AUTO: 83.3 FL (ref 80–99)
MONOCYTES # BLD: 0.8 K/UL (ref 0–1)
MONOCYTES NFR BLD: 5 % (ref 5–13)
NEUTS SEG # BLD: 12.1 K/UL (ref 1.8–8)
NEUTS SEG NFR BLD: 73 % (ref 32–75)
NRBC # BLD: 0.06 K/UL (ref 0–0.01)
NRBC BLD-RTO: 0.4 PER 100 WBC
P-R INTERVAL, ECG05: 138 MS
P-R INTERVAL, ECG05: 140 MS
P-R INTERVAL, ECG05: 168 MS
PCO2 BLDA: 42 MMHG (ref 35–45)
PCO2 BLDA: 47 MMHG (ref 35–45)
PEEP RESPIRATORY: 12 CM[H2O]
PERIPHERAL SMEAR,PSM: NORMAL
PH BLDA: 7.39 [PH] (ref 7.35–7.45)
PH BLDA: 7.4 [PH] (ref 7.35–7.45)
PLATELET # BLD AUTO: 212 K/UL (ref 150–400)
PMV BLD AUTO: 10.1 FL (ref 8.9–12.9)
PO2 BLDA: 104 MMHG (ref 80–100)
PO2 BLDA: 84 MMHG (ref 80–100)
POTASSIUM SERPL-SCNC: 3.7 MMOL/L (ref 3.5–5.1)
POTASSIUM SERPL-SCNC: 4.2 MMOL/L (ref 3.5–5.1)
PROCALCITONIN SERPL-MCNC: 1.77 NG/ML
PROCALCITONIN SERPL-MCNC: 2 NG/ML
PROT SERPL-MCNC: 6.2 G/DL (ref 6.4–8.2)
PROT SERPL-MCNC: 6.6 G/DL (ref 6.4–8.2)
PROTHROMBIN TIME: 13.6 SEC (ref 9–11.1)
Q-T INTERVAL, ECG07: 348 MS
Q-T INTERVAL, ECG07: 380 MS
Q-T INTERVAL, ECG07: 396 MS
QRS DURATION, ECG06: 100 MS
QRS DURATION, ECG06: 86 MS
QRS DURATION, ECG06: 98 MS
QTC CALCULATION (BEZET), ECG08: 396 MS
QTC CALCULATION (BEZET), ECG08: 435 MS
QTC CALCULATION (BEZET), ECG08: 473 MS
RBC # BLD AUTO: 3.36 M/UL (ref 4.1–5.7)
RBC MORPH BLD: ABNORMAL
SAO2 % BLD: 96 % (ref 92–97)
SAO2 % BLD: 98 % (ref 92–97)
SAO2% DEVICE SAO2% SENSOR NAME: ABNORMAL
SAO2% DEVICE SAO2% SENSOR NAME: ABNORMAL
SERVICE CMNT-IMP: ABNORMAL
SERVICE CMNT-IMP: NORMAL
SODIUM SERPL-SCNC: 137 MMOL/L (ref 136–145)
SODIUM SERPL-SCNC: 138 MMOL/L (ref 136–145)
SP1: NORMAL
SP2: NORMAL
SP3: NORMAL
SPECIMEN EXP DATE BLD: NORMAL
SPECIMEN SITE: ABNORMAL
STATUS OF UNIT,%ST: NORMAL
T4 FREE SERPL-MCNC: 0.9 NG/DL (ref 0.8–1.5)
TSH SERPL DL<=0.05 MIU/L-ACNC: 6.7 UIU/ML (ref 0.36–3.74)
UFH PPP CHRO-ACNC: 0.36 IU/ML
UFH PPP CHRO-ACNC: 0.39 IU/ML
UFH PPP CHRO-ACNC: 0.41 IU/ML
UNIT DIVISION, %UDIV: 0
VENTILATION MODE VENT: ABNORMAL
VENTRICULAR RATE, ECG03: 78 BPM
VENTRICULAR RATE, ECG03: 79 BPM
VENTRICULAR RATE, ECG03: 86 BPM
VT SETTING VENT: 450 ML
WBC # BLD AUTO: 16.5 K/UL (ref 4.1–11.1)

## 2022-11-28 PROCEDURE — 74011250637 HC RX REV CODE- 250/637: Performed by: PHYSICIAN ASSISTANT

## 2022-11-28 PROCEDURE — 99292 CRITICAL CARE ADDL 30 MIN: CPT | Performed by: THORACIC SURGERY (CARDIOTHORACIC VASCULAR SURGERY)

## 2022-11-28 PROCEDURE — C9113 INJ PANTOPRAZOLE SODIUM, VIA: HCPCS | Performed by: INTERNAL MEDICINE

## 2022-11-28 PROCEDURE — 86381 MITOCHONDRIAL ANTIBODY EACH: CPT

## 2022-11-28 PROCEDURE — 93005 ELECTROCARDIOGRAM TRACING: CPT

## 2022-11-28 PROCEDURE — 65610000006 HC RM INTENSIVE CARE

## 2022-11-28 PROCEDURE — 74011250636 HC RX REV CODE- 250/636: Performed by: INTERNAL MEDICINE

## 2022-11-28 PROCEDURE — 80053 COMPREHEN METABOLIC PANEL: CPT

## 2022-11-28 PROCEDURE — 94003 VENT MGMT INPAT SUBQ DAY: CPT

## 2022-11-28 PROCEDURE — 74011000250 HC RX REV CODE- 250: Performed by: ANESTHESIOLOGY

## 2022-11-28 PROCEDURE — 84443 ASSAY THYROID STIM HORMONE: CPT

## 2022-11-28 PROCEDURE — 74011250636 HC RX REV CODE- 250/636: Performed by: PHYSICIAN ASSISTANT

## 2022-11-28 PROCEDURE — 74011000250 HC RX REV CODE- 250: Performed by: NURSE PRACTITIONER

## 2022-11-28 PROCEDURE — 99233 SBSQ HOSP IP/OBS HIGH 50: CPT | Performed by: CLINICAL NURSE SPECIALIST

## 2022-11-28 PROCEDURE — 84439 ASSAY OF FREE THYROXINE: CPT

## 2022-11-28 PROCEDURE — 74011000250 HC RX REV CODE- 250: Performed by: INTERNAL MEDICINE

## 2022-11-28 PROCEDURE — 74011000258 HC RX REV CODE- 258: Performed by: NURSE PRACTITIONER

## 2022-11-28 PROCEDURE — 99291 CRITICAL CARE FIRST HOUR: CPT | Performed by: THORACIC SURGERY (CARDIOTHORACIC VASCULAR SURGERY)

## 2022-11-28 PROCEDURE — 71045 X-RAY EXAM CHEST 1 VIEW: CPT

## 2022-11-28 PROCEDURE — 36415 COLL VENOUS BLD VENIPUNCTURE: CPT

## 2022-11-28 PROCEDURE — 86923 COMPATIBILITY TEST ELECTRIC: CPT

## 2022-11-28 PROCEDURE — 74011000258 HC RX REV CODE- 258: Performed by: INTERNAL MEDICINE

## 2022-11-28 PROCEDURE — 82803 BLOOD GASES ANY COMBINATION: CPT

## 2022-11-28 PROCEDURE — 99231 SBSQ HOSP IP/OBS SF/LOW 25: CPT | Performed by: SURGERY

## 2022-11-28 PROCEDURE — 82787 IGG 1 2 3 OR 4 EACH: CPT

## 2022-11-28 PROCEDURE — 85025 COMPLETE CBC W/AUTO DIFF WBC: CPT

## 2022-11-28 PROCEDURE — 74011000250 HC RX REV CODE- 250: Performed by: THORACIC SURGERY (CARDIOTHORACIC VASCULAR SURGERY)

## 2022-11-28 PROCEDURE — 74011000636 HC RX REV CODE- 636: Performed by: THORACIC SURGERY (CARDIOTHORACIC VASCULAR SURGERY)

## 2022-11-28 PROCEDURE — 84145 PROCALCITONIN (PCT): CPT

## 2022-11-28 PROCEDURE — 82962 GLUCOSE BLOOD TEST: CPT

## 2022-11-28 PROCEDURE — 74178 CT ABD&PLV WO CNTR FLWD CNTR: CPT

## 2022-11-28 PROCEDURE — 85610 PROTHROMBIN TIME: CPT

## 2022-11-28 PROCEDURE — 82248 BILIRUBIN DIRECT: CPT

## 2022-11-28 PROCEDURE — 74011250636 HC RX REV CODE- 250/636: Performed by: NURSE PRACTITIONER

## 2022-11-28 PROCEDURE — 83735 ASSAY OF MAGNESIUM: CPT

## 2022-11-28 PROCEDURE — 85520 HEPARIN ASSAY: CPT

## 2022-11-28 PROCEDURE — 87103 BLOOD FUNGUS CULTURE: CPT

## 2022-11-28 PROCEDURE — 71275 CT ANGIOGRAPHY CHEST: CPT

## 2022-11-28 PROCEDURE — 74011636637 HC RX REV CODE- 636/637: Performed by: NURSE PRACTITIONER

## 2022-11-28 PROCEDURE — 99223 1ST HOSP IP/OBS HIGH 75: CPT | Performed by: INTERNAL MEDICINE

## 2022-11-28 PROCEDURE — 86900 BLOOD TYPING SEROLOGIC ABO: CPT

## 2022-11-28 PROCEDURE — 74011250637 HC RX REV CODE- 250/637: Performed by: THORACIC SURGERY (CARDIOTHORACIC VASCULAR SURGERY)

## 2022-11-28 RX ORDER — HEPARIN SODIUM 5000 [USP'U]/ML
5000 INJECTION, SOLUTION INTRAVENOUS; SUBCUTANEOUS EVERY 8 HOURS
Status: DISCONTINUED | OUTPATIENT
Start: 2022-11-28 | End: 2022-12-05

## 2022-11-28 RX ORDER — ENOXAPARIN SODIUM 100 MG/ML
30 INJECTION SUBCUTANEOUS EVERY 12 HOURS
Status: DISCONTINUED | OUTPATIENT
Start: 2022-11-28 | End: 2022-11-28

## 2022-11-28 RX ORDER — BALSAM PERU/CASTOR OIL
OINTMENT (GRAM) TOPICAL 2 TIMES DAILY
Status: DISCONTINUED | OUTPATIENT
Start: 2022-11-28 | End: 2022-12-09 | Stop reason: HOSPADM

## 2022-11-28 RX ORDER — MEROPENEM 1 G/1
1 INJECTION, POWDER, FOR SOLUTION INTRAVENOUS EVERY 8 HOURS
Status: DISCONTINUED | OUTPATIENT
Start: 2022-11-28 | End: 2022-11-28

## 2022-11-28 RX ORDER — HEPARIN SODIUM 5000 [USP'U]/ML
5000 INJECTION, SOLUTION INTRAVENOUS; SUBCUTANEOUS EVERY 8 HOURS
Status: DISCONTINUED | OUTPATIENT
Start: 2022-11-28 | End: 2022-11-28

## 2022-11-28 RX ADMIN — ASPIRIN 81 MG CHEWABLE TABLET 81 MG: 81 TABLET CHEWABLE at 08:19

## 2022-11-28 RX ADMIN — HEPARIN SODIUM 5000 UNITS: 5000 INJECTION INTRAVENOUS; SUBCUTANEOUS at 23:46

## 2022-11-28 RX ADMIN — SODIUM CHLORIDE, PRESERVATIVE FREE 10 ML: 5 INJECTION INTRAVENOUS at 06:10

## 2022-11-28 RX ADMIN — METOCLOPRAMIDE 5 MG: 5 INJECTION, SOLUTION INTRAMUSCULAR; INTRAVENOUS at 06:09

## 2022-11-28 RX ADMIN — SODIUM CHLORIDE, PRESERVATIVE FREE 10 ML: 5 INJECTION INTRAVENOUS at 20:45

## 2022-11-28 RX ADMIN — CASTOR OIL AND BALSAM, PERU: 788; 87 OINTMENT TOPICAL at 20:31

## 2022-11-28 RX ADMIN — METOCLOPRAMIDE 5 MG: 5 INJECTION, SOLUTION INTRAMUSCULAR; INTRAVENOUS at 12:02

## 2022-11-28 RX ADMIN — METRONIDAZOLE 500 MG: 500 INJECTION, SOLUTION INTRAVENOUS at 12:03

## 2022-11-28 RX ADMIN — FUROSEMIDE 10 MG/HR: 10 INJECTION, SOLUTION INTRAVENOUS at 02:50

## 2022-11-28 RX ADMIN — LEVOTHYROXINE SODIUM 125 MCG: 0.12 TABLET ORAL at 06:10

## 2022-11-28 RX ADMIN — SODIUM CHLORIDE 6.2 UNITS/HR: 9 INJECTION, SOLUTION INTRAVENOUS at 03:06

## 2022-11-28 RX ADMIN — PROPOFOL 20 MCG/KG/MIN: 10 INJECTION, EMULSION INTRAVENOUS at 22:04

## 2022-11-28 RX ADMIN — SODIUM CHLORIDE 200 MG: 9 INJECTION, SOLUTION INTRAVENOUS at 13:50

## 2022-11-28 RX ADMIN — CEFEPIME 2 G: 2 INJECTION, POWDER, FOR SOLUTION INTRAVENOUS at 02:50

## 2022-11-28 RX ADMIN — SERTRALINE 100 MG: 50 TABLET, FILM COATED ORAL at 08:18

## 2022-11-28 RX ADMIN — FENTANYL CITRATE 100 MCG/HR: 50 INJECTION INTRAVENOUS at 00:14

## 2022-11-28 RX ADMIN — HEPARIN SODIUM 5000 UNITS: 5000 INJECTION INTRAVENOUS; SUBCUTANEOUS at 16:27

## 2022-11-28 RX ADMIN — SODIUM CHLORIDE 6.1 UNITS/HR: 9 INJECTION, SOLUTION INTRAVENOUS at 05:18

## 2022-11-28 RX ADMIN — BUSPIRONE HYDROCHLORIDE 15 MG: 5 TABLET ORAL at 14:14

## 2022-11-28 RX ADMIN — SODIUM CHLORIDE 5.2 UNITS/HR: 9 INJECTION, SOLUTION INTRAVENOUS at 21:59

## 2022-11-28 RX ADMIN — Medication 1 MCG/KG/HR: at 17:51

## 2022-11-28 RX ADMIN — SENNOSIDES AND DOCUSATE SODIUM 1 TABLET: 50; 8.6 TABLET ORAL at 09:00

## 2022-11-28 RX ADMIN — METOCLOPRAMIDE 5 MG: 5 INJECTION, SOLUTION INTRAMUSCULAR; INTRAVENOUS at 00:31

## 2022-11-28 RX ADMIN — Medication 1 MCG/KG/HR: at 13:57

## 2022-11-28 RX ADMIN — SODIUM CHLORIDE 3.6 UNITS/HR: 9 INJECTION, SOLUTION INTRAVENOUS at 18:28

## 2022-11-28 RX ADMIN — METOCLOPRAMIDE 5 MG: 5 INJECTION, SOLUTION INTRAMUSCULAR; INTRAVENOUS at 23:47

## 2022-11-28 RX ADMIN — PROPOFOL 30 MCG/KG/MIN: 10 INJECTION, EMULSION INTRAVENOUS at 02:41

## 2022-11-28 RX ADMIN — SODIUM CHLORIDE, PRESERVATIVE FREE 40 ML: 5 INJECTION INTRAVENOUS at 15:24

## 2022-11-28 RX ADMIN — BUSPIRONE HYDROCHLORIDE 15 MG: 5 TABLET ORAL at 08:18

## 2022-11-28 RX ADMIN — IOPAMIDOL 100 ML: 755 INJECTION, SOLUTION INTRAVENOUS at 11:15

## 2022-11-28 RX ADMIN — SODIUM CHLORIDE 1 G: 9 INJECTION, SOLUTION INTRAMUSCULAR; INTRAVENOUS; SUBCUTANEOUS at 17:48

## 2022-11-28 RX ADMIN — Medication 1.2 MCG/KG/HR: at 00:43

## 2022-11-28 RX ADMIN — Medication 1 MCG/KG/HR: at 09:58

## 2022-11-28 RX ADMIN — BUSPIRONE HYDROCHLORIDE 15 MG: 5 TABLET ORAL at 20:32

## 2022-11-28 RX ADMIN — Medication 1.2 MCG/KG/HR: at 04:10

## 2022-11-28 RX ADMIN — VANCOMYCIN HYDROCHLORIDE 1250 MG: 10 INJECTION, POWDER, LYOPHILIZED, FOR SOLUTION INTRAVENOUS at 16:27

## 2022-11-28 RX ADMIN — CHLORHEXIDINE GLUCONATE 0.12% ORAL RINSE 10 ML: 1.2 LIQUID ORAL at 17:48

## 2022-11-28 RX ADMIN — SODIUM CHLORIDE 40 MG: 9 INJECTION, SOLUTION INTRAMUSCULAR; INTRAVENOUS; SUBCUTANEOUS at 08:18

## 2022-11-28 RX ADMIN — VANCOMYCIN HYDROCHLORIDE 1250 MG: 10 INJECTION, POWDER, LYOPHILIZED, FOR SOLUTION INTRAVENOUS at 04:52

## 2022-11-28 RX ADMIN — POLYETHYLENE GLYCOL 3350 17 G: 17 POWDER, FOR SOLUTION ORAL at 08:19

## 2022-11-28 RX ADMIN — Medication 1 MCG/KG/HR: at 21:13

## 2022-11-28 RX ADMIN — CASTOR OIL AND BALSAM, PERU: 788; 87 OINTMENT TOPICAL at 12:03

## 2022-11-28 RX ADMIN — HEPARIN SODIUM 17 UNITS/KG/HR: 10000 INJECTION, SOLUTION INTRAVENOUS at 06:19

## 2022-11-28 RX ADMIN — METOCLOPRAMIDE 5 MG: 5 INJECTION, SOLUTION INTRAMUSCULAR; INTRAVENOUS at 17:48

## 2022-11-28 RX ADMIN — SODIUM CHLORIDE 5.2 UNITS/HR: 9 INJECTION, SOLUTION INTRAVENOUS at 16:15

## 2022-11-28 RX ADMIN — CEFEPIME 2 G: 2 INJECTION, POWDER, FOR SOLUTION INTRAVENOUS at 12:02

## 2022-11-28 RX ADMIN — CHLORHEXIDINE GLUCONATE 0.12% ORAL RINSE 10 ML: 1.2 LIQUID ORAL at 08:19

## 2022-11-28 RX ADMIN — Medication 1 MCG/KG/HR: at 06:40

## 2022-11-28 RX ADMIN — SODIUM CHLORIDE 3.5 UNITS/HR: 9 INJECTION, SOLUTION INTRAVENOUS at 10:41

## 2022-11-28 NOTE — PROGRESS NOTES
Occupational Therapy    Chart reviewed for updates and spoke with nursing. Patient remains critically ill, intubated and sedated. Nursing in agreement with completion of therapy orders. Will complete OT orders at this time. Please re-consult OT once patient is medically stable and able to actively participate in therapy.      eD Reza, OTR/L

## 2022-11-28 NOTE — CARDIO/PULMONARY
Cardiac Rehab Note: chart review/referral      Consult has been acknowledged      NSTEMI     CABG/MVR 11/22/22     Smoking history assessed. Patient is a former smoker. Smoking Cessation Program link has not been added to the AVS.      EF  50-55   on 11/16/22 per echo     Surgical educational folder with \"Cardiac Surgery Post-Op Instructions\" book, heart healthy eating, warning signs, heart facts, heart aware, resources, and CABG Surgery booklet to Kandis Heredia on 11/23/22     Chart review 11/28/22:  Re intubated  Triple Phase Liver scan today    CP Rehab will follow.

## 2022-11-28 NOTE — PROGRESS NOTES
Surgery      Perfect serve notification of routine consult for ileus at 1815. Nurse would not come to the phone to give information. Chart reviewed. KUB this morning demonstrates persistent gastric contrast x 2 days. 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 Pt reintubated, tubefeeds held. Recommend NGT decompression for now. Full consult in am.        Faisal Waite.  Charlie Whitney MD, [facility]

## 2022-11-28 NOTE — PROGRESS NOTES
Critical Care Progress Note  Elly Romo MD          Date of Service:  2022  NAME:  Orlando Perez  :  1980  MRN:  [id number]      Subjective/Hospital course:      37 y/o male POD 1 from CABG x1 an AV valve replacement. He arrived intubated and on pressors. Extubated  on POD 0 @ 19:00. Noted to have SERGE with creatinine rise 2. Possibly secondary to hypotension in OR. Night of  was worked up for hypoxemia. This morning he tested positive for Covid. Case discussed with cT surgery. Most likely diagnosis is PE most likely secondary to hypercoaguable state from Covid. Pt had no respiratory symptoms preop or post op suggestive of URI      - Overnight developed hypoxemia. Pt had bronch and SYLWIA neither of which explained cause for hypoxemia. He required re-intubation and was put on veletri.  - intermittent desaturation events.   CXR this a.m. with pulmonary edema   - fio2 improved   : fio2 and PEEP requirement has increased, still requiring epi at 2 mcg/min   : remains on epinephrine drip, sedated, fio2 requirement decreased to 80%'LFT's are trending down        Assessment/Plan:     PULMONARY:  Acute hypoxic resopiratory failure   Covid rapid +, PCR negative  D dimer / LE doppler negative - unlikely PE  Pulmonary edema/ pleural effusions  Ct chest : no PE  Plan  - stop heparin - discussed with CTS  - Vent settings established, reviewed and/or adjusted as per orders  - Continue nebulized bronchodilators and steroids  - continue lasix drip today    CARDIOVASCULAR/HEMODYNAMIC:  Continue epi drip         Current vasopressors: Epi 5    Plan  - continue lasix drip  - ICU hemodynamic/cardiac monitoring  - MAP goal > 65 mmHg        RENAL:  SERGE    Plan  - Monitor I/Os and Uo  - Monitor renal function panel intermittently  - Correct electrolytes as needed  - Avoid nephrotoxic meds      GI/NUTRITION:  CT abdomen : normal liver vasculature  Hepatic steatosis : suspect cholestasis as the cause for the elevated LFT's    INFECTIOUS DISEASE  Covid ruled out with 2 negative PCR  Intermittent fevers  Cultures pending   Pro rosy is only slightly elevated    Micro:    Plan  - Continue broad spectrum abx   Add eraxis  Await ID consult    NEURO  Sedation for vent synchrony    ENDO : obtain thyroid function tests    Plan    - Daily SAT when meets ICU criteria  - ABCDEF mobility bundle  - PT/OT involvement when appropriate          Care Plan discussed with: attending, CTS NP and bedside nurse, GI , radiology    I personally spent 90 minutes of critical care time. This is time spent at this critically ill patient's bedside actively involved in patient care as well as the coordination of care and discussions with the patient's family. This does not include any procedural time which has been billed separately. Review of Systems:   ROS   Intubated     Vital Signs:   Patient Vitals for the past 4 hrs:   BP Temp Pulse Resp SpO2   11/28/22 1227 (!) 107/47 99.7 °F (37.6 °C) 84 20 94 %   11/28/22 1217 -- -- 85 20 95 %   11/28/22 0942 (!) 131/57 (!) 101.3 °F (38.5 °C) 82 20 93 %          Intake/Output Summary (Last 24 hours) at 11/28/2022 1249  Last data filed at 11/28/2022 1228  Gross per 24 hour   Intake 3994.34 ml   Output 7240 ml   Net -3245.66 ml          Physical Examination:    Young, well built  HEENT: normal  Heart: s1,s2  Lungs: clear  Abd: distended, no bowel sounds  Ext: no edema  Cns: sedated     Labs and Imaging:   Reviewed.       Medications:     Current Facility-Administered Medications   Medication Dose Route Frequency    balsam peru-castor oiL (VENELEX) ointment   Topical BID    [START ON 11/29/2022] Vancomycin level - please draw 11/29 prior to the 0400 dose   Other ONCE    anidulafungin (ERAXIS) 200 mg in 0.9% sodium chloride 260 mL IVPB  200 mg IntraVENous ONCE    [START ON 11/29/2022] anidulafungin (ERAXIS) 100 mg in 0.9% sodium chloride 130 mL IVPB  100 mg IntraVENous Q24H    furosemide (LASIX) 200 mg in 0.9% sodium chloride 100 mL infusion  10 mg/hr IntraVENous CONTINUOUS    vancomycin (VANCOCIN) 1250 mg in  ml infusion  1,250 mg IntraVENous Q12H    metoclopramide HCl (REGLAN) injection 5 mg  5 mg IntraVENous Q6H    heparin 25,000 units in D5W 250 ml infusion  17-36 Units/kg/hr IntraVENous TITRATE    0.9% sodium chloride infusion 250 mL  250 mL IntraVENous PRN    heparin (porcine) 1,000 unit/mL injection 4,980 Units  40 Units/kg IntraVENous PRN    Or    heparin (porcine) 1,000 unit/mL injection 9,960 Units  80 Units/kg IntraVENous PRN    pantoprazole (PROTONIX) 40 mg in 0.9% sodium chloride 10 mL injection  40 mg IntraVENous DAILY    nitroGLYcerin (Tridil) 200 mcg/ml infusion  0-200 mcg/min IntraVENous TITRATE    cefepime (MAXIPIME) 2 g in 0.9% sodium chloride (MBP/ADV) 100 mL MBP  2 g IntraVENous Q8H    metroNIDAZOLE (FLAGYL) IVPB premix 500 mg  500 mg IntraVENous Q12H    dexmedeTOMidine in 0.9 % NaCl (PRECEDEX) 400 mcg/100 mL (4 mcg/mL) infusion soln  0.1-1.5 mcg/kg/hr IntraVENous TITRATE    fentaNYL (PF) 1,500 mcg/30 mL (50 mcg/mL) infusion  0-200 mcg/hr IntraVENous TITRATE    propofol (DIPRIVAN) 10 mg/mL infusion  0-50 mcg/kg/min IntraVENous TITRATE    0.9% sodium chloride infusion  10 mL/hr IntraVENous CONTINUOUS    HYDROmorphone (DILAUDID) injection 1 mg  1 mg IntraVENous Q4H PRN    HYDROmorphone (DILAUDID) injection 0.5 mg  0.5 mg IntraVENous Q4H PRN    niCARdipine (CARDENE) 25 mg in 0.9% sodium chloride 250 mL ([id number])  5-15 mg/hr IntraVENous TITRATE    sodium chloride (NS) flush 5-40 mL  5-40 mL IntraVENous Q8H    sodium chloride (NS) flush 5-40 mL  5-40 mL IntraVENous PRN    bacitracin 500 unit/gram packet 1 Packet  1 Packet Topical PRN    0.45% sodium chloride infusion  10 mL/hr IntraVENous CONTINUOUS    naloxone (NARCAN) injection 0.4 mg  0.4 mg IntraVENous PRN    ondansetron (ZOFRAN) injection 4 mg  4 mg IntraVENous Q4H PRN    albuterol (PROVENTIL VENTOLIN) nebulizer solution 2.5 mg  2.5 mg Nebulization Q4H PRN    aspirin chewable tablet 81 mg  81 mg Oral DAILY    midazolam (VERSED) injection 1 mg  1 mg IntraVENous Q1H PRN    chlorhexidine (PERIDEX) 0.12 % mouthwash 10 mL  10 mL Oral BID    calcium chloride 1 g in 0.9% sodium chloride 250 mL IVPB  1 g IntraVENous PRN    polyethylene glycol (MIRALAX) packet 17 g  17 g Oral DAILY    senna-docusate (PERICOLACE) 8.6-50 mg per tablet 1 Tablet  1 Tablet Oral DAILY    ELECTROLYTE REPLACEMENT NOTE: Nurse to review Serum Potassium and Magnesuim levels and Initiate Electrolyte Replacement Protocol as needed  1 Each Other PRN    magnesium sulfate 1 g/100 ml IVPB (premix or compounded)  1 g IntraVENous PRN    glucose chewable tablet 16 g  4 Tablet Oral PRN    glucagon (GLUCAGEN) injection 1 mg  1 mg IntraMUSCular PRN    insulin lispro (HUMALOG) injection   SubCUTAneous AC&HS    insulin glargine (LANTUS) injection 1-50 Units  1-50 Units SubCUTAneous ONCE OVER 24 HOURS    lactated ringers bolus infusion 250 mL  250 mL IntraVENous Q1H PRN    dextrose 10 % infusion 0-250 mL  0-250 mL IntraVENous PRN    melatonin tablet 3-6 mg  3-6 mg Oral QHS PRN    EPINEPHrine (ADRENALIN) 5 mg in 0.9% sodium chloride 250 mL infusion  0-10 mcg/min IntraVENous TITRATE    lidocaine 4 % patch 2 Patch  2 Patch TransDERmal Q24H    insulin regular (NOVOLIN R, HUMULIN R) 100 Units in 0.9% sodium chloride 100 mL infusion  1-25 Units/hr IntraVENous TITRATE    levothyroxine (SYNTHROID) tablet 125 mcg  125 mcg Oral 6am    LORazepam (ATIVAN) tablet 1 mg  1 mg Oral Q8H PRN    sertraline (ZOLOFT) tablet 100 mg  100 mg Oral DAILY    busPIRone (BUSPAR) tablet 15 mg  15 mg Oral TID     ______________________________________________________________________  EXPECTED LENGTH OF STAY: 1d 19h  ACTUAL LENGTH OF STAY:          MD Reba   Pulmonary/CCM  [address] [phone]

## 2022-11-28 NOTE — PROGRESS NOTES
Interdisciplinary team rounds were held 11/28/2022 with the following team members:Care Management, Diabetes Treatment Specialist, Nursing, Nutrition, Pharmacy, Physician, and Clinical Coordinator. Plan of care discussed. See clinical pathway and/or care plan for interventions and desired outcomes. Goals of the Day: CTA of abdomen.

## 2022-11-28 NOTE — CONSULTS
Surgery Consult    Subjective:      Moisés Whiting is a 36 y.o. male s/p AVR, ascending arch aneurysm repair on 11/22, who we are consulted for for ongoing ileus. Pt is severely critically ill reintubated in ICU with severe hepatitis of uncertain etiology, pending triple phase liver scan. Plain films yesterday morning demonstrated oral contrast given 2 days prior still in the stomach with relatively little bowel gas and no obstructive pattern. GI evaluation for hepatitis ongoing, this is currently the pressing issue.     Past Medical History:   Diagnosis Date    Anxiety and depression     anxiety, depression    Bleeding of eye, left     8/17/21 pt reports receiving injections in right eye for beeding    Chronic headaches 2007    COVID-19 12/20/2020    Diabetes type 1, uncontrolled     since 8years old    GERD (gastroesophageal reflux disease)     Hypercholesterolemia     Hypertension     Hypothyroidism     MI (myocardial infarction) ([facility] [address]     as of 8/17/21 pt reports 9 stents total    WALLY on CPAP      Past Surgical History:   Procedure Laterality Date    HX CARPAL TUNNEL RELEASE Left 2012    HX CARPAL TUNNEL RELEASE Right 2018    CTE trigger thumb and index finger    HX HEART CATHETERIZATION      HX HEENT      HX LAP CHOLECYSTECTOMY  8/26/14    Dr Isidro Celeste    HX WISDOM TEETH EXTRACTION        Family History   Problem Relation Age of Onset    Diabetes Mother     Hypertension Mother     Heart Disease Father     Cancer Father     Diabetes Father     Other Father         MAC    Diabetes Paternal Aunt     Diabetes Maternal Grandmother     Thyroid Cancer Maternal Grandmother     Kidney Disease Maternal Grandmother     Arthritis Maternal Grandmother     Heart Disease Maternal Grandfather     High Cholesterol Maternal Grandfather     Hypertension Maternal Grandfather     Diabetes Paternal Grandmother     Hemophilia Paternal Grandfather      Social History     Tobacco Use    Smoking status: Former     Packs/day: 0.00 Years: 14.00     Pack years: 0.00     Types: Cigarettes     Quit date: 2014     Years since quittin.9    Smokeless tobacco: Never   Substance Use Topics    Alcohol use: No      Prior to Admission medications    Medication Sig Start Date End Date Taking? Authorizing Provider   metFORMIN (GLUCOPHAGE) 500 mg tablet TAKE 1 TABLET BY MOUTH TWICE DAILY WITH MEALS 22  Yes Lisbeth Toure MD   insulin glargine U-300 conc (Toujeo Max U-300 SoloStar) 300 unit/mL (3 mL) inpn Take 140 units every day (new dosage) 10/31/22  Yes Lisbeth Toure MD   LORazepam (ATIVAN) 1 mg tablet Take 1 Tablet by mouth every eight (8) hours as needed for Anxiety. Max Daily Amount: 3 mg. 10/13/22  Yes Adele Fernandez NP   sertraline (ZOLOFT) 100 mg tablet Take 1 Tablet by mouth daily. 10/13/22  Yes Adele Fernandez NP   sertraline (ZOLOFT) 50 mg tablet Take 1 Tablet by mouth daily. 10/13/22  Yes Aurelia Fernandez NP   alum-mag hydroxide-simeth (MYLANTA) 200-200-20 mg/5 mL susp Take 30 mL by mouth every four (4) hours as needed for Indigestion. 22  Yes Bella Candelario MD   levothyroxine (SYNTHROID) 125 mcg tablet Take 1 Tablet by mouth Daily (before breakfast). 22  Yes Lisbeth Toure MD   insulin regular (NOVOLIN R, HUMULIN R) 100 unit/mL injection 40 units with each meal, plus correction. Max daily dose of 150 units. 22  Yes Lisbeth Toure MD   busPIRone (BUSPAR) 15 mg tablet Take 1 Tablet by mouth three (3) times daily. 22  Yes Katie Halsted, MD   Insulin Needles, Disposable, 32 gauge x 532\" ndle 5 shots per day (dispense whatever brand is covered by his insurance) 22  Yes Lisbeth Toure MD   lisinopriL (PRINIVIL, ZESTRIL) 10 mg tablet Take 10 mg by mouth daily. 22  Yes Provider, Historical   testosterone (ANDROGEL) 20.25 mg/1.25 gram (1.62 %) gel Apply 4 pump presses daily. Ok to dispense generic testosterone.  22  Yes Lisbeth Toure MD butalbital-acetaminophen-caffeine (FIORICET, ESGIC) -40 mg per tablet Take 1 Tablet by mouth every six (6) hours as needed for Headache. Indications: a migraine headache 6/13/22  Yes Kavon Lowe, ANP-C   esomeprazole (NEXIUM) 40 mg capsule TAKE 1 CAPSULE BY MOUTH ONCE DAILY BEFORE BREAKFAST DO NOT OPEN CAPSULE 4/27/22  Yes Provider, Historical   metoprolol succinate (TOPROL-XL) 25 mg XL tablet Take 25 mg by mouth two (2) times a day. Yes Provider, Historical   rosuvastatin (CRESTOR) 40 mg tablet Take 40 mg by mouth daily. 2/16/22  Yes Provider, Historical   famotidine (PEPCID) 40 mg tablet Take 1 Tablet by mouth daily. 1/19/22  Yes Gonzalez Beckett MD   clopidogreL (PLAVIX) 75 mg tab Take 1 Tablet by mouth daily. 10/29/21  Yes Dennis Raygoza MD   cholecalciferol (Vitamin D3) (5000 Units/125 mcg) tab tablet Take 1 Tab by mouth daily. 3/10/21  Yes Gonzalez Beckett MD   Insulin Syringe-Needle U-100 (BD Insulin Syringe) 1 mL 25 x 1\" syrg Use for drawing up/injecting testosterone once weekly. 12/7/20  Yes Kalyan Campbell MD   nitroglycerin (NITROSTAT) 0.4 mg SL tablet Take 1 Tab by mouth every five (5) minutes as needed for Chest Pain. Sit down then put one tab under the tongue every 5 minutes as needed 12/4/19  Yes Sarah Castellanos MD   aspirin delayed-release 81 mg tablet Take 1 Tab by mouth daily. 9/24/19  Yes Regine Verduzco MD   insulin U-500 syringe-needle (BD Insulin Syringe U-500) 1/2 mL 31 gauge x 15/64\" syrg Three shots per day. 6/29/22   Kalyan Campbell MD      Allergies   Allergen Reactions    Gabapentin Swelling     Of feet    Morphine Nausea and Vomiting    Penicillin G Swelling    Percocet [Oxycodone-Acetaminophen] Hives and Nausea and Vomiting     Pt is allergic to Oxycodone, has previously tolerated Tylenol and Hydrocodone.     Sulfa (Sulfonamide Antibiotics) Swelling    Tramadol Nausea Only     Nausea and headache         Review of Systems   Unable to perform ROS: Intubated Objective:     Patient Vitals for the past 8 hrs:   BP Temp Pulse Resp SpO2   22 0942 (!) 131/57 (!) 101.3 °F (38.5 °C) 82 20 93 %   22 0832 -- -- 81 20 94 %   22 0825 (!) 156/60 (!) 100.6 °F (38.1 °C) 80 11 96 %   22 0737 -- 100 °F (37.8 °C) 77 20 94 %   22 0722 -- 99.9 °F (37.7 °C) 76 20 95 %   22 0707 -- 99.9 °F (37.7 °C) 75 20 95 %   22 0700 -- 99.7 °F (37.6 °C) 77 20 96 %   22 0610 -- 99.3 °F (37.4 °C) 72 18 94 %   22 0555 (!) 112/41 99.1 °F (37.3 °C) 72 18 95 %   22 0552 -- 99.1 °F (37.3 °C) 72 18 95 %   22 0540 -- 99.1 °F (37.3 °C) 72 18 94 %   22 0537 (!) 94/48 99.1 °F (37.3 °C) 73 20 94 %   22 0522 (!) 121/53 99.1 °F (37.3 °C) 72 18 96 %   22 0507 -- 99.1 °F (37.3 °C) 73 10 --   22 0452 -- 99 °F (37.2 °C) 73 20 97 %   22 0437 -- 98.8 °F (37.1 °C) 76 20 97 %   22 0422 -- 98.6 °F (37 °C) 72 20 97 %   22 0407 -- 98.6 °F (37 °C) 72 20 97 %   22 0341 -- -- 70 20 97 %   22 0337 -- 98.4 °F (36.9 °C) 70 20 97 %   22 0322 -- 98.2 °F (36.8 °C) 70 20 97 %   22 0307 -- 98.2 °F (36.8 °C) 70 20 97 %   22 0252 -- 98.1 °F (36.7 °C) 68 20 98 %   22 0237 -- 98.1 °F (36.7 °C) 68 20 98 %   22 0222 -- 97.9 °F (36.6 °C) 68 20 98 %       Temp (24hrs), Av.4 °F (37.4 °C), Min:97.7 °F (36.5 °C), Max:102.7 °F (39.3 °C)      Physical Exam  Constitutional:       General: He is not in acute distress. Appearance: He is well-developed. He is obese. He is not toxic-appearing. HENT:      Head: Normocephalic and atraumatic. Eyes:      General: No scleral icterus. Pupils: Pupils are equal, round, and reactive to light. Cardiovascular:      Rate and Rhythm: Normal rate and regular rhythm. Heart sounds: Normal heart sounds. Pulmonary:      Breath sounds: Normal breath sounds. No wheezing or rales. Abdominal:      General: Abdomen is protuberant.  Bowel sounds are normal. There is no distension. Palpations: Abdomen is soft. There is no mass. Tenderness: There is no abdominal tenderness. There is no guarding or rebound. Comments: Abdomen very soft, not tympanitic. Difficult to assess for organomegally   Musculoskeletal:         General: Normal range of motion. Lymphadenopathy:      Cervical: No cervical adenopathy. Neurological:      General: No focal deficit present. Mental Status: He is oriented to person, place, and time. Assessment:     35 yo man 6 days s/p AVR and ascending arch repair with severe acute hepatitis. No evidence of bowel obstruction and clinically some ileus/delayed gastric emptying. Plan:     Continue NGT decompression for now.   Once scan complete, could consider advancing tube and trying post-pyloric tube feeds at trophic rate

## 2022-11-28 NOTE — PROGRESS NOTES
[facility] ICU Progress Note    Admit Date: 11/15/2022  POD:  6 Day Post-Op    Procedure:  Procedure(s):  SYLWIA BY DR Neha Holloway -  CORONARY ARTERY BYPASS GRAFT  X 1 WITH LEFT INTERNAL MAMMARY ARTERY GRAFTING - AORTIC VALVE REPLACEMENT WITH MEYER 25MM INSPIRIS RESILIA TISSUE VALVE AND REPAIR OF ASCENDING AORTIC ANEURYSM        Subjective/Interval:   Pt seen with Dr. Kacey Marlow. Pt intubated and sedated. On Vent 80% PEEP 12. Tmax 102.4, down to 100 this am off cooling blanket at present time. On Epi @ 3, Lasix @ 10mg/hr, Insulin and Heparin gtts, Dex @ 1, Fent @ 100, Prop @ 25      Objective:   Vitals:  Blood pressure (!) 112/41, pulse 77, temperature 100 °F (37.8 °C), resp. rate 20, height 5' 9\" (1.753 m), weight 274 lb 7.6 oz (124.5 kg), SpO2 94 %.   Temp (24hrs), Av.2 °F (37.3 °C), Min:96.8 °F (36 °C), Max:102.7 °F (39.3 °C)    Hemodynamics:   CO: CO (l/min): 5.1 l/min   CI: CI (l/min/m2): 2.3 l/min/m2   CVP: CVP (mmHg): 15 mmHg (22 0737)   SVR: SVR (dyne*sec)/cm5: 749 (dyne*sec)/cm5 (22 5325)   PAP Systolic: PAP Systolic: 32 (37/ 2449)   PAP Diastolic: PAP Diastolic: 19 (73/36/37 5152)   PVR:     SV02: SVO2 (%): 48 % (22 1520)   SCV02:      EKG/Rhythm:    EKG Results       Procedure 720 Value Units Date/Time    EKG, 12 LEAD, INITIAL [[id number]]     Order Status: Sent     EKG, 12 LEAD, INITIAL [[id number]] Collected: 22 1532    Order Status: Completed Updated: 22 1017     Ventricular Rate 77 BPM      Atrial Rate 77 BPM      P-R Interval 164 ms      QRS Duration 82 ms      Q-T Interval 370 ms      QTC Calculation (Bezet) 418 ms      Calculated P Axis 49 degrees      Calculated R Axis -6 degrees      Calculated T Axis 108 degrees      Diagnosis --     Normal sinus rhythm  Minimal voltage criteria for LVH, may be normal variant ( R in aVL )  Nonspecific ST and T wave abnormality  Poor R-wave Progression (consider lead placement or loss of anterior forces)    Confirmed by Yrn Rousseau MD, Tho Gray ([id number]) on 11/18/2022 10:17:13 AM      EKG, 12 LEAD, INITIAL [[id number]] Collected: 11/15/22 1728    Order Status: Completed Updated: 11/16/22 1136     Ventricular Rate 83 BPM      Atrial Rate 83 BPM      P-R Interval 160 ms      QRS Duration 84 ms      Q-T Interval 358 ms      QTC Calculation (Bezet) 420 ms      Calculated P Axis 50 degrees      Calculated R Axis 3 degrees      Calculated T Axis 126 degrees      Diagnosis --     Normal sinus rhythm  Left ventricular hypertrophy with repolarization abnormality  When compared with ECG of 14-SEP-2022 12:27,  No significant change was found  Confirmed by Bryce Correa ([id number]) on 11/16/2022 11:35:53 AM              CT Output: 70mL in 24 hrs    Ventilator:  Ventilator Volumes  Vt Set (ml): 450 ml (11/28/22 0341)  Vt Exhaled (Machine Breath) (ml): 477 ml (11/28/22 0341)  Vt Spont (ml): 455 ml (11/23/22 2349)  Ve Observed (l/min): 9.54 l/min (11/28/22 0341)    Oxygen Therapy:  Oxygen Therapy  O2 Sat (%): 94 % (11/28/22 0737)  Pulse via Oximetry: 76 beats per minute (11/28/22 0737)  O2 Device: Endotracheal tube (11/27/22 1520)  Skin Assessment: Clean, dry, & intact (11/27/22 1645)  Skin Protection for O2 Device: N/A (11/26/22 0400)  O2 Flow Rate (L/min): 6 l/min (11/24/22 1600)  FIO2 (%): 80 % (11/28/22 0341)    CXR  CXR Results  (Last 48 hours)                 11/28/22 0457  XR CHEST PORT Final result    Impression:  No significant change. Narrative:  INDICATION: post op       EXAMINATION:  AP CHEST, PORTABLE       COMPARISON: 11/27/2022       FINDINGS: Single AP portable view of the chest demonstrates no change in   position of the lines and tubes. The cardiomediastinal silhouette is unchanged. Bibasilar atelectasis/effusions without significant change. No pneumothorax. 11/27/22 0903  XR CHEST PORT Final result    Impression:  Left lower lobe atelectasis or pneumonic infiltrate. Support lines   in expected positions as above.        Narrative: EXAM:  XR CHEST PORT       INDICATION: Postop. COMPARISON: Chest x-ray 11/26/2022. CT 11/16/2022. TECHNIQUE: Single portable chest AP view       FINDINGS: Endotracheal tube, enteric tube, and Butler-Yuly catheter remain in   stable and expected position. There are median sternotomy wires. The cardiac   silhouette is within normal limits. The pulmonary vasculature is within normal   limits. The lungs and pleural spaces show left basilar opacity but otherwise are clear. The visualized bones and upper abdomen are age-appropriate. KUB 11/27/2022:  IMPRESSION  Positive bowel gas limits evaluation of bowel loops with some  colonic gas and stool seen and no interval antegrade progression of gastric  luminal contrast material over the last 2 days. ABD US 11/27/2022:  IMPRESSION  Increased hepatic echogenicity compatible with diffuse  hepatocellular process, most commonly seen in steatosis. Admission Weight: Last Weight   Weight: 257 lb 15 oz (117 kg) Weight: 274 lb 7.6 oz (124.5 kg)     Intake / Output / Drain:  Current Shift: 11/28 0701 - 11/28 1900  In: -   Out: 325 [Urine:325]  Last 24 hrs.:   Intake/Output Summary (Last 24 hours) at 11/28/2022 0803  Last data filed at 11/28/2022 0737  Gross per 24 hour   Intake 3840.04 ml   Output 7220 ml   Net -3379.96 ml     EXAM:  General: Intubated, sedated. Lungs:    Coarse to auscultation bilaterally   Incision:  Incision clean, dry and intact and prineo intact   Heart:   Regular rate and rhythm  and no murmurs, rubs or gallops   Abdomen:    Distended, firm, hypoactive bowel sounds, and obese. Extremities:  +1-2 pitting edema BLE, BUE   Neurologic:  Intubated, sedated.      Labs:   Recent Labs     11/28/22  0729 11/28/22  0620 11/28/22  0600   WBC  --   --  16.5*   HGB  --   --  9.2*   HCT  --   --  28.0*   PLT  --   --  212   NA  --   --  138   K  --   --  4.2   BUN  --   --  43*   CREA  --   --  1.29   GLU  --   --  119*   GLUCPOC 111   < >  --    INR  --   --  1.3*    < > = values in this interval not displayed. Assessment:     Active Problems:    Acute non-Q wave non-ST elevation myocardial infarction (NSTEMI) ([address] (11/15/2022)      Aortic stenosis (11/22/2022)      CAD (coronary artery disease) (11/22/2022)      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)      Overview: On pump CORONARY ARTERY BYPASS GRAFT  X 1 WITH LEFT INTERNAL MAMMARY       ARTERY to LAD      AORTIC VALVE REPLACEMENT WITH MEYER 25MM INSPIRIS RESILIA TISSUE VALVE       REPAIR OF ASCENDING AORTIC ANEURYSM with 26mm hemashield graft      S/P AVR (aortic valve replacement) and aortoplasty (11/22/2022)      Overview: On pump CORONARY ARTERY BYPASS GRAFT  X 1 WITH LEFT INTERNAL MAMMARY       ARTERY to LAD      AORTIC VALVE REPLACEMENT WITH MEYER 25MM INSPIRIS RESILIA TISSUE VALVE       REPAIR OF ASCENDING AORTIC ANEURYSM with 26mm hemashield graft       Plan/Recommendations/Medical Decision Making:     Severe symptomatic AS, s/p tissue AVR. Continue ASA. CAD, STEMI, s/p CABG. Continue ASA. Holding amio, statin for transaminitis. Consider BB when off pressors. repeat Echo without effusion or tamponade, NL LV/RVF, Will titrate epi for map of 80, maintain CI greater than 2.0. Acute hypoxic respiratory failure:developed hypoxia at approximately 11pm on 11/24 requiring Bipap and was ultimately re-intubated at 0200 for acute respiratory failure. Bronchoscopy was completed 11/24 and was unremarkable. SYLWIA was completed 11/24 with normal RV and LVF without effusion or shunting. CTA was no completed at that time d/t SREGE. Heparin gtt for possible PE discontinued then resumed. CXR unchanged, will transitioned to a Lasix gtt goal for 100cc net negative per hour. -3L. Continues on broad spectrum antibiotics. Will get CTA chest while down for CTA abd  Acute kidney injury. Crt up to 2.01, down to 1.29 this am. Nephrology consult appreciated, avoid nephrotoxins, trend.    Acute blood loss anemia: H&H 7.7/24.2. Given poor hemodynamics will transfuse 1 unit of PRBC. Transaminitis. Acute Severe Hepatitis, GI consult appreciated, patient now with hepatomegaly and splenomegaly, ? For possible acute thrombosis, resumed heparin. Viral panel, LDH pending. TF stopped for now. Will go for Triple phase liver scan today per GI recs. Keep NGT to suction. Leukocytosis. WBC 20.6, 16.5 this am. febrile, Tmax 102.7, downto 100 off cooling blanket this am, cont Flagyl, vanco, and cefepime. BC, Sputum and urine cultures NTD. Will consult ID, repeat BC sent, cooling blanket as needed. HTN. On ACEi, BB PTA; resume BB when appropriate. Some hypertension while on Epi, will add Cardene gtt if SBP > 160  HLD. Continue statin. WALLY , not on CPAP. Was unable to tolerate due to anxiety; he has been working on this with Sleep Medicine. OP follow-up. DMII. On Toujeo at home. Repeat A1C 9.0- Insulin gtt per protocol. Diabetes management following. Hypothyroidism. On Synthroid PTA; continue. Repeat TSH 1.96. GERD. Continue PPI. Anxiety and depression. On Buspar, Zoloft; continue. Supportive care. Nutrition: holding feeds at this time per GI recs  Ileus: NGT to suction     DVT ppx- SCDs  Dispo- ICU. Obtain Triple Phase Liver scan today, assess spleen and lungs while down. Consult ID today, not consulted over the weekend.        Signed By: José Miguel Cai, NP

## 2022-11-28 NOTE — DIABETES MGMT
[facility]    CLINICAL NURSE SPECIALIST CONSULT     Initial Presentation   Fernando Ochoa is a 36 y.o. male admitted 11/15/22 after experiencing chest pain. Afebrile. Hypertensive. 02 sats 100%  LAB: WBC 8.7. Normal H&H. /AG 3. Normal kidney & liver parameters. NT pro-; troponin 714  CXR:  Mild interstitial pulmonary edema versus interstitial pneumonia. No   pneumothorax. Consider PA and lateral chest views when the patient can better   tolerate. HX:   Past Medical History:   Diagnosis Date    Anxiety and depression     anxiety, depression    Bleeding of eye, left     8/17/21 pt reports receiving injections in right eye for beeding    Chronic headaches 2007    COVID-19 12/20/2020    Diabetes type 1, uncontrolled     since 8years old    GERD (gastroesophageal reflux disease)     Hypercholesterolemia     Hypertension     Hypothyroidism     MI (myocardial infarction) [address] [address]     as of 8/17/21 pt reports 9 stents total    WALLY on CPAP       INITIAL DX:   Acute non-Q wave non-ST elevation myocardial infarction (NSTEMI) ([facility] [address] [I21.4]  Aortic stenosis [I35.0]  CAD (coronary artery disease) [I25.10]     Current Treatment     TX: 11/22/22 On pump CORONARY ARTERY BYPASS GRAFT  X 1 WITH LEFT INTERNAL MAMMARY ARTERY to LAD  AORTIC VALVE REPLACEMENT WITH MEYER 25MM INSPIRIS RESILIA TISSUE VALVE   REPAIR OF ASCENDING AORTIC ANEURYSM with 26mm hemashield graft    Consulted by Provider for advanced diabetes nursing assessment and care for:   [x] Transitioning off Clarissa    [x] Inpatient management strategy  [] Home management assessment  [] Survival skill education    Hospital Course   Clinical progress has been complicated by need for ICU level of care. 11/24/22 Overnight developed hypoxemia. Underwent bronch and SYLWIA, neither of which explained cause for hypoxemia. He required re-intubation and was put on veletri. 11/25/22 Intermittent desaturation events. CXR this a.m. with pulmonary edema  11/27/22 Concern for hepatic ischemia/infarction with progressive trnasaminitis. 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 Max daily dose of 150 units.            Diabetes self-management practices:   Eating pattern - Eats 3 meals on most days and snacks in the evening   [x] Not eating a carbohydrate-controlled mealplan  Physical activity pattern   [x] Not employing a physical activity program to control BG  Monitoring pattern   [x] Testing BGs   [x] Breakfast 300s  [x] Lunch  100-200s  [x] Dinner  100-200s  Taking medications pattern  [x] Consistent administration  [x] Affordable  Overall evaluation:    [x] Not achieving A1c target with drug therapy & self-care practices    Subjective   Intubated     Objective   Physical exam  General Obese male who is quite ill  Neuro  On Propofol, Precedex & Fentanyl infusions  Vital Signs Visit Vitals  BP (!) 131/57 (BP 1 Location: Right lower arm, BP Patient Position: At rest;Lying)   Pulse 82   Temp (!) 101.3 °F (38.5 °C)   Resp 20   Ht 5' 9\" (1.753 m)   Wt 124.5 kg (274 lb 7.6 oz)   SpO2 93%   BMI 40.53 kg/m²     Laboratory  Recent Labs     11/28/22  0600 11/27/22  1652 11/27/22  1534 11/27/22  0425   *  --  109* 121*   AGAP 8  --  5 5   WBC 16.5* 13.9*  --  13.1*   CREA 1.29  --  1.47* 1.15   AST 1,010*  --  1,433* >2,000*   ALT 1,110*  --  1,124* 1,137*     Factors impacting BG management  Factor Dose Comments   Nutrition:  NPO    Marked abdominal distention - for CT   Drugs:  Vasopressor load  Atypical antipsychotics   Epi infusion  Buspar 3XB. Zoloft D   Affects insulin delivery     Pain Dilaudid infusion    Infection Cefepime Q8 hrs  Flagyl Q12 hrs  Eraxis Q24 hrs Fever. WBC elevated   Other:   Kidney function  Liver function  NT pro-BNP   eGFR >60/creatinine 1.29  AST/ALT >1000  2833 (11/24/22)      Blood glucose pattern      Significant diabetes-related events over the past 24-72 hours  11/15/22 Admission   Total insulin requirements without achieving target Bgs until 11/20/22:    11/21/22 Started on [employer]  11/23/22 Using 5-10 units/hr of insulin this morning  11/28/22 Using 2-6 units/hr of insulin    Assessment and Plan   Nursing Diagnosis Risk for unstable blood glucose pattern   Nursing Intervention Domain 525 Decision-making Support   Nursing Interventions Examined current inpatient diabetes/blood glucose control   Explored factors facilitating and impeding inpatient management  Explored corrective strategies with patient and responsible inpatient provider   Informed patient of rational for insulin strategy while hospitalized     Evaluation   This 36year old  male with known CAD was admitted with chest pain; he underwent CABG & AVR. Patient has known Type 1 diabetes with poor control going into this surgery. Patient required 260 units/D prior to the surgery (during this admission) without consistently achieving BG targets. Has been on Glucostabilizer since surgery 11/22/22; using 2-6 units/hour.  Very ill.    Recommendations     [x] Glucostabilizer until insulin needs less than 3 units/hr    Billing Code(s)   [x] 16097 IP subsequent hospital care - 35 minutes     Before making these care recommendations, I personally reviewed the hospitalization record, including notes, laboratory & diagnostic data and current medications, and examined the patient at the bedside (circumstances permitting) before making care recommendations. More than fifty (50) percent of the time was spent in patient counseling and/or care coordination.   Total minutes: [email or web address], CNS  Diabetes Clinical Nurse Specialist  [facility]  Access via [address] [place] [address]

## 2022-11-28 NOTE — PROGRESS NOTES
Critical Care Note      Cardiac surgery was called for the evaluation and management of: acute respiratory failure; acute hepatitis; sepsis    The critical nature of the patient's condition includes the following: emergent reintubation and multisystem organ failure      Diagnosis for which the procedure was performed: AS     Procedure performed: AVR Asc CABGx1     Other critical care diagnoses that are being treated and are above and beyond the standard care for the procedure being performed:     Severe acute hepatitis  Colonic distention  Delayed gastric emptying        HPI: Patient underwent an uncomplicated AVR Ascending aortic replacement but experienced refractory hypoxemia and was emergently reintubated the night of POD1. He has had fevers, acute hepatitis and  prolonged respiratory failure of unexplained etiology. Acute hypoxemic respiratory failure  Fio2 to 70%  PEEP 12  CTA neg for PE (will stop hep gtt)  SYLWIA normal, repeat TTE OK; no shunt  Bronch performed on night of reintubation without plug, secretions  Small bilateral effusions  Negative fluid balance with lasix gtt        Severe acute hepatitis  LFTS >2000 yesterday  Adequate perfusion, normal lactate  SYLWIA with normal biv function and normal AVR prosthesis  Concern for hepatic thrombus - negative on CTA today  Will continue to optimize perfusion and keep aggressive diuresis     Sepsis / Fever of unknown origin  All cultures negative to date  Re-sent blood cultures yesterday  Will discuss with ID  On vanc cefepime flagyl now eracsis  Change lines ? Discussed plan with ICU physician and bedside nurse     Total critical care time - 80 minutes (CPT 67078, [id number])      I personally spent the above critical care time. This is time spent at this critically ill patient's bedside / unit / floor actively involved in patient care as well as the coordination of care. This does not include any procedural time which has been billed separately.   This does not include any NP/PA patient care time. I had a face to face encounter with the patient, reviewed and interpreted patient data including clinical events, labs, images, vital signs, I/O's, and examined patient. I have discussed the case and the plan and management of the patient's care with the consulting services and bedside nurses. This patient has a high probability of imminent, clinically significant deterioration, which requires the highest level of preparedness to intervene urgently. I participated in the decision-making and personally managed or directed the management of life and organ supporting interventions to treat or prevent imminent deterioration. This patient has a critical illness or injury that is acutely impairing one or more vital organ systems such that there is a high probability of imminent or life threatening deterioration in the patient's condition. This patient requires high complexity medical decision making to assess, manipulate, and support vital organ system failure. After stabilization, this patient still requires management to prevent further life / organ threatening deterioration.

## 2022-11-28 NOTE — PROGRESS NOTES
GI PROGRESS NOTE        NAME:   Leatha Guzman       :    1980       MRN:    [id number]     Assessment/Plan     Mr. Ann Whiting is a 45yo with T2DM on insulin A1C 9-10, hypothyroidism on LT4, s/p on-pump CABG 2022 with tissue aortic valve replacement and ascending aortic aneurysm repair due to bicuspic AV, COVID positive rapic PCR negative , worsening hypoxemic respiratory failure s/p reintubation 2022 now with worsening PEEP and FiO2 requirements, severe SERGE  Cr of 2 now 1.33, with severe acute hepatitis. Personally reviewed CT triple phase liver with Dr. Luke Pappas of interventional radiology. No hepatic vein dilation, no clots, portal venous system intact and no clot, no hepatic infarct. This is good news. Awaiting serologies for other causes of liver injury. Jung Beatris #s personally reviewed and are not concerning. Needs daily INR and LFTs. With fever 102.4, awaiting Bcx. Peripheral smear with erythroleukamoid rxn nonspecific but no hemolysis. Discussed at length with ICU team, interventional radiology, RN. Prognosis is guarded. Remains critically ill and is at high risk of decompensation if not death. **    Recall:  Reviewed all catheterization and operative notes since admission. US 2022 with hepatomegaly with diffuse echocenicity limited views, as well as splenomegaly. CT 2022 with normal appearing liver and spleen. 11/15/2022, and even September and April of  and 2021 with normal LFTs. Since pm he has had progressive worsening of transaminitis in the 100-300range ALT, 260-350 AST with intermittent mild hyperbilirubinemia of 1.1-1.3.      2022 LFTs alb 2.7, Tb 1.2, , ,   2022 LFTs alb 2.7, Tb 1.1, ALT 1137, AST >2000,      He has had mild thrombmocytopenia ranging 144-183 throughout  and the trough was 107 on . No INR since  and it was normal at 1.1. This is a critical hepatitis, but his liver function appears intact so far. Usually if ischemic from surgery, the peak is sooner after the event then downtrends. He did have some mild worsening hepatitis which peaked 11/24 and was down trending slowly. He has an acute critical hepatitis insult. Unlikely to be an acute viral infection, but will send studies. Main concern is an acute thrombosis or severe congestion now that he has splenomegaly and diffuse congestion in liver. Reviewed MAR at length for the past 10 days. Minimal use of tylenol, as well as amiodarone started 11/23. He has been on continuous high-intensity statin rosuvastatin 40mg daily, recommend temporary holding of this. If possible, recommend holding amiodarone. I do recommend empiric NAC protocol. Will defer to ICU team to order as there may be issues with volume overload. Will send serologies for viral hepatitis infections, including hepatitis D superinfection and HSV (strongly doubt), but also CMV and EBV. Will send autoimmune hepatitis studies. Again, I doubt this is a cause. There is a preliminary read of limited TTE by Dr. Amy Turner today - normal appearing R heart function and trace pericardial effusion. That is good news. His mean PAP on Bryan catheter has been slowly increasing the past few days and is >30. Patient Active Problem List   Diagnosis Code    DM (diabetes mellitus) ([facility] [address] E11.9    Acquired hypothyroidism E03.9    Essential hypertension I10    Fibromyalgia M79.7    Microalbuminuria R80.9    Anxiety F41.9    Migraine without aura G43.009    Hypertriglyceridemia E78.1    Severe obesity (HCC) E66.01    Transient ischemic attack involving left internal carotid artery G45. 1    Chest pain R07.9    Unstable angina (HCC) I20.0    Coronary artery disease involving native coronary artery of native heart with unstable angina pectoris (HCC) I25.110    Type 2 diabetes with nephropathy (HCC) E11.21    Dysthymia F34.1    Adjustment disorder with mixed emotional features F43.29    Anxiety disorder due to general medical condition with panic attack F06.4, F41.0    Insomnia disorder with non-sleep disorder mental comorbidity G47.00    Dizzy spells R42    Multiple lacunar infarcts (HCC) I63.81    Cervical spondylosis with radiculopathy M47.22    Low back pain M54.50    Corneal abrasion, right S05. 01XA    Stable proliferative diabetic retinopathy of both eyes associated with type 1 diabetes mellitus ([facility] [address] D43.2930    Bilateral carotid artery stenosis I65.23    Acute non-Q wave non-ST elevation myocardial infarction (NSTEMI) ([facility]) I21.4    Aortic stenosis I35.0    CAD (coronary artery disease) I25.10    S/P CABG x 1 Z95.1    S/P AVR (aortic valve replacement) and aortoplasty Z95.2       Subjective:     Remains critically ill with significant FiO2 requirement. Objective:     VITALS:   Last 24hrs VS reviewed since prior hospitalist progress note. Most recent are:  Visit Vitals  BP (!) 141/66 (BP 1 Location: Right lower arm, BP Patient Position: At rest;Lying)   Pulse 72   Temp 97.3 °F (36.3 °C)   Resp 20   Ht 5' 9\" (1.753 m)   Wt 124.5 kg (274 lb 7.6 oz)   SpO2 96%   BMI 40.53 kg/m²       Intake/Output Summary (Last 24 hours) at 11/28/2022 1658  Last data filed at 11/28/2022 1521  Gross per 24 hour   Intake 4264.1 ml   Output 7940 ml   Net -3675.9 ml        PHYSICAL EXAM:  General: critically ill. No distress. Skin:  Extremities and face reveal no rashes. No hussein erythema. Median sternotomy incision c/d/I healing. Drains in place. HEENT: Sclerae anicteric. ETT and NGT in place. No abnormal pigmentation of the lips. Cardiovascular: Regular rate and rhythm. No appreciable murmur. Respiratory:  ventilatory breath sounds clear. Symmetric chest rise  GI:  Obese. Soft. Nondistended. Unable to appreciate HSM due to obesity. Normal active bowel sounds. Rectal:  Deferred  Musculoskeletal:  No pitting edema of the lower legs. Normal bulk. Neurological:  heavily sedated. Psychiatric:  critically ill intubated unable to assess. Lymphatic:  No cervical or supraclavicular adenopathy. Lab Data   Lab Results   Component Value Date/Time    WBC 16.5 (H) 11/28/2022 06:00 AM    HGB 9.2 (L) 11/28/2022 06:00 AM    HCT 28.0 (L) 11/28/2022 06:00 AM    PLATELET 882 53/37/7952 06:00 AM    MCV 83.3 11/28/2022 06:00 AM     Lab Results   Component Value Date/Time    INR 1.3 (H) 11/28/2022 06:00 AM    INR 1.3 (H) 11/27/2022 03:34 PM    INR 1.1 11/22/2022 03:13 PM    Prothrombin time 13.6 (H) 11/28/2022 06:00 AM    Prothrombin time 13.3 (H) 11/27/2022 03:34 PM    Prothrombin time 11.2 (H) 11/22/2022 03:13 PM     Lab Results   Component Value Date/Time    Sodium 138 11/28/2022 06:00 AM    Potassium 4.2 11/28/2022 06:00 AM    Chloride 103 11/28/2022 06:00 AM    CO2 27 11/28/2022 06:00 AM    Anion gap 8 11/28/2022 06:00 AM    Glucose 119 (H) 11/28/2022 06:00 AM    BUN 43 (H) 11/28/2022 06:00 AM    Creatinine 1.29 11/28/2022 06:00 AM    BUN/Creatinine ratio 33 (H) 11/28/2022 06:00 AM    GFR est AA >60 09/14/2022 09:54 AM    GFR est non-AA >60 09/14/2022 09:54 AM    Calcium 8.1 (L) 11/28/2022 06:00 AM    Bilirubin, total 1.3 (H) 11/28/2022 06:00 AM    Alk. phosphatase 461 (H) 11/28/2022 06:00 AM    Protein, total 6.2 (L) 11/28/2022 06:00 AM    Albumin 2.4 (L) 11/28/2022 06:00 AM    Globulin 3.8 11/28/2022 06:00 AM    A-G Ratio 0.6 (L) 11/28/2022 06:00 AM    ALT (SGPT) 1,110 (H) 11/28/2022 06:00 AM    AST (SGOT) 1,010 (H) 11/28/2022 06:00 AM     CT Results (most recent):  Results from Hospital Encounter encounter on 11/15/22    CT ABD PELV W WO CONT    Narrative  EXAM: CTA CHEST W OR W WO CONT, CT ABD PELV W WO CONT    INDICATION: r/o acute PE,; triple phase liver, assess spleen for possible  infarct    COMPARISON: Chest CT from 11/16/2022: CT abdomen pelvis from 4/6/2022    CONTRAST: 100 mL of Isovue-370.     TECHNIQUE:  Helical thin section chest CT following uneventful intravenous administration of  nonionic contrast according to departmental PE protocol. Coronal and sagittal  reformats were performed. 3D/MIP post processing was performed. CT dose  reduction was achieved through use of a standardized protocol tailored for this  examination and automatic exposure control for dose modulation. Following the uneventful intravenous administration of contrast, thin axial  images were obtained through the abdomen and pelvis. Coronal and sagittal  reconstructions were generated. Oral contrast was not administered. CT dose  reduction was achieved through use of a standardized protocol tailored for this  examination and automatic exposure control for dose modulation. FINDINGS:    This is a good quality study for the evaluation of pulmonary embolism to the  first subsegmental arterial level. There is no pulmonary embolism to this level. CHEST WALL: No axillary or supraclavicular lymphadenopathy. Median sternotomy  changes are recent. Subxiphoid approach epicardial leads in position. Right  internal jugular approach pulmonary arterial catheter terminates at the main  pulmonary bifurcation. Subxiphoid approach left chest tube terminates in the  left apex. THYROID: No nodule. MEDIASTINUM: Anterior mediastinal stranding is likely postsurgical.  GEOFF: No mass or lymphadenopathy. A few scattered reactive appearing nodes. THORACIC AORTA: No aneurysm. HEART: Enlarged. Aortic valve replacement. ESOPHAGUS: No wall thickening or dilatation. TRACHEA/BRONCHI: Patent. PLEURA: Bilateral small pleural effusions. No pneumothorax. LUNGS: No nodule or mass. Basilar dependent atelectasis. LIVER: No mass. Hepatic steatosis. BILIARY TREE: Cholecystectomy. . CBD is not dilated. SPLEEN: No masses or hypodensities. Normal enhancement characteristics. PANCREAS: No mass or ductal dilatation. ADRENALS: Unremarkable. KIDNEYS: No mass, calculus, or hydronephrosis.   STOMACH: Unremarkable. SMALL BOWEL: No dilatation or wall thickening. COLON: No dilatation or wall thickening. APPENDIX: Unremarkable. PERITONEUM: Trace simple pelvic ascites. No pneumoperitoneum. RETROPERITONEUM: No lymphadenopathy or aortic aneurysm. REPRODUCTIVE ORGANS: Prostate and seminal vesicles are unremarkable. URINARY BLADDER: Strong catheter decompression. BONES: No destructive bone lesion. ABDOMINAL WALL: No mass or hernia. ADDITIONAL COMMENTS: N/A    Impression  1. Poststernotomy changes in the mediastinum. Aortic valve replacement. 2.  Bilateral small pleural effusions and dependent atelectasis. 3.  No evidence of splenic infarction or enhancing hepatic masses. 4.  Hepatic steatosis. 5.  Other findings as above. FINAL PATHOLOGIC DIAGNOSIS* * *     Peripheral blood smear, pathologist review:        Leukocytosis with myeloid lineage predominance and marked left shift   including immature forms. Microcytic hypochromic erythrocytes. Platelets within normal limits. * * *Comment* * *     Evaluation of the peripheral blood smear is notable for leukocytosis with   myeloid lineage predominance and marked left shift, including immature   forms. Rare nucleated red blood cells are noted. These findings are most   compatible with a leukoerythroblastic reaction. Clinical correlation is   recommended.      Medications Reviewed    PMH/SH reviewed - no change compared to H&P  Attending Physician: Nora Baldwin MD   Date/Time:  11/28/2022    ________________________________________________________________________

## 2022-11-28 NOTE — PROGRESS NOTES
Comprehensive Nutrition Assessment    Type and Reason for Visit: Reassess    Nutrition Recommendations/Plan:   Consider initiation of nutrition support in next 24-48h (TF vs TPN depending on gut function), RD to standby and provide recommendations pending clinical course      Malnutrition Assessment:  Malnutrition Status:  No malnutrition (11/23/22 0359)        Nutrition Assessment:  Chart reviewed, case discussed during CCU rounds. Pt reintubated, needs re-estimated. He is sedated with propofol @ 18.7mL/h, which provides 494 kcals daily. NGT to suction, pt was briefly on trophics days ago but now with likely ileus. Surgery consulted. Epi at 2.5. LFT's elevated, GI following for transaminitis. No BM in several days. Monitoring need for nutrition support (TF vs TPN). Nutrition Related Findings:    Meds: eraxis, cefepime, humalog, reglan, flagyl, vancomycin, protonix, miralax, pericolace; Drips: epi, fentanyl, lasix, precedex, insulin, propofol. Edema: +2-all extremities. BM 11/15 Wound Type: Surgical incision    Current Nutrition Intake & Therapies:  Average Meal Intake: NPO         Anthropometric Measures:  Height: 5' 9\" (175.3 cm)  Ideal Body Weight (IBW): 160 lbs (73 kg)     Current Body Wt:  124.5 kg (274 lb 7.6 oz), 171.5 % IBW.  Bed scale  Current BMI (kg/m2): 40.5                          BMI Category: Obese class 3 (BMI 40.0 or greater)    Estimated Daily Nutrient Needs:  Energy Requirements Based On: Formula  Weight Used for Energy Requirements: Current  Energy (kcal/day): PSU 8326 (MSJ 2130)  Weight Used for Protein Requirements: Ideal  Protein (g/day): 109g (1.5gPro/kg IBW)  Method Used for Fluid Requirements: 1 ml/kcal  Fluid (ml/day): 2000mL    Nutrition Diagnosis:   Inadequate protein-energy intake related to impaired respiratory function, altered GI function as evidenced by NPO or clear liquid status due to medical condition    Nutrition Interventions:   Food and/or Nutrient Delivery: Start parenteral nutrition, Start tube feeding  Nutrition Education/Counseling: No recommendations at this time  Coordination of Nutrition Care: Continue to monitor while inpatient, Interdisciplinary rounds       Goals:     Goals: Initiate nutrition support, by next RD assessment       Nutrition Monitoring and Evaluation:   Behavioral-Environmental Outcomes: None identified  Food/Nutrient Intake Outcomes: IVF intake  Physical Signs/Symptoms Outcomes: Biochemical data, Nutrition focused physical findings, Skin, Weight, Nausea/vomiting    Discharge Planning:     Too soon to determine    Darron Powell RD, CNSC  Contact: ext [phone]

## 2022-11-28 NOTE — PROGRESS NOTES
0700 - Received report from BOD[facility], [facility].    0800 - shift assessment completed. See chart. 0900 - rounds completed     1045 - patient taken down to CT.    1200 - patient arrived back to the unit. Reassessment completed no changes noted. 1600 - reassessment completed no changes noted. 1730 - cultures drawn on patient, he is notably hypothermic at 95.9 F. Warmed blankets applied.     1900 - report given to KG Zuniga

## 2022-11-28 NOTE — PROGRESS NOTES
Pharmacy Automatic Renal Dosing Protocol - Antimicrobials    Indication for Antimicrobials: sepsis     Current Regimen of Each Antimicrobial:   Cefepime 2g IV q8h ; started ; day 4  Vancomycin - pharmacy to dose; started ; day 4  Metronidazole 500 mg IV q12h; started ; day 4    Previous Antimicrobial Therapy:   Periop cefazolin and vanc     Goal Level: VANCOMYCIN TROUGH GOAL RANGE  Vancomycin Trough: 15 - 20 mcg/mL  (AUC: 400 - 600 mg/hr/Liter/day)     Date Dose & Interval Measured (mcg/mL) Extrapolated (mcg/mL)    S/p load 8 -    1000 mg q12 9.9 430           Significant Cultures:    blood cx NGTD pending   Sputum cx (ETT aspirate) NGTD, pending    blood cx NGTD pending     Labs:  Recent Labs     22  0600 22  1652 22  1534 22  0425   CREA 1.29  --  1.47* 1.15   BUN 43*  --  49* 45*   WBC 16.5* 13.9*  --  13.1*     Temp (24hrs), Av.3 °F (37.4 °C), Min:97.5 °F (36.4 °C), Max:102.7 °F (39.3 °C)    Creatinine Clearance (mL/min) or Dialysis: 73.8 mL/min (IBW)    Impression/Plan:   Scr improved some from yesterday   Continue vancomycin 1250 mg IV q12h for a predicted AUC of 533. Will check a level  0400. Continue cefepime and metronidazole  Antimicrobial stop date pending     Pharmacy will follow daily and adjust medications as appropriate for renal function and/or serum levels.     Thank you,  Ellis Morgan, PHARMD

## 2022-11-28 NOTE — PROGRESS NOTES
Physical Therapy  Chart reviewed and spoke with nursing. Patient remains intubated, sedated and critically ill. He is not appropriate for therapy services at this time. Will sign off. Please re-consult once/if patient's medical condition improves and patient is able to participate in therapy.   Thank you,  Sherryle Look, PT

## 2022-11-28 NOTE — PROGRESS NOTES
Care Management:    Patient remains in the ICU vented and on drips. He is POD # 5 CABG. He lives with his mom Konrad Almonte at baseline and has a significant other Konrad Almonte. Chart reviewed and care management will cont to follow for discharge needs. Anticipated HH versus acute rehab.      Meera Carlos RN [facility] [phone]

## 2022-11-28 NOTE — CONSULTS
Cross Cover  Infectious Disease Consult Note    Reason for Consult: sepsis (consult request received imagingconsult request received today )  Date of Consultation: November 28, 2022  Date of Admission: 11/15/2022  Referring Physician: Palak Reason       HPI: History obtained from chart review  Mr Maninder Armenta is a 36year old gentleman with history of severe aortic stenosis and coronary artery disease, aortic aneurysm who is status post CABG with internal left mammary artery to LAD and aortic valve replacement with a tissue valve, repair of ascending aortic aneurysm with a graft on 11/22/2022. From chart review also has a history of having had COVID, diabetes, hypertension and hypothyroidism. He has had elevated LFTs but this acutely worsened on 11/27/2022. From reviewing fever curves he has had a T-max of 101.3 on 11/28/2022. Elevated temperatures noted since 11/23/2022 . He has had several lines inserted including a right central venous catheter inserted 11/22/2022, arterial line 11/22/2022, Boris drain 11/22/2022. He was extubated 11/22/2022 on postop day 0 at 1900 per ICU chart notes . CT surgery notes from 11/23/2022 indicate patient had fever. Worsening SERGE despite fluid boluses and transaminitis with signs of fluid overload. Nephrology consulted on 11/23/2022 with notes indicating SERGE likely secondary to ATN from hypotension. On 11/24/2022 patient had hypoxia with SPO2 of 82% on BiPAP FiO2 100% and respiratory rate in the 30s per critical care nurse practitioner notes. Decision was made to intubate on 11/24/2022. Therapeutic bronchoscopy was performed on 11/24/2022 with notes indicating normal trachea whitney and right-sided endobronchial anatomy found to be normal with minimal secretions and left-sided endobronchial anatomy found to be normal with minimal secretions.     A rapid COVID test was positive on 11/24/2022 with a negative respiratory virus panel PCR with a negative respiratory virus panel PCRwith a negative respiratory virus panel PCR on same day. Per review of critical care notes it was felt that he did not have active COVID but had sequela from recent exposure and steroids and additional treatments were held at that time    A SYLWIA done on 11/24/2022 read as no signs of tamponade, no clot and RV no evidence of PE, no evidence of shunt or PFO. SYLWIA report read as well-seated aortic valve leaflets functioning normally. Small collection in the pericardium about 1 cm thick around the RV but no signs of tamponade. He continues to have fevers and was eventually started on antibiotics including vancomycin cefepime Flagyl. Anidulafungin was added later 11/28/22    Blood culture, sputum culture have not been revealing so far     CT C/A/P with poststernotomy changes in the mediastinum, bilateral small pleural effusions and atelectasis.  No evidence of hepatic masses/splenic infarction           past Medical History:  Past Medical History:   Diagnosis Date    Anxiety and depression     anxiety, depression    Bleeding of eye, left     8/17/21 pt reports receiving injections in right eye for beeding    Chronic headaches 2007    COVID-19 12/20/2020    Diabetes type 1, uncontrolled     since 8years old    GERD (gastroesophageal reflux disease)     Hypercholesterolemia     Hypertension     Hypothyroidism     MI (myocardial infarction) ([facility] [address]     as of 8/17/21 pt reports 9 stents total    WALLY on CPAP          Surgical History:  Past Surgical History:   Procedure Laterality Date    HX CARPAL TUNNEL RELEASE Left 2012    HX CARPAL TUNNEL RELEASE Right 2018    CTE trigger thumb and index finger    HX HEART CATHETERIZATION      HX HEENT      HX LAP CHOLECYSTECTOMY  8/26/14    Dr Aimee Renner    HX WISDOM TEETH EXTRACTION           Family History:   Family History   Problem Relation Age of Onset    Diabetes Mother     Hypertension Mother     Heart Disease Father     Cancer Father     Diabetes Father     Other Father         MAC    Diabetes Paternal Aunt     Diabetes Maternal Grandmother     Thyroid Cancer Maternal Grandmother     Kidney Disease Maternal Grandmother     Arthritis Maternal Grandmother     Heart Disease Maternal Grandfather     High Cholesterol Maternal Grandfather     Hypertension Maternal Grandfather     Diabetes Paternal Grandmother     Hemophilia Paternal Grandfather          Social History:     Unable to obtain from patient     Allergies: Allergies   Allergen Reactions    Gabapentin Swelling     Of feet    Morphine Nausea and Vomiting    Penicillin G Swelling    Percocet [Oxycodone-Acetaminophen] Hives and Nausea and Vomiting     Pt is allergic to Oxycodone, has previously tolerated Tylenol and Hydrocodone. Sulfa (Sulfonamide Antibiotics) Swelling    Tramadol Nausea Only     Nausea and headache           Review of Systems:     Unable to obtain from patient     Medications:  No current facility-administered medications on file prior to encounter. Current Outpatient Medications on File Prior to Encounter   Medication Sig Dispense Refill    metFORMIN (GLUCOPHAGE) 500 mg tablet TAKE 1 TABLET BY MOUTH TWICE DAILY WITH MEALS 180 Tablet 3    insulin glargine U-300 conc (Toujeo Max U-300 SoloStar) 300 unit/mL (3 mL) inpn Take 140 units every day (new dosage) 30 mL 5    LORazepam (ATIVAN) 1 mg tablet Take 1 Tablet by mouth every eight (8) hours as needed for Anxiety. Max Daily Amount: 3 mg. 90 Tablet 2    sertraline (ZOLOFT) 100 mg tablet Take 1 Tablet by mouth daily. 30 Tablet 2    sertraline (ZOLOFT) 50 mg tablet Take 1 Tablet by mouth daily. 30 Tablet 2    alum-mag hydroxide-simeth (MYLANTA) 200-200-20 mg/5 mL susp Take 30 mL by mouth every four (4) hours as needed for Indigestion. 150 mL 0    levothyroxine (SYNTHROID) 125 mcg tablet Take 1 Tablet by mouth Daily (before breakfast). 90 Tablet 3    insulin regular (NOVOLIN R, HUMULIN R) 100 unit/mL injection 40 units with each meal, plus correction. Max daily dose of 150 units. 40 mL 5    busPIRone (BUSPAR) 15 mg tablet Take 1 Tablet by mouth three (3) times daily. 90 Tablet 2    Insulin Needles, Disposable, 32 gauge x 5/32\" ndle 5 shots per day (dispense whatever brand is covered by his insurance) 500 Pen Needle 3    lisinopriL (PRINIVIL, ZESTRIL) 10 mg tablet Take 10 mg by mouth daily. testosterone (ANDROGEL) 20.25 mg/1.25 gram (1.62 %) gel Apply 4 pump presses daily. Ok to dispense generic testosterone. 2 Each 5    butalbital-acetaminophen-caffeine (FIORICET, ESGIC) -40 mg per tablet Take 1 Tablet by mouth every six (6) hours as needed for Headache. Indications: a migraine headache 10 Tablet 0    esomeprazole (NEXIUM) 40 mg capsule TAKE 1 CAPSULE BY MOUTH ONCE DAILY BEFORE BREAKFAST DO NOT OPEN CAPSULE      metoprolol succinate (TOPROL-XL) 25 mg XL tablet Take 25 mg by mouth two (2) times a day. rosuvastatin (CRESTOR) 40 mg tablet Take 40 mg by mouth daily. famotidine (PEPCID) 40 mg tablet Take 1 Tablet by mouth daily. 30 Tablet 3    clopidogreL (PLAVIX) 75 mg tab Take 1 Tablet by mouth daily. 30 Tablet 12    cholecalciferol (Vitamin D3) (5000 Units/125 mcg) tab tablet Take 1 Tab by mouth daily. 90 Tab 1    Insulin Syringe-Needle U-100 (BD Insulin Syringe) 1 mL 25 x 1\" syrg Use for drawing up/injecting testosterone once weekly. 100 Each 3    nitroglycerin (NITROSTAT) 0.4 mg SL tablet Take 1 Tab by mouth every five (5) minutes as needed for Chest Pain. Sit down then put one tab under the tongue every 5 minutes as needed 1 Bottle 0    aspirin delayed-release 81 mg tablet Take 1 Tab by mouth daily. 30 Tab 0    insulin U-500 syringe-needle (BD Insulin Syringe U-500) 1/2 mL 31 gauge x 15/64\" syrg Three shots per day.  300 Syringe 3           Physical Exam:    Vitals: Patient Vitals for the past 24 hrs:   Temp Pulse Resp BP SpO2   11/28/22 1536 -- 71 20 -- 96 %   11/28/22 1522 98.1 °F (36.7 °C) 72 20 (!) 153/69 96 %   11/28/22 1418 98.8 °F (37.1 °C) 77 -- 131/60 95 %   11/28/22 1301 99.3 °F (37.4 °C) 81 20 133/61 94 %   11/28/22 1227 99.7 °F (37.6 °C) 84 20 (!) 107/47 94 %   11/28/22 1217 -- 85 20 -- 95 %   11/28/22 0942 (!) 101.3 °F (38.5 °C) 82 20 (!) 131/57 93 %   11/28/22 0832 -- 81 20 -- 94 %   11/28/22 0825 (!) 100.6 °F (38.1 °C) 80 11 (!) 156/60 96 %   11/28/22 0737 100 °F (37.8 °C) 77 20 -- 94 %   11/28/22 0722 99.9 °F (37.7 °C) 76 20 -- 95 %   11/28/22 0707 99.9 °F (37.7 °C) 75 20 -- 95 %   11/28/22 0700 99.7 °F (37.6 °C) 77 20 -- 96 %   11/28/22 0610 99.3 °F (37.4 °C) 72 18 -- 94 %   11/28/22 0555 99.1 °F (37.3 °C) 72 18 (!) 112/41 95 %   11/28/22 0552 99.1 °F (37.3 °C) 72 18 -- 95 %   11/28/22 0540 99.1 °F (37.3 °C) 72 18 -- 94 %   11/28/22 0537 99.1 °F (37.3 °C) 73 20 (!) 94/48 94 %   11/28/22 0522 99.1 °F (37.3 °C) 72 18 (!) 121/53 96 %   11/28/22 0507 99.1 °F (37.3 °C) 73 10 -- --   11/28/22 0452 99 °F (37.2 °C) 73 20 -- 97 %   11/28/22 0437 98.8 °F (37.1 °C) 76 20 -- 97 %   11/28/22 0422 98.6 °F (37 °C) 72 20 -- 97 %   11/28/22 0407 98.6 °F (37 °C) 72 20 -- 97 %   11/28/22 0341 -- 70 20 -- 97 %   11/28/22 0337 98.4 °F (36.9 °C) 70 20 -- 97 %   11/28/22 0322 98.2 °F (36.8 °C) 70 20 -- 97 %   11/28/22 0307 98.2 °F (36.8 °C) 70 20 -- 97 %   11/28/22 0252 98.1 °F (36.7 °C) 68 20 -- 98 %   11/28/22 0237 98.1 °F (36.7 °C) 68 20 -- 98 %   11/28/22 0222 97.9 °F (36.6 °C) 68 20 -- 98 %   11/28/22 0207 97.9 °F (36.6 °C) 69 20 -- 99 %   11/28/22 0152 97.7 °F (36.5 °C) 68 20 -- 99 %   11/28/22 0137 97.7 °F (36.5 °C) 68 18 -- 99 %   11/28/22 0122 97.7 °F (36.5 °C) 66 (!) 0 -- 98 %   11/28/22 0107 97.7 °F (36.5 °C) 67 16 -- 98 %   11/28/22 0052 97.7 °F (36.5 °C) 67 16 122/71 98 %   11/28/22 0037 97.7 °F (36.5 °C) 67 16 -- 98 %   11/28/22 0032 -- 68 20 -- 98 %   11/28/22 0022 97.7 °F (36.5 °C) 68 11 -- 95 %   11/28/22 0007 97.7 °F (36.5 °C) 67 20 -- 97 %   11/27/22 2352 97.7 °F (36.5 °C) 67 20 128/62 97 %   11/27/22 2322 97.7 °F (36.5 °C) 66 20 -- 96 %   11/27/22 2307 97.9 °F (36.6 °C) 65 20 -- 97 %   11/27/22 2252 97.9 °F (36.6 °C) 66 20 136/66 96 %   11/27/22 2237 98.1 °F (36.7 °C) 67 20 -- 96 %   11/27/22 2222 98.4 °F (36.9 °C) 69 20 -- 96 %   11/27/22 2207 98.8 °F (37.1 °C) 69 20 -- 95 %   11/27/22 2152 99 °F (37.2 °C) 70 20 (!) 93/54 95 %   11/27/22 2137 99.3 °F (37.4 °C) 72 20 129/71 94 %   11/27/22 2122 99.5 °F (37.5 °C) 77 20 -- 94 %   11/27/22 2107 99.7 °F (37.6 °C) 75 20 136/71 94 %   11/27/22 2105 -- 78 20 -- 94 %   11/27/22 2052 100 °F (37.8 °C) 78 20 -- 94 %   11/27/22 2037 100.4 °F (38 °C) 79 20 -- 93 %   11/27/22 2022 (!) 100.8 °F (38.2 °C) 82 20 -- 93 %   11/27/22 2007 (!) 100.9 °F (38.3 °C) 83 20 -- 94 %   11/27/22 1952 (!) 101.3 °F (38.5 °C) 83 18 -- 95 %   11/27/22 1937 (!) 101.7 °F (38.7 °C) 82 19 -- 94 %   11/27/22 1922 (!) 101.8 °F (38.8 °C) 84 21 -- 94 %   11/27/22 1907 (!) 102 °F (38.9 °C) 90 20 -- 94 %   11/27/22 1900 (!) 102.2 °F (39 °C) 85 20 -- 93 %   11/27/22 1800 (!) 102.7 °F (39.3 °C) 86 22 -- 93 %   11/27/22 1700 (!) 102.7 °F (39.3 °C) 90 16 -- 90 %   11/27/22 1645 -- 88 20 -- 94 %     GEN: intubated   HEENT: intubated, NG, CVC  CV: S1, S2 heard regular on monitor  , chest incision without surrounding erythema, no purulent discharge , drain   Lungs: on mechanical ventilation   Abdomen: soft,   Genitourinary: pennington  Extremities:edema   Neuro: unable to assess   Skin: no rash on face, extremities, chest abdomen  Lines: cvc pennington, arterial line, NG,ET lloyd drain       Labs:   Recent Results (from the past 24 hour(s))   PTT    Collection Time: 11/27/22  4:52 PM   Result Value Ref Range    aPTT 28.9 22.1 - 31.0 sec    aPTT, therapeutic range     58.0 - 77.0 SECS   UNFRACTIONATED AND LMW HEPARIN    Collection Time: 11/27/22  4:52 PM   Result Value Ref Range    Heparin Xa,Unfrac. and LMW <0.10 IU/mL   CBC WITH AUTOMATED DIFF    Collection Time: 11/27/22  4:52 PM   Result Value Ref Range    WBC 13.9 (H) 4.1 - 11.1 K/uL    RBC 2.88 (L) 4.10 - 5.70 M/uL    HGB 7.9 (L) 12.1 - 17.0 g/dL    HCT 24.6 (L) 36.6 - 50.3 %    MCV 85.4 80.0 - 99.0 FL    MCH 27.4 26.0 - 34.0 PG    MCHC 32.1 30.0 - 36.5 g/dL    RDW 14.7 (H) 11.5 - 14.5 %    PLATELET 621 042 - 903 K/uL    MPV 9.9 8.9 - 12.9 FL    NRBC 0.9 (H) 0  WBC    ABSOLUTE NRBC 0.12 (H) 0.00 - 0.01 K/uL    NEUTROPHILS 80 (H) 32 - 75 %    LYMPHOCYTES 15 12 - 49 %    MONOCYTES 1 (L) 5 - 13 %    EOSINOPHILS 3 0 - 7 %    BASOPHILS 0 0 - 1 %    METAMYELOCYTES 1 %    IMMATURE GRANULOCYTES 0 0.0 - 0.5 %    ABS. NEUTROPHILS 11.1 (H) 1.8 - 8.0 K/UL    ABS. LYMPHOCYTES 2.1 0.8 - 3.5 K/UL    ABS. MONOCYTES 0.1 0.0 - 1.0 K/UL    ABS. EOSINOPHILS 0.4 0.0 - 0.4 K/UL    ABS. BASOPHILS 0.0 0.0 - 0.1 K/UL    ABS. IMM.  GRANS. 0.0 0.00 - 0.04 K/UL    DF MANUAL      RBC COMMENTS NORMOCYTIC, NORMOCHROMIC     CORTISOL    Collection Time: 11/27/22  5:51 PM   Result Value Ref Range    Cortisol, random 23.3 ug/dL   CULTURE, BLOOD, PAIRED    Collection Time: 11/27/22  5:51 PM    Specimen: Blood   Result Value Ref Range    Special Requests: NO SPECIAL REQUESTS      Culture result: NO GROWTH AFTER 14 HOURS     GLUCOSE, POC    Collection Time: 11/27/22  6:04 PM   Result Value Ref Range    Glucose (POC) 76 65 - 117 mg/dL    Performed by Marlan Olszewski RN    GLUCOSTABILIZER    Collection Time: 11/27/22  6:04 PM   Result Value Ref Range    Glucose 76 mg/dL    Insulin order 1.3 units/hour    Insulin adminstered 1.3 units/hour    Multiplier 0.079     Low target 95 mg/dL    High target 130 mg/dL    D50 order 0.0 ml    D50 administered 0.00 ml    Minutes until next BG 60 min    Order initials MG     Administered initials MG    GLUCOSE, POC    Collection Time: 11/27/22  7:22 PM   Result Value Ref Range    Glucose (POC) 110 65 - 117 mg/dL    Performed by Matty Henriquez    Collection Time: 11/27/22  7:23 PM   Result Value Ref Range    Glucose 110 mg/dL    Insulin order 4.0 units/hour    Insulin adminstered 4.0 units/hour    Multiplier 0.079     Low target 95 mg/dL    High target 130 mg/dL    D50 order 0.0 ml    D50 administered 0.00 ml    Minutes until next BG 60 min    Order initials MG     Administered initials MG    GLUCOSE, POC    Collection Time: 11/27/22  8:29 PM   Result Value Ref Range    Glucose (POC) 113 65 - 117 mg/dL    Performed by [employer]    Collection Time: 11/27/22  8:30 PM   Result Value Ref Range    Glucose 113 mg/dL    Insulin order 4.2 units/hour    Insulin adminstered 4.2 units/hour    Multiplier 0.079     Low target 95 mg/dL    High target 130 mg/dL    D50 order 0.0 ml    D50 administered 0.00 ml    Minutes until next BG 60 min    Order initials mw     Administered initials mw    GLUCOSE, POC    Collection Time: 11/27/22  9:36 PM   Result Value Ref Range    Glucose (POC) 139 (H) 65 - 117 mg/dL    Performed by [employer]    Collection Time: 11/27/22  9:36 PM   Result Value Ref Range    Glucose 139 mg/dL    Insulin order 7.0 units/hour    Insulin adminstered 7.0 units/hour    Multiplier 0.089     Low target 95 mg/dL    High target 130 mg/dL    D50 order 0.0 ml    D50 administered 0.00 ml    Minutes until next BG 60 min    Order initials mw     Administered initials mw    UNFRACTIONATED AND LMW HEPARIN    Collection Time: 11/27/22  9:38 PM   Result Value Ref Range    Heparin Xa,Unfrac. and LMW 0.36 IU/mL   D DIMER    Collection Time: 11/27/22  9:38 PM   Result Value Ref Range    D-dimer 6.78 (H) 0.00 - 0.65 mg/L FEU   GLUCOSE, POC    Collection Time: 11/27/22 10:40 PM   Result Value Ref Range    Glucose (POC) 128 (H) 65 - 117 mg/dL    Performed by [employer]    Collection Time: 11/27/22 10:41 PM   Result Value Ref Range    Glucose 128 mg/dL    Insulin order 6.1 units/hour    Insulin adminstered 6.1 units/hour    Multiplier 0.089     Low target 95 mg/dL    High target 130 mg/dL    D50 order 0.0 ml    D50 administered 0.00 ml    Minutes until next BG 60 min    Order initials mw     Administered initials mw    GLUCOSE, POC    Collection Time: 11/27/22 11:49 PM   Result Value Ref Range    Glucose (POC) 127 (H) 65 - 117 mg/dL    Performed by [employer]    Collection Time: 11/27/22 11:49 PM   Result Value Ref Range    Glucose 127 mg/dL    Insulin order 6.0 units/hour    Insulin adminstered 6.0 units/hour    Multiplier 0.089     Low target 95 mg/dL    High target 130 mg/dL    D50 order 0.0 ml    D50 administered 0.00 ml    Minutes until next BG 60 min    Order initials mw     Administered initials mw    UNFRACTIONATED AND LMW HEPARIN    Collection Time: 11/28/22 12:33 AM   Result Value Ref Range    Heparin Xa,Unfrac. and LMW 0.41 IU/mL   GLUCOSE, POC    Collection Time: 11/28/22 12:54 AM   Result Value Ref Range    Glucose (POC) 134 (H) 65 - 117 mg/dL    Performed by [employer]    Collection Time: 11/28/22 12:59 AM   Result Value Ref Range    Glucose 134 mg/dL    Insulin order 7.3 units/hour    Insulin adminstered 7.3 units/hour    Multiplier 0.099     Low target 95 mg/dL    High target 130 mg/dL    D50 order 0.0 ml    D50 administered 0.00 ml    Minutes until next BG 60 min    Order initials mw     Administered initials mw    GLUCOSE, POC    Collection Time: 11/28/22  2:01 AM   Result Value Ref Range    Glucose (POC) 126 (H) 65 - 117 mg/dL    Performed by [employer]    Collection Time: 11/28/22  2:01 AM   Result Value Ref Range    Glucose 126 mg/dL    Insulin order 6.5 units/hour    Insulin adminstered 6.5 units/hour    Multiplier 0.099     Low target 95 mg/dL    High target 130 mg/dL    D50 order 0.0 ml    D50 administered 0.00 ml    Minutes until next BG 60 min    Order initials mw     Administered initials mw    GLUCOSE, POC    Collection Time: 11/28/22  3:05 AM   Result Value Ref Range    Glucose (POC) 123 (H) 65 - 117 mg/dL    Performed by [employer] J[facility] Mort    Collection Time: 11/28/22  3:06 AM   Result Value Ref Range    Glucose 123 mg/dL    Insulin order 6.2 units/hour    Insulin adminstered 6.2 units/hour    Multiplier 0.099     Low target 95 mg/dL    High target 130 mg/dL    D50 order 0.0 ml    D50 administered 0.00 ml    Minutes until next BG 60 min    Order initials mw     Administered initials mw    GLUCOSE, POC    Collection Time: 11/28/22  4:09 AM   Result Value Ref Range    Glucose (POC) 114 65 - 117 mg/dL    Performed by Jessica Meneses    Collection Time: 11/28/22  4:09 AM   Result Value Ref Range    Glucose 114 mg/dL    Insulin order 5.3 units/hour    Insulin adminstered 5.3 units/hour    Multiplier 0.099     Low target 95 mg/dL    High target 130 mg/dL    D50 order 0.0 ml    D50 administered 0.00 ml    Minutes until next BG 60 min    Order initials mw     Administered initials mw    GLUCOSE, POC    Collection Time: 11/28/22  5:14 AM   Result Value Ref Range    Glucose (POC) 122 (H) 65 - 117 mg/dL    Performed by Arlyn Canales PCT    GLUCOSTABILIZER    Collection Time: 11/28/22  5:16 AM   Result Value Ref Range    Glucose 122 mg/dL    Insulin order 6.1 units/hour    Insulin adminstered 6.1 units/hour    Multiplier 0.099     Low target 95 mg/dL    High target 130 mg/dL    D50 order 0.0 ml    D50 administered 0.00 ml    Minutes until next BG 60 min    Order initials mw     Administered initials mw    PROCALCITONIN    Collection Time: 11/28/22  6:00 AM   Result Value Ref Range    Procalcitonin 4.80 ng/mL   METABOLIC PANEL, COMPREHENSIVE    Collection Time: 11/28/22  6:00 AM   Result Value Ref Range    Sodium 138 136 - 145 mmol/L    Potassium 4.2 3.5 - 5.1 mmol/L    Chloride 103 97 - 108 mmol/L    CO2 27 21 - 32 mmol/L    Anion gap 8 5 - 15 mmol/L    Glucose 119 (H) 65 - 100 mg/dL    BUN 43 (H) 6 - 20 MG/DL    Creatinine 1.29 0.70 - 1.30 MG/DL    BUN/Creatinine ratio 33 (H) 12 - 20      eGFR >60 >60 ml/min/1.73m2    Calcium 8.1 (L) 8.5 - 10.1 MG/DL    Bilirubin, total 1.3 (H) 0.2 - 1.0 MG/DL    ALT (SGPT) 1,110 (H) 12 - 78 U/L    AST (SGOT) 1,010 (H) 15 - 37 U/L    Alk. phosphatase 461 (H) 45 - 117 U/L    Protein, total 6.2 (L) 6.4 - 8.2 g/dL    Albumin 2.4 (L) 3.5 - 5.0 g/dL    Globulin 3.8 2.0 - 4.0 g/dL    A-G Ratio 0.6 (L) 1.1 - 2.2     CBC WITH AUTOMATED DIFF    Collection Time: 11/28/22  6:00 AM   Result Value Ref Range    WBC 16.5 (H) 4.1 - 11.1 K/uL    RBC 3.36 (L) 4.10 - 5.70 M/uL    HGB 9.2 (L) 12.1 - 17.0 g/dL    HCT 28.0 (L) 36.6 - 50.3 %    MCV 83.3 80.0 - 99.0 FL    MCH 27.4 26.0 - 34.0 PG    MCHC 32.9 30.0 - 36.5 g/dL    RDW 15.6 (H) 11.5 - 14.5 %    PLATELET 798 105 - 869 K/uL    MPV 10.1 8.9 - 12.9 FL    NRBC 0.4 (H) 0  WBC    ABSOLUTE NRBC 0.06 (H) 0.00 - 0.01 K/uL    NEUTROPHILS 73 32 - 75 %    LYMPHOCYTES 15 12 - 49 %    MONOCYTES 5 5 - 13 %    EOSINOPHILS 5 0 - 7 %    BASOPHILS 2 (H) 0 - 1 %    IMMATURE GRANULOCYTES 0 0.0 - 0.5 %    ABS. NEUTROPHILS 12.1 (H) 1.8 - 8.0 K/UL    ABS. LYMPHOCYTES 2.5 0.8 - 3.5 K/UL    ABS. MONOCYTES 0.8 0.0 - 1.0 K/UL    ABS. EOSINOPHILS 0.8 (H) 0.0 - 0.4 K/UL    ABS. BASOPHILS 0.3 (H) 0.0 - 0.1 K/UL    ABS. IMM.  GRANS. 0.0 0.00 - 0.04 K/UL    DF MANUAL      RBC COMMENTS ANISOCYTOSIS  1+       UNFRACTIONATED AND LMW HEPARIN    Collection Time: 11/28/22  6:00 AM   Result Value Ref Range    Heparin Xa,Unfrac. and LMW 0.39 IU/mL   BILIRUBIN, DIRECT    Collection Time: 11/28/22  6:00 AM   Result Value Ref Range    Bilirubin, direct 0.6 (H) 0.0 - 0.2 MG/DL   MAGNESIUM    Collection Time: 11/28/22  6:00 AM   Result Value Ref Range    Magnesium 2.3 1.6 - 2.4 mg/dL   PROTHROMBIN TIME + INR    Collection Time: 11/28/22  6:00 AM   Result Value Ref Range    INR 1.3 (H) 0.9 - 1.1      Prothrombin time 13.6 (H) 9.0 - 11.1 sec   GLUCOSE, POC    Collection Time: 11/28/22  6:20 AM   Result Value Ref Range    Glucose (POC) 96 65 - 117 mg/dL    Performed by Severiano Gartner [employer]    Collection Time: 11/28/22  6:20 AM   Result Value Ref Range    Glucose 96 mg/dL    Insulin order 3.6 units/hour    Insulin adminstered 3.6 units/hour    Multiplier 0.099     Low target 95 mg/dL    High target 130 mg/dL    D50 order 0.0 ml    D50 administered 0.00 ml    Minutes until next BG 60 min    Order initials mw     Administered initials mw    GLUCOSE, POC    Collection Time: 11/28/22  7:29 AM   Result Value Ref Range    Glucose (POC) 111 65 - 117 mg/dL    Performed by Ariana Morgan RN    GLUCOSTABILIZER    Collection Time: 11/28/22  7:29 AM   Result Value Ref Range    Glucose 111 mg/dL    Insulin order 5.0 units/hour    Insulin adminstered 5.0 units/hour    Multiplier 0.099     Low target 95 mg/dL    High target 130 mg/dL    D50 order 0.0 ml    D50 administered 0.00 ml    Minutes until next  min    Order initials sp     Administered initials sp    BLOOD GAS, ARTERIAL    Collection Time: 11/28/22  8:48 AM   Result Value Ref Range    pH 7.39 7.35 - 7.45      PCO2 47 (H) 35 - 45 mmHg    PO2 84 80 - 100 mmHg    O2 SAT 96 92 - 97 %    BICARBONATE 27 (H) 22 - 26 mmol/L    BASE EXCESS 1.8 mmol/L    O2 METHOD VENT      FIO2 80 %    MODE ASSIST CONTROL      Tidal volume 450.0      SET RATE 20      PEEP/CPAP 12.0      Sample source ARTERIAL      SITE DRAWN FROM ARTERIAL LINE      MARSHA'S TEST NOT APPLICABLE     GLUCOSE, POC    Collection Time: 11/28/22  9:36 AM   Result Value Ref Range    Glucose (POC) 86 65 - 117 mg/dL    Performed by Ariana Morgan RN    GLUCOSTABILIZER    Collection Time: 11/28/22  9:36 AM   Result Value Ref Range    Glucose 86 mg/dL    Insulin order 2.1 units/hour    Insulin adminstered 2.1 units/hour    Multiplier 0.079     Low target 95 mg/dL    High target 130 mg/dL    D50 order 0.0 ml    D50 administered 0.00 ml    Minutes until next BG 60 min    Order initials MG     Administered initials MG    EKG, 12 LEAD, INITIAL    Collection Time: 11/28/22 10:03 AM Result Value Ref Range    Ventricular Rate 79 BPM    Atrial Rate 79 BPM    P-R Interval 140 ms    QRS Duration 100 ms    Q-T Interval 380 ms    QTC Calculation (Bezet) 435 ms    Calculated P Axis 42 degrees    Calculated R Axis 38 degrees    Calculated T Axis -144 degrees    Diagnosis       Normal sinus rhythm  Poor R-wave Progression  Nonspecific T wave abnormality  When compared with ECG of 25-NOV-2022 13:47,  No significant change  Confirmed by Ryan Lagos ([id number]) on 11/28/2022 4:15:43 PM     GLUCOSE, POC    Collection Time: 11/28/22 10:40 AM   Result Value Ref Range    Glucose (POC) 104 65 - 117 mg/dL    Performed by Ariana Morgan RN    GLUCOSTABILIZER    Collection Time: 11/28/22 10:40 AM   Result Value Ref Range    Glucose 104 mg/dL    Insulin order 3.5 units/hour    Insulin adminstered 3.5 units/hour    Multiplier 0.079     Low target 95 mg/dL    High target 130 mg/dL    D50 order 0.0 ml    D50 administered 0.00 ml    Minutes until next BG 60 min    Order initials sp     Administered initials sp    GLUCOSE, POC    Collection Time: 11/28/22 11:49 AM   Result Value Ref Range    Glucose (POC) 106 65 - 117 mg/dL    Performed by Ariana Morgan RN    GLUCOSTABILIZER    Collection Time: 11/28/22 11:50 AM   Result Value Ref Range    Glucose 106 mg/dL    Insulin order 3.6 units/hour    Insulin adminstered 3.6 units/hour    Multiplier 0.079     Low target 95 mg/dL    High target 130 mg/dL    D50 order 0.0 ml    D50 administered 0.00 ml    Minutes until next BG 60 min    Order initials sp     Administered initials sp    TYPE & SCREEN    Collection Time: 11/28/22 12:58 PM   Result Value Ref Range    Crossmatch Expiration 12/01/2022,2359     ABO/Rh(D) A POSITIVE     Antibody screen NEG    PROCALCITONIN    Collection Time: 11/28/22 12:58 PM   Result Value Ref Range    Procalcitonin 1.77 ng/mL   TSH 3RD GENERATION    Collection Time: 11/28/22 12:58 PM   Result Value Ref Range    TSH 6.70 (H) 0.36 - 3.74 uIU/mL   GLUCOSE, POC    Collection Time: 11/28/22 12:59 PM   Result Value Ref Range    Glucose (POC) 124 (H) 65 - 117 mg/dL    Performed by Enid Garcia RN    GLUCOSTABILIZER    Collection Time: 11/28/22 12:59 PM   Result Value Ref Range    Glucose 124 mg/dL    Insulin order 5.1 units/hour    Insulin adminstered 5.1 units/hour    Multiplier 0.079     Low target 95 mg/dL    High target 130 mg/dL    D50 order 0.0 ml    D50 administered 0.00 ml    Minutes until next BG 60 min    Order initials MG     Administered initials MG    GLUCOSE, POC    Collection Time: 11/28/22  2:04 PM   Result Value Ref Range    Glucose (POC) 113 65 - 117 mg/dL    Performed by Ciarra Joe RN    GLUCOSTABILIZER    Collection Time: 11/28/22  2:05 PM   Result Value Ref Range    Glucose 113 mg/dL    Insulin order 4.2 units/hour    Insulin adminstered 4.2 units/hour    Multiplier 0.079     Low target 95 mg/dL    High target 130 mg/dL    D50 order 0.0 ml    D50 administered 0.00 ml    Minutes until next BG 60 min    Order initials sp     Administered initials sp    GLUCOSE, POC    Collection Time: 11/28/22  3:15 PM   Result Value Ref Range    Glucose (POC) 131 (H) 65 - 117 mg/dL    Performed by Ciarra Joe RN    GLUCOSTABILIZER    Collection Time: 11/28/22  3:16 PM   Result Value Ref Range    Glucose 131 mg/dL    Insulin order 6.3 units/hour    Insulin adminstered 6.3 units/hour    Multiplier 0.089     Low target 95 mg/dL    High target 130 mg/dL    D50 order 0.0 ml    D50 administered 0.00 ml    Minutes until next BG 60 min    Order initials sp     Administered initials sp        Microbiology Data:       Blood:negative 11/25/22, 11/27/22         Specimen Information: Endotracheal aspirate; Sputum   0 Result Notes  Component Ref Range & Units 11/25/22 1452  Resulting Agency   Special Requests:   NO SPECIAL REQUESTS  [facility]   GRAM STAIN   OCCASIONAL WBCS SEEN  [address]   GRAM STAIN   NO EPITHELIAL CELLS SEEN  [address]   [address] STAIN   NO [address] [facility]   Culture result:   NO GROWTH 2 DAYS            Pathology Results: Aortic valve leaflets, replacement:        Aortic valve leaflets with hyalinization, calcification, and myxoid   degeneration     2. Aorta, replacement:        Portion of aorta with minimal degenerative changes     Imaging:     INDICATION: r/o acute PE,; triple phase liver, assess spleen for possible  infarct     COMPARISON: Chest CT from 11/16/2022: CT abdomen pelvis from 4/6/2022      CONTRAST: 100 mL of Isovue-370. TECHNIQUE:   Helical thin section chest CT following uneventful intravenous administration of  nonionic contrast according to departmental PE protocol. Coronal and sagittal  reformats were performed. 3D/MIP post processing was performed. CT dose  reduction was achieved through use of a standardized protocol tailored for this  examination and automatic exposure control for dose modulation. Following the uneventful intravenous administration of contrast, thin axial  images were obtained through the abdomen and pelvis. Coronal and sagittal  reconstructions were generated. Oral contrast was not administered. CT dose  reduction was achieved through use of a standardized protocol tailored for this  examination and automatic exposure control for dose modulation. FINDINGS:      This is a good quality study for the evaluation of pulmonary embolism to the  first subsegmental arterial level. There is no pulmonary embolism to this level. CHEST WALL: No axillary or supraclavicular lymphadenopathy. Median sternotomy  changes are recent. Subxiphoid approach epicardial leads in position. Right  internal jugular approach pulmonary arterial catheter terminates at the main  pulmonary bifurcation. Subxiphoid approach left chest tube terminates in the  left apex. THYROID: No nodule. MEDIASTINUM: Anterior mediastinal stranding is likely postsurgical.  GEOFF: No mass or lymphadenopathy.  A few scattered reactive appearing nodes. THORACIC AORTA: No aneurysm. HEART: Enlarged. Aortic valve replacement. ESOPHAGUS: No wall thickening or dilatation. TRACHEA/BRONCHI: Patent. PLEURA: Bilateral small pleural effusions. No pneumothorax. LUNGS: No nodule or mass. Basilar dependent atelectasis. LIVER: No mass. Hepatic steatosis. BILIARY TREE: Cholecystectomy. . CBD is not dilated. SPLEEN: No masses or hypodensities. Normal enhancement characteristics. PANCREAS: No mass or ductal dilatation. ADRENALS: Unremarkable. KIDNEYS: No mass, calculus, or hydronephrosis. STOMACH: Unremarkable. SMALL BOWEL: No dilatation or wall thickening. COLON: No dilatation or wall thickening. APPENDIX: Unremarkable. PERITONEUM: Trace simple pelvic ascites. No pneumoperitoneum. RETROPERITONEUM: No lymphadenopathy or aortic aneurysm. REPRODUCTIVE ORGANS: Prostate and seminal vesicles are unremarkable. URINARY BLADDER: Strong catheter decompression. BONES: No destructive bone lesion. ABDOMINAL WALL: No mass or hernia. ADDITIONAL COMMENTS: N/A     IMPRESSION  1. Poststernotomy changes in the mediastinum. Aortic valve replacement. 2.  Bilateral small pleural effusions and dependent atelectasis. 3.  No evidence of splenic infarction or enhancing hepatic masses. 4.  Hepatic steatosis. 5.  Other findings as above. Assessment / Plan:       Mr Adrien Rodriguez is a 36year old gentleman with history of severe aortic stenosis and coronary artery disease, aortic aneurysm who is status post CABG with internal left mammary artery to LAD and aortic valve replacement with a tissue valve, repair of ascending aortic aneurysm with a graft on 11/22/2022. Found to have fevers since 11/23/22 and acute on chronic worsening of LFT elevations 11/27/22. Had a rapid covid + on 11/24/22 but negative respiratory viral panel. Of note Sars COV 2 detected 1/28/22 as well.       1) febrile illness post CABG/AV replacement   Has multiple risk factors for infection given post op, procedures, lines drains but no obvious source identified yet based on exam, labs, cultures or imaging   Other non infectious etiologies that could contribute to constellation of lab abnormalities and fever include hypercoagulable state , thrombus, inflammatory state, medications   ? Post covid sequela ( but does not explain the abrupt rise in LFT on 11/27 with preceding minimal elevations since admission )  Antibiotics can also contribute to LFT elevations but appears a significant jump such as congestion, steatosis on US  ? Choledocholithiasis/cholangitis/acalculous cholecystitis, --- GI and surgery following   For benefit of doubt will stop flagyl + cefepime and instead given Meropenem and track LFT   Started by primary team on Anidulafungin today. Would not recommend prolonged use unless know fungemia /other fungal infections. Check fungal blood culture though routine blood cultures should detect Candida if present   Consider line change/removal unnecessary lines if not other clear etiology/persistent fever   Lab abnormalities of thrombocytopenia + LFT elevation can be seen in tick borne illness -- dobut this is the case as these findings developed after hospitalization . On IV vancomycin and if repeat blood cx negative -- will consider stopping   Will consider atypical coverage ( less suspicious given history of prolonged hospitalization )  in the from of doxycycline if fever persists but would try to change antibiotic at at time to help illicit cause/effect  While CMV/EBV infections can cause this picture wt LFT -- doubt the case. Most often serologies will be + based on previous exposure. Noted PCR for viremia ordered but can see transient viremia in immunocompetent adults  Acute hepatitis panel negative   Check HIV if not done in past for completeness sake     Discussed with attending by phone    Will follow peripherally . [facility] ID to resume care 12/2/22.  Please contact me in interim if questions /concerns      Aimee Ramon, DO  5:16 PM

## 2022-11-28 NOTE — PROGRESS NOTES
1900- Report received from [facility]arr[facility].    2054- Blood transfusion started. 2059- Patient assessed. See flowsheet. 2100Sandra Valdovinos NP at bedside to check in on the patient. Orders received for a D dimer. 2138- D dimer drawn and sent. 2230- Received a phone call from [place], [place] with cardiac surgery. Updated her on the patient. No orders received at this time. 0030- Reassessed. No changes. 3216- Labs drawn and sent. 46- Reassessed. No changes. 5232- Patient bathed. Linens changed. 0700- Report given to the oncoming shift.

## 2022-11-29 ENCOUNTER — APPOINTMENT (OUTPATIENT)
Dept: GENERAL RADIOLOGY | Age: 42
End: 2022-11-29
Attending: PHYSICIAN ASSISTANT
Payer: COMMERCIAL

## 2022-11-29 LAB
ALBUMIN SERPL-MCNC: 2.7 G/DL (ref 3.5–5)
ALBUMIN SERPL-MCNC: 2.7 G/DL (ref 3.5–5)
ALBUMIN/GLOB SERPL: 0.7 {RATIO} (ref 1.1–2.2)
ALBUMIN/GLOB SERPL: 0.8 {RATIO} (ref 1.1–2.2)
ALP SERPL-CCNC: 445 U/L (ref 45–117)
ALP SERPL-CCNC: 461 U/L (ref 45–117)
ALT SERPL-CCNC: 770 U/L (ref 12–78)
ALT SERPL-CCNC: 843 U/L (ref 12–78)
ANA SER QL: NEGATIVE
ANION GAP SERPL CALC-SCNC: 6 MMOL/L (ref 5–15)
ANION GAP SERPL CALC-SCNC: 6 MMOL/L (ref 5–15)
ARTERIAL PATENCY WRIST A: NORMAL
AST SERPL-CCNC: 473 U/L (ref 15–37)
AST SERPL-CCNC: 482 U/L (ref 15–37)
BASE EXCESS BLD CALC-SCNC: 2.8 MMOL/L
BASE EXCESS BLD CALC-SCNC: 3.5 MMOL/L
BASE EXCESS BLDA CALC-SCNC: 1.3 MMOL/L
BASOPHILS # BLD: 0 K/UL (ref 0–0.1)
BASOPHILS # BLD: NORMAL K/UL (ref 0–0.1)
BASOPHILS NFR BLD: 0 % (ref 0–1)
BASOPHILS NFR BLD: NORMAL % (ref 0–1)
BDY SITE: NORMAL
BILIRUB DIRECT SERPL-MCNC: 0.5 MG/DL (ref 0–0.2)
BILIRUB SERPL-MCNC: 0.8 MG/DL (ref 0.2–1)
BILIRUB SERPL-MCNC: 1.3 MG/DL (ref 0.2–1)
BUN SERPL-MCNC: 43 MG/DL (ref 6–20)
BUN SERPL-MCNC: 47 MG/DL (ref 6–20)
BUN/CREAT SERPL: 33 (ref 12–20)
BUN/CREAT SERPL: 33 (ref 12–20)
CA-I BLD-MCNC: 1.06 MMOL/L (ref 1.12–1.32)
CA-I BLD-MCNC: 1.06 MMOL/L (ref 1.12–1.32)
CALCIUM SERPL-MCNC: 8.3 MG/DL (ref 8.5–10.1)
CALCIUM SERPL-MCNC: 8.4 MG/DL (ref 8.5–10.1)
CHLORIDE BLD-SCNC: 104 MMOL/L (ref 100–108)
CHLORIDE BLD-SCNC: 105 MMOL/L (ref 100–108)
CHLORIDE SERPL-SCNC: 104 MMOL/L (ref 97–108)
CHLORIDE SERPL-SCNC: 106 MMOL/L (ref 97–108)
CMV IGG SERPL IA-ACNC: <0.6 U/ML (ref 0–0.59)
CMV IGM SERPL IA-ACNC: <30 AU/ML (ref 0–29.9)
CO2 BLD-SCNC: 27 MMOL/L (ref 19–24)
CO2 BLD-SCNC: 27 MMOL/L (ref 19–24)
CO2 SERPL-SCNC: 29 MMOL/L (ref 21–32)
CO2 SERPL-SCNC: 29 MMOL/L (ref 21–32)
CREAT SERPL-MCNC: 1.31 MG/DL (ref 0.7–1.3)
CREAT SERPL-MCNC: 1.41 MG/DL (ref 0.7–1.3)
CREAT UR-MCNC: 1.6 MG/DL (ref 0.6–1.3)
CREAT UR-MCNC: 1.6 MG/DL (ref 0.6–1.3)
DATE LAST DOSE: ABNORMAL
DIFFERENTIAL METHOD BLD: ABNORMAL
DIFFERENTIAL METHOD BLD: NORMAL
EOSINOPHIL # BLD: 0.3 K/UL (ref 0–0.4)
EOSINOPHIL # BLD: NORMAL K/UL (ref 0–0.4)
EOSINOPHIL NFR BLD: 2 % (ref 0–7)
EOSINOPHIL NFR BLD: NORMAL % (ref 0–7)
ERYTHROCYTE [DISTWIDTH] IN BLOOD BY AUTOMATED COUNT: 15.7 % (ref 11.5–14.5)
ERYTHROCYTE [DISTWIDTH] IN BLOOD BY AUTOMATED COUNT: NORMAL % (ref 11.5–14.5)
FIO2 ON VENT: 50 %
GAS FLOW.O2 SETTING OXYMISER: 20 L/MIN
GLOBULIN SER CALC-MCNC: 3.6 G/DL (ref 2–4)
GLOBULIN SER CALC-MCNC: 3.7 G/DL (ref 2–4)
GLUCOSE BLD STRIP.AUTO-MCNC: 102 MG/DL (ref 65–117)
GLUCOSE BLD STRIP.AUTO-MCNC: 104 MG/DL (ref 65–117)
GLUCOSE BLD STRIP.AUTO-MCNC: 114 MG/DL (ref 74–106)
GLUCOSE BLD STRIP.AUTO-MCNC: 117 MG/DL (ref 74–106)
GLUCOSE BLD STRIP.AUTO-MCNC: 121 MG/DL (ref 65–117)
GLUCOSE BLD STRIP.AUTO-MCNC: 123 MG/DL (ref 65–117)
GLUCOSE BLD STRIP.AUTO-MCNC: 124 MG/DL (ref 65–117)
GLUCOSE BLD STRIP.AUTO-MCNC: 127 MG/DL (ref 65–117)
GLUCOSE BLD STRIP.AUTO-MCNC: 133 MG/DL (ref 65–117)
GLUCOSE BLD STRIP.AUTO-MCNC: 137 MG/DL (ref 65–117)
GLUCOSE BLD STRIP.AUTO-MCNC: 91 MG/DL (ref 65–117)
GLUCOSE BLD STRIP.AUTO-MCNC: 94 MG/DL (ref 65–117)
GLUCOSE BLD STRIP.AUTO-MCNC: 99 MG/DL (ref 65–117)
GLUCOSE BLD-MCNC: 101 MG/DL (ref 65–100)
GLUCOSE BLD-MCNC: 110 MG/DL (ref 65–100)
GLUCOSE BLD-MCNC: 111 MG/DL (ref 65–100)
GLUCOSE BLD-MCNC: 115 MG/DL (ref 65–100)
GLUCOSE SERPL-MCNC: 115 MG/DL (ref 65–100)
GLUCOSE SERPL-MCNC: 129 MG/DL (ref 65–100)
HBV DNA SERPL NAA+PROBE-ACNC: NORMAL IU/ML
HBV DNA SERPL NAA+PROBE-LOG IU: NORMAL LOG10 IU/ML
HCO3 BLDA-SCNC: 26 MMOL/L (ref 22–26)
HCO3 BLDA-SCNC: 28 MMOL/L
HCO3 BLDA-SCNC: 28 MMOL/L
HCT VFR BLD AUTO: 27.9 % (ref 36.6–50.3)
HCT VFR BLD AUTO: NORMAL % (ref 36.6–50.3)
HGB BLD-MCNC: 8.9 G/DL (ref 12.1–17)
HGB BLD-MCNC: NORMAL G/DL (ref 12.1–17)
HISTORY CHECKED?,CKHIST: NORMAL
IGG SERPL-MCNC: 510 MG/DL (ref 603–1613)
IGG1 SER-MCNC: 220 MG/DL (ref 248–810)
IGG2 SER-MCNC: 225 MG/DL (ref 130–555)
IGG3 SER-MCNC: 18 MG/DL (ref 15–102)
IGG4 SER-MCNC: 13 MG/DL (ref 2–96)
IMM GRANULOCYTES # BLD AUTO: 0 K/UL (ref 0–0.04)
IMM GRANULOCYTES # BLD AUTO: NORMAL K/UL (ref 0–0.04)
IMM GRANULOCYTES NFR BLD AUTO: 0 % (ref 0–0.5)
IMM GRANULOCYTES NFR BLD AUTO: NORMAL % (ref 0–0.5)
INR PPP: 1.3 (ref 0.9–1.1)
LACTATE BLD-SCNC: 1 MMOL/L (ref 0.4–2)
LACTATE BLD-SCNC: 1 MMOL/L (ref 0.4–2)
LYMPHOCYTES # BLD: 2.4 K/UL (ref 0.8–3.5)
LYMPHOCYTES # BLD: NORMAL K/UL (ref 0.8–3.5)
LYMPHOCYTES NFR BLD: 14 % (ref 12–49)
LYMPHOCYTES NFR BLD: NORMAL % (ref 12–49)
MAGNESIUM SERPL-MCNC: 2.3 MG/DL (ref 1.6–2.4)
MAGNESIUM SERPL-MCNC: 2.4 MG/DL (ref 1.6–2.4)
MCH RBC QN AUTO: 27.1 PG (ref 26–34)
MCH RBC QN AUTO: NORMAL PG (ref 26–34)
MCHC RBC AUTO-ENTMCNC: 31.9 G/DL (ref 30–36.5)
MCHC RBC AUTO-ENTMCNC: NORMAL G/DL (ref 30–36.5)
MCV RBC AUTO: 85.1 FL (ref 80–99)
MCV RBC AUTO: NORMAL FL (ref 80–99)
MITOCHONDRIA M2 IGG SER-ACNC: <20 UNITS (ref 0–20)
MONOCYTES # BLD: 1 K/UL (ref 0–1)
MONOCYTES # BLD: NORMAL K/UL (ref 0–1)
MONOCYTES NFR BLD: 6 % (ref 5–13)
MONOCYTES NFR BLD: NORMAL % (ref 5–13)
NEUTS BAND NFR BLD MANUAL: 1 %
NEUTS SEG # BLD: 13.5 K/UL (ref 1.8–8)
NEUTS SEG # BLD: NORMAL K/UL (ref 1.8–8)
NEUTS SEG NFR BLD: 77 % (ref 32–75)
NEUTS SEG NFR BLD: NORMAL % (ref 32–75)
NRBC # BLD: 0.1 K/UL (ref 0–0.01)
NRBC # BLD: NORMAL K/UL (ref 0–0.01)
NRBC BLD-RTO: 0.6 PER 100 WBC
NRBC BLD-RTO: NORMAL PER 100 WBC
PCO2 BLD: 40 MMHG (ref 35–45)
PCO2 BLD: 42.7 MMHG (ref 35–45)
PCO2 BLDA: 40 MMHG (ref 35–45)
PEEP RESPIRATORY: 10 CM[H2O]
PH BLD: 7.42 [PH] (ref 7.35–7.45)
PH BLD: 7.45 [PH] (ref 7.35–7.45)
PH BLDA: 7.43 [PH] (ref 7.35–7.45)
PLATELET # BLD AUTO: 243 K/UL (ref 150–400)
PLATELET # BLD AUTO: NORMAL K/UL (ref 150–400)
PMV BLD AUTO: 9.6 FL (ref 8.9–12.9)
PMV BLD AUTO: NORMAL FL (ref 8.9–12.9)
PO2 BLD: 105 MMHG (ref 80–100)
PO2 BLD: 67 MMHG (ref 80–100)
PO2 BLDA: 86 MMHG (ref 80–100)
POTASSIUM BLD-SCNC: 4.1 MMOL/L (ref 3.5–5.5)
POTASSIUM BLD-SCNC: 4.3 MMOL/L (ref 3.5–5.5)
POTASSIUM SERPL-SCNC: 4.3 MMOL/L (ref 3.5–5.1)
POTASSIUM SERPL-SCNC: 4.5 MMOL/L (ref 3.5–5.1)
PROCALCITONIN SERPL-MCNC: 1.48 NG/ML
PROT SERPL-MCNC: 6.3 G/DL (ref 6.4–8.2)
PROT SERPL-MCNC: 6.4 G/DL (ref 6.4–8.2)
PROTHROMBIN TIME: 12.9 SEC (ref 9–11.1)
RBC # BLD AUTO: 3.28 M/UL (ref 4.1–5.7)
RBC # BLD AUTO: NORMAL M/UL (ref 4.1–5.7)
RBC MORPH BLD: ABNORMAL
REPORTED DOSE,DOSE: ABNORMAL UNITS
REPORTED DOSE/TIME,TMG: ABNORMAL
SAO2 % BLD: 97 % (ref 92–97)
SAO2% DEVICE SAO2% SENSOR NAME: NORMAL
SERVICE CMNT-IMP: ABNORMAL
SERVICE CMNT-IMP: NORMAL
SMA IGG SER-ACNC: 4 UNITS (ref 0–19)
SODIUM BLD-SCNC: 141 MMOL/L (ref 136–145)
SODIUM BLD-SCNC: 144 MMOL/L (ref 136–145)
SODIUM SERPL-SCNC: 139 MMOL/L (ref 136–145)
SODIUM SERPL-SCNC: 141 MMOL/L (ref 136–145)
SPECIMEN SITE: ABNORMAL
SPECIMEN SITE: ABNORMAL
SPECIMEN SITE: NORMAL
TEST INFORMATION: NORMAL
VANCOMYCIN TROUGH SERPL-MCNC: 19.1 UG/ML (ref 5–10)
VT SETTING VENT: 450 ML
WBC # BLD AUTO: 17.2 K/UL (ref 4.1–11.1)
WBC # BLD AUTO: NORMAL K/UL (ref 4.1–11.1)

## 2022-11-29 PROCEDURE — 74011250636 HC RX REV CODE- 250/636: Performed by: NURSE PRACTITIONER

## 2022-11-29 PROCEDURE — 74011000250 HC RX REV CODE- 250: Performed by: INTERNAL MEDICINE

## 2022-11-29 PROCEDURE — 99291 CRITICAL CARE FIRST HOUR: CPT | Performed by: THORACIC SURGERY (CARDIOTHORACIC VASCULAR SURGERY)

## 2022-11-29 PROCEDURE — 99232 SBSQ HOSP IP/OBS MODERATE 35: CPT | Performed by: CLINICAL NURSE SPECIALIST

## 2022-11-29 PROCEDURE — 94003 VENT MGMT INPAT SUBQ DAY: CPT

## 2022-11-29 PROCEDURE — 74011000250 HC RX REV CODE- 250: Performed by: NURSE PRACTITIONER

## 2022-11-29 PROCEDURE — 87070 CULTURE OTHR SPECIMN AEROBIC: CPT

## 2022-11-29 PROCEDURE — 65610000006 HC RM INTENSIVE CARE

## 2022-11-29 PROCEDURE — 85610 PROTHROMBIN TIME: CPT

## 2022-11-29 PROCEDURE — 74011000258 HC RX REV CODE- 258: Performed by: INTERNAL MEDICINE

## 2022-11-29 PROCEDURE — 74011250636 HC RX REV CODE- 250/636: Performed by: INTERNAL MEDICINE

## 2022-11-29 PROCEDURE — 80053 COMPREHEN METABOLIC PANEL: CPT

## 2022-11-29 PROCEDURE — 99292 CRITICAL CARE ADDL 30 MIN: CPT | Performed by: THORACIC SURGERY (CARDIOTHORACIC VASCULAR SURGERY)

## 2022-11-29 PROCEDURE — 87449 NOS EACH ORGANISM AG IA: CPT

## 2022-11-29 PROCEDURE — 85025 COMPLETE CBC W/AUTO DIFF WBC: CPT

## 2022-11-29 PROCEDURE — 83735 ASSAY OF MAGNESIUM: CPT

## 2022-11-29 PROCEDURE — 99232 SBSQ HOSP IP/OBS MODERATE 35: CPT | Performed by: SURGERY

## 2022-11-29 PROCEDURE — 74011250637 HC RX REV CODE- 250/637: Performed by: NURSE PRACTITIONER

## 2022-11-29 PROCEDURE — P9047 ALBUMIN (HUMAN), 25%, 50ML: HCPCS | Performed by: NURSE PRACTITIONER

## 2022-11-29 PROCEDURE — 82248 BILIRUBIN DIRECT: CPT

## 2022-11-29 PROCEDURE — 74011250637 HC RX REV CODE- 250/637: Performed by: PHYSICIAN ASSISTANT

## 2022-11-29 PROCEDURE — 71045 X-RAY EXAM CHEST 1 VIEW: CPT

## 2022-11-29 PROCEDURE — 86665 EPSTEIN-BARR CAPSID VCA: CPT

## 2022-11-29 PROCEDURE — 82947 ASSAY GLUCOSE BLOOD QUANT: CPT

## 2022-11-29 PROCEDURE — 74011250636 HC RX REV CODE- 250/636: Performed by: THORACIC SURGERY (CARDIOTHORACIC VASCULAR SURGERY)

## 2022-11-29 PROCEDURE — 74011000250 HC RX REV CODE- 250: Performed by: THORACIC SURGERY (CARDIOTHORACIC VASCULAR SURGERY)

## 2022-11-29 PROCEDURE — C9113 INJ PANTOPRAZOLE SODIUM, VIA: HCPCS | Performed by: INTERNAL MEDICINE

## 2022-11-29 PROCEDURE — 74011000258 HC RX REV CODE- 258: Performed by: NURSE PRACTITIONER

## 2022-11-29 PROCEDURE — 84145 PROCALCITONIN (PCT): CPT

## 2022-11-29 PROCEDURE — 80202 ASSAY OF VANCOMYCIN: CPT

## 2022-11-29 PROCEDURE — 74011000250 HC RX REV CODE- 250: Performed by: ANESTHESIOLOGY

## 2022-11-29 PROCEDURE — 82803 BLOOD GASES ANY COMBINATION: CPT

## 2022-11-29 PROCEDURE — 36415 COLL VENOUS BLD VENIPUNCTURE: CPT

## 2022-11-29 RX ORDER — CALCIUM GLUCONATE 20 MG/ML
1 INJECTION, SOLUTION INTRAVENOUS ONCE
Status: COMPLETED | OUTPATIENT
Start: 2022-11-30 | End: 2022-11-30

## 2022-11-29 RX ORDER — ALBUMIN HUMAN 250 G/1000ML
12.5 SOLUTION INTRAVENOUS ONCE
Status: COMPLETED | OUTPATIENT
Start: 2022-11-29 | End: 2022-11-29

## 2022-11-29 RX ORDER — FACIAL-BODY WIPES
10 EACH TOPICAL DAILY
Status: DISCONTINUED | OUTPATIENT
Start: 2022-11-29 | End: 2022-12-05

## 2022-11-29 RX ORDER — AMOXICILLIN 250 MG
2 CAPSULE ORAL DAILY
Status: DISCONTINUED | OUTPATIENT
Start: 2022-11-29 | End: 2022-12-05

## 2022-11-29 RX ORDER — LEVOTHYROXINE SODIUM 75 UG/1
150 TABLET ORAL
Status: DISCONTINUED | OUTPATIENT
Start: 2022-11-30 | End: 2022-12-09 | Stop reason: HOSPADM

## 2022-11-29 RX ORDER — FUROSEMIDE 10 MG/ML
40 INJECTION INTRAMUSCULAR; INTRAVENOUS 2 TIMES DAILY
Status: DISCONTINUED | OUTPATIENT
Start: 2022-11-29 | End: 2022-11-29

## 2022-11-29 RX ORDER — SODIUM CHLORIDE 9 MG/ML
250 INJECTION, SOLUTION INTRAVENOUS AS NEEDED
Status: DISCONTINUED | OUTPATIENT
Start: 2022-11-29 | End: 2022-12-05

## 2022-11-29 RX ADMIN — PROPOFOL 50 MCG/KG/MIN: 10 INJECTION, EMULSION INTRAVENOUS at 16:01

## 2022-11-29 RX ADMIN — SODIUM CHLORIDE 100 MG: 9 INJECTION, SOLUTION INTRAVENOUS at 10:04

## 2022-11-29 RX ADMIN — BUSPIRONE HYDROCHLORIDE 15 MG: 5 TABLET ORAL at 15:33

## 2022-11-29 RX ADMIN — VANCOMYCIN HYDROCHLORIDE 1250 MG: 10 INJECTION, POWDER, LYOPHILIZED, FOR SOLUTION INTRAVENOUS at 03:56

## 2022-11-29 RX ADMIN — FENTANYL CITRATE 100 MCG/HR: 50 INJECTION INTRAVENOUS at 05:00

## 2022-11-29 RX ADMIN — SODIUM CHLORIDE, PRESERVATIVE FREE 10 ML: 5 INJECTION INTRAVENOUS at 15:34

## 2022-11-29 RX ADMIN — Medication 1.4 MCG/KG/HR: at 19:15

## 2022-11-29 RX ADMIN — Medication 1.4 MCG/KG/HR: at 08:07

## 2022-11-29 RX ADMIN — Medication 1.4 MCG/KG/HR: at 05:43

## 2022-11-29 RX ADMIN — HEPARIN SODIUM 5000 UNITS: 5000 INJECTION INTRAVENOUS; SUBCUTANEOUS at 23:43

## 2022-11-29 RX ADMIN — PROPOFOL 20 MCG/KG/MIN: 10 INJECTION, EMULSION INTRAVENOUS at 21:07

## 2022-11-29 RX ADMIN — CHLORHEXIDINE GLUCONATE 0.12% ORAL RINSE 10 ML: 1.2 LIQUID ORAL at 08:39

## 2022-11-29 RX ADMIN — Medication 1.4 MCG/KG/HR: at 16:01

## 2022-11-29 RX ADMIN — SODIUM CHLORIDE, PRESERVATIVE FREE 10 ML: 5 INJECTION INTRAVENOUS at 21:06

## 2022-11-29 RX ADMIN — HEPARIN SODIUM 5000 UNITS: 5000 INJECTION INTRAVENOUS; SUBCUTANEOUS at 15:34

## 2022-11-29 RX ADMIN — PHENYLEPHRINE HYDROCHLORIDE 30 MCG/MIN: 10 INJECTION INTRAVENOUS at 09:11

## 2022-11-29 RX ADMIN — Medication 1.4 MCG/KG/HR: at 21:53

## 2022-11-29 RX ADMIN — HEPARIN SODIUM 5000 UNITS: 5000 INJECTION INTRAVENOUS; SUBCUTANEOUS at 08:37

## 2022-11-29 RX ADMIN — Medication 1.4 MCG/KG/HR: at 13:48

## 2022-11-29 RX ADMIN — MEROPENEM 1 G: 1 INJECTION, POWDER, FOR SOLUTION INTRAVENOUS at 10:56

## 2022-11-29 RX ADMIN — EPINEPHRINE 3.5 MCG/MIN: 1 INJECTION INTRAMUSCULAR; INTRAVENOUS; SUBCUTANEOUS at 05:43

## 2022-11-29 RX ADMIN — SERTRALINE 100 MG: 50 TABLET, FILM COATED ORAL at 08:37

## 2022-11-29 RX ADMIN — FUROSEMIDE 40 MG: 10 INJECTION, SOLUTION INTRAMUSCULAR; INTRAVENOUS at 08:38

## 2022-11-29 RX ADMIN — SODIUM CHLORIDE 40 MG: 9 INJECTION, SOLUTION INTRAMUSCULAR; INTRAVENOUS; SUBCUTANEOUS at 08:37

## 2022-11-29 RX ADMIN — METOCLOPRAMIDE 5 MG: 5 INJECTION, SOLUTION INTRAMUSCULAR; INTRAVENOUS at 12:40

## 2022-11-29 RX ADMIN — METOCLOPRAMIDE 5 MG: 5 INJECTION, SOLUTION INTRAMUSCULAR; INTRAVENOUS at 17:38

## 2022-11-29 RX ADMIN — METOCLOPRAMIDE 5 MG: 5 INJECTION, SOLUTION INTRAMUSCULAR; INTRAVENOUS at 23:42

## 2022-11-29 RX ADMIN — POLYETHYLENE GLYCOL 3350 17 G: 17 POWDER, FOR SOLUTION ORAL at 08:37

## 2022-11-29 RX ADMIN — BUSPIRONE HYDROCHLORIDE 15 MG: 5 TABLET ORAL at 20:57

## 2022-11-29 RX ADMIN — LEVOTHYROXINE SODIUM 125 MCG: 0.12 TABLET ORAL at 05:18

## 2022-11-29 RX ADMIN — ALBUMIN HUMAN 12.5 G: 0.25 SOLUTION INTRAVENOUS at 02:48

## 2022-11-29 RX ADMIN — BUSPIRONE HYDROCHLORIDE 15 MG: 5 TABLET ORAL at 08:37

## 2022-11-29 RX ADMIN — MEROPENEM 1 G: 1 INJECTION, POWDER, FOR SOLUTION INTRAVENOUS at 17:38

## 2022-11-29 RX ADMIN — MEROPENEM 1 G: 1 INJECTION, POWDER, FOR SOLUTION INTRAVENOUS at 01:37

## 2022-11-29 RX ADMIN — ALBUMIN HUMAN 12.5 G: 0.25 SOLUTION INTRAVENOUS at 03:48

## 2022-11-29 RX ADMIN — CALCIUM GLUCONATE 1000 MG: 20 INJECTION, SOLUTION INTRAVENOUS at 23:40

## 2022-11-29 RX ADMIN — METOCLOPRAMIDE 5 MG: 5 INJECTION, SOLUTION INTRAMUSCULAR; INTRAVENOUS at 05:19

## 2022-11-29 RX ADMIN — BISACODYL 10 MG: 10 SUPPOSITORY RECTAL at 08:38

## 2022-11-29 RX ADMIN — PROPOFOL 20 MCG/KG/MIN: 10 INJECTION, EMULSION INTRAVENOUS at 04:11

## 2022-11-29 RX ADMIN — Medication 1.4 MCG/KG/HR: at 11:04

## 2022-11-29 RX ADMIN — Medication 1.4 MCG/KG/HR: at 03:13

## 2022-11-29 RX ADMIN — CASTOR OIL AND BALSAM, PERU: 788; 87 OINTMENT TOPICAL at 08:38

## 2022-11-29 RX ADMIN — FUROSEMIDE 10 MG/HR: 10 INJECTION, SOLUTION INTRAVENOUS at 01:40

## 2022-11-29 RX ADMIN — SENNOSIDES AND DOCUSATE SODIUM 2 TABLET: 50; 8.6 TABLET ORAL at 08:37

## 2022-11-29 RX ADMIN — SODIUM CHLORIDE, PRESERVATIVE FREE 10 ML: 5 INJECTION INTRAVENOUS at 05:17

## 2022-11-29 RX ADMIN — ASPIRIN 81 MG CHEWABLE TABLET 81 MG: 81 TABLET CHEWABLE at 08:37

## 2022-11-29 RX ADMIN — FUROSEMIDE 5 MG/HR: 10 INJECTION, SOLUTION INTRAMUSCULAR; INTRAVENOUS at 17:51

## 2022-11-29 RX ADMIN — PROPOFOL 20 MCG/KG/MIN: 10 INJECTION, EMULSION INTRAVENOUS at 09:44

## 2022-11-29 RX ADMIN — VANCOMYCIN HYDROCHLORIDE 1250 MG: 10 INJECTION, POWDER, LYOPHILIZED, FOR SOLUTION INTRAVENOUS at 15:34

## 2022-11-29 RX ADMIN — CASTOR OIL AND BALSAM, PERU: 788; 87 OINTMENT TOPICAL at 20:57

## 2022-11-29 NOTE — PROGRESS NOTES
Gastroenterology Daily Progress Note LOLLY Vega )   [facility]    Admit Date: 11/15/2022     Follow up of hepatitis    Subjective:       Sedated and on vent  On tuyet and epi  T 102  ID following  On eraxis, vancomycin and meopenam    EBV, HSV, Hep D, WENDY pending  Acute hepatitis panel and hep B quant neg  CMV IgG and IgM neg  AMA, ASMA neg  IgG panel not elevatd    INR stable at 1.3  T bili improved to 1.3  ALT improved to 843  AST improved to 473  Alk phos stable at 461    Current Facility-Administered Medications   Medication Dose Route Frequency    0.9% sodium chloride infusion 250 mL  250 mL IntraVENous PRN    furosemide (LASIX) injection 40 mg  40 mg IntraVENous BID    PHENYLephrine (TUYET-SYNEPHRINE) 30 mg in 0.9% sodium chloride 250 mL infusion   mcg/min IntraVENous TITRATE    bisacodyL (DULCOLAX) suppository 10 mg  10 mg Rectal DAILY    senna-docusate (PERICOLACE) 8.6-50 mg per tablet 2 Tablet  2 Tablet Oral DAILY    [START ON 11/30/2022] levothyroxine (SYNTHROID) tablet 150 mcg  150 mcg Oral 6am    balsam peru-castor oiL (VENELEX) ointment   Topical BID    anidulafungin (ERAXIS) 100 mg in 0.9% sodium chloride 130 mL IVPB  100 mg IntraVENous Q24H    heparin (porcine) injection 5,000 Units  5,000 Units SubCUTAneous Q8H    meropenem (MERREM) 1 g in 0.9% sodium chloride (MBP/ADV) 50 mL MBP  1 g IntraVENous Q8H    vancomycin (VANCOCIN) 1250 mg in  ml infusion  1,250 mg IntraVENous Q12H    metoclopramide HCl (REGLAN) injection 5 mg  5 mg IntraVENous Q6H    pantoprazole (PROTONIX) 40 mg in 0.9% sodium chloride 10 mL injection  40 mg IntraVENous DAILY    nitroGLYcerin (Tridil) 200 mcg/ml infusion  0-200 mcg/min IntraVENous TITRATE    dexmedeTOMidine in 0.9 % NaCl (PRECEDEX) 400 mcg/100 mL (4 mcg/mL) infusion soln  0.1-1.5 mcg/kg/hr IntraVENous TITRATE    fentaNYL (PF) 1,500 mcg/30 mL (50 mcg/mL) infusion  0-200 mcg/hr IntraVENous TITRATE    propofol (DIPRIVAN) 10 mg/mL infusion  0-50 mcg/kg/min IntraVENous TITRATE    0.9% sodium chloride infusion  10 mL/hr IntraVENous CONTINUOUS    HYDROmorphone (DILAUDID) injection 1 mg  1 mg IntraVENous Q4H PRN    HYDROmorphone (DILAUDID) injection 0.5 mg  0.5 mg IntraVENous Q4H PRN    niCARdipine (CARDENE) 25 mg in 0.9% sodium chloride 250 mL ([id number])  5-15 mg/hr IntraVENous TITRATE    sodium chloride (NS) flush 5-40 mL  5-40 mL IntraVENous Q8H    sodium chloride (NS) flush 5-40 mL  5-40 mL IntraVENous PRN    bacitracin 500 unit/gram packet 1 Packet  1 Packet Topical PRN    0.45% sodium chloride infusion  10 mL/hr IntraVENous CONTINUOUS    naloxone (NARCAN) injection 0.4 mg  0.4 mg IntraVENous PRN    ondansetron (ZOFRAN) injection 4 mg  4 mg IntraVENous Q4H PRN    albuterol (PROVENTIL VENTOLIN) nebulizer solution 2.5 mg  2.5 mg Nebulization Q4H PRN    aspirin chewable tablet 81 mg  81 mg Oral DAILY    midazolam (VERSED) injection 1 mg  1 mg IntraVENous Q1H PRN    chlorhexidine (PERIDEX) 0.12 % mouthwash 10 mL  10 mL Oral BID    calcium chloride 1 g in 0.9% sodium chloride 250 mL IVPB  1 g IntraVENous PRN    polyethylene glycol (MIRALAX) packet 17 g  17 g Oral DAILY    ELECTROLYTE REPLACEMENT NOTE: Nurse to review Serum Potassium and Magnesuim levels and Initiate Electrolyte Replacement Protocol as needed  1 Each Other PRN    magnesium sulfate 1 g/100 ml IVPB (premix or compounded)  1 g IntraVENous PRN    glucose chewable tablet 16 g  4 Tablet Oral PRN    glucagon (GLUCAGEN) injection 1 mg  1 mg IntraMUSCular PRN    insulin lispro (HUMALOG) injection   SubCUTAneous AC&HS    insulin glargine (LANTUS) injection 1-50 Units  1-50 Units SubCUTAneous ONCE OVER 24 HOURS    lactated ringers bolus infusion 250 mL  250 mL IntraVENous Q1H PRN    dextrose 10 % infusion 0-250 mL  0-250 mL IntraVENous PRN    melatonin tablet 3-6 mg  3-6 mg Oral QHS PRN    EPINEPHrine (ADRENALIN) 5 mg in 0.9% sodium chloride 250 mL infusion  0-10 mcg/min IntraVENous TITRATE    lidocaine 4 % patch 2 Patch  2 Patch TransDERmal Q24H    insulin regular (NOVOLIN R, HUMULIN R) 100 Units in 0.9% sodium chloride 100 mL infusion  1-25 Units/hr IntraVENous TITRATE    LORazepam (ATIVAN) tablet 1 mg  1 mg Oral Q8H PRN    sertraline (ZOLOFT) tablet 100 mg  100 mg Oral DAILY    busPIRone (BUSPAR) tablet 15 mg  15 mg Oral TID        Objective:     Visit Vitals  /61   Pulse 94   Temp (!) 102.2 °F (39 °C)   Resp 20   Ht 5' 9\" (1.753 m)   Wt 124.5 kg (274 lb 7.6 oz)   SpO2 94%   BMI 40.53 kg/m²   Blood pressure 129/61, pulse 94, temperature (!) 102.2 °F (39 °C), resp. rate 20, height 5' 9\" (1.753 m), weight 124.5 kg (274 lb 7.6 oz), SpO2 94 %. 11/29 0701 - 11/29 1900  In: 956.7 [I.V.:646.7]  Out: 1040 [Urine:990]    11/27 1901 - 11/29 0700  In: 0280 [I.V.:5503]  Out: 12120 [Urine:9550; Drains:140]      Intake/Output Summary (Last 24 hours) at 11/29/2022 1355  Last data filed at 11/29/2022 1246  Gross per 24 hour   Intake 3090.67 ml   Output 4915 ml   Net -1824.33 ml         Physical Exam:       General: acutely ill appearing young obese white male  HEENT: sclera anicteric, perrl  Skin petechiae on finger tips  Chest:  CTA, No rhonchi, rales or rubs.   Heart: S1, S2, RRR  GI: distended, soft, hypoactive/absent BS  Extremities: no edema  CNS: sedated      Labs:       Recent Results (from the past 24 hour(s))   GLUCOSE, POC    Collection Time: 11/28/22  2:04 PM   Result Value Ref Range    Glucose (POC) 113 65 - 117 mg/dL    Performed by Brynn Goodell RN    GLUCOSTABILIZER    Collection Time: 11/28/22  2:05 PM   Result Value Ref Range    Glucose 113 mg/dL    Insulin order 4.2 units/hour    Insulin adminstered 4.2 units/hour    Multiplier 0.079     Low target 95 mg/dL    High target 130 mg/dL    D50 order 0.0 ml    D50 administered 0.00 ml    Minutes until next BG 60 min    Order initials sp     Administered initials sp    GLUCOSE, POC    Collection Time: 11/28/22  3:15 PM   Result Value Ref Range    Glucose (POC) 131 (H) 65 - 117 mg/dL    Performed by Ahsan Sandra RN    GLUCOSTABILIZER    Collection Time: 11/28/22  3:16 PM   Result Value Ref Range    Glucose 131 mg/dL    Insulin order 6.3 units/hour    Insulin adminstered 6.3 units/hour    Multiplier 0.089     Low target 95 mg/dL    High target 130 mg/dL    D50 order 0.0 ml    D50 administered 0.00 ml    Minutes until next BG 60 min    Order initials sp     Administered initials sp    METABOLIC PANEL, COMPREHENSIVE    Collection Time: 11/28/22  4:02 PM   Result Value Ref Range    Sodium 137 136 - 145 mmol/L    Potassium 3.7 3.5 - 5.1 mmol/L    Chloride 102 97 - 108 mmol/L    CO2 29 21 - 32 mmol/L    Anion gap 6 5 - 15 mmol/L    Glucose 126 (H) 65 - 100 mg/dL    BUN 44 (H) 6 - 20 MG/DL    Creatinine 1.23 0.70 - 1.30 MG/DL    BUN/Creatinine ratio 36 (H) 12 - 20      eGFR >60 >60 ml/min/1.73m2    Calcium 8.4 (L) 8.5 - 10.1 MG/DL    Bilirubin, total 1.5 (H) 0.2 - 1.0 MG/DL    ALT (SGPT) 1,020 (H) 12 - 78 U/L    AST (SGOT) 707 (H) 15 - 37 U/L    Alk.  phosphatase 455 (H) 45 - 117 U/L    Protein, total 6.6 6.4 - 8.2 g/dL    Albumin 2.5 (L) 3.5 - 5.0 g/dL    Globulin 4.1 (H) 2.0 - 4.0 g/dL    A-G Ratio 0.6 (L) 1.1 - 2.2     GLUCOSE, POC    Collection Time: 11/28/22  4:14 PM   Result Value Ref Range    Glucose (POC) 118 (H) 65 - 117 mg/dL    Performed by Carmen Monge RN    GLUCOSTABILIZER    Collection Time: 11/28/22  4:15 PM   Result Value Ref Range    Glucose 118 mg/dL    Insulin order 5.2 units/hour    Insulin adminstered 5.2 units/hour    Multiplier 0.089     Low target 95 mg/dL    High target 130 mg/dL    D50 order 0.0 ml    D50 administered 0.00 ml    Minutes until next BG 60 min    Order initials spp     Administered initials spp    GLUCOSE, POC    Collection Time: 11/28/22  5:22 PM   Result Value Ref Range    Glucose (POC) 115 65 - 117 mg/dL    Performed by Carmen Monge RN    GLUCOSTABILIZER    Collection Time: 11/28/22  5:23 PM   Result Value Ref Range    Glucose 115 mg/dL    Insulin order 4.9 units/hour    Insulin adminstered 4.9 units/hour    Multiplier 0.089     Low target 95 mg/dL    High target 130 mg/dL    D50 order 0.0 ml    D50 administered 0.00 ml    Minutes until next BG 60 min    Order initials ER     Administered initials ER    GLUCOSE, POC    Collection Time: 11/28/22  6:27 PM   Result Value Ref Range    Glucose (POC) 100 65 - 117 mg/dL    Performed by Esther Stewart RN    GLUCOSTABILIZER    Collection Time: 11/28/22  6:28 PM   Result Value Ref Range    Glucose 100 mg/dL    Insulin order 3.6 units/hour    Insulin adminstered 3.6 units/hour    Multiplier 0.089     Low target 95 mg/dL    High target 130 mg/dL    D50 order 0.0 ml    D50 administered 0.00 ml    Minutes until next BG 60 min    Order initials sp     Administered initials sp    GLUCOSE, POC    Collection Time: 11/28/22  7:44 PM   Result Value Ref Range    Glucose (POC) 112 65 - 117 mg/dL    Performed by Ward Carpenter    Collection Time: 11/28/22  7:45 PM   Result Value Ref Range    Glucose 112 mg/dL    Insulin order 4.6 units/hour    Insulin adminstered 4.6 units/hour    Multiplier 0.089     Low target 95 mg/dL    High target 130 mg/dL    D50 order 0.0 ml    D50 administered 0.00 ml    Minutes until next BG 60 min    Order initials mw     Administered initials mw    GLUCOSE, POC    Collection Time: 11/28/22  8:49 PM   Result Value Ref Range    Glucose (POC) 106 65 - 117 mg/dL    Performed by Ward Carpenter    Collection Time: 11/28/22  8:49 PM   Result Value Ref Range    Glucose 106 mg/dL    Insulin order 4.1 units/hour    Insulin adminstered 4.1 units/hour    Multiplier 0.089     Low target 95 mg/dL    High target 130 mg/dL    D50 order 0.0 ml    D50 administered 0.00 ml    Minutes until next BG 60 min    Order initials mw     Administered initials mw    GLUCOSE, POC    Collection Time: 11/28/22  9:58 PM   Result Value Ref Range Glucose (POC) 118 (H) 65 - 117 mg/dL    Performed by [employer]    Collection Time: 11/28/22  9:59 PM   Result Value Ref Range    Glucose 118 mg/dL    Insulin order 5.2 units/hour    Insulin adminstered 5.2 units/hour    Multiplier 0.089     Low target 95 mg/dL    High target 130 mg/dL    D50 order 0.0 ml    D50 administered 0.00 ml    Minutes until next  min    Order initials mw     Administered initials mw    GLUCOSE, POC    Collection Time: 11/28/22 11:57 PM   Result Value Ref Range    Glucose (POC) 99 65 - 117 mg/dL    Performed by [employer]    Collection Time: 11/28/22 11:57 PM   Result Value Ref Range    Glucose 99 mg/dL    Insulin order 3.5 units/hour    Insulin adminstered 3.5 units/hour    Multiplier 0.089     Low target 95 mg/dL    High target 130 mg/dL    D50 order 0.0 ml    D50 administered 0.00 ml    Minutes until next  min    Order initials mw     Administered initials lisa    GLUCOSE, POC    Collection Time: 11/29/22  1:59 AM   Result Value Ref Range    Glucose (POC) 94 65 - 117 mg/dL    Performed by [employer]    Collection Time: 11/29/22  2:00 AM   Result Value Ref Range    Glucose 94 mg/dL    Insulin order 2.4 units/hour    Insulin adminstered 2.4 units/hour    Multiplier 0.071     Low target 95 mg/dL    High target 130 mg/dL    D50 order 0.0 ml    D50 administered 0.00 ml    Minutes until next BG 60 min    Order initials mw     Administered initials lisa    GLUCOSE, POC    Collection Time: 11/29/22  3:12 AM   Result Value Ref Range    Glucose (POC) 91 65 - 117 mg/dL    Performed by Gloria Valdez RN BSN    GLUCOSTABILIZER    Collection Time: 11/29/22  3:13 AM   Result Value Ref Range    Glucose 91 mg/dL    Insulin order 1.8 units/hour    Insulin adminstered 1.8 units/hour    Multiplier 0.057     Low target 95 mg/dL    High target 130 mg/dL    D50 order 0.0 ml    D50 administered 0.00 ml    Minutes until next BG 60 min    Order initials tf     Administered initials tf    PROCALCITONIN    Collection Time: 11/29/22  3:45 AM   Result Value Ref Range    Procalcitonin 1.48 ng/mL   PROTHROMBIN TIME + INR    Collection Time: 11/29/22  3:45 AM   Result Value Ref Range    INR 1.3 (H) 0.9 - 1.1      Prothrombin time 12.9 (H) 9.0 - 11.1 sec   VANCOMYCIN, TROUGH    Collection Time: 11/29/22  3:45 AM   Result Value Ref Range    Vancomycin,trough 19.1 (H) 5.0 - 10.0 ug/mL    Reported dose date NOT PROVIDED      Reported dose time: NOT PROVIDED      Reported dose: NOT PROVIDED UNITS   CBC WITH AUTOMATED DIFF    Collection Time: 11/29/22  3:45 AM   Result Value Ref Range    WBC PLEASE DISREGARD RESULTS 4.1 - 11.1 K/uL    RBC PLEASE DISREGARD RESULTS 4.10 - 5.70 M/uL    HGB PLEASE DISREGARD RESULTS 12.1 - 17.0 g/dL    HCT PLEASE DISREGARD RESULTS 36.6 - 50.3 %    MCV Cannot be calculated 80.0 - 99.0 FL    MCH Cannot be calculated 26.0 - 34.0 PG    MCHC Cannot be calculated 30.0 - 36.5 g/dL    RDW PLEASE DISREGARD RESULTS 11.5 - 14.5 %    PLATELET PLEASE DISREGARD RESULTS 150 - 400 K/uL    MPV PLEASE DISREGARD RESULTS 8.9 - 12.9 FL    NRBC PLEASE DISREGARD RESULTS 0  WBC    ABSOLUTE NRBC PLEASE DISREGARD RESULTS 0.00 - 0.01 K/uL    NEUTROPHILS PLEASE DISREGARD RESULTS 32 - 75 %    LYMPHOCYTES PLEASE DISREGARD RESULTS 12 - 49 %    MONOCYTES PLEASE DISREGARD RESULTS 5 - 13 %    EOSINOPHILS PLEASE DISREGARD RESULTS 0 - 7 %    BASOPHILS PLEASE DISREGARD RESULTS 0 - 1 %    IMMATURE GRANULOCYTES PLEASE DISREGARD RESULTS 0.0 - 0.5 %    ABS. NEUTROPHILS PLEASE DISREGARD RESULTS 1.8 - 8.0 K/UL    ABS. LYMPHOCYTES PLEASE DISREGARD RESULTS 0.8 - 3.5 K/UL    ABS. MONOCYTES PLEASE DISREGARD RESULTS 0.0 - 1.0 K/UL    ABS. EOSINOPHILS PLEASE DISREGARD RESULTS 0.0 - 0.4 K/UL    ABS. BASOPHILS PLEASE DISREGARD RESULTS 0.0 - 0.1 K/UL    ABS. IMM. GRANS.  PLEASE DISREGARD RESULTS 0.00 - 0.04 K/UL    DF PLEASE DISREGARD RESULTS     METABOLIC PANEL, COMPREHENSIVE    Collection Time: 11/29/22  3:45 AM   Result Value Ref Range    Sodium 139 136 - 145 mmol/L    Potassium 4.5 3.5 - 5.1 mmol/L    Chloride 104 97 - 108 mmol/L    CO2 29 21 - 32 mmol/L    Anion gap 6 5 - 15 mmol/L    Glucose 129 (H) 65 - 100 mg/dL    BUN 43 (H) 6 - 20 MG/DL    Creatinine 1.31 (H) 0.70 - 1.30 MG/DL    BUN/Creatinine ratio 33 (H) 12 - 20      eGFR >60 >60 ml/min/1.73m2    Calcium 8.4 (L) 8.5 - 10.1 MG/DL    Bilirubin, total 1.3 (H) 0.2 - 1.0 MG/DL    ALT (SGPT) 843 (H) 12 - 78 U/L    AST (SGOT) 473 (H) 15 - 37 U/L    Alk.  phosphatase 461 (H) 45 - 117 U/L    Protein, total 6.4 6.4 - 8.2 g/dL    Albumin 2.7 (L) 3.5 - 5.0 g/dL    Globulin 3.7 2.0 - 4.0 g/dL    A-G Ratio 0.7 (L) 1.1 - 2.2     BILIRUBIN, DIRECT    Collection Time: 11/29/22  3:45 AM   Result Value Ref Range    Bilirubin, direct 0.5 (H) 0.0 - 0.2 MG/DL   MAGNESIUM    Collection Time: 11/29/22  3:45 AM   Result Value Ref Range    Magnesium 2.3 1.6 - 2.4 mg/dL   GLUCOSE, POC    Collection Time: 11/29/22  4:21 AM   Result Value Ref Range    Glucose (POC) 127 (H) 65 - 117 mg/dL    Performed by Colton Monsalve    Collection Time: 11/29/22  4:21 AM   Result Value Ref Range    Glucose 127 mg/dL    Insulin order 3.8 units/hour    Insulin adminstered 3.8 units/hour    Multiplier 0.057     Low target 95 mg/dL    High target 130 mg/dL    D50 order 0.0 ml    D50 administered 0.00 ml    Minutes until next BG 60 min    Order initials mw     Administered initials mw    CBC WITH AUTOMATED DIFF    Collection Time: 11/29/22  4:51 AM   Result Value Ref Range    WBC 17.2 (H) 4.1 - 11.1 K/uL    RBC 3.28 (L) 4.10 - 5.70 M/uL    HGB 8.9 (L) 12.1 - 17.0 g/dL    HCT 27.9 (L) 36.6 - 50.3 %    MCV 85.1 80.0 - 99.0 FL    MCH 27.1 26.0 - 34.0 PG    MCHC 31.9 30.0 - 36.5 g/dL    RDW 15.7 (H) 11.5 - 14.5 %    PLATELET 016 947 - 293 K/uL    MPV 9.6 8.9 - 12.9 FL    NRBC 0.6 (H) 0  WBC    ABSOLUTE NRBC 0.10 (H) 0.00 - 0.01 K/uL NEUTROPHILS 77 (H) 32 - 75 %    BAND NEUTROPHILS 1 %    LYMPHOCYTES 14 12 - 49 %    MONOCYTES 6 5 - 13 %    EOSINOPHILS 2 0 - 7 %    BASOPHILS 0 0 - 1 %    IMMATURE GRANULOCYTES 0 0.0 - 0.5 %    ABS. NEUTROPHILS 13.5 (H) 1.8 - 8.0 K/UL    ABS. LYMPHOCYTES 2.4 0.8 - 3.5 K/UL    ABS. MONOCYTES 1.0 0.0 - 1.0 K/UL    ABS. EOSINOPHILS 0.3 0.0 - 0.4 K/UL    ABS. BASOPHILS 0.0 0.0 - 0.1 K/UL    ABS. IMM. GRANS. 0.0 0.00 - 0.04 K/UL    DF MANUAL      RBC COMMENTS ANISOCYTOSIS  1+       RBC, ALLOCATE    Collection Time: 11/29/22  5:00 AM   Result Value Ref Range    HISTORY CHECKED?  Historical check performed    GLUCOSE, POC    Collection Time: 11/29/22  5:27 AM   Result Value Ref Range    Glucose (POC) 123 (H) 65 - 117 mg/dL    Performed by Jewel Chiles    Collection Time: 11/29/22  5:27 AM   Result Value Ref Range    Glucose 123 mg/dL    Insulin order 3.6 units/hour    Insulin adminstered 3.6 units/hour    Multiplier 0.057     Low target 95 mg/dL    High target 130 mg/dL    D50 order 0.0 ml    D50 administered 0.00 ml    Minutes until next BG 60 min    Order initials      Administered initials     GLUCOSE, POC    Collection Time: 11/29/22  6:31 AM   Result Value Ref Range    Glucose (POC) 121 (H) 65 - 117 mg/dL    Performed by Jewel Chiles    Collection Time: 11/29/22  6:31 AM   Result Value Ref Range    Glucose 121 mg/dL    Insulin order 3.5 units/hour    Insulin adminstered 3.5 units/hour    Multiplier 0.057     Low target 95 mg/dL    High target 130 mg/dL    D50 order 0.0 ml    D50 administered 0.00 ml    Minutes until next BG 60 min    Order initials      Administered initials     GLUCOSE, POC    Collection Time: 11/29/22  7:44 AM   Result Value Ref Range    Glucose (POC) 133 (H) 65 - 117 mg/dL    Performed by Jewel Chiles    Collection Time: 11/29/22  7:44 AM   Result Value Ref Range    Glucose 133 mg/dL    Insulin order 4.9 units/hour Insulin adminstered 4.9 units/hour    Multiplier 0.067     Low target 95 mg/dL    High target 130 mg/dL    D50 order 0.0 ml    D50 administered 0.00 ml    Minutes until next BG 60 min    Order initials mw     Administered initials mw    BLOOD GAS, ARTERIAL    Collection Time: 11/29/22  8:16 AM   Result Value Ref Range    pH 7.43 7.35 - 7.45      PCO2 40 35 - 45 mmHg    PO2 86 80 - 100 mmHg    O2 SAT 97 92 - 97 %    BICARBONATE 26 22 - 26 mmol/L    BASE EXCESS 1.3 mmol/L    O2 METHOD VENT      FIO2 50 %    Tidal volume 450.0      SET RATE 20      PEEP/CPAP 10.0      Sample source ARTERIAL      SITE DRAWN FROM ARTERIAL LINE      MARSHA'S TEST NOT APPLICABLE     GLUCOSE, POC    Collection Time: 11/29/22  8:47 AM   Result Value Ref Range    Glucose (POC) 137 (H) 65 - 117 mg/dL    Performed by Adria Chilel RN    GLUCOSTABILIZER    Collection Time: 11/29/22  8:48 AM   Result Value Ref Range    Glucose 137 mg/dL    Insulin order 5.9 units/hour    Insulin adminstered 5.9 units/hour    Multiplier 0.077     Low target 95 mg/dL    High target 130 mg/dL    D50 order 0.0 ml    D50 administered 0.00 ml    Minutes until next BG 60 min    Order initials af     Administered initials af    GLUCOSE, POC    Collection Time: 11/29/22  9:52 AM   Result Value Ref Range    Glucose (POC) 124 (H) 65 - 117 mg/dL    Performed by Adria Chilel RN    GLUCOSTABILIZER    Collection Time: 11/29/22  9:52 AM   Result Value Ref Range    Glucose 124 mg/dL    Insulin order 4.9 units/hour    Insulin adminstered 4.9 units/hour    Multiplier 0.077     Low target 95 mg/dL    High target 130 mg/dL    D50 order 0.0 ml    D50 administered 0.00 ml    Minutes until next BG 60 min    Order initials af     Administered initials af    GLUCOSE, POC    Collection Time: 11/29/22 11:00 AM   Result Value Ref Range    Glucose (POC) 102 65 - 117 mg/dL    Performed by Adria Chilel RN    GLUCOSTABILIZER    Collection Time: 11/29/22 11:00 AM   Result Value Ref Range    Glucose 102 mg/dL    Insulin order 3.2 units/hour    Insulin adminstered 3.2 units/hour    Multiplier 0.077     Low target 95 mg/dL    High target 130 mg/dL    D50 order 0.0 ml    D50 administered 0.00 ml    Minutes until next BG 60 min    Order initials af     Administered initials af    BLOOD GAS,CHEM8,LACTIC ACID POC    Collection Time: 11/29/22 12:18 PM   Result Value Ref Range    Calcium, ionized (POC) 1.06 (L) 1.12 - 1.32 mmol/L    pH (POC) 7.42 7.35 - 7.45      pCO2 (POC) 42.7 35.0 - 45.0 MMHG    pO2 (POC) 105 (H) 80 - 100 MMHG    BICARBONATE 28 mmol/L    Base excess (POC) 2.8 mmol/L    Sample source ARTERIAL      CO2, POC 27 (H) 19 - 24 MMOL/L    Sodium,  136 - 145 MMOL/L    Potassium, POC 4.3 3.5 - 5.5 MMOL/L    Chloride,  100 - 108 MMOL/L    Glucose,  (H) 74 - 106 MG/DL    Creatinine, POC 1.6 (H) 0.6 - 1.3 MG/DL    Lactic Acid (POC) 1.00 0.40 - 2.00 mmol/L   GLUCOSTABILIZER    Collection Time: 11/29/22 12:28 PM   Result Value Ref Range    Glucose 117 mg/dL    Insulin order 4.4 units/hour    Insulin adminstered 4.4 units/hour    Multiplier 0.077     Low target 95 mg/dL    High target 130 mg/dL    D50 order 0.0 ml    D50 administered 0.00 ml    Minutes until next BG 60 min    Order initials af     Administered initials af    LABRCNT(wbc:2,hgb:2,hct:2,plt:2,)  Recent Labs     11/29/22  0345 11/28/22  1602 11/28/22  0600 11/27/22  1534 11/27/22  0425 11/27/22  0009    137 138   < > 138 138   K 4.5 3.7 4.2   < > 4.6 4.6    102 103   < > 105 105   CO2 29 29 27   < > 28 28   BUN 43* 44* 43*   < > 45* 48*   CREA 1.31* 1.23 1.29   < > 1.15 1.34*   * 126* 119*   < > 121* 100   CA 8.4* 8.4* 8.1*   < > 7.6* 7.7*   MG 2.3  --  2.3  --  2.7* 2.7*   PHOS  --   --   --   --   --  4.8*    < > = values in this interval not displayed.    LABRCNT(sgot:3,gpt:3,ap:3,tbiL:3,TP:3,ALB:3,GLOB:3,ggt:3,aml:3,amyp:3,lpse:3,hlpse:3)  Recent Labs     11/29/22  0345 11/28/22  0600 11/27/22  1652 11/27/22  1534   INR 1.3* 1.3*  --  1.3*   PTP 12.9* 13.6*  --  13.3*   APTT  --   --  28.9  --      Recent Labs     11/29/22  0345 11/28/22  1602 11/28/22  0600 11/27/22  1534 11/27/22  0847   * 455* 461*   < >  --    TP 6.4 6.6 6.2*   < >  --    ALB 2.7* 2.5* 2.4*   < >  --    GLOB 3.7 4.1* 3.8   < >  --    AML  --   --   --   --  28   LPSE  --   --   --   --  76    < > = values in this interval not displayed. BRIEFLAB(B12,FOL,FOLAT,RBCF)No results found for: FOL, RBCFLABRCNT(CPK:3,CpKMB:3,ckndx:3,troiq:3)No components found for: GLPOCBRIEFLAB(CHOL,CHOLX,CHOLP,CHLST,CHOLV,HDL,HDLC,HDLP,LDL,DLDL,LDLC,DLDLP,TGL,TGLX,TRIGL,TRIGP,CHHD,CHHDX)  Recent Labs     11/29/22  0816 11/28/22  0848   PH 7.43 7.39   PCO2 40 47*   PO2 86 84   LABRCNT(CPK:3,CpKMB:3,ckndx:3,troiq:3)LOLLY Priest  No results for input(s): CPK, CKNDX, TROIQ in the last 72 hours. No lab exists for component: CPKMBMEShannon LOLLY Leija      Problem List:     Active Problems:    Acute non-Q wave non-ST elevation myocardial infarction (NSTEMI) ([address] (11/15/2022)      Aortic stenosis (11/22/2022)      CAD (coronary artery disease) (11/22/2022)      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)      Overview: On pump CORONARY ARTERY BYPASS GRAFT  X 1 WITH LEFT INTERNAL MAMMARY       ARTERY to LAD      AORTIC VALVE REPLACEMENT WITH MEYER 25MM INSPIRIS RESILIA TISSUE VALVE       REPAIR OF ASCENDING AORTIC ANEURYSM with 26mm hemashield graft      S/P AVR (aortic valve replacement) and aortoplasty (11/22/2022)      Overview:  On pump CORONARY ARTERY BYPASS GRAFT  X 1 WITH LEFT INTERNAL MAMMARY       ARTERY to LAD      AORTIC VALVE REPLACEMENT WITH MEYER 25MM INSPIRIS RESILIA TISSUE VALVE       REPAIR OF ASCENDING AORTIC ANEURYSM with 26mm hemashield graft    Assessment/Plan:  Mr. Ann Whiting is a 43yo with T2DM on insulin A1C 9-10, hypothyroidism on LT4, s/p on-pump CABG 11/22/2022 with tissue aortic valve replacement and ascending aortic aneurysm repair due to bicuspic AV, COVID positive rapic PCR negative 11/23, worsening hypoxemic respiratory failure s/p reintubation 11/24/2022 now with worsening PEEP and FiO2 requirements, severe SERGE 11/24 Cr of 2 now 1.33, with severe acute hepatitis. Personally reviewed CT triple phase liver with Dr. Venkat Austin of interventional radiology. No hepatic vein dilation, no clots, portal venous system intact and no clot, no hepatic infarct. This is good news. Awaiting serologies for other causes of liver injury. Nuzhat Clearys #s personally reviewed and are not concerning. Needs daily INR and LFTs. With fever 102.4, awaiting Bcx. Peripheral smear with erythroleukamoid rxn nonspecific but no hemolysis. Discussed at length with ICU team, interventional radiology, RN. Prognosis is guarded. Remains critically ill and is at high risk of decompensation if not death. **    Recall:  Reviewed all catheterization and operative notes since admission. US 11/27/2022 with hepatomegaly with diffuse echocenicity limited views, as well as splenomegaly. CT 4/2022 with normal appearing liver and spleen. 11/15/2022, and even September and April of 2022 and 12/2021 with normal LFTs. Since 11/22pm he has had progressive worsening of transaminitis in the 100-300range ALT, 260-350 AST with intermittent mild hyperbilirubinemia of 1.1-1.3.      11/26/2022 LFTs alb 2.7, Tb 1.2, , ,   11/27/2022 LFTs alb 2.7, Tb 1.1, ALT 1137, AST >2000,      He has had mild thrombmocytopenia ranging 144-183 throughout 2022 and the trough was 107 on 11/24. No INR since 11/22 and it was normal at 1.1. This is a critical hepatitis, but his liver function appears intact so far. Usually if ischemic from surgery, the peak is sooner after the event then downtrends. He did have some mild worsening hepatitis which peaked 11/24 and was down trending slowly. He has an acute critical hepatitis insult. Unlikely to be an acute viral infection, but will send studies. Main concern is an acute thrombosis or severe congestion now that he has splenomegaly and diffuse congestion in liver. Reviewed MAR at length for the past 10 days. Minimal use of tylenol, as well as amiodarone started 11/23. He has been on continuous high-intensity statin rosuvastatin 40mg daily, recommend temporary holding of this. If possible, recommend holding amiodarone. I do recommend empiric NAC protocol. Will defer to ICU team to order as there may be issues with volume overload. Will send serologies for viral hepatitis infections, including hepatitis D superinfection and HSV (strongly doubt), but also CMV and EBV. Will send autoimmune hepatitis studies. Again, I doubt this is a cause. There is a preliminary read of limited TTE by Dr. Álvaro Roche today - normal appearing R heart function and trace pericardial effusion. That is good news. His mean PAP on Bryan catheter has been slowly increasing the past few days and is >30.     LOLLY Kolb    11/29/2022 [address] [facility], [address] [place] [address]  [address] [id number]  [facility] [place] [address] [place]: [phone]

## 2022-11-29 NOTE — PROGRESS NOTES
Critical Care Note      Cardiac surgery was called for the evaluation and management of: acute respiratory failure; acute hepatitis; sepsis     The critical nature of the patient's condition includes the following: emergent reintubation and multisystem organ failure      Diagnosis for which the procedure was performed: AS     Procedure performed: AVR Asc CABGx1     Other critical care diagnoses that are being treated and are above and beyond the standard care for the procedure being performed:     Severe acute hepatitis  Colonic distention  Delayed gastric emptying        HPI: Patient underwent an uncomplicated AVR Ascending aortic replacement but experienced refractory hypoxemia and was emergently reintubated the night of POD1. He has had fevers, acute hepatitis and  prolonged respiratory failure of unexplained etiology.      Acute hypoxemic respiratory failure  Fio2 down to 50% and PEEP 10 - paO2 86 at these settings on repeat ABG  CTA neg for PE (will stop hep gtt)  SYLWIA normal, repeat TTE OK; no shunt  Small bilateral effusions  Negative fluid balance with lasix gtt yesterday; will try IV bid diuresis today  D/w intensivist - will continue to wean PEEP      Severe acute hepatitis  LFTs trending down significantly  Adequate perfusion, normal lactate  SYLWIA with normal biv function and normal AVR prosthesis  Negative CTA for thrombus  Will continue to optimize perfusion and keep aggressive diuresis     Sepsis / Fever of unknown origin  Continues to have persistent fevers  Repeat Blood cx NGTD; procalcitonin not significantly elevated  On vanc meropenem flagyl now eracsis  Discussed over telephone yesterday with Dr. Niecy Ibarra who changed to Meropenem       Ileus  Will hold trickle feeds until reduced OG output per general surgery suggestions  Suppository today      Discussed plan with ICU physician and bedside nurse     Total critical care time - 85 minutes (CPT 87077, 99292x1)      I personally spent the above critical care time. This is time spent at this critically ill patient's bedside / unit / floor actively involved in patient care as well as the coordination of care. This does not include any procedural time which has been billed separately. This does not include any NP/PA patient care time. I had a face to face encounter with the patient, reviewed and interpreted patient data including clinical events, labs, images, vital signs, I/O's, and examined patient. I have discussed the case and the plan and management of the patient's care with the consulting services and bedside nurses. This patient has a high probability of imminent, clinically significant deterioration, which requires the highest level of preparedness to intervene urgently. I participated in the decision-making and personally managed or directed the management of life and organ supporting interventions to treat or prevent imminent deterioration. This patient has a critical illness or injury that is acutely impairing one or more vital organ systems such that there is a high probability of imminent or life threatening deterioration in the patient's condition. This patient requires high complexity medical decision making to assess, manipulate, and support vital organ system failure. After stabilization, this patient still requires management to prevent further life / organ threatening deterioration.

## 2022-11-29 NOTE — PROGRESS NOTES
Stable with support. D/W Jorge Hearn. Would DC lines and wean vent and follow clinically at this point. EF is well preservedl

## 2022-11-29 NOTE — PROGRESS NOTES
Pharmacy Automatic Renal Dosing Protocol - Antimicrobials    Indication for Antimicrobials: sepsis     Current Regimen of Each Antimicrobial:   Vancomycin - pharmacy to dose; started ; day 5  Meropenem 1 gm IV q8h; started ; day 2  Anidulafungin 100 mg q24h; started ; day 2    Previous Antimicrobial Therapy:   Periop cefazolin and vanc   Metronidazole 500 mg IV q12h; started 11/25 x 4 days  Cefepime 2g IV q8h ; started 11/25 x 4 days     Goal Level: VANCOMYCIN TROUGH GOAL RANGE  Vancomycin Trough: 15 - 20 mcg/mL  (AUC: 400 - 600 mg/hr/Liter/day)     Date Dose & Interval Measured (mcg/mL) Extrapolated (mcg/mL)    S/p load 8 -    1000 mg q12h 9.9 430    03:45 1250 mg q12h 19.1 18.41 /      Dosing calculator used: Excel-based calculator (poor model fit with [employer])    Significant Cultures:    blood cx NGTD pending   Sputum cx (ETT aspirate) NGTD, pending    blood cx NGTD pending   blood for fungus - pending    Labs:  Recent Labs     22  0451 22  0345 22  1602 22  0600   CREA  --  1.31* 1.23 1.29   BUN  --  43* 44* 43*   WBC 17.2* PLEASE DISREGARD RESULTS  --  16.5*     Temp (24hrs), Av.3 °F (37.4 °C), Min:96.1 °F (35.6 °C), Max:102.6 °F (39.2 °C)    Creatinine Clearance (mL/min) or Dialysis: 72.7 mL/min (IBW)    Impression/Plan:   The vancomycin level resulted at 19.1 mcg/ml. Continue vancomycin 1250 mg IV q12h for a predicted AUC of 584. Continue meropenem and anidulafungin as above  Antimicrobial stop date pending     Pharmacy will follow daily and adjust medications as appropriate for renal function and/or serum levels.     Thank you,  Eric Sanford, PHARMD

## 2022-11-29 NOTE — DIABETES MGMT
[facility]    CLINICAL NURSE SPECIALIST CONSULT     Initial Presentation   Kandis Heredia is a 36 y.o. male admitted 11/15/22 after experiencing chest pain. Afebrile. Hypertensive. 02 sats 100%  LAB: WBC 8.7. Normal H&H. /AG 3. Normal kidney & liver parameters. NT pro-; troponin 714  CXR:  Mild interstitial pulmonary edema versus interstitial pneumonia. No   pneumothorax. Consider PA and lateral chest views when the patient can better   tolerate. HX:   Past Medical History:   Diagnosis Date    Anxiety and depression     anxiety, depression    Bleeding of eye, left     8/17/21 pt reports receiving injections in right eye for beeding    Chronic headaches 2007    COVID-19 12/20/2020    Diabetes type 1, uncontrolled     since 8years old    GERD (gastroesophageal reflux disease)     Hypercholesterolemia     Hypertension     Hypothyroidism     MI (myocardial infarction) ([facility] [address]     as of 8/17/21 pt reports 9 stents total    WALLY on CPAP       INITIAL DX:   Acute non-Q wave non-ST elevation myocardial infarction (NSTEMI) ([facility] [address] [I21.4]  Aortic stenosis [I35.0]  CAD (coronary artery disease) [I25.10]     Current Treatment     TX: 11/22/22 On pump CORONARY ARTERY BYPASS GRAFT  X 1 WITH LEFT INTERNAL MAMMARY ARTERY to LAD  AORTIC VALVE REPLACEMENT WITH MEYER 25MM INSPIRIS RESILIA TISSUE VALVE   REPAIR OF ASCENDING AORTIC ANEURYSM with 26mm hemashield graft    Consulted by Provider for advanced diabetes nursing assessment and care for:   [x] Transitioning off Abner Molina   [x] Inpatient management strategy  [] Home management assessment  [] Survival skill education    Hospital Course   Clinical progress has been complicated by need for ICU level of care. 11/24/22 Overnight developed hypoxemia. Underwent bronch and SYLWIA, neither of which explained cause for hypoxemia. He required re-intubation and was put on veletri. 11/25/22 Intermittent desaturation events. CXR this a.m. with pulmonary edema  11/27/22 Concern for hepatic ischemia/infarction with progressive trnasaminitis. GI consulted determines severe hepatitis  11/29/22 Sedated on Precedex & Propofol. On C vent support Fi02 50%/Peep 8. Trying to transition off epi to baldev infusions. Will restart Tfs. NG 2 suction at the moment. 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 Max daily dose of 150 units.            Diabetes self-management practices:   Eating pattern - Eats 3 meals on most days and snacks in the evening   [x] Not eating a carbohydrate-controlled mealplan  Physical activity pattern   [x] Not employing a physical activity program to control BG  Monitoring pattern   [x] Testing BGs   [x] Breakfast 300s  [x] Lunch  100-200s  [x] Dinner  100-200s  Taking medications pattern  [x] Consistent administration  [x] Affordable  Overall evaluation:    [x] Not achieving A1c target with drug therapy & self-care practices    Subjective   Intubated     Objective   Physical exam  General Obese male who is quite ill  Neuro  On Propofol, Precedex & Fentanyl infusions  Vital Signs Visit Vitals  /61   Pulse 89   Temp (!) 102.2 °F (39 °C)   Resp 20   Ht 5' 9\" (1.753 m)   Wt 124.5 kg (274 lb 7.6 oz)   SpO2 95%   BMI 40.53 kg/m²     Laboratory  Recent Labs     11/29/22  0451 11/29/22  0345 11/28/22  1602 11/28/22  0600   GLU  --  129* 126* 119*   AGAP  --  6 6 8   WBC 17.2* PLEASE DISREGARD RESULTS  --  16.5*   CREA  --  1.31* 1.23 1.29   AST  --  473* 707* 1,010*   ALT  --  843* 1,020* 1,110*     Factors impacting BG management  Factor Dose Comments   Nutrition:  NPO    Marked abdominal distention - for CT   Drugs:  Vasopressor load  Atypical antipsychotics   Epi infusion  Buspar 3XB. Zoloft D   Affects insulin delivery     Pain Dilaudid infusion    Infection Merrem Q8 hrs  Vanc Q12 hrs  Eraxis Q24 hrs Fever. WBC elevated   Other:   Kidney function  Liver function  NT pro-BNP   SERGE  AST/ALT elevated  2833 (11/24/22)      Blood glucose pattern      Significant diabetes-related events over the past 24-72 hours  11/15/22 Admission   Total insulin requirements without achieving target Bgs until 11/20/22:    11/21/22 Started on Lenor Zeeland  11/23/22 Using 5-10 units/hr of insulin this morning  11/28/22 Using 2-6 units/hr of insulin  11/29/22 Continues to use 3-6 units/hr of insulin    Assessment and Plan   Nursing Diagnosis Risk for unstable blood glucose pattern   Nursing Intervention Domain 2135 Decision-making Support   Nursing Interventions Examined current inpatient diabetes/blood glucose control   Explored factors facilitating and impeding inpatient management  Explored corrective strategies with patient and responsible inpatient provider   Informed patient of rational for insulin strategy while hospitalized     Evaluation   This 36year old  male with known CAD was admitted with chest pain; he underwent CABG & AVR. Patient has known Type 1 diabetes with poor control going into this surgery.  Patient required 260 units/D prior to the surgery (during this admission) without consistently achieving BG targets. Has been on Glucostabilizer since surgery 11/22/22; using 3-6 units/hour. Very ill. Remains on vent & pressor support. Recommendations     [x] Glucostabilizer    Billing Code(s)   [x] [id number]  subsequent hospital care - 25 minutes     Before making these care recommendations, I personally reviewed the hospitalization record, including notes, laboratory & diagnostic data and current medications, and examined the patient at the bedside (circumstances permitting) before making care recommendations. More than fifty (50) percent of the time was spent in patient counseling and/or care coordination.   Total minutes: [address] [place], CNS  Diabetes Clinical Nurse Specialist  [facility]  Access via [facility]

## 2022-11-29 NOTE — PROGRESS NOTES
[facility] ICU Progress Note    Admit Date: 11/15/2022  POD:  6 Day Post-Op    Procedure:  Procedure(s):  SYLWIA BY DR David Cedeno -  CORONARY ARTERY BYPASS GRAFT  X 1 WITH LEFT INTERNAL MAMMARY ARTERY GRAFTING - AORTIC VALVE REPLACEMENT WITH MEYER 25MM INSPIRIS RESILIA TISSUE VALVE AND REPAIR OF ASCENDING AORTIC ANEURYSM        Subjective/Interval:   Pt seen with Dr. Malik Aleman. Pt intubated and sedated. On Vent 50% PEEP 10. Tmax 102.6, cooling blanket needs to be placed under pt    On Epi @ 3.,05 Insulin and gtts, Dex/ Fent/ Prop gtts for sedation      Objective:   Vitals:  Blood pressure (!) 127/55, pulse 91, temperature (!) 102.7 °F (39.3 °C), resp. rate 20, height 5' 9\" (1.753 m), weight 274 lb 7.6 oz (124.5 kg), SpO2 96 %.   Temp (24hrs), Av.5 °F (37.5 °C), Min:96.1 °F (35.6 °C), Max:102.7 °F (39.3 °C)    Hemodynamics:   CO: CO (l/min): 2.9 l/min   CI: CI (l/min/m2): 2.9 l/min/m2   CVP: CVP (mmHg): 13 mmHg (22 0845)   SVR: SVR (dyne*sec)/cm5: 1092 (dyne*sec)/cm5 (22 9911)   PAP Systolic: PAP Systolic: 29 (43/00/70 4246)   PAP Diastolic: PAP Diastolic: 21 (62/10/53 8948)   PVR:     SV02: SVO2 (%): 6.6 % (22 0615)   SCV02:      EKG/Rhythm:  NSR 80-90s    NGT outout: 7765mL in 24hrs    CT Output: 70mL in 24 hrs    Ventilator:  Ventilator Volumes  Vt Set (ml): 450 ml (22 0746)  Vt Exhaled (Machine Breath) (ml): 471 ml (22 0746)  Vt Spont (ml): 455 ml (22 8109)  Ve Observed (l/min): 9.45 l/min (22 0746)    Oxygen Therapy:  Oxygen Therapy  O2 Sat (%): 96 % (22)  Pulse via Oximetry: 91 beats per minute (2245)  O2 Device: Endotracheal tube (22)  Skin Assessment: Clean, dry, & intact (22)  Skin Protection for O2 Device: Yes (22)  Orientation: Anterior;Bilateral (22)  Location: Cheek (22)  Interventions: Skin Barrier (22)  O2 Flow Rate (L/min): 6 l/min (22)  FIO2 (%): 50 % (per MD) (11/29/22 0746)    CXR  CXR Results  (Last 48 hours)                 11/29/22 0546  XR CHEST PORT Final result    Impression:  No significant change. Narrative:  INDICATION:  post op       EXAM: CXR Portable. FINDINGS: Portable chest shows satisfactory support lines/devices without   significant change since yesterday. There is no apparent pneumothorax. Lungs   show stable bibasilar nonspecific haziness. Heart size is stable. There is no   overt pulmonary edema. 11/28/22 0457  XR CHEST PORT Final result    Impression:  No significant change. Narrative:  INDICATION: post op       EXAMINATION:  AP CHEST, PORTABLE       COMPARISON: 11/27/2022       FINDINGS: Single AP portable view of the chest demonstrates no change in   position of the lines and tubes. The cardiomediastinal silhouette is unchanged. Bibasilar atelectasis/effusions without significant change. No pneumothorax. Admission Weight: Last Weight   Weight: 257 lb 15 oz (117 kg) Weight: 274 lb 7.6 oz (124.5 kg)     Intake / Output / Drain:  Current Shift: 11/29 0701 - 11/29 1900  In: 200   Out: 300 [Urine:250]  Last 24 hrs.:   Intake/Output Summary (Last 24 hours) at 11/29/2022 1000  Last data filed at 11/29/2022 0921  Gross per 24 hour   Intake 2947.95 ml   Output 5385 ml   Net -2437.05 ml     EXAM:  General: Intubated, sedated. Lungs:    Coarse to auscultation bilaterally   Incision:  Incision clean, dry and intact and prineo intact   Heart:   Regular rate and rhythm  and no murmurs, rubs or gallops   Abdomen:    Distended, firm, hypoactive bowel sounds, and obese. Extremities:  +1-2 pitting edema BLE, BUE   Neurologic:  Intubated, sedated.      Labs:   Recent Labs     11/29/22  0952 11/29/22  0527 11/29/22  0451 11/29/22  0421 11/29/22  0345   WBC  --   --  17.2*  --  PLEASE DISREGARD RESULTS   HGB  --   --  8.9*  --  PLEASE DISREGARD RESULTS   HCT  --   --  27.9*  --  PLEASE DISREGARD RESULTS   PLT --   --  243  --  PLEASE DISREGARD RESULTS   NA  --   --   --   --  139   K  --   --   --   --  4.5   BUN  --   --   --   --  43*   CREA  --   --   --   --  1.31*   GLU  --   --   --   --  129*   GLUCPOC 124*   < >  --    < >  --    INR  --   --   --   --  1.3*    < > = values in this interval not displayed. Assessment:     Active Problems:    Acute non-Q wave non-ST elevation myocardial infarction (NSTEMI) ([address] (11/15/2022)      Aortic stenosis (11/22/2022)      CAD (coronary artery disease) (11/22/2022)      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)      Overview: On pump CORONARY ARTERY BYPASS GRAFT  X 1 WITH LEFT INTERNAL MAMMARY       ARTERY to LAD      AORTIC VALVE REPLACEMENT WITH MEYER 25MM INSPIRIS RESILIA TISSUE VALVE       REPAIR OF ASCENDING AORTIC ANEURYSM with 26mm hemashield graft      S/P AVR (aortic valve replacement) and aortoplasty (11/22/2022)      Overview: On pump CORONARY ARTERY BYPASS GRAFT  X 1 WITH LEFT INTERNAL MAMMARY       ARTERY to LAD      AORTIC VALVE REPLACEMENT WITH MEYER 25MM INSPIRIS RESILIA TISSUE VALVE       REPAIR OF ASCENDING AORTIC ANEURYSM with 26mm hemashield graft       Plan/Recommendations/Medical Decision Making:     Severe symptomatic AS, s/p tissue AVR. Continue ASA. CAD, STEMI, s/p CABG. Continue ASA. Holding amio, statin for transaminitis. Consider BB when off pressors. repeat Echo without effusion or tamponade, NL LV/RVF, CI maintaining > 2 for last 24hrs, remains hypotensive. Will Add Raymond gtt for BP management, wean Epi as tolerated today. If able to come off Epi by end of day can likely remove Novi. Remove CT today. Acute hypoxic respiratory failure:developed hypoxia at approximately 11pm on 11/24 requiring Bipap and was ultimately re-intubated at 0200 for acute respiratory failure. Bronchoscopy was completed 11/24 and unremarkable. SYLWIA completed 11/24 w normal RV and LVF w/o effusion or shunting. CTA chest w/o PE, heparin gtt stopped.  FiO2 down to 50%, PEEP down to 10. Goal to get PEEP down today  Acute kidney injury. Crt up to 2.01. Nephrology consult appreciated, avoid nephrotoxins, trend. Crt slightly today after CTA to 1.31. lasix gtt stopped and received albumin overnight for hypotension, changed to BID IVP, monitor UOP. Reassess CMP mid afternoon. Acute blood loss anemia: H&H 8.9/27.9 this am  Transaminitis. Acute Severe Hepatitis, GI consult appreciated, patient now with hepatomegaly and splenomegaly. Viral panel sent. Triple phase liver scan completed yesterday. No hepatic of splenic ischemia noted. + hepatic steatosis. Labs cont to trend down. Leukocytosis. WBC 20.6, up to 17.2 this am. febrile, Tmax 102.6, cont cooling blanket - under pt. BC, Sputum and urine cultures NTD. Seen by ID yesterday, Abx changed to Merrem and Vanco. Eraxis started by ICU, fungal cx pending. Procal down again today at 1.48  HTN. On ACEi, BB PTA; resume BB when appropriate. Hypotensive, starting Raymond today  HLD. holding statin. WALLY , not on CPAP. Was unable to tolerate due to anxiety; he has been working on this with Sleep Medicine. OP follow-up. DMII. On Toujeo at home. Repeat A1C 9.0- Insulin gtt per protocol. Diabetes management following. Hypothyroidism. On Synthroid PTA; continue. Repeat TSH 1.96. GERD. Continue PPI. Anxiety and depression. On Buspar, Zoloft; continue. Supportive care. Nutrition: holding feeds at this time per Surgery recs, see below  Ileus:Increase schedule bowel regimen. Remains on reglan per ICU. per Gen Surg \"Recommend continue NGT to suction and consider TPN per nutritionist recommendations. Would continue NGT decompression until daily outputs below 500 cc and then could attempt trickle feeds. \"     DVT ppx- Heparin subcu  Dispo- remain in ICU      Signed By: Andrew Sharma NP

## 2022-11-29 NOTE — PROGRESS NOTES
Admit Date: 11/15/2022    POD 7 Days Post-Op    Procedure:  Procedure(s):  SYLWIA BY DR Anayeli Waldron -  CORONARY ARTERY BYPASS GRAFT  X 1 WITH LEFT INTERNAL MAMMARY ARTERY GRAFTING - AORTIC VALVE REPLACEMENT WITH MEYER 25MM INSPIRIS RESILIA TISSUE VALVE AND REPAIR OF ASCENDING AORTIC ANEURYSM    Subjective:     Patient remains intubated, critically ill. Objective:     Blood pressure 129/61, pulse 91, temperature (!) 102.6 °F (39.2 °C), resp. rate 20, height 5' 9\" (1.753 m), weight 274 lb 7.6 oz (124.5 kg), SpO2 96 %.     Temp (24hrs), Av.3 °F (37.4 °C), Min:96.1 °F (35.6 °C), Max:102.6 °F (39.2 °C)      Physical Exam:  GENERAL: no distress, appears stated age, LUNG: clear to auscultation bilaterally, HEART: regular rate and rhythm, ABDOMEN: soft, ND, EXTREMITIES:  extremities normal, atraumatic, no cyanosis or edema    Labs:   Recent Results (from the past 24 hour(s))   BLOOD GAS, ARTERIAL    Collection Time: 22  8:48 AM   Result Value Ref Range    pH 7.39 7.35 - 7.45      PCO2 47 (H) 35 - 45 mmHg    PO2 84 80 - 100 mmHg    O2 SAT 96 92 - 97 %    BICARBONATE 27 (H) 22 - 26 mmol/L    BASE EXCESS 1.8 mmol/L    O2 METHOD VENT      FIO2 80 %    MODE ASSIST CONTROL      Tidal volume 450.0      SET RATE 20      PEEP/CPAP 12.0      Sample source ARTERIAL      SITE DRAWN FROM ARTERIAL LINE      MARSHA'S TEST NOT APPLICABLE     GLUCOSE, POC    Collection Time: 22  9:36 AM   Result Value Ref Range    Glucose (POC) 86 65 - 117 mg/dL    Performed by Unruly Hallman RN    GLUCOSTABILIZER    Collection Time: 22  9:36 AM   Result Value Ref Range    Glucose 86 mg/dL    Insulin order 2.1 units/hour    Insulin adminstered 2.1 units/hour    Multiplier 0.079     Low target 95 mg/dL    High target 130 mg/dL    D50 order 0.0 ml    D50 administered 0.00 ml    Minutes until next BG 60 min    Order initials MG     Administered initials MG    EKG, 12 LEAD, INITIAL    Collection Time: 22 10:03 AM   Result Value Ref Range    Ventricular Rate 79 BPM    Atrial Rate 79 BPM    P-R Interval 140 ms    QRS Duration 100 ms    Q-T Interval 380 ms    QTC Calculation (Bezet) 435 ms    Calculated P Axis 42 degrees    Calculated R Axis 38 degrees    Calculated T Axis -144 degrees    Diagnosis       Normal sinus rhythm  Poor R-wave Progression  Nonspecific T wave abnormality  When compared with ECG of 25-NOV-2022 13:47,  No significant change  Confirmed by Regina Ordaz ([id number]) on 11/28/2022 4:15:43 PM     GLUCOSE, POC    Collection Time: 11/28/22 10:40 AM   Result Value Ref Range    Glucose (POC) 104 65 - 117 mg/dL    Performed by Wing Carmen RN    GLUCOSTABILIZER    Collection Time: 11/28/22 10:40 AM   Result Value Ref Range    Glucose 104 mg/dL    Insulin order 3.5 units/hour    Insulin adminstered 3.5 units/hour    Multiplier 0.079     Low target 95 mg/dL    High target 130 mg/dL    D50 order 0.0 ml    D50 administered 0.00 ml    Minutes until next BG 60 min    Order initials sp     Administered initials sp    GLUCOSE, POC    Collection Time: 11/28/22 11:49 AM   Result Value Ref Range    Glucose (POC) 106 65 - 117 mg/dL    Performed by Wing Carmen RN    GLUCOSTABILIZER    Collection Time: 11/28/22 11:50 AM   Result Value Ref Range    Glucose 106 mg/dL    Insulin order 3.6 units/hour    Insulin adminstered 3.6 units/hour    Multiplier 0.079     Low target 95 mg/dL    High target 130 mg/dL    D50 order 0.0 ml    D50 administered 0.00 ml    Minutes until next BG 60 min    Order initials sp     Administered initials sp    TYPE & SCREEN    Collection Time: 11/28/22 12:58 PM   Result Value Ref Range    Crossmatch Expiration 12/01/2022,2359     ABO/Rh(D) A POSITIVE     Antibody screen NEG     Unit number [id number]     Blood component type RC LR     Unit division 00     Status of unit ALLOCATED     Crossmatch result Compatible    PROCALCITONIN    Collection Time: 11/28/22 12:58 PM   Result Value Ref Range    Procalcitonin 1.77 ng/mL   TSH 3RD GENERATION    Collection Time: 11/28/22 12:58 PM   Result Value Ref Range    TSH 6.70 (H) 0.36 - 3.74 uIU/mL   T4, FREE    Collection Time: 11/28/22 12:58 PM   Result Value Ref Range    T4, Free 0.9 0.8 - 1.5 NG/DL   GLUCOSE, POC    Collection Time: 11/28/22 12:59 PM   Result Value Ref Range    Glucose (POC) 124 (H) 65 - 117 mg/dL    Performed by Delaney Ramos RN    GLUCOSTABILIZER    Collection Time: 11/28/22 12:59 PM   Result Value Ref Range    Glucose 124 mg/dL    Insulin order 5.1 units/hour    Insulin adminstered 5.1 units/hour    Multiplier 0.079     Low target 95 mg/dL    High target 130 mg/dL    D50 order 0.0 ml    D50 administered 0.00 ml    Minutes until next BG 60 min    Order initials MG     Administered initials MG    GLUCOSE, POC    Collection Time: 11/28/22  2:04 PM   Result Value Ref Range    Glucose (POC) 113 65 - 117 mg/dL    Performed by Deb Spann RN    GLUCOSTABILIZER    Collection Time: 11/28/22  2:05 PM   Result Value Ref Range    Glucose 113 mg/dL    Insulin order 4.2 units/hour    Insulin adminstered 4.2 units/hour    Multiplier 0.079     Low target 95 mg/dL    High target 130 mg/dL    D50 order 0.0 ml    D50 administered 0.00 ml    Minutes until next BG 60 min    Order initials sp     Administered initials sp    GLUCOSE, POC    Collection Time: 11/28/22  3:15 PM   Result Value Ref Range    Glucose (POC) 131 (H) 65 - 117 mg/dL    Performed by Deb Spann RN    GLUCOSTABILIZER    Collection Time: 11/28/22  3:16 PM   Result Value Ref Range    Glucose 131 mg/dL    Insulin order 6.3 units/hour    Insulin adminstered 6.3 units/hour    Multiplier 0.089     Low target 95 mg/dL    High target 130 mg/dL    D50 order 0.0 ml    D50 administered 0.00 ml    Minutes until next BG 60 min    Order initials sp     Administered initials sp    METABOLIC PANEL, COMPREHENSIVE    Collection Time: 11/28/22  4:02 PM   Result Value Ref Range    Sodium 137 136 - 145 mmol/L    Potassium 3.7 3.5 - 5.1 mmol/L Chloride 102 97 - 108 mmol/L    CO2 29 21 - 32 mmol/L    Anion gap 6 5 - 15 mmol/L    Glucose 126 (H) 65 - 100 mg/dL    BUN 44 (H) 6 - 20 MG/DL    Creatinine 1.23 0.70 - 1.30 MG/DL    BUN/Creatinine ratio 36 (H) 12 - 20      eGFR >60 >60 ml/min/1.73m2    Calcium 8.4 (L) 8.5 - 10.1 MG/DL    Bilirubin, total 1.5 (H) 0.2 - 1.0 MG/DL    ALT (SGPT) 1,020 (H) 12 - 78 U/L    AST (SGOT) 707 (H) 15 - 37 U/L    Alk.  phosphatase 455 (H) 45 - 117 U/L    Protein, total 6.6 6.4 - 8.2 g/dL    Albumin 2.5 (L) 3.5 - 5.0 g/dL    Globulin 4.1 (H) 2.0 - 4.0 g/dL    A-G Ratio 0.6 (L) 1.1 - 2.2     GLUCOSE, POC    Collection Time: 11/28/22  4:14 PM   Result Value Ref Range    Glucose (POC) 118 (H) 65 - 117 mg/dL    Performed by Laila Ritchie RN    GLUCOSTABILIZER    Collection Time: 11/28/22  4:15 PM   Result Value Ref Range    Glucose 118 mg/dL    Insulin order 5.2 units/hour    Insulin adminstered 5.2 units/hour    Multiplier 0.089     Low target 95 mg/dL    High target 130 mg/dL    D50 order 0.0 ml    D50 administered 0.00 ml    Minutes until next BG 60 min    Order initials spp     Administered initials spp    GLUCOSE, POC    Collection Time: 11/28/22  5:22 PM   Result Value Ref Range    Glucose (POC) 115 65 - 117 mg/dL    Performed by Laila Ritchie RN    GLUCOSTABILIZER    Collection Time: 11/28/22  5:23 PM   Result Value Ref Range    Glucose 115 mg/dL    Insulin order 4.9 units/hour    Insulin adminstered 4.9 units/hour    Multiplier 0.089     Low target 95 mg/dL    High target 130 mg/dL    D50 order 0.0 ml    D50 administered 0.00 ml    Minutes until next BG 60 min    Order initials ER     Administered initials ER    GLUCOSE, POC    Collection Time: 11/28/22  6:27 PM   Result Value Ref Range    Glucose (POC) 100 65 - 117 mg/dL    Performed by Laila Ritchie RN    GLUCOSTABILIZER    Collection Time: 11/28/22  6:28 PM   Result Value Ref Range    Glucose 100 mg/dL    Insulin order 3.6 units/hour    Insulin adminstered 3.6 units/hour    Multiplier 0.089     Low target 95 mg/dL    High target 130 mg/dL    D50 order 0.0 ml    D50 administered 0.00 ml    Minutes until next BG 60 min    Order initials sp     Administered initials sp    GLUCOSE, POC    Collection Time: 11/28/22  7:44 PM   Result Value Ref Range    Glucose (POC) 112 65 - 117 mg/dL    Performed by [employer]    Collection Time: 11/28/22  7:45 PM   Result Value Ref Range    Glucose 112 mg/dL    Insulin order 4.6 units/hour    Insulin adminstered 4.6 units/hour    Multiplier 0.089     Low target 95 mg/dL    High target 130 mg/dL    D50 order 0.0 ml    D50 administered 0.00 ml    Minutes until next BG 60 min    Order initials mw     Administered initials mw    GLUCOSE, POC    Collection Time: 11/28/22  8:49 PM   Result Value Ref Range    Glucose (POC) 106 65 - 117 mg/dL    Performed by [employer]    Collection Time: 11/28/22  8:49 PM   Result Value Ref Range    Glucose 106 mg/dL    Insulin order 4.1 units/hour    Insulin adminstered 4.1 units/hour    Multiplier 0.089     Low target 95 mg/dL    High target 130 mg/dL    D50 order 0.0 ml    D50 administered 0.00 ml    Minutes until next BG 60 min    Order initials mw     Administered initials mw    GLUCOSE, POC    Collection Time: 11/28/22  9:58 PM   Result Value Ref Range    Glucose (POC) 118 (H) 65 - 117 mg/dL    Performed by [employer]    Collection Time: 11/28/22  9:59 PM   Result Value Ref Range    Glucose 118 mg/dL    Insulin order 5.2 units/hour    Insulin adminstered 5.2 units/hour    Multiplier 0.089     Low target 95 mg/dL    High target 130 mg/dL    D50 order 0.0 ml    D50 administered 0.00 ml    Minutes until next  min    Order initials mw     Administered initials mw    GLUCOSE, POC    Collection Time: 11/28/22 11:57 PM   Result Value Ref Range    Glucose (POC) 99 65 - 117 mg/dL    Performed by [employer] Collection Time: 11/28/22 11:57 PM   Result Value Ref Range    Glucose 99 mg/dL    Insulin order 3.5 units/hour    Insulin adminstered 3.5 units/hour    Multiplier 0.089     Low target 95 mg/dL    High target 130 mg/dL    D50 order 0.0 ml    D50 administered 0.00 ml    Minutes until next  min    Order initials mw     Administered initials mw    GLUCOSE, POC    Collection Time: 11/29/22  1:59 AM   Result Value Ref Range    Glucose (POC) 94 65 - 117 mg/dL    Performed by Farzana Mcadams    Collection Time: 11/29/22  2:00 AM   Result Value Ref Range    Glucose 94 mg/dL    Insulin order 2.4 units/hour    Insulin adminstered 2.4 units/hour    Multiplier 0.071     Low target 95 mg/dL    High target 130 mg/dL    D50 order 0.0 ml    D50 administered 0.00 ml    Minutes until next BG 60 min    Order initials mw     Administered initials mw    GLUCOSE, POC    Collection Time: 11/29/22  3:12 AM   Result Value Ref Range    Glucose (POC) 91 65 - 117 mg/dL    Performed by Concha Madrigal RN BSN    GLUCOSTABILIZER    Collection Time: 11/29/22  3:13 AM   Result Value Ref Range    Glucose 91 mg/dL    Insulin order 1.8 units/hour    Insulin adminstered 1.8 units/hour    Multiplier 0.057     Low target 95 mg/dL    High target 130 mg/dL    D50 order 0.0 ml    D50 administered 0.00 ml    Minutes until next BG 60 min    Order initials tf     Administered initials tf    PROCALCITONIN    Collection Time: 11/29/22  3:45 AM   Result Value Ref Range    Procalcitonin 1.48 ng/mL   PROTHROMBIN TIME + INR    Collection Time: 11/29/22  3:45 AM   Result Value Ref Range    INR 1.3 (H) 0.9 - 1.1      Prothrombin time 12.9 (H) 9.0 - 11.1 sec   VANCOMYCIN, TROUGH    Collection Time: 11/29/22  3:45 AM   Result Value Ref Range    Vancomycin,trough 19.1 (H) 5.0 - 10.0 ug/mL    Reported dose date NOT PROVIDED      Reported dose time: NOT PROVIDED      Reported dose: NOT PROVIDED UNITS   CBC WITH AUTOMATED DIFF    Collection Time: 11/29/22  3:45 AM   Result Value Ref Range    WBC PLEASE DISREGARD RESULTS 4.1 - 11.1 K/uL    RBC PLEASE DISREGARD RESULTS 4.10 - 5.70 M/uL    HGB PLEASE DISREGARD RESULTS 12.1 - 17.0 g/dL    HCT PLEASE DISREGARD RESULTS 36.6 - 50.3 %    MCV Cannot be calculated 80.0 - 99.0 FL    MCH Cannot be calculated 26.0 - 34.0 PG    MCHC Cannot be calculated 30.0 - 36.5 g/dL    RDW PLEASE DISREGARD RESULTS 11.5 - 14.5 %    PLATELET PLEASE DISREGARD RESULTS 150 - 400 K/uL    MPV PLEASE DISREGARD RESULTS 8.9 - 12.9 FL    NRBC PLEASE DISREGARD RESULTS 0  WBC    ABSOLUTE NRBC PLEASE DISREGARD RESULTS 0.00 - 0.01 K/uL    NEUTROPHILS PLEASE DISREGARD RESULTS 32 - 75 %    LYMPHOCYTES PLEASE DISREGARD RESULTS 12 - 49 %    MONOCYTES PLEASE DISREGARD RESULTS 5 - 13 %    EOSINOPHILS PLEASE DISREGARD RESULTS 0 - 7 %    BASOPHILS PLEASE DISREGARD RESULTS 0 - 1 %    IMMATURE GRANULOCYTES PLEASE DISREGARD RESULTS 0.0 - 0.5 %    ABS. NEUTROPHILS PLEASE DISREGARD RESULTS 1.8 - 8.0 K/UL    ABS. LYMPHOCYTES PLEASE DISREGARD RESULTS 0.8 - 3.5 K/UL    ABS. MONOCYTES PLEASE DISREGARD RESULTS 0.0 - 1.0 K/UL    ABS. EOSINOPHILS PLEASE DISREGARD RESULTS 0.0 - 0.4 K/UL    ABS. BASOPHILS PLEASE DISREGARD RESULTS 0.0 - 0.1 K/UL    ABS. IMM. GRANS. PLEASE DISREGARD RESULTS 0.00 - 0.04 K/UL    DF PLEASE DISREGARD RESULTS     METABOLIC PANEL, COMPREHENSIVE    Collection Time: 11/29/22  3:45 AM   Result Value Ref Range    Sodium 139 136 - 145 mmol/L    Potassium 4.5 3.5 - 5.1 mmol/L    Chloride 104 97 - 108 mmol/L    CO2 29 21 - 32 mmol/L    Anion gap 6 5 - 15 mmol/L    Glucose 129 (H) 65 - 100 mg/dL    BUN 43 (H) 6 - 20 MG/DL    Creatinine 1.31 (H) 0.70 - 1.30 MG/DL    BUN/Creatinine ratio 33 (H) 12 - 20      eGFR >60 >60 ml/min/1.73m2    Calcium 8.4 (L) 8.5 - 10.1 MG/DL    Bilirubin, total 1.3 (H) 0.2 - 1.0 MG/DL    ALT (SGPT) 843 (H) 12 - 78 U/L    AST (SGOT) 473 (H) 15 - 37 U/L    Alk.  phosphatase 461 (H) 45 - 117 U/L    Protein, total 6.4 6.4 - 8.2 g/dL    Albumin 2.7 (L) 3.5 - 5.0 g/dL    Globulin 3.7 2.0 - 4.0 g/dL    A-G Ratio 0.7 (L) 1.1 - 2.2     BILIRUBIN, DIRECT    Collection Time: 11/29/22  3:45 AM   Result Value Ref Range    Bilirubin, direct 0.5 (H) 0.0 - 0.2 MG/DL   MAGNESIUM    Collection Time: 11/29/22  3:45 AM   Result Value Ref Range    Magnesium 2.3 1.6 - 2.4 mg/dL   GLUCOSE, POC    Collection Time: 11/29/22  4:21 AM   Result Value Ref Range    Glucose (POC) 127 (H) 65 - 117 mg/dL    Performed by Cindy Padilla    Collection Time: 11/29/22  4:21 AM   Result Value Ref Range    Glucose 127 mg/dL    Insulin order 3.8 units/hour    Insulin adminstered 3.8 units/hour    Multiplier 0.057     Low target 95 mg/dL    High target 130 mg/dL    D50 order 0.0 ml    D50 administered 0.00 ml    Minutes until next BG 60 min    Order initials mw     Administered initials mw    CBC WITH AUTOMATED DIFF    Collection Time: 11/29/22  4:51 AM   Result Value Ref Range    WBC 17.2 (H) 4.1 - 11.1 K/uL    RBC 3.28 (L) 4.10 - 5.70 M/uL    HGB 8.9 (L) 12.1 - 17.0 g/dL    HCT 27.9 (L) 36.6 - 50.3 %    MCV 85.1 80.0 - 99.0 FL    MCH 27.1 26.0 - 34.0 PG    MCHC 31.9 30.0 - 36.5 g/dL    RDW 15.7 (H) 11.5 - 14.5 %    PLATELET 089 407 - 427 K/uL    MPV 9.6 8.9 - 12.9 FL    NRBC 0.6 (H) 0  WBC    ABSOLUTE NRBC 0.10 (H) 0.00 - 0.01 K/uL    NEUTROPHILS 77 (H) 32 - 75 %    BAND NEUTROPHILS 1 %    LYMPHOCYTES 14 12 - 49 %    MONOCYTES 6 5 - 13 %    EOSINOPHILS 2 0 - 7 %    BASOPHILS 0 0 - 1 %    IMMATURE GRANULOCYTES 0 0.0 - 0.5 %    ABS. NEUTROPHILS 13.5 (H) 1.8 - 8.0 K/UL    ABS. LYMPHOCYTES 2.4 0.8 - 3.5 K/UL    ABS. MONOCYTES 1.0 0.0 - 1.0 K/UL    ABS. EOSINOPHILS 0.3 0.0 - 0.4 K/UL    ABS. BASOPHILS 0.0 0.0 - 0.1 K/UL    ABS. IMM. GRANS. 0.0 0.00 - 0.04 K/UL    DF MANUAL      RBC COMMENTS ANISOCYTOSIS  1+       RBC, ALLOCATE    Collection Time: 11/29/22  5:00 AM   Result Value Ref Range    HISTORY CHECKED?  Historical check performed    GLUCOSE, POC    Collection Time: 11/29/22  5:27 AM   Result Value Ref Range    Glucose (POC) 123 (H) 65 - 117 mg/dL    Performed by Tristen Began    Collection Time: 11/29/22  5:27 AM   Result Value Ref Range    Glucose 123 mg/dL    Insulin order 3.6 units/hour    Insulin adminstered 3.6 units/hour    Multiplier 0.057     Low target 95 mg/dL    High target 130 mg/dL    D50 order 0.0 ml    D50 administered 0.00 ml    Minutes until next BG 60 min    Order initials lisa     Administered initials lisa    GLUCOSE, POC    Collection Time: 11/29/22  6:31 AM   Result Value Ref Range    Glucose (POC) 121 (H) 65 - 117 mg/dL    Performed by Tristen Began    Collection Time: 11/29/22  6:31 AM   Result Value Ref Range    Glucose 121 mg/dL    Insulin order 3.5 units/hour    Insulin adminstered 3.5 units/hour    Multiplier 0.057     Low target 95 mg/dL    High target 130 mg/dL    D50 order 0.0 ml    D50 administered 0.00 ml    Minutes until next BG 60 min    Order initials lisa     Administered initials lisa    GLUCOSE, POC    Collection Time: 11/29/22  7:44 AM   Result Value Ref Range    Glucose (POC) 133 (H) 65 - 117 mg/dL    Performed by Tristen Began    Collection Time: 11/29/22  7:44 AM   Result Value Ref Range    Glucose 133 mg/dL    Insulin order 4.9 units/hour    Insulin adminstered 4.9 units/hour    Multiplier 0.067     Low target 95 mg/dL    High target 130 mg/dL    D50 order 0.0 ml    D50 administered 0.00 ml    Minutes until next BG 60 min    Order initials mw     Administered initials lisa    BLOOD GAS, ARTERIAL    Collection Time: 11/29/22  8:16 AM   Result Value Ref Range    pH 7.43 7.35 - 7.45      PCO2 40 35 - 45 mmHg    PO2 86 80 - 100 mmHg    O2 SAT 97 92 - 97 %    BICARBONATE 26 22 - 26 mmol/L    BASE EXCESS 1.3 mmol/L    O2 METHOD VENT      FIO2 50 %    Tidal volume 450.0      SET RATE 20      PEEP/CPAP 10.0      Sample source ARTERIAL      SITE DRAWN FROM ARTERIAL LINE      MARSHA'S TEST NOT APPLICABLE         Data Review images and reports reviewed    Assessment:     Active Problems:    Acute non-Q wave non-ST elevation myocardial infarction (NSTEMI) ([address] (11/15/2022)      Aortic stenosis (11/22/2022)      CAD (coronary artery disease) (11/22/2022)      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)      Overview: On pump CORONARY ARTERY BYPASS GRAFT  X 1 WITH LEFT INTERNAL MAMMARY       ARTERY to LAD      AORTIC VALVE REPLACEMENT WITH MEYER 25MM INSPIRIS RESILIA TISSUE VALVE       REPAIR OF ASCENDING AORTIC ANEURYSM with 26mm hemashield graft      S/P AVR (aortic valve replacement) and aortoplasty (11/22/2022)      Overview: On pump CORONARY ARTERY BYPASS GRAFT  X 1 WITH LEFT INTERNAL MAMMARY       ARTERY to LAD      AORTIC VALVE REPLACEMENT WITH MEYER 25MM INSPIRIS RESILIA TISSUE VALVE       REPAIR OF ASCENDING AORTIC ANEURYSM with 26mm hemashield graft        Plan/Recommendations/Medical Decision Making:     CT unremarkable postsurgical findings  Still with significant NG o/p  Recommend continue NGT to suction and consider TPN per nutritionist recommendations. Would continue NGT decompression until daily outputs below 500 cc and then could attempt trickle feeds. Nothing to offer from general surgery standpoint, will sign off.     William Oseguera MD  [facility]

## 2022-11-29 NOTE — PROGRESS NOTES
Critical Care Progress Note  Miguel Angel Parker MD          Date of Service:  2022  NAME:  Ghazala Alejandro  :  1980  MRN:  [id number]      Subjective/Hospital course:      35 y/o male POD 1 from CABG x1 an AV valve replacement. He arrived intubated and on pressors. Extubated  on POD 0 @ 19:00. Noted to have SERGE with creatinine rise 2. Possibly secondary to hypotension in OR. Night of  was worked up for hypoxemia. This morning he tested positive for Covid. Case discussed with cT surgery. Most likely diagnosis is PE most likely secondary to hypercoaguable state from Covid. Pt had no respiratory symptoms preop or post op suggestive of URI      - Overnight developed hypoxemia. Pt had bronch and SYLWIA neither of which explained cause for hypoxemia. He required re-intubation and was put on veletri.  - intermittent desaturation events.   CXR this a.m. with pulmonary edema   - fio2 improved   : fio2 and PEEP requirement has increased, still requiring epi at 2 mcg/min   : remains on epinephrine drip, sedated, fio2 requirement decreased to 80%'LFT's are trending   Down   : remains on epi drip, diueresed well with lasix drip,fio2 decreased to 50% and peep to 6        Assessment/Plan:     PULMONARY:  Acute hypoxic resopiratory failure   Covid rapid +, PCR negative  D dimer / LE doppler negative - unlikely PE  Pulmonary edema/ pleural effusions  Ct chest : no PE  Plan  - stop heparin - discussed with CTS  - Vent settings established, reviewed and/or adjusted as per orders  - Continue nebulized bronchodilators and steroids  - change to lasix 40 mg Q 12 hrs    CARDIOVASCULAR/HEMODYNAMIC:  Continue epi drip         Current vasopressors: Epi 3 mcg/min    Plan    - ICU hemodynamic/cardiac monitoring  - MAP goal > 65 mmHg        RENAL:  SERGE    Plan  - Monitor I/Os and Uo  - Monitor renal function panel intermittently  - Correct electrolytes as needed  - Avoid nephrotoxic meds      GI/NUTRITION:  CT abdomen : normal liver vasculature  Hepatic steatosis : suspect cholestasis as the cause for the elevated LFT's  Continue reglan  Laxatives to move bowels  Trickle feeds when output less than 500    INFECTIOUS DISEASE  Covid ruled out with 2 negative PCR  Intermittent fevers  Cultures pending   Pro rosy is only slightly elevated    Micro:    Plan  - Continue broad spectrum abx   Add eraxis   ID consult  ? Non infectious     NEURO  Sedation for vent synchrony    ENDO : TSH is elevated  Increase synthroid to 150 mcg once a day    Plan    - Daily SAT when meets ICU criteria  - ABCDEF mobility bundle  - PT/OT involvement when appropriate          Care Plan discussed with: attending, CTS NP and bedside nurse, GI , radiology    I personally spent 90 minutes of critical care time. This is time spent at this critically ill patient's bedside actively involved in patient care as well as the coordination of care and discussions with the patient's family. This does not include any procedural time which has been billed separately. Review of Systems:   ROS   Intubated     Vital Signs:   Patient Vitals for the past 4 hrs:   Temp Pulse Resp SpO2   11/29/22 1400 (!) 102 °F (38.9 °C) 91 20 90 %   11/29/22 1300 (!) 102.2 °F (39 °C) 86 20 94 %   11/29/22 1200 (!) 102.2 °F (39 °C) 94 20 94 %   11/29/22 1104 -- 89 20 95 %          Intake/Output Summary (Last 24 hours) at 11/29/2022 1502  Last data filed at 11/29/2022 1400  Gross per 24 hour   Intake 2655.54 ml   Output 4840 ml   Net -2184.46 ml          Physical Examination:    Young, well built  HEENT: normal  Heart: s1,s2  Lungs: clear  Abd: distended, no bowel sounds  Ext: no edema  Cns: sedated     Labs and Imaging:   Reviewed.       Medications:     Current Facility-Administered Medications   Medication Dose Route Frequency    0.9% sodium chloride infusion 250 mL  250 mL IntraVENous PRN    furosemide (LASIX) injection 40 mg  40 mg IntraVENous BID    PHENYLephrine (TUYET-SYNEPHRINE) 30 mg in 0.9% sodium chloride 250 mL infusion   mcg/min IntraVENous TITRATE    bisacodyL (DULCOLAX) suppository 10 mg  10 mg Rectal DAILY    senna-docusate (PERICOLACE) 8.6-50 mg per tablet 2 Tablet  2 Tablet Oral DAILY    [START ON 11/30/2022] levothyroxine (SYNTHROID) tablet 150 mcg  150 mcg Oral 6am    balsam peru-castor oiL (VENELEX) ointment   Topical BID    anidulafungin (ERAXIS) 100 mg in 0.9% sodium chloride 130 mL IVPB  100 mg IntraVENous Q24H    heparin (porcine) injection 5,000 Units  5,000 Units SubCUTAneous Q8H    meropenem (MERREM) 1 g in 0.9% sodium chloride (MBP/ADV) 50 mL MBP  1 g IntraVENous Q8H    vancomycin (VANCOCIN) 1250 mg in  ml infusion  1,250 mg IntraVENous Q12H    metoclopramide HCl (REGLAN) injection 5 mg  5 mg IntraVENous Q6H    pantoprazole (PROTONIX) 40 mg in 0.9% sodium chloride 10 mL injection  40 mg IntraVENous DAILY    nitroGLYcerin (Tridil) 200 mcg/ml infusion  0-200 mcg/min IntraVENous TITRATE    dexmedeTOMidine in 0.9 % NaCl (PRECEDEX) 400 mcg/100 mL (4 mcg/mL) infusion soln  0.1-1.5 mcg/kg/hr IntraVENous TITRATE    fentaNYL (PF) 1,500 mcg/30 mL (50 mcg/mL) infusion  0-200 mcg/hr IntraVENous TITRATE    propofol (DIPRIVAN) 10 mg/mL infusion  0-50 mcg/kg/min IntraVENous TITRATE    0.9% sodium chloride infusion  10 mL/hr IntraVENous CONTINUOUS    HYDROmorphone (DILAUDID) injection 1 mg  1 mg IntraVENous Q4H PRN    HYDROmorphone (DILAUDID) injection 0.5 mg  0.5 mg IntraVENous Q4H PRN    niCARdipine (CARDENE) 25 mg in 0.9% sodium chloride 250 mL ([id number])  5-15 mg/hr IntraVENous TITRATE    sodium chloride (NS) flush 5-40 mL  5-40 mL IntraVENous Q8H    sodium chloride (NS) flush 5-40 mL  5-40 mL IntraVENous PRN    bacitracin 500 unit/gram packet 1 Packet  1 Packet Topical PRN    0.45% sodium chloride infusion  10 mL/hr IntraVENous CONTINUOUS    naloxone (NARCAN) injection 0.4 mg  0.4 mg IntraVENous PRN    ondansetron [facility]) injection 4 mg  4 mg IntraVENous Q4H PRN    albuterol (PROVENTIL VENTOLIN) nebulizer solution 2.5 mg  2.5 mg Nebulization Q4H PRN    aspirin chewable tablet 81 mg  81 mg Oral DAILY    midazolam (VERSED) injection 1 mg  1 mg IntraVENous Q1H PRN    chlorhexidine (PERIDEX) 0.12 % mouthwash 10 mL  10 mL Oral BID    calcium chloride 1 g in 0.9% sodium chloride 250 mL IVPB  1 g IntraVENous PRN    polyethylene glycol (MIRALAX) packet 17 g  17 g Oral DAILY    ELECTROLYTE REPLACEMENT NOTE: Nurse to review Serum Potassium and Magnesuim levels and Initiate Electrolyte Replacement Protocol as needed  1 Each Other PRN    magnesium sulfate 1 g/100 ml IVPB (premix or compounded)  1 g IntraVENous PRN    glucose chewable tablet 16 g  4 Tablet Oral PRN    glucagon (GLUCAGEN) injection 1 mg  1 mg IntraMUSCular PRN    insulin lispro (HUMALOG) injection   SubCUTAneous AC&HS    insulin glargine (LANTUS) injection 1-50 Units  1-50 Units SubCUTAneous ONCE OVER 24 HOURS    lactated ringers bolus infusion 250 mL  250 mL IntraVENous Q1H PRN    dextrose 10 % infusion 0-250 mL  0-250 mL IntraVENous PRN    melatonin tablet 3-6 mg  3-6 mg Oral QHS PRN    EPINEPHrine (ADRENALIN) 5 mg in 0.9% sodium chloride 250 mL infusion  0-10 mcg/min IntraVENous TITRATE    lidocaine 4 % patch 2 Patch  2 Patch TransDERmal Q24H    insulin regular (NOVOLIN R, HUMULIN R) 100 Units in 0.9% sodium chloride 100 mL infusion  1-25 Units/hr IntraVENous TITRATE    LORazepam (ATIVAN) tablet 1 mg  1 mg Oral Q8H PRN    sertraline (ZOLOFT) tablet 100 mg  100 mg Oral DAILY    busPIRone (BUSPAR) tablet 15 mg  15 mg Oral TID     ______________________________________________________________________  EXPECTED LENGTH OF STAY: 1d 19h  ACTUAL LENGTH OF STAY:          Lovely Verde6, MD   Pulmonary/CCM  [phone]

## 2022-11-29 NOTE — PROGRESS NOTES
Cross Cover ID remote  chart check   Persistently febrile   LFT better   On empiric broad coverage   Vancomycin, meropenem eraxis   Could possibly entertain doxycyline for atypical coverage but his fevers, lft elevation worsened after admission and doubt tick born issues/atypical infection as leading cause of his fevers/LFT elevation   Inflammatory/congestion likely also palying a role in his fevers/labs abnormalities  Peripheral smear noted   Changed abx to meropenem yesterday -- would give it another 24-48 hours before making more changes to see if there is cause/effect  Antibiotic deescalation based on course   See initial consult for details from yesterday     Dawn Gallagher,   2:13 PM

## 2022-11-29 NOTE — ROUTINE PROCESS
1900-Bedside shift change report given to Ria Mitchell (oncoming nurse) by Edwina (offgoing nurse). Report included the following information SBAR, Kardex, and MAR. 2050- Patient assessed. See flowsheet. 2036- Reassessed. No changes. [phone]- Reassessed. No changes. 0271- The patient is requiring titration of his epinephrine due to blood pressure. His cardiac index is over 2.5. Discussed with [address] [place] [address], NP. Will give albumin X 1. Lasix held. 0348- BP still low with an index of 2.9. Discussed with [address] [place] [address], NP. Patient received another dose of albumin. Cooling blanket placed on the patient for hyperthermia. 1770- Patient assessed. No changes. 0630- Patient bathed. Linens changed. 0700- Report given to the oncoming shift.

## 2022-11-30 ENCOUNTER — APPOINTMENT (OUTPATIENT)
Dept: GENERAL RADIOLOGY | Age: 42
End: 2022-11-30
Attending: PHYSICIAN ASSISTANT
Payer: COMMERCIAL

## 2022-11-30 ENCOUNTER — APPOINTMENT (OUTPATIENT)
Dept: GENERAL RADIOLOGY | Age: 42
End: 2022-11-30
Attending: INTERNAL MEDICINE
Payer: COMMERCIAL

## 2022-11-30 LAB
ALBUMIN SERPL-MCNC: 2.4 G/DL (ref 3.5–5)
ALBUMIN/GLOB SERPL: 0.7 {RATIO} (ref 1.1–2.2)
ALP SERPL-CCNC: 388 U/L (ref 45–117)
ALT SERPL-CCNC: 659 U/L (ref 12–78)
ANION GAP SERPL CALC-SCNC: 6 MMOL/L (ref 5–15)
ANION GAP SERPL CALC-SCNC: 9 MMOL/L (ref 5–15)
AST SERPL-CCNC: 376 U/L (ref 15–37)
BASE EXCESS BLD CALC-SCNC: 1.4 MMOL/L
BASOPHILS # BLD: 0 K/UL (ref 0–0.1)
BASOPHILS NFR BLD: 0 % (ref 0–1)
BILIRUB DIRECT SERPL-MCNC: 0.3 MG/DL (ref 0–0.2)
BILIRUB SERPL-MCNC: 0.8 MG/DL (ref 0.2–1)
BUN SERPL-MCNC: 41 MG/DL (ref 6–20)
BUN SERPL-MCNC: 43 MG/DL (ref 6–20)
BUN/CREAT SERPL: 40 (ref 12–20)
BUN/CREAT SERPL: 41 (ref 12–20)
CA-I BLD-MCNC: 1.11 MMOL/L (ref 1.12–1.32)
CALCIUM SERPL-MCNC: 8.7 MG/DL (ref 8.5–10.1)
CALCIUM SERPL-MCNC: 8.8 MG/DL (ref 8.5–10.1)
CHLORIDE BLD-SCNC: 110 MMOL/L (ref 100–108)
CHLORIDE SERPL-SCNC: 109 MMOL/L (ref 97–108)
CHLORIDE SERPL-SCNC: 110 MMOL/L (ref 97–108)
CO2 BLD-SCNC: 25 MMOL/L (ref 19–24)
CO2 SERPL-SCNC: 27 MMOL/L (ref 21–32)
CO2 SERPL-SCNC: 29 MMOL/L (ref 21–32)
CREAT SERPL-MCNC: 1.02 MG/DL (ref 0.7–1.3)
CREAT SERPL-MCNC: 1.06 MG/DL (ref 0.7–1.3)
CREAT UR-MCNC: 1.1 MG/DL (ref 0.6–1.3)
DIFFERENTIAL METHOD BLD: ABNORMAL
EBV NA IGG SER-ACNC: 85.1 U/ML (ref 0–17.9)
EBV VCA IGG SER-ACNC: 219 U/ML (ref 0–17.9)
EBV VCA IGM SER-ACNC: <36 U/ML (ref 0–35.9)
EOSINOPHIL # BLD: 1.2 K/UL (ref 0–0.4)
EOSINOPHIL NFR BLD: 6 % (ref 0–7)
ERYTHROCYTE [DISTWIDTH] IN BLOOD BY AUTOMATED COUNT: 15.7 % (ref 11.5–14.5)
GLOBULIN SER CALC-MCNC: 3.6 G/DL (ref 2–4)
GLUCOSE BLD STRIP.AUTO-MCNC: 104 MG/DL (ref 65–117)
GLUCOSE BLD STRIP.AUTO-MCNC: 107 MG/DL (ref 65–117)
GLUCOSE BLD STRIP.AUTO-MCNC: 121 MG/DL (ref 65–117)
GLUCOSE BLD STRIP.AUTO-MCNC: 220 MG/DL (ref 65–117)
GLUCOSE BLD STRIP.AUTO-MCNC: 87 MG/DL (ref 74–106)
GLUCOSE BLD STRIP.AUTO-MCNC: 99 MG/DL (ref 65–117)
GLUCOSE BLD-MCNC: 102 MG/DL (ref 65–100)
GLUCOSE BLD-MCNC: 108 MG/DL (ref 65–100)
GLUCOSE BLD-MCNC: 183 MG/DL (ref 65–100)
GLUCOSE SERPL-MCNC: 102 MG/DL (ref 65–100)
GLUCOSE SERPL-MCNC: 105 MG/DL (ref 65–100)
HCO3 BLDA-SCNC: 26 MMOL/L
HCT VFR BLD AUTO: 28.9 % (ref 36.6–50.3)
HGB BLD-MCNC: 9 G/DL (ref 12.1–17)
HSV1 DNA SPEC QL NAA+PROBE: NEGATIVE
HSV2 DNA SPEC QL NAA+PROBE: NEGATIVE
IMM GRANULOCYTES # BLD AUTO: 0 K/UL (ref 0–0.04)
IMM GRANULOCYTES NFR BLD AUTO: 0 % (ref 0–0.5)
INR PPP: 1.2 (ref 0.9–1.1)
INTERPRETATION, 169995: ABNORMAL
LACTATE BLD-SCNC: 0.84 MMOL/L (ref 0.4–2)
LYMPHOCYTES # BLD: 1.4 K/UL (ref 0.8–3.5)
LYMPHOCYTES NFR BLD: 7 % (ref 12–49)
MAGNESIUM SERPL-MCNC: 2.2 MG/DL (ref 1.6–2.4)
MAGNESIUM SERPL-MCNC: 2.3 MG/DL (ref 1.6–2.4)
MCH RBC QN AUTO: 27 PG (ref 26–34)
MCHC RBC AUTO-ENTMCNC: 31.1 G/DL (ref 30–36.5)
MCV RBC AUTO: 86.8 FL (ref 80–99)
MONOCYTES # BLD: 0.8 K/UL (ref 0–1)
MONOCYTES NFR BLD: 4 % (ref 5–13)
NEUTS SEG # BLD: 17.2 K/UL (ref 1.8–8)
NEUTS SEG NFR BLD: 83 % (ref 32–75)
NRBC # BLD: 0.12 K/UL (ref 0–0.01)
NRBC BLD-RTO: 0.6 PER 100 WBC
PCO2 BLD: 37.3 MMHG (ref 35–45)
PH BLD: 7.44 [PH] (ref 7.35–7.45)
PHOSPHATE SERPL-MCNC: 3.3 MG/DL (ref 2.6–4.7)
PLATELET # BLD AUTO: 279 K/UL (ref 150–400)
PMV BLD AUTO: 10 FL (ref 8.9–12.9)
PO2 BLD: 75 MMHG (ref 80–100)
POTASSIUM BLD-SCNC: 3.1 MMOL/L (ref 3.5–5.5)
POTASSIUM SERPL-SCNC: 3.8 MMOL/L (ref 3.5–5.1)
POTASSIUM SERPL-SCNC: 3.8 MMOL/L (ref 3.5–5.1)
PROT SERPL-MCNC: 6 G/DL (ref 6.4–8.2)
PROTHROMBIN TIME: 12.4 SEC (ref 9–11.1)
RBC # BLD AUTO: 3.33 M/UL (ref 4.1–5.7)
RBC MORPH BLD: ABNORMAL
SERVICE CMNT-IMP: ABNORMAL
SERVICE CMNT-IMP: NORMAL
SODIUM BLD-SCNC: 147 MMOL/L (ref 136–145)
SODIUM SERPL-SCNC: 145 MMOL/L (ref 136–145)
SODIUM SERPL-SCNC: 145 MMOL/L (ref 136–145)
SPECIMEN SITE: ABNORMAL
SPECIMEN SOURCE: NORMAL
UFH PPP CHRO-ACNC: <0.1 IU/ML
WBC # BLD AUTO: 20.6 K/UL (ref 4.1–11.1)

## 2022-11-30 PROCEDURE — 74011250636 HC RX REV CODE- 250/636: Performed by: INTERNAL MEDICINE

## 2022-11-30 PROCEDURE — 99291 CRITICAL CARE FIRST HOUR: CPT | Performed by: THORACIC SURGERY (CARDIOTHORACIC VASCULAR SURGERY)

## 2022-11-30 PROCEDURE — 74018 RADEX ABDOMEN 1 VIEW: CPT

## 2022-11-30 PROCEDURE — 74011000250 HC RX REV CODE- 250: Performed by: NURSE PRACTITIONER

## 2022-11-30 PROCEDURE — 85520 HEPARIN ASSAY: CPT

## 2022-11-30 PROCEDURE — 74011000250 HC RX REV CODE- 250: Performed by: ANESTHESIOLOGY

## 2022-11-30 PROCEDURE — 74011000250 HC RX REV CODE- 250: Performed by: INTERNAL MEDICINE

## 2022-11-30 PROCEDURE — 74011250637 HC RX REV CODE- 250/637: Performed by: INTERNAL MEDICINE

## 2022-11-30 PROCEDURE — 74011250637 HC RX REV CODE- 250/637: Performed by: NURSE PRACTITIONER

## 2022-11-30 PROCEDURE — 82947 ASSAY GLUCOSE BLOOD QUANT: CPT

## 2022-11-30 PROCEDURE — 80076 HEPATIC FUNCTION PANEL: CPT

## 2022-11-30 PROCEDURE — 83735 ASSAY OF MAGNESIUM: CPT

## 2022-11-30 PROCEDURE — 74011250636 HC RX REV CODE- 250/636: Performed by: THORACIC SURGERY (CARDIOTHORACIC VASCULAR SURGERY)

## 2022-11-30 PROCEDURE — 99292 CRITICAL CARE ADDL 30 MIN: CPT | Performed by: THORACIC SURGERY (CARDIOTHORACIC VASCULAR SURGERY)

## 2022-11-30 PROCEDURE — 74011000258 HC RX REV CODE- 258: Performed by: NURSE PRACTITIONER

## 2022-11-30 PROCEDURE — 80048 BASIC METABOLIC PNL TOTAL CA: CPT

## 2022-11-30 PROCEDURE — 74011250637 HC RX REV CODE- 250/637: Performed by: PHYSICIAN ASSISTANT

## 2022-11-30 PROCEDURE — 74011636637 HC RX REV CODE- 636/637: Performed by: HOSPITALIST

## 2022-11-30 PROCEDURE — 71045 X-RAY EXAM CHEST 1 VIEW: CPT

## 2022-11-30 PROCEDURE — 99233 SBSQ HOSP IP/OBS HIGH 50: CPT | Performed by: CLINICAL NURSE SPECIALIST

## 2022-11-30 PROCEDURE — 74011250636 HC RX REV CODE- 250/636: Performed by: NURSE PRACTITIONER

## 2022-11-30 PROCEDURE — 84100 ASSAY OF PHOSPHORUS: CPT

## 2022-11-30 PROCEDURE — 82962 GLUCOSE BLOOD TEST: CPT

## 2022-11-30 PROCEDURE — 86003 ALLG SPEC IGE CRUDE XTRC EA: CPT

## 2022-11-30 PROCEDURE — 74011000258 HC RX REV CODE- 258: Performed by: INTERNAL MEDICINE

## 2022-11-30 PROCEDURE — C9113 INJ PANTOPRAZOLE SODIUM, VIA: HCPCS | Performed by: INTERNAL MEDICINE

## 2022-11-30 PROCEDURE — 36415 COLL VENOUS BLD VENIPUNCTURE: CPT

## 2022-11-30 PROCEDURE — 85025 COMPLETE CBC W/AUTO DIFF WBC: CPT

## 2022-11-30 PROCEDURE — 85610 PROTHROMBIN TIME: CPT

## 2022-11-30 PROCEDURE — 74011636637 HC RX REV CODE- 636/637: Performed by: NURSE PRACTITIONER

## 2022-11-30 PROCEDURE — 74011000258 HC RX REV CODE- 258: Performed by: THORACIC SURGERY (CARDIOTHORACIC VASCULAR SURGERY)

## 2022-11-30 PROCEDURE — 65610000006 HC RM INTENSIVE CARE

## 2022-11-30 PROCEDURE — 94003 VENT MGMT INPAT SUBQ DAY: CPT

## 2022-11-30 RX ORDER — MAGNESIUM SULFATE 100 %
4 CRYSTALS MISCELLANEOUS AS NEEDED
Status: DISCONTINUED | OUTPATIENT
Start: 2022-11-30 | End: 2022-12-09 | Stop reason: HOSPADM

## 2022-11-30 RX ORDER — INSULIN GLARGINE 100 [IU]/ML
50 INJECTION, SOLUTION SUBCUTANEOUS EVERY 12 HOURS
Status: DISCONTINUED | OUTPATIENT
Start: 2022-11-30 | End: 2022-12-01

## 2022-11-30 RX ORDER — POTASSIUM CHLORIDE 29.8 MG/ML
20 INJECTION INTRAVENOUS ONCE
Status: COMPLETED | OUTPATIENT
Start: 2022-11-30 | End: 2022-11-30

## 2022-11-30 RX ORDER — INSULIN LISPRO 100 [IU]/ML
INJECTION, SOLUTION INTRAVENOUS; SUBCUTANEOUS EVERY 6 HOURS
Status: DISCONTINUED | OUTPATIENT
Start: 2022-11-30 | End: 2022-12-05

## 2022-11-30 RX ORDER — DEXTROSE MONOHYDRATE 100 MG/ML
0-250 INJECTION, SOLUTION INTRAVENOUS AS NEEDED
Status: DISCONTINUED | OUTPATIENT
Start: 2022-11-30 | End: 2022-11-30 | Stop reason: SDUPTHER

## 2022-11-30 RX ADMIN — PROPOFOL 20 MCG/KG/MIN: 10 INJECTION, EMULSION INTRAVENOUS at 08:24

## 2022-11-30 RX ADMIN — MEROPENEM 1 G: 1 INJECTION, POWDER, FOR SOLUTION INTRAVENOUS at 10:04

## 2022-11-30 RX ADMIN — HEPARIN SODIUM 5000 UNITS: 5000 INJECTION INTRAVENOUS; SUBCUTANEOUS at 23:09

## 2022-11-30 RX ADMIN — HEPARIN SODIUM 5000 UNITS: 5000 INJECTION INTRAVENOUS; SUBCUTANEOUS at 08:25

## 2022-11-30 RX ADMIN — BUSPIRONE HYDROCHLORIDE 15 MG: 5 TABLET ORAL at 08:25

## 2022-11-30 RX ADMIN — Medication 1.4 MCG/KG/HR: at 00:38

## 2022-11-30 RX ADMIN — Medication 1.4 MCG/KG/HR: at 05:54

## 2022-11-30 RX ADMIN — HEPARIN SODIUM 5000 UNITS: 5000 INJECTION INTRAVENOUS; SUBCUTANEOUS at 15:28

## 2022-11-30 RX ADMIN — FENTANYL CITRATE 50 MCG/HR: 50 INJECTION INTRAVENOUS at 01:30

## 2022-11-30 RX ADMIN — BUSPIRONE HYDROCHLORIDE 15 MG: 5 TABLET ORAL at 15:28

## 2022-11-30 RX ADMIN — SERTRALINE 100 MG: 50 TABLET, FILM COATED ORAL at 08:25

## 2022-11-30 RX ADMIN — VANCOMYCIN HYDROCHLORIDE 1250 MG: 10 INJECTION, POWDER, LYOPHILIZED, FOR SOLUTION INTRAVENOUS at 03:35

## 2022-11-30 RX ADMIN — POTASSIUM CHLORIDE 20 MEQ: 29.8 INJECTION, SOLUTION INTRAVENOUS at 15:53

## 2022-11-30 RX ADMIN — SENNOSIDES AND DOCUSATE SODIUM 2 TABLET: 50; 8.6 TABLET ORAL at 08:25

## 2022-11-30 RX ADMIN — METOCLOPRAMIDE 5 MG: 5 INJECTION, SOLUTION INTRAMUSCULAR; INTRAVENOUS at 05:40

## 2022-11-30 RX ADMIN — Medication 1.4 MCG/KG/HR: at 17:52

## 2022-11-30 RX ADMIN — BISACODYL 10 MG: 10 SUPPOSITORY RECTAL at 08:24

## 2022-11-30 RX ADMIN — METOCLOPRAMIDE 5 MG: 5 INJECTION, SOLUTION INTRAMUSCULAR; INTRAVENOUS at 11:44

## 2022-11-30 RX ADMIN — SODIUM CHLORIDE, PRESERVATIVE FREE 10 ML: 5 INJECTION INTRAVENOUS at 05:41

## 2022-11-30 RX ADMIN — Medication 1.4 MCG/KG/HR: at 13:49

## 2022-11-30 RX ADMIN — SODIUM CHLORIDE 3.4 UNITS/HR: 9 INJECTION, SOLUTION INTRAVENOUS at 01:32

## 2022-11-30 RX ADMIN — MEROPENEM 1 G: 1 INJECTION, POWDER, FOR SOLUTION INTRAVENOUS at 17:07

## 2022-11-30 RX ADMIN — FUROSEMIDE 10 MG/HR: 10 INJECTION, SOLUTION INTRAMUSCULAR; INTRAVENOUS at 17:52

## 2022-11-30 RX ADMIN — CASTOR OIL AND BALSAM, PERU: 788; 87 OINTMENT TOPICAL at 09:09

## 2022-11-30 RX ADMIN — POLYETHYLENE GLYCOL 3350 17 G: 17 POWDER, FOR SOLUTION ORAL at 08:24

## 2022-11-30 RX ADMIN — Medication 1.4 MCG/KG/HR: at 16:06

## 2022-11-30 RX ADMIN — LEVOTHYROXINE SODIUM 150 MCG: 0.07 TABLET ORAL at 05:40

## 2022-11-30 RX ADMIN — Medication 1.4 MCG/KG/HR: at 03:18

## 2022-11-30 RX ADMIN — Medication 1.4 MCG/KG/HR: at 22:59

## 2022-11-30 RX ADMIN — BUSPIRONE HYDROCHLORIDE 15 MG: 5 TABLET ORAL at 21:31

## 2022-11-30 RX ADMIN — Medication 1.4 MCG/KG/HR: at 20:41

## 2022-11-30 RX ADMIN — SODIUM CHLORIDE 100 MG: 9 INJECTION, SOLUTION INTRAVENOUS at 10:56

## 2022-11-30 RX ADMIN — INSULIN GLARGINE 50 UNITS: 100 INJECTION, SOLUTION SUBCUTANEOUS at 23:08

## 2022-11-30 RX ADMIN — Medication 3 UNITS: at 17:23

## 2022-11-30 RX ADMIN — VANCOMYCIN HYDROCHLORIDE 1250 MG: 10 INJECTION, POWDER, LYOPHILIZED, FOR SOLUTION INTRAVENOUS at 15:53

## 2022-11-30 RX ADMIN — PROPOFOL 20 MCG/KG/MIN: 10 INJECTION, EMULSION INTRAVENOUS at 03:19

## 2022-11-30 RX ADMIN — ASPIRIN 81 MG CHEWABLE TABLET 81 MG: 81 TABLET CHEWABLE at 08:25

## 2022-11-30 RX ADMIN — SODIUM CHLORIDE 40 MG: 9 INJECTION, SOLUTION INTRAMUSCULAR; INTRAVENOUS; SUBCUTANEOUS at 08:24

## 2022-11-30 RX ADMIN — METOCLOPRAMIDE 5 MG: 5 INJECTION, SOLUTION INTRAMUSCULAR; INTRAVENOUS at 17:07

## 2022-11-30 RX ADMIN — Medication 1.4 MCG/KG/HR: at 08:14

## 2022-11-30 RX ADMIN — Medication 4 UNITS: at 23:08

## 2022-11-30 RX ADMIN — PROPOFOL 15 MCG/KG/MIN: 10 INJECTION, EMULSION INTRAVENOUS at 15:53

## 2022-11-30 RX ADMIN — MEROPENEM 1 G: 1 INJECTION, POWDER, FOR SOLUTION INTRAVENOUS at 01:33

## 2022-11-30 RX ADMIN — METOCLOPRAMIDE 5 MG: 5 INJECTION, SOLUTION INTRAMUSCULAR; INTRAVENOUS at 23:09

## 2022-11-30 RX ADMIN — INSULIN GLARGINE 50 UNITS: 100 INJECTION, SOLUTION SUBCUTANEOUS at 12:25

## 2022-11-30 RX ADMIN — CASTOR OIL AND BALSAM, PERU: 788; 87 OINTMENT TOPICAL at 21:39

## 2022-11-30 RX ADMIN — SODIUM CHLORIDE, PRESERVATIVE FREE 10 ML: 5 INJECTION INTRAVENOUS at 21:40

## 2022-11-30 RX ADMIN — SODIUM CHLORIDE, PRESERVATIVE FREE 10 ML: 5 INJECTION INTRAVENOUS at 15:28

## 2022-11-30 RX ADMIN — PHENYLEPHRINE HYDROCHLORIDE 45 MCG/MIN: 10 INJECTION INTRAVENOUS at 13:06

## 2022-11-30 RX ADMIN — Medication 1.4 MCG/KG/HR: at 10:55

## 2022-11-30 RX ADMIN — PROPOFOL 20 MCG/KG/MIN: 10 INJECTION, EMULSION INTRAVENOUS at 21:38

## 2022-11-30 NOTE — DIABETES MGMT
[facility] [facility]    CLINICAL NURSE SPECIALIST CONSULT     Initial Presentation   Ruby Brian is a 36 y.o. male admitted 11/15/22 after experiencing chest pain. Afebrile. Hypertensive. 02 sats 100%  LAB: WBC 8.7. Normal H&H. /AG 3. Normal kidney & liver parameters. NT pro-; troponin 714  CXR:  Mild interstitial pulmonary edema versus interstitial pneumonia. No   pneumothorax. Consider PA and lateral chest views when the patient can better   tolerate. HX:   Past Medical History:   Diagnosis Date    Anxiety and depression     anxiety, depression    Bleeding of eye, left     8/17/21 pt reports receiving injections in right eye for beeding    Chronic headaches 2007    COVID-19 12/20/2020    Diabetes type 1, uncontrolled     since 8years old    GERD (gastroesophageal reflux disease)     Hypercholesterolemia     Hypertension     Hypothyroidism     MI (myocardial infarction) ([facility] [address]     as of 8/17/21 pt reports 9 stents total    WALLY on CPAP       INITIAL DX:   Acute non-Q wave non-ST elevation myocardial infarction (NSTEMI) ([facility] [address] [I21.4]  Aortic stenosis [I35.0]  CAD (coronary artery disease) [I25.10]     Current Treatment     TX: 11/22/22 On pump CORONARY ARTERY BYPASS GRAFT  X 1 WITH LEFT INTERNAL MAMMARY ARTERY to LAD  AORTIC VALVE REPLACEMENT WITH MEYER 25MM INSPIRIS RESILIA TISSUE VALVE   REPAIR OF ASCENDING AORTIC ANEURYSM with 26mm hemashield graft    Consulted by Provider for advanced diabetes nursing assessment and care for:   [x] Transitioning off Jose Line   [x] Inpatient management strategy  [] Home management assessment  [] Survival skill education    Hospital Course   Clinical progress has been complicated by need for ICU level of care. 11/24/22 Overnight developed hypoxemia. Underwent bronch and SYLWIA, neither of which explained cause for hypoxemia. He required re-intubation and was put on veletri. 11/25/22 Intermittent desaturation events. CXR this a.m. with pulmonary edema  11/27/22 Concern for hepatic ischemia/infarction with progressive trnasaminitis. GI consulted determines severe hepatitis  11/29/22 Sedated on Precedex & Propofol. On C vent support Fi02 50%/Peep 8. Trying to transition off epi to baldev infusions. Will restart Tfs. NG 2 suction at the moment. Plan on diuresing  11/30/22 Follows commands with cough & gag+. On AC vent support Fi02 50%/Peep 8. On Fentanyl infusion; Propofol being weaned. BP managed with epi & baldev infusions. NG to suction & drawing 500cc over past day. KKUB revealed prominent fecal status. Receiving Miralax & Senna; no BM for 14 days. 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 Max daily dose of 150 units.            Diabetes self-management practices:   Eating pattern - Eats 3 meals on most days and snacks in the evening   [x] Not eating a carbohydrate-controlled mealplan  Physical activity pattern   [x] Not employing a physical activity program to control BG  Monitoring pattern   [x] Testing BGs [x] Breakfast 300s  [x] Lunch  100-200s  [x] Dinner  100-200s  Taking medications pattern  [x] Consistent administration  [x] Affordable  Overall evaluation:    [x] Not achieving A1c target with drug therapy & self-care practices    Subjective   Intubated     Objective   Physical exam  General Obese male who is quite ill  Neuro  Does follow commands   Vital Signs Visit Vitals  BP (!) 109/59   Pulse 92   Temp (!) 101.1 °F (38.4 °C)   Resp 20   Ht 5' 9\" (1.753 m)   Wt 120.6 kg (265 lb 14 oz)   SpO2 93%   BMI 39.26 kg/m²     Laboratory  Recent Labs     11/30/22  0433 11/29/22  1254 11/29/22  0451 11/29/22  0345   * 115*  --  129*   AGAP 9 6  --  6   WBC 20.6*  --  17.2* PLEASE DISREGARD RESULTS   CREA 1.02 1.41*  --  1.31*   * 482*  --  473*   * 770*  --  843*     Factors impacting BG management  Factor Dose Comments   Nutrition:  NPO    Marked fecal statis   Drugs:  Vasopressor load  Atypical antipsychotics   Epi infusion  Buspar 3XB. Zoloft D   Affects insulin delivery     Pain Dilaudid infusion    Infection Merrem Q8 hrs  Eraxis Q24 hrs Fever.  WBC elevated   Other:   Kidney function  Liver function  NT pro-BNP   SERGE  AST/ALT elevated but trending down  2833 (11/24/22)      Blood glucose pattern      Significant diabetes-related events over the past 24-72 hours  11/15/22 Admission   Total insulin requirements without achieving target Bgs until 11/20/22:    11/21/22 Started on Ludwin Johnsonmer  11/23/22 Using 5-10 units/hr of insulin this morning  11/28/22 Using 2-6 units/hr of insulin  11/29/22 Continues to use 3-6 units/hr of insulin  11/30/22 Using 3.5 units/hr     Assessment and Plan   Nursing Diagnosis Risk for unstable blood glucose pattern   Nursing Intervention Domain 6938 Decision-making Support   Nursing Interventions Examined current inpatient diabetes/blood glucose control   Explored factors facilitating and impeding inpatient management  Explored corrective strategies with patient and responsible inpatient provider   Informed patient of rational for insulin strategy while hospitalized     Evaluation   This 36year old  male with known CAD was admitted with chest pain; he underwent CABG & AVR. Patient has known Type 1 diabetes with poor control going into this surgery. Patient required 260 units/D prior to the surgery (during this admission) without consistently achieving BG targets. Has been on Glucostabilizer since surgery 11/22/22; was using 3-6 units/hour. Very ill. Remains on vent & pressor support. Abdominal distention is related to feces with bowel regimen in place. Desire is to transition off Nora Friona; has been using  units of total insulin over the past 3 days. Recommendations     [x] Transition off Glucostabilizer with Lantus insulin 50 units twice daily    Billing Code(s)   [x] 12329 IP subsequent hospital care - 35 minutes     Before making these care recommendations, I personally reviewed the hospitalization record, including notes, laboratory & diagnostic data and current medications, and examined the patient at the bedside (circumstances permitting) before making care recommendations. More than fifty (50) percent of the time was spent in patient counseling and/or care coordination.   Total minutes: Neli Schultz, CNS  Diabetes Clinical Nurse Specialist  [facility]  Access via [address] [place] [address]

## 2022-11-30 NOTE — PROGRESS NOTES
0700  Desat after suction to 88% > RT & NP updated > PEEP up to 10 FiO2 to 60% from 50% the titrated down to 50 % at am, sat at 94%, Raymond titrated off, has some frequent PACs thet drop his MAP to less than 65 mmHg > EPi up to 3 ritu/min then back to 2 ritu/min, active bowel sound, no BM, good UOP, patient follow command and Nod appropriately. Bedside and Verbal shift change report given to KG Sales (oncoming nurse) by Ankur Yi RN (offgoing nurse). Report included the following information SBAR, Kardex, Intake/Output, MAR, Accordion, Recent Results, Med Rec Status and Cardiac Rhythm SR.     Problem: Patient Education: Go to Patient Education Activity  Goal: Patient/Family Education  Outcome: Progressing Towards Goal     Problem: Non-Violent Restraints  Goal: Removal from restraints as soon as assessed to be safe  Outcome: Progressing Towards Goal  Goal: No harm/injury to patient while restraints in use  Outcome: Progressing Towards Goal  Goal: Patient's dignity will be maintained  Outcome: Progressing Towards Goal  Goal: Patient Interventions  Outcome: Progressing Towards Goal

## 2022-11-30 NOTE — PROGRESS NOTES
Gastroenterology Daily Progress Note   LOLLY Swanson for Dr. Gwen Pabon)   [facility]    Admit Date: 11/15/2022     Follow up of hepatitis    Subjective:       Sedated and on vent  On tuyet and epi  Tm 99.7  ID following  On eraxis, vancomycin and meopenam    EBV, HSV, Hep D pending  Acute hepatitis panel and hep B quant neg  CMV IgG and IgM neg  WENDY, AMA, ASMA neg  IgG panel not elevatd    INR stable at 1.2  T bili improved to 0.8  ALT improved to 659  AST improved to 376  Alk phos stable at 388    Current Facility-Administered Medications   Medication Dose Route Frequency    [START ON 12/1/2022] Vancomycin level with AM labs 12/1  1 Each Other ONCE    0.9% sodium chloride infusion 250 mL  250 mL IntraVENous PRN    PHENYLephrine (TUYET-SYNEPHRINE) 30 mg in 0.9% sodium chloride 250 mL infusion   mcg/min IntraVENous TITRATE    bisacodyL (DULCOLAX) suppository 10 mg  10 mg Rectal DAILY    senna-docusate (PERICOLACE) 8.6-50 mg per tablet 2 Tablet  2 Tablet Oral DAILY    levothyroxine (SYNTHROID) tablet 150 mcg  150 mcg Oral 6am    furosemide (LASIX) 200 mg in 0.9% sodium chloride 100 mL infusion  10 mg/hr IntraVENous CONTINUOUS    balsam peru-castor oiL (VENELEX) ointment   Topical BID    anidulafungin (ERAXIS) 100 mg in 0.9% sodium chloride 130 mL IVPB  100 mg IntraVENous Q24H    heparin (porcine) injection 5,000 Units  5,000 Units SubCUTAneous Q8H    meropenem (MERREM) 1 g in 0.9% sodium chloride (MBP/ADV) 50 mL MBP  1 g IntraVENous Q8H    vancomycin (VANCOCIN) 1250 mg in  ml infusion  1,250 mg IntraVENous Q12H    metoclopramide HCl (REGLAN) injection 5 mg  5 mg IntraVENous Q6H    pantoprazole (PROTONIX) 40 mg in 0.9% sodium chloride 10 mL injection  40 mg IntraVENous DAILY    dexmedeTOMidine in 0.9 % NaCl (PRECEDEX) 400 mcg/100 mL (4 mcg/mL) infusion soln  0.1-1.5 mcg/kg/hr IntraVENous TITRATE    fentaNYL (PF) 1,500 mcg/30 mL (50 mcg/mL) infusion  0-200 mcg/hr IntraVENous TITRATE    propofol (DIPRIVAN) 10 mg/mL infusion  0-50 mcg/kg/min IntraVENous TITRATE    0.9% sodium chloride infusion  10 mL/hr IntraVENous CONTINUOUS    HYDROmorphone (DILAUDID) injection 1 mg  1 mg IntraVENous Q4H PRN    HYDROmorphone (DILAUDID) injection 0.5 mg  0.5 mg IntraVENous Q4H PRN    sodium chloride (NS) flush 5-40 mL  5-40 mL IntraVENous Q8H    sodium chloride (NS) flush 5-40 mL  5-40 mL IntraVENous PRN    bacitracin 500 unit/gram packet 1 Packet  1 Packet Topical PRN    0.45% sodium chloride infusion  10 mL/hr IntraVENous CONTINUOUS    naloxone (NARCAN) injection 0.4 mg  0.4 mg IntraVENous PRN    ondansetron (ZOFRAN) injection 4 mg  4 mg IntraVENous Q4H PRN    albuterol (PROVENTIL VENTOLIN) nebulizer solution 2.5 mg  2.5 mg Nebulization Q4H PRN    aspirin chewable tablet 81 mg  81 mg Oral DAILY    midazolam (VERSED) injection 1 mg  1 mg IntraVENous Q1H PRN    calcium chloride 1 g in 0.9% sodium chloride 250 mL IVPB  1 g IntraVENous PRN    polyethylene glycol (MIRALAX) packet 17 g  17 g Oral DAILY    ELECTROLYTE REPLACEMENT NOTE: Nurse to review Serum Potassium and Magnesuim levels and Initiate Electrolyte Replacement Protocol as needed  1 Each Other PRN    magnesium sulfate 1 g/100 ml IVPB (premix or compounded)  1 g IntraVENous PRN    glucose chewable tablet 16 g  4 Tablet Oral PRN    glucagon (GLUCAGEN) injection 1 mg  1 mg IntraMUSCular PRN    insulin lispro (HUMALOG) injection   SubCUTAneous AC&HS    insulin glargine (LANTUS) injection 1-50 Units  1-50 Units SubCUTAneous ONCE OVER 24 HOURS    lactated ringers bolus infusion 250 mL  250 mL IntraVENous Q1H PRN    dextrose 10 % infusion 0-250 mL  0-250 mL IntraVENous PRN    melatonin tablet 3-6 mg  3-6 mg Oral QHS PRN    EPINEPHrine (ADRENALIN) 5 mg in 0.9% sodium chloride 250 mL infusion  0-10 mcg/min IntraVENous TITRATE    lidocaine 4 % patch 2 Patch  2 Patch TransDERmal Q24H    insulin regular (NOVOLIN R, HUMULIN R) 100 Units in 0.9% sodium chloride 100 mL infusion  1-25 Units/hr IntraVENous TITRATE    LORazepam (ATIVAN) tablet 1 mg  1 mg Oral Q8H PRN    sertraline (ZOLOFT) tablet 100 mg  100 mg Oral DAILY    busPIRone (BUSPAR) tablet 15 mg  15 mg Oral TID        Objective:     Visit Vitals  BP (!) 109/59   Pulse 76   Temp 99.1 °F (37.3 °C)   Resp 20   Ht 5' 9\" (1.753 m)   Wt 120.6 kg (265 lb 14 oz)   SpO2 95%   BMI 39.26 kg/m²   Blood pressure (!) 109/59, pulse 76, temperature 99.1 °F (37.3 °C), resp. rate 20, height 5' 9\" (1.753 m), weight 120.6 kg (265 lb 14 oz), SpO2 95 %. 11/30 0701 - 11/30 1900  In: 120   Out: 450 [Urine:450]    11/28 1901 - 11/30 0700  In: 4784.5 [I.V.:4434.5]  Out: 8828 [Urine:4485]      Intake/Output Summary (Last 24 hours) at 11/30/2022 0947  Last data filed at 11/30/2022 0853  Gross per 24 hour   Intake 3685.36 ml   Output 3185 ml   Net 500.36 ml         Physical Exam:       General: acutely ill appearing young obese white male  HEENT: sclera anicteric, perrl  Chest:  CTA, No rhonchi, rales or rubs.   Heart: S1, S2, RRR  GI: distended, soft, absent BS  Extremities: no edema  CNS: sedated      Labs:       Recent Results (from the past 24 hour(s))   GLUCOSE, POC    Collection Time: 11/29/22  9:52 AM   Result Value Ref Range    Glucose (POC) 124 (H) 65 - 117 mg/dL    Performed by Cele Lanza RN    GLUCOSTABILIZER    Collection Time: 11/29/22  9:52 AM   Result Value Ref Range    Glucose 124 mg/dL    Insulin order 4.9 units/hour    Insulin adminstered 4.9 units/hour    Multiplier 0.077     Low target 95 mg/dL    High target 130 mg/dL    D50 order 0.0 ml    D50 administered 0.00 ml    Minutes until next BG 60 min    Order initials af     Administered initials af    GLUCOSE, POC    Collection Time: 11/29/22 11:00 AM   Result Value Ref Range    Glucose (POC) 102 65 - 117 mg/dL    Performed by Cele Lanza RN    GLUCOSTABILIZER    Collection Time: 11/29/22 11:00 AM   Result Value Ref Range    Glucose 102 mg/dL Insulin order 3.2 units/hour    Insulin adminstered 3.2 units/hour    Multiplier 0.077     Low target 95 mg/dL    High target 130 mg/dL    D50 order 0.0 ml    D50 administered 0.00 ml    Minutes until next BG 60 min    Order initials af     Administered initials af    BLOOD GAS,CHEM8,LACTIC ACID POC    Collection Time: 11/29/22 12:18 PM   Result Value Ref Range    Calcium, ionized (POC) 1.06 (L) 1.12 - 1.32 mmol/L    pH (POC) 7.42 7.35 - 7.45      pCO2 (POC) 42.7 35.0 - 45.0 MMHG    pO2 (POC) 105 (H) 80 - 100 MMHG    BICARBONATE 28 mmol/L    Base excess (POC) 2.8 mmol/L    Sample source ARTERIAL      CO2, POC 27 (H) 19 - 24 MMOL/L    Sodium,  136 - 145 MMOL/L    Potassium, POC 4.3 3.5 - 5.5 MMOL/L    Chloride,  100 - 108 MMOL/L    Glucose,  (H) 74 - 106 MG/DL    Creatinine, POC 1.6 (H) 0.6 - 1.3 MG/DL    Lactic Acid (POC) 1.00 0.40 - 2.00 mmol/L   GLUCOSTABILIZER    Collection Time: 11/29/22 12:28 PM   Result Value Ref Range    Glucose 117 mg/dL    Insulin order 4.4 units/hour    Insulin adminstered 4.4 units/hour    Multiplier 0.077     Low target 95 mg/dL    High target 130 mg/dL    D50 order 0.0 ml    D50 administered 0.00 ml    Minutes until next BG 60 min    Order initials af     Administered initials af    METABOLIC PANEL, COMPREHENSIVE    Collection Time: 11/29/22 12:54 PM   Result Value Ref Range    Sodium 141 136 - 145 mmol/L    Potassium 4.3 3.5 - 5.1 mmol/L    Chloride 106 97 - 108 mmol/L    CO2 29 21 - 32 mmol/L    Anion gap 6 5 - 15 mmol/L    Glucose 115 (H) 65 - 100 mg/dL    BUN 47 (H) 6 - 20 MG/DL    Creatinine 1.41 (H) 0.70 - 1.30 MG/DL    BUN/Creatinine ratio 33 (H) 12 - 20      eGFR >60 >60 ml/min/1.73m2    Calcium 8.3 (L) 8.5 - 10.1 MG/DL    Bilirubin, total 0.8 0.2 - 1.0 MG/DL    ALT (SGPT) 770 (H) 12 - 78 U/L    AST (SGOT) 482 (H) 15 - 37 U/L    Alk.  phosphatase 445 (H) 45 - 117 U/L    Protein, total 6.3 (L) 6.4 - 8.2 g/dL    Albumin 2.7 (L) 3.5 - 5.0 g/dL    Globulin 3.6 2.0 - 4.0 g/dL    A-G Ratio 0.8 (L) 1.1 - 2.2     MAGNESIUM    Collection Time: 11/29/22 12:54 PM   Result Value Ref Range    Magnesium 2.4 1.6 - 2.4 mg/dL   BLOOD GAS,CHEM8,LACTIC ACID POC    Collection Time: 11/29/22  1:51 PM   Result Value Ref Range    Calcium, ionized (POC) 1.06 (L) 1.12 - 1.32 mmol/L    pH (POC) 7.45 7.35 - 7.45      pCO2 (POC) 40.0 35.0 - 45.0 MMHG    pO2 (POC) 67 (L) 80 - 100 MMHG    BICARBONATE 28 mmol/L    Base excess (POC) 3.5 mmol/L    Sample source ARTERIAL      CO2, POC 27 (H) 19 - 24 MMOL/L    Sodium,  136 - 145 MMOL/L    Potassium, POC 4.1 3.5 - 5.5 MMOL/L    Chloride,  100 - 108 MMOL/L    Glucose,  (H) 74 - 106 MG/DL    Creatinine, POC 1.6 (H) 0.6 - 1.3 MG/DL    Lactic Acid (POC) 1.00 0.40 - 2.00 mmol/L   GLUCOSTABILIZER    Collection Time: 11/29/22  1:55 PM   Result Value Ref Range    Glucose 114 mg/dL    Insulin order 4.2 units/hour    Insulin adminstered 4.2 units/hour    Multiplier 0.077     Low target 95 mg/dL    High target 130 mg/dL    D50 order 0.0 ml    D50 administered 0.00 ml    Minutes until next BG 60 min    Order initials af     Administered initials af    GLUCOSE, POC    Collection Time: 11/29/22  3:17 PM   Result Value Ref Range    Glucose (POC) 110 (H) 65 - 100 mg/dL    Performed by Elle Mac    Collection Time: 11/29/22  3:18 PM   Result Value Ref Range    Glucose 110 mg/dL    Insulin order 3.9 units/hour    Insulin adminstered 3.9 units/hour    Multiplier 0.077     Low target 95 mg/dL    High target 130 mg/dL    D50 order 0.0 ml    D50 administered 0.00 ml    Minutes until next  min    Order initials CNb     Administered initials CNB    GLUCOSE, POC    Collection Time: 11/29/22  5:28 PM   Result Value Ref Range    Glucose (POC) 115 (H) 65 - 100 mg/dL    Performed by Claire Garza \"[place]\" RN    GLUCOSTABILIZER    Collection Time: 11/29/22  5:29 PM   Result Value Ref Range    Glucose 115 mg/dL    Insulin order 4.2 units/hour    Insulin adminstered 4.2 units/hour    Multiplier 0.077     Low target 95 mg/dL    High target 130 mg/dL    D50 order 0.0 ml    D50 administered 0.00 ml    Minutes until next  min    Order initials af     Administered initials af    GLUCOSE, POC    Collection Time: 11/29/22  7:46 PM   Result Value Ref Range    Glucose (POC) 101 (H) 65 - 100 mg/dL    Performed by [employer]    Collection Time: 11/29/22  7:47 PM   Result Value Ref Range    Glucose 101 mg/dL    Insulin order 3.2 units/hour    Insulin adminstered 3.2 units/hour    Multiplier 0.077     Low target 95 mg/dL    High target 130 mg/dL    D50 order 0.0 ml    D50 administered 0.00 ml    Minutes until next  min    Order initials      Administered initials     GLUCOSE, POC    Collection Time: 11/29/22  9:53 PM   Result Value Ref Range    Glucose (POC) 111 (H) 65 - 100 mg/dL    Performed by [employer]    Collection Time: 11/29/22  9:55 PM   Result Value Ref Range    Glucose 111 mg/dL    Insulin order 3.9 units/hour    Insulin adminstered 3.9 units/hour    Multiplier 0.077     Low target 95 mg/dL    High target 130 mg/dL    D50 order 0.0 ml    D50 administered 0.00 ml    Minutes until next  min    Order initials      Administered initials hh    GLUCOSE, POC    Collection Time: 11/29/22 11:52 PM   Result Value Ref Range    Glucose (POC) 104 65 - 117 mg/dL    Performed by [employer]    Collection Time: 11/29/22 11:53 PM   Result Value Ref Range    Glucose 104 mg/dL    Insulin order 3.4 units/hour    Insulin adminstered 3.4 units/hour    Multiplier 0.077     Low target 95 mg/dL    High target 130 mg/dL    D50 order 0.0 ml    D50 administered 0.00 ml    Minutes until next  min    Order initials hh     Administered initials hh    GLUCOSE, POC    Collection Time: 11/30/22  1:49 AM   Result Value Ref Range    Glucose (POC) 99 65 - 117 mg/dL    Performed by Sid Multani    GLUCOSTABILIZER    Collection Time: 11/30/22  1:50 AM   Result Value Ref Range    Glucose 99 mg/dL    Insulin order 3.0 units/hour    Insulin adminstered 3.0 units/hour    Multiplier 0.077     Low target 95 mg/dL    High target 130 mg/dL    D50 order 0.0 ml    D50 administered 0.00 ml    Minutes until next  min    Order initials      Administered initials     GLUCOSE, POC    Collection Time: 11/30/22  3:54 AM   Result Value Ref Range    Glucose (POC) 104 65 - 117 mg/dL    Performed by Pastora Miles    Collection Time: 11/30/22  3:55 AM   Result Value Ref Range    Glucose 104 mg/dL    Insulin order 3.4 units/hour    Insulin adminstered 3.4 units/hour    Multiplier 0.077     Low target 95 mg/dL    High target 130 mg/dL    D50 order 0.0 ml    D50 administered 0.00 ml    Minutes until next  min    Order initials      Administered initials     MAGNESIUM    Collection Time: 11/30/22  4:33 AM   Result Value Ref Range    Magnesium 2.3 1.6 - 2.4 mg/dL   UNFRACTIONATED AND LMW HEPARIN    Collection Time: 11/30/22  4:33 AM   Result Value Ref Range    Heparin Xa,Unfrac. and LMW <0.10 IU/mL   CBC WITH AUTOMATED DIFF    Collection Time: 11/30/22  4:33 AM   Result Value Ref Range    WBC 20.6 (H) 4.1 - 11.1 K/uL    RBC 3.33 (L) 4.10 - 5.70 M/uL    HGB 9.0 (L) 12.1 - 17.0 g/dL    HCT 28.9 (L) 36.6 - 50.3 %    MCV 86.8 80.0 - 99.0 FL    MCH 27.0 26.0 - 34.0 PG    MCHC 31.1 30.0 - 36.5 g/dL    RDW 15.7 (H) 11.5 - 14.5 %    PLATELET 190 183 - 425 K/uL    MPV 10.0 8.9 - 12.9 FL    NRBC 0.6 (H) 0  WBC    ABSOLUTE NRBC 0.12 (H) 0.00 - 0.01 K/uL    NEUTROPHILS 83 (H) 32 - 75 %    LYMPHOCYTES 7 (L) 12 - 49 %    MONOCYTES 4 (L) 5 - 13 %    EOSINOPHILS 6 0 - 7 %    BASOPHILS 0 0 - 1 %    IMMATURE GRANULOCYTES 0 0.0 - 0.5 %    ABS. NEUTROPHILS 17.2 (H) 1.8 - 8.0 K/UL    ABS. LYMPHOCYTES 1.4 0.8 - 3.5 K/UL    ABS. MONOCYTES 0.8 0.0 - 1.0 K/UL    ABS.  EOSINOPHILS 1.2 (H) 0.0 - 0.4 K/UL    ABS. BASOPHILS 0.0 0.0 - 0.1 K/UL    ABS. IMM. GRANS. 0.0 0.00 - 0.04 K/UL    DF MANUAL      RBC COMMENTS ANISOCYTOSIS  1+       METABOLIC PANEL, BASIC    Collection Time: 11/30/22  4:33 AM   Result Value Ref Range    Sodium 145 136 - 145 mmol/L    Potassium 3.8 3.5 - 5.1 mmol/L    Chloride 109 (H) 97 - 108 mmol/L    CO2 27 21 - 32 mmol/L    Anion gap 9 5 - 15 mmol/L    Glucose 102 (H) 65 - 100 mg/dL    BUN 41 (H) 6 - 20 MG/DL    Creatinine 1.02 0.70 - 1.30 MG/DL    BUN/Creatinine ratio 40 (H) 12 - 20      eGFR >60 >60 ml/min/1.73m2    Calcium 8.7 8.5 - 10.1 MG/DL   HEPATIC FUNCTION PANEL    Collection Time: 11/30/22  4:33 AM   Result Value Ref Range    Protein, total 6.0 (L) 6.4 - 8.2 g/dL    Albumin 2.4 (L) 3.5 - 5.0 g/dL    Globulin 3.6 2.0 - 4.0 g/dL    A-G Ratio 0.7 (L) 1.1 - 2.2      Bilirubin, total 0.8 0.2 - 1.0 MG/DL    Bilirubin, direct 0.3 (H) 0.0 - 0.2 MG/DL    Alk.  phosphatase 388 (H) 45 - 117 U/L    AST (SGOT) 376 (H) 15 - 37 U/L    ALT (SGPT) 659 (H) 12 - 78 U/L   PROTHROMBIN TIME + INR    Collection Time: 11/30/22  4:33 AM   Result Value Ref Range    INR 1.2 (H) 0.9 - 1.1      Prothrombin time 12.4 (H) 9.0 - 11.1 sec   GLUCOSE, POC    Collection Time: 11/30/22  5:56 AM   Result Value Ref Range    Glucose (POC) 121 (H) 65 - 117 mg/dL    Performed by Aimee Route    Collection Time: 11/30/22  5:57 AM   Result Value Ref Range    Glucose 121 mg/dL    Insulin order 4.7 units/hour    Insulin adminstered 4.7 units/hour    Multiplier 0.077     Low target 95 mg/dL    High target 130 mg/dL    D50 order 0.0 ml    D50 administered 0.00 ml    Minutes until next  min    Order initials hh     Administered initials hh    GLUCOSE, POC    Collection Time: 11/30/22  8:05 AM   Result Value Ref Range    Glucose (POC) 107 65 - 117 mg/dL    Performed by Kaveh Lopez (BRYAN RN)    GLUCOSTABILIZER    Collection Time: 11/30/22  8:07 AM   Result Value Ref Range    Glucose 107 mg/dL Insulin order 3.6 units/hour    Insulin adminstered 3.6 units/hour    Multiplier 0.077     Low target 95 mg/dL    High target 130 mg/dL    D50 order 0.0 ml    D50 administered 0.00 ml    Minutes until next  min    Order initials af     Administered initials af    LABRCNT(wbc:2,hgb:2,hct:2,plt:2,)  Recent Labs     11/30/22  0433 11/29/22  1254 11/29/22  0345    141 139   K 3.8 4.3 4.5   * 106 104   CO2 27 29 29   BUN 41* 47* 43*   CREA 1.02 1.41* 1.31*   * 115* 129*   CA 8.7 8.3* 8.4*   MG 2.3 2.4 2.3   LABRCNT(sgot:3,gpt:3,ap:3,tbiL:3,TP:3,ALB:3,GLOB:3,ggt:3,aml:3,amyp:3,lpse:3,hlpse:3)  Recent Labs     11/30/22  0433 11/29/22  0345 11/28/22  0600 11/27/22  1652   INR 1.2* 1.3* 1.3*  --    PTP 12.4* 12.9* 13.6*  --    APTT  --   --   --  28.9     Recent Labs     11/30/22  0433 11/29/22  1254 11/29/22  0345   * 445* 461*   TP 6.0* 6.3* 6.4   ALB 2.4* 2.7* 2.7*   GLOB 3.6 3.6 3.7   BRIEFLAB(B12,FOL,FOLAT,RBCF)No results found for: FOL, RBCFLABRCNT(CPK:3,CpKMB:3,ckndx:3,troiq:3)No components found for: GLPOCBRIEFLAB(CHOL,CHOLX,CHOLP,CHLST,CHOLV,HDL,HDLC,HDLP,LDL,DLDL,LDLC,DLDLP,TGL,TGLX,TRIGL,TRIGP,CHHD,CHHDX)  Recent Labs     11/29/22  0816 11/28/22  0848   PH 7.43 7.39   PCO2 40 47*   PO2 86 84   LABRCNT(CPK:3,CpKMB:3,ckndx:3,troiq:3)LOLLY Priest  No results for input(s): CPK, CKNDX, TROIQ in the last 72 hours. No lab exists for component: LOLLY Bae      Problem List:     Active Problems:    Acute non-Q wave non-ST elevation myocardial infarction (NSTEMI) ([address] (11/15/2022)      Aortic stenosis (11/22/2022)      CAD (coronary artery disease) (11/22/2022)      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)      Overview:  On pump CORONARY ARTERY BYPASS GRAFT  X 1 WITH LEFT INTERNAL MAMMARY       ARTERY to LAD      AORTIC VALVE REPLACEMENT WITH MEYER 25MM INSPIRIS RESILIA TISSUE VALVE       REPAIR OF ASCENDING AORTIC ANEURYSM with 26mm hemashield graft      S/P AVR (aortic valve replacement) and aortoplasty (11/22/2022)      Overview: On pump CORONARY ARTERY BYPASS GRAFT  X 1 WITH LEFT INTERNAL MAMMARY       ARTERY to LAD      AORTIC VALVE REPLACEMENT WITH MEYER 25MM INSPMITCHELL ZAPATAA TISSUE VALVE       REPAIR OF ASCENDING AORTIC ANEURYSM with 26mm hemashield graft  Assessment/Plan:  Mr. Agustina Coon is a 43yo with T2DM on insulin A1C 9-10, hypothyroidism on LT4, s/p on-pump CABG 11/22/2022 with tissue aortic valve replacement and ascending aortic aneurysm repair due to bicuspic AV, COVID positive rapic PCR negative 11/23, worsening hypoxemic respiratory failure s/p reintubation 11/24/2022 now with worsening PEEP and FiO2 requirements, severe SERGE 11/24 Cr of 2 now 1.02, with severe acute hepatitis. Dr. Ileana Cano personally reviewed CT triple phase liver with Dr. Montez Galarza of interventional radiology. No hepatic vein dilation, no clots, portal venous system intact and no clot, no hepatic infarct. This is good news. Awaiting serologies for other causes of liver injury. Luís Deer #s personally reviewed by Dr. Ileana Cano and are not concerning. Needs daily INR and LFTs. -Improving    With fever 102.4, awaiting Bcx. Peripheral smear with erythroleukamoid rxn nonspecific but no hemolysis. Given absent bowel sounds, would continue to hold TF's and check KUB     Prognosis is guarded. Remains critically ill and is at high risk of decompensation if not death. **    Recall:  Reviewed all catheterization and operative notes since admission. US 11/27/2022 with hepatomegaly with diffuse echocenicity limited views, as well as splenomegaly. CT 4/2022 with normal appearing liver and spleen. 11/15/2022, and even September and April of 2022 and 12/2021 with normal LFTs.       Since 11/22pm he has had progressive worsening of transaminitis in the 100-300range ALT, 260-350 AST with intermittent mild hyperbilirubinemia of 1.1-1.3.      11/26/2022 LFTs alb 2.7, Tb 1.2, , ,   11/27/2022 LFTs alb 2.7, Tb 1.1, ALT 1137, AST >2000,      He has had mild thrombmocytopenia ranging 144-183 throughout 2022 and the trough was 107 on 11/24. No INR since 11/22 and it was normal at 1.1. This is a critical hepatitis, but his liver function appears intact so far. Usually if ischemic from surgery, the peak is sooner after the event then downtrends. He did have some mild worsening hepatitis which peaked 11/24 and was down trending slowly. He has an acute critical hepatitis insult. Unlikely to be an acute viral infection, but will send studies. Main concern is an acute thrombosis or severe congestion now that he has splenomegaly and diffuse congestion in liver. Reviewed MAR at length for the past 10 days. Minimal use of tylenol, as well as amiodarone started 11/23. He has been on continuous high-intensity statin rosuvastatin 40mg daily, recommend temporary holding of this. If possible, recommend holding amiodarone. I do recommend empiric NAC protocol. Will defer to ICU team to order as there may be issues with volume overload. Will send serologies for viral hepatitis infections, including hepatitis D superinfection and HSV (strongly doubt), but also CMV and EBV. Will send autoimmune hepatitis studies. Again, I doubt this is a cause. There is a preliminary read of limited TTE by Dr. Álvaro Roche - normal appearing R heart function and trace pericardial effusion. That is good news. His mean PAP on Bryan catheter has been slowly increasing the past few days and is >30.     LOLLY Kolb    11/30/2022 [address] [facility], [address] [place] [address]  [phone] [id number]  [address] [place] [address] [place]: [phone]

## 2022-11-30 NOTE — PROGRESS NOTES
[facility] ICU Progress Note    Admit Date: 11/15/2022  POD:  8 Day Post-Op    Procedure:  Procedure(s):  SYLWIA BY DR Karen Whitaker -  CORONARY ARTERY BYPASS GRAFT  X 1 WITH LEFT INTERNAL MAMMARY ARTERY GRAFTING - AORTIC VALVE REPLACEMENT WITH MEYER 25MM INSPIRIS RESILIA TISSUE VALVE AND REPAIR OF ASCENDING AORTIC ANEURYSM        Subjective/Interval:   Pt seen with Dr. Karel Jarvis. Pt intubated and sedated. On Vent 45% PEEP 10. O2 sat downt o 88% overnight, PEEP increased from 5 to 10. Temp down this am without use of cooling blanket    On Epi @ 2, taken to 1 and Insulin gtt, Dex/ Fent/ Prop gtts for sedation     Objective:   Vitals:  Blood pressure (!) 109/59, pulse 76, temperature 99.1 °F (37.3 °C), resp. rate 20, height 5' 9\" (1.753 m), weight 265 lb 14 oz (120.6 kg), SpO2 95 %.   Temp (24hrs), Av.4 °F (37.4 °C), Min:97.2 °F (36.2 °C), Max:102.6 °F (39.2 °C)    Hemodynamics:   CO: CO (l/min): 5.9 l/min   CI: CI (l/min/m2): 2.6 l/min/m2   CVP: CVP (mmHg): 11 mmHg (22 0815)   SVR: SVR (dyne*sec)/cm5: 1092 (dyne*sec)/cm5 (22 258)   PAP Systolic: PAP Systolic: 28 (/56/1994)   PAP Diastolic: PAP Diastolic: 19 (554)   PVR:     SV02: SVO2 (%): 74 % (22 0800)   SCV02:      EKG/Rhythm:  NSR 70-80s    NGT outout: 500mL in 24hrs - keep to suction 1 more day    Ventilator:  Ventilator Volumes  Vt Set (ml): 450 ml (22 0713)  Vt Exhaled (Machine Breath) (ml): 475 ml (22 0713)  Vt Spont (ml): 455 ml (229)  Ve Observed (l/min): 9.42 l/min (22 0713)    Oxygen Therapy:  Oxygen Therapy  O2 Sat (%): 95 % (22 0815)  Pulse via Oximetry: 76 beats per minute (22 0815)  O2 Device: Endotracheal tube;Ventilator (22 0400)  Skin Assessment: Clean, dry, & intact (22)  Skin Protection for O2 Device: Yes (22)  Orientation: Anterior;Bilateral (22)  Location: Cheek (22)  Interventions: Skin Barrier (22)  O2 Flow Rate (L/min): 6 l/min (11/28/22 1800)  FIO2 (%): 45 % (11/30/22 0713)    CXR  CXR Results  (Last 48 hours)                 11/30/22 0525  XR CHEST PORT Final result    Impression:  No significant change. Narrative:  INDICATION:  post op       EXAM: CXR Portable. FINDINGS: Portable chest shows satisfactory support lines/devices without   significant change since yesterday. There is no apparent pneumothorax. Lungs   show unchanged haziness. Heart size is stable. There is no new finding. 11/29/22 0546  XR CHEST PORT Final result    Impression:  No significant change. Narrative:  INDICATION:  post op       EXAM: CXR Portable. FINDINGS: Portable chest shows satisfactory support lines/devices without   significant change since yesterday. There is no apparent pneumothorax. Lungs   show stable bibasilar nonspecific haziness. Heart size is stable. There is no   overt pulmonary edema. Admission Weight: Last Weight   Weight: 257 lb 15 oz (117 kg) Weight: 265 lb 14 oz (120.6 kg)     Intake / Output / Drain:  Current Shift: 11/30 0701 - 11/30 1900  In: 120   Out: 450 [Urine:450]  Last 24 hrs.:   Intake/Output Summary (Last 24 hours) at 11/30/2022 0943  Last data filed at 11/30/2022 0853  Gross per 24 hour   Intake 3685.36 ml   Output 3185 ml   Net 500.36 ml     EXAM:  General: Intubated, sedated. Lungs:    Coarse to auscultation bilaterally   Incision:  Incision clean, dry and intact and prineo intact   Heart:   Regular rate and rhythm  and no murmurs, rubs or gallops   Abdomen:    Distended, soft, hypoactive bowel sounds, and obese. Extremities:  Trace edema BLE, BUE   Neurologic:  Intubated, sedated.       Labs:   Recent Labs     11/30/22  0805 11/30/22  0556 11/30/22  0433   WBC  --   --  20.6*   HGB  --   --  9.0*   HCT  --   --  28.9*   PLT  --   --  279   NA  --   --  145   K  --   --  3.8   BUN  --   --  41*   CREA  --   --  1.02   GLU  --   --  102*   GLUCPOC 107   < >  --    INR  --   --  1.2*    < > = values in this interval not displayed. Assessment:     Active Problems:    Acute non-Q wave non-ST elevation myocardial infarction (NSTEMI) ([address] (11/15/2022)      Aortic stenosis (11/22/2022)      CAD (coronary artery disease) (11/22/2022)      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)      Overview: On pump CORONARY ARTERY BYPASS GRAFT  X 1 WITH LEFT INTERNAL MAMMARY       ARTERY to LAD      AORTIC VALVE REPLACEMENT WITH MEYER 25MM INSPIRIS RESILIA TISSUE VALVE       REPAIR OF ASCENDING AORTIC ANEURYSM with 26mm hemashield graft      S/P AVR (aortic valve replacement) and aortoplasty (11/22/2022)      Overview: On pump CORONARY ARTERY BYPASS GRAFT  X 1 WITH LEFT INTERNAL MAMMARY       ARTERY to LAD      AORTIC VALVE REPLACEMENT WITH MEYER 25MM INSPIRIS RESILIA TISSUE VALVE       REPAIR OF ASCENDING AORTIC ANEURYSM with 26mm hemashield graft       Plan/Recommendations/Medical Decision Making:     Severe symptomatic AS, s/p tissue AVR. Continue ASA. CAD, STEMI, s/p CABG. Continue ASA. Holding amio, statin for transaminitis. Consider BB when off pressors. repeat Echo without effusion or tamponade, NL LV/RVF, CI maintaining > 2. Add Raymond gtt for BP management, wean Epi  to 1 today. Remove [place]  Acute hypoxic respiratory failure:developed hypoxia at approximately 11pm on 11/24 requiring Bipap and was ultimately re-intubated at 0200 for acute respiratory failure. Bronchoscopy was completed 11/24 and unremarkable. SYLWIA completed 11/24 w normal RV and LVF w/o effusion or shunting. CTA chest w/o PE, heparin gtt stopped. FiO2 down to 45%, PEEP down to 10. Goal to get PEEP down today - take down to 8 today, 5 tomorrow  SERGE: Crt up to 2.01. Nephrology consult appreciated, avoid nephrotoxins, trend. Crt down today at 1.03. Increase lasix gtt back to 10mg/hr  Acute blood loss anemia: H&H 9/28.9 this am  Transaminitis.  Acute Severe Hepatitis, GI consult appreciated, patient now with hepatomegaly and splenomegaly. Viral panel sent. Triple phase liver scan completed yesterday. No hepatic of splenic ischemia noted. + hepatic steatosis. Labs cont to trend down. Leukocytosis: BC, Sputum and urine cultures NTD. Seen by ID, Abx changed to Merrem and Vanco. Eraxis started by ICU, fungal cx pending. WBC back up to 20.6, afebrile now without cooling blanket/ice packs. Will add procal for tomorrows labs  HTN. On ACEi, BB PTA; resume BB when appropriate. Hypotensive, on minimal baldev   HLD. holding statin. WALLY , not on CPAP. Was unable to tolerate due to anxiety; he has been working on this with Sleep Medicine. OP follow-up. DMII. On [place] at home. Repeat A1C 9.0- Insulin gtt per protocol. Diabetes management following. Hypothyroidism. On Synthroid PTA; continue. Repeat TSH 1.96. GERD. Continue PPI. Anxiety and depression. On Buspar, Zoloft; continue. Supportive care. Nutrition: holding feeds at this time per Surgery recs, see below  Ileus:Increase schedule bowel regimen. Remains on reglan per ICU. per Gen Surg \"Recommend continue NGT to suction and consider TPN per nutritionist recommendations. Would continue NGT decompression until daily outputs below 500 cc and then could attempt trickle feeds. \". hopefully start trickle feeds tomorrow, canister with 500mL this am per nursing. DVT ppx- Heparin subcu  Dispo- remain in ICU. Wean Epi to 1. D/c [place]. Wean PEEP to 5.        Signed By: Darryle Bilis, NP

## 2022-11-30 NOTE — PROGRESS NOTES
Remote ID Cross Cover chart check  Fever curve better  LfT better  ? Inflammatory etiology causing fevers   ? Congestion in biliary tree/liver causing elevations vs meds     ID workup for cultures in progress    Hopeful de escalation of antimicrobials based on course.  On empiric Vancomycin, Meropenem, Eraxis    Likely stop vancomycin and Eraxis first if no + cultures to support use     [facility] ID to follow from 12/2/22    Contact ID on call in interim if issues

## 2022-11-30 NOTE — PROGRESS NOTES
0700 received report from [facility] 72.. 0800 assessment completed, see flowsheet. Round with Dr. Cory Molina and team. Plans to wean Epi and keep CI >2 , wean peep down as tolerated, add bowel regimen     0900 IDR with Dr Za Ivory. Plans to start trickle feeds, verbal order to advance NGT 4cm per MD.     0930 lloyd drain removed at bedside by NP Deana. 1000 peep weaned down to 8    1200 remains febrile, ice packs applied to arpits and groin. Cooling blanket placed underneath patient. 1300 per CTS NP and gen surg note hold TF, keep NGT to suction until output <500 cc in 24 hr period. NGT placed back to suction. 1310 Peep titrated down to 5 by RT.     1330 sats 90%, RT paged. Peep increased back up to 6       1400 goal to wean sedation, per Dr Cory Molina change fent to 50mcg/hr    1600 spoke with NP Chester Gipson , plans to restart lasix drip. 1700 family at bedside, updated. 1900 bedside report given to [facility].

## 2022-11-30 NOTE — PROGRESS NOTES
Critical Care Note      Cardiac surgery was called for the evaluation and management of: acute respiratory failure; acute hepatitis; sepsis     The critical nature of the patient's condition includes the following: emergent reintubation and multisystem organ failure      Diagnosis for which the procedure was performed: AS     Procedure performed: AVR Asc CABGx1     Other critical care diagnoses that are being treated and are above and beyond the standard care for the procedure being performed:     Severe acute hepatitis  Colonic distention  Delayed gastric emptying        HPI: Patient underwent an uncomplicated AVR Ascending aortic replacement but experienced refractory hypoxemia and was emergently reintubated the night of POD1. He has had fevers, acute hepatitis and  prolonged respiratory failure of unexplained etiology. Acute hypoxemic respiratory failure  Fio2 down to 50% and PEEP 10 - paO2 86 at these settings on repeat ABG  Had been down to 50% and PEEP 5 yesterday, but derecruited and hypoxic to 80s yesterday evening  Back up to PEEP 10 overnight  Will gradually wean today  Lighten sedation simultaneously  Restart lasix gtt at 10 to get negative     Severe acute hepatitis  LFTs continue to trend down  Will continue to optimize perfusion and keep aggressive diuresis     Sepsis / Fever of unknown origin  WBC continues to climb - 25 today - but fevers waning  Repeat Blood cx NGTD; procalcitonin not significantly elevated  On vanc meropenem flagyl now eracsis - ID following - will start to taper as he improves        Ileus  NGT output still over 500mL  -> Will hold trickle feeds until reduced output per general surgery suggestions          Discussed plan with ICU physician, Dr. Dennis Mathur and bedside nurse     Total critical care time - 80 minutes (CPT 99 [id number], [id number])      I personally spent the above critical care time.   This is time spent at this critically ill patient's bedside / unit / floor actively involved in patient care as well as the coordination of care. This does not include any procedural time which has been billed separately. This does not include any NP/PA patient care time. I had a face to face encounter with the patient, reviewed and interpreted patient data including clinical events, labs, images, vital signs, I/O's, and examined patient. I have discussed the case and the plan and management of the patient's care with the consulting services and bedside nurses. This patient has a high probability of imminent, clinically significant deterioration, which requires the highest level of preparedness to intervene urgently. I participated in the decision-making and personally managed or directed the management of life and organ supporting interventions to treat or prevent imminent deterioration. This patient has a critical illness or injury that is acutely impairing one or more vital organ systems such that there is a high probability of imminent or life threatening deterioration in the patient's condition. This patient requires high complexity medical decision making to assess, manipulate, and support vital organ system failure. After stabilization, this patient still requires management to prevent further life / organ threatening deterioration.

## 2022-11-30 NOTE — PROGRESS NOTES
0700 bedside report received from [facility]. 0800 rounds with CTS team, plans to wean peep to 8 today, wean epi to 1, titrate down sedation as tolerated. Increasing lasix drip to achieve negative fluid balance. 0900 GI at bedside, plans to repeat KUB and keep NGT to suction. 1000 critical care IDR completed. 1200 temp rising, ice packs applied to armpits and groin. BillAshley Bee 124 removed at bedside, no issues. Tip intact. 1315 called NP with CTS rima, message left for callback. 1320 spoke with critical care MD, notified of frequent PVCs. Order for BMP now. [address] NP at bedside, informed of frequent PVC. Order to use electrolyte replacement protocol for potassium and mag.     1500 mother at bedside, updates provided. 1600 patient is more awake, following commands consistently, nods yes/no . Opens eyes to voice. Dr Mo Vaca at bedside , goal to wean peep down to 5 tonight.     1900 bedside report given to [facility]

## 2022-11-30 NOTE — PROGRESS NOTES
Pharmacy Automatic Renal Dosing Protocol - Antimicrobials    Indication for Antimicrobials: sepsis     Current Regimen of Each Antimicrobial:   Vancomycin - pharmacy to dose; started ; day 6  Meropenem 1 gm IV q8h; started ; day 3  Anidulafungin 100 mg q24h; started ; day 3    Previous Antimicrobial Therapy:   Periop cefazolin and vanc   Metronidazole 500 mg IV q12h; started 11/25 x 4 days  Cefepime 2g IV q8h ; started 11/25 x 4 days     Goal Level: VANCOMYCIN TROUGH GOAL RANGE  Vancomycin Trough: 15 - 20 mcg/mL  (AUC: 400 - 600 mg/hr/Liter/day)     Date Dose & Interval Measured (mcg/mL) Extrapolated (mcg/mL)    S/p load 8 -    1000 mg q12h 9.9 430    03:45 1250 mg q12h 19.1 18.41 /      Dosing calculator used: Excel-based calculator (poor model fit with [employer])    Significant Cultures:    blood cx NGTD pending   Sputum cx (ETT aspirate) NGTD, pending    blood cx NGTD pending   blood for fungus - pending     Paralysis, amputations, malnutrition: -    Labs:  Recent Labs     22  0433 22  1254 22  0451 22  0345   CREA 1.02 1.41*  --  1.31*   BUN 41* 47*  --  43*   WBC 20.6*  --  17.2* PLEASE DISREGARD RESULTS     Temp (24hrs), Av.9 °F (37.7 °C), Min:97.2 °F (36.2 °C), Max:102.7 °F (39.3 °C)    Creatinine Clearance (mL/min) or Dialysis: 93.4 mL/min (IBW)    Impression/Plan:   Continue vancomycin 1250 mg IV q12h for a predicted AUC of 584. Next Vanc level on  with am labs  Continue meropenem and anidulafungin as above  Antimicrobial stop date pending     Pharmacy will follow daily and adjust medications as appropriate for renal function and/or serum levels.     Thank you,  Wilbert Orozco, PHARMD

## 2022-11-30 NOTE — PROGRESS NOTES
Critical Care Progress Note  Black Galarza MD          Date of Service:  2022  NAME:  Tniy Solares  :  1980  MRN:  [id number]      Subjective/Hospital course:      37 y/o male POD 1 from CABG x1 an AV valve replacement. He arrived intubated and on pressors. Extubated  on POD 0 @ 19:00. Noted to have SERGE with creatinine rise 2. Possibly secondary to hypotension in OR. Night of  was worked up for hypoxemia. This morning he tested positive for Covid. Case discussed with cT surgery. Most likely diagnosis is PE most likely secondary to hypercoaguable state from Covid. Pt had no respiratory symptoms preop or post op suggestive of URI      - Overnight developed hypoxemia. Pt had bronch and SYLWIA neither of which explained cause for hypoxemia. He required re-intubation and was put on veletri.  - intermittent desaturation events. CXR this a.m. with pulmonary edema   - fio2 improved   : fio2 and PEEP requirement has increased, still requiring epi at 2 mcg/min   : remains on epinephrine drip, sedated, fio2 requirement decreased to 80%'LFT's are trending   Down   : remains on epi drip, diueresed well with lasix drip,fio2 decreased to 50% and peep to 6  : Patient remains intubated and sedated. Being diuresed with lasix gtt. Assessment/Plan:     PULMONARY:  Acute hypoxic resopiratory failure   Covid rapid +, PCR negative  D dimer / LE doppler negative - unlikely PE  Pulmonary edema/ pleural effusions  Ct chest : no PE  Plan  - stop heparin - discussed with CTS  - Vent settings established, reviewed and/or adjusted as per orders  - Continue nebulized bronchodilators and steroids  - Continue diuresis as tolerated.     CARDIOVASCULAR/HEMODYNAMIC:  Continue epi drip       Plan    - ICU hemodynamic/cardiac monitoring  - MAP goal > 65 mmHg      RENAL:  SERGE    Plan  - Monitor I/Os and Uo  - Monitor renal function panel intermittently  - Correct electrolytes as needed  - Avoid nephrotoxic meds      GI/NUTRITION:  CT abdomen : normal liver vasculature  Hepatic steatosis : suspect cholestasis as the cause for the elevated LFT's  Continue reglan  Laxatives to move bowels  Trickle feeds when output less than 500    INFECTIOUS DISEASE  Covid ruled out with 2 negative PCR  Intermittent fevers  Cultures pending   Pro rosy is only slightly elevated    Micro:    Plan  - Continue broad spectrum abx   Add eraxis   ID consult  ? Non infectious     NEURO  Sedation for vent synchrony    ENDO : TSH is elevated  Increase synthroid to 150 mcg once a day    Plan    - Daily SAT when meets ICU criteria  - ABCDEF mobility bundle  - PT/OT involvement when appropriate          Care Plan discussed with: attending, CTS NP and bedside nurse, GI , radiology    I personally spent 40 minutes of critical care time. This is time spent at this critically ill patient's bedside actively involved in patient care as well as the coordination of care and discussions with the patient's family. This does not include any procedural time which has been billed separately. Review of Systems:   ROS   Intubated     Vital Signs:   Patient Vitals for the past 4 hrs:   Temp Pulse Resp SpO2   11/30/22 1200 (!) 101.1 °F (38.4 °C) 92 20 93 %   11/30/22 1128 -- 85 20 94 %   11/30/22 1000 100 °F (37.8 °C) 86 20 96 %   11/30/22 0930 99.7 °F (37.6 °C) 85 20 96 %   11/30/22 0900 99.5 °F (37.5 °C) 81 20 95 %          Intake/Output Summary (Last 24 hours) at 11/30/2022 1230  Last data filed at 11/30/2022 1205  Gross per 24 hour   Intake 4156.36 ml   Output 3195 ml   Net 961.36 ml          Physical Examination:    Young, well built  HEENT: normal  Heart: s1,s2  Lungs: clear  Abd: distended, no bowel sounds  Ext: no edema  Cns: sedated     Labs and Imaging:   Reviewed.       Medications:     Current Facility-Administered Medications   Medication Dose Route Frequency    [START ON 12/1/2022] Vancomycin level with AM labs 12/1  1 Each Other ONCE    insulin glargine (LANTUS) injection 50 Units  50 Units SubCUTAneous Q12H    insulin lispro (HUMALOG) injection   SubCUTAneous Q6H    glucose chewable tablet 16 g  4 Tablet Oral PRN    0.9% sodium chloride infusion 250 mL  250 mL IntraVENous PRN    PHENYLephrine (TUYET-SYNEPHRINE) 30 mg in 0.9% sodium chloride 250 mL infusion   mcg/min IntraVENous TITRATE    bisacodyL (DULCOLAX) suppository 10 mg  10 mg Rectal DAILY    senna-docusate (PERICOLACE) 8.6-50 mg per tablet 2 Tablet  2 Tablet Oral DAILY    levothyroxine (SYNTHROID) tablet 150 mcg  150 mcg Oral 6am    furosemide (LASIX) 200 mg in 0.9% sodium chloride 100 mL infusion  10 mg/hr IntraVENous CONTINUOUS    balsam peru-castor oiL (VENELEX) ointment   Topical BID    anidulafungin (ERAXIS) 100 mg in 0.9% sodium chloride 130 mL IVPB  100 mg IntraVENous Q24H    heparin (porcine) injection 5,000 Units  5,000 Units SubCUTAneous Q8H    meropenem (MERREM) 1 g in 0.9% sodium chloride (MBP/ADV) 50 mL MBP  1 g IntraVENous Q8H    vancomycin (VANCOCIN) 1250 mg in  ml infusion  1,250 mg IntraVENous Q12H    metoclopramide HCl (REGLAN) injection 5 mg  5 mg IntraVENous Q6H    pantoprazole (PROTONIX) 40 mg in 0.9% sodium chloride 10 mL injection  40 mg IntraVENous DAILY    dexmedeTOMidine in 0.9 % NaCl (PRECEDEX) 400 mcg/100 mL (4 mcg/mL) infusion soln  0.1-1.5 mcg/kg/hr IntraVENous TITRATE    fentaNYL (PF) 1,500 mcg/30 mL (50 mcg/mL) infusion  0-200 mcg/hr IntraVENous TITRATE    propofol (DIPRIVAN) 10 mg/mL infusion  0-50 mcg/kg/min IntraVENous TITRATE    0.9% sodium chloride infusion  3 mL/hr IntraVENous CONTINUOUS    HYDROmorphone (DILAUDID) injection 1 mg  1 mg IntraVENous Q4H PRN    HYDROmorphone (DILAUDID) injection 0.5 mg  0.5 mg IntraVENous Q4H PRN    sodium chloride (NS) flush 5-40 mL  5-40 mL IntraVENous Q8H    sodium chloride (NS) flush 5-40 mL  5-40 mL IntraVENous PRN    bacitracin 500 unit/gram packet 1 Packet  1 Packet Topical PRN    0.45% sodium chloride infusion  10 mL/hr IntraVENous CONTINUOUS    naloxone (NARCAN) injection 0.4 mg  0.4 mg IntraVENous PRN    ondansetron (ZOFRAN) injection 4 mg  4 mg IntraVENous Q4H PRN    albuterol (PROVENTIL VENTOLIN) nebulizer solution 2.5 mg  2.5 mg Nebulization Q4H PRN    aspirin chewable tablet 81 mg  81 mg Oral DAILY    midazolam (VERSED) injection 1 mg  1 mg IntraVENous Q1H PRN    calcium chloride 1 g in 0.9% sodium chloride 250 mL IVPB  1 g IntraVENous PRN    polyethylene glycol (MIRALAX) packet 17 g  17 g Oral DAILY    ELECTROLYTE REPLACEMENT NOTE: Nurse to review Serum Potassium and Magnesuim levels and Initiate Electrolyte Replacement Protocol as needed  1 Each Other PRN    magnesium sulfate 1 g/100 ml IVPB (premix or compounded)  1 g IntraVENous PRN    glucagon (GLUCAGEN) injection 1 mg  1 mg IntraMUSCular PRN    lactated ringers bolus infusion 250 mL  250 mL IntraVENous Q1H PRN    dextrose 10 % infusion 0-250 mL  0-250 mL IntraVENous PRN    melatonin tablet 3-6 mg  3-6 mg Oral QHS PRN    EPINEPHrine (ADRENALIN) 5 mg in 0.9% sodium chloride 250 mL infusion  0-10 mcg/min IntraVENous TITRATE    lidocaine 4 % patch 2 Patch  2 Patch TransDERmal Q24H    insulin regular (NOVOLIN R, HUMULIN R) 100 Units in 0.9% sodium chloride 100 mL infusion  1-25 Units/hr IntraVENous TITRATE    LORazepam (ATIVAN) tablet 1 mg  1 mg Oral Q8H PRN    sertraline (ZOLOFT) tablet 100 mg  100 mg Oral DAILY    busPIRone (BUSPAR) tablet 15 mg  15 mg Oral TID     ______________________________________________________________________  EXPECTED LENGTH OF STAY: 1d 19h  ACTUAL LENGTH OF STAY:          [address] [facility] , [place]   Pulmonary/CCM  [address] [phone]

## 2022-12-01 ENCOUNTER — APPOINTMENT (OUTPATIENT)
Dept: GENERAL RADIOLOGY | Age: 42
End: 2022-12-01
Attending: PHYSICIAN ASSISTANT
Payer: COMMERCIAL

## 2022-12-01 LAB
ALBUMIN SERPL-MCNC: 2.4 G/DL (ref 3.5–5)
ALBUMIN SERPL-MCNC: 2.6 G/DL (ref 3.5–5)
ALBUMIN/GLOB SERPL: 0.6 {RATIO} (ref 1.1–2.2)
ALBUMIN/GLOB SERPL: 0.7 {RATIO} (ref 1.1–2.2)
ALP SERPL-CCNC: 366 U/L (ref 45–117)
ALP SERPL-CCNC: 385 U/L (ref 45–117)
ALT SERPL-CCNC: 518 U/L (ref 12–78)
ALT SERPL-CCNC: 591 U/L (ref 12–78)
ANION GAP SERPL CALC-SCNC: 11 MMOL/L (ref 5–15)
ANION GAP SERPL CALC-SCNC: 12 MMOL/L (ref 5–15)
ARTERIAL PATENCY WRIST A: ABNORMAL
AST SERPL-CCNC: 167 U/L (ref 15–37)
AST SERPL-CCNC: 252 U/L (ref 15–37)
BASE DEFICIT BLDA-SCNC: 0.7 MMOL/L
BASOPHILS # BLD: 0 K/UL (ref 0–0.1)
BASOPHILS NFR BLD: 0 % (ref 0–1)
BDY SITE: ABNORMAL
BILIRUB DIRECT SERPL-MCNC: 0.4 MG/DL (ref 0–0.2)
BILIRUB SERPL-MCNC: 0.9 MG/DL (ref 0.2–1)
BILIRUB SERPL-MCNC: 1.1 MG/DL (ref 0.2–1)
BUN SERPL-MCNC: 42 MG/DL (ref 6–20)
BUN SERPL-MCNC: 48 MG/DL (ref 6–20)
BUN/CREAT SERPL: 39 (ref 12–20)
BUN/CREAT SERPL: 40 (ref 12–20)
CALCIUM SERPL-MCNC: 8.6 MG/DL (ref 8.5–10.1)
CALCIUM SERPL-MCNC: 9.2 MG/DL (ref 8.5–10.1)
CHLORIDE SERPL-SCNC: 106 MMOL/L (ref 97–108)
CHLORIDE SERPL-SCNC: 109 MMOL/L (ref 97–108)
CMV DNA SERPL NAA+PROBE-ACNC: NEGATIVE IU/ML
CMV DNA SERPL NAA+PROBE-LOG IU: NORMAL LOG10 IU/ML
CO2 SERPL-SCNC: 24 MMOL/L (ref 21–32)
CO2 SERPL-SCNC: 28 MMOL/L (ref 21–32)
CREAT SERPL-MCNC: 1.08 MG/DL (ref 0.7–1.3)
CREAT SERPL-MCNC: 1.2 MG/DL (ref 0.7–1.3)
DIFFERENTIAL METHOD BLD: ABNORMAL
EBV NA IGG SER-ACNC: 93.9 U/ML (ref 0–17.9)
EBV VCA IGG SER-ACNC: 234 U/ML (ref 0–17.9)
EBV VCA IGM SER-ACNC: <36 U/ML (ref 0–35.9)
EOSINOPHIL # BLD: 1.4 K/UL (ref 0–0.4)
EOSINOPHIL NFR BLD: 6 % (ref 0–7)
ERYTHROCYTE [DISTWIDTH] IN BLOOD BY AUTOMATED COUNT: 15.6 % (ref 11.5–14.5)
FIO2 ON VENT: 40 %
GLOBULIN SER CALC-MCNC: 4 G/DL (ref 2–4)
GLOBULIN SER CALC-MCNC: 4 G/DL (ref 2–4)
GLUCOSE BLD STRIP.AUTO-MCNC: 123 MG/DL (ref 65–117)
GLUCOSE BLD STRIP.AUTO-MCNC: 198 MG/DL (ref 65–117)
GLUCOSE BLD STRIP.AUTO-MCNC: 304 MG/DL (ref 65–117)
GLUCOSE BLD STRIP.AUTO-MCNC: 92 MG/DL (ref 65–117)
GLUCOSE SERPL-MCNC: 113 MG/DL (ref 65–100)
GLUCOSE SERPL-MCNC: 287 MG/DL (ref 65–100)
HCO3 BLDA-SCNC: 23 MMOL/L (ref 22–26)
HCT VFR BLD AUTO: 30.6 % (ref 36.6–50.3)
HGB BLD-MCNC: 9.5 G/DL (ref 12.1–17)
IMM GRANULOCYTES # BLD AUTO: 0 K/UL (ref 0–0.04)
IMM GRANULOCYTES NFR BLD AUTO: 0 % (ref 0–0.5)
INTERPRETATION, 169995: ABNORMAL
LYMPHOCYTES # BLD: 1.4 K/UL (ref 0.8–3.5)
LYMPHOCYTES NFR BLD: 6 % (ref 12–49)
MAGNESIUM SERPL-MCNC: 1.8 MG/DL (ref 1.6–2.4)
MAGNESIUM SERPL-MCNC: 2.1 MG/DL (ref 1.6–2.4)
MCH RBC QN AUTO: 27.1 PG (ref 26–34)
MCHC RBC AUTO-ENTMCNC: 31 G/DL (ref 30–36.5)
MCV RBC AUTO: 87.4 FL (ref 80–99)
METAMYELOCYTES NFR BLD MANUAL: 2 %
MONOCYTES # BLD: 0.7 K/UL (ref 0–1)
MONOCYTES NFR BLD: 3 % (ref 5–13)
MYELOCYTES NFR BLD MANUAL: 2 %
NEUTS BAND NFR BLD MANUAL: 4 %
NEUTS SEG # BLD: 18.3 K/UL (ref 1.8–8)
NEUTS SEG NFR BLD: 77 % (ref 32–75)
NRBC # BLD: 0.05 K/UL (ref 0–0.01)
NRBC BLD-RTO: 0.2 PER 100 WBC
PCO2 BLDA: 34 MMHG (ref 35–45)
PEEP RESPIRATORY: 5 CM[H2O]
PH BLDA: 7.45 [PH] (ref 7.35–7.45)
PHOSPHATE SERPL-MCNC: 4.4 MG/DL (ref 2.6–4.7)
PLATELET # BLD AUTO: 293 K/UL (ref 150–400)
PMV BLD AUTO: 9.6 FL (ref 8.9–12.9)
PO2 BLDA: 62 MMHG (ref 80–100)
POTASSIUM SERPL-SCNC: 3.3 MMOL/L (ref 3.5–5.1)
POTASSIUM SERPL-SCNC: 4.2 MMOL/L (ref 3.5–5.1)
PRESSURE SUPPORT SETTING VENT: 5 CM[H2O]
PROCALCITONIN SERPL-MCNC: 0.72 NG/ML
PROT SERPL-MCNC: 6.4 G/DL (ref 6.4–8.2)
PROT SERPL-MCNC: 6.6 G/DL (ref 6.4–8.2)
RBC # BLD AUTO: 3.5 M/UL (ref 4.1–5.7)
RBC MORPH BLD: ABNORMAL
SAO2 % BLD: 93 % (ref 92–97)
SAO2% DEVICE SAO2% SENSOR NAME: ABNORMAL
SERVICE CMNT-IMP: ABNORMAL
SERVICE CMNT-IMP: NORMAL
SODIUM SERPL-SCNC: 142 MMOL/L (ref 136–145)
SODIUM SERPL-SCNC: 148 MMOL/L (ref 136–145)
SPECIMEN SITE: ABNORMAL
VANCOMYCIN SERPL-MCNC: 18.9 UG/ML
VENTILATION MODE VENT: ABNORMAL
WBC # BLD AUTO: 22.6 K/UL (ref 4.1–11.1)

## 2022-12-01 PROCEDURE — 65610000006 HC RM INTENSIVE CARE

## 2022-12-01 PROCEDURE — 94003 VENT MGMT INPAT SUBQ DAY: CPT

## 2022-12-01 PROCEDURE — 74011250637 HC RX REV CODE- 250/637: Performed by: INTERNAL MEDICINE

## 2022-12-01 PROCEDURE — 83735 ASSAY OF MAGNESIUM: CPT

## 2022-12-01 PROCEDURE — 74011000250 HC RX REV CODE- 250: Performed by: NURSE PRACTITIONER

## 2022-12-01 PROCEDURE — 74011000258 HC RX REV CODE- 258: Performed by: INTERNAL MEDICINE

## 2022-12-01 PROCEDURE — 74011250636 HC RX REV CODE- 250/636: Performed by: THORACIC SURGERY (CARDIOTHORACIC VASCULAR SURGERY)

## 2022-12-01 PROCEDURE — 82962 GLUCOSE BLOOD TEST: CPT

## 2022-12-01 PROCEDURE — 74011000250 HC RX REV CODE- 250: Performed by: INTERNAL MEDICINE

## 2022-12-01 PROCEDURE — 74011250637 HC RX REV CODE- 250/637: Performed by: NURSE PRACTITIONER

## 2022-12-01 PROCEDURE — 74011000250 HC RX REV CODE- 250: Performed by: ANESTHESIOLOGY

## 2022-12-01 PROCEDURE — 80076 HEPATIC FUNCTION PANEL: CPT

## 2022-12-01 PROCEDURE — 74011636637 HC RX REV CODE- 636/637: Performed by: HOSPITALIST

## 2022-12-01 PROCEDURE — 94667 MNPJ CHEST WALL 1ST: CPT

## 2022-12-01 PROCEDURE — 74011250636 HC RX REV CODE- 250/636: Performed by: INTERNAL MEDICINE

## 2022-12-01 PROCEDURE — 80202 ASSAY OF VANCOMYCIN: CPT

## 2022-12-01 PROCEDURE — 71045 X-RAY EXAM CHEST 1 VIEW: CPT

## 2022-12-01 PROCEDURE — C9113 INJ PANTOPRAZOLE SODIUM, VIA: HCPCS | Performed by: INTERNAL MEDICINE

## 2022-12-01 PROCEDURE — 74011250637 HC RX REV CODE- 250/637: Performed by: PHYSICIAN ASSISTANT

## 2022-12-01 PROCEDURE — 84100 ASSAY OF PHOSPHORUS: CPT

## 2022-12-01 PROCEDURE — 85025 COMPLETE CBC W/AUTO DIFF WBC: CPT

## 2022-12-01 PROCEDURE — 74011250636 HC RX REV CODE- 250/636: Performed by: NURSE PRACTITIONER

## 2022-12-01 PROCEDURE — 74011250637 HC RX REV CODE- 250/637: Performed by: HOSPITALIST

## 2022-12-01 PROCEDURE — 36415 COLL VENOUS BLD VENIPUNCTURE: CPT

## 2022-12-01 PROCEDURE — 74011000258 HC RX REV CODE- 258: Performed by: NURSE PRACTITIONER

## 2022-12-01 PROCEDURE — 99233 SBSQ HOSP IP/OBS HIGH 50: CPT | Performed by: CLINICAL NURSE SPECIALIST

## 2022-12-01 PROCEDURE — 80053 COMPREHEN METABOLIC PANEL: CPT

## 2022-12-01 PROCEDURE — 74011000258 HC RX REV CODE- 258: Performed by: THORACIC SURGERY (CARDIOTHORACIC VASCULAR SURGERY)

## 2022-12-01 PROCEDURE — 84145 PROCALCITONIN (PCT): CPT

## 2022-12-01 PROCEDURE — 82803 BLOOD GASES ANY COMBINATION: CPT

## 2022-12-01 RX ORDER — INSULIN GLARGINE 100 [IU]/ML
70 INJECTION, SOLUTION SUBCUTANEOUS EVERY 12 HOURS
Status: DISCONTINUED | OUTPATIENT
Start: 2022-12-01 | End: 2022-12-05

## 2022-12-01 RX ORDER — POTASSIUM CHLORIDE 29.8 MG/ML
20 INJECTION INTRAVENOUS ONCE
Status: COMPLETED | OUTPATIENT
Start: 2022-12-01 | End: 2022-12-01

## 2022-12-01 RX ORDER — LANOLIN ALCOHOL/MO/W.PET/CERES
6 CREAM (GRAM) TOPICAL ONCE
Status: DISCONTINUED | OUTPATIENT
Start: 2022-12-01 | End: 2022-12-01

## 2022-12-01 RX ORDER — MAGNESIUM SULFATE 1 G/100ML
1 INJECTION INTRAVENOUS ONCE
Status: COMPLETED | OUTPATIENT
Start: 2022-12-01 | End: 2022-12-01

## 2022-12-01 RX ORDER — INSULIN LISPRO 100 [IU]/ML
20 INJECTION, SOLUTION INTRAVENOUS; SUBCUTANEOUS ONCE
Status: COMPLETED | OUTPATIENT
Start: 2022-12-01 | End: 2022-12-01

## 2022-12-01 RX ORDER — NOREPINEPHRINE BITARTRATE/D5W 8 MG/250ML
.5-3 PLASTIC BAG, INJECTION (ML) INTRAVENOUS
Status: DISCONTINUED | OUTPATIENT
Start: 2022-12-01 | End: 2022-12-02 | Stop reason: ALTCHOICE

## 2022-12-01 RX ORDER — SCOLOPAMINE TRANSDERMAL SYSTEM 1 MG/1
1 PATCH, EXTENDED RELEASE TRANSDERMAL
Status: DISCONTINUED | OUTPATIENT
Start: 2022-12-01 | End: 2022-12-05

## 2022-12-01 RX ADMIN — SODIUM CHLORIDE 100 MG: 9 INJECTION, SOLUTION INTRAVENOUS at 12:29

## 2022-12-01 RX ADMIN — Medication 1 MCG/KG/HR: at 18:49

## 2022-12-01 RX ADMIN — Medication 1.4 MCG/KG/HR: at 05:45

## 2022-12-01 RX ADMIN — VANCOMYCIN HYDROCHLORIDE 1250 MG: 10 INJECTION, POWDER, LYOPHILIZED, FOR SOLUTION INTRAVENOUS at 16:55

## 2022-12-01 RX ADMIN — Medication 1 MCG/KG/HR: at 21:57

## 2022-12-01 RX ADMIN — FUROSEMIDE 10 MG/HR: 10 INJECTION, SOLUTION INTRAMUSCULAR; INTRAVENOUS at 13:33

## 2022-12-01 RX ADMIN — PHENYLEPHRINE HYDROCHLORIDE 20 MCG/MIN: 10 INJECTION INTRAVENOUS at 08:52

## 2022-12-01 RX ADMIN — BUSPIRONE HYDROCHLORIDE 15 MG: 5 TABLET ORAL at 13:59

## 2022-12-01 RX ADMIN — INSULIN GLARGINE 70 UNITS: 100 INJECTION, SOLUTION SUBCUTANEOUS at 23:37

## 2022-12-01 RX ADMIN — METOCLOPRAMIDE 5 MG: 5 INJECTION, SOLUTION INTRAMUSCULAR; INTRAVENOUS at 17:04

## 2022-12-01 RX ADMIN — HEPARIN SODIUM 5000 UNITS: 5000 INJECTION INTRAVENOUS; SUBCUTANEOUS at 23:39

## 2022-12-01 RX ADMIN — ASPIRIN 81 MG CHEWABLE TABLET 81 MG: 81 TABLET CHEWABLE at 08:51

## 2022-12-01 RX ADMIN — Medication 1.4 MCG/KG/HR: at 01:14

## 2022-12-01 RX ADMIN — MEROPENEM 1 G: 1 INJECTION, POWDER, FOR SOLUTION INTRAVENOUS at 09:07

## 2022-12-01 RX ADMIN — MAGNESIUM SULFATE HEPTAHYDRATE 1 G: 1 INJECTION, SOLUTION INTRAVENOUS at 20:08

## 2022-12-01 RX ADMIN — CASTOR OIL AND BALSAM, PERU: 788; 87 OINTMENT TOPICAL at 09:06

## 2022-12-01 RX ADMIN — INSULIN GLARGINE 70 UNITS: 100 INJECTION, SOLUTION SUBCUTANEOUS at 11:09

## 2022-12-01 RX ADMIN — POTASSIUM CHLORIDE 20 MEQ: 400 INJECTION, SOLUTION INTRAVENOUS at 17:37

## 2022-12-01 RX ADMIN — POLYETHYLENE GLYCOL 3350 17 G: 17 POWDER, FOR SOLUTION ORAL at 08:51

## 2022-12-01 RX ADMIN — Medication 10 UNITS: at 06:08

## 2022-12-01 RX ADMIN — PROPOFOL 10 MCG/KG/MIN: 10 INJECTION, EMULSION INTRAVENOUS at 08:50

## 2022-12-01 RX ADMIN — HYDROMORPHONE HYDROCHLORIDE 1 MG: 1 INJECTION, SOLUTION INTRAMUSCULAR; INTRAVENOUS; SUBCUTANEOUS at 13:59

## 2022-12-01 RX ADMIN — HYDROMORPHONE HYDROCHLORIDE 1 MG: 1 INJECTION, SOLUTION INTRAMUSCULAR; INTRAVENOUS; SUBCUTANEOUS at 18:46

## 2022-12-01 RX ADMIN — METOCLOPRAMIDE 5 MG: 5 INJECTION, SOLUTION INTRAMUSCULAR; INTRAVENOUS at 23:41

## 2022-12-01 RX ADMIN — BISACODYL 10 MG: 10 SUPPOSITORY RECTAL at 08:51

## 2022-12-01 RX ADMIN — SODIUM CHLORIDE, PRESERVATIVE FREE 10 ML: 5 INJECTION INTRAVENOUS at 21:06

## 2022-12-01 RX ADMIN — BUSPIRONE HYDROCHLORIDE 15 MG: 5 TABLET ORAL at 21:06

## 2022-12-01 RX ADMIN — Medication 1.4 MCG/KG/HR: at 03:32

## 2022-12-01 RX ADMIN — SODIUM CHLORIDE 40 MG: 9 INJECTION, SOLUTION INTRAMUSCULAR; INTRAVENOUS; SUBCUTANEOUS at 08:51

## 2022-12-01 RX ADMIN — HEPARIN SODIUM 5000 UNITS: 5000 INJECTION INTRAVENOUS; SUBCUTANEOUS at 16:55

## 2022-12-01 RX ADMIN — Medication 20 UNITS: at 11:08

## 2022-12-01 RX ADMIN — BUSPIRONE HYDROCHLORIDE 15 MG: 5 TABLET ORAL at 08:51

## 2022-12-01 RX ADMIN — LEVOTHYROXINE SODIUM 150 MCG: 0.07 TABLET ORAL at 05:50

## 2022-12-01 RX ADMIN — Medication 1 MCG/KG/HR: at 15:11

## 2022-12-01 RX ADMIN — SODIUM CHLORIDE, PRESERVATIVE FREE 10 ML: 5 INJECTION INTRAVENOUS at 15:12

## 2022-12-01 RX ADMIN — SERTRALINE 100 MG: 50 TABLET, FILM COATED ORAL at 08:51

## 2022-12-01 RX ADMIN — MEROPENEM 1 G: 1 INJECTION, POWDER, FOR SOLUTION INTRAVENOUS at 02:00

## 2022-12-01 RX ADMIN — SODIUM CHLORIDE, PRESERVATIVE FREE 10 ML: 5 INJECTION INTRAVENOUS at 05:50

## 2022-12-01 RX ADMIN — CASTOR OIL AND BALSAM, PERU: 788; 87 OINTMENT TOPICAL at 21:06

## 2022-12-01 RX ADMIN — Medication 1.4 MCG/KG/HR: at 10:53

## 2022-12-01 RX ADMIN — METOCLOPRAMIDE 5 MG: 5 INJECTION, SOLUTION INTRAMUSCULAR; INTRAVENOUS at 12:29

## 2022-12-01 RX ADMIN — SENNOSIDES AND DOCUSATE SODIUM 2 TABLET: 50; 8.6 TABLET ORAL at 08:51

## 2022-12-01 RX ADMIN — METOCLOPRAMIDE 5 MG: 5 INJECTION, SOLUTION INTRAMUSCULAR; INTRAVENOUS at 05:49

## 2022-12-01 RX ADMIN — PROPOFOL 20 MCG/KG/MIN: 10 INJECTION, EMULSION INTRAVENOUS at 04:37

## 2022-12-01 RX ADMIN — EPINEPHRINE 1 MCG/MIN: 1 INJECTION INTRAMUSCULAR; INTRAVENOUS; SUBCUTANEOUS at 01:13

## 2022-12-01 RX ADMIN — HEPARIN SODIUM 5000 UNITS: 5000 INJECTION INTRAVENOUS; SUBCUTANEOUS at 08:51

## 2022-12-01 RX ADMIN — POTASSIUM CHLORIDE 20 MEQ: 29.8 INJECTION, SOLUTION INTRAVENOUS at 18:46

## 2022-12-01 RX ADMIN — POTASSIUM CHLORIDE 20 MEQ: 29.8 INJECTION, SOLUTION INTRAVENOUS at 20:06

## 2022-12-01 RX ADMIN — Medication 1.4 MCG/KG/HR: at 08:52

## 2022-12-01 RX ADMIN — MEROPENEM 1 G: 1 INJECTION, POWDER, FOR SOLUTION INTRAVENOUS at 17:37

## 2022-12-01 RX ADMIN — VANCOMYCIN HYDROCHLORIDE 1250 MG: 10 INJECTION, POWDER, LYOPHILIZED, FOR SOLUTION INTRAVENOUS at 03:01

## 2022-12-01 NOTE — DIABETES MGMT
[facility] [facility]    CLINICAL NURSE SPECIALIST CONSULT     Initial Presentation   Lillian Sanford is a 36 y.o. male admitted 11/15/22 after experiencing chest pain. Afebrile. Hypertensive. 02 sats 100%  LAB: WBC 8.7. Normal H&H. /AG 3. Normal kidney & liver parameters. NT pro-; troponin 714  CXR:  Mild interstitial pulmonary edema versus interstitial pneumonia. No   pneumothorax. Consider PA and lateral chest views when the patient can better   tolerate. HX:   Past Medical History:   Diagnosis Date    Anxiety and depression     anxiety, depression    Bleeding of eye, left     8/17/21 pt reports receiving injections in right eye for beeding    Chronic headaches 2007    COVID-19 12/20/2020    Diabetes type 1, uncontrolled     since 8years old    GERD (gastroesophageal reflux disease)     Hypercholesterolemia     Hypertension     Hypothyroidism     MI (myocardial infarction) ([facility] [address]     as of 8/17/21 pt reports 9 stents total    WALLY on CPAP       INITIAL DX:   Acute non-Q wave non-ST elevation myocardial infarction (NSTEMI) ([facility] [address] [I21.4]  Aortic stenosis [I35.0]  CAD (coronary artery disease) [I25.10]     Current Treatment     TX: 11/22/22 On pump CORONARY ARTERY BYPASS GRAFT  X 1 WITH LEFT INTERNAL MAMMARY ARTERY to LAD  AORTIC VALVE REPLACEMENT WITH MEYER 25MM INSPIRIS RESILIA TISSUE VALVE   REPAIR OF ASCENDING AORTIC ANEURYSM with 26mm hemashield graft    Consulted by Provider for advanced diabetes nursing assessment and care for:   [x] Transitioning off Gutierrez Reasons   [x] Inpatient management strategy  [] Home management assessment  [] Survival skill education    Hospital Course   Clinical progress has been complicated by need for ICU level of care. 11/24/22 Overnight developed hypoxemia. Underwent bronch and SYLWIA, neither of which explained cause for hypoxemia. He required re-intubation and was put on veletri. 11/25/22 Intermittent desaturation events. CXR this a.m. with pulmonary edema  11/27/22 Concern for hepatic ischemia/infarction with progressive trnasaminitis. GI consulted determines severe hepatitis  11/29/22 Sedated on Precedex & Propofol. On C vent support Fi02 50%/Peep 8. Trying to transition off epi to baldev infusions. Will restart Tfs. NG 2 suction at the moment. Plan on diuresing  11/30/22 Follows commands with cough & gag+. On AC vent support Fi02 50%/Peep 8. On Fentanyl infusion; Propofol being weaned. BP managed with epi & baldev infusions. NG to suction & drawing 500cc over past day. KKUB revealed prominent fecal status. Receiving Miralax & Senna; no BM for 14 days. 12/1/22 Anxious. BP & HR up. Plan to extubate this morning. On Spon vent support Fi02 40%/Peep 5. Lots of oral secretions. Remains on baldev & epi infusions fo BP management. NG clamped. Will receive enema today for fecal stasis. 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 Max daily dose of 150 units.            Diabetes self-management practices:   Eating pattern - Eats 3 meals on most days and snacks in the evening   [x] Not eating a carbohydrate-controlled mealplan  Physical activity pattern   [x] Not employing a physical activity program to control BG  Monitoring pattern   [x] Testing BGs   [x] Breakfast 300s  [x] Lunch  100-200s  [x] Dinner  100-200s  Taking medications pattern  [x] Consistent administration  [x] Affordable  Overall evaluation:    [x] Not achieving A1c target with drug therapy & self-care practices    Subjective   Intubated     Objective   Physical exam  General Obese male who is improving. Anxious  Neuro  Alert & following commands  Vital Signs Visit Vitals  BP (!) 117/58   Pulse 78   Temp (!) 101.7 °F (38.7 °C)   Resp 24   Ht 5' 9\" (1.753 m)   Wt 114.7 kg (252 lb 13.9 oz)   SpO2 94%   BMI 37.34 kg/m²     Laboratory  Recent Labs     12/01/22  0248 11/30/22  1337 11/30/22  0433 11/29/22  1254 11/29/22  0451   * 105* 102* 115*  --    AGAP 12 6 9 6  --    WBC 22.6*  --  20.6*  --  17.2*   CREA 1.20 1.06 1.02 1.41*  --    *  --  376* 482*  --    *  --  659* 770*  --      Factors impacting BG management  Factor Dose Comments   Nutrition:  TF     NG clamped. Marked fecal statis   Drugs:  Vasopressor load  Atypical antipsychotics   Epi & baldev infusions  Buspar 3XB. Zoloft D   Affects insulin delivery     Pain Dilaudid infusion    Infection Merrem Q8 hrs  Eraxis Q24 hrs Fever.  WBC elevated   Other:   Kidney function  Liver function   SERGE resolved  AST/ALT elevated but trending down      Blood glucose pattern      Significant diabetes-related events over the past 24-72 hours  11/15/22 Admission   Total insulin requirements without achieving target Bgs until 11/20/22:    11/21/22 Started on Priscila Contras  11/23/22 Using 5-10 units/hr of insulin this morning  11/28/22 Using 2-6 units/hr of insulin  11/29/22 Continues to use 3-6 units/hr of insulin  11/30/22 Using 3.5 units/hr => transitioned off GS  12/1/22 Bgs rising into 200-300s    Assessment and Plan   Nursing Diagnosis Risk for unstable blood glucose pattern   Nursing Intervention Domain 2406 Decision-making Support   Nursing Interventions Examined current inpatient diabetes/blood glucose control   Explored factors facilitating and impeding inpatient management  Explored corrective strategies with patient and responsible inpatient provider   Informed patient of rational for insulin strategy while hospitalized     Evaluation   This 36year old  male with known CAD was admitted with chest pain; he underwent CABG & AVR. Patient has known Type 1 diabetes with poor control going into this surgery. Patient required 260 units/D prior to the surgery (during this admission) without consistently achieving BG targets. Has been on Glucostabilizer since surgery 11/22/22; was using 3-6 units/hour. Very ill. Remained on vent & pressor support. Abdominal distention related to feces with bowel regimen in place. Plan to extubate this morning. Transitioned off Priscila Contras with 100 units of insulin/D (but not at home dosing (per conservative desires of provider. During today's IPR, restarting home basal insulin dosing was agreed. Also recommended one-time dose of Humalog to get BG down <200mg/dl. Recommendations     [x] Increase Lantus insulin to 70 units twice daily    [x] Give 20 units of Humalog insulin now    Billing Code(s)   [x] 61466 IP subsequent hospital care - 35 minutes     Before making these care recommendations, I personally reviewed the hospitalization record, including notes, laboratory & diagnostic data and current medications, and examined the patient at the bedside (circumstances permitting) before making care recommendations. More than fifty (50) percent of the time was spent in patient counseling and/or care coordination.   Total minutes: Neli Schultz, CNS  Diabetes Clinical Nurse Specialist  [facility]  Access via [employer]

## 2022-12-01 NOTE — PROGRESS NOTES
Critical Care Progress Note  Erica Adams MD          Date of Service:  2022  NAME:  Buel Bumpers  :  1980  MRN:  [id number]      Subjective/Hospital course:      37 y/o male POD 1 from CABG x1 an AV valve replacement. He arrived intubated and on pressors. Extubated  on POD 0 @ 19:00. Noted to have SERGE with creatinine rise 2. Possibly secondary to hypotension in OR. Night of  was worked up for hypoxemia. This morning he tested positive for Covid. Case discussed with cT surgery. Most likely diagnosis is PE most likely secondary to hypercoaguable state from Covid. Pt had no respiratory symptoms preop or post op suggestive of URI      - Overnight developed hypoxemia. Pt had bronch and SYLWIA neither of which explained cause for hypoxemia. He required re-intubation and was put on veletri.  - intermittent desaturation events. CXR this a.m. with pulmonary edema   - fio2 improved   : fio2 and PEEP requirement has increased, still requiring epi at 2 mcg/min   : remains on epinephrine drip, sedated, fio2 requirement decreased to 80%'LFT's are trending   Down   : remains on epi drip, diueresed well with lasix drip,fio2 decreased to 50% and peep to 6  : Patient remains intubated and sedated. Being diuresed with lasix gtt. : Remains intubated, sedation is being weaned, able to follow commands. Assessment/Plan:     PULMONARY:  Acute hypoxic resopiratory failure   Covid rapid +, PCR negative  D dimer / LE doppler negative - unlikely PE  Pulmonary edema/ pleural effusions  Ct chest : no PE  Plan  - stop heparin - discussed with CTS  - Vent settings established, reviewed and/or adjusted as per orders  - Continue nebulized bronchodilators and steroids  - Continue diuresis as tolerated.     CARDIOVASCULAR/HEMODYNAMIC:  Continue epi drip       Plan    - ICU hemodynamic/cardiac monitoring  - MAP goal > 65 mmHg      RENAL:  SERGE    Plan  - Monitor I/Os and Uo  - Monitor renal function panel intermittently  - Correct electrolytes as needed  - Avoid nephrotoxic meds      GI/NUTRITION:  CT abdomen : normal liver vasculature  Hepatic steatosis : suspect cholestasis as the cause for the elevated LFT's  Continue reglan  Laxatives to move bowels  Trickle feeds when output less than 500    INFECTIOUS DISEASE  Covid ruled out with 2 negative PCR  Intermittent fevers  Cultures pending   Pro rosy is only slightly elevated    Micro:    Plan  - Continue broad spectrum abx   Add eraxis   ID consult  ? Non infectious     NEURO  Sedation for vent synchrony    ENDO : TSH is elevated  Increase synthroid to 150 mcg once a day    Plan    - Daily SAT when meets ICU criteria  - ABCDEF mobility bundle  - PT/OT involvement when appropriate          Care Plan discussed with: attending, CTS NP and bedside nurse, GI , radiology    I personally spent 40 minutes of critical care time. This is time spent at this critically ill patient's bedside actively involved in patient care as well as the coordination of care and discussions with the patient's family. This does not include any procedural time which has been billed separately. Review of Systems:   ROS   Intubated     Vital Signs:   Patient Vitals for the past 4 hrs:   Temp Pulse Resp SpO2   12/01/22 1148 -- -- -- 95 %   12/01/22 1130 (!) 100.8 °F (38.2 °C) 80 16 95 %   12/01/22 1030 (!) 100.6 °F (38.1 °C) 79 19 91 %   12/01/22 0930 (!) 100.8 °F (38.2 °C) 73 19 94 %          Intake/Output Summary (Last 24 hours) at 12/1/2022 1305  Last data filed at 12/1/2022 1234  Gross per 24 hour   Intake 3131.35 ml   Output 6375 ml   Net -3243.65 ml          Physical Examination:    Young, well built  HEENT: normal  Heart: s1,s2  Lungs: clear  Abd: distended, no bowel sounds  Ext: no edema  Cns: sedated     Labs and Imaging:   Reviewed.       Medications:     Current Facility-Administered Medications   Medication Dose Route Frequency scopolamine (TRANSDERM-SCOP) 1 mg over 3 days 1 Patch  1 Patch TransDERmal Q72H    insulin glargine (LANTUS) injection 70 Units  70 Units SubCUTAneous Q12H    insulin lispro (HUMALOG) injection   SubCUTAneous Q6H    glucose chewable tablet 16 g  4 Tablet Oral PRN    0.9% sodium chloride infusion 250 mL  250 mL IntraVENous PRN    PHENYLephrine (TUYET-SYNEPHRINE) 30 mg in 0.9% sodium chloride 250 mL infusion   mcg/min IntraVENous TITRATE    bisacodyL (DULCOLAX) suppository 10 mg  10 mg Rectal DAILY    senna-docusate (PERICOLACE) 8.6-50 mg per tablet 2 Tablet  2 Tablet Oral DAILY    levothyroxine (SYNTHROID) tablet 150 mcg  150 mcg Oral 6am    furosemide (LASIX) 200 mg in 0.9% sodium chloride 100 mL infusion  10 mg/hr IntraVENous CONTINUOUS    balsam peru-castor oiL (VENELEX) ointment   Topical BID    anidulafungin (ERAXIS) 100 mg in 0.9% sodium chloride 130 mL IVPB  100 mg IntraVENous Q24H    heparin (porcine) injection 5,000 Units  5,000 Units SubCUTAneous Q8H    meropenem (MERREM) 1 g in 0.9% sodium chloride (MBP/ADV) 50 mL MBP  1 g IntraVENous Q8H    vancomycin (VANCOCIN) 1250 mg in  ml infusion  1,250 mg IntraVENous Q12H    metoclopramide HCl (REGLAN) injection 5 mg  5 mg IntraVENous Q6H    pantoprazole (PROTONIX) 40 mg in 0.9% sodium chloride 10 mL injection  40 mg IntraVENous DAILY    dexmedeTOMidine in 0.9 % NaCl (PRECEDEX) 400 mcg/100 mL (4 mcg/mL) infusion soln  0.1-1.5 mcg/kg/hr IntraVENous TITRATE    fentaNYL (PF) 1,500 mcg/30 mL (50 mcg/mL) infusion  0-200 mcg/hr IntraVENous TITRATE    propofol (DIPRIVAN) 10 mg/mL infusion  0-50 mcg/kg/min IntraVENous TITRATE    0.9% sodium chloride infusion  3 mL/hr IntraVENous CONTINUOUS    HYDROmorphone (DILAUDID) injection 1 mg  1 mg IntraVENous Q4H PRN    HYDROmorphone (DILAUDID) injection 0.5 mg  0.5 mg IntraVENous Q4H PRN    sodium chloride (NS) flush 5-40 mL  5-40 mL IntraVENous Q8H    sodium chloride (NS) flush 5-40 mL  5-40 mL IntraVENous PRN bacitracin 500 unit/gram packet 1 Packet  1 Packet Topical PRN    0.45% sodium chloride infusion  10 mL/hr IntraVENous CONTINUOUS    naloxone (NARCAN) injection 0.4 mg  0.4 mg IntraVENous PRN    ondansetron (ZOFRAN) injection 4 mg  4 mg IntraVENous Q4H PRN    albuterol (PROVENTIL VENTOLIN) nebulizer solution 2.5 mg  2.5 mg Nebulization Q4H PRN    aspirin chewable tablet 81 mg  81 mg Oral DAILY    midazolam (VERSED) injection 1 mg  1 mg IntraVENous Q1H PRN    calcium chloride 1 g in 0.9% sodium chloride 250 mL IVPB  1 g IntraVENous PRN    polyethylene glycol (MIRALAX) packet 17 g  17 g Oral DAILY    ELECTROLYTE REPLACEMENT NOTE: Nurse to review Serum Potassium and Magnesuim levels and Initiate Electrolyte Replacement Protocol as needed  1 Each Other PRN    magnesium sulfate 1 g/100 ml IVPB (premix or compounded)  1 g IntraVENous PRN    glucagon (GLUCAGEN) injection 1 mg  1 mg IntraMUSCular PRN    lactated ringers bolus infusion 250 mL  250 mL IntraVENous Q1H PRN    dextrose 10 % infusion 0-250 mL  0-250 mL IntraVENous PRN    melatonin tablet 3-6 mg  3-6 mg Oral QHS PRN    EPINEPHrine (ADRENALIN) 5 mg in 0.9% sodium chloride 250 mL infusion  0-10 mcg/min IntraVENous TITRATE    lidocaine 4 % patch 2 Patch  2 Patch TransDERmal Q24H    LORazepam (ATIVAN) tablet 1 mg  1 mg Oral Q8H PRN    sertraline (ZOLOFT) tablet 100 mg  100 mg Oral DAILY    busPIRone (BUSPAR) tablet 15 mg  15 mg Oral TID     ______________________________________________________________________  EXPECTED LENGTH OF STAY: 1d 19h  ACTUAL LENGTH OF STAY:          Marcello Coleman MD   Pulmonary/CCM  [phone]

## 2022-12-01 NOTE — PROGRESS NOTES
Gastroenterology Daily Progress Note   LOLLY Rocha for Dr. Annemarie Steven)   [address] [place] [address] Dr Cerrato Date: 11/15/2022     Follow up of hepatitis    Subjective:       Weaning sedation in hopes of extubation today. Following commands. Agitated  On tuyet and epi  Tm 101.7  WBC up to 22.6, 4% bands, absolute eos elevated, cx neg so far  ID following  On eraxis, vancomycin and meopenam    EBV c/w prior infection  HSV negative   Acute hepatitis panel and hep B quant neg  CMV IgG and IgM neg  WENDY, AMA, ASMA neg  IgG panel not elevatd    INR stable at 1.2  T bili normal at 0.9  ALT improved to 591  AST improved to 252  Alk phos stable at 385    KUB: Mild colon distention with prominent fecal stasis. No significant small bowel  gas.      Getting dulcolax supp, miralax and pericolace    Current Facility-Administered Medications   Medication Dose Route Frequency    insulin glargine (LANTUS) injection 50 Units  50 Units SubCUTAneous Q12H    insulin lispro (HUMALOG) injection   SubCUTAneous Q6H    glucose chewable tablet 16 g  4 Tablet Oral PRN    0.9% sodium chloride infusion 250 mL  250 mL IntraVENous PRN    PHENYLephrine (TUYET-SYNEPHRINE) 30 mg in 0.9% sodium chloride 250 mL infusion   mcg/min IntraVENous TITRATE    bisacodyL (DULCOLAX) suppository 10 mg  10 mg Rectal DAILY    senna-docusate (PERICOLACE) 8.6-50 mg per tablet 2 Tablet  2 Tablet Oral DAILY    levothyroxine (SYNTHROID) tablet 150 mcg  150 mcg Oral 6am    furosemide (LASIX) 200 mg in 0.9% sodium chloride 100 mL infusion  10 mg/hr IntraVENous CONTINUOUS    balsam peru-castor oiL (VENELEX) ointment   Topical BID    anidulafungin (ERAXIS) 100 mg in 0.9% sodium chloride 130 mL IVPB  100 mg IntraVENous Q24H    heparin (porcine) injection 5,000 Units  5,000 Units SubCUTAneous Q8H    meropenem (MERREM) 1 g in 0.9% sodium chloride (MBP/ADV) 50 mL MBP  1 g IntraVENous Q8H    vancomycin (VANCOCIN) 1250 mg in  ml infusion  1,250 mg IntraVENous Q12H    metoclopramide HCl (REGLAN) injection 5 mg  5 mg IntraVENous Q6H    pantoprazole (PROTONIX) 40 mg in 0.9% sodium chloride 10 mL injection  40 mg IntraVENous DAILY    dexmedeTOMidine in 0.9 % NaCl (PRECEDEX) 400 mcg/100 mL (4 mcg/mL) infusion soln  0.1-1.5 mcg/kg/hr IntraVENous TITRATE    fentaNYL (PF) 1,500 mcg/30 mL (50 mcg/mL) infusion  0-200 mcg/hr IntraVENous TITRATE    propofol (DIPRIVAN) 10 mg/mL infusion  0-50 mcg/kg/min IntraVENous TITRATE    0.9% sodium chloride infusion  3 mL/hr IntraVENous CONTINUOUS    HYDROmorphone (DILAUDID) injection 1 mg  1 mg IntraVENous Q4H PRN    HYDROmorphone (DILAUDID) injection 0.5 mg  0.5 mg IntraVENous Q4H PRN    sodium chloride (NS) flush 5-40 mL  5-40 mL IntraVENous Q8H    sodium chloride (NS) flush 5-40 mL  5-40 mL IntraVENous PRN    bacitracin 500 unit/gram packet 1 Packet  1 Packet Topical PRN    0.45% sodium chloride infusion  10 mL/hr IntraVENous CONTINUOUS    naloxone (NARCAN) injection 0.4 mg  0.4 mg IntraVENous PRN    ondansetron (ZOFRAN) injection 4 mg  4 mg IntraVENous Q4H PRN    albuterol (PROVENTIL VENTOLIN) nebulizer solution 2.5 mg  2.5 mg Nebulization Q4H PRN    aspirin chewable tablet 81 mg  81 mg Oral DAILY    midazolam (VERSED) injection 1 mg  1 mg IntraVENous Q1H PRN    calcium chloride 1 g in 0.9% sodium chloride 250 mL IVPB  1 g IntraVENous PRN    polyethylene glycol (MIRALAX) packet 17 g  17 g Oral DAILY    ELECTROLYTE REPLACEMENT NOTE: Nurse to review Serum Potassium and Magnesuim levels and Initiate Electrolyte Replacement Protocol as needed  1 Each Other PRN    magnesium sulfate 1 g/100 ml IVPB (premix or compounded)  1 g IntraVENous PRN    glucagon (GLUCAGEN) injection 1 mg  1 mg IntraMUSCular PRN    lactated ringers bolus infusion 250 mL  250 mL IntraVENous Q1H PRN    dextrose 10 % infusion 0-250 mL  0-250 mL IntraVENous PRN    melatonin tablet 3-6 mg  3-6 mg Oral QHS PRN    EPINEPHrine (ADRENALIN) 5 mg in 0.9% sodium chloride 250 mL infusion  0-10 mcg/min IntraVENous TITRATE    lidocaine 4 % patch 2 Patch  2 Patch TransDERmal Q24H    LORazepam (ATIVAN) tablet 1 mg  1 mg Oral Q8H PRN    sertraline (ZOLOFT) tablet 100 mg  100 mg Oral DAILY    busPIRone (BUSPAR) tablet 15 mg  15 mg Oral TID        Objective:     Visit Vitals  BP (!) 117/58   Pulse 78   Temp (!) 101.7 °F (38.7 °C)   Resp 24   Ht 5' 9\" (1.753 m)   Wt 114.7 kg (252 lb 13.9 oz)   SpO2 94%   BMI 37.34 kg/m²   Blood pressure (!) 117/58, pulse 78, temperature (!) 101.7 °F (38.7 °C), resp. rate 24, height 5' 9\" (1.753 m), weight 114.7 kg (252 lb 13.9 oz), SpO2 94 %. No intake/output data recorded. 11/29 1901 - 12/01 0700  In: 4146.3 [I.V.:3876.3]  Out: 7470 [Urine:6870]      Intake/Output Summary (Last 24 hours) at 12/1/2022 0826  Last data filed at 12/1/2022 0700  Gross per 24 hour   Intake 2588.07 ml   Output 6025 ml   Net -3436.93 ml         Physical Exam:       General: acutely ill appearing young obese white male, intubated, agitated, thrashing in bed  HEENT: sclera anicteric, perrl  Chest:  CTA, No rhonchi, rales or rubs. Using accessory muscles  Heart: S1, S2, RRR  GI: distended, soft, +BS in RLQ only  Extremities: no edema  CNS: agitated      Labs:       Recent Results (from the past 24 hour(s))   GLUCOSE, POC    Collection Time: 11/30/22 10:17 AM   Result Value Ref Range    Glucose (POC) 102 (H) 65 - 100 mg/dL    Performed by Nallely Rosenberg    Collection Time: 11/30/22 10:19 AM   Result Value Ref Range    Glucose 102 mg/dL    Insulin order 3.2 units/hour    Insulin adminstered 3.2 units/hour    Multiplier 0.077     Low target 95 mg/dL    High target 130 mg/dL    D50 order 0.0 ml    D50 administered 0.00 ml    Minutes until next  min    Order initials af     Administered initials af    GLUCOSE, POC    Collection Time: 11/30/22 12:20 PM   Result Value Ref Range    Glucose (POC) 108 (H) 65 - 100 mg/dL    Performed by Ty Hook \"[place]\" RN    GLUCOSTABILIZER    Collection Time: 11/30/22 12:21 PM   Result Value Ref Range    Glucose 108 mg/dL    Insulin order 3.7 units/hour    Insulin adminstered 3.7 units/hour    Multiplier 0.077     Low target 95 mg/dL    High target 130 mg/dL    D50 order 0.0 ml    D50 administered 0.00 ml    Minutes until next  min    Order initials af     Administered initials af    METABOLIC PANEL, BASIC    Collection Time: 11/30/22  1:37 PM   Result Value Ref Range    Sodium 145 136 - 145 mmol/L    Potassium 3.8 3.5 - 5.1 mmol/L    Chloride 110 (H) 97 - 108 mmol/L    CO2 29 21 - 32 mmol/L    Anion gap 6 5 - 15 mmol/L    Glucose 105 (H) 65 - 100 mg/dL    BUN 43 (H) 6 - 20 MG/DL    Creatinine 1.06 0.70 - 1.30 MG/DL    BUN/Creatinine ratio 41 (H) 12 - 20      eGFR >60 >60 ml/min/1.73m2    Calcium 8.8 8.5 - 10.1 MG/DL   MAGNESIUM    Collection Time: 11/30/22  1:37 PM   Result Value Ref Range    Magnesium 2.2 1.6 - 2.4 mg/dL   PHOSPHORUS    Collection Time: 11/30/22  1:37 PM   Result Value Ref Range    Phosphorus 3.3 2.6 - 4.7 MG/DL   BLOOD GAS,CHEM8,LACTIC ACID POC    Collection Time: 11/30/22  2:34 PM   Result Value Ref Range    Calcium, ionized (POC) 1.11 (L) 1.12 - 1.32 mmol/L    pH (POC) 7.44 7.35 - 7.45      pCO2 (POC) 37.3 35.0 - 45.0 MMHG    pO2 (POC) 75 (L) 80 - 100 MMHG    BICARBONATE 26 mmol/L    Base excess (POC) 1.4 mmol/L    Sample source ARTERIAL      CO2, POC 25 (H) 19 - 24 MMOL/L    Sodium,  (H) 136 - 145 MMOL/L    Potassium, POC 3.1 (L) 3.5 - 5.5 MMOL/L    Chloride,  (H) 100 - 108 MMOL/L    Glucose, POC 87 74 - 106 MG/DL    Creatinine, POC 1.1 0.6 - 1.3 MG/DL    Lactic Acid (POC) 0.84 0.40 - 2.00 mmol/L   GLUCOSTABILIZER    Collection Time: 11/30/22  2:37 PM   Result Value Ref Range    Glucose 87 mg/dL    Insulin order 1.7 units/hour    Insulin adminstered 1.7 units/hour    Multiplier 0.062     Low target 95 mg/dL    High target 130 mg/dL    D50 order 0.0 ml    D50 administered 0.00 ml Minutes until next BG 60 min    Order initials af     Administered initials af    GLUCOSE, POC    Collection Time: 11/30/22  5:11 PM   Result Value Ref Range    Glucose (POC) 183 (H) 65 - 100 mg/dL    Performed by Claire Garza \"Vanessa\" RN    GLUCOSE, POC    Collection Time: 11/30/22 11:03 PM   Result Value Ref Range    Glucose (POC) 220 (H) 65 - 117 mg/dL    Performed by Juris Schilder RN    HEPATIC FUNCTION PANEL    Collection Time: 12/01/22  2:48 AM   Result Value Ref Range    Protein, total 6.4 6.4 - 8.2 g/dL    Albumin 2.4 (L) 3.5 - 5.0 g/dL    Globulin 4.0 2.0 - 4.0 g/dL    A-G Ratio 0.6 (L) 1.1 - 2.2      Bilirubin, total 0.9 0.2 - 1.0 MG/DL    Bilirubin, direct 0.4 (H) 0.0 - 0.2 MG/DL    Alk. phosphatase 385 (H) 45 - 117 U/L    AST (SGOT) 252 (H) 15 - 37 U/L    ALT (SGPT) 591 (H) 12 - 78 U/L   CBC WITH AUTOMATED DIFF    Collection Time: 12/01/22  2:48 AM   Result Value Ref Range    WBC 22.6 (H) 4.1 - 11.1 K/uL    RBC 3.50 (L) 4.10 - 5.70 M/uL    HGB 9.5 (L) 12.1 - 17.0 g/dL    HCT 30.6 (L) 36.6 - 50.3 %    MCV 87.4 80.0 - 99.0 FL    MCH 27.1 26.0 - 34.0 PG    MCHC 31.0 30.0 - 36.5 g/dL    RDW 15.6 (H) 11.5 - 14.5 %    PLATELET 783 531 - 861 K/uL    MPV 9.6 8.9 - 12.9 FL    NRBC 0.2 (H) 0  WBC    ABSOLUTE NRBC 0.05 (H) 0.00 - 0.01 K/uL    NEUTROPHILS 77 (H) 32 - 75 %    BAND NEUTROPHILS 4 %    LYMPHOCYTES 6 (L) 12 - 49 %    MONOCYTES 3 (L) 5 - 13 %    EOSINOPHILS 6 0 - 7 %    BASOPHILS 0 0 - 1 %    METAMYELOCYTES 2 %    MYELOCYTES 2 %    IMMATURE GRANULOCYTES 0 0.0 - 0.5 %    ABS. NEUTROPHILS 18.3 (H) 1.8 - 8.0 K/UL    ABS. LYMPHOCYTES 1.4 0.8 - 3.5 K/UL    ABS. MONOCYTES 0.7 0.0 - 1.0 K/UL    ABS. EOSINOPHILS 1.4 (H) 0.0 - 0.4 K/UL    ABS. BASOPHILS 0.0 0.0 - 0.1 K/UL    ABS. IMM.  GRANS. 0.0 0.00 - 0.04 K/UL    DF AUTOMATED      RBC COMMENTS ANISOCYTOSIS  1+       VANCOMYCIN, RANDOM    Collection Time: 12/01/22  2:48 AM   Result Value Ref Range    Vancomycin, random 18.9 UG/ML   PROCALCITONIN Collection Time: 12/01/22  2:48 AM   Result Value Ref Range    Procalcitonin 7.04 ng/mL   METABOLIC PANEL, BASIC    Collection Time: 12/01/22  2:48 AM   Result Value Ref Range    Sodium 142 136 - 145 mmol/L    Potassium 4.2 3.5 - 5.1 mmol/L    Chloride 106 97 - 108 mmol/L    CO2 24 21 - 32 mmol/L    Anion gap 12 5 - 15 mmol/L    Glucose 287 (H) 65 - 100 mg/dL    BUN 48 (H) 6 - 20 MG/DL    Creatinine 1.20 0.70 - 1.30 MG/DL    BUN/Creatinine ratio 40 (H) 12 - 20      eGFR >60 >60 ml/min/1.73m2    Calcium 8.6 8.5 - 10.1 MG/DL   MAGNESIUM    Collection Time: 12/01/22  2:48 AM   Result Value Ref Range    Magnesium 2.1 1.6 - 2.4 mg/dL   GLUCOSE, POC    Collection Time: 12/01/22  6:05 AM   Result Value Ref Range    Glucose (POC) 304 (H) 65 - 117 mg/dL    Performed by Pretty Cruz RN    LABRCNT(wbc:2,hgb:2,hct:2,plt:2,)  Recent Labs     12/01/22 0248 11/30/22  1337 11/30/22  0433    145 145   K 4.2 3.8 3.8    110* 109*   CO2 24 29 27   BUN 48* 43* 41*   CREA 1.20 1.06 1.02   * 105* 102*   CA 8.6 8.8 8.7   MG 2.1 2.2 2.3   PHOS  --  3.3  --    LABRCNT(sgot:3,gpt:3,ap:3,tbiL:3,TP:3,ALB:3,GLOB:3,ggt:3,aml:3,amyp:3,lpse:3,hlpse:3)  Recent Labs     11/30/22  0433 11/29/22  0345   INR 1.2* 1.3*   PTP 12.4* 12.9*     Recent Labs     12/01/22  0248 11/30/22  0433 11/29/22  1254   * 388* 445*   TP 6.4 6.0* 6.3*   ALB 2.4* 2.4* 2.7*   GLOB 4.0 3.6 3.6   BRIEFLAB(B12,FOL,FOLAT,RBCF)No results found for: FOL, RBCFLABRCNT(CPK:3,CpKMB:3,ckndx:3,troiq:3)No components found for: GLPOCBRIEFLAB(CHOL,CHOLX,CHOLP,CHLST,CHOLV,HDL,HDLC,HDLP,LDL,DLDL,LDLC,DLDLP,TGL,TGLX,TRIGL,TRIGP,CHHD,CHHDX)  Recent Labs     11/29/22  0816 11/28/22  0848   PH 7.43 7.39   PCO2 40 47*   PO2 86 84   LABRCNT(CPK:3,CpKMB:3,ckndx:3,troiq:3)LOLLY Priest  No results for input(s): CPK, CKNDX, TROIQ in the last 72 hours.     No lab exists for component: CPKMBMEShLOLLY Galvin      Problem List:     Active Problems:    Acute non-Q wave non-ST elevation myocardial infarction (NSTEMI) ([facility] [address] (11/15/2022)      Aortic stenosis (11/22/2022)      CAD (coronary artery disease) (11/22/2022)      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)      Overview: On pump CORONARY ARTERY BYPASS GRAFT  X 1 WITH LEFT INTERNAL MAMMARY       ARTERY to LAD      AORTIC VALVE REPLACEMENT WITH MEYER 25MM INSPIRIS RESILIA TISSUE VALVE       REPAIR OF ASCENDING AORTIC ANEURYSM with 26mm hemashield graft      S/P AVR (aortic valve replacement) and aortoplasty (11/22/2022)      Overview: On pump CORONARY ARTERY BYPASS GRAFT  X 1 WITH LEFT INTERNAL MAMMARY       ARTERY to LAD      AORTIC VALVE REPLACEMENT WITH MEYER 25MM INSPIRIS RESILIA TISSUE VALVE       REPAIR OF ASCENDING AORTIC ANEURYSM with 26mm hemashield graft  Assessment/Plan:  Mr. Aaron Bo is a 45yo with T2DM on insulin A1C 9-10, hypothyroidism on LT4, s/p on-pump CABG 11/22/2022 with tissue aortic valve replacement and ascending aortic aneurysm repair due to bicuspic AV, COVID positive rapid PCR negative 11/23, worsening hypoxemic respiratory failure s/p reintubation 11/24/2022 now with worsening PEEP and FiO2 requirements, severe SREGE 11/24 Cr of 2 now improved, with severe acute hepatitis that is improving. Very likely has baseline NAFLD. Serologies for other causes of liver injury are negative. Transaminases are improving. Bili is normal and INR is near normal.       Leukocytosis is worsening. Febrile again. KUB shows prominent fecal stasis but no SB dilation. Will try some tap water enemas. If no results, consider movantik as he is getting fentanyl. Also has hypothyroidism with low normal T4. NGT is clamped. <500cc output. Prognosis is guarded. Remains critically ill and is at high risk of decompensation if not death. Also sending alpha-gal antibodies to ensure no allergy against pig/lamb/cow. If positive, this could be catastrophic if no other cause of fevers or ileus are found. Recall:  Personally reviewed CT triple phase liver with Dr. Nura Duncan of interventional radiology 11/28. No hepatic vein dilation, no large clots, portal venous system intact and no clot, no hepatic infarct. This is good news. Reviewed all catheterization and operative notes since admission. US 11/27/2022 with hepatomegaly with diffuse echocenicity limited views, as well as splenomegaly. CT 4/2022 with normal appearing liver and spleen. 11/15/2022, and even September and April of 2022 and 12/2021 with normal LFTs. Since 11/22pm he has had progressive worsening of transaminitis in the 100-300range ALT, 260-350 AST with intermittent mild hyperbilirubinemia of 1.1-1.3.      11/26/2022 LFTs alb 2.7, Tb 1.2, , ,   11/27/2022 LFTs alb 2.7, Tb 1.1, ALT 1137, AST >2000,      He has had mild thrombmocytopenia ranging 144-183 throughout 2022 and the trough was 107 on 11/24. No INR since 11/22 and it was normal at 1.1. This is a critical hepatitis, but his liver function appears intact so far. Usually if ischemic from surgery, the peak is sooner after the event then downtrends. He did have some mild worsening hepatitis which peaked 11/24 and was down trending slowly. He has an acute critical hepatitis insult. Unlikely to be an acute viral infection, but will send studies. Main concern is an acute thrombosis or severe congestion now that he has splenomegaly and diffuse congestion in liver. Reviewed MAR at length for the past 10 days on day of initial consult. Minimal use of tylenol, as well as amiodarone started 11/23. He has been on continuous high-intensity statin rosuvastatin 40mg daily, recommend temporary holding of this. If possible, recommend holding amiodarone.      LOLLY Landry Asa    12/1/2022 [address] [facility], [address] [place] [address]  [id number] [address] [address] [place]: [phone]

## 2022-12-01 NOTE — PROGRESS NOTES
[facility] ICU Progress Note    Admit Date: 11/15/2022  POD:  9 Day Post-Op    Procedure:  Procedure(s):  SYLWIA BY DR Steve Hartley -  CORONARY ARTERY BYPASS GRAFT  X 1 WITH LEFT INTERNAL MAMMARY ARTERY GRAFTING - AORTIC VALVE REPLACEMENT WITH MEYER 25MM INSPIRIS RESILIA TISSUE VALVE AND REPAIR OF ASCENDING AORTIC ANEURYSM        Subjective/Interval:   Pt seen with Dr. Kisha Cruz. Pt intubated and sedated -- recheck pt is awake, eyes open, tracking and nodding appropriately after prop/fent off. On Vent 50% PEEP 8.  O2 sat downt o 87% overnight, PEEP increased from 5 to 8. Temp trending up this am, presently 101    On Epi @ 1, Raymond @ 25 and Dex gtts. Objective:   Vitals:  Blood pressure (!) 117/58, pulse 78, temperature (!) 101.7 °F (38.7 °C), resp. rate 24, height 5' 9\" (1.753 m), weight 252 lb 13.9 oz (114.7 kg), SpO2 94 %. Temp (24hrs), Av.7 °F (37.6 °C), Min:98.1 °F (36.7 °C), Max:101.7 °F (38.7 °C)      EKG/Rhythm:  NSR 60-80s    NGT outout: 200mL in 24hrs - clamp today per Dr. Kisha Cruz. Ventilator:  Ventilator Volumes  Vt Set (ml): 450 ml (22)  Vt Exhaled (Machine Breath) (ml): 475 ml (22)  Vt Spont (ml): 455 ml (22)  Ve Observed (l/min): 9.8 l/min (22)    Oxygen Therapy:  Oxygen Therapy  O2 Sat (%): 94 % (22)  Pulse via Oximetry: 78 beats per minute (22)  O2 Device: Ventilator (22)  Skin Assessment: Clean, dry, & intact (22)  Skin Protection for O2 Device: Yes (22)  Orientation: Anterior;Bilateral (22 1800)  Location: Cheek (11/30/22 2041)  Interventions: Skin Barrier (22 1800)  O2 Flow Rate (L/min): 6 l/min (22 1800)  FIO2 (%): 50 % (22 08)    CXR  CXR Results  (Last 48 hours)                 22 05  XR CHEST PORT Final result    Impression:  Interval removal of [place]-Yuly catheter. No other changes.        Narrative:  EXAM:  XR CHEST PORT       INDICATION: Postop heart COMPARISON: November 30       TECHNIQUE: portable chest AP view       FINDINGS interval removal of [place]-Yuly catheter. Other support devices are   stable. The inspiration is shallow with minimal vascular prominence. 11/30/22 0525  XR CHEST PORT Final result    Impression:  No significant change. Narrative:  INDICATION:  post op       EXAM: CXR Portable. FINDINGS: Portable chest shows satisfactory support lines/devices without   significant change since yesterday. There is no apparent pneumothorax. Lungs   show unchanged haziness. Heart size is stable. There is no new finding. Admission Weight: Last Weight   Weight: 257 lb 15 oz (117 kg) Weight: 252 lb 13.9 oz (114.7 kg)     Intake / Output / Drain:  Current Shift: 12/01 0701 - 12/01 1900  In: -   Out: 500 [Urine:500]  Last 24 hrs.:   Intake/Output Summary (Last 24 hours) at 12/1/2022 0916  Last data filed at 12/1/2022 4455  Gross per 24 hour   Intake 2588.07 ml   Output 6075 ml   Net -3486.93 ml     EXAM:  General: NAD, calm   Lungs:    Diminished to auscultation bilaterally   Incision:  Incision clean, dry and intact and prineo intact   Heart:   Regular rate and rhythm  and no murmurs, rubs or gallops   Abdomen:    Distended, soft, hypoactive bowel sounds, and obese. Extremities:  Trace edema BLE, BUE   Neurologic:  Opens eyes spontaneously, tracking, nodding appropriately, moving all extremities. Labs:   Recent Labs     12/01/22  0605 12/01/22  0248 11/30/22  0556 11/30/22  0433   WBC  --  22.6*  --  20.6*   HGB  --  9.5*  --  9.0*   HCT  --  30.6*  --  28.9*   PLT  --  293  --  279   NA  --  142   < > 145   K  --  4.2   < > 3.8   BUN  --  48*   < > 41*   CREA  --  1.20   < > 1.02   GLU  --  287*   < > 102*   GLUCPOC 304*  --    < >  --    INR  --   --   --  1.2*    < > = values in this interval not displayed.         Assessment:     Active Problems:    Acute non-Q wave non-ST elevation myocardial infarction (NSTEMI) ([facility] [address] (11/15/2022)      Aortic stenosis (11/22/2022)      CAD (coronary artery disease) (11/22/2022)      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)      Overview: On pump CORONARY ARTERY BYPASS GRAFT  X 1 WITH LEFT INTERNAL MAMMARY       ARTERY to LAD      AORTIC VALVE REPLACEMENT WITH MEYER 25MM INSPIRIS RESILIA TISSUE VALVE       REPAIR OF ASCENDING AORTIC ANEURYSM with 26mm hemashield graft      S/P AVR (aortic valve replacement) and aortoplasty (11/22/2022)      Overview: On pump CORONARY ARTERY BYPASS GRAFT  X 1 WITH LEFT INTERNAL MAMMARY       ARTERY to LAD      AORTIC VALVE REPLACEMENT WITH MEYER 25MM INSPIRIS RESILIA TISSUE VALVE       REPAIR OF ASCENDING AORTIC ANEURYSM with 26mm hemashield graft       Plan/Recommendations/Medical Decision Making:     Severe symptomatic AS, s/p tissue AVR. Continue ASA. CAD, STEMI, s/p CABG. Continue ASA. Holding amio, statin for transaminitis. Consider BB when off pressors. repeat Echo without effusion or tamponade, NL LV/RVF. Cont Raymond gtt for BP management, keep Epi @ 1   Acute hypoxic respiratory failure:developed hypoxia at approximately 11pm on 11/24 requiring Bipap and was ultimately re-intubated at 0200 for acute respiratory failure. Bronchoscopy was completed 11/24 and unremarkable. SYLWIA completed 11/24 w normal RV and LVF w/o effusion or shunting. CTA chest w/o PE, heparin gtt stopped. FiO2 down to 50%. Peep changed to 6 this am. Will complete SAT and attempt SBT if pt calm/o2 sats maintain  SERGE: Crt up to 2.01. Nephrology consult appreciated, avoid nephrotoxins, trend. Crt 1.2 this am. Cont lasix gtt @ 10mg/hr  Acute blood loss anemia: H&H 9.5/30.6 this am  Transaminitis. Acute Severe Hepatitis, GI consult appreciated, patient now with hepatomegaly and splenomegaly. Viral panel sent. Triple phase liver scan completed yesterday. No hepatic of splenic ischemia noted. + hepatic steatosis. Labs cont to trend down. Leukocytosis: BC, Sputum and urine cultures NGTD. Seen by ID, Abx changed to Merrem and Vanco. Eraxis started by ICU, fungal cx NGTD. WBC up to 22.6, febrile again this am. Procal cont to trend down, 0.72 this am, lactic 0.84 yesterday. HTN. On ACEi, BB PTA; resume BB when appropriate. Hypotensive, on minimal baldev   HLD. holding statin. WALLY , not on CPAP. Was unable to tolerate due to anxiety; he has been working on this with Sleep Medicine. OP follow-up. DMII. On Toujeo at home. Repeat A1C 9.0. Diabetes management following. Insulin gtt stopped by ICU yesterday, BG up to 304 this am. Pharmacy to increase lantus, if this does not bring BG < 200 pt will need to go back on Insulin gtt. Hypothyroidism. On Synthroid PTA; continue. Repeat TSH 1.96. GERD. Continue PPI. Anxiety and depression. On Buspar, Zoloft; continue. Supportive care. Nutrition: holding feeds at this time per Surgery recs, see below  Ileus:Increase schedule bowel regimen. Remains on reglan per ICU. per Gen Surg \"Recommend continue NGT to suction and consider TPN per nutritionist recommendations. Would continue NGT decompression until daily outputs below 500 cc and then could attempt trickle feeds. \". NGT with 200mL out, clamp today per Dr. Shellie Anton     DVT ppx- Heparin subcu  Dispo- remain in ICU.  Goal of the day to Extubate      Signed By: Yasmine Rodriguez NP

## 2022-12-01 NOTE — PROGRESS NOTES
Comprehensive Nutrition Assessment    Type and Reason for Visit: Reassess    Nutrition Recommendations/Plan:   Once medically able, begin TF via NG - Start TwoCal @ 10 mL/hr; advance 10 mL q12 hours to goal of 50 mL/hr + ProSource daily + 50 mL flushes q4 hours (provides 2440 kcals/112 g Pro/263 g CHO/1140 mL). Monitor lytes daily - risk for refeeding. Malnutrition Assessment:  Malnutrition Status: Moderate malnutrition (12/01/22 1243)    Context:  Acute illness     Findings of the 6 clinical characteristics of malnutrition:   Energy Intake:  50% or less of est energy requirements for 5 or more days  Weight Loss:  Unable to assess     Body Fat Loss:  Unable to assess,     Muscle Mass Loss:  Unable to assess,    Fluid Accumulation:  Mild, Extremities - all 2+   Strength:  Not performed     Nutrition Assessment:     12/1 Chart reviewed and case discussed in CCU rounds. Med noted for NSTEMI s/p CABG x1. Pt is off fentanyl and propofol. Plan is for pt to be extubated today and have enema (last BM 11/15). Trophic tube feeds held due to high NG output and abdominal distention earlier this week. GI following; imaging shows fecal stasis and NG output is decreased, anticipate TF initiation in the next 24 hours. Recommend starting TF after extubation, see recommendations above (meets 111% kcal needs and 100% protein needs). Will keep flushes minimal due to diureses via Lasix and continue to monitor. Attempted to visit pt but he was working with respiratory therapist. Due to minimal nutrition over the last several days, pt is at risk for refeeding.  Will continue to monitor TF tolerance, lytes, and GRV closely.  ]  Wt Readings from Last 15 Encounters:   12/01/22 114.7 kg (252 lb 13.9 oz)   11/15/22 116.6 kg (257 lb)   09/14/22 115.7 kg (255 lb)   09/02/22 116.6 kg (257 lb 1.6 oz)   08/10/22 118.8 kg (261 lb 12.8 oz)   07/25/22 118 kg (260 lb 3.2 oz)   07/20/22 117.4 kg (258 lb 12.8 oz)   07/18/22 118.6 kg (261 lb 6.4 oz)   07/16/22 117.3 kg (258 lb 9.6 oz)   06/29/22 117.6 kg (259 lb 3.2 oz)   06/13/22 117 kg (258 lb)   05/26/22 115.2 kg (254 lb)   05/02/22 116.1 kg (256 lb)   04/28/22 117.4 kg (258 lb 12.8 oz)   04/14/22 116.4 kg (256 lb 9.9 oz)   ]    Nutrition Related Findings:    Labs: BUN 48, -304, , , Alk phos 385   Meds: Eraxis, Heparin, ASA, Dulcolax, Miralax, Pericolace, Buspar, Precedex, Epi, Raymond, Lasix, Lantus, Humalog, Synthroid, Reglan, Protonix, Vancomycin  BM: 11/15  Edema: LUE/RUE/LLE/RLE-2+  Wound Type: Surgical incision    Current Nutrition Intake & Therapies:  Average Meal Intake: NPO  Average Supplement Intake: None ordered      Anthropometric Measures:  Height: 5' 9\" (175.3 cm)  Ideal Body Weight (IBW): 160 lbs (73 kg)     Current Body Wt:  114.7 kg (252 lb 13.9 oz), 158 % IBW. Bed scale  Current BMI (kg/m2): 37.3        Weight Adjustment: No adjustment                 BMI Category: Obese class 2 (BMI 35.0-39. 9)    Estimated Daily Nutrient Needs:  Energy Requirements Based On: Formula  Weight Used for Energy Requirements: Current  Energy (kcal/day): MSJ 2236 (BMR x1.1 AF)  Weight Used for Protein Requirements: Ideal  Protein (g/day): 110 g (1.5 g/kg IBW)  Method Used for Fluid Requirements: 1 ml/kcal  Fluid (ml/day): 1900 mL or per MD    Nutrition Diagnosis:   Inadequate protein-energy intake related to impaired respiratory function, altered GI function as evidenced by NPO or clear liquid status due to medical condition  Previous dx resolving    Nutrition Interventions:   Food and/or Nutrient Delivery: Start tube feeding  Nutrition Education/Counseling: No recommendations at this time  Coordination of Nutrition Care: Continue to monitor while inpatient, Interdisciplinary rounds       Goals:  Previous Goal Met: Progressing toward goal(s)  Goals: Meet at least 75% of estimated needs, by next RD assessment (GRV and terrance WNL)       Nutrition Monitoring and Evaluation: Behavioral-Environmental Outcomes: None identified  Food/Nutrient Intake Outcomes: Enteral nutrition intake/tolerance  Physical Signs/Symptoms Outcomes: Biochemical data, Nutrition focused physical findings, Skin, Weight, GI status, Fluid status or edema    Discharge Planning:     Too soon to determine    [place]:

## 2022-12-01 NOTE — PROGRESS NOTES
0700 bedside report received from [facility]. 0800 assessment completed, see flowsheet. CTS rounds with Dr Ana M Godfrey and Dr Lew Rubin. Plans to extubate today. Weaning sedation and ventilator settings as tolerated. 1000 patient is awake and following commands. Placed on SBT     1100 repeat ABG checked, reviewed with Dr Gregorio Archibald and Dr. Ana M Godfrey. Order to extubate. Patient is awake and following commands. 1135 extubated to [facility] ,Patient tolerated well.     1500 spoke with Dr Ana M Godfrey order to check CMP and repeat per ordered protocol. Ok to turn off epi per Md.     1600 tap water enema given per order. Large loose bowel movement     1800 mother at bedside, updated. Swabs provided to wet mouth, however educated patient and family the safety concerns and risk of aspiration. 1900 bedside report given to [facility] and [employer].

## 2022-12-01 NOTE — PROGRESS NOTES
Pharmacy Automatic Renal Dosing Protocol - Antimicrobials    Indication for Antimicrobials: sepsis     Current Regimen of Each Antimicrobial:   Vancomycin - pharmacy to dose; started ; day 7  Meropenem 1 gm IV q8h; started ; day 4  Anidulafungin 100 mg q24h; started ; day 4    Previous Antimicrobial Therapy:   Periop cefazolin and vanc   Metronidazole 500 mg IV q12h; started 11/25 x 4 days  Cefepime 2g IV q8h ; started 11/25 x 4 days     Goal Level: VANCOMYCIN TROUGH GOAL RANGE  Vancomycin Trough: 15 - 20 mcg/mL  (AUC: 400 - 600 mg/hr/Liter/day)     Date Dose & Interval Measured (mcg/mL) Extrapolated (mcg/mL)    S/p load 8 -    1000 mg q12h 9.9 430    03:45 1250 mg q12h 19.1 18.41 /     2:48 1250 mg q12h 18.9      Dosing calculator used: Excel-based calculator (poor model fit with [employer])    Significant Cultures:    blood cx NGTD    Sputum cx (ETT aspirate) NGTD   blood cx NGTD    blood for fungus - NGTD     Paralysis, amputations, malnutrition: -    Labs:  Recent Labs     22  0248 22  1337 22  0433 22  1254 22  0451   CREA 1.20 1.06 1.02   < >  --    BUN 48* 43* 41*   < >  --    WBC 22.6*  --  20.6*  --  17.2*    < > = values in this interval not displayed. Temp (24hrs), Av.7 °F (37.6 °C), Min:98.1 °F (36.7 °C), Max:101.7 °F (38.7 °C)    Creatinine Clearance (mL/min) or Dialysis: 79.4 mL/min (IBW)    Impression/Plan:   Continue vancomycin 1250 mg IV q12h for a predicted AUC of 580. Recommend next Vanc level on 12/3 with am labs  Continue meropenem and anidulafungin as above - may be deescalating, ID following  Antimicrobial stop date pending     Pharmacy will follow daily and adjust medications as appropriate for renal function and/or serum levels.     Thank you,  Korin Cantu, PHARMD

## 2022-12-01 NOTE — ADT AUTH CERT NOTES
Coronary Artery Bypass Graft (CABG) - Care Day 16 (11/30/2022) by Dwight Peralta       Review Entered Review Status   12/1/2022 1245 Completed      Criteria Review      Care Day: 16 Care Date: 11/30/2022 Level of Care: ICU    Guideline Day 3    Level Of Care    ( ) Intermediate care    12/1/2022 12:45:43 EST by Dwight Peralta      ICU    Clinical Status    (X) * Dangerous arrhythmia absent    12/1/2022 12:45:43 EST by Deondre Cunningham: Normal sinus rhythm    ( ) * Pain absent or managed    12/1/2022 12:45:43 EST by Dwight Peralta      Behavioral pain scale of 3    Activity    ( ) * Minimal ambulatory activity    12/1/2022 12:45:43 EST by Adalberto, 1920 [employer] sedated and intubated    Routes    (X) * Oral medications    12/1/2022 12:45:43 EST by Dwight Peralta      Buspar 15mg PO TID, Synthroid 125mcg PO q6AM, Miralax 17g PO qd, Pericolace 2 tab PO qd, Zoloft 100mg PO qd    ( ) * Advance diet    12/1/2022 12:45:43 EST by Dwight Peralta      NGT output still over 500mL  -> Will hold trickle feeds until reduced output    (X) IV fluids    12/1/2022 12:45:43 EST by Dwight Peralta      NS 3 mL/hr IV    Interventions    (X) * Chest tube absent    12/1/2022 12:45:43 EST by Dwight Peralta      Chest tube not indicated    ( ) * Central lines absent    12/1/2022 12:45:43 EST by Dwight Peralta      Right Central Venous Catheter, Double Lumen    ( ) * Pacing wires absent    12/1/2022 12:45:43 EST by Dwight Peralta      2 atrial wires, 1 bipolar ventricular wire    Medications    (X) * Epidural analgesics absent    12/1/2022 12:45:43 EST by Dwight Peralta      Epidural analgesics not indicated    ( ) Prophylactic antibiotics discontinued    12/1/2022 12:45:43 EST by Dwight Peralta      Eraxis 100mg IV q24h, Merrem 1g IV q8h, Vancomycin 1250mg IV q12h    (X) Aspirin    12/1/2022 12:45:43 EST by Dwight Peralta      Aspirin 81mg PO qd    * Milestone   Additional Notes   DATE: 11/30/22      Pertinent Updates:   -Desat after suction to 88%, RT increased PEEP up to 10 FiO2 to 60% from 50% the titrated down to 50 % at am, sat at 94%, Raymond titrated off, has some frequent PACs thet drop his MAP to less than 65 mmHg > EPi up to 3 ritu/min then back to 2 ritu/min.   -Leukocytosis is worsening. No further fevers after antifungal added.   -He has no bowel sounds appreciable on my exam and significant NGT output. Recommend bowel rest and TPN. Will get a KUB to assess ileus.    -Also sending alpha-gal antibodies to ensure no allergy against pig/lamb/cow. If positive, this could be catastrophic if no other cause of fevers or ileus are found. -NGT output still over 500mL   -Follows commands with cough & gag+. On AC vent support Fi02 50%/Peep 8. On Fentanyl infusion; Propofol being weaned. BP managed with epi & raymond infusions. NG to suction & drawing 500cc over past day. KKUB revealed prominent fecal status. Receiving Miralax & Senna; no BM for 14 days plans to repeat KUB and keep NGT to suction.    -Temp rising        Vitals:   101.5 °F (38.6 °C) 94 104/50 23 92% Endotracheal tube;Ventilator      Abnl/Pertinent Labs/Radiology/Diagnostic Studies:   WBC: 20.6 (H)   NRBC: 0.6 (H)   RBC: 3.33 (L)   HGB: 9.0 (L)   HCT: 28.9 (L)   RDW: 15.7 (H)   NEUTROPHILS: 83 (H)   LYMPHOCYTES: 7 (L)   MONOCYTES: 4 (L)   ABSOLUTE NRBC: 0.12 (H)   ABS. NEUTROPHILS: 17.2 (H)   ABS. EOSINOPHILS: 1.2 (H)   INR: 1.2 (H)   Prothrombin time: 12.4 (H)   Chloride: 109 (H), 110 (H)   Glucose: 102 (H), 105 (H)   BUN: 41 (H), 42 (H)    BUN/Creatinine ratio: 40 (H), 41 (H)   Bilirubin, direct: 0.3 (H)   Protein, total: 6.0 (L)   Albumin: 2.4 (L)   A-G Ratio: 0.7 (L)   ALT: 659 (H)   AST: 376 (H)   Alk.  phosphatase: 388 (H)   Sodium (POC): 147 (H)   Potassium (POC): 3.1 (L)   Chloride, POC: 110 (H)   CO2, POC: 25 (H)   Calcium, ionized (POC): 1.11 (L)   pO2 (POC): 75 (L)   GLUCOSE,FAST - POC: 104, 121 (H), 107, 102 (H), 108 (H), 87, 183 (H), 220 (H)      CXR: FINDINGS: Portable chest shows satisfactory support lines/devices without   significant change since yesterday. There is no apparent pneumothorax. Lungs   show unchanged haziness. Heart size is stable. There is no new finding. IMPRESSION:   No significant change. XR ABD (KUB): FINDINGS: Mild colon distention with fecal stasis. No significant small bowel   gas. IMPRESSION   Prominent fecal stasis. Physical Exam:   General: Intubated, sedated. Lungs: Coarse to auscultation bilaterally   Incision: Incision clean, dry and intact and prineo intact   Heart: Regular rate and rhythm  and no murmurs, rubs or gallops   Abdomen: Distended, soft, hypoactive bowel sounds, and obese. Extremities: Trace edema BLE, BUE   Neurologic: Intubated, sedated. MD Consults/Assessments & Plans:   CRITICAL CARE MED:   I. PULMONARY:   1. Acute hypoxic resopiratory failure       a. Covid rapid +, PCR negative      b. D dimer / LE doppler negative - unlikely PE   2. Pulmonary edema/ pleural effusions      a. Ct chest : no PE      Plan:   - stop heparin    - Vent settings established, reviewed and/or adjusted as per orders   - Continue nebulized bronchodilators and steroids   - Continue diuresis as tolerated. II. CARDIOVASCULAR/HEMODYNAMIC:   Continue epi drip    Current vasopressors: Epi 3 mcg/min      Plan:   - Cont lasix drip   - ICU hemodynamic/cardiac monitoring   - MAP goal > 65 mmHg       III. RENAL:   3. SERGE       Plan:   - Monitor I/Os and Uo   - Monitor renal function panel intermittently   - Correct electrolytes as needed   - Avoid nephrotoxic meds       IV. GI/NUTRITION:   CT abdomen : normal liver vasculature   Hepatic steatosis : suspect cholestasis as the cause for the elevated LFT's   Continue reglan   Laxatives to move bowels   Trickle feeds when output less than 500      V. INFECTIOUS DISEASE:   4. Covid ruled out with 2 negative PCR   5. Intermittent fevers     a.  Cultures pending - Pro rosy is only slightly elevated      Plan:   - Cont broad spectrum abx    - Add eraxis   ? Non infectious      VI. NEURO   Sedation for vent synchrony      VII. ENDO   TSH is elevated   Increase synthroid to 150 mcg once a day       Plan:   - Daily SAT when meets ICU criteria   - ABCDEF mobility bundle   - PT/OT involvement when appropriate      CARDIOTHORACIC SURGERY:   Acute hypoxemic respiratory failure   Fio2 down to 50% and PEEP 10 - paO2 86 at these settings on repeat ABG   Had been down to 50% and PEEP 5 yesterday, but derecruited and hypoxic to 80s yesterday evening   Back up to PEEP 10 overnight   Will gradually wean today   Lighten sedation simultaneously   Restart lasix gtt at 10 to get negative       Severe acute hepatitis   LFTs continue to trend down   Will continue to optimize perfusion and keep aggressive diuresis       Sepsis / Fever of unknown origin   WBC continues to climb - 25 today - but fevers waning   Repeat Blood cx NGTD; procalcitonin not significantly elevated   On vanc meropenem flagyl now eraxsis -  will start to taper as he improves       Ileus   NGT output still over 500mL  -> Will hold trickle feeds until reduced output per general surgery suggestions       CARDIOTHORACIC SURGERY (NP):   1. Severe symptomatic AS, s/p tissue AVR. Continue ASA. 2. CAD, STEMI, s/p CABG. Continue ASA. Holding amio, statin for transaminitis. Consider BB when off pressors. repeat Echo without effusion or tamponade, NL LV/RVF, CI maintaining > 2. Add Raymond gtt for BP management, wean Epi  to 1 today. Remove [place]   3. Acute hypoxic respiratory failure: heparin gtt stopped. FiO2 down to 45%, PEEP down to 10. Goal to get PEEP down today - take down to 8 today, 5 tomorrow   4. SERGE: Crt up to 2.01. Avoid nephrotoxins, trend. Crt down today at 1.03. Increase lasix gtt back to 10mg/hr   5. Acute blood loss anemia: H&H 9/28.9 this am   6. Transaminitis.  Acute Severe Hepatitis, patient now with hepatomegaly and splenomegaly. Viral panel sent. Triple phase liver scan completed yesterday. No hepatic of splenic ischemia noted. + hepatic steatosis. Labs cont to trend down. 7. Leukocytosis: BC, Sputum and urine cultures NTD. Seen by ID, Abx changed to Merrem and Vanco. Eraxis started by ICU, fungal cx pending. WBC back up to 20.6, afebrile now without cooling blanket/ice packs. Will add procal for tomorrows labs   8. HTN. On ACEi, BB PTA; resume BB when appropriate. Hypotensive, on minimal baldev    9. HLD. holding statin. 10. WALLY , not on CPAP. Was unable to tolerate due to anxiety; he has been working on this with Sleep Medicine. OP follow-up. 11. DMII. On Toujeo at home. Repeat A1C 9.0- Insulin gtt per protocol. 12. Hypothyroidism. On Synthroid PTA; continue. Repeat TSH 1.96.    13. GERD. Continue PPI. 14. Anxiety and depression. On Buspar, Zoloft; continue. Supportive care. 15. Nutrition: holding feeds at this time per Surgery recs   16. Ileus:Increase schedule bowel regimen. Remains on reglan per ICU. per Gen Surg \"Recommend continue NGT to suction and consider TPN per nutritionist recommendations. Would continue NGT decompression until daily outputs below 500 cc and then could attempt trickle feeds. \". hopefully start trickle feeds tomorrow, canister with 500mL this am per nursing. DVT ppx- Heparin subcu   Dispo- remain in ICU. Wean Epi to 1. D/c [place]. Wean PEEP to 5. GASTROENTEROLOGY:   Awaiting serologies for other causes of liver injury. Needs daily INR and LFTs   With fever 102.4, awaiting Bcx. Peripheral smear with erythroleukamoid rxn nonspecific but no hemolysis. Given absent bowel sounds, would continue to hold TF's and check KUB   Prognosis is guarded. Remains critically ill and is at high risk of decompensation if not death. INFECTIOUS DISEASE:   Fever curve better   LfT better   ? Inflammatory etiology causing fevers    ?  Congestion in biliary tree/liver causing elevations vs meds        ID workup for cultures in progress       Hopeful de escalation of antimicrobials based on course. On empiric Vancomycin, Meropenem, Eraxis       Likely stop vancomycin and Eraxis first if no + cultures to support use        [facility] ID to follow from 12/2/22      DIABETES MANAGEMENT TEAM:   Patient Has been on Will Song since surgery 11/22/22; was using 3-6 units/hour. Very ill. Remains on vent & pressor support. Abdominal distention is related to feces with bowel regimen in place. Desire is to transition off Will Song; has been using  units of total insulin over the past 3 days. PLAN:   Using 3.5 units/hr    Transition off Will Song with Lantus insulin 50 units twice daily       PHARMACIST:   Indication for Antimicrobials: sepsis    11/25 blood cx pending   11/25 Sputum cx (ETT aspirate) NGTD, pending    11/27 blood cx NGTD pending   11/28 blood for fungus - pending       Impression/Plan:    -Continue vancomycin 1250 mg IV q12h for a predicted AUC of 584.     -Next Vanc level on 12/1 with am labs   -Continue meropenem and anidulafungin as above      Medications:   Dulcolax 10mg RE qd, Precedex 1.4 mcg/kg/hr IV titrate, Adrenalin 1.2-3 mcg/min IV titrate, Fentanyl 50-25 mcg/hr IV titrate, Lasix 10 mg/hr IV, Heparin 5000 units SC q8h, SSI SC 3 units x1, Lidocaine 4% 2 patch TD q24h, Reglan 5mg IV q6h, Protonix 40mg IV qd, PHENYLephrine 15-50mcg/min IV titrate, Propofol 15-20 mcg/kg/min IV titrate, KCL 20 mEq IV once, Lantus 50 units SC q12h, Insulin regular 3-4.7 units/hr IV titrate            Coronary Artery Bypass Graft (CABG) - Care Day 15 (11/29/2022) by Brea Wilkins       Review Entered Review Status   12/1/2022 1052 Completed      Criteria Review      Care Day: 15 Care Date: 11/29/2022 Level of Care: ICU    Guideline Day 3    Level Of Care    ( ) Intermediate care    12/1/2022 10:52:16 EST by Brea Wilkins      ICU    Clinical Status    (X) * Dangerous arrhythmia absent    12/1/2022 10:52:16 EST by Kimani Flores: Normal sinus rhythm    ( ) * Pain absent or managed    12/1/2022 10:52:16 EST by Abdirahman Blake      Behavioral pain scale of 3    Activity    ( ) * Minimal ambulatory activity    12/1/2022 10:52:16 EST by Adalberto, [address] [employer] sedated and intubated    Routes    (X) * Oral medications    12/1/2022 10:52:16 EST by Abdirahman Blake      Buspar 15mg PO TID, Zoloft 100mg PO qd, Synthroid 125mcg PO q6AM    ( ) * Advance diet    12/1/2022 10:52:16 EST by Abdirahman Blake      Trickle feeds when output less than 500    Interventions    (X) * Chest tube absent    12/1/2022 10:52:16 EST by Abdirahman Blake      Chest tube not indicated    ( ) * Central lines absent    12/1/2022 10:52:16 EST by Abdirahman Blake      Right Central Venous Catheter, Double Lumen    ( ) * Pacing wires absent    12/1/2022 10:52:16 EST by Abdirahman Blake      2 atrial wires, 1 bipolar ventricular wire    Medications    (X) * Epidural analgesics absent    12/1/2022 10:52:16 EST by Abdirahman Blake      None    ( ) Prophylactic antibiotics discontinued    12/1/2022 10:52:16 EST by Abdirahman Blake      Eraxis 100mg IV q24h, Merrem 1g IV q8h, Vancomycin 1250mg IV q12h    (X) Aspirin    12/1/2022 10:52:16 EST by Abdirahman Blake      Aspirin 81mg PO qd    * Milestone   Additional Notes   DATE: 11/29/22      Pertinent Updates:   -On Vent 50% PEEP 10. Tmax 102.6, cooling blanket needs to be placed under pt due to fever.    -On Epi @ 3.,05 Insulin and gtts, Dex/ Fent/ Prop gtts for sedation. Trying to transition off epi to baldev infusions. Will restart Tfs. NG 2 suction at the moment. Plan on diuresing.   -Leukocytosis is worsening.   -Awaiting serologies for other causes of liver injury.   -Noted findings of petechiae on fingers, examination saw them but were on the finger pads concerning for glucose check needles rather than embolic phenomena from surgery, he didn't have any on his toes. Certainly having microemboli to the liver could cause the profound transaminitis. Vitals:   102.7 °F (39.3 °C) 101 140/54 20 96% Endotracheal tube;Ventilator      Abnl/Pertinent Labs/Radiology/Diagnostic Studies:   WBC: 17.2 (H)   NRBC: 0.6 (H)   RBC: 3.28 (L)   HGB: 8.9 (L)   HCT: 27.9 (L)   RDW: 15.7 (H)   NEUTROPHILS: 77 (H)   ABSOLUTE NRBC: 0.10 (H)   ABS. NEUTROPHILS: 13.5 (H)   INR: 1.3 (H)   Prothrombin time: 12.9 (H)   Glucose: 115 (H)   BUN: 47 (H)   Creatinine: 1.41 (H)   BUN/Creatinine ratio: 33 (H)   Calcium: 8.3 (L)   Bilirubin, total: 1.3 (H)   Bilirubin, direct: 0.5 (H)   Protein, total: 6.3 (L)   Albumin: 2.7 (L)   A-G Ratio: 0.8 (L)   ALT: 770 (H)   AST: 482 (H)   Alk. phosphatase: 445 (H)   Vancomycin,trough: 19.1 (H)   CO2, POC: 27 (H), 27 (H)   Creatinine, POC: 1.6 (H), 1.6 (h)   Calcium, ionized (POC): 1.06 (L), 1.06 (L)   pO2 (POC): 105 (H), 67 (L)   GLUCOSE,FAST - POC: 127 (H), 123 (H), 121 (H), 133 (H), 137 (H), 124 (H), 117 (H), 114 (H), 110 (H), 115 (H), 101 (H), 111 (H), 104      DARIUS BARR VIRUS AB PANEL:   EBV Ab VCA, Ig.0 (H)   EBV Nuclear Antigen Ab, IgG: Pending       Sputum Cx: Pending      CXR:   CXR shows satisfactory support lines/devices without   significant change since yesterday. There is no apparent pneumothorax. Lungs   show stable bibasilar nonspecific haziness. Heart size is stable. There is no   overt pulmonary edema. IMPRESSION:   No significant change. Physical Exam:   General: Intubated, sedated. Lungs: Coarse to auscultation bilaterally   Incision: Incision clean, dry and intact and prineo intact   Heart: Regular rate and rhythm  and no murmurs, rubs or gallops   Abdomen: Distended, firm, hypoactive bowel sounds, and obese. Extremities: +1-2 pitting edema BLE, BUE   Neurologic: Intubated, sedated. MD Consults/Assessments & Plans:   CRITICAL CARE MED:   I. PULMONARY:   1. Acute hypoxic resopiratory failure       a.  Covid rapid +, PCR negative      b. D dimer / LE doppler negative - unlikely PE   2. Pulmonary edema/ pleural effusions      a. Ct chest : no PE      Plan:   - stop heparin    - Vent settings established, reviewed and/or adjusted as per orders   - Continue nebulized bronchodilators and steroids   - change to lasix 40 mg Q 12 hrs      II. CARDIOVASCULAR/HEMODYNAMIC:   Continue epi drip    Current vasopressors: Epi 3 mcg/min      Plan:   - Cont lasix drip   - ICU hemodynamic/cardiac monitoring   - MAP goal > 65 mmHg       III. RENAL:   3. SERGE       Plan:   - Monitor I/Os and Uo   - Monitor renal function panel intermittently   - Correct electrolytes as needed   - Avoid nephrotoxic meds       IV. GI/NUTRITION:   CT abdomen : normal liver vasculature   Hepatic steatosis : suspect cholestasis as the cause for the elevated LFT's   Continue reglan   Laxatives to move bowels   Trickle feeds when output less than 500      V. INFECTIOUS DISEASE:   4. Covid ruled out with 2 negative PCR   5. Intermittent fevers     a. Cultures pending        - Pro rosy is only slightly elevated      Plan:   - Cont broad spectrum abx    - Add eraxis   ? Non infectious      VI. NEURO   Sedation for vent synchrony      VII. ENDO   TSH is elevated   Increase synthroid to 150 mcg once a day       Plan:   - Daily SAT when meets ICU criteria   - ABCDEF mobility bundle   - PT/OT involvement when appropriate      CARDIOVASCULAR SURGERY:   Would DC lines and wean vent and follow clinically at this point. EF is well preservedl.       CARDIOTHORACIC SURGERY:   Acute hypoxemic respiratory failure   Fio2 down to 50% and PEEP 10 - paO2 86 at these settings on repeat ABG   CTA neg for PE (will stop hep gtt)   SYLWIA normal, repeat TTE OK; no shunt   Small bilateral effusions   Negative fluid balance with lasix gtt yesterday; will try IV bid diuresis today   D/w intensivist - will continue to wean PEEP        Severe acute hepatitis   LFTs trending down significantly   Adequate perfusion, normal lactate   SYLWIA with normal biv function and normal AVR prosthesis   Negative CTA for thrombus   Will continue to optimize perfusion and keep aggressive diuresis       Sepsis / Fever of unknown origin   Continues to have persistent fevers   Repeat Blood cx NGTD; procalcitonin not significantly elevated   On vanc meropenem flagyl now eracsis   Discussed over telephone yesterday with Dr. Lidia Najera who changed to Meropenem       Ileus   Will hold trickle feeds until reduced OG output per general surgery suggestions   Suppository today       CARDIOTHORACIC SURGERY (NP):   1. Severe symptomatic AS, s/p tissue AVR. Continue ASA. 2. CAD, STEMI, s/p CABG. Continue ASA. Holding amio, statin for transaminitis. Consider BB when off pressors. repeat Echo without effusion or tamponade, NL LV/RVF, CI maintaining > 2 for last 24hrs, remains hypotensive. Will Add Raymond gtt for BP management, wean Epi as tolerated today. If able to come off Epi by end of day can likely remove [place]. Remove CT today. 3. Acute hypoxic respiratory failure: FiO2 down to 50%, PEEP down to 10. Goal to get PEEP down today   4. Acute kidney injury. Crt up to 2.01. Avoid nephrotoxins, trend. Crt slightly today after CTA to 1.31. lasix gtt stopped and received albumin overnight for hypotension, changed to BID IVP, monitor UOP. Reassess CMP mid afternoon. 5. Acute blood loss anemia: H&H 8.9/27.9 this am   6. Transaminitis. Acute Severe Hepatitis, patient now with hepatomegaly and splenomegaly. Viral panel sent. Triple phase liver scan completed yesterday. No hepatic of splenic ischemia noted. + hepatic steatosis. Labs cont to trend down. 7. Leukocytosis. WBC 20.6, up to 17.2 this am. febrile, Tmax 102.6, cont cooling blanket - under pt. BC, Sputum and urine cultures NTD. Seen by ID yesterday, Abx changed to Merrem and Vanco. Eraxis started by ICU, fungal cx pending. Procal down again today at 1.48   8. HTN.  On ACEi, BB PTA; resume BB when appropriate. Hypotensive, starting Raymond today   9. HLD. holding statin. 10. WALLY , not on CPAP. Was unable to tolerate due to anxiety; he has been working on this with Sleep Medicine. OP follow-up. 11. DMII. On Toujeo at home. Repeat A1C 9.0- Insulin gtt per protocol. 12. Hypothyroidism. On Synthroid PTA; continue. Repeat TSH 1.96.    13. GERD. Continue PPI. 14. Anxiety and depression. On Buspar, Zoloft; continue. Supportive care. 15. Nutrition: holding feeds at this time    16. Ileus: Increase schedule bowel regimen. Remains on reglan per ICU. Gen Surg \"Recommend continue NGT to suction and consider TPN per nutritionist recommendations. Would continue NGT decompression until daily outputs below 500 cc and then could attempt trickle feeds. \"      GENERAL SURGERY:   Acute non-Q wave non-ST elevation myocardial infarction   Aortic stenosis    CAD    S/P CABG x 1   S/P AVR       PLAN:   CT unremarkable postsurgical findings   Still with significant NG o/p   Recommend continue NGT to suction and consider TPN per nutritionist recommendations. Would continue NGT decompression until daily outputs below 500 cc and then could attempt trickle feeds. GASTROENTEROLOGY:   Awaiting serologies for other causes of liver injury. Needs daily INR and LFTs   With fever 102.4, awaiting Bcx. Peripheral smear with erythroleukamoid rxn nonspecific but no hemolysis. Prognosis is guarded. Remains critically ill and is at high risk of decompensation if not death.       INFECTIOUS DISEASE:   Persistently febrile    LFT better    On empiric broad coverage    Vancomycin, meropenem eraxis    Could possibly entertain doxycyline for atypical coverage but his fevers, lft elevation worsened after admission and doubt tick born issues/atypical infection as leading cause of his fevers/LFT elevation    Inflammatory/congestion likely also palying a role in his fevers/labs abnormalities   Peripheral smear noted    Changed abx to meropenem yesterday -- would give it another 24-48 hours before making more changes to see if there is cause/effect   Antibiotic deescalation based on course       DIABETES MANAGEMENT TEAM:   Patient has known Type 1 diabetes with poor control going into this surgery. Patient required 260 units/D prior to the surgery (during this admission) without consistently achieving BG targets. Has been on Glucostabilizer since surgery 11/22/22; using 3-6 units/hour. Very ill. Remains on vent & pressor support. PLAN:   Continues to use 3-6 units/hr of insulin   Glucostabilizer       PHARMACIST:   Indication for Antimicrobials: sepsis    11/25 blood cx pending   11/25 Sputum cx (ETT aspirate) NGTD, pending    11/27 blood cx NGTD pending   11/28 blood for fungus - pending       Impression/Plan:    -The vancomycin level resulted at 19.1 mcg/ml.  Continue vancomycin 1250 mg IV q12h for a predicted AUC of 584.    -Continue meropenem and anidulafungin       Medications:   Precedex 1.1-1.4 mcg/kg/hr IV titrate, Adrenalin 2-4 mcg/min IV titrate, Fentanyl 100-50 mcg/hr IV titrate, Lasix 5 mg/hr IV, Heparin 5000 units SC q8h, Lidocaine 4% 2 patch TD q24h, Reglan 5mg IV q6h, Protonix 40mg IV qd, PHENYLephrine 30-5mcg/min IV titrate, Propofol 10-50 mcg/kg/min IV titrate, Buminate 12.5g IV once x2, Ca Gluconate 1000 mg IV once, Lasix 10mg/hr IV, Lasix 40mg IV q8h, Insulin regular 5.9-1.8 units/hr IV titrate            Coronary Artery Bypass Graft (CABG) - Care Day 14 (11/28/2022) by Donna Silva       Review Entered Review Status   11/29/2022 1139 Completed      Criteria Review      Care Day: 14 Care Date: 11/28/2022 Level of Care: ICU    Guideline Day 3    Level Of Care    ( ) Intermediate care    11/29/2022 11:39:31 EST by Donna Silva      ICU    Clinical Status    (X) * Dangerous arrhythmia absent    11/29/2022 11:39:31 EST by Donna Silva      EKG:  Normal sinus rhythm   Poor R-wave Progression   Nonspecific T wave abnormality   When compared with ECG of 25-NOV-2022 13:47,   No significant change    ( ) * Pain absent or managed    11/29/2022 11:39:31 EST by Allegra Rui      Behavioral pain scale of 3    Activity    ( ) * Minimal ambulatory activity    11/29/2022 11:39:31 EST by Adalberto, 1920 [employer] sedated and intubated    Routes    (X) * Oral medications    11/29/2022 11:39:31 EST by Allegra Rui      Buspar 15mg PO TID, Zoloft 100mg PO qd, Synthroid 125mcg PO q6AM    ( ) * Advance diet    11/29/2022 11:39:31 EST by Allegra Rui      NPO    Interventions    (X) * Chest tube absent    11/29/2022 11:39:31 EST by Allegra [facility]      Chest tube not indicated    ( ) * Central lines absent    11/29/2022 11:39:31 EST by [facility]      Right Central Venous Catheter, Double Lumen    ( ) * Pacing wires absent    11/29/2022 11:39:31 EST by Allegra Rui      2 atrial wires, 1 bipolar ventricular wire    (X) Cardiac rehabilitation    11/29/2022 11:39:31 EST by [facility]      Smoking history assessed. Patient is a former smoker. Smoking Cessation Program link has not been added to the AVS.    Medications    (X) * Epidural analgesics absent    11/29/2022 11:39:31 EST by Allegra Rui      None    ( ) Prophylactic antibiotics discontinued    11/29/2022 11:39:31 EST by Adalberto, [address] 1g IV now, Vancomycin 1250mg IV q12h, Eraxis 200mg IV once, Maxipime 2g IV q8h, Flagyl 500mg IV q12h    (X) Aspirin    11/29/2022 11:39:31 EST by Allegra Pal      Aspirin 81mg PO qd    * Milestone   Additional Notes   DATE: 11/28/22      Pertinent Updates:   -On Vent 80% PEEP 12. Tmax 102.4, down to 100 this am off cooling blanket at present time.    -On Epi @ 3, Lasix @ 10mg/hr, Insulin and Heparin gtts, Dex @ 1, Fent @ 100, Prop @ 25    -The patient is requiring titration of his epinephrine due to blood pressure. His cardiac index is over 2.5. Will give albumin X 1. Lasix held.    -BP still low with an index of 2.9. Patient received another dose of albumin. Cooling blanket placed on the patient for hyperthermia. Vitals:   101.3 °F (38.5 °C) 73 94/48 20 94% Endotracheal tube;Ventilator      Abnl/Pertinent Labs/Radiology/Diagnostic Studies:   WBC: 16.5 (H)   NRBC: 0.4 (H)   RBC: 3.36 (L)   HGB: 9.2 (L)   HCT: 28.0 (L)   RDW: 15.6 (H)   BASOPHILS: 2 (H)   ABSOLUTE NRBC: 0.06 (H)   ABS. NEUTROPHILS: 12.1 (H)   ABS. EOSINOPHILS: 0.8 (H)   ABS. BASOPHILS: 0.3 (H)   INR: 1.3 (H)   Prothrombin time: 13.6 (H)   Glucose: 126 (H)   BUN: 44 (H)   BUN/Creatinine ratio: 36 (H)   Calcium: 8.4 (L)   Bilirubin, total: 1.5 (H)   Bilirubin, direct: 0.6 (H)   Protein, total: 6.2 (L)   Albumin: 2.5 (L)   Globulin: 4.1 (H)   A-G Ratio: 0.6 (L)   ALT: 1,020 (H)   AST: 707 (H)   Alk. phosphatase: 455 (H)   TSH: 6.70 (H)   PCO2: 47 (H)   BICARBONATE: 27 (H)   Immunoglobulin G, Qt. 510 (L)   IgG, Subclass 1: 220 (L)   Blood Cx for Fungus: PENDING   GLUCOSE,FAST - POC: 134 (H), 126 (H), 123 (H), 114, 122 (H), 124 (H), 113, 131 (H), 118 (H), 106, 118 (H), 99      CXR:   No significant change      CT ABD PELV/CTA CHEST W WO CONT:   IMPRESSION:   1. Poststernotomy changes in the mediastinum. Aortic valve replacement. 2. Bilateral small pleural effusions and dependent atelectasis. 3. No evidence of splenic infarction or enhancing hepatic masses. 4. Hepatic steatosis. Physical Exam:   HEENT: normal   Heart: s1,s2   Lungs: clear   Abd: distended, no bowel sounds   Ext: no edema   Cns: sedated        MD Consults/Assessments & Plans:   CRITICAL CARE MED:   I. PULMONARY:   1. Acute hypoxic resopiratory failure       a. Covid rapid +, PCR negative      b. D dimer / LE doppler negative - unlikely PE   2. Pulmonary edema/ pleural effusions      a.  Ct chest : no PE      Plan:   - stop heparin    - Vent settings established, reviewed and/or adjusted as per orders   - Continue nebulized bronchodilators and steroids   - cont lasix drip today      II. CARDIOVASCULAR/HEMODYNAMIC:   Continue epi drip    Current vasopressors: Epi 5       Plan:   - Cont lasix drip   - ICU hemodynamic/cardiac monitoring   - MAP goal > 65 mmHg       III. RENAL:   3. SERGE       Plan:   - Monitor I/Os and Uo   - Monitor renal function panel intermittently   - Correct electrolytes as needed   - Avoid nephrotoxic meds       IV. GI/NUTRITION:   CT abdomen : normal liver vasculature   Hepatic steatosis : suspect cholestasis as the cause for the elevated LFT's      V. INFECTIOUS DISEASE:   4. Covid ruled out with 2 negative PCR   5. Intermittent fevers     a. Cultures pending        - Pro rosy is only slightly elevated      Plan:   - Cont broad spectrum abx    - Add eraxis      VI. NEURO   Sedation for vent synchrony   VII. ENDO : obtain thyroid function tests       Plan:   - Daily SAT when meets ICU criteria   - ABCDEF mobility bundle   - PT/OT involvement when appropriate      CARDIOTHORACIC SURGERY:   Acute hypoxemic respiratory failure   Fio2 to 70%  PEEP 12   CTA neg for PE (will stop hep gtt)   SYLWIA normal, repeat TTE OK; no shunt   Bronch performed on night of reintubation without plug, secretions   Small bilateral effusions   Negative fluid balance with lasix gtt       Severe acute hepatitis   LFTS >2000 yesterday   Adequate perfusion, normal lactate   SYLWIA with normal biv function and normal AVR prosthesis   Concern for hepatic thrombus - negative on CTA today   Will continue to optimize perfusion and keep aggressive diuresis       Sepsis / Fever of unknown origin   All cultures negative to date   Re-sent blood cultures yesterday   On vanc cefepime flagyl now eracsis   Change lines ? CARDIOTHORACIC SURGERY (NP):   1. Severe symptomatic AS, s/p tissue AVR. Continue ASA. 2. CAD, STEMI, s/p CABG. Continue ASA. Holding amio, statin for transaminitis. Consider BB when off pressors.  repeat Echo without effusion or tamponade, NL LV/RVF, Will titrate epi for map of 80, maintain CI greater than 2.0.    3. Acute hypoxic respiratory failure:developed hypoxia at approximately 11pm on 11/24 requiring Bipap and was ultimately re-intubated at 0200 for acute respiratory failure. Heparin gtt for possible PE discontinued then resumed. CXR unchanged, will transitioned to a Lasix gtt goal for 100cc net negative per hour. -3L. Continues on broad spectrum antibiotics. Will get CTA chest while down for CTA abd   4. Acute kidney injury. Crt up to 2.01, down to 1.29 this am - avoid nephrotoxins, trend. 5. Acute blood loss anemia: H&H 7.7/24.2 - Given poor hemodynamics will transfuse 1 unit of PRBC. 6. Transaminitis. Acute Severe Hepatitis - patient now with hepatomegaly and splenomegaly, ? For possible acute thrombosis, resumed heparin. Viral panel, LDH pending. TF stopped for now. Will go for Triple phase liver scan today per GI recs. Keep NGT to suction. 7. Leukocytosis. WBC 20.6, 16.5 this am. febrile, Tmax 102.7, downto 100 off cooling blanket this am, cont Flagyl, vanco, and cefepime. BC, Sputum and urine cultures NTD. Will consult ID, repeat BC sent, cooling blanket as needed. 8. HTN. On ACEi, BB PTA; resume BB when appropriate. Some hypertension while on Epi, will add Cardene gtt if SBP > 160   9. HLD. Continue statin. 10. WALLY , not on CPAP. Was unable to tolerate due to anxiety; he has been working on this with Sleep Medicine. OP follow-up. 11. DMII. On Toujeo at home. Repeat A1C 9.0- Insulin gtt per protocol. 12. Hypothyroidism. On Synthroid PTA; continue. Repeat TSH 1.96.    13. GERD. Continue PPI.    14.Anxiety and depression. On Buspar, Zoloft; continue. Supportive care. 15. Nutrition: holding feeds at this time per GI recs   16. Ileus: NGT to suction       DVT ppx- SCDs   Dispo- ICU. Obtain Triple Phase Liver scan today, assess spleen and lungs while down. Consult ID today, not consulted over the weekend.        GENERAL SURGERY:   35 yo man 6 days s/p AVR and ascending arch repair with severe acute hepatitis. No evidence of bowel obstruction and clinically some ileus/delayed gastric emptying. PLAN:   Continue NGT decompression for now. Once scan complete, could consider advancing tube and trying post-pyloric tube feeds at trophic rate      GASTROENTEROLOGY:   Pt. is a 41yo with T2DM on insulin A1C 9-10, hypothyroidism on LT4, s/p on-pump CABG 11/22/2022 with tissue aortic valve replacement and ascending aortic aneurysm repair due to bicuspic AV, COVID positive rapic PCR negative 11/23, worsening hypoxemic respiratory failure s/p reintubation 11/24/2022 now with worsening PEEP and FiO2 requirements, severe SERGE 11/24 Cr of 2 now 1.33, with severe acute hepatitis. CT triple phase liver shows no hepatic vein dilation, no clots, portal venous system intact and no clot, no hepatic infarct. Awaiting serologies for other causes of liver injury. Needs daily INR and LFTs. With fever 102.4, awaiting Bcx. Peripheral smear with erythroleukamoid rxn nonspecific but no hemolysis. Prognosis is guarded. Remains critically ill and is at high risk of decompensation if not death. INFECTIOUS DISEASE:   1) febrile illness post CABG/AV replacement    -Has multiple risk factors for infection given post op, procedures, lines drains but no obvious source identified yet based on exam, labs, cultures or imaging    -Other non infectious etiologies that could contribute to constellation of lab abnormalities and fever include hypercoagulable state, thrombus, inflammatory state, medications    -? Post covid sequela (but does not explain the abrupt rise in LFT on 11/27 with preceding minimal elevations since admission)   -Antibiotics can also contribute to LFT elevations but appears a significant jump such as congestion, steatosis on US   -?  Choledocholithiasis/cholangitis/acalculous cholecystitis   -For benefit of doubt will stop flagyl + cefepime and instead given Meropenem and track LFT    -Started by primary team on Anidulafungin today. Would not recommend prolonged use unless know fungemia /other fungal infections. Check fungal blood culture though routine blood cultures should detect Candida if present    -Consider line change/removal unnecessary lines if not other clear etiology/persistent fever    =Lab abnormalities of thrombocytopenia + LFT elevation can be seen in tick borne illness -- doubt this is the case as these findings developed after hospitalization     -On IV vancomycin and if repeat blood cx negative -- will consider stopping    -Will consider atypical coverage ( less suspicious given history of prolonged hospitalization )  in the from of doxycycline if fever persists but would try to change antibiotic at at time to help illicit cause/effect   -While CMV/EBV infections can cause this picture wt LFT -- doubt the case. Most often serologies will be + based on previous exposure. Noted PCR for viremia ordered but can see transient viremia in immunocompetent adults   -Acute hepatitis panel negative    -Check HIV if not done in past for completeness sake        PHARMACIST:   Indication for Antimicrobials: sepsis    11/25 blood cx pending   11/25 Sputum cx (ETT aspirate) NGTD, pending    11/27 blood cx NGTD pending       Impression/Plan:    -Continue vancomycin 1250 mg IV q12h for a predicted AUC of 533. Will check a level 11/29 0400.   -Continue cefepime and metronidazole      DIABETES MANAGEMENT TEAM:   Patient has known Type 1 diabetes with poor control going into this surgery. Patient required 260 units/D prior to the surgery (during this admission) without consistently achieving BG targets. Has been on Glucostabilizer since surgery 11/22/22; using 2-6 units/hour. Very ill. Recommendations:   Joss Malave until insulin needs less than 3 units/hr      DIETICIAN:   Nutrition Recommendations/Plan: 1. Consider initiation of nutrition support in next 24-48h (TF vs TPN depending on gut function), RD to standby and provide recommendations pending clinical course        Medications:   Precedex 1.2-1 mcg/kg/hr IV titrate, Adrenalin 2-3.5 mcg/min IV titrate, Fentanyl 100 mcg/hr IV titrate, Heparin 5000 units SC q8h, Insulin regular 7.3-2.1 units/hr IV titrate, Reglan 5mg IV q6h, Protonix 40mg IV qd, Propofol 20-30 mcg/kg/min IV titrate, Lasix 10 mg/hr IV, Heparin 17 units/kg/hr IV titrate      CM Notes:   Patient remains in the ICU vented and on drips. He is POD # 5 CABG. Anticipated HH versus acute rehab.          Coronary Artery Bypass Graft (CABG) - Care Day 13 (11/27/2022) by Manjinder Orta       Review Entered Review Status   11/29/2022 1054 Completed      Criteria Review      Care Day: 13 Care Date: 11/27/2022 Level of Care: ICU    Guideline Day 3    Level Of Care    ( ) Intermediate care    11/29/2022 10:54:56 EST by Manjinder Orta      ICU    Clinical Status    (X) * Dangerous arrhythmia absent    11/29/2022 10:54:56 EST by Manjinder Orta      Normal sinus rhythm    ( ) * Pain absent or managed    11/29/2022 10:54:56 EST by Manjinder Orta      Behavioral pain scale of 3    Activity    ( ) * Minimal ambulatory activity    11/29/2022 10:54:56 EST by Adalberto, [address] sedated and intubated    Routes    (X) * Oral medications    11/29/2022 10:54:56 EST by Manjinder Orta      Buspar 15mg PO TID, Zoloft 100mg PO qd, Lactulose 45 mL PO once, Synthroid 125mcg PO q6AM, Mag-Ox 400mg PO BID, Protonix 40mg PO qd    ( ) * Advance diet    11/29/2022 10:54:56 EST by Manjinder Orta      hold trickle feeds due to Abdomen distended today    Interventions    (X) * Chest tube absent    11/29/2022 10:54:56 EST by Manjinder Orta      Chest tube not indicated    ( ) * Central lines absent    11/29/2022 10:54:56 EST by Manjinder Orta      Right Central Venous Catheter, Double Lumen    ( ) * Pacing wires absent    11/29/2022 10:54:56 EST by Manjinder Orta 2 atrial wires, 1 bipolar ventricular wire    Medications    (X) * Epidural analgesics absent    11/29/2022 10:54:56 EST by Mirza Walker      None    ( ) Prophylactic antibiotics discontinued    11/29/2022 10:54:56 EST by Mirza Walker      Vancomycin 1250mg IV q12h, Maxipime 2g IV q8h, Flagyl 500mg IV q12h, Vancomycin 1000mg IV q12h    (X) Aspirin    11/29/2022 10:54:56 EST by Mirza Walker      Aspirin 81mg PO qd    (X) Statin    11/29/2022 10:54:56 EST by Mirza Walker      Crestor 40mg PO qd    (X) Antihypertensive medication if needed    11/29/2022 10:54:56 EST by Mirza Walker      Cardene 2.5 mg/hr IV titrate    * Milestone   Additional Notes   DATE: 11/27/22      Pertinent Updates:   -fio2 and PEEP requirement has increased, Now on FiO2 > 50%;PEEP > 7.5   -CI dropped to 1.9. Epi increased to 2 mcg/min.   -Patient with frequent ectopy. Cardene gtt started at 2.5 mg/hr to maintain SBP <160   -Patient becoming agitated hitting hands against side rails. Patient currently in bilateral wrist restraints. BP elevated. Propofol increased to 20 mcg/kg/min.   -Abdomen noted to be distended. Residuals taken, only got back 100mL    -LFT's : very high AST and ALT   -H&H 7.7/24.2 - transfused 1 unit of PRBC       Vitals:   102.7 °F (39.3 °C) 70 93/54 20 95% Endotracheal tube;Ventilator      Abnl/Pertinent Labs/Radiology/Diagnostic Studies:   WBC: 13.9 (H)   NRBC: 0.9 (H)   RBC: 2.88 (L)   HGB: 7.9 (L)   HCT: 24.6 (L)   RDW: 14.7 (H)   PLATELET: 515 (L)   NEUTROPHILS: 80 (H)   MONOCYTES: 1 (L)   ABSOLUTE NRBC: 0.12 (H)   ABS. NEUTROPHILS: 11.1 (H)   INR: 1.3 (H)   Prothrombin time: 13.3 (H)   D-dimer: 6.78 (H)   Glucose: 121 (H), 109 (H)   BUN: 49 (H)   Creatinine: 1.47 (H)   BUN/Creatinine ratio: 33 (H)   Calcium: 8.0 (L)   Magnesium: 2.7 (H)   Bilirubin, total: 1.3 (H)   Protein, total: 6.2 (L)   Albumin: 2.6 (L)   A-G Ratio: 0.7 (L)   ALT: 1,124 (H)   AST: 1,433 (H)   Alk.  phosphatase: 451 (H)   LD: 1,182 (H) Ammonia, plasma: 46 (H)   PO2: 104 (H)   O2 SAT: 98 (H)   Oxyhemoglobin: 57.8 (LL)   O2 SATURATION: 58 (L)   Carboxy-Hgb: 0.0 (L)   pO2 (POC): 75 (L)   GLUCOSE,FAST - POC: 125 (H), 111, 129 (H), 106, 126 (H), 139 (H), 128 (H), 127 (H)      Blood Cx: No growth after 14hrs (Pending)      Peripheral Blood Smear:   -Leukocytosis with myeloid lineage predominance and marked left shift    including immature forms.    -Microcytic hypochromic erythrocytes. -Platelets within normal limits. CXR:   Left lower lobe atelectasis or pneumonic infiltrate. Support lines   in expected positions as above. XR ABD (KUB):   Positive bowel gas limits evaluation of bowel loops with some   colonic gas and stool seen and no interval antegrade progression of gastric   luminal contrast material over the last 2 days. US ABD:   Increased hepatic echogenicity compatible with diffuse   hepatocellular process, most commonly seen in steatosis. ECHO:   Left Ventricle: Low normal left ventricular systolic function with a visually estimated EF of 50 - 55%. Left ventricle size is normal. Increased wall thickness. Left Atrium: Left atrium size is normal.   Right Ventricle: Not well visualized. Right Atrium: Not well visualized. Aortic Valve: Bioprosthetic valve. No regurgitation. No stenosis. Pericardium: There is pericardial thickening and evidence of epicardial fat. No pericardial effusion. Right atrial is not well visualized. Physical Exam:   Constitutional:        Appearance: He is ill-appearing. Interventions: He is intubated. Comments: Intubated sedated    HENT:       Head: Normocephalic. Mouth/Throat:       Mouth: Mucous membranes are moist.    Eyes:       Pupils: Pupils are equal, round, and reactive to light. Pulmonary:       Effort: He is intubated. Breath sounds: Decreased air movement present. No wheezing or rhonchi. Abdominal:       General: There is distension. Palpations: Abdomen is soft. Musculoskeletal:       Right lower leg: Edema present. Left lower leg: Edema present. MD Consults/Assessments & Plans:   CRITICAL CARE MED:   I. PULMONARY:   1. Acute hypoxic resopiratory failure       a. Covid rapid +, PCR negative      b. D dimer / LE doppler negative - unlikely PE   2. Pulmonary edema/ pleural effusions      Plan:   - stop heparin    - Vent settings established, reviewed and/or adjusted as per orders   - Continue nebulized bronchodilators and steroids   - stat lasix drip today      II. CARDIOVASCULAR/HEMODYNAMIC:   3. Volume overload     a. Suspect some degree of RV failue         - Unable to visualize on echo but PA pressures elevated on monitor          - >7L positive for hospital stay    Current vasopressors: Epi 2       Plan:   - Cont lasix: switch to lasix drip   - ICU hemodynamic/cardiac monitoring   - MAP goal > 65 mmHg       III. RENAL:   4. SERGE       Plan:   - Monitor I/Os and Uo   - Monitor renal function panel intermittently   - Correct electrolytes as needed   - Avoid nephrotoxic meds       IV. GI/NUTRITION:   Abdomen distended today : hold trickle feeds   Obtain KUB   Bedside US of abdomen   Amylase and Lipase   Start lactulose as part of bowel regimen      V. INFECTIOUS DISEASE:   5. Covid ruled out with 2 negative PCR   6. Intermittent fevers     a. Cultures pending       Plan:   - Cont broad spectrum abx       VI. NEURO   Sedation for vent synchrony       Plan:   - Daily SAT when meets ICU criteria   - ABCDEF mobility bundle   - PT/OT involvement when appropriate      ADDENDUM:   Reviewed LFT's : very high AST and ALT   Suspect hepatic ischemia/infarction    Spoke with US tech : will not be able get good view to be able to doppler the hepatic artery and portal vein   Patient is not stable for CTA of abdomen    Consulted GI       CARDIOTHORACIC SURGERY:   1. Severe symptomatic AS, s/p tissue AVR. Continue ASA. 2. CAD, STEMI, s/p CABG. Continue ASA, amio, statin. Consider BB when off pressors. Now with poor CO/CI, MvO2 with MAP of 65, repeat Echo this am without effusion or tamponade, NL LV/RVF, Will titrate epi for map of 80, maintain CI greater than 2.0. May need to add dobutamine. 3. Acute hypoxic respiratory failure: Patient developed hypoxia at approximately 11pm on 11/24 requiring Bipap and was ultimately re-intubated at 0200 for acute respiratory failure. Bronchoscopy was completed 11/24 and was unremarkable. SYLWIA was completed 11/24 with normal RV and LVF without effusion or shunting. CTA was no completed due to SERGE. Heparin gtt for possible PE discontinued by [facility] yesterday. -500cc/24, CXR unchanged, will transitioned to a Lasix gtt goal for 100cc net negative per hour. Continues on broad spectrum antibiotics. 4. Acute kidney injury. Creatinine 2.01>1.79>1.23>1.33>1.15 - Avoid nephrotoxins, trend. 5. Acute blood loss anemia: H&H 7.7/24.2 - Given poor hemodynamics will transfuse 1 unit of PRBC. 6. Transaminitis. Acute Hepatitis, patient now with hepatomegaly and splenomegaly,? For possible acute thrombosis, will resume heparin gtt now. Recommendations for DT triple phase of the liver and ABD however the patient is not stable for transport. Viral panel, LDH pending. TF stopped for now. 7. Leukocytosis. WBC 20.6>18.6>17.1>11.7>13.1. febrile, tm 101.8 today, now on Flagyl; vanco, and cefepime, BC, Sputum and urine cultures NTD. Will consult ID, repeat BC, cooling blanket. 8. HTN. On ACEI, BB PTA; resume BB when appropriate. 9. HLD. Continue statin. 10. WALLY , not on CPAP. Was unable to tolerate due to anxiety; he has been working on this with Sleep Medicine. OP follow-up. 11. DMII. On Toujeo at home. Repeat A1C 9.0- Insulin gtt per protocol. 12. Hypothyroidism. On Synthroid PTA; continue. Repeat TSH 1.96.    13. GERD. Continue PPI. 14. Anxiety and depression. On Buspar, Zoloft; continue. Supportive care. 15. Nutrition: trickle TF today. DVT ppx- SCDs   Dispo- ICU today       CARDIOLOGY:   Acute non-Q wave non-ST elevation myocardial infarction    Aortic stenosis    CAD    S/P CABG x 1   S/P AVR (aortic valve replacement) and aortoplasty       PLAN:   Limited echo done at the bedside. reliminarily, normal LV systolic function, EF 79-16%. R heart appears normal in size and function on limited views. Trace if any pericardial effusion. GENERAL SURGERY:   KUB this morning demonstrates persistent gastric contrast x 2 days. 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 Pt reintubated, tubefeeds held. Recommend NGT decompression for now. GASTROENTEROLOGY:   This is a critical hepatitis, but his liver function appears intact so far. Usually if ischemic from surgery, the peak is sooner after the event then downtrends. He did have some mild worsening hepatitis which peaked 11/24 and was down trending slowly. He has an acute critical hepatitis insult. Unlikely to be an acute viral infection, but will send studies. Main concern is an acute thrombosis or severe congestion now that he has splenomegaly and diffuse congestion in liver. Minimal use of tylenol, as well as amiodarone started 11/23. He has been on continuous high-intensity statin rosuvastatin 40mg daily, recommend temporary holding of this. If possible, recommend holding amiodarone. I do recommend empiric NAC protocol. Will defer to ICU team to order as there may be issues with volume overload. I strongly recommend a stat CT triple phase of liver and abdomen to assess for thromboses of portal vein, hepatic veins, and assess for acute ischemia of liver. Will also send LDH and peripheral smear for possible DIC and help assess viral vs ischemic etiologies.        Will send serologies for viral hepatitis infections, including hepatitis D superinfection and HSV (strongly doubt), but also CMV and EBV. Will send autoimmune hepatitis studies. Again, I doubt this is a cause. Awaiting INR, and in this instance without a history of cirrhosis, will send ammonia level off the A-line placed immediately on ice. Needs daily INR and LFTs. His mean PAP on Bryan catheter has been slowly increasing the past few days and is >30. If cardiology does not believe cardiac congestion from elevated PAPs, and if can not get CT angiography, recommend discuss with IR for transjugular pressure measurements. If the wedge / hepatic vein pressures show elevation or stenosis that would be concerning for budd chiari and would need IR intervention. If the wedge is normal he could still have a portal vein thrombus. Preferable to get the CT triple phase.        PHARMACIST:   Indication for Antimicrobials: sepsis    11/25 blood cx pending   11/25 Sputum cx (ETT aspirate) NGTD, pending       Impression/Plan:    -Scr improved some from yesterday - watch closely   -Vancomycin level on the low end of goal range   -Will increase vancomycin to 1250 mg q12h for now, anticipated        Medications:   Precedex 1-1.2 mcg/kg/hr IV titrate, Adrenalin 1-4 mcg/min IV titrate, Fentanyl 100 mcg/hr IV titrate, Insulin regular 1.3-7 units/hr IV titrate, Reglan 5mg IV q6h, Propofol 15-20 mcg/kg/min IV titrate, Lovenox 30mg SC q12h, Lasix 10 mg/hr IV, Lasix 40mg IV q8h, Heparin 17 units/kg/hr IV titrate           Coronary Artery Bypass Graft (CABG) - Care Day 12 (11/26/2022) by Dori Gann       Review Entered Review Status   11/29/2022 1004 Completed      Criteria Review      Care Day: 12 Care Date: 11/26/2022 Level of Care: ICU    Guideline Day 3    Level Of Care    ( ) Intermediate care    11/29/2022 10:04:15 EST by Dori Gann      ICU    Clinical Status    (X) * Dangerous arrhythmia absent    11/29/2022 10:04:15 EST by Dori Gann      Tele: Normal sinus rhythm    ( ) * Pain absent or managed    11/29/2022 10:04:15 EST by Iliana Iraheta      Behavioral pain scale of 3    Activity    ( ) * Minimal ambulatory activity    11/29/2022 10:04:15 EST by Iliana Iraheta      remains intubated and sedated    Routes    (X) * Oral medications    11/29/2022 10:04:15 EST by Iliana Iraheta      Buspar 15mg PO TID, Synthroid 125mcg PO q6AM, Zoloft 100mg PO qd, Mag-Ox 400mg PO BID, Protonix 40mg PO qd    ( ) * Advance diet    11/29/2022 10:04:15 EST by Iliana Iraheta      Start trickle feeds. Start TwoCal at 15mL/hr + FWF 50mL Q6hr    Interventions    (X) * Chest tube absent    11/29/2022 10:04:15 EST by Iliana Iraheta      Chest tube not indicated    ( ) * Central lines absent    11/29/2022 10:04:15 EST by Iliana Iraheta      Right Central Venous Catheter, Double Lumen    ( ) * Pacing wires absent    11/29/2022 10:04:15 EST by Iliana Iraheta      2 atrial wires, 1 bipolar ventricular wire    Medications    (X) * Epidural analgesics absent    11/29/2022 10:04:15 EST by Iliana Iraheta      No epidural analgesics  Dilaudid 1mg IV q4h PRN x2, Tylenol 500mg PO q6h PRN x2    ( ) Prophylactic antibiotics discontinued    11/29/2022 10:04:15 EST by Iliana Iraheta      Maxipime 2g IV q8h, Flagyl 500mg IV q12h, Vancomycin 1000mg IV q18h>q12h    (X) Aspirin    11/29/2022 10:04:15 EST by Iliana Iraheta      Aspirin 81mg PO qd    (X) Statin    11/29/2022 10:04:15 EST by Iliana Iraheta      Crestor 40mg PO qd    * Milestone   Additional Notes   DATE: 11/26/22      Pertinent Updates:   Patient remains intubated, epoprostenol now off. Now on 60% FiO2 and 12 of PEEP. CXR this am with decreasing pulmonary edema. CT output 70cc/24, no air leak. Tm over the last 24 hours 102.4. He is on Epi @ 2, Mv02 52%, CO/CI 5.1/2.2.        Vitals:   102.7 F (39.3 C) 112 88/42 20 93% Endotracheal tube;Ventilator      Abnl/Pertinent Labs/Radiology/Diagnostic Studies:   WBC: 11.7 (H)   NRBC: 0.2 (H)   RBC: 2.70 (L)   HGB: 7.5 (L)   HCT: 23.1 (L)   RDW: 14.7 (H)   PLATELET: 920 (L)   ABSOLUTE NRBC: 0.02 (H)   ABS. NEUTROPHILS: 8.7 (H)   ABS. MONOCYTES: 1.1 (H)   Glucose: 149 (H)   BUN: 36 (H)   Creatinine: 1.33 (H)   BUN/Creatinine ratio: 27 (H)   Calcium: 8.1 (L)   Bilirubin, total: 1.2 (H)   Protein, total: 6.1 (L)   Albumin: 2.7 (L)   A-G Ratio: 0.8 (L)   ALT: 104 (H)   AST: 132 (H)   Alk. phosphatase: 162 (H)   GLUCOSE,FAST - POC: 121 (H), 142 (H), 137 (H), 118 (H), 92, 145 (H), 146 (H), 106, 123 (H), 121 (H)      CXR:   No interval change      Physical Exam:   Constitutional:        Appearance: He is ill-appearing. Interventions: He is intubated. Comments: Intubated sedated    HENT:       Head: Normocephalic. Mouth/Throat:       Mouth: Mucous membranes are moist.    Eyes:       Pupils: Pupils are equal, round, and reactive to light. Pulmonary:       Effort: He is intubated. Breath sounds: Decreased air movement present. No wheezing or rhonchi. Abdominal:       General: There is distension. Palpations: Abdomen is soft. Musculoskeletal:       Right lower leg: Edema present. Left lower leg: Edema present. MD Consults/Assessments & Plans:   CRITICAL CARE MED:   I. PULMONARY:   1. Acute hypoxic resopiratory failure       a. Covid rapid +, PCR negative      b. D dimer / LE doppler negative - unlikely PE   2. Pulmonary edema/ pleural effusions      Plan:   - stop heparin    - Vent settings established, reviewed and/or adjusted as per orders   - Continue nebulized bronchodilators and steroids   - Supplemental O2 to maintain SpO2 92-97%   - Daily SBT when meets criteria   - Wean veletri to off      II. CARDIOVASCULAR/HEMODYNAMIC:   3. Volume overload     a.  Suspect some degree of RV failue         - Unable to visualize on echo but PA pressures elevated on monitor          - >7L positive for hospital stay    Current vasopressors: Epi 2       Plan:   - Cont lasix TID   - ICU hemodynamic/cardiac monitoring   - MAP goal > 65 mmHg       III. RENAL:   4. SERGE       Plan:   - Monitor I/Os and Uo   - Monitor renal function panel intermittently   - Correct electrolytes as needed   - Avoid nephrotoxic meds       IV. GI/NUTRITION:   Plan:   - start trickle feeds      V. INFECTIOUS DISEASE:   5. Covid ruled out with 2 negative PCR   6. Intermittent fevers     a. Cultures pending       Plan:   - Cont broad spectrum abx       VI. NEURO   Sedation for vent synchrony       Plan:   - Daily SAT when meets ICU criteria   - ABCDEF mobility bundle   - PT/OT involvement when appropriate       CARDIOTHORACIC SURGERY:   1. Severe symptomatic AS, s/p tissue AVR. Continue ASA. 2. CAD, STEMI, s/p CABG. Continue ASA, amio, statin. Consider BB when off pressors. Wean Epi for MAP greater than 75.   3. Acute hypoxic respiratory failure: Patient developed hypoxia at approximately 11pm on 11/24 requiring Bipap and was ultimately re-intubated at 0200 for acute respiratory failure. Bronchoscopy was completed 11/24 and was unremarkable. SYLWIA was completed 11/24 with normal RV and LVF without effusion or shunting. CTA was no completed due to SERGE. Heparin gtt for possible PE discontinued by [facility]. Lasix 40 mg IV TID today. 4. Acute kidney injury. Creatinine 2.01>1.79>1.23>1. 33. Avoid nephrotoxins, trend. 5. Acute blood loss anemia: H&H 7.5/23.1 -  Monitor. 6. Transaminitis. Avoid hepatotoxins, trend. 7. Leukocytosis. WBC 20.6>18.6>17.1>11.7. febrile, tm 102.2 last 24, now on Flagyl; vanco, and cefepime, BC, Sputum and urine cultures NTD. 8. HTN. On ACEI, BB PTA; resume BB when appropriate. 9. HLD. Continue statin. 10. WALLY , not on CPAP. Was unable to tolerate due to anxiety; he has been working on this with Sleep Medicine. OP follow-up. 11. DMII. On Toujeo at home. Repeat A1C 9.0- Insulin gtt per protocol. 12. Hypothyroidism. On Synthroid PTA; continue. Repeat TSH 1.96.    13. GERD. Continue PPI. 14. Anxiety and depression.  On Buspar, Zoloft; continue. Supportive care. 15. Nutrition: Start trickle TF today. DVT ppx- SCDs, heparin.    Dispo- ICU today        PHARMACIST:   Indication for Antimicrobials: sepsis    11/25 blood cx pending   11/25 Sputum cx (ETT aspirate) NGTD, pending       Impression/Plan:    -Scr increased some from yesterday - watch closely   -Vancomycin level below goal range, cleared quickly   -Will increase vancomycin to 1000 mg q12h for now, anticipated  and check trough tomorrow   -Antimicrobial stop date pending      Medications:   Precedex 1 mcg/kg/hr IV titrate, Adrenalin 2-1 mcg/min IV titrate, Fentanyl 100 mcg/hr IV titrate, Insulin regular 2.5-8.5 units/hr IV titrate, Lidocaine 4% 2 patch TD q24h, Propofol 5-10 mcg/kg/min IV titrate, Buminate 12.5g IV q6h, Lovenox 30mg SC q12h, Veletri 20-10 ng/kg/min IN, Lasix 40mg IV q8h        Coronary Artery Bypass Graft (CABG) - Care Day 11 (11/25/2022) by Iliana Iraheta       Review Entered Review Status   11/29/2022 0925 Completed      Criteria Review      Care Day: 11 Care Date: 11/25/2022 Level of Care: ICU    Guideline Day 3    Level Of Care    ( ) Intermediate care    11/29/2022 09:25:18 EST by Iliana Iraheta      ICU    Clinical Status    (X) * Dangerous arrhythmia absent    11/29/2022 09:25:18 EST by Iliana Iraheta      EKG:  Normal sinus rhythm   T wave abnormality, consider lateral ischemia   When compared with ECG of 25-NOV-2022 13:46,   Inferolateral T wave inversions has replaced non specific STT changes    ( ) * Pain absent or managed    11/29/2022 09:25:18 EST by Iliana Iraheta      Behavioral pain scale of 3    Activity    ( ) * Minimal ambulatory activity    11/29/2022 09:25:18 EST by Iliana Irahtea      Remains sedated and intubated    Routes    (X) * Oral medications    11/29/2022 09:25:18 EST by Iliana Iraheta      Buspar 15mg PO TID, Synthroid 125mcg PO q6AM, Zoloft 100mg PO qd, Cordarone 400mg PO BID, Protonix 40mg PO qd    ( ) * Advance diet    11/29/2022 09:25:18 EST by Ruthie Smith      Start trickle feeds. Start TwoCal at 15mL/hr + FWF 50mL Q6hr    (X) IV fluids    11/29/2022 09:25:18 EST by Ruthie Smith      NS 10 mL/hr IV    Interventions    (X) * Chest tube absent    11/29/2022 09:25:18 EST by Ruthie Smith      Chest tube not indicated    ( ) * Central lines absent    11/29/2022 09:25:18 EST by Ruthie Smith      Right Central Venous Catheter, Double Lumen    ( ) * Pacing wires absent    11/29/2022 09:25:18 EST by Ruthie Smith      2 atrial wires, 1 bipolar ventricular wire    Medications    (X) * Epidural analgesics absent    11/29/2022 09:25:18 EST by Ruthie Smith      Epidural analgesic not indicated  Dilaudid 1mg IV q4h PRN x2, Tylenol 500mg PO q6h PRN x3    ( ) Prophylactic antibiotics discontinued    11/29/2022 09:25:18 EST by Ruthie Smith      Maxipime 2g IV q8h, Vancomycin 2500mg IV once, Flagyl 500mg IV q12h    (X) Aspirin    11/29/2022 09:25:18 EST by Ruthie Smith      Aspirin 81mg PO qd    (X) Statin    11/29/2022 09:25:18 EST by Ruthie Smith      Crestor 40mg PO qd    (X) Antihypertensive medication if needed    11/29/2022 09:25:18 EST by Ruthie Smith      Nicardipine 5 mg/hr IV titrate    * Milestone   Additional Notes   DATE: 11/25/22      Pertinent Updates:   -Intermittent desaturation events. CXR this a.m. with pulmonary edema   -Remains febrile    -BP dropped to SBP 70's suddenly, rhythm change noted on monitor, appearing to be junctional although HR maintained 70-80 bpm. Attempted to obtain EKG but unable to hook up before patient converted NSR. Upon return to NSR 's. Epi decreased to 3 mcg. -CI down to 1.9, Epi increased to 3.5   -Patient diuresed >700 cc, PAP 26/13, CVP 9, SVR 2000, CI down to 1.3. Order to hold Lasix and give 25% albumin.     - BP increased drastically, 's-190's. Prop increased to 50 mcg. BP reached 220's/90's. Prop increased to 50 mcg. BP reached 220's/90's. Epi restarted and Prop increased to 10 mcg. Vitals:   101.9 F (38.8 C) 87 115/58 20 97% Endotracheal tube;Ventilator      Abnl/Pertinent Labs/Radiology/Diagnostic Studies:   WBC: 17.1 (H)   NRBC: 0.1 (H)   RBC: 3.08 (L)   HGB: 8.6 (L)   HCT: 26.2 (L)   RDW: 14.9 (H)   PLATELET: 615 (L)   ABSOLUTE NRBC: 0.02 (H)   aPTT: 64.3 (H)   Sodium: 135 (L)   Anion gap: 4 (L)   Glucose: 118 (H), 137 (H)   BUN: 29 (H), 31 (H)   BUN/Creatinine ratio: 23 (H), 26 (H)   Calcium: 8.1 (L), 7.6 (L)   Protein, total: 5.7 (L)   Albumin: 2.3 (L)   A-G Ratio: 0.7 (L)   ALT: 115 (H)   AST: 163 (H)   Alk. phosphatase: 167 (H)   C-Reactive protein: 25.00 (H)   Procalcitonin: 0.70   GLUCOSE,FAST - POC: 127 (H), 118 (H), 129 (H), 120 (H), 115, 135 (H), 146 (H), 116, 121 (H)      Blood Cx: No growth 3 days (Pending)      URINALYSIS:   Color: YELLOW/STRAW   Appearance: CLEAR   Specific gravity: 1.006   pH (UA): 6.0   Protein: Negative   Glucose: Negative   Ketone: Negative   Blood: SMALL ! Bilirubin: Negative   Urobilinogen: 1.0   Nitrites: Negative   Leukocyte Esterase: Negative   Epithelial cells: FEW   WBC: 0-4   RBC: 0-5   Bacteria: Negative   Hyaline cast: 0-2      CXR:   Worsening pulmonary edema and pleural effusions. Physical Exam:   Constitutional:        Appearance: He is ill-appearing. Interventions: He is intubated. Comments: Intubated sedated    HENT:       Head: Normocephalic. Mouth/Throat:       Mouth: Mucous membranes are moist.    Eyes:       Pupils: Pupils are equal, round, and reactive to light. Pulmonary:       Effort: He is intubated. Breath sounds: Decreased air movement present. No wheezing or rhonchi. Abdominal:       General: There is distension. Palpations: Abdomen is soft. Musculoskeletal:       Right lower leg: Edema present. Left lower leg: Edema present. MD Consults/Assessments & Plans:   CRITICAL CARE MED:   I. PULMONARY:   1.  Acute hypoxic resopiratory failure       a. Covid rapid +, PCR negative      b. D dimer / LE doppler negative - unlikely PE   2. Pulmonary edema/ pleural effusions       Plan:   - recommend to stop heparin - defer to CTS   - Vent settings established, reviewed and/or adjusted as per orders   - Continue nebulized bronchodilators and steroids   - Supplemental O2 to maintain SpO2 92-97%   - Daily SBT when meets criteria   - Wean veletri       II. CARDIOVASCULAR/HEMODYNAMIC:   3. Volume overload     a. Suspect some degree of RV failue         - Unable to visualize on echo but PA pressures elevated on monitor          - >7L positive for hospital stay    Current vasopressors: Epi 2        Plan:   - Add lasix TID   - ICU hemodynamic/cardiac monitoring   - MAP goal > 65 mmHg       III. RENAL:   4. SERGE        Plan:   - Monitor I/Os and Uo   - Monitor renal function panel intermittently   - Correct electrolytes as needed   - Avoid nephrotoxic meds       IV. GI/NUTRITION:   Plan:   - start trickle feeds   - Do not advance while on epi       V. INFECTIOUS DISEASE   5. Covid? Plan:   - start broad spectrum abx until PNA ruled out   - pan culture   - covid PCR       VI. NEURO   Sedation for vent synchrony       Plan:   - add propofol   - Daily SAT when meets ICU criteria   - ABCDEF mobility bundle   - PT/OT involvement when appropriate       CARDIOTHORACIC SURGERY:   1. Severe symptomatic AS, s/p tissue AVR. Continue ASA. 2. CAD, STEMI, s/p CABG. Continue ASA, amio, statin. Consider BB when off pressors. Wean Epi for MAP greater than 75.   3. Acute hypoxic respiratory failure: Patient developed hypoxia at approximately 11pm on 11/24 requiring Bipap and was ultimately re-intubated at 0200 for acute respiratory failure. Bronchoscopy was completed 11/24 and was unremarkable. SYLWIA was completed 11/24 with normal RV and LVF without effusion or shunting. CTA was no completed due to SERGE.  Will continue heparin gtt for possible PE, may consider CT for PE once patient is more stable. May consider steroids and antiviral for COVID. BLE US (-) for DVT. Continue Epoprostenol per CCM, Lasix 60 mg IV TID today. 4. Acute kidney injury. Creatinine 2.01>1.79>1.23. avoid nephrotoxins, trend. 5. Acute blood loss anemia: H&H 8.6/26.2. Monitor. 6. Transaminitis. Avoid hepatotoxins, trend. 7. Leukocytosis. WBC 20.6>18.6>17.1. febrile last 36 hours, tm 102.2 last 24, now on Flagyl; vanco, and cefepime, BC, Sputum and urine cultures pending. 8. HTN. On ACEI, BB PTA; resume BB when appropriate. 9. HLD. Continue statin. 10. WALLY , not on CPAP. Was unable to tolerate due to anxiety; he has been working on this with Sleep Medicine. OP follow-up. 11. DMII. On Toujeo at home. Repeat A1C 9.0- Insulin gtt per protocol. 12. Hypothyroidism. On Synthroid PTA; continue. Repeat TSH 1.96.    13. GERD. Continue PPI. 14. Anxiety and depression. On Buspar, Zoloft; continue. Supportive care. 15. Nutrition: Start trickle TF today. DVT ppx- SCDs, heparin. Dispo- ICU today        NEPHROLOGY:   SERGE  likely due to ATN from hypotension. UA with protein and blood. He has a pennington. Hold on renal imaging. Fair  UO. He appears to be volume overloaded. There is no acute need for dialysis. Creatinine better. OK to diurese as needed. Potassium better. Patient is critically ill and at significant risk of deterioration. PHARMACIST:   Indication for Antimicrobials: sepsis    11/25 blood cx pending   Impression/Plan:    -Scr trending down   -Vancomycin 2500 mg loading dose   -Anticipated dose 1000 mg IV q18h but will monitor levels early due to recovering SERGE   -Antimicrobial stop date pending      DIETICIAN:   Start TwoCal at 15mL/hr + FWF 50mL Q6hr. When cleared to advance, increase rate 10mL Q6hr to goal rate 35mL/hr + Prosource x7 packs per day. Will provide 147g Pro, 1960kcals, 184g CHO, 788mL water.  Will meet 100% protein and 75% kcals, appropriate to permissively underfeed for BMI 40.5. Medications:   Precedex 1 mcg/kg/hr IV titrate, Adrenalin 4-3 mcg/min IV titrate, Fentanyl 100 mcg/hr IV titrate, Insulin regular 1-25 units/hr IV titrate, Lidocaine 4% 2 patch TD q24h, Propofol 20-5 mcg/kg/min IV titrate, Buminate 12.5g IV q6h, Lasix 60mg IV q8h, Veletri 30 ng/kg/min IN, Heparin 118 units/kg/hr IV titrate        Coronary Artery Bypass Graft (CABG) - Care Day 10 (11/24/2022) by Ruthie Smith       Review Entered Review Status   11/25/2022 1032 Completed      Criteria Review      Care Day: 10 Care Date: 11/24/2022 Level of Care: ICU    Guideline Day 3    Level Of Care    ( ) Intermediate care    11/25/2022 10:32:12 EST by Ruthie Smith      ICU    Clinical Status    (X) * Dangerous arrhythmia absent    11/25/2022 10:32:12 EST by Ruthie Smith      Heart: Regular rate and rhythm  and no murmurs, rubs or gallops    ( ) * Pain absent or managed    11/25/2022 10:32:12 EST by Ruthie Smith      pt nodded head \"yes\" when asked if still in pain,  Fentanyl infusion titrated again @ 100 mcg/hr. Activity    ( ) * Minimal ambulatory activity    11/25/2022 10:32:12 EST by Ruthie Smith      Intubated and sedated    Routes    (X) * Oral medications    11/25/2022 10:32:12 EST by Ruthie Smith      Tylenol 500mg PO q6h PRN x1, Cordarone 400mg PO BID, Buspar 15mg PO TID    (X) * Advance diet    11/25/2022 10:32:12 EST by Ruthie Smith      ADULT DIET Clear Liquid; 4 carb choices (60 gm/meal) -   ADULT ORAL NUTRITION SUPPLEMENT Breakfast, Dinner;  Low Calorie/High Protein -    (X) IV fluids    11/25/2022 10:32:12 EST by Ruthie Smith      NS 50 mL/hr IV    Interventions    (X) * Chest tube absent    11/25/2022 10:32:12 EST by Ruthie Hawk      Chest tube not indicated    ( ) * Central lines absent    11/25/2022 10:32:12 EST by Ruthie Smith      Right Central Venous Catheter, Double Lumen    ( ) * Pacing wires absent 11/25/2022 10:32:12 EST by Michelle Partida      2 atrial wires, 1 bipolar ventricular wire    (X) Cardiac rehabilitation    11/25/2022 10:32:12 EST by Michelle Partida      Smoking history assessed. Patient is a former smoker. Smoking Cessation Program link has not been added to the AVS.     [facility] Rehab will attempt to follow up. Medications    (X) * Epidural analgesics absent    11/25/2022 10:32:12 EST by Michelle Partida      Dilaudid 0.5mg IV q4h PRN x2, Dilaudid 1mg IV q4h PRN x2    (X) Antihypertensive medication if needed    11/25/2022 10:32:12 EST by Michelle Partida      Nicardipine 5-15 mg/hr IV titrate    * Milestone   Additional Notes   DATE: 11/24/22      Pertinent Updates:   -Pt. re-intubated due to respiratory failure now on vent support A/C - R 16, , FiO2 100%, and PEEP 12. Pt tested positive for Covid.   -He remains hypoxic, hypercarbic and slightly acidotic this am   -Febrile (+)      Vitals:   102.2 °F (39 °C) 104 104/59 33 80% Endotracheal tube;Ventilator      Abnl/Pertinent Labs/Radiology/Diagnostic Studies:   WBC: 16.4 (H), 18.6 (H)    NRBC: 0.1 (H), 0.2 (H)   RBC: 3.38 (L), 3.34 (L)   HGB: 9.2 (L), 9.1 (L)   HCT: 29.2 (L), 28.7 (L)   RDW: 15.3 (H), 15.1 (H)   PLATELET: 198 (L), 143 (L)   LYMPHOCYTES: 10 (L)   MONOCYTES: 16 (H)   ABSOLUTE NRBC: 0.02 (H), 0.03 (H)   ABS. NEUTROPHILS: 11.5 (H), 13.7 (H)   ABS. MONOCYTES: 1.6 (H), 3.0 (H)   ABS.  BASOPHILS: 0.2 (H)   Sodium: 133 (L), 134 (L), 135 (L)   Anion gap: 4 (L)   Glucose: 122 (H), 124 (H), 144 (H)   BUN: 34 (H), 34 (H), 34 (H)   Creatinine: 1.82 (H), 1.79 (H), 1.60 (H)   BUN/Creatinine ratio: 21 (H)   Calcium: 8.4 (L)   eGFR: 47 (L), 48 (L), 54 (L)   Bilirubin, total: 2.0 (H), 1.6 (H)   Protein, total: 5.8 (L), 5.8 (L)   Albumin: 3.0 (L), 2.9 (L)   A-G Ratio: 1.0 (L)   ALT: 155 (H), 151 (H)   AST: 340 (H), 331 (H)   Troponin-High Sensitivity: 6,485 (HH)   NT pro-BNP: 2,833 (H)   pH: 7.32 (L)   PCO2: 50 (H)   PO2: 51 (L), 66 (L), 199 (H)   O2 SAT: 86 (L), 91 (L), 99 (L)   GLUCOSE,FAST - POC: 122 (H), 112, 80, 138 (H), 128 (H), 122 (H)      Covid-19 Rapid Test: DETECTED   RSV by PCR: Not Detected   SARS-COV-2: Pending      ECHO:   Pt became acutely hypoxic, had to be re intubated, CXR only shows mild pulmonary edema. C/f PE or shunt. Please evaluate RV to assess for RV strain and do bubble study to assess for shunt      DUPLEX LOWER EXT VENOUS BILAT:   Right Lower Venous   No evidence of deep vein thrombosis in the common femoral, profunda femoral, femoral, popliteal, posterior tibial, and peroneal veins. The veins were imaged in the transverse and longitudinal planes. The vessels showed normal color filling and compressibility. Doppler interrogation showed phasic and spontaneous flow. Left Lower Venous   No evidence of deep vein thrombosis in the common femoral, profunda femoral, femoral, popliteal, posterior tibial, and peroneal veins. The veins were imaged in the transverse and longitudinal planes. The vessels showed normal color filling and compressibility. Doppler interrogation showed phasic and spontaneous flow. BRONCHOSCOPY:   Procedure Details:    -The bronchoscope was introduced through an endotracheal tube. -The trachea and whitney were completely inspected and were found to be normal.   -The right-sided endobronchial anatomy was completely inspected and was found to be normal with minimal secretions.   -The left-sided endobronchial anatomy was completely inspected and was found to be normal with minimal secretions. CXR:   1. Stable support devices. Decreased minimal pulmonary edema. 2. Satisfactory endotracheal tube placement. Stable minimal pulmonary edema      Physical Exam:   General: Intubated, sedated.     Lungs: Coarse to auscultation bilaterally   Incision: Incision clean, dry and intact and prineo intact   Heart: Regular rate and rhythm  and no murmurs, rubs or gallops   Abdomen: Distended, hypoactive bowel sounds, and obese   Extremities: Non-pitting edema BLE   Neurologic: Intubated and sedated. MD Consults/Assessments & Plans:   CRITICAL CARE MED:   Neuro   -continue sedation   - pt able to follow commands on current level of sedation       CVS   CAD s/p CABG   - wean pressors to maintain MAP > 65 and SVO2 > 65   - cont. amio       Pulm   Acute Postoperative respiratory failure   Suspected Pulmonary emboli secondary to covid 19   - continue vent support   - on veletri   - starting heparin drip for PE       GI   - on famotidine       Renal   SERGE   - Maintain perfusion and avoid any nephrotoxic agents. - replete K and Mg as needed       Endo   Hyperglycemia   Hypothyroidism   - continue insulin drip, BS goal    - cont levothyroxine       ID   Covid 19   - Based on presentation does not appear to be active Covid disease but sequelae from recent exposure causing hypercoaguable state leading to PE. Will hold on steroids and additional treatment at this time.   - Rapid covid returned positive but PCR returned negative. Repeating PCR covid       Heme   -Heparin drip       CARDIOTHORACIC SURGERY:   1. Severe symptomatic AS, s/p tissue AVR. Continue ASA. 2. CAD, STEMI, s/p CABG. Continue ASA, amio, statin. Consider BB when off pressors. 3. Acute hypoxic respiratory failure: Patient developed hypoxia at approximately 11pm last night requiring Bipap and was ultimately re-intubated at 0200 for acute respiratory failure. Bronchoscopy was completed and was unremarkable. CXR this am with small left effusion and atelectasis with slight pulmonary edema. CT output 450cc/24, no air leak. Stat TTE was attempted, however due to the patients body habitus views were limited. CXR  SYLWIA was completed this am with normal RV and LVF without effusion or shunting. CTA was no completed due to SERGE, He remains hypoxic, hypercarbic and slightly acidotic this am on [facility] with 450 tv, 12 of peep and 100% FiO2.   Will Start heparin gtt this am for possible PE, may consider CT for PE once patient is more stable. May consider steroids and antiviral for COVID. Gentle diuresis as needed. BLE US ordered. 4. Acute kidney injury. Creatinine 2.01>1.79. avoid nephrotoxins, trend. 5. Acute blood loss anemia: H&H 9.1/28. 7. Monitor. 6. Transaminitis. Avoid hepatotoxins, trend. 7. Leukocytosis. WBC 20.6>18.6. Afebrile. Likely atelectasis vs possible PE, Sputum culture sent. 8. Post-op pain control. Continue Dilaudid PCA. 9. HTN. On ACEI, BB PTA; resume BB when appropriate. 10. HLD. Continue statin. 11. WALLY , not on CPAP. Was unable to tolerate due to anxiety; he has been working on this with Sleep Medicine. OP follow-up. 12. DMII. On Toujeo at home. Repeat A1C 9.0- Insulin gtt per protocol. 13. Hypothyroidism. On Synthroid PTA; continue. Repeat TSH 1.96.    14. GERD. Continue PPI. 15. Anxiety and depression. On Buspar, Zoloft; continue. Supportive care. 16. Obesity. BMI 40.53%. Diet and exercise counseling. DVT ppx- SCDs, heparin. Dispo- ICU today        NEPHROLOGY:   SERGE likely due to ATN from hypotension. UA with protein and blood. He has a pennington. Hold on renal imaging. Fair  UO. He appears to be volume overloaded. There is no acute need for dialysis. Creatinine about the same. OK to diurese as needed. Potassium better. Check BMP this pm.       Patient is critically ill and at significant risk of deterioration. Medications:   Precedex 0.4-1 mcg/kg/hr IV titrate, Adrenalin 4-3 mcg/min IV titrate, Veletri 30 ng/kg/min IN, Fentanyl  mcg/hr IV titrate, Heparin 16-18 units/kg/hr IV titrate, Insulin regular 1-25 units/hr IV titrate, Lasix 80mg IV once      Orders:   -Droplet Plus Isolation   -Contact Isolation   -Daily CBC while on heparin. If platelets less than [phone] thrombocytes/mcL or decrease by 50% of baseline, notify physician.

## 2022-12-01 NOTE — INTERDISCIPLINARY ROUNDS
Interdisciplinary team rounds were held 12/1/2022 with the following team members:Care Management, Diabetes Treatment Specialist, Nursing, Nutrition, Pharmacy, Physician, Respiratory Therapy, and Clinical Coordinator. Plan of care discussed. See clinical pathway and/or care plan for interventions and desired outcomes. Goals of the Day: Medications discussed.

## 2022-12-01 NOTE — PROGRESS NOTES
2300  Peep decreased to 5. Sats remain 93-94%. Bathed. Linens changed. Chest tube dressing changed and pacer care performed    0610  Sats drop to87% and consistently remain at 89%. NP increased Peep back to 8. Sats slow to return to 90% Epi 1 mcg. Raymond 20 mcg.  58 731 cc/ 12 hr.      0630  Agitated. Beating hands on siderails and kicking feet. Sats drop again to 87%. Propofol increased to 30.   Temp increasing to 1

## 2022-12-02 ENCOUNTER — APPOINTMENT (OUTPATIENT)
Dept: GENERAL RADIOLOGY | Age: 42
End: 2022-12-02
Attending: PHYSICIAN ASSISTANT
Payer: COMMERCIAL

## 2022-12-02 LAB
1,3 BETA GLUCAN SER-MCNC: <31 PG/ML
ABO + RH BLD: NORMAL
ALBUMIN SERPL-MCNC: 2.4 G/DL (ref 3.5–5)
ALBUMIN/GLOB SERPL: 0.6 {RATIO} (ref 1.1–2.2)
ALP SERPL-CCNC: 297 U/L (ref 45–117)
ALPHA-GAL IGE QN: <0.1 KU/L
ALT SERPL-CCNC: 388 U/L (ref 12–78)
ANION GAP SERPL CALC-SCNC: 8 MMOL/L (ref 5–15)
AST SERPL-CCNC: 102 U/L (ref 15–37)
BACTERIA SPEC CULT: NORMAL
BASOPHILS # BLD: 0.2 K/UL (ref 0–0.1)
BASOPHILS NFR BLD: 1 % (ref 0–1)
BEEF IGE QN: <0.1 KU/L
BILIRUB DIRECT SERPL-MCNC: 0.3 MG/DL (ref 0–0.2)
BILIRUB SERPL-MCNC: 0.6 MG/DL (ref 0.2–1)
BLD PROD TYP BPU: NORMAL
BLOOD GROUP ANTIBODIES SERPL: NORMAL
BPU ID: NORMAL
BUN SERPL-MCNC: 49 MG/DL (ref 6–20)
BUN/CREAT SERPL: 39 (ref 12–20)
CALCIUM SERPL-MCNC: 8.7 MG/DL (ref 8.5–10.1)
CHLORIDE SERPL-SCNC: 110 MMOL/L (ref 97–108)
CLASS DESCRIPTION, 600268: NORMAL
CO2 SERPL-SCNC: 30 MMOL/L (ref 21–32)
CREAT SERPL-MCNC: 1.27 MG/DL (ref 0.7–1.3)
CROSSMATCH RESULT,%XM: NORMAL
DIFFERENTIAL METHOD BLD: ABNORMAL
EOSINOPHIL # BLD: 0.8 K/UL (ref 0–0.4)
EOSINOPHIL NFR BLD: 5 % (ref 0–7)
ERYTHROCYTE [DISTWIDTH] IN BLOOD BY AUTOMATED COUNT: 15.7 % (ref 11.5–14.5)
GLOBULIN SER CALC-MCNC: 3.7 G/DL (ref 2–4)
GLUCOSE BLD STRIP.AUTO-MCNC: 113 MG/DL (ref 65–117)
GLUCOSE BLD STRIP.AUTO-MCNC: 125 MG/DL (ref 65–117)
GLUCOSE BLD STRIP.AUTO-MCNC: 185 MG/DL (ref 65–117)
GLUCOSE BLD STRIP.AUTO-MCNC: 211 MG/DL (ref 65–117)
GLUCOSE BLD STRIP.AUTO-MCNC: 78 MG/DL (ref 65–117)
GLUCOSE SERPL-MCNC: 132 MG/DL (ref 65–100)
GRAM STN SPEC: NORMAL
HCT VFR BLD AUTO: 28.9 % (ref 36.6–50.3)
HDV AB SER QL IA: NEGATIVE
HGB BLD-MCNC: 8.8 G/DL (ref 12.1–17)
IGE SERPL-ACNC: 11 IU/ML (ref 6–495)
IMM GRANULOCYTES # BLD AUTO: 0 K/UL (ref 0–0.04)
IMM GRANULOCYTES NFR BLD AUTO: 0 % (ref 0–0.5)
INR PPP: 1.2 (ref 0.9–1.1)
LAMB/MUTTON (OVIS SPP) IGE: <0.1 KU/L
LYMPHOCYTES # BLD: 3.3 K/UL (ref 0.8–3.5)
LYMPHOCYTES NFR BLD: 21 % (ref 12–49)
MAGNESIUM SERPL-MCNC: 2 MG/DL (ref 1.6–2.4)
MCH RBC QN AUTO: 26.4 PG (ref 26–34)
MCHC RBC AUTO-ENTMCNC: 30.4 G/DL (ref 30–36.5)
MCV RBC AUTO: 86.8 FL (ref 80–99)
MONOCYTES # BLD: 0.8 K/UL (ref 0–1)
MONOCYTES NFR BLD: 5 % (ref 5–13)
NEUTS SEG # BLD: 10.4 K/UL (ref 1.8–8)
NEUTS SEG NFR BLD: 68 % (ref 32–75)
NRBC # BLD: 0.02 K/UL (ref 0–0.01)
NRBC BLD-RTO: 0.1 PER 100 WBC
PLATELET # BLD AUTO: 262 K/UL (ref 150–400)
PMV BLD AUTO: 9.3 FL (ref 8.9–12.9)
PORK IGE QN: <0.1 KU/L
POTASSIUM SERPL-SCNC: 3.6 MMOL/L (ref 3.5–5.1)
PROCALCITONIN SERPL-MCNC: 0.55 NG/ML
PROT SERPL-MCNC: 6.1 G/DL (ref 6.4–8.2)
PROTHROMBIN TIME: 12.2 SEC (ref 9–11.1)
RBC # BLD AUTO: 3.33 M/UL (ref 4.1–5.7)
RBC MORPH BLD: ABNORMAL
SERVICE CMNT-IMP: ABNORMAL
SERVICE CMNT-IMP: NORMAL
SODIUM SERPL-SCNC: 148 MMOL/L (ref 136–145)
SPECIMEN EXP DATE BLD: NORMAL
STATUS OF UNIT,%ST: NORMAL
UNIT DIVISION, %UDIV: 0
WBC # BLD AUTO: 15.5 K/UL (ref 4.1–11.1)

## 2022-12-02 PROCEDURE — 74011250636 HC RX REV CODE- 250/636: Performed by: INTERNAL MEDICINE

## 2022-12-02 PROCEDURE — 94640 AIRWAY INHALATION TREATMENT: CPT

## 2022-12-02 PROCEDURE — 83735 ASSAY OF MAGNESIUM: CPT

## 2022-12-02 PROCEDURE — 74011250636 HC RX REV CODE- 250/636: Performed by: THORACIC SURGERY (CARDIOTHORACIC VASCULAR SURGERY)

## 2022-12-02 PROCEDURE — 82962 GLUCOSE BLOOD TEST: CPT

## 2022-12-02 PROCEDURE — 97530 THERAPEUTIC ACTIVITIES: CPT | Performed by: PHYSICAL THERAPIST

## 2022-12-02 PROCEDURE — 65610000006 HC RM INTENSIVE CARE

## 2022-12-02 PROCEDURE — 36415 COLL VENOUS BLD VENIPUNCTURE: CPT

## 2022-12-02 PROCEDURE — 99233 SBSQ HOSP IP/OBS HIGH 50: CPT | Performed by: INTERNAL MEDICINE

## 2022-12-02 PROCEDURE — 74011636637 HC RX REV CODE- 636/637: Performed by: HOSPITALIST

## 2022-12-02 PROCEDURE — 74011250637 HC RX REV CODE- 250/637: Performed by: PHYSICIAN ASSISTANT

## 2022-12-02 PROCEDURE — 74011000258 HC RX REV CODE- 258: Performed by: NURSE PRACTITIONER

## 2022-12-02 PROCEDURE — 74011000250 HC RX REV CODE- 250: Performed by: ANESTHESIOLOGY

## 2022-12-02 PROCEDURE — C9113 INJ PANTOPRAZOLE SODIUM, VIA: HCPCS | Performed by: INTERNAL MEDICINE

## 2022-12-02 PROCEDURE — 97116 GAIT TRAINING THERAPY: CPT | Performed by: PHYSICAL THERAPIST

## 2022-12-02 PROCEDURE — 99233 SBSQ HOSP IP/OBS HIGH 50: CPT | Performed by: CLINICAL NURSE SPECIALIST

## 2022-12-02 PROCEDURE — 80053 COMPREHEN METABOLIC PANEL: CPT

## 2022-12-02 PROCEDURE — 94660 CPAP INITIATION&MGMT: CPT

## 2022-12-02 PROCEDURE — 85610 PROTHROMBIN TIME: CPT

## 2022-12-02 PROCEDURE — 97110 THERAPEUTIC EXERCISES: CPT | Performed by: OCCUPATIONAL THERAPIST

## 2022-12-02 PROCEDURE — 84145 PROCALCITONIN (PCT): CPT

## 2022-12-02 PROCEDURE — 97168 OT RE-EVAL EST PLAN CARE: CPT | Performed by: OCCUPATIONAL THERAPIST

## 2022-12-02 PROCEDURE — 74011000250 HC RX REV CODE- 250: Performed by: INTERNAL MEDICINE

## 2022-12-02 PROCEDURE — 74011000250 HC RX REV CODE- 250: Performed by: PHYSICIAN ASSISTANT

## 2022-12-02 PROCEDURE — 74011000258 HC RX REV CODE- 258: Performed by: THORACIC SURGERY (CARDIOTHORACIC VASCULAR SURGERY)

## 2022-12-02 PROCEDURE — 74011250637 HC RX REV CODE- 250/637: Performed by: INTERNAL MEDICINE

## 2022-12-02 PROCEDURE — 82248 BILIRUBIN DIRECT: CPT

## 2022-12-02 PROCEDURE — 77010033711 HC HIGH FLOW OXYGEN

## 2022-12-02 PROCEDURE — 92610 EVALUATE SWALLOWING FUNCTION: CPT

## 2022-12-02 PROCEDURE — 74011000250 HC RX REV CODE- 250: Performed by: NURSE PRACTITIONER

## 2022-12-02 PROCEDURE — 85025 COMPLETE CBC W/AUTO DIFF WBC: CPT

## 2022-12-02 PROCEDURE — 97110 THERAPEUTIC EXERCISES: CPT | Performed by: PHYSICAL THERAPIST

## 2022-12-02 PROCEDURE — 71045 X-RAY EXAM CHEST 1 VIEW: CPT

## 2022-12-02 PROCEDURE — 74011250637 HC RX REV CODE- 250/637: Performed by: NURSE PRACTITIONER

## 2022-12-02 PROCEDURE — 97164 PT RE-EVAL EST PLAN CARE: CPT | Performed by: PHYSICAL THERAPIST

## 2022-12-02 PROCEDURE — 74011000258 HC RX REV CODE- 258: Performed by: INTERNAL MEDICINE

## 2022-12-02 RX ORDER — POTASSIUM CHLORIDE 29.8 MG/ML
20 INJECTION INTRAVENOUS
Status: COMPLETED | OUTPATIENT
Start: 2022-12-02 | End: 2022-12-03

## 2022-12-02 RX ORDER — FUROSEMIDE 10 MG/ML
40 INJECTION INTRAMUSCULAR; INTRAVENOUS EVERY 8 HOURS
Status: DISCONTINUED | OUTPATIENT
Start: 2022-12-02 | End: 2022-12-05

## 2022-12-02 RX ORDER — MAGNESIUM SULFATE 1 G/100ML
1 INJECTION INTRAVENOUS ONCE
Status: COMPLETED | OUTPATIENT
Start: 2022-12-02 | End: 2022-12-03

## 2022-12-02 RX ORDER — LORAZEPAM 2 MG/ML
0.5 INJECTION INTRAMUSCULAR
Status: DISCONTINUED | OUTPATIENT
Start: 2022-12-02 | End: 2022-12-09 | Stop reason: HOSPADM

## 2022-12-02 RX ORDER — IPRATROPIUM BROMIDE AND ALBUTEROL SULFATE 2.5; .5 MG/3ML; MG/3ML
3 SOLUTION RESPIRATORY (INHALATION)
Status: DISCONTINUED | OUTPATIENT
Start: 2022-12-02 | End: 2022-12-07

## 2022-12-02 RX ADMIN — POTASSIUM CHLORIDE 20 MEQ: 29.8 INJECTION INTRAVENOUS at 09:16

## 2022-12-02 RX ADMIN — CASTOR OIL AND BALSAM, PERU: 788; 87 OINTMENT TOPICAL at 09:17

## 2022-12-02 RX ADMIN — Medication 3 UNITS: at 17:20

## 2022-12-02 RX ADMIN — INSULIN GLARGINE 70 UNITS: 100 INJECTION, SOLUTION SUBCUTANEOUS at 23:27

## 2022-12-02 RX ADMIN — HYDROMORPHONE HYDROCHLORIDE 0.5 MG: 1 INJECTION, SOLUTION INTRAMUSCULAR; INTRAVENOUS; SUBCUTANEOUS at 17:22

## 2022-12-02 RX ADMIN — LORAZEPAM 0.5 MG: 2 INJECTION INTRAMUSCULAR; INTRAVENOUS at 16:43

## 2022-12-02 RX ADMIN — POTASSIUM CHLORIDE 20 MEQ: 29.8 INJECTION INTRAVENOUS at 06:51

## 2022-12-02 RX ADMIN — HYDROMORPHONE HYDROCHLORIDE 0.5 MG: 1 INJECTION, SOLUTION INTRAMUSCULAR; INTRAVENOUS; SUBCUTANEOUS at 13:21

## 2022-12-02 RX ADMIN — POLYETHYLENE GLYCOL 3350 17 G: 17 POWDER, FOR SOLUTION ORAL at 09:16

## 2022-12-02 RX ADMIN — VANCOMYCIN HYDROCHLORIDE 1250 MG: 10 INJECTION, POWDER, LYOPHILIZED, FOR SOLUTION INTRAVENOUS at 04:03

## 2022-12-02 RX ADMIN — ASPIRIN 81 MG CHEWABLE TABLET 81 MG: 81 TABLET CHEWABLE at 09:15

## 2022-12-02 RX ADMIN — Medication 1.1 MCG/KG/HR: at 00:43

## 2022-12-02 RX ADMIN — HYDROMORPHONE HYDROCHLORIDE 0.5 MG: 1 INJECTION, SOLUTION INTRAMUSCULAR; INTRAVENOUS; SUBCUTANEOUS at 09:16

## 2022-12-02 RX ADMIN — MAGNESIUM SULFATE HEPTAHYDRATE 1 G: 1 INJECTION, SOLUTION INTRAVENOUS at 06:56

## 2022-12-02 RX ADMIN — SODIUM CHLORIDE 100 MG: 9 INJECTION, SOLUTION INTRAVENOUS at 11:12

## 2022-12-02 RX ADMIN — HEPARIN SODIUM 5000 UNITS: 5000 INJECTION INTRAVENOUS; SUBCUTANEOUS at 23:30

## 2022-12-02 RX ADMIN — SODIUM CHLORIDE, PRESERVATIVE FREE 10 ML: 5 INJECTION INTRAVENOUS at 21:27

## 2022-12-02 RX ADMIN — HEPARIN SODIUM 5000 UNITS: 5000 INJECTION INTRAVENOUS; SUBCUTANEOUS at 16:44

## 2022-12-02 RX ADMIN — HYDROMORPHONE HYDROCHLORIDE 0.5 MG: 1 INJECTION, SOLUTION INTRAMUSCULAR; INTRAVENOUS; SUBCUTANEOUS at 04:20

## 2022-12-02 RX ADMIN — METOCLOPRAMIDE 5 MG: 5 INJECTION, SOLUTION INTRAMUSCULAR; INTRAVENOUS at 12:15

## 2022-12-02 RX ADMIN — FUROSEMIDE 10 MG/HR: 10 INJECTION, SOLUTION INTRAMUSCULAR; INTRAVENOUS at 04:40

## 2022-12-02 RX ADMIN — BUSPIRONE HYDROCHLORIDE 15 MG: 5 TABLET ORAL at 14:09

## 2022-12-02 RX ADMIN — FUROSEMIDE 40 MG: 10 INJECTION, SOLUTION INTRAMUSCULAR; INTRAVENOUS at 09:15

## 2022-12-02 RX ADMIN — SODIUM CHLORIDE, PRESERVATIVE FREE 10 ML: 5 INJECTION INTRAVENOUS at 14:09

## 2022-12-02 RX ADMIN — MEROPENEM 1 G: 1 INJECTION, POWDER, FOR SOLUTION INTRAVENOUS at 01:58

## 2022-12-02 RX ADMIN — FUROSEMIDE 40 MG: 10 INJECTION, SOLUTION INTRAMUSCULAR; INTRAVENOUS at 14:09

## 2022-12-02 RX ADMIN — BUSPIRONE HYDROCHLORIDE 15 MG: 5 TABLET ORAL at 21:26

## 2022-12-02 RX ADMIN — LEVOTHYROXINE SODIUM 150 MCG: 0.07 TABLET ORAL at 05:56

## 2022-12-02 RX ADMIN — SENNOSIDES AND DOCUSATE SODIUM 2 TABLET: 50; 8.6 TABLET ORAL at 09:15

## 2022-12-02 RX ADMIN — CASTOR OIL AND BALSAM, PERU: 788; 87 OINTMENT TOPICAL at 21:26

## 2022-12-02 RX ADMIN — LORAZEPAM 1 MG: 1 TABLET ORAL at 00:11

## 2022-12-02 RX ADMIN — HYDROMORPHONE HYDROCHLORIDE 0.5 MG: 1 INJECTION, SOLUTION INTRAMUSCULAR; INTRAVENOUS; SUBCUTANEOUS at 21:35

## 2022-12-02 RX ADMIN — MEROPENEM 1 G: 1 INJECTION, POWDER, FOR SOLUTION INTRAVENOUS at 10:07

## 2022-12-02 RX ADMIN — BISACODYL 10 MG: 10 SUPPOSITORY RECTAL at 09:15

## 2022-12-02 RX ADMIN — HEPARIN SODIUM 5000 UNITS: 5000 INJECTION INTRAVENOUS; SUBCUTANEOUS at 09:15

## 2022-12-02 RX ADMIN — SERTRALINE 100 MG: 50 TABLET, FILM COATED ORAL at 09:15

## 2022-12-02 RX ADMIN — MEROPENEM 1 G: 1 INJECTION, POWDER, FOR SOLUTION INTRAVENOUS at 17:57

## 2022-12-02 RX ADMIN — FUROSEMIDE 40 MG: 10 INJECTION, SOLUTION INTRAMUSCULAR; INTRAVENOUS at 21:26

## 2022-12-02 RX ADMIN — Medication 4 UNITS: at 23:29

## 2022-12-02 RX ADMIN — METOCLOPRAMIDE 5 MG: 5 INJECTION, SOLUTION INTRAMUSCULAR; INTRAVENOUS at 17:20

## 2022-12-02 RX ADMIN — METOCLOPRAMIDE 5 MG: 5 INJECTION, SOLUTION INTRAMUSCULAR; INTRAVENOUS at 23:31

## 2022-12-02 RX ADMIN — SODIUM CHLORIDE 40 MG: 9 INJECTION, SOLUTION INTRAMUSCULAR; INTRAVENOUS; SUBCUTANEOUS at 09:16

## 2022-12-02 RX ADMIN — BUSPIRONE HYDROCHLORIDE 15 MG: 5 TABLET ORAL at 09:15

## 2022-12-02 RX ADMIN — Medication 1 MCG/KG/HR: at 06:40

## 2022-12-02 RX ADMIN — IPRATROPIUM BROMIDE AND ALBUTEROL SULFATE 3 ML: 2.5; .5 SOLUTION RESPIRATORY (INHALATION) at 21:20

## 2022-12-02 RX ADMIN — METOCLOPRAMIDE 5 MG: 5 INJECTION, SOLUTION INTRAMUSCULAR; INTRAVENOUS at 05:59

## 2022-12-02 RX ADMIN — SODIUM CHLORIDE, PRESERVATIVE FREE 10 ML: 5 INJECTION INTRAVENOUS at 06:04

## 2022-12-02 NOTE — PROGRESS NOTES
0700: Bedside and Verbal shift change report given to Kasia Schneider and [facility] RN (oncoming nurse) by Hayley Palacio (offgoing nurse). Report included the following information SBAR, Kardex, Intake/Output, MAR, Recent Results, and Cardiac Rhythm NSR .      1020: interdisciplinary rounds completed with attending, dietary, diabetes management, case management, and pharmacy    1106: tube feedings started. Art line removed     1153: speech therapy at bedside    1220: LVM for diabetes care regarding BG 78    1430: PT/OT at bedside. Pt up to chair with two assist      1930: Bedside and Verbal shift change report given to [employer] and Maryan RN (oncoming nurse) by Jimbo Julian RN (offgoing nurse). Report included the following information SBAR, Kardex, Intake/Output, MAR, Recent Results, and Cardiac Rhythm nsr .

## 2022-12-02 NOTE — PROGRESS NOTES
Critical Care Progress Note  Jesica Potter MD          Date of Service:  2022  NAME:  Juana Acosta  :  1980  MRN:  [id number]      Subjective/Hospital course:      37 y/o male POD 1 from CABG x1 an AV valve replacement. He arrived intubated and on pressors. Extubated  on POD 0 @ 19:00. Noted to have SERGE with creatinine rise 2. Possibly secondary to hypotension in OR. Night of  was worked up for hypoxemia. This morning he tested positive for Covid. Case discussed with cT surgery. Most likely diagnosis is PE most likely secondary to hypercoaguable state from Covid. Pt had no respiratory symptoms preop or post op suggestive of URI      - Overnight developed hypoxemia. Pt had bronch and SYLWIA neither of which explained cause for hypoxemia. He required re-intubation and was put on veletri.  - intermittent desaturation events. CXR this a.m. with pulmonary edema   - fio2 improved   : fio2 and PEEP requirement has increased, still requiring epi at 2 mcg/min   : remains on epinephrine drip, sedated, fio2 requirement decreased to 80%'LFT's are trending   Down   : remains on epi drip, diueresed well with lasix drip,fio2 decreased to 50% and peep to 6  : Patient remains intubated and sedated. Being diuresed with lasix gtt. : Remains intubated, sedation is being weaned, able to follow commands. : Reports feeling better, extubated on . On high flow oxygen. Assessment/Plan:     PULMONARY:  Acute hypoxic resopiratory failure   Covid rapid +, PCR negative  D dimer / LE doppler negative - unlikely PE  Pulmonary edema/ pleural effusions  Ct chest : no PE  Plan  - stop heparin - discussed with CTS  - Vent settings established, reviewed and/or adjusted as per orders  - Continue nebulized bronchodilators and steroids  - Continue diuresis as tolerated. CARDIOVASCULAR/HEMODYNAMIC:  Off pressors.     Plan    - ICU hemodynamic/cardiac monitoring  - MAP goal > 65 mmHg      RENAL:  SERGE    Plan  - Monitor I/Os and Uo  - Monitor renal function panel intermittently  - Correct electrolytes as needed  - Avoid nephrotoxic meds      GI/NUTRITION:  CT abdomen : normal liver vasculature  Hepatic steatosis : suspect cholestasis as the cause for the elevated LFT's  Continue reglan  Laxatives to move bowels  Trickle feeds and also eval for swallowing. TF can stopped if he starts eating adequately. INFECTIOUS DISEASE  Covid ruled out with 2 negative PCR  Intermittent fevers  Cultures pending   Pro rosy is only slightly elevated    Micro:    Plan  - Continue broad spectrum abx   Add eraxis  ID following  ? Non infectious. Consider stopping vanc and eraxis. Cx neg. NEURO  Sedation for vent synchrony    ENDO : TSH is elevated  Increase synthroid to 150 mcg once a day    Plan    - Daily SAT when meets ICU criteria  - ABCDEF mobility bundle  - PT/OT involvement when appropriate          Care Plan discussed with: attending, CTS NP and bedside nurse, GI , radiology    I personally spent 40 minutes of critical care time. This is time spent at this critically ill patient's bedside actively involved in patient care as well as the coordination of care and discussions with the patient's family. This does not include any procedural time which has been billed separately.       Review of Systems:   ROS   Intubated     Vital Signs:   Patient Vitals for the past 4 hrs:   BP Temp Pulse Resp SpO2   12/02/22 1000 (!) 102/55 100.4 °F (38 °C) 66 (!) 0 95 %   12/02/22 0914 -- -- -- -- 95 %   12/02/22 0900 (!) 101/59 (!) 100.6 °F (38.1 °C) 67 20 95 %   12/02/22 0800 (!) 101/57 (!) 100.6 °F (38.1 °C) 67 (!) 34 93 %          Intake/Output Summary (Last 24 hours) at 12/2/2022 1128  Last data filed at 12/2/2022 1400  Gross per 24 hour   Intake 1740.99 ml   Output 3575 ml   Net -1834.01 ml          Physical Examination:    Young, well built  HEENT: normal  Heart: s1,s2  Lungs: clear  Abd: distended, no bowel sounds  Ext: no edema  Cns: sedated     Labs and Imaging:   Reviewed.       Medications:     Current Facility-Administered Medications   Medication Dose Route Frequency    furosemide (LASIX) injection 40 mg  40 mg IntraVENous Q8H    LORazepam (ATIVAN) injection 0.5 mg  0.5 mg IntraVENous Q8H PRN    albuterol-ipratropium (DUO-NEB) 2.5 MG-0.5 MG/3 ML  3 mL Nebulization BID    scopolamine (TRANSDERM-SCOP) 1 mg over 3 days 1 Patch  1 Patch TransDERmal Q72H    insulin glargine (LANTUS) injection 70 Units  70 Units SubCUTAneous Q12H    insulin lispro (HUMALOG) injection   SubCUTAneous Q6H    glucose chewable tablet 16 g  4 Tablet Oral PRN    0.9% sodium chloride infusion 250 mL  250 mL IntraVENous PRN    bisacodyL (DULCOLAX) suppository 10 mg  10 mg Rectal DAILY    senna-docusate (PERICOLACE) 8.6-50 mg per tablet 2 Tablet  2 Tablet Oral DAILY    levothyroxine (SYNTHROID) tablet 150 mcg  150 mcg Oral 6am    balsam peru-castor oiL (VENELEX) ointment   Topical BID    anidulafungin (ERAXIS) 100 mg in 0.9% sodium chloride 130 mL IVPB  100 mg IntraVENous Q24H    heparin (porcine) injection 5,000 Units  5,000 Units SubCUTAneous Q8H    meropenem (MERREM) 1 g in 0.9% sodium chloride (MBP/ADV) 50 mL MBP  1 g IntraVENous Q8H    vancomycin (VANCOCIN) 1250 mg in  ml infusion  1,250 mg IntraVENous Q12H    metoclopramide HCl (REGLAN) injection 5 mg  5 mg IntraVENous Q6H    pantoprazole (PROTONIX) 40 mg in 0.9% sodium chloride 10 mL injection  40 mg IntraVENous DAILY    dexmedeTOMidine in 0.9 % NaCl (PRECEDEX) 400 mcg/100 mL (4 mcg/mL) infusion soln  0.1-1.5 mcg/kg/hr IntraVENous TITRATE    0.9% sodium chloride infusion  3 mL/hr IntraVENous CONTINUOUS    HYDROmorphone (DILAUDID) injection 1 mg  1 mg IntraVENous Q4H PRN    HYDROmorphone (DILAUDID) injection 0.5 mg  0.5 mg IntraVENous Q4H PRN    sodium chloride (NS) flush 5-40 mL  5-40 mL IntraVENous Q8H    sodium chloride (NS) flush 5-40 mL  5-40 mL IntraVENous PRN    bacitracin 500 unit/gram packet 1 Packet  1 Packet Topical PRN    0.45% sodium chloride infusion  10 mL/hr IntraVENous CONTINUOUS    naloxone (NARCAN) injection 0.4 mg  0.4 mg IntraVENous PRN    ondansetron (ZOFRAN) injection 4 mg  4 mg IntraVENous Q4H PRN    albuterol (PROVENTIL VENTOLIN) nebulizer solution 2.5 mg  2.5 mg Nebulization Q4H PRN    aspirin chewable tablet 81 mg  81 mg Oral DAILY    calcium chloride 1 g in 0.9% sodium chloride 250 mL IVPB  1 g IntraVENous PRN    polyethylene glycol (MIRALAX) packet 17 g  17 g Oral DAILY    ELECTROLYTE REPLACEMENT NOTE: Nurse to review Serum Potassium and Magnesuim levels and Initiate Electrolyte Replacement Protocol as needed  1 Each Other PRN    magnesium sulfate 1 g/100 ml IVPB (premix or compounded)  1 g IntraVENous PRN    glucagon (GLUCAGEN) injection 1 mg  1 mg IntraMUSCular PRN    lactated ringers bolus infusion 250 mL  250 mL IntraVENous Q1H PRN    dextrose 10 % infusion 0-250 mL  0-250 mL IntraVENous PRN    melatonin tablet 3-6 mg  3-6 mg Oral QHS PRN    lidocaine 4 % patch 2 Patch  2 Patch TransDERmal Q24H    LORazepam (ATIVAN) tablet 1 mg  1 mg Oral Q8H PRN    sertraline (ZOLOFT) tablet 100 mg  100 mg Oral DAILY    busPIRone (BUSPAR) tablet 15 mg  15 mg Oral TID     ______________________________________________________________________  EXPECTED LENGTH OF STAY: 1d 19h  ACTUAL LENGTH OF STAY:          [address] Nikolas, MD   Pulmonary/CCM  [phone]

## 2022-12-02 NOTE — PROGRESS NOTES
Kristine from [address] [place] [address] lab called about the HCV-RNA labs sent on 11/27 at 1534 for not enough sample received. Need new sample and lab orders. Primary RN Abdoul Rodriguez notified.

## 2022-12-02 NOTE — PROGRESS NOTES
1910- Bedside shift change report given to CAMI Strauss RN and Boubacar Sheffield RN (oncoming nurses) by Remy Fonseca RN (offgoing nurse). Report included the following information SBAR, Kardex, OR Summary, Procedure Summary, Intake/Output, Recent Results, Med Rec Status, and Cardiac Rhythm NSR .      2000- Shift assessment completed. Patient alert and oriented x4 but drowsy. Follows all commands. PERRLA. NSR on the monitor. Pedal and radial pulses palpable. Edema noted in all extremities. On high flow nasal cannula, breath sounds coarse bilaterally. NGT R nare clamped. Strong catheter in place, draining appropriately. Lines:  PIX x2  MAC double lumen  R radial A line    Drips:  Lasix gtt  Precedex gtt    2100- Spoke with Noel Marley NP about patients UOP and lasix gtt. Will await new orders. 2110Lafifi Jean NP at bedside with patient. 0000- Reassessment completed. No changes noted. 0011- PRN ativan given at this time for anxiety. 0115- Requesting BiPAP; Respiratory and Manual LATASHA Millard notified. Respiratory placed bipap on w/ 50% FiO2.    0400- Reassessment completed. No changes noted. 7991- Dilaudid given at this time for pain. 7496- Respiratory at bedside, bipap removed and hhfnc placed on for day. Patient placed in chair position in bed.     0650- Potassium 3.6, Magnesium 2.0. 2 runs of potassium 20meq/50ml given per electrolyte replacement protocol. 1g magnesium given per electrolyte replacement protocol. 0730- Bedside shift change report given to Meredith Higgins RN and Claire Sanford RN (oncoming nurses) by CAMI Strauss RN and Boubacar Sheffield RN (offgoing nurses). Report included the following information SBAR, Kardex, ED Summary, OR Summary, MAR, Med Rec Status, Cardiac Rhythm nsr, and Alarm Parameters .

## 2022-12-02 NOTE — PROGRESS NOTES
Gastroenterology Daily Progress Note   LOLLY Jones for Dr. Kristie Sawant)   [address] [place] [address] Dr Cerrato Date: 11/15/2022     Follow up of hepatitis    Subjective:       Extubated yesterday, on high flow oxygen  WBC improved to 15.5, no bands, eos improved, cx neg so far  ID following  On eraxis, vancomycin and meopenam    EBV c/w prior infection  HSV negative   Acute hepatitis panel and hep B quant neg  CMV IgG and IgM neg  WENDY, AMA, ASMA neg  IgG panel not elevatd    INR stable at 1.2  T bili normal at 0.6  ALT improved to 388  AST improved to 102  Alk phos imprpoved to 297    KUB: Mild colon distention with prominent fecal stasis. No significant small bowel  gas.      Had a large loose stool yesterday after enema  Getting dulcolax supp, miralax and pericolace    Current Facility-Administered Medications   Medication Dose Route Frequency    potassium chloride 20 mEq in 50 ml IVPB  20 mEq IntraVENous Q2H    furosemide (LASIX) injection 40 mg  40 mg IntraVENous Q8H    LORazepam (ATIVAN) injection 0.5 mg  0.5 mg IntraVENous Q8H PRN    scopolamine (TRANSDERM-SCOP) 1 mg over 3 days 1 Patch  1 Patch TransDERmal Q72H    insulin glargine (LANTUS) injection 70 Units  70 Units SubCUTAneous Q12H    insulin lispro (HUMALOG) injection   SubCUTAneous Q6H    glucose chewable tablet 16 g  4 Tablet Oral PRN    0.9% sodium chloride infusion 250 mL  250 mL IntraVENous PRN    bisacodyL (DULCOLAX) suppository 10 mg  10 mg Rectal DAILY    senna-docusate (PERICOLACE) 8.6-50 mg per tablet 2 Tablet  2 Tablet Oral DAILY    levothyroxine (SYNTHROID) tablet 150 mcg  150 mcg Oral 6am    balsam peru-castor oiL (VENELEX) ointment   Topical BID    anidulafungin (ERAXIS) 100 mg in 0.9% sodium chloride 130 mL IVPB  100 mg IntraVENous Q24H    heparin (porcine) injection 5,000 Units  5,000 Units SubCUTAneous Q8H    meropenem (MERREM) 1 g in 0.9% sodium chloride (MBP/ADV) 50 mL MBP  1 g IntraVENous Q8H    vancomycin (VANCOCIN) 1250 mg in  ml infusion  1,250 mg IntraVENous Q12H    metoclopramide HCl (REGLAN) injection 5 mg  5 mg IntraVENous Q6H    pantoprazole (PROTONIX) 40 mg in 0.9% sodium chloride 10 mL injection  40 mg IntraVENous DAILY    dexmedeTOMidine in 0.9 % NaCl (PRECEDEX) 400 mcg/100 mL (4 mcg/mL) infusion soln  0.1-1.5 mcg/kg/hr IntraVENous TITRATE    0.9% sodium chloride infusion  3 mL/hr IntraVENous CONTINUOUS    HYDROmorphone (DILAUDID) injection 1 mg  1 mg IntraVENous Q4H PRN    HYDROmorphone (DILAUDID) injection 0.5 mg  0.5 mg IntraVENous Q4H PRN    sodium chloride (NS) flush 5-40 mL  5-40 mL IntraVENous Q8H    sodium chloride (NS) flush 5-40 mL  5-40 mL IntraVENous PRN    bacitracin 500 unit/gram packet 1 Packet  1 Packet Topical PRN    0.45% sodium chloride infusion  10 mL/hr IntraVENous CONTINUOUS    naloxone (NARCAN) injection 0.4 mg  0.4 mg IntraVENous PRN    ondansetron (ZOFRAN) injection 4 mg  4 mg IntraVENous Q4H PRN    albuterol (PROVENTIL VENTOLIN) nebulizer solution 2.5 mg  2.5 mg Nebulization Q4H PRN    aspirin chewable tablet 81 mg  81 mg Oral DAILY    calcium chloride 1 g in 0.9% sodium chloride 250 mL IVPB  1 g IntraVENous PRN    polyethylene glycol (MIRALAX) packet 17 g  17 g Oral DAILY    ELECTROLYTE REPLACEMENT NOTE: Nurse to review Serum Potassium and Magnesuim levels and Initiate Electrolyte Replacement Protocol as needed  1 Each Other PRN    magnesium sulfate 1 g/100 ml IVPB (premix or compounded)  1 g IntraVENous PRN    glucagon (GLUCAGEN) injection 1 mg  1 mg IntraMUSCular PRN    lactated ringers bolus infusion 250 mL  250 mL IntraVENous Q1H PRN    dextrose 10 % infusion 0-250 mL  0-250 mL IntraVENous PRN    melatonin tablet 3-6 mg  3-6 mg Oral QHS PRN    lidocaine 4 % patch 2 Patch  2 Patch TransDERmal Q24H    LORazepam (ATIVAN) tablet 1 mg  1 mg Oral Q8H PRN    sertraline (ZOLOFT) tablet 100 mg  100 mg Oral DAILY    busPIRone (BUSPAR) tablet 15 mg  15 mg Oral TID        Objective: Visit Vitals  BP (!) 101/57 (BP 1 Location: Right upper arm, BP Patient Position: At rest)   Pulse 67   Temp (!) 100.6 °F (38.1 °C)   Resp (!) 34   Ht 5' 9\" (1.753 m)   Wt 114.7 kg (252 lb 13.9 oz)   SpO2 93%   BMI 37.34 kg/m²   Blood pressure (!) 101/57, pulse 67, temperature (!) 100.6 °F (38.1 °C), resp. rate (!) 34, height 5' 9\" (1.753 m), weight 114.7 kg (252 lb 13.9 oz), SpO2 93 %. 12/02 0701 - 12/02 1900  In: -   Out: 250 [Urine:250]    11/30 1901 - 12/02 0700  In: 3818.9 [I.V.:3298.9]  Out: 7100 [Urine:6800]      Intake/Output Summary (Last 24 hours) at 12/2/2022 0912  Last data filed at 12/2/2022 0800  Gross per 24 hour   Intake 2705.27 ml   Output 3825 ml   Net -1119.73 ml         Physical Exam:       General: WM, acutely ill, extubated on high flow oxygen, NGT clamped  HEENT: sclera anicteric, perrl  Chest:  CTA, No rhonchi, rales or rubs.   Heart: S1, S2, RRR  GI: distended, soft, +normal BS throughout   Extremities: no edema  CNS: awake, alert and oriented      Labs:       Recent Results (from the past 24 hour(s))   BLOOD GAS, ARTERIAL    Collection Time: 12/01/22 10:55 AM   Result Value Ref Range    pH 7.45 7.35 - 7.45      PCO2 34 (L) 35 - 45 mmHg    PO2 62 (L) 80 - 100 mmHg    O2 SAT 93 92 - 97 %    BICARBONATE 23 22 - 26 mmol/L    BASE DEFICIT 0.7 mmol/L    O2 METHOD VENT      FIO2 40 %    MODE OTHER      PRESSURE SUPPORT 5      PEEP/CPAP 5.0      Sample source ARTERIAL      SITE DRAWN FROM ARTERIAL LINE      MARSHA'S TEST NOT APPLICABLE     GLUCOSE, POC    Collection Time: 12/01/22 11:06 AM   Result Value Ref Range    Glucose (POC) 198 (H) 65 - 117 mg/dL    Performed by Andrzej Weber RN    METABOLIC PANEL, COMPREHENSIVE    Collection Time: 12/01/22  3:40 PM   Result Value Ref Range    Sodium 148 (H) 136 - 145 mmol/L    Potassium 3.3 (L) 3.5 - 5.1 mmol/L    Chloride 109 (H) 97 - 108 mmol/L    CO2 28 21 - 32 mmol/L    Anion gap 11 5 - 15 mmol/L    Glucose 113 (H) 65 - 100 mg/dL    BUN 42 (H) 6 - 20 MG/DL    Creatinine 1.08 0.70 - 1.30 MG/DL    BUN/Creatinine ratio 39 (H) 12 - 20      eGFR >60 >60 ml/min/1.73m2    Calcium 9.2 8.5 - 10.1 MG/DL    Bilirubin, total 1.1 (H) 0.2 - 1.0 MG/DL    ALT (SGPT) 518 (H) 12 - 78 U/L    AST (SGOT) 167 (H) 15 - 37 U/L    Alk. phosphatase 366 (H) 45 - 117 U/L    Protein, total 6.6 6.4 - 8.2 g/dL    Albumin 2.6 (L) 3.5 - 5.0 g/dL    Globulin 4.0 2.0 - 4.0 g/dL    A-G Ratio 0.7 (L) 1.1 - 2.2     MAGNESIUM    Collection Time: 12/01/22  3:40 PM   Result Value Ref Range    Magnesium 1.8 1.6 - 2.4 mg/dL   PHOSPHORUS    Collection Time: 12/01/22  3:40 PM   Result Value Ref Range    Phosphorus 4.4 2.6 - 4.7 MG/DL   GLUCOSE, POC    Collection Time: 12/01/22  5:29 PM   Result Value Ref Range    Glucose (POC) 92 65 - 117 mg/dL    Performed by Genesis Oconnell RN    GLUCOSE, POC    Collection Time: 12/01/22 11:30 PM   Result Value Ref Range    Glucose (POC) 123 (H) 65 - 117 mg/dL    Performed by Amanda Batista RN    CBC WITH AUTOMATED DIFF    Collection Time: 12/02/22  4:16 AM   Result Value Ref Range    WBC 15.5 (H) 4.1 - 11.1 K/uL    RBC 3.33 (L) 4.10 - 5.70 M/uL    HGB 8.8 (L) 12.1 - 17.0 g/dL    HCT 28.9 (L) 36.6 - 50.3 %    MCV 86.8 80.0 - 99.0 FL    MCH 26.4 26.0 - 34.0 PG    MCHC 30.4 30.0 - 36.5 g/dL    RDW 15.7 (H) 11.5 - 14.5 %    PLATELET 990 366 - 348 K/uL    MPV 9.3 8.9 - 12.9 FL    NRBC 0.1 (H) 0  WBC    ABSOLUTE NRBC 0.02 (H) 0.00 - 0.01 K/uL    NEUTROPHILS 68 32 - 75 %    LYMPHOCYTES 21 12 - 49 %    MONOCYTES 5 5 - 13 %    EOSINOPHILS 5 0 - 7 %    BASOPHILS 1 0 - 1 %    IMMATURE GRANULOCYTES 0 0.0 - 0.5 %    ABS. NEUTROPHILS 10.4 (H) 1.8 - 8.0 K/UL    ABS. LYMPHOCYTES 3.3 0.8 - 3.5 K/UL    ABS. MONOCYTES 0.8 0.0 - 1.0 K/UL    ABS. EOSINOPHILS 0.8 (H) 0.0 - 0.4 K/UL    ABS. BASOPHILS 0.2 (H) 0.0 - 0.1 K/UL    ABS. IMM.  GRANS. 0.0 0.00 - 0.04 K/UL    DF MANUAL      RBC COMMENTS ANISOCYTOSIS  1+       METABOLIC PANEL, COMPREHENSIVE    Collection Time: 12/02/22  4:16 AM   Result Value Ref Range    Sodium 148 (H) 136 - 145 mmol/L    Potassium 3.6 3.5 - 5.1 mmol/L    Chloride 110 (H) 97 - 108 mmol/L    CO2 30 21 - 32 mmol/L    Anion gap 8 5 - 15 mmol/L    Glucose 132 (H) 65 - 100 mg/dL    BUN 49 (H) 6 - 20 MG/DL    Creatinine 1.27 0.70 - 1.30 MG/DL    BUN/Creatinine ratio 39 (H) 12 - 20      eGFR >60 >60 ml/min/1.73m2    Calcium 8.7 8.5 - 10.1 MG/DL    Bilirubin, total 0.6 0.2 - 1.0 MG/DL    ALT (SGPT) 388 (H) 12 - 78 U/L    AST (SGOT) 102 (H) 15 - 37 U/L    Alk.  phosphatase 297 (H) 45 - 117 U/L    Protein, total 6.1 (L) 6.4 - 8.2 g/dL    Albumin 2.4 (L) 3.5 - 5.0 g/dL    Globulin 3.7 2.0 - 4.0 g/dL    A-G Ratio 0.6 (L) 1.1 - 2.2     PROCALCITONIN    Collection Time: 12/02/22  4:16 AM   Result Value Ref Range    Procalcitonin 0.55 ng/mL   PROTHROMBIN TIME + INR    Collection Time: 12/02/22  4:16 AM   Result Value Ref Range    INR 1.2 (H) 0.9 - 1.1      Prothrombin time 12.2 (H) 9.0 - 11.1 sec   BILIRUBIN, DIRECT    Collection Time: 12/02/22  4:16 AM   Result Value Ref Range    Bilirubin, direct 0.3 (H) 0.0 - 0.2 MG/DL   MAGNESIUM    Collection Time: 12/02/22  4:16 AM   Result Value Ref Range    Magnesium 2.0 1.6 - 2.4 mg/dL   GLUCOSE, POC    Collection Time: 12/02/22  4:16 AM   Result Value Ref Range    Glucose (POC) 125 (H) 65 - 117 mg/dL    Performed by Adriel Dawkins RN    GLUCOSE, POC    Collection Time: 12/02/22  5:55 AM   Result Value Ref Range    Glucose (POC) 113 65 - 117 mg/dL    Performed by Adriel Dawkins RN    LABRCNT(wbc:2,hgb:2,hct:2,plt:2,)  Recent Labs     12/02/22  0416 12/01/22  1540 12/01/22  0248 11/30/22  1337   * 148* 142 145   K 3.6 3.3* 4.2 3.8   * 109* 106 110*   CO2 30 28 24 29   BUN 49* 42* 48* 43*   CREA 1.27 1.08 1.20 1.06   * 113* 287* 105*   CA 8.7 9.2 8.6 8.8   MG 2.0 1.8 2.1 2.2   PHOS  --  4.4  --  3.3   LABRCNT(sgot:3,gpt:3,ap:3,tbiL:3,TP:3,ALB:3,GLOB:3,ggt:3,aml:3,amyp:3,lpse:3,hlpse:3)  Recent Labs     12/02/22  0416 11/30/22  0434 INR 1.2* 1.2*   PTP 12.2* 12.4*     Recent Labs     12/02/22  0416 12/01/22  1540 12/01/22  0248   * 366* 385*   TP 6.1* 6.6 6.4   ALB 2.4* 2.6* 2.4*   GLOB 3.7 4.0 4.0   BRIEFLAB(B12,FOL,FOLAT,RBCF)No results found for: FOL, RBCFLABRCNT(CPK:3,CpKMB:3,ckndx:3,troiq:3)No components found for: GLPOCBRIEFLAB(CHOL,CHOLX,CHOLP,CHLST,CHOLV,HDL,HDLC,HDLP,LDL,DLDL,LDLC,DLDLP,TGL,TGLX,TRIGL,TRIGP,CHHD,CHHDX)  Recent Labs     12/01/22  1055   PH 7.45   PCO2 34*   PO2 62*   LABRCNT(CPK:3,CpKMB:3,ckndx:3,troiq:3)LOLLY Priest  No results for input(s): CPK, CKNDX, TROIQ in the last 72 hours. No lab exists for component: CPKMBMEShannon LOLLY Aburto      Problem List:     Active Problems:    Acute non-Q wave non-ST elevation myocardial infarction (NSTEMI) ([facility] [address] (11/15/2022)      Aortic stenosis (11/22/2022)      CAD (coronary artery disease) (11/22/2022)      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)      Overview: On pump CORONARY ARTERY BYPASS GRAFT  X 1 WITH LEFT INTERNAL MAMMARY       ARTERY to LAD      AORTIC VALVE REPLACEMENT WITH MEYER 25MM INSPIRIS RESILIA TISSUE VALVE       REPAIR OF ASCENDING AORTIC ANEURYSM with 26mm hemashield graft      S/P AVR (aortic valve replacement) and aortoplasty (11/22/2022)      Overview: On pump CORONARY ARTERY BYPASS GRAFT  X 1 WITH LEFT INTERNAL MAMMARY       ARTERY to LAD      AORTIC VALVE REPLACEMENT WITH MEYER 25MM INSPIRIS RESILIA TISSUE VALVE       REPAIR OF ASCENDING AORTIC ANEURYSM with 26mm hemashield graft    Assessment/Plan:  Mr. Robina Obregon is a 43yo with T2DM on insulin A1C 9-10, hypothyroidism on LT4, s/p on-pump CABG 11/22/2022 with tissue aortic valve replacement and ascending aortic aneurysm repair due to bicuspic AV, COVID positive rapid PCR negative 11/23, worsening hypoxemic respiratory failure s/p reintubation 11/24/2022 now with worsening PEEP and FiO2 requirements, severe SERGE 11/24 Cr of 2 now improved, with severe acute hepatitis that is improving.     Very likely has baseline NAFLD. Serologies for other causes of liver injury are negative. LFTs are much improved and continue to trend down. improving. Bili is normal and INR is near normal.       Leukocytosis is improved with no bands and decreased eos. Still febrile but cx negative. ID following. KUB showed prominent fecal stasis but no SB dilation. He had a good BM yesterday and now has normal bowel sounds. Advance diet as tolerated. Will chart review to follow alpha-gal antibodies to ensure no allergy against pig/lamb/cow though lower suspicion. If positive, this could be catastrophic given recent porcine valve.     Will sign off and see again on request.         [place], [place]    12/2/2022  3500  [address] [place] [address], [address] [place] [address]  [phone]  [facility] [place] [address] [address]: [phone]

## 2022-12-02 NOTE — PROGRESS NOTES
Problem: Self Care Deficits Care Plan (Adult)  Goal: *Acute Goals and Plan of Care (Insert Text)  Description:     FUNCTIONAL STATUS PRIOR TO ADMISSION: Patient was independent and active without use of DME.    HOME SUPPORT: The patient lived with his mother. Occupational Therapy Goals  Re-evaluation 12/2/2022  1. Patient will perform grooming sitting edge of bed or unsupported in chair with mod I after set up within 7 day(s). 2.  Patient will tolerate > or = 5 minutes static standing for functional task without UE support within 7 day(s). 3.  Patient will follow move in the tube precaution for bed mobility, sit to stand, stand to sit with 1-2 verbal cues within 7 day(s). 4.  Patient will transfer to and from toilet and complete all toileting tasks with CGA to minimal assist within 7 day(s). 5.  Patient will perform cardiac exercises with handout and min cues within 7 day(s). Initiated 11/23/2022  1. Patient will perform grooming standing at sink with supervision/set-up within 7 day(s). 2.  Patient will perform upper body dressing with supervision/set-up within 7 day(s). 3.  Patient will perform lower body dressing with supervision/set-up within 7 day(s). 4.  Patient will perform toilet transfers with supervision/set-up within 7 day(s). 5.  Patient will perform all aspects of toileting with supervision/set-up within 7 day(s).       Outcome: Not Met    OCCUPATIONAL THERAPY RE-EVALUATION  Patient: Aydin Welch (89 y.o. male)  Date: 12/2/2022  Diagnosis: Acute non-Q wave non-ST elevation myocardial infarction (NSTEMI) ([facility]) [I21.4]  Aortic stenosis [I35.0]  CAD (coronary artery disease) [I25.10] <principal problem not specified>  Procedure(s) (LRB):  SYLWIA BY DR Noelle De Los Santos -  CORONARY ARTERY BYPASS GRAFT  X 1 WITH LEFT INTERNAL MAMMARY ARTERY GRAFTING - AORTIC VALVE REPLACEMENT WITH MEYER 25MM INSPIRIS RESILIA TISSUE VALVE AND REPAIR OF ASCENDING AORTIC ANEURYSM (N/A) 10 Days Post-Op  Precautions: Sternal (move in the tube)  Chart, occupational therapy assessment, plan of care, and goals were reviewed. ASSESSMENT  Based on the objective data described below, patient seen after being re-intubated on 11/23/22 and extubated on 12/1/22. Received on 10 liters mid flow and maintained sats > or = 94%. Required cues to keep eyes open and reported he was closing due to anxiety re: moving. Required redirection to prevent perseverative moaning with all mobility. Initiated basic UE and LE AROM exercises due to decreased activity tolerance and recommend instruction on cardiac exercises next visit. Current Level of Function Impacting Discharge (ADLs): total assist LE ADL's, toileting, mod assist of 2 bed mobility and stand pivot transfer    Other factors to consider for discharge:          PLAN :  Recommendations and Planned Interventions: self care training, functional mobility training, therapeutic exercise, balance training, therapeutic activities, endurance activities, patient education, home safety training, and family training/education    Frequency/Duration: Patient will be followed by occupational therapy 5 times a week to address goals. Recommend with staff: in chair for meals, use of bedside commode, encourage exercises    Recommend next OT session: per goals    Recommendation for discharge: (in order for the patient to meet his/her long term goals)  Therapy 3 hours per day 5-7 days per week [facility] pending progress    This discharge recommendation:  Has not yet been discussed the attending provider and/or case management    Equipment recommendations for successful discharge (if) home:        SUBJECTIVE:   Patient stated I don't care if I ever see that bed again.     OBJECTIVE DATA SUMMARY:   Hospital course since last seen and reason for reevaluation:   11/23/22 developed hypoxemia. Patient had bronch and SYLWIA neither of which explained cause for hypoxemia.  He required re-intubation and was put on veletri. Cognitive/Behavioral Status:  Neurologic State: Drowsy  Orientation Level: Oriented X4  Cognition: Follows commands; Appropriate for age attention/concentration  Perception: Appears intact  Perseveration: Perseverates during mobility (on moaning)  Safety/Judgement: Awareness of environment; Insight into deficits; Fall prevention    Skin: intact, noted lips pale    Edema: all extremities, primarily distal    Hearing: Auditory  Auditory Impairment: None    Vision/Perceptual:                                     Range of Motion:  AROM: Generally decreased, functional at elbow, wrist and hand                         Strength:    Strength: Generally decreased, functional for elbow, wrist and hand                Coordination:  Coordination: Generally decreased, functional  Fine Motor Skills-Upper: Left Impaired;Right Impaired    Gross Motor Skills-Upper: Left Impaired;Right Impaired    Tone & Sensation:  Tone: Normal  Sensation:  (not assessed)                        Functional Mobility and Transfers for ADLs:  Bed Mobility:  Rolling: Minimum assistance;Bed Modified  Supine to Sit: Assist x2; Moderate assistance; Additional time;Bed Modified    Transfers:  Sit to Stand: Assist x2; Additional time; Moderate assistance (bed elevated)  Functional Transfers  Bathroom Mobility: Dependent/total assistance  Toilet Transfer : Minimum assistance; Moderate assistance;Assist x2; Additional time (stand pivot to commode (infer))  Bed to Chair: Minimum assistance; Additional time;Assist x2 (max cues)    Balance:  Sitting: Impaired  Sitting - Static: Good (unsupported)  Sitting - Dynamic: Fair (occasional)  Standing: Impaired; With support  Standing - Static: Constant support; Fair  Standing - Dynamic : Poor    ADL Assessment:  Feeding:  (NPO)    Oral Facial Hygiene/Grooming: Minimum assistance              Upper Body Dressing: Maximum assistance    Lower Body Dressing: Total assistance    Toileting:  Total assistance                ADL Intervention and task modifications:     Educated on role of OT    Instructed on benefit of increased participation in ADL tasks    Educated on [facility] in the tube\" precautions during bed mobility and sit to stand/stand to sit    Instructed in pursed lip breathing          Cognitive Retraining  Safety/Judgement: Awareness of environment; Insight into deficits; Fall prevention    Therapeutic Exercises:   Instructed in supine in performance of UE and LE AROM focused on gross grasp and release while performing ankle pumps as he has increased bilateral UE edema  Instructed in AROM wrist and elbow flexion/extension and performance seated and in chair    Instructed on bilateral ankle pumps with focus on quality of movement as she demonstrated minimal AROM and more fluttering of toes. Educated on purpose of exercise to increase blood flow in legs, demonstrated improved performance with cues    Instructed in positioning off LE's in bridge and holding for increased strength and placement of pillow between LE's to squeeze in supine (completed ~5 reps of each with max cues)    Functional Measure:    Barthel Index:  Bathin  Bladder: 0  Bowels: 5  Groomin  Dressin  Feedin  Mobility: 0  Stairs: 0  Toilet Use: 0  Transfer (Bed to Chair and Back): 5  Total: 10/100      The Barthel ADL Index: Guidelines  1. The index should be used as a record of what a patient does, not as a record of what a patient could do. 2. The main aim is to establish degree of independence from any help, physical or verbal, however minor and for whatever reason. 3. The need for supervision renders the patient not independent. 4. A patient's performance should be established using the best available evidence. Asking the patient, friends/relatives and nurses are the usual sources, but direct observation and common sense are also important. However direct testing is not needed.   5. Usually the patient's performance over the preceding 24-48 hours is important, but occasionally longer periods will be relevant. 6. Middle categories imply that the patient supplies over 50 per cent of the effort. 7. Use of aids to be independent is allowed. Score Interpretation (from [facility])    Independent   60-79 Minimally independent   40-59 Partially dependent   20-39 Very dependent   <20 Totally dependent     -Wilian Rico., Barthel, D.W. (1965). Functional evaluation: the Barthel Index. [address] ([address] (1997). The Barthel activities of daily living index: self-reporting versus actual performance in the old (> or = 75 years). Journal of [address] [place] [address] 45(7), [facility], GLENNY, Feliz Harrell., Maryanne Pulliam. (1999). Measuring the change in disability after inpatient rehabilitation; comparison of the responsiveness of the Barthel Index and Functional [place] Measure. Journal of Neurology, Neurosurgery, and Psychiatry, 66(4), 406-743. Chandana Demarco, [email or web address].[email or web address], DANIEL Hodge, & Lexi Davila M.A. (2004) Assessment of post-stroke quality of life in cost-effectiveness studies: The usefulness of the Barthel Index and the EuroQoL-5D. Quality of Life Research, 13, 427-43         Pain:  Not rated but complaint of pain during stand pivot transfer, RN present    Activity Tolerance:   Fair and desaturates with exertion and requires oxygen    After treatment patient left in no apparent distress:   Sitting in chair and Call bell within reach    COMMUNICATION/COLLABORATION:   The patients plan of care was discussed with: Physical therapist and Registered nurse.      TETO Damon/L  Time Calculation: 26 mins

## 2022-12-02 NOTE — PROGRESS NOTES
[facility] ICU Progress Note    Admit Date: 11/15/2022  POD:  10 Day Post-Op    Procedure:  Procedure(s):  SYLWIA BY DR Bushra Guerra -  CORONARY ARTERY BYPASS GRAFT  X 1 WITH LEFT INTERNAL MAMMARY ARTERY GRAFTING - AORTIC VALVE REPLACEMENT WITH MEYER 25MM INSPIRIS RESILIA TISSUE VALVE AND REPAIR OF ASCENDING AORTIC ANEURYSM        Subjective/Interval:   Pt seen with Dr. Shellie Anton. Patient was extubated yesterday, currently sating 94% on  on 50% HFNC, CXR improving, continues on a lasix gtt @5. Anxiety issues last night, Precedex started with good effect. Off all pressors, SBP 100s this am.  T max 100.9 continues on Eraxis, Meropenem and Vancomycin, cultures negative to date, procal 0.55 this am. BM x 2 last HS, no nausea or abd pain. NGT clamped overnight, 10cc residual this am. UOP 4075. Net -1649cc last 24. Objective:   Vitals:  Blood pressure (!) 92/55, pulse 68, temperature (!) 100.6 °F (38.1 °C), resp. rate 29, height 5' 9\" (1.753 m), weight 252 lb 13.9 oz (114.7 kg), SpO2 93 %. Temp (24hrs), Av.5 °F (38.1 °C), Min:99.3 °F (37.4 °C), Max:101.7 °F (38.7 °C)      EKG/Rhythm:  NSR 60-80s    NGT outout: 10cc    Ventilator:  Ventilator Volumes  Vt Set (ml): 450 ml (22)  Vt Exhaled (Machine Breath) (ml): 475 ml (22)  Vt Spont (ml): 523 ml (22)  Ve Observed (l/min): 11.6 l/min (22)    Oxygen Therapy:  Oxygen Therapy  O2 Sat (%): 93 % (22 07)  Pulse via Oximetry: 68 beats per minute (22 0700)  O2 Device: Heated; Hi flow nasal cannula (22 1911)  Skin Assessment: Clean, dry, & intact (22 0400)  Skin Protection for O2 Device: No (22 0000)  Orientation: Anterior;Bilateral (22)  Location: Cheek (22)  Interventions: Skin Barrier (22)  O2 Flow Rate (L/min): 35 l/min (22)  O2 Temperature: 87.8 °F (31 °C) (22)  FIO2 (%): 50 % (22)    CXR    CXR Results  (Last 48 hours) 12/02/22 0558  XR CHEST PORT Final result    Impression:  Interval extubation with improved vascular clarity/aeration of the lungs. Narrative:  INDICATION: post op       EXAMINATION:  AP CHEST, PORTABLE       COMPARISON: 12/1/2022       FINDINGS: Single AP portable view of the chest demonstrates interval extubation. Right IJ sheath and nasogastric tube remain in place. The cardiomediastinal   silhouette is unchanged. Improved vascular clarity and definite of inspiration. No pneumothorax. 12/01/22 0551  XR CHEST PORT Final result    Impression:  Interval removal of Erwin-Yuly catheter. No other changes. Narrative:  EXAM:  XR CHEST PORT       INDICATION: Postop heart       COMPARISON: November 30       TECHNIQUE: portable chest AP view       FINDINGS interval removal of Erwin-Yuly catheter. Other support devices are   stable. The inspiration is shallow with minimal vascular prominence. Admission Weight: Last Weight   Weight: 257 lb 15 oz (117 kg) Weight: 252 lb 13.9 oz (114.7 kg)     Intake / Output / Drain:  Current Shift: No intake/output data recorded. Last 24 hrs.:   Intake/Output Summary (Last 24 hours) at 12/2/2022 0745  Last data filed at 12/2/2022 0700  Gross per 24 hour   Intake 2865.27 ml   Output 4075 ml   Net -1209.73 ml       EXAM:  General:  Alert, appropriate, NAD. Lungs:    Diminished to auscultation bilaterally   Incision:  Incision clean, dry and intact and prineo intact   Heart:   Regular rate and rhythm  and 1/6 systolic murmur, rubs or gallops   Abdomen:    Distended, soft, hypoactive bowel sounds, and obese.    Extremities:  Trace edema BLE, BUE   Neurologic:  Non-focal.     Labs:   Recent Labs     12/02/22  0555 12/02/22  0416   WBC  --  15.5*   HGB  --  8.8*   HCT  --  28.9*   PLT  --  262   NA  --  148*   K  --  3.6   BUN  --  49*   CREA  --  1.27   GLU  --  132*   GLUCPOC 113 125*   INR  --  1.2*          Assessment:     Active Problems:    Acute non-Q wave non-ST elevation myocardial infarction (NSTEMI) ([facility] [address] (11/15/2022)      Aortic stenosis (11/22/2022)      CAD (coronary artery disease) (11/22/2022)      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)      Overview: On pump CORONARY ARTERY BYPASS GRAFT  X 1 WITH LEFT INTERNAL MAMMARY       ARTERY to LAD      AORTIC VALVE REPLACEMENT WITH MEYER 25MM INSPIRIS RESILIA TISSUE VALVE       REPAIR OF ASCENDING AORTIC ANEURYSM with 26mm hemashield graft      S/P AVR (aortic valve replacement) and aortoplasty (11/22/2022)      Overview: On pump CORONARY ARTERY BYPASS GRAFT  X 1 WITH LEFT INTERNAL MAMMARY       ARTERY to LAD      AORTIC VALVE REPLACEMENT WITH MEYER 25MM INSPIRIS RESILIA TISSUE VALVE       REPAIR OF ASCENDING AORTIC ANEURYSM with 26mm hemashield graft       Plan/Recommendations/Medical Decision Making:     Severe symptomatic AS, s/p tissue AVR. Continue ASA. CAD, STEMI, s/p CABG. Continue ASA. Holding amio, statin for transaminitis. Repeat Echo without effusion or tamponade, NL LV/RVF. Acute hypoxic respiratory failure: Now extubate, wean of HF as able. Continue diuresis, encourage IS. Nebs BID and PRN. Flutter valve ordered. SERGE: Crt up to 2.01. Nephrology consult appreciated, avoid nephrotoxins, trend. Crt 1.27 this am. Transition to Lasix 40 mg IV TID. Acute blood loss anemia: H&H 8.8/28.9 this am  Transaminitis. Acute Severe Hepatitis, GI consult appreciated, patient now with hepatomegaly and splenomegaly. Viral panel sent. Triple phase liver scan completed 11/30. No hepatic of splenic ischemia noted. + hepatic steatosis. Labs cont to trend down. Leukocytosis: BC, Sputum and urine cultures NGTD. Seen by ID, Abx changed to Meropenem and Vanco. Eraxis started by ICU, fungal cx NGTD. WBC up to 22.6, 15.5 today, Tm 100.9. Procal cont to trend down, 0.55. HTN. On ACEi, BB PTA; resume BB when appropriate. HLD. holding statin. WALLY , not on CPAP.  Was unable to tolerate due to anxiety; he has been working on this with Sleep Medicine. OP follow-up. DMII. On Toujeo at home. Repeat A1C 9.0. Diabetes management following. Cont lantus, if this does not bring BG < 200 pt will need to go back on Insulin gtt. Hypothyroidism. On Synthroid PTA; continue. Repeat TSH 1.96. GERD. Continue PPI. Anxiety and depression. On Buspar, Zoloft; continue. Supportive care. Ileus: BM x 2 yesterday, NGT clamped last HS, 10cc residual this am, Appreciate GI's input, Speech eval ordered, will remove NGT and advance diet if he passes his swallow eval.      DVT ppx- Heparin subcu  Dispo- remain in ICU.        Signed By: Dequan Craft PA-C

## 2022-12-02 NOTE — PROGRESS NOTES
Problem: Mobility Impaired (Adult and Pediatric)  Goal: *Acute Goals and Plan of Care (Insert Text)  Description: FUNCTIONAL STATUS PRIOR TO ADMISSION: Patient was independent and active without use of DME. Still driving. Denies home O2 use. States he is active at baseline, performing yard work and working part-time as a . HOME SUPPORT PRIOR TO ADMISSION: The patient lived with mother. Physical Therapy Goals  Revised 12/2/2022  1. Patient will move from supine to sit and sit to supine  in bed with supervision/set-up within 7 days. 2.  Patient will perform sit to/from stand with CGA within 7 days. 3.  Patient will ambulate 200 feet with least restrictive assistive device and contact guard assist within 7 days. 4.  Patient will ascend/descend 3 stairs with 1 handrail(s) with contact guard assist within 7 days. 5.  Patient will perform cardiac exercises per protocol with independence within 7 days. 6.  Patient will verbally recall and functionally demonstrate mindful-based movements (\"move in the tube\") principles without cues within 7 days. Physical Therapy Goals  Initiated 11/23/2022  1. Patient will move from supine to sit and sit to supine , scoot up and down, and roll side to side in bed with modified independence within 5 days. 2.  Patient will perform sit to/from stand with modified independence within 5 days. 3.  Patient will ambulate 300 feet with least restrictive assistive device and independence within 5 days. 4.  Patient will ascend/descend 3 stairs with 1 handrail(s) with modified independence within 5 days. 5.  Patient will perform cardiac exercises per protocol with independence within 5 days. 6.  Patient will verbally and functionally demonstrate 3/3 sternal precautions without cues within 5 days.          Outcome: Progressing Towards Goal   PHYSICAL THERAPY REEVALUATION  Patient: Ramsey Cole (62 y.o. male)  Date: 12/2/2022  Primary Diagnosis: Acute non-Q wave non-ST elevation myocardial infarction (NSTEMI) ([facility]) [I21.4]  Aortic stenosis [I35.0]  CAD (coronary artery disease) [I25.10]  Procedure(s) (LRB):  SYLWIA BY DR Catrina Flowers -  CORONARY ARTERY BYPASS GRAFT  X 1 WITH LEFT INTERNAL MAMMARY ARTERY GRAFTING - AORTIC VALVE REPLACEMENT WITH MEYER 25MM INSPIRIS RESILIA TISSUE VALVE AND REPAIR OF ASCENDING AORTIC ANEURYSM (N/A) 10 Days Post-Op   Precautions:   Sternal (move in the tube)      ASSESSMENT  Based on the objective data described below, the patient presents with decreased functional mobility from baseline level of function. Patient seen for re-evaluation following prolonged hospitalization and intubation after CABG and AVR. Currently limited by generalized weakness, impaired balance, gait instability, and decreased activity tolerance. Patient very anxious and  needs extra time as well as reassurance with all mobility. Supine to sit with HOB elevated with modA x 2 and modA to scoot to put feet on the floor. Sit to stand with modA x 2 and cues for technique. Able to pivot bed to chair with Marilyn x 2 and HHA. Needs cues to remain upright as he has a tendency to lean posteriorly. Vitals stable in chair and remains on 10 L O2. Patient currently well below his functional baseline and recommend IPR to progress him to more independent level of function. Other factors to consider for discharge: at risk for falls, below baseline     Patient will benefit from skilled therapy intervention to address the above noted impairments. PLAN :  Recommendations and Planned Interventions: bed mobility training, transfer training, gait training, therapeutic exercises, neuromuscular re-education, patient and family training/education, and therapeutic activities      Frequency/Duration: Patient will be followed by physical therapy:  daily to address goals.     Recommendation for discharge: (in order for the patient to meet his/her long term goals)  Therapy 3 hours per day 5-7 days per week        Equipment recommendations for successful discharge (if) home: TBD         SUBJECTIVE:   Patient stated I am very anxious.     OBJECTIVE DATA SUMMARY:   HISTORY:    Past Medical History:   Diagnosis Date    Anxiety and depression     anxiety, depression    Bleeding of eye, left     8/17/21 pt reports receiving injections in right eye for beeding    Chronic headaches 2007    COVID-19 12/20/2020    Diabetes type 1, uncontrolled     since 8years old    GERD (gastroesophageal reflux disease)     Hypercholesterolemia     Hypertension     Hypothyroidism     MI (myocardial infarction) ([facility] [address]     as of 8/17/21 pt reports 9 stents total    WALLY on CPAP      Past Surgical History:   Procedure Laterality Date    HX CARPAL TUNNEL RELEASE Left 2012    HX CARPAL TUNNEL RELEASE Right 2018    CTE trigger thumb and index finger    HX HEART CATHETERIZATION      HX HEENT      HX LAP CHOLECYSTECTOMY  8/26/14    Dr Staples Roll course since last seen and reason for reevaluation: seen after prolonged intubation follow CABG, AVR and COVID dx    Personal factors and/or comorbidities impacting plan of care:     Home Situation  Home Environment: Private residence  # Steps to Enter: 3  Rails to Enter: Yes  One/Two Story Residence: One story  Living Alone: No  Support Systems: Parent(s)  Patient Expects to be Discharged to[de-identified] Home  Current DME Used/Available at Home: None  Tub or Shower Type: Tub/Shower combination    EXAMINATION/PRESENTATION/DECISION MAKING:   Critical Behavior:  Neurologic State: Alert  Orientation Level: Oriented X4  Cognition: Appropriate decision making, Appropriate for age attention/concentration, Appropriate safety awareness, Follows commands  Safety/Judgement: Awareness of environment, Insight into deficits, Fall prevention  Hearing:   Auditory  Auditory Impairment: None    Range Of Motion:  AROM: Generally decreased, functional         Reviewed move in the tube and will need reinforcement              Strength:    Strength: Generally decreased, functional                    Tone & Sensation:   Tone: Normal              Sensation:  (not assessed)               Coordination:  Coordination: Generally decreased, functional  Vision:      Functional Mobility:  Bed Mobility:  Rolling: Minimum assistance;Bed Modified  Supine to Sit: Assist x2; Moderate assistance; Additional time;Bed Modified        Transfers:  Sit to Stand: Assist x2; Additional time; Moderate assistance (bed elevated)  Stand to Sit: Minimum assistance;Assist x2; Additional time        Bed to Chair: Minimum assistance; Additional time;Assist x2 (max cues)              Balance:   Sitting: Impaired  Sitting - Static: Good (unsupported)  Sitting - Dynamic: Fair (occasional)  Standing: Impaired; With support  Standing - Static: Constant support; Fair  Standing - Dynamic : Poor  Ambulation/Gait Training:  Distance (ft): 4 Feet (ft)  Assistive Device: Gait belt; Other (comment) (B HHA x 2)  Ambulation - Level of Assistance: Minimal assistance;Assist x2        Gait Abnormalities: Decreased step clearance;Shuffling gait        Base of Support: Widened     Speed/Juliet: Shuffled; Slow  Step Length: Right shortened;Left shortened       Pain Rating:  No c/o pain    Activity Tolerance:   Fair, requires rest breaks, and observed SOB with activity    After treatment patient left in no apparent distress:   Sitting in chair and Call bell within reach    COMMUNICATION/EDUCATION:   The patients plan of care was discussed with: Physical therapist, Occupational therapist, and Registered nurse. Fall prevention education was provided and the patient/caregiver indicated understanding., Patient/family have participated as able in goal setting and plan of care. , and Patient/family agree to work toward stated goals and plan of care.     Thank you for this referral.  Dalia Amado, PT, DPT   Time Calculation: 24 mins

## 2022-12-02 NOTE — PROGRESS NOTES
Nutrition Note    Chart reviewed, case discussed during CCU rounds. Pt for SLP consult today. NGT remains in place with minimal OP. BM's noted yesterday. Per IDR discussion with Dr. Waylon Wilson okay to start trophic TF and advance slowly as tolerated given refeeding risk (pt NPO/minimal nutrition for 8+ days). He is also hypernatremic so 150mL q 4h flushes started per Dr. Waylon Wilson. As diet advances and pt demonstrates a good appetite can wean off of TF.      Electronically signed by Karine Moss RD, [address]  on 12/2/2022 at 12:31 PM    Contact: ext [phone]

## 2022-12-02 NOTE — DIABETES MGMT
[address] [facility]    CLINICAL NURSE SPECIALIST CONSULT     Initial Presentation   Gabbie Moreno is a 36 y.o. male admitted 11/15/22 after experiencing chest pain. Afebrile. Hypertensive. 02 sats 100%  LAB: WBC 8.7. Normal H&H. /AG 3. Normal kidney & liver parameters. NT pro-; troponin 714  CXR:  Mild interstitial pulmonary edema versus interstitial pneumonia. No   pneumothorax. Consider PA and lateral chest views when the patient can better   tolerate. HX:   Past Medical History:   Diagnosis Date    Anxiety and depression     anxiety, depression    Bleeding of eye, left     8/17/21 pt reports receiving injections in right eye for beeding    Chronic headaches 2007    COVID-19 12/20/2020    Diabetes type 1, uncontrolled     since 8years old    GERD (gastroesophageal reflux disease)     Hypercholesterolemia     Hypertension     Hypothyroidism     MI (myocardial infarction) ([facility] [address]     as of 8/17/21 pt reports 9 stents total    WALLY on CPAP       INITIAL DX:   Acute non-Q wave non-ST elevation myocardial infarction (NSTEMI) ([address] [I21.4]  Aortic stenosis [I35.0]  CAD (coronary artery disease) [I25.10]     Current Treatment     TX: 11/22/22 On pump CORONARY ARTERY BYPASS GRAFT  X 1 WITH LEFT INTERNAL MAMMARY ARTERY to LAD  AORTIC VALVE REPLACEMENT WITH MEYER 25MM INSPIRIS RESILIA TISSUE VALVE   REPAIR OF ASCENDING AORTIC ANEURYSM with 26mm hemashield graft    Consulted by Provider for advanced diabetes nursing assessment and care for:   [x] Transitioning off Priscila Contras   [x] Inpatient management strategy  [] Home management assessment  [] Survival skill education    Hospital Course   Clinical progress has been complicated by need for ICU level of care. 11/24/22 Overnight developed hypoxemia. Underwent bronch and SYLWIA, neither of which explained cause for hypoxemia. He required re-intubation and was put on veletri. 11/25/22 Intermittent desaturation events. CXR this a.m. with pulmonary edema  11/27/22 Concern for hepatic ischemia/infarction with progressive trnasaminitis. GI consulted determines severe hepatitis  11/29/22 Sedated on Precedex & Propofol. On C vent support Fi02 50%/Peep 8. Trying to transition off epi to baldev infusions. Will restart Tfs. NG 2 suction at the moment. Plan on diuresing  11/30/22 Follows commands with cough & gag+. On AC vent support Fi02 50%/Peep 8. On Fentanyl infusion; Propofol being weaned. BP managed with epi & baldev infusions. NG to suction & drawing 500cc over past day. KKUB revealed prominent fecal status. Receiving Miralax & Senna; no BM for 14 days. 12/1/22 Anxious. BP & HR up. Plan to extubate this morning. On Spon vent support Fi02 40%/Peep 5. Lots of oral secretions. Remains on baldev & epi infusions fo BP management. NG clamped. Will receive enema today for fecal stasis. 12/2/22 Alert & oriented. Precedex no longer needed for anxiety; will be stopped. O2 via MF. NG remains in place. Plans for Speech evaluation to move to oral feeding. In the meantime, a Dobhoff may be indicated with initial feedings at trickle rate. PT will be started as well.     Diabetes History   Type 1 diabetes since age 8; hospitalized at that time and discharged on insulin therapy  Family history positive for both Type 1 & 2 diabetes  Has been on a variety of insulin regimens over the years  Eris Ambriz MD (endocrinologist) for diabetes care    Diabetes-related Medical History  Acute complications  DKA  Neurological complications  Peripheral neuropathy  Microvascular disease  Retinopathy; loss of vision in left eye  Macrovascular disease  CAD, MI  Other  Sleep apnea    Diabetes Medication History  Key Antihyperglycemic Medications               metFORMIN (GLUCOPHAGE) 500 mg tablet (Taking) TAKE 1 TABLET BY MOUTH TWICE DAILY WITH MEALS    insulin glargine U-300 conc (Toujeo Max U-300 SoloStar) 300 unit/mL (3 mL) inpn (Taking) Take 140 units every day (new dosage) insulin regular (NOVOLIN R, HUMULIN R) 100 unit/mL injection (Taking) 40 units with each meal, plus correction. Max daily dose of 150 units. Diabetes self-management practices:   Eating pattern - Eats 3 meals on most days and snacks in the evening   [x] Not eating a carbohydrate-controlled mealplan  Physical activity pattern   [x] Not employing a physical activity program to control BG  Monitoring pattern   [x] Testing BGs   [x] Breakfast 300s  [x] Lunch  100-200s  [x] Dinner  100-200s  Taking medications pattern  [x] Consistent administration  [x] Affordable  Overall evaluation:    [x] Not achieving A1c target with drug therapy & self-care practices    Subjective   \"I'm ok. \"     Objective   Physical exam  General Obese male who is in no acute distress;; less anxiety   Neuro  Alert & oriented  Vital Signs Visit Vitals  BP (!) 104/54   Pulse 73   Temp 100.2 °F (37.9 °C)   Resp 13   Ht 5' 9\" (1.753 m)   Wt 114.7 kg (252 lb 13.9 oz)   SpO2 96%   BMI 37.34 kg/m²     Laboratory  Recent Labs     12/02/22  0416 12/01/22  1540 12/01/22  0248 11/30/22  1337 11/30/22  0433   * 113* 287*   < > 102*   AGAP 8 11 12   < > 9   WBC 15.5*  --  22.6*  --  20.6*   CREA 1.27 1.08 1.20   < > 1.02   * 167* 252*  --  376*   * 518* 591*  --  659*    < > = values in this interval not displayed. Factors impacting BG management  Factor Dose Comments   Nutrition:  TF     NG clamped. Marked fecal statis   Drugs:  Vasopressor load  Atypical antipsychotics   Epi & baldev infusions  Buspar 3XB. Zoloft D   Affects insulin delivery     Pain Dilaudid infusion    Infection Merrem Q8 hrs  Eraxis Q24 hrs  Vanc Q12 hrs Fever.  WBC elevated   Other:   Kidney function  Liver function   SERGE resolved  AST/ALT elevated but trending down      Blood glucose pattern      Significant diabetes-related events over the past 24-72 hours  11/15/22 Admission   Total insulin requirements without achieving target Bgs until 11/20/22:    11/21/22 Started on Kathreen Shoe  11/23/22 Using 5-10 units/hr of insulin this morning  11/28/22 Using 2-6 units/hr of insulin  11/29/22 Continues to use 3-6 units/hr of insulin  11/30/22 Using 3.5 units/hr => transitioned off GS  12/1/22 Bgs rising into 200-300s  12/2/22 Change in basal insulin dosing has brought Bgs to goal    Assessment and Plan   Nursing Diagnosis Risk for unstable blood glucose pattern   Nursing Intervention Domain 5250 Decision-making Support   Nursing Interventions Examined current inpatient diabetes/blood glucose control   Explored factors facilitating and impeding inpatient management  Explored corrective strategies with patient and responsible inpatient provider   Informed patient of rational for insulin strategy while hospitalized     Evaluation   This 36year old  male with known CAD was admitted with chest pain; he underwent CABG & AVR. Patient has known Type 1 diabetes with poor control going into this surgery. Patient required 260 units/D prior to the surgery (during this admission) without consistently achieving BG targets. Had been on Kathreen Shoe since surgery 11/22/22; also required vent & pressor support. Abdominal distention related to feces with bowel regimen in place. Finally extubated to MidFlow 02. With plans to transition to oral feeding noted. Basal insulin dosing appropriate. When he is actually eating >50%, a mealtime dose of insulin will be needed. Recommendations     [x] Continue Lantus insulin to 70 units twice daily    [x] When he is eating >50% of his meals, give 20 units of Humalog insulin at meals    Billing Code(s)   [x] 38045 IP subsequent hospital care - 35 minutes     Before making these care recommendations, I personally reviewed the hospitalization record, including notes, laboratory & diagnostic data and current medications, and examined the patient at the bedside (circumstances permitting) before making care recommendations. More than fifty (50) percent of the time was spent in patient counseling and/or care coordination.   Total minutes: Neli Schultz, CNS  Diabetes Clinical Nurse Specialist  [facility]  Access via [address] [place] [address]

## 2022-12-03 ENCOUNTER — APPOINTMENT (OUTPATIENT)
Dept: GENERAL RADIOLOGY | Age: 42
End: 2022-12-03
Attending: PHYSICIAN ASSISTANT
Payer: COMMERCIAL

## 2022-12-03 LAB
ALBUMIN SERPL-MCNC: 2.6 G/DL (ref 3.5–5)
ALBUMIN/GLOB SERPL: 0.7 {RATIO} (ref 1.1–2.2)
ALP SERPL-CCNC: 270 U/L (ref 45–117)
ALT SERPL-CCNC: 298 U/L (ref 12–78)
ANION GAP SERPL CALC-SCNC: 8 MMOL/L (ref 5–15)
AST SERPL-CCNC: 82 U/L (ref 15–37)
B PERT DNA SPEC QL NAA+PROBE: NOT DETECTED
BACTERIA SPEC CULT: NORMAL
BASOPHILS # BLD: 0.2 K/UL (ref 0–0.1)
BASOPHILS NFR BLD: 1 % (ref 0–1)
BILIRUB DIRECT SERPL-MCNC: 0.3 MG/DL (ref 0–0.2)
BILIRUB SERPL-MCNC: 0.7 MG/DL (ref 0.2–1)
BORDETELLA PARAPERTUSSIS PCR, BORPAR: NOT DETECTED
BUN SERPL-MCNC: 45 MG/DL (ref 6–20)
BUN/CREAT SERPL: 37 (ref 12–20)
C PNEUM DNA SPEC QL NAA+PROBE: NOT DETECTED
CALCIUM SERPL-MCNC: 8.9 MG/DL (ref 8.5–10.1)
CHLORIDE SERPL-SCNC: 110 MMOL/L (ref 97–108)
CO2 SERPL-SCNC: 30 MMOL/L (ref 21–32)
CREAT SERPL-MCNC: 1.22 MG/DL (ref 0.7–1.3)
DIFFERENTIAL METHOD BLD: ABNORMAL
EOSINOPHIL # BLD: 0.7 K/UL (ref 0–0.4)
EOSINOPHIL NFR BLD: 4 % (ref 0–7)
ERYTHROCYTE [DISTWIDTH] IN BLOOD BY AUTOMATED COUNT: 16.3 % (ref 11.5–14.5)
ERYTHROCYTE [SEDIMENTATION RATE] IN BLOOD: 66 MM/HR (ref 0–15)
FLUAV SUBTYP SPEC NAA+PROBE: NOT DETECTED
FLUBV RNA SPEC QL NAA+PROBE: NOT DETECTED
GLOBULIN SER CALC-MCNC: 3.6 G/DL (ref 2–4)
GLUCOSE BLD STRIP.AUTO-MCNC: 160 MG/DL (ref 65–117)
GLUCOSE BLD STRIP.AUTO-MCNC: 188 MG/DL (ref 65–117)
GLUCOSE BLD STRIP.AUTO-MCNC: 216 MG/DL (ref 65–117)
GLUCOSE BLD STRIP.AUTO-MCNC: 84 MG/DL (ref 65–117)
GLUCOSE SERPL-MCNC: 187 MG/DL (ref 65–100)
HADV DNA SPEC QL NAA+PROBE: NOT DETECTED
HCOV 229E RNA SPEC QL NAA+PROBE: NOT DETECTED
HCOV HKU1 RNA SPEC QL NAA+PROBE: NOT DETECTED
HCOV NL63 RNA SPEC QL NAA+PROBE: NOT DETECTED
HCOV OC43 RNA SPEC QL NAA+PROBE: NOT DETECTED
HCT VFR BLD AUTO: 30.9 % (ref 36.6–50.3)
HGB BLD-MCNC: 9.1 G/DL (ref 12.1–17)
HMPV RNA SPEC QL NAA+PROBE: NOT DETECTED
HPIV1 RNA SPEC QL NAA+PROBE: NOT DETECTED
HPIV2 RNA SPEC QL NAA+PROBE: NOT DETECTED
HPIV3 RNA SPEC QL NAA+PROBE: NOT DETECTED
HPIV4 RNA SPEC QL NAA+PROBE: NOT DETECTED
IMM GRANULOCYTES # BLD AUTO: 0 K/UL (ref 0–0.04)
IMM GRANULOCYTES NFR BLD AUTO: 0 % (ref 0–0.5)
LYMPHOCYTES # BLD: 2.4 K/UL (ref 0.8–3.5)
LYMPHOCYTES NFR BLD: 15 % (ref 12–49)
M PNEUMO DNA SPEC QL NAA+PROBE: NOT DETECTED
MAGNESIUM SERPL-MCNC: 2.3 MG/DL (ref 1.6–2.4)
MCH RBC QN AUTO: 26.4 PG (ref 26–34)
MCHC RBC AUTO-ENTMCNC: 29.4 G/DL (ref 30–36.5)
MCV RBC AUTO: 89.6 FL (ref 80–99)
MONOCYTES # BLD: 1.6 K/UL (ref 0–1)
MONOCYTES NFR BLD: 10 % (ref 5–13)
NEUTS BAND NFR BLD MANUAL: 3 %
NEUTS SEG # BLD: 11.4 K/UL (ref 1.8–8)
NEUTS SEG NFR BLD: 67 % (ref 32–75)
NRBC # BLD: 0.03 K/UL (ref 0–0.01)
NRBC BLD-RTO: 0.2 PER 100 WBC
PLATELET # BLD AUTO: 305 K/UL (ref 150–400)
PMV BLD AUTO: 9.2 FL (ref 8.9–12.9)
POTASSIUM SERPL-SCNC: 3.4 MMOL/L (ref 3.5–5.1)
PROCALCITONIN SERPL-MCNC: 0.25 NG/ML
PROT SERPL-MCNC: 6.2 G/DL (ref 6.4–8.2)
RBC # BLD AUTO: 3.45 M/UL (ref 4.1–5.7)
RBC MORPH BLD: ABNORMAL
RSV RNA SPEC QL NAA+PROBE: NOT DETECTED
RV+EV RNA SPEC QL NAA+PROBE: NOT DETECTED
SARS-COV-2 PCR, COVPCR: NOT DETECTED
SERVICE CMNT-IMP: ABNORMAL
SERVICE CMNT-IMP: NORMAL
SERVICE CMNT-IMP: NORMAL
SODIUM SERPL-SCNC: 148 MMOL/L (ref 136–145)
WBC # BLD AUTO: 16.3 K/UL (ref 4.1–11.1)

## 2022-12-03 PROCEDURE — 82248 BILIRUBIN DIRECT: CPT

## 2022-12-03 PROCEDURE — 80053 COMPREHEN METABOLIC PANEL: CPT

## 2022-12-03 PROCEDURE — 71045 X-RAY EXAM CHEST 1 VIEW: CPT

## 2022-12-03 PROCEDURE — 83735 ASSAY OF MAGNESIUM: CPT

## 2022-12-03 PROCEDURE — 85025 COMPLETE CBC W/AUTO DIFF WBC: CPT

## 2022-12-03 PROCEDURE — 94660 CPAP INITIATION&MGMT: CPT

## 2022-12-03 PROCEDURE — 74011250637 HC RX REV CODE- 250/637: Performed by: NURSE PRACTITIONER

## 2022-12-03 PROCEDURE — 97110 THERAPEUTIC EXERCISES: CPT | Performed by: PHYSICAL THERAPIST

## 2022-12-03 PROCEDURE — 86038 ANTINUCLEAR ANTIBODIES: CPT

## 2022-12-03 PROCEDURE — 85652 RBC SED RATE AUTOMATED: CPT

## 2022-12-03 PROCEDURE — 65610000006 HC RM INTENSIVE CARE

## 2022-12-03 PROCEDURE — 92526 ORAL FUNCTION THERAPY: CPT

## 2022-12-03 PROCEDURE — 84145 PROCALCITONIN (PCT): CPT

## 2022-12-03 PROCEDURE — 74011250637 HC RX REV CODE- 250/637: Performed by: PHYSICIAN ASSISTANT

## 2022-12-03 PROCEDURE — 74011000250 HC RX REV CODE- 250: Performed by: INTERNAL MEDICINE

## 2022-12-03 PROCEDURE — 74011250636 HC RX REV CODE- 250/636: Performed by: INTERNAL MEDICINE

## 2022-12-03 PROCEDURE — 94640 AIRWAY INHALATION TREATMENT: CPT

## 2022-12-03 PROCEDURE — 74011000250 HC RX REV CODE- 250: Performed by: ANESTHESIOLOGY

## 2022-12-03 PROCEDURE — 82962 GLUCOSE BLOOD TEST: CPT

## 2022-12-03 PROCEDURE — 86037 ANCA TITER EACH ANTIBODY: CPT

## 2022-12-03 PROCEDURE — 77010033711 HC HIGH FLOW OXYGEN

## 2022-12-03 PROCEDURE — 74011250637 HC RX REV CODE- 250/637: Performed by: INTERNAL MEDICINE

## 2022-12-03 PROCEDURE — 97116 GAIT TRAINING THERAPY: CPT | Performed by: PHYSICAL THERAPIST

## 2022-12-03 PROCEDURE — 74011000250 HC RX REV CODE- 250: Performed by: NURSE PRACTITIONER

## 2022-12-03 PROCEDURE — 36415 COLL VENOUS BLD VENIPUNCTURE: CPT

## 2022-12-03 PROCEDURE — 74011000258 HC RX REV CODE- 258: Performed by: INTERNAL MEDICINE

## 2022-12-03 PROCEDURE — 74011000250 HC RX REV CODE- 250: Performed by: PHYSICIAN ASSISTANT

## 2022-12-03 PROCEDURE — 0202U NFCT DS 22 TRGT SARS-COV-2: CPT

## 2022-12-03 PROCEDURE — 74011250636 HC RX REV CODE- 250/636: Performed by: THORACIC SURGERY (CARDIOTHORACIC VASCULAR SURGERY)

## 2022-12-03 PROCEDURE — C9113 INJ PANTOPRAZOLE SODIUM, VIA: HCPCS | Performed by: INTERNAL MEDICINE

## 2022-12-03 PROCEDURE — 74011636637 HC RX REV CODE- 636/637: Performed by: HOSPITALIST

## 2022-12-03 PROCEDURE — 86431 RHEUMATOID FACTOR QUANT: CPT

## 2022-12-03 RX ORDER — POTASSIUM CHLORIDE 29.8 MG/ML
20 INJECTION INTRAVENOUS
Status: COMPLETED | OUTPATIENT
Start: 2022-12-03 | End: 2022-12-03

## 2022-12-03 RX ORDER — HYDROCODONE POLISTIREX AND CHLORPHENIRAMINE POLISTIREX 10; 8 MG/5ML; MG/5ML
10 SUSPENSION, EXTENDED RELEASE ORAL
Status: DISCONTINUED | OUTPATIENT
Start: 2022-12-03 | End: 2022-12-03

## 2022-12-03 RX ORDER — MORPHINE SULFATE ORAL SOLUTION 10 MG/5ML
10 SOLUTION ORAL
Status: DISCONTINUED | OUTPATIENT
Start: 2022-12-03 | End: 2022-12-05

## 2022-12-03 RX ORDER — OXYCODONE HYDROCHLORIDE 5 MG/1
10 TABLET ORAL
Status: DISCONTINUED | OUTPATIENT
Start: 2022-12-03 | End: 2022-12-06

## 2022-12-03 RX ADMIN — LORAZEPAM 1 MG: 1 TABLET ORAL at 09:08

## 2022-12-03 RX ADMIN — Medication 3 UNITS: at 23:07

## 2022-12-03 RX ADMIN — HEPARIN SODIUM 5000 UNITS: 5000 INJECTION INTRAVENOUS; SUBCUTANEOUS at 15:39

## 2022-12-03 RX ADMIN — METOCLOPRAMIDE 5 MG: 5 INJECTION, SOLUTION INTRAMUSCULAR; INTRAVENOUS at 11:15

## 2022-12-03 RX ADMIN — HEPARIN SODIUM 5000 UNITS: 5000 INJECTION INTRAVENOUS; SUBCUTANEOUS at 23:07

## 2022-12-03 RX ADMIN — METOCLOPRAMIDE 5 MG: 5 INJECTION, SOLUTION INTRAMUSCULAR; INTRAVENOUS at 06:08

## 2022-12-03 RX ADMIN — ASPIRIN 81 MG CHEWABLE TABLET 81 MG: 81 TABLET CHEWABLE at 08:56

## 2022-12-03 RX ADMIN — POTASSIUM CHLORIDE 20 MEQ: 29.8 INJECTION, SOLUTION INTRAVENOUS at 10:23

## 2022-12-03 RX ADMIN — BISACODYL 10 MG: 10 SUPPOSITORY RECTAL at 08:54

## 2022-12-03 RX ADMIN — IPRATROPIUM BROMIDE AND ALBUTEROL SULFATE 3 ML: 2.5; .5 SOLUTION RESPIRATORY (INHALATION) at 19:17

## 2022-12-03 RX ADMIN — INSULIN GLARGINE 70 UNITS: 100 INJECTION, SOLUTION SUBCUTANEOUS at 23:07

## 2022-12-03 RX ADMIN — LEVOTHYROXINE SODIUM 150 MCG: 0.07 TABLET ORAL at 06:11

## 2022-12-03 RX ADMIN — SODIUM CHLORIDE, PRESERVATIVE FREE 10 ML: 5 INJECTION INTRAVENOUS at 21:11

## 2022-12-03 RX ADMIN — MEROPENEM 1 G: 1 INJECTION, POWDER, FOR SOLUTION INTRAVENOUS at 18:23

## 2022-12-03 RX ADMIN — SODIUM CHLORIDE, PRESERVATIVE FREE 10 ML: 5 INJECTION INTRAVENOUS at 06:14

## 2022-12-03 RX ADMIN — MEROPENEM 1 G: 1 INJECTION, POWDER, FOR SOLUTION INTRAVENOUS at 11:03

## 2022-12-03 RX ADMIN — POTASSIUM CHLORIDE 20 MEQ: 29.8 INJECTION, SOLUTION INTRAVENOUS at 16:30

## 2022-12-03 RX ADMIN — FUROSEMIDE 40 MG: 10 INJECTION, SOLUTION INTRAMUSCULAR; INTRAVENOUS at 21:10

## 2022-12-03 RX ADMIN — SERTRALINE 100 MG: 50 TABLET, FILM COATED ORAL at 08:55

## 2022-12-03 RX ADMIN — Medication 3 UNITS: at 06:10

## 2022-12-03 RX ADMIN — POTASSIUM CHLORIDE 20 MEQ: 29.8 INJECTION, SOLUTION INTRAVENOUS at 14:50

## 2022-12-03 RX ADMIN — OXYCODONE 10 MG: 5 TABLET ORAL at 21:17

## 2022-12-03 RX ADMIN — SENNOSIDES AND DOCUSATE SODIUM 2 TABLET: 50; 8.6 TABLET ORAL at 08:55

## 2022-12-03 RX ADMIN — POTASSIUM CHLORIDE 20 MEQ: 29.8 INJECTION INTRAVENOUS at 05:46

## 2022-12-03 RX ADMIN — POTASSIUM CHLORIDE 20 MEQ: 29.8 INJECTION INTRAVENOUS at 06:49

## 2022-12-03 RX ADMIN — METOCLOPRAMIDE 5 MG: 5 INJECTION, SOLUTION INTRAMUSCULAR; INTRAVENOUS at 18:20

## 2022-12-03 RX ADMIN — BUSPIRONE HYDROCHLORIDE 15 MG: 5 TABLET ORAL at 15:32

## 2022-12-03 RX ADMIN — INSULIN GLARGINE 70 UNITS: 100 INJECTION, SOLUTION SUBCUTANEOUS at 10:28

## 2022-12-03 RX ADMIN — FUROSEMIDE 40 MG: 10 INJECTION, SOLUTION INTRAMUSCULAR; INTRAVENOUS at 05:49

## 2022-12-03 RX ADMIN — POTASSIUM CHLORIDE 20 MEQ: 29.8 INJECTION, SOLUTION INTRAVENOUS at 11:45

## 2022-12-03 RX ADMIN — BUSPIRONE HYDROCHLORIDE 15 MG: 5 TABLET ORAL at 08:55

## 2022-12-03 RX ADMIN — HEPARIN SODIUM 5000 UNITS: 5000 INJECTION INTRAVENOUS; SUBCUTANEOUS at 09:19

## 2022-12-03 RX ADMIN — POLYETHYLENE GLYCOL 3350 17 G: 17 POWDER, FOR SOLUTION ORAL at 08:57

## 2022-12-03 RX ADMIN — Medication 4 UNITS: at 18:18

## 2022-12-03 RX ADMIN — CASTOR OIL AND BALSAM, PERU: 788; 87 OINTMENT TOPICAL at 08:57

## 2022-12-03 RX ADMIN — MEROPENEM 1 G: 1 INJECTION, POWDER, FOR SOLUTION INTRAVENOUS at 01:40

## 2022-12-03 RX ADMIN — METOCLOPRAMIDE 5 MG: 5 INJECTION, SOLUTION INTRAMUSCULAR; INTRAVENOUS at 23:11

## 2022-12-03 RX ADMIN — SODIUM CHLORIDE 40 MG: 9 INJECTION, SOLUTION INTRAMUSCULAR; INTRAVENOUS; SUBCUTANEOUS at 09:19

## 2022-12-03 RX ADMIN — SODIUM CHLORIDE, PRESERVATIVE FREE 10 ML: 5 INJECTION INTRAVENOUS at 15:42

## 2022-12-03 RX ADMIN — BUSPIRONE HYDROCHLORIDE 15 MG: 5 TABLET ORAL at 21:10

## 2022-12-03 RX ADMIN — LORAZEPAM 0.5 MG: 2 INJECTION INTRAMUSCULAR; INTRAVENOUS at 01:36

## 2022-12-03 RX ADMIN — FUROSEMIDE 40 MG: 10 INJECTION, SOLUTION INTRAMUSCULAR; INTRAVENOUS at 15:37

## 2022-12-03 RX ADMIN — CASTOR OIL AND BALSAM, PERU: 788; 87 OINTMENT TOPICAL at 21:10

## 2022-12-03 RX ADMIN — MORPHINE SULFATE 10 MG: 10 SOLUTION ORAL at 11:08

## 2022-12-03 NOTE — PROGRESS NOTES
0700  Bedside and verbal report from Edgar Akins RN and Jessa Cheng RN. Patient sitting up in the chair, anxious at intervals. 0800  Assessment completed. Patient anxious at intervals. 0830  PT and OT with patient. Walked in room and hallway. 0908  Ativan 1 mg per NG tube given for anxiety. 1000  Continues restless, anxious at times. 1100  Speech therapy at bedside. After screening, patient safe for nectar thick liquids. 1108  Medicated with morphine 10 mg via NG tube for pain, per patient request.     1130  Incontinent of large amount brown liquid stool in chair, then to [facility] where another large liquid brown stool was passed. Incontinence care completed, then patient back to chair. 1145  NG tube removed. 1200  Reassessment completed. 1400  Taking nectar thickened liquids without difficulty.    Viral cultures sent to lab per YUVAL MENENDEZ. Blood also sent for rheumatoid factor, ANCA panel, WENDY, and sed rate. 1600  Reassessment completed. To [facility] with 2 assists, passed another large brown liquid stool. Pericare completed, then patient assisted back to bed. 1800  Family members at bedside. Took diet fair.     1900  Bedside and verbal report given to Angel Ness RN

## 2022-12-03 NOTE — PROGRESS NOTES
Problem: Mobility Impaired (Adult and Pediatric)  Goal: *Acute Goals and Plan of Care (Insert Text)  Description: FUNCTIONAL STATUS PRIOR TO ADMISSION: Patient was independent and active without use of DME. Still driving. Denies home O2 use. States he is active at baseline, performing yard work and working part-time as a . HOME SUPPORT PRIOR TO ADMISSION: The patient lived with mother. Physical Therapy Goals  Revised 12/2/2022  1. Patient will move from supine to sit and sit to supine  in bed with supervision/set-up within 7 days. 2.  Patient will perform sit to/from stand with CGA within 7 days. 3.  Patient will ambulate 200 feet with least restrictive assistive device and contact guard assist within 7 days. 4.  Patient will ascend/descend 3 stairs with 1 handrail(s) with contact guard assist within 7 days. 5.  Patient will perform cardiac exercises per protocol with independence within 7 days. 6.  Patient will verbally recall and functionally demonstrate mindful-based movements (\"move in the tube\") principles without cues within 7 days. Physical Therapy Goals  Initiated 11/23/2022  1. Patient will move from supine to sit and sit to supine , scoot up and down, and roll side to side in bed with modified independence within 5 days. 2.  Patient will perform sit to/from stand with modified independence within 5 days. 3.  Patient will ambulate 300 feet with least restrictive assistive device and independence within 5 days. 4.  Patient will ascend/descend 3 stairs with 1 handrail(s) with modified independence within 5 days. 5.  Patient will perform cardiac exercises per protocol with independence within 5 days. 6.  Patient will verbally and functionally demonstrate 3/3 sternal precautions without cues within 5 days.          Outcome: Progressing Towards Goal   PHYSICAL THERAPY TREATMENT  Patient: Alexa Mooney (36 y.o. male)  Date: 12/3/2022  Diagnosis: Acute non-Q wave non-ST elevation myocardial infarction (NSTEMI) ([facility]) [I21.4]  Aortic stenosis [I35.0]  CAD (coronary artery disease) [I25.10] <principal problem not specified>  Procedure(s) (LRB):  SYLWIA BY DR Ceci Richard -  CORONARY ARTERY BYPASS GRAFT  X 1 WITH LEFT INTERNAL MAMMARY ARTERY GRAFTING - AORTIC VALVE REPLACEMENT WITH MEYER 25MM INSPIRIS RESILIA TISSUE VALVE AND REPAIR OF ASCENDING AORTIC ANEURYSM (N/A) 11 Days Post-Op  Precautions: Sternal (move in the tube)  Chart, physical therapy assessment, plan of care and goals were reviewed. ASSESSMENT  Patient continues with skilled PT services and is progressing towards goals. Patient sitting in chair upon arrival, asking for water but not yet cleared for liquids from Speech therapy. Patient demonstrating poor functional use of UEs/hands requiring minimal A to bring oral swab to mouth and to perform cardiac exercises. Patient appears somewhat anxious about mobility but is agreeable with encouragement. He requires CGA/min A of 2 for sit<>stand transfers. Standing balance is fair overall demonstrating widened base of support and increased trunk sway. Patient able to ambulate 12 feet today using rollator for support. He remained in chair at end of session. .     Patient is not verbalizing and is not demonstrating understanding of mindful-based movements (\"move in the tube\") principles of keeping UEs proximal to ribcage to prevent lateral pull on the sternum during load-bearing activities with visual, verbal, manual, and tactile cues required for compliance. Current Level of Function Impacting Discharge (mobility/balance): CGA/min A of 1-2    Recommend inpatient rehab following discharge to regain functional independence prior to returning home with support of family         PLAN :  Patient continues to benefit from skilled intervention to address the above impairments. Continue treatment per established plan of care. to address goals.     Recommendation for discharge: (in order for the patient to meet his/her long term goals)  Therapy 3 hours per day 5-7 days per week    This discharge recommendation:  Has not yet been discussed the attending provider and/or case management    IF patient discharges home will need the following DME: to be determined (TBD)       SUBJECTIVE:   Patient stated I don't know if I can walk. I might fall.     OBJECTIVE DATA SUMMARY:   Patient mobilized on continuous portable monitor/telemetry. Critical Behavior:  Neurologic State: Alert  Orientation Level: Oriented X4  Cognition: Appropriate decision making, Follows commands  Safety/Judgement: Awareness of environment, Insight into deficits, Fall prevention    Functional Mobility Training:  Bed Mobility:                      Transfers:  Sit to Stand: Contact guard assistance;Minimum assistance;Assist x2  Stand to Sit: Minimum assistance;Assist x1                               Balance:  Sitting: Impaired  Sitting - Static: Good (unsupported)  Sitting - Dynamic: Fair (occasional)  Standing: Impaired  Standing - Static: Fair;Constant support  Standing - Dynamic : Fair;Constant support    Ambulation/Gait Training:  Distance (ft): 12 Feet (ft)  Assistive Device: Walker, rollator;Gait belt  Ambulation - Level of Assistance: Minimal assistance        Gait Abnormalities: Decreased step clearance;Shuffling gait        Base of Support: Widened     Speed/Juliet: Pace decreased (<100 feet/min); Shuffled; Slow  Step Length: Left shortened;Right shortened         Gait is slow and shuffled; unsteady demonstrating decreased proximal trunk control with increased trunk sway       Cardiac diagnosis intervention:  Patient instructed and educated on mindful movement principles based on Move in The Tube concept to include maintaining bilateral elbows close to rib cage when performing any load-bearing activity such as getting in/out of bed, pushing up from a chair, opening a door, or lifting a box.   Patient was given a handout with diagrams of each correct/incorrect method of performing each of the above tasks. Therapeutic Exercises:   Patient instructed on the benefits and demonstrated cardiac exercises while seated with Minimum assistance. Instructed and indicated understanding on how to progress reps, sets against gravity, pacing through progressive muscle strengthening standing based on surgeon clearance for more weight in prep for functional activity. Instruction on the use of household items in place of weights as needed.      CARDIAC  EXERCISE   Sets   Reps   Active Active Assist   Passive Self ROM   Comments   Shoulder flexion 1 5 []                                            [x]                                            []                                            []                                               Shoulder abduction 1      5 []                                            [x]                                            []                                            []                                               Scapular elevation 1 5 []                                            [x]                                            []                                            []                                               Scapular retraction 1 5 []                                            [x]                                            []                                            []                                               Trunk rotation 1 5 []                                            [x]                                            []                                            [x]                                               Trunk sidebending 1 5 []                                            [x]                                            []                                            []                                                  []                                            [] []                                            []                                                     Activity Tolerance:   Fair    After treatment patient left in no apparent distress:   Sitting in chair and Call bell within reach    COMMUNICATION/COLLABORATION:   The patients plan of care was discussed with: Registered nurse and Rehabilitation technician.      Alejandro Gupta, PT   Time Calculation: 31 mins

## 2022-12-03 NOTE — PROGRESS NOTES
Problem: Dysphagia (Adult)  Goal: *Acute Goals and Plan of Care (Insert Text)  Description: Speech path goals  1. Patient will tolerate ice chips only by small amount with staff. Goal met 12/3/22  2. Patient will participate with reeval of swallowing. Completed 12/3/22  3. Patient will tolerate easy to chew and nectar thick liquids with no overt s/s of aspiration. 4. Patient will participate with imaging of swallowing as needed   Outcome: Progressing Towards Goal   SPEECH LANGUAGE PATHOLOGY DYSPHAGIA TREATMENT  Patient: Blu Gonzalez (8 y.o. male)  Date: 12/3/2022  Diagnosis: Acute non-Q wave non-ST elevation myocardial infarction (NSTEMI) ([facility]) [I21.4]  Aortic stenosis [I35.0]  CAD (coronary artery disease) [I25.10] <principal problem not specified>  Procedure(s) (LRB):  SYLWIA BY DR Olga Carranza -  CORONARY ARTERY BYPASS GRAFT  X 1 WITH LEFT INTERNAL MAMMARY ARTERY GRAFTING - AORTIC VALVE REPLACEMENT WITH MEYER 25MM INSPIRIS RESILIA TISSUE VALVE AND REPAIR OF ASCENDING AORTIC ANEURYSM (N/A) 11 Days Post-Op  Precautions:  Sternal (move in the tube)    ASSESSMENT:  This patient is a 37 yo who had a CABG x1 and AVR. He was intubated twice for total of 5-6 days. His voice and cough are wnl. He coughed after thins; strong cough. Tolerated purees and soft solids well. He had a delayed cough after drinking 4 ounces of mildly thick liquids. Suspect he will do well with po. He will need to be fed due to his upper ext edema. He needs to try to feed himself. PLAN:  Recommendations and Planned Interventions:  Initiate easy to chew diet with mildly thick liquids. Reeval Monday. Imaging as needed. Patient continues to benefit from skilled intervention to address the above impairments. Continue treatment per established plan of care. Discharge Recommendations: To Be Determined     SUBJECTIVE:   Patient asked if he could have the rest of the applesauce.      OBJECTIVE:   Cognitive and Communication Status:  Neurologic State: Alert  Orientation Level: Oriented X4  Cognition: Appropriate decision making, Follows commands  Perception: Appears intact  Perseveration: Perseverates during mobility (on moaning)  Safety/Judgement: Awareness of environment, Insight into deficits, Fall prevention  Dysphagia Treatment:  Oral Assessment:     P.O. Trials:  Patient Position: upright in bed  Vocal quality prior to P.O.: No impairment  Consistency Presented: Thin liquid;Puree;Mechanical soft; Nectar thick liquid  How Presented: Self-fed/presented;Straw     Bolus Acceptance: No impairment  Bolus Formation/Control: No impairment     Propulsion: No impairment  Oral Residue: None  Initiation of Swallow: No impairment  Laryngeal Elevation: Decreased  Aspiration Signs/Symptoms: Strong cough (after thins immediately and after nectar thick /delayed)                Oral Phase Severity: No impairment  Pharyngeal Phase Severity : Mild-moderate         Pain:  Pain Scale 1: Numeric (0 - 10)  Pain Intensity 1: 0       After treatment:   Patient left in no apparent distress in bed    COMMUNICATION/EDUCATION:   Educated him that he could start a diet   The patient's plan of care including recommendations, planned interventions, and recommended diet changes were discussed with: Registered nurse.      Caroline Brannon SLP  Time Calculation: 15 mins

## 2022-12-03 NOTE — PROGRESS NOTES
Stable on high flow O2  Up on side of bed  Hemodynamics stable  No major changes  Will resume feeds when off high flow

## 2022-12-03 NOTE — PROGRESS NOTES
1910- Bedside shift change report given to CAMI Strauss RN and Michael Becker RN (oncoming nurses) by Demarco Victor RN (offgoing nurse). Report included the following information SBAR, Kardex, Intake/Output, MAR, Med Rec Status, and Cardiac Rhythm NSR .      2000- Shift assessment completed. Patient alert and oriented x4. Following all commands. PERRLA. NSR on the monitor. Pedal and radial pulses palpable. +2 edema noted in all extremities. On midflow nasal cannula, breath sounds slightly coarse and diminished bilaterally. NGT R nare infusing. Strong catheter in place, draining appropriately. Strong care completed at this time. Lines:  PIV x2  R MAC double lumen    2135- PRN dilaudid given at this time for pain. 2320- Patient requesting bipap. Respiratory placed bipap on at this time. Spoke with Caryn Rodriguez NP about patients tube feeding. Will hold TF at this time and resume in morning once patient is off of bipap.    0000- Reassessment completed. No changes noted. 4788- Patient reports feeling anxious. PRN ativan given at this time. 0400- Reassessment completed. No changes noted. Patient requesting to come off of bipap and back on midflow. Respiratory called at this time. 3965- Patient up to chair at this time. 6052- Bedside shift change report given to Jaden Shi RN (oncoming nurse) by CAMI Strauss RN and Michael Becker RN (offgoing nurses).  Report included the following information SBAR, Kardex, ED Summary, OR Summary, Intake/Output, MAR, Recent Results, Med Rec Status, and Cardiac Rhythm nsr-st .

## 2022-12-03 NOTE — PROGRESS NOTES
Critical Care Progress Note  Juan Manuel Alas MD          Date of Service:  12/3/2022  NAME:  Radha Concepcion  :  1980  MRN:  [id number]      Subjective/Hospital course:      35 y/o male POD 1 from CABG x1 an AV valve replacement. He arrived intubated and on pressors. Extubated  on POD 0 @ 19:00. Noted to have SERGE with creatinine rise 2. Possibly secondary to hypotension in OR. Night of  was worked up for hypoxemia. This morning he tested positive for Covid. Case discussed with cT surgery. Most likely diagnosis is PE most likely secondary to hypercoaguable state from Covid. Pt had no respiratory symptoms preop or post op suggestive of URI      - Overnight developed hypoxemia. Pt had bronch and SYLWIA neither of which explained cause for hypoxemia. He required re-intubation and was put on veletri.  - intermittent desaturation events. CXR this a.m. with pulmonary edema   - fio2 improved   : fio2 and PEEP requirement has increased, still requiring epi at 2 mcg/min   : remains on epinephrine drip, sedated, fio2 requirement decreased to 80%'LFT's are trending   Down   : remains on epi drip, diueresed well with lasix drip,fio2 decreased to 50% and peep to 6  : Patient remains intubated and sedated. Being diuresed with lasix gtt. : Remains intubated, sedation is being weaned, able to follow commands. : Reports feeling better, extubated on . On high flow oxygen. 12/3: om mid flow at 6 L, bipap at night, c/o pain and want something for pain      Assessment/Plan:     PULMONARY:  Acute hypoxic resopiratory failure   Covid rapid +, PCR negative  D dimer / LE doppler negative - unlikely PE  Pulmonary edema/ pleural effusions  Ct chest : no PE  Plan  - - Continue nebulized bronchodilators and steroids  - Continue diuresis as tolerated. CARDIOVASCULAR/HEMODYNAMIC:  Off pressors.     Plan    - ICU hemodynamic/cardiac monitoring  - MAP goal > 65 mmHg      RENAL:  SERGE : improving    Plan  - Monitor I/Os and UO  - Monitor renal function panel intermittently  - Correct electrolytes as needed  - Avoid nephrotoxic meds      GI/NUTRITION:  Continue reglan  Laxatives to move bowels  D/c NGT and re evaluate with speech     INFECTIOUS DISEASE  Covid ruled out with 2 negative PCR  Intermittent fevers      Micro: all cultures negative    Plan  Continue to observe off meds    NEURO  No acute issues at this time    ENDO : TSH is elevated  Continue synthroid to 150 mcg once a day    Plan    - Daily SAT when meets ICU criteria  - ABCDEF mobility bundle  - PT/OT involvement when appropriate          Care Plan discussed with: attending, CTS NP and bedside nurse, GI , radiology    I personally spent 40 minutes of critical care time. This is time spent at this critically ill patient's bedside actively involved in patient care as well as the coordination of care and discussions with the patient's family. This does not include any procedural time which has been billed separately.       Review of Systems:   ROS   +ve for pain    Vital Signs:   Patient Vitals for the past 4 hrs:   BP Temp Pulse Resp SpO2   12/03/22 0910 -- (!) 101.1 °F (38.4 °C) (!) 109 20 98 %   12/03/22 0905 (!) 117/58 -- -- -- --   12/03/22 0900 (!) 90/52 -- -- -- --   12/03/22 0847 (!) 136/57 -- (!) 109 -- 97 %   12/03/22 0846 (!) 136/57 (!) 100.8 °F (38.2 °C) (!) 106 (!) 35 97 %   12/03/22 0800 136/72 (!) 100.8 °F (38.2 °C) (!) 114 22 95 %   12/03/22 0700 (!) 144/67 (!) 100.8 °F (38.2 °C) (!) 112 12 96 %   12/03/22 0600 (!) 146/58 (!) 100.6 °F (38.1 °C) (!) 103 (!) 0 96 %          Intake/Output Summary (Last 24 hours) at 12/3/2022 0919  Last data filed at 12/3/2022 0800  Gross per 24 hour   Intake 2233.75 ml   Output 2770 ml   Net -536.25 ml          Physical Examination:    Young, well built  HEENT: normal  Heart: s1,s2  Lungs: clear  Abd: soft  Ext: no edema  Cns: AAO x4    Labs and Imaging: Reviewed.       Medications:     Current Facility-Administered Medications   Medication Dose Route Frequency    HYDROcodone-chlorpheniramine (TUSSIONEX) oral suspension 10 mL  10 mL Oral Q6H PRN    furosemide (LASIX) injection 40 mg  40 mg IntraVENous Q8H    LORazepam (ATIVAN) injection 0.5 mg  0.5 mg IntraVENous Q8H PRN    albuterol-ipratropium (DUO-NEB) 2.5 MG-0.5 MG/3 ML  3 mL Nebulization BID RT    scopolamine (TRANSDERM-SCOP) 1 mg over 3 days 1 Patch  1 Patch TransDERmal Q72H    insulin glargine (LANTUS) injection 70 Units  70 Units SubCUTAneous Q12H    insulin lispro (HUMALOG) injection   SubCUTAneous Q6H    glucose chewable tablet 16 g  4 Tablet Oral PRN    0.9% sodium chloride infusion 250 mL  250 mL IntraVENous PRN    bisacodyL (DULCOLAX) suppository 10 mg  10 mg Rectal DAILY    senna-docusate (PERICOLACE) 8.6-50 mg per tablet 2 Tablet  2 Tablet Oral DAILY    levothyroxine (SYNTHROID) tablet 150 mcg  150 mcg Oral 6am    balsam peru-castor oiL (VENELEX) ointment   Topical BID    heparin (porcine) injection 5,000 Units  5,000 Units SubCUTAneous Q8H    meropenem (MERREM) 1 g in 0.9% sodium chloride (MBP/ADV) 50 mL MBP  1 g IntraVENous Q8H    metoclopramide HCl (REGLAN) injection 5 mg  5 mg IntraVENous Q6H    pantoprazole (PROTONIX) 40 mg in 0.9% sodium chloride 10 mL injection  40 mg IntraVENous DAILY    dexmedeTOMidine in 0.9 % NaCl (PRECEDEX) 400 mcg/100 mL (4 mcg/mL) infusion soln  0.1-1.5 mcg/kg/hr IntraVENous TITRATE    0.9% sodium chloride infusion  3 mL/hr IntraVENous CONTINUOUS    HYDROmorphone (DILAUDID) injection 1 mg  1 mg IntraVENous Q4H PRN    HYDROmorphone (DILAUDID) injection 0.5 mg  0.5 mg IntraVENous Q4H PRN    sodium chloride (NS) flush 5-40 mL  5-40 mL IntraVENous Q8H    sodium chloride (NS) flush 5-40 mL  5-40 mL IntraVENous PRN    bacitracin 500 unit/gram packet 1 Packet  1 Packet Topical PRN    0.45% sodium chloride infusion  10 mL/hr IntraVENous CONTINUOUS    naloxone (NARCAN) injection 0.4 mg  0.4 mg IntraVENous PRN    ondansetron (ZOFRAN) injection 4 mg  4 mg IntraVENous Q4H PRN    albuterol (PROVENTIL VENTOLIN) nebulizer solution 2.5 mg  2.5 mg Nebulization Q4H PRN    aspirin chewable tablet 81 mg  81 mg Oral DAILY    calcium chloride 1 g in 0.9% sodium chloride 250 mL IVPB  1 g IntraVENous PRN    polyethylene glycol (MIRALAX) packet 17 g  17 g Oral DAILY    ELECTROLYTE REPLACEMENT NOTE: Nurse to review Serum Potassium and Magnesuim levels and Initiate Electrolyte Replacement Protocol as needed  1 Each Other PRN    magnesium sulfate 1 g/100 ml IVPB (premix or compounded)  1 g IntraVENous PRN    glucagon (GLUCAGEN) injection 1 mg  1 mg IntraMUSCular PRN    lactated ringers bolus infusion 250 mL  250 mL IntraVENous Q1H PRN    dextrose 10 % infusion 0-250 mL  0-250 mL IntraVENous PRN    melatonin tablet 3-6 mg  3-6 mg Oral QHS PRN    lidocaine 4 % patch 2 Patch  2 Patch TransDERmal Q24H    LORazepam (ATIVAN) tablet 1 mg  1 mg Oral Q8H PRN    sertraline (ZOLOFT) tablet 100 mg  100 mg Oral DAILY    busPIRone (BUSPAR) tablet 15 mg  15 mg Oral TID     ______________________________________________________________________  EXPECTED LENGTH OF STAY: 1d 19h  ACTUAL LENGTH OF STAY:          5351 Kevin Gant MD   Pulmonary/CCM  [address] [phone]

## 2022-12-03 NOTE — PROGRESS NOTES
SPEECH LANGUAGE PATHOLOGY BEDSIDE SWALLOW EVALUATION  Patient: Jeffery Perdomo (18 y.o. male)  Date: 12/2/2022  Primary Diagnosis: Acute non-Q wave non-ST elevation myocardial infarction (NSTEMI) (HCC) [I21.4]  Aortic stenosis [I35.0]  CAD (coronary artery disease) [I25.10]  Procedure(s) (LRB):  SYLWIA BY DR Eric Liz -  CORONARY ARTERY BYPASS GRAFT  X 1 WITH LEFT INTERNAL MAMMARY ARTERY GRAFTING - AORTIC VALVE REPLACEMENT WITH MEYER 25MM INSPIRIS RESILIA TISSUE VALVE AND REPAIR OF ASCENDING AORTIC ANEURYSM (N/A) 11 Days Post-Op   Precautions:   Sternal (move in the tube)    ASSESSMENT :  Based on the objective data described below, the patient presents with cough after sips and purees. Recommend ice only for the night and will reeval swallowing tomorrow. Jessica Rodriguez PLAN :  Recommendations and Planned Interventions: Ice only   NPO  Frequency/Duration: Patient will be followed by speech-language pathology 3 times a week to address goals. Discharge Recommendations: To Be Determined     SUBJECTIVE:   Patient not very interactive.   OBJECTIVE:     Past Medical History:   Diagnosis Date    Anxiety and depression     anxiety, depression    Bleeding of eye, left     8/17/21 pt reports receiving injections in right eye for beeding    Chronic headaches 2007    COVID-19 12/20/2020    Diabetes type 1, uncontrolled     since 8years old    GERD (gastroesophageal reflux disease)     Hypercholesterolemia     Hypertension     Hypothyroidism     MI (myocardial infarction) ([facility] [address]     as of 8/17/21 pt reports 9 stents total    WALLY on CPAP      Past Surgical History:   Procedure Laterality Date    HX CARPAL TUNNEL RELEASE Left 2012    HX CARPAL TUNNEL RELEASE Right 2018    CTE trigger thumb and index finger    HX HEART CATHETERIZATION      HX HEENT      HX LAP CHOLECYSTECTOMY  8/26/14    Dr Kevin Mccarty    HX WISDOM TEETH EXTRACTION       Prior Level of Function/Home Situation:   Home Situation  Home Environment: Private residence  # Steps to Enter: 3  Rails to [facility]: Yes  One/Two Story Residence: One story  Living Alone: No  Support Systems: Parent(s)  Patient Expects to be Discharged to[de-identified] Home  Current DME Used/Available at Home: None  Tub or Shower Type: Tub/Shower combination  Diet prior to admission: reg/thins  Current Diet:  NPO   Cognitive and Communication Status:  Neurologic State: Alert  Orientation Level: Oriented X4  Cognition: Follows commands  Perception: Appears intact  Perseveration: Perseverates during mobility (on moaning)  Safety/Judgement: Awareness of environment, Insight into deficits, Fall prevention  Oral Assessment:     P.O. Trials:     Vocal quality prior to P.O.:        Cough after thins and purees. Intermittent cough      NOMS:   The NOMS functional outcome measure was used to quantify this patient's level of swallowing impairment. Based on the NOMS, the patient was determined to be at level 2 for swallow function       NOMS Swallowing Levels:  Level 1 (CN): NPO  Level 2 (CM): NPO but takes consistency in therapy  Level 3 (CL): Takes less than 50% of nutrition p.o. and continues with nonoral feedings; and/or safe with mod cues; and/or max diet restriction  Level 4 (CK): Safe swallow but needs mod cues; and/or mod diet restriction; and/or still requires some nonoral feeding/supplements  Level 5 (CJ): Safe swallow with min diet restriction; and/or needs min cues  Level 6 (CI): Independent with p.o.; rare cues; usually self cues; may need to avoid some foods or needs extra time  Level 7 ([address] [place] [address]): Independent for all p.o.  BAUDILIO. (2003). National Outcomes Measurement System (NOMS): Adult Speech-Language Pathology User's Guide.        Pain:  Pain Scale 1: Numeric (0 - 10)  Pain Intensity 1: 0       After treatment:   Patient left in no apparent distress in bed    COMMUNICATION/EDUCATION:     The patient's plan of care including recommendations, planned interventions, and recommended diet changes were discussed with: Registered nurse. Patient is unable to participate in goal setting and plan of care.     Thank you for this referral.  Rosalee Boas, SLP  Time Calculation: 15 mins

## 2022-12-04 ENCOUNTER — APPOINTMENT (OUTPATIENT)
Dept: GENERAL RADIOLOGY | Age: 42
End: 2022-12-04
Attending: PHYSICIAN ASSISTANT
Payer: COMMERCIAL

## 2022-12-04 LAB
ALBUMIN SERPL-MCNC: 2.7 G/DL (ref 3.5–5)
ALBUMIN/GLOB SERPL: 0.9 {RATIO} (ref 1.1–2.2)
ALP SERPL-CCNC: 248 U/L (ref 45–117)
ALT SERPL-CCNC: 248 U/L (ref 12–78)
ANION GAP SERPL CALC-SCNC: 6 MMOL/L (ref 5–15)
AST SERPL-CCNC: 80 U/L (ref 15–37)
BASOPHILS # BLD: 0.1 K/UL (ref 0–0.1)
BASOPHILS NFR BLD: 1 % (ref 0–1)
BILIRUB DIRECT SERPL-MCNC: 0.3 MG/DL (ref 0–0.2)
BILIRUB SERPL-MCNC: 0.6 MG/DL (ref 0.2–1)
BUN SERPL-MCNC: 32 MG/DL (ref 6–20)
BUN/CREAT SERPL: 33 (ref 12–20)
CALCIUM SERPL-MCNC: 8.4 MG/DL (ref 8.5–10.1)
CHLORIDE SERPL-SCNC: 110 MMOL/L (ref 97–108)
CO2 SERPL-SCNC: 32 MMOL/L (ref 21–32)
CREAT SERPL-MCNC: 0.98 MG/DL (ref 0.7–1.3)
CRP SERPL-MCNC: 3.4 MG/DL (ref 0–0.6)
D DIMER PPP FEU-MCNC: 3.75 MG/L FEU (ref 0–0.65)
DIFFERENTIAL METHOD BLD: ABNORMAL
EOSINOPHIL # BLD: 1.4 K/UL (ref 0–0.4)
EOSINOPHIL NFR BLD: 11 % (ref 0–7)
ERYTHROCYTE [DISTWIDTH] IN BLOOD BY AUTOMATED COUNT: 16 % (ref 11.5–14.5)
GLOBULIN SER CALC-MCNC: 2.9 G/DL (ref 2–4)
GLUCOSE BLD STRIP.AUTO-MCNC: 161 MG/DL (ref 65–117)
GLUCOSE BLD STRIP.AUTO-MCNC: 168 MG/DL (ref 65–117)
GLUCOSE BLD STRIP.AUTO-MCNC: 213 MG/DL (ref 65–117)
GLUCOSE BLD STRIP.AUTO-MCNC: 220 MG/DL (ref 65–117)
GLUCOSE BLD STRIP.AUTO-MCNC: 56 MG/DL (ref 65–117)
GLUCOSE BLD STRIP.AUTO-MCNC: 56 MG/DL (ref 65–117)
GLUCOSE SERPL-MCNC: 84 MG/DL (ref 65–100)
HCT VFR BLD AUTO: 30.6 % (ref 36.6–50.3)
HGB BLD-MCNC: 9.2 G/DL (ref 12.1–17)
IMM GRANULOCYTES # BLD AUTO: 0 K/UL (ref 0–0.04)
IMM GRANULOCYTES NFR BLD AUTO: 0 % (ref 0–0.5)
INR PPP: 1.2 (ref 0.9–1.1)
LYMPHOCYTES # BLD: 2.1 K/UL (ref 0.8–3.5)
LYMPHOCYTES NFR BLD: 17 % (ref 12–49)
MAGNESIUM SERPL-MCNC: 2.1 MG/DL (ref 1.6–2.4)
MCH RBC QN AUTO: 27.3 PG (ref 26–34)
MCHC RBC AUTO-ENTMCNC: 30.1 G/DL (ref 30–36.5)
MCV RBC AUTO: 90.8 FL (ref 80–99)
METAMYELOCYTES NFR BLD MANUAL: 2 %
MONOCYTES # BLD: 0.5 K/UL (ref 0–1)
MONOCYTES NFR BLD: 4 % (ref 5–13)
NEUTS SEG # BLD: 8.1 K/UL (ref 1.8–8)
NEUTS SEG NFR BLD: 65 % (ref 32–75)
NRBC # BLD: 0 K/UL (ref 0–0.01)
NRBC BLD-RTO: 0 PER 100 WBC
PLATELET # BLD AUTO: 260 K/UL (ref 150–400)
PMV BLD AUTO: 9.1 FL (ref 8.9–12.9)
POTASSIUM SERPL-SCNC: 3.6 MMOL/L (ref 3.5–5.1)
PROCALCITONIN SERPL-MCNC: 0.14 NG/ML
PROT SERPL-MCNC: 5.6 G/DL (ref 6.4–8.2)
PROTHROMBIN TIME: 12.7 SEC (ref 9–11.1)
RBC # BLD AUTO: 3.37 M/UL (ref 4.1–5.7)
RBC MORPH BLD: ABNORMAL
RBC MORPH BLD: ABNORMAL
RHEUMATOID FACT SERPL-ACNC: <10 IU/ML
SERVICE CMNT-IMP: ABNORMAL
SODIUM SERPL-SCNC: 148 MMOL/L (ref 136–145)
WBC # BLD AUTO: 12.5 K/UL (ref 4.1–11.1)

## 2022-12-04 PROCEDURE — 94640 AIRWAY INHALATION TREATMENT: CPT

## 2022-12-04 PROCEDURE — 80048 BASIC METABOLIC PNL TOTAL CA: CPT

## 2022-12-04 PROCEDURE — 74011000250 HC RX REV CODE- 250: Performed by: PHYSICIAN ASSISTANT

## 2022-12-04 PROCEDURE — 85025 COMPLETE CBC W/AUTO DIFF WBC: CPT

## 2022-12-04 PROCEDURE — 97116 GAIT TRAINING THERAPY: CPT

## 2022-12-04 PROCEDURE — 83735 ASSAY OF MAGNESIUM: CPT

## 2022-12-04 PROCEDURE — C9113 INJ PANTOPRAZOLE SODIUM, VIA: HCPCS | Performed by: INTERNAL MEDICINE

## 2022-12-04 PROCEDURE — 74011250637 HC RX REV CODE- 250/637: Performed by: NURSE PRACTITIONER

## 2022-12-04 PROCEDURE — 74011250636 HC RX REV CODE- 250/636: Performed by: THORACIC SURGERY (CARDIOTHORACIC VASCULAR SURGERY)

## 2022-12-04 PROCEDURE — 82962 GLUCOSE BLOOD TEST: CPT

## 2022-12-04 PROCEDURE — 74011250637 HC RX REV CODE- 250/637: Performed by: PHYSICIAN ASSISTANT

## 2022-12-04 PROCEDURE — 74011000250 HC RX REV CODE- 250: Performed by: INTERNAL MEDICINE

## 2022-12-04 PROCEDURE — 71045 X-RAY EXAM CHEST 1 VIEW: CPT

## 2022-12-04 PROCEDURE — 74011250636 HC RX REV CODE- 250/636: Performed by: INTERNAL MEDICINE

## 2022-12-04 PROCEDURE — 77010033711 HC HIGH FLOW OXYGEN

## 2022-12-04 PROCEDURE — 74011000258 HC RX REV CODE- 258: Performed by: INTERNAL MEDICINE

## 2022-12-04 PROCEDURE — 84145 PROCALCITONIN (PCT): CPT

## 2022-12-04 PROCEDURE — 97110 THERAPEUTIC EXERCISES: CPT

## 2022-12-04 PROCEDURE — 74011250637 HC RX REV CODE- 250/637: Performed by: INTERNAL MEDICINE

## 2022-12-04 PROCEDURE — 74011000250 HC RX REV CODE- 250: Performed by: ANESTHESIOLOGY

## 2022-12-04 PROCEDURE — 36415 COLL VENOUS BLD VENIPUNCTURE: CPT

## 2022-12-04 PROCEDURE — 86140 C-REACTIVE PROTEIN: CPT

## 2022-12-04 PROCEDURE — 74011250637 HC RX REV CODE- 250/637: Performed by: HOSPITALIST

## 2022-12-04 PROCEDURE — 65610000006 HC RM INTENSIVE CARE

## 2022-12-04 PROCEDURE — 74011250636 HC RX REV CODE- 250/636: Performed by: NURSE PRACTITIONER

## 2022-12-04 PROCEDURE — 80076 HEPATIC FUNCTION PANEL: CPT

## 2022-12-04 PROCEDURE — 74011636637 HC RX REV CODE- 636/637: Performed by: HOSPITALIST

## 2022-12-04 PROCEDURE — 74011000250 HC RX REV CODE- 250: Performed by: NURSE PRACTITIONER

## 2022-12-04 PROCEDURE — 85610 PROTHROMBIN TIME: CPT

## 2022-12-04 PROCEDURE — 85379 FIBRIN DEGRADATION QUANT: CPT

## 2022-12-04 RX ORDER — POTASSIUM CHLORIDE 29.8 MG/ML
20 INJECTION INTRAVENOUS
Status: COMPLETED | OUTPATIENT
Start: 2022-12-04 | End: 2022-12-04

## 2022-12-04 RX ADMIN — SERTRALINE 100 MG: 50 TABLET, FILM COATED ORAL at 09:32

## 2022-12-04 RX ADMIN — FUROSEMIDE 40 MG: 10 INJECTION, SOLUTION INTRAMUSCULAR; INTRAVENOUS at 14:28

## 2022-12-04 RX ADMIN — CASTOR OIL AND BALSAM, PERU: 788; 87 OINTMENT TOPICAL at 21:14

## 2022-12-04 RX ADMIN — INSULIN GLARGINE 70 UNITS: 100 INJECTION, SOLUTION SUBCUTANEOUS at 12:00

## 2022-12-04 RX ADMIN — IPRATROPIUM BROMIDE AND ALBUTEROL SULFATE 3 ML: 2.5; .5 SOLUTION RESPIRATORY (INHALATION) at 20:10

## 2022-12-04 RX ADMIN — FUROSEMIDE 40 MG: 10 INJECTION, SOLUTION INTRAMUSCULAR; INTRAVENOUS at 21:14

## 2022-12-04 RX ADMIN — POTASSIUM CHLORIDE 20 MEQ: 29.8 INJECTION INTRAVENOUS at 05:38

## 2022-12-04 RX ADMIN — MEROPENEM 1 G: 1 INJECTION, POWDER, FOR SOLUTION INTRAVENOUS at 03:09

## 2022-12-04 RX ADMIN — HEPARIN SODIUM 5000 UNITS: 5000 INJECTION INTRAVENOUS; SUBCUTANEOUS at 09:32

## 2022-12-04 RX ADMIN — LEVOTHYROXINE SODIUM 150 MCG: 0.07 TABLET ORAL at 05:58

## 2022-12-04 RX ADMIN — BUSPIRONE HYDROCHLORIDE 15 MG: 5 TABLET ORAL at 21:14

## 2022-12-04 RX ADMIN — MEROPENEM 1 G: 1 INJECTION, POWDER, FOR SOLUTION INTRAVENOUS at 21:14

## 2022-12-04 RX ADMIN — POTASSIUM CHLORIDE 20 MEQ: 29.8 INJECTION INTRAVENOUS at 04:30

## 2022-12-04 RX ADMIN — BUSPIRONE HYDROCHLORIDE 15 MG: 5 TABLET ORAL at 14:28

## 2022-12-04 RX ADMIN — OXYCODONE 10 MG: 5 TABLET ORAL at 14:43

## 2022-12-04 RX ADMIN — ASPIRIN 81 MG CHEWABLE TABLET 81 MG: 81 TABLET CHEWABLE at 09:32

## 2022-12-04 RX ADMIN — Medication 3 UNITS: at 11:59

## 2022-12-04 RX ADMIN — CASTOR OIL AND BALSAM, PERU: 788; 87 OINTMENT TOPICAL at 09:33

## 2022-12-04 RX ADMIN — IPRATROPIUM BROMIDE AND ALBUTEROL SULFATE 3 ML: 2.5; .5 SOLUTION RESPIRATORY (INHALATION) at 08:13

## 2022-12-04 RX ADMIN — SODIUM CHLORIDE 40 MG: 9 INJECTION, SOLUTION INTRAMUSCULAR; INTRAVENOUS; SUBCUTANEOUS at 09:32

## 2022-12-04 RX ADMIN — SODIUM CHLORIDE, PRESERVATIVE FREE 10 ML: 5 INJECTION INTRAVENOUS at 21:14

## 2022-12-04 RX ADMIN — MEROPENEM 1 G: 1 INJECTION, POWDER, FOR SOLUTION INTRAVENOUS at 11:49

## 2022-12-04 RX ADMIN — HEPARIN SODIUM 5000 UNITS: 5000 INJECTION INTRAVENOUS; SUBCUTANEOUS at 16:11

## 2022-12-04 RX ADMIN — OXYCODONE 10 MG: 5 TABLET ORAL at 21:14

## 2022-12-04 RX ADMIN — LORAZEPAM 0.5 MG: 2 INJECTION INTRAMUSCULAR; INTRAVENOUS at 09:32

## 2022-12-04 RX ADMIN — SODIUM CHLORIDE, PRESERVATIVE FREE 10 ML: 5 INJECTION INTRAVENOUS at 05:51

## 2022-12-04 RX ADMIN — DEXTROSE MONOHYDRATE 250 ML: 100 INJECTION, SOLUTION INTRAVENOUS at 05:48

## 2022-12-04 RX ADMIN — METOCLOPRAMIDE 5 MG: 5 INJECTION, SOLUTION INTRAMUSCULAR; INTRAVENOUS at 06:02

## 2022-12-04 RX ADMIN — Medication 3 UNITS: at 17:50

## 2022-12-04 RX ADMIN — BUSPIRONE HYDROCHLORIDE 15 MG: 5 TABLET ORAL at 09:32

## 2022-12-04 RX ADMIN — SODIUM CHLORIDE, PRESERVATIVE FREE 10 ML: 5 INJECTION INTRAVENOUS at 14:28

## 2022-12-04 RX ADMIN — FUROSEMIDE 40 MG: 10 INJECTION, SOLUTION INTRAMUSCULAR; INTRAVENOUS at 06:00

## 2022-12-04 NOTE — PROGRESS NOTES
Critical Care Progress Note  India Obrien MD          Date of Service:  2022  NAME:  Renetta Bryant  :  1980  MRN:  [id number]      Subjective/Hospital course:      37 y/o male POD 1 from CABG x1 an AV valve replacement. He arrived intubated and on pressors. Extubated  on POD 0 @ 19:00. Noted to have SERGE with creatinine rise 2. Possibly secondary to hypotension in OR. Night of  was worked up for hypoxemia. This morning he tested positive for Covid. Case discussed with cT surgery. Most likely diagnosis is PE most likely secondary to hypercoaguable state from Covid. Pt had no respiratory symptoms preop or post op suggestive of URI      - Overnight developed hypoxemia. Pt had bronch and SYLWIA neither of which explained cause for hypoxemia. He required re-intubation and was put on veletri.  - intermittent desaturation events. CXR this a.m. with pulmonary edema   - fio2 improved   : fio2 and PEEP requirement has increased, still requiring epi at 2 mcg/min   : remains on epinephrine drip, sedated, fio2 requirement decreased to 80%'LFT's are trending   Down   : remains on epi drip, diueresed well with lasix drip,fio2 decreased to 50% and peep to 6  : Patient remains intubated and sedated. Being diuresed with lasix gtt. : Remains intubated, sedation is being weaned, able to follow commands. : Reports feeling better, extubated on . On high flow oxygen. 12/3: om mid flow at 6 L, bipap at night, c/o pain and want something for pain   : afebrile, but Tmax was 101 yesterday.  WBC trending down, on midflow at 5-6L/minute      Assessment/Plan:     PULMONARY:  Acute hypoxic resopiratory failure   Covid rapid +, PCR negative  D dimer / LE doppler negative - unlikely PE  Pulmonary edema/ pleural effusions  Ct chest : no PE  Plan  - - Continue nebulized bronchodilators and steroids  - Continue diuresis as tolerated. CARDIOVASCULAR/HEMODYNAMIC:  Off pressors. Plan    - ICU hemodynamic/cardiac monitoring  - MAP goal > 65 mmHg      RENAL:  SERGE : improving    Plan  - Monitor I/Os and UO  - Monitor renal function panel intermittently  - Correct electrolytes as needed  - Avoid nephrotoxic meds      GI/NUTRITION:  Continue reglan  Laxatives to move bowels  D/c NGT and re evaluate with speech     INFECTIOUS DISEASE  Covid ruled out with 1 negative PCR  Intermittent fevers      Micro: all cultures negative  Echo port removed yesterday    NEURO  No acute issues at this time    ENDO : TSH is elevated  Continue synthroid to 150 mcg once a day    Plan    - Daily SAT when meets ICU criteria  - ABCDEF mobility bundle  - PT/OT involvement when appropriate          Care Plan discussed with: attending, CTS NP and bedside nurse, GI , radiology    I personally spent 35 minutes of critical care time. This is time spent at this critically ill patient's bedside actively involved in patient care as well as the coordination of care and discussions with the patient's family. This does not include any procedural time which has been billed separately. Review of Systems:   ROS   +ve for pain    Vital Signs:   Patient Vitals for the past 4 hrs:   BP Temp Pulse Resp SpO2   12/04/22 0700 119/75 99.7 °F (37.6 °C) (!) 104 25 --   12/04/22 0600 136/60 99 °F (37.2 °C) 98 20 95 %   12/04/22 0500 (!) 131/52 99.5 °F (37.5 °C) 93 (!) 31 --   12/04/22 0400 (!) 132/54 99.9 °F (37.7 °C) 97 16 95 %          Intake/Output Summary (Last 24 hours) at 12/4/2022 0754  Last data filed at 12/4/2022 0700  Gross per 24 hour   Intake 2520 ml   Output 3306 ml   Net -786 ml          Physical Examination:    Young, well built  HEENT: normal  Heart: s1,s2  Lungs: clear  Abd: soft  Ext: no edema  Cns: AAO x4    Labs and Imaging:   Reviewed.       Medications:     Current Facility-Administered Medications   Medication Dose Route Frequency    morphine (10 mg/5 mL) 10 mg/5 mL oral solution 10 mg  10 mg Oral Q6H PRN    oxyCODONE IR (ROXICODONE) tablet 10 mg  10 mg Oral Q4H PRN    furosemide (LASIX) injection 40 mg  40 mg IntraVENous Q8H    LORazepam (ATIVAN) injection 0.5 mg  0.5 mg IntraVENous Q8H PRN    albuterol-ipratropium (DUO-NEB) 2.5 MG-0.5 MG/3 ML  3 mL Nebulization BID RT    scopolamine (TRANSDERM-SCOP) 1 mg over 3 days 1 Patch  1 Patch TransDERmal Q72H    insulin glargine (LANTUS) injection 70 Units  70 Units SubCUTAneous Q12H    insulin lispro (HUMALOG) injection   SubCUTAneous Q6H    glucose chewable tablet 16 g  4 Tablet Oral PRN    0.9% sodium chloride infusion 250 mL  250 mL IntraVENous PRN    bisacodyL (DULCOLAX) suppository 10 mg  10 mg Rectal DAILY    senna-docusate (PERICOLACE) 8.6-50 mg per tablet 2 Tablet  2 Tablet Oral DAILY    levothyroxine (SYNTHROID) tablet 150 mcg  150 mcg Oral 6am    balsam peru-castor oiL (VENELEX) ointment   Topical BID    heparin (porcine) injection 5,000 Units  5,000 Units SubCUTAneous Q8H    meropenem (MERREM) 1 g in 0.9% sodium chloride (MBP/ADV) 50 mL MBP  1 g IntraVENous Q8H    metoclopramide HCl (REGLAN) injection 5 mg  5 mg IntraVENous Q6H    pantoprazole (PROTONIX) 40 mg in 0.9% sodium chloride 10 mL injection  40 mg IntraVENous DAILY    0.9% sodium chloride infusion  3 mL/hr IntraVENous CONTINUOUS    sodium chloride (NS) flush 5-40 mL  5-40 mL IntraVENous Q8H    sodium chloride (NS) flush 5-40 mL  5-40 mL IntraVENous PRN    bacitracin 500 unit/gram packet 1 Packet  1 Packet Topical PRN    0.45% sodium chloride infusion  10 mL/hr IntraVENous CONTINUOUS    naloxone (NARCAN) injection 0.4 mg  0.4 mg IntraVENous PRN    ondansetron (ZOFRAN) injection 4 mg  4 mg IntraVENous Q4H PRN    albuterol (PROVENTIL VENTOLIN) nebulizer solution 2.5 mg  2.5 mg Nebulization Q4H PRN    aspirin chewable tablet 81 mg  81 mg Oral DAILY    calcium chloride 1 g in 0.9% sodium chloride 250 mL IVPB  1 g IntraVENous PRN    polyethylene glycol (MIRALAX) packet 17 g  17 g Oral DAILY    ELECTROLYTE REPLACEMENT NOTE: Nurse to review Serum Potassium and Magnesuim levels and Initiate Electrolyte Replacement Protocol as needed  1 Each Other PRN    magnesium sulfate 1 g/100 ml IVPB (premix or compounded)  1 g IntraVENous PRN    glucagon (GLUCAGEN) injection 1 mg  1 mg IntraMUSCular PRN    lactated ringers bolus infusion 250 mL  250 mL IntraVENous Q1H PRN    dextrose 10 % infusion 0-250 mL  0-250 mL IntraVENous PRN    melatonin tablet 3-6 mg  3-6 mg Oral QHS PRN    lidocaine 4 % patch 2 Patch  2 Patch TransDERmal Q24H    LORazepam (ATIVAN) tablet 1 mg  1 mg Oral Q8H PRN    sertraline (ZOLOFT) tablet 100 mg  100 mg Oral DAILY    busPIRone (BUSPAR) tablet 15 mg  15 mg Oral TID     ______________________________________________________________________  EXPECTED LENGTH OF STAY: 1d 19h  ACTUAL LENGTH OF STAY:          [address] [address], MD   Pulmonary/CCM  [address] [phone]

## 2022-12-04 NOTE — PROGRESS NOTES
Problem: Mobility Impaired (Adult and Pediatric)  Goal: *Acute Goals and Plan of Care (Insert Text)  Description: FUNCTIONAL STATUS PRIOR TO ADMISSION: Patient was independent and active without use of DME. Still driving. Denies home O2 use. States he is active at baseline, performing yard work and working part-time as a . HOME SUPPORT PRIOR TO ADMISSION: The patient lived with mother. Physical Therapy Goals  Revised 12/2/2022  1. Patient will move from supine to sit and sit to supine  in bed with supervision/set-up within 7 days. 2.  Patient will perform sit to/from stand with CGA within 7 days. 3.  Patient will ambulate 200 feet with least restrictive assistive device and contact guard assist within 7 days. 4.  Patient will ascend/descend 3 stairs with 1 handrail(s) with contact guard assist within 7 days. 5.  Patient will perform cardiac exercises per protocol with independence within 7 days. 6.  Patient will verbally recall and functionally demonstrate mindful-based movements (\"move in the tube\") principles without cues within 7 days. Physical Therapy Goals  Initiated 11/23/2022  1. Patient will move from supine to sit and sit to supine , scoot up and down, and roll side to side in bed with modified independence within 5 days. 2.  Patient will perform sit to/from stand with modified independence within 5 days. 3.  Patient will ambulate 300 feet with least restrictive assistive device and independence within 5 days. 4.  Patient will ascend/descend 3 stairs with 1 handrail(s) with modified independence within 5 days. 5.  Patient will perform cardiac exercises per protocol with independence within 5 days. 6.  Patient will verbally and functionally demonstrate 3/3 sternal precautions without cues within 5 days.   Outcome: Progressing Towards Goal    PHYSICAL THERAPY TREATMENT  Patient: Dorothea Schafer (5 y.o. male)  Date: 12/4/2022  Diagnosis: Acute non-Q wave non-ST elevation myocardial infarction (NSTEMI) ([facility] [address] [I21.4]  Aortic stenosis [I35.0]  CAD (coronary artery disease) [I25.10] <principal problem not specified>  Procedure(s) (LRB):  SYLWIA BY DR Rosa Isela Messer -  CORONARY ARTERY BYPASS GRAFT  X 1 WITH LEFT INTERNAL MAMMARY ARTERY GRAFTING - AORTIC VALVE REPLACEMENT WITH MEYER 25MM INSPIRIS RESILIA TISSUE VALVE AND REPAIR OF ASCENDING AORTIC ANEURYSM (N/A) 12 Days Post-Op  Precautions: Sternal (move in the tube)  Chart, physical therapy assessment, plan of care and goals were reviewed. ASSESSMENT  Patient continues with skilled PT services and is slowly progressing towards goals. Patient resting in bed upon arrival, agreeable to PT session though states \"I'm real agitated and feel terrible\". Patient on 6L HF NC through session. Continues to demonstrates limited use of UE's through appears improved this session compared to days prior. Patient requiring Min A for rolling and Mod A supine sit with minimal cuing to maintain precautions. Requiring CGA during sit<>stand with patient initiating correct position of hands for transfer. Continues to demonstrates general sway with initial stand though improved with time in standing. Ambuting x25ft with rollator for support, demonstrates significantly decreased gait speed and reports feeling like he ran a marathon at the end. Completed cardiac exercises in seated position, patient able to complete forward flexion and abduction in modified range without assist. Left pamphlet of exercises out for patient at he reports he has not been performing them, reminded him to complete 2x daily. Remained in chair at end of session with all needs in reach. Provided patient with bariatric waffle cushion.      Patient is verbalizing understanding of mindful-based movements (\"move in the tube\") principles of keeping UEs proximal to ribcage to prevent lateral pull on the sternum during load-bearing activities with visual and verbal cues required for compliance. Current Level of Function Impacting Discharge (mobility/balance): Min-Mod A bed mobility, CGA transfers, CGA ambulation x25ft with rollator         PLAN :  Patient continues to benefit from skilled intervention to address the above impairments. Continue treatment per established plan of care. to address goals. Recommendation for discharge: (in order for the patient to meet his/her long term goals)  Therapy 3 hours per day 5-7 days per week    This discharge recommendation:  Has been made in collaboration with the attending provider and/or case management    IF patient discharges home will need the following DME: to be determined (TBD)     SUBJECTIVE:   Patient stated I just ran a marathon.     OBJECTIVE DATA SUMMARY:   Patient mobilized on continuous portable monitor/telemetry.   Critical Behavior:  Neurologic State: Alert  Orientation Level: Oriented X4  Cognition: Follows commands  Safety/Judgement: Awareness of environment, Insight into deficits, Fall prevention    Functional Mobility Training:  Bed Mobility:  Rolling: Minimum assistance  Supine to Sit: Moderate assistance  Scooting: Supervision  Transfers:  Sit to Stand: Minimum assistance (using momentum)  Stand to Sit: Contact guard assistance  Balance:  Sitting: Intact  Sitting - Static: Good (unsupported)  Sitting - Dynamic: Good (unsupported)  Standing: Impaired  Standing - Static: Good;Constant support  Standing - Dynamic : Fair;Constant support    Ambulation/Gait Training:  Distance (ft): 25 Feet (ft)  Assistive Device: Walker, rollator  Ambulation - Level of Assistance: Contact guard assistance  Gait Abnormalities: Decreased step clearance  Base of Support: Widened  Speed/Juliet: Slow  Step Length: Left shortened;Right shortened    Cardiac diagnosis intervention:  Patient instructed and educated on mindful movement principles based on Move in The Tube concept to include maintaining bilateral elbows close to rib cage when performing any load-bearing activity such as getting in/out of bed, pushing up from a chair, opening a door, or lifting a box. Patient was given a handout with diagrams of each correct/incorrect method of performing each of the above tasks. Therapeutic Exercises:   Patient instructed on the benefits and demonstrated cardiac exercises while seated with Supervision. Instructed and indicated understanding on how to progress reps, sets against gravity, pacing through progressive muscle strengthening standing based on surgeon clearance for more weight in prep for basic and instrumental ADLs. Instruction on the use of household items in place of weights as needed.      CARDIAC  EXERCISE   Sets   Reps   Active Active Assist   Passive Self ROM   Comments   Shoulder flexion 1 5 [x]                                            []                                            []                                            []                                               Shoulder abduction 1      5 [x]                                            []                                            []                                            []                                               Scapular elevation 1 5 [x]                                            []                                            []                                            []                                               Scapular retraction 1 5 [x]                                            []                                            []                                            []                                               Trunk rotation 1 5 [x]                                            []                                            []                                            []                                               Trunk sidebending 1 5 [x]                                            []                                            [] []                                                  []                                            []                                            []                                            []                                                 Activity Tolerance:   Fair, requires rest breaks, and observed SOB with activity    After treatment patient left in no apparent distress:   Sitting in chair, Call bell within reach, and Caregiver / family present    COMMUNICATION/COLLABORATION:   The patients plan of care was discussed with: Registered nurse.      Regine Beckett, PT   Time Calculation: 31 mins

## 2022-12-04 NOTE — PROGRESS NOTES
1900: Bedside shift change report given to KG Parks (oncoming nurse) by Yomi Chou RN (offgoing nurse). Report included the following information SBAR, Kardex, Intake/Output, MAR, Recent Results, and Cardiac Rhythm ST .     2000: Assessment completed. See MAR and flowsheet for more details. 2100: Ambulated patient to chair with 2 assist and no issues. 0000: Reassessment completed. 0400: Reassessment completed. 7294: Labs discussed with NP Camille Houston. Potassium replacement ordered per replacement protocol. See MAR for details. 0600: Blood glucose 56. D10 infusion given per hypoglycemia protocol. See MAR for details. 0700: Bedside shift change report given to KG Sánchez and Sandra Zamorano RN (oncoming nurse) by KG Parks (offgoing nurse).  Report included the following information SBAR, Kardex, Intake/Output, MAR, Recent Results, and Cardiac Rhythm ST .

## 2022-12-04 NOTE — PROGRESS NOTES
1110 - Assumed care of patient from [facility]. Resting quietly in bed. No acute distress noted. 1200 - Assessment complete, see summary for details. No acute distress noted, concur with previous assessment. Re-positions self for comfort. 1443 - Given Roxicodone 10 mg PO for C/O generalized aching post PT/OT therapy. Remains up in chair, call bell within reach. Family at bedside. 1600 - Assessment complete, no  changes noted from previous assessment. 1800 - Assisted back to bed. Tolerated transfer with minimal assistance. Positioned self for comfort, call bell within reach. No acute distress noted. 1900 - Bedside verbal report given to oncoming shift.

## 2022-12-04 NOTE — PROGRESS NOTES
Physical Therapy:  Attempted to see patient for PT treatment at 10:15am. Patient requesting therapist to return around 1-2pm, stating \"I need some time to calm down. \" Therapist will return this afternoon, patient agreeable and eager to work at that time.       Regine Cordoba PT, DPT

## 2022-12-04 NOTE — PROGRESS NOTES
Patient refused to wear the hospital Bipap,because he doesn't like the mask and hasn't been sleeping well. Patient currently on 6L HFNC and SpO2 =96. Patient's mom will bring his home Cpap here.

## 2022-12-04 NOTE — PROGRESS NOTES
0700: Bedside and Verbal shift change report given to Gato Starr RN (oncoming nurse) by Rickey Mckee RN (offgoing nurse). Report included the following information SBAR, Kardex, Intake/Output, MAR, Recent Results, and Cardiac Rhythm nsr, sinus tach .       1030: pt assisted back to bed with two assist

## 2022-12-05 ENCOUNTER — APPOINTMENT (OUTPATIENT)
Dept: GENERAL RADIOLOGY | Age: 42
End: 2022-12-05
Attending: PHYSICIAN ASSISTANT
Payer: COMMERCIAL

## 2022-12-05 LAB
ALBUMIN SERPL-MCNC: 2.7 G/DL (ref 3.5–5)
ALBUMIN/GLOB SERPL: 0.8 {RATIO} (ref 1.1–2.2)
ALP SERPL-CCNC: 196 U/L (ref 45–117)
ALT SERPL-CCNC: 174 U/L (ref 12–78)
ANA SER QL: NEGATIVE
ANION GAP SERPL CALC-SCNC: 6 MMOL/L (ref 5–15)
AST SERPL-CCNC: 46 U/L (ref 15–37)
BASOPHILS # BLD: 0.1 K/UL (ref 0–0.1)
BASOPHILS NFR BLD: 1 % (ref 0–1)
BILIRUB DIRECT SERPL-MCNC: 0.3 MG/DL (ref 0–0.2)
BILIRUB SERPL-MCNC: 0.7 MG/DL (ref 0.2–1)
BUN SERPL-MCNC: 21 MG/DL (ref 6–20)
BUN/CREAT SERPL: 24 (ref 12–20)
CALCIUM SERPL-MCNC: 8.3 MG/DL (ref 8.5–10.1)
CHLORIDE SERPL-SCNC: 102 MMOL/L (ref 97–108)
CO2 SERPL-SCNC: 33 MMOL/L (ref 21–32)
CREAT SERPL-MCNC: 0.86 MG/DL (ref 0.7–1.3)
DIFFERENTIAL METHOD BLD: ABNORMAL
EOSINOPHIL # BLD: 0.7 K/UL (ref 0–0.4)
EOSINOPHIL NFR BLD: 7 % (ref 0–7)
ERYTHROCYTE [DISTWIDTH] IN BLOOD BY AUTOMATED COUNT: 15.6 % (ref 11.5–14.5)
GLOBULIN SER CALC-MCNC: 3.4 G/DL (ref 2–4)
GLUCOSE BLD STRIP.AUTO-MCNC: 114 MG/DL (ref 65–117)
GLUCOSE BLD STRIP.AUTO-MCNC: 174 MG/DL (ref 65–117)
GLUCOSE BLD STRIP.AUTO-MCNC: 238 MG/DL (ref 65–117)
GLUCOSE BLD STRIP.AUTO-MCNC: 249 MG/DL (ref 65–117)
GLUCOSE SERPL-MCNC: 118 MG/DL (ref 65–100)
HCT VFR BLD AUTO: 30.3 % (ref 36.6–50.3)
HGB BLD-MCNC: 9.4 G/DL (ref 12.1–17)
IMM GRANULOCYTES # BLD AUTO: 0.3 K/UL (ref 0–0.04)
IMM GRANULOCYTES NFR BLD AUTO: 3 % (ref 0–0.5)
LYMPHOCYTES # BLD: 2.2 K/UL (ref 0.8–3.5)
LYMPHOCYTES NFR BLD: 21 % (ref 12–49)
MAGNESIUM SERPL-MCNC: 2.1 MG/DL (ref 1.6–2.4)
MCH RBC QN AUTO: 26.6 PG (ref 26–34)
MCHC RBC AUTO-ENTMCNC: 31 G/DL (ref 30–36.5)
MCV RBC AUTO: 85.8 FL (ref 80–99)
MONOCYTES # BLD: 0.6 K/UL (ref 0–1)
MONOCYTES NFR BLD: 6 % (ref 5–13)
NEUTS SEG # BLD: 6.4 K/UL (ref 1.8–8)
NEUTS SEG NFR BLD: 62 % (ref 32–75)
NRBC # BLD: 0 K/UL (ref 0–0.01)
NRBC BLD-RTO: 0 PER 100 WBC
PLATELET # BLD AUTO: 242 K/UL (ref 150–400)
PMV BLD AUTO: 9.1 FL (ref 8.9–12.9)
POTASSIUM SERPL-SCNC: 3.7 MMOL/L (ref 3.5–5.1)
PROCALCITONIN SERPL-MCNC: 0.09 NG/ML
PROT SERPL-MCNC: 6.1 G/DL (ref 6.4–8.2)
RBC # BLD AUTO: 3.53 M/UL (ref 4.1–5.7)
RBC MORPH BLD: ABNORMAL
RBC MORPH BLD: ABNORMAL
SERVICE CMNT-IMP: ABNORMAL
SERVICE CMNT-IMP: NORMAL
SODIUM SERPL-SCNC: 141 MMOL/L (ref 136–145)
WBC # BLD AUTO: 10.3 K/UL (ref 4.1–11.1)

## 2022-12-05 PROCEDURE — 94640 AIRWAY INHALATION TREATMENT: CPT

## 2022-12-05 PROCEDURE — 74011000258 HC RX REV CODE- 258: Performed by: THORACIC SURGERY (CARDIOTHORACIC VASCULAR SURGERY)

## 2022-12-05 PROCEDURE — 74011636637 HC RX REV CODE- 636/637: Performed by: HOSPITALIST

## 2022-12-05 PROCEDURE — 74011636637 HC RX REV CODE- 636/637: Performed by: NURSE PRACTITIONER

## 2022-12-05 PROCEDURE — 99233 SBSQ HOSP IP/OBS HIGH 50: CPT | Performed by: INTERNAL MEDICINE

## 2022-12-05 PROCEDURE — 83735 ASSAY OF MAGNESIUM: CPT

## 2022-12-05 PROCEDURE — 36415 COLL VENOUS BLD VENIPUNCTURE: CPT

## 2022-12-05 PROCEDURE — 74011636637 HC RX REV CODE- 636/637: Performed by: THORACIC SURGERY (CARDIOTHORACIC VASCULAR SURGERY)

## 2022-12-05 PROCEDURE — 74011250637 HC RX REV CODE- 250/637: Performed by: NURSE PRACTITIONER

## 2022-12-05 PROCEDURE — 80048 BASIC METABOLIC PNL TOTAL CA: CPT

## 2022-12-05 PROCEDURE — 74011000250 HC RX REV CODE- 250: Performed by: ANESTHESIOLOGY

## 2022-12-05 PROCEDURE — 74011000258 HC RX REV CODE- 258: Performed by: INTERNAL MEDICINE

## 2022-12-05 PROCEDURE — 77010033711 HC HIGH FLOW OXYGEN

## 2022-12-05 PROCEDURE — 97116 GAIT TRAINING THERAPY: CPT

## 2022-12-05 PROCEDURE — 97535 SELF CARE MNGMENT TRAINING: CPT | Performed by: OCCUPATIONAL THERAPIST

## 2022-12-05 PROCEDURE — 85025 COMPLETE CBC W/AUTO DIFF WBC: CPT

## 2022-12-05 PROCEDURE — 74011250636 HC RX REV CODE- 250/636: Performed by: INTERNAL MEDICINE

## 2022-12-05 PROCEDURE — 71045 X-RAY EXAM CHEST 1 VIEW: CPT

## 2022-12-05 PROCEDURE — 74011636637 HC RX REV CODE- 636/637: Performed by: INTERNAL MEDICINE

## 2022-12-05 PROCEDURE — 74011250636 HC RX REV CODE- 250/636: Performed by: NURSE PRACTITIONER

## 2022-12-05 PROCEDURE — 74011000250 HC RX REV CODE- 250: Performed by: NURSE PRACTITIONER

## 2022-12-05 PROCEDURE — 97110 THERAPEUTIC EXERCISES: CPT

## 2022-12-05 PROCEDURE — 74011250637 HC RX REV CODE- 250/637: Performed by: PHYSICIAN ASSISTANT

## 2022-12-05 PROCEDURE — 92526 ORAL FUNCTION THERAPY: CPT | Performed by: SPEECH-LANGUAGE PATHOLOGIST

## 2022-12-05 PROCEDURE — 74011250637 HC RX REV CODE- 250/637: Performed by: INTERNAL MEDICINE

## 2022-12-05 PROCEDURE — 99232 SBSQ HOSP IP/OBS MODERATE 35: CPT | Performed by: CLINICAL NURSE SPECIALIST

## 2022-12-05 PROCEDURE — 84145 PROCALCITONIN (PCT): CPT

## 2022-12-05 PROCEDURE — 80076 HEPATIC FUNCTION PANEL: CPT

## 2022-12-05 PROCEDURE — 82962 GLUCOSE BLOOD TEST: CPT

## 2022-12-05 PROCEDURE — 74011000250 HC RX REV CODE- 250: Performed by: PHYSICIAN ASSISTANT

## 2022-12-05 PROCEDURE — 74011250637 HC RX REV CODE- 250/637: Performed by: THORACIC SURGERY (CARDIOTHORACIC VASCULAR SURGERY)

## 2022-12-05 PROCEDURE — 74011250636 HC RX REV CODE- 250/636: Performed by: THORACIC SURGERY (CARDIOTHORACIC VASCULAR SURGERY)

## 2022-12-05 PROCEDURE — 65270000046 HC RM TELEMETRY

## 2022-12-05 RX ORDER — SODIUM CHLORIDE 0.9 % (FLUSH) 0.9 %
5-40 SYRINGE (ML) INJECTION AS NEEDED
Status: DISCONTINUED | OUTPATIENT
Start: 2022-12-05 | End: 2022-12-09 | Stop reason: HOSPADM

## 2022-12-05 RX ORDER — ENOXAPARIN SODIUM 100 MG/ML
30 INJECTION SUBCUTANEOUS EVERY 12 HOURS
Status: DISCONTINUED | OUTPATIENT
Start: 2022-12-05 | End: 2022-12-09 | Stop reason: HOSPADM

## 2022-12-05 RX ORDER — METOPROLOL TARTRATE 25 MG/1
12.5 TABLET, FILM COATED ORAL EVERY 12 HOURS
Status: DISCONTINUED | OUTPATIENT
Start: 2022-12-05 | End: 2022-12-06

## 2022-12-05 RX ORDER — FUROSEMIDE 10 MG/ML
40 INJECTION INTRAMUSCULAR; INTRAVENOUS 2 TIMES DAILY
Status: DISCONTINUED | OUTPATIENT
Start: 2022-12-05 | End: 2022-12-07

## 2022-12-05 RX ORDER — SODIUM CHLORIDE 0.9 % (FLUSH) 0.9 %
5-40 SYRINGE (ML) INJECTION EVERY 8 HOURS
Status: DISCONTINUED | OUTPATIENT
Start: 2022-12-05 | End: 2022-12-09 | Stop reason: HOSPADM

## 2022-12-05 RX ORDER — FACIAL-BODY WIPES
10 EACH TOPICAL DAILY PRN
Status: DISCONTINUED | OUTPATIENT
Start: 2022-12-05 | End: 2022-12-09 | Stop reason: HOSPADM

## 2022-12-05 RX ORDER — POTASSIUM CHLORIDE 750 MG/1
40 TABLET, FILM COATED, EXTENDED RELEASE ORAL 2 TIMES DAILY
Status: DISCONTINUED | OUTPATIENT
Start: 2022-12-05 | End: 2022-12-06

## 2022-12-05 RX ORDER — INSULIN LISPRO 100 [IU]/ML
INJECTION, SOLUTION INTRAVENOUS; SUBCUTANEOUS
Status: DISCONTINUED | OUTPATIENT
Start: 2022-12-05 | End: 2022-12-08

## 2022-12-05 RX ORDER — AMOXICILLIN 250 MG
2 CAPSULE ORAL DAILY PRN
Status: DISCONTINUED | OUTPATIENT
Start: 2022-12-05 | End: 2022-12-09 | Stop reason: HOSPADM

## 2022-12-05 RX ORDER — PANTOPRAZOLE SODIUM 40 MG/1
40 TABLET, DELAYED RELEASE ORAL
Status: DISCONTINUED | OUTPATIENT
Start: 2022-12-05 | End: 2022-12-09 | Stop reason: HOSPADM

## 2022-12-05 RX ORDER — INSULIN GLARGINE 100 [IU]/ML
60 INJECTION, SOLUTION SUBCUTANEOUS EVERY 12 HOURS
Status: DISCONTINUED | OUTPATIENT
Start: 2022-12-05 | End: 2022-12-09 | Stop reason: HOSPADM

## 2022-12-05 RX ADMIN — INSULIN GLARGINE 60 UNITS: 100 INJECTION, SOLUTION SUBCUTANEOUS at 21:26

## 2022-12-05 RX ADMIN — POTASSIUM CHLORIDE 40 MEQ: 750 TABLET, FILM COATED, EXTENDED RELEASE ORAL at 09:23

## 2022-12-05 RX ADMIN — CASTOR OIL AND BALSAM, PERU: 788; 87 OINTMENT TOPICAL at 09:24

## 2022-12-05 RX ADMIN — Medication 4 UNITS: at 12:21

## 2022-12-05 RX ADMIN — BUSPIRONE HYDROCHLORIDE 15 MG: 5 TABLET ORAL at 20:25

## 2022-12-05 RX ADMIN — METOCLOPRAMIDE 5 MG: 5 INJECTION, SOLUTION INTRAMUSCULAR; INTRAVENOUS at 06:08

## 2022-12-05 RX ADMIN — BUSPIRONE HYDROCHLORIDE 15 MG: 5 TABLET ORAL at 09:23

## 2022-12-05 RX ADMIN — BUSPIRONE HYDROCHLORIDE 15 MG: 5 TABLET ORAL at 14:37

## 2022-12-05 RX ADMIN — INSULIN GLARGINE 60 UNITS: 100 INJECTION, SOLUTION SUBCUTANEOUS at 10:55

## 2022-12-05 RX ADMIN — SODIUM CHLORIDE, PRESERVATIVE FREE 10 ML: 5 INJECTION INTRAVENOUS at 14:30

## 2022-12-05 RX ADMIN — PANTOPRAZOLE SODIUM 40 MG: 40 TABLET, DELAYED RELEASE ORAL at 09:24

## 2022-12-05 RX ADMIN — FUROSEMIDE 40 MG: 10 INJECTION, SOLUTION INTRAMUSCULAR; INTRAVENOUS at 06:08

## 2022-12-05 RX ADMIN — ENOXAPARIN SODIUM 30 MG: 100 INJECTION SUBCUTANEOUS at 09:24

## 2022-12-05 RX ADMIN — MEROPENEM 1 G: 1 INJECTION, POWDER, FOR SOLUTION INTRAVENOUS at 20:10

## 2022-12-05 RX ADMIN — IPRATROPIUM BROMIDE AND ALBUTEROL SULFATE 3 ML: 2.5; .5 SOLUTION RESPIRATORY (INHALATION) at 19:41

## 2022-12-05 RX ADMIN — IPRATROPIUM BROMIDE AND ALBUTEROL SULFATE 3 ML: 2.5; .5 SOLUTION RESPIRATORY (INHALATION) at 07:32

## 2022-12-05 RX ADMIN — MEROPENEM 1 G: 1 INJECTION, POWDER, FOR SOLUTION INTRAVENOUS at 12:23

## 2022-12-05 RX ADMIN — FUROSEMIDE 40 MG: 10 INJECTION, SOLUTION INTRAMUSCULAR; INTRAVENOUS at 17:30

## 2022-12-05 RX ADMIN — INSULIN GLARGINE 70 UNITS: 100 INJECTION, SOLUTION SUBCUTANEOUS at 00:02

## 2022-12-05 RX ADMIN — ENOXAPARIN SODIUM 30 MG: 100 INJECTION SUBCUTANEOUS at 20:11

## 2022-12-05 RX ADMIN — ASPIRIN 81 MG CHEWABLE TABLET 81 MG: 81 TABLET CHEWABLE at 09:23

## 2022-12-05 RX ADMIN — Medication 3 UNITS: at 17:35

## 2022-12-05 RX ADMIN — SERTRALINE 100 MG: 50 TABLET, FILM COATED ORAL at 09:23

## 2022-12-05 RX ADMIN — SODIUM CHLORIDE, PRESERVATIVE FREE 10 ML: 5 INJECTION INTRAVENOUS at 21:29

## 2022-12-05 RX ADMIN — Medication 4 UNITS: at 00:02

## 2022-12-05 RX ADMIN — METOPROLOL TARTRATE 12.5 MG: 25 TABLET, FILM COATED ORAL at 09:26

## 2022-12-05 RX ADMIN — OXYCODONE 10 MG: 5 TABLET ORAL at 09:24

## 2022-12-05 RX ADMIN — METOPROLOL TARTRATE 12.5 MG: 25 TABLET, FILM COATED ORAL at 20:25

## 2022-12-05 RX ADMIN — LEVOTHYROXINE SODIUM 150 MCG: 0.07 TABLET ORAL at 06:08

## 2022-12-05 RX ADMIN — MEROPENEM 1 G: 1 INJECTION, POWDER, FOR SOLUTION INTRAVENOUS at 04:38

## 2022-12-05 RX ADMIN — SODIUM CHLORIDE, PRESERVATIVE FREE 10 ML: 5 INJECTION INTRAVENOUS at 06:08

## 2022-12-05 RX ADMIN — Medication 2 UNITS: at 21:26

## 2022-12-05 RX ADMIN — OXYCODONE 10 MG: 5 TABLET ORAL at 14:37

## 2022-12-05 RX ADMIN — METOCLOPRAMIDE 5 MG: 5 INJECTION, SOLUTION INTRAMUSCULAR; INTRAVENOUS at 00:03

## 2022-12-05 RX ADMIN — POTASSIUM CHLORIDE 40 MEQ: 750 TABLET, FILM COATED, EXTENDED RELEASE ORAL at 17:30

## 2022-12-05 RX ADMIN — HEPARIN SODIUM 5000 UNITS: 5000 INJECTION INTRAVENOUS; SUBCUTANEOUS at 00:03

## 2022-12-05 RX ADMIN — LORAZEPAM 0.5 MG: 2 INJECTION INTRAMUSCULAR; INTRAVENOUS at 20:08

## 2022-12-05 NOTE — PROGRESS NOTES
[facility] ICU Progress Note    Admit Date: 11/15/2022  POD:  13 Day Post-Op    Procedure:  Procedure(s):  SYLWIA BY DR Keren Kumar -  CORONARY ARTERY BYPASS GRAFT  X 1 WITH LEFT INTERNAL MAMMARY ARTERY GRAFTING - AORTIC VALVE REPLACEMENT WITH MEYER 25MM INSPIRIS RESILIA TISSUE VALVE AND REPAIR OF ASCENDING AORTIC ANEURYSM        Subjective/Interval:   Pt seen with Dr. Hanna Amador. Looks great! Pt feeling well. Reports he doesn't remember anything, updated on events. Pt in understanding and aware of what is needed from him. Sternal precautions also reviewed. On 5L NC. Afebrile. No gtts. Agitation over the weekend, very calm with me this am, appropriate. Not wearing CPAP at night, tolerating NC well. Objective:   Vitals:  Blood pressure (!) 156/73, pulse (!) 102, temperature 98.9 °F (37.2 °C), resp. rate 22, height 5' 9\" (1.753 m), weight 252 lb 13.9 oz (114.7 kg), SpO2 93 %. Temp (24hrs), Av.7 °F (37.1 °C), Min:98.3 °F (36.8 °C), Max:98.9 °F (37.2 °C)    EKG/Rhythm:  NSR-ST 90-100s    Oxygen Therapy:  Oxygen Therapy  O2 Sat (%): 93 % (22 0732)  Pulse via Oximetry: 91 beats per minute (22 0732)  O2 Device: Hi flow nasal cannula (22 0732)  Skin Assessment: Clean, dry, & intact (22 1200)  Skin Protection for O2 Device: N/A (22 1200)  Orientation: Anterior;Bilateral (22 0800)  Location: Cheek (22)  Interventions: Skin Barrier (22 1800)  O2 Flow Rate (L/min): 6 l/min (22 0732)  O2 Temperature: 87.8 °F (31 °C) (22 0625)  FIO2 (%): (S) 35 % (22 0407)    CXR  CXR Results  (Last 48 hours)                 22 0504  XR CHEST PORT Final result    Impression:  impression: No acute changes. Narrative:  EXAM:  XR CHEST PORT       INDICATION: Postop       COMPARISON:        TECHNIQUE: portable chest AP view       FINDINGS: Stable cardiac silhouette, prior sternotomy. No acute infiltrate or   shift. Removal of central line.            22 0432  XR CHEST PORT Final result    Impression:  Removal of the NG tube. The lungs are clear. Narrative:  INDICATION:    post op        EXAMINATION:  AP CHEST, PORTABLE       COMPARISON: 12/3/2022       FINDINGS: Single AP portable view of the chest at 418 hours demonstrates   interval removal of the NG tube. A right IJ sheath remains. There is no   pneumothorax or the lungs are clear. The cardiomediastinal silhouette is stable. There is no new airspace disease. Admission Weight: Last Weight   Weight: 257 lb 15 oz (117 kg) Weight: 252 lb 13.9 oz (114.7 kg)     Intake / Output / Drain:  Current Shift: No intake/output data recorded. Last 24 hrs.:   Intake/Output Summary (Last 24 hours) at 12/5/2022 0904  Last data filed at 12/5/2022 0600  Gross per 24 hour   Intake 940 ml   Output 2130 ml   Net -1190 ml     EXAM:  General:  Alert, appropriate, NAD. Lungs:    Diminished to auscultation bilaterally   Incision:  Incision clean, dry and intact and prineo intact   Heart:   Regular rate and rhythm  and 1/6 systolic murmur, rubs or gallops   Abdomen:    Distended, soft, hypoactive bowel sounds, and obese. Extremities:  Trace edema BLE, BUE   Neurologic:  Non-focal.     Labs:   Recent Labs     12/05/22  0628 12/05/22  0624 12/04/22  0544 12/04/22  0303   WBC 10.3  --   --  12.5*   HGB 9.4*  --   --  9.2*   HCT 30.3*  --   --  30.6*     --   --  260     --   --  148*   K 3.7  --   --  3.6   BUN 21*  --   --  32*   CREA 0.86  --   --  0.98   *  --   --  84   GLUCPOC  --  114   < >  --    INR  --   --   --  1.2*    < > = values in this interval not displayed. Assessment:     Active Problems:    Acute non-Q wave non-ST elevation myocardial infarction (NSTEMI) ([facility] [address] (11/15/2022)      Aortic stenosis (11/22/2022)      CAD (coronary artery disease) (11/22/2022)      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)      Overview:  On pump CORONARY ARTERY BYPASS GRAFT  X 1 WITH LEFT INTERNAL MAMMARY ARTERY to LAD      AORTIC VALVE REPLACEMENT WITH MEYER 25MM INSPIRIS RESILIA TISSUE VALVE       REPAIR OF ASCENDING AORTIC ANEURYSM with 26mm hemashield graft      S/P AVR (aortic valve replacement) and aortoplasty (11/22/2022)      Overview: On pump CORONARY ARTERY BYPASS GRAFT  X 1 WITH LEFT INTERNAL MAMMARY       ARTERY to LAD      AORTIC VALVE REPLACEMENT WITH MEYER 25MM INSPIRIS RESILIA TISSUE VALVE       REPAIR OF ASCENDING AORTIC ANEURYSM with 26mm hemashield graft       Plan/Recommendations/Medical Decision Making:     Severe symptomatic AS, s/p tissue AVR. Continue ASA. CAD, STEMI, s/p CABG. Continue ASA. Holding amio, statin for transaminitis. Repeat Echo without effusion or tamponade, NL LV/RVF. Acute hypoxic respiratory failure:Extubated, cont to wean O2 as able. Cont diuresis, hourly IS. Nebs BID and PRN. Flutter valve. On 5L NC  SERGE: Crt up to 2.01. Nephrology consult appreciated, avoid nephrotoxins, trend. Crt down to 0.86 this am. Change Lasix 40 mg IV BID   Acute blood loss anemia: improving, H&H 9.4/30.3 this am  Transaminitis. Acute Severe Hepatitis, GI consult appreciated, + hepatomegaly and splenomegaly. Viral panel neg. Triple phase liver scan completed 11/30. No hepatic of splenic ischemia noted. + hepatic steatosis. Labs cont to trend down. Leukocytosis: BC, Sputum and urine cultures NGTD. ID following, Abx changed to Meropenem only. WBC up to 22.6, down to 10.3 today, Afebrile. Procal cont to trend down, 0.09 this am  HTN: On ACEi, BB PTA; resume BB when appropriate. HLD: holding statin. WALLY , not on CPAP. Was unable to tolerate due to anxiety; he has been working on this with Sleep Medicine. OP follow-up. refusing our CPAP, will have his approved for use  DMII. On Toujeo at home. Repeat A1C 9.0. Diabetes management following. Cont lantus   GERD. Continue PPI. Anxiety and depression. On Buspar, Zoloft; continue. Supportive care. Ileus: + BMs. Reduce bowel regimen. Nutrition: Advance diet as tolerated, plan to be seen by speech again today. Deconditioning/mobility: OOB TID, ambulating BID. Arm exercises. PT stating IPR over the weekend, will reassess this am. Reports were pt is getting up to chair well with min assist     DVT ppx- Heparin subcu  Dispo- PT/OT. OOB TID, Ambulating BID. D/c Strong/ likely transfer to stepdown this afternoon.  Case management following to aid in d/c planning, d/c TBD      Signed By: Deborah Flanagan NP

## 2022-12-05 NOTE — PROGRESS NOTES
Patient's home CPAP wasn't check by [employer]. Patient said he'll use it tomorrow and will stay with 6L HFNC for tonight.  SpO2 = 95

## 2022-12-05 NOTE — PROGRESS NOTES
Comprehensive Nutrition Assessment    Type and Reason for Visit: Reassess    Nutrition Recommendations/Plan:   Continue diet per SLP   RD to add Ensure High Protein TID  Please document % meals and supplements consumed in flowsheet I/O's under intake      Malnutrition Assessment:  Malnutrition Status: Moderate malnutrition (12/01/22 1243)    Context:  Acute illness     Findings of the 6 clinical characteristics of malnutrition:   Energy Intake:  50% or less of est energy requirements for 5 or more days  Weight Loss:  Unable to assess     Body Fat Loss:  Unable to assess,     Muscle Mass Loss:  Unable to assess,    Fluid Accumulation:  Mild, Extremities   Strength:  Not performed     Nutrition Assessment:  Chart reviewed, case discussed during CCU rounds. Pt diet upgraded earlier today by SLP. He did quite well with breakfast tray with RN. Appetite appears to have been quite poor over the weekend. Curtain pulled and pt receiving care at time of attempted visit. -220, diabetes team continues to follow and make insulin recommendations. Will add PO supplements TID to better meet protein needs, will go with lower kcal option for now as PO intake seems to be picking up today. Last BM noted a couple of days ago. Patient Vitals for the past 168 hrs:   % Diet Eaten   12/04/22 1800 26 - 50%   12/04/22 1237 26 - 50%   12/04/22 0813 26 - 50%   12/03/22 1827 26 - 50%   12/03/22 1400 26 - 50%            Nutrition Related Findings:    Meds: merrem, lasix, lantus, humalog, protonix, miralax, KCl. Edema: trace-generalized, +2-all extremities. BM 12/3 Wound Type: Surgical incision    Current Nutrition Intake & Therapies:  Average Meal Intake: %  Average Supplement Intake: None ordered  DIET ONE TIME MESSAGE  ADULT DIET Easy to Chew; 4 carb choices (60 gm/meal)  ADULT ORAL NUTRITION SUPPLEMENT Breakfast, Lunch, Dinner;  Low Calorie/High Protein    Anthropometric Measures:  Height: 5' 9\" (175.3 cm)  Ideal Body Weight (IBW): 160 lbs (73 kg)     Current Body Wt:  114.7 kg (252 lb 13.9 oz), 158 % IBW. Bed scale  Current BMI (kg/m2): 37.3        Weight Adjustment: No adjustment                 BMI Category: Obese class 2 (BMI 35.0-39. 9)    Estimated Daily Nutrient Needs:  Energy Requirements Based On: Formula  Weight Used for Energy Requirements: Current  Energy (kcal/day): MSJ 2236 (BMR x1.1 AF)  Weight Used for Protein Requirements: Ideal  Protein (g/day): 110 g (1.5 g/kg IBW)  Method Used for Fluid Requirements: 1 ml/kcal  Fluid (ml/day): 1900 mL or per MD    Nutrition Diagnosis:   Inadequate protein-energy intake related to impaired respiratory function, altered GI function as evidenced by NPO or clear liquid status due to medical condition  Previous dx resolving.      Nutrition Interventions:   Food and/or Nutrient Delivery: Continue current diet, Start oral nutrition supplement  Nutrition Education/Counseling: No recommendations at this time  Coordination of Nutrition Care: Continue to monitor while inpatient, Interdisciplinary rounds       Goals:  Previous Goal Met: Progressing toward goal(s)  Goals: PO intake 75% or greater, by next RD assessment       Nutrition Monitoring and Evaluation:   Behavioral-Environmental Outcomes: None identified  Food/Nutrient Intake Outcomes: Food and nutrient intake, Supplement intake  Physical Signs/Symptoms Outcomes: Biochemical data, Nutrition focused physical findings, Skin, Weight, GI status, Fluid status or edema    Discharge Planning:    Continue oral nutrition supplement, Continue current diet    Julianna Bobo RD, CNSC  Contact: ext [phone]

## 2022-12-05 NOTE — DIABETES MGMT
[address] [facility]    CLINICAL NURSE SPECIALIST CONSULT     Initial Presentation   Frida Beard is a 36 y.o. male admitted 11/15/22 after experiencing chest pain. Afebrile. Hypertensive. 02 sats 100%  LAB: WBC 8.7. Normal H&H. /AG 3. Normal kidney & liver parameters. NT pro-; troponin 714  CXR:  Mild interstitial pulmonary edema versus interstitial pneumonia. No   pneumothorax. Consider PA and lateral chest views when the patient can better   tolerate. HX:   Past Medical History:   Diagnosis Date    Anxiety and depression     anxiety, depression    Bleeding of eye, left     8/17/21 pt reports receiving injections in right eye for beeding    Chronic headaches 2007    COVID-19 12/20/2020    Diabetes type 1, uncontrolled     since 8years old    GERD (gastroesophageal reflux disease)     Hypercholesterolemia     Hypertension     Hypothyroidism     MI (myocardial infarction) ([facility] [address]     as of 8/17/21 pt reports 9 stents total    WALLY on CPAP       INITIAL DX:   Acute non-Q wave non-ST elevation myocardial infarction (NSTEMI) ([facility] [address] [I21.4]  Aortic stenosis [I35.0]  CAD (coronary artery disease) [I25.10]     Current Treatment     TX: 11/22/22 On pump CORONARY ARTERY BYPASS GRAFT  X 1 WITH LEFT INTERNAL MAMMARY ARTERY to LAD  AORTIC VALVE REPLACEMENT WITH MEYER 25MM INSPIRIS RESILIA TISSUE VALVE   REPAIR OF ASCENDING AORTIC ANEURYSM with 26mm hemashield graft    Consulted by Provider for advanced diabetes nursing assessment and care for:   [x] Transitioning off Linell Yeung   [x] Inpatient management strategy  [] Home management assessment  [] Survival skill education    Hospital Course   Clinical progress has been complicated by need for ICU level of care. 11/24/22 Overnight developed hypoxemia. Underwent bronch and SYLWIA, neither of which explained cause for hypoxemia. He required re-intubation and was put on veletri. 11/25/22 Intermittent desaturation events. CXR this a.m. with pulmonary edema  11/27/22 Concern for hepatic ischemia/infarction with progressive trnasaminitis. GI consulted determines severe hepatitis  11/29/22 Sedated on Precedex & Propofol. On C vent support Fi02 50%/Peep 8. Trying to transition off epi to baldev infusions. Will restart Tfs. NG 2 suction at the moment. Plan on diuresing  11/30/22 Follows commands with cough & gag+. On AC vent support Fi02 50%/Peep 8. On Fentanyl infusion; Propofol being weaned. BP managed with epi & baldev infusions. NG to suction & drawing 500cc over past day. KKUB revealed prominent fecal status. Receiving Miralax & Senna; no BM for 14 days. 12/1/22 Anxious. BP & HR up. Plan to extubate this morning. On Spon vent support Fi02 40%/Peep 5. Lots of oral secretions. Remains on baldev & epi infusions fo BP management. NG clamped. Will receive enema today for fecal stasis. 12/2/22 Alert & oriented. Precedex no longer needed for anxiety; will be stopped. O2 via MF. NG remains in place. Plans for Speech evaluation to move to oral feeding. In the meantime, a Dobhoff may be indicated with initial feedings at trickle rate. PT will be started as well. 12/5/22 Alert & oriented. Afebrile. On MF 02; no CPAP overnight. CXR:   Stable cardiac silhouette, prior sternotomy. No acute infiltrate or   shift. Removal of central line. Eating first true meal this morning; ate everything. OOB with assistance & less dizziness per nursing. Moving bowels per patient.     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 Max daily dose of 150 units. Diabetes self-management practices:   Eating pattern - Eats 3 meals on most days and snacks in the evening   [x] Not eating a carbohydrate-controlled mealplan  Physical activity pattern   [x] Not employing a physical activity program to control BG  Monitoring pattern   [x] Testing BGs   [x] Breakfast 300s  [x] Lunch  100-200s  [x] Dinner  100-200s  Taking medications pattern  [x] Consistent administration  [x] Affordable  Overall evaluation:    [x] Not achieving A1c target with drug therapy & self-care practices    Subjective   \"This is my first real meal.\"     Objective   Physical exam  General Obese male in no acute distress  Neuro  Alert & oriented  Vital Signs Visit Vitals  BP 98/61   Pulse (!) 108   Temp (P) 98.5 °F (36.9 °C)   Resp 22   Ht 5' 9\" (1.753 m)   Wt 114.7 kg (252 lb 13.9 oz)   SpO2 93%   BMI 37.34 kg/m²     Laboratory  Recent Labs     12/05/22  0628 12/04/22  0303 12/03/22  0355   * 84 187*   AGAP 6 6 8   WBC 10.3 12.5* 16.3*   CREA 0.86 0.98 1.22   AST 46* 80* 82*   * 248* 298*     Factors impacting BG management  Factor Dose Comments   Nutrition:  Easy to Chew   60 grams   Fist true meal today per patient   Drugs:  Atypical antipsychotics   Buspar 3XB. Zoloft D      Pain Oxycodone PRN    Infection Merrem Q8 hrs (last doses today) Afebrile.  WBC normalized   Other:   Kidney function  Liver function   SERGE resolved  AST/ALT elevated but trending down     Acute Severe Hepatitis per GI     Blood glucose pattern      Significant diabetes-related events over the past 24-72 hours  11/15/22 Admission   Total insulin requirements without achieving target Bgs until 11/20/22:    11/21/22 Started on Arliss Persons  11/23/22 Using 5-10 units/hr of insulin this morning  11/28/22 Using 2-6 units/hr of insulin  11/29/22 Continues to use 3-6 units/hr of insulin  11/30/22 Using 3.5 units/hr => transitioned off GS  12/1/22 Bgs rising into 200-300s  12/2/22 Change in basal insulin dosing has brought Bgs to goal  12/4/22 Hypoglycemia in the AM    Assessment and Plan   Nursing Diagnosis Risk for unstable blood glucose pattern   Nursing Intervention Domain 5252 Decision-making Support   Nursing Interventions Examined current inpatient diabetes/blood glucose control   Explored factors facilitating and impeding inpatient management  Explored corrective strategies with patient and responsible inpatient provider   Informed patient of rational for insulin strategy while hospitalized     Evaluation   This 36year old  male with known CAD was admitted with chest pain; he underwent CABG & AVR. Patient has known Type 1 diabetes with poor control going into this surgery. Patient required 260 units/D prior to the surgery (during this admission) without consistently achieving BG targets. Had been on Arliss Persons since surgery 11/22/22; also required vent & pressor support. Abdominal distention related to feces finally resolved with bowel regime. Extubated to MidFlow 02. Not eating well over the weekend; first true meal this AM.    It is clear his basal insulin needs are less than prior to admission; will reduce basal dose & add mealtime once he is eating >50% of his meals consistently.     Recommendations     [x] Reduce Lantus insulin to 60 units twice daily    [x] When he is eating >50% of his meals, give 20 units of Humalog insulin at meals    Billing Code(s)   [x] 08482 IP subsequent hospital care - 25 minutes     Before making these care recommendations, I personally reviewed the hospitalization record, including notes, laboratory & diagnostic data and current medications, and examined the patient at the bedside (circumstances permitting) before making care recommendations. More than fifty (50) percent of the time was spent in patient counseling and/or care coordination.   Total minutes: 40 [address] Je[address] Lander, CNS  Diabetes Clinical Nurse Specialist  Program for Diabetes [facility]  Access via [facility]

## 2022-12-05 NOTE — PROGRESS NOTES
Problem: Mobility Impaired (Adult and Pediatric)  Goal: *Acute Goals and Plan of Care (Insert Text)  Description: FUNCTIONAL STATUS PRIOR TO ADMISSION: Patient was independent and active without use of DME. Still driving. Denies home O2 use. States he is active at baseline, performing yard work and working part-time as a . HOME SUPPORT PRIOR TO ADMISSION: The patient lived with mother. Physical Therapy Goals  Revised 12/2/2022  1. Patient will move from supine to sit and sit to supine  in bed with supervision/set-up within 7 days. 2.  Patient will perform sit to/from stand with CGA within 7 days. 3.  Patient will ambulate 200 feet with least restrictive assistive device and contact guard assist within 7 days. 4.  Patient will ascend/descend 3 stairs with 1 handrail(s) with contact guard assist within 7 days. 5.  Patient will perform cardiac exercises per protocol with independence within 7 days. 6.  Patient will verbally recall and functionally demonstrate mindful-based movements (\"move in the tube\") principles without cues within 7 days. Physical Therapy Goals  Initiated 11/23/2022  1. Patient will move from supine to sit and sit to supine , scoot up and down, and roll side to side in bed with modified independence within 5 days. 2.  Patient will perform sit to/from stand with modified independence within 5 days. 3.  Patient will ambulate 300 feet with least restrictive assistive device and independence within 5 days. 4.  Patient will ascend/descend 3 stairs with 1 handrail(s) with modified independence within 5 days. 5.  Patient will perform cardiac exercises per protocol with independence within 5 days. 6.  Patient will verbally and functionally demonstrate 3/3 sternal precautions without cues within 5 days.          Outcome: Progressing Towards Goal   PHYSICAL THERAPY TREATMENT  Patient: Tiny Solares (69 y.o. male)  Date: 12/5/2022  Diagnosis: Acute non-Q wave non-ST elevation myocardial infarction (NSTEMI) ([facility]) [I21.4]  Aortic stenosis [I35.0]  CAD (coronary artery disease) [I25.10] <principal problem not specified>  Procedure(s) (LRB):  SYLWIA BY DR Catrina Flowers -  CORONARY ARTERY BYPASS GRAFT  X 1 WITH LEFT INTERNAL MAMMARY ARTERY GRAFTING - AORTIC VALVE REPLACEMENT WITH MEYER 25MM INSPIRIS RESILIA TISSUE VALVE AND REPAIR OF ASCENDING AORTIC ANEURYSM (N/A) 13 Days Post-Op  Precautions: Sternal (move in the tube)  Chart, physical therapy assessment, plan of care and goals were reviewed. ASSESSMENT  Patient continues with skilled PT services and is slowly progressing towards goals however continues to require increased verbal cueing for adherence to sternal precautions as well as increased encouragement for progression of activity/mobility and active participation in therapy. Pt self-limiting and fearful of eliciting increased pain levels, exhibiting significantly decreased AROM of BUEs (especially during cardiac exercises). Pt required CG/minAx1 as well as facilitation of forward weight shift during sit>>stand transfer. Once standing, pt able to progress ambulation trial to a distance of 90ft w/ rollator walker and CGAx1. Gait speed decreased to non functional pace and was noted to decrease in speed further as pt fatigued. Following brief standing rest break, progressed pt to short distance ambulation without rollator walker with pt again requiring CGAx1. No overt LOB throughout however fair gait stability overall. All mobility occurred on 6L O2 with VSS throughout. Pt continues to function well below his baseline independent level and is most appropriate for additional rehab at discharge.      Patient is verbalizing and is not demonstrating understanding of mindful-based movements (\"move in the tube\") principles of keeping UEs proximal to ribcage to prevent lateral pull on the sternum during load-bearing activities with verbal and manual cues required for compliance. Current Level of Function Impacting Discharge (mobility/balance): CG/Marilyn sit<>stand, CGA during ambulation with and without rollator walker support    Other factors to consider for discharge: self-limiting, impaired activity tolerance, independent prior to surgery         PLAN :  Patient continues to benefit from skilled intervention to address the above impairments. Continue treatment per established plan of care. to address goals. Recommendation for discharge: (in order for the patient to meet his/her long term goals)  Therapy 3 hours per day 5-7 days per week    This discharge recommendation:  Has been made in collaboration with the attending provider and/or case management    IF patient discharges home will need the following DME: to be determined (TBD)       SUBJECTIVE:   Patient stated I've never been through something like this before.     OBJECTIVE DATA SUMMARY:   Patient mobilized on continuous portable monitor/telemetry.   Critical Behavior:  Neurologic State: Alert  Orientation Level: Oriented X4  Cognition: Follows commands  Safety/Judgement: Awareness of environment, Insight into deficits, Fall prevention    Functional Mobility Training:  Bed Mobility:      Seated in recliner upon arrival                Transfers:  Sit to Stand: Minimum assistance;Contact guard assistance (initially Marilyn, progressing to CGA)  Stand to Sit: Contact guard assistance                               Balance:  Sitting: Intact  Standing: Impaired  Standing - Static: Good  Standing - Dynamic : Good;Fair    Ambulation/Gait Training:  Distance (ft): 90 Feet (ft)  Assistive Device: Walker, rolling;Gait belt  Ambulation - Level of Assistance: Contact guard assistance        Gait Abnormalities: Decreased step clearance        Base of Support: Widened     Speed/Juliet: Slow  Step Length: Left shortened;Right shortened                   Cardiac diagnosis intervention:  Patient instructed and educated on mindful movement principles based on Move in The Tube concept to include maintaining bilateral elbows close to rib cage when performing any load-bearing activity such as getting in/out of bed, pushing up from a chair, opening a door, or lifting a box. Patient was given a handout with diagrams of each correct/incorrect method of performing each of the above tasks. Therapeutic Exercises:   Patient instructed on the benefits and demonstrated cardiac exercises while seated in recliner chair with Minimum assistance. Instructed and indicated understanding on how to progress reps, sets against gravity, pacing through progressive muscle strengthening standing based on surgeon clearance for more weight in prep for functional activity. Instruction on the use of household items in place of weights as needed.      CARDIAC  EXERCISE   Sets   Reps   Active Active Assist   Passive Self ROM   Comments   Shoulder flexion 1 10 []                                            [x]                                            []                                            []                                               Shoulder abduction 1     10 []                                            [x]                                            []                                            []                                               Scapular elevation 1 10 [x]                                            []                                            []                                            []                                               Scapular retraction 1 10 [x]                                            []                                            []                                            []                                               Trunk rotation 1 10 [x]                                            []                                            []                                            [x] Trunk sidebending 1 10 [x]                                            []                                            []                                            []                                                  []                                            []                                            []                                            []                                                   Pain Rating:  Reported pain in sternum however did not quantify    Activity Tolerance:   VSS on 6L     After treatment patient left in no apparent distress:   Sitting in chair and Call bell within reach    COMMUNICATION/COLLABORATION:   The patients plan of care was discussed with: Occupational therapist and Registered nurse.      Doc Godoy, PT, DPT   Time Calculation: 33 mins

## 2022-12-05 NOTE — PROGRESS NOTES
Critical Care Progress Note  Laurie Gifford MD          Date of Service:  2022  NAME:  Darshana Cardenas  :  1980  MRN:  [id number]      Subjective/Hospital course:      35 y/o male POD 1 from CABG x1 an AV valve replacement. He arrived intubated and on pressors. Extubated  on POD 0 @ 19:00. Noted to have SERGE with creatinine rise 2. Possibly secondary to hypotension in OR. Night of  was worked up for hypoxemia. This morning he tested positive for Covid. Case discussed with cT surgery. Most likely diagnosis is PE most likely secondary to hypercoaguable state from Covid. Pt had no respiratory symptoms preop or post op suggestive of URI      - Overnight developed hypoxemia. Pt had bronch and SYLWIA neither of which explained cause for hypoxemia. He required re-intubation and was put on veletri.  - intermittent desaturation events. CXR this a.m. with pulmonary edema   - fio2 improved   : fio2 and PEEP requirement has increased, still requiring epi at 2 mcg/min   : remains on epinephrine drip, sedated, fio2 requirement decreased to 80%'LFT's are trending   Down   : remains on epi drip, diueresed well with lasix drip,fio2 decreased to 50% and peep to 6  : Patient remains intubated and sedated. Being diuresed with lasix gtt. : Remains intubated, sedation is being weaned, able to follow commands. : Reports feeling better, extubated on . On high flow oxygen. 12/3: om mid flow at 6 L, bipap at night, c/o pain and want something for pain   : afebrile, but Tmax was 101 yesterday. WBC trending down, on midflow at 5-6L/minute   : did not use his cpap last night.  afebrile      Assessment/Plan:     PULMONARY:  Acute hypoxic resopiratory failure   Covid rapid +, PCR negative  D dimer / LE doppler negative - unlikely PE  Pulmonary edema/ pleural effusions  Ct chest : no PE  Plan  - - Continue nebulized bronchodilators and steroids  - Continue diuresis as tolerated. Cpap at night  Might need home o2    CARDIOVASCULAR/HEMODYNAMIC:  Off pressors. Plan    - ICU hemodynamic/cardiac monitoring  - MAP goal > 65 mmHg      RENAL:  SERGE : improving    Plan  - Monitor I/Os and UO  - Monitor renal function panel intermittently  - Correct electrolytes as needed  - Avoid nephrotoxic meds      GI/NUTRITION:  Continue reglan  Laxatives to move bowels  Continue cardiac diet    INFECTIOUS DISEASE  Covid ruled out with 1 negative PCR  Intermittent fevers      Micro: all cultures negative  Loring port removed yesterday    NEURO  No acute issues at this time    ENDO : TSH is elevated  Continue synthroid to 150 mcg once a day    Plan    - Daily SAT when meets ICU criteria  - ABCDEF mobility bundle  - PT/OT involvement when appropriate          Care Plan discussed with: attending, CTS NP and bedside nurse, GI , radiology    I personally spent 00 minutes of critical care time. This is time spent at this critically ill patient's bedside actively involved in patient care as well as the coordination of care and discussions with the patient's family. This does not include any procedural time which has been billed separately. Review of Systems:   ROS   +ve for pain    Vital Signs:   Patient Vitals for the past 4 hrs:   BP Pulse Resp SpO2   12/05/22 0926 98/61 (!) 108 -- --   12/05/22 0732 -- -- -- 93 %   12/05/22 0600 (!) 156/73 (!) 102 22 95 %          Intake/Output Summary (Last 24 hours) at 12/5/2022 0930  Last data filed at 12/5/2022 0600  Gross per 24 hour   Intake 940 ml   Output 2130 ml   Net -1190 ml          Physical Examination:    Young, well built  HEENT: normal  Heart: s1,s2  Lungs: clear  Abd: soft  Ext: no edema  Cns: AAO x4    Labs and Imaging:   Reviewed.       Medications:     Current Facility-Administered Medications   Medication Dose Route Frequency    potassium chloride SR (KLOR-CON 10) tablet 40 mEq  40 mEq Oral BID    pantoprazole (PROTONIX) tablet 40 mg  40 mg Oral ACB    enoxaparin (LOVENOX) injection 30 mg  30 mg SubCUTAneous Q12H    bisacodyL (DULCOLAX) suppository 10 mg  10 mg Rectal DAILY PRN    senna-docusate (PERICOLACE) 8.6-50 mg per tablet 2 Tablet  2 Tablet Oral DAILY PRN    insulin lispro (HUMALOG) injection   SubCUTAneous AC&HS    metoprolol tartrate (LOPRESSOR) tablet 12.5 mg  12.5 mg Oral Q12H    furosemide (LASIX) injection 40 mg  40 mg IntraVENous BID    oxyCODONE IR (ROXICODONE) tablet 10 mg  10 mg Oral Q4H PRN    LORazepam (ATIVAN) injection 0.5 mg  0.5 mg IntraVENous Q8H PRN    albuterol-ipratropium (DUO-NEB) 2.5 MG-0.5 MG/3 ML  3 mL Nebulization BID RT    scopolamine (TRANSDERM-SCOP) 1 mg over 3 days 1 Patch  1 Patch TransDERmal Q72H    insulin glargine (LANTUS) injection 70 Units  70 Units SubCUTAneous Q12H    glucose chewable tablet 16 g  4 Tablet Oral PRN    levothyroxine (SYNTHROID) tablet 150 mcg  150 mcg Oral 6am    balsam peru-castor oiL (VENELEX) ointment   Topical BID    meropenem (MERREM) 1 g in 0.9% sodium chloride (MBP/ADV) 50 mL MBP  1 g IntraVENous Q8H    sodium chloride (NS) flush 5-40 mL  5-40 mL IntraVENous Q8H    sodium chloride (NS) flush 5-40 mL  5-40 mL IntraVENous PRN    0.45% sodium chloride infusion  10 mL/hr IntraVENous CONTINUOUS    naloxone (NARCAN) injection 0.4 mg  0.4 mg IntraVENous PRN    ondansetron (ZOFRAN) injection 4 mg  4 mg IntraVENous Q4H PRN    albuterol (PROVENTIL VENTOLIN) nebulizer solution 2.5 mg  2.5 mg Nebulization Q4H PRN    aspirin chewable tablet 81 mg  81 mg Oral DAILY    polyethylene glycol (MIRALAX) packet 17 g  17 g Oral DAILY    ELECTROLYTE REPLACEMENT NOTE: Nurse to review Serum Potassium and Magnesuim levels and Initiate Electrolyte Replacement Protocol as needed  1 Each Other PRN    glucagon (GLUCAGEN) injection 1 mg  1 mg IntraMUSCular PRN    dextrose 10 % infusion 0-250 mL  0-250 mL IntraVENous PRN    melatonin tablet 3-6 mg  3-6 mg Oral QHS PRN    lidocaine 4 % patch 2 Patch  2 Patch TransDERmal Q24H    LORazepam (ATIVAN) tablet 1 mg  1 mg Oral Q8H PRN    sertraline (ZOLOFT) tablet 100 mg  100 mg Oral DAILY    busPIRone (BUSPAR) tablet 15 mg  15 mg Oral TID     ______________________________________________________________________  EXPECTED LENGTH OF STAY: 1d 19h  ACTUAL LENGTH OF STAY:          Yung 28, MD   Pulmonary/[facility]  [address] [phone]

## 2022-12-05 NOTE — PROGRESS NOTES
Problem: Dysphagia (Adult)  Goal: *Acute Goals and Plan of Care (Insert Text)  Description: Speech path goals  1. Patient will tolerate ice chips only by small amount with staff. Goal met 12/3/22  2. Patient will participate with reeval of swallowing. Completed 12/3/22  3. Patient will tolerate easy to chew and nectar thick liquids with no overt s/s of aspiration. MET  Increase to thin liquids. 4. Patient will participate with imaging of swallowing as needed   Outcome: Progressing Towards Goal     SPEECH LANGUAGE PATHOLOGY DYSPHAGIA TREATMENT  Patient: Buel Bumpers (58 y.o. male)  Date: 12/5/2022  Diagnosis: Acute non-Q wave non-ST elevation myocardial infarction (NSTEMI) ([facility]) [I21.4]  Aortic stenosis [I35.0]  CAD (coronary artery disease) [I25.10] <principal problem not specified>  Procedure(s) (LRB):  SYLWIA BY DR Sri Josue -  CORONARY ARTERY BYPASS GRAFT  X 1 WITH LEFT INTERNAL MAMMARY ARTERY GRAFTING - AORTIC VALVE REPLACEMENT WITH MEYER 25MM INSPIRIS RESILIA TISSUE VALVE AND REPAIR OF ASCENDING AORTIC ANEURYSM (N/A) 13 Days Post-Op  Precautions: Aspiration Sternal (move in the tube)    ASSESSMENT:  Patient upright in chair. Patient tolerated mildly thickened liquids, purees and solids without difficulty and free of s/s aspiration. Patient reports sore throat which was irritated by solid trials. Patient tolerated ice chips and single straw sips of thin liquids without difficulty and free of s/s aspiration. With successive sips patient demonstrated delayed throat clear x 1. Vocal quality and cough remain strong and at baseline. Given bedside presentation feel patient is safe to advance to thin liquids in single sips. PLAN:  Recommendations and Planned Interventions:  Easy to chew diet  Thin liquids  Sit up for all meals  SMALL/SINGLE sips   May use straw  Medication one at a time with water or puree  Patient continues to benefit from skilled intervention to address the above impairments. Continue treatment per established plan of care. Discharge Recommendations: To Be Determined     SUBJECTIVE:   Patient stated My throat is sore. . Patient sitting up in chair. Patient and RN report excellent tolerance of diet. 6 L via NC. OBJECTIVE:   Cognitive and Communication Status:  Neurologic State: Alert  Orientation Level: Oriented X4  Cognition: Follows commands  Perception: Appears intact  Perseveration: Perseverates during mobility (on moaning)  Safety/Judgement: Awareness of environment, Insight into deficits, Fall prevention  Dysphagia Treatment:  Oral Assessment:     P.O. Trials:  Patient Position: Up in chair  Vocal quality prior to P.O.: No impairment  Consistency Presented: Ice chips; Thin liquid; Solid;Puree  How Presented: Self-fed/presented;Spoon;Straw;Successive swallows     Bolus Acceptance: No impairment  Bolus Formation/Control: No impairment     Propulsion: No impairment  Oral Residue: None        Aspiration Signs/Symptoms: Delayed cough/throat clear                        After treatment:   Patient left in no apparent distress in bed, Call bell within reach, and Nursing notified    COMMUNICATION/EDUCATION:   Patient was educated regarding role of SLP, diet consistency, safe swallow precautions and POC. The patient's plan of care including recommendations, planned interventions, and recommended diet changes were discussed with: Registered nurse.      LUIS Sherman  Time Calculation: 15 mins

## 2022-12-05 NOTE — PROGRESS NOTES
0720- Bedside and Verbal shift change report given to [facility] (oncoming nurse) by Mariana Glasgow RNs (offgoing nurse). Report included the following information SBAR, Kardex, Intake/Output, Recent Results, Cardiac Rhythm NSR, and Alarm Parameters . 0800- Shift assessment completed; see flow sheet for details. Pt is A/V/Ox4; cooperative; following commands. Currently in NSR; BP stable. Remains on NC; O2 sats are WNL. Fine crackles heard in the right lower lobe; continues with IV diureses. ABD is semi-soft; distended; BS active; continues with a regular diet with nectar thick liquids. Strong catheter in place; adequate UOP. Skin is warm and dry; sternotomy site is CDI. Pt assisted to the recliner chair with one person assist; tolerated transfer well.     0924- PRN Kristin given for incisional and back pain     Strong catheter removed without incident     1020- Pt up working with PT/OT; see progress note for details. Pt returned to the chair following therapy    Speech therapy at the bedside; diet advancement as noted    1205- Reassessment completed; see flow sheet for details. No acute changes in pt assessment. Remains in the recliner chair with family at the bedside     1430- Pt bathed and assisted back to the bed with one person assist; tolerated transfer well. HOB elevated for comfort . 1605- Reassessment completed; see flow sheet for details. No acute changes in pt assessment. 1740- Pt assisted to the commode; had a small BM and voided; however unable to measure void. Pt then assisted to the recliner chair for his dinner meal.     1930- Bedside and Verbal shift change report given to [address] (oncoming nurse) by Ladonna Ramos RN (offgoing nurse). Report included the following information SBAR, Kardex, Intake/Output, Recent Results, Cardiac Rhythm NSR, and Alarm Parameters .

## 2022-12-05 NOTE — PROGRESS NOTES
Problem: Self Care Deficits Care Plan (Adult)  Goal: *Acute Goals and Plan of Care (Insert Text)  Description:     FUNCTIONAL STATUS PRIOR TO ADMISSION: Patient was independent and active without use of DME.    HOME SUPPORT: The patient lived with his mother. Occupational Therapy Goals  Re-evaluation 12/2/2022  1. Patient will perform grooming sitting edge of bed or unsupported in chair with mod I after set up within 7 day(s). 2.  Patient will tolerate > or = 5 minutes static standing for functional task without UE support within 7 day(s). 3.  Patient will follow move in the tube precaution for bed mobility, sit to stand, stand to sit with 1-2 verbal cues within 7 day(s). 4.  Patient will transfer to and from toilet and complete all toileting tasks with CGA to minimal assist within 7 day(s). 5.  Patient will perform cardiac exercises with handout and min cues within 7 day(s). Initiated 11/23/2022  1. Patient will perform grooming standing at sink with supervision/set-up within 7 day(s). 2.  Patient will perform upper body dressing with supervision/set-up within 7 day(s). 3.  Patient will perform lower body dressing with supervision/set-up within 7 day(s). 4.  Patient will perform toilet transfers with supervision/set-up within 7 day(s). 5.  Patient will perform all aspects of toileting with supervision/set-up within 7 day(s).          Outcome: Progressing Towards Goal     OCCUPATIONAL THERAPY TREATMENT  Patient: Jeffery Perdomo (87 y.o. male)  Date: 12/5/2022  Diagnosis: Acute non-Q wave non-ST elevation myocardial infarction (NSTEMI) ([facility]) [I21.4]  Aortic stenosis [I35.0]  CAD (coronary artery disease) [I25.10] <principal problem not specified>  Procedure(s) (LRB):  SYLWIA BY DR Eric Liz -  CORONARY ARTERY BYPASS GRAFT  X 1 WITH LEFT INTERNAL MAMMARY ARTERY GRAFTING - AORTIC VALVE REPLACEMENT WITH MEYER 25MM INSPIRIS RESILIA TISSUE VALVE AND REPAIR OF ASCENDING AORTIC ANEURYSM (N/A) 13 Days Post-Op  Precautions: Sternal (move in the tube)  Chart, occupational therapy assessment, plan of care, and goals were reviewed. ASSESSMENT  Patient Presented up in chair, agreeable to participation in therapy. Overall he was min A to CGA for transfers, CGA for ambulation with a rollator and he was supervision/setup to max A for all ADLs performed this session. Patient required some cueing for consistent adherence to his sternal/move in the tube precautions during ADLs and transfers, but he is receptive and demonstrating carryover. VSS t/o all activity performed this session. No major LOB during standing ADLs or ambulation, but he is mildly unsteady. At this time the patient continues to benefit from acute OT and will need inpatient rehab after discharge. PLAN :  Patient continues to benefit from skilled intervention to address the above impairments. Continue treatment per established plan of care. to address goals. Recommendation for discharge: (in order for the patient to meet his/her long term goals)  Therapy 3 hours per day 5-7 days per week      Equipment recommendations for successful discharge (if) home: TBD pending progress. OBJECTIVE DATA SUMMARY:   Cognitive/Behavioral Status:  Neurologic State: Alert  Orientation Level: Oriented X4  Cognition: Follows commands        Safety/Judgement: Awareness of environment; Insight into deficits;Good awareness of safety precautions    Functional Mobility and Transfers for ADLs:  Bed Mobility:  Scooting: Supervision    Transfers:  Sit to Stand: Minimum assistance;Contact guard assistance (initially Marilyn, progressing to CGA)  Functional Transfers  Bathroom Mobility: Contact guard assistance (ambulating with a Rollator)  Toilet Transfer : Contact guard assistance  Cues: Verbal cues provided       Balance:  Sitting: Intact  Standing: Impaired  Standing - Static: Good  Standing - Dynamic : Good;Fair    ADL Intervention:  Grooming  Position Performed: Standing  Washing Face: Supervision;Set-up  Washing Hands: Supervision;Set-up  Brushing Teeth: Supervision;Set-up    Upper Body Dressing Assistance  Shirt simulation with hospital gown: Maximum assistance; Compensatory technique training (due to BUE weakness)  Cues: Tactile cues provided;Verbal cues provided;Visual cues provided    Toileting  Clothing Management: Moderate assistance  Cues: Verbal cues provided; Tactile cues provided    Cognitive Retraining  Safety/Judgement: Awareness of environment; Insight into deficits;Good awareness of safety precautions    Pain:  No complaint of pain    Activity Tolerance:   Fair  VSS on 6 L NC t/o all activity    After treatment patient left in no apparent distress:   Sitting in chair and Call bell within reach    COMMUNICATION/COLLABORATION:   The patients plan of care was discussed with: Physical Therapist and Registered Nurse    TETO Soares/L  Time Calculation: 18 mins

## 2022-12-06 ENCOUNTER — TRANSCRIBE ORDER (OUTPATIENT)
Dept: CARDIAC REHAB | Age: 42
End: 2022-12-06

## 2022-12-06 ENCOUNTER — APPOINTMENT (OUTPATIENT)
Dept: GENERAL RADIOLOGY | Age: 42
End: 2022-12-06
Attending: NURSE PRACTITIONER
Payer: COMMERCIAL

## 2022-12-06 DIAGNOSIS — Z95.1 S/P CABG (CORONARY ARTERY BYPASS GRAFT): Primary | ICD-10-CM

## 2022-12-06 LAB
ALBUMIN SERPL-MCNC: 2.8 G/DL (ref 3.5–5)
ALBUMIN/GLOB SERPL: 0.8 {RATIO} (ref 1.1–2.2)
ALP SERPL-CCNC: 193 U/L (ref 45–117)
ALT SERPL-CCNC: 138 U/L (ref 12–78)
ANION GAP SERPL CALC-SCNC: 7 MMOL/L (ref 5–15)
AST SERPL-CCNC: 36 U/L (ref 15–37)
BASOPHILS # BLD: 0.1 K/UL (ref 0–0.1)
BASOPHILS NFR BLD: 1 % (ref 0–1)
BILIRUB SERPL-MCNC: 0.8 MG/DL (ref 0.2–1)
BUN SERPL-MCNC: 19 MG/DL (ref 6–20)
BUN/CREAT SERPL: 19 (ref 12–20)
C-ANCA TITR SER IF: NORMAL TITER
CALCIUM SERPL-MCNC: 8.8 MG/DL (ref 8.5–10.1)
CHLORIDE SERPL-SCNC: 99 MMOL/L (ref 97–108)
CO2 SERPL-SCNC: 29 MMOL/L (ref 21–32)
CREAT SERPL-MCNC: 1 MG/DL (ref 0.7–1.3)
DIFFERENTIAL METHOD BLD: ABNORMAL
EOSINOPHIL # BLD: 0.3 K/UL (ref 0–0.4)
EOSINOPHIL NFR BLD: 3 % (ref 0–7)
ERYTHROCYTE [DISTWIDTH] IN BLOOD BY AUTOMATED COUNT: 15.3 % (ref 11.5–14.5)
GLOBULIN SER CALC-MCNC: 3.6 G/DL (ref 2–4)
GLUCOSE BLD STRIP.AUTO-MCNC: 191 MG/DL (ref 65–117)
GLUCOSE BLD STRIP.AUTO-MCNC: 244 MG/DL (ref 65–117)
GLUCOSE BLD STRIP.AUTO-MCNC: 270 MG/DL (ref 65–117)
GLUCOSE BLD STRIP.AUTO-MCNC: 92 MG/DL (ref 65–117)
GLUCOSE SERPL-MCNC: 229 MG/DL (ref 65–100)
HCT VFR BLD AUTO: 32.5 % (ref 36.6–50.3)
HGB BLD-MCNC: 10 G/DL (ref 12.1–17)
IMM GRANULOCYTES # BLD AUTO: 0 K/UL (ref 0–0.04)
IMM GRANULOCYTES NFR BLD AUTO: 0 % (ref 0–0.5)
INR PPP: 1.1 (ref 0.9–1.1)
LYMPHOCYTES # BLD: 1.8 K/UL (ref 0.8–3.5)
LYMPHOCYTES NFR BLD: 21 % (ref 12–49)
MAGNESIUM SERPL-MCNC: 2.3 MG/DL (ref 1.6–2.4)
MCH RBC QN AUTO: 26.9 PG (ref 26–34)
MCHC RBC AUTO-ENTMCNC: 30.8 G/DL (ref 30–36.5)
MCV RBC AUTO: 87.4 FL (ref 80–99)
MONOCYTES # BLD: 0.4 K/UL (ref 0–1)
MONOCYTES NFR BLD: 5 % (ref 5–13)
MYELOPEROXIDASE AB SER IA-ACNC: <0.2 UNITS (ref 0–0.9)
NEUTS SEG # BLD: 6.1 K/UL (ref 1.8–8)
NEUTS SEG NFR BLD: 70 % (ref 32–75)
NRBC # BLD: 0 K/UL (ref 0–0.01)
NRBC BLD-RTO: 0 PER 100 WBC
P-ANCA ATYPICAL TITR SER IF: NORMAL TITER
P-ANCA TITR SER IF: NORMAL TITER
PLATELET # BLD AUTO: 239 K/UL (ref 150–400)
PMV BLD AUTO: 9.2 FL (ref 8.9–12.9)
POTASSIUM SERPL-SCNC: 4.5 MMOL/L (ref 3.5–5.1)
PROT SERPL-MCNC: 6.4 G/DL (ref 6.4–8.2)
PROTEINASE3 AB SER IA-ACNC: <0.2 UNITS (ref 0–0.9)
PROTHROMBIN TIME: 11.8 SEC (ref 9–11.1)
RBC # BLD AUTO: 3.72 M/UL (ref 4.1–5.7)
RBC MORPH BLD: ABNORMAL
RBC MORPH BLD: ABNORMAL
SERVICE CMNT-IMP: ABNORMAL
SERVICE CMNT-IMP: NORMAL
SODIUM SERPL-SCNC: 135 MMOL/L (ref 136–145)
WBC # BLD AUTO: 8.7 K/UL (ref 4.1–11.1)

## 2022-12-06 PROCEDURE — 65270000046 HC RM TELEMETRY

## 2022-12-06 PROCEDURE — 77010033678 HC OXYGEN DAILY

## 2022-12-06 PROCEDURE — 97530 THERAPEUTIC ACTIVITIES: CPT | Performed by: PHYSICAL THERAPIST

## 2022-12-06 PROCEDURE — 74011250637 HC RX REV CODE- 250/637: Performed by: NURSE PRACTITIONER

## 2022-12-06 PROCEDURE — 74011250636 HC RX REV CODE- 250/636: Performed by: NURSE PRACTITIONER

## 2022-12-06 PROCEDURE — 97110 THERAPEUTIC EXERCISES: CPT | Performed by: OCCUPATIONAL THERAPIST

## 2022-12-06 PROCEDURE — 92526 ORAL FUNCTION THERAPY: CPT

## 2022-12-06 PROCEDURE — 99233 SBSQ HOSP IP/OBS HIGH 50: CPT | Performed by: CLINICAL NURSE SPECIALIST

## 2022-12-06 PROCEDURE — 74011636637 HC RX REV CODE- 636/637: Performed by: NURSE PRACTITIONER

## 2022-12-06 PROCEDURE — 83735 ASSAY OF MAGNESIUM: CPT

## 2022-12-06 PROCEDURE — 74011250637 HC RX REV CODE- 250/637: Performed by: THORACIC SURGERY (CARDIOTHORACIC VASCULAR SURGERY)

## 2022-12-06 PROCEDURE — 97116 GAIT TRAINING THERAPY: CPT | Performed by: PHYSICAL THERAPIST

## 2022-12-06 PROCEDURE — 71045 X-RAY EXAM CHEST 1 VIEW: CPT

## 2022-12-06 PROCEDURE — 74011000250 HC RX REV CODE- 250: Performed by: NURSE PRACTITIONER

## 2022-12-06 PROCEDURE — 97530 THERAPEUTIC ACTIVITIES: CPT | Performed by: OCCUPATIONAL THERAPIST

## 2022-12-06 PROCEDURE — 36415 COLL VENOUS BLD VENIPUNCTURE: CPT

## 2022-12-06 PROCEDURE — 97535 SELF CARE MNGMENT TRAINING: CPT | Performed by: OCCUPATIONAL THERAPIST

## 2022-12-06 PROCEDURE — 94640 AIRWAY INHALATION TREATMENT: CPT

## 2022-12-06 PROCEDURE — 80053 COMPREHEN METABOLIC PANEL: CPT

## 2022-12-06 PROCEDURE — 82962 GLUCOSE BLOOD TEST: CPT

## 2022-12-06 PROCEDURE — 85610 PROTHROMBIN TIME: CPT

## 2022-12-06 PROCEDURE — 85025 COMPLETE CBC W/AUTO DIFF WBC: CPT

## 2022-12-06 RX ORDER — INSULIN LISPRO 100 [IU]/ML
20 INJECTION, SOLUTION INTRAVENOUS; SUBCUTANEOUS
Status: DISCONTINUED | OUTPATIENT
Start: 2022-12-06 | End: 2022-12-07

## 2022-12-06 RX ORDER — OXYCODONE HYDROCHLORIDE 5 MG/1
5-10 TABLET ORAL
Status: DISCONTINUED | OUTPATIENT
Start: 2022-12-06 | End: 2022-12-09 | Stop reason: HOSPADM

## 2022-12-06 RX ORDER — METOPROLOL TARTRATE 25 MG/1
25 TABLET, FILM COATED ORAL EVERY 12 HOURS
Status: DISCONTINUED | OUTPATIENT
Start: 2022-12-06 | End: 2022-12-09 | Stop reason: HOSPADM

## 2022-12-06 RX ORDER — POTASSIUM CHLORIDE 750 MG/1
40 TABLET, FILM COATED, EXTENDED RELEASE ORAL DAILY
Status: DISCONTINUED | OUTPATIENT
Start: 2022-12-06 | End: 2022-12-09 | Stop reason: HOSPADM

## 2022-12-06 RX ADMIN — ENOXAPARIN SODIUM 30 MG: 100 INJECTION SUBCUTANEOUS at 09:40

## 2022-12-06 RX ADMIN — BUSPIRONE HYDROCHLORIDE 15 MG: 5 TABLET ORAL at 09:40

## 2022-12-06 RX ADMIN — OXYCODONE 5 MG: 5 TABLET ORAL at 10:32

## 2022-12-06 RX ADMIN — IPRATROPIUM BROMIDE AND ALBUTEROL SULFATE 3 ML: 2.5; .5 SOLUTION RESPIRATORY (INHALATION) at 21:56

## 2022-12-06 RX ADMIN — CASTOR OIL AND BALSAM, PERU: 788; 87 OINTMENT TOPICAL at 10:42

## 2022-12-06 RX ADMIN — OXYCODONE 5 MG: 5 TABLET ORAL at 13:54

## 2022-12-06 RX ADMIN — SODIUM CHLORIDE, PRESERVATIVE FREE 10 ML: 5 INJECTION INTRAVENOUS at 06:14

## 2022-12-06 RX ADMIN — FUROSEMIDE 40 MG: 10 INJECTION, SOLUTION INTRAMUSCULAR; INTRAVENOUS at 17:25

## 2022-12-06 RX ADMIN — Medication 3 UNITS: at 08:22

## 2022-12-06 RX ADMIN — PANTOPRAZOLE SODIUM 40 MG: 40 TABLET, DELAYED RELEASE ORAL at 09:40

## 2022-12-06 RX ADMIN — INSULIN GLARGINE 60 UNITS: 100 INJECTION, SOLUTION SUBCUTANEOUS at 22:00

## 2022-12-06 RX ADMIN — BUSPIRONE HYDROCHLORIDE 15 MG: 5 TABLET ORAL at 21:58

## 2022-12-06 RX ADMIN — BUSPIRONE HYDROCHLORIDE 15 MG: 5 TABLET ORAL at 13:47

## 2022-12-06 RX ADMIN — SODIUM CHLORIDE, PRESERVATIVE FREE 10 ML: 5 INJECTION INTRAVENOUS at 13:54

## 2022-12-06 RX ADMIN — INSULIN GLARGINE 60 UNITS: 100 INJECTION, SOLUTION SUBCUTANEOUS at 11:40

## 2022-12-06 RX ADMIN — ENOXAPARIN SODIUM 30 MG: 100 INJECTION SUBCUTANEOUS at 20:22

## 2022-12-06 RX ADMIN — FUROSEMIDE 40 MG: 10 INJECTION, SOLUTION INTRAMUSCULAR; INTRAVENOUS at 09:40

## 2022-12-06 RX ADMIN — SERTRALINE 100 MG: 50 TABLET, FILM COATED ORAL at 09:40

## 2022-12-06 RX ADMIN — METOPROLOL TARTRATE 25 MG: 25 TABLET, FILM COATED ORAL at 09:40

## 2022-12-06 RX ADMIN — LEVOTHYROXINE SODIUM 150 MCG: 0.07 TABLET ORAL at 06:14

## 2022-12-06 RX ADMIN — Medication 2 UNITS: at 21:59

## 2022-12-06 RX ADMIN — METOPROLOL TARTRATE 25 MG: 25 TABLET, FILM COATED ORAL at 21:58

## 2022-12-06 RX ADMIN — Medication 20 UNITS: at 11:41

## 2022-12-06 RX ADMIN — IPRATROPIUM BROMIDE AND ALBUTEROL SULFATE 3 ML: 2.5; .5 SOLUTION RESPIRATORY (INHALATION) at 07:56

## 2022-12-06 RX ADMIN — SODIUM CHLORIDE, PRESERVATIVE FREE 10 ML: 5 INJECTION INTRAVENOUS at 21:59

## 2022-12-06 RX ADMIN — Medication 7 UNITS: at 11:40

## 2022-12-06 RX ADMIN — POTASSIUM CHLORIDE 40 MEQ: 750 TABLET, FILM COATED, EXTENDED RELEASE ORAL at 09:39

## 2022-12-06 RX ADMIN — CASTOR OIL AND BALSAM, PERU: 788; 87 OINTMENT TOPICAL at 21:58

## 2022-12-06 RX ADMIN — ASPIRIN 81 MG CHEWABLE TABLET 81 MG: 81 TABLET CHEWABLE at 09:40

## 2022-12-06 RX ADMIN — LORAZEPAM 0.5 MG: 2 INJECTION INTRAMUSCULAR; INTRAVENOUS at 20:23

## 2022-12-06 RX ADMIN — OXYCODONE 5 MG: 5 TABLET ORAL at 03:24

## 2022-12-06 NOTE — PROGRESS NOTES
[facility] ICU Progress Note    Admit Date: 11/15/2022  POD:  14 Day Post-Op    Procedure:  Procedure(s):  SYLWIA BY DR Keren Kumar -  CORONARY ARTERY BYPASS GRAFT  X 1 WITH LEFT INTERNAL MAMMARY ARTERY GRAFTING - AORTIC VALVE REPLACEMENT WITH MYEER 25MM INSPIRIS RESILIA TISSUE VALVE AND REPAIR OF ASCENDING AORTIC ANEURYSM        Subjective/Interval:   Pt seen with Dr. Quinn Feeling. Feeling well this am. Up to chair. Minimal complaints of pain. On 2L NC. Afebrile. Objective:   Vitals:  Blood pressure 118/66, pulse 92, temperature 98.1 °F (36.7 °C), resp. rate 19, height 5' 9\" (1.753 m), weight 252 lb 13.9 oz (114.7 kg), SpO2 98 %. Temp (24hrs), Av.4 °F (36.9 °C), Min:97.9 °F (36.6 °C), Max:98.9 °F (37.2 °C)    EKG/Rhythm:  NSR-ST 90-100s    Oxygen Therapy:  Oxygen Therapy  O2 Sat (%): 98 % (22 0756)  Pulse via Oximetry: 94 beats per minute (22 0756)  O2 Device: None (Room air) (22 0756)  Skin Assessment: Clean, dry, & intact (22 1200)  Skin Protection for O2 Device: N/A (22 1200)  Orientation: Anterior;Bilateral (22 0800)  Location: Cheek (22)  Interventions: Skin Barrier (22 1800)  O2 Flow Rate (L/min): 2 l/min (22 0316)  O2 Temperature: 87.8 °F (31 °C) (22 0625)  FIO2 (%): (S) 35 % (22 0407)    CXR  CXR Results  (Last 48 hours)                 22 0444  XR CHEST PORT Final result    Impression:  1. Mild bibasilar atelectasis as described above. Follow-up to resolution is   suggested. This has improved in the interval.       Narrative:  EXAM:  XR CHEST PORT       INDICATION: Postop heart surgery       COMPARISON: 2022       TECHNIQUE: Upright portable chest AP view at 0437       FINDINGS: The patient is status post median sternotomy. Mild cardiomegaly. Lungs   demonstrate mild bibasilar atelectasis medially in the lower lobes left greater   than right.  Small rounded opacity measuring approximately 1 cm at the right lung   base was not present previously and is likely an area of atelectasis. This can   be evaluated on follow-up. No pneumothorax. No pulmonary edema. 12/05/22 0504  XR CHEST PORT Final result    Impression:  impression: No acute changes. Narrative:  EXAM:  XR CHEST PORT       INDICATION: Postop       COMPARISON: December 4       TECHNIQUE: portable chest AP view       FINDINGS: Stable cardiac silhouette, prior sternotomy. No acute infiltrate or   shift. Removal of central line. Admission Weight: Last Weight   Weight: 257 lb 15 oz (117 kg) Weight: 252 lb 13.9 oz (114.7 kg)     Intake / Output / Drain:  Current Shift: No intake/output data recorded. Last 24 hrs.:   Intake/Output Summary (Last 24 hours) at 12/6/2022 0850  Last data filed at 12/6/2022 0533  Gross per 24 hour   Intake 1940 ml   Output 1490 ml   Net 450 ml     EXAM:  General:  Alert, appropriate, NAD. Lungs:    Diminished to auscultation bilaterally   Incision:  Incision clean, dry and intact and prineo intact   Heart:   Regular rate and rhythm  and no murmur, rubs or gallops   Abdomen:    Distended, soft, hypoactive bowel sounds, and obese. Extremities:  Trace edema BLE, BUE   Neurologic:  Moving all extremities purposefully. A&O x4. Sensory intact     Labs:   Recent Labs     12/06/22  0812 12/06/22  0326   WBC  --  8.7   HGB  --  10.0*   HCT  --  32.5*   PLT  --  239   NA  --  135*   K  --  4.5   BUN  --  19   CREA  --  1.00   GLU  --  229*   GLUCPOC 191*  --    INR  --  1.1        Assessment:     Active Problems:    Acute non-Q wave non-ST elevation myocardial infarction (NSTEMI) ([facility] [address] (11/15/2022)      Aortic stenosis (11/22/2022)      CAD (coronary artery disease) (11/22/2022)      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)      Overview:  On pump CORONARY ARTERY BYPASS GRAFT  X 1 WITH LEFT INTERNAL MAMMARY       ARTERY to LAD      AORTIC VALVE REPLACEMENT WITH MEYER 25MM INSPIRIS RESILIA TISSUE VALVE       REPAIR OF ASCENDING AORTIC ANEURYSM with 26mm hemashield graft      S/P AVR (aortic valve replacement) and aortoplasty (11/22/2022)      Overview: On pump CORONARY ARTERY BYPASS GRAFT  X 1 WITH LEFT INTERNAL MAMMARY       ARTERY to LAD      AORTIC VALVE REPLACEMENT WITH MEYER 25MM INSPIRIS RESILIA TISSUE VALVE       REPAIR OF ASCENDING AORTIC ANEURYSM with 26mm hemashield graft       Plan/Recommendations/Medical Decision Making:     Severe symptomatic AS, s/p tissue AVR. Continue ASA. CAD, STEMI, s/p CABG. Continue ASA. Holding amio, statin for transaminitis. Repeat Echo without effusion or tamponade, NL LV/RVF. Acute hypoxic respiratory failure:Extubated, cont to wean O2 as able. Cont diuresis, hourly IS. Nebs BID and PRN. Flutter valve. On 2L NC  SERGE: Crt up to 2.01. Nephrology consult appreciated, avoid nephrotoxins, trend. Crt 1.00 this am. Change Lasix 40 mg IV BID with scheduled K  Acute blood loss anemia: improving, H&H up to 10/32.5 this am  Transaminitis. Acute Severe Hepatitis, GI consult appreciated, + hepatomegaly and splenomegaly. Viral panel neg. Triple phase liver scan completed 11/30. No hepatic of splenic ischemia noted. + hepatic steatosis. Labs cont to trend down. Leukocytosis: BC, Sputum and urine cultures NGTD. ID following, Abx changed to Meropenem only. WBC up to 22.6, down to 8.7 today, Afebrile. Procal cont to trend down, 0.09 this am  HTN: On ACEi, BB PTA; increase metoprolol this am  HLD: holding statin. WALLY , not on CPAP. Was unable to tolerate due to anxiety; he has been working on this with Sleep Medicine. OP follow-up. refusing our CPAP, will have his approved for use  DMII. On Toujeo at home. Repeat A1C 9.0. Diabetes management following. Cont lantus   GERD. Continue PPI. Anxiety and depression. On Buspar, Zoloft; continue. Supportive care. Ileus: + BMs. Reduce bowel regimen. Nutrition: Advance diet as tolerated, plan to be seen by speech again today. Deconditioning/mobility: OOB TID, ambulating BID. Arm exercises. PT stating IPR over the weekend, will reassess this am. Pt wishes to go home with [place] PT, will cont to work to see if he is able. Order placed for referral to [facility]     DVT ppx- Lovenox  Dispo- PT/OT. OOB TID, Ambulating BID. Transfer to stepdown when bed available.  Case management following to aid in d/c planning, d/c TBD      Signed By: Michael Verdugo NP

## 2022-12-06 NOTE — PROGRESS NOTES
SPEECH LANGUAGE PATHOLOGY DYSPHAGIA TREATMENT  Patient: Gabbie Moreno (87 y.o. male)  Date: 12/6/2022  Diagnosis: Acute non-Q wave non-ST elevation myocardial infarction (NSTEMI) ([facility]) [I21.4]  Aortic stenosis [I35.0]  CAD (coronary artery disease) [I25.10] <principal problem not specified>  Procedure(s) (LRB):  SYLWIA BY DR Nino Rivers -  CORONARY ARTERY BYPASS GRAFT  X 1 WITH LEFT INTERNAL MAMMARY ARTERY GRAFTING - AORTIC VALVE REPLACEMENT WITH MEYER 25MM INSPIRIS RESILIA TISSUE VALVE AND REPAIR OF ASCENDING AORTIC ANEURYSM (N/A) 14 Days Post-Op  Precautions: Sternal (move in the tube)    ASSESSMENT:  Patient seen for dysphagia therapy session. PO trials completed of thin liquids and solids. Functional oropharyngeal swallow appreciated during bedside assessment, including with system stress via successive large boluses. No overt s/s of aspiration occurred. Patient endorsed sore throat which has been gradually improving. Reviewed aspiration precautions/swallow strategies with patient who verbalized understanding. Patient reported he has history of reflux for which he had planned to see GI specialist prior to this hospitalization. SLP will continue to follow. PLAN:  Recommendations and Planned Interventions:  Advance PO diet: Regular texture with Thin liquids  Medications orally as tolerated  Oral care via standard toothbrush 2-3x/daily  Aspiration precautions: Upright to 90 degrees for PO intake, small bites, remain upright for 30 minutes following meals  Set-up feeding assistance   SLP will follow for dysphagia management    Patient continues to benefit from skilled intervention to address the above impairments. Continue treatment per established plan of care. Discharge Recommendations: Follow up with MD/GI for reflux management     SUBJECTIVE:   Patient stated I really want to eat a salad.     OBJECTIVE:   Cognitive and Communication Status:  Neurologic State: Alert  Orientation Level: Oriented X4  Cognition: Follows commands, Appropriate for age attention/concentration  Safety/Judgement: Awareness of environment, Insight into deficits    Dysphagia Treatment:  Oral Assessment:  P.O. Trials:  Patient Position: Upright in bed  Vocal quality prior to P.O.: No impairment  Consistency Presented: Solid; Thin liquid  How Presented: Self-fed/presented; Successive swallows  Bolus Acceptance: No impairment  Bolus Formation/Control: No impairment  Propulsion: No impairment  Oral Residue: None  Initiation of Swallow: Present  Laryngeal Elevation: Functional  Aspiration Signs/Symptoms: None    After treatment:   Patient left in no apparent distress sitting up in chair, Call bell within reach, and Nursing notified    COMMUNICATION/EDUCATION:   The patient's plan of care including recommendations, planned interventions, and recommended diet changes were discussed with: Registered nurse. LUIS Law  Time Calculation: 15 mins         Problem: Dysphagia (Adult)  Goal: *Acute Goals and Plan of Care (Insert Text)  Description: Speech path goals  1. Patient will tolerate ice chips only by small amount with staff. Goal met 12/3/22  2. Patient will participate with reeval of swallowing. Completed 12/3/22  3. Patient will tolerate easy to chew and nectar thick liquids with no overt s/s of aspiration. MET  Increase to thin liquids. 4. Patient will participate with imaging of swallowing as needed. 5. Patient will tolerate regular texture diet with thin liquids without clinical s/s of aspiration within seven days. Goal initiated 12/6/22.    Outcome: Progressing Towards Goal

## 2022-12-06 NOTE — PROGRESS NOTES
Transition of Care Plan:    RUR:  14%  Disposition:  IP Rehab  If SNF or IPR: Date FOC offered:  12/6/22  Date FOC received:  12/6/22  Date authorization started with reference number:  Date authorization received and expires:  Accepting facility:  Follow up appointments:  PCP; CT Surg  DME needed:  TBD  Transportation at Discharge:  likely BLS  [place] or means to access home:    mother    IM Medicare Letter:  n/a  Is patient a  and connected with the [place]? No            If yes, was [employer] transfer form completed and [facility] notified? Caregiver Contact:  Amos [phone]  Discharge Caregiver contacted prior to discharge? Care Conference needed?:   No    Reason for Admission:  NSTEMI                     RUR Score:     14%                Plan for utilizing home health:    TBD - may need rehab      PCP: First and Last name:  Enedelia Veras MD     Name of Practice:    Are you a current patient: Yes/No:    Approximate date of last visit:    Can you participate in a virtual visit with your PCP:                     Current Advanced Directive/Advance Care Plan: Full Code      Healthcare Decision Maker:   Click here to complete [id number] including selection of the Healthcare Decision Maker Relationship (ie \"Primary\")             Primary Decision MakerStefano Cabot - Mother - [phone]    Secondary Decision Maker: Lorna Sons - Girlfriend - [phone]                  Transition of Care Plan:     Pt lives w/ his mother and was indep at baseline to include employment and driving. Pt underwent CABG/AAA. Required intubation at one point but now extubated and using  2 lpm 02. PT/OT have evaluated and found pt to be significantly below indep baseline - IP Rehab is being recommended. FOC was offered for IP Rehab providers - pt did not have a preference. REfs sent via [facility] to [facility] and [facility]. If accepted, will need insur auth.     Mode of transport at [place] to be determined. Care Management Interventions  PCP Verified by CM:  Yes  Palliative Care Criteria Met (RRAT>21 & CHF Dx)?: No  Mode of Transport at Discharge: BLS  Transition of Care Consult (CM Consult): Discharge Planning  Physical Therapy Consult: Yes  Occupational Therapy Consult: Yes  Speech Therapy Consult: No  Support Systems: Inpatient Rehab Facility  Confirm Follow Up Transport: Family  Discharge Location  Patient Expects to be Discharged to[de-identified] Rehab hospital/unit acute     JOSE MIGUEL Tyson

## 2022-12-06 NOTE — CARDIO/PULMONARY
Cardiac Rehab Note: chart review/referral     Consult has been acknowledged     Patient status post CABG and AVR 11/22/2022. Smoking history assessed. Patient is a non smoker. Smoking Cessation Program link has not been added to the AVS.     EF 55%  on 11/27/2022 per echo. CABG  educational folder with \"Cardiac Surgery Post-Op Instructions\" book, heart healthy eating, warning signs, heart facts, heart aware, resources, and CABG  Surgery booklet to Partha Duran on 12/06/2022  . Educated using teach back method. Reviewed the use of bear for sternal support, daily weight and temperature monitoring, showering restrictions, signs and symptoms of infection at surgery sites, daily walking and arm exercises, and use of incentive spirometer. Partha Duran was able to demonstrate proper use of incentive spirometer. Reviewed risk factors for CAD to include the following: family history, elevated BMI, hyperlipidemia, hypertension, diabetes, stress, and smoking. Discussed Heart Healthy/Low Sodium (less than 2000 mg.) diet. Emphasized the value of cardiac rehab. Discussed Cardiac Rehab Program format, benefits, and encouraged enrollment to assist with risk modification and management. Initial Cardiac Rehab Program intake appointment scheduled for December 20, 2022 @2pm and appointment information is on the AVS. Patient provided orientation packet, instructed to complete packet a couple days prior to appointment, wear gym appropriate clothing and shoes, and bring current list of medications. Patient is to call if unable to keep appointment, need to reschedule or additional questions. Partha Duran verbalized understanding with questions answered. CP Rehab will continue to follow for educational needs.

## 2022-12-06 NOTE — PROGRESS NOTES
Critical Care Progress Note  Laurie Gifford MD          Date of Service:  2022  NAME:  Darshana Cardenas  :  1980  MRN:  [id number]      Subjective/Hospital course:      37 y/o male POD 1 from CABG x1 an AV valve replacement. He arrived intubated and on pressors. Extubated  on POD 0 @ 19:00. Noted to have SERGE with creatinine rise 2. Possibly secondary to hypotension in OR. Night of  was worked up for hypoxemia. This morning he tested positive for Covid. Case discussed with cT surgery. Most likely diagnosis is PE most likely secondary to hypercoaguable state from Covid. Pt had no respiratory symptoms preop or post op suggestive of URI      - Overnight developed hypoxemia. Pt had bronch and SYLWIA neither of which explained cause for hypoxemia. He required re-intubation and was put on veletri.  - intermittent desaturation events. CXR this a.m. with pulmonary edema   - fio2 improved   : fio2 and PEEP requirement has increased, still requiring epi at 2 mcg/min   : remains on epinephrine drip, sedated, fio2 requirement decreased to 80%'LFT's are trending   Down   : remains on epi drip, diueresed well with lasix drip,fio2 decreased to 50% and peep to 6  : Patient remains intubated and sedated. Being diuresed with lasix gtt. : Remains intubated, sedation is being weaned, able to follow commands. : Reports feeling better, extubated on . On high flow oxygen. 12/3: om mid flow at 6 L, bipap at night, c/o pain and want something for pain   : afebrile, but Tmax was 101 yesterday. WBC trending down, on midflow at 5-6L/minute   : did not use his cpap last night.  Afebrile   : refused cpap last night      Assessment/Plan:     PULMONARY:  Acute hypoxic resopiratory failure   Covid rapid +, PCR negative  D dimer / LE doppler negative - unlikely PE  Pulmonary edema/ pleural effusions  Ct chest : no PE  Plan  - - Continue nebulized bronchodilators and steroids  - Continue diuresis as tolerated. Cpap at night  Might need home o2    CARDIOVASCULAR/HEMODYNAMIC:  Off pressors. Plan    - ICU hemodynamic/cardiac monitoring  - MAP goal > 65 mmHg      RENAL:  SERGE : improving    Plan  - Monitor I/Os and UO  - Monitor renal function panel intermittently  - Correct electrolytes as needed  - Avoid nephrotoxic meds      GI/NUTRITION:  Continue reglan  Laxatives to move bowels  Continue cardiac diet    INFECTIOUS DISEASE  Covid ruled out with 1 negative PCR  Intermittent fevers : resolved      Micro: all cultures negative    NEURO  No acute issues at this time    ENDO : TSH is elevated  Continue synthroid to 150 mcg once a day    Plan    - Daily SAT when meets ICU criteria  - ABCDEF mobility bundle  - PT/OT     Transfer to floor  Will sign off at this time      Care Plan discussed with: attending, CTS NP and bedside nurse,  I personally spent 00 minutes of critical care time. This is time spent at this critically ill patient's bedside actively involved in patient care as well as the coordination of care and discussions with the patient's family. This does not include any procedural time which has been billed separately.       Review of Systems:   ROS   +ve for pain    Vital Signs:   Patient Vitals for the past 4 hrs:   BP Temp Pulse Resp SpO2   12/06/22 1105 (!) 119/58 -- 95 23 92 %   12/06/22 1100 -- -- 93 13 93 %   12/06/22 1030 -- -- 100 17 95 %   12/06/22 1025 -- -- 100 15 97 %   12/06/22 1000 -- -- (!) 103 25 --   12/06/22 0800 118/66 98.1 °F (36.7 °C) 92 19 --   12/06/22 0756 -- -- -- -- 98 %          Intake/Output Summary (Last 24 hours) at 12/6/2022 1126  Last data filed at 12/6/2022 1113  Gross per 24 hour   Intake 900 ml   Output 2450 ml   Net -1550 ml          Physical Examination:    Young, well built  HEENT: normal  Heart: s1,s2  Lungs: clear  Abd: soft  Ext: no edema  Cns: AAO x4    Labs and Imaging: Reviewed.       Medications:     Current Facility-Administered Medications   Medication Dose Route Frequency    potassium chloride SR (KLOR-CON 10) tablet 40 mEq  40 mEq Oral DAILY    metoprolol tartrate (LOPRESSOR) tablet 25 mg  25 mg Oral Q12H    insulin lispro (HUMALOG) injection 20 Units  20 Units SubCUTAneous TID WITH MEALS    pantoprazole (PROTONIX) tablet 40 mg  40 mg Oral ACB    enoxaparin (LOVENOX) injection 30 mg  30 mg SubCUTAneous Q12H    bisacodyL (DULCOLAX) suppository 10 mg  10 mg Rectal DAILY PRN    senna-docusate (PERICOLACE) 8.6-50 mg per tablet 2 Tablet  2 Tablet Oral DAILY PRN    insulin lispro (HUMALOG) injection   SubCUTAneous AC&HS    furosemide (LASIX) injection 40 mg  40 mg IntraVENous BID    insulin glargine (LANTUS) injection 60 Units  60 Units SubCUTAneous Q12H    sodium chloride (NS) flush 5-40 mL  5-40 mL IntraVENous Q8H    sodium chloride (NS) flush 5-40 mL  5-40 mL IntraVENous PRN    oxyCODONE IR (ROXICODONE) tablet 10 mg  10 mg Oral Q4H PRN    LORazepam (ATIVAN) injection 0.5 mg  0.5 mg IntraVENous Q8H PRN    albuterol-ipratropium (DUO-NEB) 2.5 MG-0.5 MG/3 ML  3 mL Nebulization BID RT    glucose chewable tablet 16 g  4 Tablet Oral PRN    levothyroxine (SYNTHROID) tablet 150 mcg  150 mcg Oral 6am    balsam peru-castor oiL (VENELEX) ointment   Topical BID    sodium chloride (NS) flush 5-40 mL  5-40 mL IntraVENous Q8H    sodium chloride (NS) flush 5-40 mL  5-40 mL IntraVENous PRN    naloxone (NARCAN) injection 0.4 mg  0.4 mg IntraVENous PRN    ondansetron (ZOFRAN) injection 4 mg  4 mg IntraVENous Q4H PRN    albuterol (PROVENTIL VENTOLIN) nebulizer solution 2.5 mg  2.5 mg Nebulization Q4H PRN    aspirin chewable tablet 81 mg  81 mg Oral DAILY    polyethylene glycol (MIRALAX) packet 17 g  17 g Oral DAILY    ELECTROLYTE REPLACEMENT NOTE: Nurse to review Serum Potassium and Magnesuim levels and Initiate Electrolyte Replacement Protocol as needed  1 Each Other PRN    glucagon (GLUCAGEN) injection 1 mg  1 mg IntraMUSCular PRN    dextrose 10 % infusion 0-250 mL  0-250 mL IntraVENous PRN    melatonin tablet 3-6 mg  3-6 mg Oral QHS PRN    lidocaine 4 % patch 2 Patch  2 Patch TransDERmal Q24H    sertraline (ZOLOFT) tablet 100 mg  100 mg Oral DAILY    busPIRone (BUSPAR) tablet 15 mg  15 mg Oral TID     ______________________________________________________________________  EXPECTED LENGTH OF STAY: 1d 19h  ACTUAL LENGTH OF STAY:          Larry Perea MD   Pulmonary/[facility]  [phone]

## 2022-12-06 NOTE — PROGRESS NOTES
Problem: Diabetes Self-Management  Goal: *Disease process and treatment process  Description: Define diabetes and identify own type of diabetes; list 3 options for treating diabetes. Outcome: Progressing Towards Goal  Goal: *Incorporating nutritional management into lifestyle  Description: Describe effect of type, amount and timing of food on blood glucose; list 3 methods for planning meals. Outcome: Progressing Towards Goal  Goal: *Incorporating physical activity into lifestyle  Description: State effect of exercise on blood glucose levels. Outcome: Progressing Towards Goal  Goal: *Developing strategies to promote health/change behavior  Description: Define the ABC's of diabetes; identify appropriate screenings, schedule and personal plan for screenings. Outcome: Progressing Towards Goal  Goal: *Using medications safely  Description: State effect of diabetes medications on diabetes; name diabetes medication taking, action and side effects. Outcome: Progressing Towards Goal  Goal: *Monitoring blood glucose, interpreting and using results  Description: Identify recommended blood glucose targets  and personal targets. Outcome: Progressing Towards Goal  Goal: *Prevention, detection, treatment of acute complications  Description: List symptoms of hyper- and hypoglycemia; describe how to treat low blood sugar and actions for lowering  high blood glucose level. Outcome: Progressing Towards Goal  Goal: *Prevention, detection and treatment of chronic complications  Description: Define the natural course of diabetes and describe the relationship of blood glucose levels to long term complications of diabetes.   Outcome: Progressing Towards Goal  Goal: *Developing strategies to address psychosocial issues  Description: Describe feelings about living with diabetes; identify support needed and support network  Outcome: Progressing Towards Goal  Goal: *Insulin pump training  Outcome: Progressing Towards Goal  Goal: *Sick day guidelines  Outcome: Progressing Towards Goal  Goal: *Patient Specific Goal (EDIT GOAL, INSERT TEXT)  Outcome: Progressing Towards Goal     Problem: Patient Education: Go to Patient Education Activity  Goal: Patient/Family Education  Outcome: Progressing Towards Goal     Problem: Falls - Risk of  Goal: *Absence of Falls  Description: Document Carlos Reyez Fall Risk and appropriate interventions in the flowsheet.   Outcome: Progressing Towards Goal  Note: Fall Risk Interventions:  Mobility Interventions: Assess mobility with egress test, Bed/chair exit alarm, Communicate number of staff needed for ambulation/transfer, OT consult for ADLs, PT Consult for mobility concerns, PT Consult for assist device competence, Utilize walker, cane, or other assistive device    Mentation Interventions: Adequate sleep, hydration, pain control, Bed/chair exit alarm, Evaluate medications/consider consulting pharmacy, Reorient patient, Toileting rounds, Update white board    Medication Interventions: Assess postural VS orthostatic hypotension, Bed/chair exit alarm, Evaluate medications/consider consulting pharmacy, Patient to call before getting OOB, Teach patient to arise slowly    Elimination Interventions: Bed/chair exit alarm, Call light in reach, Elevated toilet seat, Patient to call for help with toileting needs, Stay With Me (per policy), Toilet paper/wipes in reach, Urinal in reach, Toileting schedule/hourly rounds    History of Falls Interventions: Bed/chair exit alarm, Consult care management for discharge planning, Evaluate medications/consider consulting pharmacy, Vital signs minimum Q4HRs X 24 hrs (comment for end date)         Problem: Patient Education: Go to Patient Education Activity  Goal: Patient/Family Education  Outcome: Progressing Towards Goal     Problem: Patient Education: Go to Patient Education Activity  Goal: Patient/Family Education  Outcome: Progressing Towards Goal     Problem: Cath Lab Procedures: Pre-Procedure  Goal: Off Pathway (Use only if patient is Off Pathway)  Outcome: Progressing Towards Goal  Goal: Activity/Safety  Outcome: Progressing Towards Goal  Goal: Consults, if ordered  Outcome: Progressing Towards Goal  Goal: Diagnostic Test/Procedures  Outcome: Progressing Towards Goal  Goal: Nutrition/Diet  Outcome: Progressing Towards Goal  Goal: Discharge Planning  Outcome: Progressing Towards Goal  Goal: Medications  Outcome: Progressing Towards Goal  Goal: Respiratory  Outcome: Progressing Towards Goal  Goal: Treatments/Interventions/Procedures  Outcome: Progressing Towards Goal  Goal: Psychosocial  Outcome: Progressing Towards Goal  Goal: *Verbalize description of procedure  Outcome: Progressing Towards Goal  Goal: *Consent signed  Outcome: Progressing Towards Goal     Problem: Cath Lab Procedures: Post-Cath Day of Procedure (Initiate SCIP Measures for Post-Op Care)  Goal: Off Pathway (Use only if patient is Off Pathway)  Outcome: Progressing Towards Goal  Goal: Activity/Safety  Outcome: Progressing Towards Goal  Goal: Consults, if ordered  Outcome: Progressing Towards Goal  Goal: Diagnostic Test/Procedures  Outcome: Progressing Towards Goal  Goal: Nutrition/Diet  Outcome: Progressing Towards Goal  Goal: Discharge Planning  Outcome: Progressing Towards Goal  Goal: Medications  Outcome: Progressing Towards Goal  Goal: Respiratory  Outcome: Progressing Towards Goal  Goal: Treatments/Interventions/Procedures  Outcome: Progressing Towards Goal  Goal: Psychosocial  Outcome: Progressing Towards Goal  Goal: *Procedure site is without bleeding and signs of infection six hours post sheath removal  Outcome: Progressing Towards Goal  Goal: *Hemodynamically stable  Outcome: Progressing Towards Goal  Goal: *Optimal pain control at patient's stated goal  Outcome: Progressing Towards Goal     Problem: Cath Lab Procedures: Post-Cath Day 1  Goal: Off Pathway (Use only if patient is Off Pathway)  Outcome: Progressing Towards Goal  Goal: Activity/Safety  Outcome: Progressing Towards Goal  Goal: Diagnostic Test/Procedures  Outcome: Progressing Towards Goal  Goal: Nutrition/Diet  Outcome: Progressing Towards Goal  Goal: Discharge Planning  Outcome: Progressing Towards Goal  Goal: Medications  Outcome: Progressing Towards Goal  Goal: Respiratory  Outcome: Progressing Towards Goal  Goal: Treatments/Interventions/Procedures  Outcome: Progressing Towards Goal  Goal: Psychosocial  Outcome: Progressing Towards Goal     Problem: Cath Lab Procedures: Discharge Outcomes  Goal: *Stable cardiac rhythm  Outcome: Progressing Towards Goal  Goal: *Hemodynamically stable  Outcome: Progressing Towards Goal  Goal: *Optimal pain control at patient's stated goal  Outcome: Progressing Towards Goal  Goal: *Pulses palpable, skin color within defined limits, skin temperature warm  Outcome: Progressing Towards Goal  Goal: *Lungs clear or at baseline  Outcome: Progressing Towards Goal  Goal: *Demonstrates ability to perform prescribed activity without shortness of breath or discomfort  Outcome: Progressing Towards Goal  Goal: *Verbalizes home exercise program, activity guidelines, cardiac precautions  Outcome: Progressing Towards Goal  Goal: *Verbalizes understanding and describes prescribed diet  Outcome: Progressing Towards Goal  Goal: *Verbalizes understanding and describes medication purposes and frequencies  Outcome: Progressing Towards Goal  Goal: *Identifies cardiac risk factors  Outcome: Progressing Towards Goal  Goal: *No signs and symptoms of infection or wound complications  Outcome: Progressing Towards Goal  Goal: *Anxiety reduced or absent  Outcome: Progressing Towards Goal  Goal: *Verbalizes and demonstrates incision care  Outcome: Progressing Towards Goal  Goal: *Understands and describes signs and symptoms to report to providers(Stroke Metric)  Outcome: Progressing Towards Goal  Goal: *Describes follow-up/return visits to physicians  Outcome: Progressing Towards Goal  Goal: *Describes available resources and support systems  Outcome: Progressing Towards Goal  Goal: *Influenza immunization  Outcome: Progressing Towards Goal  Goal: *Pneumococcal immunization  Outcome: Progressing Towards Goal     Problem: Patient Education: Go to Patient Education Activity  Goal: Patient/Family Education  Outcome: Progressing Towards Goal     Problem: Cardiac Valve Surgery: Day of Surgery/Post-Op (Initiate SCIP measures for post-op care)  Goal: Off Pathway (Use only if patient is Off Pathway)  Outcome: Progressing Towards Goal  Goal: Activity/Safety  Outcome: Progressing Towards Goal  Goal: Consults, if ordered  Outcome: Progressing Towards Goal  Goal: Diagnostic Test/Procedures  Outcome: Progressing Towards Goal  Goal: Nutrition/Diet  Outcome: Progressing Towards Goal  Goal: Medications  Outcome: Progressing Towards Goal  Goal: Respiratory  Outcome: Progressing Towards Goal  Goal: Treatments/Interventions/Procedures  Outcome: Progressing Towards Goal  Goal: Psychosocial  Outcome: Progressing Towards Goal  Goal: *Hemodynamically stable (eg: Cardiac output/index; pulmonary arterial pressures; mixed venous oxygen saturation within set parameters)  Outcome: Progressing Towards Goal  Goal: *Lungs clear or at baseline (eg: ABG's; oxygen saturation; end-tidal CO2  within set parameters)  Outcome: Progressing Towards Goal  Goal: *Valve type identified  Outcome: Progressing Towards Goal  Goal: *Stable cardiac rhythm  Outcome: Progressing Towards Goal  Goal: *Afebrile  Outcome: Progressing Towards Goal  Goal: *Follows simple commands post anesthesia  Outcome: Progressing Towards Goal  Goal: *Orients easily following extubation  Outcome: Progressing Towards Goal  Goal: *Optimal pain control at patient's stated goal  Outcome: Progressing Towards Goal     Problem: Cardiac Valve Surgery: Post-Op Day 1  Goal: Off Pathway (Use only if patient is Off Pathway)  Outcome: Progressing Towards Goal  Goal: Activity/Safety  Outcome: Progressing Towards Goal  Goal: Consults, if ordered  Outcome: Progressing Towards Goal  Goal: Diagnostic Test/Procedures  Outcome: Progressing Towards Goal  Goal: Nutrition/Diet  Outcome: Progressing Towards Goal  Goal: Medications  Outcome: Progressing Towards Goal  Goal: Respiratory  Outcome: Progressing Towards Goal  Goal: Treatments/Interventions/Procedures  Outcome: Progressing Towards Goal  Goal: Psychosocial  Outcome: Progressing Towards Goal  Goal: *Hemodynamically stable without vasoactive medications  Outcome: Progressing Towards Goal  Goal: *Stable cardiac rhythm  Outcome: Progressing Towards Goal  Goal: *Lungs clear or at baseline  Outcome: Progressing Towards Goal  Goal: *Mechanical ventilation discontinued  Outcome: Progressing Towards Goal  Goal: *Optimal pain control at patient's stated goal  Outcome: Progressing Towards Goal  Goal: *Demonstrates progressive activity  Outcome: Progressing Towards Goal  Goal: *Tolerating diet  Outcome: Progressing Towards Goal     Problem: Cardiac Valve Surgery: Post-Op Day 2  Goal: Off Pathway (Use only if patient is Off Pathway)  Outcome: Progressing Towards Goal  Goal: Activity/Safety  Outcome: Progressing Towards Goal  Goal: Consults, if ordered  Outcome: Progressing Towards Goal  Goal: Diagnostic Test/Procedures  Outcome: Progressing Towards Goal  Goal: Nutrition/Diet  Outcome: Progressing Towards Goal  Goal: Discharge Planning  Outcome: Progressing Towards Goal  Goal: Medications  Outcome: Progressing Towards Goal  Goal: Respiratory  Outcome: Progressing Towards Goal  Goal: Treatments/Interventions/Procedures  Outcome: Progressing Towards Goal  Goal: Psychosocial  Outcome: Progressing Towards Goal  Goal: *Hemodynamically stable without vasoactive medications  Outcome: Progressing Towards Goal  Goal: *Stable cardiac rhythm  Outcome: Progressing Towards Goal  Goal: *Lungs clear or at baseline  Outcome: Progressing Towards Goal  Goal: *Mechanical ventilation discontinued  Outcome: Progressing Towards Goal  Goal: *Optimal pain control at patient's stated goal  Outcome: Progressing Towards Goal  Goal: *Demonstrates progressive activity  Outcome: Progressing Towards Goal  Goal: *Tolerating diet  Outcome: Progressing Towards Goal  Goal: *Incisions without signs and symptoms of wound complication  Outcome: Progressing Towards Goal     Problem: Cardiac Valve Surgery: Post-Op Day 3  Goal: Off Pathway (Use only if patient is Off Pathway)  Outcome: Progressing Towards Goal  Goal: Activity/Safety  Outcome: Progressing Towards Goal  Goal: Diagnostic Test/Procedures  Outcome: Progressing Towards Goal  Goal: Nutrition/Diet  Outcome: Progressing Towards Goal  Goal: Discharge Planning  Outcome: Progressing Towards Goal  Goal: Medications  Outcome: Progressing Towards Goal  Goal: Respiratory  Outcome: Progressing Towards Goal  Goal: Treatments/Interventions/Procedures  Outcome: Progressing Towards Goal  Goal: Psychosocial  Outcome: Progressing Towards Goal  Goal: *Hemodynamically stable without vasoactive medications  Outcome: Progressing Towards Goal  Goal: *Stable cardiac rhythm  Outcome: Progressing Towards Goal  Goal: *Lungs clear or at baseline  Outcome: Progressing Towards Goal  Goal: *Optimal pain control at patient's stated goal  Outcome: Progressing Towards Goal  Goal: *Incisions without signs and symptoms of wound complication  Outcome: Progressing Towards Goal  Goal: *Demonstrates progressive activity  Outcome: Progressing Towards Goal  Goal: *Tolerating diet  Outcome: Progressing Towards Goal     Problem: Cardiac Valve Surgery: Post-Op Day 4  Goal: Off Pathway (Use only if patient is Off Pathway)  Outcome: Progressing Towards Goal  Goal: Activity/Safety  Outcome: Progressing Towards Goal  Goal: Diagnostic Test/Procedures  Outcome: Progressing Towards Goal  Goal: Nutrition/Diet  Outcome: Progressing Towards Goal  Goal: Discharge Planning  Outcome: Progressing Towards Goal  Goal: Medications  Outcome: Progressing Towards Goal  Goal: Respiratory  Outcome: Progressing Towards Goal  Goal: Treatments/Interventions/Procedures  Outcome: Progressing Towards Goal  Goal: Psychosocial  Outcome: Progressing Towards Goal  Goal: *Hemodynamically stable  Outcome: Progressing Towards Goal  Goal: *Stable cardiac rhythm  Outcome: Progressing Towards Goal  Goal: *Lungs clear or at baseline  Outcome: Progressing Towards Goal  Goal: *Optimal pain control at patient's stated goal  Outcome: Progressing Towards Goal  Goal: *Incisions without signs and symptoms of wound complication  Outcome: Progressing Towards Goal  Goal: *Ambulating and performing exercises with assistance  Outcome: Progressing Towards Goal  Goal: *Demonstrates progressive activity  Outcome: Progressing Towards Goal  Goal: *Tolerating diet  Outcome: Progressing Towards Goal     Problem: Cardiac Valve Surgery: Post-Op Day 5  Goal: Off Pathway (Use only if patient is Off Pathway)  Outcome: Progressing Towards Goal  Goal: Activity/Safety  Outcome: Progressing Towards Goal  Goal: Diagnostic Test/Procedures  Outcome: Progressing Towards Goal  Goal: Nutrition/Diet  Outcome: Progressing Towards Goal  Goal: Discharge Planning  Outcome: Progressing Towards Goal  Goal: Medications  Outcome: Progressing Towards Goal  Goal: Respiratory  Outcome: Progressing Towards Goal  Goal: Treatments/Interventions/Procedures  Outcome: Progressing Towards Goal  Goal: Psychosocial  Outcome: Progressing Towards Goal  Goal: *Hemodynamically stable  Outcome: Progressing Towards Goal  Goal: *Stable cardiac rhythm  Outcome: Progressing Towards Goal  Goal: *Lungs clear or at baseline  Outcome: Progressing Towards Goal  Goal: *Optimal pain control at patient's stated goal  Outcome: Progressing Towards Goal  Goal: *Incisions without signs and symptoms of wound complication  Outcome: Progressing Towards Goal  Goal: *Ambulating and performing exercises with assistance  Outcome: Progressing Towards Goal  Goal: *Demonstrates progressive activity  Outcome: Progressing Towards Goal  Goal: *Tolerating diet  Outcome: Progressing Towards Goal     Problem: Cardiac Valve Surgery: Post-Op Day 6  Goal: Off Pathway (Use only if patient is Off Pathway)  Outcome: Progressing Towards Goal  Goal: Activity/Safety  Outcome: Progressing Towards Goal  Goal: Diagnostic Test/Procedures  Outcome: Progressing Towards Goal  Goal: Nutrition/Diet  Outcome: Progressing Towards Goal  Goal: Discharge Planning  Outcome: Progressing Towards Goal  Goal: Medications  Outcome: Progressing Towards Goal  Goal: Respiratory  Outcome: Progressing Towards Goal  Goal: Treatments/Interventions/Procedures  Outcome: Progressing Towards Goal  Goal: Psychosocial  Outcome: Progressing Towards Goal     Problem: Cardiac Valve Surgery: Discharge Outcomes  Goal: *Hemodynamically stable  Outcome: Progressing Towards Goal  Goal: *Stable cardiac rhythm  Outcome: Progressing Towards Goal  Goal: *Lungs clear or at baseline  Outcome: Progressing Towards Goal  Goal: *Demonstrates ability to perform prescribed activity without shortness of breath or discomfort  Outcome: Progressing Towards Goal  Goal: *Verbalizes home exercise program, activity guidelines, cardiac precautions  Outcome: Progressing Towards Goal  Goal: *Optimal pain control at patient's stated goal  Outcome: Progressing Towards Goal  Goal: *Verbalizes understanding and describes prescribed diet  Outcome: Progressing Towards Goal  Goal: *Verbalizes name, dosage, time, side effects, and number of days to continue medications  Outcome: Progressing Towards Goal  Goal: *Weight  is stable  Outcome: Progressing Towards Goal  Goal: *No signs and symptoms of infection or wound complications  Outcome: Progressing Towards Goal  Goal: *Anxiety reduced or absent  Outcome: Progressing Towards Goal  Goal: *Verbalizes and demonstrates incision care  Outcome: Progressing Towards Goal  Goal: *Understands and describes signs and symptoms to report to providers(Stroke Metric)  Outcome: Progressing Towards Goal  Goal: *Describes follow-up/return visits to physicians  Outcome: Progressing Towards Goal  Goal: *Describes available resources and support systems  Outcome: Progressing Towards Goal  Goal: *Expresses feelings about diagnosis and surgery  Outcome: Progressing Towards Goal  Goal: *Influenza immunization  Outcome: Progressing Towards Goal  Goal: *Pneumococcal immunization  Outcome: Progressing Towards Goal     Problem: Patient Education: Go to Patient Education Activity  Goal: Patient/Family Education  Outcome: Progressing Towards Goal     Problem: Patient Education: Go to Patient Education Activity  Goal: Patient/Family Education  Outcome: Progressing Towards Goal     Problem: Pressure Injury - Risk of  Goal: *Prevention of pressure injury  Description: Document Sree Scale and appropriate interventions in the flowsheet. Outcome: Progressing Towards Goal  Note: Pressure Injury Interventions:  Sensory Interventions: Assess changes in LOC, Assess need for specialty bed, Avoid rigorous massage over bony prominences, Discuss PT/OT consult with provider, Keep linens dry and wrinkle-free, Maintain/enhance activity level, Minimize linen layers, Monitor skin under medical devices, Pad between skin to skin, Pressure redistribution bed/mattress (bed type), Turn and reposition approx.  every two hours (pillows and wedges if needed)    Moisture Interventions: Absorbent underpads, Apply protective barrier, creams and emollients, Limit adult briefs, Maintain skin hydration (lotion/cream), Minimize layers, Moisture barrier, Offer toileting Q_hr    Activity Interventions: Assess need for specialty bed, Increase time out of bed, Pressure redistribution bed/mattress(bed type), PT/OT evaluation    Mobility Interventions: Assess need for specialty bed, PT/OT evaluation, Turn and reposition approx. every two hours(pillow and wedges)    Nutrition Interventions: Document food/fluid/supplement intake    Friction and Shear Interventions: Lift sheet, Minimize layers, Transferring/repositioning devices, Foam dressings/transparent film/skin sealants                Problem: Patient Education: Go to Patient Education Activity  Goal: Patient/Family Education  Outcome: Progressing Towards Goal     Problem: Delirium Treatment  Goal: *Level of consciousness restored to baseline  Outcome: Progressing Towards Goal  Goal: *Level of environmental perceptions restored to baseline  Outcome: Progressing Towards Goal  Goal: *Sensory perception restored to baseline  Outcome: Progressing Towards Goal  Goal: *Emotional stability restored to baseline  Outcome: Progressing Towards Goal  Goal: *Functional assessment restored to baseline  Outcome: Progressing Towards Goal  Goal: *Absence of falls  Outcome: Progressing Towards Goal  Goal: *Will remain free of delirium, CAM Score negative  Outcome: Progressing Towards Goal  Goal: *Cognitive status will be restored to baseline  Outcome: Progressing Towards Goal  Goal: Interventions  Outcome: Progressing Towards Goal     Problem: Patient Education: Go to Patient Education Activity  Goal: Patient/Family Education  Outcome: Progressing Towards Goal     Problem: Airway Clearance - Ineffective  Goal: Achieve or maintain patent airway  Outcome: Progressing Towards Goal     Problem: Gas Exchange - Impaired  Goal: Absence of hypoxia  Outcome: Progressing Towards Goal  Goal: Promote optimal lung function  Outcome: Progressing Towards Goal     Problem: Breathing Pattern - Ineffective  Goal: Ability to achieve and maintain a regular respiratory rate  Outcome: Progressing Towards Goal     Problem:  Body Temperature -  Risk of, Imbalanced  Goal: Ability to maintain a body temperature within defined limits  Outcome: Progressing Towards Goal  Goal: Will regain or maintain usual level of consciousness  Outcome: Progressing Towards Goal  Goal: Complications related to the disease process, condition or treatment will be avoided or minimized  Outcome: Progressing Towards Goal     Problem: Isolation Precautions - Risk of Spread of Infection  Goal: Prevent transmission of infectious organism to others  Outcome: Progressing Towards Goal     Problem: Nutrition Deficits  Goal: Optimize nutrtional status  Outcome: Progressing Towards Goal     Problem: Risk for Fluid Volume Deficit  Goal: Maintain normal heart rhythm  Outcome: Progressing Towards Goal  Goal: Maintain absence of muscle cramping  Outcome: Progressing Towards Goal  Goal: Maintain normal serum potassium, sodium, calcium, phosphorus, and pH  Outcome: Progressing Towards Goal     Problem: Loneliness or Risk for Loneliness  Goal: Demonstrate positive use of time alone when socialization is not possible  Outcome: Progressing Towards Goal     Problem: Fatigue  Goal: Verbalize increase energy and improved vitality  Outcome: Progressing Towards Goal     Problem: Patient Education: Go to Patient Education Activity  Goal: Patient/Family Education  Outcome: Progressing Towards Goal     Problem: Risk for Spread of Infection  Goal: Prevent transmission of infectious organism to others  Description: Prevent the transmission of infectious organisms to other patients, staff members, and visitors.   Outcome: Progressing Towards Goal     Problem: Patient Education:  Go to Education Activity  Goal: Patient/Family Education  Outcome: Progressing Towards Goal     Problem: Patient Education: Go to Patient Education Activity  Goal: Patient/Family Education  Outcome: Progressing Towards Goal

## 2022-12-06 NOTE — PROGRESS NOTES
0815  ambulated up to chair wit 1 assist.   0830 eating breakfast.     1130 ambulating in hallway with PT/OT.

## 2022-12-06 NOTE — PROGRESS NOTES
TRANSFER - IN REPORT:    Verbal report received from [address] [place] [address] RN(name) on Dorothea Staff  being received from CCU(unit) for routine progression of care      Report consisted of patients Situation, Background, Assessment and   Recommendations(SBAR). Information from the following report(s) SBAR, Kardex, Recent Results, and Cardiac Rhythm SR  was reviewed with the receiving nurse. Opportunity for questions and clarification was provided. Assessment completed upon patients arrival to unit and care assumed. 4:20 PM   Pt transported via wheelchair to room 2271 A&O,denies pain, VSS on RA. Dual skin assessment completed with Dong SAUL. Mid Sternal incision, dermabond CDI, pacer wires taped to chest. Mother at bedside, pt sitting in chair bed alarm in place and call bell within reach. 5:00 PM  BS 92, text Lidnsey Pulliam NP,orders to hold 20U     6:25 PM  Pt ambulated in grider with walker. Pt dyspneic after 100ft. VSS, Sats 92%. Pt back in chair.  Bed alarm on, call bell within reach

## 2022-12-06 NOTE — PROGRESS NOTES
1915 (12/05/2022) - Bedside shift change report given to Kelsie Ramirez RN (oncoming nurse) by Emmy Natarajan RN (offgoing nurse). Report included the following information SBAR, Kardex, Procedure Summary, Intake/Output, MAR, Accordion, Recent Results, Med Rec Status, Cardiac Rhythm NSR, Sinus Tach 100s, and Alarm Parameters . 1920 - Shift assessment completed: see flow sheet for detail. Pt. Is A/V/O x4, cooperative, follow command. Currently in NSR; Vital signs are stable. 93% on RA; fine cracles heard in bilateral lower lobs; continue IV diuretic per order. Abdomen is seme-soft, distended, BS active. Skin is warm and dry; Sternotomy site is CDI. Pt. Is sitting in recliner, family at bedside. 2145 - Pt. Assisted to bed from recliner with assistant; tolerated transfer well.     2200 - Pt refused to use CPAP and request to use 2L NC for for sleep apnea. 2300 - Shift reassessment completed. 2355 - Pt. Voided 700 ml with urinal by sitting at bedside, clear yellow. 0300 - Shift reassessment completed. 0400 (12/06/2022) - Pt. Voided 600 ml with urinal, clear yellow. 0645 - Pt. Refused to go to recliner/chair at this time. Pt. Stated \"will go a little later\".

## 2022-12-06 NOTE — PROGRESS NOTES
Problem: Self Care Deficits Care Plan (Adult)  Goal: *Acute Goals and Plan of Care (Insert Text)  Description:     FUNCTIONAL STATUS PRIOR TO ADMISSION: Patient was independent and active without use of DME.    HOME SUPPORT: The patient lived with his mother. Occupational Therapy Goals  Re-evaluation 12/2/2022  1. Patient will perform grooming sitting edge of bed or unsupported in chair with mod I after set up within 7 day(s). 2.  Patient will tolerate > or = 5 minutes static standing for functional task without UE support within 7 day(s). 3.  Patient will follow move in the tube precaution for bed mobility, sit to stand, stand to sit with 1-2 verbal cues within 7 day(s). 4.  Patient will transfer to and from toilet and complete all toileting tasks with CGA to minimal assist within 7 day(s). 5.  Patient will perform cardiac exercises with handout and min cues within 7 day(s). Initiated 11/23/2022  1. Patient will perform grooming standing at sink with supervision/set-up within 7 day(s). 2.  Patient will perform upper body dressing with supervision/set-up within 7 day(s). 3.  Patient will perform lower body dressing with supervision/set-up within 7 day(s). 4.  Patient will perform toilet transfers with supervision/set-up within 7 day(s). 5.  Patient will perform all aspects of toileting with supervision/set-up within 7 day(s).          Outcome: Progressing Towards Goal    OCCUPATIONAL THERAPY TREATMENT  Patient: Maikel Ruvalcaba (22 y.o. male)  Date: 12/6/2022  Diagnosis: Acute non-Q wave non-ST elevation myocardial infarction (NSTEMI) ([facility]) [I21.4]  Aortic stenosis [I35.0]  CAD (coronary artery disease) [I25.10] <principal problem not specified>  Procedure(s) (LRB):  SYLWIA BY DR Neah Holloway -  CORONARY ARTERY BYPASS GRAFT  X 1 WITH LEFT INTERNAL MAMMARY ARTERY GRAFTING - AORTIC VALVE REPLACEMENT WITH MEYER 25MM INSPIRIS RESILIA TISSUE VALVE AND REPAIR OF ASCENDING AORTIC ANEURYSM (N/A) 14 Days Post-Op  Precautions: Sternal (move in the tube)  Chart, occupational therapy assessment, plan of care, and goals were reviewed. ASSESSMENT  Patient continues to progress slowly, but remains limited by his sternal/move in the tube precautions, decreased safety awareness, decreased standing balance, GW, B shoulder weakness, decreased B fine motor coordination and decreased activity tolerance which is impairing his functional independence. He is demonstrating improving adherence to his sternal/move in the tube precautions, but does require occasional cueing during UB dressing and for hand placement during transfers. Overall he was min A for transfers, CGA for ambulation with a RW and he was supervision/setup to mod A for dressing ADLs performed. Patient mainly having difficulty with UB dressing due to not having the strength in B shoulders to reach overhead. At this time he continues to benefit from acute OT and will need inpatient rehab after discharge. PLAN :  Patient continues to benefit from skilled intervention to address the above impairments. Continue treatment per established plan of care. to address goals. Recommendation for discharge: (in order for the patient to meet his/her long term goals)  Therapy 3 hours per day 5-7 days per week           OBJECTIVE DATA SUMMARY:   Cognitive/Behavioral Status:  Neurologic State: Alert  Orientation Level: Oriented X4  Cognition: Follows commands; Appropriate for age attention/concentration        Safety/Judgement: Awareness of environment; Insight into deficits    Functional Mobility and Transfers for ADLs:  Bed Mobility:  Scooting: Supervision    Transfers:  Sit to Stand: Minimum assistance (with first attempting demonstrating LOB posteriorly requiring mod A to regain balance; CGA with second attempt)        Ambulated with a rollator and CGA.    Balance:  Sitting: Intact  Standing: Impaired  Standing - Static: Good;Constant support (fair unsupported)  Standing - Dynamic : Good;Constant support (fair unsupported)    ADL Intervention:  Upper Body Dressing Assistance  Pullover Shirt: Moderate assistance; Compensatory technique training  Cues: Tactile cues provided;Verbal cues provided;Visual cues provided    Lower Body Dressing Assistance  Socks: Supervision;Set-up  Shoes with Cloth Laces: Minimum assistance; Compensatory technique training  Position Performed: Seated in chair  Cues: Verbal cues provided         Cognitive Retraining  Safety/Judgement: Awareness of environment; Insight into deficits    Therapeutic Exercises:      CARDIAC  EXERCISE   Sets   Reps   Active Active Assist   Passive Self ROM   Comments   Shoulder flexion 1 10 []                                            [x]                                            []                                            []                                               Shoulder abduction 1 10 []                                            [x]                                            []                                            []                                               Scapular elevation 1 10 [x]                                            []                                            []                                            []                                               Scapular retraction 1 10 [x]                                            []                                            []                                            []                                               Trunk rotation 1 10 [x]                                            []                                            []                                            []                                               Trunk sidebending 1 10 [x]                                            []                                            []                                            [] []                                            []                                            []                                            []                                                    Pain:  Sternal incision pain    Activity Tolerance:   Fair  Please refer to the flowsheet for vital signs taken during this treatment.     After treatment patient left in no apparent distress:   Sitting in chair and Call bell within reach    COMMUNICATION/COLLABORATION:   The patients plan of care was discussed with: Physical Therapist and Registered Nurse    Familia Sic, OTR/L  Time Calculation: 43 mins

## 2022-12-06 NOTE — DIABETES MGMT
[facility]    CLINICAL NURSE SPECIALIST CONSULT     Initial Presentation   Fernando Ochoa is a 36 y.o. male admitted 11/15/22 after experiencing chest pain. Afebrile. Hypertensive. 02 sats 100%  LAB: WBC 8.7. Normal H&H. /AG 3. Normal kidney & liver parameters. NT pro-; troponin 714  CXR:  Mild interstitial pulmonary edema versus interstitial pneumonia. No   pneumothorax. Consider PA and lateral chest views when the patient can better   tolerate. HX:   Past Medical History:   Diagnosis Date    Anxiety and depression     anxiety, depression    Bleeding of eye, left     8/17/21 pt reports receiving injections in right eye for beeding    Chronic headaches 2007    COVID-19 12/20/2020    Diabetes type 1, uncontrolled     since 8years old    GERD (gastroesophageal reflux disease)     Hypercholesterolemia     Hypertension     Hypothyroidism     MI (myocardial infarction) ([facility] [address]     as of 8/17/21 pt reports 9 stents total    WALLY on CPAP       INITIAL DX:   Acute non-Q wave non-ST elevation myocardial infarction (NSTEMI) ([facility] [address] [I21.4]  Aortic stenosis [I35.0]  CAD (coronary artery disease) [I25.10]     Current Treatment     TX: 11/22/22 On pump CORONARY ARTERY BYPASS GRAFT  X 1 WITH LEFT INTERNAL MAMMARY ARTERY to LAD  AORTIC VALVE REPLACEMENT WITH MEYER 25MM INSPIRIS RESILIA TISSUE VALVE   REPAIR OF ASCENDING AORTIC ANEURYSM with 26mm hemashield graft    Consulted by Provider for advanced diabetes nursing assessment and care for:   [x] Transitioning off Clarissa    [x] Inpatient management strategy  [] Home management assessment  [] Survival skill education    Hospital Course   Clinical progress has been complicated by need for ICU level of care. 11/24/22 Overnight developed hypoxemia. Underwent bronch and SYLWIA, neither of which explained cause for hypoxemia. He required re-intubation and was put on veletri. 11/25/22 Intermittent desaturation events. CXR this a.m. with pulmonary edema  11/27/22 Concern for hepatic ischemia/infarction with progressive trnasaminitis. GI consulted determines severe hepatitis  11/29/22 Sedated on Precedex & Propofol. On C vent support Fi02 50%/Peep 8. Trying to transition off epi to baldev infusions. Will restart Tfs. NG 2 suction at the moment. Plan on diuresing  11/30/22 Follows commands with cough & gag+. On AC vent support Fi02 50%/Peep 8. On Fentanyl infusion; Propofol being weaned. BP managed with epi & baldev infusions. NG to suction & drawing 500cc over past day. KKUB revealed prominent fecal status. Receiving Miralax & Senna; no BM for 14 days. 12/1/22 Anxious. BP & HR up. Plan to extubate this morning. On Spon vent support Fi02 40%/Peep 5. Lots of oral secretions. Remains on baldev & epi infusions fo BP management. NG clamped. Will receive enema today for fecal stasis. 12/2/22 Alert & oriented. Precedex no longer needed for anxiety; will be stopped. O2 via MF. NG remains in place. Plans for Speech evaluation to move to oral feeding. In the meantime, a Dobhoff may be indicated with initial feedings at trickle rate. PT will be started as well. 12/5/22 Alert & oriented. Afebrile. On MF 02; no CPAP overnight. CXR:   Stable cardiac silhouette, prior sternotomy. No acute infiltrate or   shift. Removal of central line. Eating first true meal this morning; ate everything. OOB with assistance & less dizziness per nursing. Moving bowels per patient. 12/6/22 Alert & oriented. Afebrile. On room air. Eating well. OOB to chair.      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 Max daily dose of 150 units. Diabetes self-management practices:   Eating pattern - Eats 3 meals on most days and snacks in the evening   [x] Not eating a carbohydrate-controlled mealplan  Physical activity pattern   [x] Not employing a physical activity program to control BG  Monitoring pattern   [x] Testing BGs   [x] Breakfast 300s  [x] Lunch  100-200s  [x] Dinner  100-200s  Taking medications pattern  [x] Consistent administration  [x] Affordable  Overall evaluation:    [x] Not achieving A1c target with drug therapy & self-care practices    Subjective   \"This is my first real meal.\"     Objective   Physical exam  General Obese male in no acute distress  Neuro  Alert & oriented  Vital Signs Visit Vitals  /66   Pulse (!) 103   Temp 98.1 °F (36.7 °C)   Resp 25   Ht 5' 9\" (1.753 m)   Wt 114.7 kg (252 lb 13.9 oz)   SpO2 98%   BMI 37.34 kg/m²     Laboratory  Recent Labs     12/06/22  0326 12/05/22  0628 12/04/22  0303   * 118* 84   AGAP 7 6 6   WBC 8.7 10.3 12.5*   CREA 1.00 0.86 0.98   AST 36 46* 80*   * 174* 248*     Factors impacting BG management  Factor Dose Comments   Nutrition:  Easy to Chew   60 gram CHO meals   Eating well   Drugs:  Atypical antipsychotics   Buspar 3XB. Zoloft D      Pain Oxycodone PRN    Infection  Afebrile.  WBC normalized   Other:   Kidney function  Liver function   SERGE resolved  AST/ALT elevated but continues to trend down     Acute Severe Hepatitis per GI     Blood glucose pattern      Significant diabetes-related events over the past 24-72 hours  11/15/22 Admission   Total insulin requirements without achieving target Bgs until 11/20/22:    11/21/22 Started on Gutierrez Reasons  11/23/22 Using 5-10 units/hr of insulin this morning  11/28/22 Using 2-6 units/hr of insulin  11/29/22 Continues to use 3-6 units/hr of insulin  11/30/22 Using 3.5 units/hr => transitioned off GS  12/1/22 Bgs rising into 200-300s  12/2/22 Change in basal insulin dosing has brought Bgs to goal  12/4/22 Hypoglycemia in the AM  12/6/22 . Eating much better over past 24 hours    Assessment and Plan   Nursing Diagnosis Risk for unstable blood glucose pattern   Nursing Intervention Domain 5256 Decision-making Support   Nursing Interventions Examined current inpatient diabetes/blood glucose control   Explored factors facilitating and impeding inpatient management  Explored corrective strategies with patient and responsible inpatient provider   Informed patient of rational for insulin strategy while hospitalized     Evaluation   This 36year old  male with known CAD was admitted with chest pain; he underwent CABG & AVR. Patient has known Type 1 diabetes with poor control going into this surgery. Patient required 260 units/D prior to the surgery (during this admission) without consistently achieving BG targets. Had been on Gutierrez Reasons since surgery 11/22/22; also required vent & pressor support. Abdominal distention related to feces finally resolved with bowel regimen. Extubated to MidFlow 02; now on room air. Not eating well over the weekend but now eating consistently. It is clear his basal insulin needs are less than prior to admission; basal dose reduced. Now eating well. Recommend adding mealtime insulin. PerFect Served with [facility] NP.     Recommendations     [x] Continue Lantus insulin to 60 units twice daily    [x] Add Humalog insulin 20 units at meals    Billing Code(s)   [x] [id number] IP subsequent hospital care - 35 minutes     Before making these care recommendations, I personally reviewed the hospitalization record, including notes, laboratory & diagnostic data and current medications, and examined the patient at the bedside (circumstances permitting) before making care recommendations. More than fifty (50) percent of the time was spent in patient counseling and/or care coordination.   Total minutes: Neli Schultz, CNS  Diabetes Clinical Nurse Specialist  [facility]  Access via [address] [place] [address]

## 2022-12-06 NOTE — PROGRESS NOTES
Bedside shift change report given to Clay Lanza RN (oncoming nurse) by Tc Jimenez RN (offgoing nurse). Report included the following information SBAR, Kardex, Procedure Summary, Intake/Output, MAR, Accordion, Recent Results, Med Rec Status, Cardiac Rhythm NSR, Sinus Tach 100s, and Alarm Parameters .

## 2022-12-06 NOTE — PROGRESS NOTES
Infectious Disease Progress    Impression    Fevers, leukocytosis resolved   Afebrile  WBC 10.3  Negative septic work-up. S/p acute hypoxic respiratory failure  S/p extubation 12/1  Patient encouraged to take deep inspirations    Acute transaminitis   Improving    Hepatic steatosis   ? Cholestasis cause of LFTs    CAD, STEMI s/p CABG  Severe AS s/p AVR  S/p surgery 11/22    Poorly controlled diabetes  A1c 9    SERGE  Resolved Cr .86    COVID rapid positive  PCR negative  11/24, 12/3. Obesity BMI 37.34    Lines- PIV- 12/4    Plan  Completed meropenem x 7 days  S/p change out IV lines, removal of pennington  Continue I-S  Will sign off, please reconsult as needed. Patient seen in ICU. Resting, arousable. Afebrile.   Normal WBC  D/w ICU team    Active Hospital Problems    Diagnosis Date Noted    Aortic stenosis 11/22/2022    CAD (coronary artery disease) 11/22/2022    S/P CABG x 1 11/22/2022     On pump CORONARY ARTERY BYPASS GRAFT  X 1 WITH LEFT INTERNAL MAMMARY ARTERY to LAD  AORTIC VALVE REPLACEMENT WITH MEYER 25MM INSPIRIS RESILIA TISSUE VALVE   REPAIR OF ASCENDING AORTIC ANEURYSM with 26mm hemashield graft      S/P AVR (aortic valve replacement) and aortoplasty 11/22/2022     On pump CORONARY ARTERY BYPASS GRAFT  X 1 WITH LEFT INTERNAL MAMMARY ARTERY to LAD  AORTIC VALVE REPLACEMENT WITH MEYER 25MM INSPIRIS RESILIA TISSUE VALVE   REPAIR OF ASCENDING AORTIC ANEURYSM with 26mm hemashield graft      Acute non-Q wave non-ST elevation myocardial infarction (NSTEMI) ([facility] [address] 11/15/2022         Past Medical History:   Diagnosis Date    Anxiety and depression     anxiety, depression    Bleeding of eye, left     8/17/21 pt reports receiving injections in right eye for beeding    Chronic headaches 2007    COVID-19 12/20/2020    Diabetes type 1, uncontrolled     since 8years old    GERD (gastroesophageal reflux disease)     Hypercholesterolemia     Hypertension     Hypothyroidism     MI (myocardial infarction) ([address] as of 21 pt reports 9 stents total    WALLY on CPAP        Past Surgical History:   Procedure Laterality Date    HX CARPAL TUNNEL RELEASE Left     HX CARPAL TUNNEL RELEASE Right     CTE trigger thumb and index finger    HX HEART CATHETERIZATION      HX HEENT      HX LAP CHOLECYSTECTOMY  14    Dr Ghulam Jackman    HX WISDOM TEETH EXTRACTION         Allergies   Allergen Reactions    Gabapentin Swelling     Of feet    Morphine Nausea and Vomiting    Penicillin G Swelling    Percocet [Oxycodone-Acetaminophen] Hives and Nausea and Vomiting     Pt is allergic to Oxycodone, has previously tolerated Tylenol and Hydrocodone. Sulfa (Sulfonamide Antibiotics) Swelling    Tramadol Nausea Only     Nausea and headache         Social Connections: Not on file       Family Status   Relation Name Status    Mother  Alive    Father      PAunt  Alive    MGM      MGF      [address]      PGF         Medication Documentation Review Audit       Reviewed by Jeevan Holcomb RN (Registered Nurse) on 22 at [id number]      Medication Sig Documenting Provider Last Dose Status Taking? alum-mag hydroxide-simeth (MYLANTA) 200-200-20 mg/5 mL susp Take 30 mL by mouth every four (4) hours as needed for Indigestion. Min-Venditti, Cherylle Baumgarten, MD 11/15/2022 Active Yes   aspirin delayed-release 81 mg tablet Take 1 Tab by mouth daily. Regine Verduzco MD 2022 Active Yes   busPIRone (BUSPAR) 15 mg tablet Take 1 Tablet by mouth three (3) times daily. Bridie Valdes MD 2022 Active Yes   butalbital-acetaminophen-caffeine (FIORICET, ESGIC) -40 mg per tablet Take 1 Tablet by mouth every six (6) hours as needed for Headache. Indications: a migraine headache CESAR Maxwell 10/16/2022 Active Yes   cholecalciferol (Vitamin D3) (5000 Units/125 mcg) tab tablet Take 1 Tab by mouth daily. Morgan Dumas MD 11/15/2022 Active Yes   clopidogreL (PLAVIX) 75 mg tab Take 1 Tablet by mouth daily. Jose Capps MD 11/16/2022 Active Yes   esomeprazole (NEXIUM) 40 mg capsule TAKE 1 CAPSULE BY MOUTH ONCE DAILY BEFORE BREAKFAST DO NOT OPEN CAPSULE Provider, Historical 11/15/2022 Active Yes   famotidine (PEPCID) 40 mg tablet Take 1 Tablet by mouth daily. Nahum Ovalle MD 11/9/2022 Active Yes   insulin glargine U-300 conc (Toujeo Max U-300 SoloStar) 300 unit/mL (3 mL) inpn Take 140 units every day (new dosage) Nellie Fonseca MD 11/9/2022 Active Yes   Insulin Needles, Disposable, 32 gauge x 5/32\" ndle 5 shots per day (dispense whatever brand is covered by his insurance) Nellie Fonseca MD 11/9/2022 Active Yes   insulin regular (NOVOLIN R, HUMULIN R) 100 unit/mL injection 40 units with each meal, plus correction. Max daily dose of 150 units. Nellie Fonseca MD 11/9/2022 Active Yes   Insulin Syringe-Needle U-100 (BD Insulin Syringe) 1 mL 25 x 1\" syrg Use for drawing up/injecting testosterone once weekly. Nellie Fonseca MD 11/9/2022 Active Yes   insulin U-500 syringe-needle (BD Insulin Syringe U-500) 1/2 mL 31 gauge x 15/64\" syrg Three shots per day. Nellie Fonseca MD  Active    levothyroxine (SYNTHROID) 125 mcg tablet Take 1 Tablet by mouth Daily (before breakfast). Nellie Fonseca MD 11/15/2022 Active Yes   lisinopriL (PRINIVIL, ZESTRIL) 10 mg tablet Take 10 mg by mouth daily. Provider, Historical 11/16/2022 Active Yes   LORazepam (ATIVAN) 1 mg tablet Take 1 Tablet by mouth every eight (8) hours as needed for Anxiety. Max Daily Amount: 3 mg. Radha Zamorano NP 11/9/2022 Active Yes   metFORMIN (GLUCOPHAGE) 500 mg tablet TAKE 1 TABLET BY MOUTH TWICE DAILY WITH MEALS Nellie Fonseca MD 11/9/2022 Active Yes   metoprolol succinate (TOPROL-XL) 25 mg XL tablet Take 25 mg by mouth two (2) times a day. Provider, Historical 11/15/2022 Active Yes   nitroglycerin (NITROSTAT) 0.4 mg SL tablet Take 1 Tab by mouth every five (5) minutes as needed for Chest Pain.  Sit down then put one tab under the tongue every 5 minutes as needed Celina Maddox MD 11/15/2022 Active Yes   rosuvastatin (CRESTOR) 40 mg tablet Take 40 mg by mouth daily. Provider, Pratima 11/16/2022 Active Yes   sertraline (ZOLOFT) 100 mg tablet Take 1 Tablet by mouth daily. Kay Sanchez NP 11/16/2022 Active Yes   sertraline (ZOLOFT) 50 mg tablet Take 1 Tablet by mouth daily. Kay Sanchez NP 11/16/2022 Active Yes   testosterone (ANDROGEL) 20.25 mg/1.25 gram (1.62 %) gel Apply 4 pump presses daily. Ok to dispense generic testosterone. Edvin Mabry MD 10/16/2022 Active Yes                      Review of Systems - Negative except those noted in H&P      PHYSICAL EXAM:  General:          Awake, cooperative, no acute distress    EENT:              EOMI. Anicteric sclerae. MMM  Resp:               CTA bilaterally, no wheezing or rales. No accessory muscle use  CV:                  Regular  rhythm,  No edema  GI:                   Soft, Non distended, Non tender. +Bowel sounds  Neurologic:      Alert and oriented X 3, normal speech,   Psych:             Good insight. Not anxious nor agitated  Skin:                No rashes. No jaundice. Extremities: No clubbing cyanosis edema.     Cj Bright MD [address]

## 2022-12-06 NOTE — PROGRESS NOTES
Problem: Mobility Impaired (Adult and Pediatric)  Goal: *Acute Goals and Plan of Care (Insert Text)  Description: FUNCTIONAL STATUS PRIOR TO ADMISSION: Patient was independent and active without use of DME. Still driving. Denies home O2 use. States he is active at baseline, performing yard work and working part-time as a . HOME SUPPORT PRIOR TO ADMISSION: The patient lived with mother. Physical Therapy Goals  Revised 12/2/2022  1. Patient will move from supine to sit and sit to supine  in bed with supervision/set-up within 7 days. 2.  Patient will perform sit to/from stand with CGA within 7 days. 3.  Patient will ambulate 200 feet with least restrictive assistive device and contact guard assist within 7 days. 4.  Patient will ascend/descend 3 stairs with 1 handrail(s) with contact guard assist within 7 days. 5.  Patient will perform cardiac exercises per protocol with independence within 7 days. 6.  Patient will verbally recall and functionally demonstrate mindful-based movements (\"move in the tube\") principles without cues within 7 days. Physical Therapy Goals  Initiated 11/23/2022  1. Patient will move from supine to sit and sit to supine , scoot up and down, and roll side to side in bed with modified independence within 5 days. 2.  Patient will perform sit to/from stand with modified independence within 5 days. 3.  Patient will ambulate 300 feet with least restrictive assistive device and independence within 5 days. 4.  Patient will ascend/descend 3 stairs with 1 handrail(s) with modified independence within 5 days. 5.  Patient will perform cardiac exercises per protocol with independence within 5 days. 6.  Patient will verbally and functionally demonstrate 3/3 sternal precautions without cues within 5 days.          Outcome: Progressing Towards Goal   PHYSICAL THERAPY TREATMENT  Patient: Darshana Cardenas (89 y.o. male)  Date: 12/6/2022  Diagnosis: Acute non-Q wave non-ST elevation myocardial infarction (NSTEMI) ([facility]) [I21.4]  Aortic stenosis [I35.0]  CAD (coronary artery disease) [I25.10] <principal problem not specified>  Procedure(s) (LRB):  SYLWIA BY DR David Cedeno -  CORONARY ARTERY BYPASS GRAFT  X 1 WITH LEFT INTERNAL MAMMARY ARTERY GRAFTING - AORTIC VALVE REPLACEMENT WITH MEYER 25MM INSPIRIS RESILIA TISSUE VALVE AND REPAIR OF ASCENDING AORTIC ANEURYSM (N/A) 14 Days Post-Op  Precautions: Sternal (move in the tube)  Chart, physical therapy assessment, plan of care and goals were reviewed. ASSESSMENT  Patient continues with skilled PT services and is progressing towards goals. Patient sitting in chair upon arrival and agreeable to therapy. Patient demonstrating improved ability to actively move B UEs but remain weak and generally decreased which also impairs fine motor coordination. Patient requiring min A with initial sit to stand demonstrating poor initial standing balance with significant posterior LOB requiring mod A to regain balance. Unsupported static standing balance is fair and unsupported dynamic standing balance is fair to poor. Patient demonstrates poor ability to march in place unsupported with LOB to R. He is unable to stand on one foot without support and turning is very guarded requiring close supervision. Patient ambulated 20 feet in room without use of AD- gait is is unsteady demonstrating frequent mild balance checks. He transitioned to rollator with improvements in overall gait stability but cipriano remains non-functional.  Patient is well below his functional baseline and demonstrates increased risk of falls secondary to balance impairments. Patient is an excellent inpatient rehab candidate following discharge from hospital as he is able to tolerate 3 hours of intensive therapy, was completely independent prior to hospitalization and has good family support.      Patient is verbalizing and is demonstrating understanding of mindful-based movements (\"move in the tube\") principles of keeping UEs proximal to ribcage to prevent lateral pull on the sternum during load-bearing activities with verbal and tactile cues required for compliance. Current Level of Function Impacting Discharge (mobility/balance): CGA to mod A    Other factors to consider for discharge: impaired balance         PLAN :  Patient continues to benefit from skilled intervention to address the above impairments. Continue treatment per established plan of care. to address goals. Recommendation for discharge: (in order for the patient to meet his/her long term goals)  Therapy 3 hours per day 5-7 days per week    This discharge recommendation:  Has been made in collaboration with the attending provider and/or case management    IF patient discharges home will need the following DME: to be determined (TBD); may need rollator        SUBJECTIVE:   Patient stated My balance is off but my feet are swollen.     OBJECTIVE DATA SUMMARY:   Patient mobilized on continuous portable monitor/telemetry.   Critical Behavior:  Neurologic State: Alert  Orientation Level: Oriented X4  Cognition: Appropriate decision making, Appropriate for age attention/concentration, Appropriate safety awareness, Follows commands  Safety/Judgement: Awareness of environment, Insight into deficits, Good awareness of safety precautions    Functional Mobility Training:  Bed Mobility:         Deferred; patient sitting in chair upon arrival             Transfers:  Sit to Stand: Minimum assistance (with first attempt using rocking method demonstrating LOB posteriorly requiring mod A to regain balance; CGA with second attempt using \"nose over toes\" method)  Stand to Sit: Contact guard assistance                               Balance:  Sitting: Intact  Standing: Impaired  Standing - Static: Good;Constant support (fair unsupported)  Standing - Dynamic : Good;Constant support (fair unsupported)    Ambulation/Gait Training:  Distance (ft): 125 Feet (ft)  Assistive Device: Walker, rollator;Gait belt (ambulated 20 feet without use of AD and gait is unsteady)  Ambulation - Level of Assistance: Contact guard assistance        Gait Abnormalities: Decreased step clearance        Base of Support: Narrowed     Speed/Cipriano: Pace decreased (<100 feet/min); Slow (non-functional cipriano)  Step Length: Left shortened;Right shortened      Patient ambulated 20 feet in room without use of AD- gait is unsteady demonstrating several balance checks to R. Transitioned to rollator and gait remains mildly unsteady but no overt LOB or balance checks noted         Cardiac diagnosis intervention:  Patient instructed and educated on mindful movement principles based on Move in The Tube concept to include maintaining bilateral elbows close to rib cage when performing any load-bearing activity such as getting in/out of bed, pushing up from a chair, opening a door, or lifting a box. Patient was given a handout with diagrams of each correct/incorrect method of performing each of the above tasks. Therapeutic Exercises:   Deferred to OT        Activity Tolerance:   Good, SpO2 stable on RA, and requires rest breaks    After treatment patient left in no apparent distress:   Sitting in chair and Call bell within reach    COMMUNICATION/COLLABORATION:   The patients plan of care was discussed with: Occupational therapist, Registered nurse, and Cardiac NP .      Akanksha Barriga, PT   Time Calculation: 35 mins

## 2022-12-07 ENCOUNTER — APPOINTMENT (OUTPATIENT)
Dept: GENERAL RADIOLOGY | Age: 42
End: 2022-12-07
Attending: NURSE PRACTITIONER
Payer: COMMERCIAL

## 2022-12-07 LAB
ALBUMIN SERPL-MCNC: 3.1 G/DL (ref 3.5–5)
ALBUMIN/GLOB SERPL: 0.8 {RATIO} (ref 1.1–2.2)
ALP SERPL-CCNC: 190 U/L (ref 45–117)
ALT SERPL-CCNC: 124 U/L (ref 12–78)
ANION GAP SERPL CALC-SCNC: 4 MMOL/L (ref 5–15)
AST SERPL-CCNC: 49 U/L (ref 15–37)
BASOPHILS # BLD: 0.2 K/UL (ref 0–0.1)
BASOPHILS NFR BLD: 2 % (ref 0–1)
BILIRUB SERPL-MCNC: 0.7 MG/DL (ref 0.2–1)
BUN SERPL-MCNC: 19 MG/DL (ref 6–20)
BUN/CREAT SERPL: 18 (ref 12–20)
CALCIUM SERPL-MCNC: 9.1 MG/DL (ref 8.5–10.1)
CHLORIDE SERPL-SCNC: 96 MMOL/L (ref 97–108)
CO2 SERPL-SCNC: 32 MMOL/L (ref 21–32)
CREAT SERPL-MCNC: 1.07 MG/DL (ref 0.7–1.3)
DIFFERENTIAL METHOD BLD: ABNORMAL
EOSINOPHIL # BLD: 0.4 K/UL (ref 0–0.4)
EOSINOPHIL NFR BLD: 5 % (ref 0–7)
ERYTHROCYTE [DISTWIDTH] IN BLOOD BY AUTOMATED COUNT: 15.4 % (ref 11.5–14.5)
GLOBULIN SER CALC-MCNC: 3.8 G/DL (ref 2–4)
GLUCOSE BLD STRIP.AUTO-MCNC: 158 MG/DL (ref 65–117)
GLUCOSE BLD STRIP.AUTO-MCNC: 267 MG/DL (ref 65–117)
GLUCOSE BLD STRIP.AUTO-MCNC: 277 MG/DL (ref 65–117)
GLUCOSE BLD STRIP.AUTO-MCNC: 280 MG/DL (ref 65–117)
GLUCOSE BLD STRIP.AUTO-MCNC: 89 MG/DL (ref 65–117)
GLUCOSE SERPL-MCNC: 312 MG/DL (ref 65–100)
HCT VFR BLD AUTO: 33.9 % (ref 36.6–50.3)
HGB BLD-MCNC: 10.6 G/DL (ref 12.1–17)
IMM GRANULOCYTES # BLD AUTO: 0 K/UL (ref 0–0.04)
IMM GRANULOCYTES NFR BLD AUTO: 0 % (ref 0–0.5)
LYMPHOCYTES # BLD: 2.4 K/UL (ref 0.8–3.5)
LYMPHOCYTES NFR BLD: 28 % (ref 12–49)
MAGNESIUM SERPL-MCNC: 2.3 MG/DL (ref 1.6–2.4)
MCH RBC QN AUTO: 27.2 PG (ref 26–34)
MCHC RBC AUTO-ENTMCNC: 31.3 G/DL (ref 30–36.5)
MCV RBC AUTO: 87.1 FL (ref 80–99)
MONOCYTES # BLD: 0.9 K/UL (ref 0–1)
MONOCYTES NFR BLD: 10 % (ref 5–13)
NEUTS SEG # BLD: 4.8 K/UL (ref 1.8–8)
NEUTS SEG NFR BLD: 55 % (ref 32–75)
NRBC # BLD: 0 K/UL (ref 0–0.01)
NRBC BLD-RTO: 0 PER 100 WBC
PLATELET # BLD AUTO: 264 K/UL (ref 150–400)
PMV BLD AUTO: 9.7 FL (ref 8.9–12.9)
POTASSIUM SERPL-SCNC: 4.8 MMOL/L (ref 3.5–5.1)
PROT SERPL-MCNC: 6.9 G/DL (ref 6.4–8.2)
RBC # BLD AUTO: 3.89 M/UL (ref 4.1–5.7)
RBC MORPH BLD: ABNORMAL
SERVICE CMNT-IMP: ABNORMAL
SERVICE CMNT-IMP: NORMAL
SODIUM SERPL-SCNC: 132 MMOL/L (ref 136–145)
WBC # BLD AUTO: 8.7 K/UL (ref 4.1–11.1)

## 2022-12-07 PROCEDURE — 65270000046 HC RM TELEMETRY

## 2022-12-07 PROCEDURE — 74011250637 HC RX REV CODE- 250/637: Performed by: NURSE PRACTITIONER

## 2022-12-07 PROCEDURE — 83735 ASSAY OF MAGNESIUM: CPT

## 2022-12-07 PROCEDURE — 74011250636 HC RX REV CODE- 250/636: Performed by: NURSE PRACTITIONER

## 2022-12-07 PROCEDURE — 80053 COMPREHEN METABOLIC PANEL: CPT

## 2022-12-07 PROCEDURE — 97116 GAIT TRAINING THERAPY: CPT

## 2022-12-07 PROCEDURE — 97110 THERAPEUTIC EXERCISES: CPT

## 2022-12-07 PROCEDURE — 74011636637 HC RX REV CODE- 636/637: Performed by: NURSE PRACTITIONER

## 2022-12-07 PROCEDURE — 94640 AIRWAY INHALATION TREATMENT: CPT

## 2022-12-07 PROCEDURE — 97535 SELF CARE MNGMENT TRAINING: CPT | Performed by: OCCUPATIONAL THERAPIST

## 2022-12-07 PROCEDURE — 74011000250 HC RX REV CODE- 250: Performed by: NURSE PRACTITIONER

## 2022-12-07 PROCEDURE — 82962 GLUCOSE BLOOD TEST: CPT

## 2022-12-07 PROCEDURE — 36415 COLL VENOUS BLD VENIPUNCTURE: CPT

## 2022-12-07 PROCEDURE — 99231 SBSQ HOSP IP/OBS SF/LOW 25: CPT

## 2022-12-07 PROCEDURE — 74011250637 HC RX REV CODE- 250/637: Performed by: THORACIC SURGERY (CARDIOTHORACIC VASCULAR SURGERY)

## 2022-12-07 PROCEDURE — 85025 COMPLETE CBC W/AUTO DIFF WBC: CPT

## 2022-12-07 PROCEDURE — 71045 X-RAY EXAM CHEST 1 VIEW: CPT

## 2022-12-07 PROCEDURE — 97530 THERAPEUTIC ACTIVITIES: CPT | Performed by: OCCUPATIONAL THERAPIST

## 2022-12-07 RX ORDER — INSULIN LISPRO 100 [IU]/ML
20 INJECTION, SOLUTION INTRAVENOUS; SUBCUTANEOUS
Status: DISCONTINUED | OUTPATIENT
Start: 2022-12-07 | End: 2022-12-08

## 2022-12-07 RX ORDER — FUROSEMIDE 40 MG/1
40 TABLET ORAL DAILY
Status: DISCONTINUED | OUTPATIENT
Start: 2022-12-07 | End: 2022-12-09 | Stop reason: HOSPADM

## 2022-12-07 RX ORDER — INSULIN LISPRO 100 [IU]/ML
10 INJECTION, SOLUTION INTRAVENOUS; SUBCUTANEOUS
Status: DISCONTINUED | OUTPATIENT
Start: 2022-12-07 | End: 2022-12-07

## 2022-12-07 RX ADMIN — SODIUM CHLORIDE, PRESERVATIVE FREE 10 ML: 5 INJECTION INTRAVENOUS at 14:05

## 2022-12-07 RX ADMIN — SODIUM CHLORIDE, PRESERVATIVE FREE 10 ML: 5 INJECTION INTRAVENOUS at 21:03

## 2022-12-07 RX ADMIN — OXYCODONE 5 MG: 5 TABLET ORAL at 20:56

## 2022-12-07 RX ADMIN — POTASSIUM CHLORIDE 40 MEQ: 750 TABLET, FILM COATED, EXTENDED RELEASE ORAL at 08:41

## 2022-12-07 RX ADMIN — METOPROLOL TARTRATE 25 MG: 25 TABLET, FILM COATED ORAL at 20:53

## 2022-12-07 RX ADMIN — ASPIRIN 81 MG CHEWABLE TABLET 81 MG: 81 TABLET CHEWABLE at 08:41

## 2022-12-07 RX ADMIN — SERTRALINE 100 MG: 50 TABLET, FILM COATED ORAL at 08:41

## 2022-12-07 RX ADMIN — ENOXAPARIN SODIUM 30 MG: 100 INJECTION SUBCUTANEOUS at 20:52

## 2022-12-07 RX ADMIN — SODIUM CHLORIDE, PRESERVATIVE FREE 10 ML: 5 INJECTION INTRAVENOUS at 06:30

## 2022-12-07 RX ADMIN — INSULIN GLARGINE 60 UNITS: 100 INJECTION, SOLUTION SUBCUTANEOUS at 12:04

## 2022-12-07 RX ADMIN — PANTOPRAZOLE SODIUM 40 MG: 40 TABLET, DELAYED RELEASE ORAL at 08:42

## 2022-12-07 RX ADMIN — OXYCODONE 5 MG: 5 TABLET ORAL at 08:41

## 2022-12-07 RX ADMIN — FUROSEMIDE 40 MG: 40 TABLET ORAL at 11:42

## 2022-12-07 RX ADMIN — LEVOTHYROXINE SODIUM 150 MCG: 0.07 TABLET ORAL at 06:30

## 2022-12-07 RX ADMIN — METOPROLOL TARTRATE 25 MG: 25 TABLET, FILM COATED ORAL at 08:41

## 2022-12-07 RX ADMIN — IPRATROPIUM BROMIDE AND ALBUTEROL SULFATE 3 ML: 2.5; .5 SOLUTION RESPIRATORY (INHALATION) at 07:32

## 2022-12-07 RX ADMIN — Medication 7 UNITS: at 12:04

## 2022-12-07 RX ADMIN — ENOXAPARIN SODIUM 30 MG: 100 INJECTION SUBCUTANEOUS at 08:40

## 2022-12-07 RX ADMIN — Medication 10 UNITS: at 08:40

## 2022-12-07 RX ADMIN — INSULIN GLARGINE 60 UNITS: 100 INJECTION, SOLUTION SUBCUTANEOUS at 23:16

## 2022-12-07 RX ADMIN — BUSPIRONE HYDROCHLORIDE 15 MG: 5 TABLET ORAL at 08:41

## 2022-12-07 RX ADMIN — Medication 7 UNITS: at 08:40

## 2022-12-07 RX ADMIN — POLYETHYLENE GLYCOL 3350 17 G: 17 POWDER, FOR SOLUTION ORAL at 08:42

## 2022-12-07 RX ADMIN — CASTOR OIL AND BALSAM, PERU: 788; 87 OINTMENT TOPICAL at 08:41

## 2022-12-07 RX ADMIN — BUSPIRONE HYDROCHLORIDE 15 MG: 5 TABLET ORAL at 14:05

## 2022-12-07 RX ADMIN — Medication 10 UNITS: at 12:05

## 2022-12-07 RX ADMIN — CASTOR OIL AND BALSAM, PERU: 788; 87 OINTMENT TOPICAL at 20:56

## 2022-12-07 RX ADMIN — OXYCODONE 5 MG: 5 TABLET ORAL at 03:33

## 2022-12-07 RX ADMIN — BUSPIRONE HYDROCHLORIDE 15 MG: 5 TABLET ORAL at 20:52

## 2022-12-07 NOTE — DIABETES MGMT
[facility]    CLINICAL NURSE SPECIALIST CONSULT     Initial Presentation   Fernando Ochoa is a 36 y.o. male admitted 11/15/22 after experiencing chest pain. Afebrile. Hypertensive. 02 sats 100%  LAB: WBC 8.7. Normal H&H. /AG 3. Normal kidney & liver parameters. NT pro-; troponin 714  CXR:  Mild interstitial pulmonary edema versus interstitial pneumonia. No   pneumothorax. Consider PA and lateral chest views when the patient can better   tolerate. HX:   Past Medical History:   Diagnosis Date    Anxiety and depression     anxiety, depression    Bleeding of eye, left     8/17/21 pt reports receiving injections in right eye for beeding    Chronic headaches 2007    COVID-19 12/20/2020    Diabetes type 1, uncontrolled     since 8years old    GERD (gastroesophageal reflux disease)     Hypercholesterolemia     Hypertension     Hypothyroidism     MI (myocardial infarction) ([facility] [address]     as of 8/17/21 pt reports 9 stents total    WALLY on CPAP       INITIAL DX:   Acute non-Q wave non-ST elevation myocardial infarction (NSTEMI) ([facility] [address] [I21.4]  Aortic stenosis [I35.0]  CAD (coronary artery disease) [I25.10]     Current Treatment     TX: 11/22/22 On pump CORONARY ARTERY BYPASS GRAFT  X 1 WITH LEFT INTERNAL MAMMARY ARTERY to LAD  AORTIC VALVE REPLACEMENT WITH MEYER 25MM INSPIRIS RESILIA TISSUE VALVE   REPAIR OF ASCENDING AORTIC ANEURYSM with 26mm hemashield graft    Consulted by Provider for advanced diabetes nursing assessment and care for:   [x] Transitioning off Clarissa    [x] Inpatient management strategy  [] Home management assessment  [] Survival skill education    Hospital Course   Clinical progress has been complicated by need for ICU level of care. 11/24/22 Overnight developed hypoxemia. Underwent bronch and SYLWIA, neither of which explained cause for hypoxemia. He required re-intubation and was put on veletri. 11/25/22 Intermittent desaturation events. CXR this a.m. with pulmonary edema  11/27/22 Concern for hepatic ischemia/infarction with progressive trnasaminitis. GI consulted determines severe hepatitis  11/29/22 Sedated on Precedex & Propofol. On C vent support Fi02 50%/Peep 8. Trying to transition off epi to baldev infusions. Will restart Tfs. NG 2 suction at the moment. Plan on diuresing  11/30/22 Follows commands with cough & gag+. On AC vent support Fi02 50%/Peep 8. On Fentanyl infusion; Propofol being weaned. BP managed with epi & baldev infusions. NG to suction & drawing 500cc over past day. KKUB revealed prominent fecal status. Receiving Miralax & Senna; no BM for 14 days. 12/1/22 Anxious. BP & HR up. Plan to extubate this morning. On Spon vent support Fi02 40%/Peep 5. Lots of oral secretions. Remains on baldev & epi infusions fo BP management. NG clamped. Will receive enema today for fecal stasis. 12/2/22 Alert & oriented. Precedex no longer needed for anxiety; will be stopped. O2 via MF. NG remains in place. Plans for Speech evaluation to move to oral feeding. In the meantime, a Dobhoff may be indicated with initial feedings at trickle rate. PT will be started as well. 12/5/22 Alert & oriented. Afebrile. On MF 02; no CPAP overnight. CXR:   Stable cardiac silhouette, prior sternotomy. No acute infiltrate or   shift. Removal of central line. Eating first true meal this morning; ate everything. OOB with assistance & less dizziness per nursing. Moving bowels per patient. 12/6/22 Alert & oriented. Afebrile. On room air. Eating well. OOB to chair. 12/7/2022 A&O. Eating well. Sitting up in the chair. Mother at bedside.      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 Max daily dose of 150 units. Diabetes self-management practices:   Eating pattern - Eats 3 meals on most days and snacks in the evening   [x] Not eating a carbohydrate-controlled mealplan  Physical activity pattern   [x] Not employing a physical activity program to control BG  Monitoring pattern   [x] Testing BGs   [x] Breakfast 300s  [x] Lunch  100-200s  [x] Dinner  100-200s  Taking medications pattern  [x] Consistent administration  [x] Affordable  Overall evaluation:    [x] Not achieving A1c target with drug therapy & self-care practices    Subjective   \"I talking 32 units with each meal plus sliding scale     Objective   Physical exam  General Obese male in no acute distress  Neuro  Alert & oriented  Vital Signs Visit Vitals  /61   Pulse 93   Temp 98.4 °F (36.9 °C)   Resp 16   Ht 5' 9\" (1.753 m)   Wt 109.7 kg (241 lb 13.5 oz)   SpO2 90%   BMI 35.71 kg/m²     Laboratory  Recent Labs     12/07/22  0354 12/06/22  0326 12/05/22  0628   * 229* 118*   AGAP 4* 7 6   WBC 8.7 8.7 10.3   CREA 1.07 1.00 0.86   AST 49* 36 46*   * 138* 174*     Factors impacting BG management  Factor Dose Comments   Nutrition:  Easy to Chew   60 gram CHO meals   Eating well   Drugs:  Atypical antipsychotics   Buspar 3XB. Zoloft D      Pain Oxycodone PRN    Infection  Afebrile.  WBC normalized   Other:   Kidney function  Liver function   SERGE resolved  AST/ALT elevated but continues to trend down     Acute Severe Hepatitis per GI     Blood glucose pattern      Significant diabetes-related events over the past 24-72 hours  11/15/22 Admission   Total insulin requirements without achieving target Bgs until 11/20/22:    11/21/22 Started on Christensen Cinnamon  11/23/22 Using 5-10 units/hr of insulin this morning  11/28/22 Using 2-6 units/hr of insulin  11/29/22 Continues to use 3-6 units/hr of insulin  11/30/22 Using 3.5 units/hr => transitioned off GS  12/1/22 Bgs rising into 200-300s  12/2/22 Change in basal insulin dosing has brought Bgs to goal  12/4/22 Hypoglycemia in the AM  12/6/22 . Eating much better over past 24 hour    Assessment and Plan   Nursing Diagnosis Risk for unstable blood glucose pattern   Nursing Intervention Domain 5250 Decision-making Support   Nursing Interventions Examined current inpatient diabetes/blood glucose control   Explored factors facilitating and impeding inpatient management  Explored corrective strategies with patient and responsible inpatient provider   Informed patient of rational for insulin strategy while hospitalized     Evaluation   This 36year old  male with known CAD was admitted with chest pain; he underwent CABG & AVR. Patient has known Type 1 diabetes with poor control going into this surgery. Patient required 260 units/D prior to the surgery (during this admission) without consistently achieving BG targets. Had been on Christensen Cinnamon since surgery 11/22/22; also required vent & pressor support. Abdominal distention related to feces finally resolved with bowel regimen. Extubated to MidFlow 02; now on room air. Not eating well over the weekend but now eating consistently. It is clear his basal insulin needs are less than prior to admission; basal dose reduced. Now eating well. Recommend adding mealtime insulin.   PerFect Served with [facility] NP.  BG's remain elevated today. The patient is using less insulin than his home dosing. The patient reportedly uses 140 units Toujeo daily and 32 units Humalog and sliding scale with each meal. The patient is now eating well. I recommend an increase in meal time insulin. Recommendations     [x] Continue Lantus insulin to 60 units twice daily    [x] Add Humalog insulin 20 units at meals    Billing Code(s)   [x] 74809 IP subsequent hospital care - 15 minutes     Before making these care recommendations, I personally reviewed the hospitalization record, including notes, laboratory & diagnostic data and current medications, and examined the patient at the bedside (circumstances permitting) before making care recommendations. More than fifty (50) percent of the time was spent in patient counseling and/or care coordination.   Total minutes: 15 minutes    LUCY Mariee  Diabetes Clinical Nurse Specialist  [facility]  Access via eMagin

## 2022-12-07 NOTE — PROGRESS NOTES
Problem: Mobility Impaired (Adult and Pediatric)  Goal: *Acute Goals and Plan of Care (Insert Text)  Description: FUNCTIONAL STATUS PRIOR TO ADMISSION: Patient was independent and active without use of DME. Still driving. Denies home O2 use. States he is active at baseline, performing yard work and working part-time as a . HOME SUPPORT PRIOR TO ADMISSION: The patient lived with mother. Physical Therapy Goals  Revised 12/2/2022  1. Patient will move from supine to sit and sit to supine  in bed with supervision/set-up within 7 days. 2.  Patient will perform sit to/from stand with CGA within 7 days. 3.  Patient will ambulate 200 feet with least restrictive assistive device and contact guard assist within 7 days. 4.  Patient will ascend/descend 3 stairs with 1 handrail(s) with contact guard assist within 7 days. 5.  Patient will perform cardiac exercises per protocol with independence within 7 days. 6.  Patient will verbally recall and functionally demonstrate mindful-based movements (\"move in the tube\") principles without cues within 7 days. Physical Therapy Goals  Initiated 11/23/2022  1. Patient will move from supine to sit and sit to supine , scoot up and down, and roll side to side in bed with modified independence within 5 days. 2.  Patient will perform sit to/from stand with modified independence within 5 days. 3.  Patient will ambulate 300 feet with least restrictive assistive device and independence within 5 days. 4.  Patient will ascend/descend 3 stairs with 1 handrail(s) with modified independence within 5 days. 5.  Patient will perform cardiac exercises per protocol with independence within 5 days. 6.  Patient will verbally and functionally demonstrate 3/3 sternal precautions without cues within 5 days.          Outcome: Progressing Towards Goal   PHYSICAL THERAPY TREATMENT  Patient: Sri Pereira (1 y.o. male)  Date: 12/7/2022  Diagnosis: Acute non-Q wave non-ST elevation myocardial infarction (NSTEMI) ([facility]) [I21.4]  Aortic stenosis [I35.0]  CAD (coronary artery disease) [I25.10] <principal problem not specified>  Procedure(s) (LRB):  SYLWIA BY DR Mikhail Linares -  CORONARY ARTERY BYPASS GRAFT  X 1 WITH LEFT INTERNAL MAMMARY ARTERY GRAFTING - AORTIC VALVE REPLACEMENT WITH MEYER 25MM INSPIRIS RESILIA TISSUE VALVE AND REPAIR OF ASCENDING AORTIC ANEURYSM (N/A) 15 Days Post-Op  Precautions: Sternal (move in the tube)  Chart, physical therapy assessment, plan of care and goals were reviewed. ASSESSMENT  Patient continues with skilled PT services and is progressing towards goals. He demonstrates improved balance this session. Patient performs sit<>stand with VC for \"move in the tube\" and CGA. He is able to ambulate increased distance with good gait speed and use of RW. He reports increased work of breathing with gait and mask has to be removed. On return to room patient is seated in bedside chair. He completes cardiac arm exercises with VC and tactile cues for form. Patient requires active assistance for shoulder flexion due to preexisting shoulder problems. He is unable to perform shoulder Abduction. Current Level of Function Impacting Discharge (mobility/balance): Min A     Other factors to consider for discharge: medical stability, decreased strength/endurance, increased need for assistance, PLOF- Independent and works part-time         PLAN :  Patient continues to benefit from skilled intervention to address the above impairments. Continue treatment per established plan of care. to address goals.     Recommendation for discharge: (in order for the patient to meet his/her long term goals)  Therapy 3 hours per day 5-7 days per week    This discharge recommendation:  Has been made in collaboration with the attending provider and/or case management    IF patient discharges home will need the following DME: to be determined (TBD)       SUBJECTIVE:   Patient stated .     OBJECTIVE DATA SUMMARY:   Critical Behavior:  Neurologic State: Alert  Orientation Level: Oriented X4  Cognition: Follows commands  Safety/Judgement: Awareness of environment, Insight into deficits  Functional Mobility Training:  Bed Mobility:                    Transfers:  Sit to Stand: Contact guard assistance  Stand to Sit: Contact guard assistance                             Balance:  Sitting: Intact; Without support  Standing: Intact; With support  Ambulation/Gait Training:  Distance (ft): 125 Feet (ft) (x2)  Assistive Device: Gait belt;Walker, rolling  Ambulation - Level of Assistance: Contact guard assistance        Gait Abnormalities: Decreased step clearance        Base of Support: Widened        Step Length: Right shortened;Left shortened                    Stairs: Therapeutic Exercises:     Pain Rating:      Activity Tolerance:   Fair    After treatment patient left in no apparent distress:   Sitting in chair, Call bell within reach, Bed / chair alarm activated, and Caregiver / family present    COMMUNICATION/COLLABORATION:   The patients plan of care was discussed with: Registered nurse.      Ariela Simmons, PT   Time Calculation: 27 mins

## 2022-12-07 NOTE — PROGRESS NOTES
[facility] ICU Progress Note    Admit Date: 11/15/2022  POD:  15 Day Post-Op    Procedure:  Procedure(s):  SYLWIA BY DR Bushra Guerra -  CORONARY ARTERY BYPASS GRAFT  X 1 WITH LEFT INTERNAL MAMMARY ARTERY GRAFTING - AORTIC VALVE REPLACEMENT WITH MEYER 25MM INSPIRIS RESILIA TISSUE VALVE AND REPAIR OF ASCENDING AORTIC ANEURYSM        Subjective/Interval:   Pt seen with Dr. Shellie Anton. Feeling well this am. Up to chair. Minimal complaints of pain. Wearing clothes from home. On RA. Afebrile. Objective:   Vitals:  Blood pressure (!) 114/50, pulse 90, temperature 98.3 °F (36.8 °C), resp. rate 19, height 5' 9\" (1.753 m), weight 241 lb 13.5 oz (109.7 kg), SpO2 96 %. Temp (24hrs), Av.3 °F (36.8 °C), Min:98.1 °F (36.7 °C), Max:98.6 °F (37 °C)    EKG/Rhythm:  NSR 80-90s    Oxygen Therapy:  RA    CXR  CXR Results  (Last 48 hours)                 22 0618  XR CHEST PORT Final result    Impression:  1. Minimal bibasilar areas of atelectasis are noted. This has improved in the   interval.       Narrative:  EXAM:  XR CHEST PORT       INDICATION: Postop heart surgery       COMPARISON: 2022       TECHNIQUE: Upright portable chest AP view at 0542       FINDINGS: The patient is status post median sternotomy. Mild cardiomegaly is   unchanged. Lungs demonstrate improved aeration. Minimal linear atelectasis is   seen at the left lung bases has decreased. Minimal linear atelectasis is seen at   the right lung base. No pneumonia. No pulmonary edema. No pneumothorax. 22 0444  XR CHEST PORT Final result    Impression:  1. Mild bibasilar atelectasis as described above. Follow-up to resolution is   suggested. This has improved in the interval.       Narrative:  EXAM:  XR CHEST PORT       INDICATION: Postop heart surgery       COMPARISON: 2022       TECHNIQUE: Upright portable chest AP view at 0437       FINDINGS: The patient is status post median sternotomy. Mild cardiomegaly.  Lungs   demonstrate mild bibasilar atelectasis medially in the lower lobes left greater   than right. Small rounded opacity measuring approximately 1 cm at the right lung   base was not present previously and is likely an area of atelectasis. This can   be evaluated on follow-up. No pneumothorax. No pulmonary edema. Admission Weight: Last Weight   Weight: 257 lb 15 oz (117 kg) Weight: 241 lb 13.5 oz (109.7 kg)     Intake / Output / Drain:  Current Shift: 12/07 0701 - 12/07 1900  In: 240 [P.O.:240]  Out: -   Last 24 hrs.:   Intake/Output Summary (Last 24 hours) at 12/7/2022 0943  Last data filed at 12/7/2022 0839  Gross per 24 hour   Intake 960 ml   Output 5500 ml   Net -4540 ml     EXAM:  General:  Alert, appropriate, NAD. Lungs:    Diminished to auscultation bilaterally   Incision:  Incision clean, dry and intact and prineo intact   Heart:   Regular rate and rhythm  and no murmur, rubs or gallops   Abdomen:    Distended, soft, hypoactive bowel sounds, and obese. Extremities:  Trace edema BLE, BUE   Neurologic:  Moving all extremities purposefully. A&O x4. Sensory intact     Labs:   Recent Labs     12/07/22  0745 12/07/22  0741 12/07/22  0354 12/06/22  0812 12/06/22  0326   WBC  --   --  8.7  --  8.7   HGB  --   --  10.6*  --  10.0*   HCT  --   --  33.9*  --  32.5*   PLT  --   --  264  --  239   NA  --   --  132*  --  135*   K  --   --  4.8  --  4.5   BUN  --   --  19  --  19   CREA  --   --  1.07  --  1.00   GLU  --   --  312*  --  229*   GLUCPOC 277*   < >  --    < >  --    INR  --   --   --   --  1.1    < > = values in this interval not displayed. Assessment:     Active Problems:    Acute non-Q wave non-ST elevation myocardial infarction (NSTEMI) ([address] (11/15/2022)      Aortic stenosis (11/22/2022)      CAD (coronary artery disease) (11/22/2022)      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)      Overview:  On pump CORONARY ARTERY BYPASS GRAFT  X 1 WITH LEFT INTERNAL MAMMARY       ARTERY to LAD      AORTIC VALVE REPLACEMENT WITH MEYER 25MM INSPIRIS RESILIA TISSUE VALVE       REPAIR OF ASCENDING AORTIC ANEURYSM with 26mm hemashield graft      S/P AVR (aortic valve replacement) and aortoplasty (11/22/2022)      Overview: On pump CORONARY ARTERY BYPASS GRAFT  X 1 WITH LEFT INTERNAL MAMMARY       ARTERY to LAD      AORTIC VALVE REPLACEMENT WITH MEYER 25MM INSPIRIS RESILIA TISSUE VALVE       REPAIR OF ASCENDING AORTIC ANEURYSM with 26mm hemashield graft       Plan/Recommendations/Medical Decision Making:     Severe symptomatic AS, s/p tissue AVR. Continue ASA. CAD, STEMI, s/p CABG. Continu e ASA. Holding amio, statin for transaminitis. Repeat Echo without effusion or tamponade, NL LV/RVF. Acute hypoxic respiratory failure: Extubated, cont to wean O2 as able. Cont diuresis, hourly IS. Nebs BID and PRN. Flutter valve. On RA  SERGE: Crt up to 2.01. Nephrology consult appreciated, avoid nephrotoxins, trend. Crt 1.07 this am. Change Lasix 40 mg to daily PO  Acute blood loss anemia: improving, H&H up to 10/32.5 this am  Transaminitis. Acute Severe Hepatitis, GI consult appreciated, + hepatomegaly and splenomegaly. Viral panel neg. Triple phase liver scan completed 11/30. No hepatic of splenic ischemia noted. + hepatic steatosis. Labs cont to trend down. Leukocytosis: BC, Sputum and urine cultures NGTD. ID following, Completed Meropenem. Rapid COVID pos, 2 PCR negative. WBC up to 22.6, 8.7 today, Afebrile. Last Procal 0.09   HTN: On ACEi, BB PTA; Cont Metop 25mg BID  HLD: holding statin. WALLY , not on CPAP. Was unable to tolerate due to anxiety; he has been working on this with Sleep Medicine. OP follow-up. refusing CPAP  DMII. On Toujeo at home. Repeat A1C 9.0. Diabetes management following. Cont lantus and mealtime coverage  GERD. Continue PPI. Anxiety and depression. On Buspar, Zoloft; continue. Supportive care. Ileus: + BMs. Reduce bowel regimen. Nutrition: Advance diet as tolerated, plan to be seen by speech again today. Deconditioning/mobility: OOB TID, ambulating BID. Arm exercises. PT stating IPR over the weekend, will reassess this am. Pt wishes to go home with [facility] PT, will cont to work to see if he is able. Order placed for referral to [facility]     DVT ppx- Lovenox  Dispo- PT/OT. Stepdown. OOB TID, Ambulating BID.  Case management following to aid in d/c planning, d/c TBD      Signed By: Jaime Petty NP

## 2022-12-07 NOTE — PROGRESS NOTES
Problem: Falls - Risk of  Goal: *Absence of Falls  Description: Document Warren Fall Risk and appropriate interventions in the flowsheet.   Outcome: Progressing Towards Goal  Note: Fall Risk Interventions:  Mobility Interventions: Assess mobility with egress test    Mentation Interventions: Adequate sleep, hydration, pain control    Medication Interventions: Assess postural VS orthostatic hypotension, Bed/chair exit alarm    Elimination Interventions: Bed/chair exit alarm, Call light in reach, Patient to call for help with toileting needs    History of Falls Interventions: Bed/chair exit alarm, Consult care management for discharge planning         Problem: Patient Education: Go to Patient Education Activity  Goal: Patient/Family Education  Outcome: Progressing Towards Goal

## 2022-12-07 NOTE — PROGRESS NOTES
ADULT PROTOCOL: JET AEROSOL ASSESSMENT    Patient  Aydin Surveyor     36 y.o.   male     2022  10:05 AM    Breath Sounds Pre Procedure: Right Breath Sounds: Diminished                               Left Breath Sounds: Diminished    Breath Sounds Post Procedure: Right Breath Sounds: Diminished                                 Left Breath Sounds: Diminished    Breathing pattern: Pre procedure Breathing Pattern: Regular          Post procedure Breathing Pattern: Regular    Heart Rate: Pre procedure Pulse: 90           Post procedure Pulse: 100    Resp Rate: Pre procedure Respirations: 18           Post procedure Respirations: 18           Changed: NO    SpO2: Pre procedure SpO2: 96 %   without oxygen              Post procedure SpO2: 98 %  without oxygen    Nebulizer Therapy: Current medications Aerosolized Medications: DuoNeb      Changed: YES    Smoking History:   Social History     Tobacco Use   Smoking Status Former    Packs/day: 0.00    Years: 14.00    Pack years: 0.00    Types: Cigarettes    Quit date: 2014    Years since quittin.9   Smokeless Tobacco Never       Problem List:   Patient Active Problem List   Diagnosis Code    DM [facility]diabetes mellitus) ([facility] E11.9    Acquired hypothyroidism E03.9    Essential hypertension I10    Fibromyalgia M79.7    Microalbuminuria R80.9    Anxiety F41.9    Migraine without aura G43.009    Hypertriglyceridemia E78.1    Severe obesity (HCC) E66.01    Transient ischemic attack involving left internal carotid artery G45. 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 01XA    Stable proliferative diabetic retinopathy of both eyes associated with type 1 diabetes mellitus ([facility] Z13.0973    Bilateral carotid artery stenosis I65.23    Acute non-Q wave non-ST elevation myocardial infarction (NSTEMI) ([facility]) I21.4    Aortic stenosis I35.0    CAD (coronary artery disease) I25.10    S/P CABG x 1 Z95.1    S/P AVR (aortic valve replacement) and aortoplasty Z95.2       Respiratory Therapist: Tina Guerra RT

## 2022-12-07 NOTE — PROCEDURES
[facility]  PULMONARY FUNCTION TEST    Name:  Ladan Urrutia  MR#:  [id number]  :  1980  ACCOUNT #:  [de-identified]  DATE OF SERVICE:  2022    REASON FOR THE TEST:  Shortness of breath. Spirometry was performed and it reveals:  1. No airflow obstruction. 2.  Mild reduction of FVC. 3.  Mild reduction of DLCO. 4.  Normal flow volume loop.       Salvador Mckinney MD      [id number]  D:  2022 14:58  T:  2022 20:07  JOB #:  [id number]  CC:  Lisbeth Ricardo NP

## 2022-12-07 NOTE — PROGRESS NOTES
Problem: Self Care Deficits Care Plan (Adult)  Goal: *Acute Goals and Plan of Care (Insert Text)  Description:     FUNCTIONAL STATUS PRIOR TO ADMISSION: Patient was independent and active without use of DME.    HOME SUPPORT: The patient lived with his mother. Occupational Therapy Goals  Re-evaluation 12/2/2022  1. Patient will perform grooming sitting edge of bed or unsupported in chair with mod I after set up within 7 day(s). 2.  Patient will tolerate > or = 5 minutes static standing for functional task without UE support within 7 day(s). 3.  Patient will follow move in the tube precaution for bed mobility, sit to stand, stand to sit with 1-2 verbal cues within 7 day(s). 4.  Patient will transfer to and from toilet and complete all toileting tasks with CGA to minimal assist within 7 day(s). 5.  Patient will perform cardiac exercises with handout and min cues within 7 day(s). Initiated 11/23/2022  1. Patient will perform grooming standing at sink with supervision/set-up within 7 day(s). 2.  Patient will perform upper body dressing with supervision/set-up within 7 day(s). 3.  Patient will perform lower body dressing with supervision/set-up within 7 day(s). 4.  Patient will perform toilet transfers with supervision/set-up within 7 day(s). 5.  Patient will perform all aspects of toileting with supervision/set-up within 7 day(s).          Outcome: Progressing Towards Goal    OCCUPATIONAL THERAPY TREATMENT  Patient: Sri Pereira (60 y.o. male)  Date: 12/7/2022  Diagnosis: Acute non-Q wave non-ST elevation myocardial infarction (NSTEMI) ([facility]) [I21.4]  Aortic stenosis [I35.0]  CAD (coronary artery disease) [I25.10] <principal problem not specified>  Procedure(s) (LRB):  SYLWIA BY DR Arnulfo Noguera -  CORONARY ARTERY BYPASS GRAFT  X 1 WITH LEFT INTERNAL MAMMARY ARTERY GRAFTING - AORTIC VALVE REPLACEMENT WITH MEYER 25MM INSPIRIS RESILIA TISSUE VALVE AND REPAIR OF ASCENDING AORTIC ANEURYSM (N/A) 15 Days Post-Op  Precautions: Sternal (move in the tube)  Chart, occupational therapy assessment, plan of care, and goals were reviewed. ASSESSMENT  Patient continues to be progressively more motivated each session and is making steady functional progress. He is demonstrating notable improvements in his activity tolerance, B shoulder strength, general strength and standing balance for the performance of ADLs and functional mobility. The patient is generally compliant with his sternal precautions during dressing ADLs, but her requires cueing for consistent adherence to his sternal precautions during sit to/from stand transfers. Overall he was SBA for sit to stand transfers, CGA for ambulation with a RW, supervision/setup for UB dressing and standing grooming, and he requires SBA for toileting. Some SOB with activity and patient requiring occasional rest breaks for pacing, but VSS t/o session. No LOB during standing ADLs, transfers and ambulation with the RW today. Some cueing is needed for safe management of the RW, but the patient is receptive. At this time he continues to benefit from acute OT and will need inpatient rehab after discharge. PLAN :  Patient continues to benefit from skilled intervention to address the above impairments. Continue treatment per established plan of care. to address goals. Recommendation for discharge: (in order for the patient to meet his/her long term goals)  Therapy 3 hours per day 5-7 days per week      Equipment recommendations for successful discharge (if) home:TBD pending continued progress. OBJECTIVE DATA SUMMARY:   Cognitive/Behavioral Status:  Neurologic State: Alert  Orientation Level: Oriented X4  Cognition: Appropriate for age attention/concentration; Follows commands        Safety/Judgement: Insight into deficits; Decreased awareness of need for safety    Functional Mobility and Transfers for ADLs:  Bed Mobility:  Presented up in chair  Scooting: Supervision    Transfers:  Sit to Stand: Stand-by assistance  Functional Transfers  Bathroom Mobility: Contact guard assistance (ambulating with a RW)  Toilet Transfer : Stand-by assistance  Cues: Verbal cues provided       Balance:  Sitting: Intact; Without support  Standing: Impaired  Standing - Static: Good  Standing - Dynamic : Good (with RW for ambulation, without support during grooming)    ADL Intervention:  Grooming  Position Performed: Standing  Washing Hands: Supervision;Set-up  Brushing Teeth: Supervision;Set-up  Cues: Verbal cues provided (for safe positioning of RW in font of sink)    Upper Body Dressing Assistance  Shirt simulation with hospital gown: Supervision;Set-up; Compensatory technique training (performed seated in chair)  Cues: Verbal cues provided    Toileting  Toileting Assistance: Stand-by assistance  Bowel Hygiene: Supervision  Clothing Management: Stand-by assistance    Cognitive Retraining  Safety/Judgement: Insight into deficits; Decreased awareness of need for safety    Therapeutic Exercises:      CARDIAC  EXERCISE   Sets   Reps   Active Active Assist   Passive Self ROM   Comments   Shoulder flexion 1 10 []                                            [x]                                            []                                            []                                               Shoulder abduction 1 10 []                                            [x]                                            []                                            []                                               Scapular elevation 1 10 [x]                                            []                                            []                                            []                                               Scapular retraction 1 10 [x]                                            []                                            []                                            [] Trunk rotation 1 10 [x]                                            []                                            []                                            []                                               Trunk sidebending 1 10 [x]                                            []                                            []                                            []                                                  []                                            []                                            []                                            []                                                    Pain:  Expected sternal incision pain    Activity Tolerance:   Fair  Please refer to the flowsheet for vital signs taken during this treatment.     After treatment patient left in no apparent distress:   Sitting in chair, Call bell within reach, Bed / chair alarm activated, and Caregiver / family present    Aimee Callahan OTR/L  Time Calculation: 23 mins

## 2022-12-07 NOTE — PROGRESS NOTES
1900 Bedside and Verbal shift change report given to [address] (oncoming nurse) by Lee Hernandez RN (offgoing nurse). Report included the following information SBAR, Kardex, Intake/Output, MAR, Recent Results, and Cardiac Rhythm NSR . End of Shift Note    Bedside shift change report given to [employer] [facility] (oncoming nurse) by Jessica Obrien RN (offgoing nurse). Report included the following information SBAR, Kardex, Intake/Output, MAR, Recent Results, and Cardiac Rhythm NSR    Shift worked:  1900 - 0700     Shift summary and any significant changes:     CHG bath and wound/wire care completed. PRN Oxy given x1. Concerns for physician to address:       Zone phone for oncoming shift:          Activity:  Activity Level: Up with Assistance  Number times ambulated in hallways past shift: 0  Number of times OOB to chair past shift: 1    Cardiac:   Cardiac Monitoring: Yes      Cardiac Rhythm: Sinus Rhythm    Access:  Current line(s): PIV     Genitourinary:   Urinary status: voiding    Respiratory:   O2 Device: None (Room air)  Chronic home O2 use?: NO  Incentive spirometer at bedside: YES  Actual Volume (ml): 1500 ml    GI:  Last Bowel Movement Date: 12/05/22  Current diet:  ADULT ORAL NUTRITION SUPPLEMENT Breakfast, Lunch, Dinner; Low Calorie/High Protein  ADULT DIET Regular; 4 carb choices (60 gm/meal)  Passing flatus: YES  Tolerating current diet: YES       Pain Management:   Patient states pain is manageable on current regimen: YES    Skin:  Sree Score: 19  Interventions: increase time out of bed, PT/OT consult, and nutritional support     Patient Safety:  Fall Score:  Total Score: 3  Interventions: bed/chair alarm, assistive device (walker, cane, etc), gripper socks, pt to call before getting OOB, and stay with me (per policy)  High Fall Risk: Yes    Length of Stay:  Expected LOS: 1d 19h  Actual LOS: [address], RN

## 2022-12-08 ENCOUNTER — APPOINTMENT (OUTPATIENT)
Dept: GENERAL RADIOLOGY | Age: 42
End: 2022-12-08
Attending: NURSE PRACTITIONER
Payer: COMMERCIAL

## 2022-12-08 LAB
ALBUMIN SERPL-MCNC: 2.9 G/DL (ref 3.5–5)
ALBUMIN/GLOB SERPL: 0.8 {RATIO} (ref 1.1–2.2)
ALP SERPL-CCNC: 170 U/L (ref 45–117)
ALT SERPL-CCNC: 110 U/L (ref 12–78)
ANION GAP SERPL CALC-SCNC: 6 MMOL/L (ref 5–15)
AST SERPL-CCNC: 52 U/L (ref 15–37)
BASOPHILS # BLD: 0 K/UL (ref 0–0.1)
BASOPHILS NFR BLD: 0 % (ref 0–1)
BILIRUB SERPL-MCNC: 0.7 MG/DL (ref 0.2–1)
BUN SERPL-MCNC: 17 MG/DL (ref 6–20)
BUN/CREAT SERPL: 16 (ref 12–20)
CALCIUM SERPL-MCNC: 9 MG/DL (ref 8.5–10.1)
CHLORIDE SERPL-SCNC: 99 MMOL/L (ref 97–108)
CO2 SERPL-SCNC: 29 MMOL/L (ref 21–32)
CREAT SERPL-MCNC: 1.06 MG/DL (ref 0.7–1.3)
DIFFERENTIAL METHOD BLD: ABNORMAL
EOSINOPHIL # BLD: 0.7 K/UL (ref 0–0.4)
EOSINOPHIL NFR BLD: 8 % (ref 0–7)
ERYTHROCYTE [DISTWIDTH] IN BLOOD BY AUTOMATED COUNT: 15.4 % (ref 11.5–14.5)
GLOBULIN SER CALC-MCNC: 3.6 G/DL (ref 2–4)
GLUCOSE BLD STRIP.AUTO-MCNC: 122 MG/DL (ref 65–117)
GLUCOSE BLD STRIP.AUTO-MCNC: 190 MG/DL (ref 65–117)
GLUCOSE BLD STRIP.AUTO-MCNC: 231 MG/DL (ref 65–117)
GLUCOSE BLD STRIP.AUTO-MCNC: 233 MG/DL (ref 65–117)
GLUCOSE BLD STRIP.AUTO-MCNC: 62 MG/DL (ref 65–117)
GLUCOSE BLD STRIP.AUTO-MCNC: 69 MG/DL (ref 65–117)
GLUCOSE BLD STRIP.AUTO-MCNC: 73 MG/DL (ref 65–117)
GLUCOSE BLD STRIP.AUTO-MCNC: 81 MG/DL (ref 65–117)
GLUCOSE SERPL-MCNC: 222 MG/DL (ref 65–100)
HCT VFR BLD AUTO: 32.1 % (ref 36.6–50.3)
HGB BLD-MCNC: 10 G/DL (ref 12.1–17)
IMM GRANULOCYTES # BLD AUTO: 0 K/UL (ref 0–0.04)
IMM GRANULOCYTES NFR BLD AUTO: 0 % (ref 0–0.5)
LYMPHOCYTES # BLD: 2.7 K/UL (ref 0.8–3.5)
LYMPHOCYTES NFR BLD: 33 % (ref 12–49)
MAGNESIUM SERPL-MCNC: 2.2 MG/DL (ref 1.6–2.4)
MCH RBC QN AUTO: 26.9 PG (ref 26–34)
MCHC RBC AUTO-ENTMCNC: 31.2 G/DL (ref 30–36.5)
MCV RBC AUTO: 86.3 FL (ref 80–99)
MONOCYTES # BLD: 0.3 K/UL (ref 0–1)
MONOCYTES NFR BLD: 4 % (ref 5–13)
NEUTS BAND NFR BLD MANUAL: 1 %
NEUTS SEG # BLD: 4.6 K/UL (ref 1.8–8)
NEUTS SEG NFR BLD: 54 % (ref 32–75)
NRBC # BLD: 0 K/UL (ref 0–0.01)
NRBC BLD-RTO: 0 PER 100 WBC
PLATELET # BLD AUTO: 236 K/UL (ref 150–400)
PMV BLD AUTO: 9.6 FL (ref 8.9–12.9)
POTASSIUM SERPL-SCNC: 4.6 MMOL/L (ref 3.5–5.1)
PROT SERPL-MCNC: 6.5 G/DL (ref 6.4–8.2)
RBC # BLD AUTO: 3.72 M/UL (ref 4.1–5.7)
RBC MORPH BLD: ABNORMAL
SERVICE CMNT-IMP: ABNORMAL
SERVICE CMNT-IMP: NORMAL
SODIUM SERPL-SCNC: 134 MMOL/L (ref 136–145)
WBC # BLD AUTO: 8.3 K/UL (ref 4.1–11.1)

## 2022-12-08 PROCEDURE — 74011250636 HC RX REV CODE- 250/636: Performed by: NURSE PRACTITIONER

## 2022-12-08 PROCEDURE — 74011636637 HC RX REV CODE- 636/637: Performed by: NURSE PRACTITIONER

## 2022-12-08 PROCEDURE — 71045 X-RAY EXAM CHEST 1 VIEW: CPT

## 2022-12-08 PROCEDURE — 74011250637 HC RX REV CODE- 250/637: Performed by: THORACIC SURGERY (CARDIOTHORACIC VASCULAR SURGERY)

## 2022-12-08 PROCEDURE — 36415 COLL VENOUS BLD VENIPUNCTURE: CPT

## 2022-12-08 PROCEDURE — 74011000250 HC RX REV CODE- 250: Performed by: NURSE PRACTITIONER

## 2022-12-08 PROCEDURE — 80053 COMPREHEN METABOLIC PANEL: CPT

## 2022-12-08 PROCEDURE — 97535 SELF CARE MNGMENT TRAINING: CPT

## 2022-12-08 PROCEDURE — 97110 THERAPEUTIC EXERCISES: CPT

## 2022-12-08 PROCEDURE — 92526 ORAL FUNCTION THERAPY: CPT

## 2022-12-08 PROCEDURE — 65270000046 HC RM TELEMETRY

## 2022-12-08 PROCEDURE — 99231 SBSQ HOSP IP/OBS SF/LOW 25: CPT

## 2022-12-08 PROCEDURE — 83735 ASSAY OF MAGNESIUM: CPT

## 2022-12-08 PROCEDURE — 82962 GLUCOSE BLOOD TEST: CPT

## 2022-12-08 PROCEDURE — 85025 COMPLETE CBC W/AUTO DIFF WBC: CPT

## 2022-12-08 PROCEDURE — 74011250637 HC RX REV CODE- 250/637: Performed by: NURSE PRACTITIONER

## 2022-12-08 PROCEDURE — 97116 GAIT TRAINING THERAPY: CPT

## 2022-12-08 PROCEDURE — 94640 AIRWAY INHALATION TREATMENT: CPT

## 2022-12-08 RX ORDER — INSULIN LISPRO 100 [IU]/ML
15 INJECTION, SOLUTION INTRAVENOUS; SUBCUTANEOUS
Status: DISCONTINUED | OUTPATIENT
Start: 2022-12-08 | End: 2022-12-09 | Stop reason: HOSPADM

## 2022-12-08 RX ORDER — ROSUVASTATIN CALCIUM 40 MG/1
40 TABLET, COATED ORAL
Status: DISCONTINUED | OUTPATIENT
Start: 2022-12-08 | End: 2022-12-09 | Stop reason: HOSPADM

## 2022-12-08 RX ORDER — LANOLIN ALCOHOL/MO/W.PET/CERES
9 CREAM (GRAM) TOPICAL
Status: DISCONTINUED | OUTPATIENT
Start: 2022-12-08 | End: 2022-12-09 | Stop reason: HOSPADM

## 2022-12-08 RX ADMIN — Medication 3 UNITS: at 12:33

## 2022-12-08 RX ADMIN — FUROSEMIDE 40 MG: 40 TABLET ORAL at 09:32

## 2022-12-08 RX ADMIN — OXYCODONE 5 MG: 5 TABLET ORAL at 07:05

## 2022-12-08 RX ADMIN — ENOXAPARIN SODIUM 30 MG: 100 INJECTION SUBCUTANEOUS at 09:30

## 2022-12-08 RX ADMIN — Medication 4 UNITS: at 09:30

## 2022-12-08 RX ADMIN — METOPROLOL TARTRATE 25 MG: 25 TABLET, FILM COATED ORAL at 09:32

## 2022-12-08 RX ADMIN — LORAZEPAM 0.5 MG: 2 INJECTION INTRAMUSCULAR; INTRAVENOUS at 01:15

## 2022-12-08 RX ADMIN — CASTOR OIL AND BALSAM, PERU: 788; 87 OINTMENT TOPICAL at 21:35

## 2022-12-08 RX ADMIN — POTASSIUM CHLORIDE 40 MEQ: 750 TABLET, FILM COATED, EXTENDED RELEASE ORAL at 09:33

## 2022-12-08 RX ADMIN — BUSPIRONE HYDROCHLORIDE 15 MG: 5 TABLET ORAL at 09:32

## 2022-12-08 RX ADMIN — INSULIN GLARGINE 60 UNITS: 100 INJECTION, SOLUTION SUBCUTANEOUS at 12:34

## 2022-12-08 RX ADMIN — ROSUVASTATIN CALCIUM 40 MG: 40 TABLET, FILM COATED ORAL at 21:35

## 2022-12-08 RX ADMIN — LEVOTHYROXINE SODIUM 150 MCG: 0.07 TABLET ORAL at 05:40

## 2022-12-08 RX ADMIN — Medication 20 UNITS: at 09:30

## 2022-12-08 RX ADMIN — POLYETHYLENE GLYCOL 3350 17 G: 17 POWDER, FOR SOLUTION ORAL at 09:33

## 2022-12-08 RX ADMIN — MELATONIN 9 MG: at 21:34

## 2022-12-08 RX ADMIN — SODIUM CHLORIDE, PRESERVATIVE FREE 10 ML: 5 INJECTION INTRAVENOUS at 21:35

## 2022-12-08 RX ADMIN — BUSPIRONE HYDROCHLORIDE 15 MG: 5 TABLET ORAL at 21:35

## 2022-12-08 RX ADMIN — Medication 20 UNITS: at 12:34

## 2022-12-08 RX ADMIN — METOPROLOL TARTRATE 25 MG: 25 TABLET, FILM COATED ORAL at 21:35

## 2022-12-08 RX ADMIN — ASPIRIN 81 MG CHEWABLE TABLET 81 MG: 81 TABLET CHEWABLE at 09:32

## 2022-12-08 RX ADMIN — SODIUM CHLORIDE, PRESERVATIVE FREE 10 ML: 5 INJECTION INTRAVENOUS at 05:42

## 2022-12-08 RX ADMIN — ALBUTEROL SULFATE 2.5 MG: 2.5 SOLUTION RESPIRATORY (INHALATION) at 20:24

## 2022-12-08 RX ADMIN — CASTOR OIL AND BALSAM, PERU: 788; 87 OINTMENT TOPICAL at 09:32

## 2022-12-08 RX ADMIN — PANTOPRAZOLE SODIUM 40 MG: 40 TABLET, DELAYED RELEASE ORAL at 09:32

## 2022-12-08 RX ADMIN — ENOXAPARIN SODIUM 30 MG: 100 INJECTION SUBCUTANEOUS at 21:34

## 2022-12-08 RX ADMIN — OXYCODONE 5 MG: 5 TABLET ORAL at 12:40

## 2022-12-08 RX ADMIN — SODIUM CHLORIDE, PRESERVATIVE FREE 10 ML: 5 INJECTION INTRAVENOUS at 13:32

## 2022-12-08 RX ADMIN — SERTRALINE 100 MG: 50 TABLET, FILM COATED ORAL at 09:32

## 2022-12-08 RX ADMIN — BUSPIRONE HYDROCHLORIDE 15 MG: 5 TABLET ORAL at 13:32

## 2022-12-08 NOTE — PROGRESS NOTES
HYPOGLYCEMIC EPISODE DOCUMENTATION    Patient with hypoglycemic episode(s) at 1408(time) on 12/8/2022(date). BG value(s) pre-treatment 58    Was patient symptomatic?  [x] yes, [] no  Patient was treated with the following rescue medications/treatments: [] D50                [] Glucose tablets                [] Glucagon                [x] 4oz juice                [] 6oz reg soda                [] 8oz low fat milk  BG value post-treatment: 69  Patient was treated with the following rescue medications/treatments: [] D50                [] Glucose tablets                [] Glucagon                [x] 4oz juice                [] 6oz reg soda                [] 8oz low fat milk    BG value post-treatment: 122  Once BG treated and value greater than 80mg/dl, pt was provided with the following:  [x] snack  [] meal

## 2022-12-08 NOTE — PROGRESS NOTES
reviewed the patient's chart prior to the visit. Mr. Thomas Waite had a note on his door.  talked with the Lexii Contreras and she stated he needed to sleep at this time. She stated he hasn't slept in three days.  respected his wishes.  services are available 24 hours a day as requested. Rev. DANIELLA Muniz.  [address] [facility]   Paging Service [phone])

## 2022-12-08 NOTE — PROGRESS NOTES
End of Shift Note    Bedside shift change report given to Rich George (oncoming nurse) by Deepak Thayer (offgoing nurse). Report included the following information SBAR, Kardex, OR Summary, Intake/Output, MAR, Recent Results, and Cardiac Rhythm NSR    Shift worked:  7 am to 7 pm     Shift summary and any significant changes:    Pt worked with PT/OT. Pt walking halls and sitting in recliner. Pt tolerating diet; pt only ate salad off dinner tray and evening BG was 89 so 20 units mealtime insulin was held. Pain meds given once as requested. Concerns for physician to address:  none     Zone phone for oncoming shift:   [phone]       Activity:  Activity Level: Up with Assistance  Number times ambulated in hallways past shift:1  Number of times OOB to chair past shift: 3    Cardiac:   Cardiac Monitoring: Yes      Cardiac Rhythm: Sinus Rhythm    Access:  Current line(s): PIV     Genitourinary:   Urinary status: voiding    Respiratory:   O2 Device: None (Room air)  Chronic home O2 use?: YES  Incentive spirometer at bedside: YES  Actual Volume (ml): 1750 ml    GI:  Last Bowel Movement Date: 12/06/22  Current diet:  ADULT ORAL NUTRITION SUPPLEMENT Breakfast, Lunch, Dinner; Low Calorie/High Protein  ADULT DIET Regular; 4 carb choices (60 gm/meal)  Passing flatus: YES  Tolerating current diet: YES       Pain Management:   Patient states pain is manageable on current regimen: YES    Skin:  Sree Score: 20  Interventions: increase time out of bed and PT/OT consult    Patient Safety:  Fall Score:  Total Score: 3  Interventions: bed/chair alarm, assistive device (walker, cane, etc), gripper socks, and pt to call before getting OOB  High Fall Risk: Yes    Length of Stay:  Expected LOS: 1d 19h  Actual LOS: [address]

## 2022-12-08 NOTE — DIABETES MGMT
[address] [facility]    CLINICAL NURSE SPECIALIST CONSULT     Initial Presentation   Partha Duran is a 36 y.o. male admitted 11/15/22 after experiencing chest pain. Afebrile. Hypertensive. 02 sats 100%  LAB: WBC 8.7. Normal H&H. /AG 3. Normal kidney & liver parameters. NT pro-; troponin 714  CXR:  Mild interstitial pulmonary edema versus interstitial pneumonia. No   pneumothorax. Consider PA and lateral chest views when the patient can better   tolerate. HX:   Past Medical History:   Diagnosis Date    Anxiety and depression     anxiety, depression    Bleeding of eye, left     8/17/21 pt reports receiving injections in right eye for beeding    Chronic headaches 2007    COVID-19 12/20/2020    Diabetes type 1, uncontrolled     since 8years old    GERD (gastroesophageal reflux disease)     Hypercholesterolemia     Hypertension     Hypothyroidism     MI (myocardial infarction) ([facility] [address]     as of 8/17/21 pt reports 9 stents total    WALLY on CPAP       INITIAL DX:   Acute non-Q wave non-ST elevation myocardial infarction (NSTEMI) ([facility] [address] [I21.4]  Aortic stenosis [I35.0]  CAD (coronary artery disease) [I25.10]     Current Treatment     TX: 11/22/22 On pump CORONARY ARTERY BYPASS GRAFT  X 1 WITH LEFT INTERNAL MAMMARY ARTERY to LAD  AORTIC VALVE REPLACEMENT WITH MEYER 25MM INSPIRIS RESILIA TISSUE VALVE   REPAIR OF ASCENDING AORTIC ANEURYSM with 26mm hemashield graft    Consulted by Provider for advanced diabetes nursing assessment and care for:   [x] Transitioning off Trang Croak   [x] Inpatient management strategy  [] Home management assessment  [] Survival skill education    Hospital Course   Clinical progress has been complicated by need for ICU level of care. 11/24/22 Overnight developed hypoxemia. Underwent bronch and SYLWIA, neither of which explained cause for hypoxemia. He required re-intubation and was put on veletri. 11/25/22 Intermittent desaturation events. CXR this a.m. with pulmonary edema  11/27/22 Concern for hepatic ischemia/infarction with progressive trnasaminitis. GI consulted determines severe hepatitis  11/29/22 Sedated on Precedex & Propofol. On C vent support Fi02 50%/Peep 8. Trying to transition off epi to baldev infusions. Will restart Tfs. NG 2 suction at the moment. Plan on diuresing  11/30/22 Follows commands with cough & gag+. On AC vent support Fi02 50%/Peep 8. On Fentanyl infusion; Propofol being weaned. BP managed with epi & baldev infusions. NG to suction & drawing 500cc over past day. KKUB revealed prominent fecal status. Receiving Miralax & Senna; no BM for 14 days. 12/1/22 Anxious. BP & HR up. Plan to extubate this morning. On Spon vent support Fi02 40%/Peep 5. Lots of oral secretions. Remains on baldev & epi infusions fo BP management. NG clamped. Will receive enema today for fecal stasis. 12/2/22 Alert & oriented. Precedex no longer needed for anxiety; will be stopped. O2 via MF. NG remains in place. Plans for Speech evaluation to move to oral feeding. In the meantime, a Dobhoff may be indicated with initial feedings at trickle rate. PT will be started as well. 12/5/22 Alert & oriented. Afebrile. On MF 02; no CPAP overnight. CXR:   Stable cardiac silhouette, prior sternotomy. No acute infiltrate or   shift. Removal of central line. Eating first true meal this morning; ate everything. OOB with assistance & less dizziness per nursing. Moving bowels per patient. 12/6/22 Alert & oriented. Afebrile. On room air. Eating well. OOB to chair. 12/7/2022 A&O. Eating well. Sitting up in the chair. Mother at bedside. 12/8/2022 A&O. Eating well. Laying on visitors bench in room. Mom in room. Patient reports feeling better.       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 Max daily dose of 150 units. Diabetes self-management practices:   Eating pattern - Eats 3 meals on most days and snacks in the evening   [x] Not eating a carbohydrate-controlled mealplan  Physical activity pattern   [x] Not employing a physical activity program to control BG  Monitoring pattern   [x] Testing BGs   [x] Breakfast 300s  [x] Lunch  100-200s  [x] Dinner  100-200s  Taking medications pattern  [x] Consistent administration  [x] Affordable  Overall evaluation:    [x] Not achieving A1c target with drug therapy & self-care practices    Subjective   \"Thanks for everything\"     Objective   Physical exam  General Obese male in no acute distress  Neuro  Alert & oriented  Vital Signs Visit Vitals  BP (!) 114/51 (BP Patient Position: Sitting)   Pulse 90   Temp 99.4 °F (37.4 °C)   Resp 16   Ht 5' 9\" (1.753 m)   Wt 109.6 kg (241 lb 10 oz)   SpO2 95%   BMI 35.68 kg/m²     Laboratory  Recent Labs     12/08/22  0301 12/07/22  0354 12/06/22  0326   * 312* 229*   AGAP 6 4* 7   WBC 8.3 8.7 8.7   CREA 1.06 1.07 1.00   AST 52* 49* 36   * 124* 138*     Factors impacting BG management  Factor Dose Comments   Nutrition:  Easy to Chew   60 gram CHO meals   Eating well   Drugs:  Atypical antipsychotics   Buspar 3XB.  Zoloft D      Pain Oxycodone PRN    Infection  Afebrile. WBC normalized   Other:   Kidney function  Liver function   Normalized  AST/ALT elevated but continues to trend down     Acute Severe Hepatitis per GI     Blood glucose pattern      Significant diabetes-related events over the past 24-72 hours  11/15/22 Admission   Total insulin requirements without achieving target Bgs until 11/20/22:    11/21/22 Started on Gertrude   11/23/22 Using 5-10 units/hr of insulin this morning  11/28/22 Using 2-6 units/hr of insulin  11/29/22 Continues to use 3-6 units/hr of insulin  11/30/22 Using 3.5 units/hr => transitioned off GS  12/1/22 Bgs rising into 200-300s  12/2/22 Change in basal insulin dosing has brought Bgs to goal  12/4/22 Hypoglycemia in the AM  12/6/22 . Eating much better over past 24 hour    Assessment and Plan   Nursing Diagnosis Risk for unstable blood glucose pattern   Nursing Intervention Domain 5250 Decision-making Support   Nursing Interventions Examined current inpatient diabetes/blood glucose control   Explored factors facilitating and impeding inpatient management  Explored corrective strategies with patient and responsible inpatient provider   Informed patient of rational for insulin strategy while hospitalized     Evaluation   This 36year old  male with known CAD was admitted with chest pain; he underwent CABG & AVR. Patient has known Type 1 diabetes with poor control going into this surgery. Patient required 260 units/D prior to the surgery (during this admission) without consistently achieving BG targets. Had been on Gertrude  since surgery 11/22/22; also required vent & pressor support. Abdominal distention related to feces finally resolved with bowel regimen. Extubated to MidFlow 02; now on room air. Not eating well over the weekend but now eating consistently. It is clear his basal insulin needs are less than prior to admission; basal dose reduced. Now eating well.  Recommend adding mealtime insulin. Per[facility]ect Served with [facility] NP.  BG's remain elevated today. The patient is using less insulin than his home dosing. The patient reportedly uses 140 units Toujeo daily and 32 units Humalog and sliding scale with each meal. The patient is now eating well. I recommend an increase in meal time insulin. BG's improving.ealt time insulin increased to 20 units Humalog per meal today. BG's below goal after lunch. The meal time insulin was backed down to 15 units Humalog with each meal. I agree with this strategy. I recommend discharging the patient on the current dosing. The patient should follow-up with Dr. Viktor Sigala for future diabetes management. A note regarding the patient's BG's and recommendation was routed to Dr. Viktor Sigala. Discharge Recommendations   1. Continue Lantus insulin to 60 units twice daily  2. Humalog insulin 15 units at meals  3. Monitor BG's  Follow-up with endocrinology    Billing Code(s)   [x] 91274 IP subsequent hospital care - 15 minutes     Before making these care recommendations, I personally reviewed the hospitalization record, including notes, laboratory & diagnostic data and current medications, and examined the patient at the bedside (circumstances permitting) before making care recommendations. More than fifty (50) percent of the time was spent in patient counseling and/or care coordination.   Total minutes: 15 minutes    LUCY Hale  Diabetes Clinical Nurse Specialist  [facility]  Access via SCM-GL

## 2022-12-08 NOTE — PROGRESS NOTES
Problem: Falls - Risk of  Goal: *Absence of Falls  Description: Document Norman Tabor Fall Risk and appropriate interventions in the flowsheet.   12/7/2022 2043 by Franklin Cortes  Outcome: Progressing Towards Goal  Note: Fall Risk Interventions:  Mobility Interventions: Bed/chair exit alarm, Communicate number of staff needed for ambulation/transfer, OT consult for ADLs, PT Consult for assist device competence, PT Consult for mobility concerns, Patient to call before getting OOB, Utilize walker, cane, or other assistive device    Mentation Interventions: Adequate sleep, hydration, pain control, Bed/chair exit alarm, Update white board    Medication Interventions: Bed/chair exit alarm, Evaluate medications/consider consulting pharmacy, Patient to call before getting OOB, Teach patient to arise slowly    Elimination Interventions: Bed/chair exit alarm, Call light in reach, Patient to call for help with toileting needs, Toileting schedule/hourly rounds, Urinal in reach    History of Falls Interventions: Bed/chair exit alarm, Investigate reason for fall, Room close to nurse's station      12/7/2022 2014 by Franklin Cortes  Outcome: Progressing Towards Goal  Note: Fall Risk Interventions:  Mobility Interventions: Bed/chair exit alarm, Communicate number of staff needed for ambulation/transfer, OT consult for ADLs, PT Consult for assist device competence, PT Consult for mobility concerns, Patient to call before getting OOB, Utilize walker, cane, or other assistive device    Mentation Interventions: Adequate sleep, hydration, pain control, Bed/chair exit alarm, Update white board    Medication Interventions: Bed/chair exit alarm, Evaluate medications/consider consulting pharmacy, Patient to call before getting OOB, Teach patient to arise slowly    Elimination Interventions: Bed/chair exit alarm, Call light in reach, Patient to call for help with toileting needs, Toileting schedule/hourly rounds, Urinal in reach    History of Falls Interventions: Bed/chair exit alarm, Investigate reason for fall, Room close to nurse's station         Problem: Cardiac Valve Surgery: Discharge Outcomes  Goal: *Stable cardiac rhythm  Outcome: Progressing Towards Goal  Goal: *Optimal pain control at patient's stated goal  Outcome: Progressing Towards Goal  Goal: *No signs and symptoms of infection or wound complications  Outcome: Progressing Towards Goal     Problem: Pressure Injury - Risk of  Goal: *Prevention of pressure injury  Description: Document Sree Scale and appropriate interventions in the flowsheet.   12/7/2022 2043 by Zofia Almaraz  Outcome: Progressing Towards Goal  Note: Pressure Injury Interventions:  Sensory Interventions: Assess changes in LOC, Assess need for specialty bed, Float heels, Keep linens dry and wrinkle-free, Maintain/enhance activity level, Minimize linen layers    Moisture Interventions: Absorbent underpads, Minimize layers    Activity Interventions: Chair cushion, Increase time out of bed, Pressure redistribution bed/mattress(bed type), PT/OT evaluation    Mobility Interventions: HOB 30 degrees or less, Pressure redistribution bed/mattress (bed type), PT/OT evaluation, Chair cushion    Nutrition Interventions: Offer support with meals,snacks and hydration, Discuss nutritional consult with provider, Document food/fluid/supplement intake    Friction and Shear Interventions: Apply protective barrier, creams and emollients, Minimize layers             12/7/2022 2014 by Zofia Almaraz  Note: Pressure Injury Interventions:  Sensory Interventions: Assess changes in LOC, Assess need for specialty bed, Float heels, Keep linens dry and wrinkle-free, Maintain/enhance activity level, Minimize linen layers    Moisture Interventions: Absorbent underpads, Minimize layers    Activity Interventions: Chair cushion, Increase time out of bed, Pressure redistribution bed/mattress(bed type), PT/OT evaluation    Mobility Interventions: HOB 30 degrees or less, Pressure redistribution bed/mattress (bed type), PT/OT evaluation, Chair cushion    Nutrition Interventions: Offer support with meals,snacks and hydration, Discuss nutritional consult with provider, Document food/fluid/supplement intake    Friction and Shear Interventions: Apply protective barrier, creams and emollients, Minimize layers                Problem: CABG: Discharge Outcomes  Goal: *Stable cardiac rhythm  Outcome: Progressing Towards Goal  Goal: *Optimal pain control at patient's stated goal  Outcome: Progressing Towards Goal

## 2022-12-08 NOTE — PROGRESS NOTES
Problem: Self Care Deficits Care Plan (Adult)  Goal: *Acute Goals and Plan of Care (Insert Text)  Description:     FUNCTIONAL STATUS PRIOR TO ADMISSION: Patient was independent and active without use of DME.    HOME SUPPORT: The patient lived with his mother. Occupational Therapy Goals  Re-evaluation 12/2/2022  1. Patient will perform grooming sitting edge of bed or unsupported in chair with mod I after set up within 7 day(s). 2.  Patient will tolerate > or = 5 minutes static standing for functional task without UE support within 7 day(s). 3.  Patient will follow move in the tube precaution for bed mobility, sit to stand, stand to sit with 1-2 verbal cues within 7 day(s). 4.  Patient will transfer to and from toilet and complete all toileting tasks with CGA to minimal assist within 7 day(s). 5.  Patient will perform cardiac exercises with handout and min cues within 7 day(s). Initiated 11/23/2022  1. Patient will perform grooming standing at sink with supervision/set-up within 7 day(s). 2.  Patient will perform upper body dressing with supervision/set-up within 7 day(s). 3.  Patient will perform lower body dressing with supervision/set-up within 7 day(s). 4.  Patient will perform toilet transfers with supervision/set-up within 7 day(s). 5.  Patient will perform all aspects of toileting with supervision/set-up within 7 day(s).      Outcome: Progressing Towards Goal   OCCUPATIONAL THERAPY TREATMENT  Patient: Buel Bumpers (84 y.o. male)  Date: 12/8/2022  Diagnosis: Acute non-Q wave non-ST elevation myocardial infarction (NSTEMI) ([facility]) [I21.4]  Aortic stenosis [I35.0]  CAD (coronary artery disease) [I25.10] <principal problem not specified>  Procedure(s) (LRB):  SYLWIA BY DR Sri Josue -  CORONARY ARTERY BYPASS GRAFT  X 1 WITH LEFT INTERNAL MAMMARY ARTERY GRAFTING - AORTIC VALVE REPLACEMENT WITH MEYER 25MM INSPIRIS RESILIA TISSUE VALVE AND REPAIR OF ASCENDING AORTIC ANEURYSM (N/A) 16 Days Post-Op  Precautions: Sternal (move in the tube)  Chart, occupational therapy assessment, plan of care, and goals were reviewed. ASSESSMENT  Patient continues with skilled OT services and is progressing towards goals. Patient is received in personal clothing, readily agreeable to session and seeming eager to progress independence. Patient participates in bathroom-level grooming, benefiting from min cues for proximity to sink and keeping elbow vs automatic  of asymmetrical UE WB/pushing for stabilization. Patient doffs personal clothing in seated/standing, requiring brief rest breaks between upper-body and lower-body aspects of task performance 2/2 fatigue and dyspnea. Patient dons clean set of personal clothing, to include pullover shirt, underwear, shorts, and tennis shoes with minimal cues needed for effective use of compensatory technique. Patient continues to make significant gains. Pending progress, anticipate patient may be able to return home with [facility] and 24/7 supervision/assist if this is his preference. Patient is verbalizing and is demonstrating understanding of mindful-based movements (\"move in the tube\") principles of keeping UEs proximal to ribcage to prevent lateral pull on the sternum during load-bearing activities with verbal cues required for compliance. Current Level of Function Impacting Discharge (ADLs): up to min A    Other factors to consider for discharge: supportive family, present for education/training today         PLAN :  Patient continues to benefit from skilled intervention to address the above impairments. Continue treatment per established plan of care to address goals.     Recommend with staff: OOB to chair, grooming/toileting in bathroom, assist x1 for safety    Recommendation for discharge: (in order for the patient to meet his/her long term goals)  To be determined: IPR vs  with 24/7 supervision/assist    This discharge recommendation:  Has not yet been discussed the attending provider and/or case management    IF patient discharges home will need the following DME: TBD       SUBJECTIVE:   Patient stated I'd like to go home.     OBJECTIVE DATA SUMMARY:   Cognitive/Behavioral Status:  Neurologic State: Alert; Appropriate for age  Orientation Level: Oriented X4  Cognition: Follows commands  Perception: Appears intact  Perseveration: No perseveration noted  Safety/Judgement: Awareness of environment; Fall prevention;Home safety;Decreased awareness of need for safety    Functional Mobility and Transfers for ADLs:  Bed Mobility:       Transfers:  Sit to Stand: Stand-by assistance  Functional Transfers  Bathroom Mobility: Stand-by assistance  Cues: Verbal cues provided     Balance:  Sitting: Intact  Standing: Impaired  Standing - Static: Good  Standing - Dynamic : Good    ADL Intervention:  Grooming  Grooming Assistance: Stand-by assistance  Position Performed: Standing  Washing Face: Supervision;Set-up  Washing Hands: Supervision;Set-up  Brushing Teeth: Supervision;Set-up  Cues: Verbal cues provided;Visual cues provided (for proximity to sink; maintaining elbows tucked if weightbearing through UE)    Upper Body Dressing Assistance  Dressing Assistance: Set-up; Stand-by assistance  Pullover Shirt: Stand-by assistance; Compensatory technique training  Cues: Verbal cues provided;Visual cues provided (for BUE movement in sync)    Lower Body Dressing Assistance  Dressing Assistance: Stand-by assistance  Underpants: Stand-by assistance; Compensatory technique training  Pants With Elastic Waist: Stand-by assistance; Compensatory technique training  Shoes with Cloth Laces: Stand-by assistance; Compensatory technique training  Leg Crossed Method Used: Yes  Position Performed: Seated in chair;Supine  Cues: Verbal cues provided;Visual cues provided;Mik;Marcial    Cognitive Retraining  Problem Solving: Identifying the problem; Identifying the task;General alternative solution  Executive Functions: Executing cognitive plans  Organizing/Sequencing: Breaking task down  Safety/Judgement: Awareness of environment; Fall prevention;Home safety;Decreased awareness of need for safety    Patient instructed and educated on mindful movement principles based on Move in The Tube concept to include maintaining bilateral elbows close to rib cage when performing any load-bearing activity such as getting in/out of bed, pushing up from a chair, opening a door, or lifting a box. Patient was given a handout with diagrams of each correct/incorrect method of performing each of the above tasks. Patient instructed on the ability to utilize upper extremities outside the tube when doing any non-load bearing activity such as washing hair/body, brushing teeth, retrieving clothing items, or scratching your back. Patient encouraged to also perform upper extremity exercises \"outside of the tube\" to prevent scar tissue formation around sternal incision site. Patient instructed in detail about activities to heed with caution, allowing pain to be the guide. These activities include but are not limited to: mowing the lawn, riding a bike, walking a dog, lifting a child, workshop hobbies, golfing, sexual activity, vacuuming, fishing, scrubbing the floors, and moving furniture. Patient was given the [address] [place] handout to describe each of these activities in detail. Patient instructed no asymmetrical reaching over head to ensure B UEs when shoulders >90* i.e. reaching in cabinets and dressing. Instruction on upper body dressing techniques of over head, then arms through to decrease pain and unilateral shoulder flexion >90*. Instruction on the benefits of utilizing B UEs during functional tasks i.e. opening the fridge, stepping into the tub. Instruction if continued pain at home with shoulder IR for BM hygiene can use wet wipes and toilet tongs PRN. Avoid valsalva maneuvers.   May have to adjust home setup to increase ease with items closer to waist height to prevent deep bending and the automatic  of asymmetrical UE WB/pushing for stabilization during bending. Benefit to don clothing tailor sitting and don all clothing while sitting prior to standing. Patient demonstrated lower body dressing with Stand-by assistance. Instruction and indicated understanding on the benefits of loose clothing throughout to accommodate for post surgical swelling, decreased ROM and increased pain. Instruction and indicated understanding the technique of pull over shirt versus front open clothing. Increase activity tolerance for home, work, and sexual intercourse by pacing self with increasing the arm exercises, sitting duration, frequency OOB, walking, standing, and ADLs. Instructed and indicated understanding of s/s of too much activity, how to respond to s/s safely. Therapeutic Exercises:   Patient instructed on the benefits and demonstrated cardiac exercises while seated. Instructed and indicated understanding on how to progress reps, sets against gravity, pacing through progressive muscle strengthening standing based on surgeon clearance for more weight in prep for basic and instrumental ADLs. Instruction on the use of household items in place of weights as needed.     CARDIAC   EXERCISE    Sets    Reps    Active  Active Assist    Passive  Self ROM    Comments    Shoulder flexion  1  5   []                            [x]                             []                             []                             To achieve upper ranges; baseline shoulder deficits; benefits from holding hands together at midline   Shoulder abduction  1  5  []                             [x]                             []                             []                                Scapular elevation  1  5  []                             []                              []                             []                                Scapular retraction  1 5  []                             []                             []                             []                                Trunk rotation  1  5  []                             []                             []                             []                                Trunk sidebending  1  5  []                             []                              []                             []                                          Pain:  No reports. Activity Tolerance:   Fair    After treatment patient left in no apparent distress:   Call bell within reach, Caregiver / family present, and Seated in windowseat as initially received    COMMUNICATION/COLLABORATION:   The patients plan of care was discussed with: Physical therapist and Registered nurse.      Dima Wheat OT  Time Calculation: 39 mins

## 2022-12-08 NOTE — PROGRESS NOTES
Problem: Mobility Impaired (Adult and Pediatric)  Goal: *Acute Goals and Plan of Care (Insert Text)  Description: FUNCTIONAL STATUS PRIOR TO ADMISSION: Patient was independent and active without use of DME. Still driving. Denies home O2 use. States he is active at baseline, performing yard work and working part-time as a . HOME SUPPORT PRIOR TO ADMISSION: The patient lived with mother. Physical Therapy Goals  Revised 12/2/2022  1. Patient will move from supine to sit and sit to supine  in bed with supervision/set-up within 7 days. 2.  Patient will perform sit to/from stand with CGA within 7 days. 3.  Patient will ambulate 200 feet with least restrictive assistive device and contact guard assist within 7 days. 4.  Patient will ascend/descend 3 stairs with 1 handrail(s) with contact guard assist within 7 days. 5.  Patient will perform cardiac exercises per protocol with independence within 7 days. 6.  Patient will verbally recall and functionally demonstrate mindful-based movements (\"move in the tube\") principles without cues within 7 days. Physical Therapy Goals  Initiated 11/23/2022  1. Patient will move from supine to sit and sit to supine , scoot up and down, and roll side to side in bed with modified independence within 5 days. 2.  Patient will perform sit to/from stand with modified independence within 5 days. 3.  Patient will ambulate 300 feet with least restrictive assistive device and independence within 5 days. 4.  Patient will ascend/descend 3 stairs with 1 handrail(s) with modified independence within 5 days. 5.  Patient will perform cardiac exercises per protocol with independence within 5 days. 6.  Patient will verbally and functionally demonstrate 3/3 sternal precautions without cues within 5 days.          Outcome: Progressing Towards Goal   PHYSICAL THERAPY TREATMENT  Patient: Pino Valenzuela (2 y.o. male)  Date: 12/8/2022  Diagnosis: Acute non-Q wave non-ST elevation myocardial infarction (NSTEMI) ([facility]) [I21.4]  Aortic stenosis [I35.0]  CAD (coronary artery disease) [I25.10] <principal problem not specified>  Procedure(s) (LRB):  SYLWIA BY DR Elsie Langley -  CORONARY ARTERY BYPASS GRAFT  X 1 WITH LEFT INTERNAL MAMMARY ARTERY GRAFTING - AORTIC VALVE REPLACEMENT WITH MEYER 25MM INSPIRIS RESILIA TISSUE VALVE AND REPAIR OF ASCENDING AORTIC ANEURYSM (N/A) 16 Days Post-Op  Precautions: Sternal (move in the tube)  Chart, physical therapy assessment, plan of care and goals were reviewed. ASSESSMENT  Patient continues with skilled PT services and is progressing towards goals, demonstrating significant improvements in activity tolerance, strength, balance, motivation, insight, and overall mobility. All mobility occurred at supervision/standby assist level this date including sit<>stand transfer and ambulation with and without RW support. Initial ambulation trial of 150ft occurred with RW support with pt exhibiting decreased gait speed however steady gait overall. Following brief standing rest break, pt ambulated an additional 150ft without RW support, requiring SBA. Gait speed remained steady however no overt LOB noted. VSS on RA. Given significant improvements demonstrated this date, pt is progressing towards being appropriate to return home w/ HHPT and 24hr assist from family. Will continue to progress ambulation without support of RW during therapy however would recommend continued use of RW when mobilizing with nursing staff. Will assess safety during stair climbing during next therapy session. Patient is verbalizing and is not demonstrating understanding of mindful-based movements (\"move in the tube\") principles of keeping UEs proximal to ribcage to prevent lateral pull on the sternum during load-bearing activities with verbal cues required for compliance.     Current Level of Function Impacting Discharge (mobility/balance): supervision/standby assist    Other factors to consider for discharge: improving motivation and activity tolerance, needs 24hr supervision         PLAN :  Patient continues to benefit from skilled intervention to address the above impairments. Continue treatment per established plan of care. to address goals. Recommendation for discharge: (in order for the patient to meet his/her long term goals)  Physical therapy at least 2 days/week in the home  w/ 24hr assist of mom and/or girlfriend    This discharge recommendation:  Has been made in collaboration with the attending provider and/or case management    IF patient discharges home will need the following DME: rolling walker       SUBJECTIVE:   Patient stated I couldn't sleep last night so I was just walking.     OBJECTIVE DATA SUMMARY:   Patient mobilized on continuous portable monitor/telemetry.   Critical Behavior:  Neurologic State: Alert  Orientation Level: Oriented to person  Cognition: Follows commands  Safety/Judgement: Insight into deficits, Decreased awareness of need for safety    Functional Mobility Training:  Bed Mobility:         Seated on window seat upon arrival             Transfers:  Sit to Stand: Supervision  Stand to Sit: Supervision                               Balance:  Sitting: Intact  Standing: Impaired  Standing - Static: Good  Standing - Dynamic : Good    Ambulation/Gait Training:  Distance (ft): 300 Feet (ft) (150ft w/ RW and supervision, 150ft w/ no AD and SBA)  Assistive Device: Walker, rolling;Gait belt (150ft w/ RW and supervision, 150ft w/ no AD and SBA)  Ambulation - Level of Assistance: Stand-by assistance        Gait Abnormalities: Decreased step clearance        Base of Support: Widened     Speed/Juliet: Pace decreased (<100 feet/min)  Step Length: Left shortened;Right shortened                     Cardiac diagnosis intervention:  Patient instructed and educated on mindful movement principles based on Move in The Tube concept to include maintaining bilateral elbows close to rib cage when performing any load-bearing activity such as getting in/out of bed, pushing up from a chair, opening a door, or lifting a box. Patient was given a handout with diagrams of each correct/incorrect method of performing each of the above tasks. Therapeutic Exercises:   Patient instructed on the benefits and demonstrated cardiac exercises while seated with Supervision. Instructed and indicated understanding on how to progress reps, sets against gravity, pacing through progressive muscle strengthening standing based on surgeon clearance for more weight in prep for functional activity. Instruction on the use of household items in place of weights as needed.      CARDIAC  EXERCISE   Sets   Reps   Active Active Assist   Passive Self ROM   Comments   Shoulder flexion 1 10 [x]                                            []                                            []                                            []                                               Shoulder abduction 1     10 [x]                                            []                                            []                                            []                                               Scapular elevation 1 10 [x]                                            []                                            []                                            []                                               Scapular retraction 1 10 [x]                                            []                                            []                                            []                                               Trunk rotation 1 10 [x]                                            []                                            []                                            [x]                                               Trunk sidebending 1 10 [x]                                            [] []                                            []                                                  []                                            []                                            []                                            []                                                   Pain Rating:  Denied c/o pain    Activity Tolerance:   VSS on RA    After treatment patient left in no apparent distress:   Seated on window seat, call bell within reach, RN notified, caregivers at bedside    COMMUNICATION/COLLABORATION:   The patients plan of care was discussed with: Occupational therapist and Registered nurse.      Emma Dang, PT, DPT   Time Calculation: 26 mins

## 2022-12-08 NOTE — PROGRESS NOTES
Problem: Dysphagia (Adult)  Goal: *Acute Goals and Plan of Care (Insert Text)  Description: Speech path goals  1. Patient will tolerate ice chips only by small amount with staff. Goal met 12/3/22  2. Patient will participate with reeval of swallowing. Completed 12/3/22  3. Patient will tolerate easy to chew and nectar thick liquids with no overt s/s of aspiration. MET  Increase to thin liquids. MET  4. Patient will participate with imaging of swallowing as needed. Discontinue  5. Patient will tolerate regular texture diet with thin liquids without clinical s/s of aspiration within seven days. Goal initiated 12/6/22. MET  Outcome: Resolved/Met     SPEECH LANGUAGE PATHOLOGY DYSPHAGIA TREATMENT/DISCHARGE  Patient: Alexa Mooney (40 y.o. male)  Date: 12/8/2022  Diagnosis: Acute non-Q wave non-ST elevation myocardial infarction (NSTEMI) ([facility]) [I21.4]  Aortic stenosis [I35.0]  CAD (coronary artery disease) [I25.10] <principal problem not specified>  Procedure(s) (LRB):  SYLWIA BY DR Cristina Isbell -  CORONARY ARTERY BYPASS GRAFT  X 1 WITH LEFT INTERNAL MAMMARY ARTERY GRAFTING - AORTIC VALVE REPLACEMENT WITH MEYER 25MM INSPIRIS RESILIA TISSUE VALVE AND REPAIR OF ASCENDING AORTIC ANEURYSM (N/A) 16 Days Post-Op  Precautions:  Sternal (move in the tube)    ASSESSMENT:  Patient with [facility] oral/pharyngeal swallow, and no overt s/s aspiration observed. Patient with no concerns regarding his swallow function except for a sore throat which is improving. PLAN:  -Regular/thin liquid diet  Patient will be discharged from acute skilled speech therapy at this time. Rationale for discharge:  Goals achieved    Discharge Recommendations:  None     SUBJECTIVE:   Patient stated It's still a little sore so I can't eat sausage or some salad dressings.     OBJECTIVE:   Cognitive and Communication Status:  Neurologic State: Alert  Orientation Level: Oriented to person  Cognition: Follows commands    Perception: Appears intact Perseveration: Perseverates during mobility (on moaning)    Safety/Judgement: Insight into deficits, Decreased awareness of need for safety  Dysphagia Treatment:     P.O. Trials:  Patient Position: sitting on couch  Vocal quality prior to P.O.: No impairment  Consistency Presented: Thin liquid; Solid  How Presented: Self-fed/presented; Successive swallows;Cup/gulp     Bolus Acceptance: No impairment  Bolus Formation/Control: No impairment     Propulsion: No impairment  Oral Residue: None        Aspiration Signs/Symptoms: None  Pharyngeal Phase Characteristics: No impairment, issues, or problems            NOMS:   The NOMS functional outcome measure was used to quantify this patient's level of swallowing impairment. Based on the NOMS, the patient was determined to be at level 7 for swallow function     NOMS Swallowing Levels:  Level 1 (CN): NPO  Level 2 (CM): NPO but takes consistency in therapy  Level 3 (CL): Takes less than 50% of nutrition p.o. and continues with nonoral feedings; and/or safe with mod cues; and/or max diet restriction  Level 4 (CK): Safe swallow but needs mod cues; and/or mod diet restriction; and/or still requires some nonoral feeding/supplements  Level 5 (CJ): Safe swallow with min diet restriction; and/or needs min cues  Level 6 (CI): Independent with p.o.; rare cues; usually self cues; may need to avoid some foods or needs extra time  Level 7 ([address] [place] [address]): Independent for all p.o.  BAUDILIO. (2003). National Outcomes Measurement System (NOMS): Adult Speech-Language Pathology User's Guide. Pain:  Pain Scale 1: Numeric (0 - 10)  Pain Intensity 1: 4  Pain Location 1: Incisional    After treatment:   Patient left in no apparent distress sitting up in chair, Call bell within reach, Nursing notified, and Caregiver / family present    COMMUNICATION/EDUCATION:   Patient was educated regarding his deficit(s) of WFL swallow as this relates to his diagnosis.   He demonstrated Good understanding as evidenced by verbalizing understanding.     The patient's plan of care including recommendations, planned interventions, and recommended diet changes were discussed with: Speech therapist.     LUIS Fernandez  Time Calculation: 10 mins

## 2022-12-08 NOTE — PROGRESS NOTES
Cardiac Surgery End of Shift Report PCU    Vitals:  Patient Vitals for the past 4 hrs:   Temp Pulse Resp BP SpO2   12/08/22 0302 98.2 °F (36.8 °C) 84 18 (!) 115/50 98 %          EKG/Rhythm:   NSR                  External Pacemaker:  NO                  Pacer wires Capped?: YES wire care completed    Oxygen therapy:   Oxygen Delivery:   room air    ISS Teaching Yes   Use : YES     Volume: 1500mL      Lines & Drains:  Peripheral Intravenous Line:   Peripheral IV 12/08/22 Anterior;Proximal;Right Forearm (Active)   Site Assessment Clean, dry, & intact 12/08/22 0324   Phlebitis Assessment 0 12/08/22 0324   Infiltration Assessment 0 12/08/22 0324   Dressing Status Clean, dry, & intact; New 12/08/22 0324   Dressing Type Transparent 12/08/22 0324   Hub Color/Line Status Pink;Patent; Flushed;End cap changed 12/08/22 0324   Action Taken Blood drawn 12/08/22 0324   Alcohol Cap Used Yes 12/08/22 0324         Cardiac drips?: n/a      12 hour Output:     12 hour Chest Tube Output: n/a     12 Hour Urine Output: 1600mL                                  Daily Weight: 109.6kg    Skin/Wounds:  Wound Hand Right (Active)   Number of days: 1456       Incision 08/26/21 Finger (Comment which one) Right (Active)   Number of days: 469       Incision 11/22/22 Chest (Active)   Dressing Status Clean;Dry; Intact 12/07/22 1701   Cleansed Soap and water 12/07/22 1701   Dressing/Treatment Other (Comment) 12/07/22 2030   Closure Surgical glue 12/07/22 1701   Margins Approximated 12/07/22 2030   Drainage Amount None 12/07/22 1701   Wound Odor None 12/07/22 1701   Gayle-Wound/Incision Assessment Intact 12/07/22 0311   Number of days: 16           Pain Control:   oxycodone x1    Activity:   Up in chair YES ; 3 times,  Duration most of shift         Ambulated YES; Distance 30 ft , 2 times    Hygiene: CHG bath YES SHOWER NO      Concerns/ Communication:

## 2022-12-08 NOTE — DISCHARGE SUMMARY
[facility] Discharge Summary     Patient ID:  Tyson Garcia  [id number]  81 y.o.  1980    Admit date: 11/15/2022    Discharge date: 12/9/22    Admitting Physician: Corazon Webb MD     Referring Cardiologist:  Dr. Tyrone Piedra    PCP:  Antonio Byrne MD    Admitting Diagnoses: AS, CAD    Discharge Diagnoses:     Hospital Problems  Date Reviewed: 10/13/2022            Codes Class Noted POA    Aortic stenosis ICD-10-CM: I35.0  ICD-9-CM: 424.1  11/22/2022 Unknown        CAD (coronary artery disease) ICD-10-CM: I25.10  ICD-9-CM: 414.00  11/22/2022 Unknown        S/P CABG x 1 ICD-10-CM: Z95.1  ICD-9-CM: V45.81  11/22/2022 Unknown    Overview Signed 11/22/2022  2:23 PM by LOLLY Ashby     On pump CORONARY ARTERY BYPASS GRAFT  X 1 WITH LEFT INTERNAL MAMMARY ARTERY to LAD  AORTIC VALVE REPLACEMENT WITH MEYER 25MM INSPIRIS RESILIA TISSUE VALVE   REPAIR OF ASCENDING AORTIC ANEURYSM with 26mm hemashield graft             S/P AVR (aortic valve replacement) and aortoplasty ICD-10-CM: Z95.2  ICD-9-CM: V43.3  11/22/2022 Unknown    Overview Signed 11/22/2022  2:23 PM by LOLLY Ashby     On pump CORONARY ARTERY BYPASS GRAFT  X 1 WITH LEFT INTERNAL MAMMARY ARTERY to LAD  AORTIC VALVE REPLACEMENT WITH MEYER 25MM INSPIRIS RESILIA TISSUE VALVE   REPAIR OF ASCENDING AORTIC ANEURYSM with 26mm hemashield graft             Acute non-Q wave non-ST elevation myocardial infarction (NSTEMI) ([facility] [address] ICD-10-CM: I21.4  ICD-9-CM: 410.70  11/15/2022 Unknown           Discharged Condition: good and stable    Disposition: home, see patient instructions for treatment and plan    Procedures for this admission:  Procedure(s):  SYLWIA BY DR Jay Toscano -  CORONARY ARTERY BYPASS GRAFT  X 1 WITH LEFT INTERNAL MAMMARY ARTERY GRAFTING - AORTIC VALVE REPLACEMENT WITH MEYER 25MM INSPIRIS RESILIA TISSUE VALVE AND REPAIR OF ASCENDING AORTIC ANEURYSM    Discharge Medications:      My Medications        ASK your doctor about these medications Instructions Each Dose to Equal Morning Noon Evening Bedtime   alum-mag hydroxide-simeth 200-200-20 mg/5 mL Susp  Commonly known as: MYLANTA    Your last dose was: Your next dose is: Take 30 mL by mouth every four (4) hours as needed for Indigestion. 30 mL                 aspirin delayed-release 81 mg tablet    Your last dose was: Your next dose is: Take 1 Tab by mouth daily. 81 mg                 BD Insulin Syringe 1 mL 25 x 1\" Syrg  Generic drug: Insulin Syringe-Needle U-100    Your last dose was: Your next dose is:         Use for drawing up/injecting testosterone once weekly. BD Insulin Syringe U-500 1/2 mL 31 gauge x 15/64\" Syrg  Generic drug: insulin U-500 syringe-needle    Your last dose was: Your next dose is: Three shots per day. busPIRone 15 mg tablet  Commonly known as: BUSPAR    Your last dose was: Your next dose is: Take 1 Tablet by mouth three (3) times daily. 15 mg                 butalbital-acetaminophen-caffeine -40 mg per tablet  Commonly known as: FIORICET, ESGIC    Your last dose was: Your next dose is: Take 1 Tablet by mouth every six (6) hours as needed for Headache. Indications: a migraine headache   1 Tablet                 cholecalciferol (5000 Units/125 mcg) Tab tablet  Commonly known as: Vitamin D3    Your last dose was: Your next dose is: Take 1 Tab by mouth daily. 5,000 Units                 clopidogreL 75 mg Tab  Commonly known as: PLAVIX    Your last dose was: Your next dose is: Take 1 Tablet by mouth daily. 75 mg                 esomeprazole 40 mg capsule  Commonly known as: [address]    Your last dose was: Your next dose is:         TAKE 1 CAPSULE BY MOUTH ONCE DAILY BEFORE BREAKFAST DO NOT OPEN CAPSULE                  famotidine 40 mg tablet  Commonly known as: PEPCID    Your last dose was: Your next dose is:          Take 1 Tablet by mouth daily. 40 mg                 Insulin Needles (Disposable) 32 gauge x 5/32\" Ndle    Your last dose was: Your next dose is:         5 shots per day (dispense whatever brand is covered by his insurance)                  insulin regular 100 unit/mL injection  Commonly known as: Ermelinda Drought R, HUMULIN R    Your last dose was: Your next dose is:         40 units with each meal, plus correction. Max daily dose of 150 units. levothyroxine 125 mcg tablet  Commonly known as: SYNTHROID    Your last dose was: Your next dose is: Take 1 Tablet by mouth Daily (before breakfast). 125 mcg                 lidocaine 4 % topical cream  Commonly known as: LMX 4    Your last dose was: Your next dose is:         Apply  to affected area two (2) times daily as needed for Pain. lisinopriL 10 mg tablet  Commonly known as: Cristina Sax    Your last dose was: Your next dose is: Take 10 mg by mouth daily. 10 mg                 LORazepam 1 mg tablet  Commonly known as: ATIVAN    Your last dose was: Your next dose is: Take 1 Tablet by mouth every eight (8) hours as needed for Anxiety. Max Daily Amount: 3 mg.   1 mg                 metFORMIN 500 mg tablet  Commonly known as: GLUCOPHAGE    Your last dose was: Your next dose is:         TAKE 1 TABLET BY MOUTH TWICE DAILY WITH MEALS                  metoprolol succinate 25 mg XL tablet  Commonly known as: TOPROL-XL    Your last dose was: Your next dose is: Take 25 mg by mouth two (2) times a day. 25 mg                 nitroglycerin 0.4 mg SL tablet  Commonly known as: NITROSTAT    Your last dose was: Your next dose is: Take 1 Tab by mouth every five (5) minutes as needed for Chest Pain. Sit down then put one tab under the tongue every 5 minutes as needed   0.4 mg                 rosuvastatin 40 mg tablet  Commonly known as: CRESTOR    Your last dose was:      Your next dose is: Take 40 mg by mouth daily. 40 mg                 * sertraline 100 mg tablet  Commonly known as: ZOLOFT    Your last dose was: Your next dose is: Take 1 Tablet by mouth daily. 100 mg                 * sertraline 50 mg tablet  Commonly known as: ZOLOFT    Your last dose was: Your next dose is: Take 1 Tablet by mouth daily. 50 mg                 testosterone 20.25 mg/1.25 gram (1.62 %) gel  Commonly known as: ANDROGEL    Your last dose was: Your next dose is:         Apply 4 pump presses daily. Ok to dispense generic testosterone. Toujeo Max U-300 SoloStar 300 unit/mL (3 mL) Inpn  Generic drug: insulin glargine U-300 conc    Your last dose was: Your next dose is: Take 140 units every day (new dosage)                        * This list has 2 medication(s) that are the same as other medications prescribed for you. Read the directions carefully, and ask your doctor or other care provider to review them with you. HPI:  36 y.o. male who presents with past medical history of coronary artery disease, hypertension, XOL, diabetes mellitus, extensive history of stents, GERD, WALLY not on CPAP, former smoker, hypothyroidism, anxiety and depression, that is referred to the [address] [place] [address] by Dr. Zachary Cali for interventional evaluation of AS. Patient presented to the emergency department on 11/15/22 with c/o CP and SOB. Patient has been having issues with the same since 2019. Around 2019, he had 8 stents placed; he later needed another stent in 2021. Presented to emergency department again on 9/15/22 with CP, but was sent home when his troponin and EKG were negative. They told him at that time that it may be heart burn. Since then, he has been working with Dr. Jennifer Moncada with GI at [facility]. He underwent a Upper GI with small bowel follow-through Double contrast on 22, which showed the followin.  Mild esophageal dysmotility. 2. Gastric wall thickening may be exaggerated by incomplete distention, but gastritis is possible. Superimposed subcentimeter ulcerations in the distal stomach not excluded. Recommend further evaluation with endoscopy. 3. Mildly patulous thoracic esophagus with mild tapering distally. Apparent mural irregularity in the distal esophagus, seen only on a single image. This could also be better assessed endoscopically. 4. Borderline rapid small bowel transit time. No evidence of small bowel obstruction. 5. Patchy opacity in the left lung base. Recommend dedicated chest radiograph. (C&P from [facility] note)     He was started on PPIs (Nexium in the morning; 1 Prilosec in the afternoon and 1 Prilosec at bedtime; additionally, he was going through 1.5 bottles of Maalox per week) but continued to have symptoms, which he described as a pain in the epigastric area, that radiated to the back and down both arms; it would actually start to feel like both arms would go numb. Maalox and rest would sometimes give him relief; any kind of activity would make the discomfort worse. Plan was for a scope, which he was in the process of scheduling the day of admission. Went to see his primary care physician (Dr. Cesar Barnett) again on the day of admission and was noted to have typical cardiac chest pain (substernal, pressure-like pain, radiating to the left arm) along with some shortness of breath for which she was advised to go to the emergency department. CP was associated with some exertional shortness of breath but not much cough or phlegm. On arrival to ED, vital signs were within normal. On labs CBC was normal.  BMP showed sodium of 132, glucose of 297, creatinine of 1.10. Troponin was 714 and  proBNP was 208. Chest x-ray showed mild interstitial pulmonary edema versus interstitial pneumonia no pneumothorax. He was admitted for acute NSTEMI, and CHF r/o.  He was started on a heparin gtt and home ASA, Plavix, BB and statin were resumed. Cardiology was consulted, an echocardiogram was ordered and he was given a one time dose of IV Lasix. He developed CP again overnight which was treated with nitro paste. He had a LHC today which showed Patent LAD stent with mild to moderate diffuse disease, jailed small diagonal branch with severe ostial narrowing, mild RCA, severe aortic stenosis with mean gradient 50.50 mm Hg, an elevated LVEDP, and a dilated ascending aorta. Endorses dizziness/lightheadedness, fatigue, CP, and SOB. Symptoms are intermittent. Denies syncope, PND, claudication, and LE edema. Endorses thyroid dz, tobacco abuse (Smoked half PPD x 18 years. Quit in ), family hx of cardiac dx (father- first MI at 48-  from [address]; maternal grandfather  at 62 from MI), and WALLY (not on CPAP)  Denies hx of CVA/TIA, difficulty swallowing, lung dz, previous thoracic surgery, liver dz, alcohol abuse, drug abuse, kidney dz, and PAD/PVD or vein stripping     Functional status: Independent and ambulatory without assistive devices   Activity level: Assists mother around the house and yard. Social support:  He is . He lives with his mother. Occupation: He is in the process of trying to obtain disability   COVID vaccination status: Unvaccinated      (+) Medication changes in past 3 months  (-) Blood/Blackness/Tarriness of stools  Heart Failure Admission w/in past year: [facility]- echo pending   Heart Failure Admission w/in past 2 weeks: See above     NYHA Classification: III              Class I (Mild): No limitation of physical activity. Ordinary physical activity does not cause undue fatigue, palpitation, or dyspnea. Class II (Mild): Slight limitation of physical activity. Comfortable at rest, but ordinary physical activity results in fatigue, palpitation, or dyspnea. Class III (Moderate): Marked limitation of physical activity.  Comfortable at rest, but less than ordinary activity causes fatigue, palpitation, or dyspnea              Class IV (Severe): Unable to carry out any physical activity without discomfort. Symptoms  of cardiac insufficiency at rest.  If any physical activity is undertaken, discomfort is increased. Angina Classification: 3              Class 0: No symptoms              Class 1: Angina with strenuous exercise              Class 2: Angina with moderate exercise              Class 3: Angina with mild exertion              Walking 1-2 level blocks at normal pace; Climbing 1 flight of stairs at normal pace  Class 4: Angina at any level of physical exertion          Hospital Course:  Pt underwent On pump CORONARY ARTERY BYPASS GRAFT  X 1 WITH LEFT INTERNAL MAMMARY ARTERY to LAD  AORTIC VALVE REPLACEMENT WITH MEYER 25MM INSPIRIS RESILIA TISSUE VALVE, REPAIR OF ASCENDING AORTIC ANEURYSM with 26mm hemashield graft  on 11/22/22 with Dr. Raoul Orourke. Pt was transferred to the ICU in stable condition on Epinephrine, Neosynephrine, Precedex, and Insulin gtts. Pt was extubated night of surgery. POD 1 pt with fluid overload, worsening crt, transaminitis. That evening pt became hypoxic requiring Bipap and was ultimately re-intubated at 0200 for acute respiratory failure. Bronchoscopy was completed and was unremarkable. CXR  SYLWIA was completed this am with normal RV and LVF without effusion or shunting. Initial rapid COVID was positive, subsequently 2 negative PCRs. All cultures and viral panels negative. CTA chest neg. Liver slowly began to recover and return to baseline. Pt was extubated second time on POD# 9 to hi-flow. Oxygen was weaned and removed finally on POD # 14. Endurance was a lee postop, he struggled with SOB/COLE. Pt was working hard on his mobility and was found safe to discharge home with 24/7 assistance and in home therapy.  Pt found medically ready and safe for discharge home on 12/9/22 with the following plan:    POD#17:  Severe symptomatic AS, s/p tissue AVR. Continue ASA. CAD, STEMI, s/p CABG:ASA, statin, lisinopril,  and beta blocker. Repeat Echo without effusion or tamponade, NL LV/RVF. On statin. Acute hypoxic respiratory failure: Cont diuresis, hourly IS. Nebs BID and PRN. Flutter valve. On RA  SERGE: Crt up to 2.01. Nephrology consult appreciated, avoid nephrotoxins, trend. Crt 1.12 this am. Cont Lasix 40 mg daily PO  Acute blood loss anemia: improving, H&H up to 9.5/31 this am  Transaminitis. Acute Severe Hepatitis, GI consult appreciated, + hepatomegaly and splenomegaly. Viral panel neg. Triple phase liver scan completed 11/30. No hepatic or splenic ischemia noted. + hepatic steatosis. Labs cont to trend down. No amiodarone. Statin restarted. Leukocytosis: resolved. BC, Sputum and urine cultures NGTD. ID following, Completed Meropenem. Rapid COVID pos, 2 PCR negative. WBC up to 22.6, 8.5 today, Afebrile. HTN: metoprolol. Will need to restart lisinopril when BP is more robust and creatinine normal. Preop creatinine was 0.97. HLD: statin restarted. Check LFTs in a month. WALLY , not on CPAP. Was unable to tolerate due to anxiety; he has been working on this with Sleep Medicine. OP follow-up. refusing CPAP  DMII. On Toujeo at home. Repeat A1C 9.0. Diabetes management following. Cont lantus and mealtime coverage. Call and text out to Diabetes Management CNS, Kaveh Morales for input for discharge insulin orders. GERD. Continue PPI. Anxiety and depression. On Buspar, Zoloft; continue. Supportive care. Ileus: resolved. + BMs. Reduce bowel regimen. Nutrition:carb controlled diet. Had discussion with patient regarding diet. Recommended he learn to count carbs and limit carbs to 60 grams per meal.  Discussed the possible effect of uncontrolled diabetes on wound healing. Deconditioning/mobility: OOB TID, ambulating BID. Arm exercises. Home PT. Discharge to home today with home PT and home health nursing.    Signed By: Ana Lau Gali Shankar NP       Referral to outpatient cardiac rehab made. Discharge Vital Signs: Visit Vitals  BP (!) 114/51 (BP Patient Position: Sitting)   Pulse 90   Temp 99.4 °F (37.4 °C)   Resp 16   Ht 5' 9\" (1.753 m)   Wt 241 lb 10 oz (109.6 kg)   SpO2 95%   BMI 35.68 kg/m²       Labs:   Recent Labs     12/08/22  1125 12/08/22  0742 12/08/22  0301 12/06/22  0812 12/06/22  0326   WBC  --   --  8.3   < > 8.7   HGB  --   --  10.0*   < > 10.0*   HCT  --   --  32.1*   < > 32.5*   PLT  --   --  236   < > 239   NA  --   --  134*   < > 135*   K  --   --  4.6   < > 4.5   BUN  --   --  17   < > 19   CREA  --   --  1.06   < > 1.00   GLU  --   --  222*   < > 229*   GLUCPOC 190*   < >  --    < >  --    INR  --   --   --   --  1.1    < > = values in this interval not displayed. Diagnostics:   CXR Results  (Last 48 hours)                 12/08/22 0556  XR CHEST PORT Final result    Impression:  1. The lungs are clear       Narrative:  EXAM:  XR CHEST PORT       INDICATION: Postop heart surgery       COMPARISON: 12/7/2022       TECHNIQUE: Upright portable chest AP view and 0519       FINDINGS: The patient is status post median sternotomy. No change mild   cardiomegaly. The lungs are clear no pneumothorax. No pulmonary edema. 12/07/22 0618  XR CHEST PORT Final result    Impression:  1. Minimal bibasilar areas of atelectasis are noted. This has improved in the   interval.       Narrative:  EXAM:  XR CHEST PORT       INDICATION: Postop heart surgery       COMPARISON: 12/6/2022       TECHNIQUE: Upright portable chest AP view at 0542       FINDINGS: The patient is status post median sternotomy. Mild cardiomegaly is   unchanged. Lungs demonstrate improved aeration. Minimal linear atelectasis is   seen at the left lung bases has decreased. Minimal linear atelectasis is seen at   the right lung base. No pneumonia. No pulmonary edema. No pneumothorax.                     Patient Instructions/Follow Up Care:  Discharge instructions were reviewed with the patient and family present. Questions were also answered at this time. Prescriptions and medications were reviewed. The patient has a follow up appointment with the Nurse Practitioner or [address] [place] [address] Assistant on 12/16/22 and with Dr. Ana M Godfrey on 12/22/22. The patient was also instructed to follow up with his primary care physician as needed. The patient and family were encouraged to call with any questions or concerns.        Signed:  Sam Davis NP  12/8/2022  1:38 PM

## 2022-12-08 NOTE — PROGRESS NOTES
[facility] Floor Progress Note    Admit Date: 11/15/2022  POD:  16 Day Post-Op    Procedure:  Procedure(s):  SYLWIA BY DR Kane Meyer -  CORONARY ARTERY BYPASS GRAFT  X 1 WITH LEFT INTERNAL MAMMARY ARTERY GRAFTING - AORTIC VALVE REPLACEMENT WITH MEYER 25MM INSPIRIS RESILIA TISSUE VALVE AND REPAIR OF ASCENDING AORTIC ANEURYSM        Subjective/Interval:   Pt seen with Dr. Jas Martin. Reports he isnt sleeping well in the bed. He has been up since 3 am, got back to bed a little bit ago. States he has been working hard with his mobility. He would prefer to go home if he's found safe to do so. On RA. Afebrile. Objective:   Vitals:  Blood pressure (!) 115/50, pulse 84, temperature 98.2 °F (36.8 °C), resp. rate 18, height 5' 9\" (1.753 m), weight 241 lb 10 oz (109.6 kg), SpO2 98 %. Temp (24hrs), Av.2 °F (36.8 °C), Min:97.9 °F (36.6 °C), Max:98.4 °F (36.9 °C)    EKG/Rhythm:  NSR 80-90s    Oxygen Therapy:  RA    CXR  CXR Results  (Last 48 hours)                 22 0556  XR CHEST PORT Final result    Impression:  1. The lungs are clear       Narrative:  EXAM:  XR CHEST PORT       INDICATION: Postop heart surgery       COMPARISON: 2022       TECHNIQUE: Upright portable chest AP view and 0519       FINDINGS: The patient is status post median sternotomy. No change mild   cardiomegaly. The lungs are clear no pneumothorax. No pulmonary edema. 22 0618  XR CHEST PORT Final result    Impression:  1. Minimal bibasilar areas of atelectasis are noted. This has improved in the   interval.       Narrative:  EXAM:  XR CHEST PORT       INDICATION: Postop heart surgery       COMPARISON: 2022       TECHNIQUE: Upright portable chest AP view at 0542       FINDINGS: The patient is status post median sternotomy. Mild cardiomegaly is   unchanged. Lungs demonstrate improved aeration. Minimal linear atelectasis is   seen at the left lung bases has decreased. Minimal linear atelectasis is seen at   the right lung base. No pneumonia. No pulmonary edema. No pneumothorax. Admission Weight: Last Weight   Weight: 257 lb 15 oz (117 kg) Weight: 241 lb 10 oz (109.6 kg)     Intake / Output / Drain:  Current Shift: 12/08 0701 - 12/08 1900  In: -   Out: 600 [Urine:600]  Last 24 hrs.:   Intake/Output Summary (Last 24 hours) at 12/8/2022 0921  Last data filed at 12/8/2022 0738  Gross per 24 hour   Intake 720 ml   Output 4450 ml   Net -3730 ml     EXAM:  General:  Alert, appropriate, NAD. Lungs:    Diminished to auscultation bilaterally   Incision:  Incision clean, dry and intact and prineo intact   Heart:   Regular rate and rhythm  and no murmur, rubs or gallops   Abdomen:    Distended, soft, hypoactive bowel sounds, and obese. Extremities:  Trace edema BLE, BUE   Neurologic:  Moving all extremities purposefully. A&O x4. Sensory intact     Labs:   Recent Labs     12/08/22  0742 12/08/22  0301 12/06/22  0812 12/06/22  0326   WBC  --  8.3   < > 8.7   HGB  --  10.0*   < > 10.0*   HCT  --  32.1*   < > 32.5*   PLT  --  236   < > 239   NA  --  134*   < > 135*   K  --  4.6   < > 4.5   BUN  --  17   < > 19   CREA  --  1.06   < > 1.00   GLU  --  222*   < > 229*   GLUCPOC 233*  --    < >  --    INR  --   --   --  1.1    < > = values in this interval not displayed. Assessment:     Active Problems:    Acute non-Q wave non-ST elevation myocardial infarction (NSTEMI) ([address] (11/15/2022)      Aortic stenosis (11/22/2022)      CAD (coronary artery disease) (11/22/2022)      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)      Overview: On pump CORONARY ARTERY BYPASS GRAFT  X 1 WITH LEFT INTERNAL MAMMARY       ARTERY to LAD      AORTIC VALVE REPLACEMENT WITH MEYER 25MM INSPIRIS RESILIA TISSUE VALVE       REPAIR OF ASCENDING AORTIC ANEURYSM with 26mm hemashield graft      S/P AVR (aortic valve replacement) and aortoplasty (11/22/2022)      Overview:  On pump CORONARY ARTERY BYPASS GRAFT  X 1 WITH LEFT INTERNAL MAMMARY       ARTERY to LAD      AORTIC VALVE REPLACEMENT WITH MEYER 25MM INSPIRIS RESILIA TISSUE VALVE       REPAIR OF ASCENDING AORTIC ANEURYSM with 26mm hemashield graft       Plan/Recommendations/Medical Decision Making:     Severe symptomatic AS, s/p tissue AVR. Continue ASA. CAD, STEMI, s/p CABG. Continu e ASA. Holding amio for transaminitis. Repeat Echo without effusion or tamponade, NL LV/RVF. Will start statin today and monitor labs. Acute hypoxic respiratory failure: Extubated, cont to wean O2 as able. Cont diuresis, hourly IS. Nebs BID and PRN. Flutter valve. On RA  SERGE: Crt up to 2.01. Nephrology consult appreciated, avoid nephrotoxins, trend. Crt 1.06 this am. Cont Lasix 40 mg daily PO  Acute blood loss anemia: improving, H&H up to 10/32.1 this am  Transaminitis. Acute Severe Hepatitis, GI consult appreciated, + hepatomegaly and splenomegaly. Viral panel neg. Triple phase liver scan completed 11/30. No hepatic of splenic ischemia noted. + hepatic steatosis. Labs cont to trend down. Start statin today  Leukocytosis: BC, Sputum and urine cultures NGTD. ID following, Completed Meropenem. Rapid COVID pos, 2 PCR negative. WBC up to 22.6, 8.3 today, Afebrile. Last Procal 0.09   HTN: On ACEi, BB PTA; Cont Metop 25mg BID  HLD: holding statin. WALLY , not on CPAP. Was unable to tolerate due to anxiety; he has been working on this with Sleep Medicine. OP follow-up. refusing CPAP  DMII. On Toujeo at home. Repeat A1C 9.0. Diabetes management following. Cont lantus and mealtime coverage  GERD. Continue PPI. Anxiety and depression. On Buspar, Zoloft; continue. Supportive care. Ileus: + BMs. Reduce bowel regimen. Nutrition: Advance diet as tolerated, plan to be seen by speech again today. Deconditioning/mobility: OOB TID, ambulating BID. Arm exercises. PT stating IPR. Pt wishes to go home with [facility] PT, will cont to work to see if he is able. TRISTIN pending insurance auth     DVT ppx- Lovenox  Dispo- PT/OT. Stepdown. OOB TID, Ambulating BID.  Case management following to aid in d/c planning, d/c TBD      Signed By: Tiffany Machado NP

## 2022-12-08 NOTE — PROGRESS NOTES
Cardiac Surgery Care Coordinator- Met with Bean Hazel and his family. Reviewed plan of care and discussed potential discharge date. Reinforced sternal precautions and encouraged continued use of the incentive spirometer. Began discharge teaching and encouraged them to verbalize. Reviewed goals for the day and discussed the  importance of increased activity and continued activity after discharge. Will continue to follow for educational and emotional needs.  Janice Shetty RN

## 2022-12-09 ENCOUNTER — APPOINTMENT (OUTPATIENT)
Dept: GENERAL RADIOLOGY | Age: 42
End: 2022-12-09
Attending: NURSE PRACTITIONER
Payer: COMMERCIAL

## 2022-12-09 VITALS
DIASTOLIC BLOOD PRESSURE: 55 MMHG | BODY MASS INDEX: 35.36 KG/M2 | HEIGHT: 69 IN | OXYGEN SATURATION: 97 % | TEMPERATURE: 98.1 F | SYSTOLIC BLOOD PRESSURE: 100 MMHG | HEART RATE: 87 BPM | RESPIRATION RATE: 16 BRPM | WEIGHT: 238.76 LBS

## 2022-12-09 PROBLEM — I35.0 AORTIC STENOSIS: Status: RESOLVED | Noted: 2022-11-22 | Resolved: 2022-12-09

## 2022-12-09 LAB
ALBUMIN SERPL-MCNC: 3 G/DL (ref 3.5–5)
ALBUMIN/GLOB SERPL: 0.9 {RATIO} (ref 1.1–2.2)
ALP SERPL-CCNC: 160 U/L (ref 45–117)
ALT SERPL-CCNC: 93 U/L (ref 12–78)
ANION GAP SERPL CALC-SCNC: 5 MMOL/L (ref 5–15)
AST SERPL-CCNC: 33 U/L (ref 15–37)
BASOPHILS # BLD: 0.1 K/UL (ref 0–0.1)
BASOPHILS NFR BLD: 1 % (ref 0–1)
BILIRUB SERPL-MCNC: 0.5 MG/DL (ref 0.2–1)
BUN SERPL-MCNC: 22 MG/DL (ref 6–20)
BUN/CREAT SERPL: 20 (ref 12–20)
CALCIUM SERPL-MCNC: 8.9 MG/DL (ref 8.5–10.1)
CHLORIDE SERPL-SCNC: 99 MMOL/L (ref 97–108)
CO2 SERPL-SCNC: 28 MMOL/L (ref 21–32)
CREAT SERPL-MCNC: 1.12 MG/DL (ref 0.7–1.3)
DIFFERENTIAL METHOD BLD: ABNORMAL
EOSINOPHIL # BLD: 0.6 K/UL (ref 0–0.4)
EOSINOPHIL NFR BLD: 7 % (ref 0–7)
ERYTHROCYTE [DISTWIDTH] IN BLOOD BY AUTOMATED COUNT: 15.5 % (ref 11.5–14.5)
GLOBULIN SER CALC-MCNC: 3.5 G/DL (ref 2–4)
GLUCOSE BLD STRIP.AUTO-MCNC: 257 MG/DL (ref 65–117)
GLUCOSE BLD STRIP.AUTO-MCNC: 326 MG/DL (ref 65–117)
GLUCOSE SERPL-MCNC: 365 MG/DL (ref 65–100)
HCT VFR BLD AUTO: 31 % (ref 36.6–50.3)
HGB BLD-MCNC: 9.5 G/DL (ref 12.1–17)
IMM GRANULOCYTES # BLD AUTO: 0 K/UL (ref 0–0.04)
IMM GRANULOCYTES NFR BLD AUTO: 0 % (ref 0–0.5)
LYMPHOCYTES # BLD: 1.9 K/UL (ref 0.8–3.5)
LYMPHOCYTES NFR BLD: 22 % (ref 12–49)
MAGNESIUM SERPL-MCNC: 2.2 MG/DL (ref 1.6–2.4)
MCH RBC QN AUTO: 26.2 PG (ref 26–34)
MCHC RBC AUTO-ENTMCNC: 30.6 G/DL (ref 30–36.5)
MCV RBC AUTO: 85.6 FL (ref 80–99)
METAMYELOCYTES NFR BLD MANUAL: 4 %
MONOCYTES # BLD: 0.3 K/UL (ref 0–1)
MONOCYTES NFR BLD: 4 % (ref 5–13)
NEUTS SEG # BLD: 5.3 K/UL (ref 1.8–8)
NEUTS SEG NFR BLD: 62 % (ref 32–75)
NRBC # BLD: 0 K/UL (ref 0–0.01)
NRBC BLD-RTO: 0 PER 100 WBC
PLATELET # BLD AUTO: 257 K/UL (ref 150–400)
PMV BLD AUTO: 9.5 FL (ref 8.9–12.9)
POTASSIUM SERPL-SCNC: 5.1 MMOL/L (ref 3.5–5.1)
PROT SERPL-MCNC: 6.5 G/DL (ref 6.4–8.2)
RBC # BLD AUTO: 3.62 M/UL (ref 4.1–5.7)
RBC MORPH BLD: ABNORMAL
SERVICE CMNT-IMP: ABNORMAL
SERVICE CMNT-IMP: ABNORMAL
SODIUM SERPL-SCNC: 132 MMOL/L (ref 136–145)
WBC # BLD AUTO: 8.5 K/UL (ref 4.1–11.1)

## 2022-12-09 PROCEDURE — 99232 SBSQ HOSP IP/OBS MODERATE 35: CPT | Performed by: CLINICAL NURSE SPECIALIST

## 2022-12-09 PROCEDURE — 71045 X-RAY EXAM CHEST 1 VIEW: CPT

## 2022-12-09 PROCEDURE — 74011250637 HC RX REV CODE- 250/637: Performed by: NURSE PRACTITIONER

## 2022-12-09 PROCEDURE — 85025 COMPLETE CBC W/AUTO DIFF WBC: CPT

## 2022-12-09 PROCEDURE — 74011250636 HC RX REV CODE- 250/636: Performed by: NURSE PRACTITIONER

## 2022-12-09 PROCEDURE — 83735 ASSAY OF MAGNESIUM: CPT

## 2022-12-09 PROCEDURE — 36415 COLL VENOUS BLD VENIPUNCTURE: CPT

## 2022-12-09 PROCEDURE — 74011636637 HC RX REV CODE- 636/637: Performed by: NURSE PRACTITIONER

## 2022-12-09 PROCEDURE — 82962 GLUCOSE BLOOD TEST: CPT

## 2022-12-09 PROCEDURE — 74011250637 HC RX REV CODE- 250/637: Performed by: THORACIC SURGERY (CARDIOTHORACIC VASCULAR SURGERY)

## 2022-12-09 PROCEDURE — 97535 SELF CARE MNGMENT TRAINING: CPT

## 2022-12-09 PROCEDURE — 74011000250 HC RX REV CODE- 250: Performed by: NURSE PRACTITIONER

## 2022-12-09 PROCEDURE — 80053 COMPREHEN METABOLIC PANEL: CPT

## 2022-12-09 RX ORDER — LEVOTHYROXINE SODIUM 125 UG/1
150 TABLET ORAL
Qty: 90 TABLET | Refills: 3 | Status: SHIPPED | OUTPATIENT
Start: 2022-12-09

## 2022-12-09 RX ORDER — POTASSIUM CHLORIDE 1500 MG/1
40 TABLET, FILM COATED, EXTENDED RELEASE ORAL DAILY
Qty: 14 TABLET | Refills: 0 | Status: SHIPPED | OUTPATIENT
Start: 2022-12-10 | End: 2022-12-17

## 2022-12-09 RX ORDER — FUROSEMIDE 40 MG/1
TABLET ORAL
Qty: 7 TABLET | Refills: 0 | Status: SHIPPED | OUTPATIENT
Start: 2022-12-10

## 2022-12-09 RX ORDER — OXYCODONE HYDROCHLORIDE 5 MG/1
5-10 TABLET ORAL
Qty: 10 TABLET | Refills: 0 | Status: SHIPPED | OUTPATIENT
Start: 2022-12-09 | End: 2022-12-19

## 2022-12-09 RX ADMIN — Medication 15 UNITS: at 08:31

## 2022-12-09 RX ADMIN — ASPIRIN 81 MG CHEWABLE TABLET 81 MG: 81 TABLET CHEWABLE at 08:35

## 2022-12-09 RX ADMIN — OXYCODONE 5 MG: 5 TABLET ORAL at 12:38

## 2022-12-09 RX ADMIN — ENOXAPARIN SODIUM 30 MG: 100 INJECTION SUBCUTANEOUS at 08:35

## 2022-12-09 RX ADMIN — FUROSEMIDE 40 MG: 40 TABLET ORAL at 08:35

## 2022-12-09 RX ADMIN — SERTRALINE 100 MG: 50 TABLET, FILM COATED ORAL at 08:35

## 2022-12-09 RX ADMIN — INSULIN GLARGINE 60 UNITS: 100 INJECTION, SOLUTION SUBCUTANEOUS at 12:36

## 2022-12-09 RX ADMIN — BUSPIRONE HYDROCHLORIDE 15 MG: 5 TABLET ORAL at 08:35

## 2022-12-09 RX ADMIN — LEVOTHYROXINE SODIUM 150 MCG: 0.07 TABLET ORAL at 05:58

## 2022-12-09 RX ADMIN — INSULIN GLARGINE 60 UNITS: 100 INJECTION, SOLUTION SUBCUTANEOUS at 00:10

## 2022-12-09 RX ADMIN — METOPROLOL TARTRATE 25 MG: 25 TABLET, FILM COATED ORAL at 08:35

## 2022-12-09 RX ADMIN — CASTOR OIL AND BALSAM, PERU: 788; 87 OINTMENT TOPICAL at 08:36

## 2022-12-09 RX ADMIN — PANTOPRAZOLE SODIUM 40 MG: 40 TABLET, DELAYED RELEASE ORAL at 08:35

## 2022-12-09 RX ADMIN — Medication 15 UNITS: at 12:35

## 2022-12-09 RX ADMIN — OXYCODONE 5 MG: 5 TABLET ORAL at 06:12

## 2022-12-09 RX ADMIN — SODIUM CHLORIDE, PRESERVATIVE FREE 10 ML: 5 INJECTION INTRAVENOUS at 05:59

## 2022-12-09 NOTE — PROGRESS NOTES
No further CM needs Identified at this time. Transition of Care Plan:     RUR:  14% \"low risk\"  Disposition:  Home with [facility] [place]  If SNF or IPR: Date FOC offered:  N/A  Date FOC received:  N/A  Date authorization started with reference number: N/A  Date authorization received and expires: N/A  Accepting facility: N/A  Follow up appointments:  PCP & Specialists as needed  DME needed: Rolling walker  Transportation at Discharge: Pt's mother to provide transport  [address] or means to access home:  Pt has access to home   IM Medicare Letter: N/A  Is patient a [employer] and connected with the [place]? No  Caregiver Contact: Pt's mother Lauren Daily [phone]  Discharge Caregiver contacted prior to discharge? Care Conference needed?: Not at this time    Update 1242 pm: Pt accepted at [facility]. No CM needs pending at this time. Information available on AVS. CM will remain accessible for consult incase additional CM needs arise. Initial note: Chart reviewed for updates. D/C acknowledged by CM. CM met with pt and his mother Lauren Daily [phone] at bedside, introduced role and discussed overview of the d/c planning. Pt and mother agreeable with d/c plan. CM spoke with pt about PT/OT recommendations for Home health and a rolling walker. Pt agreeable with PT/OT disposition. Pt's mother to provide transport at d/c. CM has sent referrals for [facility] and rolling walker. Rolling walker approved by [facility] and [employer] has pulled one from DME closet and given to pt. Home Health pending approval. CM will continue to follow up. Care Management Interventions  PCP Verified by CM: Yes  Palliative Care Criteria Met (RRAT>21 & CHF Dx)?: No  Mode of Transport at Discharge:  Other (see comment) (Mother at bedside will provide transport)  Transition of Care Consult (CM Consult): Discharge Planning  Discharge Durable Medical Equipment: Yes  Health Maintenance Reviewed:  (Rolling walker)  Physical Therapy Consult: Yes  Occupational Therapy Consult: Yes  Speech Therapy Consult: Yes  Support Systems: Parent(s) (Pt's mother is his main support system)  Confirm Follow Up Transport: Family (Mother at bedside will provide transport)  The Plan for Transition of Care is Related to the Following Treatment Goals : Home with [address] Provided?: No  Discharge Location  Patient Expects to be Discharged to[de-identified] Home with home health (Pt is d//c home with Located within [facility] and follow up apts)     JOSE MIGUEL Campbell    [phone]

## 2022-12-09 NOTE — DISCHARGE INSTRUCTIONS
[facility]     [address]  [address]                                                           [address], [address]  Office- [phone]  Fax- [phone]        Office- [phone]  Fax- [phone]  _________________________________________________________________  Dr. Luis A Thomas, NP            Dione Coker Dr., NP                   Nain Michaels, NP     Dr. Cristina De Leon, NP            Annika Weiss, LOLLY Bagley, LATASHA Strong NP                                                                                                                                            Name:Sabas Cervantes     Surgery & Date: Procedure(s):  SYLWIA BY DR Nicolas Cassidy -  CORONARY ARTERY BYPASS GRAFT  X 1 WITH LEFT INTERNAL MAMMARY ARTERY GRAFTING - AORTIC VALVE REPLACEMENT WITH MEYER 25MM INSPIRIS RESILIA TISSUE VALVE AND REPAIR OF ASCENDING AORTIC ANEURYSM    Discharge Date: 12/9/22     MEDICATIONS:  Please refer to your After Visit Summary for your medication list. If you do not have a prescription for a new medication, you may purchase the medication over the counter. DO NOT TAKE ANY MEDICATIONS THAT ARE NOT ON THIS LIST      INSTRUCTIONS:  NO SMOKING OR TOBACCO PRODUCTS  Follow all the instructions in your discharge book  Shower daily. Wash all incisions twice daily with Dial soap and water. After each wash with soap and water, wash incisions with Dynahex 4 solution and rinse. Do this for 14 days. No lotions, ointments or powder. Call the office immediately for any redness, swelling, or drainage from your incision.    Take your temperature daily and call for a temperature of 101 degrees or higher or for any symptoms that make you think you have and infection. Weigh yourself each morning. Call if you gain more than 5 pounds in 48 hours. Use the incentive spirometer 6-8 times a day-10 breaths each time. Use a pillow or your bear to splint your breastbone when coughing or sneezing. If you feel your breast bone clicking or popping, notify the office immediately. Walk several hundred feet several times daily. DIET  Eat an American Heart Association diet. If you are having trouble with your appetite, eat what you can. Try eating small, frequent meals throughout the day. Diabetic patients should follow an ADA diet. Check your blood sugars  And keep a log of your results. ACTIVITY  NO DRIVING--you will be evaluated to drive at your follow up visit. Increase your activity by walking several times a day. Stay out of bed most of the day. When sitting, keep your legs elevated. You may ride in a car, but you must get out every hour and walk around. If you ride in a car with an airbag that can not be switched off, put the seat ALL the way back or ride in the back seat. Any load bearing activity can be performed as long as it can be performed \"in the tube\". You can reach \"out of the tube\" when performing activities that don't require heavy lifting. Let pain be your guide, your pain level will keep you from doing anything extreme. Normal Problems After Discharge:  Some fatigue and difficulty sleeping  Poor appetite initially, with temporary change in taste  Temperature variability; feeling hot and cold  Chest wall soreness and paresthesia (numbness)  Some musculoskeletal pain along joint lines in chest and back.    Tylenol or NSAIDs will help unless contraindicated    Patients with EVH (Endoscopic Vein [place]) will have mild swelling in that leg due to temporary venous insufficiency  Elevation & compression stockings will help to reduce the swelling        FOLLOW UP  Your first follow up appointment will be on 12/16/22 at 10:30am . Our office is located in [address] [place] [address], [address]. Your second follow up appointment will be  on 12/22/22 at 1:45pm. Please call our office at [phone] if you are unable to make either one of these appointments. Your intake appointment with Cardiac Rehab will be on 12/29/22 at 2:00pm. They are located in the [address] [place] [address], [address]. Their phone number is [phone]. Dr. Loretta Shah office will call to schedule your follow up appointment. Consult you primary care physician regarding your influenza & pneumovax vaccines. Please bring all medications (or a complete list) with you to your appointment. Do not hesitate to contact our office 24/7 with any questions or concerns.  Call the number on your red bracelet ([phone])      Signature:___________________________________________________

## 2022-12-09 NOTE — PROGRESS NOTES
Problem: Falls - Risk of  Goal: *Absence of Falls  Description: Document Sherlyshantell Counts Fall Risk and appropriate interventions in the flowsheet. Outcome: Progressing Towards Goal  Note: Fall Risk Interventions:  Mobility Interventions: Bed/chair exit alarm, Communicate number of staff needed for ambulation/transfer, OT consult for ADLs, Patient to call before getting OOB, PT Consult for mobility concerns, PT Consult for assist device competence, Utilize walker, cane, or other assistive device    Mentation Interventions: Adequate sleep, hydration, pain control, Bed/chair exit alarm, Update white board    Medication Interventions: Bed/chair exit alarm, Patient to call before getting OOB, Evaluate medications/consider consulting pharmacy, Teach patient to arise slowly    Elimination Interventions: Bed/chair exit alarm, Call light in reach, Patient to call for help with toileting needs, Toileting schedule/hourly rounds, Urinal in reach    History of Falls Interventions: Bed/chair exit alarm, Investigate reason for fall, Room close to nurse's station         Problem: Cardiac Valve Surgery: Discharge Outcomes  Goal: *Stable cardiac rhythm  Outcome: Progressing Towards Goal  Goal: *Demonstrates ability to perform prescribed activity without shortness of breath or discomfort  Outcome: Progressing Towards Goal  Goal: *Optimal pain control at patient's stated goal  Outcome: Progressing Towards Goal  Goal: *Anxiety reduced or absent  Outcome: Progressing Towards Goal  Goal: *Verbalizes and demonstrates incision care  Outcome: Progressing Towards Goal     Problem: Pressure Injury - Risk of  Goal: *Prevention of pressure injury  Description: Document Sree Scale and appropriate interventions in the flowsheet.   Outcome: Progressing Towards Goal  Note: Pressure Injury Interventions:  Sensory Interventions: Assess changes in LOC, Assess need for specialty bed, Float heels, Keep linens dry and wrinkle-free, Maintain/enhance activity level, Minimize linen layers    Moisture Interventions: Absorbent underpads, Minimize layers    Activity Interventions: Increase time out of bed, Chair cushion, Pressure redistribution bed/mattress(bed type), PT/OT evaluation    Mobility Interventions: Float heels, HOB 30 degrees or less, Pressure redistribution bed/mattress (bed type), PT/OT evaluation    Nutrition Interventions: Document food/fluid/supplement intake, Discuss nutritional consult with provider, Offer support with meals,snacks and hydration    Friction and Shear Interventions: Apply protective barrier, creams and emollients, Minimize layers                Problem: CABG: Discharge Outcomes  Goal: *Stable cardiac rhythm  Outcome: Progressing Towards Goal  Goal: *Optimal pain control at patient's stated goal  Outcome: Progressing Towards Goal  Goal: *Anxiety reduced or absent  Outcome: Progressing Towards Goal

## 2022-12-09 NOTE — PROGRESS NOTES
Cardiac Surgery Care Coordinator-  Met with Frida Beard and his mother, reviewed plan of care and discharge instructions. Reinforced move in the tube, sternal precautions and continued use of the incentive spirometer. Frida Beard is able to pull 1500cc. Reviewed the importance of daily temp and weight monitoring, discussed incisional care and reviewed signs and symptoms of infection. Red reminder bracelet on left wrist, reviewed purpose of the bracelet and when to call the MD.  Reminded pt of appts and encouraged participation in the Cardiac Wellness and rehab program after discharge. Encouraged Frida Beard to verbalize and emotional support given. Frida Beard is without questions or concerns at this time. Will follow up with a phone call after discharge.  Joel Zhong RN

## 2022-12-09 NOTE — DIABETES MGMT
[facility]  [facility]    CLINICAL NURSE SPECIALIST  DISCHARGE RECOMMENDATIONS    Initial Presentation   Tyson Garcia is a 36 y.o. male admitted 11/15/22 after experiencing chest pain. Afebrile. Hypertensive. 02 sats 100%  LAB: WBC 8.7. Normal H&H. /AG 3. Normal kidney & liver parameters. NT pro-; troponin 714  CXR:  Mild interstitial pulmonary edema versus interstitial pneumonia. No   pneumothorax. Consider PA and lateral chest views when the patient can better   tolerate. HX:   Past Medical History:   Diagnosis Date    Anxiety and depression     anxiety, depression    Bleeding of eye, left     8/17/21 pt reports receiving injections in right eye for beeding    Chronic headaches 2007    COVID-19 12/20/2020    Diabetes type 1, uncontrolled     since 8years old    GERD (gastroesophageal reflux disease)     Hypercholesterolemia     Hypertension     Hypothyroidism     MI (myocardial infarction) ([facility] [address]     as of 8/17/21 pt reports 9 stents total    WALLY on CPAP       INITIAL DX:   Acute non-Q wave non-ST elevation myocardial infarction (NSTEMI) ([facility] [address] [I21.4]  Aortic stenosis [I35.0]  CAD (coronary artery disease) [I25.10]     Current Treatment     TX: 11/22/22 On pump CORONARY ARTERY BYPASS GRAFT  X 1 WITH LEFT INTERNAL MAMMARY ARTERY to LAD  AORTIC VALVE REPLACEMENT WITH MEYER 25MM INSPIRIS RESILIA TISSUE VALVE   REPAIR OF ASCENDING AORTIC ANEURYSM with 26mm hemashield graft    Consulted by Provider for advanced diabetes nursing assessment and care for:   [x] Transitioning off [address]   [x] Inpatient management strategy  [] Home management assessment  [] Survival skill education    Hospital Course   Clinical progress has been complicated by need for ICU level of care. 11/24/22 Overnight developed hypoxemia. Underwent bronch and SYLWIA, neither of which explained cause for hypoxemia. He required re-intubation and was put on veletri.   11/25/22 Intermittent desaturation events. CXR this a.m. with pulmonary edema  11/27/22 Concern for hepatic ischemia/infarction with progressive trnasaminitis. GI consulted determines severe hepatitis  11/29/22 Sedated on Precedex & Propofol. On C vent support Fi02 50%/Peep 8. Trying to transition off epi to baldev infusions. Will restart Tfs. NG 2 suction at the moment. Plan on diuresing  11/30/22 Follows commands with cough & gag+. On AC vent support Fi02 50%/Peep 8. On Fentanyl infusion; Propofol being weaned. BP managed with epi & baldev infusions. NG to suction & drawing 500cc over past day. KKUB revealed prominent fecal status. Receiving Miralax & Senna; no BM for 14 days. 12/1/22 Anxious. BP & HR up. Plan to extubate this morning. On Spon vent support Fi02 40%/Peep 5. Lots of oral secretions. Remains on baldev & epi infusions fo BP management. NG clamped. Will receive enema today for fecal stasis. 12/2/22 Alert & oriented. Precedex no longer needed for anxiety; will be stopped. O2 via MF. NG remains in place. Plans for Speech evaluation to move to oral feeding. In the meantime, a Dobhoff may be indicated with initial feedings at trickle rate. PT will be started as well. 12/5/22 Alert & oriented. Afebrile. On MF 02; no CPAP overnight. CXR:   Stable cardiac silhouette, prior sternotomy. No acute infiltrate or   shift. Removal of central line. Eating first true meal this morning; ate everything. OOB with assistance & less dizziness per nursing. Moving bowels per patient. 12/6/22 Alert & oriented. Afebrile. On room air. Eating well. OOB to chair.    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 Max daily dose of 150 units. Diabetes self-management practices:   Eating pattern - Eats 3 meals on most days and snacks in the evening   [x] Not eating a carbohydrate-controlled mealplan  Physical activity pattern   [x] Not employing a physical activity program to control BG  Monitoring pattern   [x] Testing BGs   [x] Breakfast 300s  [x] Lunch  100-200s  [x] Dinner  100-200s  Taking medications pattern  [x] Consistent administration  [x] Affordable  Overall evaluation:    [x] Not achieving A1c target with drug therapy & self-care practices    Subjective   \"Yes, I'm going home. \"     Objective   Physical exam  General Obese male in no acute distress  Neuro  Alert & oriented  Vital Signs Visit Vitals  BP (!) 100/55   Pulse 87   Temp 98.1 °F (36.7 °C)   Resp 16   Ht 5' 9\" (1.753 m)   Wt 108.3 kg (238 lb 12.1 oz)   SpO2 97%   BMI 35.26 kg/m²     Laboratory  Recent Labs     12/09/22  0334 12/08/22  0301 12/07/22  0354   * 222* 312*   AGAP 5 6 4*   WBC 8.5 8.3 8.7   CREA 1.12 1.06 1.07   AST 33 52* 49*   ALT 93* 110* 124*     Factors impacting BG management  Factor Dose Comments   Nutrition:  Easy to Chew   60 gram CHO meals   Eating well   Drugs:  Atypical antipsychotics   Buspar 3XB. Zoloft D      Pain Oxycodone PRN    Infection  Afebrile.  WBC normalized   Other:   Kidney function  Liver function   SERGE resolved  ALT elevated but continues to trend down     Acute Severe Hepatitis per GI     Blood glucose pattern      Significant diabetes-related events over the past 24-72 hours  11/15/22 Admission   Total insulin requirements without achieving target Bgs until 11/20/22:    11/21/22 Started on Devoria [place]  11/23/22 Using 5-10 units/hr of insulin this morning  11/28/22 Using 2-6 units/hr of insulin  11/29/22 Continues to use 3-6 units/hr of insulin  11/30/22 Using 3.5 units/hr => transitioned off GS  12/1/22 Bgs rising into 200-300s  12/2/22 Change in basal insulin dosing has brought Bgs to goal  12/4/22 Hypoglycemia in the AM  12/6/22 . Eating much better over past 24 hours  12/9/22 After hypoglycemia, missed insulin doses => Bgs in 200-300s    Assessment and Plan   Nursing Diagnosis Risk for unstable blood glucose pattern   Nursing Intervention Domain 5250 Decision-making Support   Nursing Interventions Examined current inpatient diabetes/blood glucose control   Explored factors facilitating and impeding inpatient management  Explored corrective strategies with patient and responsible inpatient provider   Informed patient of rational for insulin strategy while hospitalized     Evaluation   This 36year old  male with known CAD was admitted with chest pain; he underwent CABG & AVR. Patient has known Type 1 diabetes with poor control going into this surgery. Patient required 260 units/D prior to the surgery (during this admission) without consistently achieving BG targets. Had been on Devoria [place] since surgery 11/22/22; also required vent & pressor support. Abdominal distention related to feces finally resolved with bowel regimen. Extubated to MidFlow 02; now on room air. Not eating well over the weekend but now eating consistently. It is clear his basal insulin needs are less than prior to admission; basal dose reduced. Eating well.      Discharge Recommendations     [x] Lantus insulin to 60 units twice daily OR Toujeo insulin 120 units daily    [x] Humalog insulin 20 units at meals OR Regular insulin 20 units with each meal    [x] Set up appt with Latia Valentine MD to re-evaluate diabetes strategy    [x] Would benefit from CGM    Billing Code(s)   [x] 56708 IP subsequent hospital care - 25 minutes     Before making these care recommendations, I personally reviewed the hospitalization record, including notes, laboratory & diagnostic data and current medications, and examined the patient at the bedside (circumstances permitting) before making care recommendations. More than fifty (50) percent of the time was spent in patient counseling and/or care coordination.   Total minutes: [address] [place], CNS  Diabetes Clinical Nurse Specialist  [facility]  Access via Opposing Views

## 2022-12-09 NOTE — ADT AUTH CERT NOTES
Coronary Artery Bypass Graft (CABG) - Care Day 24 (12/8/2022) by Jennie Petty       Review Entered Review Status   12/9/2022 1055 Completed      Criteria Review      Care Day: 24 Care Date: 12/8/2022 Level of Care: Step Down    Guideline Day 3    Level Of Care    ( ) Intermediate care    12/9/2022 10:55:10 EST by Ryan Solis    Clinical Status    (X) * Dangerous arrhythmia absent    12/9/2022 10:55:10 EST by Jennie Petty      Normal sinus rhythm    (X) * Pain absent or managed    12/9/2022 10:55:10 EST by Cecelia Duran 2/10 post-op incisional pain    Activity    (X) * Minimal ambulatory activity    12/9/2022 10:55:10 EST by Kota Dubose in chair, Ambulated in hallway 30 ft - 50 ft    Routes    (X) * Oral medications    12/9/2022 10:55:10 EST by Jennie Petty      Buspar 15mg PO TID, Lasix 40mg PO qd, Synthroid 150mcg PO q6AM, Melatonin 9mg PO hs, Protonix 40mg PO qd, Klor-Con 40 mEq PO qd, Zoloft 100mg PO qd    (X) * Advance diet    12/9/2022 10:55:10 EST by Judy Glover 112 DIET Regular; 4 carb choices (60 gm/meal)    Interventions    (X) * Chest tube absent    12/9/2022 10:55:10 EST by Jennie Petty      Chest tube not indicated    (X) * Central lines absent    12/9/2022 10:55:10 EST by Jennie Petty      Removed 12/04 Right Central Venous Catheter, Double Lumen    ( ) * Pacing wires absent    12/9/2022 10:55:10 EST by Jennie Petty      2 atrial wires, 1 bipolar ventricular wire    Medications    (X) * Epidural analgesics absent    12/9/2022 10:55:10 EST by Jennie Petty      Epidural analgesics not indicated  Lidocaine 4% 2 patch TD q24h, Roxicodone 5mg PO q4h PRN x2    (X) Prophylactic antibiotics discontinued    12/9/2022 10:55:10 EST by Jennie Petty      Completed Meropenem    (X) Aspirin    12/9/2022 10:55:10 EST by Jennie Petty      Aspirin 81mg PO qd    (X) Beta-blocker    12/9/2022 10:55:10 EST by Jennie Petty      Lopressor 25mg PO q12h    (X) Statin    12/9/2022 10:55:10 EST by Celestina Barreto      Crestor 40mg PO hs    * Milestone   Additional Notes   DATE: 12/08/22      Pertinent Updates:   -Reports he isnt sleeping well in the bed. He has been up since 3 am, got back to bed a little bit ago. States he has been working hard with his mobility. He would prefer to go home if he's found safe to do so. -Pt had one hypoglycemic episode during shift      Vitals:   97.8 °F (36.6 °C) 95 122/53 17 96% RA      Abnl/Pertinent Labs/Radiology/Diagnostic Studies:   RBC: 3.72 (L)   HGB: 10.0 (L)   HCT: 32.1 (L)   RDW: 15.4 (H)   MONOCYTES: 4 (L)   EOSINOPHILS: 8 (H)   ABS. EOSINOPHILS: 0.7 (H)   Sodium: 134 (L)   Glucose: 222 (H)   Albumin: 2.9 (L)   A-G Ratio: 0.8 (L)   ALT: 110 (H)   AST: 52 (H)   Alk. phosphatase: 170 (H)   GLUCOSE,FAST - POC: 233 (H), 190 (H), 62 (L), 69, 122 (H), 81, 73, 231 (H)      CXR:    1. The lungs are clear      Physical Exam:   General: Alert, appropriate, NAD. Lungs: Diminished to auscultation bilaterally   Incision: Incision clean, dry and intact and prineo intact   Heart: Regular rate and rhythm  and no murmur, rubs or gallops   Abdomen: Distended, soft, hypoactive bowel sounds, and obese. Extremities: Trace edema BLE, BUE   Neurologic: Moving all extremities purposefully. A&O x4. Sensory intact      MD Consults/Assessments & Plans:   CARDIOTHORACIC SURGERY:   1. Severe symptomatic AS, s/p tissue AVR. Continue ASA. 2. CAD, STEMI, s/p CABG. Continu e ASA. Holding amio for transaminitis. Repeat Echo without effusion or tamponade, NL LV/RVF. Will start statin today and monitor labs. 3. Acute hypoxic respiratory failure: Extubated, cont to wean O2 as able. Cont diuresis, hourly IS. Nebs BID and PRN. Flutter valve. On RA   4. SERGE: Crt up to 2.01. Avoid nephrotoxins, trend. Crt 1.06 this am. Cont Lasix 40 mg daily PO   5. Acute blood loss anemia: H&H up to 10/32.1 this am   6. Transaminitis.  Acute Severe Hepatitis, + hepatomegaly and splenomegaly. Viral panel neg. Triple phase liver scan completed 11/30. No hepatic of splenic ischemia noted. + hepatic steatosis. Labs cont to trend down. Start statin today   7. Leukocytosis: BC, Sputum and urine cultures NGTD. Completed Meropenem. Rapid COVID pos, 2 PCR negative. WBC up to 22.6, 8.3 today, Afebrile. Last Procal 0.09    8. HTN: On ACEi, BB PTA; Cont Metop 25mg BID   9. HLD: Start statin today   10. WALLY , not on CPAP. Was unable to tolerate due to anxiety; he has been working on this with Sleep Medicine. OP follow-up. refusing CPAP   11. DMII. On Toujeo at home. Repeat A1C 9.0. Cont lantus and mealtime coverage   12. GERD. Continue PPI. 13. Anxiety and depression. On Buspar, Zoloft; continue. Supportive care. 14. Ileus: + BMs. Reduce bowel regimen. 15. Nutrition: Advance diet as tolerated, plan to be seen by speech again today. 16. Deconditioning/mobility: OOB TID, ambulating BID. Arm exercises. PT stating IPR. Pt wishes to go home with [place] PT, will cont to work to see if he is able. [facility] pending insurance auth       DVT ppx- Lovenox   Dispo- PT/OT. Stepdown. OOB TID, Ambulating BID. DIABETES MANAGEMENT TEAM:   Meal time insulin increased to 20 units Humalog per meal today. BG's below goal after lunch. The meal time insulin was backed down to 15 units Humalog with each meal. Recommend discharging the patient on the current dosing. The patient should follow-up with for future diabetes management. A note regarding the patient's BG's and recommendation was routed to Endo. Discharge Recommendations   1. Continue Lantus insulin to 60 units twice daily   2. Humalog insulin 15 units at meals   3.  Monitor BG's   Follow-up with endocrinology       PT Notes:   Current Level of Function Impacting Discharge (mobility/balance): supervision/standby assist   Recommendation for discharge: Physical therapy at least 2 days/week in the home w/ 24hr assist of mom and/or girlfriend      OT Notes:   Current Level of Function Impacting Discharge (ADLs): up to min A   Recommendation for discharge: IPR vs HH with 24/7 supervision/assist      SLP Notes:   -Regular/thin liquid diet      Medications:   Albuterol 2.5mg neb q4h PRN x1, Lovenox 30mg SC q12h, Lantus 60 units SC q12h, Ativan 0.5mg IV q8h PRN x1, SSI SC 4 units x1  3 units x1, Humalog 20 units SC TID            Coronary Artery Bypass Graft (CABG) - Care Day 23 (12/7/2022) by West Hall       Review Entered Review Status   12/8/2022 1633 Completed      Criteria Review      Care Day: 23 Care Date: 12/7/2022 Level of Care: Step Down    Guideline Day 3    Level Of Care    ( ) Intermediate care    12/8/2022 16:33:14 EST by Vidya Duarte    Clinical Status    (X) * Dangerous arrhythmia absent    12/8/2022 16:33:14 EST by West Hall      Telemetry: Sinus Rhythm    (X) * Pain absent or managed    12/8/2022 16:33:14 EST by West Hall      Minimal complaints of pain    Activity    (X) * Minimal ambulatory activity    12/8/2022 16:33:14 EST by West Hall      Up to chair,  Pt walking halls    Routes    (X) * Oral medications    12/8/2022 16:33:14 EST by West Hall      Buspar 15mg PO TID, Lasix 40mg PO qd, Synthroid 150mcg PO q6AM, Protonix 40mg PO qd, Klor-Con 40 mEq PO qd, Zoloft 100mg PO qd    (X) * Advance diet    Interventions    (X) * Chest tube absent    12/8/2022 16:33:14 EST by West Hall      Chest tube not indicated    (X) * Central lines absent    12/8/2022 16:33:14 EST by West Hall      Removed 12/04 Right Central Venous Catheter, Double Lumen    ( ) * Pacing wires absent    12/8/2022 16:33:14 EST by West Hall      2 atrial wires, 1 bipolar ventricular wire    Medications    (X) * Epidural analgesics absent    12/8/2022 16:33:14 EST by West Hall      Epidural analgesics not indicated  Lidocaine 4% 2 patch TD q24h, Roxicodone 5mg PO q4h PRN x3    (X) Prophylactic antibiotics discontinued    (X) Aspirin    12/8/2022 16:33:14 EST by Charleen Reyna      Aspirin 81mg PO qd    (X) Beta-blocker    12/8/2022 16:33:14 EST by Husseintucker Axel      Lopressor 25mg PO q12h    * Milestone   Additional Notes   DATE: 12/07/22      Pertinent Updates:   Minimal complain of chest post-op pain       PT Updates: He reports increased work of breathing with gait and mask has to be removed. Patient requires active assistance for shoulder flexion due to preexisting shoulder problems. He is unable to perform shoulder Abduction. OT Update: Noted some SOB with activity and patient requiring occasional rest breaks for pacing. Vitals:   98.4 °F (36.9 °C) 87 97/52 15 97% Ra      Abnl/Pertinent Labs/Radiology/Diagnostic Studies:   RBC: 3.89 (L)   HGB: 10.6 (L)   HCT: 33.9 (L)   RDW: 15.4 (H)   BASOPHILS: 2 (H)   ABS. BASOPHILS: 0.2 (H)   Sodium: 132 (L)   Chloride: 96 (L)   Anion gap: 4 (L)   Glucose: 312 (H)   Albumin: 3.1 (L)   A-G Ratio: 0.8 (L)   ALT: 124 (H)   AST: 49 (H)   Alk. phosphatase: 190 (H)   GLUCOSE,FAST - POC: 267 (H), 277 (H), 280 (H), 89, 158 (H)      CXR:   1. Minimal bibasilar areas of atelectasis are noted. This has improved in the   interval.      Physical Exam:   General: Alert, appropriate, NAD. Lungs: Diminished to auscultation bilaterally   Incision: Incision clean, dry and intact and prineo intact   Heart: Regular rate and rhythm  and no murmur, rubs or gallops   Abdomen: Distended, soft, hypoactive bowel sounds, and obese. Extremities: Trace edema BLE, BUE   Neurologic: Moving all extremities purposefully. A&O x4. Sensory intact      MD Consults/Assessments & Plans:   CARDIOTHORACIC SURGERY:   1. Severe symptomatic AS, s/p tissue AVR. Continue ASA. 2. CAD, STEMI, s/p CABG. Continue ASA. Holding amio, statin for transaminitis. Repeat Echo without effusion or tamponade, NL LV/RVF.     3. Acute hypoxic respiratory failure:Extubated, cont to wean O2 as able. Cont diuresis, hourly IS. Nebs BID and PRN. Flutter valve. On RA    4. SERGE: Crt up to 2.01. Avoid nephrotoxins, trend. Crt 1.00 this am. Change Lasix 40 mg PO daily   5. Acute blood loss anemia: H&H up to 10/32.5 this am   6. Transaminitis. Acute Severe Hepatitis, + hepatomegaly and splenomegaly. Viral panel neg. Triple phase liver scan completed 11/30. No hepatic of splenic ischemia noted. + hepatic steatosis. Labs cont to trend down. 7. Leukocytosis: BC, Sputum and urine cultures NGTD. ID following, Completed Meropenem. Rapid COVID pos, 2 PCR negative. WBC up to 22.6, 8.7 today, Afebrile. Last Procal 0.09    8. HTN: On ACEi, BB PTA; Cont Metop 25mg BID   9. HLD: holding statin. 10. WALLY , not on CPAP. Was unable to tolerate due to anxiety; he has been working on this with Sleep Medicine. OP follow-up. refusing our CPAP, will have his approved for use   11. DMII. On Toujeo at home. Repeat A1C 9.0. Cont lantus and mealtime coverage   12. GERD. Continue PPI. 13. Anxiety and depression. On Buspar, Zoloft; continue. Supportive care. 14. Ileus: + BMs. Reduce bowel regimen. 15. Nutrition: Advance diet as tolerated, plan to be seen by speech again today. 16. Deconditioning/mobility: OOB TID, ambulating BID. Arm exercises. PT stating IPR over the weekend, will reassess this am. Pt wishes to go home with [facility] PT, will cont to work to see if he is able. Order placed for referral to [facility]       DVT ppx- Lovenox   Dispo- PT/OT. Stepdown. OOB TID, Ambulating BID. DIABETES MANAGEMENT TEAM:   BG's remain elevated today. The patient is using less insulin than his home dosing. The patient reportedly uses 140 units Toujeo daily and 32 units Humalog and sliding scale with each meal. The patient is now eating well. I recommend an increase in meal time insulin.         Recommendations:   -Reduce Lantus insulin to 60 units twice daily   -Add Humalog insulin 20 units at meals       PT Notes:   Current Level of Function Impacting Discharge (mobility/balance): Min A, medical stability, decreased strength/endurance, increased need for assistance, PLOF. Recommendation for discharge: Therapy 3 hours per day 5-7 days per week. OT Notes: The patient is generally compliant with his sternal precautions during dressing ADLs, but her requires cueing for consistent adherence to his sternal precautions during sit to/from stand transfers. At this time he continues to benefit from acute OT and will need inpatient rehab after discharge. Recommendation for discharge: Therapy 3 hours per day 5-7 days per week      CM Notes:   RUR: 14% \"low risk\"   Disposition: IPR- Accepted at [facility]- pending insurance auth-Insurance auth initiated       Medications:   Lovenox 30mg SC q12h, Lantus 60 units SC q12h, Duo-Neb 3 mL neb BID, SSI SC 7 units x1, Humalog 10 units SC TID       Orders:   Apply/Maintain Graded Compression Stockings, bilateral        Coronary Artery Bypass Graft (CABG) - Care Day 22 (12/6/2022) by Mary Beth Petty       Review Entered Review Status   12/8/2022 1614 Completed      Criteria Review      Care Day: 22 Care Date: 12/6/2022 Level of Care: Step Down    Guideline Day 3    Level Of Care    ( ) Intermediate care    12/8/2022 16:14:23 EST by Marcos Joy    Clinical Status    (X) * Dangerous arrhythmia absent    12/8/2022 16:14:23 EST by Mary Beth Petty      Telemetry: NSR - Sinus Tach 90-100s    ( ) * Pain absent or managed    12/8/2022 16:14:23 EST by Mary Beth Petty      Reports 5/10 chest pain    Activity    (X) * Minimal ambulatory activity    12/8/2022 16:14:23 EST by Mary Beth Petty      up to chair w/ 1 assist. ambulating in hallway with PT/OT.     Routes    (X) * Oral medications    12/8/2022 16:14:23 EST by Mary Beth Petty      Buspar 15mg PO TID, Synthroid 150mcg PO q6AM, Protonix 40mg PO qd, Klor-Con 40 mEq PO qd, Zoloft 100mg PO qd    (X) * Advance diet    12/8/2022 16:14:23 EST by Judy Glover 112 DIET Regular; 4 carb choices (60 gm/meal)    Interventions    (X) * Chest tube absent    12/8/2022 16:14:23 EST by Jennie Petty      Chest tube not indicated    (X) * Central lines absent    12/8/2022 16:14:23 EST by Jennie Petty      Removed 12/04 Right Central Venous Catheter, Double Lumen    ( ) * Pacing wires absent    12/8/2022 16:14:23 EST by Jennie Petty      2 atrial wires, 1 bipolar ventricular wire    (X) Cardiac rehabilitation    12/8/2022 16:14:23 EST by Jennie Petty      Educated using teach back method. Reviewed the use of bear for sternal support, daily weight and temperature monitoring, showering restrictions, signs and symptoms of infection at surgery sites, daily walking and arm exercises, and use of IS    Medications    (X) * Epidural analgesics absent    12/8/2022 16:14:23 EST by Jennie Petty      Epidural analgesics not indicated   Lidocaine 4% 2 patch TD q24h, Roxicodone 5mg PO q4h PRN x3    (X) Prophylactic antibiotics discontinued    (X) Aspirin    12/8/2022 16:14:23 EST by Jennie Petty      Aspirin 81mg PO qd    (X) Beta-blocker    12/8/2022 16:14:23 EST by Jennie Petty      Lopressor 25mg PO q12h    * Milestone   Additional Notes   DATE: 12/06/22      Pertinent Updates:   -Fine cracles heard in bilateral lower lobs; continue IV diuretic per order.    -Pt refused to use CPAP and request to use 2L NC for for sleep apnea      Vitals:   98.9 °F (37.2 °C) 103 1129/58 23 92% NC 2 l/min      Abnl/Pertinent Labs/Radiology/Diagnostic Studies:   RBC: 3.72 (L)   HGB: 10.0 (L)   HCT: 32.5 (L)   RDW: 15.3 (H)   Prothrombin time: 11.8 (H)   Sodium: 135 (L)   Glucose: 229 (H)   Albumin: 2.8 (L)   A-G Ratio: 0.8 (L)   ALT: 138 (H)   Alk. phosphatase: 193 (H)   GLUCOSE,FAST - POC: 191 (H), 270 (H), 92, 244 (H)      CXR:   1. Mild bibasilar atelectasis as described above. Follow-up to resolution is   suggested.  This has improved in the interval.      Physical Exam:   General: Alert, appropriate, NAD. Lungs: Diminished to auscultation bilaterally   Incision: Incision clean, dry and intact and prineo intact   Heart: Regular rate and rhythm  and no murmur, rubs or gallops   Abdomen: Distended, soft, hypoactive bowel sounds, and obese. Extremities: Trace edema BLE, BUE   Neurologic: Moving all extremities purposefully. A&O x4. Sensory intact      MD Consults/Assessments & Plans:   PULMONARY DISEASE:   I. PULMONARY:   1. Acute hypoxic resopiratory failure       a. Covid rapid +, PCR negative      b. D dimer / LE doppler negative - unlikely PE   2. Pulmonary edema/ pleural effusions      a. Ct chest : no PE      Plan:   - Continue nebulized bronchodilators and steroids   - Continue diuresis as tolerated. - Cpap at night   - Might need home o2      II. CARDIOVASCULAR/HEMODYNAMIC:   Off pressors      Plan:   - ICU hemodynamic/cardiac monitoring   - MAP goal > 65 mmHg       III. RENAL:   3. SERGE       Plan:   - Monitor I/Os and UO   - Monitor renal function panel intermittently   - Correct electrolytes as needed   - Avoid nephrotoxic meds       IV. GI/NUTRITION:   Continue reglan   Laxatives to move bowels   Continue cardiac diet      V. INFECTIOUS DISEASE:   4. Covid ruled out with 1 negative PCR      Micro: all cultures negative      VI. ENDO   TSH is elevated   Cont synthroid to 150 mcg once a day       Plan:   - Daily SAT when meets ICU criteria   - ABCDEF mobility bundle   - PT/OT involvement when appropriate       CARDIOTHORACIC SURGERY:   1. Severe symptomatic AS, s/p tissue AVR. Continue ASA. 2. CAD, STEMI, s/p CABG. Continue ASA. Holding amio, statin for transaminitis. Repeat Echo without effusion or tamponade, NL LV/RVF. 3. Acute hypoxic respiratory failure:Extubated, cont to wean O2 as able. Cont diuresis, hourly IS. Nebs BID and PRN. Flutter valve. On 2L NC   4. SERGE: Crt up to 2.01. Avoid nephrotoxins, trend.  Crt 1.00 this am. Change Lasix 40 mg IV BID with scheduled K   5. Acute blood loss anemia: H&H up to 10/32.5 this am   6. Transaminitis. Acute Severe Hepatitis, + hepatomegaly and splenomegaly. Viral panel neg. Triple phase liver scan completed 11/30. No hepatic of splenic ischemia noted. + hepatic steatosis. Labs cont to trend down. 7. Leukocytosis: BC, Sputum and urine cultures NGTD. Abx changed to Meropenem only. WBC up to 22.6, down to 8.7 today, Afebrile. Procal cont to trend down, 0.09 this am   8. HTN: On ACEi, BB PTA; increase metoprolol this am   9. HLD: holding statin. 10. WALLY , not on CPAP. Was unable to tolerate due to anxiety; he has been working on this with Sleep Medicine. OP follow-up. refusing our CPAP, will have his approved for use   11. DMII. On Toujeo at home. Repeat A1C 9.0. Cont lantus    12. GERD. Continue PPI. 13. Anxiety and depression. On Buspar, Zoloft; continue. Supportive care. 14. Ileus: + BMs. Reduce bowel regimen. 15. Nutrition: Advance diet as tolerated, plan to be seen by speech again today. 16. Deconditioning/mobility: OOB TID, ambulating BID. Arm exercises. PT stating IPR over the weekend, will reassess this am. Pt wishes to go home with [facility] PT, will cont to work to see if he is able. Order placed for referral to [facility]       DVT ppx- Lovenox   Dispo- PT/OT. OOB TID, Ambulating BID. Transfer to stepdown when bed available. Case management following to aid in d/c planning, d/c TBD      DIABETES MANAGEMENT TEAM:   Had been on Jhonnie Hopes since surgery 11/22/22; also required vent & pressor support. Abdominal distention related to feces finally resolved with bowel regimen. Extubated to MidFlow 02; now on room air. Not eating well over the weekend but now eating consistently. It is clear his basal insulin needs are less than prior to admission; basal dose reduced. Now eating well. Recommend adding mealtime insulin.        Recommendations:   -Reduce Lantus insulin to 60 units twice daily   -Add Humalog insulin 20 units at meals       PT Notes:   Current Level of Function Impacting Discharge (mobility/balance): CGA to mod A   Patient requiring min A with initial sit to stand demonstrating poor initial standing balance with significant posterior LOB requiring mod A to regain balance. Unsupported static standing balance is fair and unsupported dynamic standing balance is fair to poor. Patient demonstrates poor ability to march in place unsupported with LOB to R. He is unable to stand on one foot without support and turning is very guarded requiring close supervision. Recommendation for discharge: Therapy 3 hours per day 5-7 days per week. Patient is well below his functional baseline and demonstrates increased risk of falls secondary to balance impairments. Patient is an excellent inpatient rehab candidate following discharge from hospital as he is able to tolerate 3 hours of intensive therapy      OT Notes:   Remains limited by his sternal/move in the tube precautions, decreased safety awareness, decreased standing balance, GW, B shoulder weakness, decreased B fine motor coordination and decreased activity tolerance which is impairing his functional independence. At this time he continues to benefit from acute OT and will need inpatient rehab after discharge. Recommendation for discharge: Therapy 3 hours per day 5-7 days per week      SLP Notes:   Recommendations and Planned Interventions:   1. Advance PO diet: Regular texture with Thin liquids   2. Medications orally as tolerated   3. Oral care via standard toothbrush 2-3x/daily   4. Aspiration precautions: Upright to 90 degrees for PO intake, small bites, remain upright for 30 minutes following meals   5.  Set-up feeding assistance        CM Notes:   RUR: 14%   Disposition: IP Rehab      Medications:   Lovenox 30mg SC q12h, Lantus 60 units SC q12h, Ativan 0.5mg IV q8h PRN x1, Duo-Neb 3 mL neb BID, Lasix 40mg IV BID, SSI SC 3 units x1  7 units x1  2 units x1, Humalog 20 units SC TID,         Coronary Artery Bypass Graft (CABG) - Care Day 21 (12/5/2022) by Reta Pantoja       Review Entered Review Status   12/5/2022 1420 Completed      Criteria Review      Care Day: 21 Care Date: 12/5/2022 Level of Care: Step Down    Guideline Day 3    Level Of Care    ( ) Intermediate care    12/5/2022 14:20:21 EST by Jurgen Bañuelos    Clinical Status    (X) * Dangerous arrhythmia absent    12/5/2022 14:20:21 EST by Reta Pantoja      EKG/Rhythm:  NSR-ST 90-100s    ( ) * Pain absent or managed    12/5/2022 14:20:21 EST by Reta Pantoja      4/10 incisional chest pain    Activity    (X) * Minimal ambulatory activity    12/5/2022 14:20:21 EST by [address] [place] [address], [address] [place] [address] with assistance. PT Eval: pt able to progress ambulation trial to a distance of 90ft w/ rollator walker and CGAx1.     Routes    (X) * Oral medications    12/5/2022 14:20:21 EST by Reta Pantoja      Buspar 15mg PO TID, Synthroid 150mcg PO q6AM, Protonix 40mg PO qd, Klor-Con 40 mEq PO BID, Zoloft 100mg PO qd    (X) * Advance diet    12/5/2022 14:20:21 EST by Reta Pantoja      ADULT DIET Easy to Chew; 4 carb choices (60 gm/meal)    Interventions    (X) * Chest tube absent    12/5/2022 14:20:21 EST by Reta Pantoja      Chest tube not indicated    (X) * Central lines absent    12/5/2022 14:20:21 EST by Reta Pantoja      Removed 12/04 Right Central Venous Catheter, Double Lumen    ( ) * Pacing wires absent    12/5/2022 14:20:21 EST by Reta Pantoja      2 atrial wires, 1 bipolar ventricular wire    Medications    (X) * Epidural analgesics absent    12/5/2022 14:20:21 EST by Reta Pantoja      No epidural analgesics - Roxicodone 10mg PO q4h PRN x1    ( ) Prophylactic antibiotics discontinued    12/5/2022 14:20:21 EST by Reta Pantoja      Merrem 1g IV q8h    (X) Aspirin    12/5/2022 14:20:21 EST by Reta Pantoja      Aspirin 81mg PO qd    (X) Beta-blocker    12/5/2022 14:20:21 EST by Gina Hollingsworth      Lopressor 12.5mg PO q12h    * Milestone   Additional Notes   DATE: 12/05/22      Pertinent Updates:   patient refusing the use of CPAP last night, On 5L NC.   resumed Lopressor 12.5mg PO q12h      Vitals:   98.5 °F (36.9 °C) 113 156/73 30 95%  HFNC 6 l/min      Abnl/Pertinent Labs/Radiology/Diagnostic Studies:   RBC: 3.53 (L)   HGB: 9.4 (L)   HCT: 30.3 (L)   RDW: 15.6 (H)   IMMATURE GRANULOCYTES: 3 (H)   ABS. IMM. GRANS.: 0.3 (H)   ABS. EOSINOPHILS: 0.7 (H)   CO2: 33 (H)   Glucose: 118 (H)   BUN: 21 (H)   BUN/Creatinine ratio: 24 (H)   Calcium: 8.3 (L)   Bilirubin, direct: 0.3 (H)   Protein, total: 6.1 (L)   Albumin: 2.7 (L)   A-G Ratio: 0.8 (L)   ALT: 174 (H)   AST: 46 (H)   Alk. phosphatase: 196 (H)   GLUCOSE,FAST - POC: 114, 238 (H)      CXR:   impression: No acute changes      Physical Exam:   General: Alert, appropriate, NAD. Lungs: Diminished to auscultation bilaterally   Incision: Incision clean, dry and intact and prineo intact   Heart: Regular rate and rhythm  and 1/6 systolic murmur, rubs or gallops   Abdomen: Distended, soft, hypoactive bowel sounds, and obese. Extremities: Trace edema BLE, BUE   Neurologic: Non-focal.      MD Consults/Assessments & Plans:   CRITICAL CARE MED:   I. PULMONARY:   1. Acute hypoxic resopiratory failure       a. Covid rapid +, PCR negative      b. D dimer / LE doppler negative - unlikely PE   2. Pulmonary edema/ pleural effusions      a. Ct chest : no PE      Plan:   - Continue nebulized bronchodilators and steroids   - Continue diuresis as tolerated. - Cpap at night   - Might need home o2      II. CARDIOVASCULAR/HEMODYNAMIC:   Off pressors      Plan:   - ICU hemodynamic/cardiac monitoring   - MAP goal > 65 mmHg       III. RENAL:   3. SERGE       Plan:   - Monitor I/Os and UO   - Monitor renal function panel intermittently   - Correct electrolytes as needed   - Avoid nephrotoxic meds       IV.  GI/NUTRITION:   Continue reglan   Laxatives to move bowels   Continue cardiac diet      V. INFECTIOUS DISEASE:   4. Covid ruled out with 1 negative PCR   5. Intermittent fevers      Micro: all cultures negative   [place] port removed yesterday      Plan:   - Cont to observe off meds      VI. ENDO   TSH is elevated   Cont synthroid to 150 mcg once a day       Plan:   - Daily SAT when meets ICU criteria   - ABCDEF mobility bundle   - PT/OT involvement when appropriate       CARDIOTHORACIC SURGERY:   1. Severe symptomatic AS, s/p tissue AVR. Continue ASA. 2. CAD, STEMI, s/p CABG. Continue ASA. Holding amio, statin for transaminitis. Repeat Echo without effusion or tamponade, NL LV/RVF. 3. Acute hypoxic respiratory failure:Extubated, cont to wean O2 as able. Cont diuresis, hourly IS. Nebs BID and PRN. Flutter valve. On 5L NC   4. SERGE: Crt up to 2.01. Avoid nephrotoxins, trend. Crt down to 0.86 this am. Change Lasix 40 mg IV BID    5. Acute blood loss anemia: improving, H&H 9.4/30.3 this am   6. Transaminitis. Acute Severe Hepatitis, + hepatomegaly and splenomegaly. Viral panel neg. Triple phase liver scan completed 11/30. No hepatic of splenic ischemia noted. + hepatic steatosis. Labs cont to trend down. 7. Leukocytosis: BC, Sputum and urine cultures NGTD. Abx changed to Meropenem only. WBC up to 22.6, down to 10.3 today, Afebrile. Procal cont to trend down, 0.09 this am   8. HTN: On ACEi, BB PTA; resume BB when appropriate. 9. HLD: holding statin. 10. WALLY , not on CPAP. Was unable to tolerate due to anxiety; he has been working on this with Sleep Medicine. refusing our CPAP, will have his approved for use   11. DMII. On Toujeo at home. Repeat A1C 9.0. Cont lantus    12. GERD. Continue PPI. 13. Anxiety and depression. On Buspar, Zoloft; continue. Supportive care. 14. Ileus: + BMs. Reduce bowel regimen. 15. Nutrition: Advance diet as tolerated, plan to be seen by speech again today. 16. Deconditioning/mobility: OOB TID, ambulating BID.  Arm exercises. PT stating IPR over the weekend, will reassess this am. Reports were pt is getting up to chair well with min assist       DVT ppx- Heparin subcu   Dispo- PT/OT. OOB TID, Ambulating BID. D/c Strong/ likely transfer to stepdown this afternoon. Case management to aid in d/c planning      DIABETES MANAGEMENT TEAM:   Had been on Ozella Sanjeev since surgery 11/22/22; also required vent & pressor support. Abdominal distention related to feces finally resolved with bowel regime. Extubated to MidFlow 02. Not eating well over the weekend; first true meal this AM.       It is clear his basal insulin needs are less than prior to admission; will reduce basal dose & add mealtime once he is eating >50% of his meals consistently. Recommendations:   -Reduce Lantus insulin to 60 units twice daily   -When he is eating >50% of his meals, give 20 units of Humalog insulin at meals       Medications:   Duo-Neb 3 mL neb BID, Lovenox 30mg SC q12h, Lantus 60 units SC q12h, SSI SC 4 units x2, Lidocaine 4% 2 patch TD q24h, Lasix 40mg IV q8h>BID, Heparin 5000 units SC q8h, Lantus 70 units SC q12h, Reglan 5mg IV q6h,      PT Notes:   Pt self-limiting and fearful of eliciting increased pain levels, exhibiting significantly decreased AROM of BUEs. Pt required CG/minAx1 as well as facilitation of forward weight shift during sit>>stand transfe      Current Level of Function Impacting Discharge (mobility/balance): CG/Marilyn sit<>stand, CGA during ambulation with and without rollator walker support. Gait speed decreased to non functional pace and was noted to decrease in speed further as pt fatigued. Recommendation for discharge: Therapy 3 hours per day 5-7 days per week. Pt continues to function well below his baseline independent level and is most appropriate for additional rehab at discharge. SLP Notes:   Patient tolerated mildly thickened liquids, purees and solids without difficulty and free of s/s aspiration. Patient reports sore throat which was irritated by solid trials. Patient tolerated ice chips and single straw sips of thin liquids without difficulty and free of s/s aspiration. Recommendations and Planned Interventions:   Easy to chew diet   Thin liquids   Sit up for all meals   SMALL/SINGLE sips    May use straw   Medication one at a time with water or puree           Coronary Artery Bypass Graft (CABG) - Care Day 20 (12/4/2022) by Millie White       Review Entered Review Status   12/5/2022 1352 Completed      Criteria Review      Care Day: 20 Care Date: 12/4/2022 Level of Care: ICU    Guideline Day 3    Level Of Care    ( ) Intermediate care    12/5/2022 13:52:50 EST by Millie White      ICU    Clinical Status    (X) * Dangerous arrhythmia absent    12/5/2022 13:52:50 EST by Millie White      Tele: sinus tach without chest pain    ( ) * Pain absent or managed    12/5/2022 13:52:50 EST by Torsten Factor 5/10 post-op pain, 3/10 generalized pain    Activity    (X) * Minimal ambulatory activity    12/5/2022 13:52:50 EST by Millie White      Ambulated patient to chair with 2 assist. Tolerated transfer with minimal assistance. PT and OT with patient. Routes    (X) * Oral medications    12/5/2022 13:52:50 EST by Millie White      Buspar 15mg PO TID, Synthroid 150mcg PO q6AM, Zoloft 100mg PO qd    (X) * Advance diet    12/5/2022 13:52:50 EST by Millie White      Tolerated purees, nectar thick and soft solids well.     (X) IV fluids    12/5/2022 13:52:50 EST by Millie White      D10 250 mL IV PRN x1    Interventions    (X) * Chest tube absent    12/5/2022 13:52:50 EST by Millie White      Chest tube not indicated    (X) * Central lines absent    12/5/2022 13:52:50 EST by Millie White      Removed 12/04 Right Central Venous Catheter, Double Lumen    ( ) * Pacing wires absent    12/5/2022 13:52:50 EST by Millie White      2 atrial wires, 1 bipolar ventricular wire Medications    (X) * Epidural analgesics absent    12/5/2022 13:52:50 EST by Jyoti Álvarez      No epidural analgesics - Roxicodone 10mg PO q4h PRN x2    ( ) Prophylactic antibiotics discontinued    12/5/2022 13:52:50 EST by Jyoti Álvarez      Merrem 1g IV q8h    (X) Aspirin    12/5/2022 13:52:50 EST by Jyoti Álvarez      Aspirin 81mg PO qd    * Milestone   Additional Notes   DATE: 12/04/22      Pertinent Updates:   -Tachypneic this AM (RR: 33 - 35 - 32 - 38 - 31 - 27 - 30)    -Intermittent tachycardia (AZ: 102 - 104 - 105 - 109 - 103 - 108 - 105)   -Patient refused to wear the hospital Bipap,because he doesn't like the mask and hasn't been sleeping well. Patient currently on 6L HFNC and SpO2 =96   -Blood glucose 56. D10 infusion given per hypoglycemia protocol   -Potassium replacement ordered per replacement protocol   - Given Roxicodone 10 mg PO for C/O generalized aching post PT/OT therapy      Vitals:   98.7 °F (37.1 °C) 108 146/79 38 98% HFNC 6 l/min      Abnl/Pertinent Labs/Radiology/Diagnostic Studies:   WBC: 12.5 (H)   RBC: 3.37 (L)   HGB: 9.2 (L)   HCT: 30.6 (L)   RDW: 16.0 (H)   MONOCYTES: 4 (L)   EOSINOPHILS: 11 (H)   ABS. NEUTROPHILS: 8.1 (H)   ABS. EOSINOPHILS: 1.4 (H)   INR: 1.2 (H)   Prothrombin time: 12.7 (H)   D-dimer: 3.75 (H)   Sodium: 148 (H)   Chloride: 110 (H)   BUN: 32 (H)   BUN/Creatinine ratio: 33 (H)   Calcium: 8.4 (L)   Bilirubin, direct: 0.3 (H)   Protein, total: 5.6 (L)   Albumin: 2.7 (L)   A-G Ratio: 0.9 (L)   ALT: 248 (H)   AST: 80 (H)   Alk. phosphatase: 248 (H)   C-Reactive protein: 3.40 (H)   GLUCOSE,FAST - POC: 56 (L), 56 (L), 213 (H), 161 (H), 168 (H), 220 (H)      CXR:   Removal of the NG tube. The lungs are clear. Physical Exam:   Ivory Scripture, well built   HEENT: normal   Heart: s1,s2   Lungs: clear   Abd: soft   Ext: no edema   Cns: AAO x4       MD Consults/Assessments & Plans:   CRITICAL CARE MED:   I. PULMONARY:   1. Acute hypoxic resopiratory failure       a.  Covid rapid +, PCR negative      b. D dimer / LE doppler negative - unlikely PE   2. Pulmonary edema/ pleural effusions      a. Ct chest : no PE      Plan:   - Continue nebulized bronchodilators and steroids   - Continue diuresis as tolerated. II. CARDIOVASCULAR/HEMODYNAMIC:   Off pressors      Plan:   - ICU hemodynamic/cardiac monitoring   - MAP goal > 65 mmHg       III. RENAL:   3. SERGE       Plan:   - Monitor I/Os and UO   - Monitor renal function panel intermittently   - Correct electrolytes as needed   - Avoid nephrotoxic meds       IV. GI/NUTRITION:   Continue reglan   Laxatives to move bowels   D/c NGT and re evaluate with speech       V. INFECTIOUS DISEASE:   4. Covid ruled out with 1 negative PCR   5. Intermittent fevers      Micro: all cultures negative   [place] port removed yesterday      Plan:   - Cont to observe off meds      VI. ENDO   TSH is elevated   Cont synthroid to 150 mcg once a day       Plan:   - Daily SAT when meets ICU criteria   - ABCDEF mobility bundle   - PT/OT involvement when appropriate       CARDIOTHORACIC SURGERY:   No change overnight   Remains on high flow    Slowly progressing      Medications:   Duo-Neb 3 mL neb BID, Lidocaine 4% 2 patch TD q24h, Ativan 0.5mg IV q8h PRN x1,    KCL 20 mEq IV q1h x2, Lasix 40mg IV q8h, Heparin 5000 units SC q8h, Lantus 70 units SC q12h, SSI SC 3 units x2, Reglan 5mg IV q6h, Protonix 40mg IV qd, Scopolamine 1mg 1 patch TD q72h      PT Notes:   Patient on 6L HF NC through session. Continues to demonstrates limited use of UE's. Patient requiring Min A for rolling and Mod A supine sit with minimal cuing to maintain precautions. Requiring CGA during sit<>stand with patient initiating correct position of hands for transfer. Current Level of Function Impacting Discharge (mobility/balance): Min-Mod A bed mobility, CGA transfers, CGA ambulation x25ft with rollator      Recommendation for discharge:Therapy 3 hours per day 5-7 days per week.         Coronary Artery Bypass Graft (CABG) - Care Day 19 (12/3/2022) by Olvin George       Review Entered Review Status   12/5/2022 1326 Completed      Criteria Review      Care Day: 19 Care Date: 12/3/2022 Level of Care: ICU    Guideline Day 3    Level Of Care    ( ) Intermediate care    12/5/2022 13:26:58 EST by Olvin George      ICU    Clinical Status    (X) * Dangerous arrhythmia absent    12/5/2022 13:26:58 EST by Olvin George      Normal sinus rhythm    ( ) * Pain absent or managed    12/5/2022 13:26:58 EST by Oscar Callahan with morphine 10 mg via NG tube for pain, per patient request.    Activity    (X) * Minimal ambulatory activity    12/5/2022 13:26:59 EST by Olvin George      PT and OT with patient. Walked in room and hallway    Routes    (X) * Oral medications    12/5/2022 13:26:59 EST by Olvin George      Buspar 15mg PO TID, Synthroid 150mcg PO q6AM, Ativan 1mg PO q8h PRN x1, Miralax 17g PO qd, Zoloft 100mg PO qd, Pericolace 2 tabs PO qd    (X) * Advance diet    12/5/2022 13:26:59 EST by Olvin George      Tolerated purees, nectar thick and soft solids well.     Interventions    (X) * Chest tube absent    12/5/2022 13:26:59 EST by Olvin George      Chest tube not indicated    ( ) * Central lines absent    12/5/2022 13:26:59 EST by Olvin George      Right Central Venous Catheter, Double Lumen    ( ) * Pacing wires absent    12/5/2022 13:26:59 EST by Olvin George      2 atrial wires, 1 bipolar ventricular wire    Medications    (X) * Epidural analgesics absent    12/5/2022 13:26:59 EST by Olvin George      No epidural analgesics - Roxicodone 10mg PO q4h PRN x1, Morphine 10mg PO q6h PRN x1    ( ) Prophylactic antibiotics discontinued    12/5/2022 13:26:59 EST by Olvin George      Merrem 1g IV q8h    (X) Aspirin    12/5/2022 13:26:59 EST by Olvin George      Aspirin 81mg PO qd    * Milestone   Additional Notes   DATE: 12/03/22      Pertinent Updates:   -on mid flow at 6 L, bipap at night, c/o pain and want something for pain   -Febrile this morning    -Patient anxious at intervals, Ativan 1 mg per NG tube given. -Speech therapy at bedside. After screening, patient safe for nectar thick liquids. NG tube removed. -Incontinent of large amount brown liquid stool in chair, then to [facility] where another large liquid brown stool was passed. Vitals:   101.1 °F (38.4 °C) 109 90/52 35 97 HFNC 6 l/min      Abnl/Pertinent Labs/Radiology/Diagnostic Studies:   WBC: 16.3 (H)   NRBC: 0.2 (H)   RBC: 3.45 (L)   HGB: 9.1 (L)   HCT: 30.9 (L)   MCHC: 29.4 (L)   RDW: 16.3 (H)   ABSOLUTE NRBC: 0.03 (H)   ABS. NEUTROPHILS: 11.4 (H)   ABS. MONOCYTES: 1.6 (H)   ABS. EOSINOPHILS: 0.7 (H)   ABS. BASOPHILS: 0.2 (H)   Sodium: 148 (H)   Potassium: 3.4 (L)   Chloride: 110 (H)   Glucose: 187 (H)   BUN: 45 (H)   BUN/Creatinine ratio: 37 (H)   Bilirubin, direct: 0.3 (H)   Protein, total: 6.2 (L)   Albumin: 2.6 (L)   A-G Ratio: 0.7 (L)   ALT: 298 (H)   AST: 82 (H)   Alk. phosphatase: 270 (H)   Procalcitonin: 0.25   Sed rate, automated: 66 (H)   GLUCOSE,FAST - POC: 160 (H), 84, 216 (H), 188 (H)   Sputum Cx: Not detected   Covid 19: Negative   RSV: Not detetced    ANCA PANEL: Pending    WENDY, DIRECT, W/REFLEX: Pending       CXR:    Unchanged trace interstitial edema pattern. Physical Exam:   Alberteen Herd, well built   HEENT: normal   Heart: s1,s2   Lungs: clear   Abd: soft   Ext: no edema   Cns: AAO x4       MD Consults/Assessments & Plans:   CRITICAL CARE MED:   I. PULMONARY:   1. Acute hypoxic resopiratory failure       a. Covid rapid +, PCR negative      b. D dimer / LE doppler negative - unlikely PE   2. Pulmonary edema/ pleural effusions      a. Ct chest : no PE      Plan:   - Continue nebulized bronchodilators and steroids   - Continue diuresis as tolerated. II. CARDIOVASCULAR/HEMODYNAMIC:   Off pressors      Plan:   - ICU hemodynamic/cardiac monitoring   - MAP goal > 65 mmHg       III. RENAL:   3.  SERGE Plan:   - Monitor I/Os and UO   - Monitor renal function panel intermittently   - Correct electrolytes as needed   - Avoid nephrotoxic meds       IV. GI/NUTRITION:   Continue reglan   Laxatives to move bowels   D/c NGT and re evaluate with speech       V. INFECTIOUS DISEASE:   4. Covid ruled out with 2 negative PCR   5. Intermittent fevers      Micro: all cultures negative      Plan:   - Cont to observe off meds      VI. ENDO   TSH is elevated   Cont synthroid to 150 mcg once a day       Plan:   - Daily SAT when meets ICU criteria   - ABCDEF mobility bundle   - PT/OT involvement when appropriate       CARDIOTHORACIC SURGERY:   Stable on high flow O2   Up on side of bed   No major changes   Will resume feeds when off high flow       Medications:   Duo-Neb 3 mL neb BID, Lidocaine 4% 2 patch TD q24h, Ativan 0.5mg IV q8h PRN x1, KCL 20 mEq IV q2h x6, Dulcolax 10mg RE qd, Lasix 40mg IV q8h, Heparin 5000 units SC q8h, Lantus 70 units SC q12h, SSI SC 3 units x2  4 units 1, Reglan 5mg IV q6h, Protonix 40mg IV qd      PT Notes:   Patient demonstrating poor functional use of UEs/hands requiring minimal A to bring oral swab to mouth and to perform cardiac exercises. He requires CGA/min A of 2 for sit<>stand transfers      Current Level of Function Impacting Discharge (mobility/balance): CGA/min A of 1-      Recommendation for discharge:Therapy 3 hours per day 5-7 days per week. Recommend inpatient rehab following discharge to regain functional independence prior to returning home with support of family       SLP Notes:   He coughed after thins; strong cough. Tolerated purees and soft solids well. He had a delayed cough after drinking 4 ounces of mildly thick liquids. Suspect he will do well with po. He will need to be fed due to his upper ext edema. He needs to try to feed himself. Recommendations and Planned Interventions:   Initiate easy to chew diet with mildly thick liquids. Reeval Monday. Imaging as needed. Coronary Artery Bypass Graft (CABG) - Care Day 18 (12/2/2022) by Consuelo Dunbar       Review Entered Review Status   12/5/2022 1326 Completed      Criteria Review      Care Day: 18 Care Date: 12/2/2022 Level of Care: ICU    Guideline Day 3    Level Of Care    ( ) Intermediate care    12/2/2022 16:58:06 EST by Consuelo Dunbar      ICU    Clinical Status    (X) * Dangerous arrhythmia absent    12/2/2022 16:58:06 EST by Consuelo Dunbar      Normal sinus rhythm    ( ) * Pain absent or managed    12/2/2022 16:58:06 EST by Tony Setting 6/10 post-op incisional pain    Activity    (X) * Minimal ambulatory activity    12/2/2022 16:58:06 EST by Consuelo Dunbar      Pt up to chair with two assist    Routes    (X) * Oral medications    12/2/2022 16:58:06 EST by Consuelo Dunbar      Buspar 15mg PO TID, Synthroid 150mcg PO q6AM, Ativan 1mg PO q8h PRN x1  Miralax 17g PO qd, Pericolace 2 tab PO qd, Zoloft 100mg PO qd    (X) * Advance diet    12/2/2022 16:58:06 EST by Consuelo Dunbar      ADULT TUBE FEEDING Nasogastric; 2.0 Calorie; Delivery Method: Continuous; Continuous Initial Rate (mL/hr): 10; Continuous Advance Tube Feeding: Yes; Advancement Volume (mL/hr): 10;  Advancement Frequency: Q 12 hours; Continuous Goal Rate (mL/hr): 5    Interventions    (X) * Chest tube absent    12/2/2022 16:58:06 EST by Consuelo Dunbar      Chest tube not indicated    ( ) * Central lines absent    12/2/2022 16:58:06 EST by Consuelo Dunbar      Right Central Venous Catheter, Double Lumen    ( ) * Pacing wires absent    12/2/2022 16:58:06 EST by Consuelo Dunbar      2 atrial wires, 1 bipolar ventricular wire    Medications    (X) * Epidural analgesics absent    12/2/2022 16:58:06 EST by Consuelo Dunbar      No epidural analgesics - Dilaudid 0.5mg IV q4h PRN x3    ( ) Prophylactic antibiotics discontinued    12/2/2022 16:58:06 EST by Consuelo Dunbar      Merrem 1g IV q8h, Eraxis 100mg IV q24h, Vancomycin 1250mg IV q12h    (X) Aspirin    12/2/2022 16:58:06 EST by Abdirahman Blake      Aspirin 81mg PO qd    * Milestone   Additional Notes   DATE: 12/02/22      Pertinent Updates:   -Patient was extubated yesterday, currently sating 94% on 50% HFNC   -continues on a lasix gtt @5.   -Off all pressors, SBP 100s this am.    -T max 100.9 continues on Eraxis, Meropenem and Vancomycin, procal 0.55 this am   -procal 0.55 this am      Vitals:   100.8 °F (38.2 °C) 110 88/50 34 94% HFNC 10L/min      Abnl/Pertinent Labs/Radiology/Diagnostic Studies:   GLUCOSE,FAST - POC: 125 (H)   WBC: 15.5 (H)   NRBC: 0.1 (H)   RBC: 3.33 (L)   HGB: 8.8 (L)   HCT: 28.9 (L)   RDW: 15.7 (H)   ABSOLUTE NRBC: 0.02 (H)   ABS. NEUTROPHILS: 10.4 (H)   ABS. EOSINOPHILS: 0.8 (H)   ABS. BASOPHILS: 0.2 (H)   INR: 1.2 (H)   Prothrombin time: 12.2 (H)   Sodium: 148 (H)   Chloride: 110 (H)   Glucose: 132 (H)   BUN: 49 (H)   BUN/Creatinine ratio: 39 (H)   Bilirubin, direct: 0.3 (H)   Protein, total: 6.1 (L)   Albumin: 2.4 (L)   A-G Ratio: 0.6 (L)   ALT: 388 (H)   AST: 102 (H)   Alk. phosphatase: 297 (H)      CXR:   Interval extubation with improved vascular clarity/aeration of the lungs. Physical Exam:   General: fatigued but well appearing   Skin: Extremities and face reveal no rashes. Few petichiae on pads of fingers likely from fingersticks none on toes. Median sternotomy incision c/d/I healing. HEENT: Sclerae anicteric. high flow NC in place. NGT in place   Cardiovascular: Regular rate and rhythm. No appreciable murmur. Respiratory: symmetric chest rise   GI: Obese. Soft. mild distension. Bowel sounds are appreciated much improved compared to Wednesday   Musculoskeletal:  1+ edema in arms and legs   Neurological: Awake. Conversive   Psychiatric: pleasant, affect congruent       MD Consults/Assessments & Plans:   CRITICAL CARE MED:   I. PULMONARY:   1. Acute hypoxic resopiratory failure       a.  Covid rapid +, PCR negative      b. D dimer / LE doppler negative - unlikely PE 2. Pulmonary edema/ pleural effusions      a. Ct chest : no PE      Plan:   - stop heparin    - Vent settings established, reviewed and/or adjusted as per orders   - Continue nebulized bronchodilators and steroids   - Continue diuresis as tolerated. II. CARDIOVASCULAR/HEMODYNAMIC:   Off pressors      Plan:   - ICU hemodynamic/cardiac monitoring   - MAP goal > 65 mmHg       III. RENAL:   3. SERGE       Plan:   - Monitor I/Os and Uo   - Monitor renal function panel intermittently   - Correct electrolytes as needed   - Avoid nephrotoxic meds       IV. GI/NUTRITION:   CT abdomen : normal liver vasculature   Hepatic steatosis : suspect cholestasis as the cause for the elevated LFT's   Continue reglan   Laxatives to move bowels   Trickle feeds and also eval for swallowing. TF can stopped if he starts eating adequately. V. INFECTIOUS DISEASE:   4. Covid ruled out with 2 negative PCR   5. Intermittent fevers     a. Cultures pending        - Pro rosy is only slightly elevated      Plan:   - Cont broad spectrum abx    - Add eraxis   ? Non infectious      VI. NEURO   Sedation for vent synchrony      VII. ENDO   TSH is elevated   Increase synthroid to 150 mcg once a day       Plan:   - Daily SAT when meets ICU criteria   - ABCDEF mobility bundle   - PT/OT involvement when appropriate       CARDIOTHORACIC SURGERY:   1. Severe symptomatic AS, s/p tissue AVR. Continue ASA. 2. CAD, STEMI, s/p CABG. Continue ASA. Holding amio, statin for transaminitis. Repeat Echo without effusion or tamponade, NL LV/RVF. 3. Acute hypoxic respiratory failure: Now extubate, wean of HF as able. Continue diuresis, encourage IS. Nebs BID and PRN. Flutter valve ordered. 4. SERGE: Crt up to 2.01. Avoid nephrotoxins, trend. Crt 1.27 this am. Transition to Lasix 40 mg IV TID. 5. Acute blood loss anemia: H&H 8.8/28.9 this am   6. Transaminitis. Acute Severe Hepatitis, patient now with hepatomegaly and splenomegaly. Viral panel sent.  Triple phase liver scan completed 11/30. No hepatic of splenic ischemia noted. + hepatic steatosis. Labs cont to trend down. 7. Leukocytosis: BC, Sputum and urine cultures NGTD. Abx changed to Meropenem and Vanco. Eraxis started by ICU, fungal cx NGTD. WBC up to 22.6, 15.5 today, Tm 100.9. Procal cont to trend down, 0.55.    8. HTN. On ACEi, BB PTA; resume BB when appropriate. 9. HLD. holding statin. 10. WALLY , not on CPAP. Was unable to tolerate due to anxiety; he has been working on this with Sleep Medicine. OP follow-up. 11. DMII. On Toujeo at home. Repeat A1C 9.0. Cont lantus, if this does not bring BG < 200 pt will need to go back on Insulin gtt. 12. Hypothyroidism. On Synthroid PTA; continue. Repeat TSH 1.96.    13. GERD. Continue PPI. 14. Anxiety and depression. On Buspar, Zoloft; continue. Supportive care. 15. Ileus: BM x 2 yesterday, NGT clamped last HS, 10cc residual this am, Speech eval ordered, will remove NGT and advance diet if he passes his swallow eval.      DVT ppx- Heparin subcu   Dispo- remain in ICU. GASTROENTEROLOGY:   Very likely has baseline NAFLD. Serologies for other causes of liver injury are negative. LFTs are much improved and continue to trend down. Bili is normal and INR is near normal.         Leukocytosis is improved with no bands and decreased eos. Still febrile but cx negative. KUB showed prominent fecal stasis but no SB dilation. He had a good BM yesterday and now has normal bowel sounds. Advance diet as tolerated. Will chart review to follow alpha-gal antibodies to ensure no allergy against pig/lamb/cow though lower suspicion. If positive, this could be catastrophic given recent porcine valve. DIABETIC MANAGEMENT TEAM:   Change in basal insulin dosing has brought Bgs to goal.   Finally extubated to MidFlow 02. With plans to transition to oral feeding noted. Basal insulin dosing appropriate.  When he is actually eating >50%, a mealtime dose of insulin will be needed      - Continue Lantus insulin to 70 units twice daily   - When he is eating >50% of his meals, give 20 units of Humalog insulin at meals       DIETICIAN:   Pt for SLP consult today. NGT remains in place with minimal OP. BM's noted yesterday. Okay to start trophic TF and advance slowly as tolerated given refeeding risk (pt NPO/minimal nutrition for 8+ days). He is also hypernatremic so 150mL q 4h flushes started. As diet advances and pt demonstrates a good appetite can wean off of TF. Medications:   Dulcolax 10mg RE qd, Precedex 0.5-1.1 mcg/kg/hr IV titrate, Lasix 40mg IV q8h,   Heparin 5000 units SC q8h, Lidocaine 4% 2 patch TD q24h, Reglan 5mg IV q6h, Protonix 40mg IV qd, MgSO4 1gIV once, KCL 20 mEq IV q2h x2, Lasix 10mg/hr IV      PT Notes: The patient presents with decreased functional mobility from baseline level of function. Currently limited by generalized weakness, impaired balance, gait instability, and decreased activity tolerance. Patient very anxious and  needs extra time as well as reassurance with all mobility. Patient currently well below his functional baseline and recommend IPR to progress him to more independent level of function. Recommendation for discharge:Therapy 3 hours per day 5-7 days per week       OT Notes:   Barthel Index Score of 10/100 indicative of totally dependent.    Recommendation for discharge: Therapy 3 hours per day 5-7 days per week [facility] pending progress

## 2022-12-09 NOTE — PROGRESS NOTES
Transition of Care Plan:     RUR:  14% \"low risk\"  Disposition:  IPR- Accepted at [facility] pending bed availability  If SNF or IPR: Date FOC offered:  12/6/22  Date [address] received:  12/6/22  Date authorization started with reference number: 12/08/22  Date authorization received and expires: 12/10/22  Accepting facility: [facility]  Follow up appointments:  PCP & Specialists as needed  DME needed:  TBD  Transportation at Discharge:  S transport  [address] or means to access home:    mother    IM Medicare Letter:  n/a  Is patient a  and connected with the [place]? No  Caregiver Contact: Pt's mother Steve Zelaya [phone]  Discharge Caregiver contacted prior to discharge? Care Conference needed?: Not at this time    Initial note: Chart reviewed for updates. CM contacted [facility] admission liaison Philippe Sunshine [phone] to follow up on insurance authorization. Philippe Sunshine reported that insurance Lore Vance was approved 12/08/22. Philippe Sunshine will call CM once she confirms bed availability. CM will continue to follow up.     JOSE MIGUEL Camejo    [phone]

## 2022-12-09 NOTE — PROGRESS NOTES
End of Shift Note    Bedside shift change report given to Daphne (oncoming nurse) by Jacqueline Aguilar (offgoing nurse). Report included the following information SBAR, Kardex, OR Summary, Intake/Output, MAR, Recent Results, and Cardiac Rhythm NSR    Shift worked:  7 am to 7 pm     Shift summary and any significant changes:    Pt worked with PT/OT. Pt walking halls and sitting in recliner. Pt tolerating diet. Pt had one hypoglycemic episode during shift (medication adjustments were made by provider). Pain medication given once during shift. Concerns for physician to address:  none     Zone phone for oncoming shift:   [phone]       Activity:  Activity Level: Up with Assistance  Number times ambulated in hallways past shift:2  Number of times OOB to chair past shift: 3    Cardiac:   Cardiac Monitoring: Yes      Cardiac Rhythm: Sinus Rhythm    Access:  Current line(s): PIV     Genitourinary:   Urinary status: voiding    Respiratory:   O2 Device: None (Room air)  Chronic home O2 use?: YES  Incentive spirometer at bedside: YES  Actual Volume (ml): 1750 ml    GI:  Last Bowel Movement Date: 12/08/22  Current diet:  ADULT ORAL NUTRITION SUPPLEMENT Breakfast, Lunch, Dinner; Low Calorie/High Protein  ADULT DIET Regular; 4 carb choices (60 gm/meal)  Passing flatus: YES  Tolerating current diet: YES       Pain Management:   Patient states pain is manageable on current regimen: YES    Skin:  Sree Score: 21  Interventions: increase time out of bed and PT/OT consult    Patient Safety:  Fall Score:  Total Score: 3  Interventions: bed/chair alarm, assistive device (walker, cane, etc), gripper socks, and pt to call before getting OOB  High Fall Risk: Yes    Length of Stay:  Expected LOS: 1d 19h  Actual LOS: [place] 69

## 2022-12-09 NOTE — PROGRESS NOTES
CSS FLOOR Progress Note    Admit Date: 11/15/2022  POD: 17 Days Post-Op      Procedure:  Procedure(s):  SYLWIA BY DR Eric Liz -  CORONARY ARTERY BYPASS GRAFT  X 1 WITH LEFT INTERNAL MAMMARY ARTERY GRAFTING - AORTIC VALVE REPLACEMENT WITH MEYER 25MM INSPIRIS RESILIA TISSUE VALVE AND REPAIR OF ASCENDING AORTIC ANEURYSM    Subjective:   Pt seen with Dr. Aundrea Mims  RA  SR  Afebrile. Normotensive. . Case management aware pt is going home today. Diabetes management following. Pt says he doesn't feel as well today as yesterday. Objective:     Visit Vitals  BP (!) 111/47   Pulse 98   Temp 98.2 °F (36.8 °C)   Resp 17   Ht 5' 9\" (1.753 m)   Wt 238 lb 12.1 oz (108.3 kg)   SpO2 97%   BMI 35.26 kg/m²     Temp (24hrs), Av.1 °F (36.7 °C), Min:97.8 °F (36.6 °C), Max:98.2 °F (36.8 °C)      Last 24hr Input/Output:    Intake/Output Summary (Last 24 hours) at 2022 0948  Last data filed at 2022 0343  Gross per 24 hour   Intake 1320 ml   Output 3500 ml   Net -2180 ml        EKG/Rhythm: SR      Oxygen: RA    CXR: portable chest AP view     FINDINGS: The cardiac silhouette is within normal limits. The pulmonary  vasculature is within normal limits. Prior sternotomy. The lungs and pleural spaces are clear. The visualized bones and upper abdomen  are age-appropriate. IMPRESSION     No acute process on portable chest.       Admission Weight: Last Weight   Weight: 257 lb 15 oz (117 kg) Weight: 238 lb 12.1 oz (108.3 kg)       EXAM:  General: Obese man in NAD sitting on window seat. His mother is at the bedside. Lungs:   Clear to auscultation bilaterally. Unlabored on room air. Incision:  No erythema, drainage or swelling. Prineo removed. Heart:  Regular rate and rhythm. Soft murmur. Abdomen:   Soft, non-tender. Normally distended according to habitus. Bowel sounds normal. Diastasis recti. No organomegaly. BM yesterday. Takes po wo problem. Extremities:  Trace edema. Pulses weak.    Scars and discolorations BLE. Hairless legs. Neurologic:  Gross motor and sensory apparatus intact. Alert, OX3, COKER to w/c. Activity: ad willy    Diet:  60 Gm carb controlled diabetic. Lab Data Reviewed:   Recent Labs     22  0334   WBC  --  8.5   HGB  --  9.5*   HCT  --  31.0*   PLT  --  257   NA  --  132*   K  --  5.1   BUN  --  22*   CREA  --  1.12   GLU  --  365*   GLUCPOC 326*  --          Assessment:     Active Problems:    Acute non-Q wave non-ST elevation myocardial infarction (NSTEMI) ([facility] [address] (11/15/2022)      Aortic stenosis (2022)      CAD (coronary artery disease) (2022)      S/P CABG x 1 (2022)      Overview: On pump CORONARY ARTERY BYPASS GRAFT  X 1 WITH LEFT INTERNAL MAMMARY       ARTERY to LAD      AORTIC VALVE REPLACEMENT WITH MEYER 25MM INSPIRIS RESILIA TISSUE VALVE       REPAIR OF ASCENDING AORTIC ANEURYSM with 26mm hemashield graft      S/P AVR (aortic valve replacement) and aortoplasty (2022)      Overview: On pump CORONARY ARTERY BYPASS GRAFT  X 1 WITH LEFT INTERNAL MAMMARY       ARTERY to LAD      AORTIC VALVE REPLACEMENT WITH MEYER 25MM INSPIRIS RESILIA TISSUE VALVE       REPAIR OF ASCENDING AORTIC ANEURYSM with 26mm hemashield graft             Plan/Recommendations/Medical Decision Makin yo man POD 16 s/p tissue AVR, CABX1, ascending aortic aneurysm repair. Prolonged hospitalization due to pulmonary issues. Severe symptomatic AS, s/p tissue AVR. Continue ASA. CAD, STEMI, s/p CABG:ASA, statin, lisinopril,  and beta blocker. Repeat Echo without effusion or tamponade, NL LV/RVF. On statin. Acute hypoxic respiratory failure: Cont diuresis, hourly IS. Nebs BID and PRN. Flutter valve. On RA  SERGE: Crt up to 2.01. Nephrology consult appreciated, avoid nephrotoxins, trend. Crt 1.12 this am. Cont Lasix 40 mg daily PO  Acute blood loss anemia: improving, H&H up to 9.5/31 this am  Transaminitis.  Acute Severe Hepatitis, GI consult appreciated, + hepatomegaly and splenomegaly. Viral panel neg. Triple phase liver scan completed 11/30. No hepatic or splenic ischemia noted. + hepatic steatosis. Labs cont to trend down. No amiodarone. Statin restarted. Leukocytosis: resolved. BC, Sputum and urine cultures NGTD. ID following, Completed Meropenem. Rapid COVID pos, 2 PCR negative. WBC up to 22.6, 8.5 today, Afebrile. HTN: metoprolol. Will need to restart lisinopril when BP is more robust and creatinine normal. Preop creatinine was 0.97. HLD: statin restarted. Check LFTs in a month. WALLY , not on CPAP. Was unable to tolerate due to anxiety; he has been working on this with Sleep Medicine. OP follow-up. refusing CPAP  DMII. On Toujeo at home. Repeat A1C 9.0. Diabetes management following. Cont lantus and mealtime coverage. Call and text out to Diabetes Management CNS, Alden Darby for input for discharge insulin orders. GERD. Continue PPI. Anxiety and depression. On Buspar, Zoloft; continue. Supportive care. Ileus: resolved. + BMs. Reduce bowel regimen. Nutrition:carb controlled diet. Had discussion with patient regarding diet. Recommended he learn to count carbs and limit carbs to 60 grams per meal.  Discussed the possible effect of uncontrolled diabetes on wound healing. Deconditioning/mobility: OOB TID, ambulating BID. Arm exercises. Home PT. Discharge to home today with home PT and home health nursing.    Signed By: Nain Roa NP

## 2022-12-09 NOTE — PROGRESS NOTES
Bedside and Verbal shift change report given to Karel (oncoming nurse) by Kingston Irving (offgoing nurse). Report included the following information SBAR, Kardex, Procedure Summary, Intake/Output, MAR, and Recent Results.

## 2022-12-09 NOTE — PROGRESS NOTES
Problem: Self Care Deficits Care Plan (Adult)  Goal: *Acute Goals and Plan of Care (Insert Text)  Description:     FUNCTIONAL STATUS PRIOR TO ADMISSION: Patient was independent and active without use of DME.    HOME SUPPORT: The patient lived with his mother. Occupational Therapy Goals  Re-evaluation 12/2/2022; Goals Met 12/9  1. Patient will perform grooming sitting edge of bed or unsupported in chair with mod I after set up within 7 day(s). - met  2. Patient will tolerate > or = 5 minutes static standing for functional task without UE support within 7 day(s). - met  3. Patient will follow move in the tube precaution for bed mobility, sit to stand, stand to sit with 1-2 verbal cues within 7 day(s). - met  4. Patient will transfer to and from toilet and complete all toileting tasks with CGA to minimal assist within 7 day(s). - met  5. Patient will perform cardiac exercises with handout and min cues within 7 day(s). Initiated 11/23/2022  1. Patient will perform grooming standing at sink with supervision/set-up within 7 day(s). 2.  Patient will perform upper body dressing with supervision/set-up within 7 day(s). 3.  Patient will perform lower body dressing with supervision/set-up within 7 day(s). 4.  Patient will perform toilet transfers with supervision/set-up within 7 day(s). 5.  Patient will perform all aspects of toileting with supervision/set-up within 7 day(s).     Outcome: Resolved/Met   OCCUPATIONAL THERAPY TREATMENT/DISCHARGE  Patient: Lalita Cano (84 y.o. male)  Date: 12/9/2022  Diagnosis: Acute non-Q wave non-ST elevation myocardial infarction (NSTEMI) ([facility]) [I21.4]  Aortic stenosis [I35.0]  CAD (coronary artery disease) [I25.10] <principal problem not specified>  Procedure(s) (LRB):  SYLWIA BY DR Yosi Taveras -  CORONARY ARTERY BYPASS GRAFT  X 1 WITH LEFT INTERNAL MAMMARY ARTERY GRAFTING - AORTIC VALVE REPLACEMENT WITH MEYER 25MM INSPIRIS RESILIA TISSUE VALVE AND REPAIR OF ASCENDING AORTIC ANEURYSM (N/A) 17 Days Post-Op  Precautions: Sternal (move in the tube)  Chart, occupational therapy assessment, plan of care, and goals were reviewed. ASSESSMENT  Patient continues with skilled OT services and is progressing towards goals. Patient is received sitting EOB, agreeable to ADL training with emphasis on safe showering. Patient dons hospital socks, transferring into shower and sitting on bench with supervision - SBA. Patient demonstrates good carryover of initial instructions and overall Move in the Tube, benefiting from minimal cues for safety and novel technique to include patting over incision sites. Patient benefits from up to minimal assistance for his back as he has impaired shoulder ROM at baseline; encouraged patient to obtain long-handled sponge to facilitate greater ease of posterior upper-body and lower-body bathing. Patient tolerates activity well with brief intermittent rest breaks throughout. Patient has made excellent progress over last reporting period, meeting functional performance goals. For best outcome, recommend  follow-up to address home safety/set-up and activity modifications in context. Patient is verbalizing and is demonstrating understanding of mindful-based movements (\"move in the tube\") principles of keeping UEs proximal to ribcage to prevent lateral pull on the sternum during load-bearing activities with verbal cues required for compliance.      Current Level of Function (ADLs/self-care): up to min A for aspects of bathing; supervision - SBA    Other factors to consider for discharge: supportive family, present for training and education         PLAN :  Rationale for discharge: Goals achieved  Recommend with staff: SBA for safety  Recommendation for discharge: (in order for the patient to meet his/her long term goals)  Occupational therapy at least 2 days/week in the home AND ensure assist and/or supervision for safety with bathing    This discharge recommendation:  Has been made in collaboration with the attending provider and/or case management    IF patient discharges home will need the following DME:AE: long handled bathing       SUBJECTIVE:   Patient stated This warm water is making me feel better.     OBJECTIVE DATA SUMMARY:   Cognitive/Behavioral Status:  Neurologic State: Alert; Appropriate for age  Orientation Level: Oriented X4  Cognition: Follows commands  Perception: Appears intact  Perseveration: No perseveration noted  Safety/Judgement: Awareness of environment; Fall prevention;Home safety    Functional Mobility and Transfers for ADLs:  Bed Mobility:       Transfers:  Sit to Stand: Supervision;Stand-by assistance  Functional Transfers  Bathroom Mobility: Supervision/set up;Stand-by assistance     Balance:  Sitting: Intact  Standing - Static: Good  Standing - Dynamic : Good    ADL Intervention:  Upper Body Bathing  Bathing Assistance: Minimum assistance  Position Performed: Seated in chair  Cues: Verbal cues provided;Physical assistance    Type of Bath: Chlorhexidine (CHG); Full    Lower Body Bathing  Lower Body : Stand-by assistance;Minimum assistance; Compensatory technique training    Upper Body Dressing Assistance  Dressing Assistance: Minimum assistance  Pullover Shirt: Minimum assistance; Compensatory technique training (2/2 fatigue following shower)  Cues: Verbal cues provided;Physical assistance    Lower Body Dressing Assistance  Dressing Assistance: Supervision  Underpants: Supervision; Compensatory technique training  Pants With Button/Zipper: Supervision; Compensatory technique training  Socks: Supervision; Compensatory technique training  Shoes with Cloth Laces: Supervision; Compensatory technique training  Position Performed: Seated in chair  Cues: Verbal cues provided    Cognitive Retraining  Problem Solving: Identifying the problem; Identifying the task;General alternative solution  Organizing/Sequencing: Breaking task down  Safety/Judgement: Awareness of environment; Fall prevention;Home safety    Patient instructed and educated on mindful movement principles based on Move in The Tube concept to include maintaining bilateral elbows close to rib cage when performing any load-bearing activity such as getting in/out of bed, pushing up from a chair, opening a door, or lifting a box. Patient was given a handout with diagrams of each correct/incorrect method of performing each of the above tasks. Patient instructed on the ability to utilize upper extremities outside the tube when doing any non-load bearing activity such as washing hair/body, brushing teeth, retrieving clothing items, or scratching your back. Patient encouraged to also perform upper extremity exercises \"outside of the tube\" to prevent scar tissue formation around sternal incision site. Patient instructed in detail about activities to heed with caution, allowing pain to be the guide. These activities include but are not limited to: mowing the lawn, riding a bike, walking a dog, lifting a child, workshop hobbies, golfing, sexual activity, vacuuming, fishing, scrubbing the floors, and moving furniture. Patient was given the [address] in the [place] handout to describe each of these activities in detail. Patient instructed no asymmetrical reaching over head to ensure B UEs when shoulders >90* i.e. reaching in cabinets and dressing. Instruction on upper body dressing techniques of over head, then arms through to decrease pain and unilateral shoulder flexion >90*. Instruction on the benefits of utilizing B UEs during functional tasks i.e. opening the fridge, stepping into the tub. Instruction if continued pain at home with shoulder IR for BM hygiene can use wet wipes and toilet tongs PRN. Avoid valsalva maneuvers.   May have to adjust home setup to increase ease with items closer to waist height to prevent deep bending and the automatic  of asymmetrical UE WB/pushing for stabilization during bending. Benefit to don clothing tailor sitting and don all clothing while sitting prior to standing. Patient demonstrated lower body dressing with Supervision. Instruction and indicated understanding on the benefits of loose clothing throughout to accommodate for post surgical swelling, decreased ROM and increased pain. Instruction and indicated understanding the technique of pull over shirt versus front open clothing. Increase activity tolerance for home, work, and sexual intercourse by pacing self with increasing the arm exercises, sitting duration, frequency OOB, walking, standing, and ADLs. Instructed and indicated understanding of s/s of too much activity, how to respond to s/s safely. Therapeutic Exercises:   Patient instructed on the benefits and demonstrated cardiac exercises. Instructed and indicated understanding on how to progress reps, sets against gravity, pacing through progressive muscle strengthening standing based on surgeon clearance for more weight in prep for basic and instrumental ADLs. Instruction on the use of household items in place of weights as needed. Pain:  Reports soreness around incision site. Activity Tolerance:   requires rest breaks    After treatment patient left in no apparent distress:   Sitting in chair, Call bell within reach, and Caregiver / family present    COMMUNICATION/COLLABORATION:   The patients plan of care was discussed with: Physical therapist and Registered nurse.      Juan Carmen OT  Time Calculation: 72 mins

## 2022-12-09 NOTE — PROGRESS NOTES
1900- Bedside shift change report given to Jose Daniel Zarate RN (oncoming nurse) by Nelda Sanchez RN (offgoing nurse). Report included the following information SBAR, Kardex, ED Summary, Intake/Output, MAR, Recent Results, Med Rec Status, and Cardiac Rhythm NSR .     0700- Cardiac Surgery End of Shift Report PCU    Vitals:  Patient Vitals for the past 4 hrs:   Temp Pulse Resp BP SpO2   12/09/22 0343 98.1 °F (36.7 °C) 87 19 (!) 112/51 97 %          EKG/Rhythm:   NSR                  External Pacemaker:  NO                  Pacer wires Capped?: YES    Oxygen therapy:   Oxygen Delivery:   room air    ISS Teaching yes   Use : YES     Volume: 1750      Lines & Drains:  none      Cardiac drips?: none      12 hour Output:     12 hour Chest Tube Output:    12 Hour Urine Output: 1100                                  Daily Weight:     Skin/Wounds:  Wound Hand Right (Active)   Number of days: 1457       Incision 08/26/21 Finger (Comment which one) Right (Active)   Number of days: 470       Incision 11/22/22 Chest (Active)   Dressing Status Clean;Dry; Intact 12/09/22 0343   Cleansed Soap and water 12/09/22 0343   Dressing/Treatment Other (Comment) 12/08/22 1714   Closure Surgical glue 12/08/22 1714   Margins Approximated 12/08/22 1714   Drainage Amount None 12/08/22 1714   Wound Odor None 12/08/22 1714   Gayle-Wound/Incision Assessment Intact 12/07/22 0311   Number of days: 17           Pain Control:     Activity:   Up in chair YES ; 3 times,  Duration 180 min         Ambulated YES; Distance 50 ft , 1 times    Hygiene: CHG bath YES SHOWER NO      Concerns/ Communication: none

## 2022-12-10 ENCOUNTER — TELEPHONE (OUTPATIENT)
Dept: ENDOCRINOLOGY | Age: 42
End: 2022-12-10

## 2022-12-10 NOTE — TELEPHONE ENCOUNTER
I received a page from Mr Jose Daniel, He was discharged from the hospital yesterday and he notes that he is not able to self administer his insulin and is confused about what to take. I called and spoke with pt and his mother who is with him to help administer his insulin. His mother notes his BG is currently 430. I instructed him to take Toujeo 140 units once per day (or he can take 70 units BID) and to take Novolin R 30 units with each meal plus 5:50 correction for BG >150. Since his BG right now is 430 I recommended he take a now dose of 30 units of Novolin R.    Pt to follow up with me in on 12/16/22 at 1230. Pt voices understanding and agreement with the plan.

## 2022-12-11 LAB
BACTERIA SPEC CULT: NORMAL
SERVICE CMNT-IMP: NORMAL

## 2022-12-12 ENCOUNTER — DOCUMENTATION ONLY (OUTPATIENT)
Dept: CASE MANAGEMENT | Age: 42
End: 2022-12-12

## 2022-12-12 NOTE — PROGRESS NOTES
Cardiac Surgery Discharge - Follow up call placed to Jeffery Perdomo. Reviewed plan of care after discharge and encouraged Jeffery Perdomo to verbalize. Discussed precautions and reviewed medications. Patient without questions regarding medications. Encouraged continued use of the incentive spirometer, daily temp and weight. He is showering and walking. Cleaning incisions twice daily. Confirmed follow up appts and reinforced importance of wearing red reminder bracelet. Jeffery Perdomo is without questions or concerns.  Danny Mota RN

## 2022-12-14 DIAGNOSIS — G62.9 NEUROPATHY: Primary | ICD-10-CM

## 2022-12-14 RX ORDER — PREGABALIN 50 MG/1
50 CAPSULE ORAL 3 TIMES DAILY
Qty: 21 CAPSULE | Refills: 0 | Status: SHIPPED | OUTPATIENT
Start: 2022-12-14 | End: 2022-12-21

## 2022-12-14 NOTE — PROGRESS NOTES
Pt called with continued complaints of numbness to arm with spasms in hand. Will start pt on pregabalin 50mg TID. Dose discussed with pharmacy d/t concerns of gabapentin allergy.

## 2022-12-15 NOTE — PROGRESS NOTES
Patient: Maikel Ruvalcaba   Age: 36 y.o. Patient Care Team:  Andrea Ku MD as PCP - General (Internal Medicine Physician)  Andrea Ku MD as PCP - [address] [place] [address] Provider  Seble Garrido MD (Neurology)  Tariq Rodríguez MD as Consulting Provider (Endocrinology Physician)  Yaima Chua MD (Cardiovascular Disease Physician)  Adalberto Burnette MD as Physician (Psychiatry)  Henrique Munoz MD (Cardiovascular Disease Physician)  Immanuel Juárez MD (Cardiothoracic Surgery)    Diagnosis: The encounter diagnosis was S/P AVR (aortic valve replacement) and aortoplasty. Problem List:   Patient Active Problem List   Diagnosis Code    DM (diabetes mellitus) ([facility] E11.9    Acquired hypothyroidism E03.9    Essential hypertension I10    Fibromyalgia M79.7    Microalbuminuria R80.9    Anxiety F41.9    Migraine without aura G43.009    Hypertriglyceridemia E78.1    Severe obesity ([facility]) E66.01    Transient ischemic attack involving left internal carotid artery G45. 1    Chest pain R07.9    Unstable angina ([facility]) I20.0    Coronary artery disease involving native coronary artery of native heart with unstable angina pectoris ([facility]) I25.110    Type 2 diabetes with nephropathy ([facility]) E11.21    Dysthymia F34.1    Adjustment disorder with mixed emotional features F43.29    Anxiety disorder due to general medical condition with panic attack F06.4, F41.0    Insomnia disorder with non-sleep disorder mental comorbidity G47.00    Dizzy spells R42    Multiple lacunar infarcts ([facility]) I63.81    Cervical spondylosis with radiculopathy M47.22    Low back pain M54.50    Corneal abrasion, right S05. 01XA    Stable proliferative diabetic retinopathy of both eyes associated with type 1 diabetes mellitus ([facility] G28.8331    Bilateral carotid artery stenosis I65.23    Acute non-Q wave non-ST elevation myocardial infarction (NSTEMI) ([facility]) I21.4    CAD (coronary artery disease) I25.10    S/P CABG x 1 Z95.1    S/P AVR (aortic valve replacement) and aortoplasty Z95.2          Date of Surgery: 11/22/22     Surgery: CORONARY ARTERY BYPASS GRAFT  X 1 WITH LEFT INTERNAL MAMMARY ARTERY to LAD  AORTIC VALVE REPLACEMENT WITH MEYER 25MM INSPIRIS RESILIA TISSUE VALVE, REPAIR OF ASCENDING AORTIC ANEURYSM with 26mm hemashield graft    HPI:  Pt is here for post op follow up. Pt had a CABG x 1, AVR with Meyer 25mm Inspiris valve, repair of ascending aortic aneurysm on 11/22/22. Post-op course as follows: Pt was transferred to the ICU in stable condition on Epinephrine, Neosynephrine, Precedex, and Insulin gtts. Pt was extubated night of surgery. POD 1 pt with fluid overload, worsening crt, transaminitis. That evening pt became hypoxic requiring Bipap and was ultimately re-intubated at 0200 for acute respiratory failure. Bronchoscopy was completed and was unremarkable. CXR  SYLWIA was completed this am with normal RV and LVF without effusion or shunting. Initial rapid COVID was positive, subsequently 2 negative PCRs. All cultures and viral panels negative. CTA chest neg. Liver slowly began to recover and return to baseline. Pt was extubated second time on POD# 9 to hi-flow. Oxygen was weaned and removed finally on POD # 14. Endurance was a lee postop, he struggled with SOB/COLE. Pt was working hard on his mobility and was found safe to discharge home with 24/7 assistance and in home therapy. Pt discharged home 12/9/22. Overall \"I haven't felt very well- right leg is numb from hip to right knee, left arm having \"pounding and shooting pain\" down to the hand (resolved) and now on the right hand from ring finger to the the thumb has a pins and needle sensation, and left thumb is \"wanting to curl in\", trying hot water, massage and rubbing his arm. Had to \"detox\" from all pain meds (last pain pill last Friday- got disoriented in the bathroom- only took one dose at home before he decided to do this). Pain meds too strong. Tylenol doesn't give relief.  Has only taken sporadic doses. He was prescribed Lyrica but hasn't picked it up yet. Cold all the time, but no fevers or chills. (-) Syncope/near syncope but will get lightheaded when he stands. (-) CP,   (-) SOB,  (+)  NICO: \"yesterday my feet were a little swollen\"  (-) PND,   (-) orthopnea,   (-) fever,   (-) chills,   (-) N/V/D. (-) redness, drainage or swelling   He thinks he heard popping earlier when raising arm, but it may have been his shoulder (and sternum is stable on exam- encouraged to notify office immediately if he notices popping or clicking of sternum). Appetite: Normal.   Bowel and bladder function: normal. Urine is clear yellow. Has been checking Bps: Doesn't have his log with him but pulse \"has been running a little high 101-103\". No palpitations. Has been checking weights: No weight gain. Has been checking Blood sugar: Running 100s-200s. Working with Dr. Sandip Zelaya today. Walking: Is walking \"a good amount but inside\"  Date for cardiac rehab? Starts 12/29. Current Medications:   Current Outpatient Medications   Medication Sig Dispense Refill    insulin regular (NOVOLIN R, HUMULIN R) 100 unit/mL injection 40 units with each meal, plus correction. Max daily dose of 150 units. 20 mL 5    levothyroxine (SYNTHROID) 125 mcg tablet Take 1 Tablet by mouth Daily (before breakfast). 90 Tablet 3    furosemide (LASIX) 40 mg tablet Take in the morning when you wake up 7 Tablet 0    potassium chloride SR (K-TAB) 20 mEq tablet Take 2 Tablets by mouth daily for 7 days. 14 Tablet 0    metFORMIN (GLUCOPHAGE) 500 mg tablet TAKE 1 TABLET BY MOUTH TWICE DAILY WITH MEALS 180 Tablet 3    insulin glargine U-300 conc (Toujeo Max U-300 SoloStar) 300 unit/mL (3 mL) inpn Take 140 units every day (new dosage) 30 mL 5    LORazepam (ATIVAN) 1 mg tablet Take 1 Tablet by mouth every eight (8) hours as needed for Anxiety.  Max Daily Amount: 3 mg. 90 Tablet 2    sertraline (ZOLOFT) 100 mg tablet Take 1 Tablet by mouth daily. 30 Tablet 2    sertraline (ZOLOFT) 50 mg tablet Take 1 Tablet by mouth daily. 30 Tablet 2    alum-mag hydroxide-simeth (MYLANTA) 200-200-20 mg/5 mL susp Take 30 mL by mouth every four (4) hours as needed for Indigestion. 150 mL 0    busPIRone (BUSPAR) 15 mg tablet Take 1 Tablet by mouth three (3) times daily. 90 Tablet 2    Insulin Needles, Disposable, 32 gauge x 5/32\" ndle 5 shots per day (dispense whatever brand is covered by his insurance) 500 Pen Needle 3    insulin U-500 syringe-needle (BD Insulin Syringe U-500) 1/2 mL 31 gauge x 15/64\" syrg Three shots per day. 300 Syringe 3    butalbital-acetaminophen-caffeine (FIORICET, ESGIC) -40 mg per tablet Take 1 Tablet by mouth every six (6) hours as needed for Headache. Indications: a migraine headache 10 Tablet 0    esomeprazole (NEXIUM) 40 mg capsule TAKE 1 CAPSULE BY MOUTH ONCE DAILY BEFORE BREAKFAST DO NOT OPEN CAPSULE      metoprolol succinate (TOPROL-XL) 25 mg XL tablet Take 25 mg by mouth two (2) times a day. rosuvastatin (CRESTOR) 40 mg tablet Take 40 mg by mouth daily. famotidine (PEPCID) 40 mg tablet Take 1 Tablet by mouth daily. 30 Tablet 3    clopidogreL (PLAVIX) 75 mg tab Take 1 Tablet by mouth daily. 30 Tablet 12    cholecalciferol (Vitamin D3) (5000 Units/125 mcg) tab tablet Take 1 Tab by mouth daily. 90 Tab 1    Insulin Syringe-Needle U-100 (BD Insulin Syringe) 1 mL 25 x 1\" syrg Use for drawing up/injecting testosterone once weekly. 100 Each 3    aspirin delayed-release 81 mg tablet Take 1 Tab by mouth daily. 30 Tab 0    pregabalin (LYRICA) 50 mg capsule Take 1 Capsule by mouth three (3) times daily for 7 days. Max Daily Amount: 150 mg. (Patient not taking: Reported on 12/16/2022) 21 Capsule 0    oxyCODONE IR (ROXICODONE) 5 mg immediate release tablet Take 1-2 Tablets by mouth every eight (8) hours as needed for Pain for up to 10 days. Max Daily Amount: 30 mg.  (Patient not taking: Reported on 12/16/2022) 10 Tablet 0       Vitals: Blood pressure 130/64, pulse 96, temperature 98.3 °F (36.8 °C), temperature source Oral, resp. rate 20, height 5' 9\" (1.753 m), weight 244 lb 12.8 oz (111 kg), SpO2 99 %. Allergies: is allergic to gabapentin, morphine, penicillin g, percocet [oxycodone-acetaminophen], sulfa (sulfonamide antibiotics), and tramadol. Physical Exam:  General: Awake, alert, oriented  and no acute distress   Lungs:    Clear to auscultation bilaterally   Incision:  Incision clean, dry and intact and no erythema, drainage or swelling. Sternum stable. Heart:   Regular rate and rhythm  and no murmurs, rubs or gallops   Abdomen:    Soft, non-distended, non-tender, active bowel sounds, and obese   Extremities:   1+ pitting edema BLE    Neurologic:  Gross motor and sensory apparatus intact       Assessment/Plan:   Severe symptomatic AS, s/p tissue AVR. Sternotomy is healing. Advised continued diligence with showering daily, twice daily incisional care, increased activity and \"move in the tube\". Starts cardiac rehab soon. Continue ASA. CAD, STEMI, s/p CABG: Repeat Echo without effusion or tamponade, NL LV/RVF. Continue ASA, statin, and beta blocker. Acute hypoxic respiratory failure, RA  SERGE, resolved Crt 0.86 as of 12/5/22. Cont Lasix 40 mg daily PO  Acute blood loss anemia: improving, H&H up to 9.4/30.3 as of 12/5/22. Transaminitis. Acute Severe Hepatitis, GI consult appreciated, + hepatomegaly and splenomegaly. Viral panel neg. Triple phase liver scan completed 11/30. No hepatic or splenic ischemia noted. + hepatic steatosis. Labs cont to trend down. No amiodarone. Statin restarted. Leukocytosis: resolved. BC, Sputum and urine cultures NGTD. Completed Meropenem. Rapid COVID pos, 2 PCR negative. WBC 10.3 as of 12/5/22. Afebrile. HTN: metoprolol. Will need to restart lisinopril when BP is more robust and creatinine normal. Preop creatinine was 0.97. HLD: statin restarted. Check LFTs in a month.    WALLY , not on CPAP. Was unable to tolerate due to anxiety; he has been working on this with Sleep Medicine. OP follow-up. DMII. On Toujeo at home. Repeat A1C 9.0. Sees Dr. Ruddy Romo today. GERD. Continue PPI. Anxiety and depression. On Buspar, Zoloft; continue. Supportive care. Ileus: resolved. + BMs. Reduce bowel regimen. Deconditioning/mobility, improving : Starts cardiac rehab soon. Post-operative pain control: discomfort he is experiencing sounds like neuropathic pain. Multiple pain medication allergies. Pt advised to give the Lyrica he was prescribed a try. He was also advised that he can take around the clock Tylenol, but that he needs to be especially mindful of his daily dose. He has lidocaine patches at home that he is willing to try as well. Pt is ready to start cardiac rehab.      Time spent between chart review and with patient: 45 minutes

## 2022-12-16 ENCOUNTER — OFFICE VISIT (OUTPATIENT)
Dept: CARDIOTHORACIC SURGERY | Age: 42
End: 2022-12-16
Payer: COMMERCIAL

## 2022-12-16 ENCOUNTER — OFFICE VISIT (OUTPATIENT)
Dept: ENDOCRINOLOGY | Age: 42
End: 2022-12-16

## 2022-12-16 VITALS
TEMPERATURE: 98.3 F | RESPIRATION RATE: 20 BRPM | OXYGEN SATURATION: 99 % | WEIGHT: 244.8 LBS | BODY MASS INDEX: 36.26 KG/M2 | HEART RATE: 96 BPM | SYSTOLIC BLOOD PRESSURE: 130 MMHG | HEIGHT: 69 IN | DIASTOLIC BLOOD PRESSURE: 64 MMHG

## 2022-12-16 VITALS
WEIGHT: 240.6 LBS | HEART RATE: 95 BPM | DIASTOLIC BLOOD PRESSURE: 67 MMHG | HEIGHT: 69 IN | SYSTOLIC BLOOD PRESSURE: 126 MMHG | BODY MASS INDEX: 35.63 KG/M2

## 2022-12-16 DIAGNOSIS — E03.9 ACQUIRED HYPOTHYROIDISM: ICD-10-CM

## 2022-12-16 DIAGNOSIS — E29.1 HYPOGONADISM IN MALE: ICD-10-CM

## 2022-12-16 DIAGNOSIS — E10.42 TYPE 1 DIABETES MELLITUS WITH DIABETIC POLYNEUROPATHY (HCC): Primary | ICD-10-CM

## 2022-12-16 DIAGNOSIS — Z95.2 S/P AVR (AORTIC VALVE REPLACEMENT) AND AORTOPLASTY: Primary | ICD-10-CM

## 2022-12-16 DIAGNOSIS — E78.2 MIXED HYPERLIPIDEMIA: ICD-10-CM

## 2022-12-16 RX ORDER — BLOOD-GLUCOSE SENSOR
EACH MISCELLANEOUS
Qty: 2 EACH | Refills: 5 | Status: SHIPPED | OUTPATIENT
Start: 2022-12-16

## 2022-12-16 NOTE — PATIENT INSTRUCTIONS
1) Toujeo 75 units in the morning and 75 units at bedtime. 2) Novolin R: 35 units with each meal, plus correction.     150-199 5 additional units  200-249 10 additional units  250-299 15 additional units  300-349 20 additional units  350-399 25 additional units  400-449 30 additional units  450-499 35 additional units  500-549 40 additional units  550+ 45 additional units

## 2022-12-16 NOTE — LETTER
12/16/2022    Patient: Jet Salgado   YOB: 1980   Date of Visit: 12/16/2022     Rebeca Garcia MD  [address]  [facility] [address]  [address] [address]  [place]    Dear Rebeca Garcia MD,      Thank you for referring Mr. Otilio Sanchez to [address] [place] [address] for evaluation. My notes for this consultation are attached. If you have questions, please do not hesitate to call me. I look forward to following your patient along with you.       Sincerely,    Farhan Hendrickson MD

## 2022-12-16 NOTE — PROGRESS NOTES
Chief Complaint   Patient presents with    Coronary Artery Disease     1 week f/u     1. Have you been to the ER, urgent care clinic since your last visit? Hospitalized since your last visit? No    2. Have you seen or consulted any other health care providers outside of the [address] [place] [address] since your last visit? Include any pap smears or colon screening.  No

## 2022-12-16 NOTE — PROGRESS NOTES
Chief Complaint   Patient presents with    Diabetes     History of Present Illness: Renetta Bryant is a 36 y.o. male here for follow up of diabetes. 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 Magalys Fischer of Cardiology. Pt has been experiencing more issues of CP and presented to the ED on 11/15/22 and was found to have multi-vessel disease. He was taken to the OR for CORONARY ARTERY BYPASS GRAFT  X 1 WITH LEFT INTERNAL MAMMARY ARTERY to LAD AORTIC VALVE REPLACEMENT WITH MEYER 25MM INSPIRIS RESILIA TISSUE VALVE   REPAIR OF ASCENDING AORTIC ANEURYSM with 26mm hemashield graft. He was in the ICU on a vent for several days and was eventually discharged home on 10/9/22. He called me 10/10/22 and he noted that his BGs were running in the 300-400s. I instructed him to take Toujeo 140 units once per day (or he can take 70 units BID) and to take Novolin R 30 units with each meal plus 5:50 correction for BG >150. Pt notes he has been experiencing issues of lightheadedness. \"It has been my BP, it started acting up after my hospitalization. They told me to take my time when I stand up but it has not been getting better. \"  He has been testing his BPs and notes that his pressures have been running in the 110s-120s/60s at home. His BP today was 126/67    Pt notes that after the surgery his left arm and right leg have been numb. He saw the surgeon today and \"she said my chest looks good and my BPs was looking good. She did started me on Lyrica. \"    For his issues of vertigo, and dizziness he is followed by Dr. Kandice Hagen of Neurology. \"I need to see him in follow up. \"    Pt is taking Toujeo 70 units in the AM and 70 units HS and Novolin R 30 units with each meal, plus correction. 5:50 for BG >50. Pt is testing his BGs 4 times per day. His BGs are improving to the 180-200s range with some 300s.   He has not had any issues of hypoglycemia. Pt and his mother note that he has been adjusting his diet. He is eating more vegetable, fruit and salads. He has lost 20 pounds. Pt is still following with Dr. Giorgio Pope of psychiatry for anxiety and depression. Pt has hx of neuropathy. No hx of nephropathy. Pt has hx of hypothyroidism, since the age of 11-9 years old. He is currently taking 112mcg daily. At our visit in October 2019 we tested his Thyroid labs, which were normal, but he had a very low Testosterone of 199 on 10/22/19 and repeat was 240 in 10/28/19. His FSH, LH and prolactin were unremarkable. Pt opted to  Not start Clomid 50mg every other day. At our last visit we again ordered the clomid. Pt notes that he has been taking the Clomid 50mg every other day. He notes he is still having issues of ED and poor libido so he stopped taking the Clomid because of the cost.  He wanted to discuss trying an alternative agent. He was taken off the the testosterone. He is not currently taking the topical testosterone. \"I am using it once in a blood moon. I have not seen any benefit from it. \"  When he was using two pumps per day. Pt notes he was changed from CPAP to BiPap, but he had a panic attack and the BiPap made it worse \"so I have not used it since. \"    Current Outpatient Medications   Medication Sig    insulin regular (NOVOLIN R, HUMULIN R) 100 unit/mL injection 40 units with each meal, plus correction. Max daily dose of 150 units. (Patient taking differently: 30 units with each meal, plus correction. Max daily dose of 150 units.)    levothyroxine (SYNTHROID) 125 mcg tablet Take 1 Tablet by mouth Daily (before breakfast).     metFORMIN (GLUCOPHAGE) 500 mg tablet TAKE 1 TABLET BY MOUTH TWICE DAILY WITH MEALS    insulin glargine U-300 conc (Toujeo Max U-300 SoloStar) 300 unit/mL (3 mL) inpn Take 140 units every day (new dosage)    LORazepam (ATIVAN) 1 mg tablet Take 1 Tablet by mouth every eight (8) hours as needed for Anxiety. Max Daily Amount: 3 mg.    sertraline (ZOLOFT) 100 mg tablet Take 1 Tablet by mouth daily. sertraline (ZOLOFT) 50 mg tablet Take 1 Tablet by mouth daily. alum-mag hydroxide-simeth (MYLANTA) 200-200-20 mg/5 mL susp Take 30 mL by mouth every four (4) hours as needed for Indigestion. busPIRone (BUSPAR) 15 mg tablet Take 1 Tablet by mouth three (3) times daily. Insulin Needles, Disposable, 32 gauge x 5/32\" ndle 5 shots per day (dispense whatever brand is covered by his insurance)    butalbital-acetaminophen-caffeine (FIORICET, ESGIC) -40 mg per tablet Take 1 Tablet by mouth every six (6) hours as needed for Headache. Indications: a migraine headache    esomeprazole (NEXIUM) 40 mg capsule TAKE 1 CAPSULE BY MOUTH ONCE DAILY BEFORE BREAKFAST DO NOT OPEN CAPSULE    metoprolol succinate (TOPROL-XL) 25 mg XL tablet Take 25 mg by mouth two (2) times a day. rosuvastatin (CRESTOR) 40 mg tablet Take 40 mg by mouth daily. famotidine (PEPCID) 40 mg tablet Take 1 Tablet by mouth daily. clopidogreL (PLAVIX) 75 mg tab Take 1 Tablet by mouth daily. cholecalciferol (Vitamin D3) (5000 Units/125 mcg) tab tablet Take 1 Tab by mouth daily. Insulin Syringe-Needle U-100 (BD Insulin Syringe) 1 mL 25 x 1\" syrg Use for drawing up/injecting testosterone once weekly. aspirin delayed-release 81 mg tablet Take 1 Tab by mouth daily. pregabalin (LYRICA) 50 mg capsule Take 1 Capsule by mouth three (3) times daily for 7 days. Max Daily Amount: 150 mg. (Patient not taking: No sig reported)    furosemide (LASIX) 40 mg tablet Take in the morning when you wake up (Patient not taking: Reported on 12/16/2022)    oxyCODONE IR (ROXICODONE) 5 mg immediate release tablet Take 1-2 Tablets by mouth every eight (8) hours as needed for Pain for up to 10 days. Max Daily Amount: 30 mg. (Patient not taking: No sig reported)    potassium chloride SR (K-TAB) 20 mEq tablet Take 2 Tablets by mouth daily for 7 days. (Patient not taking: Reported on 12/16/2022)    insulin U-500 syringe-needle (BD Insulin Syringe U-500) 1/2 mL 31 gauge x 15/64\" syrg Three shots per day. (Patient not taking: Reported on 12/16/2022)     No current facility-administered medications for this visit. Allergies   Allergen Reactions    Gabapentin Swelling     Of feet    Morphine Nausea and Vomiting    Penicillin G Swelling    Percocet [Oxycodone-Acetaminophen] Hives and Nausea and Vomiting     Pt is allergic to Oxycodone, has previously tolerated Tylenol and Hydrocodone. Sulfa (Sulfonamide Antibiotics) Swelling    Tramadol Nausea Only     Nausea and headache       Review of Systems:  - Eyes: no blurry vision or double vision  - Cardiovascular: no chest pain  - Respiratory: no shortness of breath  - Musculoskeletal: no myalgias  - Neurological: no numbness/tingling in extremities    Physical Examination:  Blood pressure 126/67, pulse 95, height 5' 9\" (1.753 m), weight 240 lb 9.6 oz (109.1 kg).   General: pleasant, no distress, good eye contact   Neck: no carotid bruits  Cardiovascular: regular, normal rate, nl s1 and s2, no m/r/g, 2+ DP pulses   Respiratory: clear bilaterally  Integumentary: no edema, no foot ulcers,   Psychiatric: normal mood and affect    Diabetic foot exam:     Left Foot:   Visual Exam: callous - present   Pulse DP: 2+ (normal)   Filament test: normal sensation    Vibratory sensation: normal      Right Foot:   Visual Exam: callous - present   Pulse DP: 2+ (normal)   Filament test: normal sensation    Vibratory sensation: normal        Data Reviewed:   Component      Latest Ref Rng & Units 12/5/2022 11/28/2022 11/27/2022 11/16/2022   Sodium      136 - 145 mmol/L 141      Potassium      3.5 - 5.1 mmol/L 3.7      Chloride      97 - 108 mmol/L 102      CO2      21 - 32 mmol/L 33 (H)      Anion gap      5 - 15 mmol/L 6      Glucose      65 - 100 mg/dL 118 (H)      BUN      6 - 20 MG/DL 21 (H)      Creatinine      0.70 - 1.30 MG/DL 0.86      BUN/Creatinine ratio      12 - 20   24 (H)      eGFR      >60 ml/min/1.73m2 >60      Calcium      8.5 - 10.1 MG/DL 8.3 (L)      Cholesterol, total      <200 MG/DL    148   Triglyceride      <150 MG/DL    346 (H)   HDL Cholesterol      MG/DL    33   LDL, calculated      0 - 100 MG/DL    45.8   VLDL, calculated      MG/DL    69.2   CHOL/HDL Ratio      0.0 - 5.0      4.5   TSH      0.36 - 3.74 uIU/mL  6.70 (H)  1.96   Cortisol, random      ug/dL   23.3    T4, Free      0.8 - 1.5 NG/DL  0.9         Assessment/Plan:   1) DM > Will have pt increase his Toujoe to 75 units BID and his Novolin R to 35 units TID/AC plus 5:50 correction for BG >150. Pt to check his BGs 4 times per day and mail his BG logs to me in 4 weeks. Because he is taking 5 dose of insulin per day and adjusting his insulin doses based on his BG readings he would benefit from a CGM. He was not able to tolerate the Mark Keys 2 so I ordered the Mark Keys 3 CGM. 2) Hypothyroidism > Pt is clinically euthyroid on LT4 112mcg daily. His TSH prior to the surgery was 1.96 his FT4 of 0.9. Pt to continue the LT4 112mcg daily. 3) Hypogonadism > Will have pt stay off the Testosterone till he is cleared by his Cardiologist.      4) HLD > Pt to continue the Rosuvastatin 40mg daily. His lipid panel in the hospital was at goal.      Pt voices understanding and agreement with the plan.   Pain noted and pt was recommended to call his PCP for further evaluation and treatment, as needed    RTC 6 weeks      Copy sent to:  Dr. Martinez Begum

## 2022-12-18 LAB
BACTERIA SPEC CULT: NORMAL
SERVICE CMNT-IMP: NORMAL

## 2022-12-20 NOTE — CARDIO/PULMONARY
Chart broken down. Pt has not returned since 8/10/22; has new intake appointment scheduled on 12/20/22.

## 2022-12-21 NOTE — PROGRESS NOTES
Patient: Tyson Garcia   Age: 36 y.o. Patient Care Team:  Roberth Giron MD as PCP - General (Internal Medicine Physician)  Roberth Giron MD as PCP - [facility]  Rizwan Taylor MD (Neurology)  Elke Kellogg MD as Consulting Provider (Endocrinology Physician)  Karena Slater MD (Cardiovascular Disease Physician)  Brea Ramirez MD as Physician (Psychiatry)  Brian Gamble MD (Cardiovascular Disease Physician)  Deann Moreland MD (Cardiothoracic Surgery)    Diagnosis: The encounter diagnosis was S/P AVR Tissue (aortic valve replacement) and aortoplasty. CABX1, repair of ascending aortic aneurysm. Problem List:   Patient Active Problem List   Diagnosis Code    DM (diabetes mellitus) ([facility] [address] E11.9    Acquired hypothyroidism E03.9    Essential hypertension I10    Fibromyalgia M79.7    Microalbuminuria R80.9    Anxiety F41.9    Migraine without aura G43.009    Hypertriglyceridemia E78.1    Severe obesity ([facility]) E66.01    Transient ischemic attack involving left internal carotid artery G45. 1    Chest pain R07.9    Unstable angina ([facility]) I20.0    Coronary artery disease involving native coronary artery of native heart with unstable angina pectoris ([facility]) I25.110    Type 2 diabetes with nephropathy ([facility]) E11.21    Dysthymia F34.1    Adjustment disorder with mixed emotional features F43.29    Anxiety disorder due to general medical condition with panic attack F06.4, F41.0    Insomnia disorder with non-sleep disorder mental comorbidity G47.00    Dizzy spells R42    Multiple lacunar infarcts ([facility]) I63.81    Cervical spondylosis with radiculopathy M47.22    Low back pain M54.50    Corneal abrasion, right S05. 01XA    Stable proliferative diabetic retinopathy of both eyes associated with type 1 diabetes mellitus ([facility] [address] D55.8983    Bilateral carotid artery stenosis I65.23    Acute non-Q wave non-ST elevation myocardial infarction (NSTEMI) ([facility]) I21.4    CAD (coronary artery disease) I25.10    S/P CABG x 1 Z95.1    S/P AVR (aortic valve replacement) and aortoplasty Z95.2          Date of Surgery: 11/22/22      Surgery: 11/22/22     HPI:    Pt is here for post op follow up. Pt had a CABG x 1, AVR with Rodas 25mm Inspiris valve, repair of ascending aortic aneurysm on 11/22/22. Post-op course as follows: Pt was transferred to the ICU in stable condition on Epinephrine, Neosynephrine, Precedex, and Insulin gtts. Pt was extubated night of surgery. POD 1 pt with fluid overload, worsening crt, transaminitis. That evening pt became hypoxic requiring Bipap and was ultimately re-intubated at 0200 for acute respiratory failure. Bronchoscopy was completed and was unremarkable. CXR  SYLWIA was completed this am with normal RV and LVF without effusion or shunting. Initial rapid COVID was positive, subsequently 2 negative PCRs. All cultures and viral panels negative. CTA chest neg. Liver slowly began to recover and return to baseline. Pt was extubated second time on POD# 9 to hi-flow. Oxygen was weaned and removed finally on POD # 14. Endurance was a lee postop, he struggled with SOB/COLE. Pt was working hard on his mobility and was found safe to discharge home with 24/7 assistance and in home therapy. Pt discharged home 12/9/22.      12/16/22:    Overall \"I haven't felt very well- right leg is numb from hip to right knee, left arm having \"pounding and shooting pain\" down to the hand (resolved) and now on the right hand from ring finger to the the thumb has a pins and needle sensation, and left thumb is \"wanting to curl in\", trying hot water, massage and rubbing his arm. Had to \"detox\" from all pain meds (last pain pill last Friday- got disoriented in the bathroom- only took one dose at home before he decided to do this). Pain meds too strong. Tylenol doesn't give relief. Has only taken sporadic doses. He was prescribed Lyrica but hasn't picked it up yet. Cold all the time, but no fevers or chills. (-) Syncope/near syncope but will get lightheaded when he stands. (-) CP,   (-) SOB,  (+)  NICO: \"yesterday my feet were a little swollen\"  (-) PND,   (-) orthopnea,   (-) fever,   (-) chills,   (-) N/V/D. (-) redness, drainage or swelling   He thinks he heard popping earlier when raising arm, but it may have been his shoulder (and sternum is stable on exam- encouraged to notify office immediately if he notices popping or clicking of sternum). Appetite: Normal.   Bowel and bladder function: normal. Urine is clear yellow. Has been checking Bps: Doesn't have his log with him but pulse \"has been running a little high 101-103\". No palpitations. Has been checking weights: No weight gain. Has been checking Blood sugar: Running 100s-200s. Working with Dr. Aramis Escobar today. Walking: Is walking \"a good amount but inside\"  Date for cardiac rehab? Starts 12/29.     12/22/22:   Overall is doing fair. No fevers, chills. Pain present especially when he gets in and out of bed. Mostly left upper chest and under right breast. Not using Lyrica. He is using Tylenol. He still gets dizzy when standing up. He denies bowel problems. He admits to a few episodes of epistaxis. No other bleeding. He gets swelling in his feet sometimes. He is doing his exercises. He can raise his arms above his head.      +  Dizziness. (-) CP,   (-) SOB,  (+)  NICO,   (-) PND,   (-) orthopnea,   (-) fever,   (-) chills,   (-) N/V/D.   (-) popping or clicking of sternum. (-) redness, drainage or swelling     Appetite no problem. Bowel and bladder function normal.     Has been checking Bps some as high as 170 per his mother who is with him. Has been checking weights between 238-241 . Has been checking Blood sugar. Did not bring his log. Walking: Is walking around the house several times a day. Has not walked outside. Date for cardiac rehab? Next week.  12/29/22    Current Medications:   Current Outpatient Medications   Medication Sig Dispense Refill    Blood-Glucose Sensor (FreeStyle Mikal 3 Sensor) cole Change sensor every 14 days 2 Each 5    pregabalin (LYRICA) 50 mg capsule Take 1 Capsule by mouth three (3) times daily for 7 days. Max Daily Amount: 150 mg. (Patient not taking: No sig reported) 21 Capsule 0    insulin regular (NOVOLIN R, HUMULIN R) 100 unit/mL injection 40 units with each meal, plus correction. Max daily dose of 150 units. (Patient taking differently: 30 units with each meal, plus correction. Max daily dose of 150 units.) 20 mL 5    levothyroxine (SYNTHROID) 125 mcg tablet Take 1 Tablet by mouth Daily (before breakfast). 90 Tablet 3    furosemide (LASIX) 40 mg tablet Take in the morning when you wake up (Patient not taking: Reported on 12/16/2022) 7 Tablet 0    metFORMIN (GLUCOPHAGE) 500 mg tablet TAKE 1 TABLET BY MOUTH TWICE DAILY WITH MEALS 180 Tablet 3    insulin glargine U-300 conc (Toujeo Max U-300 SoloStar) 300 unit/mL (3 mL) inpn Take 140 units every day (new dosage) 30 mL 5    LORazepam (ATIVAN) 1 mg tablet Take 1 Tablet by mouth every eight (8) hours as needed for Anxiety. Max Daily Amount: 3 mg. 90 Tablet 2    sertraline (ZOLOFT) 100 mg tablet Take 1 Tablet by mouth daily. 30 Tablet 2    sertraline (ZOLOFT) 50 mg tablet Take 1 Tablet by mouth daily. 30 Tablet 2    alum-mag hydroxide-simeth (MYLANTA) 200-200-20 mg/5 mL susp Take 30 mL by mouth every four (4) hours as needed for Indigestion. 150 mL 0    busPIRone (BUSPAR) 15 mg tablet Take 1 Tablet by mouth three (3) times daily. 90 Tablet 2    Insulin Needles, Disposable, 32 gauge x 5/32\" ndle 5 shots per day (dispense whatever brand is covered by his insurance) 500 Pen Needle 3    insulin U-500 syringe-needle (BD Insulin Syringe U-500) 1/2 mL 31 gauge x 15/64\" syrg Three shots per day.  (Patient not taking: Reported on 12/16/2022) 300 Syringe 3    butalbital-acetaminophen-caffeine (FIORICET, ESGIC) -40 mg per tablet Take 1 Tablet by mouth every six (6) hours as needed for Headache. Indications: a migraine headache 10 Tablet 0    esomeprazole (NEXIUM) 40 mg capsule TAKE 1 CAPSULE BY MOUTH ONCE DAILY BEFORE BREAKFAST DO NOT OPEN CAPSULE      metoprolol succinate (TOPROL-XL) 25 mg XL tablet Take 25 mg by mouth two (2) times a day. rosuvastatin (CRESTOR) 40 mg tablet Take 40 mg by mouth daily. famotidine (PEPCID) 40 mg tablet Take 1 Tablet by mouth daily. 30 Tablet 3    clopidogreL (PLAVIX) 75 mg tab Take 1 Tablet by mouth daily. 30 Tablet 12    cholecalciferol (Vitamin D3) (5000 Units/125 mcg) tab tablet Take 1 Tab by mouth daily. 90 Tab 1    Insulin Syringe-Needle U-100 (BD Insulin Syringe) 1 mL 25 x 1\" syrg Use for drawing up/injecting testosterone once weekly. 100 Each 3    aspirin delayed-release 81 mg tablet Take 1 Tab by mouth daily. 30 Tab 0       Vitals: There were no vitals taken for this visit. Allergies: is allergic to gabapentin, morphine, penicillin g, percocet [oxycodone-acetaminophen], sulfa (sulfonamide antibiotics), and tramadol. Physical Exam:  General: Awake, alert, oriented    Lungs:    Clear to auscultation bilaterally   Incision:  Incision clean, dry and intact and tiny area of crusty scab mid sternal site. Heart:   Regular rate and rhythm    Abdomen:     Normally distended due to habitus. Soft. Non tender   Extremities:  Non-pitting edema BLE   Neurologic:  Gross motor and sensory apparatus intact         Assessment/Plan:   Severe symptomatic AS, s/p tissue AVR . Sternotomy is healing. Continue aspirin. CAD, STEMI, s/p CABG: Repeat Echo without effusion or tamponade, NL LV/RVF. Continue ASA, statin, and beta blocker. Follow up with cardiology. Post-operative pain control: discomfort he is experiencing sounds like neuropathic pain. He was assured that the pain will reside over time. Continue Tylenol. Try the Lyrica. Continue exercises and warm showers which seem to help. HTN: metoprolol.   Will need to restart lisinopril when BP is more robust and creatinine normal.  Preop creatinine was 0.97. He will follow up with his cardiologist who will add his lisinopril. HLD: rosuvastatin. Check LFTs today. He has steatosis. WALLY , not on CPAP. Was unable to tolerate due to anxiety; he has been working on this with Sleep Medicine. OP follow-up. Fatty liver dz. On rosuvastatin. Check LFTs today. OP follow up. DMII. On Toujeo at home. Repeat A1C 9.0. He has followed up with endocrine. He will continue to see them. He is on insulin, metformin, Toujeo, glipizide. GERD. Continue PPI. Anxiety and depression. On Buspar, Zoloft. Although he has many complaints, his mood is better than the last encounter I had with him. Deconditioning/mobility, improving : Starts cardiac rehab soon. Pt is ready to start cardiac rehab. Rehab - 12/29  Walking: pt encouraged to walk outside when the weather is good. He is to get up every hour and walk around the house meanwhile. Glucometer: yes. Antibiotic card for valves: doesn't have yet. Instructed patient to have abx prophylaxis for the next year with procedures such as teeth cleaning, endoscopies, invasive procedures.

## 2022-12-22 ENCOUNTER — OFFICE VISIT (OUTPATIENT)
Dept: CARDIOTHORACIC SURGERY | Age: 42
End: 2022-12-22
Payer: COMMERCIAL

## 2022-12-22 DIAGNOSIS — Z95.2 S/P AVR (AORTIC VALVE REPLACEMENT) AND AORTOPLASTY: Primary | ICD-10-CM

## 2022-12-22 PROCEDURE — 99024 POSTOP FOLLOW-UP VISIT: CPT | Performed by: THORACIC SURGERY (CARDIOTHORACIC VASCULAR SURGERY)

## 2022-12-24 LAB
ALBUMIN SERPL-MCNC: 4 G/DL (ref 4–5)
ALP SERPL-CCNC: 159 IU/L (ref 44–121)
ALT SERPL-CCNC: 61 IU/L (ref 0–44)
AST SERPL-CCNC: 28 IU/L (ref 0–40)
BILIRUB DIRECT SERPL-MCNC: 0.14 MG/DL (ref 0–0.4)
BILIRUB SERPL-MCNC: 0.3 MG/DL (ref 0–1.2)
PROT SERPL-MCNC: 6.4 G/DL (ref 6–8.5)

## 2022-12-29 ENCOUNTER — PATIENT MESSAGE (OUTPATIENT)
Dept: INTERNAL MEDICINE CLINIC | Age: 42
End: 2022-12-29

## 2022-12-29 ENCOUNTER — APPOINTMENT (OUTPATIENT)
Dept: CARDIAC REHAB | Age: 42
End: 2022-12-29

## 2022-12-29 ENCOUNTER — PATIENT OUTREACH (OUTPATIENT)
Dept: CASE MANAGEMENT | Age: 42
End: 2022-12-29

## 2022-12-29 NOTE — PROGRESS NOTES
Called pt as emergency contact for his mother, to get in touch with his mother. Pt proceeded to inform this ACM that his throat is still sore since personally being intubated recently. Pt stated his cardiology doctor told him the pain should be gone by now. It is noted that pt has not had a follow up appt with Dr Davey Greer for recent hospitalization. Asked pt to send a My Chart message to Dr Marielos Meneses team. Per verbal consent from pt, this AC sent staff message to Dr Marielos Meneses  asking to call pt and schedule his HFU.

## 2023-01-03 ENCOUNTER — HOSPITAL ENCOUNTER (OUTPATIENT)
Dept: CARDIAC REHAB | Age: 43
Discharge: HOME | End: 2023-01-03
Payer: COMMERCIAL

## 2023-01-03 VITALS — BODY MASS INDEX: 36.46 KG/M2 | HEIGHT: 69 IN | WEIGHT: 246.2 LBS

## 2023-01-03 PROCEDURE — 93798 PHYS/QHP OP CAR RHAB W/ECG: CPT

## 2023-01-03 PROCEDURE — 93797 PHYS/QHP OP CAR RHAB WO ECG: CPT

## 2023-01-03 NOTE — TELEPHONE ENCOUNTER
I contacted the patient via [employer] and I left a message on his cell phone.    Signed By: Lluvia Laird LPN     January 3, 8118

## 2023-01-03 NOTE — CARDIO/PULMONARY
[facility]    Cardiac Rehabilitation Program    Intake Appointment  1/3/2023           NAME: Alistair Arriaga : 1980 AGE: 36 y.o. GENDER: male    CARDIAC REHAB ADMITTING DIAGNOSIS: Presence of aortocoronary bypass graft [Z95.1]    REFERRING PHYSICIAN: Cinthia Akhtar MD    MEDICAL HX:  Past Medical History:   Diagnosis Date    Anxiety and depression     anxiety, depression    Bleeding of eye, left     21 pt reports receiving injections in right eye for beeding    Chronic headaches     COVID-19 2020    Diabetes type 1, uncontrolled     since 8years old    GERD (gastroesophageal reflux disease)     Hypercholesterolemia     Hypertension     Hypothyroidism     MI (myocardial infarction) ([facility] [address]     as of 21 pt reports 9 stents total    WALLY on CPAP        LABS:     Lab Results   Component Value Date/Time    Hemoglobin A1c 9.0 (H) 2022 12:27 PM    Hemoglobin A1c (POC) 9.1 2022 11:36 AM    Hemoglobin A1c, External 10.1 2022 12:00 AM     Lab Results   Component Value Date/Time    Cholesterol, total 148 2022 02:54 AM    HDL Cholesterol 33 2022 02:54 AM    LDL,Direct 151 (H) 2014 02:16 AM    LDL, calculated 45.8 2022 02:54 AM    VLDL, calculated 69.2 2022 02:54 AM    Triglyceride 346 (H) 2022 02:54 AM    CHOL/HDL Ratio 4.5 2022 02:54 AM         MEDICATIONS/SUPPLEMENTS:     Prior to Admission medications    Medication Sig Start Date End Date Taking? Authorizing Provider   Blood-Glucose Sensor (FreeStyle Mikal 3 Sensor) cole Change sensor every 14 days 22  Yes Malu Ryan MD   insulin regular (NOVOLIN R, HUMULIN R) 100 unit/mL injection 40 units with each meal, plus correction. Max daily dose of 150 units. Patient taking differently: 30 units with each meal, plus correction. Max daily dose of 150 units.  22  Yes Tianna Briones NP   levothyroxine (SYNTHROID) 125 mcg tablet Take 1 Tablet by mouth Daily (before breakfast). 12/9/22  Yes Saul Gomez NP   metFORMIN (GLUCOPHAGE) 500 mg tablet TAKE 1 TABLET BY MOUTH TWICE DAILY WITH MEALS 11/7/22  Yes Jacqueline Lema MD   insulin glargine U-300 conc (Toujeo Max U-300 SoloStar) 300 unit/mL (3 mL) inpn Take 140 units every day (new dosage) 10/31/22  Yes Jacqueline Lema MD   LORazepam (ATIVAN) 1 mg tablet Take 1 Tablet by mouth every eight (8) hours as needed for Anxiety. Max Daily Amount: 3 mg. 10/13/22  Yes Adele Fernandez NP   sertraline (ZOLOFT) 100 mg tablet Take 1 Tablet by mouth daily. 10/13/22  Yes Adele Fernandez NP   sertraline (ZOLOFT) 50 mg tablet Take 1 Tablet by mouth daily. 10/13/22  Yes Monie Fernandez NP   busPIRone (BUSPAR) 15 mg tablet Take 1 Tablet by mouth three (3) times daily. 8/12/22  Yes Shayne Avelar MD   Insulin Needles, Disposable, 32 gauge x 5/32\" ndle 5 shots per day (dispense whatever brand is covered by his insurance) 8/2/22  Yes Jacqueline Lema MD   insulin U-500 syringe-needle (BD Insulin Syringe U-500) 1/2 mL 31 gauge x 15/64\" syrg Three shots per day. 6/29/22  Yes Jacqueline Lema MD   esomeprazole (NEXIUM) 40 mg capsule TAKE 1 CAPSULE BY MOUTH ONCE DAILY BEFORE BREAKFAST DO NOT OPEN CAPSULE 4/27/22  Yes Provider, Historical   metoprolol succinate (TOPROL-XL) 25 mg XL tablet Take 25 mg by mouth two (2) times a day. Yes Provider, Historical   rosuvastatin (CRESTOR) 40 mg tablet Take 40 mg by mouth daily. 2/16/22  Yes Provider, Historical   famotidine (PEPCID) 40 mg tablet Take 1 Tablet by mouth daily. 1/19/22  Yes Sylvester Sanford MD   clopidogreL (PLAVIX) 75 mg tab Take 1 Tablet by mouth daily. 10/29/21  Yes Bessy Bautista MD   cholecalciferol (Vitamin D3) (5000 Units/125 mcg) tab tablet Take 1 Tab by mouth daily. 3/10/21  Yes Sylvester Sanford MD   Insulin Syringe-Needle U-100 (BD Insulin Syringe) 1 mL 25 x 1\" syrg Use for drawing up/injecting testosterone once weekly.  12/7/20  Yes Jacqueline Lema, MD   aspirin delayed-release 81 mg tablet Take 1 Tab by mouth daily. 9/24/19  Yes Regine Verduzco MD   furosemide (LASIX) 40 mg tablet Take in the morning when you wake up  Patient not taking: No sig reported 12/10/22   Gurwinder Phoenix NP   alum-mag hydroxide-simeth (MYLANTA) 200-200-20 mg/5 mL susp Take 30 mL by mouth every four (4) hours as needed for Indigestion. 9/14/22   Darron-Jeevan Scanlon MD   butalbital-acetaminophen-caffeine (FIORICET, ESGIC) -40 mg per tablet Take 1 Tablet by mouth every six (6) hours as needed for Headache. Indications: a migraine headache 6/13/22   Nance Closs, ANP-C       ANTHROPOMETRICS:      Ht Readings from Last 1 Encounters:   01/03/23 5' 9\" (1.753 m)      Wt Readings from Last 1 Encounters:   12/16/22 109.1 kg (240 lb 9.6 oz)        WAIST: 47.5    SCREENING SCORES:                       Real:    Naomi:    Rate Your Plate:    Cardiac Knowledge:    PHQ-9:         VISIT SUMMARY:    Girtha Siemens 36 y.o. presented to [facility] Cardiac [facility]hab for program orientation and 6 minute walk test today with a primary diagnosis of Presence of aortocoronary bypass graft [Z95.1]. .  Cardiac risk factors include family history, dyslipidemia, diabetes mellitus, obesity, hypertension, stress. EF is 55 %. Girtha Siemens is not  and lives with his mother. PHQ9, depression score, is 8 and this is considered mild. The result was discussed with patient who affirms score to be accurate and no intervention indicated. Patient denied chest pain or SOB during 6 minute walk test and was in SR/ST. Patient walked at a speed of 1.5 and grade of 0 for a final MET level of 2.1. Exercise prescription developed around patient stated goals, to be supplemented with home exercise recommendations. Education manual given to patient and reviewed. Patient will attend cardiac rehab 2 times/week and also plans to participate in educational classes.     Patient states that he/she would like to accomplish the following by completion of the program:    Initial Program Goals:  Lower A1C  2.    Gain strength/get back to baseline         [place]

## 2023-01-03 NOTE — TELEPHONE ENCOUNTER
From: Melissa Rodriguez  To: Kayy Monroy MD  Sent: 12/29/2022 9:32 AM EST  Subject: Throat pain and left arm numbness     Hey Dr. Jesus Figueroa,  I had open heart surgery here recently and scents they took the tubes out of my throat it's been hurting. I've been doing warm salt water, cough drops, chloraspeptic and I don't feel like it's getting any better and I also having issues with my left arm from the elbow to my hand of cramping up, numbness, in general just hurting. I was wondering what can I do about these issues and I would like to see you for a follow up.  Thank you,  Elizabeth Arellano

## 2023-01-04 ENCOUNTER — DOCUMENTATION ONLY (OUTPATIENT)
Dept: SLEEP MEDICINE | Age: 43
End: 2023-01-04

## 2023-01-04 LAB
GLUCOSE BLD STRIP.AUTO-MCNC: 278 MG/DL (ref 65–117)
SERVICE CMNT-IMP: ABNORMAL

## 2023-01-04 RX ORDER — LIDOCAINE HYDROCHLORIDE 20 MG/ML
15 SOLUTION OROPHARYNGEAL AS NEEDED
Qty: 1 EACH | Refills: 0 | Status: SHIPPED | OUTPATIENT
Start: 2023-01-04

## 2023-01-04 NOTE — TELEPHONE ENCOUNTER
Two pt identifiers confirmed. The patient wanted \"something\" stronger for his sore throat from when he had a \"tube\" down his throat. While I was on the phone I verified his OV for tomorrow. I advised him to continue to keep his feet/legs elevated to prevent swelling. Also to drink plenty of water. Pt verbalized understanding of information discussed w/ no further questions at this time.   Signed By: Kaylan Gusman LPN     January 4, 7571

## 2023-01-05 ENCOUNTER — DOCUMENTATION ONLY (OUTPATIENT)
Dept: ENDOCRINOLOGY | Age: 43
End: 2023-01-05

## 2023-01-05 ENCOUNTER — OFFICE VISIT (OUTPATIENT)
Dept: INTERNAL MEDICINE CLINIC | Age: 43
End: 2023-01-05
Payer: COMMERCIAL

## 2023-01-05 VITALS
SYSTOLIC BLOOD PRESSURE: 129 MMHG | DIASTOLIC BLOOD PRESSURE: 82 MMHG | BODY MASS INDEX: 40.64 KG/M2 | HEIGHT: 69 IN | TEMPERATURE: 98.4 F | HEART RATE: 84 BPM | RESPIRATION RATE: 18 BRPM | WEIGHT: 274.4 LBS | OXYGEN SATURATION: 98 %

## 2023-01-05 DIAGNOSIS — I21.4 NSTEMI (NON-ST ELEVATED MYOCARDIAL INFARCTION) (HCC): ICD-10-CM

## 2023-01-05 DIAGNOSIS — R07.0 THROAT PAIN: ICD-10-CM

## 2023-01-05 DIAGNOSIS — E11.21 TYPE 2 DIABETES WITH NEPHROPATHY (HCC): ICD-10-CM

## 2023-01-05 DIAGNOSIS — Z95.1 S/P CABG X 1: ICD-10-CM

## 2023-01-05 DIAGNOSIS — Z95.2 S/P AVR (AORTIC VALVE REPLACEMENT) AND AORTOPLASTY: ICD-10-CM

## 2023-01-05 DIAGNOSIS — Z09 HOSPITAL DISCHARGE FOLLOW-UP: ICD-10-CM

## 2023-01-05 DIAGNOSIS — I35.0 AORTIC VALVE STENOSIS, ETIOLOGY OF CARDIAC VALVE DISEASE UNSPECIFIED: Primary | ICD-10-CM

## 2023-01-05 PROCEDURE — 99214 OFFICE O/P EST MOD 30 MIN: CPT | Performed by: INTERNAL MEDICINE

## 2023-01-05 PROCEDURE — 1111F DSCHRG MED/CURRENT MED MERGE: CPT | Performed by: INTERNAL MEDICINE

## 2023-01-05 RX ORDER — BLOOD-GLUCOSE TRANSMITTER
EACH MISCELLANEOUS
Qty: 1 EACH | Refills: 3 | Status: SHIPPED | OUTPATIENT
Start: 2023-01-05

## 2023-01-05 RX ORDER — BLOOD-GLUCOSE,RECEIVER,CONT
EACH MISCELLANEOUS
Qty: 1 EACH | Refills: 0 | Status: SHIPPED | OUTPATIENT
Start: 2023-01-05

## 2023-01-05 RX ORDER — BLOOD-GLUCOSE SENSOR
EACH MISCELLANEOUS
Qty: 3 EACH | Refills: 11 | Status: SHIPPED | OUTPATIENT
Start: 2023-01-05

## 2023-01-05 NOTE — PROGRESS NOTES
1. \"Have you been to the ER, urgent care clinic since your last visit? Hospitalized since your last visit? Yes Open Heart Surgery 11/22/2022    2. \"Have you seen or consulted any other health care providers outside of the [address] [place] [address] since your last visit? \" No     3. For patients aged 39-70: Has the patient had a colonoscopy / FIT/ Cologuard?  Yes - no Care Gap present

## 2023-01-05 NOTE — PROGRESS NOTES
PROGRESS NOTE  Name: Barbara Land   : 1980       ASSESSMENT/ PLAN:     Diagnoses and all orders for this visit:  Hospital discharge follow-up  -     [facility] [address] MEDS RECONCILED W/CURRENT MED LIST        Aortic valve stenosis, etiology of cardiac valve disease unspecified: S/P AVR (aortic valve replacement) and aortoplasty [Z95.2 (ICD-10-CM)]. Surgery complicated by postop respiratory failure requiring reintubation. Follow up as per CT surgery and Cardiology-- [facility]. He has cardiac rehab sessions scheduled. NSTEMI (non-ST elevated myocardial infarction) ([facility] [address]    S/P CABG x 1 [Z95.1 (ICD-10-CM)]    Type 2 diabetes with nephropathy (HCC)    HTN: Controlled. Throat pain  -     REFERRAL TO ENT-OTOLARYNGOLOGY    Follow-up and Dispositions    Return in about 6 months (around 2023) for follow up HTN. I have reviewed the patient's medications and risks/side effects/benefits were discussed. Diagnosis(-es) explained to patient and questions answered. Literature provided where appropriate. SUBJECTIVE  Mr. Barbara Land presents today acutely for     Chief Complaint   Patient presents with    Hospital Follow Up       He was hospitalized 11/15 to  on Dr. Charity Conde service. From H and P:   36 y.o. male who presents with past medical history of coronary artery disease, hypertension, XOL, diabetes mellitus, extensive history of stents, GERD, WALLY not on CPAP, former smoker, hypothyroidism, anxiety and depression, that is referred to the [address] [place] [address] by Dr. Evie Artis for interventional evaluation of AS. Patient presented to the emergency department on 11/15/22 with c/o CP and SOB. Patient has been having issues with the same since 2019. Around 2019, he had 8 stents placed; he later needed another stent in 2021.  Presented to emergency department again on 9/15/22 with CP, but was sent home when his troponin and EKG were negative. They told him at that time that it may be heart burn. Since then, he has been working with Dr. Treasure Lujan with GI at [address] [place] [address]. He underwent a Upper GI with small bowel follow-through Double contrast on 22, which showed the followin. Mild esophageal dysmotility. 2. Gastric wall thickening may be exaggerated by incomplete distention, but gastritis is possible. Superimposed subcentimeter ulcerations in the distal stomach not excluded. Recommend further evaluation with endoscopy. 3. Mildly patulous thoracic esophagus with mild tapering distally. Apparent mural irregularity in the distal esophagus, seen only on a single image. This could also be better assessed endoscopically. 4. Borderline rapid small bowel transit time. No evidence of small bowel obstruction. 5. Patchy opacity in the left lung base. Recommend dedicated chest radiograph. (C&P from [facility] note)     He was started on PPIs (Nexium in the morning; 1 Prilosec in the afternoon and 1 Prilosec at bedtime; additionally, he was going through 1.5 bottles of Maalox per week) but continued to have symptoms, which he described as a pain in the epigastric area, that radiated to the back and down both arms; it would actually start to feel like both arms would go numb. Maalox and rest would sometimes give him relief; any kind of activity would make the discomfort worse. Plan was for a scope, which he was in the process of scheduling the day of admission. Went to see his primary care physician (Dr. Nino Putnam) again on the day of admission and was noted to have typical cardiac chest pain (substernal, pressure-like pain, radiating to the left arm) along with some shortness of breath for which she was advised to go to the emergency department. CP was associated with some exertional shortness of breath but not much cough or phlegm.     On arrival to ED, vital signs were within normal. On labs CBC was normal.  BMP showed sodium of 132, glucose of 297, creatinine of 1.10. Troponin was 714 and  proBNP was 208. Chest x-ray showed mild interstitial pulmonary edema versus interstitial pneumonia no pneumothorax. He was admitted for acute NSTEMI, and CHF r/o. He was started on a heparin gtt and home ASA, Plavix, BB and statin were resumed. Cardiology was consulted, an echocardiogram was ordered and he was given a one time dose of IV Lasix. He developed CP again overnight which was treated with nitro paste. He had a LHC today which showed Patent LAD stent with mild to moderate diffuse disease, jailed small diagonal branch with severe ostial narrowing, mild RCA, severe aortic stenosis with mean gradient 50.50 mm Hg, an elevated LVEDP, and a dilated ascending aorta. Endorses dizziness/lightheadedness, fatigue, CP, and SOB. Symptoms are intermittent. Denies syncope, PND, claudication, and LE edema. Endorses thyroid dz, tobacco abuse (Smoked half PPD x 18 years. Quit in ), family hx of cardiac dx (father- first MI at 48-  from [address]; maternal grandfather  at 62 from MI), and WALLY (not on CPAP)  Denies hx of CVA/TIA, difficulty swallowing, lung dz, previous thoracic surgery, liver dz, alcohol abuse, drug abuse, kidney dz, and PAD/PVD or vein stripping    Hospital course:     Pt underwent On pump CORONARY ARTERY BYPASS GRAFT  X 1 WITH LEFT INTERNAL MAMMARY ARTERY to LAD  AORTIC VALVE REPLACEMENT WITH MEYER 25MM INSPIRIS RESILIA TISSUE VALVE, REPAIR OF ASCENDING AORTIC ANEURYSM with 26mm hemashield graft  on 22 with Dr. Sridhar Washburn. Pt was transferred to the ICU in stable condition on Epinephrine, Neosynephrine, Precedex, and Insulin gtts. Pt was extubated night of surgery. POD 1 pt with fluid overload, worsening crt, transaminitis. That evening pt became hypoxic requiring Bipap and was ultimately re-intubated at 0200 for acute respiratory failure. Bronchoscopy was completed and was unremarkable.   CXR  SYLWIA was completed this am with normal RV and LVF without effusion or shunting. Initial rapid COVID was positive, subsequently 2 negative PCRs. All cultures and viral panels negative. CTA chest neg. Liver slowly began to recover and return to baseline. Pt was extubated second time on POD# 9 to hi-flow. Oxygen was weaned and removed finally on POD # 14. Endurance was a lee postop, he struggled with SOB/COLE. Pt was working hard on his mobility and was found safe to discharge home with 24/7 assistance and in home therapy. Pt found medically ready and safe for discharge home on 12/9/22 with the following plan:    POD#17:  Severe symptomatic AS, s/p tissue AVR. Continue ASA. CAD, STEMI, s/p CABG:ASA, statin, lisinopril,  and beta blocker. Repeat Echo without effusion or tamponade, NL LV/RVF. On statin. Acute hypoxic respiratory failure: Cont diuresis, hourly IS. Nebs BID and PRN. Flutter valve. On RA  SERGE: Crt up to 2.01. Nephrology consult appreciated, avoid nephrotoxins, trend. Crt 1.12 this am. Cont Lasix 40 mg daily PO  Acute blood loss anemia: improving, H&H up to 9.5/31 this am  Transaminitis. Acute Severe Hepatitis, GI consult appreciated, + hepatomegaly and splenomegaly. Viral panel neg. Triple phase liver scan completed 11/30. No hepatic or splenic ischemia noted. + hepatic steatosis. Labs cont to trend down. No amiodarone. Statin restarted. Leukocytosis: resolved. BC, Sputum and urine cultures NGTD. ID following, Completed Meropenem. Rapid COVID pos, 2 PCR negative. WBC up to 22.6, 8.5 today, Afebrile. HTN: metoprolol. Will need to restart lisinopril when BP is more robust and creatinine normal. Preop creatinine was 0.97. HLD: statin restarted. Check LFTs in a month. WALLY , not on CPAP. Was unable to tolerate due to anxiety; he has been working on this with Sleep Medicine. OP follow-up. refusing CPAP  DMII. On Toujeo at home. Repeat A1C 9.0. Diabetes management following. Cont lantus and mealtime coverage.   Call and text out to Diabetes Management CNS, Nichole Bowen for input for discharge insulin orders. GERD. Continue PPI. Anxiety and depression. On Buspar, Zoloft; continue. Supportive care. Ileus: resolved. + BMs. Reduce bowel regimen. Nutrition:carb controlled diet. Had discussion with patient regarding diet. Recommended he learn to count carbs and limit carbs to 60 grams per meal.  Discussed the possible effect of uncontrolled diabetes on wound healing. Deconditioning/mobility: OOB TID, ambulating BID. Arm exercises. Home PT. Discharge to home today with home PT and home health nursing. Now, he has been suffering throat pain, which he attributes to the ET tube from surgery. He has anterior chest wall pain. \"It hurts when I bend over, so I can't bend over. \"     He has had numbness from hip to R knee. He complains also of mumbness L arm from elbow to finger tips. Breathing is okay. Blood sugar has been a bit labile since getting home. He is seeing Dr. Tere Monterroso. Past Medical History:   Diagnosis Date    Anxiety and depression     anxiety, depression    Bleeding of eye, left     8/17/21 pt reports receiving injections in right eye for beeding    Chronic headaches 2007    COVID-19 12/20/2020    Diabetes type 1, uncontrolled     since 8years old    GERD (gastroesophageal reflux disease)     Hypercholesterolemia     Hypertension     Hypothyroidism     MI (myocardial infarction) ([facility] [address]     as of 8/17/21 pt reports 9 stents total    WALLY on CPAP      Current Outpatient Medications on File Prior to Visit   Medication Sig Dispense Refill    lidocaine (XYLOCAINE) 2 % solution Take 15 mL by mouth as needed for Pain. 1 Each 0    Blood-Glucose Sensor (FreeStyle Mikal 3 Sensor) cole Change sensor every 14 days 2 Each 5    insulin regular (NOVOLIN R, HUMULIN R) 100 unit/mL injection 40 units with each meal, plus correction. Max daily dose of 150 units.  (Patient taking differently: 30 units with each meal, plus correction. Max daily dose of 150 units.) 20 mL 5    levothyroxine (SYNTHROID) 125 mcg tablet Take 1 Tablet by mouth Daily (before breakfast). 90 Tablet 3    metFORMIN (GLUCOPHAGE) 500 mg tablet TAKE 1 TABLET BY MOUTH TWICE DAILY WITH MEALS 180 Tablet 3    insulin glargine U-300 conc (Toujeo Max U-300 SoloStar) 300 unit/mL (3 mL) inpn Take 140 units every day (new dosage) 30 mL 5    LORazepam (ATIVAN) 1 mg tablet Take 1 Tablet by mouth every eight (8) hours as needed for Anxiety. Max Daily Amount: 3 mg. 90 Tablet 2    sertraline (ZOLOFT) 100 mg tablet Take 1 Tablet by mouth daily. 30 Tablet 2    sertraline (ZOLOFT) 50 mg tablet Take 1 Tablet by mouth daily. 30 Tablet 2    alum-mag hydroxide-simeth (MYLANTA) 200-200-20 mg/5 mL susp Take 30 mL by mouth every four (4) hours as needed for Indigestion. 150 mL 0    busPIRone (BUSPAR) 15 mg tablet Take 1 Tablet by mouth three (3) times daily. 90 Tablet 2    Insulin Needles, Disposable, 32 gauge x 5/32\" ndle 5 shots per day (dispense whatever brand is covered by his insurance) 500 Pen Needle 3    insulin U-500 syringe-needle (BD Insulin Syringe U-500) 1/2 mL 31 gauge x 15/64\" syrg Three shots per day. 300 Syringe 3    butalbital-acetaminophen-caffeine (FIORICET, ESGIC) -40 mg per tablet Take 1 Tablet by mouth every six (6) hours as needed for Headache. Indications: a migraine headache 10 Tablet 0    esomeprazole (NEXIUM) 40 mg capsule TAKE 1 CAPSULE BY MOUTH ONCE DAILY BEFORE BREAKFAST DO NOT OPEN CAPSULE      metoprolol succinate (TOPROL-XL) 25 mg XL tablet Take 25 mg by mouth two (2) times a day. rosuvastatin (CRESTOR) 40 mg tablet Take 40 mg by mouth daily. famotidine (PEPCID) 40 mg tablet Take 1 Tablet by mouth daily. 30 Tablet 3    clopidogreL (PLAVIX) 75 mg tab Take 1 Tablet by mouth daily. 30 Tablet 12    cholecalciferol (Vitamin D3) (5000 Units/125 mcg) tab tablet Take 1 Tab by mouth daily.  90 Tab 1    Insulin Syringe-Needle U-100 (BD Insulin Syringe) 1 mL 25 x 1\" syrg Use for drawing up/injecting testosterone once weekly. 100 Each 3    aspirin delayed-release 81 mg tablet Take 1 Tab by mouth daily. 30 Tab 0    furosemide (LASIX) 40 mg tablet Take in the morning when you wake up (Patient not taking: No sig reported) 7 Tablet 0     No current facility-administered medications on file prior to visit. ROS: Complete review of systems was performed and is otherwise unremarkable except as noted elsewhere. OBJECTIVE  Visit Vitals  /82 (BP 1 Location: Right upper arm, BP Patient Position: Sitting, BP Cuff Size: Large adult)   Pulse 84   Temp 98.4 °F (36.9 °C) (Temporal)   Resp 18   Ht 5' 9\" (1.753 m)   Wt 274 lb 6.4 oz (124.5 kg)   SpO2 98%   BMI 40.52 kg/m²     Gen: Pleasant 36 y.o.  male in NAD. HEENT: PERRLA. EOMI. OP moist and pink. Neck: Supple. No LAD. HEART: RRR, No M/G/R.   LUNGS: CTAB No W/R. ABDOMEN: S, NT, ND, BS+. EXTREMITIES: Warm. No C/C/E. MUSCULOSKELETAL: Normal ROM, muscle strength 5/5 all groups. NEURO: Alert and oriented x 3. Cranial nerves grossly intact. No focal sensory or motor deficits noted. SKIN: Warm. Dry. Surgical wounds appear to be well healed.

## 2023-01-05 NOTE — TELEPHONE ENCOUNTER
Patient was denied Mikal 3 sensor. Spoke with patient. He states he is willing to try Dexcom, which is covered by insurance. Would like Rx.

## 2023-01-10 NOTE — PROGRESS NOTES
CHIEF COMPLAINT:  Alexa Mooney is a 36 y.o. male and was seen today for follow-up of psychiatric condition and psychotropic medication management. HPI:    Sue Mckee reports the following psychiatric symptoms by hx:  depression and anxiety. Overall symptoms have been present for years. Currently symptoms are is of moderate/high severity. Anxiety is exacerbated. The symptoms occur intermittently. Pt reports medications are effective, but symptoms are exacerbated. Precipitating factors: Chronic  illness, Alleviating factors: none Met with pt for appt today  to review current treatment plan. FAMILY/SOCIAL HX:   Psychosocial Stressors      REVIEW OF SYSTEMS:  Psychiatric symptoms being monitored for:  depression, anxiety   Appetite:good   Sleep: poor with DIS (difficulty initiating sleep)   Neuro: denies    Cardio: denies   There were no vitals taken for this visit. There were no vitals taken for this visit. Side Effects:  none    MENTAL STATUS EXAM:   Sensorium  oriented to time, place and person   Relations cooperative   Appearance:  age appropriate and within normal Limits   Motor Behavior:  within normal limits   Speech:  normal pitch and normal volume   Thought Process: within normal limits   Thought Content free of delusions and free of hallucinations   Suicidal ideations none   Homicidal ideations none   Mood:  Anxious    Affect:  mood-congruent   Memory recent  adequate   Memory remote:  adequate   Concentration:  adequate   Abstraction:  abstract   Insight:  good   Reliability good   Judgment:  good          MEDICAL DECISION MAKING:  Problems addressed today:    ICD-10-CM ICD-9-CM    1. Insomnia disorder with non-sleep disorder mental comorbidity  G47.00 780.52       2. CESAR (generalized anxiety disorder)  F41.1 300.02 LORazepam (ATIVAN) 1 mg tablet      sertraline (ZOLOFT) 100 mg tablet      sertraline (ZOLOFT) 50 mg tablet      3. PTSD (post-traumatic stress disorder)  F43.10 309.81       4. Adjustment disorder with mixed emotional features  F43.29 309.9               Assessment:   Sue Mckee is responding to treatment. Symptoms are slightly exacerbated. Patient is experiencing more anxiety. He is having problems with heart burn and the fear of having an MI exacerbated anxiety. Discussed current medications and dosages. Will increase Zoloft to a total of 150 mg. Reviewed risks vs benefits and side effects. Reviewed treatment goals and target symptoms to monitor for. Educated patient on psychotherapy, referred patient to Kyara Chi at [facility].     Plan:   1. Current Outpatient Medications   Medication Sig Dispense Refill    LORazepam (ATIVAN) 1 mg tablet Take 1 Tablet by mouth every eight (8) hours as needed for Anxiety. Max Daily Amount: 3 mg. 90 Tablet 2    sertraline (ZOLOFT) 100 mg tablet Take 1 Tablet by mouth daily. 30 Tablet 2    sertraline (ZOLOFT) 50 mg tablet Take 1 Tablet by mouth daily. 30 Tablet 2    lidocaine (LMX 4) 4 % topical cream Apply  to affected area two (2) times daily as needed for Pain. 15 g 1    insulin glargine U-300 conc (Toujeo Max U-300 SoloStar) 300 unit/mL (3 mL) inpn Take 120 units every day (new dosage) 15 mL 5    alum-mag hydroxide-simeth (MYLANTA) 200-200-20 mg/5 mL susp Take 30 mL by mouth every four (4) hours as needed for Indigestion. 150 mL 0    levothyroxine (SYNTHROID) 125 mcg tablet Take 1 Tablet by mouth Daily (before breakfast). 90 Tablet 3    insulin regular (NOVOLIN R, HUMULIN R) 100 unit/mL injection 40 units with each meal, plus correction. Max daily dose of 150 units. 40 mL 5    busPIRone (BUSPAR) 15 mg tablet Take 1 Tablet by mouth three (3) times daily. 90 Tablet 2    Insulin Needles, Disposable, 32 gauge x 5/32\" ndle 5 shots per day (dispense whatever brand is covered by his insurance) 500 Pen Needle 3    lisinopriL (PRINIVIL, ZESTRIL) 10 mg tablet Take 10 mg by mouth daily.       testosterone (ANDROGEL) 20.25 mg/1.25 gram (1.62 %) gel Apply 4 pump presses daily. Ok to dispense generic testosterone. 2 Each 5    insulin U-500 syringe-needle (BD Insulin Syringe U-500) 1/2 mL 31 gauge x 15/64\" syrg Three shots per day. 300 Syringe 3    butalbital-acetaminophen-caffeine (FIORICET, ESGIC) -40 mg per tablet Take 1 Tablet by mouth every six (6) hours as needed for Headache. Indications: a migraine headache (Patient not taking: No sig reported) 10 Tablet 0    esomeprazole (NEXIUM) 40 mg capsule TAKE 1 CAPSULE BY MOUTH ONCE DAILY BEFORE BREAKFAST DO NOT OPEN CAPSULE      metoprolol succinate (TOPROL-XL) 25 mg XL tablet Take 25 mg by mouth two (2) times a day. rosuvastatin (CRESTOR) 40 mg tablet Take 40 mg by mouth daily. lidocaine (XYLOCAINE) 2 % solution Take 15 mL by mouth as needed for Pain. Gargle and spit out to help with pain (Patient not taking: No sig reported) 1 Each 0    famotidine (PEPCID) 40 mg tablet Take 1 Tablet by mouth daily. 30 Tablet 3    clopidogreL (PLAVIX) 75 mg tab Take 1 Tablet by mouth daily. 30 Tablet 12    metFORMIN (GLUCOPHAGE) 500 mg tablet Take 1 Tablet by mouth two (2) times daily (with meals). 180 Tablet 3    cholecalciferol (Vitamin D3) (5000 Units/125 mcg) tab tablet Take 1 Tab by mouth daily. 90 Tab 1    Insulin Syringe-Needle U-100 (BD Insulin Syringe) 1 mL 25 x 1\" syrg Use for drawing up/injecting testosterone once weekly. 100 Each 3    nitroglycerin (NITROSTAT) 0.4 mg SL tablet Take 1 Tab by mouth every five (5) minutes as needed for Chest Pain. Sit down then put one tab under the tongue every 5 minutes as needed 1 Bottle 0    aspirin delayed-release 81 mg tablet Take 1 Tab by mouth daily. 30 Tab 0          medication changes made today: Increased Zoloft to total 150 mg     2. Counseling and coordination of care including instructions for treatment, risks/benefits, risk factor reduction and patient/family education. He agrees with the plan.  Patient instructed to call with any side effects, questions or issues. Buel Bumpers, was evaluated through a synchronous (real-time) audio-video encounter. The patient (or guardian if applicable) is aware that this is a billable service, which includes applicable co-pays. This Virtual Visit was conducted with patient's (and/or legal guardian's) consent. The visit was conducted pursuant to the emergency declaration under the [address] [place] [address] and the [employer] General Act. Patient identification was verified, and a caregiver was present when appropriate. The patient was located at: Home: [facility] [address] [phone]  The provider was located at: Facility ([facility] [address] [facility]  [address]       An electronic signature was used to authenticate this note.   -- Ya Torres NP       10/13/2022 Skin Substitute Text: The defect edges were debeveled with a #15 scalpel blade.  Given the location of the defect, shape of the defect and the proximity to free margins a skin substitute graft was deemed most appropriate.  The graft material was trimmed to fit the size of the defect. The graft was then placed in the primary defect and oriented appropriately.

## 2023-01-11 ENCOUNTER — HOSPITAL ENCOUNTER (OUTPATIENT)
Dept: CARDIAC REHAB | Age: 43
Discharge: HOME | End: 2023-01-11
Payer: COMMERCIAL

## 2023-01-11 VITALS — BODY MASS INDEX: 36.77 KG/M2 | WEIGHT: 249 LBS

## 2023-01-11 PROCEDURE — 93798 PHYS/QHP OP CAR RHAB W/ECG: CPT

## 2023-01-12 ENCOUNTER — TELEPHONE (OUTPATIENT)
Dept: ENDOCRINOLOGY | Age: 43
End: 2023-01-12

## 2023-01-12 ENCOUNTER — TELEPHONE (OUTPATIENT)
Dept: SLEEP MEDICINE | Age: 43
End: 2023-01-12

## 2023-01-12 NOTE — TELEPHONE ENCOUNTER
Per CMM: outcome unknown. Called patient. Asked him to call his insurance company to ask the status of the PA. Patient expressed understanding.

## 2023-01-13 NOTE — TELEPHONE ENCOUNTER
Spoke with [facility]. Pharm tech re-ran the prescriptions. Still needs PAs for all 3 components of Dexcom. Pharm tech will fax the PA info.

## 2023-01-16 ENCOUNTER — HOSPITAL ENCOUNTER (OUTPATIENT)
Dept: CARDIAC REHAB | Age: 43
Discharge: HOME | End: 2023-01-16
Payer: COMMERCIAL

## 2023-01-16 VITALS — BODY MASS INDEX: 37.3 KG/M2 | WEIGHT: 252.6 LBS

## 2023-01-16 PROCEDURE — 93798 PHYS/QHP OP CAR RHAB W/ECG: CPT

## 2023-01-18 ENCOUNTER — APPOINTMENT (OUTPATIENT)
Dept: CARDIAC REHAB | Age: 43
End: 2023-01-18
Payer: COMMERCIAL

## 2023-01-19 ENCOUNTER — HOSPITAL ENCOUNTER (OUTPATIENT)
Dept: CARDIAC REHAB | Age: 43
Discharge: HOME | End: 2023-01-19
Payer: COMMERCIAL

## 2023-01-19 VITALS — BODY MASS INDEX: 36.98 KG/M2 | WEIGHT: 250.4 LBS

## 2023-01-19 PROCEDURE — 93798 PHYS/QHP OP CAR RHAB W/ECG: CPT

## 2023-01-23 ENCOUNTER — APPOINTMENT (OUTPATIENT)
Dept: CARDIAC REHAB | Age: 43
End: 2023-01-23
Payer: COMMERCIAL

## 2023-01-25 ENCOUNTER — HOSPITAL ENCOUNTER (OUTPATIENT)
Dept: CARDIAC REHAB | Age: 43
Discharge: HOME | End: 2023-01-25
Payer: COMMERCIAL

## 2023-01-25 ENCOUNTER — HOSPITAL ENCOUNTER (OUTPATIENT)
Dept: CARDIAC REHAB | Age: 43
End: 2023-01-25
Payer: COMMERCIAL

## 2023-01-25 VITALS — WEIGHT: 257.6 LBS | BODY MASS INDEX: 38.04 KG/M2

## 2023-01-25 PROCEDURE — 93798 PHYS/QHP OP CAR RHAB W/ECG: CPT

## 2023-01-25 PROCEDURE — 93797 PHYS/QHP OP CAR RHAB WO ECG: CPT

## 2023-01-26 ENCOUNTER — DOCUMENTATION ONLY (OUTPATIENT)
Dept: ENDOCRINOLOGY | Age: 43
End: 2023-01-26

## 2023-01-27 ENCOUNTER — OFFICE VISIT (OUTPATIENT)
Dept: ENDOCRINOLOGY | Age: 43
End: 2023-01-27
Payer: COMMERCIAL

## 2023-01-27 VITALS
BODY MASS INDEX: 37.23 KG/M2 | DIASTOLIC BLOOD PRESSURE: 62 MMHG | SYSTOLIC BLOOD PRESSURE: 117 MMHG | HEIGHT: 69 IN | HEART RATE: 85 BPM | WEIGHT: 251.4 LBS

## 2023-01-27 DIAGNOSIS — E78.2 MIXED HYPERLIPIDEMIA: ICD-10-CM

## 2023-01-27 DIAGNOSIS — E10.42 TYPE 1 DIABETES MELLITUS WITH DIABETIC POLYNEUROPATHY (HCC): Primary | ICD-10-CM

## 2023-01-27 DIAGNOSIS — E03.9 ACQUIRED HYPOTHYROIDISM: ICD-10-CM

## 2023-01-27 DIAGNOSIS — E29.1 HYPOGONADISM IN MALE: ICD-10-CM

## 2023-01-27 LAB — HBA1C MFR BLD HPLC: 8.1 %

## 2023-01-27 RX ORDER — INSULIN GLARGINE 300 U/ML
INJECTION, SOLUTION SUBCUTANEOUS
Qty: 45 ML | Refills: 5 | Status: SHIPPED | OUTPATIENT
Start: 2023-01-27

## 2023-01-27 NOTE — PROGRESS NOTES
Chief Complaint   Patient presents with    Diabetes     Pcp and pharmacy verified     History of Present Illness: Trent Ledesma is a 36 y.o. male here for follow up of diabetes. 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 Carole Joe of Cardiology. Pt has been experiencing more issues of CP and presented to the ED on 11/15/22 and was found to have multi-vessel disease. He was taken to the OR for CORONARY ARTERY BYPASS GRAFT  X 1 WITH LEFT INTERNAL MAMMARY ARTERY to LAD AORTIC VALVE REPLACEMENT WITH MEYER 25MM INSPIRIS RESILIA TISSUE VALVE   REPAIR OF ASCENDING AORTIC ANEURYSM with 26mm hemashield graft. He was in the ICU on a vent for several days and was eventually discharged home on 10/9/22. He called me 10/10/22 and he noted that his BGs were running in the 300-400s. I instructed him to take Toujeo 140 units once per day (or he can take 70 units BID) and to take Novolin R 30 units with each meal plus 5:50 correction for BG >150. Pt has been healing well from his surgery. Pt notes he has been experiencing fewer issues of lightheadedness. He has been testing his BPs and notes that his pressures have been running in the 110s-120s/60s at home. He notes his chest pain is improving, but he continues to have numbness in his left arm and right leg. \"2-3 times since surgery my leg will fall asleep\". Pt is still walking as much as possible, he is going to cardiac rehab and has started classes on healthy eating and stress control. His BP today was 117/62    For his issues of vertigo, and dizziness he is followed by Dr. Juwan Pool of Neurology. \"I need to see him in follow up. \"      Pt is taking Toujeo 76 units in the AM and 76 units HS and Novolin R 30 units with each meal, plus correction. 5:50 for BG >50. Pt is testing his BGs 4 times per day. His BGs are improving. His FBGs have been in the 160-200s.  His BGs before lunch, after lunch and before dinner his BGs have been running in the 100-300s range. He has not had any issues of hypoglycemia. Pt is still following with Dr. Jd Suresh of psychiatry for anxiety and depression. Pt has hx of neuropathy. No hx of nephropathy. Pt has hx of hypothyroidism, since the age of 11-9 years old. He is currently taking 112mcg daily. At our visit in October 2019 we tested his Thyroid labs, which were normal, but he had a very low Testosterone of 199 on 10/22/19 and repeat was 240 in 10/28/19. His FSH, LH and prolactin were unremarkable. Pt opted to  Not start Clomid 50mg every other day. At our last visit we again ordered the clomid. Pt notes that he has been taking the Clomid 50mg every other day. He notes he is still having issues of ED and poor libido so he stopped taking the Clomid because of the cost.  He wanted to discuss trying an alternative agent. He was taken off the the testosterone. He is not currently taking the topical testosterone. \"I am using it once in a blood moon. I have not seen any benefit from it. \"  When he was using two pumps per day. Pt notes he was changed from CPAP to BiPap, but he had a panic attack and the BiPap made it worse \"so I have not used it since. \" \"I am trying to get healed from the surgery and I will see the sleep doctor again. \"    Pt is still taking his LT4 125mcg daily. Current Outpatient Medications   Medication Sig    Blood-Glucose Meter,Continuous (Dexcom G6 ) misc Use to read blood sugars. DX: E10.42    Blood-Glucose Transmitter (Dexcom G6 Transmitter) cole Use on sensor to read blood sugar. Change every 90 days. DX: E10.42    Blood-Glucose Sensor (Dexcom G6 Sensor) cole Use to read blood sugars. Change every 10 days. DX: E10.42    lidocaine (XYLOCAINE) 2 % solution Take 15 mL by mouth as needed for Pain.    levothyroxine (SYNTHROID) 125 mcg tablet Take 1 Tablet by mouth Daily (before breakfast).     metFORMIN (GLUCOPHAGE) 500 mg tablet TAKE 1 TABLET BY MOUTH TWICE DAILY WITH MEALS    insulin glargine U-300 conc (Toujeo Max U-300 SoloStar) 300 unit/mL (3 mL) inpn Take 140 units every day (new dosage) (Patient taking differently: Take 76 units twice a day)    LORazepam (ATIVAN) 1 mg tablet Take 1 Tablet by mouth every eight (8) hours as needed for Anxiety. Max Daily Amount: 3 mg.    sertraline (ZOLOFT) 100 mg tablet Take 1 Tablet by mouth daily. sertraline (ZOLOFT) 50 mg tablet Take 1 Tablet by mouth daily. alum-mag hydroxide-simeth (MYLANTA) 200-200-20 mg/5 mL susp Take 30 mL by mouth every four (4) hours as needed for Indigestion. busPIRone (BUSPAR) 15 mg tablet Take 1 Tablet by mouth three (3) times daily. Insulin Needles, Disposable, 32 gauge x 5/32\" ndle 5 shots per day (dispense whatever brand is covered by his insurance)    insulin U-500 syringe-needle (BD Insulin Syringe U-500) 1/2 mL 31 gauge x 15/64\" syrg Three shots per day. butalbital-acetaminophen-caffeine (FIORICET, ESGIC) -40 mg per tablet Take 1 Tablet by mouth every six (6) hours as needed for Headache. Indications: a migraine headache    esomeprazole (NEXIUM) 40 mg capsule TAKE 1 CAPSULE BY MOUTH ONCE DAILY BEFORE BREAKFAST DO NOT OPEN CAPSULE    metoprolol succinate (TOPROL-XL) 25 mg XL tablet Take 25 mg by mouth two (2) times a day. rosuvastatin (CRESTOR) 40 mg tablet Take 40 mg by mouth daily. famotidine (PEPCID) 40 mg tablet Take 1 Tablet by mouth daily. clopidogreL (PLAVIX) 75 mg tab Take 1 Tablet by mouth daily. cholecalciferol (Vitamin D3) (5000 Units/125 mcg) tab tablet Take 1 Tab by mouth daily. Insulin Syringe-Needle U-100 (BD Insulin Syringe) 1 mL 25 x 1\" syrg Use for drawing up/injecting testosterone once weekly. aspirin delayed-release 81 mg tablet Take 1 Tab by mouth daily.     insulin regular (HumuLIN R Regular U-100 Insuln) 100 unit/mL injection INJECT 35 UNITS SUBCUTANEOUSLY THREE TIMES DAILY WITH EACH MEAL, plus correction. Max daily dose of 150 units (Patient taking differently: INJECT 30 UNITS SUBCUTANEOUSLY THREE TIMES DAILY WITH EACH MEAL, plus correction. Max daily dose of 150 units or 10 units if eats small amount of carbs, plus correction. If blood sugar is over 150, will use correction scale only.)    Blood-Glucose Sensor (FreeStyle Mikal 3 Sensor) cole Change sensor every 14 days (Patient not taking: Reported on 1/27/2023)     No current facility-administered medications for this visit. Allergies   Allergen Reactions    Gabapentin Swelling     Of feet    Morphine Nausea and Vomiting    Penicillin G Swelling    Percocet [Oxycodone-Acetaminophen] Hives and Nausea and Vomiting     Pt is allergic to Oxycodone, has previously tolerated Tylenol and Hydrocodone. Sulfa (Sulfonamide Antibiotics) Swelling    Tramadol Nausea Only     Nausea and headache       Review of Systems:  - Eyes: no blurry vision or double vision  - Cardiovascular: no chest pain  - Respiratory: no shortness of breath  - Musculoskeletal: no myalgias  - Neurological: no numbness/tingling in extremities    Physical Examination:  Blood pressure 117/62, pulse 85, height 5' 9\" (1.753 m), weight 251 lb 6.4 oz (114 kg). General: pleasant, no distress, good eye contact   Neck: no carotid bruits  Cardiovascular: regular, normal rate, nl s1 and s2, no m/r/g, 2+ DP pulses   Respiratory: clear bilaterally  Integumentary: no edema, no foot ulcers,   Psychiatric: normal mood and affect    Diabetic foot exam:     Left Foot:   Visual Exam: callous - present   Pulse DP: 2+ (normal)   Filament test: normal sensation    Vibratory sensation: normal      Right Foot:   Visual Exam: callous - present   Pulse DP: 2+ (normal)   Filament test: normal sensation    Vibratory sensation: normal        Data Reviewed:   His A1C today was 8.1%. Assessment/Plan:   1) DM > His A1C was 8.1%, which is down from 9.1% in November 2022.   Pt to increase his Toujeo to 85 units in the AM and 85 units HS, pt to continue the Novolin R 35 units TID/AC plus 5:50 correction for BG > 150  Pt to check his BGs 4 times per day and mail his BG logs to me in 4 weeks. Because he is taking 5 dose of insulin per day and adjusting his insulin doses based on his BG readings he would benefit from a CGM. He was not able to tolerate the Hernandez 2 so I ordered the Hernandez 3 CGM. 2) Hypothyroidism > Pt is clinically euthyroid on LT4 112mcg daily. His TSH prior to the surgery was 1.96 his FT4 of 0.9. Pt to continue the LT4 125mcg daily. 3) Hypogonadism > Will have pt stay off the Testosterone till he is cleared by his Cardiologist.      4) HLD > Pt to continue the Rosuvastatin 40mg daily. His lipid panel in the hospital was at goal.      Pt voices understanding and agreement with the plan.   Pain noted and pt was recommended to call his PCP for further evaluation and treatment, as needed    RTC 6 weeks      Copy sent to:  Dr. Marcos Black

## 2023-01-27 NOTE — LETTER
1/27/2023    Patient: Fransisco Lesch   YOB: 1980   Date of Visit: 1/27/2023     Agustina Hamilton MD  [address]  [address]  [address] [id number]  [place] [place] [facility] [address]    Dear Agustina Hamilton MD,      Thank you for referring Mr. Cathi Medina to [address] [place] [address] for evaluation. My notes for this consultation are attached. If you have questions, please do not hesitate to call me. I look forward to following your patient along with you.       Sincerely,    Margy Small MD

## 2023-01-30 ENCOUNTER — HOSPITAL ENCOUNTER (OUTPATIENT)
Dept: CARDIAC REHAB | Age: 43
Discharge: HOME | End: 2023-01-30
Payer: COMMERCIAL

## 2023-01-30 VITALS — BODY MASS INDEX: 37.45 KG/M2 | WEIGHT: 253.6 LBS

## 2023-01-30 DIAGNOSIS — F41.1 GAD (GENERALIZED ANXIETY DISORDER): ICD-10-CM

## 2023-01-30 PROCEDURE — 93797 PHYS/QHP OP CAR RHAB WO ECG: CPT

## 2023-01-30 PROCEDURE — 93798 PHYS/QHP OP CAR RHAB W/ECG: CPT

## 2023-02-01 ENCOUNTER — APPOINTMENT (OUTPATIENT)
Dept: CARDIAC REHAB | Age: 43
End: 2023-02-01
Payer: COMMERCIAL

## 2023-02-01 RX ORDER — SERTRALINE HYDROCHLORIDE 50 MG/1
TABLET, FILM COATED ORAL
Qty: 30 TABLET | Refills: 0 | Status: SHIPPED | OUTPATIENT
Start: 2023-02-01

## 2023-02-06 ENCOUNTER — HOSPITAL ENCOUNTER (OUTPATIENT)
Dept: CARDIAC REHAB | Age: 43
Discharge: HOME | End: 2023-02-06
Payer: COMMERCIAL

## 2023-02-06 VITALS — WEIGHT: 256 LBS | BODY MASS INDEX: 37.8 KG/M2

## 2023-02-06 PROCEDURE — 93798 PHYS/QHP OP CAR RHAB W/ECG: CPT

## 2023-02-08 ENCOUNTER — HOSPITAL ENCOUNTER (OUTPATIENT)
Dept: CARDIAC REHAB | Age: 43
Discharge: HOME | End: 2023-02-08
Payer: COMMERCIAL

## 2023-02-08 VITALS — WEIGHT: 253 LBS | BODY MASS INDEX: 37.36 KG/M2

## 2023-02-08 PROCEDURE — 93798 PHYS/QHP OP CAR RHAB W/ECG: CPT

## 2023-02-13 ENCOUNTER — APPOINTMENT (OUTPATIENT)
Dept: CARDIAC REHAB | Age: 43
End: 2023-02-13
Payer: COMMERCIAL

## 2023-02-15 ENCOUNTER — OFFICE VISIT (OUTPATIENT)
Dept: BEHAVIORAL/MENTAL HEALTH CLINIC | Age: 43
End: 2023-02-15
Payer: COMMERCIAL

## 2023-02-15 ENCOUNTER — HOSPITAL ENCOUNTER (OUTPATIENT)
Dept: CARDIAC REHAB | Age: 43
Discharge: HOME | End: 2023-02-15
Payer: COMMERCIAL

## 2023-02-15 ENCOUNTER — TELEPHONE (OUTPATIENT)
Dept: ENDOCRINOLOGY | Age: 43
End: 2023-02-15

## 2023-02-15 VITALS
OXYGEN SATURATION: 98 % | WEIGHT: 259.2 LBS | BODY MASS INDEX: 38.39 KG/M2 | HEART RATE: 81 BPM | RESPIRATION RATE: 16 BRPM | DIASTOLIC BLOOD PRESSURE: 64 MMHG | HEIGHT: 69 IN | SYSTOLIC BLOOD PRESSURE: 139 MMHG | TEMPERATURE: 98.2 F

## 2023-02-15 VITALS — WEIGHT: 259.8 LBS | BODY MASS INDEX: 38.37 KG/M2

## 2023-02-15 DIAGNOSIS — F41.1 GAD (GENERALIZED ANXIETY DISORDER): ICD-10-CM

## 2023-02-15 DIAGNOSIS — F43.29 ADJUSTMENT DISORDER WITH MIXED EMOTIONAL FEATURES: Primary | ICD-10-CM

## 2023-02-15 DIAGNOSIS — G47.00 INSOMNIA DISORDER WITH NON-SLEEP DISORDER MENTAL COMORBIDITY: ICD-10-CM

## 2023-02-15 DIAGNOSIS — F43.10 PTSD (POST-TRAUMATIC STRESS DISORDER): ICD-10-CM

## 2023-02-15 PROCEDURE — 93798 PHYS/QHP OP CAR RHAB W/ECG: CPT

## 2023-02-15 RX ORDER — LORAZEPAM 1 MG/1
1 TABLET ORAL
Qty: 90 TABLET | Refills: 2 | Status: SHIPPED | OUTPATIENT
Start: 2023-02-15

## 2023-02-15 RX ORDER — SERTRALINE HYDROCHLORIDE 100 MG/1
100 TABLET, FILM COATED ORAL DAILY
Qty: 30 TABLET | Refills: 3 | Status: SHIPPED | OUTPATIENT
Start: 2023-02-15

## 2023-02-15 NOTE — PROGRESS NOTES
Chief Complaint   Patient presents with    Medication Management     Visit Vitals  /64 (BP 1 Location: Left upper arm, BP Patient Position: Sitting, BP Cuff Size: Large adult)   Pulse 81   Temp 98.2 °F (36.8 °C) (Oral)   Resp 16   Ht 5' 9\" (1.753 m)   Wt 117.6 kg (259 lb 3.2 oz)   SpO2 98%   BMI 38.28 kg/m²     3 most recent PHQ Screens 2/15/2023   Little interest or pleasure in doing things Several days   Feeling down, depressed, irritable, or hopeless Several days   Total Score PHQ 2 2   Trouble falling or staying asleep, or sleeping too much More than half the days   Feeling tired or having little energy More than half the days   Poor appetite, weight loss, or overeating More than half the days   Feeling bad about yourself - or that you are a failure or have let yourself or your family down Not at all   Trouble concentrating on things such as school, work, reading, or watching TV Not at all   Moving or speaking so slowly that other people could have noticed; or the opposite being so fidgety that others notice Several days   Thoughts of being better off dead, or hurting yourself in some way Not at all   PHQ 9 Score 9   How difficult have these problems made it for you to do your work, take care of your home and get along with others Not difficult at all     1. Have you been to the ER, urgent care clinic since your last visit? Hospitalized since your last visit? [facility]. Heart Surgery. 11.2022    2. Have you seen or consulted any other health care providers outside of the [address] [place] [address] since your last visit? Include any pap smears or colon screening.  No

## 2023-02-15 NOTE — PROGRESS NOTES
CHIEF COMPLAINT:  Danica Cardoso is a 36 y.o. male and was seen today for follow-up of psychiatric condition and psychotropic medication management. HPI:    Yue Draper reports the following psychiatric symptoms by hx:  depression and anxiety. Overall symptoms have been present for years. Currently symptoms are is of moderate/high severity. Anxiety is exacerbated. The symptoms occur intermittently. Pt reports medications are effective, but symptoms are exacerbated. Precipitating factors: major heart surgery and hospitalization. Alleviating factors: son and mother. Met with pt for appt today  to review current treatment plan. FAMILY/SOCIAL HX:   Psychosocial Stressors      REVIEW OF SYSTEMS:  Psychiatric symptoms being monitored for:  depression, anxiety   Appetite:good   Sleep: poor with DIS (difficulty initiating sleep)   Neuro: denies    Cardio: some chest soreness       There were no vitals taken for this visit. Visit Vitals  /64 (BP 1 Location: Left upper arm, BP Patient Position: Sitting, BP Cuff Size: Large adult)   Pulse 81   Temp 98.2 °F (36.8 °C) (Oral)   Resp 16   Ht 5' 9\" (1.753 m)   Wt 117.6 kg (259 lb 3.2 oz)   SpO2 98%   BMI 38.28 kg/m²       Side Effects:  none    MENTAL STATUS EXAM:   Sensorium  oriented to time, place and person   Relations cooperative   Appearance:  age appropriate and within normal Limits   Motor Behavior:  within normal limits   Speech:  normal pitch and normal volume   Thought Process: within normal limits   Thought Content free of delusions and free of hallucinations   Suicidal ideations none   Homicidal ideations none   Mood:  Anxious    Affect:  mood-congruent   Memory recent  adequate   Memory remote:  adequate   Concentration:  adequate   Abstraction:  abstract   Insight:  good   Reliability good   Judgment:  good     MEDICAL DECISION MAKING:  Problems addressed today:    ICD-10-CM ICD-9-CM    1.  Adjustment disorder with mixed emotional features  F43.29 309.9       2. Insomnia disorder with non-sleep disorder mental comorbidity  G47.00 780.52       3. CESAR (generalized anxiety disorder)  F41.1 300.02 sertraline (ZOLOFT) 100 mg tablet      LORazepam (ATIVAN) 1 mg tablet      4. PTSD (post-traumatic stress disorder)  F43.10 309.81             Assessment:   Maeve Romo is responding to treatment. Symptoms are slightly exacerbated patient reports having a fear of no knowing when he will die. Recommend patient start EMDR. Referred to [facility]. Discussed current medications and dosages. No changes . Patient understands benzo safety guidelines. Reviewed treatment goals and target symptoms to monitor for. Plan:   1. Current Outpatient Medications   Medication Sig Dispense Refill    sertraline (ZOLOFT) 100 mg tablet Take 1 Tablet by mouth daily. 30 Tablet 3    LORazepam (ATIVAN) 1 mg tablet Take 1 Tablet by mouth every eight (8) hours as needed for Anxiety. Max Daily Amount: 3 mg. 90 Tablet 2    sertraline (ZOLOFT) 50 mg tablet Take 1 tablet by mouth once daily 30 Tablet 0    insulin glargine U-300 conc (Toujeo Max U-300 SoloStar) 300 unit/mL (3 mL) inpn Take 85 units twice a day 45 mL 5    insulin regular (HumuLIN R Regular U-100 Insuln) 100 unit/mL injection INJECT 35 UNITS SUBCUTANEOUSLY THREE TIMES DAILY WITH EACH MEAL, plus correction. Max daily dose of 150 units (Patient taking differently: INJECT 30 UNITS SUBCUTANEOUSLY THREE TIMES DAILY WITH EACH MEAL, plus correction. Max daily dose of 150 units or 10 units if eats small amount of carbs, plus correction. If blood sugar is over 150, will use correction scale only.) 50 mL 5    Blood-Glucose Meter,Continuous (Dexcom G6 ) misc Use to read blood sugars. DX: E10.42 1 Each 0    Blood-Glucose Transmitter (Dexcom G6 Transmitter) cole Use on sensor to read blood sugar. Change every 90 days. DX: E10.42 1 Each 3    Blood-Glucose Sensor (Dexcom G6 Sensor) cole Use to read blood sugars. Change every 10 days.  DX: E10.42 3 Each 11    lidocaine (XYLOCAINE) 2 % solution Take 15 mL by mouth as needed for Pain. 1 Each 0    Blood-Glucose Sensor (FreeStyle Mikal 3 Sensor) cole Change sensor every 14 days (Patient not taking: Reported on 1/27/2023) 2 Each 5    levothyroxine (SYNTHROID) 125 mcg tablet Take 1 Tablet by mouth Daily (before breakfast). 90 Tablet 3    metFORMIN (GLUCOPHAGE) 500 mg tablet TAKE 1 TABLET BY MOUTH TWICE DAILY WITH MEALS 180 Tablet 3    alum-mag hydroxide-simeth (MYLANTA) 200-200-20 mg/5 mL susp Take 30 mL by mouth every four (4) hours as needed for Indigestion. 150 mL 0    busPIRone (BUSPAR) 15 mg tablet Take 1 Tablet by mouth three (3) times daily. 90 Tablet 2    Insulin Needles, Disposable, 32 gauge x 5/32\" ndle 5 shots per day (dispense whatever brand is covered by his insurance) 500 Pen Needle 3    insulin U-500 syringe-needle (BD Insulin Syringe U-500) 1/2 mL 31 gauge x 15/64\" syrg Three shots per day. 300 Syringe 3    butalbital-acetaminophen-caffeine (FIORICET, ESGIC) -40 mg per tablet Take 1 Tablet by mouth every six (6) hours as needed for Headache. Indications: a migraine headache 10 Tablet 0    esomeprazole (NEXIUM) 40 mg capsule TAKE 1 CAPSULE BY MOUTH ONCE DAILY BEFORE BREAKFAST DO NOT OPEN CAPSULE      metoprolol succinate (TOPROL-XL) 25 mg XL tablet Take 25 mg by mouth two (2) times a day. rosuvastatin (CRESTOR) 40 mg tablet Take 40 mg by mouth daily. famotidine (PEPCID) 40 mg tablet Take 1 Tablet by mouth daily. 30 Tablet 3    clopidogreL (PLAVIX) 75 mg tab Take 1 Tablet by mouth daily. 30 Tablet 12    cholecalciferol (Vitamin D3) (5000 Units/125 mcg) tab tablet Take 1 Tab by mouth daily. 90 Tab 1    Insulin Syringe-Needle U-100 (BD Insulin Syringe) 1 mL 25 x 1\" syrg Use for drawing up/injecting testosterone once weekly. 100 Each 3    aspirin delayed-release 81 mg tablet Take 1 Tab by mouth daily. 30 Tab 0          medication changes made today: No changes made today     2. Counseling and coordination of care including instructions for treatment, risks/benefits, risk factor reduction and patient/family education. He agrees with the plan. Patient instructed to call with any side effects, questions or issues.             2/15/2023  Shaila Coy, NP

## 2023-02-15 NOTE — TELEPHONE ENCOUNTER
Spoke with patient. He states that he is taking 86 units twice a day, but his prescription says 172 units per day. Advised that a new Rx was sent to [facility] on 01/27/23 with 85 units twice a day. He said that he will pick it up. He states that he is \"not worried about the few extra units\" and to not send in a new Rx. He will  the Rx from 01/27/23.

## 2023-02-17 RX ORDER — INSULIN GLARGINE 300 U/ML
INJECTION, SOLUTION SUBCUTANEOUS
Qty: 3 ML | Refills: 0 | Status: SHIPPED | COMMUNITY
Start: 2023-02-17

## 2023-02-20 ENCOUNTER — DOCUMENTATION ONLY (OUTPATIENT)
Dept: ENDOCRINOLOGY | Age: 43
End: 2023-02-20

## 2023-02-20 ENCOUNTER — APPOINTMENT (OUTPATIENT)
Dept: CARDIAC REHAB | Age: 43
End: 2023-02-20
Payer: COMMERCIAL

## 2023-02-21 ENCOUNTER — HOSPITAL ENCOUNTER (OUTPATIENT)
Dept: CARDIAC REHAB | Age: 43
Discharge: HOME | End: 2023-02-21
Payer: COMMERCIAL

## 2023-02-21 VITALS — BODY MASS INDEX: 38.99 KG/M2 | WEIGHT: 264 LBS

## 2023-02-21 PROCEDURE — 93798 PHYS/QHP OP CAR RHAB W/ECG: CPT

## 2023-02-22 ENCOUNTER — APPOINTMENT (OUTPATIENT)
Dept: CARDIAC REHAB | Age: 43
End: 2023-02-22
Payer: COMMERCIAL

## 2023-02-24 ENCOUNTER — HOSPITAL ENCOUNTER (OUTPATIENT)
Dept: CARDIAC REHAB | Age: 43
Discharge: HOME | End: 2023-02-24
Payer: COMMERCIAL

## 2023-02-24 VITALS — BODY MASS INDEX: 38.54 KG/M2 | WEIGHT: 261 LBS

## 2023-02-24 PROCEDURE — 93798 PHYS/QHP OP CAR RHAB W/ECG: CPT

## 2023-02-27 ENCOUNTER — HOSPITAL ENCOUNTER (OUTPATIENT)
Dept: CARDIAC REHAB | Age: 43
Discharge: HOME | End: 2023-02-27
Payer: COMMERCIAL

## 2023-02-27 VITALS — WEIGHT: 262 LBS | BODY MASS INDEX: 38.69 KG/M2

## 2023-02-27 DIAGNOSIS — F41.1 GAD (GENERALIZED ANXIETY DISORDER): ICD-10-CM

## 2023-02-27 PROCEDURE — 93798 PHYS/QHP OP CAR RHAB W/ECG: CPT

## 2023-02-27 RX ORDER — SERTRALINE HYDROCHLORIDE 50 MG/1
TABLET, FILM COATED ORAL
Qty: 30 TABLET | Refills: 0 | Status: SHIPPED | OUTPATIENT
Start: 2023-02-27

## 2023-03-01 ENCOUNTER — APPOINTMENT (OUTPATIENT)
Dept: CARDIAC REHAB | Age: 43
End: 2023-03-01
Payer: COMMERCIAL

## 2023-03-01 ENCOUNTER — HOSPITAL ENCOUNTER (OUTPATIENT)
Dept: CARDIAC REHAB | Age: 43
Discharge: HOME | End: 2023-03-01
Payer: COMMERCIAL

## 2023-03-01 VITALS — WEIGHT: 262 LBS | BODY MASS INDEX: 38.69 KG/M2

## 2023-03-01 PROCEDURE — 93798 PHYS/QHP OP CAR RHAB W/ECG: CPT

## 2023-03-01 NOTE — CARDIO/PULMONARY
Patient developed chest pain while on the recumbent bike. 4/10  Rested and pain subsided. States he has been having some GI issues so believes it is related. Offered to take the patient to the emergency room and he politely declined. He will call his cardiologist and let him know. Also advised him if it wakes him up to call 911.

## 2023-03-09 ENCOUNTER — TELEPHONE (OUTPATIENT)
Dept: SLEEP MEDICINE | Age: 43
End: 2023-03-09

## 2023-03-09 ENCOUNTER — HOSPITAL ENCOUNTER (OUTPATIENT)
Dept: CARDIAC REHAB | Age: 43
Discharge: HOME | End: 2023-03-09
Payer: COMMERCIAL

## 2023-03-09 ENCOUNTER — VIRTUAL VISIT (OUTPATIENT)
Dept: SLEEP MEDICINE | Age: 43
End: 2023-03-09

## 2023-03-09 VITALS — WEIGHT: 265 LBS | BODY MASS INDEX: 39.13 KG/M2

## 2023-03-09 PROCEDURE — 93798 PHYS/QHP OP CAR RHAB W/ECG: CPT

## 2023-03-09 NOTE — PROGRESS NOTES
VV no show 3/9/2023    Link sent to mobile number, no response. Called patient at mobile number, no answer. Left VM with instructions for Doxy [facility] and office number to call to reschedule if needed. Link resent to mobile number. No other number. Chin Garcia (: 1980) is a 39 y.o. male, established patient, who was to be seen for positive airway pressure follow-up, he was last seen by me on 10/5/2022, previously seen by Dr. Jeb Garcia on 2022, prior notes and sleep tests reviewed in detail. Home sleep test 2021 showed AHI of 76.3/hr with a lowest SaO2 of 71%, duration of SaO2 < 88% 70 min. Subsequent titration 2021 showed CPAP failure with adequate treatment on BiPAP. AHI = 76.3(2021). On ResMed Bi - Level, Resmed :   Max IPAP 25 cmH2O; Min EPAP 11 cmH2O; PS 4 cmH2O. Set up 2021. Device remote data shows last used 2022. He had noted tolerance issues at prior visits after he had a panic attack while wearing the mask last year. We had discussed trial of new mask and he was fitted with a F&P Flakito FFM in sleep office for him to trial at home, starting with brief periods and removing if he felt anxious. We had also discussed alternatives to PAP therapy which are limited due to the severity of his apnea and his current weight. Both oral appliance and inspire were reviewed as alternatives given his BiPAP intolerance issues. Visit to be rescheduled - in lab titration may be needed to help with mask desensitization. -- Ingrid Weinberg NP, [facility]  23  This encounter was created in error - please disregard.

## 2023-03-10 ENCOUNTER — OFFICE VISIT (OUTPATIENT)
Dept: INTERNAL MEDICINE CLINIC | Age: 43
End: 2023-03-10

## 2023-03-10 VITALS
TEMPERATURE: 98.1 F | RESPIRATION RATE: 16 BRPM | HEIGHT: 69 IN | SYSTOLIC BLOOD PRESSURE: 131 MMHG | OXYGEN SATURATION: 99 % | DIASTOLIC BLOOD PRESSURE: 74 MMHG | BODY MASS INDEX: 39.69 KG/M2 | HEART RATE: 63 BPM | WEIGHT: 268 LBS

## 2023-03-10 DIAGNOSIS — H61.22 IMPACTED CERUMEN OF LEFT EAR: Primary | ICD-10-CM

## 2023-03-10 DIAGNOSIS — R21 RASH: ICD-10-CM

## 2023-03-10 DIAGNOSIS — R60.9 EDEMA, UNSPECIFIED TYPE: ICD-10-CM

## 2023-03-10 DIAGNOSIS — R14.0 BLOATING: ICD-10-CM

## 2023-03-10 RX ORDER — CHOLECALCIFEROL (VITAMIN D3) 50 MCG
CAPSULE ORAL
COMMUNITY

## 2023-03-10 RX ORDER — SIMETHICONE 80 MG
80 TABLET,CHEWABLE ORAL
Qty: 30 TABLET | Refills: 2 | Status: SHIPPED | OUTPATIENT
Start: 2023-03-10

## 2023-03-10 RX ORDER — DESONIDE 0.5 MG/G
CREAM TOPICAL 2 TIMES DAILY
Qty: 15 G | Refills: 0 | Status: SHIPPED | OUTPATIENT
Start: 2023-03-10

## 2023-03-10 NOTE — PROGRESS NOTES
Good Help to Those in [facility]   Internal Medicine  [address] [facility] [address], [address] [facility]  [address]  [place], [address] [facility]  [phone]      Primary Care Visit Note    Assessment/Plan:     Diagnoses and all orders for this visit:    1. Impacted cerumen of left ear - pt already has appt with ENT    2. Rash  -     desonide (TRIDESILON) 0.05 % cream; Apply  to affected area two (2) times a day. 3. Bloating  -     psyllium (METAMUCIL) pack (sugar free) packet; Take 1 Packet by mouth daily. -     simethicone (MYLICON) 80 mg chewable tablet; Take 1 Tablet by mouth every six (6) hours as needed for Flatulence. 4. LE Edema - increased since stopping lasix in Dec. Will recheck Na today, if acceptable, will start lasix 20 mg daily. Pt has follow up w cardiology next month as well.   -     NT-PRO BNP; Future  -     METABOLIC PANEL, COMPREHENSIVE; Future        Simeon Pulse, MD    CC:     Chief Complaint   Patient presents with    Abdominal Pain    Ankle swelling    Hand Swelling       HPI:     Lm Mercado is a 39 y.o. male who presents for evaluation of bloating and LE swelling. He was placed on lasix after the surgery and doing well but then stopped in Dec    Patient was in the 250's in January and now up to 268 lbs. Pt saw cardiologist in mid to end of Feb.     Patient has been undergoing cardiopulmonary rehab. After eating he is getting a lot of bloating. He started probiotics. He has been taking fiber. After he has a BM he feels better. He has a lot of variation in number of Bms. He has not had bloody stool. No change in chronic chest pain since surgery. He feels his exercise tolerance is getting better since starting rehab. No nausea or vomiting. Pt goes to Dr. Blayne Dejesus for cards. ROS:   All 10 point review of systems negative except those mentioned in the HPI. Past Medical History:      Active Ambulatory Problems     Diagnosis Date Noted    DM (diabetes mellitus) ([facility] 08/23/2014    Acquired hypothyroidism 11/28/2016    Essential hypertension 11/28/2016    Fibromyalgia 11/28/2016    Microalbuminuria 11/28/2016    Anxiety 11/28/2016    Migraine without aura 11/28/2016    Hypertriglyceridemia 12/05/2016    Severe obesity ([facility] 05/15/2019    Transient ischemic attack involving left internal carotid artery 07/15/2019    Chest pain 11/28/2019    Unstable angina ([facility] 11/28/2019    Coronary artery disease involving native coronary artery of native heart with unstable angina pectoris ([facility] 12/03/2019    Type 2 diabetes with nephropathy ([facility] 01/27/2020    Dysthymia 03/24/2020    Adjustment disorder with mixed emotional features 03/24/2020    Anxiety disorder due to general medical condition with panic attack 03/24/2020    Insomnia disorder with non-sleep disorder mental comorbidity 03/24/2020    Dizzy spells 08/23/2021    Multiple lacunar infarcts ([facility] 08/23/2021    Cervical spondylosis with radiculopathy 12/03/2021    Low back pain 01/14/2022    Corneal abrasion, right 01/19/2022    Stable proliferative diabetic retinopathy of both eyes associated with type 1 diabetes mellitus ([facility] 01/19/2022    Bilateral carotid artery stenosis 06/13/2022    Acute non-Q wave non-ST elevation myocardial infarction (NSTEMI) ([facility] 11/15/2022    CAD (coronary artery disease) 11/22/2022    S/P CABG x 1 11/22/2022    S/P AVR (aortic valve replacement) and aortoplasty 11/22/2022     Resolved Ambulatory Problems     Diagnosis Date Noted    Acute pancreatitis 08/23/2014    Aortic stenosis 11/22/2022     Past Medical History:   Diagnosis Date    Anxiety and depression     Bleeding of eye, left     Chronic headaches 2007    COVID-19 12/20/2020    Diabetes type 1, uncontrolled     GERD (gastroesophageal reflux disease)     Hypercholesterolemia     Hypertension     Hypothyroidism     MI (myocardial infarction) ([facility]     WALLY on CPAP           Current Medications:     Current Outpatient Medications: B.animalis,bifid,infantis,long (Probiotic 4X) 10-15 mg TbEC, Take  by mouth., Disp: , Rfl:     psyllium (METAMUCIL) pack (sugar free) packet, Take 1 Packet by mouth daily. , Disp: 30 Packet, Rfl: 2    simethicone (MYLICON) 80 mg chewable tablet, Take 1 Tablet by mouth every six (6) hours as needed for Flatulence. , Disp: 30 Tablet, Rfl: 2    desonide (TRIDESILON) 0.05 % cream, Apply  to affected area two (2) times a day., Disp: 15 g, Rfl: 0    sertraline (ZOLOFT) 50 mg tablet, Take 1 tablet by mouth once daily, Disp: 30 Tablet, Rfl: 0    insulin glargine U-300 conc (Toujeo Max U-300 SoloStar) 300 unit/mL (3 mL) inpn, Take 86 units twice a day  DISPENSED 2 PENS, Disp: 3 mL, Rfl: 0    sertraline (ZOLOFT) 100 mg tablet, Take 1 Tablet by mouth daily. , Disp: 30 Tablet, Rfl: 3    LORazepam (ATIVAN) 1 mg tablet, Take 1 Tablet by mouth every eight (8) hours as needed for Anxiety. Max Daily Amount: 3 mg., Disp: 90 Tablet, Rfl: 2    insulin regular (HumuLIN R Regular U-100 Insuln) 100 unit/mL injection, INJECT 35 UNITS SUBCUTANEOUSLY THREE TIMES DAILY WITH EACH MEAL, plus correction. Max daily dose of 150 units (Patient taking differently: INJECT 30 UNITS SUBCUTANEOUSLY THREE TIMES DAILY WITH EACH MEAL, plus correction. Max daily dose of 150 units or 10 units if eats small amount of carbs, plus correction. If blood sugar is over 150, will use correction scale only.), Disp: 50 mL, Rfl: 5    Blood-Glucose Meter,Continuous (Dexcom G6 ) misc, Use to read blood sugars. DX: E10.42, Disp: 1 Each, Rfl: 0    Blood-Glucose Transmitter (Dexcom G6 Transmitter) cole, Use on sensor to read blood sugar. Change every 90 days. DX: E10.42, Disp: 1 Each, Rfl: 3    Blood-Glucose Sensor (Dexcom G6 Sensor) cole, Use to read blood sugars. Change every 10 days.  DX: E10.42, Disp: 3 Each, Rfl: 11    lidocaine (XYLOCAINE) 2 % solution, Take 15 mL by mouth as needed for Pain., Disp: 1 Each, Rfl: 0    levothyroxine (SYNTHROID) 125 mcg tablet, Take 1 Tablet by mouth Daily (before breakfast). , Disp: 90 Tablet, Rfl: 3    metFORMIN (GLUCOPHAGE) 500 mg tablet, TAKE 1 TABLET BY MOUTH TWICE DAILY WITH MEALS, Disp: 180 Tablet, Rfl: 3    alum-mag hydroxide-simeth (MYLANTA) 200-200-20 mg/5 mL susp, Take 30 mL by mouth every four (4) hours as needed for Indigestion. , Disp: 150 mL, Rfl: 0    busPIRone (BUSPAR) 15 mg tablet, Take 1 Tablet by mouth three (3) times daily. , Disp: 90 Tablet, Rfl: 2    Insulin Needles, Disposable, 32 gauge x 5/32\" ndle, 5 shots per day (dispense whatever brand is covered by his insurance), Disp: 500 Pen Needle, Rfl: 3    insulin U-500 syringe-needle (BD Insulin Syringe U-500) 1/2 mL 31 gauge x 15/64\" syrg, Three shots per day., Disp: 300 Syringe, Rfl: 3    butalbital-acetaminophen-caffeine (FIORICET, ESGIC) -40 mg per tablet, Take 1 Tablet by mouth every six (6) hours as needed for Headache. Indications: a migraine headache, Disp: 10 Tablet, Rfl: 0    esomeprazole (NEXIUM) 40 mg capsule, TAKE 1 CAPSULE BY MOUTH ONCE DAILY BEFORE BREAKFAST DO NOT OPEN CAPSULE, Disp: , Rfl:     metoprolol succinate (TOPROL-XL) 25 mg XL tablet, Take 25 mg by mouth two (2) times a day., Disp: , Rfl:     rosuvastatin (CRESTOR) 40 mg tablet, Take 40 mg by mouth daily. , Disp: , Rfl:     famotidine (PEPCID) 40 mg tablet, Take 1 Tablet by mouth daily. , Disp: 30 Tablet, Rfl: 3    clopidogreL (PLAVIX) 75 mg tab, Take 1 Tablet by mouth daily. , Disp: 30 Tablet, Rfl: 12    cholecalciferol (Vitamin D3) (5000 Units/125 mcg) tab tablet, Take 1 Tab by mouth daily. , Disp: 90 Tab, Rfl: 1    Insulin Syringe-Needle U-100 (BD Insulin Syringe) 1 mL 25 x 1\" syrg, Use for drawing up/injecting testosterone once weekly. , Disp: 100 Each, Rfl: 3    aspirin delayed-release 81 mg tablet, Take 1 Tab by mouth daily. , Disp: 30 Tab, Rfl: 0    Blood-Glucose Sensor (FreeStyle Mikal 3 Sensor) ocle, Change sensor every 14 days (Patient not taking: No sig reported), Disp: 2 Each, Rfl: 5      Past Surgical History:     Past Surgical History:   Procedure Laterality Date    HX CARPAL TUNNEL RELEASE Left     HX CARPAL TUNNEL RELEASE Right     CTE trigger thumb and index finger    HX HEART CATHETERIZATION      HX HEART VALVE SURGERY  2022    HX HEENT      HX LAP CHOLECYSTECTOMY  2014    Dr Mirza Newberry    HX WISDOM TEETH EXTRACTION           Family History:     Family History   Problem Relation Age of Onset    Diabetes Mother     Hypertension Mother     Heart Disease Father     Cancer Father     Diabetes Father     Other Father         MAC    Diabetes Paternal Aunt     Diabetes Maternal Grandmother     Thyroid Cancer Maternal Grandmother     Kidney Disease Maternal Grandmother     Arthritis Maternal Grandmother     Heart Disease Maternal Grandfather     High Cholesterol Maternal Grandfather     Hypertension Maternal Grandfather     Diabetes Paternal Grandmother     Hemophilia Paternal Grandfather          Social History:     Social History     Socioeconomic History    Marital status:      Spouse name: Not on file    Number of children: Not on file    Years of education: Not on file    Highest education level: High school graduate   Occupational History    Not on file   Tobacco Use    Smoking status: Former     Packs/day: 0.00     Years: 14.00     Pack years: 0.00     Types: Cigarettes     Quit date: 2014     Years since quittin.1    Smokeless tobacco: Never   Vaping Use    Vaping Use: Never used   Substance and Sexual Activity    Alcohol use: No    Drug use: No    Sexual activity: Not Currently   Other Topics Concern     Service No    Blood Transfusions No    Caffeine Concern No    Occupational Exposure No    Hobby Hazards No    Sleep Concern Yes    Stress Concern No    Weight Concern Yes    Special Diet Yes    Back Care Yes    Exercise No    Bike Helmet No    Seat Belt Yes    Self-Exams No   Social History Narrative    Not on file     Social Determinants of Health     Financial Resource Strain: Low Risk     Difficulty of Paying Living Expenses: Not hard at all   Food Insecurity: No Food Insecurity    Worried About Running Out of Food in the Last Year: Never true    Ran Out of Food in the Last Year: Never true   Transportation Needs: Not on file   Physical Activity: Not on file   Stress: Not on file   Social Connections: Not on file   Intimate Partner Violence: Not on file   Housing Stability: Not on file            Visit Vitals  /74 (BP 1 Location: Right upper arm, BP Patient Position: Sitting, BP Cuff Size: Adult)   Pulse 63   Temp 98.1 °F (36.7 °C) (Temporal)   Resp 16   Ht 5' 9\" (1.753 m)   Wt 268 lb (121.6 kg)   SpO2 99%   BMI 39.58 kg/m²       Physical Exam:   General - Well appearing  HEENT - EOMI, non-icteric sclera. L ear canal with impacted cerumen. Pulm - clear to auscultation bilaterally  Cardio - RRR, normal S1 S2, no murmur  Abd - soft, nontender, nabs  Extrem - 1+ LE edema  Neuro-  Alert and oriented, No focal deficits  Skin - dry, scaly plaques near axilla. Labs/Imaging:     Labs and imaging reviewed by me and significant for:    Lab Results   Component Value Date/Time    Sodium 132 (L) 12/09/2022 03:34 AM    Potassium 5.1 12/09/2022 03:34 AM    Chloride 99 12/09/2022 03:34 AM    CO2 28 12/09/2022 03:34 AM    Anion gap 5 12/09/2022 03:34 AM    Glucose 365 (H) 12/09/2022 03:34 AM    BUN 22 (H) 12/09/2022 03:34 AM    Creatinine 1.12 12/09/2022 03:34 AM    BUN/Creatinine ratio 20 12/09/2022 03:34 AM    GFR est AA >60 09/14/2022 09:54 AM    GFR est non-AA >60 09/14/2022 09:54 AM    Calcium 8.9 12/09/2022 03:34 AM    Bilirubin, total 0.3 12/23/2022 10:45 AM    Alk.  phosphatase 159 (H) 12/23/2022 10:45 AM    Protein, total 6.4 12/23/2022 10:45 AM    Albumin 4.0 12/23/2022 10:45 AM    Globulin 3.5 12/09/2022 03:34 AM    A-G Ratio 0.9 (L) 12/09/2022 03:34 AM    ALT (SGPT) 61 (H) 12/23/2022 10:45 AM    AST (SGOT) 28 12/23/2022 10:45 AM     Lab Results   Component Value Date/Time    WBC 8.5 12/09/2022 03:34 AM    HGB 9.5 (L) 12/09/2022 03:34 AM    HCT 31.0 (L) 12/09/2022 03:34 AM    PLATELET 946 17/48/3017 03:34 AM    MCV 85.6 12/09/2022 03:34 AM     Lab Results   Component Value Date/Time    Hemoglobin A1c 9.0 (H) 11/16/2022 12:27 PM    Hemoglobin A1c (POC) 8.1 01/27/2023 12:40 PM    Hemoglobin A1c, External 10.1 04/26/2022 12:00 AM         Please note that this dictation was completed in part with Sw[employer], the [employer] voice recognition software. Quite often unanticipated grammatical, syntax, homophones, and other interpretive errors are inadvertently transcribed by the computer software. Please disregard these errors. Please excuse any errors that have escaped final proofreading.

## 2023-03-10 NOTE — PROGRESS NOTES
1. \"Have you been to the ER, urgent care clinic since your last visit? Hospitalized since your last visit? \" No    2. \"Have you seen or consulted any other health care providers outside of the [address] [place] [address] since your last visit? \" No     3. For patients aged 39-70: Has the patient had a colonoscopy / FIT/ Cologuard?  NA - based on age

## 2023-03-11 LAB
ALBUMIN SERPL-MCNC: 4 G/DL (ref 3.5–5)
ALBUMIN/GLOB SERPL: 1.2 (ref 1.1–2.2)
ALP SERPL-CCNC: 95 U/L (ref 45–117)
ALT SERPL-CCNC: 47 U/L (ref 12–78)
ANION GAP SERPL CALC-SCNC: 7 MMOL/L (ref 5–15)
AST SERPL-CCNC: 23 U/L (ref 15–37)
BILIRUB SERPL-MCNC: 0.4 MG/DL (ref 0.2–1)
BNP SERPL-MCNC: 150 PG/ML
BUN SERPL-MCNC: 18 MG/DL (ref 6–20)
BUN/CREAT SERPL: 11 (ref 12–20)
CALCIUM SERPL-MCNC: 9.2 MG/DL (ref 8.5–10.1)
CHLORIDE SERPL-SCNC: 103 MMOL/L (ref 97–108)
CO2 SERPL-SCNC: 27 MMOL/L (ref 21–32)
COMMENT, HOLDF: NORMAL
CREAT SERPL-MCNC: 1.68 MG/DL (ref 0.7–1.3)
GLOBULIN SER CALC-MCNC: 3.4 G/DL (ref 2–4)
GLUCOSE SERPL-MCNC: 210 MG/DL (ref 65–100)
POTASSIUM SERPL-SCNC: 4.4 MMOL/L (ref 3.5–5.1)
PROT SERPL-MCNC: 7.4 G/DL (ref 6.4–8.2)
SAMPLES BEING HELD,HOLD: NORMAL
SODIUM SERPL-SCNC: 137 MMOL/L (ref 136–145)

## 2023-03-13 ENCOUNTER — HOSPITAL ENCOUNTER (OUTPATIENT)
Dept: CARDIAC REHAB | Age: 43
Discharge: HOME | End: 2023-03-13
Payer: COMMERCIAL

## 2023-03-13 VITALS — BODY MASS INDEX: 39.43 KG/M2 | WEIGHT: 267 LBS

## 2023-03-13 PROCEDURE — 93798 PHYS/QHP OP CAR RHAB W/ECG: CPT

## 2023-03-14 ENCOUNTER — OFFICE VISIT (OUTPATIENT)
Dept: ENDOCRINOLOGY | Age: 43
End: 2023-03-14
Payer: COMMERCIAL

## 2023-03-14 VITALS
BODY MASS INDEX: 39.55 KG/M2 | WEIGHT: 267 LBS | DIASTOLIC BLOOD PRESSURE: 62 MMHG | HEIGHT: 69 IN | HEART RATE: 76 BPM | SYSTOLIC BLOOD PRESSURE: 119 MMHG

## 2023-03-14 DIAGNOSIS — E78.2 MIXED HYPERLIPIDEMIA: ICD-10-CM

## 2023-03-14 DIAGNOSIS — E03.9 ACQUIRED HYPOTHYROIDISM: ICD-10-CM

## 2023-03-14 DIAGNOSIS — E10.42 TYPE 1 DIABETES MELLITUS WITH DIABETIC POLYNEUROPATHY (HCC): Primary | ICD-10-CM

## 2023-03-14 DIAGNOSIS — N18.31 STAGE 3A CHRONIC KIDNEY DISEASE (HCC): ICD-10-CM

## 2023-03-14 PROCEDURE — 3074F SYST BP LT 130 MM HG: CPT | Performed by: INTERNAL MEDICINE

## 2023-03-14 PROCEDURE — 3078F DIAST BP <80 MM HG: CPT | Performed by: INTERNAL MEDICINE

## 2023-03-14 PROCEDURE — 99215 OFFICE O/P EST HI 40 MIN: CPT | Performed by: INTERNAL MEDICINE

## 2023-03-14 PROCEDURE — 3052F HG A1C>EQUAL 8.0%<EQUAL 9.0%: CPT | Performed by: INTERNAL MEDICINE

## 2023-03-14 PROCEDURE — 95251 CONT GLUC MNTR ANALYSIS I&R: CPT | Performed by: INTERNAL MEDICINE

## 2023-03-14 RX ORDER — POLYETHYLENE GLYCOL 3350 17 G/17G
17 POWDER, FOR SOLUTION ORAL DAILY
COMMUNITY

## 2023-03-14 NOTE — PATIENT INSTRUCTIONS
1) Toujeo 90 units in the morning and 90 units at bedtime. 2) Novolin R: 45 units with each meal, plus correction.     150-199 5 additional units  200-249 10 additional units  250-299 15 additional units  300-349 20 additional units  350-399 25 additional units  400-449 30 additional units  450-499 35 additional units  500-549 40 additional units  550+ 45 additional units

## 2023-03-14 NOTE — LETTER
3/14/2023    Patient: Dudley West   YOB: 1980   Date of Visit: 3/14/2023     Lev Whiting MD  [address] [facility] [address]  [address] [id number]  [facility] [facility] [address]    Dear Lev Whiting MD,      Thank you for referring Mr. Kenia Burr to [address] [place] [address] for evaluation. My notes for this consultation are attached. If you have questions, please do not hesitate to call me. I look forward to following your patient along with you.       Sincerely,    Rickey Soliz MD

## 2023-03-14 NOTE — PROGRESS NOTES
Chief Complaint   Patient presents with    Diabetes    Thyroid Problem     Pcp and pharmacy verified     History of Present Illness: Radha Rob is a 39 y.o. male here for follow up of diabetes. 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 Garcia Begum of Cardiology. Pt had been experiencing more issues of CP and presented to the ED on 11/15/22 and was found to have multi-vessel disease. He was taken to the OR for CORONARY ARTERY BYPASS GRAFT  X 1 WITH LEFT INTERNAL MAMMARY ARTERY to LAD AORTIC VALVE REPLACEMENT WITH MEYER 25MM INSPIRIS RESILIA TISSUE VALVE   REPAIR OF ASCENDING AORTIC ANEURYSM with 26mm hemashield graft. He was in the ICU on a vent for several days and was eventually discharged home on 10/9/22. He called me 10/10/22 and he noted that his BGs were running in the 300-400s. I instructed him to take Toujeo 140 units once per day (or he can take 70 units BID) and to take Novolin R 30 units with each meal plus 5:50 correction for BG >150. At our last visit in January 2023 his A1C had improved down to 8.1%. Based on his BG readings I instructed him to increase his Toujeo to 85 units in the AM and 85 units HS and to continue the Novolin R 35 units TID/AC plus 5:50 correction for BG > 150. \"My sugars will go good and then I get a low and the it is all over the place for awhile. \"    Pt is using the DexCom CGM to monitor his BGs. I reviewed his CGM readings. Per his DexCom CGM his BGs are running consistently in the 200-300s range. Pt is taking Toujeo 90 units in the AM and 90 units HS and R 36 units with each meal plus correction. \"I have been taking between 40-60 units with each meal.\"    Pt has been healing well from his surgery, but he notes he has developed a rash at the cath site, on his chest and axillae.     He has been testing his BPs and notes that his pressures have been running in the 110s-120s/60s at home. His BP today was 119/62. For his issues of vertigo, and dizziness he is followed by Dr. Anaya Aguayo of Neurology. \"I need to see him in follow up. \"    Pt is still following with psychiatry for anxiety and depression. Pt has hx of neuropathy. No hx of nephropathy. Pt has hx of hypothyroidism, since the age of 11-9 years old. He is currently taking 112mcg daily. At our visit in October 2019 we tested his Thyroid labs, which were normal, but he had a very low Testosterone of 199 on 10/22/19 and repeat was 240 in 10/28/19. His FSH, LH and prolactin were unremarkable. Pt opted to  Not start Clomid 50mg every other day. At our last visit we again ordered the clomid. Pt notes that he has been taking the Clomid 50mg every other day. He notes he is still having issues of ED and poor libido so he stopped taking the Clomid because of the cost.  He wanted to discuss trying an alternative agent. He was taken off the the testosterone. He is not currently taking the topical testosterone. \"I am using it once in a blood moon. I have not seen any benefit from it. \"  When he was using two pumps per day. Pt notes he was changed from CPAP to BiPap, but he had a panic attack and the BiPap made it worse \"so I have not used it since. \" \"I am trying to get healed from the surgery and I will see the sleep doctor again. \"    Pt is still taking his LT4 125mcg daily. Current Outpatient Medications   Medication Sig    polyethylene glycol (Miralax) 17 gram/dose powder Take 17 g by mouth daily. 2 capfuls daily    B.animalis,bifid,infantis,long (Probiotic 4X) 10-15 mg TbEC Take  by mouth. simethicone (MYLICON) 80 mg chewable tablet Take 1 Tablet by mouth every six (6) hours as needed for Flatulence.     sertraline (ZOLOFT) 50 mg tablet Take 1 tablet by mouth once daily    insulin glargine U-300 conc (Toujeo Max U-300 SoloStar) 300 unit/mL (3 mL) inpn Take 86 units twice a day  DISPENSED 2 PENS (Patient taking differently: Take 90 units twice a day)    sertraline (ZOLOFT) 100 mg tablet Take 1 Tablet by mouth daily. LORazepam (ATIVAN) 1 mg tablet Take 1 Tablet by mouth every eight (8) hours as needed for Anxiety. Max Daily Amount: 3 mg.    insulin regular (HumuLIN R Regular U-100 Insuln) 100 unit/mL injection INJECT 35 UNITS SUBCUTANEOUSLY THREE TIMES DAILY WITH EACH MEAL, plus correction. Max daily dose of 150 units (Patient taking differently: INJECT 36 UNITS SUBCUTANEOUSLY THREE TIMES DAILY WITH EACH MEAL, plus correction. Max daily dose of 150 units or 10 units if eats small amount of carbs, plus correction. If blood sugar is over 150, will use correction scale only.)    Blood-Glucose Meter,Continuous (Dexcom G6 ) misc Use to read blood sugars. DX: E10.42    Blood-Glucose Transmitter (Dexcom G6 Transmitter) cole Use on sensor to read blood sugar. Change every 90 days. DX: E10.42    Blood-Glucose Sensor (Dexcom G6 Sensor) cole Use to read blood sugars. Change every 10 days. DX: E10.42    levothyroxine (SYNTHROID) 125 mcg tablet Take 1 Tablet by mouth Daily (before breakfast). metFORMIN (GLUCOPHAGE) 500 mg tablet TAKE 1 TABLET BY MOUTH TWICE DAILY WITH MEALS    alum-mag hydroxide-simeth (MYLANTA) 200-200-20 mg/5 mL susp Take 30 mL by mouth every four (4) hours as needed for Indigestion. busPIRone (BUSPAR) 15 mg tablet Take 1 Tablet by mouth three (3) times daily. (Patient taking differently: Take 15 mg by mouth two (2) times a day.)    Insulin Needles, Disposable, 32 gauge x 5/32\" ndle 5 shots per day (dispense whatever brand is covered by his insurance)    insulin U-500 syringe-needle (BD Insulin Syringe U-500) 1/2 mL 31 gauge x 15/64\" syrg Three shots per day. butalbital-acetaminophen-caffeine (FIORICET, ESGIC) -40 mg per tablet Take 1 Tablet by mouth every six (6) hours as needed for Headache.  Indications: a migraine headache    esomeprazole (NEXIUM) 40 mg capsule TAKE 1 CAPSULE BY MOUTH ONCE DAILY BEFORE BREAKFAST DO NOT OPEN CAPSULE    metoprolol succinate (TOPROL-XL) 25 mg XL tablet Take 25 mg by mouth two (2) times a day. rosuvastatin (CRESTOR) 40 mg tablet Take 40 mg by mouth daily. famotidine (PEPCID) 40 mg tablet Take 1 Tablet by mouth daily. clopidogreL (PLAVIX) 75 mg tab Take 1 Tablet by mouth daily. cholecalciferol (Vitamin D3) (5000 Units/125 mcg) tab tablet Take 1 Tab by mouth daily. Insulin Syringe-Needle U-100 (BD Insulin Syringe) 1 mL 25 x 1\" syrg Use for drawing up/injecting testosterone once weekly. aspirin delayed-release 81 mg tablet Take 1 Tab by mouth daily. No current facility-administered medications for this visit. Allergies   Allergen Reactions    Gabapentin Swelling     Of feet    Morphine Nausea and Vomiting    Penicillin G Swelling    Percocet [Oxycodone-Acetaminophen] Hives and Nausea and Vomiting     Pt is allergic to Oxycodone, has previously tolerated Tylenol and Hydrocodone. Sulfa (Sulfonamide Antibiotics) Swelling    Tramadol Nausea Only     Nausea and headache       Review of Systems:  - Eyes: no blurry vision or double vision  - Cardiovascular: no chest pain  - Respiratory: no shortness of breath  - Musculoskeletal: no myalgias  - Neurological: no numbness/tingling in extremities    Physical Examination:  Blood pressure 119/62, pulse 76, height 5' 9\" (1.753 m), weight 267 lb (121.1 kg).   General: pleasant, no distress, good eye contact   Neck: no carotid bruits  Cardiovascular: regular, normal rate, nl s1 and s2, no m/r/g, 2+ DP pulses   Respiratory: clear bilaterally  Integumentary: no edema, no foot ulcers,   Psychiatric: normal mood and affect    Diabetic foot exam:     Left Foot:   Visual Exam: callous - present   Pulse DP: 2+ (normal)   Filament test: normal sensation    Vibratory sensation: normal      Right Foot:   Visual Exam: callous - present   Pulse DP: 2+ (normal)   Filament test: normal sensation Vibratory sensation: normal        Data Reviewed:   Component      Latest Ref Rng & Units 3/10/2023          10:06 AM   Sodium      136 - 145 mmol/L 137   Potassium      3.5 - 5.1 mmol/L 4.4   Chloride      97 - 108 mmol/L 103   CO2      21 - 32 mmol/L 27   Anion gap      5 - 15 mmol/L 7   Glucose      65 - 100 mg/dL 210 (H)   BUN      6 - 20 MG/DL 18   Creatinine      0.70 - 1.30 MG/DL 1.68 (H)   BUN/Creatinine ratio      12 - 20   11 (L)   eGFR      >60 ml/min/1.73m2 51 (L)   Calcium      8.5 - 10.1 MG/DL 9.2   Bilirubin, total      0.2 - 1.0 MG/DL 0.4   ALT      12 - 78 U/L 47   AST      15 - 37 U/L 23   Alk. phosphatase      45 - 117 U/L 95   Protein, total      6.4 - 8.2 g/dL 7.4   Albumin      3.5 - 5.0 g/dL 4.0   Globulin      2.0 - 4.0 g/dL 3.4   A-G Ratio      1.1 - 2.2   1.2       Assessment/Plan:   1) DM > His A1C was 8.1%, which is down from 9.1% in November 2022. Pt to continue the Toujeo 90 units BID and increase his Novolin R to 45 units with each meal plus 5:50 correction for BG > 150  Pt to check his BGs 4 times per day and mail his BG logs to me in 4 weeks. Because he is taking 5 dose of insulin per day and adjusting his insulin doses based on his BG readings he would benefit from a CGM. He was not able to tolerate the Hernandez 2 so I ordered the Hernandez 3 CGM. 2) Hypothyroidism > Pt to continue the LT4 125mcg daily. 3) Hypogonadism > Will have pt stay off the Testosterone till he is cleared by his Cardiologist.      4) HLD > Pt to continue the Rosuvastatin 40mg daily. His lipid panel in the hospital was at goal.      Pt voices understanding and agreement with the plan.   Pain noted and pt was recommended to call his PCP for further evaluation and treatment, as needed    RTC 4 months          Copy sent to:  Dr. Becky Pennington

## 2023-03-15 ENCOUNTER — APPOINTMENT (OUTPATIENT)
Dept: CARDIAC REHAB | Age: 43
End: 2023-03-15
Payer: COMMERCIAL

## 2023-03-16 ENCOUNTER — APPOINTMENT (OUTPATIENT)
Dept: CARDIAC REHAB | Age: 43
End: 2023-03-16
Payer: COMMERCIAL

## 2023-03-20 ENCOUNTER — HOSPITAL ENCOUNTER (OUTPATIENT)
Dept: CARDIAC REHAB | Age: 43
Discharge: HOME | End: 2023-03-20
Payer: COMMERCIAL

## 2023-03-20 VITALS — WEIGHT: 265 LBS | BODY MASS INDEX: 39.13 KG/M2

## 2023-03-20 PROCEDURE — 93798 PHYS/QHP OP CAR RHAB W/ECG: CPT

## 2023-03-24 RX ORDER — FUROSEMIDE 40 MG/1
40 TABLET ORAL DAILY
Qty: 30 TABLET | Refills: 1 | Status: SHIPPED | OUTPATIENT
Start: 2023-03-24

## 2023-03-24 NOTE — TELEPHONE ENCOUNTER
----- Message from Dwight Cervantes sent at 3/24/2023  3:36 PM EDT -----  Regarding: Medication  OK not a problem.  Can you call it into [facility] in [facility] for me please

## 2023-03-28 DIAGNOSIS — F41.1 GAD (GENERALIZED ANXIETY DISORDER): ICD-10-CM

## 2023-03-30 RX ORDER — PIOGLITAZONEHYDROCHLORIDE 15 MG/1
15 TABLET ORAL DAILY
Qty: 30 TABLET | Refills: 3 | Status: SHIPPED | OUTPATIENT
Start: 2023-03-30

## 2023-03-30 RX ORDER — SERTRALINE HYDROCHLORIDE 50 MG/1
TABLET, FILM COATED ORAL
Qty: 30 TABLET | Refills: 0 | Status: SHIPPED | OUTPATIENT
Start: 2023-03-30

## 2023-04-04 ENCOUNTER — HOSPITAL ENCOUNTER (OUTPATIENT)
Dept: CARDIAC REHAB | Age: 43
End: 2023-04-04
Payer: COMMERCIAL

## 2023-04-04 PROCEDURE — 93798 PHYS/QHP OP CAR RHAB W/ECG: CPT

## 2023-04-05 ENCOUNTER — HOSPITAL ENCOUNTER (OUTPATIENT)
Dept: CARDIAC REHAB | Age: 43
End: 2023-04-05
Payer: COMMERCIAL

## 2023-04-05 PROCEDURE — 93798 PHYS/QHP OP CAR RHAB W/ECG: CPT

## 2023-04-10 ENCOUNTER — APPOINTMENT (OUTPATIENT)
Dept: CARDIAC REHAB | Age: 43
End: 2023-04-10
Payer: COMMERCIAL

## 2023-05-05 ENCOUNTER — TELEPHONE (OUTPATIENT)
Dept: ENDOCRINOLOGY | Age: 43
End: 2023-05-05

## 2023-05-05 DIAGNOSIS — E03.9 ACQUIRED HYPOTHYROIDISM: Primary | ICD-10-CM

## 2023-05-08 ENCOUNTER — APPOINTMENT (OUTPATIENT)
Dept: CARDIAC REHAB | Age: 43
End: 2023-05-08

## 2023-05-08 ENCOUNTER — TELEPHONE (OUTPATIENT)
Age: 43
End: 2023-05-08

## 2023-05-08 DIAGNOSIS — E03.9 ACQUIRED HYPOTHYROIDISM: Primary | ICD-10-CM

## 2023-05-08 DIAGNOSIS — E03.9 ACQUIRED HYPOTHYROIDISM: ICD-10-CM

## 2023-05-08 NOTE — TELEPHONE ENCOUNTER
Patient Call  (Newest Message First)  Yuki Kam MD  You; Filmeon Enamorado 3 days ago   [address],   Please call Mr. Munguia and tell him that I will order the thyroid labs and he can go have them drawn next week. Thanks      Filemon Enamorado routed conversation to Yannick Duenas; Yuki Kam MD 3 days ago     Pete Bennett [phone]  Mendy CATALAN 3 days ago     Filemon Enamorado 3 days ago     BF  Patient called and stated that since he had heart surgery in November of last year he has been gaining weight. He stated that he does not eat a lot and currently he is on a fluid pill from his Cardiologist that he saw in March because he did have some fluid retention. He stated that if possible he would like to know if he could have his Thyroid check to see if that is the cause of his weight gain. I informed him that Dr. Lety Akers and Miguel Rojas is out of the office but I will forward this message to Miguel Rojas ( Dr. Cesar Brunner nurse) for her to call him on Monday. Patient voiced okay.

## 2023-05-08 NOTE — TELEPHONE ENCOUNTER
Patient has been advised and expressed understanding. He states he will go to [employer] this week.

## 2023-05-15 ENCOUNTER — TELEPHONE (OUTPATIENT)
Age: 43
End: 2023-05-15

## 2023-05-15 DIAGNOSIS — F41.1 GENERALIZED ANXIETY DISORDER: ICD-10-CM

## 2023-05-15 DIAGNOSIS — F43.29 ADJUSTMENT DISORDER WITH OTHER SYMPTOMS: Primary | ICD-10-CM

## 2023-05-15 RX ORDER — SERTRALINE HYDROCHLORIDE 100 MG/1
100 TABLET, FILM COATED ORAL DAILY
Qty: 30 TABLET | Refills: 5 | Status: SHIPPED | OUTPATIENT
Start: 2023-05-15

## 2023-05-15 NOTE — TELEPHONE ENCOUNTER
Medication Refill Request    Lonnie Abel is requesting a refill of the following medication(s):   sertraline (ZOLOFT) 50 MG tablet    Please send refill to:      [address], [address] [facility] [phone] [address] [facility]  [address] [address]  Phone: [phone] Fax: [phone]    Last Visit: 2/15/2023  Next Visit: 5/23/2023

## 2023-05-23 ENCOUNTER — OFFICE VISIT (OUTPATIENT)
Age: 43
End: 2023-05-23
Payer: COMMERCIAL

## 2023-05-23 VITALS
WEIGHT: 272.2 LBS | RESPIRATION RATE: 17 BRPM | DIASTOLIC BLOOD PRESSURE: 60 MMHG | SYSTOLIC BLOOD PRESSURE: 110 MMHG | HEART RATE: 82 BPM | BODY MASS INDEX: 40.2 KG/M2 | OXYGEN SATURATION: 98 % | TEMPERATURE: 97.1 F

## 2023-05-23 DIAGNOSIS — F43.29 ADJUSTMENT DISORDER WITH OTHER SYMPTOMS: Primary | ICD-10-CM

## 2023-05-23 DIAGNOSIS — G47.00 INSOMNIA, UNSPECIFIED TYPE: ICD-10-CM

## 2023-05-23 DIAGNOSIS — F41.1 GENERALIZED ANXIETY DISORDER: ICD-10-CM

## 2023-05-23 DIAGNOSIS — F41.9 ANXIETY: ICD-10-CM

## 2023-05-23 DIAGNOSIS — F43.10 POST-TRAUMATIC STRESS DISORDER, UNSPECIFIED: ICD-10-CM

## 2023-05-23 LAB
T4 FREE SERPL-MCNC: 0.94 NG/DL (ref 0.82–1.77)
TSH SERPL DL<=0.005 MIU/L-ACNC: 2.65 UIU/ML (ref 0.45–4.5)

## 2023-05-23 PROCEDURE — 99214 OFFICE O/P EST MOD 30 MIN: CPT | Performed by: NURSE PRACTITIONER

## 2023-05-23 PROCEDURE — 3074F SYST BP LT 130 MM HG: CPT | Performed by: NURSE PRACTITIONER

## 2023-05-23 PROCEDURE — 3078F DIAST BP <80 MM HG: CPT | Performed by: NURSE PRACTITIONER

## 2023-05-23 RX ORDER — LORAZEPAM 1 MG/1
1 TABLET ORAL 3 TIMES DAILY PRN
Qty: 90 TABLET | Refills: 1 | Status: SHIPPED | OUTPATIENT
Start: 2023-05-23 | End: 2023-07-22

## 2023-05-23 ASSESSMENT — PATIENT HEALTH QUESTIONNAIRE - PHQ9
SUM OF ALL RESPONSES TO PHQ QUESTIONS 1-9: 7
6. FEELING BAD ABOUT YOURSELF - OR THAT YOU ARE A FAILURE OR HAVE LET YOURSELF OR YOUR FAMILY DOWN: 0
3. TROUBLE FALLING OR STAYING ASLEEP: 2
SUM OF ALL RESPONSES TO PHQ QUESTIONS 1-9: 7
9. THOUGHTS THAT YOU WOULD BE BETTER OFF DEAD, OR OF HURTING YOURSELF: 0
1. LITTLE INTEREST OR PLEASURE IN DOING THINGS: 1
7. TROUBLE CONCENTRATING ON THINGS, SUCH AS READING THE NEWSPAPER OR WATCHING TELEVISION: 0
5. POOR APPETITE OR OVEREATING: 1
4. FEELING TIRED OR HAVING LITTLE ENERGY: 2
10. IF YOU CHECKED OFF ANY PROBLEMS, HOW DIFFICULT HAVE THESE PROBLEMS MADE IT FOR YOU TO DO YOUR WORK, TAKE CARE OF THINGS AT HOME, OR GET ALONG WITH OTHER PEOPLE: 1
SUM OF ALL RESPONSES TO PHQ9 QUESTIONS 1 & 2: 2
2. FEELING DOWN, DEPRESSED OR HOPELESS: 1
SUM OF ALL RESPONSES TO PHQ QUESTIONS 1-9: 7
8. MOVING OR SPEAKING SO SLOWLY THAT OTHER PEOPLE COULD HAVE NOTICED. OR THE OPPOSITE, BEING SO FIGETY OR RESTLESS THAT YOU HAVE BEEN MOVING AROUND A LOT MORE THAN USUAL: 0
SUM OF ALL RESPONSES TO PHQ QUESTIONS 1-9: 7

## 2023-05-23 NOTE — PROGRESS NOTES
CHIEF COMPLAINT:  Joseph Tavarez is a 39 y.o. male and was seen today for follow-up of psychiatric condition and psychotropic medication management. HPI:    Felicita Goldstein reports the following psychiatric symptoms by hx:  depression and anxiety. Overall symptoms have been present for years. Currently symptoms are is of moderate/high severity. Anxiety is exacerbated. The symptoms occur intermittently. Pt reports medications are effective, but symptoms are exacerbated. Patient is experiencing GI disturbance which is exacerbating anxiety. Patient reports his abdomen feels full, it is distended, he reports intermittent constipation and acid reflex. Patient states every time he experiences GI disturbance he fears she is having another MI and this causes panic attacks. Met with pt for appt today  to review current treatment plan.     FAMILY/SOCIAL HX:   Psychosocial Stressors      REVIEW OF SYSTEMS:  Psychiatric symptoms being monitored for:  depression, anxiety   Appetite:good   Sleep: poor with DIS (difficulty initiating sleep)   Neuro: denies    Cardio: some chest soreness     /60 (Site: Left Upper Arm, Position: Sitting, Cuff Size: Large Adult)   Pulse 82   Temp 97.1 °F (36.2 °C) (Temporal)   Resp 17   Wt 272 lb 3.2 oz (123.5 kg)   SpO2 98%   BMI 40.20 kg/m²     Side Effects:  none    MENTAL STATUS EXAM:   Sensorium  oriented to time, place and person   Relations cooperative   Appearance:  age appropriate and within normal Limits   Motor Behavior:  within normal limits   Speech:  normal pitch and normal volume   Thought Process: within normal limits   Thought Content free of delusions and free of hallucinations   Suicidal ideations none   Homicidal ideations none   Mood:  Anxious    Affect:  mood-congruent   Memory recent  adequate   Memory remote:  adequate   Concentration:  adequate   Abstraction:  abstract   Insight:  good   Reliability good   Judgment:  good        MEDICAL DECISION MAKING:  Problems

## 2023-05-23 NOTE — PROGRESS NOTES
Chief Complaint   Patient presents with    Medication Management     Vitals:    05/23/23 1410   BP: 110/60   Site: Left Upper Arm   Position: Sitting   Cuff Size: Large Adult   Pulse: 82   Resp: 17   Temp: 97.1 °F (36.2 °C)   TempSrc: Temporal   SpO2: 98%   Weight: 272 lb 3.2 oz (123.5 kg)     Prior to Admission medications    Medication Sig Start Date End Date Taking?  Authorizing Provider   sertraline (ZOLOFT) 100 MG tablet Take 1 tablet by mouth daily 5/15/23  Yes OMID Sibley NP   sertraline (ZOLOFT) 50 MG tablet Take 1 tablet by mouth daily 5/15/23  Yes OMID Combs NP   aluminum & magnesium hydroxide-simethicone (MAALOX) 414-357-76 MG/5ML SUSP suspension Take by mouth every 4 hours as needed 9/14/22  Yes Ar Automatic Reconciliation   aspirin 81 MG EC tablet Take by mouth daily 9/24/19  Yes Ar Automatic Reconciliation   busPIRone (BUSPAR) 15 MG tablet Take by mouth 3 times daily 8/12/22  Yes Ar Automatic Reconciliation   butalbital-acetaminophen-caffeine (FIORICET, ESGIC) -40 MG per tablet Take 1 tablet by mouth every 6 hours as needed 6/13/22  Yes Ar Automatic Reconciliation   vitamin D3 (CHOLECALCIFEROL) 125 MCG (5000 UT) TABS tablet Take by mouth daily 3/10/21  Yes Ar Automatic Reconciliation   clopidogrel (PLAVIX) 75 MG tablet Take by mouth daily 10/29/21  Yes Ar Automatic Reconciliation   esomeprazole (NEXIUM) 40 MG delayed release capsule TAKE 1 CAPSULE BY MOUTH ONCE DAILY BEFORE BREAKFAST DO NOT OPEN CAPSULE 4/27/22  Yes Ar Automatic Reconciliation   famotidine (PEPCID) 40 MG tablet Take by mouth daily 1/19/22  Yes Ar Automatic Reconciliation   Insulin Glargine, 2 Unit Dial, (TOUJEO MAX SOLOSTAR) 300 UNIT/ML SOPN 90 Units Twice daily 2/17/23  Yes Ar Automatic Reconciliation   insulin regular (HUMULIN R;NOVOLIN R) 100 UNIT/ML injection 50 Units Per meal and sliding scale 1/11/23  Yes Ar Automatic Reconciliation   levothyroxine (SYNTHROID) 125 MCG tablet Take by mouth every

## 2023-05-26 ENCOUNTER — OFFICE VISIT (OUTPATIENT)
Age: 43
End: 2023-05-26

## 2023-05-26 VITALS
HEIGHT: 69 IN | DIASTOLIC BLOOD PRESSURE: 61 MMHG | SYSTOLIC BLOOD PRESSURE: 118 MMHG | BODY MASS INDEX: 40.94 KG/M2 | HEART RATE: 86 BPM | WEIGHT: 276.4 LBS

## 2023-05-26 DIAGNOSIS — E03.9 ACQUIRED HYPOTHYROIDISM: ICD-10-CM

## 2023-05-26 DIAGNOSIS — E78.2 MIXED HYPERLIPIDEMIA: ICD-10-CM

## 2023-05-26 DIAGNOSIS — E29.1 TESTICULAR HYPOFUNCTION: ICD-10-CM

## 2023-05-26 DIAGNOSIS — E11.42 TYPE 2 DIABETES MELLITUS WITH DIABETIC POLYNEUROPATHY, WITH LONG-TERM CURRENT USE OF INSULIN (HCC): Primary | ICD-10-CM

## 2023-05-26 DIAGNOSIS — Z79.4 TYPE 2 DIABETES MELLITUS WITH DIABETIC POLYNEUROPATHY, WITH LONG-TERM CURRENT USE OF INSULIN (HCC): Primary | ICD-10-CM

## 2023-05-26 LAB — HBA1C MFR BLD: 9.3 %

## 2023-05-26 RX ORDER — FUROSEMIDE 40 MG/1
40 TABLET ORAL DAILY
COMMUNITY
Start: 2023-04-21

## 2023-05-26 RX ORDER — PROCHLORPERAZINE 25 MG/1
SUPPOSITORY RECTAL
COMMUNITY
Start: 2023-04-23

## 2023-05-26 RX ORDER — LEVOTHYROXINE SODIUM 0.15 MG/1
150 TABLET ORAL
Qty: 30 TABLET | Refills: 4 | Status: SHIPPED | OUTPATIENT
Start: 2023-05-26

## 2023-05-26 RX ORDER — SEMAGLUTIDE 0.68 MG/ML
INJECTION, SOLUTION SUBCUTANEOUS
Qty: 3 ML | Refills: 4 | Status: SHIPPED | OUTPATIENT
Start: 2023-05-26

## 2023-05-26 NOTE — PATIENT INSTRUCTIONS
1) Ozempic: Take 0.25mg every week for 4 weeks. After 4 weeks increase to 0.5mg every week. 2) I am increasing your Levothyroxine to 150mcg. You will still take one pill every day.

## 2023-05-26 NOTE — PROGRESS NOTES
Chief Complaint   Patient presents with    Diabetes     Pcp and pharmacy verified    Thyroid Problem     History of Present Illness: Arturo Sparks is a 39 y.o. male here for follow up of diabetes. diabetes. 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 Ian Payne of Cardiology. Pt had been experiencing more issues of CP and presented to the ED on 11/15/22 and was found to have multi-vessel disease. He was taken to the OR for CORONARY ARTERY BYPASS GRAFT  X 1 WITH LEFT INTERNAL MAMMARY ARTERY to LAD AORTIC VALVE REPLACEMENT  WITH VAIL 25MM INSPIRIS RESILIA TISSUE VALVE    REPAIR OF ASCENDING AORTIC ANEURYSM with 26mm hemashield graft. He was in the ICU on a vent for several days and was eventually discharged home on 10/9/22. He called me 10/10/22 and he noted that his BGs were running in the 300-400s. I instructed him to take Toujeo 140 units once per day (or he can take 70 units BID) and to take Novolin R 30 units with each meal plus 5:50 correction for BG >150. At our last visit in January 2023 his A1C had improved down to 8.1%. Based on his BG readings I instructed him to increase his Toujeo to 85 units in the AM and 85 units HS and to continue the Novolin R 35 units TID/AC plus 5:50 correction for BG > 150. Since our last visit we have been in contact and making multiple adjustments to his insulin regimen. \"My weight is going up and I am getting very aggravated about that. I am watching what I eat and I have cut back my sweets and I just can't seem to lose weight. \"  \"I went to see my psycharist and she wanted to refer me to a stomach doctor and want me to discuss you about ozempic. \"    Pt denies any recent illnesses, injuries or hospitalizations.     \"I have not discussed the testosterone with my cardiologist.\"    Pt is currently taking the Regular insulin 50 units with each meal plus correction with

## 2023-05-31 ENCOUNTER — TELEPHONE (OUTPATIENT)
Age: 43
End: 2023-05-31

## 2023-05-31 DIAGNOSIS — E11.42 TYPE 2 DIABETES MELLITUS WITH DIABETIC POLYNEUROPATHY, WITH LONG-TERM CURRENT USE OF INSULIN (HCC): ICD-10-CM

## 2023-05-31 DIAGNOSIS — Z79.4 TYPE 2 DIABETES MELLITUS WITH DIABETIC POLYNEUROPATHY, WITH LONG-TERM CURRENT USE OF INSULIN (HCC): ICD-10-CM

## 2023-05-31 RX ORDER — FUROSEMIDE 40 MG/1
TABLET ORAL
Qty: 30 TABLET | Refills: 0 | Status: SHIPPED | OUTPATIENT
Start: 2023-05-31

## 2023-05-31 RX ORDER — DULAGLUTIDE 0.75 MG/.5ML
0.75 INJECTION, SOLUTION SUBCUTANEOUS WEEKLY
Qty: 2 ML | Refills: 0 | Status: SHIPPED | OUTPATIENT
Start: 2023-05-31

## 2023-05-31 NOTE — TELEPHONE ENCOUNTER
Per Lani Bone, patient must try and fail 2 of the following covered medications before covering Ozempic:  Laurie [place]

## 2023-05-31 NOTE — TELEPHONE ENCOUNTER
Spoke with pt regarding the decision from his insurance company. I will order Trulicity 3.36DM weekly. Pt voices understanding and agreement with the plan.

## 2023-06-05 ENCOUNTER — TELEPHONE (OUTPATIENT)
Age: 43
End: 2023-06-05

## 2023-06-08 ENCOUNTER — CLINICAL DOCUMENTATION (OUTPATIENT)
Age: 43
End: 2023-06-08

## 2023-06-12 PROBLEM — M54.81 OCCIPITAL NEURALGIA OF RIGHT SIDE: Status: ACTIVE | Noted: 2023-06-12

## 2023-06-19 ENCOUNTER — OFFICE VISIT (OUTPATIENT)
Age: 43
End: 2023-06-19
Payer: COMMERCIAL

## 2023-06-19 VITALS
OXYGEN SATURATION: 97 % | BODY MASS INDEX: 40.14 KG/M2 | SYSTOLIC BLOOD PRESSURE: 120 MMHG | WEIGHT: 271 LBS | RESPIRATION RATE: 16 BRPM | TEMPERATURE: 97.6 F | HEIGHT: 69 IN | HEART RATE: 77 BPM | DIASTOLIC BLOOD PRESSURE: 68 MMHG

## 2023-06-19 DIAGNOSIS — E03.9 ACQUIRED HYPOTHYROIDISM: ICD-10-CM

## 2023-06-19 DIAGNOSIS — E11.42 TYPE 2 DIABETES MELLITUS WITH DIABETIC POLYNEUROPATHY, WITH LONG-TERM CURRENT USE OF INSULIN (HCC): ICD-10-CM

## 2023-06-19 DIAGNOSIS — I10 ESSENTIAL HYPERTENSION: Primary | ICD-10-CM

## 2023-06-19 DIAGNOSIS — E66.01 SEVERE OBESITY (HCC): ICD-10-CM

## 2023-06-19 DIAGNOSIS — N17.9 AKI (ACUTE KIDNEY INJURY) (HCC): ICD-10-CM

## 2023-06-19 DIAGNOSIS — Z79.4 TYPE 2 DIABETES MELLITUS WITH DIABETIC POLYNEUROPATHY, WITH LONG-TERM CURRENT USE OF INSULIN (HCC): ICD-10-CM

## 2023-06-19 DIAGNOSIS — I25.110 CORONARY ARTERY DISEASE INVOLVING NATIVE CORONARY ARTERY OF NATIVE HEART WITH UNSTABLE ANGINA PECTORIS (HCC): ICD-10-CM

## 2023-06-19 PROCEDURE — 3074F SYST BP LT 130 MM HG: CPT | Performed by: INTERNAL MEDICINE

## 2023-06-19 PROCEDURE — 99214 OFFICE O/P EST MOD 30 MIN: CPT | Performed by: INTERNAL MEDICINE

## 2023-06-19 PROCEDURE — 3078F DIAST BP <80 MM HG: CPT | Performed by: INTERNAL MEDICINE

## 2023-06-19 SDOH — ECONOMIC STABILITY: HOUSING INSECURITY
IN THE LAST 12 MONTHS, WAS THERE A TIME WHEN YOU DID NOT HAVE A STEADY PLACE TO SLEEP OR SLEPT IN A SHELTER (INCLUDING NOW)?: NO

## 2023-06-19 SDOH — ECONOMIC STABILITY: INCOME INSECURITY: HOW HARD IS IT FOR YOU TO PAY FOR THE VERY BASICS LIKE FOOD, HOUSING, MEDICAL CARE, AND HEATING?: SOMEWHAT HARD

## 2023-06-19 SDOH — ECONOMIC STABILITY: FOOD INSECURITY: WITHIN THE PAST 12 MONTHS, YOU WORRIED THAT YOUR FOOD WOULD RUN OUT BEFORE YOU GOT MONEY TO BUY MORE.: NEVER TRUE

## 2023-06-19 SDOH — ECONOMIC STABILITY: FOOD INSECURITY: WITHIN THE PAST 12 MONTHS, THE FOOD YOU BOUGHT JUST DIDN'T LAST AND YOU DIDN'T HAVE MONEY TO GET MORE.: NEVER TRUE

## 2023-06-19 ASSESSMENT — ANXIETY QUESTIONNAIRES
GAD7 TOTAL SCORE: 7
5. BEING SO RESTLESS THAT IT IS HARD TO SIT STILL: 1
7. FEELING AFRAID AS IF SOMETHING AWFUL MIGHT HAPPEN: 1
4. TROUBLE RELAXING: 1
6. BECOMING EASILY ANNOYED OR IRRITABLE: 1
3. WORRYING TOO MUCH ABOUT DIFFERENT THINGS: 1
IF YOU CHECKED OFF ANY PROBLEMS ON THIS QUESTIONNAIRE, HOW DIFFICULT HAVE THESE PROBLEMS MADE IT FOR YOU TO DO YOUR WORK, TAKE CARE OF THINGS AT HOME, OR GET ALONG WITH OTHER PEOPLE: NOT DIFFICULT AT ALL
1. FEELING NERVOUS, ANXIOUS, OR ON EDGE: 1
2. NOT BEING ABLE TO STOP OR CONTROL WORRYING: 1

## 2023-06-19 ASSESSMENT — PATIENT HEALTH QUESTIONNAIRE - PHQ9
2. FEELING DOWN, DEPRESSED OR HOPELESS: 1
5. POOR APPETITE OR OVEREATING: 1
SUM OF ALL RESPONSES TO PHQ9 QUESTIONS 1 & 2: 2
1. LITTLE INTEREST OR PLEASURE IN DOING THINGS: 1
7. TROUBLE CONCENTRATING ON THINGS, SUCH AS READING THE NEWSPAPER OR WATCHING TELEVISION: 1
SUM OF ALL RESPONSES TO PHQ QUESTIONS 1-9: 6
8. MOVING OR SPEAKING SO SLOWLY THAT OTHER PEOPLE COULD HAVE NOTICED. OR THE OPPOSITE, BEING SO FIGETY OR RESTLESS THAT YOU HAVE BEEN MOVING AROUND A LOT MORE THAN USUAL: 0
4. FEELING TIRED OR HAVING LITTLE ENERGY: 1
SUM OF ALL RESPONSES TO PHQ QUESTIONS 1-9: 6
10. IF YOU CHECKED OFF ANY PROBLEMS, HOW DIFFICULT HAVE THESE PROBLEMS MADE IT FOR YOU TO DO YOUR WORK, TAKE CARE OF THINGS AT HOME, OR GET ALONG WITH OTHER PEOPLE: 0
SUM OF ALL RESPONSES TO PHQ QUESTIONS 1-9: 6
6. FEELING BAD ABOUT YOURSELF - OR THAT YOU ARE A FAILURE OR HAVE LET YOURSELF OR YOUR FAMILY DOWN: 0
SUM OF ALL RESPONSES TO PHQ QUESTIONS 1-9: 6
9. THOUGHTS THAT YOU WOULD BE BETTER OFF DEAD, OR OF HURTING YOURSELF: 0
3. TROUBLE FALLING OR STAYING ASLEEP: 1

## 2023-06-19 NOTE — PROGRESS NOTES
1. \"Have you been to the ER, urgent care clinic since your last visit? Hospitalized since your last visit? \" No    2. \"Have you seen or consulted any other health care providers outside of the [address] [place] [address] since your last visit? \" No     3. For patients aged 39-70: Has the patient had a colonoscopy / FIT/ Cologuard?  NA - based on age
31.0 12/09/2022 03:34 AM     12/09/2022 03:34 AM    MCV 85.6 12/09/2022 03:34 AM

## 2023-06-20 LAB
ANION GAP SERPL CALC-SCNC: 4 MMOL/L (ref 5–15)
BUN SERPL-MCNC: 19 MG/DL (ref 6–20)
BUN/CREAT SERPL: 16 (ref 12–20)
CALCIUM SERPL-MCNC: 9.3 MG/DL (ref 8.5–10.1)
CHLORIDE SERPL-SCNC: 100 MMOL/L (ref 97–108)
CO2 SERPL-SCNC: 29 MMOL/L (ref 21–32)
CREAT SERPL-MCNC: 1.19 MG/DL (ref 0.7–1.3)
GLUCOSE SERPL-MCNC: 375 MG/DL (ref 65–100)
POTASSIUM SERPL-SCNC: 4.6 MMOL/L (ref 3.5–5.1)
SODIUM SERPL-SCNC: 133 MMOL/L (ref 136–145)

## 2023-06-26 RX ORDER — DULAGLUTIDE 0.75 MG/.5ML
INJECTION, SOLUTION SUBCUTANEOUS
Qty: 2 ML | Refills: 5 | Status: SHIPPED | OUTPATIENT
Start: 2023-06-26

## 2023-06-27 ENCOUNTER — CLINICAL DOCUMENTATION (OUTPATIENT)
Age: 43
End: 2023-06-27

## 2023-06-27 DIAGNOSIS — Z79.4 TYPE 2 DIABETES MELLITUS WITH DIABETIC POLYNEUROPATHY, WITH LONG-TERM CURRENT USE OF INSULIN (HCC): ICD-10-CM

## 2023-06-27 DIAGNOSIS — E11.42 TYPE 2 DIABETES MELLITUS WITH DIABETIC POLYNEUROPATHY, WITH LONG-TERM CURRENT USE OF INSULIN (HCC): ICD-10-CM

## 2023-06-29 RX ORDER — FUROSEMIDE 40 MG/1
TABLET ORAL
Qty: 30 TABLET | Refills: 0 | Status: SHIPPED | OUTPATIENT
Start: 2023-06-29

## 2023-07-03 RX ORDER — BUSPIRONE HYDROCHLORIDE 15 MG/1
TABLET ORAL
Qty: 90 TABLET | Refills: 5 | Status: SHIPPED | OUTPATIENT
Start: 2023-07-03

## 2023-07-05 ENCOUNTER — CLINICAL DOCUMENTATION (OUTPATIENT)
Age: 43
End: 2023-07-05

## 2023-07-05 NOTE — PROGRESS NOTES
Received JORDYN request from [email or web address] on 6/28/23. Faxed request to [facility] on 7/3/23.

## 2023-07-07 ENCOUNTER — CLINICAL DOCUMENTATION (OUTPATIENT)
Age: 43
End: 2023-07-07

## 2023-07-07 ENCOUNTER — TELEPHONE (OUTPATIENT)
Age: 43
End: 2023-07-07

## 2023-07-07 NOTE — PROGRESS NOTES
Received JORDYN request from [employer] on  6/8/2023. Faxed request to [facility] on 6/28/23.

## 2023-07-12 ENCOUNTER — TELEPHONE (OUTPATIENT)
Age: 43
End: 2023-07-12

## 2023-07-25 ENCOUNTER — TELEPHONE (OUTPATIENT)
Age: 43
End: 2023-07-25

## 2023-07-25 DIAGNOSIS — F41.9 ANXIETY: Primary | ICD-10-CM

## 2023-07-25 RX ORDER — LORAZEPAM 1 MG/1
1 TABLET ORAL 3 TIMES DAILY PRN
Qty: 75 TABLET | Refills: 1 | Status: SHIPPED | OUTPATIENT
Start: 2023-07-25 | End: 2023-09-23

## 2023-07-25 NOTE — TELEPHONE ENCOUNTER
Patient requesting refill  LORazepam (ATIVAN) 1 mg tablet to be sent to preferred pharmacy.      Preferred pharmacy:  [facility] [address] [place] [id number]  P)[phone] (v) [phone]    [place] :  Written:05/23/2023   Filed:05/23/2023 1   Drug:Lorazepam 1 Mg Tablet  Qty:90.00   Days:30   [place] Ntl    Last visit:05/23/23  Next visit:08/18/23 Sadaf Lezama

## 2023-07-25 NOTE — TELEPHONE ENCOUNTER
[place] :  Written:05/23/2023       Filed:05/23/2023         1            Drug:Lorazepam 1 Mg Tablet  Qty:90.00          Days:30             [place] Ntl     Last visit:05/23/23  Next visit:08/18/23 Sadaf Lezama

## 2023-08-04 DIAGNOSIS — E11.42 TYPE 2 DIABETES MELLITUS WITH DIABETIC POLYNEUROPATHY, WITH LONG-TERM CURRENT USE OF INSULIN (HCC): ICD-10-CM

## 2023-08-04 DIAGNOSIS — Z79.4 TYPE 2 DIABETES MELLITUS WITH DIABETIC POLYNEUROPATHY, WITH LONG-TERM CURRENT USE OF INSULIN (HCC): ICD-10-CM

## 2023-08-04 DIAGNOSIS — I10 ESSENTIAL HYPERTENSION: Primary | ICD-10-CM

## 2023-08-04 RX ORDER — FUROSEMIDE 40 MG/1
TABLET ORAL
Qty: 90 TABLET | Refills: 1 | Status: SHIPPED | OUTPATIENT
Start: 2023-08-04

## 2023-08-04 NOTE — TELEPHONE ENCOUNTER
Future Appointments:  Future Appointments   Date Time Provider [address]   8/18/2023  2:00 PM Elizabeth Mcleod APRN - CNP [facility] BS AMB   10/3/2023 12:10 PM Jaci Rollins MD RDE [facility] [address] BS AMB   12/19/2023  1:30 PM Maurice Zavala MD [facility] BS AMB   1/15/2024  2:30 PM Traci Valle, ANP [facility] BS AMB        Last Appointment With Me:  6/19/2023     Requested Prescriptions     Pending Prescriptions Disp Refills    furosemide (LASIX) 40 MG tablet [Pharmacy Med Name: Furosemide 40 MG Oral Tablet] 90 tablet 1     Sig: Take 1 tablet by mouth once daily

## 2023-08-18 ENCOUNTER — OFFICE VISIT (OUTPATIENT)
Age: 43
End: 2023-08-18
Payer: COMMERCIAL

## 2023-08-18 VITALS
SYSTOLIC BLOOD PRESSURE: 135 MMHG | TEMPERATURE: 97.2 F | BODY MASS INDEX: 40.4 KG/M2 | RESPIRATION RATE: 17 BRPM | DIASTOLIC BLOOD PRESSURE: 58 MMHG | WEIGHT: 272.8 LBS | HEIGHT: 69 IN | OXYGEN SATURATION: 98 % | HEART RATE: 82 BPM

## 2023-08-18 DIAGNOSIS — F43.10 POST-TRAUMATIC STRESS DISORDER, UNSPECIFIED: ICD-10-CM

## 2023-08-18 DIAGNOSIS — F33.1 MDD (MAJOR DEPRESSIVE DISORDER), RECURRENT EPISODE, MODERATE (HCC): Primary | ICD-10-CM

## 2023-08-18 DIAGNOSIS — G47.00 INSOMNIA, UNSPECIFIED TYPE: ICD-10-CM

## 2023-08-18 DIAGNOSIS — F41.1 GENERALIZED ANXIETY DISORDER: ICD-10-CM

## 2023-08-18 PROCEDURE — 3078F DIAST BP <80 MM HG: CPT | Performed by: NURSE PRACTITIONER

## 2023-08-18 PROCEDURE — 3075F SYST BP GE 130 - 139MM HG: CPT | Performed by: NURSE PRACTITIONER

## 2023-08-18 PROCEDURE — 99213 OFFICE O/P EST LOW 20 MIN: CPT | Performed by: NURSE PRACTITIONER

## 2023-08-18 ASSESSMENT — PATIENT HEALTH QUESTIONNAIRE - PHQ9
2. FEELING DOWN, DEPRESSED OR HOPELESS: 1
1. LITTLE INTEREST OR PLEASURE IN DOING THINGS: 2
4. FEELING TIRED OR HAVING LITTLE ENERGY: 2
9. THOUGHTS THAT YOU WOULD BE BETTER OFF DEAD, OR OF HURTING YOURSELF: 0
SUM OF ALL RESPONSES TO PHQ QUESTIONS 1-9: 12
5. POOR APPETITE OR OVEREATING: 2
3. TROUBLE FALLING OR STAYING ASLEEP: 2
SUM OF ALL RESPONSES TO PHQ9 QUESTIONS 1 & 2: 3
SUM OF ALL RESPONSES TO PHQ QUESTIONS 1-9: 12
6. FEELING BAD ABOUT YOURSELF - OR THAT YOU ARE A FAILURE OR HAVE LET YOURSELF OR YOUR FAMILY DOWN: 0
7. TROUBLE CONCENTRATING ON THINGS, SUCH AS READING THE NEWSPAPER OR WATCHING TELEVISION: 1
8. MOVING OR SPEAKING SO SLOWLY THAT OTHER PEOPLE COULD HAVE NOTICED. OR THE OPPOSITE, BEING SO FIGETY OR RESTLESS THAT YOU HAVE BEEN MOVING AROUND A LOT MORE THAN USUAL: 2
10. IF YOU CHECKED OFF ANY PROBLEMS, HOW DIFFICULT HAVE THESE PROBLEMS MADE IT FOR YOU TO DO YOUR WORK, TAKE CARE OF THINGS AT HOME, OR GET ALONG WITH OTHER PEOPLE: 0

## 2023-08-18 ASSESSMENT — ANXIETY QUESTIONNAIRES
3. WORRYING TOO MUCH ABOUT DIFFERENT THINGS: 0
5. BEING SO RESTLESS THAT IT IS HARD TO SIT STILL: 1
4. TROUBLE RELAXING: 1
GAD7 TOTAL SCORE: 8
1. FEELING NERVOUS, ANXIOUS, OR ON EDGE: 2
IF YOU CHECKED OFF ANY PROBLEMS ON THIS QUESTIONNAIRE, HOW DIFFICULT HAVE THESE PROBLEMS MADE IT FOR YOU TO DO YOUR WORK, TAKE CARE OF THINGS AT HOME, OR GET ALONG WITH OTHER PEOPLE: NOT DIFFICULT AT ALL
7. FEELING AFRAID AS IF SOMETHING AWFUL MIGHT HAPPEN: 1
6. BECOMING EASILY ANNOYED OR IRRITABLE: 2
2. NOT BEING ABLE TO STOP OR CONTROL WORRYING: 1

## 2023-08-18 NOTE — PATIENT INSTRUCTIONS
Learning About Anxiety Disorders  What are anxiety disorders? Anxiety disorders are a type of medical problem. They cause severe anxiety. When you feel anxious, you feel that something bad is about to happen. This feeling interferes with your life. These disorders include:  Generalized anxiety disorder. You feel worried and stressed about many everyday events and activities. This goes on for several months and disrupts your life on most days. Panic disorder. You have repeated panic attacks. A panic attack is a sudden, intense fear or anxiety. It may make you feel short of breath. Your heart may pound. Social anxiety disorder. You feel very anxious about what you will say or do in front of people. For example, you may be scared to talk or eat in public. This problem affects your daily life. Phobias. You are very scared of a specific object, situation, or activity. For example, you may fear spiders, high places, or small spaces. What are the symptoms? Generalized anxiety disorder  Symptoms may include:  Feeling worried and stressed about many things almost every day. Feeling tired or irritable. You may have a hard time concentrating. Having headaches or muscle aches. Having a hard time getting to sleep or staying asleep. Panic disorder  You may have repeated panic attacks when there is no reason for feeling afraid. You may change your daily activities because you worry that you will have another attack. Symptoms may include:  Intense fear, terror, or anxiety. Trouble breathing or very fast breathing. Chest pain or tightness. A heartbeat that races or is not regular. Social anxiety disorder  Symptoms may include:  Fear about a social situation, such as eating in front of others or speaking in public. You may worry a lot. Or you may be afraid that something bad will happen. Anxiety that can cause you to blush, sweat, and feel shaky.   A heartbeat that is faster than normal.  A hard time

## 2023-09-01 DIAGNOSIS — E03.9 ACQUIRED HYPOTHYROIDISM: ICD-10-CM

## 2023-09-01 DIAGNOSIS — E78.2 MIXED HYPERLIPIDEMIA: ICD-10-CM

## 2023-09-01 DIAGNOSIS — E11.42 TYPE 2 DIABETES MELLITUS WITH DIABETIC POLYNEUROPATHY, WITH LONG-TERM CURRENT USE OF INSULIN (HCC): ICD-10-CM

## 2023-09-01 DIAGNOSIS — E29.1 TESTICULAR HYPOFUNCTION: ICD-10-CM

## 2023-09-01 DIAGNOSIS — Z79.4 TYPE 2 DIABETES MELLITUS WITH DIABETIC POLYNEUROPATHY, WITH LONG-TERM CURRENT USE OF INSULIN (HCC): ICD-10-CM

## 2023-09-25 ENCOUNTER — OFFICE VISIT (OUTPATIENT)
Age: 43
End: 2023-09-25
Payer: COMMERCIAL

## 2023-09-25 VITALS
RESPIRATION RATE: 17 BRPM | SYSTOLIC BLOOD PRESSURE: 127 MMHG | OXYGEN SATURATION: 96 % | TEMPERATURE: 97.9 F | BODY MASS INDEX: 40.67 KG/M2 | DIASTOLIC BLOOD PRESSURE: 59 MMHG | WEIGHT: 274.6 LBS | HEIGHT: 69 IN | HEART RATE: 81 BPM

## 2023-09-25 DIAGNOSIS — F43.29 ADJUSTMENT DISORDER WITH OTHER SYMPTOMS: ICD-10-CM

## 2023-09-25 DIAGNOSIS — F41.1 GENERALIZED ANXIETY DISORDER: ICD-10-CM

## 2023-09-25 PROCEDURE — 3078F DIAST BP <80 MM HG: CPT | Performed by: NURSE PRACTITIONER

## 2023-09-25 PROCEDURE — 99214 OFFICE O/P EST MOD 30 MIN: CPT | Performed by: NURSE PRACTITIONER

## 2023-09-25 PROCEDURE — 3074F SYST BP LT 130 MM HG: CPT | Performed by: NURSE PRACTITIONER

## 2023-09-25 RX ORDER — SERTRALINE HYDROCHLORIDE 100 MG/1
100 TABLET, FILM COATED ORAL DAILY
Qty: 30 TABLET | Refills: 5 | Status: SHIPPED | OUTPATIENT
Start: 2023-09-25

## 2023-09-25 ASSESSMENT — PATIENT HEALTH QUESTIONNAIRE - PHQ9
SUM OF ALL RESPONSES TO PHQ QUESTIONS 1-9: 8
7. TROUBLE CONCENTRATING ON THINGS, SUCH AS READING THE NEWSPAPER OR WATCHING TELEVISION: 1
SUM OF ALL RESPONSES TO PHQ QUESTIONS 1-9: 8
8. MOVING OR SPEAKING SO SLOWLY THAT OTHER PEOPLE COULD HAVE NOTICED. OR THE OPPOSITE, BEING SO FIGETY OR RESTLESS THAT YOU HAVE BEEN MOVING AROUND A LOT MORE THAN USUAL: 0
6. FEELING BAD ABOUT YOURSELF - OR THAT YOU ARE A FAILURE OR HAVE LET YOURSELF OR YOUR FAMILY DOWN: 0
10. IF YOU CHECKED OFF ANY PROBLEMS, HOW DIFFICULT HAVE THESE PROBLEMS MADE IT FOR YOU TO DO YOUR WORK, TAKE CARE OF THINGS AT HOME, OR GET ALONG WITH OTHER PEOPLE: 1
4. FEELING TIRED OR HAVING LITTLE ENERGY: 1
9. THOUGHTS THAT YOU WOULD BE BETTER OFF DEAD, OR OF HURTING YOURSELF: 0
SUM OF ALL RESPONSES TO PHQ QUESTIONS 1-9: 8
5. POOR APPETITE OR OVEREATING: 2
2. FEELING DOWN, DEPRESSED OR HOPELESS: 1
SUM OF ALL RESPONSES TO PHQ9 QUESTIONS 1 & 2: 2
SUM OF ALL RESPONSES TO PHQ QUESTIONS 1-9: 8
3. TROUBLE FALLING OR STAYING ASLEEP: 2
1. LITTLE INTEREST OR PLEASURE IN DOING THINGS: 1

## 2023-09-25 ASSESSMENT — ANXIETY QUESTIONNAIRES
GAD7 TOTAL SCORE: 8
IF YOU CHECKED OFF ANY PROBLEMS ON THIS QUESTIONNAIRE, HOW DIFFICULT HAVE THESE PROBLEMS MADE IT FOR YOU TO DO YOUR WORK, TAKE CARE OF THINGS AT HOME, OR GET ALONG WITH OTHER PEOPLE: SOMEWHAT DIFFICULT
7. FEELING AFRAID AS IF SOMETHING AWFUL MIGHT HAPPEN: 0
4. TROUBLE RELAXING: 1
3. WORRYING TOO MUCH ABOUT DIFFERENT THINGS: 1
2. NOT BEING ABLE TO STOP OR CONTROL WORRYING: 1
1. FEELING NERVOUS, ANXIOUS, OR ON EDGE: 2
6. BECOMING EASILY ANNOYED OR IRRITABLE: 2
5. BEING SO RESTLESS THAT IT IS HARD TO SIT STILL: 1

## 2023-09-29 ENCOUNTER — TELEPHONE (OUTPATIENT)
Age: 43
End: 2023-09-29

## 2023-09-29 ENCOUNTER — OFFICE VISIT (OUTPATIENT)
Age: 43
End: 2023-09-29

## 2023-09-29 VITALS
SYSTOLIC BLOOD PRESSURE: 116 MMHG | HEART RATE: 87 BPM | HEIGHT: 69 IN | DIASTOLIC BLOOD PRESSURE: 67 MMHG | OXYGEN SATURATION: 97 % | TEMPERATURE: 98.8 F | BODY MASS INDEX: 41 KG/M2 | WEIGHT: 276.8 LBS

## 2023-09-29 DIAGNOSIS — J06.9 VIRAL UPPER RESPIRATORY TRACT INFECTION WITH COUGH: Primary | ICD-10-CM

## 2023-09-29 DIAGNOSIS — J00 ACUTE RHINITIS: ICD-10-CM

## 2023-09-29 LAB
Lab: NORMAL
QC PASS/FAIL: NORMAL
SARS-COV-2 RDRP RESP QL NAA+PROBE: NEGATIVE

## 2023-09-29 RX ORDER — PROCHLORPERAZINE 25 MG/1
SUPPOSITORY RECTAL
COMMUNITY
Start: 2023-07-28

## 2023-09-29 RX ORDER — LINACLOTIDE 72 UG/1
CAPSULE, GELATIN COATED ORAL
COMMUNITY
Start: 2023-07-20

## 2023-09-29 RX ORDER — MULTIVITAMIN,THERAPEUTIC
1 TABLET ORAL DAILY
COMMUNITY

## 2023-09-29 RX ORDER — BENZONATATE 200 MG/1
200 CAPSULE ORAL 3 TIMES DAILY PRN
Qty: 21 CAPSULE | Refills: 0 | Status: SHIPPED | OUTPATIENT
Start: 2023-09-29 | End: 2023-10-06

## 2023-09-29 RX ORDER — TRIAMCINOLONE ACETONIDE 55 UG/1
2 SPRAY, METERED NASAL DAILY
Qty: 1 EACH | Refills: 0 | Status: SHIPPED | OUTPATIENT
Start: 2023-09-29

## 2023-09-29 NOTE — PROGRESS NOTES
Subjective: (As above and below)     The patient/guardian gave verbal consent to treat. Chief Complaint   Patient presents with    New Patient    URI     Dry cough, sneezing, home covid test was negative. Angelo Cedeno is a 39 y.o. male who presents for evaluation of : dry cough, intermittent with sneezing. Symptom onset over past few days . Preceding illness: no.  No other identified aggravating or alleviating factors. Symptoms are constant and overall unchanged. Feels well otherwise with good energy. Denies: SOB, vomiting/diarrhea, rashes, fevers . Review of Systems    Review of Systems - negative except as listed above    Reviewed PmHx, RxHx, FmHx, SocHx, AllgHx and updated in chart.   Family History   Problem Relation Age of Onset    Diabetes Mother     Hemophilia Paternal Grandfather     Diabetes Paternal Grandmother     Hypertension Maternal Grandfather     High Cholesterol Maternal Grandfather     Heart Disease Maternal Grandfather     Hypertension Mother     Heart Disease Father     Cancer Father     Diabetes Father     Other Father         MAC    Diabetes Paternal Aunt     Diabetes Maternal Grandmother     Thyroid Cancer Maternal Grandmother     Kidney Disease Maternal Grandmother     Arthritis Maternal Grandmother        Past Medical History:   Diagnosis Date    Anxiety and depression     anxiety, depression    Bleeding of eye, left     8/17/21 pt reports receiving injections in right eye for beeding    Chronic headaches 2007    COVID-19 12/20/2020    Diabetes type 1, uncontrolled     since 8years old    GERD (gastroesophageal reflux disease)     Hypercholesterolemia     Hypertension     Hypothyroidism     MI (myocardial infarction) ([address] )     as of 8/17/21 pt reports 9 stents total    MADYSON on CPAP       Social History     Socioeconomic History    Marital status:      Spouse name: None    Number of children: None    Years of education: None    Highest education level: None

## 2023-09-29 NOTE — TELEPHONE ENCOUNTER
The patient c/o Cough,Congestion, Minor sore throat and sneezing. Denied fever. He tested NEGATIVE for Covid. This started about 3-4 days ago. He has been taking Cough drops and it has not helped. I explained to him unfortunately we do not have any same day appointments available. However, we do have a [address] [place] [address] Urgent Care on [facility] if you need to be seen sooner. Patient verbalized understanding of information discussed w/ no further questions at this time.

## 2023-09-29 NOTE — TELEPHONE ENCOUNTER
Received call from patient      Caller States:  Symptoms/concern:   Cough   Congestion   Minor sore throat  sneezing  Onset:   3-4 days ago  Covid Test:  Taking one today, doesn't have results yet   What has been tried:   Cough drops      APPOINTMENTS:  Appointment offers during call:  Declined VV, requests in office with any provider    Date of last appointment:    6/19/2023     Pharmacy confirmed as:  [address] [place] [address] [phone] - F [phone]      REQUEST:  In office appt        Caller confirms readback of documented phone/fax number(s) as correct. Caller advised that there can be a 24-48 business hour turn around time on callbacks. Caller advised that if pt deems they cannot wait any longer or symptoms worsen, ED or UC would be another option to consider for immediate treatment.

## 2023-10-03 ENCOUNTER — OFFICE VISIT (OUTPATIENT)
Age: 43
End: 2023-10-03

## 2023-10-03 VITALS
BODY MASS INDEX: 40.97 KG/M2 | HEART RATE: 80 BPM | SYSTOLIC BLOOD PRESSURE: 119 MMHG | DIASTOLIC BLOOD PRESSURE: 60 MMHG | HEIGHT: 69 IN | WEIGHT: 276.6 LBS

## 2023-10-03 DIAGNOSIS — Z79.4 TYPE 2 DIABETES MELLITUS WITH DIABETIC POLYNEUROPATHY, WITH LONG-TERM CURRENT USE OF INSULIN (HCC): Primary | ICD-10-CM

## 2023-10-03 DIAGNOSIS — Z79.4 TYPE 2 DIABETES MELLITUS WITH DIABETIC POLYNEUROPATHY, WITH LONG-TERM CURRENT USE OF INSULIN (HCC): ICD-10-CM

## 2023-10-03 DIAGNOSIS — E03.9 ACQUIRED HYPOTHYROIDISM: ICD-10-CM

## 2023-10-03 DIAGNOSIS — E78.2 MIXED HYPERLIPIDEMIA: ICD-10-CM

## 2023-10-03 DIAGNOSIS — E11.42 TYPE 2 DIABETES MELLITUS WITH DIABETIC POLYNEUROPATHY, WITH LONG-TERM CURRENT USE OF INSULIN (HCC): Primary | ICD-10-CM

## 2023-10-03 DIAGNOSIS — E11.42 TYPE 2 DIABETES MELLITUS WITH DIABETIC POLYNEUROPATHY, WITH LONG-TERM CURRENT USE OF INSULIN (HCC): ICD-10-CM

## 2023-10-03 DIAGNOSIS — E29.1 TESTICULAR HYPOFUNCTION: ICD-10-CM

## 2023-10-03 LAB — HBA1C MFR BLD: 9.5 %

## 2023-10-03 RX ORDER — DULAGLUTIDE 1.5 MG/.5ML
1.5 INJECTION, SOLUTION SUBCUTANEOUS WEEKLY
Qty: 2 ML | Refills: 1 | Status: SHIPPED | OUTPATIENT
Start: 2023-10-03

## 2023-10-03 RX ORDER — LEVOTHYROXINE SODIUM 0.15 MG/1
150 TABLET ORAL
Qty: 30 TABLET | Refills: 1 | Status: SHIPPED | OUTPATIENT
Start: 2023-10-03

## 2023-10-03 NOTE — PROGRESS NOTES
Chief Complaint   Patient presents with    Diabetes    Thyroid Problem     Pcp and pharmacy verified     History of Present Illness: Su Pettit is a 39 y.o. male here for follow up of diabetes. 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 Shannan Lucas of Cardiology. Pt had been experiencing more issues of CP and presented to the ED on 11/15/22 and was found to have multi-vessel disease. He was taken to the OR for CORONARY ARTERY BYPASS GRAFT  X 1 WITH LEFT INTERNAL MAMMARY ARTERY to LAD AORTIC VALVE REPLACEMENT  WITH VAIL 25MM INSPIRIS RESILIA TISSUE VALVE    REPAIR OF ASCENDING AORTIC ANEURYSM with 26mm hemashield graft. He was in the ICU on a vent for several days and was eventually discharged home on 10/9/22. He called me 10/10/22 and he noted that his BGs were running in the 300-400s. I instructed him to take Toujeo 140 units once per day (or he can take 70 units BID) and to take Novolin R 30 units with each meal plus 5:50 correction for BG >150. At our last visit in January 2023 his A1C had improved down to 8.1%. Based on his BG readings I instructed him to increase his Toujeo to 85 units in the AM and 85 units HS and to continue the Novolin R 35 units TID/AC plus 5:50 correction for BG > 150. Since our last visit we have been in contact and making multiple adjustments to his insulin regimen. \"My weight is going up and I am getting very aggravated about that. I am watching what I eat and I have cut back my sweets and I just can't seem to lose weight. \"  \"I went to see my psycharist and she wanted to refer me to a stomach doctor and want me to discuss you about ozempic. \"    \"My sugars have been higher, because I have a sinus infection and I have had a bad batch of sensors and I had to get [employer] to send me more sensors and the patches to keep them on.   I am now taking a MVI shake to help with

## 2023-10-04 LAB
ALBUMIN SERPL-MCNC: 3.7 G/DL (ref 3.5–5)
ALBUMIN/GLOB SERPL: 1.1 (ref 1.1–2.2)
ALP SERPL-CCNC: 162 U/L (ref 45–117)
ALT SERPL-CCNC: 90 U/L (ref 12–78)
ANION GAP SERPL CALC-SCNC: 6 MMOL/L (ref 5–15)
AST SERPL-CCNC: 37 U/L (ref 15–37)
BILIRUB SERPL-MCNC: 0.6 MG/DL (ref 0.2–1)
BUN SERPL-MCNC: 23 MG/DL (ref 6–20)
BUN/CREAT SERPL: 17 (ref 12–20)
CALCIUM SERPL-MCNC: 9.3 MG/DL (ref 8.5–10.1)
CHLORIDE SERPL-SCNC: 99 MMOL/L (ref 97–108)
CHOLEST SERPL-MCNC: 151 MG/DL
CO2 SERPL-SCNC: 29 MMOL/L (ref 21–32)
CREAT SERPL-MCNC: 1.33 MG/DL (ref 0.7–1.3)
CREAT UR-MCNC: 97.5 MG/DL
GLOBULIN SER CALC-MCNC: 3.3 G/DL (ref 2–4)
GLUCOSE SERPL-MCNC: 324 MG/DL (ref 65–100)
HDLC SERPL-MCNC: 28 MG/DL
HDLC SERPL: 5.4 (ref 0–5)
LDLC SERPL CALC-MCNC: ABNORMAL MG/DL (ref 0–100)
LDLC SERPL DIRECT ASSAY-MCNC: 74 MG/DL (ref 0–100)
MICROALBUMIN UR-MCNC: 9.98 MG/DL
MICROALBUMIN/CREAT UR-RTO: 102 MG/G (ref 0–30)
POTASSIUM SERPL-SCNC: 4.5 MMOL/L (ref 3.5–5.1)
PROT SERPL-MCNC: 7 G/DL (ref 6.4–8.2)
SODIUM SERPL-SCNC: 134 MMOL/L (ref 136–145)
T4 FREE SERPL-MCNC: 0.9 NG/DL (ref 0.8–1.5)
TRIGL SERPL-MCNC: 466 MG/DL
TSH SERPL DL<=0.05 MIU/L-ACNC: 3.22 UIU/ML (ref 0.36–3.74)
VLDLC SERPL CALC-MCNC: ABNORMAL MG/DL

## 2023-10-10 DIAGNOSIS — E11.42 TYPE 2 DIABETES MELLITUS WITH DIABETIC POLYNEUROPATHY, WITH LONG-TERM CURRENT USE OF INSULIN (HCC): ICD-10-CM

## 2023-10-10 DIAGNOSIS — I10 ESSENTIAL HYPERTENSION: ICD-10-CM

## 2023-10-10 DIAGNOSIS — Z79.4 TYPE 2 DIABETES MELLITUS WITH DIABETIC POLYNEUROPATHY, WITH LONG-TERM CURRENT USE OF INSULIN (HCC): ICD-10-CM

## 2023-10-10 RX ORDER — FUROSEMIDE 40 MG/1
TABLET ORAL
Qty: 120 TABLET | Refills: 1 | Status: SHIPPED | OUTPATIENT
Start: 2023-10-10

## 2023-10-11 NOTE — PROGRESS NOTES
Chief Complaint   Patient presents with    Diabetes     pcp and pharmacy verified    Thyroid Problem     History of Present Illness: Parvez Lazar is a 36 y.o. male here for follow up of diabetes. 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 Yesenia Fabian of Cardiology. He was also in the ED in January 2020 with issues of N/V. He was tested for influenza and it was negative. For his issues of vertigo, and dizziness he is followed by Dr. Kenan Hawley of Neurology. \"My sugars have been aggrivating me a lot. I have been adjusting my diet, cutting back on the sugars and my sugars are still running high. Some days I don't eat anything and I still can run in the 400s. \"    Pt is taking Lantus 90 units BID and Regular of 12-40 units with each meal.  \"If my sugars are over 300 I do 40 units, if I am in the 300s I take 30 and if I am not eating I will correct as needed. \"  Pt has not been having any issues of hypoglycemia. Pt is testing his BGs 4 times per day. His BGs are running in the 200-400s consistently. He is not having low BGs overnight. His BGs are high when he wakes up as well. He has not been using the Hernandez any longer. Pt is still following with Dr. Maria Del Carmen Rome of psychiatry for anxiety and depression. He has not been on any steroids since our last visit. Pt noted he was having some \"flutering feeling in my heart and I am wearing a heart monitor now. \"    Pt notes his vision is doing well and he is recovering from his retinopathy surgery. He is still having difficulty with seeing \"up close\". Pt is eating 2-3 meals per day  He has breakfast, around 730-830AM, yesterday he had sausage gravy, a biscuit, has browns and diet soda. today he did not eat breakfast.   He has lunch 3 days per week. He will eat around Noon-1PM, he did not eat lunch yesterday. He notes that he has not been snacking during the day.    He has dinner around 7-9PM, last night he had a ham steak and diet soda. He will snack in the evening. He notes he has been snacking on chips or a sandwich. He notes he is snacking at night, because he is hungry. He goes to bed around 11PM-2AM.    Pt has hx of CAD s/p stenting in December 2019. Pt is taking Rosuvastatin 40mg daily. He is followed by Dr. Fermin Jeffrey of Cardiology. Pt has hx of neuropathy. No hx of nephropathy. Pt has hx of hypothyroidism, since the age of 11-9 years old. He is currently taking 112mcg daily. At our visit in October 2019 we tested his Thyroid labs, which were normal, but he had a very low Testosterone of 199 on 10/22/19 and repeat was 240 in 10/28/19. His FSH, LH and prolactin were unremarkable. Pt opted to  Not start Clomid 50mg every other day. At our last visit we again ordered the clomid. Pt notes that he has been taking the Clomid 50mg every other day. He notes he is still having issues of ED and poor libido so he stopped taking the Clomid because of the cost.  He wanted to discuss trying an alternative agent. He is not currently taking the topical testosterone. \"I am using it once in a blood moon. I have not seen any benefit from it. \"  When he was using two pumps per day. Pt notes he was changed from CPAP to BiPap, but he had a panic attack and the BiPap made it worse \"so I have not used it since. \"    Current Outpatient Medications   Medication Sig    lisinopriL (PRINIVIL, ZESTRIL) 10 mg tablet Take 10 mg by mouth daily.  testosterone (ANDROGEL) 20.25 mg/1.25 gram (1.62 %) gel Apply 4 pump presses daily. Ok to dispense generic testosterone.  insulin U-500 CONCENTRATED regular (U-500) 500 unit/mL (3 mL) inpn subQ pen Take 100 units in the morning 100 units with lunch and 100 units with dinner.     insulin CONCENTRATED regular (U-500 CONCENTRATED) 500 unit/mL soln 100 units in the morning 100 units with lunch and 100 units with dinner    insulin U-500 syringe-needle (BD Insulin Syringe U-500) 1/2 mL 31 gauge x 15/64\" syrg Three shots per day.  insulin regular (NOVOLIN R, HUMULIN R) 100 unit/mL injection Take 20-40 unit with each meal 3 times per day. (Patient taking differently: Take 12-40 unit with each meal 3 times per day.)    butalbital-acetaminophen-caffeine (FIORICET, ESGIC) -40 mg per tablet Take 1 Tablet by mouth every six (6) hours as needed for Headache. Indications: a migraine headache    esomeprazole (NEXIUM) 40 mg capsule TAKE 1 CAPSULE BY MOUTH ONCE DAILY BEFORE BREAKFAST DO NOT OPEN CAPSULE    sertraline (ZOLOFT) 100 mg tablet Take 1 Tablet by mouth daily.  LORazepam (ATIVAN) 1 mg tablet Take 1 Tablet by mouth every eight (8) hours as needed for Anxiety. Max Daily Amount: 3 mg.  sertraline (ZOLOFT) 25 mg tablet Take 1 Tablet by mouth daily.  levothyroxine (SYNTHROID) 112 mcg tablet TAKE 1 TABLET BY MOUTH ONCE DAILY BEFORE BREAKFAST    busPIRone (BUSPAR) 15 mg tablet Take 1 Tablet by mouth three (3) times daily.  metoprolol succinate (TOPROL-XL) 25 mg XL tablet Take 25 mg by mouth two (2) times a day.  rosuvastatin (CRESTOR) 40 mg tablet Take 40 mg by mouth daily.  famotidine (PEPCID) 40 mg tablet Take 1 Tablet by mouth daily.  insulin glargine (Lantus U-100 Insulin) 100 unit/mL injection INJECT 90 UNITS SUBCUTANEOUSLY IN THE MORNING AND 90 UNITS AT NIGHT    clopidogreL (PLAVIX) 75 mg tab Take 1 Tablet by mouth daily.  metFORMIN (GLUCOPHAGE) 500 mg tablet Take 1 Tablet by mouth two (2) times daily (with meals).  cholecalciferol (Vitamin D3) (5000 Units/125 mcg) tab tablet Take 1 Tab by mouth daily.  Insulin Syringe-Needle U-100 (BD Insulin Syringe) 1 mL 25 x 1\" syrg Use for drawing up/injecting testosterone once weekly.  aluminum & magnesium hydroxide-simethicone (Maalox Maximum Strength) 400-400-40 mg/5 mL suspension Take 10 mL by mouth every six (6) hours as needed for Indigestion.     nitroglycerin (NITROSTAT) 0.4 mg SL tablet Take 1 Tab by mouth every five (5) minutes as needed for Chest Pain. Sit down then put one tab under the tongue every 5 minutes as needed    aspirin delayed-release 81 mg tablet Take 1 Tab by mouth daily.  ubrogepant (UBRELVY) 100 mg tablet Take 1 Tablet by mouth daily as needed for Migraine. Indications: a migraine headache (Patient not taking: Reported on 6/29/2022)    lidocaine (XYLOCAINE) 2 % solution Take 15 mL by mouth as needed for Pain. Gargle and spit out to help with pain (Patient not taking: Reported on 5/2/2022)     No current facility-administered medications for this visit. Allergies   Allergen Reactions    Morphine Nausea and Vomiting    Penicillin G Swelling    Percocet [Oxycodone-Acetaminophen] Hives and Nausea and Vomiting     Pt is allergic to Oxycodone, has previously tolerated Tylenol and Hydrocodone.  Sulfa (Sulfonamide Antibiotics) Swelling    Tramadol Nausea Only     Nausea and headache       Review of Systems:  - Eyes: no blurry vision or double vision  - Cardiovascular: no chest pain  - Respiratory: no shortness of breath  - Musculoskeletal: no myalgias  - Neurological: no numbness/tingling in extremities    Physical Examination:  Blood pressure 105/65, pulse 72, height 5' 9\" (1.753 m), weight 259 lb 3.2 oz (117.6 kg).   - General: pleasant, no distress, good eye contact   - Neck: no carotid bruits  - Cardiovascular: regular, normal rate, nl s1 and s2, no m/r/g, 2+ DP pulses   - Respiratory: clear bilaterally  - Integumentary: no edema, no foot ulcers,   - Psychiatric: normal mood and affect    Diabetic foot exam:     Left Foot:   Visual Exam: callous - present   Pulse DP: 2+ (normal)   Filament test: normal sensation    Vibratory sensation: normal      Right Foot:   Visual Exam: callous - present   Pulse DP: 2+ (normal)   Filament test: normal sensation    Vibratory sensation: normal        Data Reviewed:   Component      Latest Ref Rng & Units 4/26/2022 4/26/2022          12:00 AM 12:00 AM   LDL-C, External        60   Hemoglobin A1c, External      % 10.1        Assessment/Plan:   1) DM > His A1C in April 2022 was 10.1%. Will have pt stop the Lantus and R and start him on U-500 100mg TID. Pt to check his BGs 4 times per day and mail his BG logs to me in 4 weeks. 2) Hypothyroidism > Pt is clinically euthyroid on LT4 112mcg daily. 3) Hypogonadism > Will have pt increase the Testosterone to 4 pumps per day. I spent 40 minutes on his case and > 50% of the time was spent in counseling on diabetes management and determining what changes to make to his insulin regimen. Pt voices understanding and agreement with the plan. Pain noted and pt was recommended to call his PCP for further evaluation and treatment, as needed    RTC 4 months    Follow-up and Dispositions    · Return in about 4 months (around 10/29/2022).      Copy sent to:  Dr. Ronald Davis 11-Oct-2023 08:44

## 2023-10-30 ENCOUNTER — OFFICE VISIT (OUTPATIENT)
Age: 43
End: 2023-10-30
Payer: COMMERCIAL

## 2023-10-30 VITALS
BODY MASS INDEX: 41.44 KG/M2 | SYSTOLIC BLOOD PRESSURE: 137 MMHG | WEIGHT: 279.8 LBS | HEIGHT: 69 IN | RESPIRATION RATE: 17 BRPM | OXYGEN SATURATION: 95 % | HEART RATE: 75 BPM | DIASTOLIC BLOOD PRESSURE: 81 MMHG | TEMPERATURE: 97.4 F

## 2023-10-30 DIAGNOSIS — M79.2 NEUROPATHIC PAIN OF RIGHT FOOT: ICD-10-CM

## 2023-10-30 DIAGNOSIS — R60.0 LEG EDEMA: Primary | ICD-10-CM

## 2023-10-30 DIAGNOSIS — I87.2 VENOUS STASIS DERMATITIS OF BOTH LOWER EXTREMITIES: ICD-10-CM

## 2023-10-30 PROCEDURE — 3075F SYST BP GE 130 - 139MM HG: CPT | Performed by: INTERNAL MEDICINE

## 2023-10-30 PROCEDURE — 3079F DIAST BP 80-89 MM HG: CPT | Performed by: INTERNAL MEDICINE

## 2023-10-30 PROCEDURE — 99213 OFFICE O/P EST LOW 20 MIN: CPT | Performed by: INTERNAL MEDICINE

## 2023-11-07 ENCOUNTER — OFFICE VISIT (OUTPATIENT)
Age: 43
End: 2023-11-07
Payer: MEDICARE

## 2023-11-07 VITALS
DIASTOLIC BLOOD PRESSURE: 53 MMHG | SYSTOLIC BLOOD PRESSURE: 116 MMHG | HEART RATE: 79 BPM | OXYGEN SATURATION: 96 % | BODY MASS INDEX: 40.29 KG/M2 | TEMPERATURE: 97.9 F | WEIGHT: 272 LBS | RESPIRATION RATE: 17 BRPM | HEIGHT: 69 IN

## 2023-11-07 DIAGNOSIS — F33.1 MDD (MAJOR DEPRESSIVE DISORDER), RECURRENT EPISODE, MODERATE (HCC): Primary | ICD-10-CM

## 2023-11-07 DIAGNOSIS — F43.10 POST-TRAUMATIC STRESS DISORDER, UNSPECIFIED: ICD-10-CM

## 2023-11-07 DIAGNOSIS — F41.1 GENERALIZED ANXIETY DISORDER: ICD-10-CM

## 2023-11-07 PROCEDURE — 3074F SYST BP LT 130 MM HG: CPT | Performed by: NURSE PRACTITIONER

## 2023-11-07 PROCEDURE — 3078F DIAST BP <80 MM HG: CPT | Performed by: NURSE PRACTITIONER

## 2023-11-07 PROCEDURE — 90833 PSYTX W PT W E/M 30 MIN: CPT | Performed by: NURSE PRACTITIONER

## 2023-11-07 PROCEDURE — 99214 OFFICE O/P EST MOD 30 MIN: CPT | Performed by: NURSE PRACTITIONER

## 2023-11-07 RX ORDER — LORAZEPAM 1 MG/1
1 TABLET ORAL 3 TIMES DAILY PRN
Qty: 90 TABLET | Refills: 0 | Status: SHIPPED | OUTPATIENT
Start: 2023-11-07 | End: 2023-12-07

## 2023-11-07 ASSESSMENT — ANXIETY QUESTIONNAIRES
4. TROUBLE RELAXING: 1
2. NOT BEING ABLE TO STOP OR CONTROL WORRYING: 2
GAD7 TOTAL SCORE: 10
1. FEELING NERVOUS, ANXIOUS, OR ON EDGE: 2
5. BEING SO RESTLESS THAT IT IS HARD TO SIT STILL: 1
7. FEELING AFRAID AS IF SOMETHING AWFUL MIGHT HAPPEN: 1
IF YOU CHECKED OFF ANY PROBLEMS ON THIS QUESTIONNAIRE, HOW DIFFICULT HAVE THESE PROBLEMS MADE IT FOR YOU TO DO YOUR WORK, TAKE CARE OF THINGS AT HOME, OR GET ALONG WITH OTHER PEOPLE: SOMEWHAT DIFFICULT
3. WORRYING TOO MUCH ABOUT DIFFERENT THINGS: 1
6. BECOMING EASILY ANNOYED OR IRRITABLE: 2

## 2023-11-07 ASSESSMENT — PATIENT HEALTH QUESTIONNAIRE - PHQ9
SUM OF ALL RESPONSES TO PHQ QUESTIONS 1-9: 7
9. THOUGHTS THAT YOU WOULD BE BETTER OFF DEAD, OR OF HURTING YOURSELF: 0
2. FEELING DOWN, DEPRESSED OR HOPELESS: 0
10. IF YOU CHECKED OFF ANY PROBLEMS, HOW DIFFICULT HAVE THESE PROBLEMS MADE IT FOR YOU TO DO YOUR WORK, TAKE CARE OF THINGS AT HOME, OR GET ALONG WITH OTHER PEOPLE: 0
SUM OF ALL RESPONSES TO PHQ QUESTIONS 1-9: 7
SUM OF ALL RESPONSES TO PHQ QUESTIONS 1-9: 7
5. POOR APPETITE OR OVEREATING: 2
SUM OF ALL RESPONSES TO PHQ QUESTIONS 1-9: 7
6. FEELING BAD ABOUT YOURSELF - OR THAT YOU ARE A FAILURE OR HAVE LET YOURSELF OR YOUR FAMILY DOWN: 0
SUM OF ALL RESPONSES TO PHQ9 QUESTIONS 1 & 2: 1
1. LITTLE INTEREST OR PLEASURE IN DOING THINGS: 1
8. MOVING OR SPEAKING SO SLOWLY THAT OTHER PEOPLE COULD HAVE NOTICED. OR THE OPPOSITE, BEING SO FIGETY OR RESTLESS THAT YOU HAVE BEEN MOVING AROUND A LOT MORE THAN USUAL: 0
7. TROUBLE CONCENTRATING ON THINGS, SUCH AS READING THE NEWSPAPER OR WATCHING TELEVISION: 0
3. TROUBLE FALLING OR STAYING ASLEEP: 2
4. FEELING TIRED OR HAVING LITTLE ENERGY: 2

## 2023-11-07 NOTE — PROGRESS NOTES
CHIEF COMPLAINT:  Agus Berman is a , 39 y.o. male, ather of 1 son (26 y/o, Dagmar Rooney), has a girlfriend (Portia) of 8 yrs and is domiciled with mother in her home. Today he is being seen for a scheduled for follow-up appointment to discuss mood symptom management associated with the historical diagnoses of SADE, MDD, recurrent, moderate, and Insomnia with psychotropic medication management. *Last in office- 9/25/23- No med changes; 8/18/23- No medication changes: Continue Ativan 1 mg daily, Buspar 15 mg BID, and Zoloft 50 mg.6/28/23: No changes to meds. HPI:    (11/7/23) Today, Dagmar Rooney states, \"I'm doing pretty good. \"   Historically, pt reported depression and anxiety with associated symptoms including- racing thoughts, argumentative, irritable, tense, restless, insomnia, tired, multiple panic attacks, insomnia (trouble initiating asleep) which started after cardiac issues, 2019. He started psychiatric treatment with Dr. Patsy Loya on 3/24/2020. Around that time, pt reported the recent death of his grandparents, wife's infidelity, and then divorce. His father later passed away in 2020. Pt was started on Cymbalta initially by PCP. Currently, MOOD is described as slightly more anxiety then depression. His mom was recently hospitalized for an MI in October, had surgery was discharged after 1 1/2 weeks, with subsequent re-admit. Things have settled down with his son, and pt benefiting from better communication from his ex-wife. APPETITE reports getting willis sooner, although not intentional, he has lost some wt since last OV. SLEEP admits to not having a routine, will sleep longer in the morning then go to bed later the following night. Dagmar Rooney denies SI, plan, or intent. He denies ever hx of HI/AVH, delusional thinking or paranoia. He denies the use of alcohol, tobacco, and all illicit substances. Currently, depression and anxiety symptoms continue and are of mild severity.   PHQ9- 7 Indicating mild
units with dinner, plus correction, max daily dose 200 units  Patient taking differently: 42 units with breakfast, 42 units with lunch and 42 units with dinner, plus correction, max daily dose 200 units 8/31/23  Yes Andie Fletcher MD   busPIRone (BUSPAR) 15 MG tablet TAKE 1 TABLET BY MOUTH THREE TIMES DAILY  Patient taking differently: in the morning and at bedtime 7/3/23  Yes Faiza Young MD   Continuous Blood Gluc Sensor (DEXCOM G6 SENSOR) MISC USE TO READ BLOOD SUGARS.  CHANGE EVERY 10 DAYS 4/23/23  Yes Provider, MD Cain   aluminum & magnesium hydroxide-simethicone (MAALOX) 088-518-93 MG/5ML SUSP suspension Take by mouth every 4 hours as needed 9/14/22  Yes Automatic Reconciliation, Ar   aspirin 81 MG EC tablet Take by mouth daily 9/24/19  Yes Automatic Reconciliation, Ar   butalbital-acetaminophen-caffeine (FIORICET, ESGIC) -40 MG per tablet Take 1 tablet by mouth every 6 hours as needed 6/13/22  Yes Automatic Reconciliation, Ar   vitamin D3 (CHOLECALCIFEROL) 125 MCG (5000 UT) TABS tablet Take by mouth daily 3/10/21  Yes Automatic Reconciliation, Ar   clopidogrel (PLAVIX) 75 MG tablet Take by mouth daily 10/29/21  Yes Automatic Reconciliation, Ar   esomeprazole (NEXIUM) 40 MG delayed release capsule in the morning and at bedtime 4/27/22  Yes Automatic Reconciliation, Ar   famotidine (PEPCID) 40 MG tablet Take by mouth 2 times daily 1/19/22  Yes Automatic Reconciliation, Ar   Insulin Glargine, 2 Unit Dial, (TOUJEO MAX SOLOSTAR) 300 UNIT/ML SOPN 80 Units Twice daily 2/17/23  Yes Automatic Reconciliation, Ar   metoprolol succinate (TOPROL XL) 25 MG extended release tablet Take by mouth 2 times daily   Yes Automatic Reconciliation, Ar   rosuvastatin (CRESTOR) 40 MG tablet Take by mouth daily 2/16/22  Yes Automatic Reconciliation, Ar   simethicone (MYLICON) 80 MG chewable tablet Take by mouth every 6 hours as needed 3/10/23  Yes Automatic Reconciliation, Ar     PHQ-9 Total Score: 7 (11/7/2023

## 2023-11-15 ENCOUNTER — OFFICE VISIT (OUTPATIENT)
Age: 43
End: 2023-11-15

## 2023-11-15 ENCOUNTER — HOSPITAL ENCOUNTER (EMERGENCY)
Facility: HOSPITAL | Age: 43
Discharge: LEFT BEFORE DISCHARGE | End: 2023-11-15

## 2023-11-15 VITALS
TEMPERATURE: 97.4 F | HEART RATE: 83 BPM | WEIGHT: 274 LBS | SYSTOLIC BLOOD PRESSURE: 134 MMHG | DIASTOLIC BLOOD PRESSURE: 74 MMHG | BODY MASS INDEX: 40.46 KG/M2 | RESPIRATION RATE: 20 BRPM | OXYGEN SATURATION: 97 %

## 2023-11-15 DIAGNOSIS — M70.62 TROCHANTERIC BURSITIS OF LEFT HIP: ICD-10-CM

## 2023-11-15 DIAGNOSIS — M16.0 PRIMARY OSTEOARTHRITIS OF BOTH HIPS: Primary | ICD-10-CM

## 2023-11-15 PROBLEM — Z95.2 S/P AVR (AORTIC VALVE REPLACEMENT) AND AORTOPLASTY: Status: ACTIVE | Noted: 2022-11-22

## 2023-11-15 PROBLEM — E10.3553 STABLE PROLIFERATIVE DIABETIC RETINOPATHY OF BOTH EYES ASSOCIATED WITH TYPE 1 DIABETES MELLITUS (HCC): Status: ACTIVE | Noted: 2022-01-19

## 2023-11-15 PROBLEM — M47.812 SPONDYLOSIS OF CERVICAL SPINE: Status: ACTIVE | Noted: 2021-12-03

## 2023-11-15 PROBLEM — H04.123 DRY EYES: Status: ACTIVE | Noted: 2022-03-02

## 2023-11-15 PROBLEM — F43.29 ADJUSTMENT DISORDER WITH MIXED EMOTIONAL FEATURES: Status: ACTIVE | Noted: 2020-03-24

## 2023-11-15 PROBLEM — H52.4 MYOPIA WITH ASTIGMATISM AND PRESBYOPIA: Status: ACTIVE | Noted: 2022-03-15

## 2023-11-15 PROBLEM — I65.23 BILATERAL CAROTID ARTERY STENOSIS: Status: ACTIVE | Noted: 2022-06-13

## 2023-11-15 PROBLEM — H52.10 MYOPIA WITH ASTIGMATISM AND PRESBYOPIA: Status: ACTIVE | Noted: 2022-03-15

## 2023-11-15 PROBLEM — R42 DIZZY SPELLS: Status: ACTIVE | Noted: 2021-08-23

## 2023-11-15 PROBLEM — Z95.1 S/P CABG X 1: Status: ACTIVE | Noted: 2022-11-22

## 2023-11-15 PROBLEM — I63.81 MULTIPLE LACUNAR INFARCTS (HCC): Status: ACTIVE | Noted: 2021-08-23

## 2023-11-15 PROBLEM — H52.209 MYOPIA WITH ASTIGMATISM AND PRESBYOPIA: Status: ACTIVE | Noted: 2022-03-15

## 2023-11-15 PROBLEM — S05.01XA CORNEAL ABRASION, RIGHT: Status: ACTIVE | Noted: 2022-01-19

## 2023-11-15 RX ORDER — KETOROLAC TROMETHAMINE 10 MG/1
10 TABLET, FILM COATED ORAL EVERY 6 HOURS PRN
Qty: 20 TABLET | Refills: 0 | Status: SHIPPED | OUTPATIENT
Start: 2023-11-15

## 2023-11-15 NOTE — PROGRESS NOTES
Chief Complaint   Patient presents with    Hip Pain     Patient presents for (L) hip pain x 3 days. No injuries or falls patient woke up 3 days ago with pain in the left hip that radiates down to the left knee          Hip Pain   The incident occurred more than 1 week ago. There was no injury mechanism. The pain is present in the left hip. The quality of the pain is described as aching. The pain is at a severity of 8/10. The pain is severe. The pain has been Constant since onset. Associated symptoms comments: Pain on left hip, outer, lateral hip  ansd radiate down to lateral thigh   Constant pain   Pain increases on movement/ walking   No injury/ overuse . The symptoms are aggravated by palpation and weight bearing. He has tried NSAIDs for the symptoms.        Past Medical History:   Diagnosis Date    Anxiety and depression     anxiety, depression    Bleeding of eye, left     21 pt reports receiving injections in right eye for beeding    Chronic headaches     COVID-19 2020    Diabetes type 1, uncontrolled     since 8years old    GERD (gastroesophageal reflux disease)     Hypercholesterolemia     Hypertension     Hypothyroidism     MI (myocardial infarction) ([address])     as of 21 pt reports 9 stents total    MADYSON on CPAP        Past Surgical History:   Procedure Laterality Date    CARDIAC CATHETERIZATION      CARDIAC VALVE SURGERY  2022    CARPAL TUNNEL RELEASE Right     CTE trigger thumb and index finger    CARPAL TUNNEL RELEASE Left     CHOLECYSTECTOMY, LAPAROSCOPIC  2014    Dr Bradley Mast EXTRACTION         Social History     Tobacco Use    Smoking status: Former     Packs/day: 0     Types: Cigarettes     Quit date: 2014     Years since quittin.8     Passive exposure: Never    Smokeless tobacco: Never   Vaping Use    Vaping Use: Never used   Substance Use Topics    Alcohol use: No    Drug use: No        Allergies   Allergen Reactions

## 2023-11-21 ENCOUNTER — OFFICE VISIT (OUTPATIENT)
Age: 43
End: 2023-11-21
Payer: MEDICARE

## 2023-11-21 VITALS
OXYGEN SATURATION: 97 % | TEMPERATURE: 97.4 F | BODY MASS INDEX: 41.03 KG/M2 | DIASTOLIC BLOOD PRESSURE: 70 MMHG | WEIGHT: 277 LBS | HEART RATE: 83 BPM | SYSTOLIC BLOOD PRESSURE: 160 MMHG | RESPIRATION RATE: 18 BRPM | HEIGHT: 69 IN

## 2023-11-21 DIAGNOSIS — M79.89 LEG SWELLING: ICD-10-CM

## 2023-11-21 DIAGNOSIS — M54.81 OCCIPITAL NEURALGIA OF RIGHT SIDE: ICD-10-CM

## 2023-11-21 DIAGNOSIS — M54.32 SCIATICA OF LEFT SIDE: Primary | ICD-10-CM

## 2023-11-21 PROCEDURE — 1036F TOBACCO NON-USER: CPT | Performed by: PSYCHIATRY & NEUROLOGY

## 2023-11-21 PROCEDURE — 3077F SYST BP >= 140 MM HG: CPT | Performed by: PSYCHIATRY & NEUROLOGY

## 2023-11-21 PROCEDURE — 3078F DIAST BP <80 MM HG: CPT | Performed by: PSYCHIATRY & NEUROLOGY

## 2023-11-21 PROCEDURE — G8484 FLU IMMUNIZE NO ADMIN: HCPCS | Performed by: PSYCHIATRY & NEUROLOGY

## 2023-11-21 PROCEDURE — G8417 CALC BMI ABV UP PARAM F/U: HCPCS | Performed by: PSYCHIATRY & NEUROLOGY

## 2023-11-21 PROCEDURE — G8427 DOCREV CUR MEDS BY ELIG CLIN: HCPCS | Performed by: PSYCHIATRY & NEUROLOGY

## 2023-11-21 PROCEDURE — 99215 OFFICE O/P EST HI 40 MIN: CPT | Performed by: PSYCHIATRY & NEUROLOGY

## 2023-11-21 RX ORDER — PREGABALIN 25 MG/1
25 CAPSULE ORAL 3 TIMES DAILY PRN
Qty: 90 CAPSULE | Refills: 1 | Status: SHIPPED | OUTPATIENT
Start: 2023-11-21 | End: 2024-01-20

## 2023-11-21 NOTE — ASSESSMENT & PLAN NOTE
Acute/subacute onset  Treatment options are very limited secondary to comorbid conditions and polypharmacy  We will try very low-dose Lyrica 25 mg up to 3 times daily as needed for pain  If he cannot tolerate the Lyrica he is to discontinue it  I have recommended a TENS unit  And physical therapy has been ordered

## 2023-11-21 NOTE — PATIENT INSTRUCTIONS
As per discussion we are limited regarding medications we can use to help manage the sciatica    I have recommended a TENS unit you can buy them off [address] [place] [address] to help with the pain and were going to start you on physical [facility] you can stop over there on your way out to set up your initial appointment    I have also started pregabalin also known as Lyrica 25 mg and you can take 1 3 times a day as needed for pain working to keep you on low-dose due to other medical issues and to help prevent side effects of swelling if you get side effects with this medicine you just can have to stop it. I would recommend if your body tolerates it to take at least 1 in the evening so you can get some better sleep at night    And as always continue to work on your blood sugar control    And I would strongly recommend seeing dermatology about your left leg    I have ordered EMG and ultrasound. The EMGs to evaluate this sciatica and to see if there is any other process occurring and the ultra sound of your lower extremity to evaluate circulation  I will go over those results when available via Ansible and anything differently we may need to do based on those results    In the meantime I hope you have a radha Thanksgiving and holiday season        [employer]    Phone calls/patient messages:  Please allow up to 24 hours for someone in the office to contact you about your call or message. Be mindful your provider may be out of the office or your message may require further review. We encourage you to use Ansible for your messages as this is a faster, more efficient way to communicate with our office    Medication Refills:  Prescription medications require up to 48 business hours to process. We encourage you to use Ansible for your refills. For controlled medications: Please allow up to 72 business hours to process. Certain medications may require you to  a written prescription at our office.     NO

## 2023-11-21 NOTE — PROGRESS NOTES
mg 90 capsule 1    LORazepam (ATIVAN) 1 MG tablet Take 1 tablet by mouth 3 times daily as needed for Anxiety for up to 30 days. Max Daily Amount: 3 mg 90 tablet 0    betamethasone valerate (VALISONE) 0.1 % ointment Apply topically 2 times daily. 15 g 1    furosemide (LASIX) 40 MG tablet Take one daily. Take a second dose as needed for excessive edema. 120 tablet 1    levothyroxine (SYNTHROID) 150 MCG tablet Take 1 tablet by mouth every morning (before breakfast) 30 tablet 1    dulaglutide (TRULICITY) 1.5 VC/8.4FW SC injection Inject 0.5 mLs into the skin once a week 2 mL 1    LINZESS 72 MCG CAPS capsule TAKE 1 CAPSULE BY MOUTH ONCE DAILY BEFORE BREAKFAST ON AN EMPTY STOMACH AT LEAST 30 MINUTES PRIOR TO FIRST MEAL OF THE DAY      Continuous Blood Gluc Transmit (DEXCOM G6 TRANSMITTER) MISC USE ON SENSOR TO READ BLOOD SUGAR. CHANGE EVERY 90 DAYS      sertraline (ZOLOFT) 100 MG tablet Take 1 tablet by mouth daily 30 tablet 5    sertraline (ZOLOFT) 50 MG tablet Take 1 tablet by mouth daily 30 tablet 5    insulin regular (HUMULIN R) 100 UNIT/ML injection 50 units with breakfast, 50 units with lunch and 50 units with dinner, plus correction, max daily dose 200 units (Patient taking differently: 42 units with breakfast, 42 units with lunch and 42 units with dinner, plus correction, max daily dose 200 units) 90 mL 5    busPIRone (BUSPAR) 15 MG tablet TAKE 1 TABLET BY MOUTH THREE TIMES DAILY (Patient taking differently: in the morning and at bedtime) 90 tablet 5    Continuous Blood Gluc Sensor (DEXCOM G6 SENSOR) MISC USE TO READ BLOOD SUGARS.  CHANGE EVERY 10 DAYS      aluminum & magnesium hydroxide-simethicone (MAALOX) 200-200-20 MG/5ML SUSP suspension Take by mouth every 4 hours as needed      aspirin 81 MG EC tablet Take by mouth daily      butalbital-acetaminophen-caffeine (FIORICET, ESGIC) -40 MG per tablet Take 1 tablet by mouth every 6 hours as needed      vitamin D3 (CHOLECALCIFEROL) 125 MCG (5000 UT) TABS tablet

## 2023-11-21 NOTE — ASSESSMENT & PLAN NOTE
We will check ultrasound  Encourage patient to keep his comorbid conditions under good control specially his blood sugar  And to follow-up with primary care

## 2023-11-29 ENCOUNTER — HOSPITAL ENCOUNTER (EMERGENCY)
Facility: HOSPITAL | Age: 43
Discharge: HOME | End: 2023-11-29
Payer: MEDICARE

## 2023-11-29 ENCOUNTER — APPOINTMENT (OUTPATIENT)
Facility: HOSPITAL | Age: 43
End: 2023-11-29
Payer: MEDICARE

## 2023-11-29 VITALS
BODY MASS INDEX: 40.59 KG/M2 | HEART RATE: 86 BPM | OXYGEN SATURATION: 98 % | HEIGHT: 69 IN | RESPIRATION RATE: 18 BRPM | WEIGHT: 274.03 LBS | TEMPERATURE: 97.5 F | SYSTOLIC BLOOD PRESSURE: 146 MMHG | DIASTOLIC BLOOD PRESSURE: 86 MMHG

## 2023-11-29 DIAGNOSIS — M47.9 SPONDYLOSIS: ICD-10-CM

## 2023-11-29 DIAGNOSIS — M54.2 NECK PAIN: Primary | ICD-10-CM

## 2023-11-29 PROCEDURE — 6370000000 HC RX 637 (ALT 250 FOR IP)

## 2023-11-29 PROCEDURE — 99283 EMERGENCY DEPT VISIT LOW MDM: CPT

## 2023-11-29 PROCEDURE — 72050 X-RAY EXAM NECK SPINE 4/5VWS: CPT

## 2023-11-29 RX ORDER — CYCLOBENZAPRINE HCL 10 MG
10 TABLET ORAL ONCE
Status: COMPLETED | OUTPATIENT
Start: 2023-11-29 | End: 2023-11-29

## 2023-11-29 RX ORDER — IBUPROFEN 600 MG/1
600 TABLET ORAL 3 TIMES DAILY PRN
Qty: 30 TABLET | Refills: 0 | Status: SHIPPED | OUTPATIENT
Start: 2023-11-29

## 2023-11-29 RX ORDER — CYCLOBENZAPRINE HCL 10 MG
10 TABLET ORAL 3 TIMES DAILY PRN
Qty: 21 TABLET | Refills: 0 | Status: SHIPPED | OUTPATIENT
Start: 2023-11-29 | End: 2023-12-09

## 2023-11-29 RX ADMIN — CYCLOBENZAPRINE 10 MG: 10 TABLET, FILM COATED ORAL at 23:09

## 2023-11-29 ASSESSMENT — PAIN - FUNCTIONAL ASSESSMENT: PAIN_FUNCTIONAL_ASSESSMENT: 0-10

## 2023-11-29 ASSESSMENT — PAIN SCALES - GENERAL: PAINLEVEL_OUTOF10: 8

## 2023-11-29 ASSESSMENT — PAIN DESCRIPTION - DESCRIPTORS: DESCRIPTORS: SORE

## 2023-11-29 ASSESSMENT — PAIN DESCRIPTION - ORIENTATION: ORIENTATION: POSTERIOR

## 2023-11-29 ASSESSMENT — PAIN DESCRIPTION - LOCATION: LOCATION: NECK

## 2023-12-02 NOTE — ED PROVIDER NOTES
[facility] EMERGENCY DEPT  EMERGENCY DEPARTMENT ENCOUNTER         Pt Name: Domenica Martin  MRN: [id number]  [address] [facility] 1980  Date of evaluation: 11/29/2023  Provider: Jackie Patel PA-C   PCP: Eliot Avitia MD  Note Started: 2:59 AM EST 12/2/23     CHIEF COMPLAINT       Chief Complaint   Patient presents with    Neck Pain     Pt ambulatory to triage with c/o posterior neck pain x 2 days; denies injury or fall; hx of bulging disc        HISTORY OF PRESENT ILLNESS: 1 or more elements      History From: Patient  HPI Limitations: None     Domenica Martin is a 39 y.o. male who presents ambulatory to the emergency department for evaluation of posterior neck pain that began 2 days ago. Patient states that the pain came on without injury. Patient denies falls. Patient does state that he has a history of \"bulging disks\" of the C-spine. Patient states that he is primarily here today to ensure that he does not have another disc bulging. Patient denies fever or chills, denies headache. Patient denies chest pains or shortness of breath, denies GI upset. Nursing Notes were all reviewed and agreed with or any disagreements were addressed in the HPI. REVIEW OF SYSTEMS      Review of Systems     Positives and Pertinent negatives as per HPI.     PAST HISTORY     Past Medical History:  Past Medical History:   Diagnosis Date    Anxiety and depression     anxiety, depression    Bleeding of eye, left     8/17/21 pt reports receiving injections in right eye for beeding    Chronic headaches 2007    COVID-19 12/20/2020    Diabetes type 1, uncontrolled     since 8years old    GERD (gastroesophageal reflux disease)     Hypercholesterolemia     Hypertension     Hypothyroidism     MI (myocardial infarction) ([address])     as of 8/17/21 pt reports 9 stents total    MADYSON on CPAP        Past Surgical History:  Past Surgical History:   Procedure Laterality Date    [address]  11/22/2022

## 2024-01-02 ENCOUNTER — TELEPHONE (OUTPATIENT)
Age: 44
End: 2024-01-02

## 2024-01-02 RX ORDER — SIMETHICONE 80 MG
80 TABLET,CHEWABLE ORAL EVERY 6 HOURS PRN
Qty: 180 TABLET | Refills: 3 | Status: SHIPPED | OUTPATIENT
Start: 2024-01-02

## 2024-01-02 NOTE — TELEPHONE ENCOUNTER
----- Message from Janice Pantoja sent at 1/2/2024  3:06 PM EST -----  Subject: Refill Request    QUESTIONS  Name of Medication? simethicone (MYLICON) 80 MG chewable tablet  Patient-reported dosage and instructions? every 6 hours as needed  How many days do you have left? 0  Preferred Pharmacy? [id number]  Pharmacy phone number (if available)? [phone]  Additional Information for Provider? patient requesting this be sent to   Jose at [address] [facility] Jose told him that if he is   able to get a higher dose they will be able to fill it OTC, if not they   will have to order the 80MG. [facility] no longer carries this script  ---------------------------------------------------------------------------  --------------  CALL BACK INFO  What is the best way for the office to contact you? OK to leave message on   voicemail  Preferred Call Back Phone Number? [phone]  ---------------------------------------------------------------------------  --------------  SCRIPT ANSWERS  Relationship to Patient? Self

## 2024-01-05 RX ORDER — DULAGLUTIDE 1.5 MG/.5ML
INJECTION, SOLUTION SUBCUTANEOUS
Qty: 2 ML | Refills: 2 | Status: SHIPPED | OUTPATIENT
Start: 2024-01-05

## 2024-01-09 DIAGNOSIS — E03.9 ACQUIRED HYPOTHYROIDISM: ICD-10-CM

## 2024-01-09 RX ORDER — LEVOTHYROXINE SODIUM 0.15 MG/1
TABLET ORAL
Qty: 30 TABLET | Refills: 1 | Status: SHIPPED | OUTPATIENT
Start: 2024-01-09

## 2024-01-15 ENCOUNTER — OFFICE VISIT (OUTPATIENT)
Age: 44
End: 2024-01-15
Payer: MEDICARE

## 2024-01-15 VITALS
SYSTOLIC BLOOD PRESSURE: 120 MMHG | HEART RATE: 79 BPM | HEIGHT: 69 IN | DIASTOLIC BLOOD PRESSURE: 64 MMHG | WEIGHT: 283 LBS | BODY MASS INDEX: 41.92 KG/M2

## 2024-01-15 VITALS
OXYGEN SATURATION: 96 % | SYSTOLIC BLOOD PRESSURE: 138 MMHG | HEART RATE: 80 BPM | HEIGHT: 69 IN | TEMPERATURE: 97.5 F | WEIGHT: 280.2 LBS | RESPIRATION RATE: 16 BRPM | DIASTOLIC BLOOD PRESSURE: 68 MMHG | BODY MASS INDEX: 41.5 KG/M2

## 2024-01-15 DIAGNOSIS — I63.81 MULTIPLE LACUNAR INFARCTS (HCC): ICD-10-CM

## 2024-01-15 DIAGNOSIS — R68.81 EARLY SATIETY: ICD-10-CM

## 2024-01-15 DIAGNOSIS — G43.009 MIGRAINE WITHOUT AURA AND WITHOUT STATUS MIGRAINOSUS, NOT INTRACTABLE: Primary | ICD-10-CM

## 2024-01-15 DIAGNOSIS — E11.42 TYPE 2 DIABETES MELLITUS WITH DIABETIC POLYNEUROPATHY, WITH LONG-TERM CURRENT USE OF INSULIN (HCC): Primary | ICD-10-CM

## 2024-01-15 DIAGNOSIS — M54.32 SCIATICA OF LEFT SIDE: ICD-10-CM

## 2024-01-15 DIAGNOSIS — Z79.4 TYPE 2 DIABETES MELLITUS WITH DIABETIC POLYNEUROPATHY, WITH LONG-TERM CURRENT USE OF INSULIN (HCC): Primary | ICD-10-CM

## 2024-01-15 DIAGNOSIS — M54.81 OCCIPITAL NEURALGIA OF RIGHT SIDE: ICD-10-CM

## 2024-01-15 DIAGNOSIS — E78.2 MIXED HYPERLIPIDEMIA: ICD-10-CM

## 2024-01-15 DIAGNOSIS — E03.9 ACQUIRED HYPOTHYROIDISM: ICD-10-CM

## 2024-01-15 DIAGNOSIS — G44.86 CERVICOGENIC HEADACHE: ICD-10-CM

## 2024-01-15 DIAGNOSIS — R19.00 ABDOMINAL SWELLING: ICD-10-CM

## 2024-01-15 LAB — HBA1C MFR BLD: 10.5 %

## 2024-01-15 PROCEDURE — 3074F SYST BP LT 130 MM HG: CPT | Performed by: INTERNAL MEDICINE

## 2024-01-15 PROCEDURE — 3075F SYST BP GE 130 - 139MM HG: CPT | Performed by: PSYCHIATRY & NEUROLOGY

## 2024-01-15 PROCEDURE — 95251 CONT GLUC MNTR ANALYSIS I&R: CPT | Performed by: INTERNAL MEDICINE

## 2024-01-15 PROCEDURE — 99214 OFFICE O/P EST MOD 30 MIN: CPT | Performed by: PSYCHIATRY & NEUROLOGY

## 2024-01-15 PROCEDURE — 3078F DIAST BP <80 MM HG: CPT | Performed by: PSYCHIATRY & NEUROLOGY

## 2024-01-15 PROCEDURE — 99214 OFFICE O/P EST MOD 30 MIN: CPT | Performed by: INTERNAL MEDICINE

## 2024-01-15 PROCEDURE — 83036 HEMOGLOBIN GLYCOSYLATED A1C: CPT | Performed by: INTERNAL MEDICINE

## 2024-01-15 PROCEDURE — 96372 THER/PROPH/DIAG INJ SC/IM: CPT | Performed by: PSYCHIATRY & NEUROLOGY

## 2024-01-15 PROCEDURE — 3078F DIAST BP <80 MM HG: CPT | Performed by: INTERNAL MEDICINE

## 2024-01-15 PROCEDURE — G2211 COMPLEX E/M VISIT ADD ON: HCPCS | Performed by: INTERNAL MEDICINE

## 2024-01-15 RX ORDER — DULAGLUTIDE 3 MG/.5ML
3 INJECTION, SOLUTION SUBCUTANEOUS WEEKLY
Qty: 2 ML | Refills: 5 | Status: SHIPPED | OUTPATIENT
Start: 2024-01-15

## 2024-01-15 RX ORDER — INSULIN GLARGINE 300 U/ML
INJECTION, SOLUTION SUBCUTANEOUS
Qty: 90 ML | Refills: 1 | Status: SHIPPED | OUTPATIENT
Start: 2024-01-15

## 2024-01-15 RX ORDER — DULAGLUTIDE 3 MG/.5ML
3 INJECTION, SOLUTION SUBCUTANEOUS WEEKLY
Qty: 2 ML | Refills: 5 | Status: CANCELLED | OUTPATIENT
Start: 2024-01-15

## 2024-01-15 ASSESSMENT — PATIENT HEALTH QUESTIONNAIRE - PHQ9
1. LITTLE INTEREST OR PLEASURE IN DOING THINGS: 0
SUM OF ALL RESPONSES TO PHQ QUESTIONS 1-9: 0
2. FEELING DOWN, DEPRESSED OR HOPELESS: 0
SUM OF ALL RESPONSES TO PHQ QUESTIONS 1-9: 0
SUM OF ALL RESPONSES TO PHQ9 QUESTIONS 1 & 2: 0

## 2024-01-15 NOTE — ASSESSMENT & PLAN NOTE
Stable without recurrence of symptoms  Patient is to continue on aspirin and Plavix [he is on both for cardiac reasons]    Patient is to continue to work to all of his comorbid conditions as managed by PCP and other specialists as appropriate    RECOMMENDATIONS:  - BP goal is less than 140/90  - Goal HbA1c is less than 7.   - LDL less than 70

## 2024-01-15 NOTE — ASSESSMENT & PLAN NOTE
Patient is having worsening headaches I think it has been an escalation over time because he is not effectively treating it with abortive medications.  I just tried to manage it behaviorally which has not been effective    He was given a total of 140 mg of Aimovig today in the office as a one-time dose [we used 2 of the 70 mg doses since we did not have anyone for these in the office]    We reviewed the necessity to treat the migraine when they occur patient is agreed to go back to using his Fioricet as needed and to continue to stay off OTCs which he has been doing

## 2024-01-15 NOTE — DISCHARGE INSTRUCTIONS
Developing a Pain Management Plan: Care Instructions  Your Care Instructions  Some diseases and injuries can cause pain that lasts a long time. You don't need to live with uncontrolled pain. A pain management plan helps you find ways to control pain with side effects you can live with. There are things you can do to help with pain. Only you know how much pain you feel. Constant pain can make you depressed. It can cause stress and make it hard for you to eat and sleep. But there are ways to control the pain. They can help you stay active, improve your mood, and heal faster. Your plan can include many types of pain control. You may take prescription or over-the-counter drugs. You can also try physical treatments and behavioral methods. Some medical treatments also help with pain. For example, radiation can be used to reduce pain from bone cancer. You and your doctor will work to make your plan. Be sure to read it often. Change it if your pain is not under control. Follow-up care is a key part of your treatment and safety. Be sure to make and go to all appointments, and call your doctor if you are having problems. It's also a good idea to know your test results and keep a list of the medicines you take. How can you care for yourself at home? Physical treatments  · Be safe with medicines. Take pain medicines exactly as directed. ¨ If the doctor gave you a prescription medicine for pain, take it exactly as prescribed. ¨ If you are not taking a prescription pain medicine, ask your doctor if you can take an over-the-counter medicine. · If your pain medicine causes side effects such as constipation or nausea, you may need to take other medicines for those problems. Talk to your doctor about any side effects you have. · Hot or cold compresses applied directly to the skin can help sore muscles. Put ice or a cold pack on the painful area for 10 to 20 minutes at a time.  Put a thin cloth between the ice and your Hi Dr Vizcaino,  Please see Raine's message.   skin. You may find that it helps to switch between cold and heat. Try a heating pad set on low or a warm cloth on the area for 10 to 15 minutes at a time. · Hydrotherapy uses flowing water to relax muscles. You may want to sit in a hot tub or a steam bath. Or use a shower or a sitz bath to help your pain. · Massage therapy is rubbing the soft tissues of the body. It eases tension and pain. It also improves blood flow and helps you relax. · Transcutaneous electrical nerve stimulation (TENS) uses a gentle electric current applied to the skin for pain relief. You can use TENS at home after you learn how. · Acupuncture is a form of traditional [facility] medicine. It uses very thin needles inserted into certain points of the body. It is done by a person with special training (acupuncturist). · If you get physical therapy, make sure to do any home exercises or stretching your therapist has prescribed. · Stay as active as you can. Try to get some physical activity every day. Behavioral treatments  · Biofeedback teaches you to control a body function that you normally don't think about. These are things like skin temperature, heart rate, and blood pressure. At first, you will use a machine to give you feedback on the function you want to control. Then a therapist will teach you what to do next. Over time, you can stop using the machine. · Breathing techniques can help you relax and get rid of tension. · Guided imagery is a series of thoughts and images that can focus your attention away from your pain. When you do it, you use all your senses to help your body respond as though what you are imagining is real.  · Hypnosis is a state of focused concentration that makes you less aware of your surroundings. It may cause your brain to release chemicals that relieve pain. You can have a therapist help you through hypnosis. Or you can learn to use it on yourself. · Cognitive behavioral therapy is a type of counseling.  It helps you change your thought patterns. Changes in your thoughts can help change your behavior and the way you perceive your body. Other treatments and ideas  · Aromatherapy uses the scent of oils obtained from plants to help you relax or to relieve stress. · Meditation focuses your attention to give a clear awareness of your life. You sit quietly, focus on one image or sound, and breathe deeply. · Yoga uses stretching and exercises (called postures) to reduce stress and improve flexibility and health. · Keep track of your pain in a pain diary. This can help you understand how the things you do affect your pain. · In rare cases, your doctor may advise you to get a nerve block to help with pain. A nerve block is a shot of medicine to interrupt pain signals to your brain. · Some hospitals have special pain clinics or centers. Your doctor may refer you to a pain clinic or center. Or you can ask your doctor about them. Where can you learn more? Go to http://ladarius-pavel.info/. Enter V667 in the search box to learn more about \"Developing a Pain Management Plan: Care Instructions. \"  Current as of: February 19, 2016  Content Version: 11.1  © 5025-3326 [employer]. Care instructions adapted under license by [employer] (which disclaims liability or warranty for this information). If you have questions about a medical condition or this instruction, always ask your healthcare professional. [address] any warranty or liability for your use of this information. Torticollis: Care Instructions  Your Care Instructions  Torticollis is a severe tightness of the muscles on one side of the neck. The tight muscles can make the head turn to one side, lean to one side, or be pulled forward or backward. It is also called wryneck. Your doctor asked questions about your health and examined you. You may also have had X-rays or other tests.  If your doctor thinks another medical problem is causing your tight neck muscles, you may need more tests. Torticollis usually gets better with home care. Your doctor may have you take medicine to relieve pain or relax your muscles. He or she may suggest exercise and physical therapy to help increase flexibility and relieve stress. Your doctor may also have you wear a special collar, called a cervical collar, for a day or two. The collar may help make your neck more comfortable. Follow-up care is a key part of your treatment and safety. Be sure to make and go to all appointments, and call your doctor if you are having problems. It's also a good idea to know your test results and keep a list of the medicines you take. How can you care for yourself at home? · Be safe with medicines. Read and follow all instructions on the label. ¨ If the doctor gave you a prescription medicine for pain, take it as prescribed. ¨ If you are not taking a prescription pain medicine, ask your doctor if you can take an over-the-counter medicine. · Try using a heating pad on a low or medium setting for 15 to 20 minutes every 2 or 3 hours. Try a warm shower in place of one session with the heating pad. · Try using an ice pack for 10 to 15 minutes every 2 to 3 hours. Put a thin cloth between the ice and your skin. · If your doctor recommends a cervical collar, wear it exactly as directed. When should you call for help? Call your doctor now or seek immediate medical care if:  · You have new or worse numbness in your arms, buttocks, or legs. · You have new or worse weakness in your arms or legs. · Your neck pain gets worse. · You lose bladder or bowel control. Watch closely for changes in your health, and be sure to contact your doctor if:  · You do not get better as expected. Where can you learn more? Go to http://ladarius-pavel.info/.   Enter G705 in the search box to learn more about \"Torticollis: Care Instructions. \"  Current as of: May 23, 2016  Content Version: 11.1  © 8417-9795 [employer] [facility]. Care instructions adapted under license by [employer] (which disclaims liability or warranty for this information). If you have questions about a medical condition or this instruction, always ask your healthcare professional. [place] [address] any warranty or liability for your use of this information.

## 2024-01-15 NOTE — PATIENT INSTRUCTIONS
As per discussion  We have given you a shot of  Aimovig today in the office to try to calm your headaches down and then what I would like you to do is to make sure that you use the Fioricet as needed when you get the migraine to get rid of it if you do not do that HAs start to escalate on their own    Continue with the physical therapy for the sciatica at [facility]  I have added a prescription for [facility] to work on the upper neck and to consider dry needling if appropriate        Office Policies    Phone calls/patient messages:  Please allow up to 24 hours for someone in the office to contact you about your call or message. Be mindful your provider may be out of the office or your message may require further review. We encourage you to use [employer] for your messages as this is a faster, more efficient way to communicate with our office    Medication Refills:  Prescription medications require up to 48 business hours to process. We encourage you to use [employer] for your refills.     For controlled medications: Please allow up to 72 business hours to process. Certain medications may require you to  a written prescription at our office.    NO narcotic/controlled medications will be prescribed after 4pm Monday through Friday or on weekends    Form/Paperwork Completion:  We ask that you allow 7-14 business days. You may also download your forms to [employer] to have your doctor print off.

## 2024-01-15 NOTE — ASSESSMENT & PLAN NOTE
MRI scan from 2022 does show some arthritic changes and some stenosis in the cervical region.  This is probably contributing to his headaches.  In addition there is a component of occipital neuralgia.    We are adding physical therapy to try to down regulate symptoms at this time

## 2024-01-15 NOTE — PROGRESS NOTES
Chief Complaint   Patient presents with    Diabetes     Pcp and pharmacy verified    Thyroid Problem     History of Present Illness: Kyree Munguia is a 43 y.o. male here for follow up of diabetes.  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 Alonzo of Cardiology.   Pt had been experiencing more issues of CP and presented to the ED on 11/15/22 and was found to have multi-vessel disease. He was taken to the OR for CORONARY ARTERY BYPASS GRAFT  X 1 WITH LEFT INTERNAL MAMMARY ARTERY to LAD AORTIC VALVE REPLACEMENT  WITH VAIL 25MM INSPIRIS RESILIA TISSUE VALVE    REPAIR OF ASCENDING AORTIC ANEURYSM with 26mm hemashield graft.      He was in the ICU on a vent for several days and was eventually discharged home on 10/9/22.   He called me 10/10/22 and he noted that his BGs were running in the 300-400s.   I instructed him to take Toujeo 140 units once per day (or he can take 70 units BID) and to take Novolin R 30 units with each meal plus 5:50 correction for BG >150.      At our last visit in January 2023 his A1C had improved down to 8.1%. Based on his BG readings I instructed him to increase his Toujeo to 85 units in the AM and 85 units HS and to continue the Novolin R 35 units TID/AC plus 5:50 correction for BG > 150.    \"I have been playing around with my insulin some, I have been having issues of high blood sugars and some low blood sugars.    \"I did miss a couple dose of the Trulicity and my BG did go josias high after that.\"    PT notes he started having low BG \"about 2-3 weeks ago. I was dropping after dinner, so I decreased my Humulin R with dinner. The lowest I saw was 60 and that felt very bad.\"    He is currently taking 80 units in the AM and 80 units HS and the Humalin R 20-42 with meals, plus the sliding scale \"which is frequently needed\".    Pt denies any recent illnesses, injuries or hospitalizations.    His A1C today

## 2024-01-15 NOTE — ASSESSMENT & PLAN NOTE
Seemingly flaring up a bit.  And this may be due to a incomplete management of migraines when they occur.  We did discuss using the Fioricet at onset to kick out the headache.    I am also going to add in physical therapy to help with the right-sided occipital neuralgia and neck pain and if appropriate consider dry needling with the physical therapy for the neck and upper back region

## 2024-01-15 NOTE — ASSESSMENT & PLAN NOTE
Improving with physical therapy  EMGs were not supportive of sciatica but definitely supportive of fairly significant motor or sensory neuropathy   Patient is already been getting physical therapy prior to the EMG so that may have improved to the sciatica at that time    Continue with PT continue with activity as tolerated

## 2024-01-15 NOTE — PROGRESS NOTES
[facility]  In Office FOLLOW-UP VISIT         Kyree Munguia is a 43 y.o.  male who presents today for the following:    Chief Complaint   Patient presents with    Follow-up     States that migraines are there and starting to increase more and more causing flashing in head coming back as well. Sometimes it is instantly and then sometimes long as 3 to 4 min and sometimes brings on the headache and makes hime sick.           ASSESSMENT AND PLAN  1. Migraine without aura and without status migrainosus, not intractable  Assessment & Plan:  Patient is having worsening headaches I think it has been an escalation over time because he is not effectively treating it with abortive medications.  I just tried to manage it behaviorally which has not been effective    He was given a total of 140 mg of Aimovig today in the office as a one-time dose [we used 2 of the 70 mg doses since we did not have anyone for these in the office]    We reviewed the necessity to treat the migraine when they occur patient is agreed to go back to using his Fioricet as needed and to continue to stay off OTCs which he has been doing  Orders:  -     Erenumab-aooe SOAJ 140 mg; 140 mg, SubCUTAneous, ONCE, On Mon 1/15/24 at 1530, For 1 dose  2. Cervicogenic headache  Assessment & Plan:   MRI scan from 2022 does show some arthritic changes and some stenosis in the cervical region.  This is probably contributing to his headaches.  In addition there is a component of occipital neuralgia.    We are adding physical therapy to try to down regulate symptoms at this time  Orders:  -     Amb External Referral To Physical Therapy  3. Sciatica of left side  Assessment & Plan:   Improving with physical therapy  EMGs were not supportive of sciatica but definitely supportive of fairly significant motor or sensory neuropathy   Patient is already been getting physical therapy prior to the EMG so that may have improved to the

## 2024-01-22 DIAGNOSIS — E11.42 TYPE 2 DIABETES MELLITUS WITH DIABETIC POLYNEUROPATHY, WITH LONG-TERM CURRENT USE OF INSULIN (HCC): ICD-10-CM

## 2024-01-22 DIAGNOSIS — Z79.4 TYPE 2 DIABETES MELLITUS WITH DIABETIC POLYNEUROPATHY, WITH LONG-TERM CURRENT USE OF INSULIN (HCC): ICD-10-CM

## 2024-01-22 RX ORDER — PROCHLORPERAZINE 25 MG/1
SUPPOSITORY RECTAL
Qty: 3 EACH | Refills: 4 | Status: SHIPPED | OUTPATIENT
Start: 2024-01-22

## 2024-02-01 ENCOUNTER — HOSPITAL ENCOUNTER (OUTPATIENT)
Facility: HOSPITAL | Age: 44
Discharge: HOME | End: 2024-02-01
Attending: INTERNAL MEDICINE
Payer: MEDICARE

## 2024-02-01 DIAGNOSIS — R68.81 EARLY SATIETY: ICD-10-CM

## 2024-02-01 DIAGNOSIS — R19.00 ABDOMINAL SWELLING: ICD-10-CM

## 2024-02-01 LAB — CREAT BLD-MCNC: 1.1 MG/DL (ref 0.6–1.3)

## 2024-02-01 PROCEDURE — 6360000004 HC RX CONTRAST MEDICATION

## 2024-02-01 PROCEDURE — 82565 ASSAY OF CREATININE: CPT

## 2024-02-01 PROCEDURE — 74160 CT ABDOMEN W/CONTRAST: CPT

## 2024-02-01 RX ADMIN — IOPAMIDOL 100 ML: 755 INJECTION, SOLUTION INTRAVENOUS at 13:05

## 2024-02-06 ENCOUNTER — OFFICE VISIT (OUTPATIENT)
Age: 44
End: 2024-02-06
Payer: MEDICARE

## 2024-02-06 VITALS
DIASTOLIC BLOOD PRESSURE: 62 MMHG | HEART RATE: 74 BPM | HEIGHT: 69 IN | SYSTOLIC BLOOD PRESSURE: 114 MMHG | WEIGHT: 277.6 LBS | BODY MASS INDEX: 41.12 KG/M2

## 2024-02-06 DIAGNOSIS — K76.0 FATTY LIVER: ICD-10-CM

## 2024-02-06 DIAGNOSIS — E11.42 TYPE 2 DIABETES MELLITUS WITH DIABETIC POLYNEUROPATHY, WITH LONG-TERM CURRENT USE OF INSULIN (HCC): Primary | ICD-10-CM

## 2024-02-06 DIAGNOSIS — Z79.4 TYPE 2 DIABETES MELLITUS WITH DIABETIC POLYNEUROPATHY, WITH LONG-TERM CURRENT USE OF INSULIN (HCC): Primary | ICD-10-CM

## 2024-02-06 DIAGNOSIS — E03.9 ACQUIRED HYPOTHYROIDISM: ICD-10-CM

## 2024-02-06 PROCEDURE — 95251 CONT GLUC MNTR ANALYSIS I&R: CPT | Performed by: INTERNAL MEDICINE

## 2024-02-06 PROCEDURE — 99214 OFFICE O/P EST MOD 30 MIN: CPT | Performed by: INTERNAL MEDICINE

## 2024-02-06 PROCEDURE — 3078F DIAST BP <80 MM HG: CPT | Performed by: INTERNAL MEDICINE

## 2024-02-06 PROCEDURE — G2211 COMPLEX E/M VISIT ADD ON: HCPCS | Performed by: INTERNAL MEDICINE

## 2024-02-06 PROCEDURE — 3074F SYST BP LT 130 MM HG: CPT | Performed by: INTERNAL MEDICINE

## 2024-02-06 RX ORDER — PIOGLITAZONEHYDROCHLORIDE 15 MG/1
15 TABLET ORAL DAILY
Qty: 30 TABLET | Refills: 3 | Status: SHIPPED | OUTPATIENT
Start: 2024-02-06

## 2024-02-06 NOTE — PROGRESS NOTES
Chief Complaint   Patient presents with    Diabetes    Thyroid Problem     Pcp and pharmacy verified     History of Present Illness: Kyree Munguia is a 43 y.o. male here for follow up of diabetes.  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 Alonzo of Cardiology.   Pt had been experiencing more issues of CP and presented to the ED on 11/15/22 and was found to have multi-vessel disease. He was taken to the OR for CORONARY ARTERY BYPASS GRAFT  X 1 WITH LEFT INTERNAL MAMMARY ARTERY to LAD AORTIC VALVE REPLACEMENT  WITH VAIL 25MM INSPIRIS RESILIA TISSUE VALVE    REPAIR OF ASCENDING AORTIC ANEURYSM with 26mm hemashield graft.      He was in the ICU on a vent for several days and was eventually discharged home on 10/9/22.   He called me 10/10/22 and he noted that his BGs were running in the 300-400s.   I instructed him to take Toujeo 140 units once per day (or he can take 70 units BID) and to take Novolin R 30 units with each meal plus 5:50 correction for BG >150.    At our visit in January 2024 his A1C was 10.5%.  His BG were consitantly elevated.   We agreed to continue the Novolin R 42 units with each meal, plus the 5:50 correction for BG >150 and Toujeo 90 units BID, but increase his Trulicity to 3.0mg weekly.      HE did agree to increase his Trulicity to 3.0mg weekly.    Pt is taking the Trulicity 3.0mg weekly, which he notes he has tolerated well.  \"When I take the Trulicity my BG do better, when I miss it my BG run josias high.  He will miss the Trulicity he may miss a week or so per month.      Pt is taking Novolin R 42 units with each meal, plus the 5:50 correction for BG >150 and Toujeo 90 units BID.  \"I have been playing with the 42 units based on my BG and how much I am eating. For a snack I may only take 10 units.\"    I reviewed the last 30 days of his CGM data.              Pt notes he continues to have the feeling

## 2024-02-19 NOTE — PROGRESS NOTES
CHIEF COMPLAINT:  Kyree Munguia is a , 43 y.o. male, ather of 1 son (19 y/o, Kyree), has a girlfriend (Portia) of 8 yrs and is domiciled with mother in her home.Today he is being seen for a scheduled for follow-up appointment to discuss mood symptom management associated with the historical diagnoses of SADE, MDD, recurrent, moderate, and Insomnia with psychotropic medication management. *Last in office- (11/7/23) 9/25/23- No med changes; 8/18/23- No medication changes: Continue Ativan 1 mg daily, Buspar 15 mg BID, and Zoloft 50 mg.6/28/23: No changes to meds        HPI:    (2/20/24) Today, Kyree states, \"I'm doing pretty good.\"  Kyree attended on time and was dressed casually and appropriately for the weather. He is calm, cooperative, and polite. His speech is without pressure and of normal volume and rhythm, yet verbose and requires redirection.   Historically, pt reported depression and anxiety with associated symptoms including- racing thoughts, argumentative, irritable, tense, restless, insomnia, tired, multiple panic attacks, insomnia (trouble initiating asleep) which started after cardiac issues, 2019. He started psychiatric treatment with Dr. Velasquez on 3/24/2020. Around that time, pt reported the recent death of his grandparents, wife's infidelity, and then divorce. His father later passed away in 2020. Pt was started on Cymbalta initially by PCP.   Currently, MOOD is described as \"not too bad\". Pt recently celebrated birthday with family and was enjoyable. Pt is struggling with \"not spending enough time with his son\" and states son's GF is demanding of the son. Pt and GF are doing well. Pt's mood exacerbated by concerns for physical health and chronic pain. Anxiety has improved- pt independently stopped the use of Ativan d/t ineffectiveness and for reasons discussed at prior appts- dependence, wt gain, and possible dementia. Congratulated pt for taking positive steps toward prioritizing overall

## 2024-02-20 ENCOUNTER — OFFICE VISIT (OUTPATIENT)
Age: 44
End: 2024-02-20
Payer: MEDICARE

## 2024-02-20 VITALS
SYSTOLIC BLOOD PRESSURE: 137 MMHG | RESPIRATION RATE: 16 BRPM | WEIGHT: 275.6 LBS | OXYGEN SATURATION: 98 % | HEART RATE: 73 BPM | HEIGHT: 69 IN | TEMPERATURE: 97.6 F | DIASTOLIC BLOOD PRESSURE: 57 MMHG | BODY MASS INDEX: 40.82 KG/M2

## 2024-02-20 DIAGNOSIS — F41.1 GENERALIZED ANXIETY DISORDER: ICD-10-CM

## 2024-02-20 DIAGNOSIS — F43.29 ADJUSTMENT DISORDER WITH OTHER SYMPTOMS: ICD-10-CM

## 2024-02-20 PROCEDURE — 3078F DIAST BP <80 MM HG: CPT | Performed by: NURSE PRACTITIONER

## 2024-02-20 PROCEDURE — 3075F SYST BP GE 130 - 139MM HG: CPT | Performed by: NURSE PRACTITIONER

## 2024-02-20 PROCEDURE — 99214 OFFICE O/P EST MOD 30 MIN: CPT | Performed by: NURSE PRACTITIONER

## 2024-02-20 ASSESSMENT — PATIENT HEALTH QUESTIONNAIRE - PHQ9
9. THOUGHTS THAT YOU WOULD BE BETTER OFF DEAD, OR OF HURTING YOURSELF: 0
SUM OF ALL RESPONSES TO PHQ QUESTIONS 1-9: 8
2. FEELING DOWN, DEPRESSED OR HOPELESS: 1
3. TROUBLE FALLING OR STAYING ASLEEP: 2
SUM OF ALL RESPONSES TO PHQ9 QUESTIONS 1 & 2: 2
8. MOVING OR SPEAKING SO SLOWLY THAT OTHER PEOPLE COULD HAVE NOTICED. OR THE OPPOSITE, BEING SO FIGETY OR RESTLESS THAT YOU HAVE BEEN MOVING AROUND A LOT MORE THAN USUAL: 0
SUM OF ALL RESPONSES TO PHQ QUESTIONS 1-9: 8
6. FEELING BAD ABOUT YOURSELF - OR THAT YOU ARE A FAILURE OR HAVE LET YOURSELF OR YOUR FAMILY DOWN: 0
4. FEELING TIRED OR HAVING LITTLE ENERGY: 2
10. IF YOU CHECKED OFF ANY PROBLEMS, HOW DIFFICULT HAVE THESE PROBLEMS MADE IT FOR YOU TO DO YOUR WORK, TAKE CARE OF THINGS AT HOME, OR GET ALONG WITH OTHER PEOPLE: 1
SUM OF ALL RESPONSES TO PHQ QUESTIONS 1-9: 8
5. POOR APPETITE OR OVEREATING: 2
7. TROUBLE CONCENTRATING ON THINGS, SUCH AS READING THE NEWSPAPER OR WATCHING TELEVISION: 0
1. LITTLE INTEREST OR PLEASURE IN DOING THINGS: 1
SUM OF ALL RESPONSES TO PHQ QUESTIONS 1-9: 8

## 2024-02-20 ASSESSMENT — ANXIETY QUESTIONNAIRES
2. NOT BEING ABLE TO STOP OR CONTROL WORRYING: 0
IF YOU CHECKED OFF ANY PROBLEMS ON THIS QUESTIONNAIRE, HOW DIFFICULT HAVE THESE PROBLEMS MADE IT FOR YOU TO DO YOUR WORK, TAKE CARE OF THINGS AT HOME, OR GET ALONG WITH OTHER PEOPLE: SOMEWHAT DIFFICULT
1. FEELING NERVOUS, ANXIOUS, OR ON EDGE: 1
7. FEELING AFRAID AS IF SOMETHING AWFUL MIGHT HAPPEN: 0
4. TROUBLE RELAXING: 2
5. BEING SO RESTLESS THAT IT IS HARD TO SIT STILL: 1
3. WORRYING TOO MUCH ABOUT DIFFERENT THINGS: 2

## 2024-02-20 NOTE — PROGRESS NOTES
Chief Complaint   Patient presents with    Medication Management     Vitals:    02/20/24 0930   BP: (!) 137/57   Site: Left Upper Arm   Position: Sitting   Cuff Size: Large Adult   Pulse: 73   Resp: 16   Temp: 97.6 °F (36.4 °C)   TempSrc: Oral   SpO2: 98%   Weight: 125 kg (275 lb 9.6 oz)   Height: 1.753 m (5' 9\")     Pain Score: Two    Back    Prior to Admission medications    Medication Sig Start Date End Date Taking? Authorizing Provider   pioglitazone (ACTOS) 15 MG tablet Take 1 tablet by mouth daily 2/6/24  Yes Nicholas Vail MD   Continuous Blood Gluc Sensor (DEXCOM G6 SENSOR) MISC USE TO READ BLOOD SUGARS. CHANGE EVERY 10 DAYS 1/22/24  Yes Nicholas Vail MD   Dulaglutide (TRULICITY) 3 MG/0.5ML SOPN Inject 3 mg into the skin once a week 1/15/24  Yes Nicholas Vail MD   Insulin Glargine, 2 Unit Dial, (TOUJEO MAX SOLOSTAR) 300 UNIT/ML SOPN Take 90 units every morning and 90 units at bedtime. 1/15/24  Yes Nicholas Vail MD   levothyroxine (SYNTHROID) 150 MCG tablet TAKE 1 TABLET BY MOUTH ONCE DAILY IN THE MORNING BEFORE BREAKFAST 1/9/24  Yes Nicholas Vail MD   simethicone (MYLICON) 80 MG chewable tablet Take 1 tablet by mouth every 6 hours as needed for Flatulence 1/2/24  Yes Kike Harriosn MD   betamethasone valerate (VALISONE) 0.1 % ointment Apply topically 2 times daily. 10/30/23  Yes Kike Harrison MD   furosemide (LASIX) 40 MG tablet Take one daily. Take a second dose as needed for excessive edema.  Patient taking differently: 2 times daily 10/10/23  Yes Kike Harrison MD   Multiple Vitamin (THERA/BETA-CAROTENE) TABS Take 1 tablet by mouth daily   Yes Cain Bates MD   LINZESS 72 MCG CAPS capsule  7/20/23  Yes Cain Bates MD   Continuous Blood Gluc Transmit (DEXCOM G6 TRANSMITTER) MISC USE ON SENSOR TO READ BLOOD SUGAR. CHANGE EVERY 90 DAYS 7/28/23  Yes Cain Bates MD   sertraline (ZOLOFT) 100 MG tablet Take 1 tablet by mouth daily 9/25/23  Yes Teja

## 2024-02-21 RX ORDER — BUSPIRONE HYDROCHLORIDE 15 MG/1
15 TABLET ORAL 2 TIMES DAILY
Qty: 60 TABLET | Refills: 2 | Status: SHIPPED | OUTPATIENT
Start: 2024-02-21 | End: 2024-05-21

## 2024-02-21 RX ORDER — SERTRALINE HYDROCHLORIDE 100 MG/1
100 TABLET, FILM COATED ORAL DAILY
Qty: 30 TABLET | Refills: 5 | Status: SHIPPED | OUTPATIENT
Start: 2024-02-21

## 2024-02-23 PROBLEM — I25.2 HISTORY OF ACUTE MYOCARDIAL INFARCTION: Status: ACTIVE | Noted: 2022-11-15

## 2024-02-25 RX ORDER — PROCHLORPERAZINE 25 MG/1
SUPPOSITORY RECTAL
Qty: 1 EACH | Refills: 1 | Status: SHIPPED | OUTPATIENT
Start: 2024-02-25

## 2024-02-26 RX ORDER — PIOGLITAZONEHYDROCHLORIDE 30 MG/1
30 TABLET ORAL DAILY
Qty: 30 TABLET | Refills: 3 | Status: SHIPPED | OUTPATIENT
Start: 2024-02-26

## 2024-02-26 RX ORDER — ACYCLOVIR 400 MG/1
TABLET ORAL
Qty: 1 EACH | Refills: 0 | Status: SHIPPED | OUTPATIENT
Start: 2024-02-26

## 2024-02-26 RX ORDER — ACYCLOVIR 400 MG/1
TABLET ORAL
Qty: 3 EACH | Refills: 5 | Status: SHIPPED | OUTPATIENT
Start: 2024-02-26

## 2024-02-29 ENCOUNTER — PATIENT MESSAGE (OUTPATIENT)
Age: 44
End: 2024-02-29

## 2024-03-19 DIAGNOSIS — E03.9 ACQUIRED HYPOTHYROIDISM: ICD-10-CM

## 2024-03-19 RX ORDER — LEVOTHYROXINE SODIUM 0.15 MG/1
TABLET ORAL
Qty: 30 TABLET | Refills: 0 | Status: SHIPPED | OUTPATIENT
Start: 2024-03-19

## 2024-03-23 DIAGNOSIS — E11.42 TYPE 2 DIABETES MELLITUS WITH DIABETIC POLYNEUROPATHY, WITH LONG-TERM CURRENT USE OF INSULIN (HCC): ICD-10-CM

## 2024-03-23 DIAGNOSIS — Z79.4 TYPE 2 DIABETES MELLITUS WITH DIABETIC POLYNEUROPATHY, WITH LONG-TERM CURRENT USE OF INSULIN (HCC): ICD-10-CM

## 2024-03-23 DIAGNOSIS — I10 ESSENTIAL HYPERTENSION: ICD-10-CM

## 2024-03-25 RX ORDER — FUROSEMIDE 40 MG/1
TABLET ORAL
Qty: 120 TABLET | Refills: 0 | Status: SHIPPED | OUTPATIENT
Start: 2024-03-25

## 2024-03-28 ENCOUNTER — TELEPHONE (OUTPATIENT)
Age: 44
End: 2024-03-28

## 2024-03-28 NOTE — TELEPHONE ENCOUNTER
PA request received for Dexcom G7. Called patient, as there are 2 different insurances listed. Patient states is trying to get his \"insurance straight\". He will call with the new insurance information.   [FreeTextEntry1] : AF - pt continue to have PAF\par - will cont rate control at this time\par - cont metoprolol

## 2024-04-13 ENCOUNTER — OFFICE VISIT (OUTPATIENT)
Age: 44
End: 2024-04-13

## 2024-04-13 VITALS
RESPIRATION RATE: 20 BRPM | WEIGHT: 278 LBS | HEIGHT: 69 IN | OXYGEN SATURATION: 97 % | TEMPERATURE: 98.3 F | DIASTOLIC BLOOD PRESSURE: 68 MMHG | BODY MASS INDEX: 41.18 KG/M2 | SYSTOLIC BLOOD PRESSURE: 149 MMHG | HEART RATE: 91 BPM

## 2024-04-13 DIAGNOSIS — H60.501 ACUTE OTITIS EXTERNA OF RIGHT EAR, UNSPECIFIED TYPE: Primary | ICD-10-CM

## 2024-04-13 RX ORDER — CIPROFLOXACIN AND DEXAMETHASONE 3; 1 MG/ML; MG/ML
4 SUSPENSION/ DROPS AURICULAR (OTIC) 2 TIMES DAILY
Qty: 5 ML | Refills: 0 | Status: SHIPPED | OUTPATIENT
Start: 2024-04-13

## 2024-04-13 ASSESSMENT — ENCOUNTER SYMPTOMS
COUGH: 0
DIARRHEA: 0
SORE THROAT: 0
VOMITING: 0
RHINORRHEA: 0

## 2024-04-13 NOTE — PATIENT INSTRUCTIONS
Also take ibuprofen 800 mg 3 times a day.  If the nasal congestion not better can add Flonase no spray Allegra 180 mg once a day with the saline sinus rinse   no

## 2024-04-13 NOTE — PROGRESS NOTES
Allergen Reactions    Gabapentin Swelling     Of feet    Penicillin G Swelling    Sulfa Antibiotics Swelling    Tramadol Nausea Only     Nausea and headache    Morphine Nausea And Vomiting    Oxycodone-Acetaminophen Hives and Nausea And Vomiting     Pt is allergic to Oxycodone, has previously tolerated Tylenol and Hydrocodone.        Review of Systems   Constitutional:  Negative for chills and fever.   HENT:  Positive for ear pain. Negative for ear discharge, hearing loss, rhinorrhea and sore throat.    Respiratory:  Negative for cough.    Gastrointestinal:  Negative for diarrhea and vomiting.   Musculoskeletal:  Negative for neck pain.   Neurological:  Negative for headaches.   All other systems reviewed and are negative.       BP (!) 149/68 (Site: Right Upper Arm, Position: Sitting, Cuff Size: Large Adult)   Pulse 91   Temp 98.3 °F (36.8 °C) (Oral)   Resp 20   Ht 1.753 m (5' 9\")   Wt 126.1 kg (278 lb)   SpO2 97%   BMI 41.05 kg/m²      Physical Exam  Vitals and nursing note reviewed.   Constitutional:       General: He is not in acute distress.     Appearance: Normal appearance.   HENT:      Right Ear: Swelling (of ear canal) and tenderness present. No drainage. There is no impacted cerumen. No foreign body. No mastoid tenderness. No hemotympanum. Tympanic membrane is not injected.      Left Ear: Tympanic membrane normal. There is no impacted cerumen.      Nose: No congestion or rhinorrhea.      Mouth/Throat:      Pharynx: No oropharyngeal exudate or posterior oropharyngeal erythema.   Eyes:      Conjunctiva/sclera: Conjunctivae normal.   Cardiovascular:      Rate and Rhythm: Normal rate.   Pulmonary:      Effort: Pulmonary effort is normal. No respiratory distress.      Breath sounds: Normal breath sounds. No wheezing, rhonchi or rales.   Lymphadenopathy:      Cervical: No cervical adenopathy.   Skin:     Findings: No rash.   Neurological:      Mental Status: He is alert.          1. Acute otitis externa

## 2024-04-20 DIAGNOSIS — E03.9 ACQUIRED HYPOTHYROIDISM: ICD-10-CM

## 2024-04-22 RX ORDER — LEVOTHYROXINE SODIUM 0.15 MG/1
TABLET ORAL
Qty: 30 TABLET | Refills: 0 | Status: SHIPPED | OUTPATIENT
Start: 2024-04-22

## 2024-04-29 ENCOUNTER — OFFICE VISIT (OUTPATIENT)
Age: 44
End: 2024-04-29
Payer: MEDICAID

## 2024-04-29 VITALS
RESPIRATION RATE: 17 BRPM | SYSTOLIC BLOOD PRESSURE: 124 MMHG | WEIGHT: 281.2 LBS | OXYGEN SATURATION: 98 % | TEMPERATURE: 97.4 F | DIASTOLIC BLOOD PRESSURE: 58 MMHG | HEIGHT: 69 IN | HEART RATE: 67 BPM | BODY MASS INDEX: 41.65 KG/M2

## 2024-04-29 DIAGNOSIS — F43.29 ADJUSTMENT DISORDER WITH OTHER SYMPTOMS: ICD-10-CM

## 2024-04-29 DIAGNOSIS — F41.1 GENERALIZED ANXIETY DISORDER: ICD-10-CM

## 2024-04-29 PROCEDURE — 3074F SYST BP LT 130 MM HG: CPT | Performed by: NURSE PRACTITIONER

## 2024-04-29 PROCEDURE — 99213 OFFICE O/P EST LOW 20 MIN: CPT | Performed by: NURSE PRACTITIONER

## 2024-04-29 PROCEDURE — 3078F DIAST BP <80 MM HG: CPT | Performed by: NURSE PRACTITIONER

## 2024-04-29 RX ORDER — BUSPIRONE HYDROCHLORIDE 15 MG/1
15 TABLET ORAL 2 TIMES DAILY
Qty: 60 TABLET | Refills: 5 | Status: SHIPPED | OUTPATIENT
Start: 2024-05-21 | End: 2024-11-17

## 2024-04-29 RX ORDER — SERTRALINE HYDROCHLORIDE 100 MG/1
100 TABLET, FILM COATED ORAL DAILY
Qty: 30 TABLET | Refills: 5 | Status: SHIPPED | OUTPATIENT
Start: 2024-06-21

## 2024-04-29 ASSESSMENT — PATIENT HEALTH QUESTIONNAIRE - PHQ9
8. MOVING OR SPEAKING SO SLOWLY THAT OTHER PEOPLE COULD HAVE NOTICED. OR THE OPPOSITE, BEING SO FIGETY OR RESTLESS THAT YOU HAVE BEEN MOVING AROUND A LOT MORE THAN USUAL: NOT AT ALL
9. THOUGHTS THAT YOU WOULD BE BETTER OFF DEAD, OR OF HURTING YOURSELF: NOT AT ALL
SUM OF ALL RESPONSES TO PHQ QUESTIONS 1-9: 5
3. TROUBLE FALLING OR STAYING ASLEEP: SEVERAL DAYS
6. FEELING BAD ABOUT YOURSELF - OR THAT YOU ARE A FAILURE OR HAVE LET YOURSELF OR YOUR FAMILY DOWN: NOT AT ALL
SUM OF ALL RESPONSES TO PHQ9 QUESTIONS 1 & 2: 2
1. LITTLE INTEREST OR PLEASURE IN DOING THINGS: SEVERAL DAYS
10. IF YOU CHECKED OFF ANY PROBLEMS, HOW DIFFICULT HAVE THESE PROBLEMS MADE IT FOR YOU TO DO YOUR WORK, TAKE CARE OF THINGS AT HOME, OR GET ALONG WITH OTHER PEOPLE: SOMEWHAT DIFFICULT
SUM OF ALL RESPONSES TO PHQ QUESTIONS 1-9: 5
4. FEELING TIRED OR HAVING LITTLE ENERGY: SEVERAL DAYS
5. POOR APPETITE OR OVEREATING: SEVERAL DAYS
7. TROUBLE CONCENTRATING ON THINGS, SUCH AS READING THE NEWSPAPER OR WATCHING TELEVISION: NOT AT ALL
SUM OF ALL RESPONSES TO PHQ QUESTIONS 1-9: 5
SUM OF ALL RESPONSES TO PHQ QUESTIONS 1-9: 5
2. FEELING DOWN, DEPRESSED OR HOPELESS: SEVERAL DAYS

## 2024-04-29 ASSESSMENT — ANXIETY QUESTIONNAIRES
2. NOT BEING ABLE TO STOP OR CONTROL WORRYING: NOT AT ALL
1. FEELING NERVOUS, ANXIOUS, OR ON EDGE: SEVERAL DAYS
7. FEELING AFRAID AS IF SOMETHING AWFUL MIGHT HAPPEN: SEVERAL DAYS
4. TROUBLE RELAXING: SEVERAL DAYS
GAD7 TOTAL SCORE: 6
3. WORRYING TOO MUCH ABOUT DIFFERENT THINGS: SEVERAL DAYS
5. BEING SO RESTLESS THAT IT IS HARD TO SIT STILL: SEVERAL DAYS
IF YOU CHECKED OFF ANY PROBLEMS ON THIS QUESTIONNAIRE, HOW DIFFICULT HAVE THESE PROBLEMS MADE IT FOR YOU TO DO YOUR WORK, TAKE CARE OF THINGS AT HOME, OR GET ALONG WITH OTHER PEOPLE: SOMEWHAT DIFFICULT
6. BECOMING EASILY ANNOYED OR IRRITABLE: SEVERAL DAYS

## 2024-04-29 NOTE — PROGRESS NOTES
Patient was seen prior to triage. Took vitals only.    Vitals:    04/29/24 1429   BP: (!) 124/58   Pulse: 67   Resp: 17   Temp: 97.4 °F (36.3 °C)   SpO2: 98%     Pain Score: Zero

## 2024-04-29 NOTE — PROGRESS NOTES
CHIEF COMPLAINT:  Kyree Munguia is a , 43 y.o. male, ather of 1 son (17 y/o, Kyree), has a girlfriend (Portia) of 8 yrs and is domiciled with mother in her home.Today he is being seen for a scheduled for follow-up appointment to discuss mood symptom management associated with the historical diagnoses of SADE, MDD, recurrent, moderate, and Insomnia with psychotropic medication management. *Last in office-(2/21/24) Medication changes: Discontinue Ativan- pt independently stopped med 6 weeks prior; (11/7/23) 9/25/23- No med changes; 8/18/23- No medication changes: Continue Ativan 1 mg daily, Buspar 15 mg BID, and Zoloft 50 mg.6/28/23: No changes to meds        HPI:    (4/29/24) Today, Kyree states, \"I'm doing pretty good.\"  Kyree attended on time and was dressed casually and appropriately for the weather. He is calm, cooperative, and polite. His speech is without pressure and of normal volume and rhythm, yet verbose and requires redirection.   Historically, pt reported depression and anxiety with associated symptoms including- racing thoughts, argumentative, irritable, tense, restless, insomnia, tired, multiple panic attacks, insomnia (trouble initiating asleep) which started after cardiac issues, 2019. He started psychiatric treatment with Dr. Velasquez on 3/24/2020. Around that time, pt reported the recent death of his grandparents, wife's infidelity, and then divorce. His father later passed away in 2020. Pt was started on Cymbalta initially by PCP.   Currently, MOOD is described as \"not too bad\". Pt reports more good than bad days. Minimal frustration with wt as he states he is trying to lose wt. ANXIETY is described as (several days) trouble relaxing, feeling on edge or nervous, easily annoyed (drivers), worrying about many things, and feeling restless. DEPRESSIVE sxs are described as (several days) loss of interest, feeling down, low energy, and appetite changes.Currently mood sxs are mild in severity.

## 2024-05-09 PROCEDURE — 99283 EMERGENCY DEPT VISIT LOW MDM: CPT

## 2024-05-10 ENCOUNTER — HOSPITAL ENCOUNTER (EMERGENCY)
Facility: HOSPITAL | Age: 44
Discharge: HOME | End: 2024-05-10
Attending: EMERGENCY MEDICINE
Payer: MEDICAID

## 2024-05-10 VITALS
TEMPERATURE: 98.7 F | OXYGEN SATURATION: 94 % | DIASTOLIC BLOOD PRESSURE: 66 MMHG | BODY MASS INDEX: 42.26 KG/M2 | WEIGHT: 286.16 LBS | RESPIRATION RATE: 16 BRPM | SYSTOLIC BLOOD PRESSURE: 132 MMHG | HEART RATE: 73 BPM

## 2024-05-10 DIAGNOSIS — R63.8 DIETARY INDISCRETION: ICD-10-CM

## 2024-05-10 DIAGNOSIS — R60.0 BILATERAL LOWER EXTREMITY EDEMA: Primary | ICD-10-CM

## 2024-05-10 DIAGNOSIS — E66.9 OBESITY WITH SERIOUS COMORBIDITY, UNSPECIFIED CLASSIFICATION, UNSPECIFIED OBESITY TYPE: ICD-10-CM

## 2024-05-10 LAB
ALBUMIN SERPL-MCNC: 3.4 G/DL (ref 3.5–5)
ALBUMIN/GLOB SERPL: 0.9 (ref 1.1–2.2)
ALP SERPL-CCNC: 157 U/L (ref 45–117)
ALT SERPL-CCNC: 133 U/L (ref 12–78)
ANION GAP SERPL CALC-SCNC: 4 MMOL/L (ref 5–15)
AST SERPL-CCNC: 74 U/L (ref 15–37)
BASOPHILS # BLD: 0 K/UL (ref 0–0.1)
BASOPHILS NFR BLD: 1 % (ref 0–1)
BILIRUB SERPL-MCNC: 0.3 MG/DL (ref 0.2–1)
BUN SERPL-MCNC: 16 MG/DL (ref 6–20)
BUN/CREAT SERPL: 12 (ref 12–20)
CALCIUM SERPL-MCNC: 8.5 MG/DL (ref 8.5–10.1)
CHLORIDE SERPL-SCNC: 103 MMOL/L (ref 97–108)
CO2 SERPL-SCNC: 29 MMOL/L (ref 21–32)
CREAT SERPL-MCNC: 1.33 MG/DL (ref 0.7–1.3)
DIFFERENTIAL METHOD BLD: ABNORMAL
EOSINOPHIL # BLD: 0.2 K/UL (ref 0–0.4)
EOSINOPHIL NFR BLD: 3 % (ref 0–7)
ERYTHROCYTE [DISTWIDTH] IN BLOOD BY AUTOMATED COUNT: 13.9 % (ref 11.5–14.5)
GLOBULIN SER CALC-MCNC: 3.9 G/DL (ref 2–4)
GLUCOSE SERPL-MCNC: 195 MG/DL (ref 65–100)
HCT VFR BLD AUTO: 40.5 % (ref 36.6–50.3)
HGB BLD-MCNC: 13.3 G/DL (ref 12.1–17)
IMM GRANULOCYTES # BLD AUTO: 0.1 K/UL (ref 0–0.04)
IMM GRANULOCYTES NFR BLD AUTO: 2 % (ref 0–0.5)
LYMPHOCYTES # BLD: 2.5 K/UL (ref 0.8–3.5)
LYMPHOCYTES NFR BLD: 34 % (ref 12–49)
MCH RBC QN AUTO: 28.1 PG (ref 26–34)
MCHC RBC AUTO-ENTMCNC: 32.8 G/DL (ref 30–36.5)
MCV RBC AUTO: 85.4 FL (ref 80–99)
MONOCYTES # BLD: 0.6 K/UL (ref 0–1)
MONOCYTES NFR BLD: 7 % (ref 5–13)
NEUTS SEG # BLD: 4.1 K/UL (ref 1.8–8)
NEUTS SEG NFR BLD: 53 % (ref 32–75)
NRBC # BLD: 0 K/UL (ref 0–0.01)
NRBC BLD-RTO: 0 PER 100 WBC
NT PRO BNP: 83 PG/ML
PLATELET # BLD AUTO: 169 K/UL (ref 150–400)
PMV BLD AUTO: 10.3 FL (ref 8.9–12.9)
POTASSIUM SERPL-SCNC: 3.8 MMOL/L (ref 3.5–5.1)
PROT SERPL-MCNC: 7.3 G/DL (ref 6.4–8.2)
RBC # BLD AUTO: 4.74 M/UL (ref 4.1–5.7)
SODIUM SERPL-SCNC: 136 MMOL/L (ref 136–145)
WBC # BLD AUTO: 7.5 K/UL (ref 4.1–11.1)

## 2024-05-10 PROCEDURE — 36415 COLL VENOUS BLD VENIPUNCTURE: CPT

## 2024-05-10 PROCEDURE — 80053 COMPREHEN METABOLIC PANEL: CPT

## 2024-05-10 PROCEDURE — 85025 COMPLETE CBC W/AUTO DIFF WBC: CPT

## 2024-05-10 PROCEDURE — 83880 ASSAY OF NATRIURETIC PEPTIDE: CPT

## 2024-05-10 NOTE — DISCHARGE INSTRUCTIONS
It was a pleasure taking care of you in our Emergency Department today.  We know that when you come to [facility], you are entrusting us with your health, comfort, and safety.  Our physicians and nurses honor that trust, and truly appreciate the opportunity to care for you and your loved ones.    We also value your feedback.  If you receive a survey about your Emergency Department experience today, please fill it out.  We care about our patients' feedback, and we listen to what you have to say.  Thank you!       --- Dr. Vanessa Milan MD

## 2024-05-14 DIAGNOSIS — E03.9 ACQUIRED HYPOTHYROIDISM: ICD-10-CM

## 2024-05-14 RX ORDER — LEVOTHYROXINE SODIUM 0.15 MG/1
TABLET ORAL
Qty: 30 TABLET | Refills: 0 | Status: SHIPPED | OUTPATIENT
Start: 2024-05-14 | End: 2024-05-16 | Stop reason: SDUPTHER

## 2024-05-14 ASSESSMENT — ENCOUNTER SYMPTOMS
NAUSEA: 0
ABDOMINAL PAIN: 0
SHORTNESS OF BREATH: 0
COLOR CHANGE: 0
VOMITING: 0

## 2024-05-14 NOTE — ED PROVIDER NOTES
recommended in the discharge instructions or to return to the Emergency Department should the patient's condition change prior to their follow-up appointment.   The patient and available family and/or caregiver verbally agree with the care plan and all of their questions have been answered. The discharge instructions have also been provided to the them with educational information regarding the patient's diagnosis as well a list of reasons why the patient would want to return to the ER prior to their follow-up appointment should any concerns arise, the patient's condition change or symptoms worsen.    Vanessa Milan MD, Msc    PLAN:     Medication List        CONTINUE taking these medications      * Dexcom G6 Sensor Misc  USE TO READ BLOOD SUGARS. CHANGE EVERY 10 DAYS     * Dexcom G7 Sensor Misc  Change sensor every 10 days. E11.649     Dexcom G6 Transmitter Misc  USE ON SENSOR TO READ BLOOD SUGAR. CHANGE EVERY 90 DAYS     Dexcom G7  Yareli  Use with DexCom G7 sensors. E11.649           * This list has 2 medication(s) that are the same as other medications prescribed for you. Read the directions carefully, and ask your doctor or other care provider to review them with you.                ASK your doctor about these medications      aluminum & magnesium hydroxide-simethicone 200-200-20 MG/5ML Susp suspension  Commonly known as: MAALOX     aspirin 81 MG EC tablet     betamethasone valerate 0.1 % ointment  Commonly known as: VALISONE  Apply topically 2 times daily.     busPIRone 15 MG tablet  Commonly known as: BUSPAR  Take 15 mg by mouth in the morning and at bedtime  Start taking on: May 21, 2024     butalbital-acetaminophen-caffeine -40 MG per tablet  Commonly known as: FIORICET, ESGIC     ciprofloxacin-dexAMETHasone 0.3-0.1 % otic suspension  Commonly known as: CIPRODEX  Place 4 drops into the right ear 2 times daily     clopidogrel 75 MG tablet  Commonly known as: PLAVIX     esomeprazole 40 MG delayed

## 2024-05-15 ENCOUNTER — OFFICE VISIT (OUTPATIENT)
Age: 44
End: 2024-05-15
Payer: MEDICAID

## 2024-05-15 VITALS
BODY MASS INDEX: 41.83 KG/M2 | WEIGHT: 282.4 LBS | HEART RATE: 67 BPM | SYSTOLIC BLOOD PRESSURE: 112 MMHG | HEIGHT: 69 IN | DIASTOLIC BLOOD PRESSURE: 67 MMHG

## 2024-05-15 DIAGNOSIS — Z79.4 TYPE 2 DIABETES MELLITUS WITH DIABETIC POLYNEUROPATHY, WITH LONG-TERM CURRENT USE OF INSULIN (HCC): Primary | ICD-10-CM

## 2024-05-15 DIAGNOSIS — E78.2 MIXED HYPERLIPIDEMIA: ICD-10-CM

## 2024-05-15 DIAGNOSIS — E03.9 ACQUIRED HYPOTHYROIDISM: ICD-10-CM

## 2024-05-15 DIAGNOSIS — E29.1 TESTICULAR HYPOFUNCTION: ICD-10-CM

## 2024-05-15 DIAGNOSIS — E11.42 TYPE 2 DIABETES MELLITUS WITH DIABETIC POLYNEUROPATHY, WITH LONG-TERM CURRENT USE OF INSULIN (HCC): Primary | ICD-10-CM

## 2024-05-15 LAB — HBA1C MFR BLD: 9.7 %

## 2024-05-15 PROCEDURE — 83036 HEMOGLOBIN GLYCOSYLATED A1C: CPT | Performed by: INTERNAL MEDICINE

## 2024-05-15 PROCEDURE — 95251 CONT GLUC MNTR ANALYSIS I&R: CPT | Performed by: INTERNAL MEDICINE

## 2024-05-15 PROCEDURE — 3078F DIAST BP <80 MM HG: CPT | Performed by: INTERNAL MEDICINE

## 2024-05-15 PROCEDURE — 3074F SYST BP LT 130 MM HG: CPT | Performed by: INTERNAL MEDICINE

## 2024-05-15 PROCEDURE — G2211 COMPLEX E/M VISIT ADD ON: HCPCS | Performed by: INTERNAL MEDICINE

## 2024-05-15 PROCEDURE — 99214 OFFICE O/P EST MOD 30 MIN: CPT | Performed by: INTERNAL MEDICINE

## 2024-05-15 RX ORDER — INSULIN GLARGINE 300 U/ML
INJECTION, SOLUTION SUBCUTANEOUS
Qty: 90 ML | Refills: 1 | Status: SHIPPED | OUTPATIENT
Start: 2024-05-15

## 2024-05-15 NOTE — PROGRESS NOTES
Chief Complaint   Patient presents with    Diabetes     Pcp and pharmacy verified    Thyroid Problem     History of Present Illness: Kyree Munguia is a 44 y.o. male here for follow up of diabetes.  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 Alonzo of Cardiology.   Pt had been experiencing more issues of CP and presented to the ED on 11/15/22 and was found to have multi-vessel disease. He was taken to the OR for CORONARY ARTERY BYPASS GRAFT  X 1 WITH LEFT INTERNAL MAMMARY ARTERY to LAD AORTIC VALVE REPLACEMENT  WITH VAIL 25MM INSPIRIS RESILIA TISSUE VALVE    REPAIR OF ASCENDING AORTIC ANEURYSM with 26mm hemashield graft.      He was in the ICU on a vent for several days and was eventually discharged home on 10/9/22.   He called me 10/10/22 and he noted that his BGs were running in the 300-400s.   I instructed him to take Toujeo 140 units once per day (or he can take 70 units BID) and to take Novolin R 30 units with each meal plus 5:50 correction for BG >150.    \"I had an ear infection in April and I was on Abx and steroids and that caused my BG to run higher. I note that my calf muscles and legs have been hurting more since I had the infection. I was in the ER on Thursday, I had a lot of fluid on me and my stomach was messed up. They put me on lasix 3 pills per day and linzess daily, which has helped.    He has not yet seen the liver clinic.    \"I have been drinking more water and my weight has been between 277-280 pounds.    His A1C today was 9.7%.    Pt notes he is still taking the Trulicity 3.0mg weekly \"but I do need to go back to taking it.\"  He is taking Toujeo 90 units BID and Humulin R 30-40 units with each meal, plus corrections.  Pt notes that he takes 20 if he eats less than normal.  Pt is still taking the Actos 30mg daily.    Pt notes that he will have symptomatic hypoglycemia when his BG run in the

## 2024-05-16 DIAGNOSIS — E03.9 ACQUIRED HYPOTHYROIDISM: ICD-10-CM

## 2024-05-16 LAB
ALBUMIN/CREAT UR: 99 MG/G CREAT (ref 0–29)
CHOLEST SERPL-MCNC: 150 MG/DL (ref 100–199)
CREAT UR-MCNC: 169.4 MG/DL
HDLC SERPL-MCNC: 34 MG/DL
IMP & REVIEW OF LAB RESULTS: NORMAL
LDLC SERPL CALC-MCNC: 80 MG/DL (ref 0–99)
LDLC SERPL DIRECT ASSAY-MCNC: 68 MG/DL (ref 0–99)
Lab: NORMAL
MICROALBUMIN UR-MCNC: 167.1 UG/ML
T4 FREE SERPL-MCNC: 1.08 NG/DL (ref 0.82–1.77)
TRIGL SERPL-MCNC: 215 MG/DL (ref 0–149)
TSH SERPL DL<=0.005 MIU/L-ACNC: 5.16 UIU/ML (ref 0.45–4.5)
VLDLC SERPL CALC-MCNC: 36 MG/DL (ref 5–40)

## 2024-05-16 RX ORDER — LEVOTHYROXINE SODIUM 175 UG/1
TABLET ORAL
Qty: 90 TABLET | Refills: 1 | Status: SHIPPED | OUTPATIENT
Start: 2024-05-16

## 2024-05-17 DIAGNOSIS — Z79.4 TYPE 2 DIABETES MELLITUS WITH DIABETIC POLYNEUROPATHY, WITH LONG-TERM CURRENT USE OF INSULIN (HCC): ICD-10-CM

## 2024-05-17 DIAGNOSIS — I10 ESSENTIAL HYPERTENSION: ICD-10-CM

## 2024-05-17 DIAGNOSIS — E11.42 TYPE 2 DIABETES MELLITUS WITH DIABETIC POLYNEUROPATHY, WITH LONG-TERM CURRENT USE OF INSULIN (HCC): ICD-10-CM

## 2024-05-17 RX ORDER — FUROSEMIDE 40 MG/1
TABLET ORAL
Qty: 120 TABLET | Refills: 0 | Status: SHIPPED | OUTPATIENT
Start: 2024-05-17

## 2024-05-22 ENCOUNTER — TELEPHONE (OUTPATIENT)
Age: 44
End: 2024-05-22

## 2024-05-22 DIAGNOSIS — Z79.4 TYPE 2 DIABETES MELLITUS WITH DIABETIC POLYNEUROPATHY, WITH LONG-TERM CURRENT USE OF INSULIN (HCC): ICD-10-CM

## 2024-05-22 DIAGNOSIS — E11.42 TYPE 2 DIABETES MELLITUS WITH DIABETIC POLYNEUROPATHY, WITH LONG-TERM CURRENT USE OF INSULIN (HCC): ICD-10-CM

## 2024-05-22 DIAGNOSIS — I10 ESSENTIAL HYPERTENSION: ICD-10-CM

## 2024-05-22 RX ORDER — FUROSEMIDE 40 MG/1
40 TABLET ORAL 3 TIMES DAILY
Qty: 180 TABLET | Refills: 1 | Status: SHIPPED | OUTPATIENT
Start: 2024-05-22 | End: 2024-08-20

## 2024-05-22 NOTE — TELEPHONE ENCOUNTER
Pt states that Dr. Gibson would like pt seen soon.  Pt is having problem with swelling.      Pt has an appt with Dr. Gibson on 6-5-24 and Dr. Gibson would like pt seen prior to that.    Please call pt to schedule.  Thanks.

## 2024-05-22 NOTE — TELEPHONE ENCOUNTER
furosemide (LASIX) 40 MG tablet    Pt states that between Dr Gibson and the ED at [facility] they changed his lasix to 3/day 2 in the AM and 1 in PM    Pt is out of medication and will need today.     Refill to [facility] [address][phone] [place]

## 2024-05-24 ENCOUNTER — TELEMEDICINE (OUTPATIENT)
Age: 44
End: 2024-05-24

## 2024-05-24 DIAGNOSIS — R10.9 ABDOMINAL DISCOMFORT: Primary | ICD-10-CM

## 2024-05-24 DIAGNOSIS — R60.9 EDEMA, UNSPECIFIED TYPE: ICD-10-CM

## 2024-05-24 DIAGNOSIS — N18.31 CHRONIC KIDNEY DISEASE, STAGE 3A (HCC): ICD-10-CM

## 2024-05-24 DIAGNOSIS — F33.1 MDD (MAJOR DEPRESSIVE DISORDER), RECURRENT EPISODE, MODERATE (HCC): ICD-10-CM

## 2024-05-24 DIAGNOSIS — E10.3553 STABLE PROLIFERATIVE DIABETIC RETINOPATHY OF BOTH EYES ASSOCIATED WITH TYPE 1 DIABETES MELLITUS (HCC): ICD-10-CM

## 2024-05-24 DIAGNOSIS — Z86.73 HISTORY OF STROKE: ICD-10-CM

## 2024-05-24 DIAGNOSIS — E66.01 OBESITY, CLASS III, BMI 40-49.9 (MORBID OBESITY) (HCC): ICD-10-CM

## 2024-05-24 PROBLEM — I25.111 ATHEROSCLEROSIS OF NATIVE CORONARY ARTERY OF NATIVE HEART WITH ANGINA PECTORIS WITH DOCUMENTED SPASM (HCC): Status: RESOLVED | Noted: 2024-05-24 | Resolved: 2024-05-24

## 2024-05-24 PROBLEM — I25.111 ATHEROSCLEROSIS OF NATIVE CORONARY ARTERY OF NATIVE HEART WITH ANGINA PECTORIS WITH DOCUMENTED SPASM (HCC): Status: ACTIVE | Noted: 2024-05-24

## 2024-05-24 PROBLEM — J96.01 ACUTE RESPIRATORY FAILURE WITH HYPOXIA (HCC): Status: RESOLVED | Noted: 2024-05-24 | Resolved: 2024-05-24

## 2024-05-24 PROBLEM — J96.01 ACUTE RESPIRATORY FAILURE WITH HYPOXIA (HCC): Status: ACTIVE | Noted: 2024-05-24

## 2024-05-24 NOTE — PROGRESS NOTES
\"Have you been to the ER, urgent care clinic since your last visit?  Hospitalized since your last visit?\"    NO    “Have you seen or consulted any other health care providers outside of [facility] since your last visit?”    NO        “Have you had a colorectal cancer screening such as a colonoscopy/FIT/Cologuard?    NO    No colonoscopy on file  No cologuard on file  No FIT/FOBT on file   No flexible sigmoidoscopy on file         Click Here for Release of Records Request

## 2024-05-24 NOTE — PROGRESS NOTES
Kyree Munguia is a 44 y.o. male who was seen by synchronous (real-time) audio-video technology on 2024 for Other (Problems with swelling/ weight)        Progress Note       PROGRESS NOTE  Name: Kyree Munguia   : 1980       ASSESSMENT/ PLAN:     Abdominal discomfort: I'll check U/S to evaluate for pathology, such as ascites. He does have ongoing abnormal LFTs.   Leg swelling is ongoing, but worse lately. Likely venous insufficiency. He has had negative echo in . Duplex was ordered in , but not done. As symptoms are bilateral, likely not a blood clot. I'll reorder this, though. Recent labs don't indicate kidney failure or loss of synthetic liver function. Compression and elevation as previously advised. He will be seeing cardiologist soon.   Left sciatica: Following with neurology.   Aortic valve stenosis, etiology of cardiac valve disease unspecified: S/P AVR (aortic valve replacement) and aortoplasty [Z95.2 (ICD-10-CM)] in . Follow up as per CT surgery and Cardiology--Dr. Gibson.   Morbid obesity: I believe he is on GLP 1 agent from endocrinology. Work on diet, exercise.   Hypertension: BP is fine.   Type 2 diabetes with nephropathy (HCC): Follows with Dr. Vail--defer labs to him. He is seeing ophthalmologist for retinopathy.   Coronary artery disease involving native coronary artery of native heart with unstable angina pectoris (HCC): s/p CABG x1. As per Dr. Gibson, cardiology.   Hypertriglyceridemia:  Adjustment disorder with mixed emotional features / Anxiety disorder due to general medical condition with panic attack: Continue follow up with Psychiatry.   Insomnia disorder with non-sleep disorder mental comorbidity: Continue follow up with psychiatrist.   Multiple lacunar infarcts (HCC): Per neurology.   Dizzy spells: Seeing neurology.   Cervical spondylosis with radiculopathy: Ongoing, not relieved with ANNY. Sees Dr. Burroughs, pain management.   GERD: Improved; seeing GI at

## 2024-05-30 ENCOUNTER — CLINICAL DOCUMENTATION (OUTPATIENT)
Age: 44
End: 2024-05-30

## 2024-05-30 NOTE — PROGRESS NOTES
Left message asking patient to call to schedule NP appointment. Referred by Nicholas Vail MD; fatty liver; routine appointment.

## 2024-05-31 ENCOUNTER — HOSPITAL ENCOUNTER (OUTPATIENT)
Facility: HOSPITAL | Age: 44
End: 2024-05-31
Attending: INTERNAL MEDICINE
Payer: MEDICARE

## 2024-05-31 DIAGNOSIS — R60.9 EDEMA, UNSPECIFIED TYPE: ICD-10-CM

## 2024-05-31 DIAGNOSIS — R10.9 ABDOMINAL DISCOMFORT: ICD-10-CM

## 2024-05-31 PROCEDURE — 93970 EXTREMITY STUDY: CPT

## 2024-05-31 PROCEDURE — 76700 US EXAM ABDOM COMPLETE: CPT

## 2024-06-03 ENCOUNTER — PATIENT MESSAGE (OUTPATIENT)
Age: 44
End: 2024-06-03

## 2024-06-04 NOTE — TELEPHONE ENCOUNTER
From: Kyree Munguia  To: Dr. Kike Harrison  Sent: 6/3/2024 7:24 PM EDT  Subject: Test results on stomach and legs    Dr. Harrison,  Good Afternoon Dr. Harrison I was wondering if you went over the results of the test that I had done on last Friday? I was wondering what they said and looked like.   Thank you,   Kyree Munguia

## 2024-06-07 ENCOUNTER — TELEPHONE (OUTPATIENT)
Age: 44
End: 2024-06-07

## 2024-06-26 ENCOUNTER — OFFICE VISIT (OUTPATIENT)
Age: 44
End: 2024-06-26
Payer: MEDICARE

## 2024-06-26 VITALS
TEMPERATURE: 97.9 F | HEART RATE: 76 BPM | DIASTOLIC BLOOD PRESSURE: 60 MMHG | BODY MASS INDEX: 42.83 KG/M2 | HEIGHT: 69 IN | RESPIRATION RATE: 17 BRPM | OXYGEN SATURATION: 97 % | SYSTOLIC BLOOD PRESSURE: 133 MMHG | WEIGHT: 289.2 LBS

## 2024-06-26 DIAGNOSIS — F41.1 GENERALIZED ANXIETY DISORDER: ICD-10-CM

## 2024-06-26 DIAGNOSIS — F33.0 MILD EPISODE OF RECURRENT MAJOR DEPRESSIVE DISORDER (HCC): Primary | ICD-10-CM

## 2024-06-26 PROCEDURE — 99214 OFFICE O/P EST MOD 30 MIN: CPT | Performed by: NURSE PRACTITIONER

## 2024-06-26 PROCEDURE — 3078F DIAST BP <80 MM HG: CPT | Performed by: NURSE PRACTITIONER

## 2024-06-26 PROCEDURE — 3075F SYST BP GE 130 - 139MM HG: CPT | Performed by: NURSE PRACTITIONER

## 2024-06-26 ASSESSMENT — PATIENT HEALTH QUESTIONNAIRE - PHQ9
SUM OF ALL RESPONSES TO PHQ QUESTIONS 1-9: 5
6. FEELING BAD ABOUT YOURSELF - OR THAT YOU ARE A FAILURE OR HAVE LET YOURSELF OR YOUR FAMILY DOWN: NOT AT ALL
7. TROUBLE CONCENTRATING ON THINGS, SUCH AS READING THE NEWSPAPER OR WATCHING TELEVISION: NOT AT ALL
3. TROUBLE FALLING OR STAYING ASLEEP: SEVERAL DAYS
1. LITTLE INTEREST OR PLEASURE IN DOING THINGS: SEVERAL DAYS
4. FEELING TIRED OR HAVING LITTLE ENERGY: SEVERAL DAYS
8. MOVING OR SPEAKING SO SLOWLY THAT OTHER PEOPLE COULD HAVE NOTICED. OR THE OPPOSITE, BEING SO FIGETY OR RESTLESS THAT YOU HAVE BEEN MOVING AROUND A LOT MORE THAN USUAL: NOT AT ALL
SUM OF ALL RESPONSES TO PHQ QUESTIONS 1-9: 5
9. THOUGHTS THAT YOU WOULD BE BETTER OFF DEAD, OR OF HURTING YOURSELF: NOT AT ALL
5. POOR APPETITE OR OVEREATING: SEVERAL DAYS
SUM OF ALL RESPONSES TO PHQ QUESTIONS 1-9: 5
2. FEELING DOWN, DEPRESSED OR HOPELESS: SEVERAL DAYS
10. IF YOU CHECKED OFF ANY PROBLEMS, HOW DIFFICULT HAVE THESE PROBLEMS MADE IT FOR YOU TO DO YOUR WORK, TAKE CARE OF THINGS AT HOME, OR GET ALONG WITH OTHER PEOPLE: NOT DIFFICULT AT ALL
SUM OF ALL RESPONSES TO PHQ QUESTIONS 1-9: 5
SUM OF ALL RESPONSES TO PHQ9 QUESTIONS 1 & 2: 2

## 2024-06-26 ASSESSMENT — ANXIETY QUESTIONNAIRES
7. FEELING AFRAID AS IF SOMETHING AWFUL MIGHT HAPPEN: SEVERAL DAYS
1. FEELING NERVOUS, ANXIOUS, OR ON EDGE: SEVERAL DAYS
2. NOT BEING ABLE TO STOP OR CONTROL WORRYING: SEVERAL DAYS
6. BECOMING EASILY ANNOYED OR IRRITABLE: SEVERAL DAYS
IF YOU CHECKED OFF ANY PROBLEMS ON THIS QUESTIONNAIRE, HOW DIFFICULT HAVE THESE PROBLEMS MADE IT FOR YOU TO DO YOUR WORK, TAKE CARE OF THINGS AT HOME, OR GET ALONG WITH OTHER PEOPLE: NOT DIFFICULT AT ALL
4. TROUBLE RELAXING: SEVERAL DAYS
5. BEING SO RESTLESS THAT IT IS HARD TO SIT STILL: SEVERAL DAYS
GAD7 TOTAL SCORE: 7
3. WORRYING TOO MUCH ABOUT DIFFERENT THINGS: SEVERAL DAYS

## 2024-06-26 NOTE — PROGRESS NOTES
Chief Complaint   Patient presents with    Medication Management     Vitals:    06/26/24 1050   BP: 133/60   Site: Left Upper Arm   Position: Sitting   Cuff Size: Large Adult   Pulse: 76   Resp: 17   Temp: 97.9 °F (36.6 °C)   TempSrc: Oral   SpO2: 97%   Weight: 131.2 kg (289 lb 3.2 oz)   Height: 1.753 m (5' 9\")     Pain Score: Two    Prior to Admission medications    Medication Sig Start Date End Date Taking? Authorizing Provider   furosemide (LASIX) 40 MG tablet Take 1 tablet by mouth 3 times daily 5/22/24 8/20/24 Yes Kike Harrison MD   levothyroxine (SYNTHROID) 175 MCG tablet Take one pill every morning. Take first thing in the morning, by itself with water only. Wait 30 minutes before you eat or take any other pills. 5/16/24  Yes Nicholas Vail MD   Insulin Glargine, 2 Unit Dial, (TOUJEO MAX SOLOSTAR) 300 UNIT/ML SOPN Take 90 units every morning and 90 units at bedtime. 5/15/24  Yes Nicholas Vail MD   insulin regular (HUMULIN R) 100 UNIT/ML injection 42 units with breakfast, 42 units with lunch and 42 units with dinner, plus correction, max daily dose 200 units 5/15/24  Yes Nicholas Vail MD   sertraline (ZOLOFT) 100 MG tablet Take 1 tablet by mouth daily 6/21/24  Yes Elizabeth Lezama APRN - CNP   sertraline (ZOLOFT) 50 MG tablet Take 1 tablet by mouth daily 6/21/24  Yes Elizabeth Lezama APRN - CNP   busPIRone (BUSPAR) 15 MG tablet Take 15 mg by mouth in the morning and at bedtime 5/21/24 11/17/24 Yes Elizabeth Lezama APRN - CNP   Continuous Blood Gluc Sensor (DEXCOM G7 SENSOR) MISC Change sensor every 10 days. E11.649 2/26/24  Yes Nicholas Vail MD   Continuous Blood Gluc  (DEXCOM G7 ) RIVER Use with DexCom G7 sensors. E11.649 2/26/24  Yes Nicholas Vail MD   simethicone (MYLICON) 80 MG chewable tablet Take 1 tablet by mouth every 6 hours as needed for Flatulence  Patient taking differently: Take 1 tablet by mouth in the morning and at bedtime 1/2/24  Yes Kike Harrison MD   Multiple 
tablet by mouth daily (Patient not taking: Reported on 6/26/2024) 30 tablet 3     No current facility-administered medications for this visit.      1.   No  medication changes: Will continue Zoloft 150 mg daily and Buspar 5 mg BID. Follow-up in 4 months or sooner if needed. Pt aware of providers departure from practice.      2.  Counseling and coordination of care including instructions for treatment, risks/benefits, risk factor reduction and patient education provided.  Kyree agrees with the current treatment plan and was instructed to call with any side effects, questions or concerns.      3. Discussed the importance of healthy diet choices and regular exercise to prevent weight gain; pt verbalized understanding.     4. Pt provided with recommendations to improve sleep hygiene     5. Psychoeducation provided about the nature of chronic psychiatric illness and the importance of being on medication to prevent relapse of symptoms and to be able to continue functioning in the community.     6. Pt educated not to stop any MH medications without first informing provider.     7. Patient denies having suicidal or homicidal thoughts and based on the evidence available at time of evaluation, presents as low risk of imminent harm to self and others. Patient is future-oriented and treatment seeking and agrees to call 911 or present to the ED if they develop thoughts of harm to self or others. Pt provided National Suicide Hotline- 710.791.2553, Behavioral Health EmergencySuicide- 988; RAMONE \"WARM\" Line- 1-223.870.1210     8.  Patient was given the full opportunity to ask questions, voice any concerns and was encouraged to take an active role in his care and overall treatment regimen. We also discussed the proposed/continued treatment options available to him, alternatives, there risks vs. benefits, the likelihood of success and based upon the information provided he mutually agreed with the proposed treatment plan.     9.

## 2024-07-03 DIAGNOSIS — I87.2 VENOUS STASIS DERMATITIS OF BOTH LOWER EXTREMITIES: ICD-10-CM

## 2024-07-11 NOTE — TELEPHONE ENCOUNTER
Labs looks a little better but his triglycerides are still very high.   We will need to switch around his therapy just a little bit to achieve desired result    Stop gemfibrozil (this part is very important)    Start pravastatin 40 mg  Start TriCor 145 mg     Sent these to [address] [place] [address] Orthopaedic Instructions:  -Weight bearing status: Activity as tolerated  -Starting three days after surgery, okay for daily dressing changes until wound/surgical incision site is dry. Dressing changes can be performed with simple Band-aids or gauze pads secured with tape/ace bandages. Once you no longer see drainage from your wounds on your dressings, it is okay to shower. Do not scrub vigorously, just let water run over wound/surgical sites. Additionally, one no longer needs to change dressings daily. It is important that you do not soak the wound/incision site underwater, though. This includes baths, hot tubs, swimming pools, etc.  -Ice (20 minutes on and off 1 hour) to reduce swelling and throbbing pain.  -Should urinate within 8 hours of surgery.  -Call the office or come to Emergency Room if signs of infection appear (hot, swollen, red, draining pus, fever)  -Take medications as prescribed.  -Wean off narcotics (percocet/norco) as soon as possible. Do not take tylenol if still taking narcotics.  -Follow up with Dr. Rogers in his office 10-14 days after surgery. Call [phone] to schedule/confirm.

## 2024-07-24 ENCOUNTER — HOSPITAL ENCOUNTER (EMERGENCY)
Facility: HOSPITAL | Age: 44
Discharge: HOME | End: 2024-07-24
Attending: EMERGENCY MEDICINE
Payer: MEDICARE

## 2024-07-24 VITALS
OXYGEN SATURATION: 94 % | RESPIRATION RATE: 20 BRPM | WEIGHT: 291.01 LBS | BODY MASS INDEX: 43.1 KG/M2 | TEMPERATURE: 98.1 F | HEIGHT: 69 IN | SYSTOLIC BLOOD PRESSURE: 132 MMHG | DIASTOLIC BLOOD PRESSURE: 62 MMHG | HEART RATE: 76 BPM

## 2024-07-24 DIAGNOSIS — S39.012A STRAIN OF LUMBAR REGION, INITIAL ENCOUNTER: Primary | ICD-10-CM

## 2024-07-24 PROCEDURE — 99283 EMERGENCY DEPT VISIT LOW MDM: CPT

## 2024-07-24 PROCEDURE — 6370000000 HC RX 637 (ALT 250 FOR IP): Performed by: EMERGENCY MEDICINE

## 2024-07-24 RX ORDER — METHOCARBAMOL 750 MG/1
750 TABLET, FILM COATED ORAL 4 TIMES DAILY PRN
Qty: 20 TABLET | Refills: 0 | Status: SHIPPED | OUTPATIENT
Start: 2024-07-24 | End: 2024-07-29

## 2024-07-24 RX ORDER — IBUPROFEN 600 MG/1
600 TABLET ORAL
Status: COMPLETED | OUTPATIENT
Start: 2024-07-24 | End: 2024-07-24

## 2024-07-24 RX ORDER — METHOCARBAMOL 750 MG/1
750 TABLET, FILM COATED ORAL
Status: COMPLETED | OUTPATIENT
Start: 2024-07-24 | End: 2024-07-24

## 2024-07-24 RX ORDER — NAPROXEN 500 MG/1
500 TABLET ORAL 2 TIMES DAILY
Qty: 10 TABLET | Refills: 0 | Status: SHIPPED | OUTPATIENT
Start: 2024-07-24 | End: 2024-07-29

## 2024-07-24 RX ORDER — PREDNISONE 10 MG/1
TABLET ORAL
Qty: 1 EACH | Refills: 0 | Status: SHIPPED | OUTPATIENT
Start: 2024-07-24

## 2024-07-24 RX ORDER — LIDOCAINE 4 G/G
1 PATCH TOPICAL
Status: DISCONTINUED | OUTPATIENT
Start: 2024-07-24 | End: 2024-07-25 | Stop reason: HOSPADM

## 2024-07-24 RX ORDER — PREDNISONE 20 MG/1
60 TABLET ORAL
Status: COMPLETED | OUTPATIENT
Start: 2024-07-24 | End: 2024-07-24

## 2024-07-24 RX ORDER — LIDOCAINE 50 MG/G
1 PATCH TOPICAL DAILY
Qty: 10 PATCH | Refills: 0 | Status: SHIPPED | OUTPATIENT
Start: 2024-07-24 | End: 2024-08-03

## 2024-07-24 RX ADMIN — PREDNISONE 60 MG: 20 TABLET ORAL at 22:53

## 2024-07-24 RX ADMIN — IBUPROFEN 600 MG: 600 TABLET, FILM COATED ORAL at 22:53

## 2024-07-24 RX ADMIN — METHOCARBAMOL TABLETS 750 MG: 750 TABLET, COATED ORAL at 22:53

## 2024-07-24 ASSESSMENT — PAIN SCALES - GENERAL: PAINLEVEL_OUTOF10: 8

## 2024-07-24 ASSESSMENT — PAIN - FUNCTIONAL ASSESSMENT: PAIN_FUNCTIONAL_ASSESSMENT: 0-10

## 2024-07-25 NOTE — DISCHARGE INSTRUCTIONS
It was a pleasure taking care of you in our Emergency Department today.  We know that when you come to [facility], you are entrusting us with your health, comfort, and safety.  Our physicians and nurses honor that trust, and truly appreciate the opportunity to care for you and your loved ones.    We also value your feedback.  If you receive a survey about your Emergency Department experience today, please fill it out.  We care about our patients' feedback, and we listen to what you have to say.  Thank you!       --- Dr. Vanessa Milan MD

## 2024-07-25 NOTE — ED PROVIDER NOTES
[facility] EMERGENCY DEPT  EMERGENCY DEPARTMENT ENCOUNTER         Pt Name: Kyree Munguia  MRN: [id number]  Birthdate 1980  Date of evaluation: 7/24/2024  Provider: Vanessa Milan MD   PCP: Kike Harrison MD  Note Started: 10:51 PM 7/24/24     CHIEF COMPLAINT       Chief Complaint   Patient presents with    Back Pain     Patient arrives ambulatory to triage with complaint of mid to lower back pain that has been going on for a few days. Patient denies any recent injury and reports that his back has been \"locking up\" and it is now effecting his legs.         HISTORY OF PRESENT ILLNESS: 1 or more elements      History From: {SAHPI:50282}  HPI Limitations: {HPI Limitations (Optional):44360}     Kyree Munguia is a 44 y.o. male who presents ***  Diffuse lumbar pain started several days ago after doing some yard work    Please see more comprehensive history below under MDM  Nursing Notes were all reviewed in real time as they are made available. Any disagreements addressed in the HPI/MDM.     REVIEW OF SYSTEMS      Review of Systems     Positives and Pertinent negatives as per HPI and MDM.    PAST HISTORY     Past Medical History:  Past Medical History:   Diagnosis Date    Acute non-Q wave non-ST elevation myocardial infarction (NSTEMI) (Former[facility]) 11/15/2022    Anxiety and depression     anxiety, depression    Bleeding of eye, left     8/17/21 pt reports receiving injections in right eye for beeding    Chronic headaches 2007    COVID-19 12/20/2020    Diabetes type 1, uncontrolled     since 6 years old    GERD (gastroesophageal reflux disease)     Hypercholesterolemia     Hypertension     Hypothyroidism     MI (myocardial infarction) ([facility])     as of 8/17/21 pt reports 9 stents total    NAFLD (nonalcoholic fatty liver disease)     MADYSON on CPAP        Past Surgical History:  Past Surgical History:   Procedure Laterality Date    CARDIAC CATHETERIZATION      CARDIAC VALVE SURGERY  11/22/2022    CARPAL TUNNEL RELEASE Right 2018

## 2024-07-29 ASSESSMENT — ENCOUNTER SYMPTOMS
SHORTNESS OF BREATH: 0
BACK PAIN: 1
VOMITING: 0
NAUSEA: 0
COUGH: 0
COLOR CHANGE: 0
ABDOMINAL PAIN: 0

## 2024-07-30 RX ORDER — DULAGLUTIDE 4.5 MG/.5ML
4.5 INJECTION, SOLUTION SUBCUTANEOUS WEEKLY
Qty: 2 ML | Refills: 3 | Status: SHIPPED | OUTPATIENT
Start: 2024-07-30

## 2024-08-28 RX ORDER — SODIUM CHLORIDE 0.9 % (FLUSH) 0.9 %
5-40 SYRINGE (ML) INJECTION EVERY 12 HOURS SCHEDULED
Status: DISCONTINUED | OUTPATIENT
Start: 2024-08-28 | End: 2024-08-29 | Stop reason: HOSPADM

## 2024-08-28 RX ORDER — SODIUM CHLORIDE 9 MG/ML
25 INJECTION, SOLUTION INTRAVENOUS PRN
Status: DISCONTINUED | OUTPATIENT
Start: 2024-08-28 | End: 2024-08-29 | Stop reason: HOSPADM

## 2024-08-28 RX ORDER — SODIUM CHLORIDE 0.9 % (FLUSH) 0.9 %
5-40 SYRINGE (ML) INJECTION PRN
Status: DISCONTINUED | OUTPATIENT
Start: 2024-08-28 | End: 2024-08-29 | Stop reason: HOSPADM

## 2024-08-28 NOTE — PROGRESS NOTES
Patient verbalizes receipt of preparatory instructions. Patient had questions regarding his insulin directions on the day of surgery. Patient was instructed to not take his Insulin Humulin on the day of surgery and to take a 1/2 dose of his insulin Glargine on the day of surgery. Per patient he no longer takes Trulicity or Actos for his Diabetes. Patient repeated this question regarding his Diabetic medications a couple times throughout the phone call.

## 2024-08-29 ENCOUNTER — HOSPITAL ENCOUNTER (OUTPATIENT)
Facility: HOSPITAL | Age: 44
Setting detail: OUTPATIENT SURGERY
Discharge: HOME | End: 2024-08-29
Attending: INTERNAL MEDICINE | Admitting: INTERNAL MEDICINE
Payer: MEDICARE

## 2024-08-29 ENCOUNTER — ANESTHESIA (OUTPATIENT)
Facility: HOSPITAL | Age: 44
End: 2024-08-29
Payer: MEDICARE

## 2024-08-29 ENCOUNTER — ANESTHESIA EVENT (OUTPATIENT)
Facility: HOSPITAL | Age: 44
End: 2024-08-29
Payer: MEDICARE

## 2024-08-29 VITALS
SYSTOLIC BLOOD PRESSURE: 129 MMHG | HEART RATE: 72 BPM | DIASTOLIC BLOOD PRESSURE: 60 MMHG | WEIGHT: 282.2 LBS | TEMPERATURE: 97.9 F | BODY MASS INDEX: 41.8 KG/M2 | HEIGHT: 69 IN | RESPIRATION RATE: 16 BRPM | OXYGEN SATURATION: 95 %

## 2024-08-29 LAB
GLUCOSE BLD STRIP.AUTO-MCNC: 200 MG/DL (ref 65–117)
SERVICE CMNT-IMP: ABNORMAL

## 2024-08-29 PROCEDURE — 7100000011 HC PHASE II RECOVERY - ADDTL 15 MIN: Performed by: INTERNAL MEDICINE

## 2024-08-29 PROCEDURE — 3600007502: Performed by: INTERNAL MEDICINE

## 2024-08-29 PROCEDURE — 2709999900 HC NON-CHARGEABLE SUPPLY: Performed by: INTERNAL MEDICINE

## 2024-08-29 PROCEDURE — 7100000010 HC PHASE II RECOVERY - FIRST 15 MIN: Performed by: INTERNAL MEDICINE

## 2024-08-29 PROCEDURE — 2580000003 HC RX 258

## 2024-08-29 PROCEDURE — 82962 GLUCOSE BLOOD TEST: CPT

## 2024-08-29 PROCEDURE — 88305 TISSUE EXAM BY PATHOLOGIST: CPT

## 2024-08-29 PROCEDURE — 3700000000 HC ANESTHESIA ATTENDED CARE: Performed by: INTERNAL MEDICINE

## 2024-08-29 PROCEDURE — 3600007512: Performed by: INTERNAL MEDICINE

## 2024-08-29 PROCEDURE — 3700000001 HC ADD 15 MINUTES (ANESTHESIA): Performed by: INTERNAL MEDICINE

## 2024-08-29 PROCEDURE — 6360000002 HC RX W HCPCS

## 2024-08-29 PROCEDURE — 2500000003 HC RX 250 WO HCPCS

## 2024-08-29 RX ORDER — SODIUM CHLORIDE 9 MG/ML
INJECTION, SOLUTION INTRAVENOUS CONTINUOUS PRN
Status: DISCONTINUED | OUTPATIENT
Start: 2024-08-29 | End: 2024-08-29 | Stop reason: SDUPTHER

## 2024-08-29 RX ORDER — LIDOCAINE HYDROCHLORIDE 20 MG/ML
INJECTION, SOLUTION EPIDURAL; INFILTRATION; INTRACAUDAL; PERINEURAL PRN
Status: DISCONTINUED | OUTPATIENT
Start: 2024-08-29 | End: 2024-08-29 | Stop reason: SDUPTHER

## 2024-08-29 RX ADMIN — SODIUM CHLORIDE: 900 INJECTION, SOLUTION INTRAVENOUS at 10:26

## 2024-08-29 RX ADMIN — PROPOFOL 50 MG: 10 INJECTION, EMULSION INTRAVENOUS at 10:46

## 2024-08-29 RX ADMIN — PROPOFOL 50 MG: 10 INJECTION, EMULSION INTRAVENOUS at 10:51

## 2024-08-29 RX ADMIN — SODIUM CHLORIDE: 900 INJECTION, SOLUTION INTRAVENOUS at 10:29

## 2024-08-29 RX ADMIN — PROPOFOL 50 MG: 10 INJECTION, EMULSION INTRAVENOUS at 10:32

## 2024-08-29 RX ADMIN — PROPOFOL 50 MG: 10 INJECTION, EMULSION INTRAVENOUS at 10:42

## 2024-08-29 RX ADMIN — PROPOFOL 40 MG: 10 INJECTION, EMULSION INTRAVENOUS at 10:38

## 2024-08-29 RX ADMIN — PROPOFOL 50 MG: 10 INJECTION, EMULSION INTRAVENOUS at 10:35

## 2024-08-29 RX ADMIN — PROPOFOL 50 MG: 10 INJECTION, EMULSION INTRAVENOUS at 10:55

## 2024-08-29 RX ADMIN — LIDOCAINE HYDROCHLORIDE 100 MG: 20 INJECTION, SOLUTION EPIDURAL; INFILTRATION; INTRACAUDAL; PERINEURAL at 10:29

## 2024-08-29 RX ADMIN — PROPOFOL 100 MG: 10 INJECTION, EMULSION INTRAVENOUS at 10:29

## 2024-08-29 ASSESSMENT — ENCOUNTER SYMPTOMS
SHORTNESS OF BREATH: 1
DYSPNEA ACTIVITY LEVEL: NO INTERVAL CHANGE

## 2024-08-29 ASSESSMENT — PAIN - FUNCTIONAL ASSESSMENT: PAIN_FUNCTIONAL_ASSESSMENT: 0-10

## 2024-08-29 NOTE — ANESTHESIA POSTPROCEDURE EVALUATION
Department of Anesthesiology  Postprocedure Note    Patient: Kyree Munguia  MRN: [id number]  YOB: 1980  Date of evaluation: 8/29/2024    Procedure Summary       Date: 08/29/24 Room / [facility]: [facility] ENDO 01 / [facility]    Anesthesia Start: 1026 Anesthesia Stop: 1104    Procedures:       ESOPHAGOGASTRODUODENOSCOPY      COLONOSCOPY Diagnosis:       Dyspepsia      Screening for colon cancer      Benign neoplasm of transverse colon      Benign neoplasm of descending colon      Benign neoplasm of sigmoid colon      (Dyspepsia [R10.13])      (Screening for colon cancer [Z12.11])    Surgeons: Tremaine Escobar MD Responsible Provider: Kori Marshall MD    Anesthesia Type: MAC ASA Status: 3            Anesthesia Type: MAC    David Phase I: David Score: 10    David Phase II: David Score: 10    Anesthesia Post Evaluation    Patient location during evaluation: bedside  Patient participation: complete - patient participated  Level of consciousness: awake and alert  Pain scale: Controlled per protocol.  Airway patency: patent  Nausea & Vomiting: no nausea and no vomiting  Cardiovascular status: hemodynamically stable  Respiratory status: acceptable  Hydration status: stable  Multimodal analgesia pain management approach  Pain management: adequate    No notable events documented.

## 2024-08-29 NOTE — OP NOTE
NAME:  Kyree Munguia   :   1980   MRN:   [id number]     Date/Time:  2024 11:09 AM    Esophagogastroduodenoscopy (EGD) Procedure Note    Procedure: Esophagogastroduodenoscopy with biopsy    Indication: dyspepsia  Pre-operative Diagnosis: see indication above  Post-operative Diagnosis: see findings below  :  Tremaine Escobar MD  Referring Provider:   -Kike Harrison MD    Exam:  Airway: clear, no airway problems anticipated  Heart: RRR, without gallops or rubs  Lungs: clear bilaterally without wheezes, crackles, or rhonchi  Abdomen: soft, nontender, nondistended, bowel sounds present  Mental Status: awake, alert and oriented to person, place and time     Anethesia/Sedation:  MAC anesthesia Propofol as per colonoscopy  Procedure Details   After informed consent was obtained for the procedure, with all risks and benefits of procedure explained the patient was taken to the endoscopy suite and placed in the left lateral decubitus position.  Following sequential administration of sedation as per above, the [id number] gastroscope was inserted into the mouth and advanced under direct vision to third portion of the duodenum.  A careful inspection was made as the gastroscope was withdrawn, including a retroflexed view of the proximal stomach; findings and interventions are described below.                  Findings:    -Normal esophageal mucosa; biopsied to exclude active inflammation  -Normal stomach mucosa; biopsied to exclude inflammation  -Normal duodenal mucosa    Therapies:  biopsy of esophagus; biopsy of stomach   Specimens: #1 gastric; #2 esoph  EBL:  None.         Complications:   None; patient tolerated the procedure well.           Impression:    -Normal esophageal mucosa; biopsied to exclude active inflammation  -Normal stomach mucosa; biopsied to exclude inflammation  -Normal duodenal mucosa    Recommendations:  -Await pathology., -Follow symptoms., -Proceed to colonoscopy    Discharge 
the descending colon at 60cm; removed with cold snare  -Single 2mm sessile polyp in the transverse colon at 75cm; removed with cold snare    Recommendations: --Await pathology., -Repeat colonoscopy in 5 years. High fiber diet.  Resume normal medication(s).  Resume Plavix in 2 days; -You will receive a letter about the biopsy results in about 10 days.  You may be asked to call your doctor's office for the results.       Discharge Disposition:  Home in the company of a  when able to ambulate.      Tremaine Escobar MD

## 2024-08-29 NOTE — H&P
Gastroenterology Outpatient History and Physical    Patient: Kyree MORIS Munguia    Physician: Tremaine Escobar MD    Chief Complaint: dyspepsia and, chronic constipation, and need for CRC screening.  History of Present Illness: 43yo M with   dyspepsia and, chronic constipation, and need for CRC screening..  No prior colonoscopy  History:  Past Medical History:   Diagnosis Date    Acute non-Q wave non-ST elevation myocardial infarction (NSTEMI) ([facility]) 11/15/2022    Anxiety and depression     anxiety, depression    Bleeding of eye, left     8/17/21 pt reports receiving injections in right eye for beeding    CAD (coronary artery disease)     Chronic headaches 2007    COVID-19 12/20/2020    Diabetes type 1, uncontrolled     since 6 years old    GERD (gastroesophageal reflux disease)     Hypercholesterolemia     Hypertension     Hypothyroidism     MI (myocardial infarction) ([facility])     as of 8/17/21 pt reports 9 stents total    NAFLD (nonalcoholic fatty liver disease)     MADYSON on CPAP     no longer using CPAP      Past Surgical History:   Procedure Laterality Date    CABG WITH AORTIC VALVE REPLACEMENT      CARDIAC CATHETERIZATION      CARDIAC VALVE SURGERY  11/22/2022    CARPAL TUNNEL RELEASE Right 2018    CTE trigger thumb and index finger    CARPAL TUNNEL RELEASE Left 2012    CHOLECYSTECTOMY, LAPAROSCOPIC  08/26/2014    Dr Julian Ospina    CORONARY ANEURYSM REPAIR      REFRACTIVE SURGERY Bilateral     WISDOM TOOTH EXTRACTION        Social History     Socioeconomic History    Marital status:      Spouse name: None    Number of children: None    Years of education: None    Highest education level: None   Tobacco Use    Smoking status: Former     Current packs/day: 0.00     Types: Cigarettes     Quit date: 1/1/2014     Years since quitting: 10.6     Passive exposure: Never    Smokeless tobacco: Never   Vaping Use    Vaping status: Never Used   Substance and Sexual Activity    Alcohol use: No    Drug use: No     Social

## 2024-08-29 NOTE — PROGRESS NOTES
ARRIVAL INFORMATION:  Verified patient name and date of birth, scheduled procedure, and informed consent.     : Madeline contact number: [phone]  Physician and staff can share information with the .     Belongings with patient include:  Clothing,None    GI FOCUSED ASSESSMENT:  Neuro: Awake, alert, oriented x4  Respiratory: even and unlabored   GI: soft and non-distended  EKG Rhythm: normal sinus rhythm    Education:Reviewed general discharge instructions and  information.

## 2024-08-29 NOTE — ANESTHESIA PRE PROCEDURE
Department of Anesthesiology  Preprocedure Note       Name:  Kyree Munguia   Age:  44 y.o.  :  1980                                          MRN:  [id number]         Date:  2024      Surgeon: Surgeon(s):  Tremaine Escobar MD    Procedure: Procedure(s):  ESOPHAGOGASTRODUODENOSCOPY  COLONOSCOPY    Medications prior to admission:   Prior to Admission medications    Medication Sig Start Date End Date Taking? Authorizing Provider   Dulaglutide (TRULICITY) 4.5 MG/0.5ML SOPN Inject 4.5 mg into the skin once a week E11.65. Pt's insurance has approved the Trulicity  Patient taking differently: Inject 4.5 mg into the skin once a week E11.65. Pt's insurance has approved the Trulicity. Patient has not taken since 24   Nicholas Vail MD   betamethasone valerate (VALISONE) 0.1 % ointment APPLY  OINTMENT TO AFFECTED AREA TWICE DAILY 24   Kike Harrison MD   furosemide (LASIX) 40 MG tablet Take 1 tablet by mouth 3 times daily 24  Kike Harrison MD   levothyroxine (SYNTHROID) 175 MCG tablet Take one pill every morning. Take first thing in the morning, by itself with water only. Wait 30 minutes before you eat or take any other pills. 24   Nicholas Vail MD   Insulin Glargine, 2 Unit Dial, (TOUJEO MAX SOLOSTAR) 300 UNIT/ML SOPN Take 90 units every morning and 90 units at bedtime.  Patient taking differently: Take 90 units every morning and 90 units at bedtime. 5/15/24   Nicholas Vail MD   insulin regular (HUMULIN R) 100 UNIT/ML injection 42 units with breakfast, 42 units with lunch and 42 units with dinner, plus correction, max daily dose 200 units 5/15/24   Nicholas Vail MD   sertraline (ZOLOFT) 100 MG tablet Take 1 tablet by mouth daily 24   Elizabeth Lezama APRN - CNP   sertraline (ZOLOFT) 50 MG tablet Take 1 tablet by mouth daily 24   Elizabeth Lezama APRN - CNP   busPIRone (BUSPAR) 15 MG tablet Take 15 mg by mouth in the morning and at bedtime 24  Teja           Endo/Other:    (+) DiabetesType I DM, using insulin, hypothyroidism::..          Pt had PAT visit.       Abdominal:              PE comment: Deferred   Vascular: negative vascular ROS.         Other Findings:       Anesthesia Plan      MAC     ASA 3       Induction: intravenous.      Anesthetic plan and risks discussed with patient.      Plan discussed with CRNA.    Attending anesthesiologist reviewed and agrees with Preprocedure content            Kori Marshall MD   8/29/2024

## 2024-08-29 NOTE — DISCHARGE INSTRUCTIONS
Kyree Munguia  [id number]  1980    EGD/COLON DISCHARGE INSTRUCTIONS  Discomfort:  Redness at IV site- apply warm compress to area; if redness or soreness persist- contact your physician  There may be a slight amount of blood passed from the rectum  Gaseous discomfort- walking, belching will help relieve any discomfort  You may not operate a vehicle for 12 hours  You may not engage in an occupation involving machinery or appliances for rest of today  You may not drink alcoholic beverages for at least 12 hours  Avoid making any critical decisions for at least 24 hour  DIET:   High fiber diet.   - however -  remember your colon is empty and a heavy meal will produce gas.   Avoid these foods:  vegetables, fried / greasy foods, carbonated drinks for today  MEDICATION:  [unfilled]     ACTIVITY:  You may not resume your normal daily activities until tomorrow AM; it is recommended that you spend the remainder of the day resting -  avoid any strenuous activity.  CALL M.D.  ANY SIGN OF:   Increasing pain, nausea, vomiting  Abdominal distension (swelling)  New increased bleeding (oral or rectal)  Fever (chills)  Pain in chest area  Bloody discharge from nose or mouth  Shortness of breath    IMPRESSION:  -Normal esophageal mucosa; biopsied to exclude active inflammation  -Normal stomach mucosa; biopsied to exclude inflammation  -Normal duodenal mucosa  -Single 4mm sessile polyp in the sigmoid colon at 35cm; removed with cold snare  -Single 3mm sessile polyp in the descending colon at 60cm; removed with cold snare  -Single 2mm sessile polyp in the transverse colon at 75cm; removed with cold snare    Follow-up Instructions:   Call Dr. Escobar for the results of procedure / biopsy in 7-10 days  Telephone # [phone]  Repeat colonoscopy in 5 years  Resume Plavix in 2 days    Tremaine Escobar MD  Patient Education on Sedation / Analgesia Administered for Procedure      For 24 hours after general anesthesia or intravenous

## 2024-09-19 ENCOUNTER — OFFICE VISIT (OUTPATIENT)
Age: 44
End: 2024-09-19

## 2024-09-19 VITALS
BODY MASS INDEX: 41.89 KG/M2 | HEIGHT: 69 IN | WEIGHT: 282.8 LBS | DIASTOLIC BLOOD PRESSURE: 68 MMHG | HEART RATE: 74 BPM | SYSTOLIC BLOOD PRESSURE: 108 MMHG

## 2024-09-19 DIAGNOSIS — E78.2 MIXED HYPERLIPIDEMIA: ICD-10-CM

## 2024-09-19 DIAGNOSIS — E03.9 ACQUIRED HYPOTHYROIDISM: ICD-10-CM

## 2024-09-19 DIAGNOSIS — E29.1 TESTICULAR HYPOFUNCTION: ICD-10-CM

## 2024-09-19 DIAGNOSIS — Z79.4 TYPE 2 DIABETES MELLITUS WITH DIABETIC POLYNEUROPATHY, WITH LONG-TERM CURRENT USE OF INSULIN (HCC): Primary | ICD-10-CM

## 2024-09-19 DIAGNOSIS — E11.42 TYPE 2 DIABETES MELLITUS WITH DIABETIC POLYNEUROPATHY, WITH LONG-TERM CURRENT USE OF INSULIN (HCC): Primary | ICD-10-CM

## 2024-09-19 LAB — HBA1C MFR BLD: 10.1 %

## 2024-09-19 RX ORDER — SPIRONOLACTONE 50 MG/1
50 TABLET, FILM COATED ORAL DAILY
COMMUNITY
Start: 2024-08-06

## 2024-09-19 RX ORDER — LIDOCAINE 50 MG/G
1 PATCH TOPICAL DAILY
COMMUNITY
Start: 2024-08-06

## 2024-09-20 LAB
ALBUMIN SERPL-MCNC: 4.5 G/DL (ref 4.1–5.1)
ALBUMIN/CREAT UR: 127 MG/G CREAT (ref 0–29)
ALP SERPL-CCNC: 159 IU/L (ref 44–121)
ALT SERPL-CCNC: 27 IU/L (ref 0–44)
AST SERPL-CCNC: 21 IU/L (ref 0–40)
BILIRUB SERPL-MCNC: 0.5 MG/DL (ref 0–1.2)
BUN SERPL-MCNC: 20 MG/DL (ref 6–24)
BUN/CREAT SERPL: 14 (ref 9–20)
CALCIUM SERPL-MCNC: 10.3 MG/DL (ref 8.7–10.2)
CHLORIDE SERPL-SCNC: 95 MMOL/L (ref 96–106)
CHOLEST SERPL-MCNC: 147 MG/DL (ref 100–199)
CO2 SERPL-SCNC: 22 MMOL/L (ref 20–29)
CREAT SERPL-MCNC: 1.4 MG/DL (ref 0.76–1.27)
CREAT UR-MCNC: 102.2 MG/DL
EGFRCR SERPLBLD CKD-EPI 2021: 63 ML/MIN/1.73
GLOBULIN SER CALC-MCNC: 2.5 G/DL (ref 1.5–4.5)
GLUCOSE SERPL-MCNC: 262 MG/DL (ref 70–99)
HDLC SERPL-MCNC: 30 MG/DL
IMP & REVIEW OF LAB RESULTS: NORMAL
LDLC SERPL CALC-MCNC: 49 MG/DL (ref 0–99)
LDLC SERPL DIRECT ASSAY-MCNC: 59 MG/DL (ref 0–99)
Lab: ABNORMAL
Lab: NORMAL
MICROALBUMIN UR-MCNC: 129.7 UG/ML
POTASSIUM SERPL-SCNC: 4.7 MMOL/L (ref 3.5–5.2)
PROT SERPL-MCNC: 7 G/DL (ref 6–8.5)
SODIUM SERPL-SCNC: 135 MMOL/L (ref 134–144)
T4 FREE SERPL-MCNC: 1.08 NG/DL (ref 0.82–1.77)
TRIGL SERPL-MCNC: 456 MG/DL (ref 0–149)
TSH SERPL DL<=0.005 MIU/L-ACNC: 3.51 UIU/ML (ref 0.45–4.5)
VLDLC SERPL CALC-MCNC: 68 MG/DL (ref 5–40)

## 2024-10-04 ENCOUNTER — CLINICAL DOCUMENTATION (OUTPATIENT)
Age: 44
End: 2024-10-04

## 2024-11-20 ENCOUNTER — OFFICE VISIT (OUTPATIENT)
Age: 44
End: 2024-11-20
Payer: MEDICARE

## 2024-11-20 VITALS
HEIGHT: 69 IN | OXYGEN SATURATION: 96 % | BODY MASS INDEX: 41.95 KG/M2 | RESPIRATION RATE: 16 BRPM | TEMPERATURE: 97.5 F | HEART RATE: 71 BPM | WEIGHT: 283.2 LBS | DIASTOLIC BLOOD PRESSURE: 67 MMHG | SYSTOLIC BLOOD PRESSURE: 127 MMHG

## 2024-11-20 DIAGNOSIS — N18.31 CHRONIC KIDNEY DISEASE, STAGE 3A (HCC): ICD-10-CM

## 2024-11-20 DIAGNOSIS — Z79.4 TYPE 2 DIABETES MELLITUS WITH DIABETIC POLYNEUROPATHY, WITH LONG-TERM CURRENT USE OF INSULIN (HCC): ICD-10-CM

## 2024-11-20 DIAGNOSIS — M54.50 LOW BACK PAIN, UNSPECIFIED BACK PAIN LATERALITY, UNSPECIFIED CHRONICITY, UNSPECIFIED WHETHER SCIATICA PRESENT: ICD-10-CM

## 2024-11-20 DIAGNOSIS — E03.9 ACQUIRED HYPOTHYROIDISM: Primary | ICD-10-CM

## 2024-11-20 DIAGNOSIS — I10 ESSENTIAL HYPERTENSION: ICD-10-CM

## 2024-11-20 DIAGNOSIS — E11.42 TYPE 2 DIABETES MELLITUS WITH DIABETIC POLYNEUROPATHY, WITH LONG-TERM CURRENT USE OF INSULIN (HCC): ICD-10-CM

## 2024-11-20 DIAGNOSIS — I25.110 CORONARY ARTERY DISEASE INVOLVING NATIVE CORONARY ARTERY OF NATIVE HEART WITH UNSTABLE ANGINA PECTORIS (HCC): ICD-10-CM

## 2024-11-20 PROBLEM — G45.1 TRANSIENT ISCHEMIC ATTACK INVOLVING LEFT INTERNAL CAROTID ARTERY: Status: RESOLVED | Noted: 2019-07-15 | Resolved: 2024-11-20

## 2024-11-20 PROCEDURE — 3074F SYST BP LT 130 MM HG: CPT | Performed by: INTERNAL MEDICINE

## 2024-11-20 PROCEDURE — 99214 OFFICE O/P EST MOD 30 MIN: CPT | Performed by: INTERNAL MEDICINE

## 2024-11-20 PROCEDURE — 3078F DIAST BP <80 MM HG: CPT | Performed by: INTERNAL MEDICINE

## 2024-11-20 RX ORDER — ROPINIROLE 0.5 MG/1
0.5 TABLET, FILM COATED ORAL 3 TIMES DAILY
Qty: 90 TABLET | Refills: 3 | Status: SHIPPED | OUTPATIENT
Start: 2024-11-20

## 2024-11-20 RX ORDER — METHOCARBAMOL 750 MG/1
750 TABLET, FILM COATED ORAL 4 TIMES DAILY PRN
Qty: 40 TABLET | Refills: 1 | Status: SHIPPED | OUTPATIENT
Start: 2024-11-20

## 2024-11-20 NOTE — PROGRESS NOTES
PROGRESS NOTE  Name: Kyree Munguia   : 1980       ASSESSMENT/ PLAN:       Hypertension: BP is fine.   Type 2 diabetes with nephropathy (HCC): Follows with Dr. Vail--defer labs to him. He is seeing ophthalmologist for retinopathy.   Coronary artery disease involving native coronary artery of native heart with unstable angina pectoris (HCC): s/p CABG x1. As per Dr. Gibson, cardiology.   CKD stage 3: Avoid nephrotoxic agents.   Hypertriglyceridemia:  Abnormal LFT: US in May 2024 shows steatosis, hepatomegaly, and splenomegaly. Work on weight.   Leg swelling is ongoing, better lately. Likely venous insufficiency. Negative dulpex in 2024. He has had negative echo in . Recent labs don't indicate kidney failure or loss of synthetic liver function. Compression and elevation as previously advised. He is seeing cardiologist also.   Left sciatica: Following with neurology. I refilled his robaxin.  Aortic valve stenosis, etiology of cardiac valve disease unspecified: S/P AVR (aortic valve replacement) and aortoplasty [Z95.2 (ICD-10-CM)] in . Follow up as per [facility] surgery and Cardiology--Dr. Gibson.   Morbid obesity: He was unable to tolerate GLP 1 agent from endocrinology. Work on diet, exercise--I emphasized portion size control. Consider bariatric procdure down the road.   Adjustment disorder with mixed emotional features / Anxiety disorder due to general medical condition with panic attack: Continue follow up with Psychiatry.   Insomnia disorder with non-sleep disorder mental comorbidity: Continue follow up with psychiatrist.   Multiple lacunar infarcts (HCC): Per neurology.   Dizzy spells: Seeing neurology.   Cervical spondylosis with radiculopathy: Ongoing, not relieved with ANNY. Sees Dr. Burroughs, pain management.   GERD: Improved; seeing GI at [facility].     RTC 6 months HTN, DM, etc    I have reviewed the patient's medications and risks/side effects/benefits were discussed. Diagnosis(-es)

## 2024-11-21 DIAGNOSIS — E11.42 TYPE 2 DIABETES MELLITUS WITH DIABETIC POLYNEUROPATHY, WITH LONG-TERM CURRENT USE OF INSULIN (HCC): ICD-10-CM

## 2024-11-21 DIAGNOSIS — Z79.4 TYPE 2 DIABETES MELLITUS WITH DIABETIC POLYNEUROPATHY, WITH LONG-TERM CURRENT USE OF INSULIN (HCC): ICD-10-CM

## 2024-11-21 DIAGNOSIS — I10 ESSENTIAL HYPERTENSION: ICD-10-CM

## 2024-11-22 DIAGNOSIS — E03.9 ACQUIRED HYPOTHYROIDISM: ICD-10-CM

## 2024-11-22 RX ORDER — LEVOTHYROXINE SODIUM 175 UG/1
TABLET ORAL
Qty: 90 TABLET | Refills: 1 | Status: SHIPPED | OUTPATIENT
Start: 2024-11-22

## 2024-11-22 RX ORDER — FUROSEMIDE 40 MG/1
40 TABLET ORAL 2 TIMES DAILY
Qty: 180 TABLET | Refills: 3 | Status: SHIPPED | OUTPATIENT
Start: 2024-11-22

## 2024-12-17 DIAGNOSIS — F43.29 ADJUSTMENT DISORDER WITH OTHER SYMPTOMS: ICD-10-CM

## 2024-12-17 DIAGNOSIS — F41.1 GENERALIZED ANXIETY DISORDER: ICD-10-CM

## 2024-12-17 RX ORDER — SERTRALINE HYDROCHLORIDE 100 MG/1
100 TABLET, FILM COATED ORAL DAILY
Qty: 30 TABLET | Refills: 0 | Status: SHIPPED | OUTPATIENT
Start: 2024-12-17

## 2024-12-17 NOTE — TELEPHONE ENCOUNTER
Chief Complaint   Patient presents with    Medication Refill       Requested Prescriptions     Pending Prescriptions Disp Refills    sertraline (ZOLOFT) 50 MG tablet 30 tablet 5     Sig: Take 1 tablet by mouth daily         Last visit with clinic:  Visit date not found   Next visit with clinic: 1/21/2025 with Shirin

## 2025-01-04 RX ORDER — ESOMEPRAZOLE MAGNESIUM 40 MG/1
40 CAPSULE, DELAYED RELEASE ORAL 2 TIMES DAILY
Qty: 60 CAPSULE | Refills: 11 | Status: SHIPPED | OUTPATIENT
Start: 2025-01-04

## 2025-01-13 RX ORDER — INSULIN GLARGINE 300 U/ML
INJECTION, SOLUTION SUBCUTANEOUS
Qty: 90 ML | Refills: 1 | Status: SHIPPED | OUTPATIENT
Start: 2025-01-13 | End: 2025-01-14 | Stop reason: SDUPTHER

## 2025-01-14 RX ORDER — INSULIN GLARGINE 300 U/ML
INJECTION, SOLUTION SUBCUTANEOUS
Qty: 30 ML | Refills: 5 | Status: SHIPPED | OUTPATIENT
Start: 2025-01-14

## 2025-01-14 NOTE — TELEPHONE ENCOUNTER
Patient left VM stating needs new Rxs for increased doses of Toujeo as 120 units BID and Humulin R 70 units TID plus correction.

## 2025-01-17 ENCOUNTER — HOSPITAL ENCOUNTER (EMERGENCY)
Facility: HOSPITAL | Age: 45
Discharge: HOME | End: 2025-01-17
Payer: MEDICARE

## 2025-01-17 ENCOUNTER — TELEPHONE (OUTPATIENT)
Age: 45
End: 2025-01-17

## 2025-01-17 VITALS
OXYGEN SATURATION: 100 % | BODY MASS INDEX: 43.1 KG/M2 | SYSTOLIC BLOOD PRESSURE: 170 MMHG | HEIGHT: 69 IN | WEIGHT: 291.01 LBS | HEART RATE: 81 BPM | DIASTOLIC BLOOD PRESSURE: 75 MMHG | RESPIRATION RATE: 16 BRPM | TEMPERATURE: 97.8 F

## 2025-01-17 DIAGNOSIS — B37.42 CANDIDAL BALANITIS: Primary | ICD-10-CM

## 2025-01-17 DIAGNOSIS — B00.9 HSV-2 (HERPES SIMPLEX VIRUS 2) INFECTION: ICD-10-CM

## 2025-01-17 LAB
APPEARANCE UR: CLEAR
BACTERIA URNS QL MICRO: NEGATIVE /HPF
BILIRUB UR QL: NEGATIVE
COLOR UR: ABNORMAL
EPITH CASTS URNS QL MICRO: ABNORMAL /LPF
GLUCOSE UR STRIP.AUTO-MCNC: >1000 MG/DL
HGB UR QL STRIP: NEGATIVE
HYALINE CASTS URNS QL MICRO: ABNORMAL /LPF (ref 0–2)
KETONES UR QL STRIP.AUTO: NEGATIVE MG/DL
LEUKOCYTE ESTERASE UR QL STRIP.AUTO: NEGATIVE
NITRITE UR QL STRIP.AUTO: NEGATIVE
PH UR STRIP: 6 (ref 5–8)
PROT UR STRIP-MCNC: NEGATIVE MG/DL
RBC #/AREA URNS HPF: ABNORMAL /HPF (ref 0–5)
SP GR UR REFRACTOMETRY: 1.02
URINE CULTURE IF INDICATED: ABNORMAL
UROBILINOGEN UR QL STRIP.AUTO: 0.2 EU/DL (ref 0.2–1)
WBC URNS QL MICRO: ABNORMAL /HPF (ref 0–4)

## 2025-01-17 PROCEDURE — 87491 CHLMYD TRACH DNA AMP PROBE: CPT

## 2025-01-17 PROCEDURE — 6370000000 HC RX 637 (ALT 250 FOR IP)

## 2025-01-17 PROCEDURE — 81001 URINALYSIS AUTO W/SCOPE: CPT

## 2025-01-17 PROCEDURE — 99283 EMERGENCY DEPT VISIT LOW MDM: CPT

## 2025-01-17 PROCEDURE — 87591 N.GONORRHOEAE DNA AMP PROB: CPT

## 2025-01-17 RX ORDER — VALACYCLOVIR HYDROCHLORIDE 1 G/1
1000 TABLET, FILM COATED ORAL 3 TIMES DAILY
Qty: 21 TABLET | Refills: 0 | Status: SHIPPED | OUTPATIENT
Start: 2025-01-17 | End: 2025-01-24

## 2025-01-17 RX ORDER — CLOTRIMAZOLE 1 %
CREAM (GRAM) TOPICAL
Qty: 15 G | Refills: 1 | Status: SHIPPED | OUTPATIENT
Start: 2025-01-17 | End: 2025-01-24

## 2025-01-17 RX ORDER — FLUCONAZOLE 100 MG/1
200 TABLET ORAL
Status: COMPLETED | OUTPATIENT
Start: 2025-01-17 | End: 2025-01-17

## 2025-01-17 RX ORDER — FLUCONAZOLE 150 MG/1
150 TABLET ORAL ONCE
Qty: 1 TABLET | Refills: 0 | Status: SHIPPED | OUTPATIENT
Start: 2025-01-17 | End: 2025-01-17

## 2025-01-17 RX ADMIN — FLUCONAZOLE 200 MG: 100 TABLET ORAL at 22:58

## 2025-01-17 ASSESSMENT — LIFESTYLE VARIABLES
HOW OFTEN DO YOU HAVE A DRINK CONTAINING ALCOHOL: NEVER
HOW MANY STANDARD DRINKS CONTAINING ALCOHOL DO YOU HAVE ON A TYPICAL DAY: PATIENT DOES NOT DRINK

## 2025-01-17 ASSESSMENT — PAIN DESCRIPTION - LOCATION: LOCATION: PENIS

## 2025-01-17 ASSESSMENT — PAIN SCALES - GENERAL: PAINLEVEL_OUTOF10: 1

## 2025-01-17 NOTE — TELEPHONE ENCOUNTER
Mom, Madeline states that pt has a yeast infection in groin area.  Pt is asking for medication to be called in for this.    Please call into [facility] #[phone]    Please call Madeline to let her know what you can do. Thanks.

## 2025-01-18 NOTE — ED PROVIDER NOTES
patient's chart it looks like he was given Valtrex.  Will give patient a dose of Diflucan and Valtrex and encouraged him to continue with the Lotrimin.  Patient understanding and will follow-up with urology.             FINAL IMPRESSION     1. Candidal balanitis    2. HSV-2 (herpes simplex virus 2) infection          DISPOSITION/PLAN   DISPOSITION Decision To Discharge 01/17/2025 11:02:16 PM   DISPOSITION CONDITION Stable             Care plan outlined and precautions discussed.  Patient has no new complaints, changes, or physical findings.  Results of physical exam were reviewed with the patient. All medications were reviewed with the patient; will d/c home with Valtrex and fluconazole. All of pt's questions and concerns were addressed. Patient was instructed and agrees to follow up with urology or PCP, as well as to return to the ED upon further deterioration. Patient is ready to go home.      PATIENT REFERRED TO:  [facility]  [address] [address]  [phone]           DISCHARGE MEDICATIONS:     Medication List        START taking these medications      fluconazole 150 MG tablet  Commonly known as: DIFLUCAN  Take 1 tablet by mouth once for 1 dose     valACYclovir 1 g tablet  Commonly known as: VALTREX  Take 1 tablet by mouth 3 times daily for 7 days            CONTINUE taking these medications      Dexcom G7  Yareli  Use with DexCom G7 sensors. E11.649     Dexcom G7 Sensor Misc  Change sensor every 10 days. E11.649            ASK your doctor about these medications      aspirin 81 MG EC tablet     betamethasone valerate 0.1 % ointment  Commonly known as: VALISONE  APPLY  OINTMENT TO AFFECTED AREA TWICE DAILY     butalbital-acetaminophen-caffeine -40 MG per tablet  Commonly known as: FIORICET, ESGIC     clopidogrel 75 MG tablet  Commonly known as: PLAVIX     clotrimazole 1 % cream  Commonly known as: Lotrimin AF  Apply topically 2 times daily.     esomeprazole

## 2025-01-20 LAB
C TRACH DNA SPEC QL NAA+PROBE: NEGATIVE
N GONORRHOEA DNA SPEC QL NAA+PROBE: NEGATIVE
SAMPLE TYPE: NORMAL
SERVICE CMNT-IMP: NORMAL
SPECIMEN SOURCE: NORMAL

## 2025-01-21 ENCOUNTER — OFFICE VISIT (OUTPATIENT)
Age: 45
End: 2025-01-21
Payer: MEDICARE

## 2025-01-21 VITALS
HEART RATE: 75 BPM | HEIGHT: 69 IN | RESPIRATION RATE: 16 BRPM | DIASTOLIC BLOOD PRESSURE: 68 MMHG | OXYGEN SATURATION: 95 % | SYSTOLIC BLOOD PRESSURE: 124 MMHG | BODY MASS INDEX: 43.84 KG/M2 | WEIGHT: 296 LBS

## 2025-01-21 DIAGNOSIS — M54.2 NECK PAIN: ICD-10-CM

## 2025-01-21 DIAGNOSIS — G44.86 CERVICOGENIC HEADACHE: ICD-10-CM

## 2025-01-21 DIAGNOSIS — E66.01 CLASS 3 SEVERE OBESITY DUE TO EXCESS CALORIES WITH SERIOUS COMORBIDITY AND BODY MASS INDEX (BMI) OF 40.0 TO 44.9 IN ADULT: Primary | ICD-10-CM

## 2025-01-21 DIAGNOSIS — I63.81 MULTIPLE LACUNAR INFARCTS (HCC): ICD-10-CM

## 2025-01-21 DIAGNOSIS — M54.42 CHRONIC BILATERAL LOW BACK PAIN WITH BILATERAL SCIATICA: ICD-10-CM

## 2025-01-21 DIAGNOSIS — M54.81 OCCIPITAL NEURALGIA OF RIGHT SIDE: ICD-10-CM

## 2025-01-21 DIAGNOSIS — E66.813 CLASS 3 SEVERE OBESITY DUE TO EXCESS CALORIES WITH SERIOUS COMORBIDITY AND BODY MASS INDEX (BMI) OF 40.0 TO 44.9 IN ADULT: Primary | ICD-10-CM

## 2025-01-21 DIAGNOSIS — M54.41 CHRONIC BILATERAL LOW BACK PAIN WITH BILATERAL SCIATICA: ICD-10-CM

## 2025-01-21 DIAGNOSIS — G89.29 CHRONIC BILATERAL LOW BACK PAIN WITH BILATERAL SCIATICA: ICD-10-CM

## 2025-01-21 DIAGNOSIS — G43.009 MIGRAINE WITHOUT AURA AND WITHOUT STATUS MIGRAINOSUS, NOT INTRACTABLE: ICD-10-CM

## 2025-01-21 PROCEDURE — 3078F DIAST BP <80 MM HG: CPT | Performed by: PSYCHIATRY & NEUROLOGY

## 2025-01-21 PROCEDURE — 3074F SYST BP LT 130 MM HG: CPT | Performed by: PSYCHIATRY & NEUROLOGY

## 2025-01-21 PROCEDURE — 99215 OFFICE O/P EST HI 40 MIN: CPT | Performed by: PSYCHIATRY & NEUROLOGY

## 2025-01-21 RX ORDER — BUSPIRONE HYDROCHLORIDE 15 MG/1
15 TABLET ORAL 2 TIMES DAILY
COMMUNITY
Start: 2024-12-17

## 2025-01-21 ASSESSMENT — PATIENT HEALTH QUESTIONNAIRE - PHQ9
SUM OF ALL RESPONSES TO PHQ9 QUESTIONS 1 & 2: 0
SUM OF ALL RESPONSES TO PHQ QUESTIONS 1-9: 0
2. FEELING DOWN, DEPRESSED OR HOPELESS: NOT AT ALL
SUM OF ALL RESPONSES TO PHQ QUESTIONS 1-9: 0
1. LITTLE INTEREST OR PLEASURE IN DOING THINGS: NOT AT ALL
SUM OF ALL RESPONSES TO PHQ QUESTIONS 1-9: 0
SUM OF ALL RESPONSES TO PHQ QUESTIONS 1-9: 0

## 2025-01-21 NOTE — ASSESSMENT & PLAN NOTE
Patient reports having gained 15 pounds.  He states he is not eating much.  Unclear what that means.  He does not monitor his weight at home.     He has an appointment coming up with his cardiologist later today so hopefully that will get evaluated to make sure that water retention and cardiac function is not contributory.     He will be referred to the bariatric center for further evaluation and nutritional counseling and weight management.     He has been advised to monitor his weight daily for routine feedback regarding activities eating and other health issues at this time.     He is aware how his weight affects his overall health to include cardiac diabetic and back pain

## 2025-01-21 NOTE — PROGRESS NOTES
1. Have you been to the ER, urgent care clinic since your last visit?  Hospitalized since your last visit?  No.    2. Have you seen or consulted any other health care providers outside of the [facility] since your last visit?  Include any pap smears or colon screening.   No.      Chief Complaint   Patient presents with    Migraine     6 month follow up- pt denies any new symptoms       
cervical masses or bruits appreciated  No tenderness in the head or neck area to palpation    No rashes unusual bruising or bleeding lacerations are atypical discoloration appreciated on general inspection      Neurological Examination:   Mental Status:        Formal testing was not completed    there was nothing concerning on general observation and discussion.   Alert oriented and appropriate to general conversation  Normal processing on general observation  Followed conversation and responded seemingly appropriate throughout the office visit  No word finding difficulties noted on casual observation  Able to follow directions without difficulty     Cranial Nerves:    Grossly intact    Motor:   Normal bulk  No tremor appreciated on today's exam  Moves guardedly with the left leg presumably due to pain positive pain to leg raise on the left  5/5 x 4      Sensation: Intact to light touch    Coordination/Cerebellar:   Grossly intact    Gait: Ambulates independently but guardedly presumably due to pain    Reflexes: Diminished but symmetrical    Fall risk assessment       No data to display                   The patient (or guardian, if applicable) and other individuals in attendance with the patient were advised that Artificial Intelligence will be utilized during this visit to record, process the conversation to generate a clinical note, and support improvement of the AI technology. The patient (or guardian, if applicable) and other individuals in attendance at the appointment consented to the use of AI, including the recording.            Precious Plaza, MS, ANP-BC, MSCN

## 2025-01-21 NOTE — ASSESSMENT & PLAN NOTE
No tenderness to palpation at today's office visit but certainly contributes to his headache and migraine syndrome.  Presently noncontributory

## 2025-01-21 NOTE — PATIENT INSTRUCTIONS
As a reminder:   Please come to your appointment 15 minutes before your office appointment.  This way, you can get checked in at the  and checked in by the nursing staff so you have the full allotment of time with your provider for your visit.  Please bring an up-to-date and accurate list of all your medications.  Or bring all your active prescription bottles with you at the time of your office visit and this includes over-the-counter medications so we can make sure that your medication list is up-to-date.  If you are scheduled for a virtual visit, please be aware that the  will need to check you in and usually the day before to verify insurance and collect co-pays as appropriate.  Please be prepared for the second call which will be from the nurse to go over your medications and any other vital information.  This will probably be done 30 minutes prior to your visit.  The reason why we do this early is that you can get the full benefit of your appointment time with your provider.  Finally you will be given the link for your virtual visit please click into your link 10 minutes prior to your appointment and please wait patiently for the provider to join you        As per discussion  Patient Information:  Name: Kyree Munguia  Age: 44  Medical History: Migraines, cervicogenic headaches, left-sided sciatica, multiple lacunar infarcts, occipital neuralgia, diabetes, back pain, and history of heart surgery.    Reason for Visit:  Kyree Munguia visited for a follow-up regarding his migraines, headaches, weight gain, leg swelling, gastrointestinal issues, and back pain. He also discussed his diabetes management and recent weight gain.    Clinical Findings:  - Migraines and headaches occur 1 to 2 times per week, sometimes triggered by head movements and will get flashing lights and then migraine will start.  - Weight gain of 15 pounds, with noticeable swelling in the legs and abdominal distension when

## 2025-01-21 NOTE — ASSESSMENT & PLAN NOTE
Reports that his back pain does limit his ability to exercise and move around.  He has been followed by [facility].  He was in physical therapy and states he had to stop due to insurance difficulties.  I have asked him to recontact [facility] who follows him and let them know that he needs a new prescription for physical therapy and perhaps through [facility] since we know that his insurance excepts [facility] as a healthcare system.

## 2025-01-21 NOTE — ASSESSMENT & PLAN NOTE
Will check cervical spine x-rays.  When he tends to turn his head to the right he can trigger his migraine.  Concerning for instability or possible vascular constriction

## 2025-01-21 NOTE — ASSESSMENT & PLAN NOTE
Reasonably stable for patient he is managing this with rescue medication of Fioricet only does not use it more than 2 times per week generally only needs it 1 time per week.     He is to continue use of the Fioricet for migraine management at this time he is on polypharmacy for multiple other medical conditions I do not wish to add any further medicine if we can avoid it.  He is in agreement

## 2025-01-21 NOTE — ASSESSMENT & PLAN NOTE
MRI scan from 2022 does show some arthritic changes and some stenosis in the cervical region.  This is probably contributing to his headaches.  In addition there is a component of occipital neuralgia.    We will check neck x-rays for stability at this time

## 2025-01-23 ENCOUNTER — OFFICE VISIT (OUTPATIENT)
Age: 45
End: 2025-01-23

## 2025-01-23 VITALS
HEART RATE: 75 BPM | SYSTOLIC BLOOD PRESSURE: 146 MMHG | BODY MASS INDEX: 43.99 KG/M2 | HEIGHT: 69 IN | WEIGHT: 297 LBS | DIASTOLIC BLOOD PRESSURE: 55 MMHG

## 2025-01-23 DIAGNOSIS — Z79.4 TYPE 2 DIABETES MELLITUS WITH DIABETIC POLYNEUROPATHY, WITH LONG-TERM CURRENT USE OF INSULIN (HCC): Primary | ICD-10-CM

## 2025-01-23 DIAGNOSIS — E03.9 ACQUIRED HYPOTHYROIDISM: ICD-10-CM

## 2025-01-23 DIAGNOSIS — E29.1 TESTICULAR HYPOFUNCTION: ICD-10-CM

## 2025-01-23 DIAGNOSIS — E11.42 TYPE 2 DIABETES MELLITUS WITH DIABETIC POLYNEUROPATHY, WITH LONG-TERM CURRENT USE OF INSULIN (HCC): Primary | ICD-10-CM

## 2025-01-23 DIAGNOSIS — E78.2 MIXED HYPERLIPIDEMIA: ICD-10-CM

## 2025-01-23 LAB — HBA1C MFR BLD: 10.8 %

## 2025-01-23 NOTE — PROGRESS NOTES
10/22/19 and repeat was 240 in 10/28/19. His FSH, LH and prolactin were unremarkable. Pt opted to  Not start Clomid 50mg every other  day. At our last visit we again ordered the clomid. Pt notes that he has been taking the Clomid 50mg every other day.   He notes he is still having issues of ED and poor libido so he stopped taking the Clomid because of the cost.     He is not currently taking the testosterone replacement.      Pt notes he was changed from CPAP to BiPap, but he had a panic attack and the BiPap made it worse \"so I have not used it since.\" \"I am trying to get healed from the surgery and I will see the sleep doctor again.\"  \"I am not using either one at this time.\"    Current Outpatient Medications   Medication Sig    busPIRone (BUSPAR) 15 MG tablet Take 15 mg by mouth 2 times daily    valACYclovir (VALTREX) 1 g tablet Take 1 tablet by mouth 3 times daily for 7 days    insulin regular (HUMULIN R) 100 UNIT/ML injection 70 units with breakfast, 70 units with lunch and 70 units with dinner, plus correction, max daily dose 300 units    Insulin Glargine, 2 Unit Dial, (TOUJEO MAX SOLOSTAR) 300 UNIT/ML concentrated injection pen Take 120 units every morning and 120 units at bedtime.    esomeprazole (NEXIUM) 40 MG delayed release capsule Take 1 capsule by mouth in the morning and at bedtime    sertraline (ZOLOFT) 50 MG tablet Take 1 tablet by mouth daily    sertraline (ZOLOFT) 100 MG tablet Take 1 tablet by mouth daily    furosemide (LASIX) 40 MG tablet Take 1 tablet by mouth 2 times daily    levothyroxine (SYNTHROID) 175 MCG tablet Take one pill every morning. Take first thing in the morning, by itself with water only. Wait 30 minutes before you eat or take any other pills.    rOPINIRole (REQUIP) 0.5 MG tablet Take 1 tablet by mouth 3 times daily    methocarbamol (ROBAXIN-750) 750 MG tablet Take 1 tablet by mouth 4 times daily as needed (muscle pain)    lidocaine (LIDODERM) 5 % Apply 1 patch topically daily

## 2025-01-24 ENCOUNTER — TELEPHONE (OUTPATIENT)
Age: 45
End: 2025-01-24

## 2025-01-24 NOTE — TELEPHONE ENCOUNTER
Called patient regarding referral to our [facility]. Patient did not answer so I left a vmail with our contact information.

## 2025-01-25 DIAGNOSIS — F43.29 ADJUSTMENT DISORDER WITH OTHER SYMPTOMS: ICD-10-CM

## 2025-01-25 DIAGNOSIS — F41.1 GENERALIZED ANXIETY DISORDER: ICD-10-CM

## 2025-01-30 ENCOUNTER — OFFICE VISIT (OUTPATIENT)
Age: 45
End: 2025-01-30
Payer: MEDICARE

## 2025-01-30 VITALS
WEIGHT: 288 LBS | OXYGEN SATURATION: 97 % | HEIGHT: 69 IN | SYSTOLIC BLOOD PRESSURE: 112 MMHG | RESPIRATION RATE: 20 BRPM | DIASTOLIC BLOOD PRESSURE: 54 MMHG | BODY MASS INDEX: 42.65 KG/M2 | TEMPERATURE: 98 F | HEART RATE: 74 BPM

## 2025-01-30 DIAGNOSIS — F41.1 GENERALIZED ANXIETY DISORDER: ICD-10-CM

## 2025-01-30 DIAGNOSIS — F43.29 ADJUSTMENT DISORDER WITH OTHER SYMPTOMS: ICD-10-CM

## 2025-01-30 PROCEDURE — 90792 PSYCH DIAG EVAL W/MED SRVCS: CPT | Performed by: NURSE PRACTITIONER

## 2025-01-30 RX ORDER — BUSPIRONE HYDROCHLORIDE 15 MG/1
15 TABLET ORAL 2 TIMES DAILY
Qty: 60 TABLET | Refills: 2 | Status: SHIPPED | OUTPATIENT
Start: 2025-01-30

## 2025-01-30 RX ORDER — SERTRALINE HYDROCHLORIDE 100 MG/1
100 TABLET, FILM COATED ORAL DAILY
Qty: 30 TABLET | Refills: 2 | Status: SHIPPED | OUTPATIENT
Start: 2025-01-30

## 2025-01-30 ASSESSMENT — PATIENT HEALTH QUESTIONNAIRE - PHQ9
7. TROUBLE CONCENTRATING ON THINGS, SUCH AS READING THE NEWSPAPER OR WATCHING TELEVISION: NOT AT ALL
6. FEELING BAD ABOUT YOURSELF - OR THAT YOU ARE A FAILURE OR HAVE LET YOURSELF OR YOUR FAMILY DOWN: NOT AT ALL
8. MOVING OR SPEAKING SO SLOWLY THAT OTHER PEOPLE COULD HAVE NOTICED. OR THE OPPOSITE, BEING SO FIGETY OR RESTLESS THAT YOU HAVE BEEN MOVING AROUND A LOT MORE THAN USUAL: NOT AT ALL
10. IF YOU CHECKED OFF ANY PROBLEMS, HOW DIFFICULT HAVE THESE PROBLEMS MADE IT FOR YOU TO DO YOUR WORK, TAKE CARE OF THINGS AT HOME, OR GET ALONG WITH OTHER PEOPLE: NOT DIFFICULT AT ALL
SUM OF ALL RESPONSES TO PHQ9 QUESTIONS 1 & 2: 1
SUM OF ALL RESPONSES TO PHQ QUESTIONS 1-9: 4
SUM OF ALL RESPONSES TO PHQ QUESTIONS 1-9: 4
5. POOR APPETITE OR OVEREATING: SEVERAL DAYS
4. FEELING TIRED OR HAVING LITTLE ENERGY: SEVERAL DAYS
SUM OF ALL RESPONSES TO PHQ QUESTIONS 1-9: 4
SUM OF ALL RESPONSES TO PHQ QUESTIONS 1-9: 4
2. FEELING DOWN, DEPRESSED OR HOPELESS: NOT AT ALL
1. LITTLE INTEREST OR PLEASURE IN DOING THINGS: SEVERAL DAYS
3. TROUBLE FALLING OR STAYING ASLEEP: SEVERAL DAYS
9. THOUGHTS THAT YOU WOULD BE BETTER OFF DEAD, OR OF HURTING YOURSELF: NOT AT ALL

## 2025-01-30 ASSESSMENT — ANXIETY QUESTIONNAIRES
4. TROUBLE RELAXING: SEVERAL DAYS
5. BEING SO RESTLESS THAT IT IS HARD TO SIT STILL: NOT AT ALL
7. FEELING AFRAID AS IF SOMETHING AWFUL MIGHT HAPPEN: NOT AT ALL
6. BECOMING EASILY ANNOYED OR IRRITABLE: SEVERAL DAYS
GAD7 TOTAL SCORE: 5
1. FEELING NERVOUS, ANXIOUS, OR ON EDGE: SEVERAL DAYS
3. WORRYING TOO MUCH ABOUT DIFFERENT THINGS: SEVERAL DAYS
2. NOT BEING ABLE TO STOP OR CONTROL WORRYING: SEVERAL DAYS
IF YOU CHECKED OFF ANY PROBLEMS ON THIS QUESTIONNAIRE, HOW DIFFICULT HAVE THESE PROBLEMS MADE IT FOR YOU TO DO YOUR WORK, TAKE CARE OF THINGS AT HOME, OR GET ALONG WITH OTHER PEOPLE: NOT DIFFICULT AT ALL

## 2025-01-30 NOTE — PROGRESS NOTES
Chief Complaint   Patient presents with    Medication Check       History of Present Ilness:   Kyree Munguia is a 44 y.o. year old male with a reported history of depression and anxiety who presents today to establish care for medication management. Patient is former patient of  Elizabeth Lezama NP who has since left the practice. Patient reports that his symptoms have been present for years and currently are of moderate severity. Patient reports some stress related to \"current events and the economy.\" He is currently prescribed sertraline 150 mg daily, and buspirone 15 mg BID.  Patient reports compliance with these medications and report feeling that the medication has been helpful for her mood and partial effective for his anxiety.     Historically, his symptoms have included low mood, little interest or pleasure in doing things, difficulty getting to sleep, difficulty staying asleep, feeling tired or having little energy, feeling overwhelmed, poor concentration, restlessness, and irritability. These symptoms have cause emotional, social, and occupational impairments. Patient is not currently able to work due to his medical and mental health issues. He is on disability.     Patient is not currently in therapy. He denies use of alcohol, THC, or other substances. Patient denies symptoms of psychosis or navarro. He denies any thoughts of self harm, suicide, or homicide.     Past Psychiatric History:  Providers:Elizabeth Lezama NP  Therapist:None  Diagnosis: depression, anxiety  Medication: sertraline, lorazepam, buspirone  Hospitalization:Denies any hospitalizations  Denies any history of self-harm, suicide attempts, or violence. Admits to some anger issues.      Social History:  Born:[place]  Education:graduated HS, 4 years trade school  Developmental Delays: Denies any known developmental delays; speech and reading issues.  Relationships:\"pretty good\" relationship with mother.   Children: one child age 20   Occupation:on 
  Feeling bad about yourself - or that you are a failure or have let yourself or your family down 0  0   Trouble concentrating on things, such as reading the newspaper or watching television 0  0   Moving or speaking so slowly that other people could have noticed. Or the opposite - being so fidgety or restless that you have been moving around a lot more than usual 0  0   Thoughts that you would be better off dead, or of hurting yourself in some way 0  0   PHQ-2 Score 1 0 2   PHQ-9 Total Score 4 0 5   If you checked off any problems, how difficult have these problems made it for you to do your work, take care of things at home, or get along with other people? 0  0           1/30/2025    10:30 AM 6/26/2024    10:53 AM 4/29/2024     1:46 PM   SADE-7 SCREENING   Feeling nervous, anxious, or on edge Several days Several days Several days   Not being able to stop or control worrying Several days Several days Not at all   Worrying too much about different things Several days Several days Several days   Trouble relaxing Several days Several days Several days   Being so restless that it is hard to sit still Not at all Several days Several days   Becoming easily annoyed or irritable Several days Several days Several days   Feeling afraid as if something awful might happen Not at all Several days Several days   SADE-7 Total Score 5 7 6   How difficult have these problems made it for you to do your work, take care of things at home, or get along with other people? Not difficult at all Not difficult at all Somewhat difficult        \"Have you been to the ER, urgent care clinic since your last visit?  Hospitalized since your last visit?\"    NO    “Have you seen or consulted any other health care providers outside of [facility] since your last visit?”    NO

## 2025-02-14 ENCOUNTER — TELEPHONE (OUTPATIENT)
Age: 45
End: 2025-02-14

## 2025-02-14 RX ORDER — SIMETHICONE 80 MG/1
TABLET, CHEWABLE ORAL
Qty: 180 TABLET | Refills: 3 | Status: SHIPPED | OUTPATIENT
Start: 2025-02-14

## 2025-02-27 ENCOUNTER — TELEPHONE (OUTPATIENT)
Age: 45
End: 2025-02-27

## 2025-02-27 NOTE — TELEPHONE ENCOUNTER
Patient states cannot afford copay for Mounjaro 5 mg. Requests a sample of 2.5 mg. He states is \"fighting with insurance company\" to get help with his medications.  Verbal OK from  obtained to give 1 sample of Mounjaro 2.5 mg.  Patient will  sample.

## 2025-02-28 ENCOUNTER — TELEPHONE (OUTPATIENT)
Age: 45
End: 2025-02-28

## 2025-03-17 ENCOUNTER — OFFICE VISIT (OUTPATIENT)
Age: 45
End: 2025-03-17
Payer: MEDICARE

## 2025-03-17 VITALS
SYSTOLIC BLOOD PRESSURE: 134 MMHG | BODY MASS INDEX: 42.53 KG/M2 | RESPIRATION RATE: 16 BRPM | HEART RATE: 79 BPM | DIASTOLIC BLOOD PRESSURE: 59 MMHG | HEIGHT: 69 IN

## 2025-03-17 DIAGNOSIS — K76.0 HEPATIC STEATOSIS: Primary | ICD-10-CM

## 2025-03-17 DIAGNOSIS — R74.8 ABNORMAL LIVER ENZYMES: ICD-10-CM

## 2025-03-17 PROCEDURE — 3078F DIAST BP <80 MM HG: CPT | Performed by: STUDENT IN AN ORGANIZED HEALTH CARE EDUCATION/TRAINING PROGRAM

## 2025-03-17 PROCEDURE — 99204 OFFICE O/P NEW MOD 45 MIN: CPT | Performed by: STUDENT IN AN ORGANIZED HEALTH CARE EDUCATION/TRAINING PROGRAM

## 2025-03-17 PROCEDURE — 91200 LIVER ELASTOGRAPHY: CPT | Performed by: STUDENT IN AN ORGANIZED HEALTH CARE EDUCATION/TRAINING PROGRAM

## 2025-03-17 PROCEDURE — 3075F SYST BP GE 130 - 139MM HG: CPT | Performed by: STUDENT IN AN ORGANIZED HEALTH CARE EDUCATION/TRAINING PROGRAM

## 2025-03-17 NOTE — PROGRESS NOTES
Chief Complaint   Patient presents with    New Patient     BP (!) 134/59   Pulse 79   Resp 16   Ht 1.753 m (5' 9\")   BMI 42.53 kg/m²   1. Have you been to the ER, urgent care clinic since your last visit?  Hospitalized since your last visit?No    2. Have you seen or consulted any other health care providers outside of the [facility] since your last visit?  Include any pap smears or colon screening. No    
Metabolic risk factors include obesity, diabetes, hypertension and hyperlipidemia.   -  LSM 8.2kPa; ; IQR 21% - S3/F2  - The patient was counseled regarding diet and exercise to achieve weight loss. Recommend 10% reduction in body weight. The best diet for patients with steatotic liver is one with low in carbohydrates and enriched with protein such as mediterranean diet.   - The patient was counseled on importance of optimizing metabolic comorbidites including diabetes, hypertension, hyperlipidemia and obesity.  - The GLP1 agonist class of diabetic agents is effective in achieving weight loss and helping to control Type 2 DM.  Some of these agents are currently being evaluated in clinical trials as a possible treatment for MASLD.      2. Abnormal liver enzymes  - Etiology of abnormal liver enzymes at this time is unclear. Suspect steatotic liver disease  - Labs today for further investigation including hepatic function panel, BMP, CBC.  Will also check serologies for etiology of chronic liver disease including viral hepatitides, autoimmune disease, alpha 1 antitrypsin deficiency, iron overload, and abnormal copper metabolism.  -Complete abdominal ultrasound completed and shows steatosis  -Will reserve liver biopsy for now.  Pending results of studies above a liver biopsy may be needed the future to determine etiology of abnormal liver enzymes.    SYSTEM REVIEW NOT RELATED TO LIVER DISEASE OR REVIEWED ABOVE:  Constitution systems: Negative for fever, chills, weight gain, weight loss.   Eyes: Negative for visual changes.  ENT: Negative for sore throat, painful swallowing.   Respiratory: Negative for cough, hemoptysis, SOB.   Cardiology: Negative for chest pain, palpitations.  GI:  Negative for constipation or diarrhea.  : Negative for urinary frequency, dysuria, hematuria, nocturia.   Skin: Negative for rash.  Hematology: Negative for easy bruising, blood clots.    Musculo-skelatal: Negative for back pain,

## 2025-03-18 ENCOUNTER — RESULTS FOLLOW-UP (OUTPATIENT)
Age: 45
End: 2025-03-18

## 2025-03-18 DIAGNOSIS — R74.8 ABNORMAL LIVER ENZYMES: Primary | ICD-10-CM

## 2025-03-18 LAB
A1AT SERPL-MCNC: 132 MG/DL (ref 101–187)
ALBUMIN SERPL-MCNC: 4.1 G/DL (ref 4.1–5.1)
ALP SERPL-CCNC: 127 IU/L (ref 44–121)
ALT SERPL-CCNC: 35 IU/L (ref 0–44)
AST SERPL-CCNC: 26 IU/L (ref 0–40)
BILIRUB DIRECT SERPL-MCNC: 0.16 MG/DL (ref 0–0.4)
BILIRUB SERPL-MCNC: 0.4 MG/DL (ref 0–1.2)
CERULOPLASMIN SERPL-MCNC: 34 MG/DL (ref 16–31)
FERRITIN SERPL-MCNC: 161 NG/ML (ref 30–400)
HAV AB SER QL IA: NEGATIVE
HBV CORE AB SERPL QL IA: NEGATIVE
HBV SURFACE AB SER QL: NON REACTIVE
HCV AB SERPL QL IA: NON REACTIVE
HCV AB SERPL QL IA: NORMAL
IRON SATN MFR SERPL: 21 % (ref 15–55)
IRON SERPL-MCNC: 65 UG/DL (ref 38–169)
PROT SERPL-MCNC: 6.3 G/DL (ref 6–8.5)
TIBC SERPL-MCNC: 314 UG/DL (ref 250–450)
UIBC SERPL-MCNC: 249 UG/DL (ref 111–343)

## 2025-03-19 LAB
A2 MACROGLOB SERPL-MCNC: 203 MG/DL (ref 110–276)
ALT SERPL W P-5'-P-CCNC: 45 IU/L (ref 0–55)
ANA SER QL IF: POSITIVE
ANA SPECKLED TITR SER: ABNORMAL {TITER}
APO A-I SERPL-MCNC: 95 MG/DL (ref 101–178)
AST SERPL W P-5'-P-CCNC: 33 IU/L (ref 0–40)
BILIRUB SERPL-MCNC: 0.3 MG/DL (ref 0–1.2)
CHOLEST SERPL-MCNC: 117 MG/DL (ref 100–199)
FIBROSIS SCORING:: ABNORMAL
FIBROSIS STAGE SERPL QL: ABNORMAL
GGT SERPL-CCNC: 140 IU/L (ref 0–65)
GLUCOSE SERPL-MCNC: 331 MG/DL (ref 70–99)
HAPTOGLOB SERPL-MCNC: 153 MG/DL (ref 23–355)
LABORATORY COMMENT REPORT: ABNORMAL
LABORATORY COMMENT REPORT: ABNORMAL
LIVER FIBR SCORE SERPL CALC.FIBROSURE: 0.37 (ref 0–0.21)
LIVER STEATOSIS GRADE SERPL QL: ABNORMAL
LIVER STEATOSIS SCORE SERPL: 0.93 (ref 0–0.4)
MITOCHONDRIA M2 IGG SER-ACNC: <20 UNITS (ref 0–20)
NASH GRADE SERPL QL: ABNORMAL
NASH INTERPRETATION SERPL-IMP: ABNORMAL
NASH SCORE SERPL: 0.66 (ref 0–0.25)
NASH SCORING: ABNORMAL
SMA IGG SER-ACNC: 13 UNITS (ref 0–19)
STEATOSIS SCORING: ABNORMAL
TEST PERFORMANCE INFO SPEC: ABNORMAL
TEST PERFORMANCE INFO SPEC: ABNORMAL
TRIGL SERPL-MCNC: 299 MG/DL (ref 0–149)

## 2025-03-20 NOTE — RESULT ENCOUNTER NOTE
[place], can we please set up a liver biopsy? Thank you    Mr Nilda,    Your autoimmune screening is positive. Although is suspect the cause of your liver injury is fat, I would like to rule out autoimmune process. This will require a liver biopsy. Liver biopsy is an outpatient procedure that will require you to have a . I will ask my team to reach out and set this up.     Best,  Dr Bui

## 2025-03-31 ENCOUNTER — PREP FOR PROCEDURE (OUTPATIENT)
Age: 45
End: 2025-03-31

## 2025-03-31 DIAGNOSIS — K76.0 FATTY LIVER: ICD-10-CM

## 2025-03-31 DIAGNOSIS — R74.8 ELEVATED LIVER ENZYMES: ICD-10-CM

## 2025-04-03 ENCOUNTER — TELEPHONE (OUTPATIENT)
Age: 45
End: 2025-04-03

## 2025-04-03 NOTE — TELEPHONE ENCOUNTER
04/03/25 0957: Pt reminded of LBX scheduled on 4/10/25. Date, time,location and prep given. Pt verbalized understanding. SQ

## 2025-04-08 ENCOUNTER — TRANSCRIBE ORDERS (OUTPATIENT)
Facility: HOSPITAL | Age: 45
End: 2025-04-08

## 2025-04-08 DIAGNOSIS — K76.0 FATTY LIVER: Primary | ICD-10-CM

## 2025-04-09 ENCOUNTER — CLINICAL DOCUMENTATION (OUTPATIENT)
Age: 45
End: 2025-04-09

## 2025-04-10 ENCOUNTER — APPOINTMENT (OUTPATIENT)
Facility: HOSPITAL | Age: 45
End: 2025-04-10
Attending: INTERNAL MEDICINE
Payer: MEDICARE

## 2025-04-29 ENCOUNTER — HOSPITAL ENCOUNTER (EMERGENCY)
Facility: HOSPITAL | Age: 45
Discharge: HOME | End: 2025-04-30
Attending: EMERGENCY MEDICINE
Payer: MEDICARE

## 2025-04-29 ENCOUNTER — APPOINTMENT (OUTPATIENT)
Facility: HOSPITAL | Age: 45
End: 2025-04-29
Payer: MEDICARE

## 2025-04-29 DIAGNOSIS — R07.9 ACUTE CHEST PAIN: Primary | ICD-10-CM

## 2025-04-29 LAB
ALBUMIN SERPL-MCNC: 3.4 G/DL (ref 3.5–5)
ALBUMIN/GLOB SERPL: 0.9 (ref 1.1–2.2)
ALP SERPL-CCNC: 222 U/L (ref 45–117)
ALT SERPL-CCNC: 125 U/L (ref 12–78)
ANION GAP SERPL CALC-SCNC: 3 MMOL/L (ref 2–12)
AST SERPL-CCNC: 42 U/L (ref 15–37)
BASOPHILS # BLD: 0.07 K/UL (ref 0–0.1)
BASOPHILS NFR BLD: 0.8 % (ref 0–1)
BILIRUB SERPL-MCNC: 0.5 MG/DL (ref 0.2–1)
BUN SERPL-MCNC: 21 MG/DL (ref 6–20)
BUN/CREAT SERPL: 15 (ref 12–20)
CALCIUM SERPL-MCNC: 9.4 MG/DL (ref 8.5–10.1)
CHLORIDE SERPL-SCNC: 94 MMOL/L (ref 97–108)
CO2 SERPL-SCNC: 34 MMOL/L (ref 21–32)
CREAT SERPL-MCNC: 1.41 MG/DL (ref 0.7–1.3)
DIFFERENTIAL METHOD BLD: ABNORMAL
EOSINOPHIL # BLD: 0.21 K/UL (ref 0–0.4)
EOSINOPHIL NFR BLD: 2.4 % (ref 0–7)
ERYTHROCYTE [DISTWIDTH] IN BLOOD BY AUTOMATED COUNT: 13.6 % (ref 11.5–14.5)
GLOBULIN SER CALC-MCNC: 3.9 G/DL (ref 2–4)
GLUCOSE SERPL-MCNC: 320 MG/DL (ref 65–100)
HCT VFR BLD AUTO: 39.2 % (ref 36.6–50.3)
HGB BLD-MCNC: 13.3 G/DL (ref 12.1–17)
IMM GRANULOCYTES # BLD AUTO: 0.14 K/UL (ref 0–0.04)
IMM GRANULOCYTES NFR BLD AUTO: 1.6 % (ref 0–0.5)
LYMPHOCYTES # BLD: 2.34 K/UL (ref 0.8–3.5)
LYMPHOCYTES NFR BLD: 26.2 % (ref 12–49)
MCH RBC QN AUTO: 28.3 PG (ref 26–34)
MCHC RBC AUTO-ENTMCNC: 33.9 G/DL (ref 30–36.5)
MCV RBC AUTO: 83.4 FL (ref 80–99)
MONOCYTES # BLD: 0.7 K/UL (ref 0–1)
MONOCYTES NFR BLD: 7.8 % (ref 5–13)
NEUTS SEG # BLD: 5.46 K/UL (ref 1.8–8)
NEUTS SEG NFR BLD: 61.2 % (ref 32–75)
NRBC # BLD: 0 K/UL (ref 0–0.01)
NRBC BLD-RTO: 0 PER 100 WBC
PLATELET # BLD AUTO: 179 K/UL (ref 150–400)
PMV BLD AUTO: 10.4 FL (ref 8.9–12.9)
POTASSIUM SERPL-SCNC: 3.8 MMOL/L (ref 3.5–5.1)
PROT SERPL-MCNC: 7.3 G/DL (ref 6.4–8.2)
RBC # BLD AUTO: 4.7 M/UL (ref 4.1–5.7)
SODIUM SERPL-SCNC: 131 MMOL/L (ref 136–145)
TROPONIN I SERPL HS-MCNC: 10 NG/L (ref 0–76)
TROPONIN I SERPL HS-MCNC: 9 NG/L (ref 0–76)
WBC # BLD AUTO: 8.9 K/UL (ref 4.1–11.1)

## 2025-04-29 PROCEDURE — 84484 ASSAY OF TROPONIN QUANT: CPT

## 2025-04-29 PROCEDURE — 99285 EMERGENCY DEPT VISIT HI MDM: CPT

## 2025-04-29 PROCEDURE — 71045 X-RAY EXAM CHEST 1 VIEW: CPT

## 2025-04-29 PROCEDURE — 93005 ELECTROCARDIOGRAM TRACING: CPT | Performed by: EMERGENCY MEDICINE

## 2025-04-29 PROCEDURE — 36415 COLL VENOUS BLD VENIPUNCTURE: CPT

## 2025-04-29 PROCEDURE — 85025 COMPLETE CBC W/AUTO DIFF WBC: CPT

## 2025-04-29 PROCEDURE — 6370000000 HC RX 637 (ALT 250 FOR IP): Performed by: EMERGENCY MEDICINE

## 2025-04-29 PROCEDURE — 80053 COMPREHEN METABOLIC PANEL: CPT

## 2025-04-29 RX ORDER — FAMOTIDINE 20 MG/1
20 TABLET, FILM COATED ORAL ONCE
Status: COMPLETED | OUTPATIENT
Start: 2025-04-29 | End: 2025-04-29

## 2025-04-29 RX ADMIN — FAMOTIDINE 20 MG: 20 TABLET, FILM COATED ORAL at 20:25

## 2025-04-29 RX ADMIN — LIDOCAINE HYDROCHLORIDE 40 ML: 20 SOLUTION ORAL at 20:24

## 2025-04-29 ASSESSMENT — HEART SCORE: ECG: NON-SPECIFC REPOLARIZATION DISTURBANCE/LBTB/PM

## 2025-04-30 ENCOUNTER — OFFICE VISIT (OUTPATIENT)
Age: 45
End: 2025-04-30
Payer: MEDICARE

## 2025-04-30 VITALS
HEART RATE: 72 BPM | TEMPERATURE: 98 F | SYSTOLIC BLOOD PRESSURE: 121 MMHG | OXYGEN SATURATION: 98 % | DIASTOLIC BLOOD PRESSURE: 67 MMHG | WEIGHT: 285 LBS | BODY MASS INDEX: 42.21 KG/M2 | HEIGHT: 69 IN | RESPIRATION RATE: 18 BRPM

## 2025-04-30 VITALS
TEMPERATURE: 98.2 F | RESPIRATION RATE: 19 BRPM | HEART RATE: 69 BPM | SYSTOLIC BLOOD PRESSURE: 122 MMHG | DIASTOLIC BLOOD PRESSURE: 54 MMHG | OXYGEN SATURATION: 92 %

## 2025-04-30 DIAGNOSIS — F41.1 GENERALIZED ANXIETY DISORDER: ICD-10-CM

## 2025-04-30 DIAGNOSIS — F43.29 ADJUSTMENT DISORDER WITH OTHER SYMPTOMS: ICD-10-CM

## 2025-04-30 LAB
EKG ATRIAL RATE: 69 BPM
EKG DIAGNOSIS: NORMAL
EKG P AXIS: 59 DEGREES
EKG P-R INTERVAL: 166 MS
EKG Q-T INTERVAL: 390 MS
EKG QRS DURATION: 86 MS
EKG QTC CALCULATION (BAZETT): 417 MS
EKG R AXIS: 48 DEGREES
EKG T AXIS: 94 DEGREES
EKG VENTRICULAR RATE: 69 BPM

## 2025-04-30 PROCEDURE — 3074F SYST BP LT 130 MM HG: CPT | Performed by: NURSE PRACTITIONER

## 2025-04-30 PROCEDURE — 93005 ELECTROCARDIOGRAM TRACING: CPT | Performed by: EMERGENCY MEDICINE

## 2025-04-30 PROCEDURE — 3078F DIAST BP <80 MM HG: CPT | Performed by: NURSE PRACTITIONER

## 2025-04-30 PROCEDURE — 99214 OFFICE O/P EST MOD 30 MIN: CPT | Performed by: NURSE PRACTITIONER

## 2025-04-30 RX ORDER — BUSPIRONE HYDROCHLORIDE 15 MG/1
15 TABLET ORAL 2 TIMES DAILY
Qty: 60 TABLET | Refills: 2 | Status: SHIPPED | OUTPATIENT
Start: 2025-04-30

## 2025-04-30 RX ORDER — FAMOTIDINE 40 MG/1
20 TABLET, FILM COATED ORAL 2 TIMES DAILY
Qty: 30 TABLET | Refills: 0 | Status: SHIPPED | OUTPATIENT
Start: 2025-04-30

## 2025-04-30 RX ORDER — M-VIT,TX,IRON,MINS/CALC/FOLIC 27MG-0.4MG
1 TABLET ORAL DAILY
COMMUNITY

## 2025-04-30 RX ORDER — VITAMIN E (DL,TOCOPHERYL ACET) 180 MG
CAPSULE ORAL
COMMUNITY

## 2025-04-30 RX ORDER — SERTRALINE HYDROCHLORIDE 100 MG/1
100 TABLET, FILM COATED ORAL DAILY
Qty: 30 TABLET | Refills: 2 | Status: SHIPPED | OUTPATIENT
Start: 2025-04-30

## 2025-04-30 ASSESSMENT — ANXIETY QUESTIONNAIRES
3. WORRYING TOO MUCH ABOUT DIFFERENT THINGS: SEVERAL DAYS
4. TROUBLE RELAXING: SEVERAL DAYS
1. FEELING NERVOUS, ANXIOUS, OR ON EDGE: SEVERAL DAYS
GAD7 TOTAL SCORE: 6
IF YOU CHECKED OFF ANY PROBLEMS ON THIS QUESTIONNAIRE, HOW DIFFICULT HAVE THESE PROBLEMS MADE IT FOR YOU TO DO YOUR WORK, TAKE CARE OF THINGS AT HOME, OR GET ALONG WITH OTHER PEOPLE: NOT DIFFICULT AT ALL
7. FEELING AFRAID AS IF SOMETHING AWFUL MIGHT HAPPEN: SEVERAL DAYS
6. BECOMING EASILY ANNOYED OR IRRITABLE: MORE THAN HALF THE DAYS
2. NOT BEING ABLE TO STOP OR CONTROL WORRYING: NOT AT ALL
5. BEING SO RESTLESS THAT IT IS HARD TO SIT STILL: NOT AT ALL

## 2025-04-30 ASSESSMENT — PATIENT HEALTH QUESTIONNAIRE - PHQ9
SUM OF ALL RESPONSES TO PHQ QUESTIONS 1-9: 6
5. POOR APPETITE OR OVEREATING: MORE THAN HALF THE DAYS
1. LITTLE INTEREST OR PLEASURE IN DOING THINGS: SEVERAL DAYS
4. FEELING TIRED OR HAVING LITTLE ENERGY: MORE THAN HALF THE DAYS
8. MOVING OR SPEAKING SO SLOWLY THAT OTHER PEOPLE COULD HAVE NOTICED. OR THE OPPOSITE, BEING SO FIGETY OR RESTLESS THAT YOU HAVE BEEN MOVING AROUND A LOT MORE THAN USUAL: NOT AT ALL
6. FEELING BAD ABOUT YOURSELF - OR THAT YOU ARE A FAILURE OR HAVE LET YOURSELF OR YOUR FAMILY DOWN: NOT AT ALL
SUM OF ALL RESPONSES TO PHQ QUESTIONS 1-9: 6
7. TROUBLE CONCENTRATING ON THINGS, SUCH AS READING THE NEWSPAPER OR WATCHING TELEVISION: NOT AT ALL
SUM OF ALL RESPONSES TO PHQ QUESTIONS 1-9: 6
9. THOUGHTS THAT YOU WOULD BE BETTER OFF DEAD, OR OF HURTING YOURSELF: NOT AT ALL
2. FEELING DOWN, DEPRESSED OR HOPELESS: NOT AT ALL
3. TROUBLE FALLING OR STAYING ASLEEP: SEVERAL DAYS
10. IF YOU CHECKED OFF ANY PROBLEMS, HOW DIFFICULT HAVE THESE PROBLEMS MADE IT FOR YOU TO DO YOUR WORK, TAKE CARE OF THINGS AT HOME, OR GET ALONG WITH OTHER PEOPLE: SOMEWHAT DIFFICULT
SUM OF ALL RESPONSES TO PHQ QUESTIONS 1-9: 6

## 2025-04-30 NOTE — DISCHARGE INSTRUCTIONS
Thank You!    It was a pleasure taking care of you in our Emergency Department today. We know that when you come to our Emergency Department, you are entrusting us with your health, comfort, and safety. Our physicians and nurses honor that trust, and truly appreciate the opportunity to care for you and your loved ones.      We also value your feedback. If you receive a survey about your Emergency Department experience today, please fill it out.  We care about our patients' feedback, and we listen to what you have to say.  Thank you.    Bakari Perez MD  ________________________________________________________________________  I have included a copy of your lab results and/or radiologic studies from today's visit so you can have them easily available at your follow-up visit. We hope you feel better and please do not hesitate to contact the ED if you have any questions at all!    Recent Results (from the past 12 hours)   EKG 12 Lead    Collection Time: 04/29/25  7:17 PM   Result Value Ref Range    Ventricular Rate 80 BPM    Atrial Rate 80 BPM    P-R Interval 166 ms    QRS Duration 86 ms    Q-T Interval 366 ms    QTc Calculation (Bazett) 422 ms    P Axis 54 degrees    R Axis 55 degrees    T Axis 128 degrees    Diagnosis       Normal sinus rhythm  ST & T wave abnormality, consider lateral ischemia  Abnormal ECG  When compared with ECG of 28-NOV-2022 10:03,  Nonspecific T wave abnormality has replaced inverted T waves in Inferior   leads     CBC with Auto Differential    Collection Time: 04/29/25  8:00 PM   Result Value Ref Range    WBC 8.9 4.1 - 11.1 K/uL    RBC 4.70 4.10 - 5.70 M/uL    Hemoglobin 13.3 12.1 - 17.0 g/dL    Hematocrit 39.2 36.6 - 50.3 %    MCV 83.4 80.0 - 99.0 FL    MCH 28.3 26.0 - 34.0 PG    MCHC 33.9 30.0 - 36.5 g/dL    RDW 13.6 11.5 - 14.5 %    Platelets 179 150 - 400 K/uL    MPV 10.4 8.9 - 12.9 FL    Nucleated RBCs 0.0 0  WBC    nRBC 0.00 0.00 - 0.01 K/uL    Neutrophils % 61.2 32.0 - 75.0 %

## 2025-04-30 NOTE — ED PROVIDER NOTES
the findings documented in the MDM section.     Interpretation per the Radiologist below, if available at the time of this note:     XR CHEST PORTABLE    (Results Pending)        PROCEDURES   Unless otherwise noted below or in ED Course, none  Procedures     CRITICAL CARE TIME       EMERGENCY DEPARTMENT COURSE and DIFFERENTIAL DIAGNOSIS/MDM   Vitals:    Vitals:    04/29/25 1927   BP: (!) 155/68   Pulse: 81   Resp: 18   Temp: 98.2 °F (36.8 °C)   TempSrc: Oral   SpO2: 98%        Patient was given the following medications:  Medications   famotidine (PEPCID) tablet 20 mg (20 mg Oral Given 4/29/25 2025)   mylanta/viscous lidocaine (GI COCKTAIL) (40 mLs Oral Given 4/29/25 2024)       Medical Decision Making  45-year-old presenting with left upper quadrant and chest pain in the setting of running out of PPI    He is well-appearing, hemodynamically stable in no acute respiratory distress, abdomen soft and nontender.      Differential diagnosis includes ACS, gastritis with esophagitis, consider pulmonary embolus with low suspicion    Will obtain CBC to evaluate for leukocytosis or anemia, CMP evaluate for metabolic derangement LARA transaminitis.  Will obtain EKG troponin to evaluate for ACS.  Will trial GI cocktail and Pepcid during ED stay.  Patient is nontachycardic, nontachypneic, no unilateral limb swelling, no recent surgery, have a very low suspicion for PE given the above.    Amount and/or Complexity of Data Reviewed  Labs: ordered. Decision-making details documented in ED Course.  Radiology: ordered and independent interpretation performed. Decision-making details documented in ED Course.  ECG/medicine tests: ordered.    Risk  Prescription drug management.      ED Course as of 05/02/25 1352   Tue Apr 29, 2025   2154 Chest pain resolved following GI cocktail and Pepcid [WB]   2154 Initial troponin 9, will follow-up repeat [WB]   2154 He is hyperglycemic to 320 with normal anion gap [WB]   2353 EKG shows new changes

## 2025-04-30 NOTE — PROGRESS NOTES
AM 1/21/2025     2:24 PM 6/26/2024    10:53 AM   PHQ-9    Little interest or pleasure in doing things 1 0 1   Feeling down, depressed, or hopeless 0 0 1   Trouble falling or staying asleep, or sleeping too much 1  1   Feeling tired or having little energy 1  1   Poor appetite or overeating 1  1   Feeling bad about yourself - or that you are a failure or have let yourself or your family down 0  0   Trouble concentrating on things, such as reading the newspaper or watching television 0  0   Moving or speaking so slowly that other people could have noticed. Or the opposite - being so fidgety or restless that you have been moving around a lot more than usual 0  0   Thoughts that you would be better off dead, or of hurting yourself in some way 0  0   PHQ-2 Score 1 0 2   PHQ-9 Total Score 4 0 5   If you checked off any problems, how difficult have these problems made it for you to do your work, take care of things at home, or get along with other people? 0  0     generalized anxiety disorder      1/30/2025    10:30 AM 6/26/2024    10:53 AM 4/29/2024     1:46 PM   SADE-7 SCREENING   Feeling nervous, anxious, or on edge Several days Several days Several days   Not being able to stop or control worrying Several days Several days Not at all   Worrying too much about different things Several days Several days Several days   Trouble relaxing Several days Several days Several days   Being so restless that it is hard to sit still Not at all Several days Several days   Becoming easily annoyed or irritable Several days Several days Several days   Feeling afraid as if something awful might happen Not at all Several days Several days   SADE-7 Total Score 5 7 6   How difficult have these problems made it for you to do your work, take care of things at home, or get along with other people? Not difficult at all Not difficult at all Somewhat difficult        \"Have you been to the ER, urgent care clinic since your last visit?  Hospitalized 
is hard to sit still Not at all Not at all Several days   Becoming easily annoyed or irritable More than half the days Several days Several days   Feeling afraid as if something awful might happen Several days Not at all Several days   SADE-7 Total Score 6 5 7   How difficult have these problems made it for you to do your work, take care of things at home, or get along with other people? Not difficult at all Not difficult at all Not difficult at all        MEDICAL DECISION MAKING:  Problems addressed today:    ICD-10-CM    1. Generalized anxiety disorder  F41.1 busPIRone (BUSPAR) 15 MG tablet     sertraline (ZOLOFT) 50 MG tablet     sertraline (ZOLOFT) 100 MG tablet      2. Adjustment disorder with other symptoms  F43.29 sertraline (ZOLOFT) 50 MG tablet     sertraline (ZOLOFT) 100 MG tablet         Assessment:   Kyree  is responding to treatment. Symptoms are exacerbated by stress. Discussed current medications and dosages. Mutually agreed to continue with current regimen. Patient to communicate if symptoms worsen or fail to improve before their next scheduled visit. Risks, benefits, and side effects of medications to include drug to drug interactions were reviewed and discussed in detail. Patient agreed that the potential benefit from a medication trial outweighs the acknowledged risks. Reviewed treatment goals and target symptoms to monitor for.     Plan:  1. MEDICATION:        medication changes made today:     Current Outpatient Medications:     famotidine (PEPCID) 40 MG tablet, Take 0.5 tablets by mouth 2 times daily, Disp: 30 tablet, Rfl: 0    Multiple Vitamins-Minerals (THERAPEUTIC MULTIVITAMIN-MINERALS) tablet, Take 1 tablet by mouth daily, Disp: , Rfl:     Probiotic Product (PROBIOTIC ACIDOPHILUS) CHEW, Take by mouth, Disp: , Rfl:     busPIRone (BUSPAR) 15 MG tablet, Take 15 mg by mouth 2 times daily, Disp: 60 tablet, Rfl: 2    sertraline (ZOLOFT) 50 MG tablet, Take 1 tablet by mouth daily, Disp: 30 tablet,

## 2025-05-01 DIAGNOSIS — E03.9 ACQUIRED HYPOTHYROIDISM: ICD-10-CM

## 2025-05-01 DIAGNOSIS — E11.42 TYPE 2 DIABETES MELLITUS WITH DIABETIC POLYNEUROPATHY, WITH LONG-TERM CURRENT USE OF INSULIN (HCC): ICD-10-CM

## 2025-05-01 DIAGNOSIS — Z79.4 TYPE 2 DIABETES MELLITUS WITH DIABETIC POLYNEUROPATHY, WITH LONG-TERM CURRENT USE OF INSULIN (HCC): ICD-10-CM

## 2025-05-02 LAB
EKG ATRIAL RATE: 80 BPM
EKG DIAGNOSIS: NORMAL
EKG P AXIS: 54 DEGREES
EKG P-R INTERVAL: 166 MS
EKG Q-T INTERVAL: 366 MS
EKG QRS DURATION: 86 MS
EKG QTC CALCULATION (BAZETT): 422 MS
EKG R AXIS: 55 DEGREES
EKG T AXIS: 128 DEGREES
EKG VENTRICULAR RATE: 80 BPM

## 2025-05-07 ENCOUNTER — HOSPITAL ENCOUNTER (OUTPATIENT)
Facility: HOSPITAL | Age: 45
Discharge: HOME | End: 2025-05-10
Payer: MEDICARE

## 2025-05-07 DIAGNOSIS — M54.2 NECK PAIN: ICD-10-CM

## 2025-05-07 PROCEDURE — 72050 X-RAY EXAM NECK SPINE 4/5VWS: CPT

## 2025-05-09 ENCOUNTER — RESULTS FOLLOW-UP (OUTPATIENT)
Age: 45
End: 2025-05-09

## 2025-05-15 ENCOUNTER — TRANSCRIBE ORDERS (OUTPATIENT)
Facility: HOSPITAL | Age: 45
End: 2025-05-15

## 2025-05-15 DIAGNOSIS — K76.0 FATTY LIVER: Primary | ICD-10-CM

## 2025-05-16 DIAGNOSIS — F43.29 ADJUSTMENT DISORDER WITH OTHER SYMPTOMS: ICD-10-CM

## 2025-05-16 DIAGNOSIS — F41.1 GENERALIZED ANXIETY DISORDER: ICD-10-CM

## 2025-05-21 ENCOUNTER — TELEPHONE (OUTPATIENT)
Age: 45
End: 2025-05-21

## 2025-05-21 NOTE — TELEPHONE ENCOUNTER
5/21/25 1408: Spoke with pt. Date, time, location and prep given for scheduled LBX. Pt verbalized understanding. SQ

## 2025-05-29 ENCOUNTER — APPOINTMENT (OUTPATIENT)
Facility: HOSPITAL | Age: 45
End: 2025-05-29
Attending: INTERNAL MEDICINE
Payer: MEDICARE

## 2025-05-29 ENCOUNTER — HOSPITAL ENCOUNTER (OUTPATIENT)
Facility: HOSPITAL | Age: 45
Setting detail: OUTPATIENT SURGERY
Discharge: HOME | End: 2025-05-29
Attending: INTERNAL MEDICINE | Admitting: INTERNAL MEDICINE
Payer: MEDICARE

## 2025-05-29 VITALS
WEIGHT: 270 LBS | HEART RATE: 71 BPM | TEMPERATURE: 97.6 F | SYSTOLIC BLOOD PRESSURE: 125 MMHG | OXYGEN SATURATION: 98 % | BODY MASS INDEX: 39.99 KG/M2 | HEIGHT: 69 IN | DIASTOLIC BLOOD PRESSURE: 52 MMHG | RESPIRATION RATE: 13 BRPM

## 2025-05-29 DIAGNOSIS — K76.0 FATTY LIVER: ICD-10-CM

## 2025-05-29 PROCEDURE — 7100000010 HC PHASE II RECOVERY - FIRST 15 MIN: Performed by: INTERNAL MEDICINE

## 2025-05-29 PROCEDURE — 88307 TISSUE EXAM BY PATHOLOGIST: CPT

## 2025-05-29 PROCEDURE — 88313 SPECIAL STAINS GROUP 2: CPT

## 2025-05-29 PROCEDURE — 6360000002 HC RX W HCPCS: Performed by: INTERNAL MEDICINE

## 2025-05-29 PROCEDURE — 3600007502: Performed by: INTERNAL MEDICINE

## 2025-05-29 PROCEDURE — 76942 ECHO GUIDE FOR BIOPSY: CPT

## 2025-05-29 PROCEDURE — 2709999900 HC NON-CHARGEABLE SUPPLY: Performed by: INTERNAL MEDICINE

## 2025-05-29 PROCEDURE — 7100000011 HC PHASE II RECOVERY - ADDTL 15 MIN: Performed by: INTERNAL MEDICINE

## 2025-05-29 RX ORDER — MIDAZOLAM HYDROCHLORIDE 5 MG/5ML
5 INJECTION, SOLUTION INTRAMUSCULAR; INTRAVENOUS ONCE
Status: DISCONTINUED | OUTPATIENT
Start: 2025-05-29 | End: 2025-05-29 | Stop reason: HOSPADM

## 2025-05-29 RX ORDER — SODIUM CHLORIDE 0.9 % (FLUSH) 0.9 %
5-40 SYRINGE (ML) INJECTION PRN
Status: DISCONTINUED | OUTPATIENT
Start: 2025-05-29 | End: 2025-05-29 | Stop reason: HOSPADM

## 2025-05-29 RX ORDER — LIDOCAINE HYDROCHLORIDE 10 MG/ML
10 INJECTION, SOLUTION EPIDURAL; INFILTRATION; INTRACAUDAL; PERINEURAL ONCE
Status: DISCONTINUED | OUTPATIENT
Start: 2025-05-29 | End: 2025-05-29 | Stop reason: HOSPADM

## 2025-05-29 RX ORDER — FENTANYL CITRATE 50 UG/ML
25 INJECTION, SOLUTION INTRAMUSCULAR; INTRAVENOUS AS NEEDED
Status: DISCONTINUED | OUTPATIENT
Start: 2025-05-29 | End: 2025-05-29 | Stop reason: HOSPADM

## 2025-05-29 RX ORDER — ONDANSETRON 2 MG/ML
2 INJECTION INTRAMUSCULAR; INTRAVENOUS AS NEEDED
Status: DISCONTINUED | OUTPATIENT
Start: 2025-05-29 | End: 2025-05-29 | Stop reason: HOSPADM

## 2025-05-29 RX ORDER — SODIUM CHLORIDE 9 MG/ML
INJECTION, SOLUTION INTRAVENOUS PRN
Status: DISCONTINUED | OUTPATIENT
Start: 2025-05-29 | End: 2025-05-29 | Stop reason: HOSPADM

## 2025-05-29 RX ORDER — SODIUM CHLORIDE 0.9 % (FLUSH) 0.9 %
5-40 SYRINGE (ML) INJECTION EVERY 12 HOURS SCHEDULED
Status: DISCONTINUED | OUTPATIENT
Start: 2025-05-29 | End: 2025-05-29 | Stop reason: HOSPADM

## 2025-05-29 RX ADMIN — FENTANYL CITRATE 25 MCG: 50 INJECTION, SOLUTION INTRAMUSCULAR; INTRAVENOUS at 15:02

## 2025-05-29 RX ADMIN — ONDANSETRON 2 MG: 2 INJECTION, SOLUTION INTRAMUSCULAR; INTRAVENOUS at 14:59

## 2025-05-29 RX ADMIN — FENTANYL CITRATE 25 MCG: 50 INJECTION, SOLUTION INTRAMUSCULAR; INTRAVENOUS at 15:26

## 2025-05-29 ASSESSMENT — PAIN - FUNCTIONAL ASSESSMENT
PAIN_FUNCTIONAL_ASSESSMENT: NONE - DENIES PAIN
PAIN_FUNCTIONAL_ASSESSMENT: NONE - DENIES PAIN

## 2025-05-29 ASSESSMENT — PAIN SCALES - GENERAL
PAINLEVEL_OUTOF10: 5
PAINLEVEL_OUTOF10: 5
PAINLEVEL_OUTOF10: 3

## 2025-05-29 ASSESSMENT — PAIN DESCRIPTION - ORIENTATION
ORIENTATION: RIGHT

## 2025-05-29 ASSESSMENT — PAIN DESCRIPTION - LOCATION
LOCATION: SHOULDER
LOCATION: INCISION;SHOULDER
LOCATION: INCISION;SHOULDER

## 2025-05-29 ASSESSMENT — PAIN DESCRIPTION - DESCRIPTORS: DESCRIPTORS: PRESSURE

## 2025-05-29 NOTE — OP NOTE
[facility]     Desmond Gay MD, FACP, MACG, FAASLD   MD Ghazal Cagle, ROSI Snyder, [facility]   Consuelo Wilson, [facility]  Bautista Jauregui, FNP-C   Zoe Jiménezy, [facility]         Liver [facility] [place][note 2]   [address], [address]   [place]  [address] [phone]   FAX: [phone]  Liver [facility]   [address], [address]   [place]  [address] [phone]   FAX: [phone]         LIVER BIOPSY PROCEDURE NOTE    NAME: Kyree Munguia  :  1980  MRN:  [id number]    INDICATIONS/PRE-OPERATIVE  DIAGNOSIS:  Elevated liver transaminases     : Desmond Gay MD    SURGICAL ASSISTANT:  None    PROSTHETIC DEVICES, TISSUE GRAFTS, TRANSPLANTED ORGANS:  Not applicable    SEDATION: 1% Lidocaine injection 10 ml    PROCEDURE:  Informed consent to perform the procedure was obtained from the patient.  The patient was positioned on the edge of the stretcher lying flat in the supine position.  Ultrasound was utilized to image the liver.  The diaphragm and any major mass lesion or vascular structures within the liver were identified.  An appropriate site for liver biopsy was identified.  The distance from the surface of the skin to the liver capsule was 4 cm.  This area was prepped with betadine and draped in sterile fashion.  The skin was infiltrated with 1% lidocaine.  The deeper subcutanous tissues and liver capsule overlying the biopsy site were then infiltrated with 1% lidocaine until appropriate anesthesia was obtained.  A small incision was made in the skin so the biopsy devise could be easily inserted.  A total of 2 passes with the 16 gauge Bard biopsy devise was then made into the liver.  Core(s) of liver tissue totaling 4 cm in length were obtained and placed into

## 2025-05-29 NOTE — DISCHARGE INSTRUCTIONS
[facility] LIVER [facility] LIVER [facility]     Desmond Gay MD, FACP, MACG, FAASLD   MD Ghazal Cagle, ROSI Snyder, [facility]   Consuelo Wilson, [facility]  Bautista Juventino, FNP-C   Zoe Luciano, [facility]         Liver [facility]   [address], [address]   [place]  [address] [phone]   FAX: [phone]  Liver [facility]   [address]   [place]  [address] [phone]   FAX: [phone]         LIVER BIOPSY DISCHARGE INSTRUCTIONS      Kyree MORIS Munguia  1980  Date: 5/29/2025    DIET:    You may resume your previous diet.    ACTIVITIES:  Rest quietly the rest of today.  You should not lift any objects more than 20 pounds for the next 2 days.  If you work sitting down without strenuous activity you may return to work tomorrow.  If you exert yourself or do heavy lifting at work you should take tomorrow off.  Do not drive or operate hazardous machinery for 12 hours after you are discharged from this procedure.    SPECIAL INSTRUCTIONS:  Do not use any aspirin or non-steroidal (Motin, Advil, Naproxen, etc) pain medications for the next 2 days.  You may use extra-strength Tylenol (acetaminophen) if you experience pain or discomfort later today.     Restarting blood thinners:  If you were taking blood thinners prior to the procedure you can restart these in 2 days.    Call the Liver [facility] office if you experience any of the following:  Persistent or severe abdominal pain.  Persistent or severe abdominal distention.  Fever and chills   Nausea and vomiting.  New or unusual symptoms.    Follow-up care:  You should have a follow up appointment with Dr. Gay to review the results of the liver biopsy results in 2 weeks.  If you do not have an appointment please call the office at

## 2025-05-29 NOTE — H&P
[facility]     Desmond Gay MD, FACP, MACG, FAASLD   MD Ghazal Cagle, ROSI Snyder, [facility]   Consuelo Wilson, [facility]  Bautista Jauregui, FNP-C   Zoe Luciano, [facility]         Liver [facility]   [address], [address]   [place]  [address] [phone]   FAX: [phone]  [facility]   [address], [address]   [place]  [address] [phone]   FAX: [phone]       PRE-PROCEDURE NOTE - LIVER BIOPSY    H and P from last office visit reviewed.    Allergies reviewed.  Out-patient medication list reviewed.    Patient Active Problem List   Diagnosis    Migraine without aura    Acquired hypothyroidism    Insomnia    Stable proliferative diabetic retinopathy of both eyes associated with type 1 diabetes mellitus (HCC)    Fibromyalgia    Unstable angina (HCC)    Hypertriglyceridemia    Anxiety    Microalbuminuria    Class 3 severe obesity due to excess calories with serious comorbidity and body mass index (BMI) of 40.0 to 44.9 in adult (HCC)    Chest pain    Spondylosis of cervical spine    Multiple lacunar infarcts (HCC)    Low back pain    Essential hypertension    Dysthymia    Adjustment disorder with mixed emotional features    Anxiety disorder due to general medical condition with panic attack    Dizzy spells    Diabetes mellitus (HCC)    Corneal abrasion, right    Coronary artery disease involving native coronary artery of native heart with unstable angina pectoris (HCC)    DM (diabetes mellitus) (HCC)    Bilateral carotid artery stenosis    History of acute myocardial infarction    CAD (coronary artery disease)    S/P CABG x 1    S/P AVR (aortic valve replacement) and aortoplasty    Occipital neuralgia of right side    MDD (major depressive disorder), recurrent  percussion/palpation.  Spleen not palpable. No obvious ascites.  Extremities: No edema.  No muscle wasting.  No gross arthritic changes.  Neurologic: Alert and oriented.  Cranial nerves grossly intact.  No asterixis.        MOST RECENT LABS:  Lab Results   Component Value Date    WBC 8.9 04/29/2025    HGB 13.3 04/29/2025     04/29/2025     Lab Results   Component Value Date    AST 42 (H) 04/29/2025     (H) 04/29/2025    BILITOT 0.5 04/29/2025     Lab Results   Component Value Date    INR 1.1 12/06/2022       PRE-PROCEDURE DOCUMENTATION  The risks of the procedure were discussed with the patient.  These included reaction to anesthesia, pain, perforation and bleeding.    All questions were answered.    The patient wishes to proceed with the procedure.    ASA status  2  Airway assessment:  NA      ASSESSMENT AND PLAN:  Liver biopsy under ultrasound guidance.    Desmond Gay MD  [address], [address]  [place]  [address] [phone]  [facility]

## 2025-05-30 DIAGNOSIS — Z79.4 TYPE 2 DIABETES MELLITUS WITH DIABETIC POLYNEUROPATHY, WITH LONG-TERM CURRENT USE OF INSULIN (HCC): Primary | ICD-10-CM

## 2025-05-30 DIAGNOSIS — E11.42 TYPE 2 DIABETES MELLITUS WITH DIABETIC POLYNEUROPATHY, WITH LONG-TERM CURRENT USE OF INSULIN (HCC): Primary | ICD-10-CM

## 2025-05-30 NOTE — TELEPHONE ENCOUNTER
[facility] states that the Mounjaro Rx was canceled. Advised  did not cancel the prescription (see 2/27/25). An RN canceled as a \"list clean up\". Pharmacist states needs new Rx, as cannot reverse the cancellation.

## 2025-06-03 RX ORDER — FAMOTIDINE 40 MG/1
20 TABLET, FILM COATED ORAL 2 TIMES DAILY
Qty: 180 TABLET | Refills: 1 | Status: SHIPPED | OUTPATIENT
Start: 2025-06-03

## 2025-06-03 NOTE — TELEPHONE ENCOUNTER
PCP: Kike Harrison MD    Last appt:   11/20/2024    Future Appointments   Date Time Provider Department Center   6/13/2025 10:00 AM Desmond Gay MD LIVR [facility]   6/24/2025  2:30 PM Kike Harrison MD [facility]   7/10/2025 11:50 AM Nicholas Vail MD RDE [facility] P[facility]   7/30/2025  2:00 PM Freya Retana APRN - NP [facility]   9/19/2025  1:30 PM Precious Plaza ANP [facility]   9/26/2025  1:00 PM Duyen Bui MD [facility]       Requested Prescriptions     Pending Prescriptions Disp Refills    famotidine (PEPCID) 40 MG tablet 180 tablet 1     Sig: Take 0.5 tablets by mouth 2 times daily

## 2025-06-13 ENCOUNTER — OFFICE VISIT (OUTPATIENT)
Age: 45
End: 2025-06-13
Payer: MEDICARE

## 2025-06-13 VITALS
HEART RATE: 77 BPM | WEIGHT: 273.4 LBS | DIASTOLIC BLOOD PRESSURE: 63 MMHG | SYSTOLIC BLOOD PRESSURE: 119 MMHG | HEIGHT: 69 IN | BODY MASS INDEX: 40.49 KG/M2 | OXYGEN SATURATION: 98 % | TEMPERATURE: 97.2 F

## 2025-06-13 DIAGNOSIS — Z95.5 HISTORY OF CORONARY ARTERY STENT PLACEMENT: Primary | ICD-10-CM

## 2025-06-13 DIAGNOSIS — Z90.49 HISTORY OF CHOLECYSTECTOMY: ICD-10-CM

## 2025-06-13 PROBLEM — R74.8 ELEVATED LIVER ENZYMES: Status: RESOLVED | Noted: 2025-03-31 | Resolved: 2025-06-13

## 2025-06-13 PROBLEM — K76.0 FATTY LIVER: Status: RESOLVED | Noted: 2025-03-31 | Resolved: 2025-06-13

## 2025-06-13 PROBLEM — F33.0 MILD EPISODE OF RECURRENT MAJOR DEPRESSIVE DISORDER: Status: RESOLVED | Noted: 2024-06-26 | Resolved: 2025-06-13

## 2025-06-13 PROBLEM — R42 DIZZY SPELLS: Status: RESOLVED | Noted: 2021-08-23 | Resolved: 2025-06-13

## 2025-06-13 PROBLEM — R07.9 CHEST PAIN: Status: RESOLVED | Noted: 2019-11-28 | Resolved: 2025-06-13

## 2025-06-13 PROBLEM — H52.4 MYOPIA WITH ASTIGMATISM AND PRESBYOPIA: Status: RESOLVED | Noted: 2022-03-15 | Resolved: 2025-06-13

## 2025-06-13 PROBLEM — F41.0 ANXIETY DISORDER DUE TO GENERAL MEDICAL CONDITION WITH PANIC ATTACK: Status: RESOLVED | Noted: 2020-03-24 | Resolved: 2025-06-13

## 2025-06-13 PROBLEM — F34.1 DYSTHYMIA: Status: RESOLVED | Noted: 2020-03-24 | Resolved: 2025-06-13

## 2025-06-13 PROBLEM — H52.209 MYOPIA WITH ASTIGMATISM AND PRESBYOPIA: Status: RESOLVED | Noted: 2022-03-15 | Resolved: 2025-06-13

## 2025-06-13 PROBLEM — I20.0 UNSTABLE ANGINA (HCC): Status: RESOLVED | Noted: 2019-11-28 | Resolved: 2025-06-13

## 2025-06-13 PROBLEM — M54.2 NECK PAIN: Status: RESOLVED | Noted: 2025-01-21 | Resolved: 2025-06-13

## 2025-06-13 PROBLEM — E11.3299 RETINOPATHY, DIABETIC, BACKGROUND (HCC): Status: ACTIVE | Noted: 2022-01-19

## 2025-06-13 PROBLEM — I25.110 CORONARY ARTERY DISEASE INVOLVING NATIVE CORONARY ARTERY OF NATIVE HEART WITH UNSTABLE ANGINA PECTORIS (HCC): Status: RESOLVED | Noted: 2019-12-03 | Resolved: 2025-06-13

## 2025-06-13 PROBLEM — H52.10 MYOPIA WITH ASTIGMATISM AND PRESBYOPIA: Status: RESOLVED | Noted: 2022-03-15 | Resolved: 2025-06-13

## 2025-06-13 PROBLEM — F06.4 ANXIETY DISORDER DUE TO GENERAL MEDICAL CONDITION WITH PANIC ATTACK: Status: RESOLVED | Noted: 2020-03-24 | Resolved: 2025-06-13

## 2025-06-13 PROCEDURE — 99214 OFFICE O/P EST MOD 30 MIN: CPT | Performed by: INTERNAL MEDICINE

## 2025-06-13 PROCEDURE — 3074F SYST BP LT 130 MM HG: CPT | Performed by: INTERNAL MEDICINE

## 2025-06-13 PROCEDURE — 3078F DIAST BP <80 MM HG: CPT | Performed by: INTERNAL MEDICINE

## 2025-06-13 ASSESSMENT — PATIENT HEALTH QUESTIONNAIRE - PHQ9
1. LITTLE INTEREST OR PLEASURE IN DOING THINGS: NOT AT ALL
7. TROUBLE CONCENTRATING ON THINGS, SUCH AS READING THE NEWSPAPER OR WATCHING TELEVISION: NOT AT ALL
5. POOR APPETITE OR OVEREATING: NOT AT ALL
8. MOVING OR SPEAKING SO SLOWLY THAT OTHER PEOPLE COULD HAVE NOTICED. OR THE OPPOSITE, BEING SO FIGETY OR RESTLESS THAT YOU HAVE BEEN MOVING AROUND A LOT MORE THAN USUAL: NOT AT ALL
4. FEELING TIRED OR HAVING LITTLE ENERGY: NOT AT ALL
SUM OF ALL RESPONSES TO PHQ QUESTIONS 1-9: 0
9. THOUGHTS THAT YOU WOULD BE BETTER OFF DEAD, OR OF HURTING YOURSELF: NOT AT ALL
SUM OF ALL RESPONSES TO PHQ QUESTIONS 1-9: 0
3. TROUBLE FALLING OR STAYING ASLEEP: NOT AT ALL
DEPRESSION UNABLE TO ASSESS: FUNCTIONAL CAPACITY MOTIVATION LIMITS ACCURACY
SUM OF ALL RESPONSES TO PHQ QUESTIONS 1-9: 0
SUM OF ALL RESPONSES TO PHQ QUESTIONS 1-9: 0
6. FEELING BAD ABOUT YOURSELF - OR THAT YOU ARE A FAILURE OR HAVE LET YOURSELF OR YOUR FAMILY DOWN: NOT AT ALL
2. FEELING DOWN, DEPRESSED OR HOPELESS: NOT AT ALL
10. IF YOU CHECKED OFF ANY PROBLEMS, HOW DIFFICULT HAVE THESE PROBLEMS MADE IT FOR YOU TO DO YOUR WORK, TAKE CARE OF THINGS AT HOME, OR GET ALONG WITH OTHER PEOPLE: NOT DIFFICULT AT ALL

## 2025-06-13 NOTE — PROGRESS NOTES
[facility]     Desmond Gay MD, FACP, MACG, FAASLD   MD Ghazal Cagle PA-C April S Ashworth, [facility]BC   Consuelo Wilson, [employer]-   Nicolasa Partida, FNP-C  Bautista Jauregui, FNP-C   Zoe Luciano, [facility]   [address], [address]   [place]  [address] [phone]   FAX: [phone]  [facility]   [address], [address]   [place]  [address] [phone]   FAX: [phone]       Patient Care Team:  Kike Harrison MD as PCP - General  Kike Harrison MD as PCP - Empaneled Provider  Nicholas Vail MD as Consulting Physician  Sultana Velasquez MD as Physician      Patient Active Problem List   Diagnosis    Migraine without aura    Acquired hypothyroidism    Insomnia    Retinopathy, diabetic, background ([facility])    Fibromyalgia    Hypertriglyceridemia    Class 3 severe obesity due to excess calories with serious comorbidity and body mass index (BMI) of 40.0 to 44.9 in adult ([facility])    Spondylosis of cervical spine    Multiple lacunar infarcts ([facility])    Low back pain    Essential hypertension    Adjustment disorder with mixed emotional features    Type 2 diabetes mellitus (HCC)    Corneal abrasion, right    Bilateral carotid artery stenosis    History of acute myocardial infarction    CAD (coronary artery disease)    S/P CABG x 1    S/P AVR (aortic valve replacement) and aortoplasty    Occipital neuralgia of right side    MDD (major depressive disorder), recurrent episode, moderate ([facility])    Generalized anxiety disorder    Post-traumatic stress disorder, unspecified    Dry eyes    Sciatica of left side    Leg swelling    Cervicogenic headache    Chronic kidney disease, stage 3a ([facility])    History of stroke    History of coronary artery stent placement    Stable

## 2025-06-13 NOTE — PROGRESS NOTES
Chief Complaint   Patient presents with    Follow-up     N/A     Vitals:    06/13/25 1008   BP: 119/63   BP Site: Left Upper Arm   Patient Position: Sitting   Pulse: 77   Temp: 97.2 °F (36.2 °C)   TempSrc: Temporal   SpO2: 98%   Weight: 124 kg (273 lb 6.4 oz)   Height: 1.753 m (5' 9\")     .  \"Have you been to the ER, urgent care clinic since your last visit?  Hospitalized since your last visit?\"    NO    “Have you seen or consulted any other health care providers outside of [facility] since your last visit?”    NO            Click Here for Release of Records Request

## 2025-06-18 DIAGNOSIS — E03.9 ACQUIRED HYPOTHYROIDISM: ICD-10-CM

## 2025-06-19 RX ORDER — LEVOTHYROXINE SODIUM 175 UG/1
TABLET ORAL
Qty: 90 TABLET | Refills: 1 | Status: SHIPPED | OUTPATIENT
Start: 2025-06-19

## 2025-06-24 ENCOUNTER — OFFICE VISIT (OUTPATIENT)
Age: 45
End: 2025-06-24
Payer: MEDICARE

## 2025-06-24 VITALS
HEIGHT: 69 IN | DIASTOLIC BLOOD PRESSURE: 71 MMHG | SYSTOLIC BLOOD PRESSURE: 119 MMHG | WEIGHT: 273.8 LBS | OXYGEN SATURATION: 97 % | HEART RATE: 71 BPM | RESPIRATION RATE: 16 BRPM | TEMPERATURE: 98.4 F | BODY MASS INDEX: 40.55 KG/M2

## 2025-06-24 DIAGNOSIS — F33.1 MDD (MAJOR DEPRESSIVE DISORDER), RECURRENT EPISODE, MODERATE (HCC): ICD-10-CM

## 2025-06-24 DIAGNOSIS — I10 ESSENTIAL HYPERTENSION: Primary | ICD-10-CM

## 2025-06-24 DIAGNOSIS — I25.110 CORONARY ARTERY DISEASE INVOLVING NATIVE CORONARY ARTERY OF NATIVE HEART WITH UNSTABLE ANGINA PECTORIS (HCC): ICD-10-CM

## 2025-06-24 DIAGNOSIS — E10.3553 STABLE PROLIFERATIVE DIABETIC RETINOPATHY OF BOTH EYES ASSOCIATED WITH TYPE 1 DIABETES MELLITUS (HCC): ICD-10-CM

## 2025-06-24 DIAGNOSIS — N18.31 CHRONIC KIDNEY DISEASE, STAGE 3A (HCC): ICD-10-CM

## 2025-06-24 PROCEDURE — 3078F DIAST BP <80 MM HG: CPT | Performed by: INTERNAL MEDICINE

## 2025-06-24 PROCEDURE — 99214 OFFICE O/P EST MOD 30 MIN: CPT | Performed by: INTERNAL MEDICINE

## 2025-06-24 PROCEDURE — 3046F HEMOGLOBIN A1C LEVEL >9.0%: CPT | Performed by: INTERNAL MEDICINE

## 2025-06-24 PROCEDURE — 3074F SYST BP LT 130 MM HG: CPT | Performed by: INTERNAL MEDICINE

## 2025-06-24 RX ORDER — DOXYCYCLINE HYCLATE 100 MG
100 TABLET ORAL 2 TIMES DAILY
Qty: 14 TABLET | Refills: 0 | Status: SHIPPED | OUTPATIENT
Start: 2025-06-24 | End: 2025-07-01

## 2025-06-24 SDOH — ECONOMIC STABILITY: FOOD INSECURITY: WITHIN THE PAST 12 MONTHS, YOU WORRIED THAT YOUR FOOD WOULD RUN OUT BEFORE YOU GOT MONEY TO BUY MORE.: NEVER TRUE

## 2025-06-24 SDOH — ECONOMIC STABILITY: FOOD INSECURITY: WITHIN THE PAST 12 MONTHS, THE FOOD YOU BOUGHT JUST DIDN'T LAST AND YOU DIDN'T HAVE MONEY TO GET MORE.: SOMETIMES TRUE

## 2025-06-24 NOTE — PROGRESS NOTES
PROGRESS NOTE  Name: Kyree Munguia   : 1980       ASSESSMENT/ PLAN:       Hypertension: BP is fine today.   Type 2 diabetes with nephropathy (HCC): Follows with Dr. Vail--defer labs to him. He is seeing ophthalmologist for retinopathy.   Coronary artery disease involving native coronary artery of native heart with unstable angina pectoris (HCC): s/p CABG x1. As per Dr. Gibson, cardiology.   CKD stage 3: Avoid nephrotoxic agents.   Hypertriglyceridemia:  Abnormal LFT: He has Fatty liver disease, and has had follow up with hepatology, Dr. Gay. US in May 2024 shows steatosis, hepatomegaly, and splenomegaly. Work on weight.   Leg swelling is ongoing, better lately. Likely venous insufficiency. Negative dulpex in 2024. He has had negative echo in . Recent labs don't indicate kidney failure or loss of synthetic liver function. Compression and elevation as previously advised. He is seeing cardiologist also.   Left sciatica: Following with neurology. I refilled his robaxin.  Aortic valve stenosis, etiology of cardiac valve disease unspecified: S/P AVR (aortic valve replacement) and aortoplasty [Z95.2 (ICD-10-CM)] in . Follow up as per CT surgery and Cardiology--Dr. Gibson.   Morbid obesity: He was unable to tolerate GLP 1 agent from endocrinology. Work on diet, exercise--I emphasized portion size control. Consider bariatric procdure down the road.   Adjustment disorder with mixed emotional features / Anxiety disorder due to general medical condition with panic attack: Continue follow up with Psychiatry.   Insomnia disorder with non-sleep disorder mental comorbidity: Continue follow up with psychiatrist.   Multiple lacunar infarcts (HCC): Per neurology.   Dizzy spells: Seeing neurology.   Cervical spondylosis with radiculopathy: Ongoing, not relieved with ANNY. Sees Dr. Burroughs, pain management.   GERD: Improved; seeing GI at [facility].   Paronychia: Rx for doxycycline. If it doesn't resolve,

## 2025-06-24 NOTE — PROGRESS NOTES
Have you been to the ER, urgent care clinic since your last visit?  Hospitalized since your last visit?   no    Have you seen or consulted any other health care providers outside our system since your last visit?   no      “Have you had a diabetic eye exam?”    no    Date of last diabetic eye exam: 10/20/2021

## 2025-07-02 PROBLEM — E10.3553 STABLE PROLIFERATIVE DIABETIC RETINOPATHY OF BOTH EYES ASSOCIATED WITH TYPE 1 DIABETES MELLITUS (HCC): Status: RESOLVED | Noted: 2025-06-24 | Resolved: 2025-07-02

## 2025-07-02 PROBLEM — Z90.49 HISTORY OF CHOLECYSTECTOMY: Status: ACTIVE | Noted: 2025-07-02

## 2025-07-09 ENCOUNTER — HOSPITAL ENCOUNTER (EMERGENCY)
Facility: HOSPITAL | Age: 45
Discharge: HOME | End: 2025-07-09
Payer: MEDICARE

## 2025-07-09 ENCOUNTER — APPOINTMENT (OUTPATIENT)
Facility: HOSPITAL | Age: 45
End: 2025-07-09
Payer: MEDICARE

## 2025-07-09 VITALS
HEART RATE: 83 BPM | BODY MASS INDEX: 40.86 KG/M2 | DIASTOLIC BLOOD PRESSURE: 64 MMHG | OXYGEN SATURATION: 99 % | SYSTOLIC BLOOD PRESSURE: 143 MMHG | WEIGHT: 276.68 LBS | TEMPERATURE: 98.2 F | RESPIRATION RATE: 14 BRPM

## 2025-07-09 DIAGNOSIS — Z23 NEED FOR TDAP VACCINATION: ICD-10-CM

## 2025-07-09 DIAGNOSIS — S99.922A FOOT INJURY, LEFT, INITIAL ENCOUNTER: Primary | ICD-10-CM

## 2025-07-09 DIAGNOSIS — R11.0 NAUSEA: ICD-10-CM

## 2025-07-09 PROCEDURE — 90471 IMMUNIZATION ADMIN: CPT

## 2025-07-09 PROCEDURE — 73630 X-RAY EXAM OF FOOT: CPT

## 2025-07-09 PROCEDURE — 6360000002 HC RX W HCPCS

## 2025-07-09 PROCEDURE — 6370000000 HC RX 637 (ALT 250 FOR IP)

## 2025-07-09 PROCEDURE — 90715 TDAP VACCINE 7 YRS/> IM: CPT

## 2025-07-09 PROCEDURE — 99284 EMERGENCY DEPT VISIT MOD MDM: CPT

## 2025-07-09 RX ORDER — CEPHALEXIN 500 MG/1
500 CAPSULE ORAL 4 TIMES DAILY
Qty: 28 CAPSULE | Refills: 0 | Status: SHIPPED | OUTPATIENT
Start: 2025-07-09 | End: 2025-07-16

## 2025-07-09 RX ORDER — OXYCODONE HYDROCHLORIDE 5 MG/1
5 TABLET ORAL EVERY 6 HOURS PRN
Qty: 12 TABLET | Refills: 0 | Status: SHIPPED | OUTPATIENT
Start: 2025-07-09 | End: 2025-07-12

## 2025-07-09 RX ORDER — NAPROXEN 250 MG/1
500 TABLET ORAL
Status: COMPLETED | OUTPATIENT
Start: 2025-07-09 | End: 2025-07-09

## 2025-07-09 RX ORDER — ACETAMINOPHEN 325 MG/1
650 TABLET ORAL
Status: COMPLETED | OUTPATIENT
Start: 2025-07-09 | End: 2025-07-09

## 2025-07-09 RX ORDER — ONDANSETRON 4 MG/1
4 TABLET, ORALLY DISINTEGRATING ORAL EVERY 8 HOURS PRN
Qty: 20 TABLET | Refills: 0 | Status: SHIPPED | OUTPATIENT
Start: 2025-07-09

## 2025-07-09 RX ORDER — HYDROCODONE BITARTRATE AND ACETAMINOPHEN 5; 325 MG/1; MG/1
1 TABLET ORAL
Refills: 0 | Status: COMPLETED | OUTPATIENT
Start: 2025-07-09 | End: 2025-07-09

## 2025-07-09 RX ADMIN — TETANUS TOXOID, REDUCED DIPHTHERIA TOXOID AND ACELLULAR PERTUSSIS VACCINE, ADSORBED 0.5 ML: 5; 2.5; 8; 8; 2.5 SUSPENSION INTRAMUSCULAR at 18:44

## 2025-07-09 RX ADMIN — CEPHALEXIN 500 MG: 250 CAPSULE ORAL at 19:58

## 2025-07-09 RX ADMIN — NAPROXEN 500 MG: 250 TABLET ORAL at 18:43

## 2025-07-09 RX ADMIN — HYDROCODONE BITARTRATE AND ACETAMINOPHEN 1 TABLET: 5; 325 TABLET ORAL at 18:43

## 2025-07-09 RX ADMIN — ACETAMINOPHEN 650 MG: 325 TABLET ORAL at 18:43

## 2025-07-09 NOTE — ED PROVIDER NOTES
[facility] EMERGENCY DEPARTMENT  EMERGENCY DEPARTMENT ENCOUNTER       Pt Name: Kyree uMnguia  MRN: [id number]  Birthdate 1980  Date of Evaluation: 7/9/2025  Provider: OMID Danielson - MAIDA   PCP: Kike Harrison MD  Note Started: 5:40 PM 7/9/25     CHIEF COMPLAINT       Chief Complaint   Patient presents with    Foot Injury     Pt ambulatory to triage with c/o left foot pain. Pt reports that about a week ago he got a piece of metal in his foot. His foot has been hurting since then. Pt thought most of it came out already, but wants to be checked d/t continual pain. Pt reports he was wearing sneakers when he stepped on the metal.         HISTORY OF PRESENT ILLNESS: 1 or more elements      History From: Patient  HPI Limitations None     Kyree Munguia is a 45 y.o. male with PMHx as noted below who presents to the ED today for concerns regarding left foot pain x 1 week when patient had piece of metal wire goes through his foot.  Patient states it did not go through his shoe but that the wire was in his shoe and then stabbed into his foot.  Patient removed the piece of metal and has been cleaning the wound with peroxide and Neosporin.  History of poorly controlled diabetes.  Last Tdap unknown.  Denies fevers, chills, nausea, vomiting, chest pain, abdominal pain.  Patient recently started doxycycline p.o. 3 days ago for ingrown toenail infection.  His PCP had ordered it a couple weeks ago prior to this injury.  Patient does not have a podiatrist.     See MDM Documentation for further HPI and PMHx     Nursing Notes were all reviewed and agreed with or any disagreements were addressed in the HPI.     REVIEW OF SYSTEMS      Review of Systems     Positives and Pertinent negatives as per HPI.    PAST HISTORY     Past Medical History:  Past Medical History:   Diagnosis Date    Acute non-Q wave non-ST elevation myocardial infarction (NSTEMI) ([facility]) 11/15/2022    Anxiety and depression     anxiety,

## 2025-07-10 ENCOUNTER — OFFICE VISIT (OUTPATIENT)
Age: 45
End: 2025-07-10
Payer: MEDICARE

## 2025-07-10 VITALS
HEART RATE: 66 BPM | DIASTOLIC BLOOD PRESSURE: 64 MMHG | HEIGHT: 69 IN | WEIGHT: 275 LBS | BODY MASS INDEX: 40.73 KG/M2 | SYSTOLIC BLOOD PRESSURE: 107 MMHG

## 2025-07-10 DIAGNOSIS — E11.42 TYPE 2 DIABETES MELLITUS WITH DIABETIC POLYNEUROPATHY, WITH LONG-TERM CURRENT USE OF INSULIN (HCC): Primary | ICD-10-CM

## 2025-07-10 DIAGNOSIS — Z79.4 TYPE 2 DIABETES MELLITUS WITH DIABETIC POLYNEUROPATHY, WITH LONG-TERM CURRENT USE OF INSULIN (HCC): Primary | ICD-10-CM

## 2025-07-10 DIAGNOSIS — E78.2 MIXED HYPERLIPIDEMIA: ICD-10-CM

## 2025-07-10 DIAGNOSIS — E03.9 ACQUIRED HYPOTHYROIDISM: ICD-10-CM

## 2025-07-10 LAB — HBA1C MFR BLD: 10.1 %

## 2025-07-10 PROCEDURE — G2211 COMPLEX E/M VISIT ADD ON: HCPCS | Performed by: INTERNAL MEDICINE

## 2025-07-10 PROCEDURE — 99215 OFFICE O/P EST HI 40 MIN: CPT | Performed by: INTERNAL MEDICINE

## 2025-07-10 PROCEDURE — 3074F SYST BP LT 130 MM HG: CPT | Performed by: INTERNAL MEDICINE

## 2025-07-10 PROCEDURE — 83036 HEMOGLOBIN GLYCOSYLATED A1C: CPT | Performed by: INTERNAL MEDICINE

## 2025-07-10 PROCEDURE — 3078F DIAST BP <80 MM HG: CPT | Performed by: INTERNAL MEDICINE

## 2025-07-10 PROCEDURE — 3046F HEMOGLOBIN A1C LEVEL >9.0%: CPT | Performed by: INTERNAL MEDICINE

## 2025-07-10 NOTE — PROGRESS NOTES
Chief Complaint   Patient presents with    Diabetes     PCP and Pharmacy verified     History of Present Illness: Kyree Munguia is a 45 y.o. male here for follow up of diabetes.  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 Alonzo of Cardiology.    He was not able to tolerate the Actos due to edema. He stopped taking the Trulicity it was causing issues of nausea.    \"I had a colonoscopy in August 2024, they found 3 polyps, but they were non-cancerous. They said that what is going on with my stomach is diabetes related.\"    At our last visit in January 2025 his A1C was 10.8%. He was not able to tolerate the Actos or Trulicity due to side effects and they had to be stopped. I instructed him to continue the Toujeo 120 units BID and Novolin R to 70 units with each meal, plus the 5:50 correction for  BG >150 and I started him on Mounjaro.    Pt has not been back to see me since January 2025.    We have been in frequent contact since our last visit making adjustment to his insulin regimen because his BG continue to run high.    Pt has a liver biopsy in May 2025 which showed steatosis and hepatomegaly.    \"I was in the ER last night. I got a piece of metal in my foot last week and it developed into a knot on my foot. They did an x-ray and did not see an more metal in my foot. They gave me Abx and sent me home. They want me to see a foot doctor.\"    Pt is currently taking Toujeo U-300 100 units in the morning and 100 units HS, He is also taking Humulin R 20-90 units with each meal, plus correction. Pt has been taking Mounjaro 5mg every Wednesday.  He has been tolerating the Mounjaro 5mg dose.    His A1C today was 10.1%.     Pt notes that he will have symptomatic hypoglycemia when his BG run in the 80-90s.    He has not been wearing the DexCom during the summer.    He is testing his BG 4 times per day. His BG have BG have been in the

## 2025-07-16 ENCOUNTER — CLINICAL DOCUMENTATION (OUTPATIENT)
Age: 45
End: 2025-07-16

## 2025-07-16 LAB
ALBUMIN SERPL-MCNC: 4.3 G/DL (ref 4.1–5.1)
ALP SERPL-CCNC: 169 IU/L (ref 44–121)
ALT SERPL-CCNC: 49 IU/L (ref 0–44)
AST SERPL-CCNC: 27 IU/L (ref 0–40)
BILIRUB SERPL-MCNC: 0.6 MG/DL (ref 0–1.2)
BUN SERPL-MCNC: 20 MG/DL (ref 6–24)
BUN/CREAT SERPL: 18 (ref 9–20)
CALCIUM SERPL-MCNC: 9.6 MG/DL (ref 8.7–10.2)
CHLORIDE SERPL-SCNC: 92 MMOL/L (ref 96–106)
CHOLEST SERPL-MCNC: 138 MG/DL (ref 100–199)
CO2 SERPL-SCNC: 24 MMOL/L (ref 20–29)
CREAT SERPL-MCNC: 1.13 MG/DL (ref 0.76–1.27)
EGFRCR SERPLBLD CKD-EPI 2021: 81 ML/MIN/1.73
GLOBULIN SER CALC-MCNC: 2.6 G/DL (ref 1.5–4.5)
GLUCOSE SERPL-MCNC: 418 MG/DL (ref 70–99)
HDLC SERPL-MCNC: 30 MG/DL
IMP & REVIEW OF LAB RESULTS: NORMAL
LDLC SERPL CALC-MCNC: 52 MG/DL (ref 0–99)
LDLC SERPL DIRECT ASSAY-MCNC: 44 MG/DL (ref 0–99)
Lab: ABNORMAL
Lab: NORMAL
POTASSIUM SERPL-SCNC: 5 MMOL/L (ref 3.5–5.2)
PROT SERPL-MCNC: 6.9 G/DL (ref 6–8.5)
SODIUM SERPL-SCNC: 131 MMOL/L (ref 134–144)
T4 FREE SERPL-MCNC: 1.36 NG/DL (ref 0.82–1.77)
TRIGL SERPL-MCNC: 365 MG/DL (ref 0–149)
TSH SERPL DL<=0.005 MIU/L-ACNC: 0.86 UIU/ML (ref 0.45–4.5)
VLDLC SERPL CALC-MCNC: 56 MG/DL (ref 5–40)

## 2025-07-16 NOTE — PROGRESS NOTES
Nicholas Vail MD to Kyree MORIS Munguia        7/16/25  7:20 AM  Component      Latest Ref Rng 7/15/2025   Glucose      70 - 99 mg/dL 418 (H)    BUN,BUNPL      6 - 24 mg/dL 20    Creatinine      0.76 - 1.27 mg/dL 1.13    Est, Glom Filt Rate      >59 mL/min/1.73 81    Bun/Cre      9 - 20  18    Sodium      134 - 144 mmol/L 131 (L)    Potassium      3.5 - 5.2 mmol/L 5.0    Chloride      96 - 106 mmol/L 92 (L)    CARBON DIOXIDE      20 - 29 mmol/L 24    Calcium      8.7 - 10.2 mg/dL 9.6    Total Protein      6.0 - 8.5 g/dL 6.9    Albumin      4.1 - 5.1 g/dL 4.3    Globulin, Total      1.5 - 4.5 g/dL 2.6    Total Bilirubin      0.0 - 1.2 mg/dL 0.6    Alkaline Phosphatase      44 - 121 IU/L 169 (H)    AST      0 - 40 IU/L 27    ALT      0 - 44 IU/L 49 (H)    Cholesterol, Total      100 - 199 mg/dL 138    Triglycerides      0 - 149 mg/dL 365 (H)    HDL Cholesterol      >39 mg/dL 30 (L)    VLDL      5 - 40 mg/dL 56 (H)    LDL Cholesterol      0 - 99 mg/dL 52    Comments Comment    LDL, Direct      0 - 99 mg/dL 44    TSH, 3rd Generation      0.450 - 4.500 uIU/mL 0.865    T4 Free      0.82 - 1.77 ng/dL 1.36

## 2025-07-31 ENCOUNTER — OFFICE VISIT (OUTPATIENT)
Age: 45
End: 2025-07-31
Payer: MEDICARE

## 2025-07-31 VITALS
HEIGHT: 69 IN | DIASTOLIC BLOOD PRESSURE: 72 MMHG | WEIGHT: 275 LBS | HEART RATE: 75 BPM | TEMPERATURE: 97.9 F | SYSTOLIC BLOOD PRESSURE: 126 MMHG | RESPIRATION RATE: 20 BRPM | BODY MASS INDEX: 40.73 KG/M2 | OXYGEN SATURATION: 98 %

## 2025-07-31 DIAGNOSIS — F41.1 GENERALIZED ANXIETY DISORDER: ICD-10-CM

## 2025-07-31 DIAGNOSIS — F43.29 ADJUSTMENT DISORDER WITH OTHER SYMPTOMS: ICD-10-CM

## 2025-07-31 PROCEDURE — 3074F SYST BP LT 130 MM HG: CPT | Performed by: NURSE PRACTITIONER

## 2025-07-31 PROCEDURE — 99214 OFFICE O/P EST MOD 30 MIN: CPT | Performed by: NURSE PRACTITIONER

## 2025-07-31 PROCEDURE — 3078F DIAST BP <80 MM HG: CPT | Performed by: NURSE PRACTITIONER

## 2025-07-31 RX ORDER — SERTRALINE HYDROCHLORIDE 100 MG/1
100 TABLET, FILM COATED ORAL DAILY
Qty: 30 TABLET | Refills: 2 | Status: SHIPPED | OUTPATIENT
Start: 2025-07-31

## 2025-07-31 RX ORDER — BUSPIRONE HYDROCHLORIDE 15 MG/1
15 TABLET ORAL 2 TIMES DAILY
Qty: 60 TABLET | Refills: 2 | Status: SHIPPED | OUTPATIENT
Start: 2025-07-31

## 2025-07-31 ASSESSMENT — PATIENT HEALTH QUESTIONNAIRE - PHQ9
10. IF YOU CHECKED OFF ANY PROBLEMS, HOW DIFFICULT HAVE THESE PROBLEMS MADE IT FOR YOU TO DO YOUR WORK, TAKE CARE OF THINGS AT HOME, OR GET ALONG WITH OTHER PEOPLE: NOT DIFFICULT AT ALL
9. THOUGHTS THAT YOU WOULD BE BETTER OFF DEAD, OR OF HURTING YOURSELF: NOT AT ALL
SUM OF ALL RESPONSES TO PHQ QUESTIONS 1-9: 7
3. TROUBLE FALLING OR STAYING ASLEEP: MORE THAN HALF THE DAYS
SUM OF ALL RESPONSES TO PHQ QUESTIONS 1-9: 7
8. MOVING OR SPEAKING SO SLOWLY THAT OTHER PEOPLE COULD HAVE NOTICED. OR THE OPPOSITE, BEING SO FIGETY OR RESTLESS THAT YOU HAVE BEEN MOVING AROUND A LOT MORE THAN USUAL: NOT AT ALL
SUM OF ALL RESPONSES TO PHQ QUESTIONS 1-9: 7
7. TROUBLE CONCENTRATING ON THINGS, SUCH AS READING THE NEWSPAPER OR WATCHING TELEVISION: NOT AT ALL
4. FEELING TIRED OR HAVING LITTLE ENERGY: MORE THAN HALF THE DAYS
5. POOR APPETITE OR OVEREATING: MORE THAN HALF THE DAYS
SUM OF ALL RESPONSES TO PHQ QUESTIONS 1-9: 7
1. LITTLE INTEREST OR PLEASURE IN DOING THINGS: SEVERAL DAYS
6. FEELING BAD ABOUT YOURSELF - OR THAT YOU ARE A FAILURE OR HAVE LET YOURSELF OR YOUR FAMILY DOWN: NOT AT ALL
2. FEELING DOWN, DEPRESSED OR HOPELESS: NOT AT ALL

## 2025-07-31 NOTE — PROGRESS NOTES
Chief Complaint   Patient presents with    Medication Check      Vitals:    07/31/25 1117   BP: 126/72   Pulse: 75   Resp: 20   Temp: 97.9 °F (36.6 °C)   SpO2: 98%      Prior to Admission medications    Medication Sig Start Date End Date Taking? Authorizing Provider   Tirzepatide (MOUNJARO) 7.5 MG/0.5ML SOAJ pen Inject 7.5 mg into the skin every 7 days E11.65. His insurance has approved Mounjaro 7/10/25   Nicholas Vail MD   ondansetron (ZOFRAN-ODT) 4 MG disintegrating tablet Take 1 tablet by mouth every 8 hours as needed for Nausea or Vomiting 7/9/25   Ernestine Ty APRN - NP   levothyroxine (SYNTHROID) 175 MCG tablet TAKE 1 TABLET BY MOUTH  FIRST THING IN THE MORNING BY ITSELF WITH WATER ONLY, WAIT 30 MINUTES BEFORE YOU EAT OR TAKE ANY OTHER TABLETS 6/19/25   Nicholas Vail MD   famotidine (PEPCID) 40 MG tablet Take 0.5 tablets by mouth 2 times daily 6/3/25   Kike Harrison MD   Multiple Vitamins-Minerals (THERAPEUTIC MULTIVITAMIN-MINERALS) tablet Take 1 tablet by mouth daily    ProviderCain MD   Probiotic Product (PROBIOTIC ACIDOPHILUS) CHEW Take by mouth    Cain Bates MD   busPIRone (BUSPAR) 15 MG tablet Take 15 mg by mouth 2 times daily 4/30/25   Freya Retana APRN - NP   sertraline (ZOLOFT) 50 MG tablet Take 1 tablet by mouth daily 4/30/25   Freya Retana APRN - NP   sertraline (ZOLOFT) 100 MG tablet Take 1 tablet by mouth daily 4/30/25   Freya Retana APRN - NP   GAS RELIEF 80 MG chewable tablet CHEW AND SWALLOW 1 TABLET BY MOUTH EVERY 6 HOURS AS NEEDED FOR FLATULENCE 2/14/25   Kike Harrison MD   insulin regular (HUMULIN R) 100 UNIT/ML injection 70 units with breakfast, 70 units with lunch and 70 units with dinner, plus correction, max daily dose 300 units 1/14/25   Nicholas Vail MD   Insulin Glargine, 2 Unit Dial, (TOUJEO MAX SOLOSTAR) 300 UNIT/ML concentrated injection pen Take 120 units every morning and 120 units at 
Counseling and coordination of care including instructions for treatment, risks/benefits, risk factor reduction and patient/family education. He agrees with the plan. Patient instructed to call with any side effects, questions or issues.   3.PSYCHOTHERAPY: Patient was provided with supportive therapy, strongly encourage to seek psychotherapy.    4. MEDICAL CARE: Patient was strongly encourage to take their medical medications and follow up with their PCP on regular basis.    5. SUBSTANCE ABUSE CARE: Patient denies a smoking, drinking, abusing any illicit drugs.  6. Patient was informed that in case of emergency to go to the nearest ER or call 911.     Patient was given time to ask questions and voice concerns. I believe all questions concerns were adequately addressed at this office visit. Patient verbalized agreement and understanding of the above-stated plan.    Follow-up and Dispositions    Return in about 3 months (around 10/31/2025).       7/31/2025  OMID Quintanilla NP

## (undated) DEVICE — CATH GUID COR AL75S 6FR 100CM -- LAUNCHER

## (undated) DEVICE — SET PERF L203CM 12IN RED AND BLU AORT ROOT MULT SLIP CONN

## (undated) DEVICE — SOLUTION IV 1000ML 140MEQ/L SOD 5MEQ/L K 3MEQ/L MG 27MEQ/L

## (undated) DEVICE — IRRIGATION KT PIST SYR 60ML -- CONVERT TO ITEM 116415

## (undated) DEVICE — RADIFOCUS OPTITORQUE ANGIOGRAPHIC CATHETER: Brand: OPTITORQUE

## (undated) DEVICE — CANNULA PERF ART HI FLO VENT DIL TIP J TIP 3/8IN CONN 22FR

## (undated) DEVICE — OPEN HEART A- RICHMOND: Brand: MEDLINE INDUSTRIES, INC.

## (undated) DEVICE — BAG RED 3PLY 2MIL 30X40 IN

## (undated) DEVICE — CONTINU-FLO SOLUTION SET, 2 INJECTION SITES, MALE LUER LOCK ADAPTER WITH RETRACTABLE COLLAR, LARGE BORE STOPCOCK WITH ROTATING MALE LUER LOCK EXTENSION SET, 2 INJECTION SITES, MALE LUER LOCK ADAPTER WITH RETRACTABLE COLLAR: Brand: INTERLINK/CONTINU-FLO

## (undated) DEVICE — Device: Brand: FIELDER XT

## (undated) DEVICE — CONNECTOR DRNGE W3/8-0.5XH3/16XL3/16IN 2:1 SIL CARD STR

## (undated) DEVICE — BANDAGE COMPR SELF ADH 5 YDX2 IN TAN NS PREMIERPRO LF

## (undated) DEVICE — Device: Brand: JELCO

## (undated) DEVICE — KENDALL DL ECG CABLE AND LEAD WIRE SYSTEM, 3-LEAD, SINGLE PATIENT USE: Brand: KENDALL

## (undated) DEVICE — ZIMMER® STERILE DISPOSABLE TOURNIQUET CUFF WITH PLC, DUAL PORT, SINGLE BLADDER, 18 IN. (46 CM)

## (undated) DEVICE — TR BAND RADIAL ARTERY COMPRESSION DEVICE: Brand: TR BAND

## (undated) DEVICE — BIPOLAR FORCEPS CORD: Brand: VALLEYLAB

## (undated) DEVICE — SEALANT FIBRIN 4 CC FRZN PRE FILLED SYR TISSEEL

## (undated) DEVICE — 3M™ MEDIPORE™ H SOFT CLOTH SURGICAL TAPE 2864, 4 INCH X 10 YARD (10CM X 9,14M), 12 ROLLS/CASE: Brand: 3M™ MEDIPORE™

## (undated) DEVICE — CAUTERY OPHTH HI TEMP 2 IN LOOPTIP FLX EXT SHFT STRL BOVIE

## (undated) DEVICE — TRAP ENDOSCP POLYP 2 CHMBR DRAWER TYP

## (undated) DEVICE — PRESSURE MONITORING SET: Brand: TRUWAVE

## (undated) DEVICE — PLEDGET SURG W3/16XL0.25IN THK1.65MM PTFE OVL FELT FOR THE

## (undated) DEVICE — CATH GUID COR EB40 6FR 100CM -- LAUNCHER

## (undated) DEVICE — TUBING, SUCTION, 1/4" X 10', STRAIGHT: Brand: MEDLINE

## (undated) DEVICE — SUTURE MCRYL SZ 3-0 L27IN ABSRB UD L19MM PS-2 3/8 CIR PRIM Y427H

## (undated) DEVICE — LIGHT HANDLE: Brand: DEVON

## (undated) DEVICE — ENDOSCOPIC KIT COMPLIANCE ENDOKIT

## (undated) DEVICE — NDL PERC VASC ACC 18GX2.75I --

## (undated) DEVICE — GUIDEWIRE VASC L260CM 0.035IN J TIP L3MM PTFE FIX COR NAMIC

## (undated) DEVICE — CATHETER ANGIO JR4 AD 5 FRX100 CM 25 CM PERFORMA

## (undated) DEVICE — 3M™ TEGADERM™ TRANSPARENT FILM DRESSING FRAME STYLE, 1624W, 2-3/8 IN X 2-3/4 IN (6 CM X 7 CM), 100/CT 4CT/CASE: Brand: 3M™ TEGADERM™

## (undated) DEVICE — MINI TREK CORONARY DILATATION CATHETER 2.0 MM X 30 MM / RAPID-EXCHANGE: Brand: MINI TREK

## (undated) DEVICE — BITEBLOCK 54FR W/ DENT RIM BLOX

## (undated) DEVICE — 6 FOOT DISPOSABLE EXTENSION CABLE WITH SAFE CONNECT / SCREW-DOWN

## (undated) DEVICE — CATHETER BLLN  SPRINTER 138CM 12MM DIA1.5MM CROSSING PROF

## (undated) DEVICE — AIRLIFE™  ADULT CUSHION NASAL CANNULA WITH 7 FOOT (2.1 M) CRUSH-RESISTANT OXYGEN TUBING, AND U/CONNECT-IT ADAPTER: Brand: AIRLIFE™

## (undated) DEVICE — SPLINT WR POS F/ARTERIAL ACC -- BX/10

## (undated) DEVICE — BLANKET WRM W25XL64IN NONWOVEN SFT LTWT PLIABLE HYPR

## (undated) DEVICE — CATH GUID COR JR4.0 6FR 100CM -- LAUNCHER

## (undated) DEVICE — 3M™ TEGADERM™ TRANSPARENT FILM DRESSING FRAME STYLE, 1626W, 4 IN X 4-3/4 IN (10 CM X 12 CM), 50/CT 4CT/CASE: Brand: 3M™ TEGADERM™

## (undated) DEVICE — SYRINGE MED 50ML LUERLOCK TIP

## (undated) DEVICE — ADULT SPO2 SENSOR: Brand: NELLCOR

## (undated) DEVICE — GLIDESHEATH SLENDER ACCESS KIT: Brand: GLIDESHEATH SLENDER

## (undated) DEVICE — GLOVE SURG SZ 8 L12IN FNGR THK79MIL GRN LTX FREE

## (undated) DEVICE — GUIDEWIRE VASC L182CM DIA0.014IN HYDRPHLC COR EXTRA SUPP

## (undated) DEVICE — SYR ART 700 CLEAR MARK 7 -- ARTERION

## (undated) DEVICE — CATH BLLN DIL 2.5 X12MM RX -- EUPHORA

## (undated) DEVICE — GUIDEWIRE VASC L145CM 0.035IN J TIP L3MM PTFE FIX COR NAMIC

## (undated) DEVICE — DRAPE SLUSH DISC W44XL66IN ST FOR RND BSIN HUSH SLUSH SYS

## (undated) DEVICE — SOLUTION IRRIG 1000ML STRL H2O USP PLAS POUR BTL

## (undated) DEVICE — MAX-CORE® DISPOSABLE CORE BIOPSY INSTRUMENT, 16G X 16CM: Brand: MAX-CORE

## (undated) DEVICE — COVIDIEN KENDALL DL DISPOSABLE 3 LEAD SY: Brand: MEDLINE RENEWAL

## (undated) DEVICE — TOURNIQUET 12FR L7IN 3 CLR 1 SNR VENA CAVA DLP

## (undated) DEVICE — SUT PROL 4-0 36IN SH DA BLU --

## (undated) DEVICE — HAND I-LF: Brand: MEDLINE INDUSTRIES, INC.

## (undated) DEVICE — CATH GUID COR JR3.5 6FR 100CM -- LAUNCHER

## (undated) DEVICE — SOLUTION IRRIG 1000ML 0.9% SOD CHL USP POUR PLAS BTL

## (undated) DEVICE — RADIFOCUS GLIDECATH: Brand: GLIDECATH

## (undated) DEVICE — SUTURE ETHBND 2-0 30IN NONABSORB GRN WHT V-5 17MM 1/2 PXX52N

## (undated) DEVICE — KIT ANGIOGRAPHY CUST MRMC

## (undated) DEVICE — TREK CORONARY DILATATION CATHETER 2.50 MM X 15 MM / RAPID-EXCHANGE: Brand: TREK

## (undated) DEVICE — CATH GUID COR AL75 6FR 100CM -- LAUNCHER

## (undated) DEVICE — SOLUTION IV 1000ML 0.9% SOD CHL

## (undated) DEVICE — SUTURE ETHLN SZ 4-0 L18IN NONABSORBABLE BLK L19MM PS-2 3/8 1667H

## (undated) DEVICE — FORCEPS BX L240CM JAW DIA2.4MM WRK CHN 2.8MM ORNG L CAP W/

## (undated) DEVICE — CUFF BLD PRSS AD CLTH SGL TB W/ BAYNT CONN ROUNDED CORNER

## (undated) DEVICE — IV START KIT: Brand: MEDLINE

## (undated) DEVICE — FOGARTY - HYDRAGRIP SURGICAL - CLAMP INSERTS: Brand: FOGARTY HYDRAJAW

## (undated) DEVICE — DRESSING FOAM W8.7XL9.1IN SAFETAC LAYR SELF ADH MEPILEX

## (undated) DEVICE — BAND COMPR L24CM REG CLR PLAS HEMSTAT EXT HK AND LOOP RETEN

## (undated) DEVICE — SUTURE SZ 7 L18IN NONABSORBABLE SIL CCS L48MM 1/2 CIR STRNM M655G

## (undated) DEVICE — HI-TORQUE VERSACORE FLOPPY GUIDE WIRE SYSTEM 145 CM: Brand: HI-TORQUE VERSACORE

## (undated) DEVICE — SUTURE NONABSORBABLE MONOFILAMENT 4-0 RB-1 36 IN BLU PROLENE 8557H

## (undated) DEVICE — SUTURE VCRL RAPIDE SZ 3-0 L18IN ABSRB UD PS-2 L19MM 3/8 CIR VR497

## (undated) DEVICE — CATH GUID COR AL10 6FR 100CM -- LAUNCHER

## (undated) DEVICE — SUTURE ETHBND EXCEL SZ 3-0 L36IN NONABSORBABLE GRN BB L17MM X588H

## (undated) DEVICE — SYRINGE ANGIO 10 CC BRL STD PRNT POLYCARB LT BLU MEDALLION

## (undated) DEVICE — CATHETER ANGIO AL1 038 5 FRX100 CM 5 CM PERFORMA

## (undated) DEVICE — PACK PROCEDURE SURG HRT CATH

## (undated) DEVICE — GLOVE ORANGE PI 8   MSG9080

## (undated) DEVICE — GLOVE SURG SZ 7 CRM LTX FREE POLYISOPRENE POLYMER BEAD ANTI

## (undated) DEVICE — PINNACLE INTRODUCER SHEATH: Brand: PINNACLE

## (undated) DEVICE — Z DISCONTINUED NO SUB IDED SET EXTN W/ 4 W STPCOCK M SPIN LOK 36IN

## (undated) DEVICE — CATHETER ETER ANGIO L110CM OD5FR ID046IN L75CM 038IN 145DEG CARD

## (undated) DEVICE — YANKAUER,BULB TIP,W/O VENT,RIGID,STERILE: Brand: MEDLINE

## (undated) DEVICE — TRAY BX SFT TISS W/ RUL ALC PREP PD FEN DRP TWL LUERLOCK

## (undated) DEVICE — SLING ARM LIFETEC ORTH UNIV --

## (undated) DEVICE — STERILE POLYISOPRENE POWDER-FREE SURGICAL GLOVES: Brand: PROTEXIS

## (undated) DEVICE — CATH 5F 110CM PG145 -- DXTERITY

## (undated) DEVICE — SYRINGE 20ML LL S/C 50

## (undated) DEVICE — COVER,TABLE,HEAVY DUTY,77"X90",STRL: Brand: MEDLINE

## (undated) DEVICE — ANGIO-SEAL VIP VASCULAR CLOSURE DEVICE: Brand: ANGIO-SEAL

## (undated) DEVICE — SYR 10ML LUER LOK 1/5ML GRAD --

## (undated) DEVICE — CANNULA AORT ROOT INTRO STD TIP 5FR OVERALL LEN 55IN DLP

## (undated) DEVICE — TRANSFER PK BLD PROD 300ML --

## (undated) DEVICE — BANDAGE,GAUZE,CONFORMING,3"X75",STRL,LF: Brand: MEDLINE

## (undated) DEVICE — ROSEN CURVED WIRE GUIDE: Brand: ROSEN

## (undated) DEVICE — GUIDE CATHETER: Brand: RUNWAY™

## (undated) DEVICE — BLADE KNF CRPL TUNN MINI DISP --

## (undated) DEVICE — CATHETER ANGIO AR1 0.054 INX6 FRX100 CM 3 CM PERFORMA

## (undated) DEVICE — (D)PREP SKN CHLRAPRP APPL 26ML -- CONVERT TO ITEM 371833

## (undated) DEVICE — CATHETER ANGIO JL3.5 AD 5 FRX100 CM PERFORMA

## (undated) DEVICE — SUTURE PERMA-HAND SZ 2 L60IN NONABSORBABLE BLK SILK BRAID SA8H

## (undated) DEVICE — SET GRAV CK VLV NEEDLESS ST 3 GANGED 4WAY STPCOCK HI FLO 10

## (undated) DEVICE — TUBING PRSS MON L6IN PVC M FEM CONN

## (undated) DEVICE — VESSEL LOOPS,MAXI, BLUE: Brand: DEVON

## (undated) DEVICE — OPEN HEART B-RICHMOND: Brand: MEDLINE INDUSTRIES, INC.

## (undated) DEVICE — PROVE COVER: Brand: UNBRANDED

## (undated) DEVICE — GUIDEWIRE WITH ICE™ HYDROPHILIC COATING: Brand: CHOICE™ PT

## (undated) DEVICE — DUAL LUMEN STOMACH TUBE MULTI-FUNCTIONAL PORT: Brand: SALEM SUMP

## (undated) DEVICE — Device: Brand: PROWATER

## (undated) DEVICE — CUSTOM KT PTCA INFL DEV K05 00053H

## (undated) DEVICE — RADIFOCUS GLIDEWIRE ADVANTAGE GUIDEWIRE: Brand: GLIDEWIRE ADVANTAGE

## (undated) DEVICE — SOL INJ SOD CL 0.9% 500ML BG --

## (undated) DEVICE — SYR 3ML LL TIP 1/10ML GRAD --

## (undated) DEVICE — DECANTER FLD L85IN IV FLX TBNG CLMP DLP

## (undated) DEVICE — INSERT SUT HLD F/OCTBS RETRCTR --

## (undated) DEVICE — CATHETER IV 22GA L1IN OD0.8382-0.9144MM ID0.6096-0.6858MM 382523

## (undated) DEVICE — STRETCH BANDAGE ROLL: Brand: DERMACEA

## (undated) DEVICE — OCCLUSIVE GAUZE STRIP,3% BISMUTH TRIBROMOPHENATE IN PETROLATUM BLEND: Brand: XEROFORM

## (undated) DEVICE — VALVE INSRT TOOL ADPT MTL 9FR -- ACCESS

## (undated) DEVICE — NC TREK CORONARY DILATATION CATHETER 3.0 MM X 20 MM / RAPID-EXCHANGE: Brand: NC TREK

## (undated) DEVICE — SUTURE VCRL SZ 0 L18IN ABSRB VLT L36MM CT-1 1/2 CIR J740D

## (undated) DEVICE — CATH GUID .056IN 6FR 150CM -- GUIDELINER V3

## (undated) DEVICE — KERLIX BANDAGE ROLL: Brand: KERLIX

## (undated) DEVICE — CATH GUID COR EB35 6FR 100CM -- LAUNCHER

## (undated) DEVICE — CANNULA PERF 15FR L12.5IN RG STPCOCK WIREWOUND BODY

## (undated) DEVICE — Device

## (undated) DEVICE — GOWN,SIRUS,NONRNF,SETINSLV,XL,20/CS: Brand: MEDLINE

## (undated) DEVICE — NC TREK CORONARY DILATATION CATHETER 3.0 MM X 15 MM / RAPID-EXCHANGE: Brand: NC TREK

## (undated) DEVICE — TIP SUCT TRNSPAR RIB SURF STD BLB RIG NVENT W/ 5IN1 CONN DYND50138] MEDLINE INDUSTRIES INC]

## (undated) DEVICE — ANGIOGRAPHY KIT CUST [K0910930B] [MERIT MEDICAL SYSTEMS INC]

## (undated) DEVICE — CATHETER ETER DIAG L100CM OD6FR VASC JUDKINS L COR W O HYDRPHLC COAT

## (undated) DEVICE — INFECTION CONTROL KIT SYS

## (undated) DEVICE — CANISTER, RIGID, 3000CC: Brand: MEDLINE INDUSTRIES, INC.

## (undated) DEVICE — DRESSING HEMOSTATIC SFT INTVENT W/O SLT DBL WRP QUIKCLOT LF

## (undated) DEVICE — SOLUTION LACTATED RINGERS INJECTION USP

## (undated) DEVICE — SYSTEM SKIN CLOSURE 42CM DERMABOND PRINEO

## (undated) DEVICE — HI-TORQUE PILOT 200 GUIDE WIRE .014 STRAIGHT TIP 3.0 CM X 300 CM: Brand: HI-TORQUE PILOT

## (undated) DEVICE — SYR LR LCK 1ML GRAD NSAF 30ML --

## (undated) DEVICE — SUTURE PROL SZ 5-0 L30IN NONABSORBABLE BLU L13MM RB-2 1/2 8710H

## (undated) DEVICE — MEDI-TRACE CADENCE ADULT, DEFIBRILLATION ELECTRODE -RTS  (10 PR/PK) - PHILIPS: Brand: MEDI-TRACE CADENCE

## (undated) DEVICE — SNARE ENDOSCP POLYP MED 2.4 MM 240 CM 27 MM 2.8 MM SHT SENS

## (undated) DEVICE — HAND-MRMCASU: Brand: MEDLINE INDUSTRIES, INC.

## (undated) DEVICE — 72" ARTERIAL PRESSURE TUBING: Brand: ICU MEDICAL

## (undated) DEVICE — SUMP PERICARD AD 20FR WT TIP VERSATILE DSGN MULT PRT VENT

## (undated) DEVICE — SUTURE TICRON DBL ARMED 2 0 CV 305 42IN BLU N ABSRB BRAID 8886303551

## (undated) DEVICE — LOOP,VESSEL,MAXI,BLUE,2/PK,STERILE: Brand: MEDLINE

## (undated) DEVICE — GLUE TISS 10ML PLAS STD SPRD TIP SYR PLUNG PREFIL SKIN CLSR 5PK